# Patient Record
Sex: MALE | Race: WHITE | NOT HISPANIC OR LATINO | Employment: OTHER
[De-identification: names, ages, dates, MRNs, and addresses within clinical notes are randomized per-mention and may not be internally consistent; named-entity substitution may affect disease eponyms.]

---

## 2018-07-20 ENCOUNTER — HOSPITAL ENCOUNTER (OUTPATIENT)
Dept: HOME HEALTH SERVICES | Facility: HOME HEALTHCARE | Age: 68
Setting detail: SPECIMEN
Discharge: HOME | End: 2018-07-20
Attending: INTERNAL MEDICINE | Admitting: INTERNAL MEDICINE

## 2018-07-20 LAB — VANCOMYCIN TROUGH SERPL-MCNC: 18 UG/ML (ref 10–20)

## 2018-07-24 ENCOUNTER — HOSPITAL ENCOUNTER (OUTPATIENT)
Dept: HOME HEALTH SERVICES | Facility: HOME HEALTHCARE | Age: 68
Setting detail: SPECIMEN
Discharge: HOME | End: 2018-07-24
Attending: INTERNAL MEDICINE | Admitting: INTERNAL MEDICINE

## 2018-07-24 LAB
ANION GAP SERPL CALC-SCNC: 11.2 MMOL/L (ref 10–20)
BASOPHILS # BLD AUTO: 0.1 10*3/UL (ref 0–0.2)
BASOPHILS NFR BLD AUTO: 1 % (ref 0–2)
BUN SERPL-MCNC: 31 MG/DL (ref 8–20)
BUN/CREAT SERPL: 23.8 (ref 6.2–20.3)
CALCIUM SERPL-MCNC: 8.8 MG/DL (ref 8.9–10.3)
CHLORIDE SERPL-SCNC: 104 MMOL/L (ref 101–111)
CONV CO2: 26 MMOL/L (ref 22–32)
CREAT UR-MCNC: 1.3 MG/DL (ref 0.7–1.2)
DIFFERENTIAL METHOD BLD: (no result)
EOSINOPHIL # BLD AUTO: 0.9 10*3/UL (ref 0–0.3)
EOSINOPHIL # BLD AUTO: 11 % (ref 0–3)
ERYTHROCYTE [DISTWIDTH] IN BLOOD BY AUTOMATED COUNT: 15.2 % (ref 11.5–14.5)
ERYTHROCYTE [SEDIMENTATION RATE] IN BLOOD BY WESTERGREN METHOD: 110 MM/HR (ref 0–20)
GLUCOSE SERPL-MCNC: 307 MG/DL (ref 65–99)
HCT VFR BLD AUTO: 29.5 % (ref 40–54)
HGB BLD-MCNC: 9.8 G/DL (ref 14–18)
LYMPHOCYTES # BLD AUTO: 1.8 10*3/UL (ref 0.8–4.8)
LYMPHOCYTES NFR BLD AUTO: 21 % (ref 18–42)
MCH RBC QN AUTO: 29.5 PG (ref 26–32)
MCHC RBC AUTO-ENTMCNC: 33.1 G/DL (ref 32–36)
MCV RBC AUTO: 88.8 FL (ref 80–94)
MONOCYTES # BLD AUTO: 0.7 10*3/UL (ref 0.1–1.3)
MONOCYTES NFR BLD AUTO: 8 % (ref 2–11)
NEUTROPHILS # BLD AUTO: 5 10*3/UL (ref 2.3–8.6)
NEUTROPHILS NFR BLD AUTO: 59 % (ref 50–75)
NRBC BLD AUTO-RTO: 0 /100{WBCS}
NRBC/RBC NFR BLD MANUAL: 0 10*3/UL
PLATELET # BLD AUTO: 385 10*3/UL (ref 150–450)
PMV BLD AUTO: 8.3 FL (ref 7.4–10.4)
POTASSIUM SERPL-SCNC: 4.2 MMOL/L (ref 3.6–5.1)
RBC # BLD AUTO: 3.32 10*6/UL (ref 4.6–6)
SODIUM SERPL-SCNC: 137 MMOL/L (ref 136–144)
VANCOMYCIN TROUGH SERPL-MCNC: 21 UG/ML (ref 10–20)
WBC # BLD AUTO: 8.5 10*3/UL (ref 4.5–11.5)

## 2018-07-31 ENCOUNTER — HOSPITAL ENCOUNTER (OUTPATIENT)
Dept: HOME HEALTH SERVICES | Facility: HOME HEALTHCARE | Age: 68
Setting detail: SPECIMEN
Discharge: HOME | End: 2018-07-31
Attending: INTERNAL MEDICINE | Admitting: INTERNAL MEDICINE

## 2018-07-31 LAB
ANION GAP SERPL CALC-SCNC: 13.2 MMOL/L (ref 10–20)
BASOPHILS # BLD AUTO: 0.1 10*3/UL (ref 0–0.2)
BASOPHILS NFR BLD AUTO: 1 % (ref 0–2)
BUN SERPL-MCNC: 30 MG/DL (ref 8–20)
BUN/CREAT SERPL: 23.1 (ref 6.2–20.3)
CALCIUM SERPL-MCNC: 8.8 MG/DL (ref 8.9–10.3)
CHLORIDE SERPL-SCNC: 98 MMOL/L (ref 101–111)
CONV CO2: 32 MMOL/L (ref 22–32)
CREAT UR-MCNC: 1.3 MG/DL (ref 0.7–1.2)
CRP SERPL-MCNC: 0.96 MG/DL (ref 0–0.7)
DIFFERENTIAL METHOD BLD: (no result)
EOSINOPHIL # BLD AUTO: 1.1 10*3/UL (ref 0–0.3)
EOSINOPHIL # BLD AUTO: 12 % (ref 0–3)
ERYTHROCYTE [DISTWIDTH] IN BLOOD BY AUTOMATED COUNT: 15.3 % (ref 11.5–14.5)
ERYTHROCYTE [SEDIMENTATION RATE] IN BLOOD BY WESTERGREN METHOD: 113 MM/HR (ref 0–20)
GLUCOSE SERPL-MCNC: 108 MG/DL (ref 65–99)
HCT VFR BLD AUTO: 29.4 % (ref 40–54)
HGB BLD-MCNC: 9.9 G/DL (ref 14–18)
LYMPHOCYTES # BLD AUTO: 2.7 10*3/UL (ref 0.8–4.8)
LYMPHOCYTES NFR BLD AUTO: 28 % (ref 18–42)
MCH RBC QN AUTO: 29.7 PG (ref 26–32)
MCHC RBC AUTO-ENTMCNC: 33.6 G/DL (ref 32–36)
MCV RBC AUTO: 88.6 FL (ref 80–94)
MONOCYTES # BLD AUTO: 0.8 10*3/UL (ref 0.1–1.3)
MONOCYTES NFR BLD AUTO: 8 % (ref 2–11)
NEUTROPHILS # BLD AUTO: 4.9 10*3/UL (ref 2.3–8.6)
NEUTROPHILS NFR BLD AUTO: 51 % (ref 50–75)
NRBC BLD AUTO-RTO: 0 /100{WBCS}
NRBC/RBC NFR BLD MANUAL: 0 10*3/UL
PLATELET # BLD AUTO: 353 10*3/UL (ref 150–450)
PMV BLD AUTO: 7.9 FL (ref 7.4–10.4)
POTASSIUM SERPL-SCNC: 4.2 MMOL/L (ref 3.6–5.1)
RBC # BLD AUTO: 3.32 10*6/UL (ref 4.6–6)
SODIUM SERPL-SCNC: 139 MMOL/L (ref 136–144)
VANCOMYCIN TROUGH SERPL-MCNC: 19 UG/ML (ref 10–20)
WBC # BLD AUTO: 9.5 10*3/UL (ref 4.5–11.5)

## 2018-08-07 ENCOUNTER — HOSPITAL ENCOUNTER (OUTPATIENT)
Dept: HOME HEALTH SERVICES | Facility: HOME HEALTHCARE | Age: 68
Setting detail: SPECIMEN
Discharge: HOME | End: 2018-08-07
Attending: INTERNAL MEDICINE | Admitting: INTERNAL MEDICINE

## 2018-08-07 LAB
ANION GAP SERPL CALC-SCNC: 15 MMOL/L (ref 10–20)
BASOPHILS # BLD AUTO: 0.1 10*3/UL (ref 0–0.2)
BASOPHILS NFR BLD AUTO: 1 % (ref 0–2)
BUN SERPL-MCNC: 32 MG/DL (ref 8–20)
BUN/CREAT SERPL: 24.6 (ref 6.2–20.3)
CALCIUM SERPL-MCNC: 9.1 MG/DL (ref 8.9–10.3)
CHLORIDE SERPL-SCNC: 101 MMOL/L (ref 101–111)
CONV CO2: 28 MMOL/L (ref 22–32)
CREAT UR-MCNC: 1.3 MG/DL (ref 0.7–1.2)
CRP SERPL-MCNC: 0.88 MG/DL (ref 0–0.7)
DIFFERENTIAL METHOD BLD: (no result)
EOSINOPHIL # BLD AUTO: 1.3 10*3/UL (ref 0–0.3)
EOSINOPHIL # BLD AUTO: 14 % (ref 0–3)
ERYTHROCYTE [DISTWIDTH] IN BLOOD BY AUTOMATED COUNT: 15.4 % (ref 11.5–14.5)
ERYTHROCYTE [SEDIMENTATION RATE] IN BLOOD BY WESTERGREN METHOD: 93 MM/HR (ref 0–20)
GLUCOSE SERPL-MCNC: 208 MG/DL (ref 65–99)
HCT VFR BLD AUTO: 31 % (ref 40–54)
HGB BLD-MCNC: 10.4 G/DL (ref 14–18)
LYMPHOCYTES # BLD AUTO: 2 10*3/UL (ref 0.8–4.8)
LYMPHOCYTES NFR BLD AUTO: 22 % (ref 18–42)
MCH RBC QN AUTO: 29.8 PG (ref 26–32)
MCHC RBC AUTO-ENTMCNC: 33.5 G/DL (ref 32–36)
MCV RBC AUTO: 89.1 FL (ref 80–94)
MONOCYTES # BLD AUTO: 0.8 10*3/UL (ref 0.1–1.3)
MONOCYTES NFR BLD AUTO: 9 % (ref 2–11)
NEUTROPHILS # BLD AUTO: 4.8 10*3/UL (ref 2.3–8.6)
NEUTROPHILS NFR BLD AUTO: 54 % (ref 50–75)
NRBC BLD AUTO-RTO: 0 /100{WBCS}
NRBC/RBC NFR BLD MANUAL: 0 10*3/UL
PLATELET # BLD AUTO: 270 10*3/UL (ref 150–450)
PMV BLD AUTO: 8.3 FL (ref 7.4–10.4)
POTASSIUM SERPL-SCNC: 4 MMOL/L (ref 3.6–5.1)
RBC # BLD AUTO: 3.48 10*6/UL (ref 4.6–6)
SODIUM SERPL-SCNC: 140 MMOL/L (ref 136–144)
VANCOMYCIN TROUGH SERPL-MCNC: 18 UG/ML (ref 10–20)
WBC # BLD AUTO: 9.1 10*3/UL (ref 4.5–11.5)

## 2018-10-22 ENCOUNTER — HOSPITAL ENCOUNTER (OUTPATIENT)
Dept: ORTHOPEDIC SURGERY | Facility: CLINIC | Age: 68
Discharge: HOME | End: 2018-10-22
Attending: PODIATRIST | Admitting: PODIATRIST

## 2018-11-08 ENCOUNTER — HOSPITAL ENCOUNTER (OUTPATIENT)
Dept: ORTHOPEDIC SURGERY | Facility: CLINIC | Age: 68
Discharge: HOME | End: 2018-11-08
Attending: PODIATRIST | Admitting: PODIATRIST

## 2018-11-12 ENCOUNTER — HOSPITAL ENCOUNTER (OUTPATIENT)
Dept: PREADMISSION TESTING | Facility: HOSPITAL | Age: 68
Discharge: HOME | End: 2018-11-12
Attending: PODIATRIST | Admitting: PODIATRIST

## 2018-11-12 LAB
ANION GAP SERPL CALC-SCNC: 15.3 MMOL/L (ref 10–20)
BACTERIA SPEC AEROBE CULT: NORMAL
BASOPHILS # BLD AUTO: 0.1 10*3/UL (ref 0–0.2)
BASOPHILS NFR BLD AUTO: 1 % (ref 0–2)
BUN SERPL-MCNC: 56 MG/DL (ref 8–20)
BUN/CREAT SERPL: 26.7 (ref 6.2–20.3)
CALCIUM SERPL-MCNC: 8.6 MG/DL (ref 8.9–10.3)
CHLORIDE SERPL-SCNC: 101 MMOL/L (ref 101–111)
CONV CO2: 29 MMOL/L (ref 22–32)
CREAT UR-MCNC: 2.1 MG/DL (ref 0.7–1.2)
DIFFERENTIAL METHOD BLD: (no result)
EOSINOPHIL # BLD AUTO: 0.3 10*3/UL (ref 0–0.3)
EOSINOPHIL # BLD AUTO: 4 % (ref 0–3)
ERYTHROCYTE [DISTWIDTH] IN BLOOD BY AUTOMATED COUNT: 15.2 % (ref 11.5–14.5)
GLUCOSE SERPL-MCNC: 278 MG/DL (ref 65–99)
HCT VFR BLD AUTO: 31.9 % (ref 40–54)
HGB BLD-MCNC: 11.1 G/DL (ref 14–18)
LYMPHOCYTES # BLD AUTO: 2.7 10*3/UL (ref 0.8–4.8)
LYMPHOCYTES NFR BLD AUTO: 28 % (ref 18–42)
Lab: NORMAL
MCH RBC QN AUTO: 31.6 PG (ref 26–32)
MCHC RBC AUTO-ENTMCNC: 34.6 G/DL (ref 32–36)
MCV RBC AUTO: 91.1 FL (ref 80–94)
MICRO REPORT STATUS: NORMAL
MONOCYTES # BLD AUTO: 1 10*3/UL (ref 0.1–1.3)
MONOCYTES NFR BLD AUTO: 10 % (ref 2–11)
NEUTROPHILS # BLD AUTO: 5.7 10*3/UL (ref 2.3–8.6)
NEUTROPHILS NFR BLD AUTO: 57 % (ref 50–75)
NRBC BLD AUTO-RTO: 0 /100{WBCS}
NRBC/RBC NFR BLD MANUAL: 0 10*3/UL
PLATELET # BLD AUTO: 280 10*3/UL (ref 150–450)
PMV BLD AUTO: 8.3 FL (ref 7.4–10.4)
POTASSIUM SERPL-SCNC: 4.3 MMOL/L (ref 3.6–5.1)
RBC # BLD AUTO: 3.5 10*6/UL (ref 4.6–6)
SODIUM SERPL-SCNC: 141 MMOL/L (ref 136–144)
SPECIMEN SOURCE: NORMAL
WBC # BLD AUTO: 9.7 10*3/UL (ref 4.5–11.5)

## 2018-11-16 ENCOUNTER — HOSPITAL ENCOUNTER (OUTPATIENT)
Dept: PREOP | Facility: HOSPITAL | Age: 68
Setting detail: HOSPITAL OUTPATIENT SURGERY
Discharge: HOME | End: 2018-11-16
Attending: PODIATRIST | Admitting: PODIATRIST

## 2018-11-16 LAB
ANION GAP SERPL CALC-SCNC: 16.2 MMOL/L (ref 10–20)
BACTERIA SPEC AEROBE CULT: NORMAL
BUN SERPL-MCNC: 32 MG/DL (ref 8–20)
BUN/CREAT SERPL: 22.9 (ref 6.2–20.3)
CALCIUM SERPL-MCNC: 9.2 MG/DL (ref 8.9–10.3)
CHLORIDE SERPL-SCNC: 101 MMOL/L (ref 101–111)
CONV CO2: 24 MMOL/L (ref 22–32)
CREAT UR-MCNC: 1.4 MG/DL (ref 0.7–1.2)
GLUCOSE BLD-MCNC: 171 MG/DL (ref 70–105)
GLUCOSE BLD-MCNC: 224 MG/DL (ref 70–105)
GLUCOSE BLD-MCNC: 243 MG/DL (ref 70–105)
GLUCOSE BLD-MCNC: 259 MG/DL (ref 70–105)
GLUCOSE SERPL-MCNC: 275 MG/DL (ref 65–99)
GRAM STN SPEC: NORMAL
Lab: NORMAL
MICRO REPORT STATUS: NORMAL
POTASSIUM SERPL-SCNC: 4.2 MMOL/L (ref 3.6–5.1)
SODIUM SERPL-SCNC: 137 MMOL/L (ref 136–144)
SPECIMEN SOURCE: NORMAL

## 2019-01-29 ENCOUNTER — HOSPITAL ENCOUNTER (OUTPATIENT)
Dept: OTHER | Facility: HOSPITAL | Age: 69
Discharge: HOME | End: 2019-01-29
Attending: PODIATRIST | Admitting: PODIATRIST

## 2019-01-29 LAB
ANION GAP SERPL CALC-SCNC: 15.4 MMOL/L (ref 10–20)
BASOPHILS # BLD AUTO: 0.1 10*3/UL (ref 0–0.2)
BASOPHILS NFR BLD AUTO: 1 % (ref 0–2)
BUN SERPL-MCNC: 27 MG/DL (ref 8–20)
BUN/CREAT SERPL: 19.3 (ref 6.2–20.3)
CALCIUM SERPL-MCNC: 9.1 MG/DL (ref 8.9–10.3)
CHLORIDE SERPL-SCNC: 103 MMOL/L (ref 101–111)
CONV CO2: 24 MMOL/L (ref 22–32)
CREAT UR-MCNC: 1.4 MG/DL (ref 0.7–1.2)
DIFFERENTIAL METHOD BLD: (no result)
EOSINOPHIL # BLD AUTO: 0.4 10*3/UL (ref 0–0.3)
EOSINOPHIL # BLD AUTO: 4 % (ref 0–3)
ERYTHROCYTE [DISTWIDTH] IN BLOOD BY AUTOMATED COUNT: 15.5 % (ref 11.5–14.5)
GLUCOSE SERPL-MCNC: 227 MG/DL (ref 65–99)
HCT VFR BLD AUTO: 34 % (ref 40–54)
HGB BLD-MCNC: 11 G/DL (ref 14–18)
LYMPHOCYTES # BLD AUTO: 2.2 10*3/UL (ref 0.8–4.8)
LYMPHOCYTES NFR BLD AUTO: 21 % (ref 18–42)
MCH RBC QN AUTO: 30.2 PG (ref 26–32)
MCHC RBC AUTO-ENTMCNC: 32.2 G/DL (ref 32–36)
MCV RBC AUTO: 93.6 FL (ref 80–94)
MONOCYTES # BLD AUTO: 0.8 10*3/UL (ref 0.1–1.3)
MONOCYTES NFR BLD AUTO: 8 % (ref 2–11)
NEUTROPHILS # BLD AUTO: 7 10*3/UL (ref 2.3–8.6)
NEUTROPHILS NFR BLD AUTO: 66 % (ref 50–75)
NRBC BLD AUTO-RTO: 0 /100{WBCS}
NRBC/RBC NFR BLD MANUAL: 0 10*3/UL
PLATELET # BLD AUTO: 319 10*3/UL (ref 150–450)
PMV BLD AUTO: 8.2 FL (ref 7.4–10.4)
POTASSIUM SERPL-SCNC: 4.4 MMOL/L (ref 3.6–5.1)
RBC # BLD AUTO: 3.63 10*6/UL (ref 4.6–6)
SODIUM SERPL-SCNC: 138 MMOL/L (ref 136–144)
WBC # BLD AUTO: 10.6 10*3/UL (ref 4.5–11.5)

## 2019-02-04 ENCOUNTER — HOSPITAL ENCOUNTER (OUTPATIENT)
Dept: PREOP | Facility: HOSPITAL | Age: 69
Setting detail: HOSPITAL OUTPATIENT SURGERY
Discharge: HOME | End: 2019-02-04
Attending: PODIATRIST | Admitting: PODIATRIST

## 2019-02-04 LAB
GLUCOSE BLD-MCNC: 150 MG/DL (ref 70–105)
GLUCOSE BLD-MCNC: 179 MG/DL (ref 70–105)
GLUCOSE BLD-MCNC: 187 MG/DL (ref 70–105)

## 2019-05-22 ENCOUNTER — HOSPITAL ENCOUNTER (OUTPATIENT)
Dept: RESPIRATORY THERAPY | Facility: HOSPITAL | Age: 69
Discharge: HOME | End: 2019-05-22
Attending: INTERNAL MEDICINE | Admitting: INTERNAL MEDICINE

## 2019-06-03 ENCOUNTER — TRANSCRIBE ORDERS (OUTPATIENT)
Dept: SLEEP MEDICINE | Facility: HOSPITAL | Age: 69
End: 2019-06-03

## 2019-06-03 DIAGNOSIS — G47.33 OBSTRUCTIVE SLEEP APNEA (ADULT) (PEDIATRIC): Primary | ICD-10-CM

## 2019-06-05 ENCOUNTER — CONVERSION ENCOUNTER (OUTPATIENT)
Dept: ORTHOPEDIC SURGERY | Facility: CLINIC | Age: 69
End: 2019-06-05

## 2019-06-05 VITALS
BODY MASS INDEX: 40.8 KG/M2 | DIASTOLIC BLOOD PRESSURE: 63 MMHG | SYSTOLIC BLOOD PRESSURE: 114 MMHG | WEIGHT: 285 LBS | HEART RATE: 69 BPM | HEIGHT: 70 IN

## 2019-06-06 NOTE — PROGRESS NOTES
Visit Type:  Acute Visit  Primary Provider:  Delmar    CC:  Left foot ulcer F/U.    History of Present Illness:  Patient returns for follow-up on left foot ulceration.  He states that he is doing well.  He has been doing dressings as directed.  Denies any other issues.      Past Medical History:     Reviewed history from 11/05/2015 and no changes required:        Diabetes Mellitus        Hyperlipidemia        Obesity        Hypogonadism male        Angina        Arthritis        Unsteady gait        Diabetic Neuropathy        Loss of appetite        Sleep Apnea        Carpal Tunnel on left hand    Past Surgical History:     Reviewed history from 02/20/2019 and no changes required:        CABG 2005 @ [facility]        Angioplasty x2        PTCA        Colon Resection 2005        Appendectomy        Cholecystectomy        Bilateral Cataract Removal 2002- by Dr. Lux Acosta        Right toe removed D/T infected cut that went to the bone.        Heart Catherization (11/13/2015)        P T C A: (11/13/2015) stent to proximal in stent and mid to distal lad         cyst removed from scrotum         P T C A: (09/16/2016)stent to mid lad and stent to vein graft to the marginal         Carpal Tunnel - Lt hand // other hand surgeries (4/29/2018)        Right foot surgery 7/17/18        Left foot 2/4/19    Active Medications (reviewed today):  ATORVASTATIN CALCIUM 40 MG ORAL TABLET (ATORVASTATIN CALCIUM) Take 1 tablet by mouth daily  ASPIRIN 325 MG ORAL TABLET (ASPIRIN) Take 1 tablet by mouth daily  DIGITEK 250 MCG ORAL TABLET (DIGOXIN) Take 1 tablet by mouth daily  PLAVIX 75 MG ORAL TABLET (CLOPIDOGREL BISULFATE) Take 1 tablet by mouth daily  BUMETANIDE 2 MG ORAL TABLET (BUMETANIDE) Take 1 tablet by mouth daily  GABAPENTIN 300 MG ORAL CAPSULE (GABAPENTIN) Take two (1) tablet by mouth three times a day  ENALAPRIL MALEATE 2.5 MG ORAL TABLET (ENALAPRIL MALEATE) Take one (1) tablet by mouth twice a day  ATENOLOL 100 MG ORAL TABLET  (ATENOLOL) Take 1 tablet by mouth daily  ISOSORBIDE MONONITRATE ER 60 MG ORAL TABLET EXTENDED RELEASE 24 HOUR (ISOSORBIDE MONONITRATE) Take one (1) tablet by mouth twice a day  METFORMIN HCL  MG ORAL TABLET EXTENDED RELEASE 24 HOUR (METFORMIN HCL) Take two (2) tablets by mouth twice a day  BENADRYL 25 MG ORAL TABLET (DIPHENHYDRAMINE HCL) PRN  BASE A POLYETHYLENE GLYCOL POWDER (POLYETHYLENE GLYCOL 1450)   NORCO 5-325 MG ORAL TABLET (HYDROCODONE-ACETAMINOPHEN) PRN for pain  NITROSTAT 0.4 MG SUBLINGUAL TABLET SUBLINGUAL (NITROGLYCERIN) as needed  NOVOLIN 70/30 (70-30) 100 UNIT/ML SUBCUTANEOUS SUSPENSION (INSULIN NPH ISOPHANE & REGULAR) 45 Units twice daily    Current Allergies (reviewed today):  CODEINE SULFATE (Critical)    Current Medications (including medications started today):   ALBUTEROL SULFATE  (90 BASE) MCG/ACT INHALATION AEROSOL SOLUTION (ALBUTEROL SULFATE) as directed  ATORVASTATIN CALCIUM 40 MG ORAL TABLET (ATORVASTATIN CALCIUM) Take 1 tablet by mouth daily  ASPIRIN 325 MG ORAL TABLET (ASPIRIN) Take 1 tablet by mouth daily  DIGITEK 250 MCG ORAL TABLET (DIGOXIN) Take 1 tablet by mouth daily  PLAVIX 75 MG ORAL TABLET (CLOPIDOGREL BISULFATE) Take 1 tablet by mouth daily  BUMETANIDE 2 MG ORAL TABLET (BUMETANIDE) Take 1 tablet by mouth daily  GABAPENTIN 300 MG ORAL CAPSULE (GABAPENTIN) Take two (1) tablet by mouth three times a day  ENALAPRIL MALEATE 2.5 MG ORAL TABLET (ENALAPRIL MALEATE) Take one (1) tablet by mouth twice a day  ATENOLOL 100 MG ORAL TABLET (ATENOLOL) Take 1 tablet by mouth daily  ISOSORBIDE MONONITRATE ER 60 MG ORAL TABLET EXTENDED RELEASE 24 HOUR (ISOSORBIDE MONONITRATE) Take one (1) tablet by mouth twice a day  METFORMIN HCL  MG ORAL TABLET EXTENDED RELEASE 24 HOUR (METFORMIN HCL) Take two (2) tablets by mouth twice a day  BENADRYL 25 MG ORAL TABLET (DIPHENHYDRAMINE HCL) PRN  BASE A POLYETHYLENE GLYCOL POWDER (POLYETHYLENE GLYCOL 1450)   NORCO 5-325 MG ORAL TABLET  (HYDROCODONE-ACETAMINOPHEN) PRN for pain  NITROSTAT 0.4 MG SUBLINGUAL TABLET SUBLINGUAL (NITROGLYCERIN) as needed  NOVOLIN 70/30 (70-30) 100 UNIT/ML SUBCUTANEOUS SUSPENSION (INSULIN NPH ISOPHANE & REGULAR) 45 Units twice daily          Vital Signs:    Patient Profile:    68 Years Old Male  Height:     70 inches  Weight:     285 pounds  BMI:        40.89     Pulse rate: 69 / minute  BP Sittin / 63  (left arm)    Cuff size:  large      Problems: Active problems were reviewed with the patient during this visit.  Medications: Medications were reviewed with the patient during this visit.  Allergies: Allergies were reviewed with the patient during this visit.        Vitals Entered By: Grace Gonzales CMA (2019 10:12 AM)    Family History Summary:      Reviewed history Last on 05/15/2019 and no changes required:2019  Mother - Has Family History of Hypertension - Entered On: 2015  Sister - Has Family History of Hypertension - Entered On: 2015  Brother - Has Family History of Heart Disease - Entered On: 2015  Sister - Has Family History of Heart Disease - Entered On: 2015  Mother - Has Family History of Heart Disease - Entered On: 2015  Father - Has Family History of Heart Disease - Entered On: 2015  Sister - Has Family History of Diabetes - Entered On: 2015  Brother - Has Family History of Diabetes - Entered On: 2015  Mother - Has Family History of Diabetes - Entered On: 2015      Social History:     Reviewed history from 2018 and no changes required:        Patient currently smokes every day.        Patient has been counseled to quit.        Alcohol Use: N        Drug Use: Y        HIV/High Risk: N        Regular Exercise: N                Risk Factors:     Smoked Tobacco Use:  Current every day smoker     Cigarettes:  Yes -- 1ppd pack(s) per day,      Year started:    Smokeless Tobacco Use:  Never     Counseled to quit/cut down:  yes  Drug use:   yes     Substance:  marijuana  HIV high-risk behavior:  no  Caffeine use:  2 drinks per day  Alcohol use:  no  Exercise:  no  Seatbelt use:  100 %  Sun Exposure:  occasionally    Family History Risk Factors:     Family History of MI in females < 65 years old:  yes     Family History of MI in males < 55 years old:  yes      Podiatry Exam       General Appearance:  well nourished; well hydrated; no acute distress  Mental Status Exam        Judgement and Insight:  Intact       Orientation:  Oriented to time, place, and person  Cardiovascular (Left)       Dorsalis Pedis Pulse (Lt):   2/4       Posterior Tibialis Pulse (Lt):   2/4       Capillary Filling Time (Lt):  1-3 Seconds       Edema (Lt):    Moderate +pitting edema  Dermatological Exam       Temperature:  warm to warm       Skin Elasticity:  decreased elasticity  Skin:   Ulceration appears to be healed at this time  With immature  epithelialization.  No signs of infection, drainage, or maceration.  Neurological Exam (Left)       Paresthesia (Lt):  negative       Patella DTRs (Lt):  symmetric       Achilles DTRs (Lt):  symmetric  Monofilament Test (Lt):   not intact       Diabetic Risk (Lt):  Loss of protective sensation with no weakness deformity callus or pre-ulcer      MusculoSkeletal Exam (Left)       Gait and Stance (Lt):   boot assisted       ROM (Lt):  no crepitus or pain       Muscle Strength (Lt):  symmetrical 5/5       Subluxed Digits (Lt):  no subluxation or laxity of joints       Dislocated Joints (Lt):  no dislocation of joints       Gastroc soleus equinus (Lt):  Inability to dorsiflex past neutral position both straight legged and bent knee       Post tibial tendon (Lt):  no soreness noted       Peroneal Tendon (Lt):  no soreness noted        Impression & Recommendations:    Problem # 1:  Chronic ulcer of left foot, fat layer exposed (ICD-707.15) (KNU30-E30.522)    Patient returns for evaluation of the left foot ulceration.  The wound appears to be  healed at this time.  I explained that the soft tissue is immature and needs to be protected.  I have asked that he continue the same off weight-bearing protocol.  He is   to remain in the postop shoe.  He may continue padding the area but does not need a formal dressing.  He is to call with any additional changes.  I will see him in 3 weeks for re-evaluation.    Medications Added to Medication List This Visit:  1)  Albuterol Sulfate Hfa 108 (90 Base) Mcg/act Inhalation Aerosol Solution (Albuterol sulfate) .... As directed                  Medication Administration    Orders Added:  1)  39768-Lag Vst-Est Level III [CPT-45359]    ]      Electronically signed by Maximino Chung on 06/05/2019 at 10:29 AM  ________________________________________________________________________       Disclaimer: Converted Note message may not contain all data elements that existed in the legacy source system. Please see [employer] for the original note details.

## 2019-06-21 ENCOUNTER — TRANSCRIBE ORDERS (OUTPATIENT)
Dept: SLEEP MEDICINE | Facility: HOSPITAL | Age: 69
End: 2019-06-21

## 2019-06-22 ENCOUNTER — HOSPITAL ENCOUNTER (OUTPATIENT)
Dept: SLEEP MEDICINE | Facility: HOSPITAL | Age: 69
Discharge: HOME | End: 2019-06-22
Admitting: INTERNAL MEDICINE

## 2019-06-22 VITALS — HEIGHT: 70 IN | BODY MASS INDEX: 41.09 KG/M2 | WEIGHT: 287 LBS

## 2019-06-22 DIAGNOSIS — G47.33 OBSTRUCTIVE SLEEP APNEA (ADULT) (PEDIATRIC): ICD-10-CM

## 2019-06-22 PROCEDURE — 95811 POLYSOM 6/>YRS CPAP 4/> PARM: CPT

## 2019-06-22 RX ORDER — ASPIRIN 81 MG/1
81 TABLET, CHEWABLE ORAL DAILY
Status: ON HOLD | COMMUNITY
End: 2022-01-01

## 2019-06-22 RX ORDER — DOXYCYCLINE HYCLATE 100 MG/1
100 CAPSULE ORAL 2 TIMES DAILY
COMMUNITY
End: 2020-08-23

## 2019-06-22 RX ORDER — ATENOLOL 100 MG/1
100 TABLET ORAL DAILY
COMMUNITY
End: 2021-08-02 | Stop reason: HOSPADM

## 2019-06-22 RX ORDER — GABAPENTIN 600 MG/1
600 TABLET ORAL 3 TIMES DAILY
COMMUNITY
End: 2020-12-01 | Stop reason: HOSPADM

## 2019-06-22 RX ORDER — BUMETANIDE 1 MG/1
1 TABLET ORAL EVERY MORNING
COMMUNITY
End: 2021-06-03 | Stop reason: HOSPADM

## 2019-06-22 RX ORDER — ATORVASTATIN CALCIUM 40 MG/1
40 TABLET, FILM COATED ORAL NIGHTLY
COMMUNITY

## 2019-06-22 RX ORDER — ENALAPRIL MALEATE 5 MG/1
5 TABLET ORAL 2 TIMES DAILY
COMMUNITY
End: 2019-06-26

## 2019-06-22 RX ORDER — HYDROCODONE BITARTRATE AND ACETAMINOPHEN 5; 325 MG/1; MG/1
1 TABLET ORAL EVERY 12 HOURS PRN
COMMUNITY
End: 2020-11-16

## 2019-06-22 RX ORDER — CLOPIDOGREL BISULFATE 75 MG/1
75 TABLET ORAL DAILY
COMMUNITY
End: 2021-05-06

## 2019-06-22 RX ORDER — ISOSORBIDE MONONITRATE 60 MG/1
60 TABLET, EXTENDED RELEASE ORAL DAILY
Status: ON HOLD | COMMUNITY
End: 2022-01-01

## 2019-06-22 RX ORDER — DIGOXIN 125 MCG
125 TABLET ORAL
COMMUNITY
End: 2021-08-02 | Stop reason: HOSPADM

## 2019-06-26 ENCOUNTER — OFFICE VISIT (OUTPATIENT)
Dept: PODIATRY | Facility: CLINIC | Age: 69
End: 2019-06-26

## 2019-06-26 VITALS
BODY MASS INDEX: 40.89 KG/M2 | DIASTOLIC BLOOD PRESSURE: 74 MMHG | HEIGHT: 70 IN | WEIGHT: 285.6 LBS | HEART RATE: 80 BPM | SYSTOLIC BLOOD PRESSURE: 160 MMHG

## 2019-06-26 DIAGNOSIS — L97.522 CHRONIC ULCER OF LEFT FOOT WITH FAT LAYER EXPOSED (HCC): Primary | ICD-10-CM

## 2019-06-26 DIAGNOSIS — E11.42 DM TYPE 2 WITH DIABETIC PERIPHERAL NEUROPATHY (HCC): ICD-10-CM

## 2019-06-26 PROCEDURE — 99213 OFFICE O/P EST LOW 20 MIN: CPT | Performed by: PODIATRIST

## 2019-06-26 RX ORDER — ENALAPRIL MALEATE 5 MG/1
5 TABLET ORAL DAILY
COMMUNITY
Start: 2019-06-04 | End: 2021-05-06

## 2019-06-26 NOTE — PROGRESS NOTES
06/26/2019  Foot and Ankle Surgery - Established Patient/Follow-up  Provider: Dr. Maximino Chung DPM  Location: [facility]    Subjective:  Benson Mclean is a 68 y.o. male.     Chief Complaint   Patient presents with   • Left Foot - Wound Check, Follow-up       HPI: Patient returns for follow-up on his left foot wound.  He states that he is doing quite well.  The wound is well-healed at this time.  He has transition to a regular shoe.  He states that he is awaiting fitting for his diabetic shoes and inserts.  No other issues today.    Allergies   Allergen Reactions   • Codeine Itching       Current Outpatient Medications on File Prior to Visit   Medication Sig Dispense Refill   • aspirin 325 MG tablet Take 325 mg by mouth Daily.     • atenolol (TENORMIN) 100 MG tablet Take 100 mg by mouth Daily.     • atorvastatin (LIPITOR) 40 MG tablet Take 40 mg by mouth Daily.     • bumetanide (BUMEX) 2 MG tablet Take 2 mg by mouth 2 (Two) Times a Day.     • clopidogrel (PLAVIX) 75 MG tablet Take 75 mg by mouth Daily.     • digoxin (LANOXIN) 125 MCG tablet Take 125 mcg by mouth Daily.     • enalapril (VASOTEC) 5 MG tablet TAKE 1 TABLET TWICE DAILY     • gabapentin (NEURONTIN) 600 MG tablet Take 600 mg by mouth 2 (Two) Times a Day.     • HYDROcodone-acetaminophen (NORCO) 5-325 MG per tablet Take 1 tablet by mouth Every 12 (Twelve) Hours As Needed.     • insulin NPH-insulin regular (humuLIN 70/30,novoLIN 70/30) (70-30) 100 UNIT/ML injection Inject 40 Units under the skin into the appropriate area as directed 2 (Two) Times a Day With Meals.     • isosorbide mononitrate (IMDUR) 60 MG 24 hr tablet Take 60 mg by mouth 2 (Two) Times a Day.     • metFORMIN (GLUCOPHAGE) 500 MG tablet Take 500 mg by mouth 2 (Two) Times a Day With Meals.     • doxycycline (VIBRAMYCIN) 100 MG capsule Take 100 mg by mouth 2 (Two) Times a Day.     • [DISCONTINUED] enalapril (VASOTEC) 5 MG tablet Take 5 mg by mouth 2 (Two) Times a Day.       No  "current facility-administered medications on file prior to visit.        Objective   /74 (BP Location: Left arm, Patient Position: Sitting, Cuff Size: Large Adult)   Pulse 80   Ht 177.8 cm (70\")   Wt 130 kg (285 lb 9.6 oz)   BMI 40.98 kg/m²     Podiatry Exam       General Appearance:  well nourished; well hydrated; no acute distress  Mental Status Exam        Judgement and Insight:  Intact       Orientation:  Oriented to time, place, and person  Cardiovascular (Left)       Dorsalis Pedis Pulse (Lt):   2/4       Posterior Tibialis Pulse (Lt):   2/4       Capillary Filling Time (Lt):  1-3 Seconds       Edema (Lt):    Moderate +pitting edema  Dermatological Exam       Temperature:  warm to warm       Skin Elasticity:  decreased elasticity  Skin: Wound is well-healed at this time.  Stable overlying hyperkeratotic tissue.  Neurological Exam (Left)       Paresthesia (Lt):  negative       Patella DTRs (Lt):  symmetric       Achilles DTRs (Lt):  symmetric  Monofilament Test (Lt):   not intact       Diabetic Risk (Lt):  Loss of protective sensation with no weakness deformity callus or pre-ulcer        MusculoSkeletal Exam (Left)       Gait and Stance (Lt):   boot assisted       ROM (Lt):  no crepitus or pain       Muscle Strength (Lt):  symmetrical 5/5       Subluxed Digits (Lt):  no subluxation or laxity of joints       Dislocated Joints (Lt):  no dislocation of joints       Gastroc soleus equinus (Lt):  Inability to dorsiflex past neutral position both straight legged and bent knee       Post tibial tendon (Lt):  no soreness noted       Peroneal Tendon (Lt):  no soreness noted       Assessment/Plan     Benson was seen today for wound check and follow-up.    Diagnoses and all orders for this visit:    Chronic ulcer of left foot with fat layer exposed (CMS/HCC)    DM type 2 with diabetic peripheral neuropathy (CMS/HCC)    Wound is now healed.  I have asked that he gradually progress to baseline activity.  He is to " follow-up about his diabetic shoes and inserts.Explained importance of diabetic foot care, daily foot checks, and glycemic control. Patient should check both feet on a daily basis, monitor and control blood sugars, make sure that both feet and in between toes are towel dried after baths or showers. Avoid barefoot walking at all times. Check shoes before putting them on.   Patient was given information on proper foot care. Call the office at the first signs of a wound or with signs of infection.  I would like to see him in 3 months for routine diabetic foot check    No orders of the defined types were placed in this encounter.           Note is dictated utilizing voice recognition software. Unfortunately this leads to occasional typographical errors. I apologize in advance if the situation occurs. If questions occur please do not hesitate to call our office.

## 2019-07-07 DIAGNOSIS — G47.33 OSA (OBSTRUCTIVE SLEEP APNEA): Primary | ICD-10-CM

## 2019-07-26 ENCOUNTER — APPOINTMENT (OUTPATIENT)
Dept: GENERAL RADIOLOGY | Facility: HOSPITAL | Age: 69
End: 2019-07-26

## 2019-07-26 ENCOUNTER — HOSPITAL ENCOUNTER (EMERGENCY)
Facility: HOSPITAL | Age: 69
Discharge: HOME | End: 2019-07-26
Admitting: EMERGENCY MEDICINE

## 2019-07-26 VITALS
TEMPERATURE: 98.7 F | HEIGHT: 70 IN | BODY MASS INDEX: 39.89 KG/M2 | RESPIRATION RATE: 18 BRPM | DIASTOLIC BLOOD PRESSURE: 69 MMHG | WEIGHT: 278.66 LBS | SYSTOLIC BLOOD PRESSURE: 153 MMHG | HEART RATE: 67 BPM | OXYGEN SATURATION: 95 %

## 2019-07-26 DIAGNOSIS — S80.02XA CONTUSION OF LEFT KNEE, INITIAL ENCOUNTER: ICD-10-CM
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
## 2019-07-26 DIAGNOSIS — S83.91XA SPRAIN OF RIGHT KNEE, UNSPECIFIED LIGAMENT, INITIAL ENCOUNTER: ICD-10-CM

## 2019-07-26 DIAGNOSIS — S40.011A CONTUSION OF MULTIPLE SITES OF RIGHT SHOULDER, INITIAL ENCOUNTER: ICD-10-CM

## 2019-07-26 PROCEDURE — 73562 X-RAY EXAM OF KNEE 3: CPT

## 2019-07-26 PROCEDURE — 73030 X-RAY EXAM OF SHOULDER: CPT

## 2019-07-26 PROCEDURE — 73590 X-RAY EXAM OF LOWER LEG: CPT

## 2019-07-26 PROCEDURE — 99283 EMERGENCY DEPT VISIT LOW MDM: CPT

## 2019-07-26 RX ORDER — MULTIPLE VITAMINS W/ MINERALS TAB 9MG-400MCG
1 TAB ORAL DAILY
COMMUNITY
End: 2020-11-16

## 2019-08-07 ENCOUNTER — OFFICE VISIT (OUTPATIENT)
Dept: ORTHOPEDIC SURGERY | Facility: CLINIC | Age: 69
End: 2019-08-07

## 2019-08-07 VITALS
HEIGHT: 70 IN | BODY MASS INDEX: 40.66 KG/M2 | DIASTOLIC BLOOD PRESSURE: 66 MMHG | HEART RATE: 72 BPM | WEIGHT: 284 LBS | SYSTOLIC BLOOD PRESSURE: 125 MMHG

## 2019-08-07 DIAGNOSIS — M25.561 ACUTE PAIN OF RIGHT KNEE: Primary | ICD-10-CM

## 2019-08-07 PROCEDURE — 99203 OFFICE O/P NEW LOW 30 MIN: CPT | Performed by: ORTHOPAEDIC SURGERY

## 2020-01-23 ENCOUNTER — OFFICE VISIT (OUTPATIENT)
Dept: CARDIOLOGY | Facility: CLINIC | Age: 70
End: 2020-01-23

## 2020-01-23 VITALS
OXYGEN SATURATION: 96 % | WEIGHT: 284.5 LBS | HEART RATE: 65 BPM | SYSTOLIC BLOOD PRESSURE: 160 MMHG | BODY MASS INDEX: 40.73 KG/M2 | HEIGHT: 70 IN | DIASTOLIC BLOOD PRESSURE: 80 MMHG

## 2020-01-23 DIAGNOSIS — Z95.1 HX OF CABG: Primary | ICD-10-CM

## 2020-01-23 DIAGNOSIS — E78.5 DYSLIPIDEMIA: ICD-10-CM

## 2020-01-23 DIAGNOSIS — E08.9 DIABETES MELLITUS DUE TO UNDERLYING CONDITION WITHOUT COMPLICATION, WITHOUT LONG-TERM CURRENT USE OF INSULIN (HCC): ICD-10-CM

## 2020-01-23 DIAGNOSIS — I10 ESSENTIAL HYPERTENSION: ICD-10-CM

## 2020-01-23 PROCEDURE — 99213 OFFICE O/P EST LOW 20 MIN: CPT | Performed by: INTERNAL MEDICINE

## 2020-01-23 PROCEDURE — 93000 ELECTROCARDIOGRAM COMPLETE: CPT | Performed by: INTERNAL MEDICINE

## 2020-01-23 RX ORDER — DULOXETINE 40 MG/1
40 CAPSULE, DELAYED RELEASE ORAL DAILY
COMMUNITY
Start: 2019-11-26 | End: 2020-11-16

## 2020-01-23 RX ORDER — TIOTROPIUM BROMIDE 18 UG/1
CAPSULE ORAL; RESPIRATORY (INHALATION)
COMMUNITY
Start: 2019-12-02 | End: 2020-06-22 | Stop reason: SDUPTHER

## 2020-01-23 RX ORDER — SULFAMETHOXAZOLE AND TRIMETHOPRIM 800; 160 MG/1; MG/1
TABLET ORAL
COMMUNITY
Start: 2020-01-15 | End: 2020-08-23

## 2020-01-23 NOTE — PROGRESS NOTES
Encounter Date:01/23/2020  Last seen November 2018      Patient ID: Benson Mclean is a 69 y.o. male.    Chief Complaint:  Status post CABG  Hypertension  Diabetes  Renal dysfunction  History of intermittent atrial fibrillation      History of Present Illness  Patient has occasional sharp left-sided discomfort nonexertional nonradiating.  No other associated aggravating or alleviating factors.    Since I have last seen, the patient has been without any unusual shortness of breath, palpitations, dizziness or syncope.  Denies having any headache ,abdominal pain ,nausea, vomiting , diarrhea constipation, loss of weight or loss of appetite.  Denies having any excessive bruising ,hematuria or blood in the stool.    Review of all systems negative except as indicated    Assessment and Plan       [[[[[[[[[[[[[[[[[[[[[[[[[    Impression  ==============      -status post CABG 2005. status post stent to LAD 2009    Status post stent to LAD 11/13/2015  Status post stent to mid LAD and SVG to marginal branch 09/16/2016.     Cardiac catheterization 09/16/2016 revealed  Left ventricular inferior wall hypokinesis with ejection fraction of 40%.  Left main coronary artery is normal  Proximal LAD stent is patent.  95-99% mid LAD InStent restenoses  RCA and circumflex coronary arteries are chronically occluded.  Sherman is not a graft.  Lima is normal  Left subclavian artery is normal  SVG to marginal branch has proximal 90% disease.  It is a jump graft to 1st and 2nd marginal branches.  First marginal branch has been chronically occluded.  SVG to RCA is patent       -status post myocardial infarction 2000 and 2002 .     -compensated congestive heart failure     -history of intermittent atrial fibrillation-maintaining sinus rhythm      -diabetes hypertension and sleep apnea in history of renal dysfunction .  Recent BUN 32 creatinine 1.8     -status post colon surgery (partial colectomy) appendectomy cholecystectomy right 4th toe  removal and carpal tunnel surgery      -continued smoking 1 pack per day -abstinence from smoking      -allergy to codeine.  ============   Plan  ================  EKG showed sinus rhythm first-degree AV block nonspecific ST-T changes  Patient is not having any angina pectoris or congestive heart failure.  Patient has occasional sharp chest pains  Patient had last stent placement in September 2016.  Medications were reviewed and updated.  Followup in the office in  6 months   [[[[[[[[[[[[[[[[[[[[[[[[[[[[[            Diagnosis Plan   1. Hx of CABG     2. Essential hypertension     3. Dyslipidemia     4. Diabetes mellitus due to underlying condition without complication, without long-term current use of insulin (CMS/[facility])     LAB RESULTS (LAST 7 DAYS)    CBC        BMP        CMP         BNP        TROPONIN        CoAg        Creatinine Clearance  Estimated Creatinine Clearance: 58.8 mL/min (A) (by C-G formula based on SCr of 1.6 mg/dL (H)).    ABG        Radiology  No radiology results for the last day                The following portions of the patient's history were reviewed and updated as appropriate: allergies, current medications, past family history, past medical history, past social history, past surgical history and problem list.    Review of Systems   Constitution: Negative for malaise/fatigue.   Cardiovascular: Positive for chest pain and leg swelling (feet, especially in left). Negative for palpitations and syncope.   Respiratory: Positive for shortness of breath (always).    Skin: Negative for rash.   Gastrointestinal: Negative for nausea and vomiting.   Neurological: Positive for light-headedness (spells). Negative for dizziness and numbness.         Current Outpatient Medications:   •  aspirin 325 MG tablet, Take 325 mg by mouth Daily., Disp: , Rfl:   •  atenolol (TENORMIN) 100 MG tablet, Take 100 mg by mouth Daily., Disp: , Rfl:   •  atorvastatin (LIPITOR) 40 MG tablet, Take 40 mg by mouth Daily.,  Disp: , Rfl:   •  bumetanide (BUMEX) 2 MG tablet, Take 2 mg by mouth 2 (Two) Times a Day., Disp: , Rfl:   •  clopidogrel (PLAVIX) 75 MG tablet, Take 75 mg by mouth Daily., Disp: , Rfl:   •  digoxin (LANOXIN) 125 MCG tablet, Take 125 mcg by mouth Daily., Disp: , Rfl:   •  doxycycline (VIBRAMYCIN) 100 MG capsule, Take 100 mg by mouth 2 (Two) Times a Day., Disp: , Rfl:   •  DULoxetine HCl 40 MG capsule delayed-release particles, , Disp: , Rfl:   •  enalapril (VASOTEC) 5 MG tablet, TAKE 1 TABLET TWICE DAILY, Disp: , Rfl:   •  gabapentin (NEURONTIN) 600 MG tablet, Take 600 mg by mouth 2 (Two) Times a Day., Disp: , Rfl:   •  HYDROcodone-acetaminophen (NORCO) 5-325 MG per tablet, Take 1 tablet by mouth Every 12 (Twelve) Hours As Needed., Disp: , Rfl:   •  insulin NPH-insulin regular (humuLIN 70/30,novoLIN 70/30) (70-30) 100 UNIT/ML injection, Inject 40 Units under the skin into the appropriate area as directed 2 (Two) Times a Day With Meals., Disp: , Rfl:   •  isosorbide mononitrate (IMDUR) 60 MG 24 hr tablet, Take 60 mg by mouth 2 (Two) Times a Day., Disp: , Rfl:   •  metFORMIN (GLUCOPHAGE) 500 MG tablet, Take 500 mg by mouth 2 (Two) Times a Day With Meals., Disp: , Rfl:   •  Multiple Vitamins-Minerals (MULTIVITAMIN WITH MINERALS) tablet tablet, Take 1 tablet by mouth Daily., Disp: , Rfl:   •  SPIRIVA HANDIHALER 18 MCG per inhalation capsule, INL CONTENTS OF 1 C ONCE DAILY USING HANDIHALER, Disp: , Rfl:   •  sulfamethoxazole-trimethoprim (BACTRIM DS,SEPTRA DS) 800-160 MG per tablet, TK 1 T PO BID. FINISH ALL OF THIS MEDICATION, Disp: , Rfl:     Allergies   Allergen Reactions   • Codeine Itching       History reviewed. No pertinent family history.    Past Surgical History:   Procedure Laterality Date   • ANGIOPLASTY      X2   • APPENDECTOMY     • CARDIAC CATHETERIZATION  11/13/2015   • CARPAL TUNNEL RELEASE Left 04/29/2018    carpal tunnel- lt hand// other hand surgeries    • CATARACT EXTRACTION, BILATERAL  2002    Dr. Wasserman  [place]   • CHOLECYSTECTOMY     • COLON RESECTION  2005   • CORONARY ANGIOPLASTY     • CORONARY ANGIOPLASTY WITH STENT PLACEMENT  11/13/2015    PTCA stent to proximal in stent and mid to distal lad   • CORONARY ANGIOPLASTY WITH STENT PLACEMENT  09/16/2016    PTCA stent to mid lad and stent to vein graft to marginal   • CORONARY ARTERY BYPASS GRAFT  2005    @ [facility]   • CYST REMOVAL      cyst removed from scrotum   • FOOT SURGERY Right 07/17/2018   • FOOT SURGERY Left 02/04/2019   • TOE AMPUTATION Right     right toe removed D/T infected cut that went to the bone       Past Medical History:   Diagnosis Date   • Appetite loss    • Carpal tunnel syndrome on left     carpal tunnel on left hand   • Diabetic neuropathy (CMS/HCC)    • DM2 (diabetes mellitus, type 2) (CMS/HCC)    • History of angina    • Hyperlipidemia    • Hypogonadism in male    • Obesity    • Sleep apnea    • Unsteady gait        History reviewed. No pertinent family history.    Social History     Socioeconomic History   • Marital status:      Spouse name: Not on file   • Number of children: Not on file   • Years of education: Not on file   • Highest education level: Not on file   Tobacco Use   • Smoking status: Current Every Day Smoker     Packs/day: 1.00     Types: Cigarettes   • Smokeless tobacco: Never Used   Substance and Sexual Activity   • Alcohol use: No     Frequency: Never   • Drug use: Yes     Frequency: 1.0 times per week     Types: Marijuana     Comment: socially           ECG 12 Lead  Date/Time: 1/23/2020 11:16 AM  Performed by: Jose G Rm MD  Authorized by: Jose G Rm MD   Comparison: compared with previous ECG   Similar to previous ECG  Comments: Sinus bradycardia with sinus arrhythmia.  Baseline artifact 57/min nonspecific ST-T wave changes no change from 1/29/2019              Objective:       Physical Exam    /80 (BP Location: Left arm, Patient Position: Sitting, Cuff Size: Large Adult)   Pulse 65   Ht 177.8 cm  "(70\")   Wt 129 kg (284 lb 8 oz)   SpO2 96%   BMI 40.82 kg/m²   The patient is alert, oriented and in no distress.    Vital signs as noted above.    Head and neck revealed no carotid bruits or jugular venous distension.  No thyromegaly or lymphadenopathy is present.    Lungs clear.  No wheezing.  Breath sounds are normal bilaterally.    Heart normal first and second heart sounds.  No murmur..  No pericardial rub is present.  No gallop is present.    Abdomen soft and nontender.  No organomegaly is present.    Extremities revealed good peripheral pulses without any pedal edema.    Skin warm and dry.    Musculoskeletal system is grossly normal.    CNS grossly normal.        "

## 2020-06-22 NOTE — TELEPHONE ENCOUNTER
Per Dr.Khan calderon to fill Spiriva for 2 months and he is to f/u in 4-6 weeks. Vlad will call to schedule.

## 2020-08-23 ENCOUNTER — APPOINTMENT (OUTPATIENT)
Dept: CT IMAGING | Facility: HOSPITAL | Age: 70
End: 2020-08-23

## 2020-08-23 ENCOUNTER — HOSPITAL ENCOUNTER (OUTPATIENT)
Facility: HOSPITAL | Age: 70
Setting detail: OBSERVATION
Discharge: HOME | End: 2020-08-24
Attending: INTERNAL MEDICINE | Admitting: INTERNAL MEDICINE

## 2020-08-23 ENCOUNTER — APPOINTMENT (OUTPATIENT)
Dept: GENERAL RADIOLOGY | Facility: HOSPITAL | Age: 70
End: 2020-08-23
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
## 2020-08-23 DIAGNOSIS — S22.069A CLOSED FRACTURE OF SEVENTH THORACIC VERTEBRA, UNSPECIFIED FRACTURE MORPHOLOGY, INITIAL ENCOUNTER (HCC): ICD-10-CM

## 2020-08-23 DIAGNOSIS — R77.8 ELEVATED TROPONIN: ICD-10-CM

## 2020-08-23 PROBLEM — R79.89 ELEVATED TROPONIN: Status: ACTIVE | Noted: 2020-08-23

## 2020-08-23 LAB
ANION GAP SERPL CALCULATED.3IONS-SCNC: 10 MMOL/L (ref 5–15)
APTT PPP: 26.4 SECONDS (ref 24–31)
BASOPHILS # BLD AUTO: 0.1 10*3/MM3 (ref 0–0.2)
BASOPHILS NFR BLD AUTO: 0.9 % (ref 0–1.5)
BUN SERPL-MCNC: 34 MG/DL (ref 8–23)
BUN SERPL-MCNC: ABNORMAL MG/DL
BUN/CREAT SERPL: ABNORMAL
CALCIUM SPEC-SCNC: 9.4 MG/DL (ref 8.6–10.5)
CHLORIDE SERPL-SCNC: 103 MMOL/L (ref 98–107)
CO2 SERPL-SCNC: 28 MMOL/L (ref 22–29)
CREAT SERPL-MCNC: 1.5 MG/DL (ref 0.76–1.27)
DEPRECATED RDW RBC AUTO: 48.1 FL (ref 37–54)
EOSINOPHIL # BLD AUTO: 0.4 10*3/MM3 (ref 0–0.4)
EOSINOPHIL NFR BLD AUTO: 3.9 % (ref 0.3–6.2)
ERYTHROCYTE [DISTWIDTH] IN BLOOD BY AUTOMATED COUNT: 14.8 % (ref 12.3–15.4)
GFR SERPL CREATININE-BSD FRML MDRD: 46 ML/MIN/1.73
GLUCOSE BLDC GLUCOMTR-MCNC: 229 MG/DL (ref 70–105)
GLUCOSE BLDC GLUCOMTR-MCNC: 255 MG/DL (ref 70–105)
GLUCOSE BLDC GLUCOMTR-MCNC: 259 MG/DL (ref 70–105)
GLUCOSE SERPL-MCNC: 257 MG/DL (ref 65–99)
HCT VFR BLD AUTO: 38.1 % (ref 37.5–51)
HGB BLD-MCNC: 12.5 G/DL (ref 13–17.7)
HOLD SPECIMEN: NORMAL
INR PPP: 0.95 (ref 0.93–1.1)
LYMPHOCYTES # BLD AUTO: 1.5 10*3/MM3 (ref 0.7–3.1)
LYMPHOCYTES NFR BLD AUTO: 15.9 % (ref 19.6–45.3)
MCH RBC QN AUTO: 30.5 PG (ref 26.6–33)
MCHC RBC AUTO-ENTMCNC: 32.9 G/DL (ref 31.5–35.7)
MCV RBC AUTO: 92.6 FL (ref 79–97)
MONOCYTES # BLD AUTO: 0.8 10*3/MM3 (ref 0.1–0.9)
MONOCYTES NFR BLD AUTO: 8.3 % (ref 5–12)
NEUTROPHILS NFR BLD AUTO: 6.8 10*3/MM3 (ref 1.7–7)
NEUTROPHILS NFR BLD AUTO: 71 % (ref 42.7–76)
NRBC BLD AUTO-RTO: 0 /100 WBC (ref 0–0.2)
PLATELET # BLD AUTO: 265 10*3/MM3 (ref 140–450)
PMV BLD AUTO: 8.3 FL (ref 6–12)
POTASSIUM SERPL-SCNC: 4.2 MMOL/L (ref 3.5–5.2)
PROTHROMBIN TIME: 10.4 SECONDS (ref 9.6–11.7)
RBC # BLD AUTO: 4.11 10*6/MM3 (ref 4.14–5.8)
SODIUM SERPL-SCNC: 141 MMOL/L (ref 136–145)
TROPONIN T SERPL-MCNC: 0.03 NG/ML (ref 0–0.03)
WBC # BLD AUTO: 9.6 10*3/MM3 (ref 3.4–10.8)

## 2020-08-23 PROCEDURE — 96375 TX/PRO/DX INJ NEW DRUG ADDON: CPT

## 2020-08-23 PROCEDURE — 85730 THROMBOPLASTIN TIME PARTIAL: CPT | Performed by: PHYSICIAN ASSISTANT

## 2020-08-23 PROCEDURE — 94799 UNLISTED PULMONARY SVC/PX: CPT

## 2020-08-23 PROCEDURE — 25010000002 ONDANSETRON PER 1 MG: Performed by: EMERGENCY MEDICINE

## 2020-08-23 PROCEDURE — 80048 BASIC METABOLIC PNL TOTAL CA: CPT | Performed by: PHYSICIAN ASSISTANT

## 2020-08-23 PROCEDURE — 84484 ASSAY OF TROPONIN QUANT: CPT | Performed by: INTERNAL MEDICINE

## 2020-08-23 PROCEDURE — 63710000001 INSULIN ISOPHANE & REGULAR PER 5 UNITS: Performed by: INTERNAL MEDICINE

## 2020-08-23 PROCEDURE — 25010000002 HYDROMORPHONE PER 4 MG: Performed by: INTERNAL MEDICINE

## 2020-08-23 PROCEDURE — 72128 CT CHEST SPINE W/O DYE: CPT

## 2020-08-23 PROCEDURE — 96376 TX/PRO/DX INJ SAME DRUG ADON: CPT

## 2020-08-23 PROCEDURE — 25010000002 HYDROMORPHONE PER 4 MG: Performed by: EMERGENCY MEDICINE

## 2020-08-23 PROCEDURE — 71045 X-RAY EXAM CHEST 1 VIEW: CPT

## 2020-08-23 PROCEDURE — 99219 PR INITIAL OBSERVATION CARE/DAY 50 MINUTES: CPT | Performed by: INTERNAL MEDICINE

## 2020-08-23 PROCEDURE — 94640 AIRWAY INHALATION TREATMENT: CPT

## 2020-08-23 PROCEDURE — G0378 HOSPITAL OBSERVATION PER HR: HCPCS

## 2020-08-23 PROCEDURE — 96374 THER/PROPH/DIAG INJ IV PUSH: CPT

## 2020-08-23 PROCEDURE — 63710000001 INSULIN LISPRO (HUMAN) PER 5 UNITS: Performed by: INTERNAL MEDICINE

## 2020-08-23 PROCEDURE — 99284 EMERGENCY DEPT VISIT MOD MDM: CPT

## 2020-08-23 PROCEDURE — 93005 ELECTROCARDIOGRAM TRACING: CPT | Performed by: PHYSICIAN ASSISTANT

## 2020-08-23 PROCEDURE — 70450 CT HEAD/BRAIN W/O DYE: CPT

## 2020-08-23 PROCEDURE — 85610 PROTHROMBIN TIME: CPT | Performed by: PHYSICIAN ASSISTANT

## 2020-08-23 PROCEDURE — 85025 COMPLETE CBC W/AUTO DIFF WBC: CPT | Performed by: PHYSICIAN ASSISTANT

## 2020-08-23 PROCEDURE — 82962 GLUCOSE BLOOD TEST: CPT

## 2020-08-23 PROCEDURE — 84484 ASSAY OF TROPONIN QUANT: CPT | Performed by: PHYSICIAN ASSISTANT

## 2020-08-23 RX ORDER — DEXTROSE MONOHYDRATE 25 G/50ML
25 INJECTION, SOLUTION INTRAVENOUS
Status: DISCONTINUED | OUTPATIENT
Start: 2020-08-23 | End: 2020-08-24 | Stop reason: HOSPADM

## 2020-08-23 RX ORDER — ASPIRIN 81 MG/1
324 TABLET, CHEWABLE ORAL ONCE
Status: COMPLETED | OUTPATIENT
Start: 2020-08-23 | End: 2020-08-23

## 2020-08-23 RX ORDER — ENALAPRIL MALEATE 5 MG/1
5 TABLET ORAL 2 TIMES DAILY
Status: DISCONTINUED | OUTPATIENT
Start: 2020-08-23 | End: 2020-08-24 | Stop reason: HOSPADM

## 2020-08-23 RX ORDER — ATORVASTATIN CALCIUM 40 MG/1
40 TABLET, FILM COATED ORAL DAILY
Status: DISCONTINUED | OUTPATIENT
Start: 2020-08-23 | End: 2020-08-24 | Stop reason: HOSPADM

## 2020-08-23 RX ORDER — HYDROCODONE BITARTRATE AND ACETAMINOPHEN 5; 325 MG/1; MG/1
1 TABLET ORAL EVERY 4 HOURS PRN
Status: DISCONTINUED | OUTPATIENT
Start: 2020-08-23 | End: 2020-08-24

## 2020-08-23 RX ORDER — MULTIVITAMIN,THERAPEUTIC
1 TABLET ORAL DAILY
Status: DISCONTINUED | OUTPATIENT
Start: 2020-08-23 | End: 2020-08-24 | Stop reason: HOSPADM

## 2020-08-23 RX ORDER — SODIUM CHLORIDE 0.9 % (FLUSH) 0.9 %
10 SYRINGE (ML) INJECTION AS NEEDED
Status: DISCONTINUED | OUTPATIENT
Start: 2020-08-23 | End: 2020-08-24 | Stop reason: HOSPADM

## 2020-08-23 RX ORDER — SODIUM CHLORIDE 0.9 % (FLUSH) 0.9 %
10 SYRINGE (ML) INJECTION EVERY 12 HOURS SCHEDULED
Status: DISCONTINUED | OUTPATIENT
Start: 2020-08-23 | End: 2020-08-24 | Stop reason: HOSPADM

## 2020-08-23 RX ORDER — POTASSIUM CHLORIDE 7.45 MG/ML
10 INJECTION INTRAVENOUS
Status: DISCONTINUED | OUTPATIENT
Start: 2020-08-23 | End: 2020-08-24 | Stop reason: HOSPADM

## 2020-08-23 RX ORDER — DIGOXIN 125 MCG
125 TABLET ORAL
Status: DISCONTINUED | OUTPATIENT
Start: 2020-08-23 | End: 2020-08-24 | Stop reason: HOSPADM

## 2020-08-23 RX ORDER — ATENOLOL 50 MG/1
100 TABLET ORAL DAILY
Status: DISCONTINUED | OUTPATIENT
Start: 2020-08-23 | End: 2020-08-24 | Stop reason: HOSPADM

## 2020-08-23 RX ORDER — ISOSORBIDE MONONITRATE 60 MG/1
60 TABLET, EXTENDED RELEASE ORAL
Status: DISCONTINUED | OUTPATIENT
Start: 2020-08-23 | End: 2020-08-24 | Stop reason: HOSPADM

## 2020-08-23 RX ORDER — POTASSIUM CHLORIDE 20 MEQ/1
40 TABLET, EXTENDED RELEASE ORAL AS NEEDED
Status: DISCONTINUED | OUTPATIENT
Start: 2020-08-23 | End: 2020-08-24 | Stop reason: HOSPADM

## 2020-08-23 RX ORDER — CALCIUM GLUCONATE 20 MG/ML
2 INJECTION, SOLUTION INTRAVENOUS AS NEEDED
Status: DISCONTINUED | OUTPATIENT
Start: 2020-08-23 | End: 2020-08-24 | Stop reason: HOSPADM

## 2020-08-23 RX ORDER — ACETAMINOPHEN 325 MG/1
325 TABLET ORAL EVERY 4 HOURS PRN
Status: DISCONTINUED | OUTPATIENT
Start: 2020-08-23 | End: 2020-08-24 | Stop reason: HOSPADM

## 2020-08-23 RX ORDER — NICOTINE 21 MG/24HR
1 PATCH, TRANSDERMAL 24 HOURS TRANSDERMAL EVERY 24 HOURS
Status: DISCONTINUED | OUTPATIENT
Start: 2020-08-23 | End: 2020-08-24 | Stop reason: HOSPADM

## 2020-08-23 RX ORDER — SODIUM CHLORIDE 9 MG/ML
100 INJECTION, SOLUTION INTRAVENOUS CONTINUOUS
Status: DISCONTINUED | OUTPATIENT
Start: 2020-08-23 | End: 2020-08-24 | Stop reason: HOSPADM

## 2020-08-23 RX ORDER — DULOXETIN HYDROCHLORIDE 20 MG/1
40 CAPSULE, DELAYED RELEASE ORAL DAILY
Status: DISCONTINUED | OUTPATIENT
Start: 2020-08-23 | End: 2020-08-24 | Stop reason: HOSPADM

## 2020-08-23 RX ORDER — NITROGLYCERIN 0.4 MG/1
0.4 TABLET SUBLINGUAL
Status: DISCONTINUED | OUTPATIENT
Start: 2020-08-23 | End: 2020-08-24 | Stop reason: HOSPADM

## 2020-08-23 RX ORDER — ACETAMINOPHEN 650 MG/1
650 SUPPOSITORY RECTAL EVERY 4 HOURS PRN
Status: DISCONTINUED | OUTPATIENT
Start: 2020-08-23 | End: 2020-08-24 | Stop reason: HOSPADM

## 2020-08-23 RX ORDER — MAGNESIUM SULFATE HEPTAHYDRATE 40 MG/ML
2 INJECTION, SOLUTION INTRAVENOUS AS NEEDED
Status: DISCONTINUED | OUTPATIENT
Start: 2020-08-23 | End: 2020-08-24 | Stop reason: HOSPADM

## 2020-08-23 RX ORDER — HYDRALAZINE HYDROCHLORIDE 20 MG/ML
10 INJECTION INTRAMUSCULAR; INTRAVENOUS EVERY 6 HOURS PRN
Status: DISCONTINUED | OUTPATIENT
Start: 2020-08-23 | End: 2020-08-24 | Stop reason: HOSPADM

## 2020-08-23 RX ORDER — HYDROMORPHONE HCL 110MG/55ML
0.5 PATIENT CONTROLLED ANALGESIA SYRINGE INTRAVENOUS ONCE
Status: COMPLETED | OUTPATIENT
Start: 2020-08-23 | End: 2020-08-23

## 2020-08-23 RX ORDER — CHOLECALCIFEROL (VITAMIN D3) 125 MCG
5 CAPSULE ORAL NIGHTLY PRN
Status: DISCONTINUED | OUTPATIENT
Start: 2020-08-23 | End: 2020-08-24 | Stop reason: HOSPADM

## 2020-08-23 RX ORDER — DOCUSATE SODIUM 100 MG/1
100 CAPSULE, LIQUID FILLED ORAL 2 TIMES DAILY PRN
Status: DISCONTINUED | OUTPATIENT
Start: 2020-08-23 | End: 2020-08-24 | Stop reason: HOSPADM

## 2020-08-23 RX ORDER — BUMETANIDE 1 MG/1
2 TABLET ORAL
Status: DISCONTINUED | OUTPATIENT
Start: 2020-08-23 | End: 2020-08-24 | Stop reason: HOSPADM

## 2020-08-23 RX ORDER — ONDANSETRON 2 MG/ML
4 INJECTION INTRAMUSCULAR; INTRAVENOUS EVERY 6 HOURS PRN
Status: DISCONTINUED | OUTPATIENT
Start: 2020-08-23 | End: 2020-08-24 | Stop reason: HOSPADM

## 2020-08-23 RX ORDER — GABAPENTIN 300 MG/1
600 CAPSULE ORAL EVERY 8 HOURS SCHEDULED
Status: DISCONTINUED | OUTPATIENT
Start: 2020-08-23 | End: 2020-08-24 | Stop reason: HOSPADM

## 2020-08-23 RX ORDER — ONDANSETRON 2 MG/ML
4 INJECTION INTRAMUSCULAR; INTRAVENOUS ONCE
Status: COMPLETED | OUTPATIENT
Start: 2020-08-23 | End: 2020-08-23

## 2020-08-23 RX ORDER — POTASSIUM CHLORIDE 1.5 G/1.77G
40 POWDER, FOR SOLUTION ORAL AS NEEDED
Status: DISCONTINUED | OUTPATIENT
Start: 2020-08-23 | End: 2020-08-24 | Stop reason: HOSPADM

## 2020-08-23 RX ORDER — HYDROCODONE BITARTRATE AND ACETAMINOPHEN 5; 325 MG/1; MG/1
1 TABLET ORAL EVERY 12 HOURS PRN
Status: DISCONTINUED | OUTPATIENT
Start: 2020-08-23 | End: 2020-08-23

## 2020-08-23 RX ORDER — NICOTINE POLACRILEX 4 MG
15 LOZENGE BUCCAL
Status: DISCONTINUED | OUTPATIENT
Start: 2020-08-23 | End: 2020-08-24 | Stop reason: HOSPADM

## 2020-08-23 RX ORDER — MAGNESIUM SULFATE HEPTAHYDRATE 40 MG/ML
4 INJECTION, SOLUTION INTRAVENOUS AS NEEDED
Status: DISCONTINUED | OUTPATIENT
Start: 2020-08-23 | End: 2020-08-24 | Stop reason: HOSPADM

## 2020-08-23 RX ORDER — CALCIUM GLUCONATE 20 MG/ML
1 INJECTION, SOLUTION INTRAVENOUS AS NEEDED
Status: DISCONTINUED | OUTPATIENT
Start: 2020-08-23 | End: 2020-08-24 | Stop reason: HOSPADM

## 2020-08-23 RX ORDER — ACETAMINOPHEN 160 MG/5ML
325 SOLUTION ORAL EVERY 4 HOURS PRN
Status: DISCONTINUED | OUTPATIENT
Start: 2020-08-23 | End: 2020-08-24 | Stop reason: HOSPADM

## 2020-08-23 RX ORDER — BISACODYL 10 MG
10 SUPPOSITORY, RECTAL RECTAL DAILY PRN
Status: DISCONTINUED | OUTPATIENT
Start: 2020-08-23 | End: 2020-08-24 | Stop reason: HOSPADM

## 2020-08-23 RX ADMIN — BUMETANIDE 2 MG: 1 TABLET ORAL at 13:43

## 2020-08-23 RX ADMIN — Medication 10 ML: at 13:58

## 2020-08-23 RX ADMIN — ATENOLOL 100 MG: 50 TABLET ORAL at 13:44

## 2020-08-23 RX ADMIN — BUMETANIDE 2 MG: 1 TABLET ORAL at 18:10

## 2020-08-23 RX ADMIN — SODIUM CHLORIDE 100 ML/HR: 900 INJECTION, SOLUTION INTRAVENOUS at 14:48

## 2020-08-23 RX ADMIN — INSULIN HUMAN 50 UNITS: 100 INJECTION, SUSPENSION SUBCUTANEOUS at 18:11

## 2020-08-23 RX ADMIN — ONDANSETRON 4 MG: 2 INJECTION, SOLUTION INTRAMUSCULAR; INTRAVENOUS at 10:46

## 2020-08-23 RX ADMIN — IPRATROPIUM BROMIDE 0.5 MG: 0.5 SOLUTION RESPIRATORY (INHALATION) at 15:31

## 2020-08-23 RX ADMIN — METFORMIN HYDROCHLORIDE 500 MG: 500 TABLET ORAL at 18:12

## 2020-08-23 RX ADMIN — HYDROCODONE BITARTRATE AND ACETAMINOPHEN 1 TABLET: 5; 325 TABLET ORAL at 21:32

## 2020-08-23 RX ADMIN — ATORVASTATIN CALCIUM 40 MG: 40 TABLET, FILM COATED ORAL at 13:43

## 2020-08-23 RX ADMIN — DIGOXIN 125 MCG: 0.12 TABLET ORAL at 13:44

## 2020-08-23 RX ADMIN — HYDROMORPHONE HYDROCHLORIDE 0.5 MG: 2 INJECTION, SOLUTION INTRAMUSCULAR; INTRAVENOUS; SUBCUTANEOUS at 10:46

## 2020-08-23 RX ADMIN — GABAPENTIN 600 MG: 300 CAPSULE ORAL at 13:44

## 2020-08-23 RX ADMIN — Medication 10 ML: at 21:33

## 2020-08-23 RX ADMIN — ASPIRIN 81 MG 324 MG: 81 TABLET ORAL at 10:46

## 2020-08-23 RX ADMIN — GABAPENTIN 600 MG: 300 CAPSULE ORAL at 21:32

## 2020-08-23 RX ADMIN — ENALAPRIL MALEATE 5 MG: 5 TABLET ORAL at 13:44

## 2020-08-23 RX ADMIN — HYDROMORPHONE HYDROCHLORIDE 0.5 MG: 2 INJECTION, SOLUTION INTRAMUSCULAR; INTRAVENOUS; SUBCUTANEOUS at 14:47

## 2020-08-23 RX ADMIN — ISOSORBIDE MONONITRATE 60 MG: 60 TABLET, EXTENDED RELEASE ORAL at 18:11

## 2020-08-23 RX ADMIN — HYDROCODONE BITARTRATE AND ACETAMINOPHEN 1 TABLET: 5; 325 TABLET ORAL at 13:43

## 2020-08-23 RX ADMIN — IPRATROPIUM BROMIDE 0.5 MG: 0.5 SOLUTION RESPIRATORY (INHALATION) at 19:56

## 2020-08-23 RX ADMIN — THERA TABS 1 TABLET: TAB at 13:44

## 2020-08-23 RX ADMIN — INSULIN LISPRO 4 UNITS: 100 INJECTION, SOLUTION INTRAVENOUS; SUBCUTANEOUS at 18:09

## 2020-08-23 RX ADMIN — NICOTINE 1 PATCH: 21 PATCH TRANSDERMAL at 13:44

## 2020-08-23 RX ADMIN — ENALAPRIL MALEATE 5 MG: 5 TABLET ORAL at 22:33

## 2020-08-23 NOTE — ED PROVIDER NOTES
Subjective   Patient is a 69-year-old male who presents via EMS after a fall just prior to arrival to the ED.  Patient states that he was turning around to open the door in the kitchen for his dogs when he lost his balance and fell.  Patient states that he fell backwards and did hit his head on the floor however he denies LOC.  Patient is complaining of some mid back pain and a light headache he denies gradual onset of his headache denies thunderclap or worst negative his life.  Patient states his back pain is worse with certain movements but currently rates his pain a 0/10 when he is not ambulatory but an 8 out of 10 with movement.  Patient denies any one-sided numbness weakness slurred speech or facial droop.  He also denies any weakness of his upper extremities saddle anesthesia, bladder bowel incontinence.  Patient is currently on Plavix.  Patient denies any nausea vomiting, sensitivity to light or loud noise, chest pain or shortness of breath, recent fever, diarrhea or constipation, urinary symptoms include dysuria hematuria.  Patient reports he does have lower extremity edema which is chronic for him.  Per patient's wife he has had some increased memory loss over the last several months is fallen more often over the last few months as well.  Patient's wife states she has been talking to his primary care in regards to his memory.  Patient denies any dizziness or lightheadedness when he fell today.  Patient also denies any recent travel or known sick contacts.      History provided by:  Patient, EMS personnel and spouse      Review of Systems   Constitutional: Negative.    Eyes: Negative for photophobia and visual disturbance.   Respiratory: Negative.    Cardiovascular: Negative.    Gastrointestinal: Negative for abdominal distention, abdominal pain, constipation, diarrhea, nausea and vomiting.   Genitourinary: Negative.    Musculoskeletal: Positive for back pain. Negative for arthralgias, gait problem, joint  swelling, myalgias, neck pain and neck stiffness.   Skin: Positive for wound.   Neurological: Positive for headaches. Negative for dizziness, tremors, seizures, syncope, facial asymmetry, speech difficulty, weakness, light-headedness and numbness.       Past Medical History:   Diagnosis Date   • Appetite loss    • Carpal tunnel syndrome on left     carpal tunnel on left hand   • Diabetic neuropathy (CMS/HCC)    • DM2 (diabetes mellitus, type 2) (CMS/HCC)    • History of angina    • Hyperlipidemia    • Hypogonadism in male    • Obesity    • Sleep apnea    • Unsteady gait        Allergies   Allergen Reactions   • Codeine Itching       Past Surgical History:   Procedure Laterality Date   • ANGIOPLASTY      X2   • APPENDECTOMY     • CARDIAC CATHETERIZATION  11/13/2015   • CARPAL TUNNEL RELEASE Left 04/29/2018    carpal tunnel- lt hand// other hand surgeries    • CATARACT EXTRACTION, BILATERAL  2002    Dr. uLx Acosta   • CHOLECYSTECTOMY     • COLON RESECTION  2005   • CORONARY ANGIOPLASTY     • CORONARY ANGIOPLASTY WITH STENT PLACEMENT  11/13/2015    PTCA stent to proximal in stent and mid to distal lad   • CORONARY ANGIOPLASTY WITH STENT PLACEMENT  09/16/2016    PTCA stent to mid lad and stent to vein graft to marginal   • CORONARY ARTERY BYPASS GRAFT  2005    @ [facility]   • CYST REMOVAL      cyst removed from scrotum   • FOOT SURGERY Right 07/17/2018   • FOOT SURGERY Left 02/04/2019   • TOE AMPUTATION Right     right toe removed D/T infected cut that went to the bone       No family history on file.    Social History     Socioeconomic History   • Marital status:      Spouse name: Not on file   • Number of children: Not on file   • Years of education: Not on file   • Highest education level: Not on file   Tobacco Use   • Smoking status: Current Every Day Smoker     Packs/day: 1.00     Types: Cigarettes   • Smokeless tobacco: Never Used   Substance and Sexual Activity   • Alcohol use: No     Frequency: Never   • Drug  use: Yes     Frequency: 1.0 times per week     Types: Marijuana     Comment: socially           Objective   Physical Exam   Constitutional: He is oriented to person, place, and time. He appears well-developed and well-nourished. No distress.   HENT:   Head: Normocephalic. Head is without raccoon's eyes and without Sylvester's sign.       Mouth/Throat: Oropharynx is clear and moist.   Eyes: Pupils are equal, round, and reactive to light. EOM are normal. No scleral icterus.   Cardiovascular: Normal rate, regular rhythm, normal heart sounds and intact distal pulses. Exam reveals no gallop and no friction rub.   No murmur heard.  3+ pitting edema noted bilateral lower extremities no overlying erythema or warmth   Pulmonary/Chest: Effort normal and breath sounds normal. No stridor. No respiratory distress. He has no wheezes. He has no rales. He exhibits no tenderness.   Abdominal: Soft. Bowel sounds are normal. He exhibits no distension, no fluid wave, no ascites and no mass. There is no tenderness. There is no rigidity, no rebound, no guarding and no CVA tenderness. No hernia.   Musculoskeletal:   Back:  Cervical, thoracic, lumbar spine or midline with no midline tenderness or step-offs,.  Spinal musculature soft, nontender, no palpable mass spasm, no overlying erythema, no ecchymosis. Range of motion is present. distal muscle strength is 5/5.     Moving all extremities freely without any joint pain or tenderness.  Peripheral pulses are intact compartments are soft     Neurological: He is alert and oriented to person, place, and time. No cranial nerve deficit or sensory deficit. GCS eye subscore is 4. GCS verbal subscore is 5. GCS motor subscore is 6.   Normal and equal sensation strength throughout moving all extremities freely.   Skin: Skin is warm. Capillary refill takes less than 2 seconds. No rash noted. He is not diaphoretic. No erythema. No pallor.   Skin tear noted along the dorsal aspect of the left elbow with no  "active bleeding or drainage no surrounding edema or erythema.  No tenderness noted to palpation.   Psychiatric: He has a normal mood and affect. His behavior is normal.   Nursing note and vitals reviewed.      Procedures           ED Course    /50 (Patient Position: Standing)   Pulse 62   Temp 98.4 °F (36.9 °C)   Resp 22   Ht 177.8 cm (70\")   Wt 120 kg (265 lb)   SpO2 97%   BMI 38.02 kg/m²   Medications   sodium chloride 0.9 % flush 10 mL (has no administration in time range)   HYDROmorphone (DILAUDID) injection 0.5 mg (0.5 mg Intravenous Given 8/23/20 1046)   ondansetron (ZOFRAN) injection 4 mg (4 mg Intravenous Given 8/23/20 1046)   aspirin chewable tablet 324 mg (324 mg Oral Given 8/23/20 1046)     Labs Reviewed   BASIC METABOLIC PANEL - Abnormal; Notable for the following components:       Result Value    Glucose 257 (*)     Creatinine 1.50 (*)     eGFR Non  Amer 46 (*)     All other components within normal limits    Narrative:     GFR Normal >60  Chronic Kidney Disease <60  Kidney Failure <15     CBC WITH AUTO DIFFERENTIAL - Abnormal; Notable for the following components:    RBC 4.11 (*)     Hemoglobin 12.5 (*)     Lymphocyte % 15.9 (*)     All other components within normal limits   TROPONIN (IN-HOUSE) - Abnormal; Notable for the following components:    Troponin T 0.033 (*)     All other components within normal limits    Narrative:     Troponin T Reference Range:  <= 0.03 ng/mL-   Negative for AMI  >0.03 ng/mL-     Abnormal for myocardial necrosis.  Clinicians would have to utilize clinical acumen, EKG, Troponin and serial changes to determine if it is an Acute Myocardial Infarction or myocardial injury due to an underlying chronic condition.       Results may be falsely decreased if patient taking Biotin.     BUN - Abnormal; Notable for the following components:    BUN 34 (*)     All other components within normal limits   APTT - Normal   PROTIME-INR - Normal   URINALYSIS W/ MICROSCOPIC " IF INDICATED (NO CULTURE)   CBC AND DIFFERENTIAL    Narrative:     The following orders were created for panel order CBC & Differential.  Procedure                               Abnormality         Status                     ---------                               -----------         ------                     CBC Auto Differential[[id number]]        Abnormal            Final result                 Please view results for these tests on the individual orders.   EXTRA TUBES    Narrative:     The following orders were created for panel order Extra Tubes.  Procedure                               Abnormality         Status                     ---------                               -----------         ------                     Gold Top - SST[[id number]]                                   Final result                 Please view results for these tests on the individual orders.   GOLD TOP - SST     Ct Head Without Contrast    Result Date: 8/23/2020   1. Left frontal scalp laceration near the vertex. No acute intracranial abnormality. 2. 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 Mild to moderate amount of chronic small vessel ischemic disease.  Electronically Signed By-Leonard Wilcox DO. On:8/23/2020 10:49 AM This report was finalized on [id number] by  Leonard Wilcox DO..    Ct Thoracic Spine Without Contrast    Result Date: 8/23/2020  Subtle horizontal fracture line through the superior portion of the T7 vertebral body and in disruption of ossification of the T6-7 disc space level. This is superimposed on extensive bone demineralization and evidence of ankylosing spondylitis. No other acute bony abnormality is demonstrated. No central canal stenosis or neural foraminal narrowing or paraspinal fluid collection. MRI may be helpful to evaluate for any other subtle fractures in this particular patient.  Electronically Signed By-Leonard Wilcox DO. On:8/23/2020 11:03 AM This report was finalized on  [id number] by  Leonard Wilcox DO..                                           MDM  Number of Diagnoses or Management Options  Closed fracture of seventh thoracic vertebra, unspecified fracture morphology, initial encounter (CMS/[facility]):   Elevated troponin:   Fall, initial encounter:   Diagnosis management comments: Chart Review:  Comorbidity: Diabetes, diabetic neuropathy, hyperlipidemia, hypogonadism, sleep apnea, obesity  Differentials: Fracture disc herniation contusion CVA electrolyte abnormality ACS     ;this list is not all inclusive and does not constitute the entirety of considered causes  ECG: Interpreted by myself and Dr. Amador shows sinus rhythm rate 65 prolonged AR interval nonspecific T wave abnormalities unchanged from previous EKG was reviewed from 1/29/2019  Labs: Troponin elevated at 0.033 no previous to review.  BMP shows glucose 257 BUN 34 creatinine 1.5 sodium 141 potassium 4.2.  CBC shows WC 9.6 hemoglobin 12.5 hematocrit 38.1 platelets 265.  Pro time INR PTT within normal limits as above.  Imaging: Was interpreted by physician and reviewed by myself:  Ct Head Without Contrast  Result Date: 8/23/2020   1. Left frontal scalp laceration near the vertex. No acute intracranial abnormality. 2. 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 Mild to moderate amount of chronic small vessel ischemic disease.  Electronically Signed By-Leonard Wilcox DO. On:8/23/2020 10:49 AM This report was finalized on [id number] by  Leonard Wilcox DO..    Ct Thoracic Spine Without Contrast  Result Date: 8/23/2020  Subtle horizontal fracture line through the superior portion of the T7 vertebral body and in disruption of ossification of the T6-7 disc space level. This is superimposed on extensive bone demineralization and evidence of ankylosing spondylitis. No other acute bony abnormality is demonstrated. No central canal stenosis or neural foraminal narrowing or paraspinal fluid collection. MRI  may be helpful to evaluate for any other subtle fractures in this particular patient.  Electronically Signed By-Leonard Wilcox DO. On:8/23/2020 11:03 AM This report was finalized on [id number] by  Leonard Wilcox DO..      Disposition/Treatment:  Appropriate PPE was worn during exam and throughout all encounters with the patient.  While in the ED IV was placed and labs were obtained patient was afebrile and appeared nontoxic orthostatics were negative.  Patient was given Dilaudid and Zofran for his back pain.  Upon reassessment patient is resting quietly CT of head showed no intracranial abnormality as above remarks on a scalp laceration however on physical exam there is no laceration noted there is a small amount of ecchymosis in the region noted on the CT skin is intact.  Patient shows no acute cranial focal neural deficits on exam.  CT of thoracic spine does show a subtle horizontal fracture line through the superior portion of T7 vertebral body described above.  Lab results were significant for an elevated troponin of 0.033 patient continues to deny any chest pain EKG is unchanged from previous EKG that was reviewed on 1/29/2019 as above.  CBC was fairly unremarkable as above.  Urinalysis pending upon admission.  Metabolic panel significant for an elevated glucose of 257 BUN 34 creatinine 1.5 old charts were reviewed patient's kidney function does seem chronic 11 months ago creatinine is 1.7 BUN was 54.  Results and findings were discussed with the patient and family via telephone who voiced understanding admission for  T7 vertebral body fracture and cardiac work-up with elevated troponin.  Patient main stable throughout ED stay.  Spoke to Dr. Morris with hospitalist group agreed for admission.  Also spoke to Dr. Haddad on-call for neurosurgery states he will consult and during his admission.       Amount and/or Complexity of Data Reviewed  Clinical lab tests: reviewed  Tests in the radiology section of CPT®:  reviewed  Tests in the medicine section of CPT®: reviewed        Final diagnoses:   Fall, initial encounter   Elevated troponin   Closed fracture of seventh thoracic vertebra, unspecified fracture morphology, initial encounter (CMS/[facility])            Alejandro Gutierrez PA  08/23/20 1136

## 2020-08-23 NOTE — PLAN OF CARE
Problem: Patient Care Overview  Goal: Plan of Care Review  Outcome: Ongoing (interventions implemented as appropriate)  Flowsheets (Taken 8/23/2020 4574)  Progress: improving  Plan of Care Reviewed With: patient  Outcome Summary: Pt is resting in bed with minimal complaints, will continue to monitor

## 2020-08-23 NOTE — H&P
[facility] Medicine Services      Patient Name: Benson Mclean  : 1950  MRN: [id number]  Primary Care Physician: Samantha Zabala APRN  Date of admission: 2020    Patient Care Team:  Samantha Zabala APRN as PCP - General  Samantha Zabala APRN as PCP - Family Medicine  Jose G Rm MD as Consulting Physician (Cardiology)          Subjective   History Present Illness     Chief Complaint:   Chief Complaint   Patient presents with   • Fall     Elements of old note copy to maintain comprehension of patient details.  The note has otherwise been modified to reflect the patient's most recent condition.      Mr. Mclean is a 69 y.o. who presents to [facility] complaining of pain after a fall just prior to arrival to the ED.  Patient states that he was turning around to open the door in the kitchen for his dogs when he lost his balance and fell.  Patient states that he fell backwards and did hit his head on the floor however he denies LOC.  Patient is complaining of some mid back pain and a light headache he denies gradual onset of his headache denies thunderclap or worst negative his life.  Patient states his back pain is worse with certain movements but currently rates his pain a 0/10 when he is not ambulatory but an 8 out of 10 with movement.  Patient denies any one-sided numbness weakness slurred speech or facial droop.  He also denies any weakness of his upper extremities saddle anesthesia, bladder bowel incontinence.  Patient is currently on Plavix.  Patient denies any nausea vomiting, sensitivity to light or loud noise, chest pain or shortness of breath, recent fever, diarrhea or constipation, urinary symptoms include dysuria hematuria.  Patient reports he does have lower extremity edema which is chronic for him.  Per patient's wife he has had some increased memory loss over the last several months is fallen more often over the last few months as well.  Patient's  wife states she has been talking to his primary care in regards to his memory.  Patient denies any dizziness or lightheadedness when he fell today.  Patient also denies any recent travel or known sick contacts.        Review of Systems   Constitutional: Negative.    Eyes: Negative for photophobia and visual disturbance.   Respiratory: Negative.    Cardiovascular: Negative.    Gastrointestinal: Negative for abdominal distention, abdominal pain, constipation, diarrhea, nausea and vomiting.   Genitourinary: Negative.    Musculoskeletal: Positive for back pain. Negative for arthralgias, gait problem, joint swelling, myalgias, neck pain and neck stiffness.   Skin: Positive for wound.   Neurological: Positive for headaches. Negative for dizziness, tremors, seizures, syncope, facial asymmetry, speech difficulty, weakness, light-headedness and numbness.      ROS        Personal History     Past Medical History:   Past Medical History:   Diagnosis Date   • Appetite loss    • Carpal tunnel syndrome on left     carpal tunnel on left hand   • Diabetic neuropathy (CMS/HCC)    • DM2 (diabetes mellitus, type 2) (CMS/HCC)    • History of angina    • Hyperlipidemia    • Hypogonadism in male    • Obesity    • Sleep apnea    • Unsteady gait        Surgical History:      Past Surgical History:   Procedure Laterality Date   • ANGIOPLASTY      X2   • APPENDECTOMY     • CARDIAC CATHETERIZATION  11/13/2015   • CARPAL TUNNEL RELEASE Left 04/29/2018    carpal tunnel- lt hand// other hand surgeries    • CATARACT EXTRACTION, BILATERAL  2002    Dr. Lux Acosta   • CHOLECYSTECTOMY     • COLON RESECTION  2005   • CORONARY ANGIOPLASTY     • CORONARY ANGIOPLASTY WITH STENT PLACEMENT  11/13/2015    PTCA stent to proximal in stent and mid to distal lad   • CORONARY ANGIOPLASTY WITH STENT PLACEMENT  09/16/2016    PTCA stent to mid lad and stent to vein graft to marginal   • CORONARY ARTERY BYPASS GRAFT  2005    @ [facility]   • CYST REMOVAL      cyst removed  from scrotum   • FOOT SURGERY Right 07/17/2018   • FOOT SURGERY Left 02/04/2019   • TOE AMPUTATION Right     right toe removed D/T infected cut that went to the bone           Family History: family history is not on file. Otherwise pertinent FHx was reviewed and unremarkable.     Social History:  reports that he has been smoking cigarettes. He has been smoking about 1.00 pack per day. He has never used smokeless tobacco. He reports that he has current or past drug history. Drug: Marijuana. Frequency: 1.00 time per week. He reports that he does not drink alcohol.      Medications:  Prior to Admission medications    Medication Sig Start Date End Date Taking? Authorizing Provider   aspirin 325 MG tablet Take 325 mg by mouth Daily.   Yes Timo Austin MD   atenolol (TENORMIN) 100 MG tablet Take 100 mg by mouth Daily.   Yes Timo Austin MD   atorvastatin (LIPITOR) 40 MG tablet Take 40 mg by mouth Daily.   Yes Timo Austin MD   bumetanide (BUMEX) 2 MG tablet Take 2 mg by mouth 2 (Two) Times a Day.   Yes Timo Austin MD   clopidogrel (PLAVIX) 75 MG tablet Take 75 mg by mouth Daily.   Yes Timo Austin MD   digoxin (LANOXIN) 125 MCG tablet Take 125 mcg by mouth Daily.   Yes Timo Austin MD   DULoxetine HCl 40 MG capsule delayed-release particles Take 40 mg by mouth Daily. 11/26/19  Yes Timo Austin MD   enalapril (VASOTEC) 5 MG tablet Take 5 mg by mouth 2 (Two) Times a Day. 6/4/19  Yes Timo Austin MD   gabapentin (NEURONTIN) 600 MG tablet Take 600 mg by mouth 3 (Three) Times a Day.   Yes Timo Austin MD   HYDROcodone-acetaminophen (NORCO) 5-325 MG per tablet Take 1 tablet by mouth Every 12 (Twelve) Hours As Needed.   Yes Timo Austin MD   insulin NPH-insulin regular (humuLIN 70/30,novoLIN 70/30) (70-30) 100 UNIT/ML injection Inject 50 Units under the skin into the appropriate area as directed 2 (Two) Times a Day With Meals.   Yes  ProviderTimo MD   isosorbide mononitrate (IMDUR) 60 MG 24 hr tablet Take 60 mg by mouth 2 (Two) Times a Day.   Yes Timo Austin MD   metFORMIN (GLUCOPHAGE) 500 MG tablet Take 500 mg by mouth 2 (Two) Times a Day With Meals.   Yes Timo Austin MD   Multiple Vitamins-Minerals (MULTIVITAMIN WITH MINERALS) tablet tablet Take 1 tablet by mouth Daily.   Yes Timo Austin MD   tiotropium (Spiriva HandiHaler) 18 MCG per inhalation capsule Place 1 capsule into inhaler and inhale Daily. 6/22/20  Yes Grayson Verduzco MD   doxycycline (VIBRAMYCIN) 100 MG capsule Take 100 mg by mouth 2 (Two) Times a Day.  8/23/20  Timo Austin MD   sulfamethoxazole-trimethoprim (BACTRIM DS,SEPTRA DS) 800-160 MG per tablet TK 1 T PO BID. FINISH ALL OF THIS MEDICATION 1/15/20 8/23/20  Timo Austin MD       Allergies:    Allergies   Allergen Reactions   • Codeine Itching       Objective   Objective     Vital Signs  Temp:  [98.3 °F (36.8 °C)-98.4 °F (36.9 °C)] 98.3 °F (36.8 °C)  Heart Rate:  [58-70] 70  Resp:  [15-22] 15  BP: (160-198)/(50-63) 160/59  SpO2:  [97 %] 97 %  on   ;   Device (Oxygen Therapy): room air  Body mass index is 39.73 kg/m².    Constitutional: He is oriented to person, place, and time. He appears well-developed and well-nourished. No distress.   HENT:   Head: Normocephalic. Head is without raccoon's eyes and without Sylvester's sign.       Mouth/Throat: Oropharynx is clear and moist.   Eyes: Pupils are equal, round, and reactive to light. EOM are normal. No scleral icterus.   Cardiovascular: Normal rate, regular rhythm, normal heart sounds and intact distal pulses. Exam reveals no gallop and no friction rub.   No murmur heard.  3+ pitting edema noted bilateral lower extremities no overlying erythema or warmth   Pulmonary/Chest: Effort normal and breath sounds normal. No stridor. No respiratory distress. He has no wheezes. He has no rales. He exhibits no tenderness.    Abdominal: Soft. Bowel sounds are normal. He exhibits no distension, no fluid wave, no ascites and no mass. There is no tenderness. There is no rigidity, no rebound, no guarding and no CVA tenderness. No hernia.   Musculoskeletal:   Back:  Cervical, thoracic, lumbar spine or midline with no midline tenderness or step-offs,.  Spinal musculature soft, nontender, no palpable mass spasm, no overlying erythema, no ecchymosis. Range of motion is present. distal muscle strength is 5/5.     Moving all extremities freely without any joint pain or tenderness.  Peripheral pulses are intact compartments are soft     Neurological: He is alert and oriented to person, place, and time. No cranial nerve deficit or sensory deficit. GCS eye subscore is 4. GCS verbal subscore is 5. GCS motor subscore is 6.   Normal and equal sensation strength throughout moving all extremities freely.   Skin: Skin is warm. Capillary refill takes less than 2 seconds. No rash noted. He is not diaphoretic. No erythema. No pallor.   Skin tear noted along the dorsal aspect of the left elbow with no active bleeding or drainage no surrounding edema or erythema.  No tenderness noted to palpation.   Psychiatric: He has a normal mood and affect. His behavior is normal.   Nursing note and vitals reviewed.      Physical Exam    Results Review:  I have personally reviewed most recent cardiac tracings, lab results and radiology images and interpretations and agree with findings    Results from last 7 days   Lab Units 08/23/20  0926   WBC 10*3/mm3 9.60   HEMOGLOBIN g/dL 12.5*   HEMATOCRIT % 38.1   PLATELETS 10*3/mm3 265   INR  0.95     Results from last 7 days   Lab Units 08/23/20  0926   SODIUM mmol/L 141   POTASSIUM mmol/L 4.2   CHLORIDE mmol/L 103   CO2 mmol/L 28.0   BUN  34*   CREATININE mg/dL 1.50*   GLUCOSE mg/dL 257*   CALCIUM mg/dL 9.4   TROPONIN T ng/mL 0.033*     Estimated Creatinine Clearance: 61.9 mL/min (A) (by C-G formula based on SCr of 1.5 mg/dL  (H)).  Brief Urine Lab Results     None          Microbiology Results (last 10 days)     ** No results found for the last 240 hours. **          ECG/EMG Results (most recent)     Procedure Component Value Units Date/Time    ECG 12 Lead [[id number]] Collected:  08/23/20 0930     Updated:  08/23/20 0933    Narrative:       HEART RATE= 65  bpm  RR Interval= 920  ms  MD Interval= 276  ms  P Horizontal Axis= -80  deg  P Front Axis= 15  deg  QRSD Interval= 111  ms  QT Interval= 434  ms  QRS Axis= 60  deg  T Wave Axis= 231  deg  - ABNORMAL ECG -  Sinus rhythm  Prolonged MD interval  When compared with ECG of 29-Jan-2019 9:48:10,  Significant change in rhythm  New conduction abnormality  Electronically Signed By:   Date and Time of Study: 2020-08-23 09:30:54                    Ct Head Without Contrast    Result Date: 8/23/2020   1. Left frontal scalp laceration near the vertex. No acute intracranial abnormality. 2. 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 Mild to moderate amount of chronic small vessel ischemic disease.  Electronically Signed By-Leonard Wilcox DO. On:8/23/2020 10:49 AM This report was finalized on [id number] by  Leonard Wilcox DO..    Ct Thoracic Spine Without Contrast    Result Date: 8/23/2020  Subtle horizontal fracture line through the superior portion of the T7 vertebral body and in disruption of ossification of the T6-7 disc space level. This is superimposed on extensive bone demineralization and evidence of ankylosing spondylitis. No other acute bony abnormality is demonstrated. No central canal stenosis or neural foraminal narrowing or paraspinal fluid collection. MRI may be helpful to evaluate for any other subtle fractures in this particular patient.  Electronically Signed By-Leonard Wilcox DO. On:8/23/2020 11:03 AM This report was finalized on [id number] by  Leonard Wilcox DO..    Xr Chest 1 View    Result Date: 8/23/2020  Stable cardiomegaly with evidence of  previous CABG. The overall appearance of the chest suggests interstitial edema. No conventional radiographic signs of acute chest trauma.  Electronically Signed By-Leonard Wilcox DO. On:8/23/2020 12:29 PM This report was finalized on [id number] by  Leonard Wilcox DO..        Estimated Creatinine Clearance: 61.9 mL/min (A) (by C-G formula based on SCr of 1.5 mg/dL (H)).    Assessment/Plan   Assessment/Plan       Active Hospital Problems    Diagnosis  POA   • Fall [W19.XXXA]  Yes   • Elevated troponin [R79.89]  Unknown   • Closed fracture of seventh thoracic vertebra (CMS/HCC) [S22.069A]  Unknown   • Diabetes mellitus due to underlying condition without complication, without long-term current use of insulin (CMS/HCC) [E08.9]  Yes   • Essential hypertension [I10]  Yes   • Dyslipidemia [E78.5]  Yes      Resolved Hospital Problems   No resolved problems to display.     #Fall  Fall precautions  PT/OT evaluation if okay with neurosurgery   Pain meds as needed  Wound care for elbow and scalp abrasion    #Thoracic spine fracture  NS evaluation  Pain control as needed  PT/OT if okay with NS      CT thoracic spine    Subtle horizontal fracture line through the superior portion of the T7  vertebral body and in disruption of ossification of the T6-7 disc space  level. This is superimposed on extensive bone demineralization and  evidence of ankylosing spondylitis. No other acute bony abnormality is  demonstrated. No central canal stenosis or neural foraminal narrowing or  paraspinal fluid collection. MRI may be helpful to evaluate for any  other subtle fractures in this particular patient.        #Elevated troponin-most likely 2/2 renal insufficiency  Patient chest pain-free  Telemetry  Trend troponins  Cardiac evaluation if troponins remain elevated    #CKD stage III  IV fluids  Avoid nephrotoxic agents  Telemetry  Renal eval if no improvement with above    #T2DM  Resume home diabetic medicines  Hold nephrotoxic agents  IV  fluids  Insulin coverage as needed  Consistent carbohydrate diet  Hypoglycemia protocol    #Hypertension-essential  Resume home hypertensive medicines  Telemetry  Blood pressure meds PRN    VTE Prophylaxis -   Mechanical Order History:      Ordered        08/23/20 1222  Place Sequential Compression Device  Once         08/23/20 1222  Maintain Sequential Compression Device  Continuous         08/23/20 1222  Place Venous Foot Pump  Once         08/23/20 1222  Maintain Venous Foot Pump  Once                 Pharmalogical Order History:     None          CODE STATUS:    Code Status and Medical Interventions:   Ordered at: 08/23/20 1222     Level Of Support Discussed With:    Patient     Code Status:    CPR     Medical Interventions (Level of Support Prior to Arrest):    Full       This patient has been examined wearing appropriate Personal Protective Equipment and discussed with hospital infection control department. 08/23/20      I discussed the patient's findings and my recommendations with patient.        Electronically signed by Laurie Morris MD, 08/23/20, 1:52 PM.  [facility] Hospitalist Team

## 2020-08-24 ENCOUNTER — APPOINTMENT (OUTPATIENT)
Dept: GENERAL RADIOLOGY | Facility: HOSPITAL | Age: 70
End: 2020-08-24

## 2020-08-24 VITALS
DIASTOLIC BLOOD PRESSURE: 69 MMHG | HEIGHT: 70 IN | HEART RATE: 54 BPM | TEMPERATURE: 97.5 F | OXYGEN SATURATION: 97 % | WEIGHT: 282.19 LBS | RESPIRATION RATE: 18 BRPM | SYSTOLIC BLOOD PRESSURE: 159 MMHG | BODY MASS INDEX: 40.4 KG/M2

## 2020-08-24 PROBLEM — R77.8 ELEVATED TROPONIN: Status: RESOLVED | Noted: 2020-08-23 | Resolved: 2020-08-24

## 2020-08-24 PROBLEM — R79.89 ELEVATED TROPONIN: Status: RESOLVED | Noted: 2020-08-23 | Resolved: 2020-08-24

## 2020-08-24 LAB
GLUCOSE BLDC GLUCOMTR-MCNC: 111 MG/DL (ref 70–105)
GLUCOSE BLDC GLUCOMTR-MCNC: 129 MG/DL (ref 70–105)
GLUCOSE BLDC GLUCOMTR-MCNC: 199 MG/DL (ref 70–105)
TROPONIN T SERPL-MCNC: 0.03 NG/ML (ref 0–0.03)
TROPONIN T SERPL-MCNC: 0.04 NG/ML (ref 0–0.03)

## 2020-08-24 PROCEDURE — 94799 UNLISTED PULMONARY SVC/PX: CPT

## 2020-08-24 PROCEDURE — 99217 PR OBSERVATION CARE DISCHARGE MANAGEMENT: CPT | Performed by: INTERNAL MEDICINE

## 2020-08-24 PROCEDURE — 96361 HYDRATE IV INFUSION ADD-ON: CPT

## 2020-08-24 PROCEDURE — 63710000001 INSULIN ISOPHANE & REGULAR PER 5 UNITS: Performed by: INTERNAL MEDICINE

## 2020-08-24 PROCEDURE — 82962 GLUCOSE BLOOD TEST: CPT

## 2020-08-24 PROCEDURE — G0378 HOSPITAL OBSERVATION PER HR: HCPCS

## 2020-08-24 PROCEDURE — 72070 X-RAY EXAM THORAC SPINE 2VWS: CPT

## 2020-08-24 PROCEDURE — 84484 ASSAY OF TROPONIN QUANT: CPT | Performed by: INTERNAL MEDICINE

## 2020-08-24 RX ORDER — OXYCODONE HYDROCHLORIDE AND ACETAMINOPHEN 5; 325 MG/1; MG/1
1 TABLET ORAL EVERY 6 HOURS PRN
Qty: 8 TABLET | Refills: 0 | Status: SHIPPED | OUTPATIENT
Start: 2020-08-24 | End: 2020-11-16

## 2020-08-24 RX ORDER — HYDROCODONE BITARTRATE AND ACETAMINOPHEN 10; 325 MG/1; MG/1
1 TABLET ORAL EVERY 6 HOURS PRN
Status: DISCONTINUED | OUTPATIENT
Start: 2020-08-24 | End: 2020-08-24 | Stop reason: HOSPADM

## 2020-08-24 RX ORDER — METHOCARBAMOL 750 MG/1
750 TABLET, FILM COATED ORAL EVERY 8 HOURS PRN
Status: DISCONTINUED | OUTPATIENT
Start: 2020-08-24 | End: 2020-08-24 | Stop reason: HOSPADM

## 2020-08-24 RX ORDER — METHOCARBAMOL 750 MG/1
750 TABLET, FILM COATED ORAL EVERY 8 HOURS PRN
Qty: 12 TABLET | Refills: 0 | Status: SHIPPED | OUTPATIENT
Start: 2020-08-24 | End: 2020-11-16

## 2020-08-24 RX ADMIN — DULOXETINE HYDROCHLORIDE 40 MG: 20 CAPSULE, DELAYED RELEASE ORAL at 08:44

## 2020-08-24 RX ADMIN — INSULIN HUMAN 50 UNITS: 100 INJECTION, SUSPENSION SUBCUTANEOUS at 08:43

## 2020-08-24 RX ADMIN — GABAPENTIN 600 MG: 300 CAPSULE ORAL at 13:33

## 2020-08-24 RX ADMIN — BUMETANIDE 2 MG: 1 TABLET ORAL at 08:44

## 2020-08-24 RX ADMIN — GABAPENTIN 600 MG: 300 CAPSULE ORAL at 04:42

## 2020-08-24 RX ADMIN — IPRATROPIUM BROMIDE 0.5 MG: 0.5 SOLUTION RESPIRATORY (INHALATION) at 15:41

## 2020-08-24 RX ADMIN — HYDROCODONE BITARTRATE AND ACETAMINOPHEN 1 TABLET: 5; 325 TABLET ORAL at 08:43

## 2020-08-24 RX ADMIN — HYDROCODONE BITARTRATE AND ACETAMINOPHEN 1 TABLET: 5; 325 TABLET ORAL at 04:43

## 2020-08-24 RX ADMIN — DIGOXIN 125 MCG: 0.12 TABLET ORAL at 13:33

## 2020-08-24 RX ADMIN — SODIUM CHLORIDE 100 ML/HR: 900 INJECTION, SOLUTION INTRAVENOUS at 06:44

## 2020-08-24 RX ADMIN — THERA TABS 1 TABLET: TAB at 08:44

## 2020-08-24 RX ADMIN — ATENOLOL 100 MG: 50 TABLET ORAL at 08:44

## 2020-08-24 RX ADMIN — NICOTINE 1 PATCH: 21 PATCH TRANSDERMAL at 13:34

## 2020-08-24 RX ADMIN — METFORMIN HYDROCHLORIDE 500 MG: 500 TABLET ORAL at 08:44

## 2020-08-24 RX ADMIN — ENALAPRIL MALEATE 5 MG: 5 TABLET ORAL at 08:43

## 2020-08-24 RX ADMIN — ISOSORBIDE MONONITRATE 60 MG: 60 TABLET, EXTENDED RELEASE ORAL at 08:44

## 2020-08-24 RX ADMIN — ATORVASTATIN CALCIUM 40 MG: 40 TABLET, FILM COATED ORAL at 08:44

## 2020-08-24 RX ADMIN — IPRATROPIUM BROMIDE 0.5 MG: 0.5 SOLUTION RESPIRATORY (INHALATION) at 11:42

## 2020-08-24 RX ADMIN — IPRATROPIUM BROMIDE 0.5 MG: 0.5 SOLUTION RESPIRATORY (INHALATION) at 08:02

## 2020-08-24 RX ADMIN — Medication 10 ML: at 08:47

## 2020-08-24 NOTE — PROGRESS NOTES
Continued Stay Note   [facility]     Patient Name: Benson Mclean  MRN: [id number]  Today's Date: 8/24/2020    Admit Date: 8/23/2020    Discharge Plan     Row Name 08/24/20 4839       Plan    Plan  tlso brace from [facility]. PT and ot are pending. Wife states she feels he is not safe to be home alone and needs rehab    Row Name 08/24/20 4440       Plan    Plan  TLSO brace from [facility] per MD order. Needs case managed.     Plan Comments  Call to patients room and he states to call spouse. call x2 to spouse with no answer.           Expected Discharge Date and Time     Expected Discharge Date Expected Discharge Time    Aug 25, 2020             Karol Hays RN

## 2020-08-24 NOTE — NURSING NOTE
Pt seen today for left elbow skin tear.     Pt has small partial thickness skin tear to left elbow. Measuring approx 1x1x0.1cm wound bed is pink/moist/clean. Scant serosang drainage noted. Some periwound ecchymosis/brusing noted.     Silicone foam dressing darted and placed over area. Recommend change q 3 days.

## 2020-08-24 NOTE — PROGRESS NOTES
Continued Stay Note  DOREEN Amin     Patient Name: Benson Mclean  MRN: [id number]  Today's Date: 8/24/2020    Admit Date: 8/23/2020    Discharge Plan     Row Name 08/24/20 1337       Plan    Plan  TLSO brace from [facility] per MD order. Needs case managed.     Plan Comments  Call to patients room and he states to call spouse. call x2 to spouse with no answer.           Expected Discharge Date and Time     Expected Discharge Date Expected Discharge Time    Aug 25, 2020             Karol Hays RN

## 2020-08-24 NOTE — DISCHARGE PLACEMENT REQUEST
"Benson Tobin (69 y.o. Male)     Date of Birth Social Security Number Address Home Phone MRN    1950  [address] [address] [phone] [id number]    Christianity Marital Status          None        Admission Date Admission Type Admitting Provider Attending Provider Department, Room/Bed    8/23/20 Emergency Jose Guadalupe Corea, Jose Guadalupe Salcedo DO [facility] SURGICAL INPATIENT, 4122/1    Discharge Date Discharge Disposition Discharge Destination                       Attending Provider:  Jose Guadalupe Corea DO    Allergies:  Codeine    Isolation:  None   Infection:  None   Code Status:  CPR    Ht:  177.8 cm (70\")   Wt:  128 kg (282 lb 3 oz)    Admission Cmt:  None   Principal Problem:  None                Active Insurance as of 8/23/2020     Primary Coverage     Payor Plan Insurance Group Employer/Plan Group    HUMANA MEDICARE REPLACEMENT HU[employer]NA MEDICARE REPLACEMENT [id number]     Payor Plan Address Payor Plan Phone Number Payor Plan Fax Number Effective Dates    [address] [address] [phone]  1/1/2018 - None Entered    [facility] [id number]       Subscriber Name Subscriber Birth Date Member ID       BENSON TOBIN 1950 [id number]                 Emergency Contacts      (Rel.) Home Phone Work Phone Mobile Phone    Melanie Tobin (Spouse) -- -- [phone]              "

## 2020-08-24 NOTE — PLAN OF CARE
Ambulate with supervision, assist x1 to get oob or out of chair. Home cpap during night. Norco prn pain. Uses call light appropriately. Will continue to monitor.

## 2020-08-24 NOTE — CONSULTS
Referring Provider:  AMANDA Clements  Reason for Consultation: T6-7 fracture    Patient Care Team:  Samantha Zabala APRN as PCP - General  Samantha Zabala APRN as PCP - Family Medicine  Jose G Rm MD as Consulting Physician (Cardiology)    Chief complaint back pain    Subjective .     History of present illness: Mr. Mclean is a 69-year-old gentleman who was attempting to feed his dogs yesterday when he tripped and fell.  He started experiencing mid scapular pain at that time.  He states the pain was quite intense.  The pain is in the midline and radiates to the left side of the rib cage.  It does hurt to breathe deep.  Any rolling or trying to sit intensifies the pain to approximately 8 out of 10.  He states when he is laying completely still he really has no pain.  He is obese and has a past medical history significant COPD and he still a current smoker of a pack and a half a day.  He has known diabetes but does not know what his last hemoglobin A1c is.  A CT scan of the thoracic spine was performed demonstrating a transverse fracture at its.  Portion 7 to the inferior portion T6 endplate which does not tend to extend into the posterior elements.  He denies any new weakness, urinary urgency or incontinence or radicular pain.    Review of Systems  Pertinent items are noted in HPI    History  Past Medical History:   Diagnosis Date   • Appetite loss    • Carpal tunnel syndrome on left     carpal tunnel on left hand   • Diabetic neuropathy (CMS/HCC)    • DM2 (diabetes mellitus, type 2) (CMS/HCC)    • History of angina    • Hyperlipidemia    • Hypogonadism in male    • Obesity    • Sleep apnea    • Unsteady gait    ,   Past Surgical History:   Procedure Laterality Date   • ANGIOPLASTY      X2   • APPENDECTOMY     • CARDIAC CATHETERIZATION  11/13/2015   • CARPAL TUNNEL RELEASE Left 04/29/2018    carpal tunnel- lt hand// other hand surgeries    • CATARACT EXTRACTION, BILATERAL  2002    Dr. Lux Acosta   •  CHOLECYSTECTOMY     • COLON RESECTION  2005   • CORONARY ANGIOPLASTY     • CORONARY ANGIOPLASTY WITH STENT PLACEMENT  11/13/2015    PTCA stent to proximal in stent and mid to distal lad   • CORONARY ANGIOPLASTY WITH STENT PLACEMENT  09/16/2016    PTCA stent to mid lad and stent to vein graft to marginal   • CORONARY ARTERY BYPASS GRAFT  2005    @ [facility]   • CYST REMOVAL      cyst removed from scrotum   • FOOT SURGERY Right 07/17/2018   • FOOT SURGERY Left 02/04/2019   • TOE AMPUTATION Right     right toe removed D/T infected cut that went to the bone   , No family history on file.,   Social History     Tobacco Use   • Smoking status: Current Every Day Smoker     Packs/day: 1.00     Types: Cigarettes   • Smokeless tobacco: Never Used   Substance Use Topics   • Alcohol use: No     Frequency: Never   • Drug use: Yes     Frequency: 1.0 times per week     Types: Marijuana     Comment: socially   ,   Medications Prior to Admission   Medication Sig Dispense Refill Last Dose   • aspirin 325 MG tablet Take 325 mg by mouth Daily.   8/22/2020 at Unknown time   • atenolol (TENORMIN) 100 MG tablet Take 100 mg by mouth Daily.   8/22/2020 at Unknown time   • atorvastatin (LIPITOR) 40 MG tablet Take 40 mg by mouth Daily.   8/22/2020 at Unknown time   • bumetanide (BUMEX) 2 MG tablet Take 2 mg by mouth 2 (Two) Times a Day.   8/22/2020 at Unknown time   • clopidogrel (PLAVIX) 75 MG tablet Take 75 mg by mouth Daily.   8/22/2020 at Unknown time   • digoxin (LANOXIN) 125 MCG tablet Take 125 mcg by mouth Daily.   8/22/2020 at Unknown time   • DULoxetine HCl 40 MG capsule delayed-release particles Take 40 mg by mouth Daily.   8/22/2020 at Unknown time   • enalapril (VASOTEC) 5 MG tablet Take 5 mg by mouth 2 (Two) Times a Day.   8/22/2020 at Unknown time   • gabapentin (NEURONTIN) 600 MG tablet Take 600 mg by mouth 3 (Three) Times a Day.   8/22/2020 at Unknown time   • HYDROcodone-acetaminophen (NORCO) 5-325 MG per tablet Take 1 tablet by  mouth Every 12 (Twelve) Hours As Needed.   Taking   • insulin NPH-insulin regular (humuLIN 70/30,novoLIN 70/30) (70-30) 100 UNIT/ML injection Inject 50 Units under the skin into the appropriate area as directed 2 (Two) Times a Day With Meals.   8/22/2020 at Unknown time   • isosorbide mononitrate (IMDUR) 60 MG 24 hr tablet Take 60 mg by mouth 2 (Two) Times a Day.   8/22/2020 at Unknown time   • metFORMIN (GLUCOPHAGE) 500 MG tablet Take 500 mg by mouth 2 (Two) Times a Day With Meals.   8/22/2020 at Unknown time   • Multiple Vitamins-Minerals (MULTIVITAMIN WITH MINERALS) tablet tablet Take 1 tablet by mouth Daily.   8/22/2020 at Unknown time   • tiotropium (Spiriva HandiHaler) 18 MCG per inhalation capsule Place 1 capsule into inhaler and inhale Daily. 30 capsule 1 8/22/2020 at Unknown time   , Scheduled Meds:    atenolol 100 mg Oral Daily   atorvastatin 40 mg Oral Daily   bumetanide 2 mg Oral BID   digoxin 125 mcg Oral Daily   DULoxetine 40 mg Oral Daily   enalapril 5 mg Oral BID   gabapentin 600 mg Oral Q8H   insulin lispro 0-7 Units Subcutaneous TID AC   insulin NPH-insulin regular 50 Units Subcutaneous BID With Meals   ipratropium 0.5 mg Nebulization 4x Daily - RT   isosorbide mononitrate 60 mg Oral BID - Nitrates   metFORMIN 500 mg Oral BID With Meals   nicotine 1 patch Transdermal Q24H   sodium chloride 10 mL Intravenous Q12H   Thera 1 tablet Oral Daily   , Continuous Infusions:    sodium chloride 100 mL/hr Last Rate: 100 mL/hr (08/24/20 0644)   , PRN Meds:  •  acetaminophen **OR** acetaminophen **OR** acetaminophen  •  bisacodyl  •  Calcium Gluconate-NaCl **AND** calcium gluconate **AND** Calcium, Ionized  •  dextrose  •  dextrose  •  docusate sodium  •  glucagon (human recombinant)  •  hydrALAZINE  •  HYDROcodone-acetaminophen  •  insulin lispro **AND** insulin lispro  •  magnesium sulfate **OR** magnesium sulfate **OR** magnesium sulfate  •  melatonin  •  nitroglycerin  •  ondansetron  •  potassium & sodium  phosphates **OR** potassium & sodium phosphates  •  potassium chloride **OR** potassium chloride **OR** potassium chloride  •  [COMPLETED] Insert peripheral IV **AND** sodium chloride  •  sodium chloride and Allergies:  Codeine    Objective     Vital Signs   Temp:  [97 °F (36.1 °C)-98.6 °F (37 °C)] 97.6 °F (36.4 °C)  Heart Rate:  [52-75] 58  Resp:  [11-22] 16  BP: (135-198)/(50-87) 149/75    Physical Exam:  Obese, no apparent distress   alert and oriented by 3  Speech is intact and coherent articulate with good content and production  Cranial nerves III through XII are grossly intact with pupils symmetric and reactive and no gaze paresis or nystagmus  Sensation is intact to soft touch and pinprick  in both upper and lower extremities  Motor strength is 5/5 in both upper and lower extremities with no focal motor deficits  Reflexes are absent in both upper and lower extremities with no upper motor neuron signs  No dysmetria or dysdiadochokinesia  Extremities normal and symmetric with 2+ distal pulses and no evidence of DVT  Tenderness to palpation on the axial thoracic spine approximately T6-7 level with tenderness to palpation along the posterior rib cage  No palpable deformity    Results Review:   I reviewed the patient's new clinical results.    DATE OF EXAM:  8/23/2020 10:19 AM     PROCEDURE:  CT THORACIC SPINE WO CONTRAST-      INDICATIONS:   T/L-spine trauma, minor-mod, low back pain     COMPARISON:   No Comparisons Available     TECHNIQUE:  Routine transaxial slices were obtained through the thoracic spine  without the administration of intravenous contrast. Reconstructed  coronal and sagittal images were also obtained. Automated exposure  control and iterative construction methods were used.     FINDINGS:  There is bone demineralization. There are flowing enthesophytes  throughout the thoracic spine with relative preservation of the disc  spaces, compatible with ankylosing spondylitis. There is  subtle  disruption T6-7 disc space in the ossification with a subtle fracture  line through the superior portion of the T7 vertebral body. No posterior  buckling. Best seen on the sagittal reconstructions. No paraspinal mass  or hemorrhage is identified. The vertebral body heights are maintained  alignment is preserved. Posterior elements appear intact. No suspicious  focal osseous lesions. No focal disc protrusions or extrusions. No  central canal stenosis or definitive neural foraminal narrowing is  demonstrated.     IMPRESSION:  Subtle horizontal fracture line through the superior portion of the T7  vertebral body and in disruption of ossification of the T6-7 disc space  level. This is superimposed on extensive bone demineralization and  evidence of ankylosing spondylitis. No other acute bony abnormality is  demonstrated. No central canal stenosis or neural foraminal narrowing or  paraspinal fluid collection. MRI may be helpful to evaluate for any  other subtle fractures in this particular patient.     Electronically Signed By-Leonard Wilcox DO. On:8/23/2020 11:03 AM  This report was finalized on [id number] by  Leonard Wilcox DO..  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  He does appear to have a fracture through the endplate T7 and T6 but I do not see where extends to the posterior elements.  There is no listhesis or evidence of instability.  He has no focal deficit.  At this time with his other medical comorbidities I feel he would be most likely a better candidate for bracing trial.  Today we will plan to place him in a TLSO brace and obtain standing films.  If his pain is not adequate control we can at that time did discuss  supplementing this with cement augmentation at this level to help eventually semi-stabilize it where bracing can be more effective.  At this time I do not feel that instrumented arthrodesis is warranted.  I have contacted the bracing rep who should be here later today.    I discussed the patients findings and my recommendations with patient    Rahul Haddad MD  08/24/20  08:17

## 2020-08-24 NOTE — DISCHARGE SUMMARY
"  Date of Admission: 8/23/2020    Date of Discharge:  8/24/2020    Length of stay:  LOS: 0 days     Presenting Problem:   Elevated troponin [R79.89]  Fall, initial encounter [W19.XXXA]  Closed fracture of seventh thoracic vertebra, unspecified fracture morphology, initial encounter (CMS/[facility]) [S22.069A]      Principal and Active Diagnosis During Hospital Stay:     Active Hospital Problems    Diagnosis  POA   • Fall [W19.XXXA]  Yes   • Closed fracture of seventh thoracic vertebra (CMS/[facility]) [S22.069A]  Yes   • Diabetes mellitus due to underlying condition without complication, without long-term current use of insulin (CMS/[facility]) [E08.9]  Yes   • Essential hypertension [I10]  Yes   • Dyslipidemia [E78.5]  Yes      Resolved Hospital Problems    Diagnosis Date Resolved POA   • Elevated troponin [R79.89] 08/24/2020 Yes     #Fall  Fall precautions  PT/OT evaluation if okay with neurosurgery   Pain meds as needed     #Thoracic spine fracture  NS evaluated and felt  Not need for surgery  Plain film 8/24/20 standing after brace placement showed :\"The nondisplaced fracture involving the anterior T6-7 osteophyte andthe superior T7 endplate, seen on yesterday's CT examination, is notvisible on the current plain film exam\"  -NSGY cleared for d/c as patient was ambulating without issue in the TLSO brace     CT thoracic spine    Subtle horizontal fracture line through the superior portion of the T7  vertebral body and in disruption of ossification of the T6-7 disc space  level. This is superimposed on extensive bone demineralization and  evidence of ankylosing spondylitis. No other acute bony abnormality is  demonstrated. No central canal stenosis or neural foraminal narrowing or  paraspinal fluid collection. MRI may be helpful to evaluate for any  other subtle fractures in this particular patient.           #Elevated troponin-most likely 2/2 renal insufficiency  Patient chest pain-free  Troponin downtrended      #CKD stage III  Cr at " baseline at d/c     #T2DM  Resume home diabetic medicines     #Hypertension-essential  Resume home hypertensive medicines  Telemetry  Blood pressure meds PRN      Hospital Course  Patient is a 69 y.o. male presented with follow-up at 1 suspicious of a T7 and T6 fracture.  He was evaluated by neurosurgery who felt there was no need for surgery.  He was placed in a TSL O brace and per the nurse was ambulating around the unit without issue and without pain.  He will be sent home on pain control.  Neurosurgery cleared for discharge.  The patient would have refused rehab if it was offered per him however he was ambulating per the nurse well in the TLSO brace. Felt stable for d/c.        Procedures Performed:none as noted aboe         Consults:   Consults     Date and Time Order Name Status Description    8/23/2020 1129 Neurosurgery (on-call MD unless specified) Completed     8/23/2020 1113 Hospitalist (on-call MD unless specified) Completed           Pertinent Test Results:     Lab Results (last 72 hours)     Procedure Component Value Units Date/Time    POC Glucose Once [[id number]]  (Abnormal) Collected:  08/24/20 1138    Specimen:  Blood Updated:  08/24/20 1152     Glucose 129 mg/dL      Comment: Serial Number: [id number]:  [id number]       POC Glucose Once [[id number]]  (Abnormal) Collected:  08/24/20 0757    Specimen:  Blood Updated:  08/24/20 0800     Glucose 111 mg/dL      Comment: Serial Number: [id number]:  [id number]       Troponin [[id number]]  (Abnormal) Collected:  08/24/20 0336    Specimen:  Blood Updated:  08/24/20 0435     Troponin T 0.033 ng/mL     Narrative:       Troponin T Reference Range:  <= 0.03 ng/mL-   Negative for AMI  >0.03 ng/mL-     Abnormal for myocardial necrosis.  Clinicians would have to utilize clinical acumen, EKG, Troponin and serial changes to determine if it is an Acute Myocardial Infarction or myocardial injury due to an underlying chronic condition.       Results may be  falsely decreased if patient taking Biotin.      Troponin [[id number]]  (Abnormal) Collected:  08/24/20 0106    Specimen:  Blood Updated:  08/24/20 0204     Troponin T 0.042 ng/mL     Narrative:       Troponin T Reference Range:  <= 0.03 ng/mL-   Negative for AMI  >0.03 ng/mL-     Abnormal for myocardial necrosis.  Clinicians would have to utilize clinical acumen, EKG, Troponin and serial changes to determine if it is an Acute Myocardial Infarction or myocardial injury due to an underlying chronic condition.       Results may be falsely decreased if patient taking Biotin.      Troponin [[id number]]  (Normal) Collected:  08/23/20 2125    Specimen:  Blood Updated:  08/23/20 2156     Troponin T 0.026 ng/mL     Narrative:       Troponin T Reference Range:  <= 0.03 ng/mL-   Negative for AMI  >0.03 ng/mL-     Abnormal for myocardial necrosis.  Clinicians would have to utilize clinical acumen, EKG, Troponin and serial changes to determine if it is an Acute Myocardial Infarction or myocardial injury due to an underlying chronic condition.       Results may be falsely decreased if patient taking Biotin.      POC Glucose Once [[id number]]  (Abnormal) Collected:  08/23/20 2143    Specimen:  Blood Updated:  08/23/20 2145     Glucose 229 mg/dL      Comment: Serial Number: [id number]:  [id number]       Troponin [[id number]]  (Normal) Collected:  08/23/20 1548    Specimen:  Blood Updated:  08/23/20 1637     Troponin T 0.030 ng/mL     Narrative:       Troponin T Reference Range:  <= 0.03 ng/mL-   Negative for AMI  >0.03 ng/mL-     Abnormal for myocardial necrosis.  Clinicians would have to utilize clinical acumen, EKG, Troponin and serial changes to determine if it is an Acute Myocardial Infarction or myocardial injury due to an underlying chronic condition.       Results may be falsely decreased if patient taking Biotin.      POC Glucose Once [[id number]]  (Abnormal) Collected:  08/23/20 1613    Specimen:  Blood Updated:   08/23/20 1615     Glucose 259 mg/dL      Comment: Serial Number: [id number]:  [id number]       Troponin [[id number]]  (Abnormal) Collected:  08/23/20 1354    Specimen:  Blood Updated:  08/23/20 1440     Troponin T 0.031 ng/mL     Narrative:       Troponin T Reference Range:  <= 0.03 ng/mL-   Negative for AMI  >0.03 ng/mL-     Abnormal for myocardial necrosis.  Clinicians would have to utilize clinical acumen, EKG, Troponin and serial changes to determine if it is an Acute Myocardial Infarction or myocardial injury due to an underlying chronic condition.       Results may be falsely decreased if patient taking Biotin.      POC Glucose Once [[id number]]  (Abnormal) Collected:  08/23/20 1342    Specimen:  Blood Updated:  08/23/20 1343     Glucose 255 mg/dL      Comment: Serial Number: [id number]:  [id number]       Extra Tubes [[id number]] Collected:  08/23/20 0926    Specimen:  Blood, Venous Line Updated:  08/23/20 1031    Narrative:       The following orders were created for panel order Extra Tubes.  Procedure                               Abnormality         Status                     ---------                               -----------         ------                     Gold Top - SST[[id number]]                                   Final result                 Please view results for these tests on the individual orders.    Gold Top - SST [[id number]] Collected:  08/23/20 0926    Specimen:  Blood Updated:  08/23/20 1031     Extra Tube Hold for add-ons.     Comment: Auto resulted.       BUN [[id number]]  (Abnormal) Collected:  08/23/20 0926    Specimen:  Blood Updated:  08/23/20 1012     BUN 34 mg/dL     Troponin [[id number]]  (Abnormal) Collected:  08/23/20 0926    Specimen:  Blood Updated:  08/23/20 1011     Troponin T 0.033 ng/mL     Narrative:       Troponin T Reference Range:  <= 0.03 ng/mL-   Negative for AMI  >0.03 ng/mL-     Abnormal for myocardial necrosis.  Clinicians would have to utilize clinical  acumen, EKG, Troponin and serial changes to determine if it is an Acute Myocardial Infarction or myocardial injury due to an underlying chronic condition.       Results may be falsely decreased if patient taking Biotin.      Basic Metabolic Panel [[id number]]  (Abnormal) Collected:  08/23/20 0926    Specimen:  Blood Updated:  08/23/20 1004     Glucose 257 mg/dL      BUN --     Comment: Testing performed by alternate method        Creatinine 1.50 mg/dL      Sodium 141 mmol/L      Potassium 4.2 mmol/L      Chloride 103 mmol/L      CO2 28.0 mmol/L      Calcium 9.4 mg/dL      eGFR Non African Amer 46 mL/min/1.73      BUN/Creatinine Ratio --     Comment: Testing not performed        Anion Gap 10.0 mmol/L     Narrative:       GFR Normal >60  Chronic Kidney Disease <60  Kidney Failure <15      aPTT [[id number]]  (Normal) Collected:  08/23/20 0926    Specimen:  Blood Updated:  08/23/20 0955     PTT 26.4 seconds     Protime-INR [[id number]]  (Normal) Collected:  08/23/20 0926    Specimen:  Blood Updated:  08/23/20 0955     Protime 10.4 Seconds      INR 0.95    CBC & Differential [[id number]] Collected:  08/23/20 0926    Specimen:  Blood Updated:  08/23/20 0935    Narrative:       The following orders were created for panel order CBC & Differential.  Procedure                               Abnormality         Status                     ---------                               -----------         ------                     CBC Auto Differential[[id number]]        Abnormal            Final result                 Please view results for these tests on the individual orders.    CBC Auto Differential [[id number]]  (Abnormal) Collected:  08/23/20 0926    Specimen:  Blood Updated:  08/23/20 0935     WBC 9.60 10*3/mm3      RBC 4.11 10*6/mm3      Hemoglobin 12.5 g/dL      Hematocrit 38.1 %      MCV 92.6 fL      MCH 30.5 pg      MCHC 32.9 g/dL      RDW 14.8 %      RDW-SD 48.1 fl      MPV 8.3 fL      Platelets 265 10*3/mm3      Neutrophil %  71.0 %      Lymphocyte % 15.9 %      Monocyte % 8.3 %      Eosinophil % 3.9 %      Basophil % 0.9 %      Neutrophils, Absolute 6.80 10*3/mm3      Lymphocytes, Absolute 1.50 10*3/mm3      Monocytes, Absolute 0.80 10*3/mm3      Eosinophils, Absolute 0.40 10*3/mm3      Basophils, Absolute 0.10 10*3/mm3      nRBC 0.0 /100 WBC                Microbiology Results (last 10 days)     ** No results found for the last 240 hours. **                 Imaging Results (All)     Procedure Component Value Units Date/Time    XR Spine Thoracic 2 View [[id number]] Collected:  08/24/20 1456     Updated:  08/24/20 1501    Narrative:       DATE OF EXAM:  8/24/2020 2:51 PM     PROCEDURE:  XR SPINE THORACIC 2 VW-     INDICATIONS:  Fell a few days ago, now with upper to mid back pain. Patient is in a  brace.     COMPARISON:  Thoracic spine radiographs 08/23/2020.     TECHNIQUE:   Two radiologic views of the thoracic spine were obtained.     FINDINGS:  In confluent anterior osteophytes are present within the thoracic spine  consistent with the appearance of ankylosing spondylitis. The  nondisplaced fracture involving the upper T7 anterior endplate, with  fracture line extension into anterior T6-7 osteophyte formation, is not  visualized on this plain film study and is seen to better advantage on  yesterday's CT. The thoracic vertebral bodies maintain normal alignment.  Imaged paraspinal soft tissues are notable for recent sternotomy and  CABG.        Impression:          1. The nondisplaced fracture involving the anterior T6-7 osteophyte and  the superior T7 endplate, seen on yesterday's CT examination, is not  visible on the current plain film exam.  2. Features of ankylosing spondylitis.  3. Satisfactory thoracic spinal alignment.     Electronically Signed By-Dr. Demetria Jolley MD On:8/24/2020 2:59 PM  This report was finalized on [id number] by Dr. Demetria Jolley MD.    XR Chest 1 View [[id number]] Collected:  08/23/20 1227     Updated:   08/23/20 1232    Narrative:       XR CHEST 1 VW-     Date of Exam: 8/23/2020 11:20 AM     Indication: fall; W19.XXXA-Unspecified fall, initial encounter;  R79.89-Other specified abnormal findings of blood chemistry;  S22.069A-Unspecified fracture of T7-t8 vertebra, initial encounter for  closed fracture  69-year-old, current smoker, previous CABG, back pain  after fall today     Comparison: 01/24/2019, and 07/17/2018 chest radiograph     Technique: 1 view(s) of the chest were obtained.     FINDINGS: There is stable mild cardiomegaly with evidence of  previous CABG and senescent change in the thoracic aorta.  Pulmonary  vascularity is mildly prominent and there is diffuse interstitial  thickening. No focal airspace opacities. No pneumothorax or definite  effusions. Trachea is midline. Visualized bony structures are intact.       Impression:       Stable cardiomegaly with evidence of previous CABG. The overall  appearance of the chest suggests interstitial edema. No conventional  radiographic signs of acute chest trauma.     Electronically Signed By-Leonard Wilcox DO. On:8/23/2020 12:29 PM  This report was finalized on [id number] by  Leonard Wilcox DO..    CT Thoracic Spine Without Contrast [[id number]] Collected:  08/23/20 1056     Updated:  08/23/20 1105    Narrative:          DATE OF EXAM:  8/23/2020 10:19 AM     PROCEDURE:  CT THORACIC SPINE WO CONTRAST-      INDICATIONS:   T/L-spine trauma, minor-mod, low back pain     COMPARISON:   No Comparisons Available     TECHNIQUE:  Routine transaxial slices were obtained through the thoracic spine  without the administration of intravenous contrast. Reconstructed  coronal and sagittal images were also obtained. Automated exposure  control and iterative construction methods were used.     FINDINGS:  There is bone demineralization. There are flowing enthesophytes  throughout the thoracic spine with relative preservation of the disc  spaces, compatible with ankylosing  spondylitis. There is subtle  disruption T6-7 disc space in the ossification with a subtle fracture  line through the superior portion of the T7 vertebral body. No posterior  buckling. Best seen on the sagittal reconstructions. No paraspinal mass  or hemorrhage is identified. The vertebral body heights are maintained  alignment is preserved. Posterior elements appear intact. No suspicious  focal osseous lesions. No focal disc protrusions or extrusions. No  central canal stenosis or definitive neural foraminal narrowing is  demonstrated.       Impression:       Subtle horizontal fracture line through the superior portion of the T7  vertebral body and in disruption of ossification of the T6-7 disc space  level. This is superimposed on extensive bone demineralization and  evidence of ankylosing spondylitis. No other acute bony abnormality is  demonstrated. No central canal stenosis or neural foraminal narrowing or  paraspinal fluid collection. MRI may be helpful to evaluate for any  other subtle fractures in this particular patient.     Electronically Signed By-Leonard Wilcox DO. On:8/23/2020 11:03 AM  This report was finalized on [id number] by  Leonard Wilcox DO..    CT Head Without Contrast [[id number]] Collected:  08/23/20 1045     Updated:  08/23/20 1103    Narrative:          DATE OF EXAM:  8/23/2020 10:19 AM     PROCEDURE:   CT HEAD WO CONTRAST-     INDICATIONS:   Headache, post trauma     COMPARISON:  69-year-old male, fell and hit back of head today     TECHNIQUE:   Routine transaxial cuts were obtained through the head without the  administration of contrast. Automated exposure control and iterative  reconstruction methods were used. Exam was repeated since the patient  had difficulty holding still.     FINDINGS:   laceration is present in the left frontal region near the vertex.  No scalp hematoma or radiopaque foreign body is demonstrated. There is  age-related volume loss and moderate amount of  low-density in the  subcortical and periventricular white matter likely chronic small vessel  ischemia. This is very nonspecific. There is a focal area of  encephalomalacia in the posterior left parietal lobe from previous  infarct.     There is no CT evidence of acute hemorrhage, infarct, mass, mass effect,  or midline shift. No hydrocephalus. No extra-axial fluid collections.  The globes and orbital contents are normal and symmetric. The mastoid  air cells and visualized paranasal sinuses are clear. No skull fractures  or calvarial lesions.                Impression:          1. Left frontal scalp laceration near the vertex. No acute intracranial  abnormality.  2. 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 Mild  to moderate amount of chronic small vessel ischemic disease.     Electronically Signed By-Leonard Wilcox DO. On:8/23/2020 10:49 AM  This report was finalized on [id number] by  Leonard Wilcox DO..            Condition on Discharge:  stable    Vital Signs  Temp:  [97.5 °F (36.4 °C)-98.6 °F (37 °C)] 97.5 °F (36.4 °C)  Heart Rate:  [52-58] 54  Resp:  [12-20] 18  BP: (135-159)/(67-75) 159/69    Physical Exam:  Physical Exam   Constitutional: He is oriented to person, place, and time. No distress.   Cardiovascular: Normal rate and regular rhythm.   Pulmonary/Chest: Effort normal.   Abdominal: Soft. He exhibits no distension.   Musculoskeletal:   ttp lower back    Neurological: He is alert and oriented to person, place, and time.       Discharge Disposition  Home or Self Care    Discharge Medications     Discharge Medications      New Medications      Instructions Start Date   methocarbamol 750 MG tablet  Commonly known as:  ROBAXIN   750 mg, Oral, Every 8 Hours PRN      oxyCODONE-acetaminophen 5-325 MG per tablet  Commonly known as:  Percocet   1 tablet, Oral, Every 6 Hours PRN         Continue These Medications      Instructions Start Date   aspirin 325 MG tablet   325 mg, Oral,  Daily      atenolol 100 MG tablet  Commonly known as:  TENORMIN   100 mg, Oral, Daily      atorvastatin 40 MG tablet  Commonly known as:  LIPITOR   40 mg, Oral, Daily      bumetanide 2 MG tablet  Commonly known as:  BUMEX   2 mg, Oral, 2 Times Daily      clopidogrel 75 MG tablet  Commonly known as:  PLAVIX   75 mg, Oral, Daily      digoxin 125 MCG tablet  Commonly known as:  LANOXIN   125 mcg, Oral, Daily Digoxin      DULoxetine HCl 40 MG capsule delayed-release particles   40 mg, Oral, Daily      enalapril 5 MG tablet  Commonly known as:  VASOTEC   5 mg, Oral, 2 Times Daily      gabapentin 600 MG tablet  Commonly known as:  NEURONTIN   600 mg, Oral, 3 Times Daily      HYDROcodone-acetaminophen 5-325 MG per tablet  Commonly known as:  NORCO   1 tablet, Oral, Every 12 Hours PRN      insulin NPH-insulin regular (70-30) 100 UNIT/ML injection  Commonly known as:  humuLIN 70/30,novoLIN 70/30   50 Units, Subcutaneous, 2 Times Daily With Meals      isosorbide mononitrate 60 MG 24 hr tablet  Commonly known as:  IMDUR   60 mg, Oral, 2 Times Daily      metFORMIN 500 MG tablet  Commonly known as:  GLUCOPHAGE   500 mg, Oral, 2 Times Daily With Meals      multivitamin with minerals tablet tablet   1 tablet, Oral, Daily      tiotropium 18 MCG per inhalation capsule  Commonly known as:  Spiriva HandiHaler   1 capsule, Inhalation, Daily - RT             Discharge Diet:   Diet Instructions     Diet: Cardiac, Consistent Carbohydrate      Discharge Diet:   Cardiac  Consistent Carbohydrate             Activity at Discharge:   Activity Instructions     Gradually Increase Activity Until at Pre-Hospitalization Level            Follow-up Appointments  No future appointments.  Additional Instructions for the Follow-ups that You Need to Schedule     Discharge Follow-up with Specified Provider: one week with primary   As directed      To:  one week with primary               Test Results Pending at Discharge       Risk for Readmission (LACE)  Score: 2 (8/24/2020  6:00 AM)      This patient has been examined wearing appropriate Personal Protective Equipment . 08/24/20        Jose Guadalupe FRANCO [place], DO  08/24/20  16:22

## 2020-08-24 NOTE — PROGRESS NOTES
Continued Stay Note   [place]     Patient Name: Benson Mclean  MRN: [id number]  Today's Date: 8/24/2020    Admit Date: 8/23/2020    Discharge Plan     Row Name 08/24/20 1602       Plan    Plan Comments  Spoke to bedside nurse who states patient has brace and he is walking up and down the halls. Spoke to patient who states he will not go to rehab. Callplaced to inform spouse with no answer x2.     Row Name 08/24/20 8481       Plan    Plan  tlso brace from [place]. PT and ot are pending. Wife states she feels he is not safe to be home alone and needs rehab    Row Name 08/24/20 4287       Plan    Plan  TLSO brace from [facility] per MD order. Needs case managed.     Plan Comments  Call to patients room and he states to call spouse. call x2 to spouse with no answer.        Expected Discharge Date and Time     Expected Discharge Date Expected Discharge Time    Aug 24, 2020             Karol Hays RN

## 2020-08-25 NOTE — PROGRESS NOTES
Case Management Discharge Note      Final Note: home                   Final Discharge Disposition Code: 01 - home or self-care

## 2020-09-23 ENCOUNTER — OFFICE VISIT (OUTPATIENT)
Dept: NEUROSURGERY | Facility: CLINIC | Age: 70
End: 2020-09-23

## 2020-09-23 ENCOUNTER — HOSPITAL ENCOUNTER (OUTPATIENT)
Dept: GENERAL RADIOLOGY | Facility: HOSPITAL | Age: 70
Discharge: HOME | End: 2020-09-23
Admitting: NEUROLOGICAL SURGERY

## 2020-09-23 VITALS
BODY MASS INDEX: 40.37 KG/M2 | RESPIRATION RATE: 18 BRPM | HEIGHT: 70 IN | WEIGHT: 282 LBS | SYSTOLIC BLOOD PRESSURE: 169 MMHG | HEART RATE: 66 BPM | DIASTOLIC BLOOD PRESSURE: 64 MMHG

## 2020-09-23 DIAGNOSIS — S22.069A CLOSED FRACTURE OF SEVENTH THORACIC VERTEBRA, UNSPECIFIED FRACTURE MORPHOLOGY, INITIAL ENCOUNTER (HCC): Primary | ICD-10-CM

## 2020-09-23 DIAGNOSIS — S22.069A CLOSED FRACTURE OF SEVENTH THORACIC VERTEBRA, UNSPECIFIED FRACTURE MORPHOLOGY, INITIAL ENCOUNTER (HCC): ICD-10-CM

## 2020-09-23 PROCEDURE — 72070 X-RAY EXAM THORAC SPINE 2VWS: CPT

## 2020-09-23 PROCEDURE — 99213 OFFICE O/P EST LOW 20 MIN: CPT | Performed by: NEUROLOGICAL SURGERY

## 2020-09-23 RX ORDER — CHLORAL HYDRATE 500 MG
1000 CAPSULE ORAL DAILY
COMMUNITY
End: 2021-08-02 | Stop reason: HOSPADM

## 2020-09-23 NOTE — PROGRESS NOTES
"Subjective   Benson ARCELIA Mclean is a 69 y.o. male.     Chief Complaint   Patient presents with   • Follow-up     f/u from hospital      Visit Vitals  /64 (BP Location: Right arm, Patient Position: Sitting, Cuff Size: Large Adult)   Pulse 66   Resp 18   Ht 177.8 cm (70\")   Wt 128 kg (282 lb)   BMI 40.46 kg/m²       History of Present Illness: Mr Mclean is here today for follow-up.  He states he has not been wearing his brace.  He has a known T6 fracture after a fall.  He states the pain in the backs much better but he still feels intercostal pain when he coughs or sneezes.  He advised no other symptom negative for that type of pain.  He did not obtain standing x-rays today.  He has known history of advanced COPD and he still smokes.  He is a poorly controlled diabetic.    The following portions of the patient's history were reviewed and updated as appropriate: allergies, current medications, past family history, past medical history, past social history, past surgical history and problem list.    Review of Systems         Past Surgical History:   Procedure Laterality Date   • ANGIOPLASTY      X2   • APPENDECTOMY     • CARDIAC CATHETERIZATION  11/13/2015   • CARPAL TUNNEL RELEASE Left 04/29/2018    carpal tunnel- lt hand// other hand surgeries    • CATARACT EXTRACTION, BILATERAL  2002    Dr. Lux Acosta   • CHOLECYSTECTOMY     • COLON RESECTION  2005   • CORONARY ANGIOPLASTY     • CORONARY ANGIOPLASTY WITH STENT PLACEMENT  11/13/2015    PTCA stent to proximal in stent and mid to distal lad   • CORONARY ANGIOPLASTY WITH STENT PLACEMENT  09/16/2016    PTCA stent to mid lad and stent to vein graft to marginal   • CORONARY ARTERY BYPASS GRAFT  2005    @ [facility]   • CYST REMOVAL      cyst removed from scrotum   • FOOT SURGERY Right 07/17/2018   • FOOT SURGERY Left 02/04/2019   • TOE AMPUTATION Right     right toe removed D/T infected cut that went to the bone       Past Medical History:   Diagnosis Date   • Appetite loss  "   • Carpal tunnel syndrome on left     carpal tunnel on left hand   • Diabetic neuropathy (CMS/HCC)    • DM2 (diabetes mellitus, type 2) (CMS/HCC)    • History of angina    • Hyperlipidemia    • Hypogonadism in male    • Obesity    • Sleep apnea    • Unsteady gait      Social History     Socioeconomic History   • Marital status:      Spouse name: Not on file   • Number of children: Not on file   • Years of education: Not on file   • Highest education level: Not on file   Tobacco Use   • Smoking status: Current Every Day Smoker     Packs/day: 1.00     Types: Cigarettes   • Smokeless tobacco: Never Used   Substance and Sexual Activity   • Alcohol use: No     Frequency: Never   • Drug use: Yes     Frequency: 1.0 times per week     Types: Marijuana     Comment: socially   • Sexual activity: Defer      Family History   Problem Relation Age of Onset   • Heart disease Mother    • Heart disease Father    • Diabetes Sister    • Heart disease Sister    • Diabetes Brother    • Mental illness Brother           Objective   Physical Exam  Neurologic Exam  Ortho Exam      Alert and oriented by 3  Appears physiologically much older than his chronologic age with some labored breathing  Mild tender to palpation along the thoracic spine at the T6 region  Poor effort but no focal asymmetry of motor exam  Shuffling slow gait and using a four-point walker to ambulate      Assessment and Plan: At this time I told Mr. Mclean he is a very poor candidate for any surgical intervention.  He is not wearing his brace because he finds it irritating.  I have advised him he should be wearing his brace.  We will send him over for x-rays today.  We will refer him to pain management to see if they can assist with intercostal blocks but I cannot rule out that he does not have a rib fracture which is aggravating him as well.      Problems Addressed this Visit     None

## 2020-11-16 ENCOUNTER — OFFICE VISIT (OUTPATIENT)
Dept: PODIATRY | Facility: CLINIC | Age: 70
End: 2020-11-16

## 2020-11-16 VITALS
DIASTOLIC BLOOD PRESSURE: 66 MMHG | HEIGHT: 70 IN | BODY MASS INDEX: 37.94 KG/M2 | SYSTOLIC BLOOD PRESSURE: 165 MMHG | HEART RATE: 89 BPM | WEIGHT: 265 LBS

## 2020-11-16 DIAGNOSIS — E11.42 DM TYPE 2 WITH DIABETIC PERIPHERAL NEUROPATHY (HCC): Primary | ICD-10-CM

## 2020-11-16 DIAGNOSIS — B35.1 ONYCHOMYCOSIS: ICD-10-CM

## 2020-11-16 DIAGNOSIS — I87.303 CHRONIC VENOUS HYPERTENSION WITHOUT COMPLICATIONS, BILATERAL: ICD-10-CM

## 2020-11-16 PROCEDURE — 11721 DEBRIDE NAIL 6 OR MORE: CPT | Performed by: PODIATRIST

## 2020-11-16 PROCEDURE — 99213 OFFICE O/P EST LOW 20 MIN: CPT | Performed by: PODIATRIST

## 2020-11-16 RX ORDER — HYDROCODONE BITARTRATE AND ACETAMINOPHEN 10; 325 MG/1; MG/1
1 TABLET ORAL 2 TIMES DAILY PRN
Status: ON HOLD | COMMUNITY
Start: 2020-09-28 | End: 2020-12-01 | Stop reason: SDUPTHER

## 2020-11-17 NOTE — PROGRESS NOTES
11/16/2020  Foot and Ankle Surgery - Established Patient/Follow-up  Provider: Dr. Maximino Chung DPM  Location: [facility]    Subjective:  Benson Mclean is a 70 y.o. male.     Chief Complaint   Patient presents with   • Left Foot - Follow-up   • Right Foot - Follow-up       HPI: Patient returns for routine diabetic foot check.  He states that he is doing relatively well and has not noticed any open wounds or infections to his feet.  He states that his blood sugars have remained elevated but he does not recall his most recent A1c.  He has been ambulating with cane assistance but denies any falls or other issues.  Overall, he feels that his feet are doing relatively well.    Allergies   Allergen Reactions   • Codeine Itching       Current Outpatient Medications on File Prior to Visit   Medication Sig Dispense Refill   • DULoxetine (CYMBALTA) 60 MG capsule TK 1 C PO D     • HYDROcodone-acetaminophen (NORCO)  MG per tablet      • aspirin 325 MG tablet Take 325 mg by mouth Daily.     • atenolol (TENORMIN) 100 MG tablet Take 100 mg by mouth Daily.     • atorvastatin (LIPITOR) 40 MG tablet Take 40 mg by mouth Daily.     • bumetanide (BUMEX) 2 MG tablet Take 2 mg by mouth 2 (Two) Times a Day.     • clopidogrel (PLAVIX) 75 MG tablet Take 75 mg by mouth Daily.     • digoxin (LANOXIN) 125 MCG tablet Take 125 mcg by mouth Daily.     • enalapril (VASOTEC) 5 MG tablet Take 5 mg by mouth 2 (Two) Times a Day.     • gabapentin (NEURONTIN) 600 MG tablet Take 600 mg by mouth 3 (Three) Times a Day.     • insulin NPH-insulin regular (humuLIN 70/30,novoLIN 70/30) (70-30) 100 UNIT/ML injection Inject 50 Units under the skin into the appropriate area as directed 2 (Two) Times a Day With Meals.     • isosorbide mononitrate (IMDUR) 60 MG 24 hr tablet Take 60 mg by mouth 2 (Two) Times a Day.     • metFORMIN (GLUCOPHAGE) 500 MG tablet Take 500 mg by mouth 2 (Two) Times a Day With Meals.     • Omega-3 Fatty Acids (fish  "oil) 1000 MG capsule capsule Take  by mouth Daily With Breakfast.     • tiotropium (Spiriva HandiHaler) 18 MCG per inhalation capsule Place 1 capsule into inhaler and inhale Daily. 30 capsule 1     No current facility-administered medications on file prior to visit.        Objective   /66   Pulse 89   Ht 177.8 cm (70\")   Wt 120 kg (265 lb)   BMI 38.02 kg/m²     Foot/Ankle Exam:       General:   Appearance: appears stated age and healthy and obesity    Orientation: AAOx3    Affect: appropriate    Assistance: cane      VASCULAR      Right Foot Vascularity   Dorsalis pedis:  2+  Posterior tibial:  2+  Skin Temperature: warm    Edema Grading:  Pitting and 3+  CFT:  < 3 seconds  Pedal Hair Growth:  Absent     Left Foot Vascularity   Dorsalis pedis:  2+  Posterior tibial:  2+  Skin Temperature: warm    Edema Grading:  3+ and pitting  CFT:  < 3 seconds  Pedal Hair Growth:  Absent      NEUROLOGIC     Right Foot Neurologic   Light touch sensation:  Diminished  Hot/Cold sensation: diminished    Protective Sensation using Rosine-Avtar Monofilament:  Diminished  Achilles reflex:  1+     Left Foot Neurologic   Light touch sensation:  Diminished  Hot/cold sensation: diminished    Protective Sensation using Rosine-Avtar Monofilament:  Diminished  Achilles reflex:  1+     MUSCULOSKELETAL      Right Foot Musculoskeletal    Amputation   Right toes amputated: Yes    Toes amputated: fourth toe and fifth toe  Ecchymosis:  None  Tenderness: none    Hammertoe:  Third toe and second toe     Left Foot Musculoskeletal    Amputation   Left toes amputated: Yes    Toes amputated: fifth toe  Ecchymosis:  None  Tenderness: none    Hammertoe:  Second toe, third toe and fourth toe     MUSCLE STRENGTH     Right Foot Muscle Strength   Normal strength    Foot dorsiflexion:  5  Foot plantar flexion:  5  Foot inversion:  5  Foot eversion:  5     Left Foot Muscle Strength   Normal strength    Foot dorsiflexion:  5  Foot plantar flexion: "  5  Foot inversion:  5  Foot eversion:  5     DERMATOLOGIC     Right Foot Dermatologic   Skin: skin intact    Nails: onychomycosis, abnormally thick and dystrophic nails       Left Foot Dermatologic   Skin: skin intact    Nails: onychomycosis, abnormally thick and dystrophic nails        Right Foot Additional Comments Stable amputations to both feet.  No progressive deformity or instability.  No pain with palpation      Assessment/Plan   Diagnoses and all orders for this visit:    1. DM type 2 with diabetic peripheral neuropathy (CMS/HCC) (Primary)    2. Onychomycosis    3. Chronic venous hypertension without complications, bilateral      Patient returns for routine diabetic foot check.  He states that he has been doing well since last exam.  He has not had any open wounds or infections to his feet.  He is asking that his nails are debrided today as he is unable to do them.  Nails were trimmed.  Patient understands that he remains at high risk of pedal complications.  I did review the importance of continued daily foot checks and glycemic control.  I have also asked that he wear compression garments on a daily basis given that he does have fairly significant pitting edema.  There are no signs of open wounds or inflammation today.  Patient is to call with any issues or concerns.  I will see him in 4 months for reevaluation.    Nail debridement: Both feet x7    Nails were debrided with a nail nipper without complication.  No anesthesia was required.  Indications for procedure were thickened, dystrophic, and fungal appearing nails which are difficult to trim.  Proper self-care and technique was discussed with patient.  Patient was stable after procedure.      No orders of the defined types were placed in this encounter.         Note is dictated utilizing voice recognition software. Unfortunately this leads to occasional typographical errors. I apologize in advance if the situation occurs. If questions occur please do  not hesitate to call our office.

## 2020-11-27 ENCOUNTER — APPOINTMENT (OUTPATIENT)
Dept: MRI IMAGING | Facility: HOSPITAL | Age: 70
End: 2020-11-27

## 2020-11-27 ENCOUNTER — APPOINTMENT (OUTPATIENT)
Dept: CT IMAGING | Facility: HOSPITAL | Age: 70
End: 2020-11-27

## 2020-11-27 ENCOUNTER — APPOINTMENT (OUTPATIENT)
Dept: CARDIOLOGY | Facility: HOSPITAL | Age: 70
End: 2020-11-27

## 2020-11-27 ENCOUNTER — APPOINTMENT (OUTPATIENT)
Dept: GENERAL RADIOLOGY | Facility: HOSPITAL | Age: 70
End: 2020-11-27

## 2020-11-27 ENCOUNTER — HOSPITAL ENCOUNTER (INPATIENT)
Facility: HOSPITAL | Age: 70
LOS: 3 days | Discharge: NURSING, REHAB OR LONG-TERM CARE | End: 2020-12-01
Attending: EMERGENCY MEDICINE | Admitting: HOSPITALIST

## 2020-11-27 DIAGNOSIS — I63.9 CEREBROVASCULAR ACCIDENT (CVA), UNSPECIFIED MECHANISM (HCC): Primary | ICD-10-CM

## 2020-11-27 DIAGNOSIS — R51.9 NONINTRACTABLE HEADACHE, UNSPECIFIED CHRONICITY PATTERN, UNSPECIFIED HEADACHE TYPE: ICD-10-CM

## 2020-11-27 DIAGNOSIS — G44.89 OTHER HEADACHE SYNDROME: ICD-10-CM

## 2020-11-27 DIAGNOSIS — S22.069A CLOSED FRACTURE OF SEVENTH THORACIC VERTEBRA, UNSPECIFIED FRACTURE MORPHOLOGY, INITIAL ENCOUNTER (HCC): ICD-10-CM

## 2020-11-27 PROBLEM — R26.9 NEUROLOGIC GAIT DYSFUNCTION: Status: ACTIVE | Noted: 2020-11-27

## 2020-11-27 PROBLEM — R29.898 RIGHT HAND WEAKNESS: Status: ACTIVE | Noted: 2020-11-27

## 2020-11-27 LAB
ABO GROUP BLD: NORMAL
ALBUMIN SERPL-MCNC: 3.6 G/DL (ref 3.5–5.2)
ALBUMIN/GLOB SERPL: 1.2 G/DL
ALP SERPL-CCNC: 57 U/L (ref 39–117)
ALT SERPL W P-5'-P-CCNC: 12 U/L (ref 1–41)
ANION GAP SERPL CALCULATED.3IONS-SCNC: 12 MMOL/L (ref 5–15)
APTT PPP: 24.2 SECONDS (ref 24–31)
AST SERPL-CCNC: 11 U/L (ref 1–40)
BASOPHILS # BLD AUTO: 0.1 10*3/MM3 (ref 0–0.2)
BASOPHILS NFR BLD AUTO: 1 % (ref 0–1.5)
BILIRUB SERPL-MCNC: 0.6 MG/DL (ref 0–1.2)
BLD GP AB SCN SERPL QL: NEGATIVE
BUN SERPL-MCNC: 38 MG/DL (ref 8–23)
BUN/CREAT SERPL: 23.3 (ref 7–25)
CALCIUM SPEC-SCNC: 8.9 MG/DL (ref 8.6–10.5)
CHLORIDE SERPL-SCNC: 103 MMOL/L (ref 98–107)
CHOLEST SERPL-MCNC: 165 MG/DL (ref 0–200)
CO2 SERPL-SCNC: 23 MMOL/L (ref 22–29)
CREAT SERPL-MCNC: 1.63 MG/DL (ref 0.76–1.27)
DEPRECATED RDW RBC AUTO: 47.3 FL (ref 37–54)
DIGOXIN SERPL-MCNC: 0.7 NG/ML (ref 0.6–1.2)
EOSINOPHIL # BLD AUTO: 0.3 10*3/MM3 (ref 0–0.4)
EOSINOPHIL NFR BLD AUTO: 3.9 % (ref 0.3–6.2)
ERYTHROCYTE [DISTWIDTH] IN BLOOD BY AUTOMATED COUNT: 14.5 % (ref 12.3–15.4)
FOLATE SERPL-MCNC: 8.94 NG/ML (ref 4.78–24.2)
GFR SERPL CREATININE-BSD FRML MDRD: 42 ML/MIN/1.73
GLOBULIN UR ELPH-MCNC: 3.1 GM/DL
GLUCOSE BLDC GLUCOMTR-MCNC: 161 MG/DL (ref 70–105)
GLUCOSE BLDC GLUCOMTR-MCNC: 199 MG/DL (ref 70–105)
GLUCOSE BLDC GLUCOMTR-MCNC: 221 MG/DL (ref 70–105)
GLUCOSE BLDC GLUCOMTR-MCNC: 221 MG/DL (ref 70–105)
GLUCOSE BLDC GLUCOMTR-MCNC: 241 MG/DL (ref 70–105)
GLUCOSE SERPL-MCNC: 212 MG/DL (ref 65–99)
HCT VFR BLD AUTO: 39.5 % (ref 37.5–51)
HDLC SERPL-MCNC: 42 MG/DL (ref 40–60)
HGB BLD-MCNC: 12.9 G/DL (ref 13–17.7)
HOLD SPECIMEN: NORMAL
INR PPP: 0.97 (ref 0.93–1.1)
LDLC SERPL CALC-MCNC: 89 MG/DL (ref 0–100)
LDLC/HDLC SERPL: 1.99 {RATIO}
LYMPHOCYTES # BLD AUTO: 2.1 10*3/MM3 (ref 0.7–3.1)
LYMPHOCYTES NFR BLD AUTO: 26.8 % (ref 19.6–45.3)
MCH RBC QN AUTO: 30.1 PG (ref 26.6–33)
MCHC RBC AUTO-ENTMCNC: 32.5 G/DL (ref 31.5–35.7)
MCV RBC AUTO: 92.5 FL (ref 79–97)
MONOCYTES # BLD AUTO: 0.8 10*3/MM3 (ref 0.1–0.9)
MONOCYTES NFR BLD AUTO: 9.7 % (ref 5–12)
NEUTROPHILS NFR BLD AUTO: 4.5 10*3/MM3 (ref 1.7–7)
NEUTROPHILS NFR BLD AUTO: 58.6 % (ref 42.7–76)
NRBC BLD AUTO-RTO: 0.1 /100 WBC (ref 0–0.2)
PLATELET # BLD AUTO: 250 10*3/MM3 (ref 140–450)
PMV BLD AUTO: 7.9 FL (ref 6–12)
POTASSIUM SERPL-SCNC: 4.5 MMOL/L (ref 3.5–5.2)
PROT SERPL-MCNC: 6.7 G/DL (ref 6–8.5)
PROTHROMBIN TIME: 10.7 SECONDS (ref 9.6–11.7)
QT INTERVAL: 423 MS
RBC # BLD AUTO: 4.27 10*6/MM3 (ref 4.14–5.8)
RH BLD: POSITIVE
SARS-COV-2 RNA PNL SPEC NAA+PROBE: NOT DETECTED
SODIUM SERPL-SCNC: 138 MMOL/L (ref 136–145)
T&S EXPIRATION DATE: NORMAL
TRIGL SERPL-MCNC: 198 MG/DL (ref 0–150)
TROPONIN T SERPL-MCNC: 0.04 NG/ML (ref 0–0.03)
VLDLC SERPL-MCNC: 34 MG/DL (ref 5–40)
WBC # BLD AUTO: 7.8 10*3/MM3 (ref 3.4–10.8)
WHOLE BLOOD HOLD SPECIMEN: NORMAL
WHOLE BLOOD HOLD SPECIMEN: NORMAL

## 2020-11-27 PROCEDURE — 93306 TTE W/DOPPLER COMPLETE: CPT

## 2020-11-27 PROCEDURE — 80053 COMPREHEN METABOLIC PANEL: CPT | Performed by: EMERGENCY MEDICINE

## 2020-11-27 PROCEDURE — 71045 X-RAY EXAM CHEST 1 VIEW: CPT

## 2020-11-27 PROCEDURE — 82962 GLUCOSE BLOOD TEST: CPT

## 2020-11-27 PROCEDURE — 25010000002 PROCHLORPERAZINE 10 MG/2ML SOLUTION: Performed by: EMERGENCY MEDICINE

## 2020-11-27 PROCEDURE — 0 IOPAMIDOL PER 1 ML: Performed by: EMERGENCY MEDICINE

## 2020-11-27 PROCEDURE — 99222 1ST HOSP IP/OBS MODERATE 55: CPT | Performed by: PSYCHIATRY & NEUROLOGY

## 2020-11-27 PROCEDURE — 85025 COMPLETE CBC W/AUTO DIFF WBC: CPT | Performed by: EMERGENCY MEDICINE

## 2020-11-27 PROCEDURE — G0378 HOSPITAL OBSERVATION PER HR: HCPCS

## 2020-11-27 PROCEDURE — 86901 BLOOD TYPING SEROLOGIC RH(D): CPT | Performed by: EMERGENCY MEDICINE

## 2020-11-27 PROCEDURE — 86900 BLOOD TYPING SEROLOGIC ABO: CPT

## 2020-11-27 PROCEDURE — 80162 ASSAY OF DIGOXIN TOTAL: CPT | Performed by: HOSPITALIST

## 2020-11-27 PROCEDURE — 25010000002 DIPHENHYDRAMINE PER 50 MG: Performed by: EMERGENCY MEDICINE

## 2020-11-27 PROCEDURE — 99220 PR INITIAL OBSERVATION CARE/DAY 70 MINUTES: CPT | Performed by: HOSPITALIST

## 2020-11-27 PROCEDURE — 87635 SARS-COV-2 COVID-19 AMP PRB: CPT | Performed by: EMERGENCY MEDICINE

## 2020-11-27 PROCEDURE — 63710000001 INSULIN ISOPHANE & REGULAR PER 5 UNITS: Performed by: HOSPITALIST

## 2020-11-27 PROCEDURE — 93306 TTE W/DOPPLER COMPLETE: CPT | Performed by: INTERNAL MEDICINE

## 2020-11-27 PROCEDURE — 70450 CT HEAD/BRAIN W/O DYE: CPT

## 2020-11-27 PROCEDURE — 85610 PROTHROMBIN TIME: CPT | Performed by: EMERGENCY MEDICINE

## 2020-11-27 PROCEDURE — 84484 ASSAY OF TROPONIN QUANT: CPT | Performed by: EMERGENCY MEDICINE

## 2020-11-27 PROCEDURE — 86901 BLOOD TYPING SEROLOGIC RH(D): CPT

## 2020-11-27 PROCEDURE — 86850 RBC ANTIBODY SCREEN: CPT | Performed by: EMERGENCY MEDICINE

## 2020-11-27 PROCEDURE — 70498 CT ANGIOGRAPHY NECK: CPT

## 2020-11-27 PROCEDURE — 70496 CT ANGIOGRAPHY HEAD: CPT

## 2020-11-27 PROCEDURE — 82746 ASSAY OF FOLIC ACID SERUM: CPT | Performed by: PSYCHIATRY & NEUROLOGY

## 2020-11-27 PROCEDURE — 85730 THROMBOPLASTIN TIME PARTIAL: CPT | Performed by: EMERGENCY MEDICINE

## 2020-11-27 PROCEDURE — 86900 BLOOD TYPING SEROLOGIC ABO: CPT | Performed by: EMERGENCY MEDICINE

## 2020-11-27 PROCEDURE — 70551 MRI BRAIN STEM W/O DYE: CPT

## 2020-11-27 PROCEDURE — 93005 ELECTROCARDIOGRAM TRACING: CPT | Performed by: EMERGENCY MEDICINE

## 2020-11-27 PROCEDURE — 63710000001 INSULIN LISPRO (HUMAN) PER 5 UNITS: Performed by: HOSPITALIST

## 2020-11-27 PROCEDURE — 99285 EMERGENCY DEPT VISIT HI MDM: CPT

## 2020-11-27 PROCEDURE — 80061 LIPID PANEL: CPT | Performed by: PSYCHIATRY & NEUROLOGY

## 2020-11-27 RX ORDER — ASPIRIN 325 MG
325 TABLET ORAL DAILY
Status: DISCONTINUED | OUTPATIENT
Start: 2020-11-28 | End: 2020-12-01 | Stop reason: HOSPADM

## 2020-11-27 RX ORDER — ACETAMINOPHEN 650 MG/1
650 SUPPOSITORY RECTAL EVERY 4 HOURS PRN
Status: DISCONTINUED | OUTPATIENT
Start: 2020-11-27 | End: 2020-12-01 | Stop reason: HOSPADM

## 2020-11-27 RX ORDER — INSULIN LISPRO 100 [IU]/ML
0-7 INJECTION, SOLUTION INTRAVENOUS; SUBCUTANEOUS AS NEEDED
Status: DISCONTINUED | OUTPATIENT
Start: 2020-11-27 | End: 2020-12-01 | Stop reason: DRUGHIGH

## 2020-11-27 RX ORDER — ACETAMINOPHEN 500 MG
1000 TABLET ORAL ONCE
Status: COMPLETED | OUTPATIENT
Start: 2020-11-27 | End: 2020-11-27

## 2020-11-27 RX ORDER — BUTALBITAL, ACETAMINOPHEN AND CAFFEINE 50; 325; 40 MG/1; MG/1; MG/1
1 TABLET ORAL EVERY 4 HOURS PRN
Status: DISCONTINUED | OUTPATIENT
Start: 2020-11-27 | End: 2020-12-01 | Stop reason: HOSPADM

## 2020-11-27 RX ORDER — INSULIN LISPRO 100 [IU]/ML
0-7 INJECTION, SOLUTION INTRAVENOUS; SUBCUTANEOUS
Status: DISCONTINUED | OUTPATIENT
Start: 2020-11-27 | End: 2020-12-01 | Stop reason: DRUGHIGH

## 2020-11-27 RX ORDER — ATENOLOL 50 MG/1
50 TABLET ORAL ONCE
Status: COMPLETED | OUTPATIENT
Start: 2020-11-27 | End: 2020-11-27

## 2020-11-27 RX ORDER — CLOPIDOGREL BISULFATE 75 MG/1
75 TABLET ORAL DAILY
Status: DISCONTINUED | OUTPATIENT
Start: 2020-11-27 | End: 2020-12-01 | Stop reason: HOSPADM

## 2020-11-27 RX ORDER — HYDROCODONE BITARTRATE AND ACETAMINOPHEN 10; 325 MG/1; MG/1
1 TABLET ORAL EVERY 8 HOURS PRN
Status: DISCONTINUED | OUTPATIENT
Start: 2020-11-27 | End: 2020-12-01 | Stop reason: HOSPADM

## 2020-11-27 RX ORDER — PROMETHAZINE HYDROCHLORIDE 12.5 MG/1
12.5 SUPPOSITORY RECTAL EVERY 6 HOURS PRN
Status: DISCONTINUED | OUTPATIENT
Start: 2020-11-27 | End: 2020-12-01 | Stop reason: HOSPADM

## 2020-11-27 RX ORDER — ATENOLOL 50 MG/1
50 TABLET ORAL
Status: DISCONTINUED | OUTPATIENT
Start: 2020-11-28 | End: 2020-11-27

## 2020-11-27 RX ORDER — DIPHENHYDRAMINE HYDROCHLORIDE 50 MG/ML
25 INJECTION INTRAMUSCULAR; INTRAVENOUS ONCE
Status: COMPLETED | OUTPATIENT
Start: 2020-11-27 | End: 2020-11-27

## 2020-11-27 RX ORDER — GABAPENTIN 300 MG/1
600 CAPSULE ORAL EVERY 8 HOURS SCHEDULED
Status: DISCONTINUED | OUTPATIENT
Start: 2020-11-27 | End: 2020-12-01 | Stop reason: HOSPADM

## 2020-11-27 RX ORDER — ALUMINA, MAGNESIA, AND SIMETHICONE 2400; 2400; 240 MG/30ML; MG/30ML; MG/30ML
15 SUSPENSION ORAL EVERY 6 HOURS PRN
Status: DISCONTINUED | OUTPATIENT
Start: 2020-11-27 | End: 2020-12-01 | Stop reason: HOSPADM

## 2020-11-27 RX ORDER — DEXTROSE MONOHYDRATE 25 G/50ML
25 INJECTION, SOLUTION INTRAVENOUS
Status: DISCONTINUED | OUTPATIENT
Start: 2020-11-27 | End: 2020-12-01 | Stop reason: HOSPADM

## 2020-11-27 RX ORDER — DIGOXIN 125 MCG
125 TABLET ORAL
Status: DISCONTINUED | OUTPATIENT
Start: 2020-11-27 | End: 2020-12-01 | Stop reason: HOSPADM

## 2020-11-27 RX ORDER — ATORVASTATIN CALCIUM 40 MG/1
40 TABLET, FILM COATED ORAL DAILY
Status: DISCONTINUED | OUTPATIENT
Start: 2020-11-27 | End: 2020-12-01 | Stop reason: HOSPADM

## 2020-11-27 RX ORDER — ACETAMINOPHEN 325 MG/1
650 TABLET ORAL EVERY 4 HOURS PRN
Status: DISCONTINUED | OUTPATIENT
Start: 2020-11-27 | End: 2020-12-01 | Stop reason: HOSPADM

## 2020-11-27 RX ORDER — ACETAMINOPHEN 160 MG/5ML
650 SOLUTION ORAL EVERY 4 HOURS PRN
Status: DISCONTINUED | OUTPATIENT
Start: 2020-11-27 | End: 2020-12-01 | Stop reason: HOSPADM

## 2020-11-27 RX ORDER — SODIUM CHLORIDE 0.9 % (FLUSH) 0.9 %
10 SYRINGE (ML) INJECTION AS NEEDED
Status: DISCONTINUED | OUTPATIENT
Start: 2020-11-27 | End: 2020-12-01 | Stop reason: HOSPADM

## 2020-11-27 RX ORDER — SODIUM CHLORIDE 0.9 % (FLUSH) 0.9 %
10 SYRINGE (ML) INJECTION EVERY 12 HOURS SCHEDULED
Status: DISCONTINUED | OUTPATIENT
Start: 2020-11-27 | End: 2020-12-01 | Stop reason: HOSPADM

## 2020-11-27 RX ORDER — PROMETHAZINE HYDROCHLORIDE 12.5 MG/1
12.5 TABLET ORAL EVERY 6 HOURS PRN
Status: DISCONTINUED | OUTPATIENT
Start: 2020-11-27 | End: 2020-12-01 | Stop reason: HOSPADM

## 2020-11-27 RX ORDER — NICOTINE POLACRILEX 4 MG
15 LOZENGE BUCCAL
Status: DISCONTINUED | OUTPATIENT
Start: 2020-11-27 | End: 2020-12-01 | Stop reason: HOSPADM

## 2020-11-27 RX ORDER — PROCHLORPERAZINE EDISYLATE 5 MG/ML
10 INJECTION INTRAMUSCULAR; INTRAVENOUS ONCE
Status: COMPLETED | OUTPATIENT
Start: 2020-11-27 | End: 2020-11-27

## 2020-11-27 RX ORDER — DULOXETIN HYDROCHLORIDE 30 MG/1
60 CAPSULE, DELAYED RELEASE ORAL DAILY
Status: DISCONTINUED | OUTPATIENT
Start: 2020-11-27 | End: 2020-12-01 | Stop reason: HOSPADM

## 2020-11-27 RX ADMIN — INSULIN LISPRO 3 UNITS: 100 INJECTION, SOLUTION INTRAVENOUS; SUBCUTANEOUS at 13:17

## 2020-11-27 RX ADMIN — ATENOLOL 50 MG: 50 TABLET ORAL at 22:32

## 2020-11-27 RX ADMIN — GABAPENTIN 600 MG: 300 CAPSULE ORAL at 21:00

## 2020-11-27 RX ADMIN — Medication 10 ML: at 20:52

## 2020-11-27 RX ADMIN — ACETAMINOPHEN 1000 MG: 500 TABLET, FILM COATED ORAL at 08:47

## 2020-11-27 RX ADMIN — DULOXETINE HYDROCHLORIDE 60 MG: 30 CAPSULE, DELAYED RELEASE ORAL at 13:12

## 2020-11-27 RX ADMIN — DIGOXIN 125 MCG: 125 TABLET ORAL at 13:12

## 2020-11-27 RX ADMIN — HYDROCODONE BITARTRATE AND ACETAMINOPHEN 1 TABLET: 10; 325 TABLET ORAL at 13:11

## 2020-11-27 RX ADMIN — PROCHLORPERAZINE EDISYLATE 10 MG: 5 INJECTION INTRAMUSCULAR; INTRAVENOUS at 10:15

## 2020-11-27 RX ADMIN — DIPHENHYDRAMINE HYDROCHLORIDE 25 MG: 50 INJECTION, SOLUTION INTRAMUSCULAR; INTRAVENOUS at 10:17

## 2020-11-27 RX ADMIN — INSULIN HUMAN 20 UNITS: 100 INJECTION, SUSPENSION SUBCUTANEOUS at 13:12

## 2020-11-27 RX ADMIN — Medication 10 ML: at 13:17

## 2020-11-27 RX ADMIN — IOPAMIDOL 100 ML: 755 INJECTION, SOLUTION INTRAVENOUS at 08:29

## 2020-11-27 RX ADMIN — INSULIN LISPRO 3 UNITS: 100 INJECTION, SOLUTION INTRAVENOUS; SUBCUTANEOUS at 16:58

## 2020-11-27 RX ADMIN — GABAPENTIN 600 MG: 300 CAPSULE ORAL at 13:12

## 2020-11-27 RX ADMIN — CLOPIDOGREL BISULFATE 75 MG: 75 TABLET ORAL at 13:12

## 2020-11-27 RX ADMIN — BUTALBITAL, ACETAMINOPHEN AND CAFFEINE 1 TABLET: 50; 325; 40 TABLET ORAL at 18:37

## 2020-11-27 RX ADMIN — ATORVASTATIN CALCIUM 40 MG: 40 TABLET, FILM COATED ORAL at 13:12

## 2020-11-28 LAB
ALBUMIN SERPL-MCNC: 3.8 G/DL (ref 3.5–5.2)
ALBUMIN/GLOB SERPL: 1.4 G/DL
ALP SERPL-CCNC: 55 U/L (ref 39–117)
ALT SERPL W P-5'-P-CCNC: 9 U/L (ref 1–41)
ANION GAP SERPL CALCULATED.3IONS-SCNC: 9 MMOL/L (ref 5–15)
AST SERPL-CCNC: 10 U/L (ref 1–40)
BH CV ECHO MEAS - ACS: 2.1 CM
BH CV ECHO MEAS - AO MAX PG (FULL): 0.33 MMHG
BH CV ECHO MEAS - AO MAX PG: 3.9 MMHG
BH CV ECHO MEAS - AO MEAN PG (FULL): 0.69 MMHG
BH CV ECHO MEAS - AO MEAN PG: 2.6 MMHG
BH CV ECHO MEAS - AO ROOT AREA (BSA CORRECTED): 1.5
BH CV ECHO MEAS - AO ROOT AREA: 9.5 CM^2
BH CV ECHO MEAS - AO ROOT DIAM: 3.5 CM
BH CV ECHO MEAS - AO V2 MAX: 98.6 CM/SEC
BH CV ECHO MEAS - AO V2 MEAN: 78.6 CM/SEC
BH CV ECHO MEAS - AO V2 VTI: 18.9 CM
BH CV ECHO MEAS - AORTIC HR: 79.6 BPM
BH CV ECHO MEAS - AORTIC R-R: 0.75 SEC
BH CV ECHO MEAS - ASC AORTA: 3.5 CM
BH CV ECHO MEAS - AVA(I,A): 3.9 CM^2
BH CV ECHO MEAS - AVA(I,D): 3.9 CM^2
BH CV ECHO MEAS - AVA(V,A): 3.1 CM^2
BH CV ECHO MEAS - AVA(V,D): 3.1 CM^2
BH CV ECHO MEAS - BSA(HAYCOCK): 2.5 M^2
BH CV ECHO MEAS - BSA: 2.3 M^2
BH CV ECHO MEAS - BZI_BMI: 40.3 KILOGRAMS/M^2
BH CV ECHO MEAS - BZI_METRIC_HEIGHT: 172.7 CM
BH CV ECHO MEAS - BZI_METRIC_WEIGHT: 120.2 KG
BH CV ECHO MEAS - CI(AO): 6.2 L/MIN/M^2
BH CV ECHO MEAS - CI(LVOT): 2.5 L/MIN/M^2
BH CV ECHO MEAS - CO(AO): 14.3 L/MIN
BH CV ECHO MEAS - CO(LVOT): 5.8 L/MIN
BH CV ECHO MEAS - EDV(CUBED): 168.3 ML
BH CV ECHO MEAS - EDV(MOD-SP2): 118.1 ML
BH CV ECHO MEAS - EDV(MOD-SP4): 83.1 ML
BH CV ECHO MEAS - EDV(TEICH): 148.7 ML
BH CV ECHO MEAS - EF(CUBED): 38.4 %
BH CV ECHO MEAS - EF(MOD-BP): 44 %
BH CV ECHO MEAS - EF(MOD-SP2): 36.9 %
BH CV ECHO MEAS - EF(MOD-SP4): 44.6 %
BH CV ECHO MEAS - EF(TEICH): 31.2 %
BH CV ECHO MEAS - ESV(CUBED): 103.7 ML
BH CV ECHO MEAS - ESV(MOD-SP2): 74.6 ML
BH CV ECHO MEAS - ESV(MOD-SP4): 46.1 ML
BH CV ECHO MEAS - ESV(TEICH): 102.3 ML
BH CV ECHO MEAS - FS: 14.9 %
BH CV ECHO MEAS - IVS/LVPW: 1.2
BH CV ECHO MEAS - IVSD: 1.5 CM
BH CV ECHO MEAS - LA DIMENSION(2D): 4.9 CM
BH CV ECHO MEAS - LV DIASTOLIC VOL/BSA (35-75): 36.1 ML/M^2
BH CV ECHO MEAS - LV MASS(C)D: 335.4 GRAMS
BH CV ECHO MEAS - LV MASS(C)DI: 145.6 GRAMS/M^2
BH CV ECHO MEAS - LV MAX PG: 3.6 MMHG
BH CV ECHO MEAS - LV MEAN PG: 1.9 MMHG
BH CV ECHO MEAS - LV SYSTOLIC VOL/BSA (12-30): 20 ML/M^2
BH CV ECHO MEAS - LV V1 MAX: 94.3 CM/SEC
BH CV ECHO MEAS - LV V1 MEAN: 64.5 CM/SEC
BH CV ECHO MEAS - LV V1 VTI: 22.6 CM
BH CV ECHO MEAS - LVIDD: 5.5 CM
BH CV ECHO MEAS - LVIDS: 4.7 CM
BH CV ECHO MEAS - LVOT AREA: 3.2 CM^2
BH CV ECHO MEAS - LVOT DIAM: 2 CM
BH CV ECHO MEAS - LVPWD: 1.2 CM
BH CV ECHO MEAS - MR MAX PG: 39.2 MMHG
BH CV ECHO MEAS - MR MAX VEL: 313.1 CM/SEC
BH CV ECHO MEAS - MV MAX PG: 8.4 MMHG
BH CV ECHO MEAS - MV MEAN PG: 2.5 MMHG
BH CV ECHO MEAS - MV V2 MAX: 144.9 CM/SEC
BH CV ECHO MEAS - MV V2 MEAN: 70.8 CM/SEC
BH CV ECHO MEAS - MV V2 VTI: 33.4 CM
BH CV ECHO MEAS - MVA(VTI): 2.2 CM^2
BH CV ECHO MEAS - PA ACC TIME: 0.11 SEC
BH CV ECHO MEAS - PA MAX PG (FULL): 1.3 MMHG
BH CV ECHO MEAS - PA MAX PG: 2.7 MMHG
BH CV ECHO MEAS - PA MEAN PG (FULL): 0.9 MMHG
BH CV ECHO MEAS - PA MEAN PG: 1.8 MMHG
BH CV ECHO MEAS - PA PR(ACCEL): 29.3 MMHG
BH CV ECHO MEAS - PA V2 MAX: 82.8 CM/SEC
BH CV ECHO MEAS - PA V2 MEAN: 66.3 CM/SEC
BH CV ECHO MEAS - PA V2 VTI: 22.4 CM
BH CV ECHO MEAS - PI END-D VEL: 118.6 CM/SEC
BH CV ECHO MEAS - PI MAX PG: 10 MMHG
BH CV ECHO MEAS - PI MAX VEL: 158.1 CM/SEC
BH CV ECHO MEAS - PVA(I,A): 2.7 CM^2
BH CV ECHO MEAS - PVA(I,D): 2.7 CM^2
BH CV ECHO MEAS - PVA(V,A): 3.5 CM^2
BH CV ECHO MEAS - PVA(V,D): 3.5 CM^2
BH CV ECHO MEAS - QP/QS: 0.85
BH CV ECHO MEAS - RAP SYSTOLE: 3 MMHG
BH CV ECHO MEAS - RV MAX PG: 1.4 MMHG
BH CV ECHO MEAS - RV MEAN PG: 0.95 MMHG
BH CV ECHO MEAS - RV V1 MAX: 60 CM/SEC
BH CV ECHO MEAS - RV V1 MEAN: 47.5 CM/SEC
BH CV ECHO MEAS - RV V1 VTI: 12.8 CM
BH CV ECHO MEAS - RVDD: 3.7 CM
BH CV ECHO MEAS - RVOT AREA: 4.8 CM^2
BH CV ECHO MEAS - RVOT DIAM: 2.5 CM
BH CV ECHO MEAS - RVSP: 33.4 MMHG
BH CV ECHO MEAS - SI(AO): 77.8 ML/M^2
BH CV ECHO MEAS - SI(CUBED): 28 ML/M^2
BH CV ECHO MEAS - SI(LVOT): 31.6 ML/M^2
BH CV ECHO MEAS - SI(MOD-SP2): 18.9 ML/M^2
BH CV ECHO MEAS - SI(MOD-SP4): 16.1 ML/M^2
BH CV ECHO MEAS - SI(TEICH): 20.2 ML/M^2
BH CV ECHO MEAS - SV(AO): 179.1 ML
BH CV ECHO MEAS - SV(CUBED): 64.6 ML
BH CV ECHO MEAS - SV(LVOT): 72.8 ML
BH CV ECHO MEAS - SV(MOD-SP2): 43.5 ML
BH CV ECHO MEAS - SV(MOD-SP4): 37.1 ML
BH CV ECHO MEAS - SV(RVOT): 61.7 ML
BH CV ECHO MEAS - SV(TEICH): 46.4 ML
BH CV ECHO MEAS - TR MAX VEL: 274.5 CM/SEC
BILIRUB SERPL-MCNC: 1 MG/DL (ref 0–1.2)
BUN SERPL-MCNC: 29 MG/DL (ref 8–23)
BUN/CREAT SERPL: 20 (ref 7–25)
CALCIUM SPEC-SCNC: 8.8 MG/DL (ref 8.6–10.5)
CHLORIDE SERPL-SCNC: 105 MMOL/L (ref 98–107)
CO2 SERPL-SCNC: 25 MMOL/L (ref 22–29)
CREAT SERPL-MCNC: 1.45 MG/DL (ref 0.76–1.27)
DEPRECATED RDW RBC AUTO: 46.4 FL (ref 37–54)
ERYTHROCYTE [DISTWIDTH] IN BLOOD BY AUTOMATED COUNT: 14.4 % (ref 12.3–15.4)
ERYTHROCYTE [SEDIMENTATION RATE] IN BLOOD: 4 MM/HR (ref 0–20)
GFR SERPL CREATININE-BSD FRML MDRD: 48 ML/MIN/1.73
GLOBULIN UR ELPH-MCNC: 2.7 GM/DL
GLUCOSE BLDC GLUCOMTR-MCNC: 150 MG/DL (ref 70–105)
GLUCOSE BLDC GLUCOMTR-MCNC: 161 MG/DL (ref 70–105)
GLUCOSE BLDC GLUCOMTR-MCNC: 220 MG/DL (ref 70–105)
GLUCOSE BLDC GLUCOMTR-MCNC: 247 MG/DL (ref 70–105)
GLUCOSE SERPL-MCNC: 185 MG/DL (ref 65–99)
HBA1C MFR BLD: 8.6 % (ref 3.5–5.6)
HCT VFR BLD AUTO: 38 % (ref 37.5–51)
HGB BLD-MCNC: 12.5 G/DL (ref 13–17.7)
LV EF 2D ECHO EST: 40 %
MAGNESIUM SERPL-MCNC: 2 MG/DL (ref 1.6–2.4)
MCH RBC QN AUTO: 30.1 PG (ref 26.6–33)
MCHC RBC AUTO-ENTMCNC: 32.9 G/DL (ref 31.5–35.7)
MCV RBC AUTO: 91.7 FL (ref 79–97)
PHOSPHATE SERPL-MCNC: 3.2 MG/DL (ref 2.5–4.5)
PLATELET # BLD AUTO: 260 10*3/MM3 (ref 140–450)
PMV BLD AUTO: 8.4 FL (ref 6–12)
POTASSIUM SERPL-SCNC: 4.3 MMOL/L (ref 3.5–5.2)
PROT SERPL-MCNC: 6.5 G/DL (ref 6–8.5)
RBC # BLD AUTO: 4.14 10*6/MM3 (ref 4.14–5.8)
SODIUM SERPL-SCNC: 139 MMOL/L (ref 136–145)
T4 FREE SERPL-MCNC: 0.81 NG/DL (ref 0.93–1.7)
TSH SERPL DL<=0.05 MIU/L-ACNC: 1.51 UIU/ML (ref 0.27–4.2)
VIT B12 BLD-MCNC: 453 PG/ML (ref 211–946)
WBC # BLD AUTO: 8.7 10*3/MM3 (ref 3.4–10.8)

## 2020-11-28 PROCEDURE — 83735 ASSAY OF MAGNESIUM: CPT | Performed by: HOSPITALIST

## 2020-11-28 PROCEDURE — 80053 COMPREHEN METABOLIC PANEL: CPT | Performed by: HOSPITALIST

## 2020-11-28 PROCEDURE — 82962 GLUCOSE BLOOD TEST: CPT

## 2020-11-28 PROCEDURE — 85027 COMPLETE CBC AUTOMATED: CPT | Performed by: HOSPITALIST

## 2020-11-28 PROCEDURE — 99222 1ST HOSP IP/OBS MODERATE 55: CPT | Performed by: INTERNAL MEDICINE

## 2020-11-28 PROCEDURE — 97162 PT EVAL MOD COMPLEX 30 MIN: CPT

## 2020-11-28 PROCEDURE — 84439 ASSAY OF FREE THYROXINE: CPT | Performed by: HOSPITALIST

## 2020-11-28 PROCEDURE — 63710000001 PROMETHAZINE PER 12.5 MG: Performed by: HOSPITALIST

## 2020-11-28 PROCEDURE — 85651 RBC SED RATE NONAUTOMATED: CPT | Performed by: HOSPITALIST

## 2020-11-28 PROCEDURE — 97166 OT EVAL MOD COMPLEX 45 MIN: CPT

## 2020-11-28 PROCEDURE — 84100 ASSAY OF PHOSPHORUS: CPT | Performed by: HOSPITALIST

## 2020-11-28 PROCEDURE — 97112 NEUROMUSCULAR REEDUCATION: CPT

## 2020-11-28 PROCEDURE — 82607 VITAMIN B-12: CPT | Performed by: HOSPITALIST

## 2020-11-28 PROCEDURE — 99232 SBSQ HOSP IP/OBS MODERATE 35: CPT | Performed by: HOSPITALIST

## 2020-11-28 PROCEDURE — 63710000001 INSULIN LISPRO (HUMAN) PER 5 UNITS: Performed by: HOSPITALIST

## 2020-11-28 PROCEDURE — 63710000001 INSULIN ISOPHANE & REGULAR PER 5 UNITS: Performed by: HOSPITALIST

## 2020-11-28 PROCEDURE — 83036 HEMOGLOBIN GLYCOSYLATED A1C: CPT | Performed by: HOSPITALIST

## 2020-11-28 PROCEDURE — 84443 ASSAY THYROID STIM HORMONE: CPT | Performed by: HOSPITALIST

## 2020-11-28 PROCEDURE — 97530 THERAPEUTIC ACTIVITIES: CPT

## 2020-11-28 RX ADMIN — Medication 10 ML: at 20:02

## 2020-11-28 RX ADMIN — INSULIN LISPRO 2 UNITS: 100 INJECTION, SOLUTION INTRAVENOUS; SUBCUTANEOUS at 17:08

## 2020-11-28 RX ADMIN — ATORVASTATIN CALCIUM 40 MG: 40 TABLET, FILM COATED ORAL at 10:19

## 2020-11-28 RX ADMIN — GABAPENTIN 600 MG: 300 CAPSULE ORAL at 05:00

## 2020-11-28 RX ADMIN — DIGOXIN 125 MCG: 125 TABLET ORAL at 12:44

## 2020-11-28 RX ADMIN — BUTALBITAL, ACETAMINOPHEN AND CAFFEINE 1 TABLET: 50; 325; 40 TABLET ORAL at 17:09

## 2020-11-28 RX ADMIN — Medication 10 ML: at 10:20

## 2020-11-28 RX ADMIN — GABAPENTIN 600 MG: 300 CAPSULE ORAL at 14:09

## 2020-11-28 RX ADMIN — INSULIN LISPRO 3 UNITS: 100 INJECTION, SOLUTION INTRAVENOUS; SUBCUTANEOUS at 10:21

## 2020-11-28 RX ADMIN — ASPIRIN 325 MG ORAL TABLET 325 MG: 325 PILL ORAL at 10:19

## 2020-11-28 RX ADMIN — CLOPIDOGREL BISULFATE 75 MG: 75 TABLET ORAL at 10:19

## 2020-11-28 RX ADMIN — INSULIN HUMAN 20 UNITS: 100 INJECTION, SUSPENSION SUBCUTANEOUS at 10:20

## 2020-11-28 RX ADMIN — DULOXETINE HYDROCHLORIDE 60 MG: 30 CAPSULE, DELAYED RELEASE ORAL at 10:20

## 2020-11-28 RX ADMIN — INSULIN LISPRO 3 UNITS: 100 INJECTION, SOLUTION INTRAVENOUS; SUBCUTANEOUS at 12:44

## 2020-11-28 RX ADMIN — ACETAMINOPHEN 650 MG: 325 TABLET, FILM COATED ORAL at 10:19

## 2020-11-28 RX ADMIN — GABAPENTIN 600 MG: 300 CAPSULE ORAL at 22:09

## 2020-11-28 RX ADMIN — PROMETHAZINE HYDROCHLORIDE 12.5 MG: 12.5 TABLET ORAL at 17:09

## 2020-11-29 ENCOUNTER — APPOINTMENT (OUTPATIENT)
Dept: CT IMAGING | Facility: HOSPITAL | Age: 70
End: 2020-11-29

## 2020-11-29 LAB
ARTERIAL PATENCY WRIST A: POSITIVE
ATMOSPHERIC PRESS: ABNORMAL MM[HG]
BASE EXCESS BLDA CALC-SCNC: 0.8 MMOL/L (ref 0–3)
BDY SITE: ABNORMAL
CO2 BLDA-SCNC: 27.3 MMOL/L (ref 22–29)
GLUCOSE BLDC GLUCOMTR-MCNC: 157 MG/DL (ref 70–105)
GLUCOSE BLDC GLUCOMTR-MCNC: 166 MG/DL (ref 70–105)
GLUCOSE BLDC GLUCOMTR-MCNC: 200 MG/DL (ref 70–105)
GLUCOSE BLDC GLUCOMTR-MCNC: 244 MG/DL (ref 70–105)
HCO3 BLDA-SCNC: 26 MMOL/L (ref 21–28)
HEMODILUTION: NO
INHALED O2 CONCENTRATION: 21 %
MODALITY: ABNORMAL
PCO2 BLDA: 43.1 MM HG (ref 35–48)
PH BLDA: 7.39 PH UNITS (ref 7.35–7.45)
PO2 BLDA: 62.3 MM HG (ref 83–108)
SAO2 % BLDCOA: 91.1 % (ref 94–98)

## 2020-11-29 PROCEDURE — 63710000001 INSULIN ISOPHANE & REGULAR PER 5 UNITS: Performed by: HOSPITALIST

## 2020-11-29 PROCEDURE — 99233 SBSQ HOSP IP/OBS HIGH 50: CPT | Performed by: INTERNAL MEDICINE

## 2020-11-29 PROCEDURE — 63710000001 PROMETHAZINE PER 12.5 MG: Performed by: HOSPITALIST

## 2020-11-29 PROCEDURE — 99232 SBSQ HOSP IP/OBS MODERATE 35: CPT | Performed by: HOSPITALIST

## 2020-11-29 PROCEDURE — 63710000001 INSULIN LISPRO (HUMAN) PER 5 UNITS: Performed by: HOSPITALIST

## 2020-11-29 PROCEDURE — 82803 BLOOD GASES ANY COMBINATION: CPT

## 2020-11-29 PROCEDURE — 82962 GLUCOSE BLOOD TEST: CPT

## 2020-11-29 PROCEDURE — 93005 ELECTROCARDIOGRAM TRACING: CPT | Performed by: NURSE PRACTITIONER

## 2020-11-29 PROCEDURE — 93010 ELECTROCARDIOGRAM REPORT: CPT | Performed by: INTERNAL MEDICINE

## 2020-11-29 PROCEDURE — 36600 WITHDRAWAL OF ARTERIAL BLOOD: CPT

## 2020-11-29 PROCEDURE — 70450 CT HEAD/BRAIN W/O DYE: CPT

## 2020-11-29 RX ORDER — ATENOLOL 50 MG/1
100 TABLET ORAL DAILY
Status: DISCONTINUED | OUTPATIENT
Start: 2020-11-29 | End: 2020-12-01 | Stop reason: HOSPADM

## 2020-11-29 RX ORDER — ISOSORBIDE MONONITRATE 60 MG/1
60 TABLET, EXTENDED RELEASE ORAL 2 TIMES DAILY
Status: DISCONTINUED | OUTPATIENT
Start: 2020-11-29 | End: 2020-12-01 | Stop reason: HOSPADM

## 2020-11-29 RX ORDER — ENALAPRIL MALEATE 5 MG/1
5 TABLET ORAL DAILY
Status: DISCONTINUED | OUTPATIENT
Start: 2020-11-29 | End: 2020-12-01 | Stop reason: HOSPADM

## 2020-11-29 RX ADMIN — ATENOLOL 100 MG: 50 TABLET ORAL at 08:40

## 2020-11-29 RX ADMIN — BUTALBITAL, ACETAMINOPHEN AND CAFFEINE 1 TABLET: 50; 325; 40 TABLET ORAL at 12:04

## 2020-11-29 RX ADMIN — PROMETHAZINE HYDROCHLORIDE 12.5 MG: 12.5 TABLET ORAL at 12:04

## 2020-11-29 RX ADMIN — INSULIN LISPRO 3 UNITS: 100 INJECTION, SOLUTION INTRAVENOUS; SUBCUTANEOUS at 18:07

## 2020-11-29 RX ADMIN — ISOSORBIDE MONONITRATE 60 MG: 60 TABLET, EXTENDED RELEASE ORAL at 20:51

## 2020-11-29 RX ADMIN — CLOPIDOGREL BISULFATE 75 MG: 75 TABLET ORAL at 08:40

## 2020-11-29 RX ADMIN — DULOXETINE HYDROCHLORIDE 60 MG: 30 CAPSULE, DELAYED RELEASE ORAL at 08:40

## 2020-11-29 RX ADMIN — Medication 10 ML: at 20:51

## 2020-11-29 RX ADMIN — GABAPENTIN 600 MG: 300 CAPSULE ORAL at 06:38

## 2020-11-29 RX ADMIN — ATORVASTATIN CALCIUM 40 MG: 40 TABLET, FILM COATED ORAL at 08:41

## 2020-11-29 RX ADMIN — Medication 10 ML: at 08:43

## 2020-11-29 RX ADMIN — ISOSORBIDE MONONITRATE 60 MG: 60 TABLET, EXTENDED RELEASE ORAL at 08:41

## 2020-11-29 RX ADMIN — ENALAPRIL MALEATE 5 MG: 5 TABLET ORAL at 08:41

## 2020-11-29 RX ADMIN — GABAPENTIN 600 MG: 300 CAPSULE ORAL at 15:30

## 2020-11-29 RX ADMIN — DIGOXIN 125 MCG: 125 TABLET ORAL at 11:39

## 2020-11-29 RX ADMIN — INSULIN LISPRO 3 UNITS: 100 INJECTION, SOLUTION INTRAVENOUS; SUBCUTANEOUS at 08:41

## 2020-11-29 RX ADMIN — INSULIN HUMAN 20 UNITS: 100 INJECTION, SUSPENSION SUBCUTANEOUS at 08:42

## 2020-11-29 RX ADMIN — INSULIN LISPRO 3 UNITS: 100 INJECTION, SOLUTION INTRAVENOUS; SUBCUTANEOUS at 11:39

## 2020-11-29 RX ADMIN — GABAPENTIN 600 MG: 300 CAPSULE ORAL at 21:00

## 2020-11-29 RX ADMIN — ASPIRIN 325 MG ORAL TABLET 325 MG: 325 PILL ORAL at 08:40

## 2020-11-30 ENCOUNTER — ANESTHESIA EVENT (OUTPATIENT)
Dept: CARDIOLOGY | Facility: HOSPITAL | Age: 70
End: 2020-11-30

## 2020-11-30 ENCOUNTER — APPOINTMENT (OUTPATIENT)
Dept: CARDIOLOGY | Facility: HOSPITAL | Age: 70
End: 2020-11-30

## 2020-11-30 ENCOUNTER — ANESTHESIA (OUTPATIENT)
Dept: CARDIOLOGY | Facility: HOSPITAL | Age: 70
End: 2020-11-30

## 2020-11-30 LAB
ANION GAP SERPL CALCULATED.3IONS-SCNC: 11 MMOL/L (ref 5–15)
BASOPHILS # BLD AUTO: 0.1 10*3/MM3 (ref 0–0.2)
BASOPHILS NFR BLD AUTO: 1 % (ref 0–1.5)
BUN SERPL-MCNC: 38 MG/DL (ref 8–23)
BUN/CREAT SERPL: 27.9 (ref 7–25)
CALCIUM SPEC-SCNC: 8.2 MG/DL (ref 8.6–10.5)
CHLORIDE SERPL-SCNC: 103 MMOL/L (ref 98–107)
CO2 SERPL-SCNC: 24 MMOL/L (ref 22–29)
CREAT SERPL-MCNC: 1.36 MG/DL (ref 0.76–1.27)
DEPRECATED RDW RBC AUTO: 45.5 FL (ref 37–54)
EOSINOPHIL # BLD AUTO: 0.4 10*3/MM3 (ref 0–0.4)
EOSINOPHIL NFR BLD AUTO: 5.3 % (ref 0.3–6.2)
ERYTHROCYTE [DISTWIDTH] IN BLOOD BY AUTOMATED COUNT: 14.3 % (ref 12.3–15.4)
GFR SERPL CREATININE-BSD FRML MDRD: 52 ML/MIN/1.73
GLUCOSE BLDC GLUCOMTR-MCNC: 149 MG/DL (ref 70–105)
GLUCOSE BLDC GLUCOMTR-MCNC: 167 MG/DL (ref 70–105)
GLUCOSE BLDC GLUCOMTR-MCNC: 221 MG/DL (ref 70–105)
GLUCOSE BLDC GLUCOMTR-MCNC: 270 MG/DL (ref 70–105)
GLUCOSE SERPL-MCNC: 144 MG/DL (ref 65–99)
HCT VFR BLD AUTO: 32.9 % (ref 37.5–51)
HGB BLD-MCNC: 10.9 G/DL (ref 13–17.7)
LYMPHOCYTES # BLD AUTO: 2.3 10*3/MM3 (ref 0.7–3.1)
LYMPHOCYTES NFR BLD AUTO: 32.2 % (ref 19.6–45.3)
MAGNESIUM SERPL-MCNC: 2.2 MG/DL (ref 1.6–2.4)
MCH RBC QN AUTO: 30.3 PG (ref 26.6–33)
MCHC RBC AUTO-ENTMCNC: 33.1 G/DL (ref 31.5–35.7)
MCV RBC AUTO: 91.7 FL (ref 79–97)
MONOCYTES # BLD AUTO: 1 10*3/MM3 (ref 0.1–0.9)
MONOCYTES NFR BLD AUTO: 14.7 % (ref 5–12)
NEUTROPHILS NFR BLD AUTO: 3.3 10*3/MM3 (ref 1.7–7)
NEUTROPHILS NFR BLD AUTO: 46.8 % (ref 42.7–76)
NRBC BLD AUTO-RTO: 0 /100 WBC (ref 0–0.2)
PLATELET # BLD AUTO: 220 10*3/MM3 (ref 140–450)
PMV BLD AUTO: 8 FL (ref 6–12)
POTASSIUM SERPL-SCNC: 4.2 MMOL/L (ref 3.5–5.2)
QT INTERVAL: 535 MS
RBC # BLD AUTO: 3.58 10*6/MM3 (ref 4.14–5.8)
SODIUM SERPL-SCNC: 138 MMOL/L (ref 136–145)
WBC # BLD AUTO: 7.1 10*3/MM3 (ref 3.4–10.8)

## 2020-11-30 PROCEDURE — 93312 ECHO TRANSESOPHAGEAL: CPT | Performed by: INTERNAL MEDICINE

## 2020-11-30 PROCEDURE — 99232 SBSQ HOSP IP/OBS MODERATE 35: CPT | Performed by: INTERNAL MEDICINE

## 2020-11-30 PROCEDURE — 82962 GLUCOSE BLOOD TEST: CPT

## 2020-11-30 PROCEDURE — 93320 DOPPLER ECHO COMPLETE: CPT

## 2020-11-30 PROCEDURE — 93325 DOPPLER ECHO COLOR FLOW MAPG: CPT | Performed by: INTERNAL MEDICINE

## 2020-11-30 PROCEDURE — 83735 ASSAY OF MAGNESIUM: CPT | Performed by: HOSPITALIST

## 2020-11-30 PROCEDURE — 63710000001 INSULIN LISPRO (HUMAN) PER 5 UNITS: Performed by: HOSPITALIST

## 2020-11-30 PROCEDURE — 99233 SBSQ HOSP IP/OBS HIGH 50: CPT | Performed by: INTERNAL MEDICINE

## 2020-11-30 PROCEDURE — 93325 DOPPLER ECHO COLOR FLOW MAPG: CPT

## 2020-11-30 PROCEDURE — 97530 THERAPEUTIC ACTIVITIES: CPT

## 2020-11-30 PROCEDURE — 97116 GAIT TRAINING THERAPY: CPT

## 2020-11-30 PROCEDURE — 93320 DOPPLER ECHO COMPLETE: CPT | Performed by: INTERNAL MEDICINE

## 2020-11-30 PROCEDURE — 80048 BASIC METABOLIC PNL TOTAL CA: CPT | Performed by: HOSPITALIST

## 2020-11-30 PROCEDURE — 25010000002 PROPOFOL 10 MG/ML EMULSION: Performed by: ANESTHESIOLOGY

## 2020-11-30 PROCEDURE — 93312 ECHO TRANSESOPHAGEAL: CPT

## 2020-11-30 PROCEDURE — 85025 COMPLETE CBC W/AUTO DIFF WBC: CPT | Performed by: HOSPITALIST

## 2020-11-30 RX ORDER — SODIUM CHLORIDE 9 MG/ML
20 INJECTION, SOLUTION INTRAVENOUS CONTINUOUS
Status: DISCONTINUED | OUTPATIENT
Start: 2020-11-30 | End: 2020-12-01 | Stop reason: HOSPADM

## 2020-11-30 RX ORDER — PROPOFOL 10 MG/ML
VIAL (ML) INTRAVENOUS AS NEEDED
Status: DISCONTINUED | OUTPATIENT
Start: 2020-11-30 | End: 2020-11-30 | Stop reason: SURG

## 2020-11-30 RX ADMIN — GABAPENTIN 600 MG: 300 CAPSULE ORAL at 15:00

## 2020-11-30 RX ADMIN — DIGOXIN 125 MCG: 125 TABLET ORAL at 12:41

## 2020-11-30 RX ADMIN — GABAPENTIN 600 MG: 300 CAPSULE ORAL at 21:35

## 2020-11-30 RX ADMIN — ISOSORBIDE MONONITRATE 60 MG: 60 TABLET, EXTENDED RELEASE ORAL at 12:41

## 2020-11-30 RX ADMIN — Medication 10 ML: at 21:35

## 2020-11-30 RX ADMIN — INSULIN LISPRO 2 UNITS: 100 INJECTION, SOLUTION INTRAVENOUS; SUBCUTANEOUS at 12:53

## 2020-11-30 RX ADMIN — DULOXETINE HYDROCHLORIDE 60 MG: 30 CAPSULE, DELAYED RELEASE ORAL at 12:41

## 2020-11-30 RX ADMIN — Medication 10 ML: at 08:10

## 2020-11-30 RX ADMIN — ISOSORBIDE MONONITRATE 60 MG: 60 TABLET, EXTENDED RELEASE ORAL at 21:35

## 2020-11-30 RX ADMIN — ASPIRIN 325 MG ORAL TABLET 325 MG: 325 PILL ORAL at 12:40

## 2020-11-30 RX ADMIN — ATENOLOL 100 MG: 50 TABLET ORAL at 12:41

## 2020-11-30 RX ADMIN — PROPOFOL 150 MG: 10 INJECTION, EMULSION INTRAVENOUS at 09:22

## 2020-11-30 RX ADMIN — INSULIN LISPRO 3 UNITS: 100 INJECTION, SOLUTION INTRAVENOUS; SUBCUTANEOUS at 18:01

## 2020-11-30 NOTE — ANESTHESIA POSTPROCEDURE EVALUATION
Patient: Benson Mclean    Procedure Summary     Date: 11/30/20 Room / Location: [facility]    Anesthesia Start: 0920 Anesthesia Stop: 0934    Procedure: ADULT TRANSESOPHAGEAL ECHO (MARSHA) W/ CONT IF NECESSARY PER PROTOCOL Diagnosis: (Cardiac Source of Emboli)    Scheduled Providers: Neymar Brooks MD Provider: Neymar Brooks MD    Anesthesia Type: MAC ASA Status: 4          Anesthesia Type: MAC    Vitals  Vitals Value Taken Time   /55 11/30/20 0931   Temp     Pulse 63 11/30/20 0931   Resp 12 11/30/20 0931   SpO2 100 % 11/30/20 0931           Post Anesthesia Care and Evaluation    Patient location during evaluation: PACU  Patient participation: complete - patient participated  Level of consciousness: awake  Pain scale: See nurse's notes for pain score.  Pain management: adequate  Airway patency: patent  Anesthetic complications: No anesthetic complications  PONV Status: none  Cardiovascular status: acceptable  Respiratory status: acceptable  Hydration status: acceptable    Comments: Patient seen and examined postoperatively; vital signs stable; SpO2 greater than or equal to 90%; cardiopulmonary status stable; nausea/vomiting adequately controlled; pain adequately controlled; no apparent anesthesia complications; patient discharged from anesthesia care when discharge criteria were met

## 2020-11-30 NOTE — ANESTHESIA PREPROCEDURE EVALUATION
Anesthesia Evaluation     Patient summary reviewed and Nursing notes reviewed   NPO Solid Status: > 8 hours  NPO Liquid Status: > 8 hours           Airway   Mallampati: II  TM distance: >3 FB  Neck ROM: full  No difficulty expected  Dental - normal exam     Pulmonary - normal exam    breath sounds clear to auscultation  (+) sleep apnea on CPAP,   Cardiovascular - normal exam  Exercise tolerance: unable to assess    ECG reviewed  Rhythm: regular  Rate: normal    (+) hypertension, CABG, hyperlipidemia,       Neuro/Psych  (+) CVA, headaches, numbness,     GI/Hepatic/Renal/Endo    (+)   diabetes mellitus,     Musculoskeletal (-) negative ROS    Abdominal  - normal exam   Substance History - negative use     OB/GYN negative ob/gyn ROS         Other - negative ROS                       Anesthesia Plan    ASA 4     MAC     intravenous induction     Anesthetic plan, all risks, benefits, and alternatives have been provided, discussed and informed consent has been obtained with: patient.

## 2020-12-01 VITALS
WEIGHT: 276.46 LBS | HEART RATE: 52 BPM | BODY MASS INDEX: 41.9 KG/M2 | DIASTOLIC BLOOD PRESSURE: 68 MMHG | OXYGEN SATURATION: 97 % | RESPIRATION RATE: 16 BRPM | TEMPERATURE: 97.8 F | HEIGHT: 68 IN | SYSTOLIC BLOOD PRESSURE: 152 MMHG

## 2020-12-01 LAB
GLUCOSE BLDC GLUCOMTR-MCNC: 191 MG/DL (ref 70–105)
GLUCOSE BLDC GLUCOMTR-MCNC: 216 MG/DL (ref 70–105)
GLUCOSE BLDC GLUCOMTR-MCNC: 269 MG/DL (ref 70–105)

## 2020-12-01 PROCEDURE — 63710000001 INSULIN LISPRO (HUMAN) PER 5 UNITS: Performed by: INTERNAL MEDICINE

## 2020-12-01 PROCEDURE — 82962 GLUCOSE BLOOD TEST: CPT

## 2020-12-01 PROCEDURE — 97112 NEUROMUSCULAR REEDUCATION: CPT

## 2020-12-01 PROCEDURE — 63710000001 INSULIN LISPRO (HUMAN) PER 5 UNITS: Performed by: HOSPITALIST

## 2020-12-01 PROCEDURE — 97530 THERAPEUTIC ACTIVITIES: CPT

## 2020-12-01 PROCEDURE — 99232 SBSQ HOSP IP/OBS MODERATE 35: CPT | Performed by: INTERNAL MEDICINE

## 2020-12-01 PROCEDURE — 63710000001 INSULIN ISOPHANE & REGULAR PER 5 UNITS: Performed by: HOSPITALIST

## 2020-12-01 PROCEDURE — 99239 HOSP IP/OBS DSCHRG MGMT >30: CPT | Performed by: INTERNAL MEDICINE

## 2020-12-01 PROCEDURE — 63710000001 PROMETHAZINE PER 12.5 MG: Performed by: HOSPITALIST

## 2020-12-01 RX ORDER — INSULIN LISPRO 100 [IU]/ML
0-9 INJECTION, SOLUTION INTRAVENOUS; SUBCUTANEOUS
Status: DISCONTINUED | OUTPATIENT
Start: 2020-12-01 | End: 2020-12-01 | Stop reason: HOSPADM

## 2020-12-01 RX ORDER — BUTALBITAL, ACETAMINOPHEN AND CAFFEINE 50; 325; 40 MG/1; MG/1; MG/1
1 TABLET ORAL EVERY 4 HOURS PRN
Qty: 10 TABLET | Refills: 0 | Status: SHIPPED | OUTPATIENT
Start: 2020-12-01 | End: 2021-05-06

## 2020-12-01 RX ORDER — INSULIN LISPRO 100 [IU]/ML
0-9 INJECTION, SOLUTION INTRAVENOUS; SUBCUTANEOUS AS NEEDED
Status: DISCONTINUED | OUTPATIENT
Start: 2020-12-01 | End: 2020-12-01 | Stop reason: HOSPADM

## 2020-12-01 RX ORDER — HYDROCODONE BITARTRATE AND ACETAMINOPHEN 10; 325 MG/1; MG/1
1 TABLET ORAL 2 TIMES DAILY PRN
Qty: 10 TABLET | Refills: 0 | Status: SHIPPED | OUTPATIENT
Start: 2020-12-01 | End: 2021-05-06

## 2020-12-01 RX ADMIN — INSULIN LISPRO 2 UNITS: 100 INJECTION, SOLUTION INTRAVENOUS; SUBCUTANEOUS at 18:13

## 2020-12-01 RX ADMIN — ATENOLOL 100 MG: 50 TABLET ORAL at 09:00

## 2020-12-01 RX ADMIN — DULOXETINE HYDROCHLORIDE 60 MG: 30 CAPSULE, DELAYED RELEASE ORAL at 09:01

## 2020-12-01 RX ADMIN — ENALAPRIL MALEATE 5 MG: 5 TABLET ORAL at 09:01

## 2020-12-01 RX ADMIN — CLOPIDOGREL BISULFATE 75 MG: 75 TABLET ORAL at 09:01

## 2020-12-01 RX ADMIN — Medication 10 ML: at 09:01

## 2020-12-01 RX ADMIN — ISOSORBIDE MONONITRATE 60 MG: 60 TABLET, EXTENDED RELEASE ORAL at 09:01

## 2020-12-01 RX ADMIN — INSULIN LISPRO 3 UNITS: 100 INJECTION, SOLUTION INTRAVENOUS; SUBCUTANEOUS at 09:01

## 2020-12-01 RX ADMIN — BUTALBITAL, ACETAMINOPHEN AND CAFFEINE 1 TABLET: 50; 325; 40 TABLET ORAL at 09:22

## 2020-12-01 RX ADMIN — GABAPENTIN 600 MG: 300 CAPSULE ORAL at 09:05

## 2020-12-01 RX ADMIN — PROMETHAZINE HYDROCHLORIDE 12.5 MG: 12.5 TABLET ORAL at 14:45

## 2020-12-01 RX ADMIN — ATORVASTATIN CALCIUM 40 MG: 40 TABLET, FILM COATED ORAL at 09:01

## 2020-12-01 RX ADMIN — ASPIRIN 325 MG ORAL TABLET 325 MG: 325 PILL ORAL at 09:01

## 2020-12-01 RX ADMIN — GABAPENTIN 600 MG: 300 CAPSULE ORAL at 13:17

## 2020-12-01 RX ADMIN — BUTALBITAL, ACETAMINOPHEN AND CAFFEINE 1 TABLET: 50; 325; 40 TABLET ORAL at 14:45

## 2020-12-01 RX ADMIN — INSULIN HUMAN 20 UNITS: 100 INJECTION, SUSPENSION SUBCUTANEOUS at 09:00

## 2020-12-01 RX ADMIN — INSULIN LISPRO 6 UNITS: 100 INJECTION, SOLUTION INTRAVENOUS; SUBCUTANEOUS at 13:15

## 2020-12-01 RX ADMIN — PROMETHAZINE HYDROCHLORIDE 12.5 MG: 12.5 TABLET ORAL at 09:21

## 2020-12-01 NOTE — NURSING NOTE
NP Asa Logan made aware of safe report filed on patient. Patient was being transferred from the chair to the bed by two nursing assistants, when he became weak in the legs. The NA's safely helped the patient sit in the floor until more help cold be gotten to transfer him. Patient was not injured and was safely transferred back to bed with the help of extra nurses. Bed alarm om. Fall safety precautions in place.

## 2020-12-01 NOTE — PROGRESS NOTES
Continued Stay Note   Brennan     Patient Name: Benson Mclean  MRN: [id number]  Today's Date: 12/1/2020    Admit Date: 11/27/2020    Discharge Plan     Row Name 12/01/20 1523       Plan    Plan  DC Plan: [facility] at d/c. Pre-cert approved 12/1. PASRR is approved.    Plan Comments  SW called pt's wife to provide update that pt would likely transfer to [facility] this evening. Wife is agreeable.     Phone communication only - no physical contact with patient or family.    Danielle Perry [facility], LSW    Office: [phone]  Cell: [phone]  Fax: [phone]  E-mail: [email or web address]

## 2020-12-01 NOTE — PLAN OF CARE
Problem: Adult Inpatient Plan of Care  Goal: Plan of Care Review  12/1/2020 1723 by Azucena Rivera, JANET  Outcome: Ongoing, Progressing   Goal Outcome Evaluation:  Plan of Care Reviewed With: patient  Progress: improving  Outcome Summary: pt participated fairly well with PT tx session.  pt slowly gaining strength to RLE.  pt was able to start taking steps with walker with knee only buckling once.  decreased sensation to rue and rle.  Pt has good rehab potential.  Recommend IP rehab at d/c.  PPE used:  mask, safety glasses and gloves

## 2020-12-01 NOTE — PROGRESS NOTES
Referring Provider: Mari Bernal MD    Reason for follow-up:  Status post CABG  Recent stroke  Hypertension  Diabetes  Possible MARSHA.  Rule out cardioembolic source.     Patient Care Team:  Samantha Zabala APRN as PCP - General  Samantha Zabala APRN as PCP - Family Medicine  Jose G Rm MD as Consulting Physician (Cardiology)    Subjective .  Feeling okay.     ROS    Since I have last seen, the patient has been without any chest discomfort ,shortness of breath, palpitations, dizziness or syncope.  Denies having any headache ,abdominal pain ,nausea, vomiting , diarrhea constipation, loss of weight or loss of appetite.  Denies having any excessive bruising ,hematuria or blood in the stool.    Review of all systems negative except as indicated    History  Past Medical History:   Diagnosis Date   • Appetite loss    • Carpal tunnel syndrome on left     carpal tunnel on left hand   • Diabetic neuropathy (CMS/HCC)    • DM2 (diabetes mellitus, type 2) (CMS/HCC)    • History of angina    • Hyperlipidemia    • Hypogonadism in male    • Obesity    • Sleep apnea    • Unsteady gait        Past Surgical History:   Procedure Laterality Date   • ANGIOPLASTY      X2   • APPENDECTOMY     • CARDIAC CATHETERIZATION  11/13/2015   • CARPAL TUNNEL RELEASE Left 04/29/2018    carpal tunnel- lt hand// other hand surgeries    • CATARACT EXTRACTION, BILATERAL  2002    Dr. Lux Acosta   • CHOLECYSTECTOMY     • COLON RESECTION  2005   • CORONARY ANGIOPLASTY     • CORONARY ANGIOPLASTY WITH STENT PLACEMENT  11/13/2015    PTCA stent to proximal in stent and mid to distal lad   • CORONARY ANGIOPLASTY WITH STENT PLACEMENT  09/16/2016    PTCA stent to mid lad and stent to vein graft to marginal   • CORONARY ARTERY BYPASS GRAFT  2005    @ [facility]   • CYST REMOVAL      cyst removed from scrotum   • FOOT SURGERY Right 07/17/2018   • FOOT SURGERY Left 02/04/2019   • TOE AMPUTATION Right     right toe removed D/T infected cut that went to the bone        Family History   Problem Relation Age of Onset   • Heart disease Mother    • Heart disease Father    • Diabetes Sister    • Heart disease Sister    • Diabetes Brother    • Mental illness Brother        Social History     Tobacco Use   • Smoking status: Current Every Day Smoker     Packs/day: 1.00     Types: Cigarettes   • Smokeless tobacco: Never Used   Substance Use Topics   • Alcohol use: No     Frequency: Never   • Drug use: Yes     Frequency: 1.0 times per week     Types: Marijuana     Comment: socially        Medications Prior to Admission   Medication Sig Dispense Refill Last Dose   • aspirin 325 MG tablet Take 325 mg by mouth Daily.   11/27/2020 at Unknown time   • atenolol (TENORMIN) 100 MG tablet Take 100 mg by mouth Daily.   11/26/2020 at Unknown time   • atorvastatin (LIPITOR) 40 MG tablet Take 40 mg by mouth Daily.   11/26/2020 at Unknown time   • bumetanide (BUMEX) 2 MG tablet Take 2 mg by mouth 2 (Two) Times a Day.   11/26/2020 at Unknown time   • clopidogrel (PLAVIX) 75 MG tablet Take 75 mg by mouth Daily.   11/26/2020 at Unknown time   • digoxin (LANOXIN) 125 MCG tablet Take 125 mcg by mouth Daily.   11/26/2020 at Unknown time   • DULoxetine (CYMBALTA) 60 MG capsule Take 60 mg by mouth Daily.   11/26/2020 at Unknown time   • enalapril (VASOTEC) 5 MG tablet Take 5 mg by mouth Daily.   11/26/2020 at Unknown time   • insulin NPH-insulin regular (humuLIN 70/30,novoLIN 70/30) (70-30) 100 UNIT/ML injection Inject 50 Units under the skin into the appropriate area as directed Daily.   Patient Taking Differently at Unknown time   • isosorbide mononitrate (IMDUR) 60 MG 24 hr tablet Take 60 mg by mouth 2 (Two) Times a Day.   11/26/2020 at Unknown time   • metFORMIN (GLUCOPHAGE) 500 MG tablet Take 500 mg by mouth 2 (Two) Times a Day With Meals.   11/26/2020 at Unknown time   • Omega-3 Fatty Acids (fish oil) 1000 MG capsule capsule Take 1,000 mg by mouth Daily.   11/26/2020 at Unknown time   • tiotropium  "(Spiriva HandiHaler) 18 MCG per inhalation capsule Place 1 capsule into inhaler and inhale Daily. 30 capsule 1 11/26/2020 at Unknown time   • gabapentin (NEURONTIN) 600 MG tablet Take 600 mg by mouth 3 (Three) Times a Day.   Unknown at Unknown time   • HYDROcodone-acetaminophen (NORCO)  MG per tablet Take 1 tablet by mouth 2 (Two) Times a Day As Needed for Moderate Pain .   Unknown at Unknown time       Allergies  Codeine    Scheduled Meds:aspirin, 325 mg, Oral, Daily  atenolol, 100 mg, Oral, Daily  atorvastatin, 40 mg, Oral, Daily  clopidogrel, 75 mg, Oral, Daily  digoxin, 125 mcg, Oral, Daily  DULoxetine, 60 mg, Oral, Daily  enalapril, 5 mg, Oral, Daily  gabapentin, 600 mg, Oral, Q8H  insulin lispro, 0-7 Units, Subcutaneous, TID AC  insulin NPH-insulin regular, 20 Units, Subcutaneous, Daily  isosorbide mononitrate, 60 mg, Oral, BID  sodium chloride, 10 mL, Intravenous, Q12H      Continuous Infusions:sodium chloride, 20 mL/hr      PRN Meds:.•  acetaminophen **OR** acetaminophen **OR** acetaminophen  •  aluminum-magnesium hydroxide-simethicone  •  butalbital-acetaminophen-caffeine  •  dextrose  •  dextrose  •  glucagon (human recombinant)  •  HYDROcodone-acetaminophen  •  insulin lispro **AND** insulin lispro  •  promethazine **OR** promethazine  •  sodium chloride  •  sodium chloride    Objective     VITAL SIGNS  Vitals:    11/30/20 1816 11/30/20 2229 12/01/20 0239 12/01/20 0620   BP: 177/68 176/67 168/66 156/71   BP Location: Right arm Right arm Right arm Right arm   Patient Position: Lying Lying Lying Lying   Pulse: 65 65 70 60   Resp: 14 14 15 15   Temp: 98.7 °F (37.1 °C) 99.2 °F (37.3 °C) 97.8 °F (36.6 °C) 98.3 °F (36.8 °C)   TempSrc: Oral Oral Oral Oral   SpO2: 95% 94% 97%    Weight:       Height:           Flowsheet Rows      First Filed Value   Admission Height  172.7 cm (68\") Documented at 11/27/2020 0801   Admission Weight  120 kg (265 lb) Documented at 11/27/2020 0801            Intake/Output " Summary (Last 24 hours) at 12/1/2020 0646  Last data filed at 12/1/2020 0620  Gross per 24 hour   Intake 720 ml   Output 1575 ml   Net -855 ml        TELEMETRY: Sinus rhythm premature ventricular contractions    Physical Exam:  The patient is alert, oriented and in no distress.  Vital signs as noted above.  Head and neck revealed no carotid bruits or jugular venous distention.  No thyromegaly or lymphadenopathy is present  Lungs clear.  No wheezing.  Breath sounds are normal bilaterally.  Heart normal first and second heart sounds.  No murmur. No precordial rub is present.  No gallop is present.  Abdomen soft and nontender.  No organomegaly is present.  Extremities with good peripheral pulses without any pedal edema.  Skin warm and dry.  Musculoskeletal system is grossly normal  CNS grossly normal except for mild right-sided weakness      Results Review:   I reviewed the patient's new clinical results.  Lab Results (last 24 hours)     Procedure Component Value Units Date/Time    POC Glucose Once [[id number]]  (Abnormal) Collected: 11/30/20 1931    Specimen: Blood Updated: 11/30/20 1932     Glucose 270 mg/dL      Comment: Serial Number: [id number]:  [id number]       POC Glucose Once [[id number]]  (Abnormal) Collected: 11/30/20 1611    Specimen: Blood Updated: 11/30/20 1653     Glucose 221 mg/dL      Comment: Serial Number: [id number]:  [id number]       POC Glucose Once [[id number]]  (Abnormal) Collected: 11/30/20 1116    Specimen: Blood Updated: 11/30/20 1132     Glucose 167 mg/dL      Comment: Serial Number: [id number]:  [id number]       POC Glucose Once [[id number]]  (Abnormal) Collected: 11/30/20 0741    Specimen: Blood Updated: 11/30/20 0742     Glucose 149 mg/dL      Comment: Serial Number: [id number]:  [id number]             Imaging Results (Last 24 Hours)     ** No results found for the last 24 hours. **      LAB RESULTS (LAST 7 DAYS)    CBC  Results from last 7 days   Lab Units  11/30/20 0413 11/28/20 0330 11/27/20  0810   WBC 10*3/mm3 7.10 8.70 7.80   RBC 10*6/mm3 3.58* 4.14 4.27   HEMOGLOBIN g/dL 10.9* 12.5* 12.9*   HEMATOCRIT % 32.9* 38.0 39.5   MCV fL 91.7 91.7 92.5   PLATELETS 10*3/mm3 220 260 250       BMP  Results from last 7 days   Lab Units 11/30/20 0413 11/28/20 0330 11/27/20  0809   SODIUM mmol/L 138 139 138   POTASSIUM mmol/L 4.2 4.3 4.5   CHLORIDE mmol/L 103 105 103   CO2 mmol/L 24.0 25.0 23.0   BUN mg/dL 38* 29* 38*   CREATININE mg/dL 1.36* 1.45* 1.63*   GLUCOSE mg/dL 144* 185* 212*   MAGNESIUM mg/dL 2.2 2.0  --    PHOSPHORUS mg/dL  --  3.2  --        CMP   Results from last 7 days   Lab Units 11/30/20 0413 11/28/20 0330 11/27/20  0809   SODIUM mmol/L 138 139 138   POTASSIUM mmol/L 4.2 4.3 4.5   CHLORIDE mmol/L 103 105 103   CO2 mmol/L 24.0 25.0 23.0   BUN mg/dL 38* 29* 38*   CREATININE mg/dL 1.36* 1.45* 1.63*   GLUCOSE mg/dL 144* 185* 212*   ALBUMIN g/dL  --  3.80 3.60   BILIRUBIN mg/dL  --  1.0 0.6   ALK PHOS U/L  --  55 57   AST (SGOT) U/L  --  10 11   ALT (SGPT) U/L  --  9 12         BNP        TROPONIN  Results from last 7 days   Lab Units 11/27/20  0809   TROPONIN T ng/mL 0.035*       CoAg  Results from last 7 days   Lab Units 11/27/20  0809   INR  0.97   APTT seconds 24.2       Creatinine Clearance  Estimated Creatinine Clearance: 65.1 mL/min (A) (by C-G formula based on SCr of 1.36 mg/dL (H)).    ABG  Results from last 7 days   Lab Units 11/29/20  1554   PH, ARTERIAL pH units 7.389   PCO2, ARTERIAL mm Hg 43.1   PO2 ART mm Hg 62.3*   O2 SATURATION ART % 91.1*   BASE EXCESS ART mmol/L 0.8       Radiology  Ct Head Without Contrast    Result Date: 11/29/2020  1. Evolution of the left PCA territory infarct involving the medial left occipital lobe. No evidence of hemorrhagic transformation. 2. Focal hypodensity within the left thalamus likely representing a lacunar infarct.  Electronically Signed By-Anselmo Vaz MD On:11/29/2020 5:24 PM This report was finalized on  [id number] by  Anselmo Vaz MD.              EKG                I personally viewed and interpreted the patient's EKG/Telemetry data: Sinus rhythm nonspecific ST-T wave changes    ECHOCARDIOGRAM:    Results for orders placed during the hospital encounter of 11/27/20   Adult Transesophageal Echo (MARSHA) W/ Cont if Necessary Per Protocol (Cardiology Department)    Narrative Date of study  11/30/2020     Procedure performed  Transesophageal echocardiogram.  Doppler color pulsed wave and continuous   wave Doppler study     Indications  Recent stroke.  Assess for cardioembolic episodes.     Anesthesia  Provided by anesthesiologist with intravenous diprivan.     Procedure  Patient was brought to outpatient cardiovascular area in a fasting state   and patient was given intravenous Diprivan by anesthesiologist.  MARSHA probe   was passed without difficulty.     Patient tolerated the procedure well.  No complications were noted.     Results  Technically satisfactory study.  Mitral valve is thickened with adequate opening motion.  Mild mitral   regurgitation is present.  Tricuspid valve is normal.  Aortic valve is tricuspid with adequate opening motion.  Minimal aortic   regurgitation is present.  Biatrial enlargement is present.  Left ventricle is enlarged with diffuse hypocontractility with ejection   fraction of 35 to 40%.  Smoking effect is present in the left atrium and left ventricle.  Left atrial appendage is free of clots except for presence of smoking   effect.  No pericardial effusion or intracardiac thrombus is seen.  Atrial septum is intact.  No PFO is present.  Aorta is normal.     Impression  Biatrial enlargement.  Smoking effect in the left atrium and left ventricle and left atrial   appendage.  Mild mitral and aortic regurgitation.  Left ventricle enlargement with diffuse hypocontractility with ejection   fraction of 35 to 40%.  ]]]]]]]]]]]]]]]]             STRESS MYOVIEW:    Cardiolite (Tc-99m Sestamibi)  stress test    CARDIAC CATHETERIZATION:            OTHER:         Assessment/Plan     Principal Problem:    Cerebrovascular accident (CVA) (CMS/[facility])  Active Problems:    Diabetes mellitus due to underlying condition without complication, without long-term current use of insulin (CMS/[facility])    Hx of CABG    Essential hypertension    Dyslipidemia    Closed fracture of seventh thoracic vertebra (CMS/[facility])    Other headache syndrome    Right hand weakness    Neurologic gait dysfunction        [[[[[[[[[[[[[[[[[[[[[[[[[    Impression  ==============  -Right-sided weakness with left MCA territory stroke.      -status post CABG 2005. status post stent to LAD 2009    Status post stent to LAD 11/13/2015  Status post stent to mid LAD and SVG to marginal branch 09/16/2016.     Cardiac catheterization 09/16/2016 revealed  Left ventricular inferior wall hypokinesis with ejection fraction of 40%.  Left main coronary artery is normal  Proximal LAD stent is patent.  95-99% mid LAD InStent restenoses  RCA and circumflex coronary arteries are chronically occluded.  Sherman is not a graft.  Lima is normal  Left subclavian artery is normal  SVG to marginal branch has proximal 90% disease.  It is a jump graft to 1st and 2nd marginal branches.  First marginal branch has been chronically occluded.  SVG to RCA is patent       -status post myocardial infarction 2000 and 2002 .     -compensated congestive heart failure     -history of intermittent atrial fibrillation-maintaining sinus rhythm    Transesophageal echocardiogram 11/30/2020.  Biatrial enlargement.  Smoking effect in the left atrium and left ventricle and left atrial appendage.  Mild mitral and aortic regurgitation.  Left ventricle enlargement with diffuse hypocontractility with ejection fraction of 35 to 40%.    Echocardiogram 11/27/2020 revealed left atrial enlargement left ventricle dysfunction with ejection fraction of 40%.  Negative bubble study.      -diabetes hypertension and  sleep apnea in history of renal dysfunction .  Recent BUN/creatinine 38/1.36    -status post colon surgery (partial colectomy) appendectomy cholecystectomy right 4th toe removal and carpal tunnel surgery      -continued smoking 1 pack per day -abstinence from smoking      -allergy to codeine.  ============   Plan  ================  Right-sided weakness with left MCA territory stroke.  Residual deficit involving the right side with weakness ataxia with visual changes.    Renal dysfunction  BUN/creatinine 38/1.36  EKG showed sinus rhythm first-degree AV block nonspecific ST-T changes.  Echocardiogram showed left atrial enlargement left ventricle dysfunction with ejection fraction of 40%.    Status post CABG  Patient is not having any angina pectoris or congestive heart failure.  Patient had last stent placement in September 2016.    Transesophageal echocardiogram 11/30/2020 revealed  Biatrial enlargement.  Smoking effect in the left atrium and left ventricle and left atrial appendage.  Mild mitral and aortic regurgitation.  Left ventricle enlargement with diffuse hypocontractility with ejection fraction of 35 to 40%.    Anticoagulation  Patient would benefit from anticoagulation with Coumadin or Eliquis etc. in addition to Plavix.  Timing of starting medications will depend on neurological ideations.    Medications were reviewed and updated.    Have discussed with attending nurse for coordination of care.    Further plan will depend on patient's progress.      Jose G Rm MD  12/01/20  06:46 EST

## 2020-12-01 NOTE — CONSULTS
Nutrition Services    Patient Name:  Benson Mclean  YOB: 1950  MRN: [id number]  Admit Date:  11/27/2020 12/1/20: RD provided A1c education to patient via mail per patient request. Information included Myplate diagram, Carb counting examples and starchy vs non starchy vegetables examples. Along with 5 easy steps to managing blood sugar.    Electronically signed by:  Ruma Camacho RD  12/01/20 15:18 EST

## 2020-12-01 NOTE — PROGRESS NOTES
Continued Stay Note   [place]     Patient Name: Benson Mclean  MRN: [id number]  Today's Date: 12/1/2020    Admit Date: 11/27/2020    Discharge Plan     Row Name 12/01/20 1309       Plan    Plan  DC Plan: [facility] at d/c. Pre-cert approved 12/1. PASRR is approved.    Plan Comments  Per text from [facility], pt is approved to go to [facility]. SW notified RN, MD, and CM via secure chat. SW completed PASRR and it is approved.        Phone communication only - no physical contact with patient or family.    Danielle Perry, [facility], [facility]    Office: [phone]  Cell: [phone]  Fax: [phone]  E-mail: [email or web address]

## 2020-12-01 NOTE — THERAPY TREATMENT NOTE
Patient Name: Benson Mclean  : 1950    MRN: [id number]                              Today's Date: 2020       Admit Date: 2020    Visit Dx:     ICD-10-CM ICD-9-CM   1. Cerebrovascular accident (CVA), unspecified mechanism (CMS/HCC)  I63.9 434.91   2. Nonintractable headache, unspecified chronicity pattern, unspecified headache type  R51.9 784.0   3. Closed fracture of seventh thoracic vertebra, unspecified fracture morphology, initial encounter (CMS/HCC)  S22.069A 805.2   4. Other headache syndrome  G44.89 339.89     Patient Active Problem List   Diagnosis   • Diabetes mellitus due to underlying condition without complication, without long-term current use of insulin (CMS/[facility])   • Hx of CABG   • Essential hypertension   • Dyslipidemia   • Fall   • Closed fracture of seventh thoracic vertebra (CMS/[facility])   • Cerebrovascular accident (CVA) (CMS/[facility])   • Other headache syndrome   • Right hand weakness   • Neurologic gait dysfunction     Past Medical History:   Diagnosis Date   • Appetite loss    • Carpal tunnel syndrome on left     carpal tunnel on left hand   • Diabetic neuropathy (CMS/[facility])    • DM2 (diabetes mellitus, type 2) (CMS/HCC)    • History of angina    • Hyperlipidemia    • Hypogonadism in male    • Obesity    • Sleep apnea    • Unsteady gait      Past Surgical History:   Procedure Laterality Date   • ANGIOPLASTY      X2   • APPENDECTOMY     • CARDIAC CATHETERIZATION  2015   • CARPAL TUNNEL RELEASE Left 2018    carpal tunnel- lt hand// other hand surgeries    • CATARACT EXTRACTION, BILATERAL      Dr. Lux Acosta   • CHOLECYSTECTOMY     • COLON RESECTION     • CORONARY ANGIOPLASTY     • CORONARY ANGIOPLASTY WITH STENT PLACEMENT  2015    PTCA stent to proximal in stent and mid to distal lad   • CORONARY ANGIOPLASTY WITH STENT PLACEMENT  2016    PTCA stent to mid lad and stent to vein graft to marginal   • CORONARY ARTERY BYPASS GRAFT      @ [facility]   • CYST  REMOVAL      cyst removed from scrotum   • FOOT SURGERY Right 07/17/2018   • FOOT SURGERY Left 02/04/2019   • TOE AMPUTATION Right     right toe removed D/T infected cut that went to the bone     General Information     Row Name 12/01/20 1700          Physical Therapy Time and Intention    Document Type  therapy note (daily note)  -SC     Mode of Treatment  physical therapy;individual therapy  -SC     Row Name 12/01/20 1700          General Information    Existing Precautions/Restrictions  fall  -SC     Row Name 12/01/20 1700          Cognition    Orientation Status (Cognition)  oriented x 3  -SC     Row Name 12/01/20 1700          Safety Issues, Functional Mobility    Safety Issues Affecting Function (Mobility)  insight into deficits/self-awareness;awareness of need for assistance  -SC     Impairments Affecting Function (Mobility)  endurance/activity tolerance;strength;shortness of breath;postural/trunk control  -SC     Cognitive Impairments, Mobility Safety/Performance  attention;insight into deficits/self-awareness;judgment;problem-solving/reasoning  -SC       User Key  (r) = Recorded By, (t) = Taken By, (c) = Cosigned By    Initials Name Provider Type    SC Azucena Rivera PTA Physical Therapy Assistant        Mobility     Row Name 12/01/20 1713          Bed Mobility    Bed Mobility  sit-supine  -SC     Supine-Sit [place] (Bed Mobility)  minimum assist (75% patient effort);1 person assist  -SC     Sit-Supine [place] (Bed Mobility)  not tested  -SC     Assistive Device (Bed Mobility)  bed rails;head of bed elevated;draw sheet  -SC     Row Name 12/01/20 1713          Transfers    Comment (Transfers)  sit to stand from eob.  pt needs time to work on posture and balance.  eob to bs comode with mod x's 2.  sit to stand from bs come to bs chair.  -SC     Row Name 12/01/20 1713          Bed-Chair Transfer    Bed-Chair [place] (Transfers)  moderate assist (50% patient effort);2 person assist  -SC      Assistive Device (Bed-Chair Transfers)  walker, front-wheeled  -SC     Row Name 12/01/20 1713          Sit-Stand Transfer    Sit-Stand [place] (Transfers)  moderate assist (50% patient effort);2 person assist  -SC     Assistive Device (Sit-Stand Transfers)  walker, front-wheeled  -SC     Row Name 12/01/20 1713          Gait/Stairs (Locomotion)    [place] Level (Gait)  minimum assist (75% patient effort);2 person assist  -SC     Assistive Device (Gait)  walker, front-wheeled  -SC     Distance in Feet (Gait)  4 steps to bs comode and the 4 steps to bs chair.  -SC     Deviations/Abnormal Patterns (Gait)  stride length decreased;gait speed decreased  -SC     Right Sided Gait Deviations  knee buckling, right side;foot drop/toe drag poor foot clearance during step to gait pattern,  but right knee buckled only once  -SC     Comment (Gait/Stairs)  pt needed assist to right hand to stay on roll walker  -SC       User Key  (r) = Recorded By, (t) = Taken By, (c) = Cosigned By    Initials Name Provider Type    Azucena Phoenix, JANET Physical Therapy Assistant        Obj/Interventions     Row Name 12/01/20 1717          Balance    Balance Assessment  sitting static balance;sitting dynamic balance;standing static balance;standing dynamic balance  -SC     Static Sitting Balance  WFL;sitting, edge of bed  -SC     Dynamic Sitting Balance  mild impairment;sitting, edge of bed  -SC     Static Standing Balance  moderate impairment;supported with roll walker  -SC     Dynamic Standing Balance  moderate impairment;supported with roll walker  -SC       User Key  (r) = Recorded By, (t) = Taken By, (c) = Cosigned By    Initials Name Provider Type    Azucena Phoenix PTA Physical Therapy Assistant        Goals/Plan    No documentation.       Clinical Impression     Row Name 12/01/20 1718          Pain    Additional Documentation  Pain Scale: FACES Pre/Post-Treatment (Group)  -SC     Row Name 12/01/20 0024          Pain  Scale: Numbers Pre/Post-Treatment    Pain Intervention(s)  Rest;Repositioned;Elevated  -[facility] Name 12/01/20 1718          Pain Scale: FACES Pre/Post-Treatment    Pain: FACES Scale, Pretreatment  0-->no hurt  -SC     Posttreatment Pain Rating  4-->hurts little more  -SC     Pain Location - Side  Right  -SC     Pain Location  shoulder  -[facility] Name 12/01/20 1718          Therapy Assessment/Plan (PT)    Rehab Potential (PT)  good, to achieve stated therapy goals  -SC     Criteria for Skilled Interventions Met (PT)  skilled treatment is necessary  -[facility] Name 12/01/20 1718          Vital Signs    O2 Delivery Pre Treatment  supplemental O2  -SC     O2 Delivery Intra Treatment  supplemental O2  -SC     O2 Delivery Post Treatment  supplemental O2  -[facility] Name 12/01/20 1718          Positioning and Restraints    Pre-Treatment Position  in bed  -SC     Post Treatment Position  chair  -SC     In Chair  notified nsg;reclined;call light within reach;encouraged to call for assist;exit alarm on  -SC       User Key  (r) = Recorded By, (t) = Taken By, (c) = Cosigned By    Initials Name Provider Type    Azucena Phoenix PTA Physical Therapy Assistant        Outcome Measures     [facility] Name 12/01/20 1719          Modified [place] Scale    Modified [place] Scale  4 - Moderately severe disability.  Unable to walk without assistance, and unable to attend to own bodily needs without assistance.  -[facility] Name 12/01/20 1719          Functional Assessment    Outcome Measure Options  Modified [place]  -SC       User Key  (r) = Recorded By, (t) = Taken By, (c) = Cosigned By    Initials Name Provider Type    Azucena Phoenix PTA Physical Therapy Assistant        Physical Therapy Education                 Title: PT OT SLP Therapies (In Progress)     Topic: Physical Therapy (Done)     Point: Mobility training (Done)     Learning Progress Summary           Patient Acceptance, E, VU,NR by SC at 12/1/2020 0857     Acceptance, E,D, NR,DU by  at 11/30/2020 1348    Acceptance, E, NR by  at 11/30/2020 0835    Acceptance, E, VU by AB at 11/29/2020 1632    Acceptance, E, NR by  at 11/28/2020 1011                   Point: Home exercise program (Done)     Learning Progress Summary           Patient Acceptance, E,D, NR,DU by  at 11/30/2020 1348                   Point: Body mechanics (Done)     Learning Progress Summary           Patient Acceptance, E, VU,NR by SC at 12/1/2020 1720    Acceptance, E,D, NR,DU by  at 11/30/2020 1348                   Point: Precautions (Done)     Learning Progress Summary           Patient Acceptance, E, VU,NR by SC at 12/1/2020 1720    Acceptance, E,D, NR,DU by  at 11/30/2020 1348    Acceptance, E, NR by  at 11/30/2020 0835    Acceptance, E, NR by  at 11/28/2020 1011                               User Key     Initials Effective Dates Name Provider Type Discipline     03/01/19 -  Maranda Stephen, PT Physical Therapist PT    SC 03/01/19 -  Azucena Rivera PTA Physical Therapy Assistant PT     03/01/19 -  Carolin Dodd, RN Registered Nurse Nurse     01/07/20 -  Bhakti Louise, DIPIKA Physical Therapist PT    AB 10/14/20 -  Sahra Carpenter, RN Registered Nurse Nurse              PT Recommendation and Plan     Plan of Care Reviewed With: patient  Progress: improving  Outcome Summary: pt participated fairly well with PT tx session.  pt slowly gaining strength to RLE.  pt was able to start taking steps with walker with knee only buckling once.  decreased sensation to rue and rle.  Pt has good rehab potential.  Recommend IP rehab at d/c.  PPE used:  mask, safety glasses and gloves     Time Calculation:   PT Charges     Row Name 12/01/20 1723             Time Calculation    Start Time  1100  -SC      Stop Time  1139  -SC      Time Calculation (min)  39 min  -SC      PT Received On  12/01/20  -SC      PT - Next Appointment  12/02/20  -SC         Time Calculation- PT    Total Timed Code  Minutes- PT  39 minute(s)  -SC         Timed Charges    46650 -  PT Neuromuscular Reeducation Minutes  14  -SC      33827 - PT Therapeutic Activity Minutes  25  -SC        User Key  (r) = Recorded By, (t) = Taken By, (c) = Cosigned By    Initials Name Provider Type    Azucena Phoenix PTA Physical Therapy Assistant        Therapy Charges for Today     Code Description Service Date Service Provider Modifiers Qty    02571897327 HC PT NEUROMUSC RE EDUCATION EA 15 MIN 12/1/2020 Azucena Rivera, JANET GP 1    97519090039 HC PT THERAPEUTIC ACT EA 15 MIN 12/1/2020 Azucena Rivera PTA GP 2          PT G-Codes  Outcome Measure Options: Modified [place]  Modified [place] Scale: 4 - Moderately severe disability.  Unable to walk without assistance, and unable to attend to own bodily needs without assistance.    Azucena Rivera PTA  12/1/2020

## 2020-12-01 NOTE — PLAN OF CARE
Goal Outcome Evaluation:  Plan of Care Reviewed With: patient  Progress: improving   Patient to discharge to [facility] and rehab with EMS staff to transport. Discharge information/education sent to facility (report to Nando RN) and wife Melanie notified.

## 2020-12-01 NOTE — PLAN OF CARE
Problem: Adult Inpatient Plan of Care  Goal: Plan of Care Review  Outcome: Ongoing, Progressing  Goal: Patient-Specific Goal (Individualized)  Outcome: Ongoing, Progressing  Goal: Absence of Hospital-Acquired Illness or Injury  Outcome: Ongoing, Progressing  Intervention: Identify and Manage Fall Risk  Recent Flowsheet Documentation  Taken 12/1/2020 0400 by Hilda Stauffer RN  Safety Promotion/Fall Prevention:   safety round/check completed   nonskid shoes/slippers when out of bed   fall prevention program maintained   clutter free environment maintained   assistive device/personal items within reach   activity supervised  Taken 12/1/2020 0048 by Hilda Stauffer RN  Safety Promotion/Fall Prevention:   safety round/check completed   room organization consistent   nonskid shoes/slippers when out of bed   lighting adjusted   fall prevention program maintained   elopement precautions   clutter free environment maintained  Taken 11/30/2020 2030 by Hilda Stauffer RN  Safety Promotion/Fall Prevention:   safety round/check completed   room organization consistent   nonskid shoes/slippers when out of bed   gait belt   clutter free environment maintained   assistive device/personal items within reach   activity supervised   fall prevention program maintained   lighting adjusted  Intervention: Prevent Skin Injury  Recent Flowsheet Documentation  Taken 12/1/2020 0400 by Hilda Stauffer RN  Body Position:   lower extremity elevated, left   lower extremity elevated, right  Taken 12/1/2020 0048 by Hilda Stauffer RN  Body Position:   lower extremity elevated, left   lower extremity elevated, right  Taken 11/30/2020 2030 by Hilda Stauffer RN  Body Position:   upper extremity elevated, left   upper extremity elevated, right  Intervention: Prevent and Manage VTE (venous thromboembolism) Risk  Recent Flowsheet Documentation  Taken 12/1/2020 0400 by Hilda Stauffer RN  VTE Prevention/Management:   bilateral   sequential compression devices on  Intervention:  Prevent Infection  Recent Flowsheet Documentation  Taken 12/1/2020 0400 by Hilda Stauffer RN  Infection Prevention:   visitors restricted/screened   single patient room provided   rest/sleep promoted   personal protective equipment utilized   hand hygiene promoted  Taken 12/1/2020 0048 by Hilda Stauffer RN  Infection Prevention:   visitors restricted/screened   single patient room provided   rest/sleep promoted   personal protective equipment utilized   hand hygiene promoted  Taken 11/30/2020 2030 by Hilda Stauffer RN  Infection Prevention:   visitors restricted/screened   single patient room provided   rest/sleep promoted   personal protective equipment utilized   hand hygiene promoted  Goal: Optimal Comfort and Wellbeing  Outcome: Ongoing, Progressing  Intervention: Provide Person-Centered Care  Recent Flowsheet Documentation  Taken 11/30/2020 2030 by Hilda Stauffer RN  Trust Relationship/Rapport:   care explained   questions answered   questions encouraged  Goal: Readiness for Transition of Care  Outcome: Ongoing, Progressing     Problem: Adjustment to Illness (Stroke, Ischemic/Transient Ischemic Attack)  Goal: Optimal Coping  Outcome: Ongoing, Progressing  Intervention: Support Patient/Family Psychosocial Response to Stroke  Recent Flowsheet Documentation  Taken 11/30/2020 2030 by Hilda Stauffer RN  Supportive Measures:   self-care encouraged   active listening utilized   verbalization of feelings encouraged     Problem: Bowel Elimination Impaired (Stroke, Ischemic/Transient Ischemic Attack)  Goal: Effective Bowel Elimination  Outcome: Ongoing, Progressing     Problem: Cerebral Tissue Perfusion Risk (Stroke, Ischemic/Transient Ischemic Attack)  Goal: Optimal Cerebral Tissue Perfusion  Outcome: Ongoing, Progressing     Problem: Communication Impairment (Stroke, Ischemic/Transient Ischemic Attack)  Goal: Improved Communication Skills  Outcome: Ongoing, Progressing  Intervention: Optimize Cognitive and Communication  Skills  Recent Flowsheet Documentation  Taken 11/30/2020 2030 by Hilda Stauffer RN  Communication Enhancement Strategies:   repetition utilized   call light answered in person   family involved in communication plan     Problem: Eating/Swallowing Impairment (Stroke, Ischemic/Transient Ischemic Attack)  Goal: Oral Intake without Aspiration  Outcome: Ongoing, Progressing     Problem: Functional Ability Impaired (Stroke, Ischemic/Transient Ischemic Attack)  Goal: Optimal Functional Ability  Outcome: Ongoing, Progressing  Intervention: Optimize Functional Ability  Recent Flowsheet Documentation  Taken 12/1/2020 0048 by Hilda Stauffer RN  Activity Management:   bedrest   activity adjusted per tolerance     Problem: Hemodynamic Instability (Stroke, Ischemic/Transient Ischemic Attack)  Goal: Vital Signs Remain in Desired Range  Outcome: Ongoing, Progressing  Intervention: Optimize Blood Flow  Recent Flowsheet Documentation  Taken 11/30/2020 2030 by Hilda Stauffer RN  Fluid/Electrolyte Management: fluids provided     Problem: Pain (Stroke, Ischemic/Transient Ischemic Attack)  Goal: Acceptable Pain Control  Outcome: Ongoing, Progressing     Problem: Sensorimotor Impairment (Stroke, Ischemic/Transient Ischemic Attack)  Goal: Improved Sensorimotor Function  Outcome: Ongoing, Progressing  Intervention: Optimize Sensory and Perceptual Abilities  Recent Flowsheet Documentation  Taken 12/1/2020 0400 by Hilda Stauffer RN  Pressure Reduction Techniques:   other (see comments)   frequent weight shift encouraged   pressure points protected   rest period provided between sit times  Pressure Reduction Devices: pressure-redistributing mattress utilized  Taken 12/1/2020 0048 by Hilda Stauffer RN  Pressure Reduction Techniques:   weight shift assistance provided   frequent weight shift encouraged   heels elevated off bed   rest period provided between sit times  Pressure Reduction Devices: pressure-redistributing mattress utilized  Taken 11/30/2020 2030  by Eh, Hilda, RN  Pressure Reduction Techniques:   weight shift assistance provided   rest period provided between sit times   frequent weight shift encouraged  Pressure Reduction Devices: pressure-redistributing mattress utilized     Problem: Urinary Elimination Impaired (Stroke, Ischemic/Transient Ischemic Attack)  Goal: Effective Urinary Elimination  Outcome: Ongoing, Progressing     Problem: Fall Injury Risk  Goal: Absence of Fall and Fall-Related Injury  Outcome: Ongoing, Progressing  Intervention: Identify and Manage Contributors to Fall Injury Risk  Recent Flowsheet Documentation  Taken 11/30/2020 2030 by Hilda Stauffer RN  Medication Review/Management: medications reviewed  Intervention: Promote Injury-Free Environment  Recent Flowsheet Documentation  Taken 12/1/2020 0400 by Hilda Stauffer RN  Safety Promotion/Fall Prevention:   safety round/check completed   nonskid shoes/slippers when out of bed   fall prevention program maintained   clutter free environment maintained   assistive device/personal items within reach   activity supervised  Taken 12/1/2020 0048 by Hilda Stauffer RN  Safety Promotion/Fall Prevention:   safety round/check completed   room organization consistent   nonskid shoes/slippers when out of bed   lighting adjusted   fall prevention program maintained   elopement precautions   clutter free environment maintained  Taken 11/30/2020 2030 by Hilda Stauffer RN  Safety Promotion/Fall Prevention:   safety round/check completed   room organization consistent   nonskid shoes/slippers when out of bed   gait belt   clutter free environment maintained   assistive device/personal items within reach   activity supervised   fall prevention program maintained   lighting adjusted   Goal Outcome Evaluation:  Plan of Care Reviewed With: patient  Progress: no change

## 2020-12-01 NOTE — DISCHARGE SUMMARY
[facility] Medicine Services  DISCHARGE SUMMARY        Prepared For PCP:  Samantha Zabala APRN    Patient Name: Benson Mclean  : 1950  MRN: [id number]      Date of Admission:   2020    Date of Discharge:  2020    Length of stay:  LOS: 3 days     Hospital Course     Presenting Problem:   Nonintractable headache, unspecified chronicity pattern, unspecified headache type [R51.9]  Cerebrovascular accident (CVA), unspecified mechanism (CMS/HCC) [I63.9]  Cerebrovascular accident (CVA), unspecified mechanism (CMS/HCC) [I63.9]      Active Hospital Problems    Diagnosis  POA   • **Cerebrovascular accident (CVA) (CMS/HCC) [I63.9]  Yes   • Other headache syndrome [G44.89]  Yes   • Right hand weakness [R29.898]  Yes   • Neurologic gait dysfunction [R26.9]  Yes   • Closed fracture of seventh thoracic vertebra (CMS/HCC) [S22.069A]  Yes   • Hx of CABG [Z95.1]  Not Applicable   • Essential hypertension [I10]  Yes   • Dyslipidemia [E78.5]  Yes   • Diabetes mellitus due to underlying condition without complication, without long-term current use of insulin (CMS/HCC) [E08.9]  Yes      Resolved Hospital Problems   No resolved problems to display.           Hospital Course:  Benson Mclean is a 70 y.o. male with history of memory deficit, chronic gait dysfunction with walker use, chronic back pain,   hypertension, hyperlipidemia, IDDM with peripheral neuropathy, CKD stage III and ANNETTE on CPAP.     Apparently the patient wokehis wife up in the morning of 20 complaining of inability to get out of bed.  She described him as being confused and unable to follow commands.  She helped him get out of bed and she noticed that he had slurred speech and right hand weakness.  The patient was unable to clean himself in the bathroom and unable to stand and walk therefore his wife helped him walk out of the bathroom.  The patient also complained of dry mouth.  The patient went to sleep around 12:30  AM and has been complaining of headache for several days.  The patient has been sleeping more than usual and having intermittent right upper extremity tremors for several months and his PCP had recently increased his antidepressive medication.     In the ED, the patient has undergone CT of head without contrast, CTA of head and neck.  COVID-19 was ruled out in the ED.     The patient was recently hospitalized between 8/23/20 and 8/24/20 after a fall.  He had sustained nondisplaced fracture involving anterior T6-T7 and was evaluated by neurosurgery during that hospitalization and was prescribed back brace.    The patient's acute and chronic medical problems were managed as outlined below:    Acute CVA  Right-sided UE weakness and dysarthria  -s/p CT head and CTA head/neck  -MRI brain--> left PCA distribution infarct involving left occipital lobe and dorsal aspect of the left thalamus.  Remote lacunar infarction of right cerebellum.  -Neurology following  -s/p TTE 11/27/20-->LVEF 40%. NO PFO     -Consulted cardiology for work-up of cardioembolic stroke.  Keep n.p.o. Sunday midnight for cardiology, Dr. Sujey ryan 11/30/2020     Frontal headache  -Fioricet and Phenergan ordered  -Repeat CT of the head ordered 11/29/20  -ABG ordered  --will ask Neurology to re-evaluate      Intermittent atrial fibrillation  -Sinus rhythm with first-degree AV block  -Atenolol, digoxin  -We will start anticoagulation when okay with neurology     Chronic back pain  -- recent nondisplaced fracture involving anterior T6-T7  -Pain mostly controlled  -- continue gabapentin     Memory loss  -Chronic     CAD   -- stents (2009/2015) s/p CABG (2005):  -on aspirin, plavix, atenolol  -Follows with Dr. Rm     IDDM with peripheral neuropathy  -On Neurontin at home  -Continue NPH and ISS     Essential hypertension  -Atenolol, Imdur, enalapril  -- Monitor BP and vitals     Hyperlipidemia  -Continue atorvastatin     ANNETTE  -Compliant with  CPAP     Depression  -- continue duloxetine      COPD  -Continue bronchodilators  -Active smoker    The patient is discharged in good condition to [facility] with instructions for follow-up appointments as listed below.      Day of Discharge     HPI: Patient states no new complaints at this time.      Vital Signs:   Temp:  [97.8 °F (36.6 °C)-99.2 °F (37.3 °C)] 97.8 °F (36.6 °C)  Heart Rate:  [52-70] 52  Resp:  [14-16] 16  BP: (133-177)/(52-71) 152/68     Physical Exam:  General: well-developed and well-nourished, obese, NAD  HEENT: NC/AT, EOMI, PERRLA  Heart: RRR. No murmur   Chest: CTAB, no w/r/r, normal respiratory effort  Abdominal: Soft. NT/ND. Bowel sounds present  Musculoskeletal: RUE/RLE decreased ROM.  No edema. No calf tenderness.  Neurological: AAOx3, right-sided residual weakness.  Dysarthria.  Skin: Skin is warm and dry. No rash  Psychiatric: Normal mood and affect.    Pertinent  and/or Most Recent Results     Results from last 7 days   Lab Units 11/30/20  0413 11/28/20  0330 11/27/20  0810 11/27/20  0809   WBC 10*3/mm3 7.10 8.70 7.80  --    HEMOGLOBIN g/dL 10.9* 12.5* 12.9*  --    HEMATOCRIT % 32.9* 38.0 39.5  --    PLATELETS 10*3/mm3 220 260 250  --    SODIUM mmol/L 138 139  --  138   POTASSIUM mmol/L 4.2 4.3  --  4.5   CHLORIDE mmol/L 103 105  --  103   CO2 mmol/L 24.0 25.0  --  23.0   BUN mg/dL 38* 29*  --  38*   CREATININE mg/dL 1.36* 1.45*  --  1.63*   GLUCOSE mg/dL 144* 185*  --  212*   CALCIUM mg/dL 8.2* 8.8  --  8.9     Results from last 7 days   Lab Units 11/28/20  0330 11/27/20  0809   BILIRUBIN mg/dL 1.0 0.6   ALK PHOS U/L 55 57   ALT (SGPT) U/L 9 12   AST (SGOT) U/L 10 11   PROTIME Seconds  --  10.7   INR   --  0.97   APTT seconds  --  24.2     Results from last 7 days   Lab Units 11/27/20  1443   CHOLESTEROL mg/dL 165   TRIGLYCERIDES mg/dL 198*   HDL CHOL mg/dL 42     Results from last 7 days   Lab Units 11/28/20  0330 11/27/20  0809   TSH uIU/mL 1.510  --    HEMOGLOBIN  A1C % 8.6*  --    TROPONIN T ng/mL  --  0.035*       Brief Urine Lab Results     None          Microbiology Results Abnormal     Procedure Component Value - Date/Time    COVID-19, [facility]BBOTT IN-HOUSE,NP Swab (NO TRANSPORT MEDIA) 2 HR TAT - Swab, Nasopharynx [[id number]]  (Normal) Collected: 11/27/20 0950    Lab Status: Final result Specimen: Swab from Nasopharynx Updated: 11/27/20 1011     COVID19 Not Detected    Narrative:      Fact sheet for providers: https://www.fda.gov/media/659936/download     Fact sheet for patients: https://www.fda.gov/media/153235/download          Ct Angiogram Head    Result Date: 11/27/2020  Impression: 1.No evidence for arterial occlusion throughout the head or neck. There is no evidence for arterial dissection. 2.Moderate atherosclerosis is noted throughout the common carotid arteries, carotid bulbs, and proximal internal carotid arteries bilaterally. 3.Evidence for 70% stenosis at the origin of the left ICA. 4.Evidence for 45% stenosis at the origin of the right ICA. 5.Evidence for 30% stenosis at the proximal right common carotid artery just distal to the origin. 6.Evidence of pulmonary nodules within the upper lobes. These findings are incompletely evaluated. Recommend correlation with follow-up nonemergent CT of the chest for better characterization.  Electronically Signed By-Yony Thomason MD On:11/27/2020 10:00 AM This report was finalized on [id number] by  Yony Thomason MD.    Ct Head Without Contrast    Result Date: 11/29/2020  Impression: 1. Evolution of the left PCA territory infarct involving the medial left occipital lobe. No evidence of hemorrhagic transformation. 2. Focal hypodensity within the left thalamus likely representing a lacunar infarct.  Electronically Signed By-Anselmo Vaz MD On:11/29/2020 5:24 PM This report was finalized on [id number] by  Anselmo Vaz MD.    Ct Angiogram Neck    Result Date: 11/27/2020  Impression: 1.No evidence for arterial  occlusion throughout the head or neck. There is no evidence for arterial dissection. 2.Moderate atherosclerosis is noted throughout the common carotid arteries, carotid bulbs, and proximal internal carotid arteries bilaterally. 3.Evidence for 70% stenosis at the origin of the left ICA. 4.Evidence for 45% stenosis at the origin of the right ICA. 5.Evidence for 30% stenosis at the proximal right common carotid artery just distal to the origin. 6.Evidence of pulmonary nodules within the upper lobes. These findings are incompletely evaluated. Recommend correlation with follow-up nonemergent CT of the chest for better characterization.  Electronically Signed By-Yony Thomason MD On:11/27/2020 10:00 AM This report was finalized on [id number] by  Yony Thomason MD.    Mri Brain Without Contrast    Result Date: 11/27/2020  Impression: 1. Acute left PCA distribution infarct involving the left occipital lobe and dorsal aspect of the left thalamus. 2. Encephalomalacia involving the left parietal-occipital region related to remote infarct. 3. Small area of remote lacunar infarction involving the right cerebellum. 4. Generalized cerebral atrophy.  I called the findings to the patient's nurse in the [facility], Zahra, at 8:50 PM on 11/27/2020  Electronically Signed By-Dony Tinajero MD On:11/27/2020 8:59 PM This report was finalized on [id number] by  Dony Tinajero MD.    Xr Chest 1 View    Result Date: 11/27/2020  Impression: Limited study demonstrating hazy groundglass opacities and interstitial thickening in the lung bases, which could represent chronic lung disease, mild pulmonary vascular congestion/pulmonary edema, or atypical pneumonia.  Electronically Signed By-Florencio Melgar MD On:11/27/2020 9:18 AM This report was finalized on [id number] by  Florencio Melgar MD.    Ct Head Without Contrast Stroke Protocol    Result Date: 11/27/2020  Impression: IMPRESSION :  1. No acute intracranial hemorrhage or mass/mass effect. 2. Stable  left parieto-occipital and right cerebellar encephalomalacia from old infarcts. 3. Patchy periventricular and subcortical white matter low-attenuation, statistically representing age-indeterminate small vessel ischemic changes.  Electronically Signed By-Florencio Melgar MD On:11/27/2020 8:15 AM This report was finalized on [id number] by  Florencio Melgar MD.      Results for orders placed during the hospital encounter of 11/27/20   Adult Transesophageal Echo (MARSHA) W/ Cont if Necessary Per Protocol (Cardiology Department)    Narrative Date of study  11/30/2020     Procedure performed  Transesophageal echocardiogram.  Doppler color pulsed wave and continuous   wave Doppler study     Indications  Recent stroke.  Assess for cardioembolic episodes.     Anesthesia  Provided by anesthesiologist with intravenous diprivan.     Procedure  Patient was brought to outpatient cardiovascular area in a fasting state   and patient was given intravenous Diprivan by anesthesiologist.  MARSHA probe   was passed without difficulty.     Patient tolerated the procedure well.  No complications were noted.     Results  Technically satisfactory study.  Mitral valve is thickened with adequate opening motion.  Mild mitral   regurgitation is present.  Tricuspid valve is normal.  Aortic valve is tricuspid with adequate opening motion.  Minimal aortic   regurgitation is present.  Biatrial enlargement is present.  Left ventricle is enlarged with diffuse hypocontractility with ejection   fraction of 35 to 40%.  Smoking effect is present in the left atrium and left ventricle.  Left atrial appendage is free of clots except for presence of smoking   effect.  No pericardial effusion or intracardiac thrombus is seen.  Atrial septum is intact.  No PFO is present.  Aorta is normal.     Impression  Biatrial enlargement.  Smoking effect in the left atrium and left ventricle and left atrial   appendage.  Mild mitral and aortic regurgitation.  Left ventricle  enlargement with diffuse hypocontractility with ejection   fraction of 35 to 40%.  ]]]]]]]]]]]]]]]]         Test Results Pending at Discharge        Procedures Performed      Consults:   Consults     Date and Time Order Name Status Description    11/27/2020 1636 Inpatient Cardiology Consult      11/27/2020 1026 Hospitalist (on-call MD unless specified) Completed     11/27/2020 0800 Inpatient Neurology Consult Stroke Completed     11/27/2020 0800 Inpatient Neurology Consult Stroke Completed             Discharge Details        Discharge Medications      New Medications      Instructions Start Date   butalbital-acetaminophen-caffeine -40 MG per tablet  Commonly known as: FIORICET, ESGIC   1 tablet, Oral, Every 4 Hours PRN         Continue These Medications      Instructions Start Date   aspirin 325 MG tablet   325 mg, Oral, Daily      atenolol 100 MG tablet  Commonly known as: TENORMIN   100 mg, Oral, Daily      atorvastatin 40 MG tablet  Commonly known as: LIPITOR   40 mg, Oral, Daily      bumetanide 2 MG tablet  Commonly known as: BUMEX   2 mg, Oral, 2 Times Daily      clopidogrel 75 MG tablet  Commonly known as: PLAVIX   75 mg, Oral, Daily      digoxin 125 MCG tablet  Commonly known as: LANOXIN   125 mcg, Oral, Daily Digoxin      DULoxetine 60 MG capsule  Commonly known as: CYMBALTA   60 mg, Oral, Daily      enalapril 5 MG tablet  Commonly known as: VASOTEC   5 mg, Oral, Daily      fish oil 1000 MG capsule capsule   1,000 mg, Oral, Daily      HYDROcodone-acetaminophen  MG per tablet  Commonly known as: NORCO   1 tablet, Oral, 2 Times Daily PRN      insulin NPH-insulin regular (70-30) 100 UNIT/ML injection  Commonly known as: humuLIN 70/30,novoLIN 70/30   50 Units, Subcutaneous, Daily      isosorbide mononitrate 60 MG 24 hr tablet  Commonly known as: IMDUR   60 mg, Oral, 2 Times Daily      metFORMIN 500 MG tablet  Commonly known as: GLUCOPHAGE   500 mg, Oral, 2 Times Daily With Meals      tiotropium 18  MCG per inhalation capsule  Commonly known as: Spiriva HandiHaler   1 capsule, Inhalation, Daily - RT         Stop These Medications    gabapentin 600 MG tablet  Commonly known as: NEURONTIN            Allergies   Allergen Reactions   • Codeine Itching       Discharge Disposition:  Rehab Facility or Unit (DC - External)    Diet:  Hospital:  Diet Order   Procedures   • Diet Cardiac, Diabetic/Consistent Carbs; Healthy Heart; Diabetic - Consistent Carb       Discharge Activity:   Activity Instructions     Activity as Tolerated            CODE STATUS:    Code Status and Medical Interventions:   Ordered at: 11/27/20 1217     Code Status:    CPR     Medical Interventions (Level of Support Prior to Arrest):    Full       Follow-up Appointments  Future Appointments   Date Time Provider Department Center   3/15/2021 10:45 AM HERI Chung DPM [facility]       Additional Instructions for the Follow-ups that You Need to Schedule     Call MD With Problems / Concerns   As directed      Instructions: Call [phone] or email [email or web address] for problems or concerns.    Order Comments: Instructions: Call [phone] or email [email or web address] for problems or concerns.          Discharge Follow-up with PCP   As directed       Currently Documented PCP:    Samantha Zabala APRN    PCP Phone Number:    [phone]     Follow Up Details: 1 week         Discharge Follow-up with Specialty: Neurologist of patient or PCP choice; 1 Month   As directed      Specialty: Neurologist of patient or PCP choice    Follow Up: 1 Month         Discharge Follow-up with Specified Provider: Cardiology - Dr. Ring; 3 Weeks   As directed      To: Cardiology - Dr. Ring    Follow Up: 3 Weeks    Follow Up Details: to start anticoagulation with warfarin or eliquis per Cardiologist         Discharge Follow-up with Specified Provider: nirali ring; 2 Weeks   As directed      To: nirali ring    Follow Up: 2 Weeks                Condition on Discharge:      Stable      This patient has been examined wearing appropriate Personal Protective Equipment. 12/01/20      Electronically signed by Mari Bernal MD, 12/01/20, 5:22 PM EST.      Time: I spent  45  minutes on this discharge activity which included face-to-face encounter with the patient/reviewing the data in the system/coordination of the care with the nursing staff as well as consultants/documentation/entering orders.

## 2020-12-02 NOTE — PROGRESS NOTES
Case Management Discharge Note      Final Note: [facility]         Selected Continued Care - Discharged on 12/1/2020 Admission date: 11/27/2020 - Discharge disposition: Rehab Facility or Unit (DC - External)                 Final Discharge Disposition Code: 03 - skilled nursing facility (SNF)

## 2020-12-23 ENCOUNTER — LAB REQUISITION (OUTPATIENT)
Dept: LAB | Facility: HOSPITAL | Age: 70
End: 2020-12-23

## 2020-12-23 DIAGNOSIS — Z00.00 ROUTINE GENERAL MEDICAL EXAMINATION AT A HEALTH CARE FACILITY: ICD-10-CM

## 2020-12-23 LAB
ANION GAP SERPL CALCULATED.3IONS-SCNC: 16.5 MMOL/L (ref 5–15)
BASOPHILS # BLD AUTO: 0.1 10*3/MM3 (ref 0–0.2)
BASOPHILS NFR BLD AUTO: 1 % (ref 0–1.5)
BUN SERPL-MCNC: 72 MG/DL (ref 8–23)
BUN/CREAT SERPL: 22.5 (ref 7–25)
CALCIUM SPEC-SCNC: 8.6 MG/DL (ref 8.6–10.5)
CHLORIDE SERPL-SCNC: 99 MMOL/L (ref 98–107)
CO2 SERPL-SCNC: 24.5 MMOL/L (ref 22–29)
CREAT SERPL-MCNC: 3.2 MG/DL (ref 0.76–1.27)
DEPRECATED RDW RBC AUTO: 39.6 FL (ref 37–54)
EOSINOPHIL # BLD AUTO: 0.44 10*3/MM3 (ref 0–0.4)
EOSINOPHIL NFR BLD AUTO: 4.5 % (ref 0.3–6.2)
ERYTHROCYTE [DISTWIDTH] IN BLOOD BY AUTOMATED COUNT: 12.5 % (ref 12.3–15.4)
GFR SERPL CREATININE-BSD FRML MDRD: 19 ML/MIN/1.73
GLUCOSE SERPL-MCNC: 142 MG/DL (ref 65–99)
HCT VFR BLD AUTO: 39.1 % (ref 37.5–51)
HGB BLD-MCNC: 13.1 G/DL (ref 13–17.7)
IMM GRANULOCYTES # BLD AUTO: 0.05 10*3/MM3 (ref 0–0.05)
IMM GRANULOCYTES NFR BLD AUTO: 0.5 % (ref 0–0.5)
LYMPHOCYTES # BLD AUTO: 2.46 10*3/MM3 (ref 0.7–3.1)
LYMPHOCYTES NFR BLD AUTO: 25 % (ref 19.6–45.3)
MCH RBC QN AUTO: 29.4 PG (ref 26.6–33)
MCHC RBC AUTO-ENTMCNC: 33.5 G/DL (ref 31.5–35.7)
MCV RBC AUTO: 87.9 FL (ref 79–97)
MONOCYTES # BLD AUTO: 0.95 10*3/MM3 (ref 0.1–0.9)
MONOCYTES NFR BLD AUTO: 9.7 % (ref 5–12)
NEUTROPHILS NFR BLD AUTO: 5.83 10*3/MM3 (ref 1.7–7)
NEUTROPHILS NFR BLD AUTO: 59.3 % (ref 42.7–76)
NRBC BLD AUTO-RTO: 0 /100 WBC (ref 0–0.2)
PLATELET # BLD AUTO: 291 10*3/MM3 (ref 140–450)
PMV BLD AUTO: 11 FL (ref 6–12)
POTASSIUM SERPL-SCNC: 4.2 MMOL/L (ref 3.5–5.2)
RBC # BLD AUTO: 4.45 10*6/MM3 (ref 4.14–5.8)
SODIUM SERPL-SCNC: 140 MMOL/L (ref 136–145)
WBC # BLD AUTO: 9.83 10*3/MM3 (ref 3.4–10.8)

## 2020-12-23 PROCEDURE — 80048 BASIC METABOLIC PNL TOTAL CA: CPT

## 2020-12-23 PROCEDURE — 85025 COMPLETE CBC W/AUTO DIFF WBC: CPT

## 2021-01-01 ENCOUNTER — OFFICE VISIT (OUTPATIENT)
Dept: CARDIOLOGY | Facility: CLINIC | Age: 71
End: 2021-01-01

## 2021-01-01 ENCOUNTER — HOSPITAL ENCOUNTER (OUTPATIENT)
Dept: CARDIOLOGY | Facility: HOSPITAL | Age: 71
Discharge: HOME | End: 2021-12-28

## 2021-01-01 ENCOUNTER — TRANSCRIBE ORDERS (OUTPATIENT)
Dept: ADMINISTRATIVE | Facility: HOSPITAL | Age: 71
End: 2021-01-01

## 2021-01-01 ENCOUNTER — APPOINTMENT (OUTPATIENT)
Dept: VASCULAR SURGERY | Facility: HOSPITAL | Age: 71
End: 2021-01-01

## 2021-01-01 ENCOUNTER — APPOINTMENT (OUTPATIENT)
Dept: CARDIOLOGY | Facility: HOSPITAL | Age: 71
End: 2021-01-01

## 2021-01-01 VITALS
DIASTOLIC BLOOD PRESSURE: 78 MMHG | WEIGHT: 234 LBS | BODY MASS INDEX: 33.5 KG/M2 | HEART RATE: 99 BPM | OXYGEN SATURATION: 89 % | SYSTOLIC BLOOD PRESSURE: 128 MMHG | HEIGHT: 70 IN

## 2021-01-01 DIAGNOSIS — E08.9 DIABETES MELLITUS DUE TO UNDERLYING CONDITION WITHOUT COMPLICATION, WITHOUT LONG-TERM CURRENT USE OF INSULIN (HCC): ICD-10-CM

## 2021-01-01 DIAGNOSIS — R00.0 TACHYCARDIA: ICD-10-CM

## 2021-01-01 DIAGNOSIS — M79.604 RIGHT LEG PAIN: Primary | ICD-10-CM

## 2021-01-01 DIAGNOSIS — N18.6 END STAGE RENAL DISEASE (HCC): Primary | ICD-10-CM

## 2021-01-01 DIAGNOSIS — N18.6 END STAGE RENAL DISEASE (HCC): ICD-10-CM

## 2021-01-01 DIAGNOSIS — E78.5 DYSLIPIDEMIA: ICD-10-CM

## 2021-01-01 DIAGNOSIS — I10 ESSENTIAL HYPERTENSION: ICD-10-CM

## 2021-01-01 DIAGNOSIS — Z95.1 HX OF CABG: Primary | ICD-10-CM

## 2021-01-01 LAB
BH CV VAS MEAS BASILIC ANTECUBITAL FOSSA LEFT: 0.2 CM
BH CV VAS MEAS BASILIC ANTECUBITAL FOSSA RIGHT: 0.35 CM
BH CV VAS MEAS BASILIC FOREARM LEFT - DIST: 0.15 CM
BH CV VAS MEAS BASILIC FOREARM LEFT - MID: 0.14 CM
BH CV VAS MEAS BASILIC FOREARM LEFT - PROX: 0.14 CM
BH CV VAS MEAS BASILIC FOREARM RIGHT - DIST: 0.19 CM
BH CV VAS MEAS BASILIC FOREARM RIGHT - MID: 0.13 CM
BH CV VAS MEAS BASILIC FOREARM RIGHT - PROX: 0.34 CM
BH CV VAS MEAS BASILIC UPPER ARM LEFT - DIST: 0.44 CM
BH CV VAS MEAS BASILIC UPPER ARM LEFT - MID: 0.45 CM
BH CV VAS MEAS BASILIC UPPER ARM RIGHT - DIST: 0.52 CM
BH CV VAS MEAS BASILIC UPPER ARM RIGHT - MID: 0.94 CM
BH CV VAS MEAS CEPHALIC ANTECUBITAL FOSSA LEFT: 0.47 CM
BH CV VAS MEAS CEPHALIC ANTECUBITAL FOSSA RIGHT: 0.37 CM
BH CV VAS MEAS CEPHALIC FOREARM LEFT - DIST: 0.18 CM
BH CV VAS MEAS CEPHALIC FOREARM LEFT - MID: 0.29 CM
BH CV VAS MEAS CEPHALIC FOREARM LEFT - PROX: 0.35 CM
BH CV VAS MEAS CEPHALIC FOREARM RIGHT - DIST: 0.25 CM
BH CV VAS MEAS CEPHALIC FOREARM RIGHT - MID: 0.32 CM
BH CV VAS MEAS CEPHALIC FOREARM RIGHT - PROX: 0.34 CM
BH CV VAS MEAS CEPHALIC UPPER ARM LEFT - DIST: 0.3 CM
BH CV VAS MEAS CEPHALIC UPPER ARM LEFT - MID: 0.33 CM
BH CV VAS MEAS CEPHALIC UPPER ARM LEFT - PROX: 0.28 CM
BH CV VAS MEAS CEPHALIC UPPER ARM RIGHT - DIST: 0.36 CM
BH CV VAS MEAS CEPHALIC UPPER ARM RIGHT - MID: 0.27 CM
BH CV VAS MEAS CEPHALIC UPPER ARM RIGHT - PROX: 0.36 CM
BH CV VAS MEAS RADIAL UPPER ARM LEFT - DIST: 0.32 CM
BH CV VAS MEAS RADIAL UPPER ARM LEFT - MID: 0.35 CM
BH CV VAS MEAS RADIAL UPPER ARM LEFT - PROX: 0.36 CM
BH CV VAS MEAS RADIAL UPPER ARM RIGHT - DIST: 0.32 CM
BH CV VAS MEAS RADIAL UPPER ARM RIGHT - MID: 0.37 CM
BH CV VAS MEAS RADIAL UPPER ARM RIGHT - PROX: 0.39 CM
UPPER ARTERIAL LEFT ARM BRACHIAL LENGTH: 0.54 CM
UPPER ARTERIAL RIGHT ARM BRACHIAL LENGTH: 0.6 CM

## 2021-01-01 PROCEDURE — G0463 HOSPITAL OUTPT CLINIC VISIT: HCPCS

## 2021-01-01 PROCEDURE — 99214 OFFICE O/P EST MOD 30 MIN: CPT | Performed by: INTERNAL MEDICINE

## 2021-01-01 PROCEDURE — 93000 ELECTROCARDIOGRAM COMPLETE: CPT | Performed by: INTERNAL MEDICINE

## 2021-01-01 PROCEDURE — 93986 DUP-SCAN HEMO COMPL UNI STD: CPT

## 2021-01-01 RX ORDER — GABAPENTIN 300 MG/1
100 CAPSULE ORAL 2 TIMES DAILY
Status: ON HOLD | COMMUNITY
End: 2022-01-01 | Stop reason: DRUGHIGH

## 2021-01-01 RX ORDER — HYDROCODONE BITARTRATE AND ACETAMINOPHEN 5; 325 MG/1; MG/1
1 TABLET ORAL EVERY 6 HOURS PRN
COMMUNITY
End: 2022-01-01 | Stop reason: HOSPADM

## 2021-01-01 RX ORDER — DILTIAZEM HYDROCHLORIDE 240 MG/1
240 CAPSULE, COATED, EXTENDED RELEASE ORAL DAILY
Status: ON HOLD | COMMUNITY
End: 2022-01-01

## 2021-03-16 ENCOUNTER — OFFICE VISIT (OUTPATIENT)
Dept: PODIATRY | Facility: CLINIC | Age: 71
End: 2021-03-16

## 2021-03-16 VITALS
BODY MASS INDEX: 41.83 KG/M2 | HEIGHT: 68 IN | DIASTOLIC BLOOD PRESSURE: 71 MMHG | SYSTOLIC BLOOD PRESSURE: 139 MMHG | WEIGHT: 276 LBS | HEART RATE: 64 BPM

## 2021-03-16 DIAGNOSIS — B35.1 ONYCHOMYCOSIS: ICD-10-CM

## 2021-03-16 DIAGNOSIS — I87.303 CHRONIC VENOUS HYPERTENSION WITHOUT COMPLICATIONS, BILATERAL: ICD-10-CM

## 2021-03-16 DIAGNOSIS — E11.42 DM TYPE 2 WITH DIABETIC PERIPHERAL NEUROPATHY (HCC): Primary | ICD-10-CM

## 2021-03-16 PROCEDURE — 11721 DEBRIDE NAIL 6 OR MORE: CPT | Performed by: PODIATRIST

## 2021-03-16 PROCEDURE — 99213 OFFICE O/P EST LOW 20 MIN: CPT | Performed by: PODIATRIST

## 2021-03-16 NOTE — PROGRESS NOTES
03/16/2021  Foot and Ankle Surgery - Established Patient/Follow-up  Provider: Dr. Maximino Chung DPM  Location: [facility]    Subjective:  Benson Mclean is a 70 y.o. male.     Chief Complaint   Patient presents with   • Annual Exam     DM check       HPI: Patient presents with his wife for routine diabetic foot check.  He states that he has had substantial medical issues since last exam.  He did suffered 2 strokes since that time.  He is currently at a rehab facility and continues to have difficulty walking.  He has improved and is able to stand.  He has not had any progressive issues involving his feet and has lost a substantial amount of weight.  He states that he continues to recover.    Allergies   Allergen Reactions   • Codeine Itching       Current Outpatient Medications on File Prior to Visit   Medication Sig Dispense Refill   • aspirin 325 MG tablet Take 325 mg by mouth Daily.     • atenolol (TENORMIN) 100 MG tablet Take 100 mg by mouth Daily.     • atorvastatin (LIPITOR) 40 MG tablet Take 40 mg by mouth Daily.     • bumetanide (BUMEX) 2 MG tablet Take 2 mg by mouth 2 (Two) Times a Day.     • butalbital-acetaminophen-caffeine (FIORICET, ESGIC) -40 MG per tablet Take 1 tablet by mouth Every 4 (Four) Hours As Needed for Headache. 10 tablet 0   • clopidogrel (PLAVIX) 75 MG tablet Take 75 mg by mouth Daily.     • digoxin (LANOXIN) 125 MCG tablet Take 125 mcg by mouth Daily.     • DULoxetine (CYMBALTA) 60 MG capsule Take 60 mg by mouth Daily.     • enalapril (VASOTEC) 5 MG tablet Take 5 mg by mouth Daily.     • HYDROcodone-acetaminophen (NORCO)  MG per tablet Take 1 tablet by mouth 2 (Two) Times a Day As Needed for Moderate Pain . 10 tablet 0   • insulin NPH-insulin regular (humuLIN 70/30,novoLIN 70/30) (70-30) 100 UNIT/ML injection Inject 50 Units under the skin into the appropriate area as directed Daily.     • isosorbide mononitrate (IMDUR) 60 MG 24 hr tablet Take 60 mg by mouth 2  "(Two) Times a Day.     • metFORMIN (GLUCOPHAGE) 500 MG tablet Take 500 mg by mouth 2 (Two) Times a Day With Meals.     • Omega-3 Fatty Acids (fish oil) 1000 MG capsule capsule Take 1,000 mg by mouth Daily.     • tiotropium (Spiriva HandiHaler) 18 MCG per inhalation capsule Place 1 capsule into inhaler and inhale Daily. 30 capsule 1     No current facility-administered medications on file prior to visit.       Objective   /71   Pulse 64   Ht 172.7 cm (68\")   Wt 125 kg (276 lb)   BMI 41.97 kg/m²         General:   Appearance: appears stated age and healthy and obesity    Orientation: AAOx3    Affect: appropriate    Assistance: cane       VASCULAR       Right Foot Vascularity   Dorsalis pedis:  2+  Posterior tibial:  2+  Skin Temperature: warm    Edema Grading:  Pitting and 3+  CFT:  < 3 seconds  Pedal Hair Growth:  Absent      Left Foot Vascularity   Dorsalis pedis:  2+  Posterior tibial:  2+  Skin Temperature: warm    Edema Grading:  3+ and pitting  CFT:  < 3 seconds  Pedal Hair Growth:  Absent      NEUROLOGIC      Right Foot Neurologic   Light touch sensation:  Diminished  Hot/Cold sensation: diminished    Protective Sensation using Redgranite-Avtar Monofilament:  Diminished  Achilles reflex:  1+      Left Foot Neurologic   Light touch sensation:  Diminished  Hot/cold sensation: diminished    Protective Sensation using Redgranite-Avtar Monofilament:  Diminished  Achilles reflex:  1+      MUSCULOSKELETAL       Right Foot Musculoskeletal    Amputation   Right toes amputated: Yes    Toes amputated: fourth toe and fifth toe  Ecchymosis:  None  Tenderness: none    Hammertoe:  Third toe and second toe      Left Foot Musculoskeletal    Amputation   Left toes amputated: Yes    Toes amputated: fifth toe  Ecchymosis:  None  Tenderness: none    Hammertoe:  Second toe, third toe and fourth toe      MUSCLE STRENGTH      Right Foot Muscle Strength   Normal strength    Foot dorsiflexion:  5  Foot plantar flexion:  " 5  Foot inversion:  5  Foot eversion:  5      Left Foot Muscle Strength   Normal strength    Foot dorsiflexion:  5  Foot plantar flexion:  5  Foot inversion:  5  Foot eversion:  5      DERMATOLOGIC      Right Foot Dermatologic   Skin: skin intact    Nails: onychomycosis, abnormally thick and dystrophic nails        Left Foot Dermatologic   Skin: skin intact    Nails: onychomycosis, abnormally thick and dystrophic nails        Right Foot Additional Comments Stable amputations to both feet.  No progressive deformity or instability.  No pain with palpation    Assessment/Plan   Diagnoses and all orders for this visit:    1. DM type 2 with diabetic peripheral neuropathy (CMS/HCC) (Primary)    2. Chronic venous hypertension without complications, bilateral    3. Onychomycosis      Patient does not have any overt issues involving his feet but has had substantial medical issues since last exam.  He does appear to be recovering slowly and remains optimistic.  On exam, he has no open wounds or preulcerative regions.  His nails were debrided without issue.  I have recommended that the facility continue to monitor the feet on a daily basis and call with any issues.  I do recommend that he returns to his diabetic shoes and inserts when he returns home.  He may proceed with physical therapy and other treatments as required.  I would like to see him in 6 months for routine foot check.    Nail debridement: Both feet x7    Nails were debrided with a nail nipper without complication.  No anesthesia was required.  Indications for procedure were thickened, dystrophic, and fungal appearing nails which are difficult to trim.  Proper self-care and technique was discussed with patient.  Patient was stable after procedure.      No orders of the defined types were placed in this encounter.         Note is dictated utilizing voice recognition software. Unfortunately this leads to occasional typographical errors. I apologize in advance if the  situation occurs. If questions occur please do not hesitate to call our office.

## 2021-03-24 ENCOUNTER — TELEPHONE (OUTPATIENT)
Dept: PODIATRY | Facility: CLINIC | Age: 71
End: 2021-03-24

## 2021-03-24 NOTE — TELEPHONE ENCOUNTER
Caller: [facility]     Relationship: [facility]    Best call back number: [phone]     What form or medical record are you requesting: OFFICE NOTE FORM 02/16/21 VISIT     Who is requesting this form or medical record from you: [facility]    Additional notes: NURSE AT [facility] IS REQUESTING OFFICE NOTE FROM LAST VISIT.   PLEASE FAX -[phone]- ATTNClaire GARCIA

## 2021-05-06 ENCOUNTER — HOSPITAL ENCOUNTER (INPATIENT)
Facility: HOSPITAL | Age: 71
LOS: 2 days | Discharge: HOME HEALTH | End: 2021-05-08
Attending: EMERGENCY MEDICINE | Admitting: INTERNAL MEDICINE

## 2021-05-06 ENCOUNTER — TELEPHONE (OUTPATIENT)
Dept: CARDIOLOGY | Facility: CLINIC | Age: 71
End: 2021-05-06

## 2021-05-06 ENCOUNTER — APPOINTMENT (OUTPATIENT)
Dept: GENERAL RADIOLOGY | Facility: HOSPITAL | Age: 71
End: 2021-05-06

## 2021-05-06 DIAGNOSIS — J44.9 CHRONIC OBSTRUCTIVE PULMONARY DISEASE, UNSPECIFIED COPD TYPE (HCC): Primary | ICD-10-CM

## 2021-05-06 DIAGNOSIS — I48.91 ATRIAL FIBRILLATION WITH RAPID VENTRICULAR RESPONSE (HCC): ICD-10-CM

## 2021-05-06 DIAGNOSIS — D64.9 ANEMIA, UNSPECIFIED TYPE: ICD-10-CM

## 2021-05-06 PROBLEM — Z86.73 HISTORY OF CEREBROVASCULAR ACCIDENT: Status: ACTIVE | Noted: 2020-12-02

## 2021-05-06 PROBLEM — E55.9 VITAMIN D DEFICIENCY: Status: ACTIVE | Noted: 2018-04-23

## 2021-05-06 PROBLEM — G47.33 OSA TREATED WITH BIPAP: Status: ACTIVE | Noted: 2017-06-19

## 2021-05-06 PROBLEM — U07.1 COVID-19: Status: ACTIVE | Noted: 2021-02-19

## 2021-05-06 PROBLEM — F32.A DEPRESSIVE DISORDER: Status: ACTIVE | Noted: 2020-12-02

## 2021-05-06 PROBLEM — Z89.429 HISTORY OF AMPUTATION OF LESSER TOE (HCC): Status: ACTIVE | Noted: 2017-09-27

## 2021-05-06 PROBLEM — E78.5 HYPERLIPIDEMIA: Status: ACTIVE | Noted: 2021-05-06

## 2021-05-06 PROBLEM — M54.50 CHRONIC LEFT-SIDED LOW BACK PAIN WITHOUT SCIATICA: Status: ACTIVE | Noted: 2017-12-27

## 2021-05-06 PROBLEM — F03.91 UNSPECIFIED DEMENTIA WITH BEHAVIORAL DISTURBANCE: Status: ACTIVE | Noted: 2020-12-01

## 2021-05-06 PROBLEM — I48.0 PAROXYSMAL ATRIAL FIBRILLATION WITH RAPID VENTRICULAR RESPONSE (HCC): Status: ACTIVE | Noted: 2020-12-01

## 2021-05-06 PROBLEM — L97.929 CHRONIC VENOUS HYPERTENSION (IDIOPATHIC) WITH ULCER AND INFLAMMATION OF BILATERAL LOWER EXTREMITY (HCC): Status: ACTIVE | Noted: 2019-03-20

## 2021-05-06 PROBLEM — Z72.0 TOBACCO USER: Status: ACTIVE | Noted: 2021-02-18

## 2021-05-06 PROBLEM — F33.0 MAJOR DEPRESSIVE DISORDER, RECURRENT, MILD (HCC): Status: ACTIVE | Noted: 2020-12-01

## 2021-05-06 PROBLEM — G81.91 RIGHT HEMIPLEGIA (HCC): Status: ACTIVE | Noted: 2020-12-02

## 2021-05-06 PROBLEM — L97.509 CHRONIC FOOT ULCER: Status: ACTIVE | Noted: 2018-10-22

## 2021-05-06 PROBLEM — I25.10 CORONARY ARTERY DISEASE: Status: ACTIVE | Noted: 2018-07-03

## 2021-05-06 PROBLEM — N28.9 ACUTE RENAL INSUFFICIENCY: Status: ACTIVE | Noted: 2020-12-24

## 2021-05-06 PROBLEM — L97.919 CHRONIC VENOUS HYPERTENSION (IDIOPATHIC) WITH ULCER AND INFLAMMATION OF BILATERAL LOWER EXTREMITY (HCC): Status: ACTIVE | Noted: 2019-03-20

## 2021-05-06 PROBLEM — G89.29 CHRONIC LEFT-SIDED LOW BACK PAIN WITHOUT SCIATICA: Status: ACTIVE | Noted: 2017-12-27

## 2021-05-06 PROBLEM — E66.9 OBESITY: Status: ACTIVE | Noted: 2021-05-06

## 2021-05-06 PROBLEM — G81.91 HEMIPLEGIA, UNSPECIFIED AFFECTING RIGHT DOMINANT SIDE (HCC): Status: ACTIVE | Noted: 2020-12-01

## 2021-05-06 PROBLEM — M62.3 IMMOBILITY SYNDROME: Status: ACTIVE | Noted: 2020-12-01

## 2021-05-06 PROBLEM — I87.333 CHRONIC VENOUS HYPERTENSION (IDIOPATHIC) WITH ULCER AND INFLAMMATION OF BILATERAL LOWER EXTREMITY (HCC): Status: ACTIVE | Noted: 2019-03-20

## 2021-05-06 PROBLEM — I50.9 CHF (CONGESTIVE HEART FAILURE) (HCC): Status: ACTIVE | Noted: 2017-06-19

## 2021-05-06 PROBLEM — K59.00 CONSTIPATION: Status: ACTIVE | Noted: 2021-03-17

## 2021-05-06 PROBLEM — R59.0 LYMPHADENOPATHY, MEDIASTINAL: Status: ACTIVE | Noted: 2019-03-14

## 2021-05-06 PROBLEM — R41.841 COGNITIVE COMMUNICATION DEFICIT: Status: ACTIVE | Noted: 2020-12-01

## 2021-05-06 LAB
ALBUMIN SERPL-MCNC: 3.5 G/DL (ref 3.5–5.2)
ALBUMIN/GLOB SERPL: 1.1 G/DL
ALP SERPL-CCNC: 48 U/L (ref 39–117)
ALT SERPL W P-5'-P-CCNC: 12 U/L (ref 1–41)
ANION GAP SERPL CALCULATED.3IONS-SCNC: 16 MMOL/L (ref 5–15)
APTT PPP: 23.9 SECONDS (ref 24–31)
AST SERPL-CCNC: 12 U/L (ref 1–40)
BASOPHILS # BLD AUTO: 0.1 10*3/MM3 (ref 0–0.2)
BASOPHILS NFR BLD AUTO: 1.5 % (ref 0–1.5)
BILIRUB SERPL-MCNC: 0.6 MG/DL (ref 0–1.2)
BUN SERPL-MCNC: 45 MG/DL (ref 8–23)
BUN/CREAT SERPL: 25.1 (ref 7–25)
CALCIUM SPEC-SCNC: 9.1 MG/DL (ref 8.6–10.5)
CHLORIDE SERPL-SCNC: 102 MMOL/L (ref 98–107)
CO2 SERPL-SCNC: 24 MMOL/L (ref 22–29)
CREAT SERPL-MCNC: 1.79 MG/DL (ref 0.76–1.27)
DEPRECATED RDW RBC AUTO: 46.4 FL (ref 37–54)
DIGOXIN SERPL-MCNC: <0.3 NG/ML (ref 0.6–1.2)
EOSINOPHIL # BLD AUTO: 0.6 10*3/MM3 (ref 0–0.4)
EOSINOPHIL NFR BLD AUTO: 7.4 % (ref 0.3–6.2)
ERYTHROCYTE [DISTWIDTH] IN BLOOD BY AUTOMATED COUNT: 14.3 % (ref 12.3–15.4)
GFR SERPL CREATININE-BSD FRML MDRD: 38 ML/MIN/1.73
GLOBULIN UR ELPH-MCNC: 3.1 GM/DL
GLUCOSE SERPL-MCNC: 178 MG/DL (ref 65–99)
HCT VFR BLD AUTO: 25.8 % (ref 37.5–51)
HGB BLD-MCNC: 8.5 G/DL (ref 13–17.7)
INR PPP: 0.99 (ref 0.93–1.1)
LYMPHOCYTES # BLD AUTO: 1.9 10*3/MM3 (ref 0.7–3.1)
LYMPHOCYTES NFR BLD AUTO: 22.7 % (ref 19.6–45.3)
MAGNESIUM SERPL-MCNC: 1.8 MG/DL (ref 1.6–2.4)
MCH RBC QN AUTO: 30.7 PG (ref 26.6–33)
MCHC RBC AUTO-ENTMCNC: 32.9 G/DL (ref 31.5–35.7)
MCV RBC AUTO: 93.3 FL (ref 79–97)
MONOCYTES # BLD AUTO: 0.9 10*3/MM3 (ref 0.1–0.9)
MONOCYTES NFR BLD AUTO: 10.5 % (ref 5–12)
NEUTROPHILS NFR BLD AUTO: 4.8 10*3/MM3 (ref 1.7–7)
NEUTROPHILS NFR BLD AUTO: 57.9 % (ref 42.7–76)
NRBC BLD AUTO-RTO: 0.1 /100 WBC (ref 0–0.2)
NT-PROBNP SERPL-MCNC: 5625 PG/ML (ref 0–900)
PLATELET # BLD AUTO: 286 10*3/MM3 (ref 140–450)
PMV BLD AUTO: 7.9 FL (ref 6–12)
POTASSIUM SERPL-SCNC: 4.4 MMOL/L (ref 3.5–5.2)
PROT SERPL-MCNC: 6.6 G/DL (ref 6–8.5)
PROTHROMBIN TIME: 10.9 SECONDS (ref 9.6–11.7)
RBC # BLD AUTO: 2.76 10*6/MM3 (ref 4.14–5.8)
SODIUM SERPL-SCNC: 142 MMOL/L (ref 136–145)
TROPONIN T SERPL-MCNC: 0.04 NG/ML (ref 0–0.03)
TSH SERPL DL<=0.05 MIU/L-ACNC: 3.19 UIU/ML (ref 0.27–4.2)
WBC # BLD AUTO: 8.3 10*3/MM3 (ref 3.4–10.8)

## 2021-05-06 PROCEDURE — 83036 HEMOGLOBIN GLYCOSYLATED A1C: CPT | Performed by: NURSE PRACTITIONER

## 2021-05-06 PROCEDURE — 93005 ELECTROCARDIOGRAM TRACING: CPT | Performed by: EMERGENCY MEDICINE

## 2021-05-06 PROCEDURE — 63710000001 INSULIN ISOPHANE & REGULAR PER 5 UNITS: Performed by: NURSE PRACTITIONER

## 2021-05-06 PROCEDURE — 84443 ASSAY THYROID STIM HORMONE: CPT | Performed by: EMERGENCY MEDICINE

## 2021-05-06 PROCEDURE — 82746 ASSAY OF FOLIC ACID SERUM: CPT | Performed by: INTERNAL MEDICINE

## 2021-05-06 PROCEDURE — 83880 ASSAY OF NATRIURETIC PEPTIDE: CPT | Performed by: EMERGENCY MEDICINE

## 2021-05-06 PROCEDURE — 83735 ASSAY OF MAGNESIUM: CPT | Performed by: EMERGENCY MEDICINE

## 2021-05-06 PROCEDURE — 99285 EMERGENCY DEPT VISIT HI MDM: CPT

## 2021-05-06 PROCEDURE — 85025 COMPLETE CBC W/AUTO DIFF WBC: CPT | Performed by: EMERGENCY MEDICINE

## 2021-05-06 PROCEDURE — 85730 THROMBOPLASTIN TIME PARTIAL: CPT | Performed by: EMERGENCY MEDICINE

## 2021-05-06 PROCEDURE — 84484 ASSAY OF TROPONIN QUANT: CPT | Performed by: EMERGENCY MEDICINE

## 2021-05-06 PROCEDURE — 82607 VITAMIN B-12: CPT | Performed by: INTERNAL MEDICINE

## 2021-05-06 PROCEDURE — 99222 1ST HOSP IP/OBS MODERATE 55: CPT | Performed by: NURSE PRACTITIONER

## 2021-05-06 PROCEDURE — 93005 ELECTROCARDIOGRAM TRACING: CPT | Performed by: NURSE PRACTITIONER

## 2021-05-06 PROCEDURE — 25010000002 ENOXAPARIN PER 10 MG: Performed by: NURSE PRACTITIONER

## 2021-05-06 PROCEDURE — 25010000002 DIGOXIN PER 500 MCG: Performed by: EMERGENCY MEDICINE

## 2021-05-06 PROCEDURE — 80162 ASSAY OF DIGOXIN TOTAL: CPT | Performed by: EMERGENCY MEDICINE

## 2021-05-06 PROCEDURE — 99284 EMERGENCY DEPT VISIT MOD MDM: CPT

## 2021-05-06 PROCEDURE — 71045 X-RAY EXAM CHEST 1 VIEW: CPT

## 2021-05-06 PROCEDURE — 85610 PROTHROMBIN TIME: CPT | Performed by: EMERGENCY MEDICINE

## 2021-05-06 PROCEDURE — 80053 COMPREHEN METABOLIC PANEL: CPT | Performed by: EMERGENCY MEDICINE

## 2021-05-06 RX ORDER — LANOLIN ALCOHOL/MO/W.PET/CERES
1000 CREAM (GRAM) TOPICAL DAILY
Status: DISCONTINUED | OUTPATIENT
Start: 2021-05-07 | End: 2021-05-08 | Stop reason: HOSPADM

## 2021-05-06 RX ORDER — SODIUM CHLORIDE 0.9 % (FLUSH) 0.9 %
10 SYRINGE (ML) INJECTION EVERY 12 HOURS SCHEDULED
Status: DISCONTINUED | OUTPATIENT
Start: 2021-05-06 | End: 2021-05-08 | Stop reason: HOSPADM

## 2021-05-06 RX ORDER — ASPIRIN 81 MG/1
81 TABLET, CHEWABLE ORAL DAILY
Status: DISCONTINUED | OUTPATIENT
Start: 2021-05-07 | End: 2021-05-08 | Stop reason: HOSPADM

## 2021-05-06 RX ORDER — ACETAMINOPHEN 325 MG/1
650 TABLET ORAL EVERY 4 HOURS PRN
Status: DISCONTINUED | OUTPATIENT
Start: 2021-05-06 | End: 2021-05-08 | Stop reason: HOSPADM

## 2021-05-06 RX ORDER — WATER / MINERAL OIL / WHITE PETROLATUM 16 OZ
1 CREAM TOPICAL 2 TIMES DAILY PRN
COMMUNITY
End: 2021-06-03 | Stop reason: HOSPADM

## 2021-05-06 RX ORDER — ACETAMINOPHEN 160 MG/5ML
650 SOLUTION ORAL EVERY 4 HOURS PRN
Status: DISCONTINUED | OUTPATIENT
Start: 2021-05-06 | End: 2021-05-08 | Stop reason: HOSPADM

## 2021-05-06 RX ORDER — POTASSIUM CHLORIDE 20 MEQ/1
40 TABLET, EXTENDED RELEASE ORAL AS NEEDED
Status: DISCONTINUED | OUTPATIENT
Start: 2021-05-06 | End: 2021-05-08 | Stop reason: HOSPADM

## 2021-05-06 RX ORDER — SODIUM CHLORIDE 0.9 % (FLUSH) 0.9 %
10 SYRINGE (ML) INJECTION AS NEEDED
Status: DISCONTINUED | OUTPATIENT
Start: 2021-05-06 | End: 2021-05-06

## 2021-05-06 RX ORDER — ISOSORBIDE MONONITRATE 60 MG/1
60 TABLET, EXTENDED RELEASE ORAL 2 TIMES DAILY
Status: DISCONTINUED | OUTPATIENT
Start: 2021-05-06 | End: 2021-05-08 | Stop reason: HOSPADM

## 2021-05-06 RX ORDER — DIGOXIN 125 MCG
125 TABLET ORAL
Status: DISCONTINUED | OUTPATIENT
Start: 2021-05-07 | End: 2021-05-08 | Stop reason: HOSPADM

## 2021-05-06 RX ORDER — SODIUM CHLORIDE 0.9 % (FLUSH) 0.9 %
10 SYRINGE (ML) INJECTION AS NEEDED
Status: DISCONTINUED | OUTPATIENT
Start: 2021-05-06 | End: 2021-05-08 | Stop reason: HOSPADM

## 2021-05-06 RX ORDER — DIGOXIN 0.25 MG/ML
250 INJECTION INTRAMUSCULAR; INTRAVENOUS ONCE
Status: COMPLETED | OUTPATIENT
Start: 2021-05-06 | End: 2021-05-06

## 2021-05-06 RX ORDER — DONEPEZIL HYDROCHLORIDE 5 MG/1
10 TABLET, FILM COATED ORAL NIGHTLY
Status: DISCONTINUED | OUTPATIENT
Start: 2021-05-06 | End: 2021-05-08 | Stop reason: HOSPADM

## 2021-05-06 RX ORDER — INSULIN LISPRO 100 [IU]/ML
0-9 INJECTION, SOLUTION INTRAVENOUS; SUBCUTANEOUS AS NEEDED
Status: DISCONTINUED | OUTPATIENT
Start: 2021-05-06 | End: 2021-05-08 | Stop reason: HOSPADM

## 2021-05-06 RX ORDER — METFORMIN HYDROCHLORIDE 500 MG/1
500 TABLET, EXTENDED RELEASE ORAL 2 TIMES DAILY
COMMUNITY
End: 2021-07-02 | Stop reason: HOSPADM

## 2021-05-06 RX ORDER — NITROGLYCERIN 0.4 MG/1
0.4 TABLET SUBLINGUAL
Status: DISCONTINUED | OUTPATIENT
Start: 2021-05-06 | End: 2021-05-08 | Stop reason: HOSPADM

## 2021-05-06 RX ORDER — GABAPENTIN 100 MG/1
200 CAPSULE ORAL 2 TIMES DAILY
Status: ON HOLD | COMMUNITY
End: 2021-07-02 | Stop reason: SDUPTHER

## 2021-05-06 RX ORDER — HYDRALAZINE HYDROCHLORIDE 50 MG/1
50 TABLET, FILM COATED ORAL 2 TIMES DAILY
COMMUNITY
End: 2021-08-25 | Stop reason: HOSPADM

## 2021-05-06 RX ORDER — IBUPROFEN 600 MG/1
1 TABLET ORAL ONCE AS NEEDED
COMMUNITY
End: 2021-08-02 | Stop reason: HOSPADM

## 2021-05-06 RX ORDER — ACETAMINOPHEN 650 MG/1
650 SUPPOSITORY RECTAL EVERY 4 HOURS PRN
Status: DISCONTINUED | OUTPATIENT
Start: 2021-05-06 | End: 2021-05-08 | Stop reason: HOSPADM

## 2021-05-06 RX ORDER — ONDANSETRON 4 MG/1
4 TABLET, FILM COATED ORAL EVERY 6 HOURS PRN
Status: DISCONTINUED | OUTPATIENT
Start: 2021-05-06 | End: 2021-05-08 | Stop reason: HOSPADM

## 2021-05-06 RX ORDER — HYDROCODONE BITARTRATE AND ACETAMINOPHEN 7.5; 325 MG/1; MG/1
1 TABLET ORAL EVERY 12 HOURS PRN
Status: ON HOLD | COMMUNITY
End: 2021-05-24 | Stop reason: SDUPTHER

## 2021-05-06 RX ORDER — NICOTINE POLACRILEX 4 MG
15 LOZENGE BUCCAL
Status: DISCONTINUED | OUTPATIENT
Start: 2021-05-06 | End: 2021-05-08 | Stop reason: HOSPADM

## 2021-05-06 RX ORDER — DONEPEZIL HYDROCHLORIDE 10 MG/1
10 TABLET, FILM COATED ORAL NIGHTLY
COMMUNITY

## 2021-05-06 RX ORDER — HYDRALAZINE HYDROCHLORIDE 25 MG/1
50 TABLET, FILM COATED ORAL EVERY 12 HOURS SCHEDULED
Status: DISCONTINUED | OUTPATIENT
Start: 2021-05-06 | End: 2021-05-07

## 2021-05-06 RX ORDER — DULOXETIN HYDROCHLORIDE 30 MG/1
60 CAPSULE, DELAYED RELEASE ORAL DAILY
Status: DISCONTINUED | OUTPATIENT
Start: 2021-05-07 | End: 2021-05-08 | Stop reason: HOSPADM

## 2021-05-06 RX ORDER — HYDROCODONE BITARTRATE AND ACETAMINOPHEN 7.5; 325 MG/1; MG/1
1 TABLET ORAL EVERY 12 HOURS PRN
Status: DISCONTINUED | OUTPATIENT
Start: 2021-05-06 | End: 2021-05-08 | Stop reason: HOSPADM

## 2021-05-06 RX ORDER — MAGNESIUM SULFATE HEPTAHYDRATE 40 MG/ML
2 INJECTION, SOLUTION INTRAVENOUS AS NEEDED
Status: DISCONTINUED | OUTPATIENT
Start: 2021-05-06 | End: 2021-05-08 | Stop reason: HOSPADM

## 2021-05-06 RX ORDER — ATORVASTATIN CALCIUM 40 MG/1
40 TABLET, FILM COATED ORAL NIGHTLY
Status: DISCONTINUED | OUTPATIENT
Start: 2021-05-06 | End: 2021-05-08 | Stop reason: HOSPADM

## 2021-05-06 RX ORDER — MAGNESIUM SULFATE 1 G/100ML
1 INJECTION INTRAVENOUS AS NEEDED
Status: DISCONTINUED | OUTPATIENT
Start: 2021-05-06 | End: 2021-05-08 | Stop reason: HOSPADM

## 2021-05-06 RX ORDER — INSULIN LISPRO 100 [IU]/ML
0-9 INJECTION, SOLUTION INTRAVENOUS; SUBCUTANEOUS
Status: DISCONTINUED | OUTPATIENT
Start: 2021-05-07 | End: 2021-05-08 | Stop reason: HOSPADM

## 2021-05-06 RX ORDER — CHOLECALCIFEROL (VITAMIN D3) 125 MCG
5 CAPSULE ORAL NIGHTLY PRN
Status: DISCONTINUED | OUTPATIENT
Start: 2021-05-06 | End: 2021-05-08 | Stop reason: HOSPADM

## 2021-05-06 RX ORDER — ONDANSETRON 2 MG/ML
4 INJECTION INTRAMUSCULAR; INTRAVENOUS EVERY 6 HOURS PRN
Status: DISCONTINUED | OUTPATIENT
Start: 2021-05-06 | End: 2021-05-08 | Stop reason: HOSPADM

## 2021-05-06 RX ORDER — ATENOLOL 50 MG/1
100 TABLET ORAL DAILY
Status: DISCONTINUED | OUTPATIENT
Start: 2021-05-07 | End: 2021-05-08 | Stop reason: HOSPADM

## 2021-05-06 RX ORDER — DEXTROSE MONOHYDRATE 25 G/50ML
25 INJECTION, SOLUTION INTRAVENOUS
Status: DISCONTINUED | OUTPATIENT
Start: 2021-05-06 | End: 2021-05-08 | Stop reason: HOSPADM

## 2021-05-06 RX ORDER — ALUMINA, MAGNESIA, AND SIMETHICONE 2400; 2400; 240 MG/30ML; MG/30ML; MG/30ML
15 SUSPENSION ORAL EVERY 6 HOURS PRN
Status: DISCONTINUED | OUTPATIENT
Start: 2021-05-06 | End: 2021-05-08 | Stop reason: HOSPADM

## 2021-05-06 RX ORDER — LANOLIN ALCOHOL/MO/W.PET/CERES
1000 CREAM (GRAM) TOPICAL DAILY
COMMUNITY

## 2021-05-06 RX ADMIN — INSULIN HUMAN 30 UNITS: 100 INJECTION, SUSPENSION SUBCUTANEOUS at 21:34

## 2021-05-06 RX ADMIN — SODIUM CHLORIDE 15 MG/HR: 900 INJECTION, SOLUTION INTRAVENOUS at 23:22

## 2021-05-06 RX ADMIN — TICAGRELOR 90 MG: 90 TABLET ORAL at 21:35

## 2021-05-06 RX ADMIN — ISOSORBIDE MONONITRATE 60 MG: 60 TABLET, EXTENDED RELEASE ORAL at 21:41

## 2021-05-06 RX ADMIN — Medication 10 ML: at 23:22

## 2021-05-06 RX ADMIN — HYDRALAZINE HYDROCHLORIDE 50 MG: 25 TABLET, FILM COATED ORAL at 23:22

## 2021-05-06 RX ADMIN — DIGOXIN 250 MCG: 250 INJECTION, SOLUTION INTRAMUSCULAR; INTRAVENOUS; PARENTERAL at 20:39

## 2021-05-06 RX ADMIN — SODIUM CHLORIDE 5 MG/HR: 900 INJECTION, SOLUTION INTRAVENOUS at 18:15

## 2021-05-06 RX ADMIN — DONEPEZIL HYDROCHLORIDE 10 MG: 5 TABLET, FILM COATED ORAL at 21:35

## 2021-05-06 RX ADMIN — ATORVASTATIN CALCIUM 40 MG: 40 TABLET, FILM COATED ORAL at 21:35

## 2021-05-06 RX ADMIN — ENOXAPARIN SODIUM 120 MG: 120 INJECTION SUBCUTANEOUS at 21:34

## 2021-05-06 RX ADMIN — ISOSORBIDE MONONITRATE 60 MG: 60 TABLET, EXTENDED RELEASE ORAL at 21:35

## 2021-05-06 NOTE — TELEPHONE ENCOUNTER
Patient's wife called, patient at PCP office and has elevated HR at 120, PCP wants patient seen today. Advised we have no apointments today and he needs to go to ER for evaluation.

## 2021-05-07 ENCOUNTER — APPOINTMENT (OUTPATIENT)
Dept: CARDIOLOGY | Facility: HOSPITAL | Age: 71
End: 2021-05-07

## 2021-05-07 LAB
ANION GAP SERPL CALCULATED.3IONS-SCNC: 14 MMOL/L (ref 5–15)
BASOPHILS # BLD AUTO: 0.1 10*3/MM3 (ref 0–0.2)
BASOPHILS NFR BLD AUTO: 1.2 % (ref 0–1.5)
BH CV ECHO MEAS - ACS: 1.9 CM
BH CV ECHO MEAS - AO MAX PG (FULL): -2.1 MMHG
BH CV ECHO MEAS - AO MAX PG: 3.2 MMHG
BH CV ECHO MEAS - AO MEAN PG (FULL): -0.67 MMHG
BH CV ECHO MEAS - AO MEAN PG: 1.5 MMHG
BH CV ECHO MEAS - AO ROOT AREA (BSA CORRECTED): 1.5
BH CV ECHO MEAS - AO ROOT AREA: 10.3 CM^2
BH CV ECHO MEAS - AO ROOT DIAM: 3.6 CM
BH CV ECHO MEAS - AO V2 MAX: 88.9 CM/SEC
BH CV ECHO MEAS - AO V2 MEAN: 57.4 CM/SEC
BH CV ECHO MEAS - AO V2 VTI: 16.8 CM
BH CV ECHO MEAS - AORTIC HR: 66.2 BPM
BH CV ECHO MEAS - AORTIC R-R: 0.91 SEC
BH CV ECHO MEAS - ASC AORTA: 3.4 CM
BH CV ECHO MEAS - AVA(I,A): 3.4 CM^2
BH CV ECHO MEAS - AVA(I,D): 3.4 CM^2
BH CV ECHO MEAS - AVA(V,A): 3.6 CM^2
BH CV ECHO MEAS - AVA(V,D): 3.6 CM^2
BH CV ECHO MEAS - BSA(HAYCOCK): 2.5 M^2
BH CV ECHO MEAS - BSA: 2.3 M^2
BH CV ECHO MEAS - BZI_BMI: 37.4 KILOGRAMS/M^2
BH CV ECHO MEAS - BZI_METRIC_HEIGHT: 177.8 CM
BH CV ECHO MEAS - BZI_METRIC_WEIGHT: 118.4 KG
BH CV ECHO MEAS - CI(AO): 4.9 L/MIN/M^2
BH CV ECHO MEAS - CI(LVOT): 1.6 L/MIN/M^2
BH CV ECHO MEAS - CO(AO): 11.4 L/MIN
BH CV ECHO MEAS - CO(LVOT): 3.8 L/MIN
BH CV ECHO MEAS - CONTRAST EF 4CH: 44 CM2
BH CV ECHO MEAS - EDV(CUBED): 224.9 ML
BH CV ECHO MEAS - EDV(MOD-SP4): 128.4 ML
BH CV ECHO MEAS - EDV(TEICH): 185.6 ML
BH CV ECHO MEAS - EF(CUBED): 27.3 %
BH CV ECHO MEAS - EF(MOD-SP4): 43.8 %
BH CV ECHO MEAS - EF(TEICH): 21.6 %
BH CV ECHO MEAS - ESV(CUBED): 163.5 ML
BH CV ECHO MEAS - ESV(MOD-SP4): 72.2 ML
BH CV ECHO MEAS - ESV(TEICH): 145.4 ML
BH CV ECHO MEAS - FS: 10.1 %
BH CV ECHO MEAS - IVS/LVPW: 1.2
BH CV ECHO MEAS - IVSD: 1.3 CM
BH CV ECHO MEAS - LA DIMENSION(2D): 4.1 CM
BH CV ECHO MEAS - LV DIASTOLIC VOL/BSA (35-75): 54.9 ML/M^2
BH CV ECHO MEAS - LV MASS(C)D: 307 GRAMS
BH CV ECHO MEAS - LV MASS(C)DI: 131.3 GRAMS/M^2
BH CV ECHO MEAS - LV MAX PG: 5.2 MMHG
BH CV ECHO MEAS - LV MEAN PG: 2.2 MMHG
BH CV ECHO MEAS - LV SYSTOLIC VOL/BSA (12-30): 30.9 ML/M^2
BH CV ECHO MEAS - LV V1 MAX: 114.4 CM/SEC
BH CV ECHO MEAS - LV V1 MEAN: 63.5 CM/SEC
BH CV ECHO MEAS - LV V1 VTI: 20.3 CM
BH CV ECHO MEAS - LVIDD: 6.1 CM
BH CV ECHO MEAS - LVIDS: 5.5 CM
BH CV ECHO MEAS - LVOT AREA: 2.8 CM^2
BH CV ECHO MEAS - LVOT DIAM: 1.9 CM
BH CV ECHO MEAS - LVPWD: 1.1 CM
BH CV ECHO MEAS - MV MAX PG: 9 MMHG
BH CV ECHO MEAS - MV MEAN PG: 3.1 MMHG
BH CV ECHO MEAS - MV V2 MAX: 150.2 CM/SEC
BH CV ECHO MEAS - MV V2 MEAN: 79.6 CM/SEC
BH CV ECHO MEAS - MV V2 VTI: 33.6 CM
BH CV ECHO MEAS - MVA(VTI): 1.7 CM^2
BH CV ECHO MEAS - PA ACC TIME: 0.09 SEC
BH CV ECHO MEAS - PA MAX PG (FULL): 0.83 MMHG
BH CV ECHO MEAS - PA MAX PG: 5.5 MMHG
BH CV ECHO MEAS - PA MEAN PG (FULL): 0.48 MMHG
BH CV ECHO MEAS - PA MEAN PG: 2.8 MMHG
BH CV ECHO MEAS - PA PR(ACCEL): 39.6 MMHG
BH CV ECHO MEAS - PA V2 MAX: 117 CM/SEC
BH CV ECHO MEAS - PA V2 MEAN: 77 CM/SEC
BH CV ECHO MEAS - PA V2 VTI: 24.9 CM
BH CV ECHO MEAS - PULM DIAS VEL: 71.5 CM/SEC
BH CV ECHO MEAS - PULM S/D: 0.31
BH CV ECHO MEAS - PULM SYS VEL: 22.3 CM/SEC
BH CV ECHO MEAS - PVA(I,A): 4.2 CM^2
BH CV ECHO MEAS - PVA(I,D): 4.2 CM^2
BH CV ECHO MEAS - PVA(V,A): 3.8 CM^2
BH CV ECHO MEAS - PVA(V,D): 3.8 CM^2
BH CV ECHO MEAS - QP/QS: 1.8
BH CV ECHO MEAS - RAP SYSTOLE: 3 MMHG
BH CV ECHO MEAS - RV MAX PG: 4.6 MMHG
BH CV ECHO MEAS - RV MEAN PG: 2.3 MMHG
BH CV ECHO MEAS - RV V1 MAX: 107.8 CM/SEC
BH CV ECHO MEAS - RV V1 MEAN: 71.2 CM/SEC
BH CV ECHO MEAS - RV V1 VTI: 25.5 CM
BH CV ECHO MEAS - RVDD: 2.4 CM
BH CV ECHO MEAS - RVOT AREA: 4.1 CM^2
BH CV ECHO MEAS - RVOT DIAM: 2.3 CM
BH CV ECHO MEAS - RVSP: 13.5 MMHG
BH CV ECHO MEAS - SI(AO): 73.9 ML/M^2
BH CV ECHO MEAS - SI(CUBED): 26.3 ML/M^2
BH CV ECHO MEAS - SI(LVOT): 24.4 ML/M^2
BH CV ECHO MEAS - SI(MOD-SP4): 24 ML/M^2
BH CV ECHO MEAS - SI(TEICH): 17.2 ML/M^2
BH CV ECHO MEAS - SV(AO): 172.7 ML
BH CV ECHO MEAS - SV(CUBED): 61.4 ML
BH CV ECHO MEAS - SV(LVOT): 57 ML
BH CV ECHO MEAS - SV(MOD-SP4): 56.2 ML
BH CV ECHO MEAS - SV(RVOT): 104.4 ML
BH CV ECHO MEAS - SV(TEICH): 40.2 ML
BH CV ECHO MEAS - TR MAX VEL: 162.2 CM/SEC
BUN SERPL-MCNC: 46 MG/DL (ref 8–23)
BUN/CREAT SERPL: 26.7 (ref 7–25)
CALCIUM SPEC-SCNC: 8.7 MG/DL (ref 8.6–10.5)
CHLORIDE SERPL-SCNC: 105 MMOL/L (ref 98–107)
CO2 SERPL-SCNC: 22 MMOL/L (ref 22–29)
CREAT SERPL-MCNC: 1.72 MG/DL (ref 0.76–1.27)
DEPRECATED RDW RBC AUTO: 46.8 FL (ref 37–54)
EOSINOPHIL # BLD AUTO: 0.5 10*3/MM3 (ref 0–0.4)
EOSINOPHIL NFR BLD AUTO: 7.2 % (ref 0.3–6.2)
ERYTHROCYTE [DISTWIDTH] IN BLOOD BY AUTOMATED COUNT: 14.4 % (ref 12.3–15.4)
FERRITIN SERPL-MCNC: 162.4 NG/ML (ref 30–400)
FOLATE SERPL-MCNC: 8.16 NG/ML (ref 4.78–24.2)
GFR SERPL CREATININE-BSD FRML MDRD: 40 ML/MIN/1.73
GLUCOSE BLDC GLUCOMTR-MCNC: 112 MG/DL (ref 70–105)
GLUCOSE BLDC GLUCOMTR-MCNC: 130 MG/DL (ref 70–105)
GLUCOSE BLDC GLUCOMTR-MCNC: 190 MG/DL (ref 70–105)
GLUCOSE SERPL-MCNC: 104 MG/DL (ref 65–99)
HAPTOGLOB SERPL-MCNC: 316 MG/DL (ref 30–200)
HBA1C MFR BLD: 7.5 % (ref 3.5–5.6)
HCT VFR BLD AUTO: 23.2 % (ref 37.5–51)
HGB BLD-MCNC: 7.7 G/DL (ref 13–17.7)
IRON 24H UR-MRATE: 30 MCG/DL (ref 59–158)
IRON 24H UR-MRATE: 30 MCG/DL (ref 59–158)
IRON SATN MFR SERPL: 13 % (ref 20–50)
LDH SERPL-CCNC: 169 U/L (ref 135–225)
LYMPHOCYTES # BLD AUTO: 1.6 10*3/MM3 (ref 0.7–3.1)
LYMPHOCYTES NFR BLD AUTO: 23.9 % (ref 19.6–45.3)
MAGNESIUM SERPL-MCNC: 1.8 MG/DL (ref 1.6–2.4)
MCH RBC QN AUTO: 30.4 PG (ref 26.6–33)
MCHC RBC AUTO-ENTMCNC: 33.3 G/DL (ref 31.5–35.7)
MCV RBC AUTO: 91.5 FL (ref 79–97)
MONOCYTES # BLD AUTO: 0.9 10*3/MM3 (ref 0.1–0.9)
MONOCYTES NFR BLD AUTO: 13.1 % (ref 5–12)
NEUTROPHILS NFR BLD AUTO: 3.7 10*3/MM3 (ref 1.7–7)
NEUTROPHILS NFR BLD AUTO: 54.6 % (ref 42.7–76)
NRBC BLD AUTO-RTO: 0 /100 WBC (ref 0–0.2)
NT-PROBNP SERPL-MCNC: 6011 PG/ML (ref 0–900)
PLATELET # BLD AUTO: 270 10*3/MM3 (ref 140–450)
PMV BLD AUTO: 7.9 FL (ref 6–12)
POTASSIUM SERPL-SCNC: 3.9 MMOL/L (ref 3.5–5.2)
QT INTERVAL: 390 MS
RBC # BLD AUTO: 2.53 10*6/MM3 (ref 4.14–5.8)
SODIUM SERPL-SCNC: 141 MMOL/L (ref 136–145)
TIBC SERPL-MCNC: 235 MCG/DL (ref 298–536)
TRANSFERRIN SERPL-MCNC: 158 MG/DL (ref 200–360)
TROPONIN T SERPL-MCNC: 0.03 NG/ML (ref 0–0.03)
TROPONIN T SERPL-MCNC: 0.04 NG/ML (ref 0–0.03)
VIT B12 BLD-MCNC: 1016 PG/ML (ref 211–946)
WBC # BLD AUTO: 6.8 10*3/MM3 (ref 3.4–10.8)

## 2021-05-07 PROCEDURE — 83735 ASSAY OF MAGNESIUM: CPT | Performed by: NURSE PRACTITIONER

## 2021-05-07 PROCEDURE — 97162 PT EVAL MOD COMPLEX 30 MIN: CPT

## 2021-05-07 PROCEDURE — 83880 ASSAY OF NATRIURETIC PEPTIDE: CPT | Performed by: NURSE PRACTITIONER

## 2021-05-07 PROCEDURE — 83540 ASSAY OF IRON: CPT | Performed by: INTERNAL MEDICINE

## 2021-05-07 PROCEDURE — 99222 1ST HOSP IP/OBS MODERATE 55: CPT | Performed by: INTERNAL MEDICINE

## 2021-05-07 PROCEDURE — 80048 BASIC METABOLIC PNL TOTAL CA: CPT | Performed by: NURSE PRACTITIONER

## 2021-05-07 PROCEDURE — 84466 ASSAY OF TRANSFERRIN: CPT | Performed by: INTERNAL MEDICINE

## 2021-05-07 PROCEDURE — 97116 GAIT TRAINING THERAPY: CPT

## 2021-05-07 PROCEDURE — 83615 LACTATE (LD) (LDH) ENZYME: CPT | Performed by: INTERNAL MEDICINE

## 2021-05-07 PROCEDURE — 25010000002 ENOXAPARIN PER 10 MG: Performed by: NURSE PRACTITIONER

## 2021-05-07 PROCEDURE — 25010000002 SULFUR HEXAFLUORIDE MICROSPH 60.7-25 MG RECONSTITUTED SUSPENSION: Performed by: HOSPITALIST

## 2021-05-07 PROCEDURE — 82728 ASSAY OF FERRITIN: CPT | Performed by: INTERNAL MEDICINE

## 2021-05-07 PROCEDURE — 84484 ASSAY OF TROPONIN QUANT: CPT | Performed by: NURSE PRACTITIONER

## 2021-05-07 PROCEDURE — 83010 ASSAY OF HAPTOGLOBIN QUANT: CPT | Performed by: INTERNAL MEDICINE

## 2021-05-07 PROCEDURE — 85025 COMPLETE CBC W/AUTO DIFF WBC: CPT | Performed by: NURSE PRACTITIONER

## 2021-05-07 PROCEDURE — 63710000001 INSULIN ISOPHANE & REGULAR PER 5 UNITS: Performed by: NURSE PRACTITIONER

## 2021-05-07 PROCEDURE — 63710000001 INSULIN LISPRO (HUMAN) PER 5 UNITS: Performed by: NURSE PRACTITIONER

## 2021-05-07 PROCEDURE — 97166 OT EVAL MOD COMPLEX 45 MIN: CPT

## 2021-05-07 PROCEDURE — 99232 SBSQ HOSP IP/OBS MODERATE 35: CPT | Performed by: INTERNAL MEDICINE

## 2021-05-07 PROCEDURE — 25010000002 FUROSEMIDE PER 20 MG: Performed by: NURSE PRACTITIONER

## 2021-05-07 PROCEDURE — 82962 GLUCOSE BLOOD TEST: CPT

## 2021-05-07 PROCEDURE — 93010 ELECTROCARDIOGRAM REPORT: CPT | Performed by: INTERNAL MEDICINE

## 2021-05-07 PROCEDURE — 93306 TTE W/DOPPLER COMPLETE: CPT

## 2021-05-07 PROCEDURE — 93306 TTE W/DOPPLER COMPLETE: CPT | Performed by: INTERNAL MEDICINE

## 2021-05-07 RX ORDER — BUMETANIDE 0.25 MG/ML
1 INJECTION INTRAMUSCULAR; INTRAVENOUS EVERY 12 HOURS
Status: DISCONTINUED | OUTPATIENT
Start: 2021-05-07 | End: 2021-05-07

## 2021-05-07 RX ORDER — HYDRALAZINE HYDROCHLORIDE 10 MG/1
10 TABLET, FILM COATED ORAL EVERY 12 HOURS SCHEDULED
Status: DISCONTINUED | OUTPATIENT
Start: 2021-05-07 | End: 2021-05-08 | Stop reason: HOSPADM

## 2021-05-07 RX ORDER — BUMETANIDE 1 MG/1
1 TABLET ORAL 2 TIMES DAILY
Status: DISCONTINUED | OUTPATIENT
Start: 2021-05-07 | End: 2021-05-07

## 2021-05-07 RX ORDER — BUMETANIDE 1 MG/1
1 TABLET ORAL 2 TIMES DAILY
Status: DISCONTINUED | OUTPATIENT
Start: 2021-05-07 | End: 2021-05-08

## 2021-05-07 RX ORDER — ALBUTEROL SULFATE 2.5 MG/3ML
2.5 SOLUTION RESPIRATORY (INHALATION) EVERY 6 HOURS PRN
Status: DISCONTINUED | OUTPATIENT
Start: 2021-05-07 | End: 2021-05-08 | Stop reason: HOSPADM

## 2021-05-07 RX ORDER — FUROSEMIDE 10 MG/ML
80 INJECTION INTRAMUSCULAR; INTRAVENOUS EVERY 8 HOURS
Status: DISCONTINUED | OUTPATIENT
Start: 2021-05-07 | End: 2021-05-07

## 2021-05-07 RX ORDER — FUROSEMIDE 10 MG/ML
40 INJECTION INTRAMUSCULAR; INTRAVENOUS EVERY 8 HOURS
Status: DISCONTINUED | OUTPATIENT
Start: 2021-05-07 | End: 2021-05-07

## 2021-05-07 RX ADMIN — Medication 10 ML: at 20:50

## 2021-05-07 RX ADMIN — ISOSORBIDE MONONITRATE 60 MG: 60 TABLET, EXTENDED RELEASE ORAL at 20:51

## 2021-05-07 RX ADMIN — ENOXAPARIN SODIUM 120 MG: 120 INJECTION SUBCUTANEOUS at 09:18

## 2021-05-07 RX ADMIN — DULOXETINE HYDROCHLORIDE 60 MG: 30 CAPSULE, DELAYED RELEASE ORAL at 09:18

## 2021-05-07 RX ADMIN — FUROSEMIDE 80 MG: 10 INJECTION, SOLUTION INTRAMUSCULAR; INTRAVENOUS at 05:25

## 2021-05-07 RX ADMIN — BUMETANIDE 1 MG: 0.25 INJECTION INTRAMUSCULAR; INTRAVENOUS at 12:58

## 2021-05-07 RX ADMIN — ATENOLOL 100 MG: 50 TABLET ORAL at 09:18

## 2021-05-07 RX ADMIN — CYANOCOBALAMIN TAB 1000 MCG 1000 MCG: 1000 TAB at 09:18

## 2021-05-07 RX ADMIN — ASPIRIN 81 MG: 81 TABLET, CHEWABLE ORAL at 09:19

## 2021-05-07 RX ADMIN — DONEPEZIL HYDROCHLORIDE 10 MG: 5 TABLET, FILM COATED ORAL at 20:51

## 2021-05-07 RX ADMIN — HYDROCODONE BITARTRATE AND ACETAMINOPHEN 1 TABLET: 7.5; 325 TABLET ORAL at 21:16

## 2021-05-07 RX ADMIN — INSULIN HUMAN 30 UNITS: 100 INJECTION, SUSPENSION SUBCUTANEOUS at 18:27

## 2021-05-07 RX ADMIN — TICAGRELOR 90 MG: 90 TABLET ORAL at 09:18

## 2021-05-07 RX ADMIN — BUMETANIDE 1 MG: 1 TABLET ORAL at 20:50

## 2021-05-07 RX ADMIN — ENOXAPARIN SODIUM 120 MG: 120 INJECTION SUBCUTANEOUS at 20:51

## 2021-05-07 RX ADMIN — INSULIN HUMAN 30 UNITS: 100 INJECTION, SUSPENSION SUBCUTANEOUS at 09:19

## 2021-05-07 RX ADMIN — Medication 10 ML: at 09:18

## 2021-05-07 RX ADMIN — ATORVASTATIN CALCIUM 40 MG: 40 TABLET, FILM COATED ORAL at 20:50

## 2021-05-07 RX ADMIN — ISOSORBIDE MONONITRATE 60 MG: 60 TABLET, EXTENDED RELEASE ORAL at 09:18

## 2021-05-07 RX ADMIN — DIGOXIN 125 MCG: 125 TABLET ORAL at 12:58

## 2021-05-07 RX ADMIN — SODIUM CHLORIDE 5 MG/HR: 900 INJECTION, SOLUTION INTRAVENOUS at 05:25

## 2021-05-07 RX ADMIN — SULFUR HEXAFLUORIDE 3 ML: KIT at 06:34

## 2021-05-07 RX ADMIN — INSULIN LISPRO 2 UNITS: 100 INJECTION, SOLUTION INTRAVENOUS; SUBCUTANEOUS at 12:59

## 2021-05-08 ENCOUNTER — APPOINTMENT (OUTPATIENT)
Dept: CT IMAGING | Facility: HOSPITAL | Age: 71
End: 2021-05-08

## 2021-05-08 VITALS
OXYGEN SATURATION: 98 % | WEIGHT: 256.84 LBS | TEMPERATURE: 97.9 F | RESPIRATION RATE: 16 BRPM | BODY MASS INDEX: 36.77 KG/M2 | HEIGHT: 70 IN | SYSTOLIC BLOOD PRESSURE: 141 MMHG | DIASTOLIC BLOOD PRESSURE: 63 MMHG | HEART RATE: 64 BPM

## 2021-05-08 LAB
ANION GAP SERPL CALCULATED.3IONS-SCNC: 13 MMOL/L (ref 5–15)
BUN SERPL-MCNC: 48 MG/DL (ref 8–23)
BUN/CREAT SERPL: 23.8 (ref 7–25)
CALCIUM SPEC-SCNC: 8.6 MG/DL (ref 8.6–10.5)
CHLORIDE SERPL-SCNC: 105 MMOL/L (ref 98–107)
CO2 SERPL-SCNC: 23 MMOL/L (ref 22–29)
CREAT SERPL-MCNC: 2.02 MG/DL (ref 0.76–1.27)
GFR SERPL CREATININE-BSD FRML MDRD: 33 ML/MIN/1.73
GLUCOSE BLDC GLUCOMTR-MCNC: 111 MG/DL (ref 70–105)
GLUCOSE BLDC GLUCOMTR-MCNC: 120 MG/DL (ref 70–105)
GLUCOSE BLDC GLUCOMTR-MCNC: 148 MG/DL (ref 70–105)
GLUCOSE BLDC GLUCOMTR-MCNC: 34 MG/DL (ref 70–105)
GLUCOSE BLDC GLUCOMTR-MCNC: 80 MG/DL (ref 70–105)
GLUCOSE SERPL-MCNC: 98 MG/DL (ref 65–99)
HCT VFR BLD AUTO: 27.9 % (ref 37.5–51)
HGB BLD-MCNC: 9.3 G/DL (ref 13–17.7)
MAGNESIUM SERPL-MCNC: 1.9 MG/DL (ref 1.6–2.4)
POTASSIUM SERPL-SCNC: 4.4 MMOL/L (ref 3.5–5.2)
SODIUM SERPL-SCNC: 141 MMOL/L (ref 136–145)

## 2021-05-08 PROCEDURE — 85018 HEMOGLOBIN: CPT | Performed by: INTERNAL MEDICINE

## 2021-05-08 PROCEDURE — 25010000002 ENOXAPARIN PER 10 MG: Performed by: NURSE PRACTITIONER

## 2021-05-08 PROCEDURE — 85014 HEMATOCRIT: CPT | Performed by: INTERNAL MEDICINE

## 2021-05-08 PROCEDURE — 63710000001 INSULIN ISOPHANE & REGULAR PER 5 UNITS: Performed by: NURSE PRACTITIONER

## 2021-05-08 PROCEDURE — 99222 1ST HOSP IP/OBS MODERATE 55: CPT | Performed by: PSYCHIATRY & NEUROLOGY

## 2021-05-08 PROCEDURE — 70450 CT HEAD/BRAIN W/O DYE: CPT

## 2021-05-08 PROCEDURE — 80048 BASIC METABOLIC PNL TOTAL CA: CPT | Performed by: INTERNAL MEDICINE

## 2021-05-08 PROCEDURE — 83735 ASSAY OF MAGNESIUM: CPT | Performed by: NURSE PRACTITIONER

## 2021-05-08 PROCEDURE — 99221 1ST HOSP IP/OBS SF/LOW 40: CPT | Performed by: INTERNAL MEDICINE

## 2021-05-08 PROCEDURE — 99239 HOSP IP/OBS DSCHRG MGMT >30: CPT | Performed by: INTERNAL MEDICINE

## 2021-05-08 PROCEDURE — 82962 GLUCOSE BLOOD TEST: CPT

## 2021-05-08 RX ORDER — BUMETANIDE 1 MG/1
1 TABLET ORAL DAILY
Status: DISCONTINUED | OUTPATIENT
Start: 2021-05-09 | End: 2021-05-08 | Stop reason: HOSPADM

## 2021-05-08 RX ADMIN — ENOXAPARIN SODIUM 120 MG: 120 INJECTION SUBCUTANEOUS at 08:43

## 2021-05-08 RX ADMIN — HYDRALAZINE HYDROCHLORIDE 10 MG: 10 TABLET, FILM COATED ORAL at 08:45

## 2021-05-08 RX ADMIN — CYANOCOBALAMIN TAB 1000 MCG 1000 MCG: 1000 TAB at 08:45

## 2021-05-08 RX ADMIN — DIGOXIN 125 MCG: 125 TABLET ORAL at 13:46

## 2021-05-08 RX ADMIN — BUMETANIDE 1 MG: 1 TABLET ORAL at 08:44

## 2021-05-08 RX ADMIN — TICAGRELOR 90 MG: 90 TABLET ORAL at 08:45

## 2021-05-08 RX ADMIN — Medication 10 ML: at 08:44

## 2021-05-08 RX ADMIN — ASPIRIN 81 MG: 81 TABLET, CHEWABLE ORAL at 08:44

## 2021-05-08 RX ADMIN — ATENOLOL 100 MG: 50 TABLET ORAL at 08:44

## 2021-05-08 RX ADMIN — DEXTROSE MONOHYDRATE 25 G: 25 INJECTION, SOLUTION INTRAVENOUS at 02:15

## 2021-05-08 RX ADMIN — INSULIN HUMAN 30 UNITS: 100 INJECTION, SUSPENSION SUBCUTANEOUS at 08:44

## 2021-05-08 RX ADMIN — DULOXETINE HYDROCHLORIDE 60 MG: 30 CAPSULE, DELAYED RELEASE ORAL at 08:45

## 2021-05-08 RX ADMIN — ISOSORBIDE MONONITRATE 60 MG: 60 TABLET, EXTENDED RELEASE ORAL at 08:45

## 2021-05-08 NOTE — DISCHARGE SUMMARY
Date of Admission: 5/6/2021    Date of Discharge:  5/8/2021    Length of stay:  LOS: 2 days     Presenting Problem:   Atrial fibrillation with rapid ventricular response (CMS/HCC) [I48.91]  Anemia, unspecified type [D64.9]      Active Diagnosis During Hospital Stay/Discharge Diagnoses/Course by Diagnoses:     A. fib RVR, paroxysmal A. fib  -rate controlled   -echo EF 35 % but similar to previous  -Continue digoxin and atenolol home medication  -troponin minimally elevated likely demand ischemia and CKD related no chest pain  -placed patient on Eliquis to prevent further stroke as below based on cardiology recs   -Cardiology followed     Acute on chronic HFrEF due to A. Fib/hypertension  -diuresed  -resume home bumex   -Monitor volume status     CKD stage IIIb  -Cr ranged from 1.7-3.0 the past few months  -Cr 2.0 at d/c     HTN, CAD remote CABG  -Continue aspirin, Imdur  -stent was over 1 yr ago per cardiology thus they d/c the Brilinta     Type II DM/neuropathy  -Home Lantus 30 units, SSI  -Continue Cymbalta  -Monitor and adjust as needed     Chronic stroke w/right hemiaplasia  -On aspirin and changed to Eliquis as above  -CT head 5/8/21: showed no bleeding and no acute process, thus Neurology cleared for AC use      Dementia, depression  -Continue Aricept, Cymbalta     ANNETTE  -can use home unit if able to bring in     Anemia likely AOCD and CKD  -hgb stable at d/c 9.3  -recommend repeat CBC in one week after d/c  -Fe panel showed mild Fe deficency    Active Hospital Problems    Diagnosis  POA   • **Paroxysmal atrial fibrillation with rapid ventricular response (CMS/HCC) [I48.0]  Yes   • Obesity [E66.9]  Yes   • Right hemiplegia (CMS/HCC) [G81.91]  Yes   • History of cerebrovascular accident [Z86.73]  Not Applicable   • Depressive disorder [F32.9]  Yes   • Immobility syndrome [M62.3]  Yes   • Hemiplegia, unspecified affecting right dominant side (CMS/HCC) [G81.91]  Yes   • Unspecified dementia with behavioral  disturbance (CMS/[facility]) [F03.91]  Yes   • Chronic obstructive pulmonary disease, unspecified (CMS/[facility]) [J44.9]  Yes   • Essential hypertension [I10]  Yes   • Dyslipidemia [E78.5]  Yes   • Hx of CABG [Z95.1]  Not Applicable   • Diabetes mellitus due to underlying condition without complication, without long-term current use of insulin (CMS/[facility]) [E08.9]  Yes   • Coronary artery disease [I25.10]  Yes   • ANNETTE treated with BiPAP [G47.33]  Yes   • Acute congestive heart failure (CMS/[facility]) [I50.9]  Yes   • Diabetic neuropathy (CMS/[facility]) [E11.40]  Yes      Resolved Hospital Problems   No resolved problems to display.         Hospital Course  Patient is a 70 y.o. male presented with weighted heart rate had A. fib RVR but was placed on Cardizem Cardizem drip and heart rate was able to be controlled on his home medication regimen after drip was stopped.  He was followed by cardiology and neurology while here. Rate was controlled, he was cleared for AC. Worked with PT. He was back to his baseline and felt stable to d/c with close outpt follow up.       Procedures Performed:none as noted above         Consults:   Consults     Date and Time Order Name Status Description    5/7/2021  7:48 PM Inpatient Neurology Consult Other (see comments) Completed     5/6/2021  9:12 PM Inpatient Cardiology Consult Completed     5/6/2021  8:23 PM Hospitalist (on-call MD unless specified) Completed           Pertinent Test Results:     Results from last 7 days   Lab Units 05/07/21  0558 05/06/21  1811   WBC 10*3/mm3 6.80 8.30   HEMOGLOBIN g/dL 7.7* 8.5*   HEMATOCRIT % 23.2* 25.8*   PLATELETS 10*3/mm3 270 286     Results from last 7 days   Lab Units 05/08/21  0258 05/07/21  0558 05/06/21  1811   SODIUM mmol/L 141 141 142   POTASSIUM mmol/L 4.4 3.9 4.4   CHLORIDE mmol/L 105 105 102   CO2 mmol/L 23.0 22.0 24.0   BUN mg/dL 48* 46* 45*   CREATININE mg/dL 2.02* 1.72* 1.79*   CALCIUM mg/dL 8.6 8.7 9.1   BILIRUBIN mg/dL  --   --  0.6   ALK PHOS U/L  --   --   48   ALT (SGPT) U/L  --   --  12   AST (SGOT) U/L  --   --  12   GLUCOSE mg/dL 98 104* 178*       Microbiology Results (last 10 days)     ** No results found for the last 240 hours. **          Results for orders placed during the hospital encounter of 05/06/21    Adult Transthoracic Echo Complete w/ Color, Spectral and Contrast if necessary per protocol    Interpretation Summary  Technically difficult study due to poor acoustic windows, not all left ventricular walls are well visualized therefore Lumason contrast was given to assess LV function..  LVE with basal inferior wall hypokinesis and septal dyskinesis, estimated LV ejection fraction of 35%  Normal RV size  Biatrial enlargement seen.  Aortic valve, mitral valve, tricuspid valve appears structurally normal, no significant regurgitation seen.  No pericardial effusion seen.  Proximal aorta appears normal in size.      Imaging Results (All)     Procedure Component Value Units Date/Time    CT Head Without Contrast [[id number]] Collected: 05/08/21 1307     Updated: 05/08/21 1316    Narrative:      CT HEAD WO CONTRAST-     Date of Exam: 5/8/2021 12:50 PM     Indication: Evaluate for intracranial hemorrhage prior to starting  anticoagulation, history of stroke; J44.9-Chronic obstructive pulmonary  disease, unspecified; I48.91-Unspecified atrial fibrillation;  D64.9-Anemia, unspecified.       Comparison: CT head 11/29/2020, MRI brain 11/27/2020.     Technique:  Without contrast, contiguous axial CT images of the head  were obtained from skull base to vertex.   Automated exposure control  and iterative reconstruction methods were used.     FINDINGS:  No acute intracranial hemorrhage is identified. There has been expected  evolution from previously seen infarcts in the left occipital lobe and  left thalamus with increased hypodensity. Changes of old infarct in the  left parieto-occipital region and right cerebellar hemisphere appear  stable. No new loss of gray-white  matter differentiation is identified.  There is mild global volume loss with mild probable chronic small vessel  ischemic change. Atelectatic calcifications are seen in vessels the  skull base. No fractures are seen. Included paranasal sinuses and  mastoid air cells are clear.       Impression:      1.No acute intracranial abnormality/hemorrhage.  2.Remote infarcts, as described above.  3.Mild global volume loss and mild probable chronic small vessel  ischemic change.     Electronically Signed By-Samantha Navarro MD On:5/8/2021 1:14 PM  This report was finalized on [id number] by  Samantha Navarro MD.    XR Chest 1 View [[id number]] Collected: 05/06/21 1851     Updated: 05/06/21 1853    Narrative:      Examination: XR CHEST 1 VW-     Date of Exam: 5/6/2021 6:20 PM     Indication: soa.     Comparison: 11/27/2020     Technique: 1 view of the chest      Findings:  The heart is enlarged with changes of midline sternotomy. The pulmonary  vascular markings are within normal limits. The lungs are clear. The  osseous structures are normal for age.       Impression:      No active disease. Stable chest     Electronically Signed By-Davis Dee MD On:5/6/2021 6:51 PM  This report was finalized on [id number] by  Davis Dee MD.            Condition on Discharge:  Chronically ill but stable     Vital Signs  Temp:  [96 °F (35.6 °C)-98.3 °F (36.8 °C)] 97.5 °F (36.4 °C)  Heart Rate:  [61-87] 87  Resp:  [18-20] 18  BP: (102-138)/(41-58) 120/56    Physical Exam:  Physical Exam  Cardiovascular:      Rate and Rhythm: Normal rate.   Pulmonary:      Effort: No respiratory distress.   Abdominal:      General: There is no distension.      Palpations: Abdomen is soft.   Skin:     General: Skin is warm.   Neurological:      Mental Status: He is alert.      Comments: Mild right sided weakness from previous stroke   Psychiatric:         Behavior: Behavior normal.         Discharge Disposition  Home-Health Care c    Discharge  Medications     Discharge Medications      New Medications      Instructions Start Date   apixaban 5 MG tablet tablet  Commonly known as: ELIQUIS   5 mg, Oral, Every 12 Hours Scheduled         Continue These Medications      Instructions Start Date   aspirin 81 MG chewable tablet   81 mg, Oral, Daily      atenolol 100 MG tablet  Commonly known as: TENORMIN   100 mg, Oral, Daily      atorvastatin 40 MG tablet  Commonly known as: LIPITOR   40 mg, Oral, Nightly      bumetanide 1 MG tablet  Commonly known as: BUMEX   1 mg, Oral, Every Morning      digoxin 125 MCG tablet  Commonly known as: LANOXIN   125 mcg, Oral, Daily Digoxin, In the afternoon      donepezil 10 MG tablet  Commonly known as: ARICEPT   10 mg, Oral, Nightly      DULoxetine 60 MG capsule  Commonly known as: CYMBALTA   60 mg, Oral, Daily      eucerin cream   1 application, Topical, 2 Times Daily PRN, To upper & lower extremities       fish oil 1000 MG capsule capsule   1,000 mg, Oral, Daily      gabapentin 100 MG capsule  Commonly known as: NEURONTIN   200 mg, Oral, 2 times daily      Glucagon Emergency 1 MG kit   1 mg, Injection, Once As Needed      hydrALAZINE 50 MG tablet  Commonly known as: APRESOLINE   50 mg, Oral, 2 times daily, Hold for SBP <110       HYDROcodone-acetaminophen 7.5-325 MG per tablet  Commonly known as: NORCO   1 tablet, Oral, Every 12 Hours PRN      insulin NPH-insulin regular (70-30) 100 UNIT/ML injection  Commonly known as: humuLIN 70/30,novoLIN 70/30   30 Units, Subcutaneous, 2 Times Daily With Meals      isosorbide mononitrate 60 MG 24 hr tablet  Commonly known as: IMDUR   60 mg, Oral, 2 Times Daily      metFORMIN  MG 24 hr tablet  Commonly known as: GLUCOPHAGE-XR   500 mg, Oral, 2 times daily      tiotropium 18 MCG per inhalation capsule  Commonly known as: Spiriva HandiHaler   1 capsule, Inhalation, Daily - RT      vitamin B-12 1000 MCG tablet  Commonly known as: CYANOCOBALAMIN   1,000 mcg, Oral, Daily         Stop These  Medications    ticagrelor 90 MG tablet tablet  Commonly known as: BRILINTA            Discharge Diet:   Diet Instructions     Diet: Consistent Carbohydrate, Renal      Discharge Diet:  Consistent Carbohydrate  Renal             Activity at Discharge:   Activity Instructions     Gradually Increase Activity Until at Pre-Hospitalization Level            Follow-up Appointments  Future Appointments   Date Time Provider Department Center   5/27/2021 11:40 AM Jose G Rm MD [facility]   9/16/2021  1:00 PM HERI Chung DPM [facility]     Additional Instructions for the Follow-ups that You Need to Schedule     Ambulatory Referral to Home Health   As directed      [facility] resumption of care    Order Comments: [facility] resumption of care     Face to Face Visit Date: 5/7/2021    Follow-up provider for Plan of Care?: I treated the patient in an acute care facility and will not continue treatment after discharge.    Follow-up provider: KJ MAS [[id number]]    Reason/Clinical Findings: resumption of care    Describe mobility limitations that make leaving home difficult: resumption of care    Nursing/Therapeutic Services Requested: Other    Frequency: 1 Week 1         Discharge Follow-up with PCP   As directed       Currently Documented PCP:    Kj Mas APRN    PCP Phone Number:    [phone]     Follow Up Details: one week         Discharge Follow-up with Specified Provider: cardiology; 2 Weeks   As directed      To: cardiology    Follow Up: 2 Weeks               Test Results Pending at Discharge       Risk for Readmission (LACE) Score: 12 (5/8/2021  6:01 AM)      This patient has been examined wearing appropriate Personal Protective Equipment . 05/08/21      Time: Discharge 40 min with face-to-face history exam, writing of prescriptions, and documenting discharge data including care coordination with the nursing staff.      Electronically signed by Jose Guadalupe Corea DO, 05/08/21, 13:33  EDT.

## 2021-05-08 NOTE — CONSULTS
Primary Care Provider: Samantha Zabala APRN     Consult requested by: Richar Akhtar MD    Reason for Consultation: Neurological evaluation for starting anticoagulation.     Benson Mclean is a 70 y.o. male *    History taken from: patient chart    Chief complaint: Evaluation for start of anticoagulation.         SUBJECTIVE:    History of present illness:   The patient is a 70 year old gentleman with multiple medical problems including CAD, s/p  CABG, MI, stent placement in 2009, 2015, intermittent atrial fibrillation, HTN, HLD, DM ANNETTE who had stroke in late November 2020. It involved the left PCA distribution, Left Thalamus and occipital region.  The patient was not a candidate for tPA.  He had recovered well with some residual right sided hemiparesis.   He is being admitted for atrial fibrillation and shortness of air.  At present he seems to be at his baseline.   He denies double vision, speech and swallowing problems and bowel and bladder problems.     Review of Systems   Constitutional: malaise,  No fever.   HENT: Negative.    Eyes: Negative.    Respiratory: SOA  Cardiovascular: chest pain,  Leg  Swelling,orthpnea.    Gastrointestinal: Negative.    Genitourinary: Negative.    Musculoskeletal: low back pain  Skin: Negative.    Neurological: stroke.    Hematological: Negative.    Psychiatric/Behavioral: memory problems.        PATIENT HISTORY:  Past Medical History:   Diagnosis Date   • Appetite loss    • Brain bleed (CMS/HCC)    • Carpal tunnel syndrome on left     carpal tunnel on left hand   • Diabetic neuropathy (CMS/HCC)    • DM2 (diabetes mellitus, type 2) (CMS/HCC)    • History of angina    • Hyperlipidemia    • Hypogonadism in male    • Obesity    • Sleep apnea    • Stroke (CMS/HCC)    • Unsteady gait    ,   Past Surgical History:   Procedure Laterality Date   • ANGIOPLASTY      X2   • APPENDECTOMY     • CARDIAC CATHETERIZATION  11/13/2015   • CARPAL TUNNEL RELEASE Left 04/29/2018    carpal  tunnel- lt hand// other hand surgeries    • CATARACT EXTRACTION, BILATERAL  2002    Dr. Lux Acosta   • CHOLECYSTECTOMY     • COLON RESECTION  2005   • CORONARY ANGIOPLASTY     • CORONARY ANGIOPLASTY WITH STENT PLACEMENT  11/13/2015    PTCA stent to proximal in stent and mid to distal lad   • CORONARY ANGIOPLASTY WITH STENT PLACEMENT  09/16/2016    PTCA stent to mid lad and stent to vein graft to marginal   • CORONARY ARTERY BYPASS GRAFT  2005    @ [facility]   • CYST REMOVAL      cyst removed from scrotum   • FOOT SURGERY Right 07/17/2018   • FOOT SURGERY Left 02/04/2019   • TOE AMPUTATION Right     right toe removed D/T infected cut that went to the bone   ,   Family History   Problem Relation Age of Onset   • Heart disease Mother    • Heart disease Father    • Diabetes Sister    • Heart disease Sister    • Diabetes Brother    • Mental illness Brother    ,   Social History     Tobacco Use   • Smoking status: Former Smoker     Packs/day: 1.00     Types: Cigarettes   • Smokeless tobacco: Never Used   Vaping Use   • Vaping Use: Never used   Substance Use Topics   • Alcohol use: No   • Drug use: Yes     Frequency: 1.0 times per week     Types: Marijuana     Comment: socially   ,   Medications Prior to Admission   Medication Sig Dispense Refill Last Dose   • aspirin 81 MG chewable tablet Chew 81 mg Daily.   5/6/2021 at Unknown time   • atenolol (TENORMIN) 100 MG tablet Take 100 mg by mouth Daily.      • atorvastatin (LIPITOR) 40 MG tablet Take 40 mg by mouth Every Night.   5/5/2021 at Unknown time   • bumetanide (BUMEX) 1 MG tablet Take 1 mg by mouth Every Morning.   5/6/2021 at Unknown time   • digoxin (LANOXIN) 125 MCG tablet Take 125 mcg by mouth Daily. In the afternoon   5/5/2021 at Unknown time   • donepezil (ARICEPT) 10 MG tablet Take 10 mg by mouth Every Night.   5/5/2021 at Unknown time   • DULoxetine (CYMBALTA) 60 MG capsule Take 60 mg by mouth Daily.   5/6/2021 at Unknown time   • gabapentin (NEURONTIN) 100 MG capsule  Take 200 mg by mouth 2 (two) times a day.   5/6/2021 at Unknown time   • Glucagon, rDNA, (Glucagon Emergency) 1 MG kit Inject 1 mg as directed 1 (One) Time As Needed (hypoglycemia).      • hydrALAZINE (APRESOLINE) 50 MG tablet Take 50 mg by mouth 2 (two) times a day. Hold for SBP <110   5/6/2021 at Unknown time   • HYDROcodone-acetaminophen (NORCO) 7.5-325 MG per tablet Take 1 tablet by mouth Every 12 (Twelve) Hours As Needed for Mild Pain  or Moderate Pain .      • insulin NPH-insulin regular (humuLIN 70/30,novoLIN 70/30) (70-30) 100 UNIT/ML injection Inject 30 Units under the skin into the appropriate area as directed 2 (Two) Times a Day With Meals.   5/6/2021 at Unknown time   • isosorbide mononitrate (IMDUR) 60 MG 24 hr tablet Take 60 mg by mouth 2 (Two) Times a Day.   5/6/2021 at Unknown time   • metFORMIN ER (GLUCOPHAGE-XR) 500 MG 24 hr tablet Take 500 mg by mouth 2 (two) times a day.   5/6/2021 at Unknown time   • Omega-3 Fatty Acids (fish oil) 1000 MG capsule capsule Take 1,000 mg by mouth Daily.   5/6/2021 at Unknown time   • Skin Protectants, Misc. (eucerin) cream Apply 1 application topically to the appropriate area as directed 2 (Two) Times a Day As Needed for Dry Skin. To upper & lower extremities      • ticagrelor (BRILINTA) 90 MG tablet tablet Take 90 mg by mouth 2 (Two) Times a Day.   5/6/2021 at Unknown time   • tiotropium (Spiriva HandiHaler) 18 MCG per inhalation capsule Place 1 capsule into inhaler and inhale Daily. 30 capsule 1 5/6/2021 at Unknown time   • vitamin B-12 (CYANOCOBALAMIN) 1000 MCG tablet Take 1,000 mcg by mouth Daily.   5/6/2021 at Unknown time   , Scheduled Meds:  aspirin, 81 mg, Oral, Daily  atenolol, 100 mg, Oral, Daily  atorvastatin, 40 mg, Oral, Nightly  bumetanide, 1 mg, Oral, BID  digoxin, 125 mcg, Oral, Daily  donepezil, 10 mg, Oral, Nightly  DULoxetine, 60 mg, Oral, Daily  enoxaparin, 1 mg/kg, Subcutaneous, BID  hydrALAZINE, 10 mg, Oral, Q12H  insulin lispro, 0-9 Units,  Subcutaneous, TID AC  insulin NPH-insulin regular, 30 Units, Subcutaneous, BID With Meals  isosorbide mononitrate, 60 mg, Oral, BID  sodium chloride, 10 mL, Intravenous, Q12H  vitamin B-12, 1,000 mcg, Oral, Daily    , Continuous Infusions:   , PRN Meds:  •  acetaminophen **OR** acetaminophen **OR** acetaminophen  •  albuterol  •  aluminum-magnesium hydroxide-simethicone  •  dextrose  •  dextrose  •  glucagon (human recombinant)  •  HYDROcodone-acetaminophen  •  insulin lispro **AND** insulin lispro  •  magnesium sulfate **OR** magnesium sulfate in D5W 1g/100mL (PREMIX)  •  melatonin  •  nitroglycerin  •  ondansetron **OR** ondansetron  •  potassium chloride  •  [COMPLETED] Insert peripheral IV **AND** sodium chloride, Allergies:  Codeine    ________________________________________________________        OBJECTIVE:  Upon today's exam, The patient is obese gentleman lying in bed in no apparent distress.   Head  NC, AT,   Neck  Supple, no adenopathy, Lungs increased efffort,  Decreased air movement.   CV  S1-S2 present no murmur.  Abdomen soft, non tender.  Ext  Edema++, no cyanosis.          Neurologic Exam :    The patient is awake, alert, oriented x 3,  Speech is fluent with good comprehension, follow commands. CN VFFC, EOMI, no facial droop. Tongue midline.  Motor right upper and lower  Extremities 4+/5,  Left upper and lower extremities  5/5. Sensory light touch intact. Reflexes absent, plantar mute, Cerebellum finger to nose intact. Gait is deferred secondary to patient's request.   ________________________________________________________   RESULTS REVIEW:    VITAL SIGNS:   Temp:  [96 °F (35.6 °C)-98.3 °F (36.8 °C)] 97.1 °F (36.2 °C)  Heart Rate:  [] 65  Resp:  [18-20] 18  BP: ()/(40-58) 138/58     LABS:  WBC   Date Value Ref Range Status   05/07/2021 6.80 3.40 - 10.80 10*3/mm3 Final     RBC   Date Value Ref Range Status   05/07/2021 2.53 (L) 4.14 - 5.80 10*6/mm3 Final     Hemoglobin   Date Value Ref  Range Status   05/07/2021 7.7 (L) 13.0 - 17.7 g/dL Final     Hematocrit   Date Value Ref Range Status   05/07/2021 23.2 (L) 37.5 - 51.0 % Final     MCV   Date Value Ref Range Status   05/07/2021 91.5 79.0 - 97.0 fL Final     MCH   Date Value Ref Range Status   05/07/2021 30.4 26.6 - 33.0 pg Final     MCHC   Date Value Ref Range Status   05/07/2021 33.3 31.5 - 35.7 g/dL Final     RDW   Date Value Ref Range Status   05/07/2021 14.4 12.3 - 15.4 % Final     RDW-SD   Date Value Ref Range Status   05/07/2021 46.8 37.0 - 54.0 fl Final     MPV   Date Value Ref Range Status   05/07/2021 7.9 6.0 - 12.0 fL Final     Platelets   Date Value Ref Range Status   05/07/2021 270 140 - 450 10*3/mm3 Final     Neutrophil %   Date Value Ref Range Status   05/07/2021 54.6 42.7 - 76.0 % Final     Lymphocyte %   Date Value Ref Range Status   05/07/2021 23.9 19.6 - 45.3 % Final     Monocyte %   Date Value Ref Range Status   05/07/2021 13.1 (H) 5.0 - 12.0 % Final     Eosinophil %   Date Value Ref Range Status   05/07/2021 7.2 (H) 0.3 - 6.2 % Final     Basophil %   Date Value Ref Range Status   05/07/2021 1.2 0.0 - 1.5 % Final     Neutrophils, Absolute   Date Value Ref Range Status   05/07/2021 3.70 1.70 - 7.00 10*3/mm3 Final     Lymphocytes, Absolute   Date Value Ref Range Status   05/07/2021 1.60 0.70 - 3.10 10*3/mm3 Final     Monocytes, Absolute   Date Value Ref Range Status   05/07/2021 0.90 0.10 - 0.90 10*3/mm3 Final     Eosinophils, Absolute   Date Value Ref Range Status   05/07/2021 0.50 (H) 0.00 - 0.40 10*3/mm3 Final     Basophils, Absolute   Date Value Ref Range Status   05/07/2021 0.10 0.00 - 0.20 10*3/mm3 Final     nRBC   Date Value Ref Range Status   05/07/2021 0.0 0.0 - 0.2 /100 WBC Final     Glucose   Date Value Ref Range Status   05/07/2021 104 (H) 65 - 99 mg/dL Final     BUN   Date Value Ref Range Status   05/07/2021 46 (H) 8 - 23 mg/dL Final     Creatinine   Date Value Ref Range Status   05/07/2021 1.72 (H) 0.76 - 1.27 mg/dL  Final     Sodium   Date Value Ref Range Status   05/07/2021 141 136 - 145 mmol/L Final     Potassium   Date Value Ref Range Status   05/07/2021 3.9 3.5 - 5.2 mmol/L Final     Chloride   Date Value Ref Range Status   05/07/2021 105 98 - 107 mmol/L Final     CO2   Date Value Ref Range Status   05/07/2021 22.0 22.0 - 29.0 mmol/L Final     Calcium   Date Value Ref Range Status   05/07/2021 8.7 8.6 - 10.5 mg/dL Final     Total Protein   Date Value Ref Range Status   05/06/2021 6.6 6.0 - 8.5 g/dL Final     Albumin   Date Value Ref Range Status   05/06/2021 3.50 3.50 - 5.20 g/dL Final     ALT (SGPT)   Date Value Ref Range Status   05/06/2021 12 1 - 41 U/L Final     AST (SGOT)   Date Value Ref Range Status   05/06/2021 12 1 - 40 U/L Final     Alkaline Phosphatase   Date Value Ref Range Status   05/06/2021 48 39 - 117 U/L Final     Total Bilirubin   Date Value Ref Range Status   05/06/2021 0.6 0.0 - 1.2 mg/dL Final     eGFR Non  Amer   Date Value Ref Range Status   05/07/2021 40 (L) >60 mL/min/1.73 Final     BUN/Creatinine Ratio   Date Value Ref Range Status   05/07/2021 26.7 (H) 7.0 - 25.0 Final     Anion Gap   Date Value Ref Range Status   05/07/2021 14.0 5.0 - 15.0 mmol/L Final       Lab Results   Component Value Date    TSH 3.190 05/06/2021    LDL 89 11/27/2020    HGBA1C 7.5 (H) 05/06/2021    [id number] 1,016 (H) 05/06/2021         IMAGING STUDIES:  XR Chest 1 View    Result Date: 5/6/2021  No active disease. Stable chest  Electronically Signed By-Davis Dee MD On:5/6/2021 6:51 PM This report was finalized on [id number] by  Davis Dee MD.      I reviewed the patient's new clinical results.      ________________________________________________________     PROBLEM LIST:    Paroxysmal atrial fibrillation with rapid ventricular response (CMS/HCC)    Diabetes mellitus due to underlying condition without complication, without long-term current use of insulin (CMS/HCC)    Hx of CABG    Essential hypertension     Dyslipidemia    ANNETTE treated with BiPAP    Right hemiplegia (CMS/HCC)    Immobility syndrome    History of cerebrovascular accident    Hemiplegia, unspecified affecting right dominant side (CMS/HCC)    Diabetic neuropathy (CMS/HCC)    Depressive disorder    Unspecified dementia with behavioral disturbance (CMS/HCC)    Coronary artery disease    Obesity    Chronic obstructive pulmonary disease, unspecified (CMS/HCC)    Acute congestive heart failure (CMS/HCC)          Assessment/Plan   ASSESSMENT/PLAN:  The patient is a  70 year old gentleman with multiple medical problems including atrial fibrillation, DM, HLD,  HTN, s/p ischemic stroke in Nov 2020  Involving the left PCA distribution who will benefit from use of oral anti  Coagulant.  Neuro status is stable.      Rec:  Will obtain a  CT Scan of Head without contrast, if it does not  Showed any hemorrhages then anti coagulants can be started.     Afib  Plan as  Per  Cardiology.  HLD on Lipitor,  HTN on anti hypertensive meds, CKD,  DM, depression as per primary care team.  Will follow as  Needed.       Modification of stroke risk factors:   - Blood pressure should be less than 130/80 outpatient, HbA1c less than 6.5, LDL less than 70; b12>500 and smoking cessation if applicable. We would be grateful if the primary team / primary care physician would keep a close watch on the above targets.  - Stroke education  - Follow up with neurologist of choice      I discussed the patient's findings and my recommendations with patient and primary care team    Zayra Moran MD  05/08/21  09:08 EDT

## 2021-05-08 NOTE — CONSULTS
Cardiology Consult Note    Patient Identification:  Name: Benson Mclean  Age: 70 y.o.  Sex: male  :  1950  MRN: [id number]             Requesting Physician :  Hospitalist Dr. Corea    Reason for Consultation / Chief Complaint : patient co-management  ? New onset afib, history of CVA history of CAD/CABG     History of Present Illness:      This is a 70 year old male with past medical history of     - CAD/CABG , MI  and   status post stent to LAD     Status post stent to LAD 2015  Status post stent to mid LAD and SVG to marginal branch 2016.   -history of intermittent atrial fibrillation  - Left MCA territory stroke with persistent right sided weakness and loss of balance, memory issues   - s/p MARSHA that showed biatrial enlargement, smoking in left atrium and left ventricle and LO, LVE with EF 35-40%  - Hypertension, hyperlipidemia   - Diabetes   - ANNETTE  - renal dysfunction previous creatine 3.-- down to 1.7 today   -status post colon surgery (partial colectomy) appendectomy cholecystectomy right 4th toe removal and carpal tunnel surgery  -allergy to codeine  - history of tobacco use       Who presented to the ED after primary care found patient to have irregular heart rate and rhythm; they called to try to get in to see primary cardiologist DR. Rm however since he is on Vacation, advised the ED.  In the ED patient was found to have afib RVR and was started on Cardizem drip and loaded with IV digoxin as well.  Labs in the ED showed troponin 0.035, pro bnp 5625, , BUN 45, cr 1.79 A1C 7.5 hgb 8.5.  Patient reports intermittent chest pain that comes and goes within 20 secs or less.  He does report worsening lower extremity edema, dyspnea on any type of exertion (just getting out of bed), orthopnea.  On examination cardizem drip has been discontinued rate remains 90-100s   Will change to IV bumex- monitor renal function and electrolytes     Chart reviewed:  Cardiac  "catheterization 09/16/2016 revealed  Left ventricular inferior wall hypokinesis with ejection fraction of 40%.  Left main coronary artery is normal  Proximal LAD stent is patent.  95-99% mid LAD InStent restenoses  RCA and circumflex coronary arteries are chronically occluded.  Sherman is not a graft.  Lima is normal  Left subclavian artery is normal  SVG to marginal branch has proximal 90% disease.  It is a jump graft to 1st and 2nd marginal branches.  First marginal branch has been chronically occluded.  SVG to RCA is patent        Transesophageal echocardiogram 11/30/2020.  Biatrial enlargement.  Smoking effect in the left atrium and left ventricle and left atrial appendage.  Mild mitral and aortic regurgitation.  Left ventricle enlargement with diffuse hypocontractility with ejection fraction of 35 to 40%.       Further assessment and plan per Dr. Akhtar   Patient reported \"brain bleed\" but I do not see that documented.  Patient was not started on anticoagulation in Nov after CVA.  Currently on Aspirin and brilinta.           Cardiology attending note :    Patient seen and examined.  Chart reviewed.  Discussed with my nurse practitioner.  Reviewed her documentation.  This is 70-year-old with previous history of CAD, CABG, PCI, recurrent stroke with history of hemorrhagic conversion in the past.  Severe LV dysfunction was in primary care office was found to have irregular heart rhythm therefore sent here.  Patient is in A. fib.      Assessment:      Paroxysmal A. fib with rapid ventricular response  History of recurrent CVA with right hemiplegia, memory issues, balance issue  CAD history of CABG 2005 and PCI to LAD 2009, 2015, NIELS to LAD and SVG to OM 2016  History of MI in 2000 and 2002  Acute on chronic  HFrEF due to ischemic cardiomyopathy LVEF of 35 to 40% by MARSHA 11/30/2020, EF of 40% by echo 11/27/2020  Hypertension,   hyperlipidemia,   diabetes,   ANNETTE, COPD  CKD  S/P partial colectomy, appendectomy, " cholecystectomy, right fourth toe removal, carpal tunnel  Allergies/intolerance to codeine  Former smoker  Family history of heart disease in father mother and sister      Recommendations / Plan:        Counseled on smoking cessation  Telemetry  Rate control patient was given IV digoxin and IV Cardizem drip was started.  We will continue rate control  Troponin was mildly elevated probably due to demand ischemia.  Will monitor trend.  Echo to assess LV function.  Monitor blood pressure and treat accordingly.  Continue statins for dyslipidemia.  Will defer treatment of diabetes COPD to primary team.  Diuresis as tolerated IV Bumex  Monitor renal function and urine output and electrolytes  Patient's last drug-eluting stent was over a year ago will discontinue Brilinta.  Continue baby aspirin and Eliquis as tolerated to prevent thromboembolic from A. fib and recurrent CVA  Patient says he had brain bleed in the past.  Will consult neurology to clear patient for Eliquis.  My review of records MRI from 11/27/2020 revealed  1. Acute left PCA distribution infarct involving the left occipital lobe  and dorsal aspect of the left thalamus.  2. Encephalomalacia involving the left parietal-occipital region related  to remote infarct.  3. Small area of remote lacunar infarction involving the right  cerebellum.  4. Generalized cerebral atrophy.    Patient is scheduled for CT head per neurology recommendation  If there is no intracranial pathology okay to start anticoagulation therapy if cleared by neurology  Risk benefits and alternatives were long-term oral anticoagulation reviewed and discussed with patient  Further recommendations based on patient work-up         Diagnosis Plan   1. Chronic obstructive pulmonary disease, unspecified COPD type (CMS/HCC)  Ambulatory Referral to Home Health   2. Atrial fibrillation with rapid ventricular response (CMS/HCC)     3. Anemia, unspecified type                Past Medical History:  Past  Medical History:   Diagnosis Date   • Appetite loss    • Brain bleed (CMS/HCC)    • Carpal tunnel syndrome on left     carpal tunnel on left hand   • Diabetic neuropathy (CMS/HCC)    • DM2 (diabetes mellitus, type 2) (CMS/HCC)    • History of angina    • Hyperlipidemia    • Hypogonadism in male    • Obesity    • Sleep apnea    • Stroke (CMS/HCC)    • Unsteady gait      Past Surgical History:  Past Surgical History:   Procedure Laterality Date   • ANGIOPLASTY      X2   • APPENDECTOMY     • CARDIAC CATHETERIZATION  11/13/2015   • CARPAL TUNNEL RELEASE Left 04/29/2018    carpal tunnel- lt hand// other hand surgeries    • CATARACT EXTRACTION, BILATERAL  2002    Dr. Lux Acosta   • CHOLECYSTECTOMY     • COLON RESECTION  2005   • CORONARY ANGIOPLASTY     • CORONARY ANGIOPLASTY WITH STENT PLACEMENT  11/13/2015    PTCA stent to proximal in stent and mid to distal lad   • CORONARY ANGIOPLASTY WITH STENT PLACEMENT  09/16/2016    PTCA stent to mid lad and stent to vein graft to marginal   • CORONARY ARTERY BYPASS GRAFT  2005    @ [facility]   • CYST REMOVAL      cyst removed from scrotum   • FOOT SURGERY Right 07/17/2018   • FOOT SURGERY Left 02/04/2019   • TOE AMPUTATION Right     right toe removed D/T infected cut that went to the bone      Allergies:  Allergies   Allergen Reactions   • Codeine Itching     Home Meds:  Medications Prior to Admission   Medication Sig Dispense Refill Last Dose   • aspirin 81 MG chewable tablet Chew 81 mg Daily.   5/6/2021 at Unknown time   • atenolol (TENORMIN) 100 MG tablet Take 100 mg by mouth Daily.      • atorvastatin (LIPITOR) 40 MG tablet Take 40 mg by mouth Every Night.   5/5/2021 at Unknown time   • bumetanide (BUMEX) 1 MG tablet Take 1 mg by mouth Every Morning.   5/6/2021 at Unknown time   • digoxin (LANOXIN) 125 MCG tablet Take 125 mcg by mouth Daily. In the afternoon   5/5/2021 at Unknown time   • donepezil (ARICEPT) 10 MG tablet Take 10 mg by mouth Every Night.   5/5/2021 at Unknown time    • DULoxetine (CYMBALTA) 60 MG capsule Take 60 mg by mouth Daily.   5/6/2021 at Unknown time   • gabapentin (NEURONTIN) 100 MG capsule Take 200 mg by mouth 2 (two) times a day.   5/6/2021 at Unknown time   • Glucagon, rDNA, (Glucagon Emergency) 1 MG kit Inject 1 mg as directed 1 (One) Time As Needed (hypoglycemia).      • hydrALAZINE (APRESOLINE) 50 MG tablet Take 50 mg by mouth 2 (two) times a day. Hold for SBP <110   5/6/2021 at Unknown time   • HYDROcodone-acetaminophen (NORCO) 7.5-325 MG per tablet Take 1 tablet by mouth Every 12 (Twelve) Hours As Needed for Mild Pain  or Moderate Pain .      • insulin NPH-insulin regular (humuLIN 70/30,novoLIN 70/30) (70-30) 100 UNIT/ML injection Inject 30 Units under the skin into the appropriate area as directed 2 (Two) Times a Day With Meals.   5/6/2021 at Unknown time   • isosorbide mononitrate (IMDUR) 60 MG 24 hr tablet Take 60 mg by mouth 2 (Two) Times a Day.   5/6/2021 at Unknown time   • metFORMIN ER (GLUCOPHAGE-XR) 500 MG 24 hr tablet Take 500 mg by mouth 2 (two) times a day.   5/6/2021 at Unknown time   • Omega-3 Fatty Acids (fish oil) 1000 MG capsule capsule Take 1,000 mg by mouth Daily.   5/6/2021 at Unknown time   • Skin Protectants, Misc. (eucerin) cream Apply 1 application topically to the appropriate area as directed 2 (Two) Times a Day As Needed for Dry Skin. To upper & lower extremities      • ticagrelor (BRILINTA) 90 MG tablet tablet Take 90 mg by mouth 2 (Two) Times a Day.   5/6/2021 at Unknown time   • tiotropium (Spiriva HandiHaler) 18 MCG per inhalation capsule Place 1 capsule into inhaler and inhale Daily. 30 capsule 1 5/6/2021 at Unknown time   • vitamin B-12 (CYANOCOBALAMIN) 1000 MCG tablet Take 1,000 mcg by mouth Daily.   5/6/2021 at Unknown time     Current Meds:     Current Facility-Administered Medications:   •  acetaminophen (TYLENOL) tablet 650 mg, 650 mg, Oral, Q4H PRN **OR** acetaminophen (TYLENOL) 160 MG/5ML solution 650 mg, 650 mg, Oral, Q4H  PRN **OR** acetaminophen (TYLENOL) suppository 650 mg, 650 mg, Rectal, Q4H PRN, Rayne Bah APRN  •  albuterol (PROVENTIL) nebulizer solution 0.083% 2.5 mg/3mL, 2.5 mg, Nebulization, Q6H PRN, Rayne Bah, FABIOLA  •  aluminum-magnesium hydroxide-simethicone (MAALOX MAX) 400-400-40 MG/5ML suspension 15 mL, 15 mL, Oral, Q6H PRN, Rayne Bah, APRN  •  aspirin chewable tablet 81 mg, 81 mg, Oral, Daily, Rayne Bah APRN, 81 mg at 05/08/21 0844  •  atenolol (TENORMIN) tablet 100 mg, 100 mg, Oral, Daily, Rayne Bah APRN, 100 mg at 05/08/21 0844  •  atorvastatin (LIPITOR) tablet 40 mg, 40 mg, Oral, Nightly, Rayne Bah APRN, 40 mg at 05/07/21 2050  •  [START ON 5/9/2021] bumetanide (BUMEX) tablet 1 mg, 1 mg, Oral, Daily, Jose Guadalupe Corea, DO  •  dextrose (D50W) 25 g/ 50mL Intravenous Solution 25 g, 25 g, Intravenous, Q15 Min PRN, Rayne Bah APRN, 25 g at 05/08/21 0215  •  dextrose (GLUTOSE) oral gel 15 g, 15 g, Oral, Q15 Min PRN, Rayne Bah APRN  •  digoxin (LANOXIN) tablet 125 mcg, 125 mcg, Oral, Daily, Rayne Bah APRN, 125 mcg at 05/07/21 1258  •  donepezil (ARICEPT) tablet 10 mg, 10 mg, Oral, Nightly, Rayne Bah APRN, 10 mg at 05/07/21 2051  •  DULoxetine (CYMBALTA) DR capsule 60 mg, 60 mg, Oral, Daily, Rayne Bah, APRN, 60 mg at 05/08/21 0845  •  glucagon (human recombinant) (GLUCAGEN DIAGNOSTIC) injection 1 mg, 1 mg, Subcutaneous, PRN, Rayne Bah APRN  •  hydrALAZINE (APRESOLINE) tablet 10 mg, 10 mg, Oral, Q12H, Jose Guadalupe Corea, DO, 10 mg at 05/08/21 0845  •  HYDROcodone-acetaminophen (NORCO) 7.5-325 MG per tablet 1 tablet, 1 tablet, Oral, Q12H PRN, Rayne Bah APRN, 1 tablet at 05/07/21 2116  •  insulin lispro (ADMELOG) injection 0-9 Units, 0-9 Units, Subcutaneous, TID AC, 2 Units at 05/07/21 1259 **AND** insulin lispro (ADMELOG) injection 0-9 Units, 0-9 Units, Subcutaneous, PRN, Rayne Bah APRN  •  insulin NPH-insulin regular (humuLIN 70/30,novoLIN 70/30) injection  30 Units, 30 Units, Subcutaneous, BID With Meals, Rayne Bah APRN, 30 Units at 05/08/21 0844  •  isosorbide mononitrate (IMDUR) 24 hr tablet 60 mg, 60 mg, Oral, BID, Rayne Bah APRN, 60 mg at 05/08/21 0845  •  Magnesium Sulfate 2 gram infusion - Mg less than or equal to 1.5 mg/dL, 2 g, Intravenous, PRN **OR** Magnesium Sulfate 1 gram infusion - Mg 1.6-1.9 mg/dL, 1 g, Intravenous, PRN, Rayne Bah, FABIOLA  •  melatonin tablet 5 mg, 5 mg, Oral, Nightly PRN, Rayne Bah APRN  •  nitroglycerin (NITROSTAT) SL tablet 0.4 mg, 0.4 mg, Sublingual, Q5 Min PRN, Rayne Bah APRN  •  ondansetron (ZOFRAN) tablet 4 mg, 4 mg, Oral, Q6H PRN **OR** ondansetron (ZOFRAN) injection 4 mg, 4 mg, Intravenous, Q6H PRN, Rayne Bah APRLIVIA  •  potassium chloride (K-DUR,KLOR-CON) CR tablet 40 mEq, 40 mEq, Oral, PRN, Rayne Bah APRN  •  [COMPLETED] Insert peripheral IV, , , Once **AND** sodium chloride 0.9 % flush 10 mL, 10 mL, Intravenous, PRN, Jacob Amador MD  •  sodium chloride 0.9 % flush 10 mL, 10 mL, Intravenous, Q12H, Rayne Bah APRN, 10 mL at 05/08/21 0844  •  vitamin B-12 (CYANOCOBALAMIN) tablet 1,000 mcg, 1,000 mcg, Oral, Daily, Rayne Bah APRN, 1,000 mcg at 05/08/21 0845  Social History:   Social History     Tobacco Use   • Smoking status: Former Smoker     Packs/day: 1.00     Types: Cigarettes   • Smokeless tobacco: Never Used   Substance Use Topics   • Alcohol use: No      Family History:  Family History   Problem Relation Age of Onset   • Heart disease Mother    • Heart disease Father    • Diabetes Sister    • Heart disease Sister    • Diabetes Brother    • Mental illness Brother         Review of Systems : Review of Systems   Constitutional: Positive for malaise/fatigue. Negative for decreased appetite, diaphoresis and fever.   Cardiovascular: Positive for chest pain, dyspnea on exertion, leg swelling and orthopnea. Negative for irregular heartbeat, palpitations and syncope.  "  Respiratory: Positive for shortness of breath. Negative for cough.    Musculoskeletal: Positive for back pain.   Gastrointestinal: Negative for abdominal pain, melena, nausea and vomiting.   Neurological: Positive for focal weakness, loss of balance and weakness. Negative for dizziness.        CVA in Nov with left side weakness and mobility/balance issues    Psychiatric/Behavioral: Positive for memory loss.   All other systems reviewed and are negative.         Constitutional:  Temp:  [96 °F (35.6 °C)-98.3 °F (36.8 °C)] 97.5 °F (36.4 °C)  Heart Rate:  [61-87] 87  Resp:  [18-20] 18  BP: ()/(41-58) 120/56    Physical Exam   /56   Pulse 87   Temp 97.5 °F (36.4 °C) (Oral)   Resp 18   Ht 177.8 cm (70\")   Wt 116 kg (256 lb 13.4 oz)   SpO2 91%   BMI 36.85 kg/m²   Vitals and nursing note reviewed.   Constitutional:       Appearance: Not in distress. Frail.   Neck:      Vascular: No JVR. JVD normal.   Pulmonary:      Effort: Increased respiratory effort.      Breath sounds: Decreased air movement present. Decreased breath sounds present. No wheezing. No rhonchi. No rales.   Chest:      Chest wall: Not tender to palpatation.   Cardiovascular:      PMI at left midclavicular line. Normal rate. Irregularly irregular rhythm. Normal S1. Normal S2.      Murmurs: There is no murmur.      No gallop. No click. No rub.   Pulses:     Intact distal pulses.   Edema:     Peripheral edema present.     Thigh: bilateral trace edema of the thigh.     Pretibial: bilateral 1+ edema of the pretibial area.     Ankle: bilateral 2+ edema of the ankle.     Feet: bilateral 2+ edema of the feet.  Abdominal:      General: Bowel sounds are normal.      Palpations: Abdomen is soft.      Tenderness: There is no abdominal tenderness.   Musculoskeletal:         General: No tenderness. Skin:     General: Skin is warm and dry.   Neurological:      General: No focal deficit present.      Mental Status: Alert and oriented to person, place " and time.           Cardiographics  ECG: EKG tracing was  personally reviewed by me  ECG 12 Lead   Final Result   HEART RATE= 78  bpm   RR Interval= 766  ms   SC Interval=   ms   P Horizontal Axis=   deg   P Front Axis=   deg   QRSD Interval= 106  ms   QT Interval= 390  ms   QRS Axis= 55  deg   T Wave Axis= 194  deg   - ABNORMAL ECG -   Atrial fibrillation   Ventricular premature complex   Repol abnrm suggests ischemia, diffuse leads   When compared with ECG of 06-May-2021 20:11:31,   Significant rate decrease   Electronically Signed By: Estefany Hyde ([facility]) 07-May-2021 16:47:49   Date and Time of Study: 2021-05-07 04:48:57      ECG 12 Lead   Preliminary Result   HEART RATE= 134  bpm   RR Interval= 448  ms   SC Interval= 91  ms   P Horizontal Axis= 160  deg   P Front Axis= 257  deg   QRSD Interval= 97  ms   QT Interval= 305  ms   QRS Axis= 63  deg   T Wave Axis= 225  deg   - ABNORMAL ECG -   Sinus or ectopic atrial tachycardia   Atrial premature complex   Repol abnrm suggests ischemia, diffuse leads   When compared with ECG of 06-May-2021 17:49:11,   No significant change   Electronically Signed By:    Date and Time of Study: 2021-05-06 20:11:31      ECG 12 Lead   Preliminary Result   HEART RATE= 134  bpm   RR Interval= 448  ms   SC Interval= 126  ms   P Horizontal Axis=   deg   P Front Axis= 79  deg   QRSD Interval= 103  ms   QT Interval= 302  ms   QRS Axis= 62  deg   T Wave Axis= 223  deg   - ABNORMAL ECG -   Sinus tachycardia   Repol abnrm suggests ischemia, diffuse leads   Electronically Signed By:    Date and Time of Study: 2021-05-06 17:49:11      ECG 12 Lead    (Results Pending)       Telemetry: afib rate 90-100s    Echocardiogram:   Results for orders placed during the hospital encounter of 05/06/21    Adult Transthoracic Echo Complete w/ Color, Spectral and Contrast if necessary per protocol    Interpretation Summary  Technically difficult study due to poor acoustic windows, not all left ventricular  walls are well visualized therefore Lumason contrast was given to assess LV function..  LVE with basal inferior wall hypokinesis and septal dyskinesis, estimated LV ejection fraction of 35%  Normal RV size  Biatrial enlargement seen.  Aortic valve, mitral valve, tricuspid valve appears structurally normal, no significant regurgitation seen.  No pericardial effusion seen.  Proximal aorta appears normal in size.      Imaging  Chest X-ray:   Imaging Results (Last 24 Hours)     ** No results found for the last 24 hours. **          Lab Review: I have reviewed the labs  Results from last 7 days   Lab Units 05/07/21  0558 05/07/21  0025 05/06/21  1811   TROPONIN T ng/mL 0.044* 0.032* 0.035*     Results from last 7 days   Lab Units 05/08/21  0258   MAGNESIUM mg/dL 1.9     Results from last 7 days   Lab Units 05/08/21  0258   SODIUM mmol/L 141   POTASSIUM mmol/L 4.4   BUN mg/dL 48*   CREATININE mg/dL 2.02*   CALCIUM mg/dL 8.6     @[id number]@  Results from last 7 days   Lab Units 05/07/21  0558 05/06/21  1811   WBC 10*3/mm3 6.80 8.30   HEMOGLOBIN g/dL 7.7* 8.5*   HEMATOCRIT % 23.2* 25.8*   PLATELETS 10*3/mm3 270 286     Results from last 7 days   Lab Units 05/06/21  1811   INR  0.99   APTT seconds 23.9*             Estefany Hyde MD  5/8/2021, 12:30 EDT      EMR Dragon/Transcription:   Dictated utilizing Dragon dictation

## 2021-05-08 NOTE — PLAN OF CARE
Problem: Adult Inpatient Plan of Care  Goal: Plan of Care Review  Outcome: Ongoing, Progressing  Flowsheets (Taken 5/8/2021 1025)  Plan of Care Reviewed With: patient  Outcome Summary: Pt on room air. CT head ordered for today. Plan to restart eliquis at discharge. No distress noted. Will continue to monitor.   Goal Outcome Evaluation:  Plan of Care Reviewed With: patient     Outcome Summary: Pt on room air. CT head ordered for today. Plan to restart eliquis at discharge. No distress noted. Will continue to monitor.

## 2021-05-08 NOTE — PLAN OF CARE
Goal Outcome Evaluation:    Pt remains off cardizem gtt and in SR and sinus suzanne when sleeping. Neurology consulted today per cardiology to okay blood thinner for d/c. Pt resting comfortably. Will continue to monitor.

## 2021-05-08 NOTE — PLAN OF CARE
Goal Outcome Evaluation:  Plan of Care Reviewed With: patient     Outcome Summary: Pt on room air. CT head ordered for today. Plan to restart eliquis at discharge. No distress noted. Will continue to monitor.

## 2021-05-09 ENCOUNTER — READMISSION MANAGEMENT (OUTPATIENT)
Dept: CALL CENTER | Facility: HOSPITAL | Age: 71
End: 2021-05-09

## 2021-05-09 LAB
QT INTERVAL: 302 MS
QT INTERVAL: 305 MS

## 2021-05-09 NOTE — OUTREACH NOTE
Prep Survey      Responses   Denominational facility patient discharged from?  [facility]   Is LACE score < 7 ?  No   Emergency Room discharge w/ pulse ox?  No   Eligibility  Readm Mgmt   Discharge diagnosis  A fib with RVR,  Acute on chronic HFrEF due to A. Fib/hypertension   Does the patient have one of the following disease processes/diagnoses(primary or secondary)?  CHF   Does the patient have Home health ordered?  Yes   What is the Home health agency?   [facility]   Is there a DME ordered?  No   Prep survey completed?  Yes          Umm Driver RN

## 2021-05-10 NOTE — CASE MANAGEMENT/SOCIAL WORK
Case Management Discharge Note      Final Note: [facility]         Selected Continued Care - Discharged on 5/8/2021 Admission date: 5/6/2021 - [place] [facility] [place] Disposition Code: 06 - home with home health care

## 2021-05-10 NOTE — PAYOR COMM NOTE
"Clinicals for inpatient admission  RE: ref #  [id number]    UTILIZATION REVIEW  RETURN CONTACT:  BRENDA ESCOBEDO RN [facility]  PH: [phone]  FAX: [phone]  [facility]  NPI# [id number]  TAX ID # [id number]  =============================      Inpatient order 5/6  ER admit with new onset A.Fib w/RVR  Hx: CABG  V/s: Heart rate 130s on admit - now in the 90s, oxygen at 4L   Meds: oral Bumex BID, IV Lasix TID, IV Cardizem drip  Labs: elevated troponin levels 0.032 > 0.044,  BNP 5,625 > 6,011, Creatinine 1.79 > 1.72  -----  [place]:  Atrial Fibrillation (M-505)  Admission is indicated for 1 or more of the following  Myocardial ischemia that persists despite observation care   Heart failure (eg, pulmonary edema with dyspnea, Tachypnea, or Hypoxemia) that persists despite observation care  -----  Inpatient criteria met due to BNP & troponin trending up and patient requiring oral Bumex BID and IV lasix TID to manage CHF                 Benson Mclean (70 y.o. Male)     Date of Birth Social Security Number Address Home Phone N    1950  [address] [place] [address] [phone] [id number]    [employer] Marital Status          Episcopal        Admission Date Admission Type Admitting Provider Attending Provider Department, Room/Bed    5/6/21 Emergency Jose Guadalupe Croea DO  [facility], 2112/1    Discharge Date Discharge Disposition Discharge Destination        5/8/2021 Home-Health Care Svc              Attending Provider: (none)   Allergies: Codeine    Isolation: None   Infection: None   Code Status: Prior    Ht: 177.8 cm (70\")   Wt: 116 kg (256 lb 13.4 oz)    Admission Cmt: None   Principal Problem: Paroxysmal atrial fibrillation with rapid ventricular response (CMS/HCC) [I48.0]                 Active Insurance as of 5/6/2021     Primary Coverage     Payor Plan Insurance Group Employer/Plan Group    HUMANA MEDICARE REPLACEMENT HUMANA MEDICARE REPLACEMENT [id number]     Payor " Plan Address Payor Plan Phone Number Payor Plan Fax Number Effective Dates    [address] [address] [phone]  2018 - None Entered    formerly [facility] [id number]       Subscriber Name Subscriber Birth Date Member ID       BENSON MCLEAN 1950 [id number]                 Emergency Contacts      (Rel.) Home Phone Work Phone Mobile Phone    SHAUNA MCLEAN (Spouse) [phone] -- [phone]               History & Physical      Rayne Bah APRN at 21     Attestation signed by Jose Guadalupe Corea DO at 21 1458    For this patient encounter I reviewed the mid-level provider documentation, medical decision making, and treatment plan, and I agree.                          [facility] Medicine Services      Patient Name: Benson Mclean  : 1950  MRN: [id number]  Primary Care Physician: Samantha Zabala APRN  Date of admission: 2021    Patient Care Team:  Samantha Zabala APRN as PCP - General  Samantha Zabala APRN as PCP - Family Medicine  Jose G Rm MD as Consulting Physician (Cardiology)          Subjective   History Present Illness     Chief Complaint:   Chief Complaint   Patient presents with   • Shortness of Breath     Chief complaint: Dyspnea      History of present illness:      Mr. Mclean is a 70 y.o. male W/PMH of A. fib, HTN, HLD, CAD, DM, COPD, CHF, CABG, CVA, right hemiplegia, immobility syndrome, neuropathy, depressive disorder, dementia, constipation who presents to [facility] ED due to dyspnea.  Patient has pronounced cognitive deficit, residual from CVA in 2020, and is a poor historian.  ED physician reports patient was recently discharged from rehabilitation facility where he recovered from his CVA.  Home health nurse noted elevated heart rate during visit today and patient was brought to [facility] ED.      In the ED vitals 98.6 temp, , RR 16, /82, O2 sat 97% RA.  Labs notable for troponin 0.035,  , glucose 178, , K4.4, BUN 45, creatinine 1.79, ALT 12, AST 12, total bili 0.6, INR 0.99, WBC 8.3, Hgb 8.5, platelets 286, neutrophils 57.9.  Digoxin level less than 0.30.  Chest x-ray shows the heart is enlarged with changes of midline sternotomy, pulmonary vascular markings are within normal limits, lungs are clear, no active disease stable chest.  Patient treated with 250 mcg IV digoxin, 10 mg IV push Cardizem, Cardizem drip in the ED.          History of Present Illness    Review of Systems   Unable to perform ROS: dementia           Personal History     Past Medical History:   Past Medical History:   Diagnosis Date   • Appetite loss    • Brain bleed (CMS/HCC)    • Carpal tunnel syndrome on left     carpal tunnel on left hand   • Diabetic neuropathy (CMS/HCC)    • DM2 (diabetes mellitus, type 2) (CMS/HCC)    • History of angina    • Hyperlipidemia    • Hypogonadism in male    • Obesity    • Sleep apnea    • Stroke (CMS/HCC)    • Unsteady gait        Surgical History:      Past Surgical History:   Procedure Laterality Date   • ANGIOPLASTY      X2   • APPENDECTOMY     • CARDIAC CATHETERIZATION  11/13/2015   • CARPAL TUNNEL RELEASE Left 04/29/2018    carpal tunnel- lt hand// other hand surgeries    • CATARACT EXTRACTION, BILATERAL  2002    Dr. Lux Acosta   • CHOLECYSTECTOMY     • COLON RESECTION  2005   • CORONARY ANGIOPLASTY     • CORONARY ANGIOPLASTY WITH STENT PLACEMENT  11/13/2015    PTCA stent to proximal in stent and mid to distal lad   • CORONARY ANGIOPLASTY WITH STENT PLACEMENT  09/16/2016    PTCA stent to mid lad and stent to vein graft to marginal   • CORONARY ARTERY BYPASS GRAFT  2005    @ [facility]   • CYST REMOVAL      cyst removed from scrotum   • FOOT SURGERY Right 07/17/2018   • FOOT SURGERY Left 02/04/2019   • TOE AMPUTATION Right     right toe removed D/T infected cut that went to the bone           Family History: family history includes Diabetes in his brother and sister; Heart disease in  his father, mother, and sister; Mental illness in his brother. Otherwise pertinent FHx was reviewed and unremarkable.     Social History:  reports that he has quit smoking. His smoking use included cigarettes. He smoked 1.00 pack per day. He has never used smokeless tobacco. He reports current drug use. Frequency: 1.00 time per week. Drug: Marijuana. He reports that he does not drink alcohol.      Medications:  Prior to Admission medications    Medication Sig Start Date End Date Taking? Authorizing Provider   aspirin 81 MG chewable tablet Chew 81 mg Daily.   Yes Timo Austin MD   atenolol (TENORMIN) 100 MG tablet Take 100 mg by mouth Daily.   Yes Timo Austin MD   atorvastatin (LIPITOR) 40 MG tablet Take 40 mg by mouth Every Night.   Yes Timo Austin MD   bumetanide (BUMEX) 1 MG tablet Take 1 mg by mouth Every Morning.   Yes Timo Austin MD   digoxin (LANOXIN) 125 MCG tablet Take 125 mcg by mouth Daily. In the afternoon   Yes Timo Austin MD   donepezil (ARICEPT) 10 MG tablet Take 10 mg by mouth Every Night.   Yes Timo Austin MD   DULoxetine (CYMBALTA) 60 MG capsule Take 60 mg by mouth Daily. 9/28/20  Yes Timo Austin MD   gabapentin (NEURONTIN) 100 MG capsule Take 200 mg by mouth 2 (two) times a day.   Yes Timo Austin MD   Glucagon rDNA, (Glucagon Emergency) 1 MG kit Inject 1 mg as directed 1 (One) Time As Needed (hypoglycemia).   Yes Timo Austin MD   hydrALAZINE (APRESOLINE) 50 MG tablet Take 50 mg by mouth 2 (two) times a day. Hold for SBP <110   Yes Timo Austin MD   HYDROcodone-acetaminophen (NORCO) 7.5-325 MG per tablet Take 1 tablet by mouth Every 12 (Twelve) Hours As Needed for Mild Pain  or Moderate Pain .   Yes Timo Austin MD   insulin NPH-insulin regular (humuLIN 70/30,novoLIN 70/30) (70-30) 100 UNIT/ML injection Inject 30 Units under the skin into the appropriate area as directed 2 (Two) Times a Day  With Meals.   Yes Timo Austin MD   isosorbide mononitrate (IMDUR) 60 MG 24 hr tablet Take 60 mg by mouth 2 (Two) Times a Day.   Yes Timo Austin MD   metFORMIN ER (GLUCOPHAGE-XR) 500 MG 24 hr tablet Take 500 mg by mouth 2 (two) times a day.   Yes Timo Austin MD   Omega-3 Fatty Acids (fish oil) 1000 MG capsule capsule Take 1,000 mg by mouth Daily.   Yes Timo Austin MD   Skin Protectants, Misc. (eucerin) cream Apply 1 application topically to the appropriate area as directed 2 (Two) Times a Day As Needed for Dry Skin. To upper & lower extremities   Yes Timo Austin MD   ticagrelor (BRILINTA) 90 MG tablet tablet Take 90 mg by mouth 2 (Two) Times a Day.   Yes Timo Austin MD   tiotropium (Spiriva HandiHaler) 18 MCG per inhalation capsule Place 1 capsule into inhaler and inhale Daily. 6/22/20  Yes Grayson Verduzco MD   vitamin B-12 (CYANOCOBALAMIN) 1000 MCG tablet Take 1,000 mcg by mouth Daily.   Yes Timo Austin MD   butalbital-acetaminophen-caffeine (FIORICET, ESGIC) -40 MG per tablet Take 1 tablet by mouth Every 4 (Four) Hours As Needed for Headache. 12/1/20 5/6/21  Mari Bernal MD   clopidogrel (PLAVIX) 75 MG tablet Take 75 mg by mouth Daily.  5/6/21  Timo Austin MD   enalapril (VASOTEC) 5 MG tablet Take 5 mg by mouth Daily. 6/4/19 5/6/21  Timo Austin MD   HYDROcodone-acetaminophen (NORCO)  MG per tablet Take 1 tablet by mouth 2 (Two) Times a Day As Needed for Moderate Pain . 12/1/20 5/6/21  Mari Bernal MD   metFORMIN (GLUCOPHAGE) 500 MG tablet Take 500 mg by mouth 2 (Two) Times a Day With Meals.  5/6/21  Timo Austin MD       Allergies:    Allergies   Allergen Reactions   • Codeine Itching       Objective   Objective     Vital Signs  Temp:  [97.8 °F (36.6 °C)-98.6 °F (37 °C)] 97.8 °F (36.6 °C)  Heart Rate:  [] 86  Resp:  [16-18] 16  BP: (107-155)/(45-82) 107/45  SpO2:  [93 %-99 %] 97 %   on  Flow (L/min):  [4] 4;   Device (Oxygen Therapy): nasal cannula  Body mass index is 37.45 kg/m².    Physical Exam  Vitals and nursing note reviewed.   Constitutional:       Appearance: He is obese.   HENT:      Head: Normocephalic and atraumatic.      Right Ear: External ear normal.      Left Ear: External ear normal.      Nose: Nose normal.      Mouth/Throat:      Mouth: Mucous membranes are moist.      Pharynx: Oropharynx is clear.   Eyes:      General: No scleral icterus.  Cardiovascular:      Rate and Rhythm: Tachycardia present. Rhythm irregular.      Pulses: Normal pulses.      Heart sounds: Normal heart sounds.   Pulmonary:      Breath sounds: Examination of the right-upper field reveals rales. Examination of the right-middle field reveals wheezing. Examination of the right-lower field reveals rales. Examination of the left-lower field reveals rales. Wheezing and rales present.   Abdominal:      General: Bowel sounds are normal.      Palpations: Abdomen is soft.   Musculoskeletal:      Cervical back: Normal range of motion.      Right lower le+ Edema present.      Left lower le+ Edema present.   Skin:     General: Skin is warm and dry.   Neurological:      Mental Status: Mental status is at baseline. He is disoriented.   Psychiatric:         Attention and Perception: He is inattentive.         Speech: Speech is delayed.         Behavior: Behavior is slowed. Behavior is cooperative.         Cognition and Memory: Cognition is impaired. Memory is impaired.           Results Review:  I have personally reviewed most recent cardiac tracings and radiographic results and agree with findings.        Results from last 7 days   Lab Units 21  1811   WBC 10*3/mm3 8.30   HEMOGLOBIN g/dL 8.5*   HEMATOCRIT % 25.8*   PLATELETS 10*3/mm3 286   INR  0.99     Results from last 7 days   Lab Units 21  0025 21  1811   SODIUM mmol/L  --  142   POTASSIUM mmol/L  --  4.4   CHLORIDE mmol/L  --  102   CO2  mmol/L  --  24.0   BUN mg/dL  --  45*   CREATININE mg/dL  --  1.79*   GLUCOSE mg/dL  --  178*   CALCIUM mg/dL  --  9.1   ALT (SGPT) U/L  --  12   AST (SGOT) U/L  --  12   TROPONIN T ng/mL 0.032* 0.035*   PROBNP pg/mL 6,011.0* 5,625.0*     Estimated Creatinine Clearance: 49.4 mL/min (A) (by C-G formula based on SCr of 1.79 mg/dL (H)).  Brief Urine Lab Results     None          Microbiology Results (last 10 days)     ** No results found for the last 240 hours. **          ECG/EMG Results (most recent)     Procedure Component Value Units Date/Time    ECG 12 Lead [[id number]] Collected: 05/06/21 1749     Updated: 05/06/21 1750     QT Interval 302 ms     Narrative:      HEART RATE= 134  bpm  RR Interval= 448  ms  PA Interval= 126  ms  P Horizontal Axis=   deg  P Front Axis= 79  deg  QRSD Interval= 103  ms  QT Interval= 302  ms  QRS Axis= 62  deg  T Wave Axis= 223  deg  - ABNORMAL ECG -  Sinus tachycardia  Repol abnrm suggests ischemia, diffuse leads  Electronically Signed By:   Date and Time of Study: 2021-05-06 17:49:11    ECG 12 Lead [[id number]] Collected: 05/06/21 2011     Updated: 05/06/21 2014     QT Interval 305 ms     Narrative:      HEART RATE= 134  bpm  RR Interval= 448  ms  PA Interval= 91  ms  P Horizontal Axis= 160  deg  P Front Axis= 257  deg  QRSD Interval= 97  ms  QT Interval= 305  ms  QRS Axis= 63  deg  T Wave Axis= 225  deg  - ABNORMAL ECG -  Sinus or ectopic atrial tachycardia  Atrial premature complex  Repol abnrm suggests ischemia, diffuse leads  When compared with ECG of 06-May-2021 17:49:11,  No significant change  Electronically Signed By:   Date and Time of Study: 2021-05-06 20:11:31    ECG 12 Lead [[id number]] Collected: 05/07/21 0448     Updated: 05/07/21 0450     QT Interval 390 ms     Narrative:      HEART RATE= 78  bpm  RR Interval= 766  ms  PA Interval=   ms  P Horizontal Axis=   deg  P Front Axis=   deg  QRSD Interval= 106  ms  QT Interval= 390  ms  QRS Axis= 55  deg  T Wave Axis= 194  deg  -  ABNORMAL ECG -  Atrial fibrillation  Ventricular premature complex  Repol abnrm suggests ischemia, diffuse leads  Electronically Signed By:   Date and Time of Study: 2021-05-07 04:48:57                  Results for orders placed during the hospital encounter of 11/27/20    Adult Transesophageal Echo (MARSHA) W/ Cont if Necessary Per Protocol (Cardiology Department)    Interpretation Summary  Date of study  11/30/2020    Procedure performed  Transesophageal echocardiogram.  Doppler color pulsed wave and continuous wave Doppler study    Indications  Recent stroke.  Assess for cardioembolic episodes.    Anesthesia  Provided by anesthesiologist with intravenous diprivan.    Procedure  Patient was brought to outpatient cardiovascular area in a fasting state and patient was given intravenous Diprivan by anesthesiologist.  MARSHA probe was passed without difficulty.    Patient tolerated the procedure well.  No complications were noted.    Results  Technically satisfactory study.  Mitral valve is thickened with adequate opening motion.  Mild mitral regurgitation is present.  Tricuspid valve is normal.  Aortic valve is tricuspid with adequate opening motion.  Minimal aortic regurgitation is present.  Biatrial enlargement is present.  Left ventricle is enlarged with diffuse hypocontractility with ejection fraction of 35 to 40%.  Smoking effect is present in the left atrium and left ventricle.  Left atrial appendage is free of clots except for presence of smoking effect.  No pericardial effusion or intracardiac thrombus is seen.  Atrial septum is intact.  No PFO is present.  Aorta is normal.    Impression  Biatrial enlargement.  Smoking effect in the left atrium and left ventricle and left atrial appendage.  Mild mitral and aortic regurgitation.  Left ventricle enlargement with diffuse hypocontractility with ejection fraction of 35 to 40%.  ]]]]]]]]]]]]]]]]      XR Chest 1 View    Result Date: 5/6/2021  No active disease. Stable  chest  Electronically Signed By-Davis Dee MD On:5/6/2021 6:51 PM This report was finalized on [id number] by  Davis Dee MD.        Estimated Creatinine Clearance: 49.4 mL/min (A) (by C-G formula based on SCr of 1.79 mg/dL (H)).    Assessment/Plan   Assessment/Plan       Active Hospital Problems    Diagnosis  POA   • **Paroxysmal atrial fibrillation with rapid ventricular response (CMS/HCC) [I48.0]  Yes     Priority: High   • Acute congestive heart failure (CMS/HCC) [I50.9]  Yes     Priority: High   • Chronic obstructive pulmonary disease, unspecified (CMS/HCC) [J44.9]  Yes     Priority: Medium   • Essential hypertension [I10]  Yes     Priority: Medium   • Dyslipidemia [E78.5]  Yes     Priority: Medium   • Diabetes mellitus due to underlying condition without complication, without long-term current use of insulin (CMS/HCC) [E08.9]  Yes     Priority: Medium   • Coronary artery disease [I25.10]  Yes     Priority: Medium   • Obesity [E66.9]  Yes     Priority: Low   • Right hemiplegia (CMS/HCC) [G81.91]  Yes     Priority: Low   • History of cerebrovascular accident [Z86.73]  Not Applicable     Priority: Low   • Depressive disorder [F32.9]  Yes     Priority: Low   • Immobility syndrome [M62.3]  Yes     Priority: Low   • Hemiplegia, unspecified affecting right dominant side (CMS/HCC) [G81.91]  Yes     Priority: Low   • Unspecified dementia with behavioral disturbance (CMS/HCC) [F03.91]  Yes     Priority: Low   • Hx of CABG [Z95.1]  Not Applicable     Priority: Low   • ANNETTE treated with BiPAP [G47.33]  Yes     Priority: Low   • Diabetic neuropathy (CMS/HCC) [E11.40]  Yes     Priority: Low      Resolved Hospital Problems   No resolved problems to display.     Paroxysmal atrial fibrillation with rapid ventricular response:  -In the ED given 250 mcg of IV digoxin, 10 mg IV Cardizem and Cardizem drip started  -Continue Cardizem drip, titrate to maintain heart rate less than 120  -Initial troponin 0.035, trend troponin  levels every 6 hours x2  -Consult cardiology  -Continue home medication digoxin 125 mcg p.o. daily  -Continuous cardiac monitoring  -Trend EKG every 6 hours x2  -Echocardiogram ordered to evaluate LV function    Diabetes mellitus:  -Sliding scale insulin  -Continue home Lantus 30 units subcu twice daily  -Consistent carbohydrate diet  -Before meals and at bedtime Accu-Cheks  -Hypoglycemia protocol ordered  -Hold oral medications due to possible procedures    Essential hypertension:  -Continue home medication hydralazine 50 mg p.o. twice daily  -Tinea home medication atenolol 100 mg p.o. daily    Dyslipidemia:  -Continue home medication atorvastatin 40 mg p.o. nightly    Obesity:  -encourage dietary modifications    Coronary artery disease/history of stent/history of CABG  -Continue ASA 81 mg p.o. daily  -Continue Brilinta 90 mg p.o. twice daily  -Continue home medication isosorbide 60 mg p.o. twice daily    COPD:  -O2 as needed to keep sats greater than 90%  -Albuterol nebulized treatment every 6 hours as needed for shortness of air    Acute congestive heart failure:  -Secondary to hypertensive heart disease  -Echocardiogram on 11/30/2020 shows biatrial enlargement. Smoking effect in the left atrium and left ventricle and left atrial appendage. Mild mitral and aortic regurgitation. Left ventricle enlargement with diffuse hypocontractility with ejection fraction of 35 to 40%.  -Echocardiogram ordered to evaluate LV function, previous echocardiogram showed compensated congestive heart failure  -40 mg IV Lasix every 8 hours ordered    ANNETTE with BiPAP:  -O2 as needed to keep sats greater than 90%  -May use home CPAP nightly as needed    Immobility syndrome:  -Continue home medication hydrocodone-acetaminophen 7.5-325 mg p.o. twice daily as needed for pain  -Inspect verified    History of CVA/right hemiaplasia:  -CVA in November 2020    Diabetic neuropathy:  -Continue home medication Cymbalta 60 mg p.o.  daily    Depression:  -Continue home medication Cymbalta 60 mg p.o. daily    Unspecified dementia:  -Continue home medication Aricept 10 mg p.o. nightly                      VTE Prophylaxis -   Mechanical Order History:     None      Pharmalogical Order History:     None          CODE STATUS:    Code Status and Medical Interventions:   Ordered at: 05/06/21 2111     Level Of Support Discussed With:    Patient     Code Status:    CPR     Medical Interventions (Level of Support Prior to Arrest):    Full       This patient has been interviewed wearing appropriate personal protective equipment and findings discussed with the ED provider.        I discussed the patient's findings and my recommendations with the patient.        Signature: Electronically signed by FABIOLA Townsend, 05/07/21, 4:55 AM EDT.[facility] Hospitalist Team          Electronically signed by Jose Guadalupe Corea DO at 05/07/21 1458          Emergency Department Notes      Jazmyne Lanza at 05/06/21 1731        EKG requested        Jazmyne Lanza  05/06/21 1731      Electronically signed by Jazmyne Lanza at 05/06/21 1731     Elaine Sloan RN at 05/06/21 1740        Per spouse and pt reports pt seen by  physician today and with vitals check HR noted to be tachy and in Afib-spouse reports this is new. Pt c/o increased SOA, denies any chest pain or n/v   pt released from rehab last week d/t prior stroke back in November-has rt side deficit    Electronically signed by Elaine Sloan RN at 05/06/21 1742     Jacob Amador MD at 05/06/21 1741          Subjective   History of Present Illness  Rapid heart rate  70-year-old male says by home health today had rapid heart rate and then by primary care was found to have new onset atrial fibrillation with rapid ventricular response.  Patient denies palpitations or chest pain wife states she believes he has had some increased work of breathing over the last several days.  He reports no  syncope or near syncope.  Wife states review of systems is limited due to patient's difficulty with memory and thoughts since his stroke in November.  He reportedly was discharged from a long rehab stay last week.  Review of Systems   Unable to perform ROS: Mental status change   Constitutional: Negative for fever.   Cardiovascular: Negative for chest pain.   Gastrointestinal: Negative for abdominal pain.       Past Medical History:   Diagnosis Date   • Appetite loss    • Brain bleed (CMS/[facility])    • Carpal tunnel syndrome on left     carpal tunnel on left hand   • Diabetic neuropathy (CMS/HCC)    • DM2 (diabetes mellitus, type 2) (CMS/HCC)    • History of angina    • Hyperlipidemia    • Hypogonadism in male    • Obesity    • Sleep apnea    • Stroke (CMS/HCC)    • Unsteady gait        Allergies   Allergen Reactions   • Codeine Itching       Past Surgical History:   Procedure Laterality Date   • ANGIOPLASTY      X2   • APPENDECTOMY     • CARDIAC CATHETERIZATION  11/13/2015   • CARPAL TUNNEL RELEASE Left 04/29/2018    carpal tunnel- lt hand// other hand surgeries    • CATARACT EXTRACTION, BILATERAL  2002    Dr. Lux Acosta   • CHOLECYSTECTOMY     • COLON RESECTION  2005   • CORONARY ANGIOPLASTY     • CORONARY ANGIOPLASTY WITH STENT PLACEMENT  11/13/2015    PTCA stent to proximal in stent and mid to distal lad   • CORONARY ANGIOPLASTY WITH STENT PLACEMENT  09/16/2016    PTCA stent to mid lad and stent to vein graft to marginal   • CORONARY ARTERY BYPASS GRAFT  2005    @ [facility]   • CYST REMOVAL      cyst removed from scrotum   • FOOT SURGERY Right 07/17/2018   • FOOT SURGERY Left 02/04/2019   • TOE AMPUTATION Right     right toe removed D/T infected cut that went to the bone       Family History   Problem Relation Age of Onset   • Heart disease Mother    • Heart disease Father    • Diabetes Sister    • Heart disease Sister    • Diabetes Brother    • Mental illness Brother        Social History     Socioeconomic History   •  Marital status:      Spouse name: Not on file   • Number of children: Not on file   • Years of education: Not on file   • Highest education level: Not on file   Tobacco Use   • Smoking status: Former Smoker     Packs/day: 1.00     Types: Cigarettes   • Smokeless tobacco: Never Used   Vaping Use   • Vaping Use: Never used   Substance and Sexual Activity   • Alcohol use: No   • Drug use: Yes     Frequency: 1.0 times per week     Types: Marijuana     Comment: socially   • Sexual activity: Defer       Prior to Admission medications    Medication Sig Start Date End Date Taking? Authorizing Provider   aspirin 325 MG tablet Take 325 mg by mouth Daily.    Timo Austin MD   atenolol (TENORMIN) 100 MG tablet Take 100 mg by mouth Daily.    Timo Austin MD   atorvastatin (LIPITOR) 40 MG tablet Take 40 mg by mouth Daily.    Timo Austin MD   bumetanide (BUMEX) 2 MG tablet Take 2 mg by mouth 2 (Two) Times a Day.    Timo Austin MD   butalbital-acetaminophen-caffeine (FIORICET, ESGIC) -40 MG per tablet Take 1 tablet by mouth Every 4 (Four) Hours As Needed for Headache. 12/1/20   Mari Bernal MD   clopidogrel (PLAVIX) 75 MG tablet Take 75 mg by mouth Daily.    Timo Austin MD   digoxin (LANOXIN) 125 MCG tablet Take 125 mcg by mouth Daily.    Timo Austin MD   DULoxetine (CYMBALTA) 60 MG capsule Take 60 mg by mouth Daily. 9/28/20   Timo Austin MD   enalapril (VASOTEC) 5 MG tablet Take 5 mg by mouth Daily. 6/4/19   Timo Austin MD   HYDROcodone-acetaminophen (NORCO)  MG per tablet Take 1 tablet by mouth 2 (Two) Times a Day As Needed for Moderate Pain . 12/1/20   Mari Bernal MD   insulin NPH-insulin regular (humuLIN 70/30,novoLIN 70/30) (70-30) 100 UNIT/ML injection Inject 50 Units under the skin into the appropriate area as directed Daily.    Timo Austin MD   isosorbide mononitrate (IMDUR) 60 MG 24 hr tablet Take 60 mg  "by mouth 2 (Two) Times a Day.    Timo Austin MD   metFORMIN (GLUCOPHAGE) 500 MG tablet Take 500 mg by mouth 2 (Two) Times a Day With Meals.    Timo Austin MD   Omega-3 Fatty Acids (fish oil) 1000 MG capsule capsule Take 1,000 mg by mouth Daily.    Timo Austin MD   tiotropium (Spiriva HandiHaler) 18 MCG per inhalation capsule Place 1 capsule into inhaler and inhale Daily. 6/22/20   Grayson Verduzco MD     /71 (BP Location: Right arm, Patient Position: Sitting)   Pulse (!) 135   Temp 98.6 °F (37 °C) (Oral)   Resp 18   Ht 177.8 cm (70\")   Wt 122 kg (270 lb)   SpO2 94%   BMI 38.74 kg/m²   I examined the patient using the appropriate personal protective equipment.        Objective   Physical Exam  General: Obese male, chronically ill-appearing, awake and alert, no acute distress  Eyes: Pupils round and equal, sclera nonicteric  HEENT: Mucous membranes moist, no mucosal swelling  Neck: Supple, no nuchal rigidity, no lymphadenopathy  Respirations: Respirations nonlabored, equal breath sounds bilaterally, clear lungs  Heart increased rate, irregular rhythm, no murmurs rubs or gallops,   Abdomen soft nontender nondistended, no hepatosplenomegaly,   Extremities no clubbing cyanosis or edema, calves are symmetric and nontender  Neuro cranial nerves grossly intact, some chronic right-sided arm and leg weakness  Psych oriented to person and place, pleasant affect  Skin no rash, brisk cap refill  Procedures          ED Course      Results for orders placed or performed during the hospital encounter of 05/06/21   Comprehensive Metabolic Panel    Specimen: Blood   Result Value Ref Range    Glucose 178 (H) 65 - 99 mg/dL    BUN 45 (H) 8 - 23 mg/dL    Creatinine 1.79 (H) 0.76 - 1.27 mg/dL    Sodium 142 136 - 145 mmol/L    Potassium 4.4 3.5 - 5.2 mmol/L    Chloride 102 98 - 107 mmol/L    CO2 24.0 22.0 - 29.0 mmol/L    Calcium 9.1 8.6 - 10.5 mg/dL    Total Protein 6.6 6.0 - 8.5 g/dL    " Albumin 3.50 3.50 - 5.20 g/dL    ALT (SGPT) 12 1 - 41 U/L    AST (SGOT) 12 1 - 40 U/L    Alkaline Phosphatase 48 39 - 117 U/L    Total Bilirubin 0.6 0.0 - 1.2 mg/dL    eGFR Non African Amer 38 (L) >60 mL/min/1.73    Globulin 3.1 gm/dL    A/G Ratio 1.1 g/dL    BUN/Creatinine Ratio 25.1 (H) 7.0 - 25.0    Anion Gap 16.0 (H) 5.0 - 15.0 mmol/L   Protime-INR    Specimen: Blood   Result Value Ref Range    Protime 10.9 9.6 - 11.7 Seconds    INR 0.99 0.93 - 1.10   aPTT    Specimen: Blood   Result Value Ref Range    PTT 23.9 (L) 24.0 - 31.0 seconds   BNP    Specimen: Blood   Result Value Ref Range    proBNP 5,625.0 (H) 0.0 - 900.0 pg/mL   Troponin    Specimen: Blood   Result Value Ref Range    Troponin T 0.035 (C) 0.000 - 0.030 ng/mL   Magnesium    Specimen: Blood   Result Value Ref Range    Magnesium 1.8 1.6 - 2.4 mg/dL   TSH    Specimen: Blood   Result Value Ref Range    TSH 3.190 0.270 - 4.200 uIU/mL   Digoxin Level    Specimen: Blood   Result Value Ref Range    Digoxin <0.30 (L) 0.60 - 1.20 ng/mL   CBC Auto Differential    Specimen: Blood   Result Value Ref Range    WBC 8.30 3.40 - 10.80 10*3/mm3    RBC 2.76 (L) 4.14 - 5.80 10*6/mm3    Hemoglobin 8.5 (L) 13.0 - 17.7 g/dL    Hematocrit 25.8 (L) 37.5 - 51.0 %    MCV 93.3 79.0 - 97.0 fL    MCH 30.7 26.6 - 33.0 pg    MCHC 32.9 31.5 - 35.7 g/dL    RDW 14.3 12.3 - 15.4 %    RDW-SD 46.4 37.0 - 54.0 fl    MPV 7.9 6.0 - 12.0 fL    Platelets 286 140 - 450 10*3/mm3    Neutrophil % 57.9 42.7 - 76.0 %    Lymphocyte % 22.7 19.6 - 45.3 %    Monocyte % 10.5 5.0 - 12.0 %    Eosinophil % 7.4 (H) 0.3 - 6.2 %    Basophil % 1.5 0.0 - 1.5 %    Neutrophils, Absolute 4.80 1.70 - 7.00 10*3/mm3    Lymphocytes, Absolute 1.90 0.70 - 3.10 10*3/mm3    Monocytes, Absolute 0.90 0.10 - 0.90 10*3/mm3    Eosinophils, Absolute 0.60 (H) 0.00 - 0.40 10*3/mm3    Basophils, Absolute 0.10 0.00 - 0.20 10*3/mm3    nRBC 0.1 0.0 - 0.2 /100 WBC   ECG 12 Lead   Result Value Ref Range    QT Interval 302 ms   ECG 12  Lead   Result Value Ref Range    QT Interval 305 ms     XR Chest 1 View    Result Date: 5/6/2021  No active disease. Stable chest  Electronically Signed By-Davis Dee MD On:5/6/2021 6:51 PM This report was finalized on [id number] by  Davis Dee MD.    My EKG interpretation atrial fibrillation    Ventricular sponsor rate of 134                                     MDM  Patient presents with rapid heart rate.  This was discovered on physical exam by his caregiver he is essentially asymptomatic he is denies shortness of breath or chest pain or palpitations.  He has benign abdominal examination.  He is afebrile.  He does have anemia which has worsened from previous but he denies any bloody stools or active bleeding.  He was started on Cardizem bolus and drip titrated to effect with marginal improvement in his heart rate.  States he supposed to be on digoxin but his digoxin level is subtherapeutic he was ordered IV digoxin 0.25.  Troponin was borderline elevated, BNP elevated no congestive failure on the chest x-ray.  Patient is on Plavix.  Blood pressure was stable he was voicing understanding to the findings he is admitted for further evaluation.  Final diagnoses:   Atrial fibrillation with rapid ventricular response (CMS/HCC)   Anemia, unspecified type       ED Disposition  ED Disposition     ED Disposition Condition Comment    Decision to Admit  Level of Care: Progressive Care [20]   Diagnosis: Atrial fibrillation with rapid ventricular response (CMS/HCC) [683399]   Admitting Physician: ULISSES TALAVERA [[id number]]   Certification: I Certify That Inpatient Hospital Services Are Medically Necessary For Greater Than 2 Midnights            No follow-up provider specified.       Medication List      ASK your doctor about these medications    HYDROcodone-acetaminophen 7.5-325 MG per tablet  Commonly known as: NORCO  Ask about: Which instructions should I use?     metFORMIN  MG 24 hr tablet  Commonly known  as: GLUCOPHAGE-XR  Ask about: Which instructions should I use?             Jacob Amador MD  05/06/21 2122       Jacob Amador MD  05/06/21 2124      Electronically signed by Jacob Amador MD at 05/06/21 2124          Physician Progress Notes (last 72 hours) (Notes from 05/07/21 0639 through 05/10/21 0639)      Jose Guadalupe Corea DO at 05/07/21 1654                [facility] Medicine Services Daily Progress Note      Hospitalist Team  LOS 1 days      Patient Care Team:  Samantha Zabala APRN as PCP - General  Samantha Zabala APRN as PCP - Family Medicine  Jose G Rm MD as Consulting Physician (Cardiology)    Patient Location: 2112/1      Subjective   Subjective     Chief Complaint / Subjective  Chief Complaint   Patient presents with   • Shortness of Breath         Brief Synopsis of Hospital Course/HPI  Mr. Mclean is a 70 y.o. male W/PMH of A. fib, HTN, HLD, CAD, DM, COPD, CHF, CABG, CVA, right hemiplegia, immobility syndrome, neuropathy, depressive disorder, dementia, constipation who presents to [facility] ED due to dyspnea.  Patient has pronounced cognitive deficit, residual from CVA in November 2020, and is a poor historian.  ED physician reports patient was recently discharged from rehabilitation facility where he recovered from his CVA.  Home health nurse noted elevated heart rate during visit today and patient was brought to [facility] ED      Date::    5/7/2021: Reports heart rate more controlled today no palpitations.  Is having some lower extremity edema and some orthopnea      Review of Systems   Cardiovascular: Positive for orthopnea. Negative for chest pain and palpitations.         Objective   Objective      Vital Signs  Temp:  [97.8 °F (36.6 °C)-98.6 °F (37 °C)] 98.2 °F (36.8 °C)  Heart Rate:  [] 63  Resp:  [16-18] 18  BP: ()/(39-82) 119/53  Oxygen Therapy  SpO2: 99 %  Pulse Oximetry Type: Continuous  Device (Oxygen Therapy): nasal  "cannula  Flow (L/min): 4  Flowsheet Rows      First Filed Value   Admission Height  177.8 cm (70\") Documented at 05/06/2021 1720   Admission Weight  122 kg (270 lb) Documented at 05/06/2021 1720        Intake & Output (last 3 days)       05/04 0701 - 05/05 0700 05/05 0701 - 05/06 0700 05/06 0701 - 05/07 0700 05/07 0701 - 05/08 0700    P.O.    120    Total Intake(mL/kg)    120 (1)    Urine (mL/kg/hr)    350 (0.3)    Total Output    350    Net    -230                Lines, Drains & Airways    Active LDAs     Name:   Placement date:   Placement time:   Site:   Days:    Peripheral IV 05/06/21 1811 Left;Lower Forearm   05/06/21 1811    Forearm   less than 1                  Physical Exam:    Physical Exam  Eyes:      Pupils: Pupils are equal, round, and reactive to light.   Cardiovascular:      Rate and Rhythm: Normal rate. Rhythm irregular.   Pulmonary:      Effort: Pulmonary effort is normal. No respiratory distress.   Abdominal:      General: There is no distension.   Musculoskeletal:      Right lower leg: Edema present.      Left lower leg: Edema present.   Neurological:      Mental Status: He is alert and oriented to person, place, and time.   Psychiatric:         Behavior: Behavior normal.               Procedures:              Results Review:     I reviewed the patient's new clinical results.      Lab Results (last 24 hours)     Procedure Component Value Units Date/Time    Haptoglobin [[id number]]  (Abnormal) Collected: 05/07/21 0558    Specimen: Blood Updated: 05/07/21 1643     Haptoglobin 316 mg/dL     POC Glucose Once [[id number]]  (Abnormal) Collected: 05/07/21 1100    Specimen: Blood Updated: 05/07/21 1102     Glucose 190 mg/dL      Comment: Serial Number: [id number]:  [id number]       Folate [[id number]]  (Normal) Collected: 05/06/21 1811    Specimen: Blood Updated: 05/07/21 1100     Folate 8.16 ng/mL     Narrative:      Results may be falsely increased if patient taking Biotin.      Vitamin B12 " [[id number]]  (Abnormal) Collected: 05/06/21 1811    Specimen: Blood Updated: 05/07/21 1100     Vitamin B-12 1,016 pg/mL     Narrative:      Results may be falsely increased if patient taking Biotin.      Hemoglobin A1c [[id number]]  (Abnormal) Collected: 05/06/21 1811    Specimen: Blood Updated: 05/07/21 1016     Hemoglobin A1C 7.5 %     Narrative:      Hemoglobin A1C Reference Range:    <5.7 %        Normal  5.7-6.4 %     Increased risk for diabetes  > 6.4 %        Diabetes       These guidelines have been recommended by the American Diabetic Association for Hgb A1c.      The following 2010 guidelines have been recommended by the American Diabetes Association for Hemoglobin A1c.    HBA1c 5.7-6.4% Increased risk for future diabetes (pre-diabetes)  HBA1c     >6.4% Diabetes      Lactate Dehydrogenase [[id number]]  (Normal) Collected: 05/07/21 0558    Specimen: Blood Updated: 05/07/21 1003      U/L     Ferritin [[id number]]  (Normal) Collected: 05/07/21 0558    Specimen: Blood Updated: 05/07/21 0839     Ferritin 162.40 ng/mL     Narrative:      Results may be falsely decreased if patient taking Biotin.      Iron [[id number]]  (Abnormal) Collected: 05/07/21 0558    Specimen: Blood Updated: 05/07/21 0833     Iron 30 mcg/dL     Iron Profile [[id number]]  (Abnormal) Collected: 05/07/21 0558    Specimen: Blood Updated: 05/07/21 0833     Iron 30 mcg/dL      Iron Saturation 13 %      Transferrin 158 mg/dL      TIBC 235 mcg/dL     POC Glucose Once [[id number]]  (Abnormal) Collected: 05/07/21 0721    Specimen: Blood Updated: 05/07/21 0721     Glucose 112 mg/dL      Comment: Serial Number: [id number]:  [id number]       Troponin [[id number]]  (Abnormal) Collected: 05/07/21 0558    Specimen: Blood Updated: 05/07/21 0713     Troponin T 0.044 ng/mL     Narrative:      Troponin T Reference Range:  <= 0.03 ng/mL-   Negative for AMI  >0.03 ng/mL-     Abnormal for myocardial necrosis.  Clinicians would have to utilize clinical  acumen, EKG, Troponin and serial changes to determine if it is an Acute Myocardial Infarction or myocardial injury due to an underlying chronic condition.       Results may be falsely decreased if patient taking Biotin.      Basic Metabolic Panel [[id number]]  (Abnormal) Collected: 05/07/21 0558    Specimen: Blood Updated: 05/07/21 0711     Glucose 104 mg/dL      BUN 46 mg/dL      Creatinine 1.72 mg/dL      Sodium 141 mmol/L      Potassium 3.9 mmol/L      Chloride 105 mmol/L      CO2 22.0 mmol/L      Calcium 8.7 mg/dL      eGFR Non African Amer 40 mL/min/1.73      BUN/Creatinine Ratio 26.7     Anion Gap 14.0 mmol/L     Narrative:      GFR Normal >60  Chronic Kidney Disease <60  Kidney Failure <15      Magnesium [[id number]]  (Normal) Collected: 05/07/21 0558    Specimen: Blood Updated: 05/07/21 0711     Magnesium 1.8 mg/dL     CBC Auto Differential [[id number]]  (Abnormal) Collected: 05/07/21 0558    Specimen: Blood Updated: 05/07/21 0645     WBC 6.80 10*3/mm3      RBC 2.53 10*6/mm3      Hemoglobin 7.7 g/dL      Hematocrit 23.2 %      MCV 91.5 fL      MCH 30.4 pg      MCHC 33.3 g/dL      RDW 14.4 %      RDW-SD 46.8 fl      MPV 7.9 fL      Platelets 270 10*3/mm3      Neutrophil % 54.6 %      Lymphocyte % 23.9 %      Monocyte % 13.1 %      Eosinophil % 7.2 %      Basophil % 1.2 %      Neutrophils, Absolute 3.70 10*3/mm3      Lymphocytes, Absolute 1.60 10*3/mm3      Monocytes, Absolute 0.90 10*3/mm3      Eosinophils, Absolute 0.50 10*3/mm3      Basophils, Absolute 0.10 10*3/mm3      nRBC 0.0 /100 WBC     Troponin [[id number]]  (Abnormal) Collected: 05/07/21 0025    Specimen: Blood Updated: 05/07/21 0107     Troponin T 0.032 ng/mL     Narrative:      Troponin T Reference Range:  <= 0.03 ng/mL-   Negative for AMI  >0.03 ng/mL-     Abnormal for myocardial necrosis.  Clinicians would have to utilize clinical acumen, EKG, Troponin and serial changes to determine if it is an Acute Myocardial Infarction or myocardial injury due  to an underlying chronic condition.       Results may be falsely decreased if patient taking Biotin.      BNP [[id number]]  (Abnormal) Collected: 05/07/21 0025    Specimen: Blood Updated: 05/07/21 0105     proBNP 6,011.0 pg/mL     Narrative:      Among patients with dyspnea, NT-proBNP is highly sensitive for the detection of acute congestive heart failure. In addition NT-proBNP of <300 pg/ml effectively rules out acute congestive heart failure with 99% negative predictive value.    Results may be falsely decreased if patient taking Biotin.          Hemoglobin A1C   Date Value Ref Range Status   05/06/2021 7.5 (H) 3.5 - 5.6 % Final     Results from last 7 days   Lab Units 05/06/21  1811   INR  0.99           No results found for: LIPASE  Lab Results   Component Value Date    CHOL 165 11/27/2020    CHLPL 116 09/28/2020    TRIG 198 (H) 11/27/2020    HDL 42 11/27/2020    LDL 89 11/27/2020       No results found for: INTRAOP, PREDX, FINALDX, COMDX    Microbiology Results (last 10 days)     ** No results found for the last 240 hours. **          ECG/EMG Results (most recent)     Procedure Component Value Units Date/Time    ECG 12 Lead [[id number]] Collected: 05/06/21 1749     Updated: 05/06/21 1750     QT Interval 302 ms     Narrative:      HEART RATE= 134  bpm  RR Interval= 448  ms  MS Interval= 126  ms  P Horizontal Axis=   deg  P Front Axis= 79  deg  QRSD Interval= 103  ms  QT Interval= 302  ms  QRS Axis= 62  deg  T Wave Axis= 223  deg  - ABNORMAL ECG -  Sinus tachycardia  Repol abnrm suggests ischemia, diffuse leads  Electronically Signed By:   Date and Time of Study: 2021-05-06 17:49:11    ECG 12 Lead [[id number]] Collected: 05/06/21 2011     Updated: 05/06/21 2014     QT Interval 305 ms     Narrative:      HEART RATE= 134  bpm  RR Interval= 448  ms  MS Interval= 91  ms  P Horizontal Axis= 160  deg  P Front Axis= 257  deg  QRSD Interval= 97  ms  QT Interval= 305  ms  QRS Axis= 63  deg  T Wave Axis= 225  deg  - ABNORMAL  ECG -  Sinus or ectopic atrial tachycardia  Atrial premature complex  Repol abnrm suggests ischemia, diffuse leads  When compared with ECG of 06-May-2021 17:49:11,  No significant change  Electronically Signed By:   Date and Time of Study: 2021-05-06 20:11:31    Adult Transthoracic Echo Complete w/ Color, Spectral and Contrast if necessary per protocol [[id number]] Collected: 05/07/21 0609     Updated: 05/07/21 0748     BSA 2.3 m^2      RVIDd 2.4 cm      IVSd 1.3 cm      LVIDd 6.1 cm      LVIDs 5.5 cm      LVPWd 1.1 cm      IVS/LVPW 1.2     FS 10.1 %      EDV(Teich) 185.6 ml      ESV(Teich) 145.4 ml      EF(Teich) 21.6 %      EDV(cubed) 224.9 ml      ESV(cubed) 163.5 ml      EF(cubed) 27.3 %      LV mass(C)d 307.0 grams      LV mass(C)dI 131.3 grams/m^2      SV(Teich) 40.2 ml      SI(Teich) 17.2 ml/m^2      SV(cubed) 61.4 ml      SI(cubed) 26.3 ml/m^2      Ao root diam 3.6 cm      Ao root area 10.3 cm^2      ACS 1.9 cm      asc Aorta Diam 3.4 cm      LVOT diam 1.9 cm      LVOT area 2.8 cm^2      RVOT diam 2.3 cm      RVOT area 4.1 cm^2      EDV(MOD-sp4) 128.4 ml      ESV(MOD-sp4) 72.2 ml      EF(MOD-sp4) 43.8 %      SV(MOD-sp4) 56.2 ml      SI(MOD-sp4) 24.0 ml/m^2      Ao root area (BSA corrected) 1.5     LV Michelle Vol (BSA corrected) 54.9 ml/m^2      LV Sys Vol (BSA corrected) 30.9 ml/m^2      Aortic R-R 0.91 sec      Aortic HR 66.2 BPM      MV V2 max 150.2 cm/sec      MV max PG 9.0 mmHg      MV V2 mean 79.6 cm/sec      MV mean PG 3.1 mmHg      MV V2 VTI 33.6 cm      MVA(VTI) 1.7 cm^2      Ao pk jalen 88.9 cm/sec      Ao max PG 3.2 mmHg      Ao max PG (full) -2.1 mmHg      Ao V2 mean 57.4 cm/sec      Ao mean PG 1.5 mmHg      Ao mean PG (full) -0.67 mmHg      Ao V2 VTI 16.8 cm      ISACC(I,A) 3.4 cm^2      ISACC(I,D) 3.4 cm^2      ISACC(V,A) 3.6 cm^2      ISACC(V,D) 3.6 cm^2      LV V1 max PG 5.2 mmHg      LV V1 mean PG 2.2 mmHg      LV V1 max 114.4 cm/sec      LV V1 mean 63.5 cm/sec      LV V1 VTI 20.3 cm      CO(Ao) 11.4 l/min       CI(Ao) 4.9 l/min/m^2      SV(Ao) 172.7 ml      SI(Ao) 73.9 ml/m^2      CO(LVOT) 3.8 l/min      CI(LVOT) 1.6 l/min/m^2      SV(LVOT) 57.0 ml      SV(RVOT) 104.4 ml      SI(LVOT) 24.4 ml/m^2      PA V2 max 117.0 cm/sec      PA max PG 5.5 mmHg      PA max PG (full) 0.83 mmHg      PA V2 mean 77.0 cm/sec      PA mean PG 2.8 mmHg      PA mean PG (full) 0.48 mmHg      PA V2 VTI 24.9 cm      PVA(I,A) 4.2 cm^2       CV ECHO GEORGETTE - PVA(I,D) 4.2 cm^2       CV ECHO GEORGETTE - PVA(V,A) 3.8 cm^2       CV ECHO GEORGETTE - PVA(V,D) 3.8 cm^2      PA acc time 0.09 sec      RV V1 max PG 4.6 mmHg      RV V1 mean PG 2.3 mmHg      RV V1 max 107.8 cm/sec      RV V1 mean 71.2 cm/sec      RV V1 VTI 25.5 cm      TR max jarvis 162.2 cm/sec      RVSP(TR) 13.5 mmHg      RAP systole 3.0 mmHg      PA pr(Accel) 39.6 mmHg      Pulm Sys Jarvis 22.3 cm/sec      Pulm Michelle Jarvis 71.5 cm/sec      Pulm S/D 0.31     Qp/Qs 1.8      CV ECHO GEORGETTE - BZI_BMI 37.4 kilograms/m^2       CV ECHO GEORGETTE - BSA(HAYCOCK) 2.5 m^2       CV ECHO GEORGETTE - BZI_METRIC_WEIGHT 118.4 kg       CV ECHO GEORGETTE - BZI_METRIC_HEIGHT 177.8 cm      LA dimension(2D) 4.1 cm      EF - Contrast (4Ch) 44.0 cm2     ECG 12 Lead [[id number]] Collected: 05/07/21 0448     Updated: 05/07/21 1651     QT Interval 390 ms     Narrative:      HEART RATE= 78  bpm  RR Interval= 766  ms  MT Interval=   ms  P Horizontal Axis=   deg  P Front Axis=   deg  QRSD Interval= 106  ms  QT Interval= 390  ms  QRS Axis= 55  deg  T Wave Axis= 194  deg  - ABNORMAL ECG -  Atrial fibrillation  Ventricular premature complex  Repol abnrm suggests ischemia, diffuse leads  When compared with ECG of 06-May-2021 20:11:31,  Significant rate decrease  Electronically Signed By: Estefany Hyde ([facility]) 07-May-2021 16:47:49  Date and Time of Study: 2021-05-07 04:48:57              Results for orders placed during the hospital encounter of 11/27/20    Adult Transesophageal Echo (MARSHA) W/ Cont if Necessary Per Protocol (Cardiology  Department)    Interpretation Summary  Date of study  11/30/2020    Procedure performed  Transesophageal echocardiogram.  Doppler color pulsed wave and continuous wave Doppler study    Indications  Recent stroke.  Assess for cardioembolic episodes.    Anesthesia  Provided by anesthesiologist with intravenous diprivan.    Procedure  Patient was brought to outpatient cardiovascular area in a fasting state and patient was given intravenous Diprivan by anesthesiologist.  MARSHA probe was passed without difficulty.    Patient tolerated the procedure well.  No complications were noted.    Results  Technically satisfactory study.  Mitral valve is thickened with adequate opening motion.  Mild mitral regurgitation is present.  Tricuspid valve is normal.  Aortic valve is tricuspid with adequate opening motion.  Minimal aortic regurgitation is present.  Biatrial enlargement is present.  Left ventricle is enlarged with diffuse hypocontractility with ejection fraction of 35 to 40%.  Smoking effect is present in the left atrium and left ventricle.  Left atrial appendage is free of clots except for presence of smoking effect.  No pericardial effusion or intracardiac thrombus is seen.  Atrial septum is intact.  No PFO is present.  Aorta is normal.    Impression  Biatrial enlargement.  Smoking effect in the left atrium and left ventricle and left atrial appendage.  Mild mitral and aortic regurgitation.  Left ventricle enlargement with diffuse hypocontractility with ejection fraction of 35 to 40%.  ]]]]]]]]]]]]]]]]      XR Chest 1 View    Result Date: 5/6/2021  No active disease. Stable chest  Electronically Signed By-Davis Dee MD On:5/6/2021 6:51 PM This report was finalized on [id number] by  Davis Dee MD.          Xrays, labs reviewed personally by physician.    Medication Review:   I have reviewed the patient's current medication list      Scheduled Meds  aspirin, 81 mg, Oral, Daily  atenolol, 100 mg, Oral,  Daily  atorvastatin, 40 mg, Oral, Nightly  bumetanide, 1 mg, Intravenous, Q12H  digoxin, 125 mcg, Oral, Daily  donepezil, 10 mg, Oral, Nightly  DULoxetine, 60 mg, Oral, Daily  enoxaparin, 1 mg/kg, Subcutaneous, BID  hydrALAZINE, 50 mg, Oral, Q12H  insulin lispro, 0-9 Units, Subcutaneous, TID AC  insulin NPH-insulin regular, 30 Units, Subcutaneous, BID With Meals  isosorbide mononitrate, 60 mg, Oral, BID  sodium chloride, 10 mL, Intravenous, Q12H  ticagrelor, 90 mg, Oral, BID  vitamin B-12, 1,000 mcg, Oral, Daily        Meds Infusions  dilTIAZem, 5-15 mg/hr, Last Rate: Stopped (05/07/21 0909)        Meds PRN  •  acetaminophen **OR** acetaminophen **OR** acetaminophen  •  albuterol  •  aluminum-magnesium hydroxide-simethicone  •  dextrose  •  dextrose  •  glucagon (human recombinant)  •  HYDROcodone-acetaminophen  •  insulin lispro **AND** insulin lispro  •  magnesium sulfate **OR** magnesium sulfate in D5W 1g/100mL (PREMIX)  •  melatonin  •  nitroglycerin  •  ondansetron **OR** ondansetron  •  potassium chloride  •  [COMPLETED] Insert peripheral IV **AND** sodium chloride        Assessment/Plan   Assessment/Plan     Active Hospital Problems    Diagnosis  POA   • **Paroxysmal atrial fibrillation with rapid ventricular response (CMS/HCC) [I48.0]  Yes   • Obesity [E66.9]  Yes   • Right hemiplegia (CMS/HCC) [G81.91]  Yes   • History of cerebrovascular accident [Z86.73]  Not Applicable   • Depressive disorder [F32.9]  Yes   • Immobility syndrome [M62.3]  Yes   • Hemiplegia, unspecified affecting right dominant side (CMS/HCC) [G81.91]  Yes   • Unspecified dementia with behavioral disturbance (CMS/HCC) [F03.91]  Yes   • Chronic obstructive pulmonary disease, unspecified (CMS/HCC) [J44.9]  Yes   • Essential hypertension [I10]  Yes   • Dyslipidemia [E78.5]  Yes   • Hx of CABG [Z95.1]  Not Applicable   • Diabetes mellitus due to underlying condition without complication, without long-term current use of insulin (CMS/HCC)  [E08.9]  Yes   • Coronary artery disease [I25.10]  Yes   • ANNETTE treated with BiPAP [G47.33]  Yes   • Acute congestive heart failure (CMS/HCC) [I50.9]  Yes   • Diabetic neuropathy (CMS/HCC) [E11.40]  Yes      Resolved Hospital Problems   No resolved problems to display.       MEDICAL DECISION MAKING COMPLEXITY BY PROBLEM:     A. fib RVR  -Rate improving today  -Wean Cardizem drip  -Review echo  -Continue digoxin home medication  -Cardiology following    Acute on chronic HFrEF due to A. Fib/hypertension  -Diurese IV Bumex  -Monitor volume status    HTN, CAD remote CABG  -Continue Brilinta, aspirin, Imdur    Type II DM/neuropathy  -Home Lantus 30 units, SSI  -Continue Cymbalta  -Monitor and adjust as needed    Chronic stroke w/right hemiaplasia  -On aspirin and Brilinta    Dementia, depression  -Continue Aricept, Cymbalta    ANNETTE  -can use home unit if able to bring in     VTE Prophylaxis -   Mechanical Order History:     None      Pharmalogical Order History:      Ordered     Dose Route Frequency Stop    21  enoxaparin (LOVENOX) syringe 120 mg      1 mg/kg SC 2 Times Daily --                  Code Status -   Code Status and Medical Interventions:   Ordered at: 21     Level Of Support Discussed With:    Patient     Code Status:    CPR     Medical Interventions (Level of Support Prior to Arrest):    Full       This patient has been examined wearing appropriate Personal Protective Equipment . 21        Discharge Planning  Hopefully soon if heart rate control        Electronically signed by Jose Guadalupe Corea DO, 21, 16:54 EDT.  [facility] Hospitalist Team          Electronically signed by Jose Guadalupe Coera DO at 21 1706          Consult Notes (last 72 hours) (Notes from 21 0639 through 05/10/21 0639)      Estefany Hyde MD at 21 1230              Cardiology Consult Note    Patient Identification:  Name: Benson Mclean  Age: 70 y.o.  Sex: male  :  1950  MRN:  [id number]             Requesting Physician :  Hospitalist Dr. Corea    Reason for Consultation / Chief Complaint : patient co-management  ? New onset afib, history of CVA history of CAD/CABG     History of Present Illness:      This is a 70 year old male with past medical history of     - CAD/CABG 2005, MI 2000 and 2002  status post stent to LAD 2009    Status post stent to LAD 11/13/2015  Status post stent to mid LAD and SVG to marginal branch 09/16/2016.   -history of intermittent atrial fibrillation  - Left MCA territory stroke with persistent right sided weakness and loss of balance, memory issues   - s/p MARSHA that showed biatrial enlargement, smoking in left atrium and left ventricle and LO, LVE with EF 35-40%  - Hypertension, hyperlipidemia   - Diabetes   - ANNETTE  - renal dysfunction previous creatine 3.-- down to 1.7 today   -status post colon surgery (partial colectomy) appendectomy cholecystectomy right 4th toe removal and carpal tunnel surgery  -allergy to codeine  - history of tobacco use       Who presented to the ED after primary care found patient to have irregular heart rate and rhythm; they called to try to get in to see primary cardiologist DR. Rm however since he is on Vacation, advised the ED.  In the ED patient was found to have afib RVR and was started on Cardizem drip and loaded with IV digoxin as well.  Labs in the ED showed troponin 0.035, pro bnp 5625, , BUN 45, cr 1.79 A1C 7.5 hgb 8.5.  Patient reports intermittent chest pain that comes and goes within 20 secs or less.  He does report worsening lower extremity edema, dyspnea on any type of exertion (just getting out of bed), orthopnea.  On examination cardizem drip has been discontinued rate remains 90-100s   Will change to IV bumex- monitor renal function and electrolytes     Chart reviewed:  Cardiac catheterization 09/16/2016 revealed  Left ventricular inferior wall hypokinesis with ejection fraction of 40%.  Left main coronary  "artery is normal  Proximal LAD stent is patent.  95-99% mid LAD InStent restenoses  RCA and circumflex coronary arteries are chronically occluded.  Sherman is not a graft.  Lima is normal  Left subclavian artery is normal  SVG to marginal branch has proximal 90% disease.  It is a jump graft to 1st and 2nd marginal branches.  First marginal branch has been chronically occluded.  SVG to RCA is patent        Transesophageal echocardiogram 11/30/2020.  Biatrial enlargement.  Smoking effect in the left atrium and left ventricle and left atrial appendage.  Mild mitral and aortic regurgitation.  Left ventricle enlargement with diffuse hypocontractility with ejection fraction of 35 to 40%.       Further assessment and plan per Dr. Akhtar   Patient reported \"brain bleed\" but I do not see that documented.  Patient was not started on anticoagulation in Nov after CVA.  Currently on Aspirin and brilinta.           Cardiology attending note :    Patient seen and examined.  Chart reviewed.  Discussed with my nurse practitioner.  Reviewed her documentation.  This is 70-year-old with previous history of CAD, CABG, PCI, recurrent stroke with history of hemorrhagic conversion in the past.  Severe LV dysfunction was in primary care office was found to have irregular heart rhythm therefore sent here.  Patient is in A. fib.      Assessment:      Paroxysmal A. fib with rapid ventricular response  History of recurrent CVA with right hemiplegia, memory issues, balance issue  CAD history of CABG 2005 and PCI to LAD 2009, 2015, NIELS to LAD and SVG to OM 2016  History of MI in 2000 and 2002  Acute on chronic  HFrEF due to ischemic cardiomyopathy LVEF of 35 to 40% by MARSHA 11/30/2020, EF of 40% by echo 11/27/2020  Hypertension,   hyperlipidemia,   diabetes,   ANNETTE, COPD  CKD  S/P partial colectomy, appendectomy, cholecystectomy, right fourth toe removal, carpal tunnel  Allergies/intolerance to codeine  Former smoker  Family history of heart disease " in father mother and sister      Recommendations / Plan:        Counseled on smoking cessation  Telemetry  Rate control patient was given IV digoxin and IV Cardizem drip was started.  We will continue rate control  Troponin was mildly elevated probably due to demand ischemia.  Will monitor trend.  Echo to assess LV function.  Monitor blood pressure and treat accordingly.  Continue statins for dyslipidemia.  Will defer treatment of diabetes COPD to primary team.  Diuresis as tolerated IV Bumex  Monitor renal function and urine output and electrolytes  Patient's last drug-eluting stent was over a year ago will discontinue Brilinta.  Continue baby aspirin and Eliquis as tolerated to prevent thromboembolic from A. fib and recurrent CVA  Patient says he had brain bleed in the past.  Will consult neurology to clear patient for Eliquis.  My review of records MRI from 11/27/2020 revealed  1. Acute left PCA distribution infarct involving the left occipital lobe  and dorsal aspect of the left thalamus.  2. Encephalomalacia involving the left parietal-occipital region related  to remote infarct.  3. Small area of remote lacunar infarction involving the right  cerebellum.  4. Generalized cerebral atrophy.    Patient is scheduled for CT head per neurology recommendation  If there is no intracranial pathology okay to start anticoagulation therapy if cleared by neurology  Risk benefits and alternatives were long-term oral anticoagulation reviewed and discussed with patient  Further recommendations based on patient work-up  Assessment       Diagnosis Plan   1. Chronic obstructive pulmonary disease, unspecified COPD type (CMS/HCC)  Ambulatory Referral to Home Health   2. Atrial fibrillation with rapid ventricular response (CMS/HCC)     3. Anemia, unspecified type               Past Medical History:  Past Medical History:   Diagnosis Date   • Appetite loss    • Brain bleed (CMS/HCC)    • Carpal tunnel syndrome on left     carpal  tunnel on left hand   • Diabetic neuropathy (CMS/HCC)    • DM2 (diabetes mellitus, type 2) (CMS/HCC)    • History of angina    • Hyperlipidemia    • Hypogonadism in male    • Obesity    • Sleep apnea    • Stroke (CMS/HCC)    • Unsteady gait      Past Surgical History:  Past Surgical History:   Procedure Laterality Date   • ANGIOPLASTY      X2   • APPENDECTOMY     • CARDIAC CATHETERIZATION  11/13/2015   • CARPAL TUNNEL RELEASE Left 04/29/2018    carpal tunnel- lt hand// other hand surgeries    • CATARACT EXTRACTION, BILATERAL  2002    Dr. Lux Acosta   • CHOLECYSTECTOMY     • COLON RESECTION  2005   • CORONARY ANGIOPLASTY     • CORONARY ANGIOPLASTY WITH STENT PLACEMENT  11/13/2015    PTCA stent to proximal in stent and mid to distal lad   • CORONARY ANGIOPLASTY WITH STENT PLACEMENT  09/16/2016    PTCA stent to mid lad and stent to vein graft to marginal   • CORONARY ARTERY BYPASS GRAFT  2005    @ [facility]   • CYST REMOVAL      cyst removed from scrotum   • FOOT SURGERY Right 07/17/2018   • FOOT SURGERY Left 02/04/2019   • TOE AMPUTATION Right     right toe removed D/T infected cut that went to the bone      Allergies:  Allergies   Allergen Reactions   • Codeine Itching     Home Meds:  Medications Prior to Admission   Medication Sig Dispense Refill Last Dose   • aspirin 81 MG chewable tablet Chew 81 mg Daily.   5/6/2021 at Unknown time   • atenolol (TENORMIN) 100 MG tablet Take 100 mg by mouth Daily.      • atorvastatin (LIPITOR) 40 MG tablet Take 40 mg by mouth Every Night.   5/5/2021 at Unknown time   • bumetanide (BUMEX) 1 MG tablet Take 1 mg by mouth Every Morning.   5/6/2021 at Unknown time   • digoxin (LANOXIN) 125 MCG tablet Take 125 mcg by mouth Daily. In the afternoon   5/5/2021 at Unknown time   • donepezil (ARICEPT) 10 MG tablet Take 10 mg by mouth Every Night.   5/5/2021 at Unknown time   • DULoxetine (CYMBALTA) 60 MG capsule Take 60 mg by mouth Daily.   5/6/2021 at Unknown time   • gabapentin (NEURONTIN) 100 MG  capsule Take 200 mg by mouth 2 (two) times a day.   5/6/2021 at Unknown time   • Glucagon, rDNA, (Glucagon Emergency) 1 MG kit Inject 1 mg as directed 1 (One) Time As Needed (hypoglycemia).      • hydrALAZINE (APRESOLINE) 50 MG tablet Take 50 mg by mouth 2 (two) times a day. Hold for SBP <110   5/6/2021 at Unknown time   • HYDROcodone-acetaminophen (NORCO) 7.5-325 MG per tablet Take 1 tablet by mouth Every 12 (Twelve) Hours As Needed for Mild Pain  or Moderate Pain .      • insulin NPH-insulin regular (humuLIN 70/30,novoLIN 70/30) (70-30) 100 UNIT/ML injection Inject 30 Units under the skin into the appropriate area as directed 2 (Two) Times a Day With Meals.   5/6/2021 at Unknown time   • isosorbide mononitrate (IMDUR) 60 MG 24 hr tablet Take 60 mg by mouth 2 (Two) Times a Day.   5/6/2021 at Unknown time   • metFORMIN ER (GLUCOPHAGE-XR) 500 MG 24 hr tablet Take 500 mg by mouth 2 (two) times a day.   5/6/2021 at Unknown time   • Omega-3 Fatty Acids (fish oil) 1000 MG capsule capsule Take 1,000 mg by mouth Daily.   5/6/2021 at Unknown time   • Skin Protectants, Misc. (eucerin) cream Apply 1 application topically to the appropriate area as directed 2 (Two) Times a Day As Needed for Dry Skin. To upper & lower extremities      • ticagrelor (BRILINTA) 90 MG tablet tablet Take 90 mg by mouth 2 (Two) Times a Day.   5/6/2021 at Unknown time   • tiotropium (Spiriva HandiHaler) 18 MCG per inhalation capsule Place 1 capsule into inhaler and inhale Daily. 30 capsule 1 5/6/2021 at Unknown time   • vitamin B-12 (CYANOCOBALAMIN) 1000 MCG tablet Take 1,000 mcg by mouth Daily.   5/6/2021 at Unknown time     Current Meds:     Current Facility-Administered Medications:   •  acetaminophen (TYLENOL) tablet 650 mg, 650 mg, Oral, Q4H PRN **OR** acetaminophen (TYLENOL) 160 MG/5ML solution 650 mg, 650 mg, Oral, Q4H PRN **OR** acetaminophen (TYLENOL) suppository 650 mg, 650 mg, Rectal, Q4H PRN, Rayne Bah APRN  •  albuterol (PROVENTIL)  nebulizer solution 0.083% 2.5 mg/3mL, 2.5 mg, Nebulization, Q6H PRN, Rayne Bah APRN  •  aluminum-magnesium hydroxide-simethicone (MAALOX MAX) 400-400-40 MG/5ML suspension 15 mL, 15 mL, Oral, Q6H PRN, Rayne Bah, APRN  •  aspirin chewable tablet 81 mg, 81 mg, Oral, Daily, Rayne Bah APRN, 81 mg at 05/08/21 0844  •  atenolol (TENORMIN) tablet 100 mg, 100 mg, Oral, Daily, Rayne Bah, APRN, 100 mg at 05/08/21 0844  •  atorvastatin (LIPITOR) tablet 40 mg, 40 mg, Oral, Nightly, Rayne Bah, APRN, 40 mg at 05/07/21 2050  •  [START ON 5/9/2021] bumetanide (BUMEX) tablet 1 mg, 1 mg, Oral, Daily, Jose Guadalupe Corea,   •  dextrose (D50W) 25 g/ 50mL Intravenous Solution 25 g, 25 g, Intravenous, Q15 Min PRN, Rayne Bah APRN, 25 g at 05/08/21 0215  •  dextrose (GLUTOSE) oral gel 15 g, 15 g, Oral, Q15 Min PRN, Rayne Bah, APRN  •  digoxin (LANOXIN) tablet 125 mcg, 125 mcg, Oral, Daily, Rayne Bah APRN, 125 mcg at 05/07/21 1258  •  donepezil (ARICEPT) tablet 10 mg, 10 mg, Oral, Nightly, Rayne Bah, APRN, 10 mg at 05/07/21 2051  •  DULoxetine (CYMBALTA) DR capsule 60 mg, 60 mg, Oral, Daily, Rayne Bah APRN, 60 mg at 05/08/21 0845  •  glucagon (human recombinant) (GLUCAGEN DIAGNOSTIC) injection 1 mg, 1 mg, Subcutaneous, PRN, Rayne Bah, APRN  •  hydrALAZINE (APRESOLINE) tablet 10 mg, 10 mg, Oral, Q12H, Jos eGuadalupe Corea DO, 10 mg at 05/08/21 0845  •  HYDROcodone-acetaminophen (NORCO) 7.5-325 MG per tablet 1 tablet, 1 tablet, Oral, Q12H PRN, Rayne Bah APRN, 1 tablet at 05/07/21 2116  •  insulin lispro (ADMELOG) injection 0-9 Units, 0-9 Units, Subcutaneous, TID AC, 2 Units at 05/07/21 1259 **AND** insulin lispro (ADMELOG) injection 0-9 Units, 0-9 Units, Subcutaneous, PRN, Rayne Bah, APRN  •  insulin NPH-insulin regular (humuLIN 70/30,novoLIN 70/30) injection 30 Units, 30 Units, Subcutaneous, BID With Meals, Rayne Bah APRN, 30 Units at 05/08/21 0844  •  isosorbide mononitrate  (IMDUR) 24 hr tablet 60 mg, 60 mg, Oral, BID, Rayne Bah, APRN, 60 mg at 05/08/21 0845  •  Magnesium Sulfate 2 gram infusion - Mg less than or equal to 1.5 mg/dL, 2 g, Intravenous, PRN **OR** Magnesium Sulfate 1 gram infusion - Mg 1.6-1.9 mg/dL, 1 g, Intravenous, PRN, Rayne Bah, APRN  •  melatonin tablet 5 mg, 5 mg, Oral, Nightly PRN, Rayne Bah, APRN  •  nitroglycerin (NITROSTAT) SL tablet 0.4 mg, 0.4 mg, Sublingual, Q5 Min PRN, Rayne Bah APRN  •  ondansetron (ZOFRAN) tablet 4 mg, 4 mg, Oral, Q6H PRN **OR** ondansetron (ZOFRAN) injection 4 mg, 4 mg, Intravenous, Q6H PRN, Rayne Bah APRLIVIA  •  potassium chloride (K-DUR,KLOR-CON) CR tablet 40 mEq, 40 mEq, Oral, PRN, Rayne Bah, APRN  •  [COMPLETED] Insert peripheral IV, , , Once **AND** sodium chloride 0.9 % flush 10 mL, 10 mL, Intravenous, PRN, Jacob Amador MD  •  sodium chloride 0.9 % flush 10 mL, 10 mL, Intravenous, Q12H, Rayne Bah APRN, 10 mL at 05/08/21 0844  •  vitamin B-12 (CYANOCOBALAMIN) tablet 1,000 mcg, 1,000 mcg, Oral, Daily, Rayne Bah APRN, 1,000 mcg at 05/08/21 0845  Social History:   Social History     Tobacco Use   • Smoking status: Former Smoker     Packs/day: 1.00     Types: Cigarettes   • Smokeless tobacco: Never Used   Substance Use Topics   • Alcohol use: No      Family History:  Family History   Problem Relation Age of Onset   • Heart disease Mother    • Heart disease Father    • Diabetes Sister    • Heart disease Sister    • Diabetes Brother    • Mental illness Brother         Review of Systems : Review of Systems   Constitutional: Positive for malaise/fatigue. Negative for decreased appetite, diaphoresis and fever.   Cardiovascular: Positive for chest pain, dyspnea on exertion, leg swelling and orthopnea. Negative for irregular heartbeat, palpitations and syncope.   Respiratory: Positive for shortness of breath. Negative for cough.    Musculoskeletal: Positive for back pain.   Gastrointestinal:  "Negative for abdominal pain, melena, nausea and vomiting.   Neurological: Positive for focal weakness, loss of balance and weakness. Negative for dizziness.        CVA in Nov with left side weakness and mobility/balance issues    Psychiatric/Behavioral: Positive for memory loss.   All other systems reviewed and are negative.         Constitutional:  Temp:  [96 °F (35.6 °C)-98.3 °F (36.8 °C)] 97.5 °F (36.4 °C)  Heart Rate:  [61-87] 87  Resp:  [18-20] 18  BP: ()/(41-58) 120/56    Physical Exam   /56   Pulse 87   Temp 97.5 °F (36.4 °C) (Oral)   Resp 18   Ht 177.8 cm (70\")   Wt 116 kg (256 lb 13.4 oz)   SpO2 91%   BMI 36.85 kg/m²   Vitals and nursing note reviewed.   Constitutional:       Appearance: Not in distress. Frail.   Neck:      Vascular: No JVR. JVD normal.   Pulmonary:      Effort: Increased respiratory effort.      Breath sounds: Decreased air movement present. Decreased breath sounds present. No wheezing. No rhonchi. No rales.   Chest:      Chest wall: Not tender to palpatation.   Cardiovascular:      PMI at left midclavicular line. Normal rate. Irregularly irregular rhythm. Normal S1. Normal S2.      Murmurs: There is no murmur.      No gallop. No click. No rub.   Pulses:     Intact distal pulses.   Edema:     Peripheral edema present.     Thigh: bilateral trace edema of the thigh.     Pretibial: bilateral 1+ edema of the pretibial area.     Ankle: bilateral 2+ edema of the ankle.     Feet: bilateral 2+ edema of the feet.  Abdominal:      General: Bowel sounds are normal.      Palpations: Abdomen is soft.      Tenderness: There is no abdominal tenderness.   Musculoskeletal:         General: No tenderness. Skin:     General: Skin is warm and dry.   Neurological:      General: No focal deficit present.      Mental Status: Alert and oriented to person, place and time.           Cardiographics  ECG: EKG tracing was  personally reviewed by me  ECG 12 Lead   Final Result   HEART RATE= 78  bpm "   RR Interval= 766  ms   WV Interval=   ms   P Horizontal Axis=   deg   P Front Axis=   deg   QRSD Interval= 106  ms   QT Interval= 390  ms   QRS Axis= 55  deg   T Wave Axis= 194  deg   - ABNORMAL ECG -   Atrial fibrillation   Ventricular premature complex   Repol abnrm suggests ischemia, diffuse leads   When compared with ECG of 06-May-2021 20:11:31,   Significant rate decrease   Electronically Signed By: Estefany Hyde ([facility]) 07-May-2021 16:47:49   Date and Time of Study: 2021-05-07 04:48:57      ECG 12 Lead   Preliminary Result   HEART RATE= 134  bpm   RR Interval= 448  ms   WV Interval= 91  ms   P Horizontal Axis= 160  deg   P Front Axis= 257  deg   QRSD Interval= 97  ms   QT Interval= 305  ms   QRS Axis= 63  deg   T Wave Axis= 225  deg   - ABNORMAL ECG -   Sinus or ectopic atrial tachycardia   Atrial premature complex   Repol abnrm suggests ischemia, diffuse leads   When compared with ECG of 06-May-2021 17:49:11,   No significant change   Electronically Signed By:    Date and Time of Study: 2021-05-06 20:11:31      ECG 12 Lead   Preliminary Result   HEART RATE= 134  bpm   RR Interval= 448  ms   WV Interval= 126  ms   P Horizontal Axis=   deg   P Front Axis= 79  deg   QRSD Interval= 103  ms   QT Interval= 302  ms   QRS Axis= 62  deg   T Wave Axis= 223  deg   - ABNORMAL ECG -   Sinus tachycardia   Repol abnrm suggests ischemia, diffuse leads   Electronically Signed By:    Date and Time of Study: 2021-05-06 17:49:11      ECG 12 Lead    (Results Pending)       Telemetry: afib rate 90-100s    Echocardiogram:   Results for orders placed during the hospital encounter of 05/06/21    Adult Transthoracic Echo Complete w/ Color, Spectral and Contrast if necessary per protocol    Interpretation Summary  Technically difficult study due to poor acoustic windows, not all left ventricular walls are well visualized therefore Lumason contrast was given to assess LV function..  LVE with basal inferior wall hypokinesis and  septal dyskinesis, estimated LV ejection fraction of 35%  Normal RV size  Biatrial enlargement seen.  Aortic valve, mitral valve, tricuspid valve appears structurally normal, no significant regurgitation seen.  No pericardial effusion seen.  Proximal aorta appears normal in size.      Imaging  Chest X-ray:   Imaging Results (Last 24 Hours)     ** No results found for the last 24 hours. **          Lab Review: I have reviewed the labs  Results from last 7 days   Lab Units 05/07/21  0558 05/07/21  0025 05/06/21  1811   TROPONIN T ng/mL 0.044* 0.032* 0.035*     Results from last 7 days   Lab Units 05/08/21  0258   MAGNESIUM mg/dL 1.9     Results from last 7 days   Lab Units 05/08/21  0258   SODIUM mmol/L 141   POTASSIUM mmol/L 4.4   BUN mg/dL 48*   CREATININE mg/dL 2.02*   CALCIUM mg/dL 8.6     @[id number]@  Results from last 7 days   Lab Units 05/07/21  0558 05/06/21  1811   WBC 10*3/mm3 6.80 8.30   HEMOGLOBIN g/dL 7.7* 8.5*   HEMATOCRIT % 23.2* 25.8*   PLATELETS 10*3/mm3 270 286     Results from last 7 days   Lab Units 05/06/21  1811   INR  0.99   APTT seconds 23.9*             Estefany Hyde MD  5/8/2021, 12:30 EDT      EMR Dragon/Transcription:   Dictated utilizing Dragon dictation    Electronically signed by Estefany Hyde MD at 05/08/21 1238     Zayra Moran MD at 05/08/21 0908      Consult Orders    1. Inpatient Neurology Consult Other (see comments) [[id number]] ordered by Richar Akhtar MD at 05/07/21 1948                   Primary Care Provider: Samantha Zabala APRN     Consult requested by: Richar Akhtar MD    Reason for Consultation: Neurological evaluation for starting anticoagulation.     Benson Mclean is a 70 y.o. male *    History taken from: patient chart    Chief complaint: Evaluation for start of anticoagulation.      Subjective   SUBJECTIVE:    History of present illness:   The patient is a 70 year old gentleman with multiple medical problems including CAD,  s/p  CABG, MI, stent placement in 2009, 2015, intermittent atrial fibrillation, HTN, HLD, DM ANNETTE who had stroke in late November 2020. It involved the left PCA distribution, Left Thalamus and occipital region.  The patient was not a candidate for tPA.  He had recovered well with some residual right sided hemiparesis.   He is being admitted for atrial fibrillation and shortness of air.  At present he seems to be at his baseline.   He denies double vision, speech and swallowing problems and bowel and bladder problems.     Review of Systems   Constitutional: malaise,  No fever.   HENT: Negative.    Eyes: Negative.    Respiratory: SOA  Cardiovascular: chest pain,  Leg  Swelling,orthpnea.    Gastrointestinal: Negative.    Genitourinary: Negative.    Musculoskeletal: low back pain  Skin: Negative.    Neurological: stroke.    Hematological: Negative.    Psychiatric/Behavioral: memory problems.        PATIENT HISTORY:  Past Medical History:   Diagnosis Date   • Appetite loss    • Brain bleed (CMS/HCC)    • Carpal tunnel syndrome on left     carpal tunnel on left hand   • Diabetic neuropathy (CMS/HCC)    • DM2 (diabetes mellitus, type 2) (CMS/HCC)    • History of angina    • Hyperlipidemia    • Hypogonadism in male    • Obesity    • Sleep apnea    • Stroke (CMS/HCC)    • Unsteady gait    ,   Past Surgical History:   Procedure Laterality Date   • ANGIOPLASTY      X2   • APPENDECTOMY     • CARDIAC CATHETERIZATION  11/13/2015   • CARPAL TUNNEL RELEASE Left 04/29/2018    carpal tunnel- lt hand// other hand surgeries    • CATARACT EXTRACTION, BILATERAL  2002    Dr. Lux Acosta   • CHOLECYSTECTOMY     • COLON RESECTION  2005   • CORONARY ANGIOPLASTY     • CORONARY ANGIOPLASTY WITH STENT PLACEMENT  11/13/2015    PTCA stent to proximal in stent and mid to distal lad   • CORONARY ANGIOPLASTY WITH STENT PLACEMENT  09/16/2016    PTCA stent to mid lad and stent to vein graft to marginal   • CORONARY ARTERY BYPASS GRAFT  2005    @ [facility]   •  CYST REMOVAL      cyst removed from scrotum   • FOOT SURGERY Right 07/17/2018   • FOOT SURGERY Left 02/04/2019   • TOE AMPUTATION Right     right toe removed D/T infected cut that went to the bone   ,   Family History   Problem Relation Age of Onset   • Heart disease Mother    • Heart disease Father    • Diabetes Sister    • Heart disease Sister    • Diabetes Brother    • Mental illness Brother    ,   Social History     Tobacco Use   • Smoking status: Former Smoker     Packs/day: 1.00     Types: Cigarettes   • Smokeless tobacco: Never Used   Vaping Use   • Vaping Use: Never used   Substance Use Topics   • Alcohol use: No   • Drug use: Yes     Frequency: 1.0 times per week     Types: Marijuana     Comment: socially   ,   Medications Prior to Admission   Medication Sig Dispense Refill Last Dose   • aspirin 81 MG chewable tablet Chew 81 mg Daily.   5/6/2021 at Unknown time   • atenolol (TENORMIN) 100 MG tablet Take 100 mg by mouth Daily.      • atorvastatin (LIPITOR) 40 MG tablet Take 40 mg by mouth Every Night.   5/5/2021 at Unknown time   • bumetanide (BUMEX) 1 MG tablet Take 1 mg by mouth Every Morning.   5/6/2021 at Unknown time   • digoxin (LANOXIN) 125 MCG tablet Take 125 mcg by mouth Daily. In the afternoon   5/5/2021 at Unknown time   • donepezil (ARICEPT) 10 MG tablet Take 10 mg by mouth Every Night.   5/5/2021 at Unknown time   • DULoxetine (CYMBALTA) 60 MG capsule Take 60 mg by mouth Daily.   5/6/2021 at Unknown time   • gabapentin (NEURONTIN) 100 MG capsule Take 200 mg by mouth 2 (two) times a day.   5/6/2021 at Unknown time   • Glucagon, rDNA, (Glucagon Emergency) 1 MG kit Inject 1 mg as directed 1 (One) Time As Needed (hypoglycemia).      • hydrALAZINE (APRESOLINE) 50 MG tablet Take 50 mg by mouth 2 (two) times a day. Hold for SBP <110   5/6/2021 at Unknown time   • HYDROcodone-acetaminophen (NORCO) 7.5-325 MG per tablet Take 1 tablet by mouth Every 12 (Twelve) Hours As Needed for Mild Pain  or Moderate  Pain .      • insulin NPH-insulin regular (humuLIN 70/30,novoLIN 70/30) (70-30) 100 UNIT/ML injection Inject 30 Units under the skin into the appropriate area as directed 2 (Two) Times a Day With Meals.   5/6/2021 at Unknown time   • isosorbide mononitrate (IMDUR) 60 MG 24 hr tablet Take 60 mg by mouth 2 (Two) Times a Day.   5/6/2021 at Unknown time   • metFORMIN ER (GLUCOPHAGE-XR) 500 MG 24 hr tablet Take 500 mg by mouth 2 (two) times a day.   5/6/2021 at Unknown time   • Omega-3 Fatty Acids (fish oil) 1000 MG capsule capsule Take 1,000 mg by mouth Daily.   5/6/2021 at Unknown time   • Skin Protectants, Misc. (eucerin) cream Apply 1 application topically to the appropriate area as directed 2 (Two) Times a Day As Needed for Dry Skin. To upper & lower extremities      • ticagrelor (BRILINTA) 90 MG tablet tablet Take 90 mg by mouth 2 (Two) Times a Day.   5/6/2021 at Unknown time   • tiotropium (Spiriva HandiHaler) 18 MCG per inhalation capsule Place 1 capsule into inhaler and inhale Daily. 30 capsule 1 5/6/2021 at Unknown time   • vitamin B-12 (CYANOCOBALAMIN) 1000 MCG tablet Take 1,000 mcg by mouth Daily.   5/6/2021 at Unknown time   , Scheduled Meds:  aspirin, 81 mg, Oral, Daily  atenolol, 100 mg, Oral, Daily  atorvastatin, 40 mg, Oral, Nightly  bumetanide, 1 mg, Oral, BID  digoxin, 125 mcg, Oral, Daily  donepezil, 10 mg, Oral, Nightly  DULoxetine, 60 mg, Oral, Daily  enoxaparin, 1 mg/kg, Subcutaneous, BID  hydrALAZINE, 10 mg, Oral, Q12H  insulin lispro, 0-9 Units, Subcutaneous, TID AC  insulin NPH-insulin regular, 30 Units, Subcutaneous, BID With Meals  isosorbide mononitrate, 60 mg, Oral, BID  sodium chloride, 10 mL, Intravenous, Q12H  vitamin B-12, 1,000 mcg, Oral, Daily    , Continuous Infusions:   , PRN Meds:  •  acetaminophen **OR** acetaminophen **OR** acetaminophen  •  albuterol  •  aluminum-magnesium hydroxide-simethicone  •  dextrose  •  dextrose  •  glucagon (human recombinant)  •   HYDROcodone-acetaminophen  •  insulin lispro **AND** insulin lispro  •  magnesium sulfate **OR** magnesium sulfate in D5W 1g/100mL (PREMIX)  •  melatonin  •  nitroglycerin  •  ondansetron **OR** ondansetron  •  potassium chloride  •  [COMPLETED] Insert peripheral IV **AND** sodium chloride, Allergies:  Codeine    ________________________________________________________     Objective   OBJECTIVE:  Upon today's exam, The patient is obese gentleman lying in bed in no apparent distress.   Head  NC, AT,   Neck  Supple, no adenopathy, Lungs increased efffort,  Decreased air movement.   CV  S1-S2 present no murmur.  Abdomen soft, non tender.  Ext  Edema++, no cyanosis.          Neurologic Exam :    The patient is awake, alert, oriented x 3,  Speech is fluent with good comprehension, follow commands. CN VFFC, EOMI, no facial droop. Tongue midline.  Motor right upper and lower  Extremities 4+/5,  Left upper and lower extremities  5/5. Sensory light touch intact. Reflexes absent, plantar mute, Cerebellum finger to nose intact. Gait is deferred secondary to patient's request.   ________________________________________________________   RESULTS REVIEW:    VITAL SIGNS:   Temp:  [96 °F (35.6 °C)-98.3 °F (36.8 °C)] 97.1 °F (36.2 °C)  Heart Rate:  [] 65  Resp:  [18-20] 18  BP: ()/(40-58) 138/58     LABS:  WBC   Date Value Ref Range Status   05/07/2021 6.80 3.40 - 10.80 10*3/mm3 Final     RBC   Date Value Ref Range Status   05/07/2021 2.53 (L) 4.14 - 5.80 10*6/mm3 Final     Hemoglobin   Date Value Ref Range Status   05/07/2021 7.7 (L) 13.0 - 17.7 g/dL Final     Hematocrit   Date Value Ref Range Status   05/07/2021 23.2 (L) 37.5 - 51.0 % Final     MCV   Date Value Ref Range Status   05/07/2021 91.5 79.0 - 97.0 fL Final     MCH   Date Value Ref Range Status   05/07/2021 30.4 26.6 - 33.0 pg Final     MCHC   Date Value Ref Range Status   05/07/2021 33.3 31.5 - 35.7 g/dL Final     RDW   Date Value Ref Range Status    05/07/2021 14.4 12.3 - 15.4 % Final     RDW-SD   Date Value Ref Range Status   05/07/2021 46.8 37.0 - 54.0 fl Final     MPV   Date Value Ref Range Status   05/07/2021 7.9 6.0 - 12.0 fL Final     Platelets   Date Value Ref Range Status   05/07/2021 270 140 - 450 10*3/mm3 Final     Neutrophil %   Date Value Ref Range Status   05/07/2021 54.6 42.7 - 76.0 % Final     Lymphocyte %   Date Value Ref Range Status   05/07/2021 23.9 19.6 - 45.3 % Final     Monocyte %   Date Value Ref Range Status   05/07/2021 13.1 (H) 5.0 - 12.0 % Final     Eosinophil %   Date Value Ref Range Status   05/07/2021 7.2 (H) 0.3 - 6.2 % Final     Basophil %   Date Value Ref Range Status   05/07/2021 1.2 0.0 - 1.5 % Final     Neutrophils, Absolute   Date Value Ref Range Status   05/07/2021 3.70 1.70 - 7.00 10*3/mm3 Final     Lymphocytes, Absolute   Date Value Ref Range Status   05/07/2021 1.60 0.70 - 3.10 10*3/mm3 Final     Monocytes, Absolute   Date Value Ref Range Status   05/07/2021 0.90 0.10 - 0.90 10*3/mm3 Final     Eosinophils, Absolute   Date Value Ref Range Status   05/07/2021 0.50 (H) 0.00 - 0.40 10*3/mm3 Final     Basophils, Absolute   Date Value Ref Range Status   05/07/2021 0.10 0.00 - 0.20 10*3/mm3 Final     nRBC   Date Value Ref Range Status   05/07/2021 0.0 0.0 - 0.2 /100 WBC Final     Glucose   Date Value Ref Range Status   05/07/2021 104 (H) 65 - 99 mg/dL Final     BUN   Date Value Ref Range Status   05/07/2021 46 (H) 8 - 23 mg/dL Final     Creatinine   Date Value Ref Range Status   05/07/2021 1.72 (H) 0.76 - 1.27 mg/dL Final     Sodium   Date Value Ref Range Status   05/07/2021 141 136 - 145 mmol/L Final     Potassium   Date Value Ref Range Status   05/07/2021 3.9 3.5 - 5.2 mmol/L Final     Chloride   Date Value Ref Range Status   05/07/2021 105 98 - 107 mmol/L Final     CO2   Date Value Ref Range Status   05/07/2021 22.0 22.0 - 29.0 mmol/L Final     Calcium   Date Value Ref Range Status   05/07/2021 8.7 8.6 - 10.5 mg/dL Final      Total Protein   Date Value Ref Range Status   05/06/2021 6.6 6.0 - 8.5 g/dL Final     Albumin   Date Value Ref Range Status   05/06/2021 3.50 3.50 - 5.20 g/dL Final     ALT (SGPT)   Date Value Ref Range Status   05/06/2021 12 1 - 41 U/L Final     AST (SGOT)   Date Value Ref Range Status   05/06/2021 12 1 - 40 U/L Final     Alkaline Phosphatase   Date Value Ref Range Status   05/06/2021 48 39 - 117 U/L Final     Total Bilirubin   Date Value Ref Range Status   05/06/2021 0.6 0.0 - 1.2 mg/dL Final     eGFR Non  Amer   Date Value Ref Range Status   05/07/2021 40 (L) >60 mL/min/1.73 Final     BUN/Creatinine Ratio   Date Value Ref Range Status   05/07/2021 26.7 (H) 7.0 - 25.0 Final     Anion Gap   Date Value Ref Range Status   05/07/2021 14.0 5.0 - 15.0 mmol/L Final       Lab Results   Component Value Date    TSH 3.190 05/06/2021    LDL 89 11/27/2020    HGBA1C 7.5 (H) 05/06/2021    [id number] 1,016 (H) 05/06/2021         IMAGING STUDIES:  XR Chest 1 View    Result Date: 5/6/2021  No active disease. Stable chest  Electronically Signed By-Davis Dee MD On:5/6/2021 6:51 PM This report was finalized on [id number] by  Davis Dee MD.      I reviewed the patient's new clinical results.      ________________________________________________________     PROBLEM LIST:    Paroxysmal atrial fibrillation with rapid ventricular response (CMS/HCC)    Diabetes mellitus due to underlying condition without complication, without long-term current use of insulin (CMS/HCC)    Hx of CABG    Essential hypertension    Dyslipidemia    ANNETTE treated with BiPAP    Right hemiplegia (CMS/HCC)    Immobility syndrome    History of cerebrovascular accident    Hemiplegia, unspecified affecting right dominant side (CMS/HCC)    Diabetic neuropathy (CMS/HCC)    Depressive disorder    Unspecified dementia with behavioral disturbance (CMS/[facility])    Coronary artery disease    Obesity    Chronic obstructive pulmonary disease, unspecified  (CMS/[facility])    Acute congestive heart failure (CMS/[facility])          Assessment/Plan   ASSESSMENT/PLAN:  The patient is a  70 year old gentleman with multiple medical problems including atrial fibrillation, DM, HLD,  HTN, s/p ischemic stroke in 2020  Involving the left PCA distribution who will benefit from use of oral anti  Coagulant.  Neuro status is stable.      Rec:  Will obtain a  CT Scan of Head without contrast, if it does not  Showed any hemorrhages then anti coagulants can be started.     Afib  Plan as  Per  Cardiology.  HLD on Lipitor,  HTN on anti hypertensive meds, CKD,  DM, depression as per primary care team.  Will follow as  Needed.       Modification of stroke risk factors:   - Blood pressure should be less than 130/80 outpatient, HbA1c less than 6.5, LDL less than 70; b12>500 and smoking cessation if applicable. We would be grateful if the primary team / primary care physician would keep a close watch on the above targets.  - Stroke education  - Follow up with neurologist of choice      I discussed the patient's findings and my recommendations with patient and primary care team    Zayra Moran MD  21  09:08 EDT          Electronically signed by Zayra Moran MD at 21 0934     Richar Akhtar MD at 21 0855      Consult Orders    1. Inpatient Cardiology Consult [[id number]] ordered by Rayne Bah APRN at 21 2112                   Cardiology Consult Note    Patient Identification:  Name: Benson Mclean  Age: 70 y.o.  Sex: male  :  1950  MRN: [id number]             Requesting Physician :  Hospitalist Dr. Corea    Reason for Consultation / Chief Complaint : patient co-management  ? New onset afib, history of CVA history of CAD/CABG     History of Present Illness:      This is a 70 year old male with past medical history of     - CAD/CABG 2005, MI  and   status post stent to LAD     Status post stent to LAD 2015  Status post stent to mid  LAD and SVG to marginal branch 09/16/2016.   -history of intermittent atrial fibrillation  - Left MCA territory stroke with persistent right sided weakness and loss of balance, memory issues   - s/p MARSHA that showed biatrial enlargement, smoking in left atrium and left ventricle and LO, LVE with EF 35-40%  - Hypertension, hyperlipidemia   - Diabetes   - ANNETTE  - renal dysfunction previous creatine 3.-- down to 1.7 today   -status post colon surgery (partial colectomy) appendectomy cholecystectomy right 4th toe removal and carpal tunnel surgery  -allergy to codeine  - history of tobacco use       Who presented to the ED after primary care found patient to have irregular heart rate and rhythm; they called to try to get in to see primary cardiologist DR. Rm however since he is on Vacation, advised the ED.  In the ED patient was found to have afib RVR and was started on Cardizem drip and loaded with IV digoxin as well.  Labs in the ED showed troponin 0.035, pro bnp 5625, , BUN 45, cr 1.79 A1C 7.5 hgb 8.5.  Patient reports intermittent chest pain that comes and goes within 20 secs or less.  He does report worsening lower extremity edema, dyspnea on any type of exertion (just getting out of bed), orthopnea.  On examination cardizem drip has been discontinued rate remains 90-100s   Will change to IV bumex- monitor renal function and electrolytes     Chart reviewed:  Cardiac catheterization 09/16/2016 revealed  Left ventricular inferior wall hypokinesis with ejection fraction of 40%.  Left main coronary artery is normal  Proximal LAD stent is patent.  95-99% mid LAD InStent restenoses  RCA and circumflex coronary arteries are chronically occluded.  Sherman is not a graft.  Lima is normal  Left subclavian artery is normal  SVG to marginal branch has proximal 90% disease.  It is a jump graft to 1st and 2nd marginal branches.  First marginal branch has been chronically occluded.  SVG to RCA is patent        Transesophageal  "echocardiogram 11/30/2020.  Biatrial enlargement.  Smoking effect in the left atrium and left ventricle and left atrial appendage.  Mild mitral and aortic regurgitation.  Left ventricle enlargement with diffuse hypocontractility with ejection fraction of 35 to 40%.       Further assessment and plan per Dr. Akhtar   Patient reported \"brain bleed\" but I do not see that documented.  Patient was not started on anticoagulation in Nov after CVA.  Currently on Aspirin and brilinta.      Electronically signed by FABIOLA Jeffrey, 05/07/21, 11:26 AM EDT.     Cardiology attending note :    Patient seen and examined.  Chart reviewed.  Discussed with my nurse practitioner.  Reviewed her documentation.  This is 70-year-old with previous history of CAD, CABG, PCI, recurrent stroke with history of hemorrhagic conversion in the past.  Severe LV dysfunction was in primary care office was found to have irregular heart rhythm therefore sent here.  Patient is in A. fib.      Assessment:      Paroxysmal A. fib with rapid ventricular response  History of recurrent CVA with right hemiplegia, memory issues, balance issue  CAD history of CABG 2005 and PCI to LAD 2009, 2015, NIELS to LAD and SVG to OM 2016  History of MI in 2000 and 2002  Acute on chronic  HFrEF due to ischemic cardiomyopathy LVEF of 35 to 40% by MARSHA 11/30/2020, EF of 40% by echo 11/27/2020  Hypertension,   hyperlipidemia,   diabetes,   ANNTETE, COPD  CKD  S/P partial colectomy, appendectomy, cholecystectomy, right fourth toe removal, carpal tunnel  Allergies/intolerance to codeine  Former smoker  Family history of heart disease in father mother and sister      Recommendations / Plan:        Counseled on smoking cessation  Telemetry  Rate control patient was given IV digoxin and IV Cardizem drip was started.  We will continue rate control  Troponin was mildly elevated probably due to demand ischemia.  Will monitor trend.  Echo to assess LV function.  Monitor blood pressure and " treat accordingly.  Continue statins for dyslipidemia.  Will defer treatment of diabetes COPD to primary team.  Diuresis as tolerated IV Bumex  Monitor renal function and urine output and electrolytes  Patient's last drug-eluting stent was over a year ago will discontinue Brilinta.  Continue baby aspirin and Eliquis as tolerated to prevent thromboembolic from A. fib and recurrent CVA  Patient says he had brain bleed in the past.  Will consult neurology to clear patient for Eliquis.  My review of records MRI from 11/27/2020 revealed  1. Acute left PCA distribution infarct involving the left occipital lobe  and dorsal aspect of the left thalamus.  2. Encephalomalacia involving the left parietal-occipital region related  to remote infarct.  3. Small area of remote lacunar infarction involving the right  cerebellum.  4. Generalized cerebral atrophy.    But no hemorrhage.  Thank you very much for letting me participate in the care of this gentleman  We will follow    Assessment       Diagnosis Plan   1. Chronic obstructive pulmonary disease, unspecified COPD type (CMS/HCC)  Ambulatory Referral to Home Health   2. Atrial fibrillation with rapid ventricular response (CMS/HCC)     3. Anemia, unspecified type               Past Medical History:  Past Medical History:   Diagnosis Date   • Appetite loss    • Brain bleed (CMS/HCC)    • Carpal tunnel syndrome on left     carpal tunnel on left hand   • Diabetic neuropathy (CMS/HCC)    • DM2 (diabetes mellitus, type 2) (CMS/HCC)    • History of angina    • Hyperlipidemia    • Hypogonadism in male    • Obesity    • Sleep apnea    • Stroke (CMS/HCC)    • Unsteady gait      Past Surgical History:  Past Surgical History:   Procedure Laterality Date   • ANGIOPLASTY      X2   • APPENDECTOMY     • CARDIAC CATHETERIZATION  11/13/2015   • CARPAL TUNNEL RELEASE Left 04/29/2018    carpal tunnel- lt hand// other hand surgeries    • CATARACT EXTRACTION, BILATERAL  2002    Dr. Lux Acosta   •  CHOLECYSTECTOMY     • COLON RESECTION  2005   • CORONARY ANGIOPLASTY     • CORONARY ANGIOPLASTY WITH STENT PLACEMENT  11/13/2015    PTCA stent to proximal in stent and mid to distal lad   • CORONARY ANGIOPLASTY WITH STENT PLACEMENT  09/16/2016    PTCA stent to mid lad and stent to vein graft to marginal   • CORONARY ARTERY BYPASS GRAFT  2005    @ [facility]   • CYST REMOVAL      cyst removed from scrotum   • FOOT SURGERY Right 07/17/2018   • FOOT SURGERY Left 02/04/2019   • TOE AMPUTATION Right     right toe removed D/T infected cut that went to the bone      Allergies:  Allergies   Allergen Reactions   • Codeine Itching     Home Meds:  Medications Prior to Admission   Medication Sig Dispense Refill Last Dose   • aspirin 81 MG chewable tablet Chew 81 mg Daily.   5/6/2021 at Unknown time   • atenolol (TENORMIN) 100 MG tablet Take 100 mg by mouth Daily.      • atorvastatin (LIPITOR) 40 MG tablet Take 40 mg by mouth Every Night.   5/5/2021 at Unknown time   • bumetanide (BUMEX) 1 MG tablet Take 1 mg by mouth Every Morning.   5/6/2021 at Unknown time   • digoxin (LANOXIN) 125 MCG tablet Take 125 mcg by mouth Daily. In the afternoon   5/5/2021 at Unknown time   • donepezil (ARICEPT) 10 MG tablet Take 10 mg by mouth Every Night.   5/5/2021 at Unknown time   • DULoxetine (CYMBALTA) 60 MG capsule Take 60 mg by mouth Daily.   5/6/2021 at Unknown time   • gabapentin (NEURONTIN) 100 MG capsule Take 200 mg by mouth 2 (two) times a day.   5/6/2021 at Unknown time   • Glucagon, rDNA, (Glucagon Emergency) 1 MG kit Inject 1 mg as directed 1 (One) Time As Needed (hypoglycemia).      • hydrALAZINE (APRESOLINE) 50 MG tablet Take 50 mg by mouth 2 (two) times a day. Hold for SBP <110   5/6/2021 at Unknown time   • HYDROcodone-acetaminophen (NORCO) 7.5-325 MG per tablet Take 1 tablet by mouth Every 12 (Twelve) Hours As Needed for Mild Pain  or Moderate Pain .      • insulin NPH-insulin regular (humuLIN 70/30,novoLIN 70/30) (70-30) 100 UNIT/ML  injection Inject 30 Units under the skin into the appropriate area as directed 2 (Two) Times a Day With Meals.   5/6/2021 at Unknown time   • isosorbide mononitrate (IMDUR) 60 MG 24 hr tablet Take 60 mg by mouth 2 (Two) Times a Day.   5/6/2021 at Unknown time   • metFORMIN ER (GLUCOPHAGE-XR) 500 MG 24 hr tablet Take 500 mg by mouth 2 (two) times a day.   5/6/2021 at Unknown time   • Omega-3 Fatty Acids (fish oil) 1000 MG capsule capsule Take 1,000 mg by mouth Daily.   5/6/2021 at Unknown time   • Skin Protectants, Misc. (eucerin) cream Apply 1 application topically to the appropriate area as directed 2 (Two) Times a Day As Needed for Dry Skin. To upper & lower extremities      • ticagrelor (BRILINTA) 90 MG tablet tablet Take 90 mg by mouth 2 (Two) Times a Day.   5/6/2021 at Unknown time   • tiotropium (Spiriva HandiHaler) 18 MCG per inhalation capsule Place 1 capsule into inhaler and inhale Daily. 30 capsule 1 5/6/2021 at Unknown time   • vitamin B-12 (CYANOCOBALAMIN) 1000 MCG tablet Take 1,000 mcg by mouth Daily.   5/6/2021 at Unknown time     Current Meds:     Current Facility-Administered Medications:   •  acetaminophen (TYLENOL) tablet 650 mg, 650 mg, Oral, Q4H PRN **OR** acetaminophen (TYLENOL) 160 MG/5ML solution 650 mg, 650 mg, Oral, Q4H PRN **OR** acetaminophen (TYLENOL) suppository 650 mg, 650 mg, Rectal, Q4H PRN, Rayne Bah APRN  •  albuterol (PROVENTIL) nebulizer solution 0.083% 2.5 mg/3mL, 2.5 mg, Nebulization, Q6H PRN, Rayne Bah APRN  •  aluminum-magnesium hydroxide-simethicone (MAALOX MAX) 400-400-40 MG/5ML suspension 15 mL, 15 mL, Oral, Q6H PRN, Rayne Bah APRN  •  aspirin chewable tablet 81 mg, 81 mg, Oral, Daily, Rayne Bah APRN, 81 mg at 05/07/21 0919  •  atenolol (TENORMIN) tablet 100 mg, 100 mg, Oral, Daily, Rayne Bah APRN, 100 mg at 05/07/21 0918  •  atorvastatin (LIPITOR) tablet 40 mg, 40 mg, Oral, Nightly, Rayne Bah APRN, 40 mg at 05/06/21 2135  •  bumetanide  (BUMEX) tablet 1 mg, 1 mg, Oral, BID, Jose Guadalupe Corea, DO  •  dextrose (D50W) 25 g/ 50mL Intravenous Solution 25 g, 25 g, Intravenous, Q15 Min PRN, Rayne Bah APRN  •  dextrose (GLUTOSE) oral gel 15 g, 15 g, Oral, Q15 Min PRN, Rayne Bah, APRN  •  digoxin (LANOXIN) tablet 125 mcg, 125 mcg, Oral, Daily, Rayne Bah APRN, 125 mcg at 05/07/21 1258  •  dilTIAZem (CARDIZEM) 100 mg in 100 mL NS infusion (ADV), 5-15 mg/hr, Intravenous, Continuous, Jacob Amador MD, Stopped at 05/07/21 0909  •  donepezil (ARICEPT) tablet 10 mg, 10 mg, Oral, Nightly, Rayne Bah APRN, 10 mg at 05/06/21 2135  •  DULoxetine (CYMBALTA) DR capsule 60 mg, 60 mg, Oral, Daily, Rayne Bah, APRN, 60 mg at 05/07/21 0918  •  enoxaparin (LOVENOX) syringe 120 mg, 1 mg/kg, Subcutaneous, BID, Rayne Bah APRN, 120 mg at 05/07/21 0918  •  glucagon (human recombinant) (GLUCAGEN DIAGNOSTIC) injection 1 mg, 1 mg, Subcutaneous, PRN, Rayne Bah, APRN  •  hydrALAZINE (APRESOLINE) tablet 10 mg, 10 mg, Oral, Q12H, Jose Guadalupe Corea, DO  •  HYDROcodone-acetaminophen (NORCO) 7.5-325 MG per tablet 1 tablet, 1 tablet, Oral, Q12H PRN, Rayne Bah, APRN  •  insulin lispro (ADMELOG) injection 0-9 Units, 0-9 Units, Subcutaneous, TID AC, 2 Units at 05/07/21 1259 **AND** insulin lispro (ADMELOG) injection 0-9 Units, 0-9 Units, Subcutaneous, PRN, Rayne Bah, APRN  •  insulin NPH-insulin regular (humuLIN 70/30,novoLIN 70/30) injection 30 Units, 30 Units, Subcutaneous, BID With Meals, Rayne Bah APRN, 30 Units at 05/07/21 1827  •  isosorbide mononitrate (IMDUR) 24 hr tablet 60 mg, 60 mg, Oral, BID, Rayne Bah APRN, 60 mg at 05/07/21 0918  •  Magnesium Sulfate 2 gram infusion - Mg less than or equal to 1.5 mg/dL, 2 g, Intravenous, PRN **OR** Magnesium Sulfate 1 gram infusion - Mg 1.6-1.9 mg/dL, 1 g, Intravenous, PRN, Rayne Bah APRN  •  melatonin tablet 5 mg, 5 mg, Oral, Nightly PRN, Rayne Bah APRN  •  nitroglycerin  (NITROSTAT) SL tablet 0.4 mg, 0.4 mg, Sublingual, Q5 Min PRN, Rayne Bah APRN  •  ondansetron (ZOFRAN) tablet 4 mg, 4 mg, Oral, Q6H PRN **OR** ondansetron (ZOFRAN) injection 4 mg, 4 mg, Intravenous, Q6H PRN, Rayne Bah APRN  •  potassium chloride (K-DUR,KLOR-CON) CR tablet 40 mEq, 40 mEq, Oral, PRN, Rayne Bah APRN  •  [COMPLETED] Insert peripheral IV, , , Once **AND** sodium chloride 0.9 % flush 10 mL, 10 mL, Intravenous, PRN, Jacob Amador MD  •  sodium chloride 0.9 % flush 10 mL, 10 mL, Intravenous, Q12H, Rayne Bah APRN, 10 mL at 05/07/21 0918  •  ticagrelor (BRILINTA) tablet 90 mg, 90 mg, Oral, BID, Rayne Bah APRN, 90 mg at 05/07/21 0918  •  vitamin B-12 (CYANOCOBALAMIN) tablet 1,000 mcg, 1,000 mcg, Oral, Daily, Rayne Bah APRN, 1,000 mcg at 05/07/21 0918  Social History:   Social History     Tobacco Use   • Smoking status: Former Smoker     Packs/day: 1.00     Types: Cigarettes   • Smokeless tobacco: Never Used   Substance Use Topics   • Alcohol use: No      Family History:  Family History   Problem Relation Age of Onset   • Heart disease Mother    • Heart disease Father    • Diabetes Sister    • Heart disease Sister    • Diabetes Brother    • Mental illness Brother         Review of Systems : Review of Systems   Constitutional: Positive for malaise/fatigue. Negative for decreased appetite, diaphoresis and fever.   Cardiovascular: Positive for chest pain, dyspnea on exertion, leg swelling and orthopnea. Negative for irregular heartbeat, palpitations and syncope.   Respiratory: Positive for shortness of breath. Negative for cough.    Musculoskeletal: Positive for back pain.   Gastrointestinal: Negative for abdominal pain, melena, nausea and vomiting.   Neurological: Positive for focal weakness, loss of balance and weakness. Negative for dizziness.        CVA in Nov with left side weakness and mobility/balance issues    Psychiatric/Behavioral: Positive for memory loss.   All  "other systems reviewed and are negative.         Constitutional:  Temp:  [97.8 °F (36.6 °C)-98.4 °F (36.9 °C)] 98.2 °F (36.8 °C)  Heart Rate:  [] 63  Resp:  [16-18] 18  BP: ()/(39-77) 119/53    Physical Exam   /53   Pulse 63   Temp 98.2 °F (36.8 °C) (Oral)   Resp 18   Ht 177.8 cm (70\")   Wt 118 kg (261 lb)   SpO2 99%   BMI 37.45 kg/m²   Vitals and nursing note reviewed.   Constitutional:       Appearance: Not in distress. Frail.   Neck:      Vascular: No JVR. JVD normal.   Pulmonary:      Effort: Increased respiratory effort.      Breath sounds: Decreased air movement present. Decreased breath sounds present. No wheezing. No rhonchi. No rales.   Chest:      Chest wall: Not tender to palpatation.   Cardiovascular:      PMI at left midclavicular line. Normal rate. Irregularly irregular rhythm. Normal S1. Normal S2.      Murmurs: There is no murmur.      No gallop. No click. No rub.   Pulses:     Intact distal pulses.   Edema:     Peripheral edema present.     Thigh: bilateral trace edema of the thigh.     Pretibial: bilateral 1+ edema of the pretibial area.     Ankle: bilateral 2+ edema of the ankle.     Feet: bilateral 2+ edema of the feet.  Abdominal:      General: Bowel sounds are normal.      Palpations: Abdomen is soft.      Tenderness: There is no abdominal tenderness.   Musculoskeletal:         General: No tenderness. Skin:     General: Skin is warm and dry.   Neurological:      General: No focal deficit present.      Mental Status: Alert and oriented to person, place and time.           Cardiographics  ECG: EKG tracing was  personally reviewed by me  ECG 12 Lead   Final Result   HEART RATE= 78  bpm   RR Interval= 766  ms   SD Interval=   ms   P Horizontal Axis=   deg   P Front Axis=   deg   QRSD Interval= 106  ms   QT Interval= 390  ms   QRS Axis= 55  deg   T Wave Axis= 194  deg   - ABNORMAL ECG -   Atrial fibrillation   Ventricular premature complex   Repol abnrm suggests ischemia, " diffuse leads   When compared with ECG of 06-May-2021 20:11:31,   Significant rate decrease   Electronically Signed By: Estefany Hyde ([facility]) 07-May-2021 16:47:49   Date and Time of Study: 2021-05-07 04:48:57      ECG 12 Lead   Preliminary Result   HEART RATE= 134  bpm   RR Interval= 448  ms   AR Interval= 91  ms   P Horizontal Axis= 160  deg   P Front Axis= 257  deg   QRSD Interval= 97  ms   QT Interval= 305  ms   QRS Axis= 63  deg   T Wave Axis= 225  deg   - ABNORMAL ECG -   Sinus or ectopic atrial tachycardia   Atrial premature complex   Repol abnrm suggests ischemia, diffuse leads   When compared with ECG of 06-May-2021 17:49:11,   No significant change   Electronically Signed By:    Date and Time of Study: 2021-05-06 20:11:31      ECG 12 Lead   Preliminary Result   HEART RATE= 134  bpm   RR Interval= 448  ms   AR Interval= 126  ms   P Horizontal Axis=   deg   P Front Axis= 79  deg   QRSD Interval= 103  ms   QT Interval= 302  ms   QRS Axis= 62  deg   T Wave Axis= 223  deg   - ABNORMAL ECG -   Sinus tachycardia   Repol abnrm suggests ischemia, diffuse leads   Electronically Signed By:    Date and Time of Study: 2021-05-06 17:49:11      ECG 12 Lead    (Results Pending)       Telemetry: afib rate 90-100s    Echocardiogram:   Results for orders placed during the hospital encounter of 11/27/20    Adult Transesophageal Echo (MARSHA) W/ Cont if Necessary Per Protocol (Cardiology Department)    Interpretation Summary  Date of study  11/30/2020    Procedure performed  Transesophageal echocardiogram.  Doppler color pulsed wave and continuous wave Doppler study    Indications  Recent stroke.  Assess for cardioembolic episodes.    Anesthesia  Provided by anesthesiologist with intravenous diprivan.    Procedure  Patient was brought to outpatient cardiovascular area in a fasting state and patient was given intravenous Diprivan by anesthesiologist.  MARSHA probe was passed without difficulty.    Patient tolerated the procedure  well.  No complications were noted.    Results  Technically satisfactory study.  Mitral valve is thickened with adequate opening motion.  Mild mitral regurgitation is present.  Tricuspid valve is normal.  Aortic valve is tricuspid with adequate opening motion.  Minimal aortic regurgitation is present.  Biatrial enlargement is present.  Left ventricle is enlarged with diffuse hypocontractility with ejection fraction of 35 to 40%.  Smoking effect is present in the left atrium and left ventricle.  Left atrial appendage is free of clots except for presence of smoking effect.  No pericardial effusion or intracardiac thrombus is seen.  Atrial septum is intact.  No PFO is present.  Aorta is normal.    Impression  Biatrial enlargement.  Smoking effect in the left atrium and left ventricle and left atrial appendage.  Mild mitral and aortic regurgitation.  Left ventricle enlargement with diffuse hypocontractility with ejection fraction of 35 to 40%.  ]]]]]]]]]]]]]]]]      Imaging  Chest X-ray:   Imaging Results (Last 24 Hours)     ** No results found for the last 24 hours. **          Lab Review: I have reviewed the labs  Results from last 7 days   Lab Units 05/07/21  0558 05/07/21  0025 05/06/21  1811   TROPONIN T ng/mL 0.044* 0.032* 0.035*     Results from last 7 days   Lab Units 05/07/21  0558   MAGNESIUM mg/dL 1.8     Results from last 7 days   Lab Units 05/07/21  0558   SODIUM mmol/L 141   POTASSIUM mmol/L 3.9   BUN mg/dL 46*   CREATININE mg/dL 1.72*   CALCIUM mg/dL 8.7     @LABRCNTIPbnp@  Results from last 7 days   Lab Units 05/07/21  0558 05/06/21  1811   WBC 10*3/mm3 6.80 8.30   HEMOGLOBIN g/dL 7.7* 8.5*   HEMATOCRIT % 23.2* 25.8*   PLATELETS 10*3/mm3 270 286     Results from last 7 days   Lab Units 05/06/21  1811   INR  0.99   APTT seconds 23.9*             Richar Akhtar MD  5/7/2021, 19:18 EDT      EMR Dragon/Transcription:   Dictated utilizing Dragon dictation    Electronically signed by Hermilo  Richar Jeffries MD at 05/07/21 1947          Discharge Summary      Jose Guadalupe Corea DO at 05/08/21 1333            Date of Admission: 5/6/2021    Date of Discharge:  5/8/2021    Length of stay:  LOS: 2 days     Presenting Problem:   Atrial fibrillation with rapid ventricular response (CMS/HCC) [I48.91]  Anemia, unspecified type [D64.9]      Active Diagnosis During Hospital Stay/Discharge Diagnoses/Course by Diagnoses:     A. fib RVR, paroxysmal A. fib  -rate controlled   -echo EF 35 % but similar to previous  -Continue digoxin and atenolol home medication  -troponin minimally elevated likely demand ischemia and CKD related no chest pain  -placed patient on Eliquis to prevent further stroke as below based on cardiology recs   -Cardiology followed     Acute on chronic HFrEF due to A. Fib/hypertension  -diuresed  -resume home bumex   -Monitor volume status     CKD stage IIIb  -Cr ranged from 1.7-3.0 the past few months  -Cr 2.0 at d/c     HTN, CAD remote CABG  -Continue aspirin, Imdur  -stent was over 1 yr ago per cardiology thus they d/c the Brilinta     Type II DM/neuropathy  -Home Lantus 30 units, SSI  -Continue Cymbalta  -Monitor and adjust as needed     Chronic stroke w/right hemiaplasia  -On aspirin and changed to Eliquis as above  -CT head 5/8/21: showed no bleeding and no acute process, thus Neurology cleared for AC use      Dementia, depression  -Continue Aricept, Cymbalta     ANNETTE  -can use home unit if able to bring in     Anemia likely AOCD and CKD  -hgb stable at d/c 9.3  -recommend repeat CBC in one week after d/c  -Fe panel showed mild Fe deficency    Active Hospital Problems    Diagnosis  POA   • **Paroxysmal atrial fibrillation with rapid ventricular response (CMS/HCC) [I48.0]  Yes   • Obesity [E66.9]  Yes   • Right hemiplegia (CMS/HCC) [G81.91]  Yes   • History of cerebrovascular accident [Z86.73]  Not Applicable   • Depressive disorder [F32.9]  Yes   • Immobility syndrome [M62.3]  Yes   • Hemiplegia,  unspecified affecting right dominant side (CMS/[facility]) [G81.91]  Yes   • Unspecified dementia with behavioral disturbance (CMS/[facility]) [F03.91]  Yes   • Chronic obstructive pulmonary disease, unspecified (CMS/[facility]) [J44.9]  Yes   • Essential hypertension [I10]  Yes   • Dyslipidemia [E78.5]  Yes   • Hx of CABG [Z95.1]  Not Applicable   • Diabetes mellitus due to underlying condition without complication, without long-term current use of insulin (CMS/[facility]) [E08.9]  Yes   • Coronary artery disease [I25.10]  Yes   • ANNETTE treated with BiPAP [G47.33]  Yes   • Acute congestive heart failure (CMS/[facility]) [I50.9]  Yes   • Diabetic neuropathy (CMS/[facility]) [E11.40]  Yes      Resolved Hospital Problems   No resolved problems to display.         Hospital Course  Patient is a 70 y.o. male presented with weighted heart rate had A. fib RVR but was placed on Cardizem Cardizem drip and heart rate was able to be controlled on his home medication regimen after drip was stopped.  He was followed by cardiology and neurology while here. Rate was controlled, he was cleared for AC. Worked with PT. He was back to his baseline and felt stable to d/c with close outpt follow up.       Procedures Performed:none as noted above         Consults:   Consults     Date and Time Order Name Status Description    5/7/2021  7:48 PM Inpatient Neurology Consult Other (see comments) Completed     5/6/2021  9:12 PM Inpatient Cardiology Consult Completed     5/6/2021  8:23 PM Hospitalist (on-call MD unless specified) Completed           Pertinent Test Results:     Results from last 7 days   Lab Units 05/07/21  0558 05/06/21  1811   WBC 10*3/mm3 6.80 8.30   HEMOGLOBIN g/dL 7.7* 8.5*   HEMATOCRIT % 23.2* 25.8*   PLATELETS 10*3/mm3 270 286     Results from last 7 days   Lab Units 05/08/21  0258 05/07/21  0558 05/06/21  1811   SODIUM mmol/L 141 141 142   POTASSIUM mmol/L 4.4 3.9 4.4   CHLORIDE mmol/L 105 105 102   CO2 mmol/L 23.0 22.0 24.0   BUN mg/dL 48* 46* 45*   CREATININE  mg/dL 2.02* 1.72* 1.79*   CALCIUM mg/dL 8.6 8.7 9.1   BILIRUBIN mg/dL  --   --  0.6   ALK PHOS U/L  --   --  48   ALT (SGPT) U/L  --   --  12   AST (SGOT) U/L  --   --  12   GLUCOSE mg/dL 98 104* 178*       Microbiology Results (last 10 days)     ** No results found for the last 240 hours. **          Results for orders placed during the hospital encounter of 05/06/21    Adult Transthoracic Echo Complete w/ Color, Spectral and Contrast if necessary per protocol    Interpretation Summary  Technically difficult study due to poor acoustic windows, not all left ventricular walls are well visualized therefore Lumason contrast was given to assess LV function..  LVE with basal inferior wall hypokinesis and septal dyskinesis, estimated LV ejection fraction of 35%  Normal RV size  Biatrial enlargement seen.  Aortic valve, mitral valve, tricuspid valve appears structurally normal, no significant regurgitation seen.  No pericardial effusion seen.  Proximal aorta appears normal in size.      Imaging Results (All)     Procedure Component Value Units Date/Time    CT Head Without Contrast [[id number]] Collected: 05/08/21 1307     Updated: 05/08/21 1316    Narrative:      CT HEAD WO CONTRAST-     Date of Exam: 5/8/2021 12:50 PM     Indication: Evaluate for intracranial hemorrhage prior to starting  anticoagulation, history of stroke; J44.9-Chronic obstructive pulmonary  disease, unspecified; I48.91-Unspecified atrial fibrillation;  D64.9-Anemia, unspecified.       Comparison: CT head 11/29/2020, MRI brain 11/27/2020.     Technique:  Without contrast, contiguous axial CT images of the head  were obtained from skull base to vertex.   Automated exposure control  and iterative reconstruction methods were used.     FINDINGS:  No acute intracranial hemorrhage is identified. There has been expected  evolution from previously seen infarcts in the left occipital lobe and  left thalamus with increased hypodensity. Changes of old infarct in  the  left parieto-occipital region and right cerebellar hemisphere appear  stable. No new loss of gray-white matter differentiation is identified.  There is mild global volume loss with mild probable chronic small vessel  ischemic change. Atelectatic calcifications are seen in vessels the  skull base. No fractures are seen. Included paranasal sinuses and  mastoid air cells are clear.       Impression:      1.No acute intracranial abnormality/hemorrhage.  2.Remote infarcts, as described above.  3.Mild global volume loss and mild probable chronic small vessel  ischemic change.     Electronically Signed By-Samantha Navarro MD On:5/8/2021 1:14 PM  This report was finalized on [id number] by  Samantha Navarro MD.    XR Chest 1 View [[id number]] Collected: 05/06/21 1851     Updated: 05/06/21 1853    Narrative:      Examination: XR CHEST 1 VW-     Date of Exam: 5/6/2021 6:20 PM     Indication: soa.     Comparison: 11/27/2020     Technique: 1 view of the chest      Findings:  The heart is enlarged with changes of midline sternotomy. The pulmonary  vascular markings are within normal limits. The lungs are clear. The  osseous structures are normal for age.       Impression:      No active disease. Stable chest     Electronically Signed By-Davis Dee MD On:5/6/2021 6:51 PM  This report was finalized on [id number] by  Davis Dee MD.            Condition on Discharge:  Chronically ill but stable     Vital Signs  Temp:  [96 °F (35.6 °C)-98.3 °F (36.8 °C)] 97.5 °F (36.4 °C)  Heart Rate:  [61-87] 87  Resp:  [18-20] 18  BP: (102-138)/(41-58) 120/56    Physical Exam:  Physical Exam  Cardiovascular:      Rate and Rhythm: Normal rate.   Pulmonary:      Effort: No respiratory distress.   Abdominal:      General: There is no distension.      Palpations: Abdomen is soft.   Skin:     General: Skin is warm.   Neurological:      Mental Status: He is alert.      Comments: Mild right sided weakness from previous stroke   Psychiatric:          Behavior: Behavior normal.         Discharge Disposition  Home-Health Care [facility] [place]    Discharge Medications     Discharge Medications      New Medications      Instructions Start Date   apixaban 5 MG tablet tablet  Commonly known as: ELIQUIS   5 mg, Oral, Every 12 Hours Scheduled         Continue These Medications      Instructions Start Date   aspirin 81 MG chewable tablet   81 mg, Oral, Daily      atenolol 100 MG tablet  Commonly known as: TENORMIN   100 mg, Oral, Daily      atorvastatin 40 MG tablet  Commonly known as: LIPITOR   40 mg, Oral, Nightly      bumetanide 1 MG tablet  Commonly known as: BUMEX   1 mg, Oral, Every Morning      digoxin 125 MCG tablet  Commonly known as: LANOXIN   125 mcg, Oral, Daily Digoxin, In the afternoon      donepezil 10 MG tablet  Commonly known as: ARICEPT   10 mg, Oral, Nightly      DULoxetine 60 MG capsule  Commonly known as: CYMBALTA   60 mg, Oral, Daily      eucerin cream   1 application, Topical, 2 Times Daily PRN, To upper & lower extremities       fish oil 1000 MG capsule capsule   1,000 mg, Oral, Daily      gabapentin 100 MG capsule  Commonly known as: NEURONTIN   200 mg, Oral, 2 times daily      Glucagon Emergency 1 MG kit   1 mg, Injection, Once As Needed      hydrALAZINE 50 MG tablet  Commonly known as: APRESOLINE   50 mg, Oral, 2 times daily, Hold for SBP <110       HYDROcodone-acetaminophen 7.5-325 MG per tablet  Commonly known as: NORCO   1 tablet, Oral, Every 12 Hours PRN      insulin NPH-insulin regular (70-30) 100 UNIT/ML injection  Commonly known as: humuLIN 70/30,novoLIN 70/30   30 Units, Subcutaneous, 2 Times Daily With Meals      isosorbide mononitrate 60 MG 24 hr tablet  Commonly known as: IMDUR   60 mg, Oral, 2 Times Daily      metFORMIN  MG 24 hr tablet  Commonly known as: GLUCOPHAGE-XR   500 mg, Oral, 2 times daily      tiotropium 18 MCG per inhalation capsule  Commonly known as: Spiriva HandiHaler   1 capsule, Inhalation, Daily - RT      vitamin  B-12 1000 MCG tablet  Commonly known as: CYANOCOBALAMIN   1,000 mcg, Oral, Daily         Stop These Medications    ticagrelor 90 MG tablet tablet  Commonly known as: BRILINTA            Discharge Diet:   Diet Instructions     Diet: Consistent Carbohydrate, Renal      Discharge Diet:  Consistent Carbohydrate  Renal             Activity at Discharge:   Activity Instructions     Gradually Increase Activity Until at Pre-Hospitalization Level            Follow-up Appointments  Future Appointments   Date Time Provider Department Center   5/27/2021 11:40 AM Jose G Rm MD [facility]   9/16/2021  1:00 PM HERI Chung DPM [facility]     Additional Instructions for the Follow-ups that You Need to Schedule     Ambulatory Referral to Home Health   As directed      [facility] resumption of care    Order Comments: [facility] of care     Face to Face Visit Date: 5/7/2021    Follow-up provider for Plan of Care?: I treated the patient in an acute care facility and will not continue treatment after discharge.    Follow-up provider: KJ MAS [[id number]]    Reason/Clinical Findings: resumption of care    Describe mobility limitations that make leaving home difficult: resumption of care    Nursing/Therapeutic Services Requested: Other    Frequency: 1 Week 1         Discharge Follow-up with PCP   As directed       Currently Documented PCP:    Kj Mas APRN    PCP Phone Number:    [phone]     Follow Up Details: one week         Discharge Follow-up with Specified Provider: cardiology; 2 Weeks   As directed      To: cardiology    Follow Up: 2 Weeks               Test Results Pending at Discharge       Risk for Readmission (LACE) Score: 12 (5/8/2021  6:01 AM)      This patient has been examined wearing appropriate Personal Protective Equipment . 05/08/21      Time: Discharge 40 min with face-to-face history exam, writing of prescriptions, and documenting discharge data including care  coordination with the nursing staff.      Electronically signed by Jose Guadalupe Corea DO, 05/08/21, 13:33 EDT.                Electronically signed by Jose Guadalupe Corea DO at 05/08/21 2816

## 2021-05-12 ENCOUNTER — READMISSION MANAGEMENT (OUTPATIENT)
Dept: CALL CENTER | Facility: HOSPITAL | Age: 71
End: 2021-05-12

## 2021-05-12 NOTE — OUTREACH NOTE
CHF Week 1 Survey      Responses   [facility] patient discharged from?  [facility]   Does the patient have one of the following disease processes/diagnoses(primary or secondary)?  CHF   CHF Week 1 attempt successful?  Yes   Call start time  1059   Call end time  1107   Discharge diagnosis  A fib with RVR,  Acute on chronic HFrEF due to A. Fib/hypertension   Is patient permission given to speak with other caregiver?  No   Meds reviewed with patient/caregiver?  Yes   Is the patient having any side effects they believe may be caused by any medication additions or changes?  No   Does the patient have all medications ordered at discharge?  Yes   Is the patient taking all medications as directed (includes completed medication regime)?  Yes   Does the patient have a primary care provider?   Yes   Does the patient have an appointment with their PCP within 7 days of discharge?  Yes   Comments regarding PCP  PCP Samantha HICKS, patient states that he is seeing PCP today    Has the patient kept scheduled appointments due by today?  Yes   What is the Home health agency?   [facility]    Has home health visited the patient within 72 hours of discharge?  Yes   DME comments  Patient states that he has a walker and a wheelchair.    Pulse Ox monitoring  None [Patient states that  nurse will be bring back telemedicine home monitoring system. ]   Psychosocial issues?  No   Did the patient receive a copy of their discharge instructions?  Yes   Nursing interventions  Reviewed instructions with patient   What is the patient's perception of their health status since discharge?  Improving   Nursing interventions  Nurse provided patient education   Is the patient weighing daily?  No   Does the patient have scales?  No [ to bring home monitoring system]   Daily weight interventions  Education provided on importance of daily weight   Is the patient able to teach back Heart Failure diet management?  Yes   Is the patient able to teach back  signs and symptoms of worsening condition? (i.e. weight gain, shortness of air, etc.)  Yes   If the patient is a current smoker, are they able to teach back resources for cessation?  Not a smoker   Is the patient/caregiver able to teach back the hierarchy of who to call/visit for symptoms/problems? PCP, Specialist, Home health nurse, Urgent Care, ED, 911  Yes    CHF Week 1 call completed?  Yes          Barb Willoughby RN

## 2021-05-18 ENCOUNTER — APPOINTMENT (OUTPATIENT)
Dept: GENERAL RADIOLOGY | Facility: HOSPITAL | Age: 71
End: 2021-05-18

## 2021-05-18 ENCOUNTER — HOSPITAL ENCOUNTER (INPATIENT)
Facility: HOSPITAL | Age: 71
LOS: 16 days | Discharge: NURSING, REHAB OR LONG-TERM CARE | End: 2021-06-03
Attending: EMERGENCY MEDICINE | Admitting: INTERNAL MEDICINE

## 2021-05-18 DIAGNOSIS — R09.02 HYPOXIA: ICD-10-CM

## 2021-05-18 DIAGNOSIS — I48.91 ATRIAL FIBRILLATION, UNSPECIFIED TYPE (HCC): ICD-10-CM

## 2021-05-18 DIAGNOSIS — M19.90 ARTHRITIS: ICD-10-CM

## 2021-05-18 DIAGNOSIS — R94.39 ABNORMAL NUCLEAR STRESS TEST: Primary | ICD-10-CM

## 2021-05-18 DIAGNOSIS — I20.0 UNSTABLE ANGINA (HCC): ICD-10-CM

## 2021-05-18 DIAGNOSIS — N18.9 CHRONIC RENAL FAILURE, UNSPECIFIED CKD STAGE: ICD-10-CM

## 2021-05-18 DIAGNOSIS — D64.9 CHRONIC ANEMIA: ICD-10-CM

## 2021-05-18 DIAGNOSIS — I50.9 ACUTE ON CHRONIC CONGESTIVE HEART FAILURE, UNSPECIFIED HEART FAILURE TYPE (HCC): ICD-10-CM

## 2021-05-18 LAB
ALBUMIN SERPL-MCNC: 3.7 G/DL (ref 3.5–5.2)
ALBUMIN/GLOB SERPL: 1.1 G/DL
ALP SERPL-CCNC: 56 U/L (ref 39–117)
ALT SERPL W P-5'-P-CCNC: 11 U/L (ref 1–41)
ANION GAP SERPL CALCULATED.3IONS-SCNC: 15 MMOL/L (ref 5–15)
APTT PPP: 24 SECONDS (ref 24–31)
AST SERPL-CCNC: 12 U/L (ref 1–40)
BASOPHILS # BLD AUTO: 0.2 10*3/MM3 (ref 0–0.2)
BASOPHILS NFR BLD AUTO: 2.7 % (ref 0–1.5)
BILIRUB SERPL-MCNC: 0.5 MG/DL (ref 0–1.2)
BUN SERPL-MCNC: 50 MG/DL (ref 8–23)
BUN/CREAT SERPL: 24.4 (ref 7–25)
CALCIUM SPEC-SCNC: 9.2 MG/DL (ref 8.6–10.5)
CHLORIDE SERPL-SCNC: 103 MMOL/L (ref 98–107)
CO2 SERPL-SCNC: 24 MMOL/L (ref 22–29)
CREAT SERPL-MCNC: 2.05 MG/DL (ref 0.76–1.27)
DEPRECATED RDW RBC AUTO: 46.8 FL (ref 37–54)
DIGOXIN SERPL-MCNC: 0.8 NG/ML (ref 0.6–1.2)
EOSINOPHIL # BLD AUTO: 0.5 10*3/MM3 (ref 0–0.4)
EOSINOPHIL NFR BLD AUTO: 6 % (ref 0.3–6.2)
ERYTHROCYTE [DISTWIDTH] IN BLOOD BY AUTOMATED COUNT: 14.4 % (ref 12.3–15.4)
GFR SERPL CREATININE-BSD FRML MDRD: 32 ML/MIN/1.73
GLOBULIN UR ELPH-MCNC: 3.4 GM/DL
GLUCOSE SERPL-MCNC: 181 MG/DL (ref 65–99)
HCT VFR BLD AUTO: 26.9 % (ref 37.5–51)
HGB BLD-MCNC: 8.8 G/DL (ref 13–17.7)
INR PPP: 1.07 (ref 0.93–1.1)
LYMPHOCYTES # BLD AUTO: 2 10*3/MM3 (ref 0.7–3.1)
LYMPHOCYTES NFR BLD AUTO: 24.7 % (ref 19.6–45.3)
MAGNESIUM SERPL-MCNC: 2 MG/DL (ref 1.6–2.4)
MCH RBC QN AUTO: 29.8 PG (ref 26.6–33)
MCHC RBC AUTO-ENTMCNC: 32.7 G/DL (ref 31.5–35.7)
MCV RBC AUTO: 91.2 FL (ref 79–97)
MONOCYTES # BLD AUTO: 0.8 10*3/MM3 (ref 0.1–0.9)
MONOCYTES NFR BLD AUTO: 9.8 % (ref 5–12)
NEUTROPHILS NFR BLD AUTO: 4.6 10*3/MM3 (ref 1.7–7)
NEUTROPHILS NFR BLD AUTO: 56.8 % (ref 42.7–76)
NRBC BLD AUTO-RTO: 0.1 /100 WBC (ref 0–0.2)
NT-PROBNP SERPL-MCNC: ABNORMAL PG/ML (ref 0–900)
PLATELET # BLD AUTO: 358 10*3/MM3 (ref 140–450)
PMV BLD AUTO: 8.3 FL (ref 6–12)
POTASSIUM SERPL-SCNC: 4.7 MMOL/L (ref 3.5–5.2)
PROT SERPL-MCNC: 7.1 G/DL (ref 6–8.5)
PROTHROMBIN TIME: 11.7 SECONDS (ref 9.6–11.7)
RBC # BLD AUTO: 2.95 10*6/MM3 (ref 4.14–5.8)
SARS-COV-2 RNA PNL SPEC NAA+PROBE: NORMAL
SODIUM SERPL-SCNC: 142 MMOL/L (ref 136–145)
TROPONIN T SERPL-MCNC: 0.04 NG/ML (ref 0–0.03)
WBC # BLD AUTO: 8.1 10*3/MM3 (ref 3.4–10.8)

## 2021-05-18 PROCEDURE — 25010000002 FUROSEMIDE PER 20 MG: Performed by: EMERGENCY MEDICINE

## 2021-05-18 PROCEDURE — 93005 ELECTROCARDIOGRAM TRACING: CPT | Performed by: EMERGENCY MEDICINE

## 2021-05-18 PROCEDURE — 85610 PROTHROMBIN TIME: CPT | Performed by: EMERGENCY MEDICINE

## 2021-05-18 PROCEDURE — 99285 EMERGENCY DEPT VISIT HI MDM: CPT

## 2021-05-18 PROCEDURE — 87635 SARS-COV-2 COVID-19 AMP PRB: CPT | Performed by: EMERGENCY MEDICINE

## 2021-05-18 PROCEDURE — 84484 ASSAY OF TROPONIN QUANT: CPT | Performed by: EMERGENCY MEDICINE

## 2021-05-18 PROCEDURE — 80053 COMPREHEN METABOLIC PANEL: CPT | Performed by: EMERGENCY MEDICINE

## 2021-05-18 PROCEDURE — 71045 X-RAY EXAM CHEST 1 VIEW: CPT

## 2021-05-18 PROCEDURE — 85730 THROMBOPLASTIN TIME PARTIAL: CPT | Performed by: EMERGENCY MEDICINE

## 2021-05-18 PROCEDURE — 80162 ASSAY OF DIGOXIN TOTAL: CPT | Performed by: EMERGENCY MEDICINE

## 2021-05-18 PROCEDURE — 83880 ASSAY OF NATRIURETIC PEPTIDE: CPT | Performed by: EMERGENCY MEDICINE

## 2021-05-18 PROCEDURE — 85025 COMPLETE CBC W/AUTO DIFF WBC: CPT | Performed by: EMERGENCY MEDICINE

## 2021-05-18 PROCEDURE — 83735 ASSAY OF MAGNESIUM: CPT | Performed by: EMERGENCY MEDICINE

## 2021-05-18 RX ORDER — FUROSEMIDE 10 MG/ML
40 INJECTION INTRAMUSCULAR; INTRAVENOUS ONCE
Status: COMPLETED | OUTPATIENT
Start: 2021-05-18 | End: 2021-05-18

## 2021-05-18 RX ORDER — SODIUM CHLORIDE 0.9 % (FLUSH) 0.9 %
10 SYRINGE (ML) INJECTION AS NEEDED
Status: DISCONTINUED | OUTPATIENT
Start: 2021-05-18 | End: 2021-06-03 | Stop reason: HOSPADM

## 2021-05-18 RX ADMIN — FUROSEMIDE 40 MG: 10 INJECTION, SOLUTION INTRAMUSCULAR; INTRAVENOUS at 23:20

## 2021-05-19 ENCOUNTER — READMISSION MANAGEMENT (OUTPATIENT)
Dept: CALL CENTER | Facility: HOSPITAL | Age: 71
End: 2021-05-19

## 2021-05-19 PROBLEM — I50.9 ACUTE ON CHRONIC CONGESTIVE HEART FAILURE (HCC): Status: ACTIVE | Noted: 2021-05-19

## 2021-05-19 PROBLEM — E11.65 TYPE 2 DIABETES MELLITUS WITH HYPERGLYCEMIA (HCC): Status: ACTIVE | Noted: 2021-05-19

## 2021-05-19 LAB
ANION GAP SERPL CALCULATED.3IONS-SCNC: 14 MMOL/L (ref 5–15)
BUN SERPL-MCNC: 51 MG/DL (ref 8–23)
BUN/CREAT SERPL: 25.5 (ref 7–25)
CALCIUM SPEC-SCNC: 8.9 MG/DL (ref 8.6–10.5)
CHLORIDE SERPL-SCNC: 104 MMOL/L (ref 98–107)
CO2 SERPL-SCNC: 25 MMOL/L (ref 22–29)
CREAT SERPL-MCNC: 2 MG/DL (ref 0.76–1.27)
GFR SERPL CREATININE-BSD FRML MDRD: 33 ML/MIN/1.73
GLUCOSE BLDC GLUCOMTR-MCNC: 140 MG/DL (ref 70–105)
GLUCOSE BLDC GLUCOMTR-MCNC: 151 MG/DL (ref 70–105)
GLUCOSE BLDC GLUCOMTR-MCNC: 162 MG/DL (ref 70–105)
GLUCOSE BLDC GLUCOMTR-MCNC: 165 MG/DL (ref 70–105)
GLUCOSE SERPL-MCNC: 169 MG/DL (ref 65–99)
HBA1C MFR BLD: 7.2 % (ref 3.5–5.6)
HOLD SPECIMEN: NORMAL
MAGNESIUM SERPL-MCNC: 1.9 MG/DL (ref 1.6–2.4)
NT-PROBNP SERPL-MCNC: ABNORMAL PG/ML (ref 0–900)
POTASSIUM SERPL-SCNC: 4.6 MMOL/L (ref 3.5–5.2)
SODIUM SERPL-SCNC: 143 MMOL/L (ref 136–145)
TROPONIN T SERPL-MCNC: 0.04 NG/ML (ref 0–0.03)
TROPONIN T SERPL-MCNC: 0.04 NG/ML (ref 0–0.03)
TROPONIN T SERPL-MCNC: 0.05 NG/ML (ref 0–0.03)

## 2021-05-19 PROCEDURE — 63710000001 INSULIN LISPRO (HUMAN) PER 5 UNITS: Performed by: HOSPITALIST

## 2021-05-19 PROCEDURE — 94799 UNLISTED PULMONARY SVC/PX: CPT

## 2021-05-19 PROCEDURE — 99222 1ST HOSP IP/OBS MODERATE 55: CPT | Performed by: INTERNAL MEDICINE

## 2021-05-19 PROCEDURE — 83036 HEMOGLOBIN GLYCOSYLATED A1C: CPT | Performed by: INTERNAL MEDICINE

## 2021-05-19 PROCEDURE — 82962 GLUCOSE BLOOD TEST: CPT

## 2021-05-19 PROCEDURE — 63710000001 INSULIN LISPRO (HUMAN) PER 5 UNITS: Performed by: INTERNAL MEDICINE

## 2021-05-19 PROCEDURE — 99223 1ST HOSP IP/OBS HIGH 75: CPT | Performed by: HOSPITALIST

## 2021-05-19 PROCEDURE — 83880 ASSAY OF NATRIURETIC PEPTIDE: CPT | Performed by: INTERNAL MEDICINE

## 2021-05-19 PROCEDURE — G0378 HOSPITAL OBSERVATION PER HR: HCPCS

## 2021-05-19 PROCEDURE — 80048 BASIC METABOLIC PNL TOTAL CA: CPT | Performed by: INTERNAL MEDICINE

## 2021-05-19 PROCEDURE — 63710000001 INSULIN ISOPHANE & REGULAR PER 5 UNITS: Performed by: HOSPITALIST

## 2021-05-19 PROCEDURE — 94640 AIRWAY INHALATION TREATMENT: CPT

## 2021-05-19 PROCEDURE — 83735 ASSAY OF MAGNESIUM: CPT | Performed by: INTERNAL MEDICINE

## 2021-05-19 PROCEDURE — 25010000002 FUROSEMIDE PER 20 MG: Performed by: HOSPITALIST

## 2021-05-19 PROCEDURE — 84484 ASSAY OF TROPONIN QUANT: CPT | Performed by: INTERNAL MEDICINE

## 2021-05-19 PROCEDURE — 63710000001 INSULIN ISOPHANE & REGULAR PER 5 UNITS: Performed by: NURSE PRACTITIONER

## 2021-05-19 RX ORDER — DULOXETIN HYDROCHLORIDE 30 MG/1
60 CAPSULE, DELAYED RELEASE ORAL DAILY
Status: DISCONTINUED | OUTPATIENT
Start: 2021-05-19 | End: 2021-06-03 | Stop reason: HOSPADM

## 2021-05-19 RX ORDER — ONDANSETRON 2 MG/ML
4 INJECTION INTRAMUSCULAR; INTRAVENOUS EVERY 6 HOURS PRN
Status: DISCONTINUED | OUTPATIENT
Start: 2021-05-19 | End: 2021-06-03 | Stop reason: HOSPADM

## 2021-05-19 RX ORDER — FUROSEMIDE 10 MG/ML
40 INJECTION INTRAMUSCULAR; INTRAVENOUS EVERY 12 HOURS
Status: DISCONTINUED | OUTPATIENT
Start: 2021-05-19 | End: 2021-05-23

## 2021-05-19 RX ORDER — ATENOLOL 50 MG/1
100 TABLET ORAL DAILY
Status: DISCONTINUED | OUTPATIENT
Start: 2021-05-19 | End: 2021-06-03 | Stop reason: HOSPADM

## 2021-05-19 RX ORDER — BUMETANIDE 1 MG/1
1 TABLET ORAL EVERY MORNING
Status: DISCONTINUED | OUTPATIENT
Start: 2021-05-19 | End: 2021-05-19

## 2021-05-19 RX ORDER — MELATONIN
1000 DAILY
Status: DISCONTINUED | OUTPATIENT
Start: 2021-05-19 | End: 2021-06-03 | Stop reason: HOSPADM

## 2021-05-19 RX ORDER — ASPIRIN 81 MG/1
81 TABLET, CHEWABLE ORAL DAILY
Status: DISCONTINUED | OUTPATIENT
Start: 2021-05-19 | End: 2021-06-03 | Stop reason: HOSPADM

## 2021-05-19 RX ORDER — NITROGLYCERIN 0.4 MG/1
0.4 TABLET SUBLINGUAL
Status: DISCONTINUED | OUTPATIENT
Start: 2021-05-19 | End: 2021-06-03 | Stop reason: HOSPADM

## 2021-05-19 RX ORDER — MELATONIN
1000 DAILY
COMMUNITY

## 2021-05-19 RX ORDER — HYDRALAZINE HYDROCHLORIDE 25 MG/1
50 TABLET, FILM COATED ORAL 2 TIMES DAILY PRN
Status: DISCONTINUED | OUTPATIENT
Start: 2021-05-19 | End: 2021-06-03 | Stop reason: HOSPADM

## 2021-05-19 RX ORDER — ATORVASTATIN CALCIUM 40 MG/1
40 TABLET, FILM COATED ORAL NIGHTLY
Status: DISCONTINUED | OUTPATIENT
Start: 2021-05-19 | End: 2021-06-03 | Stop reason: HOSPADM

## 2021-05-19 RX ORDER — DONEPEZIL HYDROCHLORIDE 5 MG/1
10 TABLET, FILM COATED ORAL NIGHTLY
Status: DISCONTINUED | OUTPATIENT
Start: 2021-05-19 | End: 2021-06-03 | Stop reason: HOSPADM

## 2021-05-19 RX ORDER — LANOLIN ALCOHOL/MO/W.PET/CERES
1000 CREAM (GRAM) TOPICAL DAILY
Status: DISCONTINUED | OUTPATIENT
Start: 2021-05-19 | End: 2021-06-03 | Stop reason: HOSPADM

## 2021-05-19 RX ORDER — SODIUM CHLORIDE 0.9 % (FLUSH) 0.9 %
3 SYRINGE (ML) INJECTION EVERY 12 HOURS SCHEDULED
Status: DISCONTINUED | OUTPATIENT
Start: 2021-05-19 | End: 2021-06-03 | Stop reason: HOSPADM

## 2021-05-19 RX ORDER — HYDROCODONE BITARTRATE AND ACETAMINOPHEN 7.5; 325 MG/1; MG/1
1 TABLET ORAL EVERY 12 HOURS PRN
Status: DISCONTINUED | OUTPATIENT
Start: 2021-05-19 | End: 2021-06-03 | Stop reason: HOSPADM

## 2021-05-19 RX ORDER — NICOTINE POLACRILEX 4 MG
15 LOZENGE BUCCAL
Status: DISCONTINUED | OUTPATIENT
Start: 2021-05-19 | End: 2021-06-03 | Stop reason: HOSPADM

## 2021-05-19 RX ORDER — DEXTROSE MONOHYDRATE 25 G/50ML
25 INJECTION, SOLUTION INTRAVENOUS
Status: DISCONTINUED | OUTPATIENT
Start: 2021-05-19 | End: 2021-06-03 | Stop reason: HOSPADM

## 2021-05-19 RX ORDER — INSULIN LISPRO 100 [IU]/ML
0-14 INJECTION, SOLUTION INTRAVENOUS; SUBCUTANEOUS AS NEEDED
Status: DISCONTINUED | OUTPATIENT
Start: 2021-05-19 | End: 2021-06-03 | Stop reason: HOSPADM

## 2021-05-19 RX ORDER — ISOSORBIDE MONONITRATE 60 MG/1
60 TABLET, EXTENDED RELEASE ORAL 2 TIMES DAILY
Status: DISCONTINUED | OUTPATIENT
Start: 2021-05-19 | End: 2021-06-03 | Stop reason: HOSPADM

## 2021-05-19 RX ORDER — INSULIN LISPRO 100 [IU]/ML
0-14 INJECTION, SOLUTION INTRAVENOUS; SUBCUTANEOUS
Status: DISCONTINUED | OUTPATIENT
Start: 2021-05-19 | End: 2021-06-03 | Stop reason: HOSPADM

## 2021-05-19 RX ORDER — ONDANSETRON 4 MG/1
4 TABLET, FILM COATED ORAL EVERY 6 HOURS PRN
Status: DISCONTINUED | OUTPATIENT
Start: 2021-05-19 | End: 2021-06-03 | Stop reason: HOSPADM

## 2021-05-19 RX ORDER — GABAPENTIN 100 MG/1
200 CAPSULE ORAL EVERY 12 HOURS SCHEDULED
Status: DISCONTINUED | OUTPATIENT
Start: 2021-05-19 | End: 2021-06-03 | Stop reason: HOSPADM

## 2021-05-19 RX ORDER — DIGOXIN 125 MCG
125 TABLET ORAL
Status: DISCONTINUED | OUTPATIENT
Start: 2021-05-19 | End: 2021-06-03 | Stop reason: HOSPADM

## 2021-05-19 RX ORDER — SODIUM CHLORIDE 0.9 % (FLUSH) 0.9 %
3-10 SYRINGE (ML) INJECTION AS NEEDED
Status: DISCONTINUED | OUTPATIENT
Start: 2021-05-19 | End: 2021-06-03 | Stop reason: HOSPADM

## 2021-05-19 RX ADMIN — GABAPENTIN 200 MG: 100 CAPSULE ORAL at 09:13

## 2021-05-19 RX ADMIN — ASPIRIN 81 MG CHEWABLE TABLET 81 MG: 81 TABLET CHEWABLE at 09:13

## 2021-05-19 RX ADMIN — IPRATROPIUM BROMIDE 0.5 MG: 0.5 SOLUTION RESPIRATORY (INHALATION) at 15:35

## 2021-05-19 RX ADMIN — INSULIN LISPRO 3 UNITS: 100 INJECTION, SOLUTION INTRAVENOUS; SUBCUTANEOUS at 08:30

## 2021-05-19 RX ADMIN — IPRATROPIUM BROMIDE 0.5 MG: 0.5 SOLUTION RESPIRATORY (INHALATION) at 07:01

## 2021-05-19 RX ADMIN — Medication 3 ML: at 21:58

## 2021-05-19 RX ADMIN — Medication 1000 UNITS: at 09:13

## 2021-05-19 RX ADMIN — INSULIN LISPRO 3 UNITS: 100 INJECTION, SOLUTION INTRAVENOUS; SUBCUTANEOUS at 17:22

## 2021-05-19 RX ADMIN — Medication 3 ML: at 09:14

## 2021-05-19 RX ADMIN — ATORVASTATIN CALCIUM 40 MG: 40 TABLET, FILM COATED ORAL at 21:57

## 2021-05-19 RX ADMIN — IPRATROPIUM BROMIDE 0.5 MG: 0.5 SOLUTION RESPIRATORY (INHALATION) at 11:05

## 2021-05-19 RX ADMIN — INSULIN HUMAN 30 UNITS: 100 INJECTION, SUSPENSION SUBCUTANEOUS at 17:23

## 2021-05-19 RX ADMIN — ISOSORBIDE MONONITRATE 60 MG: 60 TABLET, EXTENDED RELEASE ORAL at 21:57

## 2021-05-19 RX ADMIN — APIXABAN 5 MG: 5 TABLET, FILM COATED ORAL at 09:13

## 2021-05-19 RX ADMIN — BUMETANIDE 1 MG: 1 TABLET ORAL at 06:00

## 2021-05-19 RX ADMIN — INSULIN HUMAN 30 UNITS: 100 INJECTION, SUSPENSION SUBCUTANEOUS at 09:00

## 2021-05-19 RX ADMIN — ISOSORBIDE MONONITRATE 60 MG: 60 TABLET, EXTENDED RELEASE ORAL at 09:13

## 2021-05-19 RX ADMIN — DONEPEZIL HYDROCHLORIDE 10 MG: 5 TABLET, FILM COATED ORAL at 21:57

## 2021-05-19 RX ADMIN — HYDROCODONE BITARTRATE AND ACETAMINOPHEN 1 TABLET: 7.5; 325 TABLET ORAL at 17:22

## 2021-05-19 RX ADMIN — Medication 3 ML: at 03:28

## 2021-05-19 RX ADMIN — DIGOXIN 125 MCG: 0.12 TABLET ORAL at 11:56

## 2021-05-19 RX ADMIN — GABAPENTIN 200 MG: 100 CAPSULE ORAL at 21:58

## 2021-05-19 RX ADMIN — ATENOLOL 100 MG: 50 TABLET ORAL at 09:13

## 2021-05-19 RX ADMIN — FUROSEMIDE 40 MG: 10 INJECTION, SOLUTION INTRAMUSCULAR; INTRAVENOUS at 15:16

## 2021-05-19 RX ADMIN — IPRATROPIUM BROMIDE 0.5 MG: 0.5 SOLUTION RESPIRATORY (INHALATION) at 18:46

## 2021-05-19 RX ADMIN — CYANOCOBALAMIN TAB 1000 MCG 1000 MCG: 1000 TAB at 09:13

## 2021-05-19 RX ADMIN — APIXABAN 5 MG: 5 TABLET, FILM COATED ORAL at 21:57

## 2021-05-19 RX ADMIN — DULOXETINE 60 MG: 30 CAPSULE, DELAYED RELEASE ORAL at 09:13

## 2021-05-19 NOTE — OUTREACH NOTE
CHF Week 2 Survey      Responses   [facility] patient discharged from?  [facility]   Does the patient have one of the following disease processes/diagnoses(primary or secondary)?  CHF   Week 2 attempt successful?  No   Unsuccessful attempts  Attempt 1   Revoke  Readmitted          Annie Aaron RN

## 2021-05-20 LAB
ANION GAP SERPL CALCULATED.3IONS-SCNC: 11 MMOL/L (ref 5–15)
BUN SERPL-MCNC: 54 MG/DL (ref 8–23)
BUN/CREAT SERPL: 25.2 (ref 7–25)
CALCIUM SPEC-SCNC: 8.7 MG/DL (ref 8.6–10.5)
CHLORIDE SERPL-SCNC: 105 MMOL/L (ref 98–107)
CO2 SERPL-SCNC: 26 MMOL/L (ref 22–29)
CREAT SERPL-MCNC: 2.14 MG/DL (ref 0.76–1.27)
GFR SERPL CREATININE-BSD FRML MDRD: 31 ML/MIN/1.73
GLUCOSE BLDC GLUCOMTR-MCNC: 140 MG/DL (ref 70–105)
GLUCOSE BLDC GLUCOMTR-MCNC: 160 MG/DL (ref 70–105)
GLUCOSE BLDC GLUCOMTR-MCNC: 172 MG/DL (ref 70–105)
GLUCOSE BLDC GLUCOMTR-MCNC: 61 MG/DL (ref 70–105)
GLUCOSE BLDC GLUCOMTR-MCNC: 65 MG/DL (ref 70–105)
GLUCOSE BLDC GLUCOMTR-MCNC: 71 MG/DL (ref 70–105)
GLUCOSE SERPL-MCNC: 55 MG/DL (ref 65–99)
MAGNESIUM SERPL-MCNC: 2 MG/DL (ref 1.6–2.4)
POTASSIUM SERPL-SCNC: 4.2 MMOL/L (ref 3.5–5.2)
QT INTERVAL: 413 MS
SODIUM SERPL-SCNC: 142 MMOL/L (ref 136–145)
WHOLE BLOOD HOLD SPECIMEN: NORMAL

## 2021-05-20 PROCEDURE — 94799 UNLISTED PULMONARY SVC/PX: CPT

## 2021-05-20 PROCEDURE — G0378 HOSPITAL OBSERVATION PER HR: HCPCS

## 2021-05-20 PROCEDURE — 82962 GLUCOSE BLOOD TEST: CPT

## 2021-05-20 PROCEDURE — 63710000001 INSULIN LISPRO (HUMAN) PER 5 UNITS: Performed by: HOSPITALIST

## 2021-05-20 PROCEDURE — 83735 ASSAY OF MAGNESIUM: CPT | Performed by: INTERNAL MEDICINE

## 2021-05-20 PROCEDURE — 97162 PT EVAL MOD COMPLEX 30 MIN: CPT

## 2021-05-20 PROCEDURE — 25010000002 FUROSEMIDE PER 20 MG: Performed by: HOSPITALIST

## 2021-05-20 PROCEDURE — 63710000001 INSULIN LISPRO (HUMAN) PER 5 UNITS: Performed by: INTERNAL MEDICINE

## 2021-05-20 PROCEDURE — 99232 SBSQ HOSP IP/OBS MODERATE 35: CPT | Performed by: HOSPITALIST

## 2021-05-20 PROCEDURE — 97535 SELF CARE MNGMENT TRAINING: CPT

## 2021-05-20 PROCEDURE — 63710000001 INSULIN ISOPHANE & REGULAR PER 5 UNITS: Performed by: HOSPITALIST

## 2021-05-20 PROCEDURE — 99233 SBSQ HOSP IP/OBS HIGH 50: CPT | Performed by: INTERNAL MEDICINE

## 2021-05-20 PROCEDURE — 80048 BASIC METABOLIC PNL TOTAL CA: CPT | Performed by: INTERNAL MEDICINE

## 2021-05-20 PROCEDURE — 63710000001 INSULIN ISOPHANE & REGULAR PER 5 UNITS: Performed by: NURSE PRACTITIONER

## 2021-05-20 PROCEDURE — 97165 OT EVAL LOW COMPLEX 30 MIN: CPT

## 2021-05-20 RX ADMIN — Medication 3 ML: at 09:49

## 2021-05-20 RX ADMIN — ASPIRIN 81 MG CHEWABLE TABLET 81 MG: 81 TABLET CHEWABLE at 09:48

## 2021-05-20 RX ADMIN — ATORVASTATIN CALCIUM 40 MG: 40 TABLET, FILM COATED ORAL at 20:33

## 2021-05-20 RX ADMIN — CYANOCOBALAMIN TAB 1000 MCG 1000 MCG: 1000 TAB at 09:49

## 2021-05-20 RX ADMIN — FUROSEMIDE 40 MG: 10 INJECTION, SOLUTION INTRAMUSCULAR; INTRAVENOUS at 02:08

## 2021-05-20 RX ADMIN — GABAPENTIN 200 MG: 100 CAPSULE ORAL at 09:48

## 2021-05-20 RX ADMIN — ATENOLOL 100 MG: 50 TABLET ORAL at 09:48

## 2021-05-20 RX ADMIN — INSULIN HUMAN 30 UNITS: 100 INJECTION, SUSPENSION SUBCUTANEOUS at 18:02

## 2021-05-20 RX ADMIN — DEXTROSE 15 G: 15 GEL ORAL at 06:56

## 2021-05-20 RX ADMIN — DIGOXIN 125 MCG: 0.12 TABLET ORAL at 14:51

## 2021-05-20 RX ADMIN — APIXABAN 5 MG: 5 TABLET, FILM COATED ORAL at 09:49

## 2021-05-20 RX ADMIN — APIXABAN 5 MG: 5 TABLET, FILM COATED ORAL at 20:33

## 2021-05-20 RX ADMIN — IPRATROPIUM BROMIDE 0.5 MG: 0.5 SOLUTION RESPIRATORY (INHALATION) at 18:31

## 2021-05-20 RX ADMIN — GABAPENTIN 200 MG: 100 CAPSULE ORAL at 20:33

## 2021-05-20 RX ADMIN — FUROSEMIDE 40 MG: 10 INJECTION, SOLUTION INTRAMUSCULAR; INTRAVENOUS at 15:01

## 2021-05-20 RX ADMIN — ISOSORBIDE MONONITRATE 60 MG: 60 TABLET, EXTENDED RELEASE ORAL at 09:48

## 2021-05-20 RX ADMIN — DULOXETINE 60 MG: 30 CAPSULE, DELAYED RELEASE ORAL at 09:48

## 2021-05-20 RX ADMIN — Medication 3 ML: at 20:37

## 2021-05-20 RX ADMIN — DONEPEZIL HYDROCHLORIDE 10 MG: 5 TABLET, FILM COATED ORAL at 20:33

## 2021-05-20 RX ADMIN — INSULIN LISPRO 3 UNITS: 100 INJECTION, SOLUTION INTRAVENOUS; SUBCUTANEOUS at 18:01

## 2021-05-20 RX ADMIN — IPRATROPIUM BROMIDE 0.5 MG: 0.5 SOLUTION RESPIRATORY (INHALATION) at 15:20

## 2021-05-20 RX ADMIN — ISOSORBIDE MONONITRATE 60 MG: 60 TABLET, EXTENDED RELEASE ORAL at 20:33

## 2021-05-20 RX ADMIN — IPRATROPIUM BROMIDE 0.5 MG: 0.5 SOLUTION RESPIRATORY (INHALATION) at 11:47

## 2021-05-20 RX ADMIN — Medication 1000 UNITS: at 09:48

## 2021-05-21 ENCOUNTER — APPOINTMENT (OUTPATIENT)
Dept: NUCLEAR MEDICINE | Facility: HOSPITAL | Age: 71
End: 2021-05-21

## 2021-05-21 LAB
ANION GAP SERPL CALCULATED.3IONS-SCNC: 11 MMOL/L (ref 5–15)
BH CV REST NUCLEAR ISOTOPE DOSE: 7.9 MCI
BH CV STRESS BP STAGE 1: NORMAL
BH CV STRESS BP STAGE 2: NORMAL
BH CV STRESS COMMENTS STAGE 1: NORMAL
BH CV STRESS COMMENTS STAGE 2: NORMAL
BH CV STRESS DOSE REGADENOSON STAGE 1: 0.4
BH CV STRESS DURATION MIN STAGE 1: 0
BH CV STRESS DURATION MIN STAGE 2: 4
BH CV STRESS DURATION SEC STAGE 1: 10
BH CV STRESS DURATION SEC STAGE 2: 0
BH CV STRESS HR STAGE 1: 62
BH CV STRESS HR STAGE 2: 96
BH CV STRESS NUCLEAR ISOTOPE DOSE: 21.9 MCI
BH CV STRESS PROTOCOL 1: NORMAL
BH CV STRESS RECOVERY BP: NORMAL MMHG
BH CV STRESS RECOVERY HR: 87 BPM
BH CV STRESS STAGE 1: 1
BH CV STRESS STAGE 2: 2
BUN SERPL-MCNC: 51 MG/DL (ref 8–23)
BUN/CREAT SERPL: 25.4 (ref 7–25)
CALCIUM SPEC-SCNC: 8.8 MG/DL (ref 8.6–10.5)
CHLORIDE SERPL-SCNC: 102 MMOL/L (ref 98–107)
CO2 SERPL-SCNC: 26 MMOL/L (ref 22–29)
CREAT SERPL-MCNC: 2.01 MG/DL (ref 0.76–1.27)
GFR SERPL CREATININE-BSD FRML MDRD: 33 ML/MIN/1.73
GLUCOSE BLDC GLUCOMTR-MCNC: 145 MG/DL (ref 70–105)
GLUCOSE BLDC GLUCOMTR-MCNC: 164 MG/DL (ref 70–105)
GLUCOSE BLDC GLUCOMTR-MCNC: 187 MG/DL (ref 70–105)
GLUCOSE BLDC GLUCOMTR-MCNC: 208 MG/DL (ref 70–105)
GLUCOSE SERPL-MCNC: 154 MG/DL (ref 65–99)
MAGNESIUM SERPL-MCNC: 1.9 MG/DL (ref 1.6–2.4)
MAXIMAL PREDICTED HEART RATE: 150 BPM
PERCENT MAX PREDICTED HR: 64 %
POTASSIUM SERPL-SCNC: 4.2 MMOL/L (ref 3.5–5.2)
SODIUM SERPL-SCNC: 139 MMOL/L (ref 136–145)
STRESS BASELINE BP: NORMAL MMHG
STRESS BASELINE HR: 61 BPM
STRESS PERCENT HR: 75 %
STRESS POST PEAK BP: NORMAL MMHG
STRESS POST PEAK HR: 96 BPM
STRESS TARGET HR: 128 BPM

## 2021-05-21 PROCEDURE — 93018 CV STRESS TEST I&R ONLY: CPT | Performed by: INTERNAL MEDICINE

## 2021-05-21 PROCEDURE — 63710000001 INSULIN ISOPHANE & REGULAR PER 5 UNITS: Performed by: NURSE PRACTITIONER

## 2021-05-21 PROCEDURE — 25010000002 REGADENOSON 0.4 MG/5ML SOLUTION: Performed by: HOSPITALIST

## 2021-05-21 PROCEDURE — 25010000002 FUROSEMIDE PER 20 MG: Performed by: INTERNAL MEDICINE

## 2021-05-21 PROCEDURE — 97116 GAIT TRAINING THERAPY: CPT

## 2021-05-21 PROCEDURE — 93010 ELECTROCARDIOGRAM REPORT: CPT | Performed by: INTERNAL MEDICINE

## 2021-05-21 PROCEDURE — 94799 UNLISTED PULMONARY SVC/PX: CPT

## 2021-05-21 PROCEDURE — 97530 THERAPEUTIC ACTIVITIES: CPT

## 2021-05-21 PROCEDURE — 82962 GLUCOSE BLOOD TEST: CPT

## 2021-05-21 PROCEDURE — 83735 ASSAY OF MAGNESIUM: CPT | Performed by: INTERNAL MEDICINE

## 2021-05-21 PROCEDURE — G0378 HOSPITAL OBSERVATION PER HR: HCPCS

## 2021-05-21 PROCEDURE — 63710000001 INSULIN LISPRO (HUMAN) PER 5 UNITS: Performed by: HOSPITALIST

## 2021-05-21 PROCEDURE — 25010000002 FUROSEMIDE PER 20 MG: Performed by: HOSPITALIST

## 2021-05-21 PROCEDURE — 78452 HT MUSCLE IMAGE SPECT MULT: CPT

## 2021-05-21 PROCEDURE — 63710000001 INSULIN ISOPHANE & REGULAR PER 5 UNITS: Performed by: HOSPITALIST

## 2021-05-21 PROCEDURE — 97535 SELF CARE MNGMENT TRAINING: CPT

## 2021-05-21 PROCEDURE — 99232 SBSQ HOSP IP/OBS MODERATE 35: CPT | Performed by: INTERNAL MEDICINE

## 2021-05-21 PROCEDURE — 99232 SBSQ HOSP IP/OBS MODERATE 35: CPT | Performed by: HOSPITALIST

## 2021-05-21 PROCEDURE — 93005 ELECTROCARDIOGRAM TRACING: CPT | Performed by: HOSPITALIST

## 2021-05-21 PROCEDURE — 80048 BASIC METABOLIC PNL TOTAL CA: CPT | Performed by: INTERNAL MEDICINE

## 2021-05-21 PROCEDURE — 0 TECHNETIUM SESTAMIBI: Performed by: HOSPITALIST

## 2021-05-21 PROCEDURE — 63710000001 INSULIN LISPRO (HUMAN) PER 5 UNITS: Performed by: INTERNAL MEDICINE

## 2021-05-21 PROCEDURE — A9500 TC99M SESTAMIBI: HCPCS | Performed by: HOSPITALIST

## 2021-05-21 PROCEDURE — 93017 CV STRESS TEST TRACING ONLY: CPT

## 2021-05-21 PROCEDURE — 78452 HT MUSCLE IMAGE SPECT MULT: CPT | Performed by: INTERNAL MEDICINE

## 2021-05-21 RX ORDER — FUROSEMIDE 10 MG/ML
40 INJECTION INTRAMUSCULAR; INTRAVENOUS ONCE
Status: COMPLETED | OUTPATIENT
Start: 2021-05-21 | End: 2021-05-21

## 2021-05-21 RX ORDER — IPRATROPIUM BROMIDE AND ALBUTEROL SULFATE 2.5; .5 MG/3ML; MG/3ML
3 SOLUTION RESPIRATORY (INHALATION) EVERY 4 HOURS PRN
Status: DISCONTINUED | OUTPATIENT
Start: 2021-05-21 | End: 2021-06-03 | Stop reason: HOSPADM

## 2021-05-21 RX ADMIN — IPRATROPIUM BROMIDE 0.5 MG: 0.5 SOLUTION RESPIRATORY (INHALATION) at 10:57

## 2021-05-21 RX ADMIN — REGADENOSON 0.4 MG: 0.08 INJECTION, SOLUTION INTRAVENOUS at 12:15

## 2021-05-21 RX ADMIN — DULOXETINE 60 MG: 30 CAPSULE, DELAYED RELEASE ORAL at 08:33

## 2021-05-21 RX ADMIN — ATENOLOL 100 MG: 50 TABLET ORAL at 13:14

## 2021-05-21 RX ADMIN — CYANOCOBALAMIN TAB 1000 MCG 1000 MCG: 1000 TAB at 08:34

## 2021-05-21 RX ADMIN — TECHNETIUM TC 99M SESTAMIBI 1 DOSE: 1 INJECTION INTRAVENOUS at 09:20

## 2021-05-21 RX ADMIN — Medication 3 ML: at 20:00

## 2021-05-21 RX ADMIN — GABAPENTIN 200 MG: 100 CAPSULE ORAL at 08:33

## 2021-05-21 RX ADMIN — ISOSORBIDE MONONITRATE 60 MG: 60 TABLET, EXTENDED RELEASE ORAL at 08:34

## 2021-05-21 RX ADMIN — ASPIRIN 81 MG CHEWABLE TABLET 81 MG: 81 TABLET CHEWABLE at 08:34

## 2021-05-21 RX ADMIN — GABAPENTIN 200 MG: 100 CAPSULE ORAL at 20:00

## 2021-05-21 RX ADMIN — INSULIN LISPRO 3 UNITS: 100 INJECTION, SOLUTION INTRAVENOUS; SUBCUTANEOUS at 17:50

## 2021-05-21 RX ADMIN — ISOSORBIDE MONONITRATE 60 MG: 60 TABLET, EXTENDED RELEASE ORAL at 20:00

## 2021-05-21 RX ADMIN — FUROSEMIDE 40 MG: 10 INJECTION, SOLUTION INTRAMUSCULAR; INTRAVENOUS at 21:25

## 2021-05-21 RX ADMIN — ATORVASTATIN CALCIUM 40 MG: 40 TABLET, FILM COATED ORAL at 20:00

## 2021-05-21 RX ADMIN — Medication 3 ML: at 08:34

## 2021-05-21 RX ADMIN — IPRATROPIUM BROMIDE 0.5 MG: 0.5 SOLUTION RESPIRATORY (INHALATION) at 07:16

## 2021-05-21 RX ADMIN — IPRATROPIUM BROMIDE 0.5 MG: 0.5 SOLUTION RESPIRATORY (INHALATION) at 18:29

## 2021-05-21 RX ADMIN — APIXABAN 5 MG: 5 TABLET, FILM COATED ORAL at 08:33

## 2021-05-21 RX ADMIN — DONEPEZIL HYDROCHLORIDE 10 MG: 5 TABLET, FILM COATED ORAL at 20:00

## 2021-05-21 RX ADMIN — INSULIN LISPRO 3 UNITS: 100 INJECTION, SOLUTION INTRAVENOUS; SUBCUTANEOUS at 11:21

## 2021-05-21 RX ADMIN — IPRATROPIUM BROMIDE AND ALBUTEROL SULFATE 3 ML: 2.5; .5 SOLUTION RESPIRATORY (INHALATION) at 20:21

## 2021-05-21 RX ADMIN — TECHNETIUM TC 99M SESTAMIBI 1 DOSE: 1 INJECTION INTRAVENOUS at 12:15

## 2021-05-21 RX ADMIN — FUROSEMIDE 40 MG: 10 INJECTION, SOLUTION INTRAMUSCULAR; INTRAVENOUS at 02:15

## 2021-05-21 RX ADMIN — FUROSEMIDE 40 MG: 10 INJECTION, SOLUTION INTRAMUSCULAR; INTRAVENOUS at 14:24

## 2021-05-21 RX ADMIN — IPRATROPIUM BROMIDE 0.5 MG: 0.5 SOLUTION RESPIRATORY (INHALATION) at 14:56

## 2021-05-21 RX ADMIN — INSULIN HUMAN 30 UNITS: 100 INJECTION, SUSPENSION SUBCUTANEOUS at 17:50

## 2021-05-21 RX ADMIN — Medication 1000 UNITS: at 08:33

## 2021-05-22 LAB
ANION GAP SERPL CALCULATED.3IONS-SCNC: 12 MMOL/L (ref 5–15)
BUN SERPL-MCNC: 52 MG/DL (ref 8–23)
BUN/CREAT SERPL: 29.1 (ref 7–25)
CALCIUM SPEC-SCNC: 8.6 MG/DL (ref 8.6–10.5)
CHLORIDE SERPL-SCNC: 102 MMOL/L (ref 98–107)
CO2 SERPL-SCNC: 26 MMOL/L (ref 22–29)
CREAT SERPL-MCNC: 1.79 MG/DL (ref 0.76–1.27)
DEPRECATED RDW RBC AUTO: 45.9 FL (ref 37–54)
ERYTHROCYTE [DISTWIDTH] IN BLOOD BY AUTOMATED COUNT: 14.3 % (ref 12.3–15.4)
GFR SERPL CREATININE-BSD FRML MDRD: 38 ML/MIN/1.73
GLUCOSE BLDC GLUCOMTR-MCNC: 108 MG/DL (ref 70–105)
GLUCOSE BLDC GLUCOMTR-MCNC: 145 MG/DL (ref 70–105)
GLUCOSE BLDC GLUCOMTR-MCNC: 153 MG/DL (ref 70–105)
GLUCOSE BLDC GLUCOMTR-MCNC: 230 MG/DL (ref 70–105)
GLUCOSE SERPL-MCNC: 97 MG/DL (ref 65–99)
HCT VFR BLD AUTO: 27 % (ref 37.5–51)
HGB BLD-MCNC: 8.9 G/DL (ref 13–17.7)
MAGNESIUM SERPL-MCNC: 2 MG/DL (ref 1.6–2.4)
MAGNESIUM SERPL-MCNC: 2 MG/DL (ref 1.6–2.4)
MCH RBC QN AUTO: 29.8 PG (ref 26.6–33)
MCHC RBC AUTO-ENTMCNC: 33.1 G/DL (ref 31.5–35.7)
MCV RBC AUTO: 90 FL (ref 79–97)
PLATELET # BLD AUTO: 292 10*3/MM3 (ref 140–450)
PMV BLD AUTO: 7.9 FL (ref 6–12)
POTASSIUM SERPL-SCNC: 3.9 MMOL/L (ref 3.5–5.2)
RBC # BLD AUTO: 3 10*6/MM3 (ref 4.14–5.8)
SODIUM SERPL-SCNC: 140 MMOL/L (ref 136–145)
TSH SERPL DL<=0.05 MIU/L-ACNC: 2.93 UIU/ML (ref 0.27–4.2)
WBC # BLD AUTO: 6.6 10*3/MM3 (ref 3.4–10.8)

## 2021-05-22 PROCEDURE — 63710000001 INSULIN LISPRO (HUMAN) PER 5 UNITS: Performed by: HOSPITALIST

## 2021-05-22 PROCEDURE — 83735 ASSAY OF MAGNESIUM: CPT | Performed by: INTERNAL MEDICINE

## 2021-05-22 PROCEDURE — 94799 UNLISTED PULMONARY SVC/PX: CPT

## 2021-05-22 PROCEDURE — 99232 SBSQ HOSP IP/OBS MODERATE 35: CPT | Performed by: HOSPITALIST

## 2021-05-22 PROCEDURE — 63710000001 INSULIN ISOPHANE & REGULAR PER 5 UNITS: Performed by: HOSPITALIST

## 2021-05-22 PROCEDURE — 93010 ELECTROCARDIOGRAM REPORT: CPT | Performed by: INTERNAL MEDICINE

## 2021-05-22 PROCEDURE — 82962 GLUCOSE BLOOD TEST: CPT

## 2021-05-22 PROCEDURE — 25010000002 FUROSEMIDE PER 20 MG: Performed by: HOSPITALIST

## 2021-05-22 PROCEDURE — G0378 HOSPITAL OBSERVATION PER HR: HCPCS

## 2021-05-22 PROCEDURE — 93005 ELECTROCARDIOGRAM TRACING: CPT | Performed by: INTERNAL MEDICINE

## 2021-05-22 PROCEDURE — 80048 BASIC METABOLIC PNL TOTAL CA: CPT | Performed by: INTERNAL MEDICINE

## 2021-05-22 PROCEDURE — 85027 COMPLETE CBC AUTOMATED: CPT | Performed by: INTERNAL MEDICINE

## 2021-05-22 PROCEDURE — 63710000001 INSULIN LISPRO (HUMAN) PER 5 UNITS: Performed by: INTERNAL MEDICINE

## 2021-05-22 PROCEDURE — 84443 ASSAY THYROID STIM HORMONE: CPT | Performed by: INTERNAL MEDICINE

## 2021-05-22 PROCEDURE — 63710000001 INSULIN ISOPHANE & REGULAR PER 5 UNITS: Performed by: NURSE PRACTITIONER

## 2021-05-22 PROCEDURE — 99233 SBSQ HOSP IP/OBS HIGH 50: CPT | Performed by: INTERNAL MEDICINE

## 2021-05-22 RX ORDER — SODIUM CHLORIDE 0.9 % (FLUSH) 0.9 %
3 SYRINGE (ML) INJECTION EVERY 12 HOURS SCHEDULED
Status: CANCELLED | OUTPATIENT
Start: 2021-05-22

## 2021-05-22 RX ORDER — SODIUM CHLORIDE 0.9 % (FLUSH) 0.9 %
3-10 SYRINGE (ML) INJECTION AS NEEDED
Status: CANCELLED | OUTPATIENT
Start: 2021-05-22

## 2021-05-22 RX ADMIN — IPRATROPIUM BROMIDE 0.5 MG: 0.5 SOLUTION RESPIRATORY (INHALATION) at 06:41

## 2021-05-22 RX ADMIN — Medication 3 ML: at 21:38

## 2021-05-22 RX ADMIN — ASPIRIN 81 MG CHEWABLE TABLET 81 MG: 81 TABLET CHEWABLE at 08:56

## 2021-05-22 RX ADMIN — DIGOXIN 125 MCG: 0.12 TABLET ORAL at 12:20

## 2021-05-22 RX ADMIN — Medication 3 ML: at 08:56

## 2021-05-22 RX ADMIN — ATENOLOL 100 MG: 50 TABLET ORAL at 08:56

## 2021-05-22 RX ADMIN — ISOSORBIDE MONONITRATE 60 MG: 60 TABLET, EXTENDED RELEASE ORAL at 08:56

## 2021-05-22 RX ADMIN — FUROSEMIDE 40 MG: 10 INJECTION, SOLUTION INTRAMUSCULAR; INTRAVENOUS at 05:28

## 2021-05-22 RX ADMIN — IPRATROPIUM BROMIDE 0.5 MG: 0.5 SOLUTION RESPIRATORY (INHALATION) at 11:31

## 2021-05-22 RX ADMIN — INSULIN LISPRO 5 UNITS: 100 INJECTION, SOLUTION INTRAVENOUS; SUBCUTANEOUS at 17:51

## 2021-05-22 RX ADMIN — GABAPENTIN 200 MG: 100 CAPSULE ORAL at 21:38

## 2021-05-22 RX ADMIN — INSULIN HUMAN 30 UNITS: 100 INJECTION, SUSPENSION SUBCUTANEOUS at 18:03

## 2021-05-22 RX ADMIN — ATORVASTATIN CALCIUM 40 MG: 40 TABLET, FILM COATED ORAL at 21:38

## 2021-05-22 RX ADMIN — ISOSORBIDE MONONITRATE 60 MG: 60 TABLET, EXTENDED RELEASE ORAL at 21:38

## 2021-05-22 RX ADMIN — CYANOCOBALAMIN TAB 1000 MCG 1000 MCG: 1000 TAB at 08:56

## 2021-05-22 RX ADMIN — INSULIN HUMAN 30 UNITS: 100 INJECTION, SUSPENSION SUBCUTANEOUS at 08:56

## 2021-05-22 RX ADMIN — GABAPENTIN 200 MG: 100 CAPSULE ORAL at 08:56

## 2021-05-22 RX ADMIN — FUROSEMIDE 40 MG: 10 INJECTION, SOLUTION INTRAMUSCULAR; INTRAVENOUS at 17:51

## 2021-05-22 RX ADMIN — DULOXETINE 60 MG: 30 CAPSULE, DELAYED RELEASE ORAL at 08:55

## 2021-05-22 RX ADMIN — Medication 1000 UNITS: at 08:56

## 2021-05-22 RX ADMIN — DONEPEZIL HYDROCHLORIDE 10 MG: 5 TABLET, FILM COATED ORAL at 21:38

## 2021-05-22 RX ADMIN — IPRATROPIUM BROMIDE 0.5 MG: 0.5 SOLUTION RESPIRATORY (INHALATION) at 19:42

## 2021-05-22 RX ADMIN — IPRATROPIUM BROMIDE 0.5 MG: 0.5 SOLUTION RESPIRATORY (INHALATION) at 14:39

## 2021-05-23 LAB
ANION GAP SERPL CALCULATED.3IONS-SCNC: 12 MMOL/L (ref 5–15)
BASOPHILS # BLD AUTO: 0 10*3/MM3 (ref 0–0.2)
BASOPHILS NFR BLD AUTO: 0.3 % (ref 0–1.5)
BUN SERPL-MCNC: 48 MG/DL (ref 8–23)
BUN/CREAT SERPL: 28.2 (ref 7–25)
CALCIUM SPEC-SCNC: 8.7 MG/DL (ref 8.6–10.5)
CHLORIDE SERPL-SCNC: 102 MMOL/L (ref 98–107)
CO2 SERPL-SCNC: 27 MMOL/L (ref 22–29)
CREAT SERPL-MCNC: 1.7 MG/DL (ref 0.76–1.27)
DEPRECATED RDW RBC AUTO: 45.5 FL (ref 37–54)
EOSINOPHIL # BLD AUTO: 0.5 10*3/MM3 (ref 0–0.4)
EOSINOPHIL NFR BLD AUTO: 5.8 % (ref 0.3–6.2)
ERYTHROCYTE [DISTWIDTH] IN BLOOD BY AUTOMATED COUNT: 14.4 % (ref 12.3–15.4)
GFR SERPL CREATININE-BSD FRML MDRD: 40 ML/MIN/1.73
GLUCOSE BLDC GLUCOMTR-MCNC: 135 MG/DL (ref 70–105)
GLUCOSE BLDC GLUCOMTR-MCNC: 137 MG/DL (ref 70–105)
GLUCOSE BLDC GLUCOMTR-MCNC: 141 MG/DL (ref 70–105)
GLUCOSE BLDC GLUCOMTR-MCNC: 184 MG/DL (ref 70–105)
GLUCOSE BLDC GLUCOMTR-MCNC: 197 MG/DL (ref 70–105)
GLUCOSE SERPL-MCNC: 217 MG/DL (ref 65–99)
HCT VFR BLD AUTO: 26.6 % (ref 37.5–51)
HGB BLD-MCNC: 8.8 G/DL (ref 13–17.7)
INR PPP: 1.08 (ref 0.93–1.1)
LYMPHOCYTES # BLD AUTO: 2.2 10*3/MM3 (ref 0.7–3.1)
LYMPHOCYTES NFR BLD AUTO: 27.9 % (ref 19.6–45.3)
MAGNESIUM SERPL-MCNC: 2 MG/DL (ref 1.6–2.4)
MCH RBC QN AUTO: 29.8 PG (ref 26.6–33)
MCHC RBC AUTO-ENTMCNC: 32.9 G/DL (ref 31.5–35.7)
MCV RBC AUTO: 90.6 FL (ref 79–97)
MONOCYTES # BLD AUTO: 0.8 10*3/MM3 (ref 0.1–0.9)
MONOCYTES NFR BLD AUTO: 10 % (ref 5–12)
NEUTROPHILS NFR BLD AUTO: 4.4 10*3/MM3 (ref 1.7–7)
NEUTROPHILS NFR BLD AUTO: 56 % (ref 42.7–76)
NRBC BLD AUTO-RTO: 0 /100 WBC (ref 0–0.2)
PLATELET # BLD AUTO: 289 10*3/MM3 (ref 140–450)
PMV BLD AUTO: 8.4 FL (ref 6–12)
POTASSIUM SERPL-SCNC: 3.6 MMOL/L (ref 3.5–5.2)
PROTHROMBIN TIME: 11.8 SECONDS (ref 9.6–11.7)
RBC # BLD AUTO: 2.94 10*6/MM3 (ref 4.14–5.8)
SODIUM SERPL-SCNC: 141 MMOL/L (ref 136–145)
TSH SERPL DL<=0.05 MIU/L-ACNC: 3.38 UIU/ML (ref 0.27–4.2)
WBC # BLD AUTO: 7.8 10*3/MM3 (ref 3.4–10.8)

## 2021-05-23 PROCEDURE — 63710000001 INSULIN LISPRO (HUMAN) PER 5 UNITS: Performed by: INTERNAL MEDICINE

## 2021-05-23 PROCEDURE — 84443 ASSAY THYROID STIM HORMONE: CPT | Performed by: INTERNAL MEDICINE

## 2021-05-23 PROCEDURE — 63710000001 INSULIN ISOPHANE & REGULAR PER 5 UNITS: Performed by: NURSE PRACTITIONER

## 2021-05-23 PROCEDURE — 85610 PROTHROMBIN TIME: CPT | Performed by: INTERNAL MEDICINE

## 2021-05-23 PROCEDURE — 99233 SBSQ HOSP IP/OBS HIGH 50: CPT | Performed by: INTERNAL MEDICINE

## 2021-05-23 PROCEDURE — 85025 COMPLETE CBC W/AUTO DIFF WBC: CPT | Performed by: INTERNAL MEDICINE

## 2021-05-23 PROCEDURE — 25010000002 FUROSEMIDE PER 20 MG: Performed by: HOSPITALIST

## 2021-05-23 PROCEDURE — 63710000001 INSULIN ISOPHANE & REGULAR PER 5 UNITS: Performed by: HOSPITALIST

## 2021-05-23 PROCEDURE — 99232 SBSQ HOSP IP/OBS MODERATE 35: CPT | Performed by: HOSPITALIST

## 2021-05-23 PROCEDURE — 80048 BASIC METABOLIC PNL TOTAL CA: CPT | Performed by: INTERNAL MEDICINE

## 2021-05-23 PROCEDURE — 94799 UNLISTED PULMONARY SVC/PX: CPT

## 2021-05-23 PROCEDURE — G0378 HOSPITAL OBSERVATION PER HR: HCPCS

## 2021-05-23 PROCEDURE — 63710000001 INSULIN LISPRO (HUMAN) PER 5 UNITS: Performed by: HOSPITALIST

## 2021-05-23 PROCEDURE — 83735 ASSAY OF MAGNESIUM: CPT | Performed by: INTERNAL MEDICINE

## 2021-05-23 PROCEDURE — 82962 GLUCOSE BLOOD TEST: CPT

## 2021-05-23 RX ORDER — FUROSEMIDE 40 MG/1
40 TABLET ORAL
Status: DISCONTINUED | OUTPATIENT
Start: 2021-05-23 | End: 2021-05-23

## 2021-05-23 RX ORDER — POTASSIUM CHLORIDE 20 MEQ/1
20 TABLET, EXTENDED RELEASE ORAL ONCE
Status: COMPLETED | OUTPATIENT
Start: 2021-05-23 | End: 2021-05-23

## 2021-05-23 RX ORDER — FUROSEMIDE 40 MG/1
40 TABLET ORAL ONCE
Status: COMPLETED | OUTPATIENT
Start: 2021-05-23 | End: 2021-05-23

## 2021-05-23 RX ORDER — SODIUM CHLORIDE 9 MG/ML
50 INJECTION, SOLUTION INTRAVENOUS CONTINUOUS
Status: DISCONTINUED | OUTPATIENT
Start: 2021-05-24 | End: 2021-05-25

## 2021-05-23 RX ORDER — FUROSEMIDE 40 MG/1
40 TABLET ORAL
Status: DISCONTINUED | OUTPATIENT
Start: 2021-05-24 | End: 2021-05-26

## 2021-05-23 RX ADMIN — DONEPEZIL HYDROCHLORIDE 10 MG: 5 TABLET, FILM COATED ORAL at 20:10

## 2021-05-23 RX ADMIN — SODIUM CHLORIDE 50 ML/HR: 9 INJECTION, SOLUTION INTRAVENOUS at 23:57

## 2021-05-23 RX ADMIN — IPRATROPIUM BROMIDE 0.5 MG: 0.5 SOLUTION RESPIRATORY (INHALATION) at 11:28

## 2021-05-23 RX ADMIN — POTASSIUM CHLORIDE 20 MEQ: 1500 TABLET, EXTENDED RELEASE ORAL at 08:07

## 2021-05-23 RX ADMIN — GABAPENTIN 200 MG: 100 CAPSULE ORAL at 20:10

## 2021-05-23 RX ADMIN — INSULIN HUMAN 30 UNITS: 100 INJECTION, SUSPENSION SUBCUTANEOUS at 19:08

## 2021-05-23 RX ADMIN — ATORVASTATIN CALCIUM 40 MG: 40 TABLET, FILM COATED ORAL at 20:10

## 2021-05-23 RX ADMIN — DIGOXIN 125 MCG: 0.12 TABLET ORAL at 12:00

## 2021-05-23 RX ADMIN — GABAPENTIN 200 MG: 100 CAPSULE ORAL at 11:03

## 2021-05-23 RX ADMIN — IPRATROPIUM BROMIDE 0.5 MG: 0.5 SOLUTION RESPIRATORY (INHALATION) at 06:30

## 2021-05-23 RX ADMIN — FUROSEMIDE 40 MG: 40 TABLET ORAL at 19:09

## 2021-05-23 RX ADMIN — Medication 3 ML: at 08:15

## 2021-05-23 RX ADMIN — Medication 600 MG: at 20:10

## 2021-05-23 RX ADMIN — ISOSORBIDE MONONITRATE 60 MG: 60 TABLET, EXTENDED RELEASE ORAL at 20:10

## 2021-05-23 RX ADMIN — IPRATROPIUM BROMIDE 0.5 MG: 0.5 SOLUTION RESPIRATORY (INHALATION) at 15:07

## 2021-05-23 RX ADMIN — FUROSEMIDE 40 MG: 10 INJECTION, SOLUTION INTRAMUSCULAR; INTRAVENOUS at 05:26

## 2021-05-23 RX ADMIN — Medication 3 ML: at 20:11

## 2021-05-23 RX ADMIN — Medication 1000 UNITS: at 11:03

## 2021-05-23 RX ADMIN — HYDRALAZINE HYDROCHLORIDE 50 MG: 25 TABLET, FILM COATED ORAL at 23:57

## 2021-05-23 RX ADMIN — INSULIN HUMAN 30 UNITS: 100 INJECTION, SUSPENSION SUBCUTANEOUS at 08:13

## 2021-05-23 RX ADMIN — ATENOLOL 100 MG: 50 TABLET ORAL at 08:07

## 2021-05-23 RX ADMIN — Medication 600 MG: at 08:07

## 2021-05-23 RX ADMIN — ISOSORBIDE MONONITRATE 60 MG: 60 TABLET, EXTENDED RELEASE ORAL at 08:07

## 2021-05-23 RX ADMIN — IPRATROPIUM BROMIDE 0.5 MG: 0.5 SOLUTION RESPIRATORY (INHALATION) at 20:00

## 2021-05-23 RX ADMIN — CYANOCOBALAMIN TAB 1000 MCG 1000 MCG: 1000 TAB at 11:03

## 2021-05-23 RX ADMIN — ASPIRIN 81 MG CHEWABLE TABLET 81 MG: 81 TABLET CHEWABLE at 11:03

## 2021-05-23 RX ADMIN — DULOXETINE 60 MG: 30 CAPSULE, DELAYED RELEASE ORAL at 11:03

## 2021-05-23 RX ADMIN — INSULIN LISPRO 3 UNITS: 100 INJECTION, SOLUTION INTRAVENOUS; SUBCUTANEOUS at 08:13

## 2021-05-24 PROBLEM — R94.39 ABNORMAL NUCLEAR STRESS TEST: Status: ACTIVE | Noted: 2021-05-18

## 2021-05-24 PROBLEM — D89.831 CYTOKINE RELEASE SYNDROME, GRADE 1: Status: ACTIVE | Noted: 2021-05-24

## 2021-05-24 LAB
ACT BLD: 169 SECONDS (ref 89–137)
ACT BLD: 175 SECONDS (ref 89–137)
ANION GAP SERPL CALCULATED.3IONS-SCNC: 10 MMOL/L (ref 5–15)
BASOPHILS # BLD AUTO: 0.1 10*3/MM3 (ref 0–0.2)
BASOPHILS NFR BLD AUTO: 1.3 % (ref 0–1.5)
BUN SERPL-MCNC: 44 MG/DL (ref 8–23)
BUN/CREAT SERPL: 23.5 (ref 7–25)
CALCIUM SPEC-SCNC: 8.8 MG/DL (ref 8.6–10.5)
CHLORIDE SERPL-SCNC: 102 MMOL/L (ref 98–107)
CO2 SERPL-SCNC: 29 MMOL/L (ref 22–29)
CREAT SERPL-MCNC: 1.87 MG/DL (ref 0.76–1.27)
DEPRECATED RDW RBC AUTO: 47.7 FL (ref 37–54)
EOSINOPHIL # BLD AUTO: 0.5 10*3/MM3 (ref 0–0.4)
EOSINOPHIL NFR BLD AUTO: 7.2 % (ref 0.3–6.2)
ERYTHROCYTE [DISTWIDTH] IN BLOOD BY AUTOMATED COUNT: 14.9 % (ref 12.3–15.4)
GFR SERPL CREATININE-BSD FRML MDRD: 36 ML/MIN/1.73
GLUCOSE BLDC GLUCOMTR-MCNC: 160 MG/DL (ref 70–105)
GLUCOSE BLDC GLUCOMTR-MCNC: 213 MG/DL (ref 70–105)
GLUCOSE BLDC GLUCOMTR-MCNC: 50 MG/DL (ref 70–105)
GLUCOSE BLDC GLUCOMTR-MCNC: 68 MG/DL (ref 70–105)
GLUCOSE BLDC GLUCOMTR-MCNC: 77 MG/DL (ref 70–105)
GLUCOSE SERPL-MCNC: 150 MG/DL (ref 65–99)
HCT VFR BLD AUTO: 28.2 % (ref 37.5–51)
HGB BLD-MCNC: 9.1 G/DL (ref 13–17.7)
LYMPHOCYTES # BLD AUTO: 1.8 10*3/MM3 (ref 0.7–3.1)
LYMPHOCYTES NFR BLD AUTO: 26.7 % (ref 19.6–45.3)
MCH RBC QN AUTO: 29.6 PG (ref 26.6–33)
MCHC RBC AUTO-ENTMCNC: 32.4 G/DL (ref 31.5–35.7)
MCV RBC AUTO: 91.6 FL (ref 79–97)
MONOCYTES # BLD AUTO: 0.9 10*3/MM3 (ref 0.1–0.9)
MONOCYTES NFR BLD AUTO: 13.3 % (ref 5–12)
NEUTROPHILS NFR BLD AUTO: 3.5 10*3/MM3 (ref 1.7–7)
NEUTROPHILS NFR BLD AUTO: 51.5 % (ref 42.7–76)
NRBC BLD AUTO-RTO: 0.1 /100 WBC (ref 0–0.2)
PLATELET # BLD AUTO: 270 10*3/MM3 (ref 140–450)
PMV BLD AUTO: 7.9 FL (ref 6–12)
POTASSIUM SERPL-SCNC: 3.9 MMOL/L (ref 3.5–5.2)
RBC # BLD AUTO: 3.07 10*6/MM3 (ref 4.14–5.8)
SODIUM SERPL-SCNC: 141 MMOL/L (ref 136–145)
WBC # BLD AUTO: 6.9 10*3/MM3 (ref 3.4–10.8)

## 2021-05-24 PROCEDURE — 63710000001 INSULIN ISOPHANE & REGULAR PER 5 UNITS: Performed by: NURSE PRACTITIONER

## 2021-05-24 PROCEDURE — C1769 GUIDE WIRE: HCPCS | Performed by: INTERNAL MEDICINE

## 2021-05-24 PROCEDURE — 92928 PRQ TCAT PLMT NTRAC ST 1 LES: CPT | Performed by: INTERNAL MEDICINE

## 2021-05-24 PROCEDURE — 63710000001 INSULIN LISPRO (HUMAN) PER 5 UNITS: Performed by: HOSPITALIST

## 2021-05-24 PROCEDURE — 85025 COMPLETE CBC W/AUTO DIFF WBC: CPT | Performed by: INTERNAL MEDICINE

## 2021-05-24 PROCEDURE — 25010000002 MIDAZOLAM PER 1 MG: Performed by: INTERNAL MEDICINE

## 2021-05-24 PROCEDURE — 82962 GLUCOSE BLOOD TEST: CPT

## 2021-05-24 PROCEDURE — C1887 CATHETER, GUIDING: HCPCS | Performed by: INTERNAL MEDICINE

## 2021-05-24 PROCEDURE — 027034Z DILATION OF CORONARY ARTERY, ONE ARTERY WITH DRUG-ELUTING INTRALUMINAL DEVICE, PERCUTANEOUS APPROACH: ICD-10-PCS | Performed by: INTERNAL MEDICINE

## 2021-05-24 PROCEDURE — 99153 MOD SED SAME PHYS/QHP EA: CPT | Performed by: INTERNAL MEDICINE

## 2021-05-24 PROCEDURE — 25010000002 HEPARIN (PORCINE) PER 1000 UNITS: Performed by: INTERNAL MEDICINE

## 2021-05-24 PROCEDURE — 97530 THERAPEUTIC ACTIVITIES: CPT

## 2021-05-24 PROCEDURE — C9600 PERC DRUG-EL COR STENT SING: HCPCS | Performed by: INTERNAL MEDICINE

## 2021-05-24 PROCEDURE — B2131ZZ FLUOROSCOPY OF MULTIPLE CORONARY ARTERY BYPASS GRAFTS USING LOW OSMOLAR CONTRAST: ICD-10-PCS | Performed by: INTERNAL MEDICINE

## 2021-05-24 PROCEDURE — 99152 MOD SED SAME PHYS/QHP 5/>YRS: CPT | Performed by: INTERNAL MEDICINE

## 2021-05-24 PROCEDURE — 63710000001 INSULIN ISOPHANE & REGULAR PER 5 UNITS: Performed by: HOSPITALIST

## 2021-05-24 PROCEDURE — 5A1223Z PERFORMANCE OF CARDIAC PACING, CONTINUOUS: ICD-10-PCS | Performed by: INTERNAL MEDICINE

## 2021-05-24 PROCEDURE — 94799 UNLISTED PULMONARY SVC/PX: CPT

## 2021-05-24 PROCEDURE — 0 IOPAMIDOL PER 1 ML: Performed by: INTERNAL MEDICINE

## 2021-05-24 PROCEDURE — 93455 CORONARY ART/GRFT ANGIO S&I: CPT | Performed by: INTERNAL MEDICINE

## 2021-05-24 PROCEDURE — 80048 BASIC METABOLIC PNL TOTAL CA: CPT | Performed by: INTERNAL MEDICINE

## 2021-05-24 PROCEDURE — 99232 SBSQ HOSP IP/OBS MODERATE 35: CPT | Performed by: HOSPITALIST

## 2021-05-24 PROCEDURE — 99233 SBSQ HOSP IP/OBS HIGH 50: CPT | Performed by: INTERNAL MEDICINE

## 2021-05-24 PROCEDURE — C1874 STENT, COATED/COV W/DEL SYS: HCPCS | Performed by: INTERNAL MEDICINE

## 2021-05-24 PROCEDURE — 25010000002 FENTANYL CITRATE (PF) 100 MCG/2ML SOLUTION: Performed by: INTERNAL MEDICINE

## 2021-05-24 PROCEDURE — 4A023N7 MEASUREMENT OF CARDIAC SAMPLING AND PRESSURE, LEFT HEART, PERCUTANEOUS APPROACH: ICD-10-PCS | Performed by: INTERNAL MEDICINE

## 2021-05-24 PROCEDURE — C1894 INTRO/SHEATH, NON-LASER: HCPCS | Performed by: INTERNAL MEDICINE

## 2021-05-24 PROCEDURE — 63710000001 INSULIN LISPRO (HUMAN) PER 5 UNITS: Performed by: INTERNAL MEDICINE

## 2021-05-24 PROCEDURE — C1725 CATH, TRANSLUMIN NON-LASER: HCPCS | Performed by: INTERNAL MEDICINE

## 2021-05-24 PROCEDURE — B2151ZZ FLUOROSCOPY OF LEFT HEART USING LOW OSMOLAR CONTRAST: ICD-10-PCS | Performed by: INTERNAL MEDICINE

## 2021-05-24 PROCEDURE — 85347 COAGULATION TIME ACTIVATED: CPT

## 2021-05-24 DEVICE — XIENCE SIERRA™ EVEROLIMUS ELUTING CORONARY STENT SYSTEM 2.50 MM X 38 MM / RAPID-EXCHANGE
Type: IMPLANTABLE DEVICE | Status: FUNCTIONAL
Brand: XIENCE SIERRA™

## 2021-05-24 RX ORDER — LIDOCAINE HYDROCHLORIDE 20 MG/ML
INJECTION, SOLUTION INFILTRATION; PERINEURAL AS NEEDED
Status: DISCONTINUED | OUTPATIENT
Start: 2021-05-24 | End: 2021-05-24 | Stop reason: HOSPADM

## 2021-05-24 RX ORDER — SODIUM CHLORIDE 9 MG/ML
75 INJECTION, SOLUTION INTRAVENOUS CONTINUOUS
Status: DISCONTINUED | OUTPATIENT
Start: 2021-05-24 | End: 2021-05-25

## 2021-05-24 RX ORDER — HEPARIN SODIUM 1000 [USP'U]/ML
INJECTION, SOLUTION INTRAVENOUS; SUBCUTANEOUS AS NEEDED
Status: DISCONTINUED | OUTPATIENT
Start: 2021-05-24 | End: 2021-05-24 | Stop reason: HOSPADM

## 2021-05-24 RX ORDER — HYDROCODONE BITARTRATE AND ACETAMINOPHEN 7.5; 325 MG/1; MG/1
1 TABLET ORAL EVERY 12 HOURS PRN
Qty: 10 TABLET | Refills: 0 | Status: ON HOLD | OUTPATIENT
Start: 2021-05-24 | End: 2021-07-02 | Stop reason: SDUPTHER

## 2021-05-24 RX ORDER — FENTANYL CITRATE 50 UG/ML
INJECTION, SOLUTION INTRAMUSCULAR; INTRAVENOUS AS NEEDED
Status: DISCONTINUED | OUTPATIENT
Start: 2021-05-24 | End: 2021-05-24 | Stop reason: HOSPADM

## 2021-05-24 RX ORDER — ACETAMINOPHEN 325 MG/1
650 TABLET ORAL EVERY 4 HOURS PRN
Status: DISCONTINUED | OUTPATIENT
Start: 2021-05-24 | End: 2021-06-03 | Stop reason: HOSPADM

## 2021-05-24 RX ORDER — CLOPIDOGREL BISULFATE 75 MG/1
TABLET ORAL AS NEEDED
Status: DISCONTINUED | OUTPATIENT
Start: 2021-05-24 | End: 2021-05-24 | Stop reason: HOSPADM

## 2021-05-24 RX ORDER — SODIUM CHLORIDE 9 MG/ML
250 INJECTION, SOLUTION INTRAVENOUS ONCE AS NEEDED
Status: DISCONTINUED | OUTPATIENT
Start: 2021-05-24 | End: 2021-06-03 | Stop reason: HOSPADM

## 2021-05-24 RX ORDER — SODIUM CHLORIDE 9 MG/ML
100 INJECTION, SOLUTION INTRAVENOUS CONTINUOUS
Status: DISCONTINUED | OUTPATIENT
Start: 2021-05-24 | End: 2021-05-25

## 2021-05-24 RX ORDER — MIDAZOLAM HYDROCHLORIDE 1 MG/ML
INJECTION INTRAMUSCULAR; INTRAVENOUS AS NEEDED
Status: DISCONTINUED | OUTPATIENT
Start: 2021-05-24 | End: 2021-05-24 | Stop reason: HOSPADM

## 2021-05-24 RX ORDER — CLOPIDOGREL BISULFATE 75 MG/1
75 TABLET ORAL DAILY
Status: DISCONTINUED | OUTPATIENT
Start: 2021-05-25 | End: 2021-06-03 | Stop reason: HOSPADM

## 2021-05-24 RX ADMIN — ASPIRIN 81 MG CHEWABLE TABLET 81 MG: 81 TABLET CHEWABLE at 08:46

## 2021-05-24 RX ADMIN — ATORVASTATIN CALCIUM 40 MG: 40 TABLET, FILM COATED ORAL at 23:12

## 2021-05-24 RX ADMIN — SODIUM CHLORIDE 100 ML/HR: 9 INJECTION, SOLUTION INTRAVENOUS at 18:11

## 2021-05-24 RX ADMIN — GABAPENTIN 200 MG: 100 CAPSULE ORAL at 23:12

## 2021-05-24 RX ADMIN — IPRATROPIUM BROMIDE 0.5 MG: 0.5 SOLUTION RESPIRATORY (INHALATION) at 06:52

## 2021-05-24 RX ADMIN — INSULIN LISPRO 5 UNITS: 100 INJECTION, SOLUTION INTRAVENOUS; SUBCUTANEOUS at 13:05

## 2021-05-24 RX ADMIN — HYDROCODONE BITARTRATE AND ACETAMINOPHEN 1 TABLET: 7.5; 325 TABLET ORAL at 09:21

## 2021-05-24 RX ADMIN — CYANOCOBALAMIN TAB 1000 MCG 1000 MCG: 1000 TAB at 08:46

## 2021-05-24 RX ADMIN — Medication 3 ML: at 08:47

## 2021-05-24 RX ADMIN — Medication 600 MG: at 08:46

## 2021-05-24 RX ADMIN — INSULIN LISPRO 3 UNITS: 100 INJECTION, SOLUTION INTRAVENOUS; SUBCUTANEOUS at 08:47

## 2021-05-24 RX ADMIN — Medication 600 MG: at 23:12

## 2021-05-24 RX ADMIN — IPRATROPIUM BROMIDE 0.5 MG: 0.5 SOLUTION RESPIRATORY (INHALATION) at 11:41

## 2021-05-24 RX ADMIN — DONEPEZIL HYDROCHLORIDE 10 MG: 5 TABLET, FILM COATED ORAL at 23:12

## 2021-05-24 RX ADMIN — DIGOXIN 125 MCG: 0.12 TABLET ORAL at 13:12

## 2021-05-24 RX ADMIN — ATENOLOL 100 MG: 50 TABLET ORAL at 08:46

## 2021-05-24 RX ADMIN — DULOXETINE 60 MG: 30 CAPSULE, DELAYED RELEASE ORAL at 08:46

## 2021-05-24 RX ADMIN — IPRATROPIUM BROMIDE 0.5 MG: 0.5 SOLUTION RESPIRATORY (INHALATION) at 15:21

## 2021-05-24 RX ADMIN — DIGOXIN 125 MCG: 0.12 TABLET ORAL at 08:47

## 2021-05-24 RX ADMIN — INSULIN HUMAN 30 UNITS: 100 INJECTION, SUSPENSION SUBCUTANEOUS at 08:48

## 2021-05-24 RX ADMIN — GABAPENTIN 200 MG: 100 CAPSULE ORAL at 08:46

## 2021-05-24 RX ADMIN — Medication 1000 UNITS: at 08:46

## 2021-05-24 RX ADMIN — ISOSORBIDE MONONITRATE 60 MG: 60 TABLET, EXTENDED RELEASE ORAL at 08:46

## 2021-05-24 RX ADMIN — ISOSORBIDE MONONITRATE 60 MG: 60 TABLET, EXTENDED RELEASE ORAL at 23:12

## 2021-05-25 LAB
ANION GAP SERPL CALCULATED.3IONS-SCNC: 12 MMOL/L (ref 5–15)
ANION GAP SERPL CALCULATED.3IONS-SCNC: 9 MMOL/L (ref 5–15)
BASOPHILS # BLD AUTO: 0.1 10*3/MM3 (ref 0–0.2)
BASOPHILS NFR BLD AUTO: 1 % (ref 0–1.5)
BUN SERPL-MCNC: 46 MG/DL (ref 8–23)
BUN SERPL-MCNC: 48 MG/DL (ref 8–23)
BUN/CREAT SERPL: 24.7 (ref 7–25)
BUN/CREAT SERPL: 26.8 (ref 7–25)
CALCIUM SPEC-SCNC: 8.4 MG/DL (ref 8.6–10.5)
CALCIUM SPEC-SCNC: 8.4 MG/DL (ref 8.6–10.5)
CHLORIDE SERPL-SCNC: 103 MMOL/L (ref 98–107)
CHLORIDE SERPL-SCNC: 103 MMOL/L (ref 98–107)
CO2 SERPL-SCNC: 27 MMOL/L (ref 22–29)
CO2 SERPL-SCNC: 29 MMOL/L (ref 22–29)
CREAT SERPL-MCNC: 1.79 MG/DL (ref 0.76–1.27)
CREAT SERPL-MCNC: 1.86 MG/DL (ref 0.76–1.27)
DEPRECATED RDW RBC AUTO: 46.4 FL (ref 37–54)
EOSINOPHIL # BLD AUTO: 0.5 10*3/MM3 (ref 0–0.4)
EOSINOPHIL NFR BLD AUTO: 6.4 % (ref 0.3–6.2)
ERYTHROCYTE [DISTWIDTH] IN BLOOD BY AUTOMATED COUNT: 14.6 % (ref 12.3–15.4)
GFR SERPL CREATININE-BSD FRML MDRD: 36 ML/MIN/1.73
GFR SERPL CREATININE-BSD FRML MDRD: 38 ML/MIN/1.73
GLUCOSE BLDC GLUCOMTR-MCNC: 190 MG/DL (ref 70–105)
GLUCOSE BLDC GLUCOMTR-MCNC: 198 MG/DL (ref 70–105)
GLUCOSE BLDC GLUCOMTR-MCNC: 93 MG/DL (ref 70–105)
GLUCOSE BLDC GLUCOMTR-MCNC: 99 MG/DL (ref 70–105)
GLUCOSE SERPL-MCNC: 139 MG/DL (ref 65–99)
GLUCOSE SERPL-MCNC: 95 MG/DL (ref 65–99)
HCT VFR BLD AUTO: 26.3 % (ref 37.5–51)
HGB BLD-MCNC: 8.8 G/DL (ref 13–17.7)
LYMPHOCYTES # BLD AUTO: 1.7 10*3/MM3 (ref 0.7–3.1)
LYMPHOCYTES NFR BLD AUTO: 22.1 % (ref 19.6–45.3)
MCH RBC QN AUTO: 30.5 PG (ref 26.6–33)
MCHC RBC AUTO-ENTMCNC: 33.6 G/DL (ref 31.5–35.7)
MCV RBC AUTO: 91 FL (ref 79–97)
MONOCYTES # BLD AUTO: 0.9 10*3/MM3 (ref 0.1–0.9)
MONOCYTES NFR BLD AUTO: 11.6 % (ref 5–12)
NEUTROPHILS NFR BLD AUTO: 4.5 10*3/MM3 (ref 1.7–7)
NEUTROPHILS NFR BLD AUTO: 58.9 % (ref 42.7–76)
NRBC BLD AUTO-RTO: 0 /100 WBC (ref 0–0.2)
PLATELET # BLD AUTO: 258 10*3/MM3 (ref 140–450)
PMV BLD AUTO: 7.9 FL (ref 6–12)
POTASSIUM SERPL-SCNC: 4.1 MMOL/L (ref 3.5–5.2)
POTASSIUM SERPL-SCNC: 4.1 MMOL/L (ref 3.5–5.2)
RBC # BLD AUTO: 2.89 10*6/MM3 (ref 4.14–5.8)
SODIUM SERPL-SCNC: 141 MMOL/L (ref 136–145)
SODIUM SERPL-SCNC: 142 MMOL/L (ref 136–145)
WBC # BLD AUTO: 7.6 10*3/MM3 (ref 3.4–10.8)

## 2021-05-25 PROCEDURE — 94799 UNLISTED PULMONARY SVC/PX: CPT

## 2021-05-25 PROCEDURE — 63710000001 INSULIN ISOPHANE & REGULAR PER 5 UNITS: Performed by: INTERNAL MEDICINE

## 2021-05-25 PROCEDURE — 82962 GLUCOSE BLOOD TEST: CPT

## 2021-05-25 PROCEDURE — 63710000001 INSULIN LISPRO (HUMAN) PER 5 UNITS: Performed by: INTERNAL MEDICINE

## 2021-05-25 PROCEDURE — 99233 SBSQ HOSP IP/OBS HIGH 50: CPT | Performed by: INTERNAL MEDICINE

## 2021-05-25 PROCEDURE — 94760 N-INVAS EAR/PLS OXIMETRY 1: CPT

## 2021-05-25 PROCEDURE — 99232 SBSQ HOSP IP/OBS MODERATE 35: CPT | Performed by: HOSPITALIST

## 2021-05-25 PROCEDURE — 85025 COMPLETE CBC W/AUTO DIFF WBC: CPT | Performed by: INTERNAL MEDICINE

## 2021-05-25 PROCEDURE — 80048 BASIC METABOLIC PNL TOTAL CA: CPT | Performed by: INTERNAL MEDICINE

## 2021-05-25 RX ORDER — MIDAZOLAM HYDROCHLORIDE 1 MG/ML
1 INJECTION INTRAMUSCULAR; INTRAVENOUS ONCE
Status: CANCELLED | OUTPATIENT
Start: 2021-05-25 | End: 2021-05-25

## 2021-05-25 RX ORDER — SODIUM CHLORIDE 9 MG/ML
1-3 INJECTION, SOLUTION INTRAVENOUS CONTINUOUS
Status: CANCELLED | OUTPATIENT
Start: 2021-05-25

## 2021-05-25 RX ORDER — FENTANYL CITRATE 50 UG/ML
25 INJECTION, SOLUTION INTRAMUSCULAR; INTRAVENOUS ONCE
Status: CANCELLED | OUTPATIENT
Start: 2021-05-25 | End: 2021-05-25

## 2021-05-25 RX ADMIN — INSULIN HUMAN 30 UNITS: 100 INJECTION, SUSPENSION SUBCUTANEOUS at 17:33

## 2021-05-25 RX ADMIN — Medication 600 MG: at 20:14

## 2021-05-25 RX ADMIN — DONEPEZIL HYDROCHLORIDE 10 MG: 5 TABLET, FILM COATED ORAL at 20:14

## 2021-05-25 RX ADMIN — DULOXETINE 60 MG: 30 CAPSULE, DELAYED RELEASE ORAL at 09:05

## 2021-05-25 RX ADMIN — Medication 600 MG: at 12:15

## 2021-05-25 RX ADMIN — ATENOLOL 100 MG: 50 TABLET ORAL at 09:05

## 2021-05-25 RX ADMIN — Medication 1000 UNITS: at 09:05

## 2021-05-25 RX ADMIN — CLOPIDOGREL BISULFATE 75 MG: 75 TABLET ORAL at 09:04

## 2021-05-25 RX ADMIN — ASPIRIN 81 MG CHEWABLE TABLET 81 MG: 81 TABLET CHEWABLE at 09:05

## 2021-05-25 RX ADMIN — INSULIN LISPRO 3 UNITS: 100 INJECTION, SOLUTION INTRAVENOUS; SUBCUTANEOUS at 12:14

## 2021-05-25 RX ADMIN — ISOSORBIDE MONONITRATE 60 MG: 60 TABLET, EXTENDED RELEASE ORAL at 20:14

## 2021-05-25 RX ADMIN — ATORVASTATIN CALCIUM 40 MG: 40 TABLET, FILM COATED ORAL at 20:13

## 2021-05-25 RX ADMIN — ISOSORBIDE MONONITRATE 60 MG: 60 TABLET, EXTENDED RELEASE ORAL at 09:05

## 2021-05-25 RX ADMIN — Medication 3 ML: at 09:28

## 2021-05-25 RX ADMIN — FUROSEMIDE 40 MG: 40 TABLET ORAL at 17:33

## 2021-05-25 RX ADMIN — GABAPENTIN 200 MG: 100 CAPSULE ORAL at 09:05

## 2021-05-25 RX ADMIN — Medication 3 ML: at 20:14

## 2021-05-25 RX ADMIN — GABAPENTIN 200 MG: 100 CAPSULE ORAL at 20:14

## 2021-05-25 RX ADMIN — CYANOCOBALAMIN TAB 1000 MCG 1000 MCG: 1000 TAB at 09:04

## 2021-05-25 RX ADMIN — INSULIN LISPRO 3 UNITS: 100 INJECTION, SOLUTION INTRAVENOUS; SUBCUTANEOUS at 17:33

## 2021-05-25 RX ADMIN — FUROSEMIDE 40 MG: 40 TABLET ORAL at 09:04

## 2021-05-25 RX ADMIN — IPRATROPIUM BROMIDE 0.5 MG: 0.5 SOLUTION RESPIRATORY (INHALATION) at 19:04

## 2021-05-25 RX ADMIN — INSULIN HUMAN 30 UNITS: 100 INJECTION, SUSPENSION SUBCUTANEOUS at 09:31

## 2021-05-25 RX ADMIN — IPRATROPIUM BROMIDE 0.5 MG: 0.5 SOLUTION RESPIRATORY (INHALATION) at 15:00

## 2021-05-25 RX ADMIN — IPRATROPIUM BROMIDE 0.5 MG: 0.5 SOLUTION RESPIRATORY (INHALATION) at 10:53

## 2021-05-26 PROBLEM — I20.0 UNSTABLE ANGINA: Status: ACTIVE | Noted: 2021-05-26

## 2021-05-26 LAB
ACT BLD: 142 SECONDS (ref 89–137)
ANION GAP SERPL CALCULATED.3IONS-SCNC: 11 MMOL/L (ref 5–15)
BASOPHILS # BLD AUTO: 0.1 10*3/MM3 (ref 0–0.2)
BASOPHILS NFR BLD AUTO: 1.3 % (ref 0–1.5)
BUN SERPL-MCNC: 48 MG/DL (ref 8–23)
BUN/CREAT SERPL: 26.1 (ref 7–25)
CALCIUM SPEC-SCNC: 8.2 MG/DL (ref 8.6–10.5)
CHLORIDE SERPL-SCNC: 104 MMOL/L (ref 98–107)
CO2 SERPL-SCNC: 27 MMOL/L (ref 22–29)
CREAT SERPL-MCNC: 1.84 MG/DL (ref 0.76–1.27)
DEPRECATED RDW RBC AUTO: 47.3 FL (ref 37–54)
EOSINOPHIL # BLD AUTO: 0.5 10*3/MM3 (ref 0–0.4)
EOSINOPHIL NFR BLD AUTO: 8.8 % (ref 0.3–6.2)
ERYTHROCYTE [DISTWIDTH] IN BLOOD BY AUTOMATED COUNT: 14.7 % (ref 12.3–15.4)
GFR SERPL CREATININE-BSD FRML MDRD: 37 ML/MIN/1.73
GLUCOSE BLDC GLUCOMTR-MCNC: 103 MG/DL (ref 70–105)
GLUCOSE BLDC GLUCOMTR-MCNC: 106 MG/DL (ref 70–105)
GLUCOSE BLDC GLUCOMTR-MCNC: 143 MG/DL (ref 70–105)
GLUCOSE BLDC GLUCOMTR-MCNC: 206 MG/DL (ref 70–105)
GLUCOSE BLDC GLUCOMTR-MCNC: 79 MG/DL (ref 70–105)
GLUCOSE SERPL-MCNC: 78 MG/DL (ref 65–99)
HCT VFR BLD AUTO: 25.8 % (ref 37.5–51)
HGB BLD-MCNC: 8.5 G/DL (ref 13–17.7)
LYMPHOCYTES # BLD AUTO: 2.1 10*3/MM3 (ref 0.7–3.1)
LYMPHOCYTES NFR BLD AUTO: 34.4 % (ref 19.6–45.3)
MAGNESIUM SERPL-MCNC: 2.2 MG/DL (ref 1.6–2.4)
MCH RBC QN AUTO: 30 PG (ref 26.6–33)
MCHC RBC AUTO-ENTMCNC: 32.9 G/DL (ref 31.5–35.7)
MCV RBC AUTO: 91.1 FL (ref 79–97)
MONOCYTES # BLD AUTO: 0.9 10*3/MM3 (ref 0.1–0.9)
MONOCYTES NFR BLD AUTO: 14.1 % (ref 5–12)
NEUTROPHILS NFR BLD AUTO: 2.5 10*3/MM3 (ref 1.7–7)
NEUTROPHILS NFR BLD AUTO: 41.4 % (ref 42.7–76)
NRBC BLD AUTO-RTO: 0.2 /100 WBC (ref 0–0.2)
PLATELET # BLD AUTO: 258 10*3/MM3 (ref 140–450)
PMV BLD AUTO: 7.7 FL (ref 6–12)
POTASSIUM SERPL-SCNC: 4 MMOL/L (ref 3.5–5.2)
RBC # BLD AUTO: 2.83 10*6/MM3 (ref 4.14–5.8)
SODIUM SERPL-SCNC: 142 MMOL/L (ref 136–145)
TROPONIN T SERPL-MCNC: 0.19 NG/ML (ref 0–0.03)
WBC # BLD AUTO: 6.1 10*3/MM3 (ref 3.4–10.8)

## 2021-05-26 PROCEDURE — 94799 UNLISTED PULMONARY SVC/PX: CPT

## 2021-05-26 PROCEDURE — 80048 BASIC METABOLIC PNL TOTAL CA: CPT | Performed by: INTERNAL MEDICINE

## 2021-05-26 PROCEDURE — 0 IOPAMIDOL PER 1 ML: Performed by: INTERNAL MEDICINE

## 2021-05-26 PROCEDURE — C1894 INTRO/SHEATH, NON-LASER: HCPCS | Performed by: INTERNAL MEDICINE

## 2021-05-26 PROCEDURE — 97530 THERAPEUTIC ACTIVITIES: CPT

## 2021-05-26 PROCEDURE — 99152 MOD SED SAME PHYS/QHP 5/>YRS: CPT | Performed by: INTERNAL MEDICINE

## 2021-05-26 PROCEDURE — 85347 COAGULATION TIME ACTIVATED: CPT

## 2021-05-26 PROCEDURE — 97110 THERAPEUTIC EXERCISES: CPT

## 2021-05-26 PROCEDURE — 63710000001 INSULIN ISOPHANE & REGULAR PER 5 UNITS: Performed by: INTERNAL MEDICINE

## 2021-05-26 PROCEDURE — 25010000002 MIDAZOLAM PER 1 MG: Performed by: INTERNAL MEDICINE

## 2021-05-26 PROCEDURE — 82962 GLUCOSE BLOOD TEST: CPT

## 2021-05-26 PROCEDURE — 5A1223Z PERFORMANCE OF CARDIAC PACING, CONTINUOUS: ICD-10-PCS | Performed by: INTERNAL MEDICINE

## 2021-05-26 PROCEDURE — 97116 GAIT TRAINING THERAPY: CPT

## 2021-05-26 PROCEDURE — 93010 ELECTROCARDIOGRAM REPORT: CPT | Performed by: INTERNAL MEDICINE

## 2021-05-26 PROCEDURE — 93005 ELECTROCARDIOGRAM TRACING: CPT | Performed by: INTERNAL MEDICINE

## 2021-05-26 PROCEDURE — 25010000002 HEPARIN (PORCINE) PER 1000 UNITS: Performed by: INTERNAL MEDICINE

## 2021-05-26 PROCEDURE — C1769 GUIDE WIRE: HCPCS | Performed by: INTERNAL MEDICINE

## 2021-05-26 PROCEDURE — 25010000002 FENTANYL CITRATE (PF) 50 MCG/ML SOLUTION: Performed by: INTERNAL MEDICINE

## 2021-05-26 PROCEDURE — C9604 PERC D-E COR REVASC T CABG S: HCPCS | Performed by: INTERNAL MEDICINE

## 2021-05-26 PROCEDURE — 99232 SBSQ HOSP IP/OBS MODERATE 35: CPT | Performed by: HOSPITALIST

## 2021-05-26 PROCEDURE — C1874 STENT, COATED/COV W/DEL SYS: HCPCS | Performed by: INTERNAL MEDICINE

## 2021-05-26 PROCEDURE — 85025 COMPLETE CBC W/AUTO DIFF WBC: CPT | Performed by: INTERNAL MEDICINE

## 2021-05-26 PROCEDURE — 92937 PRQ TRLUML REVSC CAB GRF 1: CPT | Performed by: INTERNAL MEDICINE

## 2021-05-26 PROCEDURE — C1887 CATHETER, GUIDING: HCPCS | Performed by: INTERNAL MEDICINE

## 2021-05-26 PROCEDURE — 027034Z DILATION OF CORONARY ARTERY, ONE ARTERY WITH DRUG-ELUTING INTRALUMINAL DEVICE, PERCUTANEOUS APPROACH: ICD-10-PCS | Performed by: INTERNAL MEDICINE

## 2021-05-26 PROCEDURE — 84484 ASSAY OF TROPONIN QUANT: CPT | Performed by: INTERNAL MEDICINE

## 2021-05-26 PROCEDURE — 63710000001 INSULIN LISPRO (HUMAN) PER 5 UNITS: Performed by: INTERNAL MEDICINE

## 2021-05-26 PROCEDURE — 83735 ASSAY OF MAGNESIUM: CPT | Performed by: INTERNAL MEDICINE

## 2021-05-26 PROCEDURE — 99233 SBSQ HOSP IP/OBS HIGH 50: CPT | Performed by: INTERNAL MEDICINE

## 2021-05-26 DEVICE — XIENCE SIERRA™ EVEROLIMUS ELUTING CORONARY STENT SYSTEM 4.00 MM X 18 MM / RAPID-EXCHANGE
Type: IMPLANTABLE DEVICE | Status: FUNCTIONAL
Brand: XIENCE SIERRA™

## 2021-05-26 RX ORDER — MIDAZOLAM HYDROCHLORIDE 1 MG/ML
INJECTION INTRAMUSCULAR; INTRAVENOUS AS NEEDED
Status: DISCONTINUED | OUTPATIENT
Start: 2021-05-26 | End: 2021-05-26 | Stop reason: HOSPADM

## 2021-05-26 RX ORDER — SODIUM CHLORIDE 9 MG/ML
100 INJECTION, SOLUTION INTRAVENOUS CONTINUOUS
Status: DISCONTINUED | OUTPATIENT
Start: 2021-05-26 | End: 2021-05-26

## 2021-05-26 RX ORDER — HEPARIN SODIUM 1000 [USP'U]/ML
INJECTION, SOLUTION INTRAVENOUS; SUBCUTANEOUS AS NEEDED
Status: DISCONTINUED | OUTPATIENT
Start: 2021-05-26 | End: 2021-05-26 | Stop reason: HOSPADM

## 2021-05-26 RX ORDER — SODIUM CHLORIDE 9 MG/ML
250 INJECTION, SOLUTION INTRAVENOUS ONCE AS NEEDED
Status: DISCONTINUED | OUTPATIENT
Start: 2021-05-26 | End: 2021-06-03 | Stop reason: HOSPADM

## 2021-05-26 RX ORDER — SODIUM CHLORIDE 9 MG/ML
75 INJECTION, SOLUTION INTRAVENOUS CONTINUOUS
Status: DISCONTINUED | OUTPATIENT
Start: 2021-05-26 | End: 2021-05-27

## 2021-05-26 RX ORDER — LIDOCAINE HYDROCHLORIDE 20 MG/ML
INJECTION, SOLUTION INFILTRATION; PERINEURAL AS NEEDED
Status: DISCONTINUED | OUTPATIENT
Start: 2021-05-26 | End: 2021-05-26 | Stop reason: HOSPADM

## 2021-05-26 RX ORDER — FENTANYL CITRATE 50 UG/ML
INJECTION, SOLUTION INTRAMUSCULAR; INTRAVENOUS AS NEEDED
Status: DISCONTINUED | OUTPATIENT
Start: 2021-05-26 | End: 2021-05-26 | Stop reason: HOSPADM

## 2021-05-26 RX ORDER — SODIUM CHLORIDE 9 MG/ML
100 INJECTION, SOLUTION INTRAVENOUS CONTINUOUS
Status: DISPENSED | OUTPATIENT
Start: 2021-05-26 | End: 2021-05-26

## 2021-05-26 RX ADMIN — CYANOCOBALAMIN TAB 1000 MCG 1000 MCG: 1000 TAB at 08:22

## 2021-05-26 RX ADMIN — ASPIRIN 81 MG CHEWABLE TABLET 81 MG: 81 TABLET CHEWABLE at 08:22

## 2021-05-26 RX ADMIN — IPRATROPIUM BROMIDE 0.5 MG: 0.5 SOLUTION RESPIRATORY (INHALATION) at 18:47

## 2021-05-26 RX ADMIN — Medication 3 ML: at 08:24

## 2021-05-26 RX ADMIN — DULOXETINE 60 MG: 30 CAPSULE, DELAYED RELEASE ORAL at 08:22

## 2021-05-26 RX ADMIN — IPRATROPIUM BROMIDE 0.5 MG: 0.5 SOLUTION RESPIRATORY (INHALATION) at 08:35

## 2021-05-26 RX ADMIN — Medication 1000 UNITS: at 08:22

## 2021-05-26 RX ADMIN — CLOPIDOGREL BISULFATE 75 MG: 75 TABLET ORAL at 08:22

## 2021-05-26 RX ADMIN — ATENOLOL 100 MG: 50 TABLET ORAL at 08:22

## 2021-05-26 RX ADMIN — INSULIN LISPRO 5 UNITS: 100 INJECTION, SOLUTION INTRAVENOUS; SUBCUTANEOUS at 12:28

## 2021-05-26 RX ADMIN — Medication 600 MG: at 08:22

## 2021-05-26 RX ADMIN — INSULIN HUMAN 30 UNITS: 100 INJECTION, SUSPENSION SUBCUTANEOUS at 18:25

## 2021-05-26 RX ADMIN — SODIUM CHLORIDE 100 ML/HR: 9 INJECTION, SOLUTION INTRAVENOUS at 10:14

## 2021-05-26 RX ADMIN — IPRATROPIUM BROMIDE 0.5 MG: 0.5 SOLUTION RESPIRATORY (INHALATION) at 11:04

## 2021-05-26 RX ADMIN — GABAPENTIN 200 MG: 100 CAPSULE ORAL at 08:22

## 2021-05-26 RX ADMIN — ISOSORBIDE MONONITRATE 60 MG: 60 TABLET, EXTENDED RELEASE ORAL at 08:22

## 2021-05-26 RX ADMIN — SODIUM CHLORIDE 75 ML/HR: 9 INJECTION, SOLUTION INTRAVENOUS at 18:09

## 2021-05-26 RX ADMIN — FUROSEMIDE 40 MG: 40 TABLET ORAL at 08:22

## 2021-05-27 LAB
ANION GAP SERPL CALCULATED.3IONS-SCNC: 11 MMOL/L (ref 5–15)
BUN SERPL-MCNC: 45 MG/DL (ref 8–23)
BUN/CREAT SERPL: 26.8 (ref 7–25)
CALCIUM SPEC-SCNC: 8.4 MG/DL (ref 8.6–10.5)
CHLORIDE SERPL-SCNC: 104 MMOL/L (ref 98–107)
CO2 SERPL-SCNC: 26 MMOL/L (ref 22–29)
CREAT SERPL-MCNC: 1.68 MG/DL (ref 0.76–1.27)
GFR SERPL CREATININE-BSD FRML MDRD: 41 ML/MIN/1.73
GLUCOSE BLDC GLUCOMTR-MCNC: 115 MG/DL (ref 70–105)
GLUCOSE BLDC GLUCOMTR-MCNC: 190 MG/DL (ref 70–105)
GLUCOSE BLDC GLUCOMTR-MCNC: 78 MG/DL (ref 70–105)
GLUCOSE BLDC GLUCOMTR-MCNC: 93 MG/DL (ref 70–105)
GLUCOSE SERPL-MCNC: 177 MG/DL (ref 65–99)
POTASSIUM SERPL-SCNC: 4.3 MMOL/L (ref 3.5–5.2)
SODIUM SERPL-SCNC: 141 MMOL/L (ref 136–145)

## 2021-05-27 PROCEDURE — 80048 BASIC METABOLIC PNL TOTAL CA: CPT | Performed by: INTERNAL MEDICINE

## 2021-05-27 PROCEDURE — 82962 GLUCOSE BLOOD TEST: CPT

## 2021-05-27 PROCEDURE — 99232 SBSQ HOSP IP/OBS MODERATE 35: CPT | Performed by: HOSPITALIST

## 2021-05-27 PROCEDURE — 94799 UNLISTED PULMONARY SVC/PX: CPT

## 2021-05-27 PROCEDURE — 99233 SBSQ HOSP IP/OBS HIGH 50: CPT | Performed by: INTERNAL MEDICINE

## 2021-05-27 PROCEDURE — 63710000001 INSULIN LISPRO (HUMAN) PER 5 UNITS: Performed by: INTERNAL MEDICINE

## 2021-05-27 PROCEDURE — 63710000001 INSULIN ISOPHANE & REGULAR PER 5 UNITS: Performed by: INTERNAL MEDICINE

## 2021-05-27 PROCEDURE — 63710000001 INSULIN ISOPHANE & REGULAR PER 5 UNITS: Performed by: HOSPITALIST

## 2021-05-27 RX ORDER — ACETAMINOPHEN 325 MG/1
650 TABLET ORAL EVERY 4 HOURS PRN
Status: DISCONTINUED | OUTPATIENT
Start: 2021-05-27 | End: 2021-05-27 | Stop reason: SDUPTHER

## 2021-05-27 RX ADMIN — DIGOXIN 125 MCG: 0.12 TABLET ORAL at 11:36

## 2021-05-27 RX ADMIN — Medication 1000 UNITS: at 08:40

## 2021-05-27 RX ADMIN — CYANOCOBALAMIN TAB 1000 MCG 1000 MCG: 1000 TAB at 08:40

## 2021-05-27 RX ADMIN — CLOPIDOGREL BISULFATE 75 MG: 75 TABLET ORAL at 08:40

## 2021-05-27 RX ADMIN — DULOXETINE 60 MG: 30 CAPSULE, DELAYED RELEASE ORAL at 08:40

## 2021-05-27 RX ADMIN — Medication 3 ML: at 21:11

## 2021-05-27 RX ADMIN — ISOSORBIDE MONONITRATE 60 MG: 60 TABLET, EXTENDED RELEASE ORAL at 21:11

## 2021-05-27 RX ADMIN — ASPIRIN 81 MG CHEWABLE TABLET 81 MG: 81 TABLET CHEWABLE at 08:40

## 2021-05-27 RX ADMIN — IPRATROPIUM BROMIDE 0.5 MG: 0.5 SOLUTION RESPIRATORY (INHALATION) at 11:02

## 2021-05-27 RX ADMIN — ISOSORBIDE MONONITRATE 60 MG: 60 TABLET, EXTENDED RELEASE ORAL at 08:40

## 2021-05-27 RX ADMIN — APIXABAN 5 MG: 5 TABLET, FILM COATED ORAL at 09:15

## 2021-05-27 RX ADMIN — INSULIN HUMAN 30 UNITS: 100 INJECTION, SUSPENSION SUBCUTANEOUS at 17:52

## 2021-05-27 RX ADMIN — IPRATROPIUM BROMIDE 0.5 MG: 0.5 SOLUTION RESPIRATORY (INHALATION) at 18:32

## 2021-05-27 RX ADMIN — IPRATROPIUM BROMIDE 0.5 MG: 0.5 SOLUTION RESPIRATORY (INHALATION) at 07:03

## 2021-05-27 RX ADMIN — DONEPEZIL HYDROCHLORIDE 10 MG: 5 TABLET, FILM COATED ORAL at 21:11

## 2021-05-27 RX ADMIN — Medication 3 ML: at 08:40

## 2021-05-27 RX ADMIN — APIXABAN 5 MG: 5 TABLET, FILM COATED ORAL at 21:11

## 2021-05-27 RX ADMIN — IPRATROPIUM BROMIDE 0.5 MG: 0.5 SOLUTION RESPIRATORY (INHALATION) at 14:40

## 2021-05-27 RX ADMIN — ATORVASTATIN CALCIUM 40 MG: 40 TABLET, FILM COATED ORAL at 21:11

## 2021-05-27 RX ADMIN — INSULIN HUMAN 30 UNITS: 100 INJECTION, SUSPENSION SUBCUTANEOUS at 08:40

## 2021-05-27 RX ADMIN — GABAPENTIN 200 MG: 100 CAPSULE ORAL at 21:11

## 2021-05-27 RX ADMIN — INSULIN LISPRO 3 UNITS: 100 INJECTION, SOLUTION INTRAVENOUS; SUBCUTANEOUS at 11:36

## 2021-05-27 RX ADMIN — ATENOLOL 100 MG: 50 TABLET ORAL at 08:40

## 2021-05-27 RX ADMIN — GABAPENTIN 200 MG: 100 CAPSULE ORAL at 08:44

## 2021-05-28 LAB
ANION GAP SERPL CALCULATED.3IONS-SCNC: 12 MMOL/L (ref 5–15)
BASOPHILS # BLD AUTO: 0 10*3/MM3 (ref 0–0.2)
BASOPHILS NFR BLD AUTO: 0.2 % (ref 0–1.5)
BUN SERPL-MCNC: 46 MG/DL (ref 8–23)
BUN/CREAT SERPL: 29.5 (ref 7–25)
CALCIUM SPEC-SCNC: 8.3 MG/DL (ref 8.6–10.5)
CHLORIDE SERPL-SCNC: 105 MMOL/L (ref 98–107)
CO2 SERPL-SCNC: 26 MMOL/L (ref 22–29)
CREAT SERPL-MCNC: 1.56 MG/DL (ref 0.76–1.27)
DEPRECATED RDW RBC AUTO: 46.8 FL (ref 37–54)
EOSINOPHIL # BLD AUTO: 0.3 10*3/MM3 (ref 0–0.4)
EOSINOPHIL NFR BLD AUTO: 4.8 % (ref 0.3–6.2)
ERYTHROCYTE [DISTWIDTH] IN BLOOD BY AUTOMATED COUNT: 14.6 % (ref 12.3–15.4)
GFR SERPL CREATININE-BSD FRML MDRD: 44 ML/MIN/1.73
GLUCOSE BLDC GLUCOMTR-MCNC: 131 MG/DL (ref 70–105)
GLUCOSE BLDC GLUCOMTR-MCNC: 135 MG/DL (ref 70–105)
GLUCOSE BLDC GLUCOMTR-MCNC: 140 MG/DL (ref 70–105)
GLUCOSE BLDC GLUCOMTR-MCNC: 179 MG/DL (ref 70–105)
GLUCOSE BLDC GLUCOMTR-MCNC: 51 MG/DL (ref 70–105)
GLUCOSE BLDC GLUCOMTR-MCNC: 65 MG/DL (ref 70–105)
GLUCOSE BLDC GLUCOMTR-MCNC: 90 MG/DL (ref 70–105)
GLUCOSE SERPL-MCNC: 52 MG/DL (ref 65–99)
HCT VFR BLD AUTO: 25.4 % (ref 37.5–51)
HGB BLD-MCNC: 8.4 G/DL (ref 13–17.7)
LYMPHOCYTES # BLD AUTO: 2.3 10*3/MM3 (ref 0.7–3.1)
LYMPHOCYTES NFR BLD AUTO: 33.8 % (ref 19.6–45.3)
MCH RBC QN AUTO: 30.1 PG (ref 26.6–33)
MCHC RBC AUTO-ENTMCNC: 33.3 G/DL (ref 31.5–35.7)
MCV RBC AUTO: 90.6 FL (ref 79–97)
MONOCYTES # BLD AUTO: 1 10*3/MM3 (ref 0.1–0.9)
MONOCYTES NFR BLD AUTO: 15.1 % (ref 5–12)
NEUTROPHILS NFR BLD AUTO: 3.1 10*3/MM3 (ref 1.7–7)
NEUTROPHILS NFR BLD AUTO: 46.1 % (ref 42.7–76)
NRBC BLD AUTO-RTO: 0.2 /100 WBC (ref 0–0.2)
PLATELET # BLD AUTO: 241 10*3/MM3 (ref 140–450)
PMV BLD AUTO: 7.7 FL (ref 6–12)
POTASSIUM SERPL-SCNC: 4 MMOL/L (ref 3.5–5.2)
RBC # BLD AUTO: 2.8 10*6/MM3 (ref 4.14–5.8)
SODIUM SERPL-SCNC: 143 MMOL/L (ref 136–145)
WBC # BLD AUTO: 6.7 10*3/MM3 (ref 3.4–10.8)

## 2021-05-28 PROCEDURE — 97530 THERAPEUTIC ACTIVITIES: CPT

## 2021-05-28 PROCEDURE — 97535 SELF CARE MNGMENT TRAINING: CPT

## 2021-05-28 PROCEDURE — 97112 NEUROMUSCULAR REEDUCATION: CPT

## 2021-05-28 PROCEDURE — 94799 UNLISTED PULMONARY SVC/PX: CPT

## 2021-05-28 PROCEDURE — 82962 GLUCOSE BLOOD TEST: CPT

## 2021-05-28 PROCEDURE — 85025 COMPLETE CBC W/AUTO DIFF WBC: CPT | Performed by: HOSPITALIST

## 2021-05-28 PROCEDURE — 99232 SBSQ HOSP IP/OBS MODERATE 35: CPT | Performed by: INTERNAL MEDICINE

## 2021-05-28 PROCEDURE — 63710000001 INSULIN LISPRO (HUMAN) PER 5 UNITS: Performed by: HOSPITALIST

## 2021-05-28 PROCEDURE — 80048 BASIC METABOLIC PNL TOTAL CA: CPT | Performed by: HOSPITALIST

## 2021-05-28 PROCEDURE — 99231 SBSQ HOSP IP/OBS SF/LOW 25: CPT | Performed by: HOSPITALIST

## 2021-05-28 PROCEDURE — 63710000001 INSULIN ISOPHANE & REGULAR PER 5 UNITS: Performed by: HOSPITALIST

## 2021-05-28 RX ORDER — FUROSEMIDE 40 MG/1
40 TABLET ORAL
Status: DISCONTINUED | OUTPATIENT
Start: 2021-05-28 | End: 2021-05-31

## 2021-05-28 RX ADMIN — CLOPIDOGREL BISULFATE 75 MG: 75 TABLET ORAL at 09:23

## 2021-05-28 RX ADMIN — DONEPEZIL HYDROCHLORIDE 10 MG: 5 TABLET, FILM COATED ORAL at 20:36

## 2021-05-28 RX ADMIN — INSULIN LISPRO 3 UNITS: 100 INJECTION, SOLUTION INTRAVENOUS; SUBCUTANEOUS at 12:05

## 2021-05-28 RX ADMIN — ASPIRIN 81 MG CHEWABLE TABLET 81 MG: 81 TABLET CHEWABLE at 09:23

## 2021-05-28 RX ADMIN — Medication 3 ML: at 20:37

## 2021-05-28 RX ADMIN — IPRATROPIUM BROMIDE 0.5 MG: 0.5 SOLUTION RESPIRATORY (INHALATION) at 18:57

## 2021-05-28 RX ADMIN — INSULIN HUMAN 30 UNITS: 100 INJECTION, SUSPENSION SUBCUTANEOUS at 17:19

## 2021-05-28 RX ADMIN — IPRATROPIUM BROMIDE 0.5 MG: 0.5 SOLUTION RESPIRATORY (INHALATION) at 15:13

## 2021-05-28 RX ADMIN — APIXABAN 5 MG: 5 TABLET, FILM COATED ORAL at 09:23

## 2021-05-28 RX ADMIN — DEXTROSE MONOHYDRATE 25 G: 500 INJECTION PARENTERAL at 04:36

## 2021-05-28 RX ADMIN — FUROSEMIDE 40 MG: 40 TABLET ORAL at 17:19

## 2021-05-28 RX ADMIN — IPRATROPIUM BROMIDE 0.5 MG: 0.5 SOLUTION RESPIRATORY (INHALATION) at 06:50

## 2021-05-28 RX ADMIN — CYANOCOBALAMIN TAB 1000 MCG 1000 MCG: 1000 TAB at 09:23

## 2021-05-28 RX ADMIN — ATORVASTATIN CALCIUM 40 MG: 40 TABLET, FILM COATED ORAL at 20:36

## 2021-05-28 RX ADMIN — ATENOLOL 100 MG: 50 TABLET ORAL at 09:23

## 2021-05-28 RX ADMIN — GABAPENTIN 200 MG: 100 CAPSULE ORAL at 09:23

## 2021-05-28 RX ADMIN — INSULIN HUMAN 30 UNITS: 100 INJECTION, SUSPENSION SUBCUTANEOUS at 09:23

## 2021-05-28 RX ADMIN — GABAPENTIN 200 MG: 100 CAPSULE ORAL at 20:37

## 2021-05-28 RX ADMIN — ISOSORBIDE MONONITRATE 60 MG: 60 TABLET, EXTENDED RELEASE ORAL at 20:36

## 2021-05-28 RX ADMIN — Medication 1000 UNITS: at 09:23

## 2021-05-28 RX ADMIN — IPRATROPIUM BROMIDE 0.5 MG: 0.5 SOLUTION RESPIRATORY (INHALATION) at 11:35

## 2021-05-28 RX ADMIN — ISOSORBIDE MONONITRATE 60 MG: 60 TABLET, EXTENDED RELEASE ORAL at 09:23

## 2021-05-28 RX ADMIN — Medication 3 ML: at 09:21

## 2021-05-28 RX ADMIN — DULOXETINE 60 MG: 30 CAPSULE, DELAYED RELEASE ORAL at 09:23

## 2021-05-28 RX ADMIN — APIXABAN 5 MG: 5 TABLET, FILM COATED ORAL at 20:36

## 2021-05-28 RX ADMIN — FUROSEMIDE 40 MG: 40 TABLET ORAL at 12:05

## 2021-05-29 LAB
ANION GAP SERPL CALCULATED.3IONS-SCNC: 10 MMOL/L (ref 5–15)
BUN SERPL-MCNC: 44 MG/DL (ref 8–23)
BUN/CREAT SERPL: 28.2 (ref 7–25)
CALCIUM SPEC-SCNC: 8.1 MG/DL (ref 8.6–10.5)
CHLORIDE SERPL-SCNC: 106 MMOL/L (ref 98–107)
CO2 SERPL-SCNC: 26 MMOL/L (ref 22–29)
CREAT SERPL-MCNC: 1.56 MG/DL (ref 0.76–1.27)
GFR SERPL CREATININE-BSD FRML MDRD: 44 ML/MIN/1.73
GLUCOSE BLDC GLUCOMTR-MCNC: 101 MG/DL (ref 70–105)
GLUCOSE BLDC GLUCOMTR-MCNC: 102 MG/DL (ref 70–105)
GLUCOSE BLDC GLUCOMTR-MCNC: 197 MG/DL (ref 70–105)
GLUCOSE BLDC GLUCOMTR-MCNC: 244 MG/DL (ref 70–105)
GLUCOSE BLDC GLUCOMTR-MCNC: 88 MG/DL (ref 70–105)
GLUCOSE SERPL-MCNC: 55 MG/DL (ref 65–99)
POTASSIUM SERPL-SCNC: 4 MMOL/L (ref 3.5–5.2)
SODIUM SERPL-SCNC: 142 MMOL/L (ref 136–145)

## 2021-05-29 PROCEDURE — 97110 THERAPEUTIC EXERCISES: CPT

## 2021-05-29 PROCEDURE — 80048 BASIC METABOLIC PNL TOTAL CA: CPT | Performed by: INTERNAL MEDICINE

## 2021-05-29 PROCEDURE — 97530 THERAPEUTIC ACTIVITIES: CPT

## 2021-05-29 PROCEDURE — 82962 GLUCOSE BLOOD TEST: CPT

## 2021-05-29 PROCEDURE — 94799 UNLISTED PULMONARY SVC/PX: CPT

## 2021-05-29 PROCEDURE — 93010 ELECTROCARDIOGRAM REPORT: CPT | Performed by: INTERNAL MEDICINE

## 2021-05-29 PROCEDURE — 63710000001 INSULIN LISPRO (HUMAN) PER 5 UNITS: Performed by: HOSPITALIST

## 2021-05-29 PROCEDURE — 97112 NEUROMUSCULAR REEDUCATION: CPT

## 2021-05-29 PROCEDURE — 99231 SBSQ HOSP IP/OBS SF/LOW 25: CPT | Performed by: HOSPITALIST

## 2021-05-29 PROCEDURE — 97116 GAIT TRAINING THERAPY: CPT

## 2021-05-29 PROCEDURE — 93005 ELECTROCARDIOGRAM TRACING: CPT | Performed by: HOSPITALIST

## 2021-05-29 PROCEDURE — 99232 SBSQ HOSP IP/OBS MODERATE 35: CPT | Performed by: INTERNAL MEDICINE

## 2021-05-29 PROCEDURE — 63710000001 INSULIN ISOPHANE & REGULAR PER 5 UNITS: Performed by: HOSPITALIST

## 2021-05-29 RX ORDER — GUAIFENESIN/DEXTROMETHORPHAN 100-10MG/5
5 SYRUP ORAL EVERY 4 HOURS PRN
Status: DISCONTINUED | OUTPATIENT
Start: 2021-05-29 | End: 2021-06-03 | Stop reason: HOSPADM

## 2021-05-29 RX ADMIN — ATORVASTATIN CALCIUM 40 MG: 40 TABLET, FILM COATED ORAL at 20:28

## 2021-05-29 RX ADMIN — GABAPENTIN 200 MG: 100 CAPSULE ORAL at 20:28

## 2021-05-29 RX ADMIN — APIXABAN 5 MG: 5 TABLET, FILM COATED ORAL at 10:07

## 2021-05-29 RX ADMIN — Medication 3 ML: at 10:08

## 2021-05-29 RX ADMIN — CYANOCOBALAMIN TAB 1000 MCG 1000 MCG: 1000 TAB at 10:07

## 2021-05-29 RX ADMIN — ISOSORBIDE MONONITRATE 60 MG: 60 TABLET, EXTENDED RELEASE ORAL at 20:27

## 2021-05-29 RX ADMIN — GUAIFENESIN AND DEXTROMETHORPHAN 5 ML: 100; 10 SYRUP ORAL at 17:18

## 2021-05-29 RX ADMIN — INSULIN LISPRO 5 UNITS: 100 INJECTION, SOLUTION INTRAVENOUS; SUBCUTANEOUS at 18:16

## 2021-05-29 RX ADMIN — GABAPENTIN 200 MG: 100 CAPSULE ORAL at 10:07

## 2021-05-29 RX ADMIN — FUROSEMIDE 40 MG: 40 TABLET ORAL at 17:18

## 2021-05-29 RX ADMIN — Medication 1000 UNITS: at 10:07

## 2021-05-29 RX ADMIN — ASPIRIN 81 MG CHEWABLE TABLET 81 MG: 81 TABLET CHEWABLE at 10:07

## 2021-05-29 RX ADMIN — DEXTROSE MONOHYDRATE 25 G: 500 INJECTION PARENTERAL at 02:54

## 2021-05-29 RX ADMIN — ATENOLOL 100 MG: 50 TABLET ORAL at 10:07

## 2021-05-29 RX ADMIN — FUROSEMIDE 40 MG: 40 TABLET ORAL at 10:07

## 2021-05-29 RX ADMIN — APIXABAN 5 MG: 5 TABLET, FILM COATED ORAL at 20:28

## 2021-05-29 RX ADMIN — DONEPEZIL HYDROCHLORIDE 10 MG: 5 TABLET, FILM COATED ORAL at 20:28

## 2021-05-29 RX ADMIN — Medication 3 ML: at 20:28

## 2021-05-29 RX ADMIN — IPRATROPIUM BROMIDE 0.5 MG: 0.5 SOLUTION RESPIRATORY (INHALATION) at 11:15

## 2021-05-29 RX ADMIN — DULOXETINE 60 MG: 30 CAPSULE, DELAYED RELEASE ORAL at 10:07

## 2021-05-29 RX ADMIN — IPRATROPIUM BROMIDE 0.5 MG: 0.5 SOLUTION RESPIRATORY (INHALATION) at 15:20

## 2021-05-29 RX ADMIN — CLOPIDOGREL BISULFATE 75 MG: 75 TABLET ORAL at 10:07

## 2021-05-29 RX ADMIN — INSULIN LISPRO 3 UNITS: 100 INJECTION, SOLUTION INTRAVENOUS; SUBCUTANEOUS at 13:30

## 2021-05-29 RX ADMIN — ISOSORBIDE MONONITRATE 60 MG: 60 TABLET, EXTENDED RELEASE ORAL at 10:07

## 2021-05-29 RX ADMIN — IPRATROPIUM BROMIDE 0.5 MG: 0.5 SOLUTION RESPIRATORY (INHALATION) at 19:36

## 2021-05-29 RX ADMIN — INSULIN HUMAN 15 UNITS: 100 INJECTION, SUSPENSION SUBCUTANEOUS at 18:16

## 2021-05-30 LAB
GLUCOSE BLDC GLUCOMTR-MCNC: 141 MG/DL (ref 70–105)
GLUCOSE BLDC GLUCOMTR-MCNC: 180 MG/DL (ref 70–105)
GLUCOSE BLDC GLUCOMTR-MCNC: 184 MG/DL (ref 70–105)
GLUCOSE BLDC GLUCOMTR-MCNC: 184 MG/DL (ref 70–105)
QT INTERVAL: 384 MS
QT INTERVAL: 427 MS
QT INTERVAL: 428 MS
QT INTERVAL: 466 MS

## 2021-05-30 PROCEDURE — 63710000001 INSULIN LISPRO (HUMAN) PER 5 UNITS: Performed by: HOSPITALIST

## 2021-05-30 PROCEDURE — 82962 GLUCOSE BLOOD TEST: CPT

## 2021-05-30 PROCEDURE — 94799 UNLISTED PULMONARY SVC/PX: CPT

## 2021-05-30 PROCEDURE — 63710000001 INSULIN ISOPHANE & REGULAR PER 5 UNITS: Performed by: HOSPITALIST

## 2021-05-30 PROCEDURE — 99231 SBSQ HOSP IP/OBS SF/LOW 25: CPT | Performed by: HOSPITALIST

## 2021-05-30 PROCEDURE — 99232 SBSQ HOSP IP/OBS MODERATE 35: CPT | Performed by: INTERNAL MEDICINE

## 2021-05-30 RX ADMIN — APIXABAN 5 MG: 5 TABLET, FILM COATED ORAL at 20:08

## 2021-05-30 RX ADMIN — DONEPEZIL HYDROCHLORIDE 10 MG: 5 TABLET, FILM COATED ORAL at 20:09

## 2021-05-30 RX ADMIN — ASPIRIN 81 MG CHEWABLE TABLET 81 MG: 81 TABLET CHEWABLE at 09:31

## 2021-05-30 RX ADMIN — ISOSORBIDE MONONITRATE 60 MG: 60 TABLET, EXTENDED RELEASE ORAL at 09:31

## 2021-05-30 RX ADMIN — Medication 1000 UNITS: at 09:31

## 2021-05-30 RX ADMIN — CLOPIDOGREL BISULFATE 75 MG: 75 TABLET ORAL at 09:31

## 2021-05-30 RX ADMIN — ATENOLOL 100 MG: 50 TABLET ORAL at 09:31

## 2021-05-30 RX ADMIN — IPRATROPIUM BROMIDE 0.5 MG: 0.5 SOLUTION RESPIRATORY (INHALATION) at 10:59

## 2021-05-30 RX ADMIN — Medication 3 ML: at 20:08

## 2021-05-30 RX ADMIN — FUROSEMIDE 40 MG: 40 TABLET ORAL at 09:31

## 2021-05-30 RX ADMIN — Medication 3 ML: at 12:24

## 2021-05-30 RX ADMIN — INSULIN LISPRO 3 UNITS: 100 INJECTION, SOLUTION INTRAVENOUS; SUBCUTANEOUS at 12:20

## 2021-05-30 RX ADMIN — INSULIN HUMAN 15 UNITS: 100 INJECTION, SUSPENSION SUBCUTANEOUS at 09:30

## 2021-05-30 RX ADMIN — CYANOCOBALAMIN TAB 1000 MCG 1000 MCG: 1000 TAB at 09:31

## 2021-05-30 RX ADMIN — GABAPENTIN 200 MG: 100 CAPSULE ORAL at 09:31

## 2021-05-30 RX ADMIN — INSULIN LISPRO 3 UNITS: 100 INJECTION, SOLUTION INTRAVENOUS; SUBCUTANEOUS at 17:41

## 2021-05-30 RX ADMIN — IPRATROPIUM BROMIDE 0.5 MG: 0.5 SOLUTION RESPIRATORY (INHALATION) at 07:04

## 2021-05-30 RX ADMIN — FUROSEMIDE 40 MG: 40 TABLET ORAL at 17:41

## 2021-05-30 RX ADMIN — APIXABAN 5 MG: 5 TABLET, FILM COATED ORAL at 09:31

## 2021-05-30 RX ADMIN — IPRATROPIUM BROMIDE 0.5 MG: 0.5 SOLUTION RESPIRATORY (INHALATION) at 19:12

## 2021-05-30 RX ADMIN — DIGOXIN 125 MCG: 0.12 TABLET ORAL at 12:20

## 2021-05-30 RX ADMIN — ISOSORBIDE MONONITRATE 60 MG: 60 TABLET, EXTENDED RELEASE ORAL at 20:09

## 2021-05-30 RX ADMIN — ATORVASTATIN CALCIUM 40 MG: 40 TABLET, FILM COATED ORAL at 20:09

## 2021-05-30 RX ADMIN — GABAPENTIN 200 MG: 100 CAPSULE ORAL at 20:08

## 2021-05-30 RX ADMIN — IPRATROPIUM BROMIDE 0.5 MG: 0.5 SOLUTION RESPIRATORY (INHALATION) at 14:54

## 2021-05-30 RX ADMIN — DULOXETINE 60 MG: 30 CAPSULE, DELAYED RELEASE ORAL at 09:31

## 2021-05-31 LAB
ANION GAP SERPL CALCULATED.3IONS-SCNC: 13 MMOL/L (ref 5–15)
BUN SERPL-MCNC: 44 MG/DL (ref 8–23)
BUN/CREAT SERPL: 26 (ref 7–25)
CALCIUM SPEC-SCNC: 8.8 MG/DL (ref 8.6–10.5)
CHLORIDE SERPL-SCNC: 101 MMOL/L (ref 98–107)
CO2 SERPL-SCNC: 27 MMOL/L (ref 22–29)
CREAT SERPL-MCNC: 1.69 MG/DL (ref 0.76–1.27)
GFR SERPL CREATININE-BSD FRML MDRD: 40 ML/MIN/1.73
GLUCOSE BLDC GLUCOMTR-MCNC: 145 MG/DL (ref 70–105)
GLUCOSE BLDC GLUCOMTR-MCNC: 169 MG/DL (ref 70–105)
GLUCOSE BLDC GLUCOMTR-MCNC: 185 MG/DL (ref 70–105)
GLUCOSE BLDC GLUCOMTR-MCNC: 199 MG/DL (ref 70–105)
GLUCOSE SERPL-MCNC: 165 MG/DL (ref 65–99)
MAGNESIUM SERPL-MCNC: 2.1 MG/DL (ref 1.6–2.4)
POTASSIUM SERPL-SCNC: 4.2 MMOL/L (ref 3.5–5.2)
SODIUM SERPL-SCNC: 141 MMOL/L (ref 136–145)

## 2021-05-31 PROCEDURE — 83735 ASSAY OF MAGNESIUM: CPT | Performed by: INTERNAL MEDICINE

## 2021-05-31 PROCEDURE — 82962 GLUCOSE BLOOD TEST: CPT

## 2021-05-31 PROCEDURE — 99232 SBSQ HOSP IP/OBS MODERATE 35: CPT | Performed by: INTERNAL MEDICINE

## 2021-05-31 PROCEDURE — 63710000001 INSULIN LISPRO (HUMAN) PER 5 UNITS: Performed by: HOSPITALIST

## 2021-05-31 PROCEDURE — 94799 UNLISTED PULMONARY SVC/PX: CPT

## 2021-05-31 PROCEDURE — 80048 BASIC METABOLIC PNL TOTAL CA: CPT | Performed by: INTERNAL MEDICINE

## 2021-05-31 PROCEDURE — 63710000001 INSULIN ISOPHANE & REGULAR PER 5 UNITS: Performed by: HOSPITALIST

## 2021-05-31 RX ORDER — FUROSEMIDE 40 MG/1
40 TABLET ORAL DAILY
Status: DISCONTINUED | OUTPATIENT
Start: 2021-06-01 | End: 2021-06-03 | Stop reason: HOSPADM

## 2021-05-31 RX ADMIN — FUROSEMIDE 40 MG: 40 TABLET ORAL at 07:45

## 2021-05-31 RX ADMIN — Medication 1000 UNITS: at 07:45

## 2021-05-31 RX ADMIN — ATENOLOL 100 MG: 50 TABLET ORAL at 07:45

## 2021-05-31 RX ADMIN — INSULIN HUMAN 15 UNITS: 100 INJECTION, SUSPENSION SUBCUTANEOUS at 07:44

## 2021-05-31 RX ADMIN — CYANOCOBALAMIN TAB 1000 MCG 1000 MCG: 1000 TAB at 07:46

## 2021-05-31 RX ADMIN — IPRATROPIUM BROMIDE 0.5 MG: 0.5 SOLUTION RESPIRATORY (INHALATION) at 19:31

## 2021-05-31 RX ADMIN — GABAPENTIN 200 MG: 100 CAPSULE ORAL at 20:23

## 2021-05-31 RX ADMIN — ISOSORBIDE MONONITRATE 60 MG: 60 TABLET, EXTENDED RELEASE ORAL at 20:23

## 2021-05-31 RX ADMIN — IPRATROPIUM BROMIDE 0.5 MG: 0.5 SOLUTION RESPIRATORY (INHALATION) at 10:52

## 2021-05-31 RX ADMIN — DIGOXIN 125 MCG: 0.12 TABLET ORAL at 12:36

## 2021-05-31 RX ADMIN — APIXABAN 5 MG: 5 TABLET, FILM COATED ORAL at 07:46

## 2021-05-31 RX ADMIN — INSULIN LISPRO 3 UNITS: 100 INJECTION, SOLUTION INTRAVENOUS; SUBCUTANEOUS at 07:44

## 2021-05-31 RX ADMIN — Medication 3 ML: at 20:23

## 2021-05-31 RX ADMIN — DULOXETINE 60 MG: 30 CAPSULE, DELAYED RELEASE ORAL at 07:46

## 2021-05-31 RX ADMIN — INSULIN HUMAN 15 UNITS: 100 INJECTION, SUSPENSION SUBCUTANEOUS at 17:42

## 2021-05-31 RX ADMIN — ASPIRIN 81 MG CHEWABLE TABLET 81 MG: 81 TABLET CHEWABLE at 07:46

## 2021-05-31 RX ADMIN — ATORVASTATIN CALCIUM 40 MG: 40 TABLET, FILM COATED ORAL at 20:23

## 2021-05-31 RX ADMIN — GABAPENTIN 200 MG: 100 CAPSULE ORAL at 07:45

## 2021-05-31 RX ADMIN — INSULIN LISPRO 3 UNITS: 100 INJECTION, SOLUTION INTRAVENOUS; SUBCUTANEOUS at 17:41

## 2021-05-31 RX ADMIN — CLOPIDOGREL BISULFATE 75 MG: 75 TABLET ORAL at 07:46

## 2021-05-31 RX ADMIN — INSULIN LISPRO 3 UNITS: 100 INJECTION, SOLUTION INTRAVENOUS; SUBCUTANEOUS at 12:36

## 2021-05-31 RX ADMIN — APIXABAN 5 MG: 5 TABLET, FILM COATED ORAL at 20:23

## 2021-05-31 RX ADMIN — DONEPEZIL HYDROCHLORIDE 10 MG: 5 TABLET, FILM COATED ORAL at 20:23

## 2021-05-31 RX ADMIN — ISOSORBIDE MONONITRATE 60 MG: 60 TABLET, EXTENDED RELEASE ORAL at 07:46

## 2021-05-31 RX ADMIN — Medication 3 ML: at 08:26

## 2021-05-31 RX ADMIN — IPRATROPIUM BROMIDE 0.5 MG: 0.5 SOLUTION RESPIRATORY (INHALATION) at 15:15

## 2021-06-01 LAB
ANION GAP SERPL CALCULATED.3IONS-SCNC: 10 MMOL/L (ref 5–15)
BUN SERPL-MCNC: 43 MG/DL (ref 8–23)
BUN/CREAT SERPL: 27.7 (ref 7–25)
CALCIUM SPEC-SCNC: 8.4 MG/DL (ref 8.6–10.5)
CHLORIDE SERPL-SCNC: 104 MMOL/L (ref 98–107)
CO2 SERPL-SCNC: 28 MMOL/L (ref 22–29)
CREAT SERPL-MCNC: 1.55 MG/DL (ref 0.76–1.27)
GFR SERPL CREATININE-BSD FRML MDRD: 45 ML/MIN/1.73
GLUCOSE BLDC GLUCOMTR-MCNC: 116 MG/DL (ref 70–105)
GLUCOSE BLDC GLUCOMTR-MCNC: 135 MG/DL (ref 70–105)
GLUCOSE BLDC GLUCOMTR-MCNC: 183 MG/DL (ref 70–105)
GLUCOSE BLDC GLUCOMTR-MCNC: 196 MG/DL (ref 70–105)
GLUCOSE SERPL-MCNC: 92 MG/DL (ref 65–99)
MAGNESIUM SERPL-MCNC: 2 MG/DL (ref 1.6–2.4)
POTASSIUM SERPL-SCNC: 4 MMOL/L (ref 3.5–5.2)
SODIUM SERPL-SCNC: 142 MMOL/L (ref 136–145)

## 2021-06-01 PROCEDURE — 80048 BASIC METABOLIC PNL TOTAL CA: CPT | Performed by: INTERNAL MEDICINE

## 2021-06-01 PROCEDURE — 94799 UNLISTED PULMONARY SVC/PX: CPT

## 2021-06-01 PROCEDURE — 63710000001 INSULIN LISPRO (HUMAN) PER 5 UNITS: Performed by: HOSPITALIST

## 2021-06-01 PROCEDURE — 99232 SBSQ HOSP IP/OBS MODERATE 35: CPT | Performed by: INTERNAL MEDICINE

## 2021-06-01 PROCEDURE — 63710000001 INSULIN ISOPHANE & REGULAR PER 5 UNITS: Performed by: HOSPITALIST

## 2021-06-01 PROCEDURE — 97530 THERAPEUTIC ACTIVITIES: CPT

## 2021-06-01 PROCEDURE — 97535 SELF CARE MNGMENT TRAINING: CPT

## 2021-06-01 PROCEDURE — 82962 GLUCOSE BLOOD TEST: CPT

## 2021-06-01 PROCEDURE — 83735 ASSAY OF MAGNESIUM: CPT | Performed by: INTERNAL MEDICINE

## 2021-06-01 RX ADMIN — GABAPENTIN 200 MG: 100 CAPSULE ORAL at 21:01

## 2021-06-01 RX ADMIN — INSULIN LISPRO 3 UNITS: 100 INJECTION, SOLUTION INTRAVENOUS; SUBCUTANEOUS at 14:56

## 2021-06-01 RX ADMIN — ISOSORBIDE MONONITRATE 60 MG: 60 TABLET, EXTENDED RELEASE ORAL at 21:01

## 2021-06-01 RX ADMIN — Medication 3 ML: at 09:33

## 2021-06-01 RX ADMIN — APIXABAN 5 MG: 5 TABLET, FILM COATED ORAL at 21:01

## 2021-06-01 RX ADMIN — GABAPENTIN 200 MG: 100 CAPSULE ORAL at 09:32

## 2021-06-01 RX ADMIN — ATENOLOL 100 MG: 50 TABLET ORAL at 09:33

## 2021-06-01 RX ADMIN — ISOSORBIDE MONONITRATE 60 MG: 60 TABLET, EXTENDED RELEASE ORAL at 09:32

## 2021-06-01 RX ADMIN — Medication 3 ML: at 21:01

## 2021-06-01 RX ADMIN — DIGOXIN 125 MCG: 0.12 TABLET ORAL at 14:56

## 2021-06-01 RX ADMIN — DULOXETINE 60 MG: 30 CAPSULE, DELAYED RELEASE ORAL at 09:32

## 2021-06-01 RX ADMIN — FUROSEMIDE 40 MG: 40 TABLET ORAL at 09:33

## 2021-06-01 RX ADMIN — ATORVASTATIN CALCIUM 40 MG: 40 TABLET, FILM COATED ORAL at 21:01

## 2021-06-01 RX ADMIN — APIXABAN 5 MG: 5 TABLET, FILM COATED ORAL at 09:33

## 2021-06-01 RX ADMIN — CLOPIDOGREL BISULFATE 75 MG: 75 TABLET ORAL at 09:32

## 2021-06-01 RX ADMIN — DONEPEZIL HYDROCHLORIDE 10 MG: 5 TABLET, FILM COATED ORAL at 21:01

## 2021-06-01 RX ADMIN — CYANOCOBALAMIN TAB 1000 MCG 1000 MCG: 1000 TAB at 09:36

## 2021-06-01 RX ADMIN — IPRATROPIUM BROMIDE 0.5 MG: 0.5 SOLUTION RESPIRATORY (INHALATION) at 11:30

## 2021-06-01 RX ADMIN — INSULIN HUMAN 15 UNITS: 100 INJECTION, SUSPENSION SUBCUTANEOUS at 09:33

## 2021-06-01 RX ADMIN — Medication 1000 UNITS: at 09:32

## 2021-06-01 RX ADMIN — ASPIRIN 81 MG CHEWABLE TABLET 81 MG: 81 TABLET CHEWABLE at 09:33

## 2021-06-01 RX ADMIN — IPRATROPIUM BROMIDE 0.5 MG: 0.5 SOLUTION RESPIRATORY (INHALATION) at 20:07

## 2021-06-01 RX ADMIN — INSULIN HUMAN 15 UNITS: 100 INJECTION, SUSPENSION SUBCUTANEOUS at 17:51

## 2021-06-01 RX ADMIN — INSULIN LISPRO 3 UNITS: 100 INJECTION, SOLUTION INTRAVENOUS; SUBCUTANEOUS at 17:51

## 2021-06-01 NOTE — THERAPY TREATMENT NOTE
Subjective: Pt agreeable to therapeutic plan of care.  Cognition: oriented to Person, Place, Time and Situation    Objective:     Bed Mobility: Mod-A  Functional Transfers: Mod-A and Assist x 2  Functional Ambulation: steps towards BSR with min-mod A x2 for safety     Grooming: Min-A  ADL Position: supported sitting  ADL Comments: Pt min A for brushing hair secondary to fatigue, pt min A for oral hygiene sitting in BSR     Feeding: set up  ADL Position: supported sitting  ADL Comments: Pt set up for eating sitting in BSR    Pain: 4 VAS Chronic LBP  Education: Provided education on importance of mobility and skilled verbal / tactile cueing throughout intervention.     Assessment: Benson Mclean presents with ADL impairments below baseline abilities which indicate the need for continued skilled intervention while inpatient. Tolerating ADL session today without incident. Pt continues to require 2 people for transfers and functional mobility secondary decreased endurance. Will continue to follow and progress as tolerated.     Plan/Recommendations:   Pt would benefit from Inpatient Rehabilitation placement at discharge from facility.   Pt desires Inpatient Rehabilitation placement at discharge. Pt cooperative; agreeable to therapeutic recommendations and plan of care.     Post-Tx Position: Up in Chair, Alarms activated and Call light and personal items within reach  PPE: gloves, surgical mask, eyewear protection

## 2021-06-01 NOTE — THERAPY TREATMENT NOTE
Subjective: Pt agreeable to therapeutic plan of care.    Objective:     Bed mobility - Mod-A supine to sit to eob.  Pt had difficulty sitting at eob initially.  Leaning to the right  Transfers - Mod-A, Assist x 2 and with rolling walker  Ambulation - 5 feet Mod-A, Assist x 2 and with rolling walker    Pain: 4 VAS  Generalized pain   Education: Provided education on importance of mobility and skilled verbal / tactile cueing throughout intervention.     Assessment: Benson Mclean presents with functional mobility impairments which indicate the need for skilled intervention. Tolerating session today without incident. pt making good effort today with physical therapy.  Pt still with significant weakness and is far from his PLOF. Pt does have good potential to recover his mobility with cont physical therapy services. Will continue to follow and progress as tolerated.     Plan/Recommendations:   Pt would benefit from Inpatient Rehabilitation placement at discharge from facility   Pt desires Inpatient Rehabilitation placement at discharge. Pt cooperative; agreeable to therapeutic recommendations and plan of care.     Basic Mobility 6-click:  Rollin = Total, A lot = 2, A little = 3; 4 = None  Supine>Sit:   1 = Total, A lot = 2, A little = 3; 4 = None   Sit>Stand with arms:  1 = Total, A lot = 2, A little = 3; 4 = None  Bed>Chair:   1 = Total, A lot = 2, A little = 3; 4 = None  Ambulate in room:  1 = Total, A lot = 2, A little = 3; 4 = None  3-5 Steps with railin = Total, A lot = 2, A little = 3; 4 = None  Score: 11    Modified Fredy: 4 = Moderately severe disability (Unable to attend to own bodily needs without assistance, and unable to walk unassisted)     Post-Tx Position: Up in Chair, Alarms activated and Call light and personal items within reach

## 2021-06-01 NOTE — PLAN OF CARE
Goal Outcome Evaluation:        Outcome Summary: patient  aler t and oriented, vtial signs stable , patient on 2 liters o2.  awaiting precert for [place].

## 2021-06-01 NOTE — CASE MANAGEMENT/SOCIAL WORK
Continued Stay Note  DOREEN Amin     Patient Name: Benson Mclean  MRN: [id number]  Today's Date: 6/1/2021    Admit Date: 5/18/2021    Discharge Plan     Row Name 06/01/21 1317       Plan    Plan  D/C Plan : [facility] accepted pending precert . [facility] approved .    Plan Comments  Barrier to D/C : Awaiting precert which was started on 5/27/21        Discharge Codes    No documentation.       Expected Discharge Date and Time     Expected Discharge Date Expected Discharge Time    May 24, 2021             Daphne Cervantes RN

## 2021-06-01 NOTE — PROGRESS NOTES
Referring Provider: Kasandra Ocampo MD    Reason for follow-up:  Shortness of breath  Status post CABG  Status post stent       Patient Care Team:  Samantha Zabala APRN as PCP - General  Samantha Zabala APRN as PCP - Family Medicine  Jose G Rm MD as Consulting Physician (Cardiology)    Subjective .  Feeling better     ROS    Since I have last seen, the patient has been without any chest discomfort ,shortness of breath, palpitations, dizziness or syncope.  Denies having any headache ,abdominal pain ,nausea, vomiting , diarrhea constipation, loss of weight or loss of appetite.  Denies having any excessive bruising ,hematuria or blood in the stool.    Review of all systems negative except as indicated    History  Past Medical History:   Diagnosis Date   • Appetite loss    • Brain bleed (CMS/HCC)    • Carpal tunnel syndrome on left     carpal tunnel on left hand   • Diabetic neuropathy (CMS/HCC)    • DM2 (diabetes mellitus, type 2) (CMS/HCC)    • History of angina    • Hyperlipidemia    • Hypogonadism in male    • Obesity    • Sleep apnea    • Stroke (CMS/HCC)    • Unsteady gait        Past Surgical History:   Procedure Laterality Date   • ANGIOPLASTY      X2   • APPENDECTOMY     • CARDIAC CATHETERIZATION  11/13/2015   • CARDIAC CATHETERIZATION N/A 5/24/2021    Procedure: Left Heart Cath and coronary angiogram;  Surgeon: Jose G Rm MD;  Location: [facility] CATH INVASIVE LOCATION;  Service: Cardiovascular;  Laterality: N/A;   • CARDIAC CATHETERIZATION  5/24/2021    Procedure: Saphenous Vein Graft;  Surgeon: Jose G Rm MD;  Location: [facility] CATH INVASIVE LOCATION;  Service: Cardiovascular;;   • CARDIAC CATHETERIZATION N/A 5/24/2021    Procedure: Percutaneous Coronary Intervention;  Surgeon: Bernabe Zambrano MD;  Location: [facility] CATH INVASIVE LOCATION;  Service: Cardiology;  Laterality: N/A;   • CARDIAC CATHETERIZATION N/A 5/24/2021    Procedure: Stent NIELS coronary;  Surgeon: Bernabe Zambrano MD;  Location:   [facility] CATH INVASIVE LOCATION;  Service: Cardiology;  Laterality: N/A;   • CARDIAC CATHETERIZATION N/A 5/26/2021    Procedure: Percutaneous Coronary Intervention;  Surgeon: Bernabe Zambrano MD;  Location: [facility] CATH INVASIVE LOCATION;  Service: Cardiovascular;  Laterality: N/A;   • CARDIAC CATHETERIZATION N/A 5/26/2021    Procedure: Stent NIELS bypass graft;  Surgeon: Bernabe Zambrano MD;  Location: [facility] CATH INVASIVE LOCATION;  Service: Cardiovascular;  Laterality: N/A;   • CARDIAC ELECTROPHYSIOLOGY PROCEDURE N/A 5/24/2021    Procedure: Temporary Pacemaker;  Surgeon: Bernabe Zambrano MD;  Location:  [facility] CATH INVASIVE LOCATION;  Service: Cardiology;  Laterality: N/A;   • CARDIAC ELECTROPHYSIOLOGY PROCEDURE N/A 5/26/2021    Procedure: Temporary Pacemaker;  Surgeon: Bernabe Zambrano MD;  Location: [facility] CATH INVASIVE LOCATION;  Service: Cardiovascular;  Laterality: N/A;   • CARPAL TUNNEL RELEASE Left 04/29/2018    carpal tunnel- lt hand// other hand surgeries    • CATARACT EXTRACTION, BILATERAL  2002    Dr. Lux Acosta   • CHOLECYSTECTOMY     • COLON RESECTION  2005   • CORONARY ANGIOPLASTY     • CORONARY ANGIOPLASTY WITH STENT PLACEMENT  11/13/2015    PTCA stent to proximal in stent and mid to distal lad   • CORONARY ANGIOPLASTY WITH STENT PLACEMENT  09/16/2016    PTCA stent to mid lad and stent to vein graft to marginal   • CORONARY ARTERY BYPASS GRAFT  2005    @ [facility]   • CYST REMOVAL      cyst removed from scrotum   • FOOT SURGERY Right 07/17/2018   • FOOT SURGERY Left 02/04/2019   • TOE AMPUTATION Right     right toe removed D/T infected cut that went to the bone       Family History   Problem Relation Age of Onset   • Heart disease Mother    • Heart disease Father    • Diabetes Sister    • Heart disease Sister    • Diabetes Brother    • Mental illness Brother        Social History     Tobacco Use   • Smoking status: Former Smoker     Packs/day: 1.00     Types: Cigarettes   • Smokeless tobacco: Never Used   Vaping Use   •  Vaping Use: Never used   Substance Use Topics   • Alcohol use: No   • Drug use: Yes     Frequency: 1.0 times per week     Types: Marijuana     Comment: socially        Medications Prior to Admission   Medication Sig Dispense Refill Last Dose   • apixaban (ELIQUIS) 5 MG tablet tablet Take 1 tablet by mouth Every 12 (Twelve) Hours. Indications: Atrial Fibrillation 60 tablet 0    • aspirin 81 MG chewable tablet Chew 81 mg Daily.      • atenolol (TENORMIN) 100 MG tablet Take 100 mg by mouth Daily.      • atorvastatin (LIPITOR) 40 MG tablet Take 40 mg by mouth Every Night.      • bumetanide (BUMEX) 1 MG tablet Take 1 mg by mouth Every Morning.      • cholecalciferol (VITAMIN D3) 25 MCG (1000 UT) tablet Take 1,000 Units by mouth Daily.      • digoxin (LANOXIN) 125 MCG tablet Take 125 mcg by mouth Daily. In the afternoon      • donepezil (ARICEPT) 10 MG tablet Take 10 mg by mouth Every Night.      • DULoxetine (CYMBALTA) 60 MG capsule Take 60 mg by mouth Daily.      • gabapentin (NEURONTIN) 100 MG capsule Take 200 mg by mouth 2 (two) times a day.      • Glucagon, rDNA, (Glucagon Emergency) 1 MG kit Inject 1 mg as directed 1 (One) Time As Needed (hypoglycemia).      • hydrALAZINE (APRESOLINE) 50 MG tablet Take 50 mg by mouth 2 (two) times a day. Hold for SBP <110      • insulin NPH-insulin regular (humuLIN 70/30,novoLIN 70/30) (70-30) 100 UNIT/ML injection Inject 30 Units under the skin into the appropriate area as directed 2 (Two) Times a Day With Meals.      • isosorbide mononitrate (IMDUR) 60 MG 24 hr tablet Take 60 mg by mouth 2 (Two) Times a Day.      • metFORMIN ER (GLUCOPHAGE-XR) 500 MG 24 hr tablet Take 500 mg by mouth 2 (two) times a day.      • Omega-3 Fatty Acids (fish oil) 1000 MG capsule capsule Take 1,000 mg by mouth Daily.      • Skin Protectants, Misc. (eucerin) cream Apply 1 application topically to the appropriate area as directed 2 (Two) Times a Day As Needed for Dry Skin. To upper & lower extremities       • tiotropium (Spiriva HandiHaler) 18 MCG per inhalation capsule Place 1 capsule into inhaler and inhale Daily. 30 capsule 1    • vitamin B-12 (CYANOCOBALAMIN) 1000 MCG tablet Take 1,000 mcg by mouth Daily.      • [DISCONTINUED] HYDROcodone-acetaminophen (NORCO) 7.5-325 MG per tablet Take 1 tablet by mouth Every 12 (Twelve) Hours As Needed for Mild Pain  or Moderate Pain .          Allergies  Codeine    Scheduled Meds:apixaban, 5 mg, Oral, Q12H  aspirin, 81 mg, Oral, Daily  atenolol, 100 mg, Oral, Daily  atorvastatin, 40 mg, Oral, Nightly  cholecalciferol, 1,000 Units, Oral, Daily  clopidogrel, 75 mg, Oral, Daily  digoxin, 125 mcg, Oral, Daily  donepezil, 10 mg, Oral, Nightly  DULoxetine, 60 mg, Oral, Daily  furosemide, 40 mg, Oral, Daily  gabapentin, 200 mg, Oral, Q12H  insulin lispro, 0-14 Units, Subcutaneous, TID AC  insulin NPH-insulin regular, 15 Units, Subcutaneous, BID With Meals  ipratropium, 0.5 mg, Nebulization, 4x Daily - RT  isosorbide mononitrate, 60 mg, Oral, BID  sodium chloride, 3 mL, Intravenous, Q12H  vitamin B-12, 1,000 mcg, Oral, Daily      Continuous Infusions:   PRN Meds:.•  acetaminophen  •  atropine  •  dextrose  •  dextrose  •  glucagon (human recombinant)  •  guaiFENesin-dextromethorphan  •  hydrALAZINE  •  HYDROcodone-acetaminophen  •  insulin lispro **AND** insulin lispro  •  ipratropium-albuterol  •  nitroglycerin  •  ondansetron **OR** ondansetron  •  [COMPLETED] Insert peripheral IV **AND** sodium chloride  •  sodium chloride  •  sodium chloride  •  sodium chloride    Objective     VITAL SIGNS  Vitals:    05/31/21 1936 05/31/21 2204 06/01/21 0301 06/01/21 0528   BP:  150/90 132/54 129/55   BP Location:  Left arm Left arm Left arm   Patient Position:  Lying Lying Lying   Pulse: 75 87 75 74   Resp: 20 21 23 22   Temp:  98.4 °F (36.9 °C) 97.8 °F (36.6 °C) 98.7 °F (37.1 °C)   TempSrc:  Oral Oral Oral   SpO2: 100% 95% 100% 97%   Weight:    121 kg (267 lb 13.7 oz)   Height:      "      Flowsheet Rows      First Filed Value   Admission Height  175.3 cm (69\") Documented at 05/18/2021 2219   Admission Weight  122 kg (270 lb) Documented at 05/18/2021 2219            Intake/Output Summary (Last 24 hours) at 6/1/2021 0636  Last data filed at 6/1/2021 0031  Gross per 24 hour   Intake 960 ml   Output 2050 ml   Net -1090 ml        TELEMETRY: Sinus rhythm    Physical Exam:  The patient is alert, oriented and in no distress.  Vital signs as noted above.  Exogenous obesity.  Head and neck revealed no carotid bruits or jugular venous distention.  No thyromegaly or lymphadenopathy is present  Lungs clear.  No wheezing.  Breath sounds are normal bilaterally.  Heart normal first and second heart sounds.  No murmur. No precordial rub is present.  No gallop is present.  Abdomen soft and nontender.  No organomegaly is present.  Extremities with good peripheral pulses.  2+ edema.  Cardiac cath site looks normal.  Skin warm and dry.  Musculoskeletal system is grossly normal  CNS grossly normal      Results Review:   I reviewed the patient's new clinical results.  Lab Results (last 24 hours)     Procedure Component Value Units Date/Time    Basic Metabolic Panel [[id number]]  (Abnormal) Collected: 06/01/21 0237    Specimen: Blood Updated: 06/01/21 0411     Glucose 92 mg/dL      BUN 43 mg/dL      Creatinine 1.55 mg/dL      Sodium 142 mmol/L      Potassium 4.0 mmol/L      Chloride 104 mmol/L      CO2 28.0 mmol/L      Calcium 8.4 mg/dL      eGFR Non African Amer 45 mL/min/1.73      BUN/Creatinine Ratio 27.7     Anion Gap 10.0 mmol/L     Narrative:      GFR Normal >60  Chronic Kidney Disease <60  Kidney Failure <15      Magnesium [[id number]]  (Normal) Collected: 06/01/21 0237    Specimen: Blood Updated: 06/01/21 0411     Magnesium 2.0 mg/dL     POC Glucose Once [[id number]]  (Abnormal) Collected: 05/31/21 1945    Specimen: Blood Updated: 05/31/21 1946     Glucose 145 mg/dL      Comment: Serial Number: " [id number]:  [id number]       POC Glucose Once [[id number]]  (Abnormal) Collected: 05/31/21 1602    Specimen: Blood Updated: 05/31/21 1603     Glucose 185 mg/dL      Comment: Serial Number: [id number]:  [id number]       POC Glucose Once [[id number]]  (Abnormal) Collected: 05/31/21 1157    Specimen: Blood Updated: 05/31/21 1158     Glucose 199 mg/dL      Comment: Serial Number: [id number]:  [id number]             Imaging Results (Last 24 Hours)     ** No results found for the last 24 hours. **      LAB RESULTS (LAST 7 DAYS)    CBC  Results from last 7 days   Lab Units 05/28/21 0328 05/26/21  0503 05/25/21  2049   WBC 10*3/mm3 6.70 6.10 7.60   RBC 10*6/mm3 2.80* 2.83* 2.89*   HEMOGLOBIN g/dL 8.4* 8.5* 8.8*   HEMATOCRIT % 25.4* 25.8* 26.3*   MCV fL 90.6 91.1 91.0   PLATELETS 10*3/mm3 241 258 258       BMP  Results from last 7 days   Lab Units 06/01/21 0237 05/31/21 0205 05/29/21 0147 05/28/21 0328 05/27/21  1139 05/26/21  0503 05/25/21  2049   SODIUM mmol/L 142 141 142 143 141 142 142   POTASSIUM mmol/L 4.0 4.2 4.0 4.0 4.3 4.0 4.1   CHLORIDE mmol/L 104 101 106 105 104 104 103   CO2 mmol/L 28.0 27.0 26.0 26.0 26.0 27.0 27.0   BUN mg/dL 43* 44* 44* 46* 45* 48* 46*   CREATININE mg/dL 1.55* 1.69* 1.56* 1.56* 1.68* 1.84* 1.86*   GLUCOSE mg/dL 92 165* 55* 52* 177* 78 139*   MAGNESIUM mg/dL 2.0 2.1  --   --   --  2.2  --        CMP   Results from last 7 days   Lab Units 06/01/21 0237 05/31/21 0205 05/29/21 0147 05/28/21 0328 05/27/21  1139 05/26/21  0503 05/25/21  2049   SODIUM mmol/L 142 141 142 143 141 142 142   POTASSIUM mmol/L 4.0 4.2 4.0 4.0 4.3 4.0 4.1   CHLORIDE mmol/L 104 101 106 105 104 104 103   CO2 mmol/L 28.0 27.0 26.0 26.0 26.0 27.0 27.0   BUN mg/dL 43* 44* 44* 46* 45* 48* 46*   CREATININE mg/dL 1.55* 1.69* 1.56* 1.56* 1.68* 1.84* 1.86*   GLUCOSE mg/dL 92 165* 55* 52* 177* 78 139*         BNP        TROPONIN  Results from last 7 days   Lab Units 05/26/21  0503   TROPONIN T ng/mL 0.190*        CoAg        Creatinine Clearance  Estimated Creatinine Clearance: 57.2 mL/min (A) (by C-G formula based on SCr of 1.55 mg/dL (H)).    ABG        Radiology  No radiology results for the last day            EKG          I personally viewed and interpreted the patient's EKG/Telemetry data: Sinus rhythm nonspecific ST-T wave changes    ECHOCARDIOGRAM:    Results for orders placed during the hospital encounter of 05/06/21    Adult Transthoracic Echo Complete w/ Color, Spectral and Contrast if necessary per protocol    Interpretation Summary  Technically difficult study due to poor acoustic windows, not all left ventricular walls are well visualized therefore Lumason contrast was given to assess LV function..  LVE with basal inferior wall hypokinesis and septal dyskinesis, estimated LV ejection fraction of 35%  Normal RV size  Biatrial enlargement seen.  Aortic valve, mitral valve, tricuspid valve appears structurally normal, no significant regurgitation seen.  No pericardial effusion seen.  Proximal aorta appears normal in size.          STRESS MYOVIEW:    Cardiolite (Tc-99m Sestamibi) stress test    CARDIAC CATHETERIZATION:            OTHER:         Assessment/Plan     Principal Problem:    Abnormal nuclear stress test  Active Problems:    Elevated troponin    Acute on chronic congestive heart failure (CMS/HCC)    Type 2 diabetes mellitus with hyperglycemia (CMS/HCC)    Cytokine release syndrome, grade 1    Unstable angina (CMS/HCC)      [[[[[[[[[[[[[[[[[[[[[[[[[    Impression  ==============  -Acute on chronic congestive heart failure.  Recent echocardiogram showed left ventricle dysfunction with ejection fraction of 35%.     -History of right-sided weakness with left MCA territory stroke.-Improved      -status post CABG 2005. status post stent to LAD 2009    Status post stent to LAD 11/13/2015  Status post stent to mid LAD and SVG to marginal branch 09/16/2016.  Status post stent to mid LAD 5/24/2021  Status  post stent to SVG to RCA 5/26/2021    Cardiac catheterization 5/24/2021 revealed  Left ventricle angiogram was not performed due to pre-existing renal dysfunction.  Left main coronary artery normal.  Left anterior descending artery has mid segment lengthy 90% disease within the previously placed stent.  Distal left into descending artery has diffuse disease.  Circumflex coronary artery is totally occluded.  (Chronic)  Right coronary artery is chronically occluded.  SVG to marginal branch (jump graft to OM1 and OM 2) is totally occluded (new finding compared to 2015.  SVG to PDA has distal radiolucency.  However no definite obstructive disease is present.         -status post myocardial infarction 2000 and 2002 .      -history of intermittent atrial fibrillation-maintaining sinus rhythm     Transesophageal echocardiogram 11/30/2020.  Biatrial enlargement.  Smoking effect in the left atrium and left ventricle and left atrial appendage.  Mild mitral and aortic regurgitation.  Left ventricle enlargement with diffuse hypocontractility with ejection fraction of 35 to 40%.     Echocardiogram 11/27/2020 revealed left atrial enlargement left ventricle dysfunction with ejection fraction of 40%.  Negative bubble study.      -diabetes hypertension and sleep apnea in history of renal dysfunction .  Recent BUN/creatinine 38/1.36     -status post colon surgery (partial colectomy) appendectomy cholecystectomy right 4th toe removal and carpal tunnel surgery      -continued smoking 1 pack per day -abstinence from smoking      -allergy to codeine.  ============   Plan  ================  Status post CABG  Abnormal stress Cardiolite test.    Cardiac catheterization 5/24/2021 revealed  Patient had intervention to left anterior descending artery.  5/24/2021  Spontaneous prolonged episode of asystole is of concern.  Patient had recovered without any intervention or CPR.  We will closely observe for any bradycardia arrhythmias and patient  may need pacemaker/ICD implantation.    Status post stent to LAD 5/24/2021.  Status post stent to SVG to RCA 5/26/2021    Left ventricle dysfunction with ejection fraction of 35%.  Patient has acute on chronic congestive heart failure. Compensated at this time.     Paroxysmal atrial fibrillation.  Patient is mostly in sinus rhythm.    Renal dysfunction stable  BUN 51 creatinine 2.0.  54/2.14  51/2.01  52/1.79  48/1.7-5/23/2021  48/1.79-5/25/2021  48/1.84-5/26/2021  46/1.56-5/28/2021  44/1.56-5/29/2021  44/1.69-5/31/2021  43/1.55-6/1/2021     Anemia  Hemoglobin 8.8  8.5  8.4-5/28/2021     Anticoagulation  Eliquis restarted.    Medications were reviewed and updated.    Waiting for transfer to rehab.    Follow-up in the office in 2 weeks.    Current medications include Eliquis aspirin atenolol atorvastatin Plavix Lanoxin isosorbide.    Follow-up labs ordered.    Further plan will depend on patient's progress.   ]]]]]]]]]]]]]]]]]]]]]]              Jose G Rm MD  06/01/21  06:36 EDT

## 2021-06-01 NOTE — PLAN OF CARE
Problem: Adult Inpatient Plan of Care  Goal: Plan of Care Review  6/1/2021 0501 by Ruddy Gutierrez RN  Outcome: Ongoing, Progressing  Flowsheets (Taken 6/1/2021 0501)  Progress: no change  Plan of Care Reviewed With: patient  Outcome Summary: Patient has experienced no acute events during shift. Patient has rested well. Will continue to monitor.   Goal Outcome Evaluation:

## 2021-06-01 NOTE — PROGRESS NOTES
[facility] Medicine Services Daily Progress Note      Hospitalist Team  LOS 9 days      Patient Care Team:  Samantha Zabala APRN as PCP - General  Samantha Zabala APRN as PCP - Family Medicine  Jose G Rm MD as Consulting Physician (Cardiology)    Patient Location: 2109/1      Subjective   Subjective     Chief Complaint / Subjective  Chief Complaint   Patient presents with   • Shortness of Breath         Brief Synopsis of Hospital Course/HPI    The patient is a 70 years old male with history of CAD s/p CABG, paroxysmal atrial fibrillation, HFrEF 35%, hypertension, hyperlipidemia, IDDM, and CKD stage IIIb.    The patient presented to the emergency room on 5/18/2021 for evaluation of worsening dyspnea on arrest in association with productive clear cough.  The patient was recently hospitalized between 5/6/2021 and 5/8/2021 for acute on chronic heart failure.  Nephrology was consulted before the patient went for LHC.       Hospital course:    The patient was admitted to the medical floor.  He was evaluated by cardiologist.  He underwent stress test on 5/20/2021 which was abnormal with inferior and posterior lateral ischemia.  The patient underwent LHC on 5/24/2021 with stent to LAD complicated with sinus pause which resolved.  The patient had repeat LHC on 5/26/2021 with stent to SVG to RCA.  The patient was evaluated by physical therapy and recommended inpatient rehab.    Date::    5/24/21: OOB to chair.  Denies home oxygen use.  S/p LHC --> NIELS to LAD.  Episodes of sinus pause.    5/25/21: OOB to chair.  No complaints.  Tolerating diet.  5/26/21: No complaints. S/p LHC with stent to SVG to RCA  5/27/21: Denies shortness of air.  Uses CPAP at night.  Awaiting [facility] placement.  5/28/21: Waiting for rehab.  5/29/21: Morning BS low.  Held NPH.  Reduced dose to 20 units twice daily.  Awaiting placement.  5/30/21: No complaints.  Awaiting bed.  Monitor for hypoglycemia.  5/31/2021  The patient  "remains in a holding pattern awaiting his pre-CERT for inpatient rehab.  He offers no new complaints today.  He seems in a fairly  mood.    6/1/2021  Patient offers no new complaints.  He has not had any episodes of bradycardia nor has he had any episodes of chest pain.    Review of Systems   Constitutional: Negative.   HENT: Negative.    Eyes: Negative.    Cardiovascular: Negative.    Respiratory: Negative.    Endocrine: Negative.    Hematologic/Lymphatic: Negative.    Skin: Negative.    Musculoskeletal: Negative.         Diffuse weakness throughout unchanged.   Gastrointestinal: Negative.    Genitourinary: Negative.    Neurological: Negative.    Psychiatric/Behavioral: Negative.    Allergic/Immunologic: Negative.    All other systems reviewed and are negative.    Objective   Objective      Vital Signs  Temp:  [97.8 °F (36.6 °C)-98.7 °F (37.1 °C)] 97.8 °F (36.6 °C)  Heart Rate:  [] 59  Resp:  [18-23] 20  BP: (120-150)/(47-90) 121/47  Oxygen Therapy  SpO2: 93 %  Pulse Oximetry Type: Continuous  Device (Oxygen Therapy): nasal cannula  Flow (L/min): 2  Oxygen Concentration (%): 28  Flowsheet Rows      First Filed Value   Admission Height  175.3 cm (69\") Documented at 05/18/2021 2219   Admission Weight  122 kg (270 lb) Documented at 05/18/2021 2219        Intake & Output (last 3 days)       05/29 0701 - 05/30 0700 05/30 0701 - 05/31 0700 05/31 0701 - 06/01 0700 06/01 0701 - 06/02 0700    P.O. 786 720 960 720    IV Piggyback        Total Intake(mL/kg) 786 (6.5) 720 (5.9) 960 (7.9) 720 (6)    Urine (mL/kg/hr) 1825 (0.6) 3225 (1.1) 1550 (0.5)     Stool   0     Total Output 1825 3225 1550     Net -1039 -2505 -590 +720            Urine Unmeasured Occurrence   1 x     Stool Unmeasured Occurrence   1 x         Lines, Drains & Airways    Active LDAs     Name:   Placement date:   Placement time:   Site:   Days:    Peripheral IV 05/18/21 2246 Right Antecubital   05/18/21 2246    Antecubital   5          "   Physical Exam:  Physical Exam  HENT:      Head: Normocephalic.      Nose: Nose normal.   Eyes:      Pupils: Pupils are equal, round, and reactive to light.   Pulmonary:      Breath sounds: Normal breath sounds.   Abdominal:      General: Bowel sounds are normal.      Tenderness: There is no abdominal tenderness.      Comments: Obese abdomen   Musculoskeletal:      Comments: Moving all extremities equally   Lymphadenopathy:      Cervical: No cervical adenopathy.   Skin:     General: Skin is warm.      Findings: No rash.   Neurological:      Mental Status: He is alert and oriented to person, place, and time.   Psychiatric:         Mood and Affect: Mood normal.         Behavior: Behavior is cooperative.       Procedures:  Procedure(s):  Left Heart Cath and coronary angiogram    Results Review:     I reviewed the patient's new clinical results.      Lab Results (last 24 hours)     Procedure Component Value Units Date/Time    POC Glucose Once [[id number]]  (Abnormal) Collected: 06/01/21 1659    Specimen: Blood Updated: 06/01/21 1700     Glucose 196 mg/dL      Comment: Serial Number: [id number]:  [id number]       POC Glucose Once [[id number]]  (Abnormal) Collected: 06/01/21 1215    Specimen: Blood Updated: 06/01/21 1217     Glucose 183 mg/dL      Comment: Serial Number: [id number]:  [id number]       POC Glucose Once [[id number]]  (Abnormal) Collected: 06/01/21 0713    Specimen: Blood Updated: 06/01/21 0714     Glucose 116 mg/dL      Comment: Serial Number: [id number]:  [id number]       Basic Metabolic Panel [[id number]]  (Abnormal) Collected: 06/01/21 0237    Specimen: Blood Updated: 06/01/21 0411     Glucose 92 mg/dL      BUN 43 mg/dL      Creatinine 1.55 mg/dL      Sodium 142 mmol/L      Potassium 4.0 mmol/L      Chloride 104 mmol/L      CO2 28.0 mmol/L      Calcium 8.4 mg/dL      eGFR Non African Amer 45 mL/min/1.73      BUN/Creatinine Ratio 27.7     Anion Gap 10.0 mmol/L     Narrative:      GFR Normal  >60  Chronic Kidney Disease <60  Kidney Failure <15      Magnesium [[id number]]  (Normal) Collected: 06/01/21 0237    Specimen: Blood Updated: 06/01/21 0411     Magnesium 2.0 mg/dL         No results found for: HGBA1C            No results found for: LIPASE  Lab Results   Component Value Date    CHOL 165 11/27/2020    CHLPL 116 09/28/2020    TRIG 198 (H) 11/27/2020    HDL 42 11/27/2020    LDL 89 11/27/2020       No results found for: INTRAOP, PREDX, FINALDX, COMDX    Microbiology Results (last 10 days)     ** No results found for the last 240 hours. **          ECG/EMG Results (most recent)     Procedure Component Value Units Date/Time    ECG 12 Lead [[id number]] Collected: 05/18/21 2235     Updated: 05/20/21 0840     QT Interval 413 ms     Narrative:      HEART RATE= 62  bpm  RR Interval= 972  ms  ND Interval= 113  ms  P Horizontal Axis= -87  deg  P Front Axis= 0  deg  QRSD Interval= 104  ms  QT Interval= 413  ms  QRS Axis= 58  deg  T Wave Axis= 251  deg  - ABNORMAL ECG -  Atrial fibrillation  When compared with ECG of 07-May-2021 4:48:57,  Electronically Signed By: Jacob Amador (Twan) 20-May-2021 08:39:56  Date and Time of Study: 2021-05-18 22:35:54    EP/CRM Study [[id number]] Resulted: 05/24/21 2217     Updated: 05/26/21 0832    Narrative:      Procedures:  #1 stent placement to the LAD  2. Temporary pacemaker placed    Indication:  Unstable angina    Comments:  After the cardiac catheterization is complete patient was given aspirin   Plavix and heparin per weightbase protocol. A 6 [place] sheath was placed   in the right femoral artery. Then a temporary pacemaker was placed in the   right ventricle and set at a rate of 50 bpm and MA of 10. A 6 [place] XB   guide was used and left coronary artery was engaged. Multiple views were   taken. A 190 cm whisper wire was used in place the distal portion of the   LAD. Then a 2.5/20 mm balloon was used and inflated at 10 dionna each time   for about 10 seconds x 2 times.  Thereafter a 2.5/38 mm Xience stent was   used and placed across the lesion and inflated at 16 dionna for 15 seconds.   Thereafter the balloon was taken. Review angiogram showed no dissection or   perforation. Patient tolerated the procedure very well. No complication   noted.  The temporary pacemaker was then removed without any complications.    Impression: Successful stent implantation of a drug-eluting stent in the   proximal to mid LAD without any complications.    There is 80 to 90% disease in the proximal to mid LAD with DENIZ-3 flow   before PCI and 0% with DENIZ-3 flow after PCI    EP/CRM Study [[id number]] Resulted: 05/26/21 1948     Updated: 05/27/21 0722    Narrative:      Procedure: #1 stent placement to the SVG to the RCA  2.  Temporary pacemaker placement    Indication; unstable angina    Comments: Informed consent was obtained to the patient after explained   risks and benefits.  Patient was brought to cardiac catheter laboratory   and placed in the table usual fashion.  Right groin was shaved and prepped   in sterile fashion.  2% lidocaine was used to administer local esthesia to   right groin.  A total of 20 cc was used.  A 6 [place] sheath was placed in   the right femoral vein.  Then a temporary pacemaker was placed in the   right ventricle and placed at a rate of 50 bpm and MAR 5.  A 6 [place]   sheath was placed in the right femoral artery.  Thereafter 6 [place]   multipurpose guide was used and the graft to the right coronary artery was   engaged.  Aspirin Plavix and heparin per weightbase protocol were given.    A 190 cm whisper wire was gently the distal portion of the RCA.  Then a   4.0/15 mm Xience stent was used and placed across the lesion and inflated   at 12 to 14 dionna for 10 seconds.  Thereafter the balloon was taken out.    Reviewing angiogram showed no dissection or perforation.  Patient   tolerated the procedure very well but he was paced for a few seconds and   hence he was observed for few  minutes and then the temporary pacemaker was   taken out.  No complication noted.    Impression: Successful stent implantation of a drug-eluting stent in the   distal portion of the SVG to the RCA and using a temporary pacemaker.    There is haziness with about 70 to 80% disease in the distal portion of   the RCA with DENIZ-3 flow before PCI and 0% with DENIZ-3 flow after PCI    ECG 12 Lead [[id number]] Collected: 05/29/21 1149     Updated: 05/29/21 1151     QT Interval 433 ms     Narrative:      HEART RATE= 62  bpm  RR Interval= 988  ms  NH Interval= 178  ms  P Horizontal Axis=   deg  P Front Axis= 0  deg  QRSD Interval= 106  ms  QT Interval= 433  ms  QRS Axis= 43  deg  T Wave Axis= 208  deg  - ABNORMAL ECG -  Sinus rhythm  Multiform ventricular premature complexes  When compared with ECG of 26-May-2021 0:02:43,  Significant axis, voltage or hypertrophy change  Electronically Signed By:   Date and Time of Study: 2021-05-29 11:49:58    ECG 12 Lead [[id number]] Collected: 05/26/21 0002     Updated: 05/30/21 1818     QT Interval 466 ms     Narrative:      HEART RATE= 60  bpm  RR Interval= 1008  ms  NH Interval=   ms  P Horizontal Axis=   deg  P Front Axis=   deg  QRSD Interval= 104  ms  QT Interval= 466  ms  QRS Axis= 57  deg  T Wave Axis= 229  deg  - ABNORMAL ECG -  A FIB  Low voltage, extremity leads  When compared with ECG of 22-May-2021 15:58:40,  NO SIGNIFICANT CHANGE FROM PREVIOUS ECG  Electronically Signed By: Jose G Rm (ZEYNEP) 30-May-2021 18:17:42  Date and Time of Study: 2021-05-26 00:02:43    ECG 12 Lead [[id number]] Collected: 05/22/21 1558     Updated: 05/30/21 1819     QT Interval 427 ms     Narrative:      HEART RATE= 65  bpm  RR Interval= 1012  ms  NH Interval= 238  ms  P Horizontal Axis= 252  deg  P Front Axis= 0  deg  QRSD Interval= 107  ms  QT Interval= 427  ms  QRS Axis= 39  deg  T Wave Axis= 240  deg  - ABNORMAL ECG -  A FIB  LVH with secondary repolarization abnormality  When compared with ECG of  22-May-2021 0:09:41,  NO SIGNIFICANT CHANGE FROM PREVIOUS ECG  Electronically Signed By: Jose G Rm (ZEYNEP) 30-May-2021 18:18:32  Date and Time of Study: 2021-05-22 15:58:40    ECG 12 Lead [[id number]] Collected: 05/22/21 0009     Updated: 05/30/21 1819     QT Interval 428 ms     Narrative:      HEART RATE= 68  bpm  RR Interval= 876  ms  NM Interval=   ms  P Horizontal Axis=   deg  P Front Axis=   deg  QRSD Interval= 112  ms  QT Interval= 428  ms  QRS Axis= 51  deg  T Wave Axis= 226  deg  - ABNORMAL ECG -  Atrial flutter/fibrillation  Low voltage, extremity leads  When compared with ECG of 21-May-2021 18:33:28,  Significant rate decrease  Significant repolarization change  Significant axis, voltage or hypertrophy change  Electronically Signed By: Jose G Rm (ZEYNEP) 30-May-2021 18:18:42  Date and Time of Study: 2021-05-22 00:09:41    ECG 12 Lead [[id number]] Collected: 05/21/21 1833     Updated: 05/30/21 1819     QT Interval 384 ms     Narrative:      HEART RATE= 92  bpm  RR Interval= 653  ms  NM Interval=   ms  P Horizontal Axis=   deg  P Front Axis=   deg  QRSD Interval= 102  ms  QT Interval= 384  ms  QRS Axis= 43  deg  T Wave Axis= 253  deg  - ABNORMAL ECG -  Atrial fibrillation  Ventricular premature complex  Nonspecific repol abnormality, diffuse leads  When compared with ECG of 18-May-2021 22:35:54,  Significant rate increase  Electronically Signed By: Jose G Rm (ZEYNEP) 30-May-2021 18:18:51  Date and Time of Study: 2021-05-21 18:33:28              Results for orders placed during the hospital encounter of 05/06/21    Adult Transthoracic Echo Complete w/ Color, Spectral and Contrast if necessary per protocol    Interpretation Summary  Technically difficult study due to poor acoustic windows, not all left ventricular walls are well visualized therefore Lumason contrast was given to assess LV function..  LVE with basal inferior wall hypokinesis and septal dyskinesis, estimated LV ejection fraction of  35%  Normal RV size  Biatrial enlargement seen.  Aortic valve, mitral valve, tricuspid valve appears structurally normal, no significant regurgitation seen.  No pericardial effusion seen.  Proximal aorta appears normal in size.      No radiology results for the last 7 days        Xrays, labs reviewed personally by physician.    Medication Review:   I have reviewed the patient's current medication list      Scheduled Meds  apixaban, 5 mg, Oral, Q12H  aspirin, 81 mg, Oral, Daily  atenolol, 100 mg, Oral, Daily  atorvastatin, 40 mg, Oral, Nightly  cholecalciferol, 1,000 Units, Oral, Daily  clopidogrel, 75 mg, Oral, Daily  digoxin, 125 mcg, Oral, Daily  donepezil, 10 mg, Oral, Nightly  DULoxetine, 60 mg, Oral, Daily  furosemide, 40 mg, Oral, Daily  gabapentin, 200 mg, Oral, Q12H  insulin lispro, 0-14 Units, Subcutaneous, TID AC  insulin NPH-insulin regular, 15 Units, Subcutaneous, BID With Meals  ipratropium, 0.5 mg, Nebulization, 4x Daily - RT  isosorbide mononitrate, 60 mg, Oral, BID  sodium chloride, 3 mL, Intravenous, Q12H  vitamin B-12, 1,000 mcg, Oral, Daily        Meds Infusions       Meds PRN  •  acetaminophen  •  atropine  •  dextrose  •  dextrose  •  glucagon (human recombinant)  •  guaiFENesin-dextromethorphan  •  hydrALAZINE  •  HYDROcodone-acetaminophen  •  insulin lispro **AND** insulin lispro  •  ipratropium-albuterol  •  nitroglycerin  •  ondansetron **OR** ondansetron  •  [COMPLETED] Insert peripheral IV **AND** sodium chloride  •  sodium chloride  •  sodium chloride  •  sodium chloride        Assessment/Plan   Assessment/Plan     Active Hospital Problems    Diagnosis  POA   • **Abnormal nuclear stress test [R94.39]  Unknown   • Unstable angina (CMS/HCC) [I20.0]  Unknown   • Cytokine release syndrome, grade 1 [D89.831]  Unknown   • Acute on chronic congestive heart failure (CMS/HCC) [I50.9]  Yes   • Type 2 diabetes mellitus with hyperglycemia (CMS/HCC) [E11.65]  Yes   • Elevated troponin [R77.8]  Yes       Resolved Hospital Problems   No resolved problems to display.       MEDICAL DECISION MAKING COMPLEXITY BY PROBLEM:       Acute on chronic systolic heart failure:  -TTE 5/7/2021 --> LVEF 35%.  Biatrial enlargement  -Continue Lasix    Abnormal stress test:    -s/p [facility] 5/24/2021--> NIELS to LAD.  Episodes of sinus pause.    -s/p [facility] 5/26/2021--> stent to SVG to RCA       Elevated troponin (POA)  -Cardiology consulted    Acute kidney injury on chronic kidney disease stage III:  -Nephrology consulted    CAD s/p CABG and multiple PCI:  -Continue aspirin, atenolol, and statin    Intermittent atrial fibrillation:   -rate controlled on home atenolol and digoxin (level therapeutic)  -AC with Eliquis on discharge     COPD exacerbation:  -Continue bronchodilators    Diabetes mellitus complicated with hyperglycemia and diabetic neuropathy:  -Hold Metformin during hospitalization  -Pain controlled with gabapentin  -Continue ISS and home NPH    Dementia  -on home Aricept     Depression:  - on home Cymbalta     Essential hypertension:  -moderately controlled on home atenolol, Bumex, Imdur     Chronic pain with opiate dependence:  -On Butler at home    CVA with right-sided residual deficit:  -On aspirin and statin at home    Obstructive sleep apnea:  -May use home BiPAP    Anemia of chronic disease:  -Due to CKD    Deconditioning  -Awaiting pre-CERT for rehab.  -Continue current care plan until pre-CERT is approved.    VTE Prophylaxis -   Mechanical Order History:     None      Pharmalogical Order History:      Ordered     Dose Route Frequency Stop    05/19/21 0430  apixaban (ELIQUIS) tablet 5 mg  Status:  Discontinued      5 mg PO Every 12 Hours Scheduled 05/21/21 1551                  Code Status -   Code Status and Medical Interventions:   Ordered at: 05/19/21 0103     Code Status:    CPR     Medical Interventions (Level of Support Prior to Arrest):    Full       This patient has been examined wearing appropriate Personal  Protective Equipment and discussed with nursing. 06/01/21        Discharge Planning  Awaiting rehab bed.  Monitoring for hypoglycemia.  Walks with a walker at home and uses a wheelchair.        Electronically signed by Kasnadra Ocampo MD, 06/01/21, 17:43 EDT.  [facility] Hospitalist Team

## 2021-06-01 NOTE — PLAN OF CARE
Bed Mobility: Mod-A  Functional Transfers: Mod-A and Assist x 2  Functional Ambulation: steps towards BSR with min-mod A x2 for safety     Grooming: Min-A  ADL Position: supported sitting  ADL Comments: Pt min A for brushing hair secondary to fatigue, pt min A for oral hygiene sitting in BSR     Feeding: set up  ADL Position: supported sitting  ADL Comments: Pt set up for eating sitting in BSR    Assessment: Benson Mclean presents with ADL impairments below baseline abilities which indicate the need for continued skilled intervention while inpatient. Tolerating ADL session today without incident. Pt continues to require 2 people for transfers and functional mobility secondary decreased endurance. Will continue to follow and progress as tolerated.     Plan/Recommendations:   Pt would benefit from Inpatient Rehabilitation placement at discharge from facility.   Pt desires Inpatient Rehabilitation placement at discharge. Pt cooperative; agreeable to therapeutic recommendations and plan of care.

## 2021-06-01 NOTE — PLAN OF CARE
Goal Outcome Evaluation:     Bed mobility - Mod-A supine to sit to eob.  Pt had difficulty sitting at eob initially.  Leaning to the right  Transfers - Mod-A, Assist x 2 and with rolling walker  Ambulation - 5 feet Mod-A, Assist x 2 and with rolling walker      Pt would benefit from Inpatient Rehabilitation placement at discharge from facility   Pt desires Inpatient Rehabilitation placement at discharge. Pt cooperative; agreeable to therapeutic recommendations and plan of care.

## 2021-06-01 NOTE — CASE MANAGEMENT/SOCIAL WORK
Continued Stay Note   Brennan     Patient Name: Benson Mclean  MRN: [id number]  Today's Date: 6/1/2021    Admit Date: 5/18/2021    Discharge Plan     Row Name 06/01/21 1841       Plan    Plan  D/C Plan : [facility] accepted pending precert . PASRR approved .    Plan Comments  Barrier to D/C: Awaiting precert which was reported to have been started on 5/27/21. Disagreement between [facility] and [employer] regarding status of pre-cert. [facility] is reviewing to determine actual status of pre-cert.        Phone communication or documentation only - no physical contact with patient or family.    Danielle Perry, [facility], LSW    Office: [phone]  Cell: [phone]  Fax: [phone]  E-mail: [email or web address]

## 2021-06-01 NOTE — PROGRESS NOTES
: The patient feels better denied any complaints adequate urine output    Vital Signs  Vitals:    06/01/21 0528   BP: 129/55   Pulse: 74   Resp: 22   Temp: 98.7 °F (37.1 °C)   SpO2: 97%     No intake/output data recorded.  I/O last 3 completed shifts:  In: 1200 [P.O.:1200]  Out: 2625 [Urine:2625]      Physical Exam:    General: No acute distress  HEENT:  Normocephalic, Atraumatic, EOMI, PERRLA, NO icterus,  Neck:  Supple, No JVD, No Carotid artery bruit, No lymphadenopathy  Chest: Clear to auscultation bilaterally,   Cardiovascular: Regular rate and rhythm. Positive S1 & S2, No rub, murmur or gallop.  Abdominal: Soft, nontender, no palpable organomegaly, no abdominal bruit, positive bowel sounds  Musculoskeletal: No joint tenderness or swelling, good range of motion, Adequate muscle strength on both sides, no cyanosis, clubbing or edema on lower extremities.  Neuro: Alert oriented x3, CN1 - 12 intact, No focal sensory or motor deficit.      Review of Systems:   CNS: Denied headaches, blurred vision, tingling or numbness in any part of body. No weakness or any impaired speech.  CVS: No chest pain or shortness of breath, No orthopnea or PND or exertional dyspnea,  Pulmonary: Denies shortness of breath, coughs, hematemesis, night sweats or weight loss.  GI: Denies diarrhea, nausea, vomiting. Denies weight loss, hematemesis, melena.  : Denies dysuria, frequency or hesitancy of urination  Vascular: Denies claudication, resting pain, tingling numbness or weakness in any part of the body.  Musculoskeletal: Denies Joint tenderness or pain, denies stiffness in joints, denies fever or weight loss, or skin rashes.    Current Labs  [unfilled]  Lab Results (last 24 hours)     Procedure Component Value Units Date/Time    POC Glucose Once [[id number]]  (Abnormal) Collected: 06/01/21 0713    Specimen: Blood Updated: 06/01/21 0714     Glucose 116 mg/dL      Comment: Serial Number: [id number]:  [id number]       Basic  Metabolic Panel [[id number]]  (Abnormal) Collected: 06/01/21 0237    Specimen: Blood Updated: 06/01/21 0411     Glucose 92 mg/dL      BUN 43 mg/dL      Creatinine 1.55 mg/dL      Sodium 142 mmol/L      Potassium 4.0 mmol/L      Chloride 104 mmol/L      CO2 28.0 mmol/L      Calcium 8.4 mg/dL      eGFR Non African Amer 45 mL/min/1.73      BUN/Creatinine Ratio 27.7     Anion Gap 10.0 mmol/L     Narrative:      GFR Normal >60  Chronic Kidney Disease <60  Kidney Failure <15      Magnesium [[id number]]  (Normal) Collected: 06/01/21 0237    Specimen: Blood Updated: 06/01/21 0411     Magnesium 2.0 mg/dL     POC Glucose Once [[id number]]  (Abnormal) Collected: 05/31/21 1945    Specimen: Blood Updated: 05/31/21 1946     Glucose 145 mg/dL      Comment: Serial Number: [id number]:  [id number]       POC Glucose Once [[id number]]  (Abnormal) Collected: 05/31/21 1602    Specimen: Blood Updated: 05/31/21 1603     Glucose 185 mg/dL      Comment: Serial Number: [id number]:  [id number]           Current Radiology  Imaging Results (Last 24 Hours)     ** No results found for the last 24 hours. **        Current Meds    Current Facility-Administered Medications:   •  acetaminophen (TYLENOL) tablet 650 mg, 650 mg, Oral, Q4H PRN, Yani-Egziabher, Maurilio I, DO  •  apixaban (ELIQUIS) tablet 5 mg, 5 mg, Oral, Q12H, Yani-Egziabher, Maurilio I, DO, 5 mg at 06/01/21 0933  •  aspirin chewable tablet 81 mg, 81 mg, Oral, Daily, Yani-Egziabher, Maurilio I, DO, 81 mg at 06/01/21 0933  •  atenolol (TENORMIN) tablet 100 mg, 100 mg, Oral, Daily, Yani-Egziabher, Maurilio I, DO, 100 mg at 06/01/21 0933  •  atorvastatin (LIPITOR) tablet 40 mg, 40 mg, Oral, Nightly, Yani-Egziabher, Maurilio I, DO, 40 mg at 05/31/21 2023  •  atropine sulfate injection 0.5 mg, 0.5 mg, Intravenous, Q5 Min PRN, Maurilio Boucher DO  •  cholecalciferol (VITAMIN D3) tablet 1,000 Units, 1,000 Units, Oral, Daily, Maurilio Boucher DO, 1,000 Units at 06/01/21 0960  •   clopidogrel (PLAVIX) tablet 75 mg, 75 mg, Oral, Daily, Yani-Egziabher, Maurilio I, DO, 75 mg at 06/01/21 0932  •  dextrose (D50W) 25 g/ 50mL Intravenous Solution 25 g, 25 g, Intravenous, Q15 Min PRN, Yani-Egziabher, Maurilio I, DO, 25 g at 05/29/21 0254  •  dextrose (GLUTOSE) oral gel 15 g, 15 g, Oral, Q15 Min PRN, Yani-Egziabher, Maurilio I, DO, 15 g at 05/20/21 0656  •  digoxin (LANOXIN) tablet 125 mcg, 125 mcg, Oral, Daily, Yani-Egziabher, Maurilio I, DO, 125 mcg at 05/31/21 1236  •  donepezil (ARICEPT) tablet 10 mg, 10 mg, Oral, Nightly, Yani-Egziabher, Maurilio I, DO, 10 mg at 05/31/21 2023  •  DULoxetine (CYMBALTA) DR capsule 60 mg, 60 mg, Oral, Daily, Yani-Egziabher, Maurilio I, DO, 60 mg at 06/01/21 0932  •  furosemide (LASIX) tablet 40 mg, 40 mg, Oral, Daily, Katerina Beyer MD, 40 mg at 06/01/21 0933  •  gabapentin (NEURONTIN) capsule 200 mg, 200 mg, Oral, Q12H, Yani-Egziabher, Maurilio I, DO, 200 mg at 06/01/21 0932  •  glucagon (human recombinant) (GLUCAGEN DIAGNOSTIC) injection 1 mg, 1 mg, Subcutaneous, PRN, Yani-Egziabher, Maurilio I, DO  •  guaiFENesin-dextromethorphan (ROBITUSSIN DM) 100-10 MG/5ML syrup 5 mL, 5 mL, Oral, Q4H PRN, Yani-Egziabher, Maurilio I, DO, 5 mL at 05/29/21 1718  •  hydrALAZINE (APRESOLINE) tablet 50 mg, 50 mg, Oral, BID PRN, Yani-Egziabher, Maurilio I, DO, 50 mg at 05/23/21 6639  •  HYDROcodone-acetaminophen (NORCO) 7.5-325 MG per tablet 1 tablet, 1 tablet, Oral, Q12H PRN, Maurilio Boucher, , 1 tablet at 05/24/21 0921  •  insulin lispro (ADMELOG) injection 0-14 Units, 0-14 Units, Subcutaneous, TID AC, 3 Units at 05/31/21 1741 **AND** insulin lispro (ADMELOG) injection 0-14 Units, 0-14 Units, Subcutaneous, PRN, Maurilio Boucher, DO  •  insulin NPH-insulin regular (humuLIN 70/30,novoLIN 70/30) injection 15 Units, 15 Units, Subcutaneous, BID With Meals, Maurilio Boucher, , 15 Units at 06/01/21 0933  •  ipratropium (ATROVENT) nebulizer solution 0.5 mg, 0.5 mg, Nebulization, 4x Daily - RT,  Yani-Egziabher, Maurilio I, DO, 0.5 mg at 05/31/21 1931  •  ipratropium-albuterol (DUO-NEB) nebulizer solution 3 mL, 3 mL, Nebulization, Q4H PRN, Yani-Egziabher, Maurilio I, DO, 3 mL at 05/21/21 2021  •  isosorbide mononitrate (IMDUR) 24 hr tablet 60 mg, 60 mg, Oral, BID, Yani-Egziabher, Maurilio I, DO, 60 mg at 06/01/21 0932  •  nitroglycerin (NITROSTAT) SL tablet 0.4 mg, 0.4 mg, Sublingual, Q5 Min PRN, Yani-Egziabher, Maurilio I, DO  •  ondansetron (ZOFRAN) tablet 4 mg, 4 mg, Oral, Q6H PRN **OR** ondansetron (ZOFRAN) injection 4 mg, 4 mg, Intravenous, Q6H PRN, Yani-Egziabher, Maurilio I, DO  •  [COMPLETED] Insert peripheral IV, , , Once **AND** sodium chloride 0.9 % flush 10 mL, 10 mL, Intravenous, PRN, Yani-Egziabher, Maurilio I, DO  •  sodium chloride 0.9 % flush 3 mL, 3 mL, Intravenous, Q12H, Yani-Egziabher, Maurilio I, DO, 3 mL at 06/01/21 0933  •  sodium chloride 0.9 % flush 3-10 mL, 3-10 mL, Intravenous, PRN, Yani-Egziabher, Maurilio I, DO  •  sodium chloride 0.9 % infusion 250 mL, 250 mL, Intravenous, Once PRN, Yani-Egziabher, Maurilio I, DO  •  sodium chloride 0.9 % infusion 250 mL, 250 mL, Intravenous, Once PRN, Yani-Egziabher, Maurilio I, DO  •  vitamin B-12 (CYANOCOBALAMIN) tablet 1,000 mcg, 1,000 mcg, Oral, Daily, Yani-Egziabher, Maurilio I, DO, 1,000 mcg at 06/01/21 0936              Patient Active Problem List   Diagnosis   • Diabetes mellitus due to underlying condition without complication, without long-term current use of insulin (CMS/HCC)   • S/P CABG (coronary artery bypass graft)   • Essential hypertension   • Dyslipidemia   • Fall   • Elevated troponin   • Closed fracture of seventh thoracic vertebra (CMS/HCC)   • Cerebrovascular accident (CVA) (CMS/HCC)   • Other headache syndrome   • Right hand weakness   • Neurologic gait dysfunction   • Tobacco user   • Status post coronary artery stent placement   • ANNETTE treated with BiPAP   • Right hemiplegia (CMS/HCC)   • Major depressive disorder, recurrent, mild (CMS/HCC)   •  Immobility syndrome   • Lymphadenopathy, mediastinal   • Hyperlipidemia   • History of cerebrovascular accident   • History of amputation of lesser toe (CMS/HCC)   • Hemiplegia, unspecified affecting right dominant side (CMS/HCC)   • Diabetic neuropathy (CMS/HCC)   • Depressive disorder   • Unspecified dementia with behavioral disturbance (CMS/HCC)   • COVID-19   • Coronary artery disease   • Constipation   • Obesity   • Cognitive communication deficit   • Vitamin D deficiency   • Chronic venous hypertension (idiopathic) with ulcer and inflammation of bilateral lower extremity (CMS/HCC)   • Chronic obstructive pulmonary disease, unspecified (CMS/HCC)   • Chronic foot ulcer (CMS/HCC)   • Chronic left-sided low back pain without sciatica   • Acute congestive heart failure (CMS/HCC)   • Paroxysmal atrial fibrillation with rapid ventricular response (CMS/HCC)   • Arthritis   • Acute renal insufficiency   • Atrial fibrillation with rapid ventricular response (CMS/HCC)   • Acute on chronic congestive heart failure (CMS/HCC)   • Type 2 diabetes mellitus with hyperglycemia (CMS/HCC)   • Abnormal nuclear stress test   • Cytokine release syndrome, grade 1   • Unstable angina (CMS/HCC)   Acute on chronic kidney disease with CKD stage IIIb stable renal function continue diuretic at current dose        Katerina Beyer MD FACP, FASN.  06/01/21  09:45 EDT       Statement Selected

## 2021-06-02 LAB
ANION GAP SERPL CALCULATED.3IONS-SCNC: 12 MMOL/L (ref 5–15)
BUN SERPL-MCNC: 43 MG/DL (ref 8–23)
BUN/CREAT SERPL: 26.9 (ref 7–25)
CALCIUM SPEC-SCNC: 8.3 MG/DL (ref 8.6–10.5)
CHLORIDE SERPL-SCNC: 103 MMOL/L (ref 98–107)
CO2 SERPL-SCNC: 27 MMOL/L (ref 22–29)
CREAT SERPL-MCNC: 1.6 MG/DL (ref 0.76–1.27)
GFR SERPL CREATININE-BSD FRML MDRD: 43 ML/MIN/1.73
GLUCOSE BLDC GLUCOMTR-MCNC: 147 MG/DL (ref 70–105)
GLUCOSE BLDC GLUCOMTR-MCNC: 162 MG/DL (ref 70–105)
GLUCOSE BLDC GLUCOMTR-MCNC: 163 MG/DL (ref 70–105)
GLUCOSE BLDC GLUCOMTR-MCNC: 226 MG/DL (ref 70–105)
GLUCOSE SERPL-MCNC: 135 MG/DL (ref 65–99)
POTASSIUM SERPL-SCNC: 4 MMOL/L (ref 3.5–5.2)
SODIUM SERPL-SCNC: 142 MMOL/L (ref 136–145)

## 2021-06-02 PROCEDURE — 99232 SBSQ HOSP IP/OBS MODERATE 35: CPT | Performed by: INTERNAL MEDICINE

## 2021-06-02 PROCEDURE — 97530 THERAPEUTIC ACTIVITIES: CPT

## 2021-06-02 PROCEDURE — 63710000001 INSULIN ISOPHANE & REGULAR PER 5 UNITS: Performed by: HOSPITALIST

## 2021-06-02 PROCEDURE — 94799 UNLISTED PULMONARY SVC/PX: CPT

## 2021-06-02 PROCEDURE — 80048 BASIC METABOLIC PNL TOTAL CA: CPT | Performed by: INTERNAL MEDICINE

## 2021-06-02 PROCEDURE — 63710000001 INSULIN LISPRO (HUMAN) PER 5 UNITS: Performed by: HOSPITALIST

## 2021-06-02 PROCEDURE — 82962 GLUCOSE BLOOD TEST: CPT

## 2021-06-02 RX ADMIN — HYDROCODONE BITARTRATE AND ACETAMINOPHEN 1 TABLET: 7.5; 325 TABLET ORAL at 20:32

## 2021-06-02 RX ADMIN — IPRATROPIUM BROMIDE 0.5 MG: 0.5 SOLUTION RESPIRATORY (INHALATION) at 06:58

## 2021-06-02 RX ADMIN — ATENOLOL 100 MG: 50 TABLET ORAL at 06:00

## 2021-06-02 RX ADMIN — DONEPEZIL HYDROCHLORIDE 10 MG: 5 TABLET, FILM COATED ORAL at 20:32

## 2021-06-02 RX ADMIN — ISOSORBIDE MONONITRATE 60 MG: 60 TABLET, EXTENDED RELEASE ORAL at 20:33

## 2021-06-02 RX ADMIN — ATORVASTATIN CALCIUM 40 MG: 40 TABLET, FILM COATED ORAL at 20:32

## 2021-06-02 RX ADMIN — INSULIN HUMAN 15 UNITS: 100 INJECTION, SUSPENSION SUBCUTANEOUS at 18:00

## 2021-06-02 RX ADMIN — DIGOXIN 125 MCG: 0.12 TABLET ORAL at 13:36

## 2021-06-02 RX ADMIN — APIXABAN 5 MG: 5 TABLET, FILM COATED ORAL at 20:33

## 2021-06-02 RX ADMIN — INSULIN HUMAN 15 UNITS: 100 INJECTION, SUSPENSION SUBCUTANEOUS at 09:26

## 2021-06-02 RX ADMIN — HYDRALAZINE HYDROCHLORIDE 50 MG: 25 TABLET, FILM COATED ORAL at 18:05

## 2021-06-02 RX ADMIN — FUROSEMIDE 40 MG: 40 TABLET ORAL at 09:25

## 2021-06-02 RX ADMIN — IPRATROPIUM BROMIDE 0.5 MG: 0.5 SOLUTION RESPIRATORY (INHALATION) at 11:10

## 2021-06-02 RX ADMIN — ASPIRIN 81 MG CHEWABLE TABLET 81 MG: 81 TABLET CHEWABLE at 09:25

## 2021-06-02 RX ADMIN — CYANOCOBALAMIN TAB 1000 MCG 1000 MCG: 1000 TAB at 09:25

## 2021-06-02 RX ADMIN — Medication 1000 UNITS: at 09:25

## 2021-06-02 RX ADMIN — Medication 3 ML: at 20:33

## 2021-06-02 RX ADMIN — ISOSORBIDE MONONITRATE 60 MG: 60 TABLET, EXTENDED RELEASE ORAL at 09:25

## 2021-06-02 RX ADMIN — APIXABAN 5 MG: 5 TABLET, FILM COATED ORAL at 09:25

## 2021-06-02 RX ADMIN — CLOPIDOGREL BISULFATE 75 MG: 75 TABLET ORAL at 09:25

## 2021-06-02 RX ADMIN — Medication 3 ML: at 09:26

## 2021-06-02 RX ADMIN — IPRATROPIUM BROMIDE 0.5 MG: 0.5 SOLUTION RESPIRATORY (INHALATION) at 18:29

## 2021-06-02 RX ADMIN — INSULIN LISPRO 5 UNITS: 100 INJECTION, SOLUTION INTRAVENOUS; SUBCUTANEOUS at 13:36

## 2021-06-02 RX ADMIN — DULOXETINE 60 MG: 30 CAPSULE, DELAYED RELEASE ORAL at 09:25

## 2021-06-02 RX ADMIN — INSULIN LISPRO 3 UNITS: 100 INJECTION, SOLUTION INTRAVENOUS; SUBCUTANEOUS at 18:05

## 2021-06-02 RX ADMIN — GABAPENTIN 200 MG: 100 CAPSULE ORAL at 20:32

## 2021-06-02 RX ADMIN — GABAPENTIN 200 MG: 100 CAPSULE ORAL at 09:25

## 2021-06-02 NOTE — THERAPY TREATMENT NOTE
Subjective: Pt agreeable to therapeutic plan of care. States he wants to get stronger and get out of bed but feels weak.     Objective:     Bed mobility - Max-A   Able to achieve sitting at edge of bed with max A of 1 however with immediate L sided and posterior lean. Max A for sitting balance to achieve midline/ Able to assist pt to reach for rail at foot of bed to assist with achieving midline but not able to maintain.   Pt fatigues quickly in sitting and has loss of balance that he is unable to correct without mod-max A.   Pt had unplanned return to supine due to fatigue and required significant adjustment in bed to achieve appropriate positioning after this. Required trendelenberg position and max A but pt was able to assist with scooting superiorly in bed with bed rails. Left pt in chair position since he was unable to transfer to chair.   Transfers - Max-A   Attempted STS transfer x 2 with max A but unable to achieve due to poor postural stability and decreased strength.   Ambulation - 0 feet N/A or Not attempted.    Pain: 0 VAS  Education: Provided education on importance of mobility and skilled verbal / tactile cueing throughout intervention.     Assessment: Benson Mclean presented with worsening dyspnea s/p cardiac stent placement 5/24 and 5/26. PMH CVA with resultant R sided weakness. On admission, and up to last week pt able to ambulatr with RW up to 50' although he reports that he uses w/c for mobility at home. This date he required max A for sitting balance and not able to achieve transfer with max A. Pt has experienced a decline in functional mobility over this prolonged hospitalization. Will continue to attempt to treat 5 days a week to address this while precert is awaited. Will continue to follow and progress as tolerated.     Plan/Recommendations:   Pt would benefit from Inpatient Rehabilitation placement at discharge from facility and requires no DME at discharge.   Pt desires Inpatient  Rehabilitation placement at discharge. Pt cooperative; agreeable to therapeutic recommendations and plan of care.     Basic Mobility 6-click:  Rollin = Total, A lot = 2, A little = 3; 4 = None  Supine>Sit:   1 = Total, A lot = 2, A little = 3; 4 = None   Sit>Stand with arms:  1 = Total, A lot = 2, A little = 3; 4 = None  Bed>Chair:   1 = Total, A lot = 2, A little = 3; 4 = None  Ambulate in room:  1 = Total, A lot = 2, A little = 3; 4 = None  3-5 Steps with railin = Total, A lot = 2, A little = 3; 4 = None  Score: 8      Post-Tx Position: Supine with HOB Elevated, Alarms activated and Call light and personal items within reach in chair position  PPE: gloves, surgical mask, eyewear protection

## 2021-06-02 NOTE — PLAN OF CARE
Assessment: Benson Mclean presented with worsening dyspnea s/p cardiac stent placement 5/24 and 5/26. PMH CVA with resultant R sided weakness. On admission, and up to last week pt able to ambulatr with RW up to 50' although he reports that he uses w/c for mobility at home. This date he required max A for sitting balance and not able to achieve transfer with max A. Pt has experienced a decline in functional mobility over this prolonged hospitalization. Will continue to attempt to treat 5 days a week to address this while precert is awaited. Will continue to follow and progress as tolerated.

## 2021-06-02 NOTE — PROGRESS NOTES
Referring Provider: Kasandra Ocampo MD    Reason for follow-up:  Shortness of breath  Status post CABG  Status post stent       Patient Care Team:  Samantha Zabala APRN as PCP - General  Samantha Zabala APRN as PCP - Family Medicine  Jose G Rm MD as Consulting Physician (Cardiology)    Subjective .  Feeling better     ROS    Since I have last seen, the patient has been without any chest discomfort ,shortness of breath, palpitations, dizziness or syncope.  Denies having any headache ,abdominal pain ,nausea, vomiting , diarrhea constipation, loss of weight or loss of appetite.  Denies having any excessive bruising ,hematuria or blood in the stool.    Review of all systems negative except as indicated    History  Past Medical History:   Diagnosis Date   • Appetite loss    • Brain bleed (CMS/HCC)    • Carpal tunnel syndrome on left     carpal tunnel on left hand   • Diabetic neuropathy (CMS/HCC)    • DM2 (diabetes mellitus, type 2) (CMS/HCC)    • History of angina    • Hyperlipidemia    • Hypogonadism in male    • Obesity    • Sleep apnea    • Stroke (CMS/HCC)    • Unsteady gait        Past Surgical History:   Procedure Laterality Date   • ANGIOPLASTY      X2   • APPENDECTOMY     • CARDIAC CATHETERIZATION  11/13/2015   • CARDIAC CATHETERIZATION N/A 5/24/2021    Procedure: Left Heart Cath and coronary angiogram;  Surgeon: Jose G Rm MD;  Location: [facility] CATH INVASIVE LOCATION;  Service: Cardiovascular;  Laterality: N/A;   • CARDIAC CATHETERIZATION  5/24/2021    Procedure: Saphenous Vein Graft;  Surgeon: Jos eG Rm MD;  Location: [facility] CATH INVASIVE LOCATION;  Service: Cardiovascular;;   • CARDIAC CATHETERIZATION N/A 5/24/2021    Procedure: Percutaneous Coronary Intervention;  Surgeon: Bernabe Zambrano MD;  Location: [facility] CATH INVASIVE LOCATION;  Service: Cardiology;  Laterality: N/A;   • CARDIAC CATHETERIZATION N/A 5/24/2021    Procedure: Stent NIELS coronary;  Surgeon: Bernabe Zambrano MD;  Location:   [facility] CATH INVASIVE LOCATION;  Service: Cardiology;  Laterality: N/A;   • CARDIAC CATHETERIZATION N/A 5/26/2021    Procedure: Percutaneous Coronary Intervention;  Surgeon: Bernabe Zambrano MD;  Location: [facility] CATH INVASIVE LOCATION;  Service: Cardiovascular;  Laterality: N/A;   • CARDIAC CATHETERIZATION N/A 5/26/2021    Procedure: Stent NIELS bypass graft;  Surgeon: Bernabe Zambrano MD;  Location: [facility] CATH INVASIVE LOCATION;  Service: Cardiovascular;  Laterality: N/A;   • CARDIAC ELECTROPHYSIOLOGY PROCEDURE N/A 5/24/2021    Procedure: Temporary Pacemaker;  Surgeon: Bernabe Zambrano MD;  Location:  [facility] CATH INVASIVE LOCATION;  Service: Cardiology;  Laterality: N/A;   • CARDIAC ELECTROPHYSIOLOGY PROCEDURE N/A 5/26/2021    Procedure: Temporary Pacemaker;  Surgeon: Bernabe Zambrano MD;  Location: [facility] CATH INVASIVE LOCATION;  Service: Cardiovascular;  Laterality: N/A;   • CARPAL TUNNEL RELEASE Left 04/29/2018    carpal tunnel- lt hand// other hand surgeries    • CATARACT EXTRACTION, BILATERAL  2002    Dr. Lux Acosta   • CHOLECYSTECTOMY     • COLON RESECTION  2005   • CORONARY ANGIOPLASTY     • CORONARY ANGIOPLASTY WITH STENT PLACEMENT  11/13/2015    PTCA stent to proximal in stent and mid to distal lad   • CORONARY ANGIOPLASTY WITH STENT PLACEMENT  09/16/2016    PTCA stent to mid lad and stent to vein graft to marginal   • CORONARY ARTERY BYPASS GRAFT  2005    @ [facility]   • CYST REMOVAL      cyst removed from scrotum   • FOOT SURGERY Right 07/17/2018   • FOOT SURGERY Left 02/04/2019   • TOE AMPUTATION Right     right toe removed D/T infected cut that went to the bone       Family History   Problem Relation Age of Onset   • Heart disease Mother    • Heart disease Father    • Diabetes Sister    • Heart disease Sister    • Diabetes Brother    • Mental illness Brother        Social History     Tobacco Use   • Smoking status: Former Smoker     Packs/day: 1.00     Types: Cigarettes   • Smokeless tobacco: Never Used   Vaping Use   •  Vaping Use: Never used   Substance Use Topics   • Alcohol use: No   • Drug use: Yes     Frequency: 1.0 times per week     Types: Marijuana     Comment: socially        Medications Prior to Admission   Medication Sig Dispense Refill Last Dose   • apixaban (ELIQUIS) 5 MG tablet tablet Take 1 tablet by mouth Every 12 (Twelve) Hours. Indications: Atrial Fibrillation 60 tablet 0    • aspirin 81 MG chewable tablet Chew 81 mg Daily.      • atenolol (TENORMIN) 100 MG tablet Take 100 mg by mouth Daily.      • atorvastatin (LIPITOR) 40 MG tablet Take 40 mg by mouth Every Night.      • bumetanide (BUMEX) 1 MG tablet Take 1 mg by mouth Every Morning.      • cholecalciferol (VITAMIN D3) 25 MCG (1000 UT) tablet Take 1,000 Units by mouth Daily.      • digoxin (LANOXIN) 125 MCG tablet Take 125 mcg by mouth Daily. In the afternoon      • donepezil (ARICEPT) 10 MG tablet Take 10 mg by mouth Every Night.      • DULoxetine (CYMBALTA) 60 MG capsule Take 60 mg by mouth Daily.      • gabapentin (NEURONTIN) 100 MG capsule Take 200 mg by mouth 2 (two) times a day.      • Glucagon, rDNA, (Glucagon Emergency) 1 MG kit Inject 1 mg as directed 1 (One) Time As Needed (hypoglycemia).      • hydrALAZINE (APRESOLINE) 50 MG tablet Take 50 mg by mouth 2 (two) times a day. Hold for SBP <110      • insulin NPH-insulin regular (humuLIN 70/30,novoLIN 70/30) (70-30) 100 UNIT/ML injection Inject 30 Units under the skin into the appropriate area as directed 2 (Two) Times a Day With Meals.      • isosorbide mononitrate (IMDUR) 60 MG 24 hr tablet Take 60 mg by mouth 2 (Two) Times a Day.      • metFORMIN ER (GLUCOPHAGE-XR) 500 MG 24 hr tablet Take 500 mg by mouth 2 (two) times a day.      • Omega-3 Fatty Acids (fish oil) 1000 MG capsule capsule Take 1,000 mg by mouth Daily.      • Skin Protectants, Misc. (eucerin) cream Apply 1 application topically to the appropriate area as directed 2 (Two) Times a Day As Needed for Dry Skin. To upper & lower extremities       • tiotropium (Spiriva HandiHaler) 18 MCG per inhalation capsule Place 1 capsule into inhaler and inhale Daily. 30 capsule 1    • vitamin B-12 (CYANOCOBALAMIN) 1000 MCG tablet Take 1,000 mcg by mouth Daily.      • [DISCONTINUED] HYDROcodone-acetaminophen (NORCO) 7.5-325 MG per tablet Take 1 tablet by mouth Every 12 (Twelve) Hours As Needed for Mild Pain  or Moderate Pain .          Allergies  Codeine    Scheduled Meds:apixaban, 5 mg, Oral, Q12H  aspirin, 81 mg, Oral, Daily  atenolol, 100 mg, Oral, Daily  atorvastatin, 40 mg, Oral, Nightly  cholecalciferol, 1,000 Units, Oral, Daily  clopidogrel, 75 mg, Oral, Daily  digoxin, 125 mcg, Oral, Daily  donepezil, 10 mg, Oral, Nightly  DULoxetine, 60 mg, Oral, Daily  furosemide, 40 mg, Oral, Daily  gabapentin, 200 mg, Oral, Q12H  insulin lispro, 0-14 Units, Subcutaneous, TID AC  insulin NPH-insulin regular, 15 Units, Subcutaneous, BID With Meals  ipratropium, 0.5 mg, Nebulization, 4x Daily - RT  isosorbide mononitrate, 60 mg, Oral, BID  sodium chloride, 3 mL, Intravenous, Q12H  vitamin B-12, 1,000 mcg, Oral, Daily      Continuous Infusions:   PRN Meds:.•  acetaminophen  •  atropine  •  dextrose  •  dextrose  •  glucagon (human recombinant)  •  guaiFENesin-dextromethorphan  •  hydrALAZINE  •  HYDROcodone-acetaminophen  •  insulin lispro **AND** insulin lispro  •  ipratropium-albuterol  •  nitroglycerin  •  ondansetron **OR** ondansetron  •  [COMPLETED] Insert peripheral IV **AND** sodium chloride  •  sodium chloride  •  sodium chloride  •  sodium chloride    Objective     VITAL SIGNS  Vitals:    06/01/21 2103 06/01/21 2104 06/02/21 0201 06/02/21 0506   BP: 154/66  134/56 160/63   BP Location: Left arm  Left arm Left arm   Patient Position: Lying  Lying Lying   Pulse: 82   57   Resp: 20 18 16 18   Temp: 98.1 °F (36.7 °C)  97.6 °F (36.4 °C) 98 °F (36.7 °C)   TempSrc: Oral  Oral Oral   SpO2: 95% 96% 100% 96%   Weight:    118 kg (260 lb 2.3 oz)   Height:           Flowsheet Rows  "     First Filed Value   Admission Height  175.3 cm (69\") Documented at 05/18/2021 2219   Admission Weight  122 kg (270 lb) Documented at 05/18/2021 2219            Intake/Output Summary (Last 24 hours) at 6/2/2021 0644  Last data filed at 6/1/2021 2300  Gross per 24 hour   Intake 960 ml   Output 400 ml   Net 560 ml        TELEMETRY: Sinus rhythm    Physical Exam:  The patient is alert, oriented and in no distress.  Vital signs as noted above.  Exogenous obesity.  Head and neck revealed no carotid bruits or jugular venous distention.  No thyromegaly or lymphadenopathy is present  Lungs clear.  No wheezing.  Breath sounds are normal bilaterally.  Heart normal first and second heart sounds.  No murmur. No precordial rub is present.  No gallop is present.  Abdomen soft and nontender.  No organomegaly is present.  Extremities with good peripheral pulses.  2+ edema.  Cardiac cath site looks normal.  Skin warm and dry.  Musculoskeletal system is grossly normal  CNS grossly normal      Results Review:   I reviewed the patient's new clinical results.  Lab Results (last 24 hours)     Procedure Component Value Units Date/Time    Basic Metabolic Panel [[id number]]  (Abnormal) Collected: 06/02/21 0226    Specimen: Blood Updated: 06/02/21 0407     Glucose 135 mg/dL      BUN 43 mg/dL      Creatinine 1.60 mg/dL      Sodium 142 mmol/L      Potassium 4.0 mmol/L      Chloride 103 mmol/L      CO2 27.0 mmol/L      Calcium 8.3 mg/dL      eGFR Non African Amer 43 mL/min/1.73      BUN/Creatinine Ratio 26.9     Anion Gap 12.0 mmol/L     Narrative:      GFR Normal >60  Chronic Kidney Disease <60  Kidney Failure <15      POC Glucose Once [[id number]]  (Abnormal) Collected: 06/01/21 2041    Specimen: Blood Updated: 06/01/21 2042     Glucose 135 mg/dL      Comment: Serial Number: [id number]:  [id number]       POC Glucose Once [[id number]]  (Abnormal) Collected: 06/01/21 1659    Specimen: Blood Updated: 06/01/21 1700     Glucose 196 mg/dL  "     Comment: Serial Number: [id number]:  [id number]       POC Glucose Once [[id number]]  (Abnormal) Collected: 06/01/21 1215    Specimen: Blood Updated: 06/01/21 1217     Glucose 183 mg/dL      Comment: Serial Number: [id number]:  [id number]             Imaging Results (Last 24 Hours)     ** No results found for the last 24 hours. **      LAB RESULTS (LAST 7 DAYS)    CBC  Results from last 7 days   Lab Units 05/28/21  0328   WBC 10*3/mm3 6.70   RBC 10*6/mm3 2.80*   HEMOGLOBIN g/dL 8.4*   HEMATOCRIT % 25.4*   MCV fL 90.6   PLATELETS 10*3/mm3 241       BMP  Results from last 7 days   Lab Units 06/02/21 0226 06/01/21 0237 05/31/21 0205 05/29/21 0147 05/28/21 0328 05/27/21  1139   SODIUM mmol/L 142 142 141 142 143 141   POTASSIUM mmol/L 4.0 4.0 4.2 4.0 4.0 4.3   CHLORIDE mmol/L 103 104 101 106 105 104   CO2 mmol/L 27.0 28.0 27.0 26.0 26.0 26.0   BUN mg/dL 43* 43* 44* 44* 46* 45*   CREATININE mg/dL 1.60* 1.55* 1.69* 1.56* 1.56* 1.68*   GLUCOSE mg/dL 135* 92 165* 55* 52* 177*   MAGNESIUM mg/dL  --  2.0 2.1  --   --   --        CMP   Results from last 7 days   Lab Units 06/02/21 0226 06/01/21 0237 05/31/21 0205 05/29/21 0147 05/28/21  0328 05/27/21  1139   SODIUM mmol/L 142 142 141 142 143 141   POTASSIUM mmol/L 4.0 4.0 4.2 4.0 4.0 4.3   CHLORIDE mmol/L 103 104 101 106 105 104   CO2 mmol/L 27.0 28.0 27.0 26.0 26.0 26.0   BUN mg/dL 43* 43* 44* 44* 46* 45*   CREATININE mg/dL 1.60* 1.55* 1.69* 1.56* 1.56* 1.68*   GLUCOSE mg/dL 135* 92 165* 55* 52* 177*         BNP        TROPONIN        CoAg        Creatinine Clearance  Estimated Creatinine Clearance: 54.4 mL/min (A) (by C-G formula based on SCr of 1.6 mg/dL (H)).    ABG        Radiology  No radiology results for the last day            EKG          I personally viewed and interpreted the patient's EKG/Telemetry data: Sinus rhythm nonspecific ST-T wave changes    ECHOCARDIOGRAM:    Results for orders placed during the hospital encounter of  05/06/21    Adult Transthoracic Echo Complete w/ Color, Spectral and Contrast if necessary per protocol    Interpretation Summary  Technically difficult study due to poor acoustic windows, not all left ventricular walls are well visualized therefore Lumason contrast was given to assess LV function..  LVE with basal inferior wall hypokinesis and septal dyskinesis, estimated LV ejection fraction of 35%  Normal RV size  Biatrial enlargement seen.  Aortic valve, mitral valve, tricuspid valve appears structurally normal, no significant regurgitation seen.  No pericardial effusion seen.  Proximal aorta appears normal in size.          STRESS MYOVIEW:    Cardiolite (Tc-99m Sestamibi) stress test    CARDIAC CATHETERIZATION:            OTHER:         Assessment/Plan     Principal Problem:    Abnormal nuclear stress test  Active Problems:    Elevated troponin    Acute on chronic congestive heart failure (CMS/HCC)    Type 2 diabetes mellitus with hyperglycemia (CMS/HCC)    Cytokine release syndrome, grade 1    Unstable angina (CMS/HCC)      [[[[[[[[[[[[[[[[[[[[[[[[[    Impression  ==============  -Acute on chronic congestive heart failure.  Recent echocardiogram showed left ventricle dysfunction with ejection fraction of 35%.     -History of right-sided weakness with left MCA territory stroke.-Improved      -status post CABG 2005. status post stent to LAD 2009    Status post stent to LAD 11/13/2015  Status post stent to mid LAD and SVG to marginal branch 09/16/2016.  Status post stent to mid LAD 5/24/2021  Status post stent to SVG to RCA 5/26/2021    Cardiac catheterization 5/24/2021 revealed  Left ventricle angiogram was not performed due to pre-existing renal dysfunction.  Left main coronary artery normal.  Left anterior descending artery has mid segment lengthy 90% disease within the previously placed stent.  Distal left into descending artery has diffuse disease.  Circumflex coronary artery is totally occluded.   (Chronic)  Right coronary artery is chronically occluded.  SVG to marginal branch (jump graft to OM1 and OM 2) is totally occluded (new finding compared to 2015.  SVG to PDA has distal radiolucency.  However no definite obstructive disease is present.         -status post myocardial infarction 2000 and 2002 .      -history of intermittent atrial fibrillation-maintaining sinus rhythm     Transesophageal echocardiogram 11/30/2020.  Biatrial enlargement.  Smoking effect in the left atrium and left ventricle and left atrial appendage.  Mild mitral and aortic regurgitation.  Left ventricle enlargement with diffuse hypocontractility with ejection fraction of 35 to 40%.     Echocardiogram 11/27/2020 revealed left atrial enlargement left ventricle dysfunction with ejection fraction of 40%.  Negative bubble study.      -diabetes hypertension and sleep apnea in history of renal dysfunction .  Recent BUN/creatinine 38/1.36     -status post colon surgery (partial colectomy) appendectomy cholecystectomy right 4th toe removal and carpal tunnel surgery      -continued smoking 1 pack per day -abstinence from smoking      -allergy to codeine.  ============   Plan  ================  Status post CABG  Abnormal stress Cardiolite test.    Cardiac catheterization 5/24/2021 revealed  Patient had intervention to left anterior descending artery.  5/24/2021  Spontaneous prolonged episode of asystole is of concern.  Patient had recovered without any intervention or CPR.  We will closely observe for any bradycardia arrhythmias and patient may need pacemaker/ICD implantation.    Status post stent to LAD 5/24/2021.  Status post stent to SVG to RCA 5/26/2021    Left ventricle dysfunction with ejection fraction of 35%.  Patient has acute on chronic congestive heart failure. Compensated at this time.     Paroxysmal atrial fibrillation.  Patient is mostly in sinus rhythm.    Renal dysfunction stable  BUN 51 creatinine  2.0.  54/2.14  51/2.01  52/1.79  48/1.7-5/23/2021  48/1.79-5/25/2021  48/1.84-5/26/2021  46/1.56-5/28/2021  44/1.56-5/29/2021  44/1.69-5/31/2021  43/1.55-6/1/2021  43/1.6-6/2/2021     Anemia  Hemoglobin 8.8  8.5  8.4-5/28/2021     Anticoagulation  Patient is on Eliquis.    Medications were reviewed and updated.    Waiting for transfer to rehab.    Follow-up in the office in 2 weeks.    Current medications include Eliquis aspirin atenolol atorvastatin Plavix Lanoxin isosorbide.    Follow-up labs ordered.    Further plan will depend on patient's progress.   ]]]]]]]]]]]]]]]]]]]]]]              Jose G Rm MD  06/02/21  06:44 EDT

## 2021-06-02 NOTE — DISCHARGE PLACEMENT REQUEST
"**Please see updated physician notes, consult notes, and therapy notes. Please confirm receipt.    Danielle Perry, ABELINOW, LSW    Office: [phone]  Cell: [phone]  Fax: [phone]  E-mail: [email or web address]**    Benson Tobin (70 y.o. Male)     Date of Birth Social Security Number Address Home Phone MRN    1950  [address] [place] [address] [place] [address] [phone] [id number]    [facility]          [facility]        Admission Date Admission Type Admitting Provider Attending Provider Department, Room/Bed    5/18/21 Emergency Kasandra Ocampo MD Lackey, Diana, MD [facility], 2109/1    Discharge Date Discharge Disposition Discharge Destination                       Attending Provider: Kasandra Ocampo MD    Allergies: Codeine    Isolation: None   Infection: None   Code Status: CPR    Ht: 175.3 cm (69.02\")   Wt: 118 kg (260 lb 2.3 oz)    Admission Cmt: None   Principal Problem: Abnormal nuclear stress test [R94.39] More...                 Active Insurance as of 5/18/2021     Primary Coverage     Payor Plan Insurance Group Employer/Plan Group    HUMANA MEDICARE REPLACEMENT HUMANA MEDICARE REPLACEMENT [id number]     Payor Plan Address Payor Plan Phone Number Payor Plan Fax Number Effective Dates    [address] [address] [phone]  1/1/2018 - None Entered    [facility] [id number]       Subscriber Name Subscriber Birth Date Member ID       BENSON TOBIN 1950 [id number]           Secondary Coverage     Payor Plan Insurance Group Employer/Plan Group    [place]      Payor Plan Address Payor Plan Phone Number Payor Plan Fax Number Effective Dates    [address]   5/1/2021 - None Entered    [place] [address]       Subscriber Name Subscriber Birth Date Member ID       BENSON TOBIN 1950 [id number]                 Emergency Contacts      (Rel.) Home Phone Work Phone Mobile Phone    " SHAUNA TOBIN (Spouse) [phone] -- [phone]               Physician Progress Notes (most recent note)      Jose G Rm MD at 06/02/21 0663          Referring Provider: Kasandra Ocampo MD    Reason for follow-up:  Shortness of breath  Status post CABG  Status post stent       Patient Care Team:  Samantha Zabala APRN as PCP - General  Samantha Zabala APRN as PCP - Family Medicine  Jose G Rm MD as Consulting Physician (Cardiology)    Subjective .  Feeling better     ROS    Since I have last seen, the patient has been without any chest discomfort ,shortness of breath, palpitations, dizziness or syncope.  Denies having any headache ,abdominal pain ,nausea, vomiting , diarrhea constipation, loss of weight or loss of appetite.  Denies having any excessive bruising ,hematuria or blood in the stool.    Review of all systems negative except as indicated    History  Past Medical History:   Diagnosis Date   • Appetite loss    • Brain bleed (CMS/HCC)    • Carpal tunnel syndrome on left     carpal tunnel on left hand   • Diabetic neuropathy (CMS/HCC)    • DM2 (diabetes mellitus, type 2) (CMS/HCC)    • History of angina    • Hyperlipidemia    • Hypogonadism in male    • Obesity    • Sleep apnea    • Stroke (CMS/HCC)    • Unsteady gait        Past Surgical History:   Procedure Laterality Date   • ANGIOPLASTY      X2   • APPENDECTOMY     • CARDIAC CATHETERIZATION  11/13/2015   • CARDIAC CATHETERIZATION N/A 5/24/2021    Procedure: Left Heart Cath and coronary angiogram;  Surgeon: Jose G Rm MD;  Location: [facility] CATH INVASIVE LOCATION;  Service: Cardiovascular;  Laterality: N/A;   • CARDIAC CATHETERIZATION  5/24/2021    Procedure: Saphenous Vein Graft;  Surgeon: Jose G Rm MD;  Location: [facility] CATH INVASIVE LOCATION;  Service: Cardiovascular;;   • CARDIAC CATHETERIZATION N/A 5/24/2021    Procedure: Percutaneous Coronary Intervention;  Surgeon: Bernabe Zambrano MD;  Location: [facility] CATH INVASIVE  LOCATION;  Service: Cardiology;  Laterality: N/A;   • CARDIAC CATHETERIZATION N/A 5/24/2021    Procedure: Stent NIELS coronary;  Surgeon: Bernabe Zambrano MD;  Location:  [facility] CATH INVASIVE LOCATION;  Service: Cardiology;  Laterality: N/A;   • CARDIAC CATHETERIZATION N/A 5/26/2021    Procedure: Percutaneous Coronary Intervention;  Surgeon: Bernabe Zambrano MD;  Location:  [facility] CATH INVASIVE LOCATION;  Service: Cardiovascular;  Laterality: N/A;   • CARDIAC CATHETERIZATION N/A 5/26/2021    Procedure: Stent NIELS bypass graft;  Surgeon: Bernabe Zambrano MD;  Location:  [facility] CATH INVASIVE LOCATION;  Service: Cardiovascular;  Laterality: N/A;   • CARDIAC ELECTROPHYSIOLOGY PROCEDURE N/A 5/24/2021    Procedure: Temporary Pacemaker;  Surgeon: Bernabe Zambrano MD;  Location:  [facility] CATH INVASIVE LOCATION;  Service: Cardiology;  Laterality: N/A;   • CARDIAC ELECTROPHYSIOLOGY PROCEDURE N/A 5/26/2021    Procedure: Temporary Pacemaker;  Surgeon: Bernabe Zambrano MD;  Location: [facility] CATH INVASIVE LOCATION;  Service: Cardiovascular;  Laterality: N/A;   • CARPAL TUNNEL RELEASE Left 04/29/2018    carpal tunnel- lt hand// other hand surgeries    • CATARACT EXTRACTION, BILATERAL  2002    Dr. Lux Acosta   • CHOLECYSTECTOMY     • COLON RESECTION  2005   • CORONARY ANGIOPLASTY     • CORONARY ANGIOPLASTY WITH STENT PLACEMENT  11/13/2015    PTCA stent to proximal in stent and mid to distal lad   • CORONARY ANGIOPLASTY WITH STENT PLACEMENT  09/16/2016    PTCA stent to mid lad and stent to vein graft to marginal   • CORONARY ARTERY BYPASS GRAFT  2005    @ [facility]   • CYST REMOVAL      cyst removed from scrotum   • FOOT SURGERY Right 07/17/2018   • FOOT SURGERY Left 02/04/2019   • TOE AMPUTATION Right     right toe removed D/T infected cut that went to the bone       Family History   Problem Relation Age of Onset   • Heart disease Mother    • Heart disease Father    • Diabetes Sister    • Heart disease Sister    • Diabetes Brother    • Mental illness  Brother        Social History     Tobacco Use   • Smoking status: Former Smoker     Packs/day: 1.00     Types: Cigarettes   • Smokeless tobacco: Never Used   Vaping Use   • Vaping Use: Never used   Substance Use Topics   • Alcohol use: No   • Drug use: Yes     Frequency: 1.0 times per week     Types: Marijuana     Comment: socially        Medications Prior to Admission   Medication Sig Dispense Refill Last Dose   • apixaban (ELIQUIS) 5 MG tablet tablet Take 1 tablet by mouth Every 12 (Twelve) Hours. Indications: Atrial Fibrillation 60 tablet 0    • aspirin 81 MG chewable tablet Chew 81 mg Daily.      • atenolol (TENORMIN) 100 MG tablet Take 100 mg by mouth Daily.      • atorvastatin (LIPITOR) 40 MG tablet Take 40 mg by mouth Every Night.      • bumetanide (BUMEX) 1 MG tablet Take 1 mg by mouth Every Morning.      • cholecalciferol (VITAMIN D3) 25 MCG (1000 UT) tablet Take 1,000 Units by mouth Daily.      • digoxin (LANOXIN) 125 MCG tablet Take 125 mcg by mouth Daily. In the afternoon      • donepezil (ARICEPT) 10 MG tablet Take 10 mg by mouth Every Night.      • DULoxetine (CYMBALTA) 60 MG capsule Take 60 mg by mouth Daily.      • gabapentin (NEURONTIN) 100 MG capsule Take 200 mg by mouth 2 (two) times a day.      • Glucagon, rDNA, (Glucagon Emergency) 1 MG kit Inject 1 mg as directed 1 (One) Time As Needed (hypoglycemia).      • hydrALAZINE (APRESOLINE) 50 MG tablet Take 50 mg by mouth 2 (two) times a day. Hold for SBP <110      • insulin NPH-insulin regular (humuLIN 70/30,novoLIN 70/30) (70-30) 100 UNIT/ML injection Inject 30 Units under the skin into the appropriate area as directed 2 (Two) Times a Day With Meals.      • isosorbide mononitrate (IMDUR) 60 MG 24 hr tablet Take 60 mg by mouth 2 (Two) Times a Day.      • metFORMIN ER (GLUCOPHAGE-XR) 500 MG 24 hr tablet Take 500 mg by mouth 2 (two) times a day.      • Omega-3 Fatty Acids (fish oil) 1000 MG capsule capsule Take 1,000 mg by mouth Daily.      • Skin  Protectants, Misc. (eucerin) cream Apply 1 application topically to the appropriate area as directed 2 (Two) Times a Day As Needed for Dry Skin. To upper & lower extremities      • tiotropium (Spiriva HandiHaler) 18 MCG per inhalation capsule Place 1 capsule into inhaler and inhale Daily. 30 capsule 1    • vitamin B-12 (CYANOCOBALAMIN) 1000 MCG tablet Take 1,000 mcg by mouth Daily.      • [DISCONTINUED] HYDROcodone-acetaminophen (NORCO) 7.5-325 MG per tablet Take 1 tablet by mouth Every 12 (Twelve) Hours As Needed for Mild Pain  or Moderate Pain .          Allergies  Codeine    Scheduled Meds:apixaban, 5 mg, Oral, Q12H  aspirin, 81 mg, Oral, Daily  atenolol, 100 mg, Oral, Daily  atorvastatin, 40 mg, Oral, Nightly  cholecalciferol, 1,000 Units, Oral, Daily  clopidogrel, 75 mg, Oral, Daily  digoxin, 125 mcg, Oral, Daily  donepezil, 10 mg, Oral, Nightly  DULoxetine, 60 mg, Oral, Daily  furosemide, 40 mg, Oral, Daily  gabapentin, 200 mg, Oral, Q12H  insulin lispro, 0-14 Units, Subcutaneous, TID AC  insulin NPH-insulin regular, 15 Units, Subcutaneous, BID With Meals  ipratropium, 0.5 mg, Nebulization, 4x Daily - RT  isosorbide mononitrate, 60 mg, Oral, BID  sodium chloride, 3 mL, Intravenous, Q12H  vitamin B-12, 1,000 mcg, Oral, Daily      Continuous Infusions:   PRN Meds:.•  acetaminophen  •  atropine  •  dextrose  •  dextrose  •  glucagon (human recombinant)  •  guaiFENesin-dextromethorphan  •  hydrALAZINE  •  HYDROcodone-acetaminophen  •  insulin lispro **AND** insulin lispro  •  ipratropium-albuterol  •  nitroglycerin  •  ondansetron **OR** ondansetron  •  [COMPLETED] Insert peripheral IV **AND** sodium chloride  •  sodium chloride  •  sodium chloride  •  sodium chloride    Objective     VITAL SIGNS  Vitals:    06/01/21 2103 06/01/21 2104 06/02/21 0201 06/02/21 0506   BP: 154/66  134/56 160/63   BP Location: Left arm  Left arm Left arm   Patient Position: Lying  Lying Lying   Pulse: 82   57   Resp: 20 18 16 18  "  Temp: 98.1 °F (36.7 °C)  97.6 °F (36.4 °C) 98 °F (36.7 °C)   TempSrc: Oral  Oral Oral   SpO2: 95% 96% 100% 96%   Weight:    118 kg (260 lb 2.3 oz)   Height:           Flowsheet Rows      First Filed Value   Admission Height  175.3 cm (69\") Documented at 05/18/2021 2219   Admission Weight  122 kg (270 lb) Documented at 05/18/2021 2219            Intake/Output Summary (Last 24 hours) at 6/2/2021 0644  Last data filed at 6/1/2021 2300  Gross per 24 hour   Intake 960 ml   Output 400 ml   Net 560 ml        TELEMETRY: Sinus rhythm    Physical Exam:  The patient is alert, oriented and in no distress.  Vital signs as noted above.  Exogenous obesity.  Head and neck revealed no carotid bruits or jugular venous distention.  No thyromegaly or lymphadenopathy is present  Lungs clear.  No wheezing.  Breath sounds are normal bilaterally.  Heart normal first and second heart sounds.  No murmur. No precordial rub is present.  No gallop is present.  Abdomen soft and nontender.  No organomegaly is present.  Extremities with good peripheral pulses.  2+ edema.  Cardiac cath site looks normal.  Skin warm and dry.  Musculoskeletal system is grossly normal  CNS grossly normal      Results Review:   I reviewed the patient's new clinical results.  Lab Results (last 24 hours)     Procedure Component Value Units Date/Time    Basic Metabolic Panel [[id number]]  (Abnormal) Collected: 06/02/21 0226    Specimen: Blood Updated: 06/02/21 0407     Glucose 135 mg/dL      BUN 43 mg/dL      Creatinine 1.60 mg/dL      Sodium 142 mmol/L      Potassium 4.0 mmol/L      Chloride 103 mmol/L      CO2 27.0 mmol/L      Calcium 8.3 mg/dL      eGFR Non African Amer 43 mL/min/1.73      BUN/Creatinine Ratio 26.9     Anion Gap 12.0 mmol/L     Narrative:      GFR Normal >60  Chronic Kidney Disease <60  Kidney Failure <15      POC Glucose Once [[id number]]  (Abnormal) Collected: 06/01/21 2041    Specimen: Blood Updated: 06/01/21 2042     Glucose 135 mg/dL      Comment: " Serial Number: [id number]:  [id number]       POC Glucose Once [[id number]]  (Abnormal) Collected: 06/01/21 1659    Specimen: Blood Updated: 06/01/21 1700     Glucose 196 mg/dL      Comment: Serial Number: [id number]:  [id number]       POC Glucose Once [[id number]]  (Abnormal) Collected: 06/01/21 1215    Specimen: Blood Updated: 06/01/21 1217     Glucose 183 mg/dL      Comment: Serial Number: [id number]:  [id number]             Imaging Results (Last 24 Hours)     ** No results found for the last 24 hours. **      LAB RESULTS (LAST 7 DAYS)    CBC  Results from last 7 days   Lab Units 05/28/21 0328   WBC 10*3/mm3 6.70   RBC 10*6/mm3 2.80*   HEMOGLOBIN g/dL 8.4*   HEMATOCRIT % 25.4*   MCV fL 90.6   PLATELETS 10*3/mm3 241       BMP  Results from last 7 days   Lab Units 06/02/21 0226 06/01/21 0237 05/31/21 0205 05/29/21 0147 05/28/21 0328 05/27/21  1139   SODIUM mmol/L 142 142 141 142 143 141   POTASSIUM mmol/L 4.0 4.0 4.2 4.0 4.0 4.3   CHLORIDE mmol/L 103 104 101 106 105 104   CO2 mmol/L 27.0 28.0 27.0 26.0 26.0 26.0   BUN mg/dL 43* 43* 44* 44* 46* 45*   CREATININE mg/dL 1.60* 1.55* 1.69* 1.56* 1.56* 1.68*   GLUCOSE mg/dL 135* 92 165* 55* 52* 177*   MAGNESIUM mg/dL  --  2.0 2.1  --   --   --        CMP   Results from last 7 days   Lab Units 06/02/21 0226 06/01/21 0237 05/31/21 0205 05/29/21 0147 05/28/21 0328 05/27/21  1139   SODIUM mmol/L 142 142 141 142 143 141   POTASSIUM mmol/L 4.0 4.0 4.2 4.0 4.0 4.3   CHLORIDE mmol/L 103 104 101 106 105 104   CO2 mmol/L 27.0 28.0 27.0 26.0 26.0 26.0   BUN mg/dL 43* 43* 44* 44* 46* 45*   CREATININE mg/dL 1.60* 1.55* 1.69* 1.56* 1.56* 1.68*   GLUCOSE mg/dL 135* 92 165* 55* 52* 177*         BNP        TROPONIN        CoAg        Creatinine Clearance  Estimated Creatinine Clearance: 54.4 mL/min (A) (by C-G formula based on SCr of 1.6 mg/dL (H)).    ABG        Radiology  No radiology results for the last day            EKG          I personally viewed and  interpreted the patient's EKG/Telemetry data: Sinus rhythm nonspecific ST-T wave changes    ECHOCARDIOGRAM:    Results for orders placed during the hospital encounter of 05/06/21    Adult Transthoracic Echo Complete w/ Color, Spectral and Contrast if necessary per protocol    Interpretation Summary  Technically difficult study due to poor acoustic windows, not all left ventricular walls are well visualized therefore Lumason contrast was given to assess LV function..  LVE with basal inferior wall hypokinesis and septal dyskinesis, estimated LV ejection fraction of 35%  Normal RV size  Biatrial enlargement seen.  Aortic valve, mitral valve, tricuspid valve appears structurally normal, no significant regurgitation seen.  No pericardial effusion seen.  Proximal aorta appears normal in size.          STRESS MYOVIEW:    Cardiolite (Tc-99m Sestamibi) stress test    CARDIAC CATHETERIZATION:            OTHER:         Assessment/Plan     Principal Problem:    Abnormal nuclear stress test  Active Problems:    Elevated troponin    Acute on chronic congestive heart failure (CMS/HCC)    Type 2 diabetes mellitus with hyperglycemia (CMS/[facility])    Cytokine release syndrome, grade 1    Unstable angina (CMS/[facility], located within [facility])      [[[[[[[[[[[[[[[[[[[[[[[[[    Impression  ==============  -Acute on chronic congestive heart failure.  Recent echocardiogram showed left ventricle dysfunction with ejection fraction of 35%.     -History of right-sided weakness with left MCA territory stroke.-Improved      -status post CABG 2005. status post stent to LAD 2009    Status post stent to LAD 11/13/2015  Status post stent to mid LAD and SVG to marginal branch 09/16/2016.  Status post stent to mid LAD 5/24/2021  Status post stent to SVG to RCA 5/26/2021    Cardiac catheterization 5/24/2021 revealed  Left ventricle angiogram was not performed due to pre-existing renal dysfunction.  Left main coronary artery normal.  Left anterior descending artery has mid segment  lengthy 90% disease within the previously placed stent.  Distal left into descending artery has diffuse disease.  Circumflex coronary artery is totally occluded.  (Chronic)  Right coronary artery is chronically occluded.  SVG to marginal branch (jump graft to OM1 and OM 2) is totally occluded (new finding compared to 2015.  SVG to PDA has distal radiolucency.  However no definite obstructive disease is present.         -status post myocardial infarction 2000 and 2002 .      -history of intermittent atrial fibrillation-maintaining sinus rhythm     Transesophageal echocardiogram 11/30/2020.  Biatrial enlargement.  Smoking effect in the left atrium and left ventricle and left atrial appendage.  Mild mitral and aortic regurgitation.  Left ventricle enlargement with diffuse hypocontractility with ejection fraction of 35 to 40%.     Echocardiogram 11/27/2020 revealed left atrial enlargement left ventricle dysfunction with ejection fraction of 40%.  Negative bubble study.      -diabetes hypertension and sleep apnea in history of renal dysfunction .  Recent BUN/creatinine 38/1.36     -status post colon surgery (partial colectomy) appendectomy cholecystectomy right 4th toe removal and carpal tunnel surgery      -continued smoking 1 pack per day -abstinence from smoking      -allergy to codeine.  ============   Plan  ================  Status post CABG  Abnormal stress Cardiolite test.    Cardiac catheterization 5/24/2021 revealed  Patient had intervention to left anterior descending artery.  5/24/2021  Spontaneous prolonged episode of asystole is of concern.  Patient had recovered without any intervention or CPR.  We will closely observe for any bradycardia arrhythmias and patient may need pacemaker/ICD implantation.    Status post stent to LAD 5/24/2021.  Status post stent to SVG to RCA 5/26/2021    Left ventricle dysfunction with ejection fraction of 35%.  Patient has acute on chronic congestive heart failure. Compensated  at this time.     Paroxysmal atrial fibrillation.  Patient is mostly in sinus rhythm.    Renal dysfunction stable  BUN 51 creatinine 2.0.  54/2.14  51/2.01  52/1.79  48/1.7-5/23/2021  48/1.79-5/25/2021  48/1.84-5/26/2021  46/1.56-5/28/2021  44/1.56-5/29/2021  44/1.69-5/31/2021  43/1.55-6/1/2021  43/1.6-6/2/2021     Anemia  Hemoglobin 8.8  8.5  8.4-5/28/2021     Anticoagulation  Patient is on Eliquis.    Medications were reviewed and updated.    Waiting for transfer to rehab.    Follow-up in the office in 2 weeks.    Current medications include Eliquis aspirin atenolol atorvastatin Plavix Lanoxin isosorbide.    Follow-up labs ordered.    Further plan will depend on patient's progress.   ]]]]]]]]]]]]]]]]]]]]]]              Jose G Rm MD  06/02/21  06:44 EDT                Electronically signed by Jose G Rm MD at 06/02/21 0838          Consult Notes (most recent note)      Yony Bhat MD at 05/22/21 1419      Consult Orders    1. Inpatient Nephrology Consult [[id number]] ordered by Elzbieta Vásquez MD at 05/21/21 1602               RENAL/KCC:    Referring Provider: Dr. ORONA  Reason for Consultation: DILEEP/CKD    Subjective     Chief complaint: SOA    History of present illness:  Patient is a 69 yo WM with h/o CKD3 who states he does not see a kidney doctor.  His baseline Cr looks to run in the low-mid 1's.  He was admitted with acute CHF.  Cr was 2 on admission.  He has been started on IV Lasix and Cr is improved to 1.79 today.  He has had a stress test which was abnormal.  Cardiology likely to proceed with cardiac cath.  No other complaints today.    History  Past Medical History:   Diagnosis Date   • Appetite loss    • Brain bleed (CMS/HCC)    • Carpal tunnel syndrome on left     carpal tunnel on left hand   • Diabetic neuropathy (CMS/HCC)    • DM2 (diabetes mellitus, type 2) (CMS/[facility])    • History of angina    • Hyperlipidemia    • Hypogonadism in male    • Obesity    • Sleep apnea    •  Stroke (CMS/HCC)    • Unsteady gait    ,   Past Surgical History:   Procedure Laterality Date   • ANGIOPLASTY      X2   • APPENDECTOMY     • CARDIAC CATHETERIZATION  11/13/2015   • CARPAL TUNNEL RELEASE Left 04/29/2018    carpal tunnel- lt hand// other hand surgeries    • CATARACT EXTRACTION, BILATERAL  2002    Dr. Lux Acosta   • CHOLECYSTECTOMY     • COLON RESECTION  2005   • CORONARY ANGIOPLASTY     • CORONARY ANGIOPLASTY WITH STENT PLACEMENT  11/13/2015    PTCA stent to proximal in stent and mid to distal lad   • CORONARY ANGIOPLASTY WITH STENT PLACEMENT  09/16/2016    PTCA stent to mid lad and stent to vein graft to marginal   • CORONARY ARTERY BYPASS GRAFT  2005    @ [facility]   • CYST REMOVAL      cyst removed from scrotum   • FOOT SURGERY Right 07/17/2018   • FOOT SURGERY Left 02/04/2019   • TOE AMPUTATION Right     right toe removed D/T infected cut that went to the bone   ,   Family History   Problem Relation Age of Onset   • Heart disease Mother    • Heart disease Father    • Diabetes Sister    • Heart disease Sister    • Diabetes Brother    • Mental illness Brother    ,   Social History     Socioeconomic History   • Marital status:      Spouse name: Not on file   • Number of children: Not on file   • Years of education: Not on file   • Highest education level: Not on file   Tobacco Use   • Smoking status: Former Smoker     Packs/day: 1.00     Types: Cigarettes   • Smokeless tobacco: Never Used   Vaping Use   • Vaping Use: Never used   Substance and Sexual Activity   • Alcohol use: No   • Drug use: Yes     Frequency: 1.0 times per week     Types: Marijuana     Comment: socially   • Sexual activity: Defer     E-cigarette/Vaping   • E-cigarette/Vaping Use Never User      E-cigarette/Vaping Substances     E-cigarette/Vaping Devices       ,   Medications Prior to Admission   Medication Sig Dispense Refill Last Dose   • apixaban (ELIQUIS) 5 MG tablet tablet Take 1 tablet by mouth Every 12 (Twelve) Hours.  Indications: Atrial Fibrillation 60 tablet 0    • aspirin 81 MG chewable tablet Chew 81 mg Daily.      • atenolol (TENORMIN) 100 MG tablet Take 100 mg by mouth Daily.      • atorvastatin (LIPITOR) 40 MG tablet Take 40 mg by mouth Every Night.      • bumetanide (BUMEX) 1 MG tablet Take 1 mg by mouth Every Morning.      • cholecalciferol (VITAMIN D3) 25 MCG (1000 UT) tablet Take 1,000 Units by mouth Daily.      • digoxin (LANOXIN) 125 MCG tablet Take 125 mcg by mouth Daily. In the afternoon      • donepezil (ARICEPT) 10 MG tablet Take 10 mg by mouth Every Night.      • DULoxetine (CYMBALTA) 60 MG capsule Take 60 mg by mouth Daily.      • gabapentin (NEURONTIN) 100 MG capsule Take 200 mg by mouth 2 (two) times a day.      • Glucagon, rDNA, (Glucagon Emergency) 1 MG kit Inject 1 mg as directed 1 (One) Time As Needed (hypoglycemia).      • hydrALAZINE (APRESOLINE) 50 MG tablet Take 50 mg by mouth 2 (two) times a day. Hold for SBP <110      • HYDROcodone-acetaminophen (NORCO) 7.5-325 MG per tablet Take 1 tablet by mouth Every 12 (Twelve) Hours As Needed for Mild Pain  or Moderate Pain .      • insulin NPH-insulin regular (humuLIN 70/30,novoLIN 70/30) (70-30) 100 UNIT/ML injection Inject 30 Units under the skin into the appropriate area as directed 2 (Two) Times a Day With Meals.      • isosorbide mononitrate (IMDUR) 60 MG 24 hr tablet Take 60 mg by mouth 2 (Two) Times a Day.      • metFORMIN ER (GLUCOPHAGE-XR) 500 MG 24 hr tablet Take 500 mg by mouth 2 (two) times a day.      • Omega-3 Fatty Acids (fish oil) 1000 MG capsule capsule Take 1,000 mg by mouth Daily.      • Skin Protectants, Misc. (eucerin) cream Apply 1 application topically to the appropriate area as directed 2 (Two) Times a Day As Needed for Dry Skin. To upper & lower extremities      • tiotropium (Spiriva HandiHaler) 18 MCG per inhalation capsule Place 1 capsule into inhaler and inhale Daily. 30 capsule 1    • vitamin B-12 (CYANOCOBALAMIN) 1000 MCG tablet  Take 1,000 mcg by mouth Daily.      , Scheduled Meds:  [START ON 5/23/2021] Acetylcysteine, 600 mg, Oral, BID  aspirin, 81 mg, Oral, Daily  atenolol, 100 mg, Oral, Daily  atorvastatin, 40 mg, Oral, Nightly  cholecalciferol, 1,000 Units, Oral, Daily  digoxin, 125 mcg, Oral, Daily  donepezil, 10 mg, Oral, Nightly  DULoxetine, 60 mg, Oral, Daily  furosemide, 40 mg, Intravenous, Q12H  gabapentin, 200 mg, Oral, Q12H  insulin lispro, 0-14 Units, Subcutaneous, TID AC  insulin NPH-insulin regular, 30 Units, Subcutaneous, BID With Meals  ipratropium, 0.5 mg, Nebulization, 4x Daily - RT  isosorbide mononitrate, 60 mg, Oral, BID  sodium chloride, 3 mL, Intravenous, Q12H  vitamin B-12, 1,000 mcg, Oral, Daily    , Continuous Infusions:   , PRN Meds:  dextrose  •  dextrose  •  glucagon (human recombinant)  •  hydrALAZINE  •  HYDROcodone-acetaminophen  •  insulin lispro **AND** insulin lispro  •  ipratropium-albuterol  •  nitroglycerin  •  ondansetron **OR** ondansetron  •  [COMPLETED] Insert peripheral IV **AND** sodium chloride  •  sodium chloride and Allergies:  Codeine    Review of Systems  Pertinent items are noted in HPI    Objective     Vital Signs  Temp:  [97.4 °F (36.3 °C)-98.3 °F (36.8 °C)] 98.1 °F (36.7 °C)  Heart Rate:  [57-65] 62  Resp:  [18-22] 20  BP: (149-165)/(76-81) 154/76    I/O this shift:  In: -   Out: 980 [Urine:980]  I/O last 3 completed shifts:  In: 720 [P.O.:720]  Out: 4225 [Urine:4225]    Physical Exam:  GEN: Awake, NAD  ENT: PERRL, EOMI, MMM  NECK: Supple, no JVD  CHEST: CTAB, no W/R/C  CV: RRR, no M/G/R  ABD: Soft, NT, +BS  SKIN: Warm and Dry  NEURO: CN's intact      Results Review:   I reviewed the patient's new clinical results.    WBC WBC   Date Value Ref Range Status   05/22/2021 6.60 3.40 - 10.80 10*3/mm3 Final      HGB Hemoglobin   Date Value Ref Range Status   05/22/2021 8.9 (L) 13.0 - 17.7 g/dL Final      HCT Hematocrit   Date Value Ref Range Status   05/22/2021 27.0 (L) 37.5 - 51.0 % Final       Platlets No results found for: LABPLAT   MCV MCV   Date Value Ref Range Status   05/22/2021 90.0 79.0 - 97.0 fL Final          Sodium Sodium   Date Value Ref Range Status   05/22/2021 140 136 - 145 mmol/L Final   05/21/2021 139 136 - 145 mmol/L Final   05/20/2021 142 136 - 145 mmol/L Final      Potassium Potassium   Date Value Ref Range Status   05/22/2021 3.9 3.5 - 5.2 mmol/L Final     Comment:     Slight hemolysis detected by analyzer. Results may be affected.   05/21/2021 4.2 3.5 - 5.2 mmol/L Final   05/20/2021 4.2 3.5 - 5.2 mmol/L Final      Chloride Chloride   Date Value Ref Range Status   05/22/2021 102 98 - 107 mmol/L Final   05/21/2021 102 98 - 107 mmol/L Final   05/20/2021 105 98 - 107 mmol/L Final      CO2 CO2   Date Value Ref Range Status   05/22/2021 26.0 22.0 - 29.0 mmol/L Final   05/21/2021 26.0 22.0 - 29.0 mmol/L Final   05/20/2021 26.0 22.0 - 29.0 mmol/L Final      BUN BUN   Date Value Ref Range Status   05/22/2021 52 (H) 8 - 23 mg/dL Final   05/21/2021 51 (H) 8 - 23 mg/dL Final   05/20/2021 54 (H) 8 - 23 mg/dL Final      Creatinine Creatinine   Date Value Ref Range Status   05/22/2021 1.79 (H) 0.76 - 1.27 mg/dL Final   05/21/2021 2.01 (H) 0.76 - 1.27 mg/dL Final   05/20/2021 2.14 (H) 0.76 - 1.27 mg/dL Final      Calcium Calcium   Date Value Ref Range Status   05/22/2021 8.6 8.6 - 10.5 mg/dL Final   05/21/2021 8.8 8.6 - 10.5 mg/dL Final   05/20/2021 8.7 8.6 - 10.5 mg/dL Final      PO4 No results found for: CAPO4   Albumin No results found for: ALBUMIN   Magnesium Magnesium   Date Value Ref Range Status   05/22/2021 2.0 1.6 - 2.4 mg/dL Final   05/21/2021 1.9 1.6 - 2.4 mg/dL Final   05/20/2021 2.0 1.6 - 2.4 mg/dL Final      Uric Acid No results found for: URICACID         [START ON 5/23/2021] Acetylcysteine, 600 mg, Oral, BID  aspirin, 81 mg, Oral, Daily  atenolol, 100 mg, Oral, Daily  atorvastatin, 40 mg, Oral, Nightly  cholecalciferol, 1,000 Units, Oral, Daily  digoxin, 125 mcg, Oral,  Daily  donepezil, 10 mg, Oral, Nightly  DULoxetine, 60 mg, Oral, Daily  furosemide, 40 mg, Intravenous, Q12H  gabapentin, 200 mg, Oral, Q12H  insulin lispro, 0-14 Units, Subcutaneous, TID AC  insulin NPH-insulin regular, 30 Units, Subcutaneous, BID With Meals  ipratropium, 0.5 mg, Nebulization, 4x Daily - RT  isosorbide mononitrate, 60 mg, Oral, BID  sodium chloride, 3 mL, Intravenous, Q12H  vitamin B-12, 1,000 mcg, Oral, Daily           Assessment/Plan       DILEEP    CKD3    Elevated troponin    Acute on chronic congestive heart failure (CMS/HCC)    Type 2 diabetes mellitus with hyperglycemia (CMS/[facility])    PLAN: Continue IV Lasix.  Cr improved.  Add Mucomyst tomorrow in anticipation of cardiac cath.  Will follow.    Yony Bhat MD   [facility]  05/22/21  @NOW          Electronically signed by Yony Bhat MD at 05/22/21 1422          Physical Therapy Notes (most recent note)      Azucena Rivera PTA at 06/01/21 1343  Version 1 of 1       Goal Outcome Evaluation:     Bed mobility - Mod-A supine to sit to eob.  Pt had difficulty sitting at eob initially.  Leaning to the right  Transfers - Mod-A, Assist x 2 and with rolling walker  Ambulation - 5 feet Mod-A, Assist x 2 and with rolling walker      Pt would benefit from Inpatient Rehabilitation placement at discharge from facility   Pt desires Inpatient Rehabilitation placement at discharge. Pt cooperative; agreeable to therapeutic recommendations and plan of care.      Electronically signed by Azucena Rivera PTA at 06/01/21 1650          Occupational Therapy Notes (most recent note)      Loli Constantino OT at 06/01/21 1622        Bed Mobility: Mod-A  Functional Transfers: Mod-A and Assist x 2  Functional Ambulation: steps towards BSR with min-mod A x2 for safety     Grooming: Min-A  ADL Position: supported sitting  ADL Comments: Pt min A for brushing hair secondary to fatigue, pt min A for oral hygiene sitting in BSR      Feeding: set up  ADL Position: supported sitting  ADL Comments: Pt set up for eating sitting in BSR    Assessment: Benson Mclean presents with ADL impairments below baseline abilities which indicate the need for continued skilled intervention while inpatient. Tolerating ADL session today without incident. Pt continues to require 2 people for transfers and functional mobility secondary decreased endurance. Will continue to follow and progress as tolerated.     Plan/Recommendations:   Pt would benefit from Inpatient Rehabilitation placement at discharge from facility.   Pt desires Inpatient Rehabilitation placement at discharge. Pt cooperative; agreeable to therapeutic recommendations and plan of care.     Electronically signed by Loli Constantino OT at 06/01/21 8776

## 2021-06-02 NOTE — CASE MANAGEMENT/SOCIAL WORK
Continued Stay Note   Brennan     Patient Name: Benson Mclean  MRN: [id number]  Today's Date: 2021    Admit Date: 2021    Discharge Plan     Row Name 21 1639       Plan    Plan  D/C Plan : [facility] accepted pending precert. Pre-cert restarted again on , per Edi estrada. PAS[facility] approved .    Plan Comments  TANA called Edi liayuri, who stated [employer] is requesting updated notes. TANA faxed notes x3 to Edi estrada ([employer]). TANA attempted to call Radha with [employer] to verify, no answer and TANA left a voicemail requesting a call back. TANA received return voicemail from D[employer] with [employer], stating it looks like previous pre-cert (which had already ) was deleted. TANA called Edi estrada ([employer]) and reported again that [employer] still does not acknowledge pending pre-cert and requested new pre-cert to start on . Per Edi estrada, new pre-cert is being started.        Phone communication or documentation only - no physical contact with patient or family.    Danielle Perry, [facility] [place]    Office: [phone]  Cell: [phone]  Fax: [phone]  E-mail: [email or web address]

## 2021-06-02 NOTE — PROGRESS NOTES
[facility] Medicine Services Daily Progress Note      Hospitalist Team  LOS 10 days      Patient Care Team:  Samantha Zabala APRN as PCP - General  Samantha Zabala APRN as PCP - Family Medicine  Jose G Rm MD as Consulting Physician (Cardiology)    Patient Location: 2109/1      Subjective   Subjective     Chief Complaint / Subjective  Chief Complaint   Patient presents with   • Shortness of Breath         Brief Synopsis of Hospital Course/HPI    The patient is a 70 years old male with history of CAD s/p CABG, paroxysmal atrial fibrillation, HFrEF 35%, hypertension, hyperlipidemia, IDDM, and CKD stage IIIb.    The patient presented to the emergency room on 5/18/2021 for evaluation of worsening dyspnea on arrest in association with productive clear cough.  The patient was recently hospitalized between 5/6/2021 and 5/8/2021 for acute on chronic heart failure.  Nephrology was consulted before the patient went for LHC.       Hospital course:    The patient was admitted to the medical floor.  He was evaluated by cardiologist.  He underwent stress test on 5/20/2021 which was abnormal with inferior and posterior lateral ischemia.  The patient underwent LHC on 5/24/2021 with stent to LAD complicated with sinus pause which resolved.  The patient had repeat LHC on 5/26/2021 with stent to SVG to RCA.  The patient was evaluated by physical therapy and recommended inpatient rehab.    Date::    5/24/21: OOB to chair.  Denies home oxygen use.  S/p LHC --> NIELS to LAD.  Episodes of sinus pause.    5/25/21: OOB to chair.  No complaints.  Tolerating diet.  5/26/21: No complaints. S/p LHC with stent to SVG to RCA  5/27/21: Denies shortness of air.  Uses CPAP at night.  Awaiting [facility] placement.  5/28/21: Waiting for rehab.  5/29/21: Morning BS low.  Held NPH.  Reduced dose to 20 units twice daily.  Awaiting placement.  5/30/21: No complaints.  Awaiting bed.  Monitor for hypoglycemia.  5/31/2021  The patient  "remains in a holding pattern awaiting his pre-CERT for inpatient rehab.  He offers no new complaints today.  He seems in a fairly  mood.    6/1/2021  Patient offers no new complaints.  He has not had any episodes of bradycardia nor has he had any episodes of chest pain.    6/2/2021  The patient continues to feel better but declined in terms of his ability to care for himself.  He is still awaiting precertification for rehab stay.    Review of Systems   Constitutional: Negative.   HENT: Negative.    Eyes: Negative.    Cardiovascular: Negative.    Respiratory: Negative.    Endocrine: Negative.    Hematologic/Lymphatic: Negative.    Skin: Negative.    Musculoskeletal: Negative.         Diffuse weakness throughout unchanged.   Gastrointestinal: Negative.    Genitourinary: Negative.    Neurological: Negative.    Psychiatric/Behavioral: Negative.    Allergic/Immunologic: Negative.    All other systems reviewed and are negative.    Objective   Objective      Vital Signs  Temp:  [97.6 °F (36.4 °C)-99 °F (37.2 °C)] 99 °F (37.2 °C)  Heart Rate:  [57-91] 88  Resp:  [16-23] 20  BP: (134-181)/(56-99) 181/99  Oxygen Therapy  SpO2: 96 %  Pulse Oximetry Type: Continuous  Device (Oxygen Therapy): nasal cannula  Flow (L/min): 2  Oxygen Concentration (%): 28  Flowsheet Rows      First Filed Value   Admission Height  175.3 cm (69\") Documented at 05/18/2021 2219   Admission Weight  122 kg (270 lb) Documented at 05/18/2021 2219        Intake & Output (last 3 days)       05/31 0701 - 06/01 0700 06/01 0701 - 06/02 0700 06/02 0701 - 06/03 0700    P.O. 960 960 840    Total Intake(mL/kg) 960 (7.9) 960 (8.1) 840 (7.1)    Urine (mL/kg/hr) 1550 (0.5) 400 (0.1) 600 (0.4)    Stool 0      Total Output 1550 400 600    Net -590 +560 +240           Urine Unmeasured Occurrence 1 x      Stool Unmeasured Occurrence 1 x 1 x         Lines, Drains & Airways    Active LDAs     Name:   Placement date:   Placement time:   Site:   Days:    Peripheral IV " 05/18/21 2246 Right Antecubital   05/18/21    2246    Antecubital   5            Physical Exam:  Physical Exam  HENT:      Head: Normocephalic.      Nose: Nose normal.   Eyes:      Pupils: Pupils are equal, round, and reactive to light.   Pulmonary:      Breath sounds: Normal breath sounds.   Abdominal:      General: Bowel sounds are normal.      Tenderness: There is no abdominal tenderness.      Comments: Obese abdomen   Musculoskeletal:      Comments: Moving all extremities equally   Lymphadenopathy:      Cervical: No cervical adenopathy.   Skin:     General: Skin is warm.      Findings: No rash.   Neurological:      Mental Status: He is alert and oriented to person, place, and time.   Psychiatric:         Mood and Affect: Mood normal.         Behavior: Behavior is cooperative.       Procedures:  Procedure(s):  Left Heart Cath and coronary angiogram    Results Review:     I reviewed the patient's new clinical results.      Lab Results (last 24 hours)     Procedure Component Value Units Date/Time    POC Glucose Once [[id number]]  (Abnormal) Collected: 06/02/21 1640    Specimen: Blood Updated: 06/02/21 1641     Glucose 163 mg/dL      Comment: Serial Number: [id number]:  [id number]       POC Glucose Once [[id number]]  (Abnormal) Collected: 06/02/21 1127    Specimen: Blood Updated: 06/02/21 1128     Glucose 226 mg/dL      Comment: Serial Number: [id number]:  [id number]       POC Glucose Once [[id number]]  (Abnormal) Collected: 06/02/21 0715    Specimen: Blood Updated: 06/02/21 0716     Glucose 147 mg/dL      Comment: Serial Number: [id number]:  [id number]       Basic Metabolic Panel [[id number]]  (Abnormal) Collected: 06/02/21 0226    Specimen: Blood Updated: 06/02/21 0407     Glucose 135 mg/dL      BUN 43 mg/dL      Creatinine 1.60 mg/dL      Sodium 142 mmol/L      Potassium 4.0 mmol/L      Chloride 103 mmol/L      CO2 27.0 mmol/L      Calcium 8.3 mg/dL      eGFR Non African Amer 43 mL/min/1.73       BUN/Creatinine Ratio 26.9     Anion Gap 12.0 mmol/L     Narrative:      GFR Normal >60  Chronic Kidney Disease <60  Kidney Failure <15          No results found for: HGBA1C            No results found for: LIPASE  Lab Results   Component Value Date    CHOL 165 11/27/2020    CHLPL 116 09/28/2020    TRIG 198 (H) 11/27/2020    HDL 42 11/27/2020    LDL 89 11/27/2020       No results found for: INTRAOP, PREDX, FINALDX, COMDX    Microbiology Results (last 10 days)     ** No results found for the last 240 hours. **          ECG/EMG Results (most recent)     Procedure Component Value Units Date/Time    ECG 12 Lead [[id number]] Collected: 05/18/21 2235     Updated: 05/20/21 0840     QT Interval 413 ms     Narrative:      HEART RATE= 62  bpm  RR Interval= 972  ms  [place] Interval= 113  ms  P Horizontal Axis= -87  deg  P Front Axis= 0  deg  QRSD Interval= 104  ms  QT Interval= 413  ms  QRS Axis= 58  deg  T Wave Axis= 251  deg  - ABNORMAL ECG -  Atrial fibrillation  When compared with ECG of 07-May-2021 4:48:57,  Electronically Signed By: Jacob Amador ([facility]) 20-May-2021 08:39:56  Date and Time of Study: 2021-05-18 22:35:54    EP/CRM Study [[id number]] Resulted: 05/24/21 2217     Updated: 05/26/21 0832    Narrative:      Procedures:  #1 stent placement to the LAD  2. Temporary pacemaker placed    Indication:  Unstable angina    Comments:  After the cardiac catheterization is complete patient was given aspirin   Plavix and heparin per weightbase protocol. A 6 Russian sheath was placed   in the right femoral artery. Then a temporary pacemaker was placed in the   right ventricle and set at a rate of 50 bpm and MA of 10. A 6 Russian XB   guide was used and left coronary artery was engaged. Multiple views were   taken. A 190 cm whisper wire was used in place the distal portion of the   LAD. Then a 2.5/20 mm balloon was used and inflated at 10 dionna each time   for about 10 seconds x 2 times. Thereafter a 2.5/38 mm Xience stent was   used and  placed across the lesion and inflated at 16 dionna for 15 seconds.   Thereafter the balloon was taken. Review angiogram showed no dissection or   perforation. Patient tolerated the procedure very well. No complication   noted.  The temporary pacemaker was then removed without any complications.    Impression: Successful stent implantation of a drug-eluting stent in the   proximal to mid LAD without any complications.    There is 80 to 90% disease in the proximal to mid LAD with DENIZ-3 flow   before PCI and 0% with DENIZ-3 flow after PCI    EP/CRM Study [[id number]] Resulted: 05/26/21 1948     Updated: 05/27/21 0722    Narrative:      Procedure: #1 stent placement to the SVG to the RCA  2.  Temporary pacemaker placement    Indication; unstable angina    Comments: Informed consent was obtained to the patient after explained   risks and benefits.  Patient was brought to cardiac catheter laboratory   and placed in the table usual fashion.  Right groin was shaved and prepped   in sterile fashion.  2% lidocaine was used to administer local esthesia to   right groin.  A total of 20 cc was used.  A 6 Macedonian sheath was placed in   the right femoral vein.  Then a temporary pacemaker was placed in the   right ventricle and placed at a rate of 50 bpm and MAR 5.  A 6 Macedonian   sheath was placed in the right femoral artery.  Thereafter 6 Macedonian   multipurpose guide was used and the graft to the right coronary artery was   engaged.  Aspirin Plavix and heparin per weightbase protocol were given.    A 190 cm whisper wire was gently the distal portion of the RCA.  Then a   4.0/15 mm Xience stent was used and placed across the lesion and inflated   at 12 to 14 dionna for 10 seconds.  Thereafter the balloon was taken out.    Reviewing angiogram showed no dissection or perforation.  Patient   tolerated the procedure very well but he was paced for a few seconds and   hence he was observed for few minutes and then the temporary pacemaker was    taken out.  No complication noted.    Impression: Successful stent implantation of a drug-eluting stent in the   distal portion of the SVG to the RCA and using a temporary pacemaker.    There is haziness with about 70 to 80% disease in the distal portion of   the RCA with DENIZ-3 flow before PCI and 0% with DENIZ-3 flow after PCI    ECG 12 Lead [[id number]] Collected: 05/29/21 1149     Updated: 05/29/21 1151     QT Interval 433 ms     Narrative:      HEART RATE= 62  bpm  RR Interval= 988  ms  CO Interval= 178  ms  P Horizontal Axis=   deg  P Front Axis= 0  deg  QRSD Interval= 106  ms  QT Interval= 433  ms  QRS Axis= 43  deg  T Wave Axis= 208  deg  - ABNORMAL ECG -  Sinus rhythm  Multiform ventricular premature complexes  When compared with ECG of 26-May-2021 0:02:43,  Significant axis, voltage or hypertrophy change  Electronically Signed By:   Date and Time of Study: 2021-05-29 11:49:58    ECG 12 Lead [[id number]] Collected: 05/26/21 0002     Updated: 05/30/21 1818     QT Interval 466 ms     Narrative:      HEART RATE= 60  bpm  RR Interval= 1008  ms  CO Interval=   ms  P Horizontal Axis=   deg  P Front Axis=   deg  QRSD Interval= 104  ms  QT Interval= 466  ms  QRS Axis= 57  deg  T Wave Axis= 229  deg  - ABNORMAL ECG -  A FIB  Low voltage, extremity leads  When compared with ECG of 22-May-2021 15:58:40,  NO SIGNIFICANT CHANGE FROM PREVIOUS ECG  Electronically Signed By: Jose G Rm ([facility]) 30-May-2021 18:17:42  Date and Time of Study: 2021-05-26 00:02:43    ECG 12 Lead [[id number]] Collected: 05/22/21 1558     Updated: 05/30/21 1819     QT Interval 427 ms     Narrative:      HEART RATE= 65  bpm  RR Interval= 1012  ms  CO Interval= 238  ms  P Horizontal Axis= 252  deg  P Front Axis= 0  deg  QRSD Interval= 107  ms  QT Interval= 427  ms  QRS Axis= 39  deg  T Wave Axis= 240  deg  - ABNORMAL ECG -  A FIB  LVH with secondary repolarization abnormality  When compared with ECG of 22-May-2021 0:09:41,  NO SIGNIFICANT CHANGE  FROM PREVIOUS ECG  Electronically Signed By: Jose G Rm ([facility]) 30-May-2021 18:18:32  Date and Time of Study: 2021-05-22 15:58:40    ECG 12 Lead [[id number]] Collected: 05/22/21 0009     Updated: 05/30/21 1819     QT Interval 428 ms     Narrative:      HEART RATE= 68  bpm  RR Interval= 876  ms  MA Interval=   ms  P Horizontal Axis=   deg  P Front Axis=   deg  QRSD Interval= 112  ms  QT Interval= 428  ms  QRS Axis= 51  deg  T Wave Axis= 226  deg  - ABNORMAL ECG -  Atrial flutter/fibrillation  Low voltage, extremity leads  When compared with ECG of 21-May-2021 18:33:28,  Significant rate decrease  Significant repolarization change  Significant axis, voltage or hypertrophy change  Electronically Signed By: Jose G Rm ([facility]) 30-May-2021 18:18:42  Date and Time of Study: 2021-05-22 00:09:41    ECG 12 Lead [[id number]] Collected: 05/21/21 1833     Updated: 05/30/21 1819     QT Interval 384 ms     Narrative:      HEART RATE= 92  bpm  RR Interval= 653  ms  MA Interval=   ms  P Horizontal Axis=   deg  P Front Axis=   deg  QRSD Interval= 102  ms  QT Interval= 384  ms  QRS Axis= 43  deg  T Wave Axis= 253  deg  - ABNORMAL ECG -  Atrial fibrillation  Ventricular premature complex  Nonspecific repol abnormality, diffuse leads  When compared with ECG of 18-May-2021 22:35:54,  Significant rate increase  Electronically Signed By: Jose G Rm ([facility]) 30-May-2021 18:18:51  Date and Time of Study: 2021-05-21 18:33:28              Results for orders placed during the hospital encounter of 05/06/21    Adult Transthoracic Echo Complete w/ Color, Spectral and Contrast if necessary per protocol    Interpretation Summary  Technically difficult study due to poor acoustic windows, not all left ventricular walls are well visualized therefore Lumason contrast was given to assess LV function..  LVE with basal inferior wall hypokinesis and septal dyskinesis, estimated LV ejection fraction of 35%  Normal RV size  Biatrial enlargement  seen.  Aortic valve, mitral valve, tricuspid valve appears structurally normal, no significant regurgitation seen.  No pericardial effusion seen.  Proximal aorta appears normal in size.      No radiology results for the last 7 days        Xrays, labs reviewed personally by physician.    Medication Review:   I have reviewed the patient's current medication list      Scheduled Meds  apixaban, 5 mg, Oral, Q12H  aspirin, 81 mg, Oral, Daily  atenolol, 100 mg, Oral, Daily  atorvastatin, 40 mg, Oral, Nightly  cholecalciferol, 1,000 Units, Oral, Daily  clopidogrel, 75 mg, Oral, Daily  digoxin, 125 mcg, Oral, Daily  donepezil, 10 mg, Oral, Nightly  DULoxetine, 60 mg, Oral, Daily  furosemide, 40 mg, Oral, Daily  gabapentin, 200 mg, Oral, Q12H  insulin lispro, 0-14 Units, Subcutaneous, TID AC  insulin NPH-insulin regular, 15 Units, Subcutaneous, BID With Meals  ipratropium, 0.5 mg, Nebulization, 4x Daily - RT  isosorbide mononitrate, 60 mg, Oral, BID  sodium chloride, 3 mL, Intravenous, Q12H  vitamin B-12, 1,000 mcg, Oral, Daily        Meds Infusions       Meds PRN  •  acetaminophen  •  atropine  •  dextrose  •  dextrose  •  glucagon (human recombinant)  •  guaiFENesin-dextromethorphan  •  hydrALAZINE  •  HYDROcodone-acetaminophen  •  insulin lispro **AND** insulin lispro  •  ipratropium-albuterol  •  nitroglycerin  •  ondansetron **OR** ondansetron  •  [COMPLETED] Insert peripheral IV **AND** sodium chloride  •  sodium chloride  •  sodium chloride  •  sodium chloride        Assessment/Plan   Assessment/Plan     Active Hospital Problems    Diagnosis  POA   • **Abnormal nuclear stress test [R94.39]  Unknown   • Unstable angina (CMS/HCC) [I20.0]  Unknown   • Cytokine release syndrome, grade 1 [D89.831]  Unknown   • Acute on chronic congestive heart failure (CMS/HCC) [I50.9]  Yes   • Type 2 diabetes mellitus with hyperglycemia (CMS/HCC) [E11.65]  Yes   • Elevated troponin [R77.8]  Yes      Resolved Hospital Problems   No resolved  problems to display.       MEDICAL DECISION MAKING COMPLEXITY BY PROBLEM:       Acute on chronic systolic heart failure:  -TTE 5/7/2021 --> LVEF 35%.  Biatrial enlargement  -Continue Lasix    Abnormal stress test:    -s/p [facility] 5/24/2021--> NIELS to LAD.  Episodes of sinus pause.    -s/p [facility] 5/26/2021--> stent to SVG to RCA       Elevated troponin (POA)  -Cardiology consulted    Acute kidney injury on chronic kidney disease stage III:  -Nephrology consulted    CAD s/p CABG and multiple PCI:  -Continue aspirin, atenolol, and statin    Intermittent atrial fibrillation:   -rate controlled on home atenolol and digoxin (level therapeutic)  -AC with Eliquis on discharge     COPD exacerbation:  -Continue bronchodilators    Diabetes mellitus complicated with hyperglycemia and diabetic neuropathy:  -Hold Metformin during hospitalization  -Pain controlled with gabapentin  -Continue ISS and home NPH    Dementia  -on home Aricept     Depression:  - on home Cymbalta     Essential hypertension:  -moderately controlled on home atenolol, Bumex, Imdur     Chronic pain with opiate dependence:  -On [place] at home    CVA with right-sided residual deficit:  -On aspirin and statin at home  -Inpatient rehab to improve his ability to function back to his previous baseline    Obstructive sleep apnea:  -May use home BiPAP    Anemia of chronic disease:  -Due to CKD    Deconditioning  -Awaiting pre-CERT for rehab.  -Continue current care plan until pre-CERT is approved.    VTE Prophylaxis -   Mechanical Order History:     None      Pharmalogical Order History:      Ordered     Dose Route Frequency Stop    05/19/21 0430  apixaban (ELIQUIS) tablet 5 mg  Status:  Discontinued      5 mg PO Every 12 Hours Scheduled 05/21/21 1180                  Code Status -   Code Status and Medical Interventions:   Ordered at: 05/19/21 0103     Code Status:    CPR     Medical Interventions (Level of Support Prior to Arrest):    Full       This patient has been  examined wearing appropriate Personal Protective Equipment and discussed with nursing. 06/02/21        Discharge Planning  Awaiting rehab bed.  Monitoring for hypoglycemia.  Walks with a walker at home and uses a wheelchair.        Electronically signed by Kasandra Ocampo MD, 06/02/21, 19:03 EDT.  [facility] Hospitalist Team

## 2021-06-02 NOTE — PLAN OF CARE
Goal Outcome Evaluation:  Plan of Care Reviewed With: patient  Progress: no change  Outcome Summary: Patient has experienced no acute events. Patient awaiting precert for placement in rehab. Will continue to monitor.

## 2021-06-02 NOTE — PROGRESS NOTES
"RENAL/KCC:     LOS: 10 days    Patient Care Team:  Samantha Zabala APRN as PCP - General  Samantha Zabala APRN as PCP - Family Medicine  Jose G Rm MD as Consulting Physician (Cardiology)    Chief Complaint:  DILEEP/CKD    Subjective     Interval History:   Chart reviewed.  Denies any CP.  No worsening SOA.  Non-oliguric.    Objective     Vital Sign Min/Max for last 24 hours  Temp  Min: 97.6 °F (36.4 °C)  Max: 98.7 °F (37.1 °C)   BP  Min: 120/68  Max: 160/63   Pulse  Min: 57  Max: 109   Resp  Min: 16  Max: 22   SpO2  Min: 93 %  Max: 100 %   Flow (L/min)  Min: 2  Max: 4   Weight  Min: 118 kg (260 lb 2.3 oz)  Max: 118 kg (260 lb 2.3 oz)     Flowsheet Rows      First Filed Value   Admission Height  175.3 cm (69\") Documented at 05/18/2021 2219   Admission Weight  122 kg (270 lb) Documented at 05/18/2021 2219          No intake/output data recorded.  I/O last 3 completed shifts:  In: 960 [P.O.:960]  Out: 700 [Urine:700]    Physical Exam:  GEN: Awake, NAD  ENT: PERRL, EOMI, MMM  NECK: Supple, no JVD  CHEST: CTAB, no W/R/C  CV: RRR, no M/G/R  ABD: Soft, NT, +BS  SKIN: Warm and Dry  NEURO: CN's intact      WBC No results found for: WBC   HGB No results found for: HGB   HCT No results found for: HCT   Platlets No results found for: LABPLAT   MCV No results found for: MCV       Sodium Sodium   Date Value Ref Range Status   06/02/2021 142 136 - 145 mmol/L Final   06/01/2021 142 136 - 145 mmol/L Final   05/31/2021 141 136 - 145 mmol/L Final      Potassium Potassium   Date Value Ref Range Status   06/02/2021 4.0 3.5 - 5.2 mmol/L Final   06/01/2021 4.0 3.5 - 5.2 mmol/L Final   05/31/2021 4.2 3.5 - 5.2 mmol/L Final      Chloride Chloride   Date Value Ref Range Status   06/02/2021 103 98 - 107 mmol/L Final   06/01/2021 104 98 - 107 mmol/L Final   05/31/2021 101 98 - 107 mmol/L Final      CO2 CO2   Date Value Ref Range Status   06/02/2021 27.0 22.0 - 29.0 mmol/L Final   06/01/2021 28.0 22.0 - 29.0 mmol/L Final   05/31/2021 27.0 " 22.0 - 29.0 mmol/L Final      BUN BUN   Date Value Ref Range Status   06/02/2021 43 (H) 8 - 23 mg/dL Final   06/01/2021 43 (H) 8 - 23 mg/dL Final   05/31/2021 44 (H) 8 - 23 mg/dL Final      Creatinine Creatinine   Date Value Ref Range Status   06/02/2021 1.60 (H) 0.76 - 1.27 mg/dL Final   06/01/2021 1.55 (H) 0.76 - 1.27 mg/dL Final   05/31/2021 1.69 (H) 0.76 - 1.27 mg/dL Final      Calcium Calcium   Date Value Ref Range Status   06/02/2021 8.3 (L) 8.6 - 10.5 mg/dL Final   06/01/2021 8.4 (L) 8.6 - 10.5 mg/dL Final   05/31/2021 8.8 8.6 - 10.5 mg/dL Final      PO4 No results found for: CAPO4   Albumin No results found for: ALBUMIN   Magnesium Magnesium   Date Value Ref Range Status   06/01/2021 2.0 1.6 - 2.4 mg/dL Final   05/31/2021 2.1 1.6 - 2.4 mg/dL Final      Uric Acid No results found for: URICACID        Results Review:     I reviewed the patient's new clinical results.    apixaban, 5 mg, Oral, Q12H  aspirin, 81 mg, Oral, Daily  atenolol, 100 mg, Oral, Daily  atorvastatin, 40 mg, Oral, Nightly  cholecalciferol, 1,000 Units, Oral, Daily  clopidogrel, 75 mg, Oral, Daily  digoxin, 125 mcg, Oral, Daily  donepezil, 10 mg, Oral, Nightly  DULoxetine, 60 mg, Oral, Daily  furosemide, 40 mg, Oral, Daily  gabapentin, 200 mg, Oral, Q12H  insulin lispro, 0-14 Units, Subcutaneous, TID AC  insulin NPH-insulin regular, 15 Units, Subcutaneous, BID With Meals  ipratropium, 0.5 mg, Nebulization, 4x Daily - RT  isosorbide mononitrate, 60 mg, Oral, BID  sodium chloride, 3 mL, Intravenous, Q12H  vitamin B-12, 1,000 mcg, Oral, Daily           Medication Review: Reviewed      Assessment/Plan       DILEEP    CKD3    Hypokalemia    CAD    Abnormal nuclear stress test    Elevated troponin    Acute on chronic congestive heart failure (CMS/HCC)    Type 2 diabetes mellitus with hyperglycemia (CMS/HCC)    Cytokine release syndrome, grade 1    Unstable angina (CMS/[facility])      Plan:  Cr stable at 1.6 = at baseline.  S/P repeat cardiac cath/PCI.   Volume stable on Lasix.  OK for D/C once rehab bed available. Has been scheduled for outpatient follow-up.      Yony Bhat MD   [facility]  06/02/21  11:10 EDT

## 2021-06-03 VITALS
HEART RATE: 59 BPM | WEIGHT: 262.79 LBS | TEMPERATURE: 97.8 F | RESPIRATION RATE: 18 BRPM | BODY MASS INDEX: 38.92 KG/M2 | SYSTOLIC BLOOD PRESSURE: 139 MMHG | HEIGHT: 69 IN | OXYGEN SATURATION: 97 % | DIASTOLIC BLOOD PRESSURE: 58 MMHG

## 2021-06-03 LAB
GLUCOSE BLDC GLUCOMTR-MCNC: 127 MG/DL (ref 70–105)
GLUCOSE BLDC GLUCOMTR-MCNC: 159 MG/DL (ref 70–105)
GLUCOSE BLDC GLUCOMTR-MCNC: 197 MG/DL (ref 70–105)
GLUCOSE BLDC GLUCOMTR-MCNC: 204 MG/DL (ref 70–105)

## 2021-06-03 PROCEDURE — 99232 SBSQ HOSP IP/OBS MODERATE 35: CPT | Performed by: INTERNAL MEDICINE

## 2021-06-03 PROCEDURE — 63710000001 INSULIN LISPRO (HUMAN) PER 5 UNITS: Performed by: HOSPITALIST

## 2021-06-03 PROCEDURE — 94799 UNLISTED PULMONARY SVC/PX: CPT

## 2021-06-03 PROCEDURE — 63710000001 INSULIN ISOPHANE & REGULAR PER 5 UNITS: Performed by: HOSPITALIST

## 2021-06-03 PROCEDURE — 82962 GLUCOSE BLOOD TEST: CPT

## 2021-06-03 PROCEDURE — 99239 HOSP IP/OBS DSCHRG MGMT >30: CPT | Performed by: INTERNAL MEDICINE

## 2021-06-03 RX ORDER — FUROSEMIDE 40 MG/1
40 TABLET ORAL DAILY
Qty: 30 TABLET | Refills: 0 | Status: ON HOLD | OUTPATIENT
Start: 2021-06-04 | End: 2021-07-02 | Stop reason: SDUPTHER

## 2021-06-03 RX ORDER — CLOPIDOGREL BISULFATE 75 MG/1
75 TABLET ORAL DAILY
Qty: 30 TABLET | Refills: 0 | Status: SHIPPED | OUTPATIENT
Start: 2021-06-04 | End: 2021-08-02 | Stop reason: HOSPADM

## 2021-06-03 RX ORDER — GUAIFENESIN/DEXTROMETHORPHAN 100-10MG/5
5 SYRUP ORAL EVERY 4 HOURS PRN
Qty: 30 EACH | Refills: 0 | Status: SHIPPED | OUTPATIENT
Start: 2021-06-03 | End: 2021-06-17

## 2021-06-03 RX ADMIN — IPRATROPIUM BROMIDE 0.5 MG: 0.5 SOLUTION RESPIRATORY (INHALATION) at 06:41

## 2021-06-03 RX ADMIN — ATENOLOL 100 MG: 50 TABLET ORAL at 08:47

## 2021-06-03 RX ADMIN — INSULIN HUMAN 15 UNITS: 100 INJECTION, SUSPENSION SUBCUTANEOUS at 17:59

## 2021-06-03 RX ADMIN — Medication 3 ML: at 08:47

## 2021-06-03 RX ADMIN — Medication 1000 UNITS: at 08:47

## 2021-06-03 RX ADMIN — CLOPIDOGREL BISULFATE 75 MG: 75 TABLET ORAL at 08:47

## 2021-06-03 RX ADMIN — CYANOCOBALAMIN TAB 1000 MCG 1000 MCG: 1000 TAB at 08:48

## 2021-06-03 RX ADMIN — FUROSEMIDE 40 MG: 40 TABLET ORAL at 08:47

## 2021-06-03 RX ADMIN — DULOXETINE 60 MG: 30 CAPSULE, DELAYED RELEASE ORAL at 08:47

## 2021-06-03 RX ADMIN — IPRATROPIUM BROMIDE 0.5 MG: 0.5 SOLUTION RESPIRATORY (INHALATION) at 10:35

## 2021-06-03 RX ADMIN — INSULIN LISPRO 3 UNITS: 100 INJECTION, SOLUTION INTRAVENOUS; SUBCUTANEOUS at 17:59

## 2021-06-03 RX ADMIN — APIXABAN 5 MG: 5 TABLET, FILM COATED ORAL at 08:46

## 2021-06-03 RX ADMIN — ISOSORBIDE MONONITRATE 60 MG: 60 TABLET, EXTENDED RELEASE ORAL at 08:47

## 2021-06-03 RX ADMIN — INSULIN HUMAN 15 UNITS: 100 INJECTION, SUSPENSION SUBCUTANEOUS at 08:46

## 2021-06-03 RX ADMIN — ASPIRIN 81 MG CHEWABLE TABLET 81 MG: 81 TABLET CHEWABLE at 08:47

## 2021-06-03 RX ADMIN — IPRATROPIUM BROMIDE 0.5 MG: 0.5 SOLUTION RESPIRATORY (INHALATION) at 15:30

## 2021-06-03 RX ADMIN — IPRATROPIUM BROMIDE 0.5 MG: 0.5 SOLUTION RESPIRATORY (INHALATION) at 18:39

## 2021-06-03 RX ADMIN — GABAPENTIN 200 MG: 100 CAPSULE ORAL at 08:47

## 2021-06-03 RX ADMIN — INSULIN LISPRO 5 UNITS: 100 INJECTION, SOLUTION INTRAVENOUS; SUBCUTANEOUS at 12:55

## 2021-06-03 NOTE — PROGRESS NOTES
"RENAL/KCC:     LOS: 11 days    Patient Care Team:  Samantha Zabala APRN as PCP - General  Samantha Zabala APRN as PCP - Family Medicine  Jose G Rm MD as Consulting Physician (Cardiology)    Chief Complaint:  DILEEP/CKD    Subjective     Interval History:   Chart reviewed.  Denies any CP.  No worsening SOA.  Non-oliguric.    Objective     Vital Sign Min/Max for last 24 hours  Temp  Min: 97.7 °F (36.5 °C)  Max: 99.2 °F (37.3 °C)   BP  Min: 135/56  Max: 181/99   Pulse  Min: 56  Max: 94   Resp  Min: 14  Max: 23   SpO2  Min: 93 %  Max: 98 %   Flow (L/min)  Min: 2  Max: 3   Weight  Min: 119 kg (262 lb 12.6 oz)  Max: 119 kg (262 lb 12.6 oz)     Flowsheet Rows      First Filed Value   Admission Height  175.3 cm (69\") Documented at 05/18/2021 2219   Admission Weight  122 kg (270 lb) Documented at 05/18/2021 2219          I/O this shift:  In: 480 [P.O.:480]  Out: -   I/O last 3 completed shifts:  In: 1440 [P.O.:1440]  Out: 1950 [Urine:1950]    Physical Exam:  GEN: Awake, NAD  ENT: PERRL, EOMI, MMM  NECK: Supple, no JVD  CHEST: CTAB, no W/R/C  CV: RRR, no M/G/R  ABD: Soft, NT, +BS  SKIN: Warm and Dry  NEURO: CN's intact      WBC No results found for: WBC   HGB No results found for: HGB   HCT No results found for: HCT   Platlets No results found for: LABPLAT   MCV No results found for: MCV       Sodium Sodium   Date Value Ref Range Status   06/02/2021 142 136 - 145 mmol/L Final   06/01/2021 142 136 - 145 mmol/L Final      Potassium Potassium   Date Value Ref Range Status   06/02/2021 4.0 3.5 - 5.2 mmol/L Final   06/01/2021 4.0 3.5 - 5.2 mmol/L Final      Chloride Chloride   Date Value Ref Range Status   06/02/2021 103 98 - 107 mmol/L Final   06/01/2021 104 98 - 107 mmol/L Final      CO2 CO2   Date Value Ref Range Status   06/02/2021 27.0 22.0 - 29.0 mmol/L Final   06/01/2021 28.0 22.0 - 29.0 mmol/L Final      BUN BUN   Date Value Ref Range Status   06/02/2021 43 (H) 8 - 23 mg/dL Final   06/01/2021 43 (H) 8 - 23 mg/dL Final "      Creatinine Creatinine   Date Value Ref Range Status   06/02/2021 1.60 (H) 0.76 - 1.27 mg/dL Final   06/01/2021 1.55 (H) 0.76 - 1.27 mg/dL Final      Calcium Calcium   Date Value Ref Range Status   06/02/2021 8.3 (L) 8.6 - 10.5 mg/dL Final   06/01/2021 8.4 (L) 8.6 - 10.5 mg/dL Final      PO4 No results found for: CAPO4   Albumin No results found for: ALBUMIN   Magnesium Magnesium   Date Value Ref Range Status   06/01/2021 2.0 1.6 - 2.4 mg/dL Final      Uric Acid No results found for: URICACID        Results Review:     I reviewed the patient's new clinical results.    apixaban, 5 mg, Oral, Q12H  aspirin, 81 mg, Oral, Daily  atenolol, 100 mg, Oral, Daily  atorvastatin, 40 mg, Oral, Nightly  cholecalciferol, 1,000 Units, Oral, Daily  clopidogrel, 75 mg, Oral, Daily  digoxin, 125 mcg, Oral, Daily  donepezil, 10 mg, Oral, Nightly  DULoxetine, 60 mg, Oral, Daily  furosemide, 40 mg, Oral, Daily  gabapentin, 200 mg, Oral, Q12H  insulin lispro, 0-14 Units, Subcutaneous, TID AC  insulin NPH-insulin regular, 15 Units, Subcutaneous, BID With Meals  ipratropium, 0.5 mg, Nebulization, 4x Daily - RT  isosorbide mononitrate, 60 mg, Oral, BID  sodium chloride, 3 mL, Intravenous, Q12H  vitamin B-12, 1,000 mcg, Oral, Daily           Medication Review: Reviewed      Assessment/Plan       DILEEP    CKD3    Hypokalemia    CAD    Abnormal nuclear stress test    Elevated troponin    Acute on chronic congestive heart failure (CMS/HCC)    Type 2 diabetes mellitus with hyperglycemia (CMS/[facility])    Cytokine release syndrome, grade 1    Unstable angina (CMS/[facility])      Plan:  Cr has been stable at 1.6 = at baseline.  S/P repeat cardiac cath/PCI.  Volume stable on Lasix.  OK for D/C once rehab bed available. Has been scheduled for outpatient follow-up.  Check BMP in AM if still inpatient.      Yony Bhat MD   [facility]  06/03/21  12:32 EDT

## 2021-06-03 NOTE — PROGRESS NOTES
Referring Provider: Kasandra Ocampo MD    Reason for follow-up:  Shortness of breath  Status post CABG  Status post stent       Patient Care Team:  Samantha Zabala APRN as PCP - General  Samantha Zabala APRN as PCP - Family Medicine  Jose G Rm MD as Consulting Physician (Cardiology)    Subjective .  Feeling better     ROS    Since I have last seen, the patient has been without any chest discomfort ,shortness of breath, palpitations, dizziness or syncope.  Denies having any headache ,abdominal pain ,nausea, vomiting , diarrhea constipation, loss of weight or loss of appetite.  Denies having any excessive bruising ,hematuria or blood in the stool.    Review of all systems negative except as indicated    History  Past Medical History:   Diagnosis Date   • Appetite loss    • Brain bleed (CMS/HCC)    • Carpal tunnel syndrome on left     carpal tunnel on left hand   • Diabetic neuropathy (CMS/HCC)    • DM2 (diabetes mellitus, type 2) (CMS/HCC)    • History of angina    • Hyperlipidemia    • Hypogonadism in male    • Obesity    • Sleep apnea    • Stroke (CMS/HCC)    • Unsteady gait        Past Surgical History:   Procedure Laterality Date   • ANGIOPLASTY      X2   • APPENDECTOMY     • CARDIAC CATHETERIZATION  11/13/2015   • CARDIAC CATHETERIZATION N/A 5/24/2021    Procedure: Left Heart Cath and coronary angiogram;  Surgeon: Jose G Rm MD;  Location: [facility] CATH INVASIVE LOCATION;  Service: Cardiovascular;  Laterality: N/A;   • CARDIAC CATHETERIZATION  5/24/2021    Procedure: Saphenous Vein Graft;  Surgeon: Jose G Rm MD;  Location: [facility] CATH INVASIVE LOCATION;  Service: Cardiovascular;;   • CARDIAC CATHETERIZATION N/A 5/24/2021    Procedure: Percutaneous Coronary Intervention;  Surgeon: Bernabe Zambrano MD;  Location: [facility] CATH INVASIVE LOCATION;  Service: Cardiology;  Laterality: N/A;   • CARDIAC CATHETERIZATION N/A 5/24/2021    Procedure: Stent NIELS coronary;  Surgeon: Bernabe Zambrano MD;  Location:   [facility] CATH INVASIVE LOCATION;  Service: Cardiology;  Laterality: N/A;   • CARDIAC CATHETERIZATION N/A 5/26/2021    Procedure: Percutaneous Coronary Intervention;  Surgeon: Bernabe Zambrano MD;  Location: [facility] CATH INVASIVE LOCATION;  Service: Cardiovascular;  Laterality: N/A;   • CARDIAC CATHETERIZATION N/A 5/26/2021    Procedure: Stent NIELS bypass graft;  Surgeon: Bernabe Zambrano MD;  Location: [facility] CATH INVASIVE LOCATION;  Service: Cardiovascular;  Laterality: N/A;   • CARDIAC ELECTROPHYSIOLOGY PROCEDURE N/A 5/24/2021    Procedure: Temporary Pacemaker;  Surgeon: Bernabe Zambrano MD;  Location:  [facility] CATH INVASIVE LOCATION;  Service: Cardiology;  Laterality: N/A;   • CARDIAC ELECTROPHYSIOLOGY PROCEDURE N/A 5/26/2021    Procedure: Temporary Pacemaker;  Surgeon: Bernabe Zambrano MD;  Location: [facility] CATH INVASIVE LOCATION;  Service: Cardiovascular;  Laterality: N/A;   • CARPAL TUNNEL RELEASE Left 04/29/2018    carpal tunnel- lt hand// other hand surgeries    • CATARACT EXTRACTION, BILATERAL  2002    Dr. Lux Acosta   • CHOLECYSTECTOMY     • COLON RESECTION  2005   • CORONARY ANGIOPLASTY     • CORONARY ANGIOPLASTY WITH STENT PLACEMENT  11/13/2015    PTCA stent to proximal in stent and mid to distal lad   • CORONARY ANGIOPLASTY WITH STENT PLACEMENT  09/16/2016    PTCA stent to mid lad and stent to vein graft to marginal   • CORONARY ARTERY BYPASS GRAFT  2005    @ [facility]   • CYST REMOVAL      cyst removed from scrotum   • FOOT SURGERY Right 07/17/2018   • FOOT SURGERY Left 02/04/2019   • TOE AMPUTATION Right     right toe removed D/T infected cut that went to the bone       Family History   Problem Relation Age of Onset   • Heart disease Mother    • Heart disease Father    • Diabetes Sister    • Heart disease Sister    • Diabetes Brother    • Mental illness Brother        Social History     Tobacco Use   • Smoking status: Former Smoker     Packs/day: 1.00     Types: Cigarettes   • Smokeless tobacco: Never Used   Vaping Use   •  Vaping Use: Never used   Substance Use Topics   • Alcohol use: No   • Drug use: Yes     Frequency: 1.0 times per week     Types: Marijuana     Comment: socially        Medications Prior to Admission   Medication Sig Dispense Refill Last Dose   • apixaban (ELIQUIS) 5 MG tablet tablet Take 1 tablet by mouth Every 12 (Twelve) Hours. Indications: Atrial Fibrillation 60 tablet 0    • aspirin 81 MG chewable tablet Chew 81 mg Daily.      • atenolol (TENORMIN) 100 MG tablet Take 100 mg by mouth Daily.      • atorvastatin (LIPITOR) 40 MG tablet Take 40 mg by mouth Every Night.      • bumetanide (BUMEX) 1 MG tablet Take 1 mg by mouth Every Morning.      • cholecalciferol (VITAMIN D3) 25 MCG (1000 UT) tablet Take 1,000 Units by mouth Daily.      • digoxin (LANOXIN) 125 MCG tablet Take 125 mcg by mouth Daily. In the afternoon      • donepezil (ARICEPT) 10 MG tablet Take 10 mg by mouth Every Night.      • DULoxetine (CYMBALTA) 60 MG capsule Take 60 mg by mouth Daily.      • gabapentin (NEURONTIN) 100 MG capsule Take 200 mg by mouth 2 (two) times a day.      • Glucagon, rDNA, (Glucagon Emergency) 1 MG kit Inject 1 mg as directed 1 (One) Time As Needed (hypoglycemia).      • hydrALAZINE (APRESOLINE) 50 MG tablet Take 50 mg by mouth 2 (two) times a day. Hold for SBP <110      • insulin NPH-insulin regular (humuLIN 70/30,novoLIN 70/30) (70-30) 100 UNIT/ML injection Inject 30 Units under the skin into the appropriate area as directed 2 (Two) Times a Day With Meals.      • isosorbide mononitrate (IMDUR) 60 MG 24 hr tablet Take 60 mg by mouth 2 (Two) Times a Day.      • metFORMIN ER (GLUCOPHAGE-XR) 500 MG 24 hr tablet Take 500 mg by mouth 2 (two) times a day.      • Omega-3 Fatty Acids (fish oil) 1000 MG capsule capsule Take 1,000 mg by mouth Daily.      • Skin Protectants, Misc. (eucerin) cream Apply 1 application topically to the appropriate area as directed 2 (Two) Times a Day As Needed for Dry Skin. To upper & lower extremities     "  • tiotropium (Spiriva HandiHaler) 18 MCG per inhalation capsule Place 1 capsule into inhaler and inhale Daily. 30 capsule 1    • vitamin B-12 (CYANOCOBALAMIN) 1000 MCG tablet Take 1,000 mcg by mouth Daily.      • [DISCONTINUED] HYDROcodone-acetaminophen (NORCO) 7.5-325 MG per tablet Take 1 tablet by mouth Every 12 (Twelve) Hours As Needed for Mild Pain  or Moderate Pain .          Allergies  Codeine    Scheduled Meds:apixaban, 5 mg, Oral, Q12H  aspirin, 81 mg, Oral, Daily  atenolol, 100 mg, Oral, Daily  atorvastatin, 40 mg, Oral, Nightly  cholecalciferol, 1,000 Units, Oral, Daily  clopidogrel, 75 mg, Oral, Daily  digoxin, 125 mcg, Oral, Daily  donepezil, 10 mg, Oral, Nightly  DULoxetine, 60 mg, Oral, Daily  furosemide, 40 mg, Oral, Daily  gabapentin, 200 mg, Oral, Q12H  insulin lispro, 0-14 Units, Subcutaneous, TID AC  insulin NPH-insulin regular, 15 Units, Subcutaneous, BID With Meals  ipratropium, 0.5 mg, Nebulization, 4x Daily - RT  isosorbide mononitrate, 60 mg, Oral, BID  sodium chloride, 3 mL, Intravenous, Q12H  vitamin B-12, 1,000 mcg, Oral, Daily      Continuous Infusions:   PRN Meds:.•  acetaminophen  •  atropine  •  dextrose  •  dextrose  •  glucagon (human recombinant)  •  guaiFENesin-dextromethorphan  •  hydrALAZINE  •  HYDROcodone-acetaminophen  •  insulin lispro **AND** insulin lispro  •  ipratropium-albuterol  •  nitroglycerin  •  ondansetron **OR** ondansetron  •  [COMPLETED] Insert peripheral IV **AND** sodium chloride  •  sodium chloride  •  sodium chloride  •  sodium chloride    Objective     VITAL SIGNS  Vitals:    06/03/21 0500 06/03/21 0552 06/03/21 0641 06/03/21 0646   BP:  141/55     BP Location:  Left arm     Patient Position:  Lying     Pulse:   58 58   Resp:  14 16 16   Temp:  97.7 °F (36.5 °C)     TempSrc:  Oral     SpO2:  94% 98%    Weight: 119 kg (262 lb 12.6 oz)      Height:           Flowsheet Rows      First Filed Value   Admission Height  175.3 cm (69\") Documented at 05/18/2021 " 2219   Admission Weight  122 kg (270 lb) Documented at 05/18/2021 2219            Intake/Output Summary (Last 24 hours) at 6/3/2021 0651  Last data filed at 6/3/2021 0552  Gross per 24 hour   Intake 1200 ml   Output 1550 ml   Net -350 ml        TELEMETRY: Sinus rhythm    Physical Exam:  The patient is alert, oriented and in no distress.  Vital signs as noted above.  Exogenous obesity.  Head and neck revealed no carotid bruits or jugular venous distention.  No thyromegaly or lymphadenopathy is present  Lungs clear.  No wheezing.  Breath sounds are normal bilaterally.  Heart normal first and second heart sounds.  No murmur. No precordial rub is present.  No gallop is present.  Abdomen soft and nontender.  No organomegaly is present.  Extremities with good peripheral pulses.  2+ edema.  Cardiac cath site looks normal.  Skin warm and dry.  Musculoskeletal system is grossly normal  CNS grossly normal      Results Review:   I reviewed the patient's new clinical results.  Lab Results (last 24 hours)     Procedure Component Value Units Date/Time    POC Glucose Once [[id number]]  (Abnormal) Collected: 06/02/21 2002    Specimen: Blood Updated: 06/02/21 2003     Glucose 162 mg/dL      Comment: Serial Number: [id number]:  [id number]       POC Glucose Once [[id number]]  (Abnormal) Collected: 06/02/21 1640    Specimen: Blood Updated: 06/02/21 1641     Glucose 163 mg/dL      Comment: Serial Number: [id number]:  [id number]       POC Glucose Once [[id number]]  (Abnormal) Collected: 06/02/21 1127    Specimen: Blood Updated: 06/02/21 1128     Glucose 226 mg/dL      Comment: Serial Number: [id number]:  [id number]             Imaging Results (Last 24 Hours)     ** No results found for the last 24 hours. **      LAB RESULTS (LAST 7 DAYS)    CBC  Results from last 7 days   Lab Units 05/28/21  0328   WBC 10*3/mm3 6.70   RBC 10*6/mm3 2.80*   HEMOGLOBIN g/dL 8.4*   HEMATOCRIT % 25.4*   MCV fL 90.6   PLATELETS 10*3/mm3 241        BMP  Results from last 7 days   Lab Units 06/02/21 0226 06/01/21 0237 05/31/21 0205 05/29/21 0147 05/28/21 0328 05/27/21  1139   SODIUM mmol/L 142 142 141 142 143 141   POTASSIUM mmol/L 4.0 4.0 4.2 4.0 4.0 4.3   CHLORIDE mmol/L 103 104 101 106 105 104   CO2 mmol/L 27.0 28.0 27.0 26.0 26.0 26.0   BUN mg/dL 43* 43* 44* 44* 46* 45*   CREATININE mg/dL 1.60* 1.55* 1.69* 1.56* 1.56* 1.68*   GLUCOSE mg/dL 135* 92 165* 55* 52* 177*   MAGNESIUM mg/dL  --  2.0 2.1  --   --   --        CMP   Results from last 7 days   Lab Units 06/02/21 0226 06/01/21 0237 05/31/21 0205 05/29/21 0147 05/28/21 0328 05/27/21  1139   SODIUM mmol/L 142 142 141 142 143 141   POTASSIUM mmol/L 4.0 4.0 4.2 4.0 4.0 4.3   CHLORIDE mmol/L 103 104 101 106 105 104   CO2 mmol/L 27.0 28.0 27.0 26.0 26.0 26.0   BUN mg/dL 43* 43* 44* 44* 46* 45*   CREATININE mg/dL 1.60* 1.55* 1.69* 1.56* 1.56* 1.68*   GLUCOSE mg/dL 135* 92 165* 55* 52* 177*         BNP        TROPONIN        CoAg        Creatinine Clearance  Estimated Creatinine Clearance: 54.7 mL/min (A) (by C-G formula based on SCr of 1.6 mg/dL (H)).    ABG        Radiology  No radiology results for the last day            EKG          I personally viewed and interpreted the patient's EKG/Telemetry data: Sinus rhythm nonspecific ST-T wave changes    ECHOCARDIOGRAM:    Results for orders placed during the hospital encounter of 05/06/21    Adult Transthoracic Echo Complete w/ Color, Spectral and Contrast if necessary per protocol    Interpretation Summary  Technically difficult study due to poor acoustic windows, not all left ventricular walls are well visualized therefore Lumason contrast was given to assess LV function..  LVE with basal inferior wall hypokinesis and septal dyskinesis, estimated LV ejection fraction of 35%  Normal RV size  Biatrial enlargement seen.  Aortic valve, mitral valve, tricuspid valve appears structurally normal, no significant regurgitation seen.  No pericardial  effusion seen.  Proximal aorta appears normal in size.          STRESS MYOVIEW:    Cardiolite (Tc-99m Sestamibi) stress test    CARDIAC CATHETERIZATION:            OTHER:         Assessment/Plan     Principal Problem:    Abnormal nuclear stress test  Active Problems:    Elevated troponin    Acute on chronic congestive heart failure (CMS/HCC)    Type 2 diabetes mellitus with hyperglycemia (CMS/HCC)    Cytokine release syndrome, grade 1    Unstable angina (CMS/HCC)      [[[[[[[[[[[[[[[[[[[[[[[[[    Impression  ==============  -Acute on chronic congestive heart failure.  Recent echocardiogram showed left ventricle dysfunction with ejection fraction of 35%.     -History of right-sided weakness with left MCA territory stroke.-Improved      -status post CABG 2005. status post stent to LAD 2009    Status post stent to LAD 11/13/2015  Status post stent to mid LAD and SVG to marginal branch 09/16/2016.  Status post stent to mid LAD 5/24/2021  Status post stent to SVG to RCA 5/26/2021    Cardiac catheterization 5/24/2021 revealed  Left ventricle angiogram was not performed due to pre-existing renal dysfunction.  Left main coronary artery normal.  Left anterior descending artery has mid segment lengthy 90% disease within the previously placed stent.  Distal left into descending artery has diffuse disease.  Circumflex coronary artery is totally occluded.  (Chronic)  Right coronary artery is chronically occluded.  SVG to marginal branch (jump graft to OM1 and OM 2) is totally occluded (new finding compared to 2015.  SVG to PDA has distal radiolucency.  However no definite obstructive disease is present.         -status post myocardial infarction 2000 and 2002 .      -history of intermittent atrial fibrillation-maintaining sinus rhythm     Transesophageal echocardiogram 11/30/2020.  Biatrial enlargement.  Smoking effect in the left atrium and left ventricle and left atrial appendage.  Mild mitral and aortic regurgitation.  Left  ventricle enlargement with diffuse hypocontractility with ejection fraction of 35 to 40%.     Echocardiogram 11/27/2020 revealed left atrial enlargement left ventricle dysfunction with ejection fraction of 40%.  Negative bubble study.      -diabetes hypertension and sleep apnea in history of renal dysfunction .  Recent BUN/creatinine 38/1.36     -status post colon surgery (partial colectomy) appendectomy cholecystectomy right 4th toe removal and carpal tunnel surgery      -continued smoking 1 pack per day -abstinence from smoking      -allergy to codeine.  ============   Plan  ================  Status post CABG  Abnormal stress Cardiolite test.    Cardiac catheterization 5/24/2021 revealed  Patient had intervention to left anterior descending artery.  5/24/2021  Spontaneous prolonged episode of asystole is of concern.  Patient had recovered without any intervention or CPR.  We will closely observe for any bradycardia arrhythmias and patient may need pacemaker/ICD implantation.    Status post stent to LAD 5/24/2021.  Status post stent to SVG to RCA 5/26/2021    Left ventricle dysfunction with ejection fraction of 35%.  Patient has acute on chronic congestive heart failure. Compensated at this time.     Paroxysmal atrial fibrillation.  Patient is mostly in sinus rhythm.    Renal dysfunction stable  BUN 51 creatinine 2.0.  54/2.14  51/2.01  52/1.79  48/1.7-5/23/2021  48/1.79-5/25/2021  48/1.84-5/26/2021  46/1.56-5/28/2021  44/1.56-5/29/2021  44/1.69-5/31/2021  43/1.55-6/1/2021  43/1.6-6/2/2021     Anemia  Hemoglobin 8.8  8.5  8.4-5/28/2021     Anticoagulation  Patient is on Eliquis.    Medications were reviewed and updated.    Waiting for transfer to rehab.    Follow-up in the office in 2 weeks.    Current medications include Eliquis aspirin atenolol atorvastatin Plavix Lanoxin isosorbide.    Follow-up labs ordered.    Further plan will depend on patient's progress.   ]]]]]]]]]]]]]]]]]]]]]]              Jose G  MD Sujey  06/03/21  06:51 EDT

## 2021-06-03 NOTE — PLAN OF CARE
Goal Outcome Evaluation:  Plan of Care Reviewed With: patient  Progress: improving  Outcome Summary: Pt waiting for precert for rehab.  no s/s of distress.  VSS.  cont to monitor.

## 2021-06-03 NOTE — CASE MANAGEMENT/SOCIAL WORK
Continued Stay Note   Brennan     Patient Name: Benson Mclean  MRN: [id number]  Today's Date: 6/3/2021    Admit Date: 5/18/2021    Discharge Plan     Row Name 06/03/21 1421       Plan    Plan  D/C Plan : [facility] accepts. Pre-cert restarted again on 6/2, per [facility] liaison. PASRR approved. Pharmacy is updated to [facility] for [facility].    Plan Comments  SW requested update from [facility] liaison regarding pre-cert, which had been requested to restart on 6/2, and also inquired if the pt could potentialy admit under his Medicaid while pre-cert is in process. Per later update from [facility] liaison, pt is able to admit to [facility] today. TANA updated MD, RN, and CM via secure chat.        Phone communication or documentation only - no physical contact with patient or family.    Danielle Perry [facility], [facility]    Office: [phone]  Cell: [phone]  Fax: [phone]  E-mail: [email or web address]

## 2021-06-03 NOTE — DISCHARGE SUMMARY
[facility] Medicine Services  DISCHARGE SUMMARY        Prepared For PCP:  Samantha Zabala APRN    Patient Name: Benson Mclean  : 1950  MRN: [id number]      Date of Admission:   2021    Date of Discharge:  6/3/2021    Length of stay:  LOS: 11 days     Hospital Course     Presenting Problem:   Hypoxia [R09.02]  Chronic anemia [D64.9]  Atrial fibrillation, unspecified type (CMS/HCC) [I48.91]  Chronic renal failure, unspecified CKD stage [N18.9]  Acute on chronic congestive heart failure, unspecified heart failure type (CMS/HCC) [I50.9]      Active Hospital Problems    Diagnosis  POA   • **Abnormal nuclear stress test [R94.39]  Unknown   • Acute on chronic congestive heart failure (CMS/HCC) [I50.9]  Yes     Priority: High   • Type 2 diabetes mellitus with hyperglycemia (CMS/HCC) [E11.65]  Yes     Priority: High   • Elevated troponin [R77.8]  Yes     Priority: High   • Unstable angina (CMS/[facility]) [I20.0]  Unknown   • Cytokine release syndrome, 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     Hospital Course:  Benson Mclean is a 70 y.o. male who presented to the emergency room with worsening shortness of breath.  The patient had recently been hospitalized from 2021 to 2021 for acute on chronic heart failure.  The patient was admitted and seen by cardiology.  The patient had a stress test on 2021 that was abnormal showing inferior and posterior lateral ischemia.  He then underwent a heart cath on 2021 with stent to the LAD complicated with sinus pause which resolved.  The patient then had a second heart cath on 2021 with a stent to the SVG to the RCA.  Patient did well with these procedures but had had a decline in terms of his ability to do his activities of daily living.  The patient was then seen by physical and Occupational Therapy and case management started working with the patient to find an acceptable  inpatient rehab facility.  Today patient was accepted by [facility] and has been cleared by his consultants to continue down his road to recovery.    The patient is a full code at the time of discharge.  The patient's medications are as listed in that section of this report.  The patient is on a cardiac consistent carbohydrate diet with glucose monitoring before every meal and at bedtime.  The patient has no pending studies at discharge.  The patient should follow-up with nephrology and cardiology as per their instructions and with his primary care provider in 3 to 4 days.  The patient is discharged in satisfactory condition.    Recommendation for Outpatient Providers:     Reasons For Change In Medications and Indications for New Medications:    Day of Discharge     HPI:   The patient is feeling better today.  He was discouraged earlier because he has yet to get discharged into a rehab facility so that he can move forward with his life.  Fortunately that has remedied itself and he is ready for discharge.    Vital Signs:   Temp:  [97.7 °F (36.5 °C)-99.2 °F (37.3 °C)] 97.7 °F (36.5 °C)  Heart Rate:  [53-94] 58  Resp:  [14-23] 20  BP: (135-181)/(55-99) 137/62     Physical Exam:  Physical Exam  Vitals and nursing note reviewed.   Constitutional:       General: He is not in acute distress.     Appearance: Normal appearance. He is well-developed. He is not ill-appearing, toxic-appearing or diaphoretic.   HENT:      Head: Normocephalic and atraumatic.      Right Ear: Ear canal and external ear normal.      Left Ear: Ear canal and external ear normal.      Nose: Nose normal. No congestion or rhinorrhea.      Mouth/Throat:      Mouth: Mucous membranes are moist.      Pharynx: No oropharyngeal exudate.   Eyes:      General: No scleral icterus.        Right eye: No discharge.         Left eye: No discharge.      Extraocular Movements: Extraocular movements intact.      Conjunctiva/sclera: Conjunctivae normal.       Pupils: Pupils are equal, round, and reactive to light.   Neck:      Thyroid: No thyromegaly.      Vascular: No carotid bruit or JVD.      Trachea: No tracheal deviation.   Cardiovascular:      Rate and Rhythm: Normal rate and regular rhythm.      Pulses: Normal pulses.      Heart sounds: Normal heart sounds. No murmur heard.   No friction rub. No gallop.    Pulmonary:      Effort: Pulmonary effort is normal. No respiratory distress.      Breath sounds: Normal breath sounds. No stridor. No wheezing, rhonchi or rales.   Chest:      Chest wall: No tenderness.   Abdominal:      General: Bowel sounds are normal. There is no distension.      Palpations: Abdomen is soft. There is no mass.      Tenderness: There is no abdominal tenderness. There is no guarding or rebound.      Hernia: No hernia is present.   Musculoskeletal:         General: No swelling, tenderness, deformity or signs of injury. Normal range of motion.      Cervical back: Normal range of motion and neck supple. No rigidity. No muscular tenderness.      Right lower leg: No edema.      Left lower leg: No edema.   Lymphadenopathy:      Cervical: No cervical adenopathy.   Skin:     General: Skin is warm and dry.      Coloration: Skin is not jaundiced or pale.      Findings: No bruising, erythema or rash.   Neurological:      General: No focal deficit present.      Mental Status: He is alert and oriented to person, place, and time. Mental status is at baseline.      Cranial Nerves: No cranial nerve deficit.      Sensory: No sensory deficit.      Motor: No weakness or abnormal muscle tone.      Coordination: Coordination normal.   Psychiatric:         Mood and Affect: Mood normal.         Behavior: Behavior normal.         Thought Content: Thought content normal.         Judgment: Judgment normal.       Pertinent  and/or Most Recent Results     Results from last 7 days   Lab Units 06/02/21  0226 06/01/21  0237 05/31/21  0205 05/29/21  0147 05/28/21  0328    WBC 10*3/mm3  --   --   --   --  6.70   HEMOGLOBIN g/dL  --   --   --   --  8.4*   HEMATOCRIT %  --   --   --   --  25.4*   PLATELETS 10*3/mm3  --   --   --   --  241   SODIUM mmol/L 142 142 141 142 143   POTASSIUM mmol/L 4.0 4.0 4.2 4.0 4.0   CHLORIDE mmol/L 103 104 101 106 105   CO2 mmol/L 27.0 28.0 27.0 26.0 26.0   BUN mg/dL 43* 43* 44* 44* 46*   CREATININE mg/dL 1.60* 1.55* 1.69* 1.56* 1.56*   GLUCOSE mg/dL 135* 92 165* 55* 52*   CALCIUM mg/dL 8.3* 8.4* 8.8 8.1* 8.3*           Invalid input(s): PROT, LABALBU        Invalid input(s): TG, LDLCALC, LDLREALC        Brief Urine Lab Results     None        Microbiology Results Abnormal     Procedure Component Value - Date/Time    COVID-19, ABBOTT IN-HOUSE,NASAL Swab (NO TRANSPORT MEDIA) 2 HR TAT - Swab, Nasopharynx [[id number]]  (Normal) Collected: 05/18/21 2332    Lab Status: Final result Specimen: Swab from Nasopharynx Updated: 05/18/21 2358     COVID19 Presumptive Negative    Narrative:      Fact sheet for providers: https://www.fda.gov/media/398258/download     Fact sheet for patients: https://www.fda.gov/media/074071/download    Test performed by PCR.  If inconsistent with clinical signs and symptoms patient should be tested with different authorized molecular test.        CT Head Without Contrast    Result Date: 5/8/2021  Impression: 1.No acute intracranial abnormality/hemorrhage. 2.Remote infarcts, as described above. 3.Mild global volume loss and mild probable chronic small vessel ischemic change.  Electronically Signed By-Samantha Navarro MD On:5/8/2021 1:14 PM This report was finalized on [id number] by  Samantha Navarro MD.    XR Chest 1 View    Result Date: 5/19/2021  Impression:  1. Cardiomegaly. 2. Bilateral mixed interstitial/airspace disease which can be seen with pulmonary edema or multifocal pneumonia. 3. New trace bilateral basilar pleural effusions.  Electronically Signed By-Salinas Chinchilla MD On:5/19/2021 7:31 AM This report was finalized on  [id number] by  Salinas Chinchilla MD.    XR Chest 1 View    Result Date: 5/6/2021  Impression: No active disease. Stable chest  Electronically Signed By-Davis Dee MD On:5/6/2021 6:51 PM This report was finalized on [id number] by  Davis Dee MD.        Results for orders placed during the hospital encounter of 05/06/21    Adult Transthoracic Echo Complete w/ Color, Spectral and Contrast if necessary per protocol    Interpretation Summary  Technically difficult study due to poor acoustic windows, not all left ventricular walls are well visualized therefore Lumason contrast was given to assess LV function..  LVE with basal inferior wall hypokinesis and septal dyskinesis, estimated LV ejection fraction of 35%  Normal RV size  Biatrial enlargement seen.  Aortic valve, mitral valve, tricuspid valve appears structurally normal, no significant regurgitation seen.  No pericardial effusion seen.  Proximal aorta appears normal in size.    Test Results Pending at Discharge    Procedures Performed  Procedure(s):  Percutaneous Coronary Intervention  Stent NIELS bypass graft  Temporary Pacemaker    Consults:   Consults     Date and Time Order Name Status Description    5/21/2021  4:05 PM Inpatient Nephrology Consult Completed     5/19/2021  1:03 AM Inpatient Cardiology Consult Completed     5/18/2021 11:48 PM Hospitalist (on-call MD unless specified) Completed     5/7/2021  7:48 PM Inpatient Neurology Consult Other (see comments) Completed     5/6/2021  9:12 PM Inpatient Cardiology Consult Completed     5/6/2021  8:23 PM Hospitalist (on-call MD unless specified) Completed         Discharge Details        Discharge Medications      New Medications      Instructions Start Date   clopidogrel 75 MG tablet  Commonly known as: PLAVIX   75 mg, Oral, Daily   Start Date: June 4, 2021     furosemide 40 MG tablet  Commonly known as: LASIX   40 mg, Oral, Daily   Start Date: June 4, 2021     guaiFENesin-dextromethorphan 100-10 MG/5ML  syrup  Commonly known as: ROBITUSSIN DM   5 mL, Oral, Every 4 Hours PRN         Changes to Medications      Instructions Start Date   HYDROcodone-acetaminophen 7.5-325 MG per tablet  Commonly known as: NORCO  What changed: reasons to take this   1 tablet, Oral, Every 12 Hours PRN         Continue These Medications      Instructions Start Date   apixaban 5 MG tablet tablet  Commonly known as: ELIQUIS   5 mg, Oral, Every 12 Hours Scheduled      aspirin 81 MG chewable tablet   81 mg, Oral, Daily      atenolol 100 MG tablet  Commonly known as: TENORMIN   100 mg, Oral, Daily      atorvastatin 40 MG tablet  Commonly known as: LIPITOR   40 mg, Oral, Nightly      cholecalciferol 25 MCG (1000 UT) tablet  Commonly known as: VITAMIN D3   1,000 Units, Oral, Daily      digoxin 125 MCG tablet  Commonly known as: LANOXIN   125 mcg, Oral, Daily Digoxin, In the afternoon      donepezil 10 MG tablet  Commonly known as: ARICEPT   10 mg, Oral, Nightly      DULoxetine 60 MG capsule  Commonly known as: CYMBALTA   60 mg, Oral, Daily      fish oil 1000 MG capsule capsule   1,000 mg, Oral, Daily      gabapentin 100 MG capsule  Commonly known as: NEURONTIN   200 mg, Oral, 2 times daily      Glucagon Emergency 1 MG kit   1 mg, Injection, Once As Needed      hydrALAZINE 50 MG tablet  Commonly known as: APRESOLINE   50 mg, Oral, 2 times daily, Hold for SBP <110       insulin NPH-insulin regular (70-30) 100 UNIT/ML injection  Commonly known as: humuLIN 70/30,novoLIN 70/30   30 Units, Subcutaneous, 2 Times Daily With Meals      isosorbide mononitrate 60 MG 24 hr tablet  Commonly known as: IMDUR   60 mg, Oral, 2 Times Daily      metFORMIN  MG 24 hr tablet  Commonly known as: GLUCOPHAGE-XR   500 mg, Oral, 2 times daily      tiotropium 18 MCG per inhalation capsule  Commonly known as: Spiriva HandiHaler   1 capsule, Inhalation, Daily - RT      vitamin B-12 1000 MCG tablet  Commonly known as: CYANOCOBALAMIN   1,000 mcg, Oral, Daily         Stop  These Medications    bumetanide 1 MG tablet  Commonly known as: BUMEX     eucerin cream        ASK your doctor about these medications      Instructions Start Date   Acetylcysteine capsule capsule  Ask about: Should I take this medication?   600 mg, Oral, 2 Times Daily             Allergies   Allergen Reactions   • Codeine Itching         Discharge Disposition:  Skilled Nursing Facility (DC - External)    Diet:  Hospital:  Diet Order   Procedures   • Diet Diabetic/Consistent Carbs, Cardiac; Healthy Heart; Diabetic - Consistent Carb         Discharge Activity:   Activity Instructions     As tolerated              CODE STATUS:    Code Status and Medical Interventions:   Ordered at: 05/19/21 0103     Code Status:    CPR     Medical Interventions (Level of Support Prior to Arrest):    Full         Follow-up Appointments  Future Appointments   Date Time Provider Department Center   9/16/2021  1:00 PM HERI Chung [facility]       Additional Instructions for the Follow-ups that You Need to Schedule     Ambulatory Referral to Home Health   As directed      Face to Face Visit Date: 5/19/2021    Follow-up provider for Plan of Care?: I treated the patient in an acute care facility and will not continue treatment after discharge.    Follow-up provider: KJ ZABALA [[id number]]    Reason/Clinical Findings: post hospital evaluation    Describe mobility limitations that make leaving home difficult: weakness    Nursing/Therapeutic Services Requested: Other (NEETU order)    Frequency: 1 Week 1         Discharge Follow-up with PCP   As directed       Currently Documented PCP:    Kj Zabala APRN    PCP Phone Number:    [phone]     Follow Up Details: 2 weeks                 Condition on Discharge:      Stable      This patient has been examined wearing appropriate Personal Protective Equipment and discussed with hospital infection control department. 06/03/21      Electronically signed by Kasandra Ocampo MD,  06/03/21, 4:39 PM EDT.      Time: I spent  40 minutes on this discharge activity which included face-to-face encounter with the patient/reviewing the data in the system/coordination of the care with the nursing staff as well as consultants/documentation/entering orders.

## 2021-06-03 NOTE — CONSULTS
Nutrition Services    Patient Name: Benson Mclean  YOB: 1950  MRN: [id number]  Admission date: 5/18/2021    Will continue diet with Boost Glucose Control as ordered. No additional nutrition interventions indicated presently.     PPE Documentation        PPE Worn By Provider Mask, protective eyewear   PPE Worn By Patient  None     CLINICAL NUTRITION ASSESSMENT       Reason for Assessment 6/3: Follow up protocol   5/27: LOS x 9 days     H&P:      Past Medical History:   Diagnosis Date   • Appetite loss    • Brain bleed (CMS/[facility])    • Carpal tunnel syndrome on left     carpal tunnel on left hand   • Diabetic neuropathy (CMS/[facility])    • DM2 (diabetes mellitus, type 2) (CMS/[facility])    • History of angina    • Hyperlipidemia    • Hypogonadism in male    • Obesity    • Sleep apnea    • Stroke (CMS/[facility])    • Unsteady gait        Past Surgical History:   Procedure Laterality Date   • ANGIOPLASTY      X2   • APPENDECTOMY     • CARDIAC CATHETERIZATION  11/13/2015   • CARDIAC CATHETERIZATION N/A 5/24/2021    Procedure: Left Heart Cath and coronary angiogram;  Surgeon: Jose G Rm MD;  Location: [facility] CATH INVASIVE LOCATION;  Service: Cardiovascular;  Laterality: N/A;   • CARDIAC CATHETERIZATION  5/24/2021    Procedure: Saphenous Vein Graft;  Surgeon: Jose G Rm MD;  Location: [facility] CATH INVASIVE LOCATION;  Service: Cardiovascular;;   • CARDIAC CATHETERIZATION N/A 5/24/2021    Procedure: Percutaneous Coronary Intervention;  Surgeon: Bernabe Zambrano MD;  Location: [facility] CATH INVASIVE LOCATION;  Service: Cardiology;  Laterality: N/A;   • CARDIAC CATHETERIZATION N/A 5/24/2021    Procedure: Stent NIELS coronary;  Surgeon: Bernabe Zambrano MD;  Location: [facility] CATH INVASIVE LOCATION;  Service: Cardiology;  Laterality: N/A;   • CARDIAC CATHETERIZATION N/A 5/26/2021    Procedure: Percutaneous Coronary Intervention;  Surgeon: Bernabe Zambrano MD;  Location: [facility] CATH INVASIVE LOCATION;  Service: Cardiovascular;   Laterality: N/A;   • CARDIAC CATHETERIZATION N/A 5/26/2021    Procedure: Stent NIELS bypass graft;  Surgeon: Bernabe Zambrano MD;  Location: [facility] CATH INVASIVE LOCATION;  Service: Cardiovascular;  Laterality: N/A;   • CARDIAC ELECTROPHYSIOLOGY PROCEDURE N/A 5/24/2021    Procedure: Temporary Pacemaker;  Surgeon: Bernabe Zambrano MD;  Location: [facility] CATH INVASIVE LOCATION;  Service: Cardiology;  Laterality: N/A;   • CARDIAC ELECTROPHYSIOLOGY PROCEDURE N/A 5/26/2021    Procedure: Temporary Pacemaker;  Surgeon: Bernabe Zambrano MD;  Location: [facility] CATH INVASIVE LOCATION;  Service: Cardiovascular;  Laterality: N/A;   • CARPAL TUNNEL RELEASE Left 04/29/2018    carpal tunnel- lt hand// other hand surgeries    • CATARACT EXTRACTION, BILATERAL  2002    Dr. Lux Acosta   • CHOLECYSTECTOMY     • COLON RESECTION  2005   • CORONARY ANGIOPLASTY     • CORONARY ANGIOPLASTY WITH STENT PLACEMENT  11/13/2015    PTCA stent to proximal in stent and mid to distal lad   • CORONARY ANGIOPLASTY WITH STENT PLACEMENT  09/16/2016    PTCA stent to mid lad and stent to vein graft to marginal   • CORONARY ARTERY BYPASS GRAFT  2005    @ [facility]   • CYST REMOVAL      cyst removed from scrotum   • FOOT SURGERY Right 07/17/2018   • FOOT SURGERY Left 02/04/2019   • TOE AMPUTATION Right     right toe removed D/T infected cut that went to the bone        Current Problems:   Acute on chronic systolic heart failure; abnormal stress test; elevated troponin  - cardiology following     DILEEP on CKD 3   - nephrology following   - chronic anemia associated with this    CAD SP CABG and multiple PCI; intermittent A Fib  - continue son aspirin, atenolol, and statin   - eliquis at discharge     Dementia, depression, HTN, chronic pain   -continues home meds     Hx CVA with R sided deficit  - on aspirin, statin    OA   -uses BiPap at night      Nutrition/Diet History         Narrative     6/3: Pt continues on PO diet and is tolerating well. Observed very good intake from  "tray in room; documented intakes also confirm intake generally 75% or better at all recent meals. Weight stable. Will continue diet and oral nutrition supplements as tolerated.     5/27: RD visited patient due to LOS x 9 days. Patient was drowsy and could not answer questions, did not conduct full interview at this time. Patient has dementia noted in chart. Pt is aware that he has diabetes and states he uses insulin at home. Pt states hes never had diabetes education in past, though it is documented in [facility] x 3 since 2018. Patient not appropriate for any nutritional education. RD asked if wife is able to visit to speak with her about his nutritional needs, and he states wife uses a walker and can not come out to visit. Patient states wife does the cooking and a typical meals consists of meat, mashed potatoes and sometimes green beans.  No further information obtain. NFPE reveals no signs of malnutrition. Patient eating well and having BM. Patient is to be d/c to rehab, recommend continue consistent carbohydrate diet.     Functional Status Assistance needed for ADL's - self feed     Food Allergies NKFA     Factors Affecting   Nutritional Intake Dementia     Anthropometrics        Current Height, Weight Height: 175.3 cm (69.02\")  Weight: 119 kg (262 lb 12.6 oz) (06/03/21 0500)        Admit Height, Weight -    Flowsheet Rows      First Filed Value   Admission Height  175.3 cm (69\") Documented at 05/18/2021 2219   Admission Weight  122 kg (270 lb) Documented at 05/18/2021 2219             Ideal Body Weight (IBW) 151lbs (68kg)   % Ideal Body Weight 174%       Usual Body Weight UTD   % Usual Body Weight UTD   Wt Change Observation 6/3: 0.8% weight gain since prior assessment   5/27: Weight consistent with weight 2 yrs ago. 4% weight loss this admission. Possibly due to over consumption of high kcal food at home, and hypermetabolic state of COPD exacerbation   Weight Hx    Wt Readings from Last 30 Encounters:   06/03/21 " 0500 119 kg (262 lb 12.6 oz)   06/02/21 0506 118 kg (260 lb 2.3 oz)   06/01/21 0528 121 kg (267 lb 13.7 oz)   05/31/21 0500 122 kg (269 lb 6.4 oz)   05/30/21 0500 121 kg (266 lb 8.6 oz)   05/29/21 0500 122 kg (268 lb 15.4 oz)   05/29/21 0305 122 kg (268 lb 15.4 oz)   05/28/21 0504 122 kg (268 lb 8.3 oz)   05/26/21 0449 118 kg (260 lb 12.9 oz)   05/24/21 0323 119 kg (262 lb 9.1 oz)   05/22/21 2100 125 kg (276 lb 3.2 oz)   05/21/21 0349 125 kg (276 lb 7.3 oz)   05/20/21 0439 127 kg (280 lb 6.8 oz)   05/19/21 0202 127 kg (281 lb 1.4 oz)   05/18/21 2219 122 kg (270 lb)   05/08/21 0511 116 kg (256 lb 13.4 oz)   05/07/21 0606 118 kg (261 lb)   05/07/21 0456 118 kg (261 lb 0.4 oz)   05/06/21 2304 118 kg (261 lb 0.4 oz)   05/06/21 1720 122 kg (270 lb)   03/16/21 1141 125 kg (276 lb)   11/30/20 0557 125 kg (276 lb 7.3 oz)   11/29/20 0615 126 kg (278 lb 7.1 oz)   11/28/20 0635 124 kg (273 lb 5.9 oz)   11/27/20 1611 120 kg (265 lb)   11/27/20 0801 120 kg (265 lb)   11/16/20 0918 120 kg (265 lb)   09/23/20 1047 128 kg (282 lb)   08/24/20 0617 128 kg (282 lb 3 oz)   08/23/20 1212 126 kg (276 lb 14.4 oz)   08/23/20 0932 120 kg (265 lb)   01/23/20 1100 129 kg (284 lb 8 oz)   08/07/19 1413 129 kg (284 lb)   07/26/19 1957 126 kg (278 lb 10.6 oz)   06/26/19 0832 130 kg (285 lb 9.6 oz)   06/22/19 2034 130 kg (287 lb)   06/05/19 1011 129 kg (285 lb)   05/15/19 0850 129 kg (285 lb)   04/24/19 0837 128 kg (283 lb)   04/03/19 0824 127 kg (280 lb)   03/20/19 1106 127 kg (279 lb)   03/06/19 1100 126 kg (278 lb)   02/20/19 0959 126 kg (278 lb)   01/28/19 0820 126 kg (278 lb)   01/15/19 0839 126 kg (278 lb)   12/26/18 1241 126 kg (278 lb)   12/11/18 0935 126 kg (278 lb)   11/29/18 1020 125 kg (275 lb)   11/13/18 1037 125 kg (275 lb)   11/08/18 1436 118 kg (260 lb)   10/22/18 0928 118 kg (260 lb)   10/09/18 0903 118 kg (260 lb)   09/20/18 0802 118 kg (260 lb)   08/30/18 1238 118 kg (260 lb)        BMI kg/m2 Body mass index is 38.79 kg/m².    "    Labs/Medications         Pertinent Labs -   Results from last 7 days   Lab Units 06/02/21  0226 06/01/21  0237 05/31/21  0205   SODIUM mmol/L 142 142 141   POTASSIUM mmol/L 4.0 4.0 4.2   CHLORIDE mmol/L 103 104 101   CO2 mmol/L 27.0 28.0 27.0   BUN mg/dL 43* 43* 44*   CREATININE mg/dL 1.60* 1.55* 1.69*   CALCIUM mg/dL 8.3* 8.4* 8.8   GLUCOSE mg/dL 135* 92 165*     Results from last 7 days   Lab Units 06/01/21  0237 05/31/21  0205 05/28/21  0328   MAGNESIUM mg/dL 2.0 2.1  --    HEMOGLOBIN g/dL  --   --  8.4*   HEMATOCRIT %  --   --  25.4*     COVID19   Date Value Ref Range Status   05/18/2021 Presumptive Negative Presumptive Negative - Ref. Range Final     Lab Results   Component Value Date    HGBA1C 7.2 (H) 05/19/2021         Pertinent Medications Eliquis, aspirin, tenormin, lipitor, vitamin D, plavix, lanoxin, aricept, cymbalta, lasix, neurontin, admelog, insulin, imdur, B-12     Physical Findings        Overall Physical   Appearance, MSA 6/3: Continues adequately nourished   5/27: NFPE revealed no signs of malnutrition at this time.   -  Edema  +1 knees, ankles, feet      Gastrointestinal BM 6/2     Tubes No feeding tubes     Oral/Mouth Cavity WNL     Skin Intact       Estimated/Assessed Needs       Energy Requirements    Height for Calculation  Height: 175.3 cm (69.02\")   Weight for Calculation -   Method for Estimation  -   EST Needs (kcal/day) -       Protein Requirements    Weight for Calculation -   EST Protein Needs (g/kg) -   EST Daily Needs (g/day) -       Fluid Requirements     Estimated Needs (mL/day) -       Fluid Deficit    Current Na Level (mEq/L) -   Desired Na Level (mEq/L) -   Estimated Fluid Deficit (L)  -     Current Nutrition Orders & Evaluation of Received Nutrient/Fluid Intake       Oral Nutrition     Current PO Diet Diet Diabetic/Consistent Carbs, Cardiac; Healthy Heart; Diabetic - Consistent Carb   Supplement   Boost Glucose Control once daily    PO Evaluation     % PO Intake 6/3: Pt " continues to eat 100% at almost all meals; tolerating well   % full meals   -  Enteral Nutrition    Enteral Route -   TF Modular -   TF Delivery Method -   Current Ordered TF  -   Current Ordered H2O flush  -    TF Observation  -   EN Evaluation     TF Changes -    TF Residual -    TF Tolerance -    Average EN Delivered -       Parenteral Nutrition     TPN Route -   Current Ordered TPN VOL  -   Dextrose (g/kcals)  -   Amino Acid (g/kcals) -   Lipids (mL/Concentration/FREQ)  -   MVI Frequency  -   Trace Element Frequency  -   TPN Observation  -    TPN Evaluation    Total # Days on TPN -   -  Clinical Course    Nutrition Course Details 5/18-current (6/3): CC, HH with NPO at times for procedures     Nutritional Risk Screening        NRS-2002 Score   -       Nutrition Diagnosis         Nutrition Dx Problem 1 Altered nutrition-related laboratory values related to endocrine disorder; as evidenced by hyperglycemia with Hx DM.       Nutrition Dx Problem 2 -       Intervention Goal         Intervention Goal(s) Intake continues 75% or better at meals   Patient continues to accept ONS     Nutrition Intervention        RD/Tech Action Boost Glucose Control once daily  Continue current consistent carbohydrate diet       Nutrition Prescription          Diet Prescription Consistent carbohydrate, healthy heart diet    Supplement Prescription Boost Glucose Control once daily   -  Enteral Prescription -       TPN Prescription -     Monitor/Evaluation        Monitor Weight, BS, BM labs, intakes     Electronically signed by:  Noy Albert RD  06/03/21 13:30 EDT

## 2021-06-03 NOTE — PLAN OF CARE
Goal Outcome Evaluation:   [facility] accepted & pt has bed available at [facility] this date per CM note. Will FU tomorrow for 2-week OT re-evaluation if pt has not discharged. RN anticipates discharge.

## 2021-06-07 NOTE — CASE MANAGEMENT/SOCIAL WORK
Case Management Discharge Note      Final Note: [facility]    Provided Post Acute Provider List?: Yes  Post Acute Provider List: Inpatient Rehab  N/A Provider List Comment: current with [facility]  Delivered To: Patient  Method of Delivery: In person    Selected Continued Care - Discharged on 6/3/2021 Admission date: 5/18/2021 - Discharge disposition: Skilled Nursing Facility (DC - External)        Home Medical Care Coordination complete    Service Provider Selected Services Address Phone Fax Patient Preferred    [facility] [address] [place], [place] [address] [phone] --                       Final Discharge Disposition Code: 03 - skilled nursing facility (SNF)

## 2021-06-10 LAB — QT INTERVAL: 433 MS

## 2021-06-16 ENCOUNTER — TELEPHONE (OUTPATIENT)
Dept: CARDIOLOGY | Facility: CLINIC | Age: 71
End: 2021-06-16

## 2021-06-16 NOTE — TELEPHONE ENCOUNTER
RHIANNON [phone] EXT [phone]    PATIENT WAS ON NEBULIZER AT HOSPITAL. WAS SENT TO FACILITY WITH OUT ANYTHING. ASKING IF DR. VENCES CAN TAKE CARE OF THAT? I TOLD HER I DID NOT KNOW IF DR. VENCES WAS THE ONE MANAGING THE NEBULIZER BUT WOULD CHECK.

## 2021-06-17 ENCOUNTER — TELEPHONE (OUTPATIENT)
Dept: CARDIAC REHAB | Facility: HOSPITAL | Age: 71
End: 2021-06-17

## 2021-06-17 NOTE — TELEPHONE ENCOUNTER
Called pt to follow up with. Says he is still in Rehab facility and doing ok. Pt not a candidate for Cardiac Rehab. Social call only.

## 2021-06-24 ENCOUNTER — OFFICE VISIT (OUTPATIENT)
Dept: CARDIOLOGY | Facility: CLINIC | Age: 71
End: 2021-06-24

## 2021-06-24 VITALS
BODY MASS INDEX: 39.96 KG/M2 | DIASTOLIC BLOOD PRESSURE: 75 MMHG | HEIGHT: 68 IN | OXYGEN SATURATION: 97 % | SYSTOLIC BLOOD PRESSURE: 147 MMHG | HEART RATE: 69 BPM

## 2021-06-24 DIAGNOSIS — E78.5 DYSLIPIDEMIA: ICD-10-CM

## 2021-06-24 DIAGNOSIS — Z95.1 HX OF CABG: Primary | ICD-10-CM

## 2021-06-24 DIAGNOSIS — E08.9 DIABETES MELLITUS DUE TO UNDERLYING CONDITION WITHOUT COMPLICATION, WITHOUT LONG-TERM CURRENT USE OF INSULIN (HCC): ICD-10-CM

## 2021-06-24 DIAGNOSIS — I10 ESSENTIAL HYPERTENSION: ICD-10-CM

## 2021-06-24 PROCEDURE — 99214 OFFICE O/P EST MOD 30 MIN: CPT | Performed by: INTERNAL MEDICINE

## 2021-06-24 RX ORDER — ALBUTEROL SULFATE 90 UG/1
2 AEROSOL, METERED RESPIRATORY (INHALATION) EVERY 4 HOURS PRN
Status: ON HOLD | COMMUNITY
Start: 2021-05-01 | End: 2022-01-01

## 2021-06-24 RX ORDER — ONDANSETRON 4 MG/1
4 TABLET, FILM COATED ORAL EVERY 6 HOURS PRN
COMMUNITY
End: 2021-07-21

## 2021-06-24 NOTE — PROGRESS NOTES
Encounter Date:06/24/2021  Last seen 6/3/2021      Patient ID: Benson Mclean is a 70 y.o. male.    Chief Complaint:  Status post CABG  Status post stent  Intermittent atrial fibrillation  Anticoagulation management  Hypertension  Diabetes  Renal dysfunction      History of Present Illness  Patient recently was admitted with shortness of breath and acute on chronic congestive heart failure.  Patient had a diuresis and subsequently was started on anticoagulation and was discharged home.  Patient is now at a nursing home facility.    Since I have last seen, the patient has been without any chest discomfort , unusual shortness of breath, palpitations, dizziness or syncope.  Denies having any headache ,abdominal pain ,nausea, vomiting , diarrhea constipation, loss of weight or loss of appetite.  Denies having any excessive bruising ,hematuria or blood in the stool.    Review of all systems negative except as indicated.    Reviewed ROS.    Assessment and Plan     [[[[[[[[[[[[[[[[[[[[[[[[[    Impression  ==============  -Acute on chronic congestive heart failure.-Compensated  Recent echocardiogram showed left ventricle dysfunction with ejection fraction of 35%.     -History of right-sided weakness with left MCA territory stroke.-Improved      -status post CABG 2005. status post stent to LAD 2009    Status post stent to LAD 11/13/2015  Status post stent to mid LAD and SVG to marginal branch 09/16/2016.  Status post stent to mid LAD 5/24/2021  Status post stent to SVG to RCA 5/26/2021     Cardiac catheterization 5/24/2021 revealed  Left ventricle angiogram was not performed due to pre-existing renal dysfunction.  Left main coronary artery normal.  Left anterior descending artery has mid segment lengthy 90% disease within the previously placed stent.  Distal left into descending artery has diffuse disease.  Circumflex coronary artery is totally occluded.  (Chronic)  Right coronary artery is chronically occluded.  SVG to  marginal branch (jump graft to OM1 and OM 2) is totally occluded (new finding compared to 2015.  SVG to PDA has distal radiolucency.  However no definite obstructive disease is present.       -status post myocardial infarction 2000 and 2002 .      -history of intermittent atrial fibrillation-maintaining sinus rhythm     Transesophageal echocardiogram 11/30/2020.  Biatrial enlargement.  Smoking effect in the left atrium and left ventricle and left atrial appendage.  Mild mitral and aortic regurgitation.  Left ventricle enlargement with diffuse hypocontractility with ejection fraction of 35 to 40%.     Echocardiogram 11/27/2020 revealed left atrial enlargement left ventricle dysfunction with ejection fraction of 40%.  Negative bubble study.      -diabetes hypertension and sleep apnea in history of renal dysfunction .  Recent BUN/creatinine 38/1.36     -status post colon surgery (partial colectomy) appendectomy cholecystectomy right 4th toe removal and carpal tunnel surgery      -continued smoking 1 pack per day -abstinence from smoking      -allergy to codeine.  ============   Plan  ================  Status post CABG  Patient is not having any angina pectoris or congestive heart failure    Spontaneous prolonged episode of asystole is of concern.  Patient had recovered without any intervention or CPR.  We will closely observe for any bradycardia arrhythmias and patient may need pacemaker/ICD implantation.  No further episodes.     Status post stent to LAD 5/24/2021.  Status post stent to SVG to RCA 5/26/2021.     Paroxysmal atrial fibrillation.  Patient is mostly in sinus rhythm.     Renal dysfunction stable  Latest BUN/creatinine 43/1.6-6/2/2021     Anemia  Latest hemoglobin  8.4-5/28/2021     Anticoagulation  Patient is on Eliquis.     Medications were reviewed and updated.     Medications from nursing home were reviewed  They include albuterol inhaler Lasix 40 mg a day and atenolol 100 mg a day Plavix 75 mg a day  aspirin 81 mg a day digoxin 0.125 mg a day vitamin B12 Metformin gabapentin Imdur 60 mg a day hydralazine 50 mg twice a day Humulin insulin atorvastatin 40 mg a day Eliquis 5 mg twice a day    Follow-up in the office in 6 months.    Further plan will depend on patient's progress.   ]]]]]]]]]]]]]]]]]]]]]]              Diagnosis Plan   1. Hx of CABG     2. Dyslipidemia     3. Essential hypertension     4. Diabetes mellitus due to underlying condition without complication, without long-term current use of insulin (CMS/[facility])     LAB RESULTS (LAST 7 DAYS)    CBC        BMP        CMP         BNP        TROPONIN        CoAg        Creatinine Clearance  Estimated Creatinine Clearance: 53.8 mL/min (A) (by C-G formula based on SCr of 1.6 mg/dL (H)).    ABG        Radiology  No radiology results for the last day                The following portions of the patient's history were reviewed and updated as appropriate: allergies, current medications, past family history, past medical history, past social history, past surgical history and problem list.    Review of Systems   Constitutional: Negative for malaise/fatigue.   Cardiovascular: Positive for leg swelling. Negative for chest pain, palpitations and syncope.   Respiratory: Positive for shortness of breath.    Skin: Negative for rash.   Gastrointestinal: Negative for nausea and vomiting.   Neurological: Negative for dizziness, light-headedness and numbness.         Current Outpatient Medications:   •  albuterol sulfate  (90 Base) MCG/ACT inhaler, INHALE 2 PUFFS BY MOUTH EVERY 6 HOURS AS NEEDED FOR WHEEZING OR SHORTNESS OF BREATH, Disp: , Rfl:   •  apixaban (ELIQUIS) 5 MG tablet tablet, Take 1 tablet by mouth Every 12 (Twelve) Hours. Indications: Atrial Fibrillation, Disp: 60 tablet, Rfl: 0  •  aspirin 81 MG chewable tablet, Chew 81 mg Daily., Disp: , Rfl:   •  atenolol (TENORMIN) 100 MG tablet, Take 100 mg by mouth Daily., Disp: , Rfl:   •  atorvastatin (LIPITOR) 40 MG  tablet, Take 40 mg by mouth Every Night., Disp: , Rfl:   •  cholecalciferol (VITAMIN D3) 25 MCG (1000 UT) tablet, Take 1,000 Units by mouth Daily., Disp: , Rfl:   •  clopidogrel (PLAVIX) 75 MG tablet, Take 1 tablet by mouth Daily., Disp: 30 tablet, Rfl: 0  •  digoxin (LANOXIN) 125 MCG tablet, Take 125 mcg by mouth Daily. In the afternoon, Disp: , Rfl:   •  donepezil (ARICEPT) 10 MG tablet, Take 10 mg by mouth Every Night., Disp: , Rfl:   •  DULoxetine (CYMBALTA) 60 MG capsule, Take 60 mg by mouth Daily., Disp: , Rfl:   •  furosemide (LASIX) 40 MG tablet, Take 1 tablet by mouth Daily for 30 days., Disp: 30 tablet, Rfl: 0  •  gabapentin (NEURONTIN) 100 MG capsule, Take 200 mg by mouth 2 (two) times a day., Disp: , Rfl:   •  Glucagon, rDNA, (Glucagon Emergency) 1 MG kit, Inject 1 mg as directed 1 (One) Time As Needed (hypoglycemia)., Disp: , Rfl:   •  hydrALAZINE (APRESOLINE) 50 MG tablet, Take 50 mg by mouth 2 (two) times a day. Hold for SBP <110, Disp: , Rfl:   •  HYDROcodone-acetaminophen (NORCO) 7.5-325 MG per tablet, Take 1 tablet by mouth Every 12 (Twelve) Hours As Needed for Severe Pain ., Disp: 10 tablet, Rfl: 0  •  insulin NPH-insulin regular (humuLIN 70/30,novoLIN 70/30) (70-30) 100 UNIT/ML injection, Inject 30 Units under the skin into the appropriate area as directed 2 (Two) Times a Day With Meals., Disp: , Rfl:   •  isosorbide mononitrate (IMDUR) 60 MG 24 hr tablet, Take 60 mg by mouth 2 (Two) Times a Day., Disp: , Rfl:   •  metFORMIN ER (GLUCOPHAGE-XR) 500 MG 24 hr tablet, Take 500 mg by mouth 2 (two) times a day., Disp: , Rfl:   •  Omega-3 Fatty Acids (fish oil) 1000 MG capsule capsule, Take 1,000 mg by mouth Daily., Disp: , Rfl:   •  ondansetron (ZOFRAN) 4 MG tablet, Take 4 mg by mouth Every 8 (Eight) Hours As Needed for Nausea or Vomiting., Disp: , Rfl:   •  tiotropium (Spiriva HandiHaler) 18 MCG per inhalation capsule, Place 1 capsule into inhaler and inhale Daily., Disp: 30 capsule, Rfl: 1  •   vitamin B-12 (CYANOCOBALAMIN) 1000 MCG tablet, Take 1,000 mcg by mouth Daily., Disp: , Rfl:     Allergies   Allergen Reactions   • Codeine Itching       Family History   Problem Relation Age of Onset   • Heart disease Mother    • Heart disease Father    • Diabetes Sister    • Heart disease Sister    • Diabetes Brother    • Mental illness Brother        Past Surgical History:   Procedure Laterality Date   • ANGIOPLASTY      X2   • APPENDECTOMY     • CARDIAC CATHETERIZATION  11/13/2015   • CARDIAC CATHETERIZATION N/A 5/24/2021    Procedure: Left Heart Cath and coronary angiogram;  Surgeon: Jose G Rm MD;  Location:  [facility];  Service: Cardiovascular;  Laterality: N/A;   • CARDIAC CATHETERIZATION  5/24/2021    Procedure: Saphenous Vein Graft;  Surgeon: Jose G Rm MD;  Location:  [facility];  Service: Cardiovascular;;   • CARDIAC CATHETERIZATION N/A 5/24/2021    Procedure: Percutaneous Coronary Intervention;  Surgeon: Bernabe Zambrano MD;  Location:  [facility];  Service: Cardiology;  Laterality: N/A;   • CARDIAC CATHETERIZATION N/A 5/24/2021    Procedure: Stent NIELS coronary;  Surgeon: Bernabe Zambrano MD;  Location:  [facility];  Service: Cardiology;  Laterality: N/A;   • CARDIAC CATHETERIZATION N/A 5/26/2021    Procedure: Percutaneous Coronary Intervention;  Surgeon: Bernabe Zambrano MD;  Location:  [facility];  Service: Cardiovascular;  Laterality: N/A;   • CARDIAC CATHETERIZATION N/A 5/26/2021    Procedure: Stent NIELS bypass graft;  Surgeon: Bernabe Zambrano MD;  Location:  [facility] IN[facility];  Service: Cardiovascular;  Laterality: N/A;   • CARDIAC ELECTROPHYSIOLOGY PROCEDURE N/A 5/24/2021    Procedure: Temporary Pacemaker;  Surgeon: Bernabe Zambrano MD;  Location:  [facility] IN[facility];  Service: Cardiology;  Laterality: N/A;   • CARDIAC ELECTROPHYSIOLOGY PROCEDURE N/A 5/26/2021    Procedure: Temporary  Pacemaker;  Surgeon: Bernabe Zambrano MD;  Location: [facility] INVAS[facility];  Service: Cardiovascular;  Laterality: N/A;   • CARPAL TUNNEL RELEASE Left 04/29/2018    carpal tunnel- lt hand// other hand surgeries    • CATARACT EXTRACTION, BILATERAL  2002    Dr. Lux Acosta   • CHOLECYSTECTOMY     • COLON RESECTION  2005   • CORONARY ANGIOPLASTY     • CORONARY ANGIOPLASTY WITH STENT PLACEMENT  11/13/2015    PTCA stent to proximal in stent and mid to distal lad   • CORONARY ANGIOPLASTY WITH STENT PLACEMENT  09/16/2016    PTCA stent to mid lad and stent to vein graft to marginal   • CORONARY ARTERY BYPASS GRAFT  2005    @ [facility]   • CYST REMOVAL      cyst removed from scrotum   • FOOT SURGERY Right 07/17/2018   • FOOT SURGERY Left 02/04/2019   • TOE AMPUTATION Right     right toe removed D/T infected cut that went to the bone       Past Medical History:   Diagnosis Date   • Appetite loss    • Brain bleed (CMS/HCC)    • Carpal tunnel syndrome on left     carpal tunnel on left hand   • Diabetic neuropathy (CMS/HCC)    • DM2 (diabetes mellitus, type 2) (CMS/HCC)    • History of angina    • Hyperlipidemia    • Hypogonadism in male    • Obesity    • Sleep apnea    • Stroke (CMS/HCC)    • Unsteady gait        Family History   Problem Relation Age of Onset   • Heart disease Mother    • Heart disease Father    • Diabetes Sister    • Heart disease Sister    • Diabetes Brother    • Mental illness Brother        Social History     Socioeconomic History   • Marital status:      Spouse name: Not on file   • Number of children: Not on file   • Years of education: Not on file   • Highest education level: Not on file   Tobacco Use   • Smoking status: Former Smoker     Packs/day: 1.00     Types: Cigarettes   • Smokeless tobacco: Never Used   Vaping Use   • Vaping Use: Never used   Substance and Sexual Activity   • Alcohol use: No   • Drug use: Yes     Frequency: 1.0 times per week     Types: Marijuana     Comment: socially   •  "Sexual activity: Defer         Procedures      Objective:       Physical Exam    /75   Pulse 69   Ht 172.7 cm (68\")   SpO2 97%   BMI 39.96 kg/m²   The patient is alert, oriented and in no distress.    Vital signs as noted above.  Exogenous obesity.    Head and neck revealed no carotid bruits or jugular venous distension.  No thyromegaly or lymphadenopathy is present.    Lungs clear.  No wheezing.  Breath sounds are normal bilaterally.    Heart normal first and second heart sounds.  No murmur..  No pericardial rub is present.  No gallop is present.    Abdomen soft and nontender.  No organomegaly is present.    Extremities revealed good peripheral pulses without any pedal edema.    Skin warm and dry.    Musculoskeletal system is grossly normal.    CNS grossly normal.    Reviewed and unchanged from last visit.        "

## 2021-06-25 ENCOUNTER — HOSPITAL ENCOUNTER (INPATIENT)
Facility: HOSPITAL | Age: 71
LOS: 7 days | Discharge: NURSING, REHAB OR LONG-TERM CARE | End: 2021-07-02
Attending: EMERGENCY MEDICINE | Admitting: INTERNAL MEDICINE

## 2021-06-25 ENCOUNTER — APPOINTMENT (OUTPATIENT)
Dept: GENERAL RADIOLOGY | Facility: HOSPITAL | Age: 71
End: 2021-06-25

## 2021-06-25 DIAGNOSIS — I50.9 ACUTE ON CHRONIC CONGESTIVE HEART FAILURE, UNSPECIFIED HEART FAILURE TYPE (HCC): ICD-10-CM

## 2021-06-25 DIAGNOSIS — R06.03 ACUTE RESPIRATORY DISTRESS: Primary | ICD-10-CM

## 2021-06-25 DIAGNOSIS — M19.90 ARTHRITIS: ICD-10-CM

## 2021-06-25 LAB
ALBUMIN SERPL-MCNC: 3.5 G/DL (ref 3.5–5.2)
ALBUMIN/GLOB SERPL: 1 G/DL
ALP SERPL-CCNC: 53 U/L (ref 39–117)
ALT SERPL W P-5'-P-CCNC: 5 U/L (ref 1–41)
ANION GAP SERPL CALCULATED.3IONS-SCNC: 11 MMOL/L (ref 5–15)
AST SERPL-CCNC: 8 U/L (ref 1–40)
BASOPHILS # BLD AUTO: 0.1 10*3/MM3 (ref 0–0.2)
BASOPHILS NFR BLD AUTO: 1.5 % (ref 0–1.5)
BILIRUB SERPL-MCNC: 0.5 MG/DL (ref 0–1.2)
BUN SERPL-MCNC: 38 MG/DL (ref 8–23)
BUN/CREAT SERPL: 20.5 (ref 7–25)
CALCIUM SPEC-SCNC: 8.9 MG/DL (ref 8.6–10.5)
CHLORIDE SERPL-SCNC: 100 MMOL/L (ref 98–107)
CO2 SERPL-SCNC: 30 MMOL/L (ref 22–29)
CREAT SERPL-MCNC: 1.85 MG/DL (ref 0.76–1.27)
DEPRECATED RDW RBC AUTO: 45.9 FL (ref 37–54)
DIGOXIN SERPL-MCNC: 0.7 NG/ML (ref 0.6–1.2)
EOSINOPHIL # BLD AUTO: 0.5 10*3/MM3 (ref 0–0.4)
EOSINOPHIL NFR BLD AUTO: 7.3 % (ref 0.3–6.2)
ERYTHROCYTE [DISTWIDTH] IN BLOOD BY AUTOMATED COUNT: 14.7 % (ref 12.3–15.4)
GFR SERPL CREATININE-BSD FRML MDRD: 36 ML/MIN/1.73
GLOBULIN UR ELPH-MCNC: 3.4 GM/DL
GLUCOSE SERPL-MCNC: 138 MG/DL (ref 65–99)
HCT VFR BLD AUTO: 26.6 % (ref 37.5–51)
HGB BLD-MCNC: 8.8 G/DL (ref 13–17.7)
LYMPHOCYTES # BLD AUTO: 1.7 10*3/MM3 (ref 0.7–3.1)
LYMPHOCYTES NFR BLD AUTO: 25.4 % (ref 19.6–45.3)
MAGNESIUM SERPL-MCNC: 1.9 MG/DL (ref 1.6–2.4)
MCH RBC QN AUTO: 29 PG (ref 26.6–33)
MCHC RBC AUTO-ENTMCNC: 33 G/DL (ref 31.5–35.7)
MCV RBC AUTO: 87.9 FL (ref 79–97)
MONOCYTES # BLD AUTO: 0.7 10*3/MM3 (ref 0.1–0.9)
MONOCYTES NFR BLD AUTO: 10.8 % (ref 5–12)
NEUTROPHILS NFR BLD AUTO: 3.7 10*3/MM3 (ref 1.7–7)
NEUTROPHILS NFR BLD AUTO: 55 % (ref 42.7–76)
NRBC BLD AUTO-RTO: 0 /100 WBC (ref 0–0.2)
NT-PROBNP SERPL-MCNC: ABNORMAL PG/ML (ref 0–900)
PHOSPHATE SERPL-MCNC: 4.2 MG/DL (ref 2.5–4.5)
PLATELET # BLD AUTO: 311 10*3/MM3 (ref 140–450)
PMV BLD AUTO: 7.6 FL (ref 6–12)
POTASSIUM SERPL-SCNC: 4.6 MMOL/L (ref 3.5–5.2)
PROT SERPL-MCNC: 6.9 G/DL (ref 6–8.5)
RBC # BLD AUTO: 3.02 10*6/MM3 (ref 4.14–5.8)
SODIUM SERPL-SCNC: 141 MMOL/L (ref 136–145)
TROPONIN T SERPL-MCNC: 0.05 NG/ML (ref 0–0.03)
WBC # BLD AUTO: 6.7 10*3/MM3 (ref 3.4–10.8)

## 2021-06-25 PROCEDURE — 93005 ELECTROCARDIOGRAM TRACING: CPT | Performed by: EMERGENCY MEDICINE

## 2021-06-25 PROCEDURE — 71045 X-RAY EXAM CHEST 1 VIEW: CPT

## 2021-06-25 PROCEDURE — 99284 EMERGENCY DEPT VISIT MOD MDM: CPT

## 2021-06-25 PROCEDURE — 93005 ELECTROCARDIOGRAM TRACING: CPT

## 2021-06-25 PROCEDURE — 99222 1ST HOSP IP/OBS MODERATE 55: CPT | Performed by: NURSE PRACTITIONER

## 2021-06-25 PROCEDURE — 80162 ASSAY OF DIGOXIN TOTAL: CPT | Performed by: EMERGENCY MEDICINE

## 2021-06-25 PROCEDURE — 83735 ASSAY OF MAGNESIUM: CPT | Performed by: INTERNAL MEDICINE

## 2021-06-25 PROCEDURE — 84100 ASSAY OF PHOSPHORUS: CPT | Performed by: INTERNAL MEDICINE

## 2021-06-25 PROCEDURE — 25010000002 FUROSEMIDE PER 20 MG: Performed by: EMERGENCY MEDICINE

## 2021-06-25 PROCEDURE — 84484 ASSAY OF TROPONIN QUANT: CPT | Performed by: EMERGENCY MEDICINE

## 2021-06-25 PROCEDURE — 80053 COMPREHEN METABOLIC PANEL: CPT | Performed by: EMERGENCY MEDICINE

## 2021-06-25 PROCEDURE — G0378 HOSPITAL OBSERVATION PER HR: HCPCS

## 2021-06-25 PROCEDURE — 85025 COMPLETE CBC W/AUTO DIFF WBC: CPT | Performed by: EMERGENCY MEDICINE

## 2021-06-25 PROCEDURE — 83880 ASSAY OF NATRIURETIC PEPTIDE: CPT | Performed by: EMERGENCY MEDICINE

## 2021-06-25 RX ORDER — ACETAMINOPHEN 650 MG/1
650 SUPPOSITORY RECTAL EVERY 4 HOURS PRN
Status: DISCONTINUED | OUTPATIENT
Start: 2021-06-25 | End: 2021-07-02 | Stop reason: HOSPADM

## 2021-06-25 RX ORDER — NITROGLYCERIN 0.4 MG/1
0.4 TABLET SUBLINGUAL
Status: DISCONTINUED | OUTPATIENT
Start: 2021-06-25 | End: 2021-07-02 | Stop reason: HOSPADM

## 2021-06-25 RX ORDER — INSULIN LISPRO 100 [IU]/ML
0-14 INJECTION, SOLUTION INTRAVENOUS; SUBCUTANEOUS AS NEEDED
Status: DISCONTINUED | OUTPATIENT
Start: 2021-06-25 | End: 2021-07-02 | Stop reason: HOSPADM

## 2021-06-25 RX ORDER — ONDANSETRON 2 MG/ML
4 INJECTION INTRAMUSCULAR; INTRAVENOUS EVERY 6 HOURS PRN
Status: DISCONTINUED | OUTPATIENT
Start: 2021-06-25 | End: 2021-07-02 | Stop reason: HOSPADM

## 2021-06-25 RX ORDER — ONDANSETRON 4 MG/1
4 TABLET, FILM COATED ORAL EVERY 6 HOURS PRN
Status: DISCONTINUED | OUTPATIENT
Start: 2021-06-25 | End: 2021-07-02 | Stop reason: HOSPADM

## 2021-06-25 RX ORDER — CHOLECALCIFEROL (VITAMIN D3) 125 MCG
5 CAPSULE ORAL NIGHTLY PRN
Status: DISCONTINUED | OUTPATIENT
Start: 2021-06-25 | End: 2021-07-02 | Stop reason: HOSPADM

## 2021-06-25 RX ORDER — IPRATROPIUM BROMIDE AND ALBUTEROL SULFATE 2.5; .5 MG/3ML; MG/3ML
3 SOLUTION RESPIRATORY (INHALATION) EVERY 4 HOURS PRN
COMMUNITY
End: 2021-08-02 | Stop reason: HOSPADM

## 2021-06-25 RX ORDER — SODIUM CHLORIDE 0.9 % (FLUSH) 0.9 %
10 SYRINGE (ML) INJECTION AS NEEDED
Status: DISCONTINUED | OUTPATIENT
Start: 2021-06-25 | End: 2021-07-02 | Stop reason: HOSPADM

## 2021-06-25 RX ORDER — DEXTROSE MONOHYDRATE 25 G/50ML
25 INJECTION, SOLUTION INTRAVENOUS
Status: DISCONTINUED | OUTPATIENT
Start: 2021-06-25 | End: 2021-07-02 | Stop reason: HOSPADM

## 2021-06-25 RX ORDER — ACETAMINOPHEN 325 MG/1
650 TABLET ORAL EVERY 4 HOURS PRN
Status: DISCONTINUED | OUTPATIENT
Start: 2021-06-25 | End: 2021-07-02 | Stop reason: HOSPADM

## 2021-06-25 RX ORDER — NICOTINE POLACRILEX 4 MG
LOZENGE BUCCAL
COMMUNITY
End: 2021-08-30

## 2021-06-25 RX ORDER — SODIUM CHLORIDE 0.9 % (FLUSH) 0.9 %
10 SYRINGE (ML) INJECTION EVERY 12 HOURS SCHEDULED
Status: DISCONTINUED | OUTPATIENT
Start: 2021-06-25 | End: 2021-07-02 | Stop reason: HOSPADM

## 2021-06-25 RX ORDER — ACETAMINOPHEN 160 MG/5ML
650 SOLUTION ORAL EVERY 4 HOURS PRN
Status: DISCONTINUED | OUTPATIENT
Start: 2021-06-25 | End: 2021-07-02 | Stop reason: HOSPADM

## 2021-06-25 RX ORDER — ALUMINA, MAGNESIA, AND SIMETHICONE 2400; 2400; 240 MG/30ML; MG/30ML; MG/30ML
15 SUSPENSION ORAL EVERY 6 HOURS PRN
Status: DISCONTINUED | OUTPATIENT
Start: 2021-06-25 | End: 2021-07-02 | Stop reason: HOSPADM

## 2021-06-25 RX ORDER — NICOTINE POLACRILEX 4 MG
15 LOZENGE BUCCAL
Status: DISCONTINUED | OUTPATIENT
Start: 2021-06-25 | End: 2021-07-02 | Stop reason: HOSPADM

## 2021-06-25 RX ORDER — INSULIN LISPRO 100 [IU]/ML
0-14 INJECTION, SOLUTION INTRAVENOUS; SUBCUTANEOUS
Status: DISCONTINUED | OUTPATIENT
Start: 2021-06-26 | End: 2021-07-02 | Stop reason: HOSPADM

## 2021-06-25 RX ORDER — FUROSEMIDE 10 MG/ML
40 INJECTION INTRAMUSCULAR; INTRAVENOUS ONCE
Status: COMPLETED | OUTPATIENT
Start: 2021-06-25 | End: 2021-06-25

## 2021-06-25 RX ORDER — FUROSEMIDE 10 MG/ML
40 INJECTION INTRAMUSCULAR; INTRAVENOUS EVERY 8 HOURS
Status: DISCONTINUED | OUTPATIENT
Start: 2021-06-25 | End: 2021-07-02 | Stop reason: HOSPADM

## 2021-06-25 RX ADMIN — FUROSEMIDE 40 MG: 10 INJECTION, SOLUTION INTRAMUSCULAR; INTRAVENOUS at 21:55

## 2021-06-26 PROBLEM — N18.30 CKD (CHRONIC KIDNEY DISEASE) STAGE 3, GFR 30-59 ML/MIN (HCC): Chronic | Status: ACTIVE | Noted: 2021-06-26

## 2021-06-26 LAB
CA-I SERPL ISE-MCNC: 1.24 MMOL/L (ref 1.2–1.3)
GLUCOSE BLDC GLUCOMTR-MCNC: 109 MG/DL (ref 70–105)
GLUCOSE BLDC GLUCOMTR-MCNC: 184 MG/DL (ref 70–105)
GLUCOSE BLDC GLUCOMTR-MCNC: 189 MG/DL (ref 70–105)
GLUCOSE BLDC GLUCOMTR-MCNC: 219 MG/DL (ref 70–105)
GLUCOSE BLDC GLUCOMTR-MCNC: 232 MG/DL (ref 70–105)
QT INTERVAL: 356 MS
QT INTERVAL: 401 MS
TROPONIN T SERPL-MCNC: 0.06 NG/ML (ref 0–0.03)

## 2021-06-26 PROCEDURE — 93010 ELECTROCARDIOGRAM REPORT: CPT | Performed by: INTERNAL MEDICINE

## 2021-06-26 PROCEDURE — 63710000001 INSULIN ISOPHANE & REGULAR PER 5 UNITS: Performed by: NURSE PRACTITIONER

## 2021-06-26 PROCEDURE — 25010000002 FUROSEMIDE PER 20 MG: Performed by: INTERNAL MEDICINE

## 2021-06-26 PROCEDURE — 99222 1ST HOSP IP/OBS MODERATE 55: CPT | Performed by: INTERNAL MEDICINE

## 2021-06-26 PROCEDURE — 99225 PR SBSQ OBSERVATION CARE/DAY 25 MINUTES: CPT | Performed by: INTERNAL MEDICINE

## 2021-06-26 PROCEDURE — 63710000001 INSULIN LISPRO (HUMAN) PER 5 UNITS: Performed by: INTERNAL MEDICINE

## 2021-06-26 PROCEDURE — 82330 ASSAY OF CALCIUM: CPT | Performed by: INTERNAL MEDICINE

## 2021-06-26 PROCEDURE — 36415 COLL VENOUS BLD VENIPUNCTURE: CPT | Performed by: INTERNAL MEDICINE

## 2021-06-26 PROCEDURE — 84484 ASSAY OF TROPONIN QUANT: CPT | Performed by: INTERNAL MEDICINE

## 2021-06-26 PROCEDURE — 82962 GLUCOSE BLOOD TEST: CPT

## 2021-06-26 PROCEDURE — 93005 ELECTROCARDIOGRAM TRACING: CPT | Performed by: INTERNAL MEDICINE

## 2021-06-26 PROCEDURE — G0378 HOSPITAL OBSERVATION PER HR: HCPCS

## 2021-06-26 RX ORDER — CLOPIDOGREL BISULFATE 75 MG/1
75 TABLET ORAL DAILY
Status: DISCONTINUED | OUTPATIENT
Start: 2021-06-26 | End: 2021-07-02 | Stop reason: HOSPADM

## 2021-06-26 RX ORDER — DONEPEZIL HYDROCHLORIDE 5 MG/1
10 TABLET, FILM COATED ORAL NIGHTLY
Status: DISCONTINUED | OUTPATIENT
Start: 2021-06-26 | End: 2021-07-02 | Stop reason: HOSPADM

## 2021-06-26 RX ORDER — ISOSORBIDE MONONITRATE 60 MG/1
60 TABLET, EXTENDED RELEASE ORAL DAILY
Status: DISCONTINUED | OUTPATIENT
Start: 2021-06-26 | End: 2021-07-02 | Stop reason: HOSPADM

## 2021-06-26 RX ORDER — IPRATROPIUM BROMIDE AND ALBUTEROL SULFATE 2.5; .5 MG/3ML; MG/3ML
3 SOLUTION RESPIRATORY (INHALATION) EVERY 4 HOURS PRN
Status: DISCONTINUED | OUTPATIENT
Start: 2021-06-26 | End: 2021-07-02 | Stop reason: HOSPADM

## 2021-06-26 RX ORDER — DULOXETIN HYDROCHLORIDE 30 MG/1
60 CAPSULE, DELAYED RELEASE ORAL DAILY
Status: DISCONTINUED | OUTPATIENT
Start: 2021-06-26 | End: 2021-07-02 | Stop reason: HOSPADM

## 2021-06-26 RX ORDER — ATENOLOL 50 MG/1
100 TABLET ORAL DAILY
Status: DISCONTINUED | OUTPATIENT
Start: 2021-06-26 | End: 2021-07-02 | Stop reason: HOSPADM

## 2021-06-26 RX ORDER — DIGOXIN 125 MCG
125 TABLET ORAL
Status: DISCONTINUED | OUTPATIENT
Start: 2021-06-26 | End: 2021-07-02 | Stop reason: HOSPADM

## 2021-06-26 RX ORDER — HYDRALAZINE HYDROCHLORIDE 25 MG/1
50 TABLET, FILM COATED ORAL EVERY 12 HOURS SCHEDULED
Status: DISCONTINUED | OUTPATIENT
Start: 2021-06-26 | End: 2021-07-02 | Stop reason: HOSPADM

## 2021-06-26 RX ORDER — ALBUTEROL SULFATE 2.5 MG/3ML
2.5 SOLUTION RESPIRATORY (INHALATION)
Status: DISCONTINUED | OUTPATIENT
Start: 2021-06-26 | End: 2021-07-02 | Stop reason: HOSPADM

## 2021-06-26 RX ORDER — HYDROCODONE BITARTRATE AND ACETAMINOPHEN 7.5; 325 MG/1; MG/1
1 TABLET ORAL EVERY 12 HOURS PRN
Status: DISCONTINUED | OUTPATIENT
Start: 2021-06-26 | End: 2021-07-02 | Stop reason: HOSPADM

## 2021-06-26 RX ORDER — ATORVASTATIN CALCIUM 40 MG/1
40 TABLET, FILM COATED ORAL NIGHTLY
Status: DISCONTINUED | OUTPATIENT
Start: 2021-06-26 | End: 2021-07-02 | Stop reason: HOSPADM

## 2021-06-26 RX ORDER — GABAPENTIN 100 MG/1
200 CAPSULE ORAL EVERY 12 HOURS SCHEDULED
Status: DISCONTINUED | OUTPATIENT
Start: 2021-06-26 | End: 2021-07-02 | Stop reason: HOSPADM

## 2021-06-26 RX ORDER — MELATONIN
1000 DAILY
Status: DISCONTINUED | OUTPATIENT
Start: 2021-06-26 | End: 2021-07-02 | Stop reason: HOSPADM

## 2021-06-26 RX ORDER — ASPIRIN 81 MG/1
81 TABLET, CHEWABLE ORAL DAILY
Status: DISCONTINUED | OUTPATIENT
Start: 2021-06-26 | End: 2021-07-02 | Stop reason: HOSPADM

## 2021-06-26 RX ADMIN — APIXABAN 5 MG: 5 TABLET, FILM COATED ORAL at 12:04

## 2021-06-26 RX ADMIN — INSULIN LISPRO 3 UNITS: 100 INJECTION, SOLUTION INTRAVENOUS; SUBCUTANEOUS at 09:25

## 2021-06-26 RX ADMIN — Medication 10 ML: at 20:42

## 2021-06-26 RX ADMIN — ATENOLOL 100 MG: 50 TABLET ORAL at 12:04

## 2021-06-26 RX ADMIN — APIXABAN 5 MG: 5 TABLET, FILM COATED ORAL at 20:42

## 2021-06-26 RX ADMIN — DULOXETINE 60 MG: 30 CAPSULE, DELAYED RELEASE ORAL at 12:03

## 2021-06-26 RX ADMIN — DONEPEZIL HYDROCHLORIDE 10 MG: 5 TABLET, FILM COATED ORAL at 20:42

## 2021-06-26 RX ADMIN — ASPIRIN 81 MG CHEWABLE TABLET 81 MG: 81 TABLET CHEWABLE at 12:03

## 2021-06-26 RX ADMIN — Medication 10 ML: at 09:26

## 2021-06-26 RX ADMIN — INSULIN LISPRO 8 UNITS: 100 INJECTION, SOLUTION INTRAVENOUS; SUBCUTANEOUS at 12:03

## 2021-06-26 RX ADMIN — ISOSORBIDE MONONITRATE 60 MG: 60 TABLET, EXTENDED RELEASE ORAL at 12:04

## 2021-06-26 RX ADMIN — GABAPENTIN 200 MG: 100 CAPSULE ORAL at 12:04

## 2021-06-26 RX ADMIN — Medication 1000 UNITS: at 12:04

## 2021-06-26 RX ADMIN — FUROSEMIDE 40 MG: 10 INJECTION, SOLUTION INTRAMUSCULAR; INTRAVENOUS at 20:42

## 2021-06-26 RX ADMIN — HYDRALAZINE HYDROCHLORIDE 50 MG: 25 TABLET, FILM COATED ORAL at 20:42

## 2021-06-26 RX ADMIN — DIGOXIN 125 MCG: 125 TABLET ORAL at 12:03

## 2021-06-26 RX ADMIN — FUROSEMIDE 40 MG: 10 INJECTION, SOLUTION INTRAMUSCULAR; INTRAVENOUS at 14:17

## 2021-06-26 RX ADMIN — CLOPIDOGREL BISULFATE 75 MG: 75 TABLET ORAL at 12:04

## 2021-06-26 RX ADMIN — INSULIN LISPRO 5 UNITS: 100 INJECTION, SOLUTION INTRAVENOUS; SUBCUTANEOUS at 17:50

## 2021-06-26 RX ADMIN — FUROSEMIDE 40 MG: 10 INJECTION, SOLUTION INTRAMUSCULAR; INTRAVENOUS at 05:59

## 2021-06-26 RX ADMIN — ATORVASTATIN CALCIUM 40 MG: 40 TABLET, FILM COATED ORAL at 20:42

## 2021-06-26 RX ADMIN — HYDRALAZINE HYDROCHLORIDE 50 MG: 25 TABLET, FILM COATED ORAL at 12:04

## 2021-06-26 RX ADMIN — GABAPENTIN 200 MG: 100 CAPSULE ORAL at 20:42

## 2021-06-26 RX ADMIN — INSULIN HUMAN 30 UNITS: 100 INJECTION, SUSPENSION SUBCUTANEOUS at 17:51

## 2021-06-27 LAB
ANION GAP SERPL CALCULATED.3IONS-SCNC: 9 MMOL/L (ref 5–15)
BASOPHILS # BLD AUTO: 0.1 10*3/MM3 (ref 0–0.2)
BASOPHILS NFR BLD AUTO: 1 % (ref 0–1.5)
BUN SERPL-MCNC: 39 MG/DL (ref 8–23)
BUN/CREAT SERPL: 20.4 (ref 7–25)
CALCIUM SPEC-SCNC: 8.8 MG/DL (ref 8.6–10.5)
CHLORIDE SERPL-SCNC: 99 MMOL/L (ref 98–107)
CO2 SERPL-SCNC: 34 MMOL/L (ref 22–29)
CREAT SERPL-MCNC: 1.91 MG/DL (ref 0.76–1.27)
DEPRECATED RDW RBC AUTO: 45.1 FL (ref 37–54)
EOSINOPHIL # BLD AUTO: 0.5 10*3/MM3 (ref 0–0.4)
EOSINOPHIL NFR BLD AUTO: 6.2 % (ref 0.3–6.2)
ERYTHROCYTE [DISTWIDTH] IN BLOOD BY AUTOMATED COUNT: 14.4 % (ref 12.3–15.4)
GFR SERPL CREATININE-BSD FRML MDRD: 35 ML/MIN/1.73
GLUCOSE BLDC GLUCOMTR-MCNC: 175 MG/DL (ref 70–105)
GLUCOSE BLDC GLUCOMTR-MCNC: 202 MG/DL (ref 70–105)
GLUCOSE BLDC GLUCOMTR-MCNC: 87 MG/DL (ref 70–105)
GLUCOSE BLDC GLUCOMTR-MCNC: 96 MG/DL (ref 70–105)
GLUCOSE SERPL-MCNC: 181 MG/DL (ref 65–99)
HCT VFR BLD AUTO: 27.3 % (ref 37.5–51)
HGB BLD-MCNC: 8.9 G/DL (ref 13–17.7)
LYMPHOCYTES # BLD AUTO: 1.8 10*3/MM3 (ref 0.7–3.1)
LYMPHOCYTES NFR BLD AUTO: 23.3 % (ref 19.6–45.3)
MCH RBC QN AUTO: 28.6 PG (ref 26.6–33)
MCHC RBC AUTO-ENTMCNC: 32.5 G/DL (ref 31.5–35.7)
MCV RBC AUTO: 87.9 FL (ref 79–97)
MONOCYTES # BLD AUTO: 0.8 10*3/MM3 (ref 0.1–0.9)
MONOCYTES NFR BLD AUTO: 10.4 % (ref 5–12)
NEUTROPHILS NFR BLD AUTO: 4.6 10*3/MM3 (ref 1.7–7)
NEUTROPHILS NFR BLD AUTO: 59.1 % (ref 42.7–76)
NRBC BLD AUTO-RTO: 0.1 /100 WBC (ref 0–0.2)
PLATELET # BLD AUTO: 313 10*3/MM3 (ref 140–450)
PMV BLD AUTO: 7.6 FL (ref 6–12)
POTASSIUM SERPL-SCNC: 3.9 MMOL/L (ref 3.5–5.2)
RBC # BLD AUTO: 3.1 10*6/MM3 (ref 4.14–5.8)
SODIUM SERPL-SCNC: 142 MMOL/L (ref 136–145)
TROPONIN T SERPL-MCNC: 0.05 NG/ML (ref 0–0.03)
WBC # BLD AUTO: 7.7 10*3/MM3 (ref 3.4–10.8)

## 2021-06-27 PROCEDURE — 84484 ASSAY OF TROPONIN QUANT: CPT | Performed by: INTERNAL MEDICINE

## 2021-06-27 PROCEDURE — 99233 SBSQ HOSP IP/OBS HIGH 50: CPT | Performed by: INTERNAL MEDICINE

## 2021-06-27 PROCEDURE — 80048 BASIC METABOLIC PNL TOTAL CA: CPT | Performed by: INTERNAL MEDICINE

## 2021-06-27 PROCEDURE — 94799 UNLISTED PULMONARY SVC/PX: CPT

## 2021-06-27 PROCEDURE — 99232 SBSQ HOSP IP/OBS MODERATE 35: CPT | Performed by: INTERNAL MEDICINE

## 2021-06-27 PROCEDURE — 82962 GLUCOSE BLOOD TEST: CPT

## 2021-06-27 PROCEDURE — 25010000002 FUROSEMIDE PER 20 MG: Performed by: INTERNAL MEDICINE

## 2021-06-27 PROCEDURE — G0378 HOSPITAL OBSERVATION PER HR: HCPCS

## 2021-06-27 PROCEDURE — 85025 COMPLETE CBC W/AUTO DIFF WBC: CPT | Performed by: INTERNAL MEDICINE

## 2021-06-27 PROCEDURE — 63710000001 INSULIN ISOPHANE & REGULAR PER 5 UNITS: Performed by: NURSE PRACTITIONER

## 2021-06-27 PROCEDURE — 94640 AIRWAY INHALATION TREATMENT: CPT

## 2021-06-27 PROCEDURE — 63710000001 INSULIN LISPRO (HUMAN) PER 5 UNITS: Performed by: INTERNAL MEDICINE

## 2021-06-27 RX ADMIN — CLOPIDOGREL BISULFATE 75 MG: 75 TABLET ORAL at 09:43

## 2021-06-27 RX ADMIN — Medication 10 ML: at 21:58

## 2021-06-27 RX ADMIN — DULOXETINE 60 MG: 30 CAPSULE, DELAYED RELEASE ORAL at 09:43

## 2021-06-27 RX ADMIN — ATORVASTATIN CALCIUM 40 MG: 40 TABLET, FILM COATED ORAL at 21:57

## 2021-06-27 RX ADMIN — APIXABAN 5 MG: 5 TABLET, FILM COATED ORAL at 09:43

## 2021-06-27 RX ADMIN — ALBUTEROL SULFATE 2.5 MG: 2.5 SOLUTION RESPIRATORY (INHALATION) at 19:00

## 2021-06-27 RX ADMIN — Medication 10 ML: at 12:06

## 2021-06-27 RX ADMIN — HYDRALAZINE HYDROCHLORIDE 50 MG: 25 TABLET, FILM COATED ORAL at 21:57

## 2021-06-27 RX ADMIN — INSULIN LISPRO 3 UNITS: 100 INJECTION, SOLUTION INTRAVENOUS; SUBCUTANEOUS at 17:59

## 2021-06-27 RX ADMIN — DONEPEZIL HYDROCHLORIDE 10 MG: 5 TABLET, FILM COATED ORAL at 21:57

## 2021-06-27 RX ADMIN — ISOSORBIDE MONONITRATE 60 MG: 60 TABLET, EXTENDED RELEASE ORAL at 09:43

## 2021-06-27 RX ADMIN — GABAPENTIN 200 MG: 100 CAPSULE ORAL at 21:58

## 2021-06-27 RX ADMIN — INSULIN HUMAN 30 UNITS: 100 INJECTION, SUSPENSION SUBCUTANEOUS at 09:42

## 2021-06-27 RX ADMIN — ASPIRIN 81 MG CHEWABLE TABLET 81 MG: 81 TABLET CHEWABLE at 09:42

## 2021-06-27 RX ADMIN — DIGOXIN 125 MCG: 125 TABLET ORAL at 12:06

## 2021-06-27 RX ADMIN — Medication 1000 UNITS: at 09:43

## 2021-06-27 RX ADMIN — FUROSEMIDE 40 MG: 10 INJECTION, SOLUTION INTRAMUSCULAR; INTRAVENOUS at 05:28

## 2021-06-27 RX ADMIN — ALBUTEROL SULFATE 2.5 MG: 2.5 SOLUTION RESPIRATORY (INHALATION) at 07:10

## 2021-06-27 RX ADMIN — APIXABAN 5 MG: 5 TABLET, FILM COATED ORAL at 21:57

## 2021-06-27 RX ADMIN — INSULIN HUMAN 30 UNITS: 100 INJECTION, SUSPENSION SUBCUTANEOUS at 17:59

## 2021-06-27 RX ADMIN — FUROSEMIDE 40 MG: 10 INJECTION, SOLUTION INTRAMUSCULAR; INTRAVENOUS at 15:01

## 2021-06-27 RX ADMIN — GABAPENTIN 200 MG: 100 CAPSULE ORAL at 09:42

## 2021-06-27 RX ADMIN — HYDRALAZINE HYDROCHLORIDE 50 MG: 25 TABLET, FILM COATED ORAL at 09:43

## 2021-06-27 RX ADMIN — ALBUTEROL SULFATE 2.5 MG: 2.5 SOLUTION RESPIRATORY (INHALATION) at 11:03

## 2021-06-27 RX ADMIN — ATENOLOL 100 MG: 50 TABLET ORAL at 09:43

## 2021-06-27 RX ADMIN — FUROSEMIDE 40 MG: 10 INJECTION, SOLUTION INTRAMUSCULAR; INTRAVENOUS at 21:57

## 2021-06-27 RX ADMIN — INSULIN LISPRO 5 UNITS: 100 INJECTION, SOLUTION INTRAVENOUS; SUBCUTANEOUS at 12:05

## 2021-06-28 ENCOUNTER — APPOINTMENT (OUTPATIENT)
Dept: GENERAL RADIOLOGY | Facility: HOSPITAL | Age: 71
End: 2021-06-28

## 2021-06-28 LAB
GLUCOSE BLDC GLUCOMTR-MCNC: 148 MG/DL (ref 70–105)
GLUCOSE BLDC GLUCOMTR-MCNC: 157 MG/DL (ref 70–105)
GLUCOSE BLDC GLUCOMTR-MCNC: 172 MG/DL (ref 70–105)
GLUCOSE BLDC GLUCOMTR-MCNC: 68 MG/DL (ref 70–105)
GLUCOSE BLDC GLUCOMTR-MCNC: 94 MG/DL (ref 70–105)
GLUCOSE BLDC GLUCOMTR-MCNC: 99 MG/DL (ref 70–105)

## 2021-06-28 PROCEDURE — 63710000001 INSULIN LISPRO (HUMAN) PER 5 UNITS: Performed by: INTERNAL MEDICINE

## 2021-06-28 PROCEDURE — 25010000002 FUROSEMIDE PER 20 MG: Performed by: INTERNAL MEDICINE

## 2021-06-28 PROCEDURE — 94799 UNLISTED PULMONARY SVC/PX: CPT

## 2021-06-28 PROCEDURE — 63710000001 INSULIN ISOPHANE & REGULAR PER 5 UNITS: Performed by: NURSE PRACTITIONER

## 2021-06-28 PROCEDURE — 99232 SBSQ HOSP IP/OBS MODERATE 35: CPT | Performed by: INTERNAL MEDICINE

## 2021-06-28 PROCEDURE — 82962 GLUCOSE BLOOD TEST: CPT

## 2021-06-28 PROCEDURE — 73564 X-RAY EXAM KNEE 4 OR MORE: CPT

## 2021-06-28 RX ADMIN — Medication 1000 UNITS: at 09:04

## 2021-06-28 RX ADMIN — ISOSORBIDE MONONITRATE 60 MG: 60 TABLET, EXTENDED RELEASE ORAL at 09:04

## 2021-06-28 RX ADMIN — Medication 10 ML: at 09:03

## 2021-06-28 RX ADMIN — ATORVASTATIN CALCIUM 40 MG: 40 TABLET, FILM COATED ORAL at 20:27

## 2021-06-28 RX ADMIN — ALBUTEROL SULFATE 2.5 MG: 2.5 SOLUTION RESPIRATORY (INHALATION) at 10:45

## 2021-06-28 RX ADMIN — ATENOLOL 100 MG: 50 TABLET ORAL at 09:04

## 2021-06-28 RX ADMIN — DONEPEZIL HYDROCHLORIDE 10 MG: 5 TABLET, FILM COATED ORAL at 20:27

## 2021-06-28 RX ADMIN — DIGOXIN 125 MCG: 125 TABLET ORAL at 11:42

## 2021-06-28 RX ADMIN — Medication 10 ML: at 20:27

## 2021-06-28 RX ADMIN — INSULIN HUMAN 30 UNITS: 100 INJECTION, SUSPENSION SUBCUTANEOUS at 17:05

## 2021-06-28 RX ADMIN — APIXABAN 5 MG: 5 TABLET, FILM COATED ORAL at 09:04

## 2021-06-28 RX ADMIN — INSULIN HUMAN 30 UNITS: 100 INJECTION, SUSPENSION SUBCUTANEOUS at 09:05

## 2021-06-28 RX ADMIN — CLOPIDOGREL BISULFATE 75 MG: 75 TABLET ORAL at 09:04

## 2021-06-28 RX ADMIN — APIXABAN 5 MG: 5 TABLET, FILM COATED ORAL at 20:27

## 2021-06-28 RX ADMIN — FUROSEMIDE 40 MG: 10 INJECTION, SOLUTION INTRAMUSCULAR; INTRAVENOUS at 21:04

## 2021-06-28 RX ADMIN — ALBUTEROL SULFATE 2.5 MG: 2.5 SOLUTION RESPIRATORY (INHALATION) at 07:43

## 2021-06-28 RX ADMIN — ALBUTEROL SULFATE 2.5 MG: 2.5 SOLUTION RESPIRATORY (INHALATION) at 20:06

## 2021-06-28 RX ADMIN — GABAPENTIN 200 MG: 100 CAPSULE ORAL at 20:27

## 2021-06-28 RX ADMIN — FUROSEMIDE 40 MG: 10 INJECTION, SOLUTION INTRAMUSCULAR; INTRAVENOUS at 13:03

## 2021-06-28 RX ADMIN — HYDRALAZINE HYDROCHLORIDE 50 MG: 25 TABLET, FILM COATED ORAL at 20:27

## 2021-06-28 RX ADMIN — ASPIRIN 81 MG CHEWABLE TABLET 81 MG: 81 TABLET CHEWABLE at 09:03

## 2021-06-28 RX ADMIN — GABAPENTIN 200 MG: 100 CAPSULE ORAL at 09:04

## 2021-06-28 RX ADMIN — FUROSEMIDE 40 MG: 10 INJECTION, SOLUTION INTRAMUSCULAR; INTRAVENOUS at 05:44

## 2021-06-28 RX ADMIN — DULOXETINE 60 MG: 30 CAPSULE, DELAYED RELEASE ORAL at 09:04

## 2021-06-28 RX ADMIN — INSULIN LISPRO 3 UNITS: 100 INJECTION, SOLUTION INTRAVENOUS; SUBCUTANEOUS at 09:04

## 2021-06-28 RX ADMIN — INSULIN LISPRO 3 UNITS: 100 INJECTION, SOLUTION INTRAVENOUS; SUBCUTANEOUS at 11:53

## 2021-06-28 RX ADMIN — HYDRALAZINE HYDROCHLORIDE 50 MG: 25 TABLET, FILM COATED ORAL at 09:04

## 2021-06-28 RX ADMIN — DEXTROSE 15 G: 15 GEL ORAL at 06:12

## 2021-06-29 LAB
ANION GAP SERPL CALCULATED.3IONS-SCNC: 8 MMOL/L (ref 5–15)
BUN SERPL-MCNC: 42 MG/DL (ref 8–23)
BUN/CREAT SERPL: 23.1 (ref 7–25)
CALCIUM SPEC-SCNC: 8.6 MG/DL (ref 8.6–10.5)
CHLORIDE SERPL-SCNC: 97 MMOL/L (ref 98–107)
CO2 SERPL-SCNC: 35 MMOL/L (ref 22–29)
CREAT SERPL-MCNC: 1.82 MG/DL (ref 0.76–1.27)
DEPRECATED RDW RBC AUTO: 44.2 FL (ref 37–54)
ERYTHROCYTE [DISTWIDTH] IN BLOOD BY AUTOMATED COUNT: 14.5 % (ref 12.3–15.4)
GFR SERPL CREATININE-BSD FRML MDRD: 37 ML/MIN/1.73
GLUCOSE BLDC GLUCOMTR-MCNC: 122 MG/DL (ref 70–105)
GLUCOSE BLDC GLUCOMTR-MCNC: 127 MG/DL (ref 70–105)
GLUCOSE BLDC GLUCOMTR-MCNC: 177 MG/DL (ref 70–105)
GLUCOSE BLDC GLUCOMTR-MCNC: 192 MG/DL (ref 70–105)
GLUCOSE SERPL-MCNC: 109 MG/DL (ref 65–99)
HCT VFR BLD AUTO: 27 % (ref 37.5–51)
HGB BLD-MCNC: 8.9 G/DL (ref 13–17.7)
MAGNESIUM SERPL-MCNC: 1.8 MG/DL (ref 1.6–2.4)
MCH RBC QN AUTO: 28.9 PG (ref 26.6–33)
MCHC RBC AUTO-ENTMCNC: 33.1 G/DL (ref 31.5–35.7)
MCV RBC AUTO: 87.5 FL (ref 79–97)
NT-PROBNP SERPL-MCNC: 8456 PG/ML (ref 0–900)
PLATELET # BLD AUTO: 314 10*3/MM3 (ref 140–450)
PMV BLD AUTO: 7.8 FL (ref 6–12)
POTASSIUM SERPL-SCNC: 3.6 MMOL/L (ref 3.5–5.2)
RBC # BLD AUTO: 3.09 10*6/MM3 (ref 4.14–5.8)
SODIUM SERPL-SCNC: 140 MMOL/L (ref 136–145)
WBC # BLD AUTO: 6.8 10*3/MM3 (ref 3.4–10.8)

## 2021-06-29 PROCEDURE — 99232 SBSQ HOSP IP/OBS MODERATE 35: CPT | Performed by: INTERNAL MEDICINE

## 2021-06-29 PROCEDURE — 99222 1ST HOSP IP/OBS MODERATE 55: CPT | Performed by: NURSE PRACTITIONER

## 2021-06-29 PROCEDURE — 25010000002 FUROSEMIDE PER 20 MG: Performed by: INTERNAL MEDICINE

## 2021-06-29 PROCEDURE — 63710000001 INSULIN LISPRO (HUMAN) PER 5 UNITS: Performed by: INTERNAL MEDICINE

## 2021-06-29 PROCEDURE — 94799 UNLISTED PULMONARY SVC/PX: CPT

## 2021-06-29 PROCEDURE — 85027 COMPLETE CBC AUTOMATED: CPT | Performed by: INTERNAL MEDICINE

## 2021-06-29 PROCEDURE — 83880 ASSAY OF NATRIURETIC PEPTIDE: CPT | Performed by: INTERNAL MEDICINE

## 2021-06-29 PROCEDURE — 63710000001 INSULIN ISOPHANE & REGULAR PER 5 UNITS: Performed by: NURSE PRACTITIONER

## 2021-06-29 PROCEDURE — 82962 GLUCOSE BLOOD TEST: CPT

## 2021-06-29 PROCEDURE — 80048 BASIC METABOLIC PNL TOTAL CA: CPT | Performed by: INTERNAL MEDICINE

## 2021-06-29 PROCEDURE — 83735 ASSAY OF MAGNESIUM: CPT | Performed by: INTERNAL MEDICINE

## 2021-06-29 RX ADMIN — DONEPEZIL HYDROCHLORIDE 10 MG: 5 TABLET, FILM COATED ORAL at 20:30

## 2021-06-29 RX ADMIN — ALBUTEROL SULFATE 2.5 MG: 2.5 SOLUTION RESPIRATORY (INHALATION) at 19:52

## 2021-06-29 RX ADMIN — INSULIN HUMAN 30 UNITS: 100 INJECTION, SUSPENSION SUBCUTANEOUS at 07:49

## 2021-06-29 RX ADMIN — GABAPENTIN 200 MG: 100 CAPSULE ORAL at 08:00

## 2021-06-29 RX ADMIN — INSULIN LISPRO 3 UNITS: 100 INJECTION, SOLUTION INTRAVENOUS; SUBCUTANEOUS at 17:11

## 2021-06-29 RX ADMIN — FUROSEMIDE 40 MG: 10 INJECTION, SOLUTION INTRAMUSCULAR; INTRAVENOUS at 13:01

## 2021-06-29 RX ADMIN — IPRATROPIUM BROMIDE AND ALBUTEROL SULFATE 3 ML: 2.5; .5 SOLUTION RESPIRATORY (INHALATION) at 11:12

## 2021-06-29 RX ADMIN — HYDRALAZINE HYDROCHLORIDE 50 MG: 25 TABLET, FILM COATED ORAL at 08:00

## 2021-06-29 RX ADMIN — ISOSORBIDE MONONITRATE 60 MG: 60 TABLET, EXTENDED RELEASE ORAL at 08:00

## 2021-06-29 RX ADMIN — APIXABAN 5 MG: 5 TABLET, FILM COATED ORAL at 20:30

## 2021-06-29 RX ADMIN — HYDRALAZINE HYDROCHLORIDE 50 MG: 25 TABLET, FILM COATED ORAL at 20:30

## 2021-06-29 RX ADMIN — DIGOXIN 125 MCG: 125 TABLET ORAL at 11:36

## 2021-06-29 RX ADMIN — INSULIN HUMAN 30 UNITS: 100 INJECTION, SUSPENSION SUBCUTANEOUS at 17:12

## 2021-06-29 RX ADMIN — DULOXETINE 60 MG: 30 CAPSULE, DELAYED RELEASE ORAL at 08:00

## 2021-06-29 RX ADMIN — ATORVASTATIN CALCIUM 40 MG: 40 TABLET, FILM COATED ORAL at 20:30

## 2021-06-29 RX ADMIN — FUROSEMIDE 40 MG: 10 INJECTION, SOLUTION INTRAMUSCULAR; INTRAVENOUS at 05:56

## 2021-06-29 RX ADMIN — ATENOLOL 100 MG: 50 TABLET ORAL at 08:00

## 2021-06-29 RX ADMIN — GABAPENTIN 200 MG: 100 CAPSULE ORAL at 20:30

## 2021-06-29 RX ADMIN — Medication 1000 UNITS: at 08:00

## 2021-06-29 RX ADMIN — FUROSEMIDE 40 MG: 10 INJECTION, SOLUTION INTRAMUSCULAR; INTRAVENOUS at 21:09

## 2021-06-29 RX ADMIN — INSULIN LISPRO 3 UNITS: 100 INJECTION, SOLUTION INTRAVENOUS; SUBCUTANEOUS at 11:36

## 2021-06-29 RX ADMIN — HYDROCODONE BITARTRATE AND ACETAMINOPHEN 1 TABLET: 7.5; 325 TABLET ORAL at 20:30

## 2021-06-29 RX ADMIN — ALBUTEROL SULFATE 2.5 MG: 2.5 SOLUTION RESPIRATORY (INHALATION) at 14:58

## 2021-06-29 RX ADMIN — CLOPIDOGREL BISULFATE 75 MG: 75 TABLET ORAL at 08:00

## 2021-06-29 RX ADMIN — Medication 10 ML: at 08:00

## 2021-06-29 RX ADMIN — ASPIRIN 81 MG CHEWABLE TABLET 81 MG: 81 TABLET CHEWABLE at 08:00

## 2021-06-29 RX ADMIN — Medication 10 ML: at 20:30

## 2021-06-29 RX ADMIN — APIXABAN 5 MG: 5 TABLET, FILM COATED ORAL at 08:00

## 2021-06-30 LAB
GLUCOSE BLDC GLUCOMTR-MCNC: 101 MG/DL (ref 70–105)
GLUCOSE BLDC GLUCOMTR-MCNC: 138 MG/DL (ref 70–105)
GLUCOSE BLDC GLUCOMTR-MCNC: 164 MG/DL (ref 70–105)
GLUCOSE BLDC GLUCOMTR-MCNC: 197 MG/DL (ref 70–105)

## 2021-06-30 PROCEDURE — 99232 SBSQ HOSP IP/OBS MODERATE 35: CPT | Performed by: INTERNAL MEDICINE

## 2021-06-30 PROCEDURE — 82962 GLUCOSE BLOOD TEST: CPT

## 2021-06-30 PROCEDURE — 63710000001 INSULIN LISPRO (HUMAN) PER 5 UNITS: Performed by: INTERNAL MEDICINE

## 2021-06-30 PROCEDURE — 97530 THERAPEUTIC ACTIVITIES: CPT

## 2021-06-30 PROCEDURE — 97162 PT EVAL MOD COMPLEX 30 MIN: CPT

## 2021-06-30 PROCEDURE — 97165 OT EVAL LOW COMPLEX 30 MIN: CPT

## 2021-06-30 PROCEDURE — 63710000001 INSULIN ISOPHANE & REGULAR PER 5 UNITS: Performed by: NURSE PRACTITIONER

## 2021-06-30 PROCEDURE — 94799 UNLISTED PULMONARY SVC/PX: CPT

## 2021-06-30 PROCEDURE — 25010000002 FUROSEMIDE PER 20 MG: Performed by: INTERNAL MEDICINE

## 2021-06-30 PROCEDURE — U0003 INFECTIOUS AGENT DETECTION BY NUCLEIC ACID (DNA OR RNA); SEVERE ACUTE RESPIRATORY SYNDROME CORONAVIRUS 2 (SARS-COV-2) (CORONAVIRUS DISEASE [COVID-19]), AMPLIFIED PROBE TECHNIQUE, MAKING USE OF HIGH THROUGHPUT TECHNOLOGIES AS DESCRIBED BY CMS-2020-01-R: HCPCS | Performed by: INTERNAL MEDICINE

## 2021-06-30 RX ADMIN — HYDRALAZINE HYDROCHLORIDE 50 MG: 25 TABLET, FILM COATED ORAL at 21:27

## 2021-06-30 RX ADMIN — INSULIN HUMAN 30 UNITS: 100 INJECTION, SUSPENSION SUBCUTANEOUS at 09:26

## 2021-06-30 RX ADMIN — FUROSEMIDE 40 MG: 10 INJECTION, SOLUTION INTRAMUSCULAR; INTRAVENOUS at 05:30

## 2021-06-30 RX ADMIN — ISOSORBIDE MONONITRATE 60 MG: 60 TABLET, EXTENDED RELEASE ORAL at 09:20

## 2021-06-30 RX ADMIN — Medication 10 ML: at 09:20

## 2021-06-30 RX ADMIN — DIGOXIN 125 MCG: 125 TABLET ORAL at 12:00

## 2021-06-30 RX ADMIN — INSULIN LISPRO 3 UNITS: 100 INJECTION, SOLUTION INTRAVENOUS; SUBCUTANEOUS at 16:44

## 2021-06-30 RX ADMIN — HYDRALAZINE HYDROCHLORIDE 50 MG: 25 TABLET, FILM COATED ORAL at 09:21

## 2021-06-30 RX ADMIN — APIXABAN 5 MG: 5 TABLET, FILM COATED ORAL at 09:20

## 2021-06-30 RX ADMIN — CLOPIDOGREL BISULFATE 75 MG: 75 TABLET ORAL at 09:20

## 2021-06-30 RX ADMIN — Medication 10 ML: at 21:27

## 2021-06-30 RX ADMIN — ATENOLOL 100 MG: 50 TABLET ORAL at 09:21

## 2021-06-30 RX ADMIN — INSULIN LISPRO 3 UNITS: 100 INJECTION, SOLUTION INTRAVENOUS; SUBCUTANEOUS at 11:59

## 2021-06-30 RX ADMIN — INSULIN HUMAN 30 UNITS: 100 INJECTION, SUSPENSION SUBCUTANEOUS at 17:00

## 2021-06-30 RX ADMIN — GABAPENTIN 200 MG: 100 CAPSULE ORAL at 21:27

## 2021-06-30 RX ADMIN — Medication 1000 UNITS: at 09:20

## 2021-06-30 RX ADMIN — ALBUTEROL SULFATE 2.5 MG: 2.5 SOLUTION RESPIRATORY (INHALATION) at 07:49

## 2021-06-30 RX ADMIN — ATORVASTATIN CALCIUM 40 MG: 40 TABLET, FILM COATED ORAL at 21:27

## 2021-06-30 RX ADMIN — DULOXETINE 60 MG: 30 CAPSULE, DELAYED RELEASE ORAL at 09:20

## 2021-06-30 RX ADMIN — FUROSEMIDE 40 MG: 10 INJECTION, SOLUTION INTRAMUSCULAR; INTRAVENOUS at 21:27

## 2021-06-30 RX ADMIN — DONEPEZIL HYDROCHLORIDE 10 MG: 5 TABLET, FILM COATED ORAL at 21:27

## 2021-06-30 RX ADMIN — APIXABAN 5 MG: 5 TABLET, FILM COATED ORAL at 21:27

## 2021-06-30 RX ADMIN — FUROSEMIDE 40 MG: 10 INJECTION, SOLUTION INTRAMUSCULAR; INTRAVENOUS at 13:02

## 2021-06-30 RX ADMIN — GABAPENTIN 200 MG: 100 CAPSULE ORAL at 09:20

## 2021-06-30 RX ADMIN — ASPIRIN 81 MG CHEWABLE TABLET 81 MG: 81 TABLET CHEWABLE at 09:20

## 2021-07-01 LAB
GLUCOSE BLDC GLUCOMTR-MCNC: 110 MG/DL (ref 70–105)
GLUCOSE BLDC GLUCOMTR-MCNC: 128 MG/DL (ref 70–105)
GLUCOSE BLDC GLUCOMTR-MCNC: 172 MG/DL (ref 70–105)
GLUCOSE BLDC GLUCOMTR-MCNC: 184 MG/DL (ref 70–105)
SARS-COV-2 RNA PNL SPEC NAA+PROBE: NOT DETECTED

## 2021-07-01 PROCEDURE — 63710000001 INSULIN ISOPHANE & REGULAR PER 5 UNITS: Performed by: NURSE PRACTITIONER

## 2021-07-01 PROCEDURE — 97530 THERAPEUTIC ACTIVITIES: CPT

## 2021-07-01 PROCEDURE — 82962 GLUCOSE BLOOD TEST: CPT

## 2021-07-01 PROCEDURE — 25010000002 FUROSEMIDE PER 20 MG: Performed by: INTERNAL MEDICINE

## 2021-07-01 PROCEDURE — 94799 UNLISTED PULMONARY SVC/PX: CPT

## 2021-07-01 PROCEDURE — 63710000001 INSULIN LISPRO (HUMAN) PER 5 UNITS: Performed by: INTERNAL MEDICINE

## 2021-07-01 PROCEDURE — 99232 SBSQ HOSP IP/OBS MODERATE 35: CPT | Performed by: INTERNAL MEDICINE

## 2021-07-01 PROCEDURE — 97110 THERAPEUTIC EXERCISES: CPT

## 2021-07-01 RX ADMIN — INSULIN LISPRO 3 UNITS: 100 INJECTION, SOLUTION INTRAVENOUS; SUBCUTANEOUS at 17:03

## 2021-07-01 RX ADMIN — CLOPIDOGREL BISULFATE 75 MG: 75 TABLET ORAL at 09:29

## 2021-07-01 RX ADMIN — FUROSEMIDE 40 MG: 10 INJECTION, SOLUTION INTRAMUSCULAR; INTRAVENOUS at 22:03

## 2021-07-01 RX ADMIN — APIXABAN 5 MG: 5 TABLET, FILM COATED ORAL at 20:14

## 2021-07-01 RX ADMIN — ALBUTEROL SULFATE 2.5 MG: 2.5 SOLUTION RESPIRATORY (INHALATION) at 15:30

## 2021-07-01 RX ADMIN — APIXABAN 5 MG: 5 TABLET, FILM COATED ORAL at 09:29

## 2021-07-01 RX ADMIN — GABAPENTIN 200 MG: 100 CAPSULE ORAL at 20:14

## 2021-07-01 RX ADMIN — HYDRALAZINE HYDROCHLORIDE 50 MG: 25 TABLET, FILM COATED ORAL at 09:28

## 2021-07-01 RX ADMIN — FUROSEMIDE 40 MG: 10 INJECTION, SOLUTION INTRAMUSCULAR; INTRAVENOUS at 14:27

## 2021-07-01 RX ADMIN — ISOSORBIDE MONONITRATE 60 MG: 60 TABLET, EXTENDED RELEASE ORAL at 09:29

## 2021-07-01 RX ADMIN — DULOXETINE 60 MG: 30 CAPSULE, DELAYED RELEASE ORAL at 09:29

## 2021-07-01 RX ADMIN — HYDRALAZINE HYDROCHLORIDE 50 MG: 25 TABLET, FILM COATED ORAL at 20:14

## 2021-07-01 RX ADMIN — ASPIRIN 81 MG CHEWABLE TABLET 81 MG: 81 TABLET CHEWABLE at 09:28

## 2021-07-01 RX ADMIN — FUROSEMIDE 40 MG: 10 INJECTION, SOLUTION INTRAMUSCULAR; INTRAVENOUS at 06:00

## 2021-07-01 RX ADMIN — ALBUTEROL SULFATE 2.5 MG: 2.5 SOLUTION RESPIRATORY (INHALATION) at 11:24

## 2021-07-01 RX ADMIN — INSULIN HUMAN 30 UNITS: 100 INJECTION, SUSPENSION SUBCUTANEOUS at 09:23

## 2021-07-01 RX ADMIN — ATENOLOL 100 MG: 50 TABLET ORAL at 09:28

## 2021-07-01 RX ADMIN — INSULIN LISPRO 3 UNITS: 100 INJECTION, SOLUTION INTRAVENOUS; SUBCUTANEOUS at 11:42

## 2021-07-01 RX ADMIN — Medication 1000 UNITS: at 09:30

## 2021-07-01 RX ADMIN — GABAPENTIN 200 MG: 100 CAPSULE ORAL at 09:28

## 2021-07-01 RX ADMIN — Medication 10 ML: at 09:28

## 2021-07-01 RX ADMIN — Medication 10 ML: at 20:18

## 2021-07-01 RX ADMIN — INSULIN HUMAN 30 UNITS: 100 INJECTION, SUSPENSION SUBCUTANEOUS at 17:06

## 2021-07-01 RX ADMIN — DIGOXIN 125 MCG: 125 TABLET ORAL at 11:42

## 2021-07-01 RX ADMIN — ATORVASTATIN CALCIUM 40 MG: 40 TABLET, FILM COATED ORAL at 20:14

## 2021-07-01 RX ADMIN — DONEPEZIL HYDROCHLORIDE 10 MG: 5 TABLET, FILM COATED ORAL at 20:14

## 2021-07-01 NOTE — PROGRESS NOTES
[facility] Medicine Services Daily Progress Note      Hospitalist Team  LOS 3 days      Patient Care Team:  Samantha Zabala APRN as PCP - General  Samantha Zabala APRN as PCP - Family Medicine  Jose G Rm MD as Consulting Physician (Cardiology)    Patient Location: 382/1      Subjective   Subjective     Chief Complaint / Subjective  Chief Complaint   Patient presents with   • Shortness of Breath     from [facility] [facility].  SOA since yesterday.         Brief Synopsis of Hospital Course/HPI  Mr. Mclean is a 70 y.o. male w/PMH of CHF, CKD, DM, HTN, HLD, CAD, COPD, A. fib, DM, ANNETTE, depression, immobility syndrome, CVA, neuropathy, dementia who presents to [facility] ED due to dyspnea.  Patient has dementia and is a poor historian, unable to complete review of systems at this time.        Upon arrival to the ED vitals temp 98.2, HR 62, RR 18, /77, O2 sats 97% on 3 L nasal cannula.  Labs notable for troponin 0.053, proBNP 10,623, glucose 138, , K4.6, CO2 30, BUN 38, creatinine 1.5, ALT 5, AST 8, mag 1.9, WBC 6.7, Hgb 8.8, platelets 311, neutrophils 55.  Chest x-ray shows hazy bilateral airspace opacities which could reflect pulmonary edema or pneumonia, small bilateral pleural effusions.  EKG showed sinus rhythm rate 72, atrial premature complexes, prolonged LA interval nonspecific repolarization abnormality in diffuse leads.  Patient treated with 40 mg IV Lasix in the ED.    Date:  6/26/21: Patient seen and examined in bed no acute distress, complaining of dyspnea.  Discussed with RN.  6/27/21 patient seen and examined in bed no acute distress, no new complaints, says feels better.  Vital signs stable.  Discussed with RN.  6/28/21: Patient seen and examined in bed no acute distress, no new complaints, discussed with RN.  Dyspnea presently on 4 L of oxygen.  6/29/21 patient seen and examined in bed no acute distress.  He wants to have regular diet.  Discussed with RN.   "cardiology wants to observe till tomorrow.  6/30/21 seen and examined in bed no acute distress, discussed with RN.  Awaiting pre-CERT for placement.  7/1/2021, patient seen and examined in bed no acute distress, denies any chest pain or cough.  Awaiting placement, pre-CERT pending.  Discussed with RN.    Review of Systems   Constitutional: Positive for malaise/fatigue.   HENT: Negative.    Eyes: Negative.    Cardiovascular: Negative.    Respiratory: Negative.    Endocrine: Negative.    Hematologic/Lymphatic: Negative.    Skin: Negative.    Musculoskeletal: Positive for muscle weakness.   Gastrointestinal: Negative.    Genitourinary: Negative.    Neurological: Positive for weakness.   Psychiatric/Behavioral: Negative.    Allergic/Immunologic: Negative.    All other systems reviewed and are negative.      Objective   Objective      Vital Signs  Temp:  [97.7 °F (36.5 °C)-98.1 °F (36.7 °C)] 97.7 °F (36.5 °C)  Heart Rate:  [59-62] 60  Resp:  [16-18] 18  BP: (118-138)/(52-72) 118/60  Oxygen Therapy  SpO2: 99 %  Pulse Oximetry Type: Intermittent  Device (Oxygen Therapy): nasal cannula  Flow (L/min): 3  Oxygen Concentration (%): 36  Flowsheet Rows      First Filed Value   Admission Height  177.8 cm (70\") Documented at 06/25/2021 1926   Admission Weight  113 kg (250 lb) Documented at 06/25/2021 1926        Intake & Output (last 3 days)       06/28 0701 - 06/29 0700 06/29 0701 - 06/30 0700 06/30 0701 - 07/01 0700 07/01 0701 - 07/02 0700    P.O. 360 1320 400     Total Intake(mL/kg) 360 (3.2) 1320 (11.7) 400 (3.5)     Urine (mL/kg/hr) 1025 (0.4) 800 (0.3) 1000 (0.4)     Stool  0      Total Output 0341 822 0354     Net -665 +520 -600             Urine Unmeasured Occurrence 1 x 2 x 1 x     Stool Unmeasured Occurrence 1 x 1 x          Lines, Drains & Airways    Active LDAs     Name:   Placement date:   Placement time:   Site:   Days:    Peripheral IV 06/25/21 2009 Left Forearm   06/25/21 2009    Forearm   less than 1          "     Physical Exam  Vitals and nursing note reviewed.   Constitutional:       General: He is not in acute distress.     Appearance: He is obese. He is not ill-appearing, toxic-appearing or diaphoretic.   HENT:      Head: Normocephalic and atraumatic.      Nose: Nose normal. No congestion or rhinorrhea.      Mouth/Throat:      Mouth: Mucous membranes are moist.      Pharynx: No oropharyngeal exudate.   Eyes:      General: No scleral icterus.        Right eye: No discharge.         Left eye: No discharge.      Extraocular Movements: Extraocular movements intact.      Conjunctiva/sclera: Conjunctivae normal.      Pupils: Pupils are equal, round, and reactive to light.   Neck:      Thyroid: No thyromegaly.      Vascular: No carotid bruit.   Cardiovascular:      Rate and Rhythm: Normal rate and regular rhythm.      Pulses: Normal pulses.      Heart sounds: Normal heart sounds. No murmur heard.   No friction rub. No gallop.    Pulmonary:      Effort: Pulmonary effort is normal. No respiratory distress.      Breath sounds: No stridor. No rales.      Comments: Diminished breath sounds bilaterally  Chest:      Chest wall: No tenderness.   Abdominal:      General: Bowel sounds are normal. There is no distension.      Palpations: Abdomen is soft. There is no mass.      Tenderness: There is no abdominal tenderness. There is no guarding or rebound.      Hernia: No hernia is present.   Musculoskeletal:         General: Swelling present. No tenderness, deformity or signs of injury. Normal range of motion.      Cervical back: Normal range of motion and neck supple. No rigidity. No muscular tenderness.      Right lower leg: No edema.      Left lower leg: No edema.   Skin:     General: Skin is warm and dry.      Coloration: Skin is not jaundiced or pale.      Findings: No bruising.   Neurological:      General: No focal deficit present.      Mental Status: He is alert. Mental status is at baseline.      Cranial Nerves: No cranial  nerve deficit.      Sensory: No sensory deficit.      Motor: No weakness or abnormal muscle tone.      Coordination: Coordination normal.               Procedures:    Results Review:     I reviewed the patient's new clinical results.      Lab Results (last 24 hours)     Procedure Component Value Units Date/Time    POC Glucose Once [[id number]]  (Abnormal) Collected: 07/01/21 1556    Specimen: Blood Updated: 07/01/21 1558     Glucose 184 mg/dL      Comment: Serial Number: [id number]:  [id number]       POC Glucose Once [[id number]]  (Abnormal) Collected: 07/01/21 1057    Specimen: Blood Updated: 07/01/21 1058     Glucose 172 mg/dL      Comment: Serial Number: [id number]:  [id number]       POC Glucose Once [[id number]]  (Abnormal) Collected: 07/01/21 0709    Specimen: Blood Updated: 07/01/21 0711     Glucose 110 mg/dL      Comment: Serial Number: [id number]:  [id number]       COVID-19,CEPHEID,COR/ZEYNEP/PAD/BEATRICE IN-HOUSE(OR EMERGENT/ADD-ON),NP SWAB IN TRANSPORT MEDIA 3-4 HR TAT, RT-PCR - Swab, Nasopharynx [[id number]]  (Normal) Collected: 06/30/21 2356    Specimen: Swab from Nasopharynx Updated: 07/01/21 0055     COVID19 Not Detected    Narrative:      Fact sheet for providers: https://www.fda.gov/media/143257/download     Fact sheet for patients: https://www.fda.gov/media/463374/download  Fact sheet for providers: https://www.fda.gov/media/631798/download     Fact sheet for patients: https://www.fda.gov/media/164637/download        No results found for: HGBA1C            No results found for: LIPASE  Lab Results   Component Value Date    CHOL 165 11/27/2020    CHLPL 116 09/28/2020    TRIG 198 (H) 11/27/2020    HDL 42 11/27/2020    LDL 89 11/27/2020       No results found for: INTRAOP, PREDX, FINALDX, COMDX    Microbiology Results (last 10 days)     Procedure Component Value - Date/Time    COVID-19,CEPHEID,COR/ZEYNEP/PAD/BEATRICE IN-HOUSE(OR EMERGENT/ADD-ON),NP SWAB IN TRANSPORT MEDIA 3-4 HR TAT, RT-PCR - Swab,  Nasopharynx [[id number]]  (Normal) Collected: 06/30/21 2356    Lab Status: Final result Specimen: Swab from Nasopharynx Updated: 07/01/21 0055     COVID19 Not Detected    Narrative:      Fact sheet for providers: https://www.fda.gov/media/578287/download     Fact sheet for patients: https://www.fda.gov/media/429696/download  Fact sheet for providers: https://www.fda.gov/media/247890/download     Fact sheet for patients: https://www.fda.gov/media/218529/download          ECG/EMG Results (most recent)     Procedure Component Value Units Date/Time    ECG 12 Lead [[id number]] Collected: 06/25/21 1934     Updated: 06/26/21 0755     QT Interval 401 ms     Narrative:      HEART RATE= 72  bpm  RR Interval= 952  ms  IA Interval= 262  ms  P Horizontal Axis= -8  deg  P Front Axis= 0  deg  QRSD Interval= 102  ms  QT Interval= 401  ms  QRS Axis= 33  deg  T Wave Axis= 228  deg  - ABNORMAL ECG -  Sinus rhythm  Atrial premature complexes  Prolonged IA interval  Nonspecific repol abnormality, diffuse leads  Electronically Signed By: Oscar Dang (ZEYNEP) 26-Jun-2021 07:54:46  Date and Time of Study: 2021-06-25 19:34:39    ECG 12 Lead [[id number]] Collected: 06/26/21 0659     Updated: 06/26/21 1108     QT Interval 356 ms     Narrative:      HEART RATE= 73  bpm  RR Interval= 824  ms  IA Interval=   ms  P Horizontal Axis=   deg  P Front Axis=   deg  QRSD Interval= 104  ms  QT Interval= 356  ms  QRS Axis= 26  deg  T Wave Axis= 210  deg  - ABNORMAL ECG -  Atrial fibrillation  Ventricular premature complex  Nonspecific repol abnormality, diffuse leads  When compared with ECG of 25-Jun-2021 19:34:39,  Significant change in rhythm: previously sinus  Electronically Signed By: Jacques Nichole ([facility]) 26-Jun-2021 11:07:37  Date and Time of Study: 2021-06-26 06:59:22              Results for orders placed during the hospital encounter of 05/06/21    Adult Transthoracic Echo Complete w/ Color, Spectral and Contrast if necessary per  protocol    Interpretation Summary  Technically difficult study due to poor acoustic windows, not all left ventricular walls are well visualized therefore Lumason contrast was given to assess LV function..  LVE with basal inferior wall hypokinesis and septal dyskinesis, estimated LV ejection fraction of 35%  Normal RV size  Biatrial enlargement seen.  Aortic valve, mitral valve, tricuspid valve appears structurally normal, no significant regurgitation seen.  No pericardial effusion seen.  Proximal aorta appears normal in size.      XR Knee 4+ View Right    Result Date: 6/28/2021  1.No evidence for displaced fracture or dislocation. 2.Tricompartmental arthropathy suggesting osteoarthritis. 3.Evidence for multiple osteochondromas associated with the proximal right tibia. Similar findings are seen on the left on the prior x-rays. The findings suggest multiple hereditary exostoses.  Electronically Signed By-Yony Thomason MD On:6/28/2021 3:05 PM This report was finalized on [id number] by  Yony Thomason MD.    XR Chest 1 View    Result Date: 6/25/2021  1.Hazy bilateral airspace opacities, which could reflect pulmonary edema or pneumonia. 2.Small bilateral pleural effusions.  Electronically Signed By-Yony Flores MD On:6/25/2021 8:24 PM This report was finalized on [id number] by  Yony Flores MD.          Xrays, labs reviewed personally by physician.    Medication Review:   I have reviewed the patient's current medication list      Scheduled Meds  albuterol, 2.5 mg, Nebulization, 4x Daily - RT  apixaban, 5 mg, Oral, BID  aspirin, 81 mg, Oral, Daily  atenolol, 100 mg, Oral, Daily  atorvastatin, 40 mg, Oral, Nightly  cholecalciferol, 1,000 Units, Oral, Daily  clopidogrel, 75 mg, Oral, Daily  digoxin, 125 mcg, Oral, Daily  donepezil, 10 mg, Oral, Nightly  DULoxetine, 60 mg, Oral, Daily  furosemide, 40 mg, Intravenous, Q8H  gabapentin, 200 mg, Oral, Q12H  hydrALAZINE, 50 mg, Oral, Q12H  insulin lispro, 0-14  Units, Subcutaneous, TID AC  insulin NPH-insulin regular, 30 Units, Subcutaneous, BID With Meals  isosorbide mononitrate, 60 mg, Oral, Daily  sodium chloride, 10 mL, Intravenous, Q12H        Meds Infusions       Meds PRN  •  acetaminophen **OR** acetaminophen **OR** acetaminophen  •  aluminum-magnesium hydroxide-simethicone  •  dextrose  •  dextrose  •  glucagon (human recombinant)  •  HYDROcodone-acetaminophen  •  insulin lispro **AND** insulin lispro  •  ipratropium-albuterol  •  melatonin  •  nitroglycerin  •  ondansetron **OR** ondansetron  •  [COMPLETED] Insert peripheral IV **AND** sodium chloride  •  sodium chloride        Assessment/Plan   Assessment/Plan     Active Hospital Problems    Diagnosis  POA   • **Acute on chronic congestive heart failure (CMS/HCC) [I50.9]  Yes   • CKD (chronic kidney disease) stage 3, GFR 30-59 ml/min (CMS/[facility]) [N18.30]  Yes   • Acute respiratory distress [R06.03]  Yes   • Depressive disorder [F32.9]  Yes   • History of cerebrovascular accident [Z86.73]  Not Applicable   • Chronic obstructive pulmonary disease, unspecified (CMS/[facility]) [J44.9]  Yes   • Unspecified dementia with behavioral disturbance (CMS/[facility]) [F03.91]  Yes   • Paroxysmal atrial fibrillation (CMS/[facility]) [I48.0]  Yes   • Immobility syndrome [M62.3]  Yes   • Major depressive disorder, recurrent, mild (CMS/[facility]) [F33.0]  Yes   • Elevated troponin [R77.8]  Yes   • S/P CABG (coronary artery bypass graft) [Z95.1]  Not Applicable   • Essential hypertension [I10]  Yes   • Dyslipidemia [E78.5]  Yes   • Diabetes mellitus due to underlying condition without complication, without long-term current use of insulin (CMS/[facility]) [E08.9]  Yes   • Coronary artery disease [I25.10]  Yes   • ANNETTE treated with BiPAP [G47.33]  Yes   • Diabetic neuropathy (CMS/[facility]) [E11.40]  Yes      Resolved Hospital Problems   No resolved problems to display.       MEDICAL DECISION MAKING COMPLEXITY BY PROBLEM:   Acute congestive heart failure:  Improving  -Secondary to hypertensive heart disease  -Upon arrival to the ED proBNP 10,623  -40 mg IV Lasix given in the ED, continue every 8 hours  -Cardiogram on 05/07/2021 shows EF of 35%  -Continuous cardiac monitoring  -EKG now and every 6 hours x2  -Troponin level now and every 6 hours x 2  -O2 to keep sats greater than 90%     Elevated troponin:  -Upon arrival to the ED troponin 0.053  -Consulted cardiology   -EKG now and every 6 hours x 2  -Troponin level 0.05-0.06-0.04     CKD stage III:  -IV Lasix 40 mg every 8 hours  -Monitor BMP  -Monitor intake and output  -Avoid nephrotoxic medication     Paroxysmal atrial fibrillation:  -Continue home medication Eliquis 5 mg p.o. daily  -Continue home medication digoxin 125 mcg p.o. daily  -Continuous cardiac monitoring     Diabetes mellitus:  -Sliding scale insulin  -Continue home Lantus 30 units subcu twice daily  -Consistent carbohydrate diet  -Before meals and at bedtime Accu-Cheks  -Hypoglycemia protocol ordered  -Hold oral medications due to possible procedures     Essential hypertension:  -Continue home medication hydralazine 50 mg p.o. twice daily  -Continue home medication atenolol 100 mg p.o. daily     Dyslipidemia:  -Continue home medication atorvastatin 40 mg p.o. nightly     Obesity:  -encourage dietary modifications     Coronary artery disease/history of stent/history of CABG  -Continue ASA 81 mg p.o. daily  -Continue home medication Plavix 75 mg p.o. daily  -Continue home medication isosorbide 60 mg p.o. twice daily     COPD:  -O2 as needed to keep sats greater than 90%  -Albuterol nebulized treatment every 6 hours as needed for shortness of air        ANNETTE with BiPAP:  -O2 as needed to keep sats greater than 90%  -May use home CPAP nightly as needed     Immobility syndrome:  -Continue home medication hydrocodone-acetaminophen 7.5-325 mg p.o. twice daily as needed for pain  -Inspect verified     History of CVA/right hemiaplasia:  -CVA in November  2020     Diabetic neuropathy:  -Continue home medication Cymbalta 60 mg p.o. daily     Depression:  -Continue home medication Cymbalta 60 mg p.o. daily     Unspecified dementia:  -Continue home medication Aricept 10 mg p.o. nightly            VTE Prophylaxis -   Mechanical Order History:      Ordered        06/25/21 2146  Place Sequential Compression Device  Once         06/25/21 2146  Maintain Sequential Compression Device  Continuous                 Pharmalogical Order History:      Ordered     Dose Route Frequency Stop    06/26/21 0800  apixaban (ELIQUIS) tablet 5 mg      5 mg PO 2 Times Daily --                  Code Status -   Code Status and Medical Interventions:   Ordered at: 06/25/21 2146     Level Of Support Discussed With:    Patient     Code Status:    CPR     Medical Interventions (Level of Support Prior to Arrest):    Full       This patient has been examined wearing appropriate Personal Protective Equipment and discussed with rn. 07/01/21        Discharge Planning  nh        Electronically signed by Marquez Mcfarlane MD, 07/01/21, 16:12 EDT.  [facility] Hospitalist Team

## 2021-07-01 NOTE — CASE MANAGEMENT/SOCIAL WORK
Social Work Assessment  [facility]     Patient Name: Benson Mclean  MRN: [id number]  Today's Date: 7/1/2021    Admit Date: 6/25/2021     Discharge Plan     Row Name 07/01/21 1052       Plan    Plan  DC Plan: Return to [place] pending precert. Precert started 7/1, PENDING. No PASRR needed for return.    Patient/Family in Agreement with Plan  yes    Plan Comments  Per liaison (Maricruz) precert started 7/1, pending. Pharmacy updated for [facility] for [place].     Phone communication or documentation only - no physical contact with patient or family.  FRANCIA Villa    Phone: [phone]  Cell: [phone]  Fax: [phone]  [email or web address]

## 2021-07-01 NOTE — CASE MANAGEMENT/SOCIAL WORK
Case Management Readmission Assessment Note       Case Management Readmission Assessment (all recorded)      Readmission Interview     Row Name 07/01/21 1113             Readmission Indications    Is this hospitalization related to the prior hospital diagnosis?  Yes      What was the reason you were admitted?  On 5/18-6/3 patient was admitted with hypoxia, anemia, afib and acute on chronic chf. He dc to [facility] for rehab. On 6/25-6/28 patient was readmitted under OBS and changed toinpatient on 6/28. He presented to ER from [facility] with incresed SOA and chest tightness. pBNP 61273 and troponin 0.053/ CXR with pulmonary edema vs pna and small biateral effusion      Row Name 07/01/21 1113             Recommendation for rehospitalization    Who recommended you return to the hospital?  Other (comment) facility      Row Name 07/01/21 1113             Follow up appointment    Do you have a PCP?  Yes      Did you have an appointment with PCP/specialist after hospitalization within 7 days?  -- seen by facility provider      [facility] Name 07/01/21 1113             Medications    Did you have newly prescribed medications at discharge?  Yes      Did you understand the reasons for your medications at discharge and how to take them?  -- meds given at facility      Row Name 07/01/21 1113             Discharge Instructions    Did you understand your discharge instructions?  -- avs sent to facility      Did your family/caregiver hear your instructions?  No      Were you told to eat a special diet?  No      Were you given a number of someone to call if you had questions or concerns?  Yes on avs      [facility] Name 07/01/21 1113             Index discharge location/services    Where did you go upon discharge?  Skilled Nursing Facility [place]      Row Name 07/01/21 1113             Discharge Readiness    On a scale of 1-5 (5 being well prepared), how ready were you for discharge  3      Recommendation based on interview  Education on  diagnosis/self management;Additional caregiver support;Goals of care discussion/advanced care planning         Phone communication or documentation only - no physical contact with patient or family.  Tiffanie Hays RN  Complex Case Manager  [facility] Care Coordination  [phone]-cell  [phone]-office  [phone]-fax  [email or web address]

## 2021-07-01 NOTE — THERAPY TREATMENT NOTE
Subjective: Pt agreeable to therapeutic plan of care.    Objective:     Bed mobility - Mod-A, Struggling c side lying to sit.  Transfers - Mod-A and with rolling walker, low bed.  Ambulation - 3 feet Mod-A and with rolling walker, SPS to chair. Delayed, slow to advance, vcs to stay on task, weakness/instablity present. High falls risk, tends to lean laterally right and posterior requiring Mod A to maintain cog over [place].  Seated arom exercises using 4Es in available planes.  Pain: 8 VAS R knee  Education: Provided education on importance of mobility and skilled verbal / tactile cueing throughout intervention.     Assessment: Benson Mclean presents with functional mobility impairments which indicate the need for skilled intervention. Tolerating session today without incident. Will continue to follow and progress as tolerated.     Plan/Recommendations:   Pt would benefit from Inpatient Rehabilitation placement at discharge from facility and requires no DME at discharge.   Pt desires Inpatient Rehabilitation placement at discharge. Pt cooperative; agreeable to therapeutic recommendations and plan of care.     Basic Mobility 6-click:  Rollin = Total, A lot = 2, A little = 3; 4 = None  Supine>Sit:   1 = Total, A lot = 2, A little = 3; 4 = None   Sit>Stand with arms:  1 = Total, A lot = 2, A little = 3; 4 = None  Bed>Chair:   1 = Total, A lot = 2, A little = 3; 4 = None  Ambulate in room:  1 = Total, A lot = 2, A little = 3; 4 = None  3-5 Steps with railin = Total, A lot = 2, A little = 3; 4 = None  Score: 13    Modified Fredy: 4 = Moderately severe disability (Unable to attend to own bodily needs without assistance, and unable to walk unassisted)     Post-Tx Position: Up in Chair, Alarms activated and Call light and personal items within reach  PPE: gloves, surgical mask, eyewear protection

## 2021-07-02 VITALS
BODY MASS INDEX: 35.89 KG/M2 | OXYGEN SATURATION: 100 % | SYSTOLIC BLOOD PRESSURE: 171 MMHG | WEIGHT: 250.66 LBS | DIASTOLIC BLOOD PRESSURE: 74 MMHG | TEMPERATURE: 98.2 F | HEART RATE: 60 BPM | RESPIRATION RATE: 18 BRPM | HEIGHT: 70 IN

## 2021-07-02 LAB
ANION GAP SERPL CALCULATED.3IONS-SCNC: 10 MMOL/L (ref 5–15)
BUN SERPL-MCNC: 52 MG/DL (ref 8–23)
BUN/CREAT SERPL: 25.7 (ref 7–25)
CALCIUM SPEC-SCNC: 9.1 MG/DL (ref 8.6–10.5)
CHLORIDE SERPL-SCNC: 98 MMOL/L (ref 98–107)
CO2 SERPL-SCNC: 34 MMOL/L (ref 22–29)
CREAT SERPL-MCNC: 2.02 MG/DL (ref 0.76–1.27)
DEPRECATED RDW RBC AUTO: 45.1 FL (ref 37–54)
ERYTHROCYTE [DISTWIDTH] IN BLOOD BY AUTOMATED COUNT: 14.6 % (ref 12.3–15.4)
GFR SERPL CREATININE-BSD FRML MDRD: 33 ML/MIN/1.73
GLUCOSE BLDC GLUCOMTR-MCNC: 106 MG/DL (ref 70–105)
GLUCOSE BLDC GLUCOMTR-MCNC: 162 MG/DL (ref 70–105)
GLUCOSE BLDC GLUCOMTR-MCNC: 166 MG/DL (ref 70–105)
GLUCOSE BLDC GLUCOMTR-MCNC: 90 MG/DL (ref 70–105)
GLUCOSE SERPL-MCNC: 94 MG/DL (ref 65–99)
HCT VFR BLD AUTO: 30.5 % (ref 37.5–51)
HGB BLD-MCNC: 10.3 G/DL (ref 13–17.7)
MCH RBC QN AUTO: 29.6 PG (ref 26.6–33)
MCHC RBC AUTO-ENTMCNC: 33.6 G/DL (ref 31.5–35.7)
MCV RBC AUTO: 88 FL (ref 79–97)
PLATELET # BLD AUTO: 319 10*3/MM3 (ref 140–450)
PMV BLD AUTO: 7.6 FL (ref 6–12)
POTASSIUM SERPL-SCNC: 4 MMOL/L (ref 3.5–5.2)
RBC # BLD AUTO: 3.47 10*6/MM3 (ref 4.14–5.8)
SODIUM SERPL-SCNC: 142 MMOL/L (ref 136–145)
WBC # BLD AUTO: 6.9 10*3/MM3 (ref 3.4–10.8)

## 2021-07-02 PROCEDURE — 97530 THERAPEUTIC ACTIVITIES: CPT

## 2021-07-02 PROCEDURE — 97110 THERAPEUTIC EXERCISES: CPT

## 2021-07-02 PROCEDURE — 85027 COMPLETE CBC AUTOMATED: CPT | Performed by: INTERNAL MEDICINE

## 2021-07-02 PROCEDURE — 97535 SELF CARE MNGMENT TRAINING: CPT

## 2021-07-02 PROCEDURE — 63710000001 INSULIN ISOPHANE & REGULAR PER 5 UNITS: Performed by: NURSE PRACTITIONER

## 2021-07-02 PROCEDURE — 63710000001 INSULIN LISPRO (HUMAN) PER 5 UNITS: Performed by: INTERNAL MEDICINE

## 2021-07-02 PROCEDURE — 94799 UNLISTED PULMONARY SVC/PX: CPT

## 2021-07-02 PROCEDURE — 80048 BASIC METABOLIC PNL TOTAL CA: CPT | Performed by: INTERNAL MEDICINE

## 2021-07-02 PROCEDURE — 25010000002 FUROSEMIDE PER 20 MG: Performed by: INTERNAL MEDICINE

## 2021-07-02 PROCEDURE — 82962 GLUCOSE BLOOD TEST: CPT

## 2021-07-02 PROCEDURE — 97116 GAIT TRAINING THERAPY: CPT

## 2021-07-02 PROCEDURE — 99239 HOSP IP/OBS DSCHRG MGMT >30: CPT | Performed by: INTERNAL MEDICINE

## 2021-07-02 RX ORDER — HYDROCODONE BITARTRATE AND ACETAMINOPHEN 7.5; 325 MG/1; MG/1
1 TABLET ORAL EVERY 12 HOURS PRN
Qty: 10 TABLET | Refills: 0 | Status: SHIPPED | OUTPATIENT
Start: 2021-07-02 | End: 2021-08-02 | Stop reason: HOSPADM

## 2021-07-02 RX ORDER — GABAPENTIN 100 MG/1
200 CAPSULE ORAL 2 TIMES DAILY
Qty: 6 CAPSULE | Refills: 0 | Status: SHIPPED | OUTPATIENT
Start: 2021-07-02 | End: 2021-08-30

## 2021-07-02 RX ORDER — NITROGLYCERIN 0.4 MG/1
0.4 TABLET SUBLINGUAL
Qty: 30 TABLET | Refills: 12 | Status: SHIPPED | OUTPATIENT
Start: 2021-07-02 | End: 2021-08-25 | Stop reason: HOSPADM

## 2021-07-02 RX ORDER — ONDANSETRON 4 MG/1
4 TABLET, FILM COATED ORAL EVERY 6 HOURS PRN
Qty: 30 TABLET | Refills: 0 | Status: SHIPPED | OUTPATIENT
Start: 2021-07-02 | End: 2021-08-02 | Stop reason: HOSPADM

## 2021-07-02 RX ORDER — FUROSEMIDE 40 MG/1
40 TABLET ORAL 2 TIMES DAILY
Qty: 60 TABLET | Refills: 0 | Status: ON HOLD | OUTPATIENT
Start: 2021-07-02 | End: 2021-08-06

## 2021-07-02 RX ADMIN — FUROSEMIDE 40 MG: 10 INJECTION, SOLUTION INTRAMUSCULAR; INTRAVENOUS at 17:36

## 2021-07-02 RX ADMIN — ATENOLOL 100 MG: 50 TABLET ORAL at 08:21

## 2021-07-02 RX ADMIN — FUROSEMIDE 40 MG: 10 INJECTION, SOLUTION INTRAMUSCULAR; INTRAVENOUS at 05:26

## 2021-07-02 RX ADMIN — ALBUTEROL SULFATE 2.5 MG: 2.5 SOLUTION RESPIRATORY (INHALATION) at 07:20

## 2021-07-02 RX ADMIN — GABAPENTIN 200 MG: 100 CAPSULE ORAL at 08:21

## 2021-07-02 RX ADMIN — INSULIN HUMAN 30 UNITS: 100 INJECTION, SUSPENSION SUBCUTANEOUS at 17:36

## 2021-07-02 RX ADMIN — HYDRALAZINE HYDROCHLORIDE 50 MG: 25 TABLET, FILM COATED ORAL at 08:22

## 2021-07-02 RX ADMIN — Medication 10 ML: at 08:22

## 2021-07-02 RX ADMIN — Medication 1000 UNITS: at 08:21

## 2021-07-02 RX ADMIN — INSULIN LISPRO 3 UNITS: 100 INJECTION, SOLUTION INTRAVENOUS; SUBCUTANEOUS at 12:00

## 2021-07-02 RX ADMIN — INSULIN HUMAN 30 UNITS: 100 INJECTION, SUSPENSION SUBCUTANEOUS at 08:22

## 2021-07-02 RX ADMIN — ASPIRIN 81 MG CHEWABLE TABLET 81 MG: 81 TABLET CHEWABLE at 08:21

## 2021-07-02 RX ADMIN — DULOXETINE 60 MG: 30 CAPSULE, DELAYED RELEASE ORAL at 08:21

## 2021-07-02 RX ADMIN — APIXABAN 5 MG: 5 TABLET, FILM COATED ORAL at 08:21

## 2021-07-02 RX ADMIN — DIGOXIN 125 MCG: 125 TABLET ORAL at 11:58

## 2021-07-02 RX ADMIN — ALBUTEROL SULFATE 2.5 MG: 2.5 SOLUTION RESPIRATORY (INHALATION) at 15:05

## 2021-07-02 RX ADMIN — INSULIN LISPRO 3 UNITS: 100 INJECTION, SOLUTION INTRAVENOUS; SUBCUTANEOUS at 17:36

## 2021-07-02 RX ADMIN — CLOPIDOGREL BISULFATE 75 MG: 75 TABLET ORAL at 08:22

## 2021-07-02 RX ADMIN — ISOSORBIDE MONONITRATE 60 MG: 60 TABLET, EXTENDED RELEASE ORAL at 08:21

## 2021-07-02 NOTE — PROGRESS NOTES
[facility] Medicine Services Daily Progress Note      Hospitalist Team  LOS 4 days      Patient Care Team:  Samantha Zabala APRN as PCP - General  Samantha Zabala APRN as PCP - Family Medicine  Jose G Rm MD as Consulting Physician (Cardiology)    Patient Location: 382/1      Subjective   Subjective     Chief Complaint / Subjective  Chief Complaint   Patient presents with   • Shortness of Breath     from [place] [facility].  SOA since yesterday.         Brief Synopsis of Hospital Course/HPI  Mr. Mclean is a 70 y.o. male w/PMH of CHF, CKD, DM, HTN, HLD, CAD, COPD, A. fib, DM, ANNETTE, depression, immobility syndrome, CVA, neuropathy, dementia who presents to [facility] ED due to dyspnea.  Patient has dementia and is a poor historian, unable to complete review of systems at this time.        Upon arrival to the ED vitals temp 98.2, HR 62, RR 18, /77, O2 sats 97% on 3 L nasal cannula.  Labs notable for troponin 0.053, proBNP 10,623, glucose 138, , K4.6, CO2 30, BUN 38, creatinine 1.5, ALT 5, AST 8, mag 1.9, WBC 6.7, Hgb 8.8, platelets 311, neutrophils 55.  Chest x-ray shows hazy bilateral airspace opacities which could reflect pulmonary edema or pneumonia, small bilateral pleural effusions.  EKG showed sinus rhythm rate 72, atrial premature complexes, prolonged [place] interval nonspecific repolarization abnormality in diffuse leads.  Patient treated with 40 mg IV Lasix in the ED.    Date:  6/26/21: Patient seen and examined in bed no acute distress, complaining of dyspnea.  Discussed with RN.  6/27/21 patient seen and examined in bed no acute distress, no new complaints, says feels better.  Vital signs stable.  Discussed with RN.  6/28/21: Patient seen and examined in bed no acute distress, no new complaints, discussed with RN.  Dyspnea presently on 4 L of oxygen.  6/29/21 patient seen and examined in bed no acute distress.  He wants to have regular diet.  Discussed with RN.   "cardiology wants to observe till tomorrow.  6/30/21 seen and examined in bed no acute distress, discussed with RN.  Awaiting pre-CERT for placement.  7/1/2021, patient seen and examined in bed no acute distress, denies any chest pain or cough.  Awaiting placement, pre-CERT pending.  Discussed with RN.  7/2/21 patient seen and examined in bed no acute distress, awaiting placement.  Discussed with RN.  Pre-CERT pending    Review of Systems   Constitutional: Positive for malaise/fatigue.   HENT: Negative.    Eyes: Negative.    Cardiovascular: Negative.    Respiratory: Negative.    Endocrine: Negative.    Hematologic/Lymphatic: Negative.    Skin: Negative.    Musculoskeletal: Positive for muscle weakness.   Gastrointestinal: Negative.    Genitourinary: Negative.    Neurological: Positive for weakness.   Psychiatric/Behavioral: Negative.    Allergic/Immunologic: Negative.    All other systems reviewed and are negative.      Objective   Objective      Vital Signs  Temp:  [97.4 °F (36.3 °C)-97.9 °F (36.6 °C)] 97.4 °F (36.3 °C)  Heart Rate:  [59-78] 59  Resp:  [18-20] 19  BP: (101-153)/(52-72) 101/52  Oxygen Therapy  SpO2: 100 %  Pulse Oximetry Type: Intermittent  Device (Oxygen Therapy): nasal cannula  Flow (L/min): 3  Oxygen Concentration (%): 36  Flowsheet Rows      First Filed Value   Admission Height  177.8 cm (70\") Documented at 06/25/2021 1926   Admission Weight  113 kg (250 lb) Documented at 06/25/2021 1926        Intake & Output (last 3 days)       06/29 0701 - 06/30 0700 06/30 0701 - 07/01 0700 07/01 0701 - 07/02 0700 07/02 0701 - 07/03 0700    P.O. 1320 400  240    Total Intake(mL/kg) 1320 (11.7) 400 (3.5)  240 (2.1)    Urine (mL/kg/hr) 800 (0.3) 1000 (0.4) 1500 (0.5) 100 (0.1)    Stool 0       Total Output 800 1000 1500 100    Net +520 -600 -1500 +140            Urine Unmeasured Occurrence 2 x 1 x      Stool Unmeasured Occurrence 1 x           Lines, Drains & Airways    Active LDAs     Name:   Placement date:   " Placement time:   Site:   Days:    Peripheral IV 06/25/21 2009 Left Forearm   06/25/21 2009    Forearm   less than 1              Physical Exam  Vitals and nursing note reviewed.   Constitutional:       General: He is not in acute distress.     Appearance: He is obese. He is not ill-appearing, toxic-appearing or diaphoretic.   HENT:      Head: Normocephalic and atraumatic.      Nose: Nose normal. No congestion or rhinorrhea.      Mouth/Throat:      Mouth: Mucous membranes are moist.      Pharynx: No oropharyngeal exudate.   Eyes:      General: No scleral icterus.        Right eye: No discharge.         Left eye: No discharge.      Extraocular Movements: Extraocular movements intact.      Conjunctiva/sclera: Conjunctivae normal.      Pupils: Pupils are equal, round, and reactive to light.   Neck:      Thyroid: No thyromegaly.      Vascular: No carotid bruit.   Cardiovascular:      Rate and Rhythm: Normal rate and regular rhythm.      Pulses: Normal pulses.      Heart sounds: Normal heart sounds. No murmur heard.   No friction rub. No gallop.    Pulmonary:      Effort: Pulmonary effort is normal. No respiratory distress.      Breath sounds: No stridor. No rales.      Comments: Diminished breath sounds bilaterally  Chest:      Chest wall: No tenderness.   Abdominal:      General: Bowel sounds are normal. There is no distension.      Palpations: Abdomen is soft. There is no mass.      Tenderness: There is no abdominal tenderness. There is no guarding or rebound.      Hernia: No hernia is present.   Musculoskeletal:         General: Swelling present. No tenderness, deformity or signs of injury. Normal range of motion.      Cervical back: Normal range of motion and neck supple. No rigidity. No muscular tenderness.      Right lower leg: No edema.      Left lower leg: No edema.   Skin:     General: Skin is warm and dry.      Coloration: Skin is not jaundiced or pale.      Findings: No bruising.   Neurological:       General: No focal deficit present.      Mental Status: He is alert. Mental status is at baseline.      Cranial Nerves: No cranial nerve deficit.      Sensory: No sensory deficit.      Motor: No weakness or abnormal muscle tone.      Coordination: Coordination normal.               Procedures:    Results Review:     I reviewed the patient's new clinical results.      Lab Results (last 24 hours)     Procedure Component Value Units Date/Time    POC Glucose Once [[id number]]  (Abnormal) Collected: 07/02/21 1117    Specimen: Blood Updated: 07/02/21 1118     Glucose 166 mg/dL      Comment: Serial Number: [id number]:  [id number]       Basic Metabolic Panel [[id number]]  (Abnormal) Collected: 07/02/21 0756    Specimen: Blood Updated: 07/02/21 0840     Glucose 94 mg/dL      BUN 52 mg/dL      Creatinine 2.02 mg/dL      Sodium 142 mmol/L      Potassium 4.0 mmol/L      Chloride 98 mmol/L      CO2 34.0 mmol/L      Calcium 9.1 mg/dL      eGFR Non African Amer 33 mL/min/1.73      BUN/Creatinine Ratio 25.7     Anion Gap 10.0 mmol/L     Narrative:      GFR Normal >60  Chronic Kidney Disease <60  Kidney Failure <15      CBC (No Diff) [[id number]]  (Abnormal) Collected: 07/02/21 0756    Specimen: Blood Updated: 07/02/21 0806     WBC 6.90 10*3/mm3      RBC 3.47 10*6/mm3      Hemoglobin 10.3 g/dL      Hematocrit 30.5 %      MCV 88.0 fL      MCH 29.6 pg      MCHC 33.6 g/dL      RDW 14.6 %      RDW-SD 45.1 fl      MPV 7.6 fL      Platelets 319 10*3/mm3     POC Glucose Once [[id number]]  (Normal) Collected: 07/02/21 0710    Specimen: Blood Updated: 07/02/21 0711     Glucose 90 mg/dL      Comment: Serial Number: [id number]:  [id number]       POC Glucose Once [[id number]]  (Abnormal) Collected: 07/01/21 2018    Specimen: Blood Updated: 07/01/21 2019     Glucose 128 mg/dL      Comment: Serial Number: [id number]:  [id number]           No results found for: HGBA1C            No results found for: LIPASE  Lab Results   Component  Value Date    CHOL 165 11/27/2020    CHLPL 116 09/28/2020    TRIG 198 (H) 11/27/2020    HDL 42 11/27/2020    LDL 89 11/27/2020       No results found for: INTRAOP, PREDX, FINALDX, COMDX    Microbiology Results (last 10 days)     Procedure Component Value - Date/Time    COVID-19,CEPHEID,COR/ZEYNEP/PAD/BEATRICE IN-HOUSE(OR EMERGENT/ADD-ON),NP SWAB IN TRANSPORT MEDIA 3-4 HR TAT, RT-PCR - Swab, Nasopharynx [[id number]]  (Normal) Collected: 06/30/21 2356    Lab Status: Final result Specimen: Swab from Nasopharynx Updated: 07/01/21 0055     COVID19 Not Detected    Narrative:      Fact sheet for providers: https://www.fda.gov/media/788158/download     Fact sheet for patients: https://www.fda.gov/media/973760/download  Fact sheet for providers: https://www.fda.gov/media/895227/download     Fact sheet for patients: https://www.fda.gov/media/854515/download          ECG/EMG Results (most recent)     Procedure Component Value Units Date/Time    ECG 12 Lead [[id number]] Collected: 06/25/21 1934     Updated: 06/26/21 0755     QT Interval 401 ms     Narrative:      HEART RATE= 72  bpm  RR Interval= 952  ms  MN Interval= 262  ms  P Horizontal Axis= -8  deg  P Front Axis= 0  deg  QRSD Interval= 102  ms  QT Interval= 401  ms  QRS Axis= 33  deg  T Wave Axis= 228  deg  - ABNORMAL ECG -  Sinus rhythm  Atrial premature complexes  Prolonged MN interval  Nonspecific repol abnormality, diffuse leads  Electronically Signed By: Oscar Dang (ZEYNEP) 26-Jun-2021 07:54:46  Date and Time of Study: 2021-06-25 19:34:39    ECG 12 Lead [[id number]] Collected: 06/26/21 0659     Updated: 06/26/21 1108     QT Interval 356 ms     Narrative:      HEART RATE= 73  bpm  RR Interval= 824  ms  MN Interval=   ms  P Horizontal Axis=   deg  P Front Axis=   deg  QRSD Interval= 104  ms  QT Interval= 356  ms  QRS Axis= 26  deg  T Wave Axis= 210  deg  - ABNORMAL ECG -  Atrial fibrillation  Ventricular premature complex  Nonspecific repol abnormality, diffuse leads  When compared  with ECG of 25-Jun-2021 19:34:39,  Significant change in rhythm: previously sinus  Electronically Signed By: Jacques Nichole ([facility]) 26-Jun-2021 11:07:37  Date and Time of Study: 2021-06-26 06:59:22              Results for orders placed during the hospital encounter of 05/06/21    Adult Transthoracic Echo Complete w/ Color, Spectral and Contrast if necessary per protocol    Interpretation Summary  Technically difficult study due to poor acoustic windows, not all left ventricular walls are well visualized therefore Lumason contrast was given to assess LV function..  LVE with basal inferior wall hypokinesis and septal dyskinesis, estimated LV ejection fraction of 35%  Normal RV size  Biatrial enlargement seen.  Aortic valve, mitral valve, tricuspid valve appears structurally normal, no significant regurgitation seen.  No pericardial effusion seen.  Proximal aorta appears normal in size.      XR Knee 4+ View Right    Result Date: 6/28/2021  1.No evidence for displaced fracture or dislocation. 2.Tricompartmental arthropathy suggesting osteoarthritis. 3.Evidence for multiple osteochondromas associated with the proximal right tibia. Similar findings are seen on the left on the prior x-rays. The findings suggest multiple hereditary exostoses.  Electronically Signed By-Yony Thomason MD On:6/28/2021 3:05 PM This report was finalized on [id number] by  Yony Thomason MD.    XR Chest 1 View    Result Date: 6/25/2021  1.Hazy bilateral airspace opacities, which could reflect pulmonary edema or pneumonia. 2.Small bilateral pleural effusions.  Electronically Signed By-Yony Flores MD On:6/25/2021 8:24 PM This report was finalized on [id number] by  Yony Flores MD.          Xrays, labs reviewed personally by physician.    Medication Review:   I have reviewed the patient's current medication list      Scheduled Meds  albuterol, 2.5 mg, Nebulization, 4x Daily - RT  apixaban, 5 mg, Oral, BID  aspirin, 81 mg, Oral,  Daily  atenolol, 100 mg, Oral, Daily  atorvastatin, 40 mg, Oral, Nightly  cholecalciferol, 1,000 Units, Oral, Daily  clopidogrel, 75 mg, Oral, Daily  digoxin, 125 mcg, Oral, Daily  donepezil, 10 mg, Oral, Nightly  DULoxetine, 60 mg, Oral, Daily  furosemide, 40 mg, Intravenous, Q8H  gabapentin, 200 mg, Oral, Q12H  hydrALAZINE, 50 mg, Oral, Q12H  insulin lispro, 0-14 Units, Subcutaneous, TID AC  insulin NPH-insulin regular, 30 Units, Subcutaneous, BID With Meals  isosorbide mononitrate, 60 mg, Oral, Daily  sodium chloride, 10 mL, Intravenous, Q12H        Meds Infusions       Meds PRN  •  acetaminophen **OR** acetaminophen **OR** acetaminophen  •  aluminum-magnesium hydroxide-simethicone  •  dextrose  •  dextrose  •  glucagon (human recombinant)  •  HYDROcodone-acetaminophen  •  insulin lispro **AND** insulin lispro  •  ipratropium-albuterol  •  melatonin  •  nitroglycerin  •  ondansetron **OR** ondansetron  •  [COMPLETED] Insert peripheral IV **AND** sodium chloride  •  sodium chloride        Assessment/Plan   Assessment/Plan     Active Hospital Problems    Diagnosis  POA   • **Acute on chronic congestive heart failure (CMS/HCC) [I50.9]  Yes   • CKD (chronic kidney disease) stage 3, GFR 30-59 ml/min (CMS/[facility]) [N18.30]  Yes   • Acute respiratory distress [R06.03]  Yes   • Depressive disorder [F32.9]  Yes   • History of cerebrovascular accident [Z86.73]  Not Applicable   • Chronic obstructive pulmonary disease, unspecified (CMS/[facility]) [J44.9]  Yes   • Unspecified dementia with behavioral disturbance (CMS/[facility]) [F03.91]  Yes   • Paroxysmal atrial fibrillation (CMS/[facility]) [I48.0]  Yes   • Immobility syndrome [M62.3]  Yes   • Major depressive disorder, recurrent, mild (CMS/HCC) [F33.0]  Yes   • Elevated troponin [R77.8]  Yes   • S/P CABG (coronary artery bypass graft) [Z95.1]  Not Applicable   • Essential hypertension [I10]  Yes   • Dyslipidemia [E78.5]  Yes   • Diabetes mellitus due to underlying condition without  complication, without long-term current use of insulin (CMS/[facility]) [E08.9]  Yes   • Coronary artery disease [I25.10]  Yes   • ANNETTE treated with BiPAP [G47.33]  Yes   • Diabetic neuropathy (CMS/[facility]) [E11.40]  Yes      Resolved Hospital Problems   No resolved problems to display.       MEDICAL DECISION MAKING COMPLEXITY BY PROBLEM:   Acute congestive heart failure: Improving  -Secondary to hypertensive heart disease  -Upon arrival to the ED proBNP 10,623  -40 mg IV Lasix given in the ED, continue every 8 hours  -Cardiogram on 05/07/2021 shows EF of 35%  -Continuous cardiac monitoring  -Troponin level-0.06-0.047   -O2 to keep sats greater than 90%     Elevated troponin:  -Upon arrival to the ED troponin 0.053  -Consulted cardiology   -EKG now and every 6 hours x 2  -Troponin level 0.05-0.06-0.04     CKD stage III:  -IV Lasix 40 mg every 8 hours  -Monitor BMP  -Monitor intake and output  -Avoid nephrotoxic medication     Paroxysmal atrial fibrillation:  -Continue home medication Eliquis 5 mg p.o. daily  -Continue home medication digoxin 125 mcg p.o. daily  -Continuous cardiac monitoring     Diabetes mellitus:  -Sliding scale insulin  -Continue home Lantus 30 units subcu twice daily  -Consistent carbohydrate diet  -Before meals and at bedtime Accu-Cheks  -Hypoglycemia protocol ordered  -Hold oral medications due to possible procedures     Essential hypertension:  -Continue home medication hydralazine 50 mg p.o. twice daily  -Continue home medication atenolol 100 mg p.o. daily     Dyslipidemia:  -Continue home medication atorvastatin 40 mg p.o. nightly     Obesity:  -encourage dietary modifications     Coronary artery disease/history of stent/history of CABG  -Continue ASA 81 mg p.o. daily  -Continue home medication Plavix 75 mg p.o. daily  -Continue home medication isosorbide 60 mg p.o. twice daily     COPD:  -O2 as needed to keep sats greater than 90%  -Albuterol nebulized treatment every 6 hours as needed for shortness of  air        ANNETTE with BiPAP:  -O2 as needed to keep sats greater than 90%  -May use home CPAP nightly as needed     Immobility syndrome:  -Continue home medication hydrocodone-acetaminophen 7.5-325 mg p.o. twice daily as needed for pain  -Inspect verified     History of CVA/right hemiaplasia:  -CVA in November 2020     Diabetic neuropathy:  -Continue home medication Cymbalta 60 mg p.o. daily     Depression:  -Continue home medication Cymbalta 60 mg p.o. daily     Unspecified dementia:  -Continue home medication Aricept 10 mg p.o. nightly            VTE Prophylaxis -   Mechanical Order History:      Ordered        06/25/21 2146  Place Sequential Compression Device  Once         06/25/21 2146  Maintain Sequential Compression Device  Continuous                 Pharmalogical Order History:      Ordered     Dose Route Frequency Stop    06/26/21 0800  apixaban (ELIQUIS) tablet 5 mg      5 mg PO 2 Times Daily --                  Code Status -   Code Status and Medical Interventions:   Ordered at: 06/25/21 2146     Level Of Support Discussed With:    Patient     Code Status:    CPR     Medical Interventions (Level of Support Prior to Arrest):    Full       This patient has been examined wearing appropriate Personal Protective Equipment and discussed with rn. 07/02/21        Discharge Planning  nh        Electronically signed by Marquez Mcfarlane MD, 07/02/21, 14:25 EDT.  [facility] Hospitalist Team

## 2021-07-02 NOTE — CASE MANAGEMENT/SOCIAL WORK
Discharge Planning Assessment   [place]     Patient Name: Benson Mclean  MRN: [id number]  Today's Date: 7/2/2021    Admit Date: 6/25/2021          Plan    Plan Comments  barrier to discharge is pending precert approval       Carol naegele rn  Case management  Office number [phone]  Cell phone [phone]

## 2021-07-02 NOTE — DISCHARGE SUMMARY
[facility] Medicine Services  DISCHARGE SUMMARY        Prepared For PCP:  Samantha Zabala APRN    Patient Name: Benson Mclean  : 1950  MRN: [id number]      Date of Admission:   2021    Date of Discharge:  2021    Length of stay:  LOS: 4 days     Hospital Course     Presenting Problem:   Acute respiratory distress [R06.03]  Acute on chronic congestive heart failure, unspecified heart failure type (CMS/HCC) [I50.9]      Active Hospital Problems    Diagnosis  POA   • **Acute on chronic congestive heart failure (CMS/HCC) [I50.9]  Yes     Priority: High   • CKD (chronic kidney disease) stage 3, GFR 30-59 ml/min (CMS/[facility]) [N18.30]  Yes   • Acute respiratory distress [R06.03]  Yes   • Depressive disorder [F32.9]  Yes   • History of cerebrovascular accident [Z86.73]  Not Applicable   • Chronic obstructive pulmonary disease, unspecified (CMS/[facility]) [J44.9]  Yes   • Unspecified dementia with behavioral disturbance (CMS/[facility]) [F03.91]  Yes   • Paroxysmal atrial fibrillation (CMS/[facility]) [I48.0]  Yes   • Immobility syndrome [M62.3]  Yes   • Major depressive disorder, recurrent, mild (CMS/[facility]) [F33.0]  Yes   • Elevated troponin [R77.8]  Yes   • S/P CABG (coronary artery bypass graft) [Z95.1]  Not Applicable   • Essential hypertension [I10]  Yes   • Dyslipidemia [E78.5]  Yes   • Diabetes mellitus due to underlying condition without complication, without long-term current use of insulin (CMS/[facility]) [E08.9]  Yes   • Coronary artery disease [I25.10]  Yes   • ANNETTE treated with BiPAP [G47.33]  Yes   • Diabetic neuropathy (CMS/[facility]) [E11.40]  Yes      Resolved Hospital Problems   No resolved problems to display.       Acute congestive heart failure: Secondary to hypertensive heart disease  -Upon arrival to the ED proBNP 10,623  -40 mg IV Lasix given in the ED, continue every 8 hours  -Cardiogram on 2021 shows EF of 35%  -Troponin level-0.06-0.047   -O2 to keep sats greater than 90%     Elevated  troponin: Upon arrival to the ED troponin 0.053  -Consulted cardiology   -EKG now and every 6 hours x 2  -Troponin level 0.05-0.06-0.04     CKD stage III:  -IV Lasix 40 mg every 12hours  -Monitor BMP  -Monitor intake and output  -Avoid nephrotoxic medication     Paroxysmal atrial fibrillation:  -Continue home medication Eliquis 5 mg p.o. daily  -Continue home medication digoxin 125 mcg p.o. daily     Diabetes mellitus:  -Sliding scale insulin  -Continue home Lantus 30 units subcu twice daily  -Consistent carbohydrate diet  -Before meals and at bedtime Accu-Cheks     Essential hypertension:  -Continue home medication hydralazine 50 mg p.o. twice daily  -Continue home medication atenolol 100 mg p.o. daily     Dyslipidemia:  -Continue home medication atorvastatin 40 mg p.o. nightly     Obesity:  -encourage dietary modifications     Coronary artery disease/history of stent/history of CABG-  -Continue ASA 81 mg p.o. daily  -Continue home medication Plavix 75 mg p.o. daily  -Continue home medication isosorbide 60 mg p.o. twice daily     COPD:  -O2 as needed to keep sats greater than 90%  -Albuterol nebulized treatment every 6 hours as needed for shortness of air        ANNETTE with BiPAP:  -O2 as needed to keep sats greater than 90%  -May use home CPAP nightly as needed     Immobility syndrome:  -Continue home medication hydrocodone-acetaminophen 7.5-325 mg p.o. twice daily as needed for pain  -Inspect verified     History of CVA/right hemiaplasia:  -CVA in November 2020     Diabetic neuropathy:  -Continue home medication Cymbalta 60 mg p.o. daily     Depression:  -Continue home medication Cymbalta 60 mg p.o. daily     Unspecified dementia:  -Continue home medication Aricept 10 mg p.o. nightly      Brief Synopsis of Hospital Course/HPI  Mr. Mclean is a 70 y.o. male w/PMH of CHF, CKD, DM, HTN, HLD, CAD, COPD, A. fib, DM, ANNETTE, depression, immobility syndrome, CVA, neuropathy, dementia who presents to [facility] ED  due to dyspnea.  Patient has dementia and is a poor historian, unable to complete review of systems at this time.        Upon arrival to the ED vitals temp 98.2, HR 62, RR 18, /77, O2 sats 97% on 3 L nasal cannula.  Labs notable for troponin 0.053, proBNP 10,623, glucose 138, , K4.6, CO2 30, BUN 38, creatinine 1.5, ALT 5, AST 8, mag 1.9, WBC 6.7, Hgb 8.8, platelets 311, neutrophils 55.  Chest x-ray shows hazy bilateral airspace opacities which could reflect pulmonary edema or pneumonia, small bilateral pleural effusions.  EKG showed sinus rhythm rate 72, atrial premature complexes, prolonged OK interval nonspecific repolarization abnormality in diffuse leads.  Patient treated with 40 mg IV Lasix in the ED.     Date:  6/26/21: Patient seen and examined in bed no acute distress, complaining of dyspnea.  Discussed with RN.  6/27/21 patient seen and examined in bed no acute distress, no new complaints, says feels better.  Vital signs stable.  Discussed with RN.  6/28/21: Patient seen and examined in bed no acute distress, no new complaints, discussed with RN.  Dyspnea presently on 4 L of oxygen.  6/29/21 patient seen and examined in bed no acute distress.  He wants to have regular diet.  Discussed with RN.  cardiology wants to observe till tomorrow.  6/30/21 seen and examined in bed no acute distress, discussed with RN.  Awaiting pre-CERT for placement.  7/1/2021, patient seen and examined in bed no acute distress, denies any chest pain or cough.  Awaiting placement, pre-CERT pending.  Discussed with RN.  7/2/21 patient seen and examined in bed no acute distress, awaiting placement.  Discussed with RN.  Pre-CERT pending         Recommendation for Outpatient Providers:   Monitor  bnp/bmp    Reasons For Change In Medications and Indications for New Medications:  Lasix increased   Day of Discharge     Vital Signs:   Temp:  [97.4 °F (36.3 °C)-97.9 °F (36.6 °C)] 97.4 °F (36.3 °C)  Heart Rate:  [51-78] 56  Resp:   [18-20] 20  BP: (101-153)/(52-72) 101/52     Physical Exam  Vitals and nursing note reviewed.   Constitutional:       General: He is not in acute distress.     Appearance: He is well-developed. He is obese. He is not ill-appearing, toxic-appearing or diaphoretic.   HENT:      Head: Normocephalic and atraumatic.      Right Ear: Ear canal normal.      Nose: Nose normal. No congestion or rhinorrhea.      Mouth/Throat:      Mouth: Mucous membranes are moist.      Pharynx: No oropharyngeal exudate.   Eyes:      General:         Right eye: No discharge.         Left eye: No discharge.      Extraocular Movements: Extraocular movements intact.      Conjunctiva/sclera: Conjunctivae normal.      Pupils: Pupils are equal, round, and reactive to light.   Neck:      Thyroid: No thyromegaly.      Vascular: No carotid bruit or JVD.      Trachea: No tracheal deviation.   Cardiovascular:      Rate and Rhythm: Normal rate and regular rhythm.      Pulses: Normal pulses.      Heart sounds: Normal heart sounds. No murmur heard.   No friction rub. No gallop.    Pulmonary:      Effort: Pulmonary effort is normal. No respiratory distress.      Breath sounds: Normal breath sounds. No wheezing or rales.   Abdominal:      General: Bowel sounds are normal. There is no distension.      Palpations: Abdomen is soft.      Tenderness: There is no abdominal tenderness.   Musculoskeletal:         General: No swelling or tenderness. Normal range of motion.      Cervical back: Normal range of motion and neck supple. No rigidity. No muscular tenderness.   Lymphadenopathy:      Cervical: No cervical adenopathy.   Skin:     General: Skin is warm and dry.      Coloration: Skin is not jaundiced.   Neurological:      General: No focal deficit present.      Mental Status: He is alert. Mental status is at baseline.      Cranial Nerves: No cranial nerve deficit.      Motor: No abnormal muscle tone.   Psychiatric:         Mood and Affect: Mood normal.          Thought Content: Thought content normal.         Pertinent  and/or Most Recent Results     Results from last 7 days   Lab Units 07/02/21  0756 06/29/21  0320 06/27/21  1418 06/25/21 2010   WBC 10*3/mm3 6.90 6.80 7.70 6.70   HEMOGLOBIN g/dL 10.3* 8.9* 8.9* 8.8*   HEMATOCRIT % 30.5* 27.0* 27.3* 26.6*   PLATELETS 10*3/mm3 319 314 313 311   SODIUM mmol/L 142 140 142 141   POTASSIUM mmol/L 4.0 3.6 3.9 4.6   CHLORIDE mmol/L 98 97* 99 100   CO2 mmol/L 34.0* 35.0* 34.0* 30.0*   BUN mg/dL 52* 42* 39* 38*   CREATININE mg/dL 2.02* 1.82* 1.91* 1.85*   GLUCOSE mg/dL 94 109* 181* 138*   CALCIUM mg/dL 9.1 8.6 8.8 8.9     Results from last 7 days   Lab Units 06/25/21 2010   BILIRUBIN mg/dL 0.5   ALK PHOS U/L 53   ALT (SGPT) U/L 5   AST (SGOT) U/L 8           Invalid input(s): TG, LDLCALC, LDLREALC  Results from last 7 days   Lab Units 06/29/21  0320 06/27/21  1418 06/26/21  1612 06/26/21  0958 06/26/21  0129 06/25/21 2010   PROBNP pg/mL 8,456.0*  --   --   --   --  10,623.0*   TROPONIN T ng/mL  --  0.047* 0.060* 0.056* 0.061* 0.053*       Brief Urine Lab Results     None          Microbiology Results Abnormal     Procedure Component Value - Date/Time    COVID-19,CEPHEID,COR/ZEYNEP/PAD/BEATRICE IN-HOUSE(OR EMERGENT/ADD-ON),NP SWAB IN TRANSPORT MEDIA 3-4 HR TAT, RT-PCR - Swab, Nasopharynx [[id number]]  (Normal) Collected: 06/30/21 1350    Lab Status: Final result Specimen: Swab from Nasopharynx Updated: 07/01/21 0055     COVID19 Not Detected    Narrative:      Fact sheet for providers: https://www.fda.gov/media/910125/download     Fact sheet for patients: https://www.fda.gov/media/933272/download  Fact sheet for providers: https://www.fda.gov/media/656566/download     Fact sheet for patients: https://www.fda.gov/media/041775/download          XR Knee 4+ View Right    Result Date: 6/28/2021  Impression: 1.No evidence for displaced fracture or dislocation. 2.Tricompartmental arthropathy suggesting osteoarthritis. 3.Evidence for multiple  osteochondromas associated with the proximal right tibia. Similar findings are seen on the left on the prior x-rays. The findings suggest multiple hereditary exostoses.  Electronically Signed By-Yony Thomason MD On:6/28/2021 3:05 PM This report was finalized on [id number] by  Yony Thomason MD.    XR Chest 1 View    Result Date: 6/25/2021  Impression: 1.Hazy bilateral airspace opacities, which could reflect pulmonary edema or pneumonia. 2.Small bilateral pleural effusions.  Electronically Signed By-Yony Flores MD On:6/25/2021 8:24 PM This report was finalized on [id number] by  Yony Flores MD.              Results for orders placed during the hospital encounter of 05/06/21    Adult Transthoracic Echo Complete w/ Color, Spectral and Contrast if necessary per protocol    Interpretation Summary  Technically difficult study due to poor acoustic windows, not all left ventricular walls are well visualized therefore Lumason contrast was given to assess LV function..  LVE with basal inferior wall hypokinesis and septal dyskinesis, estimated LV ejection fraction of 35%  Normal RV size  Biatrial enlargement seen.  Aortic valve, mitral valve, tricuspid valve appears structurally normal, no significant regurgitation seen.  No pericardial effusion seen.  Proximal aorta appears normal in size.              Test Results Pending at Discharge        Procedures Performed           Consults:   Consults     Date and Time Order Name Status Description    6/26/2021  7:49 AM Inpatient Cardiology Consult      6/25/2021  9:24 PM Hospitalist (on-call MD unless specified) Completed     5/21/2021  4:05 PM Inpatient Nephrology Consult Completed     5/19/2021  1:03 AM Inpatient Cardiology Consult Completed     5/18/2021 11:48 PM Hospitalist (on-call MD unless specified) Completed         Assessment/Plan         Principal Problem:    Acute on chronic congestive heart failure (CMS/HCC)  Active Problems:    Diabetes mellitus due  to underlying condition without complication, without long-term current use of insulin (CMS/[facility])    S/P CABG (coronary artery bypass graft)    Essential hypertension    Dyslipidemia    Elevated troponin    ANNETTE treated with BiPAP    Major depressive disorder, recurrent, mild (CMS/[facility])    Immobility syndrome    History of cerebrovascular accident    Diabetic neuropathy (CMS/[facility])    Depressive disorder    Unspecified dementia with behavioral disturbance (CMS/[facility])    Coronary artery disease    Chronic obstructive pulmonary disease, unspecified (CMS/[facility])    Paroxysmal atrial fibrillation (CMS/[facility])    Acute respiratory distress    CKD (chronic kidney disease) stage 3, GFR 30-59 ml/min (CMS/[facility])  70-year-old male patient of Dr. Rm who was recently discharged 6/24/2021 for admission for congestive heart failure. He is a resident of [facility] who has a past history of atrial fibrillation paroxysmal, stroke ([facility]), and congestive heart failure with severe LV systolic dysfunction (most recent ejection fraction 35%). He also has known CAD status post coronary artery bypass grafting in 2005. His most recent cardiac catheterization from 5/24/2021, the images which I personally reviewed, showed severe lesion in native LAD which underwent stent placement and also the distal SVG to RCA which underwent PCI and stenting. I also reviewed his most recent echocardiogram from 5/27/2021 which showed persistent severe LV systolic dysfunction with biatrial enlargement left greater than right and no significant valvular heart disease.     He presents once again with shortness of breath increasing lower extremity edema with New York Heart Association class III-IV symptoms with shortness of breath at rest. He has no associated chest pain no other symptoms from a cardiovascular standpoint.     His laboratory data I reviewed, which showed an elevated troponin that peaked at 0.061 but is trending down today at 0.056. The patient has no  chest pain. He has on revascularized chronic total occlusion of circumflex artery with diagonal and OM on revascularized receiving collateralization from left coronary artery. His BNP was greater than 10,000, indicating most likely demand related ischemic event due to acute decompensated congestive heart failure. This in the setting of CKD class IV with his current creatinine 1.85 g/dL increased from 1.60 mg/dL.     [[[[[[[[[[[[[[[[[[[[[[[[[    Impression  ==============  -Acute on chronic congestive heart failure.  Recent echocardiogram showed left ventricle dysfunction with ejection fraction of 35%.     -History of right-sided weakness with left MCA territory stroke.-Improved      -status post CABG 2005. status post stent to LAD 2009    Status post stent to LAD 11/13/2015  Status post stent to mid LAD and SVG to marginal branch 09/16/2016.  Status post stent to mid LAD 5/24/2021  Status post stent to SVG to RCA 5/26/2021     Cardiac catheterization 5/24/2021 revealed  Left ventricle angiogram was not performed due to pre-existing renal dysfunction.  Left main coronary artery normal.  Left anterior descending artery has mid segment lengthy 90% disease within the previously placed stent.  Distal left into descending artery has diffuse disease.  Circumflex coronary artery is totally occluded.  (Chronic)  Right coronary artery is chronically occluded.  SVG to marginal branch (jump graft to OM1 and OM 2) is totally occluded (new finding compared to 2015.  SVG to PDA has distal radiolucency.  However no definite obstructive disease is present.       -status post myocardial infarction 2000 and 2002 .      -history of intermittent atrial fibrillation-maintaining sinus rhythm     Transesophageal echocardiogram 11/30/2020.  Biatrial enlargement.  Smoking effect in the left atrium and left ventricle and left atrial appendage.  Mild mitral and aortic regurgitation.  Left ventricle enlargement with diffuse hypocontractility  with ejection fraction of 35 to 40%.     Echocardiogram 11/27/2020 revealed left atrial enlargement left ventricle dysfunction with ejection fraction of 40%.  Negative bubble study.      -diabetes hypertension and sleep apnea in history of renal dysfunction .  Recent BUN/creatinine 38/1.36     -status post colon surgery (partial colectomy) appendectomy cholecystectomy right 4th toe removal and carpal tunnel surgery      -continued smoking 1 pack per day -abstinence from smoking      -allergy to codeine.  ============   Plan  ================  Status post CABG  Patient is not having any angina pectoris     Acute on chronic systolic congestive heart failure.  Intravenous diuresis  Close observation of renal function.     History ofSpontaneous prolonged episode of asystole is of concern.  Patient had recovered without any intervention or CPR.  We will closely observe for any bradycardia arrhythmias and patient may need pacemaker/ICD implantation.  No further episodes.     Status post stent to LAD 5/24/2021.  Status post stent to SVG to RCA 5/26/2021.     Paroxysmal atrial fibrillation.  Patient is mostly in sinus rhythm.     Renal dysfunction stable  Latest BUN/creatinine 43/1.6-6/2/2021  39/1.9-6/28/2021  42/1,82-6/29/2021     Anemia  Latest hemoglobin  8.4-5/28/2021  Hemoglobin 8.9-6/28/2021     Anticoagulation  Patient is on Eliquis.     Medications were reviewed and updated.  Current medications include Eliquis aspirin atenolol atorvastatin Plavix Lanoxin Lasix 40 mg IV every 8 hours hydralazine 50 mg p.o. every 12 hours Imdur 60 mg a day insulin.     Okay with discharge plans.  Follow-up in the office at her regularly scheduled appointment.     Further plan will depend on patient's progress.   ]]]]]]]]]]]]]]]]]]]]]]              Jose G Rm MD  06/30/21  06:25 EDT             Discharge Details        Discharge Medications      New Medications      Instructions Start Date   nitroglycerin 0.4 MG SL  tablet  Commonly known as: NITROSTAT   0.4 mg, Sublingual, Every 5 Minutes PRN, Take no more than 3 doses in 15 minutes.         Changes to Medications      Instructions Start Date   furosemide 40 MG tablet  Commonly known as: LASIX  What changed: when to take this   40 mg, Oral, 2 Times Daily      ondansetron 4 MG tablet  Commonly known as: ZOFRAN  What changed: Another medication with the same name was added. Make sure you understand how and when to take each.   4 mg, Oral, Every 6 Hours PRN      ondansetron 4 MG tablet  Commonly known as: ZOFRAN  What changed: You were already taking a medication with the same name, and this prescription was added. Make sure you understand how and when to take each.   4 mg, Oral, Every 6 Hours PRN         Continue These Medications      Instructions Start Date   albuterol (5 MG/ML) 0.5% nebulizer solution  Commonly known as: PROVENTIL   2.5 mg, Nebulization, 4 Times Daily - RT      albuterol sulfate  (90 Base) MCG/ACT inhaler  Commonly known as: PROVENTIL HFA;VENTOLIN HFA;PROAIR HFA   1 puff, Inhalation, Every 6 Hours PRN      apixaban 5 MG tablet tablet  Commonly known as: ELIQUIS   5 mg, Oral, Daily      aspirin 81 MG chewable tablet   81 mg, Oral, Daily      atenolol 100 MG tablet  Commonly known as: TENORMIN   100 mg, Oral, Daily, Hold SBP < 100 or HR < 60      atorvastatin 40 MG tablet  Commonly known as: LIPITOR   40 mg, Oral, Nightly      cholecalciferol 25 MCG (1000 UT) tablet  Commonly known as: VITAMIN D3   1,000 Units, Oral, Daily      clopidogrel 75 MG tablet  Commonly known as: PLAVIX   75 mg, Oral, Daily      dextrose 40 % gel  Commonly known as: GLUTOSE   Oral, Every 1 Hour PRN      digoxin 125 MCG tablet  Commonly known as: LANOXIN   125 mcg, Oral, Daily Digoxin, In the afternoon      donepezil 10 MG tablet  Commonly known as: ARICEPT   10 mg, Oral, Nightly      DULoxetine 60 MG capsule  Commonly known as: CYMBALTA   60 mg, Oral, Daily      fish oil 1000 MG  capsule capsule   1,000 mg, Oral, Daily      gabapentin 100 MG capsule  Commonly known as: NEURONTIN   200 mg, Oral, 2 times daily      Glucagon Emergency 1 MG kit   1 mg, Injection, Once As Needed      hydrALAZINE 50 MG tablet  Commonly known as: APRESOLINE   50 mg, Oral, 2 times daily, Hold for SBP <110       HYDROcodone-acetaminophen 7.5-325 MG per tablet  Commonly known as: NORCO   1 tablet, Oral, Every 12 Hours PRN      insulin NPH-insulin regular (70-30) 100 UNIT/ML injection  Commonly known as: humuLIN 70/30,novoLIN 70/30   30 Units, Subcutaneous, 2 Times Daily With Meals      ipratropium-albuterol 0.5-2.5 mg/3 ml nebulizer  Commonly known as: DUO-NEB   3 mL, Nebulization, Every 4 Hours PRN      isosorbide mononitrate 60 MG 24 hr tablet  Commonly known as: IMDUR   60 mg, Oral, 2 Times Daily      tiotropium 18 MCG per inhalation capsule  Commonly known as: Spiriva HandiHaler   1 capsule, Inhalation, Daily - RT      vitamin B-12 1000 MCG tablet  Commonly known as: CYANOCOBALAMIN   1,000 mcg, Oral, Daily         Stop These Medications    metFORMIN  MG 24 hr tablet  Commonly known as: GLUCOPHAGE-XR            Allergies   Allergen Reactions   • Codeine Itching         Discharge Disposition:  Skilled Nursing Facility (DC - External)    Diet:  Hospital:  Diet Order   Procedures   • Diet Diabetic/Consistent Carbs; Diabetic - Consistent Carb         Discharge Activity:   Activity Instructions     Gradually Increase Activity Until at Pre-Hospitalization Level              CODE STATUS:    Code Status and Medical Interventions:   Ordered at: 06/25/21 1133     Level Of Support Discussed With:    Patient     Code Status:    CPR     Medical Interventions (Level of Support Prior to Arrest):    Full         Follow-up Appointments  Future Appointments   Date Time Provider Department Center   9/16/2021  1:00 PM HERI Chung DPM [facility]   1/3/2022  2:50 PM Jose G Rm MD [facility]              Condition on Discharge:      Stable      This patient has been examined wearing appropriate Personal Protective Equipment and discussed with rn. 07/02/21      Electronically signed by Marquez Mcfarlane MD, 07/02/21, 4:59 PM EDT.      Time: I spent  45  minutes on this discharge activity which included face-to-face encounter with the patient/reviewing the data in the system/coordination of the care with the nursing staff as well as consultants/documentation/entering orders.

## 2021-07-02 NOTE — THERAPY TREATMENT NOTE
Subjective: Pt agreeable to therapeutic plan of care.    Objective:     Bed mobility - Mod-A  Supine to sit   Transfers - Mod-A and with rolling walker  Ambulation - 23 feet Mod-A and with rolling walker   Therapeutic exercises - ap's, heel slides, hip abduction, quad sets, glute sets, laq's /10 reps    Pain: 5 VAS right knee  Education: Provided education on importance of mobility and skilled verbal / tactile cueing throughout intervention.     Assessment: Benson Mclean presents with functional mobility impairments which indicate the need for skilled intervention. Tolerating session today without incident. Good participation today.  Pt was able to progress to gait training.  Will continue to follow and progress as tolerated.     Plan/Recommendations:   Pt would benefit from Inpatient Rehabilitation placement at discharge from facility   Pt desires Inpatient Rehabilitation placement at discharge. Pt cooperative; agreeable to therapeutic recommendations and plan of care.     Basic Mobility 6-click:  Rollin = Total, A lot = 2, A little = 3; 4 = None  Supine>Sit:   1 = Total, A lot = 2, A little = 3; 4 = None   Sit>Stand with arms:  1 = Total, A lot = 2, A little = 3; 4 = None  Bed>Chair:   1 = Total, A lot = 2, A little = 3; 4 = None  Ambulate in room:  1 = Total, A lot = 2, A little = 3; 4 = None  3-5 Steps with railin = Total, A lot = 2, A little = 3; 4 = None  Score: 12    Modified [place]: 4 = Moderately severe disability (Unable to attend to own bodily needs without assistance, and unable to walk unassisted)     Post-Tx Position: Up in Chair, Alarms activated and Call light and personal items within reach   PPE: gloves, surgical mask, eyewear protection

## 2021-07-02 NOTE — PLAN OF CARE
Assessment: Besnon Mclean presents with ADL impairments below baseline abilities which indicate the need for continued skilled intervention while inpatient. Demos significant LE weakness and requires mod/max assist for transfers. Tolerating session today without incident. Will continue to follow and progress as tolerated.     Plan/Recommendations:   Pt would benefit from Inpatient Rehabilitation placement at discharge from facility.   Pt desires Inpatient Rehabilitation placement at discharge. Pt cooperative; agreeable to therapeutic recommendations and plan of care.

## 2021-07-02 NOTE — NURSING NOTE
Placed a call to patient's wife, Melanie to inform her that patient is being D/C back to facility  1818-Called wife to let her know that the ambulance will be here around 9:30p

## 2021-07-02 NOTE — NURSING NOTE
Called report to Terry; couldn't reach nurse. Will attempt again.  1811-called again, spoke with Tiffanie

## 2021-07-02 NOTE — PLAN OF CARE
Goal Outcome Evaluation:        Bed mobility - Mod-A  Supine to sit   Transfers - Mod-A and with rolling walker  Ambulation - 23 feet Mod-A and with rolling walker   Therapeutic exercises - ap's, heel slides, hip abduction, quad sets, glute sets, laq's 1/10 reps    Pt would benefit from Inpatient Rehabilitation placement at discharge from facility   Pt desires Inpatient Rehabilitation placement at discharge. Pt cooperative; agreeable to therapeutic recommendations and plan of care.

## 2021-07-02 NOTE — THERAPY TREATMENT NOTE
Subjective: Pt agreeable to therapeutic plan of care.    Objective:     Bed Mobility: N/A or Not attempted.  Functional Transfers: Supervision, Mod-A, Max-A and Assist x 2  Functional Ambulation: CGA    Toileting: CGA and with adaptive equipment  ADL Position: supported sitting  ADL Comments: Using urinal       Pain: 0 VAS  Education: Provided education on importance of mobility and skilled verbal / tactile cueing throughout intervention.     Assessment: Benson Mclean presents with ADL impairments below baseline abilities which indicate the need for continued skilled intervention while inpatient. Tolerating session today without incident. Will continue to follow and progress as tolerated.     Plan/Recommendations:   Pt would benefit from Inpatient Rehabilitation placement at discharge from facility.   Pt desires Inpatient Rehabilitation placement at discharge. Pt cooperative; agreeable to therapeutic recommendations and plan of care.     Modified [place]: 4 = Moderately severe disability (Unable to attend to own bodily needs without assistance, and unable to walk unassisted)     Post-Tx Position: Up in Chair, Alarms activated and Call light and personal items within reach  PPE: gloves, surgical mask, eyewear protection

## 2021-07-02 NOTE — CASE MANAGEMENT/SOCIAL WORK
Discharge Planning Assessment   [place]     Patient Name: Benson Mclean  MRN: [id number]  Today's Date: 7/2/2021    Admit Date: 6/25/2021          Plan    Plan Comments  per liason with jose [facility]b is obtained and patient is discharging to rehab       Carol naegele rn  Case management  Office number [phone]  Cell phone [phone]

## 2021-07-03 NOTE — NURSING NOTE
Pt discharged with EMS to [facility].  IV removed.  Personal items packed and sent wiht pt including cpap machine,phone and , bag that was in closet that stated send back to facility with patient.

## 2021-07-04 NOTE — CASE MANAGEMENT/SOCIAL WORK
Case Management Discharge Note      Final Note: [place]         Selected Continued Care - Discharged on 7/2/2021 Admission date: 6/25/2021 - Discharge disposition: Skilled Nursing Facility (DC - External)    Destination Coordination complete    Service Provider Selected Services Address Phone Fax Patient Preferred    [facility] [address] [facility] [place] [address] [phone] --         Final Discharge Disposition Code: 03 - skilled nursing facility (SNF)

## 2021-07-20 ENCOUNTER — APPOINTMENT (OUTPATIENT)
Dept: GENERAL RADIOLOGY | Facility: HOSPITAL | Age: 71
End: 2021-07-20

## 2021-07-20 ENCOUNTER — HOSPITAL ENCOUNTER (INPATIENT)
Facility: HOSPITAL | Age: 71
LOS: 10 days | Discharge: NURSING, REHAB OR LONG-TERM CARE | End: 2021-08-02
Attending: INTERNAL MEDICINE | Admitting: INTERNAL MEDICINE

## 2021-07-20 DIAGNOSIS — R07.9 CHEST PAIN, UNSPECIFIED TYPE: ICD-10-CM

## 2021-07-20 DIAGNOSIS — R77.8 ELEVATED TROPONIN: ICD-10-CM

## 2021-07-20 DIAGNOSIS — I50.9 ACUTE ON CHRONIC CONGESTIVE HEART FAILURE, UNSPECIFIED HEART FAILURE TYPE (HCC): Primary | ICD-10-CM

## 2021-07-20 LAB
ALBUMIN SERPL-MCNC: 3.5 G/DL (ref 3.5–5.2)
ALBUMIN/GLOB SERPL: 0.9 G/DL
ALP SERPL-CCNC: 48 U/L (ref 39–117)
ALT SERPL W P-5'-P-CCNC: <5 U/L (ref 1–41)
ANION GAP SERPL CALCULATED.3IONS-SCNC: 13 MMOL/L (ref 5–15)
APTT PPP: 20.5 SECONDS (ref 24–31)
AST SERPL-CCNC: 7 U/L (ref 1–40)
BILIRUB SERPL-MCNC: 0.6 MG/DL (ref 0–1.2)
BUN SERPL-MCNC: 31 MG/DL (ref 8–23)
BUN/CREAT SERPL: 15.8 (ref 7–25)
CALCIUM SPEC-SCNC: 8.8 MG/DL (ref 8.6–10.5)
CHLORIDE SERPL-SCNC: 100 MMOL/L (ref 98–107)
CO2 SERPL-SCNC: 28 MMOL/L (ref 22–29)
CREAT SERPL-MCNC: 1.96 MG/DL (ref 0.76–1.27)
DEPRECATED RDW RBC AUTO: 47.3 FL (ref 37–54)
DIGOXIN SERPL-MCNC: 1 NG/ML (ref 0.6–1.2)
EOSINOPHIL # BLD MANUAL: 0.38 10*3/MM3 (ref 0–0.4)
EOSINOPHIL NFR BLD MANUAL: 5 % (ref 0.3–6.2)
ERYTHROCYTE [DISTWIDTH] IN BLOOD BY AUTOMATED COUNT: 15.3 % (ref 12.3–15.4)
GFR SERPL CREATININE-BSD FRML MDRD: 34 ML/MIN/1.73
GLOBULIN UR ELPH-MCNC: 3.7 GM/DL
GLUCOSE SERPL-MCNC: 95 MG/DL (ref 65–99)
HCT VFR BLD AUTO: 26.2 % (ref 37.5–51)
HGB BLD-MCNC: 8.7 G/DL (ref 13–17.7)
HOLD SPECIMEN: NORMAL
HOLD SPECIMEN: NORMAL
INR PPP: 1.03 (ref 0.93–1.1)
LYMPHOCYTES # BLD MANUAL: 2.05 10*3/MM3 (ref 0.7–3.1)
LYMPHOCYTES NFR BLD MANUAL: 27 % (ref 19.6–45.3)
LYMPHOCYTES NFR BLD MANUAL: 7 % (ref 5–12)
MAGNESIUM SERPL-MCNC: 2.1 MG/DL (ref 1.6–2.4)
MCH RBC QN AUTO: 28.9 PG (ref 26.6–33)
MCHC RBC AUTO-ENTMCNC: 33.1 G/DL (ref 31.5–35.7)
MCV RBC AUTO: 87.3 FL (ref 79–97)
MONOCYTES # BLD AUTO: 0.53 10*3/MM3 (ref 0.1–0.9)
NEUTROPHILS # BLD AUTO: 4.64 10*3/MM3 (ref 1.7–7)
NEUTROPHILS NFR BLD MANUAL: 59 % (ref 42.7–76)
NEUTS BAND NFR BLD MANUAL: 2 % (ref 0–5)
NT-PROBNP SERPL-MCNC: ABNORMAL PG/ML (ref 0–900)
PLAT MORPH BLD: NORMAL
PLATELET # BLD AUTO: 347 10*3/MM3 (ref 140–450)
PMV BLD AUTO: 8.3 FL (ref 6–12)
POTASSIUM SERPL-SCNC: 4 MMOL/L (ref 3.5–5.2)
PROT SERPL-MCNC: 7.2 G/DL (ref 6–8.5)
PROTHROMBIN TIME: 11.4 SECONDS (ref 9.6–11.7)
RBC # BLD AUTO: 3.01 10*6/MM3 (ref 4.14–5.8)
RBC MORPH BLD: NORMAL
SCAN SLIDE: NORMAL
SODIUM SERPL-SCNC: 141 MMOL/L (ref 136–145)
TROPONIN T SERPL-MCNC: 0.14 NG/ML (ref 0–0.03)
TSH SERPL DL<=0.05 MIU/L-ACNC: 3.3 UIU/ML (ref 0.27–4.2)
WBC # BLD AUTO: 7.6 10*3/MM3 (ref 3.4–10.8)
WBC MORPH BLD: NORMAL
WHOLE BLOOD HOLD SPECIMEN: NORMAL

## 2021-07-20 PROCEDURE — 93005 ELECTROCARDIOGRAM TRACING: CPT

## 2021-07-20 PROCEDURE — 85730 THROMBOPLASTIN TIME PARTIAL: CPT | Performed by: PHYSICIAN ASSISTANT

## 2021-07-20 PROCEDURE — 84484 ASSAY OF TROPONIN QUANT: CPT | Performed by: PHYSICIAN ASSISTANT

## 2021-07-20 PROCEDURE — 85610 PROTHROMBIN TIME: CPT | Performed by: PHYSICIAN ASSISTANT

## 2021-07-20 PROCEDURE — 80053 COMPREHEN METABOLIC PANEL: CPT | Performed by: PHYSICIAN ASSISTANT

## 2021-07-20 PROCEDURE — 84443 ASSAY THYROID STIM HORMONE: CPT | Performed by: PHYSICIAN ASSISTANT

## 2021-07-20 PROCEDURE — 71045 X-RAY EXAM CHEST 1 VIEW: CPT

## 2021-07-20 PROCEDURE — 93005 ELECTROCARDIOGRAM TRACING: CPT | Performed by: INTERNAL MEDICINE

## 2021-07-20 PROCEDURE — 83880 ASSAY OF NATRIURETIC PEPTIDE: CPT | Performed by: PHYSICIAN ASSISTANT

## 2021-07-20 PROCEDURE — 85007 BL SMEAR W/DIFF WBC COUNT: CPT | Performed by: PHYSICIAN ASSISTANT

## 2021-07-20 PROCEDURE — 99285 EMERGENCY DEPT VISIT HI MDM: CPT

## 2021-07-20 PROCEDURE — 99220 PR INITIAL OBSERVATION CARE/DAY 70 MINUTES: CPT | Performed by: INTERNAL MEDICINE

## 2021-07-20 PROCEDURE — 83735 ASSAY OF MAGNESIUM: CPT | Performed by: PHYSICIAN ASSISTANT

## 2021-07-20 PROCEDURE — 80162 ASSAY OF DIGOXIN TOTAL: CPT | Performed by: PHYSICIAN ASSISTANT

## 2021-07-20 PROCEDURE — 85025 COMPLETE CBC W/AUTO DIFF WBC: CPT | Performed by: PHYSICIAN ASSISTANT

## 2021-07-20 RX ORDER — ASPIRIN 81 MG/1
324 TABLET, CHEWABLE ORAL ONCE
Status: COMPLETED | OUTPATIENT
Start: 2021-07-20 | End: 2021-07-20

## 2021-07-20 RX ORDER — FUROSEMIDE 10 MG/ML
40 INJECTION INTRAMUSCULAR; INTRAVENOUS ONCE
Status: COMPLETED | OUTPATIENT
Start: 2021-07-20 | End: 2021-07-21

## 2021-07-20 RX ORDER — DILTIAZEM HYDROCHLORIDE 5 MG/ML
10 INJECTION INTRAVENOUS ONCE
Status: COMPLETED | OUTPATIENT
Start: 2021-07-20 | End: 2021-07-20

## 2021-07-20 RX ORDER — SODIUM CHLORIDE 0.9 % (FLUSH) 0.9 %
10 SYRINGE (ML) INJECTION AS NEEDED
Status: DISCONTINUED | OUTPATIENT
Start: 2021-07-20 | End: 2021-08-03 | Stop reason: HOSPADM

## 2021-07-20 RX ADMIN — DILTIAZEM HYDROCHLORIDE 10 MG: 5 INJECTION INTRAVENOUS at 22:10

## 2021-07-20 RX ADMIN — ASPIRIN 324 MG: 81 TABLET, CHEWABLE ORAL at 22:07

## 2021-07-21 ENCOUNTER — APPOINTMENT (OUTPATIENT)
Dept: CARDIOLOGY | Facility: HOSPITAL | Age: 71
End: 2021-07-21

## 2021-07-21 PROBLEM — F17.200 TOBACCO USE DISORDER: Status: ACTIVE | Noted: 2021-07-21

## 2021-07-21 LAB
ANION GAP SERPL CALCULATED.3IONS-SCNC: 11 MMOL/L (ref 5–15)
BASOPHILS # BLD AUTO: 0.1 10*3/MM3 (ref 0–0.2)
BASOPHILS NFR BLD AUTO: 2 % (ref 0–1.5)
BH CV ECHO MEAS - % IVS THICK: 12.9 %
BH CV ECHO MEAS - % LVPW THICK: 0.17 %
BH CV ECHO MEAS - ACS: 1.8 CM
BH CV ECHO MEAS - AO MAX PG (FULL): -0.81 MMHG
BH CV ECHO MEAS - AO MAX PG: 1.9 MMHG
BH CV ECHO MEAS - AO MEAN PG (FULL): -0.17 MMHG
BH CV ECHO MEAS - AO MEAN PG: 1 MMHG
BH CV ECHO MEAS - AO ROOT AREA (BSA CORRECTED): 1.3
BH CV ECHO MEAS - AO ROOT AREA: 6.5 CM^2
BH CV ECHO MEAS - AO ROOT DIAM: 2.9 CM
BH CV ECHO MEAS - AO V2 MAX: 69.7 CM/SEC
BH CV ECHO MEAS - AO V2 MEAN: 48.5 CM/SEC
BH CV ECHO MEAS - AO V2 VTI: 14.6 CM
BH CV ECHO MEAS - AVA(I,A): 3 CM^2
BH CV ECHO MEAS - AVA(I,D): 3 CM^2
BH CV ECHO MEAS - AVA(V,A): 3.2 CM^2
BH CV ECHO MEAS - AVA(V,D): 3.2 CM^2
BH CV ECHO MEAS - BSA(HAYCOCK): 2.4 M^2
BH CV ECHO MEAS - BSA: 2.3 M^2
BH CV ECHO MEAS - BZI_BMI: 36.2 KILOGRAMS/M^2
BH CV ECHO MEAS - BZI_METRIC_HEIGHT: 177.8 CM
BH CV ECHO MEAS - BZI_METRIC_WEIGHT: 114.3 KG
BH CV ECHO MEAS - EDV(CUBED): 249.4 ML
BH CV ECHO MEAS - EDV(MOD-SP4): 115.7 ML
BH CV ECHO MEAS - EDV(TEICH): 200.8 ML
BH CV ECHO MEAS - EF(CUBED): 31.6 %
BH CV ECHO MEAS - EF(MOD-BP): 31 %
BH CV ECHO MEAS - EF(MOD-SP4): 30.7 %
BH CV ECHO MEAS - EF(TEICH): 25.1 %
BH CV ECHO MEAS - ESV(CUBED): 170.7 ML
BH CV ECHO MEAS - ESV(MOD-SP4): 80.2 ML
BH CV ECHO MEAS - ESV(TEICH): 150.4 ML
BH CV ECHO MEAS - FS: 11.9 %
BH CV ECHO MEAS - IVS/LVPW: 1
BH CV ECHO MEAS - IVSD: 1.2 CM
BH CV ECHO MEAS - IVSS: 1.4 CM
BH CV ECHO MEAS - LA DIMENSION(2D): 4.9 CM
BH CV ECHO MEAS - LV DIASTOLIC VOL/BSA (35-75): 50.2 ML/M^2
BH CV ECHO MEAS - LV MASS(C)D: 329.4 GRAMS
BH CV ECHO MEAS - LV MASS(C)DI: 143.1 GRAMS/M^2
BH CV ECHO MEAS - LV MASS(C)S: 292.1 GRAMS
BH CV ECHO MEAS - LV MASS(C)SI: 126.9 GRAMS/M^2
BH CV ECHO MEAS - LV MAX PG: 2.8 MMHG
BH CV ECHO MEAS - LV MEAN PG: 1.2 MMHG
BH CV ECHO MEAS - LV SYSTOLIC VOL/BSA (12-30): 34.8 ML/M^2
BH CV ECHO MEAS - LV V1 MAX: 83 CM/SEC
BH CV ECHO MEAS - LV V1 MEAN: 50.4 CM/SEC
BH CV ECHO MEAS - LV V1 VTI: 16.4 CM
BH CV ECHO MEAS - LVIDD: 6.3 CM
BH CV ECHO MEAS - LVIDS: 5.5 CM
BH CV ECHO MEAS - LVOT AREA: 2.7 CM^2
BH CV ECHO MEAS - LVOT DIAM: 1.9 CM
BH CV ECHO MEAS - LVPWD: 1.1 CM
BH CV ECHO MEAS - LVPWS: 1.1 CM
BH CV ECHO MEAS - MV A MAX VEL: 38.5 CM/SEC
BH CV ECHO MEAS - MV DEC SLOPE: 648.7 CM/SEC^2
BH CV ECHO MEAS - MV DEC TIME: 0.16 SEC
BH CV ECHO MEAS - MV E MAX VEL: 100.6 CM/SEC
BH CV ECHO MEAS - MV E/A: 2.6
BH CV ECHO MEAS - MV MAX PG: 6.8 MMHG
BH CV ECHO MEAS - MV MEAN PG: 1.8 MMHG
BH CV ECHO MEAS - MV V2 MAX: 130.4 CM/SEC
BH CV ECHO MEAS - MV V2 MEAN: 60.7 CM/SEC
BH CV ECHO MEAS - MV V2 VTI: 28.4 CM
BH CV ECHO MEAS - MVA(VTI): 1.6 CM^2
BH CV ECHO MEAS - PA ACC TIME: 0.08 SEC
BH CV ECHO MEAS - PA MAX PG (FULL): 1.3 MMHG
BH CV ECHO MEAS - PA MAX PG: 3.3 MMHG
BH CV ECHO MEAS - PA MEAN PG (FULL): 0.71 MMHG
BH CV ECHO MEAS - PA MEAN PG: 1.7 MMHG
BH CV ECHO MEAS - PA PR(ACCEL): 44.7 MMHG
BH CV ECHO MEAS - PA V2 MAX: 91.3 CM/SEC
BH CV ECHO MEAS - PA V2 MEAN: 62 CM/SEC
BH CV ECHO MEAS - PA V2 VTI: 15 CM
BH CV ECHO MEAS - RAP SYSTOLE: 15 MMHG
BH CV ECHO MEAS - RV MAX PG: 2.1 MMHG
BH CV ECHO MEAS - RV MEAN PG: 1 MMHG
BH CV ECHO MEAS - RV V1 MAX: 72.1 CM/SEC
BH CV ECHO MEAS - RV V1 MEAN: 46.9 CM/SEC
BH CV ECHO MEAS - RV V1 VTI: 14.3 CM
BH CV ECHO MEAS - RVDD: 2.2 CM
BH CV ECHO MEAS - RVSP: 31.8 MMHG
BH CV ECHO MEAS - SI(AO): 41.4 ML/M^2
BH CV ECHO MEAS - SI(CUBED): 34.2 ML/M^2
BH CV ECHO MEAS - SI(LVOT): 19.2 ML/M^2
BH CV ECHO MEAS - SI(MOD-SP4): 15.4 ML/M^2
BH CV ECHO MEAS - SI(TEICH): 21.9 ML/M^2
BH CV ECHO MEAS - SV(AO): 95.4 ML
BH CV ECHO MEAS - SV(CUBED): 78.7 ML
BH CV ECHO MEAS - SV(LVOT): 44.3 ML
BH CV ECHO MEAS - SV(MOD-SP4): 35.5 ML
BH CV ECHO MEAS - SV(TEICH): 50.5 ML
BH CV ECHO MEAS - TR MAX VEL: 205.2 CM/SEC
BUN SERPL-MCNC: 32 MG/DL (ref 8–23)
BUN/CREAT SERPL: 17 (ref 7–25)
CALCIUM SPEC-SCNC: 8.7 MG/DL (ref 8.6–10.5)
CHLORIDE SERPL-SCNC: 101 MMOL/L (ref 98–107)
CO2 SERPL-SCNC: 31 MMOL/L (ref 22–29)
CREAT SERPL-MCNC: 1.88 MG/DL (ref 0.76–1.27)
DEPRECATED RDW RBC AUTO: 47.7 FL (ref 37–54)
EOSINOPHIL # BLD AUTO: 0.8 10*3/MM3 (ref 0–0.4)
EOSINOPHIL NFR BLD AUTO: 11.6 % (ref 0.3–6.2)
ERYTHROCYTE [DISTWIDTH] IN BLOOD BY AUTOMATED COUNT: 15.4 % (ref 12.3–15.4)
GFR SERPL CREATININE-BSD FRML MDRD: 36 ML/MIN/1.73
GLUCOSE BLDC GLUCOMTR-MCNC: 115 MG/DL (ref 70–105)
GLUCOSE BLDC GLUCOMTR-MCNC: 115 MG/DL (ref 70–105)
GLUCOSE BLDC GLUCOMTR-MCNC: 118 MG/DL (ref 70–105)
GLUCOSE BLDC GLUCOMTR-MCNC: 155 MG/DL (ref 70–105)
GLUCOSE BLDC GLUCOMTR-MCNC: 63 MG/DL (ref 70–105)
GLUCOSE BLDC GLUCOMTR-MCNC: 72 MG/DL (ref 70–105)
GLUCOSE BLDC GLUCOMTR-MCNC: 74 MG/DL (ref 70–105)
GLUCOSE SERPL-MCNC: 133 MG/DL (ref 65–99)
HBA1C MFR BLD: 7.4 % (ref 3.5–5.6)
HCT VFR BLD AUTO: 27 % (ref 37.5–51)
HGB BLD-MCNC: 8.7 G/DL (ref 13–17.7)
LV EF 2D ECHO EST: 30 %
LYMPHOCYTES # BLD AUTO: 2.1 10*3/MM3 (ref 0.7–3.1)
LYMPHOCYTES NFR BLD AUTO: 31.3 % (ref 19.6–45.3)
MAXIMAL PREDICTED HEART RATE: 150 BPM
MCH RBC QN AUTO: 28.2 PG (ref 26.6–33)
MCHC RBC AUTO-ENTMCNC: 32.1 G/DL (ref 31.5–35.7)
MCV RBC AUTO: 88 FL (ref 79–97)
MONOCYTES # BLD AUTO: 0.8 10*3/MM3 (ref 0.1–0.9)
MONOCYTES NFR BLD AUTO: 11.4 % (ref 5–12)
NEUTROPHILS NFR BLD AUTO: 3 10*3/MM3 (ref 1.7–7)
NEUTROPHILS NFR BLD AUTO: 43.7 % (ref 42.7–76)
NRBC BLD AUTO-RTO: 0 /100 WBC (ref 0–0.2)
PLATELET # BLD AUTO: 314 10*3/MM3 (ref 140–450)
PMV BLD AUTO: 8 FL (ref 6–12)
POTASSIUM SERPL-SCNC: 3.9 MMOL/L (ref 3.5–5.2)
RBC # BLD AUTO: 3.07 10*6/MM3 (ref 4.14–5.8)
SARS-COV-2 RNA PNL SPEC NAA+PROBE: NOT DETECTED
SODIUM SERPL-SCNC: 143 MMOL/L (ref 136–145)
STRESS TARGET HR: 128 BPM
TROPONIN T SERPL-MCNC: 0.15 NG/ML (ref 0–0.03)
TROPONIN T SERPL-MCNC: 0.17 NG/ML (ref 0–0.03)
WBC # BLD AUTO: 6.9 10*3/MM3 (ref 3.4–10.8)

## 2021-07-21 PROCEDURE — 25010000002 FUROSEMIDE PER 20 MG: Performed by: INTERNAL MEDICINE

## 2021-07-21 PROCEDURE — 84484 ASSAY OF TROPONIN QUANT: CPT | Performed by: INTERNAL MEDICINE

## 2021-07-21 PROCEDURE — 93306 TTE W/DOPPLER COMPLETE: CPT

## 2021-07-21 PROCEDURE — G0378 HOSPITAL OBSERVATION PER HR: HCPCS

## 2021-07-21 PROCEDURE — 87635 SARS-COV-2 COVID-19 AMP PRB: CPT | Performed by: INTERNAL MEDICINE

## 2021-07-21 PROCEDURE — 63710000001 INSULIN LISPRO (HUMAN) PER 5 UNITS: Performed by: INTERNAL MEDICINE

## 2021-07-21 PROCEDURE — 25010000002 FUROSEMIDE PER 20 MG: Performed by: PHYSICIAN ASSISTANT

## 2021-07-21 PROCEDURE — 80048 BASIC METABOLIC PNL TOTAL CA: CPT | Performed by: INTERNAL MEDICINE

## 2021-07-21 PROCEDURE — 99226 PR SBSQ OBSERVATION CARE/DAY 35 MINUTES: CPT | Performed by: INTERNAL MEDICINE

## 2021-07-21 PROCEDURE — 63710000001 INSULIN ISOPHANE & REGULAR PER 5 UNITS: Performed by: INTERNAL MEDICINE

## 2021-07-21 PROCEDURE — 93306 TTE W/DOPPLER COMPLETE: CPT | Performed by: INTERNAL MEDICINE

## 2021-07-21 PROCEDURE — 94799 UNLISTED PULMONARY SVC/PX: CPT

## 2021-07-21 PROCEDURE — 82962 GLUCOSE BLOOD TEST: CPT

## 2021-07-21 PROCEDURE — 85027 COMPLETE CBC AUTOMATED: CPT | Performed by: INTERNAL MEDICINE

## 2021-07-21 PROCEDURE — 94640 AIRWAY INHALATION TREATMENT: CPT

## 2021-07-21 PROCEDURE — 84484 ASSAY OF TROPONIN QUANT: CPT | Performed by: PHYSICIAN ASSISTANT

## 2021-07-21 PROCEDURE — 83036 HEMOGLOBIN GLYCOSYLATED A1C: CPT | Performed by: INTERNAL MEDICINE

## 2021-07-21 RX ORDER — NITROGLYCERIN 0.4 MG/1
0.4 TABLET SUBLINGUAL
Status: DISCONTINUED | OUTPATIENT
Start: 2021-07-21 | End: 2021-08-03 | Stop reason: HOSPADM

## 2021-07-21 RX ORDER — INSULIN LISPRO 100 [IU]/ML
0-7 INJECTION, SOLUTION INTRAVENOUS; SUBCUTANEOUS AS NEEDED
Status: DISCONTINUED | OUTPATIENT
Start: 2021-07-21 | End: 2021-08-03 | Stop reason: HOSPADM

## 2021-07-21 RX ORDER — FUROSEMIDE 40 MG/1
40 TABLET ORAL 2 TIMES DAILY
Status: DISCONTINUED | OUTPATIENT
Start: 2021-07-21 | End: 2021-07-21

## 2021-07-21 RX ORDER — HYDRALAZINE HYDROCHLORIDE 25 MG/1
50 TABLET, FILM COATED ORAL EVERY 12 HOURS SCHEDULED
Status: DISCONTINUED | OUTPATIENT
Start: 2021-07-21 | End: 2021-08-03 | Stop reason: HOSPADM

## 2021-07-21 RX ORDER — MELATONIN
1000 DAILY
Status: DISCONTINUED | OUTPATIENT
Start: 2021-07-21 | End: 2021-08-03 | Stop reason: HOSPADM

## 2021-07-21 RX ORDER — GABAPENTIN 100 MG/1
200 CAPSULE ORAL EVERY 12 HOURS SCHEDULED
Status: DISCONTINUED | OUTPATIENT
Start: 2021-07-21 | End: 2021-08-03 | Stop reason: HOSPADM

## 2021-07-21 RX ORDER — ASPIRIN 81 MG/1
81 TABLET, CHEWABLE ORAL DAILY
Status: DISCONTINUED | OUTPATIENT
Start: 2021-07-21 | End: 2021-08-03 | Stop reason: HOSPADM

## 2021-07-21 RX ORDER — ISOSORBIDE MONONITRATE 60 MG/1
60 TABLET, EXTENDED RELEASE ORAL
Status: DISCONTINUED | OUTPATIENT
Start: 2021-07-21 | End: 2021-08-03 | Stop reason: HOSPADM

## 2021-07-21 RX ORDER — FUROSEMIDE 10 MG/ML
40 INJECTION INTRAMUSCULAR; INTRAVENOUS EVERY 12 HOURS
Status: DISCONTINUED | OUTPATIENT
Start: 2021-07-21 | End: 2021-07-21

## 2021-07-21 RX ORDER — LANOLIN ALCOHOL/MO/W.PET/CERES
1000 CREAM (GRAM) TOPICAL DAILY
Status: DISCONTINUED | OUTPATIENT
Start: 2021-07-21 | End: 2021-08-03 | Stop reason: HOSPADM

## 2021-07-21 RX ORDER — HYDROCODONE BITARTRATE AND ACETAMINOPHEN 7.5; 325 MG/1; MG/1
1 TABLET ORAL EVERY 12 HOURS PRN
Status: DISCONTINUED | OUTPATIENT
Start: 2021-07-21 | End: 2021-08-03 | Stop reason: HOSPADM

## 2021-07-21 RX ORDER — NICOTINE POLACRILEX 4 MG
15 LOZENGE BUCCAL
Status: DISCONTINUED | OUTPATIENT
Start: 2021-07-21 | End: 2021-08-03 | Stop reason: HOSPADM

## 2021-07-21 RX ORDER — CLOPIDOGREL BISULFATE 75 MG/1
75 TABLET ORAL DAILY
Status: DISCONTINUED | OUTPATIENT
Start: 2021-07-21 | End: 2021-08-03 | Stop reason: HOSPADM

## 2021-07-21 RX ORDER — SODIUM CHLORIDE 0.9 % (FLUSH) 0.9 %
10 SYRINGE (ML) INJECTION AS NEEDED
Status: DISCONTINUED | OUTPATIENT
Start: 2021-07-21 | End: 2021-08-03 | Stop reason: HOSPADM

## 2021-07-21 RX ORDER — ALBUTEROL SULFATE 2.5 MG/3ML
2.5 SOLUTION RESPIRATORY (INHALATION) EVERY 4 HOURS PRN
Status: DISCONTINUED | OUTPATIENT
Start: 2021-07-21 | End: 2021-08-03 | Stop reason: HOSPADM

## 2021-07-21 RX ORDER — ATENOLOL 50 MG/1
100 TABLET ORAL DAILY
Status: DISCONTINUED | OUTPATIENT
Start: 2021-07-21 | End: 2021-07-24

## 2021-07-21 RX ORDER — SODIUM CHLORIDE 0.9 % (FLUSH) 0.9 %
10 SYRINGE (ML) INJECTION EVERY 12 HOURS SCHEDULED
Status: DISCONTINUED | OUTPATIENT
Start: 2021-07-21 | End: 2021-08-03 | Stop reason: HOSPADM

## 2021-07-21 RX ORDER — ONDANSETRON 4 MG/1
4 TABLET, FILM COATED ORAL EVERY 6 HOURS PRN
Status: DISCONTINUED | OUTPATIENT
Start: 2021-07-21 | End: 2021-08-03 | Stop reason: HOSPADM

## 2021-07-21 RX ORDER — DONEPEZIL HYDROCHLORIDE 5 MG/1
10 TABLET, FILM COATED ORAL NIGHTLY
Status: DISCONTINUED | OUTPATIENT
Start: 2021-07-21 | End: 2021-08-03 | Stop reason: HOSPADM

## 2021-07-21 RX ORDER — DULOXETIN HYDROCHLORIDE 30 MG/1
60 CAPSULE, DELAYED RELEASE ORAL DAILY
Status: DISCONTINUED | OUTPATIENT
Start: 2021-07-21 | End: 2021-08-03 | Stop reason: HOSPADM

## 2021-07-21 RX ORDER — INSULIN LISPRO 100 [IU]/ML
0-7 INJECTION, SOLUTION INTRAVENOUS; SUBCUTANEOUS
Status: DISCONTINUED | OUTPATIENT
Start: 2021-07-21 | End: 2021-08-03 | Stop reason: HOSPADM

## 2021-07-21 RX ORDER — DEXTROSE MONOHYDRATE 25 G/50ML
25 INJECTION, SOLUTION INTRAVENOUS
Status: DISCONTINUED | OUTPATIENT
Start: 2021-07-21 | End: 2021-08-03 | Stop reason: HOSPADM

## 2021-07-21 RX ORDER — DIGOXIN 125 MCG
125 TABLET ORAL
Status: DISCONTINUED | OUTPATIENT
Start: 2021-07-21 | End: 2021-07-24

## 2021-07-21 RX ORDER — IPRATROPIUM BROMIDE AND ALBUTEROL SULFATE 2.5; .5 MG/3ML; MG/3ML
3 SOLUTION RESPIRATORY (INHALATION) EVERY 4 HOURS PRN
Status: DISCONTINUED | OUTPATIENT
Start: 2021-07-21 | End: 2021-08-03 | Stop reason: HOSPADM

## 2021-07-21 RX ORDER — ATORVASTATIN CALCIUM 40 MG/1
40 TABLET, FILM COATED ORAL NIGHTLY
Status: DISCONTINUED | OUTPATIENT
Start: 2021-07-21 | End: 2021-08-03 | Stop reason: HOSPADM

## 2021-07-21 RX ORDER — FUROSEMIDE 10 MG/ML
80 INJECTION INTRAMUSCULAR; INTRAVENOUS EVERY 12 HOURS
Status: DISCONTINUED | OUTPATIENT
Start: 2021-07-21 | End: 2021-07-25

## 2021-07-21 RX ADMIN — IPRATROPIUM BROMIDE AND ALBUTEROL SULFATE 3 ML: 2.5; .5 SOLUTION RESPIRATORY (INHALATION) at 14:48

## 2021-07-21 RX ADMIN — ISOSORBIDE MONONITRATE 60 MG: 60 TABLET, EXTENDED RELEASE ORAL at 08:06

## 2021-07-21 RX ADMIN — GABAPENTIN 200 MG: 100 CAPSULE ORAL at 21:18

## 2021-07-21 RX ADMIN — DULOXETINE 60 MG: 30 CAPSULE, DELAYED RELEASE ORAL at 08:07

## 2021-07-21 RX ADMIN — FUROSEMIDE 80 MG: 10 INJECTION, SOLUTION INTRAMUSCULAR; INTRAVENOUS at 05:42

## 2021-07-21 RX ADMIN — FUROSEMIDE 80 MG: 10 INJECTION, SOLUTION INTRAMUSCULAR; INTRAVENOUS at 17:01

## 2021-07-21 RX ADMIN — FUROSEMIDE 40 MG: 10 INJECTION, SOLUTION INTRAMUSCULAR; INTRAVENOUS at 00:20

## 2021-07-21 RX ADMIN — Medication 10 ML: at 21:18

## 2021-07-21 RX ADMIN — APIXABAN 5 MG: 5 TABLET, FILM COATED ORAL at 08:06

## 2021-07-21 RX ADMIN — Medication 10 ML: at 03:20

## 2021-07-21 RX ADMIN — ATORVASTATIN CALCIUM 40 MG: 40 TABLET, FILM COATED ORAL at 21:18

## 2021-07-21 RX ADMIN — GABAPENTIN 200 MG: 100 CAPSULE ORAL at 08:06

## 2021-07-21 RX ADMIN — Medication 1000 UNITS: at 08:07

## 2021-07-21 RX ADMIN — INSULIN LISPRO 2 UNITS: 100 INJECTION, SOLUTION INTRAVENOUS; SUBCUTANEOUS at 12:51

## 2021-07-21 RX ADMIN — CLOPIDOGREL BISULFATE 75 MG: 75 TABLET ORAL at 08:07

## 2021-07-21 RX ADMIN — INSULIN HUMAN 30 UNITS: 100 INJECTION, SUSPENSION SUBCUTANEOUS at 08:07

## 2021-07-21 RX ADMIN — ATENOLOL 100 MG: 50 TABLET ORAL at 08:07

## 2021-07-21 RX ADMIN — Medication 10 ML: at 08:08

## 2021-07-21 RX ADMIN — ISOSORBIDE MONONITRATE 60 MG: 60 TABLET, EXTENDED RELEASE ORAL at 17:01

## 2021-07-21 RX ADMIN — CYANOCOBALAMIN TAB 1000 MCG 1000 MCG: 1000 TAB at 08:06

## 2021-07-21 RX ADMIN — APIXABAN 5 MG: 5 TABLET, FILM COATED ORAL at 21:18

## 2021-07-21 RX ADMIN — DIGOXIN 125 MCG: 125 TABLET ORAL at 12:51

## 2021-07-21 RX ADMIN — ASPIRIN 81 MG CHEWABLE TABLET 81 MG: 81 TABLET CHEWABLE at 08:06

## 2021-07-21 RX ADMIN — DONEPEZIL HYDROCHLORIDE 10 MG: 5 TABLET, FILM COATED ORAL at 21:18

## 2021-07-21 RX ADMIN — HYDRALAZINE HYDROCHLORIDE 50 MG: 25 TABLET, FILM COATED ORAL at 08:06

## 2021-07-21 RX ADMIN — HYDRALAZINE HYDROCHLORIDE 50 MG: 25 TABLET, FILM COATED ORAL at 21:18

## 2021-07-22 LAB
ANION GAP SERPL CALCULATED.3IONS-SCNC: 10 MMOL/L (ref 5–15)
BASOPHILS # BLD AUTO: 0.1 10*3/MM3 (ref 0–0.2)
BASOPHILS NFR BLD AUTO: 2.1 % (ref 0–1.5)
BUN SERPL-MCNC: 33 MG/DL (ref 8–23)
BUN/CREAT SERPL: 16.7 (ref 7–25)
CALCIUM SPEC-SCNC: 8.8 MG/DL (ref 8.6–10.5)
CHLORIDE SERPL-SCNC: 101 MMOL/L (ref 98–107)
CO2 SERPL-SCNC: 32 MMOL/L (ref 22–29)
CREAT SERPL-MCNC: 1.98 MG/DL (ref 0.76–1.27)
DEPRECATED RDW RBC AUTO: 47.3 FL (ref 37–54)
EOSINOPHIL # BLD AUTO: 0.6 10*3/MM3 (ref 0–0.4)
EOSINOPHIL NFR BLD AUTO: 9.5 % (ref 0.3–6.2)
ERYTHROCYTE [DISTWIDTH] IN BLOOD BY AUTOMATED COUNT: 15.2 % (ref 12.3–15.4)
GFR SERPL CREATININE-BSD FRML MDRD: 34 ML/MIN/1.73
GLUCOSE BLDC GLUCOMTR-MCNC: 130 MG/DL (ref 70–105)
GLUCOSE BLDC GLUCOMTR-MCNC: 159 MG/DL (ref 70–105)
GLUCOSE BLDC GLUCOMTR-MCNC: 184 MG/DL (ref 70–105)
GLUCOSE BLDC GLUCOMTR-MCNC: 87 MG/DL (ref 70–105)
GLUCOSE SERPL-MCNC: 160 MG/DL (ref 65–99)
HCT VFR BLD AUTO: 27.6 % (ref 37.5–51)
HGB BLD-MCNC: 8.9 G/DL (ref 13–17.7)
HOLD SPECIMEN: NORMAL
LYMPHOCYTES # BLD AUTO: 2 10*3/MM3 (ref 0.7–3.1)
LYMPHOCYTES NFR BLD AUTO: 30.7 % (ref 19.6–45.3)
MAGNESIUM SERPL-MCNC: 2.2 MG/DL (ref 1.6–2.4)
MCH RBC QN AUTO: 28.2 PG (ref 26.6–33)
MCHC RBC AUTO-ENTMCNC: 32.3 G/DL (ref 31.5–35.7)
MCV RBC AUTO: 87.3 FL (ref 79–97)
MONOCYTES # BLD AUTO: 0.7 10*3/MM3 (ref 0.1–0.9)
MONOCYTES NFR BLD AUTO: 10.2 % (ref 5–12)
NEUTROPHILS NFR BLD AUTO: 3.1 10*3/MM3 (ref 1.7–7)
NEUTROPHILS NFR BLD AUTO: 47.5 % (ref 42.7–76)
NRBC BLD AUTO-RTO: 0.1 /100 WBC (ref 0–0.2)
PLATELET # BLD AUTO: 331 10*3/MM3 (ref 140–450)
PMV BLD AUTO: 7.9 FL (ref 6–12)
POTASSIUM SERPL-SCNC: 4.5 MMOL/L (ref 3.5–5.2)
QT INTERVAL: 340 MS
RBC # BLD AUTO: 3.17 10*6/MM3 (ref 4.14–5.8)
SODIUM SERPL-SCNC: 143 MMOL/L (ref 136–145)
TROPONIN T SERPL-MCNC: 0.2 NG/ML (ref 0–0.03)
WBC # BLD AUTO: 6.5 10*3/MM3 (ref 3.4–10.8)

## 2021-07-22 PROCEDURE — 94799 UNLISTED PULMONARY SVC/PX: CPT

## 2021-07-22 PROCEDURE — G0378 HOSPITAL OBSERVATION PER HR: HCPCS

## 2021-07-22 PROCEDURE — 93010 ELECTROCARDIOGRAM REPORT: CPT | Performed by: INTERNAL MEDICINE

## 2021-07-22 PROCEDURE — 63710000001 INSULIN LISPRO (HUMAN) PER 5 UNITS: Performed by: INTERNAL MEDICINE

## 2021-07-22 PROCEDURE — 99222 1ST HOSP IP/OBS MODERATE 55: CPT | Performed by: INTERNAL MEDICINE

## 2021-07-22 PROCEDURE — 25010000002 FUROSEMIDE PER 20 MG: Performed by: INTERNAL MEDICINE

## 2021-07-22 PROCEDURE — 93005 ELECTROCARDIOGRAM TRACING: CPT | Performed by: INTERNAL MEDICINE

## 2021-07-22 PROCEDURE — 99226 PR SBSQ OBSERVATION CARE/DAY 35 MINUTES: CPT | Performed by: INTERNAL MEDICINE

## 2021-07-22 PROCEDURE — 63710000001 INSULIN ISOPHANE & REGULAR PER 5 UNITS: Performed by: INTERNAL MEDICINE

## 2021-07-22 PROCEDURE — 94660 CPAP INITIATION&MGMT: CPT

## 2021-07-22 PROCEDURE — 85025 COMPLETE CBC W/AUTO DIFF WBC: CPT | Performed by: INTERNAL MEDICINE

## 2021-07-22 PROCEDURE — 83735 ASSAY OF MAGNESIUM: CPT | Performed by: INTERNAL MEDICINE

## 2021-07-22 PROCEDURE — 82962 GLUCOSE BLOOD TEST: CPT

## 2021-07-22 PROCEDURE — 80048 BASIC METABOLIC PNL TOTAL CA: CPT | Performed by: INTERNAL MEDICINE

## 2021-07-22 PROCEDURE — 84484 ASSAY OF TROPONIN QUANT: CPT | Performed by: INTERNAL MEDICINE

## 2021-07-22 RX ADMIN — ISOSORBIDE MONONITRATE 60 MG: 60 TABLET, EXTENDED RELEASE ORAL at 17:08

## 2021-07-22 RX ADMIN — GABAPENTIN 200 MG: 100 CAPSULE ORAL at 09:20

## 2021-07-22 RX ADMIN — HYDRALAZINE HYDROCHLORIDE 50 MG: 25 TABLET, FILM COATED ORAL at 21:10

## 2021-07-22 RX ADMIN — INSULIN HUMAN 30 UNITS: 100 INJECTION, SUSPENSION SUBCUTANEOUS at 09:20

## 2021-07-22 RX ADMIN — IPRATROPIUM BROMIDE AND ALBUTEROL SULFATE 3 ML: 2.5; .5 SOLUTION RESPIRATORY (INHALATION) at 11:26

## 2021-07-22 RX ADMIN — ATENOLOL 100 MG: 50 TABLET ORAL at 09:21

## 2021-07-22 RX ADMIN — INSULIN LISPRO 2 UNITS: 100 INJECTION, SOLUTION INTRAVENOUS; SUBCUTANEOUS at 12:51

## 2021-07-22 RX ADMIN — Medication 10 ML: at 09:21

## 2021-07-22 RX ADMIN — FUROSEMIDE 80 MG: 10 INJECTION, SOLUTION INTRAMUSCULAR; INTRAVENOUS at 17:08

## 2021-07-22 RX ADMIN — ATORVASTATIN CALCIUM 40 MG: 40 TABLET, FILM COATED ORAL at 21:09

## 2021-07-22 RX ADMIN — ASPIRIN 81 MG CHEWABLE TABLET 81 MG: 81 TABLET CHEWABLE at 09:20

## 2021-07-22 RX ADMIN — GABAPENTIN 200 MG: 100 CAPSULE ORAL at 21:10

## 2021-07-22 RX ADMIN — Medication 1000 UNITS: at 09:20

## 2021-07-22 RX ADMIN — HYDRALAZINE HYDROCHLORIDE 50 MG: 25 TABLET, FILM COATED ORAL at 09:20

## 2021-07-22 RX ADMIN — CLOPIDOGREL BISULFATE 75 MG: 75 TABLET ORAL at 09:20

## 2021-07-22 RX ADMIN — Medication 10 ML: at 21:09

## 2021-07-22 RX ADMIN — INSULIN HUMAN 30 UNITS: 100 INJECTION, SUSPENSION SUBCUTANEOUS at 17:08

## 2021-07-22 RX ADMIN — FUROSEMIDE 80 MG: 10 INJECTION, SOLUTION INTRAMUSCULAR; INTRAVENOUS at 06:02

## 2021-07-22 RX ADMIN — APIXABAN 5 MG: 5 TABLET, FILM COATED ORAL at 21:09

## 2021-07-22 RX ADMIN — DIGOXIN 125 MCG: 125 TABLET ORAL at 12:51

## 2021-07-22 RX ADMIN — INSULIN LISPRO 2 UNITS: 100 INJECTION, SOLUTION INTRAVENOUS; SUBCUTANEOUS at 17:08

## 2021-07-22 RX ADMIN — DULOXETINE 60 MG: 30 CAPSULE, DELAYED RELEASE ORAL at 09:20

## 2021-07-22 RX ADMIN — DONEPEZIL HYDROCHLORIDE 10 MG: 5 TABLET, FILM COATED ORAL at 21:09

## 2021-07-22 RX ADMIN — CYANOCOBALAMIN TAB 1000 MCG 1000 MCG: 1000 TAB at 09:21

## 2021-07-22 RX ADMIN — APIXABAN 5 MG: 5 TABLET, FILM COATED ORAL at 09:20

## 2021-07-22 RX ADMIN — ISOSORBIDE MONONITRATE 60 MG: 60 TABLET, EXTENDED RELEASE ORAL at 09:20

## 2021-07-23 PROBLEM — I50.23 ACUTE ON CHRONIC SYSTOLIC HEART FAILURE (HCC): Status: ACTIVE | Noted: 2021-07-23

## 2021-07-23 PROBLEM — I47.29 NONSUSTAINED VENTRICULAR TACHYCARDIA (HCC): Status: ACTIVE | Noted: 2021-07-23

## 2021-07-23 LAB
GLUCOSE BLDC GLUCOMTR-MCNC: 126 MG/DL (ref 70–105)
GLUCOSE BLDC GLUCOMTR-MCNC: 155 MG/DL (ref 70–105)
GLUCOSE BLDC GLUCOMTR-MCNC: 67 MG/DL (ref 70–105)
GLUCOSE BLDC GLUCOMTR-MCNC: 82 MG/DL (ref 70–105)
GLUCOSE BLDC GLUCOMTR-MCNC: 95 MG/DL (ref 70–105)

## 2021-07-23 PROCEDURE — 25010000002 FUROSEMIDE PER 20 MG: Performed by: INTERNAL MEDICINE

## 2021-07-23 PROCEDURE — 97530 THERAPEUTIC ACTIVITIES: CPT

## 2021-07-23 PROCEDURE — 99233 SBSQ HOSP IP/OBS HIGH 50: CPT | Performed by: INTERNAL MEDICINE

## 2021-07-23 PROCEDURE — 97165 OT EVAL LOW COMPLEX 30 MIN: CPT

## 2021-07-23 PROCEDURE — 82962 GLUCOSE BLOOD TEST: CPT

## 2021-07-23 PROCEDURE — 97162 PT EVAL MOD COMPLEX 30 MIN: CPT

## 2021-07-23 PROCEDURE — 63710000001 INSULIN LISPRO (HUMAN) PER 5 UNITS: Performed by: INTERNAL MEDICINE

## 2021-07-23 PROCEDURE — 63710000001 INSULIN ISOPHANE & REGULAR PER 5 UNITS: Performed by: INTERNAL MEDICINE

## 2021-07-23 RX ADMIN — FUROSEMIDE 80 MG: 10 INJECTION, SOLUTION INTRAMUSCULAR; INTRAVENOUS at 17:18

## 2021-07-23 RX ADMIN — ATORVASTATIN CALCIUM 40 MG: 40 TABLET, FILM COATED ORAL at 20:28

## 2021-07-23 RX ADMIN — Medication 10 ML: at 20:28

## 2021-07-23 RX ADMIN — Medication 1000 UNITS: at 08:44

## 2021-07-23 RX ADMIN — ATENOLOL 100 MG: 50 TABLET ORAL at 08:44

## 2021-07-23 RX ADMIN — GABAPENTIN 200 MG: 100 CAPSULE ORAL at 08:45

## 2021-07-23 RX ADMIN — Medication 10 ML: at 08:45

## 2021-07-23 RX ADMIN — ISOSORBIDE MONONITRATE 60 MG: 60 TABLET, EXTENDED RELEASE ORAL at 17:18

## 2021-07-23 RX ADMIN — HYDRALAZINE HYDROCHLORIDE 50 MG: 25 TABLET, FILM COATED ORAL at 20:29

## 2021-07-23 RX ADMIN — ISOSORBIDE MONONITRATE 60 MG: 60 TABLET, EXTENDED RELEASE ORAL at 08:44

## 2021-07-23 RX ADMIN — APIXABAN 5 MG: 5 TABLET, FILM COATED ORAL at 20:28

## 2021-07-23 RX ADMIN — GABAPENTIN 200 MG: 100 CAPSULE ORAL at 20:28

## 2021-07-23 RX ADMIN — INSULIN HUMAN 30 UNITS: 100 INJECTION, SUSPENSION SUBCUTANEOUS at 08:44

## 2021-07-23 RX ADMIN — HYDRALAZINE HYDROCHLORIDE 50 MG: 25 TABLET, FILM COATED ORAL at 08:44

## 2021-07-23 RX ADMIN — INSULIN LISPRO 2 UNITS: 100 INJECTION, SOLUTION INTRAVENOUS; SUBCUTANEOUS at 17:18

## 2021-07-23 RX ADMIN — FUROSEMIDE 80 MG: 10 INJECTION, SOLUTION INTRAMUSCULAR; INTRAVENOUS at 05:42

## 2021-07-23 RX ADMIN — INSULIN HUMAN 30 UNITS: 100 INJECTION, SUSPENSION SUBCUTANEOUS at 17:19

## 2021-07-23 RX ADMIN — DIGOXIN 125 MCG: 125 TABLET ORAL at 13:30

## 2021-07-23 RX ADMIN — DULOXETINE 60 MG: 30 CAPSULE, DELAYED RELEASE ORAL at 08:44

## 2021-07-23 RX ADMIN — CYANOCOBALAMIN TAB 1000 MCG 1000 MCG: 1000 TAB at 08:45

## 2021-07-23 RX ADMIN — APIXABAN 5 MG: 5 TABLET, FILM COATED ORAL at 08:45

## 2021-07-23 RX ADMIN — DONEPEZIL HYDROCHLORIDE 10 MG: 5 TABLET, FILM COATED ORAL at 20:28

## 2021-07-23 RX ADMIN — ASPIRIN 81 MG CHEWABLE TABLET 81 MG: 81 TABLET CHEWABLE at 08:44

## 2021-07-23 RX ADMIN — CLOPIDOGREL BISULFATE 75 MG: 75 TABLET ORAL at 08:45

## 2021-07-24 LAB
ANION GAP SERPL CALCULATED.3IONS-SCNC: 7 MMOL/L (ref 5–15)
BUN SERPL-MCNC: 38 MG/DL (ref 8–23)
BUN/CREAT SERPL: 17.7 (ref 7–25)
CALCIUM SPEC-SCNC: 8.5 MG/DL (ref 8.6–10.5)
CHLORIDE SERPL-SCNC: 97 MMOL/L (ref 98–107)
CO2 SERPL-SCNC: 35 MMOL/L (ref 22–29)
CREAT SERPL-MCNC: 2.15 MG/DL (ref 0.76–1.27)
GFR SERPL CREATININE-BSD FRML MDRD: 31 ML/MIN/1.73
GLUCOSE BLDC GLUCOMTR-MCNC: 142 MG/DL (ref 70–105)
GLUCOSE BLDC GLUCOMTR-MCNC: 199 MG/DL (ref 70–105)
GLUCOSE BLDC GLUCOMTR-MCNC: 212 MG/DL (ref 70–105)
GLUCOSE BLDC GLUCOMTR-MCNC: 64 MG/DL (ref 70–105)
GLUCOSE BLDC GLUCOMTR-MCNC: 73 MG/DL (ref 70–105)
GLUCOSE SERPL-MCNC: 185 MG/DL (ref 65–99)
POTASSIUM SERPL-SCNC: 4.2 MMOL/L (ref 3.5–5.2)
SODIUM SERPL-SCNC: 139 MMOL/L (ref 136–145)

## 2021-07-24 PROCEDURE — 99232 SBSQ HOSP IP/OBS MODERATE 35: CPT | Performed by: INTERNAL MEDICINE

## 2021-07-24 PROCEDURE — 63710000001 INSULIN ISOPHANE & REGULAR PER 5 UNITS: Performed by: INTERNAL MEDICINE

## 2021-07-24 PROCEDURE — 82962 GLUCOSE BLOOD TEST: CPT

## 2021-07-24 PROCEDURE — 25010000002 FUROSEMIDE PER 20 MG: Performed by: INTERNAL MEDICINE

## 2021-07-24 PROCEDURE — 93010 ELECTROCARDIOGRAM REPORT: CPT | Performed by: INTERNAL MEDICINE

## 2021-07-24 PROCEDURE — 63710000001 INSULIN LISPRO (HUMAN) PER 5 UNITS: Performed by: INTERNAL MEDICINE

## 2021-07-24 PROCEDURE — 93005 ELECTROCARDIOGRAM TRACING: CPT | Performed by: INTERNAL MEDICINE

## 2021-07-24 PROCEDURE — 80048 BASIC METABOLIC PNL TOTAL CA: CPT | Performed by: INTERNAL MEDICINE

## 2021-07-24 RX ADMIN — ASPIRIN 81 MG CHEWABLE TABLET 81 MG: 81 TABLET CHEWABLE at 09:14

## 2021-07-24 RX ADMIN — FUROSEMIDE 80 MG: 10 INJECTION, SOLUTION INTRAMUSCULAR; INTRAVENOUS at 18:07

## 2021-07-24 RX ADMIN — INSULIN LISPRO 2 UNITS: 100 INJECTION, SOLUTION INTRAVENOUS; SUBCUTANEOUS at 12:22

## 2021-07-24 RX ADMIN — DULOXETINE 60 MG: 30 CAPSULE, DELAYED RELEASE ORAL at 09:14

## 2021-07-24 RX ADMIN — ISOSORBIDE MONONITRATE 60 MG: 60 TABLET, EXTENDED RELEASE ORAL at 18:07

## 2021-07-24 RX ADMIN — ATENOLOL 100 MG: 50 TABLET ORAL at 09:14

## 2021-07-24 RX ADMIN — APIXABAN 5 MG: 5 TABLET, FILM COATED ORAL at 09:14

## 2021-07-24 RX ADMIN — CLOPIDOGREL BISULFATE 75 MG: 75 TABLET ORAL at 09:14

## 2021-07-24 RX ADMIN — Medication 10 ML: at 09:14

## 2021-07-24 RX ADMIN — GABAPENTIN 200 MG: 100 CAPSULE ORAL at 20:46

## 2021-07-24 RX ADMIN — APIXABAN 5 MG: 5 TABLET, FILM COATED ORAL at 20:46

## 2021-07-24 RX ADMIN — ATORVASTATIN CALCIUM 40 MG: 40 TABLET, FILM COATED ORAL at 20:46

## 2021-07-24 RX ADMIN — HYDRALAZINE HYDROCHLORIDE 50 MG: 25 TABLET, FILM COATED ORAL at 09:14

## 2021-07-24 RX ADMIN — Medication 1000 UNITS: at 09:14

## 2021-07-24 RX ADMIN — CYANOCOBALAMIN TAB 1000 MCG 1000 MCG: 1000 TAB at 09:14

## 2021-07-24 RX ADMIN — Medication 10 ML: at 20:47

## 2021-07-24 RX ADMIN — HYDRALAZINE HYDROCHLORIDE 50 MG: 25 TABLET, FILM COATED ORAL at 20:46

## 2021-07-24 RX ADMIN — INSULIN HUMAN 30 UNITS: 100 INJECTION, SUSPENSION SUBCUTANEOUS at 18:07

## 2021-07-24 RX ADMIN — ISOSORBIDE MONONITRATE 60 MG: 60 TABLET, EXTENDED RELEASE ORAL at 09:14

## 2021-07-24 RX ADMIN — INSULIN LISPRO 3 UNITS: 100 INJECTION, SOLUTION INTRAVENOUS; SUBCUTANEOUS at 18:07

## 2021-07-24 RX ADMIN — GABAPENTIN 200 MG: 100 CAPSULE ORAL at 09:14

## 2021-07-24 RX ADMIN — DONEPEZIL HYDROCHLORIDE 10 MG: 5 TABLET, FILM COATED ORAL at 20:46

## 2021-07-24 RX ADMIN — FUROSEMIDE 80 MG: 10 INJECTION, SOLUTION INTRAMUSCULAR; INTRAVENOUS at 06:10

## 2021-07-24 RX ADMIN — DIGOXIN 125 MCG: 125 TABLET ORAL at 12:22

## 2021-07-25 LAB
ANION GAP SERPL CALCULATED.3IONS-SCNC: 9 MMOL/L (ref 5–15)
BUN SERPL-MCNC: 44 MG/DL (ref 8–23)
BUN/CREAT SERPL: 19.8 (ref 7–25)
CALCIUM SPEC-SCNC: 8.5 MG/DL (ref 8.6–10.5)
CHLORIDE SERPL-SCNC: 99 MMOL/L (ref 98–107)
CO2 SERPL-SCNC: 34 MMOL/L (ref 22–29)
CREAT SERPL-MCNC: 2.22 MG/DL (ref 0.76–1.27)
GFR SERPL CREATININE-BSD FRML MDRD: 29 ML/MIN/1.73
GLUCOSE BLDC GLUCOMTR-MCNC: 137 MG/DL (ref 70–105)
GLUCOSE BLDC GLUCOMTR-MCNC: 138 MG/DL (ref 70–105)
GLUCOSE BLDC GLUCOMTR-MCNC: 152 MG/DL (ref 70–105)
GLUCOSE BLDC GLUCOMTR-MCNC: 195 MG/DL (ref 70–105)
GLUCOSE BLDC GLUCOMTR-MCNC: 78 MG/DL (ref 70–105)
GLUCOSE SERPL-MCNC: 52 MG/DL (ref 65–99)
POTASSIUM SERPL-SCNC: 4.6 MMOL/L (ref 3.5–5.2)
SODIUM SERPL-SCNC: 142 MMOL/L (ref 136–145)

## 2021-07-25 PROCEDURE — 97116 GAIT TRAINING THERAPY: CPT

## 2021-07-25 PROCEDURE — 82962 GLUCOSE BLOOD TEST: CPT

## 2021-07-25 PROCEDURE — 63710000001 INSULIN LISPRO (HUMAN) PER 5 UNITS: Performed by: INTERNAL MEDICINE

## 2021-07-25 PROCEDURE — 97112 NEUROMUSCULAR REEDUCATION: CPT

## 2021-07-25 PROCEDURE — 80048 BASIC METABOLIC PNL TOTAL CA: CPT | Performed by: INTERNAL MEDICINE

## 2021-07-25 PROCEDURE — 63710000001 INSULIN ISOPHANE & REGULAR PER 5 UNITS: Performed by: INTERNAL MEDICINE

## 2021-07-25 PROCEDURE — 99233 SBSQ HOSP IP/OBS HIGH 50: CPT | Performed by: INTERNAL MEDICINE

## 2021-07-25 PROCEDURE — 25010000002 FUROSEMIDE PER 20 MG: Performed by: INTERNAL MEDICINE

## 2021-07-25 PROCEDURE — 97110 THERAPEUTIC EXERCISES: CPT

## 2021-07-25 PROCEDURE — 99232 SBSQ HOSP IP/OBS MODERATE 35: CPT | Performed by: INTERNAL MEDICINE

## 2021-07-25 RX ORDER — FUROSEMIDE 40 MG/1
80 TABLET ORAL
Status: DISCONTINUED | OUTPATIENT
Start: 2021-07-25 | End: 2021-07-29

## 2021-07-25 RX ADMIN — ATORVASTATIN CALCIUM 40 MG: 40 TABLET, FILM COATED ORAL at 21:59

## 2021-07-25 RX ADMIN — CYANOCOBALAMIN TAB 1000 MCG 1000 MCG: 1000 TAB at 09:04

## 2021-07-25 RX ADMIN — FUROSEMIDE 80 MG: 10 INJECTION, SOLUTION INTRAMUSCULAR; INTRAVENOUS at 06:24

## 2021-07-25 RX ADMIN — APIXABAN 5 MG: 5 TABLET, FILM COATED ORAL at 21:59

## 2021-07-25 RX ADMIN — DONEPEZIL HYDROCHLORIDE 10 MG: 5 TABLET, FILM COATED ORAL at 21:59

## 2021-07-25 RX ADMIN — HYDRALAZINE HYDROCHLORIDE 50 MG: 25 TABLET, FILM COATED ORAL at 09:04

## 2021-07-25 RX ADMIN — Medication 1000 UNITS: at 09:04

## 2021-07-25 RX ADMIN — ISOSORBIDE MONONITRATE 60 MG: 60 TABLET, EXTENDED RELEASE ORAL at 09:04

## 2021-07-25 RX ADMIN — APIXABAN 5 MG: 5 TABLET, FILM COATED ORAL at 09:04

## 2021-07-25 RX ADMIN — Medication 10 ML: at 09:04

## 2021-07-25 RX ADMIN — GABAPENTIN 200 MG: 100 CAPSULE ORAL at 21:59

## 2021-07-25 RX ADMIN — FUROSEMIDE 80 MG: 40 TABLET ORAL at 17:17

## 2021-07-25 RX ADMIN — CLOPIDOGREL BISULFATE 75 MG: 75 TABLET ORAL at 09:04

## 2021-07-25 RX ADMIN — ASPIRIN 81 MG CHEWABLE TABLET 81 MG: 81 TABLET CHEWABLE at 09:04

## 2021-07-25 RX ADMIN — Medication 10 ML: at 21:59

## 2021-07-25 RX ADMIN — INSULIN LISPRO 2 UNITS: 100 INJECTION, SOLUTION INTRAVENOUS; SUBCUTANEOUS at 17:17

## 2021-07-25 RX ADMIN — GABAPENTIN 200 MG: 100 CAPSULE ORAL at 09:04

## 2021-07-25 RX ADMIN — DULOXETINE 60 MG: 30 CAPSULE, DELAYED RELEASE ORAL at 09:04

## 2021-07-25 RX ADMIN — INSULIN LISPRO 2 UNITS: 100 INJECTION, SOLUTION INTRAVENOUS; SUBCUTANEOUS at 12:20

## 2021-07-25 RX ADMIN — INSULIN HUMAN 30 UNITS: 100 INJECTION, SUSPENSION SUBCUTANEOUS at 09:05

## 2021-07-25 RX ADMIN — HYDRALAZINE HYDROCHLORIDE 50 MG: 25 TABLET, FILM COATED ORAL at 21:59

## 2021-07-25 RX ADMIN — INSULIN HUMAN 30 UNITS: 100 INJECTION, SUSPENSION SUBCUTANEOUS at 17:17

## 2021-07-26 LAB
ANION GAP SERPL CALCULATED.3IONS-SCNC: 9 MMOL/L (ref 5–15)
BUN SERPL-MCNC: 47 MG/DL (ref 8–23)
BUN/CREAT SERPL: 21.1 (ref 7–25)
CALCIUM SPEC-SCNC: 8.5 MG/DL (ref 8.6–10.5)
CHLORIDE SERPL-SCNC: 98 MMOL/L (ref 98–107)
CO2 SERPL-SCNC: 34 MMOL/L (ref 22–29)
CREAT SERPL-MCNC: 2.23 MG/DL (ref 0.76–1.27)
GFR SERPL CREATININE-BSD FRML MDRD: 29 ML/MIN/1.73
GLUCOSE BLDC GLUCOMTR-MCNC: 123 MG/DL (ref 70–105)
GLUCOSE BLDC GLUCOMTR-MCNC: 142 MG/DL (ref 70–105)
GLUCOSE BLDC GLUCOMTR-MCNC: 155 MG/DL (ref 70–105)
GLUCOSE BLDC GLUCOMTR-MCNC: 168 MG/DL (ref 70–105)
GLUCOSE BLDC GLUCOMTR-MCNC: 56 MG/DL (ref 70–105)
GLUCOSE BLDC GLUCOMTR-MCNC: 71 MG/DL (ref 70–105)
GLUCOSE SERPL-MCNC: 66 MG/DL (ref 65–99)
POTASSIUM SERPL-SCNC: 4.1 MMOL/L (ref 3.5–5.2)
QT INTERVAL: 374 MS
SODIUM SERPL-SCNC: 141 MMOL/L (ref 136–145)

## 2021-07-26 PROCEDURE — 80048 BASIC METABOLIC PNL TOTAL CA: CPT | Performed by: INTERNAL MEDICINE

## 2021-07-26 PROCEDURE — 94799 UNLISTED PULMONARY SVC/PX: CPT

## 2021-07-26 PROCEDURE — 25010000002 DOBUTAMINE PER 250 MG: Performed by: INTERNAL MEDICINE

## 2021-07-26 PROCEDURE — 63710000001 INSULIN ISOPHANE & REGULAR PER 5 UNITS: Performed by: INTERNAL MEDICINE

## 2021-07-26 PROCEDURE — 99233 SBSQ HOSP IP/OBS HIGH 50: CPT | Performed by: INTERNAL MEDICINE

## 2021-07-26 PROCEDURE — 94660 CPAP INITIATION&MGMT: CPT

## 2021-07-26 PROCEDURE — 97535 SELF CARE MNGMENT TRAINING: CPT

## 2021-07-26 PROCEDURE — 63710000001 INSULIN LISPRO (HUMAN) PER 5 UNITS: Performed by: INTERNAL MEDICINE

## 2021-07-26 PROCEDURE — 82962 GLUCOSE BLOOD TEST: CPT

## 2021-07-26 RX ORDER — DOBUTAMINE HYDROCHLORIDE 100 MG/100ML
5 INJECTION INTRAVENOUS
Status: DISCONTINUED | OUTPATIENT
Start: 2021-07-26 | End: 2021-08-03 | Stop reason: HOSPADM

## 2021-07-26 RX ADMIN — FUROSEMIDE 80 MG: 40 TABLET ORAL at 17:44

## 2021-07-26 RX ADMIN — HYDRALAZINE HYDROCHLORIDE 50 MG: 25 TABLET, FILM COATED ORAL at 08:21

## 2021-07-26 RX ADMIN — DOBUTAMINE IN DEXTROSE 5 MCG/KG/MIN: 100 INJECTION, SOLUTION INTRAVENOUS at 16:37

## 2021-07-26 RX ADMIN — CLOPIDOGREL BISULFATE 75 MG: 75 TABLET ORAL at 08:21

## 2021-07-26 RX ADMIN — ISOSORBIDE MONONITRATE 60 MG: 60 TABLET, EXTENDED RELEASE ORAL at 08:21

## 2021-07-26 RX ADMIN — ATORVASTATIN CALCIUM 40 MG: 40 TABLET, FILM COATED ORAL at 20:25

## 2021-07-26 RX ADMIN — INSULIN HUMAN 30 UNITS: 100 INJECTION, SUSPENSION SUBCUTANEOUS at 17:44

## 2021-07-26 RX ADMIN — Medication 10 ML: at 08:21

## 2021-07-26 RX ADMIN — FUROSEMIDE 80 MG: 40 TABLET ORAL at 08:21

## 2021-07-26 RX ADMIN — GABAPENTIN 200 MG: 100 CAPSULE ORAL at 08:21

## 2021-07-26 RX ADMIN — Medication 1000 UNITS: at 08:26

## 2021-07-26 RX ADMIN — INSULIN HUMAN 30 UNITS: 100 INJECTION, SUSPENSION SUBCUTANEOUS at 08:21

## 2021-07-26 RX ADMIN — ASPIRIN 81 MG CHEWABLE TABLET 81 MG: 81 TABLET CHEWABLE at 08:21

## 2021-07-26 RX ADMIN — DULOXETINE 60 MG: 30 CAPSULE, DELAYED RELEASE ORAL at 08:21

## 2021-07-26 RX ADMIN — ISOSORBIDE MONONITRATE 60 MG: 60 TABLET, EXTENDED RELEASE ORAL at 17:44

## 2021-07-26 RX ADMIN — APIXABAN 5 MG: 5 TABLET, FILM COATED ORAL at 20:25

## 2021-07-26 RX ADMIN — DONEPEZIL HYDROCHLORIDE 10 MG: 5 TABLET, FILM COATED ORAL at 20:25

## 2021-07-26 RX ADMIN — DOBUTAMINE IN DEXTROSE 5 MCG/KG/MIN: 100 INJECTION, SOLUTION INTRAVENOUS at 23:30

## 2021-07-26 RX ADMIN — APIXABAN 5 MG: 5 TABLET, FILM COATED ORAL at 08:21

## 2021-07-26 RX ADMIN — GABAPENTIN 200 MG: 100 CAPSULE ORAL at 20:25

## 2021-07-26 RX ADMIN — CYANOCOBALAMIN TAB 1000 MCG 1000 MCG: 1000 TAB at 08:21

## 2021-07-26 RX ADMIN — Medication 10 ML: at 20:44

## 2021-07-26 RX ADMIN — INSULIN LISPRO 2 UNITS: 100 INJECTION, SOLUTION INTRAVENOUS; SUBCUTANEOUS at 12:34

## 2021-07-27 LAB
ANION GAP SERPL CALCULATED.3IONS-SCNC: 10 MMOL/L (ref 5–15)
BUN SERPL-MCNC: 49 MG/DL (ref 8–23)
BUN/CREAT SERPL: 21.6 (ref 7–25)
CALCIUM SPEC-SCNC: 8.2 MG/DL (ref 8.6–10.5)
CHLORIDE SERPL-SCNC: 96 MMOL/L (ref 98–107)
CO2 SERPL-SCNC: 31 MMOL/L (ref 22–29)
CREAT SERPL-MCNC: 2.27 MG/DL (ref 0.76–1.27)
DEPRECATED RDW RBC AUTO: 47.3 FL (ref 37–54)
ERYTHROCYTE [DISTWIDTH] IN BLOOD BY AUTOMATED COUNT: 15.3 % (ref 12.3–15.4)
GFR SERPL CREATININE-BSD FRML MDRD: 29 ML/MIN/1.73
GLUCOSE BLDC GLUCOMTR-MCNC: 126 MG/DL (ref 70–105)
GLUCOSE BLDC GLUCOMTR-MCNC: 127 MG/DL (ref 70–105)
GLUCOSE BLDC GLUCOMTR-MCNC: 194 MG/DL (ref 70–105)
GLUCOSE BLDC GLUCOMTR-MCNC: 217 MG/DL (ref 70–105)
GLUCOSE SERPL-MCNC: 126 MG/DL (ref 65–99)
HCT VFR BLD AUTO: 23.9 % (ref 37.5–51)
HGB BLD-MCNC: 7.7 G/DL (ref 13–17.7)
MAGNESIUM SERPL-MCNC: 2.4 MG/DL (ref 1.6–2.4)
MCH RBC QN AUTO: 28.2 PG (ref 26.6–33)
MCHC RBC AUTO-ENTMCNC: 32.3 G/DL (ref 31.5–35.7)
MCV RBC AUTO: 87.3 FL (ref 79–97)
NT-PROBNP SERPL-MCNC: 7712 PG/ML (ref 0–900)
PLATELET # BLD AUTO: 269 10*3/MM3 (ref 140–450)
PMV BLD AUTO: 7.7 FL (ref 6–12)
POTASSIUM SERPL-SCNC: 4 MMOL/L (ref 3.5–5.2)
RBC # BLD AUTO: 2.74 10*6/MM3 (ref 4.14–5.8)
SODIUM SERPL-SCNC: 137 MMOL/L (ref 136–145)
WBC # BLD AUTO: 6.9 10*3/MM3 (ref 3.4–10.8)

## 2021-07-27 PROCEDURE — 99233 SBSQ HOSP IP/OBS HIGH 50: CPT | Performed by: INTERNAL MEDICINE

## 2021-07-27 PROCEDURE — 63710000001 INSULIN ISOPHANE & REGULAR PER 5 UNITS: Performed by: INTERNAL MEDICINE

## 2021-07-27 PROCEDURE — 97530 THERAPEUTIC ACTIVITIES: CPT

## 2021-07-27 PROCEDURE — 25010000002 DOBUTAMINE PER 250 MG: Performed by: INTERNAL MEDICINE

## 2021-07-27 PROCEDURE — 83735 ASSAY OF MAGNESIUM: CPT | Performed by: INTERNAL MEDICINE

## 2021-07-27 PROCEDURE — 97116 GAIT TRAINING THERAPY: CPT

## 2021-07-27 PROCEDURE — 83880 ASSAY OF NATRIURETIC PEPTIDE: CPT | Performed by: INTERNAL MEDICINE

## 2021-07-27 PROCEDURE — 85027 COMPLETE CBC AUTOMATED: CPT | Performed by: INTERNAL MEDICINE

## 2021-07-27 PROCEDURE — 80048 BASIC METABOLIC PNL TOTAL CA: CPT | Performed by: INTERNAL MEDICINE

## 2021-07-27 PROCEDURE — 63710000001 INSULIN LISPRO (HUMAN) PER 5 UNITS: Performed by: INTERNAL MEDICINE

## 2021-07-27 PROCEDURE — 82962 GLUCOSE BLOOD TEST: CPT

## 2021-07-27 RX ADMIN — ATORVASTATIN CALCIUM 40 MG: 40 TABLET, FILM COATED ORAL at 20:55

## 2021-07-27 RX ADMIN — ISOSORBIDE MONONITRATE 60 MG: 60 TABLET, EXTENDED RELEASE ORAL at 17:23

## 2021-07-27 RX ADMIN — ASPIRIN 81 MG CHEWABLE TABLET 81 MG: 81 TABLET CHEWABLE at 08:24

## 2021-07-27 RX ADMIN — DOBUTAMINE IN DEXTROSE 5 MCG/KG/MIN: 100 INJECTION, SOLUTION INTRAVENOUS at 12:21

## 2021-07-27 RX ADMIN — GABAPENTIN 200 MG: 100 CAPSULE ORAL at 08:25

## 2021-07-27 RX ADMIN — Medication 10 ML: at 08:25

## 2021-07-27 RX ADMIN — FUROSEMIDE 80 MG: 40 TABLET ORAL at 17:23

## 2021-07-27 RX ADMIN — APIXABAN 5 MG: 5 TABLET, FILM COATED ORAL at 08:24

## 2021-07-27 RX ADMIN — Medication 1000 UNITS: at 08:25

## 2021-07-27 RX ADMIN — DONEPEZIL HYDROCHLORIDE 10 MG: 5 TABLET, FILM COATED ORAL at 20:55

## 2021-07-27 RX ADMIN — APIXABAN 5 MG: 5 TABLET, FILM COATED ORAL at 20:55

## 2021-07-27 RX ADMIN — DULOXETINE 60 MG: 30 CAPSULE, DELAYED RELEASE ORAL at 08:25

## 2021-07-27 RX ADMIN — FUROSEMIDE 80 MG: 40 TABLET ORAL at 08:24

## 2021-07-27 RX ADMIN — INSULIN LISPRO 2 UNITS: 100 INJECTION, SOLUTION INTRAVENOUS; SUBCUTANEOUS at 12:21

## 2021-07-27 RX ADMIN — ISOSORBIDE MONONITRATE 60 MG: 60 TABLET, EXTENDED RELEASE ORAL at 08:25

## 2021-07-27 RX ADMIN — INSULIN HUMAN 30 UNITS: 100 INJECTION, SUSPENSION SUBCUTANEOUS at 17:23

## 2021-07-27 RX ADMIN — Medication 10 ML: at 20:55

## 2021-07-27 RX ADMIN — CYANOCOBALAMIN TAB 1000 MCG 1000 MCG: 1000 TAB at 08:24

## 2021-07-27 RX ADMIN — DOBUTAMINE IN DEXTROSE 5 MCG/KG/MIN: 100 INJECTION, SOLUTION INTRAVENOUS at 05:29

## 2021-07-27 RX ADMIN — INSULIN LISPRO 3 UNITS: 100 INJECTION, SOLUTION INTRAVENOUS; SUBCUTANEOUS at 17:23

## 2021-07-27 RX ADMIN — CLOPIDOGREL BISULFATE 75 MG: 75 TABLET ORAL at 08:25

## 2021-07-27 RX ADMIN — GABAPENTIN 200 MG: 100 CAPSULE ORAL at 20:55

## 2021-07-27 RX ADMIN — DOBUTAMINE IN DEXTROSE 5 MCG/KG/MIN: 100 INJECTION, SOLUTION INTRAVENOUS at 21:04

## 2021-07-27 RX ADMIN — INSULIN HUMAN 30 UNITS: 100 INJECTION, SUSPENSION SUBCUTANEOUS at 08:25

## 2021-07-27 RX ADMIN — HYDRALAZINE HYDROCHLORIDE 50 MG: 25 TABLET, FILM COATED ORAL at 20:55

## 2021-07-28 LAB
ANION GAP SERPL CALCULATED.3IONS-SCNC: 9 MMOL/L (ref 5–15)
BACTERIA UR QL AUTO: ABNORMAL /HPF
BILIRUB UR QL STRIP: NEGATIVE
BUN SERPL-MCNC: 49 MG/DL (ref 8–23)
BUN/CREAT SERPL: 20.9 (ref 7–25)
CALCIUM SPEC-SCNC: 8.2 MG/DL (ref 8.6–10.5)
CHLORIDE SERPL-SCNC: 96 MMOL/L (ref 98–107)
CLARITY UR: ABNORMAL
CO2 SERPL-SCNC: 32 MMOL/L (ref 22–29)
COLOR UR: YELLOW
CREAT SERPL-MCNC: 2.34 MG/DL (ref 0.76–1.27)
CREAT UR-MCNC: 44.3 MG/DL
EOSINOPHIL SPEC QL MICRO: 0 % EOS/100 CELLS (ref 0–0)
GFR SERPL CREATININE-BSD FRML MDRD: 28 ML/MIN/1.73
GLUCOSE BLDC GLUCOMTR-MCNC: 124 MG/DL (ref 70–105)
GLUCOSE BLDC GLUCOMTR-MCNC: 147 MG/DL (ref 70–105)
GLUCOSE BLDC GLUCOMTR-MCNC: 180 MG/DL (ref 70–105)
GLUCOSE BLDC GLUCOMTR-MCNC: 189 MG/DL (ref 70–105)
GLUCOSE SERPL-MCNC: 76 MG/DL (ref 65–99)
GLUCOSE UR STRIP-MCNC: NEGATIVE MG/DL
HGB UR QL STRIP.AUTO: NEGATIVE
HYALINE CASTS UR QL AUTO: ABNORMAL /LPF
KETONES UR QL STRIP: NEGATIVE
LEUKOCYTE ESTERASE UR QL STRIP.AUTO: ABNORMAL
NITRITE UR QL STRIP: NEGATIVE
PH UR STRIP.AUTO: 5.5 [PH] (ref 5–8)
POTASSIUM SERPL-SCNC: 3.9 MMOL/L (ref 3.5–5.2)
PROT UR QL STRIP: ABNORMAL
PROT UR-MCNC: 38 MG/DL
PROT/CREAT UR: 857.8 MG/G CREA (ref 0–200)
RBC # UR: ABNORMAL /HPF
REF LAB TEST METHOD: ABNORMAL
SODIUM SERPL-SCNC: 137 MMOL/L (ref 136–145)
SODIUM UR-SCNC: 22 MMOL/L
SP GR UR STRIP: 1.01 (ref 1–1.03)
SQUAMOUS #/AREA URNS HPF: ABNORMAL /HPF
UROBILINOGEN UR QL STRIP: ABNORMAL
WBC UR QL AUTO: ABNORMAL /HPF

## 2021-07-28 PROCEDURE — 84300 ASSAY OF URINE SODIUM: CPT | Performed by: INTERNAL MEDICINE

## 2021-07-28 PROCEDURE — 63710000001 INSULIN LISPRO (HUMAN) PER 5 UNITS: Performed by: INTERNAL MEDICINE

## 2021-07-28 PROCEDURE — 82570 ASSAY OF URINE CREATININE: CPT | Performed by: INTERNAL MEDICINE

## 2021-07-28 PROCEDURE — 82962 GLUCOSE BLOOD TEST: CPT

## 2021-07-28 PROCEDURE — 99233 SBSQ HOSP IP/OBS HIGH 50: CPT | Performed by: INTERNAL MEDICINE

## 2021-07-28 PROCEDURE — 97535 SELF CARE MNGMENT TRAINING: CPT

## 2021-07-28 PROCEDURE — 63710000001 INSULIN ISOPHANE & REGULAR PER 5 UNITS: Performed by: INTERNAL MEDICINE

## 2021-07-28 PROCEDURE — 81001 URINALYSIS AUTO W/SCOPE: CPT | Performed by: INTERNAL MEDICINE

## 2021-07-28 PROCEDURE — 87205 SMEAR GRAM STAIN: CPT | Performed by: INTERNAL MEDICINE

## 2021-07-28 PROCEDURE — 97530 THERAPEUTIC ACTIVITIES: CPT

## 2021-07-28 PROCEDURE — 25010000002 DOBUTAMINE PER 250 MG: Performed by: INTERNAL MEDICINE

## 2021-07-28 PROCEDURE — 84156 ASSAY OF PROTEIN URINE: CPT | Performed by: INTERNAL MEDICINE

## 2021-07-28 PROCEDURE — 80048 BASIC METABOLIC PNL TOTAL CA: CPT | Performed by: INTERNAL MEDICINE

## 2021-07-28 RX ADMIN — Medication 10 ML: at 20:26

## 2021-07-28 RX ADMIN — GABAPENTIN 200 MG: 100 CAPSULE ORAL at 20:26

## 2021-07-28 RX ADMIN — APIXABAN 5 MG: 5 TABLET, FILM COATED ORAL at 20:26

## 2021-07-28 RX ADMIN — INSULIN HUMAN 30 UNITS: 100 INJECTION, SUSPENSION SUBCUTANEOUS at 08:30

## 2021-07-28 RX ADMIN — ISOSORBIDE MONONITRATE 60 MG: 60 TABLET, EXTENDED RELEASE ORAL at 17:03

## 2021-07-28 RX ADMIN — DONEPEZIL HYDROCHLORIDE 10 MG: 5 TABLET, FILM COATED ORAL at 20:26

## 2021-07-28 RX ADMIN — Medication 1000 UNITS: at 08:38

## 2021-07-28 RX ADMIN — INSULIN LISPRO 2 UNITS: 100 INJECTION, SOLUTION INTRAVENOUS; SUBCUTANEOUS at 17:04

## 2021-07-28 RX ADMIN — DOBUTAMINE IN DEXTROSE 5 MCG/KG/MIN: 100 INJECTION, SOLUTION INTRAVENOUS at 04:21

## 2021-07-28 RX ADMIN — DOBUTAMINE IN DEXTROSE 5 MCG/KG/MIN: 100 INJECTION, SOLUTION INTRAVENOUS at 12:42

## 2021-07-28 RX ADMIN — INSULIN LISPRO 2 UNITS: 100 INJECTION, SOLUTION INTRAVENOUS; SUBCUTANEOUS at 11:53

## 2021-07-28 RX ADMIN — APIXABAN 5 MG: 5 TABLET, FILM COATED ORAL at 08:38

## 2021-07-28 RX ADMIN — HYDRALAZINE HYDROCHLORIDE 50 MG: 25 TABLET, FILM COATED ORAL at 20:26

## 2021-07-28 RX ADMIN — ASPIRIN 81 MG CHEWABLE TABLET 81 MG: 81 TABLET CHEWABLE at 08:38

## 2021-07-28 RX ADMIN — FUROSEMIDE 80 MG: 40 TABLET ORAL at 17:03

## 2021-07-28 RX ADMIN — DOBUTAMINE IN DEXTROSE 5 MCG/KG/MIN: 100 INJECTION, SOLUTION INTRAVENOUS at 21:09

## 2021-07-28 RX ADMIN — CLOPIDOGREL BISULFATE 75 MG: 75 TABLET ORAL at 08:38

## 2021-07-28 RX ADMIN — DULOXETINE 60 MG: 30 CAPSULE, DELAYED RELEASE ORAL at 08:38

## 2021-07-28 RX ADMIN — FUROSEMIDE 80 MG: 40 TABLET ORAL at 08:38

## 2021-07-28 RX ADMIN — ISOSORBIDE MONONITRATE 60 MG: 60 TABLET, EXTENDED RELEASE ORAL at 08:38

## 2021-07-28 RX ADMIN — GABAPENTIN 200 MG: 100 CAPSULE ORAL at 08:38

## 2021-07-28 RX ADMIN — INSULIN HUMAN 30 UNITS: 100 INJECTION, SUSPENSION SUBCUTANEOUS at 17:03

## 2021-07-28 RX ADMIN — CYANOCOBALAMIN TAB 1000 MCG 1000 MCG: 1000 TAB at 08:38

## 2021-07-28 RX ADMIN — Medication 10 ML: at 08:38

## 2021-07-28 RX ADMIN — ATORVASTATIN CALCIUM 40 MG: 40 TABLET, FILM COATED ORAL at 20:26

## 2021-07-29 LAB
ANION GAP SERPL CALCULATED.3IONS-SCNC: 8 MMOL/L (ref 5–15)
BUN SERPL-MCNC: 50 MG/DL (ref 8–23)
BUN/CREAT SERPL: 24.2 (ref 7–25)
CALCIUM SPEC-SCNC: 8.5 MG/DL (ref 8.6–10.5)
CHLORIDE SERPL-SCNC: 98 MMOL/L (ref 98–107)
CO2 SERPL-SCNC: 32 MMOL/L (ref 22–29)
CREAT SERPL-MCNC: 2.07 MG/DL (ref 0.76–1.27)
GFR SERPL CREATININE-BSD FRML MDRD: 32 ML/MIN/1.73
GLUCOSE BLDC GLUCOMTR-MCNC: 115 MG/DL (ref 70–105)
GLUCOSE BLDC GLUCOMTR-MCNC: 131 MG/DL (ref 70–105)
GLUCOSE BLDC GLUCOMTR-MCNC: 177 MG/DL (ref 70–105)
GLUCOSE BLDC GLUCOMTR-MCNC: 62 MG/DL (ref 70–105)
GLUCOSE BLDC GLUCOMTR-MCNC: 62 MG/DL (ref 70–105)
GLUCOSE BLDC GLUCOMTR-MCNC: 83 MG/DL (ref 70–105)
GLUCOSE SERPL-MCNC: 89 MG/DL (ref 65–99)
POTASSIUM SERPL-SCNC: 4.1 MMOL/L (ref 3.5–5.2)
SODIUM SERPL-SCNC: 138 MMOL/L (ref 136–145)

## 2021-07-29 PROCEDURE — 25010000002 DOBUTAMINE PER 250 MG: Performed by: INTERNAL MEDICINE

## 2021-07-29 PROCEDURE — 99233 SBSQ HOSP IP/OBS HIGH 50: CPT | Performed by: INTERNAL MEDICINE

## 2021-07-29 PROCEDURE — 82962 GLUCOSE BLOOD TEST: CPT

## 2021-07-29 PROCEDURE — 63710000001 INSULIN ISOPHANE & REGULAR PER 5 UNITS: Performed by: INTERNAL MEDICINE

## 2021-07-29 PROCEDURE — 97116 GAIT TRAINING THERAPY: CPT

## 2021-07-29 PROCEDURE — 25010000002 CEFTRIAXONE PER 250 MG: Performed by: INTERNAL MEDICINE

## 2021-07-29 PROCEDURE — 80048 BASIC METABOLIC PNL TOTAL CA: CPT | Performed by: INTERNAL MEDICINE

## 2021-07-29 PROCEDURE — 63710000001 INSULIN LISPRO (HUMAN) PER 5 UNITS: Performed by: INTERNAL MEDICINE

## 2021-07-29 RX ORDER — FUROSEMIDE 40 MG/1
40 TABLET ORAL
Status: DISCONTINUED | OUTPATIENT
Start: 2021-07-29 | End: 2021-08-03 | Stop reason: HOSPADM

## 2021-07-29 RX ORDER — SPIRONOLACTONE 25 MG/1
25 TABLET ORAL DAILY
Status: DISCONTINUED | OUTPATIENT
Start: 2021-07-29 | End: 2021-08-03 | Stop reason: HOSPADM

## 2021-07-29 RX ADMIN — Medication 1000 UNITS: at 08:36

## 2021-07-29 RX ADMIN — DONEPEZIL HYDROCHLORIDE 10 MG: 5 TABLET, FILM COATED ORAL at 20:50

## 2021-07-29 RX ADMIN — Medication 10 ML: at 20:51

## 2021-07-29 RX ADMIN — CYANOCOBALAMIN TAB 1000 MCG 1000 MCG: 1000 TAB at 08:36

## 2021-07-29 RX ADMIN — APIXABAN 5 MG: 5 TABLET, FILM COATED ORAL at 20:51

## 2021-07-29 RX ADMIN — INSULIN HUMAN 30 UNITS: 100 INJECTION, SUSPENSION SUBCUTANEOUS at 08:57

## 2021-07-29 RX ADMIN — SPIRONOLACTONE 25 MG: 25 TABLET ORAL at 08:36

## 2021-07-29 RX ADMIN — INSULIN HUMAN 30 UNITS: 100 INJECTION, SUSPENSION SUBCUTANEOUS at 18:13

## 2021-07-29 RX ADMIN — FUROSEMIDE 80 MG: 40 TABLET ORAL at 08:39

## 2021-07-29 RX ADMIN — GABAPENTIN 200 MG: 100 CAPSULE ORAL at 08:36

## 2021-07-29 RX ADMIN — GABAPENTIN 200 MG: 100 CAPSULE ORAL at 20:51

## 2021-07-29 RX ADMIN — ISOSORBIDE MONONITRATE 60 MG: 60 TABLET, EXTENDED RELEASE ORAL at 18:14

## 2021-07-29 RX ADMIN — ATORVASTATIN CALCIUM 40 MG: 40 TABLET, FILM COATED ORAL at 20:50

## 2021-07-29 RX ADMIN — ASPIRIN 81 MG CHEWABLE TABLET 81 MG: 81 TABLET CHEWABLE at 08:36

## 2021-07-29 RX ADMIN — Medication 10 ML: at 08:37

## 2021-07-29 RX ADMIN — DOBUTAMINE IN DEXTROSE 5 MCG/KG/MIN: 100 INJECTION, SOLUTION INTRAVENOUS at 05:44

## 2021-07-29 RX ADMIN — HYDRALAZINE HYDROCHLORIDE 50 MG: 25 TABLET, FILM COATED ORAL at 20:51

## 2021-07-29 RX ADMIN — INSULIN LISPRO 2 UNITS: 100 INJECTION, SOLUTION INTRAVENOUS; SUBCUTANEOUS at 13:35

## 2021-07-29 RX ADMIN — FUROSEMIDE 40 MG: 40 TABLET ORAL at 18:13

## 2021-07-29 RX ADMIN — DULOXETINE 60 MG: 30 CAPSULE, DELAYED RELEASE ORAL at 08:37

## 2021-07-29 RX ADMIN — CLOPIDOGREL BISULFATE 75 MG: 75 TABLET ORAL at 08:36

## 2021-07-29 RX ADMIN — APIXABAN 5 MG: 5 TABLET, FILM COATED ORAL at 08:36

## 2021-07-29 RX ADMIN — HYDROCODONE BITARTRATE AND ACETAMINOPHEN 1 TABLET: 7.5; 325 TABLET ORAL at 12:13

## 2021-07-29 RX ADMIN — HYDRALAZINE HYDROCHLORIDE 50 MG: 25 TABLET, FILM COATED ORAL at 08:37

## 2021-07-29 RX ADMIN — CEFTRIAXONE SODIUM 1 G: 1 INJECTION, POWDER, FOR SOLUTION INTRAMUSCULAR; INTRAVENOUS at 16:15

## 2021-07-29 RX ADMIN — ISOSORBIDE MONONITRATE 60 MG: 60 TABLET, EXTENDED RELEASE ORAL at 08:36

## 2021-07-30 LAB
ANION GAP SERPL CALCULATED.3IONS-SCNC: 10 MMOL/L (ref 5–15)
BUN SERPL-MCNC: 52 MG/DL (ref 8–23)
BUN/CREAT SERPL: 26.3 (ref 7–25)
CALCIUM SPEC-SCNC: 8.5 MG/DL (ref 8.6–10.5)
CHLORIDE SERPL-SCNC: 97 MMOL/L (ref 98–107)
CO2 SERPL-SCNC: 31 MMOL/L (ref 22–29)
CREAT SERPL-MCNC: 1.98 MG/DL (ref 0.76–1.27)
GFR SERPL CREATININE-BSD FRML MDRD: 34 ML/MIN/1.73
GLUCOSE BLDC GLUCOMTR-MCNC: 145 MG/DL (ref 70–105)
GLUCOSE BLDC GLUCOMTR-MCNC: 148 MG/DL (ref 70–105)
GLUCOSE BLDC GLUCOMTR-MCNC: 83 MG/DL (ref 70–105)
GLUCOSE BLDC GLUCOMTR-MCNC: 90 MG/DL (ref 70–105)
GLUCOSE SERPL-MCNC: 93 MG/DL (ref 65–99)
MAGNESIUM SERPL-MCNC: 2.2 MG/DL (ref 1.6–2.4)
POTASSIUM SERPL-SCNC: 4.4 MMOL/L (ref 3.5–5.2)
SODIUM SERPL-SCNC: 138 MMOL/L (ref 136–145)

## 2021-07-30 PROCEDURE — 80048 BASIC METABOLIC PNL TOTAL CA: CPT | Performed by: INTERNAL MEDICINE

## 2021-07-30 PROCEDURE — 99233 SBSQ HOSP IP/OBS HIGH 50: CPT | Performed by: INTERNAL MEDICINE

## 2021-07-30 PROCEDURE — 63710000001 INSULIN ISOPHANE & REGULAR PER 5 UNITS: Performed by: INTERNAL MEDICINE

## 2021-07-30 PROCEDURE — 82962 GLUCOSE BLOOD TEST: CPT

## 2021-07-30 PROCEDURE — 83735 ASSAY OF MAGNESIUM: CPT | Performed by: INTERNAL MEDICINE

## 2021-07-30 RX ADMIN — ISOSORBIDE MONONITRATE 60 MG: 60 TABLET, EXTENDED RELEASE ORAL at 08:00

## 2021-07-30 RX ADMIN — GABAPENTIN 200 MG: 100 CAPSULE ORAL at 08:00

## 2021-07-30 RX ADMIN — ASPIRIN 81 MG CHEWABLE TABLET 81 MG: 81 TABLET CHEWABLE at 08:00

## 2021-07-30 RX ADMIN — DONEPEZIL HYDROCHLORIDE 10 MG: 5 TABLET, FILM COATED ORAL at 20:52

## 2021-07-30 RX ADMIN — INSULIN HUMAN 30 UNITS: 100 INJECTION, SUSPENSION SUBCUTANEOUS at 07:59

## 2021-07-30 RX ADMIN — DULOXETINE 60 MG: 30 CAPSULE, DELAYED RELEASE ORAL at 08:00

## 2021-07-30 RX ADMIN — INSULIN HUMAN 30 UNITS: 100 INJECTION, SUSPENSION SUBCUTANEOUS at 18:05

## 2021-07-30 RX ADMIN — HYDRALAZINE HYDROCHLORIDE 50 MG: 25 TABLET, FILM COATED ORAL at 08:00

## 2021-07-30 RX ADMIN — ISOSORBIDE MONONITRATE 60 MG: 60 TABLET, EXTENDED RELEASE ORAL at 18:06

## 2021-07-30 RX ADMIN — FUROSEMIDE 40 MG: 40 TABLET ORAL at 08:00

## 2021-07-30 RX ADMIN — SPIRONOLACTONE 25 MG: 25 TABLET ORAL at 08:00

## 2021-07-30 RX ADMIN — CYANOCOBALAMIN TAB 1000 MCG 1000 MCG: 1000 TAB at 08:00

## 2021-07-30 RX ADMIN — Medication 10 ML: at 08:01

## 2021-07-30 RX ADMIN — APIXABAN 5 MG: 5 TABLET, FILM COATED ORAL at 08:00

## 2021-07-30 RX ADMIN — APIXABAN 5 MG: 5 TABLET, FILM COATED ORAL at 20:52

## 2021-07-30 RX ADMIN — CLOPIDOGREL BISULFATE 75 MG: 75 TABLET ORAL at 08:00

## 2021-07-30 RX ADMIN — HYDRALAZINE HYDROCHLORIDE 50 MG: 25 TABLET, FILM COATED ORAL at 20:52

## 2021-07-30 RX ADMIN — ATORVASTATIN CALCIUM 40 MG: 40 TABLET, FILM COATED ORAL at 20:52

## 2021-07-30 RX ADMIN — FUROSEMIDE 40 MG: 40 TABLET ORAL at 18:07

## 2021-07-30 RX ADMIN — GABAPENTIN 200 MG: 100 CAPSULE ORAL at 20:52

## 2021-07-30 RX ADMIN — Medication 10 ML: at 20:52

## 2021-07-30 RX ADMIN — Medication 1000 UNITS: at 08:00

## 2021-07-31 LAB
ANION GAP SERPL CALCULATED.3IONS-SCNC: 8 MMOL/L (ref 5–15)
BASOPHILS # BLD AUTO: 0 10*3/MM3 (ref 0–0.2)
BASOPHILS NFR BLD AUTO: 0.3 % (ref 0–1.5)
BUN SERPL-MCNC: 51 MG/DL (ref 8–23)
BUN/CREAT SERPL: 27.4 (ref 7–25)
CALCIUM SPEC-SCNC: 9 MG/DL (ref 8.6–10.5)
CHLORIDE SERPL-SCNC: 100 MMOL/L (ref 98–107)
CO2 SERPL-SCNC: 31 MMOL/L (ref 22–29)
CREAT SERPL-MCNC: 1.86 MG/DL (ref 0.76–1.27)
DEPRECATED RDW RBC AUTO: 46.8 FL (ref 37–54)
EOSINOPHIL # BLD AUTO: 0.6 10*3/MM3 (ref 0–0.4)
EOSINOPHIL NFR BLD AUTO: 9.4 % (ref 0.3–6.2)
ERYTHROCYTE [DISTWIDTH] IN BLOOD BY AUTOMATED COUNT: 15.4 % (ref 12.3–15.4)
GFR SERPL CREATININE-BSD FRML MDRD: 36 ML/MIN/1.73
GLUCOSE BLDC GLUCOMTR-MCNC: 114 MG/DL (ref 70–105)
GLUCOSE BLDC GLUCOMTR-MCNC: 168 MG/DL (ref 70–105)
GLUCOSE BLDC GLUCOMTR-MCNC: 171 MG/DL (ref 70–105)
GLUCOSE BLDC GLUCOMTR-MCNC: 93 MG/DL (ref 70–105)
GLUCOSE SERPL-MCNC: 99 MG/DL (ref 65–99)
HCT VFR BLD AUTO: 23.8 % (ref 37.5–51)
HGB BLD-MCNC: 7.7 G/DL (ref 13–17.7)
LYMPHOCYTES # BLD AUTO: 1.4 10*3/MM3 (ref 0.7–3.1)
LYMPHOCYTES NFR BLD AUTO: 23.4 % (ref 19.6–45.3)
MCH RBC QN AUTO: 27.9 PG (ref 26.6–33)
MCHC RBC AUTO-ENTMCNC: 32.3 G/DL (ref 31.5–35.7)
MCV RBC AUTO: 86.3 FL (ref 79–97)
MONOCYTES # BLD AUTO: 0.7 10*3/MM3 (ref 0.1–0.9)
MONOCYTES NFR BLD AUTO: 12.2 % (ref 5–12)
NEUTROPHILS NFR BLD AUTO: 3.3 10*3/MM3 (ref 1.7–7)
NEUTROPHILS NFR BLD AUTO: 54.7 % (ref 42.7–76)
NRBC BLD AUTO-RTO: 0.1 /100 WBC (ref 0–0.2)
PLATELET # BLD AUTO: 247 10*3/MM3 (ref 140–450)
PMV BLD AUTO: 8.1 FL (ref 6–12)
POTASSIUM SERPL-SCNC: 4.6 MMOL/L (ref 3.5–5.2)
RBC # BLD AUTO: 2.76 10*6/MM3 (ref 4.14–5.8)
SODIUM SERPL-SCNC: 139 MMOL/L (ref 136–145)
WBC # BLD AUTO: 6 10*3/MM3 (ref 3.4–10.8)

## 2021-07-31 PROCEDURE — 63710000001 INSULIN ISOPHANE & REGULAR PER 5 UNITS: Performed by: INTERNAL MEDICINE

## 2021-07-31 PROCEDURE — 82962 GLUCOSE BLOOD TEST: CPT

## 2021-07-31 PROCEDURE — 85025 COMPLETE CBC W/AUTO DIFF WBC: CPT | Performed by: INTERNAL MEDICINE

## 2021-07-31 PROCEDURE — 80048 BASIC METABOLIC PNL TOTAL CA: CPT | Performed by: INTERNAL MEDICINE

## 2021-07-31 PROCEDURE — 63710000001 INSULIN LISPRO (HUMAN) PER 5 UNITS: Performed by: INTERNAL MEDICINE

## 2021-07-31 PROCEDURE — 99233 SBSQ HOSP IP/OBS HIGH 50: CPT | Performed by: INTERNAL MEDICINE

## 2021-07-31 RX ORDER — METOPROLOL SUCCINATE 50 MG/1
100 TABLET, EXTENDED RELEASE ORAL
Status: DISCONTINUED | OUTPATIENT
Start: 2021-07-31 | End: 2021-08-03 | Stop reason: HOSPADM

## 2021-07-31 RX ORDER — DILTIAZEM HYDROCHLORIDE 5 MG/ML
10 INJECTION INTRAVENOUS ONCE
Status: COMPLETED | OUTPATIENT
Start: 2021-07-31 | End: 2021-07-31

## 2021-07-31 RX ADMIN — CLOPIDOGREL BISULFATE 75 MG: 75 TABLET ORAL at 09:41

## 2021-07-31 RX ADMIN — SODIUM CHLORIDE 5 MG/HR: 900 INJECTION, SOLUTION INTRAVENOUS at 23:49

## 2021-07-31 RX ADMIN — GABAPENTIN 200 MG: 100 CAPSULE ORAL at 21:25

## 2021-07-31 RX ADMIN — INSULIN HUMAN 30 UNITS: 100 INJECTION, SUSPENSION SUBCUTANEOUS at 17:31

## 2021-07-31 RX ADMIN — CYANOCOBALAMIN TAB 1000 MCG 1000 MCG: 1000 TAB at 09:40

## 2021-07-31 RX ADMIN — Medication 10 ML: at 10:27

## 2021-07-31 RX ADMIN — FUROSEMIDE 40 MG: 40 TABLET ORAL at 17:30

## 2021-07-31 RX ADMIN — ASPIRIN 81 MG CHEWABLE TABLET 81 MG: 81 TABLET CHEWABLE at 09:40

## 2021-07-31 RX ADMIN — INSULIN HUMAN 30 UNITS: 100 INJECTION, SUSPENSION SUBCUTANEOUS at 08:00

## 2021-07-31 RX ADMIN — HYDRALAZINE HYDROCHLORIDE 50 MG: 25 TABLET, FILM COATED ORAL at 09:40

## 2021-07-31 RX ADMIN — METOPROLOL SUCCINATE 100 MG: 50 TABLET, EXTENDED RELEASE ORAL at 21:25

## 2021-07-31 RX ADMIN — DULOXETINE 60 MG: 30 CAPSULE, DELAYED RELEASE ORAL at 09:40

## 2021-07-31 RX ADMIN — INSULIN LISPRO 2 UNITS: 100 INJECTION, SOLUTION INTRAVENOUS; SUBCUTANEOUS at 17:30

## 2021-07-31 RX ADMIN — DILTIAZEM HYDROCHLORIDE 10 MG: 5 INJECTION INTRAVENOUS at 07:22

## 2021-07-31 RX ADMIN — FUROSEMIDE 40 MG: 40 TABLET ORAL at 09:40

## 2021-07-31 RX ADMIN — ISOSORBIDE MONONITRATE 60 MG: 60 TABLET, EXTENDED RELEASE ORAL at 17:30

## 2021-07-31 RX ADMIN — INSULIN LISPRO 2 UNITS: 100 INJECTION, SOLUTION INTRAVENOUS; SUBCUTANEOUS at 13:16

## 2021-07-31 RX ADMIN — ISOSORBIDE MONONITRATE 60 MG: 60 TABLET, EXTENDED RELEASE ORAL at 09:40

## 2021-07-31 RX ADMIN — SODIUM CHLORIDE 5 MG/HR: 900 INJECTION, SOLUTION INTRAVENOUS at 07:26

## 2021-07-31 RX ADMIN — APIXABAN 5 MG: 5 TABLET, FILM COATED ORAL at 21:24

## 2021-07-31 RX ADMIN — HYDRALAZINE HYDROCHLORIDE 50 MG: 25 TABLET, FILM COATED ORAL at 21:25

## 2021-07-31 RX ADMIN — GABAPENTIN 200 MG: 100 CAPSULE ORAL at 09:40

## 2021-07-31 RX ADMIN — DONEPEZIL HYDROCHLORIDE 10 MG: 5 TABLET, FILM COATED ORAL at 21:25

## 2021-07-31 RX ADMIN — Medication 1000 UNITS: at 09:40

## 2021-07-31 RX ADMIN — ATORVASTATIN CALCIUM 40 MG: 40 TABLET, FILM COATED ORAL at 21:25

## 2021-07-31 RX ADMIN — Medication 10 ML: at 21:26

## 2021-07-31 RX ADMIN — SPIRONOLACTONE 25 MG: 25 TABLET ORAL at 09:41

## 2021-07-31 RX ADMIN — APIXABAN 5 MG: 5 TABLET, FILM COATED ORAL at 09:41

## 2021-08-01 PROBLEM — I48.91 ATRIAL FIBRILLATION WITH RVR (HCC): Status: ACTIVE | Noted: 2021-08-01

## 2021-08-01 LAB
ANION GAP SERPL CALCULATED.3IONS-SCNC: 8 MMOL/L (ref 5–15)
BUN SERPL-MCNC: 55 MG/DL (ref 8–23)
BUN/CREAT SERPL: 27.6 (ref 7–25)
CALCIUM SPEC-SCNC: 9.2 MG/DL (ref 8.6–10.5)
CHLORIDE SERPL-SCNC: 100 MMOL/L (ref 98–107)
CO2 SERPL-SCNC: 33 MMOL/L (ref 22–29)
CREAT SERPL-MCNC: 1.99 MG/DL (ref 0.76–1.27)
GFR SERPL CREATININE-BSD FRML MDRD: 33 ML/MIN/1.73
GLUCOSE BLDC GLUCOMTR-MCNC: 111 MG/DL (ref 70–105)
GLUCOSE BLDC GLUCOMTR-MCNC: 130 MG/DL (ref 70–105)
GLUCOSE BLDC GLUCOMTR-MCNC: 133 MG/DL (ref 70–105)
GLUCOSE BLDC GLUCOMTR-MCNC: 162 MG/DL (ref 70–105)
GLUCOSE BLDC GLUCOMTR-MCNC: 73 MG/DL (ref 70–105)
GLUCOSE SERPL-MCNC: 64 MG/DL (ref 65–99)
POTASSIUM SERPL-SCNC: 4.7 MMOL/L (ref 3.5–5.2)
SODIUM SERPL-SCNC: 141 MMOL/L (ref 136–145)

## 2021-08-01 PROCEDURE — 94799 UNLISTED PULMONARY SVC/PX: CPT

## 2021-08-01 PROCEDURE — 97116 GAIT TRAINING THERAPY: CPT

## 2021-08-01 PROCEDURE — 99232 SBSQ HOSP IP/OBS MODERATE 35: CPT | Performed by: INTERNAL MEDICINE

## 2021-08-01 PROCEDURE — 99233 SBSQ HOSP IP/OBS HIGH 50: CPT | Performed by: INTERNAL MEDICINE

## 2021-08-01 PROCEDURE — 63710000001 INSULIN ISOPHANE & REGULAR PER 5 UNITS: Performed by: INTERNAL MEDICINE

## 2021-08-01 PROCEDURE — 82962 GLUCOSE BLOOD TEST: CPT

## 2021-08-01 PROCEDURE — 80048 BASIC METABOLIC PNL TOTAL CA: CPT | Performed by: INTERNAL MEDICINE

## 2021-08-01 PROCEDURE — 63710000001 INSULIN LISPRO (HUMAN) PER 5 UNITS: Performed by: INTERNAL MEDICINE

## 2021-08-01 RX ADMIN — INSULIN LISPRO 2 UNITS: 100 INJECTION, SOLUTION INTRAVENOUS; SUBCUTANEOUS at 12:12

## 2021-08-01 RX ADMIN — DULOXETINE 60 MG: 30 CAPSULE, DELAYED RELEASE ORAL at 09:03

## 2021-08-01 RX ADMIN — ATORVASTATIN CALCIUM 40 MG: 40 TABLET, FILM COATED ORAL at 22:41

## 2021-08-01 RX ADMIN — APIXABAN 5 MG: 5 TABLET, FILM COATED ORAL at 09:02

## 2021-08-01 RX ADMIN — GABAPENTIN 200 MG: 100 CAPSULE ORAL at 22:37

## 2021-08-01 RX ADMIN — Medication 10 ML: at 22:41

## 2021-08-01 RX ADMIN — ASPIRIN 81 MG CHEWABLE TABLET 81 MG: 81 TABLET CHEWABLE at 09:08

## 2021-08-01 RX ADMIN — APIXABAN 5 MG: 5 TABLET, FILM COATED ORAL at 22:41

## 2021-08-01 RX ADMIN — INSULIN HUMAN 30 UNITS: 100 INJECTION, SUSPENSION SUBCUTANEOUS at 09:04

## 2021-08-01 RX ADMIN — HYDRALAZINE HYDROCHLORIDE 50 MG: 25 TABLET, FILM COATED ORAL at 09:03

## 2021-08-01 RX ADMIN — METOPROLOL SUCCINATE 100 MG: 50 TABLET, EXTENDED RELEASE ORAL at 09:02

## 2021-08-01 RX ADMIN — HYDRALAZINE HYDROCHLORIDE 50 MG: 25 TABLET, FILM COATED ORAL at 22:38

## 2021-08-01 RX ADMIN — SPIRONOLACTONE 25 MG: 25 TABLET ORAL at 09:03

## 2021-08-01 RX ADMIN — GABAPENTIN 200 MG: 100 CAPSULE ORAL at 09:03

## 2021-08-01 RX ADMIN — CLOPIDOGREL BISULFATE 75 MG: 75 TABLET ORAL at 09:03

## 2021-08-01 RX ADMIN — CYANOCOBALAMIN TAB 1000 MCG 1000 MCG: 1000 TAB at 09:02

## 2021-08-01 RX ADMIN — ISOSORBIDE MONONITRATE 60 MG: 60 TABLET, EXTENDED RELEASE ORAL at 17:40

## 2021-08-01 RX ADMIN — ISOSORBIDE MONONITRATE 60 MG: 60 TABLET, EXTENDED RELEASE ORAL at 09:03

## 2021-08-01 RX ADMIN — Medication 10 ML: at 09:04

## 2021-08-01 RX ADMIN — DONEPEZIL HYDROCHLORIDE 10 MG: 5 TABLET, FILM COATED ORAL at 22:38

## 2021-08-01 RX ADMIN — FUROSEMIDE 40 MG: 40 TABLET ORAL at 09:03

## 2021-08-01 RX ADMIN — IPRATROPIUM BROMIDE AND ALBUTEROL SULFATE 3 ML: 2.5; .5 SOLUTION RESPIRATORY (INHALATION) at 08:21

## 2021-08-01 RX ADMIN — IPRATROPIUM BROMIDE AND ALBUTEROL SULFATE 3 ML: 2.5; .5 SOLUTION RESPIRATORY (INHALATION) at 20:48

## 2021-08-01 RX ADMIN — FUROSEMIDE 40 MG: 40 TABLET ORAL at 17:40

## 2021-08-01 RX ADMIN — Medication 1000 UNITS: at 09:03

## 2021-08-01 RX ADMIN — INSULIN HUMAN 30 UNITS: 100 INJECTION, SUSPENSION SUBCUTANEOUS at 17:40

## 2021-08-01 NOTE — PLAN OF CARE
Goal Outcome Evaluation:  Plan of Care Reviewed With: patient           Outcome Summary: pt voiced no concerns this shift; remains on O2 at 2L per n/c; worked with therapy; sat up in chair; continue to monitor

## 2021-08-01 NOTE — PROGRESS NOTES
[facility]     Progress Note    Patient Name: Benson Mclean  : 1950  MRN: [id number]  Primary Care Physician:  Samantha Zabala APRN  Date of admission: 2021    Subjective   Subjective     Chief Complaint: porsha on ckd III    History of Present Illness    Patient with acute on ckd III baseline cr 1.3.  Admitted with shortness of breath and chf    Review of Systems      Objective   Objective     Vitals:   Temp:  [97.9 °F (36.6 °C)-98.2 °F (36.8 °C)] 98.2 °F (36.8 °C)  Heart Rate:  [65-67] 67  Resp:  [16-18] 18  BP: (119-145)/(48-58) 130/50  Flow (L/min):  [2] 2    Physical Exam     General Appearance:  In no acute distress.    HEENT: Normal HEENT exam.     Extremities: .  There is no deformity, effusion or dependent edema.    Neurological: Patient is alert        Result Review    Result Review:  I have personally reviewed the results from the time of this admission to 2021 08:13 EDT and agree with these findings:  []  Laboratory  []  Microbiology  []  Radiology  []  EKG/Telemetry   []  Cardiology/Vascular   []  Pathology  []  Old records  []  Other:      Assessment/Plan   Assessment / Plan     Brief Patient Summary:  Benson Mclean is a 70 y.o. male who has history of ckd III baseline cr 1.3 admitted with chf and porsha    Active Hospital Problems:  Active Hospital Problems    Diagnosis    • **Acute on chronic systolic heart failure (CMS/HCC)    • Nonsustained ventricular tachycardia (CMS/HCC)    • Tobacco use disorder    • CKD (chronic kidney disease) stage 3, GFR 30-59 ml/min (CMS/HCC)    • Type 2 diabetes mellitus with hyperglycemia (CMS/HCC)    • Hyperlipidemia    • History of cerebrovascular accident    • Hemiplegia, unspecified affecting right dominant side (CMS/HCC)    • Essential hypertension    • Coronary artery disease    • Chronic left-sided low back pain without sciatica      Last Assessment & Plan:   Formatting of this note might be different from the original.  Stable on prn  hydrocodone       Plan:   1. porsha  2. ckd III  3. chf  4. T2DM  5. hypertension    Patient seen and examined. Without complaints. Labs reviewed. Cr stable at likely baseline. Will continue current and follow    Electronically signed by Mary Harden MD, 07/30/21, 3:25 PM EDT.

## 2021-08-01 NOTE — PLAN OF CARE
Assessment: Benson Mclean presents with functional mobility impairments which indicate the need for skilled intervention. Tolerating session today without incident.Patient had 1 sl LOB when he was walking, talking and trying to look out window. Presents with WBOS, lat sway, LLE ER and easily challenged.Supposed to return to LTC at [facility] at [place] Will continue to follow and progress as tolerated.

## 2021-08-01 NOTE — PROGRESS NOTES
Cardiology Progress Note      Admiting Physician:  Jeff Black MD   LOS: 9 days       Reason For Followup:  Atrial fibrillation with rapid ventricular response  Cardiomyopathy  Congestive heart failure      Subjective:    Interval History:  Seen and examined.  Chart labs reviewed.  Patient tolerating metoprolol succinate well.  Heart rate is well controlled today.    Review of Systems:  A complete review of systems was undertaken with the pertinent cardiovascular findings listed in history of present illness and all other systems being negative.     Assessment & Plan    Impressions:  Atrial fibrillation with rapid ventricular response  Cardiomyopathy with most recent ejection fraction 30% this admission  Congestive heart failure acute on chronic systolic  Hyperlipidemia  Coronary artery disease  Chronic kidney disease  Cardiogenic shock with utilization of dobutamine this admission-discontinued    Recommendations:  Rate control with negative chronotropic agents     continue with Toprol-XL  Continue with Eliquis  Guideline directed medical therapy for heart failure     Institution of ACE/ARB/Entresto difficult given renal insufficiency  Monitor rate and rhythm  Monitor renal function and electrolytes  Further recommendations as patient's course progresses.    Objective:    Medication Review:   Scheduled Meds:apixaban, 5 mg, Oral, Q12H  aspirin, 81 mg, Oral, Daily  atorvastatin, 40 mg, Oral, Nightly  cholecalciferol, 1,000 Units, Oral, Daily  clopidogrel, 75 mg, Oral, Daily  donepezil, 10 mg, Oral, Nightly  DULoxetine, 60 mg, Oral, Daily  furosemide, 40 mg, Oral, BID  gabapentin, 200 mg, Oral, Q12H  hydrALAZINE, 50 mg, Oral, Q12H  insulin lispro, 0-7 Units, Subcutaneous, TID AC  insulin NPH-insulin regular, 30 Units, Subcutaneous, BID With Meals  isosorbide mononitrate, 60 mg, Oral, BID - Nitrates  metoprolol succinate XL, 100 mg, Oral, Q24H  sodium chloride, 10 mL, Intravenous, Q12H  spironolactone, 25 mg,  Oral, Daily  vitamin B-12, 1,000 mcg, Oral, Daily      Continuous Infusions:dilTIAZem, 5-15 mg/hr, Last Rate: Stopped (08/01/21 0130)  DOBUTamine, 5 mcg/kg/min, Last Rate: Stopped (07/30/21 0949)      PRN Meds:.•  albuterol  •  dextrose  •  dextrose  •  glucagon (human recombinant)  •  HYDROcodone-acetaminophen  •  insulin lispro **AND** insulin lispro  •  ipratropium-albuterol  •  nitroglycerin  •  ondansetron  •  sodium chloride  •  sodium chloride    Patient Active Problem List   Diagnosis   • Diabetes mellitus due to underlying condition without complication, without long-term current use of insulin (CMS/[facility])   • S/P CABG (coronary artery bypass graft)   • Essential hypertension   • Dyslipidemia   • Fall   • Elevated troponin   • Closed fracture of seventh thoracic vertebra (CMS/[facility])   • Cerebrovascular accident (CVA) (CMS/[facility])   • Other headache syndrome   • Right hand weakness   • Neurologic gait dysfunction   • Tobacco user   • Status post coronary artery stent placement   • ANNETTE treated with BiPAP   • Right hemiplegia (CMS/[facility])   • Major depressive disorder, recurrent, mild (CMS/[facility])   • Immobility syndrome   • Lymphadenopathy, mediastinal   • Hyperlipidemia   • History of cerebrovascular accident   • History of amputation of lesser toe (CMS/[facility])   • Hemiplegia, unspecified affecting right dominant side (CMS/[facility])   • Diabetic neuropathy (CMS/[facility])   • Depressive disorder   • Unspecified dementia with behavioral disturbance (CMS/[facility])   • COVID-19   • Coronary artery disease   • Constipation   • Obesity   • Cognitive communication deficit   • Vitamin D deficiency   • Chronic venous hypertension (idiopathic) with ulcer and inflammation of bilateral lower extremity (CMS/[facility])   • Chronic obstructive pulmonary disease, unspecified (CMS/[facility])   • Chronic foot ulcer (CMS/[facility])   • Chronic left-sided low back pain without sciatica   • Acute congestive heart failure (CMS/[facility])   • Paroxysmal atrial fibrillation (CMS/[facility])    • Arthritis   • Acute renal insufficiency   • Atrial fibrillation with rapid ventricular response (CMS/HCC)   • Type 2 diabetes mellitus with hyperglycemia (CMS/HCC)   • Abnormal nuclear stress test   • Cytokine release syndrome, grade 1   • Unstable angina (CMS/HCC)   • Acute respiratory distress   • CKD (chronic kidney disease) stage 3, GFR 30-59 ml/min (CMS/HCC)   • Tobacco use disorder   • Acute on chronic systolic heart failure (CMS/HCC)   • Nonsustained ventricular tachycardia (CMS/HCC)         Physical Exam:    General: Alert, cooperative, no distress, appears stated age.  Chronically ill-appearing  Head:  Normocephalic, atraumatic, mucous membranes moist  Eyes:  Conjunctiva/corneas clear, EOM's intact     Neck:  Supple,  no bruit  Lungs: Coarse and diminished bilaterally  Chest wall: No tenderness  Heart::  Regular rate and rhythm, S1 and S2 normal, 1/6 holosystolic murmur.  No rub or gallop  Abdomen: Soft, non-tender, nondistended bowel sounds active  Extremities: No cyanosis, clubbing, or edema  Pulses: 2+ and symmetric all extremities  Skin:  No rashes or lesions  Neuro/psych: A&O x3. CN II through XII are grossly intact with appropriate affect    Vital Signs:  Vitals:    08/01/21 0146 08/01/21 0546 08/01/21 0821 08/01/21 0825   BP: 145/51 130/50     BP Location: Left arm Left arm     Patient Position: Lying Lying     Pulse:  67 69 64   Resp: 18 18 17 17   Temp: 98 °F (36.7 °C) 98.2 °F (36.8 °C)     TempSrc: Oral Oral     SpO2:  96% 95%    Weight:  125 kg (276 lb 0.3 oz)     Height:         Wt Readings from Last 1 Encounters:   08/01/21 125 kg (276 lb 0.3 oz)       Intake/Output Summary (Last 24 hours) at 8/1/2021 0937  Last data filed at 8/1/2021 0300  Gross per 24 hour   Intake 887.83 ml   Output 1550 ml   Net -662.17 ml         Results Review:     CBC    Results from last 7 days   Lab Units 07/31/21  0238 07/27/21  0252   WBC 10*3/mm3 6.00 6.90   HEMOGLOBIN g/dL 7.7* 7.7*   PLATELETS 10*3/mm3 247 269      Cr Clearance Estimated Creatinine Clearance: 45.8 mL/min (A) (by C-G formula based on SCr of 1.99 mg/dL (H)).  Coag     HbA1C   Lab Results   Component Value Date    HGBA1C 7.4 (H) 07/21/2021    HGBA1C 7.2 (H) 05/19/2021    HGBA1C 7.5 (H) 05/06/2021     Blood Glucose   Glucose   Date/Time Value Ref Range Status   08/01/2021 0721 111 (H) 70 - 105 mg/dL Final     Comment:     Serial Number: [id number]:  [id number]   08/01/2021 0411 73 70 - 105 mg/dL Final     Comment:     Serial Number: [id number]:  [id number]   07/31/2021 2040 114 (H) 70 - 105 mg/dL Final     Comment:     Serial Number: [id number]:  [id number]   07/31/2021 1623 168 (H) 70 - 105 mg/dL Final     Comment:     Serial Number: [id number]:  [id number]   07/31/2021 1202 171 (H) 70 - 105 mg/dL Final     Comment:     Serial Number: [id number]:  [id number]   07/31/2021 0730 93 70 - 105 mg/dL Final     Comment:     Serial Number: [id number]:  [id number]   07/30/2021 2042 145 (H) 70 - 105 mg/dL Final     Comment:     Serial Number: [id number]:  [id number]   07/30/2021 1518 148 (H) 70 - 105 mg/dL Final     Comment:     Serial Number: [id number]:  [id number]     Infection     CMP   Results from last 7 days   Lab Units 08/01/21  0305 07/31/21  0238 07/30/21  0225 07/29/21  0322 07/28/21  0232 07/27/21  0252 07/26/21  0342   SODIUM mmol/L 141 139 138 138 137 137 141   POTASSIUM mmol/L 4.7 4.6 4.4 4.1 3.9 4.0 4.1   CHLORIDE mmol/L 100 100 97* 98 96* 96* 98   CO2 mmol/L 33.0* 31.0* 31.0* 32.0* 32.0* 31.0* 34.0*   BUN mg/dL 55* 51* 52* 50* 49* 49* 47*   CREATININE mg/dL 1.99* 1.86* 1.98* 2.07* 2.34* 2.27* 2.23*   GLUCOSE mg/dL 64* 99 93 89 76 126* 66     ABG      UA    Results from last 7 days   Lab Units 07/28/21  1703   NITRITE UA  Negative   WBC UA /HPF Too Numerous to Count*   BACTERIA UA /HPF Trace*   SQUAM EPITHEL UA /HPF 0-2     SAVANA  No results found for: POCMETH, POCAMPHET, POCBARBITUR, POCBENZO, POCCOCAINE,  POCOPIATES, POCOXYCODO, POCPHENCYC, POCPROPOXY, POCTHC, POCTRICYC  Lysis Labs   Results from last 7 days   Lab Units 08/01/21  0305 07/31/21  0238 07/30/21  0225 07/29/21  0322 07/28/21  0232 07/27/21  0252 07/26/21  0342   HEMOGLOBIN g/dL  --  7.7*  --   --   --  7.7*  --    PLATELETS 10*3/mm3  --  247  --   --   --  269  --    CREATININE mg/dL 1.99* 1.86* 1.98* 2.07* 2.34* 2.27* 2.23*     Radiology(recent) No radiology results for the last day            Imaging Results (Last 24 Hours)     ** No results found for the last 24 hours. **          Cardiac Studies:  Echo- Results for orders placed during the hospital encounter of 07/20/21    Adult Transthoracic Echo Complete With Contrast if Necessary Per Protocol    Interpretation Summary  · Estimated left ventricular EF = 30% Left ventricular systolic function is moderately decreased.    Occasions  Shortness of breath.    Technically satisfactory study.  Mitral valve is structurally normal.  Mild mitral regurgitation.  Tricuspid valve is structurally normal.  Aortic valve is structurally normal.  Pulmonic valve could not be well visualized.  No evidence for tricuspid or aortic regurgitation is seen by Doppler study.  Biatrial and biventricular enlargement is present.  Diffuse left ventricular hypocontractility with ejection fraction of 30%.  Atrial septum is intact.  Aorta is normal.  No pericardial effusion or intracardiac thrombus is seen.    Impression  Mild mitral regurgitation.  Significant biatrial and biventricular enlargement.  Diffuse left ventricular hypocontractility with ejection fraction of 30%.  No pericardial effusion or intracardiac thrombus is seen.    Stress Myoview-  Cath-        Zaheer Mccauley DO  08/01/21  09:37 EDT

## 2021-08-01 NOTE — THERAPY TREATMENT NOTE
Subjective: Pt agreeable to therapeutic plan of care.    Objective:     Bed mobility - Min-A  Transfers - Min-A and with rolling walker  Ambulation - 40 feet Min-A and with rolling walker    Pain: c/o L knee hurting like a boil every since stroke  Education: Provided education on importance of mobility and skilled verbal / tactile cueing throughout intervention.     Assessment: Benson Mclean presents with functional mobility impairments which indicate the need for skilled intervention. Tolerating session today without incident.Patient had 1 sl LOB when he was walking, talking and trying to look out window. Presents with WBOS, lat sway, LLE ER and easily challenged.Supposed to return to LTC at [facility] at [place] Will continue to follow and progress as tolerated.     Plan/Recommendations:   Pt would benefit from Skilled Rehab placement at discharge from facility and requires no DME at discharge.   Pt desires Skilled Rehab placement at discharge. Pt cooperative; agreeable to therapeutic recommendations and plan of care.     Basic Mobility 6-click:  Rollin = Total, A lot = 2, A little = 3; 4 = None  Supine>Sit:   1 = Total, A lot = 2, A little = 3; 4 = None   Sit>Stand with arms:  1 = Total, A lot = 2, A little = 3; 4 = None  Bed>Chair:   1 = Total, A lot = 2, A little = 3; 4 = None  Ambulate in room:  1 = Total, A lot = 2, A little = 3; 4 = None  3-5 Steps with railin = Total, A lot = 2, A little = 3; 4 = None  Score: 17    Modified [place]: 4 = Moderately severe disability (Unable to attend to own bodily needs without assistance, and unable to walk unassisted)     Post-Tx Position: Up in Chair, Alarms activated and Call light and personal items within reach  PPE: gloves, surgical mask, eyewear protection

## 2021-08-01 NOTE — PROGRESS NOTES
[facility] Medicine Services Daily Progress Note    Patient Name: Benson Mclean  : 1950  MRN: [id number]  Primary Care Physician:  Samantha Zabala APRN  Date of admission: 2021      Subjective      Chief Complaint: Shortness of breath.      Patient Reports no overnight events.      Review of Systems   Constitutional: Negative.   HENT: Negative.    Eyes: Negative.    Cardiovascular: Negative.    Respiratory: Negative.    Endocrine: Negative.    Hematologic/Lymphatic: Negative.    Skin: Negative.    Musculoskeletal: Negative.    Gastrointestinal: Negative.    Genitourinary: Negative.    Neurological: Negative.    Psychiatric/Behavioral: Negative.    Allergic/Immunologic: Negative.             Objective      Vitals:   Temp:  [97.7 °F (36.5 °C)-98.2 °F (36.8 °C)] 97.7 °F (36.5 °C)  Heart Rate:  [62-69] 62  Resp:  [17-18] 18  BP: (102-145)/(50-63) 121/52  Flow (L/min):  [2] 2    Physical Exam  Vitals and nursing note reviewed.   Constitutional:       General: He is not in acute distress.     Appearance: Normal appearance. He is obese.      Comments: Patient appears chronically ill.   HENT:      Head: Normocephalic.      Nose: Nose normal.      Mouth/Throat:      Mouth: Mucous membranes are dry.      Pharynx: Oropharynx is clear.   Eyes:      Extraocular Movements: Extraocular movements intact.      Conjunctiva/sclera: Conjunctivae normal.      Pupils: Pupils are equal, round, and reactive to light.   Cardiovascular:      Rate and Rhythm: Normal rate and regular rhythm.      Pulses: Normal pulses.      Heart sounds: No murmur heard.   No friction rub. No gallop.       Comments: S1 and S2 present.  No tachycardia.  Pulmonary:      Effort: Pulmonary effort is normal.      Breath sounds: No stridor. No wheezing or rales.      Comments: Good air entry bilaterally.  Chest:      Chest wall: No tenderness.   Abdominal:      General: Bowel sounds are normal. There is no distension.       Palpations: Abdomen is soft.      Tenderness: There is no abdominal tenderness. There is no right CVA tenderness or guarding.   Musculoskeletal:         General: No swelling, tenderness, deformity or signs of injury.      Cervical back: Normal range of motion. No rigidity.      Right lower leg: No edema.      Left lower leg: No edema.   Skin:     General: Skin is warm and dry.      Capillary Refill: Capillary refill takes less than 2 seconds.      Coloration: Skin is not jaundiced.      Findings: No bruising, erythema, lesion or rash.   Neurological:      General: No focal deficit present.      Comments: No facial asymmetry noted.  Gait and station not tested.               Result Review    Result Review:  I have personally reviewed the results from the time of this admission to 8/1/2021 18:17 EDT and agree with these findings:  [x]  Laboratory  [x]  Microbiology  [x]  Radiology  []  EKG/Telemetry   []  Cardiology/Vascular   []  Pathology  []  Old records  []  Other:  Most notable findings include: Hemoglobin 7.7.  Hematocrit 23.8.  Platelets 247K.       Wounds (last 24 hours)      LDA Wound     Row Name 08/01/21 0855 08/01/21 0400 08/01/21 0000       Wound 07/22/21 1900 Right posterior hand    Wound - Properties Group Placement Date: 07/22/21  -BD Placement Time: 1900  -BD Side: Right  -BD Orientation: posterior  -BD Location: hand  -BD Stage, Pressure Injury : other (see comments)  -BD, skin tear     Dressing Appearance  --  dry;intact  -MS  dry;intact  -MS    Closure  Open to air  -ER  OZIEL  -MS  OZIEL  -MS    Base  --  -ER  dressing in place, unable to visualize  -MS  dressing in place, unable to visualize  -MS    Drainage Amount  none  -ER  none  -MS  none  -MS    Retired Wound - Properties Group Date first assessed: 07/22/21  -BD Time first assessed: 1900  -BD Side: Right  -BD Location: hand  -BD    Row Name 07/31/21 2000             Wound 07/22/21 1900 Right posterior hand    Wound - Properties Group Placement  Date: 07/22/21  -BD Placement Time: 1900  -BD Side: Right  -BD Orientation: posterior  -BD Location: hand  -BD Stage, Pressure Injury : other (see comments)  -BD, skin tear     Dressing Appearance  dry;intact  -MS      Closure  OZIEL  -MS      Base  dressing in place, unable to visualize  -MS      Drainage Amount  none  -MS      Retired Wound - Properties Group Date first assessed: 07/22/21  -BD Time first assessed: 1900  -BD Side: Right  -BD Location: hand  -BD      User Key  (r) = Recorded By, (t) = Taken By, (c) = Cosigned By    Initials Name Provider Type    MS AcostaRadha, RN Registered Nurse    Liss Christianson RN Registered Nurse    Ebony Trammell RN Registered Nurse            Assessment/Plan      Brief Patient Summary:  Patient is a 70-year-old male with past medical history of tobacco use disorder, 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  Patient was seen in the emergency room and  diagnosed with acute congestive heart failure.  Cardiology consult was completed.  Patient was a transferred from [facility] to PCU for dobutamine drip.  Patient went into A. fib with RVR last night and was started on diltiazem gtt.        apixaban, 5 mg, Oral, Q12H  aspirin, 81 mg, Oral, Daily  atorvastatin, 40 mg, Oral, Nightly  cholecalciferol, 1,000 Units, Oral, Daily  clopidogrel, 75 mg, Oral, Daily  donepezil, 10 mg, Oral, Nightly  DULoxetine, 60 mg, Oral, Daily  furosemide, 40 mg, Oral, BID  gabapentin, 200 mg, Oral, Q12H  hydrALAZINE, 50 mg, Oral, Q12H  insulin lispro, 0-7 Units, Subcutaneous, TID AC  insulin NPH-insulin regular, 30 Units, Subcutaneous, BID With Meals  isosorbide mononitrate, 60 mg, Oral, BID -  Nitrates  metoprolol succinate XL, 100 mg, Oral, Q24H  sodium chloride, 10 mL, Intravenous, Q12H  spironolactone, 25 mg, Oral, Daily  vitamin B-12, 1,000 mcg, Oral, Daily       dilTIAZem, 5-15 mg/hr, Last Rate: Stopped (08/01/21 0130)  DOBUTamine, 5 mcg/kg/min, Last Rate: Stopped (07/30/21 0949)         Active Hospital Problems:  Active Hospital Problems    Diagnosis    • Acute on chronic congestive heart failure (CMS/[facility])   Ejection fraction is 35%.  This is acute on chronic systolic heart failure.  Follow cardiology recommendations.    Nonsustained ventricular tachycardia.  Follow cardiology recommendations.    Atrial fibrillation with rapid ventricular response  Follow cardiology recommendations.  Treat with diltiazem gtt.      Hypertension  .  Treat with hydralazine.    Dementia.  Treat with Aricept.    Hyperlipidemia  Treat with Lipitor.    Atrial fibrillation.  Treat with Toprol XL.    Coronary artery disease.  Treat with aspirin and Plavix.    Diabetes mellitus type 2  Treat with insulin therapy.    Diabetic neuropathy.  Treat with gabapentin.            Continue appropriate patient's home medications for other chronic medical conditions.  Continue the present level of care.  Patient and family agreed with the plan of care.      DVT prophylaxis:  Medical DVT prophylaxis orders are present.    CODE STATUS:    Level Of Support Discussed With: Patient  Code Status: CPR  Medical Interventions (Level of Support Prior to Arrest): Full      Disposition: Pending patient's clinical improvement.    This patient has been examined wearing appropriate Personal Protective Equipment and discussed with hospital infection control department, [facility], infectious disease specialist and pulmonologist. 08/01/21      Electronically signed by Jeff Black MD, 08/01/21, 18:17 EDT.      [facility] Hospitalist Team

## 2021-08-02 VITALS
HEIGHT: 70 IN | SYSTOLIC BLOOD PRESSURE: 136 MMHG | WEIGHT: 276.02 LBS | TEMPERATURE: 97.6 F | OXYGEN SATURATION: 95 % | BODY MASS INDEX: 39.52 KG/M2 | HEART RATE: 63 BPM | RESPIRATION RATE: 18 BRPM | DIASTOLIC BLOOD PRESSURE: 56 MMHG

## 2021-08-02 PROBLEM — I69.351 FLACCID HEMIPLEGIA OF RIGHT DOMINANT SIDE AS LATE EFFECT OF CEREBRAL INFARCTION (HCC): Chronic | Status: ACTIVE | Noted: 2020-12-01

## 2021-08-02 PROBLEM — E78.2 MIXED HYPERLIPIDEMIA: Chronic | Status: ACTIVE | Noted: 2021-05-06

## 2021-08-02 LAB
ANION GAP SERPL CALCULATED.3IONS-SCNC: 6 MMOL/L (ref 5–15)
BUN SERPL-MCNC: 64 MG/DL (ref 8–23)
BUN/CREAT SERPL: 32.8 (ref 7–25)
CALCIUM SPEC-SCNC: 9.1 MG/DL (ref 8.6–10.5)
CHLORIDE SERPL-SCNC: 97 MMOL/L (ref 98–107)
CO2 SERPL-SCNC: 34 MMOL/L (ref 22–29)
CREAT SERPL-MCNC: 1.95 MG/DL (ref 0.76–1.27)
GFR SERPL CREATININE-BSD FRML MDRD: 34 ML/MIN/1.73
GLUCOSE BLDC GLUCOMTR-MCNC: 177 MG/DL (ref 70–105)
GLUCOSE BLDC GLUCOMTR-MCNC: 183 MG/DL (ref 70–105)
GLUCOSE BLDC GLUCOMTR-MCNC: 198 MG/DL (ref 70–105)
GLUCOSE BLDC GLUCOMTR-MCNC: 77 MG/DL (ref 70–105)
GLUCOSE SERPL-MCNC: 92 MG/DL (ref 65–99)
POTASSIUM SERPL-SCNC: 4.6 MMOL/L (ref 3.5–5.2)
SODIUM SERPL-SCNC: 137 MMOL/L (ref 136–145)

## 2021-08-02 PROCEDURE — 99232 SBSQ HOSP IP/OBS MODERATE 35: CPT | Performed by: INTERNAL MEDICINE

## 2021-08-02 PROCEDURE — 80048 BASIC METABOLIC PNL TOTAL CA: CPT | Performed by: INTERNAL MEDICINE

## 2021-08-02 PROCEDURE — 63710000001 INSULIN ISOPHANE & REGULAR PER 5 UNITS: Performed by: INTERNAL MEDICINE

## 2021-08-02 PROCEDURE — 97535 SELF CARE MNGMENT TRAINING: CPT

## 2021-08-02 PROCEDURE — 94799 UNLISTED PULMONARY SVC/PX: CPT

## 2021-08-02 PROCEDURE — 99239 HOSP IP/OBS DSCHRG MGMT >30: CPT | Performed by: INTERNAL MEDICINE

## 2021-08-02 PROCEDURE — 63710000001 INSULIN LISPRO (HUMAN) PER 5 UNITS: Performed by: INTERNAL MEDICINE

## 2021-08-02 PROCEDURE — 82962 GLUCOSE BLOOD TEST: CPT

## 2021-08-02 RX ORDER — METOPROLOL SUCCINATE 100 MG/1
100 TABLET, EXTENDED RELEASE ORAL
Qty: 30 TABLET | Refills: 0 | Status: SHIPPED | OUTPATIENT
Start: 2021-08-03 | End: 2021-08-25 | Stop reason: HOSPADM

## 2021-08-02 RX ORDER — SPIRONOLACTONE 25 MG/1
25 TABLET ORAL DAILY
Qty: 30 TABLET | Refills: 0 | Status: SHIPPED | OUTPATIENT
Start: 2021-08-03 | End: 2021-08-25 | Stop reason: HOSPADM

## 2021-08-02 RX ADMIN — APIXABAN 5 MG: 5 TABLET, FILM COATED ORAL at 21:12

## 2021-08-02 RX ADMIN — ISOSORBIDE MONONITRATE 60 MG: 60 TABLET, EXTENDED RELEASE ORAL at 18:05

## 2021-08-02 RX ADMIN — FUROSEMIDE 40 MG: 40 TABLET ORAL at 09:56

## 2021-08-02 RX ADMIN — Medication 1000 UNITS: at 09:56

## 2021-08-02 RX ADMIN — GABAPENTIN 200 MG: 100 CAPSULE ORAL at 09:56

## 2021-08-02 RX ADMIN — ISOSORBIDE MONONITRATE 60 MG: 60 TABLET, EXTENDED RELEASE ORAL at 09:56

## 2021-08-02 RX ADMIN — ATORVASTATIN CALCIUM 40 MG: 40 TABLET, FILM COATED ORAL at 21:12

## 2021-08-02 RX ADMIN — FUROSEMIDE 40 MG: 40 TABLET ORAL at 18:05

## 2021-08-02 RX ADMIN — GABAPENTIN 200 MG: 100 CAPSULE ORAL at 21:12

## 2021-08-02 RX ADMIN — CYANOCOBALAMIN TAB 1000 MCG 1000 MCG: 1000 TAB at 09:56

## 2021-08-02 RX ADMIN — IPRATROPIUM BROMIDE AND ALBUTEROL SULFATE 3 ML: 2.5; .5 SOLUTION RESPIRATORY (INHALATION) at 20:35

## 2021-08-02 RX ADMIN — SPIRONOLACTONE 25 MG: 25 TABLET ORAL at 09:56

## 2021-08-02 RX ADMIN — INSULIN HUMAN 30 UNITS: 100 INJECTION, SUSPENSION SUBCUTANEOUS at 18:05

## 2021-08-02 RX ADMIN — METOPROLOL SUCCINATE 100 MG: 50 TABLET, EXTENDED RELEASE ORAL at 09:56

## 2021-08-02 RX ADMIN — INSULIN LISPRO 2 UNITS: 100 INJECTION, SOLUTION INTRAVENOUS; SUBCUTANEOUS at 18:05

## 2021-08-02 RX ADMIN — INSULIN LISPRO 2 UNITS: 100 INJECTION, SOLUTION INTRAVENOUS; SUBCUTANEOUS at 12:07

## 2021-08-02 RX ADMIN — DULOXETINE 60 MG: 30 CAPSULE, DELAYED RELEASE ORAL at 09:56

## 2021-08-02 RX ADMIN — CLOPIDOGREL BISULFATE 75 MG: 75 TABLET ORAL at 09:56

## 2021-08-02 RX ADMIN — HYDRALAZINE HYDROCHLORIDE 50 MG: 25 TABLET, FILM COATED ORAL at 09:56

## 2021-08-02 RX ADMIN — ASPIRIN 81 MG CHEWABLE TABLET 81 MG: 81 TABLET CHEWABLE at 09:56

## 2021-08-02 RX ADMIN — HYDRALAZINE HYDROCHLORIDE 50 MG: 25 TABLET, FILM COATED ORAL at 21:12

## 2021-08-02 RX ADMIN — DONEPEZIL HYDROCHLORIDE 10 MG: 5 TABLET, FILM COATED ORAL at 21:11

## 2021-08-02 RX ADMIN — APIXABAN 5 MG: 5 TABLET, FILM COATED ORAL at 09:56

## 2021-08-02 RX ADMIN — Medication 10 ML: at 21:12

## 2021-08-02 RX ADMIN — Medication 10 ML: at 11:01

## 2021-08-02 NOTE — DISCHARGE SUMMARY
[facility] Medicine Services  DISCHARGE SUMMARY    Patient Name: Benson Mclean  : 1950  MRN: [id number]    Date of Admission: 2021  Date of Discharge:   21, 6:40 PM EDT    Primary Care Physician: Samantha Zabala APRN      Presenting Problem:   Elevated troponin [R77.8]  Chest pain, unspecified type [R07.9]  Acute on chronic congestive heart failure, unspecified heart failure type (CMS/HCC) [I50.9]  Acute on chronic systolic heart failure (CMS/HCC) [I50.23]    Active and Resolved Hospital Problems:  Active Hospital Problems    Diagnosis POA   • **Acute on chronic systolic heart failure (CMS/HCC) [I50.23] Yes     Priority: High   • Atrial fibrillation with RVR (CMS/[facility]) [I48.91] Yes   • Nonsustained ventricular tachycardia (CMS/[facility]) [I47.2] Yes   • Tobacco use disorder [F17.200] Yes   • CKD (chronic kidney disease) stage 3, GFR 30-59 ml/min (CMS/[facility]) [N18.30] Yes   • Type 2 diabetes mellitus with hyperglycemia (CMS/[facility]) [E11.65] Yes   • Mixed hyperlipidemia [E78.2] Yes   • Flaccid hemiplegia of right dominant side as late effect of cerebral infarction (CMS/[facility]) [I69.351] Not Applicable   • Essential hypertension [I10] Yes   • Coronary artery disease [I25.10] Yes      Resolved Hospital Problems   No resolved problems to display.         Hospital Course     Hospital Course:  Benson Mclean is a 70 y.o. male with past medical history of AClaire gudino on Eliquis DM hypertension dyslipidemia who presented to [facility] on 2021 complaining of of shortness of breath for the last 2 days, at rest, worse with minimal activity, no associated cough fever or chills no associated chest pain or tightness, associated with weight gain, patient think he might gain 12 pounds over the last couple of weeks.  ER course: Patient was tachycardic 120s on arrival, iregular heart rate, given Cardizem bolus with heart rate now in 80s 90s, labs show troponin of 0.139, BNP 11.7k,  chest x-ray show bilateral basilar opacities significant for pulmonary edema, also improved from the previous x-ray from a month ago.  Patient started on IV Lasix, will be admitted for further diuresis and cardiology evaluation.    During the hospital stay the patient was seen by nephrology and cardiology.  He was on IV dobutamine and IV Cardizem to control heart rate.  He is in sinus rhythm now.  Symptoms are much better.  He is not on home oxygen.  He is saturating close to 100% now on 2 to 3 L documented.  He quit smoking in fall 2020.  His renal function has seen a steady decline.  His anemia appears to be that of chronic renal disease.    Review of Systems   Constitutional: Negative for decreased appetite, diaphoresis and fever.   HENT: Negative for congestion.    Eyes: Negative for blurred vision.   Cardiovascular: Positive for dyspnea on exertion. Negative for chest pain and irregular heartbeat.   Respiratory: Positive for shortness of breath. Negative for cough.    Skin: Negative for color change and itching.   Gastrointestinal: Negative for abdominal pain, constipation and diarrhea.   Genitourinary: Negative for dysuria.   Neurological: Negative for dizziness, focal weakness and numbness.   Psychiatric/Behavioral: Negative for depression.   Noted      DISCHARGE Follow Up Recommendations for labs and diagnostics: Nephrology and cardiology.      Reasons For Change In Medications and Indications for New Medications:      Day of Discharge     Vital Signs:  Temp:  [96.3 °F (35.7 °C)-97.8 °F (36.6 °C)] 97.5 °F (36.4 °C)  Heart Rate:  [] 62  Resp:  [17-20] 20  BP: (117-134)/(50-58) 126/50  Flow (L/min):  [3] 3    Physical Exam:  Physical Exam  Vitals and nursing note reviewed.   Constitutional:       General: He is not in acute distress.     Appearance: He is well-developed. He is obese. He is not ill-appearing, toxic-appearing or diaphoretic.   HENT:      Head: Normocephalic and atraumatic.      Right Ear:  Ear canal and external ear normal.      Left Ear: Ear canal and external ear normal.      Nose: Nose normal. No congestion or rhinorrhea.      Mouth/Throat:      Mouth: Mucous membranes are moist.      Pharynx: No oropharyngeal exudate.   Eyes:      General: No scleral icterus.        Right eye: No discharge.         Left eye: No discharge.      Extraocular Movements: Extraocular movements intact.      Conjunctiva/sclera: Conjunctivae normal.      Pupils: Pupils are equal, round, and reactive to light.   Neck:      Thyroid: No thyromegaly.      Vascular: No carotid bruit or JVD.      Trachea: No tracheal deviation.   Cardiovascular:      Rate and Rhythm: Normal rate and regular rhythm.      Heart sounds: Normal heart sounds. No murmur heard.   No friction rub. No gallop.       Comments: Pedal pulses absent  Pulmonary:      Effort: Pulmonary effort is normal. No respiratory distress.      Breath sounds: Normal breath sounds. No stridor. No wheezing, rhonchi or rales.   Chest:      Chest wall: No tenderness.   Abdominal:      General: Bowel sounds are normal. There is no distension.      Palpations: Abdomen is soft. There is no mass.      Tenderness: There is no abdominal tenderness. There is no guarding or rebound.      Hernia: No hernia is present.   Musculoskeletal:         General: No swelling, tenderness, deformity or signs of injury. Normal range of motion.      Cervical back: Normal range of motion and neck supple. No rigidity. No muscular tenderness.      Right lower leg: Edema present.      Left lower leg: Edema present.   Lymphadenopathy:      Cervical: No cervical adenopathy.   Skin:     General: Skin is warm and dry.      Coloration: Skin is not jaundiced or pale.      Findings: No bruising, erythema or rash.   Neurological:      General: No focal deficit present.      Mental Status: He is alert and oriented to person, place, and time. Mental status is at baseline.      Cranial Nerves: No cranial nerve  deficit.      Sensory: No sensory deficit.      Motor: No weakness or abnormal muscle tone.      Coordination: Coordination normal.   Psychiatric:         Mood and Affect: Mood normal.         Behavior: Behavior normal.         Thought Content: Thought content normal.         Judgment: Judgment normal.            Pertinent  and/or Most Recent Results     LAB RESULTS:      Lab 07/31/21  0238 07/27/21 0252   WBC 6.00 6.90   HEMOGLOBIN 7.7* 7.7*   HEMATOCRIT 23.8* 23.9*   PLATELETS 247 269   NEUTROS ABS 3.30  --    LYMPHS ABS 1.40  --    MONOS ABS 0.70  --    EOS ABS 0.60*  --    MCV 86.3 87.3         Lab 08/02/21  0244 08/01/21  0305 07/31/21  0238 07/30/21  0225 07/29/21  0322 07/28/21 0232 07/27/21 0252   SODIUM 137 141 139 138 138   < > 137   POTASSIUM 4.6 4.7 4.6 4.4 4.1   < > 4.0   CHLORIDE 97* 100 100 97* 98   < > 96*   CO2 34.0* 33.0* 31.0* 31.0* 32.0*   < > 31.0*   ANION GAP 6.0 8.0 8.0 10.0 8.0   < > 10.0   BUN 64* 55* 51* 52* 50*   < > 49*   CREATININE 1.95* 1.99* 1.86* 1.98* 2.07*   < > 2.27*   GLUCOSE 92 64* 99 93 89   < > 126*   CALCIUM 9.1 9.2 9.0 8.5* 8.5*   < > 8.2*   MAGNESIUM  --   --   --  2.2  --   --  2.4    < > = values in this interval not displayed.             Lab 07/27/21 0252   PROBNP 7,712.0*                 Brief Urine Lab Results  (Last result in the past 365 days)      Color   Clarity   Blood   Leuk Est   Nitrite   Protein   CREAT   Urine HCG        07/28/21 1703             44.3       07/28/21 1703 Yellow Hazy  Comment:  Result checked  Negative Large (3+) Negative 30 mg/dL (1+)             Microbiology Results (last 10 days)     Procedure Component Value - Date/Time    Eosinophil Smear - Urine, Urine, Clean Catch [[id number]]  (Normal) Collected: 07/28/21 1703    Lab Status: Final result Specimen: Urine, Clean Catch Updated: 07/28/21 1850     Eosinophil Smear 0 % EOS/100 Cells           XR Chest 1 View    Result Date: 7/20/2021  Impression: 1. There are mild bibasilar airspace  opacities and possible small pleural effusions. These could be due to edema or pneumonia. There has been slight improvement from 06/25/2021. 2. Cardiomegaly.  Electronically Signed By-Brittney Ureña MD On:7/20/2021 10:33 PM This report was finalized on [id number] by  Brittney Ureña MD.              Results for orders placed during the hospital encounter of 07/20/21    Adult Transthoracic Echo Complete With Contrast if Necessary Per Protocol    Interpretation Summary  · Estimated left ventricular EF = 30% Left ventricular systolic function is moderately decreased.    Occasions  Shortness of breath.    Technically satisfactory study.  Mitral valve is structurally normal.  Mild mitral regurgitation.  Tricuspid valve is structurally normal.  Aortic valve is structurally normal.  Pulmonic valve could not be well visualized.  No evidence for tricuspid or aortic regurgitation is seen by Doppler study.  Biatrial and biventricular enlargement is present.  Diffuse left ventricular hypocontractility with ejection fraction of 30%.  Atrial septum is intact.  Aorta is normal.  No pericardial effusion or intracardiac thrombus is seen.    Impression  Mild mitral regurgitation.  Significant biatrial and biventricular enlargement.  Diffuse left ventricular hypocontractility with ejection fraction of 30%.  No pericardial effusion or intracardiac thrombus is seen.      Labs Pending at Discharge:      Procedures Performed           Consults:   Consults     Date and Time Order Name Status Description    7/28/2021 12:31 PM Inpatient Nephrology Consult      7/27/2021  8:38 AM Inpatient Nephrology Consult      7/21/2021  7:30 PM Inpatient Cardiology Consult Completed     6/25/2021  9:24 PM Hospitalist (on-call MD unless specified) Completed             Discharge Details        Discharge Medications      New Medications      Instructions Start Date   metoprolol succinate  MG 24 hr tablet  Commonly known as: TOPROL-XL   100 mg, Oral,  Every 24 Hours Scheduled   Start Date: August 3, 2021     spironolactone 25 MG tablet  Commonly known as: ALDACTONE   25 mg, Oral, Daily   Start Date: August 3, 2021        Changes to Medications      Instructions Start Date   albuterol sulfate  (90 Base) MCG/ACT inhaler  Commonly known as: PROVENTIL HFA;VENTOLIN HFA;PROAIR HFA  What changed: Another medication with the same name was removed. Continue taking this medication, and follow the directions you see here.   2 puffs, Inhalation, Every 4 Hours PRN         Continue These Medications      Instructions Start Date   apixaban 5 MG tablet tablet  Commonly known as: ELIQUIS   5 mg, Oral, Daily      aspirin 81 MG chewable tablet   81 mg, Oral, Daily      atorvastatin 40 MG tablet  Commonly known as: LIPITOR   40 mg, Oral, Nightly      cholecalciferol 25 MCG (1000 UT) tablet  Commonly known as: VITAMIN D3   1,000 Units, Oral, Daily      dextrose 40 % gel  Commonly known as: GLUTOSE   Oral, Every 1 Hour PRN      donepezil 10 MG tablet  Commonly known as: ARICEPT   10 mg, Oral, Nightly      DULoxetine HCl 60 MG capsule  Commonly known as: DRIZALMA   60 mg, Oral, Daily      furosemide 40 MG tablet  Commonly known as: LASIX   40 mg, Oral, 2 Times Daily      gabapentin 100 MG capsule  Commonly known as: NEURONTIN   200 mg, Oral, 2 times daily      hydrALAZINE 50 MG tablet  Commonly known as: APRESOLINE   50 mg, Oral, 2 times daily, Hold for SBP <110      isosorbide mononitrate 60 MG 24 hr tablet  Commonly known as: IMDUR   60 mg, Oral, 2 Times Daily      nitroglycerin 0.4 MG SL tablet  Commonly known as: NITROSTAT   0.4 mg, Sublingual, Every 5 Minutes PRN, Take no more than 3 doses in 15 minutes.      tiotropium 18 MCG per inhalation capsule  Commonly known as: Spiriva HandiHaler   1 capsule, Inhalation, Daily - RT      vitamin B-12 1000 MCG tablet  Commonly known as: CYANOCOBALAMIN   1,000 mcg, Oral, Daily         Stop These Medications    atenolol 100 MG  tablet  Commonly known as: TENORMIN     clopidogrel 75 MG tablet  Commonly known as: PLAVIX     digoxin 125 MCG tablet  Commonly known as: LANOXIN     fish oil 1000 MG capsule capsule     Glucagon Emergency 1 MG kit     HYDROcodone-acetaminophen 7.5-325 MG per tablet  Commonly known as: NORCO     insulin NPH-insulin regular (70-30) 100 UNIT/ML injection  Commonly known as: humuLIN 70/30,novoLIN 70/30     ipratropium-albuterol 0.5-2.5 mg/3 ml nebulizer  Commonly known as: DUO-NEB     ondansetron 4 MG tablet  Commonly known as: ZOFRAN            Allergies   Allergen Reactions   • Codeine Itching         Discharge Disposition:  Skilled Nursing Facility (DC - External)    Diet:  Hospital:  Diet Order   Procedures   • Diet Cardiac, Diabetic/Consistent Carbs; Healthy Heart; Diabetic - Consistent Carb         Discharge Activity:   Activity Instructions     Activity as Tolerated              CODE STATUS:  Code Status and Medical Interventions:   Ordered at: 07/20/21 4709     Level Of Support Discussed With:    Patient     Code Status:    CPR     Medical Interventions (Level of Support Prior to Arrest):    Full         Future Appointments   Date Time Provider Department Center   9/16/2021  1:00 PM HERI Chung DPM [facility]   1/3/2022  2:50 PM Jose G Rm MD [facility]       Additional Instructions for the Follow-ups that You Need to Schedule     Discharge Follow-up with PCP   As directed       Currently Documented PCP:    Samantha Zabala APRN    PCP Phone Number:    [phone]     Follow Up Details: If no PCP, call MD finder at [phone]               Time spent on Discharge including face to face service: 35 minutes.  Care coordination with nursing for current care and discharge planning    This patient has been examined wearing appropriate Personal Protective Equipment . 08/02/21      Signature: Stanton Junior MD, FACP

## 2021-08-02 NOTE — PROGRESS NOTES
Referring Provider: Jeff Black MD    Reason for follow-up:  Shortness of breath  Cardiomyopathy  Status post stent  Congestive heart failure  Anticoagulation management  Atrial fibrillation with RVR     Patient Care Team:  Samantha Zabala APRN as PCP - General  Samantha Zabala APRN as PCP - Family Medicine  Jose G Rm MD as Consulting Physician (Cardiology)    Subjective .  Feeling okay     ROS    The patient has been without any chest discomfort ,shortness of breath, palpitations, dizziness or syncope.  Denies having any headache ,abdominal pain ,nausea, vomiting , diarrhea constipation, loss of weight or loss of appetite.  Denies having any excessive bruising ,hematuria or blood in the stool.    Review of all systems negative except as indicated    History  Past Medical History:   Diagnosis Date   • Appetite loss    • Brain bleed (CMS/HCC)    • Carpal tunnel syndrome on left     carpal tunnel on left hand   • Diabetic neuropathy (CMS/HCC)    • DM2 (diabetes mellitus, type 2) (CMS/HCC)    • History of angina    • Hyperlipidemia    • Hypogonadism in male    • Obesity    • Sleep apnea    • Stroke (CMS/HCC)    • Unsteady gait        Past Surgical History:   Procedure Laterality Date   • ANGIOPLASTY      X2   • APPENDECTOMY     • CARDIAC CATHETERIZATION  11/13/2015   • CARDIAC CATHETERIZATION N/A 5/24/2021    Procedure: Left Heart Cath and coronary angiogram;  Surgeon: Jose G Rm MD;  Location: [facility] CATH INVASIVE LOCATION;  Service: Cardiovascular;  Laterality: N/A;   • CARDIAC CATHETERIZATION  5/24/2021    Procedure: Saphenous Vein Graft;  Surgeon: Jose G Rm MD;  Location: [facility] CATH INVASIVE LOCATION;  Service: Cardiovascular;;   • CARDIAC CATHETERIZATION N/A 5/24/2021    Procedure: Percutaneous Coronary Intervention;  Surgeon: Bernabe Zambrano MD;  Location: [facility] CATH INVASIVE LOCATION;  Service: Cardiology;  Laterality: N/A;   • CARDIAC CATHETERIZATION N/A 5/24/2021    Procedure: Stent  NIELS coronary;  Surgeon: Bernabe Zambrano MD;  Location:  [facility] CATH INVASIVE LOCATION;  Service: Cardiology;  Laterality: N/A;   • CARDIAC CATHETERIZATION N/A 5/26/2021    Procedure: Percutaneous Coronary Intervention;  Surgeon: Bernabe Zambrano MD;  Location:  [facility] CATH INVASIVE LOCATION;  Service: Cardiovascular;  Laterality: N/A;   • CARDIAC CATHETERIZATION N/A 5/26/2021    Procedure: Stent NIELS bypass graft;  Surgeon: Bernabe Zambrano MD;  Location: [facility] CATH INVASIVE LOCATION;  Service: Cardiovascular;  Laterality: N/A;   • CARDIAC ELECTROPHYSIOLOGY PROCEDURE N/A 5/24/2021    Procedure: Temporary Pacemaker;  Surgeon: Bernabe Zambrano MD;  Location:  [facility] CATH INVASIVE LOCATION;  Service: Cardiology;  Laterality: N/A;   • CARDIAC ELECTROPHYSIOLOGY PROCEDURE N/A 5/26/2021    Procedure: Temporary Pacemaker;  Surgeon: Bernabe Zambrano MD;  Location: [facility] CATH INVASIVE LOCATION;  Service: Cardiovascular;  Laterality: N/A;   • CARPAL TUNNEL RELEASE Left 04/29/2018    carpal tunnel- lt hand// other hand surgeries    • CATARACT EXTRACTION, BILATERAL  2002    Dr. Lux Acosta   • CHOLECYSTECTOMY     • COLON RESECTION  2005   • CORONARY ANGIOPLASTY     • CORONARY ANGIOPLASTY WITH STENT PLACEMENT  11/13/2015    PTCA stent to proximal in stent and mid to distal lad   • CORONARY ANGIOPLASTY WITH STENT PLACEMENT  09/16/2016    PTCA stent to mid lad and stent to vein graft to marginal   • CORONARY ARTERY BYPASS GRAFT  2005    @ Health system   • CYST REMOVAL      cyst removed from scrotum   • FOOT SURGERY Right 07/17/2018   • FOOT SURGERY Left 02/04/2019   • TOE AMPUTATION Right     right toe removed D/T infected cut that went to the bone       Family History   Problem Relation Age of Onset   • Heart disease Mother    • Heart disease Father    • Diabetes Sister    • Heart disease Sister    • Diabetes Brother    • Mental illness Brother        Social History     Tobacco Use   • Smoking status: Former Smoker     Packs/day: 1.00     Years: 60.00      Pack years: 60.00     Types: Cigarettes   • Smokeless tobacco: Never Used   • Tobacco comment: Patient quit smoking 11/2020   Vaping Use   • Vaping Use: Never used   Substance Use Topics   • Alcohol use: No   • Drug use: Yes     Frequency: 1.0 times per week     Types: Marijuana     Comment: socially        Medications Prior to Admission   Medication Sig Dispense Refill Last Dose   • albuterol (PROVENTIL) (5 MG/ML) 0.5% nebulizer solution Take 2.5 mg by nebulization Every 4 (Four) Hours As Needed.   7/20/2021 at Unknown time   • albuterol sulfate  (90 Base) MCG/ACT inhaler Inhale 2 puffs Every 4 (Four) Hours As Needed.   7/20/2021 at Unknown time   • apixaban (ELIQUIS) 5 MG tablet tablet Take 5 mg by mouth Daily.   7/20/2021 at Unknown time   • aspirin 81 MG chewable tablet Chew 81 mg Daily.   7/20/2021 at Unknown time   • atenolol (TENORMIN) 100 MG tablet Take 100 mg by mouth Daily. Hold SBP < 100 or HR < 60   7/20/2021 at Unknown time   • atorvastatin (LIPITOR) 40 MG tablet Take 40 mg by mouth Every Night.   Past Week at Unknown time   • cholecalciferol (VITAMIN D3) 25 MCG (1000 UT) tablet Take 1,000 Units by mouth Daily.   7/20/2021 at Unknown time   • clopidogrel (PLAVIX) 75 MG tablet Take 1 tablet by mouth Daily. 30 tablet 0 7/20/2021 at Unknown time   • digoxin (LANOXIN) 125 MCG tablet Take 125 mcg by mouth Daily. In the afternoon   7/20/2021 at Unknown time   • donepezil (ARICEPT) 10 MG tablet Take 10 mg by mouth Every Night.   7/20/2021 at Unknown time   • DULoxetine HCl (DRIZALMA) 60 MG capsule Take 60 mg by mouth Daily.   7/20/2021 at Unknown time   • furosemide (LASIX) 40 MG tablet Take 1 tablet by mouth 2 (Two) Times a Day for 30 days. 60 tablet 0 7/20/2021 at Unknown time   • gabapentin (NEURONTIN) 100 MG capsule Take 2 capsules by mouth 2 (two) times a day. 6 capsule 0 7/20/2021 at Unknown time   • hydrALAZINE (APRESOLINE) 50 MG tablet Take 50 mg by mouth 2 (two) times a day. Hold for SBP  <110   7/20/2021 at Unknown time   • HYDROcodone-acetaminophen (NORCO) 7.5-325 MG per tablet Take 1 tablet by mouth Every 12 (Twelve) Hours As Needed for Severe Pain . 10 tablet 0 7/20/2021 at Unknown time   • insulin NPH-insulin regular (humuLIN 70/30,novoLIN 70/30) (70-30) 100 UNIT/ML injection Inject 30 Units under the skin into the appropriate area as directed 2 (Two) Times a Day With Meals.   7/20/2021 at Unknown time   • ipratropium-albuterol (DUO-NEB) 0.5-2.5 mg/3 ml nebulizer Take 3 mL by nebulization Every 4 (Four) Hours As Needed for Wheezing.   Past Week at Unknown time   • isosorbide mononitrate (IMDUR) 60 MG 24 hr tablet Take 60 mg by mouth 2 (Two) Times a Day.   7/20/2021 at Unknown time   • Omega-3 Fatty Acids (fish oil) 1000 MG capsule capsule Take 1,000 mg by mouth Daily.   7/20/2021 at Unknown time   • tiotropium (Spiriva HandiHaler) 18 MCG per inhalation capsule Place 1 capsule into inhaler and inhale Daily. 30 capsule 1 7/20/2021 at Unknown time   • vitamin B-12 (CYANOCOBALAMIN) 1000 MCG tablet Take 1,000 mcg by mouth Daily.   7/20/2021 at Unknown time   • dextrose (GLUTOSE) 40 % gel Take  by mouth Every 1 (One) Hour As Needed for Low Blood Sugar.   Unknown at Unknown time   • Glucagon, rDNA, (Glucagon Emergency) 1 MG kit Inject 1 mg as directed 1 (One) Time As Needed (hypoglycemia).   Unknown at Unknown time   • nitroglycerin (NITROSTAT) 0.4 MG SL tablet Place 1 tablet under the tongue Every 5 (Five) Minutes As Needed for Chest Pain (Only if SBP Greater Than 100). Take no more than 3 doses in 15 minutes. 30 tablet 12 Unknown at Unknown time   • ondansetron (ZOFRAN) 4 MG tablet Take 1 tablet by mouth Every 6 (Six) Hours As Needed for Nausea or Vomiting. 30 tablet 0 Unknown at Unknown time       Allergies  Codeine    Scheduled Meds:apixaban, 5 mg, Oral, Q12H  aspirin, 81 mg, Oral, Daily  atorvastatin, 40 mg, Oral, Nightly  cholecalciferol, 1,000 Units, Oral, Daily  clopidogrel, 75 mg, Oral,  "Daily  donepezil, 10 mg, Oral, Nightly  DULoxetine, 60 mg, Oral, Daily  furosemide, 40 mg, Oral, BID  gabapentin, 200 mg, Oral, Q12H  hydrALAZINE, 50 mg, Oral, Q12H  insulin lispro, 0-7 Units, Subcutaneous, TID AC  insulin NPH-insulin regular, 30 Units, Subcutaneous, BID With Meals  isosorbide mononitrate, 60 mg, Oral, BID - Nitrates  metoprolol succinate XL, 100 mg, Oral, Q24H  sodium chloride, 10 mL, Intravenous, Q12H  spironolactone, 25 mg, Oral, Daily  vitamin B-12, 1,000 mcg, Oral, Daily      Continuous Infusions:dilTIAZem, 5-15 mg/hr, Last Rate: Stopped (08/01/21 0130)  DOBUTamine, 5 mcg/kg/min, Last Rate: Stopped (07/30/21 0949)      PRN Meds:.•  albuterol  •  dextrose  •  dextrose  •  glucagon (human recombinant)  •  HYDROcodone-acetaminophen  •  insulin lispro **AND** insulin lispro  •  ipratropium-albuterol  •  nitroglycerin  •  ondansetron  •  sodium chloride  •  sodium chloride    Objective     VITAL SIGNS  Vitals:    08/01/21 2054 08/01/21 2238 08/01/21 2242 08/02/21 0212   BP:  126/58  117/58   BP Location:    Left arm   Patient Position:    Lying   Pulse: 63 64     Resp: 18  17 17   Temp:    96.3 °F (35.7 °C)   TempSrc:    Axillary   SpO2: 100%   100%   Weight:       Height:           Flowsheet Rows      First Filed Value   Admission Height  177.8 cm (70\") Documented at 07/20/2021 2059   Admission Weight  114 kg (251 lb) Documented at 07/20/2021 2059            Intake/Output Summary (Last 24 hours) at 8/2/2021 0659  Last data filed at 8/2/2021 0200  Gross per 24 hour   Intake 1600 ml   Output 1400 ml   Net 200 ml        TELEMETRY: Atrial fibrillation    Physical Exam:  The patient is alert, oriented and in no distress.  Vital signs as noted above.  Exogenous obesity.  Head and neck revealed no carotid bruits or jugular venous distention.  No thyromegaly or lymphadenopathy is present  Lungs clear.  No wheezing.  Breath sounds are normal bilaterally.  Heart normal first and second heart sounds.  No " murmur. No precordial rub is present.  No gallop is present.  Abdomen soft and nontender.  No organomegaly is present.  Extremities with good peripheral pulses without any pedal edema.  Skin warm and dry.  Musculoskeletal system is grossly normal  CNS grossly normal      Results Review:   I reviewed the patient's new clinical results.  Lab Results (last 24 hours)     Procedure Component Value Units Date/Time    Basic Metabolic Panel [[id number]]  (Abnormal) Collected: 08/02/21 0244    Specimen: Blood Updated: 08/02/21 0354     Glucose 92 mg/dL      BUN 64 mg/dL      Creatinine 1.95 mg/dL      Sodium 137 mmol/L      Potassium 4.6 mmol/L      Chloride 97 mmol/L      CO2 34.0 mmol/L      Calcium 9.1 mg/dL      eGFR Non African Amer 34 mL/min/1.73      BUN/Creatinine Ratio 32.8     Anion Gap 6.0 mmol/L     Narrative:      GFR Normal >60  Chronic Kidney Disease <60  Kidney Failure <15      POC Glucose Once [[id number]]  (Abnormal) Collected: 08/01/21 2030    Specimen: Blood Updated: 08/01/21 2032     Glucose 133 mg/dL      Comment: Serial Number: [id number]:  [id number]       POC Glucose Once [[id number]]  (Abnormal) Collected: 08/01/21 1609    Specimen: Blood Updated: 08/01/21 1635     Glucose 130 mg/dL      Comment: Serial Number: [id number]:  [id number]       POC Glucose Once [[id number]]  (Abnormal) Collected: 08/01/21 1154    Specimen: Blood Updated: 08/01/21 1157     Glucose 162 mg/dL      Comment: Serial Number: [id number]:  [id number]             Imaging Results (Last 24 Hours)     ** No results found for the last 24 hours. **      LAB RESULTS (LAST 7 DAYS)    CBC  Results from last 7 days   Lab Units 07/31/21  0238 07/27/21  0252   WBC 10*3/mm3 6.00 6.90   RBC 10*6/mm3 2.76* 2.74*   HEMOGLOBIN g/dL 7.7* 7.7*   HEMATOCRIT % 23.8* 23.9*   MCV fL 86.3 87.3   PLATELETS 10*3/mm3 247 269       BMP  Results from last 7 days   Lab Units 08/02/21  0244 08/01/21  0305 07/31/21  0238 07/30/21  0225  07/29/21  0322 07/28/21 0232 07/27/21  0252   SODIUM mmol/L 137 141 139 138 138 137 137   POTASSIUM mmol/L 4.6 4.7 4.6 4.4 4.1 3.9 4.0   CHLORIDE mmol/L 97* 100 100 97* 98 96* 96*   CO2 mmol/L 34.0* 33.0* 31.0* 31.0* 32.0* 32.0* 31.0*   BUN mg/dL 64* 55* 51* 52* 50* 49* 49*   CREATININE mg/dL 1.95* 1.99* 1.86* 1.98* 2.07* 2.34* 2.27*   GLUCOSE mg/dL 92 64* 99 93 89 76 126*   MAGNESIUM mg/dL  --   --   --  2.2  --   --  2.4       CMP   Results from last 7 days   Lab Units 08/02/21  0244 08/01/21  0305 07/31/21 0238 07/30/21 0225 07/29/21 0322 07/28/21 0232 07/27/21  0252   SODIUM mmol/L 137 141 139 138 138 137 137   POTASSIUM mmol/L 4.6 4.7 4.6 4.4 4.1 3.9 4.0   CHLORIDE mmol/L 97* 100 100 97* 98 96* 96*   CO2 mmol/L 34.0* 33.0* 31.0* 31.0* 32.0* 32.0* 31.0*   BUN mg/dL 64* 55* 51* 52* 50* 49* 49*   CREATININE mg/dL 1.95* 1.99* 1.86* 1.98* 2.07* 2.34* 2.27*   GLUCOSE mg/dL 92 64* 99 93 89 76 126*         BNP        TROPONIN        CoAg        Creatinine Clearance  Estimated Creatinine Clearance: 46.8 mL/min (A) (by C-G formula based on SCr of 1.95 mg/dL (H)).    ABG        Radiology  No radiology results for the last day            EKG                I personally viewed and interpreted the patient's EKG/Telemetry data: Atrial fibrillation  EKG showed possible sinus rhythm with first-degree AV block.    ECHOCARDIOGRAM:    Results for orders placed during the hospital encounter of 07/20/21    Adult Transthoracic Echo Complete With Contrast if Necessary Per Protocol    Interpretation Summary  · Estimated left ventricular EF = 30% Left ventricular systolic function is moderately decreased.    Occasions  Shortness of breath.    Technically satisfactory study.  Mitral valve is structurally normal.  Mild mitral regurgitation.  Tricuspid valve is structurally normal.  Aortic valve is structurally normal.  Pulmonic valve could not be well visualized.  No evidence for tricuspid or aortic regurgitation is seen by Doppler  study.  Biatrial and biventricular enlargement is present.  Diffuse left ventricular hypocontractility with ejection fraction of 30%.  Atrial septum is intact.  Aorta is normal.  No pericardial effusion or intracardiac thrombus is seen.    Impression  Mild mitral regurgitation.  Significant biatrial and biventricular enlargement.  Diffuse left ventricular hypocontractility with ejection fraction of 30%.  No pericardial effusion or intracardiac thrombus is seen.          STRESS MYOVIEW:    Cardiolite (Tc-99m Sestamibi) stress test    CARDIAC CATHETERIZATION:            OTHER:         Assessment/Plan     Principal Problem:    Acute on chronic systolic heart failure (CMS/HCC)  Active Problems:    Essential hypertension    Hyperlipidemia    History of cerebrovascular accident    Hemiplegia, unspecified affecting right dominant side (CMS/HCC)    Coronary artery disease    Chronic left-sided low back pain without sciatica    Type 2 diabetes mellitus with hyperglycemia (CMS/[facility])    CKD (chronic kidney disease) stage 3, GFR 30-59 ml/min (CMS/[facility])    Tobacco use disorder    Nonsustained ventricular tachycardia (CMS/[facility])    Atrial fibrillation with RVR (CMS/[facility])    [[[[[[[[[[[[[[[[[[[[[[[[[    Impression  ==============  -Increased shortness of breath     -Acute on chronic congestive heart failure.  Recent echocardiogram showed left ventricle dysfunction with ejection fraction of 35%.     -History of right-sided weakness with left MCA territory stroke.-Improved      -status post CABG 2005. status post stent to LAD 2009    Status post stent to LAD 11/13/2015  Status post stent to mid LAD and SVG to marginal branch 09/16/2016.  Status post stent to mid LAD 5/24/2021  Status post stent to SVG to RCA 5/26/2021     Cardiac catheterization 5/24/2021 revealed  Left ventricle angiogram was not performed due to pre-existing renal dysfunction.  Left main coronary artery normal.  Left anterior descending artery has mid segment lengthy  90% disease within the previously placed stent.  Distal left into descending artery has diffuse disease.  Circumflex coronary artery is totally occluded.  (Chronic)  Right coronary artery is chronically occluded.  SVG to marginal branch (jump graft to OM1 and OM 2) is totally occluded (new finding compared to 2015.  SVG to PDA has distal radiolucency.  However no definite obstructive disease is present.       -status post myocardial infarction 2000 and 2002 .      -history of intermittent atrial fibrillation-maintaining sinus rhythm     Transesophageal echocardiogram 11/30/2020.  Biatrial enlargement.  Smoking effect in the left atrium and left ventricle and left atrial appendage.  Mild mitral and aortic regurgitation.  Left ventricle enlargement with diffuse hypocontractility with ejection fraction of 35 to 40%.     Echocardiogram 11/27/2020 revealed left atrial enlargement left ventricle dysfunction with ejection fraction of 40%.  Negative bubble study.      -diabetes hypertension and sleep apnea in history of renal dysfunction .  Recent BUN/creatinine 38/1.36     -status post colon surgery (partial colectomy) appendectomy cholecystectomy right 4th toe removal and carpal tunnel surgery      -continued smoking 1 pack per day -abstinence from smoking      -allergy to codeine.  ============   Plan  ================  Status post CABG  Patient is not having any angina pectoris     Acute on chronic systolic congestive heart failure.  Improved  I patient is off intravenous dobutamine.    History of Spontaneous prolonged episode of asystole is of concern.  Patient had recovered without any intervention or CPR.  We will closely observe for any bradycardia arrhythmias and patient may need pacemaker/ICD implantation.  No further episodes.     Status post stent to LAD 5/24/2021.  Status post stent to SVG to RCA 5/26/2021.     Paroxysmal atrial fibrillation.  Patient is mostly in sinus rhythm.       Renal dysfunction  stable  Latest BUN/creatinine 43/1.6-6/2/2021  39/1.9-6/28/2021  42/1,82-6/29/2021  32/1.88-7/22/2021  33/1.98-7/23/2021  47/2.23-7/26/2021  49/2.27-7/27/2021  49/2.34-7/28/2021  50/2.07-7/29/2021  52/1.98-7/30/2021  64/1.95-8/2/2021  Renal consultation and follow-up appreciated  Patient with CKD secondary to underlying diabetic nephropathy with proteinuria acute kidney injury multifactorial patient does have urinary tract infection will start on IV Rocephin manage electrolyte renal function and acid-base .    Anemia  Latest hemoglobin  8.4-5/28/2021  Hemoglobin 8.9-6/28/2021  Hemoglobin 8.7-7/22/2021  8.9-7/26/2021  7.7-7/27/2021     Anticoagulation  Patient is on Eliquis.    Medications were reviewed and updated.  Current medications include Eliquis aspirin atorvastatin Plavix Aricept Lasix 40 mg twice a day hydralazine 50 mg twice a day insulin metoprolol  mg once a day Imdur 60 mg twice a day Spironolactone 25 mg a day.     Further plan will depend on patient's progress.   ]]]]]]]]]]]]]]]]]]]]]]            Jose G Rm MD  08/02/21  06:59 EDT

## 2021-08-02 NOTE — THERAPY TREATMENT NOTE
Subjective: Pt agreeable to therapeutic plan of care.  Cognition: oriented to Person, Place, Time and Situation    Objective:     Bed Mobility: Min-A and Mod-A  Functional Transfers: Min-A, Mod-A and with rolling walker  Functional Ambulation: CGA, Min-A and with rolling walker    Upper Body Dressing and Lower Body Dressing: Min-A and Max-A  ADL Position: long sitting  ADL Comments: Dressing completed in prep for getting OOB.     Grooming and Feeding: Min-A and setup/Mod I  ADL Position: supported sitting  ADL Comments: Pt requires min A for hair grooming due to prolonged positioning of BUEs. Pt requires setup for feeding. Pt assisted with ordering his tray 2/2 difficulty seeing his menu. RN notified.    Vitals: Pt's  HF 02 was connected to standard O2 extension. His 02 sats were 87%. OT replaced HF NC back to tree and he was able to rebound back to 100% on 4L HF. BP was 122/58 (73) while at chair level.      Pain: 4 VAS  Education: Provided education on importance of mobility and skilled verbal / tactile cueing throughout intervention.     Assessment: Benson Mclean presents with ADL impairments below baseline abilities which indicate the need for continued skilled intervention while inpatient. Tolerating session today without incident. Will continue to follow and progress as tolerated.     Plan/Recommendations:   Pt would benefit from Skilled Rehab placement at discharge from facility.   Pt desires Skilled Rehab placement at discharge. Pt cooperative; agreeable to therapeutic recommendations and plan of care.     Modified [place]: 4 = Moderately severe disability (Unable to attend to own bodily needs without assistance, and unable to walk unassisted)     Post-Tx Position: Up in Chair, Alarms activated and Call light and personal items within reach  PPE: gloves, surgical mask, eyewear protection

## 2021-08-02 NOTE — PLAN OF CARE
Goal Outcome Evaluation:     Pt continues to demonstrate good progress towards all ADL goals, but remains limited by impaired endurance, impaired balance, generalized weakness and acuity of illness. OT recommends continued treatment at 3x/week and d/c back to SNF for skilled therapy services.

## 2021-08-02 NOTE — CASE MANAGEMENT/SOCIAL WORK
Continued Stay Note  DOREEN Amin     Patient Name: Benson Mclean  MRN: [id number]  Today's Date: 8/2/2021    Admit Date: 7/20/2021    Discharge Plan     Row Name 08/02/21 1646       Plan    Plan  Anticipate return to [facility] (was LTC) Precert approved 8/2. No PASRR needed. Pharm updated to reflect [place] ([facility])    Patient/Family in Agreement with Plan  yes    Plan Comments  CM received notification that patient precert has been approved. DC barriers: cardizem gtt, dobutamine gtt, on 2L nc, cards following        Expected Discharge Date and Time     Expected Discharge Date Expected Discharge Time    Aug 3, 2021         Phone communication or documentation only - no physical contact with patient or family.      Charlene Garcia RN

## 2021-08-02 NOTE — PROGRESS NOTES
[facility]     RENAL/[facility] Progress Note    Patient Name: Benson Mclean  : 1950  MRN: [id number]  Primary Care Physician:  Samantha Zabala APRN  Date of admission: 2021    Subjective   Subjective     Chief Complaint: PORSHA on CKD III    History of Present Illness  Patient with acute on ckd III baseline cr 1.3-1.6.  Admitted with shortness of breath and CHF    Review of Systems   Cardiovascular: Positive for chest pain.       Objective   Objective     Vitals:   Temp:  [96.3 °F (35.7 °C)-97.8 °F (36.6 °C)] 97.8 °F (36.6 °C)  Heart Rate:  [] 100  Resp:  [17-20] 20  BP: (117-134)/(52-58) 134/53  Flow (L/min):  [2-3] 3    Physical Exam   General Appearance:  In no acute distress.    HEENT: Normal HEENT exam.     Extremities:  There is no deformity, effusion or dependent edema.    Neurological: Patient is alert        Result Review    Result Review:  I have personally reviewed the results from the time of this admission to 2021 14:52 EDT and agree with these findings:  [x]  Laboratory  []  Microbiology  []  Radiology  []  EKG/Telemetry   []  Cardiology/Vascular   []  Pathology  []  Old records  []  Other:      Assessment/Plan   Assessment / Plan     Brief Patient Summary:  Benson Mclean is a 70 y.o. male who has history of ckd III baseline cr 1.3-1.6 admitted with chf and porsha    Active Hospital Problems:  Active Hospital Problems    Diagnosis    • **Acute on chronic systolic heart failure (CMS/HCC)    • Atrial fibrillation with RVR (CMS/HCC)    • Nonsustained ventricular tachycardia (CMS/HCC)    • Tobacco use disorder    • CKD (chronic kidney disease) stage 3, GFR 30-59 ml/min (CMS/HCC)    • Type 2 diabetes mellitus with hyperglycemia (CMS/HCC)    • Hyperlipidemia    • History of cerebrovascular accident    • Hemiplegia, unspecified affecting right dominant side (CMS/HCC)    • Essential hypertension    • Coronary artery disease    • Chronic left-sided low back pain without sciatica      Last  Assessment & Plan:   Formatting of this note might be different from the original.  Stable on prn hydrocodone       Plan:   1. DILEEP  2. CKD III  3. CHF  4. T2DM  5. Hypertension    Cr stable at 1.9 = possibly new baseline.  Lytes OK and volume stable.  Continue BID Lasix + Aldactone.  To rehab eventually.    Yony Bhat M.D.  [facility]

## 2021-08-02 NOTE — PLAN OF CARE
Problem: Fall Injury Risk  Goal: Absence of Fall and Fall-Related Injury  Outcome: Ongoing, Progressing     Problem: Adult Inpatient Plan of Care  Goal: Plan of Care Review  Outcome: Ongoing, Progressing  Flowsheets  Taken 8/2/2021 1429 by Brittney Aj RN  Progress: no change  Outcome Summary: Pt complains of no pain at this time.  Pt currently sitting in chair  On 3L nasal cannula.  VSS  Will continue to monitor  Taken 8/1/2021 1742 by Sturgeon, Valerie, LPN  Plan of Care Reviewed With: patient  Goal: Patient-Specific Goal (Individualized)  Outcome: Ongoing, Progressing  Goal: Absence of Hospital-Acquired Illness or Injury  Outcome: Ongoing, Progressing  Goal: Optimal Comfort and Wellbeing  Outcome: Ongoing, Progressing  Goal: Readiness for Transition of Care  Outcome: Ongoing, Progressing     Problem: Skin Injury Risk Increased  Goal: Skin Health and Integrity  Outcome: Ongoing, Progressing     Problem: Adjustment to Illness (Heart Failure)  Goal: Optimal Coping  Outcome: Ongoing, Progressing     Problem: Arrhythmia/Dysrhythmia (Heart Failure)  Goal: Stable Heart Rate and Rhythm  Outcome: Ongoing, Progressing     Problem: Cardiac Output Decreased (Heart Failure)  Goal: Optimal Cardiac Output  Outcome: Ongoing, Progressing     Problem: Fluid Imbalance (Heart Failure)  Goal: Fluid Balance  Outcome: Ongoing, Progressing     Problem: Functional Ability Impaired (Heart Failure)  Goal: Optimal Functional Ability  Outcome: Ongoing, Progressing     Problem: Oral Intake Inadequate (Heart Failure)  Goal: Optimal Nutrition Intake  Outcome: Ongoing, Progressing     Problem: Respiratory Compromise (Heart Failure)  Goal: Effective Oxygenation and Ventilation  Outcome: Ongoing, Progressing     Problem: Sleep Disordered Breathing (Heart Failure)  Goal: Effective Breathing Pattern During Sleep  Outcome: Ongoing, Progressing   Goal Outcome Evaluation:           Progress: no change  Outcome Summary: Pt complains of no pain  at this time.  Pt currently sitting in chair  On 3L nasal cannula.  VSS  Will continue to monitor

## 2021-08-03 ENCOUNTER — APPOINTMENT (OUTPATIENT)
Dept: GENERAL RADIOLOGY | Facility: HOSPITAL | Age: 71
End: 2021-08-03

## 2021-08-03 ENCOUNTER — HOSPITAL ENCOUNTER (INPATIENT)
Facility: HOSPITAL | Age: 71
LOS: 22 days | Discharge: NURSING, REHAB OR LONG-TERM CARE | End: 2021-08-25
Attending: EMERGENCY MEDICINE | Admitting: INTERNAL MEDICINE

## 2021-08-03 DIAGNOSIS — R06.00 DYSPNEA, UNSPECIFIED TYPE: ICD-10-CM

## 2021-08-03 DIAGNOSIS — R00.0 TACHYCARDIA: Primary | ICD-10-CM

## 2021-08-03 PROBLEM — I48.91 A-FIB (HCC): Status: ACTIVE | Noted: 2021-08-03

## 2021-08-03 LAB
ANION GAP SERPL CALCULATED.3IONS-SCNC: 12 MMOL/L (ref 5–15)
BASOPHILS # BLD AUTO: 0.1 10*3/MM3 (ref 0–0.2)
BASOPHILS NFR BLD AUTO: 1.5 % (ref 0–1.5)
BUN SERPL-MCNC: 68 MG/DL (ref 8–23)
BUN/CREAT SERPL: 35.6 (ref 7–25)
CALCIUM SPEC-SCNC: 9.4 MG/DL (ref 8.6–10.5)
CHLORIDE SERPL-SCNC: 95 MMOL/L (ref 98–107)
CO2 SERPL-SCNC: 32 MMOL/L (ref 22–29)
CREAT SERPL-MCNC: 1.91 MG/DL (ref 0.76–1.27)
DEPRECATED RDW RBC AUTO: 47.7 FL (ref 37–54)
EOSINOPHIL # BLD AUTO: 0.4 10*3/MM3 (ref 0–0.4)
EOSINOPHIL NFR BLD AUTO: 5.1 % (ref 0.3–6.2)
ERYTHROCYTE [DISTWIDTH] IN BLOOD BY AUTOMATED COUNT: 15.5 % (ref 12.3–15.4)
GFR SERPL CREATININE-BSD FRML MDRD: 35 ML/MIN/1.73
GLUCOSE SERPL-MCNC: 198 MG/DL (ref 65–99)
HCT VFR BLD AUTO: 24.5 % (ref 37.5–51)
HGB BLD-MCNC: 8 G/DL (ref 13–17.7)
LYMPHOCYTES # BLD AUTO: 1.3 10*3/MM3 (ref 0.7–3.1)
LYMPHOCYTES NFR BLD AUTO: 17.3 % (ref 19.6–45.3)
MCH RBC QN AUTO: 28.5 PG (ref 26.6–33)
MCHC RBC AUTO-ENTMCNC: 32.8 G/DL (ref 31.5–35.7)
MCV RBC AUTO: 86.6 FL (ref 79–97)
MONOCYTES # BLD AUTO: 0.7 10*3/MM3 (ref 0.1–0.9)
MONOCYTES NFR BLD AUTO: 10.2 % (ref 5–12)
NEUTROPHILS NFR BLD AUTO: 4.9 10*3/MM3 (ref 1.7–7)
NEUTROPHILS NFR BLD AUTO: 65.9 % (ref 42.7–76)
NRBC BLD AUTO-RTO: 0.1 /100 WBC (ref 0–0.2)
NT-PROBNP SERPL-MCNC: 7648 PG/ML (ref 0–900)
PLATELET # BLD AUTO: 334 10*3/MM3 (ref 140–450)
PMV BLD AUTO: 8.2 FL (ref 6–12)
POTASSIUM SERPL-SCNC: 4.5 MMOL/L (ref 3.5–5.2)
RBC # BLD AUTO: 2.82 10*6/MM3 (ref 4.14–5.8)
SARS-COV-2 RNA PNL SPEC NAA+PROBE: NOT DETECTED
SODIUM SERPL-SCNC: 139 MMOL/L (ref 136–145)
TROPONIN T SERPL-MCNC: 0.12 NG/ML (ref 0–0.03)
TROPONIN T SERPL-MCNC: 0.41 NG/ML (ref 0–0.03)
WBC # BLD AUTO: 7.4 10*3/MM3 (ref 3.4–10.8)

## 2021-08-03 PROCEDURE — 25010000002 FUROSEMIDE PER 20 MG: Performed by: NURSE PRACTITIONER

## 2021-08-03 PROCEDURE — 36415 COLL VENOUS BLD VENIPUNCTURE: CPT | Performed by: NURSE PRACTITIONER

## 2021-08-03 PROCEDURE — 84484 ASSAY OF TROPONIN QUANT: CPT | Performed by: EMERGENCY MEDICINE

## 2021-08-03 PROCEDURE — 71045 X-RAY EXAM CHEST 1 VIEW: CPT

## 2021-08-03 PROCEDURE — 93005 ELECTROCARDIOGRAM TRACING: CPT

## 2021-08-03 PROCEDURE — 85025 COMPLETE CBC W/AUTO DIFF WBC: CPT | Performed by: EMERGENCY MEDICINE

## 2021-08-03 PROCEDURE — G0378 HOSPITAL OBSERVATION PER HR: HCPCS

## 2021-08-03 PROCEDURE — 83880 ASSAY OF NATRIURETIC PEPTIDE: CPT | Performed by: EMERGENCY MEDICINE

## 2021-08-03 PROCEDURE — 93005 ELECTROCARDIOGRAM TRACING: CPT | Performed by: NURSE PRACTITIONER

## 2021-08-03 PROCEDURE — 99222 1ST HOSP IP/OBS MODERATE 55: CPT | Performed by: NURSE PRACTITIONER

## 2021-08-03 PROCEDURE — 93005 ELECTROCARDIOGRAM TRACING: CPT | Performed by: EMERGENCY MEDICINE

## 2021-08-03 PROCEDURE — 99285 EMERGENCY DEPT VISIT HI MDM: CPT

## 2021-08-03 PROCEDURE — 80048 BASIC METABOLIC PNL TOTAL CA: CPT | Performed by: EMERGENCY MEDICINE

## 2021-08-03 PROCEDURE — 87635 SARS-COV-2 COVID-19 AMP PRB: CPT | Performed by: EMERGENCY MEDICINE

## 2021-08-03 PROCEDURE — 84484 ASSAY OF TROPONIN QUANT: CPT | Performed by: NURSE PRACTITIONER

## 2021-08-03 RX ORDER — ACETAMINOPHEN 650 MG/1
650 SUPPOSITORY RECTAL EVERY 4 HOURS PRN
Status: DISCONTINUED | OUTPATIENT
Start: 2021-08-03 | End: 2021-08-25 | Stop reason: HOSPADM

## 2021-08-03 RX ORDER — SODIUM CHLORIDE 0.9 % (FLUSH) 0.9 %
10 SYRINGE (ML) INJECTION AS NEEDED
Status: DISCONTINUED | OUTPATIENT
Start: 2021-08-03 | End: 2021-08-25 | Stop reason: HOSPADM

## 2021-08-03 RX ORDER — ACETAMINOPHEN 325 MG/1
650 TABLET ORAL EVERY 4 HOURS PRN
Status: DISCONTINUED | OUTPATIENT
Start: 2021-08-03 | End: 2021-08-25 | Stop reason: HOSPADM

## 2021-08-03 RX ORDER — ONDANSETRON 4 MG/1
4 TABLET, FILM COATED ORAL EVERY 6 HOURS PRN
Status: DISCONTINUED | OUTPATIENT
Start: 2021-08-03 | End: 2021-08-25 | Stop reason: HOSPADM

## 2021-08-03 RX ORDER — ONDANSETRON 2 MG/ML
4 INJECTION INTRAMUSCULAR; INTRAVENOUS EVERY 6 HOURS PRN
Status: DISCONTINUED | OUTPATIENT
Start: 2021-08-03 | End: 2021-08-25 | Stop reason: HOSPADM

## 2021-08-03 RX ORDER — CHOLECALCIFEROL (VITAMIN D3) 125 MCG
5 CAPSULE ORAL NIGHTLY PRN
Status: DISCONTINUED | OUTPATIENT
Start: 2021-08-03 | End: 2021-08-25 | Stop reason: HOSPADM

## 2021-08-03 RX ORDER — SODIUM CHLORIDE 0.9 % (FLUSH) 0.9 %
10 SYRINGE (ML) INJECTION EVERY 12 HOURS SCHEDULED
Status: DISCONTINUED | OUTPATIENT
Start: 2021-08-03 | End: 2021-08-25 | Stop reason: HOSPADM

## 2021-08-03 RX ORDER — FUROSEMIDE 10 MG/ML
40 INJECTION INTRAMUSCULAR; INTRAVENOUS EVERY 8 HOURS
Status: DISCONTINUED | OUTPATIENT
Start: 2021-08-03 | End: 2021-08-04

## 2021-08-03 RX ORDER — ALUMINA, MAGNESIA, AND SIMETHICONE 2400; 2400; 240 MG/30ML; MG/30ML; MG/30ML
15 SUSPENSION ORAL EVERY 6 HOURS PRN
Status: DISCONTINUED | OUTPATIENT
Start: 2021-08-03 | End: 2021-08-25 | Stop reason: HOSPADM

## 2021-08-03 RX ORDER — NITROGLYCERIN 0.4 MG/1
0.4 TABLET SUBLINGUAL
Status: DISCONTINUED | OUTPATIENT
Start: 2021-08-03 | End: 2021-08-25 | Stop reason: HOSPADM

## 2021-08-03 RX ORDER — ACETAMINOPHEN 160 MG/5ML
650 SOLUTION ORAL EVERY 4 HOURS PRN
Status: DISCONTINUED | OUTPATIENT
Start: 2021-08-03 | End: 2021-08-25 | Stop reason: HOSPADM

## 2021-08-03 RX ADMIN — FUROSEMIDE 40 MG: 10 INJECTION, SOLUTION INTRAMUSCULAR; INTRAVENOUS at 23:12

## 2021-08-03 RX ADMIN — SODIUM CHLORIDE 5 MG/HR: 900 INJECTION, SOLUTION INTRAVENOUS at 19:58

## 2021-08-03 RX ADMIN — Medication 10 ML: at 23:12

## 2021-08-03 NOTE — CASE MANAGEMENT/SOCIAL WORK
Case Management Discharge Note      Final Note: [place]    Provided Post Acute Provider List?: N/A    Selected Continued Care - Discharged on 8/2/2021 Admission date: 7/20/2021 - Discharge disposition: Skilled Nursing Facility (DC - External)    Destination Coordination complete.    Service Provider Selected Services Address Phone Fax Patient Preferred    [facility]  Skilled Nursing [address] [facility] [place] [address] [phone] --                Selected Continued Care - Prior Encounters Includes selections from prior encounters from 4/21/2021 to 8/2/2021    Discharged on 7/2/2021 Admission date: 6/25/2021 - Discharge disposition: Skilled Nursing Facility (DC - External)    Destination     Service Provider Selected Services Address Phone Fax Patient Preferred    [facility]  Skilled Nursing [address] [facility] [place] [address] [phone] --                Discharged on 6/3/2021 Admission date: 5/18/2021 - Discharge disposition: Skilled Nursing Facility (DC - External)    Destination     Service Provider Selected Services Address Phone Fax Patient Preferred    [facility]  Skilled Nursing [address] [facility] [place] [address] [phone] --          Home Medical Care     Service Provider Selected Services Address Phone Fax Patient Preferred    [facility]  Home Health Services [address] [place] [address] [phone] --                         Final Discharge Disposition Code: 03 - skilled nursing facility (SNF)

## 2021-08-04 PROBLEM — D63.1 ANEMIA OF RENAL DISEASE: Chronic | Status: ACTIVE | Noted: 2021-08-04

## 2021-08-04 PROBLEM — N18.9 ANEMIA OF RENAL DISEASE: Chronic | Status: ACTIVE | Noted: 2021-08-04

## 2021-08-04 LAB
ANION GAP SERPL CALCULATED.3IONS-SCNC: 10 MMOL/L (ref 5–15)
BASOPHILS # BLD MANUAL: 0.07 10*3/MM3 (ref 0–0.2)
BASOPHILS NFR BLD AUTO: 1 % (ref 0–1.5)
BUN SERPL-MCNC: 65 MG/DL (ref 8–23)
BUN/CREAT SERPL: 33.3 (ref 7–25)
CALCIUM SPEC-SCNC: 9 MG/DL (ref 8.6–10.5)
CHLORIDE SERPL-SCNC: 97 MMOL/L (ref 98–107)
CO2 SERPL-SCNC: 33 MMOL/L (ref 22–29)
CREAT SERPL-MCNC: 1.95 MG/DL (ref 0.76–1.27)
DEPRECATED RDW RBC AUTO: 47.7 FL (ref 37–54)
ELLIPTOCYTES BLD QL SMEAR: NORMAL
EOSINOPHIL # BLD MANUAL: 0.3 10*3/MM3 (ref 0–0.4)
EOSINOPHIL NFR BLD MANUAL: 4 % (ref 0.3–6.2)
ERYTHROCYTE [DISTWIDTH] IN BLOOD BY AUTOMATED COUNT: 15.5 % (ref 12.3–15.4)
FERRITIN SERPL-MCNC: 168.2 NG/ML (ref 30–400)
GFR SERPL CREATININE-BSD FRML MDRD: 34 ML/MIN/1.73
GLUCOSE BLDC GLUCOMTR-MCNC: 164 MG/DL (ref 70–105)
GLUCOSE BLDC GLUCOMTR-MCNC: 168 MG/DL (ref 70–105)
GLUCOSE BLDC GLUCOMTR-MCNC: 190 MG/DL (ref 70–105)
GLUCOSE SERPL-MCNC: 171 MG/DL (ref 65–99)
HCT VFR BLD AUTO: 21.4 % (ref 37.5–51)
HGB BLD-MCNC: 7.1 G/DL (ref 13–17.7)
IRON 24H UR-MRATE: 36 MCG/DL (ref 59–158)
IRON SATN MFR SERPL: 12 % (ref 20–50)
LYMPHOCYTES # BLD MANUAL: 1.78 10*3/MM3 (ref 0.7–3.1)
LYMPHOCYTES NFR BLD MANUAL: 11 % (ref 5–12)
LYMPHOCYTES NFR BLD MANUAL: 24 % (ref 19.6–45.3)
MCH RBC QN AUTO: 28.4 PG (ref 26.6–33)
MCHC RBC AUTO-ENTMCNC: 33 G/DL (ref 31.5–35.7)
MCV RBC AUTO: 86.2 FL (ref 79–97)
MONOCYTES # BLD AUTO: 0.81 10*3/MM3 (ref 0.1–0.9)
NEUTROPHILS # BLD AUTO: 4.44 10*3/MM3 (ref 1.7–7)
NEUTROPHILS NFR BLD MANUAL: 56 % (ref 42.7–76)
NEUTS BAND NFR BLD MANUAL: 4 % (ref 0–5)
PLAT MORPH BLD: NORMAL
PLATELET # BLD AUTO: 312 10*3/MM3 (ref 140–450)
PMV BLD AUTO: 8.1 FL (ref 6–12)
POTASSIUM SERPL-SCNC: 4.6 MMOL/L (ref 3.5–5.2)
QT INTERVAL: 358 MS
QT INTERVAL: 385 MS
RBC # BLD AUTO: 2.48 10*6/MM3 (ref 4.14–5.8)
SCAN SLIDE: NORMAL
SODIUM SERPL-SCNC: 140 MMOL/L (ref 136–145)
TIBC SERPL-MCNC: 299 MCG/DL (ref 298–536)
TRANSFERRIN SERPL-MCNC: 201 MG/DL (ref 200–360)
TROPONIN T SERPL-MCNC: 0.79 NG/ML (ref 0–0.03)
WBC # BLD AUTO: 7.4 10*3/MM3 (ref 3.4–10.8)
WBC MORPH BLD: NORMAL

## 2021-08-04 PROCEDURE — 94799 UNLISTED PULMONARY SVC/PX: CPT

## 2021-08-04 PROCEDURE — 85025 COMPLETE CBC W/AUTO DIFF WBC: CPT | Performed by: NURSE PRACTITIONER

## 2021-08-04 PROCEDURE — 63710000001 INSULIN ISOPHANE & REGULAR PER 5 UNITS: Performed by: INTERNAL MEDICINE

## 2021-08-04 PROCEDURE — 93005 ELECTROCARDIOGRAM TRACING: CPT | Performed by: NURSE PRACTITIONER

## 2021-08-04 PROCEDURE — 25010000002 FUROSEMIDE PER 20 MG: Performed by: INTERNAL MEDICINE

## 2021-08-04 PROCEDURE — 99214 OFFICE O/P EST MOD 30 MIN: CPT | Performed by: INTERNAL MEDICINE

## 2021-08-04 PROCEDURE — 94640 AIRWAY INHALATION TREATMENT: CPT

## 2021-08-04 PROCEDURE — 82962 GLUCOSE BLOOD TEST: CPT

## 2021-08-04 PROCEDURE — 25010000002 EPOETIN ALFA-EPBX 10000 UNIT/ML SOLUTION: Performed by: INTERNAL MEDICINE

## 2021-08-04 PROCEDURE — 80048 BASIC METABOLIC PNL TOTAL CA: CPT | Performed by: NURSE PRACTITIONER

## 2021-08-04 PROCEDURE — 85007 BL SMEAR W/DIFF WBC COUNT: CPT | Performed by: NURSE PRACTITIONER

## 2021-08-04 PROCEDURE — 25010000002 FUROSEMIDE PER 20 MG: Performed by: NURSE PRACTITIONER

## 2021-08-04 PROCEDURE — 83540 ASSAY OF IRON: CPT | Performed by: INTERNAL MEDICINE

## 2021-08-04 PROCEDURE — 99233 SBSQ HOSP IP/OBS HIGH 50: CPT | Performed by: INTERNAL MEDICINE

## 2021-08-04 PROCEDURE — 84466 ASSAY OF TRANSFERRIN: CPT | Performed by: INTERNAL MEDICINE

## 2021-08-04 PROCEDURE — 63710000001 INSULIN LISPRO (HUMAN) PER 5 UNITS: Performed by: INTERNAL MEDICINE

## 2021-08-04 PROCEDURE — 84484 ASSAY OF TROPONIN QUANT: CPT | Performed by: NURSE PRACTITIONER

## 2021-08-04 PROCEDURE — 93010 ELECTROCARDIOGRAM REPORT: CPT | Performed by: INTERNAL MEDICINE

## 2021-08-04 PROCEDURE — 82728 ASSAY OF FERRITIN: CPT | Performed by: INTERNAL MEDICINE

## 2021-08-04 RX ORDER — DILTIAZEM HYDROCHLORIDE 240 MG/1
240 CAPSULE, COATED, EXTENDED RELEASE ORAL
Status: DISCONTINUED | OUTPATIENT
Start: 2021-08-04 | End: 2021-08-25 | Stop reason: HOSPADM

## 2021-08-04 RX ORDER — INSULIN LISPRO 100 [IU]/ML
0-9 INJECTION, SOLUTION INTRAVENOUS; SUBCUTANEOUS
Status: DISCONTINUED | OUTPATIENT
Start: 2021-08-04 | End: 2021-08-14 | Stop reason: DRUGHIGH

## 2021-08-04 RX ORDER — INSULIN LISPRO 100 [IU]/ML
0-9 INJECTION, SOLUTION INTRAVENOUS; SUBCUTANEOUS AS NEEDED
Status: DISCONTINUED | OUTPATIENT
Start: 2021-08-04 | End: 2021-08-14 | Stop reason: DRUGHIGH

## 2021-08-04 RX ORDER — AMIODARONE HYDROCHLORIDE 200 MG/1
400 TABLET ORAL EVERY 12 HOURS SCHEDULED
Status: DISCONTINUED | OUTPATIENT
Start: 2021-08-04 | End: 2021-08-06

## 2021-08-04 RX ORDER — MELATONIN
1000 DAILY
Status: DISCONTINUED | OUTPATIENT
Start: 2021-08-04 | End: 2021-08-25 | Stop reason: HOSPADM

## 2021-08-04 RX ORDER — SPIRONOLACTONE 25 MG/1
25 TABLET ORAL DAILY
Status: DISCONTINUED | OUTPATIENT
Start: 2021-08-04 | End: 2021-08-10

## 2021-08-04 RX ORDER — DULOXETIN HYDROCHLORIDE 30 MG/1
60 CAPSULE, DELAYED RELEASE ORAL DAILY
Status: DISCONTINUED | OUTPATIENT
Start: 2021-08-04 | End: 2021-08-25 | Stop reason: HOSPADM

## 2021-08-04 RX ORDER — HYDRALAZINE HYDROCHLORIDE 25 MG/1
50 TABLET, FILM COATED ORAL EVERY 12 HOURS SCHEDULED
Status: DISCONTINUED | OUTPATIENT
Start: 2021-08-04 | End: 2021-08-18

## 2021-08-04 RX ORDER — ECHINACEA PURPUREA EXTRACT 125 MG
2 TABLET ORAL
Status: DISCONTINUED | OUTPATIENT
Start: 2021-08-04 | End: 2021-08-04

## 2021-08-04 RX ORDER — ISOSORBIDE MONONITRATE 60 MG/1
60 TABLET, EXTENDED RELEASE ORAL
Status: DISCONTINUED | OUTPATIENT
Start: 2021-08-04 | End: 2021-08-25 | Stop reason: HOSPADM

## 2021-08-04 RX ORDER — IPRATROPIUM BROMIDE AND ALBUTEROL SULFATE 2.5; .5 MG/3ML; MG/3ML
3 SOLUTION RESPIRATORY (INHALATION)
Status: DISCONTINUED | OUTPATIENT
Start: 2021-08-04 | End: 2021-08-15 | Stop reason: ALTCHOICE

## 2021-08-04 RX ORDER — DEXTROSE MONOHYDRATE 25 G/50ML
25 INJECTION, SOLUTION INTRAVENOUS
Status: DISCONTINUED | OUTPATIENT
Start: 2021-08-04 | End: 2021-08-25 | Stop reason: HOSPADM

## 2021-08-04 RX ORDER — GABAPENTIN 100 MG/1
200 CAPSULE ORAL EVERY 12 HOURS SCHEDULED
Status: DISCONTINUED | OUTPATIENT
Start: 2021-08-04 | End: 2021-08-11

## 2021-08-04 RX ORDER — CARVEDILOL 6.25 MG/1
6.25 TABLET ORAL 2 TIMES DAILY WITH MEALS
Status: DISCONTINUED | OUTPATIENT
Start: 2021-08-04 | End: 2021-08-18

## 2021-08-04 RX ORDER — ASPIRIN 81 MG/1
81 TABLET, CHEWABLE ORAL DAILY
Status: DISCONTINUED | OUTPATIENT
Start: 2021-08-04 | End: 2021-08-25 | Stop reason: HOSPADM

## 2021-08-04 RX ORDER — LANOLIN ALCOHOL/MO/W.PET/CERES
1000 CREAM (GRAM) TOPICAL DAILY
Status: DISCONTINUED | OUTPATIENT
Start: 2021-08-04 | End: 2021-08-25 | Stop reason: HOSPADM

## 2021-08-04 RX ORDER — DONEPEZIL HYDROCHLORIDE 5 MG/1
10 TABLET, FILM COATED ORAL NIGHTLY
Status: DISCONTINUED | OUTPATIENT
Start: 2021-08-04 | End: 2021-08-25 | Stop reason: HOSPADM

## 2021-08-04 RX ORDER — NICOTINE POLACRILEX 4 MG
15 LOZENGE BUCCAL
Status: DISCONTINUED | OUTPATIENT
Start: 2021-08-04 | End: 2021-08-25 | Stop reason: HOSPADM

## 2021-08-04 RX ORDER — FUROSEMIDE 10 MG/ML
80 INJECTION INTRAMUSCULAR; INTRAVENOUS EVERY 8 HOURS
Status: DISCONTINUED | OUTPATIENT
Start: 2021-08-04 | End: 2021-08-05

## 2021-08-04 RX ORDER — ATORVASTATIN CALCIUM 40 MG/1
40 TABLET, FILM COATED ORAL NIGHTLY
Status: DISCONTINUED | OUTPATIENT
Start: 2021-08-04 | End: 2021-08-25 | Stop reason: HOSPADM

## 2021-08-04 RX ADMIN — GABAPENTIN 200 MG: 100 CAPSULE ORAL at 20:52

## 2021-08-04 RX ADMIN — ISOSORBIDE MONONITRATE 60 MG: 60 TABLET, EXTENDED RELEASE ORAL at 17:10

## 2021-08-04 RX ADMIN — INSULIN LISPRO 2 UNITS: 100 INJECTION, SOLUTION INTRAVENOUS; SUBCUTANEOUS at 12:25

## 2021-08-04 RX ADMIN — INSULIN HUMAN 30 UNITS: 100 INJECTION, SUSPENSION SUBCUTANEOUS at 18:06

## 2021-08-04 RX ADMIN — FUROSEMIDE 80 MG: 10 INJECTION, SOLUTION INTRAMUSCULAR; INTRAVENOUS at 20:52

## 2021-08-04 RX ADMIN — CARVEDILOL 6.25 MG: 6.25 TABLET, FILM COATED ORAL at 17:10

## 2021-08-04 RX ADMIN — IPRATROPIUM BROMIDE AND ALBUTEROL SULFATE 3 ML: 2.5; .5 SOLUTION RESPIRATORY (INHALATION) at 12:22

## 2021-08-04 RX ADMIN — AMIODARONE HYDROCHLORIDE 400 MG: 200 TABLET ORAL at 20:52

## 2021-08-04 RX ADMIN — IPRATROPIUM BROMIDE AND ALBUTEROL SULFATE 3 ML: 2.5; .5 SOLUTION RESPIRATORY (INHALATION) at 08:36

## 2021-08-04 RX ADMIN — SPIRONOLACTONE 25 MG: 25 TABLET ORAL at 09:09

## 2021-08-04 RX ADMIN — Medication 10 ML: at 20:53

## 2021-08-04 RX ADMIN — Medication 1000 UNITS: at 09:09

## 2021-08-04 RX ADMIN — IPRATROPIUM BROMIDE AND ALBUTEROL SULFATE 3 ML: 2.5; .5 SOLUTION RESPIRATORY (INHALATION) at 15:33

## 2021-08-04 RX ADMIN — IPRATROPIUM BROMIDE AND ALBUTEROL SULFATE 3 ML: 2.5; .5 SOLUTION RESPIRATORY (INHALATION) at 19:44

## 2021-08-04 RX ADMIN — Medication 10 ML: at 09:10

## 2021-08-04 RX ADMIN — DULOXETINE 60 MG: 30 CAPSULE, DELAYED RELEASE ORAL at 09:09

## 2021-08-04 RX ADMIN — EPOETIN ALFA-EPBX 20000 UNITS: 10000 INJECTION, SOLUTION INTRAVENOUS; SUBCUTANEOUS at 09:16

## 2021-08-04 RX ADMIN — FUROSEMIDE 80 MG: 10 INJECTION, SOLUTION INTRAMUSCULAR; INTRAVENOUS at 14:24

## 2021-08-04 RX ADMIN — CYANOCOBALAMIN TAB 1000 MCG 1000 MCG: 1000 TAB at 09:09

## 2021-08-04 RX ADMIN — INSULIN LISPRO 2 UNITS: 100 INJECTION, SOLUTION INTRAVENOUS; SUBCUTANEOUS at 17:10

## 2021-08-04 RX ADMIN — ISOSORBIDE MONONITRATE 60 MG: 60 TABLET, EXTENDED RELEASE ORAL at 09:10

## 2021-08-04 RX ADMIN — DILTIAZEM HYDROCHLORIDE 240 MG: 240 CAPSULE, COATED, EXTENDED RELEASE ORAL at 12:26

## 2021-08-04 RX ADMIN — ASPIRIN 81 MG: 81 TABLET, CHEWABLE ORAL at 09:09

## 2021-08-04 RX ADMIN — ATORVASTATIN CALCIUM 40 MG: 40 TABLET, FILM COATED ORAL at 20:52

## 2021-08-04 RX ADMIN — GABAPENTIN 200 MG: 100 CAPSULE ORAL at 09:09

## 2021-08-04 RX ADMIN — HYDRALAZINE HYDROCHLORIDE 50 MG: 25 TABLET, FILM COATED ORAL at 09:09

## 2021-08-04 RX ADMIN — FUROSEMIDE 40 MG: 10 INJECTION, SOLUTION INTRAMUSCULAR; INTRAVENOUS at 05:19

## 2021-08-04 RX ADMIN — HYDRALAZINE HYDROCHLORIDE 50 MG: 25 TABLET, FILM COATED ORAL at 20:52

## 2021-08-04 RX ADMIN — DONEPEZIL HYDROCHLORIDE 10 MG: 5 TABLET, FILM COATED ORAL at 20:52

## 2021-08-04 NOTE — CASE MANAGEMENT/SOCIAL WORK
Discharge Planning Assessment  [facility]     Patient Name: Benson Mclean  MRN: [id number]  Today's Date: 8/4/2021    Admit Date: 8/3/2021    Discharge Needs Assessment     Row Name 08/04/21 1433       Living Environment    Lives With  facility resident [facility]    Current Living Arrangements  extended care facility    Primary Care Provided by  other (see comments) [facility]    Provides Primary Care For  no one, unable/limited ability to care for self    Family Caregiver if Needed  spouse    Able to Return to Prior Arrangements  yes       Resource/Environmental Concerns    Resource/Environmental Concerns  none    Transportation Concerns  car, none       Transition Planning    Patient/Family Anticipates Transition to  inpatient rehabilitation facility    Patient/Family Anticipated Services at Transition      Transportation Anticipated  health plan transportation       Discharge Needs Assessment    Readmission Within the Last 30 Days  previous discharge plan unsuccessful    Current Outpatient/Agency/Support Group  skilled nursing facility    Equipment Currently Used at Home  none    Concerns to be Addressed  discharge planning    Anticipated Changes Related to Illness  none    Equipment Needed After Discharge  none    Discharge Facility/Level of Care Needs  nursing facility, skilled    Provided Post Acute Provider List?  N/A    Provided Post Acute Provider Quality & Resource List?  N/A        Discharge Plan     Row Name 08/04/21 1439       Plan    Plan  anticipate return to [facility]. May need Precert. No PASRR needed. Pharm updated to reflect [facility] ([facility])    Row Name 08/04/21 1434       Plan    Plan  anticipate return to [facility].    Patient/Family in Agreement with Plan  yes    Plan Comments  CM called patients spouse. patient current at [facility] in rehab. patient has been there several months. patient does not drive. patient spouse can provide  transportation if needed. patient will most likely need EMS transport back to [facility]. patient pcp and pharm confirmed. denies issues affording food or meds. patient not independent with ADLs and wants to return to [facility]. Patients wife stated that [facility] did not have cardizem for patient and that is why patient is readmit. CM reached out to liaison to confirm that their pharm is stocked prior to patient returning. Awaiting reponse. DC barriers: elevated troponin, cardizem gtt, iv lasix, cr 1.95, renal and cards consult        Continued Care and Services - Admitted Since 8/3/2021     Destination     Service Provider Request Status Selected Services Address Phone Fax Patient Preferred    [facility]  Pending - Request Sent N/A [address] [facility] [place] [address] [phone] --            Selected Continued Care - Prior Encounters Includes selections from prior encounters from 5/5/2021 to 8/4/2021    Discharged on 8/2/2021 Admission date: 7/20/2021 - Discharge disposition: Skilled Nursing Facility (DC - External)    Destination     Service Provider Selected Services Address Phone Fax Patient Preferred    [facility]  Skilled Nursing [address] [place] [address] [phone] --                Discharged on 7/2/2021 Admission date: 6/25/2021 - Discharge disposition: Skilled Nursing Facility (DC - External)    Destination     Service Provider Selected Services Address Phone Fax Patient Preferred    [facility]  Skilled Nursing [address] [facility] [place] [address] [phone] --                Discharged on 6/3/2021 Admission date: 5/18/2021 - Discharge disposition: Skilled Nursing Facility (DC - External)    Destination     Service Provider Selected Services Address Phone Fax Patient Preferred    [facility]  Skilled Nursing [address] [place] [address] [phone]  [phone] --          Home Medical Care     Service Provider Selected Services Address Phone Fax Patient Preferred    [facility] [address] [place] [address] [phone] --                    Expected Discharge Date and Time     Expected Discharge Date Expected Discharge Time    Aug 5, 2021         Demographic Summary     Row Name 08/04/21 1432       General Information    Admission Type  observation    Arrived From  emergency department    Required Notices Provided  Observation Status Notice    Referral Source  admission list    Reason for Consult  discharge planning    Preferred Language  English     Used During This Interaction  no        Functional Status     Row Name 08/04/21 1433       Functional Status    Usual Activity Tolerance  fair    Current Activity Tolerance  fair       Functional Status, IADL    Medications  assistive person    Meal Preparation  assistive person    Housekeeping  assistive person    Laundry  assistive person    Shopping  assistive person        Patient Forms     Row Name 08/04/21 1438       Patient Forms    Important Message from Medicare (IMM)  -- OLIVERA 8/3    Patient Observation Letter  Delivered    Delivered to  Patient        Phone communication or documentation only - no physical contact with patient or family.      Charlene Garcia RN

## 2021-08-04 NOTE — CONSULTS
"Diabetes Education  Assessment/Teaching    Patient Name:  Benson Mclean  YOB: 1950  MRN: [id number]  Admit Date:  8/3/2021      Assessment Date:  8/4/2021    Most Recent Value   General Information    Referral From:  A1c [On 7/21/2021 A1c was 7.4%.]   Height  170.2 cm (67\")   Height Method  Stated   Weight  116 kg (256 lb 2.8 oz)   Weight Method  Bed scale   Pregnancy Assessment   Diabetes History   What type of diabetes do you have?  Type 2   Length of Diabetes Diagnosis  10 + years   Current DM knowledge  fair   Have you had diabetes education/teaching in the past?  yes   When and where was your diabetes education?  [facility]   Do you test your blood sugar at home?  yes   Frequency of checks  3X a day   Meter type  unsure of name but new   Who performs the test?  patient   Typical readings  cannot remember   Have you had high blood sugar? (>140mg/dl)  yes   How often do you have high blood sugar?  unknown   When was your last high blood sugar?  Admission blood sugar 198   Education Preferences   What areas of diabetes would you like to learn about?  avoiding high blood sugar, diabetes complications   Nutrition Information   Assessment Topics   Problem Solving - Assessment  Needs education   Reducing Risk - Assessment  Needs education   DM Goals   Problem Solving - Goal  Today   Reducing Risk - Goal  Today            Most Recent Value   DM Education Needs   Meter  Has own   Frequency of Testing  3 times a day   Medication  Insulin, Oral [Patient stated that he takes Novolin 70/30 30 units BID and Metformin  mg BID at home.]   Problem Solving  Hyperglycemia, Signs, Symptoms, Treatment   Reducing Risks  A1C testing [On 7/21/2021 A1c was 7.4%.]   Discharge Plan  Home   Motivation  Moderate   Teaching Method  Discussion, Handouts   Patient Response  Verbalized understanding            Other Comments:  A1c info sheet given with discussion on " A1c target and healthy blood sugar range. Patient stated he now has insulin pens at home and likes them better than the vials and syringes. Patient stated he mainly drinks diet sodas and only occasionally drinks water. Patient stated he has some trouble with his memory since having 2 strokes and a heart attack but has his wife at home to help him. Patient has no further questions or concerns related to diabetes at this time.        Electronically signed by:  Barb Callahan RN  08/04/21 15:26 EDT

## 2021-08-04 NOTE — NURSING NOTE
Telephone report given to LOKESH Sweeney on PCU; patient transferred to 2117 with O2,IV cardizem drip and with ER staff member

## 2021-08-04 NOTE — PLAN OF CARE
Goal Outcome Evaluation:  Plan of Care Reviewed With: patient        Progress: no change  Outcome Summary: controlled Afib on cardizem gtt, cardiology and nephrology to eval today

## 2021-08-04 NOTE — DISCHARGE PLACEMENT REQUEST
"Benson Tobin (70 y.o. Male)     Date of Birth Social Security Number Address Home Phone MRN    1950  [address] [facility] [place] [address] [phone] [id number]    Buddhist Marital Status          Faith        Admission Date Admission Type Admitting Provider Attending Provider Department, Room/Bed    8/3/21 Emergency Coleman Ortega MD Gill, Ravi, MD [facility], 2117/1    Discharge Date Discharge Disposition Discharge Destination                       Attending Provider: Stanton Junior MD    Allergies: Codeine    Isolation: None   Infection: None   Code Status: CPR    Ht: 170.2 cm (67\")   Wt: 116 kg (256 lb 2.8 oz)    Admission Cmt: None   Principal Problem: None                Active Insurance as of 8/3/2021     Primary Coverage     Payor Plan Insurance Group Employer/Plan Group    HUMANA MEDICARE REPLACEMENT HUMANA MEDICARE REPLACEMENT [id number]     Payor Plan Address Payor Plan Phone Number Payor Plan Fax Number Effective Dates    [address] [address] [phone]  1/1/2018 - None Entered    [facility] [place] [address]       Subscriber Name Subscriber Birth Date Member ID       BENSON TOBIN 1950 [id number]           Secondary Coverage     Payor Plan Insurance Group Employer/Plan Group    [place]      Payor Plan Address Payor Plan Phone Number Payor Plan Fax Number Effective Dates    [address]   5/1/2021 - None Entered    [place] [address]       Subscriber Name Subscriber Birth Date Member ID       BENSON TOBIN 1950 [id number]                 Emergency Contacts      (Rel.) Home Phone Work Phone Mobile Phone    SHAUNA TOBIN (Spouse) [phone] -- [phone]              "

## 2021-08-04 NOTE — CONSULTS
Referring Provider: Ivan Tapia DO  Reason for Consultation:  Shortness of breath  Palpitations  Atrial fibrillation with increased ventricular response    Patient Care Team:  Samantha Zabala APRN as PCP - General  Samantha Zabala APRN as PCP - Family Medicine  Jose G Rm MD as Consulting Physician (Cardiology)    Chief complaint  Shortness of breath and palpitations    Subjective .     History of present illness:  Benson Mclean is a 70 y.o. male who presents with multiple cardiac and noncardiac problems as outlined in the assessment was admitted after recent discharge from the hospital with history of shortness of breath and palpitations.  Patient apparently did not receive his medications appropriately at [place] and started having palpitations and shortness of breath.  No other associated aggravating or elevating factors.  Patient did not have any chest discomfort heaviness or tightness in the chest.  Denies having any fever cough chills nausea vomiting abdominal pain.  Cardiology consultation was requested.  Patient was found to have atrial fibrillation with moderate ventricular response and was started on intravenous Cardizem..     ROS      Patient is not having any chest discomfort dizziness or syncope.  Denies having any headache ,abdominal pain ,nausea, vomiting , diarrhea constipation, loss of weight or loss of appetite.  Denies having any excessive bruising ,hematuria or blood in the stool.    Review of all systems negative except as indicated      History  Past Medical History:   Diagnosis Date   • Appetite loss    • Brain bleed (CMS/HCC)    • Carpal tunnel syndrome on left     carpal tunnel on left hand   • CHF (congestive heart failure) (CMS/HCC)    • Diabetic neuropathy (CMS/HCC)    • DM2 (diabetes mellitus, type 2) (CMS/HCC)    • History of angina    • Hyperlipidemia    • Hypogonadism in male    • Obesity    • Sleep apnea    • Stroke (CMS/HCC)    • Unsteady gait        Past Surgical  History:   Procedure Laterality Date   • ANGIOPLASTY      X2   • APPENDECTOMY     • CARDIAC CATHETERIZATION  11/13/2015   • CARDIAC CATHETERIZATION N/A 5/24/2021    Procedure: Left Heart Cath and coronary angiogram;  Surgeon: Jose G Rm MD;  Location:  [facility];  Service: Cardiovascular;  Laterality: N/A;   • CARDIAC CATHETERIZATION  5/24/2021    Procedure: Saphenous Vein Graft;  Surgeon: Jose G Rm MD;  Location:  [facility];  Service: Cardiovascular;;   • CARDIAC CATHETERIZATION N/A 5/24/2021    Procedure: Percutaneous Coronary Intervention;  Surgeon: Bernabe Zambrano MD;  Location:  [facility];  Service: Cardiology;  Laterality: N/A;   • CARDIAC CATHETERIZATION N/A 5/24/2021    Procedure: Stent NIELS coronary;  Surgeon: Bernabe Zambrano MD;  Location:  [facility];  Service: Cardiology;  Laterality: N/A;   • CARDIAC CATHETERIZATION N/A 5/26/2021    Procedure: Percutaneous Coronary Intervention;  Surgeon: Bernabe Zambrano MD;  Location:  [facility];  Service: Cardiovascular;  Laterality: N/A;   • CARDIAC CATHETERIZATION N/A 5/26/2021    Procedure: Stent NIELS bypass graft;  Surgeon: Bernabe Zambrano MD;  Location:  [facility];  Service: Cardiovascular;  Laterality: N/A;   • CARDIAC ELECTROPHYSIOLOGY PROCEDURE N/A 5/24/2021    Procedure: Temporary Pacemaker;  Surgeon: Bernabe Zambrano MD;  Location:  [facility];  Service: Cardiology;  Laterality: N/A;   • CARDIAC ELECTROPHYSIOLOGY PROCEDURE N/A 5/26/2021    Procedure: Temporary Pacemaker;  Surgeon: Bernabe Zambrano MD;  Location:  [facility];  Service: Cardiovascular;  Laterality: N/A;   • CARPAL TUNNEL RELEASE Left 04/29/2018    carpal tunnel- lt hand// other hand surgeries    • CATARACT EXTRACTION, BILATERAL  2002    Dr. Lux Acosta   • CHOLECYSTECTOMY     • COLON RESECTION  2005   • CORONARY ANGIOPLASTY     • CORONARY  ANGIOPLASTY WITH STENT PLACEMENT  11/13/2015    PTCA stent to proximal in stent and mid to distal lad   • CORONARY ANGIOPLASTY WITH STENT PLACEMENT  09/16/2016    PTCA stent to mid lad and stent to vein graft to marginal   • CORONARY ARTERY BYPASS GRAFT  2005    @ [facility]   • CYST REMOVAL      cyst removed from scrotum   • FOOT SURGERY Right 07/17/2018   • FOOT SURGERY Left 02/04/2019   • TOE AMPUTATION Right     right toe removed D/T infected cut that went to the bone       Family History   Problem Relation Age of Onset   • Heart disease Mother    • Heart disease Father    • Diabetes Sister    • Heart disease Sister    • Diabetes Brother    • Mental illness Brother        Social History     Tobacco Use   • Smoking status: Former Smoker     Packs/day: 1.00     Years: 60.00     Pack years: 60.00     Types: Cigarettes   • Smokeless tobacco: Never Used   • Tobacco comment: Patient quit smoking 11/2020   Vaping Use   • Vaping Use: Never used   Substance Use Topics   • Alcohol use: No   • Drug use: Yes     Frequency: 1.0 times per week     Types: Marijuana     Comment: socially        Medications Prior to Admission   Medication Sig Dispense Refill Last Dose   • albuterol sulfate  (90 Base) MCG/ACT inhaler Inhale 2 puffs Every 4 (Four) Hours As Needed.      • apixaban (ELIQUIS) 5 MG tablet tablet Take 5 mg by mouth Daily.      • aspirin 81 MG chewable tablet Chew 81 mg Daily.      • atorvastatin (LIPITOR) 40 MG tablet Take 40 mg by mouth Every Night.      • cholecalciferol (VITAMIN D3) 25 MCG (1000 UT) tablet Take 1,000 Units by mouth Daily.      • dextrose (GLUTOSE) 40 % gel Take  by mouth Every 1 (One) Hour As Needed for Low Blood Sugar.      • donepezil (ARICEPT) 10 MG tablet Take 10 mg by mouth Every Night.      • DULoxetine HCl (DRIZALMA) 60 MG capsule Take 60 mg by mouth Daily.      • furosemide (LASIX) 40 MG tablet Take 1 tablet by mouth 2 (Two) Times a Day for 30 days. 60 tablet 0    • gabapentin (NEURONTIN)  "100 MG capsule Take 2 capsules by mouth 2 (two) times a day. 6 capsule 0    • hydrALAZINE (APRESOLINE) 50 MG tablet Take 50 mg by mouth 2 (two) times a day. Hold for SBP <110      • isosorbide mononitrate (IMDUR) 60 MG 24 hr tablet Take 60 mg by mouth 2 (Two) Times a Day.      • metoprolol succinate XL (TOPROL-XL) 100 MG 24 hr tablet Take 1 tablet by mouth Daily for 30 days. 30 tablet 0    • nitroglycerin (NITROSTAT) 0.4 MG SL tablet Place 1 tablet under the tongue Every 5 (Five) Minutes As Needed for Chest Pain (Only if SBP Greater Than 100). Take no more than 3 doses in 15 minutes. 30 tablet 12    • spironolactone (ALDACTONE) 25 MG tablet Take 1 tablet by mouth Daily for 30 days. 30 tablet 0    • tiotropium (Spiriva HandiHaler) 18 MCG per inhalation capsule Place 1 capsule into inhaler and inhale Daily. 30 capsule 1    • vitamin B-12 (CYANOCOBALAMIN) 1000 MCG tablet Take 1,000 mcg by mouth Daily.            Codeine    Scheduled Meds:dilTIAZem, 10 mg, Intravenous, Once  furosemide, 40 mg, Intravenous, Q8H  sodium chloride, 10 mL, Intravenous, Q12H      Continuous Infusions:dilTIAZem, 5-15 mg/hr, Last Rate: 5 mg/hr (08/04/21 0519)      PRN Meds:.•  acetaminophen **OR** acetaminophen **OR** acetaminophen  •  aluminum-magnesium hydroxide-simethicone  •  melatonin  •  nitroglycerin  •  ondansetron **OR** ondansetron  •  [COMPLETED] Insert peripheral IV **AND** sodium chloride  •  sodium chloride    Objective     VITAL SIGNS  Vitals:    08/04/21 0115 08/04/21 0227 08/04/21 0255 08/04/21 0355   BP: 142/64 127/58 112/62 113/58   BP Location: Left arm Left arm     Patient Position: Lying Lying     Pulse: 102 89 88 87   Resp: 18 20     Temp: 98.3 °F (36.8 °C) 97.6 °F (36.4 °C)     TempSrc: Oral Oral     SpO2: 92% 92%     Weight: 116 kg (256 lb 2.8 oz)      Height: 170.2 cm (67\")          Flowsheet Rows      First Filed Value   Admission Height  180.3 cm (71\") Documented at 08/03/2021 1733   Admission Weight  113 kg (250 lb) " Documented at 08/03/2021 1733            Intake/Output Summary (Last 24 hours) at 8/4/2021 0617  Last data filed at 8/4/2021 0132  Gross per 24 hour   Intake --   Output 900 ml   Net -900 ml        TELEMETRY: Atrial fibrillation    Physical Exam:  The patient is alert, oriented and in no distress.  Vital signs as noted above.  Head and neck revealed no carotid bruits or jugular venous distention.  No thyromegaly or lymph adenopathy is present  Lungs clear.  No wheezing.  Breath sounds are normal bilaterally.  Heart normal first and second heart sounds.No murmur.  No precordial rub is present.  No gallop is present.  Abdomen soft and nontender.  No organomegaly is present.  Extremities with good peripheral pulses without any pedal edema.  Skin warm and dry.  Musculoskeletal system is grossly normal  CNS grossly normal      Results Review:   I reviewed the patient's new clinical results.  Lab Results (last 24 hours)     Procedure Component Value Units Date/Time    Scan Slide [[id number]] Collected: 08/04/21 0519    Specimen: Blood Updated: 08/04/21 0617     Scan Slide --     Comment: See Manual Differential Results       Manual Differential [[id number]] Collected: 08/04/21 0519    Specimen: Blood Updated: 08/04/21 0617     Neutrophil % 56.0 %      Lymphocyte % 24.0 %      Monocyte % 11.0 %      Eosinophil % 4.0 %      Basophil % 1.0 %      Bands %  4.0 %      Neutrophils Absolute 4.44 10*3/mm3      Lymphocytes Absolute 1.78 10*3/mm3      Monocytes Absolute 0.81 10*3/mm3      Eosinophils Absolute 0.30 10*3/mm3      Basophils Absolute 0.07 10*3/mm3      Elliptocytes Slight/1+     WBC Morphology Normal     Platelet Morphology Normal    CBC Auto Differential [[id number]]  (Abnormal) Collected: 08/04/21 0519    Specimen: Blood Updated: 08/04/21 0617     WBC 7.40 10*3/mm3      RBC 2.48 10*6/mm3      Hemoglobin 7.1 g/dL      Hematocrit 21.4 %      MCV 86.2 fL      MCH 28.4 pg      MCHC 33.0 g/dL      RDW 15.5 %      RDW-SD 47.7  fl      MPV 8.1 fL      Platelets 312 10*3/mm3     Narrative:      The previously reported component NRBC is no longer being reported. Previous result was 0.1 /100 WBC (Reference Range: 0.0-0.2 /100 WBC) on 8/4/2021 at 0545 EDT.    Troponin [[id number]] Collected: 08/04/21 0519    Specimen: Blood Updated: 08/04/21 0537    Basic Metabolic Panel [[id number]] Collected: 08/04/21 0519    Specimen: Blood Updated: 08/04/21 0537    Troponin [[id number]]  (Abnormal) Collected: 08/03/21 2308    Specimen: Blood Updated: 08/03/21 2343     Troponin T 0.411 ng/mL     Narrative:      Troponin T Reference Range:  <= 0.03 ng/mL-   Negative for AMI  >0.03 ng/mL-     Abnormal for myocardial necrosis.  Clinicians would have to utilize clinical acumen, EKG, Troponin and serial changes to determine if it is an Acute Myocardial Infarction or myocardial injury due to an underlying chronic condition.       Results may be falsely decreased if patient taking Biotin.      COVID PRE-OP / PRE-PROCEDURE SCREENING ORDER (NO ISOLATION) - Swab, Nasopharynx [[id number]]  (Normal) Collected: 08/03/21 2135    Specimen: Swab from Nasopharynx Updated: 08/03/21 2158    Narrative:      The following orders were created for panel order COVID PRE-OP / PRE-PROCEDURE SCREENING ORDER (NO ISOLATION) - Swab, Nasopharynx.  Procedure                               Abnormality         Status                     ---------                               -----------         ------                     COVID-19,CEPHEID,COR/[facility]...[[id number]]  Normal              Final result                 Please view results for these tests on the individual orders.    COVID-19,CEPHEID,COR/[facility]/PAD/BEATRICE IN-HOUSE(OR EMERGENT/ADD-ON),NP SWAB IN TRANSPORT MEDIA 3-4 HR TAT, RT-PCR - Swab, Nasopharynx [[id number]]  (Normal) Collected: 08/03/21 2135    Specimen: Swab from Nasopharynx Updated: 08/03/21 2158     COVID19 Not Detected    Narrative:      Fact sheet for providers:  https://www.fda.gov/media/657364/download     Fact sheet for patients: https://www.fda.gov/media/572744/download  Fact sheet for providers: https://www.fda.gov/media/532334/download    Fact sheet for patients: https://www.fda.gov/media/527452/download    Test performed by PCR.    Troponin [[id number]]  (Abnormal) Collected: 08/03/21 1837    Specimen: Blood Updated: 08/03/21 1914     Troponin T 0.117 ng/mL     Narrative:      Troponin T Reference Range:  <= 0.03 ng/mL-   Negative for AMI  >0.03 ng/mL-     Abnormal for myocardial necrosis.  Clinicians would have to utilize clinical acumen, EKG, Troponin and serial changes to determine if it is an Acute Myocardial Infarction or myocardial injury due to an underlying chronic condition.       Results may be falsely decreased if patient taking Biotin.      Basic Metabolic Panel [[id number]]  (Abnormal) Collected: 08/03/21 1837    Specimen: Blood Updated: 08/03/21 1913     Glucose 198 mg/dL      BUN 68 mg/dL      Creatinine 1.91 mg/dL      Sodium 139 mmol/L      Potassium 4.5 mmol/L      Chloride 95 mmol/L      CO2 32.0 mmol/L      Calcium 9.4 mg/dL      eGFR Non African Amer 35 mL/min/1.73      BUN/Creatinine Ratio 35.6     Anion Gap 12.0 mmol/L     Narrative:      GFR Normal >60  Chronic Kidney Disease <60  Kidney Failure <15      BNP [[id number]]  (Abnormal) Collected: 08/03/21 1837    Specimen: Blood Updated: 08/03/21 1912     proBNP 7,648.0 pg/mL     Narrative:      Among patients with dyspnea, NT-proBNP is highly sensitive for the detection of acute congestive heart failure. In addition NT-proBNP of <300 pg/ml effectively rules out acute congestive heart failure with 99% negative predictive value.    Results may be falsely decreased if patient taking Biotin.      CBC & Differential [[id number]]  (Abnormal) Collected: 08/03/21 1837    Specimen: Blood Updated: 08/03/21 1853    Narrative:      The following orders were created for panel order CBC & Differential.  Procedure                                Abnormality         Status                     ---------                               -----------         ------                     CBC Auto Differential[[id number]]        Abnormal            Final result                 Please view results for these tests on the individual orders.    CBC Auto Differential [[id number]]  (Abnormal) Collected: 08/03/21 1837    Specimen: Blood Updated: 08/03/21 1853     WBC 7.40 10*3/mm3      RBC 2.82 10*6/mm3      Hemoglobin 8.0 g/dL      Hematocrit 24.5 %      MCV 86.6 fL      MCH 28.5 pg      MCHC 32.8 g/dL      RDW 15.5 %      RDW-SD 47.7 fl      MPV 8.2 fL      Platelets 334 10*3/mm3      Neutrophil % 65.9 %      Lymphocyte % 17.3 %      Monocyte % 10.2 %      Eosinophil % 5.1 %      Basophil % 1.5 %      Neutrophils, Absolute 4.90 10*3/mm3      Lymphocytes, Absolute 1.30 10*3/mm3      Monocytes, Absolute 0.70 10*3/mm3      Eosinophils, Absolute 0.40 10*3/mm3      Basophils, Absolute 0.10 10*3/mm3      nRBC 0.1 /100 WBC           Imaging Results (Last 24 Hours)     Procedure Component Value Units Date/Time    XR Chest 1 View [[id number]] Collected: 08/03/21 1859     Updated: 08/03/21 1902    Narrative:      EXAMINATION: XR CHEST 1 VW-     DATE OF EXAM: 8/3/2021 6:52 PM     INDICATION: Shortness of air, apnea, tachycardia     COMPARISON: Chest radiograph dated 07/20/2021     TECHNIQUE: Portable AP view of the chest was obtained.     FINDINGS:  There is stable cardiomegaly. Median sternotomy wires are present and  intact. There is a pulmonary interstitial edema pattern with small  bilateral pleural effusions. There are multilevel degenerative changes  of the thoracic spine.       Impression:      1. Cardiomegaly with pulmonary interstitial edema pattern and trace  bilateral pleural effusions.     Electronically Signed By-Anselmo Vaz MD On:8/3/2021 7:00 PM  This report was finalized on [id number] by  Anselmo Vaz MD.      LAB RESULTS (LAST 7  DAYS)    CBC  Results from last 7 days   Lab Units 08/04/21  0519 08/03/21 1837 07/31/21  0238   WBC 10*3/mm3 7.40 7.40 6.00   RBC 10*6/mm3 2.48* 2.82* 2.76*   HEMOGLOBIN g/dL 7.1* 8.0* 7.7*   HEMATOCRIT % 21.4* 24.5* 23.8*   MCV fL 86.2 86.6 86.3   PLATELETS 10*3/mm3 312 334 247       BMP  Results from last 7 days   Lab Units 08/03/21 1837 08/02/21 0244 08/01/21 0305 07/31/21 0238 07/30/21 0225 07/29/21  0322   SODIUM mmol/L 139 137 141 139 138 138   POTASSIUM mmol/L 4.5 4.6 4.7 4.6 4.4 4.1   CHLORIDE mmol/L 95* 97* 100 100 97* 98   CO2 mmol/L 32.0* 34.0* 33.0* 31.0* 31.0* 32.0*   BUN mg/dL 68* 64* 55* 51* 52* 50*   CREATININE mg/dL 1.91* 1.95* 1.99* 1.86* 1.98* 2.07*   GLUCOSE mg/dL 198* 92 64* 99 93 89   MAGNESIUM mg/dL  --   --   --   --  2.2  --        CMP   Results from last 7 days   Lab Units 08/03/21 1837 08/02/21 0244 08/01/21 0305 07/31/21 0238 07/30/21 0225 07/29/21  0322   SODIUM mmol/L 139 137 141 139 138 138   POTASSIUM mmol/L 4.5 4.6 4.7 4.6 4.4 4.1   CHLORIDE mmol/L 95* 97* 100 100 97* 98   CO2 mmol/L 32.0* 34.0* 33.0* 31.0* 31.0* 32.0*   BUN mg/dL 68* 64* 55* 51* 52* 50*   CREATININE mg/dL 1.91* 1.95* 1.99* 1.86* 1.98* 2.07*   GLUCOSE mg/dL 198* 92 64* 99 93 89         BNP        TROPONIN  Results from last 7 days   Lab Units 08/03/21  2308   TROPONIN T ng/mL 0.411*       CoAg        Creatinine Clearance  Estimated Creatinine Clearance: 43.8 mL/min (A) (by C-G formula based on SCr of 1.91 mg/dL (H)).    ABG        Radiology  XR Chest 1 View    Result Date: 8/3/2021  1. Cardiomegaly with pulmonary interstitial edema pattern and trace bilateral pleural effusions.  Electronically Signed By-Anselmo Vaz MD On:8/3/2021 7:00 PM This report was finalized on [id number] by  Anselmo Vaz MD.              EKG              I personally viewed and interpreted the patient's EKG/Telemetry data:    ECHOCARDIOGRAM:    Results for orders placed during the hospital encounter of 07/20/21    Adult  Transthoracic Echo Complete With Contrast if Necessary Per Protocol    Interpretation Summary  · Estimated left ventricular EF = 30% Left ventricular systolic function is moderately decreased.    Occasions  Shortness of breath.    Technically satisfactory study.  Mitral valve is structurally normal.  Mild mitral regurgitation.  Tricuspid valve is structurally normal.  Aortic valve is structurally normal.  Pulmonic valve could not be well visualized.  No evidence for tricuspid or aortic regurgitation is seen by Doppler study.  Biatrial and biventricular enlargement is present.  Diffuse left ventricular hypocontractility with ejection fraction of 30%.  Atrial septum is intact.  Aorta is normal.  No pericardial effusion or intracardiac thrombus is seen.    Impression  Mild mitral regurgitation.  Significant biatrial and biventricular enlargement.  Diffuse left ventricular hypocontractility with ejection fraction of 30%.  No pericardial effusion or intracardiac thrombus is seen.              Cardiolite (Tc-99m Sestamibi) stress test      OTHER:     Assessment/Plan     Principal Problem:    Atrial fibrillation with RVR (CMS/HCC)  Active Problems:    S/P CABG (coronary artery bypass graft)    Essential hypertension    Elevated troponin    ANNETTE treated with BiPAP    Major depressive disorder, recurrent, mild (CMS/HCC)    Immobility syndrome    Mixed hyperlipidemia    History of amputation of lesser toe (CMS/HCC)    Flaccid hemiplegia of right dominant side as late effect of cerebral infarction (CMS/HCC)    Diabetic neuropathy (CMS/HCC)    Unspecified dementia with behavioral disturbance (CMS/HCC)    Coronary artery disease    Chronic venous hypertension (idiopathic) with ulcer and inflammation of bilateral lower extremity (CMS/HCC)    Chronic obstructive pulmonary disease, unspecified (CMS/HCC)    Chronic foot ulcer (CMS/HCC)    Paroxysmal atrial fibrillation (CMS/HCC)    CKD (chronic kidney disease) stage 3, GFR 30-59  ml/min (CMS/[facility])    Tobacco use disorder    Acute on chronic systolic heart failure (CMS/[facility])    A-fib (CMS/[facility])    [[[[[[[[[[[[[[[[[[[[[[[[[    Impression  ==============  -Increased shortness of breath and palpitations     -Recent acute on chronic congestive heart failure.  Compensated at this time  Recent echocardiogram showed left ventricle dysfunction with ejection fraction of 35%.     -History of right-sided weakness with left MCA territory stroke.-Improved      -status post CABG 2005. status post stent to LAD 2009    Status post stent to LAD 11/13/2015  Status post stent to mid LAD and SVG to marginal branch 09/16/2016.  Status post stent to mid LAD 5/24/2021  Status post stent to SVG to RCA 5/26/2021     Cardiac catheterization 5/24/2021 revealed  Left ventricle angiogram was not performed due to pre-existing renal dysfunction.  Left main coronary artery normal.  Left anterior descending artery has mid segment lengthy 90% disease within the previously placed stent.  Distal left into descending artery has diffuse disease.  Circumflex coronary artery is totally occluded.  (Chronic)  Right coronary artery is chronically occluded.  SVG to marginal branch (jump graft to OM1 and OM 2) is totally occluded (new finding compared to 2015.  SVG to PDA has distal radiolucency.  However no definite obstructive disease is present.       -status post myocardial infarction 2000 and 2002 .      -history of intermittent atrial fibrillation-maintaining sinus rhythm     Transesophageal echocardiogram 11/30/2020.  Biatrial enlargement.  Smoking effect in the left atrium and left ventricle and left atrial appendage.  Mild mitral and aortic regurgitation.  Left ventricle enlargement with diffuse hypocontractility with ejection fraction of 35 to 40%.     Echocardiogram 11/27/2020 revealed left atrial enlargement left ventricle dysfunction with ejection fraction of 40%.  Negative bubble study.      -diabetes hypertension and sleep  apnea in history of renal dysfunction .  Recent BUN/creatinine 38/1.36     -status post colon surgery (partial colectomy) appendectomy cholecystectomy right 4th toe removal and carpal tunnel surgery      -continued smoking 1 pack per day -abstinence from smoking      -allergy to codeine.  ============   Plan  ================  Patient recently was discharged from the hospital.  Patient apparently was not receiving his medications appropriately.  Patient has atrial fibrillation with rapid ventricle response.  Patient is on intravenous Cardizem with better rate control.  Adjust rate controlling medications and balance with blood pressure status.    Troponin level is slightly elevated  0.11  0.41    Significant renal dysfunction-chronic  BUN/68/1.91-8/4/2021.  BUN 64 creatinine 1.95-8/2/2021    Anemia  hgb 7.1    Status post CABG  Patient is not having any angina pectoris  Status post stent to LAD 5/24/2021.  Status post stent to SVG to RCA 5/26/2021.    History of Spontaneous prolonged episode of asystole is of concern.  Patient had recovered without any intervention or CPR.  We will closely observe for any bradycardia arrhythmias and patient may need pacemaker/ICD implantation.  No further episodes.     Paroxysmal atrial fibrillation.  Patient is mostly in sinus rhythm.      Anticoagulation  Patient is on Eliquis.     Medications were reviewed and updated.  Patient is on intravenous Lasix and spironolactone p.o.  We will start Coreg.  Eliquis can be restarted.  Cardizem CD     Further plan will depend on patient's progress.   ]]]]]]]]]]]]]]]]]]]]]]      Jose G Rm MD  08/04/21  06:17 EDT

## 2021-08-04 NOTE — PROGRESS NOTES
[facility] Medicine Services Daily Progress Note    Patient Name: Benson Mclean  : 1950  MRN: [id number]  Primary Care Physician:  Samantha Zabala APRN  Date of admission: 8/3/2021      Subjective      Chief Complaint: Palpitations      Patient Reports Benson Mclean is a 70 y.o. male w/PMH of CHF, CKD, DM, HTN, HLD, CAD, COPD, A. fib, CABG, ANNETTE, CVA, neuropathy, obesity, depression, COVID-19 who presents to [facility] ED with palpitations, patient was discharged from this facility yesterday after a 12-day admission.  Patient states tachycardia progressively worsened after discharge from this facility yesterday, no aggravating or alleviating factors noted.  Patient admits to chest pain, nausea, weakness, palpitations, edema, dyspnea.  Patient denies fever, chills, nausea, vomiting.  As tachycardia did not improve he decided to go to [facility] ED.        Upon arrival to the ED vitals temp 97, HR 93, RR 22, /75, O2 sat 97% on 4 L nasal cannula.  Labs notable for troponin 0.117, proBNP 7648, glucose 198, , K4.5, CO2 32, creatinine 1.91, BUN 68, GFR 35, WBC 7.4, Hgb 8.0, platelets 334, neutrophils 65.9.  EKG shows tachycardia rate 126 repolarization abnormality suggest ischemia diffuse leads.  Chest x-ray shows stable cardiomegaly with pulmonary interstitial edema pattern trace bilateral pleural effusions.  Patient treated in the ED with 10 mg IV Cardizem, IV Cardizem drip initiated.    Readmitted from [facility] after having been discharged 24 hours ago. 21, 3:42 PM EDT        ROS Constitutional: Negative. Negative for chills and fever.   HENT: Negative.    Eyes: Negative.    Cardiovascular: Positive for chest pain, dyspnea on exertion, irregular heartbeat, leg swelling and palpitations.   Respiratory: Positive for shortness of breath. Negative for cough.    Endocrine: Negative.    Hematologic/Lymphatic: Negative.    Skin:  Negative.    Musculoskeletal: Positive for arthritis.   Gastrointestinal: Negative.  Negative for nausea and vomiting.   Genitourinary: Negative.    Neurological: Negative.    Psychiatric/Behavioral: Negative.    Allergic/Immunologic: Negative.    All other systems reviewed and are negative.   Noted        Objective      Vitals:   Temp:  [97 °F (36.1 °C)-98.3 °F (36.8 °C)] 98 °F (36.7 °C)  Heart Rate:  [] 82  Resp:  [18-22] 18  BP: (110-148)/(45-89) 128/45  Flow (L/min):  [2-4] 2    Physical Exam  Vitals and nursing note reviewed.   Constitutional:       General: He is not in acute distress.     Appearance: Normal appearance. He is well-developed. He is not ill-appearing, toxic-appearing or diaphoretic.   HENT:      Head: Normocephalic and atraumatic.      Right Ear: Ear canal and external ear normal.      Left Ear: Ear canal and external ear normal.      Nose: Nose normal. No congestion or rhinorrhea.      Mouth/Throat:      Mouth: Mucous membranes are moist.      Pharynx: No oropharyngeal exudate.   Eyes:      General: No scleral icterus.        Right eye: No discharge.         Left eye: No discharge.      Extraocular Movements: Extraocular movements intact.      Conjunctiva/sclera: Conjunctivae normal.      Pupils: Pupils are equal, round, and reactive to light.   Neck:      Thyroid: No thyromegaly.      Vascular: No carotid bruit or JVD.      Trachea: No tracheal deviation.   Cardiovascular:      Rate and Rhythm: Normal rate. Rhythm irregular.      Heart sounds: Normal heart sounds. No murmur heard.   No friction rub. No gallop.       Comments: Absent pedal pulses  Pulmonary:      Effort: Pulmonary effort is normal. No respiratory distress.      Breath sounds: Normal breath sounds. No stridor. No wheezing, rhonchi or rales.   Chest:      Chest wall: No tenderness.   Abdominal:      General: Bowel sounds are normal. There is no distension.      Palpations: Abdomen is soft. There is no mass.      Tenderness:  There is no abdominal tenderness. There is no guarding or rebound.      Hernia: No hernia is present.   Musculoskeletal:         General: No swelling, tenderness, deformity or signs of injury. Normal range of motion.      Cervical back: Normal range of motion and neck supple. No rigidity. No muscular tenderness.      Right lower leg: No edema.      Left lower leg: No edema.   Lymphadenopathy:      Cervical: No cervical adenopathy.   Skin:     General: Skin is warm and dry.      Coloration: Skin is not jaundiced or pale.      Findings: No bruising, erythema or rash.   Neurological:      General: No focal deficit present.      Mental Status: He is alert and oriented to person, place, and time. Mental status is at baseline.      Cranial Nerves: No cranial nerve deficit.      Sensory: No sensory deficit.      Motor: No weakness or abnormal muscle tone.      Coordination: Coordination normal.   Psychiatric:         Mood and Affect: Mood normal.         Behavior: Behavior normal.         Thought Content: Thought content normal.         Judgment: Judgment normal.             Result Review    Result Review:  I have personally reviewed the results from the time of this admission to 8/4/2021 15:47 EDT and agree with these findings:  [x]  Laboratory  []  Microbiology  [x]  Radiology  []  EKG/Telemetry   []  Cardiology/Vascular   []  Pathology  [x]  Old records  []  Other:   Most notable findings include: Reviewed patient's discharge summary from the last visit 24 hours ago.  That time he was here for the dobutamine drip and systolic CHF.  Labs revealed elevated creatinine of 1.9 normocytic anemia with hemoglobin of 7.1.  Elevated troponin of 0.4.  Decreased iron levels.  BNP of more than 7000          Assessment/Plan      Brief Patient Summary:  Benson Mclean is a 70 y.o. male who       aspirin, 81 mg, Oral, Daily  atorvastatin, 40 mg, Oral, Nightly  carvedilol, 6.25 mg, Oral, BID With Meals  cholecalciferol, 1,000 Units,  Oral, Daily  dilTIAZem, 10 mg, Intravenous, Once  dilTIAZem CD, 240 mg, Oral, Q24H  donepezil, 10 mg, Oral, Nightly  DULoxetine, 60 mg, Oral, Daily  furosemide, 80 mg, Intravenous, Q8H  gabapentin, 200 mg, Oral, Q12H  hydrALAZINE, 50 mg, Oral, Q12H  insulin lispro, 0-9 Units, Subcutaneous, TID AC  ipratropium-albuterol, 3 mL, Nebulization, 4x Daily - RT  isosorbide mononitrate, 60 mg, Oral, BID - Nitrates  sodium chloride, 10 mL, Intravenous, Q12H  spironolactone, 25 mg, Oral, Daily  vitamin B-12, 1,000 mcg, Oral, Daily             Active Hospital Problems:  Active Hospital Problems    Diagnosis    • **Atrial fibrillation with RVR (CMS/[facility])    • Acute on chronic systolic heart failure (CMS/[facility])    • Anemia of renal disease    • Tobacco use disorder    • CKD (chronic kidney disease) stage 3, GFR 30-59 ml/min (CMS/[facility])    • Mixed hyperlipidemia    • Chronic obstructive pulmonary disease, unspecified (CMS/[facility])    • Flaccid hemiplegia of right dominant side as late effect of cerebral infarction (CMS/[facility])    • Unspecified dementia with behavioral disturbance (CMS/[facility])    • Elevated troponin    • Essential hypertension    • S/P CABG (coronary artery bypass graft)    • Chronic venous hypertension (idiopathic) with ulcer and inflammation of bilateral lower extremity (CMS/[facility])    • Chronic foot ulcer (CMS/[facility])    • Coronary artery disease    • ANNETTE treated with BiPAP    • Diabetic neuropathy (CMS/[facility])      Plan:     Atrial fibrillation:   Rate control  Beta-blockers  Calcium channel blockers  Digoxin  +/-Amiodarone  Anticoagulation per CHADS VASC2 SCORE  Close monitoring      Smoking:  Counseled to quit smoking  Long-term compliance is suspect  Nicotine replacement while in the hospital      Renal failure/insufficiency/injury:  Hydration  Supportive treatment  Cut down or hold ACE inhibitors  Avoid nephrotoxic medications   Address diuretics        Hyperlipidemia:  Check lipid panel  Statins if indicated  Add Ezetimibe if  appropriate  Only Icosapent Ethyl (omega-3) demonstrated efficacy in Hypertriglyceridemia. (STRENGTH, REDUCE IT trials)    His prognosis remains poor due to above-mentioned comorbidities.  And he remains at high risk for readmission whenever he is discharged.  I would suggest torsemide on discharge instead of furosemide due to poor bioavailability of the latter.      DVT prophylaxis:  No DVT prophylaxis order currently exists.    CODE STATUS:    Code Status: CPR  Medical Interventions (Level of Support Prior to Arrest): Full      Disposition:  I expect patient to be discharged after stabilization of his CHF and heart rate.    This patient has been examined wearing appropriate Personal Protective [id number]/04/21      Electronically signed by Stanton Junior MD, 08/04/21, 15:47 EDT.  [facility] Hospitalist Team

## 2021-08-04 NOTE — PROGRESS NOTES
"Heart Failure Program  Nurse Navigator  Discharge Planning    Patient Name:Benson Mclean  :1950  Cardiologist:Sujey  Current Admission Date: 8/3/2021   Previous Admission: 21  Admission frequency: 5 admissions in 6 months    Heart Failure history per record:    Symptoms on admission:c/o palpitations, pt from rehab. Last admission 21. Pt advises he was only at rehab less than a day, \"they did not have my right medication so my heart started beating faster again.\" Unable to verify information with report and RN.        Admission Weight:  Flowsheet Rows      First Filed Value   Admission Height  180.3 cm (71\") Documented at 2021 1733   Admission Weight  113 kg (250 lb) Documented at 2021 1733            Current Home Medications:  Prior to Admission medications    Medication Sig Start Date End Date Taking? Authorizing Provider   albuterol sulfate  (90 Base) MCG/ACT inhaler Inhale 2 puffs Every 4 (Four) Hours As Needed. 21   Timo Austin MD   apixaban (ELIQUIS) 5 MG tablet tablet Take 5 mg by mouth Daily.    Timo Austin MD   aspirin 81 MG chewable tablet Chew 81 mg Daily.    Timo Austin MD   atorvastatin (LIPITOR) 40 MG tablet Take 40 mg by mouth Every Night.    Timo Austin MD   cholecalciferol (VITAMIN D3) 25 MCG (1000 UT) tablet Take 1,000 Units by mouth Daily.    Timo Austin MD   dextrose (GLUTOSE) 40 % gel Take  by mouth Every 1 (One) Hour As Needed for Low Blood Sugar.    Timo Austin MD   donepezil (ARICEPT) 10 MG tablet Take 10 mg by mouth Every Night.    Timo Austin MD   DULoxetine HCl (DRIZALMA) 60 MG capsule Take 60 mg by mouth Daily. 20   Timo Austin MD   furosemide (LASIX) 40 MG tablet Take 1 tablet by mouth 2 (Two) Times a Day for 30 days. 21  Marquez Mcfarlane MD   gabapentin (NEURONTIN) 100 MG capsule Take 2 capsules by mouth 2 (two) times a day. 21   Denys, " Marquez BHAKTA MD   hydrALAZINE (APRESOLINE) 50 MG tablet Take 50 mg by mouth 2 (two) times a day. Hold for SBP <110    Timo Austin MD   isosorbide mononitrate (IMDUR) 60 MG 24 hr tablet Take 60 mg by mouth 2 (Two) Times a Day.    Timo Austin MD   metoprolol succinate XL (TOPROL-XL) 100 MG 24 hr tablet Take 1 tablet by mouth Daily for 30 days. 8/3/21 9/2/21  Stanton Junior MD   nitroglycerin (NITROSTAT) 0.4 MG SL tablet Place 1 tablet under the tongue Every 5 (Five) Minutes As Needed for Chest Pain (Only if SBP Greater Than 100). Take no more than 3 doses in 15 minutes. 7/2/21   Marquez Mcfarlane MD   spironolactone (ALDACTONE) 25 MG tablet Take 1 tablet by mouth Daily for 30 days. 8/3/21 9/2/21  Stanton Junior MD   tiotropium (Spiriva HandiHaler) 18 MCG per inhalation capsule Place 1 capsule into inhaler and inhale Daily. 6/22/20   Grayson Verduzco MD   vitamin B-12 (CYANOCOBALAMIN) 1000 MCG tablet Take 1,000 mcg by mouth Daily.    ProviderTimo MD       Social history:   Pt dc to rehab facility on 8/2/21. On dc pt was on metopropolol XL.     Smoking status:     Diagnostics Testing:  proBNP level: 7648    Echocardiogram:Results for orders placed during the hospital encounter of 07/20/21    Adult Transthoracic Echo Complete With Contrast if Necessary Per Protocol    Interpretation Summary  · Estimated left ventricular EF = 30% Left ventricular systolic function is moderately decreased.    Occasions  Shortness of breath.    Technically satisfactory study.  Mitral valve is structurally normal.  Mild mitral regurgitation.  Tricuspid valve is structurally normal.  Aortic valve is structurally normal.  Pulmonic valve could not be well visualized.  No evidence for tricuspid or aortic regurgitation is seen by Doppler study.  Biatrial and biventricular enlargement is present.  Diffuse left ventricular hypocontractility with ejection fraction of 30%.  Atrial septum is intact.  Aorta is normal.  No  pericardial effusion or intracardiac thrombus is seen.    Impression  Mild mitral regurgitation.  Significant biatrial and biventricular enlargement.  Diffuse left ventricular hypocontractility with ejection fraction of 30%.  No pericardial effusion or intracardiac thrombus is seen.        Patient Assessment:   Pt lying in bed, resp even and unlabored, nos SOA with conversation, no edema.     Current O2: 2L NC  Home O2: 2L NC    Education provided to patient:  yes- Heart Failure disease education  yes -Symptom identification/management  pending -Daily Weights  pending- Diet education  na- Fluid restriction (if ordered)  yes- Activity education  yes- Medication education  na- Smoking cessation  pending- Follow-up Appointments  pending-Provided information on how to access [facility] My HF Guide/Heart Failure Interactive workbook    Acceptance of learning: acceptance    Heart Failure education interactive teaching session time: 30 minutes    Identified needs/barriers:   Medication clarification needed - discuss with RN, I&O, daily weights    Intervention:   RN

## 2021-08-04 NOTE — CASE MANAGEMENT/SOCIAL WORK
Case Management Readmission Assessment Note       Case Management Readmission Assessment (all recorded)      Readmission Interview     Row Name 08/04/21 1442             Readmission Indications    Is this hospitalization related to the prior hospital diagnosis?  Yes      What was the reason you were admitted?  patient DC from [facility] monday evening to [facility] for Inpt rehab. Patient wife states that [facility] did not have patients cardizem in stock and patient did not receive cardizem. patients heart rate became elevated and then lead to radiating chest pain. MD at [facility] then decided to send patient back to [facility].      Row Name 08/04/21 1442             Recommendation for rehospitalization    Did you speak with your physician prior to coming to the hospital  Yes      Who recommended you return to the hospital?  Other (comment) MD at [facility]      Did you seek care elsewhere prior to coming to the hospital?  Yes was at [facility] inpt rehab      Row Name 08/04/21 1442             Follow up appointment    Do you have a PCP?  Yes      Did you have an appointment with PCP/specialist after hospitalization within 7 days?  No      Row Name 08/04/21 1442             Medications    Did you have newly prescribed medications at discharge?  Yes      Did you understand the reasons for your medications at discharge and how to take them?  Yes      Did you understand the side effects of your medications?  Yes      Are you taking all of you prescribed medications?  No      If not, why?  Drug unavailable      Row Name 08/04/21 1442             Discharge Instructions    Did you understand your discharge instructions?  Yes      Did your family/caregiver hear your instructions?  Yes      Were you told to eat a special diet?  Yes      Did you adhere to the diet?  Yes      Were you given a number of someone to call if you had questions or concerns?  Yes      Row Name 08/04/21 1442             Index discharge location/services     Where did you go upon discharge?  Skilled Nursing Facility      Do you have supportive family or friends in the home?  Yes      What services were arranged at discharge?  Transportation      Row Name 08/04/21 1837             Discharge Readiness    On a scale of 1-5 (5 being well prepared), how ready were you for discharge  3      Recommendation based on interview  Education on diagnosis/self management         Phone communication or documentation only - no physical contact with patient or family.

## 2021-08-04 NOTE — PROGRESS NOTES
"RENAL/[facility]:     LOS: 0 days    Patient Care Team:  Samantha Zabala APRN as PCP - General  Samantha Zabala APRN as PCP - Family Medicine  Jose G Rm MD as Consulting Physician (Cardiology)    Chief Complaint:  Palpitations    Subjective     Interval History:   Patient D/C'd and readmitted within 24 hours.  Presented to ER with palpitations and tachycardia.  Cr stable at 1.9 from D/C.  On IV Lasix.  CXR with pulm edema.    Objective     Vital Sign Min/Max for last 24 hours  Temp  Min: 97 °F (36.1 °C)  Max: 98.3 °F (36.8 °C)   BP  Min: 112/62  Max: 148/81   Pulse  Min: 81  Max: 123   Resp  Min: 18  Max: 22   SpO2  Min: 92 %  Max: 100 %   Flow (L/min)  Min: 2  Max: 4   Weight  Min: 113 kg (250 lb)  Max: 116 kg (256 lb 2.8 oz)     Flowsheet Rows      First Filed Value   Admission Height  180.3 cm (71\") Documented at 08/03/2021 1733   Admission Weight  113 kg (250 lb) Documented at 08/03/2021 1733          I/O this shift:  In: -   Out: 300 [Urine:300]  I/O last 3 completed shifts:  In: -   Out: 900 [Urine:900]    Physical Exam:  GEN: Awake, NAD  ENT: PERRL, EOMI, MMM  NECK: Supple, no JVD  CHEST: CTAB, no W/R/C  CV: RRR, no M/G/R  ABD: Soft, NT, +BS  SKIN: Warm and Dry  NEURO: CN's intact      WBC WBC   Date Value Ref Range Status   08/04/2021 7.40 3.40 - 10.80 10*3/mm3 Final   08/03/2021 7.40 3.40 - 10.80 10*3/mm3 Final      HGB Hemoglobin   Date Value Ref Range Status   08/04/2021 7.1 (L) 13.0 - 17.7 g/dL Final   08/03/2021 8.0 (L) 13.0 - 17.7 g/dL Final      HCT Hematocrit   Date Value Ref Range Status   08/04/2021 21.4 (L) 37.5 - 51.0 % Final   08/03/2021 24.5 (L) 37.5 - 51.0 % Final      Platlets No results found for: LABPLAT   MCV MCV   Date Value Ref Range Status   08/04/2021 86.2 79.0 - 97.0 fL Final   08/03/2021 86.6 79.0 - 97.0 fL Final          Sodium Sodium   Date Value Ref Range Status   08/04/2021 140 136 - 145 mmol/L Final   08/03/2021 139 136 - 145 mmol/L Final   08/02/2021 137 136 - 145 mmol/L Final "      Potassium Potassium   Date Value Ref Range Status   08/04/2021 4.6 3.5 - 5.2 mmol/L Final   08/03/2021 4.5 3.5 - 5.2 mmol/L Final   08/02/2021 4.6 3.5 - 5.2 mmol/L Final      Chloride Chloride   Date Value Ref Range Status   08/04/2021 97 (L) 98 - 107 mmol/L Final   08/03/2021 95 (L) 98 - 107 mmol/L Final   08/02/2021 97 (L) 98 - 107 mmol/L Final      CO2 CO2   Date Value Ref Range Status   08/04/2021 33.0 (H) 22.0 - 29.0 mmol/L Final   08/03/2021 32.0 (H) 22.0 - 29.0 mmol/L Final   08/02/2021 34.0 (H) 22.0 - 29.0 mmol/L Final      BUN BUN   Date Value Ref Range Status   08/04/2021 65 (H) 8 - 23 mg/dL Final   08/03/2021 68 (H) 8 - 23 mg/dL Final   08/02/2021 64 (H) 8 - 23 mg/dL Final      Creatinine Creatinine   Date Value Ref Range Status   08/04/2021 1.95 (H) 0.76 - 1.27 mg/dL Final   08/03/2021 1.91 (H) 0.76 - 1.27 mg/dL Final   08/02/2021 1.95 (H) 0.76 - 1.27 mg/dL Final      Calcium Calcium   Date Value Ref Range Status   08/04/2021 9.0 8.6 - 10.5 mg/dL Final   08/03/2021 9.4 8.6 - 10.5 mg/dL Final   08/02/2021 9.1 8.6 - 10.5 mg/dL Final      PO4 No results found for: CAPO4   Albumin No results found for: ALBUMIN   Magnesium No results found for: MG   Uric Acid No results found for: URICACID        Results Review:     I reviewed the patient's new clinical results.    aspirin, 81 mg, Oral, Daily  atorvastatin, 40 mg, Oral, Nightly  cholecalciferol, 1,000 Units, Oral, Daily  dilTIAZem, 10 mg, Intravenous, Once  donepezil, 10 mg, Oral, Nightly  DULoxetine, 60 mg, Oral, Daily  furosemide, 80 mg, Intravenous, Q8H  gabapentin, 200 mg, Oral, Q12H  hydrALAZINE, 50 mg, Oral, Q12H  insulin lispro, 0-9 Units, Subcutaneous, TID AC  ipratropium-albuterol, 3 mL, Nebulization, 4x Daily - RT  isosorbide mononitrate, 60 mg, Oral, BID - Nitrates  sodium chloride, 10 mL, Intravenous, Q12H  spironolactone, 25 mg, Oral, Daily  vitamin B-12, 1,000 mcg, Oral, Daily      dilTIAZem, 5-15 mg/hr, Last Rate: 5 mg/hr (08/04/21  0519)        Medication Review: Reviewed    Assessment/Plan       S/P CABG (coronary artery bypass graft)    Essential hypertension    Elevated troponin    ANNETTE treated with BiPAP    Major depressive disorder, recurrent, mild (CMS/HCC)    Immobility syndrome    Mixed hyperlipidemia    History of amputation of lesser toe (CMS/[facility])    Flaccid hemiplegia of right dominant side as late effect of cerebral infarction (CMS/[facility])    Diabetic neuropathy (CMS/[facility])    Unspecified dementia with behavioral disturbance (CMS/[facility])    Coronary artery disease    Chronic venous hypertension (idiopathic) with ulcer and inflammation of bilateral lower extremity (CMS/[facility])    Chronic obstructive pulmonary disease, unspecified (CMS/[facility])    Chronic foot ulcer (CMS/[facility])    Paroxysmal atrial fibrillation (CMS/[facility])    CKD (chronic kidney disease) stage 3, GFR 30-59 ml/min (CMS/[facility])    Tobacco use disorder    Acute on chronic systolic heart failure (CMS/[facility])      1. DILEEP  2. CKD III  3. Pulmonary edema  4. Tachycardia  5. CHF  6. T2DM  7. Hypertension     PLAN:  Cr stable at 1.9 = likely new baseline.  Lytes OK.  Titrate IV Lasix.  Continue Aldactone.  Rate control per Cardiology.  Will follow.       Yony Bhat M.D.  [facility]

## 2021-08-04 NOTE — H&P
[facility] Medicine Services      Patient Name: Benson Mclean  : 1950  MRN: [id number]  Primary Care Physician:  Samantha Zabala APRN  Date of admission: 8/3/2021      Subjective      Chief Complaint: Tachycardia    History of Present Illness:   Benson Mclean is a 70 y.o. male w/PMH of CHF, CKD, DM, HTN, HLD, CAD, COPD, A. fib, CABG, ANNETTE, CVA, neuropathy, obesity, depression, COVID-19 who presents to [facility] ED with palpitations, patient was discharged from this facility yesterday after a 12-day admission.  Patient states tachycardia progressively worsened after discharge from this facility yesterday, no aggravating or alleviating factors noted.  Patient admits to chest pain, nausea, weakness, palpitations, edema, dyspnea.  Patient denies fever, chills, nausea, vomiting.  As tachycardia did not improve he decided to go to [facility] ED.      Upon arrival to the ED vitals temp 97, HR 93, RR 22, /75, O2 sat 97% on 4 L nasal cannula.  Labs notable for troponin 0.117, proBNP 7648, glucose 198, , K4.5, CO2 32, creatinine 1.91, BUN 68, GFR 35, WBC 7.4, Hgb 8.0, platelets 334, neutrophils 65.9.  EKG shows tachycardia rate 126 repolarization abnormality suggest ischemia diffuse leads.  Chest x-ray shows stable cardiomegaly with pulmonary interstitial edema pattern trace bilateral pleural effusions.  Patient treated in the ED with 10 mg IV Cardizem, IV Cardizem drip initiated.      Review of Systems   Constitutional: Negative. Negative for chills and fever.   HENT: Negative.    Eyes: Negative.    Cardiovascular: Positive for chest pain, dyspnea on exertion, irregular heartbeat, leg swelling and palpitations.   Respiratory: Positive for shortness of breath. Negative for cough.    Endocrine: Negative.    Hematologic/Lymphatic: Negative.    Skin: Negative.    Musculoskeletal: Positive for arthritis.   Gastrointestinal: Negative.  Negative for nausea and  vomiting.   Genitourinary: Negative.    Neurological: Negative.    Psychiatric/Behavioral: Negative.    Allergic/Immunologic: Negative.    All other systems reviewed and are negative.       Personal History     Past Medical History:   Diagnosis Date   • Appetite loss    • Brain bleed (CMS/HCC)    • Carpal tunnel syndrome on left     carpal tunnel on left hand   • CHF (congestive heart failure) (CMS/HCC)    • Diabetic neuropathy (CMS/HCC)    • DM2 (diabetes mellitus, type 2) (CMS/[facility])    • History of angina    • Hyperlipidemia    • Hypogonadism in male    • Obesity    • Sleep apnea    • Stroke (CMS/[facility])    • Unsteady gait        Past Surgical History:   Procedure Laterality Date   • ANGIOPLASTY      X2   • APPENDECTOMY     • CARDIAC CATHETERIZATION  11/13/2015   • CARDIAC CATHETERIZATION N/A 5/24/2021    Procedure: Left Heart Cath and coronary angiogram;  Surgeon: Jose G Rm MD;  Location: [facility] CATH INVASIVE LOCATION;  Service: Cardiovascular;  Laterality: N/A;   • CARDIAC CATHETERIZATION  5/24/2021    Procedure: Saphenous Vein Graft;  Surgeon: Jose G Rm MD;  Location: [facility] CATH INVASIVE LOCATION;  Service: Cardiovascular;;   • CARDIAC CATHETERIZATION N/A 5/24/2021    Procedure: Percutaneous Coronary Intervention;  Surgeon: Bernabe Zambrano MD;  Location:  [facility] CATH INVASIVE LOCATION;  Service: Cardiology;  Laterality: N/A;   • CARDIAC CATHETERIZATION N/A 5/24/2021    Procedure: Stent NIELS coronary;  Surgeon: Bernabe Zambrano MD;  Location:  [facility] CATH INVASIVE LOCATION;  Service: Cardiology;  Laterality: N/A;   • CARDIAC CATHETERIZATION N/A 5/26/2021    Procedure: Percutaneous Coronary Intervention;  Surgeon: Bernabe Zambrano MD;  Location:  [facility] CATH INVASIVE LOCATION;  Service: Cardiovascular;  Laterality: N/A;   • CARDIAC CATHETERIZATION N/A 5/26/2021    Procedure: Stent NIELS bypass graft;  Surgeon: Bernabe Zambrano MD;  Location: [facility] CATH INVASIVE LOCATION;  Service: Cardiovascular;  Laterality:  N/A;   • CARDIAC ELECTROPHYSIOLOGY PROCEDURE N/A 5/24/2021    Procedure: Temporary Pacemaker;  Surgeon: Bernaeb Zambrano MD;  Location:  [facility] CATH INVASIVE LOCATION;  Service: Cardiology;  Laterality: N/A;   • CARDIAC ELECTROPHYSIOLOGY PROCEDURE N/A 5/26/2021    Procedure: Temporary Pacemaker;  Surgeon: Bernabe Zambrano MD;  Location:  [facility] CATH INVASIVE LOCATION;  Service: Cardiovascular;  Laterality: N/A;   • CARPAL TUNNEL RELEASE Left 04/29/2018    carpal tunnel- lt hand// other hand surgeries    • CATARACT EXTRACTION, BILATERAL  2002    Dr. Lux Acosta   • CHOLECYSTECTOMY     • COLON RESECTION  2005   • CORONARY ANGIOPLASTY     • CORONARY ANGIOPLASTY WITH STENT PLACEMENT  11/13/2015    PTCA stent to proximal in stent and mid to distal lad   • CORONARY ANGIOPLASTY WITH STENT PLACEMENT  09/16/2016    PTCA stent to mid lad and stent to vein graft to marginal   • CORONARY ARTERY BYPASS GRAFT  2005    @ [facility]   • CYST REMOVAL      cyst removed from scrotum   • FOOT SURGERY Right 07/17/2018   • FOOT SURGERY Left 02/04/2019   • TOE AMPUTATION Right     right toe removed D/T infected cut that went to the bone       Family History: family history includes Diabetes in his brother and sister; Heart disease in his father, mother, and sister; Mental illness in his brother. Otherwise pertinent FHx was reviewed and not pertinent to current issue.    Social History:  reports that he has quit smoking. His smoking use included cigarettes. He has a 60.00 pack-year smoking history. He has never used smokeless tobacco. He reports current drug use. Frequency: 1.00 time per week. Drug: Marijuana. He reports that he does not drink alcohol.    Home Medications:  Prior to Admission Medications     Prescriptions Last Dose Informant Patient Reported? Taking?    albuterol sulfate  (90 Base) MCG/ACT inhaler  Nursing Home Yes No    Inhale 2 puffs Every 4 (Four) Hours As Needed.    apixaban (ELIQUIS) 5 MG tablet tablet  Nursing Home Yes No     Take 5 mg by mouth Daily.    aspirin 81 MG chewable tablet  Nursing Home Yes No    Chew 81 mg Daily.    atorvastatin (LIPITOR) 40 MG tablet  Nursing Home Yes No    Take 40 mg by mouth Every Night.    cholecalciferol (VITAMIN D3) 25 MCG (1000 UT) tablet  Nursing Home Yes No    Take 1,000 Units by mouth Daily.    dextrose (GLUTOSE) 40 % gel  Nursing Home Yes No    Take  by mouth Every 1 (One) Hour As Needed for Low Blood Sugar.    donepezil (ARICEPT) 10 MG tablet  Nursing Home Yes No    Take 10 mg by mouth Every Night.    DULoxetine HCl (DRIZALMA) 60 MG capsule  Nursing Home Yes No    Take 60 mg by mouth Daily.    furosemide (LASIX) 40 MG tablet  Nursing Home No No    Take 1 tablet by mouth 2 (Two) Times a Day for 30 days.    gabapentin (NEURONTIN) 100 MG capsule  Pharmacy No No    Take 2 capsules by mouth 2 (two) times a day.    hydrALAZINE (APRESOLINE) 50 MG tablet  Nursing Home Yes No    Take 50 mg by mouth 2 (two) times a day. Hold for SBP <110    isosorbide mononitrate (IMDUR) 60 MG 24 hr tablet  Nursing Home Yes No    Take 60 mg by mouth 2 (Two) Times a Day.    metoprolol succinate XL (TOPROL-XL) 100 MG 24 hr tablet   No No    Take 1 tablet by mouth Daily for 30 days.    nitroglycerin (NITROSTAT) 0.4 MG SL tablet  Nursing Home No No    Place 1 tablet under the tongue Every 5 (Five) Minutes As Needed for Chest Pain (Only if SBP Greater Than 100). Take no more than 3 doses in 15 minutes.    spironolactone (ALDACTONE) 25 MG tablet   No No    Take 1 tablet by mouth Daily for 30 days.    tiotropium (Spiriva HandiHaler) 18 MCG per inhalation capsule  Nursing Home No No    Place 1 capsule into inhaler and inhale Daily.    vitamin B-12 (CYANOCOBALAMIN) 1000 MCG tablet  Nursing Home Yes No    Take 1,000 mcg by mouth Daily.            Allergies:  Allergies   Allergen Reactions   • Codeine Itching       Objective      Vitals:   Temp:  [97 °F (36.1 °C)-98.3 °F (36.8 °C)] 97.6 °F (36.4 °C)  Heart Rate:  [] 81  Resp:   [18-22] 20  BP: (112-148)/(52-89) 132/52  Flow (L/min):  [2-4] 2    Physical Exam  Constitutional:       Appearance: He is ill-appearing.   HENT:      Head: Normocephalic and atraumatic.      Nose: Nose normal.      Mouth/Throat:      Mouth: Mucous membranes are dry.   Eyes:      General: Visual field deficit present. No scleral icterus.  Cardiovascular:      Rate and Rhythm: Normal rate. Rhythm irregular.      Heart sounds: Murmur heard.   Systolic murmur is present with a grade of 2/6.     Pulmonary:      Effort: Pulmonary effort is normal.      Breath sounds: Normal air entry. Examination of the right-middle field reveals decreased breath sounds. Examination of the left-middle field reveals decreased breath sounds. Examination of the right-lower field reveals decreased breath sounds. Examination of the left-lower field reveals decreased breath sounds. Decreased breath sounds present.   Abdominal:      General: Bowel sounds are normal.      Palpations: Abdomen is soft.      Tenderness: There is no abdominal tenderness. There is no guarding.   Musculoskeletal:      Cervical back: Normal range of motion.      Right lower leg: Edema present.      Left lower leg: Edema present.   Skin:     General: Skin is warm and dry.      Coloration: Skin is sallow.      Findings: Ecchymosis present.   Neurological:      Mental Status: He is lethargic.      Motor: Weakness present.   Psychiatric:         Attention and Perception: He is inattentive.         Mood and Affect: Affect is flat.         Speech: Speech is delayed.         Behavior: Behavior is slowed.          Result Review    Result Review:  I have personally reviewed the results from the time of this admission to 8/4/2021 06:30 EDT and agree with these findings:  [x]  Laboratory  [x]  Microbiology  [x]  Radiology  [x]  EKG/Telemetry   [x]  Cardiology/Vascular   []  Pathology  []  Old records  []  Other:        Assessment/Plan        Active Hospital Problems:  Active  Hospital Problems    Diagnosis    • Acute on chronic systolic heart failure (CMS/HCC)    • Elevated troponin    • CKD (chronic kidney disease) stage 3, GFR 30-59 ml/min (CMS/HCC)    • Mixed hyperlipidemia    • Chronic obstructive pulmonary disease, unspecified (CMS/HCC)    • Paroxysmal atrial fibrillation (CMS/HCC)    • Essential hypertension    • Chronic foot ulcer (CMS/HCC)    • Coronary artery disease    • Diabetic neuropathy (CMS/HCC)    • Tobacco use disorder    • Flaccid hemiplegia of right dominant side as late effect of cerebral infarction (CMS/[facility])    • Major depressive disorder, recurrent, mild (CMS/[facility])    • Unspecified dementia with behavioral disturbance (CMS/[facility])    • Immobility syndrome    • S/P CABG (coronary artery bypass graft)    • Chronic venous hypertension (idiopathic) with ulcer and inflammation of bilateral lower extremity (CMS/HCC)    • History of amputation of lesser toe (CMS/[facility])    • ANNETTE treated with BiPAP      Paroxysmal atrial fibrillation:  -Patient treated in the ED with 10 mg IV Cardizem, IV Cardizem drip initiated  -Cardiology consulted by the ED provider, patient under the care of Dr. Rm  -Continue Cardizem drip titrate to keep heart rate less than 120  -Continuous cardiac monitoring  -Hold home Eliquis, metoprolol for possible cardiac procedures in the a.m.  -N.p.o. at midnight for possible cardiac procedures in the a.m.    Acute on chronic systolic heart failure:  -Secondary to hypertensive heart disease  -Upon arrival to the ED proBNP 7648  -40 mg IV Lasix every 8 hours  -N.p.o. at midnight for possible cardiac procedures in the a.m.  -Echocardiogram 07/21/2021 shows EF 30%  -Continuous cardiac monitoring  -EKG now and every 6 hours x2  -Troponin level now and every 6 hours x 2  -O2 as needed to keep sats greater than 90%  -Continue home medication Aldactone 25 mg p.o. daily    Elevated troponin:  -Upon arrival to the ED troponin 0.117  -Cardiology consulted by the ED  provider, patient under care of Dr. Rm  -EKG now and every 6 hours x 2  -Troponin level now and every 6 hours x 2  -Continuous cardiac monitoring     CKD stage III:  -IV Lasix 40 mg every 8 hours  -Consult nephrology, patient under the care of Dr. Bhat  -Monitor BMP during admission  -Monitor intake and output  -Avoid nephrotoxic medication      Diabetes mellitus:  -Sliding scale insulin  -Consistent carbohydrate diet  -Before meals and at bedtime Accu-Cheks  -Hypoglycemia protocol ordered     Essential hypertension:  -Continue home medication hydralazine 50 mg p.o. twice daily  -Continue home medication isosorbide 60 mg p.o. daily     Dyslipidemia:  -Continue home medication atorvastatin 40 mg p.o. nightly     Obesity:  -encourage dietary modifications     Coronary artery disease/history of stent/history of CABG  -Continue ASA 81 mg p.o. daily  -Continue home medication Plavix 75 mg p.o. daily  -Continue home medication isosorbide 60 mg p.o. twice daily     COPD:  -O2 as needed to keep sats greater than 90%  -DuoNeb nebulizer treatment 4 times daily  -Home medications Spiriva nonformulary     ANNETTE with BiPAP:  -O2 as needed to keep sats greater than 90%  -May use home CPAP nightly as needed     Immobility syndrome:  -Acetaminophen 650 mg every 4 as needed for pain    History of CVA/right hemiaplasia:  -CVA in November 2020     Diabetic neuropathy:  -Continue home medication Cymbalta 60 mg p.o. daily     Depression:  -Continue home medication Cymbalta 60 mg p.o. daily     Unspecified dementia:  -Continue home medication Aricept 10 mg p.o. nightly          DVT prophylaxis:  No DVT prophylaxis order currently exists.    CODE STATUS:    Code Status: CPR  Medical Interventions (Level of Support Prior to Arrest): Full    Admission Status:  I believe this patient meets observation status.    I discussed the patient's findings and my recommendations with the patient.     This patient has been interviewed wearing  appropriate personal protective equipment and findings discussed with the ED provider.        Signature: Electronically signed by FABIOLA Townsend, 08/04/21, 6:42 AM EDT.

## 2021-08-05 LAB
ABO GROUP BLD: NORMAL
ANION GAP SERPL CALCULATED.3IONS-SCNC: 12 MMOL/L (ref 5–15)
BASOPHILS # BLD AUTO: 0.1 10*3/MM3 (ref 0–0.2)
BASOPHILS NFR BLD AUTO: 1.9 % (ref 0–1.5)
BLD GP AB SCN SERPL QL: NEGATIVE
BUN SERPL-MCNC: 68 MG/DL (ref 8–23)
BUN/CREAT SERPL: 33.3 (ref 7–25)
CALCIUM SPEC-SCNC: 9 MG/DL (ref 8.6–10.5)
CHLORIDE SERPL-SCNC: 98 MMOL/L (ref 98–107)
CO2 SERPL-SCNC: 31 MMOL/L (ref 22–29)
CREAT SERPL-MCNC: 2.04 MG/DL (ref 0.76–1.27)
DEPRECATED RDW RBC AUTO: 46.8 FL (ref 37–54)
EOSINOPHIL # BLD AUTO: 0.6 10*3/MM3 (ref 0–0.4)
EOSINOPHIL NFR BLD AUTO: 8.6 % (ref 0.3–6.2)
ERYTHROCYTE [DISTWIDTH] IN BLOOD BY AUTOMATED COUNT: 15.5 % (ref 12.3–15.4)
GFR SERPL CREATININE-BSD FRML MDRD: 32 ML/MIN/1.73
GLUCOSE BLDC GLUCOMTR-MCNC: 145 MG/DL (ref 70–105)
GLUCOSE BLDC GLUCOMTR-MCNC: 150 MG/DL (ref 70–105)
GLUCOSE BLDC GLUCOMTR-MCNC: 153 MG/DL (ref 70–105)
GLUCOSE BLDC GLUCOMTR-MCNC: 91 MG/DL (ref 70–105)
GLUCOSE SERPL-MCNC: 71 MG/DL (ref 65–99)
HCT VFR BLD AUTO: 21 % (ref 37.5–51)
HGB BLD-MCNC: 7 G/DL (ref 13–17.7)
LYMPHOCYTES # BLD AUTO: 1.7 10*3/MM3 (ref 0.7–3.1)
LYMPHOCYTES NFR BLD AUTO: 26.9 % (ref 19.6–45.3)
MAGNESIUM SERPL-MCNC: 2.1 MG/DL (ref 1.6–2.4)
MCH RBC QN AUTO: 28.4 PG (ref 26.6–33)
MCHC RBC AUTO-ENTMCNC: 33.1 G/DL (ref 31.5–35.7)
MCV RBC AUTO: 86 FL (ref 79–97)
MONOCYTES # BLD AUTO: 1 10*3/MM3 (ref 0.1–0.9)
MONOCYTES NFR BLD AUTO: 15.7 % (ref 5–12)
NEUTROPHILS NFR BLD AUTO: 3 10*3/MM3 (ref 1.7–7)
NEUTROPHILS NFR BLD AUTO: 46.9 % (ref 42.7–76)
NRBC BLD AUTO-RTO: 0.1 /100 WBC (ref 0–0.2)
PHOSPHATE SERPL-MCNC: 5.1 MG/DL (ref 2.5–4.5)
PLATELET # BLD AUTO: 310 10*3/MM3 (ref 140–450)
PMV BLD AUTO: 8 FL (ref 6–12)
POTASSIUM SERPL-SCNC: 4.6 MMOL/L (ref 3.5–5.2)
QT INTERVAL: 319 MS
RBC # BLD AUTO: 2.45 10*6/MM3 (ref 4.14–5.8)
RH BLD: POSITIVE
SODIUM SERPL-SCNC: 141 MMOL/L (ref 136–145)
T&S EXPIRATION DATE: NORMAL
WBC # BLD AUTO: 6.4 10*3/MM3 (ref 3.4–10.8)

## 2021-08-05 PROCEDURE — 99233 SBSQ HOSP IP/OBS HIGH 50: CPT | Performed by: INTERNAL MEDICINE

## 2021-08-05 PROCEDURE — 86900 BLOOD TYPING SEROLOGIC ABO: CPT | Performed by: INTERNAL MEDICINE

## 2021-08-05 PROCEDURE — 83735 ASSAY OF MAGNESIUM: CPT | Performed by: INTERNAL MEDICINE

## 2021-08-05 PROCEDURE — 94799 UNLISTED PULMONARY SVC/PX: CPT

## 2021-08-05 PROCEDURE — 25010000002 CHLOROTHIAZIDE PER 500 MG: Performed by: INTERNAL MEDICINE

## 2021-08-05 PROCEDURE — 86901 BLOOD TYPING SEROLOGIC RH(D): CPT | Performed by: INTERNAL MEDICINE

## 2021-08-05 PROCEDURE — 86850 RBC ANTIBODY SCREEN: CPT | Performed by: INTERNAL MEDICINE

## 2021-08-05 PROCEDURE — 85025 COMPLETE CBC W/AUTO DIFF WBC: CPT | Performed by: INTERNAL MEDICINE

## 2021-08-05 PROCEDURE — 63710000001 INSULIN LISPRO (HUMAN) PER 5 UNITS: Performed by: INTERNAL MEDICINE

## 2021-08-05 PROCEDURE — 25010000002 FUROSEMIDE PER 20 MG: Performed by: INTERNAL MEDICINE

## 2021-08-05 PROCEDURE — 84100 ASSAY OF PHOSPHORUS: CPT | Performed by: INTERNAL MEDICINE

## 2021-08-05 PROCEDURE — 82962 GLUCOSE BLOOD TEST: CPT

## 2021-08-05 PROCEDURE — 86923 COMPATIBILITY TEST ELECTRIC: CPT

## 2021-08-05 PROCEDURE — 63710000001 INSULIN ISOPHANE & REGULAR PER 5 UNITS: Performed by: INTERNAL MEDICINE

## 2021-08-05 PROCEDURE — 25010000002 NA FERRIC GLUC CPLX PER 12.5 MG: Performed by: INTERNAL MEDICINE

## 2021-08-05 PROCEDURE — 80048 BASIC METABOLIC PNL TOTAL CA: CPT | Performed by: INTERNAL MEDICINE

## 2021-08-05 RX ORDER — FUROSEMIDE 40 MG/1
80 TABLET ORAL
Status: DISCONTINUED | OUTPATIENT
Start: 2021-08-05 | End: 2021-08-10

## 2021-08-05 RX ADMIN — GABAPENTIN 200 MG: 100 CAPSULE ORAL at 22:22

## 2021-08-05 RX ADMIN — Medication 10 ML: at 22:23

## 2021-08-05 RX ADMIN — IPRATROPIUM BROMIDE AND ALBUTEROL SULFATE 3 ML: 2.5; .5 SOLUTION RESPIRATORY (INHALATION) at 10:55

## 2021-08-05 RX ADMIN — INSULIN HUMAN 30 UNITS: 100 INJECTION, SUSPENSION SUBCUTANEOUS at 09:16

## 2021-08-05 RX ADMIN — IPRATROPIUM BROMIDE AND ALBUTEROL SULFATE 3 ML: 2.5; .5 SOLUTION RESPIRATORY (INHALATION) at 20:44

## 2021-08-05 RX ADMIN — FUROSEMIDE 80 MG: 10 INJECTION, SOLUTION INTRAMUSCULAR; INTRAVENOUS at 06:18

## 2021-08-05 RX ADMIN — INSULIN LISPRO 2 UNITS: 100 INJECTION, SOLUTION INTRAVENOUS; SUBCUTANEOUS at 12:34

## 2021-08-05 RX ADMIN — Medication 1000 UNITS: at 09:15

## 2021-08-05 RX ADMIN — ASPIRIN 81 MG: 81 TABLET, CHEWABLE ORAL at 09:15

## 2021-08-05 RX ADMIN — FUROSEMIDE 80 MG: 40 TABLET ORAL at 17:42

## 2021-08-05 RX ADMIN — CHLOROTHIAZIDE SODIUM 500 MG: 500 INJECTION, POWDER, LYOPHILIZED, FOR SOLUTION INTRAVENOUS at 22:24

## 2021-08-05 RX ADMIN — HYDRALAZINE HYDROCHLORIDE 50 MG: 25 TABLET, FILM COATED ORAL at 22:22

## 2021-08-05 RX ADMIN — HYDRALAZINE HYDROCHLORIDE 50 MG: 25 TABLET, FILM COATED ORAL at 09:15

## 2021-08-05 RX ADMIN — GABAPENTIN 200 MG: 100 CAPSULE ORAL at 09:15

## 2021-08-05 RX ADMIN — DONEPEZIL HYDROCHLORIDE 10 MG: 5 TABLET, FILM COATED ORAL at 22:22

## 2021-08-05 RX ADMIN — CARVEDILOL 6.25 MG: 6.25 TABLET, FILM COATED ORAL at 09:15

## 2021-08-05 RX ADMIN — CARVEDILOL 6.25 MG: 6.25 TABLET, FILM COATED ORAL at 17:42

## 2021-08-05 RX ADMIN — DULOXETINE 60 MG: 30 CAPSULE, DELAYED RELEASE ORAL at 09:15

## 2021-08-05 RX ADMIN — SODIUM CHLORIDE 250 MG: 9 INJECTION, SOLUTION INTRAVENOUS at 17:42

## 2021-08-05 RX ADMIN — IPRATROPIUM BROMIDE AND ALBUTEROL SULFATE 3 ML: 2.5; .5 SOLUTION RESPIRATORY (INHALATION) at 08:09

## 2021-08-05 RX ADMIN — AMIODARONE HYDROCHLORIDE 400 MG: 200 TABLET ORAL at 09:15

## 2021-08-05 RX ADMIN — SPIRONOLACTONE 25 MG: 25 TABLET ORAL at 09:15

## 2021-08-05 RX ADMIN — AMIODARONE HYDROCHLORIDE 400 MG: 200 TABLET ORAL at 22:22

## 2021-08-05 RX ADMIN — ISOSORBIDE MONONITRATE 60 MG: 60 TABLET, EXTENDED RELEASE ORAL at 09:15

## 2021-08-05 RX ADMIN — INSULIN HUMAN 30 UNITS: 100 INJECTION, SUSPENSION SUBCUTANEOUS at 17:42

## 2021-08-05 RX ADMIN — ATORVASTATIN CALCIUM 40 MG: 40 TABLET, FILM COATED ORAL at 22:22

## 2021-08-05 RX ADMIN — CYANOCOBALAMIN TAB 1000 MCG 1000 MCG: 1000 TAB at 09:15

## 2021-08-05 RX ADMIN — ISOSORBIDE MONONITRATE 60 MG: 60 TABLET, EXTENDED RELEASE ORAL at 17:42

## 2021-08-05 RX ADMIN — Medication 10 ML: at 09:15

## 2021-08-05 NOTE — PROGRESS NOTES
Referring Provider: Stanton Junior MD    Reason for follow-up:  Atrial fibrillation  Cardiomyopathy  Shortness of breath     Patient Care Team:  Samantha Zabala APRN as PCP - General  Samantha Zabala APRN as PCP - Family Medicine  Jose G Rm MD as Consulting Physician (Cardiology)    Subjective .  Feeling better     ROS    Since I have last seen him yesterday, the patient has been without any chest discomfort ,shortness of breath, palpitations, dizziness or syncope.  Denies having any headache ,abdominal pain ,nausea, vomiting , diarrhea constipation, loss of weight or loss of appetite.  Denies having any excessive bruising ,hematuria or blood in the stool.    Review of all systems negative except as indicated    History  Past Medical History:   Diagnosis Date   • Appetite loss    • Brain bleed (CMS/HCC)    • Carpal tunnel syndrome on left     carpal tunnel on left hand   • CHF (congestive heart failure) (CMS/HCC)    • Diabetic neuropathy (CMS/HCC)    • DM2 (diabetes mellitus, type 2) (CMS/HCC)    • History of angina    • Hyperlipidemia    • Hypogonadism in male    • Obesity    • Sleep apnea    • Stroke (CMS/HCC)    • Unsteady gait        Past Surgical History:   Procedure Laterality Date   • ANGIOPLASTY      X2   • APPENDECTOMY     • CARDIAC CATHETERIZATION  11/13/2015   • CARDIAC CATHETERIZATION N/A 5/24/2021    Procedure: Left Heart Cath and coronary angiogram;  Surgeon: Jose G Rm MD;  Location: [facility] CATH INVASIVE LOCATION;  Service: Cardiovascular;  Laterality: N/A;   • CARDIAC CATHETERIZATION  5/24/2021    Procedure: Saphenous Vein Graft;  Surgeon: Jose G Rm MD;  Location: [facility] CATH INVASIVE LOCATION;  Service: Cardiovascular;;   • CARDIAC CATHETERIZATION N/A 5/24/2021    Procedure: Percutaneous Coronary Intervention;  Surgeon: Bernabe Zambrano MD;  Location: [facility] CATH INVASIVE LOCATION;  Service: Cardiology;  Laterality: N/A;   • CARDIAC CATHETERIZATION N/A 5/24/2021    Procedure: Stent  NIELS coronary;  Surgeon: Bernabe Zambrano MD;  Location:  [facility] CATH INVASIVE LOCATION;  Service: Cardiology;  Laterality: N/A;   • CARDIAC CATHETERIZATION N/A 5/26/2021    Procedure: Percutaneous Coronary Intervention;  Surgeon: Bernabe Zambrano MD;  Location:  [facility] CATH INVASIVE LOCATION;  Service: Cardiovascular;  Laterality: N/A;   • CARDIAC CATHETERIZATION N/A 5/26/2021    Procedure: Stent NIELS bypass graft;  Surgeon: Bernabe Zambrano MD;  Location: [facility] CATH INVASIVE LOCATION;  Service: Cardiovascular;  Laterality: N/A;   • CARDIAC ELECTROPHYSIOLOGY PROCEDURE N/A 5/24/2021    Procedure: Temporary Pacemaker;  Surgeon: Bernabe Zambrano MD;  Location:  [facility] CATH INVASIVE LOCATION;  Service: Cardiology;  Laterality: N/A;   • CARDIAC ELECTROPHYSIOLOGY PROCEDURE N/A 5/26/2021    Procedure: Temporary Pacemaker;  Surgeon: Bernabe Zambrano MD;  Location: [facility] CATH INVASIVE LOCATION;  Service: Cardiovascular;  Laterality: N/A;   • CARPAL TUNNEL RELEASE Left 04/29/2018    carpal tunnel- lt hand// other hand surgeries    • CATARACT EXTRACTION, BILATERAL  2002    Dr. Lux Acosta   • CHOLECYSTECTOMY     • COLON RESECTION  2005   • CORONARY ANGIOPLASTY     • CORONARY ANGIOPLASTY WITH STENT PLACEMENT  11/13/2015    PTCA stent to proximal in stent and mid to distal lad   • CORONARY ANGIOPLASTY WITH STENT PLACEMENT  09/16/2016    PTCA stent to mid lad and stent to vein graft to marginal   • CORONARY ARTERY BYPASS GRAFT  2005    @ [facility]   • CYST REMOVAL      cyst removed from scrotum   • FOOT SURGERY Right 07/17/2018   • FOOT SURGERY Left 02/04/2019   • TOE AMPUTATION Right     right toe removed D/T infected cut that went to the bone       Family History   Problem Relation Age of Onset   • Heart disease Mother    • Heart disease Father    • Diabetes Sister    • Heart disease Sister    • Diabetes Brother    • Mental illness Brother        Social History     Tobacco Use   • Smoking status: Former Smoker     Packs/day: 1.00     Years: 60.00      Pack years: 60.00     Types: Cigarettes   • Smokeless tobacco: Never Used   • Tobacco comment: Patient quit smoking 11/2020   Vaping Use   • Vaping Use: Never used   Substance Use Topics   • Alcohol use: No   • Drug use: Yes     Frequency: 1.0 times per week     Types: Marijuana     Comment: socially        Medications Prior to Admission   Medication Sig Dispense Refill Last Dose   • albuterol sulfate  (90 Base) MCG/ACT inhaler Inhale 2 puffs Every 4 (Four) Hours As Needed.      • apixaban (ELIQUIS) 5 MG tablet tablet Take 5 mg by mouth Daily.      • aspirin 81 MG chewable tablet Chew 81 mg Daily.      • atorvastatin (LIPITOR) 40 MG tablet Take 40 mg by mouth Every Night.      • cholecalciferol (VITAMIN D3) 25 MCG (1000 UT) tablet Take 1,000 Units by mouth Daily.      • dextrose (GLUTOSE) 40 % gel Take  by mouth Every 1 (One) Hour As Needed for Low Blood Sugar.      • donepezil (ARICEPT) 10 MG tablet Take 10 mg by mouth Every Night.      • DULoxetine HCl (DRIZALMA) 60 MG capsule Take 60 mg by mouth Daily.      • furosemide (LASIX) 40 MG tablet Take 1 tablet by mouth 2 (Two) Times a Day for 30 days. 60 tablet 0    • gabapentin (NEURONTIN) 100 MG capsule Take 2 capsules by mouth 2 (two) times a day. 6 capsule 0    • hydrALAZINE (APRESOLINE) 50 MG tablet Take 50 mg by mouth 2 (two) times a day. Hold for SBP <110      • isosorbide mononitrate (IMDUR) 60 MG 24 hr tablet Take 60 mg by mouth 2 (Two) Times a Day.      • metoprolol succinate XL (TOPROL-XL) 100 MG 24 hr tablet Take 1 tablet by mouth Daily for 30 days. 30 tablet 0    • nitroglycerin (NITROSTAT) 0.4 MG SL tablet Place 1 tablet under the tongue Every 5 (Five) Minutes As Needed for Chest Pain (Only if SBP Greater Than 100). Take no more than 3 doses in 15 minutes. 30 tablet 12    • spironolactone (ALDACTONE) 25 MG tablet Take 1 tablet by mouth Daily for 30 days. 30 tablet 0    • tiotropium (Spiriva HandiHaler) 18 MCG per inhalation capsule Place 1  "capsule into inhaler and inhale Daily. 30 capsule 1    • vitamin B-12 (CYANOCOBALAMIN) 1000 MCG tablet Take 1,000 mcg by mouth Daily.          Allergies  Codeine    Scheduled Meds:amiodarone, 400 mg, Oral, Q12H  aspirin, 81 mg, Oral, Daily  atorvastatin, 40 mg, Oral, Nightly  carvedilol, 6.25 mg, Oral, BID With Meals  cholecalciferol, 1,000 Units, Oral, Daily  dilTIAZem, 10 mg, Intravenous, Once  dilTIAZem CD, 240 mg, Oral, Q24H  donepezil, 10 mg, Oral, Nightly  DULoxetine, 60 mg, Oral, Daily  furosemide, 80 mg, Intravenous, Q8H  gabapentin, 200 mg, Oral, Q12H  hydrALAZINE, 50 mg, Oral, Q12H  insulin lispro, 0-9 Units, Subcutaneous, TID AC  insulin NPH-insulin regular, 30 Units, Subcutaneous, BID With Meals  ipratropium-albuterol, 3 mL, Nebulization, 4x Daily - RT  isosorbide mononitrate, 60 mg, Oral, BID - Nitrates  sodium chloride, 10 mL, Intravenous, Q12H  spironolactone, 25 mg, Oral, Daily  vitamin B-12, 1,000 mcg, Oral, Daily      Continuous Infusions:   PRN Meds:.•  acetaminophen **OR** acetaminophen **OR** acetaminophen  •  aluminum-magnesium hydroxide-simethicone  •  dextrose  •  dextrose  •  glucagon (human recombinant)  •  insulin lispro **AND** insulin lispro  •  melatonin  •  nitroglycerin  •  ondansetron **OR** ondansetron  •  [COMPLETED] Insert peripheral IV **AND** sodium chloride  •  sodium chloride    Objective     VITAL SIGNS  Vitals:    08/04/21 1948 08/04/21 2306 08/05/21 0301 08/05/21 0551   BP:  110/52 137/49 145/58   BP Location:       Patient Position:       Pulse: 76 64 64 76   Resp: 20 18 20 18   Temp:  98.6 °F (37 °C) 98.4 °F (36.9 °C) 97.8 °F (36.6 °C)   TempSrc:  Oral Oral Oral   SpO2:  99% 97% 100%   Weight:    115 kg (253 lb 4.9 oz)   Height:           Flowsheet Rows      First Filed Value   Admission Height  180.3 cm (71\") Documented at 08/03/2021 1733   Admission Weight  113 kg (250 lb) Documented at 08/03/2021 1733            Intake/Output Summary (Last 24 hours) at 8/5/2021 " 0620  Last data filed at 8/5/2021 0301  Gross per 24 hour   Intake --   Output 1175 ml   Net -1175 ml        TELEMETRY: Atrial fibrillation with controlled ventricular response.    Physical Exam:  The patient is alert, oriented and in no distress.  Vital signs as noted above.  Exogenous obesity.  Head and neck revealed no carotid bruits or jugular venous distention.  No thyromegaly or lymphadenopathy is present  Lungs clear.  No wheezing.  Breath sounds are normal bilaterally.  Heart normal first and second heart sounds.  No murmur. No precordial rub is present.  No gallop is present.  Abdomen soft and nontender.  No organomegaly is present.  Extremities with good peripheral pulses without any pedal edema.  Skin warm and dry.  Musculoskeletal system is grossly normal  CNS grossly normal      Results Review:   I reviewed the patient's new clinical results.  Lab Results (last 24 hours)     Procedure Component Value Units Date/Time    Basic Metabolic Panel [[id number]]  (Abnormal) Collected: 08/05/21 0300    Specimen: Blood Updated: 08/05/21 0400     Glucose 71 mg/dL      BUN 68 mg/dL      Creatinine 2.04 mg/dL      Sodium 141 mmol/L      Potassium 4.6 mmol/L      Chloride 98 mmol/L      CO2 31.0 mmol/L      Calcium 9.0 mg/dL      eGFR Non African Amer 32 mL/min/1.73      BUN/Creatinine Ratio 33.3     Anion Gap 12.0 mmol/L     Narrative:      GFR Normal >60  Chronic Kidney Disease <60  Kidney Failure <15      Phosphorus [[id number]]  (Abnormal) Collected: 08/05/21 0300    Specimen: Blood Updated: 08/05/21 0400     Phosphorus 5.1 mg/dL     Magnesium [[id number]]  (Normal) Collected: 08/05/21 0300    Specimen: Blood Updated: 08/05/21 0400     Magnesium 2.1 mg/dL     CBC & Differential [[id number]]  (Abnormal) Collected: 08/05/21 0300    Specimen: Blood Updated: 08/05/21 0327    Narrative:      The following orders were created for panel order CBC & Differential.  Procedure                               Abnormality          Status                     ---------                               -----------         ------                     CBC Auto Differential[[id number]]        Abnormal            Final result                 Please view results for these tests on the individual orders.    CBC Auto Differential [[id number]]  (Abnormal) Collected: 08/05/21 0300    Specimen: Blood Updated: 08/05/21 0327     WBC 6.40 10*3/mm3      RBC 2.45 10*6/mm3      Hemoglobin 7.0 g/dL      Hematocrit 21.0 %      MCV 86.0 fL      MCH 28.4 pg      MCHC 33.1 g/dL      RDW 15.5 %      RDW-SD 46.8 fl      MPV 8.0 fL      Platelets 310 10*3/mm3      Neutrophil % 46.9 %      Lymphocyte % 26.9 %      Monocyte % 15.7 %      Eosinophil % 8.6 %      Basophil % 1.9 %      Neutrophils, Absolute 3.00 10*3/mm3      Lymphocytes, Absolute 1.70 10*3/mm3      Monocytes, Absolute 1.00 10*3/mm3      Eosinophils, Absolute 0.60 10*3/mm3      Basophils, Absolute 0.10 10*3/mm3      nRBC 0.1 /100 WBC     POC Glucose Once [[id number]]  (Abnormal) Collected: 08/04/21 2100    Specimen: Blood Updated: 08/04/21 2101     Glucose 164 mg/dL      Comment: Serial Number: [id number]:  [id number]       POC Glucose Once [[id number]]  (Abnormal) Collected: 08/04/21 1633    Specimen: Blood Updated: 08/04/21 1634     Glucose 190 mg/dL      Comment: Serial Number: [id number]:  [id number]       POC Glucose Once [[id number]]  (Abnormal) Collected: 08/04/21 1109    Specimen: Blood Updated: 08/04/21 1110     Glucose 168 mg/dL      Comment: Serial Number: [id number]:  [id number]             Imaging Results (Last 24 Hours)     ** No results found for the last 24 hours. **      LAB RESULTS (LAST 7 DAYS)    CBC  Results from last 7 days   Lab Units 08/05/21  0300 08/04/21  0519 08/03/21  1837 07/31/21  0238   WBC 10*3/mm3 6.40 7.40 7.40 6.00   RBC 10*6/mm3 2.45* 2.48* 2.82* 2.76*   HEMOGLOBIN g/dL 7.0* 7.1* 8.0* 7.7*   HEMATOCRIT % 21.0* 21.4* 24.5* 23.8*   MCV fL 86.0 86.2 86.6 86.3    PLATELETS 10*3/mm3 310 312 334 297       BMP  Results from last 7 days   Lab Units 08/05/21  0300 08/04/21  0519 08/03/21  1837 08/02/21  0244 08/01/21 0305 07/31/21  0238 07/30/21  0225   SODIUM mmol/L 141 140 139 137 141 139 138   POTASSIUM mmol/L 4.6 4.6 4.5 4.6 4.7 4.6 4.4   CHLORIDE mmol/L 98 97* 95* 97* 100 100 97*   CO2 mmol/L 31.0* 33.0* 32.0* 34.0* 33.0* 31.0* 31.0*   BUN mg/dL 68* 65* 68* 64* 55* 51* 52*   CREATININE mg/dL 2.04* 1.95* 1.91* 1.95* 1.99* 1.86* 1.98*   GLUCOSE mg/dL 71 171* 198* 92 64* 99 93   MAGNESIUM mg/dL 2.1  --   --   --   --   --  2.2   PHOSPHORUS mg/dL 5.1*  --   --   --   --   --   --        CMP   Results from last 7 days   Lab Units 08/05/21  0300 08/04/21  0519 08/03/21  1837 08/02/21  0244 08/01/21 0305 07/31/21  0238 07/30/21  0225   SODIUM mmol/L 141 140 139 137 141 139 138   POTASSIUM mmol/L 4.6 4.6 4.5 4.6 4.7 4.6 4.4   CHLORIDE mmol/L 98 97* 95* 97* 100 100 97*   CO2 mmol/L 31.0* 33.0* 32.0* 34.0* 33.0* 31.0* 31.0*   BUN mg/dL 68* 65* 68* 64* 55* 51* 52*   CREATININE mg/dL 2.04* 1.95* 1.91* 1.95* 1.99* 1.86* 1.98*   GLUCOSE mg/dL 71 171* 198* 92 64* 99 93         BNP        TROPONIN  Results from last 7 days   Lab Units 08/04/21 0519   TROPONIN T ng/mL 0.792*       CoAg        Creatinine Clearance  Estimated Creatinine Clearance: 40.8 mL/min (A) (by C-G formula based on SCr of 2.04 mg/dL (H)).    ABG        Radiology  XR Chest 1 View    Result Date: 8/3/2021  1. Cardiomegaly with pulmonary interstitial edema pattern and trace bilateral pleural effusions.  Electronically Signed By-Anselmo Vaz MD On:8/3/2021 7:00 PM This report was finalized on [id number] by  Anselmo Vaz MD.              EKG              I personally viewed and interpreted the patient's EKG/Telemetry data:    ECHOCARDIOGRAM:    Results for orders placed during the hospital encounter of 07/20/21    Adult Transthoracic Echo Complete With Contrast if Necessary Per Protocol    Interpretation  Summary  · Estimated left ventricular EF = 30% Left ventricular systolic function is moderately decreased.    Occasions  Shortness of breath.    Technically satisfactory study.  Mitral valve is structurally normal.  Mild mitral regurgitation.  Tricuspid valve is structurally normal.  Aortic valve is structurally normal.  Pulmonic valve could not be well visualized.  No evidence for tricuspid or aortic regurgitation is seen by Doppler study.  Biatrial and biventricular enlargement is present.  Diffuse left ventricular hypocontractility with ejection fraction of 30%.  Atrial septum is intact.  Aorta is normal.  No pericardial effusion or intracardiac thrombus is seen.    Impression  Mild mitral regurgitation.  Significant biatrial and biventricular enlargement.  Diffuse left ventricular hypocontractility with ejection fraction of 30%.  No pericardial effusion or intracardiac thrombus is seen.          STRESS MYOVIEW:    Cardiolite (Tc-99m Sestamibi) stress test    CARDIAC CATHETERIZATION:            OTHER:         Assessment/Plan     Principal Problem:    Atrial fibrillation with RVR (CMS/HCC)  Active Problems:    S/P CABG (coronary artery bypass graft)    Essential hypertension    Elevated troponin    ANNETTE treated with BiPAP    Mixed hyperlipidemia    Flaccid hemiplegia of right dominant side as late effect of cerebral infarction (CMS/HCC)    Diabetic neuropathy (CMS/HCC)    Unspecified dementia with behavioral disturbance (CMS/HCC)    Coronary artery disease    Chronic venous hypertension (idiopathic) with ulcer and inflammation of bilateral lower extremity (CMS/HCC)    Chronic obstructive pulmonary disease, unspecified (CMS/HCC)    Chronic foot ulcer (CMS/HCC)    CKD (chronic kidney disease) stage 3, GFR 30-59 ml/min (CMS/HCC)    Tobacco use disorder    Acute on chronic systolic heart failure (CMS/HCC)    Anemia of renal disease      [[[[[[[[[[[[[[[[[[[[[[[[[    Impression  ==============  -Increased shortness of  breath and palpitations     -Recent acute on chronic congestive heart failure.  Compensated at this time  Recent echocardiogram showed left ventricle dysfunction with ejection fraction of 35%.     -History of right-sided weakness with left MCA territory stroke.-Improved      -status post CABG 2005. status post stent to LAD 2009    Status post stent to LAD 11/13/2015  Status post stent to mid LAD and SVG to marginal branch 09/16/2016.  Status post stent to mid LAD 5/24/2021  Status post stent to SVG to RCA 5/26/2021     Cardiac catheterization 5/24/2021 revealed  Left ventricle angiogram was not performed due to pre-existing renal dysfunction.  Left main coronary artery normal.  Left anterior descending artery has mid segment lengthy 90% disease within the previously placed stent.  Distal left into descending artery has diffuse disease.  Circumflex coronary artery is totally occluded.  (Chronic)  Right coronary artery is chronically occluded.  SVG to marginal branch (jump graft to OM1 and OM 2) is totally occluded (new finding compared to 2015.  SVG to PDA has distal radiolucency.  However no definite obstructive disease is present.       -status post myocardial infarction 2000 and 2002 .      -history of intermittent atrial fibrillation-maintaining sinus rhythm     Transesophageal echocardiogram 11/30/2020.  Biatrial enlargement.  Smoking effect in the left atrium and left ventricle and left atrial appendage.  Mild mitral and aortic regurgitation.  Left ventricle enlargement with diffuse hypocontractility with ejection fraction of 35 to 40%.     Echocardiogram 11/27/2020 revealed left atrial enlargement left ventricle dysfunction with ejection fraction of 40%.  Negative bubble study.      -diabetes hypertension and sleep apnea in history of renal dysfunction .  Recent BUN/creatinine 38/1.36     -status post colon surgery (partial colectomy) appendectomy cholecystectomy right 4th toe removal and carpal tunnel  surgery      -continued smoking 1 pack per day -abstinence from smoking      -allergy to codeine.  ============   Plan  ================  Patient recently was discharged from the hospital.  Patient apparently was not receiving his medications appropriately.  Patient has atrial fibrillation with rapid ventricle response.  Patient is off intravenous Cardizem  Continue Coreg and p.o. Cardizem  Patient was started on amiodarone for rate control and hopefully rhythm control with conversion to sinus rhythm  Reduce amiodarone dose to maintenance dose of 200 mg a day after p.o. loading dose is completed.     Troponin level is slightly elevated  0.11  0.41  Trend troponin level     Significant renal dysfunction-chronic  BUN/68/1.91-8/4/2021.  BUN 64 creatinine 1.95-8/2/2021  68/2.04-8/5/2021     Anemia  hgb 7.1  7-8/5/2021     Status post CABG  Patient is not having any angina pectoris  Status post stent to LAD 5/24/2021.  Status post stent to SVG to RCA 5/26/2021.     History of Spontaneous prolonged episode of asystole is of concern.  Patient had recovered without any intervention or CPR.  We will closely observe for any bradycardia arrhythmias and patient may need pacemaker/ICD implantation.  No further episodes.     Paroxysmal atrial fibrillation.  Patient is mostly in sinus rhythm.      Anticoagulation  Patient is on Eliquis.     Medications were reviewed and updated.  Patient is on intravenous Lasix and spironolactone p.o.  Continue Coreg current Cardizem CD and amiodarone    Have discussed with attending nurse for coordination of care.    Possible transfer to rehab tomorrow depending on patient's progress.     Further plan will depend on patient's progress.   ]]]]]]]]]]]]]]]]]]]]]]           Jose G Rm MD  08/05/21  06:20 EDT

## 2021-08-05 NOTE — PLAN OF CARE
Problem: Adult Inpatient Plan of Care  Goal: Plan of Care Review  Outcome: Ongoing, Progressing  Flowsheets (Taken 8/5/2021 8797)  Plan of Care Reviewed With: patient   Goal Outcome Evaluation:  Plan of Care Reviewed With: patient         Pt A&O, plan for d/c back to [facility] at time of discharge. Pt currently on 2L NC. No c/o voiced at this time. Will cont to monitor.

## 2021-08-05 NOTE — CASE MANAGEMENT/SOCIAL WORK
Continued Stay Note   [place]     Patient Name: Benson Mclean  MRN: [id number]  Today's Date: 8/5/2021    Admit Date: 8/3/2021    Discharge Plan     Row Name 08/05/21 1550       Plan    Plan Comments  CM received notification that Edi will have patients meds when he comes back to facility. DC barriers: iv lasix, hgb 7.0, holding eliquis, renal and cards following        Expected Discharge Date and Time     Expected Discharge Date Expected Discharge Time    Aug 7, 2021         Phone communication or documentation only - no physical contact with patient or family.      Charlene Garcia RN

## 2021-08-05 NOTE — PLAN OF CARE
Pt rested throughout the night with no distress; will continue to monitor    Problem: Arrhythmia/Dysrhythmia  Goal: Normalized Cardiac Rhythm  Outcome: Ongoing, Progressing     Problem: Fall Injury Risk  Goal: Absence of Fall and Fall-Related Injury  Outcome: Ongoing, Progressing  Intervention: Identify and Manage Contributors to Fall Injury Risk  Recent Flowsheet Documentation  Taken 8/4/2021 2000 by Christine Jimenes RN  Medication Review/Management: medications reviewed  Intervention: Promote Injury-Free Environment  Recent Flowsheet Documentation  Taken 8/5/2021 0400 by Christine Jimenes RN  Safety Promotion/Fall Prevention:   activity supervised   assistive device/personal items within reach   clutter free environment maintained   safety round/check completed  Taken 8/5/2021 0200 by Christine Jimenes RN  Safety Promotion/Fall Prevention:   activity supervised   assistive device/personal items within reach   clutter free environment maintained   safety round/check completed  Taken 8/5/2021 0000 by Christine Jimenes RN  Safety Promotion/Fall Prevention:   activity supervised   assistive device/personal items within reach   clutter free environment maintained   safety round/check completed  Taken 8/4/2021 2200 by Christine Jimenes RN  Safety Promotion/Fall Prevention:   activity supervised   assistive device/personal items within reach   clutter free environment maintained   safety round/check completed  Taken 8/4/2021 2000 by Christine Jimenes RN  Safety Promotion/Fall Prevention:   assistive device/personal items within reach   activity supervised   clutter free environment maintained   safety round/check completed     Problem: Adult Inpatient Plan of Care  Goal: Plan of Care Review  Outcome: Ongoing, Progressing  Goal: Patient-Specific Goal (Individualized)  Outcome: Ongoing, Progressing  Goal: Absence of Hospital-Acquired Illness or Injury  Outcome: Ongoing, Progressing  Intervention: Identify and Manage Fall Risk  Recent Flowsheet  Documentation  Taken 8/5/2021 0400 by Christine Jimenes RN  Safety Promotion/Fall Prevention:   activity supervised   assistive device/personal items within reach   clutter free environment maintained   safety round/check completed  Taken 8/5/2021 0200 by Christine Jimenes RN  Safety Promotion/Fall Prevention:   activity supervised   assistive device/personal items within reach   clutter free environment maintained   safety round/check completed  Taken 8/5/2021 0000 by Christine Jimenes RN  Safety Promotion/Fall Prevention:   activity supervised   assistive device/personal items within reach   clutter free environment maintained   safety round/check completed  Taken 8/4/2021 2200 by Christine Jimenes RN  Safety Promotion/Fall Prevention:   activity supervised   assistive device/personal items within reach   clutter free environment maintained   safety round/check completed  Taken 8/4/2021 2000 by Christine Jimenes RN  Safety Promotion/Fall Prevention:   assistive device/personal items within reach   activity supervised   clutter free environment maintained   safety round/check completed  Goal: Optimal Comfort and Wellbeing  Outcome: Ongoing, Progressing  Intervention: Provide Person-Centered Care  Recent Flowsheet Documentation  Taken 8/4/2021 2000 by Christine Jimenes RN  Trust Relationship/Rapport:   care explained   choices provided   emotional support provided  Goal: Readiness for Transition of Care  Outcome: Ongoing, Progressing     Problem: Skin Injury Risk Increased  Goal: Skin Health and Integrity  Outcome: Ongoing, Progressing   Goal Outcome Evaluation:

## 2021-08-05 NOTE — PROGRESS NOTES
"RENAL/KCC:     LOS: 1 day    Patient Care Team:  Samantha Zabala APRN as PCP - General  Samantha Zabala APRN as PCP - Family Medicine  Jose G Rm MD as Consulting Physician (Cardiology)    Chief Complaint:  Palpitations    Subjective     Interval History:   Feels well.  On 2L NC.  Weight down 1 kg.  HR stable.    Objective     Vital Sign Min/Max for last 24 hours  Temp  Min: 97.4 °F (36.3 °C)  Max: 98.6 °F (37 °C)   BP  Min: 107/54  Max: 154/57   Pulse  Min: 63  Max: 93   Resp  Min: 17  Max: 20   SpO2  Min: 92 %  Max: 100 %   Flow (L/min)  Min: 2  Max: 7   Weight  Min: 115 kg (253 lb 4.9 oz)  Max: 115 kg (253 lb 4.9 oz)     Flowsheet Rows      First Filed Value   Admission Height  180.3 cm (71\") Documented at 08/03/2021 1733   Admission Weight  113 kg (250 lb) Documented at 08/03/2021 1733          I/O this shift:  In: 300 [P.O.:300]  Out: 625 [Urine:625]  I/O last 3 completed shifts:  In: -   Out: 2075 [Urine:2075]    Physical Exam:  GEN: Awake, NAD  ENT: PERRL, EOMI, MMM  NECK: Supple, no JVD  CHEST: CTAB, no W/R/C  CV: RRR, no M/G/R  ABD: Soft, NT, +BS  SKIN: Warm and Dry  NEURO: CN's intact      WBC WBC   Date Value Ref Range Status   08/05/2021 6.40 3.40 - 10.80 10*3/mm3 Final   08/04/2021 7.40 3.40 - 10.80 10*3/mm3 Final   08/03/2021 7.40 3.40 - 10.80 10*3/mm3 Final      HGB Hemoglobin   Date Value Ref Range Status   08/05/2021 7.0 (L) 13.0 - 17.7 g/dL Final   08/04/2021 7.1 (L) 13.0 - 17.7 g/dL Final   08/03/2021 8.0 (L) 13.0 - 17.7 g/dL Final      HCT Hematocrit   Date Value Ref Range Status   08/05/2021 21.0 (L) 37.5 - 51.0 % Final   08/04/2021 21.4 (L) 37.5 - 51.0 % Final   08/03/2021 24.5 (L) 37.5 - 51.0 % Final      Platlets No results found for: LABPLAT   MCV MCV   Date Value Ref Range Status   08/05/2021 86.0 79.0 - 97.0 fL Final   08/04/2021 86.2 79.0 - 97.0 fL Final   08/03/2021 86.6 79.0 - 97.0 fL Final          Sodium Sodium   Date Value Ref Range Status   08/05/2021 141 136 - 145 mmol/L Final "   08/04/2021 140 136 - 145 mmol/L Final   08/03/2021 139 136 - 145 mmol/L Final      Potassium Potassium   Date Value Ref Range Status   08/05/2021 4.6 3.5 - 5.2 mmol/L Final   08/04/2021 4.6 3.5 - 5.2 mmol/L Final   08/03/2021 4.5 3.5 - 5.2 mmol/L Final      Chloride Chloride   Date Value Ref Range Status   08/05/2021 98 98 - 107 mmol/L Final   08/04/2021 97 (L) 98 - 107 mmol/L Final   08/03/2021 95 (L) 98 - 107 mmol/L Final      CO2 CO2   Date Value Ref Range Status   08/05/2021 31.0 (H) 22.0 - 29.0 mmol/L Final   08/04/2021 33.0 (H) 22.0 - 29.0 mmol/L Final   08/03/2021 32.0 (H) 22.0 - 29.0 mmol/L Final      BUN BUN   Date Value Ref Range Status   08/05/2021 68 (H) 8 - 23 mg/dL Final   08/04/2021 65 (H) 8 - 23 mg/dL Final   08/03/2021 68 (H) 8 - 23 mg/dL Final      Creatinine Creatinine   Date Value Ref Range Status   08/05/2021 2.04 (H) 0.76 - 1.27 mg/dL Final   08/04/2021 1.95 (H) 0.76 - 1.27 mg/dL Final   08/03/2021 1.91 (H) 0.76 - 1.27 mg/dL Final      Calcium Calcium   Date Value Ref Range Status   08/05/2021 9.0 8.6 - 10.5 mg/dL Final   08/04/2021 9.0 8.6 - 10.5 mg/dL Final   08/03/2021 9.4 8.6 - 10.5 mg/dL Final      PO4 No results found for: CAPO4   Albumin No results found for: ALBUMIN   Magnesium Magnesium   Date Value Ref Range Status   08/05/2021 2.1 1.6 - 2.4 mg/dL Final      Uric Acid No results found for: URICACID        Results Review:     I reviewed the patient's new clinical results.    amiodarone, 400 mg, Oral, Q12H  aspirin, 81 mg, Oral, Daily  atorvastatin, 40 mg, Oral, Nightly  carvedilol, 6.25 mg, Oral, BID With Meals  cholecalciferol, 1,000 Units, Oral, Daily  dilTIAZem, 10 mg, Intravenous, Once  dilTIAZem CD, 240 mg, Oral, Q24H  donepezil, 10 mg, Oral, Nightly  DULoxetine, 60 mg, Oral, Daily  ferric gluconate, 250 mg, Intravenous, Daily  furosemide, 80 mg, Intravenous, Q8H  gabapentin, 200 mg, Oral, Q12H  hydrALAZINE, 50 mg, Oral, Q12H  insulin lispro, 0-9 Units, Subcutaneous, TID  AC  insulin NPH-insulin regular, 30 Units, Subcutaneous, BID With Meals  ipratropium-albuterol, 3 mL, Nebulization, 4x Daily - RT  isosorbide mononitrate, 60 mg, Oral, BID - Nitrates  sodium chloride, 10 mL, Intravenous, Q12H  spironolactone, 25 mg, Oral, Daily  vitamin B-12, 1,000 mcg, Oral, Daily           Medication Review: Reviewed    Assessment/Plan       Atrial fibrillation with RVR (CMS/[facility])    S/P CABG (coronary artery bypass graft)    Essential hypertension    Elevated troponin    ANNETTE treated with BiPAP    Mixed hyperlipidemia    Flaccid hemiplegia of right dominant side as late effect of cerebral infarction (CMS/[facility])    Diabetic neuropathy (CMS/[facility])    Unspecified dementia with behavioral disturbance (CMS/[facility])    Coronary artery disease    Chronic venous hypertension (idiopathic) with ulcer and inflammation of bilateral lower extremity (CMS/[facility])    Chronic obstructive pulmonary disease, unspecified (CMS/[facility])    Chronic foot ulcer (CMS/[facility])    CKD (chronic kidney disease) stage 3, GFR 30-59 ml/min (CMS/[facility])    Tobacco use disorder    Acute on chronic systolic heart failure (CMS/[facility])    Anemia of renal disease      1. DILEEP  2. CKD III  3. Pulmonary edema  4. Tachycardia  5. CHF  6. T2DM  7. Hypertension     PLAN:  Cr around 2 = likely new baseline.  Lytes OK.  Change to PO Lasix.  Continue Aldactone.  Rate control per Cardiology.  Will follow.       Yony Bhat M.D.  [facility]

## 2021-08-06 LAB
ANION GAP SERPL CALCULATED.3IONS-SCNC: 9 MMOL/L (ref 5–15)
BASOPHILS # BLD AUTO: 0.1 10*3/MM3 (ref 0–0.2)
BASOPHILS NFR BLD AUTO: 1.3 % (ref 0–1.5)
BUN SERPL-MCNC: 66 MG/DL (ref 8–23)
BUN/CREAT SERPL: 30.1 (ref 7–25)
CALCIUM SPEC-SCNC: 8.9 MG/DL (ref 8.6–10.5)
CHLORIDE SERPL-SCNC: 96 MMOL/L (ref 98–107)
CO2 SERPL-SCNC: 34 MMOL/L (ref 22–29)
CREAT SERPL-MCNC: 2.19 MG/DL (ref 0.76–1.27)
DEPRECATED RDW RBC AUTO: 47.3 FL (ref 37–54)
EOSINOPHIL # BLD AUTO: 0.5 10*3/MM3 (ref 0–0.4)
EOSINOPHIL NFR BLD AUTO: 9 % (ref 0.3–6.2)
ERYTHROCYTE [DISTWIDTH] IN BLOOD BY AUTOMATED COUNT: 15.5 % (ref 12.3–15.4)
GFR SERPL CREATININE-BSD FRML MDRD: 30 ML/MIN/1.73
GLUCOSE BLDC GLUCOMTR-MCNC: 133 MG/DL (ref 70–105)
GLUCOSE BLDC GLUCOMTR-MCNC: 148 MG/DL (ref 70–105)
GLUCOSE BLDC GLUCOMTR-MCNC: 154 MG/DL (ref 70–105)
GLUCOSE BLDC GLUCOMTR-MCNC: 163 MG/DL (ref 70–105)
GLUCOSE BLDC GLUCOMTR-MCNC: 73 MG/DL (ref 70–105)
GLUCOSE SERPL-MCNC: 66 MG/DL (ref 65–99)
HCT VFR BLD AUTO: 24 % (ref 37.5–51)
HGB BLD-MCNC: 8.1 G/DL (ref 13–17.7)
LYMPHOCYTES # BLD AUTO: 1.5 10*3/MM3 (ref 0.7–3.1)
LYMPHOCYTES NFR BLD AUTO: 26 % (ref 19.6–45.3)
MCH RBC QN AUTO: 29.6 PG (ref 26.6–33)
MCHC RBC AUTO-ENTMCNC: 33.9 G/DL (ref 31.5–35.7)
MCV RBC AUTO: 87.4 FL (ref 79–97)
MONOCYTES # BLD AUTO: 0.9 10*3/MM3 (ref 0.1–0.9)
MONOCYTES NFR BLD AUTO: 16 % (ref 5–12)
NEUTROPHILS NFR BLD AUTO: 2.7 10*3/MM3 (ref 1.7–7)
NEUTROPHILS NFR BLD AUTO: 47.7 % (ref 42.7–76)
NRBC BLD AUTO-RTO: 0.1 /100 WBC (ref 0–0.2)
PLATELET # BLD AUTO: 307 10*3/MM3 (ref 140–450)
PMV BLD AUTO: 8.1 FL (ref 6–12)
POTASSIUM SERPL-SCNC: 4.4 MMOL/L (ref 3.5–5.2)
RBC # BLD AUTO: 2.74 10*6/MM3 (ref 4.14–5.8)
SODIUM SERPL-SCNC: 139 MMOL/L (ref 136–145)
WBC # BLD AUTO: 5.7 10*3/MM3 (ref 3.4–10.8)
WHOLE BLOOD HOLD SPECIMEN: NORMAL

## 2021-08-06 PROCEDURE — 94799 UNLISTED PULMONARY SVC/PX: CPT

## 2021-08-06 PROCEDURE — 80048 BASIC METABOLIC PNL TOTAL CA: CPT | Performed by: INTERNAL MEDICINE

## 2021-08-06 PROCEDURE — 99233 SBSQ HOSP IP/OBS HIGH 50: CPT | Performed by: INTERNAL MEDICINE

## 2021-08-06 PROCEDURE — 25010000002 NA FERRIC GLUC CPLX PER 12.5 MG: Performed by: INTERNAL MEDICINE

## 2021-08-06 PROCEDURE — P9016 RBC LEUKOCYTES REDUCED: HCPCS

## 2021-08-06 PROCEDURE — 85025 COMPLETE CBC W/AUTO DIFF WBC: CPT | Performed by: INTERNAL MEDICINE

## 2021-08-06 PROCEDURE — 86900 BLOOD TYPING SEROLOGIC ABO: CPT

## 2021-08-06 PROCEDURE — 63710000001 INSULIN ISOPHANE & REGULAR PER 5 UNITS: Performed by: INTERNAL MEDICINE

## 2021-08-06 PROCEDURE — 82962 GLUCOSE BLOOD TEST: CPT

## 2021-08-06 PROCEDURE — 36430 TRANSFUSION BLD/BLD COMPNT: CPT

## 2021-08-06 RX ORDER — CARVEDILOL 6.25 MG/1
6.25 TABLET ORAL 2 TIMES DAILY WITH MEALS
Qty: 60 TABLET | Refills: 0 | Status: SHIPPED | OUTPATIENT
Start: 2021-08-06 | End: 2021-08-25 | Stop reason: HOSPADM

## 2021-08-06 RX ORDER — AMIODARONE HYDROCHLORIDE 200 MG/1
200 TABLET ORAL EVERY 12 HOURS SCHEDULED
Status: DISCONTINUED | OUTPATIENT
Start: 2021-08-06 | End: 2021-08-24

## 2021-08-06 RX ORDER — AMIODARONE HYDROCHLORIDE 200 MG/1
200 TABLET ORAL EVERY 12 HOURS SCHEDULED
Qty: 60 TABLET | Refills: 0 | Status: SHIPPED | OUTPATIENT
Start: 2021-08-06 | End: 2021-08-25 | Stop reason: HOSPADM

## 2021-08-06 RX ORDER — FUROSEMIDE 80 MG
80 TABLET ORAL 2 TIMES DAILY
Qty: 60 TABLET | Refills: 0 | Status: SHIPPED | OUTPATIENT
Start: 2021-08-06 | End: 2021-08-25 | Stop reason: HOSPADM

## 2021-08-06 RX ORDER — DILTIAZEM HYDROCHLORIDE 240 MG/1
240 CAPSULE, COATED, EXTENDED RELEASE ORAL
Qty: 30 CAPSULE | Refills: 0 | Status: ON HOLD | OUTPATIENT
Start: 2021-08-07 | End: 2022-01-01

## 2021-08-06 RX ADMIN — ASPIRIN 81 MG: 81 TABLET, CHEWABLE ORAL at 08:48

## 2021-08-06 RX ADMIN — CARVEDILOL 6.25 MG: 6.25 TABLET, FILM COATED ORAL at 18:20

## 2021-08-06 RX ADMIN — CYANOCOBALAMIN TAB 1000 MCG 1000 MCG: 1000 TAB at 08:49

## 2021-08-06 RX ADMIN — GABAPENTIN 200 MG: 100 CAPSULE ORAL at 08:49

## 2021-08-06 RX ADMIN — ISOSORBIDE MONONITRATE 60 MG: 60 TABLET, EXTENDED RELEASE ORAL at 18:20

## 2021-08-06 RX ADMIN — IPRATROPIUM BROMIDE AND ALBUTEROL SULFATE 3 ML: 2.5; .5 SOLUTION RESPIRATORY (INHALATION) at 07:41

## 2021-08-06 RX ADMIN — ISOSORBIDE MONONITRATE 60 MG: 60 TABLET, EXTENDED RELEASE ORAL at 08:49

## 2021-08-06 RX ADMIN — HYDRALAZINE HYDROCHLORIDE 50 MG: 25 TABLET, FILM COATED ORAL at 21:25

## 2021-08-06 RX ADMIN — FUROSEMIDE 80 MG: 40 TABLET ORAL at 18:20

## 2021-08-06 RX ADMIN — DILTIAZEM HYDROCHLORIDE 240 MG: 240 CAPSULE, COATED, EXTENDED RELEASE ORAL at 12:28

## 2021-08-06 RX ADMIN — SODIUM CHLORIDE 250 MG: 9 INJECTION, SOLUTION INTRAVENOUS at 08:53

## 2021-08-06 RX ADMIN — INSULIN HUMAN 30 UNITS: 100 INJECTION, SUSPENSION SUBCUTANEOUS at 18:20

## 2021-08-06 RX ADMIN — Medication 1000 UNITS: at 08:48

## 2021-08-06 RX ADMIN — ATORVASTATIN CALCIUM 40 MG: 40 TABLET, FILM COATED ORAL at 21:25

## 2021-08-06 RX ADMIN — GABAPENTIN 200 MG: 100 CAPSULE ORAL at 21:24

## 2021-08-06 RX ADMIN — APIXABAN 5 MG: 5 TABLET, FILM COATED ORAL at 21:25

## 2021-08-06 RX ADMIN — IPRATROPIUM BROMIDE AND ALBUTEROL SULFATE 3 ML: 2.5; .5 SOLUTION RESPIRATORY (INHALATION) at 21:32

## 2021-08-06 RX ADMIN — INSULIN HUMAN 30 UNITS: 100 INJECTION, SUSPENSION SUBCUTANEOUS at 08:47

## 2021-08-06 RX ADMIN — DULOXETINE 60 MG: 30 CAPSULE, DELAYED RELEASE ORAL at 08:49

## 2021-08-06 RX ADMIN — Medication 10 ML: at 21:25

## 2021-08-06 RX ADMIN — SPIRONOLACTONE 25 MG: 25 TABLET ORAL at 08:49

## 2021-08-06 RX ADMIN — FUROSEMIDE 80 MG: 40 TABLET ORAL at 08:48

## 2021-08-06 RX ADMIN — AMIODARONE HYDROCHLORIDE 200 MG: 200 TABLET ORAL at 21:25

## 2021-08-06 RX ADMIN — Medication 10 ML: at 08:49

## 2021-08-06 RX ADMIN — HYDRALAZINE HYDROCHLORIDE 50 MG: 25 TABLET, FILM COATED ORAL at 08:49

## 2021-08-06 RX ADMIN — APIXABAN 5 MG: 5 TABLET, FILM COATED ORAL at 12:28

## 2021-08-06 RX ADMIN — DONEPEZIL HYDROCHLORIDE 10 MG: 5 TABLET, FILM COATED ORAL at 21:24

## 2021-08-06 RX ADMIN — AMIODARONE HYDROCHLORIDE 400 MG: 200 TABLET ORAL at 08:49

## 2021-08-06 RX ADMIN — CARVEDILOL 6.25 MG: 6.25 TABLET, FILM COATED ORAL at 08:48

## 2021-08-06 RX ADMIN — IPRATROPIUM BROMIDE AND ALBUTEROL SULFATE 3 ML: 2.5; .5 SOLUTION RESPIRATORY (INHALATION) at 15:06

## 2021-08-06 NOTE — PROGRESS NOTES
Referring Provider: Stanton Junior MD    Reason for follow-up:  Atrial fibrillation  Cardiomyopathy  Shortness of breath     Patient Care Team:  Samantha Zabala APRN as PCP - General  Samantha Zabala APRN as PCP - Family Medicine  Jose G Rm MD as Consulting Physician (Cardiology)    Subjective .  Feeling better     ROS    Since I have last seen him yesterday, the patient has been without any chest discomfort ,shortness of breath, palpitations, dizziness or syncope.  Denies having any headache ,abdominal pain ,nausea, vomiting , diarrhea constipation, loss of weight or loss of appetite.  Denies having any excessive bruising ,hematuria or blood in the stool.    Review of all systems negative except as indicated    History  Past Medical History:   Diagnosis Date   • Appetite loss    • Brain bleed (CMS/HCC)    • Carpal tunnel syndrome on left     carpal tunnel on left hand   • CHF (congestive heart failure) (CMS/HCC)    • Diabetic neuropathy (CMS/HCC)    • DM2 (diabetes mellitus, type 2) (CMS/HCC)    • History of angina    • Hyperlipidemia    • Hypogonadism in male    • Obesity    • Sleep apnea    • Stroke (CMS/HCC)    • Unsteady gait        Past Surgical History:   Procedure Laterality Date   • ANGIOPLASTY      X2   • APPENDECTOMY     • CARDIAC CATHETERIZATION  11/13/2015   • CARDIAC CATHETERIZATION N/A 5/24/2021    Procedure: Left Heart Cath and coronary angiogram;  Surgeon: Jose G Rm MD;  Location: [facility] CATH INVASIVE LOCATION;  Service: Cardiovascular;  Laterality: N/A;   • CARDIAC CATHETERIZATION  5/24/2021    Procedure: Saphenous Vein Graft;  Surgeon: Jose G Rm MD;  Location: [facility] CATH INVASIVE LOCATION;  Service: Cardiovascular;;   • CARDIAC CATHETERIZATION N/A 5/24/2021    Procedure: Percutaneous Coronary Intervention;  Surgeon: Bernabe Zambrano MD;  Location: [facility] CATH INVASIVE LOCATION;  Service: Cardiology;  Laterality: N/A;   • CARDIAC CATHETERIZATION N/A 5/24/2021    Procedure: Stent  NIELS coronary;  Surgeon: Bernabe Zambrano MD;  Location:  [facility] CATH INVASIVE LOCATION;  Service: Cardiology;  Laterality: N/A;   • CARDIAC CATHETERIZATION N/A 5/26/2021    Procedure: Percutaneous Coronary Intervention;  Surgeon: Bernabe Zambrano MD;  Location:  [facility] CATH INVASIVE LOCATION;  Service: Cardiovascular;  Laterality: N/A;   • CARDIAC CATHETERIZATION N/A 5/26/2021    Procedure: Stent NIELS bypass graft;  Surgeon: Bernabe Zambrano MD;  Location: [facility] CATH INVASIVE LOCATION;  Service: Cardiovascular;  Laterality: N/A;   • CARDIAC ELECTROPHYSIOLOGY PROCEDURE N/A 5/24/2021    Procedure: Temporary Pacemaker;  Surgeon: Bernabe Zambrano MD;  Location:  [facility] CATH INVASIVE LOCATION;  Service: Cardiology;  Laterality: N/A;   • CARDIAC ELECTROPHYSIOLOGY PROCEDURE N/A 5/26/2021    Procedure: Temporary Pacemaker;  Surgeon: Bernabe Zambrano MD;  Location: [facility] CATH INVASIVE LOCATION;  Service: Cardiovascular;  Laterality: N/A;   • CARPAL TUNNEL RELEASE Left 04/29/2018    carpal tunnel- lt hand// other hand surgeries    • CATARACT EXTRACTION, BILATERAL  2002    Dr. Lux Acosta   • CHOLECYSTECTOMY     • COLON RESECTION  2005   • CORONARY ANGIOPLASTY     • CORONARY ANGIOPLASTY WITH STENT PLACEMENT  11/13/2015    PTCA stent to proximal in stent and mid to distal lad   • CORONARY ANGIOPLASTY WITH STENT PLACEMENT  09/16/2016    PTCA stent to mid lad and stent to vein graft to marginal   • CORONARY ARTERY BYPASS GRAFT  2005    @ [facility]   • CYST REMOVAL      cyst removed from scrotum   • FOOT SURGERY Right 07/17/2018   • FOOT SURGERY Left 02/04/2019   • TOE AMPUTATION Right     right toe removed D/T infected cut that went to the bone       Family History   Problem Relation Age of Onset   • Heart disease Mother    • Heart disease Father    • Diabetes Sister    • Heart disease Sister    • Diabetes Brother    • Mental illness Brother        Social History     Tobacco Use   • Smoking status: Former Smoker     Packs/day: 1.00     Years: 60.00      Pack years: 60.00     Types: Cigarettes   • Smokeless tobacco: Never Used   • Tobacco comment: Patient quit smoking 11/2020   Vaping Use   • Vaping Use: Never used   Substance Use Topics   • Alcohol use: No   • Drug use: Yes     Frequency: 1.0 times per week     Types: Marijuana     Comment: socially        Medications Prior to Admission   Medication Sig Dispense Refill Last Dose   • albuterol sulfate  (90 Base) MCG/ACT inhaler Inhale 2 puffs Every 4 (Four) Hours As Needed.      • apixaban (ELIQUIS) 5 MG tablet tablet Take 5 mg by mouth Daily.      • aspirin 81 MG chewable tablet Chew 81 mg Daily.      • atorvastatin (LIPITOR) 40 MG tablet Take 40 mg by mouth Every Night.      • cholecalciferol (VITAMIN D3) 25 MCG (1000 UT) tablet Take 1,000 Units by mouth Daily.      • dextrose (GLUTOSE) 40 % gel Take  by mouth Every 1 (One) Hour As Needed for Low Blood Sugar.      • donepezil (ARICEPT) 10 MG tablet Take 10 mg by mouth Every Night.      • DULoxetine HCl (DRIZALMA) 60 MG capsule Take 60 mg by mouth Daily.      • furosemide (LASIX) 40 MG tablet Take 1 tablet by mouth 2 (Two) Times a Day for 30 days. 60 tablet 0    • gabapentin (NEURONTIN) 100 MG capsule Take 2 capsules by mouth 2 (two) times a day. 6 capsule 0    • hydrALAZINE (APRESOLINE) 50 MG tablet Take 50 mg by mouth 2 (two) times a day. Hold for SBP <110      • isosorbide mononitrate (IMDUR) 60 MG 24 hr tablet Take 60 mg by mouth 2 (Two) Times a Day.      • metoprolol succinate XL (TOPROL-XL) 100 MG 24 hr tablet Take 1 tablet by mouth Daily for 30 days. 30 tablet 0    • nitroglycerin (NITROSTAT) 0.4 MG SL tablet Place 1 tablet under the tongue Every 5 (Five) Minutes As Needed for Chest Pain (Only if SBP Greater Than 100). Take no more than 3 doses in 15 minutes. 30 tablet 12    • spironolactone (ALDACTONE) 25 MG tablet Take 1 tablet by mouth Daily for 30 days. 30 tablet 0    • tiotropium (Spiriva HandiHaler) 18 MCG per inhalation capsule Place 1  "capsule into inhaler and inhale Daily. 30 capsule 1    • vitamin B-12 (CYANOCOBALAMIN) 1000 MCG tablet Take 1,000 mcg by mouth Daily.          Allergies  Codeine    Scheduled Meds:amiodarone, 400 mg, Oral, Q12H  aspirin, 81 mg, Oral, Daily  atorvastatin, 40 mg, Oral, Nightly  carvedilol, 6.25 mg, Oral, BID With Meals  cholecalciferol, 1,000 Units, Oral, Daily  dilTIAZem, 10 mg, Intravenous, Once  dilTIAZem CD, 240 mg, Oral, Q24H  donepezil, 10 mg, Oral, Nightly  DULoxetine, 60 mg, Oral, Daily  ferric gluconate, 250 mg, Intravenous, Daily  furosemide, 80 mg, Oral, BID  gabapentin, 200 mg, Oral, Q12H  hydrALAZINE, 50 mg, Oral, Q12H  insulin lispro, 0-9 Units, Subcutaneous, TID AC  insulin NPH-insulin regular, 30 Units, Subcutaneous, BID With Meals  ipratropium-albuterol, 3 mL, Nebulization, 4x Daily - RT  isosorbide mononitrate, 60 mg, Oral, BID - Nitrates  sodium chloride, 10 mL, Intravenous, Q12H  spironolactone, 25 mg, Oral, Daily  vitamin B-12, 1,000 mcg, Oral, Daily      Continuous Infusions:   PRN Meds:.•  acetaminophen **OR** acetaminophen **OR** acetaminophen  •  aluminum-magnesium hydroxide-simethicone  •  dextrose  •  dextrose  •  glucagon (human recombinant)  •  insulin lispro **AND** insulin lispro  •  melatonin  •  nitroglycerin  •  ondansetron **OR** ondansetron  •  [COMPLETED] Insert peripheral IV **AND** sodium chloride  •  sodium chloride    Objective     VITAL SIGNS  Vitals:    08/05/21 2230 08/06/21 0233 08/06/21 0315 08/06/21 0555   BP: 116/59 110/48 122/57 136/65   Pulse: 65 65 68 81   Resp: 20 16 18 20   Temp: 97.9 °F (36.6 °C) 97.8 °F (36.6 °C) 98 °F (36.7 °C) 97.8 °F (36.6 °C)   TempSrc: Oral Oral     SpO2: 99% 99% 100% 100%   Weight:       Height:           Flowsheet Rows      First Filed Value   Admission Height  180.3 cm (71\") Documented at 08/03/2021 1733   Admission Weight  113 kg (250 lb) Documented at 08/03/2021 1733            Intake/Output Summary (Last 24 hours) at 8/6/2021 0649  Last " data filed at 8/6/2021 0633  Gross per 24 hour   Intake 950 ml   Output 2600 ml   Net -1650 ml        TELEMETRY: Atrial fibrillation with controlled ventricular response.    Physical Exam:  The patient is alert, oriented and in no distress.  Vital signs as noted above.  Exogenous obesity.  Head and neck revealed no carotid bruits or jugular venous distention.  No thyromegaly or lymphadenopathy is present  Lungs clear.  No wheezing.  Breath sounds are normal bilaterally.  Heart normal first and second heart sounds.  No murmur. No precordial rub is present.  No gallop is present.  Abdomen soft and nontender.  No organomegaly is present.  Extremities with good peripheral pulses without any pedal edema.  Skin warm and dry.  Musculoskeletal system is grossly normal  CNS grossly normal      Results Review:   I reviewed the patient's new clinical results.  Lab Results (last 24 hours)     Procedure Component Value Units Date/Time    Extra Tubes [[id number]] Collected: 08/06/21 0625    Specimen: Blood, Venous Line Updated: 08/06/21 0631    Narrative:      The following orders were created for panel order Extra Tubes.  Procedure                               Abnormality         Status                     ---------                               -----------         ------                     Lavender Top[[id number]]                                     In process                   Please view results for these tests on the individual orders.    Lavender Top [[id number]] Collected: 08/06/21 0625    Specimen: Blood Updated: 08/06/21 0631    Basic Metabolic Panel [[id number]] Collected: 08/06/21 0625    Specimen: Blood Updated: 08/06/21 0629    POC Glucose Once [[id number]]  (Abnormal) Collected: 08/05/21 2033    Specimen: Blood Updated: 08/05/21 2034     Glucose 145 mg/dL      Comment: Serial Number: [id number]:  [id number]       POC Glucose Once [[id number]]  (Abnormal) Collected: 08/05/21 1643    Specimen: Blood Updated:  08/05/21 1644     Glucose 153 mg/dL      Comment: Serial Number: [id number]:  [id number]       POC Glucose Once [[id number]]  (Abnormal) Collected: 08/05/21 1148    Specimen: Blood Updated: 08/05/21 1150     Glucose 150 mg/dL      Comment: Serial Number: [id number]:  [id number]             Imaging Results (Last 24 Hours)     ** No results found for the last 24 hours. **      LAB RESULTS (LAST 7 DAYS)    CBC  Results from last 7 days   Lab Units 08/05/21 0300 08/04/21 0519 08/03/21 1837 07/31/21  0238   WBC 10*3/mm3 6.40 7.40 7.40 6.00   RBC 10*6/mm3 2.45* 2.48* 2.82* 2.76*   HEMOGLOBIN g/dL 7.0* 7.1* 8.0* 7.7*   HEMATOCRIT % 21.0* 21.4* 24.5* 23.8*   MCV fL 86.0 86.2 86.6 86.3   PLATELETS 10*3/mm3 310 312 334 247       BMP  Results from last 7 days   Lab Units 08/05/21 0300 08/04/21 0519 08/03/21 1837 08/02/21 0244 08/01/21 0305 07/31/21  0238   SODIUM mmol/L 141 140 139 137 141 139   POTASSIUM mmol/L 4.6 4.6 4.5 4.6 4.7 4.6   CHLORIDE mmol/L 98 97* 95* 97* 100 100   CO2 mmol/L 31.0* 33.0* 32.0* 34.0* 33.0* 31.0*   BUN mg/dL 68* 65* 68* 64* 55* 51*   CREATININE mg/dL 2.04* 1.95* 1.91* 1.95* 1.99* 1.86*   GLUCOSE mg/dL 71 171* 198* 92 64* 99   MAGNESIUM mg/dL 2.1  --   --   --   --   --    PHOSPHORUS mg/dL 5.1*  --   --   --   --   --        CMP   Results from last 7 days   Lab Units 08/05/21 0300 08/04/21 0519 08/03/21 1837 08/02/21 0244 08/01/21 0305 07/31/21  0238   SODIUM mmol/L 141 140 139 137 141 139   POTASSIUM mmol/L 4.6 4.6 4.5 4.6 4.7 4.6   CHLORIDE mmol/L 98 97* 95* 97* 100 100   CO2 mmol/L 31.0* 33.0* 32.0* 34.0* 33.0* 31.0*   BUN mg/dL 68* 65* 68* 64* 55* 51*   CREATININE mg/dL 2.04* 1.95* 1.91* 1.95* 1.99* 1.86*   GLUCOSE mg/dL 71 171* 198* 92 64* 99         BNP        TROPONIN  Results from last 7 days   Lab Units 08/04/21  0519   TROPONIN T ng/mL 0.792*       CoAg        Creatinine Clearance  Estimated Creatinine Clearance: 40.8 mL/min (A) (by C-G formula based on SCr of 2.04  mg/dL (H)).    ABG        Radiology  No radiology results for the last day            EKG              I personally viewed and interpreted the patient's EKG/Telemetry data:    ECHOCARDIOGRAM:    Results for orders placed during the hospital encounter of 07/20/21    Adult Transthoracic Echo Complete With Contrast if Necessary Per Protocol    Interpretation Summary  · Estimated left ventricular EF = 30% Left ventricular systolic function is moderately decreased.    Occasions  Shortness of breath.    Technically satisfactory study.  Mitral valve is structurally normal.  Mild mitral regurgitation.  Tricuspid valve is structurally normal.  Aortic valve is structurally normal.  Pulmonic valve could not be well visualized.  No evidence for tricuspid or aortic regurgitation is seen by Doppler study.  Biatrial and biventricular enlargement is present.  Diffuse left ventricular hypocontractility with ejection fraction of 30%.  Atrial septum is intact.  Aorta is normal.  No pericardial effusion or intracardiac thrombus is seen.    Impression  Mild mitral regurgitation.  Significant biatrial and biventricular enlargement.  Diffuse left ventricular hypocontractility with ejection fraction of 30%.  No pericardial effusion or intracardiac thrombus is seen.          STRESS MYOVIEW:    Cardiolite (Tc-99m Sestamibi) stress test    CARDIAC CATHETERIZATION:            OTHER:         Assessment/Plan     Principal Problem:    Atrial fibrillation with RVR (CMS/HCC)  Active Problems:    S/P CABG (coronary artery bypass graft)    Essential hypertension    Elevated troponin    ANNETTE treated with BiPAP    Mixed hyperlipidemia    Flaccid hemiplegia of right dominant side as late effect of cerebral infarction (CMS/HCC)    Diabetic neuropathy (CMS/HCC)    Unspecified dementia with behavioral disturbance (CMS/HCC)    Coronary artery disease    Chronic venous hypertension (idiopathic) with ulcer and inflammation of bilateral lower extremity  (CMS/[facility])    Chronic obstructive pulmonary disease, unspecified (CMS/[facility])    Chronic foot ulcer (CMS/[facility])    CKD (chronic kidney disease) stage 3, GFR 30-59 ml/min (CMS/[facility])    Tobacco use disorder    Acute on chronic systolic heart failure (CMS/[facility])    Anemia of renal disease      [[[[[[[[[[[[[[[[[[[[[[[[[    Impression  ==============  -Increased shortness of breath and palpitations     -Recent acute on chronic congestive heart failure.  Compensated at this time  Recent echocardiogram showed left ventricle dysfunction with ejection fraction of 35%.     -History of right-sided weakness with left MCA territory stroke.-Improved      -status post CABG 2005. status post stent to LAD 2009    Status post stent to LAD 11/13/2015  Status post stent to mid LAD and SVG to marginal branch 09/16/2016.  Status post stent to mid LAD 5/24/2021  Status post stent to SVG to RCA 5/26/2021     Cardiac catheterization 5/24/2021 revealed  Left ventricle angiogram was not performed due to pre-existing renal dysfunction.  Left main coronary artery normal.  Left anterior descending artery has mid segment lengthy 90% disease within the previously placed stent.  Distal left into descending artery has diffuse disease.  Circumflex coronary artery is totally occluded.  (Chronic)  Right coronary artery is chronically occluded.  SVG to marginal branch (jump graft to OM1 and OM 2) is totally occluded (new finding compared to 2015.  SVG to PDA has distal radiolucency.  However no definite obstructive disease is present.       -status post myocardial infarction 2000 and 2002 .      -history of intermittent atrial fibrillation-maintaining sinus rhythm     Transesophageal echocardiogram 11/30/2020.  Biatrial enlargement.  Smoking effect in the left atrium and left ventricle and left atrial appendage.  Mild mitral and aortic regurgitation.  Left ventricle enlargement with diffuse hypocontractility with ejection fraction of 35 to  40%.     Echocardiogram 11/27/2020 revealed left atrial enlargement left ventricle dysfunction with ejection fraction of 40%.  Negative bubble study.      -diabetes hypertension and sleep apnea in history of renal dysfunction .  Recent BUN/creatinine 38/1.36     -status post colon surgery (partial colectomy) appendectomy cholecystectomy right 4th toe removal and carpal tunnel surgery      -continued smoking 1 pack per day -abstinence from smoking      -allergy to codeine.  ============   Plan  ================  Patient recently was discharged from the hospital.  Patient apparently was not receiving his medications appropriately.  Patient has atrial fibrillation with rapid ventricle response.  Patient is off intravenous Cardizem  Continue Coreg and p.o. Cardizem  Reduce amiodarone to 200 mg once a day.  Continue Coreg    Troponin level is slightly elevated  0.11  0.41  Trend troponin level     Significant renal dysfunction-chronic  BUN/68/1.91-8/4/2021.  BUN 64 creatinine 1.95-8/2/2021  68/2.04-8/5/2021     Anemia  hgb 7.1  7-8/5/2021  7 PRBC  Receiving iron transfusion.     Status post CABG  Patient is not having any angina pectoris  Status post stent to LAD 5/24/2021.  Status post stent to SVG to RCA 5/26/2021.     History of Spontaneous prolonged episode of asystole is of concern.  Patient had recovered without any intervention or CPR.  We will closely observe for any bradycardia arrhythmias and patient may need pacemaker/ICD implantation.  No further episodes.     Paroxysmal atrial fibrillation.  Patient is mostly in sinus rhythm.      Anticoagulation  Patient was on Eliquis.  Patient can go back on Eliquis from cardiac standpoint.  We will let primary care decide     Medications were reviewed and updated.  Current medications include aspirin amiodarone atorvastatin Coreg Cardizem CD Lasix 80 mg twice a day p.o. gabapentin hydralazine spironolactone 25 mg a day.    Okay with transfer plans to rehab from cardiac  standpoint.    Further plan will depend on patient's progress.   ]]]]]]]]]]]]]]]]]]]]]]           Jose G Rm MD  08/06/21  06:49 EDT

## 2021-08-06 NOTE — PROGRESS NOTES
"RENAL/KCC:     LOS: 2 days    Patient Care Team:  Samantha Zabala APRN as PCP - General  Samantha Zabala APRN as PCP - Family Medicine  Jose G Rm MD as Consulting Physician (Cardiology)    Chief Complaint:  Palpitations    Subjective     Interval History:   Feels well.  On 2L NC.  HR stable.  Good UOP.    Objective     Vital Sign Min/Max for last 24 hours  Temp  Min: 97.3 °F (36.3 °C)  Max: 98 °F (36.7 °C)   BP  Min: 107/54  Max: 136/65   Pulse  Min: 60  Max: 83   Resp  Min: 16  Max: 20   SpO2  Min: 96 %  Max: 100 %   Flow (L/min)  Min: 2  Max: 10   No data recorded     Flowsheet Rows      First Filed Value   Admission Height  180.3 cm (71\") Documented at 08/03/2021 1733   Admission Weight  113 kg (250 lb) Documented at 08/03/2021 1733          I/O this shift:  In: 240 [P.O.:240]  Out: 250 [Urine:250]  I/O last 3 completed shifts:  In: 950 [P.O.:600; Blood:350]  Out: 3075 [Urine:3075]    Physical Exam:  GEN: Awake, NAD  ENT: PERRL, EOMI, MMM  NECK: Supple, no JVD  CHEST: CTAB, no W/R/C  CV: RRR, no M/G/R  ABD: Soft, NT, +BS  SKIN: Warm and Dry  NEURO: CN's intact      WBC WBC   Date Value Ref Range Status   08/06/2021 5.70 3.40 - 10.80 10*3/mm3 Final   08/05/2021 6.40 3.40 - 10.80 10*3/mm3 Final   08/04/2021 7.40 3.40 - 10.80 10*3/mm3 Final   08/03/2021 7.40 3.40 - 10.80 10*3/mm3 Final      HGB Hemoglobin   Date Value Ref Range Status   08/06/2021 8.1 (L) 13.0 - 17.7 g/dL Final   08/05/2021 7.0 (L) 13.0 - 17.7 g/dL Final   08/04/2021 7.1 (L) 13.0 - 17.7 g/dL Final   08/03/2021 8.0 (L) 13.0 - 17.7 g/dL Final      HCT Hematocrit   Date Value Ref Range Status   08/06/2021 24.0 (L) 37.5 - 51.0 % Final   08/05/2021 21.0 (L) 37.5 - 51.0 % Final   08/04/2021 21.4 (L) 37.5 - 51.0 % Final   08/03/2021 24.5 (L) 37.5 - 51.0 % Final      Platlets No results found for: LABPLAT   MCV MCV   Date Value Ref Range Status   08/06/2021 87.4 79.0 - 97.0 fL Final   08/05/2021 86.0 79.0 - 97.0 fL Final   08/04/2021 86.2 79.0 - 97.0 " fL Final   08/03/2021 86.6 79.0 - 97.0 fL Final          Sodium Sodium   Date Value Ref Range Status   08/06/2021 139 136 - 145 mmol/L Final   08/05/2021 141 136 - 145 mmol/L Final   08/04/2021 140 136 - 145 mmol/L Final   08/03/2021 139 136 - 145 mmol/L Final      Potassium Potassium   Date Value Ref Range Status   08/06/2021 4.4 3.5 - 5.2 mmol/L Final   08/05/2021 4.6 3.5 - 5.2 mmol/L Final   08/04/2021 4.6 3.5 - 5.2 mmol/L Final   08/03/2021 4.5 3.5 - 5.2 mmol/L Final      Chloride Chloride   Date Value Ref Range Status   08/06/2021 96 (L) 98 - 107 mmol/L Final   08/05/2021 98 98 - 107 mmol/L Final   08/04/2021 97 (L) 98 - 107 mmol/L Final   08/03/2021 95 (L) 98 - 107 mmol/L Final      CO2 CO2   Date Value Ref Range Status   08/06/2021 34.0 (H) 22.0 - 29.0 mmol/L Final   08/05/2021 31.0 (H) 22.0 - 29.0 mmol/L Final   08/04/2021 33.0 (H) 22.0 - 29.0 mmol/L Final   08/03/2021 32.0 (H) 22.0 - 29.0 mmol/L Final      BUN BUN   Date Value Ref Range Status   08/06/2021 66 (H) 8 - 23 mg/dL Final   08/05/2021 68 (H) 8 - 23 mg/dL Final   08/04/2021 65 (H) 8 - 23 mg/dL Final   08/03/2021 68 (H) 8 - 23 mg/dL Final      Creatinine Creatinine   Date Value Ref Range Status   08/06/2021 2.19 (H) 0.76 - 1.27 mg/dL Final   08/05/2021 2.04 (H) 0.76 - 1.27 mg/dL Final   08/04/2021 1.95 (H) 0.76 - 1.27 mg/dL Final   08/03/2021 1.91 (H) 0.76 - 1.27 mg/dL Final      Calcium Calcium   Date Value Ref Range Status   08/06/2021 8.9 8.6 - 10.5 mg/dL Final   08/05/2021 9.0 8.6 - 10.5 mg/dL Final   08/04/2021 9.0 8.6 - 10.5 mg/dL Final   08/03/2021 9.4 8.6 - 10.5 mg/dL Final      PO4 No results found for: CAPO4   Albumin No results found for: ALBUMIN   Magnesium Magnesium   Date Value Ref Range Status   08/05/2021 2.1 1.6 - 2.4 mg/dL Final      Uric Acid No results found for: URICACID        Results Review:     I reviewed the patient's new clinical results.    amiodarone, 200 mg, Oral, Q12H  apixaban, 5 mg, Oral, Q12H  aspirin, 81 mg, Oral,  Daily  atorvastatin, 40 mg, Oral, Nightly  carvedilol, 6.25 mg, Oral, BID With Meals  cholecalciferol, 1,000 Units, Oral, Daily  dilTIAZem, 10 mg, Intravenous, Once  dilTIAZem CD, 240 mg, Oral, Q24H  donepezil, 10 mg, Oral, Nightly  DULoxetine, 60 mg, Oral, Daily  ferric gluconate, 250 mg, Intravenous, Daily  furosemide, 80 mg, Oral, BID  gabapentin, 200 mg, Oral, Q12H  hydrALAZINE, 50 mg, Oral, Q12H  insulin lispro, 0-9 Units, Subcutaneous, TID AC  insulin NPH-insulin regular, 30 Units, Subcutaneous, BID With Meals  ipratropium-albuterol, 3 mL, Nebulization, 4x Daily - RT  isosorbide mononitrate, 60 mg, Oral, BID - Nitrates  sodium chloride, 10 mL, Intravenous, Q12H  spironolactone, 25 mg, Oral, Daily  vitamin B-12, 1,000 mcg, Oral, Daily           Medication Review: Reviewed    Assessment/Plan       Atrial fibrillation with RVR (CMS/[facility])    S/P CABG (coronary artery bypass graft)    Essential hypertension    Elevated troponin    ANNETTE treated with BiPAP    Mixed hyperlipidemia    Flaccid hemiplegia of right dominant side as late effect of cerebral infarction (CMS/[facility])    Diabetic neuropathy (CMS/[facility])    Unspecified dementia with behavioral disturbance (CMS/[facility])    Coronary artery disease    Chronic venous hypertension (idiopathic) with ulcer and inflammation of bilateral lower extremity (CMS/HCC)    Chronic obstructive pulmonary disease, unspecified (CMS/HCC)    Chronic foot ulcer (CMS/HCC)    CKD (chronic kidney disease) stage 3, GFR 30-59 ml/min (CMS/[facility])    Tobacco use disorder    Acute on chronic systolic heart failure (CMS/HCC)    Anemia of renal disease      1. DILEEP  2. CKD III  3. Pulmonary edema  4. Tachycardia  5. CHF  6. T2DM  7. Hypertension     PLAN:  Cr around 2 = likely new baseline when diuresed.  Lytes OK.  Continue PO Lasix.  Continue Aldactone.  Rate control per Cardiology.  OK to rehab anytime from a renal standpoint with follow-up.  Will follow.       Yony Bhat M.D.  [facility]

## 2021-08-06 NOTE — PLAN OF CARE
Problem: Adult Inpatient Plan of Care  Goal: Plan of Care Review  Outcome: Ongoing, Progressing  Flowsheets (Taken 8/6/2021 1434)  Plan of Care Reviewed With: patient   Goal Outcome Evaluation:  Plan of Care Reviewed With: patient            Pt A&O. Continues on 2LNC. No c/o voiced at this time. Will cont to monitor.

## 2021-08-06 NOTE — PLAN OF CARE
Pt rested throughout the night with no distress and is receiving a unit of blood; will continue to monitor    Problem: Arrhythmia/Dysrhythmia  Goal: Normalized Cardiac Rhythm  Outcome: Ongoing, Progressing     Problem: Fall Injury Risk  Goal: Absence of Fall and Fall-Related Injury  Outcome: Ongoing, Progressing  Intervention: Identify and Manage Contributors to Fall Injury Risk  Recent Flowsheet Documentation  Taken 8/5/2021 2000 by Christine Jimenes RN  Medication Review/Management: medications reviewed  Intervention: Promote Injury-Free Environment  Recent Flowsheet Documentation  Taken 8/6/2021 0400 by Christine Jimenes RN  Safety Promotion/Fall Prevention:   activity supervised   assistive device/personal items within reach   clutter free environment maintained  Taken 8/6/2021 0200 by Christine Jimenes RN  Safety Promotion/Fall Prevention:   activity supervised   assistive device/personal items within reach   clutter free environment maintained   safety round/check completed  Taken 8/6/2021 0000 by Christine Jimenes RN  Safety Promotion/Fall Prevention:   activity supervised   assistive device/personal items within reach   clutter free environment maintained   safety round/check completed  Taken 8/5/2021 2200 by Christine Jimenes RN  Safety Promotion/Fall Prevention:   activity supervised   assistive device/personal items within reach   clutter free environment maintained   safety round/check completed  Taken 8/5/2021 2000 by Christine Jimenes RN  Safety Promotion/Fall Prevention:   assistive device/personal items within reach   activity supervised   clutter free environment maintained   safety round/check completed     Problem: Adult Inpatient Plan of Care  Goal: Plan of Care Review  Outcome: Ongoing, Progressing  Goal: Patient-Specific Goal (Individualized)  Outcome: Ongoing, Progressing  Goal: Absence of Hospital-Acquired Illness or Injury  Outcome: Ongoing, Progressing  Intervention: Identify and Manage Fall Risk  Recent Flowsheet  Documentation  Taken 8/6/2021 0400 by Christine Jimenes RN  Safety Promotion/Fall Prevention:   activity supervised   assistive device/personal items within reach   clutter free environment maintained  Taken 8/6/2021 0200 by Christine Jimenes RN  Safety Promotion/Fall Prevention:   activity supervised   assistive device/personal items within reach   clutter free environment maintained   safety round/check completed  Taken 8/6/2021 0000 by Christine Jimenes RN  Safety Promotion/Fall Prevention:   activity supervised   assistive device/personal items within reach   clutter free environment maintained   safety round/check completed  Taken 8/5/2021 2200 by Christine Jimenes RN  Safety Promotion/Fall Prevention:   activity supervised   assistive device/personal items within reach   clutter free environment maintained   safety round/check completed  Taken 8/5/2021 2000 by Christine Jimenes RN  Safety Promotion/Fall Prevention:   assistive device/personal items within reach   activity supervised   clutter free environment maintained   safety round/check completed  Goal: Optimal Comfort and Wellbeing  Outcome: Ongoing, Progressing  Intervention: Provide Person-Centered Care  Recent Flowsheet Documentation  Taken 8/5/2021 2000 by Christine Jimenes RN  Trust Relationship/Rapport:   care explained   choices provided   emotional support provided  Goal: Readiness for Transition of Care  Outcome: Ongoing, Progressing     Problem: Skin Injury Risk Increased  Goal: Skin Health and Integrity  Outcome: Ongoing, Progressing   Goal Outcome Evaluation:

## 2021-08-06 NOTE — CASE MANAGEMENT/SOCIAL WORK
Continued Stay Note  DOREEN Amin     Patient Name: Benson Mclean  MRN: [id number]  Today's Date: 8/6/2021    Admit Date: 8/3/2021    Discharge Plan     Row Name 08/06/21 1437       Plan    Plan  anticipate return to [facility]. Will need precert. No PASRR needed. Pharm updated to reflect [facility] ([facility])    Plan Comments  DC barriers: monitoring Hgb, PRBC, renal following        Expected Discharge Date and Time     Expected Discharge Date Expected Discharge Time    Aug 8, 2021         Phone communication or documentation only - no physical contact with patient or family.      Charlene Garcia RN

## 2021-08-06 NOTE — PROGRESS NOTES
[facility] Daily Progress Note    Patient Name: Benson Mclean  : 1950  MRN: [id number]  Primary Care Physician:  Samantha Zabala APRN  Date of admission: 8/3/2021      Subjective      Chief Complaint: Palpitations      Patient Reports Benson Mclean is a 70 y.o. male w/PMH of CHF, CKD, DM, HTN, HLD, CAD, COPD, A. fib, CABG, ANNETTE, CVA, neuropathy, obesity, depression, COVID-19 who presents to [facility] ED with palpitations, patient was discharged from this facility yesterday after a 12-day admission.  Patient states tachycardia progressively worsened after discharge from this facility yesterday, no aggravating or alleviating factors noted.  Patient admits to chest pain, nausea, weakness, palpitations, edema, dyspnea.  Patient denies fever, chills, nausea, vomiting.  As tachycardia did not improve he decided to go to [facility] ED.        Upon arrival to the ED vitals temp 97, HR 93, RR 22, /75, O2 sat 97% on 4 L nasal cannula.  Labs notable for troponin 0.117, proBNP 7648, glucose 198, , K4.5, CO2 32, creatinine 1.91, BUN 68, GFR 35, WBC 7.4, Hgb 8.0, platelets 334, neutrophils 65.9.  EKG shows tachycardia rate 126 repolarization abnormality suggest ischemia diffuse leads.  Chest x-ray shows stable cardiomegaly with pulmonary interstitial edema pattern trace bilateral pleural effusions.  Patient treated in the ED with 10 mg IV Cardizem, IV Cardizem drip initiated.    Readmitted from [facility] after having been discharged 24 hours ago. 21, 3:42 PM EDT     Patient much improved.  Heart rate controlled.  No significant complaints 21, 8:38 PM EDT        ROS Constitutional: Negative. Negative for chills and fever.   HENT: Negative.    Eyes: Negative.    Cardiovascular: Resolved chest pain, dyspnea on exertion, irregular heartbeat, leg swelling and palpitations.   Respiratory: Resolved shortness of breath.  Negative for cough.    Endocrine: Negative.    Hematologic/Lymphatic: Negative.    Skin: Negative.    Musculoskeletal: Positive for arthritis.   Gastrointestinal: Negative.  Negative for nausea and vomiting.   Genitourinary: Negative.    Neurological: Negative.    Psychiatric/Behavioral: Negative.    Allergic/Immunologic: Negative.    All other systems reviewed and are negative.          Objective      Vitals:   Temp:  [97.3 °F (36.3 °C)-98.6 °F (37 °C)] 97.6 °F (36.4 °C)  Heart Rate:  [60-93] 60  Resp:  [17-20] 20  BP: (107-145)/(49-70) 122/53  Flow (L/min):  [2-10] 10    Physical Exam  Vitals and nursing note reviewed.   Constitutional:       General: He is not in acute distress.     Appearance: Normal appearance. He is well-developed. He is not ill-appearing, toxic-appearing or diaphoretic.   HENT:      Head: Normocephalic and atraumatic.      Right Ear: Ear canal and external ear normal.      Left Ear: Ear canal and external ear normal.      Nose: Nose normal. No congestion or rhinorrhea.      Mouth/Throat:      Mouth: Mucous membranes are moist.      Pharynx: No oropharyngeal exudate.   Eyes:      General: No scleral icterus.        Right eye: No discharge.         Left eye: No discharge.      Extraocular Movements: Extraocular movements intact.      Conjunctiva/sclera: Conjunctivae normal.      Pupils: Pupils are equal, round, and reactive to light.   Neck:      Thyroid: No thyromegaly.      Vascular: No carotid bruit or JVD.      Trachea: No tracheal deviation.   Cardiovascular:      Rate and Rhythm: Normal rate. Rhythm irregular.      Heart sounds: Normal heart sounds. No murmur heard.   No friction rub. No gallop.       Comments: Absent pedal pulses  Pulmonary:      Effort: Pulmonary effort is normal. No respiratory distress.      Breath sounds: Normal breath sounds. No stridor. No wheezing, rhonchi or rales.   Chest:      Chest wall: No tenderness.   Abdominal:      General: Bowel sounds are normal. There  is no distension.      Palpations: Abdomen is soft. There is no mass.      Tenderness: There is no abdominal tenderness. There is no guarding or rebound.      Hernia: No hernia is present.   Musculoskeletal:         General: No swelling, tenderness, deformity or signs of injury. Normal range of motion.      Cervical back: Normal range of motion and neck supple. No rigidity. No muscular tenderness.      Right lower leg: No edema.      Left lower leg: No edema.   Lymphadenopathy:      Cervical: No cervical adenopathy.   Skin:     General: Skin is warm and dry.      Coloration: Skin is not jaundiced or pale.      Findings: No bruising, erythema or rash.   Neurological:      General: No focal deficit present.      Mental Status: He is alert and oriented to person, place, and time. Mental status is at baseline.      Cranial Nerves: No cranial nerve deficit.      Sensory: No sensory deficit.      Motor: No weakness or abnormal muscle tone.      Coordination: Coordination normal.   Psychiatric:         Mood and Affect: Mood normal.         Behavior: Behavior normal.         Thought Content: Thought content normal.         Judgment: Judgment normal.         Noted    Result Review    Result Review:  I have personally reviewed the results from the time of this admission to 8/5/2021 20:37 EDT and agree with these findings:  [x]  Laboratory  []  Microbiology  [x]  Radiology  []  EKG/Telemetry   []  Cardiology/Vascular   []  Pathology  [x]  Old records  []  Other:   Most notable findings include: Reviewed patient's discharge summary from the last visit 24 hours ago.  That time he was here for the dobutamine drip and systolic CHF.  Labs revealed elevated creatinine of 1.9 normocytic anemia with hemoglobin of 7.1.  Elevated troponin of 0.4.  Decreased iron levels.  BNP of more than 7000    Renal function maintained.  Hemoglobin 7. 08/05/21, 8:40 PM EDT              Assessment/Plan      Brief Patient Summary:  Benson Mclean is  a 70 y.o. male who       amiodarone, 400 mg, Oral, Q12H  aspirin, 81 mg, Oral, Daily  atorvastatin, 40 mg, Oral, Nightly  carvedilol, 6.25 mg, Oral, BID With Meals  cholecalciferol, 1,000 Units, Oral, Daily  dilTIAZem, 10 mg, Intravenous, Once  dilTIAZem CD, 240 mg, Oral, Q24H  donepezil, 10 mg, Oral, Nightly  DULoxetine, 60 mg, Oral, Daily  ferric gluconate, 250 mg, Intravenous, Daily  furosemide, 80 mg, Oral, BID  gabapentin, 200 mg, Oral, Q12H  hydrALAZINE, 50 mg, Oral, Q12H  insulin lispro, 0-9 Units, Subcutaneous, TID AC  insulin NPH-insulin regular, 30 Units, Subcutaneous, BID With Meals  ipratropium-albuterol, 3 mL, Nebulization, 4x Daily - RT  isosorbide mononitrate, 60 mg, Oral, BID - Nitrates  sodium chloride, 10 mL, Intravenous, Q12H  spironolactone, 25 mg, Oral, Daily  vitamin B-12, 1,000 mcg, Oral, Daily             Active Hospital Problems:  Active Hospital Problems    Diagnosis    • **Atrial fibrillation with RVR (CMS/[facility])    • Acute on chronic systolic heart failure (CMS/[facility])    • Anemia of renal disease    • Tobacco use disorder    • CKD (chronic kidney disease) stage 3, GFR 30-59 ml/min (CMS/[facility])    • Mixed hyperlipidemia    • Chronic obstructive pulmonary disease, unspecified (CMS/[facility])    • Flaccid hemiplegia of right dominant side as late effect of cerebral infarction (CMS/[facility])    • Unspecified dementia with behavioral disturbance (CMS/[facility])    • Elevated troponin    • Essential hypertension    • S/P CABG (coronary artery bypass graft)    • Chronic venous hypertension (idiopathic) with ulcer and inflammation of bilateral lower extremity (CMS/HCC)    • Chronic foot ulcer (CMS/[facility])    • Coronary artery disease    • ANNETTE treated with BiPAP    • Diabetic neuropathy (CMS/[facility])      Plan:     Atrial fibrillation:   Rate control  Beta-blockers  Calcium channel blockers  Digoxin  +/-Amiodarone  Anticoagulation per [facility] VASC2 SCORE  Close monitoring      Smoking:  Counseled to quit smoking  Long-term  compliance is suspect  Nicotine replacement while in the hospital      Renal failure/insufficiency/injury:  Hydration  Supportive treatment  Cut down or hold ACE inhibitors  Avoid nephrotoxic medications   Address diuretics        Hyperlipidemia:  Check lipid panel  Statins if indicated  Add Ezetimibe if appropriate  Only Icosapent Ethyl (omega-3) demonstrated efficacy in Hypertriglyceridemia. (STRENGTH, REDUCE IT trials)    His prognosis remains poor due to above-mentioned comorbidities.  And he remains at high risk for readmission whenever he is discharged.  I would suggest torsemide on discharge instead of furosemide due to poor bioavailability of the latter.    Anemia-transfuse  Diuril dose after transfusion    Heart rate controlled.  Shall review with nephrology regarding discharge plan    DVT prophylaxis:  No DVT prophylaxis order currently exists.    CODE STATUS:    Code Status: CPR  Medical Interventions (Level of Support Prior to Arrest): Full      Disposition:  I expect patient to be discharged after stabilization of his CHF and heart rate.    This patient has been examined wearing appropriate Personal Protective Bthdexqvh89/05/21      Electronically signed by Stanton Junior MD, 08/05/21, 20:37 EDT.  [facility] Hospitalist Team

## 2021-08-06 NOTE — DISCHARGE SUMMARY
[facility] Medicine Services  DISCHARGE SUMMARY    Patient Name: Benson Mclean  : 1950  MRN: [id number]    Date of Admission: 8/3/2021  Date of Discharge:   21, 4:42 PM EDT    Primary Care Physician: Samantha Zabala APRN      Presenting Problem:   A-fib (CMS/[facility]) [I48.91]  Tachycardia [R00.0]  Dyspnea, unspecified type [R06.00]    Active and Resolved Hospital Problems:  Active Hospital Problems    Diagnosis POA   • **Atrial fibrillation with RVR (CMS/[facility]) [I48.91] Yes     Priority: High   • Acute on chronic systolic heart failure (CMS/[facility]) [I50.23] Yes     Priority: Medium   • Anemia of renal disease [N18.9, D63.1] Yes   • Tobacco use disorder [F17.200] Yes   • CKD (chronic kidney disease) stage 3, GFR 30-59 ml/min (CMS/[facility]) [N18.30] Yes   • Mixed hyperlipidemia [E78.2] Yes   • Chronic obstructive pulmonary disease, unspecified (CMS/[facility]) [J44.9] Yes   • Flaccid hemiplegia of right dominant side as late effect of cerebral infarction (CMS/[facility]) [I69.351] Not Applicable   • Unspecified dementia with behavioral disturbance (CMS/[facility]) [F03.91] Yes   • Elevated troponin [R77.8] Yes   • Essential hypertension [I10] Yes   • S/P CABG (coronary artery bypass graft) [Z95.1] Not Applicable   • Chronic venous hypertension (idiopathic) with ulcer and inflammation of bilateral lower extremity (CMS/[facility]) [I87.333, L97.919, L97.929] Yes   • Chronic foot ulcer (CMS/HCC) [L97.509] Yes   • Coronary artery disease [I25.10] Yes   • ANNETTE treated with BiPAP [G47.33] Yes   • Diabetic neuropathy (CMS/HCC) [E11.40] Yes      Resolved Hospital Problems   No resolved problems to display.         Hospital Course     Hospital Course:  Benson Mclean is a 70 y.o. male Benson Mclean is a 70 y.o. male w/PMH of CHF, CKD, DM, HTN, HLD, CAD, COPD, A. fib, CABG, ANNETTE, CVA, neuropathy, obesity, depression, COVID-19 who presents to [facility] ED with palpitations, patient was discharged from this  facility yesterday after a 12-day admission.  Patient states tachycardia progressively worsened after discharge from this facility yesterday, no aggravating or alleviating factors noted.  Patient admits to chest pain, nausea, weakness, palpitations, edema, dyspnea.  Patient denies fever, chills, nausea, vomiting.  As tachycardia did not improve he decided to go to [facility] ED.        Upon arrival to the ED vitals temp 97, HR 93, RR 22, /75, O2 sat 97% on 4 L nasal cannula.  Labs notable for troponin 0.117, proBNP 7648, glucose 198, , K4.5, CO2 32, creatinine 1.91, BUN 68, GFR 35, WBC 7.4, Hgb 8.0, platelets 334, neutrophils 65.9.  EKG shows tachycardia rate 126 repolarization abnormality suggest ischemia diffuse leads.  Chest x-ray shows stable cardiomegaly with pulmonary interstitial edema pattern trace bilateral pleural effusions.  Patient treated in the ED with 10 mg IV Cardizem, IV Cardizem drip initiated.    Patient states he did not get his Cardizem at the nursing home as they were out of it and the he was admitted with A. terese RVR for that reason and CHF.  Medications were adjusted again and in fact he is on sinus rhythm now at the time of discharge.  He was seen in consult by cardiology and nephrology.  Condition on discharge improved    ROS:  Constitutional: Negative. Negative for chills and fever.   HENT: Negative.    Eyes: Negative.    Cardiovascular: Positive for chest pain, dyspnea on exertion, irregular heartbeat, leg swelling and palpitations.   Respiratory: Positive for shortness of breath. Negative for cough.    Endocrine: Negative.    Hematologic/Lymphatic: Negative.    Skin: Negative.    Musculoskeletal: Positive for arthritis.   Gastrointestinal: Negative.  Negative for nausea and vomiting.   Genitourinary: Negative.    Neurological: Negative.    Psychiatric/Behavioral: Negative.    Allergic/Immunologic: Negative.    All other systems reviewed and are negative.    Noted      DISCHARGE Follow Up Recommendations for labs and diagnostics: Nephrology cardiology      Reasons For Change In Medications and Indications for New Medications:  A. fib RVR, cardiomyopathy, HFrEF    Day of Discharge     Vital Signs:  Temp:  [97.5 °F (36.4 °C)-98 °F (36.7 °C)] 97.5 °F (36.4 °C)  Heart Rate:  [60-83] 77  Resp:  [16-20] 18  BP: (110-136)/(48-65) 124/55  Flow (L/min):  [2] 2    Physical Exam:  Physical Exam Vitals and nursing note reviewed.   Constitutional:       General: He is not in acute distress.     Appearance: Normal appearance. He is well-developed. He is not ill-appearing, toxic-appearing or diaphoretic.   HENT:      Head: Normocephalic and atraumatic.      Right Ear: Ear canal and external ear normal.      Left Ear: Ear canal and external ear normal.      Nose: Nose normal. No congestion or rhinorrhea.      Mouth/Throat:      Mouth: Mucous membranes are moist.      Pharynx: No oropharyngeal exudate.   Eyes:      General: No scleral icterus.        Right eye: No discharge.         Left eye: No discharge.      Extraocular Movements: Extraocular movements intact.      Conjunctiva/sclera: Conjunctivae normal.      Pupils: Pupils are equal, round, and reactive to light.   Neck:      Thyroid: No thyromegaly.      Vascular: No carotid bruit or JVD.      Trachea: No tracheal deviation.   Cardiovascular:      Rate and Rhythm: Normal rate. Rhythm irregular.      Heart sounds: Normal heart sounds. No murmur heard.   No friction rub. No gallop.       Comments: Absent pedal pulses  Pulmonary:      Effort: Pulmonary effort is normal. No respiratory distress.      Breath sounds: Normal breath sounds. No stridor. No wheezing, rhonchi or rales.   Chest:      Chest wall: No tenderness.   Abdominal:      General: Bowel sounds are normal. There is no distension.      Palpations: Abdomen is soft. There is no mass.      Tenderness: There is no abdominal tenderness. There is no guarding or rebound.       Hernia: No hernia is present.   Musculoskeletal:         General: No swelling, tenderness, deformity or signs of injury. Normal range of motion.      Cervical back: Normal range of motion and neck supple. No rigidity. No muscular tenderness.      Right lower leg: No edema.      Left lower leg: No edema.   Lymphadenopathy:      Cervical: No cervical adenopathy.   Skin:     General: Skin is warm and dry.      Coloration: Skin is not jaundiced or pale.      Findings: No bruising, erythema or rash.   Neurological:      General: No focal deficit present.      Mental Status: He is alert and oriented to person, place, and time. Mental status is at baseline.      Cranial Nerves: No cranial nerve deficit.      Sensory: No sensory deficit.      Motor: No weakness or abnormal muscle tone.      Coordination: Coordination normal.   Psychiatric:         Mood and Affect: Mood normal.         Behavior: Behavior normal.         Thought Content: Thought content normal.         Judgment: Judgment normal.    Reviewed, no change in above data from the prior day.        Pertinent  and/or Most Recent Results     LAB RESULTS:      Lab 08/06/21 0625 08/05/21 0300 08/04/21 0519 08/03/21 1837 07/31/21  0238   WBC 5.70 6.40 7.40 7.40 6.00   HEMOGLOBIN 8.1* 7.0* 7.1* 8.0* 7.7*   HEMATOCRIT 24.0* 21.0* 21.4* 24.5* 23.8*   PLATELETS 307 310 312 334 247   NEUTROS ABS 2.70 3.00 4.44 4.90 3.30   LYMPHS ABS 1.50 1.70  --  1.30 1.40   MONOS ABS 0.90 1.00*  --  0.70 0.70   EOS ABS 0.50* 0.60* 0.30 0.40 0.60*   MCV 87.4 86.0 86.2 86.6 86.3         Lab 08/06/21  0625 08/05/21 0300 08/04/21 0519 08/03/21 1837 08/02/21  0244   SODIUM 139 141 140 139 137   POTASSIUM 4.4 4.6 4.6 4.5 4.6   CHLORIDE 96* 98 97* 95* 97*   CO2 34.0* 31.0* 33.0* 32.0* 34.0*   ANION GAP 9.0 12.0 10.0 12.0 6.0   BUN 66* 68* 65* 68* 64*   CREATININE 2.19* 2.04* 1.95* 1.91* 1.95*   GLUCOSE 66 71 171* 198* 92   CALCIUM 8.9 9.0 9.0 9.4 9.1   MAGNESIUM  --  2.1  --   --   --     PHOSPHORUS  --  5.1*  --   --   --              Lab 08/04/21 0519 08/03/21  2308 08/03/21  1837   PROBNP  --   --  7,648.0*   TROPONIN T 0.792* 0.411* 0.117*             Lab 08/05/21 2130 08/04/21 0519   IRON  --  36*   IRON SATURATION  --  12*   TIBC  --  299   TRANSFERRIN  --  201   FERRITIN  --  168.20   ABO TYPING A  --    RH TYPING Positive  --    ANTIBODY SCREEN Negative  --          Brief Urine Lab Results  (Last result in the past 365 days)      Color   Clarity   Blood   Leuk Est   Nitrite   Protein   CREAT   Urine HCG        07/28/21 1703             44.3       07/28/21 1703 Yellow Hazy  Comment:  Result checked  Negative Large (3+) Negative 30 mg/dL (1+)             Microbiology Results (last 10 days)     Procedure Component Value - Date/Time    COVID PRE-OP / PRE-PROCEDURE SCREENING ORDER (NO ISOLATION) - Swab, Nasopharynx [[id number]]  (Normal) Collected: 08/03/21 2135    Lab Status: Final result Specimen: Swab from Nasopharynx Updated: 08/03/21 2158    Narrative:      The following orders were created for panel order COVID PRE-OP / PRE-PROCEDURE SCREENING ORDER (NO ISOLATION) - Swab, Nasopharynx.  Procedure                               Abnormality         Status                     ---------                               -----------         ------                     COVID-19,CEPHEID,COR/ZEYNEP...[[id number]]  Normal              Final result                 Please view results for these tests on the individual orders.    COVID-19,CEPHEID,COR/ZEYNEP/PAD/BEATRICE IN-HOUSE(OR EMERGENT/ADD-ON),NP SWAB IN TRANSPORT MEDIA 3-4 HR TAT, RT-PCR - Swab, Nasopharynx [[id number]]  (Normal) Collected: 08/03/21 2135    Lab Status: Final result Specimen: Swab from Nasopharynx Updated: 08/03/21 2158     COVID19 Not Detected    Narrative:      Fact sheet for providers: https://www.fda.gov/media/481581/download     Fact sheet for patients: https://www.fda.gov/media/800952/download  Fact sheet for providers:  https://www.fda.gov/media/867533/download    Fact sheet for patients: https://www.fda.gov/media/973847/download    Test performed by PCR.    Eosinophil Smear - Urine, Urine, Clean Catch [[id number]]  (Normal) Collected: 07/28/21 1703    Lab Status: Final result Specimen: Urine, Clean Catch Updated: 07/28/21 1850     Eosinophil Smear 0 % EOS/100 Cells           XR Chest 1 View    Result Date: 8/3/2021  Impression: 1. Cardiomegaly with pulmonary interstitial edema pattern and trace bilateral pleural effusions.  Electronically Signed By-Anselmo Vaz MD On:8/3/2021 7:00 PM This report was finalized on [id number] by  Anselmo Vaz MD.    XR Chest 1 View    Result Date: 7/20/2021  Impression: 1. There are mild bibasilar airspace opacities and possible small pleural effusions. These could be due to edema or pneumonia. There has been slight improvement from 06/25/2021. 2. Cardiomegaly.  Electronically Signed By-Brittney Ureña MD On:7/20/2021 10:33 PM This report was finalized on [id number] by  Brittney Ureña MD.              Results for orders placed during the hospital encounter of 07/20/21    Adult Transthoracic Echo Complete With Contrast if Necessary Per Protocol    Interpretation Summary  · Estimated left ventricular EF = 30% Left ventricular systolic function is moderately decreased.    Occasions  Shortness of breath.    Technically satisfactory study.  Mitral valve is structurally normal.  Mild mitral regurgitation.  Tricuspid valve is structurally normal.  Aortic valve is structurally normal.  Pulmonic valve could not be well visualized.  No evidence for tricuspid or aortic regurgitation is seen by Doppler study.  Biatrial and biventricular enlargement is present.  Diffuse left ventricular hypocontractility with ejection fraction of 30%.  Atrial septum is intact.  Aorta is normal.  No pericardial effusion or intracardiac thrombus is seen.    Impression  Mild mitral regurgitation.  Significant biatrial  and biventricular enlargement.  Diffuse left ventricular hypocontractility with ejection fraction of 30%.  No pericardial effusion or intracardiac thrombus is seen.      Labs Pending at Discharge:      Procedures Performed           Consults:   Consults     Date and Time Order Name Status Description    8/3/2021  9:34 PM Inpatient Nephrology Consult      8/3/2021  9:34 PM Inpatient Cardiology Consult Completed     7/21/2021  7:30 PM Inpatient Cardiology Consult Completed             Discharge Details        Discharge Medications      New Medications      Instructions Start Date   amiodarone 200 MG tablet  Commonly known as: PACERONE   200 mg, Oral, Every 12 Hours Scheduled      carvedilol 6.25 MG tablet  Commonly known as: COREG   6.25 mg, Oral, 2 Times Daily With Meals      dilTIAZem  MG 24 hr capsule  Commonly known as: CARDIZEM CD   240 mg, Oral, Every 24 Hours Scheduled   Start Date: August 7, 2021        Changes to Medications      Instructions Start Date   furosemide 80 MG tablet  Commonly known as: LASIX  What changed:   medication strength  how much to take   80 mg, Oral, 2 Times Daily         Continue These Medications      Instructions Start Date   albuterol sulfate  (90 Base) MCG/ACT inhaler  Commonly known as: PROVENTIL HFA;VENTOLIN HFA;PROAIR HFA   2 puffs, Inhalation, Every 4 Hours PRN      apixaban 5 MG tablet tablet  Commonly known as: ELIQUIS   5 mg, Oral, Daily      aspirin 81 MG chewable tablet   81 mg, Oral, Daily      atorvastatin 40 MG tablet  Commonly known as: LIPITOR   40 mg, Oral, Nightly      cholecalciferol 25 MCG (1000 UT) tablet  Commonly known as: VITAMIN D3   1,000 Units, Oral, Daily      dextrose 40 % gel  Commonly known as: GLUTOSE   Oral, Every 1 Hour PRN      donepezil 10 MG tablet  Commonly known as: ARICEPT   10 mg, Oral, Nightly      DULoxetine HCl 60 MG capsule  Commonly known as: DRIZALMA   60 mg, Oral, Daily      gabapentin 100 MG capsule  Commonly known as:  NEURONTIN   200 mg, Oral, 2 times daily      hydrALAZINE 50 MG tablet  Commonly known as: APRESOLINE   50 mg, Oral, 2 times daily, Hold for SBP <110      isosorbide mononitrate 60 MG 24 hr tablet  Commonly known as: IMDUR   60 mg, Oral, 2 Times Daily      nitroglycerin 0.4 MG SL tablet  Commonly known as: NITROSTAT   0.4 mg, Sublingual, Every 5 Minutes PRN, Take no more than 3 doses in 15 minutes.      spironolactone 25 MG tablet  Commonly known as: ALDACTONE   25 mg, Oral, Daily      tiotropium 18 MCG per inhalation capsule  Commonly known as: Spiriva HandiHaler   1 capsule, Inhalation, Daily - RT      vitamin B-12 1000 MCG tablet  Commonly known as: CYANOCOBALAMIN   1,000 mcg, Oral, Daily         Stop These Medications    metoprolol succinate  MG 24 hr tablet  Commonly known as: TOPROL-XL            Allergies   Allergen Reactions   • Codeine Itching         Discharge Disposition:  Skilled Nursing Facility (DC - External)    Diet:  Hospital:  Diet Order   Procedures   • Diet Cardiac, Diabetic/Consistent Carbs; Healthy Heart; Diabetic - Consistent Carb         Discharge Activity:   Activity Instructions     Activity as Tolerated              CODE STATUS:  Code Status and Medical Interventions:   Ordered at: 08/03/21 2134     Code Status:    CPR     Medical Interventions (Level of Support Prior to Arrest):    Full         Future Appointments   Date Time Provider Department Center   9/16/2021  1:00 PM HERI Chung DPM [facility]   1/3/2022  2:50 PM Jose G Rm MD [facility]       Additional Instructions for the Follow-ups that You Need to Schedule     Discharge Follow-up with PCP   As directed       Currently Documented PCP:    Samantha Zabala APRN    PCP Phone Number:    [phone]     Follow Up Details: If no PCP, call MD finder at [phone]               Time spent on Discharge including face to face service: 35 minutes care coordination with nursing for current care and with  nephrology for current care and discharge planning and diuretics management.  This patient has been examined wearing appropriate Personal Protective Equipment . 08/06/21      Signature: Stanton Junior MD, FACP

## 2021-08-07 LAB
ANION GAP SERPL CALCULATED.3IONS-SCNC: 10 MMOL/L (ref 5–15)
BH BB BLOOD EXPIRATION DATE: NORMAL
BH BB BLOOD TYPE BARCODE: 6200
BH BB DISPENSE STATUS: NORMAL
BH BB PRODUCT CODE: NORMAL
BH BB UNIT NUMBER: NORMAL
BUN SERPL-MCNC: 67 MG/DL (ref 8–23)
BUN/CREAT SERPL: 28.5 (ref 7–25)
CALCIUM SPEC-SCNC: 8.8 MG/DL (ref 8.6–10.5)
CHLORIDE SERPL-SCNC: 95 MMOL/L (ref 98–107)
CO2 SERPL-SCNC: 33 MMOL/L (ref 22–29)
CREAT SERPL-MCNC: 2.35 MG/DL (ref 0.76–1.27)
CROSSMATCH INTERPRETATION: NORMAL
GFR SERPL CREATININE-BSD FRML MDRD: 28 ML/MIN/1.73
GLUCOSE BLDC GLUCOMTR-MCNC: 107 MG/DL (ref 70–105)
GLUCOSE BLDC GLUCOMTR-MCNC: 111 MG/DL (ref 70–105)
GLUCOSE BLDC GLUCOMTR-MCNC: 138 MG/DL (ref 70–105)
GLUCOSE BLDC GLUCOMTR-MCNC: 212 MG/DL (ref 70–105)
GLUCOSE SERPL-MCNC: 139 MG/DL (ref 65–99)
POTASSIUM SERPL-SCNC: 4.6 MMOL/L (ref 3.5–5.2)
SODIUM SERPL-SCNC: 138 MMOL/L (ref 136–145)
UNIT  ABO: NORMAL
UNIT  RH: NORMAL

## 2021-08-07 PROCEDURE — 94799 UNLISTED PULMONARY SVC/PX: CPT

## 2021-08-07 PROCEDURE — 63710000001 INSULIN LISPRO (HUMAN) PER 5 UNITS: Performed by: INTERNAL MEDICINE

## 2021-08-07 PROCEDURE — 99232 SBSQ HOSP IP/OBS MODERATE 35: CPT | Performed by: INTERNAL MEDICINE

## 2021-08-07 PROCEDURE — 80048 BASIC METABOLIC PNL TOTAL CA: CPT | Performed by: INTERNAL MEDICINE

## 2021-08-07 PROCEDURE — 82962 GLUCOSE BLOOD TEST: CPT

## 2021-08-07 PROCEDURE — 99233 SBSQ HOSP IP/OBS HIGH 50: CPT | Performed by: INTERNAL MEDICINE

## 2021-08-07 PROCEDURE — 63710000001 INSULIN ISOPHANE & REGULAR PER 5 UNITS: Performed by: INTERNAL MEDICINE

## 2021-08-07 RX ADMIN — DONEPEZIL HYDROCHLORIDE 10 MG: 5 TABLET, FILM COATED ORAL at 21:09

## 2021-08-07 RX ADMIN — INSULIN HUMAN 30 UNITS: 100 INJECTION, SUSPENSION SUBCUTANEOUS at 17:16

## 2021-08-07 RX ADMIN — ISOSORBIDE MONONITRATE 60 MG: 60 TABLET, EXTENDED RELEASE ORAL at 17:13

## 2021-08-07 RX ADMIN — SPIRONOLACTONE 25 MG: 25 TABLET ORAL at 08:45

## 2021-08-07 RX ADMIN — HYDRALAZINE HYDROCHLORIDE 50 MG: 25 TABLET, FILM COATED ORAL at 21:10

## 2021-08-07 RX ADMIN — INSULIN HUMAN 30 UNITS: 100 INJECTION, SUSPENSION SUBCUTANEOUS at 08:44

## 2021-08-07 RX ADMIN — FUROSEMIDE 80 MG: 40 TABLET ORAL at 08:45

## 2021-08-07 RX ADMIN — CYANOCOBALAMIN TAB 1000 MCG 1000 MCG: 1000 TAB at 08:45

## 2021-08-07 RX ADMIN — APIXABAN 5 MG: 5 TABLET, FILM COATED ORAL at 21:09

## 2021-08-07 RX ADMIN — Medication 10 ML: at 21:09

## 2021-08-07 RX ADMIN — FUROSEMIDE 80 MG: 40 TABLET ORAL at 17:13

## 2021-08-07 RX ADMIN — ATORVASTATIN CALCIUM 40 MG: 40 TABLET, FILM COATED ORAL at 21:09

## 2021-08-07 RX ADMIN — GABAPENTIN 200 MG: 100 CAPSULE ORAL at 21:07

## 2021-08-07 RX ADMIN — APIXABAN 5 MG: 5 TABLET, FILM COATED ORAL at 08:45

## 2021-08-07 RX ADMIN — DILTIAZEM HYDROCHLORIDE 240 MG: 240 CAPSULE, COATED, EXTENDED RELEASE ORAL at 08:55

## 2021-08-07 RX ADMIN — DULOXETINE 60 MG: 30 CAPSULE, DELAYED RELEASE ORAL at 08:45

## 2021-08-07 RX ADMIN — IPRATROPIUM BROMIDE AND ALBUTEROL SULFATE 3 ML: 2.5; .5 SOLUTION RESPIRATORY (INHALATION) at 15:37

## 2021-08-07 RX ADMIN — INSULIN LISPRO 4 UNITS: 100 INJECTION, SOLUTION INTRAVENOUS; SUBCUTANEOUS at 12:41

## 2021-08-07 RX ADMIN — ACETAMINOPHEN 650 MG: 325 TABLET ORAL at 21:07

## 2021-08-07 RX ADMIN — IPRATROPIUM BROMIDE AND ALBUTEROL SULFATE 3 ML: 2.5; .5 SOLUTION RESPIRATORY (INHALATION) at 20:41

## 2021-08-07 RX ADMIN — Medication 1000 UNITS: at 08:45

## 2021-08-07 RX ADMIN — GABAPENTIN 200 MG: 100 CAPSULE ORAL at 08:45

## 2021-08-07 RX ADMIN — ASPIRIN 81 MG: 81 TABLET, CHEWABLE ORAL at 08:45

## 2021-08-07 RX ADMIN — ISOSORBIDE MONONITRATE 60 MG: 60 TABLET, EXTENDED RELEASE ORAL at 08:55

## 2021-08-07 RX ADMIN — IPRATROPIUM BROMIDE AND ALBUTEROL SULFATE 3 ML: 2.5; .5 SOLUTION RESPIRATORY (INHALATION) at 10:43

## 2021-08-07 RX ADMIN — Medication 10 ML: at 08:44

## 2021-08-07 NOTE — PROGRESS NOTES
Referring Provider: Stanton Junior MD    Reason for follow-up:  Atrial fibrillation  Cardiomyopathy  Shortness of breath     Patient Care Team:  Samantha Zabala APRN as PCP - General  Samantha Zabala APRN as PCP - Family Medicine  Jose G Rm MD as Consulting Physician (Cardiology)    Subjective .  Feeling better, Denies chest pain or dyspnea     ROS    Review of Symptoms:  Constitutional: Patient afebrile no chills or unexpected weight changes  Respiratory: No cough, no wheezing or dyspnea  Cardiovascular: Today the patient complains of no chest pain, palpitations, dyspnea, orthopnea and no edema  Gastrointestinal: No nausea, vomiting, constipation or diarrhea.  No melena or dark stools    All other systems reviewed and are negative          History  Past Medical History:   Diagnosis Date   • Appetite loss    • Brain bleed (CMS/HCC)    • Carpal tunnel syndrome on left     carpal tunnel on left hand   • CHF (congestive heart failure) (CMS/HCC)    • Diabetic neuropathy (CMS/HCC)    • DM2 (diabetes mellitus, type 2) (CMS/HCC)    • History of angina    • Hyperlipidemia    • Hypogonadism in male    • Obesity    • Sleep apnea    • Stroke (CMS/HCC)    • Unsteady gait        Past Surgical History:   Procedure Laterality Date   • ANGIOPLASTY      X2   • APPENDECTOMY     • CARDIAC CATHETERIZATION  11/13/2015   • CARDIAC CATHETERIZATION N/A 5/24/2021    Procedure: Left Heart Cath and coronary angiogram;  Surgeon: Jose G Rm MD;  Location: [facility] CATH INVASIVE LOCATION;  Service: Cardiovascular;  Laterality: N/A;   • CARDIAC CATHETERIZATION  5/24/2021    Procedure: Saphenous Vein Graft;  Surgeon: Jose G Rm MD;  Location: [facility] CATH INVASIVE LOCATION;  Service: Cardiovascular;;   • CARDIAC CATHETERIZATION N/A 5/24/2021    Procedure: Percutaneous Coronary Intervention;  Surgeon: Bernabe Zambrano MD;  Location: [facility] CATH INVASIVE LOCATION;  Service: Cardiology;  Laterality: N/A;   • CARDIAC CATHETERIZATION N/A  5/24/2021    Procedure: Stent NIELS coronary;  Surgeon: Bernabe Zambrano MD;  Location:  [facility] CATH INVASIVE LOCATION;  Service: Cardiology;  Laterality: N/A;   • CARDIAC CATHETERIZATION N/A 5/26/2021    Procedure: Percutaneous Coronary Intervention;  Surgeon: Bernabe Zambrano MD;  Location:  [facility] CATH INVASIVE LOCATION;  Service: Cardiovascular;  Laterality: N/A;   • CARDIAC CATHETERIZATION N/A 5/26/2021    Procedure: Stent NIELS bypass graft;  Surgeon: Bernabe Zambrano MD;  Location:  [facility] CATH INVASIVE LOCATION;  Service: Cardiovascular;  Laterality: N/A;   • CARDIAC ELECTROPHYSIOLOGY PROCEDURE N/A 5/24/2021    Procedure: Temporary Pacemaker;  Surgeon: Bernabe Zambrano MD;  Location:  [facility] CATH INVASIVE LOCATION;  Service: Cardiology;  Laterality: N/A;   • CARDIAC ELECTROPHYSIOLOGY PROCEDURE N/A 5/26/2021    Procedure: Temporary Pacemaker;  Surgeon: Bernabe Zambrano MD;  Location: [facility] C[facility] INVASIVE LOC[facility];  Service: Cardiovascular;  Laterality: N/A;   • CARPAL TUNNEL RELEASE Left 04/29/2018    carpal tunnel- lt hand// other hand surgeries    • CATARACT EXTRACTION, BILATERAL  2002    Dr. Lux Acosta   • CHOLECYSTECTOMY     • COLON RESECTION  2005   • CORONARY ANGIOPLASTY     • CORONARY ANGIOPLASTY WITH STENT PLACEMENT  11/13/2015    PTCA stent to proximal in stent and mid to distal lad   • CORONARY ANGIOPLASTY WITH STENT PLACEMENT  09/16/2016    PTCA stent to mid lad and stent to vein graft to marginal   • CORONARY ARTERY BYPASS GRAFT  2005    @ [facility]   • CYST REMOVAL      cyst removed from scrotum   • FOOT SURGERY Right 07/17/2018   • FOOT SURGERY Left 02/04/2019   • TOE AMPUTATION Right     right toe removed D/T infected cut that went to the bone       Family History   Problem Relation Age of Onset   • Heart disease Mother    • Heart disease Father    • Diabetes Sister    • Heart disease Sister    • Diabetes Brother    • Mental illness Brother        Social History     Tobacco Use   • Smoking status: Former Smoker      Packs/day: 1.00     Years: 60.00     Pack years: 60.00     Types: Cigarettes   • Smokeless tobacco: Never Used   • Tobacco comment: Patient quit smoking 11/2020   Vaping Use   • Vaping Use: Never used   Substance Use Topics   • Alcohol use: No   • Drug use: Yes     Frequency: 1.0 times per week     Types: Marijuana     Comment: socially        Medications Prior to Admission   Medication Sig Dispense Refill Last Dose   • albuterol sulfate  (90 Base) MCG/ACT inhaler Inhale 2 puffs Every 4 (Four) Hours As Needed.      • apixaban (ELIQUIS) 5 MG tablet tablet Take 5 mg by mouth Daily.      • aspirin 81 MG chewable tablet Chew 81 mg Daily.      • atorvastatin (LIPITOR) 40 MG tablet Take 40 mg by mouth Every Night.      • cholecalciferol (VITAMIN D3) 25 MCG (1000 UT) tablet Take 1,000 Units by mouth Daily.      • dextrose (GLUTOSE) 40 % gel Take  by mouth Every 1 (One) Hour As Needed for Low Blood Sugar.      • donepezil (ARICEPT) 10 MG tablet Take 10 mg by mouth Every Night.      • DULoxetine HCl (DRIZALMA) 60 MG capsule Take 60 mg by mouth Daily.      • gabapentin (NEURONTIN) 100 MG capsule Take 2 capsules by mouth 2 (two) times a day. 6 capsule 0    • hydrALAZINE (APRESOLINE) 50 MG tablet Take 50 mg by mouth 2 (two) times a day. Hold for SBP <110      • isosorbide mononitrate (IMDUR) 60 MG 24 hr tablet Take 60 mg by mouth 2 (Two) Times a Day.      • metoprolol succinate XL (TOPROL-XL) 100 MG 24 hr tablet Take 1 tablet by mouth Daily for 30 days. 30 tablet 0    • nitroglycerin (NITROSTAT) 0.4 MG SL tablet Place 1 tablet under the tongue Every 5 (Five) Minutes As Needed for Chest Pain (Only if SBP Greater Than 100). Take no more than 3 doses in 15 minutes. 30 tablet 12    • spironolactone (ALDACTONE) 25 MG tablet Take 1 tablet by mouth Daily for 30 days. 30 tablet 0    • tiotropium (Spiriva HandiHaler) 18 MCG per inhalation capsule Place 1 capsule into inhaler and inhale Daily. 30 capsule 1    • vitamin B-12  "(CYANOCOBALAMIN) 1000 MCG tablet Take 1,000 mcg by mouth Daily.      • [DISCONTINUED] furosemide (LASIX) 40 MG tablet Take 1 tablet by mouth 2 (Two) Times a Day for 30 days. 60 tablet 0        Allergies  Codeine    Scheduled Meds:amiodarone, 200 mg, Oral, Q12H  apixaban, 5 mg, Oral, Q12H  aspirin, 81 mg, Oral, Daily  atorvastatin, 40 mg, Oral, Nightly  carvedilol, 6.25 mg, Oral, BID With Meals  cholecalciferol, 1,000 Units, Oral, Daily  dilTIAZem, 10 mg, Intravenous, Once  dilTIAZem CD, 240 mg, Oral, Q24H  donepezil, 10 mg, Oral, Nightly  DULoxetine, 60 mg, Oral, Daily  ferric gluconate, 250 mg, Intravenous, Daily  furosemide, 80 mg, Oral, BID  gabapentin, 200 mg, Oral, Q12H  hydrALAZINE, 50 mg, Oral, Q12H  insulin lispro, 0-9 Units, Subcutaneous, TID AC  insulin NPH-insulin regular, 30 Units, Subcutaneous, BID With Meals  ipratropium-albuterol, 3 mL, Nebulization, 4x Daily - RT  isosorbide mononitrate, 60 mg, Oral, BID - Nitrates  sodium chloride, 10 mL, Intravenous, Q12H  spironolactone, 25 mg, Oral, Daily  vitamin B-12, 1,000 mcg, Oral, Daily      Continuous Infusions:   PRN Meds:.•  acetaminophen **OR** acetaminophen **OR** acetaminophen  •  aluminum-magnesium hydroxide-simethicone  •  dextrose  •  dextrose  •  glucagon (human recombinant)  •  insulin lispro **AND** insulin lispro  •  melatonin  •  nitroglycerin  •  ondansetron **OR** ondansetron  •  [COMPLETED] Insert peripheral IV **AND** sodium chloride  •  sodium chloride    Objective     VITAL SIGNS  Vitals:    08/06/21 1820 08/06/21 2300 08/07/21 0211 08/07/21 0612   BP: 133/61 110/45 124/50 132/60   BP Location:  Right arm Right arm Left arm   Patient Position:  Lying Lying Lying   Pulse: 62 57 58    Resp:  16 14 13   Temp:  97.8 °F (36.6 °C) 98.2 °F (36.8 °C) 98.3 °F (36.8 °C)   TempSrc:  Oral Oral Oral   SpO2:  90% 93%    Weight:    116 kg (256 lb 9.9 oz)   Height:           Flowsheet Rows      First Filed Value   Admission Height  180.3 cm (71\") " Documented at 08/03/2021 1733   Admission Weight  113 kg (250 lb) Documented at 08/03/2021 1733            Intake/Output Summary (Last 24 hours) at 8/7/2021 0835  Last data filed at 8/7/2021 0812  Gross per 24 hour   Intake 924 ml   Output 1750 ml   Net -826 ml        TELEMETRY: Atrial fibrillation with controlled ventricular response.  Physical exam  Constitutional: well-nourished, and appears stated age in no acute distress  PERRL: Conjunctiva clear, no pallor, anicteric  HENMT: normocephalic, normal dentition, no cyanosis or pallor  Neck:no bruits, or thrills and bilateral normal carotid upstroke. Normal jugular venous pressure  Cardiovascular: No parasternal heaves an non-displaced focal PMI. Normal rate and rhythm: no rub, gallop, murmur or click and normal S1 and S2; no lower or upper extremity edema.   Lungs: unlabored, no wheezing with no rales or rhonchi on auscultation.  Extremities: Warm, no clubbing, cyanosis. Full and equal peripheral pulses in extremities with no bruits appreciated.   Abdomen: soft, non-tender, non-distended  Musculoskeletal: no joint tenderness or swelling and no erythema  Skin: Warm and dry, non-erythematous   Neuro:alert and normal affect. Oriented to time, place and person.             Results Review:   I reviewed the patient's new clinical results.  Lab Results (last 24 hours)     Procedure Component Value Units Date/Time    POC Glucose Once [[id number]]  (Abnormal) Collected: 08/07/21 0714    Specimen: Blood Updated: 08/07/21 0719     Glucose 111 mg/dL      Comment: Serial Number: [id number]:  [id number]       Basic Metabolic Panel [[id number]]  (Abnormal) Collected: 08/07/21 0314    Specimen: Blood Updated: 08/07/21 0448     Glucose 139 mg/dL      BUN 67 mg/dL      Creatinine 2.35 mg/dL      Sodium 138 mmol/L      Potassium 4.6 mmol/L      Chloride 95 mmol/L      CO2 33.0 mmol/L      Calcium 8.8 mg/dL      eGFR Non African Amer 28 mL/min/1.73      BUN/Creatinine Ratio 28.5      Anion Gap 10.0 mmol/L     Narrative:      GFR Normal >60  Chronic Kidney Disease <60  Kidney Failure <15      POC Glucose Once [[id number]]  (Abnormal) Collected: 08/06/21 2027    Specimen: Blood Updated: 08/06/21 2029     Glucose 163 mg/dL      Comment: Serial Number: [id number]:  [id number]       POC Glucose Once [[id number]]  (Abnormal) Collected: 08/06/21 1705    Specimen: Blood Updated: 08/06/21 1707     Glucose 148 mg/dL      Comment: Serial Number: [id number]:  [id number]             Imaging Results (Last 24 Hours)     ** No results found for the last 24 hours. **      LAB RESULTS (LAST 7 DAYS)    CBC  Results from last 7 days   Lab Units 08/06/21  0625 08/05/21 0300 08/04/21  0519 08/03/21 1837   WBC 10*3/mm3 5.70 6.40 7.40 7.40   RBC 10*6/mm3 2.74* 2.45* 2.48* 2.82*   HEMOGLOBIN g/dL 8.1* 7.0* 7.1* 8.0*   HEMATOCRIT % 24.0* 21.0* 21.4* 24.5*   MCV fL 87.4 86.0 86.2 86.6   PLATELETS 10*3/mm3 307 310 312 334       BMP  Results from last 7 days   Lab Units 08/07/21  0314 08/06/21  0625 08/05/21  0300 08/04/21  0519 08/03/21  1837 08/02/21  0244 08/01/21  0305   SODIUM mmol/L 138 139 141 140 139 137 141   POTASSIUM mmol/L 4.6 4.4 4.6 4.6 4.5 4.6 4.7   CHLORIDE mmol/L 95* 96* 98 97* 95* 97* 100   CO2 mmol/L 33.0* 34.0* 31.0* 33.0* 32.0* 34.0* 33.0*   BUN mg/dL 67* 66* 68* 65* 68* 64* 55*   CREATININE mg/dL 2.35* 2.19* 2.04* 1.95* 1.91* 1.95* 1.99*   GLUCOSE mg/dL 139* 66 71 171* 198* 92 64*   MAGNESIUM mg/dL  --   --  2.1  --   --   --   --    PHOSPHORUS mg/dL  --   --  5.1*  --   --   --   --        CMP   Results from last 7 days   Lab Units 08/07/21  0314 08/06/21  0625 08/05/21  0300 08/04/21  0519 08/03/21  1837 08/02/21  0244 08/01/21  0305   SODIUM mmol/L 138 139 141 140 139 137 141   POTASSIUM mmol/L 4.6 4.4 4.6 4.6 4.5 4.6 4.7   CHLORIDE mmol/L 95* 96* 98 97* 95* 97* 100   CO2 mmol/L 33.0* 34.0* 31.0* 33.0* 32.0* 34.0* 33.0*   BUN mg/dL 67* 66* 68* 65* 68* 64* 55*   CREATININE mg/dL 2.35*  2.19* 2.04* 1.95* 1.91* 1.95* 1.99*   GLUCOSE mg/dL 139* 66 71 171* 198* 92 64*         BNP        TROPONIN  Results from last 7 days   Lab Units 08/04/21  0519   TROPONIN T ng/mL 0.792*       CoAg        Creatinine Clearance  Estimated Creatinine Clearance: 35.6 mL/min (A) (by C-G formula based on SCr of 2.35 mg/dL (H)).    ABG        Radiology  No radiology results for the last day            EKG              I personally viewed and interpreted the patient's EKG/Telemetry data:    ECHOCARDIOGRAM:    Results for orders placed during the hospital encounter of 07/20/21    Adult Transthoracic Echo Complete With Contrast if Necessary Per Protocol    Interpretation Summary  · Estimated left ventricular EF = 30% Left ventricular systolic function is moderately decreased.    Occasions  Shortness of breath.    Technically satisfactory study.  Mitral valve is structurally normal.  Mild mitral regurgitation.  Tricuspid valve is structurally normal.  Aortic valve is structurally normal.  Pulmonic valve could not be well visualized.  No evidence for tricuspid or aortic regurgitation is seen by Doppler study.  Biatrial and biventricular enlargement is present.  Diffuse left ventricular hypocontractility with ejection fraction of 30%.  Atrial septum is intact.  Aorta is normal.  No pericardial effusion or intracardiac thrombus is seen.    Impression  Mild mitral regurgitation.  Significant biatrial and biventricular enlargement.  Diffuse left ventricular hypocontractility with ejection fraction of 30%.  No pericardial effusion or intracardiac thrombus is seen.          STRESS MYOVIEW:    Cardiolite (Tc-99m Sestamibi) stress test    CARDIAC CATHETERIZATION:    Cardiac catheterization 5/24/2021 revealed  Left ventricle angiogram was not performed due to pre-existing renal dysfunction.  Left main coronary artery normal.  Left anterior descending artery has mid segment lengthy 90% disease within the previously placed stent.  Distal  left into descending artery has diffuse disease.  Circumflex coronary artery is totally occluded.  (Chronic)  Right coronary artery is chronically occluded.  SVG to marginal branch (jump graft to OM1 and OM 2) is totally occluded (new finding compared to 2015.  SVG to PDA has distal radiolucency.  However no definite obstructive disease is present.             OTHER:         Assessment/Plan     Principal Problem:    Atrial fibrillation with RVR (CMS/HCC)  Active Problems:    S/P CABG (coronary artery bypass graft)    Essential hypertension    Elevated troponin    ANNETTE treated with BiPAP    Mixed hyperlipidemia    Flaccid hemiplegia of right dominant side as late effect of cerebral infarction (CMS/HCC)    Diabetic neuropathy (CMS/HCC)    Unspecified dementia with behavioral disturbance (CMS/HCC)    Coronary artery disease    Chronic venous hypertension (idiopathic) with ulcer and inflammation of bilateral lower extremity (CMS/HCC)    Chronic obstructive pulmonary disease, unspecified (CMS/HCC)    Chronic foot ulcer (CMS/HCC)    CKD (chronic kidney disease) stage 3, GFR 30-59 ml/min (CMS/HCC)    Tobacco use disorder    Acute on chronic systolic heart failure (CMS/HCC)    Anemia of renal disease    Assessment:    Atrial Fibrillation RVR  GARSIA  HFrEF  Ischemic Cardiomyopathy EF 35%  CAD/Remote CABG:  Post CABG PCI  DM  HTN  ANNETTE  CKD  Anemia:    Plan:  Continue current Rx and supportive care.   Cr 2.35 (2.19) 1.9 on admission  Hgb 8.1 yesterday  Ventricular rates stable, Remains on Coreg, Amio, Cardizem  HR in 70's, no suzanne our significant pauses noted  OK for transfer to rehab from cards standpoint.  Awaiting precert   Anticoagulated with Apixaban  OP f/u recommended with Dr. mR in 2 weeks    Greater than 35 minutes total of face-to-face/floor  time was spent with the patient and family and nursing coordinating plan and patient management.  Of which counseling of patient with regard specifically to  her clinical  improvement comprised 50% of this total time.

## 2021-08-07 NOTE — PLAN OF CARE
Problem: Arrhythmia/Dysrhythmia  Goal: Normalized Cardiac Rhythm  Outcome: Ongoing, Progressing     Problem: Fall Injury Risk  Goal: Absence of Fall and Fall-Related Injury  Outcome: Ongoing, Progressing  Intervention: Identify and Manage Contributors to Fall Injury Risk  Recent Flowsheet Documentation  Taken 8/6/2021 2151 by Jhon Figueroa RN  Medication Review/Management: medications reviewed  Intervention: Promote Injury-Free Environment  Recent Flowsheet Documentation  Taken 8/6/2021 2151 by Jhon Figueroa RN  Safety Promotion/Fall Prevention:   activity supervised   assistive device/personal items within reach   clutter free environment maintained   fall prevention program maintained   room organization consistent   safety round/check completed     Problem: Adult Inpatient Plan of Care  Goal: Plan of Care Review  Outcome: Ongoing, Progressing  Goal: Patient-Specific Goal (Individualized)  Outcome: Ongoing, Progressing  Goal: Absence of Hospital-Acquired Illness or Injury  Outcome: Ongoing, Progressing  Intervention: Identify and Manage Fall Risk  Recent Flowsheet Documentation  Taken 8/6/2021 2151 by Jhon Figueroa RN  Safety Promotion/Fall Prevention:   activity supervised   assistive device/personal items within reach   clutter free environment maintained   fall prevention program maintained   room organization consistent   safety round/check completed  Intervention: Prevent Skin Injury  Recent Flowsheet Documentation  Taken 8/6/2021 2151 by Jhon Figueroa RN  Skin Protection:   adhesive use limited   skin-to-device areas padded   skin-to-skin areas padded  Goal: Optimal Comfort and Wellbeing  Outcome: Ongoing, Progressing  Intervention: Provide Person-Centered Care  Recent Flowsheet Documentation  Taken 8/6/2021 2151 by Jhon Figueroa RN  Trust Relationship/Rapport: care explained  Goal: Readiness for Transition of Care  Outcome: Ongoing, Progressing     Problem: Skin Injury Risk Increased  Goal: Skin  Health and Integrity  Outcome: Ongoing, Progressing  Intervention: Optimize Skin Protection  Recent Flowsheet Documentation  Taken 8/6/2021 2151 by Jhon Figueroa RN  Pressure Reduction Techniques: frequent weight shift encouraged  Pressure Reduction Devices:   specialty bed utilized   pressure-redistributing mattress utilized  Skin Protection:   adhesive use limited   skin-to-device areas padded   skin-to-skin areas padded   Goal Outcome Evaluation:

## 2021-08-07 NOTE — PLAN OF CARE
Goal Outcome Evaluation:   Patient remains a falls risk and is being turned Q2. Vital signs are stable and patient has no complaints of pain. He wears CPAP at night and 2L NC when not sleeping.       Problem: Arrhythmia/Dysrhythmia  Goal: Normalized Cardiac Rhythm  Outcome: Ongoing, Progressing     Problem: Fall Injury Risk  Goal: Absence of Fall and Fall-Related Injury  Outcome: Ongoing, Progressing  Intervention: Identify and Manage Contributors to Fall Injury Risk  Recent Flowsheet Documentation  Taken 8/7/2021 0400 by Sindhu Rey RN  Medication Review/Management: medications reviewed  Taken 8/7/2021 0200 by Sindhu Rey RN  Medication Review/Management: medications reviewed  Taken 8/7/2021 0000 by Sindhu Rey RN  Medication Review/Management: medications reviewed  Taken 8/6/2021 2258 by Sindhu Rey RN  Medication Review/Management: medications reviewed  Intervention: Promote Injury-Free Environment  Recent Flowsheet Documentation  Taken 8/7/2021 0400 by Sindhu Rey RN  Safety Promotion/Fall Prevention:   safety round/check completed   activity supervised   assistive device/personal items within reach   clutter free environment maintained   fall prevention program maintained   room organization consistent  Taken 8/7/2021 0200 by Sindhu Rey RN  Safety Promotion/Fall Prevention:   safety round/check completed   activity supervised   assistive device/personal items within reach   clutter free environment maintained   fall prevention program maintained   room organization consistent   nonskid shoes/slippers when out of bed  Taken 8/7/2021 0000 by Sindhu Rey RN  Safety Promotion/Fall Prevention:   safety round/check completed   activity supervised   assistive device/personal items within reach   clutter free environment maintained   fall prevention program maintained   nonskid shoes/slippers when out of bed   room organization consistent  Taken 8/6/2021 2258 by Krissy  LOKESH Manley  Safety Promotion/Fall Prevention:   safety round/check completed   activity supervised   assistive device/personal items within reach   clutter free environment maintained   fall prevention program maintained   room organization consistent   nonskid shoes/slippers when out of bed     Problem: Adult Inpatient Plan of Care  Goal: Plan of Care Review  Outcome: Ongoing, Progressing  Goal: Patient-Specific Goal (Individualized)  Outcome: Ongoing, Progressing  Goal: Absence of Hospital-Acquired Illness or Injury  Outcome: Ongoing, Progressing  Intervention: Identify and Manage Fall Risk  Recent Flowsheet Documentation  Taken 8/7/2021 0400 by Sindhu Rey RN  Safety Promotion/Fall Prevention:   safety round/check completed   activity supervised   assistive device/personal items within reach   clutter free environment maintained   fall prevention program maintained   room organization consistent  Taken 8/7/2021 0200 by Sindhu Rey RN  Safety Promotion/Fall Prevention:   safety round/check completed   activity supervised   assistive device/personal items within reach   clutter free environment maintained   fall prevention program maintained   room organization consistent   nonskid shoes/slippers when out of bed  Taken 8/7/2021 0000 by Sindhu Rey RN  Safety Promotion/Fall Prevention:   safety round/check completed   activity supervised   assistive device/personal items within reach   clutter free environment maintained   fall prevention program maintained   nonskid shoes/slippers when out of bed   room organization consistent  Taken 8/6/2021 2258 by Sindhu Rey RN  Safety Promotion/Fall Prevention:   safety round/check completed   activity supervised   assistive device/personal items within reach   clutter free environment maintained   fall prevention program maintained   room organization consistent   nonskid shoes/slippers when out of bed  Intervention: Prevent Skin Injury  Recent  Flowsheet Documentation  Taken 8/7/2021 0400 by Sindhu Rey RN  Skin Protection: adhesive use limited  Taken 8/6/2021 2258 by Sindhu Rey RN  Skin Protection: adhesive use limited  Goal: Optimal Comfort and Wellbeing  Outcome: Ongoing, Progressing  Intervention: Provide Person-Centered Care  Recent Flowsheet Documentation  Taken 8/7/2021 0400 by Sindhu Rey RN  Trust Relationship/Rapport: care explained  Taken 8/7/2021 0000 by Sindhu Rey RN  Trust Relationship/Rapport: care explained  Taken 8/6/2021 2258 by Sindhu Rey RN  Trust Relationship/Rapport: care explained  Goal: Readiness for Transition of Care  Outcome: Ongoing, Progressing     Problem: Skin Injury Risk Increased  Goal: Skin Health and Integrity  Outcome: Ongoing, Progressing  Intervention: Optimize Skin Protection  Recent Flowsheet Documentation  Taken 8/7/2021 0400 by Sindhu Rey RN  Pressure Reduction Techniques: frequent weight shift encouraged  Pressure Reduction Devices: pressure-redistributing mattress utilized  Skin Protection: adhesive use limited  Taken 8/6/2021 2258 by Sindhu Rey RN  Pressure Reduction Techniques: frequent weight shift encouraged  Pressure Reduction Devices: pressure-redistributing mattress utilized  Skin Protection: adhesive use limited

## 2021-08-07 NOTE — PROGRESS NOTES
[facility] Medicine Services Daily Progress Note    Patient Name: Benson Mclean  : 1950  MRN: [id number]  Primary Care Physician:  Samantha Zabala APRN  Date of admission: 8/3/2021      Subjective      Chief Complaint: Palpitations      Patient Reports Benson Mclean is a 70 y.o. male w/PMH of CHF, CKD, DM, HTN, HLD, CAD, COPD, A. fib, CABG, ANNETTE, CVA, neuropathy, obesity, depression, COVID-19 who presents to [facility] ED with palpitations, patient was discharged from this facility yesterday after a 12-day admission.  Patient states tachycardia progressively worsened after discharge from this facility yesterday, no aggravating or alleviating factors noted.  Patient admits to chest pain, nausea, weakness, palpitations, edema, dyspnea.  Patient denies fever, chills, nausea, vomiting.  As tachycardia did not improve he decided to go to [facility] ED.        Upon arrival to the ED vitals temp 97, HR 93, RR 22, /75, O2 sat 97% on 4 L nasal cannula.  Labs notable for troponin 0.117, proBNP 7648, glucose 198, , K4.5, CO2 32, creatinine 1.91, BUN 68, GFR 35, WBC 7.4, Hgb 8.0, platelets 334, neutrophils 65.9.  EKG shows tachycardia rate 126 repolarization abnormality suggest ischemia diffuse leads.  Chest x-ray shows stable cardiomegaly with pulmonary interstitial edema pattern trace bilateral pleural effusions.  Patient treated in the ED with 10 mg IV Cardizem, IV Cardizem drip initiated.    Readmitted from [facility] after having been discharged 24 hours ago. 21, 3:42 PM EDT     Patient much improved.  Heart rate controlled.  No significant complaints 21, 8:38 PM EDT    After I made the discharge yesterday I was told patient needs a pre-CERT.  Hence discharge canceled 21, 5:06 PM EDT          ROS Constitutional: Negative. Negative for chills and fever.   HENT: Negative.    Eyes: Negative.    Cardiovascular: Resolved chest  pain, dyspnea on exertion, irregular heartbeat, leg swelling and palpitations.   Respiratory: Resolved shortness of breath. Negative for cough.    Endocrine: Negative.    Hematologic/Lymphatic: Negative.    Skin: Negative.    Musculoskeletal: Positive for arthritis.   Gastrointestinal: Negative.  Negative for nausea and vomiting.   Genitourinary: Negative.    Neurological: Negative.    Psychiatric/Behavioral: Negative.    Allergic/Immunologic: Negative.    All other systems reviewed and are negative.  Noted        Objective      Vitals:   Temp:  [97.8 °F (36.6 °C)-98.3 °F (36.8 °C)] 97.9 °F (36.6 °C)  Heart Rate:  [57-70] 58  Resp:  [12-18] 18  BP: (110-133)/(45-61) 126/49  Flow (L/min):  [3-4] 3    Physical Exam  Vitals and nursing note reviewed.   Constitutional:       General: He is not in acute distress.     Appearance: Normal appearance. He is well-developed. He is not ill-appearing, toxic-appearing or diaphoretic.   HENT:      Head: Normocephalic and atraumatic.      Right Ear: Ear canal and external ear normal.      Left Ear: Ear canal and external ear normal.      Nose: Nose normal. No congestion or rhinorrhea.      Mouth/Throat:      Mouth: Mucous membranes are moist.      Pharynx: No oropharyngeal exudate.   Eyes:      General: No scleral icterus.        Right eye: No discharge.         Left eye: No discharge.      Extraocular Movements: Extraocular movements intact.      Conjunctiva/sclera: Conjunctivae normal.      Pupils: Pupils are equal, round, and reactive to light.   Neck:      Thyroid: No thyromegaly.      Vascular: No carotid bruit or JVD.      Trachea: No tracheal deviation.   Cardiovascular:      Rate and Rhythm: Normal rate. Rhythm irregular.      Heart sounds: Normal heart sounds. No murmur heard.   No friction rub. No gallop.       Comments: Absent pedal pulses  Pulmonary:      Effort: Pulmonary effort is normal. No respiratory distress.      Breath sounds: Normal breath sounds. No stridor.  No wheezing, rhonchi or rales.   Chest:      Chest wall: No tenderness.   Abdominal:      General: Bowel sounds are normal. There is no distension.      Palpations: Abdomen is soft. There is no mass.      Tenderness: There is no abdominal tenderness. There is no guarding or rebound.      Hernia: No hernia is present.   Musculoskeletal:         General: No swelling, tenderness, deformity or signs of injury. Normal range of motion.      Cervical back: Normal range of motion and neck supple. No rigidity. No muscular tenderness.      Right lower leg: No edema.      Left lower leg: No edema.   Lymphadenopathy:      Cervical: No cervical adenopathy.   Skin:     General: Skin is warm and dry.      Coloration: Skin is not jaundiced or pale.      Findings: No bruising, erythema or rash.   Neurological:      General: No focal deficit present.      Mental Status: He is alert and oriented to person, place, and time. Mental status is at baseline.      Cranial Nerves: No cranial nerve deficit.      Sensory: No sensory deficit.      Motor: No weakness or abnormal muscle tone.      Coordination: Coordination normal.   Psychiatric:         Mood and Affect: Mood normal.         Behavior: Behavior normal.         Thought Content: Thought content normal.         Judgment: Judgment normal.        Reviewed, no change in above data from the prior day.      Result Review    Result Review:  I have personally reviewed the results from the time of this admission to 8/7/2021 17:06 EDT and agree with these findings:  [x]  Laboratory  []  Microbiology  [x]  Radiology  []  EKG/Telemetry   []  Cardiology/Vascular   []  Pathology  [x]  Old records  []  Other:   Most notable findings include: Reviewed patient's discharge summary from the last visit 24 hours ago.  That time he was here for the dobutamine drip and systolic CHF.  Labs revealed elevated creatinine of 1.9 normocytic anemia with hemoglobin of 7.1.  Elevated troponin of 0.4.  Decreased  iron levels.  BNP of more than 7000    Renal function maintained.  Hemoglobin 7. 08/05/21, 8:40 PM EDT    Creatinine 2.35 nephrology on the case.          Assessment/Plan      Brief Patient Summary:  Benson Mclean is a 70 y.o. male who       amiodarone, 200 mg, Oral, Q12H  apixaban, 5 mg, Oral, Q12H  aspirin, 81 mg, Oral, Daily  atorvastatin, 40 mg, Oral, Nightly  carvedilol, 6.25 mg, Oral, BID With Meals  cholecalciferol, 1,000 Units, Oral, Daily  dilTIAZem, 10 mg, Intravenous, Once  dilTIAZem CD, 240 mg, Oral, Q24H  donepezil, 10 mg, Oral, Nightly  DULoxetine, 60 mg, Oral, Daily  ferric gluconate, 250 mg, Intravenous, Daily  furosemide, 80 mg, Oral, BID  gabapentin, 200 mg, Oral, Q12H  hydrALAZINE, 50 mg, Oral, Q12H  insulin lispro, 0-9 Units, Subcutaneous, TID AC  insulin NPH-insulin regular, 30 Units, Subcutaneous, BID With Meals  ipratropium-albuterol, 3 mL, Nebulization, 4x Daily - RT  isosorbide mononitrate, 60 mg, Oral, BID - Nitrates  sodium chloride, 10 mL, Intravenous, Q12H  spironolactone, 25 mg, Oral, Daily  vitamin B-12, 1,000 mcg, Oral, Daily             Active Hospital Problems:  Active Hospital Problems    Diagnosis    • **Atrial fibrillation with RVR (CMS/[facility])    • Acute on chronic systolic heart failure (CMS/[facility])    • Anemia of renal disease    • Tobacco use disorder    • CKD (chronic kidney disease) stage 3, GFR 30-59 ml/min (CMS/[facility])    • Mixed hyperlipidemia    • Chronic obstructive pulmonary disease, unspecified (CMS/[facility])    • Flaccid hemiplegia of right dominant side as late effect of cerebral infarction (CMS/[facility])    • Unspecified dementia with behavioral disturbance (CMS/[facility])    • Elevated troponin    • Essential hypertension    • S/P CABG (coronary artery bypass graft)    • Chronic venous hypertension (idiopathic) with ulcer and inflammation of bilateral lower extremity (CMS/HCC)    • Chronic foot ulcer (CMS/[facility])    • Coronary artery disease    • ANNETTE treated with BiPAP    • Diabetic  neuropathy (CMS/HCC)      Plan:     Atrial fibrillation:   Rate control  Beta-blockers  Calcium channel blockers  Digoxin  +/-Amiodarone  Anticoagulation per CHADS VASC2 SCORE  Close monitoring      Smoking:  Counseled to quit smoking  Long-term compliance is suspect  Nicotine replacement while in the hospital      Renal failure/insufficiency/injury:  Hydration  Supportive treatment  Cut down or hold ACE inhibitors  Avoid nephrotoxic medications   Address diuretics        Hyperlipidemia:  Check lipid panel  Statins if indicated  Add Ezetimibe if appropriate  Only Icosapent Ethyl (omega-3) demonstrated efficacy in Hypertriglyceridemia. (STRENGTH, REDUCE IT trials)    His prognosis remains poor due to above-mentioned comorbidities.  And he remains at high risk for readmission whenever he is discharged.  I would suggest torsemide on discharge instead of furosemide due to poor bioavailability of the latter.    Anemia-transfuse  Diuril dose after transfusion    Heart rate controlled.  Shall review with nephrology regarding discharge plan    Continue current.  Await placement    DVT prophylaxis:  Medical DVT prophylaxis orders are present.    CODE STATUS:    Code Status: CPR  Medical Interventions (Level of Support Prior to Arrest): Full      Disposition:  I expect patient to be discharged after stabilization of his CHF and heart rate.    This patient has been examined wearing appropriate Personal Protective [id number]/21      Electronically signed by Stanton Junior MD, 08/07/21, 17:06 EDT.  [facility] Hospitalist Team

## 2021-08-07 NOTE — PLAN OF CARE
Goal Outcome Evaluation:  Pt A&Ox3, v/s stable, call light in reach, RN will cont to monitor.

## 2021-08-08 LAB
GLUCOSE BLDC GLUCOMTR-MCNC: 126 MG/DL (ref 70–105)
GLUCOSE BLDC GLUCOMTR-MCNC: 137 MG/DL (ref 70–105)
GLUCOSE BLDC GLUCOMTR-MCNC: 190 MG/DL (ref 70–105)
GLUCOSE BLDC GLUCOMTR-MCNC: 92 MG/DL (ref 70–105)

## 2021-08-08 PROCEDURE — 94760 N-INVAS EAR/PLS OXIMETRY 1: CPT

## 2021-08-08 PROCEDURE — 99232 SBSQ HOSP IP/OBS MODERATE 35: CPT | Performed by: INTERNAL MEDICINE

## 2021-08-08 PROCEDURE — 94799 UNLISTED PULMONARY SVC/PX: CPT

## 2021-08-08 PROCEDURE — 82962 GLUCOSE BLOOD TEST: CPT

## 2021-08-08 PROCEDURE — 63710000001 INSULIN LISPRO (HUMAN) PER 5 UNITS: Performed by: INTERNAL MEDICINE

## 2021-08-08 PROCEDURE — 63710000001 INSULIN ISOPHANE & REGULAR PER 5 UNITS: Performed by: INTERNAL MEDICINE

## 2021-08-08 PROCEDURE — 99233 SBSQ HOSP IP/OBS HIGH 50: CPT | Performed by: INTERNAL MEDICINE

## 2021-08-08 RX ORDER — TRAMADOL HYDROCHLORIDE 50 MG/1
50 TABLET ORAL EVERY 6 HOURS PRN
Status: DISCONTINUED | OUTPATIENT
Start: 2021-08-08 | End: 2021-08-10

## 2021-08-08 RX ADMIN — APIXABAN 5 MG: 5 TABLET, FILM COATED ORAL at 21:20

## 2021-08-08 RX ADMIN — ASPIRIN 81 MG: 81 TABLET, CHEWABLE ORAL at 08:31

## 2021-08-08 RX ADMIN — TRAMADOL HYDROCHLORIDE 50 MG: 50 TABLET, FILM COATED ORAL at 15:12

## 2021-08-08 RX ADMIN — ATORVASTATIN CALCIUM 40 MG: 40 TABLET, FILM COATED ORAL at 21:20

## 2021-08-08 RX ADMIN — CYANOCOBALAMIN TAB 1000 MCG 1000 MCG: 1000 TAB at 08:31

## 2021-08-08 RX ADMIN — IPRATROPIUM BROMIDE AND ALBUTEROL SULFATE 3 ML: 2.5; .5 SOLUTION RESPIRATORY (INHALATION) at 19:50

## 2021-08-08 RX ADMIN — DULOXETINE 60 MG: 30 CAPSULE, DELAYED RELEASE ORAL at 08:31

## 2021-08-08 RX ADMIN — GABAPENTIN 200 MG: 100 CAPSULE ORAL at 21:20

## 2021-08-08 RX ADMIN — SPIRONOLACTONE 25 MG: 25 TABLET ORAL at 08:31

## 2021-08-08 RX ADMIN — INSULIN HUMAN 30 UNITS: 100 INJECTION, SUSPENSION SUBCUTANEOUS at 08:30

## 2021-08-08 RX ADMIN — Medication 10 ML: at 08:30

## 2021-08-08 RX ADMIN — DONEPEZIL HYDROCHLORIDE 10 MG: 5 TABLET, FILM COATED ORAL at 21:20

## 2021-08-08 RX ADMIN — AMIODARONE HYDROCHLORIDE 200 MG: 200 TABLET ORAL at 10:42

## 2021-08-08 RX ADMIN — Medication 1000 UNITS: at 08:30

## 2021-08-08 RX ADMIN — Medication 10 ML: at 21:20

## 2021-08-08 RX ADMIN — INSULIN LISPRO 2 UNITS: 100 INJECTION, SOLUTION INTRAVENOUS; SUBCUTANEOUS at 12:15

## 2021-08-08 RX ADMIN — TRAMADOL HYDROCHLORIDE 50 MG: 50 TABLET, FILM COATED ORAL at 21:20

## 2021-08-08 RX ADMIN — IPRATROPIUM BROMIDE AND ALBUTEROL SULFATE 3 ML: 2.5; .5 SOLUTION RESPIRATORY (INHALATION) at 07:47

## 2021-08-08 RX ADMIN — INSULIN HUMAN 30 UNITS: 100 INJECTION, SUSPENSION SUBCUTANEOUS at 17:44

## 2021-08-08 RX ADMIN — APIXABAN 5 MG: 5 TABLET, FILM COATED ORAL at 08:31

## 2021-08-08 RX ADMIN — ACETAMINOPHEN 650 MG: 325 TABLET ORAL at 08:33

## 2021-08-08 RX ADMIN — GABAPENTIN 200 MG: 100 CAPSULE ORAL at 08:31

## 2021-08-08 RX ADMIN — IPRATROPIUM BROMIDE AND ALBUTEROL SULFATE 3 ML: 2.5; .5 SOLUTION RESPIRATORY (INHALATION) at 15:25

## 2021-08-08 RX ADMIN — DILTIAZEM HYDROCHLORIDE 240 MG: 240 CAPSULE, COATED, EXTENDED RELEASE ORAL at 08:33

## 2021-08-08 RX ADMIN — FUROSEMIDE 80 MG: 40 TABLET ORAL at 17:44

## 2021-08-08 RX ADMIN — FUROSEMIDE 80 MG: 40 TABLET ORAL at 08:31

## 2021-08-08 NOTE — PROGRESS NOTES
[facility] Medicine Services Daily Progress Note    Patient Name: Benson Mclean  : 1950  MRN: [id number]  Primary Care Physician:  Samantha Zabala APRN  Date of admission: 8/3/2021      Subjective      Chief Complaint: Palpitations      Patient Reports Benson Mclean is a 70 y.o. male w/PMH of CHF, CKD, DM, HTN, HLD, CAD, COPD, A. fib, CABG, ANNETTE, CVA, neuropathy, obesity, depression, COVID-19 who presents to [facility] ED with palpitations, patient was discharged from this facility yesterday after a 12-day admission.  Patient states tachycardia progressively worsened after discharge from this facility yesterday, no aggravating or alleviating factors noted.  Patient admits to chest pain, nausea, weakness, palpitations, edema, dyspnea.  Patient denies fever, chills, nausea, vomiting.  As tachycardia did not improve he decided to go to [facility] ED.        Upon arrival to the ED vitals temp 97, HR 93, RR 22, /75, O2 sat 97% on 4 L nasal cannula.  Labs notable for troponin 0.117, proBNP 7648, glucose 198, , K4.5, CO2 32, creatinine 1.91, BUN 68, GFR 35, WBC 7.4, Hgb 8.0, platelets 334, neutrophils 65.9.  EKG shows tachycardia rate 126 repolarization abnormality suggest ischemia diffuse leads.  Chest x-ray shows stable cardiomegaly with pulmonary interstitial edema pattern trace bilateral pleural effusions.  Patient treated in the ED with 10 mg IV Cardizem, IV Cardizem drip initiated.    Readmitted from [facility] after having been discharged 24 hours ago. 21, 3:42 PM EDT     Patient much improved.  Heart rate controlled.  No significant complaints 21, 8:38 PM EDT    After I made the discharge yesterday I was told patient needs a pre-CERT.  Hence discharge canceled 21, 5:06 PM EDT    No new complaints. 21, 2:39 PM EDT        ROS Constitutional: Negative. Negative for chills and fever.   HENT: Negative.    Eyes:  Negative.    Cardiovascular: Resolved chest pain, dyspnea on exertion, irregular heartbeat, leg swelling and palpitations.   Respiratory: Resolved shortness of breath. Negative for cough.    Endocrine: Negative.    Hematologic/Lymphatic: Negative.    Skin: Negative.    Musculoskeletal: Positive for arthritis.   Gastrointestinal: Negative.  Negative for nausea and vomiting.   Genitourinary: Negative.    Neurological: Negative.    Psychiatric/Behavioral: Negative.    Allergic/Immunologic: Negative.    All other systems reviewed and are negative.  Reviewed, no change in above data from the prior day.        Objective      Vitals:   Temp:  [97.6 °F (36.4 °C)-98.5 °F (36.9 °C)] 97.9 °F (36.6 °C)  Heart Rate:  [57-88] 58  Resp:  [14-18] 14  BP: ()/(48-58) 116/53  Flow (L/min):  [2-6] 2    Physical Exam  Vitals and nursing note reviewed.   Constitutional:       General: He is not in acute distress.     Appearance: Normal appearance. He is well-developed. He is not ill-appearing, toxic-appearing or diaphoretic.   HENT:      Head: Normocephalic and atraumatic.      Right Ear: Ear canal and external ear normal.      Left Ear: Ear canal and external ear normal.      Nose: Nose normal. No congestion or rhinorrhea.      Mouth/Throat:      Mouth: Mucous membranes are moist.      Pharynx: No oropharyngeal exudate.   Eyes:      General: No scleral icterus.        Right eye: No discharge.         Left eye: No discharge.      Extraocular Movements: Extraocular movements intact.      Conjunctiva/sclera: Conjunctivae normal.      Pupils: Pupils are equal, round, and reactive to light.   Neck:      Thyroid: No thyromegaly.      Vascular: No carotid bruit or JVD.      Trachea: No tracheal deviation.   Cardiovascular:      Rate and Rhythm: Normal rate. Rhythm irregular.      Heart sounds: Normal heart sounds. No murmur heard.   No friction rub. No gallop.       Comments: Absent pedal pulses  Pulmonary:      Effort: Pulmonary effort  is normal. No respiratory distress.      Breath sounds: Normal breath sounds. No stridor. No wheezing, rhonchi or rales.   Chest:      Chest wall: No tenderness.   Abdominal:      General: Bowel sounds are normal. There is no distension.      Palpations: Abdomen is soft. There is no mass.      Tenderness: There is no abdominal tenderness. There is no guarding or rebound.      Hernia: No hernia is present.   Musculoskeletal:         General: No swelling, tenderness, deformity or signs of injury. Normal range of motion.      Cervical back: Normal range of motion and neck supple. No rigidity. No muscular tenderness.      Right lower leg: No edema.      Left lower leg: No edema.   Lymphadenopathy:      Cervical: No cervical adenopathy.   Skin:     General: Skin is warm and dry.      Coloration: Skin is not jaundiced or pale.      Findings: No bruising, erythema or rash.   Neurological:      General: No focal deficit present.      Mental Status: He is alert and oriented to person, place, and time. Mental status is at baseline.      Cranial Nerves: No cranial nerve deficit.      Sensory: No sensory deficit.      Motor: No weakness or abnormal muscle tone.      Coordination: Coordination normal.   Psychiatric:         Mood and Affect: Mood normal.         Behavior: Behavior normal.         Thought Content: Thought content normal.         Judgment: Judgment normal.        Noted      Result Review    Result Review:  I have personally reviewed the results from the time of this admission to 8/8/2021 14:38 EDT and agree with these findings:  [x]  Laboratory  []  Microbiology  [x]  Radiology  []  EKG/Telemetry   []  Cardiology/Vascular   []  Pathology  [x]  Old records  []  Other:   Most notable findings include: Reviewed patient's discharge summary from the last visit 24 hours ago.  That time he was here for the dobutamine drip and systolic CHF.  Labs revealed elevated creatinine of 1.9 normocytic anemia with hemoglobin of 7.1.   Elevated troponin of 0.4.  Decreased iron levels.  BNP of more than 7000    Renal function maintained.  Hemoglobin 7. 08/05/21, 8:40 PM EDT    Creatinine 2.35 nephrology on the case.          Assessment/Plan      Brief Patient Summary:  Benson Mclean is a 70 y.o. male who       amiodarone, 200 mg, Oral, Q12H  apixaban, 5 mg, Oral, Q12H  aspirin, 81 mg, Oral, Daily  atorvastatin, 40 mg, Oral, Nightly  carvedilol, 6.25 mg, Oral, BID With Meals  cholecalciferol, 1,000 Units, Oral, Daily  dilTIAZem, 10 mg, Intravenous, Once  dilTIAZem CD, 240 mg, Oral, Q24H  donepezil, 10 mg, Oral, Nightly  DULoxetine, 60 mg, Oral, Daily  furosemide, 80 mg, Oral, BID  gabapentin, 200 mg, Oral, Q12H  hydrALAZINE, 50 mg, Oral, Q12H  insulin lispro, 0-9 Units, Subcutaneous, TID AC  insulin NPH-insulin regular, 30 Units, Subcutaneous, BID With Meals  ipratropium-albuterol, 3 mL, Nebulization, 4x Daily - RT  isosorbide mononitrate, 60 mg, Oral, BID - Nitrates  sodium chloride, 10 mL, Intravenous, Q12H  spironolactone, 25 mg, Oral, Daily  vitamin B-12, 1,000 mcg, Oral, Daily             Active Hospital Problems:  Active Hospital Problems    Diagnosis    • **Atrial fibrillation with RVR (CMS/[facility])    • Acute on chronic systolic heart failure (CMS/[facility])    • Anemia of renal disease    • Tobacco use disorder    • CKD (chronic kidney disease) stage 3, GFR 30-59 ml/min (CMS/[facility])    • Mixed hyperlipidemia    • Chronic obstructive pulmonary disease, unspecified (CMS/[facility])    • Flaccid hemiplegia of right dominant side as late effect of cerebral infarction (CMS/[facility])    • Unspecified dementia with behavioral disturbance (CMS/[facility])    • Elevated troponin    • Essential hypertension    • S/P CABG (coronary artery bypass graft)    • Chronic venous hypertension (idiopathic) with ulcer and inflammation of bilateral lower extremity (CMS/HCC)    • Chronic foot ulcer (CMS/[facility])    • Coronary artery disease    • ANNETTE treated with BiPAP    • Diabetic neuropathy  (CMS/[facility])      Plan:     Atrial fibrillation:   Rate control  Beta-blockers  Calcium channel blockers  Digoxin  +/-Amiodarone  Anticoagulation per CHADS VASC2 SCORE  Close monitoring      Smoking:  Counseled to quit smoking  Long-term compliance is suspect  Nicotine replacement while in the hospital      Renal failure/insufficiency/injury:  Hydration  Supportive treatment  Cut down or hold ACE inhibitors  Avoid nephrotoxic medications   Address diuretics        Hyperlipidemia:  Check lipid panel  Statins if indicated  Add Ezetimibe if appropriate  Only Icosapent Ethyl (omega-3) demonstrated efficacy in Hypertriglyceridemia. (STRENGTH, REDUCE IT trials)    His prognosis remains poor due to above-mentioned comorbidities.  And he remains at high risk for readmission whenever he is discharged.  I would suggest torsemide on discharge instead of furosemide due to poor bioavailability of the latter.    Anemia-transfuse  Diuril dose after transfusion    Heart rate controlled.  Shall review with nephrology regarding discharge plan    Continue current.  Await placement    Add tramadol for pain. Otherwise waiting on placement    DVT prophylaxis:  Medical DVT prophylaxis orders are present.    CODE STATUS:    Code Status: CPR  Medical Interventions (Level of Support Prior to Arrest): Full      Disposition:  I expect patient to be discharged after stabilization of his CHF and heart rate.    This patient has been examined wearing appropriate Personal Protective [id number]/21      Electronically signed by Stanton Junior MD, 08/08/21, 14:38 EDT.  [facility], operated by [facility] Hospitalist Team

## 2021-08-08 NOTE — PROGRESS NOTES
Referring Provider: Stanton Junior MD    Reason for follow-up:  Atrial fibrillation  Cardiomyopathy  Shortness of breath     Patient Care Team:  Samantha Zabala APRN as PCP - General  Samantha Zabala APRN as PCP - Family Medicine  Jose G Rm MD as Consulting Physician (Cardiology)    Subjective .  Feeling better, Denies chest pain or dyspnea  C/o back pain     ROS    Review of Symptoms:  Constitutional: Patient afebrile no chills or unexpected weight changes  Respiratory: No cough, no wheezing or dyspnea  Cardiovascular: Today the patient complains of no chest pain, palpitations, dyspnea, orthopnea and no edema  Gastrointestinal: No nausea, vomiting, constipation or diarrhea.  No melena or dark stools    All other systems reviewed and are negative      History  Past Medical History:   Diagnosis Date   • Appetite loss    • Brain bleed (CMS/HCC)    • Carpal tunnel syndrome on left     carpal tunnel on left hand   • CHF (congestive heart failure) (CMS/HCC)    • Diabetic neuropathy (CMS/HCC)    • DM2 (diabetes mellitus, type 2) (CMS/HCC)    • History of angina    • Hyperlipidemia    • Hypogonadism in male    • Obesity    • Sleep apnea    • Stroke (CMS/HCC)    • Unsteady gait        Past Surgical History:   Procedure Laterality Date   • ANGIOPLASTY      X2   • APPENDECTOMY     • CARDIAC CATHETERIZATION  11/13/2015   • CARDIAC CATHETERIZATION N/A 5/24/2021    Procedure: Left Heart Cath and coronary angiogram;  Surgeon: Jose G Rm MD;  Location: [facility] CATH INVASIVE LOCATION;  Service: Cardiovascular;  Laterality: N/A;   • CARDIAC CATHETERIZATION  5/24/2021    Procedure: Saphenous Vein Graft;  Surgeon: Jose G Rm MD;  Location: [facility] CATH INVASIVE LOCATION;  Service: Cardiovascular;;   • CARDIAC CATHETERIZATION N/A 5/24/2021    Procedure: Percutaneous Coronary Intervention;  Surgeon: Bernabe Zambrano MD;  Location: [facility] CATH INVASIVE LOCATION;  Service: Cardiology;  Laterality: N/A;   • CARDIAC  CATHETERIZATION N/A 5/24/2021    Procedure: Stent NIELS coronary;  Surgeon: Bernabe Zambrano MD;  Location:  [facility] CATH INVASIVE LOCATION;  Service: Cardiology;  Laterality: N/A;   • CARDIAC CATHETERIZATION N/A 5/26/2021    Procedure: Percutaneous Coronary Intervention;  Surgeon: Bernabe Zambrano MD;  Location:  [facility] CATH INVASIVE LOCATION;  Service: Cardiovascular;  Laterality: N/A;   • CARDIAC CATHETERIZATION N/A 5/26/2021    Procedure: Stent NIELS bypass graft;  Surgeon: Bernabe Zambrano MD;  Location:  [facility] CATH INVASIVE LOCATION;  Service: Cardiovascular;  Laterality: N/A;   • CARDIAC ELECTROPHYSIOLOGY PROCEDURE N/A 5/24/2021    Procedure: Temporary Pacemaker;  Surgeon: Bernabe Zambrano MD;  Location:  [facility] CATH INVASIVE LOCATION;  Service: Cardiology;  Laterality: N/A;   • CARDIAC ELECTROPHYSIOLOGY PROCEDURE N/A 5/26/2021    Procedure: Temporary Pacemaker;  Surgeon: Bernabe Zambrano MD;  Location: [facility] CATH INVASIVE LOCATION;  Service: Cardiovascular;  Laterality: N/A;   • CARPAL TUNNEL RELEASE Left 04/29/2018    carpal tunnel- lt hand// other hand surgeries    • CATARACT EXTRACTION, BILATERAL  2002    Dr. Lux Acosta   • CHOLECYSTECTOMY     • COLON RESECTION  2005   • CORONARY ANGIOPLASTY     • CORONARY ANGIOPLASTY WITH STENT PLACEMENT  11/13/2015    PTCA stent to proximal in stent and mid to distal lad   • CORONARY ANGIOPLASTY WITH STENT PLACEMENT  09/16/2016    PTCA stent to mid lad and stent to vein graft to marginal   • CORONARY ARTERY BYPASS GRAFT  2005    @ [facility] [place]   • CYST REMOVAL      cyst removed from scrotum   • FOOT SURGERY Right 07/17/2018   • FOOT SURGERY Left 02/04/2019   • TOE AMPUTATION Right     right toe removed D/T infected cut that went to the bone       Family History   Problem Relation Age of Onset   • Heart disease Mother    • Heart disease Father    • Diabetes Sister    • Heart disease Sister    • Diabetes Brother    • Mental illness Brother        Social History     Tobacco Use   • Smoking status:  Former Smoker     Packs/day: 1.00     Years: 60.00     Pack years: 60.00     Types: Cigarettes   • Smokeless tobacco: Never Used   • Tobacco comment: Patient quit smoking 11/2020   Vaping Use   • Vaping Use: Never used   Substance Use Topics   • Alcohol use: No   • Drug use: Yes     Frequency: 1.0 times per week     Types: Marijuana     Comment: socially        Medications Prior to Admission   Medication Sig Dispense Refill Last Dose   • albuterol sulfate  (90 Base) MCG/ACT inhaler Inhale 2 puffs Every 4 (Four) Hours As Needed.      • apixaban (ELIQUIS) 5 MG tablet tablet Take 5 mg by mouth Daily.      • aspirin 81 MG chewable tablet Chew 81 mg Daily.      • atorvastatin (LIPITOR) 40 MG tablet Take 40 mg by mouth Every Night.      • cholecalciferol (VITAMIN D3) 25 MCG (1000 UT) tablet Take 1,000 Units by mouth Daily.      • dextrose (GLUTOSE) 40 % gel Take  by mouth Every 1 (One) Hour As Needed for Low Blood Sugar.      • donepezil (ARICEPT) 10 MG tablet Take 10 mg by mouth Every Night.      • DULoxetine HCl (DRIZALMA) 60 MG capsule Take 60 mg by mouth Daily.      • gabapentin (NEURONTIN) 100 MG capsule Take 2 capsules by mouth 2 (two) times a day. 6 capsule 0    • hydrALAZINE (APRESOLINE) 50 MG tablet Take 50 mg by mouth 2 (two) times a day. Hold for SBP <110      • isosorbide mononitrate (IMDUR) 60 MG 24 hr tablet Take 60 mg by mouth 2 (Two) Times a Day.      • metoprolol succinate XL (TOPROL-XL) 100 MG 24 hr tablet Take 1 tablet by mouth Daily for 30 days. 30 tablet 0    • nitroglycerin (NITROSTAT) 0.4 MG SL tablet Place 1 tablet under the tongue Every 5 (Five) Minutes As Needed for Chest Pain (Only if SBP Greater Than 100). Take no more than 3 doses in 15 minutes. 30 tablet 12    • spironolactone (ALDACTONE) 25 MG tablet Take 1 tablet by mouth Daily for 30 days. 30 tablet 0    • tiotropium (Spiriva HandiHaler) 18 MCG per inhalation capsule Place 1 capsule into inhaler and inhale Daily. 30 capsule 1    •  "vitamin B-12 (CYANOCOBALAMIN) 1000 MCG tablet Take 1,000 mcg by mouth Daily.      • [DISCONTINUED] furosemide (LASIX) 40 MG tablet Take 1 tablet by mouth 2 (Two) Times a Day for 30 days. 60 tablet 0        Allergies  Codeine    Scheduled Meds:amiodarone, 200 mg, Oral, Q12H  apixaban, 5 mg, Oral, Q12H  aspirin, 81 mg, Oral, Daily  atorvastatin, 40 mg, Oral, Nightly  carvedilol, 6.25 mg, Oral, BID With Meals  cholecalciferol, 1,000 Units, Oral, Daily  dilTIAZem, 10 mg, Intravenous, Once  dilTIAZem CD, 240 mg, Oral, Q24H  donepezil, 10 mg, Oral, Nightly  DULoxetine, 60 mg, Oral, Daily  ferric gluconate, 250 mg, Intravenous, Daily  furosemide, 80 mg, Oral, BID  gabapentin, 200 mg, Oral, Q12H  hydrALAZINE, 50 mg, Oral, Q12H  insulin lispro, 0-9 Units, Subcutaneous, TID AC  insulin NPH-insulin regular, 30 Units, Subcutaneous, BID With Meals  ipratropium-albuterol, 3 mL, Nebulization, 4x Daily - RT  isosorbide mononitrate, 60 mg, Oral, BID - Nitrates  sodium chloride, 10 mL, Intravenous, Q12H  spironolactone, 25 mg, Oral, Daily  vitamin B-12, 1,000 mcg, Oral, Daily      Continuous Infusions:   PRN Meds:.•  acetaminophen **OR** acetaminophen **OR** acetaminophen  •  aluminum-magnesium hydroxide-simethicone  •  dextrose  •  dextrose  •  glucagon (human recombinant)  •  insulin lispro **AND** insulin lispro  •  melatonin  •  nitroglycerin  •  ondansetron **OR** ondansetron  •  [COMPLETED] Insert peripheral IV **AND** sodium chloride  •  sodium chloride    Objective     VITAL SIGNS  Vitals:    08/08/21 0154 08/08/21 0626 08/08/21 0747 08/08/21 0753   BP: 110/48 94/58     BP Location: Left arm Left arm     Patient Position: Lying Lying     Pulse: 62 68 81 88   Resp: 17 17 18 18   Temp: 97.9 °F (36.6 °C) 97.6 °F (36.4 °C)     TempSrc: Oral Oral     SpO2: 90% 97% 99% 100%   Weight:  115 kg (253 lb 1.4 oz)     Height:           Flowsheet Rows      First Filed Value   Admission Height  180.3 cm (71\") Documented at 08/03/2021 1733 "   Admission Weight  113 kg (250 lb) Documented at 08/03/2021 1733            Intake/Output Summary (Last 24 hours) at 8/8/2021 0810  Last data filed at 8/8/2021 0721  Gross per 24 hour   Intake 1332 ml   Output 1300 ml   Net 32 ml        TELEMETRY: Atrial fibrillation with controlled ventricular response.    Physical exam  Constitutional: well-nourished, and appears stated age in no acute distress  PERRL: Conjunctiva clear, no pallor, anicteric  HENMT: normocephalic, normal dentition, no cyanosis or pallor  Neck:no bruits, or thrills and bilateral normal carotid upstroke. Normal jugular venous pressure  Cardiovascular: No parasternal heaves an non-displaced focal PMI. Normal rate and rhythm: no rub, gallop, murmur or click and normal S1 and S2; no lower or upper extremity edema.   Lungs: unlabored, no wheezing with no rales or rhonchi on auscultation.  Extremities: Warm, no clubbing, cyanosis. Full and equal peripheral pulses in extremities with no bruits appreciated.   Abdomen: soft, non-tender, non-distended  Musculoskeletal: no joint tenderness or swelling and no erythema  Skin: Warm and dry, non-erythematous   Neuro:alert and normal affect. Oriented to time, place and person.       Results Review:   I reviewed the patient's new clinical results.  Lab Results (last 24 hours)     Procedure Component Value Units Date/Time    POC Glucose Once [[id number]]  (Normal) Collected: 08/08/21 0719    Specimen: Blood Updated: 08/08/21 0722     Glucose 92 mg/dL      Comment: Serial Number: [id number]:  [id number]       POC Glucose Once [[id number]]  (Abnormal) Collected: 08/07/21 2053    Specimen: Blood Updated: 08/07/21 2054     Glucose 138 mg/dL      Comment: Serial Number: [id number]:  [id number]       POC Glucose Once [[id number]]  (Abnormal) Collected: 08/07/21 1607    Specimen: Blood Updated: 08/07/21 1608     Glucose 107 mg/dL      Comment: Serial Number: [id number]:  [id number]             Imaging  Results (Last 24 Hours)     ** No results found for the last 24 hours. **      LAB RESULTS (LAST 7 DAYS)    CBC  Results from last 7 days   Lab Units 08/06/21 0625 08/05/21 0300 08/04/21 0519 08/03/21 1837   WBC 10*3/mm3 5.70 6.40 7.40 7.40   RBC 10*6/mm3 2.74* 2.45* 2.48* 2.82*   HEMOGLOBIN g/dL 8.1* 7.0* 7.1* 8.0*   HEMATOCRIT % 24.0* 21.0* 21.4* 24.5*   MCV fL 87.4 86.0 86.2 86.6   PLATELETS 10*3/mm3 307 310 312 334       BMP  Results from last 7 days   Lab Units 08/07/21 0314 08/06/21 0625 08/05/21 0300 08/04/21 0519 08/03/21 1837 08/02/21  0244   SODIUM mmol/L 138 139 141 140 139 137   POTASSIUM mmol/L 4.6 4.4 4.6 4.6 4.5 4.6   CHLORIDE mmol/L 95* 96* 98 97* 95* 97*   CO2 mmol/L 33.0* 34.0* 31.0* 33.0* 32.0* 34.0*   BUN mg/dL 67* 66* 68* 65* 68* 64*   CREATININE mg/dL 2.35* 2.19* 2.04* 1.95* 1.91* 1.95*   GLUCOSE mg/dL 139* 66 71 171* 198* 92   MAGNESIUM mg/dL  --   --  2.1  --   --   --    PHOSPHORUS mg/dL  --   --  5.1*  --   --   --        CMP   Results from last 7 days   Lab Units 08/07/21 0314 08/06/21 0625 08/05/21 0300 08/04/21 0519 08/03/21 1837 08/02/21  0244   SODIUM mmol/L 138 139 141 140 139 137   POTASSIUM mmol/L 4.6 4.4 4.6 4.6 4.5 4.6   CHLORIDE mmol/L 95* 96* 98 97* 95* 97*   CO2 mmol/L 33.0* 34.0* 31.0* 33.0* 32.0* 34.0*   BUN mg/dL 67* 66* 68* 65* 68* 64*   CREATININE mg/dL 2.35* 2.19* 2.04* 1.95* 1.91* 1.95*   GLUCOSE mg/dL 139* 66 71 171* 198* 92         BNP        TROPONIN  Results from last 7 days   Lab Units 08/04/21  0519   TROPONIN T ng/mL 0.792*       CoAg        Creatinine Clearance  Estimated Creatinine Clearance: 35.5 mL/min (A) (by C-G formula based on SCr of 2.35 mg/dL (H)).    ABG        Radiology  No radiology results for the last day            EKG              I personally viewed and interpreted the patient's EKG/Telemetry data:    ECHOCARDIOGRAM:    Results for orders placed during the hospital encounter of 07/20/21    Adult Transthoracic Echo Complete With  Contrast if Necessary Per Protocol    Interpretation Summary  · Estimated left ventricular EF = 30% Left ventricular systolic function is moderately decreased.    Occasions  Shortness of breath.    Technically satisfactory study.  Mitral valve is structurally normal.  Mild mitral regurgitation.  Tricuspid valve is structurally normal.  Aortic valve is structurally normal.  Pulmonic valve could not be well visualized.  No evidence for tricuspid or aortic regurgitation is seen by Doppler study.  Biatrial and biventricular enlargement is present.  Diffuse left ventricular hypocontractility with ejection fraction of 30%.  Atrial septum is intact.  Aorta is normal.  No pericardial effusion or intracardiac thrombus is seen.    Impression  Mild mitral regurgitation.  Significant biatrial and biventricular enlargement.  Diffuse left ventricular hypocontractility with ejection fraction of 30%.  No pericardial effusion or intracardiac thrombus is seen.          STRESS MYOVIEW:    Cardiolite (Tc-99m Sestamibi) stress test    CARDIAC CATHETERIZATION:    Cardiac catheterization 5/24/2021 revealed  Left ventricle angiogram was not performed due to pre-existing renal dysfunction.  Left main coronary artery normal.  Left anterior descending artery has mid segment lengthy 90% disease within the previously placed stent.  Distal left into descending artery has diffuse disease.  Circumflex coronary artery is totally occluded.  (Chronic)  Right coronary artery is chronically occluded.  SVG to marginal branch (jump graft to OM1 and OM 2) is totally occluded (new finding compared to 2015.  SVG to PDA has distal radiolucency.  However no definite obstructive disease is present.             OTHER:         Assessment/Plan     Principal Problem:    Atrial fibrillation with RVR (CMS/HCC)  Active Problems:    S/P CABG (coronary artery bypass graft)    Essential hypertension    Elevated troponin    ANNETTE treated with BiPAP    Mixed  hyperlipidemia    Flaccid hemiplegia of right dominant side as late effect of cerebral infarction (CMS/HCC)    Diabetic neuropathy (CMS/HCC)    Unspecified dementia with behavioral disturbance (CMS/HCC)    Coronary artery disease    Chronic venous hypertension (idiopathic) with ulcer and inflammation of bilateral lower extremity (CMS/HCC)    Chronic obstructive pulmonary disease, unspecified (CMS/HCC)    Chronic foot ulcer (CMS/HCC)    CKD (chronic kidney disease) stage 3, GFR 30-59 ml/min (CMS/HCC)    Tobacco use disorder    Acute on chronic systolic heart failure (CMS/HCC)    Anemia of renal disease    Assessment:    Atrial Fibrillation RVR: currently controlled rate  GARSIA  HFrEF  Ischemic Cardiomyopathy EF 35%  CAD/Remote CABG:  Post CABG PCI  DM  HTN  ANNETTE  CKD  Anemia:    Plan:  No new labs today  Continue current Rx and supportive care.   Cr 2.35 yesterday (2.19) 1.9 on admission  Hgb 8.1 Friday  Ventricular rates stable, Remains on Coreg, Amio, Cardizem  HR in 90's, no suzanne our significant pauses noted  OK for transfer to rehab from cards standpoint.  Awaiting precert   Anticoagulated with Apixaban  OP f/u recommended with Dr. Rm in 2 weeks    Greater than 35 minutes total of face-to-face/floor  time was spent with the patient and family and nursing coordinating plan and patient management.  Of which counseling of patient with regard specifically to active cardiac issues with need for rehab comprised 50% of this total time.

## 2021-08-08 NOTE — PLAN OF CARE
Patient A/Ox4, wearing home cpap overnight, complaint of pain treated with PRN med.  VS stable.      Goal Outcome Evaluation:  Plan of Care Reviewed With: patient  Progress: no change

## 2021-08-08 NOTE — PLAN OF CARE
Goal Outcome Evaluation:  Pt A&Ox3, v/s stable, call light in reach, RN will cont to monitor.

## 2021-08-09 LAB
GLUCOSE BLDC GLUCOMTR-MCNC: 119 MG/DL (ref 70–105)
GLUCOSE BLDC GLUCOMTR-MCNC: 137 MG/DL (ref 70–105)
GLUCOSE BLDC GLUCOMTR-MCNC: 164 MG/DL (ref 70–105)
GLUCOSE BLDC GLUCOMTR-MCNC: 52 MG/DL (ref 70–105)
GLUCOSE BLDC GLUCOMTR-MCNC: 59 MG/DL (ref 70–105)
GLUCOSE BLDC GLUCOMTR-MCNC: 75 MG/DL (ref 70–105)
GLUCOSE BLDC GLUCOMTR-MCNC: 84 MG/DL (ref 70–105)
GLUCOSE BLDC GLUCOMTR-MCNC: 94 MG/DL (ref 70–105)

## 2021-08-09 PROCEDURE — 63710000001 INSULIN LISPRO (HUMAN) PER 5 UNITS: Performed by: INTERNAL MEDICINE

## 2021-08-09 PROCEDURE — 94799 UNLISTED PULMONARY SVC/PX: CPT

## 2021-08-09 PROCEDURE — 93005 ELECTROCARDIOGRAM TRACING: CPT | Performed by: INTERNAL MEDICINE

## 2021-08-09 PROCEDURE — 97116 GAIT TRAINING THERAPY: CPT

## 2021-08-09 PROCEDURE — 99232 SBSQ HOSP IP/OBS MODERATE 35: CPT | Performed by: INTERNAL MEDICINE

## 2021-08-09 PROCEDURE — 97162 PT EVAL MOD COMPLEX 30 MIN: CPT

## 2021-08-09 PROCEDURE — 82962 GLUCOSE BLOOD TEST: CPT

## 2021-08-09 PROCEDURE — 63710000001 INSULIN ISOPHANE & REGULAR PER 5 UNITS: Performed by: INTERNAL MEDICINE

## 2021-08-09 PROCEDURE — 93010 ELECTROCARDIOGRAM REPORT: CPT | Performed by: INTERNAL MEDICINE

## 2021-08-09 RX ADMIN — ISOSORBIDE MONONITRATE 60 MG: 60 TABLET, EXTENDED RELEASE ORAL at 09:31

## 2021-08-09 RX ADMIN — APIXABAN 5 MG: 5 TABLET, FILM COATED ORAL at 21:11

## 2021-08-09 RX ADMIN — IPRATROPIUM BROMIDE AND ALBUTEROL SULFATE 3 ML: 2.5; .5 SOLUTION RESPIRATORY (INHALATION) at 11:16

## 2021-08-09 RX ADMIN — GABAPENTIN 200 MG: 100 CAPSULE ORAL at 21:11

## 2021-08-09 RX ADMIN — Medication 10 ML: at 09:31

## 2021-08-09 RX ADMIN — CARVEDILOL 6.25 MG: 6.25 TABLET, FILM COATED ORAL at 09:29

## 2021-08-09 RX ADMIN — INSULIN LISPRO 2 UNITS: 100 INJECTION, SOLUTION INTRAVENOUS; SUBCUTANEOUS at 12:47

## 2021-08-09 RX ADMIN — CYANOCOBALAMIN TAB 1000 MCG 1000 MCG: 1000 TAB at 09:27

## 2021-08-09 RX ADMIN — TRAMADOL HYDROCHLORIDE 50 MG: 50 TABLET, FILM COATED ORAL at 09:41

## 2021-08-09 RX ADMIN — GABAPENTIN 200 MG: 100 CAPSULE ORAL at 09:27

## 2021-08-09 RX ADMIN — DILTIAZEM HYDROCHLORIDE 240 MG: 240 CAPSULE, COATED, EXTENDED RELEASE ORAL at 09:28

## 2021-08-09 RX ADMIN — AMIODARONE HYDROCHLORIDE 200 MG: 200 TABLET ORAL at 09:29

## 2021-08-09 RX ADMIN — Medication 10 ML: at 21:11

## 2021-08-09 RX ADMIN — IPRATROPIUM BROMIDE AND ALBUTEROL SULFATE 3 ML: 2.5; .5 SOLUTION RESPIRATORY (INHALATION) at 18:30

## 2021-08-09 RX ADMIN — TRAMADOL HYDROCHLORIDE 50 MG: 50 TABLET, FILM COATED ORAL at 21:11

## 2021-08-09 RX ADMIN — ATORVASTATIN CALCIUM 40 MG: 40 TABLET, FILM COATED ORAL at 21:11

## 2021-08-09 RX ADMIN — ACETAMINOPHEN 650 MG: 325 TABLET ORAL at 17:13

## 2021-08-09 RX ADMIN — Medication 1000 UNITS: at 09:28

## 2021-08-09 RX ADMIN — APIXABAN 5 MG: 5 TABLET, FILM COATED ORAL at 09:29

## 2021-08-09 RX ADMIN — SPIRONOLACTONE 25 MG: 25 TABLET ORAL at 09:29

## 2021-08-09 RX ADMIN — HYDRALAZINE HYDROCHLORIDE 50 MG: 25 TABLET, FILM COATED ORAL at 09:28

## 2021-08-09 RX ADMIN — INSULIN HUMAN 30 UNITS: 100 INJECTION, SUSPENSION SUBCUTANEOUS at 09:29

## 2021-08-09 RX ADMIN — FUROSEMIDE 80 MG: 40 TABLET ORAL at 09:28

## 2021-08-09 RX ADMIN — DULOXETINE 60 MG: 30 CAPSULE, DELAYED RELEASE ORAL at 09:28

## 2021-08-09 RX ADMIN — DONEPEZIL HYDROCHLORIDE 10 MG: 5 TABLET, FILM COATED ORAL at 21:11

## 2021-08-09 RX ADMIN — ASPIRIN 81 MG: 81 TABLET, CHEWABLE ORAL at 09:27

## 2021-08-09 RX ADMIN — IPRATROPIUM BROMIDE AND ALBUTEROL SULFATE 3 ML: 2.5; .5 SOLUTION RESPIRATORY (INHALATION) at 14:52

## 2021-08-09 NOTE — PLAN OF CARE
Goal Outcome Evaluation:  Plan of Care Reviewed With: patient           Outcome Summary: Pt is a 70 year old man who was admitted from [facility] secondary to palpitations and tachycardia.  Pt was d/c from [facility] on 8/7 after a 12 day stay.  Pt sates he was at [facility] to get stonger and goal is eventually to return home with wife.  Pt required min/mod A with bed mobility, mod A with transfers and ambulated 25' with RW with cga/min A.  Recommend inpt rehab

## 2021-08-09 NOTE — THERAPY EVALUATION
Patient Name: Benson Mclean  : 1950    MRN: [id number]                              Today's Date: 2021       Admit Date: 8/3/2021    Visit Dx:     ICD-10-CM ICD-9-CM   1. Tachycardia  R00.0 785.0   2. Dyspnea, unspecified type  R06.00 786.09     Patient Active Problem List   Diagnosis   • Diabetes mellitus due to underlying condition without complication, without long-term current use of insulin (CMS/[facility])   • S/P CABG (coronary artery bypass graft)   • Essential hypertension   • Dyslipidemia   • Fall   • Elevated troponin   • Closed fracture of seventh thoracic vertebra (CMS/[facility])   • Cerebrovascular accident (CVA) (CMS/[facility])   • Other headache syndrome   • Right hand weakness   • Neurologic gait dysfunction   • Tobacco user   • Status post coronary artery stent placement   • ANNETTE treated with BiPAP   • Right hemiplegia (CMS/[facility])   • Major depressive disorder, recurrent, mild (CMS/[facility])   • Immobility syndrome   • Lymphadenopathy, mediastinal   • Mixed hyperlipidemia   • History of cerebrovascular accident   • History of amputation of lesser toe (CMS/[facility])   • Flaccid hemiplegia of right dominant side as late effect of cerebral infarction (CMS/[facility])   • Diabetic neuropathy (CMS/[facility])   • Depressive disorder   • Unspecified dementia with behavioral disturbance (CMS/[facility])   • COVID-19   • Coronary artery disease   • Constipation   • Obesity   • Cognitive communication deficit   • Vitamin D deficiency   • Chronic venous hypertension (idiopathic) with ulcer and inflammation of bilateral lower extremity (CMS/[facility])   • Chronic obstructive pulmonary disease, unspecified (CMS/[facility])   • Chronic foot ulcer (CMS/[facility])   • Chronic left-sided low back pain without sciatica   • Acute congestive heart failure (CMS/[facility])   • Paroxysmal atrial fibrillation (CMS/[facility])   • Arthritis   • Acute renal insufficiency   • Atrial fibrillation with rapid ventricular response (CMS/[facility])   • Type 2 diabetes mellitus with hyperglycemia  (CMS/HCC)   • Abnormal nuclear stress test   • Cytokine release syndrome, grade 1   • Unstable angina (CMS/HCC)   • Acute respiratory distress   • CKD (chronic kidney disease) stage 3, GFR 30-59 ml/min (CMS/HCC)   • Tobacco use disorder   • Acute on chronic systolic heart failure (CMS/HCC)   • Nonsustained ventricular tachycardia (CMS/HCC)   • Atrial fibrillation with RVR (CMS/HCC)   • Tachycardia   • A-fib (CMS/[facility])   • Anemia of renal disease     Past Medical History:   Diagnosis Date   • Appetite loss    • Brain bleed (CMS/HCC)    • Carpal tunnel syndrome on left     carpal tunnel on left hand   • CHF (congestive heart failure) (CMS/HCC)    • Diabetic neuropathy (CMS/[facility])    • DM2 (diabetes mellitus, type 2) (CMS/[facility])    • History of angina    • Hyperlipidemia    • Hypogonadism in male    • Obesity    • Sleep apnea    • Stroke (CMS/[facility])    • Unsteady gait      Past Surgical History:   Procedure Laterality Date   • ANGIOPLASTY      X2   • APPENDECTOMY     • CARDIAC CATHETERIZATION  11/13/2015   • CARDIAC CATHETERIZATION N/A 5/24/2021    Procedure: Left Heart Cath and coronary angiogram;  Surgeon: Jose G Rm MD;  Location: [facility] CATH INVASIVE LOCATION;  Service: Cardiovascular;  Laterality: N/A;   • CARDIAC CATHETERIZATION  5/24/2021    Procedure: Saphenous Vein Graft;  Surgeon: Jose G Rm MD;  Location: [facility] CATH INVASIVE LOCATION;  Service: Cardiovascular;;   • CARDIAC CATHETERIZATION N/A 5/24/2021    Procedure: Percutaneous Coronary Intervention;  Surgeon: Bernabe Zambrano MD;  Location: [facility] CATH INVASIVE LOCATION;  Service: Cardiology;  Laterality: N/A;   • CARDIAC CATHETERIZATION N/A 5/24/2021    Procedure: Stent NIELS coronary;  Surgeon: Bernabe Zambrano MD;  Location:  [facility] CATH INVASIVE LOCATION;  Service: Cardiology;  Laterality: N/A;   • CARDIAC CATHETERIZATION N/A 5/26/2021    Procedure: Percutaneous Coronary Intervention;  Surgeon: Bernabe Zambrano MD;  Location: [facility] CATH INVASIVE  LOCATION;  Service: Cardiovascular;  Laterality: N/A;   • CARDIAC CATHETERIZATION N/A 5/26/2021    Procedure: Stent NIELS bypass graft;  Surgeon: Bernabe Zambrano MD;  Location: [facility] CATH INVASIVE LOCATION;  Service: Cardiovascular;  Laterality: N/A;   • CARDIAC ELECTROPHYSIOLOGY PROCEDURE N/A 5/24/2021    Procedure: Temporary Pacemaker;  Surgeon: Bernabe Zambrano MD;  Location: [facility] CATH INVASIVE LOCATION;  Service: Cardiology;  Laterality: N/A;   • CARDIAC ELECTROPHYSIOLOGY PROCEDURE N/A 5/26/2021    Procedure: Temporary Pacemaker;  Surgeon: Bernabe Zambrano MD;  Location: [facility] CATH INVASIVE LOCATION;  Service: Cardiovascular;  Laterality: N/A;   • CARPAL TUNNEL RELEASE Left 04/29/2018    carpal tunnel- lt hand// other hand surgeries    • CATARACT EXTRACTION, BILATERAL  2002    Dr. Lux Acosta   • CHOLECYSTECTOMY     • COLON RESECTION  2005   • CORONARY ANGIOPLASTY     • CORONARY ANGIOPLASTY WITH STENT PLACEMENT  11/13/2015    PTCA stent to proximal in stent and mid to distal lad   • CORONARY ANGIOPLASTY WITH STENT PLACEMENT  09/16/2016    PTCA stent to mid lad and stent to vein graft to marginal   • CORONARY ARTERY BYPASS GRAFT  2005    @ [facility]   • CYST REMOVAL      cyst removed from scrotum   • FOOT SURGERY Right 07/17/2018   • FOOT SURGERY Left 02/04/2019   • TOE AMPUTATION Right     right toe removed D/T infected cut that went to the bone     General Information     Row Name 08/09/21 1450          Physical Therapy Time and Intention    Document Type  evaluation  -AM     Row Name 08/09/21 1450          General Information    Patient Profile Reviewed  yes  -AM     Prior Level of Function  independent:;gait;transfer;bed mobility;using stairs ambulates with RW  -AM     Existing Precautions/Restrictions  fall  -AM     Row Name 08/09/21 1450          Living Environment    Lives With  other (see comments) Lives with spouse.  Has been at [place] rehab with goal to return home once stronger  -AM     Row Name 08/09/21 1458           Home Main Entrance    Number of Stairs, Main Entrance  three  -AM     Stair Railings, Main Entrance  railings safe and in good condition  -AM     Row Name 08/09/21 1450          Stairs Within Home, Primary    Number of Stairs, Within Home, Primary  none  -AM     Row Name 08/09/21 1450          Cognition    Orientation Status (Cognition)  oriented x 3  -AM     Row Name 08/09/21 1450          Safety Issues, Functional Mobility    Impairments Affecting Function (Mobility)  endurance/activity tolerance;pain;strength  -AM     Comment, Safety Issues/Impairments (Mobility)  gait belt utilized  -AM       User Key  (r) = Recorded By, (t) = Taken By, (c) = Cosigned By    Initials Name Provider Type    AM Leonard Meng PT Physical Therapist        Mobility     Row Name 08/09/21 1451          Bed Mobility    Bed Mobility  bed mobility (all) activities  -AM     All Activities, Louisa (Bed Mobility)  verbal cues;minimum assist (75% patient effort);moderate assist (50% patient effort)  -AM     Assistive Device (Bed Mobility)  bed rails;head of bed elevated  -AM     Row Name 08/09/21 1451          Sit-Stand Transfer    Sit-Stand Louisa (Transfers)  set up;verbal cues;moderate assist (50% patient effort);1 person assist  -AM     Assistive Device (Sit-Stand Transfers)  walker, front-wheeled  -AM     Row Name 08/09/21 1451          Gait/Stairs (Locomotion)    Louisa Level (Gait)  set up;verbal cues;minimum assist (75% patient effort);1 person assist  -AM     Assistive Device (Gait)  walker, front-wheeled  -AM     Distance in Feet (Gait)  25'  -AM     Deviations/Abnormal Patterns (Gait)  base of support, wide;adolfo decreased;festinating/shuffling BLE ER while ambulating  -AM       User Key  (r) = Recorded By, (t) = Taken By, (c) = Cosigned By    Initials Name Provider Type    AM Leonard Meng PT Physical Therapist        Obj/Interventions     Row Name 08/09/21 1452          Range of Motion Comprehensive     General Range of Motion  no range of motion deficits identified  -AM     Row Name 08/09/21 1452          Strength Comprehensive (MMT)    Comment, General Manual Muscle Testing (MMT) Assessment  RLE hip 4-/5  knee/ankle 4/5.  LLE 4/5 throughout  -AM     Row Name 08/09/21 1452          Balance    Balance Assessment  sitting static balance;sitting dynamic balance;standing static balance;standing dynamic balance  -AM     Static Sitting Balance  WFL  -AM     Dynamic Sitting Balance  WFL  -AM     Static Standing Balance  WFL  -AM     Dynamic Standing Balance  mild impairment  -AM       User Key  (r) = Recorded By, (t) = Taken By, (c) = Cosigned By    Initials Name Provider Type    AM Leonard Meng, PT Physical Therapist        Goals/Plan     Row Name 08/09/21 1457          Bed Mobility Goal 1 (PT)    Activity/Assistive Device (Bed Mobility Goal 1, PT)  bed mobility activities, all  -AM     [place] Level/Cues Needed (Bed Mobility Goal 1, PT)  modified independence;1 person to manage equipment  -AM     Time Frame (Bed Mobility Goal 1, PT)  long term goal (LTG)  -AM     Row Name 08/09/21 1457          Transfer Goal 1 (PT)    Activity/Assistive Device (Transfer Goal 1, PT)  transfers, all;walker, rolling  -AM     [place] Level/Cues Needed (Transfer Goal 1, PT)  modified independence;1 person to manage equipment  -AM     Time Frame (Transfer Goal 1, PT)  long term goal (LTG)  -AM     [facility] Name 08/09/21 1457          Gait Training Goal 1 (PT)    Activity/Assistive Device (Gait Training Goal 1, PT)  gait (walking locomotion);walker, rolling  -AM     [place] Level (Gait Training Goal 1, PT)  modified independence  -AM     Distance (Gait Training Goal 1, PT)  100'  -AM     Time Frame (Gait Training Goal 1, PT)  long term goal (LTG)  -AM     Row Name 08/09/21 1457          Stairs Goal 1 (PT)    Activity/Assistive Device (Stairs Goal 1, PT)  stairs, all skills  -AM     [place] Level/Cues Needed (Stairs Goal 1,  PT)  supervision required  -AM     Number of Stairs (Stairs Goal 1, PT)  3  -AM     Time Frame (Stairs Goal 1, PT)  long term goal (LTG)  -AM       User Key  (r) = Recorded By, (t) = Taken By, (c) = Cosigned By    Initials Name Provider Type    AM Leonard Meng, PT Physical Therapist        Clinical Impression     Row Name 08/09/21 1455          Pain    Additional Documentation  Pain Scale: Numbers Pre/Post-Treatment (Group)  -AM     Row Name 08/09/21 1458          Pain Scale: Numbers Pre/Post-Treatment    Pretreatment Pain Rating  6/10  -AM     Posttreatment Pain Rating  6/10  -AM     Pain Location - Orientation  lower  -AM     Pain Location  back  -AM     Pain Intervention(s)  Emotional support  -AM     Row Name 08/09/21 145          Plan of Care Review    Plan of Care Reviewed With  patient  -AM     Outcome Summary  Pt is a 70 year old man who [facility]pitations and tachycardia.  Pt was d/c from [facility] on 8/7 after a 12 day stay.  Pt sates [facility] and goal is eventually to return home with wife.  Pt required min/mod A with bed mobility, mod A with transfers and ambulated 25' with RW with cga/min A.  Recommend inpt rehab  -AM     Row Name 08/09/21 1458          Therapy Assessment/Plan (PT)    Patient/Family Therapy Goals Statement (PT)  To go back to [place] for rehab  -AM     Rehab Potential (PT)  good, to achieve stated therapy goals  -AM     Criteria for Skilled Interventions Met (PT)  yes  -AM     Predicted Duration of Therapy Intervention (PT)  until d/c  -AM     Row Name 08/09/21 1453          Vital Signs    Pre Systolic BP Rehab  111  -AM     Pre Treatment Diastolic BP  57  -AM     O2 Delivery Pre Treatment  supplemental O2 2L  -AM     Intra SpO2 (%)  93  -AM     O2 Delivery Intra Treatment  supplemental O2 2L  -AM     Post SpO2 (%)  92  -AM     O2 Delivery Post Treatment  supplemental O2 2L  -AM     Row Name 08/09/21  1453          Positioning and Restraints    Pre-Treatment Position  sitting in chair/recliner  -AM     Post Treatment Position  chair  -AM     In Chair  sitting;call light within reach;encouraged to call for assist;exit alarm on  -AM       User Key  (r) = Recorded By, (t) = Taken By, (c) = Cosigned By    Initials Name Provider Type    AM Leonard Meng, PT Physical Therapist        Outcome Measures     Row Name 08/09/21 1457          How much help from another person do you currently need...    Turning from your back to your side while in flat bed without using bedrails?  3  -AM     Moving from lying on back to sitting on the side of a flat bed without bedrails?  2  -AM     Moving to and from a bed to a chair (including a wheelchair)?  3  -AM     Standing up from a chair using your arms (e.g., wheelchair, bedside chair)?  2  -AM     Climbing 3-5 steps with a railing?  1  -AM     To walk in hospital room?  3  -AM     AM-PAC 6 Clicks Score (PT)  14  -AM     Row Name 08/09/21 1457          Functional Assessment    Outcome Measure Options  AM-PAC 6 Clicks Basic Mobility (PT)  -AM       User Key  (r) = Recorded By, (t) = Taken By, (c) = Cosigned By    Initials Name Provider Type    AM Leonard Meng, PT Physical Therapist                       Physical Therapy Education                 Title: PT OT SLP Therapies (In Progress)     Topic: Physical Therapy (In Progress)     Point: Mobility training (Done)     Learning Progress Summary           Patient Acceptance, E,TB, VU by AM at 8/9/2021 1458                   Point: Home exercise program (Not Started)     Learner Progress:  Not documented in this visit.          Point: Body mechanics (Not Started)     Learner Progress:  Not documented in this visit.          Point: Precautions (Not Started)     Learner Progress:  Not documented in this visit.                      User Key     Initials Effective Dates Name Provider Type Discipline    AM 05/10/21 -  Leonard Meng, PT  Physical Therapist PT              PT Recommendation and Plan     Plan of Care Reviewed With: patient  Outcome Summary: Pt is a 70 year old man who [facility] and tachycardia.  Pt was d/c from [facility] on 8/7 after a 12 day stay.  Pt sates he was at [facility] to get stonger and goal is eventually to return home with wife.  Pt required min/mod A with bed mobility, mod A with transfers and ambulated 25' with RW with cga/min A.  Recommend in rehab     Time Calculation:   PT Charges     Row Name 08/09/21 1458             Time Calculation    Start Time  1427  -AM      Stop Time  1451  -AM      Time Calculation (min)  24 min  -AM      PT Received On  08/09/21  -AM      PT - Next Appointment  08/10/21  -AM      PT Goal Re-Cert Due Date  08/23/21  -AM         Time Calculation- PT    Total Timed Code Minutes- PT  14 minute(s)  -AM        User Key  (r) = Recorded By, (t) = Taken By, (c) = Cosigned By    Initials Name Provider Type    AM Leonard Meng, PT Physical Therapist        Therapy Charges for Today     Code Description Service Date Service Provider Modifiers Qty    28265454079 HC PT EVAL MOD COMPLEXITY 2 8/9/2021 Leonard Meng, PT GP 1    29655912941 HC GAIT TRAINING EA 15 MIN 8/9/2021 Leonard Meng, PT GP 1          PT G-Codes  Outcome Measure Options: AM-PAC 6 Clicks Basic Mobility (PT)  AM-PAC 6 Clicks Score (PT): 14    Leonard Meng PT  8/9/2021

## 2021-08-09 NOTE — PROGRESS NOTES
Referring Provider: Stanton Junior MD    Reason for follow-up:  Atrial fibrillation  Cardiomyopathy  Shortness of breath     Patient Care Team:  Samantha Zabala APRN as PCP - General  Samantha Zabala APRN as PCP - Family Medicine  Jose G Rm MD as Consulting Physician (Cardiology)    Subjective .  Feeling better     ROS    Since I have last seen him, the patient has been without any chest discomfort ,shortness of breath, palpitations, dizziness or syncope.  Denies having any headache ,abdominal pain ,nausea, vomiting , diarrhea constipation, loss of weight or loss of appetite.  Denies having any excessive bruising ,hematuria or blood in the stool.    Review of all systems negative except as indicated    History  Past Medical History:   Diagnosis Date   • Appetite loss    • Brain bleed (CMS/[facility])    • Carpal tunnel syndrome on left     carpal tunnel on left hand   • CHF (congestive heart failure) (CMS/HCC)    • Diabetic neuropathy (CMS/HCC)    • DM2 (diabetes mellitus, type 2) (CMS/[facility])    • History of angina    • Hyperlipidemia    • Hypogonadism in male    • Obesity    • Sleep apnea    • Stroke (CMS/[facility])    • Unsteady gait        Past Surgical History:   Procedure Laterality Date   • ANGIOPLASTY      X2   • APPENDECTOMY     • CARDIAC CATHETERIZATION  11/13/2015   • CARDIAC CATHETERIZATION N/A 5/24/2021    Procedure: Left Heart Cath and coronary angiogram;  Surgeon: Jose G Rm MD;  Location: [facility] CATH INVASIVE LOCATION;  Service: Cardiovascular;  Laterality: N/A;   • CARDIAC CATHETERIZATION  5/24/2021    Procedure: Saphenous Vein Graft;  Surgeon: Jose G Rm MD;  Location: [facility] CATH INVASIVE LOCATION;  Service: Cardiovascular;;   • CARDIAC CATHETERIZATION N/A 5/24/2021    Procedure: Percutaneous Coronary Intervention;  Surgeon: Bernabe Zambrano MD;  Location: [facility] CATH INVASIVE LOCATION;  Service: Cardiology;  Laterality: N/A;   • CARDIAC CATHETERIZATION N/A 5/24/2021    Procedure: Stent NIELS  coronary;  Surgeon: Bernabe Zambrano MD;  Location:  [facility] CATH INVASIVE LOCATION;  Service: Cardiology;  Laterality: N/A;   • CARDIAC CATHETERIZATION N/A 5/26/2021    Procedure: Percutaneous Coronary Intervention;  Surgeon: Bernabe Zambrano MD;  Location:  [facility] CATH INVASIVE LOCATION;  Service: Cardiovascular;  Laterality: N/A;   • CARDIAC CATHETERIZATION N/A 5/26/2021    Procedure: Stent NIELS bypass graft;  Surgeon: Bernabe Zambrano MD;  Location: [facility] CATH INVASIVE LOCATION;  Service: Cardiovascular;  Laterality: N/A;   • CARDIAC ELECTROPHYSIOLOGY PROCEDURE N/A 5/24/2021    Procedure: Temporary Pacemaker;  Surgeon: Bernabe Zambrano MD;  Location:  [facility] CATH INVASIVE LOCATION;  Service: Cardiology;  Laterality: N/A;   • CARDIAC ELECTROPHYSIOLOGY PROCEDURE N/A 5/26/2021    Procedure: Temporary Pacemaker;  Surgeon: Bernabe Zambrano MD;  Location: [facility] CATH INVASIVE LOCATION;  Service: Cardiovascular;  Laterality: N/A;   • CARPAL TUNNEL RELEASE Left 04/29/2018    carpal tunnel- lt hand// other hand surgeries    • CATARACT EXTRACTION, BILATERAL  2002    Dr. Lux cAosta   • CHOLECYSTECTOMY     • COLON RESECTION  2005   • CORONARY ANGIOPLASTY     • CORONARY ANGIOPLASTY WITH STENT PLACEMENT  11/13/2015    PTCA stent to proximal in stent and mid to distal lad   • CORONARY ANGIOPLASTY WITH STENT PLACEMENT  09/16/2016    PTCA stent to mid lad and stent to vein graft to marginal   • CORONARY ARTERY BYPASS GRAFT  2005    @ [facility]   • CYST REMOVAL      cyst removed from scrotum   • FOOT SURGERY Right 07/17/2018   • FOOT SURGERY Left 02/04/2019   • TOE AMPUTATION Right     right toe removed D/T infected cut that went to the bone       Family History   Problem Relation Age of Onset   • Heart disease Mother    • Heart disease Father    • Diabetes Sister    • Heart disease Sister    • Diabetes Brother    • Mental illness Brother        Social History     Tobacco Use   • Smoking status: Former Smoker     Packs/day: 1.00     Years: 60.00      Pack years: 60.00     Types: Cigarettes   • Smokeless tobacco: Never Used   • Tobacco comment: Patient quit smoking 11/2020   Vaping Use   • Vaping Use: Never used   Substance Use Topics   • Alcohol use: No   • Drug use: Yes     Frequency: 1.0 times per week     Types: Marijuana     Comment: socially        Medications Prior to Admission   Medication Sig Dispense Refill Last Dose   • albuterol sulfate  (90 Base) MCG/ACT inhaler Inhale 2 puffs Every 4 (Four) Hours As Needed.      • apixaban (ELIQUIS) 5 MG tablet tablet Take 5 mg by mouth Daily.      • aspirin 81 MG chewable tablet Chew 81 mg Daily.      • atorvastatin (LIPITOR) 40 MG tablet Take 40 mg by mouth Every Night.      • cholecalciferol (VITAMIN D3) 25 MCG (1000 UT) tablet Take 1,000 Units by mouth Daily.      • dextrose (GLUTOSE) 40 % gel Take  by mouth Every 1 (One) Hour As Needed for Low Blood Sugar.      • donepezil (ARICEPT) 10 MG tablet Take 10 mg by mouth Every Night.      • DULoxetine HCl (DRIZALMA) 60 MG capsule Take 60 mg by mouth Daily.      • gabapentin (NEURONTIN) 100 MG capsule Take 2 capsules by mouth 2 (two) times a day. 6 capsule 0    • hydrALAZINE (APRESOLINE) 50 MG tablet Take 50 mg by mouth 2 (two) times a day. Hold for SBP <110      • isosorbide mononitrate (IMDUR) 60 MG 24 hr tablet Take 60 mg by mouth 2 (Two) Times a Day.      • metoprolol succinate XL (TOPROL-XL) 100 MG 24 hr tablet Take 1 tablet by mouth Daily for 30 days. 30 tablet 0    • nitroglycerin (NITROSTAT) 0.4 MG SL tablet Place 1 tablet under the tongue Every 5 (Five) Minutes As Needed for Chest Pain (Only if SBP Greater Than 100). Take no more than 3 doses in 15 minutes. 30 tablet 12    • spironolactone (ALDACTONE) 25 MG tablet Take 1 tablet by mouth Daily for 30 days. 30 tablet 0    • tiotropium (Spiriva HandiHaler) 18 MCG per inhalation capsule Place 1 capsule into inhaler and inhale Daily. 30 capsule 1    • vitamin B-12 (CYANOCOBALAMIN) 1000 MCG tablet Take 1,000  "mcg by mouth Daily.      • [DISCONTINUED] furosemide (LASIX) 40 MG tablet Take 1 tablet by mouth 2 (Two) Times a Day for 30 days. 60 tablet 0        Allergies  Codeine    Scheduled Meds:amiodarone, 200 mg, Oral, Q12H  apixaban, 5 mg, Oral, Q12H  aspirin, 81 mg, Oral, Daily  atorvastatin, 40 mg, Oral, Nightly  carvedilol, 6.25 mg, Oral, BID With Meals  cholecalciferol, 1,000 Units, Oral, Daily  dilTIAZem, 10 mg, Intravenous, Once  dilTIAZem CD, 240 mg, Oral, Q24H  donepezil, 10 mg, Oral, Nightly  DULoxetine, 60 mg, Oral, Daily  furosemide, 80 mg, Oral, BID  gabapentin, 200 mg, Oral, Q12H  hydrALAZINE, 50 mg, Oral, Q12H  insulin lispro, 0-9 Units, Subcutaneous, TID AC  insulin NPH-insulin regular, 30 Units, Subcutaneous, BID With Meals  ipratropium-albuterol, 3 mL, Nebulization, 4x Daily - RT  isosorbide mononitrate, 60 mg, Oral, BID - Nitrates  sodium chloride, 10 mL, Intravenous, Q12H  spironolactone, 25 mg, Oral, Daily  vitamin B-12, 1,000 mcg, Oral, Daily      Continuous Infusions:   PRN Meds:.•  acetaminophen **OR** acetaminophen **OR** acetaminophen  •  aluminum-magnesium hydroxide-simethicone  •  dextrose  •  dextrose  •  glucagon (human recombinant)  •  insulin lispro **AND** insulin lispro  •  melatonin  •  nitroglycerin  •  ondansetron **OR** ondansetron  •  [COMPLETED] Insert peripheral IV **AND** sodium chloride  •  sodium chloride  •  traMADol    Objective     VITAL SIGNS  Vitals:    08/08/21 1954 08/08/21 2241 08/09/21 0241 08/09/21 0609   BP:  115/52 122/54 132/58   BP Location:  Left arm Left arm Left arm   Patient Position:  Lying Lying Lying   Pulse: 55 56 58 61   Resp:  10 14 18   Temp:  98.3 °F (36.8 °C) 98.2 °F (36.8 °C) 98.3 °F (36.8 °C)   TempSrc:  Oral Oral Oral   SpO2: 100% 97% 97% (!) 87%   Weight:    113 kg (249 lb 1.9 oz)   Height:           Flowsheet Rows      First Filed Value   Admission Height  180.3 cm (71\") Documented at 08/03/2021 1733   Admission Weight  113 kg (250 lb) Documented " at 08/03/2021 1733            Intake/Output Summary (Last 24 hours) at 8/9/2021 0628  Last data filed at 8/9/2021 0609  Gross per 24 hour   Intake 592 ml   Output 850 ml   Net -258 ml        TELEMETRY: Atrial fibrillation with controlled ventricular response.    Physical Exam:  The patient is alert, oriented and in no distress.  Vital signs as noted above.  Exogenous obesity.  Head and neck revealed no carotid bruits or jugular venous distention.  No thyromegaly or lymphadenopathy is present  Lungs clear.  No wheezing.  Breath sounds are normal bilaterally.  Heart normal first and second heart sounds.  No murmur. No precordial rub is present.  No gallop is present.  Abdomen soft and nontender.  No organomegaly is present.  Extremities with good peripheral pulses without any pedal edema.  Skin warm and dry.  Musculoskeletal system is grossly normal  CNS grossly normal      Results Review:   I reviewed the patient's new clinical results.  Lab Results (last 24 hours)     Procedure Component Value Units Date/Time    POC Glucose Once [[id number]]  (Abnormal) Collected: 08/08/21 2115    Specimen: Blood Updated: 08/08/21 2116     Glucose 137 mg/dL      Comment: Serial Number: [id number]:  [id number]       POC Glucose Once [[id number]]  (Abnormal) Collected: 08/08/21 1608    Specimen: Blood Updated: 08/08/21 1611     Glucose 126 mg/dL      Comment: Serial Number: [id number]:  [id number]       POC Glucose Once [[id number]]  (Abnormal) Collected: 08/08/21 1111    Specimen: Blood Updated: 08/08/21 1113     Glucose 190 mg/dL      Comment: Serial Number: [id number]:  [id number]             Imaging Results (Last 24 Hours)     ** No results found for the last 24 hours. **      LAB RESULTS (LAST 7 DAYS)    CBC  Results from last 7 days   Lab Units 08/06/21  0625 08/05/21  0300 08/04/21  0519 08/03/21  1837   WBC 10*3/mm3 5.70 6.40 7.40 7.40   RBC 10*6/mm3 2.74* 2.45* 2.48* 2.82*   HEMOGLOBIN g/dL 8.1* 7.0* 7.1* 8.0*    HEMATOCRIT % 24.0* 21.0* 21.4* 24.5*   MCV fL 87.4 86.0 86.2 86.6   PLATELETS 10*3/mm3 307 310 312 334       BMP  Results from last 7 days   Lab Units 08/07/21 0314 08/06/21 0625 08/05/21 0300 08/04/21 0519 08/03/21  1837   SODIUM mmol/L 138 139 141 140 139   POTASSIUM mmol/L 4.6 4.4 4.6 4.6 4.5   CHLORIDE mmol/L 95* 96* 98 97* 95*   CO2 mmol/L 33.0* 34.0* 31.0* 33.0* 32.0*   BUN mg/dL 67* 66* 68* 65* 68*   CREATININE mg/dL 2.35* 2.19* 2.04* 1.95* 1.91*   GLUCOSE mg/dL 139* 66 71 171* 198*   MAGNESIUM mg/dL  --   --  2.1  --   --    PHOSPHORUS mg/dL  --   --  5.1*  --   --        CMP   Results from last 7 days   Lab Units 08/07/21 0314 08/06/21 0625 08/05/21 0300 08/04/21 0519 08/03/21  1837   SODIUM mmol/L 138 139 141 140 139   POTASSIUM mmol/L 4.6 4.4 4.6 4.6 4.5   CHLORIDE mmol/L 95* 96* 98 97* 95*   CO2 mmol/L 33.0* 34.0* 31.0* 33.0* 32.0*   BUN mg/dL 67* 66* 68* 65* 68*   CREATININE mg/dL 2.35* 2.19* 2.04* 1.95* 1.91*   GLUCOSE mg/dL 139* 66 71 171* 198*         BNP        TROPONIN  Results from last 7 days   Lab Units 08/04/21 0519   TROPONIN T ng/mL 0.792*       CoAg        Creatinine Clearance  Estimated Creatinine Clearance: 35.1 mL/min (A) (by C-G formula based on SCr of 2.35 mg/dL (H)).    ABG        Radiology  No radiology results for the last day            EKG              I personally viewed and interpreted the patient's EKG/Telemetry data:    ECHOCARDIOGRAM:    Results for orders placed during the hospital encounter of 07/20/21    Adult Transthoracic Echo Complete With Contrast if Necessary Per Protocol    Interpretation Summary  · Estimated left ventricular EF = 30% Left ventricular systolic function is moderately decreased.    Occasions  Shortness of breath.    Technically satisfactory study.  Mitral valve is structurally normal.  Mild mitral regurgitation.  Tricuspid valve is structurally normal.  Aortic valve is structurally normal.  Pulmonic valve could not be well visualized.  No  evidence for tricuspid or aortic regurgitation is seen by Doppler study.  Biatrial and biventricular enlargement is present.  Diffuse left ventricular hypocontractility with ejection fraction of 30%.  Atrial septum is intact.  Aorta is normal.  No pericardial effusion or intracardiac thrombus is seen.    Impression  Mild mitral regurgitation.  Significant biatrial and biventricular enlargement.  Diffuse left ventricular hypocontractility with ejection fraction of 30%.  No pericardial effusion or intracardiac thrombus is seen.          STRESS MYOVIEW:    Cardiolite (Tc-99m Sestamibi) stress test    CARDIAC CATHETERIZATION:            OTHER:         Assessment/Plan     Principal Problem:    Atrial fibrillation with RVR (CMS/HCC)  Active Problems:    S/P CABG (coronary artery bypass graft)    Essential hypertension    Elevated troponin    ANNETTE treated with BiPAP    Mixed hyperlipidemia    Flaccid hemiplegia of right dominant side as late effect of cerebral infarction (CMS/HCC)    Diabetic neuropathy (CMS/HCC)    Unspecified dementia with behavioral disturbance (CMS/HCC)    Coronary artery disease    Chronic venous hypertension (idiopathic) with ulcer and inflammation of bilateral lower extremity (CMS/HCC)    Chronic obstructive pulmonary disease, unspecified (CMS/HCC)    Chronic foot ulcer (CMS/HCC)    CKD (chronic kidney disease) stage 3, GFR 30-59 ml/min (CMS/HCC)    Tobacco use disorder    Acute on chronic systolic heart failure (CMS/HCC)    Anemia of renal disease      [[[[[[[[[[[[[[[[[[[[[[[[[    Impression  ==============  -Increased shortness of breath and palpitations     -Recent acute on chronic congestive heart failure.  Compensated at this time  Recent echocardiogram showed left ventricle dysfunction with ejection fraction of 35%.     -History of right-sided weakness with left MCA territory stroke.-Improved      -status post CABG 2005. status post stent to LAD 2009    Status post stent to LAD  11/13/2015  Status post stent to mid LAD and SVG to marginal branch 09/16/2016.  Status post stent to mid LAD 5/24/2021  Status post stent to SVG to RCA 5/26/2021     Cardiac catheterization 5/24/2021 revealed  Left ventricle angiogram was not performed due to pre-existing renal dysfunction.  Left main coronary artery normal.  Left anterior descending artery has mid segment lengthy 90% disease within the previously placed stent.  Distal left into descending artery has diffuse disease.  Circumflex coronary artery is totally occluded.  (Chronic)  Right coronary artery is chronically occluded.  SVG to marginal branch (jump graft to OM1 and OM 2) is totally occluded (new finding compared to 2015.  SVG to PDA has distal radiolucency.  However no definite obstructive disease is present.       -status post myocardial infarction 2000 and 2002 .      -history of intermittent atrial fibrillation-maintaining sinus rhythm     Transesophageal echocardiogram 11/30/2020.  Biatrial enlargement.  Smoking effect in the left atrium and left ventricle and left atrial appendage.  Mild mitral and aortic regurgitation.  Left ventricle enlargement with diffuse hypocontractility with ejection fraction of 35 to 40%.     Echocardiogram 11/27/2020 revealed left atrial enlargement left ventricle dysfunction with ejection fraction of 40%.  Negative bubble study.      -diabetes hypertension and sleep apnea in history of renal dysfunction .  Recent BUN/creatinine 38/1.36     -status post colon surgery (partial colectomy) appendectomy cholecystectomy right 4th toe removal and carpal tunnel surgery      -continued smoking 1 pack per day -abstinence from smoking      -allergy to codeine.  ============   Plan  ================  Atrial fibrillation-rate is better controlled.  Patient is off intravenous Cardizem  Continue Coreg and p.o. Cardizem and amiodarone 200 mg once a day    Troponin level is slightly elevated  0.11  0.41  No bump up in  troponin.    Significant renal dysfunction-chronic  BUN/68/1.91-8/4/2021.  BUN 64 creatinine 1.95-8/2/2021  68/2.04-8/5/2021  67/2.35-8/9/2021    Anemia  hgb 7.1  7-8/5/2021  7 PRBC  Receiving iron transfusion.  8.1-8/9/2021     Status post CABG  Patient is not having any angina pectoris  Status post stent to LAD 5/24/2021.  Status post stent to SVG to RCA 5/26/2021.       Anticoagulation  Patient is on Eliquis.    Medications were reviewed and updated.  Current medications include amiodarone 200 mg a day Eliquis aspirin atorvastatin Coreg Cardizem CD Lasix gabapentin hydralazine Imdur spironolactone.    Okay with transfer plans to rehab from cardiac standpoint.    Further plan will depend on patient's progress.   ]]]]]]]]]]]]]]]]]]]]]]           Jose G Rm MD  08/09/21  06:28 EDT

## 2021-08-09 NOTE — CASE MANAGEMENT/SOCIAL WORK
Continued Stay Note  DOREEN Amin     Patient Name: Benson Mclean  MRN: [id number]  Today's Date: 8/9/2021    Admit Date: 8/3/2021    Discharge Plan     Row Name 08/09/21 1407       Plan    Plan  DC Plan: Return to [facility], requires precert, no PASRR required. Pharm update in, Requires PT/OT notes to start precert.      Chart review only.       Expected Discharge Date and Time     Expected Discharge Date Expected Discharge Time    Aug 6, 2021             Luis Daniel Wolff RN

## 2021-08-09 NOTE — PROGRESS NOTES
[facility] Medicine Services Daily Progress Note    Patient Name: Benson Mclean  : 1950  MRN: [id number]  Primary Care Physician:  Samantha Zabala APRN  Date of admission: 8/3/2021      Subjective      Chief Complaint: Palpitations      Patient Reports Benson Mclean is a 70 y.o. male w/PMH of CHF, CKD, DM, HTN, HLD, CAD, COPD, A. fib, CABG, ANNETTE, CVA, neuropathy, obesity, depression, COVID-19 who presents to [facility] ED with palpitations, patient was discharged from this facility yesterday after a 12-day admission.  Patient states tachycardia progressively worsened after discharge from this facility yesterday, no aggravating or alleviating factors noted.  Patient admits to chest pain, nausea, weakness, palpitations, edema, dyspnea.  Patient denies fever, chills, nausea, vomiting.  As tachycardia did not improve he decided to go to [facility] ED.        Upon arrival to the ED vitals temp 97, HR 93, RR 22, /75, O2 sat 97% on 4 L nasal cannula.  Labs notable for troponin 0.117, proBNP 7648, glucose 198, , K4.5, CO2 32, creatinine 1.91, BUN 68, GFR 35, WBC 7.4, Hgb 8.0, platelets 334, neutrophils 65.9.  EKG shows tachycardia rate 126 repolarization abnormality suggest ischemia diffuse leads.  Chest x-ray shows stable cardiomegaly with pulmonary interstitial edema pattern trace bilateral pleural effusions.  Patient treated in the ED with 10 mg IV Cardizem, IV Cardizem drip initiated.    Readmitted from [facility] after having been discharged 24 hours ago. 21, 3:42 PM EDT     Patient much improved.  Heart rate controlled.  No significant complaints 21, 8:38 PM EDT    After I made the discharge yesterday I was told patient needs a pre-CERT.  Hence discharge canceled 21, 5:06 PM EDT    No new complaints. 21, 2:39 PM EDT    21:HR has been stable and he has no new complaints. No chest pain.     Review of Systems    Cardiovascular: Negative for chest pain and palpitations.            Objective      Vitals:   Temp:  [97.6 °F (36.4 °C)-98.3 °F (36.8 °C)] 97.8 °F (36.6 °C)  Heart Rate:  [44-80] 52  Resp:  [10-18] 18  BP: (105-132)/(44-58) 109/47  Flow (L/min):  [2-3] 2    Physical Exam  Vitals reviewed.   HENT:      Head: Normocephalic.   Eyes:      Pupils: Pupils are equal, round, and reactive to light.   Cardiovascular:      Rate and Rhythm: Normal rate.   Pulmonary:      Effort: No respiratory distress.   Abdominal:      General: There is no distension.      Palpations: Abdomen is soft.   Musculoskeletal:      Right lower leg: Edema present.      Left lower leg: Edema present.   Neurological:      Mental Status: He is alert. Mental status is at baseline.   Psychiatric:         Mood and Affect: Mood normal.        Noted      Result Review    Result Review:  I have personally reviewed the results from the time of this admission to 8/9/2021 16:26 EDT and agree with these findings:  [x]  Laboratory  []  Microbiology  [x]  Radiology  []  EKG/Telemetry   []  Cardiology/Vascular   []  Pathology  [x]  Old records  []  Other:             Assessment/Plan      Brief Patient Summary:  Benson Mclean is a 70 y.o. male who       amiodarone, 200 mg, Oral, Q12H  apixaban, 5 mg, Oral, Q12H  aspirin, 81 mg, Oral, Daily  atorvastatin, 40 mg, Oral, Nightly  carvedilol, 6.25 mg, Oral, BID With Meals  cholecalciferol, 1,000 Units, Oral, Daily  dilTIAZem, 10 mg, Intravenous, Once  dilTIAZem CD, 240 mg, Oral, Q24H  donepezil, 10 mg, Oral, Nightly  DULoxetine, 60 mg, Oral, Daily  furosemide, 80 mg, Oral, BID  gabapentin, 200 mg, Oral, Q12H  hydrALAZINE, 50 mg, Oral, Q12H  insulin lispro, 0-9 Units, Subcutaneous, TID AC  insulin NPH-insulin regular, 30 Units, Subcutaneous, BID With Meals  ipratropium-albuterol, 3 mL, Nebulization, 4x Daily - RT  isosorbide mononitrate, 60 mg, Oral, BID - Nitrates  sodium chloride, 10 mL, Intravenous,  Q12H  spironolactone, 25 mg, Oral, Daily  vitamin B-12, 1,000 mcg, Oral, Daily             Active Hospital Problems:  Active Hospital Problems    Diagnosis    • **Atrial fibrillation with RVR (CMS/[facility])    • Anemia of renal disease    • Acute on chronic systolic heart failure (CMS/[facility])    • Tobacco use disorder    • CKD (chronic kidney disease) stage 3, GFR 30-59 ml/min (CMS/[facility])    • Mixed hyperlipidemia    • Chronic obstructive pulmonary disease, unspecified (CMS/[facility])    • Flaccid hemiplegia of right dominant side as late effect of cerebral infarction (CMS/[facility])    • Unspecified dementia with behavioral disturbance (CMS/[facility])    • Elevated troponin    • Essential hypertension    • S/P CABG (coronary artery bypass graft)    • Chronic venous hypertension (idiopathic) with ulcer and inflammation of bilateral lower extremity (CMS/[facility])    • Chronic foot ulcer (CMS/[facility])    • Coronary artery disease    • ANNETTE treated with BiPAP    • Diabetic neuropathy (CMS/[facility])      Plan:     Atrial fibrillation:   -on cardizem, amiodarone and Eliquis  -cardiology following    CKD IV  -follow BMP  -on lasix 80mg BID    Chronic HFrEF  -compensated at this time  -diuretics as noted above  -cannot use ACE/ARB d/t Cr above    Essential hypertension:  -Continue home medication hydralazine and imdur     Dyslipidemia:  -Continue home medication atorvastatin 40 mg p.o. nightly     Obesity:  -encourage dietary modifications     Coronary artery disease/history of stent/history of CABG  -Continue ASA  And imdur     COPD:  -no exacerbation      ANNETTE with BiPAP:  -O2 as needed to keep sats greater than 90%  -May use home CPAP nightly as needed        History of CVA/right hemiaplasia:  -CVA in November 2020     Diabetic neuropathy:  Depression:   Unspecified dementia:  -chronic conditions continue home meds as indicated     DVT prophylaxis:  Medical DVT prophylaxis orders are present.    CODE STATUS:    Code Status: CPR  Medical Interventions (Level of  Support Prior to Arrest): Full      Disposition:  I expect patient to be discharged on precert is obtained for rehab     This patient has been examined wearing appropriate Personal Protective Hulyuxhro21/09/21      Electronically signed by Jose Guadalupe Corea DO, 08/09/21, 16:26 EDT.  [facility] Hospitalist Team

## 2021-08-09 NOTE — PLAN OF CARE
Problem: Arrhythmia/Dysrhythmia  Goal: Normalized Cardiac Rhythm  Outcome: Ongoing, Progressing     Problem: Fall Injury Risk  Goal: Absence of Fall and Fall-Related Injury  Outcome: Ongoing, Progressing  Intervention: Identify and Manage Contributors to Fall Injury Risk  Recent Flowsheet Documentation  Taken 8/8/2021 2000 by Guy Castellanos, RN  Medication Review/Management: medications reviewed  Intervention: Promote Injury-Free Environment  Recent Flowsheet Documentation  Taken 8/9/2021 0200 by Guy Castellanos, RN  Safety Promotion/Fall Prevention:   safety round/check completed   room organization consistent   nonskid shoes/slippers when out of bed   lighting adjusted   fall prevention program maintained   clutter free environment maintained   assistive device/personal items within reach  Taken 8/9/2021 0000 by Guy Castellanos, RN  Safety Promotion/Fall Prevention:   safety round/check completed   room organization consistent   nonskid shoes/slippers when out of bed   lighting adjusted   fall prevention program maintained   clutter free environment maintained   assistive device/personal items within reach  Taken 8/8/2021 2200 by Guy Castellanos, RN  Safety Promotion/Fall Prevention:   safety round/check completed   room organization consistent   nonskid shoes/slippers when out of bed   lighting adjusted   fall prevention program maintained   clutter free environment maintained   assistive device/personal items within reach  Taken 8/8/2021 2000 by Guy Castellanos, RN  Safety Promotion/Fall Prevention:   safety round/check completed   room organization consistent   nonskid shoes/slippers when out of bed   lighting adjusted   fall prevention program maintained   clutter free environment maintained   assistive device/personal items within reach     Problem: Adult Inpatient Plan of Care  Goal: Plan of Care Review  Outcome: Ongoing, Progressing  Flowsheets (Taken 8/9/2021 0307)  Progress: improving  Plan  of Care Reviewed With: patient  Goal: Patient-Specific Goal (Individualized)  Outcome: Ongoing, Progressing  Goal: Absence of Hospital-Acquired Illness or Injury  Outcome: Ongoing, Progressing  Intervention: Identify and Manage Fall Risk  Recent Flowsheet Documentation  Taken 8/9/2021 0200 by Guy Castellanos RN  Safety Promotion/Fall Prevention:   safety round/check completed   room organization consistent   nonskid shoes/slippers when out of bed   lighting adjusted   fall prevention program maintained   clutter free environment maintained   assistive device/personal items within reach  Taken 8/9/2021 0000 by Guy Castellanos RN  Safety Promotion/Fall Prevention:   safety round/check completed   room organization consistent   nonskid shoes/slippers when out of bed   lighting adjusted   fall prevention program maintained   clutter free environment maintained   assistive device/personal items within reach  Taken 8/8/2021 2200 by Guy Castellanos RN  Safety Promotion/Fall Prevention:   safety round/check completed   room organization consistent   nonskid shoes/slippers when out of bed   lighting adjusted   fall prevention program maintained   clutter free environment maintained   assistive device/personal items within reach  Taken 8/8/2021 2000 by Guy Castellanos RN  Safety Promotion/Fall Prevention:   safety round/check completed   room organization consistent   nonskid shoes/slippers when out of bed   lighting adjusted   fall prevention program maintained   clutter free environment maintained   assistive device/personal items within reach  Intervention: Prevent Skin Injury  Recent Flowsheet Documentation  Taken 8/9/2021 0000 by Guy Castellanos, RN  Skin Protection:   adhesive use limited   incontinence pads utilized  Taken 8/8/2021 2000 by Guy Castellanos RN  Skin Protection:   adhesive use limited   incontinence pads utilized   tubing/devices free from skin contact  Intervention: Prevent  Infection  Recent Flowsheet Documentation  Taken 8/9/2021 0200 by Guy Castellanos RN  Infection Prevention:   personal protective equipment utilized   visitors restricted/screened  Taken 8/9/2021 0000 by Guy Castellanos RN  Infection Prevention:   personal protective equipment utilized   visitors restricted/screened  Taken 8/8/2021 2200 by Guy Castellanos RN  Infection Prevention:   personal protective equipment utilized   visitors restricted/screened  Taken 8/8/2021 2000 by Guy Castellanos RN  Infection Prevention:   personal protective equipment utilized   visitors restricted/screened  Goal: Optimal Comfort and Wellbeing  Outcome: Ongoing, Progressing  Intervention: Provide Person-Centered Care  Recent Flowsheet Documentation  Taken 8/9/2021 0000 by Guy Castellanos RN  Trust Relationship/Rapport:   care explained   choices provided   thoughts/feelings acknowledged  Taken 8/8/2021 2000 by Guy Castellanos RN  Trust Relationship/Rapport: care explained  Goal: Readiness for Transition of Care  Outcome: Ongoing, Progressing     Problem: Skin Injury Risk Increased  Goal: Skin Health and Integrity  Outcome: Ongoing, Progressing  Intervention: Optimize Skin Protection  Recent Flowsheet Documentation  Taken 8/9/2021 0000 by Guy Castellanos RN  Pressure Reduction Techniques: frequent weight shift encouraged  Pressure Reduction Devices: pressure-redistributing mattress utilized  Skin Protection:   adhesive use limited   incontinence pads utilized  Taken 8/8/2021 2000 by Guy Castellanos RN  Pressure Reduction Techniques: frequent weight shift encouraged  Pressure Reduction Devices: pressure-redistributing mattress utilized  Skin Protection:   adhesive use limited   incontinence pads utilized   tubing/devices free from skin contact   Goal Outcome Evaluation:  Plan of Care Reviewed With: patient        Progress: improving

## 2021-08-09 NOTE — PLAN OF CARE
Goal Outcome Evaluation:      Patient demonstrating controlled a-fib, awaiting placement at [place], questions and concerns answered through education, sitting comfortably in chair on 2 liters nasal cannula, will continue to monitor

## 2021-08-09 NOTE — PROGRESS NOTES
"RENAL/KCC:     LOS: 5 days    Patient Care Team:  Samantha Zabala APRN as PCP - General  Samantha Zabala APRN as PCP - Family Medicine  Jose G Rm MD as Consulting Physician (Cardiology)    Chief Complaint:  Palpitations    Subjective     Interval History:   Feels well.  O2 sats mid 90s, off supplemental oxygen HR stable.  Good UOP.  States he feels terrible, when asked to elaborate he stated he was dizzy today.  Blood pressures been low normal, heart rate 50s  Edema improved  Good appetite with no nausea or vomiting      Objective     Vital Sign Min/Max for last 24 hours  Temp  Min: 97.6 °F (36.4 °C)  Max: 98.3 °F (36.8 °C)   BP  Min: 105/44  Max: 132/58   Pulse  Min: 44  Max: 80   Resp  Min: 10  Max: 18   SpO2  Min: 87 %  Max: 100 %   Flow (L/min)  Min: 2  Max: 3   Weight  Min: 113 kg (249 lb 1.9 oz)  Max: 113 kg (249 lb 1.9 oz)     Flowsheet Rows      First Filed Value   Admission Height  180.3 cm (71\") Documented at 08/03/2021 1733   Admission Weight  113 kg (250 lb) Documented at 08/03/2021 1733          I/O this shift:  In: 400 [P.O.:400]  Out: -   I/O last 3 completed shifts:  In: 592 [P.O.:592]  Out: 1150 [Urine:1150]    Physical Exam:  GEN: Awake, NAD  ENT: PERRL, EOMI, MMM  NECK: Supple, no JVD  CHEST: CTAB, no W/R/C  CV: RRR, no M/G/R  ABD: Soft, NT, +BS  SKIN: Warm and Dry  NEURO: CN's intact  Trace lower extremity edema    WBC No results found for: WBC   HGB No results found for: HGB   HCT No results found for: HCT   Platlets No results found for: LABPLAT   MCV No results found for: MCV     Previous labs reviewed    Sodium Sodium   Date Value Ref Range Status   08/07/2021 138 136 - 145 mmol/L Final      Potassium Potassium   Date Value Ref Range Status   08/07/2021 4.6 3.5 - 5.2 mmol/L Final      Chloride Chloride   Date Value Ref Range Status   08/07/2021 95 (L) 98 - 107 mmol/L Final      CO2 CO2   Date Value Ref Range Status   08/07/2021 33.0 (H) 22.0 - 29.0 mmol/L Final      BUN BUN   Date Value " Ref Range Status   08/07/2021 67 (H) 8 - 23 mg/dL Final      Creatinine Creatinine   Date Value Ref Range Status   08/07/2021 2.35 (H) 0.76 - 1.27 mg/dL Final      Calcium Calcium   Date Value Ref Range Status   08/07/2021 8.8 8.6 - 10.5 mg/dL Final      PO4 No results found for: CAPO4   Albumin No results found for: ALBUMIN   Magnesium No results found for: MG   Uric Acid No results found for: URICACID        Results Review:     I reviewed the patient's new clinical results.    amiodarone, 200 mg, Oral, Q12H  apixaban, 5 mg, Oral, Q12H  aspirin, 81 mg, Oral, Daily  atorvastatin, 40 mg, Oral, Nightly  carvedilol, 6.25 mg, Oral, BID With Meals  cholecalciferol, 1,000 Units, Oral, Daily  dilTIAZem, 10 mg, Intravenous, Once  dilTIAZem CD, 240 mg, Oral, Q24H  donepezil, 10 mg, Oral, Nightly  DULoxetine, 60 mg, Oral, Daily  furosemide, 80 mg, Oral, BID  gabapentin, 200 mg, Oral, Q12H  hydrALAZINE, 50 mg, Oral, Q12H  insulin lispro, 0-9 Units, Subcutaneous, TID AC  insulin NPH-insulin regular, 30 Units, Subcutaneous, BID With Meals  ipratropium-albuterol, 3 mL, Nebulization, 4x Daily - RT  isosorbide mononitrate, 60 mg, Oral, BID - Nitrates  sodium chloride, 10 mL, Intravenous, Q12H  spironolactone, 25 mg, Oral, Daily  vitamin B-12, 1,000 mcg, Oral, Daily           Medication Review: Reviewed    Assessment/Plan       Atrial fibrillation with RVR (CMS/[facility])    S/P CABG (coronary artery bypass graft)    Essential hypertension    Elevated troponin    ANNETTE treated with BiPAP    Mixed hyperlipidemia    Flaccid hemiplegia of right dominant side as late effect of cerebral infarction (CMS/[facility])    Diabetic neuropathy (CMS/[facility])    Unspecified dementia with behavioral disturbance (CMS/[facility])    Coronary artery disease    Chronic venous hypertension (idiopathic) with ulcer and inflammation of bilateral lower extremity (CMS/[facility])    Chronic obstructive pulmonary disease, unspecified (CMS/[facility])    Chronic foot ulcer (CMS/[facility])    CKD (chronic  kidney disease) stage 3, GFR 30-59 ml/min (CMS/[facility])    Tobacco use disorder    Acute on chronic systolic heart failure (CMS/[facility])    Anemia of renal disease      1. DILEEP versus progression of CKD, no recent labs  2. CKD IV, may be at new baseline after diuresis  3. Pulmonary edema, O2 sats improved  4. Tachycardia, improved  5. CHF  6. T2DM, on insulin with CKD  7. Hypertension, good control       PLAN:  Renal function has been fairly stable but no recent labs  Recheck chemistries in the morning while awaiting KATIE bed  Continue current oral diuretics: Lasix and Aldactone  Rate control per cardiology  Follow-up a.m. labs, discharge planning           Crow Vences M.D.  [facility]  [phone]

## 2021-08-10 LAB
ANION GAP SERPL CALCULATED.3IONS-SCNC: 10 MMOL/L (ref 5–15)
ARTERIAL PATENCY WRIST A: POSITIVE
ARTERIAL PATENCY WRIST A: POSITIVE
ATMOSPHERIC PRESS: ABNORMAL MM[HG]
ATMOSPHERIC PRESS: ABNORMAL MM[HG]
BASE EXCESS BLDA CALC-SCNC: 8 MMOL/L (ref 0–3)
BASE EXCESS BLDA CALC-SCNC: 8.8 MMOL/L (ref 0–3)
BDY SITE: ABNORMAL
BDY SITE: ABNORMAL
BUN SERPL-MCNC: 71 MG/DL (ref 8–23)
BUN/CREAT SERPL: 26.6 (ref 7–25)
CALCIUM SPEC-SCNC: 8.9 MG/DL (ref 8.6–10.5)
CHLORIDE SERPL-SCNC: 89 MMOL/L (ref 98–107)
CO2 BLDA-SCNC: 37.8 MMOL/L (ref 22–29)
CO2 BLDA-SCNC: 38.3 MMOL/L (ref 22–29)
CO2 SERPL-SCNC: 33 MMOL/L (ref 22–29)
CREAT SERPL-MCNC: 2.67 MG/DL (ref 0.76–1.27)
GFR SERPL CREATININE-BSD FRML MDRD: 24 ML/MIN/1.73
GLUCOSE BLDC GLUCOMTR-MCNC: 113 MG/DL (ref 70–105)
GLUCOSE BLDC GLUCOMTR-MCNC: 117 MG/DL (ref 70–105)
GLUCOSE BLDC GLUCOMTR-MCNC: 126 MG/DL (ref 70–105)
GLUCOSE BLDC GLUCOMTR-MCNC: 223 MG/DL (ref 70–105)
GLUCOSE SERPL-MCNC: 101 MG/DL (ref 65–99)
HCO3 BLDA-SCNC: 35.8 MMOL/L (ref 21–28)
HCO3 BLDA-SCNC: 36 MMOL/L (ref 21–28)
HEMODILUTION: NO
HEMODILUTION: NO
INHALED O2 CONCENTRATION: 70 %
INHALED O2 CONCENTRATION: 70 %
MODALITY: ABNORMAL
MODALITY: ABNORMAL
PCO2 BLDA: 66.1 MM HG (ref 35–48)
PCO2 BLDA: 74.1 MM HG (ref 35–48)
PEEP RESPIRATORY: 10 CM[H2O]
PEEP RESPIRATORY: 6 CM[H2O]
PH BLDA: 7.29 PH UNITS (ref 7.35–7.45)
PH BLDA: 7.34 PH UNITS (ref 7.35–7.45)
PO2 BLDA: 108.5 MM HG (ref 83–108)
PO2 BLDA: 93.3 MM HG (ref 83–108)
POTASSIUM SERPL-SCNC: 5.3 MMOL/L (ref 3.5–5.2)
POTASSIUM SERPL-SCNC: 5.5 MMOL/L (ref 3.5–5.2)
POTASSIUM SERPL-SCNC: 5.6 MMOL/L (ref 3.5–5.2)
POTASSIUM SERPL-SCNC: 5.7 MMOL/L (ref 3.5–5.2)
POTASSIUM SERPL-SCNC: 5.9 MMOL/L (ref 3.5–5.2)
PSV: 6 CMH2O
QT INTERVAL: 500 MS
RESPIRATORY RATE: 18
SAO2 % BLDCOA: 95.8 % (ref 94–98)
SAO2 % BLDCOA: 97.6 % (ref 94–98)
SODIUM SERPL-SCNC: 132 MMOL/L (ref 136–145)
VENTILATOR MODE: ABNORMAL

## 2021-08-10 PROCEDURE — 94799 UNLISTED PULMONARY SVC/PX: CPT

## 2021-08-10 PROCEDURE — 99233 SBSQ HOSP IP/OBS HIGH 50: CPT | Performed by: INTERNAL MEDICINE

## 2021-08-10 PROCEDURE — 82962 GLUCOSE BLOOD TEST: CPT

## 2021-08-10 PROCEDURE — 80048 BASIC METABOLIC PNL TOTAL CA: CPT | Performed by: INTERNAL MEDICINE

## 2021-08-10 PROCEDURE — 82803 BLOOD GASES ANY COMBINATION: CPT

## 2021-08-10 PROCEDURE — 36600 WITHDRAWAL OF ARTERIAL BLOOD: CPT

## 2021-08-10 PROCEDURE — 99232 SBSQ HOSP IP/OBS MODERATE 35: CPT | Performed by: INTERNAL MEDICINE

## 2021-08-10 PROCEDURE — 63710000001 INSULIN LISPRO (HUMAN) PER 5 UNITS: Performed by: INTERNAL MEDICINE

## 2021-08-10 PROCEDURE — 63710000001 INSULIN REGULAR HUMAN PER 5 UNITS: Performed by: INTERNAL MEDICINE

## 2021-08-10 PROCEDURE — 84132 ASSAY OF SERUM POTASSIUM: CPT | Performed by: INTERNAL MEDICINE

## 2021-08-10 PROCEDURE — 63710000001 INSULIN ISOPHANE & REGULAR PER 5 UNITS: Performed by: INTERNAL MEDICINE

## 2021-08-10 PROCEDURE — 97530 THERAPEUTIC ACTIVITIES: CPT

## 2021-08-10 PROCEDURE — 94660 CPAP INITIATION&MGMT: CPT

## 2021-08-10 PROCEDURE — 97166 OT EVAL MOD COMPLEX 45 MIN: CPT

## 2021-08-10 PROCEDURE — 25010000002 CALCIUM GLUCONATE-NACL 1-0.675 GM/50ML-% SOLUTION: Performed by: INTERNAL MEDICINE

## 2021-08-10 RX ORDER — CALCIUM GLUCONATE 20 MG/ML
1 INJECTION, SOLUTION INTRAVENOUS ONCE
Status: COMPLETED | OUTPATIENT
Start: 2021-08-10 | End: 2021-08-10

## 2021-08-10 RX ORDER — FUROSEMIDE 40 MG/1
40 TABLET ORAL
Status: DISCONTINUED | OUTPATIENT
Start: 2021-08-10 | End: 2021-08-10

## 2021-08-10 RX ORDER — FUROSEMIDE 40 MG/1
40 TABLET ORAL
Status: DISCONTINUED | OUTPATIENT
Start: 2021-08-11 | End: 2021-08-11

## 2021-08-10 RX ORDER — SODIUM POLYSTYRENE SULFONATE 15 G/60ML
15 SUSPENSION ORAL; RECTAL ONCE
Status: COMPLETED | OUTPATIENT
Start: 2021-08-10 | End: 2021-08-10

## 2021-08-10 RX ORDER — DEXTROSE MONOHYDRATE 25 G/50ML
50 INJECTION, SOLUTION INTRAVENOUS ONCE
Status: COMPLETED | OUTPATIENT
Start: 2021-08-10 | End: 2021-08-10

## 2021-08-10 RX ORDER — HYDROCODONE BITARTRATE AND ACETAMINOPHEN 7.5; 325 MG/1; MG/1
1 TABLET ORAL EVERY 6 HOURS PRN
Status: DISCONTINUED | OUTPATIENT
Start: 2021-08-10 | End: 2021-08-11

## 2021-08-10 RX ADMIN — TRAMADOL HYDROCHLORIDE 50 MG: 50 TABLET, FILM COATED ORAL at 14:45

## 2021-08-10 RX ADMIN — ASPIRIN 81 MG: 81 TABLET, CHEWABLE ORAL at 09:39

## 2021-08-10 RX ADMIN — AMIODARONE HYDROCHLORIDE 200 MG: 200 TABLET ORAL at 20:28

## 2021-08-10 RX ADMIN — CARVEDILOL 6.25 MG: 6.25 TABLET, FILM COATED ORAL at 09:38

## 2021-08-10 RX ADMIN — Medication 10 ML: at 09:39

## 2021-08-10 RX ADMIN — INSULIN LISPRO 2 UNITS: 100 INJECTION, SOLUTION INTRAVENOUS; SUBCUTANEOUS at 12:17

## 2021-08-10 RX ADMIN — IPRATROPIUM BROMIDE AND ALBUTEROL SULFATE 3 ML: 2.5; .5 SOLUTION RESPIRATORY (INHALATION) at 11:36

## 2021-08-10 RX ADMIN — DILTIAZEM HYDROCHLORIDE 240 MG: 240 CAPSULE, COATED, EXTENDED RELEASE ORAL at 09:38

## 2021-08-10 RX ADMIN — ISOSORBIDE MONONITRATE 60 MG: 60 TABLET, EXTENDED RELEASE ORAL at 16:52

## 2021-08-10 RX ADMIN — Medication 1000 UNITS: at 09:38

## 2021-08-10 RX ADMIN — DULOXETINE 60 MG: 30 CAPSULE, DELAYED RELEASE ORAL at 09:38

## 2021-08-10 RX ADMIN — GABAPENTIN 200 MG: 100 CAPSULE ORAL at 20:28

## 2021-08-10 RX ADMIN — CYANOCOBALAMIN TAB 1000 MCG 1000 MCG: 1000 TAB at 09:38

## 2021-08-10 RX ADMIN — HYDRALAZINE HYDROCHLORIDE 50 MG: 25 TABLET, FILM COATED ORAL at 20:28

## 2021-08-10 RX ADMIN — SODIUM CHLORIDE 500 ML: 0.9 INJECTION, SOLUTION INTRAVENOUS at 10:22

## 2021-08-10 RX ADMIN — AMIODARONE HYDROCHLORIDE 200 MG: 200 TABLET ORAL at 12:17

## 2021-08-10 RX ADMIN — IPRATROPIUM BROMIDE AND ALBUTEROL SULFATE 3 ML: 2.5; .5 SOLUTION RESPIRATORY (INHALATION) at 18:23

## 2021-08-10 RX ADMIN — APIXABAN 5 MG: 5 TABLET, FILM COATED ORAL at 09:38

## 2021-08-10 RX ADMIN — DONEPEZIL HYDROCHLORIDE 10 MG: 5 TABLET, FILM COATED ORAL at 20:28

## 2021-08-10 RX ADMIN — GABAPENTIN 200 MG: 100 CAPSULE ORAL at 09:38

## 2021-08-10 RX ADMIN — CALCIUM GLUCONATE 1 G: 20 INJECTION, SOLUTION INTRAVENOUS at 09:39

## 2021-08-10 RX ADMIN — CARVEDILOL 6.25 MG: 6.25 TABLET, FILM COATED ORAL at 16:52

## 2021-08-10 RX ADMIN — SODIUM POLYSTYRENE SULFONATE 15 G: 15 SUSPENSION ORAL; RECTAL at 09:40

## 2021-08-10 RX ADMIN — ISOSORBIDE MONONITRATE 60 MG: 60 TABLET, EXTENDED RELEASE ORAL at 09:38

## 2021-08-10 RX ADMIN — APIXABAN 5 MG: 5 TABLET, FILM COATED ORAL at 20:28

## 2021-08-10 RX ADMIN — DEXTROSE MONOHYDRATE 50 ML: 25 INJECTION, SOLUTION INTRAVENOUS at 09:39

## 2021-08-10 RX ADMIN — IPRATROPIUM BROMIDE AND ALBUTEROL SULFATE 3 ML: 2.5; .5 SOLUTION RESPIRATORY (INHALATION) at 08:09

## 2021-08-10 RX ADMIN — Medication 10 ML: at 20:28

## 2021-08-10 RX ADMIN — SODIUM BICARBONATE 50 MEQ: 84 INJECTION, SOLUTION INTRAVENOUS at 09:40

## 2021-08-10 RX ADMIN — INSULIN HUMAN 10 UNITS: 100 INJECTION, SOLUTION PARENTERAL at 09:40

## 2021-08-10 RX ADMIN — IPRATROPIUM BROMIDE AND ALBUTEROL SULFATE 3 ML: 2.5; .5 SOLUTION RESPIRATORY (INHALATION) at 16:19

## 2021-08-10 RX ADMIN — HYDROCODONE BITARTRATE AND ACETAMINOPHEN 1 TABLET: 7.5; 325 TABLET ORAL at 16:50

## 2021-08-10 RX ADMIN — ATORVASTATIN CALCIUM 40 MG: 40 TABLET, FILM COATED ORAL at 20:28

## 2021-08-10 RX ADMIN — INSULIN HUMAN 30 UNITS: 100 INJECTION, SUSPENSION SUBCUTANEOUS at 09:39

## 2021-08-10 NOTE — PLAN OF CARE
Problem: Arrhythmia/Dysrhythmia  Goal: Normalized Cardiac Rhythm  Outcome: Ongoing, Progressing     Problem: Fall Injury Risk  Goal: Absence of Fall and Fall-Related Injury  Outcome: Ongoing, Progressing  Intervention: Identify and Manage Contributors to Fall Injury Risk  Recent Flowsheet Documentation  Taken 8/10/2021 1400 by Sahra Carpenter RN  Medication Review/Management: medications reviewed  Taken 8/10/2021 1200 by Sahra Carpenter RN  Medication Review/Management: medications reviewed  Taken 8/10/2021 1000 by Sahra Carpenter RN  Medication Review/Management: medications reviewed  Taken 8/10/2021 0800 by Sahra Carpenter RN  Medication Review/Management: medications reviewed  Intervention: Promote Injury-Free Environment  Recent Flowsheet Documentation  Taken 8/10/2021 1400 by Sahra Carpenter RN  Safety Promotion/Fall Prevention:   safety round/check completed   fall prevention program maintained  Taken 8/10/2021 1200 by Sahra Carpenter RN  Safety Promotion/Fall Prevention:   safety round/check completed   fall prevention program maintained  Taken 8/10/2021 1000 by Sahra Carpenter RN  Safety Promotion/Fall Prevention:   safety round/check completed   fall prevention program maintained  Taken 8/10/2021 0800 by Sahra Carpenter RN  Safety Promotion/Fall Prevention:   safety round/check completed   fall prevention program maintained     Problem: Adult Inpatient Plan of Care  Goal: Plan of Care Review  Outcome: Ongoing, Progressing  Goal: Patient-Specific Goal (Individualized)  Outcome: Ongoing, Progressing  Goal: Absence of Hospital-Acquired Illness or Injury  Outcome: Ongoing, Progressing  Intervention: Identify and Manage Fall Risk  Recent Flowsheet Documentation  Taken 8/10/2021 1400 by Sahra Carpenter RN  Safety Promotion/Fall Prevention:   safety round/check completed   fall prevention program maintained  Taken 8/10/2021 1200 by Sahra Carpenter  LOKESH Alarcon  Safety Promotion/Fall Prevention:   safety round/check completed   fall prevention program maintained  Taken 8/10/2021 1000 by Sahra Carpenter RN  Safety Promotion/Fall Prevention:   safety round/check completed   fall prevention program maintained  Taken 8/10/2021 0800 by Sahra Carpenter RN  Safety Promotion/Fall Prevention:   safety round/check completed   fall prevention program maintained  Intervention: Prevent Skin Injury  Recent Flowsheet Documentation  Taken 8/10/2021 1600 by Sahra Carpenter RN  Skin Protection: incontinence pads utilized  Taken 8/10/2021 1200 by Sahra Carpenter RN  Skin Protection: incontinence pads utilized  Taken 8/10/2021 0800 by Sahra Carpenter RN  Skin Protection: incontinence pads utilized  Goal: Optimal Comfort and Wellbeing  Outcome: Ongoing, Progressing  Intervention: Provide Person-Centered Care  Recent Flowsheet Documentation  Taken 8/10/2021 1200 by Sahra Carpenter RN  Trust Relationship/Rapport:   care explained   emotional support provided   choices provided   empathic listening provided  Taken 8/10/2021 0800 by Sahra Carpenter RN  Trust Relationship/Rapport:   care explained   choices provided   emotional support provided  Goal: Readiness for Transition of Care  Outcome: Ongoing, Progressing     Problem: Skin Injury Risk Increased  Goal: Skin Health and Integrity  Outcome: Ongoing, Progressing  Intervention: Optimize Skin Protection  Recent Flowsheet Documentation  Taken 8/10/2021 1600 by Sahra Carpenter RN  Pressure Reduction Techniques: weight shift assistance provided  Pressure Reduction Devices: pressure-redistributing mattress utilized  Skin Protection: incontinence pads utilized  Taken 8/10/2021 1200 by Sahra Carpenter RN  Pressure Reduction Techniques: frequent weight shift encouraged  Skin Protection: incontinence pads utilized  Taken 8/10/2021 0800 by Sahra Carpenter RN  Pressure Reduction  Techniques: frequent weight shift encouraged  Skin Protection: incontinence pads utilized   Goal Outcome Evaluation:

## 2021-08-10 NOTE — CASE MANAGEMENT/SOCIAL WORK
Continued Stay Note  DOREEN Amin     Patient Name: Benson Mclean  MRN: [id number]  Today's Date: 8/10/2021    Admit Date: 8/3/2021    Discharge Plan     Row Name 08/10/21 1544       Plan    Plan  DC Plan: Return to [facility] approved 8/10, good for 48 hrs, no PASRR required. Pharm update in.        Chart review only.       Expected Discharge Date and Time     Expected Discharge Date Expected Discharge Time    Aug 6, 2021             Luis Daniel Wolff RN

## 2021-08-10 NOTE — PROGRESS NOTES
[facility] Daily Progress Note    Patient Name: Benson Mclean  : 1950  MRN: [id number]  Primary Care Physician:  Samantha Zabala APRN  Date of admission: 8/3/2021      Subjective      Chief Complaint: Palpitations      Patient Reports Benson Mclean is a 70 y.o. male w/PMH of CHF, CKD, DM, HTN, HLD, CAD, COPD, A. fib, CABG, ANNETTE, CVA, neuropathy, obesity, depression, COVID-19 who presents to [facility] ED with palpitations, patient was discharged from this facility yesterday after a 12-day admission.  Patient states tachycardia progressively worsened after discharge from this facility yesterday, no aggravating or alleviating factors noted.  Patient admits to chest pain, nausea, weakness, palpitations, edema, dyspnea.  Patient denies fever, chills, nausea, vomiting.  As tachycardia did not improve he decided to go to [facility] ED.        Upon arrival to the ED vitals temp 97, HR 93, RR 22, /75, O2 sat 97% on 4 L nasal cannula.  Labs notable for troponin 0.117, proBNP 7648, glucose 198, , K4.5, CO2 32, creatinine 1.91, BUN 68, GFR 35, WBC 7.4, Hgb 8.0, platelets 334, neutrophils 65.9.  EKG shows tachycardia rate 126 repolarization abnormality suggest ischemia diffuse leads.  Chest x-ray shows stable cardiomegaly with pulmonary interstitial edema pattern trace bilateral pleural effusions.  Patient treated in the ED with 10 mg IV Cardizem, IV Cardizem drip initiated.    Readmitted from [facility] after having been discharged 24 hours ago. 21, 3:42 PM EDT     Patient much improved.  Heart rate controlled.  No significant complaints 21, 8:38 PM EDT    After I made the discharge yesterday I was told patient needs a pre-CERT.  Hence discharge canceled 21, 5:06 PM EDT    No new complaints. 21, 2:39 PM EDT    21:HR has been stable and he has no new complaints. No chest pain.     8/10/21:feeling  stable this am however nurse reports episode of bradycardia last night where patient felt dizzy. No chest pain.     Review of Systems   Cardiovascular: Negative for chest pain and palpitations.            Objective      Vitals:   Temp:  [97.3 °F (36.3 °C)-99 °F (37.2 °C)] 98.2 °F (36.8 °C)  Heart Rate:  [] 106  Resp:  [16-20] 18  BP: (109-161)/(44-66) 118/63  Flow (L/min):  [2-4] 3    Physical Exam  Vitals reviewed.   HENT:      Head: Normocephalic.   Eyes:      Pupils: Pupils are equal, round, and reactive to light.   Cardiovascular:      Rate and Rhythm: Rhythm irregular.   Pulmonary:      Effort: No respiratory distress.   Abdominal:      General: There is no distension.      Palpations: Abdomen is soft.   Musculoskeletal:      Right lower leg: Edema present.      Left lower leg: Edema present.   Neurological:      Mental Status: He is alert. Mental status is at baseline.   Psychiatric:         Mood and Affect: Mood normal.        Noted      Result Review    Result Review:  I have personally reviewed the results from the time of this admission to 8/10/2021 15:04 EDT and agree with these findings:  [x]  Laboratory  []  Microbiology  [x]  Radiology  []  EKG/Telemetry   []  Cardiology/Vascular   []  Pathology  [x]  Old records  []  Other:             Assessment/Plan      Brief Patient Summary:  Benson Mclean is a 70 y.o. male who       amiodarone, 200 mg, Oral, Q12H  apixaban, 5 mg, Oral, Q12H  aspirin, 81 mg, Oral, Daily  atorvastatin, 40 mg, Oral, Nightly  carvedilol, 6.25 mg, Oral, BID With Meals  cholecalciferol, 1,000 Units, Oral, Daily  dilTIAZem, 10 mg, Intravenous, Once  dilTIAZem CD, 240 mg, Oral, Q24H  donepezil, 10 mg, Oral, Nightly  DULoxetine, 60 mg, Oral, Daily  [START ON 8/11/2021] furosemide, 40 mg, Oral, BID  gabapentin, 200 mg, Oral, Q12H  hydrALAZINE, 50 mg, Oral, Q12H  insulin lispro, 0-9 Units, Subcutaneous, TID AC  insulin NPH-insulin regular, 30 Units, Subcutaneous, BID With  Meals  ipratropium-albuterol, 3 mL, Nebulization, 4x Daily - RT  isosorbide mononitrate, 60 mg, Oral, BID - Nitrates  sodium chloride, 10 mL, Intravenous, Q12H  vitamin B-12, 1,000 mcg, Oral, Daily             Active Hospital Problems:  Active Hospital Problems    Diagnosis    • **Atrial fibrillation with RVR (CMS/[facility])    • Anemia of renal disease    • Acute on chronic systolic heart failure (CMS/[facility])    • Tobacco use disorder    • CKD (chronic kidney disease) stage 3, GFR 30-59 ml/min (CMS/[facility])    • Mixed hyperlipidemia    • Chronic obstructive pulmonary disease, unspecified (CMS/[facility])    • Flaccid hemiplegia of right dominant side as late effect of cerebral infarction (CMS/[facility])    • Unspecified dementia with behavioral disturbance (CMS/[facility])    • Elevated troponin    • Essential hypertension    • S/P CABG (coronary artery bypass graft)    • Chronic venous hypertension (idiopathic) with ulcer and inflammation of bilateral lower extremity (CMS/[facility])    • Chronic foot ulcer (CMS/[facility])    • Coronary artery disease    • ANNETTE treated with BiPAP    • Diabetic neuropathy (CMS/[facility])      Plan:     Atrial fibrillation:   -on cardizem, amiodarone and Eliquis  -bradycardia noted last night, cardiology to continue current meds   -cardiology following    DILEEP on CKD IV  -Cr higher today 2.67  -hold diuresis today  -nephrology to restart 40mg lasix tomorrow  -BMP in am    Hyperkalemia  -K 5.7  -treated per protocol   -aldactone stopped and may need to d/c of K remains high normal     Chronic HFrEF  -compensated at this time  -diuretics as noted above  -cannot use ACE/ARB d/t Cr above    Essential hypertension:  -Continue home medication hydralazine and imdur     Dyslipidemia:  -Continue home medication atorvastatin 40 mg p.o. nightly     Obesity:  -encourage dietary modifications     Coronary artery disease/history of stent/history of CABG  -Continue ASA  And imdur     COPD:  -no exacerbation      ANNETTE with BiPAP:  -O2 as needed to keep  sats greater than 90%  -May use home CPAP nightly as needed      History of CVA/right hemiaplasia:  -CVA in November 2020     Diabetic neuropathy:  Depression:  Unspecified dementia:  -chronic conditions continue home meds as indicated     DVT prophylaxis:  Medical DVT prophylaxis orders are present.    CODE STATUS:    Code Status: CPR  Medical Interventions (Level of Support Prior to Arrest): Full      Disposition:  I expect patient to be discharged once Cr and electrolytes stabilize and precert obtained.     This patient has been examined wearing appropriate Personal Protective [id number]/[id number]/21      Electronically signed by Jose Guadalupe Corea DO, 08/10/21, 15:04 EDT.  [facility] Hospitalist Team

## 2021-08-10 NOTE — THERAPY TREATMENT NOTE
Subjective: Pt agreeable to therapeutic plan of care.    Objective:     Bed mobility - Mod-A  Transfers - Min-A, Assist x 2 and with rolling walker  Ambulation - N/A or Not attempted. due to low back pain, limitation of lines and significant balance deficits.     Pain: 8 VAS - per pt has been medicated.     Education: Provided education on importance of mobility and skilled verbal / tactile cueing throughout intervention.     Assessment: Benson Mclean presents with functional mobility impairments which indicate the need for skilled intervention. Tolerating session today without incident. Demonstrates significant posterior lean on standing, requiring min-mod A at gait belt + VC's to correct, unable to maintain without assistance. Session limited to sit<>stand x 2 at EOB and lateral ambulation toward HOB as pt is experiencing high levels of back pain and O2 line limits. At end of session, when pt returns to supine, pt SpO2 noted to be in mid to low 80's while on 3L, agreeable to place CPAP for some time. O2 improves to 93%.  Will continue to follow and progress as tolerated.     Plan/Recommendations:   Pt would benefit from Inpatient Rehabilitation placement at discharge from facility and requires no DME at discharge.   Pt desires Inpatient Rehabilitation placement at discharge. Pt cooperative; agreeable to therapeutic recommendations and plan of care.     Basic Mobility 6-click:  Rollin = Total, A lot = 2, A little = 3; 4 = None  Supine>Sit:   1 = Total, A lot = 2, A little = 3; 4 = None   Sit>Stand with arms:  1 = Total, A lot = 2, A little = 3; 4 = None  Bed>Chair:   1 = Total, A lot = 2, A little = 3; 4 = None  Ambulate in room:  1 = Total, A lot = 2, A little = 3; 4 = None  3-5 Steps with railin = Total, A lot = 2, A little = 3; 4 = None  Score: 12    Modified Fredy: 4 = Moderately severe disability (Unable to attend to own bodily needs without assistance, and unable to walk unassisted)      Post-Tx Position: Supine with HOB Elevated, Alarms activated and Call light and personal items within reach     PPE: gloves, surgical mask, eyewear protection

## 2021-08-10 NOTE — PROGRESS NOTES
Referring Provider: Jose Guadalupe Corea DO    Reason for follow-up:  Atrial fibrillation  Cardiomyopathy  Shortness of breath  Bradycardia-new problem     Patient Care Team:  Samantha Zabala APRN as PCP - General  Samantha Zabala APRN as PCP - Family Medicine  Jose G Rm MD as Consulting Physician (Cardiology)    Subjective .  Feeling better     ROS  Patient had bradycardia yesterday but doing better today.    Since I have last seen him, the patient has been without any chest discomfort ,shortness of breath, palpitations, dizziness or syncope.  Denies having any headache ,abdominal pain ,nausea, vomiting , diarrhea constipation, loss of weight or loss of appetite.  Denies having any excessive bruising ,hematuria or blood in the stool.    Review of all systems negative except as indicated    History  Past Medical History:   Diagnosis Date   • Appetite loss    • Brain bleed (CMS/HCC)    • Carpal tunnel syndrome on left     carpal tunnel on left hand   • CHF (congestive heart failure) (CMS/HCC)    • Diabetic neuropathy (CMS/HCC)    • DM2 (diabetes mellitus, type 2) (CMS/HCC)    • History of angina    • Hyperlipidemia    • Hypogonadism in male    • Obesity    • Sleep apnea    • Stroke (CMS/HCC)    • Unsteady gait        Past Surgical History:   Procedure Laterality Date   • ANGIOPLASTY      X2   • APPENDECTOMY     • CARDIAC CATHETERIZATION  11/13/2015   • CARDIAC CATHETERIZATION N/A 5/24/2021    Procedure: Left Heart Cath and coronary angiogram;  Surgeon: Jose G Rm MD;  Location: [facility] CATH INVASIVE LOCATION;  Service: Cardiovascular;  Laterality: N/A;   • CARDIAC CATHETERIZATION  5/24/2021    Procedure: Saphenous Vein Graft;  Surgeon: Jose G Rm MD;  Location: [facility] CATH INVASIVE LOCATION;  Service: Cardiovascular;;   • CARDIAC CATHETERIZATION N/A 5/24/2021    Procedure: Percutaneous Coronary Intervention;  Surgeon: Bernabe Zambrano MD;  Location: [facility] CATH INVASIVE LOCATION;  Service: Cardiology;   Laterality: N/A;   • CARDIAC CATHETERIZATION N/A 5/24/2021    Procedure: Stent NIELS coronary;  Surgeon: Bernabe Zambrano MD;  Location:  [facility] CATH INVASIVE LOCATION;  Service: Cardiology;  Laterality: N/A;   • CARDIAC CATHETERIZATION N/A 5/26/2021    Procedure: Percutaneous Coronary Intervention;  Surgeon: Bernabe Zambrano MD;  Location:  [facility] CATH INVASIVE LOCATION;  Service: Cardiovascular;  Laterality: N/A;   • CARDIAC CATHETERIZATION N/A 5/26/2021    Procedure: Stent NIELS bypass graft;  Surgeon: Bernabe Zambrano MD;  Location:  [facility] CATH INVASIVE LOCATION;  Service: Cardiovascular;  Laterality: N/A;   • CARDIAC ELECTROPHYSIOLOGY PROCEDURE N/A 5/24/2021    Procedure: Temporary Pacemaker;  Surgeon: Bernabe Zambrano MD;  Location:  [facility] CATH INVASIVE LOCATION;  Service: Cardiology;  Laterality: N/A;   • CARDIAC ELECTROPHYSIOLOGY PROCEDURE N/A 5/26/2021    Procedure: Temporary Pacemaker;  Surgeon: Bernabe Zambrano MD;  Location: [facility] CATH INVASIVE LOCATION;  Service: Cardiovascular;  Laterality: N/A;   • CARPAL TUNNEL RELEASE Left 04/29/2018    carpal tunnel- lt hand// other hand surgeries    • CATARACT EXTRACTION, BILATERAL  2002    Dr. Lux Acosta   • CHOLECYSTECTOMY     • COLON RESECTION  2005   • CORONARY ANGIOPLASTY     • CORONARY ANGIOPLASTY WITH STENT PLACEMENT  11/13/2015    PTCA stent to proximal in stent and mid to distal lad   • CORONARY ANGIOPLASTY WITH STENT PLACEMENT  09/16/2016    PTCA stent to mid lad and stent to vein graft to marginal   • CORONARY ARTERY BYPASS GRAFT  2005    @ [facility]   • CYST REMOVAL      cyst removed from scrotum   • FOOT SURGERY Right 07/17/2018   • FOOT SURGERY Left 02/04/2019   • TOE AMPUTATION Right     right toe removed D/T infected cut that went to the bone       Family History   Problem Relation Age of Onset   • Heart disease Mother    • Heart disease Father    • Diabetes Sister    • Heart disease Sister    • Diabetes Brother    • Mental illness Brother        Social History      Tobacco Use   • Smoking status: Former Smoker     Packs/day: 1.00     Years: 60.00     Pack years: 60.00     Types: Cigarettes   • Smokeless tobacco: Never Used   • Tobacco comment: Patient quit smoking 11/2020   Vaping Use   • Vaping Use: Never used   Substance Use Topics   • Alcohol use: No   • Drug use: Yes     Frequency: 1.0 times per week     Types: Marijuana     Comment: socially        Medications Prior to Admission   Medication Sig Dispense Refill Last Dose   • albuterol sulfate  (90 Base) MCG/ACT inhaler Inhale 2 puffs Every 4 (Four) Hours As Needed.      • apixaban (ELIQUIS) 5 MG tablet tablet Take 5 mg by mouth Daily.      • aspirin 81 MG chewable tablet Chew 81 mg Daily.      • atorvastatin (LIPITOR) 40 MG tablet Take 40 mg by mouth Every Night.      • cholecalciferol (VITAMIN D3) 25 MCG (1000 UT) tablet Take 1,000 Units by mouth Daily.      • dextrose (GLUTOSE) 40 % gel Take  by mouth Every 1 (One) Hour As Needed for Low Blood Sugar.      • donepezil (ARICEPT) 10 MG tablet Take 10 mg by mouth Every Night.      • DULoxetine HCl (DRIZALMA) 60 MG capsule Take 60 mg by mouth Daily.      • gabapentin (NEURONTIN) 100 MG capsule Take 2 capsules by mouth 2 (two) times a day. 6 capsule 0    • hydrALAZINE (APRESOLINE) 50 MG tablet Take 50 mg by mouth 2 (two) times a day. Hold for SBP <110      • isosorbide mononitrate (IMDUR) 60 MG 24 hr tablet Take 60 mg by mouth 2 (Two) Times a Day.      • metoprolol succinate XL (TOPROL-XL) 100 MG 24 hr tablet Take 1 tablet by mouth Daily for 30 days. 30 tablet 0    • nitroglycerin (NITROSTAT) 0.4 MG SL tablet Place 1 tablet under the tongue Every 5 (Five) Minutes As Needed for Chest Pain (Only if SBP Greater Than 100). Take no more than 3 doses in 15 minutes. 30 tablet 12    • spironolactone (ALDACTONE) 25 MG tablet Take 1 tablet by mouth Daily for 30 days. 30 tablet 0    • tiotropium (Spiriva HandiHaler) 18 MCG per inhalation capsule Place 1 capsule into inhaler  and inhale Daily. 30 capsule 1    • vitamin B-12 (CYANOCOBALAMIN) 1000 MCG tablet Take 1,000 mcg by mouth Daily.      • [DISCONTINUED] furosemide (LASIX) 40 MG tablet Take 1 tablet by mouth 2 (Two) Times a Day for 30 days. 60 tablet 0        Allergies  Codeine    Scheduled Meds:amiodarone, 200 mg, Oral, Q12H  apixaban, 5 mg, Oral, Q12H  aspirin, 81 mg, Oral, Daily  atorvastatin, 40 mg, Oral, Nightly  carvedilol, 6.25 mg, Oral, BID With Meals  cholecalciferol, 1,000 Units, Oral, Daily  dilTIAZem, 10 mg, Intravenous, Once  dilTIAZem CD, 240 mg, Oral, Q24H  donepezil, 10 mg, Oral, Nightly  DULoxetine, 60 mg, Oral, Daily  furosemide, 80 mg, Oral, BID  gabapentin, 200 mg, Oral, Q12H  hydrALAZINE, 50 mg, Oral, Q12H  insulin lispro, 0-9 Units, Subcutaneous, TID AC  insulin NPH-insulin regular, 30 Units, Subcutaneous, BID With Meals  ipratropium-albuterol, 3 mL, Nebulization, 4x Daily - RT  isosorbide mononitrate, 60 mg, Oral, BID - Nitrates  sodium chloride, 10 mL, Intravenous, Q12H  spironolactone, 25 mg, Oral, Daily  vitamin B-12, 1,000 mcg, Oral, Daily      Continuous Infusions:   PRN Meds:.•  acetaminophen **OR** acetaminophen **OR** acetaminophen  •  aluminum-magnesium hydroxide-simethicone  •  dextrose  •  dextrose  •  glucagon (human recombinant)  •  insulin lispro **AND** insulin lispro  •  melatonin  •  nitroglycerin  •  ondansetron **OR** ondansetron  •  [COMPLETED] Insert peripheral IV **AND** sodium chloride  •  sodium chloride  •  traMADol    Objective     VITAL SIGNS  Vitals:    08/09/21 1834 08/09/21 2217 08/10/21 0208 08/10/21 0618   BP:  124/59 123/54 161/66   BP Location:  Left arm Left arm Left arm   Patient Position:  Lying Lying Lying   Pulse: 51 51 72 100   Resp: 18 18 20 20   Temp:  97.3 °F (36.3 °C) 97.6 °F (36.4 °C) 98.5 °F (36.9 °C)   TempSrc:  Oral Oral Oral   SpO2: 97% 98% 96% 100%   Weight:    115 kg (253 lb 15.5 oz)   Height:           Flowsheet Rows      First Filed Value   Admission Height   "180.3 cm (71\") Documented at 08/03/2021 1733   Admission Weight  113 kg (250 lb) Documented at 08/03/2021 1733            Intake/Output Summary (Last 24 hours) at 8/10/2021 0641  Last data filed at 8/9/2021 1759  Gross per 24 hour   Intake 400 ml   Output 300 ml   Net 100 ml        TELEMETRY: Atrial fibrillation with controlled ventricular response.    Physical Exam:  The patient is alert, oriented and in no distress.  Vital signs as noted above.  Exogenous obesity.  Head and neck revealed no carotid bruits or jugular venous distention.  No thyromegaly or lymphadenopathy is present  Lungs clear.  No wheezing.  Breath sounds are normal bilaterally.  Heart normal first and second heart sounds.  No murmur. No precordial rub is present.  No gallop is present.  Abdomen soft and nontender.  No organomegaly is present.  Extremities with good peripheral pulses without any pedal edema.  Skin warm and dry.  Musculoskeletal system is grossly normal  CNS grossly normal      Results Review:   I reviewed the patient's new clinical results.  Lab Results (last 24 hours)     Procedure Component Value Units Date/Time    Basic Metabolic Panel [[id number]]  (Abnormal) Collected: 08/10/21 0221    Specimen: Blood Updated: 08/10/21 0320     Glucose 101 mg/dL      BUN 71 mg/dL      Creatinine 2.67 mg/dL      Sodium 132 mmol/L      Potassium 5.7 mmol/L      Chloride 89 mmol/L      CO2 33.0 mmol/L      Calcium 8.9 mg/dL      eGFR Non African Amer 24 mL/min/1.73      BUN/Creatinine Ratio 26.6     Anion Gap 10.0 mmol/L     Narrative:      GFR Normal >60  Chronic Kidney Disease <60  Kidney Failure <15      POC Glucose Once [[id number]]  (Normal) Collected: 08/09/21 2103    Specimen: Blood Updated: 08/09/21 2104     Glucose 84 mg/dL      Comment: Serial Number: [id number]:  [id number]       POC Glucose Once [[id number]]  (Abnormal) Collected: 08/09/21 2023    Specimen: Blood Updated: 08/09/21 2024     Glucose 59 mg/dL      Comment: Serial " Number: [id number]:  [id number]       POC Glucose Once [[id number]]  (Abnormal) Collected: 08/09/21 2022    Specimen: Blood Updated: 08/09/21 2023     Glucose 52 mg/dL      Comment: Serial Number: [id number]:  [id number]       POC Glucose Once [[id number]]  (Abnormal) Collected: 08/09/21 1705    Specimen: Blood Updated: 08/09/21 1707     Glucose 119 mg/dL      Comment: Serial Number: [id number]:  [id number]       POC Glucose Once [[id number]]  (Abnormal) Collected: 08/09/21 1532    Specimen: Blood Updated: 08/09/21 1534     Glucose 137 mg/dL      Comment: Serial Number: [id number]:  [id number]       POC Glucose Once [[id number]]  (Abnormal) Collected: 08/09/21 1119    Specimen: Blood Updated: 08/09/21 1121     Glucose 164 mg/dL      Comment: Serial Number: [id number]:  [id number]             Imaging Results (Last 24 Hours)     ** No results found for the last 24 hours. **      LAB RESULTS (LAST 7 DAYS)    CBC  Results from last 7 days   Lab Units 08/06/21 0625 08/05/21 0300 08/04/21 0519 08/03/21  1837   WBC 10*3/mm3 5.70 6.40 7.40 7.40   RBC 10*6/mm3 2.74* 2.45* 2.48* 2.82*   HEMOGLOBIN g/dL 8.1* 7.0* 7.1* 8.0*   HEMATOCRIT % 24.0* 21.0* 21.4* 24.5*   MCV fL 87.4 86.0 86.2 86.6   PLATELETS 10*3/mm3 307 310 312 334       BMP  Results from last 7 days   Lab Units 08/10/21  0221 08/07/21  0314 08/06/21 0625 08/05/21  0300 08/04/21 0519 08/03/21  1837   SODIUM mmol/L 132* 138 139 141 140 139   POTASSIUM mmol/L 5.7* 4.6 4.4 4.6 4.6 4.5   CHLORIDE mmol/L 89* 95* 96* 98 97* 95*   CO2 mmol/L 33.0* 33.0* 34.0* 31.0* 33.0* 32.0*   BUN mg/dL 71* 67* 66* 68* 65* 68*   CREATININE mg/dL 2.67* 2.35* 2.19* 2.04* 1.95* 1.91*   GLUCOSE mg/dL 101* 139* 66 71 171* 198*   MAGNESIUM mg/dL  --   --   --  2.1  --   --    PHOSPHORUS mg/dL  --   --   --  5.1*  --   --        CMP   Results from last 7 days   Lab Units 08/10/21  0221 08/07/21  0314 08/06/21  0625 08/05/21  0300 08/04/21  0519 08/03/21  1839    SODIUM mmol/L 132* 138 139 141 140 139   POTASSIUM mmol/L 5.7* 4.6 4.4 4.6 4.6 4.5   CHLORIDE mmol/L 89* 95* 96* 98 97* 95*   CO2 mmol/L 33.0* 33.0* 34.0* 31.0* 33.0* 32.0*   BUN mg/dL 71* 67* 66* 68* 65* 68*   CREATININE mg/dL 2.67* 2.35* 2.19* 2.04* 1.95* 1.91*   GLUCOSE mg/dL 101* 139* 66 71 171* 198*         BNP        TROPONIN  Results from last 7 days   Lab Units 08/04/21  0519   TROPONIN T ng/mL 0.792*       CoAg        Creatinine Clearance  Estimated Creatinine Clearance: 31.2 mL/min (A) (by C-G formula based on SCr of 2.67 mg/dL (H)).    ABG        Radiology  No radiology results for the last day            EKG              I personally viewed and interpreted the patient's EKG/Telemetry data:    ECHOCARDIOGRAM:    Results for orders placed during the hospital encounter of 07/20/21    Adult Transthoracic Echo Complete With Contrast if Necessary Per Protocol    Interpretation Summary  · Estimated left ventricular EF = 30% Left ventricular systolic function is moderately decreased.    Occasions  Shortness of breath.    Technically satisfactory study.  Mitral valve is structurally normal.  Mild mitral regurgitation.  Tricuspid valve is structurally normal.  Aortic valve is structurally normal.  Pulmonic valve could not be well visualized.  No evidence for tricuspid or aortic regurgitation is seen by Doppler study.  Biatrial and biventricular enlargement is present.  Diffuse left ventricular hypocontractility with ejection fraction of 30%.  Atrial septum is intact.  Aorta is normal.  No pericardial effusion or intracardiac thrombus is seen.    Impression  Mild mitral regurgitation.  Significant biatrial and biventricular enlargement.  Diffuse left ventricular hypocontractility with ejection fraction of 30%.  No pericardial effusion or intracardiac thrombus is seen.          STRESS MYOVIEW:    Cardiolite (Tc-99m Sestamibi) stress test    CARDIAC CATHETERIZATION:            OTHER:         Assessment/Plan      Principal Problem:    Atrial fibrillation with RVR (CMS/HCC)  Active Problems:    S/P CABG (coronary artery bypass graft)    Essential hypertension    Elevated troponin    ANNETTE treated with BiPAP    Mixed hyperlipidemia    Flaccid hemiplegia of right dominant side as late effect of cerebral infarction (CMS/HCC)    Diabetic neuropathy (CMS/HCC)    Unspecified dementia with behavioral disturbance (CMS/HCC)    Coronary artery disease    Chronic venous hypertension (idiopathic) with ulcer and inflammation of bilateral lower extremity (CMS/HCC)    Chronic obstructive pulmonary disease, unspecified (CMS/HCC)    Chronic foot ulcer (CMS/HCC)    CKD (chronic kidney disease) stage 3, GFR 30-59 ml/min (CMS/[facility])    Tobacco use disorder    Acute on chronic systolic heart failure (CMS/[facility])    Anemia of renal disease      [[[[[[[[[[[[[[[[[[[[[[[[[    Impression  ==============  -Increased shortness of breath and palpitations     -Recent acute on chronic congestive heart failure.  Compensated at this time  Recent echocardiogram showed left ventricle dysfunction with ejection fraction of 35%.     -History of right-sided weakness with left MCA territory stroke.-Improved      -status post CABG 2005. status post stent to LAD 2009    Status post stent to LAD 11/13/2015  Status post stent to mid LAD and SVG to marginal branch 09/16/2016.  Status post stent to mid LAD 5/24/2021  Status post stent to SVG to RCA 5/26/2021     Cardiac catheterization 5/24/2021 revealed  Left ventricle angiogram was not performed due to pre-existing renal dysfunction.  Left main coronary artery normal.  Left anterior descending artery has mid segment lengthy 90% disease within the previously placed stent.  Distal left into descending artery has diffuse disease.  Circumflex coronary artery is totally occluded.  (Chronic)  Right coronary artery is chronically occluded.  SVG to marginal branch (jump graft to OM1 and OM 2) is totally occluded (new finding  compared to 2015.  SVG to PDA has distal radiolucency.  However no definite obstructive disease is present.       -status post myocardial infarction 2000 and 2002 .      -history of intermittent atrial fibrillation-maintaining sinus rhythm     Transesophageal echocardiogram 11/30/2020.  Biatrial enlargement.  Smoking effect in the left atrium and left ventricle and left atrial appendage.  Mild mitral and aortic regurgitation.  Left ventricle enlargement with diffuse hypocontractility with ejection fraction of 35 to 40%.     Echocardiogram 11/27/2020 revealed left atrial enlargement left ventricle dysfunction with ejection fraction of 40%.  Negative bubble study.      -diabetes hypertension and sleep apnea in history of renal dysfunction .  Recent BUN/creatinine 38/1.36     -status post colon surgery (partial colectomy) appendectomy cholecystectomy right 4th toe removal and carpal tunnel surgery      -continued smoking 1 pack per day -abstinence from smoking      -allergy to codeine.  ============   Plan  ================  Atrial fibrillation-chronic  Patient is off intravenous Cardizem.  Patient had bradycardia yesterday-improved  Continue Coreg and p.o. Cardizem and amiodarone 200 mg once a day.  Observe for any further episodes of bradycardia.    Troponin level is slightly elevated  0.11  0.41  No bump up in troponin.    Significant renal dysfunction-chronic  BUN/68/1.91-8/4/2021.  BUN 64 creatinine 1.95-8/2/2021  68/2.04-8/5/2021  67/2.35-8/9/2021  71/2.67-8/10/2021    Hyperkalemia  k 5.7  Patient is on spironolactone.  May have to hold spironolactone if trend for hyperkalemia is present.  Renal follow-up in progress.    Anemia  hgb 7.1  7-8/5/2021  7 PRBC  Receiving iron transfusion.  8.1-8/9/2021     Status post CABG  Patient is not having any angina pectoris  Status post stent to LAD 5/24/2021.  Status post stent to SVG to RCA 5/26/2021.       Anticoagulation  Patient is on Eliquis.    Medications were  reviewed and updated.  Current medications include amiodarone 200 mg a day Eliquis aspirin atorvastatin Coreg Cardizem CD Lasix gabapentin hydralazine Imdur spironolactone.    Further plan will depend on patient's progress.   ]]]]]]]]]]]]]]]]]]]]]]           Jose G Rm MD  08/10/21  06:41 EDT

## 2021-08-10 NOTE — PLAN OF CARE
Demonstrates significant posterior lean on standing, requiring min-mod A at gait belt + VC's to correct, unable to maintain without assistance. Session limited to sit<>stand x 2 at EOB and lateral ambulation toward HOB as pt is experiencing high levels of back pain and O2 line limits. At end of session, when pt returns to supine, pt SpO2 noted to be in mid to low 80's while on 3L, agreeable to place CPAP for some time. O2 improves to 93%.  Will continue to follow and progress as tolerated.

## 2021-08-10 NOTE — PLAN OF CARE
Goal Outcome Evaluation:  Plan of Care Reviewed With: patient           Outcome Summary: Pt is a 71 y/o male presenting to [facility] form [facility] secondary to tachycardia. Pt recent d/c from [facility] on 8/7. Pt required mod A for supine to sit, CGA-min A for sitting balance, max Ax2 for transfer to chair secondary to R lean and poor motor planning. Pt on 3 L NC stating 82-88% sitting at EOB. Pt titrated to 5L and stating 92% for transfer. OT recommending pt return to rehab. OT will follow at [facility].

## 2021-08-10 NOTE — PLAN OF CARE
Problem: Arrhythmia/Dysrhythmia  Goal: Normalized Cardiac Rhythm  Outcome: Ongoing, Progressing     Problem: Fall Injury Risk  Goal: Absence of Fall and Fall-Related Injury  Outcome: Ongoing, Progressing  Intervention: Identify and Manage Contributors to Fall Injury Risk  Recent Flowsheet Documentation  Taken 8/9/2021 2200 by Guy Castellanos RN  Medication Review/Management: medications reviewed  Taken 8/9/2021 2000 by Guy Castellanos RN  Medication Review/Management: medications reviewed  Intervention: Promote Injury-Free Environment  Recent Flowsheet Documentation  Taken 8/10/2021 0400 by Guy Castellanos, RN  Safety Promotion/Fall Prevention:   safety round/check completed   room organization consistent   nonskid shoes/slippers when out of bed   lighting adjusted   fall prevention program maintained   clutter free environment maintained   assistive device/personal items within reach  Taken 8/10/2021 0200 by Guy Castellanos, RN  Safety Promotion/Fall Prevention:   safety round/check completed   room organization consistent   nonskid shoes/slippers when out of bed   lighting adjusted   fall prevention program maintained   clutter free environment maintained   assistive device/personal items within reach  Taken 8/10/2021 0000 by Guy Castellanos, RN  Safety Promotion/Fall Prevention:   safety round/check completed   room organization consistent   nonskid shoes/slippers when out of bed   lighting adjusted   fall prevention program maintained   clutter free environment maintained   assistive device/personal items within reach  Taken 8/9/2021 2200 by Guy Castellanos, RN  Safety Promotion/Fall Prevention:   safety round/check completed   room organization consistent   nonskid shoes/slippers when out of bed   lighting adjusted   fall prevention program maintained   clutter free environment maintained   assistive device/personal items within reach  Taken 8/9/2021 2000 by Guy Castellanos, RN  Safety  Promotion/Fall Prevention:   safety round/check completed   room organization consistent   nonskid shoes/slippers when out of bed   lighting adjusted   fall prevention program maintained   clutter free environment maintained   assistive device/personal items within reach     Problem: Adult Inpatient Plan of Care  Goal: Plan of Care Review  Outcome: Ongoing, Progressing  Flowsheets (Taken 8/10/2021 0417)  Progress: improving  Plan of Care Reviewed With: patient  Goal: Patient-Specific Goal (Individualized)  Outcome: Ongoing, Progressing  Goal: Absence of Hospital-Acquired Illness or Injury  Outcome: Ongoing, Progressing  Intervention: Identify and Manage Fall Risk  Recent Flowsheet Documentation  Taken 8/10/2021 0400 by Guy Castellanos, RN  Safety Promotion/Fall Prevention:   safety round/check completed   room organization consistent   nonskid shoes/slippers when out of bed   lighting adjusted   fall prevention program maintained   clutter free environment maintained   assistive device/personal items within reach  Taken 8/10/2021 0200 by Guy Castellanos, RN  Safety Promotion/Fall Prevention:   safety round/check completed   room organization consistent   nonskid shoes/slippers when out of bed   lighting adjusted   fall prevention program maintained   clutter free environment maintained   assistive device/personal items within reach  Taken 8/10/2021 0000 by Guy Castellanos, RN  Safety Promotion/Fall Prevention:   safety round/check completed   room organization consistent   nonskid shoes/slippers when out of bed   lighting adjusted   fall prevention program maintained   clutter free environment maintained   assistive device/personal items within reach  Taken 8/9/2021 2200 by Guy Castellanos, RN  Safety Promotion/Fall Prevention:   safety round/check completed   room organization consistent   nonskid shoes/slippers when out of bed   lighting adjusted   fall prevention program maintained   clutter free  environment maintained   assistive device/personal items within reach  Taken 8/9/2021 2000 by Guy Castellanos RN  Safety Promotion/Fall Prevention:   safety round/check completed   room organization consistent   nonskid shoes/slippers when out of bed   lighting adjusted   fall prevention program maintained   clutter free environment maintained   assistive device/personal items within reach  Intervention: Prevent Skin Injury  Recent Flowsheet Documentation  Taken 8/9/2021 2000 by Guy Castellanos RN  Skin Protection:   adhesive use limited   incontinence pads utilized  Intervention: Prevent Infection  Recent Flowsheet Documentation  Taken 8/10/2021 0400 by Guy Castellanos RN  Infection Prevention:   personal protective equipment utilized   visitors restricted/screened  Taken 8/10/2021 0200 by Guy Castellanos RN  Infection Prevention:   personal protective equipment utilized   visitors restricted/screened  Taken 8/10/2021 0000 by Guy Castellanos RN  Infection Prevention:   personal protective equipment utilized   visitors restricted/screened  Taken 8/9/2021 2200 by Guy Castellanos RN  Infection Prevention:   personal protective equipment utilized   visitors restricted/screened  Taken 8/9/2021 2000 by Guy Castellanos RN  Infection Prevention:   personal protective equipment utilized   visitors restricted/screened  Goal: Optimal Comfort and Wellbeing  Outcome: Ongoing, Progressing  Intervention: Provide Person-Centered Care  Recent Flowsheet Documentation  Taken 8/10/2021 0400 by Guy Castellanos RN  Trust Relationship/Rapport: care explained  Taken 8/10/2021 0000 by Guy Castellanos RN  Trust Relationship/Rapport: care explained  Taken 8/9/2021 2000 by Guy Castellanos RN  Trust Relationship/Rapport:   care explained   choices provided   thoughts/feelings acknowledged  Goal: Readiness for Transition of Care  Outcome: Ongoing, Progressing     Problem: Skin Injury Risk Increased  Goal: Skin  Health and Integrity  Outcome: Ongoing, Progressing  Intervention: Optimize Skin Protection  Recent Flowsheet Documentation  Taken 8/9/2021 2000 by Guy Castellanos, RN  Pressure Reduction Techniques: frequent weight shift encouraged  Pressure Reduction Devices: pressure-redistributing mattress utilized  Skin Protection:   adhesive use limited   incontinence pads utilized   Goal Outcome Evaluation:  Plan of Care Reviewed With: patient        Progress: improving

## 2021-08-10 NOTE — PROGRESS NOTES
"RENAL/KCC:     LOS: 6 days    Patient Care Team:  Samantha Zabala APRN as PCP - General  Samantha Zabala APRN as PCP - Family Medicine  Jose G Rm MD as Consulting Physician (Cardiology)    Chief Complaint:  Palpitations    Subjective     Interval History:   Patient is doing little better today, not as dizzy  Good appetite with no nausea or vomiting  No shortness of breath chest pain or edema  Voiding well with no dysuria  Denies new complaints          Objective     Vital Sign Min/Max for last 24 hours  Temp  Min: 97.3 °F (36.3 °C)  Max: 99 °F (37.2 °C)   BP  Min: 105/44  Max: 161/66   Pulse  Min: 44  Max: 107   Resp  Min: 11  Max: 20   SpO2  Min: 90 %  Max: 100 %   Flow (L/min)  Min: 2  Max: 4   Weight  Min: 115 kg (253 lb 15.5 oz)  Max: 115 kg (253 lb 15.5 oz)     Flowsheet Rows      First Filed Value   Admission Height  180.3 cm (71\") Documented at 08/03/2021 1733   Admission Weight  113 kg (250 lb) Documented at 08/03/2021 1733          I/O this shift:  In: 100 [P.O.:100]  Out: -   I/O last 3 completed shifts:  In: 400 [P.O.:400]  Out: 450 [Urine:450]    Physical Exam:  GEN: Awake, NAD  ENT: PERRL, EOMI, MMM  NECK: Supple, no JVD  CHEST: CTAB, no W/R/C  CV: RRR, no M/G/R  ABD: Soft, NT, +BS  SKIN: Warm and Dry  NEURO: CN's intact  Trace lower extremity edema    WBC No results found for: WBC   HGB No results found for: HGB   HCT No results found for: HCT   Platlets No results found for: LABPLAT   MCV No results found for: MCV     Previous labs reviewed    Sodium Sodium   Date Value Ref Range Status   08/10/2021 132 (L) 136 - 145 mmol/L Final      Potassium Potassium   Date Value Ref Range Status   08/10/2021 5.7 (H) 3.5 - 5.2 mmol/L Final      Chloride Chloride   Date Value Ref Range Status   08/10/2021 89 (L) 98 - 107 mmol/L Final      CO2 CO2   Date Value Ref Range Status   08/10/2021 33.0 (H) 22.0 - 29.0 mmol/L Final      BUN BUN   Date Value Ref Range Status   08/10/2021 71 (H) 8 - 23 mg/dL Final    "   Creatinine Creatinine   Date Value Ref Range Status   08/10/2021 2.67 (H) 0.76 - 1.27 mg/dL Final      Calcium Calcium   Date Value Ref Range Status   08/10/2021 8.9 8.6 - 10.5 mg/dL Final      PO4 No results found for: CAPO4   Albumin No results found for: ALBUMIN   Magnesium No results found for: MG   Uric Acid No results found for: URICACID        Results Review:     I reviewed the patient's new clinical results.   Old chart reviewed showing CKD stage IV, previous baseline creatinine around 1.6  Chest x-ray personally reviewed showing pulmonary edema pattern, cardiomegaly consistent with CHF      Basic Metabolic Panel    Sodium Sodium   Date Value Ref Range Status   08/10/2021 132 (L) 136 - 145 mmol/L Final      Potassium Potassium   Date Value Ref Range Status   08/10/2021 5.7 (H) 3.5 - 5.2 mmol/L Final      Chloride Chloride   Date Value Ref Range Status   08/10/2021 89 (L) 98 - 107 mmol/L Final      Bicarbonate No results found for: PLASMABICARB   BUN BUN   Date Value Ref Range Status   08/10/2021 71 (H) 8 - 23 mg/dL Final      Creatinine Creatinine   Date Value Ref Range Status   08/10/2021 2.67 (H) 0.76 - 1.27 mg/dL Final      Calcium Calcium   Date Value Ref Range Status   08/10/2021 8.9 8.6 - 10.5 mg/dL Final      Glucose      No components found for: GLUCOSE.*         amiodarone, 200 mg, Oral, Q12H  apixaban, 5 mg, Oral, Q12H  aspirin, 81 mg, Oral, Daily  atorvastatin, 40 mg, Oral, Nightly  carvedilol, 6.25 mg, Oral, BID With Meals  cholecalciferol, 1,000 Units, Oral, Daily  dilTIAZem, 10 mg, Intravenous, Once  dilTIAZem CD, 240 mg, Oral, Q24H  donepezil, 10 mg, Oral, Nightly  DULoxetine, 60 mg, Oral, Daily  furosemide, 40 mg, Oral, BID  gabapentin, 200 mg, Oral, Q12H  hydrALAZINE, 50 mg, Oral, Q12H  insulin lispro, 0-9 Units, Subcutaneous, TID AC  insulin NPH-insulin regular, 30 Units, Subcutaneous, BID With Meals  ipratropium-albuterol, 3 mL, Nebulization, 4x Daily - RT  isosorbide mononitrate, 60 mg,  Oral, BID - Nitrates  sodium chloride, 500 mL, Intravenous, Once  sodium chloride, 10 mL, Intravenous, Q12H  vitamin B-12, 1,000 mcg, Oral, Daily           Medication Review: Reviewed    Assessment/Plan       Atrial fibrillation with RVR (CMS/[facility])    S/P CABG (coronary artery bypass graft)    Essential hypertension    Elevated troponin    ANNETTE treated with BiPAP    Mixed hyperlipidemia    Flaccid hemiplegia of right dominant side as late effect of cerebral infarction (CMS/[facility])    Diabetic neuropathy (CMS/HCC)    Unspecified dementia with behavioral disturbance (CMS/HCC)    Coronary artery disease    Chronic venous hypertension (idiopathic) with ulcer and inflammation of bilateral lower extremity (CMS/[facility])    Chronic obstructive pulmonary disease, unspecified (CMS/[facility])    Chronic foot ulcer (CMS/[facility])    CKD (chronic kidney disease) stage 3, GFR 30-59 ml/min (CMS/HCC)    Tobacco use disorder    Acute on chronic systolic heart failure (CMS/[facility])    Anemia of renal disease      1. DILEEP, creatinine worse again today, up from 2.0 -> 2.6 today, may be a bit over diuresed  2. CKD IV, estimated new baseline creatinine 2.0 after diuresis  3. Pulmonary edema, O2 sats improved, still on 4 L O2  4. Tachycardia, improved  5. CHF  6. T2DM, on insulin with CKD  7. Hypertension, good control  8.  New, hyperkalemia       PLAN:  Renal function worse today and K 5.7  Hold diuretics today and decrease Lasix to 40 mg twice a day starting tomorrow a.m., give 500 cc saline bolus today  Will treat potassium medically with Kayexalate, D50/insulin, calcium, bicarb  Recheck potassium level in 4 hours  Hold Aldactone due to hyperkalemia  Rate control per cardiology  Repeat labs in a.m.  Recommend holding discharge to [facility] until renal function and labs stabilize           Crow Vences M.D.  [facility]  [phone]

## 2021-08-10 NOTE — NURSING NOTE
Patient's blood sugar 52, RN not notified, patient given apple juice once result was seen.  Recheck sugar was 84.

## 2021-08-10 NOTE — THERAPY EVALUATION
Patient Name: Benson Mclean  : 1950    MRN: [id number]                              Today's Date: 8/10/2021       Admit Date: 8/3/2021    Visit Dx:     ICD-10-CM ICD-9-CM   1. Tachycardia  R00.0 785.0   2. Dyspnea, unspecified type  R06.00 786.09     Patient Active Problem List   Diagnosis   • Diabetes mellitus due to underlying condition without complication, without long-term current use of insulin (CMS/[facility])   • S/P CABG (coronary artery bypass graft)   • Essential hypertension   • Dyslipidemia   • Fall   • Elevated troponin   • Closed fracture of seventh thoracic vertebra (CMS/[facility])   • Cerebrovascular accident (CVA) (CMS/[facility])   • Other headache syndrome   • Right hand weakness   • Neurologic gait dysfunction   • Tobacco user   • Status post coronary artery stent placement   • ANNETTE treated with BiPAP   • Right hemiplegia (CMS/[facility])   • Major depressive disorder, recurrent, mild (CMS/[facility])   • Immobility syndrome   • Lymphadenopathy, mediastinal   • Mixed hyperlipidemia   • History of cerebrovascular accident   • History of amputation of lesser toe (CMS/[facility])   • Flaccid hemiplegia of right dominant side as late effect of cerebral infarction (CMS/[facility])   • Diabetic neuropathy (CMS/[facility])   • Depressive disorder   • Unspecified dementia with behavioral disturbance (CMS/[facility])   • COVID-19   • Coronary artery disease   • Constipation   • Obesity   • Cognitive communication deficit   • Vitamin D deficiency   • Chronic venous hypertension (idiopathic) with ulcer and inflammation of bilateral lower extremity (CMS/[facility])   • Chronic obstructive pulmonary disease, unspecified (CMS/[facility])   • Chronic foot ulcer (CMS/[facility])   • Chronic left-sided low back pain without sciatica   • Acute congestive heart failure (CMS/[facility])   • Paroxysmal atrial fibrillation (CMS/[facility])   • Arthritis   • Acute renal insufficiency   • Atrial fibrillation with rapid ventricular response (CMS/[facility])   • Type 2 diabetes mellitus with hyperglycemia  (CMS/[facility])   • Abnormal nuclear stress test   • Cytokine release syndrome, grade 1   • Unstable angina (CMS/[facility])   • Acute respiratory distress   • CKD (chronic kidney disease) stage 3, GFR 30-59 ml/min (CMS/HCC)   • Tobacco use disorder   • Acute on chronic systolic heart failure (CMS/HCC)   • Nonsustained ventricular tachycardia (CMS/HCC)   • Atrial fibrillation with RVR (CMS/HCC)   • Tachycardia   • A-fib (CMS/[facility])   • Anemia of renal disease     Past Medical History:   Diagnosis Date   • Appetite loss    • Brain bleed (CMS/HCC)    • Carpal tunnel syndrome on left     carpal tunnel on left hand   • CHF (congestive heart failure) (CMS/HCC)    • Diabetic neuropathy (CMS/[facility])    • DM2 (diabetes mellitus, type 2) (CMS/[facility])    • History of angina    • Hyperlipidemia    • Hypogonadism in male    • Obesity    • Sleep apnea    • Stroke (CMS/[facility])    • Unsteady gait      Past Surgical History:   Procedure Laterality Date   • ANGIOPLASTY      X2   • APPENDECTOMY     • CARDIAC CATHETERIZATION  11/13/2015   • CARDIAC CATHETERIZATION N/A 5/24/2021    Procedure: Left Heart Cath and coronary angiogram;  Surgeon: Jose G Rm MD;  Location: [facility] CATH INVASIVE LOCATION;  Service: Cardiovascular;  Laterality: N/A;   • CARDIAC CATHETERIZATION  5/24/2021    Procedure: Saphenous Vein Graft;  Surgeon: Jose G Rm MD;  Location: [facility] CATH INVASIVE LOCATION;  Service: Cardiovascular;;   • CARDIAC CATHETERIZATION N/A 5/24/2021    Procedure: Percutaneous Coronary Intervention;  Surgeon: Bernabe Zambrano MD;  Location: [facility] CATH INVASIVE LOCATION;  Service: Cardiology;  Laterality: N/A;   • CARDIAC CATHETERIZATION N/A 5/24/2021    Procedure: Stent NIELS coronary;  Surgeon: Bernabe Zambrano MD;  Location:  [facility] CATH INVASIVE LOCATION;  Service: Cardiology;  Laterality: N/A;   • CARDIAC CATHETERIZATION N/A 5/26/2021    Procedure: Percutaneous Coronary Intervention;  Surgeon: Bernabe Zambrano MD;  Location: [facility] CATH INVASIVE  LOCATION;  Service: Cardiovascular;  Laterality: N/A;   • CARDIAC CATHETERIZATION N/A 5/26/2021    Procedure: Stent NIELS bypass graft;  Surgeon: Bernabe Zambrano MD;  Location: [facility] CATH INVASIVE LOCATION;  Service: Cardiovascular;  Laterality: N/A;   • CARDIAC ELECTROPHYSIOLOGY PROCEDURE N/A 5/24/2021    Procedure: Temporary Pacemaker;  Surgeon: Bernabe Zambrano MD;  Location: [facility] CATH INVASIVE LOCATION;  Service: Cardiology;  Laterality: N/A;   • CARDIAC ELECTROPHYSIOLOGY PROCEDURE N/A 5/26/2021    Procedure: Temporary Pacemaker;  Surgeon: Bernabe Zambrano MD;  Location: [facility] CATH INVASIVE LOCATION;  Service: Cardiovascular;  Laterality: N/A;   • CARPAL TUNNEL RELEASE Left 04/29/2018    carpal tunnel- lt hand// other hand surgeries    • CATARACT EXTRACTION, BILATERAL  2002    Dr. Lux Acosta   • CHOLECYSTECTOMY     • COLON RESECTION  2005   • CORONARY ANGIOPLASTY     • CORONARY ANGIOPLASTY WITH STENT PLACEMENT  11/13/2015    PTCA stent to proximal in stent and mid to distal lad   • CORONARY ANGIOPLASTY WITH STENT PLACEMENT  09/16/2016    PTCA stent to mid lad and stent to vein graft to marginal   • CORONARY ARTERY BYPASS GRAFT  2005    @ [facility]   • CYST REMOVAL      cyst removed from scrotum   • FOOT SURGERY Right 07/17/2018   • FOOT SURGERY Left 02/04/2019   • TOE AMPUTATION Right     right toe removed D/T infected cut that went to the bone     General Information     Row Name 08/10/21 1109          OT Time and Intention    Document Type  evaluation  -BL     Mode of Treatment  individual therapy;occupational therapy  -BL     Row Name 08/10/21 1107          General Information    Patient Profile Reviewed  yes  -BL     Prior Level of Function  independent:;ADL's;all household mobility  -BL     Existing Precautions/Restrictions  fall  -BL     Row Name 08/10/21 1102          Living Environment    Lives With  -- Lives with spouse. Has been at [facility] with goal to return home once stronger  -BL     Row Name  08/10/21 1109          Cognition    Orientation Status (Cognition)  oriented x 3  -     Row Name 08/10/21 1109          Safety Issues, Functional Mobility    Impairments Affecting Function (Mobility)  endurance/activity tolerance;pain;strength  -     Comment, Safety Issues/Impairments (Mobility)  gait belt and non-slip socks utilized  -       User Key  (r) = Recorded By, (t) = Taken By, (c) = Cosigned By    Initials Name Provider Type     Loli Constantino OT Occupational Therapist          Mobility/ADL's     Row Name 08/10/21 1110          Bed Mobility    Bed Mobility  supine-sit  -     Supine-Sit [place] (Bed Mobility)  moderate assist (50% patient effort)  -     Assistive Device (Bed Mobility)  bed rails;head of bed elevated  -     Row Name 08/10/21 1110          Transfers    Sit-Stand [place] (Transfers)  2 person assist;maximum assist (25% patient effort)  -     Row Name 08/10/21 1110          Sit-Stand Transfer    Assistive Device (Sit-Stand Transfers)  walker, front-wheeled  -     Row Name 08/10/21 1110          Activities of Daily Living    BADL Assessment/Intervention  lower body dressing  -[facility] Name 08/10/21 1110          Lower Body Dressing Assessment/Training    [place] Level (Lower Body Dressing)  don;dependent (less than 25% patient effort)  -     Position (Lower Body Dressing)  edge of bed sitting  -       User Key  (r) = Recorded By, (t) = Taken By, (c) = Cosigned By    Initials Name Provider Type     Loli Constantino OT Occupational Therapist        Obj/Interventions     Row Name 08/10/21 1111          Sensory Assessment (Somatosensory)    Sensory Assessment (Somatosensory)  sensation intact  -[facility] Name 08/10/21 1111          Vision Assessment/Intervention    Visual Impairment/Limitations  corrective lenses for distance  -     Row Name 08/10/21 1111          Range of Motion Comprehensive    General Range of Motion  bilateral upper extremity ROM  WFL  -BL     Row Name 08/10/21 1111          Strength Comprehensive (MMT)    Comment, General Manual Muscle Testing (MMT) Assessment  BUE grossly 4/5  -BL     Row Name 08/10/21 1111          Balance    Balance Assessment  sitting static balance;sitting dynamic balance;standing static balance;standing dynamic balance  -BL     Static Sitting Balance  mild impairment  -BL     Dynamic Sitting Balance  mild impairment  -BL     Static Standing Balance  moderate impairment  -BL     Dynamic Standing Balance  moderate impairment  -BL       User Key  (r) = Recorded By, (t) = Taken By, (c) = Cosigned By    Initials Name Provider Type     Loli Constantino, OT Occupational Therapist        Goals/Plan     Row Name 08/10/21 1117          Transfer Goal 1 (OT)    Activity/Assistive Device (Transfer Goal 1, OT)  sit-to-stand/stand-to-sit;toilet  -BL     [place] Level/Cues Needed (Transfer Goal 1, OT)  minimum assist (75% or more patient effort)  -BL     Time Frame (Transfer Goal 1, OT)  long term goal (LTG);2 weeks  -     Row Name 08/10/21 1117          Dressing Goal 1 (OT)    Activity/Device (Dressing Goal 1, OT)  dressing skills, all  -BL     [place]/Cues Needed (Dressing Goal 1, OT)  contact guard assist  -BL     Time Frame (Dressing Goal 1, OT)  long term goal (LTG);2 weeks  -     Row Name 08/10/21 1117          Grooming Goal 1 (OT)    Activity/Device (Grooming Goal 1, OT)  grooming skills, all  -BL     [place] (Grooming Goal 1, OT)  contact guard assist  -BL     Time Frame (Grooming Goal 1, OT)  long term goal (LTG);2 weeks  -     Row Name 08/10/21 1117          Therapy Assessment/Plan (OT)    Planned Therapy Interventions (OT)  activity tolerance training;BADL retraining;cognitive/visual perception retraining;functional balance retraining;IADL retraining;neuromuscular control/coordination retraining;occupation/activity based interventions;passive ROM/stretching;patient/caregiver  education/training;ROM/therapeutic exercise;strengthening exercise;transfer/mobility retraining  -       User Key  (r) = Recorded By, (t) = Taken By, (c) = Cosigned By    Initials Name Provider Type     Loli Constantino, JAREK Occupational Therapist        Clinical Impression     Row Name 08/10/21 1112          Pain Assessment    Additional Documentation  Pain Scale: Numbers Pre/Post-Treatment (Group)  -[facility] Name 08/10/21 1112          Pain Scale: Numbers Pre/Post-Treatment    Pretreatment Pain Rating  3/10  -BL     Posttreatment Pain Rating  3/10  -BL     Pain Location - Orientation  lower  -BL     Pain Location  back  -BL     Pain Intervention(s)  Repositioned  -     Row Name 08/10/21 1112          Plan of Care Review    Plan of Care Reviewed With  patient  -     Outcome Summary  Pt is a 71 y/o male presenting to [facility] form [facility] secondary to tachycardia. Pt recent d/c from [facility] on 8/7. Pt required mod A for supine to sit, CGA-min A for sitting balance, max Ax2 for transfer to chair secondary to R lean and poor motor planning. Pt on 3 L NC stating 82-88% sitting at EOB. Pt titrated to 5L and stating 92% for transfer. OT recommending pt return to rehab. OT will follow at [facility].  -     Row Name 08/10/21 1112          Therapy Assessment/Plan (OT)    Therapy Frequency (OT)  5 times/wk  -     Row Name 08/10/21 1112          Therapy Plan Review/Discharge Plan (OT)    Anticipated Discharge Disposition (OT)  inpatient rehabilitation facility  -     Row Name 08/10/21 1112          Vital Signs    Pre SpO2 (%)  88  -BL     O2 Delivery Pre Treatment  supplemental O2  -BL     Intra SpO2 (%)  92  -BL     O2 Delivery Intra Treatment  supplemental O2  -BL     Post SpO2 (%)  91  -BL     O2 Delivery Post Treatment  supplemental O2  -BL     Pre Patient Position  Supine  -BL     Intra Patient Position  Sitting  -BL     Post Patient Position  Sitting  -BL     Row Name 08/10/21 1112          Positioning and  Restraints    Pre-Treatment Position  in bed  -BL     Post Treatment Position  chair  -BL     In Chair  notified nsg;sitting;call light within reach;encouraged to call for assist;exit alarm on  -BL       User Key  (r) = Recorded By, (t) = Taken By, (c) = Cosigned By    Initials Name Provider Type     Loli Constantino OT Occupational Therapist        Outcome Measures    No documentation.         Occupational Therapy Education                 Title: PT OT SLP Therapies (In Progress)     Topic: Occupational Therapy (In Progress)     Point: ADL training (Done)     Description:   Instruct learner(s) on proper safety adaptation and remediation techniques during self care or transfers.   Instruct in proper use of assistive devices.              Learning Progress Summary           Patient Acceptance, E,TB, VU by  at 8/10/2021 1119                   Point: Home exercise program (Not Started)     Description:   Instruct learner(s) on appropriate technique for monitoring, assisting and/or progressing therapeutic exercises/activities.              Learner Progress:  Not documented in this visit.          Point: Precautions (Not Started)     Description:   Instruct learner(s) on prescribed precautions during self-care and functional transfers.              Learner Progress:  Not documented in this visit.          Point: Body mechanics (Not Started)     Description:   Instruct learner(s) on proper positioning and spine alignment during self-care, functional mobility activities and/or exercises.              Learner Progress:  Not documented in this visit.                      User Key     Initials Effective Dates Name Provider Type Discipline     04/13/21 -  Loli Constantino OT Occupational Therapist OT              OT Recommendation and Plan  Planned Therapy Interventions (OT): activity tolerance training, BADL retraining, cognitive/visual perception retraining, functional balance retraining, IADL retraining,  neuromuscular control/coordination retraining, occupation/activity based interventions, passive ROM/stretching, patient/caregiver education/training, ROM/therapeutic exercise, strengthening exercise, transfer/mobility retraining  Therapy Frequency (OT): 5 times/wk  Plan of Care Review  Plan of Care Reviewed With: patient  Outcome Summary: Pt is a 71 y/o male presenting to [facility] secondary to tachycardia. Pt recent d/c from [facility] on 8/7. Pt required mod A for supine to sit, CGA-min A for sitting balance, max Ax2 for transfer to chair secondary to R lean and poor motor planning. Pt on 3 L NC stating 82-88% sitting at EOB. Pt titrated to 5L and stating 92% for transfer. OT recommending pt return to rehab. OT will follow at [facility].     Time Calculation:   Time Calculation- OT     Row Name 08/10/21 1120             Time Calculation- OT    OT Start Time  0845  -BL      OT Stop Time  0912  -      OT Time Calculation (min)  27 min  -      OT Received On  08/10/21  -      OT - Next Appointment  08/11/21  -      OT Goal Re-Cert Due Date  08/24/21  -        User Key  (r) = Recorded By, (t) = Taken By, (c) = Cosigned By    Initials Name Provider Type    BL Nico Montgomery OT Occupational Therapist        Therapy Charges for Today     Code Description Service Date Service Provider Modifiers Qty    [id number]  OT EVAL MOD COMPLEXITY 4 8/10/2021 Nico Montgomery OT GO 1               NICO MONTGOMERY OT  8/10/2021

## 2021-08-11 ENCOUNTER — APPOINTMENT (OUTPATIENT)
Dept: GENERAL RADIOLOGY | Facility: HOSPITAL | Age: 71
End: 2021-08-11

## 2021-08-11 LAB
ALBUMIN SERPL-MCNC: 3.7 G/DL (ref 3.5–5.2)
ANION GAP SERPL CALCULATED.3IONS-SCNC: 10 MMOL/L (ref 5–15)
ARTERIAL PATENCY WRIST A: POSITIVE
ATMOSPHERIC PRESS: ABNORMAL MM[HG]
BASE EXCESS BLDA CALC-SCNC: 5.4 MMOL/L (ref 0–3)
BDY SITE: ABNORMAL
BUN SERPL-MCNC: 72 MG/DL (ref 8–23)
BUN/CREAT SERPL: 25.6 (ref 7–25)
CALCIUM SPEC-SCNC: 8.9 MG/DL (ref 8.6–10.5)
CHLORIDE SERPL-SCNC: 92 MMOL/L (ref 98–107)
CO2 BLDA-SCNC: 33.2 MMOL/L (ref 22–29)
CO2 SERPL-SCNC: 34 MMOL/L (ref 22–29)
CREAT SERPL-MCNC: 2.81 MG/DL (ref 0.76–1.27)
GFR SERPL CREATININE-BSD FRML MDRD: 22 ML/MIN/1.73
GLUCOSE BLDC GLUCOMTR-MCNC: 170 MG/DL (ref 70–105)
GLUCOSE BLDC GLUCOMTR-MCNC: 178 MG/DL (ref 70–105)
GLUCOSE BLDC GLUCOMTR-MCNC: 193 MG/DL (ref 70–105)
GLUCOSE BLDC GLUCOMTR-MCNC: 218 MG/DL (ref 70–105)
GLUCOSE SERPL-MCNC: 183 MG/DL (ref 65–99)
HCO3 BLDA-SCNC: 31.5 MMOL/L (ref 21–28)
HEMODILUTION: NO
INHALED O2 CONCENTRATION: <21 %
MODALITY: ABNORMAL
PCO2 BLDA: 55.7 MM HG (ref 35–48)
PH BLDA: 7.36 PH UNITS (ref 7.35–7.45)
PHOSPHATE SERPL-MCNC: 5.7 MG/DL (ref 2.5–4.5)
PO2 BLDA: 42.8 MM HG (ref 83–108)
POTASSIUM SERPL-SCNC: 4.8 MMOL/L (ref 3.5–5.2)
POTASSIUM SERPL-SCNC: 5.1 MMOL/L (ref 3.5–5.2)
POTASSIUM SERPL-SCNC: 5.4 MMOL/L (ref 3.5–5.2)
POTASSIUM SERPL-SCNC: 5.4 MMOL/L (ref 3.5–5.2)
SAO2 % BLDCOA: 75.2 % (ref 94–98)
SODIUM SERPL-SCNC: 136 MMOL/L (ref 136–145)

## 2021-08-11 PROCEDURE — 94799 UNLISTED PULMONARY SVC/PX: CPT

## 2021-08-11 PROCEDURE — 63710000001 INSULIN LISPRO (HUMAN) PER 5 UNITS: Performed by: INTERNAL MEDICINE

## 2021-08-11 PROCEDURE — 99232 SBSQ HOSP IP/OBS MODERATE 35: CPT | Performed by: INTERNAL MEDICINE

## 2021-08-11 PROCEDURE — 25010000002 CALCIUM GLUCONATE-NACL 1-0.675 GM/50ML-% SOLUTION: Performed by: INTERNAL MEDICINE

## 2021-08-11 PROCEDURE — 94660 CPAP INITIATION&MGMT: CPT

## 2021-08-11 PROCEDURE — 25010000002 FUROSEMIDE PER 20 MG: Performed by: INTERNAL MEDICINE

## 2021-08-11 PROCEDURE — 84132 ASSAY OF SERUM POTASSIUM: CPT | Performed by: INTERNAL MEDICINE

## 2021-08-11 PROCEDURE — 36600 WITHDRAWAL OF ARTERIAL BLOOD: CPT

## 2021-08-11 PROCEDURE — 97530 THERAPEUTIC ACTIVITIES: CPT

## 2021-08-11 PROCEDURE — 97535 SELF CARE MNGMENT TRAINING: CPT

## 2021-08-11 PROCEDURE — 80069 RENAL FUNCTION PANEL: CPT | Performed by: INTERNAL MEDICINE

## 2021-08-11 PROCEDURE — 63710000001 INSULIN REGULAR HUMAN PER 5 UNITS: Performed by: INTERNAL MEDICINE

## 2021-08-11 PROCEDURE — 82803 BLOOD GASES ANY COMBINATION: CPT

## 2021-08-11 PROCEDURE — 82962 GLUCOSE BLOOD TEST: CPT

## 2021-08-11 PROCEDURE — 71045 X-RAY EXAM CHEST 1 VIEW: CPT

## 2021-08-11 RX ORDER — FUROSEMIDE 10 MG/ML
80 INJECTION INTRAMUSCULAR; INTRAVENOUS EVERY 12 HOURS
Status: DISCONTINUED | OUTPATIENT
Start: 2021-08-11 | End: 2021-08-11

## 2021-08-11 RX ORDER — CALCIUM GLUCONATE 20 MG/ML
1 INJECTION, SOLUTION INTRAVENOUS ONCE
Status: COMPLETED | OUTPATIENT
Start: 2021-08-11 | End: 2021-08-11

## 2021-08-11 RX ORDER — DEXTROSE MONOHYDRATE 25 G/50ML
50 INJECTION, SOLUTION INTRAVENOUS ONCE
Status: COMPLETED | OUTPATIENT
Start: 2021-08-11 | End: 2021-08-11

## 2021-08-11 RX ORDER — FUROSEMIDE 10 MG/ML
80 INJECTION INTRAMUSCULAR; INTRAVENOUS 2 TIMES DAILY
Status: DISCONTINUED | OUTPATIENT
Start: 2021-08-11 | End: 2021-08-14

## 2021-08-11 RX ORDER — SODIUM POLYSTYRENE SULFONATE 15 G/60ML
15 SUSPENSION ORAL; RECTAL ONCE
Status: COMPLETED | OUTPATIENT
Start: 2021-08-11 | End: 2021-08-11

## 2021-08-11 RX ADMIN — IPRATROPIUM BROMIDE AND ALBUTEROL SULFATE 3 ML: 2.5; .5 SOLUTION RESPIRATORY (INHALATION) at 15:12

## 2021-08-11 RX ADMIN — INSULIN LISPRO 2 UNITS: 100 INJECTION, SOLUTION INTRAVENOUS; SUBCUTANEOUS at 09:51

## 2021-08-11 RX ADMIN — FUROSEMIDE 80 MG: 10 INJECTION, SOLUTION INTRAMUSCULAR; INTRAVENOUS at 20:24

## 2021-08-11 RX ADMIN — CARVEDILOL 6.25 MG: 6.25 TABLET, FILM COATED ORAL at 09:50

## 2021-08-11 RX ADMIN — DEXTROSE MONOHYDRATE 50 ML: 25 INJECTION, SOLUTION INTRAVENOUS at 02:33

## 2021-08-11 RX ADMIN — ATORVASTATIN CALCIUM 40 MG: 40 TABLET, FILM COATED ORAL at 20:23

## 2021-08-11 RX ADMIN — INSULIN LISPRO 2 UNITS: 100 INJECTION, SOLUTION INTRAVENOUS; SUBCUTANEOUS at 20:24

## 2021-08-11 RX ADMIN — DONEPEZIL HYDROCHLORIDE 10 MG: 5 TABLET, FILM COATED ORAL at 20:23

## 2021-08-11 RX ADMIN — AMIODARONE HYDROCHLORIDE 200 MG: 200 TABLET ORAL at 09:49

## 2021-08-11 RX ADMIN — CARVEDILOL 6.25 MG: 6.25 TABLET, FILM COATED ORAL at 17:56

## 2021-08-11 RX ADMIN — CALCIUM GLUCONATE 1 G: 20 INJECTION, SOLUTION INTRAVENOUS at 02:46

## 2021-08-11 RX ADMIN — ISOSORBIDE MONONITRATE 60 MG: 60 TABLET, EXTENDED RELEASE ORAL at 17:56

## 2021-08-11 RX ADMIN — GABAPENTIN 200 MG: 100 CAPSULE ORAL at 09:50

## 2021-08-11 RX ADMIN — DULOXETINE 60 MG: 30 CAPSULE, DELAYED RELEASE ORAL at 09:51

## 2021-08-11 RX ADMIN — Medication 10 ML: at 09:51

## 2021-08-11 RX ADMIN — APIXABAN 5 MG: 5 TABLET, FILM COATED ORAL at 20:24

## 2021-08-11 RX ADMIN — APIXABAN 5 MG: 5 TABLET, FILM COATED ORAL at 09:49

## 2021-08-11 RX ADMIN — Medication 1000 UNITS: at 09:50

## 2021-08-11 RX ADMIN — ISOSORBIDE MONONITRATE 60 MG: 60 TABLET, EXTENDED RELEASE ORAL at 09:51

## 2021-08-11 RX ADMIN — INSULIN LISPRO 2 UNITS: 100 INJECTION, SOLUTION INTRAVENOUS; SUBCUTANEOUS at 17:56

## 2021-08-11 RX ADMIN — SODIUM POLYSTYRENE SULFONATE 15 G: 15 SUSPENSION ORAL; RECTAL at 02:30

## 2021-08-11 RX ADMIN — Medication 10 ML: at 20:23

## 2021-08-11 RX ADMIN — DILTIAZEM HYDROCHLORIDE 240 MG: 240 CAPSULE, COATED, EXTENDED RELEASE ORAL at 09:50

## 2021-08-11 RX ADMIN — AMIODARONE HYDROCHLORIDE 200 MG: 200 TABLET ORAL at 20:24

## 2021-08-11 RX ADMIN — CYANOCOBALAMIN TAB 1000 MCG 1000 MCG: 1000 TAB at 09:51

## 2021-08-11 RX ADMIN — IPRATROPIUM BROMIDE AND ALBUTEROL SULFATE 3 ML: 2.5; .5 SOLUTION RESPIRATORY (INHALATION) at 11:02

## 2021-08-11 RX ADMIN — ASPIRIN 81 MG: 81 TABLET, CHEWABLE ORAL at 09:50

## 2021-08-11 RX ADMIN — INSULIN HUMAN 10 UNITS: 100 INJECTION, SOLUTION PARENTERAL at 02:38

## 2021-08-11 RX ADMIN — FUROSEMIDE 80 MG: 10 INJECTION, SOLUTION INTRAMUSCULAR; INTRAVENOUS at 09:51

## 2021-08-11 RX ADMIN — SODIUM BICARBONATE 50 MEQ: 84 INJECTION, SOLUTION INTRAVENOUS at 02:34

## 2021-08-11 RX ADMIN — IPRATROPIUM BROMIDE AND ALBUTEROL SULFATE 3 ML: 2.5; .5 SOLUTION RESPIRATORY (INHALATION) at 06:46

## 2021-08-11 RX ADMIN — INSULIN LISPRO 4 UNITS: 100 INJECTION, SOLUTION INTRAVENOUS; SUBCUTANEOUS at 12:05

## 2021-08-11 NOTE — THERAPY TREATMENT NOTE
Subjective: Pt agreeable to therapeutic plan of care.  Cognition: arousal/alertness: Attentive and Listless, safety/judgement: poor and awareness of deficits: fair awareness of safety precautions and poor awareness of defits    Objective:     Bed Mobility: Mod-A sup>sit  Functional Transfers: Mod-A, with rolling walker and utilizing compensatory strategy. Unable to sense or correct posterior lean.  Functional Ambulation: N/A or Not attempted.    Lower Body Dressing: Dependent  ADL Position: edge of bed sitting  ADL Comments: unable to reach feet    Grooming: Min-A, Mod-A and Max-A  ADL Position: edge of bed sitting and supported sitting  ADL Comments: [place] hair w/ SBA & setup plus cue after transfer. Max (A) to wash hair EOB, min (A) to complete oral care EOB, & Mod (A) to wash face EOB.    Pain: 3 VAS chronic back pain  Education: Provided education on importance of mobility and skilled verbal / tactile cueing throughout intervention.     Assessment: Benson Mclean presents with ADL impairments below baseline abilities which indicate the need for continued skilled intervention while inpatient. Tolerating session today without incident. Will continue to follow and progress as tolerated. Pt has been in functional decline for the past 8 months at least. Review of pt chart indicates no functional gain documented in > 8 mos. Has been in rehab/ECF. Expect pt to return to his ECF at d/c. Pt needs mod (A) & RW to transfer & is not able to walk. Functionally max (A) for ADL but able to do a little more if encouraged and assisted with skilled scaffolding to the tasks.      Plan/Recommendations:   Pt would benefit from LTC placement at discharge from facility.   Pt desires LTC placement at discharge. Pt cooperative; agreeable to therapeutic recommendations and plan of care.     Modified Elliott: 5 = Severe disability (Requires constant nursing care and attention, bedridden, incontinent)    Post-Tx Position: Up in Chair,  Alarms activated and Call light and personal items within reach  PPE: gloves, surgical mask, eyewear protection

## 2021-08-11 NOTE — NURSING NOTE
MARSHALL Lieberman called and instructed this RN to change pt to cpap with a mask, due to pt's mouth breathing.  RT notified.

## 2021-08-11 NOTE — CONSULTS
Group: [facility]         CONSULT NOTE    Patient Identification:  Benson Mclean  70 y.o.  male  1950  [id number]            Requesting physician: Attending physician    Reason for Consultation: Dyspnea        History of Present Illness:    70 y.o. male w/PMH of CHF, CKD, DM, HTN, HLD, CAD, COPD, A. fib, CABG, ANNETTE, CVA, neuropathy, obesity, depression, COVID-19 who presents to [facility] ED on 8/3/21 with palpitations, patient was discharged from this facility yesterday after a 12-day admission.  Patient states tachycardia progressively worsened after discharge from this facility yesterday, no aggravating or alleviating factors noted.  Patient admits to chest pain, nausea, weakness, palpitations, edema, dyspnea.  Patient denies fever, chills, nausea, vomiting.  As tachycardia did not improve he decided to go to [facility] ED.  Pulmonology was consulted due to patient requiring more oxygen    Assessment:    Dyspnea    Pulmonary edema with bilateral pleural effusions and increased vascular markings  ABG 8/10/2021, 7.3 4/66/108/35 on BiPAP with high flow oxygen  Acute hypercapnia most likely medication induced  Acute kidney injury with hyperkalemia    Anemia  Features of obstructive sleep apnea  A. Fib  Congestive heart failure  ECHO 7/21/2021 7/21 EF 30 % RVSP 31      Recommendations:    Patient will benefit from noninvasive ventilation due to underlying obstructive sleep apnea and pulmonary edema  Hypercapnia most likely medication induced avoid any sedatives or narcotics  Diuresis currently on Lasix 40 mg twice daily          Review of Sytems:  Review of Systems   Respiratory: Positive for cough and shortness of breath.    Cardiovascular: Positive for palpitations and leg swelling.       Past Medical History:  Past Medical History:   Diagnosis Date   • Appetite loss    • Brain bleed (CMS/HCC)    • Carpal tunnel syndrome on left     carpal tunnel on left hand   • CHF  (congestive heart failure) (CMS/[facility])    • Diabetic neuropathy (CMS/[facility])    • DM2 (diabetes mellitus, type 2) (CMS/[facility])    • History of angina    • Hyperlipidemia    • Hypogonadism in male    • Obesity    • Sleep apnea    • Stroke (CMS/[facility])    • Unsteady gait        Past Surgical History:  Past Surgical History:   Procedure Laterality Date   • ANGIOPLASTY      X2   • APPENDECTOMY     • CARDIAC CATHETERIZATION  11/13/2015   • CARDIAC CATHETERIZATION N/A 5/24/2021    Procedure: Left Heart Cath and coronary angiogram;  Surgeon: Jose G Rm MD;  Location:  [facility];  Service: Cardiovascular;  Laterality: N/A;   • CARDIAC CATHETERIZATION  5/24/2021    Procedure: Saphenous Vein Graft;  Surgeon: Jose G Rm MD;  Location:  [facility];  Service: Cardiovascular;;   • CARDIAC CATHETERIZATION N/A 5/24/2021    Procedure: Percutaneous Coronary Intervention;  Surgeon: Bernabe Zambrano MD;  Location:  [facility];  Service: Cardiology;  Laterality: N/A;   • CARDIAC CATHETERIZATION N/A 5/24/2021    Procedure: Stent NIELS coronary;  Surgeon: Bernabe Zambrano MD;  Location:  [facility];  Service: Cardiology;  Laterality: N/A;   • CARDIAC CATHETERIZATION N/A 5/26/2021    Procedure: Percutaneous Coronary Intervention;  Surgeon: Bernabe Zambrano MD;  Location:  [facility];  Service: Cardiovascular;  Laterality: N/A;   • CARDIAC CATHETERIZATION N/A 5/26/2021    Procedure: Stent NIELS bypass graft;  Surgeon: Bernabe Zambrano MD;  Location:  [facility];  Service: Cardiovascular;  Laterality: N/A;   • CARDIAC ELECTROPHYSIOLOGY PROCEDURE N/A 5/24/2021    Procedure: Temporary Pacemaker;  Surgeon: Bernabe Zambrano MD;  Location:  [facility];  Service: Cardiology;  Laterality: N/A;   • CARDIAC ELECTROPHYSIOLOGY PROCEDURE N/A 5/26/2021    Procedure: Temporary Pacemaker;  Surgeon: Bernabe Zambrano MD;  Location:  [facility]  INV[facility]IVE [facility];  Service: Cardiovascular;  Laterality: N/A;   • CARPAL TUNNEL RELEASE Left 04/29/2018    carpal tunnel- lt hand// other hand surgeries    • CATARACT EXTRACTION, BILATERAL  2002    Dr. Lux Acosta   • CHOLECYSTECTOMY     • COLON RESECTION  2005   • CORONARY ANGIOPLASTY     • CORONARY ANGIOPLASTY WITH STENT PLACEMENT  11/13/2015    PTCA stent to proximal in stent and mid to distal lad   • CORONARY ANGIOPLASTY WITH STENT PLACEMENT  09/16/2016    PTCA stent to mid lad and stent to vein graft to marginal   • CORONARY ARTERY BYPASS GRAFT  2005    @ [facility]   • CYST REMOVAL      cyst removed from scrotum   • FOOT SURGERY Right 07/17/2018   • FOOT SURGERY Left 02/04/2019   • TOE AMPUTATION Right     right toe removed D/T infected cut that went to the bone        Home Meds:  Medications Prior to Admission   Medication Sig Dispense Refill Last Dose   • albuterol sulfate  (90 Base) MCG/ACT inhaler Inhale 2 puffs Every 4 (Four) Hours As Needed.      • apixaban (ELIQUIS) 5 MG tablet tablet Take 5 mg by mouth Daily.      • aspirin 81 MG chewable tablet Chew 81 mg Daily.      • atorvastatin (LIPITOR) 40 MG tablet Take 40 mg by mouth Every Night.      • cholecalciferol (VITAMIN D3) 25 MCG (1000 UT) tablet Take 1,000 Units by mouth Daily.      • dextrose (GLUTOSE) 40 % gel Take  by mouth Every 1 (One) Hour As Needed for Low Blood Sugar.      • donepezil (ARICEPT) 10 MG tablet Take 10 mg by mouth Every Night.      • DULoxetine HCl (DRIZALMA) 60 MG capsule Take 60 mg by mouth Daily.      • gabapentin (NEURONTIN) 100 MG capsule Take 2 capsules by mouth 2 (two) times a day. 6 capsule 0    • hydrALAZINE (APRESOLINE) 50 MG tablet Take 50 mg by mouth 2 (two) times a day. Hold for SBP <110      • isosorbide mononitrate (IMDUR) 60 MG 24 hr tablet Take 60 mg by mouth 2 (Two) Times a Day.      • metoprolol succinate XL (TOPROL-XL) 100 MG 24 hr tablet Take 1 tablet by mouth Daily for 30 days. 30 tablet 0    • nitroglycerin  "(NITROSTAT) 0.4 MG SL tablet Place 1 tablet under the tongue Every 5 (Five) Minutes As Needed for Chest Pain (Only if SBP Greater Than 100). Take no more than 3 doses in 15 minutes. 30 tablet 12    • spironolactone (ALDACTONE) 25 MG tablet Take 1 tablet by mouth Daily for 30 days. 30 tablet 0    • tiotropium (Spiriva HandiHaler) 18 MCG per inhalation capsule Place 1 capsule into inhaler and inhale Daily. 30 capsule 1    • vitamin B-12 (CYANOCOBALAMIN) 1000 MCG tablet Take 1,000 mcg by mouth Daily.      • [DISCONTINUED] furosemide (LASIX) 40 MG tablet Take 1 tablet by mouth 2 (Two) Times a Day for 30 days. 60 tablet 0        Allergies:  Allergies   Allergen Reactions   • Codeine Itching       Social History:   Social History     Socioeconomic History   • Marital status:      Spouse name: Not on file   • Number of children: Not on file   • Years of education: Not on file   • Highest education level: Not on file   Tobacco Use   • Smoking status: Former Smoker     Packs/day: 1.00     Years: 60.00     Pack years: 60.00     Types: Cigarettes   • Smokeless tobacco: Never Used   • Tobacco comment: Patient quit smoking 11/2020   Vaping Use   • Vaping Use: Never used   Substance and Sexual Activity   • Alcohol use: No   • Drug use: Yes     Frequency: 1.0 times per week     Types: Marijuana     Comment: socially   • Sexual activity: Defer       Family History:  Family History   Problem Relation Age of Onset   • Heart disease Mother    • Heart disease Father    • Diabetes Sister    • Heart disease Sister    • Diabetes Brother    • Mental illness Brother        Physical Exam:  /56 (BP Location: Left arm, Patient Position: Lying)   Pulse 104   Temp 98.7 °F (37.1 °C) (Axillary)   Resp 14   Ht 170.2 cm (67\")   Wt 115 kg (253 lb 15.5 oz)   SpO2 97%   BMI 39.78 kg/m²  Body mass index is 39.78 kg/m². 97% 115 kg (253 lb 15.5 oz)  Physical Exam  Cardiovascular:      Heart sounds: Murmur heard.     Pulmonary:      " Breath sounds: Rhonchi and rales present.   Musculoskeletal:      Right lower leg: Edema present.      Left lower leg: Edema present.         LABS:  Lab Results   Component Value Date    CALCIUM 8.9 08/10/2021    PHOS 5.1 (H) 08/05/2021     Results from last 7 days   Lab Units 08/10/21  2304 08/10/21  1921 08/10/21  1614 08/10/21  1249 08/10/21  0221 08/07/21  0314 08/07/21  0314 08/06/21  0625 08/06/21  0625 08/05/21  0300 08/05/21  0300 08/04/21  0519 08/04/21 0519   MAGNESIUM mg/dL  --   --   --   --   --   --   --   --   --   --  2.1  --   --    SODIUM mmol/L  --   --   --   --  132*  --  138  --  139   < > 141   < > 140   POTASSIUM mmol/L 5.9* 5.5* 5.3*   < > 5.7*   < > 4.6   < > 4.4   < > 4.6   < > 4.6   CHLORIDE mmol/L  --   --   --   --  89*  --  95*  --  96*   < > 98   < > 97*   CO2 mmol/L  --   --   --   --  33.0*  --  33.0*  --  34.0*   < > 31.0*   < > 33.0*   BUN mg/dL  --   --   --   --  71*  --  67*  --  66*   < > 68*   < > 65*   CREATININE mg/dL  --   --   --   --  2.67*  --  2.35*  --  2.19*   < > 2.04*   < > 1.95*   GLUCOSE mg/dL  --   --   --   --  101*   < > 139*   < > 66   < > 71   < > 171*   CALCIUM mg/dL  --   --   --   --  8.9  --  8.8  --  8.9   < > 9.0   < > 9.0   WBC 10*3/mm3  --   --   --   --   --   --   --   --  5.70  --  6.40  --  7.40   HEMOGLOBIN g/dL  --   --   --   --   --   --   --   --  8.1*  --  7.0*  --  7.1*   PLATELETS 10*3/mm3  --   --   --   --   --   --   --   --  307  --  310  --  312    < > = values in this interval not displayed.     Lab Results   Component Value Date    TROPONINT 0.792 (C) 08/04/2021     Results from last 7 days   Lab Units 08/04/21  0519   TROPONIN T ng/mL 0.792*             Results from last 7 days   Lab Units 08/10/21  2240 08/10/21  2122   PH, ARTERIAL pH units 7.342* 7.295*   PCO2, ARTERIAL mm Hg 66.1* 74.1*   PO2 ART mm Hg 108.5* 93.3   O2 SATURATION ART % 97.6 95.8   MODALITY  Adult Vent CPAP                 Lab Results   Component Value Date     TSH 3.300 07/20/2021     Estimated Creatinine Clearance: 31.2 mL/min (A) (by C-G formula based on SCr of 2.67 mg/dL (H)).         Imaging:  Imaging Results (Last 24 Hours)     ** No results found for the last 24 hours. **            Current Meds:   SCHEDULE  amiodarone, 200 mg, Oral, Q12H  apixaban, 5 mg, Oral, Q12H  aspirin, 81 mg, Oral, Daily  atorvastatin, 40 mg, Oral, Nightly  carvedilol, 6.25 mg, Oral, BID With Meals  cholecalciferol, 1,000 Units, Oral, Daily  dilTIAZem CD, 240 mg, Oral, Q24H  donepezil, 10 mg, Oral, Nightly  DULoxetine, 60 mg, Oral, Daily  [START ON 8/11/2021] furosemide, 40 mg, Oral, BID  gabapentin, 200 mg, Oral, Q12H  hydrALAZINE, 50 mg, Oral, Q12H  insulin lispro, 0-9 Units, Subcutaneous, TID AC  ipratropium-albuterol, 3 mL, Nebulization, 4x Daily - RT  isosorbide mononitrate, 60 mg, Oral, BID - Nitrates  sodium chloride, 10 mL, Intravenous, Q12H  vitamin B-12, 1,000 mcg, Oral, Daily      Infusions     PRNs  •  acetaminophen **OR** acetaminophen **OR** acetaminophen  •  aluminum-magnesium hydroxide-simethicone  •  dextrose  •  dextrose  •  glucagon (human recombinant)  •  HYDROcodone-acetaminophen  •  insulin lispro **AND** insulin lispro  •  melatonin  •  nitroglycerin  •  ondansetron **OR** ondansetron  •  [COMPLETED] Insert peripheral IV **AND** sodium chloride  •  sodium chloride        Angel Prajapati MD  8/10/2021  23:45 EDT      Much of this encounter note is an electronic transcription/translation of spoken language to printed text using Dragon Software.

## 2021-08-11 NOTE — CASE MANAGEMENT/SOCIAL WORK
Continued Stay Note  DOREEN Amin     Patient Name: Benson Mclean  MRN: [id number]  Today's Date: 8/11/2021    Admit Date: 8/3/2021    Discharge Plan     Row Name 08/11/21 1253       Plan    Plan  D/C Plan: Return to [facility]. Precert approved 8/10 (expires in 48 hours). No PASRR required for return.    Plan Comments  Barrier to D/C: 5L HF O2, monitoring renal function and heart rate.          Expected Discharge Date and Time     Expected Discharge Date Expected Discharge Time    Aug 12, 2021             Christine Dickerson

## 2021-08-11 NOTE — PROGRESS NOTES
[facility] Medicine Services Daily Progress Note    Patient Name: Benson Mclean  : 1950  MRN: [id number]  Primary Care Physician:  Samantha Zabala APRN  Date of admission: 8/3/2021      Subjective      Chief Complaint: Palpitations      Patient Reports Benson Mclean is a 70 y.o. male w/PMH of CHF, CKD, DM, HTN, HLD, CAD, COPD, A. fib, CABG, ANNETTE, CVA, neuropathy, obesity, depression, COVID-19 who presents to [facility] ED with palpitations, patient was discharged from this facility yesterday after a 12-day admission.  Patient states tachycardia progressively worsened after discharge from this facility yesterday, no aggravating or alleviating factors noted.  Patient admits to chest pain, nausea, weakness, palpitations, edema, dyspnea.  Patient denies fever, chills, nausea, vomiting.  As tachycardia did not improve he decided to go to [facility] ED.        Upon arrival to the ED vitals temp 97, HR 93, RR 22, /75, O2 sat 97% on 4 L nasal cannula.  Labs notable for troponin 0.117, proBNP 7648, glucose 198, , K4.5, CO2 32, creatinine 1.91, BUN 68, GFR 35, WBC 7.4, Hgb 8.0, platelets 334, neutrophils 65.9.  EKG shows tachycardia rate 126 repolarization abnormality suggest ischemia diffuse leads.  Chest x-ray shows stable cardiomegaly with pulmonary interstitial edema pattern trace bilateral pleural effusions.  Patient treated in the ED with 10 mg IV Cardizem, IV Cardizem drip initiated.    Readmitted from [facility] after having been discharged 24 hours ago. 21, 3:42 PM EDT     Patient much improved.  Heart rate controlled.  No significant complaints 21, 8:38 PM EDT    After I made the discharge yesterday I was told patient needs a pre-CERT.  Hence discharge canceled 21, 5:06 PM EDT    No new complaints. 21, 2:39 PM EDT    21:HR has been stable and he has no new complaints. No chest pain.     8/10/21:feeling  stable this am however nurse reports episode of bradycardia last night where patient felt dizzy. No chest pain.     8/11/2021: Apparently last night patient had some worsening shortness of breath oxygen had to be turned up.  Had to wear the CPAP.  Reports breathing is stable today    Review of Systems   Cardiovascular: Negative for chest pain and palpitations.            Objective      Vitals:   Temp:  [98.5 °F (36.9 °C)-98.7 °F (37.1 °C)] 98.5 °F (36.9 °C)  Heart Rate:  [] 99  Resp:  [10-20] 17  BP: (104-127)/(56-71) 119/64  Flow (L/min):  [3-15] 5    Physical Exam  Vitals reviewed.   HENT:      Head: Normocephalic.   Eyes:      Pupils: Pupils are equal, round, and reactive to light.   Cardiovascular:      Rate and Rhythm: Rhythm irregular.   Pulmonary:      Effort: No respiratory distress.   Abdominal:      General: There is no distension.      Palpations: Abdomen is soft.   Musculoskeletal:      Right lower leg: Edema present.      Left lower leg: Edema present.   Neurological:      Mental Status: He is alert. Mental status is at baseline.   Psychiatric:         Mood and Affect: Mood normal.        Noted      Result Review    Result Review:  I have personally reviewed the results from the time of this admission to 8/11/2021 14:12 EDT and agree with these findings:  [x]  Laboratory  []  Microbiology  [x]  Radiology  []  EKG/Telemetry   []  Cardiology/Vascular   []  Pathology  [x]  Old records  []  Other:             Assessment/Plan      Brief Patient Summary:  Benson Mclean is a 70 y.o. male who       amiodarone, 200 mg, Oral, Q12H  apixaban, 5 mg, Oral, Q12H  aspirin, 81 mg, Oral, Daily  atorvastatin, 40 mg, Oral, Nightly  carvedilol, 6.25 mg, Oral, BID With Meals  cholecalciferol, 1,000 Units, Oral, Daily  dilTIAZem CD, 240 mg, Oral, Q24H  donepezil, 10 mg, Oral, Nightly  DULoxetine, 60 mg, Oral, Daily  furosemide, 80 mg, Intravenous, BID  gabapentin, 200 mg, Oral, Q12H  hydrALAZINE, 50 mg, Oral,  Q12H  insulin lispro, 0-9 Units, Subcutaneous, TID AC  ipratropium-albuterol, 3 mL, Nebulization, 4x Daily - RT  isosorbide mononitrate, 60 mg, Oral, BID - Nitrates  sodium chloride, 10 mL, Intravenous, Q12H  vitamin B-12, 1,000 mcg, Oral, Daily             Active Hospital Problems:  Active Hospital Problems    Diagnosis    • **Atrial fibrillation with RVR (CMS/[facility])    • Anemia of renal disease    • Acute on chronic systolic heart failure (CMS/[facility])    • Tobacco use disorder    • CKD (chronic kidney disease) stage 3, GFR 30-59 ml/min (CMS/[facility])    • Mixed hyperlipidemia    • Chronic obstructive pulmonary disease, unspecified (CMS/[facility])    • Flaccid hemiplegia of right dominant side as late effect of cerebral infarction (CMS/[facility])    • Unspecified dementia with behavioral disturbance (CMS/[facility])    • Elevated troponin    • Essential hypertension    • S/P CABG (coronary artery bypass graft)    • Chronic venous hypertension (idiopathic) with ulcer and inflammation of bilateral lower extremity (CMS/[facility])    • Chronic foot ulcer (CMS/[facility])    • Coronary artery disease    • ANNETTE treated with BiPAP    • Diabetic neuropathy (CMS/[facility])      Plan:     Atrial fibrillation:   -on cardizem, amiodarone and Eliquis  -bradycardia noted last night, cardiology to continue current meds   -cardiology following    DILEEP on CKD IV  -Cr higher again today 2.81  -hold diuresis today  -nephrology following and managing diuresis  -BMP in am    Acute on chronic hypercapnic/hypoxic respiratory failure  ANNETTE with BiPAP:  -now using BIPAP hs and prn while here  -pulmonary following    Hyperkalemia  -K was improving at 5.4 this a.m.  -treated per protocol   -aldactone stopped and may need to d/c of K remains high normal     Chronic HFrEF  -compensated at this time  -diuretics as noted above  -cannot use ACE/ARB d/t Cr above    Essential hypertension:  -Continue home medication hydralazine and imdur     Dyslipidemia:  -Continue home medication atorvastatin 40  mg p.o. nightly     Obesity:  -encourage dietary modifications     Coronary artery disease/history of stent/history of CABG  -Continue ASA  And imdur     COPD:  -no exacerbation        History of CVA/right hemiaplasia:  -CVA in November 2020     Diabetic neuropathy:  Depression:  Unspecified dementia:  -chronic conditions continue home meds as indicated     DVT prophylaxis:  Medical DVT prophylaxis orders are present.    CODE STATUS:    Code Status: CPR  Medical Interventions (Level of Support Prior to Arrest): Full      Disposition:  To rehab when cleared by specialists    This patient has been examined wearing appropriate Personal Protective [id number]      Electronically signed by Jose Guadalupe Corea DO, 08/11/21, 14:12 EDT.  [facility] Hospitalist Team

## 2021-08-11 NOTE — PROGRESS NOTES
Daily Progress Note        Atrial fibrillation with RVR (CMS/[facility])    S/P CABG (coronary artery bypass graft)    Essential hypertension    Elevated troponin    ANNETTE treated with BiPAP    Mixed hyperlipidemia    Flaccid hemiplegia of right dominant side as late effect of cerebral infarction (CMS/[facility])    Diabetic neuropathy (CMS/[facility])    Unspecified dementia with behavioral disturbance (CMS/[facility])    Coronary artery disease    Chronic venous hypertension (idiopathic) with ulcer and inflammation of bilateral lower extremity (CMS/[facility])    Chronic obstructive pulmonary disease, unspecified (CMS/[facility])    Chronic foot ulcer (CMS/[facility])    CKD (chronic kidney disease) stage 3, GFR 30-59 ml/min (CMS/[facility])    Tobacco use disorder    Acute on chronic systolic heart failure (CMS/[facility])    Anemia of renal disease    Assessment:    Dyspnea    Pulmonary edema with bilateral pleural effusions and increased vascular markings  ABG 8/10/2021, 7.3 4/66/108/35 on BiPAP with high flow oxygen  Acute hypercapnia most likely medication induced  Acute kidney injury with hyperkalemia    Anemia  Features of obstructive sleep apnea  A. Fib  Congestive heart failure    ECHO 7/21/2021 7/21 EF 30 % RVSP 31      Recommendations:    Patient will benefit from noninvasive ventilation due to underlying obstructive sleep apnea and pulmonary edema    Avoid any sedatives or narcotics    Diuresis currently on Lasix 40 mg twice daily  Anticoagulation-Eliquis  Bronchodilator   Glycemic control               LOS: 7 days     Subjective         Objective     Vital signs for last 24 hours:  Vitals:    08/11/21 0334 08/11/21 0559 08/11/21 0650 08/11/21 0735   BP:  112/69     BP Location:  Left arm     Patient Position:  Lying     Pulse: 79 85     Resp: 18 10     Temp:  98.7 °F (37.1 °C)     TempSrc:  Axillary     SpO2: 99% 99% 93% 90%   Weight:  115 kg (253 lb 8.5 oz)     Height:           Intake/Output last 3 shifts:  I/O last 3 completed shifts:  In: 400 [P.O.:400]  Out:  300 [Urine:300]  Intake/Output this shift:  No intake/output data recorded.      Radiology  Imaging Results (Last 24 Hours)     Procedure Component Value Units Date/Time    XR Chest 1 View [[id number]] Resulted: 08/11/21 0807     Updated: 08/11/21 0807          Labs:  Results from last 7 days   Lab Units 08/06/21  0625   WBC 10*3/mm3 5.70   HEMOGLOBIN g/dL 8.1*   HEMATOCRIT % 24.0*   PLATELETS 10*3/mm3 307     Results from last 7 days   Lab Units 08/10/21  2304 08/10/21  1249 08/10/21  0221   SODIUM mmol/L  --   --  132*   POTASSIUM mmol/L 5.9*   < > 5.7*   CHLORIDE mmol/L  --   --  89*   CO2 mmol/L  --   --  33.0*   BUN mg/dL  --   --  71*   CREATININE mg/dL  --   --  2.67*   CALCIUM mg/dL  --   --  8.9   GLUCOSE mg/dL  --   --  101*    < > = values in this interval not displayed.     Results from last 7 days   Lab Units 08/10/21  2240   PH, ARTERIAL pH units 7.342*   PO2 ART mm Hg 108.5*   PCO2, ARTERIAL mm Hg 66.1*   HCO3 ART mmol/L 35.8*                 Results from last 7 days   Lab Units 08/05/21  0300   MAGNESIUM mg/dL 2.1                   Meds:   SCHEDULE  amiodarone, 200 mg, Oral, Q12H  apixaban, 5 mg, Oral, Q12H  aspirin, 81 mg, Oral, Daily  atorvastatin, 40 mg, Oral, Nightly  carvedilol, 6.25 mg, Oral, BID With Meals  cholecalciferol, 1,000 Units, Oral, Daily  dilTIAZem CD, 240 mg, Oral, Q24H  donepezil, 10 mg, Oral, Nightly  DULoxetine, 60 mg, Oral, Daily  furosemide, 40 mg, Oral, BID  gabapentin, 200 mg, Oral, Q12H  hydrALAZINE, 50 mg, Oral, Q12H  insulin lispro, 0-9 Units, Subcutaneous, TID AC  ipratropium-albuterol, 3 mL, Nebulization, 4x Daily - RT  isosorbide mononitrate, 60 mg, Oral, BID - Nitrates  sodium chloride, 10 mL, Intravenous, Q12H  vitamin B-12, 1,000 mcg, Oral, Daily      Infusions     PRNs  •  acetaminophen **OR** acetaminophen **OR** acetaminophen  •  aluminum-magnesium hydroxide-simethicone  •  dextrose  •  dextrose  •  glucagon (human recombinant)  •  HYDROcodone-acetaminophen  •   insulin lispro **AND** insulin lispro  •  melatonin  •  nitroglycerin  •  ondansetron **OR** ondansetron  •  [COMPLETED] Insert peripheral IV **AND** sodium chloride  •  sodium chloride    Physical Exam:  Physical Exam  Vitals reviewed.   Pulmonary:      Breath sounds: Decreased breath sounds and rhonchi present.   Musculoskeletal:      Right lower leg: Edema present.      Left lower leg: Edema present.   Neurological:      Mental Status: He is alert and oriented to person, place, and time.         ROS  Review of Systems   Constitutional: Positive for activity change.   Respiratory: Positive for shortness of breath.    Cardiovascular: Positive for leg swelling.   Neurological: Positive for weakness.       I have reviewed the patients new clinical results.    Electronically signed by FABIOLA Bui

## 2021-08-11 NOTE — NURSING NOTE
MARSHALL Grover contacted about pt's CPAP settings.  NP updated with current ABG results, she stated to get a repeat in an hour.

## 2021-08-11 NOTE — PLAN OF CARE
Problem: Arrhythmia/Dysrhythmia  Goal: Normalized Cardiac Rhythm  Outcome: Ongoing, Progressing     Problem: Fall Injury Risk  Goal: Absence of Fall and Fall-Related Injury  Outcome: Ongoing, Progressing  Intervention: Identify and Manage Contributors to Fall Injury Risk  Recent Flowsheet Documentation  Taken 8/11/2021 1400 by Sahra Carpenter RN  Medication Review/Management: medications reviewed  Taken 8/11/2021 1200 by Sahra Carpenter RN  Medication Review/Management: medications reviewed  Taken 8/11/2021 1000 by Sahra Carpenter RN  Medication Review/Management: medications reviewed  Taken 8/11/2021 0800 by Sahra Carpenter RN  Medication Review/Management: medications reviewed  Intervention: Promote Injury-Free Environment  Recent Flowsheet Documentation  Taken 8/11/2021 1400 by Sahra Carpenter RN  Safety Promotion/Fall Prevention:   safety round/check completed   fall prevention program maintained  Taken 8/11/2021 1200 by Sahra Carpenter RN  Safety Promotion/Fall Prevention:   safety round/check completed   fall prevention program maintained  Taken 8/11/2021 1000 by Sahra Carpenter RN  Safety Promotion/Fall Prevention:   safety round/check completed   fall prevention program maintained  Taken 8/11/2021 0800 by Sahra Carpenter RN  Safety Promotion/Fall Prevention:   safety round/check completed   fall prevention program maintained     Problem: Adult Inpatient Plan of Care  Goal: Plan of Care Review  Outcome: Ongoing, Progressing  Goal: Patient-Specific Goal (Individualized)  Outcome: Ongoing, Progressing  Goal: Absence of Hospital-Acquired Illness or Injury  Outcome: Ongoing, Progressing  Intervention: Identify and Manage Fall Risk  Recent Flowsheet Documentation  Taken 8/11/2021 1400 by Sahra Carpenter RN  Safety Promotion/Fall Prevention:   safety round/check completed   fall prevention program maintained  Taken 8/11/2021 1200 by Sahra Carpenter  LOKESH Alarcon  Safety Promotion/Fall Prevention:   safety round/check completed   fall prevention program maintained  Taken 8/11/2021 1000 by Sahra Carpenter RN  Safety Promotion/Fall Prevention:   safety round/check completed   fall prevention program maintained  Taken 8/11/2021 0800 by Sahra Carpenter RN  Safety Promotion/Fall Prevention:   safety round/check completed   fall prevention program maintained  Intervention: Prevent Skin Injury  Recent Flowsheet Documentation  Taken 8/11/2021 0800 by Sahra Carpenter RN  Skin Protection: incontinence pads utilized  Goal: Optimal Comfort and Wellbeing  Outcome: Ongoing, Progressing  Intervention: Provide Person-Centered Care  Recent Flowsheet Documentation  Taken 8/11/2021 1200 by Sahra Carpenter RN  Trust Relationship/Rapport:   care explained   choices provided   emotional support provided  Taken 8/11/2021 0800 by Sahra Carpenter RN  Trust Relationship/Rapport:   care explained   choices provided   emotional support provided   empathic listening provided  Goal: Readiness for Transition of Care  Outcome: Ongoing, Progressing     Problem: Skin Injury Risk Increased  Goal: Skin Health and Integrity  Outcome: Ongoing, Progressing  Intervention: Optimize Skin Protection  Recent Flowsheet Documentation  Taken 8/11/2021 0800 by Sahra Carpenter RN  Pressure Reduction Techniques: frequent weight shift encouraged  Pressure Reduction Devices: positioning supports utilized  Skin Protection: incontinence pads utilized   Goal Outcome Evaluation:

## 2021-08-11 NOTE — PLAN OF CARE
Goal Outcome Evaluation:      eBnson Mclean presents with ADL impairments below baseline abilities which indicate the need for continued skilled intervention while inpatient. Tolerating session today without incident. Will continue to follow and progress as tolerated. Pt has been in functional decline for the past 8 months at least. Has been in rehab. Expect pt to return to his rehab at d/c. Pt needs mod (A) & RW to transfer & is not able to walk. Functionally max (A) for ADL but able to do a little more if encouraged and assisted with skilled scaffolding to the tasks.

## 2021-08-11 NOTE — PROGRESS NOTES
Referring Provider: Jose Guadalupe Corea DO    Reason for follow-up:  Atrial fibrillation  Cardiomyopathy  Shortness of breath  Bradycardia    Patient Care Team:  Samantha Zabala APRN as PCP - General  Samantha Zabala APRN as PCP - Family Medicine  Jose G Rm MD as Consulting Physician (Cardiology)    Subjective .  Feeling better     ROS  Patient had problems with hypoxia.    Since I have last seen him, the patient has been without any chest discomfort ,shortness of breath, palpitations, dizziness or syncope.  Denies having any headache ,abdominal pain ,nausea, vomiting , diarrhea constipation, loss of weight or loss of appetite.  Denies having any excessive bruising ,hematuria or blood in the stool.    Review of all systems negative except as indicated    History  Past Medical History:   Diagnosis Date   • Appetite loss    • Brain bleed (CMS/HCC)    • Carpal tunnel syndrome on left     carpal tunnel on left hand   • CHF (congestive heart failure) (CMS/HCC)    • Diabetic neuropathy (CMS/HCC)    • DM2 (diabetes mellitus, type 2) (CMS/HCC)    • History of angina    • Hyperlipidemia    • Hypogonadism in male    • Obesity    • Sleep apnea    • Stroke (CMS/HCC)    • Unsteady gait        Past Surgical History:   Procedure Laterality Date   • ANGIOPLASTY      X2   • APPENDECTOMY     • CARDIAC CATHETERIZATION  11/13/2015   • CARDIAC CATHETERIZATION N/A 5/24/2021    Procedure: Left Heart Cath and coronary angiogram;  Surgeon: Jose G Rm MD;  Location: [facility] CATH INVASIVE LOCATION;  Service: Cardiovascular;  Laterality: N/A;   • CARDIAC CATHETERIZATION  5/24/2021    Procedure: Saphenous Vein Graft;  Surgeon: Jose G Rm MD;  Location: [facility] CATH INVASIVE LOCATION;  Service: Cardiovascular;;   • CARDIAC CATHETERIZATION N/A 5/24/2021    Procedure: Percutaneous Coronary Intervention;  Surgeon: Bernabe Zambrano MD;  Location: [facility] CATH INVASIVE LOCATION;  Service: Cardiology;  Laterality: N/A;   • CARDIAC  CATHETERIZATION N/A 5/24/2021    Procedure: Stent NIELS coronary;  Surgeon: Bernabe Zambrano MD;  Location:  [facility] CATH INVASIVE LOCATION;  Service: Cardiology;  Laterality: N/A;   • CARDIAC CATHETERIZATION N/A 5/26/2021    Procedure: Percutaneous Coronary Intervention;  Surgeon: Bernabe Zambrano MD;  Location:  [facility] CATH INVASIVE LOCATION;  Service: Cardiovascular;  Laterality: N/A;   • CARDIAC CATHETERIZATION N/A 5/26/2021    Procedure: Stent NIELS bypass graft;  Surgeon: Bernabe Zambrano MD;  Location:  [facility] CATH INVASIVE LOCATION;  Service: Cardiovascular;  Laterality: N/A;   • CARDIAC ELECTROPHYSIOLOGY PROCEDURE N/A 5/24/2021    Procedure: Temporary Pacemaker;  Surgeon: Bernabe Zambrano MD;  Location:  [facility] CATH INVASIVE LOCATION;  Service: Cardiology;  Laterality: N/A;   • CARDIAC ELECTROPHYSIOLOGY PROCEDURE N/A 5/26/2021    Procedure: Temporary Pacemaker;  Surgeon: Bernabe Zambrano MD;  Location: [facility] CATH INVASIVE LOCATION;  Service: Cardiovascular;  Laterality: N/A;   • CARPAL TUNNEL RELEASE Left 04/29/2018    carpal tunnel- lt hand// other hand surgeries    • CATARACT EXTRACTION, BILATERAL  2002    Dr. Lux Acosta   • CHOLECYSTECTOMY     • COLON RESECTION  2005   • CORONARY ANGIOPLASTY     • CORONARY ANGIOPLASTY WITH STENT PLACEMENT  11/13/2015    PTCA stent to proximal in stent and mid to distal lad   • CORONARY ANGIOPLASTY WITH STENT PLACEMENT  09/16/2016    PTCA stent to mid lad and stent to vein graft to marginal   • CORONARY ARTERY BYPASS GRAFT  2005    @ [facility]   • CYST REMOVAL      cyst removed from scrotum   • FOOT SURGERY Right 07/17/2018   • FOOT SURGERY Left 02/04/2019   • TOE AMPUTATION Right     right toe removed D/T infected cut that went to the bone       Family History   Problem Relation Age of Onset   • Heart disease Mother    • Heart disease Father    • Diabetes Sister    • Heart disease Sister    • Diabetes Brother    • Mental illness Brother        Social History     Tobacco Use   • Smoking status:  Former Smoker     Packs/day: 1.00     Years: 60.00     Pack years: 60.00     Types: Cigarettes   • Smokeless tobacco: Never Used   • Tobacco comment: Patient quit smoking 11/2020   Vaping Use   • Vaping Use: Never used   Substance Use Topics   • Alcohol use: No   • Drug use: Yes     Frequency: 1.0 times per week     Types: Marijuana     Comment: socially        Medications Prior to Admission   Medication Sig Dispense Refill Last Dose   • albuterol sulfate  (90 Base) MCG/ACT inhaler Inhale 2 puffs Every 4 (Four) Hours As Needed.      • apixaban (ELIQUIS) 5 MG tablet tablet Take 5 mg by mouth Daily.      • aspirin 81 MG chewable tablet Chew 81 mg Daily.      • atorvastatin (LIPITOR) 40 MG tablet Take 40 mg by mouth Every Night.      • cholecalciferol (VITAMIN D3) 25 MCG (1000 UT) tablet Take 1,000 Units by mouth Daily.      • dextrose (GLUTOSE) 40 % gel Take  by mouth Every 1 (One) Hour As Needed for Low Blood Sugar.      • donepezil (ARICEPT) 10 MG tablet Take 10 mg by mouth Every Night.      • DULoxetine HCl (DRIZALMA) 60 MG capsule Take 60 mg by mouth Daily.      • gabapentin (NEURONTIN) 100 MG capsule Take 2 capsules by mouth 2 (two) times a day. 6 capsule 0    • hydrALAZINE (APRESOLINE) 50 MG tablet Take 50 mg by mouth 2 (two) times a day. Hold for SBP <110      • isosorbide mononitrate (IMDUR) 60 MG 24 hr tablet Take 60 mg by mouth 2 (Two) Times a Day.      • metoprolol succinate XL (TOPROL-XL) 100 MG 24 hr tablet Take 1 tablet by mouth Daily for 30 days. 30 tablet 0    • nitroglycerin (NITROSTAT) 0.4 MG SL tablet Place 1 tablet under the tongue Every 5 (Five) Minutes As Needed for Chest Pain (Only if SBP Greater Than 100). Take no more than 3 doses in 15 minutes. 30 tablet 12    • spironolactone (ALDACTONE) 25 MG tablet Take 1 tablet by mouth Daily for 30 days. 30 tablet 0    • tiotropium (Spiriva HandiHaler) 18 MCG per inhalation capsule Place 1 capsule into inhaler and inhale Daily. 30 capsule 1    •  "vitamin B-12 (CYANOCOBALAMIN) 1000 MCG tablet Take 1,000 mcg by mouth Daily.      • [DISCONTINUED] furosemide (LASIX) 40 MG tablet Take 1 tablet by mouth 2 (Two) Times a Day for 30 days. 60 tablet 0        Allergies  Codeine    Scheduled Meds:amiodarone, 200 mg, Oral, Q12H  apixaban, 5 mg, Oral, Q12H  aspirin, 81 mg, Oral, Daily  atorvastatin, 40 mg, Oral, Nightly  carvedilol, 6.25 mg, Oral, BID With Meals  cholecalciferol, 1,000 Units, Oral, Daily  dilTIAZem CD, 240 mg, Oral, Q24H  donepezil, 10 mg, Oral, Nightly  DULoxetine, 60 mg, Oral, Daily  furosemide, 40 mg, Oral, BID  gabapentin, 200 mg, Oral, Q12H  hydrALAZINE, 50 mg, Oral, Q12H  insulin lispro, 0-9 Units, Subcutaneous, TID AC  ipratropium-albuterol, 3 mL, Nebulization, 4x Daily - RT  isosorbide mononitrate, 60 mg, Oral, BID - Nitrates  sodium chloride, 10 mL, Intravenous, Q12H  vitamin B-12, 1,000 mcg, Oral, Daily      Continuous Infusions:   PRN Meds:.•  acetaminophen **OR** acetaminophen **OR** acetaminophen  •  aluminum-magnesium hydroxide-simethicone  •  dextrose  •  dextrose  •  glucagon (human recombinant)  •  HYDROcodone-acetaminophen  •  insulin lispro **AND** insulin lispro  •  melatonin  •  nitroglycerin  •  ondansetron **OR** ondansetron  •  [COMPLETED] Insert peripheral IV **AND** sodium chloride  •  sodium chloride    Objective     VITAL SIGNS  Vitals:    08/10/21 2353 08/11/21 0215 08/11/21 0334 08/11/21 0559   BP:  104/61  112/69   BP Location:  Left arm  Left arm   Patient Position:  Lying  Lying   Pulse: 103 92 79 85   Resp: 18 12 18 10   Temp:  98.5 °F (36.9 °C)  98.7 °F (37.1 °C)   TempSrc:  Oral  Axillary   SpO2: 98% 99% 99% 99%   Weight:    115 kg (253 lb 8.5 oz)   Height:           Flowsheet Rows      First Filed Value   Admission Height  180.3 cm (71\") Documented at 08/03/2021 1733   Admission Weight  113 kg (250 lb) Documented at 08/03/2021 1733            Intake/Output Summary (Last 24 hours) at 8/11/2021 0638  Last data filed at " 8/10/2021 1956  Gross per 24 hour   Intake 400 ml   Output 300 ml   Net 100 ml        TELEMETRY: Atrial fibrillation with controlled ventricular response.    Physical Exam:  The patient is alert, oriented and in no distress.  Vital signs as noted above.  Exogenous obesity.  Head and neck revealed no carotid bruits or jugular venous distention.  No thyromegaly or lymphadenopathy is present  Lungs clear.  No wheezing.  Breath sounds are normal bilaterally.  Heart normal first and second heart sounds.  No murmur. No precordial rub is present.  No gallop is present.  Abdomen soft and nontender.  No organomegaly is present.  Extremities with good peripheral pulses without any pedal edema.  Skin warm and dry.  Musculoskeletal system is grossly normal  CNS grossly normal      Results Review:   I reviewed the patient's new clinical results.  Lab Results (last 24 hours)     Procedure Component Value Units Date/Time    Potassium [[id number]]  (Abnormal) Collected: 08/10/21 2304    Specimen: Blood Updated: 08/10/21 2330     Potassium 5.9 mmol/L     Blood Gas, Arterial - [[id number]]  (Abnormal) Collected: 08/10/21 2240    Specimen: Arterial Blood Updated: 08/10/21 2243     Site Left Radial     Fitz's Test Positive     pH, Arterial 7.342 pH units      pCO2, Arterial 66.1 mm Hg      pO2, Arterial 108.5 mm Hg      HCO3, Arterial 35.8 mmol/L      Base Excess, Arterial 8.8 mmol/L      Comment: Serial Number: [id number]:  [id number]        O2 Saturation, Arterial 97.6 %      CO2 Content 37.8 mmol/L      Barometric Pressure for Blood Gas --     Comment: N/A        Modality Adult Vent     FIO2 70 %      Ventilator Mode ;NIV     PEEP 6     PSV 6 cmH2O      Hemodilution No     Respiratory Rate 18    Blood Gas, Arterial - [[id number]]  (Abnormal) Collected: 08/10/21 2122    Specimen: Arterial Blood Updated: 08/10/21 2142     Site Left Radial     Fitz's Test Positive     pH, Arterial 7.295 pH units      pCO2, Arterial 74.1 mm Hg      pO2,  Arterial 93.3 mm Hg      HCO3, Arterial 36.0 mmol/L      Base Excess, Arterial 8.0 mmol/L      Comment: Serial Number: [id number]:  [id number]        O2 Saturation, Arterial 95.8 %      CO2 Content 38.3 mmol/L      Barometric Pressure for Blood Gas --     Comment: N/A        Modality CPAP     FIO2 70 %      PEEP 10     Hemodilution No    Potassium [[id number]]  (Abnormal) Collected: 08/10/21 1921    Specimen: Blood Updated: 08/10/21 2014     Potassium 5.5 mmol/L     POC Glucose Once [[id number]]  (Abnormal) Collected: 08/10/21 1908    Specimen: Blood Updated: 08/10/21 1910     Glucose 113 mg/dL      Comment: Serial Number: [id number]:  [id number]       Potassium [[id number]]  (Abnormal) Collected: 08/10/21 1614    Specimen: Blood Updated: 08/10/21 1654     Potassium 5.3 mmol/L     POC Glucose Once [[id number]]  (Abnormal) Collected: 08/10/21 1630    Specimen: Blood Updated: 08/10/21 1632     Glucose 117 mg/dL      Comment: Serial Number: [id number]:  [id number]       Potassium [[id number]]  (Abnormal) Collected: 08/10/21 1249    Specimen: Blood Updated: 08/10/21 1352     Potassium 5.6 mmol/L     POC Glucose Once [[id number]]  (Abnormal) Collected: 08/10/21 1100    Specimen: Blood Updated: 08/10/21 1101     Glucose 223 mg/dL      Comment: Serial Number: [id number]:  [id number]             Imaging Results (Last 24 Hours)     ** No results found for the last 24 hours. **      LAB RESULTS (LAST 7 DAYS)    CBC  Results from last 7 days   Lab Units 08/06/21  0625 08/05/21  0300   WBC 10*3/mm3 5.70 6.40   RBC 10*6/mm3 2.74* 2.45*   HEMOGLOBIN g/dL 8.1* 7.0*   HEMATOCRIT % 24.0* 21.0*   MCV fL 87.4 86.0   PLATELETS 10*3/mm3 307 310       BMP  Results from last 7 days   Lab Units 08/10/21  2304 08/10/21  1921 08/10/21  1614 08/10/21  1249 08/10/21  0221 08/07/21  0314 08/06/21  0625 08/05/21  0300 08/05/21  0300   SODIUM mmol/L  --   --   --   --  132* 138 139  --  141   POTASSIUM mmol/L 5.9* 5.5* 5.3* 5.6* 5.7*  4.6 4.4   < > 4.6   CHLORIDE mmol/L  --   --   --   --  89* 95* 96*  --  98   CO2 mmol/L  --   --   --   --  33.0* 33.0* 34.0*  --  31.0*   BUN mg/dL  --   --   --   --  71* 67* 66*  --  68*   CREATININE mg/dL  --   --   --   --  2.67* 2.35* 2.19*  --  2.04*   GLUCOSE mg/dL  --   --   --   --  101* 139* 66  --  71   MAGNESIUM mg/dL  --   --   --   --   --   --   --   --  2.1   PHOSPHORUS mg/dL  --   --   --   --   --   --   --   --  5.1*    < > = values in this interval not displayed.       CMP   Results from last 7 days   Lab Units 08/10/21  2304 08/10/21  1921 08/10/21  1614 08/10/21  1249 08/10/21  0221 08/07/21  0314 08/06/21  0625 08/05/21  0300 08/05/21  0300   SODIUM mmol/L  --   --   --   --  132* 138 139  --  141   POTASSIUM mmol/L 5.9* 5.5* 5.3* 5.6* 5.7* 4.6 4.4   < > 4.6   CHLORIDE mmol/L  --   --   --   --  89* 95* 96*  --  98   CO2 mmol/L  --   --   --   --  33.0* 33.0* 34.0*  --  31.0*   BUN mg/dL  --   --   --   --  71* 67* 66*  --  68*   CREATININE mg/dL  --   --   --   --  2.67* 2.35* 2.19*  --  2.04*   GLUCOSE mg/dL  --   --   --   --  101* 139* 66  --  71    < > = values in this interval not displayed.         BNP        TROPONIN        CoAg        Creatinine Clearance  Estimated Creatinine Clearance: 31.2 mL/min (A) (by C-G formula based on SCr of 2.67 mg/dL (H)).    ABG  Results from last 7 days   Lab Units 08/10/21  2240 08/10/21  2122   PH, ARTERIAL pH units 7.342* 7.295*   PCO2, ARTERIAL mm Hg 66.1* 74.1*   PO2 ART mm Hg 108.5* 93.3   O2 SATURATION ART % 97.6 95.8   BASE EXCESS ART mmol/L 8.8* 8.0*       Radiology  No radiology results for the last day            EKG              I personally viewed and interpreted the patient's EKG/Telemetry data: Atrial fibrillation  ECHOCARDIOGRAM:    Results for orders placed during the hospital encounter of 07/20/21    Adult Transthoracic Echo Complete With Contrast if Necessary Per Protocol    Interpretation Summary  · Estimated left ventricular EF =  30% Left ventricular systolic function is moderately decreased.    Occasions  Shortness of breath.    Technically satisfactory study.  Mitral valve is structurally normal.  Mild mitral regurgitation.  Tricuspid valve is structurally normal.  Aortic valve is structurally normal.  Pulmonic valve could not be well visualized.  No evidence for tricuspid or aortic regurgitation is seen by Doppler study.  Biatrial and biventricular enlargement is present.  Diffuse left ventricular hypocontractility with ejection fraction of 30%.  Atrial septum is intact.  Aorta is normal.  No pericardial effusion or intracardiac thrombus is seen.    Impression  Mild mitral regurgitation.  Significant biatrial and biventricular enlargement.  Diffuse left ventricular hypocontractility with ejection fraction of 30%.  No pericardial effusion or intracardiac thrombus is seen.          STRESS MYOVIEW:    Cardiolite (Tc-99m Sestamibi) stress test    CARDIAC CATHETERIZATION:            OTHER:         Assessment/Plan     Principal Problem:    Atrial fibrillation with RVR (CMS/HCC)  Active Problems:    S/P CABG (coronary artery bypass graft)    Essential hypertension    Elevated troponin    ANNETTE treated with BiPAP    Mixed hyperlipidemia    Flaccid hemiplegia of right dominant side as late effect of cerebral infarction (CMS/HCC)    Diabetic neuropathy (CMS/HCC)    Unspecified dementia with behavioral disturbance (CMS/HCC)    Coronary artery disease    Chronic venous hypertension (idiopathic) with ulcer and inflammation of bilateral lower extremity (CMS/HCC)    Chronic obstructive pulmonary disease, unspecified (CMS/HCC)    Chronic foot ulcer (CMS/HCC)    CKD (chronic kidney disease) stage 3, GFR 30-59 ml/min (CMS/HCC)    Tobacco use disorder    Acute on chronic systolic heart failure (CMS/HCC)    Anemia of renal disease      [[[[[[[[[[[[[[[[[[[[[[[[[    Impression  ==============  -Increased shortness of breath and palpitations     -Recent acute on  chronic congestive heart failure.  Compensated at this time  Recent echocardiogram showed left ventricle dysfunction with ejection fraction of 35%.     -History of right-sided weakness with left MCA territory stroke.-Improved      -status post CABG 2005. status post stent to LAD 2009    Status post stent to LAD 11/13/2015  Status post stent to mid LAD and SVG to marginal branch 09/16/2016.  Status post stent to mid LAD 5/24/2021  Status post stent to SVG to RCA 5/26/2021     Cardiac catheterization 5/24/2021 revealed  Left ventricle angiogram was not performed due to pre-existing renal dysfunction.  Left main coronary artery normal.  Left anterior descending artery has mid segment lengthy 90% disease within the previously placed stent.  Distal left into descending artery has diffuse disease.  Circumflex coronary artery is totally occluded.  (Chronic)  Right coronary artery is chronically occluded.  SVG to marginal branch (jump graft to OM1 and OM 2) is totally occluded (new finding compared to 2015.  SVG to PDA has distal radiolucency.  However no definite obstructive disease is present.       -status post myocardial infarction 2000 and 2002 .      -history of intermittent atrial fibrillation-maintaining sinus rhythm     Transesophageal echocardiogram 11/30/2020.  Biatrial enlargement.  Smoking effect in the left atrium and left ventricle and left atrial appendage.  Mild mitral and aortic regurgitation.  Left ventricle enlargement with diffuse hypocontractility with ejection fraction of 35 to 40%.     Echocardiogram 11/27/2020 revealed left atrial enlargement left ventricle dysfunction with ejection fraction of 40%.  Negative bubble study.      -diabetes hypertension and sleep apnea in history of renal dysfunction .  Recent BUN/creatinine 38/1.36     -status post colon surgery (partial colectomy) appendectomy cholecystectomy right 4th toe removal and carpal tunnel surgery      -continued smoking 1 pack per day  -abstinence from smoking      -allergy to codeine.  ============   Plan  ================  Atrial fibrillation-chronic  Patient is off intravenous Cardizem.  Precordial has improved.  Continue Coreg and p.o. Cardizem and amiodarone 200 mg once a day.  Observe for any further episodes of bradycardia.    Troponin level is slightly elevated  0.11  0.41  No bump up in troponin.    Significant renal dysfunction-chronic  BUN/68/1.91-8/4/2021.  BUN 64 creatinine 1.95-8/2/2021  68/2.04-8/5/2021  67/2.35-8/9/2021  71/2.67-8/10/2021    Hyperkalemia  k 5.7  Patient is off spironolactone.  Patient is not on any potassium supplements.  Renal follow-up in progress.    Anemia  hgb 7.1  7-8/5/2021  7 PRBC  Receiving iron transfusion.  8.1-8/9/2021     Status post CABG  Patient is not having any angina pectoris  Status post stent to LAD 5/24/2021.  Status post stent to SVG to RCA 5/26/2021.       Anticoagulation  Patient is on Eliquis.    Medications were reviewed and updated.  Current medications include amiodarone 200 mg a day Eliquis aspirin atorvastatin Coreg Cardizem CD Lasix gabapentin hydralazine Imdur.    Further plan will depend on patient's progress.   ]]]]]]]]]]]]]]]]]]]]]]           Jose G Rm MD  08/11/21  06:38 EDT

## 2021-08-11 NOTE — PLAN OF CARE
He has profoundly limited tolerance to activity, further worsened by pt poor resp status. Following removal of CPAP and placement of NC, pt mobilizes to EOB. He demonstrates desaturation from 98% SpO2 to 85% with activity, despite supplemental O2 and appears cyanotic with difficulty maintaining eyes open > than a few seconds. Response is delayed. Pt is immediately returned to supine, CPAP replaced and pt positioned in bed for comfort. SpO2 improves, as well as pts pallor, though he remains lethargic. RN notified. Will continue to follow and progress as tolerated.

## 2021-08-11 NOTE — PROGRESS NOTES
"RENAL/KCC:     LOS: 7 days    Patient Care Team:  Samantha Zabala APRN as PCP - General  Samantha Zabala APRN as PCP - Family Medicine  Jose G Rm MD as Consulting Physician (Cardiology)    Chief Complaint:  Palpitations    Subjective     Interval History:   SOA    Objective     Vital Sign Min/Max for last 24 hours  Temp  Min: 98.2 °F (36.8 °C)  Max: 98.7 °F (37.1 °C)   BP  Min: 104/61  Max: 127/67   Pulse  Min: 63  Max: 110   Resp  Min: 10  Max: 20   SpO2  Min: 65 %  Max: 99 %   Flow (L/min)  Min: 3  Max: 15   Weight  Min: 115 kg (253 lb 8.5 oz)  Max: 115 kg (253 lb 8.5 oz)     Flowsheet Rows      First Filed Value   Admission Height  180.3 cm (71\") Documented at 08/03/2021 1733   Admission Weight  113 kg (250 lb) Documented at 08/03/2021 1733          I/O this shift:  In: 680 [P.O.:680]  Out: 400 [Urine:400]  I/O last 3 completed shifts:  In: 400 [P.O.:400]  Out: 300 [Urine:300]    Physical Exam:  GEN: Awake, NAD  ENT: PERRL, EOMI, MMM  NECK: Supple, no JVD  CHEST: CTAB, no W/R/C  CV: RRR, no M/G/R  ABD: Soft, NT, +BS  SKIN: Warm and Dry  NEURO: CN's intact      WBC No results found for: WBC   HGB No results found for: HGB   HCT No results found for: HCT   Platlets No results found for: LABPLAT   MCV No results found for: MCV       Sodium Sodium   Date Value Ref Range Status   08/11/2021 136 136 - 145 mmol/L Final   08/10/2021 132 (L) 136 - 145 mmol/L Final      Potassium Potassium   Date Value Ref Range Status   08/11/2021 4.8 3.5 - 5.2 mmol/L Final   08/11/2021 5.4 (H) 3.5 - 5.2 mmol/L Final     Comment:     Slight hemolysis detected by analyzer. Results may be affected.   08/11/2021 5.4 (H) 3.5 - 5.2 mmol/L Final     Comment:     Slight hemolysis detected by analyzer. Results may be affected.   08/10/2021 5.9 (H) 3.5 - 5.2 mmol/L Final   08/10/2021 5.5 (H) 3.5 - 5.2 mmol/L Final   08/10/2021 5.3 (H) 3.5 - 5.2 mmol/L Final   08/10/2021 5.6 (H) 3.5 - 5.2 mmol/L Final   08/10/2021 5.7 (H) 3.5 - 5.2 mmol/L " Final      Chloride Chloride   Date Value Ref Range Status   08/11/2021 92 (L) 98 - 107 mmol/L Final   08/10/2021 89 (L) 98 - 107 mmol/L Final      CO2 CO2   Date Value Ref Range Status   08/11/2021 34.0 (H) 22.0 - 29.0 mmol/L Final   08/10/2021 33.0 (H) 22.0 - 29.0 mmol/L Final      BUN BUN   Date Value Ref Range Status   08/11/2021 72 (H) 8 - 23 mg/dL Final   08/10/2021 71 (H) 8 - 23 mg/dL Final      Creatinine Creatinine   Date Value Ref Range Status   08/11/2021 2.81 (H) 0.76 - 1.27 mg/dL Final   08/10/2021 2.67 (H) 0.76 - 1.27 mg/dL Final      Calcium Calcium   Date Value Ref Range Status   08/11/2021 8.9 8.6 - 10.5 mg/dL Final   08/10/2021 8.9 8.6 - 10.5 mg/dL Final      PO4 No results found for: CAPO4   Albumin Albumin   Date Value Ref Range Status   08/11/2021 3.70 3.50 - 5.20 g/dL Final      Magnesium No results found for: MG   Uric Acid No results found for: URICACID        Results Review:     I reviewed the patient's new clinical results.    amiodarone, 200 mg, Oral, Q12H  apixaban, 5 mg, Oral, Q12H  aspirin, 81 mg, Oral, Daily  atorvastatin, 40 mg, Oral, Nightly  carvedilol, 6.25 mg, Oral, BID With Meals  cholecalciferol, 1,000 Units, Oral, Daily  dilTIAZem CD, 240 mg, Oral, Q24H  donepezil, 10 mg, Oral, Nightly  DULoxetine, 60 mg, Oral, Daily  furosemide, 80 mg, Intravenous, BID  gabapentin, 200 mg, Oral, Q12H  hydrALAZINE, 50 mg, Oral, Q12H  insulin lispro, 0-9 Units, Subcutaneous, TID AC  ipratropium-albuterol, 3 mL, Nebulization, 4x Daily - RT  isosorbide mononitrate, 60 mg, Oral, BID - Nitrates  sodium chloride, 10 mL, Intravenous, Q12H  vitamin B-12, 1,000 mcg, Oral, Daily           Medication Review: Reviewed    Assessment/Plan       Atrial fibrillation with RVR (CMS/[facility])    S/P CABG (coronary artery bypass graft)    Essential hypertension    Elevated troponin    ANNETTE treated with BiPAP    Mixed hyperlipidemia    Flaccid hemiplegia of right dominant side as late effect of cerebral infarction  (CMS/[facility])    Diabetic neuropathy (CMS/[facility])    Unspecified dementia with behavioral disturbance (CMS/[facility])    Coronary artery disease    Chronic venous hypertension (idiopathic) with ulcer and inflammation of bilateral lower extremity (CMS/[facility])    Chronic obstructive pulmonary disease, unspecified (CMS/[facility])    Chronic foot ulcer (CMS/[facility])    CKD (chronic kidney disease) stage 3, GFR 30-59 ml/min (CMS/[facility])    Tobacco use disorder    Acute on chronic systolic heart failure (CMS/[facility])    Anemia of renal disease      1. DILEEP  2. CKD III  3. Hyperkalemia  4. Pulmonary edema  5. Tachycardia  6. CHF  7. T2DM  8. Hypertension     PLAN:  Cr up to 2.8.  Lytes OK (K improving).  CXR with worsening edema.  Titrate IV Lasix.  Reassess in AM.       Yony hBat M.D.  [facility]

## 2021-08-11 NOTE — THERAPY TREATMENT NOTE
Subjective: Pt agreeable to therapeutic plan of care. RN agreeable to tx as well.     Objective:     Bed mobility - Min-A, Mod-A and Assist x 2  Transfers - N/A or Not attempted.  Ambulation -  N/A or Not attempted.    Pain: Does not report pain, chronic LBP.     VITALS: HR 55 -70 bom throughout session. SpO2 98% on hospital CPAP, doffed CPAP and donned 5L via HF NC - pt maintains SpO2 90 at rest, but desaturates to 85% with transfer, unable to improve thus, returned to supine and CPAP re-placed at end of session with improvement to 90% before writer leaves room.    Education: Provided education on importance of mobility and skilled verbal / tactile cueing throughout intervention.     Assessment: Benson Mclean presents with functional mobility impairments which indicate the need for skilled intervention. Tolerating session today without incident. He has profoundly limited tolerance to activity, further worsened by pt poor resp status. Following removal of CPAP and placement of NC, pt mobilizes to EOB. He demonstrates desaturation from 98% SpO2 to 85% with activity, despite supplemental O2 and appears cyanotic with difficulty maintaining eyes open > than a few seconds. Response is delayed. Pt is immediately returned to supine, CPAP replaced and pt positioned in bed for comfort. SpO2 improves, as well as pts pallor, though he remains lethargic. RN notified. Will continue to follow and progress as tolerated.     Plan/Recommendations:   Pt would benefit from Inpatient Rehabilitation placement at discharge from facility and requires no DME at discharge.   Pt desires Inpatient Rehabilitation placement at discharge. Pt cooperative; agreeable to therapeutic recommendations and plan of care.     Basic Mobility 6-click:  Rollin = Total, A lot = 2, A little = 3; 4 = None  Supine>Sit:   1 = Total, A lot = 2, A little = 3; 4 = None   Sit>Stand with arms:  1 = Total, A lot = 2, A little = 3; 4 = None  Bed>Chair:    1 = Total, A lot = 2, A little = 3; 4 = None  Ambulate in room:  1 = Total, A lot = 2, A little = 3; 4 = None  3-5 Steps with railin = Total, A lot = 2, A little = 3; 4 = None  Score: 10    Modified [place]: 5 = Severe disability (Requires constant nursing care and attention, bedridden, incontinent)    Post-Tx Position: Supine with HOB Elevated, Alarms activated and Call light and personal items within reach     PPE: gloves, surgical mask, eyewear protection

## 2021-08-11 NOTE — NURSING NOTE
PT dropped sats into the 60's, and team RM put pt on a nonrebreather for 2 mins.  PT currently on high flow NC at 10L.  NP Candy Beaulieu contacted and this RN requested a pulmonary consult, as pt is scheduled to be dc'd tomorrow at this time.  NP put in pulmonary consult, and instructed this RN to put pt on precision flow and get ABG if pt became hypoxic again. RT notified at this time.

## 2021-08-11 NOTE — PLAN OF CARE
Goal Outcome Evaluation:  Plan of Care Reviewed With: patient    Progress: declining  Outcome Summary: Pt's respiratory status declined at the beginning of the shift. Slept with hospital bipap on and seemed to rest well--vital signs stable. Placed call to dr daily regarding elevated potassium level--orders received and recheck in AM.

## 2021-08-12 LAB
ALBUMIN SERPL-MCNC: 3.6 G/DL (ref 3.5–5.2)
ANION GAP SERPL CALCULATED.3IONS-SCNC: 10 MMOL/L (ref 5–15)
BUN SERPL-MCNC: 77 MG/DL (ref 8–23)
BUN/CREAT SERPL: 26.3 (ref 7–25)
CALCIUM SPEC-SCNC: 8.4 MG/DL (ref 8.6–10.5)
CHLORIDE SERPL-SCNC: 89 MMOL/L (ref 98–107)
CO2 SERPL-SCNC: 33 MMOL/L (ref 22–29)
CREAT SERPL-MCNC: 2.93 MG/DL (ref 0.76–1.27)
GFR SERPL CREATININE-BSD FRML MDRD: 21 ML/MIN/1.73
GLUCOSE BLDC GLUCOMTR-MCNC: 179 MG/DL (ref 70–105)
GLUCOSE BLDC GLUCOMTR-MCNC: 223 MG/DL (ref 70–105)
GLUCOSE BLDC GLUCOMTR-MCNC: 225 MG/DL (ref 70–105)
GLUCOSE BLDC GLUCOMTR-MCNC: 230 MG/DL (ref 70–105)
GLUCOSE SERPL-MCNC: 161 MG/DL (ref 65–99)
PHOSPHATE SERPL-MCNC: 5.3 MG/DL (ref 2.5–4.5)
POTASSIUM SERPL-SCNC: 4.6 MMOL/L (ref 3.5–5.2)
POTASSIUM SERPL-SCNC: 4.7 MMOL/L (ref 3.5–5.2)
POTASSIUM SERPL-SCNC: 4.7 MMOL/L (ref 3.5–5.2)
SODIUM SERPL-SCNC: 132 MMOL/L (ref 136–145)

## 2021-08-12 PROCEDURE — 25010000002 FUROSEMIDE PER 20 MG: Performed by: INTERNAL MEDICINE

## 2021-08-12 PROCEDURE — 97110 THERAPEUTIC EXERCISES: CPT

## 2021-08-12 PROCEDURE — 94799 UNLISTED PULMONARY SVC/PX: CPT

## 2021-08-12 PROCEDURE — 84132 ASSAY OF SERUM POTASSIUM: CPT | Performed by: INTERNAL MEDICINE

## 2021-08-12 PROCEDURE — 94660 CPAP INITIATION&MGMT: CPT

## 2021-08-12 PROCEDURE — 99232 SBSQ HOSP IP/OBS MODERATE 35: CPT | Performed by: INTERNAL MEDICINE

## 2021-08-12 PROCEDURE — 63710000001 INSULIN LISPRO (HUMAN) PER 5 UNITS: Performed by: INTERNAL MEDICINE

## 2021-08-12 PROCEDURE — 82962 GLUCOSE BLOOD TEST: CPT

## 2021-08-12 PROCEDURE — 80069 RENAL FUNCTION PANEL: CPT | Performed by: INTERNAL MEDICINE

## 2021-08-12 RX ORDER — METOLAZONE 5 MG/1
5 TABLET ORAL DAILY
Status: DISCONTINUED | OUTPATIENT
Start: 2021-08-12 | End: 2021-08-21

## 2021-08-12 RX ADMIN — IPRATROPIUM BROMIDE AND ALBUTEROL SULFATE 3 ML: 2.5; .5 SOLUTION RESPIRATORY (INHALATION) at 12:20

## 2021-08-12 RX ADMIN — IPRATROPIUM BROMIDE AND ALBUTEROL SULFATE 3 ML: 2.5; .5 SOLUTION RESPIRATORY (INHALATION) at 16:03

## 2021-08-12 RX ADMIN — DONEPEZIL HYDROCHLORIDE 10 MG: 5 TABLET, FILM COATED ORAL at 20:59

## 2021-08-12 RX ADMIN — APIXABAN 5 MG: 5 TABLET, FILM COATED ORAL at 09:42

## 2021-08-12 RX ADMIN — FUROSEMIDE 80 MG: 10 INJECTION, SOLUTION INTRAMUSCULAR; INTRAVENOUS at 09:44

## 2021-08-12 RX ADMIN — CYANOCOBALAMIN TAB 1000 MCG 1000 MCG: 1000 TAB at 09:42

## 2021-08-12 RX ADMIN — Medication 10 ML: at 20:59

## 2021-08-12 RX ADMIN — IPRATROPIUM BROMIDE AND ALBUTEROL SULFATE 3 ML: 2.5; .5 SOLUTION RESPIRATORY (INHALATION) at 18:59

## 2021-08-12 RX ADMIN — Medication 1000 UNITS: at 09:42

## 2021-08-12 RX ADMIN — AMIODARONE HYDROCHLORIDE 200 MG: 200 TABLET ORAL at 09:42

## 2021-08-12 RX ADMIN — ATORVASTATIN CALCIUM 40 MG: 40 TABLET, FILM COATED ORAL at 20:59

## 2021-08-12 RX ADMIN — INSULIN LISPRO 4 UNITS: 100 INJECTION, SOLUTION INTRAVENOUS; SUBCUTANEOUS at 12:48

## 2021-08-12 RX ADMIN — METOLAZONE 5 MG: 5 TABLET ORAL at 17:18

## 2021-08-12 RX ADMIN — INSULIN LISPRO 4 UNITS: 100 INJECTION, SOLUTION INTRAVENOUS; SUBCUTANEOUS at 17:16

## 2021-08-12 RX ADMIN — IPRATROPIUM BROMIDE AND ALBUTEROL SULFATE 3 ML: 2.5; .5 SOLUTION RESPIRATORY (INHALATION) at 08:42

## 2021-08-12 RX ADMIN — FUROSEMIDE 80 MG: 10 INJECTION, SOLUTION INTRAMUSCULAR; INTRAVENOUS at 20:58

## 2021-08-12 RX ADMIN — APIXABAN 5 MG: 5 TABLET, FILM COATED ORAL at 20:59

## 2021-08-12 RX ADMIN — ISOSORBIDE MONONITRATE 60 MG: 60 TABLET, EXTENDED RELEASE ORAL at 09:42

## 2021-08-12 RX ADMIN — ASPIRIN 81 MG: 81 TABLET, CHEWABLE ORAL at 09:42

## 2021-08-12 RX ADMIN — DULOXETINE 60 MG: 30 CAPSULE, DELAYED RELEASE ORAL at 09:42

## 2021-08-12 RX ADMIN — Medication 10 ML: at 09:44

## 2021-08-12 RX ADMIN — AMIODARONE HYDROCHLORIDE 200 MG: 200 TABLET ORAL at 20:59

## 2021-08-12 RX ADMIN — DILTIAZEM HYDROCHLORIDE 240 MG: 240 CAPSULE, COATED, EXTENDED RELEASE ORAL at 09:42

## 2021-08-12 RX ADMIN — CARVEDILOL 6.25 MG: 6.25 TABLET, FILM COATED ORAL at 09:42

## 2021-08-12 NOTE — THERAPY TREATMENT NOTE
Subjective: Pt agreeable to therapeutic plan of care.    Objective:     Bed mobility - Mod-A and Assist x 2 with sit>supine + positioning in bed.    Transfers - CGA, Min-A, Assist x 2 and with rolling walker - pt with posterior lean, requires mod amount of pull on gait belt to aide with maintaining anterior weight shift over COG. Otherwise is not aware of this deficit and cannot correct on his own.   Ambulation - N/A or Not attempted. non-ambulatory at baseline.     Pain:  Does not report, moderate grimacing noted during mobility.   VITALS: Pt hr in 60-70's throughout. Sats 93% on 6L vial High-flow at rest. Desaturates to low-mid 80's with mobility, recovers to 88-90 with time and rest, but unable to come any higher. Placed on CPAP at end of session with quick recovery to 92% before leaving room.     Education: Provided education on importance of mobility and skilled verbal / tactile cueing throughout intervention.     Assessment: Benson Mclean presents with functional mobility impairments which indicate the need for skilled intervention. Tolerating session today without incident. Continues to be limited with functional mobility due to poor endurance and poor respiratory status. Requiring CG-Min with transfers, but once standing is requiring Mod A to maintain weight shift anteriorly over COG. Will continue to follow and progress as tolerated.     Plan/Recommendations:   Pt would benefit from Inpatient Rehabilitation placement at discharge from facility and requires no DME at discharge.   Pt desires Inpatient Rehabilitation placement at discharge. Pt cooperative; agreeable to therapeutic recommendations and plan of care.     Basic Mobility 6-click:  Rollin = Total, A lot = 2, A little = 3; 4 = None  Supine>Sit:   1 = Total, A lot = 2, A little = 3; 4 = None   Sit>Stand with arms:  1 = Total, A lot = 2, A little = 3; 4 = None  Bed>Chair:   1 = Total, A lot = 2, A little = 3; 4 = None  Ambulate in room:   1 = Total, A lot = 2, A little = 3; 4 = None  3-5 Steps with railin = Total, A lot = 2, A little = 3; 4 = None  Score: 11    Modified [place]: 4 = Moderately severe disability (Unable to attend to own bodily needs without assistance, and unable to walk unassisted)     Post-Tx Position: Up in Chair, Alarms activated and Call light and personal items within reach     PPE: gloves, surgical mask, eyewear protection

## 2021-08-12 NOTE — PLAN OF CARE
Continues to be limited with functional mobility due to poor endurance and poor respiratory status (see note for specifics). Requiring CG-Min with transfers, but once standing is requiring Mod A to maintain weight shift anteriorly over COG. Will continue to follow and progress as tolerated.

## 2021-08-12 NOTE — PROGRESS NOTES
Referring Provider: Jose Guadalupe Corea DO    Reason for follow-up:  Atrial fibrillation  Cardiomyopathy  Shortness of breath  Bradycardia    Patient Care Team:  Samantha Zabala APRN as PCP - General  Samantha Zabala APRN as PCP - Family Medicine  Jose G Rm MD as Consulting Physician (Cardiology)    Subjective .  Feeling better     ROS  Patient had problems with hypoxia.    Since I have last seen him, the patient has been without any chest discomfort ,shortness of breath, palpitations, dizziness or syncope.  Denies having any headache ,abdominal pain ,nausea, vomiting , diarrhea constipation, loss of weight or loss of appetite.  Denies having any excessive bruising ,hematuria or blood in the stool.    Review of all systems negative except as indicated    History  Past Medical History:   Diagnosis Date   • Appetite loss    • Brain bleed (CMS/HCC)    • Carpal tunnel syndrome on left     carpal tunnel on left hand   • CHF (congestive heart failure) (CMS/HCC)    • Diabetic neuropathy (CMS/HCC)    • DM2 (diabetes mellitus, type 2) (CMS/HCC)    • History of angina    • Hyperlipidemia    • Hypogonadism in male    • Obesity    • Sleep apnea    • Stroke (CMS/HCC)    • Unsteady gait        Past Surgical History:   Procedure Laterality Date   • ANGIOPLASTY      X2   • APPENDECTOMY     • CARDIAC CATHETERIZATION  11/13/2015   • CARDIAC CATHETERIZATION N/A 5/24/2021    Procedure: Left Heart Cath and coronary angiogram;  Surgeon: Jose G Rm MD;  Location: [facility] CATH INVASIVE LOCATION;  Service: Cardiovascular;  Laterality: N/A;   • CARDIAC CATHETERIZATION  5/24/2021    Procedure: Saphenous Vein Graft;  Surgeon: Jose G Rm MD;  Location: [facility] CATH INVASIVE LOCATION;  Service: Cardiovascular;;   • CARDIAC CATHETERIZATION N/A 5/24/2021    Procedure: Percutaneous Coronary Intervention;  Surgeon: Bernabe Zambrano MD;  Location: [facility] CATH INVASIVE LOCATION;  Service: Cardiology;  Laterality: N/A;   • CARDIAC  CATHETERIZATION N/A 5/24/2021    Procedure: Stent NIELS coronary;  Surgeon: Bernabe Zambrano MD;  Location:  [facility] CATH INVASIVE LOCATION;  Service: Cardiology;  Laterality: N/A;   • CARDIAC CATHETERIZATION N/A 5/26/2021    Procedure: Percutaneous Coronary Intervention;  Surgeon: Bernabe Zambrano MD;  Location:  [facility] CATH INVASIVE LOCATION;  Service: Cardiovascular;  Laterality: N/A;   • CARDIAC CATHETERIZATION N/A 5/26/2021    Procedure: Stent NIELS bypass graft;  Surgeon: Bernabe Zambrano MD;  Location:  [facility] CATH INVASIVE LOCATION;  Service: Cardiovascular;  Laterality: N/A;   • CARDIAC ELECTROPHYSIOLOGY PROCEDURE N/A 5/24/2021    Procedure: Temporary Pacemaker;  Surgeon: Bernabe Zambrano MD;  Location:  [facility] CATH INVASIVE LOCATION;  Service: Cardiology;  Laterality: N/A;   • CARDIAC ELECTROPHYSIOLOGY PROCEDURE N/A 5/26/2021    Procedure: Temporary Pacemaker;  Surgeon: Bernabe Zambrano MD;  Location: [facility] CATH INVASIVE LOCATION;  Service: Cardiovascular;  Laterality: N/A;   • CARPAL TUNNEL RELEASE Left 04/29/2018    carpal tunnel- lt hand// other hand surgeries    • CATARACT EXTRACTION, BILATERAL  2002    Dr. Lux Acosta   • CHOLECYSTECTOMY     • COLON RESECTION  2005   • CORONARY ANGIOPLASTY     • CORONARY ANGIOPLASTY WITH STENT PLACEMENT  11/13/2015    PTCA stent to proximal in stent and mid to distal lad   • CORONARY ANGIOPLASTY WITH STENT PLACEMENT  09/16/2016    PTCA stent to mid lad and stent to vein graft to marginal   • CORONARY ARTERY BYPASS GRAFT  2005    @ Health system   • CYST REMOVAL      cyst removed from scrotum   • FOOT SURGERY Right 07/17/2018   • FOOT SURGERY Left 02/04/2019   • TOE AMPUTATION Right     right toe removed D/T infected cut that went to the bone       Family History   Problem Relation Age of Onset   • Heart disease Mother    • Heart disease Father    • Diabetes Sister    • Heart disease Sister    • Diabetes Brother    • Mental illness Brother        Social History     Tobacco Use   • Smoking status:  Former Smoker     Packs/day: 1.00     Years: 60.00     Pack years: 60.00     Types: Cigarettes   • Smokeless tobacco: Never Used   • Tobacco comment: Patient quit smoking 11/2020   Vaping Use   • Vaping Use: Never used   Substance Use Topics   • Alcohol use: No   • Drug use: Yes     Frequency: 1.0 times per week     Types: Marijuana     Comment: socially        Medications Prior to Admission   Medication Sig Dispense Refill Last Dose   • albuterol sulfate  (90 Base) MCG/ACT inhaler Inhale 2 puffs Every 4 (Four) Hours As Needed.      • apixaban (ELIQUIS) 5 MG tablet tablet Take 5 mg by mouth Daily.      • aspirin 81 MG chewable tablet Chew 81 mg Daily.      • atorvastatin (LIPITOR) 40 MG tablet Take 40 mg by mouth Every Night.      • cholecalciferol (VITAMIN D3) 25 MCG (1000 UT) tablet Take 1,000 Units by mouth Daily.      • dextrose (GLUTOSE) 40 % gel Take  by mouth Every 1 (One) Hour As Needed for Low Blood Sugar.      • donepezil (ARICEPT) 10 MG tablet Take 10 mg by mouth Every Night.      • DULoxetine HCl (DRIZALMA) 60 MG capsule Take 60 mg by mouth Daily.      • gabapentin (NEURONTIN) 100 MG capsule Take 2 capsules by mouth 2 (two) times a day. 6 capsule 0    • hydrALAZINE (APRESOLINE) 50 MG tablet Take 50 mg by mouth 2 (two) times a day. Hold for SBP <110      • isosorbide mononitrate (IMDUR) 60 MG 24 hr tablet Take 60 mg by mouth 2 (Two) Times a Day.      • metoprolol succinate XL (TOPROL-XL) 100 MG 24 hr tablet Take 1 tablet by mouth Daily for 30 days. 30 tablet 0    • nitroglycerin (NITROSTAT) 0.4 MG SL tablet Place 1 tablet under the tongue Every 5 (Five) Minutes As Needed for Chest Pain (Only if SBP Greater Than 100). Take no more than 3 doses in 15 minutes. 30 tablet 12    • spironolactone (ALDACTONE) 25 MG tablet Take 1 tablet by mouth Daily for 30 days. 30 tablet 0    • tiotropium (Spiriva HandiHaler) 18 MCG per inhalation capsule Place 1 capsule into inhaler and inhale Daily. 30 capsule 1    •  "vitamin B-12 (CYANOCOBALAMIN) 1000 MCG tablet Take 1,000 mcg by mouth Daily.      • [DISCONTINUED] furosemide (LASIX) 40 MG tablet Take 1 tablet by mouth 2 (Two) Times a Day for 30 days. 60 tablet 0        Allergies  Codeine    Scheduled Meds:amiodarone, 200 mg, Oral, Q12H  apixaban, 5 mg, Oral, Q12H  aspirin, 81 mg, Oral, Daily  atorvastatin, 40 mg, Oral, Nightly  carvedilol, 6.25 mg, Oral, BID With Meals  cholecalciferol, 1,000 Units, Oral, Daily  dilTIAZem CD, 240 mg, Oral, Q24H  donepezil, 10 mg, Oral, Nightly  DULoxetine, 60 mg, Oral, Daily  furosemide, 80 mg, Intravenous, BID  hydrALAZINE, 50 mg, Oral, Q12H  insulin lispro, 0-9 Units, Subcutaneous, TID AC  ipratropium-albuterol, 3 mL, Nebulization, 4x Daily - RT  isosorbide mononitrate, 60 mg, Oral, BID - Nitrates  sodium chloride, 10 mL, Intravenous, Q12H  vitamin B-12, 1,000 mcg, Oral, Daily      Continuous Infusions:   PRN Meds:.•  acetaminophen **OR** acetaminophen **OR** acetaminophen  •  aluminum-magnesium hydroxide-simethicone  •  dextrose  •  dextrose  •  glucagon (human recombinant)  •  insulin lispro **AND** insulin lispro  •  melatonin  •  nitroglycerin  •  ondansetron **OR** ondansetron  •  [COMPLETED] Insert peripheral IV **AND** sodium chloride  •  sodium chloride    Objective     VITAL SIGNS  Vitals:    08/11/21 1737 08/11/21 2211 08/12/21 0159 08/12/21 0555   BP: 126/60 122/62 107/65 116/68   BP Location: Left arm Left arm Left arm Left arm   Patient Position: Lying Lying Lying Lying   Pulse: 74 81 96 93   Resp: 12 20 20 20   Temp: 98.2 °F (36.8 °C) 99 °F (37.2 °C) 97.8 °F (36.6 °C) 97.6 °F (36.4 °C)   TempSrc: Axillary Oral Axillary Oral   SpO2: 98% 90% 95% 96%   Weight:    115 kg (254 lb 10.1 oz)   Height:           Flowsheet Rows      First Filed Value   Admission Height  180.3 cm (71\") Documented at 08/03/2021 1733   Admission Weight  113 kg (250 lb) Documented at 08/03/2021 1733            Intake/Output Summary (Last 24 hours) at 8/12/2021 " 0622  Last data filed at 8/11/2021 2100  Gross per 24 hour   Intake 920 ml   Output 600 ml   Net 320 ml        TELEMETRY: Atrial fibrillation with controlled ventricular response.    Physical Exam:  The patient is alert, oriented and in no distress.  Patient is on BiPAP  Vital signs as noted above.  Exogenous obesity.  Head and neck revealed no carotid bruits or jugular venous distention.  No thyromegaly or lymphadenopathy is present  Lungs clear.  No wheezing.  Breath sounds are normal bilaterally.  Heart normal first and second heart sounds.  No murmur. No precordial rub is present.  No gallop is present.  Abdomen soft and nontender.  No organomegaly is present.  Extremities with good peripheral pulses without any pedal edema.  Skin warm and dry.  Musculoskeletal system is grossly normal  CNS grossly normal      Results Review:   I reviewed the patient's new clinical results.  Lab Results (last 24 hours)     Procedure Component Value Units Date/Time    Potassium [[id number]]  (Normal) Collected: 08/12/21 0432    Specimen: Blood Updated: 08/12/21 0504     Potassium 4.7 mmol/L     Renal Function Panel [[id number]]  (Abnormal) Collected: 08/12/21 0432    Specimen: Blood Updated: 08/12/21 0504     Glucose 161 mg/dL      BUN 77 mg/dL      Creatinine 2.93 mg/dL      Sodium 132 mmol/L      Potassium 4.7 mmol/L      Chloride 89 mmol/L      CO2 33.0 mmol/L      Calcium 8.4 mg/dL      Albumin 3.60 g/dL      Phosphorus 5.3 mg/dL      Anion Gap 10.0 mmol/L      BUN/Creatinine Ratio 26.3     eGFR Non African Amer 21 mL/min/1.73     Narrative:      GFR Normal >60  Chronic Kidney Disease <60  Kidney Failure <15      Potassium [[id number]]  (Normal) Collected: 08/12/21 0048    Specimen: Blood Updated: 08/12/21 0112     Potassium 4.6 mmol/L     Potassium [[id number]]  (Normal) Collected: 08/11/21 2044    Specimen: Blood Updated: 08/11/21 2145     Potassium 5.1 mmol/L     POC Glucose Once [[id number]]  (Abnormal) Collected: 08/11/21  1917    Specimen: Blood Updated: 08/11/21 1918     Glucose 178 mg/dL      Comment: Serial Number: [id number]:  [id number]       POC Glucose Once [[id number]]  (Abnormal) Collected: 08/11/21 1621    Specimen: Blood Updated: 08/11/21 1623     Glucose 193 mg/dL      Comment: Serial Number: [id number]:  [id number]       Potassium [[id number]]  (Normal) Collected: 08/11/21 1253    Specimen: Blood Updated: 08/11/21 1338     Potassium 4.8 mmol/L     POC Glucose Once [[id number]]  (Abnormal) Collected: 08/11/21 1110    Specimen: Blood Updated: 08/11/21 1111     Glucose 218 mg/dL      Comment: Serial Number: [id number]:  [id number]             Imaging Results (Last 24 Hours)     ** No results found for the last 24 hours. **      LAB RESULTS (LAST 7 DAYS)    CBC  Results from last 7 days   Lab Units 08/06/21  0625   WBC 10*3/mm3 5.70   RBC 10*6/mm3 2.74*   HEMOGLOBIN g/dL 8.1*   HEMATOCRIT % 24.0*   MCV fL 87.4   PLATELETS 10*3/mm3 307       BMP  Results from last 7 days   Lab Units 08/12/21  0432 08/12/21  0048 08/11/21  2044 08/11/21  1253 08/11/21  0745 08/10/21  2304 08/10/21  1921 08/10/21  1249 08/10/21  0221 08/07/21  0314 08/07/21  0314 08/06/21  0625 08/06/21  0625   SODIUM mmol/L 132*  --   --   --  136  --   --   --  132*  --  138  --  139   POTASSIUM mmol/L 4.7  4.7 4.6 5.1 4.8 5.4*  5.4* 5.9* 5.5*   < > 5.7*   < > 4.6   < > 4.4   CHLORIDE mmol/L 89*  --   --   --  92*  --   --   --  89*  --  95*  --  96*   CO2 mmol/L 33.0*  --   --   --  34.0*  --   --   --  33.0*  --  33.0*  --  34.0*   BUN mg/dL 77*  --   --   --  72*  --   --   --  71*  --  67*  --  66*   CREATININE mg/dL 2.93*  --   --   --  2.81*  --   --   --  2.67*  --  2.35*  --  2.19*   GLUCOSE mg/dL 161*  --   --   --  183*  --   --   --  101*  --  139*  --  66   PHOSPHORUS mg/dL 5.3*  --   --   --  5.7*  --   --   --   --   --   --   --   --     < > = values in this interval not displayed.       CMP   Results from last 7 days   Lab  Units 08/12/21  0432 08/12/21  0048 08/11/21  2044 08/11/21  1253 08/11/21  0745 08/10/21  2304 08/10/21  1921 08/10/21  1249 08/10/21  0221 08/07/21  0314 08/07/21  0314 08/06/21  0625 08/06/21  0625   SODIUM mmol/L 132*  --   --   --  136  --   --   --  132*  --  138  --  139   POTASSIUM mmol/L 4.7  4.7 4.6 5.1 4.8 5.4*  5.4* 5.9* 5.5*   < > 5.7*   < > 4.6   < > 4.4   CHLORIDE mmol/L 89*  --   --   --  92*  --   --   --  89*  --  95*  --  96*   CO2 mmol/L 33.0*  --   --   --  34.0*  --   --   --  33.0*  --  33.0*  --  34.0*   BUN mg/dL 77*  --   --   --  72*  --   --   --  71*  --  67*  --  66*   CREATININE mg/dL 2.93*  --   --   --  2.81*  --   --   --  2.67*  --  2.35*  --  2.19*   GLUCOSE mg/dL 161*  --   --   --  183*  --   --   --  101*  --  139*  --  66   ALBUMIN g/dL 3.60  --   --   --  3.70  --   --   --   --   --   --   --   --     < > = values in this interval not displayed.         BNP        TROPONIN        CoAg        Creatinine Clearance  Estimated Creatinine Clearance: 28.6 mL/min (A) (by C-G formula based on SCr of 2.93 mg/dL (H)).    ABG  Results from last 7 days   Lab Units 08/11/21  0833 08/10/21  2240 08/10/21  2122   PH, ARTERIAL pH units 7.361 7.342* 7.295*   PCO2, ARTERIAL mm Hg 55.7* 66.1* 74.1*   PO2 ART mm Hg 42.8* 108.5* 93.3   O2 SATURATION ART % 75.2* 97.6 95.8   BASE EXCESS ART mmol/L 5.4* 8.8* 8.0*       Radiology  XR Chest 1 View    Result Date: 8/11/2021   1. Increasing interstitial and alveolar disease in both lungs compared to 8/3/2021 may represent worsening pulmonary edema. 2. Small bilateral pleural effusions, left greater than right, are thought to be similar to the prior exam. 3. Stable cardiomegaly.  Electronically Signed By-Demetria Jolley MD On:8/11/2021 8:29 AM This report was finalized on [id number] by  Demetria Jolley MD.              EKG              I personally viewed and interpreted the patient's EKG/Telemetry data: Atrial  fibrillation  ECHOCARDIOGRAM:    Results for orders placed during the hospital encounter of 07/20/21    Adult Transthoracic Echo Complete With Contrast if Necessary Per Protocol    Interpretation Summary  · Estimated left ventricular EF = 30% Left ventricular systolic function is moderately decreased.    Occasions  Shortness of breath.    Technically satisfactory study.  Mitral valve is structurally normal.  Mild mitral regurgitation.  Tricuspid valve is structurally normal.  Aortic valve is structurally normal.  Pulmonic valve could not be well visualized.  No evidence for tricuspid or aortic regurgitation is seen by Doppler study.  Biatrial and biventricular enlargement is present.  Diffuse left ventricular hypocontractility with ejection fraction of 30%.  Atrial septum is intact.  Aorta is normal.  No pericardial effusion or intracardiac thrombus is seen.    Impression  Mild mitral regurgitation.  Significant biatrial and biventricular enlargement.  Diffuse left ventricular hypocontractility with ejection fraction of 30%.  No pericardial effusion or intracardiac thrombus is seen.          STRESS MYOVIEW:    Cardiolite (Tc-99m Sestamibi) stress test    CARDIAC CATHETERIZATION:            OTHER:         Assessment/Plan     Principal Problem:    Atrial fibrillation with RVR (CMS/HCC)  Active Problems:    S/P CABG (coronary artery bypass graft)    Essential hypertension    Elevated troponin    ANNETTE treated with BiPAP    Mixed hyperlipidemia    Flaccid hemiplegia of right dominant side as late effect of cerebral infarction (CMS/HCC)    Diabetic neuropathy (CMS/HCC)    Unspecified dementia with behavioral disturbance (CMS/HCC)    Coronary artery disease    Chronic venous hypertension (idiopathic) with ulcer and inflammation of bilateral lower extremity (CMS/HCC)    Chronic obstructive pulmonary disease, unspecified (CMS/HCC)    Chronic foot ulcer (CMS/HCC)    CKD (chronic kidney disease) stage 3, GFR 30-59 ml/min  (CMS/[facility])    Tobacco use disorder    Acute on chronic systolic heart failure (CMS/[facility])    Anemia of renal disease      [[[[[[[[[[[[[[[[[[[[[[[[[    Impression  ==============  -Increased shortness of breath and palpitations     -Recent acute on chronic congestive heart failure.  Compensated at this time  Recent echocardiogram showed left ventricle dysfunction with ejection fraction of 35%.     -History of right-sided weakness with left MCA territory stroke.-Improved      -status post CABG 2005. status post stent to LAD 2009    Status post stent to LAD 11/13/2015  Status post stent to mid LAD and SVG to marginal branch 09/16/2016.  Status post stent to mid LAD 5/24/2021  Status post stent to SVG to RCA 5/26/2021     Cardiac catheterization 5/24/2021 revealed  Left ventricle angiogram was not performed due to pre-existing renal dysfunction.  Left main coronary artery normal.  Left anterior descending artery has mid segment lengthy 90% disease within the previously placed stent.  Distal left into descending artery has diffuse disease.  Circumflex coronary artery is totally occluded.  (Chronic)  Right coronary artery is chronically occluded.  SVG to marginal branch (jump graft to OM1 and OM 2) is totally occluded (new finding compared to 2015.  SVG to PDA has distal radiolucency.  However no definite obstructive disease is present.       -status post myocardial infarction 2000 and 2002 .      -history of intermittent atrial fibrillation-maintaining sinus rhythm     Transesophageal echocardiogram 11/30/2020.  Biatrial enlargement.  Smoking effect in the left atrium and left ventricle and left atrial appendage.  Mild mitral and aortic regurgitation.  Left ventricle enlargement with diffuse hypocontractility with ejection fraction of 35 to 40%.     Echocardiogram 11/27/2020 revealed left atrial enlargement left ventricle dysfunction with ejection fraction of 40%.  Negative bubble study.      -diabetes hypertension and  sleep apnea in history of renal dysfunction .  Recent BUN/creatinine 38/1.36     -status post colon surgery (partial colectomy) appendectomy cholecystectomy right 4th toe removal and carpal tunnel surgery      -continued smoking 1 pack per day -abstinence from smoking      -allergy to codeine.  ============   Plan  ================  Atrial fibrillation-chronic  Patient is off intravenous Cardizem.  Precordial has improved.  Continue Coreg and p.o. Cardizem and amiodarone 200 mg once a day.  Observe for any further episodes of bradycardia.    Troponin level is slightly elevated  0.11  0.41  No bump up in troponin.    Significant renal dysfunction-chronic  BUN/68/1.91-8/4/2021.  BUN 64 creatinine 1.95-8/2/2021  68/2.04-8/5/2021  67/2.35-8/9/2021  71/2.67-8/10/2021  77/2.93-8/12/2021    Hyperkalemia  k 5.7  Patient is off spironolactone.  Patient is not on any potassium supplements.  Potassium is better at 4.7-8/12/2021  Renal follow-up in progress.    Anemia  hgb 7.1  7-8/5/2021  7 PRBC  Receiving iron transfusion.  8.1-8/9/2021     Status post CABG  Patient is not having any angina pectoris  Status post stent to LAD 5/24/2021.  Status post stent to SVG to RCA 5/26/2021.       Anticoagulation  Patient is on Eliquis.    Medications were reviewed and updated.  Current medications include amiodarone 200 mg a day Eliquis aspirin atorvastatin Coreg Cardizem CD Lasix gabapentin hydralazine Imdur.    Further plan will depend on patient's progress.   ]]]]]]]]]]]]]]]]]]]]]]           Jose G Rm MD  08/12/21  06:22 EDT

## 2021-08-12 NOTE — PLAN OF CARE
Goal Outcome Evaluation:   Patient has required BiPAP most of the shift. VS stable at this time. No c/o pain or discomfort. Will continue to monitor.   Problem: Arrhythmia/Dysrhythmia  Goal: Normalized Cardiac Rhythm  Outcome: Ongoing, Progressing  Intervention: Monitor and Manage Cardiac Rhythm Effects  Recent Flowsheet Documentation  Taken 8/11/2021 2005 by Fatoumata Bangura RN  VTE Prevention/Management: (eliquis) other (see comments)     Problem: Fall Injury Risk  Goal: Absence of Fall and Fall-Related Injury  Outcome: Ongoing, Progressing  Intervention: Identify and Manage Contributors to Fall Injury Risk  Recent Flowsheet Documentation  Taken 8/12/2021 0155 by Fatoumata Bangura RN  Medication Review/Management: medications reviewed  Taken 8/12/2021 0005 by Fatoumata Bangura RN  Medication Review/Management: medications reviewed  Taken 8/11/2021 2200 by Fatoumata Bangura RN  Medication Review/Management: medications reviewed  Taken 8/11/2021 2005 by Fatoumata Bangura RN  Medication Review/Management: medications reviewed  Intervention: Promote Injury-Free Environment  Recent Flowsheet Documentation  Taken 8/12/2021 0155 by Fatoumata Bangura RN  Safety Promotion/Fall Prevention:   safety round/check completed   room organization consistent   fall prevention program maintained   assistive device/personal items within reach   clutter free environment maintained   activity supervised  Taken 8/12/2021 0005 by Fatoumata Bangura RN  Safety Promotion/Fall Prevention:   safety round/check completed   room organization consistent   fall prevention program maintained   clutter free environment maintained   assistive device/personal items within reach  Taken 8/11/2021 2200 by Fatoumata Bangura RN  Safety Promotion/Fall Prevention:   safety round/check completed   room organization consistent   fall prevention program maintained   clutter free environment maintained   assistive device/personal items within reach   activity supervised  Taken 8/11/2021 2005  by Willem, Fatoumata, RN  Safety Promotion/Fall Prevention:   activity supervised   assistive device/personal items within reach   clutter free environment maintained   fall prevention program maintained   safety round/check completed   room organization consistent     Problem: Adult Inpatient Plan of Care  Goal: Plan of Care Review  Outcome: Ongoing, Progressing  Goal: Patient-Specific Goal (Individualized)  Outcome: Ongoing, Progressing  Goal: Absence of Hospital-Acquired Illness or Injury  Outcome: Ongoing, Progressing  Intervention: Identify and Manage Fall Risk  Recent Flowsheet Documentation  Taken 8/12/2021 0155 by Fatoumata Bangura RN  Safety Promotion/Fall Prevention:   safety round/check completed   room organization consistent   fall prevention program maintained   assistive device/personal items within reach   clutter free environment maintained   activity supervised  Taken 8/12/2021 0005 by Fatoumata Bangura RN  Safety Promotion/Fall Prevention:   safety round/check completed   room organization consistent   fall prevention program maintained   clutter free environment maintained   assistive device/personal items within reach  Taken 8/11/2021 2200 by Fatoumata Bangura RN  Safety Promotion/Fall Prevention:   safety round/check completed   room organization consistent   fall prevention program maintained   clutter free environment maintained   assistive device/personal items within reach   activity supervised  Taken 8/11/2021 2005 by Fatoumata Bangura RN  Safety Promotion/Fall Prevention:   activity supervised   assistive device/personal items within reach   clutter free environment maintained   fall prevention program maintained   safety round/check completed   room organization consistent  Intervention: Prevent Skin Injury  Recent Flowsheet Documentation  Taken 8/12/2021 0005 by Fatoumata Bangura RN  Skin Protection:   adhesive use limited   incontinence pads utilized  Taken 8/11/2021 2005 by Fatoumata Bangura RN  Body Position:  supine  Skin Protection:   adhesive use limited   incontinence pads utilized  Intervention: Prevent and Manage VTE (venous thromboembolism) Risk  Recent Flowsheet Documentation  Taken 8/11/2021 2005 by Fatoumata Bangura RN  VTE Prevention/Management: (eliquis) other (see comments)  Intervention: Prevent Infection  Recent Flowsheet Documentation  Taken 8/12/2021 0155 by Fatoumata Bangura RN  Infection Prevention:   hand hygiene promoted   personal protective equipment utilized   rest/sleep promoted  Taken 8/12/2021 0005 by Fatoumata Bangura RN  Infection Prevention:   hand hygiene promoted   personal protective equipment utilized   rest/sleep promoted  Taken 8/11/2021 2200 by Fatoumata Bangura RN  Infection Prevention:   hand hygiene promoted   personal protective equipment utilized   rest/sleep promoted  Taken 8/11/2021 2005 by Fatoumata Bangura RN  Infection Prevention:   rest/sleep promoted   personal protective equipment utilized   hand hygiene promoted  Goal: Optimal Comfort and Wellbeing  Outcome: Ongoing, Progressing  Intervention: Provide Person-Centered Care  Recent Flowsheet Documentation  Taken 8/12/2021 0005 by Fatoumata Bangura RN  Trust Relationship/Rapport: care explained  Taken 8/11/2021 2005 by Fatoumata Bangura RN  Trust Relationship/Rapport:   care explained   questions answered  Goal: Readiness for Transition of Care  Outcome: Ongoing, Progressing     Problem: Skin Injury Risk Increased  Goal: Skin Health and Integrity  Outcome: Ongoing, Progressing  Intervention: Optimize Skin Protection  Recent Flowsheet Documentation  Taken 8/12/2021 0005 by Fatoumata Bangura RN  Pressure Reduction Techniques:   frequent weight shift encouraged   weight shift assistance provided  Pressure Reduction Devices: pressure-redistributing mattress utilized  Skin Protection:   adhesive use limited   incontinence pads utilized  Taken 8/11/2021 2005 by Fatoumata Bangura RN  Pressure Reduction Techniques:   frequent weight shift encouraged   weight shift  assistance provided  Head of Bed (HOB): [facility] elevated  Pressure Reduction Devices: pressure-redistributing mattress utilized  Skin Protection:   adhesive use limited   incontinence pads utilized

## 2021-08-12 NOTE — PLAN OF CARE
Problem: Arrhythmia/Dysrhythmia  Goal: Normalized Cardiac Rhythm  Outcome: Ongoing, Progressing     Problem: Fall Injury Risk  Goal: Absence of Fall and Fall-Related Injury  Outcome: Ongoing, Progressing  Intervention: Identify and Manage Contributors to Fall Injury Risk  Recent Flowsheet Documentation  Taken 8/12/2021 1602 by Sahra Carpenter RN  Medication Review/Management: medications reviewed  Taken 8/12/2021 1400 by Sahra Carpenter RN  Medication Review/Management: medications reviewed  Taken 8/12/2021 1310 by Sahra Carpenter RN  Medication Review/Management: medications reviewed  Taken 8/12/2021 1200 by Sahra Carpenter RN  Medication Review/Management: medications reviewed  Taken 8/12/2021 1000 by Sahra Carpenter RN  Medication Review/Management: medications reviewed  Taken 8/12/2021 0948 by Sahra Carpenter RN  Medication Review/Management: medications reviewed  Taken 8/12/2021 0800 by Sahra Carpenter RN  Medication Review/Management: medications reviewed  Intervention: Promote Injury-Free Environment  Recent Flowsheet Documentation  Taken 8/12/2021 1602 by Sahra Carpenter RN  Safety Promotion/Fall Prevention:   safety round/check completed   fall prevention program maintained  Taken 8/12/2021 1400 by aShra Carpenter RN  Safety Promotion/Fall Prevention:   safety round/check completed   fall prevention program maintained  Taken 8/12/2021 1310 by Sahra Carpenter, RN  Safety Promotion/Fall Prevention:   safety round/check completed   fall prevention program maintained  Taken 8/12/2021 1200 by Sahra Carpenter RN  Safety Promotion/Fall Prevention:   safety round/check completed   fall prevention program maintained  Taken 8/12/2021 1000 by Sahra Caprenter, RN  Safety Promotion/Fall Prevention:   safety round/check completed   fall prevention program maintained  Taken 8/12/2021 0948 by Sahra Carpenter, RN  Safety Promotion/Fall  Prevention:   safety round/check completed   fall prevention program maintained  Taken 8/12/2021 0800 by Sahra Carpenter RN  Safety Promotion/Fall Prevention:   safety round/check completed   fall prevention program maintained     Problem: Adult Inpatient Plan of Care  Goal: Plan of Care Review  Outcome: Ongoing, Progressing  Goal: Patient-Specific Goal (Individualized)  Outcome: Ongoing, Progressing  Goal: Absence of Hospital-Acquired Illness or Injury  Outcome: Ongoing, Progressing  Intervention: Identify and Manage Fall Risk  Recent Flowsheet Documentation  Taken 8/12/2021 1602 by Sahra Carpenter RN  Safety Promotion/Fall Prevention:   safety round/check completed   fall prevention program maintained  Taken 8/12/2021 1400 by Sahra Carpenter RN  Safety Promotion/Fall Prevention:   safety round/check completed   fall prevention program maintained  Taken 8/12/2021 1310 by Sahra Carpenter RN  Safety Promotion/Fall Prevention:   safety round/check completed   fall prevention program maintained  Taken 8/12/2021 1200 by Sahra Carpenter RN  Safety Promotion/Fall Prevention:   safety round/check completed   fall prevention program maintained  Taken 8/12/2021 1000 by Sahra Carepnter RN  Safety Promotion/Fall Prevention:   safety round/check completed   fall prevention program maintained  Taken 8/12/2021 0948 by Sahra Carpenter RN  Safety Promotion/Fall Prevention:   safety round/check completed   fall prevention program maintained  Taken 8/12/2021 0800 by Sahra Carpenter RN  Safety Promotion/Fall Prevention:   safety round/check completed   fall prevention program maintained  Intervention: Prevent Skin Injury  Recent Flowsheet Documentation  Taken 8/12/2021 1602 by Sahra Carpenter RN  Skin Protection: incontinence pads utilized  Taken 8/12/2021 0948 by Sahra Carpenter RN  Skin Protection: incontinence pads utilized  Goal: Optimal Comfort and  Wellbeing  Outcome: Ongoing, Progressing  Intervention: Provide Person-Centered Care  Recent Flowsheet Documentation  Taken 8/12/2021 1200 by Sahra Carpenter RN  Trust Relationship/Rapport:   care explained   choices provided   emotional support provided  Taken 8/12/2021 0948 by Sahra Carpenter RN  Trust Relationship/Rapport:   choices provided   care explained   emotional support provided   empathic listening provided  Goal: Readiness for Transition of Care  Outcome: Ongoing, Progressing     Problem: Skin Injury Risk Increased  Goal: Skin Health and Integrity  Outcome: Ongoing, Progressing  Intervention: Optimize Skin Protection  Recent Flowsheet Documentation  Taken 8/12/2021 1602 by Sahra Carpenter RN  Pressure Reduction Techniques: frequent weight shift encouraged  Skin Protection: incontinence pads utilized  Taken 8/12/2021 0948 by Sahra Carpenter RN  Pressure Reduction Techniques: weight shift assistance provided  Pressure Reduction Devices: pressure-redistributing mattress utilized  Skin Protection: incontinence pads utilized   Goal Outcome Evaluation:

## 2021-08-12 NOTE — PROGRESS NOTES
: The chart reviewed.  Patient is still have mild degree of dyspnea    Vital Signs  Vitals:    08/12/21 1224   BP:    Pulse: 79   Resp: 19   Temp:    SpO2: 100%     I/O this shift:  In: 222 [P.O.:222]  Out: 100 [Urine:100]  I/O last 3 completed shifts:  In: 1220 [P.O.:1220]  Out: 600 [Urine:600]      Physical Exam:    General: In no acute distress  HEENT:  Normocephalic, Atraumatic, EOMI, PERRLA, NO icterus,  Neck:  Supple, No JVD, No Carotid artery bruit, No lymphadenopathy  Chest:  crackles at bases and decreased breath sound  Cardiovascular: Regular rate and rhythm. Positive S1 & S2, No rub, murmur or gallop.  Abdominal: Soft, nontender, no palpable organomegaly, no abdominal bruit, positive bowel sounds  Musculoskeletal: No joint tenderness or swelling, good range of motion, Adequate muscle strength on both sides, no cyanosis, clubbing or edema on lower extremities.  Neuro: Alert oriented x3, CN1 - 12 intact, No focal sensory or motor deficit.      Review of Systems:   CNS: Denied headaches, blurred vision, tingling or numbness in any part of body. No weakness or any impaired speech.  CVS: No chest pain or shortness of breath, No orthopnea or PND or exertional dyspnea,  Pulmonary: Denies shortness of breath, coughs, hematemesis, night sweats or weight loss.  GI: Denies diarrhea, nausea, vomiting. Denies weight loss, hematemesis, melena.  : Denies dysuria, frequency or hesitancy of urination  Vascular: Denies claudication, resting pain, tingling numbness or weakness in any part of the body.  Musculoskeletal: Denies Joint tenderness or pain, denies stiffness in joints, denies fever or weight loss, or skin rashes.    Current Labs  [unfilled]  Lab Results (last 24 hours)     Procedure Component Value Units Date/Time    POC Glucose Once [[id number]]  (Abnormal) Collected: 08/12/21 1107    Specimen: Blood Updated: 08/12/21 1108     Glucose 230 mg/dL      Comment: Serial Number: [id number]:  [id number]        POC Glucose Once [[id number]]  (Abnormal) Collected: 08/12/21 0745    Specimen: Blood Updated: 08/12/21 0746     Glucose 179 mg/dL      Comment: Serial Number: [id number]:  [id number]       Potassium [[id number]]  (Normal) Collected: 08/12/21 0432    Specimen: Blood Updated: 08/12/21 0504     Potassium 4.7 mmol/L     Renal Function Panel [[id number]]  (Abnormal) Collected: 08/12/21 0432    Specimen: Blood Updated: 08/12/21 0504     Glucose 161 mg/dL      BUN 77 mg/dL      Creatinine 2.93 mg/dL      Sodium 132 mmol/L      Potassium 4.7 mmol/L      Chloride 89 mmol/L      CO2 33.0 mmol/L      Calcium 8.4 mg/dL      Albumin 3.60 g/dL      Phosphorus 5.3 mg/dL      Anion Gap 10.0 mmol/L      BUN/Creatinine Ratio 26.3     eGFR Non African Amer 21 mL/min/1.73     Narrative:      GFR Normal >60  Chronic Kidney Disease <60  Kidney Failure <15      Potassium [[id number]]  (Normal) Collected: 08/12/21 0048    Specimen: Blood Updated: 08/12/21 0112     Potassium 4.6 mmol/L     Potassium [[id number]]  (Normal) Collected: 08/11/21 2044    Specimen: Blood Updated: 08/11/21 2145     Potassium 5.1 mmol/L     POC Glucose Once [[id number]]  (Abnormal) Collected: 08/11/21 1917    Specimen: Blood Updated: 08/11/21 1918     Glucose 178 mg/dL      Comment: Serial Number: [id number]:  [id number]           Current Radiology  Imaging Results (Last 24 Hours)     ** No results found for the last 24 hours. **        Current Meds    Current Facility-Administered Medications:   •  acetaminophen (TYLENOL) tablet 650 mg, 650 mg, Oral, Q4H PRN, 650 mg at 08/09/21 1713 **OR** acetaminophen (TYLENOL) 160 MG/5ML solution 650 mg, 650 mg, Oral, Q4H PRN **OR** acetaminophen (TYLENOL) suppository 650 mg, 650 mg, Rectal, Q4H PRN, Rayne Bah, FABIOLA  •  aluminum-magnesium hydroxide-simethicone (MAALOX MAX) 400-400-40 MG/5ML suspension 15 mL, 15 mL, Oral, Q6H PRN, Rayne Bah APRN  •  amiodarone (PACERONE) tablet 200 mg, 200 mg, Oral, Q12H,  Jose G Rm MD, 200 mg at 08/12/21 0942  •  apixaban (ELIQUIS) tablet 5 mg, 5 mg, Oral, Q12H, Jose G Rm MD, 5 mg at 08/12/21 0942  •  aspirin chewable tablet 81 mg, 81 mg, Oral, Daily, Rayne Bah APRN, 81 mg at 08/12/21 0942  •  atorvastatin (LIPITOR) tablet 40 mg, 40 mg, Oral, Nightly, Rayne Bah APRN, 40 mg at 08/11/21 2023  •  carvedilol (COREG) tablet 6.25 mg, 6.25 mg, Oral, BID With Meals, Jose G Rm MD, 6.25 mg at 08/12/21 0942  •  cholecalciferol (VITAMIN D3) tablet 1,000 Units, 1,000 Units, Oral, Daily, Rayne Bah APRN, 1,000 Units at 08/12/21 0942  •  dextrose (D50W) 25 g/ 50mL Intravenous Solution 25 g, 25 g, Intravenous, Q15 Min PRN, Stanton Junior MD  •  dextrose (GLUTOSE) oral gel 15 g, 15 g, Oral, Q15 Min PRN, Stanton Junior MD  •  dilTIAZem CD (CARDIZEM CD) 24 hr capsule 240 mg, 240 mg, Oral, Q24H, Jose G Rm MD, 240 mg at 08/12/21 0942  •  donepezil (ARICEPT) tablet 10 mg, 10 mg, Oral, Nightly, Rayne Bah APRN, 10 mg at 08/11/21 2023  •  DULoxetine (CYMBALTA) DR capsule 60 mg, 60 mg, Oral, Daily, Rayne Bah APRN, 60 mg at 08/12/21 0942  •  furosemide (LASIX) injection 80 mg, 80 mg, Intravenous, BID, Yony Bhat MD, 80 mg at 08/12/21 0944  •  glucagon (human recombinant) (GLUCAGEN DIAGNOSTIC) injection 1 mg, 1 mg, Subcutaneous, Q15 Min PRN, Stanton uJnior MD  •  hydrALAZINE (APRESOLINE) tablet 50 mg, 50 mg, Oral, Q12H, Rayne Bah APRN, 50 mg at 08/10/21 2028  •  insulin lispro (ADMELOG) injection 0-9 Units, 0-9 Units, Subcutaneous, TID AC, 2 Units at 08/11/21 1756 **AND** insulin lispro (ADMELOG) injection 0-9 Units, 0-9 Units, Subcutaneous, PRN, Stanton Junior MD, 2 Units at 08/11/21 2024  •  ipratropium-albuterol (DUO-NEB) nebulizer solution 3 mL, 3 mL, Nebulization, 4x Daily - RT, Rayne Bah APRN, 3 mL at 08/12/21 1220  •  isosorbide mononitrate (IMDUR) 24 hr tablet 60 mg, 60 mg, Oral, BID - Nitrates, Rayne Bah APRN, 60 mg at  08/12/21 0942  •  melatonin tablet 5 mg, 5 mg, Oral, Nightly PRN, Rayne Bah APRN  •  nitroglycerin (NITROSTAT) SL tablet 0.4 mg, 0.4 mg, Sublingual, Q5 Min PRN, Rayne Bah APRN  •  ondansetron (ZOFRAN) tablet 4 mg, 4 mg, Oral, Q6H PRN **OR** ondansetron (ZOFRAN) injection 4 mg, 4 mg, Intravenous, Q6H PRN, Rayne Bah APRN  •  [COMPLETED] Insert peripheral IV, , , Once **AND** sodium chloride 0.9 % flush 10 mL, 10 mL, Intravenous, PRN, Davsi Zavala MD  •  sodium chloride 0.9 % flush 10 mL, 10 mL, Intravenous, Q12H, Rayne Bah APRN, 10 mL at 08/12/21 0944  •  sodium chloride 0.9 % flush 10 mL, 10 mL, Intravenous, PRN, Rayne Bah APRN  •  vitamin B-12 (CYANOCOBALAMIN) tablet 1,000 mcg, 1,000 mcg, Oral, Daily, Rayne Bah APRN, 1,000 mcg at 08/12/21 0942              Patient Active Problem List   Diagnosis   • Diabetes mellitus due to underlying condition without complication, without long-term current use of insulin (CMS/[facility])   • S/P CABG (coronary artery bypass graft)   • Essential hypertension   • Dyslipidemia   • Fall   • Elevated troponin   • Closed fracture of seventh thoracic vertebra (CMS/[facility])   • Cerebrovascular accident (CVA) (CMS/[facility])   • Other headache syndrome   • Right hand weakness   • Neurologic gait dysfunction   • Tobacco user   • Status post coronary artery stent placement   • ANNETTE treated with BiPAP   • Right hemiplegia (CMS/[facility])   • Major depressive disorder, recurrent, mild (CMS/[facility])   • Immobility syndrome   • Lymphadenopathy, mediastinal   • Mixed hyperlipidemia   • History of cerebrovascular accident   • History of amputation of lesser toe (CMS/[facility])   • Flaccid hemiplegia of right dominant side as late effect of cerebral infarction (CMS/[facility])   • Diabetic neuropathy (CMS/[facility])   • Depressive disorder   • Unspecified dementia with behavioral disturbance (CMS/[facility])   • COVID-19   • Coronary artery disease   • Constipation   • Obesity   • Cognitive communication deficit    • Vitamin D deficiency   • Chronic venous hypertension (idiopathic) with ulcer and inflammation of bilateral lower extremity (CMS/HCC)   • Chronic obstructive pulmonary disease, unspecified (CMS/HCC)   • Chronic foot ulcer (CMS/HCC)   • Chronic left-sided low back pain without sciatica   • Acute congestive heart failure (CMS/HCC)   • Paroxysmal atrial fibrillation (CMS/HCC)   • Arthritis   • Acute renal insufficiency   • Atrial fibrillation with rapid ventricular response (CMS/HCC)   • Type 2 diabetes mellitus with hyperglycemia (CMS/HCC)   • Abnormal nuclear stress test   • Cytokine release syndrome, grade 1   • Unstable angina (CMS/HCC)   • Acute respiratory distress   • CKD (chronic kidney disease) stage 3, GFR 30-59 ml/min (CMS/HCC)   • Tobacco use disorder   • Acute on chronic systolic heart failure (CMS/HCC)   • Nonsustained ventricular tachycardia (CMS/HCC)   • Atrial fibrillation with RVR (CMS/HCC)   • Tachycardia   • A-fib (CMS/HCC)   • Anemia of renal disease   Patient with acute on chronic kidney injury with decompensated heart failure continue diuretics optimize LV function avoid all nephrotoxic agents monitor renal function and electrolytes        Katerina Beyer MD FACP, FASN.  08/12/21  12:45 EDT

## 2021-08-12 NOTE — PROGRESS NOTES
[facility] Medicine Services Daily Progress Note    Patient Name: Benson Mclean  : 1950  MRN: [id number]  Primary Care Physician:  Samantha Zabala APRN  Date of admission: 8/3/2021      Subjective      Chief Complaint: Palpitations      Patient Reports Benson Mclean is a 70 y.o. male w/PMH of CHF, CKD, DM, HTN, HLD, CAD, COPD, A. fib, CABG, ANNETTE, CVA, neuropathy, obesity, depression, COVID-19 who presents to [facility] ED with palpitations, patient was discharged from this facility yesterday after a 12-day admission.  Patient states tachycardia progressively worsened after discharge from this facility yesterday, no aggravating or alleviating factors noted.  Patient admits to chest pain, nausea, weakness, palpitations, edema, dyspnea.  Patient denies fever, chills, nausea, vomiting.  As tachycardia did not improve he decided to go to [facility] ED.        Upon arrival to the ED vitals temp 97, HR 93, RR 22, /75, O2 sat 97% on 4 L nasal cannula.  Labs notable for troponin 0.117, proBNP 7648, glucose 198, , K4.5, CO2 32, creatinine 1.91, BUN 68, GFR 35, WBC 7.4, Hgb 8.0, platelets 334, neutrophils 65.9.  EKG shows tachycardia rate 126 repolarization abnormality suggest ischemia diffuse leads.  Chest x-ray shows stable cardiomegaly with pulmonary interstitial edema pattern trace bilateral pleural effusions.  Patient treated in the ED with 10 mg IV Cardizem, IV Cardizem drip initiated.    Readmitted from [facility] after having been discharged 24 hours ago. 21, 3:42 PM EDT     Patient much improved.  Heart rate controlled.  No significant complaints 21, 8:38 PM EDT    After I made the discharge yesterday I was told patient needs a pre-CERT.  Hence discharge canceled 21, 5:06 PM EDT    No new complaints. 21, 2:39 PM EDT    21:HR has been stable and he has no new complaints. No chest pain.     8/10/21:feeling  stable this am however nurse reports episode of bradycardia last night where patient felt dizzy. No chest pain.     8/11/2021: Apparently last night patient had some worsening shortness of breath oxygen had to be turned up.  Had to wear the CPAP.  Reports breathing is stable today    8/12/2021: Oxygen requirement still on 8 L today and having some slight shortness of breath.  No chest pain.    Review of Systems   Cardiovascular: Negative for chest pain and palpitations.            Objective      Vitals:   Temp:  [97.6 °F (36.4 °C)-99 °F (37.2 °C)] 97.6 °F (36.4 °C)  Heart Rate:  [] 79  Resp:  [12-22] 19  BP: (107-126)/(60-68) 119/65  Flow (L/min):  [8-10] 8    Physical Exam  Vitals reviewed.   HENT:      Head: Normocephalic.   Eyes:      Pupils: Pupils are equal, round, and reactive to light.   Cardiovascular:      Rate and Rhythm: Rhythm irregular.   Pulmonary:      Comments: Diminished at the bases  Abdominal:      General: There is no distension.      Palpations: Abdomen is soft.   Musculoskeletal:      Right lower leg: Edema present.      Left lower leg: Edema present.   Neurological:      Mental Status: He is alert. Mental status is at baseline.   Psychiatric:         Mood and Affect: Mood normal.        Noted      Result Review    Result Review:  I have personally reviewed the results from the time of this admission to 8/12/2021 13:52 EDT and agree with these findings:  [x]  Laboratory  []  Microbiology  [x]  Radiology  []  EKG/Telemetry   []  Cardiology/Vascular   []  Pathology  [x]  Old records  []  Other:             Assessment/Plan      Brief Patient Summary:  Benson Mclean is a 70 y.o. male who       amiodarone, 200 mg, Oral, Q12H  apixaban, 5 mg, Oral, Q12H  aspirin, 81 mg, Oral, Daily  atorvastatin, 40 mg, Oral, Nightly  carvedilol, 6.25 mg, Oral, BID With Meals  cholecalciferol, 1,000 Units, Oral, Daily  dilTIAZem CD, 240 mg, Oral, Q24H  donepezil, 10 mg, Oral, Nightly  DULoxetine, 60 mg,  Oral, Daily  furosemide, 80 mg, Intravenous, BID  hydrALAZINE, 50 mg, Oral, Q12H  insulin lispro, 0-9 Units, Subcutaneous, TID AC  ipratropium-albuterol, 3 mL, Nebulization, 4x Daily - RT  isosorbide mononitrate, 60 mg, Oral, BID - Nitrates  metOLazone, 5 mg, Oral, Daily  sodium chloride, 10 mL, Intravenous, Q12H  vitamin B-12, 1,000 mcg, Oral, Daily             Active Hospital Problems:  Active Hospital Problems    Diagnosis    • **Atrial fibrillation with RVR (CMS/[facility])    • Anemia of renal disease    • Acute on chronic systolic heart failure (CMS/[facility])    • Tobacco use disorder    • CKD (chronic kidney disease) stage 3, GFR 30-59 ml/min (CMS/[facility])    • Mixed hyperlipidemia    • Chronic obstructive pulmonary disease, unspecified (CMS/[facility])    • Flaccid hemiplegia of right dominant side as late effect of cerebral infarction (CMS/[facility])    • Unspecified dementia with behavioral disturbance (CMS/[facility])    • Elevated troponin    • Essential hypertension    • S/P CABG (coronary artery bypass graft)    • Chronic venous hypertension (idiopathic) with ulcer and inflammation of bilateral lower extremity (CMS/[facility])    • Chronic foot ulcer (CMS/[facility])    • Coronary artery disease    • ANNETTE treated with BiPAP    • Diabetic neuropathy (CMS/[facility])      Plan:     Atrial fibrillation:   -on cardizem, amiodarone and Eliquis  -Heart rate seems stable now  -cardiology following    DILEEP on CKD IV  -Cr rising again 2.9  -nephrology following and managing diuresis  -Currently on IV Lasix 80mg twice daily  -BMP in am    Acute on chronic hypercapnic/hypoxic respiratory failure  ANNETTE with BiPAP:  -now using BIPAP hs and prn while here  -On 8 L  -Hopefully will improve with diuresis  -pulmonary following    Hyperkalemia  -Improved and K is now 4.7  -treated per protocol   -aldactone stopped and may need to d/c of K remains high normal     Acute on chronic HFrEF  -compensated at this time  -Diuresis as noted above  -Metolazone added by nephrology  -cannot  use ACE/ARB d/t Cr above    Essential hypertension:  -Continue home medication hydralazine and imdur     Dyslipidemia:  -Continue home medication atorvastatin 40 mg p.o. nightly     Obesity:  -encourage dietary modifications     Coronary artery disease/history of stent/history of CABG  -Continue ASA  And imdur     COPD:  -no exacerbation        History of CVA/right hemiaplasia:  -CVA in November 2020     Diabetic neuropathy:  Depression:  Unspecified dementia:  -chronic conditions  -Stopped gabapentin as it was causing some upper extremity tremor with worsening renal failure    DVT prophylaxis:  Medical DVT prophylaxis orders are present.    CODE STATUS:    Code Status: CPR  Medical Interventions (Level of Support Prior to Arrest): Full      Disposition:  To rehab when cleared by specialists    This patient has been examined wearing appropriate Personal Protective [id number]      Electronically signed by Jose Guadalupe Corea DO, 08/12/21, 13:52 EDT.  [facility] Hospitalist Team

## 2021-08-12 NOTE — PROGRESS NOTES
Daily Progress Note        Atrial fibrillation with RVR (CMS/[facility])    S/P CABG (coronary artery bypass graft)    Essential hypertension    Elevated troponin    ANNETTE treated with BiPAP    Mixed hyperlipidemia    Flaccid hemiplegia of right dominant side as late effect of cerebral infarction (CMS/[facility])    Diabetic neuropathy (CMS/[facility])    Unspecified dementia with behavioral disturbance (CMS/[facility])    Coronary artery disease    Chronic venous hypertension (idiopathic) with ulcer and inflammation of bilateral lower extremity (CMS/[facility])    Chronic obstructive pulmonary disease, unspecified (CMS/[facility])    Chronic foot ulcer (CMS/[facility])    CKD (chronic kidney disease) stage 3, GFR 30-59 ml/min (CMS/[facility])    Tobacco use disorder    Acute on chronic systolic heart failure (CMS/[facility])    Anemia of renal disease    Assessment:    Dyspnea    Pulmonary edema with bilateral pleural effusions and increased vascular markings  ABG 8/10/2021, 7.3 4/66/108/35 on BiPAP with high flow oxygen  Acute hypercapnia most likely medication induced  Acute kidney injury with hyperkalemia    Anemia  Features of obstructive sleep apnea  A. Fib  Congestive heart failure    ECHO 7/21/2021 7/21 EF 30 % RVSP 31      Recommendations:    Patient will benefit from noninvasive ventilation due to underlying obstructive sleep apnea and pulmonary edema    Avoid any sedatives or narcotics    Diuresis Lasix 80 mg twice daily  -per nephrology  Anticoagulation-Eliquis  Bronchodilator   Glycemic control               LOS: 8 days     Subjective         Objective     Vital signs for last 24 hours:  Vitals:    08/11/21 1737 08/11/21 2211 08/12/21 0159 08/12/21 0555   BP: 126/60 122/62 107/65 116/68   BP Location: Left arm Left arm Left arm Left arm   Patient Position: Lying Lying Lying Lying   Pulse: 74 81 96 93   Resp: 12 20 20 20   Temp: 98.2 °F (36.8 °C) 99 °F (37.2 °C) 97.8 °F (36.6 °C) 97.6 °F (36.4 °C)   TempSrc: Axillary Oral Axillary Oral   SpO2: 98% 90% 95% 96%    Weight:    115 kg (254 lb 10.1 oz)   Height:           Intake/Output last 3 shifts:  I/O last 3 completed shifts:  In: 1220 [P.O.:1220]  Out: 600 [Urine:600]  Intake/Output this shift:  No intake/output data recorded.      Radiology  Imaging Results (Last 24 Hours)     ** No results found for the last 24 hours. **          Labs:  Results from last 7 days   Lab Units 08/06/21  0625   WBC 10*3/mm3 5.70   HEMOGLOBIN g/dL 8.1*   HEMATOCRIT % 24.0*   PLATELETS 10*3/mm3 307     Results from last 7 days   Lab Units 08/12/21  0432   SODIUM mmol/L 132*   POTASSIUM mmol/L 4.7  4.7   CHLORIDE mmol/L 89*   CO2 mmol/L 33.0*   BUN mg/dL 77*   CREATININE mg/dL 2.93*   CALCIUM mg/dL 8.4*   GLUCOSE mg/dL 161*     Results from last 7 days   Lab Units 08/11/21  0833   PH, ARTERIAL pH units 7.361   PO2 ART mm Hg 42.8*   PCO2, ARTERIAL mm Hg 55.7*   HCO3 ART mmol/L 31.5*     Results from last 7 days   Lab Units 08/12/21  0432 08/11/21  0745   ALBUMIN g/dL 3.60 3.70                               Meds:   SCHEDULE  amiodarone, 200 mg, Oral, Q12H  apixaban, 5 mg, Oral, Q12H  aspirin, 81 mg, Oral, Daily  atorvastatin, 40 mg, Oral, Nightly  carvedilol, 6.25 mg, Oral, BID With Meals  cholecalciferol, 1,000 Units, Oral, Daily  dilTIAZem CD, 240 mg, Oral, Q24H  donepezil, 10 mg, Oral, Nightly  DULoxetine, 60 mg, Oral, Daily  furosemide, 80 mg, Intravenous, BID  hydrALAZINE, 50 mg, Oral, Q12H  insulin lispro, 0-9 Units, Subcutaneous, TID AC  ipratropium-albuterol, 3 mL, Nebulization, 4x Daily - RT  isosorbide mononitrate, 60 mg, Oral, BID - Nitrates  sodium chloride, 10 mL, Intravenous, Q12H  vitamin B-12, 1,000 mcg, Oral, Daily      Infusions     PRNs  •  acetaminophen **OR** acetaminophen **OR** acetaminophen  •  aluminum-magnesium hydroxide-simethicone  •  dextrose  •  dextrose  •  glucagon (human recombinant)  •  insulin lispro **AND** insulin lispro  •  melatonin  •  nitroglycerin  •  ondansetron **OR** ondansetron  •  [COMPLETED] Insert  peripheral IV **AND** sodium chloride  •  sodium chloride    Physical Exam:  Physical Exam  Vitals reviewed.   Pulmonary:      Breath sounds: Decreased breath sounds and rhonchi present.   Musculoskeletal:      Right lower leg: Edema present.      Left lower leg: Edema present.   Neurological:      Mental Status: He is alert and oriented to person, place, and time.         ROS  Review of Systems   Constitutional: Positive for activity change.   Respiratory: Positive for shortness of breath.    Cardiovascular: Positive for leg swelling.   Neurological: Positive for weakness.       I have reviewed the patients new clinical results.    Electronically signed by FABIOLA Bui

## 2021-08-13 LAB
ALBUMIN SERPL-MCNC: 3.6 G/DL (ref 3.5–5.2)
ANION GAP SERPL CALCULATED.3IONS-SCNC: 11 MMOL/L (ref 5–15)
BUN SERPL-MCNC: 82 MG/DL (ref 8–23)
BUN/CREAT SERPL: 27.8 (ref 7–25)
CALCIUM SPEC-SCNC: 8.3 MG/DL (ref 8.6–10.5)
CHLORIDE SERPL-SCNC: 89 MMOL/L (ref 98–107)
CO2 SERPL-SCNC: 33 MMOL/L (ref 22–29)
CREAT SERPL-MCNC: 2.95 MG/DL (ref 0.76–1.27)
DEPRECATED RDW RBC AUTO: 49.9 FL (ref 37–54)
ERYTHROCYTE [DISTWIDTH] IN BLOOD BY AUTOMATED COUNT: 16.5 % (ref 12.3–15.4)
GFR SERPL CREATININE-BSD FRML MDRD: 21 ML/MIN/1.73
GLUCOSE BLDC GLUCOMTR-MCNC: 180 MG/DL (ref 70–105)
GLUCOSE BLDC GLUCOMTR-MCNC: 212 MG/DL (ref 70–105)
GLUCOSE BLDC GLUCOMTR-MCNC: 226 MG/DL (ref 70–105)
GLUCOSE BLDC GLUCOMTR-MCNC: 243 MG/DL (ref 70–105)
GLUCOSE SERPL-MCNC: 162 MG/DL (ref 65–99)
HCT VFR BLD AUTO: 22.1 % (ref 37.5–51)
HGB BLD-MCNC: 7.5 G/DL (ref 13–17.7)
MAGNESIUM SERPL-MCNC: 2.2 MG/DL (ref 1.6–2.4)
MCH RBC QN AUTO: 29.6 PG (ref 26.6–33)
MCHC RBC AUTO-ENTMCNC: 34 G/DL (ref 31.5–35.7)
MCV RBC AUTO: 87.1 FL (ref 79–97)
PHOSPHATE SERPL-MCNC: 5.3 MG/DL (ref 2.5–4.5)
PLATELET # BLD AUTO: 279 10*3/MM3 (ref 140–450)
PMV BLD AUTO: 8.5 FL (ref 6–12)
POTASSIUM SERPL-SCNC: 4.3 MMOL/L (ref 3.5–5.2)
POTASSIUM SERPL-SCNC: 4.4 MMOL/L (ref 3.5–5.2)
POTASSIUM SERPL-SCNC: 4.4 MMOL/L (ref 3.5–5.2)
RBC # BLD AUTO: 2.54 10*6/MM3 (ref 4.14–5.8)
SODIUM SERPL-SCNC: 133 MMOL/L (ref 136–145)
WBC # BLD AUTO: 7.7 10*3/MM3 (ref 3.4–10.8)

## 2021-08-13 PROCEDURE — 94660 CPAP INITIATION&MGMT: CPT

## 2021-08-13 PROCEDURE — 94760 N-INVAS EAR/PLS OXIMETRY 1: CPT

## 2021-08-13 PROCEDURE — 97530 THERAPEUTIC ACTIVITIES: CPT

## 2021-08-13 PROCEDURE — 25010000002 FUROSEMIDE PER 20 MG: Performed by: INTERNAL MEDICINE

## 2021-08-13 PROCEDURE — 84132 ASSAY OF SERUM POTASSIUM: CPT | Performed by: INTERNAL MEDICINE

## 2021-08-13 PROCEDURE — 94799 UNLISTED PULMONARY SVC/PX: CPT

## 2021-08-13 PROCEDURE — 99232 SBSQ HOSP IP/OBS MODERATE 35: CPT | Performed by: INTERNAL MEDICINE

## 2021-08-13 PROCEDURE — 80069 RENAL FUNCTION PANEL: CPT | Performed by: INTERNAL MEDICINE

## 2021-08-13 PROCEDURE — 63710000001 INSULIN LISPRO (HUMAN) PER 5 UNITS: Performed by: INTERNAL MEDICINE

## 2021-08-13 PROCEDURE — 25010000002 EPOETIN ALFA-EPBX 10000 UNIT/ML SOLUTION: Performed by: INTERNAL MEDICINE

## 2021-08-13 PROCEDURE — 82962 GLUCOSE BLOOD TEST: CPT

## 2021-08-13 PROCEDURE — 85027 COMPLETE CBC AUTOMATED: CPT | Performed by: INTERNAL MEDICINE

## 2021-08-13 PROCEDURE — 83735 ASSAY OF MAGNESIUM: CPT | Performed by: INTERNAL MEDICINE

## 2021-08-13 RX ADMIN — INSULIN LISPRO 4 UNITS: 100 INJECTION, SOLUTION INTRAVENOUS; SUBCUTANEOUS at 18:19

## 2021-08-13 RX ADMIN — FUROSEMIDE 80 MG: 10 INJECTION, SOLUTION INTRAMUSCULAR; INTRAVENOUS at 10:26

## 2021-08-13 RX ADMIN — Medication 10 ML: at 10:24

## 2021-08-13 RX ADMIN — INSULIN LISPRO 4 UNITS: 100 INJECTION, SOLUTION INTRAVENOUS; SUBCUTANEOUS at 13:02

## 2021-08-13 RX ADMIN — FUROSEMIDE 80 MG: 10 INJECTION, SOLUTION INTRAMUSCULAR; INTRAVENOUS at 20:17

## 2021-08-13 RX ADMIN — METOLAZONE 5 MG: 5 TABLET ORAL at 10:25

## 2021-08-13 RX ADMIN — IPRATROPIUM BROMIDE AND ALBUTEROL SULFATE 3 ML: 2.5; .5 SOLUTION RESPIRATORY (INHALATION) at 06:38

## 2021-08-13 RX ADMIN — APIXABAN 5 MG: 5 TABLET, FILM COATED ORAL at 20:18

## 2021-08-13 RX ADMIN — IPRATROPIUM BROMIDE AND ALBUTEROL SULFATE 3 ML: 2.5; .5 SOLUTION RESPIRATORY (INHALATION) at 18:39

## 2021-08-13 RX ADMIN — AMIODARONE HYDROCHLORIDE 200 MG: 200 TABLET ORAL at 20:17

## 2021-08-13 RX ADMIN — CYANOCOBALAMIN TAB 1000 MCG 1000 MCG: 1000 TAB at 10:26

## 2021-08-13 RX ADMIN — IPRATROPIUM BROMIDE AND ALBUTEROL SULFATE 3 ML: 2.5; .5 SOLUTION RESPIRATORY (INHALATION) at 14:53

## 2021-08-13 RX ADMIN — Medication 1000 UNITS: at 10:25

## 2021-08-13 RX ADMIN — AMIODARONE HYDROCHLORIDE 200 MG: 200 TABLET ORAL at 10:26

## 2021-08-13 RX ADMIN — IPRATROPIUM BROMIDE AND ALBUTEROL SULFATE 3 ML: 2.5; .5 SOLUTION RESPIRATORY (INHALATION) at 11:07

## 2021-08-13 RX ADMIN — APIXABAN 5 MG: 5 TABLET, FILM COATED ORAL at 10:25

## 2021-08-13 RX ADMIN — HYDRALAZINE HYDROCHLORIDE 50 MG: 25 TABLET, FILM COATED ORAL at 20:21

## 2021-08-13 RX ADMIN — CARVEDILOL 6.25 MG: 6.25 TABLET, FILM COATED ORAL at 06:03

## 2021-08-13 RX ADMIN — ATORVASTATIN CALCIUM 40 MG: 40 TABLET, FILM COATED ORAL at 20:18

## 2021-08-13 RX ADMIN — DONEPEZIL HYDROCHLORIDE 10 MG: 5 TABLET, FILM COATED ORAL at 20:18

## 2021-08-13 RX ADMIN — ASPIRIN 81 MG: 81 TABLET, CHEWABLE ORAL at 10:25

## 2021-08-13 RX ADMIN — DILTIAZEM HYDROCHLORIDE 240 MG: 240 CAPSULE, COATED, EXTENDED RELEASE ORAL at 10:26

## 2021-08-13 RX ADMIN — EPOETIN ALFA-EPBX 20000 UNITS: 10000 INJECTION, SOLUTION INTRAVENOUS; SUBCUTANEOUS at 21:31

## 2021-08-13 RX ADMIN — Medication 10 ML: at 20:16

## 2021-08-13 RX ADMIN — DULOXETINE 60 MG: 30 CAPSULE, DELAYED RELEASE ORAL at 10:26

## 2021-08-13 NOTE — PROGRESS NOTES
"PULMONARY CRITICAL CARE Progress  NOTE      PATIENT IDENTIFICATION:  Name: Benson Mclean  MRN: [id number]  :  1950     Age: 70 y.o.  Sex: male    DATE OF Note:  2021   Referring Physician: Jose Guadalupe Corea DO                  Subjective:   Feeling better, no new issue, no SOB no chest pain, no nausea or vomiting, no change in bowel habit, no dysuria,  no new  skin rash or itching.      Objective:  tMax 24 hrs: Temp (24hrs), Av.2 °F (36.8 °C), Min:97.6 °F (36.4 °C), Max:99 °F (37.2 °C)      Vitals Ranges:   Temp:  [97.6 °F (36.4 °C)-99 °F (37.2 °C)] 98.7 °F (37.1 °C)  Heart Rate:  [] 70  Resp:  [11-22] 14  BP: (101-141)/(44-75) 117/58    Intake and Output Last 3 Shifts:   I/O last 3 completed shifts:  In: 684 [P.O.:684]  Out: 1475 [Urine:1475]    Exam:  /58 (BP Location: Right arm, Patient Position: Lying)   Pulse 70   Temp 98.7 °F (37.1 °C) (Axillary)   Resp 14   Ht 170.2 cm (67\")   Wt 115 kg (254 lb 10.1 oz)   SpO2 97%   BMI 39.88 kg/m²     General Appearance:     HEENT:  Normocephalic, without obvious abnormality, Conjunctiva/corneas clear,.  Normal external ear canals, Nares normal, no drainage     Neck:  Supple, symmetrical, trachea midline. No JVD.  Lungs /Chest wall:   Bilateral basal rhonchi, respirations unlabored symmetrical wall movement.     Heart:  Regular rate and rhythm, systolic murmur PMI left sternal border  Abdomen: Soft, non-tender, no masses, no organomegaly.    Extremities: Trace edema no clubbing or Cyanosis        Medications:    Current Facility-Administered Medications:   •  acetaminophen (TYLENOL) tablet 650 mg, 650 mg, Oral, Q4H PRN, 650 mg at 21 1713 **OR** acetaminophen (TYLENOL) 160 MG/5ML solution 650 mg, 650 mg, Oral, Q4H PRN **OR** acetaminophen (TYLENOL) suppository 650 mg, 650 mg, Rectal, Q4H PRN, Rayne Bah, APRN  •  aluminum-magnesium hydroxide-simethicone (MAALOX MAX) 400-400-40 MG/5ML suspension 15 mL, 15 mL, Oral, Q6H PRN, " Rayne Bah APRN  •  amiodarone (PACERONE) tablet 200 mg, 200 mg, Oral, Q12H, Jose G Rm MD, 200 mg at 08/13/21 1026  •  apixaban (ELIQUIS) tablet 5 mg, 5 mg, Oral, Q12H, Jose G Rm MD, 5 mg at 08/13/21 1025  •  aspirin chewable tablet 81 mg, 81 mg, Oral, Daily, Rayne Bah APRN, 81 mg at 08/13/21 1025  •  atorvastatin (LIPITOR) tablet 40 mg, 40 mg, Oral, Nightly, Rayne Bah APRN, 40 mg at 08/12/21 2059  •  carvedilol (COREG) tablet 6.25 mg, 6.25 mg, Oral, BID With Meals, Jose G Rm MD, 6.25 mg at 08/13/21 0603  •  cholecalciferol (VITAMIN D3) tablet 1,000 Units, 1,000 Units, Oral, Daily, Rayne Bah APRN, 1,000 Units at 08/13/21 1025  •  dextrose (D50W) 25 g/ 50mL Intravenous Solution 25 g, 25 g, Intravenous, Q15 Min PRN, Stanton Junior MD  •  dextrose (GLUTOSE) oral gel 15 g, 15 g, Oral, Q15 Min PRN, Stanton Junior MD  •  dilTIAZem CD (CARDIZEM CD) 24 hr capsule 240 mg, 240 mg, Oral, Q24H, Jose G Rm MD, 240 mg at 08/13/21 1026  •  donepezil (ARICEPT) tablet 10 mg, 10 mg, Oral, Nightly, Rayne Bah APRN, 10 mg at 08/12/21 2059  •  DULoxetine (CYMBALTA) DR capsule 60 mg, 60 mg, Oral, Daily, Rayne Bah APRN, 60 mg at 08/13/21 1026  •  epoetin izabella-epbx (RETACRIT) injection 20,000 Units, 20,000 Units, Subcutaneous, Weekly, Yony Bhat MD  •  furosemide (LASIX) injection 80 mg, 80 mg, Intravenous, BID, Yony Bhat MD, 80 mg at 08/13/21 1026  •  glucagon (human recombinant) (GLUCAGEN DIAGNOSTIC) injection 1 mg, 1 mg, Subcutaneous, Q15 Min PRN, Stanton Junior MD  •  hydrALAZINE (APRESOLINE) tablet 50 mg, 50 mg, Oral, Q12H, Rayne Bah, APRN, 50 mg at 08/10/21 2028  •  insulin lispro (ADMELOG) injection 0-9 Units, 0-9 Units, Subcutaneous, TID AC, 4 Units at 08/13/21 1302 **AND** insulin lispro (ADMELOG) injection 0-9 Units, 0-9 Units, Subcutaneous, PRN, Stanton Junior MD, 2 Units at 08/11/21 2024  •  ipratropium-albuterol (DUO-NEB) nebulizer  solution 3 mL, 3 mL, Nebulization, 4x Daily - RT, Rayne Bah APRN, 3 mL at 08/13/21 1107  •  isosorbide mononitrate (IMDUR) 24 hr tablet 60 mg, 60 mg, Oral, BID - Nitrates, Rayne Bah APRN, 60 mg at 08/12/21 0942  •  melatonin tablet 5 mg, 5 mg, Oral, Nightly PRN, Rayne Bah APRN  •  metOLazone (ZAROXOLYN) tablet 5 mg, 5 mg, Oral, Daily, Katerina Beyer MD, 5 mg at 08/13/21 1025  •  nitroglycerin (NITROSTAT) SL tablet 0.4 mg, 0.4 mg, Sublingual, Q5 Min PRN, Rayne Bah APRN  •  ondansetron (ZOFRAN) tablet 4 mg, 4 mg, Oral, Q6H PRN **OR** ondansetron (ZOFRAN) injection 4 mg, 4 mg, Intravenous, Q6H PRN, Rayne Bah APRN  •  [COMPLETED] Insert peripheral IV, , , Once **AND** sodium chloride 0.9 % flush 10 mL, 10 mL, Intravenous, PRN, Davis Zavala MD  •  sodium chloride 0.9 % flush 10 mL, 10 mL, Intravenous, Q12H, Rayne Bah APRN, 10 mL at 08/13/21 1024  •  sodium chloride 0.9 % flush 10 mL, 10 mL, Intravenous, PRN, Rayne Bah APRN  •  vitamin B-12 (CYANOCOBALAMIN) tablet 1,000 mcg, 1,000 mcg, Oral, Daily, Rayne Bah APRN, 1,000 mcg at 08/13/21 1026    Data Review:  All labs (24hrs):   Recent Results (from the past 24 hour(s))   POC Glucose Once    Collection Time: 08/12/21  4:06 PM    Specimen: Blood   Result Value Ref Range    Glucose 225 (H) 70 - 105 mg/dL   POC Glucose Once    Collection Time: 08/12/21  8:40 PM    Specimen: Blood   Result Value Ref Range    Glucose 223 (H) 70 - 105 mg/dL   Potassium    Collection Time: 08/13/21  3:21 AM    Specimen: Blood   Result Value Ref Range    Potassium 4.4 3.5 - 5.2 mmol/L   Renal Function Panel    Collection Time: 08/13/21  3:21 AM    Specimen: Blood   Result Value Ref Range    Glucose 162 (H) 65 - 99 mg/dL    BUN 82 (H) 8 - 23 mg/dL    Creatinine 2.95 (H) 0.76 - 1.27 mg/dL    Sodium 133 (L) 136 - 145 mmol/L    Potassium 4.4 3.5 - 5.2 mmol/L    Chloride 89 (L) 98 - 107 mmol/L    CO2 33.0 (H) 22.0 - 29.0 mmol/L    Calcium 8.3 (L) 8.6 -  10.5 mg/dL    Albumin 3.60 3.50 - 5.20 g/dL    Phosphorus 5.3 (H) 2.5 - 4.5 mg/dL    Anion Gap 11.0 5.0 - 15.0 mmol/L    BUN/Creatinine Ratio 27.8 (H) 7.0 - 25.0    eGFR Non African Amer 21 (L) >60 mL/min/1.73   Magnesium    Collection Time: 08/13/21  3:21 AM    Specimen: Blood   Result Value Ref Range    Magnesium 2.2 1.6 - 2.4 mg/dL   CBC (No Diff)    Collection Time: 08/13/21  3:21 AM    Specimen: Blood   Result Value Ref Range    WBC 7.70 3.40 - 10.80 10*3/mm3    RBC 2.54 (L) 4.14 - 5.80 10*6/mm3    Hemoglobin 7.5 (L) 13.0 - 17.7 g/dL    Hematocrit 22.1 (L) 37.5 - 51.0 %    MCV 87.1 79.0 - 97.0 fL    MCH 29.6 26.6 - 33.0 pg    MCHC 34.0 31.5 - 35.7 g/dL    RDW 16.5 (H) 12.3 - 15.4 %    RDW-SD 49.9 37.0 - 54.0 fl    MPV 8.5 6.0 - 12.0 fL    Platelets 279 140 - 450 10*3/mm3   Potassium    Collection Time: 08/13/21  7:34 AM    Specimen: Blood   Result Value Ref Range    Potassium 4.3 3.5 - 5.2 mmol/L   POC Glucose Once    Collection Time: 08/13/21  7:52 AM    Specimen: Blood   Result Value Ref Range    Glucose 180 (H) 70 - 105 mg/dL   POC Glucose Once    Collection Time: 08/13/21 11:44 AM    Specimen: Blood   Result Value Ref Range    Glucose 226 (H) 70 - 105 mg/dL        Imaging:  XR Chest 1 View  Narrative: DATE OF EXAM:  8/11/2021 7:59 AM     PROCEDURE:  XR CHEST 1 VW-     INDICATIONS:  dyspnea; R00.0-Tachycardia, unspecified; R06.00-Dyspnea, unspecified       COMPARISON:  AP portable chest radiograph 8/3/2021.     TECHNIQUE:   Single radiographic view of the chest was obtained.     FINDINGS:  Small layering bilateral pleural effusions are present, left greater  than right, not thought to be significantly changed. Diffuse  interstitial opacities are seen throughout both lungs with more  confluent alveolar disease in the bases. The interstitial and alveolar  disease has increased compared to 8/3/2021. There is stable cardiac  enlargement with CABG. No visible pneumothorax.     Impression:    1. Increasing  interstitial and alveolar disease in both lungs compared  to 8/3/2021 may represent worsening pulmonary edema.  2. Small bilateral pleural effusions, left greater than right, are  thought to be similar to the prior exam.  3. Stable cardiomegaly.     Electronically Signed By-Demetria Jolley MD On:8/11/2021 8:29 AM  This report was finalized on [id number] by  Demetria Jolley MD.       ASSESSMENT:  Pneumonia  Bilateral pleural effusion  Atrial fibrillation with RVR (CMS/HCC)    S/P CABG (coronary artery bypass graft)    Essential hypertension    Elevated troponin    ANNETTE treated with BiPAP    Mixed hyperlipidemia    Flaccid hemiplegia of right dominant side as late effect of cerebral infarction (CMS/HCC)    Diabetic neuropathy (CMS/HCC)    Unspecified dementia with behavioral disturbance (CMS/HCC)    Coronary artery disease    Chronic venous hypertension (idiopathic) with ulcer and inflammation of bilateral lower extremity (CMS/HCC)    Chronic obstructive pulmonary disease, unspecified (CMS/HCC)    Chronic foot ulcer (CMS/HCC)    CKD (chronic kidney disease) stage 3, GFR 30-59 ml/min (CMS/HCC)    Tobacco use disorder    Acute on chronic systolic heart failure (CMS/HCC)    Anemia of renal disease     PLAN:  O2 support  Continue anticoagulation for now  Diuretics  Bronchodilator  Inhaled corticosteroids  Electrolytes/ glycemic control  DVT and GI prophylaxis.    Total Critical care time in direct medical management (   ) minutes  Marcelo Álvarez MD. D, ABSM.     8/13/2021  13:25 EDT

## 2021-08-13 NOTE — THERAPY TREATMENT NOTE
Subjective: Pt agreeable to therapeutic plan of care.  Cognition: oriented to Person, Place, Time and Situation    Objective:     Bed Mobility: Min-A and Assist x 2  Functional Transfers: Min-A and Assist x 2  Functional Ambulation: N/A or Not attempted.    Lower Body Dressing: Max-A  ADL Position: supported sitting      Pain: 0 VAS  Education: Provided education on importance of mobility and skilled verbal / tactile cueing throughout intervention.     Assessment: Benson Mclean presents with ADL impairments below baseline abilities which indicate the need for continued skilled intervention while inpatient. Tolerating session today without incident. Will continue to follow and progress as tolerated.     Plan/Recommendations:   Pt would benefit from Inpatient Rehabilitation placement at discharge from facility.   Pt desires Inpatient Rehabilitation placement at discharge. Pt cooperative; agreeable to therapeutic recommendations and plan of care.     Modified [place]: N/A = No pre-op stroke/TIA    Post-Tx Position: Up in Chair  PPE: gloves, surgical mask, eyewear protection

## 2021-08-13 NOTE — PROGRESS NOTES
[facility] Services Daily Progress Note    Patient Name: Benson Mclean  : 1950  MRN: [id number]  Primary Care Physician:  Samantha Zabala APRN  Date of admission: 8/3/2021      Subjective      Chief Complaint: Palpitations      Patient Reports Benson Mclean is a 70 y.o. male w/PMH of CHF, CKD, DM, HTN, HLD, CAD, COPD, A. fib, CABG, ANNETTE, CVA, neuropathy, obesity, depression, COVID-19 who presents to [facility] ED with palpitations, patient was discharged from this facility yesterday after a 12-day admission.  Patient states tachycardia progressively worsened after discharge from this facility yesterday, no aggravating or alleviating factors noted.  Patient admits to chest pain, nausea, weakness, palpitations, edema, dyspnea.  Patient denies fever, chills, nausea, vomiting.  As tachycardia did not improve he decided to go to [facility] ED.        Upon arrival to the ED vitals temp 97, HR 93, RR 22, /75, O2 sat 97% on 4 L nasal cannula.  Labs notable for troponin 0.117, proBNP 7648, glucose 198, , K4.5, CO2 32, creatinine 1.91, BUN 68, GFR 35, WBC 7.4, Hgb 8.0, platelets 334, neutrophils 65.9.  EKG shows tachycardia rate 126 repolarization abnormality suggest ischemia diffuse leads.  Chest x-ray shows stable cardiomegaly with pulmonary interstitial edema pattern trace bilateral pleural effusions.  Patient treated in the ED with 10 mg IV Cardizem, IV Cardizem drip initiated.    Readmitted from [facility] after having been discharged 24 hours ago. 21, 3:42 PM EDT     Patient much improved.  Heart rate controlled.  No significant complaints 21, 8:38 PM EDT    After I made the discharge yesterday I was told patient needs a pre-CERT.  Hence discharge canceled 21, 5:06 PM EDT    No new complaints. 21, 2:39 PM EDT    21:HR has been stable and he has no new complaints. No chest pain.     8/10/21:feeling  stable this am however nurse reports episode of bradycardia last night where patient felt dizzy. No chest pain.     8/11/2021: Apparently last night patient had some worsening shortness of breath oxygen had to be turned up.  Had to wear the CPAP.  Reports breathing is stable today    8/12/2021: Oxygen requirement still on 8 L today and having some slight shortness of breath.  No chest pain.    8/13/21: Per nurse has been on BiPAP most of the day yesterday and through the night.  O2 requirements remain slightly high 8 L when off BiPAP    Review of Systems   Cardiovascular: Negative for chest pain and palpitations.            Objective      Vitals:   Temp:  [97.6 °F (36.4 °C)-99 °F (37.2 °C)] 98.7 °F (37.1 °C)  Heart Rate:  [] 70  Resp:  [11-22] 14  BP: (104-141)/(45-75) 117/58    Physical Exam  Vitals reviewed.   HENT:      Head: Normocephalic.   Eyes:      Pupils: Pupils are equal, round, and reactive to light.   Cardiovascular:      Rate and Rhythm: Rhythm irregular.   Pulmonary:      Comments: On BiPAP and diminished at the bases, mild respiratory distress  Abdominal:      General: There is no distension.      Palpations: Abdomen is soft.   Musculoskeletal:      Right lower leg: Edema present.      Left lower leg: Edema present.   Neurological:      Mental Status: He is alert. Mental status is at baseline.   Psychiatric:         Mood and Affect: Mood normal.        Noted      Result Review    Result Review:  I have personally reviewed the results from the time of this admission to 8/13/2021 13:54 EDT and agree with these findings:  [x]  Laboratory  []  Microbiology  [x]  Radiology  []  EKG/Telemetry   []  Cardiology/Vascular   []  Pathology  [x]  Old records  []  Other:             Assessment/Plan      Brief Patient Summary:  Benson Mclean is a 70 y.o. male who       amiodarone, 200 mg, Oral, Q12H  apixaban, 5 mg, Oral, Q12H  aspirin, 81 mg, Oral, Daily  atorvastatin, 40 mg, Oral, Nightly  carvedilol,  6.25 mg, Oral, BID With Meals  cholecalciferol, 1,000 Units, Oral, Daily  dilTIAZem CD, 240 mg, Oral, Q24H  donepezil, 10 mg, Oral, Nightly  DULoxetine, 60 mg, Oral, Daily  epoetin izabella-epbx, 20,000 Units, Subcutaneous, Weekly  furosemide, 80 mg, Intravenous, BID  hydrALAZINE, 50 mg, Oral, Q12H  insulin lispro, 0-9 Units, Subcutaneous, TID AC  ipratropium-albuterol, 3 mL, Nebulization, 4x Daily - RT  isosorbide mononitrate, 60 mg, Oral, BID - Nitrates  metOLazone, 5 mg, Oral, Daily  sodium chloride, 10 mL, Intravenous, Q12H  vitamin B-12, 1,000 mcg, Oral, Daily             Active Hospital Problems:  Active Hospital Problems    Diagnosis    • **Atrial fibrillation with RVR (CMS/[facility])    • Anemia of renal disease    • Acute on chronic systolic heart failure (CMS/[facility])    • Tobacco use disorder    • CKD (chronic kidney disease) stage 3, GFR 30-59 ml/min (CMS/[facility])    • Mixed hyperlipidemia    • Chronic obstructive pulmonary disease, unspecified (CMS/[facility])    • Flaccid hemiplegia of right dominant side as late effect of cerebral infarction (CMS/[facility])    • Unspecified dementia with behavioral disturbance (CMS/[facility])    • Elevated troponin    • Essential hypertension    • S/P CABG (coronary artery bypass graft)    • Chronic venous hypertension (idiopathic) with ulcer and inflammation of bilateral lower extremity (CMS/[facility])    • Chronic foot ulcer (CMS/[facility])    • Coronary artery disease    • ANNETTE treated with BiPAP    • Diabetic neuropathy (CMS/[facility])      Plan:     Acute on chronic hypercapnic/hypoxic respiratory failure  ANNETTE with BiPAP:  -Has been on more continuous BiPAP and at bedtime to attempt to wean  -Hopefully will improve with diuresis  -pulmonary following    Atrial fibrillation:   -on cardizem, amiodarone and Eliquis  -Heart rate seems stable now  -cardiology following    DILEEP on CKD IV  -Cr still high at 2.9  -nephrology following and managing diuresis  -Currently on IV Lasix 80mg twice daily and metolazone  -BMP in  am    Hyperkalemia  -Improved and K stable  -treated per protocol   -aldactone stopped and may need to d/c of K remains high normal     Acute on chronic HFrEF due to A. fib  -Decompensated as noted above  -Diuresis as noted above  -cannot use ACE/ARB d/t Cr above    Essential hypertension:  -Continue home medication hydralazine and imdur     Dyslipidemia:  -Continue home medication atorvastatin 40 mg p.o. nightly     Obesity:  -encourage dietary modifications     Coronary artery disease/history of stent/history of CABG  -Continue ASA  And imdur     COPD:  -no exacerbation        History of CVA/right hemiaplasia:  -CVA in November 2020     Diabetic neuropathy:  Depression:  Unspecified dementia:  -chronic conditions  -Stopped gabapentin as it was causing some upper extremity tremor with worsening renal failure    DVT prophylaxis:  Medical DVT prophylaxis orders are present.    CODE STATUS:    Code Status: CPR  Medical Interventions (Level of Support Prior to Arrest): Full      Disposition: To rehab if accepted and when cleared by specialist    This patient has been examined wearing appropriate Personal Protective Wbjndrnen03/13/21      Electronically signed by Jose Guadalupe Corea DO, 08/13/21, 13:54 EDT.  [facility] Hospitalist Team

## 2021-08-13 NOTE — PROGRESS NOTES
Referring Provider: Jose Guadalupe Corea DO    Reason for follow-up:  Atrial fibrillation  Cardiomyopathy  Shortness of breath  Bradycardia    Patient Care Team:  Samantha Zabala APRN as PCP - General  Samantha Zabala APRN as PCP - Family Medicine  Jose G Rm MD as Consulting Physician (Cardiology)    Subjective .  Feeling better     ROS  Patient had problems with hypoxia.    Since I have last seen him, the patient has been without any chest discomfort ,shortness of breath, palpitations, dizziness or syncope.  Denies having any headache ,abdominal pain ,nausea, vomiting , diarrhea constipation, loss of weight or loss of appetite.  Denies having any excessive bruising ,hematuria or blood in the stool.    Review of all systems negative except as indicated    History  Past Medical History:   Diagnosis Date   • Appetite loss    • Brain bleed (CMS/HCC)    • Carpal tunnel syndrome on left     carpal tunnel on left hand   • CHF (congestive heart failure) (CMS/HCC)    • Diabetic neuropathy (CMS/HCC)    • DM2 (diabetes mellitus, type 2) (CMS/HCC)    • History of angina    • Hyperlipidemia    • Hypogonadism in male    • Obesity    • Sleep apnea    • Stroke (CMS/HCC)    • Unsteady gait        Past Surgical History:   Procedure Laterality Date   • ANGIOPLASTY      X2   • APPENDECTOMY     • CARDIAC CATHETERIZATION  11/13/2015   • CARDIAC CATHETERIZATION N/A 5/24/2021    Procedure: Left Heart Cath and coronary angiogram;  Surgeon: Jose G Rm MD;  Location: [facility] CATH INVASIVE LOCATION;  Service: Cardiovascular;  Laterality: N/A;   • CARDIAC CATHETERIZATION  5/24/2021    Procedure: Saphenous Vein Graft;  Surgeon: Jose G Rm MD;  Location: [facility] CATH INVASIVE LOCATION;  Service: Cardiovascular;;   • CARDIAC CATHETERIZATION N/A 5/24/2021    Procedure: Percutaneous Coronary Intervention;  Surgeon: Bernabe Zambrano MD;  Location: [facility] CATH INVASIVE LOCATION;  Service: Cardiology;  Laterality: N/A;   • CARDIAC  CATHETERIZATION N/A 5/24/2021    Procedure: Stent NIELS coronary;  Surgeon: Bernabe Zambrano MD;  Location:  [facility] CATH INVASIVE LOCATION;  Service: Cardiology;  Laterality: N/A;   • CARDIAC CATHETERIZATION N/A 5/26/2021    Procedure: Percutaneous Coronary Intervention;  Surgeon: Bernabe Zambrano MD;  Location:  [facility] CATH INVASIVE LOCATION;  Service: Cardiovascular;  Laterality: N/A;   • CARDIAC CATHETERIZATION N/A 5/26/2021    Procedure: Stent NIELS bypass graft;  Surgeon: Bernabe Zambrano MD;  Location:  [facility] CATH INVASIVE LOCATION;  Service: Cardiovascular;  Laterality: N/A;   • CARDIAC ELECTROPHYSIOLOGY PROCEDURE N/A 5/24/2021    Procedure: Temporary Pacemaker;  Surgeon: Bernabe Zambrano MD;  Location:  [facility] CATH INVASIVE LOCATION;  Service: Cardiology;  Laterality: N/A;   • CARDIAC ELECTROPHYSIOLOGY PROCEDURE N/A 5/26/2021    Procedure: Temporary Pacemaker;  Surgeon: Bernabe Zambrano MD;  Location: [facility] CATH INVASIVE LOCATION;  Service: Cardiovascular;  Laterality: N/A;   • CARPAL TUNNEL RELEASE Left 04/29/2018    carpal tunnel- lt hand// other hand surgeries    • CATARACT EXTRACTION, BILATERAL  2002    Dr. Lux Acosta   • CHOLECYSTECTOMY     • COLON RESECTION  2005   • CORONARY ANGIOPLASTY     • CORONARY ANGIOPLASTY WITH STENT PLACEMENT  11/13/2015    PTCA stent to proximal in stent and mid to distal lad   • CORONARY ANGIOPLASTY WITH STENT PLACEMENT  09/16/2016    PTCA stent to mid lad and stent to vein graft to marginal   • CORONARY ARTERY BYPASS GRAFT  2005    @ [facility]   • CYST REMOVAL      cyst removed from scrotum   • FOOT SURGERY Right 07/17/2018   • FOOT SURGERY Left 02/04/2019   • TOE AMPUTATION Right     right toe removed D/T infected cut that went to the bone       Family History   Problem Relation Age of Onset   • Heart disease Mother    • Heart disease Father    • Diabetes Sister    • Heart disease Sister    • Diabetes Brother    • Mental illness Brother        Social History     Tobacco Use   • Smoking status:  Former Smoker     Packs/day: 1.00     Years: 60.00     Pack years: 60.00     Types: Cigarettes   • Smokeless tobacco: Never Used   • Tobacco comment: Patient quit smoking 11/2020   Vaping Use   • Vaping Use: Never used   Substance Use Topics   • Alcohol use: No   • Drug use: Yes     Frequency: 1.0 times per week     Types: Marijuana     Comment: socially        Medications Prior to Admission   Medication Sig Dispense Refill Last Dose   • albuterol sulfate  (90 Base) MCG/ACT inhaler Inhale 2 puffs Every 4 (Four) Hours As Needed.      • apixaban (ELIQUIS) 5 MG tablet tablet Take 5 mg by mouth Daily.      • aspirin 81 MG chewable tablet Chew 81 mg Daily.      • atorvastatin (LIPITOR) 40 MG tablet Take 40 mg by mouth Every Night.      • cholecalciferol (VITAMIN D3) 25 MCG (1000 UT) tablet Take 1,000 Units by mouth Daily.      • dextrose (GLUTOSE) 40 % gel Take  by mouth Every 1 (One) Hour As Needed for Low Blood Sugar.      • donepezil (ARICEPT) 10 MG tablet Take 10 mg by mouth Every Night.      • DULoxetine HCl (DRIZALMA) 60 MG capsule Take 60 mg by mouth Daily.      • gabapentin (NEURONTIN) 100 MG capsule Take 2 capsules by mouth 2 (two) times a day. 6 capsule 0    • hydrALAZINE (APRESOLINE) 50 MG tablet Take 50 mg by mouth 2 (two) times a day. Hold for SBP <110      • isosorbide mononitrate (IMDUR) 60 MG 24 hr tablet Take 60 mg by mouth 2 (Two) Times a Day.      • metoprolol succinate XL (TOPROL-XL) 100 MG 24 hr tablet Take 1 tablet by mouth Daily for 30 days. 30 tablet 0    • nitroglycerin (NITROSTAT) 0.4 MG SL tablet Place 1 tablet under the tongue Every 5 (Five) Minutes As Needed for Chest Pain (Only if SBP Greater Than 100). Take no more than 3 doses in 15 minutes. 30 tablet 12    • spironolactone (ALDACTONE) 25 MG tablet Take 1 tablet by mouth Daily for 30 days. 30 tablet 0    • tiotropium (Spiriva HandiHaler) 18 MCG per inhalation capsule Place 1 capsule into inhaler and inhale Daily. 30 capsule 1    •  "vitamin B-12 (CYANOCOBALAMIN) 1000 MCG tablet Take 1,000 mcg by mouth Daily.      • [DISCONTINUED] furosemide (LASIX) 40 MG tablet Take 1 tablet by mouth 2 (Two) Times a Day for 30 days. 60 tablet 0        Allergies  Codeine    Scheduled Meds:amiodarone, 200 mg, Oral, Q12H  apixaban, 5 mg, Oral, Q12H  aspirin, 81 mg, Oral, Daily  atorvastatin, 40 mg, Oral, Nightly  carvedilol, 6.25 mg, Oral, BID With Meals  cholecalciferol, 1,000 Units, Oral, Daily  dilTIAZem CD, 240 mg, Oral, Q24H  donepezil, 10 mg, Oral, Nightly  DULoxetine, 60 mg, Oral, Daily  furosemide, 80 mg, Intravenous, BID  hydrALAZINE, 50 mg, Oral, Q12H  insulin lispro, 0-9 Units, Subcutaneous, TID AC  ipratropium-albuterol, 3 mL, Nebulization, 4x Daily - RT  isosorbide mononitrate, 60 mg, Oral, BID - Nitrates  metOLazone, 5 mg, Oral, Daily  sodium chloride, 10 mL, Intravenous, Q12H  vitamin B-12, 1,000 mcg, Oral, Daily      Continuous Infusions:   PRN Meds:.•  acetaminophen **OR** acetaminophen **OR** acetaminophen  •  aluminum-magnesium hydroxide-simethicone  •  dextrose  •  dextrose  •  glucagon (human recombinant)  •  insulin lispro **AND** insulin lispro  •  melatonin  •  nitroglycerin  •  ondansetron **OR** ondansetron  •  [COMPLETED] Insert peripheral IV **AND** sodium chloride  •  sodium chloride    Objective     VITAL SIGNS  Vitals:    08/13/21 0230 08/13/21 0617 08/13/21 0635 08/13/21 0640   BP: 123/75 141/74     BP Location: Left arm Left arm     Patient Position: Lying Lying     Pulse: 102 118  (!) 17   Resp: 18 18 22 19   Temp: 99 °F (37.2 °C) 98.7 °F (37.1 °C)     TempSrc: Axillary Axillary     SpO2: 97% 95% 92%    Weight:       Height:           Flowsheet Rows      First Filed Value   Admission Height  180.3 cm (71\") Documented at 08/03/2021 1733   Admission Weight  113 kg (250 lb) Documented at 08/03/2021 1733            Intake/Output Summary (Last 24 hours) at 8/13/2021 0647  Last data filed at 8/13/2021 0300  Gross per 24 hour   Intake " 444 ml   Output 1275 ml   Net -831 ml        TELEMETRY: Atrial fibrillation with controlled ventricular response.    Physical Exam:  The patient is alert, oriented and in no distress.  Patient is on BiPAP  Vital signs as noted above.  Exogenous obesity.  Head and neck revealed no carotid bruits or jugular venous distention.  No thyromegaly or lymphadenopathy is present  Lungs clear.  No wheezing.  Breath sounds are normal bilaterally.  Heart normal first and second heart sounds.  No murmur. No precordial rub is present.  No gallop is present.  Abdomen soft and nontender.  No organomegaly is present.  Extremities with good peripheral pulses without any pedal edema.  Skin warm and dry.  Musculoskeletal system is grossly normal  CNS grossly normal      Results Review:   I reviewed the patient's new clinical results.  Lab Results (last 24 hours)     Procedure Component Value Units Date/Time    Potassium [[id number]]  (Normal) Collected: 08/13/21 0321    Specimen: Blood Updated: 08/13/21 0410     Potassium 4.4 mmol/L     Renal Function Panel [[id number]]  (Abnormal) Collected: 08/13/21 0321    Specimen: Blood Updated: 08/13/21 0410     Glucose 162 mg/dL      BUN 82 mg/dL      Creatinine 2.95 mg/dL      Sodium 133 mmol/L      Potassium 4.4 mmol/L      Chloride 89 mmol/L      CO2 33.0 mmol/L      Calcium 8.3 mg/dL      Albumin 3.60 g/dL      Phosphorus 5.3 mg/dL      Anion Gap 11.0 mmol/L      BUN/Creatinine Ratio 27.8     eGFR Non African Amer 21 mL/min/1.73     Narrative:      GFR Normal >60  Chronic Kidney Disease <60  Kidney Failure <15      Magnesium [[id number]]  (Normal) Collected: 08/13/21 0321    Specimen: Blood Updated: 08/13/21 0410     Magnesium 2.2 mg/dL     CBC (No Diff) [[id number]]  (Abnormal) Collected: 08/13/21 0321    Specimen: Blood Updated: 08/13/21 0342     WBC 7.70 10*3/mm3      RBC 2.54 10*6/mm3      Hemoglobin 7.5 g/dL      Hematocrit 22.1 %      MCV 87.1 fL      MCH 29.6 pg      MCHC 34.0 g/dL       RDW 16.5 %      RDW-SD 49.9 fl      MPV 8.5 fL      Platelets 279 10*3/mm3     POC Glucose Once [[id number]]  (Abnormal) Collected: 08/12/21 2040    Specimen: Blood Updated: 08/12/21 2041     Glucose 223 mg/dL      Comment: Serial Number: [id number]:  [id number]       POC Glucose Once [[id number]]  (Abnormal) Collected: 08/12/21 1606    Specimen: Blood Updated: 08/12/21 1607     Glucose 225 mg/dL      Comment: Serial Number: [id number]:  [id number]       POC Glucose Once [[id number]]  (Abnormal) Collected: 08/12/21 1107    Specimen: Blood Updated: 08/12/21 1108     Glucose 230 mg/dL      Comment: Serial Number: [id number]:  [id number]             Imaging Results (Last 24 Hours)     ** No results found for the last 24 hours. **      LAB RESULTS (LAST 7 DAYS)    CBC  Results from last 7 days   Lab Units 08/13/21  0321   WBC 10*3/mm3 7.70   RBC 10*6/mm3 2.54*   HEMOGLOBIN g/dL 7.5*   HEMATOCRIT % 22.1*   MCV fL 87.1   PLATELETS 10*3/mm3 279       BMP  Results from last 7 days   Lab Units 08/13/21  0321 08/12/21  0432 08/12/21  0048 08/11/21  2044 08/11/21  1253 08/11/21  0745 08/10/21  2304 08/10/21  1249 08/10/21  0221 08/07/21  0314 08/07/21  0314   SODIUM mmol/L 133* 132*  --   --   --  136  --   --  132*  --  138   POTASSIUM mmol/L 4.4  4.4 4.7  4.7 4.6 5.1 4.8 5.4*  5.4* 5.9*   < > 5.7*   < > 4.6   CHLORIDE mmol/L 89* 89*  --   --   --  92*  --   --  89*  --  95*   CO2 mmol/L 33.0* 33.0*  --   --   --  34.0*  --   --  33.0*  --  33.0*   BUN mg/dL 82* 77*  --   --   --  72*  --   --  71*  --  67*   CREATININE mg/dL 2.95* 2.93*  --   --   --  2.81*  --   --  2.67*  --  2.35*   GLUCOSE mg/dL 162* 161*  --   --   --  183*  --   --  101*  --  139*   MAGNESIUM mg/dL 2.2  --   --   --   --   --   --   --   --   --   --    PHOSPHORUS mg/dL 5.3* 5.3*  --   --   --  5.7*  --   --   --   --   --     < > = values in this interval not displayed.       CMP   Results from last 7 days   Lab Units 08/13/21  7622  08/12/21  0432 08/12/21  0048 08/11/21  2044 08/11/21  1253 08/11/21  0745 08/10/21  2304 08/10/21  1249 08/10/21  0221 08/07/21  0314 08/07/21  0314   SODIUM mmol/L 133* 132*  --   --   --  136  --   --  132*  --  138   POTASSIUM mmol/L 4.4  4.4 4.7  4.7 4.6 5.1 4.8 5.4*  5.4* 5.9*   < > 5.7*   < > 4.6   CHLORIDE mmol/L 89* 89*  --   --   --  92*  --   --  89*  --  95*   CO2 mmol/L 33.0* 33.0*  --   --   --  34.0*  --   --  33.0*  --  33.0*   BUN mg/dL 82* 77*  --   --   --  72*  --   --  71*  --  67*   CREATININE mg/dL 2.95* 2.93*  --   --   --  2.81*  --   --  2.67*  --  2.35*   GLUCOSE mg/dL 162* 161*  --   --   --  183*  --   --  101*  --  139*   ALBUMIN g/dL 3.60 3.60  --   --   --  3.70  --   --   --   --   --     < > = values in this interval not displayed.         BNP        TROPONIN        CoAg        Creatinine Clearance  Estimated Creatinine Clearance: 28.4 mL/min (A) (by C-G formula based on SCr of 2.95 mg/dL (H)).    ABG  Results from last 7 days   Lab Units 08/11/21  0833 08/10/21  2240 08/10/21  2122   PH, ARTERIAL pH units 7.361 7.342* 7.295*   PCO2, ARTERIAL mm Hg 55.7* 66.1* 74.1*   PO2 ART mm Hg 42.8* 108.5* 93.3   O2 SATURATION ART % 75.2* 97.6 95.8   BASE EXCESS ART mmol/L 5.4* 8.8* 8.0*       Radiology  XR Chest 1 View    Result Date: 8/11/2021   1. Increasing interstitial and alveolar disease in both lungs compared to 8/3/2021 may represent worsening pulmonary edema. 2. Small bilateral pleural effusions, left greater than right, are thought to be similar to the prior exam. 3. Stable cardiomegaly.  Electronically Signed By-Demetria Jolley MD On:8/11/2021 8:29 AM This report was finalized on [id number] by  Demetria Jolley MD.              EKG              I personally viewed and interpreted the patient's EKG/Telemetry data: Atrial fibrillation  ECHOCARDIOGRAM:    Results for orders placed during the hospital encounter of 07/20/21    Adult Transthoracic Echo Complete With Contrast if  Necessary Per Protocol    Interpretation Summary  · Estimated left ventricular EF = 30% Left ventricular systolic function is moderately decreased.    Occasions  Shortness of breath.    Technically satisfactory study.  Mitral valve is structurally normal.  Mild mitral regurgitation.  Tricuspid valve is structurally normal.  Aortic valve is structurally normal.  Pulmonic valve could not be well visualized.  No evidence for tricuspid or aortic regurgitation is seen by Doppler study.  Biatrial and biventricular enlargement is present.  Diffuse left ventricular hypocontractility with ejection fraction of 30%.  Atrial septum is intact.  Aorta is normal.  No pericardial effusion or intracardiac thrombus is seen.    Impression  Mild mitral regurgitation.  Significant biatrial and biventricular enlargement.  Diffuse left ventricular hypocontractility with ejection fraction of 30%.  No pericardial effusion or intracardiac thrombus is seen.          STRESS MYOVIEW:    Cardiolite (Tc-99m Sestamibi) stress test    CARDIAC CATHETERIZATION:            OTHER:         Assessment/Plan     Principal Problem:    Atrial fibrillation with RVR (CMS/HCC)  Active Problems:    S/P CABG (coronary artery bypass graft)    Essential hypertension    Elevated troponin    ANNETTE treated with BiPAP    Mixed hyperlipidemia    Flaccid hemiplegia of right dominant side as late effect of cerebral infarction (CMS/HCC)    Diabetic neuropathy (CMS/HCC)    Unspecified dementia with behavioral disturbance (CMS/HCC)    Coronary artery disease    Chronic venous hypertension (idiopathic) with ulcer and inflammation of bilateral lower extremity (CMS/HCC)    Chronic obstructive pulmonary disease, unspecified (CMS/HCC)    Chronic foot ulcer (CMS/HCC)    CKD (chronic kidney disease) stage 3, GFR 30-59 ml/min (CMS/HCC)    Tobacco use disorder    Acute on chronic systolic heart failure (CMS/HCC)    Anemia of renal  disease      [[[[[[[[[[[[[[[[[[[[[[[[[    Impression  ==============  -Increased shortness of breath and palpitations     -Recent acute on chronic congestive heart failure.  Compensated at this time  Recent echocardiogram showed left ventricle dysfunction with ejection fraction of 35%.     -History of right-sided weakness with left MCA territory stroke.-Improved      -status post CABG 2005. status post stent to LAD 2009    Status post stent to LAD 11/13/2015  Status post stent to mid LAD and SVG to marginal branch 09/16/2016.  Status post stent to mid LAD 5/24/2021  Status post stent to SVG to RCA 5/26/2021     Cardiac catheterization 5/24/2021 revealed  Left ventricle angiogram was not performed due to pre-existing renal dysfunction.  Left main coronary artery normal.  Left anterior descending artery has mid segment lengthy 90% disease within the previously placed stent.  Distal left into descending artery has diffuse disease.  Circumflex coronary artery is totally occluded.  (Chronic)  Right coronary artery is chronically occluded.  SVG to marginal branch (jump graft to OM1 and OM 2) is totally occluded (new finding compared to 2015.  SVG to PDA has distal radiolucency.  However no definite obstructive disease is present.       -status post myocardial infarction 2000 and 2002 .      -history of intermittent atrial fibrillation-maintaining sinus rhythm     Transesophageal echocardiogram 11/30/2020.  Biatrial enlargement.  Smoking effect in the left atrium and left ventricle and left atrial appendage.  Mild mitral and aortic regurgitation.  Left ventricle enlargement with diffuse hypocontractility with ejection fraction of 35 to 40%.     Echocardiogram 11/27/2020 revealed left atrial enlargement left ventricle dysfunction with ejection fraction of 40%.  Negative bubble study.      -diabetes hypertension and sleep apnea in history of renal dysfunction .  Recent BUN/creatinine 38/1.36     -status post colon surgery  (partial colectomy) appendectomy cholecystectomy right 4th toe removal and carpal tunnel surgery      -continued smoking 1 pack per day -abstinence from smoking      -allergy to codeine.  ============   Plan  ================  Atrial fibrillation-chronic  Patient is off intravenous Cardizem.  Precordial has improved.  Continue Coreg and p.o. Cardizem and amiodarone 200 mg once a day.  Observe for any further episodes of bradycardia.  However last night patient's heart rate was faster and required Coreg doses given earlier this morning.    Troponin level is slightly elevated  0.11  0.41  No bump up in troponin.    Significant renal dysfunction-chronic  BUN/68/1.91-8/4/2021.  BUN 64 creatinine 1.95-8/2/2021  68/2.04-8/5/2021  67/2.35-8/9/2021  71/2.67-8/10/2021  77/2.93-8/12/2021  82/2.95-8/13/2021    Hyperkalemia  k 5.7  Patient is off spironolactone.  Patient is not on any potassium supplements.  Potassium is better at 4.7-8/12/2021  4.4-8/13/2021  Renal follow-up in progress.    Anemia  hgb 7.1  7-8/5/2021  7 PRBC  Receiving iron transfusion.  8.1-8/9/2021     Status post CABG  Patient is not having any angina pectoris  Status post stent to LAD 5/24/2021.  Status post stent to SVG to RCA 5/26/2021.       Anticoagulation  Patient is on Eliquis.    Medications were reviewed and updated.  Current medications include amiodarone 200 mg a day Eliquis aspirin atorvastatin Coreg Cardizem CD Lasix gabapentin hydralazine Imdur.    Patient's problems appear to be more pulmonary than cardiac at this time.    Further plan will depend on patient's progress.   ]]]]]]]]]]]]]]]]]]]]]]           Jose G Rm MD  08/13/21  06:47 EDT

## 2021-08-13 NOTE — PLAN OF CARE
Pt still requiring oxygen. No complaints throughout the night. Fall prevention maintained. Will continue to monitor.  Problem: Arrhythmia/Dysrhythmia  Goal: Normalized Cardiac Rhythm  Outcome: Ongoing, Progressing     Problem: Fall Injury Risk  Goal: Absence of Fall and Fall-Related Injury  Outcome: Ongoing, Progressing  Intervention: Identify and Manage Contributors to Fall Injury Risk  Recent Flowsheet Documentation  Taken 8/12/2021 2000 by Devin Del Real RN  Medication Review/Management: medications reviewed  Intervention: Promote Injury-Free Environment  Recent Flowsheet Documentation  Taken 8/13/2021 0202 by Devin Del Real RN  Safety Promotion/Fall Prevention:   assistive device/personal items within reach   clutter free environment maintained   fall prevention program maintained   gait belt   lighting adjusted   nonskid shoes/slippers when out of bed   room organization consistent   safety round/check completed  Taken 8/13/2021 0000 by Devin Del Real RN  Safety Promotion/Fall Prevention:   assistive device/personal items within reach   clutter free environment maintained   fall prevention program maintained   gait belt   lighting adjusted   nonskid shoes/slippers when out of bed   room organization consistent   safety round/check completed  Taken 8/12/2021 2200 by Devin Del Real RN  Safety Promotion/Fall Prevention:   assistive device/personal items within reach   clutter free environment maintained   fall prevention program maintained   gait belt   lighting adjusted   nonskid shoes/slippers when out of bed   room organization consistent   safety round/check completed  Taken 8/12/2021 2000 by Devin Del Real RN  Safety Promotion/Fall Prevention:   assistive device/personal items within reach   clutter free environment maintained   fall prevention program maintained   lighting adjusted   gait belt   nonskid shoes/slippers when out of bed   safety round/check completed   room organization consistent      Problem: Adult Inpatient Plan of Care  Goal: Plan of Care Review  Outcome: Ongoing, Progressing  Flowsheets (Taken 8/13/2021 0256)  Plan of Care Reviewed With: patient  Goal: Patient-Specific Goal (Individualized)  Outcome: Ongoing, Progressing  Goal: Absence of Hospital-Acquired Illness or Injury  Outcome: Ongoing, Progressing  Intervention: Identify and Manage Fall Risk  Recent Flowsheet Documentation  Taken 8/13/2021 0202 by Devin Del Real RN  Safety Promotion/Fall Prevention:   assistive device/personal items within reach   clutter free environment maintained   fall prevention program maintained   gait belt   lighting adjusted   nonskid shoes/slippers when out of bed   room organization consistent   safety round/check completed  Taken 8/13/2021 0000 by Devin Del Real RN  Safety Promotion/Fall Prevention:   assistive device/personal items within reach   clutter free environment maintained   fall prevention program maintained   gait belt   lighting adjusted   nonskid shoes/slippers when out of bed   room organization consistent   safety round/check completed  Taken 8/12/2021 2200 by Devin Del Real RN  Safety Promotion/Fall Prevention:   assistive device/personal items within reach   clutter free environment maintained   fall prevention program maintained   gait belt   lighting adjusted   nonskid shoes/slippers when out of bed   room organization consistent   safety round/check completed  Taken 8/12/2021 2000 by Devin Del Real RN  Safety Promotion/Fall Prevention:   assistive device/personal items within reach   clutter free environment maintained   fall prevention program maintained   lighting adjusted   gait belt   nonskid shoes/slippers when out of bed   safety round/check completed   room organization consistent  Intervention: Prevent Skin Injury  Recent Flowsheet Documentation  Taken 8/13/2021 0000 by Devin Del Real RN  Skin Protection:   tubing/devices free from skin contact   incontinence pads  utilized   adhesive use limited  Taken 8/12/2021 2000 by Devin Del Real RN  Skin Protection:   tubing/devices free from skin contact   incontinence pads utilized   adhesive use limited  Intervention: Prevent Infection  Recent Flowsheet Documentation  Taken 8/13/2021 0202 by Devin Del Real RN  Infection Prevention:   single patient room provided   rest/sleep promoted   personal protective equipment utilized   hand hygiene promoted  Taken 8/13/2021 0000 by Devin Del Real RN  Infection Prevention:   single patient room provided   rest/sleep promoted   personal protective equipment utilized   hand hygiene promoted  Taken 8/12/2021 2200 by Devin Del Real RN  Infection Prevention:   single patient room provided   rest/sleep promoted   personal protective equipment utilized   hand hygiene promoted  Taken 8/12/2021 2000 by Devin Del Real RN  Infection Prevention:   single patient room provided   hand hygiene promoted   personal protective equipment utilized  Goal: Optimal Comfort and Wellbeing  Outcome: Ongoing, Progressing  Intervention: Provide Person-Centered Care  Recent Flowsheet Documentation  Taken 8/12/2021 2000 by Devin Del Real RN  Trust Relationship/Rapport:   care explained   questions answered  Goal: Readiness for Transition of Care  Outcome: Ongoing, Progressing     Problem: Skin Injury Risk Increased  Goal: Skin Health and Integrity  Outcome: Ongoing, Progressing  Intervention: Optimize Skin Protection  Recent Flowsheet Documentation  Taken 8/13/2021 0000 by Devin Del Real RN  Pressure Reduction Techniques: frequent weight shift encouraged  Pressure Reduction Devices: pressure-redistributing mattress utilized  Skin Protection:   tubing/devices free from skin contact   incontinence pads utilized   adhesive use limited  Taken 8/12/2021 2000 by Devin Del Real RN  Pressure Reduction Techniques: frequent weight shift encouraged  Pressure Reduction Devices: pressure-redistributing mattress  utilized  Skin Protection:   tubing/devices free from skin contact   incontinence pads utilized   adhesive use limited   Goal Outcome Evaluation:  Plan of Care Reviewed With: patient

## 2021-08-13 NOTE — NURSING NOTE
Pt's HR has been in the 110s-120s for about 30 minutes. Spoke to Dr. Hackett about HR and he instructed to give 0800 coreg now.

## 2021-08-13 NOTE — PROGRESS NOTES
"RENAL/[facility]:     LOS: 9 days    Patient Care Team:  Samantha Zabala APRN as PCP - General  Samantha Zabala APRN as PCP - Family Medicine  Jose G Rm MD as Consulting Physician (Cardiology)    Chief Complaint:  Palpitations    Subjective     Interval History:   SOA still present, edema improved    Objective     Vital Sign Min/Max for last 24 hours  Temp  Min: 97.6 °F (36.4 °C)  Max: 99 °F (37.2 °C)   BP  Min: 104/50  Max: 141/74   Pulse  Min: 17  Max: 118   Resp  Min: 11  Max: 22   SpO2  Min: 91 %  Max: 99 %   Flow (L/min)  Min: 10  Max: 10   No data recorded     Flowsheet Rows      First Filed Value   Admission Height  180.3 cm (71\") Documented at 08/03/2021 1733   Admission Weight  113 kg (250 lb) Documented at 08/03/2021 1733          I/O this shift:  In: 222 [P.O.:222]  Out: -   I/O last 3 completed shifts:  In: 684 [P.O.:684]  Out: 1475 [Urine:1475]    Physical Exam:  GEN: Awake, NAD  ENT: PERRL, EOMI, MMM  NECK: Supple, no JVD  CHEST: CTAB, no W/R/C  CV: RRR, no M/G/R  ABD: Soft, NT, +BS  SKIN: Warm and Dry  NEURO: CN's intact      WBC WBC   Date Value Ref Range Status   08/13/2021 7.70 3.40 - 10.80 10*3/mm3 Final      HGB Hemoglobin   Date Value Ref Range Status   08/13/2021 7.5 (L) 13.0 - 17.7 g/dL Final      HCT Hematocrit   Date Value Ref Range Status   08/13/2021 22.1 (L) 37.5 - 51.0 % Final      Platlets No results found for: LABPLAT   MCV MCV   Date Value Ref Range Status   08/13/2021 87.1 79.0 - 97.0 fL Final          Sodium Sodium   Date Value Ref Range Status   08/13/2021 133 (L) 136 - 145 mmol/L Final   08/12/2021 132 (L) 136 - 145 mmol/L Final   08/11/2021 136 136 - 145 mmol/L Final      Potassium Potassium   Date Value Ref Range Status   08/13/2021 4.3 3.5 - 5.2 mmol/L Final   08/13/2021 4.4 3.5 - 5.2 mmol/L Final   08/13/2021 4.4 3.5 - 5.2 mmol/L Final   08/12/2021 4.7 3.5 - 5.2 mmol/L Final   08/12/2021 4.7 3.5 - 5.2 mmol/L Final   08/12/2021 4.6 3.5 - 5.2 mmol/L Final   08/11/2021 5.1 3.5 - " 5.2 mmol/L Final   08/11/2021 4.8 3.5 - 5.2 mmol/L Final   08/11/2021 5.4 (H) 3.5 - 5.2 mmol/L Final     Comment:     Slight hemolysis detected by analyzer. Results may be affected.   08/11/2021 5.4 (H) 3.5 - 5.2 mmol/L Final     Comment:     Slight hemolysis detected by analyzer. Results may be affected.   08/10/2021 5.9 (H) 3.5 - 5.2 mmol/L Final   08/10/2021 5.5 (H) 3.5 - 5.2 mmol/L Final   08/10/2021 5.3 (H) 3.5 - 5.2 mmol/L Final      Chloride Chloride   Date Value Ref Range Status   08/13/2021 89 (L) 98 - 107 mmol/L Final   08/12/2021 89 (L) 98 - 107 mmol/L Final   08/11/2021 92 (L) 98 - 107 mmol/L Final      CO2 CO2   Date Value Ref Range Status   08/13/2021 33.0 (H) 22.0 - 29.0 mmol/L Final   08/12/2021 33.0 (H) 22.0 - 29.0 mmol/L Final   08/11/2021 34.0 (H) 22.0 - 29.0 mmol/L Final      BUN BUN   Date Value Ref Range Status   08/13/2021 82 (H) 8 - 23 mg/dL Final   08/12/2021 77 (H) 8 - 23 mg/dL Final   08/11/2021 72 (H) 8 - 23 mg/dL Final      Creatinine Creatinine   Date Value Ref Range Status   08/13/2021 2.95 (H) 0.76 - 1.27 mg/dL Final   08/12/2021 2.93 (H) 0.76 - 1.27 mg/dL Final   08/11/2021 2.81 (H) 0.76 - 1.27 mg/dL Final      Calcium Calcium   Date Value Ref Range Status   08/13/2021 8.3 (L) 8.6 - 10.5 mg/dL Final   08/12/2021 8.4 (L) 8.6 - 10.5 mg/dL Final   08/11/2021 8.9 8.6 - 10.5 mg/dL Final      PO4 No results found for: CAPO4   Albumin Albumin   Date Value Ref Range Status   08/13/2021 3.60 3.50 - 5.20 g/dL Final   08/12/2021 3.60 3.50 - 5.20 g/dL Final   08/11/2021 3.70 3.50 - 5.20 g/dL Final      Magnesium Magnesium   Date Value Ref Range Status   08/13/2021 2.2 1.6 - 2.4 mg/dL Final      Uric Acid No results found for: URICACID        Results Review:     I reviewed the patient's new clinical results.    amiodarone, 200 mg, Oral, Q12H  apixaban, 5 mg, Oral, Q12H  aspirin, 81 mg, Oral, Daily  atorvastatin, 40 mg, Oral, Nightly  carvedilol, 6.25 mg, Oral, BID With Meals  cholecalciferol,  1,000 Units, Oral, Daily  dilTIAZem CD, 240 mg, Oral, Q24H  donepezil, 10 mg, Oral, Nightly  DULoxetine, 60 mg, Oral, Daily  epoetin izabella-epbx, 20,000 Units, Subcutaneous, Weekly  furosemide, 80 mg, Intravenous, BID  hydrALAZINE, 50 mg, Oral, Q12H  insulin lispro, 0-9 Units, Subcutaneous, TID AC  ipratropium-albuterol, 3 mL, Nebulization, 4x Daily - RT  isosorbide mononitrate, 60 mg, Oral, BID - Nitrates  metOLazone, 5 mg, Oral, Daily  sodium chloride, 10 mL, Intravenous, Q12H  vitamin B-12, 1,000 mcg, Oral, Daily           Medication Review: Reviewed    Assessment/Plan       Atrial fibrillation with RVR (CMS/[facility])    S/P CABG (coronary artery bypass graft)    Essential hypertension    Elevated troponin    ANNETTE treated with BiPAP    Mixed hyperlipidemia    Flaccid hemiplegia of right dominant side as late effect of cerebral infarction (CMS/[facility])    Diabetic neuropathy (CMS/[facility])    Unspecified dementia with behavioral disturbance (CMS/[facility])    Coronary artery disease    Chronic venous hypertension (idiopathic) with ulcer and inflammation of bilateral lower extremity (CMS/[facility])    Chronic obstructive pulmonary disease, unspecified (CMS/[facility])    Chronic foot ulcer (CMS/[facility])    CKD (chronic kidney disease) stage 3, GFR 30-59 ml/min (CMS/[facility])    Tobacco use disorder    Acute on chronic systolic heart failure (CMS/[facility])    Anemia of renal disease      1. DILEEP  2. CKD III  3. Hyperkalemia  4. Pulmonary edema  5. Tachycardia  6. CHF  7. T2DM  8. Hypertension  9. Anemia     PLAN:  Cr now 2.9.  Continue IV Lasix and PO Metolazone.  Likely switch to PO Lasix in next day or so.  Also discussed possibility of acute HD if renal function and volume status worsens.  EPO today.  Will follow.  Reassess in AM.       Ynoy Bhat M.D.  [facility]

## 2021-08-13 NOTE — PROGRESS NOTES
Nutrition Services    Patient Name: Benson Mclean  YOB: 1950  MRN: [id number]  Admission date: 8/3/2021    PPE Documentation        PPE Worn By Provider mask, gloves and eye protection   PPE Worn By Patient  none     NUTRITION SCREENING      Encounter Information: 8/13/21: Visited patient in room for LOS. Patient eating well per I/O, pt states he is getting tired of the same meal options. Discussed other items patient could order as Ala cart[employer]. Patient's wife on speaker phone and able to speak with her as well. Reports prior po intake is variable. Sometimes doesn't eat well at the nursing home- has been offered supplements there. Will order Boost GC 1x daily at this time due to fluid restriction. Weight history discussed with wife, states patient has a history of fluid retention and weight loss in past was due to fluid loss.    NFPE completed. Some muscle loss is noted to BLE, though patient is mostly in his W/C.       PO Diet: Diet Cardiac, Diabetic/Consistent Carbs; Healthy Heart; Diabetic - Consistent Carb; Fluid Restriction; 1500cc/day   PO Supplements: -    PO Intake:  -75% x6 meals       Labs (reviewed below): -Low Na, elev BUN/Creat, Ca low, Phos elev        GI Function:  -BM 8/10 (pt had call light on and stated he needed to have a BM)       Skin: -no breakdown       Weight Hx Review: -Weight is down 8% in 5 months, wife states this is fluid related       Nutrition Intervention: Continue CC/HH diet  Add Boost GC 1x daily  Follow for full assessment     Results from last 7 days   Lab Units 08/13/21  0734 08/13/21  0321 08/12/21  0432 08/11/21  1253 08/11/21  0745   SODIUM mmol/L  --  133* 132*  --  136   POTASSIUM mmol/L 4.3 4.4  4.4 4.7  4.7   < > 5.4*  5.4*   CHLORIDE mmol/L  --  89* 89*  --  92*   CO2 mmol/L  --  33.0* 33.0*  --  34.0*   BUN mg/dL  --  82* 77*  --  72*   CREATININE mg/dL  --  2.95* 2.93*  --  2.81*   CALCIUM mg/dL  --  8.3* 8.4*  --  8.9   GLUCOSE mg/dL  --  162* 161*   --  183*    < > = values in this interval not displayed.     Results from last 7 days   Lab Units 08/13/21  0321   MAGNESIUM mg/dL 2.2   PHOSPHORUS mg/dL 5.3*   HEMOGLOBIN g/dL 7.5*   HEMATOCRIT % 22.1*     COVID19   Date Value Ref Range Status   08/03/2021 Not Detected Not Detected - Ref. Range Final     Lab Results   Component Value Date    HGBA1C 7.4 (H) 07/21/2021       RD to follow up per protocol.    Electronically signed by:  Tonia Wheeler RD  08/13/21 16:21 EDT

## 2021-08-13 NOTE — PLAN OF CARE
Goal Outcome Evaluation:      Pt. Demonstrates improved functional mobility this date w/ min A x 2 SPS transfer bed to armchair, progress limited secondary to impacts to activity tolerance and ADL participation. Recommend IP rehab at d/c.

## 2021-08-14 ENCOUNTER — APPOINTMENT (OUTPATIENT)
Dept: GENERAL RADIOLOGY | Facility: HOSPITAL | Age: 71
End: 2021-08-14

## 2021-08-14 LAB
ALBUMIN SERPL-MCNC: 3.8 G/DL (ref 3.5–5.2)
ANION GAP SERPL CALCULATED.3IONS-SCNC: 13 MMOL/L (ref 5–15)
APTT PPP: 33.4 SECONDS (ref 24–31)
ARTERIAL PATENCY WRIST A: POSITIVE
ATMOSPHERIC PRESS: ABNORMAL MM[HG]
BASE EXCESS BLDA CALC-SCNC: 5.5 MMOL/L (ref 0–3)
BASE EXCESS BLDA CALC-SCNC: 7.2 MMOL/L (ref 0–3)
BASE EXCESS BLDA CALC-SCNC: 7.6 MMOL/L (ref 0–3)
BASE EXCESS BLDA CALC-SCNC: 9 MMOL/L (ref 0–3)
BASOPHILS # BLD AUTO: 0.1 10*3/MM3 (ref 0–0.2)
BASOPHILS NFR BLD AUTO: 0.7 % (ref 0–1.5)
BDY SITE: ABNORMAL
BUN SERPL-MCNC: 87 MG/DL (ref 8–23)
BUN/CREAT SERPL: 31.9 (ref 7–25)
CA-I BLDA-SCNC: 0.93 MMOL/L (ref 1.15–1.33)
CA-I BLDA-SCNC: 1 MMOL/L (ref 1.15–1.33)
CALCIUM SPEC-SCNC: 9 MG/DL (ref 8.6–10.5)
CHLORIDE SERPL-SCNC: 88 MMOL/L (ref 98–107)
CO2 BLDA-SCNC: 35.6 MMOL/L (ref 22–29)
CO2 BLDA-SCNC: 36.7 MMOL/L (ref 22–29)
CO2 BLDA-SCNC: 37.4 MMOL/L (ref 22–29)
CO2 BLDA-SCNC: 38.4 MMOL/L (ref 22–29)
CO2 SERPL-SCNC: 33 MMOL/L (ref 22–29)
CREAT SERPL-MCNC: 2.73 MG/DL (ref 0.76–1.27)
D-LACTATE SERPL-SCNC: 1.5 MMOL/L (ref 0.5–2)
D-LACTATE SERPL-SCNC: 1.5 MMOL/L (ref 0.5–2)
DEPRECATED RDW RBC AUTO: 49.9 FL (ref 37–54)
EOSINOPHIL # BLD AUTO: 0.2 10*3/MM3 (ref 0–0.4)
EOSINOPHIL NFR BLD AUTO: 2.3 % (ref 0.3–6.2)
ERYTHROCYTE [DISTWIDTH] IN BLOOD BY AUTOMATED COUNT: 16.3 % (ref 12.3–15.4)
GFR SERPL CREATININE-BSD FRML MDRD: 23 ML/MIN/1.73
GLUCOSE BLDC GLUCOMTR-MCNC: 164 MG/DL (ref 70–105)
GLUCOSE BLDC GLUCOMTR-MCNC: 192 MG/DL (ref 70–105)
GLUCOSE BLDC GLUCOMTR-MCNC: 196 MG/DL (ref 70–105)
GLUCOSE BLDC GLUCOMTR-MCNC: 206 MG/DL (ref 70–105)
GLUCOSE BLDC GLUCOMTR-MCNC: 245 MG/DL (ref 74–100)
GLUCOSE BLDC GLUCOMTR-MCNC: 245 MG/DL (ref 74–100)
GLUCOSE BLDC GLUCOMTR-MCNC: 263 MG/DL (ref 74–100)
GLUCOSE BLDC GLUCOMTR-MCNC: 263 MG/DL (ref 74–100)
GLUCOSE SERPL-MCNC: 182 MG/DL (ref 65–99)
HBV SURFACE AG SERPL QL IA: NORMAL
HCO3 BLDA-SCNC: 34.1 MMOL/L (ref 21–28)
HCO3 BLDA-SCNC: 34.3 MMOL/L (ref 21–28)
HCO3 BLDA-SCNC: 35.2 MMOL/L (ref 21–28)
HCO3 BLDA-SCNC: 36 MMOL/L (ref 21–28)
HCT VFR BLD AUTO: 24.3 % (ref 37.5–51)
HCT VFR BLDA CALC: 25 % (ref 38–51)
HCT VFR BLDA CALC: 31 % (ref 38–51)
HEMODILUTION: NO
HGB BLD-MCNC: 8.1 G/DL (ref 13–17.7)
HGB BLDA-MCNC: 10.7 G/DL (ref 12–17)
HGB BLDA-MCNC: 8.3 G/DL (ref 12–17)
INHALED O2 CONCENTRATION: 100 %
INHALED O2 CONCENTRATION: 100 %
INHALED O2 CONCENTRATION: 70 %
INHALED O2 CONCENTRATION: 85 %
INR PPP: 1.17 (ref 0.93–1.1)
LYMPHOCYTES # BLD AUTO: 2 10*3/MM3 (ref 0.7–3.1)
LYMPHOCYTES NFR BLD AUTO: 18.9 % (ref 19.6–45.3)
MAGNESIUM SERPL-MCNC: 2.2 MG/DL (ref 1.6–2.4)
MCH RBC QN AUTO: 28.9 PG (ref 26.6–33)
MCHC RBC AUTO-ENTMCNC: 33.2 G/DL (ref 31.5–35.7)
MCV RBC AUTO: 86.9 FL (ref 79–97)
MODALITY: ABNORMAL
MONOCYTES # BLD AUTO: 1.3 10*3/MM3 (ref 0.1–0.9)
MONOCYTES NFR BLD AUTO: 12.9 % (ref 5–12)
NEUTROPHILS NFR BLD AUTO: 6.7 10*3/MM3 (ref 1.7–7)
NEUTROPHILS NFR BLD AUTO: 65.2 % (ref 42.7–76)
NRBC BLD AUTO-RTO: 0 /100 WBC (ref 0–0.2)
PCO2 BLDA: 49.7 MM HG (ref 35–48)
PCO2 BLDA: 72.5 MM HG (ref 35–48)
PCO2 BLDA: 75.5 MM HG (ref 35–48)
PCO2 BLDA: 78.3 MM HG (ref 35–48)
PEEP RESPIRATORY: 9 CM[H2O]
PEEP RESPIRATORY: 9 CM[H2O]
PH BLDA: 7.27 PH UNITS (ref 7.35–7.45)
PH BLDA: 7.27 PH UNITS (ref 7.35–7.45)
PH BLDA: 7.29 PH UNITS (ref 7.35–7.45)
PH BLDA: 7.44 PH UNITS (ref 7.35–7.45)
PHOSPHATE SERPL-MCNC: 5.4 MG/DL (ref 2.5–4.5)
PLATELET # BLD AUTO: 309 10*3/MM3 (ref 140–450)
PMV BLD AUTO: 7.7 FL (ref 6–12)
PO2 BLDA: 173.2 MM HG (ref 83–108)
PO2 BLDA: 193.8 MM HG (ref 83–108)
PO2 BLDA: 50.4 MM HG (ref 83–108)
PO2 BLDA: 97.8 MM HG (ref 83–108)
POTASSIUM BLDA-SCNC: 3.9 MMOL/L (ref 3.5–4.5)
POTASSIUM BLDA-SCNC: 4.2 MMOL/L (ref 3.5–4.5)
POTASSIUM SERPL-SCNC: 4.3 MMOL/L (ref 3.5–5.2)
POTASSIUM SERPL-SCNC: 4.5 MMOL/L (ref 3.5–5.2)
PROTHROMBIN TIME: 12.8 SECONDS (ref 9.6–11.7)
RBC # BLD AUTO: 2.79 10*6/MM3 (ref 4.14–5.8)
RESPIRATORY RATE: 20
RESPIRATORY RATE: 20
SAO2 % BLDCOA: 77.7 % (ref 94–98)
SAO2 % BLDCOA: 96 % (ref 94–98)
SAO2 % BLDCOA: 99.5 % (ref 94–98)
SAO2 % BLDCOA: 99.6 % (ref 94–98)
SODIUM BLD-SCNC: 131 MMOL/L (ref 138–146)
SODIUM BLD-SCNC: 131 MMOL/L (ref 138–146)
SODIUM SERPL-SCNC: 134 MMOL/L (ref 136–145)
VENTILATOR MODE: ABNORMAL
VENTILATOR MODE: ABNORMAL
VT ON VENT VENT: 500 ML
VT ON VENT VENT: 500 ML
WBC # BLD AUTO: 10.3 10*3/MM3 (ref 3.4–10.8)

## 2021-08-14 PROCEDURE — 84132 ASSAY OF SERUM POTASSIUM: CPT | Performed by: INTERNAL MEDICINE

## 2021-08-14 PROCEDURE — 25010000002 CALCIUM GLUCONATE 2-0.675 GM/100ML-% SOLUTION: Performed by: INTERNAL MEDICINE

## 2021-08-14 PROCEDURE — 82962 GLUCOSE BLOOD TEST: CPT

## 2021-08-14 PROCEDURE — 80069 RENAL FUNCTION PANEL: CPT | Performed by: INTERNAL MEDICINE

## 2021-08-14 PROCEDURE — 82330 ASSAY OF CALCIUM: CPT

## 2021-08-14 PROCEDURE — 25010000002 MANNITOL PER 50 ML: Performed by: INTERNAL MEDICINE

## 2021-08-14 PROCEDURE — 82803 BLOOD GASES ANY COMBINATION: CPT

## 2021-08-14 PROCEDURE — 87340 HEPATITIS B SURFACE AG IA: CPT | Performed by: INTERNAL MEDICINE

## 2021-08-14 PROCEDURE — 63710000001 INSULIN LISPRO (HUMAN) PER 5 UNITS: Performed by: INTERNAL MEDICINE

## 2021-08-14 PROCEDURE — 36600 WITHDRAWAL OF ARTERIAL BLOOD: CPT

## 2021-08-14 PROCEDURE — 71045 X-RAY EXAM CHEST 1 VIEW: CPT

## 2021-08-14 PROCEDURE — 85730 THROMBOPLASTIN TIME PARTIAL: CPT | Performed by: NURSE PRACTITIONER

## 2021-08-14 PROCEDURE — 83605 ASSAY OF LACTIC ACID: CPT

## 2021-08-14 PROCEDURE — 02HV33Z INSERTION OF INFUSION DEVICE INTO SUPERIOR VENA CAVA, PERCUTANEOUS APPROACH: ICD-10-PCS | Performed by: INTERNAL MEDICINE

## 2021-08-14 PROCEDURE — 25010000002 FUROSEMIDE PER 20 MG: Performed by: NURSE PRACTITIONER

## 2021-08-14 PROCEDURE — 94799 UNLISTED PULMONARY SVC/PX: CPT

## 2021-08-14 PROCEDURE — 85610 PROTHROMBIN TIME: CPT | Performed by: NURSE PRACTITIONER

## 2021-08-14 PROCEDURE — 85018 HEMOGLOBIN: CPT

## 2021-08-14 PROCEDURE — 94660 CPAP INITIATION&MGMT: CPT

## 2021-08-14 PROCEDURE — 80051 ELECTROLYTE PANEL: CPT

## 2021-08-14 PROCEDURE — 85025 COMPLETE CBC W/AUTO DIFF WBC: CPT | Performed by: INTERNAL MEDICINE

## 2021-08-14 PROCEDURE — 83735 ASSAY OF MAGNESIUM: CPT | Performed by: INTERNAL MEDICINE

## 2021-08-14 RX ORDER — ALBUMIN (HUMAN) 12.5 G/50ML
12.5 SOLUTION INTRAVENOUS AS NEEDED
Status: ACTIVE | OUTPATIENT
Start: 2021-08-14 | End: 2021-08-15

## 2021-08-14 RX ORDER — CALCIUM GLUCONATE 20 MG/ML
2 INJECTION, SOLUTION INTRAVENOUS ONCE
Status: COMPLETED | OUTPATIENT
Start: 2021-08-14 | End: 2021-08-14

## 2021-08-14 RX ORDER — INSULIN LISPRO 100 [IU]/ML
0-14 INJECTION, SOLUTION INTRAVENOUS; SUBCUTANEOUS AS NEEDED
Status: DISCONTINUED | OUTPATIENT
Start: 2021-08-14 | End: 2021-08-19

## 2021-08-14 RX ORDER — HEPARIN SODIUM 1000 [USP'U]/ML
4000 INJECTION, SOLUTION INTRAVENOUS; SUBCUTANEOUS AS NEEDED
Status: DISCONTINUED | OUTPATIENT
Start: 2021-08-14 | End: 2021-08-25 | Stop reason: HOSPADM

## 2021-08-14 RX ORDER — MANNITOL 250 MG/ML
25 INJECTION, SOLUTION INTRAVENOUS AS NEEDED
Status: COMPLETED | OUTPATIENT
Start: 2021-08-14 | End: 2021-08-14

## 2021-08-14 RX ORDER — BUMETANIDE 0.25 MG/ML
1 INJECTION, SOLUTION INTRAMUSCULAR; INTRAVENOUS CONTINUOUS
Status: DISCONTINUED | OUTPATIENT
Start: 2021-08-14 | End: 2021-08-16

## 2021-08-14 RX ORDER — METOPROLOL TARTRATE 5 MG/5ML
5 INJECTION INTRAVENOUS ONCE
Status: COMPLETED | OUTPATIENT
Start: 2021-08-14 | End: 2021-08-14

## 2021-08-14 RX ORDER — INSULIN LISPRO 100 [IU]/ML
0-14 INJECTION, SOLUTION INTRAVENOUS; SUBCUTANEOUS
Status: DISCONTINUED | OUTPATIENT
Start: 2021-08-14 | End: 2021-08-19

## 2021-08-14 RX ORDER — FUROSEMIDE 10 MG/ML
40 INJECTION INTRAMUSCULAR; INTRAVENOUS ONCE
Status: COMPLETED | OUTPATIENT
Start: 2021-08-14 | End: 2021-08-14

## 2021-08-14 RX ORDER — METOPROLOL TARTRATE 5 MG/5ML
INJECTION INTRAVENOUS
Status: COMPLETED
Start: 2021-08-14 | End: 2021-08-14

## 2021-08-14 RX ADMIN — HYDRALAZINE HYDROCHLORIDE 50 MG: 25 TABLET, FILM COATED ORAL at 10:16

## 2021-08-14 RX ADMIN — DONEPEZIL HYDROCHLORIDE 10 MG: 5 TABLET, FILM COATED ORAL at 22:00

## 2021-08-14 RX ADMIN — BUMETANIDE 1 MG/HR: 0.25 INJECTION INTRAMUSCULAR; INTRAVENOUS at 12:05

## 2021-08-14 RX ADMIN — Medication 10 ML: at 22:00

## 2021-08-14 RX ADMIN — DILTIAZEM HYDROCHLORIDE 240 MG: 240 CAPSULE, COATED, EXTENDED RELEASE ORAL at 10:15

## 2021-08-14 RX ADMIN — METOROPROLOL TARTRATE 5 MG: 5 INJECTION, SOLUTION INTRAVENOUS at 04:54

## 2021-08-14 RX ADMIN — MANNITOL 25 G: 12.5 INJECTION, SOLUTION INTRAVENOUS at 22:45

## 2021-08-14 RX ADMIN — METOPROLOL TARTRATE 5 MG: 5 INJECTION INTRAVENOUS at 04:54

## 2021-08-14 RX ADMIN — IPRATROPIUM BROMIDE AND ALBUTEROL SULFATE 3 ML: 2.5; .5 SOLUTION RESPIRATORY (INHALATION) at 19:48

## 2021-08-14 RX ADMIN — METOLAZONE 5 MG: 5 TABLET ORAL at 10:15

## 2021-08-14 RX ADMIN — CYANOCOBALAMIN TAB 1000 MCG 1000 MCG: 1000 TAB at 10:17

## 2021-08-14 RX ADMIN — FUROSEMIDE 40 MG: 10 INJECTION, SOLUTION INTRAMUSCULAR; INTRAVENOUS at 05:26

## 2021-08-14 RX ADMIN — CALCIUM GLUCONATE 2 G: 20 INJECTION, SOLUTION INTRAVENOUS at 10:34

## 2021-08-14 RX ADMIN — AMIODARONE HYDROCHLORIDE 200 MG: 200 TABLET ORAL at 10:17

## 2021-08-14 RX ADMIN — AMIODARONE HYDROCHLORIDE 200 MG: 200 TABLET ORAL at 22:00

## 2021-08-14 RX ADMIN — MANNITOL 25 G: 12.5 INJECTION, SOLUTION INTRAVENOUS at 23:12

## 2021-08-14 RX ADMIN — MANNITOL 25 G: 12.5 INJECTION, SOLUTION INTRAVENOUS at 21:53

## 2021-08-14 RX ADMIN — Medication 1000 UNITS: at 10:16

## 2021-08-14 RX ADMIN — CARVEDILOL 6.25 MG: 6.25 TABLET, FILM COATED ORAL at 10:16

## 2021-08-14 RX ADMIN — INSULIN LISPRO 3 UNITS: 100 INJECTION, SOLUTION INTRAVENOUS; SUBCUTANEOUS at 17:40

## 2021-08-14 RX ADMIN — ATORVASTATIN CALCIUM 40 MG: 40 TABLET, FILM COATED ORAL at 22:00

## 2021-08-14 RX ADMIN — DULOXETINE 60 MG: 30 CAPSULE, DELAYED RELEASE ORAL at 10:16

## 2021-08-14 RX ADMIN — IPRATROPIUM BROMIDE AND ALBUTEROL SULFATE 3 ML: 2.5; .5 SOLUTION RESPIRATORY (INHALATION) at 15:28

## 2021-08-14 RX ADMIN — MANNITOL 12.5 G: 12.5 INJECTION, SOLUTION INTRAVENOUS at 20:20

## 2021-08-14 RX ADMIN — Medication 10 ML: at 10:17

## 2021-08-14 RX ADMIN — IPRATROPIUM BROMIDE AND ALBUTEROL SULFATE 3 ML: 2.5; .5 SOLUTION RESPIRATORY (INHALATION) at 11:28

## 2021-08-14 RX ADMIN — IPRATROPIUM BROMIDE AND ALBUTEROL SULFATE 3 ML: 2.5; .5 SOLUTION RESPIRATORY (INHALATION) at 06:36

## 2021-08-14 RX ADMIN — APIXABAN 5 MG: 5 TABLET, FILM COATED ORAL at 22:00

## 2021-08-14 NOTE — PLAN OF CARE
Pt was having a hard time breathing, O2 was declining as well. Pt seemed to be abdominally breathing and sweating. Rapid response was called around 0415. Wife was notified of condition via telephone. Fall prevention maintained. Encouraged to self turn. Will continue to monitor.  Problem: Arrhythmia/Dysrhythmia  Goal: Normalized Cardiac Rhythm  Outcome: Ongoing, Progressing     Problem: Fall Injury Risk  Goal: Absence of Fall and Fall-Related Injury  Outcome: Ongoing, Progressing  Intervention: Identify and Manage Contributors to Fall Injury Risk  Recent Flowsheet Documentation  Taken 8/13/2021 2000 by Devin Del Real RN  Medication Review/Management: medications reviewed  Intervention: Promote Injury-Free Environment  Recent Flowsheet Documentation  Taken 8/14/2021 0400 by Devin Del Real RN  Safety Promotion/Fall Prevention:   assistive device/personal items within reach   clutter free environment maintained   fall prevention program maintained   gait belt   lighting adjusted   nonskid shoes/slippers when out of bed   safety round/check completed   room organization consistent  Taken 8/14/2021 0200 by Devin Del Real RN  Safety Promotion/Fall Prevention:   assistive device/personal items within reach   clutter free environment maintained   fall prevention program maintained   gait belt   lighting adjusted   nonskid shoes/slippers when out of bed   room organization consistent   safety round/check completed  Taken 8/14/2021 0003 by Devin Del Real RN  Safety Promotion/Fall Prevention:   assistive device/personal items within reach   clutter free environment maintained   fall prevention program maintained   gait belt   lighting adjusted   nonskid shoes/slippers when out of bed   room organization consistent   safety round/check completed  Taken 8/13/2021 2200 by Devin Del Real RN  Safety Promotion/Fall Prevention:   assistive device/personal items within reach   clutter free environment maintained   fall  prevention program maintained   gait belt   lighting adjusted   nonskid shoes/slippers when out of bed   room organization consistent   safety round/check completed  Taken 8/13/2021 2000 by Devin Del Real RN  Safety Promotion/Fall Prevention:   assistive device/personal items within reach   clutter free environment maintained   fall prevention program maintained   gait belt   lighting adjusted   nonskid shoes/slippers when out of bed   room organization consistent   safety round/check completed     Problem: Adult Inpatient Plan of Care  Goal: Plan of Care Review  Outcome: Ongoing, Progressing  Flowsheets (Taken 8/14/2021 0454)  Plan of Care Reviewed With: patient  Goal: Patient-Specific Goal (Individualized)  Outcome: Ongoing, Progressing  Goal: Absence of Hospital-Acquired Illness or Injury  Outcome: Ongoing, Progressing  Intervention: Identify and Manage Fall Risk  Recent Flowsheet Documentation  Taken 8/14/2021 0400 by Devin Del Real RN  Safety Promotion/Fall Prevention:   assistive device/personal items within reach   clutter free environment maintained   fall prevention program maintained   gait belt   lighting adjusted   nonskid shoes/slippers when out of bed   safety round/check completed   room organization consistent  Taken 8/14/2021 0200 by Devin Del Real RN  Safety Promotion/Fall Prevention:   assistive device/personal items within reach   clutter free environment maintained   fall prevention program maintained   gait belt   lighting adjusted   nonskid shoes/slippers when out of bed   room organization consistent   safety round/check completed  Taken 8/14/2021 0003 by Devin Del Real RN  Safety Promotion/Fall Prevention:   assistive device/personal items within reach   clutter free environment maintained   fall prevention program maintained   gait belt   lighting adjusted   nonskid shoes/slippers when out of bed   room organization consistent   safety round/check completed  Taken 8/13/2021 2200 by  Del Real, Kurstyn, RN  Safety Promotion/Fall Prevention:   assistive device/personal items within reach   clutter free environment maintained   fall prevention program maintained   gait belt   lighting adjusted   nonskid shoes/slippers when out of bed   room organization consistent   safety round/check completed  Taken 8/13/2021 2000 by Devin Del Real RN  Safety Promotion/Fall Prevention:   assistive device/personal items within reach   clutter free environment maintained   fall prevention program maintained   gait belt   lighting adjusted   nonskid shoes/slippers when out of bed   room organization consistent   safety round/check completed  Intervention: Prevent Skin Injury  Recent Flowsheet Documentation  Taken 8/13/2021 2000 by Devin Del Real RN  Skin Protection:   tubing/devices free from skin contact   incontinence pads utilized   adhesive use limited  Intervention: Prevent Infection  Recent Flowsheet Documentation  Taken 8/14/2021 0400 by Devin Del Real RN  Infection Prevention:   single patient room provided   rest/sleep promoted   personal protective equipment utilized   hand hygiene promoted  Taken 8/14/2021 0200 by Devin Del Real RN  Infection Prevention:   single patient room provided   rest/sleep promoted   personal protective equipment utilized   hand hygiene promoted  Taken 8/14/2021 0003 by Devin Del Real RN  Infection Prevention:   single patient room provided   rest/sleep promoted   personal protective equipment utilized   hand hygiene promoted  Taken 8/13/2021 2200 by Devin Del Real RN  Infection Prevention:   single patient room provided   rest/sleep promoted   personal protective equipment utilized   hand hygiene promoted  Taken 8/13/2021 2000 by Devin Del Real RN  Infection Prevention:   single patient room provided   personal protective equipment utilized   hand hygiene promoted  Goal: Optimal Comfort and Wellbeing  Outcome: Ongoing, Progressing  Intervention: Provide  Person-Centered Care  Recent Flowsheet Documentation  Taken 8/13/2021 2000 by Devin Del Real RN  Trust Relationship/Rapport:   care explained   questions answered  Goal: Readiness for Transition of Care  Outcome: Ongoing, Progressing     Problem: Skin Injury Risk Increased  Goal: Skin Health and Integrity  Outcome: Ongoing, Progressing  Intervention: Optimize Skin Protection  Recent Flowsheet Documentation  Taken 8/14/2021 0003 by Devin Del Real RN  Pressure Reduction Techniques:   frequent weight shift encouraged   weight shift assistance provided  Taken 8/13/2021 2000 by Devin Del Real RN  Pressure Reduction Techniques: frequent weight shift encouraged  Pressure Reduction Devices: pressure-redistributing mattress utilized  Skin Protection:   tubing/devices free from skin contact   incontinence pads utilized   adhesive use limited   Goal Outcome Evaluation:  Plan of Care Reviewed With: patient

## 2021-08-14 NOTE — SIGNIFICANT NOTE
Patient having acute resp distress earlier rapid response called initially felt he required intubation but now stable on AVAPS.

## 2021-08-14 NOTE — NURSING NOTE
Rapid response called due to pt having a hard time breathing, being diaphoretic, abdominally breathing, and O2 sats in the 80s. See all orders and MAR. Now on AVAPS. Will having some trouble breathing, but O2 has increased to high 90s. Will continue to monitor.

## 2021-08-14 NOTE — SIGNIFICANT NOTE
Responded to rapid response with ICU charge nurse.  Rapid response was called for acute respiratory distress, patient on BiPAP.  Primary RN stated that they had called hospitalist NP for respiratory distress, with no new orders.  Rapid response was called.  Patient was diaphoretic with increased work of breathing.  ABG was ordered with increased CO2 and acidosis.  Patient placed on AVAPS will obtain repeat ABG in 1 hour.  Stat CXR also ordered and patient given additional dose of IV Lasix for pulmonary edema.  IV Lopressor given for increased heart rate.  Patient not requiring intubation at this time.    Electronically signed by FABIOLA Fisher, 08/14/21, 6:49 AM EDT.

## 2021-08-14 NOTE — PROCEDURES
Central Venous dialysis catheter Placement  Date: 8/14/2021  Time: 1700    Indication: Temporary hemodialysis    Attending: Dr. Álvarez  A time-out was completed verifying correct patient, procedure, site, positioning. The patient was placed in a dependent position appropriate for central line placement based on the vein to be cannulated. The patient’s right IJ vein was identified via ultrasound and the right lateral neck was prepped and draped in sterile fashion. 1% Lidocaine was used to anesthetize the surrounding skin area and subcutaneous tissue.  An 18-gauge needle was then introduced to the vein under ultrasound guidance at which time dark red, nonpulsatile blood was visualized.  A guidewire was then passed smoothly through the needle and the needle removed.  The vein skin and subcutaneous tissue and dilated with use of the supplied dilator.  A large bore dialysis catheter was introduced into the right IJ vein using the Seldinger technique. The catheter was threaded smoothly over the guide wire and then the wire was removed and visualized to be intact.  Immediate return of dark red, nonpulsatile blood was obtained from each port which were then flushed with 10 mL of sterile saline. The catheter was then sutured in place to the skin and a sterile dressing applied.     Estimated Blood Loss: 3 mL  The patient tolerated the procedure well and there were no complications.     Chest x-ray pending to verify placement and assess for pneumothorax

## 2021-08-14 NOTE — SIGNIFICANT NOTE
"Repeat ABG obtained on AVAPS, with little change.  Primary RN reports the patient is still diaphoretic and although states that he feels better, seems to still be using accessory muscles for breathing.  Patient heart rate has improved with IV Lopressor.  Will transfer patient to ICU for closer monitoring.  Patient may require Shiley placement for CRRT to pull off excess fluids, per nephrology note.    Primary RN states that she spoke with patient's wife and explained to her what was going on including rapid response and possible need for intubation.  States that wife said that they want to do \"everything\".    Electronically signed by FABIOLA Fisher, 08/14/21, 6:51 AM EDT.    "

## 2021-08-14 NOTE — PLAN OF CARE
Goal Outcome Evaluation:              Outcome Summary: Patient was a fastcall, transfered to ICU for increased O2 demands. He on AVAPS. Shiley placed for HD today. Patient has been confused. VSS Will continue to monitor.

## 2021-08-14 NOTE — CODE DOCUMENTATION
FABIOLA Chopra arrival with this nurse.  Primary RN stated that patients work of breathing is worse.  Patient on ipap at 100% and a non-rebreather underneath.  Accessory muscle in use.

## 2021-08-14 NOTE — PROGRESS NOTES
"PULMONARY CRITICAL CARE Progress  NOTE      PATIENT IDENTIFICATION:  Name: Benson Mclean  MRN: [id number]  :  1950     Age: 70 y.o.  Sex: male    DATE OF Note:  2021   Referring Physician: Jose Guadalupe Corea DO                  Subjective:   -Events noted, patient moved to ICU overnight for increased O2 needs  -Appears volume overloaded, aggressive diuresis with Bumex drip per nephrology  -Currently on continuous AVAPS  -Patient reports being tired but hungry  -No new skin issues      Objective:  tMax 24 hrs: Temp (24hrs), Av.7 °F (36.5 °C), Min:96.5 °F (35.8 °C), Max:98.4 °F (36.9 °C)      Vitals Ranges:   Temp:  [96.5 °F (35.8 °C)-98.4 °F (36.9 °C)] 96.5 °F (35.8 °C)  Heart Rate:  [] 93  Resp:  [11-24] 11  BP: (104-162)/(47-83) 132/65    Intake and Output Last 3 Shifts:   I/O last 3 completed shifts:  In: 222 [P.O.:222]  Out: 1375 [Urine:1375]    Exam:  /65 (BP Location: Left arm, Patient Position: Lying)   Pulse 93   Temp 96.5 °F (35.8 °C) (Axillary)   Resp 11   Ht 170.2 cm (67.01\")   Wt 113 kg (248 lb 3.8 oz)   SpO2 100%   BMI 38.87 kg/m²     General Appearance:     HEENT:  Normocephalic, without obvious abnormality, Conjunctiva/corneas clear.  Normal external ear canals, Nares normal, no drainage     Neck:  Supple, symmetrical, trachea midline. No JVD.  Lungs /Chest wall: Scattered bilateral rhonchi, no wheezing, bibasilar crackles, respirations unlabored symmetrical wall movement.     Heart: Irregularly irregular, atrial fibrillation, +DEON, no rub or gallop  Abdomen: Soft, non-tender, active bowel sounds  Extremities: Trace BLE edema, no clubbing or cyanosis        Medications:    Current Facility-Administered Medications:   •  acetaminophen (TYLENOL) tablet 650 mg, 650 mg, Oral, Q4H PRN, 650 mg at 21 1713 **OR** acetaminophen (TYLENOL) 160 MG/5ML solution 650 mg, 650 mg, Oral, Q4H PRN **OR** acetaminophen (TYLENOL) suppository 650 mg, 650 mg, Rectal, Q4H PRN, " Rayne Bah APRN  •  aluminum-magnesium hydroxide-simethicone (MAALOX MAX) 400-400-40 MG/5ML suspension 15 mL, 15 mL, Oral, Q6H PRN, Rayne Bah APRN  •  amiodarone (PACERONE) tablet 200 mg, 200 mg, Oral, Q12H, Jose G Rm MD, 200 mg at 08/13/21 2017  •  apixaban (ELIQUIS) tablet 5 mg, 5 mg, Oral, Q12H, Jose G Rm MD, 5 mg at 08/13/21 2018  •  aspirin chewable tablet 81 mg, 81 mg, Oral, Daily, Rayne Bah APRN, 81 mg at 08/13/21 1025  •  atorvastatin (LIPITOR) tablet 40 mg, 40 mg, Oral, Nightly, Rayne Bah APRN, 40 mg at 08/13/21 2018  •  carvedilol (COREG) tablet 6.25 mg, 6.25 mg, Oral, BID With Meals, Jose G Rm MD, 6.25 mg at 08/13/21 0603  •  cholecalciferol (VITAMIN D3) tablet 1,000 Units, 1,000 Units, Oral, Daily, Rayne Bah APRN, 1,000 Units at 08/13/21 1025  •  dextrose (D50W) 25 g/ 50mL Intravenous Solution 25 g, 25 g, Intravenous, Q15 Min PRN, Stanton Junior MD  •  dextrose (GLUTOSE) oral gel 15 g, 15 g, Oral, Q15 Min PRN, Stanton Junior MD  •  dilTIAZem CD (CARDIZEM CD) 24 hr capsule 240 mg, 240 mg, Oral, Q24H, Jose G Rm MD, 240 mg at 08/13/21 1026  •  donepezil (ARICEPT) tablet 10 mg, 10 mg, Oral, Nightly, Rayne Bah APRN, 10 mg at 08/13/21 2018  •  DULoxetine (CYMBALTA) DR capsule 60 mg, 60 mg, Oral, Daily, Rayne Bah APRN, 60 mg at 08/13/21 1026  •  epoetin izabella-epbx (RETACRIT) injection 20,000 Units, 20,000 Units, Subcutaneous, Weekly, Yony Bhat MD, 20,000 Units at 08/13/21 2131  •  furosemide (LASIX) injection 80 mg, 80 mg, Intravenous, BID, Yony Bhat MD, 80 mg at 08/13/21 2017  •  glucagon (human recombinant) (GLUCAGEN DIAGNOSTIC) injection 1 mg, 1 mg, Subcutaneous, Q15 Min PRN, Stanton Junior MD  •  hydrALAZINE (APRESOLINE) tablet 50 mg, 50 mg, Oral, Q12H, Rayne Bah, APRN, 50 mg at 08/13/21 2021  •  insulin lispro (ADMELOG) injection 0-14 Units, 0-14 Units, Subcutaneous, TID AC **AND** insulin lispro (ADMELOG)  injection 0-14 Units, 0-14 Units, Subcutaneous, PRN, Marcelo Álvarez MD  •  ipratropium-albuterol (DUO-NEB) nebulizer solution 3 mL, 3 mL, Nebulization, 4x Daily - RT, Rayne Bah APRN, 3 mL at 08/14/21 0636  •  isosorbide mononitrate (IMDUR) 24 hr tablet 60 mg, 60 mg, Oral, BID - Nitrates, Rayne Bah APRN, 60 mg at 08/12/21 0942  •  melatonin tablet 5 mg, 5 mg, Oral, Nightly PRN, Rayne Bah APRN  •  metOLazone (ZAROXOLYN) tablet 5 mg, 5 mg, Oral, Daily, Katerina Beyer MD, 5 mg at 08/13/21 1025  •  nitroglycerin (NITROSTAT) SL tablet 0.4 mg, 0.4 mg, Sublingual, Q5 Min PRN, Rayne Bah APRN  •  ondansetron (ZOFRAN) tablet 4 mg, 4 mg, Oral, Q6H PRN **OR** ondansetron (ZOFRAN) injection 4 mg, 4 mg, Intravenous, Q6H PRN, Rayne Bah APRN  •  [COMPLETED] Insert peripheral IV, , , Once **AND** sodium chloride 0.9 % flush 10 mL, 10 mL, Intravenous, PRN, Davis Zavala MD  •  sodium chloride 0.9 % flush 10 mL, 10 mL, Intravenous, Q12H, Rayne Bah APRN, 10 mL at 08/13/21 2016  •  sodium chloride 0.9 % flush 10 mL, 10 mL, Intravenous, PRN, Rayne Bah APRN  •  vitamin B-12 (CYANOCOBALAMIN) tablet 1,000 mcg, 1,000 mcg, Oral, Daily, Rayne Bah APRN, 1,000 mcg at 08/13/21 1026    Data Review:  All labs (24hrs):   Recent Results (from the past 24 hour(s))   POC Glucose Once    Collection Time: 08/13/21 11:44 AM    Specimen: Blood   Result Value Ref Range    Glucose 226 (H) 70 - 105 mg/dL   POC Glucose Once    Collection Time: 08/13/21  5:04 PM    Specimen: Blood   Result Value Ref Range    Glucose 243 (H) 70 - 105 mg/dL   POC Glucose Once    Collection Time: 08/13/21  7:50 PM    Specimen: Blood   Result Value Ref Range    Glucose 212 (H) 70 - 105 mg/dL   Renal Function Panel    Collection Time: 08/14/21  4:12 AM    Specimen: Blood   Result Value Ref Range    Glucose 182 (H) 65 - 99 mg/dL    BUN 87 (H) 8 - 23 mg/dL    Creatinine 2.73 (H) 0.76 - 1.27 mg/dL    Sodium 134 (L) 136 - 145 mmol/L     Potassium 4.5 3.5 - 5.2 mmol/L    Chloride 88 (L) 98 - 107 mmol/L    CO2 33.0 (H) 22.0 - 29.0 mmol/L    Calcium 9.0 8.6 - 10.5 mg/dL    Albumin 3.80 3.50 - 5.20 g/dL    Phosphorus 5.4 (H) 2.5 - 4.5 mg/dL    Anion Gap 13.0 5.0 - 15.0 mmol/L    BUN/Creatinine Ratio 31.9 (H) 7.0 - 25.0    eGFR Non African Amer 23 (L) >60 mL/min/1.73   Magnesium    Collection Time: 08/14/21  4:12 AM    Specimen: Blood   Result Value Ref Range    Magnesium 2.2 1.6 - 2.4 mg/dL   CBC Auto Differential    Collection Time: 08/14/21  4:12 AM    Specimen: Blood   Result Value Ref Range    WBC 10.30 3.40 - 10.80 10*3/mm3    RBC 2.79 (L) 4.14 - 5.80 10*6/mm3    Hemoglobin 8.1 (L) 13.0 - 17.7 g/dL    Hematocrit 24.3 (L) 37.5 - 51.0 %    MCV 86.9 79.0 - 97.0 fL    MCH 28.9 26.6 - 33.0 pg    MCHC 33.2 31.5 - 35.7 g/dL    RDW 16.3 (H) 12.3 - 15.4 %    RDW-SD 49.9 37.0 - 54.0 fl    MPV 7.7 6.0 - 12.0 fL    Platelets 309 140 - 450 10*3/mm3    Neutrophil % 65.2 42.7 - 76.0 %    Lymphocyte % 18.9 (L) 19.6 - 45.3 %    Monocyte % 12.9 (H) 5.0 - 12.0 %    Eosinophil % 2.3 0.3 - 6.2 %    Basophil % 0.7 0.0 - 1.5 %    Neutrophils, Absolute 6.70 1.70 - 7.00 10*3/mm3    Lymphocytes, Absolute 2.00 0.70 - 3.10 10*3/mm3    Monocytes, Absolute 1.30 (H) 0.10 - 0.90 10*3/mm3    Eosinophils, Absolute 0.20 0.00 - 0.40 10*3/mm3    Basophils, Absolute 0.10 0.00 - 0.20 10*3/mm3    nRBC 0.0 0.0 - 0.2 /100 WBC   Blood Gas, Arterial -    Collection Time: 08/14/21  4:32 AM    Specimen: Arterial Blood   Result Value Ref Range    Site Right Brachial     Fitz's Test Positive     pH, Arterial 7.270 (L) 7.350 - 7.450 pH units    pCO2, Arterial 78.3 (C) 35.0 - 48.0 mm Hg    pO2, Arterial 50.4 (L) 83.0 - 108.0 mm Hg    HCO3, Arterial 36.0 (H) 21.0 - 28.0 mmol/L    Base Excess, Arterial 7.6 (H) 0.0 - 3.0 mmol/L    O2 Saturation, Arterial 77.7 (L) 94.0 - 98.0 %    CO2 Content 38.4 (H) 22 - 29 mmol/L    Barometric Pressure for Blood Gas      Modality BiPap     FIO2 100 %     Hemodilution No    POCT Electrolytes +HGB +HCT    Collection Time: 08/14/21  4:32 AM    Specimen: Blood   Result Value Ref Range    Sodium 131 (L) 138 - 146 mmol/L    POC Potassium 4.2 3.5 - 4.5 mmol/L    Ionized Calcium 0.93 (L) 1.15 - 1.33 mmol/L    Glucose 245 (H) 74 - 100 mg/dL    Hematocrit 25 (L) 38 - 51 %    Hemoglobin 8.3 (L) 12.0 - 17.0 g/dL   POC Lactate    Collection Time: 08/14/21  4:32 AM    Specimen: Blood   Result Value Ref Range    Lactate 1.5 0.5 - 2.0 mmol/L   POC Glucose Once    Collection Time: 08/14/21  4:32 AM    Specimen: Blood   Result Value Ref Range    Glucose 245 (H) 74 - 100 mg/dL   Blood Gas, Arterial -    Collection Time: 08/14/21  4:40 AM    Specimen: Arterial Blood   Result Value Ref Range    Site Left Radial     Fitz's Test Positive     pH, Arterial 7.266 (L) 7.350 - 7.450 pH units    pCO2, Arterial 75.5 (C) 35.0 - 48.0 mm Hg    pO2, Arterial 97.8 83.0 - 108.0 mm Hg    HCO3, Arterial 34.3 (H) 21.0 - 28.0 mmol/L    Base Excess, Arterial 5.5 (H) 0.0 - 3.0 mmol/L    O2 Saturation, Arterial 96.0 94.0 - 98.0 %    CO2 Content 36.7 (H) 22 - 29 mmol/L    Barometric Pressure for Blood Gas      Modality BiPap     FIO2 100 %    Ventilator Mode ;NIV     Hemodilution No    POCT Electrolytes +HGB +HCT    Collection Time: 08/14/21  4:40 AM    Specimen: Blood   Result Value Ref Range    Sodium 131 (L) 138 - 146 mmol/L    POC Potassium 3.9 3.5 - 4.5 mmol/L    Ionized Calcium 1.00 (L) 1.15 - 1.33 mmol/L    Glucose 263 (H) 74 - 100 mg/dL    Hematocrit 31 (L) 38 - 51 %    Hemoglobin 10.7 (L) 12.0 - 17.0 g/dL   POC Lactate    Collection Time: 08/14/21  4:40 AM    Specimen: Blood   Result Value Ref Range    Lactate 1.5 0.5 - 2.0 mmol/L   POC Glucose Once    Collection Time: 08/14/21  4:40 AM    Specimen: Blood   Result Value Ref Range    Glucose 263 (H) 74 - 100 mg/dL   Protime-INR    Collection Time: 08/14/21  4:41 AM    Specimen: Blood   Result Value Ref Range    Protime 12.8 (H) 9.6 - 11.7 Seconds     INR 1.17 (H) 0.93 - 1.10   aPTT    Collection Time: 08/14/21  4:41 AM    Specimen: Blood   Result Value Ref Range    PTT 33.4 (H) 24.0 - 31.0 seconds   POC Glucose Once    Collection Time: 08/14/21  7:26 AM    Specimen: Blood   Result Value Ref Range    Glucose 206 (H) 70 - 105 mg/dL   Potassium    Collection Time: 08/14/21  8:00 AM    Specimen: Blood   Result Value Ref Range    Potassium 4.3 3.5 - 5.2 mmol/L        Imaging:  XR Chest 1 View  Narrative: Chest one view.    DATE: 8/14/2021.    COMPARISON: 8/11/2021.    CLINICAL HISTORY: Hypoxia.    FINDINGS:    There is worsening interstitial and airspace opacity seen throughout the lungs bilaterally which is likely related to worsening pulmonary edema.    There is likely a small left and small-to-moderate right pleural effusion.    The cardiac silhouette is enlarged.    There are midline sternotomy wires.  Impression: Worsening pulmonary edema and bilateral effusions.    Electronically signed by:  Sal Nieto D.O.    8/14/2021 3:10 AM       ASSESSMENT:  Pneumonia  Bilateral pleural effusion  Atrial fibrillation with RVR (CMS/HCC)    S/P CABG (coronary artery bypass graft)    Essential hypertension    Elevated troponin    ANNETTE treated with BiPAP    Mixed hyperlipidemia    Flaccid hemiplegia of right dominant side as late effect of cerebral infarction (CMS/HCC)    Diabetic neuropathy (CMS/HCC)    Unspecified dementia with behavioral disturbance (CMS/HCC)    Coronary artery disease    Chronic venous hypertension (idiopathic) with ulcer and inflammation of bilateral lower extremity (CMS/HCC)    Chronic obstructive pulmonary disease, unspecified (CMS/HCC)    Chronic foot ulcer (CMS/HCC)    CKD (chronic kidney disease) stage 3, GFR 30-59 ml/min (CMS/HCC)    Tobacco use disorder    Acute on chronic systolic heart failure (CMS/HCC)    Anemia of renal disease     PLAN:  O2 support titrated to maintain a saturation of 88 to 91%  Currently on continuous AVAPS  Continue  anticoagulation for now  Aggressive diuresis per nephrology  Bronchodilator  Inhaled corticosteroids  Electrolytes/ glycemic control  DVT and GI prophylaxis.       8/14/2021  09:38 EDT     I personally have examined  and interviewed the patient. I have reviewed the history, data, problems, assessment and plan with our NP.  Critical care time in direct medical management ( 32  ) minutes  Electronically signed by Marcelo Álvarez MD, D,ABS, 08/15/21, 12:10 AM EDT.

## 2021-08-14 NOTE — PROGRESS NOTES
"RENAL/KCC:     LOS: 10 days    Patient Care Team:  Samantha Zabala APRN as PCP - General  Samantha Zabala APRN as PCP - Family Medicine  Jose G Rm MD as Consulting Physician (Cardiology)    Chief Complaint:  Palpitations    Subjective     Interval History:   Patient was seen and examined in the ICU, transferred to critical care due to worsening hypoxemia worsening oxygenation  Currently on BiPAP, high flow O2  Bumex drip was started this morning but no significant urine output since starting this medication  Patient agrees to hemodialysis to help with fluid removal      Objective     Vital Sign Min/Max for last 24 hours  Temp  Min: 96.5 °F (35.8 °C)  Max: 98.4 °F (36.9 °C)   BP  Min: 104/47  Max: 162/62   Pulse  Min: 76  Max: 115   Resp  Min: 11  Max: 24   SpO2  Min: 93 %  Max: 100 %   Flow (L/min)  Min: 11  Max: 35   Weight  Min: 113 kg (248 lb 3.8 oz)  Max: 113 kg (248 lb 3.8 oz)     Flowsheet Rows      First Filed Value   Admission Height  180.3 cm (71\") Documented at 08/03/2021 1733   Admission Weight  113 kg (250 lb) Documented at 08/03/2021 1733          I/O this shift:  In: -   Out: 400 [Urine:400]  I/O last 3 completed shifts:  In: 222 [P.O.:222]  Out: 1375 [Urine:1375]    Physical Exam:  GEN: Awake, NAD, on BiPAP, ill-appearing  ENT: PERRL, EOMI, MMM  NECK: Supple, no JVD  CHEST: CTAB, no W/R/C  CV: RRR, no M/G/R  ABD: Soft, NT, +BS  SKIN: Warm and Dry, 1-2+ edema  NEURO: CN's intact      WBC WBC   Date Value Ref Range Status   08/14/2021 10.30 3.40 - 10.80 10*3/mm3 Final   08/13/2021 7.70 3.40 - 10.80 10*3/mm3 Final      HGB Hemoglobin   Date Value Ref Range Status   08/14/2021 10.7 (L) 12.0 - 17.0 g/dL Final   08/14/2021 8.3 (L) 12.0 - 17.0 g/dL Final   08/14/2021 8.1 (L) 13.0 - 17.7 g/dL Final   08/13/2021 7.5 (L) 13.0 - 17.7 g/dL Final      HCT Hematocrit   Date Value Ref Range Status   08/14/2021 31 (L) 38 - 51 % Final   08/14/2021 25 (L) 38 - 51 % Final   08/14/2021 24.3 (L) 37.5 - 51.0 % Final "   08/13/2021 22.1 (L) 37.5 - 51.0 % Final      Platlets No results found for: LABPLAT   MCV MCV   Date Value Ref Range Status   08/14/2021 86.9 79.0 - 97.0 fL Final   08/13/2021 87.1 79.0 - 97.0 fL Final          Sodium Sodium   Date Value Ref Range Status   08/14/2021 134 (L) 136 - 145 mmol/L Final   08/13/2021 133 (L) 136 - 145 mmol/L Final   08/12/2021 132 (L) 136 - 145 mmol/L Final      Potassium Potassium   Date Value Ref Range Status   08/14/2021 4.3 3.5 - 5.2 mmol/L Final   08/14/2021 4.5 3.5 - 5.2 mmol/L Final   08/13/2021 4.3 3.5 - 5.2 mmol/L Final   08/13/2021 4.4 3.5 - 5.2 mmol/L Final   08/13/2021 4.4 3.5 - 5.2 mmol/L Final   08/12/2021 4.7 3.5 - 5.2 mmol/L Final   08/12/2021 4.7 3.5 - 5.2 mmol/L Final   08/12/2021 4.6 3.5 - 5.2 mmol/L Final   08/11/2021 5.1 3.5 - 5.2 mmol/L Final      Chloride Chloride   Date Value Ref Range Status   08/14/2021 88 (L) 98 - 107 mmol/L Final   08/13/2021 89 (L) 98 - 107 mmol/L Final   08/12/2021 89 (L) 98 - 107 mmol/L Final      CO2 CO2   Date Value Ref Range Status   08/14/2021 33.0 (H) 22.0 - 29.0 mmol/L Final   08/13/2021 33.0 (H) 22.0 - 29.0 mmol/L Final   08/12/2021 33.0 (H) 22.0 - 29.0 mmol/L Final      BUN BUN   Date Value Ref Range Status   08/14/2021 87 (H) 8 - 23 mg/dL Final   08/13/2021 82 (H) 8 - 23 mg/dL Final   08/12/2021 77 (H) 8 - 23 mg/dL Final      Creatinine Creatinine   Date Value Ref Range Status   08/14/2021 2.73 (H) 0.76 - 1.27 mg/dL Final   08/13/2021 2.95 (H) 0.76 - 1.27 mg/dL Final   08/12/2021 2.93 (H) 0.76 - 1.27 mg/dL Final      Calcium Calcium   Date Value Ref Range Status   08/14/2021 9.0 8.6 - 10.5 mg/dL Final   08/13/2021 8.3 (L) 8.6 - 10.5 mg/dL Final   08/12/2021 8.4 (L) 8.6 - 10.5 mg/dL Final      PO4 No results found for: CAPO4   Albumin Albumin   Date Value Ref Range Status   08/14/2021 3.80 3.50 - 5.20 g/dL Final   08/13/2021 3.60 3.50 - 5.20 g/dL Final   08/12/2021 3.60 3.50 - 5.20 g/dL Final      Magnesium Magnesium   Date Value  Ref Range Status   08/14/2021 2.2 1.6 - 2.4 mg/dL Final   08/13/2021 2.2 1.6 - 2.4 mg/dL Final      Uric Acid No results found for: URICACID        Results Review:     I reviewed the patient's new clinical results.    amiodarone, 200 mg, Oral, Q12H  apixaban, 5 mg, Oral, Q12H  aspirin, 81 mg, Oral, Daily  atorvastatin, 40 mg, Oral, Nightly  carvedilol, 6.25 mg, Oral, BID With Meals  cholecalciferol, 1,000 Units, Oral, Daily  dilTIAZem CD, 240 mg, Oral, Q24H  donepezil, 10 mg, Oral, Nightly  DULoxetine, 60 mg, Oral, Daily  epoetin izabella-epbx, 20,000 Units, Subcutaneous, Weekly  hydrALAZINE, 50 mg, Oral, Q12H  insulin lispro, 0-14 Units, Subcutaneous, TID AC  ipratropium-albuterol, 3 mL, Nebulization, 4x Daily - RT  isosorbide mononitrate, 60 mg, Oral, BID - Nitrates  metOLazone, 5 mg, Oral, Daily  sodium chloride, 10 mL, Intravenous, Q12H  vitamin B-12, 1,000 mcg, Oral, Daily      bumetanide, 1 mg/hr, Last Rate: 1 mg/hr (08/14/21 1205)        Medication Review: Reviewed    Assessment/Plan       Atrial fibrillation with RVR (CMS/[facility])    S/P CABG (coronary artery bypass graft)    Essential hypertension    Elevated troponin    ANNETTE treated with BiPAP    Mixed hyperlipidemia    Flaccid hemiplegia of right dominant side as late effect of cerebral infarction (CMS/[facility])    Diabetic neuropathy (CMS/[facility])    Unspecified dementia with behavioral disturbance (CMS/[facility])    Coronary artery disease    Chronic venous hypertension (idiopathic) with ulcer and inflammation of bilateral lower extremity (CMS/[facility])    Chronic obstructive pulmonary disease, unspecified (CMS/[facility])    Chronic foot ulcer (CMS/[facility])    CKD (chronic kidney disease) stage 3, GFR 30-59 ml/min (CMS/[facility])    Tobacco use disorder    Acute on chronic systolic heart failure (CMS/[facility])    Anemia of renal disease      1. DILEEP, worsening, oliguric  2. CKD III  3. Hyperkalemia  4. Pulmonary edema  5. Tachycardia  6. CHF  7. T2DM  8. Hypertension  9. Anemia  10.  Worsening hypoxemic  respiratory failure on BiPAP     PLAN:  Minimal urine output so far today despite IV Bumex drip and oral metolazone  Remains fluid overloaded with worsening renal failure in need of hemodialysis  Will place Shiley catheter at bedside  Standard HD later today with UF as tolerated  Albumin and mannitol with HD to assist with fluid removal  If he does not tolerate standard HD well we will transition to continuous therapy        Crow Vences M.D.  [facility]

## 2021-08-14 NOTE — CODE DOCUMENTATION
FABIOLA Chopra, ok to remain in room.  Repeat ABG in 1 hour, orders placed.  Spoke with patient about the next steps.  It was explained that if the AVAPs does not work we will have to intubated.  Patient stated he didn't care.

## 2021-08-15 LAB
ALBUMIN SERPL-MCNC: 3.5 G/DL (ref 3.5–5.2)
ANION GAP SERPL CALCULATED.3IONS-SCNC: 10 MMOL/L (ref 5–15)
BUN SERPL-MCNC: 45 MG/DL (ref 8–23)
BUN/CREAT SERPL: 26.5 (ref 7–25)
CALCIUM SPEC-SCNC: 8.4 MG/DL (ref 8.6–10.5)
CHLORIDE SERPL-SCNC: 91 MMOL/L (ref 98–107)
CO2 SERPL-SCNC: 32 MMOL/L (ref 22–29)
CREAT SERPL-MCNC: 1.7 MG/DL (ref 0.76–1.27)
GFR SERPL CREATININE-BSD FRML MDRD: 40 ML/MIN/1.73
GLUCOSE BLDC GLUCOMTR-MCNC: 167 MG/DL (ref 70–105)
GLUCOSE BLDC GLUCOMTR-MCNC: 193 MG/DL (ref 70–105)
GLUCOSE BLDC GLUCOMTR-MCNC: 243 MG/DL (ref 70–105)
GLUCOSE BLDC GLUCOMTR-MCNC: 252 MG/DL (ref 70–105)
GLUCOSE SERPL-MCNC: 149 MG/DL (ref 65–99)
PHOSPHATE SERPL-MCNC: 2.7 MG/DL (ref 2.5–4.5)
POTASSIUM SERPL-SCNC: 3.4 MMOL/L (ref 3.5–5.2)
SODIUM SERPL-SCNC: 133 MMOL/L (ref 136–145)

## 2021-08-15 PROCEDURE — 25010000002 ONDANSETRON PER 1 MG: Performed by: NURSE PRACTITIONER

## 2021-08-15 PROCEDURE — 94799 UNLISTED PULMONARY SVC/PX: CPT

## 2021-08-15 PROCEDURE — 80069 RENAL FUNCTION PANEL: CPT | Performed by: INTERNAL MEDICINE

## 2021-08-15 PROCEDURE — 63710000001 INSULIN LISPRO (HUMAN) PER 5 UNITS: Performed by: INTERNAL MEDICINE

## 2021-08-15 PROCEDURE — 94660 CPAP INITIATION&MGMT: CPT

## 2021-08-15 PROCEDURE — 82962 GLUCOSE BLOOD TEST: CPT

## 2021-08-15 PROCEDURE — 25010000002 HEPARIN (PORCINE) PER 1000 UNITS: Performed by: INTERNAL MEDICINE

## 2021-08-15 PROCEDURE — 5A1D70Z PERFORMANCE OF URINARY FILTRATION, INTERMITTENT, LESS THAN 6 HOURS PER DAY: ICD-10-PCS | Performed by: INTERNAL MEDICINE

## 2021-08-15 RX ORDER — BUDESONIDE 0.5 MG/2ML
0.5 INHALANT ORAL
Status: DISCONTINUED | OUTPATIENT
Start: 2021-08-15 | End: 2021-08-25 | Stop reason: HOSPADM

## 2021-08-15 RX ORDER — IPRATROPIUM BROMIDE AND ALBUTEROL SULFATE 2.5; .5 MG/3ML; MG/3ML
3 SOLUTION RESPIRATORY (INHALATION)
Status: DISCONTINUED | OUTPATIENT
Start: 2021-08-15 | End: 2021-08-25 | Stop reason: HOSPADM

## 2021-08-15 RX ADMIN — IPRATROPIUM BROMIDE AND ALBUTEROL SULFATE 3 ML: 2.5; .5 SOLUTION RESPIRATORY (INHALATION) at 08:50

## 2021-08-15 RX ADMIN — ASPIRIN 81 MG: 81 TABLET, CHEWABLE ORAL at 08:49

## 2021-08-15 RX ADMIN — Medication 10 ML: at 20:59

## 2021-08-15 RX ADMIN — Medication 1000 UNITS: at 08:49

## 2021-08-15 RX ADMIN — Medication 10 ML: at 08:50

## 2021-08-15 RX ADMIN — HEPARIN SODIUM 4000 UNITS: 1000 INJECTION INTRAVENOUS; SUBCUTANEOUS at 00:30

## 2021-08-15 RX ADMIN — DILTIAZEM HYDROCHLORIDE 240 MG: 240 CAPSULE, COATED, EXTENDED RELEASE ORAL at 08:49

## 2021-08-15 RX ADMIN — IPRATROPIUM BROMIDE AND ALBUTEROL SULFATE 3 ML: 2.5; .5 SOLUTION RESPIRATORY (INHALATION) at 12:10

## 2021-08-15 RX ADMIN — APIXABAN 5 MG: 5 TABLET, FILM COATED ORAL at 20:59

## 2021-08-15 RX ADMIN — IPRATROPIUM BROMIDE AND ALBUTEROL SULFATE 3 ML: 2.5; .5 SOLUTION RESPIRATORY (INHALATION) at 23:31

## 2021-08-15 RX ADMIN — CYANOCOBALAMIN TAB 1000 MCG 1000 MCG: 1000 TAB at 08:49

## 2021-08-15 RX ADMIN — ISOSORBIDE MONONITRATE 60 MG: 60 TABLET, EXTENDED RELEASE ORAL at 18:26

## 2021-08-15 RX ADMIN — ONDANSETRON 4 MG: 2 INJECTION INTRAMUSCULAR; INTRAVENOUS at 05:10

## 2021-08-15 RX ADMIN — BUDESONIDE 0.5 MG: 0.5 SUSPENSION RESPIRATORY (INHALATION) at 20:03

## 2021-08-15 RX ADMIN — ISOSORBIDE MONONITRATE 60 MG: 60 TABLET, EXTENDED RELEASE ORAL at 08:48

## 2021-08-15 RX ADMIN — DONEPEZIL HYDROCHLORIDE 10 MG: 5 TABLET, FILM COATED ORAL at 20:59

## 2021-08-15 RX ADMIN — IPRATROPIUM BROMIDE AND ALBUTEROL SULFATE 3 ML: 2.5; .5 SOLUTION RESPIRATORY (INHALATION) at 19:59

## 2021-08-15 RX ADMIN — METOLAZONE 5 MG: 5 TABLET ORAL at 08:48

## 2021-08-15 RX ADMIN — AMIODARONE HYDROCHLORIDE 200 MG: 200 TABLET ORAL at 20:59

## 2021-08-15 RX ADMIN — DULOXETINE 60 MG: 30 CAPSULE, DELAYED RELEASE ORAL at 08:48

## 2021-08-15 RX ADMIN — ATORVASTATIN CALCIUM 40 MG: 40 TABLET, FILM COATED ORAL at 20:59

## 2021-08-15 RX ADMIN — INSULIN LISPRO 3 UNITS: 100 INJECTION, SOLUTION INTRAVENOUS; SUBCUTANEOUS at 07:59

## 2021-08-15 RX ADMIN — IPRATROPIUM BROMIDE AND ALBUTEROL SULFATE 3 ML: 2.5; .5 SOLUTION RESPIRATORY (INHALATION) at 14:50

## 2021-08-15 RX ADMIN — AMIODARONE HYDROCHLORIDE 200 MG: 200 TABLET ORAL at 08:48

## 2021-08-15 RX ADMIN — HYDRALAZINE HYDROCHLORIDE 50 MG: 25 TABLET, FILM COATED ORAL at 08:48

## 2021-08-15 RX ADMIN — CARVEDILOL 6.25 MG: 6.25 TABLET, FILM COATED ORAL at 08:48

## 2021-08-15 RX ADMIN — CARVEDILOL 6.25 MG: 6.25 TABLET, FILM COATED ORAL at 18:26

## 2021-08-15 RX ADMIN — APIXABAN 5 MG: 5 TABLET, FILM COATED ORAL at 08:51

## 2021-08-15 RX ADMIN — INSULIN LISPRO 3 UNITS: 100 INJECTION, SOLUTION INTRAVENOUS; SUBCUTANEOUS at 18:26

## 2021-08-15 NOTE — PROGRESS NOTES
"RENAL/KCC:     LOS: 11 days    Patient Care Team:  Samantha Zabala APRN as PCP - General  Samantha Zabala APRN as PCP - Family Medicine  Jose G Rm MD as Consulting Physician (Cardiology)    Chief Complaint:  Palpitations    Subjective     Interval History:   Patient was seen and examined in the ICU, transferred to critical care due to worsening hypoxemia on 8/13  Off BiPAP, remains on high flow O2  Tolerated initial dialysis well yesterday  Feels a little better this morning but still short of breath      Objective     Vital Sign Min/Max for last 24 hours  Temp  Min: 96.2 °F (35.7 °C)  Max: 97.9 °F (36.6 °C)   BP  Min: 80/49  Max: 149/83   Pulse  Min: 67  Max: 114   Resp  Min: 18  Max: 26   SpO2  Min: 0 %  Max: 100 %   Flow (L/min)  Min: 35  Max: 40   No data recorded     Flowsheet Rows      First Filed Value   Admission Height  180.3 cm (71\") Documented at 08/03/2021 1733   Admission Weight  113 kg (250 lb) Documented at 08/03/2021 1733          I/O this shift:  In: 240 [P.O.:240]  Out: -   I/O last 3 completed shifts:  In: 610 [P.O.:480; I.V.:98; IV Piggyback:32]  Out: 2475 [Urine:1375; Other:1100]    Physical Exam:  GEN: Awake, NAD, on BiPAP, ill-appearing  ENT: PERRL, EOMI, MMM  NECK: Supple, no JVD  CHEST: CTAB, no W/R/C  CV: RRR, no M/G/R  ABD: Soft, NT, +BS  SKIN: Warm and Dry, 1-2+ edema  NEURO: CN's intact      WBC WBC   Date Value Ref Range Status   08/14/2021 10.30 3.40 - 10.80 10*3/mm3 Final   08/13/2021 7.70 3.40 - 10.80 10*3/mm3 Final      HGB Hemoglobin   Date Value Ref Range Status   08/14/2021 10.7 (L) 12.0 - 17.0 g/dL Final   08/14/2021 8.3 (L) 12.0 - 17.0 g/dL Final   08/14/2021 8.1 (L) 13.0 - 17.7 g/dL Final   08/13/2021 7.5 (L) 13.0 - 17.7 g/dL Final      HCT Hematocrit   Date Value Ref Range Status   08/14/2021 31 (L) 38 - 51 % Final   08/14/2021 25 (L) 38 - 51 % Final   08/14/2021 24.3 (L) 37.5 - 51.0 % Final   08/13/2021 22.1 (L) 37.5 - 51.0 % Final      Platlets No results found for: " LABPLAT   MCV MCV   Date Value Ref Range Status   08/14/2021 86.9 79.0 - 97.0 fL Final   08/13/2021 87.1 79.0 - 97.0 fL Final          Sodium Sodium   Date Value Ref Range Status   08/15/2021 133 (L) 136 - 145 mmol/L Final   08/14/2021 134 (L) 136 - 145 mmol/L Final   08/13/2021 133 (L) 136 - 145 mmol/L Final      Potassium Potassium   Date Value Ref Range Status   08/15/2021 3.4 (L) 3.5 - 5.2 mmol/L Final   08/14/2021 4.3 3.5 - 5.2 mmol/L Final   08/14/2021 4.5 3.5 - 5.2 mmol/L Final   08/13/2021 4.3 3.5 - 5.2 mmol/L Final   08/13/2021 4.4 3.5 - 5.2 mmol/L Final   08/13/2021 4.4 3.5 - 5.2 mmol/L Final      Chloride Chloride   Date Value Ref Range Status   08/15/2021 91 (L) 98 - 107 mmol/L Final   08/14/2021 88 (L) 98 - 107 mmol/L Final   08/13/2021 89 (L) 98 - 107 mmol/L Final      CO2 CO2   Date Value Ref Range Status   08/15/2021 32.0 (H) 22.0 - 29.0 mmol/L Final   08/14/2021 33.0 (H) 22.0 - 29.0 mmol/L Final   08/13/2021 33.0 (H) 22.0 - 29.0 mmol/L Final      BUN BUN   Date Value Ref Range Status   08/15/2021 45 (H) 8 - 23 mg/dL Final   08/14/2021 87 (H) 8 - 23 mg/dL Final   08/13/2021 82 (H) 8 - 23 mg/dL Final      Creatinine Creatinine   Date Value Ref Range Status   08/15/2021 1.70 (H) 0.76 - 1.27 mg/dL Final   08/14/2021 2.73 (H) 0.76 - 1.27 mg/dL Final   08/13/2021 2.95 (H) 0.76 - 1.27 mg/dL Final      Calcium Calcium   Date Value Ref Range Status   08/15/2021 8.4 (L) 8.6 - 10.5 mg/dL Final   08/14/2021 9.0 8.6 - 10.5 mg/dL Final   08/13/2021 8.3 (L) 8.6 - 10.5 mg/dL Final      PO4 No results found for: CAPO4   Albumin Albumin   Date Value Ref Range Status   08/15/2021 3.50 3.50 - 5.20 g/dL Final   08/14/2021 3.80 3.50 - 5.20 g/dL Final   08/13/2021 3.60 3.50 - 5.20 g/dL Final      Magnesium Magnesium   Date Value Ref Range Status   08/14/2021 2.2 1.6 - 2.4 mg/dL Final   08/13/2021 2.2 1.6 - 2.4 mg/dL Final      Uric Acid No results found for: URICACID        Results Review:     I reviewed the patient's new  clinical results.    amiodarone, 200 mg, Oral, Q12H  apixaban, 5 mg, Oral, Q12H  aspirin, 81 mg, Oral, Daily  atorvastatin, 40 mg, Oral, Nightly  budesonide, 0.5 mg, Nebulization, BID - RT  carvedilol, 6.25 mg, Oral, BID With Meals  cholecalciferol, 1,000 Units, Oral, Daily  dilTIAZem CD, 240 mg, Oral, Q24H  donepezil, 10 mg, Oral, Nightly  DULoxetine, 60 mg, Oral, Daily  epoetin izabella-epbx, 20,000 Units, Subcutaneous, Weekly  hydrALAZINE, 50 mg, Oral, Q12H  insulin lispro, 0-14 Units, Subcutaneous, TID AC  ipratropium-albuterol, 3 mL, Nebulization, Q4H - RT  isosorbide mononitrate, 60 mg, Oral, BID - Nitrates  metOLazone, 5 mg, Oral, Daily  sodium chloride, 10 mL, Intravenous, Q12H  vitamin B-12, 1,000 mcg, Oral, Daily      bumetanide, 1 mg/hr, Last Rate: Stopped (08/14/21 2015)        Medication Review: Reviewed    Assessment/Plan       Atrial fibrillation with RVR (CMS/[facility])    S/P CABG (coronary artery bypass graft)    Essential hypertension    Elevated troponin    ANNETTE treated with BiPAP    Mixed hyperlipidemia    Flaccid hemiplegia of right dominant side as late effect of cerebral infarction (CMS/[facility])    Diabetic neuropathy (CMS/[facility])    Unspecified dementia with behavioral disturbance (CMS/[facility])    Coronary artery disease    Chronic venous hypertension (idiopathic) with ulcer and inflammation of bilateral lower extremity (CMS/[facility])    Chronic obstructive pulmonary disease, unspecified (CMS/[facility])    Chronic foot ulcer (CMS/[facility])    CKD (chronic kidney disease) stage 3, GFR 30-59 ml/min (CMS/[facility])    Tobacco use disorder    Acute on chronic systolic heart failure (CMS/[facility])    Anemia of renal disease      1. DILEEP, worsening, oliguric, HD initiated  2. CKD III  3. Hyperkalemia, improved post HD  4. Pulmonary edema  5. Tachycardia  6. CHF  7. T2DM  8. Hypertension  9. Anemia  10.  hypoxemic respiratory failure on BiPAP/high flow O2       PLAN:  Tolerated the initial dialysis well yesterday with good improvement in  electrolytes/volume  We will plan additional dialysis tomorrow, UF as tolerated  Continue diuretics  After tomorrow, additional HD as needed.  Shiley in place  Albumin and mannitol with HD to assist with fluid removal  Continue current BP regimen      Crow Vences M.D.  [facility]  [phone]

## 2021-08-15 NOTE — PROGRESS NOTES
"PULMONARY CRITICAL CARE Progress  NOTE      PATIENT IDENTIFICATION:  Name: Benson Mclean  MRN: [id number]  :  1950     Age: 70 y.o.  Sex: male    DATE OF Note:  8/15/2021   Referring Physician: Angel Prajapati MD                  Subjective:   Transferred to ICU  for worsening O2 needs  HD yest 1.1L removed  Diuresis per nephrology  -35L/100% alternating with AVAPs  -keep in ICU for now due to high O2 needs  -No new skin issues      Objective:  tMax 24 hrs: Temp (24hrs), Av.9 °F (36.1 °C), Min:96.2 °F (35.7 °C), Max:97.5 °F (36.4 °C)      Vitals Ranges:   Temp:  [96.2 °F (35.7 °C)-97.5 °F (36.4 °C)] 96.5 °F (35.8 °C)  Heart Rate:  [] 91  Resp:  [18-26] 18  BP: ()/(48-83) 113/70    Intake and Output Last 3 Shifts:   I/O last 3 completed shifts:  In: 610 [P.O.:480; I.V.:98; IV Piggyback:32]  Out: 2475 [Urine:1375; Other:1100]    Exam:  /70   Pulse 91   Temp 96.5 °F (35.8 °C) (Axillary)   Resp 18   Ht 170.2 cm (67.01\")   Wt 113 kg (248 lb 3.8 oz)   SpO2 98%   BMI 38.87 kg/m²     General Appearance:     HEENT:  Normocephalic, without obvious abnormality, Conjunctiva/corneas clear.  Normal external ear canals, Nares normal, no drainage     Neck:  Supple, symmetrical, trachea midline. No JVD.  Lungs /Chest wall: Scattered bilateral rhonchi, no wheezing, bibasilar crackles, respirations unlabored symmetrical wall movement.     Heart: Irregularly irregular, atrial fibrillation, +DEON, no rub or gallop  Abdomen: Soft, non-tender, active bowel sounds  Extremities: Trace BLE edema, no clubbing or cyanosis        Medications:    Current Facility-Administered Medications:   •  acetaminophen (TYLENOL) tablet 650 mg, 650 mg, Oral, Q4H PRN, 650 mg at 21 1713 **OR** acetaminophen (TYLENOL) 160 MG/5ML solution 650 mg, 650 mg, Oral, Q4H PRN **OR** acetaminophen (TYLENOL) suppository 650 mg, 650 mg, Rectal, Q4H PRN, Rayne Bah, APRN  •  albumin human 25 % IV SOLN 12.5 g, 12.5 g, " Intravenous, PRN, Crow Vences MD  •  aluminum-magnesium hydroxide-simethicone (MAALOX MAX) 400-400-40 MG/5ML suspension 15 mL, 15 mL, Oral, Q6H PRN, Rayne Bah APRN  •  amiodarone (PACERONE) tablet 200 mg, 200 mg, Oral, Q12H, Jose G Rm MD, 200 mg at 08/15/21 0848  •  apixaban (ELIQUIS) tablet 5 mg, 5 mg, Oral, Q12H, Jose G Rm MD, 5 mg at 08/15/21 0851  •  aspirin chewable tablet 81 mg, 81 mg, Oral, Daily, Rayne Bah APRN, 81 mg at 08/15/21 0849  •  atorvastatin (LIPITOR) tablet 40 mg, 40 mg, Oral, Nightly, Rayne Bah APRN, 40 mg at 08/14/21 2200  •  bumetanide (BUMEX) 0.25 MG/ML infusion, 1 mg/hr, Intravenous, Continuous, Crow Vences MD, Stopped at 08/14/21 2015  •  carvedilol (COREG) tablet 6.25 mg, 6.25 mg, Oral, BID With Meals, Jose G Rm MD, 6.25 mg at 08/15/21 0848  •  cholecalciferol (VITAMIN D3) tablet 1,000 Units, 1,000 Units, Oral, Daily, Rayne Bah APRN, 1,000 Units at 08/15/21 0849  •  dextrose (D50W) 25 g/ 50mL Intravenous Solution 25 g, 25 g, Intravenous, Q15 Min PRN, Stanton Junior MD  •  dextrose (GLUTOSE) oral gel 15 g, 15 g, Oral, Q15 Min PRN, Stanton Junior MD  •  dilTIAZem CD (CARDIZEM CD) 24 hr capsule 240 mg, 240 mg, Oral, Q24H, Jose G Rm MD, 240 mg at 08/15/21 0849  •  donepezil (ARICEPT) tablet 10 mg, 10 mg, Oral, Nightly, Rayne Bah APRN, 10 mg at 08/14/21 2200  •  DULoxetine (CYMBALTA) DR capsule 60 mg, 60 mg, Oral, Daily, Rayne Bah, FABIOLA, 60 mg at 08/15/21 0848  •  epoetin izabella-epbx (RETACRIT) injection 20,000 Units, 20,000 Units, Subcutaneous, Weekly, Yony Bhat MD, 20,000 Units at 08/13/21 2131  •  glucagon (human recombinant) (GLUCAGEN DIAGNOSTIC) injection 1 mg, 1 mg, Subcutaneous, Q15 Min PRN, Stanton Junior MD  •  heparin (porcine) injection 4,000 Units, 4,000 Units, Intracatheter, PRN, Crow Vences MD, 4,000 Units at 08/15/21 0030  •  hydrALAZINE (APRESOLINE) tablet 50 mg, 50 mg, Oral,  Q12H, Rayne Bah APRN, 50 mg at 08/15/21 0848  •  insulin lispro (ADMELOG) injection 0-14 Units, 0-14 Units, Subcutaneous, TID AC, 3 Units at 08/15/21 0759 **AND** insulin lispro (ADMELOG) injection 0-14 Units, 0-14 Units, Subcutaneous, PRN, Marcelo Álvarez MD  •  ipratropium-albuterol (DUO-NEB) nebulizer solution 3 mL, 3 mL, Nebulization, 4x Daily - RT, Rayne Bah APRN, 3 mL at 08/15/21 0850  •  isosorbide mononitrate (IMDUR) 24 hr tablet 60 mg, 60 mg, Oral, BID - Nitrates, Rayne Bah APRN, 60 mg at 08/15/21 0848  •  melatonin tablet 5 mg, 5 mg, Oral, Nightly PRN, Rayne Bah APRN  •  metOLazone (ZAROXOLYN) tablet 5 mg, 5 mg, Oral, Daily, Katerina Beyer MD, 5 mg at 08/15/21 0848  •  nitroglycerin (NITROSTAT) SL tablet 0.4 mg, 0.4 mg, Sublingual, Q5 Min PRN, Rayne Bah APRN  •  ondansetron (ZOFRAN) tablet 4 mg, 4 mg, Oral, Q6H PRN **OR** ondansetron (ZOFRAN) injection 4 mg, 4 mg, Intravenous, Q6H PRN, Rayne Bah APRN, 4 mg at 08/15/21 0510  •  [COMPLETED] Insert peripheral IV, , , Once **AND** sodium chloride 0.9 % flush 10 mL, 10 mL, Intravenous, PRN, Davis Zavala MD  •  sodium chloride 0.9 % flush 10 mL, 10 mL, Intravenous, Q12H, Rayne Bah APRN, 10 mL at 08/15/21 0850  •  sodium chloride 0.9 % flush 10 mL, 10 mL, Intravenous, PRN, Rayne Bah APRN  •  vitamin B-12 (CYANOCOBALAMIN) tablet 1,000 mcg, 1,000 mcg, Oral, Daily, Rayne Bah, FABIOLA, 1,000 mcg at 08/15/21 0849    Data Review:  All labs (24hrs):   Recent Results (from the past 24 hour(s))   POC Glucose Once    Collection Time: 08/14/21 11:24 AM    Specimen: Blood   Result Value Ref Range    Glucose 196 (H) 70 - 105 mg/dL   Blood Gas, Arterial -    Collection Time: 08/14/21  4:41 PM    Specimen: Arterial Blood   Result Value Ref Range    Site Right Radial     Fitz's Test Positive     pH, Arterial 7.444 7.350 - 7.450 pH units    pCO2, Arterial 49.7 (H) 35.0 - 48.0 mm Hg    pO2, Arterial 173.2 (H) 83.0 - 108.0 mm Hg     HCO3, Arterial 34.1 (H) 21.0 - 28.0 mmol/L    Base Excess, Arterial 9.0 (H) 0.0 - 3.0 mmol/L    O2 Saturation, Arterial 99.6 (H) 94.0 - 98.0 %    CO2 Content 35.6 (H) 22 - 29 mmol/L    Barometric Pressure for Blood Gas      Modality BiPap     FIO2 70 %    Set Tidal Volume 500     PEEP 9     Hemodilution No     Respiratory Rate 20    POC Glucose Once    Collection Time: 08/14/21  5:19 PM    Specimen: Blood   Result Value Ref Range    Glucose 192 (H) 70 - 105 mg/dL   Hepatitis B Surface Antigen    Collection Time: 08/14/21  8:35 PM    Specimen: Blood   Result Value Ref Range    Hepatitis B Surface Ag Non-Reactive Non-Reactive   POC Glucose Once    Collection Time: 08/14/21 10:41 PM    Specimen: Blood   Result Value Ref Range    Glucose 164 (H) 70 - 105 mg/dL   Renal Function Panel    Collection Time: 08/15/21  4:47 AM    Specimen: Blood   Result Value Ref Range    Glucose 149 (H) 65 - 99 mg/dL    BUN 45 (H) 8 - 23 mg/dL    Creatinine 1.70 (H) 0.76 - 1.27 mg/dL    Sodium 133 (L) 136 - 145 mmol/L    Potassium 3.4 (L) 3.5 - 5.2 mmol/L    Chloride 91 (L) 98 - 107 mmol/L    CO2 32.0 (H) 22.0 - 29.0 mmol/L    Calcium 8.4 (L) 8.6 - 10.5 mg/dL    Albumin 3.50 3.50 - 5.20 g/dL    Phosphorus 2.7 2.5 - 4.5 mg/dL    Anion Gap 10.0 5.0 - 15.0 mmol/L    BUN/Creatinine Ratio 26.5 (H) 7.0 - 25.0    eGFR Non African Amer 40 (L) >60 mL/min/1.73   POC Glucose Once    Collection Time: 08/15/21  7:51 AM    Specimen: Blood   Result Value Ref Range    Glucose 167 (H) 70 - 105 mg/dL        Imaging:  XR Chest 1 View  Narrative: Examination: XR CHEST 1 VW-     Date of Exam: 8/14/2021 5:08 PM     Indication: Central line placement; R00.0-Tachycardia, unspecified;  R06.00-Dyspnea, unspecified.     Comparison: Radiograph 08/14/2021, 1121     Technique: 1 view of the chest      Findings:  There is a right internal jugular CVC catheter with the tip in the  proximal SVC. No evidence of pneumothorax. Patchy airspace disease is  seen within the  lung bases bilaterally appears to have improved as  compared to the previous study. Small bilateral pleural effusions are  present which also improved. The heart size is enlarged. Pulmonary  vasculature appears indistinct. Median sternotomy wires are present. No  acute osseous abnormality identified.     Impression: Right internal jugular CVC catheter with the tip in the proximal SVC. No  pneumothorax. Bibasilar airspace disease is present with small bilateral  pleural effusions which appears to have improved as compared to the  previous study.     Electronically Signed By-Angelica Neves MD On:8/14/2021 5:25 PM  This report was finalized on [id number] by  Angelica Neves MD.  XR Chest 1 View  Narrative: Chest one view.    DATE: 8/14/2021.    COMPARISON: 8/11/2021.    CLINICAL HISTORY: Hypoxia.    FINDINGS:    There is worsening interstitial and airspace opacity seen throughout the lungs bilaterally which is likely related to worsening pulmonary edema.    There is likely a small left and small-to-moderate right pleural effusion.    The cardiac silhouette is enlarged.    There are midline sternotomy wires.  Impression: Worsening pulmonary edema and bilateral effusions.    Electronically signed by:  Sal Nieto D.O.    8/14/2021 3:10 AM       ASSESSMENT:  Pneumonia  Bilateral pleural effusion  Atrial fibrillation with RVR (CMS/[facility])    S/P CABG (coronary artery bypass graft)    Essential hypertension    Elevated troponin    ANNETTE treated with BiPAP    Mixed hyperlipidemia    Flaccid hemiplegia of right dominant side as late effect of cerebral infarction (CMS/[facility])    Diabetic neuropathy (CMS/[facility])    Unspecified dementia with behavioral disturbance (CMS/[facility]CC)    Coronary artery disease    Chronic venous hypertension (idiopathic) with ulcer and inflammation of bilateral lower extremity (CMS/[facility])    Chronic obstructive pulmonary disease, unspecified (CMS/[facility]CC)    Chronic foot ulcer (CMS/[facility]CC)    CKD (chronic kidney disease)  stage 3, GFR 30-59 ml/min (CMS/[facility])    Tobacco use disorder    Acute on chronic systolic heart failure (CMS/[facility])    Anemia of renal disease     PLAN:  O2 support titrated to maintain a saturation of 88 to 91%  Currently alternating precision flow with AVAPS as tolerated  Continue anticoagulation for now  Nephrology following closely, initial dialysis yesterday with plan for dialysis again tomorrow  Volume management per nephrology  Bronchodilator  Inhaled corticosteroids  Electrolytes/ glycemic control  DVT and GI prophylaxis.  Nutrition: P.o. diet and supplementation with boost.  Small amounts of intake as tolerated by saturations/O2 needs     8/15/2021  10:29 EDT     I personally have examined  and interviewed the patient. I have reviewed the history, data, problems, assessment and plan with our NP.  Critical care time in direct medical management ( 33  ) minutes  Electronically signed by Marcelo Álvarez MD, D,ABS, 08/15/21, 9:37 PM EDT.

## 2021-08-15 NOTE — PLAN OF CARE
Goal Outcome Evaluation:         BP low during HD at the start of this shift. Improved and stable later. Patient alternating between AVAPs on bipap and precision flow.

## 2021-08-16 ENCOUNTER — APPOINTMENT (OUTPATIENT)
Dept: GENERAL RADIOLOGY | Facility: HOSPITAL | Age: 71
End: 2021-08-16

## 2021-08-16 PROBLEM — E55.9 VITAMIN D DEFICIENCY: Chronic | Status: ACTIVE | Noted: 2018-04-23

## 2021-08-16 PROBLEM — J96.01 ACUTE RESPIRATORY FAILURE WITH HYPOXIA AND HYPERCAPNIA (HCC): Status: ACTIVE | Noted: 2021-08-04

## 2021-08-16 PROBLEM — L97.509 CHRONIC FOOT ULCER (HCC): Chronic | Status: ACTIVE | Noted: 2018-10-22

## 2021-08-16 PROBLEM — E66.9 OBESITY: Chronic | Status: ACTIVE | Noted: 2021-05-06

## 2021-08-16 PROBLEM — J96.01 ACUTE RESPIRATORY FAILURE WITH HYPOXIA AND HYPERCAPNIA (HCC): Status: ACTIVE | Noted: 2021-08-10

## 2021-08-16 PROBLEM — L97.919 CHRONIC VENOUS HYPERTENSION (IDIOPATHIC) WITH ULCER AND INFLAMMATION OF BILATERAL LOWER EXTREMITY (HCC): Chronic | Status: ACTIVE | Noted: 2019-03-20

## 2021-08-16 PROBLEM — G89.29 CHRONIC BACK PAIN: Status: ACTIVE | Noted: 2020-12-02

## 2021-08-16 PROBLEM — E11.65 TYPE 2 DIABETES MELLITUS WITH HYPERGLYCEMIA (HCC): Chronic | Status: ACTIVE | Noted: 2021-05-19

## 2021-08-16 PROBLEM — IMO0001 SEVERE ACUTE RESPIRATORY SYNDROME CORONAVIRUS 2 (SARS-COV-2) VACCINATION NOT INDICATED: Status: ACTIVE | Noted: 2021-02-20

## 2021-08-16 PROBLEM — I48.0 PAROXYSMAL ATRIAL FIBRILLATION (HCC): Chronic | Status: ACTIVE | Noted: 2020-12-01

## 2021-08-16 PROBLEM — I87.333 CHRONIC VENOUS HYPERTENSION (IDIOPATHIC) WITH ULCER AND INFLAMMATION OF BILATERAL LOWER EXTREMITY (HCC): Chronic | Status: ACTIVE | Noted: 2019-03-20

## 2021-08-16 PROBLEM — L97.929 CHRONIC VENOUS HYPERTENSION (IDIOPATHIC) WITH ULCER AND INFLAMMATION OF BILATERAL LOWER EXTREMITY (HCC): Chronic | Status: ACTIVE | Noted: 2019-03-20

## 2021-08-16 PROBLEM — M54.9 CHRONIC BACK PAIN: Status: ACTIVE | Noted: 2020-12-02

## 2021-08-16 PROBLEM — D89.831 CYTOKINE RELEASE SYNDROME, GRADE 1: Status: RESOLVED | Noted: 2021-05-24 | Resolved: 2021-08-16

## 2021-08-16 PROBLEM — N17.9 ACUTE KIDNEY INJURY (HCC): Status: ACTIVE | Noted: 2021-08-03

## 2021-08-16 PROBLEM — G89.29 CHRONIC BACK PAIN: Chronic | Status: ACTIVE | Noted: 2020-12-02

## 2021-08-16 PROBLEM — J44.9 CHRONIC OBSTRUCTIVE PULMONARY DISEASE, UNSPECIFIED: Chronic | Status: ACTIVE | Noted: 2020-12-01

## 2021-08-16 PROBLEM — U07.1 COVID-19: Status: RESOLVED | Noted: 2021-02-19 | Resolved: 2021-08-16

## 2021-08-16 PROBLEM — F17.200 TOBACCO USE DISORDER: Chronic | Status: ACTIVE | Noted: 2021-07-21

## 2021-08-16 PROBLEM — I47.29 NONSUSTAINED VENTRICULAR TACHYCARDIA (HCC): Status: RESOLVED | Noted: 2021-07-23 | Resolved: 2021-08-16

## 2021-08-16 PROBLEM — Z95.1 S/P CABG (CORONARY ARTERY BYPASS GRAFT): Chronic | Status: ACTIVE | Noted: 2020-01-23

## 2021-08-16 PROBLEM — J96.02 ACUTE RESPIRATORY FAILURE WITH HYPOXIA AND HYPERCAPNIA (HCC): Status: ACTIVE | Noted: 2021-08-10

## 2021-08-16 PROBLEM — G89.29 CHRONIC LEFT-SIDED LOW BACK PAIN WITHOUT SCIATICA: Chronic | Status: ACTIVE | Noted: 2017-12-27

## 2021-08-16 PROBLEM — I20.0 UNSTABLE ANGINA (HCC): Status: RESOLVED | Noted: 2021-05-26 | Resolved: 2021-08-16

## 2021-08-16 PROBLEM — I10 ESSENTIAL HYPERTENSION: Chronic | Status: ACTIVE | Noted: 2020-01-23

## 2021-08-16 PROBLEM — E08.9 DIABETES MELLITUS DUE TO UNDERLYING CONDITION WITHOUT COMPLICATION, WITHOUT LONG-TERM CURRENT USE OF INSULIN (HCC): Chronic | Status: ACTIVE | Noted: 2020-01-23

## 2021-08-16 PROBLEM — I48.91 ATRIAL FIBRILLATION WITH RVR (HCC): Status: RESOLVED | Noted: 2021-08-01 | Resolved: 2021-08-16

## 2021-08-16 PROBLEM — I48.91 A-FIB (HCC): Chronic | Status: ACTIVE | Noted: 2021-08-03

## 2021-08-16 PROBLEM — J96.02 ACUTE RESPIRATORY FAILURE WITH HYPOXIA AND HYPERCAPNIA (HCC): Status: ACTIVE | Noted: 2021-08-04

## 2021-08-16 PROBLEM — F33.0 MAJOR DEPRESSIVE DISORDER, RECURRENT, MILD (HCC): Chronic | Status: ACTIVE | Noted: 2020-12-01

## 2021-08-16 PROBLEM — G47.33 OSA TREATED WITH BIPAP: Chronic | Status: ACTIVE | Noted: 2017-06-19

## 2021-08-16 PROBLEM — M54.9 CHRONIC BACK PAIN: Chronic | Status: ACTIVE | Noted: 2020-12-02

## 2021-08-16 PROBLEM — R06.03 ACUTE RESPIRATORY DISTRESS: Status: RESOLVED | Noted: 2021-06-25 | Resolved: 2021-08-16

## 2021-08-16 PROBLEM — F03.91 UNSPECIFIED DEMENTIA WITH BEHAVIORAL DISTURBANCE: Chronic | Status: ACTIVE | Noted: 2020-12-01

## 2021-08-16 PROBLEM — I25.10 CORONARY ARTERY DISEASE: Chronic | Status: ACTIVE | Noted: 2018-07-03

## 2021-08-16 PROBLEM — M54.50 CHRONIC LEFT-SIDED LOW BACK PAIN WITHOUT SCIATICA: Chronic | Status: ACTIVE | Noted: 2017-12-27

## 2021-08-16 LAB
ABO GROUP BLD: NORMAL
ALBUMIN SERPL-MCNC: 3.2 G/DL (ref 3.5–5.2)
ANION GAP SERPL CALCULATED.3IONS-SCNC: 11 MMOL/L (ref 5–15)
BASOPHILS # BLD AUTO: 0 10*3/MM3 (ref 0–0.2)
BASOPHILS # BLD AUTO: 0.1 10*3/MM3 (ref 0–0.2)
BASOPHILS NFR BLD AUTO: 0.2 % (ref 0–1.5)
BASOPHILS NFR BLD AUTO: 1.2 % (ref 0–1.5)
BLD GP AB SCN SERPL QL: NEGATIVE
BUN SERPL-MCNC: 57 MG/DL (ref 8–23)
BUN/CREAT SERPL: 23.3 (ref 7–25)
CALCIUM SPEC-SCNC: 8.4 MG/DL (ref 8.6–10.5)
CHLORIDE SERPL-SCNC: 93 MMOL/L (ref 98–107)
CO2 SERPL-SCNC: 32 MMOL/L (ref 22–29)
CREAT SERPL-MCNC: 2.45 MG/DL (ref 0.76–1.27)
DEPRECATED RDW RBC AUTO: 49 FL (ref 37–54)
DEPRECATED RDW RBC AUTO: 49.9 FL (ref 37–54)
EOSINOPHIL # BLD AUTO: 0.3 10*3/MM3 (ref 0–0.4)
EOSINOPHIL # BLD AUTO: 0.5 10*3/MM3 (ref 0–0.4)
EOSINOPHIL NFR BLD AUTO: 4.3 % (ref 0.3–6.2)
EOSINOPHIL NFR BLD AUTO: 6.5 % (ref 0.3–6.2)
ERYTHROCYTE [DISTWIDTH] IN BLOOD BY AUTOMATED COUNT: 16.2 % (ref 12.3–15.4)
ERYTHROCYTE [DISTWIDTH] IN BLOOD BY AUTOMATED COUNT: 16.3 % (ref 12.3–15.4)
GFR SERPL CREATININE-BSD FRML MDRD: 26 ML/MIN/1.73
GLUCOSE BLDC GLUCOMTR-MCNC: 158 MG/DL (ref 70–105)
GLUCOSE BLDC GLUCOMTR-MCNC: 161 MG/DL (ref 70–105)
GLUCOSE BLDC GLUCOMTR-MCNC: 219 MG/DL (ref 70–105)
GLUCOSE BLDC GLUCOMTR-MCNC: 220 MG/DL (ref 70–105)
GLUCOSE SERPL-MCNC: 167 MG/DL (ref 65–99)
HCT VFR BLD AUTO: 19.5 % (ref 37.5–51)
HCT VFR BLD AUTO: 22.9 % (ref 37.5–51)
HCT VFR BLD AUTO: 22.9 % (ref 37.5–51)
HGB BLD-MCNC: 6.6 G/DL (ref 13–17.7)
HGB BLD-MCNC: 7.6 G/DL (ref 13–17.7)
HGB BLD-MCNC: 7.6 G/DL (ref 13–17.7)
LYMPHOCYTES # BLD AUTO: 1.3 10*3/MM3 (ref 0.7–3.1)
LYMPHOCYTES # BLD AUTO: 1.8 10*3/MM3 (ref 0.7–3.1)
LYMPHOCYTES NFR BLD AUTO: 18 % (ref 19.6–45.3)
LYMPHOCYTES NFR BLD AUTO: 25.5 % (ref 19.6–45.3)
MCH RBC QN AUTO: 28.6 PG (ref 26.6–33)
MCH RBC QN AUTO: 29.2 PG (ref 26.6–33)
MCHC RBC AUTO-ENTMCNC: 33.2 G/DL (ref 31.5–35.7)
MCHC RBC AUTO-ENTMCNC: 33.8 G/DL (ref 31.5–35.7)
MCV RBC AUTO: 86.2 FL (ref 79–97)
MCV RBC AUTO: 86.4 FL (ref 79–97)
MONOCYTES # BLD AUTO: 0.8 10*3/MM3 (ref 0.1–0.9)
MONOCYTES # BLD AUTO: 0.8 10*3/MM3 (ref 0.1–0.9)
MONOCYTES NFR BLD AUTO: 10.4 % (ref 5–12)
MONOCYTES NFR BLD AUTO: 11.9 % (ref 5–12)
NEUTROPHILS NFR BLD AUTO: 3.9 10*3/MM3 (ref 1.7–7)
NEUTROPHILS NFR BLD AUTO: 4.8 10*3/MM3 (ref 1.7–7)
NEUTROPHILS NFR BLD AUTO: 55.9 % (ref 42.7–76)
NEUTROPHILS NFR BLD AUTO: 66.1 % (ref 42.7–76)
NRBC BLD AUTO-RTO: 0.1 /100 WBC (ref 0–0.2)
NRBC BLD AUTO-RTO: 0.1 /100 WBC (ref 0–0.2)
PHOSPHATE SERPL-MCNC: 3.5 MG/DL (ref 2.5–4.5)
PLATELET # BLD AUTO: 301 10*3/MM3 (ref 140–450)
PLATELET # BLD AUTO: 305 10*3/MM3 (ref 140–450)
PMV BLD AUTO: 7.9 FL (ref 6–12)
PMV BLD AUTO: 8.1 FL (ref 6–12)
POTASSIUM SERPL-SCNC: 3.5 MMOL/L (ref 3.5–5.2)
RBC # BLD AUTO: 2.26 10*6/MM3 (ref 4.14–5.8)
RBC # BLD AUTO: 2.66 10*6/MM3 (ref 4.14–5.8)
RH BLD: POSITIVE
SODIUM SERPL-SCNC: 136 MMOL/L (ref 136–145)
T&S EXPIRATION DATE: NORMAL
WBC # BLD AUTO: 7.1 10*3/MM3 (ref 3.4–10.8)
WBC # BLD AUTO: 7.2 10*3/MM3 (ref 3.4–10.8)

## 2021-08-16 PROCEDURE — 25010000002 HEPARIN (PORCINE) PER 1000 UNITS: Performed by: INTERNAL MEDICINE

## 2021-08-16 PROCEDURE — 94799 UNLISTED PULMONARY SVC/PX: CPT

## 2021-08-16 PROCEDURE — 86923 COMPATIBILITY TEST ELECTRIC: CPT

## 2021-08-16 PROCEDURE — 94660 CPAP INITIATION&MGMT: CPT

## 2021-08-16 PROCEDURE — 80069 RENAL FUNCTION PANEL: CPT | Performed by: INTERNAL MEDICINE

## 2021-08-16 PROCEDURE — 82962 GLUCOSE BLOOD TEST: CPT

## 2021-08-16 PROCEDURE — 71045 X-RAY EXAM CHEST 1 VIEW: CPT

## 2021-08-16 PROCEDURE — 97530 THERAPEUTIC ACTIVITIES: CPT

## 2021-08-16 PROCEDURE — 86901 BLOOD TYPING SEROLOGIC RH(D): CPT | Performed by: NURSE PRACTITIONER

## 2021-08-16 PROCEDURE — 63710000001 INSULIN LISPRO (HUMAN) PER 5 UNITS: Performed by: INTERNAL MEDICINE

## 2021-08-16 PROCEDURE — 99232 SBSQ HOSP IP/OBS MODERATE 35: CPT | Performed by: NURSE PRACTITIONER

## 2021-08-16 PROCEDURE — 99233 SBSQ HOSP IP/OBS HIGH 50: CPT | Performed by: INTERNAL MEDICINE

## 2021-08-16 PROCEDURE — 5A1D70Z PERFORMANCE OF URINARY FILTRATION, INTERMITTENT, LESS THAN 6 HOURS PER DAY: ICD-10-PCS | Performed by: INTERNAL MEDICINE

## 2021-08-16 PROCEDURE — 86850 RBC ANTIBODY SCREEN: CPT | Performed by: NURSE PRACTITIONER

## 2021-08-16 PROCEDURE — 86900 BLOOD TYPING SEROLOGIC ABO: CPT

## 2021-08-16 PROCEDURE — 86900 BLOOD TYPING SEROLOGIC ABO: CPT | Performed by: NURSE PRACTITIONER

## 2021-08-16 PROCEDURE — P9016 RBC LEUKOCYTES REDUCED: HCPCS

## 2021-08-16 PROCEDURE — 85025 COMPLETE CBC W/AUTO DIFF WBC: CPT | Performed by: INTERNAL MEDICINE

## 2021-08-16 RX ORDER — ALBUMIN (HUMAN) 12.5 G/50ML
12.5 SOLUTION INTRAVENOUS AS NEEDED
Status: CANCELLED | OUTPATIENT
Start: 2021-08-16 | End: 2021-08-17

## 2021-08-16 RX ORDER — BUMETANIDE 0.25 MG/ML
2 INJECTION INTRAMUSCULAR; INTRAVENOUS EVERY 12 HOURS
Status: DISCONTINUED | OUTPATIENT
Start: 2021-08-16 | End: 2021-08-21

## 2021-08-16 RX ORDER — HEPARIN SODIUM 1000 [USP'U]/ML
2200 INJECTION, SOLUTION INTRAVENOUS; SUBCUTANEOUS AS NEEDED
Status: CANCELLED | OUTPATIENT
Start: 2021-08-16

## 2021-08-16 RX ADMIN — ISOSORBIDE MONONITRATE 60 MG: 60 TABLET, EXTENDED RELEASE ORAL at 17:55

## 2021-08-16 RX ADMIN — CYANOCOBALAMIN TAB 1000 MCG 1000 MCG: 1000 TAB at 08:39

## 2021-08-16 RX ADMIN — INSULIN LISPRO 3 UNITS: 100 INJECTION, SOLUTION INTRAVENOUS; SUBCUTANEOUS at 17:55

## 2021-08-16 RX ADMIN — APIXABAN 5 MG: 5 TABLET, FILM COATED ORAL at 08:39

## 2021-08-16 RX ADMIN — INSULIN LISPRO 3 UNITS: 100 INJECTION, SOLUTION INTRAVENOUS; SUBCUTANEOUS at 08:39

## 2021-08-16 RX ADMIN — Medication 10 ML: at 08:39

## 2021-08-16 RX ADMIN — CARVEDILOL 6.25 MG: 6.25 TABLET, FILM COATED ORAL at 17:55

## 2021-08-16 RX ADMIN — IPRATROPIUM BROMIDE AND ALBUTEROL SULFATE 3 ML: 2.5; .5 SOLUTION RESPIRATORY (INHALATION) at 15:40

## 2021-08-16 RX ADMIN — AMIODARONE HYDROCHLORIDE 200 MG: 200 TABLET ORAL at 08:39

## 2021-08-16 RX ADMIN — ISOSORBIDE MONONITRATE 60 MG: 60 TABLET, EXTENDED RELEASE ORAL at 08:39

## 2021-08-16 RX ADMIN — DILTIAZEM HYDROCHLORIDE 240 MG: 240 CAPSULE, COATED, EXTENDED RELEASE ORAL at 08:39

## 2021-08-16 RX ADMIN — HYDRALAZINE HYDROCHLORIDE 50 MG: 25 TABLET, FILM COATED ORAL at 08:39

## 2021-08-16 RX ADMIN — IPRATROPIUM BROMIDE AND ALBUTEROL SULFATE 3 ML: 2.5; .5 SOLUTION RESPIRATORY (INHALATION) at 07:12

## 2021-08-16 RX ADMIN — BUDESONIDE 0.5 MG: 0.5 SUSPENSION RESPIRATORY (INHALATION) at 07:12

## 2021-08-16 RX ADMIN — IPRATROPIUM BROMIDE AND ALBUTEROL SULFATE 3 ML: 2.5; .5 SOLUTION RESPIRATORY (INHALATION) at 19:16

## 2021-08-16 RX ADMIN — HEPARIN SODIUM 2200 UNITS: 1000 INJECTION INTRAVENOUS; SUBCUTANEOUS at 23:49

## 2021-08-16 RX ADMIN — DULOXETINE 60 MG: 30 CAPSULE, DELAYED RELEASE ORAL at 08:39

## 2021-08-16 RX ADMIN — IPRATROPIUM BROMIDE AND ALBUTEROL SULFATE 3 ML: 2.5; .5 SOLUTION RESPIRATORY (INHALATION) at 23:10

## 2021-08-16 RX ADMIN — Medication 1000 UNITS: at 08:39

## 2021-08-16 RX ADMIN — INSULIN LISPRO 5 UNITS: 100 INJECTION, SOLUTION INTRAVENOUS; SUBCUTANEOUS at 12:52

## 2021-08-16 RX ADMIN — ASPIRIN 81 MG: 81 TABLET, CHEWABLE ORAL at 08:39

## 2021-08-16 RX ADMIN — BUMETANIDE 2 MG: 0.25 INJECTION INTRAMUSCULAR; INTRAVENOUS at 12:51

## 2021-08-16 RX ADMIN — METOLAZONE 5 MG: 5 TABLET ORAL at 08:39

## 2021-08-16 RX ADMIN — IPRATROPIUM BROMIDE AND ALBUTEROL SULFATE 3 ML: 2.5; .5 SOLUTION RESPIRATORY (INHALATION) at 11:20

## 2021-08-16 RX ADMIN — IPRATROPIUM BROMIDE AND ALBUTEROL SULFATE 3 ML: 2.5; .5 SOLUTION RESPIRATORY (INHALATION) at 02:57

## 2021-08-16 RX ADMIN — BUDESONIDE 0.5 MG: 0.5 SUSPENSION RESPIRATORY (INHALATION) at 19:20

## 2021-08-16 RX ADMIN — CARVEDILOL 6.25 MG: 6.25 TABLET, FILM COATED ORAL at 08:39

## 2021-08-16 NOTE — PROGRESS NOTES
"PULMONARY CRITICAL CARE Progress  NOTE      PATIENT IDENTIFICATION:  Name: Benson Mclean  MRN: [id number]  :  1950     Age: 70 y.o.  Sex: male    DATE OF Note:  2021   Referring Physician: Angel Prajapati MD                  Subjective:   -HD planned for today  -Hemoglobin down, transfusing 1 u PRBC  -Received Eliquis  -25L/80% alternating with AVAPS  -O2 needs improving, okay for transfer out of the ICU  -No new skin issues      Objective:  tMax 24 hrs: Temp (24hrs), Av °F (36.7 °C), Min:97.5 °F (36.4 °C), Max:98.4 °F (36.9 °C)      Vitals Ranges:   Temp:  [97.5 °F (36.4 °C)-98.4 °F (36.9 °C)] 97.5 °F (36.4 °C)  Heart Rate:  [56-81] 71  Resp:  [18-23] 20  BP: ()/(35-65) 125/65    Intake and Output Last 3 Shifts:   I/O last 3 completed shifts:  In: 1090 [P.O.:960; I.V.:98; IV Piggyback:32]  Out: 1750 [Urine:650; Other:1100]    Exam:  /65 (BP Location: Left arm, Patient Position: Lying)   Pulse 71   Temp 97.5 °F (36.4 °C) (Axillary)   Resp 20   Ht 170.2 cm (67.01\")   Wt 108 kg (238 lb 12.1 oz)   SpO2 100%   BMI 37.39 kg/m²     General Appearance:     HEENT:  Normocephalic, without obvious abnormality, Conjunctiva/corneas clear.  Normal external ear canals, Nares normal, no drainage     Neck:  Supple, symmetrical, trachea midline. No JVD.  Lungs /Chest wall: Scattered bilateral rhonchi, no wheezing, bibasilar crackles, respirations unlabored symmetrical wall movement.     Heart: Irregularly irregular, atrial fibrillation, +DEON, no rub or gallop  Abdomen: Soft, non-tender, active bowel sounds  Extremities: Trace BLE edema, no clubbing or cyanosis        Medications:    Current Facility-Administered Medications:     acetaminophen (TYLENOL) tablet 650 mg, 650 mg, Oral, Q4H PRN, 650 mg at 21 1713 **OR** acetaminophen (TYLENOL) 160 MG/5ML solution 650 mg, 650 mg, Oral, Q4H PRN **OR** acetaminophen (TYLENOL) suppository 650 mg, 650 mg, Rectal, Q4H PRN, Rayne Bah, APRN    " aluminum-magnesium hydroxide-simethicone (MAALOX MAX) 400-400-40 MG/5ML suspension 15 mL, 15 mL, Oral, Q6H PRN, Rayne Bah APRN    amiodarone (PACERONE) tablet 200 mg, 200 mg, Oral, Q12H, Jose G Rm MD, 200 mg at 08/16/21 0839    apixaban (ELIQUIS) tablet 5 mg, 5 mg, Oral, Q12H, Jose G Rm MD, 5 mg at 08/16/21 0839    aspirin chewable tablet 81 mg, 81 mg, Oral, Daily, Rayne Bah APRN, 81 mg at 08/16/21 0839    atorvastatin (LIPITOR) tablet 40 mg, 40 mg, Oral, Nightly, Rayne aBh APRN, 40 mg at 08/15/21 2059    budesonide (PULMICORT) nebulizer solution 0.5 mg, 0.5 mg, Nebulization, BID - RT, Marcelo Álvarez MD, 0.5 mg at 08/16/21 0712    bumetanide (BUMEX) 0.25 MG/ML infusion, 1 mg/hr, Intravenous, Continuous, Crow Vences MD, Stopped at 08/14/21 2015    carvedilol (COREG) tablet 6.25 mg, 6.25 mg, Oral, BID With Meals, Jose G Rm MD, 6.25 mg at 08/16/21 0839    cholecalciferol (VITAMIN D3) tablet 1,000 Units, 1,000 Units, Oral, Daily, Rayne Bah APRN, 1,000 Units at 08/16/21 0839    dextrose (D50W) 25 g/ 50mL Intravenous Solution 25 g, 25 g, Intravenous, Q15 Min PRN, Stanton Junior MD    dextrose (GLUTOSE) oral gel 15 g, 15 g, Oral, Q15 Min PRN, Stanton Junior MD    dilTIAZem CD (CARDIZEM CD) 24 hr capsule 240 mg, 240 mg, Oral, Q24H, Jose G Rm MD, 240 mg at 08/16/21 0839    donepezil (ARICEPT) tablet 10 mg, 10 mg, Oral, Nightly, Rayne Bah APRN, 10 mg at 08/15/21 2059    DULoxetine (CYMBALTA) DR capsule 60 mg, 60 mg, Oral, Daily, Rayne Bah APRN, 60 mg at 08/16/21 0839    epoetin izabella-epbx (RETACRIT) injection 20,000 Units, 20,000 Units, Subcutaneous, Weekly, Yony Bhat MD, 20,000 Units at 08/13/21 2131    glucagon (human recombinant) (GLUCAGEN DIAGNOSTIC) injection 1 mg, 1 mg, Subcutaneous, Q15 Min PRN, Stanton Junior MD    heparin (porcine) injection 4,000 Units, 4,000 Units, Intracatheter, PRN, Crow Vences MD, 4,000 Units at  08/15/21 0030    hydrALAZINE (APRESOLINE) tablet 50 mg, 50 mg, Oral, Q12H, Rayne Bah APRN, 50 mg at 08/16/21 0839    insulin lispro (ADMELOG) injection 0-14 Units, 0-14 Units, Subcutaneous, TID AC, 3 Units at 08/16/21 0839 **AND** insulin lispro (ADMELOG) injection 0-14 Units, 0-14 Units, Subcutaneous, PRN, Marcelo Álvarez MD    ipratropium-albuterol (DUO-NEB) nebulizer solution 3 mL, 3 mL, Nebulization, Q4H - RT, Marcelo Álvarez MD, 3 mL at 08/16/21 0712    isosorbide mononitrate (IMDUR) 24 hr tablet 60 mg, 60 mg, Oral, BID - Nitrates, Rayne Bah APRN, 60 mg at 08/16/21 0839    melatonin tablet 5 mg, 5 mg, Oral, Nightly PRN, Rayne Bah APRN    metOLazone (ZAROXOLYN) tablet 5 mg, 5 mg, Oral, Daily, Katerina Beyer MD, 5 mg at 08/16/21 0839    nitroglycerin (NITROSTAT) SL tablet 0.4 mg, 0.4 mg, Sublingual, Q5 Min PRN, Rayne Bah APRN    ondansetron (ZOFRAN) tablet 4 mg, 4 mg, Oral, Q6H PRN **OR** ondansetron (ZOFRAN) injection 4 mg, 4 mg, Intravenous, Q6H PRN, Rayne Bah APRN, 4 mg at 08/15/21 0510    [COMPLETED] Insert peripheral IV, , , Once **AND** sodium chloride 0.9 % flush 10 mL, 10 mL, Intravenous, PRN, Davis Zavala MD    sodium chloride 0.9 % flush 10 mL, 10 mL, Intravenous, Q12H, Rayne Bah APRN, 10 mL at 08/16/21 0839    sodium chloride 0.9 % flush 10 mL, 10 mL, Intravenous, PRN, Niurka, Rayne, APRN    vitamin B-12 (CYANOCOBALAMIN) tablet 1,000 mcg, 1,000 mcg, Oral, Daily, Rayne Bah APRN, 1,000 mcg at 08/16/21 0839    Data Review:  All labs (24hrs):   Recent Results (from the past 24 hour(s))   POC Glucose Once    Collection Time: 08/15/21 12:08 PM    Specimen: Blood   Result Value Ref Range    Glucose 252 (H) 70 - 105 mg/dL   POC Glucose Once    Collection Time: 08/15/21  5:20 PM    Specimen: Blood   Result Value Ref Range    Glucose 193 (H) 70 - 105 mg/dL   Renal Function Panel    Collection Time: 08/16/21  3:08 AM    Specimen: Blood   Result Value Ref Range     Glucose 167 (H) 65 - 99 mg/dL    BUN 57 (H) 8 - 23 mg/dL    Creatinine 2.45 (H) 0.76 - 1.27 mg/dL    Sodium 136 136 - 145 mmol/L    Potassium 3.5 3.5 - 5.2 mmol/L    Chloride 93 (L) 98 - 107 mmol/L    CO2 32.0 (H) 22.0 - 29.0 mmol/L    Calcium 8.4 (L) 8.6 - 10.5 mg/dL    Albumin 3.20 (L) 3.50 - 5.20 g/dL    Phosphorus 3.5 2.5 - 4.5 mg/dL    Anion Gap 11.0 5.0 - 15.0 mmol/L    BUN/Creatinine Ratio 23.3 7.0 - 25.0    eGFR Non African Amer 26 (L) >60 mL/min/1.73   CBC Auto Differential    Collection Time: 08/16/21  4:36 AM    Specimen: Blood   Result Value Ref Range    WBC 7.10 3.40 - 10.80 10*3/mm3    RBC 2.26 (L) 4.14 - 5.80 10*6/mm3    Hemoglobin 6.6 (C) 13.0 - 17.7 g/dL    Hematocrit 19.5 (C) 37.5 - 51.0 %    MCV 86.4 79.0 - 97.0 fL    MCH 29.2 26.6 - 33.0 pg    MCHC 33.8 31.5 - 35.7 g/dL    RDW 16.2 (H) 12.3 - 15.4 %    RDW-SD 49.0 37.0 - 54.0 fl    MPV 7.9 6.0 - 12.0 fL    Platelets 301 140 - 450 10*3/mm3    Neutrophil % 55.9 42.7 - 76.0 %    Lymphocyte % 25.5 19.6 - 45.3 %    Monocyte % 11.9 5.0 - 12.0 %    Eosinophil % 6.5 (H) 0.3 - 6.2 %    Basophil % 0.2 0.0 - 1.5 %    Neutrophils, Absolute 3.90 1.70 - 7.00 10*3/mm3    Lymphocytes, Absolute 1.80 0.70 - 3.10 10*3/mm3    Monocytes, Absolute 0.80 0.10 - 0.90 10*3/mm3    Eosinophils, Absolute 0.50 (H) 0.00 - 0.40 10*3/mm3    Basophils, Absolute 0.00 0.00 - 0.20 10*3/mm3    nRBC 0.1 0.0 - 0.2 /100 WBC   Type & Screen    Collection Time: 08/16/21  5:59 AM    Specimen: Blood   Result Value Ref Range    ABO Type A     RH type Positive     Antibody Screen Negative     T&S Expiration Date 8/19/2021 11:59:59 PM    Prepare RBC, 1 Units    Collection Time: 08/16/21  7:20 AM   Result Value Ref Range    Product Code S8801L31     Unit Number [id number]     UNIT  ABO A     UNIT  RH POS     Crossmatch Interpretation Compatible     Dispense Status IS     Blood Expiration Date 874808205430     Blood Type Barcode 6200    POC Glucose Once    Collection Time: 08/16/21  8:22 AM     Specimen: Blood   Result Value Ref Range    Glucose 161 (H) 70 - 105 mg/dL        Imaging:  XR Chest 1 View  Narrative: EXAMINATION: XR CHEST 1 VW-     DATE OF EXAM: 8/16/2021 4:55 AM     INDICATION: Shortness of breath; R00.0-Tachycardia, unspecified;  R06.00-Dyspnea, unspecified.     COMPARISON: Chest radiograph dated 8/14/2021     TECHNIQUE: Portable AP view of the chest was obtained.     FINDINGS:  The right IJ central line tip terminates at the mid SVC. The cardiac  silhouette is enlarged. There is aortic arch atherosclerotic  ossification. Pulmonary vascularity appears indistinct likely related to  central vascular congestion. There is hazy bilateral airspace opacity  and likely layering bilateral pleural effusions, similar to the prior  examination. There are multilevel degenerative changes of the thoracic  spine. Median sternotomy wires are present and intact.     Impression: 1. No significant change in the bibasilar airspace opacities and  layering bilateral pleural effusions.     Electronically Signed By-Anselmo Vaz MD On:8/16/2021 8:17 AM  This report was finalized on [id number] by  Anselmo Vaz MD.       ASSESSMENT:  Pneumonia  Bilateral pleural effusion  Atrial fibrillation with RVR (CMS/HCC)    S/P CABG (coronary artery bypass graft)    Essential hypertension    Elevated troponin    ANNETTE treated with BiPAP    Mixed hyperlipidemia    Flaccid hemiplegia of right dominant side as late effect of cerebral infarction (CMS/HCC)    Diabetic neuropathy (CMS/HCC)    Unspecified dementia with behavioral disturbance (CMS/HCC)    Coronary artery disease    Chronic venous hypertension (idiopathic) with ulcer and inflammation of bilateral lower extremity (CMS/HCC)    Chronic obstructive pulmonary disease, unspecified (CMS/HCC)    Chronic foot ulcer (CMS/HCC)    CKD (chronic kidney disease) stage 3, GFR 30-59 ml/min (CMS/HCC)    Tobacco use disorder    Acute on chronic systolic heart failure (CMS/HCC)     Anemia of renal disease     PLAN:  O2 support titrated to maintain a saturation of 88 to 91%  Currently alternating precision flow with AVAPS as tolerated  Continue anticoagulation for now  Nephrology following closely, plan for HD today  Volume management per nephrology  Bronchodilator  Inhaled corticosteroids  Electrolytes/ glycemic control  DVT and GI prophylaxis.  Nutrition: P.o. diet and supplementation with boost.  Small amounts of intake as tolerated by saturations/O2 needs     8/16/2021  09:56 EDT     I personally have examined  and interviewed the patient. I have reviewed the history, data, problems, assessment and plan with our NP.  Critical care time in direct medical management (   ) minutes  Electronically signed by Marcelo Álvarez MD, D,ABS, 08/16/21, 9:32 PM EDT.

## 2021-08-16 NOTE — PLAN OF CARE
Goal Outcome Evaluation:      Patient transferred from ICU to [facility] room 262. Vitals stable, will continue to monitor.       Problem: Arrhythmia/Dysrhythmia  Goal: Normalized Cardiac Rhythm  8/16/2021 1547 by Charlene Foster RN  Outcome: Ongoing, Progressing  8/16/2021 1544 by Charlene Foster RN  Outcome: Ongoing, Progressing     Problem: Fall Injury Risk  Goal: Absence of Fall and Fall-Related Injury  8/16/2021 1547 by Charlene Foster RN  Outcome: Ongoing, Progressing  8/16/2021 1544 by Charlene Foster RN  Outcome: Ongoing, Progressing  Intervention: Identify and Manage Contributors to Fall Injury Risk  Recent Flowsheet Documentation  Taken 8/16/2021 1530 by Charlene Foster RN  Medication Review/Management:   medications reviewed   high-risk medications identified  Self-Care Promotion: independence encouraged  Intervention: Promote Injury-Free Environment  Recent Flowsheet Documentation  Taken 8/16/2021 1530 by Charlene Foster RN  Safety Promotion/Fall Prevention:   clutter free environment maintained   assistive device/personal items within reach   activity supervised   fall prevention program maintained   lighting adjusted   nonskid shoes/slippers when out of bed   room organization consistent   safety round/check completed     Problem: Adult Inpatient Plan of Care  Goal: Plan of Care Review  8/16/2021 1547 by Charlene Foster RN  Outcome: Ongoing, Progressing  Flowsheets (Taken 8/14/2021 0454 by Devin Del Real, RN)  Plan of Care Reviewed With: patient  8/16/2021 1544 by Charlene Foster RN  Outcome: Ongoing, Progressing  Flowsheets (Taken 8/14/2021 0454 by Devin Del Real, RN)  Plan of Care Reviewed With: patient  Goal: Patient-Specific Goal (Individualized)  8/16/2021 1547 by Charlene Foster RN  Outcome: Ongoing, Progressing  8/16/2021 1544 by Charlene Foster RN  Outcome: Ongoing, Progressing  Goal: Absence of Hospital-Acquired Illness or Injury  8/16/2021 1547 by Charlene Foster RN  Outcome: Ongoing,  Progressing  8/16/2021 1544 by Charlene Foster RN  Outcome: Ongoing, Progressing  Intervention: Identify and Manage Fall Risk  Recent Flowsheet Documentation  Taken 8/16/2021 1530 by Charlene Foster RN  Safety Promotion/Fall Prevention:   clutter free environment maintained   assistive device/personal items within reach   activity supervised   fall prevention program maintained   lighting adjusted   nonskid shoes/slippers when out of bed   room organization consistent   safety round/check completed  Intervention: Prevent Skin Injury  Recent Flowsheet Documentation  Taken 8/16/2021 1530 by Charlene Foster RN  Body Position:   turned   supine  Skin Protection:   adhesive use limited   incontinence pads utilized   transparent dressing maintained   tubing/devices free from skin contact  Intervention: Prevent Infection  Recent Flowsheet Documentation  Taken 8/16/2021 1530 by Charlene Foster RN  Infection Prevention:   hand hygiene promoted   personal protective equipment utilized  Goal: Optimal Comfort and Wellbeing  8/16/2021 1547 by Charlene Foster RN  Outcome: Ongoing, Progressing  8/16/2021 1544 by Charlene Foster RN  Outcome: Ongoing, Progressing  Intervention: Provide Person-Centered Care  Recent Flowsheet Documentation  Taken 8/16/2021 1530 by Charlene Foster RN  Trust Relationship/Rapport:   care explained   choices provided   emotional support provided   questions answered   questions encouraged   thoughts/feelings acknowledged  Goal: Readiness for Transition of Care  8/16/2021 1547 by Charlene Foster RN  Outcome: Ongoing, Progressing  8/16/2021 1544 by Charlene Foster RN  Outcome: Ongoing, Progressing     Problem: Skin Injury Risk Increased  Goal: Skin Health and Integrity  8/16/2021 1547 by Charlene Foster RN  Outcome: Ongoing, Progressing  8/16/2021 1544 by Charlene Foster RN  Outcome: Ongoing, Progressing  Intervention: Optimize Skin Protection  Recent Flowsheet Documentation  Taken 8/16/2021 1530  by O'Madera, Charlene, RN  Pressure Reduction Techniques:   frequent weight shift encouraged   weight shift assistance provided  Head of Bed (HOB): HOB elevated  Pressure Reduction Devices:   positioning supports utilized   pressure-redistributing mattress utilized  Skin Protection:   adhesive use limited   incontinence pads utilized   transparent dressing maintained   tubing/devices free from skin contact     Problem: Breathing Pattern Ineffective  Goal: Effective Breathing Pattern  8/16/2021 1547 by Charlene Foster RN  Outcome: Ongoing, Progressing  8/16/2021 1544 by Charlene Foster RN  Outcome: Ongoing, Progressing  Intervention: Promote Improved Breathing Pattern  Recent Flowsheet Documentation  Taken 8/16/2021 1530 by Charlene Foster RN  Supportive Measures:   active listening utilized   self-care encouraged  Head of Bed (HOB): HOB elevated     Problem: Breathing Pattern Ineffective  Goal: Effective Breathing Pattern  8/16/2021 1547 by Charlene Foster RN  Outcome: Ongoing, Progressing  8/16/2021 1544 by Charlene Foster RN  Outcome: Ongoing, Progressing  Intervention: Promote Improved Breathing Pattern  Recent Flowsheet Documentation  Taken 8/16/2021 1530 by Charlene Foster RN  Supportive Measures:   active listening utilized   self-care encouraged  Head of Bed (HOB): HOB elevated     Problem: Noninvasive Ventilation Acute  Goal: Effective Unassisted Ventilation and Oxygenation  8/16/2021 1547 by Charlene Foster RN  Outcome: Ongoing, Progressing  8/16/2021 1544 by Charlene Foster RN  Outcome: Ongoing, Progressing     Problem: Fluid Volume Excess  Goal: Fluid Balance  8/16/2021 1547 by Charlene Foster RN  Outcome: Ongoing, Progressing  8/16/2021 1544 by Charlene Foster RN  Outcome: Ongoing, Progressing  Intervention: Monitor and Manage Hypervolemia  Recent Flowsheet Documentation  Taken 8/16/2021 1530 by Charlene Foster RN  Skin Protection:   adhesive use limited   incontinence pads utilized   transparent  dressing maintained   tubing/devices free from skin contact

## 2021-08-16 NOTE — PLAN OF CARE
Problem: Adult Inpatient Plan of Care  Goal: Plan of Care Review  Flowsheets  Taken 8/16/2021 1438 by Allie Tamayo, PT  Outcome Summary: Pt demonstrated improved activity tolerance/mobility and able to transfer to recliner this date requiring min A for bed mobility and min A for stand pivot sit transfer from EOB to recliner. Pt on 25 LPM, 80% precision flow with Sp02 >90% throughout session, however, with SOA during mobility. Session limited by arrival of pt's lunch, but pt agreeable to participate and transfer to recliner. Continue seeing 5x/week at [facility] for progression of mobility and recommending IP rehab at d/c. PPE: gloves, mask, safety glasses  Plan of Care Reviewed With: patient

## 2021-08-16 NOTE — THERAPY TREATMENT NOTE
Subjective: Pt agreeable to therapeutic plan of care. Pt's lunch arrives at start of session; pt agreeable to transfer to recliner to sit up and eat lunch.    Objective:     Bed mobility - Min-A (supine to sit transfer)  Transfers - Min-A and with rolling walker (stand pivot sit transfer)  Ambulation - N/A or Not attempted.    Pain: 3/10 VAS chronic LBP   Education: Provided education on importance of mobility and skilled verbal / tactile cueing throughout intervention.     Assessment: Pt demonstrated improved activity tolerance/mobility and able to transfer to recliner this date requiring min A for bed mobility and min A for stand pivot sit transfer from EOB to recliner. Pt on 25 LPM, 80% precision flow with Sp02 >90% throughout session, however, with SOA during mobility. Session limited by arrival of pt's lunch, but pt agreeable to participate and transfer to recliner. Continue seeing 5x/week at [facility] for progression of mobility and recommending IP rehab at d/c. PPE: gloves, mask, safety glasses    Plan/Recommendations:   Pt would benefit from Inpatient Rehabilitation placement at discharge from facility and requires no DME at discharge.   Pt desires Inpatient Rehabilitation placement at discharge. Pt cooperative; agreeable to therapeutic recommendations and plan of care.     Basic Mobility 6-click:  Rollin = Total, A lot = 2, A little = 3; 4 = None  Supine>Sit:   1 = Total, A lot = 2, A little = 3; 4 = None   Sit>Stand with arms:  1 = Total, A lot = 2, A little = 3; 4 = None  Bed>Chair:   1 = Total, A lot = 2, A little = 3; 4 = None  Ambulate in room:  1 = Total, A lot = 2, A little = 3; 4 = None  3-5 Steps with railin = Total, A lot = 2, A little = 3; 4 = None  Score: 15    [facility]: 5 = Severe disability (Requires constant nursing care and attention, bedridden, incontinent)    Post-Tx Position: Up in Chair, Alarms activated and Call light and personal items within reach  PPE: gloves,  surgical mask, eyewear protection

## 2021-08-16 NOTE — PROGRESS NOTES
Referring Provider: Angel Prajapati MD    Reason for follow-up:  Atrial fibrillation  Cardiomyopathy  Shortness of breath  Bradycardia  Respiratory insufficiency-patient was transferred to ICU for BiPAP.    Patient Care Team:  Samantha Zabala APRN as PCP - General  Samantha Zabala APRN as PCP - Family Medicine  Jose G Rm MD as Consulting Physician (Cardiology)    Subjective .    ROS  Unable to.  Patient is on BiPAP.  Patient is somnolent.    History  Past Medical History:   Diagnosis Date   • Appetite loss    • Brain bleed (CMS/HCC)    • Carpal tunnel syndrome on left     carpal tunnel on left hand   • CHF (congestive heart failure) (CMS/HCC)    • Diabetic neuropathy (CMS/HCC)    • DM2 (diabetes mellitus, type 2) (CMS/HCC)    • History of angina    • Hyperlipidemia    • Hypogonadism in male    • Obesity    • Sleep apnea    • Stroke (CMS/HCC)    • Unsteady gait        Past Surgical History:   Procedure Laterality Date   • ANGIOPLASTY      X2   • APPENDECTOMY     • CARDIAC CATHETERIZATION  11/13/2015   • CARDIAC CATHETERIZATION N/A 5/24/2021    Procedure: Left Heart Cath and coronary angiogram;  Surgeon: Jose G Rm MD;  Location: [facility] CATH INVASIVE LOCATION;  Service: Cardiovascular;  Laterality: N/A;   • CARDIAC CATHETERIZATION  5/24/2021    Procedure: Saphenous Vein Graft;  Surgeon: Jose G Rm MD;  Location: [facility] CATH INVASIVE LOCATION;  Service: Cardiovascular;;   • CARDIAC CATHETERIZATION N/A 5/24/2021    Procedure: Percutaneous Coronary Intervention;  Surgeon: Bernabe Zambrano MD;  Location: [facility] CATH INVASIVE LOCATION;  Service: Cardiology;  Laterality: N/A;   • CARDIAC CATHETERIZATION N/A 5/24/2021    Procedure: Stent NIELS coronary;  Surgeon: Bernabe Zambrano MD;  Location:  [facility] CATH INVASIVE LOCATION;  Service: Cardiology;  Laterality: N/A;   • CARDIAC CATHETERIZATION N/A 5/26/2021    Procedure: Percutaneous Coronary Intervention;  Surgeon: Bernabe Zambrano MD;  Location: [facility] CATH INVASIVE  LOCATION;  Service: Cardiovascular;  Laterality: N/A;   • CARDIAC CATHETERIZATION N/A 5/26/2021    Procedure: Stent NIELS bypass graft;  Surgeon: Bernabe Zambrano MD;  Location: [facility] CATH INVASIVE LOCATION;  Service: Cardiovascular;  Laterality: N/A;   • CARDIAC ELECTROPHYSIOLOGY PROCEDURE N/A 5/24/2021    Procedure: Temporary Pacemaker;  Surgeon: Bernabe Zambrano MD;  Location: [facility] CATH INVASIVE LOCATION;  Service: Cardiology;  Laterality: N/A;   • CARDIAC ELECTROPHYSIOLOGY PROCEDURE N/A 5/26/2021    Procedure: Temporary Pacemaker;  Surgeon: Bernabe Zambrano MD;  Location: [facility] CATH INVASIVE LOCATION;  Service: Cardiovascular;  Laterality: N/A;   • CARPAL TUNNEL RELEASE Left 04/29/2018    carpal tunnel- lt hand// other hand surgeries    • CATARACT EXTRACTION, BILATERAL  2002    Dr. Lux Acosta   • CHOLECYSTECTOMY     • COLON RESECTION  2005   • CORONARY ANGIOPLASTY     • CORONARY ANGIOPLASTY WITH STENT PLACEMENT  11/13/2015    PTCA stent to proximal in stent and mid to distal lad   • CORONARY ANGIOPLASTY WITH STENT PLACEMENT  09/16/2016    PTCA stent to mid lad and stent to vein graft to marginal   • CORONARY ARTERY BYPASS GRAFT  2005    @ [facility]   • CYST REMOVAL      cyst removed from scrotum   • FOOT SURGERY Right 07/17/2018   • FOOT SURGERY Left 02/04/2019   • TOE AMPUTATION Right     right toe removed D/T infected cut that went to the bone       Family History   Problem Relation Age of Onset   • Heart disease Mother    • Heart disease Father    • Diabetes Sister    • Heart disease Sister    • Diabetes Brother    • Mental illness Brother        Social History     Tobacco Use   • Smoking status: Former Smoker     Packs/day: 1.00     Years: 60.00     Pack years: 60.00     Types: Cigarettes   • Smokeless tobacco: Never Used   • Tobacco comment: Patient quit smoking 11/2020   Vaping Use   • Vaping Use: Never used   Substance Use Topics   • Alcohol use: No   • Drug use: Yes     Frequency: 1.0 times per week     Types:  Marijuana     Comment: socially        Medications Prior to Admission   Medication Sig Dispense Refill Last Dose   • albuterol sulfate  (90 Base) MCG/ACT inhaler Inhale 2 puffs Every 4 (Four) Hours As Needed.      • apixaban (ELIQUIS) 5 MG tablet tablet Take 5 mg by mouth Daily.      • aspirin 81 MG chewable tablet Chew 81 mg Daily.      • atorvastatin (LIPITOR) 40 MG tablet Take 40 mg by mouth Every Night.      • cholecalciferol (VITAMIN D3) 25 MCG (1000 UT) tablet Take 1,000 Units by mouth Daily.      • dextrose (GLUTOSE) 40 % gel Take  by mouth Every 1 (One) Hour As Needed for Low Blood Sugar.      • donepezil (ARICEPT) 10 MG tablet Take 10 mg by mouth Every Night.      • DULoxetine HCl (DRIZALMA) 60 MG capsule Take 60 mg by mouth Daily.      • gabapentin (NEURONTIN) 100 MG capsule Take 2 capsules by mouth 2 (two) times a day. 6 capsule 0    • hydrALAZINE (APRESOLINE) 50 MG tablet Take 50 mg by mouth 2 (two) times a day. Hold for SBP <110      • isosorbide mononitrate (IMDUR) 60 MG 24 hr tablet Take 60 mg by mouth 2 (Two) Times a Day.      • metoprolol succinate XL (TOPROL-XL) 100 MG 24 hr tablet Take 1 tablet by mouth Daily for 30 days. 30 tablet 0    • nitroglycerin (NITROSTAT) 0.4 MG SL tablet Place 1 tablet under the tongue Every 5 (Five) Minutes As Needed for Chest Pain (Only if SBP Greater Than 100). Take no more than 3 doses in 15 minutes. 30 tablet 12    • spironolactone (ALDACTONE) 25 MG tablet Take 1 tablet by mouth Daily for 30 days. 30 tablet 0    • tiotropium (Spiriva HandiHaler) 18 MCG per inhalation capsule Place 1 capsule into inhaler and inhale Daily. 30 capsule 1    • vitamin B-12 (CYANOCOBALAMIN) 1000 MCG tablet Take 1,000 mcg by mouth Daily.      • [DISCONTINUED] furosemide (LASIX) 40 MG tablet Take 1 tablet by mouth 2 (Two) Times a Day for 30 days. 60 tablet 0        Allergies  Codeine    Scheduled Meds:amiodarone, 200 mg, Oral, Q12H  apixaban, 5 mg, Oral, Q12H  aspirin, 81 mg, Oral,  "Daily  atorvastatin, 40 mg, Oral, Nightly  budesonide, 0.5 mg, Nebulization, BID - RT  carvedilol, 6.25 mg, Oral, BID With Meals  cholecalciferol, 1,000 Units, Oral, Daily  dilTIAZem CD, 240 mg, Oral, Q24H  donepezil, 10 mg, Oral, Nightly  DULoxetine, 60 mg, Oral, Daily  epoetin izabella-epbx, 20,000 Units, Subcutaneous, Weekly  hydrALAZINE, 50 mg, Oral, Q12H  insulin lispro, 0-14 Units, Subcutaneous, TID AC  ipratropium-albuterol, 3 mL, Nebulization, Q4H - RT  isosorbide mononitrate, 60 mg, Oral, BID - Nitrates  metOLazone, 5 mg, Oral, Daily  sodium chloride, 10 mL, Intravenous, Q12H  vitamin B-12, 1,000 mcg, Oral, Daily      Continuous Infusions:bumetanide, 1 mg/hr, Last Rate: Stopped (08/14/21 2015)      PRN Meds:.•  acetaminophen **OR** acetaminophen **OR** acetaminophen  •  aluminum-magnesium hydroxide-simethicone  •  dextrose  •  dextrose  •  glucagon (human recombinant)  •  heparin (porcine)  •  insulin lispro **AND** insulin lispro  •  melatonin  •  nitroglycerin  •  ondansetron **OR** ondansetron  •  [COMPLETED] Insert peripheral IV **AND** sodium chloride  •  sodium chloride    Objective     VITAL SIGNS  Vitals:    08/16/21 0331 08/16/21 0400 08/16/21 0401 08/16/21 0441   BP: 100/41  117/60    Pulse:  76 67    Resp:       Temp:  97.9 °F (36.6 °C)     TempSrc:  Axillary     SpO2:  100% 100%    Weight:    108 kg (238 lb 12.1 oz)   Height:           Flowsheet Rows      First Filed Value   Admission Height  180.3 cm (71\") Documented at 08/03/2021 1733   Admission Weight  113 kg (250 lb) Documented at 08/03/2021 1733            Intake/Output Summary (Last 24 hours) at 8/16/2021 0645  Last data filed at 8/16/2021 0441  Gross per 24 hour   Intake 480 ml   Output 525 ml   Net -45 ml        TELEMETRY: Atrial fibrillation with controlled ventricular response.    Physical Exam:  The patient is somnolent.  Patient is on BiPAP  Vital signs as noted above.  Exogenous obesity.  Head and neck revealed no carotid bruits or " jugular venous distention.  No thyromegaly or lymphadenopathy is present  Lungs clear.  No wheezing.  Breath sounds are normal bilaterally.  Heart normal first and second heart sounds.  No murmur. No precordial rub is present.  No gallop is present.  Abdomen soft and nontender.  No organomegaly is present.  Extremities with good peripheral pulses without any pedal edema.  Skin warm and dry.  Musculoskeletal system is grossly normal  CNS-somnolent.    Results Review:   I reviewed the patient's new clinical results.  Lab Results (last 24 hours)     Procedure Component Value Units Date/Time    CBC & Differential [[id number]]  (Abnormal) Collected: 08/16/21 0436    Specimen: Blood Updated: 08/16/21 0501    Narrative:      The following orders were created for panel order CBC & Differential.  Procedure                               Abnormality         Status                     ---------                               -----------         ------                     CBC Auto Differential[[id number]]        Abnormal            Final result                 Please view results for these tests on the individual orders.    CBC Auto Differential [[id number]]  (Abnormal) Collected: 08/16/21 0436    Specimen: Blood Updated: 08/16/21 0501     WBC 7.10 10*3/mm3      RBC 2.26 10*6/mm3      Hemoglobin 6.6 g/dL      Hematocrit 19.5 %      MCV 86.4 fL      MCH 29.2 pg      MCHC 33.8 g/dL      RDW 16.2 %      RDW-SD 49.0 fl      MPV 7.9 fL      Platelets 301 10*3/mm3      Neutrophil % 55.9 %      Lymphocyte % 25.5 %      Monocyte % 11.9 %      Eosinophil % 6.5 %      Basophil % 0.2 %      Neutrophils, Absolute 3.90 10*3/mm3      Lymphocytes, Absolute 1.80 10*3/mm3      Monocytes, Absolute 0.80 10*3/mm3      Eosinophils, Absolute 0.50 10*3/mm3      Basophils, Absolute 0.00 10*3/mm3      nRBC 0.1 /100 WBC     Renal Function Panel [[id number]]  (Abnormal) Collected: 08/16/21 0308    Specimen: Blood Updated: 08/16/21 0423     Glucose 167 mg/dL       BUN 57 mg/dL      Creatinine 2.45 mg/dL      Sodium 136 mmol/L      Potassium 3.5 mmol/L      Chloride 93 mmol/L      CO2 32.0 mmol/L      Calcium 8.4 mg/dL      Albumin 3.20 g/dL      Phosphorus 3.5 mg/dL      Anion Gap 11.0 mmol/L      BUN/Creatinine Ratio 23.3     eGFR Non African Amer 26 mL/min/1.73     Narrative:      GFR Normal >60  Chronic Kidney Disease <60  Kidney Failure <15      POC Glucose Once [[id number]]  (Abnormal) Collected: 08/15/21 1720    Specimen: Blood Updated: 08/15/21 1722     Glucose 193 mg/dL      Comment: Serial Number: [id number]:  [id number]       POC Glucose Once [[id number]]  (Abnormal) Collected: 08/14/21 0422    Specimen: Blood Updated: 08/15/21 1607     Glucose 243 mg/dL      Comment: Serial Number: [id number]:  [id number]       POC Glucose Once [[id number]]  (Abnormal) Collected: 08/15/21 1208    Specimen: Blood Updated: 08/15/21 1210     Glucose 252 mg/dL      Comment: Serial Number: [id number]:  [id number]             Imaging Results (Last 24 Hours)     Procedure Component Value Units Date/Time    XR Chest 1 View [[id number]] Resulted: 08/16/21 0552     Updated: 08/16/21 0553      LAB RESULTS (LAST 7 DAYS)    CBC  Results from last 7 days   Lab Units 08/16/21  0436 08/14/21  0440 08/14/21  0432 08/14/21  0412 08/13/21  0321   WBC 10*3/mm3 7.10  --   --  10.30 7.70   RBC 10*6/mm3 2.26*  --   --  2.79* 2.54*   HEMOGLOBIN g/dL 6.6*  --   --  8.1* 7.5*   HEMOGLOBIN, POC g/dL  --  10.7* 8.3*  --   --    HEMATOCRIT % 19.5*  --   --  24.3* 22.1*   HEMATOCRIT POC %  --  31* 25*  --   --    MCV fL 86.4  --   --  86.9 87.1   PLATELETS 10*3/mm3 301  --   --  309 279       BMP  Results from last 7 days   Lab Units 08/16/21  0308 08/15/21  0447 08/14/21  0800 08/14/21  0412 08/13/21  0734 08/13/21  0321 08/12/21  0432 08/11/21  1253 08/11/21  0745 08/10/21  1249 08/10/21  0221   SODIUM mmol/L 136 133*  --  134*  --  133* 132*  --  136  --  132*   POTASSIUM mmol/L 3.5 3.4*  4.3 4.5 4.3 4.4  4.4 4.7  4.7   < > 5.4*  5.4*   < > 5.7*   CHLORIDE mmol/L 93* 91*  --  88*  --  89* 89*  --  92*  --  89*   CO2 mmol/L 32.0* 32.0*  --  33.0*  --  33.0* 33.0*  --  34.0*  --  33.0*   BUN mg/dL 57* 45*  --  87*  --  82* 77*  --  72*  --  71*   CREATININE mg/dL 2.45* 1.70*  --  2.73*  --  2.95* 2.93*  --  2.81*  --  2.67*   GLUCOSE mg/dL 167* 149*  --  182*  --  162* 161*  --  183*  --  101*   MAGNESIUM mg/dL  --   --   --  2.2  --  2.2  --   --   --   --   --    PHOSPHORUS mg/dL 3.5 2.7  --  5.4*  --  5.3* 5.3*  --  5.7*  --   --     < > = values in this interval not displayed.       CMP   Results from last 7 days   Lab Units 08/16/21  0308 08/15/21  0447 08/14/21  0800 08/14/21  0412 08/13/21  0734 08/13/21  0321 08/12/21  0432 08/11/21  1253 08/11/21  0745 08/10/21  1249 08/10/21  0221   SODIUM mmol/L 136 133*  --  134*  --  133* 132*  --  136  --  132*   POTASSIUM mmol/L 3.5 3.4* 4.3 4.5 4.3 4.4  4.4 4.7  4.7   < > 5.4*  5.4*   < > 5.7*   CHLORIDE mmol/L 93* 91*  --  88*  --  89* 89*  --  92*  --  89*   CO2 mmol/L 32.0* 32.0*  --  33.0*  --  33.0* 33.0*  --  34.0*  --  33.0*   BUN mg/dL 57* 45*  --  87*  --  82* 77*  --  72*  --  71*   CREATININE mg/dL 2.45* 1.70*  --  2.73*  --  2.95* 2.93*  --  2.81*  --  2.67*   GLUCOSE mg/dL 167* 149*  --  182*  --  162* 161*  --  183*  --  101*   ALBUMIN g/dL 3.20* 3.50  --  3.80  --  3.60 3.60  --  3.70  --   --     < > = values in this interval not displayed.         BNP        TROPONIN        CoAg  Results from last 7 days   Lab Units 08/14/21  0441   INR  1.17*   APTT seconds 33.4*       Creatinine Clearance  Estimated Creatinine Clearance: 32.9 mL/min (A) (by C-G formula based on SCr of 2.45 mg/dL (H)).    ABG  Results from last 7 days   Lab Units 08/14/21  1641 08/14/21  0626 08/14/21  0440 08/14/21  0432 08/11/21  0833 08/10/21  2240 08/10/21  2122   PH, ARTERIAL pH units 7.444 7.294* 7.266* 7.270* 7.361 7.342* 7.295*   PCO2, ARTERIAL mm Hg  49.7* 72.5* 75.5* 78.3* 55.7* 66.1* 74.1*   PO2 ART mm Hg 173.2* 193.8* 97.8 50.4* 42.8* 108.5* 93.3   O2 SATURATION ART % 99.6* 99.5* 96.0 77.7* 75.2* 97.6 95.8   BASE EXCESS ART mmol/L 9.0* 7.2* 5.5* 7.6* 5.4* 8.8* 8.0*       Radiology  XR Chest 1 View    Result Date: 8/14/2021  Right internal jugular CVC catheter with the tip in the proximal SVC. No pneumothorax. Bibasilar airspace disease is present with small bilateral pleural effusions which appears to have improved as compared to the previous study.  Electronically Signed By-Angelica Neves MD On:8/14/2021 5:25 PM This report was finalized on [id number] by  Angelica Neves MD.              EKG              I personally viewed and interpreted the patient's EKG/Telemetry data: Atrial fibrillation  ECHOCARDIOGRAM:    Results for orders placed during the hospital encounter of 07/20/21    Adult Transthoracic Echo Complete With Contrast if Necessary Per Protocol    Interpretation Summary  · Estimated left ventricular EF = 30% Left ventricular systolic function is moderately decreased.    Occasions  Shortness of breath.    Technically satisfactory study.  Mitral valve is structurally normal.  Mild mitral regurgitation.  Tricuspid valve is structurally normal.  Aortic valve is structurally normal.  Pulmonic valve could not be well visualized.  No evidence for tricuspid or aortic regurgitation is seen by Doppler study.  Biatrial and biventricular enlargement is present.  Diffuse left ventricular hypocontractility with ejection fraction of 30%.  Atrial septum is intact.  Aorta is normal.  No pericardial effusion or intracardiac thrombus is seen.    Impression  Mild mitral regurgitation.  Significant biatrial and biventricular enlargement.  Diffuse left ventricular hypocontractility with ejection fraction of 30%.  No pericardial effusion or intracardiac thrombus is seen.          STRESS MYOVIEW:    Cardiolite (Tc-99m Sestamibi) stress test    CARDIAC  CATHETERIZATION:            OTHER:         Assessment/Plan     Principal Problem:    Atrial fibrillation with RVR (CMS/HCC)  Active Problems:    S/P CABG (coronary artery bypass graft)    Essential hypertension    Elevated troponin    ANNETTE treated with BiPAP    Mixed hyperlipidemia    Flaccid hemiplegia of right dominant side as late effect of cerebral infarction (CMS/HCC)    Diabetic neuropathy (CMS/HCC)    Unspecified dementia with behavioral disturbance (CMS/HCC)    Coronary artery disease    Chronic venous hypertension (idiopathic) with ulcer and inflammation of bilateral lower extremity (CMS/HCC)    Chronic obstructive pulmonary disease, unspecified (CMS/HCC)    Chronic foot ulcer (CMS/HCC)    CKD (chronic kidney disease) stage 3, GFR 30-59 ml/min (CMS/[facility])    Tobacco use disorder    Acute on chronic systolic heart failure (CMS/[facility])    Anemia of renal disease      [[[[[[[[[[[[[[[[[[[[[[[[[    Impression  ==============  -Increased shortness of breath and palpitations     -Recent acute on chronic congestive heart failure.  Compensated at this time  Recent echocardiogram showed left ventricle dysfunction with ejection fraction of 35%.     -History of right-sided weakness with left MCA territory stroke.-Improved      -status post CABG 2005. status post stent to LAD 2009    Status post stent to LAD 11/13/2015  Status post stent to mid LAD and SVG to marginal branch 09/16/2016.  Status post stent to mid LAD 5/24/2021  Status post stent to SVG to RCA 5/26/2021     Cardiac catheterization 5/24/2021 revealed  Left ventricle angiogram was not performed due to pre-existing renal dysfunction.  Left main coronary artery normal.  Left anterior descending artery has mid segment lengthy 90% disease within the previously placed stent.  Distal left into descending artery has diffuse disease.  Circumflex coronary artery is totally occluded.  (Chronic)  Right coronary artery is chronically occluded.  SVG to marginal branch (jump  graft to OM1 and OM 2) is totally occluded (new finding compared to 2015.  SVG to PDA has distal radiolucency.  However no definite obstructive disease is present.       -status post myocardial infarction 2000 and 2002 .      -history of intermittent atrial fibrillation-maintaining sinus rhythm     Transesophageal echocardiogram 11/30/2020.  Biatrial enlargement.  Smoking effect in the left atrium and left ventricle and left atrial appendage.  Mild mitral and aortic regurgitation.  Left ventricle enlargement with diffuse hypocontractility with ejection fraction of 35 to 40%.     Echocardiogram 11/27/2020 revealed left atrial enlargement left ventricle dysfunction with ejection fraction of 40%.  Negative bubble study.      -diabetes hypertension and sleep apnea in history of renal dysfunction .  Recent BUN/creatinine 38/1.36     -status post colon surgery (partial colectomy) appendectomy cholecystectomy right 4th toe removal and carpal tunnel surgery      -continued smoking 1 pack per day -abstinence from smoking      -allergy to codeine.  ============   Plan  ================  Atrial fibrillation-chronic  Rate is controlled.    Respiratory insufficiency.  Patient was transferred to intensive care unit.  Patient is on BiPAP.    Significant renal dysfunction-chronic  Patient is on dialysis now.    Anemia  Hemoglobin 6.9.  Patient is receiving PRBC.     Status post CABG  Patient is not having any angina pectoris  Status post stent to LAD 5/24/2021.  Status post stent to SVG to RCA 5/26/2021.       Anticoagulation  Patient was on Eliquis.    Have discussed with attending nurse for coordination of care.    Further plan will depend on patient's progress.   ]]]]]]]]]]]]]]]]]]]]]]           Jose G Rm MD  08/16/21  06:45 EDT

## 2021-08-16 NOTE — PROGRESS NOTES
"RENAL/KCC:     LOS: 12 days    Patient Care Team:  Samantha Zabala APRN as PCP - General  Samantha Zabala APRN as PCP - Family Medicine  Jose G Rm MD as Consulting Physician (Cardiology)    Chief Complaint:  Palpitations    Subjective     Interval History:   Remains in the ICU, on 80% FiO2    Objective     Vital Sign Min/Max for last 24 hours  Temp  Min: 97.3 °F (36.3 °C)  Max: 98.4 °F (36.9 °C)   BP  Min: 83/35  Max: 125/65   Pulse  Min: 56  Max: 81   Resp  Min: 18  Max: 23   SpO2  Min: 89 %  Max: 100 %   Flow (L/min)  Min: 25  Max: 40   Weight  Min: 108 kg (238 lb 12.1 oz)  Max: 108 kg (238 lb 12.1 oz)     Flowsheet Rows      First Filed Value   Admission Height  180.3 cm (71\") Documented at 08/03/2021 1733   Admission Weight  113 kg (250 lb) Documented at 08/03/2021 1733          I/O this shift:  In: 350 [Blood:350]  Out: 300 [Urine:300]  I/O last 3 completed shifts:  In: 1090 [P.O.:960; I.V.:98; IV Piggyback:32]  Out: 1750 [Urine:650; Other:1100]    Physical Exam:  GEN: Awake, NAD  ENT: PERRL, EOMI, MMM  NECK: Supple, no JVD  CHEST: CTAB, no W/R/C  CV: RRR, no M/G/R  ABD: Soft, NT, +BS  SKIN: Warm and Dry  NEURO: CN's intact      WBC WBC   Date Value Ref Range Status   08/16/2021 7.20 3.40 - 10.80 10*3/mm3 Final   08/16/2021 7.10 3.40 - 10.80 10*3/mm3 Final   08/14/2021 10.30 3.40 - 10.80 10*3/mm3 Final      HGB Hemoglobin   Date Value Ref Range Status   08/16/2021 7.6 (L) 13.0 - 17.7 g/dL Final   08/16/2021 7.6 (L) 13.0 - 17.7 g/dL Final   08/16/2021 6.6 (C) 13.0 - 17.7 g/dL Final   08/14/2021 10.7 (L) 12.0 - 17.0 g/dL Final   08/14/2021 8.3 (L) 12.0 - 17.0 g/dL Final   08/14/2021 8.1 (L) 13.0 - 17.7 g/dL Final      HCT Hematocrit   Date Value Ref Range Status   08/16/2021 22.9 (L) 37.5 - 51.0 % Final   08/16/2021 22.9 (L) 37.5 - 51.0 % Final   08/16/2021 19.5 (C) 37.5 - 51.0 % Final   08/14/2021 31 (L) 38 - 51 % Final   08/14/2021 25 (L) 38 - 51 % Final   08/14/2021 24.3 (L) 37.5 - 51.0 % Final    "   Platlets No results found for: LABPLAT   MCV MCV   Date Value Ref Range Status   08/16/2021 86.2 79.0 - 97.0 fL Final   08/16/2021 86.4 79.0 - 97.0 fL Final   08/14/2021 86.9 79.0 - 97.0 fL Final          Sodium Sodium   Date Value Ref Range Status   08/16/2021 136 136 - 145 mmol/L Final   08/15/2021 133 (L) 136 - 145 mmol/L Final   08/14/2021 134 (L) 136 - 145 mmol/L Final      Potassium Potassium   Date Value Ref Range Status   08/16/2021 3.5 3.5 - 5.2 mmol/L Final   08/15/2021 3.4 (L) 3.5 - 5.2 mmol/L Final   08/14/2021 4.3 3.5 - 5.2 mmol/L Final   08/14/2021 4.5 3.5 - 5.2 mmol/L Final      Chloride Chloride   Date Value Ref Range Status   08/16/2021 93 (L) 98 - 107 mmol/L Final   08/15/2021 91 (L) 98 - 107 mmol/L Final   08/14/2021 88 (L) 98 - 107 mmol/L Final      CO2 CO2   Date Value Ref Range Status   08/16/2021 32.0 (H) 22.0 - 29.0 mmol/L Final   08/15/2021 32.0 (H) 22.0 - 29.0 mmol/L Final   08/14/2021 33.0 (H) 22.0 - 29.0 mmol/L Final      BUN BUN   Date Value Ref Range Status   08/16/2021 57 (H) 8 - 23 mg/dL Final   08/15/2021 45 (H) 8 - 23 mg/dL Final   08/14/2021 87 (H) 8 - 23 mg/dL Final      Creatinine Creatinine   Date Value Ref Range Status   08/16/2021 2.45 (H) 0.76 - 1.27 mg/dL Final   08/15/2021 1.70 (H) 0.76 - 1.27 mg/dL Final   08/14/2021 2.73 (H) 0.76 - 1.27 mg/dL Final      Calcium Calcium   Date Value Ref Range Status   08/16/2021 8.4 (L) 8.6 - 10.5 mg/dL Final   08/15/2021 8.4 (L) 8.6 - 10.5 mg/dL Final   08/14/2021 9.0 8.6 - 10.5 mg/dL Final      PO4 No results found for: CAPO4   Albumin Albumin   Date Value Ref Range Status   08/16/2021 3.20 (L) 3.50 - 5.20 g/dL Final   08/15/2021 3.50 3.50 - 5.20 g/dL Final   08/14/2021 3.80 3.50 - 5.20 g/dL Final      Magnesium Magnesium   Date Value Ref Range Status   08/14/2021 2.2 1.6 - 2.4 mg/dL Final      Uric Acid No results found for: URICACID        Results Review:     I reviewed the patient's new clinical results.    amiodarone, 200 mg, Oral,  Q12H  [START ON 8/17/2021] apixaban, 5 mg, Oral, Q12H  aspirin, 81 mg, Oral, Daily  atorvastatin, 40 mg, Oral, Nightly  budesonide, 0.5 mg, Nebulization, BID - RT  bumetanide, 2 mg, Intravenous, Q12H  carvedilol, 6.25 mg, Oral, BID With Meals  cholecalciferol, 1,000 Units, Oral, Daily  dilTIAZem CD, 240 mg, Oral, Q24H  donepezil, 10 mg, Oral, Nightly  DULoxetine, 60 mg, Oral, Daily  epoetin izabella-epbx, 20,000 Units, Subcutaneous, Weekly  hydrALAZINE, 50 mg, Oral, Q12H  insulin lispro, 0-14 Units, Subcutaneous, TID AC  ipratropium-albuterol, 3 mL, Nebulization, Q4H - RT  isosorbide mononitrate, 60 mg, Oral, BID - Nitrates  metOLazone, 5 mg, Oral, Daily  sodium chloride, 10 mL, Intravenous, Q12H  vitamin B-12, 1,000 mcg, Oral, Daily           Medication Review: Reviewed    Assessment/Plan       Acute on chronic systolic heart failure (CMS/HCC)    S/P CABG (coronary artery bypass graft)    Essential hypertension    Elevated troponin    ANNETTE treated with BiPAP    Major depressive disorder, recurrent, mild (CMS/[facility])    Mixed hyperlipidemia    History of cerebrovascular accident    Flaccid hemiplegia of right dominant side as late effect of cerebral infarction (CMS/[facility])    Diabetic neuropathy (CMS/[facility])    Unspecified dementia with behavioral disturbance (CMS/[facility])    Coronary artery disease    Obesity    Vitamin D deficiency    Chronic venous hypertension (idiopathic) with ulcer and inflammation of bilateral lower extremity (CMS/[facility])    Chronic obstructive pulmonary disease, unspecified (CMS/[facility])    Chronic foot ulcer (CMS/[facility])    Paroxysmal atrial fibrillation (CMS/[facility])    Acute kidney injury (CMS/[facility])    Type 2 diabetes mellitus with hyperglycemia (CMS/[facility])    CKD (chronic kidney disease) stage 3, GFR 30-59 ml/min (CMS/[facility])    Anemia of renal disease    Acute respiratory failure with hypoxia and hypercapnia (CMS/[facility])      1. DILEEP  2. CKD III  3. Hyperkalemia  4. Pulmonary edema  5. Tachycardia  6. CHF  7. T2DM  8.  Hypertension  9. Anemia     PLAN:  Acute HD initiated over the weekend.  Plan HD with UF again today.  Will eventually need permcath if HD looks to be needed longterm.  Will reassess in AM.         Yony Bhat M.D.  [facility]

## 2021-08-16 NOTE — CASE MANAGEMENT/SOCIAL WORK
Continued Stay Note  DOREEN Amin     Patient Name: Benson Mclean  MRN: [id number]  Today's Date: 2021    Admit Date: 8/3/2021    Discharge Plan     Row Name 21 0927       Plan    Plan  D/C Plan : Return to [place] , [facility]  No PASRR required .    Plan Comments  Pt wa a fast call and is on conts AVAPS , a Bumex gtt and a shiley has been placed for dialysis .        Discharge Codes    No documentation.       Expected Discharge Date and Time     Expected Discharge Date Expected Discharge Time    Aug 12, 2021             Daphne Cervantes RN

## 2021-08-16 NOTE — PLAN OF CARE
Goal Outcome Evaluation:               Patient remains a falls risk. Patient encouraged to turn Q2. PT consulted for reply maneuver. Vitals stable, will continue to monitor.       Problem: Arrhythmia/Dysrhythmia  Goal: Normalized Cardiac Rhythm  Outcome: Ongoing, Progressing     Problem: Fall Injury Risk  Goal: Absence of Fall and Fall-Related Injury  Outcome: Ongoing, Progressing  Intervention: Identify and Manage Contributors to Fall Injury Risk  Recent Flowsheet Documentation  Taken 8/16/2021 1530 by Charlene Foster RN  Medication Review/Management:   medications reviewed   high-risk medications identified  Self-Care Promotion: independence encouraged  Intervention: Promote Injury-Free Environment  Recent Flowsheet Documentation  Taken 8/16/2021 1530 by Charlene Foster, RN  Safety Promotion/Fall Prevention:   clutter free environment maintained   assistive device/personal items within reach   activity supervised   fall prevention program maintained   lighting adjusted   nonskid shoes/slippers when out of bed   room organization consistent   safety round/check completed     Problem: Adult Inpatient Plan of Care  Goal: Plan of Care Review  Outcome: Ongoing, Progressing  Flowsheets (Taken 8/14/2021 0454 by Devin Del Real RN)  Plan of Care Reviewed With: patient  Goal: Patient-Specific Goal (Individualized)  Outcome: Ongoing, Progressing  Goal: Absence of Hospital-Acquired Illness or Injury  Outcome: Ongoing, Progressing  Intervention: Identify and Manage Fall Risk  Recent Flowsheet Documentation  Taken 8/16/2021 1530 by Charlene Foster RN  Safety Promotion/Fall Prevention:   clutter free environment maintained   assistive device/personal items within reach   activity supervised   fall prevention program maintained   lighting adjusted   nonskid shoes/slippers when out of bed   room organization consistent   safety round/check completed  Intervention: Prevent Skin Injury  Recent Flowsheet Documentation  Taken  8/16/2021 1530 by Charlene Foster RN  Body Position:   turned   supine  Skin Protection:   adhesive use limited   incontinence pads utilized   transparent dressing maintained   tubing/devices free from skin contact  Intervention: Prevent Infection  Recent Flowsheet Documentation  Taken 8/16/2021 1530 by Charlene Foster RN  Infection Prevention:   hand hygiene promoted   personal protective equipment utilized  Goal: Optimal Comfort and Wellbeing  Outcome: Ongoing, Progressing  Intervention: Provide Person-Centered Care  Recent Flowsheet Documentation  Taken 8/16/2021 1530 by Charlene Foster RN  Trust Relationship/Rapport:   care explained   choices provided   emotional support provided   questions answered   questions encouraged   thoughts/feelings acknowledged  Goal: Readiness for Transition of Care  Outcome: Ongoing, Progressing     Problem: Skin Injury Risk Increased  Goal: Skin Health and Integrity  Outcome: Ongoing, Progressing  Intervention: Optimize Skin Protection  Recent Flowsheet Documentation  Taken 8/16/2021 1530 by Charlene Foster RN  Pressure Reduction Techniques:   frequent weight shift encouraged   weight shift assistance provided  Head of Bed (HOB): HOB elevated  Pressure Reduction Devices:   positioning supports utilized   pressure-redistributing mattress utilized  Skin Protection:   adhesive use limited   incontinence pads utilized   transparent dressing maintained   tubing/devices free from skin contact     Problem: Breathing Pattern Ineffective  Goal: Effective Breathing Pattern  Outcome: Ongoing, Progressing  Intervention: Promote Improved Breathing Pattern  Recent Flowsheet Documentation  Taken 8/16/2021 1530 by Charlene Foster RN  Supportive Measures:   active listening utilized   self-care encouraged  Head of Bed (HOB): HOB elevated     Problem: Noninvasive Ventilation Acute  Goal: Effective Unassisted Ventilation and Oxygenation  Outcome: Ongoing, Progressing     Problem: Fluid Volume  Excess  Goal: Fluid Balance  Outcome: Ongoing, Progressing  Intervention: Monitor and Manage Hypervolemia  Recent Flowsheet Documentation  Taken 8/16/2021 1530 by Charlene Foster, RN  Skin Protection:   adhesive use limited   incontinence pads utilized   transparent dressing maintained   tubing/devices free from skin contact

## 2021-08-16 NOTE — CONSULTS
Nutrition Services    Patient Name: Benson Mclean  YOB: 1950  MRN: [id number]  Admission date: 8/3/2021    Comments:     Average PO intakes of 67%. Continue Boost Glucose Control qday. Approve up to 75 g of CHO per meal.    PPE Documentation        PPE Worn By Provider Surgical mask, eye protection   PPE Worn By Patient  None     CLINICAL NUTRITION ASSESSMENT      Reason for Assessment 8/16/21: full assessment s/p LOS short note   H&P:      Past Medical History:   Diagnosis Date   • Appetite loss    • Brain bleed (CMS/[facility])    • Carpal tunnel syndrome on left     carpal tunnel on left hand   • CHF (congestive heart failure) (CMS/[facility])    • Diabetic neuropathy (CMS/[facility])    • DM2 (diabetes mellitus, type 2) (CMS/[facility])    • History of angina    • Hyperlipidemia    • Hypogonadism in male    • Obesity    • Sleep apnea    • Stroke (CMS/[facility])    • Unsteady gait        Past Surgical History:   Procedure Laterality Date   • ANGIOPLASTY      X2   • APPENDECTOMY     • CARDIAC CATHETERIZATION  11/13/2015   • CARDIAC CATHETERIZATION N/A 5/24/2021    Procedure: Left Heart Cath and coronary angiogram;  Surgeon: Jose G Rm MD;  Location: [facility] CATH INVASIVE LOCATION;  Service: Cardiovascular;  Laterality: N/A;   • CARDIAC CATHETERIZATION  5/24/2021    Procedure: Saphenous Vein Graft;  Surgeon: Jose G Rm MD;  Location: [facility] CATH INVASIVE LOCATION;  Service: Cardiovascular;;   • CARDIAC CATHETERIZATION N/A 5/24/2021    Procedure: Percutaneous Coronary Intervention;  Surgeon: Bernabe Zambrano MD;  Location:  [facility] CATH INVASIVE LOCATION;  Service: Cardiology;  Laterality: N/A;   • CARDIAC CATHETERIZATION N/A 5/24/2021    Procedure: Stent NIELS coronary;  Surgeon: Bernabe Zambrano MD;  Location:  [facility] CATH INVASIVE LOCATION;  Service: Cardiology;  Laterality: N/A;   • CARDIAC CATHETERIZATION N/A 5/26/2021    Procedure: Percutaneous Coronary Intervention;  Surgeon: Bernabe Zambrano MD;  Location:  [facility] CATH  INVASIVE LOCATION;  Service: Cardiovascular;  Laterality: N/A;   • CARDIAC CATHETERIZATION N/A 5/26/2021    Procedure: Stent NIELS bypass graft;  Surgeon: Bernabe Zambrano MD;  Location:  [facility] CATH INVASIVE LOCATION;  Service: Cardiovascular;  Laterality: N/A;   • CARDIAC ELECTROPHYSIOLOGY PROCEDURE N/A 5/24/2021    Procedure: Temporary Pacemaker;  Surgeon: Bernabe Zambrano MD;  Location:  [facility] CATH INVASIVE LOCATION;  Service: Cardiology;  Laterality: N/A;   • CARDIAC ELECTROPHYSIOLOGY PROCEDURE N/A 5/26/2021    Procedure: Temporary Pacemaker;  Surgeon: Bernabe Zambrano MD;  Location: [facility] CATH INVASIVE LOCATION;  Service: Cardiovascular;  Laterality: N/A;   • CARPAL TUNNEL RELEASE Left 04/29/2018    carpal tunnel- lt hand// other hand surgeries    • CATARACT EXTRACTION, BILATERAL  2002    Dr. Lux Acosta   • CHOLECYSTECTOMY     • COLON RESECTION  2005   • CORONARY ANGIOPLASTY     • CORONARY ANGIOPLASTY WITH STENT PLACEMENT  11/13/2015    PTCA stent to proximal in stent and mid to distal lad   • CORONARY ANGIOPLASTY WITH STENT PLACEMENT  09/16/2016    PTCA stent to mid lad and stent to vein graft to marginal   • CORONARY ARTERY BYPASS GRAFT  2005    @ [facility]   • CYST REMOVAL      cyst removed from scrotum   • FOOT SURGERY Right 07/17/2018   • FOOT SURGERY Left 02/04/2019   • TOE AMPUTATION Right     right toe removed D/T infected cut that went to the bone        Current Problems:     Acute on chronic systolic heart failure   -dialysis/diuresis per nephrology  -cardiology following     Acute respiratory failure with hypoxia and hypercapnia   -secondary to fluid overload  -AVAPS PRN  -pulmonary following     Acute kidney injury, CKD (chronic kidney disease) stage 3, GFR 30-59 ml/min   -nephrology following  -Shiley inserted and hemodialysis initiated on 08/14/21  -HD planned for today (08/16/21)     Elevated troponin  -likely demand ischemia  -cardiology following     Paroxysmal atrial fibrillation  -rate  "controlled  -continue amiodarone, diltiazem CD, and Eliquis     Coronary artery disease, S/P CABG / Essential hypertension, chronic / Mixed hyperlipidemia     ANNETTE treated with BiPAP     Major depressive disorder, recurrent, mild  -on Cymbalta      History of cerebrovascular accident  Flaccid hemiplegia of right dominant side as late effect of cerebral infarction   -fall precautions     Type 2 diabetes mellitus with hyperglycemia complicated with Diabetic neuropathy   -A1c 7.4% on 07/21/21     Unspecified dementia with behavioral disturbance  -on donepezil      Obesity  -BMI 37.39     Vitamin D deficiency  -continue cholecalciferol      Chronic venous hypertension (idiopathic) with ulcer and inflammation of bilateral lower extremity, Chronic foot ulcer     Chronic obstructive pulmonary disease, unspecified, former smoker  -stable without exacerbation     Anemia of renal disease     Nutrition/Diet History         Narrative     8/16: Full assessment completed this date as short note only able to be completed at initial encounter. Pt reports he is sometimes hungry after meals - offered option of additional carbohydrate choice at each meal (75 g per meal), pt accepting of this. Pt reports he ate all of breakfast this AM, had not had ONS today. Encouraged to drink as snack. Denies N/V, chew/swallow difficulty.    Noted per chart review, RD visited on 8/13 for LOS, including this narrative:\" Visited patient in room for LOS. Patient eating well per I/O, pt states he is getting tired of the same meal options. Discussed other items patient could order as Ala carte. Patient's wife on speaker phone and able to speak with her as well. Reports prior po intake is variable. Sometimes doesn't eat well at the nursing home- has been offered supplements there. Will order Boost GC 1x daily at this time due to fluid restriction. Weight history discussed with wife, states patient has a history of fluid retention and weight loss in past " "was due to fluid loss.   Functional Status \"generalized weakness\"     Food Allergies NKFA     Anthropometrics        Current Height, Weight Height: 170.2 cm (67.01\")  Weight: 108 kg (238 lb 12.1 oz) (08/16/21 0441)        Admit Height, Weight     Flowsheet Rows      First Filed Value   Admission Height  180.3 cm (71\") Documented at 08/03/2021 1733   Admission Weight  113 kg (250 lb) Documented at 08/03/2021 1733             Ideal Body Weight (IBW) 148 lb   % Ideal Body Weight 161%       Usual Body Weight UTD   % Usual Body Weight UTD   Wt Change Observation Current Admission:  8/16: CBW down from admission but appears c/w fluid loss, negative 6 Liters from admit    PTA:  8/16: 8% decrease in 5  Months, lots of fluctuation in wt hx, RD previously s/w wife who reports this is fluid related   Weight Hx    Wt Readings from Last 30 Encounters:   08/16/21 0441 108 kg (238 lb 12.1 oz)   08/14/21 0820 113 kg (248 lb 3.8 oz)   08/12/21 0555 115 kg (254 lb 10.1 oz)   08/11/21 0559 115 kg (253 lb 8.5 oz)   08/10/21 0618 115 kg (253 lb 15.5 oz)   08/09/21 0609 113 kg (249 lb 1.9 oz)   08/08/21 0626 115 kg (253 lb 1.4 oz)   08/07/21 0612 116 kg (256 lb 9.9 oz)   08/05/21 0551 115 kg (253 lb 4.9 oz)   08/04/21 0115 116 kg (256 lb 2.8 oz)   08/03/21 1733 113 kg (250 lb)   08/01/21 0546 125 kg (276 lb 0.3 oz)   07/31/21 0519 124 kg (274 lb 7.6 oz)   07/29/21 0500 118 kg (261 lb 3.9 oz)   07/28/21 0514 117 kg (258 lb 6.1 oz)   07/27/21 0533 117 kg (257 lb 0.9 oz)   07/26/21 1620 115 kg (253 lb 8.5 oz)   07/24/21 0422 115 kg (252 lb 8 oz)   07/23/21 0342 116 kg (254 lb 13.6 oz)   07/22/21 0328 116 kg (256 lb 9.9 oz)   07/21/21 1405 114 kg (252 lb)   07/21/21 0145 114 kg (252 lb 3.3 oz)   07/20/21 2059 114 kg (251 lb)   07/02/21 0300 114 kg (250 lb 10.6 oz)   07/01/21 0344 113 kg (249 lb 9 oz)   06/30/21 0351 113 kg (248 lb 14.4 oz)   06/29/21 0600 113 kg (248 lb 10.9 oz)   06/28/21 0340 114 kg (251 lb 5.2 oz)   06/27/21 0438 111 kg " (245 lb 2.4 oz)   06/26/21 0442 116 kg (256 lb 9.9 oz)   06/26/21 0050 116 kg (255 lb 11.7 oz)   06/25/21 1926 113 kg (250 lb)   06/03/21 0500 119 kg (262 lb 12.6 oz)   06/02/21 0506 118 kg (260 lb 2.3 oz)   06/01/21 0528 121 kg (267 lb 13.7 oz)   05/31/21 0500 122 kg (269 lb 6.4 oz)   05/30/21 0500 121 kg (266 lb 8.6 oz)   05/29/21 0500 122 kg (268 lb 15.4 oz)   05/29/21 0305 122 kg (268 lb 15.4 oz)   05/28/21 0504 122 kg (268 lb 8.3 oz)   05/26/21 0449 118 kg (260 lb 12.9 oz)   05/24/21 0323 119 kg (262 lb 9.1 oz)   05/22/21 2100 125 kg (276 lb 3.2 oz)   05/21/21 0349 125 kg (276 lb 7.3 oz)   05/20/21 0439 127 kg (280 lb 6.8 oz)   05/19/21 0202 127 kg (281 lb 1.4 oz)   05/18/21 2219 122 kg (270 lb)   05/08/21 0511 116 kg (256 lb 13.4 oz)   05/07/21 0606 118 kg (261 lb)   05/07/21 0456 118 kg (261 lb 0.4 oz)   05/06/21 2304 118 kg (261 lb 0.4 oz)   05/06/21 1720 122 kg (270 lb)   03/16/21 1141 125 kg (276 lb)   11/30/20 0557 125 kg (276 lb 7.3 oz)   11/29/20 0615 126 kg (278 lb 7.1 oz)   11/28/20 0635 124 kg (273 lb 5.9 oz)   11/27/20 1611 120 kg (265 lb)   11/27/20 0801 120 kg (265 lb)   11/16/20 0918 120 kg (265 lb)   09/23/20 1047 128 kg (282 lb)   08/24/20 0617 128 kg (282 lb 3 oz)   08/23/20 1212 126 kg (276 lb 14.4 oz)   08/23/20 0932 120 kg (265 lb)   01/23/20 1100 129 kg (284 lb 8 oz)   08/07/19 1413 129 kg (284 lb)   07/26/19 1957 126 kg (278 lb 10.6 oz)   06/26/19 0832 130 kg (285 lb 9.6 oz)   06/22/19 2034 130 kg (287 lb)   06/05/19 1011 129 kg (285 lb)   05/15/19 0850 129 kg (285 lb)   04/24/19 0837 128 kg (283 lb)   04/03/19 0824 127 kg (280 lb)   03/20/19 1106 127 kg (279 lb)   03/06/19 1100 126 kg (278 lb)   02/20/19 0959 126 kg (278 lb)   01/28/19 0820 126 kg (278 lb)   01/15/19 0839 126 kg (278 lb)   12/26/18 1241 126 kg (278 lb)   12/11/18 0935 126 kg (278 lb)   11/29/18 1020 125 kg (275 lb)   11/13/18 1037 125 kg (275 lb)   11/08/18 1436 118 kg (260 lb)   10/22/18 0928 118 kg (260 lb)        BMI  kg/m2 Body mass index is 37.39 kg/m².       Labs/Medications         Pertinent Labs    Results from last 7 days   Lab Units 08/16/21  0308 08/15/21  0447 08/14/21  0800 08/14/21  0412 08/14/21 0412   SODIUM mmol/L 136 133*  --   --  134*   POTASSIUM mmol/L 3.5 3.4* 4.3   < > 4.5   CHLORIDE mmol/L 93* 91*  --   --  88*   CO2 mmol/L 32.0* 32.0*  --   --  33.0*   BUN mg/dL 57* 45*  --   --  87*   CREATININE mg/dL 2.45* 1.70*  --   --  2.73*   CALCIUM mg/dL 8.4* 8.4*  --   --  9.0   GLUCOSE mg/dL 167* 149*  --   --  182*    < > = values in this interval not displayed.     Results from last 7 days   Lab Units 08/16/21  0436 08/16/21 0308 08/14/21  0432 08/14/21 0412 08/14/21 0412 08/13/21  0321 08/13/21 0321   MAGNESIUM mg/dL  --   --   --   --  2.2  --  2.2   PHOSPHORUS mg/dL  --  3.5   < >  --  5.4*   < > 5.3*   HEMOGLOBIN g/dL 6.6*  --   --    < > 8.1*   < > 7.5*   HEMOGLOBIN, POC   --   --    < >  --   --   --   --    HEMATOCRIT % 19.5*  --   --    < > 24.3*   < > 22.1*   HEMATOCRIT POC   --   --    < >  --   --   --   --     < > = values in this interval not displayed.     COVID19   Date Value Ref Range Status   08/03/2021 Not Detected Not Detected - Ref. Range Final     Lab Results   Component Value Date    HGBA1C 7.4 (H) 07/21/2021         Pertinent Medications ASA, lipitor, bumex, vitamin D3, aricept, cymbalta, retacrit, admelog, zaroxolyn, vitamin B-12     Physical Findings        Overall Physical   Appearance, MSA 8/16: visually appears c/w last encounter    8/13: NFPE completed. Some muscle loss is noted to BLE, though patient is mostly in his W/C.   --  Edema  1+ generalized edema  2+ BL ankles     Gastrointestinal BM documented on 8/15     Tubes N/A     Oral/Mouth Cavity Teeth missing     Skin No current wounds documented     --  Current Nutrition Orders & Evaluation of Intake       Oral Nutrition     Current PO Diet Diet Cardiac, Diabetic/Consistent Carbs; Healthy Heart; Diabetic - Consistent Carb;  Fluid Restriction; 1500cc/day   Supplement Boost Glucose Control qday with breakfast   PO Evaluation     % PO Intake 8/16: Average meal intakes of 67% x26 meals documented this admit   --  Clinical Course    Nutrition Course Details PO Diet:  [facility]4 [facility]  [facility] to present (8/16) [facility] 1500 mL fluird restriction       Nutritional Risk Screening        NRS-2002 Score   -       Nutrition Diagnosis         Nutrition Dx Problem 1 Inadequate oral intake r/t decreased ability to consume sufficient energy AEB average meal intakes of 67%       Nutrition Dx Problem 2 -       Intervention Goal         Intervention Goal(s) PO intakes increase >75% of meals    Accepting of ONS     Nutrition Intervention        RD/Tech Action Continue Boost Glucose Control qday    Approve up to 75 g of CHO per meal     Nutrition Prescription          Diet Prescription [facility] [place],    Supplement Prescription Boost Glucose control qAM   --  Monitor/Evaluation        Monitor PO intakes, BMs, N/V, abd pain/distention, labs (electrolytes, BUN/Crt, glucose), wt for changes, skin integrity, at next encounter.       Electronically signed by:  Phoebe Kauffman RD  08/16/21 09:25 EDT

## 2021-08-16 NOTE — PROGRESS NOTES
[facility] Medicine Services Daily Progress Note    Patient Name: Benson Mclean  : 1950  MRN: [id number]  Primary Care Physician:  Samantha Zabala APRN  Date of admission: 8/3/2021      Subjective      Chief Complaint:  Shortness of breath      Patient reports shortness of breath with exertion. He is also complaining about his diet being the same foods for all three meals. Dietary restrictions reinforced. Patient verbalizes understanding.    Review of Systems   Respiratory: Positive for shortness of breath.    All other systems reviewed and are negative.         Objective      Vitals:   Temp:  [97.3 °F (36.3 °C)-98.4 °F (36.9 °C)] 97.3 °F (36.3 °C)  Heart Rate:  [56-81] 58  Resp:  [18-23] 18  BP: ()/(35-65) 101/46  Flow (L/min):  [25-40] 25    Physical Exam  Vitals reviewed.   Constitutional:       Appearance: He is obese.   HENT:      Head: Normocephalic.   Eyes:      Extraocular Movements: Extraocular movements intact.      Conjunctiva/sclera: Conjunctivae normal.      Pupils: Pupils are equal, round, and reactive to light.   Cardiovascular:      Rate and Rhythm: Bradycardia present. Rhythm irregularly irregular.      Pulses:           Dorsalis pedis pulses are 2+ on the right side and 2+ on the left side.        Posterior tibial pulses are 2+ on the right side and 2+ on the left side.   Pulmonary:      Effort: Pulmonary effort is normal.      Breath sounds: Examination of the right-lower field reveals rales. Examination of the left-lower field reveals rales. Rales present.      Comments: O2 per Precision Flow @ 25 L / 80%   Abdominal:      General: Bowel sounds are normal. There is no distension.      Palpations: Abdomen is soft.      Tenderness: There is no abdominal tenderness.   Musculoskeletal:      Cervical back: Normal range of motion.      Right lower le+ Edema present.      Left lower le+ Edema present.   Skin:     General: Skin is warm and dry.       Capillary Refill: Capillary refill takes 2 to 3 seconds.   Neurological:      Mental Status: He is alert and oriented to person, place, and time.             Result Review    Result Review:  I have personally reviewed the results from the time of this admission to 8/16/2021 11:42 EDT and agree with these findings:  [x]  Laboratory  [x]  Microbiology  [x]  Radiology  [x]  EKG/Telemetry   []  Cardiology/Vascular   []  Pathology  [x]  Old records  []  Other:    Most notable findings include:     08/16/21:  hgb 6.6, K 3.5, Cr 2.45, glucose 167        Assessment/Plan      Brief Patient Summary:  Benson Mclean is a 70 y.o. male who presented to [facility] ED on 08/03/2021 complaining of palpitations and chest pain. Workup in the ER revealed atrial fibrillation with rapid ventricular response. The patient was given Cardizem bolus and started on Cardizem drip. The patient was admitted to the Hospitalist team for further evaluation and treatment.     08/04/21:  Cardiology consulted.  Remains on Cardizem drip.  Nephrology consulted for acute kidney injury.  Diabetes educator consulted for A1c >7.0%.    08/05/21:  Heart rate controlled, off Cardizem drip.      08/06/21:  Awaiting precert for rehab.      08/07/21:  Awaiting precert for rehab.      08/08/21:  Awaiting precert for rehab.      08/09/21:  Awaiting precert for rehab.      08/10/21:  Renal function worsened and patient now with hyperkalemia.  Pulmonary consulted for acute respiratory failure secondary to pulmonary edema with bilateral pleural effusions.  Patient placed on noninvasive ventilation.    08/11/21:  Remains on BiPAP.      08/12/21:  O2 @ 8 L and patient c/o shortness of breath. Placed back on BiPAP.    08/13/21:  Remains on BiPAP.      08/14/21:  Patient moved to ICU overnight for worsening hypoxia; on AVAPS.  Shiley inserted and Hemodialysis initiated.      08/15/21:  Alternating Precision Flow 35 L / 100% and AVAPS.      08/16/21:  Patient  downgraded to PCU and Hospitalist team re-consulted for medical management.  The patient is complaining of some shortness of breath. He is currently on PF 25 L / 80%.      amiodarone, 200 mg, Oral, Q12H  [START ON 8/17/2021] apixaban, 5 mg, Oral, Q12H  aspirin, 81 mg, Oral, Daily  atorvastatin, 40 mg, Oral, Nightly  budesonide, 0.5 mg, Nebulization, BID - RT  bumetanide, 2 mg, Intravenous, Q12H  carvedilol, 6.25 mg, Oral, BID With Meals  cholecalciferol, 1,000 Units, Oral, Daily  dilTIAZem CD, 240 mg, Oral, Q24H  donepezil, 10 mg, Oral, Nightly  DULoxetine, 60 mg, Oral, Daily  epoetin izabella-epbx, 20,000 Units, Subcutaneous, Weekly  hydrALAZINE, 50 mg, Oral, Q12H  insulin lispro, 0-14 Units, Subcutaneous, TID AC  ipratropium-albuterol, 3 mL, Nebulization, Q4H - RT  isosorbide mononitrate, 60 mg, Oral, BID - Nitrates  metOLazone, 5 mg, Oral, Daily  sodium chloride, 10 mL, Intravenous, Q12H  vitamin B-12, 1,000 mcg, Oral, Daily             Active Hospital Problems:  Active Hospital Problems    Diagnosis    • **Acute on chronic systolic heart failure (CMS/HCC)    • Acute respiratory failure with hypoxia and hypercapnia (CMS/[facility])    • Anemia of renal disease    • Acute kidney injury (CMS/[facility])    • CKD (chronic kidney disease) stage 3, GFR 30-59 ml/min (CMS/[facility])    • Type 2 diabetes mellitus with hyperglycemia (CMS/HCC)    • Mixed hyperlipidemia    • Obesity    • History of cerebrovascular accident    • Chronic obstructive pulmonary disease, unspecified (CMS/HCC)    • Flaccid hemiplegia of right dominant side as late effect of cerebral infarction (CMS/HCC)    • Unspecified dementia with behavioral disturbance (CMS/[facility])    • Major depressive disorder, recurrent, mild (CMS/HCC)    • Paroxysmal atrial fibrillation (CMS/HCC)    • Elevated troponin    • Essential hypertension    • S/P CABG (coronary artery bypass graft)    • Chronic venous hypertension (idiopathic) with ulcer and inflammation of bilateral lower extremity  (CMS/HCC)    • Chronic foot ulcer (CMS/HCC)    • Coronary artery disease    • Vitamin D deficiency    • ANNETTE treated with BiPAP    • Diabetic neuropathy (CMS/HCC)      Plan:     Acute on chronic systolic heart failure   -dialysis/diuresis per nephrology  -cardiology following  -monitor I&Os and daily weight  -on diuretics, BB, CCB, and Imdur   -ACE-I/ARB contraindicated d/t DILEEP    Acute respiratory failure with hypoxia and hypercapnia   -secondary to fluid overload  -currently on PF 25 L / 80%  -titrate O2 to keep sat >90%  -AVAPS PRN  -pulmonary following    Acute kidney injury, CKD (chronic kidney disease) stage 3, GFR 30-59 ml/min   -nephrology following  -Shiley inserted and hemodialysis initiated on 08/14/21  -HD planned for today (08/16/21)  -monitor BMP    Elevated troponin  -likely demand ischemia  -cardiology following    Paroxysmal atrial fibrillation  -rate controlled  -continue amiodarone, diltiazem CD, and Eliquis    Coronary artery disease, S/P CABG / Essential hypertension, chronic / Mixed hyperlipidemia  -continue aspirin, Eliquis, statin, Bumex, carvedilol, diltiazem CD, hydralazine, Imdur, and metolazone  -monitor BP    ANNETTE treated with BiPAP  -currently using hospital BiPAP    Major depressive disorder, recurrent, mild  -continue Cymbalta     History of cerebrovascular accident  Flaccid hemiplegia of right dominant side as late effect of cerebral infarction   -fall precautions  -continue aspirin, Elqius, and statin     Type 2 diabetes mellitus with hyperglycemia complicated with Diabetic neuropathy   -A1c 7.4% on 07/21/21  -accu checks AC&HS with SSI    Unspecified dementia with behavioral disturbance  -continue donepezil     Obesity  -BMI 37.39   -encourage lifestyle modifications     Vitamin D deficiency  -continue cholecalciferol     Chronic venous hypertension (idiopathic) with ulcer and inflammation of bilateral lower extremity, Chronic foot ulcer    Chronic obstructive pulmonary disease,  unspecified, former smoker  -stable without exacerbation  -continue bronchodilators     Anemia of renal disease  -acute on chronic  -on weekly Retacrit   -hgb 6.6 today  -monitor H&H          DVT prophylaxis:  Medical DVT prophylaxis orders are present.    CODE STATUS:    Code Status: CPR  Medical Interventions (Level of Support Prior to Arrest): Full      Disposition:  Defer, pending fluid removal, possible outpatient hemodialysis    This patient has been examined wearing appropriate Personal Protective Equipment. 08/16/21      Electronically signed by FABIOLA Pizarro, 08/16/21, 11:42 EDT.  [facility] Hospitalist Team

## 2021-08-17 ENCOUNTER — APPOINTMENT (OUTPATIENT)
Dept: GENERAL RADIOLOGY | Facility: HOSPITAL | Age: 71
End: 2021-08-17

## 2021-08-17 LAB
ALBUMIN SERPL-MCNC: 3.1 G/DL (ref 3.5–5.2)
ANION GAP SERPL CALCULATED.3IONS-SCNC: 9 MMOL/L (ref 5–15)
BASOPHILS # BLD AUTO: 0 10*3/MM3 (ref 0–0.2)
BASOPHILS NFR BLD AUTO: 0.6 % (ref 0–1.5)
BH BB BLOOD EXPIRATION DATE: NORMAL
BH BB BLOOD TYPE BARCODE: 6200
BH BB DISPENSE STATUS: NORMAL
BH BB PRODUCT CODE: NORMAL
BH BB UNIT NUMBER: NORMAL
BUN SERPL-MCNC: 35 MG/DL (ref 8–23)
BUN/CREAT SERPL: 19.8 (ref 7–25)
CALCIUM SPEC-SCNC: 7.4 MG/DL (ref 8.6–10.5)
CHLORIDE SERPL-SCNC: 97 MMOL/L (ref 98–107)
CO2 SERPL-SCNC: 28 MMOL/L (ref 22–29)
CREAT SERPL-MCNC: 1.77 MG/DL (ref 0.76–1.27)
CROSSMATCH INTERPRETATION: NORMAL
DEPRECATED RDW RBC AUTO: 49.9 FL (ref 37–54)
EOSINOPHIL # BLD AUTO: 0.5 10*3/MM3 (ref 0–0.4)
EOSINOPHIL NFR BLD AUTO: 7.1 % (ref 0.3–6.2)
ERYTHROCYTE [DISTWIDTH] IN BLOOD BY AUTOMATED COUNT: 16.3 % (ref 12.3–15.4)
GFR SERPL CREATININE-BSD FRML MDRD: 38 ML/MIN/1.73
GLUCOSE BLDC GLUCOMTR-MCNC: 175 MG/DL (ref 70–105)
GLUCOSE BLDC GLUCOMTR-MCNC: 210 MG/DL (ref 70–105)
GLUCOSE BLDC GLUCOMTR-MCNC: 269 MG/DL (ref 70–105)
GLUCOSE BLDC GLUCOMTR-MCNC: 300 MG/DL (ref 70–105)
GLUCOSE SERPL-MCNC: 156 MG/DL (ref 65–99)
HCT VFR BLD AUTO: 23.6 % (ref 37.5–51)
HGB BLD-MCNC: 7.7 G/DL (ref 13–17.7)
LYMPHOCYTES # BLD AUTO: 1.7 10*3/MM3 (ref 0.7–3.1)
LYMPHOCYTES NFR BLD AUTO: 21.8 % (ref 19.6–45.3)
MCH RBC QN AUTO: 28.1 PG (ref 26.6–33)
MCHC RBC AUTO-ENTMCNC: 32.5 G/DL (ref 31.5–35.7)
MCV RBC AUTO: 86.3 FL (ref 79–97)
MONOCYTES # BLD AUTO: 0.8 10*3/MM3 (ref 0.1–0.9)
MONOCYTES NFR BLD AUTO: 10.7 % (ref 5–12)
NEUTROPHILS NFR BLD AUTO: 4.6 10*3/MM3 (ref 1.7–7)
NEUTROPHILS NFR BLD AUTO: 59.8 % (ref 42.7–76)
NRBC BLD AUTO-RTO: 0 /100 WBC (ref 0–0.2)
PHOSPHATE SERPL-MCNC: 2 MG/DL (ref 2.5–4.5)
PLATELET # BLD AUTO: 314 10*3/MM3 (ref 140–450)
PMV BLD AUTO: 7.2 FL (ref 6–12)
POTASSIUM SERPL-SCNC: 3.2 MMOL/L (ref 3.5–5.2)
RBC # BLD AUTO: 2.73 10*6/MM3 (ref 4.14–5.8)
SODIUM SERPL-SCNC: 134 MMOL/L (ref 136–145)
UNIT  ABO: NORMAL
UNIT  RH: NORMAL
WBC # BLD AUTO: 7.6 10*3/MM3 (ref 3.4–10.8)

## 2021-08-17 PROCEDURE — 63710000001 INSULIN LISPRO (HUMAN) PER 5 UNITS: Performed by: INTERNAL MEDICINE

## 2021-08-17 PROCEDURE — 94799 UNLISTED PULMONARY SVC/PX: CPT

## 2021-08-17 PROCEDURE — 82962 GLUCOSE BLOOD TEST: CPT

## 2021-08-17 PROCEDURE — 71045 X-RAY EXAM CHEST 1 VIEW: CPT

## 2021-08-17 PROCEDURE — 80069 RENAL FUNCTION PANEL: CPT | Performed by: INTERNAL MEDICINE

## 2021-08-17 PROCEDURE — 94660 CPAP INITIATION&MGMT: CPT

## 2021-08-17 PROCEDURE — 97535 SELF CARE MNGMENT TRAINING: CPT

## 2021-08-17 PROCEDURE — 97530 THERAPEUTIC ACTIVITIES: CPT

## 2021-08-17 PROCEDURE — 85025 COMPLETE CBC W/AUTO DIFF WBC: CPT | Performed by: INTERNAL MEDICINE

## 2021-08-17 PROCEDURE — 99232 SBSQ HOSP IP/OBS MODERATE 35: CPT | Performed by: HOSPITALIST

## 2021-08-17 PROCEDURE — 99232 SBSQ HOSP IP/OBS MODERATE 35: CPT | Performed by: INTERNAL MEDICINE

## 2021-08-17 RX ADMIN — METOLAZONE 5 MG: 5 TABLET ORAL at 09:23

## 2021-08-17 RX ADMIN — Medication 1000 UNITS: at 08:02

## 2021-08-17 RX ADMIN — APIXABAN 5 MG: 5 TABLET, FILM COATED ORAL at 08:02

## 2021-08-17 RX ADMIN — INSULIN LISPRO 2 UNITS: 100 INJECTION, SOLUTION INTRAVENOUS; SUBCUTANEOUS at 08:16

## 2021-08-17 RX ADMIN — DONEPEZIL HYDROCHLORIDE 10 MG: 5 TABLET, FILM COATED ORAL at 21:15

## 2021-08-17 RX ADMIN — ASPIRIN 81 MG: 81 TABLET, CHEWABLE ORAL at 08:02

## 2021-08-17 RX ADMIN — BUMETANIDE 2 MG: 0.25 INJECTION INTRAMUSCULAR; INTRAVENOUS at 01:23

## 2021-08-17 RX ADMIN — ATORVASTATIN CALCIUM 40 MG: 40 TABLET, FILM COATED ORAL at 01:23

## 2021-08-17 RX ADMIN — Medication 10 ML: at 01:24

## 2021-08-17 RX ADMIN — BUDESONIDE 0.5 MG: 0.5 SUSPENSION RESPIRATORY (INHALATION) at 07:14

## 2021-08-17 RX ADMIN — CARVEDILOL 6.25 MG: 6.25 TABLET, FILM COATED ORAL at 17:15

## 2021-08-17 RX ADMIN — Medication 10 ML: at 09:54

## 2021-08-17 RX ADMIN — IPRATROPIUM BROMIDE AND ALBUTEROL SULFATE 3 ML: 2.5; .5 SOLUTION RESPIRATORY (INHALATION) at 11:24

## 2021-08-17 RX ADMIN — INSULIN LISPRO 10 UNITS: 100 INJECTION, SOLUTION INTRAVENOUS; SUBCUTANEOUS at 12:29

## 2021-08-17 RX ADMIN — APIXABAN 5 MG: 5 TABLET, FILM COATED ORAL at 21:15

## 2021-08-17 RX ADMIN — DULOXETINE 60 MG: 30 CAPSULE, DELAYED RELEASE ORAL at 08:02

## 2021-08-17 RX ADMIN — IPRATROPIUM BROMIDE AND ALBUTEROL SULFATE 3 ML: 2.5; .5 SOLUTION RESPIRATORY (INHALATION) at 15:26

## 2021-08-17 RX ADMIN — IPRATROPIUM BROMIDE AND ALBUTEROL SULFATE 3 ML: 2.5; .5 SOLUTION RESPIRATORY (INHALATION) at 03:10

## 2021-08-17 RX ADMIN — INSULIN LISPRO 5 UNITS: 100 INJECTION, SOLUTION INTRAVENOUS; SUBCUTANEOUS at 17:15

## 2021-08-17 RX ADMIN — ATORVASTATIN CALCIUM 40 MG: 40 TABLET, FILM COATED ORAL at 21:15

## 2021-08-17 RX ADMIN — AMIODARONE HYDROCHLORIDE 200 MG: 200 TABLET ORAL at 08:02

## 2021-08-17 RX ADMIN — DONEPEZIL HYDROCHLORIDE 10 MG: 5 TABLET, FILM COATED ORAL at 01:23

## 2021-08-17 RX ADMIN — CYANOCOBALAMIN TAB 1000 MCG 1000 MCG: 1000 TAB at 08:02

## 2021-08-17 RX ADMIN — Medication 10 ML: at 21:15

## 2021-08-17 RX ADMIN — IPRATROPIUM BROMIDE AND ALBUTEROL SULFATE 3 ML: 2.5; .5 SOLUTION RESPIRATORY (INHALATION) at 07:14

## 2021-08-17 RX ADMIN — ISOSORBIDE MONONITRATE 60 MG: 60 TABLET, EXTENDED RELEASE ORAL at 09:22

## 2021-08-17 RX ADMIN — POTASSIUM PHOSPHATE, MONOBASIC AND POTASSIUM PHOSPHATE, DIBASIC 40 MMOL: 224; 236 INJECTION, SOLUTION, CONCENTRATE INTRAVENOUS at 17:15

## 2021-08-17 RX ADMIN — AMIODARONE HYDROCHLORIDE 200 MG: 200 TABLET ORAL at 01:23

## 2021-08-17 RX ADMIN — BUMETANIDE 2 MG: 0.25 INJECTION INTRAMUSCULAR; INTRAVENOUS at 12:29

## 2021-08-17 RX ADMIN — CARVEDILOL 6.25 MG: 6.25 TABLET, FILM COATED ORAL at 09:23

## 2021-08-17 RX ADMIN — BUDESONIDE 0.5 MG: 0.5 SUSPENSION RESPIRATORY (INHALATION) at 19:43

## 2021-08-17 RX ADMIN — IPRATROPIUM BROMIDE AND ALBUTEROL SULFATE 3 ML: 2.5; .5 SOLUTION RESPIRATORY (INHALATION) at 23:06

## 2021-08-17 RX ADMIN — AMIODARONE HYDROCHLORIDE 200 MG: 200 TABLET ORAL at 21:15

## 2021-08-17 RX ADMIN — IPRATROPIUM BROMIDE AND ALBUTEROL SULFATE 3 ML: 2.5; .5 SOLUTION RESPIRATORY (INHALATION) at 19:43

## 2021-08-17 NOTE — PLAN OF CARE
Goal Outcome Evaluation:  Plan of Care Reviewed With: patient        Progress: no change     Patient continues on precision flow oxygen and is hypotensive at times, continues to be in afib, no pain noted, states SOB is improving, will continue to monitor.

## 2021-08-17 NOTE — PROGRESS NOTES
[facility] Medicine Services Daily Progress Note    Patient Name: Benson Mclean  : 1950  MRN: [id number]  Primary Care Physician:  Samantha Zabala APRN  Date of admission: 8/3/2021      Subjective      Chief Complaint: Shortness of breath.      Patient Reports     21: s/p HD yesterday.  Feels better.  On 65% FiO2.  Informed the patient that he is getting decreased insulin dose to minimize hypoglycemia.    Review of Systems   All other systems reviewed and are negative.         Objective      Vitals:   Temp:  [97.3 °F (36.3 °C)-97.8 °F (36.6 °C)] 97.5 °F (36.4 °C)  Heart Rate:  [] 93  Resp:  [12-22] 17  BP: ()/(45-59) 106/55  Flow (L/min):  [25-30] 25    Physical Exam  Constitutional:       General: He is not in acute distress.  HENT:      Head: Normocephalic and atraumatic.      Nose: Nose normal.   Eyes:      General: No scleral icterus.     Extraocular Movements: Extraocular movements intact.      Pupils: Pupils are equal, round, and reactive to light.   Cardiovascular:      Rate and Rhythm: Normal rate and regular rhythm.   Pulmonary:      Effort: Pulmonary effort is normal.      Breath sounds: Examination of the right-lower field reveals rales. Rales present.   Abdominal:      General: Bowel sounds are normal.      Comments: Obese abdomen.   Musculoskeletal:      Cervical back: Normal range of motion.      Comments: Decreased range of motion hips   Lymphadenopathy:      Cervical: No cervical adenopathy.   Skin:     General: Skin is warm.      Findings: No rash.   Neurological:      Mental Status: He is alert.      Cranial Nerves: Cranial nerves are intact.   Psychiatric:         Mood and Affect: Mood normal.           Result Review    Result Review:  I have personally reviewed the results from the time of this admission to 2021 10:30 EDT and agree with these findings:  [x]  Laboratory  []  Microbiology  []  Radiology  []  EKG/Telemetry   [x]   Cardiology/Vascular   []  Pathology  [x]  Old records  []  Other:            Assessment/Plan      Brief Patient Summary:    70 years old male admitted for A. fib RVR and eventual transfer to ICU for worsening hypoxemia and eventual initiation on hemodialysis for worsening DILEEP.       amiodarone, 200 mg, Oral, Q12H  apixaban, 5 mg, Oral, Q12H  aspirin, 81 mg, Oral, Daily  atorvastatin, 40 mg, Oral, Nightly  budesonide, 0.5 mg, Nebulization, BID - RT  bumetanide, 2 mg, Intravenous, Q12H  carvedilol, 6.25 mg, Oral, BID With Meals  cholecalciferol, 1,000 Units, Oral, Daily  dilTIAZem CD, 240 mg, Oral, Q24H  donepezil, 10 mg, Oral, Nightly  DULoxetine, 60 mg, Oral, Daily  epoetin izabella-epbx, 20,000 Units, Subcutaneous, Weekly  hydrALAZINE, 50 mg, Oral, Q12H  insulin lispro, 0-14 Units, Subcutaneous, TID AC  ipratropium-albuterol, 3 mL, Nebulization, Q4H - RT  isosorbide mononitrate, 60 mg, Oral, BID - Nitrates  metOLazone, 5 mg, Oral, Daily  sodium chloride, 10 mL, Intravenous, Q12H  vitamin B-12, 1,000 mcg, Oral, Daily             Active Hospital Problems:  Active Hospital Problems    Diagnosis    • **Acute on chronic systolic heart failure (CMS/HCC)    • Essential hypertension    • S/P CABG (coronary artery bypass graft)    • Acute respiratory failure with hypoxia and hypercapnia (CMS/[facility])    • Anemia of renal disease    • Acute kidney injury (CMS/[facility])    • CKD (chronic kidney disease) stage 3, GFR 30-59 ml/min (CMS/[facility])    • Type 2 diabetes mellitus with hyperglycemia (CMS/[facility])    • Mixed hyperlipidemia    • Obesity    • History of cerebrovascular accident    • Chronic obstructive pulmonary disease, unspecified (CMS/HCC)    • Flaccid hemiplegia of right dominant side as late effect of cerebral infarction (CMS/[facility])    • Unspecified dementia with behavioral disturbance (CMS/[facility])    • Major depressive disorder, recurrent, mild (CMS/HCC)    • Paroxysmal atrial fibrillation (CMS/[facility])    • Elevated troponin    • Chronic  venous hypertension (idiopathic) with ulcer and inflammation of bilateral lower extremity (CMS/HCC)    • Chronic foot ulcer (CMS/HCC)    • Coronary artery disease    • Vitamin D deficiency    • ANNETTE treated with BiPAP    • Diabetic neuropathy (CMS/HCC)           Acute on chronic systolic heart failure   -Improved with hemodialysis and diuretics  -on diuretics, BB, CCB, and Imdur   -ACE-I/ARB contraindicated d/t DILEEP     Acute respiratory failure with hypoxia and hypercapnia: Improving  -secondary to fluid overload and reason for transfer to ICU on 8/14/2020  -Continue precision flow  -titrate O2 to keep sat >90%  -AVAPS PRN  -pulmonary following     Acute kidney injury, CKD (chronic kidney disease) stage 3, (POA)  -nephrology following  -Right IJ Shiley inserted and hemodialysis initiated on 08/14/21  -s/p HD 8/16        Elevated troponin:  -Denies chest pain  -likely demand ischemia  -cardiology following     Paroxysmal atrial fibrillation with RVR (POA)  -rate controlled  -continue amiodarone, diltiazem CD, and Eliquis  -Cardiology following     Coronary artery disease, S/P CABG / Essential hypertension, chronic / Mixed hyperlipidemia  -continue aspirin, Eliquis, statin, Bumex, carvedilol, diltiazem CD, hydralazine, Imdur, and metolazone  -monitor BP     ANNETTE:  -Claims to be compliant with CPAP at home  -currently using hospital BiPAP     Major depressive disorder:  -continue Cymbalta      History of cerebrovascular accident  -Flaccid hemiplegia of right dominant side as late effect of cerebral infarction   -fall precautions  -continue aspirin, Elqius, and statin      Type 2 diabetes mellitus with hyperglycemia complicated with Diabetic neuropathy   -A1c 7.4% on 07/21/21  -Continue ISS     dementia with behavioral disturbance  -continue donepezil      Obesity  -BMI 37.39   -encourage lifestyle modifications      Vitamin D deficiency  -continue cholecalciferol      Chronic obstructive pulmonary disease, unspecified,  former smoker  -stable without exacerbation  -continue bronchodilators      Anemia of chronic disease   -on weekly Retacrit   -Monitor H&H    DVT prophylaxis:  Medical DVT prophylaxis orders are present.    CODE STATUS:    Code Status: CPR  Medical Interventions (Level of Support Prior to Arrest): Full      Disposition:  I expect patient to be discharged rehab.    This patient has been examined wearing appropriate Personal Protective Equipment and discussed with nursing. 08/17/21      Electronically signed by Maurilio Boucher DO, 08/17/21, 10:30 EDT.  [facility] Hospitalist Team

## 2021-08-17 NOTE — PLAN OF CARE
Goal Outcome Evaluation:         Benson Mclean presents with ADL impairments below baseline abilities which indicate the need for continued skilled intervention while inpatient. Tolerating session today without incident. Tolerates sitting up and standing with more endurance & able to participate better in EOB ADl tasks & no c/o SOA though on increased O2 on Precision flow now. 25LPM, 60% flow. Will continue to follow and progress as tolerated. Will require extensive rehab at d/c. Pt always eager for therapy.

## 2021-08-17 NOTE — PLAN OF CARE
Patient had dialysis at the beginning of shift.  They removed 4 L, patient tolerated well.  No complaints per patient.  He is at high risk for skin injury and remains on the turn and reposition team.  He is a high falls, bed alarm set.    Problem: Arrhythmia/Dysrhythmia  Goal: Normalized Cardiac Rhythm  Intervention: Monitor and Manage Cardiac Rhythm Effects  Recent Flowsheet Documentation  Taken 8/16/2021 2000 by Meme Chavez RN  VTE Prevention/Management: patient refused intervention     Problem: Fall Injury Risk  Goal: Absence of Fall and Fall-Related Injury  Intervention: Promote Injury-Free Environment  Recent Flowsheet Documentation  Taken 8/17/2021 0400 by Meme Chavez RN  Safety Promotion/Fall Prevention:   safety round/check completed   room organization consistent   nonskid shoes/slippers when out of bed   activity supervised   assistive device/personal items within reach   clutter free environment maintained   elopement precautions   fall prevention program maintained  Taken 8/17/2021 0000 by Meme Chavez RN  Safety Promotion/Fall Prevention:   safety round/check completed   room organization consistent   nonskid shoes/slippers when out of bed   activity supervised   assistive device/personal items within reach   clutter free environment maintained   elopement precautions   fall prevention program maintained  Taken 8/16/2021 2000 by Meme Chavez RN  Safety Promotion/Fall Prevention:   safety round/check completed   room organization consistent   nonskid shoes/slippers when out of bed   activity supervised   assistive device/personal items within reach   clutter free environment maintained   elopement precautions   fall prevention program maintained     Problem: Adult Inpatient Plan of Care  Goal: Absence of Hospital-Acquired Illness or Injury  Intervention: Identify and Manage Fall Risk  Recent Flowsheet Documentation  Taken 8/17/2021 0400 by Meme Chavez RN  Safety Promotion/Fall  Prevention:   safety round/check completed   room organization consistent   nonskid shoes/slippers when out of bed   activity supervised   assistive device/personal items within reach   clutter free environment maintained   elopement precautions   fall prevention program maintained  Taken 8/17/2021 0000 by Meme Chavez RN  Safety Promotion/Fall Prevention:   safety round/check completed   room organization consistent   nonskid shoes/slippers when out of bed   activity supervised   assistive device/personal items within reach   clutter free environment maintained   elopement precautions   fall prevention program maintained  Taken 8/16/2021 2000 by Meme Chavez RN  Safety Promotion/Fall Prevention:   safety round/check completed   room organization consistent   nonskid shoes/slippers when out of bed   activity supervised   assistive device/personal items within reach   clutter free environment maintained   elopement precautions   fall prevention program maintained  Intervention: Prevent Skin Injury  Recent Flowsheet Documentation  Taken 8/17/2021 0400 by Meme Chavez RN  Skin Protection:   adhesive use limited   incontinence pads utilized  Taken 8/17/2021 0000 by Meme Chavez RN  Skin Protection:   adhesive use limited   incontinence pads utilized  Taken 8/16/2021 2000 by Meme Chavez RN  Skin Protection:   adhesive use limited   incontinence pads utilized  Intervention: Prevent and Manage VTE (venous thromboembolism) Risk  Recent Flowsheet Documentation  Taken 8/16/2021 2000 by Meme Chavez RN  VTE Prevention/Management: patient refused intervention  Goal: Optimal Comfort and Wellbeing  Intervention: Provide Person-Centered Care  Recent Flowsheet Documentation  Taken 8/17/2021 0400 by Meme Chavez RN  Trust Relationship/Rapport:   care explained   questions answered   questions encouraged   thoughts/feelings acknowledged  Taken 8/17/2021 0000 by Meme Chavez RN  Trust  Relationship/Rapport:   care explained   questions answered   questions encouraged   thoughts/feelings acknowledged  Taken 8/16/2021 2000 by Meme Chavez RN  Trust Relationship/Rapport:   care explained   questions answered   questions encouraged   thoughts/feelings acknowledged     Problem: Skin Injury Risk Increased  Goal: Skin Health and Integrity  Intervention: Optimize Skin Protection  Recent Flowsheet Documentation  Taken 8/17/2021 0400 by Meme Chavez RN  Pressure Reduction Techniques:   frequent weight shift encouraged   weight shift assistance provided  Head of Bed (HOB): HOB elevated  Pressure Reduction Devices: pressure-redistributing mattress utilized  Skin Protection:   adhesive use limited   incontinence pads utilized  Taken 8/17/2021 0000 by Meme Chavez RN  Pressure Reduction Techniques:   frequent weight shift encouraged   weight shift assistance provided  Head of Bed (HOB): HOB elevated  Pressure Reduction Devices: pressure-redistributing mattress utilized  Skin Protection:   adhesive use limited   incontinence pads utilized  Taken 8/16/2021 2000 by Meme Chavez RN  Pressure Reduction Techniques:   frequent weight shift encouraged   weight shift assistance provided  Head of Bed (HOB): HOB elevated  Pressure Reduction Devices: pressure-redistributing mattress utilized  Skin Protection:   adhesive use limited   incontinence pads utilized     Problem: Breathing Pattern Ineffective  Goal: Effective Breathing Pattern  Intervention: Promote Improved Breathing Pattern  Recent Flowsheet Documentation  Taken 8/17/2021 0400 by Meme Chavez RN  Head of Bed (HOB): HOB elevated  Taken 8/17/2021 0000 by Meme Chavez RN  Supportive Measures: active listening utilized  Head of Bed (HOB): HOB elevated  Taken 8/16/2021 2000 by Meme Chavez RN  Supportive Measures: active listening utilized  Head of Bed (HOB): HOB elevated     Problem: Fluid Volume Excess  Goal: Fluid  Balance  Intervention: Monitor and Manage Hypervolemia  Recent Flowsheet Documentation  Taken 8/17/2021 0400 by Meme Chavez, RN  Skin Protection:   adhesive use limited   incontinence pads utilized  Taken 8/17/2021 0000 by Meme Chavez RN  Skin Protection:   adhesive use limited   incontinence pads utilized  Taken 8/16/2021 2000 by Meme Chavez, RN  Skin Protection:   adhesive use limited   incontinence pads utilized   Goal Outcome Evaluation:

## 2021-08-17 NOTE — CASE MANAGEMENT/SOCIAL WORK
Continued Stay Note  DOREEN Trentyd     Patient Name: Benson Mclean  MRN: [id number]  Today's Date: 8/17/2021    Admit Date: 8/3/2021    Discharge Plan     Row Name 08/17/21 1153       Plan    Plan  D/C Plan: Return to [facility]. Will require precert. No PASRR required. Watch for new HD needs at d/c.    Plan Comments  Barrier to D/C: 25L HF O2, HD yesterday-monitoring labs to determine long-term dialysis needs, IV bumex.          Expected Discharge Date and Time     Expected Discharge Date Expected Discharge Time    Aug 20, 2021             Christine Dickerson

## 2021-08-17 NOTE — PROGRESS NOTES
Referring Provider: Maurilio Boucher,*    Reason for follow-up:  Atrial fibrillation  Cardiomyopathy  Shortness of breath  Bradycardia  Respiratory insufficiency-patient was transferred to ICU for BiPAP.    Patient Care Team:  Samantha Zabala APRN as PCP - General  Samantha Zablaa APRN as PCP - Family Medicine  Jose G Rm MD as Consulting Physician (Cardiology)    Subjective .  Feeling much better after dialysis was started.    ROS  Since I have last seen, the patient has been without any chest discomfort ,shortness of breath, palpitations, dizziness or syncope.  Denies having any headache ,abdominal pain ,nausea, vomiting , diarrhea constipation, loss of weight or loss of appetite.  Denies having any excessive bruising ,hematuria or blood in the stool.    Review of all systems negative except as indicated.    Reviewed ROS.  History  Past Medical History:   Diagnosis Date   • A-fib (CMS/[facility]) 8/3/2021   • Appetite loss    • Arthritis    • Brain bleed (CMS/[facility])    • Carpal tunnel syndrome on left    • CHF (congestive heart failure) (CMS/[facility])    • Chronic left-sided low back pain without sciatica 12/27/2017   • CKD (chronic kidney disease) stage 3, GFR 30-59 ml/min (CMS/[facility])    • Closed fracture of seventh thoracic vertebra (CMS/[facility]) 8/23/2020   • Cognitive communication deficit 12/1/2020   • COVID-19 2/19/2021   • Cytokine release syndrome, grade 1 5/24/2021   • Depression    • Diabetic neuropathy (CMS/[facility])    • DM2 (diabetes mellitus, type 2) (CMS/[facility])    • Hyperlipidemia    • Hypogonadism in male    • Nonsustained ventricular tachycardia (CMS/[facility]) 7/23/2021   • Obesity    • Paroxysmal atrial fibrillation (CMS/[facility]) 12/1/2020   • Sleep apnea    • Stroke (CMS/[facility])    • Unsteady gait        Past Surgical History:   Procedure Laterality Date   • ANGIOPLASTY      X2   • APPENDECTOMY     • CARDIAC CATHETERIZATION  11/13/2015   • CARDIAC CATHETERIZATION N/A 5/24/2021    Procedure: Left Heart Cath and coronary  angiogram;  Surgeon: Jose G Rm MD;  Location:  [facility];  Service: Cardiovascular;  Laterality: N/A;   • CARDIAC CATHETERIZATION  5/24/2021    Procedure: Saphenous Vein Graft;  Surgeon: Jose G Rm MD;  Location:  [facility];  Service: Cardiovascular;;   • CARDIAC CATHETERIZATION N/A 5/24/2021    Procedure: Percutaneous Coronary Intervention;  Surgeon: Bernabe Zambrano MD;  Location:  [facility];  Service: Cardiology;  Laterality: N/A;   • CARDIAC CATHETERIZATION N/A 5/24/2021    Procedure: Stent NIELS coronary;  Surgeon: Bernabe Zambrano MD;  Location:  [facility];  Service: Cardiology;  Laterality: N/A;   • CARDIAC CATHETERIZATION N/A 5/26/2021    Procedure: Percutaneous Coronary Intervention;  Surgeon: Bernabe Zambrano MD;  Location:  [facility];  Service: Cardiovascular;  Laterality: N/A;   • CARDIAC CATHETERIZATION N/A 5/26/2021    Procedure: Stent NIELS bypass graft;  Surgeon: Bernabe Zambrano MD;  Location:  [facility];  Service: Cardiovascular;  Laterality: N/A;   • CARDIAC ELECTROPHYSIOLOGY PROCEDURE N/A 5/24/2021    Procedure: Temporary Pacemaker;  Surgeon: Bernabe Zambrano MD;  Location:  [facility];  Service: Cardiology;  Laterality: N/A;   • CARDIAC ELECTROPHYSIOLOGY PROCEDURE N/A 5/26/2021    Procedure: Temporary Pacemaker;  Surgeon: Bernabe Zambrano MD;  Location:  [facility];  Service: Cardiovascular;  Laterality: N/A;   • CARPAL TUNNEL RELEASE Left 04/29/2018    carpal tunnel- lt hand// other hand surgeries    • CATARACT EXTRACTION, BILATERAL  2002    Dr. Lux Acosta   • CHOLECYSTECTOMY     • COLON RESECTION  2005   • CORONARY ANGIOPLASTY     • CORONARY ANGIOPLASTY WITH STENT PLACEMENT  11/13/2015    PTCA stent to proximal in stent and mid to distal lad   • CORONARY ANGIOPLASTY WITH STENT PLACEMENT  09/16/2016    PTCA stent to mid lad and stent to vein graft  to marginal   • CORONARY ARTERY BYPASS GRAFT  2005    @ [facility]   • CYST REMOVAL      cyst removed from scrotum   • FOOT SURGERY Right 07/17/2018   • FOOT SURGERY Left 02/04/2019   • TOE AMPUTATION Right     right toe removed D/T infected cut that went to the bone       Family History   Problem Relation Age of Onset   • Heart disease Mother    • Heart disease Father    • Diabetes Sister    • Heart disease Sister    • Diabetes Brother    • Mental illness Brother        Social History     Tobacco Use   • Smoking status: Former Smoker     Packs/day: 1.00     Years: 60.00     Pack years: 60.00     Types: Cigarettes   • Smokeless tobacco: Never Used   • Tobacco comment: Patient quit smoking 11/2020   Vaping Use   • Vaping Use: Never used   Substance Use Topics   • Alcohol use: No   • Drug use: Yes     Frequency: 1.0 times per week     Types: Marijuana     Comment: socially        Medications Prior to Admission   Medication Sig Dispense Refill Last Dose   • albuterol sulfate  (90 Base) MCG/ACT inhaler Inhale 2 puffs Every 4 (Four) Hours As Needed.      • apixaban (ELIQUIS) 5 MG tablet tablet Take 5 mg by mouth Daily.      • aspirin 81 MG chewable tablet Chew 81 mg Daily.      • atorvastatin (LIPITOR) 40 MG tablet Take 40 mg by mouth Every Night.      • cholecalciferol (VITAMIN D3) 25 MCG (1000 UT) tablet Take 1,000 Units by mouth Daily.      • dextrose (GLUTOSE) 40 % gel Take  by mouth Every 1 (One) Hour As Needed for Low Blood Sugar.      • donepezil (ARICEPT) 10 MG tablet Take 10 mg by mouth Every Night.      • DULoxetine HCl (DRIZALMA) 60 MG capsule Take 60 mg by mouth Daily.      • gabapentin (NEURONTIN) 100 MG capsule Take 2 capsules by mouth 2 (two) times a day. 6 capsule 0    • hydrALAZINE (APRESOLINE) 50 MG tablet Take 50 mg by mouth 2 (two) times a day. Hold for SBP <110      • isosorbide mononitrate (IMDUR) 60 MG 24 hr tablet Take 60 mg by mouth 2 (Two) Times a Day.      • metoprolol succinate XL (TOPROL-XL)  100 MG 24 hr tablet Take 1 tablet by mouth Daily for 30 days. 30 tablet 0    • nitroglycerin (NITROSTAT) 0.4 MG SL tablet Place 1 tablet under the tongue Every 5 (Five) Minutes As Needed for Chest Pain (Only if SBP Greater Than 100). Take no more than 3 doses in 15 minutes. 30 tablet 12    • spironolactone (ALDACTONE) 25 MG tablet Take 1 tablet by mouth Daily for 30 days. 30 tablet 0    • tiotropium (Spiriva HandiHaler) 18 MCG per inhalation capsule Place 1 capsule into inhaler and inhale Daily. 30 capsule 1    • vitamin B-12 (CYANOCOBALAMIN) 1000 MCG tablet Take 1,000 mcg by mouth Daily.      • [DISCONTINUED] furosemide (LASIX) 40 MG tablet Take 1 tablet by mouth 2 (Two) Times a Day for 30 days. 60 tablet 0        Allergies  Codeine    Scheduled Meds:amiodarone, 200 mg, Oral, Q12H  apixaban, 5 mg, Oral, Q12H  aspirin, 81 mg, Oral, Daily  atorvastatin, 40 mg, Oral, Nightly  budesonide, 0.5 mg, Nebulization, BID - RT  bumetanide, 2 mg, Intravenous, Q12H  carvedilol, 6.25 mg, Oral, BID With Meals  cholecalciferol, 1,000 Units, Oral, Daily  dilTIAZem CD, 240 mg, Oral, Q24H  donepezil, 10 mg, Oral, Nightly  DULoxetine, 60 mg, Oral, Daily  epoetin izabella-epbx, 20,000 Units, Subcutaneous, Weekly  hydrALAZINE, 50 mg, Oral, Q12H  insulin lispro, 0-14 Units, Subcutaneous, TID AC  ipratropium-albuterol, 3 mL, Nebulization, Q4H - RT  isosorbide mononitrate, 60 mg, Oral, BID - Nitrates  metOLazone, 5 mg, Oral, Daily  sodium chloride, 10 mL, Intravenous, Q12H  vitamin B-12, 1,000 mcg, Oral, Daily      Continuous Infusions:   PRN Meds:.•  acetaminophen **OR** acetaminophen **OR** acetaminophen  •  aluminum-magnesium hydroxide-simethicone  •  dextrose  •  dextrose  •  glucagon (human recombinant)  •  heparin (porcine)  •  insulin lispro **AND** insulin lispro  •  melatonin  •  nitroglycerin  •  ondansetron **OR** ondansetron  •  [COMPLETED] Insert peripheral IV **AND** sodium chloride  •  sodium chloride    Objective     VITAL  "SIGNS  Vitals:    08/17/21 0310 08/17/21 0314 08/17/21 0500 08/17/21 0632   BP:   137/59 (!) 83/51   BP Location:    Right arm   Patient Position:    Lying   Pulse: 89  91 105   Resp: 18 18  18   Temp:    97.8 °F (36.6 °C)   TempSrc:    Axillary   SpO2: 93%  94% 90%   Weight:       Height:           Flowsheet Rows      First Filed Value   Admission Height  180.3 cm (71\") Documented at 08/03/2021 1733   Admission Weight  113 kg (250 lb) Documented at 08/03/2021 1733            Intake/Output Summary (Last 24 hours) at 8/17/2021 0648  Last data filed at 8/16/2021 2350  Gross per 24 hour   Intake 470 ml   Output 4600 ml   Net -4130 ml        TELEMETRY: Atrial fibrillation with controlled ventricular response.    Physical Exam:  The patient is awake and alert and not in distress.  Vital signs as noted above.  Exogenous obesity.  Head and neck revealed no carotid bruits or jugular venous distention.  No thyromegaly or lymphadenopathy is present  Lungs clear.  No wheezing.  Breath sounds are normal bilaterally.  Heart normal first and second heart sounds.  No murmur. No precordial rub is present.  No gallop is present.  Abdomen soft and nontender.  No organomegaly is present.  Extremities with good peripheral pulses without any pedal edema.  Skin warm and dry.  Musculoskeletal system is grossly normal  CNS-grossly normal.  Results Review:   I reviewed the patient's new clinical results.  Lab Results (last 24 hours)     Procedure Component Value Units Date/Time    Renal Function Panel [[id number]]  (Abnormal) Collected: 08/17/21 0538    Specimen: Blood Updated: 08/17/21 0623     Glucose 156 mg/dL      BUN 35 mg/dL      Creatinine 1.77 mg/dL      Sodium 134 mmol/L      Potassium 3.2 mmol/L      Chloride 97 mmol/L      CO2 28.0 mmol/L      Calcium 7.4 mg/dL      Albumin 3.10 g/dL      Phosphorus 2.0 mg/dL      Anion Gap 9.0 mmol/L      BUN/Creatinine Ratio 19.8     eGFR Non African Amer 38 mL/min/1.73     Narrative:      GFR " Normal >60  Chronic Kidney Disease <60  Kidney Failure <15      CBC & Differential [[id number]]  (Abnormal) Collected: 08/17/21 0538    Specimen: Blood Updated: 08/17/21 0548    Narrative:      The following orders were created for panel order CBC & Differential.  Procedure                               Abnormality         Status                     ---------                               -----------         ------                     CBC Auto Differential[[id number]]        Abnormal            Final result                 Please view results for these tests on the individual orders.    CBC Auto Differential [[id number]]  (Abnormal) Collected: 08/17/21 0538    Specimen: Blood Updated: 08/17/21 0548     WBC 7.60 10*3/mm3      RBC 2.73 10*6/mm3      Hemoglobin 7.7 g/dL      Hematocrit 23.6 %      MCV 86.3 fL      MCH 28.1 pg      MCHC 32.5 g/dL      RDW 16.3 %      RDW-SD 49.9 fl      MPV 7.2 fL      Platelets 314 10*3/mm3      Neutrophil % 59.8 %      Lymphocyte % 21.8 %      Monocyte % 10.7 %      Eosinophil % 7.1 %      Basophil % 0.6 %      Neutrophils, Absolute 4.60 10*3/mm3      Lymphocytes, Absolute 1.70 10*3/mm3      Monocytes, Absolute 0.80 10*3/mm3      Eosinophils, Absolute 0.50 10*3/mm3      Basophils, Absolute 0.00 10*3/mm3      nRBC 0.0 /100 WBC     POC Glucose Once [[id number]]  (Abnormal) Collected: 08/16/21 2037    Specimen: Blood Updated: 08/16/21 2038     Glucose 219 mg/dL      Comment: Serial Number: [id number]:  [id number]       POC Glucose Once [[id number]]  (Abnormal) Collected: 08/16/21 1624    Specimen: Blood Updated: 08/16/21 1625     Glucose 158 mg/dL      Comment: Serial Number: [id number]:  [id number]       POC Glucose Once [[id number]]  (Abnormal) Collected: 08/16/21 1203    Specimen: Blood Updated: 08/16/21 1510     Glucose 220 mg/dL      Comment: Serial Number: [id number]:  [id number]       CBC & Differential [[id number]]  (Abnormal) Collected: 08/16/21 1120    Specimen:  Blood Updated: 08/16/21 1323    Narrative:      The following orders were created for panel order CBC & Differential.  Procedure                               Abnormality         Status                     ---------                               -----------         ------                     CBC Auto Differential[[id number]]        Abnormal            Final result                 Please view results for these tests on the individual orders.    CBC Auto Differential [[id number]]  (Abnormal) Collected: 08/16/21 1120    Specimen: Blood Updated: 08/16/21 1323     WBC 7.20 10*3/mm3      RBC 2.66 10*6/mm3      Hemoglobin 7.6 g/dL      Hematocrit 22.9 %      MCV 86.2 fL      MCH 28.6 pg      MCHC 33.2 g/dL      RDW 16.3 %      RDW-SD 49.9 fl      MPV 8.1 fL      Platelets 305 10*3/mm3      Neutrophil % 66.1 %      Lymphocyte % 18.0 %      Monocyte % 10.4 %      Eosinophil % 4.3 %      Basophil % 1.2 %      Neutrophils, Absolute 4.80 10*3/mm3      Lymphocytes, Absolute 1.30 10*3/mm3      Monocytes, Absolute 0.80 10*3/mm3      Eosinophils, Absolute 0.30 10*3/mm3      Basophils, Absolute 0.10 10*3/mm3      nRBC 0.1 /100 WBC     Hemoglobin & Hematocrit, Blood [[id number]]  (Abnormal) Collected: 08/16/21 1120    Specimen: Blood Updated: 08/16/21 1133     Hemoglobin 7.6 g/dL      Hematocrit 22.9 %           Imaging Results (Last 24 Hours)     Procedure Component Value Units Date/Time    XR Chest 1 View [[id number]] Resulted: 08/17/21 0533     Updated: 08/17/21 0534      LAB RESULTS (LAST 7 DAYS)    CBC  Results from last 7 days   Lab Units 08/17/21  0538 08/16/21  1120 08/16/21  0436 08/14/21  0440 08/14/21  0432 08/14/21  0412 08/13/21  0321   WBC 10*3/mm3 7.60 7.20 7.10  --   --  10.30 7.70   RBC 10*6/mm3 2.73* 2.66* 2.26*  --   --  2.79* 2.54*   HEMOGLOBIN g/dL 7.7* 7.6*  7.6* 6.6*  --   --  8.1* 7.5*   HEMOGLOBIN, POC g/dL  --   --   --  10.7* 8.3*  --   --    HEMATOCRIT % 23.6* 22.9*  22.9* 19.5*  --   --  24.3* 22.1*    HEMATOCRIT POC %  --   --   --  31* 25*  --   --    MCV fL 86.3 86.2 86.4  --   --  86.9 87.1   PLATELETS 10*3/mm3 314 305 301  --   --  309 279       BMP  Results from last 7 days   Lab Units 08/17/21  0538 08/16/21  0308 08/15/21  0447 08/14/21  0800 08/14/21  0412 08/13/21  0734 08/13/21  0321 08/12/21  0432 08/12/21  0432 08/11/21  1253 08/11/21  0745   SODIUM mmol/L 134* 136 133*  --  134*  --  133*  --  132*  --  136   POTASSIUM mmol/L 3.2* 3.5 3.4* 4.3 4.5 4.3 4.4  4.4   < > 4.7  4.7   < > 5.4*  5.4*   CHLORIDE mmol/L 97* 93* 91*  --  88*  --  89*  --  89*  --  92*   CO2 mmol/L 28.0 32.0* 32.0*  --  33.0*  --  33.0*  --  33.0*  --  34.0*   BUN mg/dL 35* 57* 45*  --  87*  --  82*  --  77*  --  72*   CREATININE mg/dL 1.77* 2.45* 1.70*  --  2.73*  --  2.95*  --  2.93*  --  2.81*   GLUCOSE mg/dL 156* 167* 149*  --  182*  --  162*  --  161*  --  183*   MAGNESIUM mg/dL  --   --   --   --  2.2  --  2.2  --   --   --   --    PHOSPHORUS mg/dL 2.0* 3.5 2.7  --  5.4*  --  5.3*  --  5.3*  --  5.7*    < > = values in this interval not displayed.       CMP   Results from last 7 days   Lab Units 08/17/21  0538 08/16/21  0308 08/15/21  0447 08/14/21  0800 08/14/21  0412 08/13/21  0734 08/13/21 0321 08/12/21 0432 08/12/21  0432 08/11/21  1253 08/11/21  0745   SODIUM mmol/L 134* 136 133*  --  134*  --  133*  --  132*  --  136   POTASSIUM mmol/L 3.2* 3.5 3.4* 4.3 4.5 4.3 4.4  4.4   < > 4.7  4.7   < > 5.4*  5.4*   CHLORIDE mmol/L 97* 93* 91*  --  88*  --  89*  --  89*  --  92*   CO2 mmol/L 28.0 32.0* 32.0*  --  33.0*  --  33.0*  --  33.0*  --  34.0*   BUN mg/dL 35* 57* 45*  --  87*  --  82*  --  77*  --  72*   CREATININE mg/dL 1.77* 2.45* 1.70*  --  2.73*  --  2.95*  --  2.93*  --  2.81*   GLUCOSE mg/dL 156* 167* 149*  --  182*  --  162*  --  161*  --  183*   ALBUMIN g/dL 3.10* 3.20* 3.50  --  3.80  --  3.60  --  3.60  --  3.70    < > = values in this interval not displayed.         BNP        TROPONIN         CoAg  Results from last 7 days   Lab Units 08/14/21  0441   INR  1.17*   APTT seconds 33.4*       Creatinine Clearance  Estimated Creatinine Clearance: 45.5 mL/min (A) (by C-G formula based on SCr of 1.77 mg/dL (H)).    ABG  Results from last 7 days   Lab Units 08/14/21  1641 08/14/21  0626 08/14/21  0440 08/14/21  0432 08/11/21  0833 08/10/21  2240 08/10/21  2122   PH, ARTERIAL pH units 7.444 7.294* 7.266* 7.270* 7.361 7.342* 7.295*   PCO2, ARTERIAL mm Hg 49.7* 72.5* 75.5* 78.3* 55.7* 66.1* 74.1*   PO2 ART mm Hg 173.2* 193.8* 97.8 50.4* 42.8* 108.5* 93.3   O2 SATURATION ART % 99.6* 99.5* 96.0 77.7* 75.2* 97.6 95.8   BASE EXCESS ART mmol/L 9.0* 7.2* 5.5* 7.6* 5.4* 8.8* 8.0*       Radiology  XR Chest 1 View    Result Date: 8/16/2021  1. No significant change in the bibasilar airspace opacities and layering bilateral pleural effusions.  Electronically Signed By-Anselmo Vaz MD On:8/16/2021 8:17 AM This report was finalized on [id number] by  Anselmo Vaz MD.              EKG              I personally viewed and interpreted the patient's EKG/Telemetry data: Atrial fibrillation  ECHOCARDIOGRAM:    Results for orders placed during the hospital encounter of 07/20/21    Adult Transthoracic Echo Complete With Contrast if Necessary Per Protocol    Interpretation Summary  · Estimated left ventricular EF = 30% Left ventricular systolic function is moderately decreased.    Occasions  Shortness of breath.    Technically satisfactory study.  Mitral valve is structurally normal.  Mild mitral regurgitation.  Tricuspid valve is structurally normal.  Aortic valve is structurally normal.  Pulmonic valve could not be well visualized.  No evidence for tricuspid or aortic regurgitation is seen by Doppler study.  Biatrial and biventricular enlargement is present.  Diffuse left ventricular hypocontractility with ejection fraction of 30%.  Atrial septum is intact.  Aorta is normal.  No pericardial effusion or intracardiac  thrombus is seen.    Impression  Mild mitral regurgitation.  Significant biatrial and biventricular enlargement.  Diffuse left ventricular hypocontractility with ejection fraction of 30%.  No pericardial effusion or intracardiac thrombus is seen.          STRESS MYOVIEW:    Cardiolite (Tc-99m Sestamibi) stress test    CARDIAC CATHETERIZATION:            OTHER:         Assessment/Plan     Principal Problem:    Acute on chronic systolic heart failure (CMS/HCC)  Active Problems:    S/P CABG (coronary artery bypass graft)    Essential hypertension    Elevated troponin    ANNETTE treated with BiPAP    Major depressive disorder, recurrent, mild (CMS/HCC)    Mixed hyperlipidemia    History of cerebrovascular accident    Flaccid hemiplegia of right dominant side as late effect of cerebral infarction (CMS/HCC)    Diabetic neuropathy (CMS/HCC)    Unspecified dementia with behavioral disturbance (CMS/HCC)    Coronary artery disease    Obesity    Vitamin D deficiency    Chronic venous hypertension (idiopathic) with ulcer and inflammation of bilateral lower extremity (CMS/HCC)    Chronic obstructive pulmonary disease, unspecified (CMS/HCC)    Chronic foot ulcer (CMS/HCC)    Paroxysmal atrial fibrillation (CMS/HCC)    Acute kidney injury (CMS/HCC)    Type 2 diabetes mellitus with hyperglycemia (CMS/HCC)    CKD (chronic kidney disease) stage 3, GFR 30-59 ml/min (CMS/HCC)    Anemia of renal disease    Acute respiratory failure with hypoxia and hypercapnia (CMS/HCC)      [[[[[[[[[[[[[[[[[[[[[[[[[    Impression  ==============  -Increased shortness of breath and palpitations     -Recent acute on chronic congestive heart failure.  Compensated at this time  Recent echocardiogram showed left ventricle dysfunction with ejection fraction of 35%.     -History of right-sided weakness with left MCA territory stroke.-Improved      -status post CABG 2005. status post stent to LAD 2009    Status post stent to LAD 11/13/2015  Status post stent to  mid LAD and SVG to marginal branch 09/16/2016.  Status post stent to mid LAD 5/24/2021  Status post stent to SVG to RCA 5/26/2021     Cardiac catheterization 5/24/2021 revealed  Left ventricle angiogram was not performed due to pre-existing renal dysfunction.  Left main coronary artery normal.  Left anterior descending artery has mid segment lengthy 90% disease within the previously placed stent.  Distal left into descending artery has diffuse disease.  Circumflex coronary artery is totally occluded.  (Chronic)  Right coronary artery is chronically occluded.  SVG to marginal branch (jump graft to OM1 and OM 2) is totally occluded (new finding compared to 2015.  SVG to PDA has distal radiolucency.  However no definite obstructive disease is present.       -status post myocardial infarction 2000 and 2002 .      -history of intermittent atrial fibrillation-maintaining sinus rhythm     Transesophageal echocardiogram 11/30/2020.  Biatrial enlargement.  Smoking effect in the left atrium and left ventricle and left atrial appendage.  Mild mitral and aortic regurgitation.  Left ventricle enlargement with diffuse hypocontractility with ejection fraction of 35 to 40%.     Echocardiogram 11/27/2020 revealed left atrial enlargement left ventricle dysfunction with ejection fraction of 40%.  Negative bubble study.      -diabetes hypertension and sleep apnea in history of renal dysfunction .  Recent BUN/creatinine 38/1.36     -status post colon surgery (partial colectomy) appendectomy cholecystectomy right 4th toe removal and carpal tunnel surgery      -continued smoking 1 pack per day -abstinence from smoking      -allergy to codeine.  ============   Plan  ================  Atrial fibrillation-chronic  Rate is controlled.    Respiratory insufficiency.-Improved    Significant renal dysfunction-chronic  Patient is on dialysis now.  Likely long-term  Patient is feeling much better after dialysis was started.    Anemia  Hemoglobin  6.9.  Hemoglobin 7.7-8/17/2021    Status post CABG  Patient is not having any angina pectoris    Status post stent to LAD 5/24/2021.  Status post stent to SVG to RCA 5/26/2021.       Anticoagulation  Patient is on Eliquis.    Further plan will depend on patient's progress.   ]]]]]]]]]]]]]]]]]]]]]]           Jose G Rm MD  08/17/21  06:48 EDT

## 2021-08-17 NOTE — THERAPY TREATMENT NOTE
Subjective: Pt agreeable to therapeutic plan of care.  Cognition: arousal/alertness: Alert, Attentive and Inquisitive, safety/judgement: fair and awareness of deficits: good awareness of safety precautions and fair awareness of deficits    Objective:     Bed Mobility: Assist x 2 max (A) sup<>sit, dependent X2 to scoot though assists pushing through legs.  Functional Transfers: Mod-A and Assist x 2 to come to stand. Able for < 15 seconds to achieve upright posture requiring only CGA X2.    Grooming: Mod-A  ADL Position: edge of bed sitting  ADL Comments: washed face, washed hair, brushed hair & beard.    Pain: 3 VAS leg pain, stiffness  Education: Provided education on importance of mobility and skilled verbal / tactile cueing throughout intervention.     Assessment: Benson Mclean presents with ADL impairments below baseline abilities which indicate the need for continued skilled intervention while inpatient. Tolerating session today without incident. Tolerates sitting up and standing with more endurance & able to participate better in EOB ADl tasks & no c/o SOA though on increased O2 on Precision flow now. 25LPM, 60% flow. Will continue to follow and progress as tolerated. Will require extensive rehab at d/c. Pt always eager for therapy.    Plan/Recommendations:   Pt would benefit from Skilled Rehab placement at discharge from facility.   Pt desires Skilled Rehab placement at discharge. Pt cooperative; agreeable to therapeutic recommendations and plan of care.     Modified [place]: 4 = Moderately severe disability (Unable to attend to own bodily needs without assistance, and unable to walk unassisted)     Post-Tx Position: Supine with HOB Elevated, Alarms activated and Call light and personal items within reach  PPE: gloves, surgical mask, eyewear protection

## 2021-08-17 NOTE — PROGRESS NOTES
"PULMONARY CRITICAL CARE Progress  NOTE      PATIENT IDENTIFICATION:  Name: Benson Mclean  MRN: [id number]  :  1950     Age: 70 y.o.  Sex: male    DATE OF Note:  2021   Referring Physician: Angel Prajapati MD                  Subjective:   Feeling a little better, still on -25L/80% alternating with AVAPS  No chest or abd pain, no bowel or bladder issues   Still in A-fib       Objective:  tMax 24 hrs: Temp (24hrs), Av.6 °F (36.4 °C), Min:97.3 °F (36.3 °C), Max:98.4 °F (36.9 °C)      Vitals Ranges:   Temp:  [97.3 °F (36.3 °C)-98.4 °F (36.9 °C)] 98.4 °F (36.9 °C)  Heart Rate:  [] 96  Resp:  [12-22] 16  BP: ()/(45-61) 87/51    Intake and Output Last 3 Shifts:   I/O last 3 completed shifts:  In: 590 [P.O.:240; Blood:350]  Out: 4775 [Urine:775; Other:4000]    Exam:  BP (!) 87/51   Pulse 96   Temp 98.4 °F (36.9 °C) (Oral)   Resp 16   Ht 170.2 cm (67.01\")   Wt 108 kg (238 lb 12.1 oz)   SpO2 98%   BMI 37.39 kg/m²     General Appearance:  Alert   HEENT:  Normocephalic, without obvious abnormality, Conjunctiva/corneas clear.  Normal external ear canals, Nares normal, no drainage     Neck:  Supple, symmetrical, trachea midline. No JVD.  Lungs /Chest wall: Scattered bilateral rhonchi, no wheezing, bibasilar crackles, respirations unlabored symmetrical wall movement.     Heart: Irregularly irregular, atrial fibrillation, +DEON, no rub or gallop  Abdomen: Soft, non-tender, active bowel sounds  Extremities: Trace BLE edema, no clubbing or cyanosis        Medications:    Current Facility-Administered Medications:   •  acetaminophen (TYLENOL) tablet 650 mg, 650 mg, Oral, Q4H PRN, 650 mg at 21 1713 **OR** acetaminophen (TYLENOL) 160 MG/5ML solution 650 mg, 650 mg, Oral, Q4H PRN **OR** acetaminophen (TYLENOL) suppository 650 mg, 650 mg, Rectal, Q4H PRN, Rayne Bah, FABIOLA  •  aluminum-magnesium hydroxide-simethicone (MAALOX MAX) 400-400-40 MG/5ML suspension 15 mL, 15 mL, Oral, Q6H PRN, " Rayne Bah APRN  •  amiodarone (PACERONE) tablet 200 mg, 200 mg, Oral, Q12H, Jose G Rm MD, 200 mg at 08/17/21 0802  •  apixaban (ELIQUIS) tablet 5 mg, 5 mg, Oral, Q12H, Marcelo Álvarez MD, 5 mg at 08/17/21 0802  •  aspirin chewable tablet 81 mg, 81 mg, Oral, Daily, Rayne Bah APRN, 81 mg at 08/17/21 0802  •  atorvastatin (LIPITOR) tablet 40 mg, 40 mg, Oral, Nightly, Rayne Bah APRN, 40 mg at 08/17/21 0123  •  budesonide (PULMICORT) nebulizer solution 0.5 mg, 0.5 mg, Nebulization, BID - RT, Marcelo Álvarez MD, 0.5 mg at 08/17/21 0714  •  bumetanide (BUMEX) injection 2 mg, 2 mg, Intravenous, Q12H, Yony Bhat MD, 2 mg at 08/17/21 1229  •  carvedilol (COREG) tablet 6.25 mg, 6.25 mg, Oral, BID With Meals, Jose G Rm MD, 6.25 mg at 08/17/21 0923  •  cholecalciferol (VITAMIN D3) tablet 1,000 Units, 1,000 Units, Oral, Daily, Rayne Bah APRN, 1,000 Units at 08/17/21 0802  •  dextrose (D50W) 25 g/ 50mL Intravenous Solution 25 g, 25 g, Intravenous, Q15 Min PRN, Stanton Junior MD  •  dextrose (GLUTOSE) oral gel 15 g, 15 g, Oral, Q15 Min PRN, Stanton Junior MD  •  dilTIAZem CD (CARDIZEM CD) 24 hr capsule 240 mg, 240 mg, Oral, Q24H, Jose G Rm MD, 240 mg at 08/16/21 0839  •  donepezil (ARICEPT) tablet 10 mg, 10 mg, Oral, Nightly, Rayne Bah APRN, 10 mg at 08/17/21 0123  •  DULoxetine (CYMBALTA) DR capsule 60 mg, 60 mg, Oral, Daily, Rayne Bah APRN, 60 mg at 08/17/21 0802  •  epoetin izabella-epbx (RETACRIT) injection 20,000 Units, 20,000 Units, Subcutaneous, Weekly, Yony Bhat MD, 20,000 Units at 08/13/21 2131  •  glucagon (human recombinant) (GLUCAGEN DIAGNOSTIC) injection 1 mg, 1 mg, Subcutaneous, Q15 Min PRN, Stanton Junior MD  •  heparin (porcine) injection 4,000 Units, 4,000 Units, Intracatheter, PRN, Crow Vences MD, 2,200 Units at 08/16/21 8259  •  hydrALAZINE (APRESOLINE) tablet 50 mg, 50 mg, Oral, Q12H, Rayne Bah APRN, 50 mg at 08/16/21  0839  •  insulin lispro (ADMELOG) injection 0-14 Units, 0-14 Units, Subcutaneous, TID AC, 10 Units at 08/17/21 1229 **AND** insulin lispro (ADMELOG) injection 0-14 Units, 0-14 Units, Subcutaneous, PRN, Marcelo Álvarez MD  •  ipratropium-albuterol (DUO-NEB) nebulizer solution 3 mL, 3 mL, Nebulization, Q4H - RT, Marcelo Álvarez MD, 3 mL at 08/17/21 1526  •  isosorbide mononitrate (IMDUR) 24 hr tablet 60 mg, 60 mg, Oral, BID - Nitrates, Rayne Bah APRN, 60 mg at 08/17/21 0922  •  melatonin tablet 5 mg, 5 mg, Oral, Nightly PRN, Rayne Bah APRN  •  metOLazone (ZAROXOLYN) tablet 5 mg, 5 mg, Oral, Daily, Katerina Beyer MD, 5 mg at 08/17/21 0923  •  nitroglycerin (NITROSTAT) SL tablet 0.4 mg, 0.4 mg, Sublingual, Q5 Min PRN, Rayne Bah APRN  •  ondansetron (ZOFRAN) tablet 4 mg, 4 mg, Oral, Q6H PRN **OR** ondansetron (ZOFRAN) injection 4 mg, 4 mg, Intravenous, Q6H PRN, Rayne Bah APRN, 4 mg at 08/15/21 0510  •  potassium phosphate 40 mmol in sodium chloride 0.9 % 250 mL infusion, 40 mmol, Intravenous, Once, Yony Bhat MD  •  [COMPLETED] Insert peripheral IV, , , Once **AND** sodium chloride 0.9 % flush 10 mL, 10 mL, Intravenous, PRN, Davis Zavala MD  •  sodium chloride 0.9 % flush 10 mL, 10 mL, Intravenous, Q12H, Rayne Bah APRN, 10 mL at 08/17/21 0954  •  sodium chloride 0.9 % flush 10 mL, 10 mL, Intravenous, PRN, Rayne Bah APRN  •  vitamin B-12 (CYANOCOBALAMIN) tablet 1,000 mcg, 1,000 mcg, Oral, Daily, Rayne Bah, FAIBOLA, 1,000 mcg at 08/17/21 0802    Data Review:  All labs (24hrs):   Recent Results (from the past 24 hour(s))   POC Glucose Once    Collection Time: 08/16/21  4:24 PM    Specimen: Blood   Result Value Ref Range    Glucose 158 (H) 70 - 105 mg/dL   POC Glucose Once    Collection Time: 08/16/21  8:37 PM    Specimen: Blood   Result Value Ref Range    Glucose 219 (H) 70 - 105 mg/dL   Prepare RBC, 1 Units    Collection Time: 08/17/21  2:00 AM   Result Value Ref Range     Product Code M3784I73     Unit Number [id number]     UNIT  ABO A     UNIT  RH POS     Crossmatch Interpretation Compatible     Dispense Status PT     Blood Expiration Date [id number]     Blood Type Barcode 6200    Renal Function Panel    Collection Time: 08/17/21  5:38 AM    Specimen: Blood   Result Value Ref Range    Glucose 156 (H) 65 - 99 mg/dL    BUN 35 (H) 8 - 23 mg/dL    Creatinine 1.77 (H) 0.76 - 1.27 mg/dL    Sodium 134 (L) 136 - 145 mmol/L    Potassium 3.2 (L) 3.5 - 5.2 mmol/L    Chloride 97 (L) 98 - 107 mmol/L    CO2 28.0 22.0 - 29.0 mmol/L    Calcium 7.4 (L) 8.6 - 10.5 mg/dL    Albumin 3.10 (L) 3.50 - 5.20 g/dL    Phosphorus 2.0 (L) 2.5 - 4.5 mg/dL    Anion Gap 9.0 5.0 - 15.0 mmol/L    BUN/Creatinine Ratio 19.8 7.0 - 25.0    eGFR Non African Amer 38 (L) >60 mL/min/1.73   CBC Auto Differential    Collection Time: 08/17/21  5:38 AM    Specimen: Blood   Result Value Ref Range    WBC 7.60 3.40 - 10.80 10*3/mm3    RBC 2.73 (L) 4.14 - 5.80 10*6/mm3    Hemoglobin 7.7 (L) 13.0 - 17.7 g/dL    Hematocrit 23.6 (L) 37.5 - 51.0 %    MCV 86.3 79.0 - 97.0 fL    MCH 28.1 26.6 - 33.0 pg    MCHC 32.5 31.5 - 35.7 g/dL    RDW 16.3 (H) 12.3 - 15.4 %    RDW-SD 49.9 37.0 - 54.0 fl    MPV 7.2 6.0 - 12.0 fL    Platelets 314 140 - 450 10*3/mm3    Neutrophil % 59.8 42.7 - 76.0 %    Lymphocyte % 21.8 19.6 - 45.3 %    Monocyte % 10.7 5.0 - 12.0 %    Eosinophil % 7.1 (H) 0.3 - 6.2 %    Basophil % 0.6 0.0 - 1.5 %    Neutrophils, Absolute 4.60 1.70 - 7.00 10*3/mm3    Lymphocytes, Absolute 1.70 0.70 - 3.10 10*3/mm3    Monocytes, Absolute 0.80 0.10 - 0.90 10*3/mm3    Eosinophils, Absolute 0.50 (H) 0.00 - 0.40 10*3/mm3    Basophils, Absolute 0.00 0.00 - 0.20 10*3/mm3    nRBC 0.0 0.0 - 0.2 /100 WBC   POC Glucose Once    Collection Time: 08/17/21  7:29 AM    Specimen: Blood   Result Value Ref Range    Glucose 175 (H) 70 - 105 mg/dL   POC Glucose Once    Collection Time: 08/17/21 11:06 AM    Specimen: Blood   Result Value Ref Range     Glucose 300 (H) 70 - 105 mg/dL        Imaging:  XR Chest 1 View  Narrative: DATE OF EXAM:  8/17/2021 4:50 AM     PROCEDURE:  XR CHEST 1 VW-     INDICATIONS:  Shortness of breath; R00.0-Tachycardia, unspecified; R06.00-Dyspnea,  unspecified     COMPARISON:  08/16/2021     TECHNIQUE:   Single radiographic AP view of the chest was obtained.     FINDINGS:  Cardiac size remains normal. There are postoperative changes of prior  CABG. Right IJ line terminates in the SVC. There is continued pulmonary  vascular congestion and evidence of interstitial edema. There are  bilateral dependent pleural effusions with airspace disease in both lung  bases.      Impression:    1. Continued findings of congestive heart failure and interstitial  pulmonary edema.  2. Bilateral dependent pleural effusions. Bibasilar airspace disease.     Electronically Signed By-Oscar Casper MD On:8/17/2021 7:24 AM  This report was finalized on [id number] by  Oscar Casper MD.       ASSESSMENT:  Pneumonia  COPD  Bilateral pleural effusion  Atrial fibrillation with RVR   Acute on chronic systolic heart failure  CAD S/P CABG   HTN  Elevated troponin  ANNETTE uses BiPAP  Hyperlipidemia  Flaccid hemiplegia of right dominant side as late effect of cerebral infarction   DM II with neuropathy   Dementia with behavioral disturbance  Chronic venous hypertension (idiopathic) with ulcer/inflammation of BLE  Chronic foot ulcer   CKD   Tobacco use disorder   Anemia      PLAN:  Encourage OOB daily   O2 support titrated to maintain a saturation of 88 to 91%  Currently alternating precision flow with AVAPS as tolerated  Continue anticoagulation for now  Nephrology following closely, plan for HD today  Volume management per nephrology  Bronchodilator  Inhaled corticosteroids  Electrolytes/ glycemic control  DVT and GI prophylaxis.  Nutrition: P.o. diet and supplementation with boost.  Small amounts of intake as tolerated by saturations/O2 needs    Discussed with Dr Álvarez      Arianna Gamez, APRN    8/17/2021  15:42 EDT         I personally have examined  and interviewed the patient. I have reviewed the history, data, problems, assessment and plan with our NP.  Critical care time in direct medical management (   ) minutes  Electronically signed by Marcelo Álvarez MD D,[facility], 08/17/21, 9:39 PM EDT.

## 2021-08-17 NOTE — PLAN OF CARE
Pt politely refused this date, stating he had just finished with OT. PT to follow up tomorrow if available.  Isac Shi PT, DPT

## 2021-08-17 NOTE — PROGRESS NOTES
:the pt. feeling better report he can breathe better now he had dialysis yesterday with uFR of 4 L    Vital Signs  Vitals:    08/17/21 1230   BP: 94/55   Pulse: 66   Resp:    Temp:    SpO2: 95%     I/O this shift:  In: 140 [P.O.:140]  Out: 300 [Urine:300]  I/O last 3 completed shifts:  In: 590 [P.O.:240; Blood:350]  Out: 4775 [Urine:775; Other:4000]      Physical Exam:    General: In no acute distress  HEENT:  Normocephalic, Atraumatic, EOMI, PERRLA, NO icterus,  Neck:  Supple, No JVD, No Carotid artery bruit, No lymphadenopathy  Chest: Clear to auscultation bilaterally  Cardiovascular: Regular rate and rhythm. Positive S1 & S2, No rub, murmur or gallop.  Abdominal: Soft, nontender, no palpable organomegaly, no abdominal bruit, positive bowel sounds  Musculoskeletal: No joint tenderness or swelling, good range of motion, Adequate muscle strength on both sides, no cyanosis, clubbing or edema on lower extremities.  Neuro: Alert oriented x3, CN1 - 12 intact, No focal sensory or motor deficit.      Review of Systems:   CNS: Denied headaches, blurred vision, tingling or numbness in any part of body. No weakness or any impaired speech.  CVS: No chest pain or shortness of breath, No orthopnea or PND or exertional dyspnea,  Pulmonary: Denies shortness of breath, coughs, hematemesis, night sweats or weight loss.  GI: Denies diarrhea, nausea, vomiting. Denies weight loss, hematemesis, melena.  : Denies dysuria, frequency or hesitancy of urination  Vascular: Denies claudication, resting pain, tingling numbness or weakness in any part of the body.  Musculoskeletal: Denies Joint tenderness or pain, denies stiffness in joints, denies fever or weight loss, or skin rashes.    Current Labs  [unfilled]  Lab Results (last 24 hours)     Procedure Component Value Units Date/Time    POC Glucose Once [[id number]]  (Abnormal) Collected: 08/17/21 1106    Specimen: Blood Updated: 08/17/21 1108     Glucose 300 mg/dL      Comment: Serial  Number: [id number]:  [id number]       POC Glucose Once [[id number]]  (Abnormal) Collected: 08/17/21 0729    Specimen: Blood Updated: 08/17/21 0729     Glucose 175 mg/dL      Comment: Serial Number: [id number]:  [id number]       Renal Function Panel [[id number]]  (Abnormal) Collected: 08/17/21 0538    Specimen: Blood Updated: 08/17/21 0623     Glucose 156 mg/dL      BUN 35 mg/dL      Creatinine 1.77 mg/dL      Sodium 134 mmol/L      Potassium 3.2 mmol/L      Chloride 97 mmol/L      CO2 28.0 mmol/L      Calcium 7.4 mg/dL      Albumin 3.10 g/dL      Phosphorus 2.0 mg/dL      Anion Gap 9.0 mmol/L      BUN/Creatinine Ratio 19.8     eGFR Non African Amer 38 mL/min/1.73     Narrative:      GFR Normal >60  Chronic Kidney Disease <60  Kidney Failure <15      CBC & Differential [[id number]]  (Abnormal) Collected: 08/17/21 0538    Specimen: Blood Updated: 08/17/21 0548    Narrative:      The following orders were created for panel order CBC & Differential.  Procedure                               Abnormality         Status                     ---------                               -----------         ------                     CBC Auto Differential[[id number]]        Abnormal            Final result                 Please view results for these tests on the individual orders.    CBC Auto Differential [[id number]]  (Abnormal) Collected: 08/17/21 0538    Specimen: Blood Updated: 08/17/21 0548     WBC 7.60 10*3/mm3      RBC 2.73 10*6/mm3      Hemoglobin 7.7 g/dL      Hematocrit 23.6 %      MCV 86.3 fL      MCH 28.1 pg      MCHC 32.5 g/dL      RDW 16.3 %      RDW-SD 49.9 fl      MPV 7.2 fL      Platelets 314 10*3/mm3      Neutrophil % 59.8 %      Lymphocyte % 21.8 %      Monocyte % 10.7 %      Eosinophil % 7.1 %      Basophil % 0.6 %      Neutrophils, Absolute 4.60 10*3/mm3      Lymphocytes, Absolute 1.70 10*3/mm3      Monocytes, Absolute 0.80 10*3/mm3      Eosinophils, Absolute 0.50 10*3/mm3      Basophils, Absolute 0.00  10*3/mm3      nRBC 0.0 /100 WBC     POC Glucose Once [[id number]]  (Abnormal) Collected: 08/16/21 2037    Specimen: Blood Updated: 08/16/21 2038     Glucose 219 mg/dL      Comment: Serial Number: [id number]:  [id number]           Current Radiology  Imaging Results (Last 24 Hours)     Procedure Component Value Units Date/Time    XR Chest 1 View [[id number]] Collected: 08/17/21 0724     Updated: 08/17/21 0727    Narrative:      DATE OF EXAM:  8/17/2021 4:50 AM     PROCEDURE:  XR CHEST 1 VW-     INDICATIONS:  Shortness of breath; R00.0-Tachycardia, unspecified; R06.00-Dyspnea,  unspecified     COMPARISON:  08/16/2021     TECHNIQUE:   Single radiographic AP view of the chest was obtained.     FINDINGS:  Cardiac size remains normal. There are postoperative changes of prior  CABG. Right IJ line terminates in the SVC. There is continued pulmonary  vascular congestion and evidence of interstitial edema. There are  bilateral dependent pleural effusions with airspace disease in both lung  bases.        Impression:         1. Continued findings of congestive heart failure and interstitial  pulmonary edema.  2. Bilateral dependent pleural effusions. Bibasilar airspace disease.     Electronically Signed By-Oscar Casper MD On:8/17/2021 7:24 AM  This report was finalized on [id number] by  Oscar Casper MD.        Current Meds    Current Facility-Administered Medications:   •  acetaminophen (TYLENOL) tablet 650 mg, 650 mg, Oral, Q4H PRN, 650 mg at 08/09/21 1713 **OR** acetaminophen (TYLENOL) 160 MG/5ML solution 650 mg, 650 mg, Oral, Q4H PRN **OR** acetaminophen (TYLENOL) suppository 650 mg, 650 mg, Rectal, Q4H PRN, Rayne Bah, APRLIVIA  •  aluminum-magnesium hydroxide-simethicone (MAALOX MAX) 400-400-40 MG/5ML suspension 15 mL, 15 mL, Oral, Q6H PRN, Rayne Bah, APRN  •  amiodarone (PACERONE) tablet 200 mg, 200 mg, Oral, Q12H, Jose G Rm MD, 200 mg at 08/17/21 0802  •  apixaban (ELIQUIS) tablet 5 mg, 5 mg, Oral,  Q12H, Marcelo Álvarez MD, 5 mg at 08/17/21 0802  •  aspirin chewable tablet 81 mg, 81 mg, Oral, Daily, Rayne Bah APRN, 81 mg at 08/17/21 0802  •  atorvastatin (LIPITOR) tablet 40 mg, 40 mg, Oral, Nightly, Rayne Bah APRN, 40 mg at 08/17/21 0123  •  budesonide (PULMICORT) nebulizer solution 0.5 mg, 0.5 mg, Nebulization, BID - RT, Marcelo Álvarez MD, 0.5 mg at 08/17/21 0714  •  bumetanide (BUMEX) injection 2 mg, 2 mg, Intravenous, Q12H, Yony Bhat MD, 2 mg at 08/17/21 1229  •  carvedilol (COREG) tablet 6.25 mg, 6.25 mg, Oral, BID With Meals, Jose G Rm MD, 6.25 mg at 08/17/21 0923  •  cholecalciferol (VITAMIN D3) tablet 1,000 Units, 1,000 Units, Oral, Daily, Rayne Bah APRN, 1,000 Units at 08/17/21 0802  •  dextrose (D50W) 25 g/ 50mL Intravenous Solution 25 g, 25 g, Intravenous, Q15 Min PRN, Stanton Junior MD  •  dextrose (GLUTOSE) oral gel 15 g, 15 g, Oral, Q15 Min PRN, Stanton Junior MD  •  dilTIAZem CD (CARDIZEM CD) 24 hr capsule 240 mg, 240 mg, Oral, Q24H, Jose G Rm MD, 240 mg at 08/16/21 0839  •  donepezil (ARICEPT) tablet 10 mg, 10 mg, Oral, Nightly, Rayne Bah APRN, 10 mg at 08/17/21 0123  •  DULoxetine (CYMBALTA) DR capsule 60 mg, 60 mg, Oral, Daily, Rayne Bah APRN, 60 mg at 08/17/21 0802  •  epoetin izabella-epbx (RETACRIT) injection 20,000 Units, 20,000 Units, Subcutaneous, Weekly, Yony Bhat MD, 20,000 Units at 08/13/21 2131  •  glucagon (human recombinant) (GLUCAGEN DIAGNOSTIC) injection 1 mg, 1 mg, Subcutaneous, Q15 Min PRN, Stanton Junior MD  •  heparin (porcine) injection 4,000 Units, 4,000 Units, Intracatheter, PRN, Crow Vences MD, 2,200 Units at 08/16/21 2349  •  hydrALAZINE (APRESOLINE) tablet 50 mg, 50 mg, Oral, Q12H, Rayne Bah, APRN, 50 mg at 08/16/21 0839  •  insulin lispro (ADMELOG) injection 0-14 Units, 0-14 Units, Subcutaneous, TID AC, 10 Units at 08/17/21 1229 **AND** insulin lispro (ADMELOG) injection 0-14 Units, 0-14  Units, Subcutaneous, PRN, Marcelo Álvarez MD  •  ipratropium-albuterol (DUO-NEB) nebulizer solution 3 mL, 3 mL, Nebulization, Q4H - RT, Marcelo Álvarez MD, 3 mL at 08/17/21 1124  •  isosorbide mononitrate (IMDUR) 24 hr tablet 60 mg, 60 mg, Oral, BID - Nitrates, Rayne Bah APRN, 60 mg at 08/17/21 0922  •  melatonin tablet 5 mg, 5 mg, Oral, Nightly PRN, Rayne Bah APRN  •  metOLazone (ZAROXOLYN) tablet 5 mg, 5 mg, Oral, Daily, Katerina Beyer MD, 5 mg at 08/17/21 0923  •  nitroglycerin (NITROSTAT) SL tablet 0.4 mg, 0.4 mg, Sublingual, Q5 Min PRN, Rayne Bah APRN  •  ondansetron (ZOFRAN) tablet 4 mg, 4 mg, Oral, Q6H PRN **OR** ondansetron (ZOFRAN) injection 4 mg, 4 mg, Intravenous, Q6H PRN, Rayne Bah APRN, 4 mg at 08/15/21 0510  •  [COMPLETED] Insert peripheral IV, , , Once **AND** sodium chloride 0.9 % flush 10 mL, 10 mL, Intravenous, PRN, Davis Zavala MD  •  sodium chloride 0.9 % flush 10 mL, 10 mL, Intravenous, Q12H, Rayne Bah APRN, 10 mL at 08/17/21 0954  •  sodium chloride 0.9 % flush 10 mL, 10 mL, Intravenous, PRN, Rayne Bah APRLIVIA  •  vitamin B-12 (CYANOCOBALAMIN) tablet 1,000 mcg, 1,000 mcg, Oral, Daily, Rayne Bah APRN, 1,000 mcg at 08/17/21 0802              Patient Active Problem List   Diagnosis   • S/P CABG (coronary artery bypass graft)   • Essential hypertension   • Elevated troponin   • Closed fracture of seventh thoracic vertebra (CMS/HCC)   • Status post coronary artery stent placement   • ANNETTE treated with BiPAP   • Major depressive disorder, recurrent, mild (CMS/HCC)   • Lymphadenopathy, mediastinal   • Mixed hyperlipidemia   • History of cerebrovascular accident   • History of amputation of lesser toe (CMS/HCC)   • Flaccid hemiplegia of right dominant side as late effect of cerebral infarction (CMS/HCC)   • Diabetic neuropathy (CMS/HCC)   • Unspecified dementia with behavioral disturbance (CMS/HCC)   • Coronary artery disease   • Constipation   • Obesity   •  Vitamin D deficiency   • Chronic venous hypertension (idiopathic) with ulcer and inflammation of bilateral lower extremity (CMS/HCC)   • Chronic obstructive pulmonary disease, unspecified (CMS/HCC)   • Chronic foot ulcer (CMS/HCC)   • Chronic left-sided low back pain without sciatica   • Paroxysmal atrial fibrillation (CMS/HCC)   • Arthritis   • Acute kidney injury (CMS/HCC)   • Type 2 diabetes mellitus with hyperglycemia (CMS/HCC)   • Abnormal nuclear stress test   • CKD (chronic kidney disease) stage 3, GFR 30-59 ml/min (CMS/HCC)   • Acute on chronic systolic heart failure (CMS/HCC)   • Anemia of renal disease   • Acute respiratory failure with hypoxia and hypercapnia (CMS/HCC)   Acute on chronic renal failure with pulmonary edema initiated on dialysis improved oxygenation, will do another dialysis tomorrow, will watch for residual renal function to determine outpatient dialysis need and tunneled cath placement        Katerina Beyer MD FACP, FASN.  08/17/21  13:32 EDT

## 2021-08-18 LAB
ALBUMIN SERPL-MCNC: 3.4 G/DL (ref 3.5–5.2)
ANION GAP SERPL CALCULATED.3IONS-SCNC: 12 MMOL/L (ref 5–15)
BASOPHILS # BLD AUTO: 0 10*3/MM3 (ref 0–0.2)
BASOPHILS NFR BLD AUTO: 0.5 % (ref 0–1.5)
BUN SERPL-MCNC: 49 MG/DL (ref 8–23)
BUN/CREAT SERPL: 19.8 (ref 7–25)
CALCIUM SPEC-SCNC: 8.6 MG/DL (ref 8.6–10.5)
CHLORIDE SERPL-SCNC: 97 MMOL/L (ref 98–107)
CO2 SERPL-SCNC: 28 MMOL/L (ref 22–29)
CREAT SERPL-MCNC: 2.48 MG/DL (ref 0.76–1.27)
DEPRECATED RDW RBC AUTO: 49.4 FL (ref 37–54)
EOSINOPHIL # BLD AUTO: 0.8 10*3/MM3 (ref 0–0.4)
EOSINOPHIL NFR BLD AUTO: 7.5 % (ref 0.3–6.2)
ERYTHROCYTE [DISTWIDTH] IN BLOOD BY AUTOMATED COUNT: 16.2 % (ref 12.3–15.4)
GFR SERPL CREATININE-BSD FRML MDRD: 26 ML/MIN/1.73
GLUCOSE BLDC GLUCOMTR-MCNC: 182 MG/DL (ref 70–105)
GLUCOSE BLDC GLUCOMTR-MCNC: 198 MG/DL (ref 70–105)
GLUCOSE BLDC GLUCOMTR-MCNC: 245 MG/DL (ref 70–105)
GLUCOSE BLDC GLUCOMTR-MCNC: 263 MG/DL (ref 70–105)
GLUCOSE SERPL-MCNC: 149 MG/DL (ref 65–99)
HBV SURFACE AB SER RIA-ACNC: NORMAL
HCT VFR BLD AUTO: 26 % (ref 37.5–51)
HGB BLD-MCNC: 8.6 G/DL (ref 13–17.7)
LYMPHOCYTES # BLD AUTO: 2 10*3/MM3 (ref 0.7–3.1)
LYMPHOCYTES NFR BLD AUTO: 20 % (ref 19.6–45.3)
MAGNESIUM SERPL-MCNC: 1.9 MG/DL (ref 1.6–2.4)
MCH RBC QN AUTO: 28.5 PG (ref 26.6–33)
MCHC RBC AUTO-ENTMCNC: 33 G/DL (ref 31.5–35.7)
MCV RBC AUTO: 86.5 FL (ref 79–97)
MONOCYTES # BLD AUTO: 1 10*3/MM3 (ref 0.1–0.9)
MONOCYTES NFR BLD AUTO: 9.8 % (ref 5–12)
NEUTROPHILS NFR BLD AUTO: 6.2 10*3/MM3 (ref 1.7–7)
NEUTROPHILS NFR BLD AUTO: 62.2 % (ref 42.7–76)
NRBC BLD AUTO-RTO: 0.1 /100 WBC (ref 0–0.2)
PHOSPHATE SERPL-MCNC: 5.3 MG/DL (ref 2.5–4.5)
PLATELET # BLD AUTO: 383 10*3/MM3 (ref 140–450)
PMV BLD AUTO: 7.6 FL (ref 6–12)
POTASSIUM SERPL-SCNC: 4.3 MMOL/L (ref 3.5–5.2)
RBC # BLD AUTO: 3 10*6/MM3 (ref 4.14–5.8)
SODIUM SERPL-SCNC: 137 MMOL/L (ref 136–145)
WBC # BLD AUTO: 10 10*3/MM3 (ref 3.4–10.8)

## 2021-08-18 PROCEDURE — 99232 SBSQ HOSP IP/OBS MODERATE 35: CPT | Performed by: HOSPITALIST

## 2021-08-18 PROCEDURE — 94799 UNLISTED PULMONARY SVC/PX: CPT

## 2021-08-18 PROCEDURE — 99233 SBSQ HOSP IP/OBS HIGH 50: CPT | Performed by: INTERNAL MEDICINE

## 2021-08-18 PROCEDURE — 97116 GAIT TRAINING THERAPY: CPT

## 2021-08-18 PROCEDURE — 63710000001 INSULIN LISPRO (HUMAN) PER 5 UNITS: Performed by: INTERNAL MEDICINE

## 2021-08-18 PROCEDURE — 85025 COMPLETE CBC W/AUTO DIFF WBC: CPT | Performed by: INTERNAL MEDICINE

## 2021-08-18 PROCEDURE — 97530 THERAPEUTIC ACTIVITIES: CPT

## 2021-08-18 PROCEDURE — 86704 HEP B CORE ANTIBODY TOTAL: CPT | Performed by: INTERNAL MEDICINE

## 2021-08-18 PROCEDURE — 80069 RENAL FUNCTION PANEL: CPT | Performed by: INTERNAL MEDICINE

## 2021-08-18 PROCEDURE — 83735 ASSAY OF MAGNESIUM: CPT | Performed by: INTERNAL MEDICINE

## 2021-08-18 PROCEDURE — 82962 GLUCOSE BLOOD TEST: CPT

## 2021-08-18 PROCEDURE — 86706 HEP B SURFACE ANTIBODY: CPT | Performed by: INTERNAL MEDICINE

## 2021-08-18 PROCEDURE — 94660 CPAP INITIATION&MGMT: CPT

## 2021-08-18 RX ORDER — ALBUMIN (HUMAN) 12.5 G/50ML
12.5 SOLUTION INTRAVENOUS AS NEEDED
Status: DISPENSED | OUTPATIENT
Start: 2021-08-18 | End: 2021-08-19

## 2021-08-18 RX ORDER — CARVEDILOL 6.25 MG/1
12.5 TABLET ORAL EVERY 12 HOURS SCHEDULED
Status: DISCONTINUED | OUTPATIENT
Start: 2021-08-18 | End: 2021-08-25 | Stop reason: HOSPADM

## 2021-08-18 RX ADMIN — IPRATROPIUM BROMIDE AND ALBUTEROL SULFATE 3 ML: 2.5; .5 SOLUTION RESPIRATORY (INHALATION) at 22:33

## 2021-08-18 RX ADMIN — IPRATROPIUM BROMIDE AND ALBUTEROL SULFATE 3 ML: 2.5; .5 SOLUTION RESPIRATORY (INHALATION) at 12:00

## 2021-08-18 RX ADMIN — Medication 10 ML: at 22:59

## 2021-08-18 RX ADMIN — AMIODARONE HYDROCHLORIDE 200 MG: 200 TABLET ORAL at 08:50

## 2021-08-18 RX ADMIN — ASPIRIN 81 MG: 81 TABLET, CHEWABLE ORAL at 08:49

## 2021-08-18 RX ADMIN — Medication 1000 UNITS: at 08:49

## 2021-08-18 RX ADMIN — IPRATROPIUM BROMIDE AND ALBUTEROL SULFATE 3 ML: 2.5; .5 SOLUTION RESPIRATORY (INHALATION) at 02:57

## 2021-08-18 RX ADMIN — DONEPEZIL HYDROCHLORIDE 10 MG: 5 TABLET, FILM COATED ORAL at 23:03

## 2021-08-18 RX ADMIN — CYANOCOBALAMIN TAB 1000 MCG 1000 MCG: 1000 TAB at 08:50

## 2021-08-18 RX ADMIN — IPRATROPIUM BROMIDE AND ALBUTEROL SULFATE 3 ML: 2.5; .5 SOLUTION RESPIRATORY (INHALATION) at 08:15

## 2021-08-18 RX ADMIN — AMIODARONE HYDROCHLORIDE 200 MG: 200 TABLET ORAL at 23:03

## 2021-08-18 RX ADMIN — INSULIN LISPRO 8 UNITS: 100 INJECTION, SOLUTION INTRAVENOUS; SUBCUTANEOUS at 13:29

## 2021-08-18 RX ADMIN — ATORVASTATIN CALCIUM 40 MG: 40 TABLET, FILM COATED ORAL at 23:03

## 2021-08-18 RX ADMIN — DULOXETINE 60 MG: 30 CAPSULE, DELAYED RELEASE ORAL at 08:50

## 2021-08-18 RX ADMIN — DILTIAZEM HYDROCHLORIDE 240 MG: 240 CAPSULE, COATED, EXTENDED RELEASE ORAL at 11:06

## 2021-08-18 RX ADMIN — BUMETANIDE 2 MG: 0.25 INJECTION INTRAMUSCULAR; INTRAVENOUS at 00:52

## 2021-08-18 RX ADMIN — Medication 10 ML: at 11:33

## 2021-08-18 RX ADMIN — APIXABAN 5 MG: 5 TABLET, FILM COATED ORAL at 23:03

## 2021-08-18 RX ADMIN — IPRATROPIUM BROMIDE AND ALBUTEROL SULFATE 3 ML: 2.5; .5 SOLUTION RESPIRATORY (INHALATION) at 15:52

## 2021-08-18 RX ADMIN — IPRATROPIUM BROMIDE AND ALBUTEROL SULFATE 3 ML: 2.5; .5 SOLUTION RESPIRATORY (INHALATION) at 18:46

## 2021-08-18 RX ADMIN — INSULIN LISPRO 5 UNITS: 100 INJECTION, SOLUTION INTRAVENOUS; SUBCUTANEOUS at 18:17

## 2021-08-18 RX ADMIN — BUDESONIDE 0.5 MG: 0.5 SUSPENSION RESPIRATORY (INHALATION) at 18:47

## 2021-08-18 RX ADMIN — INSULIN LISPRO 3 UNITS: 100 INJECTION, SOLUTION INTRAVENOUS; SUBCUTANEOUS at 08:46

## 2021-08-18 RX ADMIN — APIXABAN 5 MG: 5 TABLET, FILM COATED ORAL at 08:50

## 2021-08-18 RX ADMIN — BUDESONIDE 0.5 MG: 0.5 SUSPENSION RESPIRATORY (INHALATION) at 08:15

## 2021-08-18 NOTE — PLAN OF CARE
Pt presents with functional mobility impairments which indicate the need for skilled intervention. Tolerating session today without incident. Pt on 10L hi-missael with 55%.  Pt required mod A for bed mobility, min/mod A x 2 for transfers and ambulated 5' x 4 with RW with min A.  O2 sats decreased to 85% once mobilizing to EOB but quickly rebounded with DB/PLB exercises.  O2 sats remained above 92% with ambulation.  Will continue to follow and progress as tolerated.

## 2021-08-18 NOTE — PROGRESS NOTES
Referring Provider: Maurilio Boucher,*    Reason for follow-up:  Atrial fibrillation  Cardiomyopathy  Shortness of breath  Bradycardia  Respiratory insufficiency-patient was transferred to ICU for BiPAP.    Patient Care Team:  Samantha Zabala APRN as PCP - General  Samantha Zabala APRN as PCP - Family Medicine  Jose G Rm MD as Consulting Physician (Cardiology)    Subjective .  Feeling much better after dialysis was started.    ROS  Since I have last seen, the patient has been without any chest discomfort ,shortness of breath, palpitations, dizziness or syncope.  Denies having any headache ,abdominal pain ,nausea, vomiting , diarrhea constipation, loss of weight or loss of appetite.  Denies having any excessive bruising ,hematuria or blood in the stool.    Review of all systems negative except as indicated.    Reviewed ROS.  History  Past Medical History:   Diagnosis Date   • A-fib (CMS/[facility]) 8/3/2021   • Appetite loss    • Arthritis    • Brain bleed (CMS/[facility])    • Carpal tunnel syndrome on left    • CHF (congestive heart failure) (CMS/[facility])    • Chronic left-sided low back pain without sciatica 12/27/2017   • CKD (chronic kidney disease) stage 3, GFR 30-59 ml/min (CMS/[facility])    • Closed fracture of seventh thoracic vertebra (CMS/[facility]) 8/23/2020   • Cognitive communication deficit 12/1/2020   • COVID-19 2/19/2021   • Cytokine release syndrome, grade 1 5/24/2021   • Depression    • Diabetic neuropathy (CMS/[facility])    • DM2 (diabetes mellitus, type 2) (CMS/[facility])    • Hyperlipidemia    • Hypogonadism in male    • Nonsustained ventricular tachycardia (CMS/[facility]) 7/23/2021   • Obesity    • Paroxysmal atrial fibrillation (CMS/[facility]) 12/1/2020   • Sleep apnea    • Stroke (CMS/[facility])    • Unsteady gait        Past Surgical History:   Procedure Laterality Date   • ANGIOPLASTY      X2   • APPENDECTOMY     • CARDIAC CATHETERIZATION  11/13/2015   • CARDIAC CATHETERIZATION N/A 5/24/2021    Procedure: Left Heart Cath and coronary  angiogram;  Surgeon: Jose G Rm MD;  Location:  [facility];  Service: Cardiovascular;  Laterality: N/A;   • CARDIAC CATHETERIZATION  5/24/2021    Procedure: Saphenous Vein Graft;  Surgeon: Jose G Rm MD;  Location:  [facility];  Service: Cardiovascular;;   • CARDIAC CATHETERIZATION N/A 5/24/2021    Procedure: Percutaneous Coronary Intervention;  Surgeon: Bernabe Zambrano MD;  Location:  [facility];  Service: Cardiology;  Laterality: N/A;   • CARDIAC CATHETERIZATION N/A 5/24/2021    Procedure: Stent NIELS coronary;  Surgeon: Bernabe Zambrano MD;  Location:  [facility];  Service: Cardiology;  Laterality: N/A;   • CARDIAC CATHETERIZATION N/A 5/26/2021    Procedure: Percutaneous Coronary Intervention;  Surgeon: Bernabe Zambrano MD;  Location:  [facility];  Service: Cardiovascular;  Laterality: N/A;   • CARDIAC CATHETERIZATION N/A 5/26/2021    Procedure: Stent NIELS bypass graft;  Surgeon: Bernabe Zambrano MD;  Location:  [facility];  Service: Cardiovascular;  Laterality: N/A;   • CARDIAC ELECTROPHYSIOLOGY PROCEDURE N/A 5/24/2021    Procedure: Temporary Pacemaker;  Surgeon: Bernabe Zambrano MD;  Location:  [facility];  Service: Cardiology;  Laterality: N/A;   • CARDIAC ELECTROPHYSIOLOGY PROCEDURE N/A 5/26/2021    Procedure: Temporary Pacemaker;  Surgeon: Bernabe Zambrano MD;  Location:  [facility];  Service: Cardiovascular;  Laterality: N/A;   • CARPAL TUNNEL RELEASE Left 04/29/2018    carpal tunnel- lt hand// other hand surgeries    • CATARACT EXTRACTION, BILATERAL  2002    Dr. Lux Acosta   • CHOLECYSTECTOMY     • COLON RESECTION  2005   • CORONARY ANGIOPLASTY     • CORONARY ANGIOPLASTY WITH STENT PLACEMENT  11/13/2015    PTCA stent to proximal in stent and mid to distal lad   • CORONARY ANGIOPLASTY WITH STENT PLACEMENT  09/16/2016    PTCA stent to mid lad and stent to vein graft  to marginal   • CORONARY ARTERY BYPASS GRAFT  2005    @ [facility]   • CYST REMOVAL      cyst removed from scrotum   • FOOT SURGERY Right 07/17/2018   • FOOT SURGERY Left 02/04/2019   • TOE AMPUTATION Right     right toe removed D/T infected cut that went to the bone       Family History   Problem Relation Age of Onset   • Heart disease Mother    • Heart disease Father    • Diabetes Sister    • Heart disease Sister    • Diabetes Brother    • Mental illness Brother        Social History     Tobacco Use   • Smoking status: Former Smoker     Packs/day: 1.00     Years: 60.00     Pack years: 60.00     Types: Cigarettes   • Smokeless tobacco: Never Used   • Tobacco comment: Patient quit smoking 11/2020   Vaping Use   • Vaping Use: Never used   Substance Use Topics   • Alcohol use: No   • Drug use: Yes     Frequency: 1.0 times per week     Types: Marijuana     Comment: socially        Medications Prior to Admission   Medication Sig Dispense Refill Last Dose   • albuterol sulfate  (90 Base) MCG/ACT inhaler Inhale 2 puffs Every 4 (Four) Hours As Needed.      • apixaban (ELIQUIS) 5 MG tablet tablet Take 5 mg by mouth Daily.      • aspirin 81 MG chewable tablet Chew 81 mg Daily.      • atorvastatin (LIPITOR) 40 MG tablet Take 40 mg by mouth Every Night.      • cholecalciferol (VITAMIN D3) 25 MCG (1000 UT) tablet Take 1,000 Units by mouth Daily.      • dextrose (GLUTOSE) 40 % gel Take  by mouth Every 1 (One) Hour As Needed for Low Blood Sugar.      • donepezil (ARICEPT) 10 MG tablet Take 10 mg by mouth Every Night.      • DULoxetine HCl (DRIZALMA) 60 MG capsule Take 60 mg by mouth Daily.      • gabapentin (NEURONTIN) 100 MG capsule Take 2 capsules by mouth 2 (two) times a day. 6 capsule 0    • hydrALAZINE (APRESOLINE) 50 MG tablet Take 50 mg by mouth 2 (two) times a day. Hold for SBP <110      • isosorbide mononitrate (IMDUR) 60 MG 24 hr tablet Take 60 mg by mouth 2 (Two) Times a Day.      • metoprolol succinate XL (TOPROL-XL)  100 MG 24 hr tablet Take 1 tablet by mouth Daily for 30 days. 30 tablet 0    • nitroglycerin (NITROSTAT) 0.4 MG SL tablet Place 1 tablet under the tongue Every 5 (Five) Minutes As Needed for Chest Pain (Only if SBP Greater Than 100). Take no more than 3 doses in 15 minutes. 30 tablet 12    • spironolactone (ALDACTONE) 25 MG tablet Take 1 tablet by mouth Daily for 30 days. 30 tablet 0    • tiotropium (Spiriva HandiHaler) 18 MCG per inhalation capsule Place 1 capsule into inhaler and inhale Daily. 30 capsule 1    • vitamin B-12 (CYANOCOBALAMIN) 1000 MCG tablet Take 1,000 mcg by mouth Daily.      • [DISCONTINUED] furosemide (LASIX) 40 MG tablet Take 1 tablet by mouth 2 (Two) Times a Day for 30 days. 60 tablet 0        Allergies  Codeine    Scheduled Meds:amiodarone, 200 mg, Oral, Q12H  apixaban, 5 mg, Oral, Q12H  aspirin, 81 mg, Oral, Daily  atorvastatin, 40 mg, Oral, Nightly  budesonide, 0.5 mg, Nebulization, BID - RT  bumetanide, 2 mg, Intravenous, Q12H  carvedilol, 6.25 mg, Oral, BID With Meals  cholecalciferol, 1,000 Units, Oral, Daily  dilTIAZem CD, 240 mg, Oral, Q24H  donepezil, 10 mg, Oral, Nightly  DULoxetine, 60 mg, Oral, Daily  epoetin izabella-epbx, 20,000 Units, Subcutaneous, Weekly  hydrALAZINE, 50 mg, Oral, Q12H  insulin lispro, 0-14 Units, Subcutaneous, TID AC  ipratropium-albuterol, 3 mL, Nebulization, Q4H - RT  isosorbide mononitrate, 60 mg, Oral, BID - Nitrates  metOLazone, 5 mg, Oral, Daily  sodium chloride, 10 mL, Intravenous, Q12H  vitamin B-12, 1,000 mcg, Oral, Daily      Continuous Infusions:   PRN Meds:.•  acetaminophen **OR** acetaminophen **OR** acetaminophen  •  aluminum-magnesium hydroxide-simethicone  •  dextrose  •  dextrose  •  glucagon (human recombinant)  •  heparin (porcine)  •  insulin lispro **AND** insulin lispro  •  melatonin  •  nitroglycerin  •  ondansetron **OR** ondansetron  •  [COMPLETED] Insert peripheral IV **AND** sodium chloride  •  sodium chloride    Objective     VITAL  "SIGNS  Vitals:    08/18/21 0258 08/18/21 0300 08/18/21 0500 08/18/21 0640   BP:  114/73 119/73 108/67   BP Location:    Left arm   Patient Position:    Lying   Pulse: 107 109 97 120   Resp: 22   15   Temp:    98.3 °F (36.8 °C)   TempSrc:    Oral   SpO2: 98% 97%  97%   Weight:    108 kg (238 lb 12.1 oz)   Height:           Flowsheet Rows      First Filed Value   Admission Height  180.3 cm (71\") Documented at 08/03/2021 1733   Admission Weight  113 kg (250 lb) Documented at 08/03/2021 1733            Intake/Output Summary (Last 24 hours) at 8/18/2021 0651  Last data filed at 8/18/2021 0640  Gross per 24 hour   Intake 440 ml   Output 800 ml   Net -360 ml        TELEMETRY: Atrial fibrillation with moderate ventricular response.    Physical Exam:  The patient is awake and alert and not in distress.  Vital signs as noted above.  Exogenous obesity.  Head and neck revealed no carotid bruits or jugular venous distention.  No thyromegaly or lymphadenopathy is present  Lungs clear.  No wheezing.  Breath sounds are normal bilaterally.  Heart normal first and second heart sounds.  No murmur. No precordial rub is present.  No gallop is present.  Abdomen soft and nontender.  No organomegaly is present.  Extremities with good peripheral pulses without any pedal edema.  Skin warm and dry.  Musculoskeletal system is grossly normal  CNS-grossly normal.  Results Review:   I reviewed the patient's new clinical results.  Lab Results (last 24 hours)     Procedure Component Value Units Date/Time    CBC & Differential [[id number]]  (Abnormal) Collected: 08/18/21 0607    Specimen: Blood Updated: 08/18/21 0642    Narrative:      The following orders were created for panel order CBC & Differential.  Procedure                               Abnormality         Status                     ---------                               -----------         ------                     CBC Auto Differential[[id number]]        Abnormal            Final result    "              Please view results for these tests on the individual orders.    CBC Auto Differential [[id number]]  (Abnormal) Collected: 08/18/21 0607    Specimen: Blood Updated: 08/18/21 0642     WBC 10.00 10*3/mm3      RBC 3.00 10*6/mm3      Hemoglobin 8.6 g/dL      Hematocrit 26.0 %      MCV 86.5 fL      MCH 28.5 pg      MCHC 33.0 g/dL      RDW 16.2 %      RDW-SD 49.4 fl      MPV 7.6 fL      Platelets 383 10*3/mm3      Neutrophil % 62.2 %      Lymphocyte % 20.0 %      Monocyte % 9.8 %      Eosinophil % 7.5 %      Basophil % 0.5 %      Neutrophils, Absolute 6.20 10*3/mm3      Lymphocytes, Absolute 2.00 10*3/mm3      Monocytes, Absolute 1.00 10*3/mm3      Eosinophils, Absolute 0.80 10*3/mm3      Basophils, Absolute 0.00 10*3/mm3      nRBC 0.1 /100 WBC     Renal Function Panel [[id number]] Collected: 08/18/21 0607    Specimen: Blood Updated: 08/18/21 0639    Magnesium [[id number]] Collected: 08/18/21 0607    Specimen: Blood Updated: 08/18/21 0639    POC Glucose Once [[id number]]  (Abnormal) Collected: 08/17/21 2041    Specimen: Blood Updated: 08/17/21 2043     Glucose 269 mg/dL      Comment: Serial Number: [id number]:  [id number]       POC Glucose Once [[id number]]  (Abnormal) Collected: 08/17/21 1606    Specimen: Blood Updated: 08/17/21 1607     Glucose 210 mg/dL      Comment: Serial Number: [id number]:  [id number]       POC Glucose Once [[id number]]  (Abnormal) Collected: 08/17/21 1106    Specimen: Blood Updated: 08/17/21 1108     Glucose 300 mg/dL      Comment: Serial Number: [id number]:  [id number]             Imaging Results (Last 24 Hours)     ** No results found for the last 24 hours. **      LAB RESULTS (LAST 7 DAYS)    CBC  Results from last 7 days   Lab Units 08/18/21  0607 08/17/21  0538 08/16/21  1120 08/16/21  0436 08/14/21  0440 08/14/21  0432 08/14/21  0412 08/13/21  0321 08/13/21  0321   WBC 10*3/mm3 10.00 7.60 7.20 7.10  --   --  10.30  --  7.70   RBC 10*6/mm3 3.00* 2.73* 2.66* 2.26*  --    --  2.79*  --  2.54*   HEMOGLOBIN g/dL 8.6* 7.7* 7.6*  7.6* 6.6*  --   --  8.1*   < > 7.5*   HEMOGLOBIN, POC g/dL  --   --   --   --  10.7* 8.3*  --   --   --    HEMATOCRIT % 26.0* 23.6* 22.9*  22.9* 19.5*  --   --  24.3*   < > 22.1*   HEMATOCRIT POC %  --   --   --   --  31* 25*  --   --   --    MCV fL 86.5 86.3 86.2 86.4  --   --  86.9  --  87.1   PLATELETS 10*3/mm3 383 314 305 301  --   --  309  --  279    < > = values in this interval not displayed.       BMP  Results from last 7 days   Lab Units 08/17/21  0538 08/16/21  0308 08/15/21  0447 08/14/21  0800 08/14/21  0412 08/13/21  0734 08/13/21  0321 08/12/21  0432 08/12/21  0432 08/11/21  1253 08/11/21  0745   SODIUM mmol/L 134* 136 133*  --  134*  --  133*  --  132*  --  136   POTASSIUM mmol/L 3.2* 3.5 3.4* 4.3 4.5 4.3 4.4  4.4   < > 4.7  4.7   < > 5.4*  5.4*   CHLORIDE mmol/L 97* 93* 91*  --  88*  --  89*  --  89*  --  92*   CO2 mmol/L 28.0 32.0* 32.0*  --  33.0*  --  33.0*  --  33.0*  --  34.0*   BUN mg/dL 35* 57* 45*  --  87*  --  82*  --  77*  --  72*   CREATININE mg/dL 1.77* 2.45* 1.70*  --  2.73*  --  2.95*  --  2.93*  --  2.81*   GLUCOSE mg/dL 156* 167* 149*  --  182*  --  162*  --  161*  --  183*   MAGNESIUM mg/dL  --   --   --   --  2.2  --  2.2  --   --   --   --    PHOSPHORUS mg/dL 2.0* 3.5 2.7  --  5.4*  --  5.3*  --  5.3*  --  5.7*    < > = values in this interval not displayed.       CMP   Results from last 7 days   Lab Units 08/17/21  0538 08/16/21  0308 08/15/21  0447 08/14/21  0800 08/14/21  0412 08/13/21  0734 08/13/21  0321 08/12/21  0432 08/12/21  0432 08/11/21  1253 08/11/21  0745   SODIUM mmol/L 134* 136 133*  --  134*  --  133*  --  132*  --  136   POTASSIUM mmol/L 3.2* 3.5 3.4* 4.3 4.5 4.3 4.4  4.4   < > 4.7  4.7   < > 5.4*  5.4*   CHLORIDE mmol/L 97* 93* 91*  --  88*  --  89*  --  89*  --  92*   CO2 mmol/L 28.0 32.0* 32.0*  --  33.0*  --  33.0*  --  33.0*  --  34.0*   BUN mg/dL 35* 57* 45*  --  87*  --  82*  --  77*  --  72*    CREATININE mg/dL 1.77* 2.45* 1.70*  --  2.73*  --  2.95*  --  2.93*  --  2.81*   GLUCOSE mg/dL 156* 167* 149*  --  182*  --  162*  --  161*  --  183*   ALBUMIN g/dL 3.10* 3.20* 3.50  --  3.80  --  3.60  --  3.60  --  3.70    < > = values in this interval not displayed.         BNP        TROPONIN        CoAg  Results from last 7 days   Lab Units 08/14/21  0441   INR  1.17*   APTT seconds 33.4*       Creatinine Clearance  Estimated Creatinine Clearance: 45.5 mL/min (A) (by C-G formula based on SCr of 1.77 mg/dL (H)).    ABG  Results from last 7 days   Lab Units 08/14/21  1641 08/14/21  0626 08/14/21  0440 08/14/21  0432 08/11/21  0833   PH, ARTERIAL pH units 7.444 7.294* 7.266* 7.270* 7.361   PCO2, ARTERIAL mm Hg 49.7* 72.5* 75.5* 78.3* 55.7*   PO2 ART mm Hg 173.2* 193.8* 97.8 50.4* 42.8*   O2 SATURATION ART % 99.6* 99.5* 96.0 77.7* 75.2*   BASE EXCESS ART mmol/L 9.0* 7.2* 5.5* 7.6* 5.4*       Radiology  XR Chest 1 View    Result Date: 8/17/2021   1. Continued findings of congestive heart failure and interstitial pulmonary edema. 2. Bilateral dependent pleural effusions. Bibasilar airspace disease.  Electronically Signed By-Oscar Casper MD On:8/17/2021 7:24 AM This report was finalized on [id number] by  Oscar Casper MD.              EKG              I personally viewed and interpreted the patient's EKG/Telemetry data: Atrial fibrillation  ECHOCARDIOGRAM:    Results for orders placed during the hospital encounter of 07/20/21    Adult Transthoracic Echo Complete With Contrast if Necessary Per Protocol    Interpretation Summary  · Estimated left ventricular EF = 30% Left ventricular systolic function is moderately decreased.    Occasions  Shortness of breath.    Technically satisfactory study.  Mitral valve is structurally normal.  Mild mitral regurgitation.  Tricuspid valve is structurally normal.  Aortic valve is structurally normal.  Pulmonic valve could not be well visualized.  No evidence for tricuspid or  aortic regurgitation is seen by Doppler study.  Biatrial and biventricular enlargement is present.  Diffuse left ventricular hypocontractility with ejection fraction of 30%.  Atrial septum is intact.  Aorta is normal.  No pericardial effusion or intracardiac thrombus is seen.    Impression  Mild mitral regurgitation.  Significant biatrial and biventricular enlargement.  Diffuse left ventricular hypocontractility with ejection fraction of 30%.  No pericardial effusion or intracardiac thrombus is seen.          STRESS MYOVIEW:    Cardiolite (Tc-99m Sestamibi) stress test    CARDIAC CATHETERIZATION:            OTHER:         Assessment/Plan     Principal Problem:    Acute on chronic systolic heart failure (CMS/HCC)  Active Problems:    S/P CABG (coronary artery bypass graft)    Essential hypertension    Elevated troponin    ANNETTE treated with BiPAP    Major depressive disorder, recurrent, mild (CMS/HCC)    Mixed hyperlipidemia    History of cerebrovascular accident    Flaccid hemiplegia of right dominant side as late effect of cerebral infarction (CMS/HCC)    Diabetic neuropathy (CMS/HCC)    Unspecified dementia with behavioral disturbance (CMS/HCC)    Coronary artery disease    Obesity    Vitamin D deficiency    Chronic venous hypertension (idiopathic) with ulcer and inflammation of bilateral lower extremity (CMS/HCC)    Chronic obstructive pulmonary disease, unspecified (CMS/HCC)    Chronic foot ulcer (CMS/HCC)    Paroxysmal atrial fibrillation (CMS/HCC)    Acute kidney injury (CMS/HCC)    Type 2 diabetes mellitus with hyperglycemia (CMS/HCC)    CKD (chronic kidney disease) stage 3, GFR 30-59 ml/min (CMS/HCC)    Anemia of renal disease    Acute respiratory failure with hypoxia and hypercapnia (CMS/HCC)      [[[[[[[[[[[[[[[[[[[[[[[[[    Impression  ==============  -Increased shortness of breath and palpitations     -Recent acute on chronic congestive heart failure.  Compensated at this time  Recent echocardiogram showed  left ventricle dysfunction with ejection fraction of 35%.     -History of right-sided weakness with left MCA territory stroke.-Improved      -status post CABG 2005. status post stent to LAD 2009    Status post stent to LAD 11/13/2015  Status post stent to mid LAD and SVG to marginal branch 09/16/2016.  Status post stent to mid LAD 5/24/2021  Status post stent to SVG to RCA 5/26/2021     Cardiac catheterization 5/24/2021 revealed  Left ventricle angiogram was not performed due to pre-existing renal dysfunction.  Left main coronary artery normal.  Left anterior descending artery has mid segment lengthy 90% disease within the previously placed stent.  Distal left into descending artery has diffuse disease.  Circumflex coronary artery is totally occluded.  (Chronic)  Right coronary artery is chronically occluded.  SVG to marginal branch (jump graft to OM1 and OM 2) is totally occluded (new finding compared to 2015.  SVG to PDA has distal radiolucency.  However no definite obstructive disease is present.       -status post myocardial infarction 2000 and 2002 .      -history of intermittent atrial fibrillation-maintaining sinus rhythm     Transesophageal echocardiogram 11/30/2020.  Biatrial enlargement.  Smoking effect in the left atrium and left ventricle and left atrial appendage.  Mild mitral and aortic regurgitation.  Left ventricle enlargement with diffuse hypocontractility with ejection fraction of 35 to 40%.     Echocardiogram 11/27/2020 revealed left atrial enlargement left ventricle dysfunction with ejection fraction of 40%.  Negative bubble study.      -diabetes hypertension and sleep apnea in history of renal dysfunction .  Recent BUN/creatinine 38/1.36     -status post colon surgery (partial colectomy) appendectomy cholecystectomy right 4th toe removal and carpal tunnel surgery      -continued smoking 1 pack per day -abstinence from smoking      -allergy to  codeine.  ============   Plan  ================  Atrial fibrillation-chronic  Rate is faster today.  Increase Coreg to 12.5 mg p.o. twice a day.  Continue amiodarone and Cardizem CD.  Observe patient's blood pressure.    Respiratory insufficiency.-Improved    Significant renal dysfunction-chronic  Patient is on dialysis now.  Likely long-term  Patient is feeling much better after dialysis was started.    Anemia  Hemoglobin 6.9.  Hemoglobin 7.7-8/17/2021  8.6-8/18/2021    Status post CABG  Patient is not having any angina pectoris    Status post stent to LAD 5/24/2021.  Status post stent to SVG to RCA 5/26/2021.       Anticoagulation  Patient is on Eliquis.  Observe for toxic effects.    Further plan will depend on patient's progress.   ]]]]]]]]]]]]]]]]]]]]]]           Jose G Rm MD  08/18/21  06:51 EDT

## 2021-08-18 NOTE — NURSING NOTE
[facility] dispatch notified that patient needs dialysis at bedside. Unable to come to acute room due to patient being on precision flow per primary nurse.

## 2021-08-18 NOTE — THERAPY TREATMENT NOTE
Subjective: Pt agreeable to therapeutic plan of care.    Objective:     Bed mobility - Mod-A  Transfers - Min-A, Mod-A and Assist x 2 RW  Ambulation - 5' x4 feet Min-A and with rolling walker  Limited by hi-missael O2 tubing    Pain: 0 VAS  Education: Provided education on importance of mobility and skilled verbal / tactile cueing throughout intervention.     Assessment: Pt presents with functional mobility impairments which indicate the need for skilled intervention. Tolerating session today without incident. Pt on 10L hi-missael with 55%.  Pt required mod A for bed mobility, min/mod A x 2 for transfers and ambulated 5' x 4 with RW with min A.  O2 sats decreased to 85% once mobilizing to EOB but quickly rebounded with DB/PLB exercises.  O2 sats remained above 92% with ambulation.  Will continue to follow and progress as tolerated.     Plan/Recommendations:   Pt would benefit from Inpatient Rehabilitation placement at discharge from facility and requires rolling walker at discharge.   Pt desires Inpatient Rehabilitation placement at discharge. Pt cooperative; agreeable to therapeutic recommendations and plan of care.     Basic Mobility 6-click:  Rollin = Total, A lot = 2, A little = 3; 4 = None  Supine>Sit:   1 = Total, A lot = 2, A little = 3; 4 = None   Sit>Stand with arms:  1 = Total, A lot = 2, A little = 3; 4 = None  Bed>Chair:   1 = Total, A lot = 2, A little = 3; 4 = None  Ambulate in room:  1 = Total, A lot = 2, A little = 3; 4 = None  3-5 Steps with railin = Total, A lot = 2, A little = 3; 4 = None  Score: 12        Post-Tx Position: Up in Chair, Alarms activated and Call light and personal items within reach  PPE: gloves, surgical mask, eyewear protection

## 2021-08-18 NOTE — PROGRESS NOTES
[facility] Medicine Services Daily Progress Note    Patient Name: Benson Mclean  : 1950  MRN: [id number]  Primary Care Physician:  Samantha Zabala APRN  Date of admission: 8/3/2021      Subjective      Chief Complaint: Shortness of breath.      Patient Reports     21: s/p HD yesterday.  Feels better.  On 65% FiO2.  Informed the patient that he is getting decreased insulin dose to minimize hypoglycemia.  21: Feels better.  Planned HD today.  On 50% FiO2.    Review of Systems   All other systems reviewed and are negative.         Objective      Vitals:   Temp:  [98.1 °F (36.7 °C)-98.7 °F (37.1 °C)] 98.7 °F (37.1 °C)  Heart Rate:  [] 119  Resp:  [10-22] 10  BP: ()/(46-73) 113/56  Flow (L/min):  [10-25] 10    Physical Exam  Constitutional:       General: He is not in acute distress.  HENT:      Head: Normocephalic and atraumatic.      Nose: Nose normal.   Eyes:      General: No scleral icterus.     Extraocular Movements: Extraocular movements intact.      Pupils: Pupils are equal, round, and reactive to light.   Cardiovascular:      Rate and Rhythm: Normal rate and regular rhythm.   Pulmonary:      Effort: Pulmonary effort is normal.      Breath sounds: Examination of the right-lower field reveals rales. Rales present.   Abdominal:      General: Bowel sounds are normal.      Comments: Obese abdomen.   Musculoskeletal:      Cervical back: Normal range of motion.      Comments: Decreased range of motion hips   Lymphadenopathy:      Cervical: No cervical adenopathy.   Skin:     General: Skin is warm.      Findings: No rash.   Neurological:      Mental Status: He is alert.      Cranial Nerves: Cranial nerves are intact.   Psychiatric:         Mood and Affect: Mood normal.           Result Review    Result Review:  I have personally reviewed the results from the time of this admission to 2021 14:14 EDT and agree with these findings:  [x]  Laboratory  []   Microbiology  []  Radiology  []  EKG/Telemetry   [x]  Cardiology/Vascular   []  Pathology  [x]  Old records  []  Other:            Assessment/Plan      Brief Patient Summary:    70 years old male admitted for A. fib RVR and eventual transfer to ICU for worsening hypoxemia and eventual initiation on hemodialysis for worsening DILEEP.       amiodarone, 200 mg, Oral, Q12H  apixaban, 5 mg, Oral, Q12H  aspirin, 81 mg, Oral, Daily  atorvastatin, 40 mg, Oral, Nightly  budesonide, 0.5 mg, Nebulization, BID - RT  bumetanide, 2 mg, Intravenous, Q12H  carvedilol, 12.5 mg, Oral, Q12H  cholecalciferol, 1,000 Units, Oral, Daily  dilTIAZem CD, 240 mg, Oral, Q24H  donepezil, 10 mg, Oral, Nightly  DULoxetine, 60 mg, Oral, Daily  epoetin izabella-epbx, 20,000 Units, Subcutaneous, Weekly  insulin lispro, 0-14 Units, Subcutaneous, TID AC  ipratropium-albuterol, 3 mL, Nebulization, Q4H - RT  isosorbide mononitrate, 60 mg, Oral, BID - Nitrates  metOLazone, 5 mg, Oral, Daily  sodium chloride, 10 mL, Intravenous, Q12H  vitamin B-12, 1,000 mcg, Oral, Daily             Active Hospital Problems:  Active Hospital Problems    Diagnosis    • **Acute on chronic systolic heart failure (CMS/HCC)    • Essential hypertension    • S/P CABG (coronary artery bypass graft)    • Acute respiratory failure with hypoxia and hypercapnia (CMS/[facility])    • Anemia of renal disease    • Acute kidney injury (CMS/[facility])    • CKD (chronic kidney disease) stage 3, GFR 30-59 ml/min (CMS/[facility])    • Type 2 diabetes mellitus with hyperglycemia (CMS/[facility])    • Mixed hyperlipidemia    • Obesity    • History of cerebrovascular accident    • Chronic obstructive pulmonary disease, unspecified (CMS/[facility])    • Flaccid hemiplegia of right dominant side as late effect of cerebral infarction (CMS/[facility])    • Unspecified dementia with behavioral disturbance (CMS/[facility])    • Major depressive disorder, recurrent, mild (CMS/[facility])    • Paroxysmal atrial fibrillation (CMS/[facility])    • Elevated troponin    •  Chronic venous hypertension (idiopathic) with ulcer and inflammation of bilateral lower extremity (CMS/HCC)    • Chronic foot ulcer (CMS/HCC)    • Coronary artery disease    • Vitamin D deficiency    • ANNETTE treated with BiPAP    • Diabetic neuropathy (CMS/HCC)           Acute on chronic systolic heart failure   -Improved with hemodialysis and diuretics  -on diuretics, BB, CCB, and Imdur   -ACE-I/ARB contraindicated d/t DILEEP     Acute respiratory failure with hypoxia and hypercapnia: Improving  -secondary to fluid overload and reason for transfer to ICU on 8/14/2020  -Continue precision flow  -titrate O2 to keep sat >90%  -AVAPS PRN  -pulmonary following     Acute kidney injury, CKD (chronic kidney disease) stage 3, (POA)  -nephrology following  -Right IJ Shiley inserted and hemodialysis initiated on 08/14/21  -s/p HD 8/16        Elevated troponin:  -Denies chest pain  -likely demand ischemia  -cardiology following     Paroxysmal atrial fibrillation with RVR (POA)  -rate controlled  -continue amiodarone, diltiazem CD, and Eliquis  -Cardiology following     Coronary artery disease, S/P CABG / Essential hypertension, chronic / Mixed hyperlipidemia  -continue aspirin, Eliquis, statin, Bumex, carvedilol, diltiazem CD, hydralazine, Imdur, and metolazone  -monitor BP     ANNETTE:  -Claims to be compliant with CPAP at home  -currently using hospital BiPAP     Major depressive disorder:  -continue Cymbalta      History of cerebrovascular accident  -Flaccid hemiplegia of right dominant side as late effect of cerebral infarction   -fall precautions  -continue aspirin, Elqius, and statin      Type 2 diabetes mellitus with hyperglycemia complicated with Diabetic neuropathy   -A1c 7.4% on 07/21/21  -Continue ISS     dementia with behavioral disturbance  -continue donepezil      Obesity  -BMI 37.39   -encourage lifestyle modifications      Vitamin D deficiency  -continue cholecalciferol      Chronic obstructive pulmonary disease,  unspecified, former smoker  -stable without exacerbation  -continue bronchodilators      Anemia of chronic disease   -on weekly Retacrit   -Monitor H&H    DVT prophylaxis:  Medical DVT prophylaxis orders are present.    CODE STATUS:    Code Status: CPR  Medical Interventions (Level of Support Prior to Arrest): Full      Disposition:  I expect patient to be discharged rehab.    This patient has been examined wearing appropriate Personal Protective Equipment and discussed with nursing. 08/18/21      Electronically signed by Maurilio Boucher DO, 08/18/21, 14:14 EDT.  [facility] Hospitalist Team

## 2021-08-18 NOTE — PROGRESS NOTES
"RENAL/KCC:     LOS: 14 days    Patient Care Team:  Samantha Zabala APRN as PCP - General  aSmantha Zabala APRN as PCP - Family Medicine  Jose G Rm MD as Consulting Physician (Cardiology)    Chief Complaint:  Palpitations    Subjective     Interval History:   Out of the ICU, for HD today.    Objective     Vital Sign Min/Max for last 24 hours  Temp  Min: 98.1 °F (36.7 °C)  Max: 98.5 °F (36.9 °C)   BP  Min: 87/51  Max: 129/71   Pulse  Min: 61  Max: 121   Resp  Min: 12  Max: 22   SpO2  Min: 84 %  Max: 100 %   Flow (L/min)  Min: 15  Max: 25   Weight  Min: 108 kg (238 lb 12.1 oz)  Max: 108 kg (238 lb 12.1 oz)     Flowsheet Rows      First Filed Value   Admission Height  180.3 cm (71\") Documented at 08/03/2021 1733   Admission Weight  113 kg (250 lb) Documented at 08/03/2021 1733          I/O this shift:  In: -   Out: 300 [Urine:300]  I/O last 3 completed shifts:  In: 560 [P.O.:560]  Out: 5100 [Urine:1100; Other:4000]    Physical Exam:  GEN: Awake, NAD  ENT: PERRL, EOMI, MMM  NECK: Supple, no JVD  CHEST: CTAB, no W/R/C  CV: RRR, no M/G/R  ABD: Soft, NT, +BS  SKIN: Warm and Dry  NEURO: CN's intact      WBC WBC   Date Value Ref Range Status   08/18/2021 10.00 3.40 - 10.80 10*3/mm3 Final   08/17/2021 7.60 3.40 - 10.80 10*3/mm3 Final   08/16/2021 7.20 3.40 - 10.80 10*3/mm3 Final   08/16/2021 7.10 3.40 - 10.80 10*3/mm3 Final      HGB Hemoglobin   Date Value Ref Range Status   08/18/2021 8.6 (L) 13.0 - 17.7 g/dL Final   08/17/2021 7.7 (L) 13.0 - 17.7 g/dL Final   08/16/2021 7.6 (L) 13.0 - 17.7 g/dL Final   08/16/2021 7.6 (L) 13.0 - 17.7 g/dL Final   08/16/2021 6.6 (C) 13.0 - 17.7 g/dL Final      HCT Hematocrit   Date Value Ref Range Status   08/18/2021 26.0 (L) 37.5 - 51.0 % Final   08/17/2021 23.6 (L) 37.5 - 51.0 % Final   08/16/2021 22.9 (L) 37.5 - 51.0 % Final   08/16/2021 22.9 (L) 37.5 - 51.0 % Final   08/16/2021 19.5 (C) 37.5 - 51.0 % Final      Platlets No results found for: LABPLAT   MCV MCV   Date Value Ref Range " Status   08/18/2021 86.5 79.0 - 97.0 fL Final   08/17/2021 86.3 79.0 - 97.0 fL Final   08/16/2021 86.2 79.0 - 97.0 fL Final   08/16/2021 86.4 79.0 - 97.0 fL Final          Sodium Sodium   Date Value Ref Range Status   08/18/2021 137 136 - 145 mmol/L Final   08/17/2021 134 (L) 136 - 145 mmol/L Final   08/16/2021 136 136 - 145 mmol/L Final      Potassium Potassium   Date Value Ref Range Status   08/18/2021 4.3 3.5 - 5.2 mmol/L Final     Comment:     Result checked    08/17/2021 3.2 (L) 3.5 - 5.2 mmol/L Final   08/16/2021 3.5 3.5 - 5.2 mmol/L Final      Chloride Chloride   Date Value Ref Range Status   08/18/2021 97 (L) 98 - 107 mmol/L Final   08/17/2021 97 (L) 98 - 107 mmol/L Final   08/16/2021 93 (L) 98 - 107 mmol/L Final      CO2 CO2   Date Value Ref Range Status   08/18/2021 28.0 22.0 - 29.0 mmol/L Final   08/17/2021 28.0 22.0 - 29.0 mmol/L Final   08/16/2021 32.0 (H) 22.0 - 29.0 mmol/L Final      BUN BUN   Date Value Ref Range Status   08/18/2021 49 (H) 8 - 23 mg/dL Final   08/17/2021 35 (H) 8 - 23 mg/dL Final   08/16/2021 57 (H) 8 - 23 mg/dL Final      Creatinine Creatinine   Date Value Ref Range Status   08/18/2021 2.48 (H) 0.76 - 1.27 mg/dL Final   08/17/2021 1.77 (H) 0.76 - 1.27 mg/dL Final   08/16/2021 2.45 (H) 0.76 - 1.27 mg/dL Final      Calcium Calcium   Date Value Ref Range Status   08/18/2021 8.6 8.6 - 10.5 mg/dL Final   08/17/2021 7.4 (L) 8.6 - 10.5 mg/dL Final   08/16/2021 8.4 (L) 8.6 - 10.5 mg/dL Final      PO4 No results found for: CAPO4   Albumin Albumin   Date Value Ref Range Status   08/18/2021 3.40 (L) 3.50 - 5.20 g/dL Final   08/17/2021 3.10 (L) 3.50 - 5.20 g/dL Final   08/16/2021 3.20 (L) 3.50 - 5.20 g/dL Final      Magnesium Magnesium   Date Value Ref Range Status   08/18/2021 1.9 1.6 - 2.4 mg/dL Final      Uric Acid No results found for: URICACID        Results Review:     I reviewed the patient's new clinical results.    amiodarone, 200 mg, Oral, Q12H  apixaban, 5 mg, Oral, Q12H  aspirin, 81  mg, Oral, Daily  atorvastatin, 40 mg, Oral, Nightly  budesonide, 0.5 mg, Nebulization, BID - RT  bumetanide, 2 mg, Intravenous, Q12H  carvedilol, 6.25 mg, Oral, BID With Meals  cholecalciferol, 1,000 Units, Oral, Daily  dilTIAZem CD, 240 mg, Oral, Q24H  donepezil, 10 mg, Oral, Nightly  DULoxetine, 60 mg, Oral, Daily  epoetin izabella-epbx, 20,000 Units, Subcutaneous, Weekly  insulin lispro, 0-14 Units, Subcutaneous, TID AC  ipratropium-albuterol, 3 mL, Nebulization, Q4H - RT  isosorbide mononitrate, 60 mg, Oral, BID - Nitrates  metOLazone, 5 mg, Oral, Daily  sodium chloride, 10 mL, Intravenous, Q12H  vitamin B-12, 1,000 mcg, Oral, Daily           Medication Review: Reviewed    Assessment/Plan       Acute on chronic systolic heart failure (CMS/HCC)    S/P CABG (coronary artery bypass graft)    Essential hypertension    Elevated troponin    ANNETTE treated with BiPAP    Major depressive disorder, recurrent, mild (CMS/[facility])    Mixed hyperlipidemia    History of cerebrovascular accident    Flaccid hemiplegia of right dominant side as late effect of cerebral infarction (CMS/[facility])    Diabetic neuropathy (CMS/[facility])    Unspecified dementia with behavioral disturbance (CMS/[facility])    Coronary artery disease    Obesity    Vitamin D deficiency    Chronic venous hypertension (idiopathic) with ulcer and inflammation of bilateral lower extremity (CMS/HCC)    Chronic obstructive pulmonary disease, unspecified (CMS/[facility])    Chronic foot ulcer (CMS/[facility])    Paroxysmal atrial fibrillation (CMS/[facility])    Acute kidney injury (CMS/[facility])    Type 2 diabetes mellitus with hyperglycemia (CMS/[facility])    CKD (chronic kidney disease) stage 3, GFR 30-59 ml/min (CMS/[facility])    Anemia of renal disease    Acute respiratory failure with hypoxia and hypercapnia (CMS/HCC)      1. DILEEP  2. CKD III  3. Hyperkalemia  4. Pulmonary edema  5. Tachycardia  6. CHF  7. T2DM  8. Hypertension  9. Anemia     PLAN:  HD again today.  Will plan permcath later this week.  Social work to look  for outpatient HD spot for acute renal failure.  Will reassess in AM.         Yony Bhat M.D.  [facility]

## 2021-08-18 NOTE — CASE MANAGEMENT/SOCIAL WORK
Continued Stay Note  [facility]     Patient Name: Benson Mclean  MRN: [id number]  Today's Date: 8/18/2021    Admit Date: 8/3/2021    Discharge Plan     Row Name 08/18/21 1219       Plan    Plan  D/C Plan: Return to [facility]. Will require precert. No PASRR required. [facility] OP HD referral started 8/18-pending chair time.    Plan Comments  CM notified by Dr Bhat via consult that patient needs OP HD set up at [facility]. If [facility] unable to accept, [facility] referral can be made. CM submitted referral via [facility] portal, awaiting chair time. CM sent message to [facility] liaison to confirm patient's COVID vaccine dates-awaiting reply. Secure chat to Dr Bhat requesting Hep B core antibody and surface antibody. Barrier to D/C: 20L HF O2, IV bumex, HD today.        Expected Discharge Date and Time     Expected Discharge Date Expected Discharge Time    Aug 23, 2021             Christine Dickerson

## 2021-08-18 NOTE — DISCHARGE PLACEMENT REQUEST
"Benson Mclean (70 y.o. Male)     Date of Birth Social Security Number Address Home Phone MRN    1950  [address] [place] [address] [phone] [id number]    Yazidi Marital Status          Advent        Admission Date Admission Type Admitting Provider Attending Provider Department, Room/Bed    8/3/21 Emergency Draw, MD Citlaly Ortiz Aman I, DO [facility] NEURO HEART, 262/1    Discharge Date Discharge Disposition Discharge Destination                       Attending Provider: Maurilio Boucher DO    Allergies: Codeine    Isolation: None   Infection: None   Code Status: CPR    Ht: 170.2 cm (67.01\")   Wt: 108 kg (238 lb 12.1 oz)    Admission Cmt: None   Principal Problem: Acute on chronic systolic heart failure (CMS/HCC) [I50.23]                 Active Insurance as of 8/3/2021     Primary Coverage     Payor Plan Insurance Group Employer/Plan Group    HUMANA MEDICARE REPLACEMENT HUMANA MEDICARE REPLACEMENT [id number]     Payor Plan Address Payor Plan Phone Number Payor Plan Fax Number Effective Dates    [address] [address] [phone]  1/1/2018 - None Entered    [facility] [address]       Subscriber Name Subscriber Birth Date Member ID       BENSON MCLEAN 1950 [id number]           Secondary Coverage     Payor Plan Insurance Group Employer/Plan Group    [place]      Payor Plan Address Payor Plan Phone Number Payor Plan Fax Number Effective Dates    [address]   5/1/2021 - None Entered    [place] [address]       Subscriber Name Subscriber Birth Date Member ID       BENSON MCLEAN 1950 [id number]                 Emergency Contacts      (Rel.) Home Phone Work Phone Mobile Phone    SHAUNA MCLEAN (Spouse) [phone] -- [phone]               History & Physical      Rayne Bah APRN at 08/03/21 2115     Attestation signed by Stanton Junior MD at 08/04/21 1539        Agree with above mentioned except " my evaluation, assessment, plan and treatment supercedes that of ARNP or PA.        Electronically signed by Stanton Junior MD, 21, 3:39 PM EDT.                        [facility] Medicine Services      Patient Name: Benson Mclean  : 1950  MRN: [id number]  Primary Care Physician:  Samantha Zabala APRN  Date of admission: 8/3/2021      Subjective      Chief Complaint: Tachycardia    History of Present Illness:   Benson Mclean is a 70 y.o. male w/PMH of CHF, CKD, DM, HTN, HLD, CAD, COPD, A. fib, CABG, ANNETTE, CVA, neuropathy, obesity, depression, COVID-19 who presents to [facility] ED with palpitations, patient was discharged from this facility yesterday after a 12-day admission.  Patient states tachycardia progressively worsened after discharge from this facility yesterday, no aggravating or alleviating factors noted.  Patient admits to chest pain, nausea, weakness, palpitations, edema, dyspnea.  Patient denies fever, chills, nausea, vomiting.  As tachycardia did not improve he decided to go to [facility] ED.      Upon arrival to the ED vitals temp 97, HR 93, RR 22, /75, O2 sat 97% on 4 L nasal cannula.  Labs notable for troponin 0.117, proBNP 7648, glucose 198, , K4.5, CO2 32, creatinine 1.91, BUN 68, GFR 35, WBC 7.4, Hgb 8.0, platelets 334, neutrophils 65.9.  EKG shows tachycardia rate 126 repolarization abnormality suggest ischemia diffuse leads.  Chest x-ray shows stable cardiomegaly with pulmonary interstitial edema pattern trace bilateral pleural effusions.  Patient treated in the ED with 10 mg IV Cardizem, IV Cardizem drip initiated.      Review of Systems   Constitutional: Negative. Negative for chills and fever.   HENT: Negative.    Eyes: Negative.    Cardiovascular: Positive for chest pain, dyspnea on exertion, irregular heartbeat, leg swelling and palpitations.   Respiratory: Positive for shortness of breath. Negative for cough.    Endocrine:  Negative.    Hematologic/Lymphatic: Negative.    Skin: Negative.    Musculoskeletal: Positive for arthritis.   Gastrointestinal: Negative.  Negative for nausea and vomiting.   Genitourinary: Negative.    Neurological: Negative.    Psychiatric/Behavioral: Negative.    Allergic/Immunologic: Negative.    All other systems reviewed and are negative.       Personal History     Past Medical History:   Diagnosis Date   • Appetite loss    • Brain bleed (CMS/[facility])    • Carpal tunnel syndrome on left     carpal tunnel on left hand   • CHF (congestive heart failure) (CMS/[facility])    • Diabetic neuropathy (CMS/[facility])    • DM2 (diabetes mellitus, type 2) (CMS/[facility])    • History of angina    • Hyperlipidemia    • Hypogonadism in male    • Obesity    • Sleep apnea    • Stroke (CMS/[facility])    • Unsteady gait        Past Surgical History:   Procedure Laterality Date   • ANGIOPLASTY      X2   • APPENDECTOMY     • CARDIAC CATHETERIZATION  11/13/2015   • CARDIAC CATHETERIZATION N/A 5/24/2021    Procedure: Left Heart Cath and coronary angiogram;  Surgeon: Jose G Rm MD;  Location: [facility] CATH INVASIVE LOCATION;  Service: Cardiovascular;  Laterality: N/A;   • CARDIAC CATHETERIZATION  5/24/2021    Procedure: Saphenous Vein Graft;  Surgeon: Jose G Rm MD;  Location: [facility] CATH INVASIVE LOCATION;  Service: Cardiovascular;;   • CARDIAC CATHETERIZATION N/A 5/24/2021    Procedure: Percutaneous Coronary Intervention;  Surgeon: Bernabe Zambrano MD;  Location: [facility] CATH INVASIVE LOCATION;  Service: Cardiology;  Laterality: N/A;   • CARDIAC CATHETERIZATION N/A 5/24/2021    Procedure: Stent NIELS coronary;  Surgeon: Bernabe Zambrano MD;  Location: [facility] CATH INVASIVE LOCATION;  Service: Cardiology;  Laterality: N/A;   • CARDIAC CATHETERIZATION N/A 5/26/2021    Procedure: Percutaneous Coronary Intervention;  Surgeon: Bernabe Zambrano MD;  Location: [facility] CATH INVASIVE LOCATION;  Service: Cardiovascular;  Laterality: N/A;   • CARDIAC CATHETERIZATION  N/A 5/26/2021    Procedure: Stent NIELS bypass graft;  Surgeon: Bernabe Zambrano MD;  Location: [facility] CATH INVASIVE LOCATION;  Service: Cardiovascular;  Laterality: N/A;   • CARDIAC ELECTROPHYSIOLOGY PROCEDURE N/A 5/24/2021    Procedure: Temporary Pacemaker;  Surgeon: Bernabe Zambrano MD;  Location: [facility] CATH INVASIVE LOCATION;  Service: Cardiology;  Laterality: N/A;   • CARDIAC ELECTROPHYSIOLOGY PROCEDURE N/A 5/26/2021    Procedure: Temporary Pacemaker;  Surgeon: Bernabe Zambrano MD;  Location: [facility] CATH INVASIVE LOCATION;  Service: Cardiovascular;  Laterality: N/A;   • CARPAL TUNNEL RELEASE Left 04/29/2018    carpal tunnel- lt hand// other hand surgeries    • CATARACT EXTRACTION, BILATERAL  2002    Dr. Lux Acosta   • CHOLECYSTECTOMY     • COLON RESECTION  2005   • CORONARY ANGIOPLASTY     • CORONARY ANGIOPLASTY WITH STENT PLACEMENT  11/13/2015    PTCA stent to proximal in stent and mid to distal lad   • CORONARY ANGIOPLASTY WITH STENT PLACEMENT  09/16/2016    PTCA stent to mid lad and stent to vein graft to marginal   • CORONARY ARTERY BYPASS GRAFT  2005    @ [facility]   • CYST REMOVAL      cyst removed from scrotum   • FOOT SURGERY Right 07/17/2018   • FOOT SURGERY Left 02/04/2019   • TOE AMPUTATION Right     right toe removed D/T infected cut that went to the bone       Family History: family history includes Diabetes in his brother and sister; Heart disease in his father, mother, and sister; Mental illness in his brother. Otherwise pertinent FHx was reviewed and not pertinent to current issue.    Social History:  reports that he has quit smoking. His smoking use included cigarettes. He has a 60.00 pack-year smoking history. He has never used smokeless tobacco. He reports current drug use. Frequency: 1.00 time per week. Drug: Marijuana. He reports that he does not drink alcohol.    Home Medications:  Prior to Admission Medications     Prescriptions Last Dose Informant Patient Reported? Taking?    albuterol sulfate   (90 Base) MCG/ACT inhaler  Nursing Home Yes No    Inhale 2 puffs Every 4 (Four) Hours As Needed.    apixaban (ELIQUIS) 5 MG tablet tablet  Nursing Home Yes No    Take 5 mg by mouth Daily.    aspirin 81 MG chewable tablet  Nursing Home Yes No    Chew 81 mg Daily.    atorvastatin (LIPITOR) 40 MG tablet  Nursing Home Yes No    Take 40 mg by mouth Every Night.    cholecalciferol (VITAMIN D3) 25 MCG (1000 UT) tablet  Nursing Home Yes No    Take 1,000 Units by mouth Daily.    dextrose (GLUTOSE) 40 % gel  Nursing Home Yes No    Take  by mouth Every 1 (One) Hour As Needed for Low Blood Sugar.    donepezil (ARICEPT) 10 MG tablet  Nursing Home Yes No    Take 10 mg by mouth Every Night.    DULoxetine HCl (DRIZALMA) 60 MG capsule  Nursing Home Yes No    Take 60 mg by mouth Daily.    furosemide (LASIX) 40 MG tablet  Nursing Home No No    Take 1 tablet by mouth 2 (Two) Times a Day for 30 days.    gabapentin (NEURONTIN) 100 MG capsule  Pharmacy No No    Take 2 capsules by mouth 2 (two) times a day.    hydrALAZINE (APRESOLINE) 50 MG tablet  Nursing Home Yes No    Take 50 mg by mouth 2 (two) times a day. Hold for SBP <110    isosorbide mononitrate (IMDUR) 60 MG 24 hr tablet  Nursing Home Yes No    Take 60 mg by mouth 2 (Two) Times a Day.    metoprolol succinate XL (TOPROL-XL) 100 MG 24 hr tablet   No No    Take 1 tablet by mouth Daily for 30 days.    nitroglycerin (NITROSTAT) 0.4 MG SL tablet  Nursing Home No No    Place 1 tablet under the tongue Every 5 (Five) Minutes As Needed for Chest Pain (Only if SBP Greater Than 100). Take no more than 3 doses in 15 minutes.    spironolactone (ALDACTONE) 25 MG tablet   No No    Take 1 tablet by mouth Daily for 30 days.    tiotropium (Spiriva HandiHaler) 18 MCG per inhalation capsule  Nursing Home No No    Place 1 capsule into inhaler and inhale Daily.    vitamin B-12 (CYANOCOBALAMIN) 1000 MCG tablet  Nursing Home Yes No    Take 1,000 mcg by mouth Daily.            Allergies:  Allergies    Allergen Reactions   • Codeine Itching       Objective      Vitals:   Temp:  [97 °F (36.1 °C)-98.3 °F (36.8 °C)] 97.6 °F (36.4 °C)  Heart Rate:  [] 81  Resp:  [18-22] 20  BP: (112-148)/(52-89) 132/52  Flow (L/min):  [2-4] 2    Physical Exam  Constitutional:       Appearance: He is ill-appearing.   HENT:      Head: Normocephalic and atraumatic.      Nose: Nose normal.      Mouth/Throat:      Mouth: Mucous membranes are dry.   Eyes:      General: Visual field deficit present. No scleral icterus.  Cardiovascular:      Rate and Rhythm: Normal rate. Rhythm irregular.      Heart sounds: Murmur heard.   Systolic murmur is present with a grade of 2/6.     Pulmonary:      Effort: Pulmonary effort is normal.      Breath sounds: Normal air entry. Examination of the right-middle field reveals decreased breath sounds. Examination of the left-middle field reveals decreased breath sounds. Examination of the right-lower field reveals decreased breath sounds. Examination of the left-lower field reveals decreased breath sounds. Decreased breath sounds present.   Abdominal:      General: Bowel sounds are normal.      Palpations: Abdomen is soft.      Tenderness: There is no abdominal tenderness. There is no guarding.   Musculoskeletal:      Cervical back: Normal range of motion.      Right lower leg: Edema present.      Left lower leg: Edema present.   Skin:     General: Skin is warm and dry.      Coloration: Skin is sallow.      Findings: Ecchymosis present.   Neurological:      Mental Status: He is lethargic.      Motor: Weakness present.   Psychiatric:         Attention and Perception: He is inattentive.         Mood and Affect: Affect is flat.         Speech: Speech is delayed.         Behavior: Behavior is slowed.          Result Review    Result Review:  I have personally reviewed the results from the time of this admission to 8/4/2021 06:30 EDT and agree with these findings:  [x]  Laboratory  [x]  Microbiology  [x]   Radiology  [x]  EKG/Telemetry   [x]  Cardiology/Vascular   []  Pathology  []  Old records  []  Other:        Assessment/Plan        Active Hospital Problems:  Active Hospital Problems    Diagnosis    • Acute on chronic systolic heart failure (CMS/HCC)    • Elevated troponin    • CKD (chronic kidney disease) stage 3, GFR 30-59 ml/min (CMS/HCC)    • Mixed hyperlipidemia    • Chronic obstructive pulmonary disease, unspecified (CMS/HCC)    • Paroxysmal atrial fibrillation (CMS/HCC)    • Essential hypertension    • Chronic foot ulcer (CMS/HCC)    • Coronary artery disease    • Diabetic neuropathy (CMS/HCC)    • Tobacco use disorder    • Flaccid hemiplegia of right dominant side as late effect of cerebral infarction (CMS/HCC)    • Major depressive disorder, recurrent, mild (CMS/HCC)    • Unspecified dementia with behavioral disturbance (CMS/HCC)    • Immobility syndrome    • S/P CABG (coronary artery bypass graft)    • Chronic venous hypertension (idiopathic) with ulcer and inflammation of bilateral lower extremity (CMS/HCC)    • History of amputation of lesser toe (CMS/HCC)    • ANNETTE treated with BiPAP      Paroxysmal atrial fibrillation:  -Patient treated in the ED with 10 mg IV Cardizem, IV Cardizem drip initiated  -Cardiology consulted by the ED provider, patient under the care of Dr. Rm  -Continue Cardizem drip titrate to keep heart rate less than 120  -Continuous cardiac monitoring  -Hold home Eliquis, metoprolol for possible cardiac procedures in the a.m.  -N.p.o. at midnight for possible cardiac procedures in the a.m.    Acute on chronic systolic heart failure:  -Secondary to hypertensive heart disease  -Upon arrival to the ED proBNP 7648  -40 mg IV Lasix every 8 hours  -N.p.o. at midnight for possible cardiac procedures in the a.m.  -Echocardiogram 07/21/2021 shows EF 30%  -Continuous cardiac monitoring  -EKG now and every 6 hours x2  -Troponin level now and every 6 hours x 2  -O2 as needed to keep sats greater  than 90%  -Continue home medication Aldactone 25 mg p.o. daily    Elevated troponin:  -Upon arrival to the ED troponin 0.117  -Cardiology consulted by the ED provider, patient under care of Dr. Rm  -EKG now and every 6 hours x 2  -Troponin level now and every 6 hours x 2  -Continuous cardiac monitoring     CKD stage III:  -IV Lasix 40 mg every 8 hours  -Consult nephrology, patient under the care of Dr. Bhat  -Monitor BMP during admission  -Monitor intake and output  -Avoid nephrotoxic medication      Diabetes mellitus:  -Sliding scale insulin  -Consistent carbohydrate diet  -Before meals and at bedtime Accu-Cheks  -Hypoglycemia protocol ordered     Essential hypertension:  -Continue home medication hydralazine 50 mg p.o. twice daily  -Continue home medication isosorbide 60 mg p.o. daily     Dyslipidemia:  -Continue home medication atorvastatin 40 mg p.o. nightly     Obesity:  -encourage dietary modifications     Coronary artery disease/history of stent/history of CABG  -Continue ASA 81 mg p.o. daily  -Continue home medication Plavix 75 mg p.o. daily  -Continue home medication isosorbide 60 mg p.o. twice daily     COPD:  -O2 as needed to keep sats greater than 90%  -DuoNeb nebulizer treatment 4 times daily  -Home medications Spiriva nonformulary     ANNETTE with BiPAP:  -O2 as needed to keep sats greater than 90%  -May use home CPAP nightly as needed     Immobility syndrome:  -Acetaminophen 650 mg every 4 as needed for pain    History of CVA/right hemiaplasia:  -CVA in November 2020     Diabetic neuropathy:  -Continue home medication Cymbalta 60 mg p.o. daily     Depression:  -Continue home medication Cymbalta 60 mg p.o. daily     Unspecified dementia:  -Continue home medication Aricept 10 mg p.o. nightly          DVT prophylaxis:  No DVT prophylaxis order currently exists.    CODE STATUS:    Code Status: CPR  Medical Interventions (Level of Support Prior to Arrest): Full    Admission Status:  I believe this  "patient meets observation status.    I discussed the patient's findings and my recommendations with the patient.     This patient has been interviewed wearing appropriate personal protective equipment and findings discussed with the ED provider.        Signature: Electronically signed by FABIOLA Townsend, 08/04/21, 6:42 AM EDT.      Electronically signed by Stanton Junior MD at 08/04/21 1539          Physician Progress Notes (last 24 hours) (Notes from 08/17/21 1151 through 08/18/21 1151)      Yony Bhat MD at 08/18/21 0949          RENAL/KCC:     LOS: 14 days    Patient Care Team:  Samantha Zabala APRN as PCP - General  Samantha Zabala APRN as PCP - Family Medicine  Jose G Rm MD as Consulting Physician (Cardiology)    Chief Complaint:  Palpitations    Subjective     Interval History:   Out of the ICU, for HD today.    Objective     Vital Sign Min/Max for last 24 hours  Temp  Min: 98.1 °F (36.7 °C)  Max: 98.5 °F (36.9 °C)   BP  Min: 87/51  Max: 129/71   Pulse  Min: 61  Max: 121   Resp  Min: 12  Max: 22   SpO2  Min: 84 %  Max: 100 %   Flow (L/min)  Min: 15  Max: 25   Weight  Min: 108 kg (238 lb 12.1 oz)  Max: 108 kg (238 lb 12.1 oz)     Flowsheet Rows      First Filed Value   Admission Height  180.3 cm (71\") Documented at 08/03/2021 1733   Admission Weight  113 kg (250 lb) Documented at 08/03/2021 1733          I/O this shift:  In: -   Out: 300 [Urine:300]  I/O last 3 completed shifts:  In: 560 [P.O.:560]  Out: 5100 [Urine:1100; Other:4000]    Physical Exam:  GEN: Awake, NAD  ENT: PERRL, EOMI, MMM  NECK: Supple, no JVD  CHEST: CTAB, no W/R/C  CV: RRR, no M/G/R  ABD: Soft, NT, +BS  SKIN: Warm and Dry  NEURO: CN's intact      WBC WBC   Date Value Ref Range Status   08/18/2021 10.00 3.40 - 10.80 10*3/mm3 Final   08/17/2021 7.60 3.40 - 10.80 10*3/mm3 Final   08/16/2021 7.20 3.40 - 10.80 10*3/mm3 Final   08/16/2021 7.10 3.40 - 10.80 10*3/mm3 Final      HGB Hemoglobin   Date Value Ref Range Status "   08/18/2021 8.6 (L) 13.0 - 17.7 g/dL Final   08/17/2021 7.7 (L) 13.0 - 17.7 g/dL Final   08/16/2021 7.6 (L) 13.0 - 17.7 g/dL Final   08/16/2021 7.6 (L) 13.0 - 17.7 g/dL Final   08/16/2021 6.6 (C) 13.0 - 17.7 g/dL Final      HCT Hematocrit   Date Value Ref Range Status   08/18/2021 26.0 (L) 37.5 - 51.0 % Final   08/17/2021 23.6 (L) 37.5 - 51.0 % Final   08/16/2021 22.9 (L) 37.5 - 51.0 % Final   08/16/2021 22.9 (L) 37.5 - 51.0 % Final   08/16/2021 19.5 (C) 37.5 - 51.0 % Final      Platlets No results found for: LABPLAT   MCV MCV   Date Value Ref Range Status   08/18/2021 86.5 79.0 - 97.0 fL Final   08/17/2021 86.3 79.0 - 97.0 fL Final   08/16/2021 86.2 79.0 - 97.0 fL Final   08/16/2021 86.4 79.0 - 97.0 fL Final          Sodium Sodium   Date Value Ref Range Status   08/18/2021 137 136 - 145 mmol/L Final   08/17/2021 134 (L) 136 - 145 mmol/L Final   08/16/2021 136 136 - 145 mmol/L Final      Potassium Potassium   Date Value Ref Range Status   08/18/2021 4.3 3.5 - 5.2 mmol/L Final     Comment:     Result checked    08/17/2021 3.2 (L) 3.5 - 5.2 mmol/L Final   08/16/2021 3.5 3.5 - 5.2 mmol/L Final      Chloride Chloride   Date Value Ref Range Status   08/18/2021 97 (L) 98 - 107 mmol/L Final   08/17/2021 97 (L) 98 - 107 mmol/L Final   08/16/2021 93 (L) 98 - 107 mmol/L Final      CO2 CO2   Date Value Ref Range Status   08/18/2021 28.0 22.0 - 29.0 mmol/L Final   08/17/2021 28.0 22.0 - 29.0 mmol/L Final   08/16/2021 32.0 (H) 22.0 - 29.0 mmol/L Final      BUN BUN   Date Value Ref Range Status   08/18/2021 49 (H) 8 - 23 mg/dL Final   08/17/2021 35 (H) 8 - 23 mg/dL Final   08/16/2021 57 (H) 8 - 23 mg/dL Final      Creatinine Creatinine   Date Value Ref Range Status   08/18/2021 2.48 (H) 0.76 - 1.27 mg/dL Final   08/17/2021 1.77 (H) 0.76 - 1.27 mg/dL Final   08/16/2021 2.45 (H) 0.76 - 1.27 mg/dL Final      Calcium Calcium   Date Value Ref Range Status   08/18/2021 8.6 8.6 - 10.5 mg/dL Final   08/17/2021 7.4 (L) 8.6 - 10.5 mg/dL  Final   08/16/2021 8.4 (L) 8.6 - 10.5 mg/dL Final      PO4 No results found for: CAPO4   Albumin Albumin   Date Value Ref Range Status   08/18/2021 3.40 (L) 3.50 - 5.20 g/dL Final   08/17/2021 3.10 (L) 3.50 - 5.20 g/dL Final   08/16/2021 3.20 (L) 3.50 - 5.20 g/dL Final      Magnesium Magnesium   Date Value Ref Range Status   08/18/2021 1.9 1.6 - 2.4 mg/dL Final      Uric Acid No results found for: URICACID        Results Review:     I reviewed the patient's new clinical results.    amiodarone, 200 mg, Oral, Q12H  apixaban, 5 mg, Oral, Q12H  aspirin, 81 mg, Oral, Daily  atorvastatin, 40 mg, Oral, Nightly  budesonide, 0.5 mg, Nebulization, BID - RT  bumetanide, 2 mg, Intravenous, Q12H  carvedilol, 6.25 mg, Oral, BID With Meals  cholecalciferol, 1,000 Units, Oral, Daily  dilTIAZem CD, 240 mg, Oral, Q24H  donepezil, 10 mg, Oral, Nightly  DULoxetine, 60 mg, Oral, Daily  epoetin izabella-epbx, 20,000 Units, Subcutaneous, Weekly  insulin lispro, 0-14 Units, Subcutaneous, TID AC  ipratropium-albuterol, 3 mL, Nebulization, Q4H - RT  isosorbide mononitrate, 60 mg, Oral, BID - Nitrates  metOLazone, 5 mg, Oral, Daily  sodium chloride, 10 mL, Intravenous, Q12H  vitamin B-12, 1,000 mcg, Oral, Daily           Medication Review: Reviewed    Assessment/Plan       Acute on chronic systolic heart failure (CMS/HCC)    S/P CABG (coronary artery bypass graft)    Essential hypertension    Elevated troponin    ANNETTE treated with BiPAP    Major depressive disorder, recurrent, mild (CMS/HCC)    Mixed hyperlipidemia    History of cerebrovascular accident    Flaccid hemiplegia of right dominant side as late effect of cerebral infarction (CMS/HCC)    Diabetic neuropathy (CMS/HCC)    Unspecified dementia with behavioral disturbance (CMS/HCC)    Coronary artery disease    Obesity    Vitamin D deficiency    Chronic venous hypertension (idiopathic) with ulcer and inflammation of bilateral lower extremity (CMS/HCC)    Chronic obstructive pulmonary  disease, unspecified (CMS/HCC)    Chronic foot ulcer (CMS/HCC)    Paroxysmal atrial fibrillation (CMS/HCC)    Acute kidney injury (CMS/HCC)    Type 2 diabetes mellitus with hyperglycemia (CMS/HCC)    CKD (chronic kidney disease) stage 3, GFR 30-59 ml/min (CMS/HCC)    Anemia of renal disease    Acute respiratory failure with hypoxia and hypercapnia (CMS/HCC)      1. DILEEP  2. CKD III  3. Hyperkalemia  4. Pulmonary edema  5. Tachycardia  6. CHF  7. T2DM  8. Hypertension  9. Anemia     PLAN:  HD again today.  Will plan permcath later this week.  Social work to look for outpatient HD spot for acute renal failure.  Will reassess in AM.         Yony Bhat M.D.  [facility]    Electronically signed by Yony Bhat MD at 08/18/21 0956     Jose G Rm MD at 08/18/21 0699          Referring Provider: Maurilio Boucher,*    Reason for follow-up:  Atrial fibrillation  Cardiomyopathy  Shortness of breath  Bradycardia  Respiratory insufficiency-patient was transferred to ICU for BiPAP.    Patient Care Team:  Samantha Zabala APRN as PCP - General  Samantha Zabala APRN as PCP - Family Medicine  Jose G Rm MD as Consulting Physician (Cardiology)    Subjective .  Feeling much better after dialysis was started.    ROS  Since I have last seen, the patient has been without any chest discomfort ,shortness of breath, palpitations, dizziness or syncope.  Denies having any headache ,abdominal pain ,nausea, vomiting , diarrhea constipation, loss of weight or loss of appetite.  Denies having any excessive bruising ,hematuria or blood in the stool.    Review of all systems negative except as indicated.    Reviewed ROS.  History  Past Medical History:   Diagnosis Date   • A-fib (CMS/HCC) 8/3/2021   • Appetite loss    • Arthritis    • Brain bleed (CMS/HCC)    • Carpal tunnel syndrome on left    • CHF (congestive heart failure) (CMS/[facility])    • Chronic left-sided low back pain without sciatica 12/27/2017    • CKD (chronic kidney disease) stage 3, GFR 30-59 ml/min (CMS/[facility])    • Closed fracture of seventh thoracic vertebra (CMS/[facility]) 8/23/2020   • Cognitive communication deficit 12/1/2020   • COVID-19 2/19/2021   • Cytokine release syndrome, grade 1 5/24/2021   • Depression    • Diabetic neuropathy ([facility] [facility]    • DM2 (diabetes mellitus, type 2) (CMS/[facility])    • Hyperlipidemia    • Hypogonadism in male    • Nonsustained ventricular tachycardia (CMS/[facility]) 7/23/2021   • Obesity    • Paroxysmal atrial fibrillation (CMS/[facility]) 12/1/2020   • Sleep apnea    • Stroke (CMS/[facility])    • Unsteady gait        Past Surgical History:   Procedure Laterality Date   • ANGIOPLASTY      X2   • APPENDECTOMY     • CARDIAC CATHETERIZATION  11/13/2015   • CARDIAC CATHETERIZATION N/A 5/24/2021    Procedure: Left Heart Cath and coronary angiogram;  Surgeon: Jose G Rm MD;  Location: [facility] CATH INVASIVE LOCATION;  Service: Cardiovascular;  Laterality: N/A;   • CARDIAC CATHETERIZATION  5/24/2021    Procedure: Saphenous Vein Graft;  Surgeon: Jose G Rm MD;  Location:  [facility] CATH INVASIVE LOCATION;  Service: Cardiovascular;;   • CARDIAC CATHETERIZATION N/A 5/24/2021    Procedure: Percutaneous Coronary Intervention;  Surgeon: Bernabe Zambrano MD;  Location:  [facility] CATH INVASIVE LOCATION;  Service: Cardiology;  Laterality: N/A;   • CARDIAC CATHETERIZATION N/A 5/24/2021    Procedure: Stent NIELS coronary;  Surgeon: Bernabe Zambrano MD;  Location:  [facility] CATH INVASIVE LOCATION;  Service: Cardiology;  Laterality: N/A;   • CARDIAC CATHETERIZATION N/A 5/26/2021    Procedure: Percutaneous Coronary Intervention;  Surgeon: Bernabe Zambrano MD;  Location:  [facility] CATH INVASIVE LOCATION;  Service: Cardiovascular;  Laterality: N/A;   • CARDIAC CATHETERIZATION N/A 5/26/2021    Procedure: Stent NIELS bypass graft;  Surgeon: Bernabe Zambrano MD;  Location:  [facility] CATH INVASIVE LOCATION;  Service: Cardiovascular;  Laterality: N/A;   • CARDIAC ELECTROPHYSIOLOGY PROCEDURE  N/A 5/24/2021    Procedure: Temporary Pacemaker;  Surgeon: Bernabe Zambrano MD;  Location: [facility] CATH INVASIVE LOCATION;  Service: Cardiology;  Laterality: N/A;   • CARDIAC ELECTROPHYSIOLOGY PROCEDURE N/A 5/26/2021    Procedure: Temporary Pacemaker;  Surgeon: Bernabe Zambrano MD;  Location: [facility] CATH INVASIVE LOCATION;  Service: Cardiovascular;  Laterality: N/A;   • CARPAL TUNNEL RELEASE Left 04/29/2018    carpal tunnel- lt hand// other hand surgeries    • CATARACT EXTRACTION, BILATERAL  2002    Dr. Lux Acosta   • CHOLECYSTECTOMY     • COLON RESECTION  2005   • CORONARY ANGIOPLASTY     • CORONARY ANGIOPLASTY WITH STENT PLACEMENT  11/13/2015    PTCA stent to proximal in stent and mid to distal lad   • CORONARY ANGIOPLASTY WITH STENT PLACEMENT  09/16/2016    PTCA stent to mid lad and stent to vein graft to marginal   • CORONARY ARTERY BYPASS GRAFT  2005    @ [facility]   • CYST REMOVAL      cyst removed from scrotum   • FOOT SURGERY Right 07/17/2018   • FOOT SURGERY Left 02/04/2019   • TOE AMPUTATION Right     right toe removed D/T infected cut that went to the bone       Family History   Problem Relation Age of Onset   • Heart disease Mother    • Heart disease Father    • Diabetes Sister    • Heart disease Sister    • Diabetes Brother    • Mental illness Brother        Social History     Tobacco Use   • Smoking status: Former Smoker     Packs/day: 1.00     Years: 60.00     Pack years: 60.00     Types: Cigarettes   • Smokeless tobacco: Never Used   • Tobacco comment: Patient quit smoking 11/2020   Vaping Use   • Vaping Use: Never used   Substance Use Topics   • Alcohol use: No   • Drug use: Yes     Frequency: 1.0 times per week     Types: Marijuana     Comment: socially        Medications Prior to Admission   Medication Sig Dispense Refill Last Dose   • albuterol sulfate  (90 Base) MCG/ACT inhaler Inhale 2 puffs Every 4 (Four) Hours As Needed.      • apixaban (ELIQUIS) 5 MG tablet tablet Take 5 mg by mouth Daily.      •  aspirin 81 MG chewable tablet Chew 81 mg Daily.      • atorvastatin (LIPITOR) 40 MG tablet Take 40 mg by mouth Every Night.      • cholecalciferol (VITAMIN D3) 25 MCG (1000 UT) tablet Take 1,000 Units by mouth Daily.      • dextrose (GLUTOSE) 40 % gel Take  by mouth Every 1 (One) Hour As Needed for Low Blood Sugar.      • donepezil (ARICEPT) 10 MG tablet Take 10 mg by mouth Every Night.      • DULoxetine HCl (DRIZALMA) 60 MG capsule Take 60 mg by mouth Daily.      • gabapentin (NEURONTIN) 100 MG capsule Take 2 capsules by mouth 2 (two) times a day. 6 capsule 0    • hydrALAZINE (APRESOLINE) 50 MG tablet Take 50 mg by mouth 2 (two) times a day. Hold for SBP <110      • isosorbide mononitrate (IMDUR) 60 MG 24 hr tablet Take 60 mg by mouth 2 (Two) Times a Day.      • metoprolol succinate XL (TOPROL-XL) 100 MG 24 hr tablet Take 1 tablet by mouth Daily for 30 days. 30 tablet 0    • nitroglycerin (NITROSTAT) 0.4 MG SL tablet Place 1 tablet under the tongue Every 5 (Five) Minutes As Needed for Chest Pain (Only if SBP Greater Than 100). Take no more than 3 doses in 15 minutes. 30 tablet 12    • spironolactone (ALDACTONE) 25 MG tablet Take 1 tablet by mouth Daily for 30 days. 30 tablet 0    • tiotropium (Spiriva HandiHaler) 18 MCG per inhalation capsule Place 1 capsule into inhaler and inhale Daily. 30 capsule 1    • vitamin B-12 (CYANOCOBALAMIN) 1000 MCG tablet Take 1,000 mcg by mouth Daily.      • [DISCONTINUED] furosemide (LASIX) 40 MG tablet Take 1 tablet by mouth 2 (Two) Times a Day for 30 days. 60 tablet 0        Allergies  Codeine    Scheduled Meds:amiodarone, 200 mg, Oral, Q12H  apixaban, 5 mg, Oral, Q12H  aspirin, 81 mg, Oral, Daily  atorvastatin, 40 mg, Oral, Nightly  budesonide, 0.5 mg, Nebulization, BID - RT  bumetanide, 2 mg, Intravenous, Q12H  carvedilol, 6.25 mg, Oral, BID With Meals  cholecalciferol, 1,000 Units, Oral, Daily  dilTIAZem CD, 240 mg, Oral, Q24H  donepezil, 10 mg, Oral, Nightly  DULoxetine, 60 mg,  "Oral, Daily  epoetin izabella-epbx, 20,000 Units, Subcutaneous, Weekly  hydrALAZINE, 50 mg, Oral, Q12H  insulin lispro, 0-14 Units, Subcutaneous, TID AC  ipratropium-albuterol, 3 mL, Nebulization, Q4H - RT  isosorbide mononitrate, 60 mg, Oral, BID - Nitrates  metOLazone, 5 mg, Oral, Daily  sodium chloride, 10 mL, Intravenous, Q12H  vitamin B-12, 1,000 mcg, Oral, Daily      Continuous Infusions:   PRN Meds:.•  acetaminophen **OR** acetaminophen **OR** acetaminophen  •  aluminum-magnesium hydroxide-simethicone  •  dextrose  •  dextrose  •  glucagon (human recombinant)  •  heparin (porcine)  •  insulin lispro **AND** insulin lispro  •  melatonin  •  nitroglycerin  •  ondansetron **OR** ondansetron  •  [COMPLETED] Insert peripheral IV **AND** sodium chloride  •  sodium chloride    Objective     VITAL SIGNS  Vitals:    08/18/21 0258 08/18/21 0300 08/18/21 0500 08/18/21 0640   BP:  114/73 119/73 108/67   BP Location:    Left arm   Patient Position:    Lying   Pulse: 107 109 97 120   Resp: 22   15   Temp:    98.3 °F (36.8 °C)   TempSrc:    Oral   SpO2: 98% 97%  97%   Weight:    108 kg (238 lb 12.1 oz)   Height:           Flowsheet Rows      First Filed Value   Admission Height  180.3 cm (71\") Documented at 08/03/2021 1733   Admission Weight  113 kg (250 lb) Documented at 08/03/2021 1733            Intake/Output Summary (Last 24 hours) at 8/18/2021 0651  Last data filed at 8/18/2021 0640  Gross per 24 hour   Intake 440 ml   Output 800 ml   Net -360 ml        TELEMETRY: Atrial fibrillation with moderate ventricular response.    Physical Exam:  The patient is awake and alert and not in distress.  Vital signs as noted above.  Exogenous obesity.  Head and neck revealed no carotid bruits or jugular venous distention.  No thyromegaly or lymphadenopathy is present  Lungs clear.  No wheezing.  Breath sounds are normal bilaterally.  Heart normal first and second heart sounds.  No murmur. No precordial rub is present.  No gallop is " present.  Abdomen soft and nontender.  No organomegaly is present.  Extremities with good peripheral pulses without any pedal edema.  Skin warm and dry.  Musculoskeletal system is grossly normal  CNS-grossly normal.  Results Review:   I reviewed the patient's new clinical results.  Lab Results (last 24 hours)     Procedure Component Value Units Date/Time    CBC & Differential [[id number]]  (Abnormal) Collected: 08/18/21 0607    Specimen: Blood Updated: 08/18/21 0642    Narrative:      The following orders were created for panel order CBC & Differential.  Procedure                               Abnormality         Status                     ---------                               -----------         ------                     CBC Auto Differential[[id number]]        Abnormal            Final result                 Please view results for these tests on the individual orders.    CBC Auto Differential [[id number]]  (Abnormal) Collected: 08/18/21 0607    Specimen: Blood Updated: 08/18/21 0642     WBC 10.00 10*3/mm3      RBC 3.00 10*6/mm3      Hemoglobin 8.6 g/dL      Hematocrit 26.0 %      MCV 86.5 fL      MCH 28.5 pg      MCHC 33.0 g/dL      RDW 16.2 %      RDW-SD 49.4 fl      MPV 7.6 fL      Platelets 383 10*3/mm3      Neutrophil % 62.2 %      Lymphocyte % 20.0 %      Monocyte % 9.8 %      Eosinophil % 7.5 %      Basophil % 0.5 %      Neutrophils, Absolute 6.20 10*3/mm3      Lymphocytes, Absolute 2.00 10*3/mm3      Monocytes, Absolute 1.00 10*3/mm3      Eosinophils, Absolute 0.80 10*3/mm3      Basophils, Absolute 0.00 10*3/mm3      nRBC 0.1 /100 WBC     Renal Function Panel [[id number]] Collected: 08/18/21 0607    Specimen: Blood Updated: 08/18/21 0639    Magnesium [[id number]] Collected: 08/18/21 0607    Specimen: Blood Updated: 08/18/21 0639    POC Glucose Once [[id number]]  (Abnormal) Collected: 08/17/21 2041    Specimen: Blood Updated: 08/17/21 2043     Glucose 269 mg/dL      Comment: Serial Number:  [id number]:  [id number]       POC Glucose Once [[id number]]  (Abnormal) Collected: 08/17/21 1606    Specimen: Blood Updated: 08/17/21 1607     Glucose 210 mg/dL      Comment: Serial Number: [id number]:  [id number]       POC Glucose Once [[id number]]  (Abnormal) Collected: 08/17/21 1106    Specimen: Blood Updated: 08/17/21 1108     Glucose 300 mg/dL      Comment: Serial Number: [id number]:  [id number]             Imaging Results (Last 24 Hours)     ** No results found for the last 24 hours. **      LAB RESULTS (LAST 7 DAYS)    CBC  Results from last 7 days   Lab Units 08/18/21  0607 08/17/21  0538 08/16/21  1120 08/16/21  0436 08/14/21  0440 08/14/21  0432 08/14/21  0412 08/13/21  0321 08/13/21  0321   WBC 10*3/mm3 10.00 7.60 7.20 7.10  --   --  10.30  --  7.70   RBC 10*6/mm3 3.00* 2.73* 2.66* 2.26*  --   --  2.79*  --  2.54*   HEMOGLOBIN g/dL 8.6* 7.7* 7.6*  7.6* 6.6*  --   --  8.1*   < > 7.5*   HEMOGLOBIN, POC g/dL  --   --   --   --  10.7* 8.3*  --   --   --    HEMATOCRIT % 26.0* 23.6* 22.9*  22.9* 19.5*  --   --  24.3*   < > 22.1*   HEMATOCRIT POC %  --   --   --   --  31* 25*  --   --   --    MCV fL 86.5 86.3 86.2 86.4  --   --  86.9  --  87.1   PLATELETS 10*3/mm3 383 314 305 301  --   --  309  --  279    < > = values in this interval not displayed.       BMP  Results from last 7 days   Lab Units 08/17/21  0538 08/16/21  0308 08/15/21  0447 08/14/21  0800 08/14/21  0412 08/13/21  0734 08/13/21  0321 08/12/21  0432 08/12/21  0432 08/11/21  1253 08/11/21  0745   SODIUM mmol/L 134* 136 133*  --  134*  --  133*  --  132*  --  136   POTASSIUM mmol/L 3.2* 3.5 3.4* 4.3 4.5 4.3 4.4  4.4   < > 4.7  4.7   < > 5.4*  5.4*   CHLORIDE mmol/L 97* 93* 91*  --  88*  --  89*  --  89*  --  92*   CO2 mmol/L 28.0 32.0* 32.0*  --  33.0*  --  33.0*  --  33.0*  --  34.0*   BUN mg/dL 35* 57* 45*  --  87*  --  82*  --  77*  --  72*   CREATININE mg/dL 1.77* 2.45* 1.70*  --  2.73*  --  2.95*  --  2.93*  --  2.81*    GLUCOSE mg/dL 156* 167* 149*  --  182*  --  162*  --  161*  --  183*   MAGNESIUM mg/dL  --   --   --   --  2.2  --  2.2  --   --   --   --    PHOSPHORUS mg/dL 2.0* 3.5 2.7  --  5.4*  --  5.3*  --  5.3*  --  5.7*    < > = values in this interval not displayed.       CMP   Results from last 7 days   Lab Units 08/17/21  0538 08/16/21  0308 08/15/21  0447 08/14/21  0800 08/14/21  0412 08/13/21  0734 08/13/21  0321 08/12/21  0432 08/12/21  0432 08/11/21  1253 08/11/21  0745   SODIUM mmol/L 134* 136 133*  --  134*  --  133*  --  132*  --  136   POTASSIUM mmol/L 3.2* 3.5 3.4* 4.3 4.5 4.3 4.4  4.4   < > 4.7  4.7   < > 5.4*  5.4*   CHLORIDE mmol/L 97* 93* 91*  --  88*  --  89*  --  89*  --  92*   CO2 mmol/L 28.0 32.0* 32.0*  --  33.0*  --  33.0*  --  33.0*  --  34.0*   BUN mg/dL 35* 57* 45*  --  87*  --  82*  --  77*  --  72*   CREATININE mg/dL 1.77* 2.45* 1.70*  --  2.73*  --  2.95*  --  2.93*  --  2.81*   GLUCOSE mg/dL 156* 167* 149*  --  182*  --  162*  --  161*  --  183*   ALBUMIN g/dL 3.10* 3.20* 3.50  --  3.80  --  3.60  --  3.60  --  3.70    < > = values in this interval not displayed.         BNP        TROPONIN        CoAg  Results from last 7 days   Lab Units 08/14/21 0441   INR  1.17*   APTT seconds 33.4*       Creatinine Clearance  Estimated Creatinine Clearance: 45.5 mL/min (A) (by C-G formula based on SCr of 1.77 mg/dL (H)).    ABG  Results from last 7 days   Lab Units 08/14/21  1641 08/14/21  0626 08/14/21  0440 08/14/21  0432 08/11/21  0833   PH, ARTERIAL pH units 7.444 7.294* 7.266* 7.270* 7.361   PCO2, ARTERIAL mm Hg 49.7* 72.5* 75.5* 78.3* 55.7*   PO2 ART mm Hg 173.2* 193.8* 97.8 50.4* 42.8*   O2 SATURATION ART % 99.6* 99.5* 96.0 77.7* 75.2*   BASE EXCESS ART mmol/L 9.0* 7.2* 5.5* 7.6* 5.4*       Radiology  XR Chest 1 View    Result Date: 8/17/2021   1. Continued findings of congestive heart failure and interstitial pulmonary edema. 2. Bilateral dependent pleural effusions. Bibasilar airspace  disease.  Electronically Signed By-Oscar Casper MD On:8/17/2021 7:24 AM This report was finalized on [id number] by  Oscar Casper MD.              EKG              I personally viewed and interpreted the patient's EKG/Telemetry data: Atrial fibrillation  ECHOCARDIOGRAM:    Results for orders placed during the hospital encounter of 07/20/21    Adult Transthoracic Echo Complete With Contrast if Necessary Per Protocol    Interpretation Summary  · Estimated left ventricular EF = 30% Left ventricular systolic function is moderately decreased.    Occasions  Shortness of breath.    Technically satisfactory study.  Mitral valve is structurally normal.  Mild mitral regurgitation.  Tricuspid valve is structurally normal.  Aortic valve is structurally normal.  Pulmonic valve could not be well visualized.  No evidence for tricuspid or aortic regurgitation is seen by Doppler study.  Biatrial and biventricular enlargement is present.  Diffuse left ventricular hypocontractility with ejection fraction of 30%.  Atrial septum is intact.  Aorta is normal.  No pericardial effusion or intracardiac thrombus is seen.    Impression  Mild mitral regurgitation.  Significant biatrial and biventricular enlargement.  Diffuse left ventricular hypocontractility with ejection fraction of 30%.  No pericardial effusion or intracardiac thrombus is seen.          STRESS MYOVIEW:    Cardiolite (Tc-99m Sestamibi) stress test    CARDIAC CATHETERIZATION:            OTHER:         Assessment/Plan     Principal Problem:    Acute on chronic systolic heart failure (CMS/HCC)  Active Problems:    S/P CABG (coronary artery bypass graft)    Essential hypertension    Elevated troponin    ANNETET treated with BiPAP    Major depressive disorder, recurrent, mild (CMS/HCC)    Mixed hyperlipidemia    History of cerebrovascular accident    Flaccid hemiplegia of right dominant side as late effect of cerebral infarction (CMS/HCC)    Diabetic neuropathy (CMS/HCC)     Unspecified dementia with behavioral disturbance (CMS/HCC)    Coronary artery disease    Obesity    Vitamin D deficiency    Chronic venous hypertension (idiopathic) with ulcer and inflammation of bilateral lower extremity (CMS/HCC)    Chronic obstructive pulmonary disease, unspecified (CMS/HCC)    Chronic foot ulcer (CMS/HCC)    Paroxysmal atrial fibrillation (CMS/HCC)    Acute kidney injury (CMS/HCC)    Type 2 diabetes mellitus with hyperglycemia (CMS/HCC)    CKD (chronic kidney disease) stage 3, GFR 30-59 ml/min (CMS/HCC)    Anemia of renal disease    Acute respiratory failure with hypoxia and hypercapnia (CMS/HCC)      [[[[[[[[[[[[[[[[[[[[[[[[[    Impression  ==============  -Increased shortness of breath and palpitations     -Recent acute on chronic congestive heart failure.  Compensated at this time  Recent echocardiogram showed left ventricle dysfunction with ejection fraction of 35%.     -History of right-sided weakness with left MCA territory stroke.-Improved      -status post CABG 2005. status post stent to LAD 2009    Status post stent to LAD 11/13/2015  Status post stent to mid LAD and SVG to marginal branch 09/16/2016.  Status post stent to mid LAD 5/24/2021  Status post stent to SVG to RCA 5/26/2021     Cardiac catheterization 5/24/2021 revealed  Left ventricle angiogram was not performed due to pre-existing renal dysfunction.  Left main coronary artery normal.  Left anterior descending artery has mid segment lengthy 90% disease within the previously placed stent.  Distal left into descending artery has diffuse disease.  Circumflex coronary artery is totally occluded.  (Chronic)  Right coronary artery is chronically occluded.  SVG to marginal branch (jump graft to OM1 and OM 2) is totally occluded (new finding compared to 2015.  SVG to PDA has distal radiolucency.  However no definite obstructive disease is present.       -status post myocardial infarction 2000 and 2002 .      -history of intermittent  atrial fibrillation-maintaining sinus rhythm     Transesophageal echocardiogram 2020.  Biatrial enlargement.  Smoking effect in the left atrium and left ventricle and left atrial appendage.  Mild mitral and aortic regurgitation.  Left ventricle enlargement with diffuse hypocontractility with ejection fraction of 35 to 40%.     Echocardiogram 2020 revealed left atrial enlargement left ventricle dysfunction with ejection fraction of 40%.  Negative bubble study.      -diabetes hypertension and sleep apnea in history of renal dysfunction .  Recent BUN/creatinine 38/1.36     -status post colon surgery (partial colectomy) appendectomy cholecystectomy right 4th toe removal and carpal tunnel surgery      -continued smoking 1 pack per day -abstinence from smoking      -allergy to codeine.  ============   Plan  ================  Atrial fibrillation-chronic  Rate is faster today.  Increase Coreg to 12.5 mg p.o. twice a day.  Continue amiodarone and Cardizem CD.  Observe patient's blood pressure.    Respiratory insufficiency.-Improved    Significant renal dysfunction-chronic  Patient is on dialysis now.  Likely long-term  Patient is feeling much better after dialysis was started.    Anemia  Hemoglobin 6.9.  Hemoglobin 7.7-2021  8.6-2021    Status post CABG  Patient is not having any angina pectoris    Status post stent to LAD 2021.  Status post stent to SVG to RCA 2021.       Anticoagulation  Patient is on Eliquis.  Observe for toxic effects.    Further plan will depend on patient's progress.   ]]]]]]]]]]]]]]]]]]]]]]           Jose G Rm MD  21  06:51 EDT                Electronically signed by Jose G Rm MD at 21 1018     Marcelo Álvarez MD at 21 1542          PULMONARY CRITICAL CARE Progress  NOTE      PATIENT IDENTIFICATION:  Name: Benson Mclean  MRN: [id number]  :  1950     Age: 70 y.o.  Sex: male    DATE OF Note:  2021   Referring Physician:  "Angel Prajapati MD                  Subjective:   Feeling a little better, still on -25L/80% alternating with AVAPS  No chest or abd pain, no bowel or bladder issues   Still in A-fib       Objective:  tMax 24 hrs: Temp (24hrs), Av.6 °F (36.4 °C), Min:97.3 °F (36.3 °C), Max:98.4 °F (36.9 °C)      Vitals Ranges:   Temp:  [97.3 °F (36.3 °C)-98.4 °F (36.9 °C)] 98.4 °F (36.9 °C)  Heart Rate:  [] 96  Resp:  [12-22] 16  BP: ()/(45-61) 87/51    Intake and Output Last 3 Shifts:   I/O last 3 completed shifts:  In: 590 [P.O.:240; Blood:350]  Out: 4775 [Urine:775; Other:4000]    Exam:  BP (!) 87/51   Pulse 96   Temp 98.4 °F (36.9 °C) (Oral)   Resp 16   Ht 170.2 cm (67.01\")   Wt 108 kg (238 lb 12.1 oz)   SpO2 98%   BMI 37.39 kg/m²     General Appearance:  Alert   HEENT:  Normocephalic, without obvious abnormality, Conjunctiva/corneas clear.  Normal external ear canals, Nares normal, no drainage     Neck:  Supple, symmetrical, trachea midline. No JVD.  Lungs /Chest wall: Scattered bilateral rhonchi, no wheezing, bibasilar crackles, respirations unlabored symmetrical wall movement.     Heart: Irregularly irregular, atrial fibrillation, +DEON, no rub or gallop  Abdomen: Soft, non-tender, active bowel sounds  Extremities: Trace BLE edema, no clubbing or cyanosis        Medications:    Current Facility-Administered Medications:   •  acetaminophen (TYLENOL) tablet 650 mg, 650 mg, Oral, Q4H PRN, 650 mg at 21 1713 **OR** acetaminophen (TYLENOL) 160 MG/5ML solution 650 mg, 650 mg, Oral, Q4H PRN **OR** acetaminophen (TYLENOL) suppository 650 mg, 650 mg, Rectal, Q4H PRN, Rayne Bah, FABIOLA  •  aluminum-magnesium hydroxide-simethicone (MAALOX MAX) 400-400-40 MG/5ML suspension 15 mL, 15 mL, Oral, Q6H PRN, Rayne Bah APRN  •  amiodarone (PACERONE) tablet 200 mg, 200 mg, Oral, Q12H, Jose G Rm MD, 200 mg at 21 0802  •  apixaban (ELIQUIS) tablet 5 mg, 5 mg, Oral, Q12H, Marcelo Álvarez MD, 5 mg at " 08/17/21 0802  •  aspirin chewable tablet 81 mg, 81 mg, Oral, Daily, Rayne Bah APRN, 81 mg at 08/17/21 0802  •  atorvastatin (LIPITOR) tablet 40 mg, 40 mg, Oral, Nightly, Rayne Bah APRN, 40 mg at 08/17/21 0123  •  budesonide (PULMICORT) nebulizer solution 0.5 mg, 0.5 mg, Nebulization, BID - RT, Marcelo Álvarez MD, 0.5 mg at 08/17/21 0714  •  bumetanide (BUMEX) injection 2 mg, 2 mg, Intravenous, Q12H, Yony Bhat MD, 2 mg at 08/17/21 1229  •  carvedilol (COREG) tablet 6.25 mg, 6.25 mg, Oral, BID With Meals, Jose G Rm MD, 6.25 mg at 08/17/21 0923  •  cholecalciferol (VITAMIN D3) tablet 1,000 Units, 1,000 Units, Oral, Daily, Rayne Bah APRN, 1,000 Units at 08/17/21 0802  •  dextrose (D50W) 25 g/ 50mL Intravenous Solution 25 g, 25 g, Intravenous, Q15 Min PRN, Stanton Junior MD  •  dextrose (GLUTOSE) oral gel 15 g, 15 g, Oral, Q15 Min PRN, Stanton Junior MD  •  dilTIAZem CD (CARDIZEM CD) 24 hr capsule 240 mg, 240 mg, Oral, Q24H, Jose G Rm MD, 240 mg at 08/16/21 0839  •  donepezil (ARICEPT) tablet 10 mg, 10 mg, Oral, Nightly, Rayne Bah APRN, 10 mg at 08/17/21 0123  •  DULoxetine (CYMBALTA) DR capsule 60 mg, 60 mg, Oral, Daily, Rayne Bah APRN, 60 mg at 08/17/21 0802  •  epoetin izabella-epbx (RETACRIT) injection 20,000 Units, 20,000 Units, Subcutaneous, Weekly, Yony Bhat MD, 20,000 Units at 08/13/21 2131  •  glucagon (human recombinant) (GLUCAGEN DIAGNOSTIC) injection 1 mg, 1 mg, Subcutaneous, Q15 Min PRN, Stanton Junior MD  •  heparin (porcine) injection 4,000 Units, 4,000 Units, Intracatheter, PRN, Crow Vences MD, 2,200 Units at 08/16/21 2349  •  hydrALAZINE (APRESOLINE) tablet 50 mg, 50 mg, Oral, Q12H, Rayne Bah, APRN, 50 mg at 08/16/21 0839  •  insulin lispro (ADMELOG) injection 0-14 Units, 0-14 Units, Subcutaneous, TID AC, 10 Units at 08/17/21 1229 **AND** insulin lispro (ADMELOG) injection 0-14 Units, 0-14 Units, Subcutaneous, PRN, Henri,  MD Marcelo  •  ipratropium-albuterol (DUO-NEB) nebulizer solution 3 mL, 3 mL, Nebulization, Q4H - RT, Marcelo Álvarez MD, 3 mL at 08/17/21 1526  •  isosorbide mononitrate (IMDUR) 24 hr tablet 60 mg, 60 mg, Oral, BID - Nitrates, Rayne Bah APRN, 60 mg at 08/17/21 0922  •  melatonin tablet 5 mg, 5 mg, Oral, Nightly PRN, Rayne Bah APRN  •  metOLazone (ZAROXOLYN) tablet 5 mg, 5 mg, Oral, Daily, Katerina Beyer MD, 5 mg at 08/17/21 0923  •  nitroglycerin (NITROSTAT) SL tablet 0.4 mg, 0.4 mg, Sublingual, Q5 Min PRN, Rayne Bah APRN  •  ondansetron (ZOFRAN) tablet 4 mg, 4 mg, Oral, Q6H PRN **OR** ondansetron (ZOFRAN) injection 4 mg, 4 mg, Intravenous, Q6H PRN, Rayne Bah APRN, 4 mg at 08/15/21 0510  •  potassium phosphate 40 mmol in sodium chloride 0.9 % 250 mL infusion, 40 mmol, Intravenous, Once, Yony Bhat MD  •  [COMPLETED] Insert peripheral IV, , , Once **AND** sodium chloride 0.9 % flush 10 mL, 10 mL, Intravenous, PRN, Davis Zavala MD  •  sodium chloride 0.9 % flush 10 mL, 10 mL, Intravenous, Q12H, Rayne Bah APRN, 10 mL at 08/17/21 0954  •  sodium chloride 0.9 % flush 10 mL, 10 mL, Intravenous, PRN, Rayne Bah APRN  •  vitamin B-12 (CYANOCOBALAMIN) tablet 1,000 mcg, 1,000 mcg, Oral, Daily, Rayne Bah APRN, 1,000 mcg at 08/17/21 0802    Data Review:  All labs (24hrs):   Recent Results (from the past 24 hour(s))   POC Glucose Once    Collection Time: 08/16/21  4:24 PM    Specimen: Blood   Result Value Ref Range    Glucose 158 (H) 70 - 105 mg/dL   POC Glucose Once    Collection Time: 08/16/21  8:37 PM    Specimen: Blood   Result Value Ref Range    Glucose 219 (H) 70 - 105 mg/dL   Prepare RBC, 1 Units    Collection Time: 08/17/21  2:00 AM   Result Value Ref Range    Product Code A0406T93     Unit Number [id number]     UNIT  ABO A     UNIT  RH POS     Crossmatch Interpretation Compatible     Dispense Status PT     Blood Expiration Date [id number]     Blood Type  [facility] 6200    Renal Function Panel    Collection Time: 08/17/21  5:38 AM    Specimen: Blood   Result Value Ref Range    Glucose 156 (H) 65 - 99 mg/dL    BUN 35 (H) 8 - 23 mg/dL    Creatinine 1.77 (H) 0.76 - 1.27 mg/dL    Sodium 134 (L) 136 - 145 mmol/L    Potassium 3.2 (L) 3.5 - 5.2 mmol/L    Chloride 97 (L) 98 - 107 mmol/L    CO2 28.0 22.0 - 29.0 mmol/L    Calcium 7.4 (L) 8.6 - 10.5 mg/dL    Albumin 3.10 (L) 3.50 - 5.20 g/dL    Phosphorus 2.0 (L) 2.5 - 4.5 mg/dL    Anion Gap 9.0 5.0 - 15.0 mmol/L    BUN/Creatinine Ratio 19.8 7.0 - 25.0    eGFR Non African Amer 38 (L) >60 mL/min/1.73   CBC Auto Differential    Collection Time: 08/17/21  5:38 AM    Specimen: Blood   Result Value Ref Range    WBC 7.60 3.40 - 10.80 10*3/mm3    RBC 2.73 (L) 4.14 - 5.80 10*6/mm3    Hemoglobin 7.7 (L) 13.0 - 17.7 g/dL    Hematocrit 23.6 (L) 37.5 - 51.0 %    MCV 86.3 79.0 - 97.0 fL    MCH 28.1 26.6 - 33.0 pg    MCHC 32.5 31.5 - 35.7 g/dL    RDW 16.3 (H) 12.3 - 15.4 %    RDW-SD 49.9 37.0 - 54.0 fl    MPV 7.2 6.0 - 12.0 fL    Platelets 314 140 - 450 10*3/mm3    Neutrophil % 59.8 42.7 - 76.0 %    Lymphocyte % 21.8 19.6 - 45.3 %    Monocyte % 10.7 5.0 - 12.0 %    Eosinophil % 7.1 (H) 0.3 - 6.2 %    Basophil % 0.6 0.0 - 1.5 %    Neutrophils, Absolute 4.60 1.70 - 7.00 10*3/mm3    Lymphocytes, Absolute 1.70 0.70 - 3.10 10*3/mm3    Monocytes, Absolute 0.80 0.10 - 0.90 10*3/mm3    Eosinophils, Absolute 0.50 (H) 0.00 - 0.40 10*3/mm3    Basophils, Absolute 0.00 0.00 - 0.20 10*3/mm3    nRBC 0.0 0.0 - 0.2 /100 WBC   POC Glucose Once    Collection Time: 08/17/21  7:29 AM    Specimen: Blood   Result Value Ref Range    Glucose 175 (H) 70 - 105 mg/dL   POC Glucose Once    Collection Time: 08/17/21 11:06 AM    Specimen: Blood   Result Value Ref Range    Glucose 300 (H) 70 - 105 mg/dL        Imaging:  XR Chest 1 View  Narrative: DATE OF EXAM:  8/17/2021 4:50 AM     PROCEDURE:  XR CHEST 1 VW-     INDICATIONS:  Shortness of breath; R00.0-Tachycardia,  unspecified; R06.00-Dyspnea,  unspecified     COMPARISON:  08/16/2021     TECHNIQUE:   Single radiographic AP view of the chest was obtained.     FINDINGS:  Cardiac size remains normal. There are postoperative changes of prior  CABG. Right IJ line terminates in the SVC. There is continued pulmonary  vascular congestion and evidence of interstitial edema. There are  bilateral dependent pleural effusions with airspace disease in both lung  bases.      Impression:    1. Continued findings of congestive heart failure and interstitial  pulmonary edema.  2. Bilateral dependent pleural effusions. Bibasilar airspace disease.     Electronically Signed By-Oscar Casper MD On:8/17/2021 7:24 AM  This report was finalized on [id number] by  Oscar Casper MD.       ASSESSMENT:  Pneumonia  COPD  Bilateral pleural effusion  Atrial fibrillation with RVR   Acute on chronic systolic heart failure  CAD S/P CABG   HTN  Elevated troponin  ANNETTE uses BiPAP  Hyperlipidemia  Flaccid hemiplegia of right dominant side as late effect of cerebral infarction   DM II with neuropathy   Dementia with behavioral disturbance  Chronic venous hypertension (idiopathic) with ulcer/inflammation of BLE  Chronic foot ulcer   CKD   Tobacco use disorder   Anemia      PLAN:  Encourage OOB daily   O2 support titrated to maintain a saturation of 88 to 91%  Currently alternating precision flow with AVAPS as tolerated  Continue anticoagulation for now  Nephrology following closely, plan for HD today  Volume management per nephrology  Bronchodilator  Inhaled corticosteroids  Electrolytes/ glycemic control  DVT and GI prophylaxis.  Nutrition: P.o. diet and supplementation with boost.  Small amounts of intake as tolerated by saturations/O2 needs    Discussed with Dr Henri Gamez, APRN    8/17/2021  15:42 EDT         I personally have examined  and interviewed the patient. I have reviewed the history, data, problems, assessment and plan with our NP.  Critical  care time in direct medical management (   ) minutes  Electronically signed by Marcelo Álvarez MD, D,ABSM, 08/17/21, 9:39 PM EDT.         Electronically signed by Marcelo Álvarez MD at 08/17/21 3545     Katerina Beyer MD at 08/17/21 5138              Subjective  :the pt. feeling better report he can breathe better now he had dialysis yesterday with uFR of 4 L    Vital Signs  Vitals:    08/17/21 1230   BP: 94/55   Pulse: 66   Resp:    Temp:    SpO2: 95%     I/O this shift:  In: 140 [P.O.:140]  Out: 300 [Urine:300]  I/O last 3 completed shifts:  In: 590 [P.O.:240; Blood:350]  Out: 4775 [Urine:775; Other:4000]      Physical Exam:    General: In no acute distress  HEENT:  Normocephalic, Atraumatic, EOMI, PERRLA, NO icterus,  Neck:  Supple, No JVD, No Carotid artery bruit, No lymphadenopathy  Chest: Clear to auscultation bilaterally  Cardiovascular: Regular rate and rhythm. Positive S1 & S2, No rub, murmur or gallop.  Abdominal: Soft, nontender, no palpable organomegaly, no abdominal bruit, positive bowel sounds  Musculoskeletal: No joint tenderness or swelling, good range of motion, Adequate muscle strength on both sides, no cyanosis, clubbing or edema on lower extremities.  Neuro: Alert oriented x3, CN1 - 12 intact, No focal sensory or motor deficit.      Review of Systems:   CNS: Denied headaches, blurred vision, tingling or numbness in any part of body. No weakness or any impaired speech.  CVS: No chest pain or shortness of breath, No orthopnea or PND or exertional dyspnea,  Pulmonary: Denies shortness of breath, coughs, hematemesis, night sweats or weight loss.  GI: Denies diarrhea, nausea, vomiting. Denies weight loss, hematemesis, melena.  : Denies dysuria, frequency or hesitancy of urination  Vascular: Denies claudication, resting pain, tingling numbness or weakness in any part of the body.  Musculoskeletal: Denies Joint tenderness or pain, denies stiffness in joints, denies fever or weight loss, or skin  darren.    Current Labs  [unfilled]  Lab Results (last 24 hours)     Procedure Component Value Units Date/Time    POC Glucose Once [[id number]]  (Abnormal) Collected: 08/17/21 1106    Specimen: Blood Updated: 08/17/21 1108     Glucose 300 mg/dL      Comment: Serial Number: [id number]:  [id number]       POC Glucose Once [[id number]]  (Abnormal) Collected: 08/17/21 0729    Specimen: Blood Updated: 08/17/21 0729     Glucose 175 mg/dL      Comment: Serial Number: [id number]:  [id number]       Renal Function Panel [[id number]]  (Abnormal) Collected: 08/17/21 0538    Specimen: Blood Updated: 08/17/21 0623     Glucose 156 mg/dL      BUN 35 mg/dL      Creatinine 1.77 mg/dL      Sodium 134 mmol/L      Potassium 3.2 mmol/L      Chloride 97 mmol/L      CO2 28.0 mmol/L      Calcium 7.4 mg/dL      Albumin 3.10 g/dL      Phosphorus 2.0 mg/dL      Anion Gap 9.0 mmol/L      BUN/Creatinine Ratio 19.8     eGFR Non African Amer 38 mL/min/1.73     Narrative:      GFR Normal >60  Chronic Kidney Disease <60  Kidney Failure <15      CBC & Differential [[id number]]  (Abnormal) Collected: 08/17/21 0538    Specimen: Blood Updated: 08/17/21 0548    Narrative:      The following orders were created for panel order CBC & Differential.  Procedure                               Abnormality         Status                     ---------                               -----------         ------                     CBC Auto Differential[[id number]]        Abnormal            Final result                 Please view results for these tests on the individual orders.    CBC Auto Differential [[id number]]  (Abnormal) Collected: 08/17/21 0538    Specimen: Blood Updated: 08/17/21 0548     WBC 7.60 10*3/mm3      RBC 2.73 10*6/mm3      Hemoglobin 7.7 g/dL      Hematocrit 23.6 %      MCV 86.3 fL      MCH 28.1 pg      MCHC 32.5 g/dL      RDW 16.3 %      RDW-SD 49.9 fl      MPV 7.2 fL      Platelets 314 10*3/mm3      Neutrophil % 59.8 %      Lymphocyte % 21.8  %      Monocyte % 10.7 %      Eosinophil % 7.1 %      Basophil % 0.6 %      Neutrophils, Absolute 4.60 10*3/mm3      Lymphocytes, Absolute 1.70 10*3/mm3      Monocytes, Absolute 0.80 10*3/mm3      Eosinophils, Absolute 0.50 10*3/mm3      Basophils, Absolute 0.00 10*3/mm3      nRBC 0.0 /100 WBC     POC Glucose Once [[id number]]  (Abnormal) Collected: 08/16/21 2037    Specimen: Blood Updated: 08/16/21 2038     Glucose 219 mg/dL      Comment: Serial Number: [id number]:  [id number]           Current Radiology  Imaging Results (Last 24 Hours)     Procedure Component Value Units Date/Time    XR Chest 1 View [[id number]] Collected: 08/17/21 0724     Updated: 08/17/21 0727    Narrative:      DATE OF EXAM:  8/17/2021 4:50 AM     PROCEDURE:  XR CHEST 1 VW-     INDICATIONS:  Shortness of breath; R00.0-Tachycardia, unspecified; R06.00-Dyspnea,  unspecified     COMPARISON:  08/16/2021     TECHNIQUE:   Single radiographic AP view of the chest was obtained.     FINDINGS:  Cardiac size remains normal. There are postoperative changes of prior  CABG. Right IJ line terminates in the SVC. There is continued pulmonary  vascular congestion and evidence of interstitial edema. There are  bilateral dependent pleural effusions with airspace disease in both lung  bases.        Impression:         1. Continued findings of congestive heart failure and interstitial  pulmonary edema.  2. Bilateral dependent pleural effusions. Bibasilar airspace disease.     Electronically Signed By-Oscar Casper MD On:8/17/2021 7:24 AM  This report was finalized on [id number] by  Oscar Casper MD.        Current Meds    Current Facility-Administered Medications:   •  acetaminophen (TYLENOL) tablet 650 mg, 650 mg, Oral, Q4H PRN, 650 mg at 08/09/21 1713 **OR** acetaminophen (TYLENOL) 160 MG/5ML solution 650 mg, 650 mg, Oral, Q4H PRN **OR** acetaminophen (TYLENOL) suppository 650 mg, 650 mg, Rectal, Q4H PRN, Rayne Bah APRN  •  aluminum-magnesium  hydroxide-simethicone (MAALOX MAX) 400-400-40 MG/5ML suspension 15 mL, 15 mL, Oral, Q6H PRN, Rayne Bah APRN  •  amiodarone (PACERONE) tablet 200 mg, 200 mg, Oral, Q12H, Jose G Rm MD, 200 mg at 08/17/21 0802  •  apixaban (ELIQUIS) tablet 5 mg, 5 mg, Oral, Q12H, Marcelo Álvarez MD, 5 mg at 08/17/21 0802  •  aspirin chewable tablet 81 mg, 81 mg, Oral, Daily, Rayne Bah APRN, 81 mg at 08/17/21 0802  •  atorvastatin (LIPITOR) tablet 40 mg, 40 mg, Oral, Nightly, Rayne Bah APRN, 40 mg at 08/17/21 0123  •  budesonide (PULMICORT) nebulizer solution 0.5 mg, 0.5 mg, Nebulization, BID - RT, Marcelo Álvarez MD, 0.5 mg at 08/17/21 0714  •  bumetanide (BUMEX) injection 2 mg, 2 mg, Intravenous, Q12H, Yony Bhat MD, 2 mg at 08/17/21 1229  •  carvedilol (COREG) tablet 6.25 mg, 6.25 mg, Oral, BID With Meals, Jose G Rm MD, 6.25 mg at 08/17/21 0923  •  cholecalciferol (VITAMIN D3) tablet 1,000 Units, 1,000 Units, Oral, Daily, Rayne Bah APRN, 1,000 Units at 08/17/21 0802  •  dextrose (D50W) 25 g/ 50mL Intravenous Solution 25 g, 25 g, Intravenous, Q15 Min PRN, Stanton Junior MD  •  dextrose (GLUTOSE) oral gel 15 g, 15 g, Oral, Q15 Min PRN, Stanton Junior MD  •  dilTIAZem CD (CARDIZEM CD) 24 hr capsule 240 mg, 240 mg, Oral, Q24H, Jose G Rm MD, 240 mg at 08/16/21 0839  •  donepezil (ARICEPT) tablet 10 mg, 10 mg, Oral, Nightly, Rayne Bah, APRN, 10 mg at 08/17/21 0123  •  DULoxetine (CYMBALTA) DR capsule 60 mg, 60 mg, Oral, Daily, Rayne Bah, APRN, 60 mg at 08/17/21 0802  •  epoetin izabella-epbx (RETACRIT) injection 20,000 Units, 20,000 Units, Subcutaneous, Weekly, Yony Bhat MD, 20,000 Units at 08/13/21 2131  •  glucagon (human recombinant) (GLUCAGEN DIAGNOSTIC) injection 1 mg, 1 mg, Subcutaneous, Q15 Min PRN, Stanton Junior MD  •  heparin (porcine) injection 4,000 Units, 4,000 Units, Intracatheter, PRN, Crow Vences MD, 2,200 Units at 08/16/21 8243  •   hydrALAZINE (APRESOLINE) tablet 50 mg, 50 mg, Oral, Q12H, Rayne Bah APRN, 50 mg at 08/16/21 0839  •  insulin lispro (ADMELOG) injection 0-14 Units, 0-14 Units, Subcutaneous, TID AC, 10 Units at 08/17/21 1229 **AND** insulin lispro (ADMELOG) injection 0-14 Units, 0-14 Units, Subcutaneous, PRN, Marcelo Álvarez MD  •  ipratropium-albuterol (DUO-NEB) nebulizer solution 3 mL, 3 mL, Nebulization, Q4H - RT, Marcelo Álvarez MD, 3 mL at 08/17/21 1124  •  isosorbide mononitrate (IMDUR) 24 hr tablet 60 mg, 60 mg, Oral, BID - Nitrates, Rayne Bah APRN, 60 mg at 08/17/21 0922  •  melatonin tablet 5 mg, 5 mg, Oral, Nightly PRN, Rayne Bah APRN  •  metOLazone (ZAROXOLYN) tablet 5 mg, 5 mg, Oral, Daily, Katerina Beyer MD, 5 mg at 08/17/21 0923  •  nitroglycerin (NITROSTAT) SL tablet 0.4 mg, 0.4 mg, Sublingual, Q5 Min PRN, Rayne Bah APRN  •  ondansetron (ZOFRAN) tablet 4 mg, 4 mg, Oral, Q6H PRN **OR** ondansetron (ZOFRAN) injection 4 mg, 4 mg, Intravenous, Q6H PRN, Rayne Bah APRN, 4 mg at 08/15/21 0510  •  [COMPLETED] Insert peripheral IV, , , Once **AND** sodium chloride 0.9 % flush 10 mL, 10 mL, Intravenous, PRN, Davis Zavala MD  •  sodium chloride 0.9 % flush 10 mL, 10 mL, Intravenous, Q12H, Rayne Bah APRN, 10 mL at 08/17/21 0954  •  sodium chloride 0.9 % flush 10 mL, 10 mL, Intravenous, PRN, Rayne Bah APRN  •  vitamin B-12 (CYANOCOBALAMIN) tablet 1,000 mcg, 1,000 mcg, Oral, Daily, Rayne Bah APRN, 1,000 mcg at 08/17/21 0802          Assessment/Plan    Patient Active Problem List   Diagnosis   • S/P CABG (coronary artery bypass graft)   • Essential hypertension   • Elevated troponin   • Closed fracture of seventh thoracic vertebra (CMS/HCC)   • Status post coronary artery stent placement   • ANNETTE treated with BiPAP   • Major depressive disorder, recurrent, mild (CMS/HCC)   • Lymphadenopathy, mediastinal   • Mixed hyperlipidemia   • History of cerebrovascular accident   • History of  amputation of lesser toe (CMS/HCC)   • Flaccid hemiplegia of right dominant side as late effect of cerebral infarction (CMS/HCC)   • Diabetic neuropathy (CMS/HCC)   • Unspecified dementia with behavioral disturbance (CMS/HCC)   • Coronary artery disease   • Constipation   • Obesity   • Vitamin D deficiency   • Chronic venous hypertension (idiopathic) with ulcer and inflammation of bilateral lower extremity (CMS/HCC)   • Chronic obstructive pulmonary disease, unspecified (CMS/HCC)   • Chronic foot ulcer (CMS/HCC)   • Chronic left-sided low back pain without sciatica   • Paroxysmal atrial fibrillation (CMS/HCC)   • Arthritis   • Acute kidney injury (CMS/HCC)   • Type 2 diabetes mellitus with hyperglycemia (CMS/HCC)   • Abnormal nuclear stress test   • CKD (chronic kidney disease) stage 3, GFR 30-59 ml/min (CMS/HCC)   • Acute on chronic systolic heart failure (CMS/HCC)   • Anemia of renal disease   • Acute respiratory failure with hypoxia and hypercapnia (CMS/HCC)   Acute on chronic renal failure with pulmonary edema initiated on dialysis improved oxygenation, will do another dialysis tomorrow, will watch for residual renal function to determine outpatient dialysis need and tunneled cath placement        Katerina Beyer MD FACP, FASN.  08/17/21  13:32 EDT        Electronically signed by Katerina Beyer MD at 08/17/21 1338       PACS Images     Radiology Images   Study Result    Narrative & Impression   DATE OF EXAM:  8/17/2021 4:50 AM     PROCEDURE:  XR CHEST 1 VW-     INDICATIONS:  Shortness of breath; R00.0-Tachycardia, unspecified; R06.00-Dyspnea,  unspecified     COMPARISON:  08/16/2021     TECHNIQUE:   Single radiographic AP view of the chest was obtained.     FINDINGS:  Cardiac size remains normal. There are postoperative changes of prior  CABG. Right IJ line terminates in the SVC. There is continued pulmonary  vascular congestion and evidence of interstitial edema. There are  bilateral dependent pleural effusions  with airspace disease in both lung  bases.      IMPRESSION:     1. Continued findings of congestive heart failure and interstitial  pulmonary edema.  2. Bilateral dependent pleural effusions. Bibasilar airspace disease.     Electronically Signed By-Oscar Casper MD On:8/17/2021 7:24 AM  This report was finalized on [id number] by  Oscar Casper MD.     Hepatitis B Surface Antigen  Status:  Final result  Component   Ref Range & Units 08/14/21 2035   Hepatitis B Surface Ag   Non-Reactive Non-Reactive    Resulting Agency [facility]        Specimen Collected: 08/14/21 2035 Last Resulted: 08/14/21 2117

## 2021-08-18 NOTE — PLAN OF CARE
Goal Outcome Evaluation:  Plan of Care Reviewed With: patient        Progress: improving     Patient resting abed, awaiting hemodialysis at bedside, discharge pending kidney function and rehab placement, oxygen has been wean to 7 liters high flow at this time, will continue to monitor.

## 2021-08-18 NOTE — PLAN OF CARE
Problem: Fall Injury Risk  Goal: Absence of Fall and Fall-Related Injury  Intervention: Identify and Manage Contributors to Fall Injury Risk  Recent Flowsheet Documentation  Taken 8/17/2021 2000 by Meme Chavez RN  Medication Review/Management: medications reviewed  Intervention: Promote Injury-Free Environment  Recent Flowsheet Documentation  Taken 8/18/2021 0309 by Meme Chavez RN  Safety Promotion/Fall Prevention:   safety round/check completed   room organization consistent   nonskid shoes/slippers when out of bed   activity supervised   assistive device/personal items within reach   clutter free environment maintained   elopement precautions   fall prevention program maintained  Taken 8/18/2021 0000 by Meme Chavez RN  Safety Promotion/Fall Prevention:   safety round/check completed   room organization consistent   nonskid shoes/slippers when out of bed   activity supervised   assistive device/personal items within reach   clutter free environment maintained   elopement precautions   fall prevention program maintained  Taken 8/17/2021 2000 by Meme Chavez RN  Safety Promotion/Fall Prevention:   safety round/check completed   room organization consistent   nonskid shoes/slippers when out of bed   activity supervised   assistive device/personal items within reach   clutter free environment maintained   elopement precautions   fall prevention program maintained     Problem: Adult Inpatient Plan of Care  Goal: Absence of Hospital-Acquired Illness or Injury  Intervention: Identify and Manage Fall Risk  Recent Flowsheet Documentation  Taken 8/18/2021 0309 by Meme Chavez RN  Safety Promotion/Fall Prevention:   safety round/check completed   room organization consistent   nonskid shoes/slippers when out of bed   activity supervised   assistive device/personal items within reach   clutter free environment maintained   elopement precautions   fall prevention program maintained  Taken 8/18/2021  0000 by Meme Chavez RN  Safety Promotion/Fall Prevention:   safety round/check completed   room organization consistent   nonskid shoes/slippers when out of bed   activity supervised   assistive device/personal items within reach   clutter free environment maintained   elopement precautions   fall prevention program maintained  Taken 8/17/2021 2000 by Meme Chavez RN  Safety Promotion/Fall Prevention:   safety round/check completed   room organization consistent   nonskid shoes/slippers when out of bed   activity supervised   assistive device/personal items within reach   clutter free environment maintained   elopement precautions   fall prevention program maintained  Intervention: Prevent Skin Injury  Recent Flowsheet Documentation  Taken 8/18/2021 0309 by Meme Chavez RN  Skin Protection:   adhesive use limited   incontinence pads utilized  Taken 8/18/2021 0000 by Meme Chavez RN  Skin Protection:   adhesive use limited   incontinence pads utilized  Taken 8/17/2021 2000 by Meme Chavez RN  Skin Protection:   adhesive use limited   incontinence pads utilized  Goal: Optimal Comfort and Wellbeing  Intervention: Provide Person-Centered Care  Recent Flowsheet Documentation  Taken 8/18/2021 0309 by Meme Chavez RN  Trust Relationship/Rapport:   care explained   questions answered   questions encouraged   thoughts/feelings acknowledged  Taken 8/18/2021 0000 by Meme Chavez RN  Trust Relationship/Rapport:   care explained   questions answered   questions encouraged   thoughts/feelings acknowledged  Taken 8/17/2021 2000 by Meme Chavez RN  Trust Relationship/Rapport:   care explained   questions answered   questions encouraged   thoughts/feelings acknowledged     Problem: Skin Injury Risk Increased  Goal: Skin Health and Integrity  Intervention: Optimize Skin Protection  Recent Flowsheet Documentation  Taken 8/18/2021 0309 by Meme Chavez RN  Pressure Reduction Techniques:    frequent weight shift encouraged   weight shift assistance provided  Head of Bed (HOB): HOB elevated  Pressure Reduction Devices: pressure-redistributing mattress utilized  Skin Protection:   adhesive use limited   incontinence pads utilized  Taken 8/18/2021 0000 by Meme Chavez RN  Pressure Reduction Techniques:   frequent weight shift encouraged   weight shift assistance provided  Head of Bed (HOB): HOB elevated  Pressure Reduction Devices: pressure-redistributing mattress utilized  Skin Protection:   adhesive use limited   incontinence pads utilized  Taken 8/17/2021 2000 by Meme Chavez RN  Pressure Reduction Techniques:   frequent weight shift encouraged   weight shift assistance provided  Head of Bed (HOB): HOB elevated  Pressure Reduction Devices: pressure-redistributing mattress utilized  Skin Protection:   adhesive use limited   incontinence pads utilized     Problem: Breathing Pattern Ineffective  Goal: Effective Breathing Pattern  Intervention: Promote Improved Breathing Pattern  Recent Flowsheet Documentation  Taken 8/18/2021 0309 by Meme Chavez RN  Supportive Measures: active listening utilized  Head of Bed (HOB): HOB elevated  Taken 8/18/2021 0000 by Meme Chavze RN  Supportive Measures: active listening utilized  Head of Bed (HOB): HOB elevated  Taken 8/17/2021 2000 by Meme Chavez RN  Supportive Measures: active listening utilized  Head of Bed (HOB): HOB elevated     Problem: Fluid Volume Excess  Goal: Fluid Balance  Intervention: Monitor and Manage Hypervolemia  Recent Flowsheet Documentation  Taken 8/18/2021 0309 by Meme Chavez RN  Skin Protection:   adhesive use limited   incontinence pads utilized  Taken 8/18/2021 0000 by Meme Chavez RN  Skin Protection:   adhesive use limited   incontinence pads utilized  Taken 8/17/2021 2000 by Meme Chavez RN  Skin Protection:   adhesive use limited   incontinence pads utilized   Goal Outcome Evaluation:

## 2021-08-19 LAB
ALBUMIN SERPL-MCNC: 3.9 G/DL (ref 3.5–5.2)
ANION GAP SERPL CALCULATED.3IONS-SCNC: 9 MMOL/L (ref 5–15)
BASOPHILS # BLD AUTO: 0.1 10*3/MM3 (ref 0–0.2)
BASOPHILS NFR BLD AUTO: 1.2 % (ref 0–1.5)
BUN SERPL-MCNC: 23 MG/DL (ref 8–23)
BUN/CREAT SERPL: 15.3 (ref 7–25)
CALCIUM SPEC-SCNC: 8.6 MG/DL (ref 8.6–10.5)
CHLORIDE SERPL-SCNC: 94 MMOL/L (ref 98–107)
CO2 SERPL-SCNC: 31 MMOL/L (ref 22–29)
CREAT SERPL-MCNC: 1.5 MG/DL (ref 0.76–1.27)
DEPRECATED RDW RBC AUTO: 52.9 FL (ref 37–54)
EOSINOPHIL # BLD AUTO: 0.6 10*3/MM3 (ref 0–0.4)
EOSINOPHIL NFR BLD AUTO: 5.3 % (ref 0.3–6.2)
ERYTHROCYTE [DISTWIDTH] IN BLOOD BY AUTOMATED COUNT: 17.3 % (ref 12.3–15.4)
GFR SERPL CREATININE-BSD FRML MDRD: 46 ML/MIN/1.73
GLUCOSE BLDC GLUCOMTR-MCNC: 145 MG/DL (ref 70–105)
GLUCOSE BLDC GLUCOMTR-MCNC: 206 MG/DL (ref 70–105)
GLUCOSE BLDC GLUCOMTR-MCNC: 236 MG/DL (ref 70–105)
GLUCOSE BLDC GLUCOMTR-MCNC: 253 MG/DL (ref 70–105)
GLUCOSE SERPL-MCNC: 198 MG/DL (ref 65–99)
HBV CORE AB SERPL QL IA: NEGATIVE
HCT VFR BLD AUTO: 27.7 % (ref 37.5–51)
HGB BLD-MCNC: 9.4 G/DL (ref 13–17.7)
LYMPHOCYTES # BLD AUTO: 1.8 10*3/MM3 (ref 0.7–3.1)
LYMPHOCYTES NFR BLD AUTO: 17.2 % (ref 19.6–45.3)
MCH RBC QN AUTO: 29.9 PG (ref 26.6–33)
MCHC RBC AUTO-ENTMCNC: 34.1 G/DL (ref 31.5–35.7)
MCV RBC AUTO: 87.6 FL (ref 79–97)
MONOCYTES # BLD AUTO: 1.1 10*3/MM3 (ref 0.1–0.9)
MONOCYTES NFR BLD AUTO: 10.2 % (ref 5–12)
NEUTROPHILS NFR BLD AUTO: 66.1 % (ref 42.7–76)
NEUTROPHILS NFR BLD AUTO: 7.1 10*3/MM3 (ref 1.7–7)
NRBC BLD AUTO-RTO: 0.1 /100 WBC (ref 0–0.2)
PHOSPHATE SERPL-MCNC: 2.4 MG/DL (ref 2.5–4.5)
PLATELET # BLD AUTO: 429 10*3/MM3 (ref 140–450)
PMV BLD AUTO: 8.2 FL (ref 6–12)
POTASSIUM SERPL-SCNC: 3.7 MMOL/L (ref 3.5–5.2)
RBC # BLD AUTO: 3.16 10*6/MM3 (ref 4.14–5.8)
SODIUM SERPL-SCNC: 134 MMOL/L (ref 136–145)
WBC # BLD AUTO: 10.7 10*3/MM3 (ref 3.4–10.8)

## 2021-08-19 PROCEDURE — 63710000001 INSULIN LISPRO (HUMAN) PER 5 UNITS: Performed by: INTERNAL MEDICINE

## 2021-08-19 PROCEDURE — P9047 ALBUMIN (HUMAN), 25%, 50ML: HCPCS | Performed by: INTERNAL MEDICINE

## 2021-08-19 PROCEDURE — 25010000002 ALBUMIN HUMAN 25% PER 50 ML: Performed by: INTERNAL MEDICINE

## 2021-08-19 PROCEDURE — 5A1D70Z PERFORMANCE OF URINARY FILTRATION, INTERMITTENT, LESS THAN 6 HOURS PER DAY: ICD-10-PCS | Performed by: INTERNAL MEDICINE

## 2021-08-19 PROCEDURE — 80069 RENAL FUNCTION PANEL: CPT | Performed by: INTERNAL MEDICINE

## 2021-08-19 PROCEDURE — 99232 SBSQ HOSP IP/OBS MODERATE 35: CPT | Performed by: INTERNAL MEDICINE

## 2021-08-19 PROCEDURE — 94799 UNLISTED PULMONARY SVC/PX: CPT

## 2021-08-19 PROCEDURE — 99232 SBSQ HOSP IP/OBS MODERATE 35: CPT | Performed by: HOSPITALIST

## 2021-08-19 PROCEDURE — 85025 COMPLETE CBC W/AUTO DIFF WBC: CPT | Performed by: INTERNAL MEDICINE

## 2021-08-19 PROCEDURE — 94660 CPAP INITIATION&MGMT: CPT

## 2021-08-19 PROCEDURE — 97530 THERAPEUTIC ACTIVITIES: CPT

## 2021-08-19 PROCEDURE — 63710000001 INSULIN LISPRO (HUMAN) PER 5 UNITS: Performed by: HOSPITALIST

## 2021-08-19 PROCEDURE — 82962 GLUCOSE BLOOD TEST: CPT

## 2021-08-19 PROCEDURE — 25010000002 HEPARIN (PORCINE) PER 1000 UNITS: Performed by: INTERNAL MEDICINE

## 2021-08-19 PROCEDURE — 97116 GAIT TRAINING THERAPY: CPT

## 2021-08-19 RX ORDER — INSULIN LISPRO 100 [IU]/ML
0-24 INJECTION, SOLUTION INTRAVENOUS; SUBCUTANEOUS AS NEEDED
Status: DISCONTINUED | OUTPATIENT
Start: 2021-08-19 | End: 2021-08-25 | Stop reason: HOSPADM

## 2021-08-19 RX ORDER — INSULIN LISPRO 100 [IU]/ML
0-24 INJECTION, SOLUTION INTRAVENOUS; SUBCUTANEOUS
Status: DISCONTINUED | OUTPATIENT
Start: 2021-08-19 | End: 2021-08-25 | Stop reason: HOSPADM

## 2021-08-19 RX ADMIN — AMIODARONE HYDROCHLORIDE 200 MG: 200 TABLET ORAL at 09:17

## 2021-08-19 RX ADMIN — Medication 10 ML: at 20:12

## 2021-08-19 RX ADMIN — CYANOCOBALAMIN TAB 1000 MCG 1000 MCG: 1000 TAB at 09:18

## 2021-08-19 RX ADMIN — IPRATROPIUM BROMIDE AND ALBUTEROL SULFATE 3 ML: 2.5; .5 SOLUTION RESPIRATORY (INHALATION) at 10:22

## 2021-08-19 RX ADMIN — DILTIAZEM HYDROCHLORIDE 240 MG: 240 CAPSULE, COATED, EXTENDED RELEASE ORAL at 09:17

## 2021-08-19 RX ADMIN — HEPARIN SODIUM 4000 UNITS: 1000 INJECTION INTRAVENOUS; SUBCUTANEOUS at 05:00

## 2021-08-19 RX ADMIN — BUMETANIDE 2 MG: 0.25 INJECTION INTRAMUSCULAR; INTRAVENOUS at 22:25

## 2021-08-19 RX ADMIN — IPRATROPIUM BROMIDE AND ALBUTEROL SULFATE 3 ML: 2.5; .5 SOLUTION RESPIRATORY (INHALATION) at 07:09

## 2021-08-19 RX ADMIN — DULOXETINE 60 MG: 30 CAPSULE, DELAYED RELEASE ORAL at 09:17

## 2021-08-19 RX ADMIN — IPRATROPIUM BROMIDE AND ALBUTEROL SULFATE 3 ML: 2.5; .5 SOLUTION RESPIRATORY (INHALATION) at 18:40

## 2021-08-19 RX ADMIN — INSULIN LISPRO 5 UNITS: 100 INJECTION, SOLUTION INTRAVENOUS; SUBCUTANEOUS at 09:16

## 2021-08-19 RX ADMIN — BUDESONIDE 0.5 MG: 0.5 SUSPENSION RESPIRATORY (INHALATION) at 07:09

## 2021-08-19 RX ADMIN — IPRATROPIUM BROMIDE AND ALBUTEROL SULFATE 3 ML: 2.5; .5 SOLUTION RESPIRATORY (INHALATION) at 14:40

## 2021-08-19 RX ADMIN — METOLAZONE 5 MG: 5 TABLET ORAL at 09:17

## 2021-08-19 RX ADMIN — ATORVASTATIN CALCIUM 40 MG: 40 TABLET, FILM COATED ORAL at 20:14

## 2021-08-19 RX ADMIN — INSULIN LISPRO 8 UNITS: 100 INJECTION, SOLUTION INTRAVENOUS; SUBCUTANEOUS at 17:00

## 2021-08-19 RX ADMIN — APIXABAN 5 MG: 5 TABLET, FILM COATED ORAL at 20:14

## 2021-08-19 RX ADMIN — Medication 1000 UNITS: at 09:17

## 2021-08-19 RX ADMIN — ALBUMIN HUMAN 12.5 G: 0.25 SOLUTION INTRAVENOUS at 03:07

## 2021-08-19 RX ADMIN — INSULIN LISPRO 8 UNITS: 100 INJECTION, SOLUTION INTRAVENOUS; SUBCUTANEOUS at 12:53

## 2021-08-19 RX ADMIN — DONEPEZIL HYDROCHLORIDE 10 MG: 5 TABLET, FILM COATED ORAL at 20:14

## 2021-08-19 RX ADMIN — AMIODARONE HYDROCHLORIDE 200 MG: 200 TABLET ORAL at 20:14

## 2021-08-19 RX ADMIN — APIXABAN 5 MG: 5 TABLET, FILM COATED ORAL at 09:18

## 2021-08-19 RX ADMIN — BUDESONIDE 0.5 MG: 0.5 SUSPENSION RESPIRATORY (INHALATION) at 18:40

## 2021-08-19 RX ADMIN — Medication 10 ML: at 09:16

## 2021-08-19 RX ADMIN — ASPIRIN 81 MG: 81 TABLET, CHEWABLE ORAL at 09:18

## 2021-08-19 NOTE — PROGRESS NOTES
Nutrition Services    Patient Name: Benson Mclean  YOB: 1950  MRN: [id number]  Admission date: 8/3/2021    PPE Documentation        PPE Worn By Provider Did not enter room on this encounter   PPE Worn By Patient  -      PROGRESS NOTE      Encounter Information: Progress note to monitor PO intakes        PO Diet: Diet Regular  NPO Diet   PO Supplements: Boost glucose Control q daily    PO Intake:  PO intakes inconsistent, ranging from 25%-75%        Nutrition support orders: -    Nutrition support review: -       Labs (reviewed below): Reviewed-management per attending         GI Function:  + BM 8/16, if does not stool today        Nutrition Intervention: Increasing frequency of ONS to BID    Boost Glucose Control BID (Provides 380 kcals, 32 g protein if consumed)      Results from last 7 days   Lab Units 08/19/21  0613 08/18/21  0607 08/17/21  0538   SODIUM mmol/L 134* 137 134*   POTASSIUM mmol/L 3.7 4.3 3.2*   CHLORIDE mmol/L 94* 97* 97*   CO2 mmol/L 31.0* 28.0 28.0   BUN mg/dL 23 49* 35*   CREATININE mg/dL 1.50* 2.48* 1.77*   CALCIUM mg/dL 8.6 8.6 7.4*   GLUCOSE mg/dL 198* 149* 156*     Results from last 7 days   Lab Units 08/19/21  0613 08/18/21  0607 08/18/21  0607 08/14/21  0432 08/14/21  0412 08/13/21  0321 08/13/21  0321   MAGNESIUM mg/dL  --   --  1.9  --  2.2  --  2.2   PHOSPHORUS mg/dL 2.4*   < > 5.3*   < > 5.4*   < > 5.3*   HEMOGLOBIN g/dL 9.4*   < > 8.6*   < > 8.1*   < > 7.5*   HEMOGLOBIN, POC   --   --   --    < >  --   --   --    HEMATOCRIT % 27.7*   < > 26.0*   < > 24.3*   < > 22.1*   HEMATOCRIT POC   --   --   --    < >  --   --   --     < > = values in this interval not displayed.     COVID19   Date Value Ref Range Status   08/03/2021 Not Detected Not Detected - Ref. Range Final     Lab Results   Component Value Date    HGBA1C 7.4 (H) 07/21/2021       RD to follow up per protocol.    Electronically signed by:  Flores Au RD  08/19/21 09:52 EDT

## 2021-08-19 NOTE — PLAN OF CARE
Problem: Arrhythmia/Dysrhythmia  Goal: Normalized Cardiac Rhythm  Outcome: Ongoing, Progressing  Intervention: Monitor and Manage Cardiac Rhythm Effects  Recent Flowsheet Documentation  Taken 8/19/2021 1200 by Sahra Carpenter RN  VTE Prevention/Management: sequential compression devices off     Problem: Fall Injury Risk  Goal: Absence of Fall and Fall-Related Injury  Outcome: Ongoing, Progressing  Intervention: Identify and Manage Contributors to Fall Injury Risk  Recent Flowsheet Documentation  Taken 8/19/2021 1600 by Sahra Carpenter RN  Medication Review/Management: medications reviewed  Taken 8/19/2021 1400 by Sahra Carpenter RN  Medication Review/Management: medications reviewed  Taken 8/19/2021 1200 by Sahra Carpenter RN  Medication Review/Management: medications reviewed  Taken 8/19/2021 1000 by Sahra Carpenter RN  Medication Review/Management: medications reviewed  Taken 8/19/2021 0800 by Sahra Carpenter, RN  Medication Review/Management: medications reviewed  Intervention: Promote Injury-Free Environment  Recent Flowsheet Documentation  Taken 8/19/2021 1600 by Sahra Carpenter RN  Safety Promotion/Fall Prevention:   safety round/check completed   fall prevention program maintained  Taken 8/19/2021 1400 by Sahra Carpenter RN  Safety Promotion/Fall Prevention:   safety round/check completed   fall prevention program maintained  Taken 8/19/2021 1200 by Sahra Carpenter RN  Safety Promotion/Fall Prevention:   safety round/check completed   fall prevention program maintained  Taken 8/19/2021 1000 by Sahra Carpenter, RN  Safety Promotion/Fall Prevention:   safety round/check completed   room organization consistent   fall prevention program maintained  Taken 8/19/2021 0800 by Sahra Carpenter, RN  Safety Promotion/Fall Prevention:   safety round/check completed   fall prevention program maintained     Problem: Adult Inpatient Plan of  Care  Goal: Plan of Care Review  Outcome: Ongoing, Progressing  Goal: Patient-Specific Goal (Individualized)  Outcome: Ongoing, Progressing  Goal: Absence of Hospital-Acquired Illness or Injury  Outcome: Ongoing, Progressing  Intervention: Identify and Manage Fall Risk  Recent Flowsheet Documentation  Taken 8/19/2021 1600 by Sahra Carpenter RN  Safety Promotion/Fall Prevention:   safety round/check completed   fall prevention program maintained  Taken 8/19/2021 1400 by Sahra Carpenter RN  Safety Promotion/Fall Prevention:   safety round/check completed   fall prevention program maintained  Taken 8/19/2021 1200 by Sahra Carpenter RN  Safety Promotion/Fall Prevention:   safety round/check completed   fall prevention program maintained  Taken 8/19/2021 1000 by Sahra Carpenter RN  Safety Promotion/Fall Prevention:   safety round/check completed   room organization consistent   fall prevention program maintained  Taken 8/19/2021 0800 by Sahra Carpenter RN  Safety Promotion/Fall Prevention:   safety round/check completed   fall prevention program maintained  Intervention: Prevent and Manage VTE (venous thromboembolism) Risk  Recent Flowsheet Documentation  Taken 8/19/2021 1200 by Sahra Carpenter RN  VTE Prevention/Management: sequential compression devices off  Goal: Optimal Comfort and Wellbeing  Outcome: Ongoing, Progressing  Intervention: Provide Person-Centered Care  Recent Flowsheet Documentation  Taken 8/19/2021 0800 by Sahra Carpenter RN  Trust Relationship/Rapport:   care explained   choices provided   emotional support provided   empathic listening provided  Goal: Readiness for Transition of Care  Outcome: Ongoing, Progressing     Problem: Skin Injury Risk Increased  Goal: Skin Health and Integrity  Outcome: Ongoing, Progressing  Intervention: Optimize Skin Protection  Recent Flowsheet Documentation  Taken 8/19/2021 0800 by Sahra Carpenter RN  Pressure  Reduction Techniques:   frequent weight shift encouraged   weight shift assistance provided  Pressure Reduction Devices: positioning supports utilized     Problem: Breathing Pattern Ineffective  Goal: Effective Breathing Pattern  Outcome: Ongoing, Progressing     Problem: Noninvasive Ventilation Acute  Goal: Effective Unassisted Ventilation and Oxygenation  Outcome: Ongoing, Progressing     Problem: Fluid Volume Excess  Goal: Fluid Balance  Outcome: Ongoing, Progressing   Goal Outcome Evaluation:

## 2021-08-19 NOTE — PROGRESS NOTES
Referring Provider: Maurilio Boucher,*    Reason for follow-up:  Atrial fibrillation  Cardiomyopathy  Shortness of breath  Bradycardia  Respiratory insufficiency      Patient Care Team:  Samantha Zabala APRN as PCP - General  Samantha Zbaala APRN as PCP - Family Medicine  Jose G Rm MD as Consulting Physician (Cardiology)    Subjective .  Feeling much better after dialysis was started.    ROS  Since I have last seen, the patient has been without any chest discomfort ,shortness of breath, palpitations, dizziness or syncope.  Denies having any headache ,abdominal pain ,nausea, vomiting , diarrhea constipation, loss of weight or loss of appetite.  Denies having any excessive bruising ,hematuria or blood in the stool.    Review of all systems negative except as indicated.    Reviewed ROS.  History  Past Medical History:   Diagnosis Date   • A-fib (CMS/[facility]) 8/3/2021   • Appetite loss    • Arthritis    • Brain bleed (CMS/[facility])    • Carpal tunnel syndrome on left    • CHF (congestive heart failure) (CMS/[facility])    • Chronic left-sided low back pain without sciatica 12/27/2017   • CKD (chronic kidney disease) stage 3, GFR 30-59 ml/min (CMS/[facility])    • Closed fracture of seventh thoracic vertebra (CMS/[facility]) 8/23/2020   • Cognitive communication deficit 12/1/2020   • COVID-19 2/19/2021   • Cytokine release syndrome, grade 1 5/24/2021   • Depression    • Diabetic neuropathy (CMS/[facility])    • DM2 (diabetes mellitus, type 2) (CMS/[facility])    • Hyperlipidemia    • Hypogonadism in male    • Nonsustained ventricular tachycardia (CMS/[facility]) 7/23/2021   • Obesity    • Paroxysmal atrial fibrillation (CMS/[facility]) 12/1/2020   • Sleep apnea    • Stroke (CMS/[facility])    • Unsteady gait        Past Surgical History:   Procedure Laterality Date   • ANGIOPLASTY      X2   • APPENDECTOMY     • CARDIAC CATHETERIZATION  11/13/2015   • CARDIAC CATHETERIZATION N/A 5/24/2021    Procedure: Left Heart Cath and coronary angiogram;  Surgeon: Jose G Rm MD;   Location:  [facility]AS[facility];  Service: Cardiovascular;  Laterality: N/A;   • CARDIAC CATHETERIZATION  5/24/2021    Procedure: Saphenous Vein Graft;  Surgeon: Jose G Rm MD;  Location:  [facility];  Service: Cardiovascular;;   • CARDIAC CATHETERIZATION N/A 5/24/2021    Procedure: Percutaneous Coronary Intervention;  Surgeon: Bernabe Zambrano MD;  Location:  [facility];  Service: Cardiology;  Laterality: N/A;   • CARDIAC CATHETERIZATION N/A 5/24/2021    Procedure: Stent NIELS coronary;  Surgeon: Bernabe Zambrano MD;  Location:  [facility];  Service: Cardiology;  Laterality: N/A;   • CARDIAC CATHETERIZATION N/A 5/26/2021    Procedure: Percutaneous Coronary Intervention;  Surgeon: Bernabe Zambrano MD;  Location:  [facility];  Service: Cardiovascular;  Laterality: N/A;   • CARDIAC CATHETERIZATION N/A 5/26/2021    Procedure: Stent NIELS bypass graft;  Surgeon: Bernabe Zambrano MD;  Location: [facility] CATH INVASIVE LOCATION;  Service: Cardiovascular;  Laterality: N/A;   • CARDIAC ELECTROPHYSIOLOGY PROCEDURE N/A 5/24/2021    Procedure: Temporary Pacemaker;  Surgeon: Bernabe Zambrano MD;  Location: [facility] CATH INVASIVE LOCATION;  Service: Cardiology;  Laterality: N/A;   • CARDIAC ELECTROPHYSIOLOGY PROCEDURE N/A 5/26/2021    Procedure: Temporary Pacemaker;  Surgeon: Bernabe Zambrano MD;  Location: [facility] CATH INVASIVE LOCATION;  Service: Cardiovascular;  Laterality: N/A;   • CARPAL TUNNEL RELEASE Left 04/29/2018    carpal tunnel- lt hand// other hand surgeries    • CATARACT EXTRACTION, BILATERAL  2002    Dr. Lux Acosta   • CHOLECYSTECTOMY     • COLON RESECTION  2005   • CORONARY ANGIOPLASTY     • CORONARY ANGIOPLASTY WITH STENT PLACEMENT  11/13/2015    PTCA stent to proximal in stent and mid to distal lad   • CORONARY ANGIOPLASTY WITH STENT PLACEMENT  09/16/2016    PTCA stent to mid lad and stent to vein graft to marginal   • CORONARY ARTERY BYPASS GRAFT   2005    @ [facility]   • CYST REMOVAL      cyst removed from scrotum   • FOOT SURGERY Right 07/17/2018   • FOOT SURGERY Left 02/04/2019   • TOE AMPUTATION Right     right toe removed D/T infected cut that went to the bone       Family History   Problem Relation Age of Onset   • Heart disease Mother    • Heart disease Father    • Diabetes Sister    • Heart disease Sister    • Diabetes Brother    • Mental illness Brother        Social History     Tobacco Use   • Smoking status: Former Smoker     Packs/day: 1.00     Years: 60.00     Pack years: 60.00     Types: Cigarettes   • Smokeless tobacco: Never Used   • Tobacco comment: Patient quit smoking 11/2020   Vaping Use   • Vaping Use: Never used   Substance Use Topics   • Alcohol use: No   • Drug use: Yes     Frequency: 1.0 times per week     Types: Marijuana     Comment: socially        Medications Prior to Admission   Medication Sig Dispense Refill Last Dose   • albuterol sulfate  (90 Base) MCG/ACT inhaler Inhale 2 puffs Every 4 (Four) Hours As Needed.      • apixaban (ELIQUIS) 5 MG tablet tablet Take 5 mg by mouth Daily.      • aspirin 81 MG chewable tablet Chew 81 mg Daily.      • atorvastatin (LIPITOR) 40 MG tablet Take 40 mg by mouth Every Night.      • cholecalciferol (VITAMIN D3) 25 MCG (1000 UT) tablet Take 1,000 Units by mouth Daily.      • dextrose (GLUTOSE) 40 % gel Take  by mouth Every 1 (One) Hour As Needed for Low Blood Sugar.      • donepezil (ARICEPT) 10 MG tablet Take 10 mg by mouth Every Night.      • DULoxetine HCl (DRIZALMA) 60 MG capsule Take 60 mg by mouth Daily.      • gabapentin (NEURONTIN) 100 MG capsule Take 2 capsules by mouth 2 (two) times a day. 6 capsule 0    • hydrALAZINE (APRESOLINE) 50 MG tablet Take 50 mg by mouth 2 (two) times a day. Hold for SBP <110      • isosorbide mononitrate (IMDUR) 60 MG 24 hr tablet Take 60 mg by mouth 2 (Two) Times a Day.      • metoprolol succinate XL (TOPROL-XL) 100 MG 24 hr tablet Take 1 tablet by mouth  Daily for 30 days. 30 tablet 0    • nitroglycerin (NITROSTAT) 0.4 MG SL tablet Place 1 tablet under the tongue Every 5 (Five) Minutes As Needed for Chest Pain (Only if SBP Greater Than 100). Take no more than 3 doses in 15 minutes. 30 tablet 12    • spironolactone (ALDACTONE) 25 MG tablet Take 1 tablet by mouth Daily for 30 days. 30 tablet 0    • tiotropium (Spiriva HandiHaler) 18 MCG per inhalation capsule Place 1 capsule into inhaler and inhale Daily. 30 capsule 1    • vitamin B-12 (CYANOCOBALAMIN) 1000 MCG tablet Take 1,000 mcg by mouth Daily.      • [DISCONTINUED] furosemide (LASIX) 40 MG tablet Take 1 tablet by mouth 2 (Two) Times a Day for 30 days. 60 tablet 0        Allergies  Codeine    Scheduled Meds:amiodarone, 200 mg, Oral, Q12H  apixaban, 5 mg, Oral, Q12H  aspirin, 81 mg, Oral, Daily  atorvastatin, 40 mg, Oral, Nightly  budesonide, 0.5 mg, Nebulization, BID - RT  bumetanide, 2 mg, Intravenous, Q12H  carvedilol, 12.5 mg, Oral, Q12H  cholecalciferol, 1,000 Units, Oral, Daily  dilTIAZem CD, 240 mg, Oral, Q24H  donepezil, 10 mg, Oral, Nightly  DULoxetine, 60 mg, Oral, Daily  epoetin izabella-epbx, 20,000 Units, Subcutaneous, Weekly  insulin lispro, 0-14 Units, Subcutaneous, TID AC  ipratropium-albuterol, 3 mL, Nebulization, Q4H - RT  isosorbide mononitrate, 60 mg, Oral, BID - Nitrates  metOLazone, 5 mg, Oral, Daily  sodium chloride, 10 mL, Intravenous, Q12H  vitamin B-12, 1,000 mcg, Oral, Daily      Continuous Infusions:   PRN Meds:.•  acetaminophen **OR** acetaminophen **OR** acetaminophen  •  albumin human  •  aluminum-magnesium hydroxide-simethicone  •  dextrose  •  dextrose  •  glucagon (human recombinant)  •  heparin (porcine)  •  insulin lispro **AND** insulin lispro  •  melatonin  •  nitroglycerin  •  ondansetron **OR** ondansetron  •  [COMPLETED] Insert peripheral IV **AND** sodium chloride  •  sodium chloride    Objective     VITAL SIGNS  Vitals:    08/18/21 2257 08/19/21 0055 08/19/21 0501 08/19/21  "0600   BP: 105/55 115/55 (!) 121/9 103/56   BP Location:  Right arm Right arm Left arm   Patient Position:  Lying Lying Lying   Pulse: 93 90 70 101   Resp:  18 18 14   Temp:  97.9 °F (36.6 °C) 98 °F (36.7 °C) 98.2 °F (36.8 °C)   TempSrc:  Oral Oral Oral   SpO2: 97%   94%   Weight:       Height:           Flowsheet Rows      First Filed Value   Admission Height  180.3 cm (71\") Documented at 08/03/2021 1733   Admission Weight  113 kg (250 lb) Documented at 08/03/2021 1733            Intake/Output Summary (Last 24 hours) at 8/19/2021 0642  Last data filed at 8/19/2021 0501  Gross per 24 hour   Intake 840 ml   Output 3900 ml   Net -3060 ml        TELEMETRY: Atrial fibrillation with controlled ventricular response.    Physical Exam:  The patient is awake and alert and not in distress.  Vital signs as noted above.  Exogenous obesity.  Head and neck revealed no carotid bruits or jugular venous distention.  No thyromegaly or lymphadenopathy is present  Lungs clear.  No wheezing.  Breath sounds are normal bilaterally.  Heart normal first and second heart sounds.  No murmur. No precordial rub is present.  No gallop is present.  Abdomen soft and nontender.  No organomegaly is present.  Extremities with good peripheral pulses without any pedal edema.  Skin warm and dry.  Musculoskeletal system is grossly normal  CNS-grossly normal.  Results Review:   I reviewed the patient's new clinical results.  Lab Results (last 24 hours)     Procedure Component Value Units Date/Time    Hepatitis B Core Antibody, Total [[id number]] Collected: 08/18/21 1335    Specimen: Blood Updated: 08/19/21 0510     Hep B Core Total Ab Negative    Narrative:      Performed at:  [address]  [id number]  : Saul Irwin PhD, Phone:  [phone]    POC Glucose Once [[id number]]  (Abnormal) Collected: 08/18/21 2102    Specimen: Blood Updated: 08/18/21 2103     Glucose 198 mg/dL      Comment: Serial Number: " [id number]:  [id number]       Hepatitis B Surface Antibody [[id number]]  (Normal) Collected: 08/18/21 1335    Specimen: Blood Updated: 08/18/21 2045     Hep B S Ab Non-Reactive    Narrative:      Results may be falsely decreased if patient taking Biotin.      POC Glucose Once [[id number]]  (Abnormal) Collected: 08/18/21 1619    Specimen: Blood Updated: 08/18/21 1621     Glucose 245 mg/dL      Comment: Serial Number: [id number]:  [id number]       POC Glucose Once [[id number]]  (Abnormal) Collected: 08/18/21 1117    Specimen: Blood Updated: 08/18/21 1118     Glucose 263 mg/dL      Comment: Serial Number: [id number]:  [id number]             Imaging Results (Last 24 Hours)     ** No results found for the last 24 hours. **      LAB RESULTS (LAST 7 DAYS)    CBC  Results from last 7 days   Lab Units 08/18/21  0607 08/17/21  0538 08/16/21  1120 08/16/21  0436 08/14/21  0440 08/14/21  0432 08/14/21  0412 08/13/21  0321 08/13/21  0321   WBC 10*3/mm3 10.00 7.60 7.20 7.10  --   --  10.30  --  7.70   RBC 10*6/mm3 3.00* 2.73* 2.66* 2.26*  --   --  2.79*  --  2.54*   HEMOGLOBIN g/dL 8.6* 7.7* 7.6*  7.6* 6.6*  --   --  8.1*   < > 7.5*   HEMOGLOBIN, POC g/dL  --   --   --   --  10.7* 8.3*  --   --   --    HEMATOCRIT % 26.0* 23.6* 22.9*  22.9* 19.5*  --   --  24.3*   < > 22.1*   HEMATOCRIT POC %  --   --   --   --  31* 25*  --   --   --    MCV fL 86.5 86.3 86.2 86.4  --   --  86.9  --  87.1   PLATELETS 10*3/mm3 383 314 305 301  --   --  309  --  279    < > = values in this interval not displayed.       BMP  Results from last 7 days   Lab Units 08/18/21  0607 08/17/21  0538 08/16/21  0308 08/15/21  0447 08/14/21  0800 08/14/21  0412 08/13/21  0734 08/13/21  0321 08/13/21  0321   SODIUM mmol/L 137 134* 136 133*  --  134*  --   --  133*   POTASSIUM mmol/L 4.3 3.2* 3.5 3.4* 4.3 4.5 4.3   < > 4.4  4.4   CHLORIDE mmol/L 97* 97* 93* 91*  --  88*  --   --  89*   CO2 mmol/L 28.0 28.0 32.0* 32.0*  --  33.0*  --   --  33.0*    BUN mg/dL 49* 35* 57* 45*  --  87*  --   --  82*   CREATININE mg/dL 2.48* 1.77* 2.45* 1.70*  --  2.73*  --   --  2.95*   GLUCOSE mg/dL 149* 156* 167* 149*  --  182*  --   --  162*   MAGNESIUM mg/dL 1.9  --   --   --   --  2.2  --   --  2.2   PHOSPHORUS mg/dL 5.3* 2.0* 3.5 2.7  --  5.4*  --   --  5.3*    < > = values in this interval not displayed.       CMP   Results from last 7 days   Lab Units 08/18/21  0607 08/17/21  0538 08/16/21  0308 08/15/21  0447 08/14/21  0800 08/14/21  0412 08/13/21  0734 08/13/21  0321 08/13/21  0321   SODIUM mmol/L 137 134* 136 133*  --  134*  --   --  133*   POTASSIUM mmol/L 4.3 3.2* 3.5 3.4* 4.3 4.5 4.3   < > 4.4  4.4   CHLORIDE mmol/L 97* 97* 93* 91*  --  88*  --   --  89*   CO2 mmol/L 28.0 28.0 32.0* 32.0*  --  33.0*  --   --  33.0*   BUN mg/dL 49* 35* 57* 45*  --  87*  --   --  82*   CREATININE mg/dL 2.48* 1.77* 2.45* 1.70*  --  2.73*  --   --  2.95*   GLUCOSE mg/dL 149* 156* 167* 149*  --  182*  --   --  162*   ALBUMIN g/dL 3.40* 3.10* 3.20* 3.50  --  3.80  --   --  3.60    < > = values in this interval not displayed.         BNP        TROPONIN        CoAg  Results from last 7 days   Lab Units 08/14/21  0441   INR  1.17*   APTT seconds 33.4*       Creatinine Clearance  Estimated Creatinine Clearance: 32.5 mL/min (A) (by C-G formula based on SCr of 2.48 mg/dL (H)).    ABG  Results from last 7 days   Lab Units 08/14/21  1641 08/14/21  0626 08/14/21  0440 08/14/21  0432   PH, ARTERIAL pH units 7.444 7.294* 7.266* 7.270*   PCO2, ARTERIAL mm Hg 49.7* 72.5* 75.5* 78.3*   PO2 ART mm Hg 173.2* 193.8* 97.8 50.4*   O2 SATURATION ART % 99.6* 99.5* 96.0 77.7*   BASE EXCESS ART mmol/L 9.0* 7.2* 5.5* 7.6*       Radiology  No radiology results for the last day            EKG              I personally viewed and interpreted the patient's EKG/Telemetry data: Atrial fibrillation  ECHOCARDIOGRAM:    Results for orders placed during the hospital encounter of 07/20/21    Adult Transthoracic Echo  Complete With Contrast if Necessary Per Protocol    Interpretation Summary  · Estimated left ventricular EF = 30% Left ventricular systolic function is moderately decreased.    Occasions  Shortness of breath.    Technically satisfactory study.  Mitral valve is structurally normal.  Mild mitral regurgitation.  Tricuspid valve is structurally normal.  Aortic valve is structurally normal.  Pulmonic valve could not be well visualized.  No evidence for tricuspid or aortic regurgitation is seen by Doppler study.  Biatrial and biventricular enlargement is present.  Diffuse left ventricular hypocontractility with ejection fraction of 30%.  Atrial septum is intact.  Aorta is normal.  No pericardial effusion or intracardiac thrombus is seen.    Impression  Mild mitral regurgitation.  Significant biatrial and biventricular enlargement.  Diffuse left ventricular hypocontractility with ejection fraction of 30%.  No pericardial effusion or intracardiac thrombus is seen.          STRESS MYOVIEW:    Cardiolite (Tc-99m Sestamibi) stress test    CARDIAC CATHETERIZATION:            OTHER:         Assessment/Plan     Principal Problem:    Acute on chronic systolic heart failure (CMS/HCC)  Active Problems:    S/P CABG (coronary artery bypass graft)    Essential hypertension    Elevated troponin    ANNETTE treated with BiPAP    Major depressive disorder, recurrent, mild (CMS/HCC)    Mixed hyperlipidemia    History of cerebrovascular accident    Flaccid hemiplegia of right dominant side as late effect of cerebral infarction (CMS/HCC)    Diabetic neuropathy (CMS/HCC)    Unspecified dementia with behavioral disturbance (CMS/HCC)    Coronary artery disease    Obesity    Vitamin D deficiency    Chronic venous hypertension (idiopathic) with ulcer and inflammation of bilateral lower extremity (CMS/HCC)    Chronic obstructive pulmonary disease, unspecified (CMS/HCC)    Chronic foot ulcer (CMS/HCC)    Paroxysmal atrial fibrillation (CMS/HCC)    Acute  kidney injury (CMS/[facility])    Type 2 diabetes mellitus with hyperglycemia (CMS/[facility])    CKD (chronic kidney disease) stage 3, GFR 30-59 ml/min (CMS/[facility])    Anemia of renal disease    Acute respiratory failure with hypoxia and hypercapnia (CMS/[facility])      [[[[[[[[[[[[[[[[[[[[[[[[[    Impression  ==============  -Increased shortness of breath and palpitations     -Recent acute on chronic congestive heart failure.  Compensated at this time  Recent echocardiogram showed left ventricle dysfunction with ejection fraction of 35%.     -History of right-sided weakness with left MCA territory stroke.-Improved      -status post CABG 2005. status post stent to LAD 2009    Status post stent to LAD 11/13/2015  Status post stent to mid LAD and SVG to marginal branch 09/16/2016.  Status post stent to mid LAD 5/24/2021  Status post stent to SVG to RCA 5/26/2021     Cardiac catheterization 5/24/2021 revealed  Left ventricle angiogram was not performed due to pre-existing renal dysfunction.  Left main coronary artery normal.  Left anterior descending artery has mid segment lengthy 90% disease within the previously placed stent.  Distal left into descending artery has diffuse disease.  Circumflex coronary artery is totally occluded.  (Chronic)  Right coronary artery is chronically occluded.  SVG to marginal branch (jump graft to OM1 and OM 2) is totally occluded (new finding compared to 2015.  SVG to PDA has distal radiolucency.  However no definite obstructive disease is present.       -status post myocardial infarction 2000 and 2002 .      -history of intermittent atrial fibrillation-maintaining sinus rhythm     Transesophageal echocardiogram 11/30/2020.  Biatrial enlargement.  Smoking effect in the left atrium and left ventricle and left atrial appendage.  Mild mitral and aortic regurgitation.  Left ventricle enlargement with diffuse hypocontractility with ejection fraction of 35 to 40%.     Echocardiogram 11/27/2020 revealed left atrial  enlargement left ventricle dysfunction with ejection fraction of 40%.  Negative bubble study.      -diabetes hypertension and sleep apnea in history of renal dysfunction .  Recent BUN/creatinine 38/1.36     -status post colon surgery (partial colectomy) appendectomy cholecystectomy right 4th toe removal and carpal tunnel surgery      -continued smoking 1 pack per day -abstinence from smoking      -allergy to codeine.  ============   Plan  ================  Atrial fibrillation-chronic  Rate is better controlled  Tinea increased dose of Coreg at 12.5 mg p.o. twice a day.  Continue amiodarone and Cardizem CD.  Observe patient's blood pressure.    Respiratory insufficiency.-Improved    Significant renal dysfunction-chronic  Patient is on dialysis now.  Likely long-term  Patient is feeling much better after dialysis was started.    Anemia  Hemoglobin 6.9.  Hemoglobin 7.7-8/17/2021  8.6-8/18/2021    Status post CABG  Patient is not having any angina pectoris    Status post stent to LAD 5/24/2021.  Status post stent to SVG to RCA 5/26/2021.       Anticoagulation  Patient is on Eliquis.  Observe for toxic effects.    Further plan will depend on patient's progress.   ]]]]]]]]]]]]]]]]]]]]]]           Jose G Rm MD  08/19/21  06:42 EDT

## 2021-08-19 NOTE — PROGRESS NOTES
"PULMONARY CRITICAL CARE Progress  NOTE      PATIENT IDENTIFICATION:  Name: Benson Mclean  MRN: [id number]  :  1950     Age: 70 y.o.  Sex: male    DATE OF Note:  2021   Referring Physician: Angel Prajapati MD                  Subjective:   Less SOB on NC    No chest or abd pain, no bowel or bladder issues   Still in A-fib       Objective:  tMax 24 hrs: Temp (24hrs), Av.4 °F (36.9 °C), Min:98.1 °F (36.7 °C), Max:98.7 °F (37.1 °C)      Vitals Ranges:   Temp:  [98.1 °F (36.7 °C)-98.7 °F (37.1 °C)] 98.3 °F (36.8 °C)  Heart Rate:  [] 92  Resp:  [10-22] 16  BP: ()/(51-73) 113/51    Intake and Output Last 3 Shifts:   I/O last 3 completed shifts:  In: 680 [P.O.:680]  Out: 1400 [Urine:1400]    Exam:  /51   Pulse 92   Temp 98.3 °F (36.8 °C) (Oral)   Resp 16   Ht 170.2 cm (67.01\")   Wt 108 kg (238 lb 12.1 oz)   SpO2 98%   BMI 37.39 kg/m²     General Appearance:  Alert awake  HEENT:  Normocephalic, without obvious abnormality, Conjunctiva/corneas clear.  Normal external ear canals, Nares normal, no drainage     Neck:  Supple, symmetrical, trachea midline. No JVD.  Lungs /Chest wall: Scattered bilateral rhonchi, no wheezing, bibasilar crackles, respirations unlabored symmetrical wall movement.     Heart: Irregularly irregular, atrial fibrillation, +DEON, no rub or gallop  Abdomen: Soft, non-tender, active bowel sounds  Extremities: Trace BLE edema, no clubbing or cyanosis        Medications:    Current Facility-Administered Medications:   •  acetaminophen (TYLENOL) tablet 650 mg, 650 mg, Oral, Q4H PRN, 650 mg at 21 1713 **OR** acetaminophen (TYLENOL) 160 MG/5ML solution 650 mg, 650 mg, Oral, Q4H PRN **OR** acetaminophen (TYLENOL) suppository 650 mg, 650 mg, Rectal, Q4H PRN, Rayne Bah, FABIOLA  •  albumin human 25 % IV SOLN 12.5 g, 12.5 g, Intravenous, PRN, Katerina Beyer MD  •  aluminum-magnesium hydroxide-simethicone (MAALOX MAX) 400-400-40 MG/5ML suspension 15 mL, 15 mL, Oral, " Q6H PRN, Rayne Bah APRN  •  amiodarone (PACERONE) tablet 200 mg, 200 mg, Oral, Q12H, Jose G Rm MD, 200 mg at 08/18/21 0850  •  apixaban (ELIQUIS) tablet 5 mg, 5 mg, Oral, Q12H, Marcelo Álvarez MD, 5 mg at 08/18/21 0850  •  aspirin chewable tablet 81 mg, 81 mg, Oral, Daily, Rayne Bah APRN, 81 mg at 08/18/21 0849  •  atorvastatin (LIPITOR) tablet 40 mg, 40 mg, Oral, Nightly, Rayne Bah APRN, 40 mg at 08/17/21 2115  •  budesonide (PULMICORT) nebulizer solution 0.5 mg, 0.5 mg, Nebulization, BID - RT, Marcelo Álvarez MD, 0.5 mg at 08/18/21 1847  •  bumetanide (BUMEX) injection 2 mg, 2 mg, Intravenous, Q12H, Yony Bhat MD, 2 mg at 08/18/21 0052  •  carvedilol (COREG) tablet 12.5 mg, 12.5 mg, Oral, Q12H, Jose G Rm MD  •  cholecalciferol (VITAMIN D3) tablet 1,000 Units, 1,000 Units, Oral, Daily, Rayne Bah APRN, 1,000 Units at 08/18/21 0849  •  dextrose (D50W) 25 g/ 50mL Intravenous Solution 25 g, 25 g, Intravenous, Q15 Min PRN, Stanton Junior MD  •  dextrose (GLUTOSE) oral gel 15 g, 15 g, Oral, Q15 Min PRN, Stanton Junior MD  •  dilTIAZem CD (CARDIZEM CD) 24 hr capsule 240 mg, 240 mg, Oral, Q24H, Jose G Rm MD, 240 mg at 08/18/21 1106  •  donepezil (ARICEPT) tablet 10 mg, 10 mg, Oral, Nightly, Rayne Bah APRN, 10 mg at 08/17/21 2115  •  DULoxetine (CYMBALTA) DR capsule 60 mg, 60 mg, Oral, Daily, Rayne Bah, APRN, 60 mg at 08/18/21 0850  •  epoetin izabella-epbx (RETACRIT) injection 20,000 Units, 20,000 Units, Subcutaneous, Weekly, Yony Bhat MD, 20,000 Units at 08/13/21 2131  •  glucagon (human recombinant) (GLUCAGEN DIAGNOSTIC) injection 1 mg, 1 mg, Subcutaneous, Q15 Min PRN, Stanton Junior MD  •  heparin (porcine) injection 4,000 Units, 4,000 Units, Intracatheter, PRN, Crow Vences MD, 2,200 Units at 08/16/21 2349  •  insulin lispro (ADMELOG) injection 0-14 Units, 0-14 Units, Subcutaneous, TID AC, 5 Units at 08/18/21 1817 **AND** insulin  lispro (ADMELOG) injection 0-14 Units, 0-14 Units, Subcutaneous, PRN, Marcelo Álvarez MD  •  ipratropium-albuterol (DUO-NEB) nebulizer solution 3 mL, 3 mL, Nebulization, Q4H - RT, Marcelo Álvarez MD, 3 mL at 08/18/21 1846  •  isosorbide mononitrate (IMDUR) 24 hr tablet 60 mg, 60 mg, Oral, BID - Nitrates, Rayne Bah APRN, 60 mg at 08/17/21 0922  •  melatonin tablet 5 mg, 5 mg, Oral, Nightly PRN, Rayne Bah APRN  •  metOLazone (ZAROXOLYN) tablet 5 mg, 5 mg, Oral, Daily, Katerina Beyer MD, 5 mg at 08/17/21 0923  •  nitroglycerin (NITROSTAT) SL tablet 0.4 mg, 0.4 mg, Sublingual, Q5 Min PRN, Rayne Bah APRN  •  ondansetron (ZOFRAN) tablet 4 mg, 4 mg, Oral, Q6H PRN **OR** ondansetron (ZOFRAN) injection 4 mg, 4 mg, Intravenous, Q6H PRN, Rayne Bah APRN, 4 mg at 08/15/21 0510  •  [COMPLETED] Insert peripheral IV, , , Once **AND** sodium chloride 0.9 % flush 10 mL, 10 mL, Intravenous, PRN, Davis Zavala MD  •  sodium chloride 0.9 % flush 10 mL, 10 mL, Intravenous, Q12H, Rayne Bah APRN, 10 mL at 08/18/21 1133  •  sodium chloride 0.9 % flush 10 mL, 10 mL, Intravenous, PRN, Rayne Bah APRN  •  vitamin B-12 (CYANOCOBALAMIN) tablet 1,000 mcg, 1,000 mcg, Oral, Daily, Rayne Bah APRN, 1,000 mcg at 08/18/21 0850    Data Review:  All labs (24hrs):   Recent Results (from the past 24 hour(s))   Renal Function Panel    Collection Time: 08/18/21  6:07 AM    Specimen: Blood   Result Value Ref Range    Glucose 149 (H) 65 - 99 mg/dL    BUN 49 (H) 8 - 23 mg/dL    Creatinine 2.48 (H) 0.76 - 1.27 mg/dL    Sodium 137 136 - 145 mmol/L    Potassium 4.3 3.5 - 5.2 mmol/L    Chloride 97 (L) 98 - 107 mmol/L    CO2 28.0 22.0 - 29.0 mmol/L    Calcium 8.6 8.6 - 10.5 mg/dL    Albumin 3.40 (L) 3.50 - 5.20 g/dL    Phosphorus 5.3 (H) 2.5 - 4.5 mg/dL    Anion Gap 12.0 5.0 - 15.0 mmol/L    BUN/Creatinine Ratio 19.8 7.0 - 25.0    eGFR Non African Amer 26 (L) >60 mL/min/1.73   Magnesium    Collection Time: 08/18/21  6:07 AM     Specimen: Blood   Result Value Ref Range    Magnesium 1.9 1.6 - 2.4 mg/dL   CBC Auto Differential    Collection Time: 08/18/21  6:07 AM    Specimen: Blood   Result Value Ref Range    WBC 10.00 3.40 - 10.80 10*3/mm3    RBC 3.00 (L) 4.14 - 5.80 10*6/mm3    Hemoglobin 8.6 (L) 13.0 - 17.7 g/dL    Hematocrit 26.0 (L) 37.5 - 51.0 %    MCV 86.5 79.0 - 97.0 fL    MCH 28.5 26.6 - 33.0 pg    MCHC 33.0 31.5 - 35.7 g/dL    RDW 16.2 (H) 12.3 - 15.4 %    RDW-SD 49.4 37.0 - 54.0 fl    MPV 7.6 6.0 - 12.0 fL    Platelets 383 140 - 450 10*3/mm3    Neutrophil % 62.2 42.7 - 76.0 %    Lymphocyte % 20.0 19.6 - 45.3 %    Monocyte % 9.8 5.0 - 12.0 %    Eosinophil % 7.5 (H) 0.3 - 6.2 %    Basophil % 0.5 0.0 - 1.5 %    Neutrophils, Absolute 6.20 1.70 - 7.00 10*3/mm3    Lymphocytes, Absolute 2.00 0.70 - 3.10 10*3/mm3    Monocytes, Absolute 1.00 (H) 0.10 - 0.90 10*3/mm3    Eosinophils, Absolute 0.80 (H) 0.00 - 0.40 10*3/mm3    Basophils, Absolute 0.00 0.00 - 0.20 10*3/mm3    nRBC 0.1 0.0 - 0.2 /100 WBC   POC Glucose Once    Collection Time: 08/18/21  7:29 AM    Specimen: Blood   Result Value Ref Range    Glucose 182 (H) 70 - 105 mg/dL   POC Glucose Once    Collection Time: 08/18/21 11:17 AM    Specimen: Blood   Result Value Ref Range    Glucose 263 (H) 70 - 105 mg/dL   Hepatitis B Surface Antibody    Collection Time: 08/18/21  1:35 PM    Specimen: Blood   Result Value Ref Range    Hep B S Ab Non-Reactive Non-Reactive   POC Glucose Once    Collection Time: 08/18/21  4:19 PM    Specimen: Blood   Result Value Ref Range    Glucose 245 (H) 70 - 105 mg/dL   POC Glucose Once    Collection Time: 08/18/21  9:02 PM    Specimen: Blood   Result Value Ref Range    Glucose 198 (H) 70 - 105 mg/dL        Imaging:  XR Chest 1 View  Narrative: DATE OF EXAM:  8/17/2021 4:50 AM     PROCEDURE:  XR CHEST 1 VW-     INDICATIONS:  Shortness of breath; R00.0-Tachycardia, unspecified; R06.00-Dyspnea,  unspecified     COMPARISON:  08/16/2021     TECHNIQUE:   Single  radiographic AP view of the chest was obtained.     FINDINGS:  Cardiac size remains normal. There are postoperative changes of prior  CABG. Right IJ line terminates in the SVC. There is continued pulmonary  vascular congestion and evidence of interstitial edema. There are  bilateral dependent pleural effusions with airspace disease in both lung  bases.      Impression:    1. Continued findings of congestive heart failure and interstitial  pulmonary edema.  2. Bilateral dependent pleural effusions. Bibasilar airspace disease.     Electronically Signed By-Oscar Casper MD On:8/17/2021 7:24 AM  This report was finalized on [id number] by  Oscar Casper MD.       ASSESSMENT:  Pneumonia  COPD  Bilateral pleural effusion  Atrial fibrillation with RVR   Acute on chronic systolic heart failure  CAD S/P CABG   HTN  Elevated troponin  ANNETTE uses BiPAP  Hyperlipidemia  Flaccid hemiplegia of right dominant side as late effect of cerebral infarction   DM II with neuropathy   Dementia with behavioral disturbance  Chronic venous hypertension (idiopathic) with ulcer/inflammation of BLE  Chronic foot ulcer   CKD   Tobacco use disorder   Anemia      PLAN:  Encourage OOB daily   Keep sat > 88  Bronchodilator  Inhaled corticosteroids  Electrolytes/ glycemic control  DVT and GI prophylaxis.  Nutrition: P.o. diet and supplementation with boost.  Small amounts of intake as tolerated by saturations/O2 needs       Critical care time in direct medical management (   ) minutes  Electronically signed by Marcelo Álvarez MD, D,ABSM,  8/18/2021  21:24 EDT

## 2021-08-19 NOTE — PLAN OF CARE
Pt presented with no new acute issues, no s/s of acute respiratory distress, pt weaned from 7 liters high flow oxygen to 4 liters high flow oxygen at end of shift, pt tolerating well O2 sats 95 percent and higher.  Pt underwent dialysis this shift, 3 liters removed, pt administered albumin in dialysis related to low blood pressure, all evening blood pressure medications withheld due to anticipated dialysis.  Pt tolerating PO Amiodarone well this shift with no adverse side effects.  Will continue to monitor and observe.    Problem: Adult Inpatient Plan of Care  Goal: Plan of Care Review  Flowsheets (Taken 8/19/2021 0739)  Progress: improving  Plan of Care Reviewed With: patient     Problem: Fluid Volume Excess  Goal: Fluid Balance  Intervention: Monitor and Manage Hypervolemia  Recent Flowsheet Documentation  Taken 8/19/2021 0000 by Chandler Mckeon RN  Fluid/Electrolyte Management: fluids provided     Problem: Breathing Pattern Ineffective  Goal: Effective Breathing Pattern  Outcome: Ongoing, Progressing  Intervention: Promote Improved Breathing Pattern  Recent Flowsheet Documentation  Taken 8/19/2021 0000 by Chandler Mckeon RN  Supportive Measures: active listening utilized  Head of Bed (HOB):   HOB elevated   HOB at 30 degrees  Taken 8/18/2021 2000 by Chandler Mckeon RN  Supportive Measures: active listening utilized  Head of Bed (HOB):   HOB elevated   HOB at 30 degrees

## 2021-08-19 NOTE — PROGRESS NOTES
"RENAL/KCC:     LOS: 16 days    Patient Care Team:  Samantha Zabala APRN as PCP - General  Samantha Zabala APRN as PCP - Family Medicine  Jose G Rm MD as Consulting Physician (Cardiology)    Chief Complaint:  Palpitations    Subjective     Interval History:   S/P HD early this AM.    Objective     Vital Sign Min/Max for last 24 hours  Temp  Min: 97.9 °F (36.6 °C)  Max: 98.7 °F (37.1 °C)   BP  Min: 94/60  Max: 121/9   Pulse  Min: 70  Max: 122   Resp  Min: 10  Max: 22   SpO2  Min: 94 %  Max: 100 %   Flow (L/min)  Min: 4  Max: 20   No data recorded     Flowsheet Rows      First Filed Value   Admission Height  180.3 cm (71\") Documented at 08/03/2021 1733   Admission Weight  113 kg (250 lb) Documented at 08/03/2021 1733          No intake/output data recorded.  I/O last 3 completed shifts:  In: 840 [P.O.:240; I.V.:600]  Out: 4400 [Urine:1100; Other:3300]    Physical Exam:  GEN: Awake, NAD  ENT: PERRL, EOMI, MMM  NECK: Supple, no JVD  CHEST: CTAB, no W/R/C  CV: RRR, no M/G/R  ABD: Soft, NT, +BS  SKIN: Warm and Dry  NEURO: CN's intact      WBC WBC   Date Value Ref Range Status   08/19/2021 10.70 3.40 - 10.80 10*3/mm3 Final   08/18/2021 10.00 3.40 - 10.80 10*3/mm3 Final   08/17/2021 7.60 3.40 - 10.80 10*3/mm3 Final   08/16/2021 7.20 3.40 - 10.80 10*3/mm3 Final      HGB Hemoglobin   Date Value Ref Range Status   08/19/2021 9.4 (L) 13.0 - 17.7 g/dL Final   08/18/2021 8.6 (L) 13.0 - 17.7 g/dL Final   08/17/2021 7.7 (L) 13.0 - 17.7 g/dL Final   08/16/2021 7.6 (L) 13.0 - 17.7 g/dL Final   08/16/2021 7.6 (L) 13.0 - 17.7 g/dL Final      HCT Hematocrit   Date Value Ref Range Status   08/19/2021 27.7 (L) 37.5 - 51.0 % Final   08/18/2021 26.0 (L) 37.5 - 51.0 % Final   08/17/2021 23.6 (L) 37.5 - 51.0 % Final   08/16/2021 22.9 (L) 37.5 - 51.0 % Final   08/16/2021 22.9 (L) 37.5 - 51.0 % Final      Platlets No results found for: LABPLAT   MCV MCV   Date Value Ref Range Status   08/19/2021 87.6 79.0 - 97.0 fL Final   08/18/2021 86.5 " 79.0 - 97.0 fL Final   08/17/2021 86.3 79.0 - 97.0 fL Final   08/16/2021 86.2 79.0 - 97.0 fL Final          Sodium Sodium   Date Value Ref Range Status   08/19/2021 134 (L) 136 - 145 mmol/L Final   08/18/2021 137 136 - 145 mmol/L Final   08/17/2021 134 (L) 136 - 145 mmol/L Final      Potassium Potassium   Date Value Ref Range Status   08/19/2021 3.7 3.5 - 5.2 mmol/L Final   08/18/2021 4.3 3.5 - 5.2 mmol/L Final     Comment:     Result checked    08/17/2021 3.2 (L) 3.5 - 5.2 mmol/L Final      Chloride Chloride   Date Value Ref Range Status   08/19/2021 94 (L) 98 - 107 mmol/L Final   08/18/2021 97 (L) 98 - 107 mmol/L Final   08/17/2021 97 (L) 98 - 107 mmol/L Final      CO2 CO2   Date Value Ref Range Status   08/19/2021 31.0 (H) 22.0 - 29.0 mmol/L Final   08/18/2021 28.0 22.0 - 29.0 mmol/L Final   08/17/2021 28.0 22.0 - 29.0 mmol/L Final      BUN BUN   Date Value Ref Range Status   08/19/2021 23 8 - 23 mg/dL Final   08/18/2021 49 (H) 8 - 23 mg/dL Final   08/17/2021 35 (H) 8 - 23 mg/dL Final      Creatinine Creatinine   Date Value Ref Range Status   08/19/2021 1.50 (H) 0.76 - 1.27 mg/dL Final   08/18/2021 2.48 (H) 0.76 - 1.27 mg/dL Final   08/17/2021 1.77 (H) 0.76 - 1.27 mg/dL Final      Calcium Calcium   Date Value Ref Range Status   08/19/2021 8.6 8.6 - 10.5 mg/dL Final   08/18/2021 8.6 8.6 - 10.5 mg/dL Final   08/17/2021 7.4 (L) 8.6 - 10.5 mg/dL Final      PO4 No results found for: CAPO4   Albumin Albumin   Date Value Ref Range Status   08/19/2021 3.90 3.50 - 5.20 g/dL Final   08/18/2021 3.40 (L) 3.50 - 5.20 g/dL Final   08/17/2021 3.10 (L) 3.50 - 5.20 g/dL Final      Magnesium Magnesium   Date Value Ref Range Status   08/18/2021 1.9 1.6 - 2.4 mg/dL Final      Uric Acid No results found for: URICACID        Results Review:     I reviewed the patient's new clinical results.    amiodarone, 200 mg, Oral, Q12H  apixaban, 5 mg, Oral, Q12H  aspirin, 81 mg, Oral, Daily  atorvastatin, 40 mg, Oral, Nightly  budesonide, 0.5  mg, Nebulization, BID - RT  bumetanide, 2 mg, Intravenous, Q12H  carvedilol, 12.5 mg, Oral, Q12H  cholecalciferol, 1,000 Units, Oral, Daily  dilTIAZem CD, 240 mg, Oral, Q24H  donepezil, 10 mg, Oral, Nightly  DULoxetine, 60 mg, Oral, Daily  epoetin izabella-epbx, 20,000 Units, Subcutaneous, Weekly  insulin lispro, 0-14 Units, Subcutaneous, TID AC  ipratropium-albuterol, 3 mL, Nebulization, Q4H - RT  isosorbide mononitrate, 60 mg, Oral, BID - Nitrates  metOLazone, 5 mg, Oral, Daily  sodium chloride, 10 mL, Intravenous, Q12H  vitamin B-12, 1,000 mcg, Oral, Daily           Medication Review: Reviewed    Assessment/Plan       Acute on chronic systolic heart failure (CMS/HCC)    S/P CABG (coronary artery bypass graft)    Essential hypertension    Elevated troponin    ANNETTE treated with BiPAP    Major depressive disorder, recurrent, mild (CMS/[facility])    Mixed hyperlipidemia    History of cerebrovascular accident    Flaccid hemiplegia of right dominant side as late effect of cerebral infarction (CMS/HCC)    Diabetic neuropathy (CMS/[facility])    Unspecified dementia with behavioral disturbance (CMS/[facility])    Coronary artery disease    Obesity    Vitamin D deficiency    Chronic venous hypertension (idiopathic) with ulcer and inflammation of bilateral lower extremity (CMS/HCC)    Chronic obstructive pulmonary disease, unspecified (CMS/HCC)    Chronic foot ulcer (CMS/HCC)    Paroxysmal atrial fibrillation (CMS/HCC)    Acute kidney injury (CMS/[facility])    Type 2 diabetes mellitus with hyperglycemia (CMS/HCC)    CKD (chronic kidney disease) stage 3, GFR 30-59 ml/min (CMS/HCC)    Anemia of renal disease    Acute respiratory failure with hypoxia and hypercapnia (CMS/HCC)      1. DILEEP  2. CKD III  3. Hyperkalemia  4. Pulmonary edema  5. Tachycardia  6. CHF  7. T2DM  8. Hypertension  9. Anemia     PLAN:  HD MWF.  Will plan permcath tomorrow if Cr rising steadily.  Social work to look for outpatient HD spot for acute renal failure.  Will reassess in AM.          Yony Bhat M.D.  [facility]

## 2021-08-19 NOTE — PLAN OF CARE
Goal Outcome Evaluation:                Assessment: Benson Mclean presents with ADL impairments below baseline abilities which indicate the need for continued skilled intervention while inpatient. He  Demos general weakness in BLE and requires mod assist to stand from chair.  -130 bpm throughout therapy session.  Pt requested to return to chair due to mild dizziness.  O2 remained WFL throughout.  Tolerating session today without incident. Will continue to follow and progress as tolerated.     Plan/Recommendations:   Pt would benefit from Inpatient Rehabilitation placement at discharge from facility.   Pt desires Inpatient Rehabilitation placement at discharge. Pt cooperative; agreeable to therapeutic recommendations and plan of care. v

## 2021-08-19 NOTE — PLAN OF CARE
Pt  presents with functional mobility impairments which indicate the need for skilled intervention. Tolerating session today without incident. Pt on 4L hi-missael O2.  Pt required mod A for bed mobility, min/mod A x 2 for transfers, ambulated 10' with RW with cga/min A x 2 and completed sitting BLE strengthening exercises.  O2 sats decreased to 84% with transition to sitting EOB.  DB/PLB exercises slowly rebounded sats to 93%. Will continue to follow and progress as tolerated.

## 2021-08-19 NOTE — PROGRESS NOTES
"PULMONARY CRITICAL CARE Progress  NOTE      PATIENT IDENTIFICATION:  Name: Benson Mclean  MRN: [id number]  :  1950     Age: 70 y.o.  Sex: male    DATE OF Note:  2021   Referring Physician: Angel Prajapati MD                  Subjective:   Feeling a little better, less SOB on NC, No chest or abd pain, no bowel or bladder issues, still in A-fib, no complaints voiced per nursing       Objective:  tMax 24 hrs: Temp (24hrs), Av.3 °F (36.8 °C), Min:97.9 °F (36.6 °C), Max:99.1 °F (37.3 °C)      Vitals Ranges:   Temp:  [97.9 °F (36.6 °C)-99.1 °F (37.3 °C)] 98.4 °F (36.9 °C)  Heart Rate:  [] 86  Resp:  [13-20] 20  BP: ()/(9-62) 101/59    Intake and Output Last 3 Shifts:   I/O last 3 completed shifts:  In: 840 [P.O.:240; I.V.:600]  Out: 4400 [Urine:1100; Other:3300]    Exam:  /59 (BP Location: Left arm, Patient Position: Lying)   Pulse 86   Temp 98.4 °F (36.9 °C) (Oral)   Resp 20   Ht 170.2 cm (67.01\")   Wt 108 kg (238 lb 12.1 oz)   SpO2 97%   BMI 37.39 kg/m²     General Appearance:  Alert awake  HEENT:  Normocephalic, without obvious abnormality, Conjunctiva/corneas clear.  Normal external ear canals, Nares normal, no drainage     Neck:  Supple, symmetrical, trachea midline. No JVD.  Lungs /Chest wall: Scattered bilateral rhonchi, no wheezing, bibasilar crackles, respirations unlabored symmetrical wall movement.     Heart: Irregularly irregular, atrial fibrillation, +DEON, no rub or gallop  Abdomen: Soft, non-tender, active bowel sounds  Extremities: Trace BLE edema, no clubbing or cyanosis        Medications:    Current Facility-Administered Medications:   •  acetaminophen (TYLENOL) tablet 650 mg, 650 mg, Oral, Q4H PRN, 650 mg at 21 1713 **OR** acetaminophen (TYLENOL) 160 MG/5ML solution 650 mg, 650 mg, Oral, Q4H PRN **OR** acetaminophen (TYLENOL) suppository 650 mg, 650 mg, Rectal, Q4H PRN, Rayne Bah, APRN  •  albumin human 25 % IV SOLN 12.5 g, 12.5 g, Intravenous, PRN, " Katerina Beyer MD, 12.5 g at 08/19/21 0307  •  aluminum-magnesium hydroxide-simethicone (MAALOX MAX) 400-400-40 MG/5ML suspension 15 mL, 15 mL, Oral, Q6H PRN, Rayne Bah APRN  •  amiodarone (PACERONE) tablet 200 mg, 200 mg, Oral, Q12H, Jose G Rm MD, 200 mg at 08/19/21 0917  •  apixaban (ELIQUIS) tablet 5 mg, 5 mg, Oral, Q12H, Marcelo Álvarez MD, 5 mg at 08/19/21 0918  •  aspirin chewable tablet 81 mg, 81 mg, Oral, Daily, Rayne Bah APRN, 81 mg at 08/19/21 0918  •  atorvastatin (LIPITOR) tablet 40 mg, 40 mg, Oral, Nightly, Rayne Bah APRN, 40 mg at 08/18/21 2303  •  budesonide (PULMICORT) nebulizer solution 0.5 mg, 0.5 mg, Nebulization, BID - RT, Marcelo Álvarez MD, 0.5 mg at 08/19/21 0709  •  bumetanide (BUMEX) injection 2 mg, 2 mg, Intravenous, Q12H, Yony Bhat MD, 2 mg at 08/18/21 0052  •  carvedilol (COREG) tablet 12.5 mg, 12.5 mg, Oral, Q12H, Jose G Rm MD  •  cholecalciferol (VITAMIN D3) tablet 1,000 Units, 1,000 Units, Oral, Daily, Rayne Bah APRN, 1,000 Units at 08/19/21 0917  •  dextrose (D50W) 25 g/ 50mL Intravenous Solution 25 g, 25 g, Intravenous, Q15 Min PRN, Stanton Junior MD  •  dextrose (GLUTOSE) oral gel 15 g, 15 g, Oral, Q15 Min PRN, Stanton Junior MD  •  dilTIAZem CD (CARDIZEM CD) 24 hr capsule 240 mg, 240 mg, Oral, Q24H, Jose G Rm MD, 240 mg at 08/19/21 0917  •  donepezil (ARICEPT) tablet 10 mg, 10 mg, Oral, Nightly, Ranye Bah APRN, 10 mg at 08/18/21 2303  •  DULoxetine (CYMBALTA) DR capsule 60 mg, 60 mg, Oral, Daily, Rayne Bah APRN, 60 mg at 08/19/21 0917  •  epoetin izabella-epbx (RETACRIT) injection 20,000 Units, 20,000 Units, Subcutaneous, Weekly, Yony Bhat MD, 20,000 Units at 08/13/21 2131  •  glucagon (human recombinant) (GLUCAGEN DIAGNOSTIC) injection 1 mg, 1 mg, Subcutaneous, Q15 Min PRN, Stanton Junior MD  •  heparin (porcine) injection 4,000 Units, 4,000 Units, Intracatheter, PRN, Crow Vences MD, 4,000  Units at 08/19/21 0500  •  insulin lispro (ADMELOG) injection 0-24 Units, 0-24 Units, Subcutaneous, 4x Daily With Meals & Nightly **AND** insulin lispro (ADMELOG) injection 0-24 Units, 0-24 Units, Subcutaneous, PRN, Maurilio Boucher, DO  •  ipratropium-albuterol (DUO-NEB) nebulizer solution 3 mL, 3 mL, Nebulization, Q4H - RT, Marcelo Álvarez MD, 3 mL at 08/19/21 1440  •  isosorbide mononitrate (IMDUR) 24 hr tablet 60 mg, 60 mg, Oral, BID - Nitrates, Rayne Bah APRN, 60 mg at 08/17/21 0922  •  melatonin tablet 5 mg, 5 mg, Oral, Nightly PRN, Rayne Bah APRN  •  metOLazone (ZAROXOLYN) tablet 5 mg, 5 mg, Oral, Daily, Katerina Beyer MD, 5 mg at 08/19/21 0917  •  nitroglycerin (NITROSTAT) SL tablet 0.4 mg, 0.4 mg, Sublingual, Q5 Min PRN, Rayne Bah APRN  •  ondansetron (ZOFRAN) tablet 4 mg, 4 mg, Oral, Q6H PRN **OR** ondansetron (ZOFRAN) injection 4 mg, 4 mg, Intravenous, Q6H PRN, Rayne Bah APRN, 4 mg at 08/15/21 0510  •  [COMPLETED] Insert peripheral IV, , , Once **AND** sodium chloride 0.9 % flush 10 mL, 10 mL, Intravenous, PRN, Davis Zavala MD  •  sodium chloride 0.9 % flush 10 mL, 10 mL, Intravenous, Q12H, Rayne Bah APRN, 10 mL at 08/19/21 0916  •  sodium chloride 0.9 % flush 10 mL, 10 mL, Intravenous, PRN, Rayne Bah APRN  •  vitamin B-12 (CYANOCOBALAMIN) tablet 1,000 mcg, 1,000 mcg, Oral, Daily, Rayne Bah APRN, 1,000 mcg at 08/19/21 0918    Data Review:  All labs (24hrs):   Recent Results (from the past 24 hour(s))   POC Glucose Once    Collection Time: 08/18/21  4:19 PM    Specimen: Blood   Result Value Ref Range    Glucose 245 (H) 70 - 105 mg/dL   POC Glucose Once    Collection Time: 08/18/21  9:02 PM    Specimen: Blood   Result Value Ref Range    Glucose 198 (H) 70 - 105 mg/dL   Renal Function Panel    Collection Time: 08/19/21  6:13 AM    Specimen: Blood   Result Value Ref Range    Glucose 198 (H) 65 - 99 mg/dL    BUN 23 8 - 23 mg/dL    Creatinine 1.50 (H) 0.76 -  1.27 mg/dL    Sodium 134 (L) 136 - 145 mmol/L    Potassium 3.7 3.5 - 5.2 mmol/L    Chloride 94 (L) 98 - 107 mmol/L    CO2 31.0 (H) 22.0 - 29.0 mmol/L    Calcium 8.6 8.6 - 10.5 mg/dL    Albumin 3.90 3.50 - 5.20 g/dL    Phosphorus 2.4 (L) 2.5 - 4.5 mg/dL    Anion Gap 9.0 5.0 - 15.0 mmol/L    BUN/Creatinine Ratio 15.3 7.0 - 25.0    eGFR Non African Amer 46 (L) >60 mL/min/1.73   CBC Auto Differential    Collection Time: 08/19/21  6:13 AM    Specimen: Blood   Result Value Ref Range    WBC 10.70 3.40 - 10.80 10*3/mm3    RBC 3.16 (L) 4.14 - 5.80 10*6/mm3    Hemoglobin 9.4 (L) 13.0 - 17.7 g/dL    Hematocrit 27.7 (L) 37.5 - 51.0 %    MCV 87.6 79.0 - 97.0 fL    MCH 29.9 26.6 - 33.0 pg    MCHC 34.1 31.5 - 35.7 g/dL    RDW 17.3 (H) 12.3 - 15.4 %    RDW-SD 52.9 37.0 - 54.0 fl    MPV 8.2 6.0 - 12.0 fL    Platelets 429 140 - 450 10*3/mm3    Neutrophil % 66.1 42.7 - 76.0 %    Lymphocyte % 17.2 (L) 19.6 - 45.3 %    Monocyte % 10.2 5.0 - 12.0 %    Eosinophil % 5.3 0.3 - 6.2 %    Basophil % 1.2 0.0 - 1.5 %    Neutrophils, Absolute 7.10 (H) 1.70 - 7.00 10*3/mm3    Lymphocytes, Absolute 1.80 0.70 - 3.10 10*3/mm3    Monocytes, Absolute 1.10 (H) 0.10 - 0.90 10*3/mm3    Eosinophils, Absolute 0.60 (H) 0.00 - 0.40 10*3/mm3    Basophils, Absolute 0.10 0.00 - 0.20 10*3/mm3    nRBC 0.1 0.0 - 0.2 /100 WBC   POC Glucose Once    Collection Time: 08/19/21  7:25 AM    Specimen: Blood   Result Value Ref Range    Glucose 236 (H) 70 - 105 mg/dL   POC Glucose Once    Collection Time: 08/19/21 11:24 AM    Specimen: Blood   Result Value Ref Range    Glucose 253 (H) 70 - 105 mg/dL        Imaging:  XR Chest 1 View  Narrative: DATE OF EXAM:  8/17/2021 4:50 AM     PROCEDURE:  XR CHEST 1 VW-     INDICATIONS:  Shortness of breath; R00.0-Tachycardia, unspecified; R06.00-Dyspnea,  unspecified     COMPARISON:  08/16/2021     TECHNIQUE:   Single radiographic AP view of the chest was obtained.     FINDINGS:  Cardiac size remains normal. There are postoperative  changes of prior  CABG. Right IJ line terminates in the SVC. There is continued pulmonary  vascular congestion and evidence of interstitial edema. There are  bilateral dependent pleural effusions with airspace disease in both lung  bases.      Impression:    1. Continued findings of congestive heart failure and interstitial  pulmonary edema.  2. Bilateral dependent pleural effusions. Bibasilar airspace disease.     Electronically Signed By-Oscar Casper MD On:8/17/2021 7:24 AM  This report was finalized on [id number] by  Oscar Casper MD.       ASSESSMENT:  Pneumonia  COPD  Bilateral pleural effusion  Atrial fibrillation with RVR   Acute on chronic systolic heart failure  CAD S/P CABG   HTN  Elevated troponin  ANNETTE uses BiPAP  Hyperlipidemia  Flaccid hemiplegia of right dominant side as late effect of cerebral infarction   DM II with neuropathy   Dementia with behavioral disturbance  Chronic venous hypertension (idiopathic) with ulcer/inflammation of BLE  Chronic foot ulcer   CKD   Tobacco use disorder   Anemia      PLAN:  Encourage OOB daily   Keep sat > 88  Bronchodilator  Inhaled corticosteroids  Electrolytes/ glycemic control  DVT and GI prophylaxis.  Nutrition: P.o. diet and supplementation with boost.  Small amounts of intake as tolerated by saturations/O2 needs    Discussed with Dr Henri Gamez, FABIOLA   8/19/2021  16:08 EDT      I personally have examined  and interviewed the patient. I have reviewed the history, data, problems, assessment and plan with our NP.  Critical care time in direct medical management (   ) minutes  Electronically signed by Marcelo Álvarez MD, D,ABS, 08/19/21, 8:12 PM EDT.

## 2021-08-19 NOTE — THERAPY TREATMENT NOTE
Subjective: Pt agreeable to therapeutic plan of care.    Objective:     Bed mobility - mod A x 1  Transfers - Min-A, Mod-A and Assist x 2  Ambulation - 10' feet CGA, Min-A, Assist x 2 and with rolling walker   Sitting Exercises:  10 reps of ankle pumps, LAQs, hip flexion, hip abd/add, GS with rest breaks after each exercise    Pain: 3 VAS low back pain  Education: Provided education on importance of mobility and skilled verbal / tactile cueing throughout intervention.     Assessment: Pt  presents with functional mobility impairments which indicate the need for skilled intervention. Tolerating session today without incident. Pt on 4L hi-missael O2.  Pt required mod A for bed mobility, min/mod A x 2 for transfers, ambulated 10' with RW with cga/min A x 2 and completed sitting BLE strengthening exercises.  O2 sats decreased to 84% with transition to sitting EOB.  DB/PLB exercises slowly rebounded sats to 93%. Will continue to follow and progress as tolerated.     Plan/Recommendations:   Pt would benefit from Inpatient Rehabilitation placement at discharge from facility   Pt desires Inpatient Rehabilitation placement at discharge. Pt cooperative; agreeable to therapeutic recommendations and plan of care.     Basic Mobility 6-click:  Rollin = Total, A lot = 2, A little = 3; 4 = None  Supine>Sit:   1 = Total, A lot = 2, A little = 3; 4 = None   Sit>Stand with arms:  1 = Total, A lot = 2, A little = 3; 4 = None  Bed>Chair:   1 = Total, A lot = 2, A little = 3; 4 = None  Ambulate in room:  1 = Total, A lot = 2, A little = 3; 4 = None  3-5 Steps with railin = Total, A lot = 2, A little = 3; 4 = None  Score: 13        Post-Tx Position: Up in Chair, Alarms activated and Call light and personal items within reach  PPE: gloves, surgical mask, eyewear protection

## 2021-08-19 NOTE — THERAPY TREATMENT NOTE
Subjective: Pt agreeable to therapeutic plan of care.    Objective:     Bed Mobility: N/A or Not attempted.  Functional Transfers: Mod-A  Functional Ambulation: CGA    Grooming: Supervision  ADL Position: supported sitting  ADL Comments: Able to brush hair with setup.      Lower Body Dressing: Dependent  ADL Position: supported sitting  ADL Comments: Pt unable to reach his feet to don sock.  He reports he uses a sock aid at home.      Pain: 0 VAS  Education: Provided education on importance of mobility and skilled verbal / tactile cueing throughout intervention.     Assessment: Benson Mclean presents with ADL impairments below baseline abilities which indicate the need for continued skilled intervention while inpatient. He  Demos general weakness in BLE and requires mod assist to stand from chair.  -130 bpm throughout therapy session.  Pt requested to return to chair due to mild dizziness.  O2 remained WFL throughout.  Tolerating session today without incident. Will continue to follow and progress as tolerated.     Plan/Recommendations:   Pt would benefit from Inpatient Rehabilitation placement at discharge from facility.   Pt desires Inpatient Rehabilitation placement at discharge. Pt cooperative; agreeable to therapeutic recommendations and plan of care.     Modified Geary: 4 = Moderately severe disability (Unable to attend to own bodily needs without assistance, and unable to walk unassisted)     Post-Tx Position: Up in Chair, Alarms activated and Call light and personal items within reach  PPE: gloves, surgical mask, eyewear protection

## 2021-08-19 NOTE — NURSING NOTE
Pt had a 4hr HD tx with a net fluid removal of 2.7L d/t asymptomatic hypotension, unable to remove 3L as initially planned. Pt had no other complications associated with HD.

## 2021-08-20 LAB
ALBUMIN SERPL-MCNC: 3.7 G/DL (ref 3.5–5.2)
ANION GAP SERPL CALCULATED.3IONS-SCNC: 11 MMOL/L (ref 5–15)
BASOPHILS # BLD AUTO: 0.1 10*3/MM3 (ref 0–0.2)
BASOPHILS NFR BLD AUTO: 1.3 % (ref 0–1.5)
BUN SERPL-MCNC: 37 MG/DL (ref 8–23)
BUN/CREAT SERPL: 14.7 (ref 7–25)
CALCIUM SPEC-SCNC: 8.6 MG/DL (ref 8.6–10.5)
CHLORIDE SERPL-SCNC: 93 MMOL/L (ref 98–107)
CO2 SERPL-SCNC: 29 MMOL/L (ref 22–29)
CREAT SERPL-MCNC: 2.52 MG/DL (ref 0.76–1.27)
DEPRECATED RDW RBC AUTO: 51.2 FL (ref 37–54)
EOSINOPHIL # BLD AUTO: 0.5 10*3/MM3 (ref 0–0.4)
EOSINOPHIL NFR BLD AUTO: 5 % (ref 0.3–6.2)
ERYTHROCYTE [DISTWIDTH] IN BLOOD BY AUTOMATED COUNT: 16.6 % (ref 12.3–15.4)
GFR SERPL CREATININE-BSD FRML MDRD: 25 ML/MIN/1.73
GLUCOSE BLDC GLUCOMTR-MCNC: 159 MG/DL (ref 70–105)
GLUCOSE BLDC GLUCOMTR-MCNC: 203 MG/DL (ref 70–105)
GLUCOSE BLDC GLUCOMTR-MCNC: 258 MG/DL (ref 70–105)
GLUCOSE SERPL-MCNC: 181 MG/DL (ref 65–99)
HCT VFR BLD AUTO: 28 % (ref 37.5–51)
HGB BLD-MCNC: 9.2 G/DL (ref 13–17.7)
LYMPHOCYTES # BLD AUTO: 2.4 10*3/MM3 (ref 0.7–3.1)
LYMPHOCYTES NFR BLD AUTO: 22.1 % (ref 19.6–45.3)
MCH RBC QN AUTO: 28.5 PG (ref 26.6–33)
MCHC RBC AUTO-ENTMCNC: 33 G/DL (ref 31.5–35.7)
MCV RBC AUTO: 86.4 FL (ref 79–97)
MONOCYTES # BLD AUTO: 1.3 10*3/MM3 (ref 0.1–0.9)
MONOCYTES NFR BLD AUTO: 12.3 % (ref 5–12)
NEUTROPHILS NFR BLD AUTO: 59.3 % (ref 42.7–76)
NEUTROPHILS NFR BLD AUTO: 6.4 10*3/MM3 (ref 1.7–7)
NRBC BLD AUTO-RTO: 0 /100 WBC (ref 0–0.2)
PHOSPHATE SERPL-MCNC: 4 MG/DL (ref 2.5–4.5)
PLATELET # BLD AUTO: 455 10*3/MM3 (ref 140–450)
PMV BLD AUTO: 7.7 FL (ref 6–12)
POTASSIUM SERPL-SCNC: 4.3 MMOL/L (ref 3.5–5.2)
RBC # BLD AUTO: 3.24 10*6/MM3 (ref 4.14–5.8)
SODIUM SERPL-SCNC: 133 MMOL/L (ref 136–145)
WBC # BLD AUTO: 10.7 10*3/MM3 (ref 3.4–10.8)

## 2021-08-20 PROCEDURE — 80069 RENAL FUNCTION PANEL: CPT | Performed by: INTERNAL MEDICINE

## 2021-08-20 PROCEDURE — 5A1D70Z PERFORMANCE OF URINARY FILTRATION, INTERMITTENT, LESS THAN 6 HOURS PER DAY: ICD-10-PCS | Performed by: INTERNAL MEDICINE

## 2021-08-20 PROCEDURE — 99232 SBSQ HOSP IP/OBS MODERATE 35: CPT | Performed by: INTERNAL MEDICINE

## 2021-08-20 PROCEDURE — 94799 UNLISTED PULMONARY SVC/PX: CPT

## 2021-08-20 PROCEDURE — 94660 CPAP INITIATION&MGMT: CPT

## 2021-08-20 PROCEDURE — 85025 COMPLETE CBC W/AUTO DIFF WBC: CPT | Performed by: INTERNAL MEDICINE

## 2021-08-20 PROCEDURE — 99232 SBSQ HOSP IP/OBS MODERATE 35: CPT | Performed by: HOSPITALIST

## 2021-08-20 PROCEDURE — 97110 THERAPEUTIC EXERCISES: CPT

## 2021-08-20 PROCEDURE — 25010000002 EPOETIN ALFA-EPBX 10000 UNIT/ML SOLUTION: Performed by: INTERNAL MEDICINE

## 2021-08-20 PROCEDURE — 97530 THERAPEUTIC ACTIVITIES: CPT

## 2021-08-20 PROCEDURE — 82962 GLUCOSE BLOOD TEST: CPT

## 2021-08-20 PROCEDURE — 63710000001 INSULIN LISPRO (HUMAN) PER 5 UNITS: Performed by: HOSPITALIST

## 2021-08-20 PROCEDURE — 97116 GAIT TRAINING THERAPY: CPT

## 2021-08-20 RX ADMIN — DULOXETINE 60 MG: 30 CAPSULE, DELAYED RELEASE ORAL at 15:55

## 2021-08-20 RX ADMIN — INSULIN LISPRO 12 UNITS: 100 INJECTION, SOLUTION INTRAVENOUS; SUBCUTANEOUS at 21:39

## 2021-08-20 RX ADMIN — IPRATROPIUM BROMIDE AND ALBUTEROL SULFATE 3 ML: 2.5; .5 SOLUTION RESPIRATORY (INHALATION) at 16:13

## 2021-08-20 RX ADMIN — BUDESONIDE 0.5 MG: 0.5 SUSPENSION RESPIRATORY (INHALATION) at 07:25

## 2021-08-20 RX ADMIN — DONEPEZIL HYDROCHLORIDE 10 MG: 5 TABLET, FILM COATED ORAL at 21:39

## 2021-08-20 RX ADMIN — ASPIRIN 81 MG: 81 TABLET, CHEWABLE ORAL at 15:59

## 2021-08-20 RX ADMIN — BUMETANIDE 2 MG: 0.25 INJECTION INTRAMUSCULAR; INTRAVENOUS at 22:31

## 2021-08-20 RX ADMIN — BUMETANIDE 2 MG: 0.25 INJECTION INTRAMUSCULAR; INTRAVENOUS at 15:56

## 2021-08-20 RX ADMIN — ATORVASTATIN CALCIUM 40 MG: 40 TABLET, FILM COATED ORAL at 21:40

## 2021-08-20 RX ADMIN — Medication 10 ML: at 16:01

## 2021-08-20 RX ADMIN — ISOSORBIDE MONONITRATE 60 MG: 60 TABLET, EXTENDED RELEASE ORAL at 18:21

## 2021-08-20 RX ADMIN — Medication 1000 UNITS: at 15:56

## 2021-08-20 RX ADMIN — BUDESONIDE 0.5 MG: 0.5 SUSPENSION RESPIRATORY (INHALATION) at 20:23

## 2021-08-20 RX ADMIN — IPRATROPIUM BROMIDE AND ALBUTEROL SULFATE 3 ML: 2.5; .5 SOLUTION RESPIRATORY (INHALATION) at 20:15

## 2021-08-20 RX ADMIN — IPRATROPIUM BROMIDE AND ALBUTEROL SULFATE 3 ML: 2.5; .5 SOLUTION RESPIRATORY (INHALATION) at 07:21

## 2021-08-20 RX ADMIN — INSULIN LISPRO 8 UNITS: 100 INJECTION, SOLUTION INTRAVENOUS; SUBCUTANEOUS at 08:43

## 2021-08-20 RX ADMIN — EPOETIN ALFA-EPBX 20000 UNITS: 10000 INJECTION, SOLUTION INTRAVENOUS; SUBCUTANEOUS at 14:00

## 2021-08-20 RX ADMIN — METOLAZONE 5 MG: 5 TABLET ORAL at 15:56

## 2021-08-20 RX ADMIN — AMIODARONE HYDROCHLORIDE 200 MG: 200 TABLET ORAL at 21:40

## 2021-08-20 RX ADMIN — IPRATROPIUM BROMIDE AND ALBUTEROL SULFATE 3 ML: 2.5; .5 SOLUTION RESPIRATORY (INHALATION) at 00:36

## 2021-08-20 RX ADMIN — CYANOCOBALAMIN TAB 1000 MCG 1000 MCG: 1000 TAB at 15:56

## 2021-08-20 RX ADMIN — Medication 10 ML: at 21:47

## 2021-08-20 RX ADMIN — DILTIAZEM HYDROCHLORIDE 240 MG: 240 CAPSULE, COATED, EXTENDED RELEASE ORAL at 08:38

## 2021-08-20 RX ADMIN — INSULIN LISPRO 4 UNITS: 100 INJECTION, SOLUTION INTRAVENOUS; SUBCUTANEOUS at 18:21

## 2021-08-20 NOTE — THERAPY TREATMENT NOTE
Subjective: Pt agreeable to therapeutic plan of care.    Objective:     Bed mobility - Mod-A supine to sit to supine  Transfers - Min-A and with rolling walker  Sit to stand from eob, from bs chair 2 times.    Ambulation - 10 feet x's 2 and 5 feet Min-A and with rolling walker   Pt sat up in the chair for about one hour to eat his meal.  Returned to room to transfer pt back to bed.   Therapeutic exercises:   Heel slides, ap, hip abduction, quad sets, glute sets, laq's  1/10 reps actively    Pain: 0 VAS  Education: Provided education on importance of mobility and skilled verbal / tactile cueing throughout intervention.     Assessment: Benson Mclean presents with functional mobility impairments which indicate the need for skilled intervention. Tolerating session today without incident. Pt requiring less assist to transfer out of the bed.  Pt was able to jodi being up on his feet with walker for longer periods.  Pt is still far from his PLOF but has good potential to cont to improve his mobility.   Will continue to follow and progress as tolerated.     Plan/Recommendations:   Pt would benefit from Inpatient Rehabilitation placement at discharge from facility and requires no DME at discharge.   Pt desires Inpatient Rehabilitation placement at discharge. Pt cooperative; agreeable to therapeutic recommendations and plan of care.     Basic Mobility 6-click:  Rollin = Total, A lot = 2, A little = 3; 4 = None  Supine>Sit:   1 = Total, A lot = 2, A little = 3; 4 = None   Sit>Stand with arms:  1 = Total, A lot = 2, A little = 3; 4 = None  Bed>Chair:   1 = Total, A lot = 2, A little = 3; 4 = None  Ambulate in room:  1 = Total, A lot = 2, A little = 3; 4 = None  3-5 Steps with railin = Total, A lot = 2, A little = 3; 4 = None  Score: 14    Modified Fredy: 4 = Moderately severe disability (Unable to attend to own bodily needs without assistance, and unable to walk unassisted)     Post-Tx Position: Supine with  HOB Elevated, Alarms activated and Call light and personal items within reach  PPE: gloves, surgical mask, eyewear protection

## 2021-08-20 NOTE — PROGRESS NOTES
[facility] Medicine Services Daily Progress Note    Patient Name: Benson Mclean  : 1950  MRN: [id number]  Primary Care Physician:  Samantha Zabala APRN  Date of admission: 8/3/2021      Subjective      Chief Complaint: Shortness of breath.      Patient Reports     21: s/p HD yesterday.  Feels better.  On 65% FiO2.  Informed the patient that he is getting decreased insulin dose to minimize hypoglycemia.  21: Feels better.  Planned HD today.  On 50% FiO2.  21: Feels better.    Review of Systems   All other systems reviewed and are negative.         Objective      Vitals:   Temp:  [98.3 °F (36.8 °C)-98.4 °F (36.9 °C)] 98.4 °F (36.9 °C)  Heart Rate:  [] 92  Resp:  [16-20] 17  BP: ()/(50-59) 97/59  Flow (L/min):  [2.5-4] 2.5    Physical Exam  Constitutional:       General: He is not in acute distress.  HENT:      Head: Normocephalic and atraumatic.      Nose: Nose normal.   Eyes:      General: No scleral icterus.     Extraocular Movements: Extraocular movements intact.      Pupils: Pupils are equal, round, and reactive to light.   Cardiovascular:      Rate and Rhythm: Normal rate and regular rhythm.   Pulmonary:      Effort: Pulmonary effort is normal.      Breath sounds: Examination of the right-lower field reveals rales. Rales present.   Abdominal:      General: Bowel sounds are normal.      Comments: Obese abdomen.   Musculoskeletal:      Cervical back: Normal range of motion.      Comments: Decreased range of motion hips   Lymphadenopathy:      Cervical: No cervical adenopathy.   Skin:     General: Skin is warm.      Findings: No rash.   Neurological:      Mental Status: He is alert.      Cranial Nerves: Cranial nerves are intact.   Psychiatric:         Mood and Affect: Mood normal.           Result Review    Result Review:  I have personally reviewed the results from the time of this admission to 2021 13:13 EDT and agree with these findings:  [x]   Laboratory  []  Microbiology  []  Radiology  []  EKG/Telemetry   [x]  Cardiology/Vascular   []  Pathology  [x]  Old records  []  Other:            Assessment/Plan      Brief Patient Summary:    70 years old male admitted for A. fib RVR and eventual transfer to ICU for worsening hypoxemia and eventual initiation on hemodialysis for worsening DILEEP.       amiodarone, 200 mg, Oral, Q12H  apixaban, 5 mg, Oral, Q12H  aspirin, 81 mg, Oral, Daily  atorvastatin, 40 mg, Oral, Nightly  budesonide, 0.5 mg, Nebulization, BID - RT  bumetanide, 2 mg, Intravenous, Q12H  carvedilol, 12.5 mg, Oral, Q12H  cholecalciferol, 1,000 Units, Oral, Daily  dilTIAZem CD, 240 mg, Oral, Q24H  donepezil, 10 mg, Oral, Nightly  DULoxetine, 60 mg, Oral, Daily  epoetin izabella-epbx, 20,000 Units, Subcutaneous, Weekly  insulin lispro, 0-24 Units, Subcutaneous, 4x Daily With Meals & Nightly  ipratropium-albuterol, 3 mL, Nebulization, Q4H - RT  isosorbide mononitrate, 60 mg, Oral, BID - Nitrates  metOLazone, 5 mg, Oral, Daily  sodium chloride, 10 mL, Intravenous, Q12H  vitamin B-12, 1,000 mcg, Oral, Daily             Active Hospital Problems:  Active Hospital Problems    Diagnosis    • **Acute on chronic systolic heart failure (CMS/HCC)    • Essential hypertension    • S/P CABG (coronary artery bypass graft)    • Acute respiratory failure with hypoxia and hypercapnia (CMS/[facility])    • Anemia of renal disease    • Acute kidney injury (CMS/[facility])    • CKD (chronic kidney disease) stage 3, GFR 30-59 ml/min (CMS/HCC)    • Type 2 diabetes mellitus with hyperglycemia (CMS/HCC)    • Mixed hyperlipidemia    • Obesity    • History of cerebrovascular accident    • Chronic obstructive pulmonary disease, unspecified (CMS/HCC)    • Flaccid hemiplegia of right dominant side as late effect of cerebral infarction (CMS/HCC)    • Unspecified dementia with behavioral disturbance (CMS/HCC)    • Major depressive disorder, recurrent, mild (CMS/HCC)    • Paroxysmal atrial fibrillation  (CMS/HCC)    • Elevated troponin    • Chronic venous hypertension (idiopathic) with ulcer and inflammation of bilateral lower extremity (CMS/HCC)    • Chronic foot ulcer (CMS/HCC)    • Coronary artery disease    • Vitamin D deficiency    • ANNETTE treated with BiPAP    • Diabetic neuropathy (CMS/HCC)           Acute on chronic systolic heart failure   -Improved with hemodialysis and diuretics  -on diuretics, BB, CCB, and Imdur   -ACE-I/ARB contraindicated d/t DILEEP     Acute respiratory failure with hypoxia and hypercapnia: Improving  -secondary to fluid overload and reason for transfer to ICU on 8/14/2020  -Continue precision flow  -titrate O2 to keep sat >90%  -AVAPS PRN  -pulmonary following     Acute kidney injury, CKD (chronic kidney disease) stage 3, (POA)  -nephrology following  -Right IJ Shiley inserted and hemodialysis initiated on 08/14/21  -s/p HD 8/16        Elevated troponin:  -Denies chest pain  -likely demand ischemia  -cardiology following     Paroxysmal atrial fibrillation with RVR (POA)  -rate controlled  -continue amiodarone, diltiazem CD, and Eliquis  -Cardiology following     Coronary artery disease, S/P CABG / Essential hypertension, chronic / Mixed hyperlipidemia  -continue aspirin, Eliquis, statin, Bumex, carvedilol, diltiazem CD, hydralazine, Imdur, and metolazone  -monitor BP     ANNETTE:  -Claims to be compliant with CPAP at home  -currently using hospital BiPAP     Major depressive disorder:  -continue Cymbalta      History of cerebrovascular accident  -Flaccid hemiplegia of right dominant side as late effect of cerebral infarction   -fall precautions  -continue aspirin, Elqius, and statin      Type 2 diabetes mellitus with hyperglycemia complicated with Diabetic neuropathy   -A1c 7.4% on 07/21/21  -Continue ISS     dementia with behavioral disturbance  -continue donepezil      Obesity  -BMI 37.39   -encourage lifestyle modifications      Vitamin D deficiency  -continue cholecalciferol      Chronic  obstructive pulmonary disease, unspecified, former smoker  -stable without exacerbation  -continue bronchodilators      Anemia of chronic disease   -on weekly Retacrit   -Monitor H&H    DVT prophylaxis:  Medical DVT prophylaxis orders are present.    CODE STATUS:    Code Status: CPR  Medical Interventions (Level of Support Prior to Arrest): Full      Disposition:  I expect patient to be discharged rehab.    This patient has been examined wearing appropriate Personal Protective Equipment and discussed with nursing. 08/20/21      Electronically signed by Maurilio Boucher DO, 08/20/21, 13:13 EDT.  [facility] Hospitalist Team

## 2021-08-20 NOTE — PROGRESS NOTES
"RENAL/KCC:     LOS: 17 days    Patient Care Team:  Samantha Zabala APRN as PCP - General  Samantha Zabala APRN as PCP - Family Medicine  Jose G Rm MD as Consulting Physician (Cardiology)    Chief Complaint:  Palpitations    Subjective     Interval History:   Seen on HD this AM.  UOP diminished.    Objective     Vital Sign Min/Max for last 24 hours  Temp  Min: 98.3 °F (36.8 °C)  Max: 98.4 °F (36.9 °C)   BP  Min: 91/58  Max: 109/58   Pulse  Min: 86  Max: 120   Resp  Min: 16  Max: 20   SpO2  Min: 94 %  Max: 98 %   Flow (L/min)  Min: 2.5  Max: 4   Weight  Min: 106 kg (234 lb 2.1 oz)  Max: 106 kg (234 lb 2.1 oz)     Flowsheet Rows      First Filed Value   Admission Height  180.3 cm (71\") Documented at 08/03/2021 1733   Admission Weight  113 kg (250 lb) Documented at 08/03/2021 1733          No intake/output data recorded.  I/O last 3 completed shifts:  In: 1062 [P.O.:462; I.V.:600]  Out: 3400 [Urine:100; Other:3300]    Physical Exam:  GEN: Awake, NAD  ENT: PERRL, EOMI, MMM  NECK: Supple, no JVD  CHEST: CTAB, no W/R/C  CV: RRR, no M/G/R  ABD: Soft, NT, +BS  SKIN: Warm and Dry  NEURO: CN's intact      WBC WBC   Date Value Ref Range Status   08/20/2021 10.70 3.40 - 10.80 10*3/mm3 Final   08/19/2021 10.70 3.40 - 10.80 10*3/mm3 Final   08/18/2021 10.00 3.40 - 10.80 10*3/mm3 Final      HGB Hemoglobin   Date Value Ref Range Status   08/20/2021 9.2 (L) 13.0 - 17.7 g/dL Final   08/19/2021 9.4 (L) 13.0 - 17.7 g/dL Final   08/18/2021 8.6 (L) 13.0 - 17.7 g/dL Final      HCT Hematocrit   Date Value Ref Range Status   08/20/2021 28.0 (L) 37.5 - 51.0 % Final   08/19/2021 27.7 (L) 37.5 - 51.0 % Final   08/18/2021 26.0 (L) 37.5 - 51.0 % Final      Platlets No results found for: LABPLAT   MCV MCV   Date Value Ref Range Status   08/20/2021 86.4 79.0 - 97.0 fL Final   08/19/2021 87.6 79.0 - 97.0 fL Final   08/18/2021 86.5 79.0 - 97.0 fL Final          Sodium Sodium   Date Value Ref Range Status   08/20/2021 133 (L) 136 - 145 mmol/L " Final   08/19/2021 134 (L) 136 - 145 mmol/L Final   08/18/2021 137 136 - 145 mmol/L Final      Potassium Potassium   Date Value Ref Range Status   08/20/2021 4.3 3.5 - 5.2 mmol/L Final   08/19/2021 3.7 3.5 - 5.2 mmol/L Final   08/18/2021 4.3 3.5 - 5.2 mmol/L Final     Comment:     Result checked       Chloride Chloride   Date Value Ref Range Status   08/20/2021 93 (L) 98 - 107 mmol/L Final   08/19/2021 94 (L) 98 - 107 mmol/L Final   08/18/2021 97 (L) 98 - 107 mmol/L Final      CO2 CO2   Date Value Ref Range Status   08/20/2021 29.0 22.0 - 29.0 mmol/L Final   08/19/2021 31.0 (H) 22.0 - 29.0 mmol/L Final   08/18/2021 28.0 22.0 - 29.0 mmol/L Final      BUN BUN   Date Value Ref Range Status   08/20/2021 37 (H) 8 - 23 mg/dL Final   08/19/2021 23 8 - 23 mg/dL Final   08/18/2021 49 (H) 8 - 23 mg/dL Final      Creatinine Creatinine   Date Value Ref Range Status   08/20/2021 2.52 (H) 0.76 - 1.27 mg/dL Final   08/19/2021 1.50 (H) 0.76 - 1.27 mg/dL Final   08/18/2021 2.48 (H) 0.76 - 1.27 mg/dL Final      Calcium Calcium   Date Value Ref Range Status   08/20/2021 8.6 8.6 - 10.5 mg/dL Final   08/19/2021 8.6 8.6 - 10.5 mg/dL Final   08/18/2021 8.6 8.6 - 10.5 mg/dL Final      PO4 No results found for: CAPO4   Albumin Albumin   Date Value Ref Range Status   08/20/2021 3.70 3.50 - 5.20 g/dL Final   08/19/2021 3.90 3.50 - 5.20 g/dL Final   08/18/2021 3.40 (L) 3.50 - 5.20 g/dL Final      Magnesium Magnesium   Date Value Ref Range Status   08/18/2021 1.9 1.6 - 2.4 mg/dL Final      Uric Acid No results found for: URICACID        Results Review:     I reviewed the patient's new clinical results.    amiodarone, 200 mg, Oral, Q12H  apixaban, 5 mg, Oral, Q12H  aspirin, 81 mg, Oral, Daily  atorvastatin, 40 mg, Oral, Nightly  budesonide, 0.5 mg, Nebulization, BID - RT  bumetanide, 2 mg, Intravenous, Q12H  carvedilol, 12.5 mg, Oral, Q12H  cholecalciferol, 1,000 Units, Oral, Daily  dilTIAZem CD, 240 mg, Oral, Q24H  donepezil, 10 mg, Oral,  Nightly  DULoxetine, 60 mg, Oral, Daily  epoetin izabella-epbx, 20,000 Units, Subcutaneous, Weekly  insulin lispro, 0-24 Units, Subcutaneous, 4x Daily With Meals & Nightly  ipratropium-albuterol, 3 mL, Nebulization, Q4H - RT  isosorbide mononitrate, 60 mg, Oral, BID - Nitrates  metOLazone, 5 mg, Oral, Daily  sodium chloride, 10 mL, Intravenous, Q12H  vitamin B-12, 1,000 mcg, Oral, Daily           Medication Review: Reviewed    Assessment/Plan       Acute on chronic systolic heart failure (CMS/HCC)    S/P CABG (coronary artery bypass graft)    Essential hypertension    Elevated troponin    ANNETTE treated with BiPAP    Major depressive disorder, recurrent, mild (CMS/[facility])    Mixed hyperlipidemia    History of cerebrovascular accident    Flaccid hemiplegia of right dominant side as late effect of cerebral infarction (CMS/[facility])    Diabetic neuropathy (CMS/[facility])    Unspecified dementia with behavioral disturbance (CMS/[facility])    Coronary artery disease    Obesity    Vitamin D deficiency    Chronic venous hypertension (idiopathic) with ulcer and inflammation of bilateral lower extremity (CMS/[facility])    Chronic obstructive pulmonary disease, unspecified (CMS/[facility])    Chronic foot ulcer (CMS/[facility])    Paroxysmal atrial fibrillation (CMS/[facility])    Acute kidney injury (CMS/[facility])    Type 2 diabetes mellitus with hyperglycemia (CMS/[facility])    CKD (chronic kidney disease) stage 3, GFR 30-59 ml/min (CMS/[facility])    Anemia of renal disease    Acute respiratory failure with hypoxia and hypercapnia (CMS/[facility])      1. DILEEP  2. CKD III  3. Hyperkalemia  4. Pulmonary edema  5. Tachycardia  6. CHF  7. T2DM  8. Hypertension  9. Anemia     PLAN:  HD MWF.  Will plan permcath today.  Social work to look for outpatient HD spot for acute renal failure.  Check bladder scan to ensure no retention.  Will reassess in AM.         Yony Bhat M.D.  [facility]

## 2021-08-20 NOTE — PLAN OF CARE
Patient's vitals stable.  He is tolerating 3 L of 02 and maintaining sats.  He remains a high falls, bed alarm set.    Problem: Fall Injury Risk  Goal: Absence of Fall and Fall-Related Injury  Intervention: Identify and Manage Contributors to Fall Injury Risk  Recent Flowsheet Documentation  Taken 8/19/2021 2000 by Meme Chavez RN  Medication Review/Management: medications reviewed  Intervention: Promote Injury-Free Environment  Recent Flowsheet Documentation  Taken 8/20/2021 0307 by Meme Chavez RN  Safety Promotion/Fall Prevention:   safety round/check completed   room organization consistent   nonskid shoes/slippers when out of bed   activity supervised   assistive device/personal items within reach   clutter free environment maintained   elopement precautions   fall prevention program maintained  Taken 8/20/2021 0000 by Meme Chavez RN  Safety Promotion/Fall Prevention:   safety round/check completed   room organization consistent   nonskid shoes/slippers when out of bed   activity supervised   assistive device/personal items within reach   clutter free environment maintained   elopement precautions   fall prevention program maintained  Taken 8/19/2021 2000 by Meme Chavez RN  Safety Promotion/Fall Prevention:   nonskid shoes/slippers when out of bed   room organization consistent   safety round/check completed   activity supervised   clutter free environment maintained   assistive device/personal items within reach   elopement precautions   fall prevention program maintained     Problem: Adult Inpatient Plan of Care  Goal: Absence of Hospital-Acquired Illness or Injury  Intervention: Identify and Manage Fall Risk  Recent Flowsheet Documentation  Taken 8/20/2021 0307 by Meme Chavez RN  Safety Promotion/Fall Prevention:   safety round/check completed   room organization consistent   nonskid shoes/slippers when out of bed   activity supervised   assistive device/personal items within  reach   clutter free environment maintained   elopement precautions   fall prevention program maintained  Taken 8/20/2021 0000 by Meme Chavez RN  Safety Promotion/Fall Prevention:   safety round/check completed   room organization consistent   nonskid shoes/slippers when out of bed   activity supervised   assistive device/personal items within reach   clutter free environment maintained   elopement precautions   fall prevention program maintained  Taken 8/19/2021 2000 by Meme Chavez RN  Safety Promotion/Fall Prevention:   nonskid shoes/slippers when out of bed   room organization consistent   safety round/check completed   activity supervised   clutter free environment maintained   assistive device/personal items within reach   elopement precautions   fall prevention program maintained  Intervention: Prevent Skin Injury  Recent Flowsheet Documentation  Taken 8/20/2021 0307 by Meme Chavez RN  Skin Protection:   adhesive use limited   incontinence pads utilized  Taken 8/20/2021 0000 by Meme Chavez RN  Skin Protection:   adhesive use limited   incontinence pads utilized  Taken 8/19/2021 2000 by Meme Chavez RN  Skin Protection:   adhesive use limited   incontinence pads utilized  Goal: Optimal Comfort and Wellbeing  Intervention: Provide Person-Centered Care  Recent Flowsheet Documentation  Taken 8/20/2021 0307 by Meme Chavez RN  Trust Relationship/Rapport:   care explained   questions answered   questions encouraged   thoughts/feelings acknowledged  Taken 8/19/2021 2000 by Meme Chavez RN  Trust Relationship/Rapport:   care explained   questions answered   questions encouraged   thoughts/feelings acknowledged     Problem: Skin Injury Risk Increased  Goal: Skin Health and Integrity  Intervention: Optimize Skin Protection  Recent Flowsheet Documentation  Taken 8/20/2021 0307 by Meme Chavez RN  Pressure Reduction Techniques:   frequent weight shift encouraged   weight shift  assistance provided  Head of Bed (HOB): HOB elevated  Pressure Reduction Devices: pressure-redistributing mattress utilized  Skin Protection:   adhesive use limited   incontinence pads utilized  Taken 8/20/2021 0000 by Meme Chavez RN  Pressure Reduction Techniques:   frequent weight shift encouraged   weight shift assistance provided  Head of Bed (HOB): HOB elevated  Pressure Reduction Devices: pressure-redistributing mattress utilized  Skin Protection:   adhesive use limited   incontinence pads utilized  Taken 8/19/2021 2000 by Meme Chavez RN  Pressure Reduction Techniques:   frequent weight shift encouraged   weight shift assistance provided  Head of Bed (HOB): HOB elevated  Pressure Reduction Devices: pressure-redistributing mattress utilized  Skin Protection:   adhesive use limited   incontinence pads utilized     Problem: Breathing Pattern Ineffective  Goal: Effective Breathing Pattern  Intervention: Promote Improved Breathing Pattern  Recent Flowsheet Documentation  Taken 8/20/2021 0307 by Meme Chavez RN  Supportive Measures: active listening utilized  Head of Bed (HOB): HOB elevated  Taken 8/20/2021 0000 by Meme Chavez RN  Supportive Measures: active listening utilized  Head of Bed (HOB): HOB elevated  Taken 8/19/2021 2000 by Meme Chavez RN  Supportive Measures: active listening utilized  Head of Bed (HOB): HOB elevated     Problem: Fluid Volume Excess  Goal: Fluid Balance  Intervention: Monitor and Manage Hypervolemia  Recent Flowsheet Documentation  Taken 8/20/2021 0307 by Meme Chavez RN  Skin Protection:   adhesive use limited   incontinence pads utilized  Taken 8/20/2021 0000 by Meme Chavez RN  Skin Protection:   adhesive use limited   incontinence pads utilized  Taken 8/19/2021 2000 by Meme Chavez RN  Skin Protection:   adhesive use limited   incontinence pads utilized   Goal Outcome Evaluation:

## 2021-08-20 NOTE — PROGRESS NOTES
[facility] Daily Progress Note    Patient Name: Benson Mclean  : 1950  MRN: [id number]  Primary Care Physician:  Samantha Zabala APRN  Date of admission: 8/3/2021      Subjective      Chief Complaint: Shortness of breath.      Patient Reports     21: s/p HD yesterday.  Feels better.  On 65% FiO2.  Informed the patient that he is getting decreased insulin dose to minimize hypoglycemia.  21: Feels better.  Planned HD today.  On 50% FiO2.  21: Feels better.  21: OOB to chair.  No complaints.  Permacath to be placed today.  Case management working on arranging dialysis chair.    Review of Systems   All other systems reviewed and are negative.         Objective      Vitals:   Temp:  [98.3 °F (36.8 °C)-98.4 °F (36.9 °C)] 98.4 °F (36.9 °C)  Heart Rate:  [] 92  Resp:  [16-20] 17  BP: ()/(50-59) 97/59  Flow (L/min):  [2.5-4] 2.5    Physical Exam  Constitutional:       General: He is not in acute distress.  HENT:      Head: Normocephalic and atraumatic.      Nose: Nose normal.   Eyes:      General: No scleral icterus.     Extraocular Movements: Extraocular movements intact.      Pupils: Pupils are equal, round, and reactive to light.   Cardiovascular:      Rate and Rhythm: Normal rate and regular rhythm.   Pulmonary:      Effort: Pulmonary effort is normal.      Breath sounds: Examination of the right-lower field reveals decreased breath sounds. Examination of the left-lower field reveals decreased breath sounds. Decreased breath sounds present.   Abdominal:      General: Bowel sounds are normal.      Comments: Obese abdomen.   Musculoskeletal:      Cervical back: Normal range of motion.      Comments: Decreased range of motion hips   Lymphadenopathy:      Cervical: No cervical adenopathy.   Skin:     General: Skin is warm.      Findings: No rash.   Neurological:      Mental Status: He is alert.      Cranial Nerves: Cranial nerves are intact.    Psychiatric:         Mood and Affect: Mood normal.           Result Review    Result Review:  I have personally reviewed the results from the time of this admission to 8/20/2021 13:14 EDT and agree with these findings:  [x]  Laboratory  []  Microbiology  []  Radiology  []  EKG/Telemetry   [x]  Cardiology/Vascular   []  Pathology  [x]  Old records  []  Other:            Assessment/Plan      Brief Patient Summary:    70 years old male admitted for A. fib RVR and eventual transfer to ICU for worsening hypoxemia and eventual initiation on hemodialysis for worsening DILEEP.       amiodarone, 200 mg, Oral, Q12H  apixaban, 5 mg, Oral, Q12H  aspirin, 81 mg, Oral, Daily  atorvastatin, 40 mg, Oral, Nightly  budesonide, 0.5 mg, Nebulization, BID - RT  bumetanide, 2 mg, Intravenous, Q12H  carvedilol, 12.5 mg, Oral, Q12H  cholecalciferol, 1,000 Units, Oral, Daily  dilTIAZem CD, 240 mg, Oral, Q24H  donepezil, 10 mg, Oral, Nightly  DULoxetine, 60 mg, Oral, Daily  epoetin izabella-epbx, 20,000 Units, Subcutaneous, Weekly  insulin lispro, 0-24 Units, Subcutaneous, 4x Daily With Meals & Nightly  ipratropium-albuterol, 3 mL, Nebulization, Q4H - RT  isosorbide mononitrate, 60 mg, Oral, BID - Nitrates  metOLazone, 5 mg, Oral, Daily  sodium chloride, 10 mL, Intravenous, Q12H  vitamin B-12, 1,000 mcg, Oral, Daily             Active Hospital Problems:  Active Hospital Problems    Diagnosis    • **Acute on chronic systolic heart failure (CMS/HCC)    • Essential hypertension    • S/P CABG (coronary artery bypass graft)    • Acute respiratory failure with hypoxia and hypercapnia (CMS/[facility])    • Anemia of renal disease    • Acute kidney injury (CMS/[facility])    • CKD (chronic kidney disease) stage 3, GFR 30-59 ml/min (CMS/[facility])    • Type 2 diabetes mellitus with hyperglycemia (CMS/[facility])    • Mixed hyperlipidemia    • Obesity    • History of cerebrovascular accident    • Chronic obstructive pulmonary disease, unspecified (CMS/[facility])    • Flaccid hemiplegia of  right dominant side as late effect of cerebral infarction (CMS/HCC)    • Unspecified dementia with behavioral disturbance (CMS/HCC)    • Major depressive disorder, recurrent, mild (CMS/HCC)    • Paroxysmal atrial fibrillation (CMS/HCC)    • Elevated troponin    • Chronic venous hypertension (idiopathic) with ulcer and inflammation of bilateral lower extremity (CMS/HCC)    • Chronic foot ulcer (CMS/HCC)    • Coronary artery disease    • Vitamin D deficiency    • ANNETTE treated with BiPAP    • Diabetic neuropathy (CMS/HCC)           Acute on chronic systolic heart failure   -Improved with hemodialysis and diuretics  -on diuretics, BB, CCB, and Imdur   -ACE-I/ARB contraindicated d/t DILEEP     Acute respiratory failure with hypoxia and hypercapnia: Improving  -secondary to fluid overload and reason for transfer to ICU on 8/14/2020  -Continue precision flow  -titrate O2 to keep sat >90%  -AVAPS PRN  -pulmonary following     Acute kidney injury, CKD (chronic kidney disease) stage 3, (POA)  -nephrology following  -Right IJ Shiley inserted and hemodialysis initiated on 08/14/21  -Permacath 8/20/2021        Elevated troponin:  -Denies chest pain  -likely demand ischemia  -cardiology following     Paroxysmal atrial fibrillation with RVR (POA)  -rate controlled  -continue amiodarone, diltiazem CD, and Eliquis  -Cardiology following     Coronary artery disease, S/P CABG / Essential hypertension, chronic / Mixed hyperlipidemia  -continue aspirin, Eliquis, statin, Bumex, carvedilol, diltiazem CD, hydralazine, Imdur, and metolazone  -monitor BP     ANNETTE:  -Claims to be compliant with CPAP at home  -currently using hospital BiPAP     Major depressive disorder:  -continue Cymbalta      History of cerebrovascular accident  -Flaccid hemiplegia of right dominant side as late effect of cerebral infarction   -fall precautions  -continue aspirin, Elqius, and statin      Type 2 diabetes mellitus with hyperglycemia complicated with Diabetic  neuropathy   -A1c 7.4% on 07/21/21  -Continue ISS     dementia with behavioral disturbance  -continue donepezil      Obesity  -BMI 37.39   -encourage lifestyle modifications      Vitamin D deficiency  -continue cholecalciferol      Chronic obstructive pulmonary disease, unspecified, former smoker  -stable without exacerbation  -continue bronchodilators      Anemia of chronic disease   -on weekly Retacrit   -Monitor H&H    DVT prophylaxis:  Medical DVT prophylaxis orders are present.    CODE STATUS:    Code Status: CPR  Medical Interventions (Level of Support Prior to Arrest): Full      Disposition:  I expect patient to be discharged rehab.    This patient has been examined wearing appropriate Personal Protective Equipment and discussed with nursing. 08/20/21      Electronically signed by Maurilio Boucher DO, 08/20/21, 13:14 EDT.  [facility] Hospitalist Team

## 2021-08-20 NOTE — PLAN OF CARE
Problem: Arrhythmia/Dysrhythmia  Goal: Normalized Cardiac Rhythm  Outcome: Ongoing, Progressing     Problem: Fall Injury Risk  Goal: Absence of Fall and Fall-Related Injury  Outcome: Ongoing, Progressing  Intervention: Identify and Manage Contributors to Fall Injury Risk  Recent Flowsheet Documentation  Taken 8/20/2021 1645 by Umm Garcia RN  Medication Review/Management: medications reviewed  Self-Care Promotion: independence encouraged  Taken 8/20/2021 1535 by Umm Garcia RN  Self-Care Promotion: independence encouraged  Taken 8/20/2021 0815 by Umm Garcia RN  Medication Review/Management: medications reviewed  Self-Care Promotion: independence encouraged  Intervention: Promote Injury-Free Environment  Recent Flowsheet Documentation  Taken 8/20/2021 1800 by Umm Garcia RN  Safety Promotion/Fall Prevention: safety round/check completed  Taken 8/20/2021 1645 by Umm Garcia RN  Safety Promotion/Fall Prevention:   assistive device/personal items within reach   clutter free environment maintained   fall prevention program maintained   nonskid shoes/slippers when out of bed   room organization consistent   safety round/check completed  Taken 8/20/2021 1600 by Umm Garcia RN  Safety Promotion/Fall Prevention: safety round/check completed  Taken 8/20/2021 1535 by Umm Garcia RN  Safety Promotion/Fall Prevention:   assistive device/personal items within reach   clutter free environment maintained   fall prevention program maintained   nonskid shoes/slippers when out of bed   room organization consistent   safety round/check completed  Taken 8/20/2021 1400 by Umm Garcia RN  Safety Promotion/Fall Prevention: (At dialysis) patient off unit  Taken 8/20/2021 1200 by Umm Garcia RN  Safety Promotion/Fall Prevention: (At dialysis) patient off unit  Taken 8/20/2021 1000 by Umm Garcia RN  Safety Promotion/Fall Prevention: (At dialysis) patient off unit  Taken  8/20/2021 0815 by Umm Garcia RN  Safety Promotion/Fall Prevention:   assistive device/personal items within reach   clutter free environment maintained   fall prevention program maintained   nonskid shoes/slippers when out of bed   room organization consistent   safety round/check completed  Taken 8/20/2021 0800 by Umm Garcia RN  Safety Promotion/Fall Prevention: safety round/check completed     Problem: Adult Inpatient Plan of Care  Goal: Plan of Care Review  Outcome: Ongoing, Progressing  Goal: Patient-Specific Goal (Individualized)  Outcome: Ongoing, Progressing  Goal: Absence of Hospital-Acquired Illness or Injury  Outcome: Ongoing, Progressing  Intervention: Identify and Manage Fall Risk  Recent Flowsheet Documentation  Taken 8/20/2021 1800 by Umm Garcia RN  Safety Promotion/Fall Prevention: safety round/check completed  Taken 8/20/2021 1645 by Umm Garcia RN  Safety Promotion/Fall Prevention:   assistive device/personal items within reach   clutter free environment maintained   fall prevention program maintained   nonskid shoes/slippers when out of bed   room organization consistent   safety round/check completed  Taken 8/20/2021 1600 by Umm Garcia RN  Safety Promotion/Fall Prevention: safety round/check completed  Taken 8/20/2021 1535 by Umm Garcia RN  Safety Promotion/Fall Prevention:   assistive device/personal items within reach   clutter free environment maintained   fall prevention program maintained   nonskid shoes/slippers when out of bed   room organization consistent   safety round/check completed  Taken 8/20/2021 1400 by Umm Garcia RN  Safety Promotion/Fall Prevention: (At dialysis) patient off unit  Taken 8/20/2021 1200 by Umm Garcia RN  Safety Promotion/Fall Prevention: (At dialysis) patient off unit  Taken 8/20/2021 1000 by Umm Garcia RN  Safety Promotion/Fall Prevention: (At dialysis) patient off unit  Taken 8/20/2021 0815 by  Jose, Umm L, RN  Safety Promotion/Fall Prevention:   assistive device/personal items within reach   clutter free environment maintained   fall prevention program maintained   nonskid shoes/slippers when out of bed   room organization consistent   safety round/check completed  Taken 8/20/2021 0800 by Umm Garcia RN  Safety Promotion/Fall Prevention: safety round/check completed  Intervention: Prevent Skin Injury  Recent Flowsheet Documentation  Taken 8/20/2021 1645 by Umm Garcia RN  Body Position: position maintained  Skin Protection:   adhesive use limited   tubing/devices free from skin contact  Taken 8/20/2021 1535 by Umm Garcia RN  Body Position: position maintained  Skin Protection:   adhesive use limited   tubing/devices free from skin contact  Taken 8/20/2021 0815 by Umm Garcia RN  Body Position: position maintained  Skin Protection:   adhesive use limited   tubing/devices free from skin contact  Intervention: Prevent Infection  Recent Flowsheet Documentation  Taken 8/20/2021 1645 by Umm Garcia RN  Infection Prevention: hand hygiene promoted  Taken 8/20/2021 1535 by Umm Garcia RN  Infection Prevention: hand hygiene promoted  Taken 8/20/2021 0815 by Umm Garcia RN  Infection Prevention: hand hygiene promoted  Goal: Optimal Comfort and Wellbeing  Outcome: Ongoing, Progressing  Goal: Readiness for Transition of Care  Outcome: Ongoing, Progressing     Problem: Skin Injury Risk Increased  Goal: Skin Health and Integrity  Outcome: Ongoing, Progressing  Intervention: Optimize Skin Protection  Recent Flowsheet Documentation  Taken 8/20/2021 1645 by Umm Garcia RN  Pressure Reduction Techniques: frequent weight shift encouraged  Head of Bed (HOB): HOB at 30-45 degrees  Pressure Reduction Devices: pressure-redistributing mattress utilized  Skin Protection:   adhesive use limited   tubing/devices free from skin contact  Taken 8/20/2021 1535 by Jose  Umm ALEJANDRO RN  Pressure Reduction Techniques: frequent weight shift encouraged  Head of Bed (HOB): HOB elevated  Pressure Reduction Devices: pressure-redistributing mattress utilized  Skin Protection:   adhesive use limited   tubing/devices free from skin contact  Taken 8/20/2021 0815 by Umm Garcia RN  Pressure Reduction Techniques: frequent weight shift encouraged  Head of Bed (HOB): HOB at 30-45 degrees  Pressure Reduction Devices: pressure-redistributing mattress utilized  Skin Protection:   adhesive use limited   tubing/devices free from skin contact     Problem: Breathing Pattern Ineffective  Goal: Effective Breathing Pattern  Outcome: Ongoing, Progressing  Intervention: Promote Improved Breathing Pattern  Recent Flowsheet Documentation  Taken 8/20/2021 1645 by Umm Garcia RN  Head of Bed (HOB): HOB at 30-45 degrees  Taken 8/20/2021 1535 by Umm Garcia RN  Head of Bed (HOB): HOB elevated  Taken 8/20/2021 0815 by Umm Garcia RN  Head of Bed (HOB): HOB at 30-45 degrees     Problem: Noninvasive Ventilation Acute  Goal: Effective Unassisted Ventilation and Oxygenation  Outcome: Ongoing, Progressing     Problem: Fluid Volume Excess  Goal: Fluid Balance  Outcome: Ongoing, Progressing  Intervention: Monitor and Manage Hypervolemia  Recent Flowsheet Documentation  Taken 8/20/2021 1645 by Umm Garcia RN  Skin Protection:   adhesive use limited   tubing/devices free from skin contact  Taken 8/20/2021 1535 by Umm Garcia RN  Skin Protection:   adhesive use limited   tubing/devices free from skin contact  Taken 8/20/2021 0815 by Umm Garcia RN  Skin Protection:   adhesive use limited   tubing/devices free from skin contact   Goal Outcome Evaluation:      Patient had dialysis today. The patient did not have any other procedures or tests. The patient was going to have a permacath inserted in IR but has to be off of eliquis for three days. The patient is on 2L of oxygen and  did not have any complaints. Will continue to monitor.

## 2021-08-20 NOTE — PROGRESS NOTES
Referring Provider: Maurilio Boucher,*    Reason for follow-up:  Atrial fibrillation  Cardiomyopathy  Shortness of breath  Bradycardia  Respiratory insufficiency      Patient Care Team:  Samantha Zabala APRN as PCP - General  Samantha Zabala APRN as PCP - Family Medicine  Jose G Rm MD as Consulting Physician (Cardiology)    Subjective .  Feeling much better after dialysis was started.  Looking better.    ROS  Since I have last seen, the patient has been without any chest discomfort ,shortness of breath, palpitations, dizziness or syncope.  Denies having any headache ,abdominal pain ,nausea, vomiting , diarrhea constipation, loss of weight or loss of appetite.  Denies having any excessive bruising ,hematuria or blood in the stool.    Review of all systems negative except as indicated.    Reviewed ROS.  History  Past Medical History:   Diagnosis Date   • A-fib (CMS/[facility]) 8/3/2021   • Appetite loss    • Arthritis    • Brain bleed (CMS/[facility])    • Carpal tunnel syndrome on left    • CHF (congestive heart failure) (CMS/[facility])    • Chronic left-sided low back pain without sciatica 12/27/2017   • CKD (chronic kidney disease) stage 3, GFR 30-59 ml/min (CMS/[facility])    • Closed fracture of seventh thoracic vertebra (CMS/[facility]) 8/23/2020   • Cognitive communication deficit 12/1/2020   • COVID-19 2/19/2021   • Cytokine release syndrome, grade 1 5/24/2021   • Depression    • Diabetic neuropathy (CMS/[facility])    • DM2 (diabetes mellitus, type 2) (CMS/[facility])    • Hyperlipidemia    • Hypogonadism in male    • Nonsustained ventricular tachycardia (CMS/[facility]) 7/23/2021   • Obesity    • Paroxysmal atrial fibrillation (CMS/[facility]) 12/1/2020   • Sleep apnea    • Stroke (CMS/[facility])    • Unsteady gait        Past Surgical History:   Procedure Laterality Date   • ANGIOPLASTY      X2   • APPENDECTOMY     • CARDIAC CATHETERIZATION  11/13/2015   • CARDIAC CATHETERIZATION N/A 5/24/2021    Procedure: Left Heart Cath and coronary angiogram;  Surgeon:  Jose G Rm MD;  Location:  [facility];  Service: Cardiovascular;  Laterality: N/A;   • CARDIAC CATHETERIZATION  5/24/2021    Procedure: Saphenous Vein Graft;  Surgeon: Jose G Rm MD;  Location:  [facility];  Service: Cardiovascular;;   • CARDIAC CATHETERIZATION N/A 5/24/2021    Procedure: Percutaneous Coronary Intervention;  Surgeon: Bernabe Zambrano MD;  Location:  [facility];  Service: Cardiology;  Laterality: N/A;   • CARDIAC CATHETERIZATION N/A 5/24/2021    Procedure: Stent NIELS coronary;  Surgeon: Bernabe Zambrano MD;  Location:  [facility];  Service: Cardiology;  Laterality: N/A;   • CARDIAC CATHETERIZATION N/A 5/26/2021    Procedure: Percutaneous Coronary Intervention;  Surgeon: Bernabe Zambrano MD;  Location:  [facility];  Service: Cardiovascular;  Laterality: N/A;   • CARDIAC CATHETERIZATION N/A 5/26/2021    Procedure: Stent NIELS bypass graft;  Surgeon: Bernabe Zambrano MD;  Location:  [facility];  Service: Cardiovascular;  Laterality: N/A;   • CARDIAC ELECTROPHYSIOLOGY PROCEDURE N/A 5/24/2021    Procedure: Temporary Pacemaker;  Surgeon: Bernabe Zambrano MD;  Location:  [facility];  Service: Cardiology;  Laterality: N/A;   • CARDIAC ELECTROPHYSIOLOGY PROCEDURE N/A 5/26/2021    Procedure: Temporary Pacemaker;  Surgeon: Bernabe Zambrano MD;  Location: [facility] CATH INVASIVE LOCATION;  Service: Cardiovascular;  Laterality: N/A;   • CARPAL TUNNEL RELEASE Left 04/29/2018    carpal tunnel- lt hand// other hand surgeries    • CATARACT EXTRACTION, BILATERAL  2002    Dr. Lux Acosta   • CHOLECYSTECTOMY     • COLON RESECTION  2005   • CORONARY ANGIOPLASTY     • CORONARY ANGIOPLASTY WITH STENT PLACEMENT  11/13/2015    PTCA stent to proximal in stent and mid to distal lad   • CORONARY ANGIOPLASTY WITH STENT PLACEMENT  09/16/2016    PTCA stent to mid lad and stent to vein graft to marginal   •  CORONARY ARTERY BYPASS GRAFT  2005    @ [facility]   • CYST REMOVAL      cyst removed from scrotum   • FOOT SURGERY Right 07/17/2018   • FOOT SURGERY Left 02/04/2019   • TOE AMPUTATION Right     right toe removed D/T infected cut that went to the bone       Family History   Problem Relation Age of Onset   • Heart disease Mother    • Heart disease Father    • Diabetes Sister    • Heart disease Sister    • Diabetes Brother    • Mental illness Brother        Social History     Tobacco Use   • Smoking status: Former Smoker     Packs/day: 1.00     Years: 60.00     Pack years: 60.00     Types: Cigarettes   • Smokeless tobacco: Never Used   • Tobacco comment: Patient quit smoking 11/2020   Vaping Use   • Vaping Use: Never used   Substance Use Topics   • Alcohol use: No   • Drug use: Yes     Frequency: 1.0 times per week     Types: Marijuana     Comment: socially        Medications Prior to Admission   Medication Sig Dispense Refill Last Dose   • albuterol sulfate  (90 Base) MCG/ACT inhaler Inhale 2 puffs Every 4 (Four) Hours As Needed.      • apixaban (ELIQUIS) 5 MG tablet tablet Take 5 mg by mouth Daily.      • aspirin 81 MG chewable tablet Chew 81 mg Daily.      • atorvastatin (LIPITOR) 40 MG tablet Take 40 mg by mouth Every Night.      • cholecalciferol (VITAMIN D3) 25 MCG (1000 UT) tablet Take 1,000 Units by mouth Daily.      • dextrose (GLUTOSE) 40 % gel Take  by mouth Every 1 (One) Hour As Needed for Low Blood Sugar.      • donepezil (ARICEPT) 10 MG tablet Take 10 mg by mouth Every Night.      • DULoxetine HCl (DRIZALMA) 60 MG capsule Take 60 mg by mouth Daily.      • gabapentin (NEURONTIN) 100 MG capsule Take 2 capsules by mouth 2 (two) times a day. 6 capsule 0    • hydrALAZINE (APRESOLINE) 50 MG tablet Take 50 mg by mouth 2 (two) times a day. Hold for SBP <110      • isosorbide mononitrate (IMDUR) 60 MG 24 hr tablet Take 60 mg by mouth 2 (Two) Times a Day.      • metoprolol succinate XL (TOPROL-XL) 100 MG 24 hr  tablet Take 1 tablet by mouth Daily for 30 days. 30 tablet 0    • nitroglycerin (NITROSTAT) 0.4 MG SL tablet Place 1 tablet under the tongue Every 5 (Five) Minutes As Needed for Chest Pain (Only if SBP Greater Than 100). Take no more than 3 doses in 15 minutes. 30 tablet 12    • spironolactone (ALDACTONE) 25 MG tablet Take 1 tablet by mouth Daily for 30 days. 30 tablet 0    • tiotropium (Spiriva HandiHaler) 18 MCG per inhalation capsule Place 1 capsule into inhaler and inhale Daily. 30 capsule 1    • vitamin B-12 (CYANOCOBALAMIN) 1000 MCG tablet Take 1,000 mcg by mouth Daily.      • [DISCONTINUED] furosemide (LASIX) 40 MG tablet Take 1 tablet by mouth 2 (Two) Times a Day for 30 days. 60 tablet 0        Allergies  Codeine    Scheduled Meds:amiodarone, 200 mg, Oral, Q12H  apixaban, 5 mg, Oral, Q12H  aspirin, 81 mg, Oral, Daily  atorvastatin, 40 mg, Oral, Nightly  budesonide, 0.5 mg, Nebulization, BID - RT  bumetanide, 2 mg, Intravenous, Q12H  carvedilol, 12.5 mg, Oral, Q12H  cholecalciferol, 1,000 Units, Oral, Daily  dilTIAZem CD, 240 mg, Oral, Q24H  donepezil, 10 mg, Oral, Nightly  DULoxetine, 60 mg, Oral, Daily  epoetin izabella-epbx, 20,000 Units, Subcutaneous, Weekly  insulin lispro, 0-24 Units, Subcutaneous, 4x Daily With Meals & Nightly  ipratropium-albuterol, 3 mL, Nebulization, Q4H - RT  isosorbide mononitrate, 60 mg, Oral, BID - Nitrates  metOLazone, 5 mg, Oral, Daily  sodium chloride, 10 mL, Intravenous, Q12H  vitamin B-12, 1,000 mcg, Oral, Daily      Continuous Infusions:   PRN Meds:.•  acetaminophen **OR** acetaminophen **OR** acetaminophen  •  aluminum-magnesium hydroxide-simethicone  •  dextrose  •  dextrose  •  glucagon (human recombinant)  •  heparin (porcine)  •  insulin lispro **AND** insulin lispro  •  melatonin  •  nitroglycerin  •  ondansetron **OR** ondansetron  •  [COMPLETED] Insert peripheral IV **AND** sodium chloride  •  sodium chloride    Objective     VITAL SIGNS  Vitals:    08/20/21 0031  "08/20/21 0248 08/20/21 0300 08/20/21 0612   BP:  91/58  97/59   BP Location:  Left arm  Left arm   Patient Position:  Lying  Lying   Pulse: 108 108 100 106   Resp:  16  16   Temp:  98.4 °F (36.9 °C)     TempSrc:  Oral  Oral   SpO2: 96% 94% 95% 95%   Weight:    106 kg (234 lb 2.1 oz)   Height:           Flowsheet Rows      First Filed Value   Admission Height  180.3 cm (71\") Documented at 08/03/2021 1733   Admission Weight  113 kg (250 lb) Documented at 08/03/2021 1733            Intake/Output Summary (Last 24 hours) at 8/20/2021 0640  Last data filed at 8/19/2021 1629  Gross per 24 hour   Intake 462 ml   Output 100 ml   Net 362 ml        TELEMETRY: Atrial fibrillation with controlled ventricular response.    Physical Exam:  The patient is awake and alert and not in distress.  Vital signs as noted above.  Exogenous obesity.  Head and neck revealed no carotid bruits or jugular venous distention.  No thyromegaly or lymphadenopathy is present  Lungs clear.  No wheezing.  Breath sounds are normal bilaterally.  Heart normal first and second heart sounds.  No murmur. No precordial rub is present.  No gallop is present.  Abdomen soft and nontender.  No organomegaly is present.  Extremities with good peripheral pulses without any pedal edema.  Skin warm and dry.  Musculoskeletal system is grossly normal  CNS-grossly normal.  Results Review:   I reviewed the patient's new clinical results.  Lab Results (last 24 hours)     Procedure Component Value Units Date/Time    Renal Function Panel [[id number]]  (Abnormal) Collected: 08/20/21 0227    Specimen: Blood Updated: 08/20/21 0411     Glucose 181 mg/dL      BUN 37 mg/dL      Creatinine 2.52 mg/dL      Sodium 133 mmol/L      Potassium 4.3 mmol/L      Chloride 93 mmol/L      CO2 29.0 mmol/L      Calcium 8.6 mg/dL      Albumin 3.70 g/dL      Phosphorus 4.0 mg/dL      Anion Gap 11.0 mmol/L      BUN/Creatinine Ratio 14.7     eGFR Non African Amer 25 mL/min/1.73     Narrative:      GFR " Normal >60  Chronic Kidney Disease <60  Kidney Failure <15      CBC & Differential [[id number]]  (Abnormal) Collected: 08/20/21 0227    Specimen: Blood Updated: 08/20/21 0346    Narrative:      The following orders were created for panel order CBC & Differential.  Procedure                               Abnormality         Status                     ---------                               -----------         ------                     CBC Auto Differential[[id number]]        Abnormal            Final result                 Please view results for these tests on the individual orders.    CBC Auto Differential [[id number]]  (Abnormal) Collected: 08/20/21 0227    Specimen: Blood Updated: 08/20/21 0346     WBC 10.70 10*3/mm3      RBC 3.24 10*6/mm3      Hemoglobin 9.2 g/dL      Hematocrit 28.0 %      MCV 86.4 fL      MCH 28.5 pg      MCHC 33.0 g/dL      RDW 16.6 %      RDW-SD 51.2 fl      MPV 7.7 fL      Platelets 455 10*3/mm3      Neutrophil % 59.3 %      Lymphocyte % 22.1 %      Monocyte % 12.3 %      Eosinophil % 5.0 %      Basophil % 1.3 %      Neutrophils, Absolute 6.40 10*3/mm3      Lymphocytes, Absolute 2.40 10*3/mm3      Monocytes, Absolute 1.30 10*3/mm3      Eosinophils, Absolute 0.50 10*3/mm3      Basophils, Absolute 0.10 10*3/mm3      nRBC 0.0 /100 WBC     POC Glucose Once [[id number]]  (Abnormal) Collected: 08/19/21 1929    Specimen: Blood Updated: 08/19/21 1930     Glucose 145 mg/dL      Comment: Serial Number: [id number]:  [id number]       POC Glucose Once [[id number]]  (Abnormal) Collected: 08/19/21 1631    Specimen: Blood Updated: 08/19/21 1632     Glucose 206 mg/dL      Comment: Serial Number: [id number]:  [id number]       POC Glucose Once [[id number]]  (Abnormal) Collected: 08/19/21 1124    Specimen: Blood Updated: 08/19/21 1125     Glucose 253 mg/dL      Comment: Serial Number: [id number]:  [id number]             Imaging Results (Last 24 Hours)     ** No results found for the last 24 hours.  **      LAB RESULTS (LAST 7 DAYS)    CBC  Results from last 7 days   Lab Units 08/20/21 0227 08/19/21  0613 08/18/21  0607 08/17/21  0538 08/16/21  1120 08/16/21  0436 08/14/21  0440 08/14/21  0432 08/14/21  0412   WBC 10*3/mm3 10.70 10.70 10.00 7.60 7.20 7.10  --   --  10.30   RBC 10*6/mm3 3.24* 3.16* 3.00* 2.73* 2.66* 2.26*  --   --  2.79*   HEMOGLOBIN g/dL 9.2* 9.4* 8.6* 7.7* 7.6*  7.6* 6.6*  --   --  8.1*   HEMOGLOBIN, POC g/dL  --   --   --   --   --   --  10.7*   < >  --    HEMATOCRIT % 28.0* 27.7* 26.0* 23.6* 22.9*  22.9* 19.5*  --   --  24.3*   HEMATOCRIT POC %  --   --   --   --   --   --  31*   < >  --    MCV fL 86.4 87.6 86.5 86.3 86.2 86.4  --   --  86.9   PLATELETS 10*3/mm3 455* 429 383 314 305 301  --   --  309    < > = values in this interval not displayed.       BMP  Results from last 7 days   Lab Units 08/20/21 0227 08/19/21 0613 08/18/21  0607 08/17/21  0538 08/16/21  0308 08/15/21  0447 08/14/21  0800 08/14/21  0412 08/14/21  0412   SODIUM mmol/L 133* 134* 137 134* 136 133*  --   --  134*   POTASSIUM mmol/L 4.3 3.7 4.3 3.2* 3.5 3.4* 4.3   < > 4.5   CHLORIDE mmol/L 93* 94* 97* 97* 93* 91*  --   --  88*   CO2 mmol/L 29.0 31.0* 28.0 28.0 32.0* 32.0*  --   --  33.0*   BUN mg/dL 37* 23 49* 35* 57* 45*  --   --  87*   CREATININE mg/dL 2.52* 1.50* 2.48* 1.77* 2.45* 1.70*  --   --  2.73*   GLUCOSE mg/dL 181* 198* 149* 156* 167* 149*  --   --  182*   MAGNESIUM mg/dL  --   --  1.9  --   --   --   --   --  2.2   PHOSPHORUS mg/dL 4.0 2.4* 5.3* 2.0* 3.5 2.7  --   --  5.4*    < > = values in this interval not displayed.       CMP   Results from last 7 days   Lab Units 08/20/21  0227 08/19/21  0613 08/18/21  0607 08/17/21  0538 08/16/21  0308 08/15/21  0447 08/14/21  0800 08/14/21  0412 08/14/21  0412   SODIUM mmol/L 133* 134* 137 134* 136 133*  --   --  134*   POTASSIUM mmol/L 4.3 3.7 4.3 3.2* 3.5 3.4* 4.3   < > 4.5   CHLORIDE mmol/L 93* 94* 97* 97* 93* 91*  --   --  88*   CO2 mmol/L 29.0 31.0* 28.0 28.0  32.0* 32.0*  --   --  33.0*   BUN mg/dL 37* 23 49* 35* 57* 45*  --   --  87*   CREATININE mg/dL 2.52* 1.50* 2.48* 1.77* 2.45* 1.70*  --   --  2.73*   GLUCOSE mg/dL 181* 198* 149* 156* 167* 149*  --   --  182*   ALBUMIN g/dL 3.70 3.90 3.40* 3.10* 3.20* 3.50  --   --  3.80    < > = values in this interval not displayed.         BNP        TROPONIN        CoAg  Results from last 7 days   Lab Units 08/14/21  0441   INR  1.17*   APTT seconds 33.4*       Creatinine Clearance  Estimated Creatinine Clearance: 31.7 mL/min (A) (by C-G formula based on SCr of 2.52 mg/dL (H)).    ABG  Results from last 7 days   Lab Units 08/14/21  1641 08/14/21  0626 08/14/21  0440 08/14/21  0432   PH, ARTERIAL pH units 7.444 7.294* 7.266* 7.270*   PCO2, ARTERIAL mm Hg 49.7* 72.5* 75.5* 78.3*   PO2 ART mm Hg 173.2* 193.8* 97.8 50.4*   O2 SATURATION ART % 99.6* 99.5* 96.0 77.7*   BASE EXCESS ART mmol/L 9.0* 7.2* 5.5* 7.6*       Radiology  No radiology results for the last day            EKG              I personally viewed and interpreted the patient's EKG/Telemetry data: Atrial fibrillation  ECHOCARDIOGRAM:    Results for orders placed during the hospital encounter of 07/20/21    Adult Transthoracic Echo Complete With Contrast if Necessary Per Protocol    Interpretation Summary  · Estimated left ventricular EF = 30% Left ventricular systolic function is moderately decreased.    Occasions  Shortness of breath.    Technically satisfactory study.  Mitral valve is structurally normal.  Mild mitral regurgitation.  Tricuspid valve is structurally normal.  Aortic valve is structurally normal.  Pulmonic valve could not be well visualized.  No evidence for tricuspid or aortic regurgitation is seen by Doppler study.  Biatrial and biventricular enlargement is present.  Diffuse left ventricular hypocontractility with ejection fraction of 30%.  Atrial septum is intact.  Aorta is normal.  No pericardial effusion or intracardiac thrombus is  seen.    Impression  Mild mitral regurgitation.  Significant biatrial and biventricular enlargement.  Diffuse left ventricular hypocontractility with ejection fraction of 30%.  No pericardial effusion or intracardiac thrombus is seen.          STRESS MYOVIEW:    Cardiolite (Tc-99m Sestamibi) stress test    CARDIAC CATHETERIZATION:            OTHER:         Assessment/Plan     Principal Problem:    Acute on chronic systolic heart failure (CMS/HCC)  Active Problems:    S/P CABG (coronary artery bypass graft)    Essential hypertension    Elevated troponin    ANNETTE treated with BiPAP    Major depressive disorder, recurrent, mild (CMS/HCC)    Mixed hyperlipidemia    History of cerebrovascular accident    Flaccid hemiplegia of right dominant side as late effect of cerebral infarction (CMS/HCC)    Diabetic neuropathy (CMS/HCC)    Unspecified dementia with behavioral disturbance (CMS/HCC)    Coronary artery disease    Obesity    Vitamin D deficiency    Chronic venous hypertension (idiopathic) with ulcer and inflammation of bilateral lower extremity (CMS/HCC)    Chronic obstructive pulmonary disease, unspecified (CMS/HCC)    Chronic foot ulcer (CMS/HCC)    Paroxysmal atrial fibrillation (CMS/HCC)    Acute kidney injury (CMS/HCC)    Type 2 diabetes mellitus with hyperglycemia (CMS/HCC)    CKD (chronic kidney disease) stage 3, GFR 30-59 ml/min (CMS/HCC)    Anemia of renal disease    Acute respiratory failure with hypoxia and hypercapnia (CMS/HCC)      [[[[[[[[[[[[[[[[[[[[[[[[[    Impression  ==============  -Increased shortness of breath and palpitations     -Recent acute on chronic congestive heart failure.  Compensated at this time  Recent echocardiogram showed left ventricle dysfunction with ejection fraction of 35%.     -History of right-sided weakness with left MCA territory stroke.-Improved      -status post CABG 2005. status post stent to LAD 2009    Status post stent to LAD 11/13/2015  Status post stent to mid LAD and  SVG to marginal branch 09/16/2016.  Status post stent to mid LAD 5/24/2021  Status post stent to SVG to RCA 5/26/2021     Cardiac catheterization 5/24/2021 revealed  Left ventricle angiogram was not performed due to pre-existing renal dysfunction.  Left main coronary artery normal.  Left anterior descending artery has mid segment lengthy 90% disease within the previously placed stent.  Distal left into descending artery has diffuse disease.  Circumflex coronary artery is totally occluded.  (Chronic)  Right coronary artery is chronically occluded.  SVG to marginal branch (jump graft to OM1 and OM 2) is totally occluded (new finding compared to 2015.  SVG to PDA has distal radiolucency.  However no definite obstructive disease is present.       -status post myocardial infarction 2000 and 2002 .      -history of intermittent atrial fibrillation-maintaining sinus rhythm     Transesophageal echocardiogram 11/30/2020.  Biatrial enlargement.  Smoking effect in the left atrium and left ventricle and left atrial appendage.  Mild mitral and aortic regurgitation.  Left ventricle enlargement with diffuse hypocontractility with ejection fraction of 35 to 40%.     Echocardiogram 11/27/2020 revealed left atrial enlargement left ventricle dysfunction with ejection fraction of 40%.  Negative bubble study.      -diabetes hypertension and sleep apnea in history of renal dysfunction .  Recent BUN/creatinine 38/1.36     -status post colon surgery (partial colectomy) appendectomy cholecystectomy right 4th toe removal and carpal tunnel surgery      -continued smoking 1 pack per day -abstinence from smoking      -allergy to codeine.  ============   Plan  ================  Atrial fibrillation-chronic  Rate is better controlled  Tinea increased dose of Coreg at 12.5 mg p.o. twice a day.  Continue amiodarone and Cardizem CD.  Observe patient's blood pressure.    Respiratory insufficiency.-Improved    Significant renal  dysfunction-chronic  Patient is on dialysis now.  Likely long-term  Patient is feeling much better after dialysis was started.    Anemia  Hemoglobin 6.9.  Hemoglobin 7.7-8/17/2021  8.6-8/18/2021    Status post CABG  Patient is not having any angina pectoris    Status post stent to LAD 5/24/2021.  Status post stent to SVG to RCA 5/26/2021.       Anticoagulation  Patient is on Eliquis.  Hold Eliquis in anticipation of placing permacatheter for dialysis.  It is my understanding it scheduled for Monday.  Patient can receive Lovenox on Sunday.  Observe for toxic effects.    Have discussed with attending nurse for coordination of care.    Further plan will depend on patient's progress.   ]]]]]]]]]]]]]]]]]]]]]]           Jose G Rm MD  08/20/21  06:40 EDT

## 2021-08-20 NOTE — PLAN OF CARE
Goal Outcome Evaluation:        Bed mobility - Mod-A supine to sit to supine  Transfers - Min-A and with rolling walker  Sit to stand from eob, from bs chair 2 times.    Ambulation - 10 feet x's 2 and 5 feet Min-A and with rolling walker   Pt sat up in the chair for about one hour to eat his meal.  Returned to room to transfer pt back to bed.   Therapeutic exercises:   Heel slides, ap, hip abduction, quad sets, glute sets, laq's  1/10 reps actively    Pt would benefit from Inpatient Rehabilitation placement at discharge from facility and requires no DME at discharge.   Pt desires Inpatient Rehabilitation placement at discharge. Pt cooperative; agreeable to therapeutic recommendations and plan of care.

## 2021-08-20 NOTE — PROGRESS NOTES
Daily Progress Note        Acute on chronic systolic heart failure (CMS/HCC)    S/P CABG (coronary artery bypass graft)    Essential hypertension    Elevated troponin    ANNETTE treated with BiPAP    Major depressive disorder, recurrent, mild (CMS/[facility])    Mixed hyperlipidemia    History of cerebrovascular accident    Flaccid hemiplegia of right dominant side as late effect of cerebral infarction (CMS/[facility])    Diabetic neuropathy (CMS/[facility])    Unspecified dementia with behavioral disturbance (CMS/[facility])    Coronary artery disease    Obesity    Vitamin D deficiency    Chronic venous hypertension (idiopathic) with ulcer and inflammation of bilateral lower extremity (CMS/[facility])    Chronic obstructive pulmonary disease, unspecified (CMS/[facility])    Chronic foot ulcer (CMS/[facility])    Paroxysmal atrial fibrillation (CMS/[facility])    Acute kidney injury (CMS/[facility])    Type 2 diabetes mellitus with hyperglycemia (CMS/[facility])    CKD (chronic kidney disease) stage 3, GFR 30-59 ml/min (CMS/[facility])    Anemia of renal disease    Acute respiratory failure with hypoxia and hypercapnia (CMS/[facility])      Assessment    Pneumonia  COPD  Bilateral pleural effusion  Atrial fibrillation with RVR   Acute on chronic systolic heart failure  CAD S/P CABG   HTN  Elevated troponin  ANNETTE uses BiPAP  Hyperlipidemia  Flaccid hemiplegia of right dominant side as late effect of cerebral infarction   DM II with neuropathy   Dementia with behavioral disturbance  Chronic venous hypertension (idiopathic) with ulcer/inflammation of BLE  Chronic foot ulcer   CKD   Tobacco use disorder   Anemia      Plan    Encourage OOB daily   Keep sat > 88  Bronchodilator  Inhaled corticosteroids  Electrolytes/ glycemic control  DVT and GI prophylaxis.  Nutrition: P.o. diet and supplementation with boost.  Small amounts of intake as tolerated by saturations/O2 needs         LOS: 17 days     Subjective         Objective     Vital signs for last 24 hours:  Vitals:    08/20/21 0036 08/20/21 0039 08/20/21  0248 08/20/21 0300   BP:   91/58    BP Location:   Left arm    Patient Position:   Lying    Pulse: 106 108 108 100   Resp: 16  16    Temp:   98.4 °F (36.9 °C)    TempSrc:   Oral    SpO2: 94% 96% 94% 95%   Weight:       Height:           Intake/Output last 3 shifts:  I/O last 3 completed shifts:  In: 1302 [P.O.:702; I.V.:600]  Out: 4000 [Urine:700; Other:3300]  Intake/Output this shift:  No intake/output data recorded.      Radiology  Imaging Results (Last 24 Hours)     ** No results found for the last 24 hours. **          Labs:  Results from last 7 days   Lab Units 08/20/21  0227   WBC 10*3/mm3 10.70   HEMOGLOBIN g/dL 9.2*   HEMATOCRIT % 28.0*   PLATELETS 10*3/mm3 455*     Results from last 7 days   Lab Units 08/20/21  0227   SODIUM mmol/L 133*   POTASSIUM mmol/L 4.3   CHLORIDE mmol/L 93*   CO2 mmol/L 29.0   BUN mg/dL 37*   CREATININE mg/dL 2.52*   CALCIUM mg/dL 8.6   GLUCOSE mg/dL 181*     Results from last 7 days   Lab Units 08/14/21  1641   PH, ARTERIAL pH units 7.444   PO2 ART mm Hg 173.2*   PCO2, ARTERIAL mm Hg 49.7*   HCO3 ART mmol/L 34.1*     Results from last 7 days   Lab Units 08/20/21  0227 08/19/21  0613 08/18/21  0607   ALBUMIN g/dL 3.70 3.90 3.40*             Results from last 7 days   Lab Units 08/18/21  0607   MAGNESIUM mg/dL 1.9     Results from last 7 days   Lab Units 08/14/21  0441   INR  1.17*   APTT seconds 33.4*               Meds:   SCHEDULE  amiodarone, 200 mg, Oral, Q12H  apixaban, 5 mg, Oral, Q12H  aspirin, 81 mg, Oral, Daily  atorvastatin, 40 mg, Oral, Nightly  budesonide, 0.5 mg, Nebulization, BID - RT  bumetanide, 2 mg, Intravenous, Q12H  carvedilol, 12.5 mg, Oral, Q12H  cholecalciferol, 1,000 Units, Oral, Daily  dilTIAZem CD, 240 mg, Oral, Q24H  donepezil, 10 mg, Oral, Nightly  DULoxetine, 60 mg, Oral, Daily  epoetin izabella-epbx, 20,000 Units, Subcutaneous, Weekly  insulin lispro, 0-24 Units, Subcutaneous, 4x Daily With Meals & Nightly  ipratropium-albuterol, 3 mL, Nebulization, Q4H -  RT  isosorbide mononitrate, 60 mg, Oral, BID - Nitrates  metOLazone, 5 mg, Oral, Daily  sodium chloride, 10 mL, Intravenous, Q12H  vitamin B-12, 1,000 mcg, Oral, Daily      Infusions     PRNs  •  acetaminophen **OR** acetaminophen **OR** acetaminophen  •  aluminum-magnesium hydroxide-simethicone  •  dextrose  •  dextrose  •  glucagon (human recombinant)  •  heparin (porcine)  •  insulin lispro **AND** insulin lispro  •  melatonin  •  nitroglycerin  •  ondansetron **OR** ondansetron  •  [COMPLETED] Insert peripheral IV **AND** sodium chloride  •  sodium chloride    Physical Exam:  Physical Exam  Cardiovascular:      Heart sounds: Murmur heard.     Pulmonary:      Breath sounds: Rhonchi and rales present.         ROS  Review of Systems    I have reviewed the patient's new clinical results.    Electronically signed by FABIOLA Bui

## 2021-08-21 LAB
ALBUMIN SERPL-MCNC: 3.7 G/DL (ref 3.5–5.2)
ANION GAP SERPL CALCULATED.3IONS-SCNC: 10 MMOL/L (ref 5–15)
BASOPHILS # BLD AUTO: 0.1 10*3/MM3 (ref 0–0.2)
BASOPHILS NFR BLD AUTO: 1.5 % (ref 0–1.5)
BUN SERPL-MCNC: 29 MG/DL (ref 8–23)
BUN/CREAT SERPL: 11.3 (ref 7–25)
CALCIUM SPEC-SCNC: 8.6 MG/DL (ref 8.6–10.5)
CHLORIDE SERPL-SCNC: 94 MMOL/L (ref 98–107)
CO2 SERPL-SCNC: 29 MMOL/L (ref 22–29)
CREAT SERPL-MCNC: 2.57 MG/DL (ref 0.76–1.27)
DEPRECATED RDW RBC AUTO: 50.8 FL (ref 37–54)
EOSINOPHIL # BLD AUTO: 0.6 10*3/MM3 (ref 0–0.4)
EOSINOPHIL NFR BLD AUTO: 7 % (ref 0.3–6.2)
ERYTHROCYTE [DISTWIDTH] IN BLOOD BY AUTOMATED COUNT: 16.6 % (ref 12.3–15.4)
GFR SERPL CREATININE-BSD FRML MDRD: 25 ML/MIN/1.73
GLUCOSE BLDC GLUCOMTR-MCNC: 168 MG/DL (ref 70–105)
GLUCOSE BLDC GLUCOMTR-MCNC: 185 MG/DL (ref 70–105)
GLUCOSE BLDC GLUCOMTR-MCNC: 227 MG/DL (ref 70–105)
GLUCOSE BLDC GLUCOMTR-MCNC: 242 MG/DL (ref 70–105)
GLUCOSE SERPL-MCNC: 185 MG/DL (ref 65–99)
HCT VFR BLD AUTO: 27.3 % (ref 37.5–51)
HGB BLD-MCNC: 9 G/DL (ref 13–17.7)
LYMPHOCYTES # BLD AUTO: 1.8 10*3/MM3 (ref 0.7–3.1)
LYMPHOCYTES NFR BLD AUTO: 20.2 % (ref 19.6–45.3)
MCH RBC QN AUTO: 28.4 PG (ref 26.6–33)
MCHC RBC AUTO-ENTMCNC: 32.9 G/DL (ref 31.5–35.7)
MCV RBC AUTO: 86.4 FL (ref 79–97)
MONOCYTES # BLD AUTO: 1.3 10*3/MM3 (ref 0.1–0.9)
MONOCYTES NFR BLD AUTO: 14.1 % (ref 5–12)
NEUTROPHILS NFR BLD AUTO: 5.1 10*3/MM3 (ref 1.7–7)
NEUTROPHILS NFR BLD AUTO: 57.2 % (ref 42.7–76)
NRBC BLD AUTO-RTO: 0.1 /100 WBC (ref 0–0.2)
PHOSPHATE SERPL-MCNC: 4.1 MG/DL (ref 2.5–4.5)
PLATELET # BLD AUTO: 426 10*3/MM3 (ref 140–450)
PMV BLD AUTO: 7.5 FL (ref 6–12)
POTASSIUM SERPL-SCNC: 4.8 MMOL/L (ref 3.5–5.2)
RBC # BLD AUTO: 3.16 10*6/MM3 (ref 4.14–5.8)
SODIUM SERPL-SCNC: 133 MMOL/L (ref 136–145)
WBC # BLD AUTO: 9 10*3/MM3 (ref 3.4–10.8)

## 2021-08-21 PROCEDURE — 82962 GLUCOSE BLOOD TEST: CPT

## 2021-08-21 PROCEDURE — 85025 COMPLETE CBC W/AUTO DIFF WBC: CPT | Performed by: INTERNAL MEDICINE

## 2021-08-21 PROCEDURE — 99232 SBSQ HOSP IP/OBS MODERATE 35: CPT | Performed by: HOSPITALIST

## 2021-08-21 PROCEDURE — 94799 UNLISTED PULMONARY SVC/PX: CPT

## 2021-08-21 PROCEDURE — 94660 CPAP INITIATION&MGMT: CPT

## 2021-08-21 PROCEDURE — 63710000001 INSULIN LISPRO (HUMAN) PER 5 UNITS: Performed by: HOSPITALIST

## 2021-08-21 PROCEDURE — 80069 RENAL FUNCTION PANEL: CPT | Performed by: INTERNAL MEDICINE

## 2021-08-21 PROCEDURE — 94760 N-INVAS EAR/PLS OXIMETRY 1: CPT

## 2021-08-21 PROCEDURE — 97116 GAIT TRAINING THERAPY: CPT

## 2021-08-21 RX ORDER — POLYETHYLENE GLYCOL 3350 17 G/17G
17 POWDER, FOR SOLUTION ORAL DAILY
Status: DISCONTINUED | OUTPATIENT
Start: 2021-08-21 | End: 2021-08-25 | Stop reason: HOSPADM

## 2021-08-21 RX ORDER — AMOXICILLIN 250 MG
2 CAPSULE ORAL 2 TIMES DAILY
Status: DISCONTINUED | OUTPATIENT
Start: 2021-08-21 | End: 2021-08-25 | Stop reason: HOSPADM

## 2021-08-21 RX ADMIN — BUDESONIDE 0.5 MG: 0.5 SUSPENSION RESPIRATORY (INHALATION) at 20:06

## 2021-08-21 RX ADMIN — ISOSORBIDE MONONITRATE 60 MG: 60 TABLET, EXTENDED RELEASE ORAL at 08:50

## 2021-08-21 RX ADMIN — CARVEDILOL 12.5 MG: 6.25 TABLET, FILM COATED ORAL at 08:50

## 2021-08-21 RX ADMIN — AMIODARONE HYDROCHLORIDE 200 MG: 200 TABLET ORAL at 08:49

## 2021-08-21 RX ADMIN — CYANOCOBALAMIN TAB 1000 MCG 1000 MCG: 1000 TAB at 08:49

## 2021-08-21 RX ADMIN — Medication 10 ML: at 08:49

## 2021-08-21 RX ADMIN — INSULIN LISPRO 8 UNITS: 100 INJECTION, SOLUTION INTRAVENOUS; SUBCUTANEOUS at 18:09

## 2021-08-21 RX ADMIN — AMIODARONE HYDROCHLORIDE 200 MG: 200 TABLET ORAL at 22:12

## 2021-08-21 RX ADMIN — IPRATROPIUM BROMIDE AND ALBUTEROL SULFATE 3 ML: 2.5; .5 SOLUTION RESPIRATORY (INHALATION) at 00:57

## 2021-08-21 RX ADMIN — DILTIAZEM HYDROCHLORIDE 240 MG: 240 CAPSULE, COATED, EXTENDED RELEASE ORAL at 08:49

## 2021-08-21 RX ADMIN — IPRATROPIUM BROMIDE AND ALBUTEROL SULFATE 3 ML: 2.5; .5 SOLUTION RESPIRATORY (INHALATION) at 23:33

## 2021-08-21 RX ADMIN — POLYETHYLENE GLYCOL 3350 17 G: 17 POWDER, FOR SOLUTION ORAL at 13:45

## 2021-08-21 RX ADMIN — ISOSORBIDE MONONITRATE 60 MG: 60 TABLET, EXTENDED RELEASE ORAL at 18:12

## 2021-08-21 RX ADMIN — INSULIN LISPRO 4 UNITS: 100 INJECTION, SOLUTION INTRAVENOUS; SUBCUTANEOUS at 22:19

## 2021-08-21 RX ADMIN — Medication 10 ML: at 22:12

## 2021-08-21 RX ADMIN — METOLAZONE 5 MG: 5 TABLET ORAL at 08:50

## 2021-08-21 RX ADMIN — DULOXETINE 60 MG: 30 CAPSULE, DELAYED RELEASE ORAL at 08:50

## 2021-08-21 RX ADMIN — CARVEDILOL 12.5 MG: 6.25 TABLET, FILM COATED ORAL at 22:11

## 2021-08-21 RX ADMIN — Medication 5 MG: at 22:11

## 2021-08-21 RX ADMIN — IPRATROPIUM BROMIDE AND ALBUTEROL SULFATE 3 ML: 2.5; .5 SOLUTION RESPIRATORY (INHALATION) at 11:10

## 2021-08-21 RX ADMIN — DONEPEZIL HYDROCHLORIDE 10 MG: 5 TABLET, FILM COATED ORAL at 22:12

## 2021-08-21 RX ADMIN — DOCUSATE SODIUM 50 MG AND SENNOSIDES 8.6 MG 2 TABLET: 8.6; 5 TABLET, FILM COATED ORAL at 13:45

## 2021-08-21 RX ADMIN — ASPIRIN 81 MG: 81 TABLET, CHEWABLE ORAL at 08:50

## 2021-08-21 RX ADMIN — ATORVASTATIN CALCIUM 40 MG: 40 TABLET, FILM COATED ORAL at 22:12

## 2021-08-21 RX ADMIN — INSULIN LISPRO 4 UNITS: 100 INJECTION, SOLUTION INTRAVENOUS; SUBCUTANEOUS at 08:50

## 2021-08-21 RX ADMIN — DOCUSATE SODIUM 50 MG AND SENNOSIDES 8.6 MG 2 TABLET: 8.6; 5 TABLET, FILM COATED ORAL at 22:11

## 2021-08-21 RX ADMIN — INSULIN LISPRO 8 UNITS: 100 INJECTION, SOLUTION INTRAVENOUS; SUBCUTANEOUS at 13:45

## 2021-08-21 RX ADMIN — Medication 1000 UNITS: at 08:49

## 2021-08-21 RX ADMIN — IPRATROPIUM BROMIDE AND ALBUTEROL SULFATE 3 ML: 2.5; .5 SOLUTION RESPIRATORY (INHALATION) at 16:04

## 2021-08-21 RX ADMIN — IPRATROPIUM BROMIDE AND ALBUTEROL SULFATE 3 ML: 2.5; .5 SOLUTION RESPIRATORY (INHALATION) at 20:00

## 2021-08-21 NOTE — PROGRESS NOTES
"RENAL/KCC:     LOS: 18 days    Patient Care Team:  Samantha Zabala APRN as PCP - General  Samantha Zabala APRN as PCP - Family Medicine  Jose G Rm MD as Consulting Physician (Cardiology)    Chief Complaint:  Palpitations    Subjective     Interval History:   Had HD yesterday. permacath on Monday due to anticoagulation     Objective     Vital Sign Min/Max for last 24 hours  Temp  Min: 97 °F (36.1 °C)  Max: 100.1 °F (37.8 °C)   BP  Min: 88/47  Max: 112/62   Pulse  Min: 69  Max: 118   Resp  Min: 14  Max: 23   SpO2  Min: 91 %  Max: 100 %   Flow (L/min)  Min: 2  Max: 2.5   Weight  Min: 105 kg (231 lb 14.4 oz)  Max: 105 kg (231 lb 14.4 oz)     Flowsheet Rows      First Filed Value   Admission Height  180.3 cm (71\") Documented at 08/03/2021 1733   Admission Weight  113 kg (250 lb) Documented at 08/03/2021 1733          No intake/output data recorded.  I/O last 3 completed shifts:  In: 694 [P.O.:694]  Out: 1225 [Urine:225; Other:1000]    Physical Exam:  GEN: Awake, NAD  ENT: PERRL, EOMI, MMM  NECK: Supple, no JVD  CHEST: CTAB, no W/R/C  CV: RRR, no M/G/R  ABD: Soft, NT, +BS  SKIN: Warm and Dry  NEURO: CN's intact      WBC WBC   Date Value Ref Range Status   08/21/2021 9.00 3.40 - 10.80 10*3/mm3 Final   08/20/2021 10.70 3.40 - 10.80 10*3/mm3 Final   08/19/2021 10.70 3.40 - 10.80 10*3/mm3 Final      HGB Hemoglobin   Date Value Ref Range Status   08/21/2021 9.0 (L) 13.0 - 17.7 g/dL Final   08/20/2021 9.2 (L) 13.0 - 17.7 g/dL Final   08/19/2021 9.4 (L) 13.0 - 17.7 g/dL Final      HCT Hematocrit   Date Value Ref Range Status   08/21/2021 27.3 (L) 37.5 - 51.0 % Final   08/20/2021 28.0 (L) 37.5 - 51.0 % Final   08/19/2021 27.7 (L) 37.5 - 51.0 % Final      Platlets No results found for: LABPLAT   MCV MCV   Date Value Ref Range Status   08/21/2021 86.4 79.0 - 97.0 fL Final   08/20/2021 86.4 79.0 - 97.0 fL Final   08/19/2021 87.6 79.0 - 97.0 fL Final          Sodium Sodium   Date Value Ref Range Status   08/21/2021 133 (L) 136 " - 145 mmol/L Final   08/20/2021 133 (L) 136 - 145 mmol/L Final   08/19/2021 134 (L) 136 - 145 mmol/L Final      Potassium Potassium   Date Value Ref Range Status   08/21/2021 4.8 3.5 - 5.2 mmol/L Final   08/20/2021 4.3 3.5 - 5.2 mmol/L Final   08/19/2021 3.7 3.5 - 5.2 mmol/L Final      Chloride Chloride   Date Value Ref Range Status   08/21/2021 94 (L) 98 - 107 mmol/L Final   08/20/2021 93 (L) 98 - 107 mmol/L Final   08/19/2021 94 (L) 98 - 107 mmol/L Final      CO2 CO2   Date Value Ref Range Status   08/21/2021 29.0 22.0 - 29.0 mmol/L Final   08/20/2021 29.0 22.0 - 29.0 mmol/L Final   08/19/2021 31.0 (H) 22.0 - 29.0 mmol/L Final      BUN BUN   Date Value Ref Range Status   08/21/2021 29 (H) 8 - 23 mg/dL Final   08/20/2021 37 (H) 8 - 23 mg/dL Final   08/19/2021 23 8 - 23 mg/dL Final      Creatinine Creatinine   Date Value Ref Range Status   08/21/2021 2.57 (H) 0.76 - 1.27 mg/dL Final   08/20/2021 2.52 (H) 0.76 - 1.27 mg/dL Final   08/19/2021 1.50 (H) 0.76 - 1.27 mg/dL Final      Calcium Calcium   Date Value Ref Range Status   08/21/2021 8.6 8.6 - 10.5 mg/dL Final   08/20/2021 8.6 8.6 - 10.5 mg/dL Final   08/19/2021 8.6 8.6 - 10.5 mg/dL Final      PO4 No results found for: CAPO4   Albumin Albumin   Date Value Ref Range Status   08/21/2021 3.70 3.50 - 5.20 g/dL Final   08/20/2021 3.70 3.50 - 5.20 g/dL Final   08/19/2021 3.90 3.50 - 5.20 g/dL Final      Magnesium No results found for: MG   Uric Acid No results found for: URICACID        Results Review:     I reviewed the patient's new clinical results.    amiodarone, 200 mg, Oral, Q12H  aspirin, 81 mg, Oral, Daily  atorvastatin, 40 mg, Oral, Nightly  budesonide, 0.5 mg, Nebulization, BID - RT  bumetanide, 2 mg, Intravenous, Q12H  carvedilol, 12.5 mg, Oral, Q12H  cholecalciferol, 1,000 Units, Oral, Daily  dilTIAZem CD, 240 mg, Oral, Q24H  donepezil, 10 mg, Oral, Nightly  DULoxetine, 60 mg, Oral, Daily  epoetin izabella-epbx, 20,000 Units, Subcutaneous, Weekly  insulin  lispro, 0-24 Units, Subcutaneous, 4x Daily With Meals & Nightly  ipratropium-albuterol, 3 mL, Nebulization, Q4H - RT  isosorbide mononitrate, 60 mg, Oral, BID - Nitrates  metOLazone, 5 mg, Oral, Daily  sodium chloride, 10 mL, Intravenous, Q12H  vitamin B-12, 1,000 mcg, Oral, Daily      hold, 1 each        Medication Review: Reviewed    Assessment/Plan       Acute on chronic systolic heart failure (CMS/HCC)    S/P CABG (coronary artery bypass graft)    Essential hypertension    Elevated troponin    ANNETTE treated with BiPAP    Major depressive disorder, recurrent, mild (CMS/[facility])    Mixed hyperlipidemia    History of cerebrovascular accident    Flaccid hemiplegia of right dominant side as late effect of cerebral infarction (CMS/[facility])    Diabetic neuropathy (CMS/[facility])    Unspecified dementia with behavioral disturbance (CMS/[facility])    Coronary artery disease    Obesity    Vitamin D deficiency    Chronic venous hypertension (idiopathic) with ulcer and inflammation of bilateral lower extremity (CMS/[facility])    Chronic obstructive pulmonary disease, unspecified (CMS/[facility])    Chronic foot ulcer (CMS/[facility])    Paroxysmal atrial fibrillation (CMS/[facility])    Acute kidney injury (CMS/[facility])    Type 2 diabetes mellitus with hyperglycemia (CMS/[facility])    CKD (chronic kidney disease) stage 3, GFR 30-59 ml/min (CMS/[facility])    Anemia of renal disease    Acute respiratory failure with hypoxia and hypercapnia (CMS/[facility])      1. DILEEP  2. CKD III  3. Hyperkalemia  4. Pulmonary edema  5. Tachycardia  6. CHF  7. T2DM  8. Hypertension  9. Anemia     PLAN:  HD MWF.  Still oliguric. Cr the same today, poor clearance yesterday?. No need for HD today, will plan for Monday as per schedule   Permcath on Monday.  UF as tolerated   Will stop diuretics for now, restart once UOP picks up.   Will follow.     Nancy Whitley M.D.  [facility]

## 2021-08-21 NOTE — PROGRESS NOTES
Daily Progress Note        Acute on chronic systolic heart failure (CMS/[facility])    S/P CABG (coronary artery bypass graft)    Essential hypertension    Elevated troponin    ANNETTE treated with BiPAP    Major depressive disorder, recurrent, mild (CMS/[facility])    Mixed hyperlipidemia    History of cerebrovascular accident    Flaccid hemiplegia of right dominant side as late effect of cerebral infarction (CMS/[facility])    Diabetic neuropathy (CMS/[facility])    Unspecified dementia with behavioral disturbance (CMS/[facility])    Coronary artery disease    Obesity    Vitamin D deficiency    Chronic venous hypertension (idiopathic) with ulcer and inflammation of bilateral lower extremity (CMS/[facility])    Chronic obstructive pulmonary disease, unspecified (CMS/[facility])    Chronic foot ulcer (CMS/[facility])    Paroxysmal atrial fibrillation (CMS/[facility])    Acute kidney injury (CMS/[facility])    Type 2 diabetes mellitus with hyperglycemia (CMS/[facility])    CKD (chronic kidney disease) stage 3, GFR 30-59 ml/min (CMS/[facility])    Anemia of renal disease    Acute respiratory failure with hypoxia and hypercapnia (CMS/[facility])      Assessment    Pneumonia  COPD  Bilateral pleural effusion  ANNETTE uses BiPAP    Atrial fibrillation with RVR   Acute on chronic systolic heart failure  CAD S/P CABG   HTN  Elevated troponin  Hyperlipidemia  Flaccid hemiplegia of right dominant side as late effect of cerebral infarction   DM II with neuropathy   Dementia with behavioral disturbance  Chronic venous hypertension (idiopathic) with ulcer/inflammation of BLE  Chronic foot ulcer   CKD   Tobacco use disorder   Anemia      ECHO 7/21/21  EF 30%  -Cardiology following    Plan    Encourage OOB daily   Titrate oxygen to keep sat > 88%  Diuretic-Bumex 2mg BID per Nephrology  Bronchodilator/Inhaled corticosteroids  Glycemic control  DVT prophylaxis  -Lovenox on hold permanent HD cath placement Monday  Nutrition: P.o. diet and supplementation with boost.  Small amounts of intake as tolerated by saturations/O2  needs         LOS: 18 days     Subjective         Objective     Vital signs for last 24 hours:  Vitals:    08/21/21 0103 08/21/21 0200 08/21/21 0542 08/21/21 0600   BP:  99/50 100/58    BP Location:       Patient Position:       Pulse: 91 88 118    Resp: 22 21 14    Temp:  99.8 °F (37.7 °C) 99.3 °F (37.4 °C)    TempSrc:  Oral Oral    SpO2: 100% 97%     Weight:    105 kg (231 lb 14.4 oz)   Height:           Intake/Output last 3 shifts:  I/O last 3 completed shifts:  In: 916 [P.O.:916]  Out: 1200 [Urine:200; Other:1000]  Intake/Output this shift:  I/O this shift:  In: 240 [P.O.:240]  Out: 125 [Urine:125]      Radiology  Imaging Results (Last 24 Hours)     ** No results found for the last 24 hours. **          Labs:  Results from last 7 days   Lab Units 08/21/21  0613   WBC 10*3/mm3 9.00   HEMOGLOBIN g/dL 9.0*   HEMATOCRIT % 27.3*   PLATELETS 10*3/mm3 426     Results from last 7 days   Lab Units 08/20/21  0227   SODIUM mmol/L 133*   POTASSIUM mmol/L 4.3   CHLORIDE mmol/L 93*   CO2 mmol/L 29.0   BUN mg/dL 37*   CREATININE mg/dL 2.52*   CALCIUM mg/dL 8.6   GLUCOSE mg/dL 181*     Results from last 7 days   Lab Units 08/14/21  1641   PH, ARTERIAL pH units 7.444   PO2 ART mm Hg 173.2*   PCO2, ARTERIAL mm Hg 49.7*   HCO3 ART mmol/L 34.1*     Results from last 7 days   Lab Units 08/20/21  0227 08/19/21  0613 08/18/21  0607   ALBUMIN g/dL 3.70 3.90 3.40*             Results from last 7 days   Lab Units 08/18/21  0607   MAGNESIUM mg/dL 1.9                   Meds:   SCHEDULE  amiodarone, 200 mg, Oral, Q12H  aspirin, 81 mg, Oral, Daily  atorvastatin, 40 mg, Oral, Nightly  budesonide, 0.5 mg, Nebulization, BID - RT  bumetanide, 2 mg, Intravenous, Q12H  carvedilol, 12.5 mg, Oral, Q12H  cholecalciferol, 1,000 Units, Oral, Daily  dilTIAZem CD, 240 mg, Oral, Q24H  donepezil, 10 mg, Oral, Nightly  DULoxetine, 60 mg, Oral, Daily  epoetin izabella-epbx, 20,000 Units, Subcutaneous, Weekly  insulin lispro, 0-24 Units, Subcutaneous, 4x Daily  With Meals & Nightly  ipratropium-albuterol, 3 mL, Nebulization, Q4H - RT  isosorbide mononitrate, 60 mg, Oral, BID - Nitrates  metOLazone, 5 mg, Oral, Daily  sodium chloride, 10 mL, Intravenous, Q12H  vitamin B-12, 1,000 mcg, Oral, Daily      Infusions  hold, 1 each      PRNs  •  acetaminophen **OR** acetaminophen **OR** acetaminophen  •  aluminum-magnesium hydroxide-simethicone  •  dextrose  •  dextrose  •  glucagon (human recombinant)  •  heparin (porcine)  •  hold  •  insulin lispro **AND** insulin lispro  •  melatonin  •  nitroglycerin  •  ondansetron **OR** ondansetron  •  [COMPLETED] Insert peripheral IV **AND** sodium chloride  •  sodium chloride    Physical Exam:  Physical Exam  Cardiovascular:      Heart sounds: Murmur heard.     Pulmonary:      Breath sounds: Rhonchi and rales present.         ROS  Review of Systems    I have reviewed the patient's new clinical results.    Electronically signed by FABIOLA Bui

## 2021-08-21 NOTE — PLAN OF CARE
Assessment: Benson Mclean presents with functional mobility impairments which indicate the need for skilled intervention. Tolerating session today without incident.Patient on 2L O2, sats 93>98%. HR 73>77. Easily fatigued and challenged.  Will continue to follow and progress as tolerated. Will need rehab at [place]

## 2021-08-21 NOTE — PLAN OF CARE
Problem: Arrhythmia/Dysrhythmia  Goal: Normalized Cardiac Rhythm  Outcome: Ongoing, Progressing     Problem: Fall Injury Risk  Goal: Absence of Fall and Fall-Related Injury  Outcome: Ongoing, Progressing  Intervention: Identify and Manage Contributors to Fall Injury Risk  Recent Flowsheet Documentation  Taken 8/21/2021 1641 by Umm Garcia RN  Self-Care Promotion: independence encouraged  Taken 8/21/2021 1221 by Umm Garcia RN  Medication Review/Management: medications reviewed  Self-Care Promotion: independence encouraged  Taken 8/21/2021 0845 by Umm Garcia RN  Medication Review/Management: medications reviewed  Self-Care Promotion: independence encouraged  Intervention: Promote Injury-Free Environment  Recent Flowsheet Documentation  Taken 8/21/2021 1641 by Umm Garcia RN  Safety Promotion/Fall Prevention:   assistive device/personal items within reach   clutter free environment maintained   fall prevention program maintained   nonskid shoes/slippers when out of bed   room organization consistent   safety round/check completed  Taken 8/21/2021 1600 by Umm Garcia RN  Safety Promotion/Fall Prevention: safety round/check completed  Taken 8/21/2021 1400 by Umm Garcia RN  Safety Promotion/Fall Prevention: safety round/check completed  Taken 8/21/2021 1221 by Umm Garcia RN  Safety Promotion/Fall Prevention:   assistive device/personal items within reach   clutter free environment maintained   fall prevention program maintained   room organization consistent   safety round/check completed   nonskid shoes/slippers when out of bed  Taken 8/21/2021 1200 by Umm Garcia RN  Safety Promotion/Fall Prevention: safety round/check completed  Taken 8/21/2021 1000 by Umm Garcia RN  Safety Promotion/Fall Prevention: safety round/check completed  Taken 8/21/2021 0845 by Umm Garcia RN  Safety Promotion/Fall Prevention:   assistive device/personal items within  reach   clutter free environment maintained   fall prevention program maintained   nonskid shoes/slippers when out of bed   room organization consistent   safety round/check completed  Taken 8/21/2021 0800 by Umm Garcia RN  Safety Promotion/Fall Prevention: safety round/check completed     Problem: Adult Inpatient Plan of Care  Goal: Plan of Care Review  Outcome: Ongoing, Progressing  Goal: Patient-Specific Goal (Individualized)  Outcome: Ongoing, Progressing  Goal: Absence of Hospital-Acquired Illness or Injury  Outcome: Ongoing, Progressing  Intervention: Identify and Manage Fall Risk  Recent Flowsheet Documentation  Taken 8/21/2021 1641 by Umm Garcia RN  Safety Promotion/Fall Prevention:   assistive device/personal items within reach   clutter free environment maintained   fall prevention program maintained   nonskid shoes/slippers when out of bed   room organization consistent   safety round/check completed  Taken 8/21/2021 1600 by Umm Garcia RN  Safety Promotion/Fall Prevention: safety round/check completed  Taken 8/21/2021 1400 by Umm Garcia RN  Safety Promotion/Fall Prevention: safety round/check completed  Taken 8/21/2021 1221 by Umm Garcia RN  Safety Promotion/Fall Prevention:   assistive device/personal items within reach   clutter free environment maintained   fall prevention program maintained   room organization consistent   safety round/check completed   nonskid shoes/slippers when out of bed  Taken 8/21/2021 1200 by Umm Garcia RN  Safety Promotion/Fall Prevention: safety round/check completed  Taken 8/21/2021 1000 by Umm Garcia RN  Safety Promotion/Fall Prevention: safety round/check completed  Taken 8/21/2021 0845 by Umm Garcia RN  Safety Promotion/Fall Prevention:   assistive device/personal items within reach   clutter free environment maintained   fall prevention program maintained   nonskid shoes/slippers when out of bed   room  organization consistent   safety round/check completed  Taken 8/21/2021 0800 by Umm Garcia RN  Safety Promotion/Fall Prevention: safety round/check completed  Intervention: Prevent Skin Injury  Recent Flowsheet Documentation  Taken 8/21/2021 1641 by Umm Garcia RN  Body Position: position maintained  Skin Protection:   adhesive use limited   tubing/devices free from skin contact  Taken 8/21/2021 1221 by Umm Garcia RN  Body Position: position maintained  Skin Protection:   adhesive use limited   tubing/devices free from skin contact  Taken 8/21/2021 0845 by Umm Garcia RN  Body Position: position maintained  Skin Protection:   adhesive use limited   tubing/devices free from skin contact  Intervention: Prevent Infection  Recent Flowsheet Documentation  Taken 8/21/2021 1641 by Umm Garcia RN  Infection Prevention: hand hygiene promoted  Taken 8/21/2021 1221 by Umm Garcia RN  Infection Prevention: hand hygiene promoted  Taken 8/21/2021 0845 by Umm Garcia RN  Infection Prevention: hand hygiene promoted  Goal: Optimal Comfort and Wellbeing  Outcome: Ongoing, Progressing  Goal: Readiness for Transition of Care  Outcome: Ongoing, Progressing     Problem: Skin Injury Risk Increased  Goal: Skin Health and Integrity  Outcome: Ongoing, Progressing  Intervention: Optimize Skin Protection  Recent Flowsheet Documentation  Taken 8/21/2021 1641 by Umm Garcia RN  Pressure Reduction Techniques:   frequent weight shift encouraged   weight shift assistance provided  Head of Bed (HOB): HOB at 30-45 degrees  Pressure Reduction Devices: pressure-redistributing mattress utilized  Skin Protection:   adhesive use limited   tubing/devices free from skin contact  Taken 8/21/2021 1221 by Umm Garcia RN  Pressure Reduction Techniques:   frequent weight shift encouraged   weight shift assistance provided  Head of Bed (HOB): (Sitting in the chair) other (see comments)  Pressure  Reduction Devices: pressure-redistributing mattress utilized  Skin Protection:   adhesive use limited   tubing/devices free from skin contact  Taken 8/21/2021 0845 by Umm Garcia RN  Pressure Reduction Techniques:   frequent weight shift encouraged   weight shift assistance provided  Head of Bed (HOB): HOB at 30-45 degrees  Pressure Reduction Devices: pressure-redistributing mattress utilized  Skin Protection:   adhesive use limited   tubing/devices free from skin contact     Problem: Breathing Pattern Ineffective  Goal: Effective Breathing Pattern  Outcome: Ongoing, Progressing  Intervention: Promote Improved Breathing Pattern  Recent Flowsheet Documentation  Taken 8/21/2021 1641 by Umm Garcia RN  Head of Bed (HOB): HOB at 30-45 degrees  Taken 8/21/2021 1221 by Umm Garcia RN  Head of Bed (HOB): (Sitting in the chair) other (see comments)  Taken 8/21/2021 0845 by Umm Garcia RN  Head of Bed (HOB): HOB at 30-45 degrees     Problem: Noninvasive Ventilation Acute  Goal: Effective Unassisted Ventilation and Oxygenation  Outcome: Ongoing, Progressing     Problem: Fluid Volume Excess  Goal: Fluid Balance  Outcome: Ongoing, Progressing  Intervention: Monitor and Manage Hypervolemia  Recent Flowsheet Documentation  Taken 8/21/2021 1641 by Umm Garcia RN  Skin Protection:   adhesive use limited   tubing/devices free from skin contact  Taken 8/21/2021 1221 by Umm Garcia RN  Skin Protection:   adhesive use limited   tubing/devices free from skin contact  Taken 8/21/2021 0845 by Umm Garcia RN  Skin Protection:   adhesive use limited   tubing/devices free from skin contact   Goal Outcome Evaluation:         Patient did not have any new tests or procedures today. The patient is on 2L of oxygen. The patient complained of constipation and was given medication to help relieve this issue. See MAR. The patient did not have any complaints of pain. Will continue to monitor.

## 2021-08-21 NOTE — PROGRESS NOTES
[facility] Daily Progress Note    Patient Name: Benson Mclean  : 1950  MRN: [id number]  Primary Care Physician:  Samantha Zabala APRN  Date of admission: 8/3/2021      Subjective      Chief Complaint: Shortness of breath.      Patient Reports     21: s/p HD yesterday.  Feels better.  On 65% FiO2.  Informed the patient that he is getting decreased insulin dose to minimize hypoglycemia.  21: Feels better.  Planned HD today.  On 50% FiO2.  21: Feels better.  21: OOB to chair.  No complaints.  Permacath to be placed today.  Case management working on arranging dialysis chair.  21: Constipation.  Ordered stool softeners.  Permacath scheduled for 21.  On 2L now.  At baseline uses wheelchair/walker.  Encouraged OOB to chair    Review of Systems   All other systems reviewed and are negative.         Objective      Vitals:   Temp:  [97 °F (36.1 °C)-100.1 °F (37.8 °C)] 98.5 °F (36.9 °C)  Heart Rate:  [] 68  Resp:  [14-23] 16  BP: ()/(41-62) 102/48  Flow (L/min):  [2-2.5] 2    Physical Exam  Constitutional:       General: He is not in acute distress.  HENT:      Head: Normocephalic and atraumatic.      Nose: Nose normal.   Eyes:      General: No scleral icterus.     Extraocular Movements: Extraocular movements intact.      Pupils: Pupils are equal, round, and reactive to light.   Cardiovascular:      Rate and Rhythm: Normal rate and regular rhythm.   Pulmonary:      Effort: Pulmonary effort is normal.      Breath sounds: Examination of the right-lower field reveals decreased breath sounds. Examination of the left-lower field reveals decreased breath sounds. Decreased breath sounds present.   Abdominal:      General: Bowel sounds are normal.      Comments: Obese abdomen.   Musculoskeletal:      Cervical back: Normal range of motion.      Comments: Decreased range of motion hips   Lymphadenopathy:      Cervical: No cervical  adenopathy.   Skin:     General: Skin is warm.      Findings: No rash.   Neurological:      Mental Status: He is alert.      Cranial Nerves: Cranial nerves are intact.   Psychiatric:         Mood and Affect: Mood normal.           Result Review    Result Review:  I have personally reviewed the results from the time of this admission to 8/21/2021 12:09 EDT and agree with these findings:  [x]  Laboratory  []  Microbiology  []  Radiology  []  EKG/Telemetry   [x]  Cardiology/Vascular   []  Pathology  [x]  Old records  []  Other:            Assessment/Plan      Brief Patient Summary:    70 years old male initially admitted to [facility]  for A. fib RVR and fluid overload and eventually transfer to ICU for worsening hypoxemia and eventual initiation on hemodialysis for worsening DILEEP.       amiodarone, 200 mg, Oral, Q12H  aspirin, 81 mg, Oral, Daily  atorvastatin, 40 mg, Oral, Nightly  budesonide, 0.5 mg, Nebulization, BID - RT  carvedilol, 12.5 mg, Oral, Q12H  cholecalciferol, 1,000 Units, Oral, Daily  dilTIAZem CD, 240 mg, Oral, Q24H  donepezil, 10 mg, Oral, Nightly  DULoxetine, 60 mg, Oral, Daily  epoetin izabella-epbx, 20,000 Units, Subcutaneous, Weekly  insulin lispro, 0-24 Units, Subcutaneous, 4x Daily With Meals & Nightly  ipratropium-albuterol, 3 mL, Nebulization, Q4H - RT  isosorbide mononitrate, 60 mg, Oral, BID - Nitrates  polyethylene glycol, 17 g, Oral, Daily  senna-docusate sodium, 2 tablet, Oral, BID  sodium chloride, 10 mL, Intravenous, Q12H  vitamin B-12, 1,000 mcg, Oral, Daily       hold, 1 each         Active Hospital Problems:  Active Hospital Problems    Diagnosis    • **Acute on chronic systolic heart failure (CMS/HCC)    • Acute respiratory failure with hypoxia and hypercapnia (CMS/HCC)    • Acute kidney injury (CMS/HCC)    • Anemia of renal disease    • CKD (chronic kidney disease) stage 3, GFR 30-59 ml/min (CMS/HCC)    • Type 2 diabetes mellitus with hyperglycemia (CMS/HCC)    • Mixed hyperlipidemia    •  Obesity    • History of cerebrovascular accident    • Chronic obstructive pulmonary disease, unspecified (CMS/HCC)    • Flaccid hemiplegia of right dominant side as late effect of cerebral infarction (CMS/[facility])    • Unspecified dementia with behavioral disturbance (CMS/[facility])    • Major depressive disorder, recurrent, mild (CMS/HCC)    • Paroxysmal atrial fibrillation (CMS/HCC)    • Elevated troponin    • Essential hypertension    • S/P CABG (coronary artery bypass graft)    • Chronic venous hypertension (idiopathic) with ulcer and inflammation of bilateral lower extremity (CMS/[facility])    • Chronic foot ulcer (CMS/[facility])    • Coronary artery disease    • Vitamin D deficiency    • ANNETTE treated with BiPAP    • Diabetic neuropathy (CMS/[facility])           Acute on chronic systolic heart failure   -Improved with hemodialysis and diuretics  -on  Coreg, aspirin and Imdur   -ACE-I/ARB contraindicated d/t DILEEP     Acute respiratory failure with hypoxia and hypercapnia: Improving  -Not on home oxygen  -secondary to fluid overload and reason for transfer to ICU on 8/14/2020  -Initially on precision flow now on nasal cannula  -titrate O2 to keep sat >90%  -AVAPS PRN  -pulmonary following     Acute kidney injury, CKD (chronic kidney disease) stage III complicated with fluid overload (POA)  -nephrology following  -Right IJ Shiley inserted and hemodialysis initiated on 08/14/21  -Permacath 8/23/2021  -Social work working on dialysis chair        Elevated troponin:  -Denies chest pain  -likely demand ischemia  -cardiology following     Paroxysmal atrial fibrillation with RVR (POA)  -continue amiodarone, diltiazem CD, Coreg  -Eliquis held due to plans for permacath placement 8/23/2021  -Cardiology following     Coronary artery disease, S/P CABG / Essential hypertension, chronic / Mixed hyperlipidemia  -continue aspirin, Eliquis, statin, Bumex, carvedilol, diltiazem CD, hydralazine, Imdur, and metolazone  -monitor BP     ANNETTE:  -Claims to be  compliant with CPAP at home  -currently using hospital AVAP     Major depressive disorder:  -continue Cymbalta      History of cerebrovascular accident  -Flaccid hemiplegia of right dominant side as late effect of cerebral infarction   -At baseline walks with a walker wheelchair at home  -fall precautions  -continue aspirin, Elqius, and statin      Type 2 diabetes mellitus with hyperglycemia complicated with Diabetic neuropathy   -A1c 7.4% on 07/21/21  -Continue ISS     Mild dementia with behavioral disturbance  -continue donepezil      Obesity  -BMI 37.39   -encourage lifestyle modifications      Vitamin D deficiency  -continue cholecalciferol      Chronic obstructive pulmonary disease:  - former smoker  -stable without exacerbation  -continue bronchodilators      Anemia of chronic disease   -on weekly Retacrit   -Monitor H&H    DVT prophylaxis:  Medical DVT prophylaxis orders are present.    CODE STATUS:    Code Status: CPR  Medical Interventions (Level of Support Prior to Arrest): Full      Disposition:  I expect patient to be discharged rehab.    This patient has been examined wearing appropriate Personal Protective Equipment and discussed with nursing. 08/21/21      Electronically signed by Maurilio Boucher DO, 08/21/21, 12:09 EDT.  [facility]ist Team

## 2021-08-21 NOTE — PLAN OF CARE
Goal Outcome Evaluation:                   69 y/o M admitted for acute on chronic systolic HF. Full Code, Pt had R IJ placed for hemodialysis. Blood return noted, flushed, Labs drawn this AM. Lovenox has been paused-Pt to have procedure for permanent placement for dialysis Tx's. JAZZY FLORES- 8/15. D/C plan is back to [place] after stabilization. Pt is c/o long period between Bm's. Will f/u w/ day shift to see if provider will add stool softener to Tx plan. Will monitor Pt throughout remainder of the shift. Pt has remained stable on 2 L O2 this shift.        Problem: Arrhythmia/Dysrhythmia  Goal: Normalized Cardiac Rhythm  Outcome: Ongoing, Progressing  Intervention: Monitor and Manage Cardiac Rhythm Effects  Recent Flowsheet Documentation  Taken 8/20/2021 2000 by Rikki Christiansen RN  VTE Prevention/Management: (Lovenox stopped for upcoming procedure) other (see comments)     Problem: Fall Injury Risk  Goal: Absence of Fall and Fall-Related Injury  Outcome: Ongoing, Progressing  Intervention: Identify and Manage Contributors to Fall Injury Risk  Recent Flowsheet Documentation  Taken 8/21/2021 0420 by Rikki Christiansen RN  Medication Review/Management: medications reviewed  Taken 8/21/2021 0015 by Rikki Christiansen RN  Medication Review/Management: medications reviewed  Intervention: Promote Injury-Free Environment  Recent Flowsheet Documentation  Taken 8/21/2021 0544 by Rikki Christiansen RN  Safety Promotion/Fall Prevention:   safety round/check completed   room organization consistent   nonskid shoes/slippers when out of bed   fall prevention program maintained   clutter free environment maintained   assistive device/personal items within reach  Taken 8/21/2021 0420 by Rikki Christiansen RN  Safety Promotion/Fall Prevention:   safety round/check completed   room organization consistent   nonskid shoes/slippers when out of bed   lighting adjusted   fall prevention program maintained   clutter free environment  maintained   assistive device/personal items within reach   activity supervised  Taken 8/21/2021 0200 by Rikki Christiansen RN  Safety Promotion/Fall Prevention:   safety round/check completed   room organization consistent   nonskid shoes/slippers when out of bed   fall prevention program maintained   clutter free environment maintained   assistive device/personal items within reach  Taken 8/21/2021 0015 by Rikki Christiansen RN  Safety Promotion/Fall Prevention:   safety round/check completed   room organization consistent   nonskid shoes/slippers when out of bed   lighting adjusted   fall prevention program maintained   clutter free environment maintained   assistive device/personal items within reach   activity supervised     Problem: Adult Inpatient Plan of Care  Goal: Plan of Care Review  Outcome: Ongoing, Progressing  Goal: Patient-Specific Goal (Individualized)  Outcome: Ongoing, Progressing  Goal: Absence of Hospital-Acquired Illness or Injury  Outcome: Ongoing, Progressing  Intervention: Identify and Manage Fall Risk  Recent Flowsheet Documentation  Taken 8/21/2021 0544 by Rikki Christiansen RN  Safety Promotion/Fall Prevention:   safety round/check completed   room organization consistent   nonskid shoes/slippers when out of bed   fall prevention program maintained   clutter free environment maintained   assistive device/personal items within reach  Taken 8/21/2021 0420 by Rikki Christiansen RN  Safety Promotion/Fall Prevention:   safety round/check completed   room organization consistent   nonskid shoes/slippers when out of bed   lighting adjusted   fall prevention program maintained   clutter free environment maintained   assistive device/personal items within reach   activity supervised  Taken 8/21/2021 0200 by Rikki Christiansen RN  Safety Promotion/Fall Prevention:   safety round/check completed   room organization consistent   nonskid shoes/slippers when out of bed   fall prevention program maintained    clutter free environment maintained   assistive device/personal items within reach  Taken 8/21/2021 0015 by Rikki Christiansen RN  Safety Promotion/Fall Prevention:   safety round/check completed   room organization consistent   nonskid shoes/slippers when out of bed   lighting adjusted   fall prevention program maintained   clutter free environment maintained   assistive device/personal items within reach   activity supervised  Intervention: Prevent Skin Injury  Recent Flowsheet Documentation  Taken 8/21/2021 0420 by Rikki Christiansen RN  Skin Protection:   adhesive use limited   incontinence pads utilized   skin-to-device areas padded   tubing/devices free from skin contact  Taken 8/21/2021 0015 by Rikki Christiansen RN  Skin Protection:   adhesive use limited   incontinence pads utilized   skin-to-device areas padded   tubing/devices free from skin contact  Taken 8/20/2021 2000 by Rikki Christiansen RN  Skin Protection:   adhesive use limited   incontinence pads utilized   skin-to-device areas padded   tubing/devices free from skin contact  Intervention: Prevent and Manage VTE (venous thromboembolism) Risk  Recent Flowsheet Documentation  Taken 8/20/2021 2000 by Rikki Christiansen RN  VTE Prevention/Management: (Lovenox stopped for upcoming procedure) other (see comments)  Intervention: Prevent Infection  Recent Flowsheet Documentation  Taken 8/21/2021 0544 by Rikki Christiansen RN  Infection Prevention:   hand hygiene promoted   personal protective equipment utilized   rest/sleep promoted  Taken 8/21/2021 0420 by Rikki Christiansen RN  Infection Prevention:   hand hygiene promoted   personal protective equipment utilized   rest/sleep promoted  Taken 8/21/2021 0200 by Rikki Christiansen RN  Infection Prevention:   hand hygiene promoted   personal protective equipment utilized   rest/sleep promoted  Taken 8/21/2021 0015 by Rikki Christiansen RN  Infection Prevention:   hand hygiene promoted   personal protective equipment  utilized   rest/sleep promoted  Taken 8/20/2021 2200 by Rikki Christiansen RN  Infection Prevention:   hand hygiene promoted   personal protective equipment utilized   rest/sleep promoted  Taken 8/20/2021 2000 by Rikki Christiansen RN  Infection Prevention:   hand hygiene promoted   personal protective equipment utilized   rest/sleep promoted  Goal: Optimal Comfort and Wellbeing  Outcome: Ongoing, Progressing  Intervention: Provide Person-Centered Care  Recent Flowsheet Documentation  Taken 8/21/2021 0420 by Rikki Christiansen RN  Trust Relationship/Rapport:   reassurance provided   thoughts/feelings acknowledged  Taken 8/21/2021 0015 by Rikki Christiansen RN  Trust Relationship/Rapport:   thoughts/feelings acknowledged   reassurance provided  Taken 8/20/2021 2000 by Rikki Christiansen RN  Trust Relationship/Rapport:   care explained   choices provided   emotional support provided   empathic listening provided   questions answered   questions encouraged   reassurance provided   thoughts/feelings acknowledged  Goal: Readiness for Transition of Care  Outcome: Ongoing, Progressing     Problem: Skin Injury Risk Increased  Goal: Skin Health and Integrity  Outcome: Ongoing, Progressing  Intervention: Optimize Skin Protection  Recent Flowsheet Documentation  Taken 8/21/2021 0420 by Rikki Christiansen RN  Pressure Reduction Techniques:   frequent weight shift encouraged   positioned off wounds   pressure points protected   weight shift assistance provided  Pressure Reduction Devices:   positioning supports utilized   pressure-redistributing mattress utilized  Skin Protection:   adhesive use limited   incontinence pads utilized   skin-to-device areas padded   tubing/devices free from skin contact  Taken 8/21/2021 0015 by Rikki Christiansen RN  Pressure Reduction Techniques:   frequent weight shift encouraged   positioned off wounds   pressure points protected   weight shift assistance provided  Pressure Reduction Devices:    positioning supports utilized   pressure-redistributing mattress utilized  Skin Protection:   adhesive use limited   incontinence pads utilized   skin-to-device areas padded   tubing/devices free from skin contact  Taken 8/20/2021 2000 by Rikki Christiansen RN  Pressure Reduction Techniques:   frequent weight shift encouraged   positioned off wounds   pressure points protected  Pressure Reduction Devices:   positioning supports utilized   pressure-redistributing mattress utilized  Skin Protection:   adhesive use limited   incontinence pads utilized   skin-to-device areas padded   tubing/devices free from skin contact     Problem: Breathing Pattern Ineffective  Goal: Effective Breathing Pattern  Outcome: Ongoing, Progressing  Intervention: Promote Improved Breathing Pattern  Recent Flowsheet Documentation  Taken 8/21/2021 0420 by Rikki Christiansen RN  Airway/Ventilation Management: airway patency maintained  Taken 8/21/2021 0015 by Rikki Christiansen RN  Supportive Measures: active listening utilized  Taken 8/20/2021 2000 by Rikki Christiansen RN  Supportive Measures: active listening utilized  Airway/Ventilation Management: airway patency maintained     Problem: Noninvasive Ventilation Acute  Goal: Effective Unassisted Ventilation and Oxygenation  Outcome: Ongoing, Progressing  Intervention: Monitor and Manage Noninvasive Ventilation  Recent Flowsheet Documentation  Taken 8/21/2021 0420 by Rikki Christiansen RN  Airway/Ventilation Management: airway patency maintained  Taken 8/20/2021 2000 by Rikki Christiansen RN  Airway/Ventilation Management: airway patency maintained     Problem: Fluid Volume Excess  Goal: Fluid Balance  Outcome: Ongoing, Progressing  Intervention: Monitor and Manage Hypervolemia  Recent Flowsheet Documentation  Taken 8/21/2021 0420 by Rikki Christiansen RN  Fluid/Electrolyte Management: fluids provided  Skin Protection:   adhesive use limited   incontinence pads utilized   skin-to-device areas  padded   tubing/devices free from skin contact  Taken 8/21/2021 0015 by Rikki Christiansen RN  Skin Protection:   adhesive use limited   incontinence pads utilized   skin-to-device areas padded   tubing/devices free from skin contact  Taken 8/20/2021 2000 by Rikki Christiansen RN  Fluid/Electrolyte Management: fluids provided  Skin Protection:   adhesive use limited   incontinence pads utilized   skin-to-device areas padded   tubing/devices free from skin contact

## 2021-08-22 LAB
ALBUMIN SERPL-MCNC: 3.6 G/DL (ref 3.5–5.2)
ANION GAP SERPL CALCULATED.3IONS-SCNC: 12 MMOL/L (ref 5–15)
BASOPHILS # BLD AUTO: 0.1 10*3/MM3 (ref 0–0.2)
BASOPHILS NFR BLD AUTO: 0.9 % (ref 0–1.5)
BUN SERPL-MCNC: 46 MG/DL (ref 8–23)
BUN/CREAT SERPL: 13.6 (ref 7–25)
CALCIUM SPEC-SCNC: 8.5 MG/DL (ref 8.6–10.5)
CHLORIDE SERPL-SCNC: 93 MMOL/L (ref 98–107)
CO2 SERPL-SCNC: 27 MMOL/L (ref 22–29)
CREAT SERPL-MCNC: 3.37 MG/DL (ref 0.76–1.27)
DEPRECATED RDW RBC AUTO: 50.3 FL (ref 37–54)
EOSINOPHIL # BLD AUTO: 0.6 10*3/MM3 (ref 0–0.4)
EOSINOPHIL NFR BLD AUTO: 7 % (ref 0.3–6.2)
ERYTHROCYTE [DISTWIDTH] IN BLOOD BY AUTOMATED COUNT: 16.4 % (ref 12.3–15.4)
GFR SERPL CREATININE-BSD FRML MDRD: 18 ML/MIN/1.73
GLUCOSE BLDC GLUCOMTR-MCNC: 144 MG/DL (ref 70–105)
GLUCOSE BLDC GLUCOMTR-MCNC: 182 MG/DL (ref 70–105)
GLUCOSE BLDC GLUCOMTR-MCNC: 195 MG/DL (ref 70–105)
GLUCOSE BLDC GLUCOMTR-MCNC: 228 MG/DL (ref 70–105)
GLUCOSE SERPL-MCNC: 186 MG/DL (ref 65–99)
HCT VFR BLD AUTO: 26 % (ref 37.5–51)
HGB BLD-MCNC: 8.7 G/DL (ref 13–17.7)
LYMPHOCYTES # BLD AUTO: 1.9 10*3/MM3 (ref 0.7–3.1)
LYMPHOCYTES NFR BLD AUTO: 22.4 % (ref 19.6–45.3)
MCH RBC QN AUTO: 28.7 PG (ref 26.6–33)
MCHC RBC AUTO-ENTMCNC: 33.4 G/DL (ref 31.5–35.7)
MCV RBC AUTO: 86 FL (ref 79–97)
MONOCYTES # BLD AUTO: 1.2 10*3/MM3 (ref 0.1–0.9)
MONOCYTES NFR BLD AUTO: 14.5 % (ref 5–12)
NEUTROPHILS NFR BLD AUTO: 4.7 10*3/MM3 (ref 1.7–7)
NEUTROPHILS NFR BLD AUTO: 55.2 % (ref 42.7–76)
NRBC BLD AUTO-RTO: 0 /100 WBC (ref 0–0.2)
PHOSPHATE SERPL-MCNC: 6.2 MG/DL (ref 2.5–4.5)
PLATELET # BLD AUTO: 425 10*3/MM3 (ref 140–450)
PMV BLD AUTO: 7.4 FL (ref 6–12)
POTASSIUM SERPL-SCNC: 5 MMOL/L (ref 3.5–5.2)
RBC # BLD AUTO: 3.02 10*6/MM3 (ref 4.14–5.8)
SODIUM SERPL-SCNC: 132 MMOL/L (ref 136–145)
WBC # BLD AUTO: 8.4 10*3/MM3 (ref 3.4–10.8)

## 2021-08-22 PROCEDURE — 94799 UNLISTED PULMONARY SVC/PX: CPT

## 2021-08-22 PROCEDURE — 82962 GLUCOSE BLOOD TEST: CPT

## 2021-08-22 PROCEDURE — 99232 SBSQ HOSP IP/OBS MODERATE 35: CPT | Performed by: HOSPITALIST

## 2021-08-22 PROCEDURE — 85025 COMPLETE CBC W/AUTO DIFF WBC: CPT | Performed by: INTERNAL MEDICINE

## 2021-08-22 PROCEDURE — 80069 RENAL FUNCTION PANEL: CPT | Performed by: INTERNAL MEDICINE

## 2021-08-22 PROCEDURE — 63710000001 INSULIN LISPRO (HUMAN) PER 5 UNITS: Performed by: HOSPITALIST

## 2021-08-22 PROCEDURE — 94660 CPAP INITIATION&MGMT: CPT

## 2021-08-22 RX ADMIN — INSULIN LISPRO 8 UNITS: 100 INJECTION, SOLUTION INTRAVENOUS; SUBCUTANEOUS at 11:18

## 2021-08-22 RX ADMIN — CYANOCOBALAMIN TAB 1000 MCG 1000 MCG: 1000 TAB at 08:33

## 2021-08-22 RX ADMIN — Medication 1000 UNITS: at 08:32

## 2021-08-22 RX ADMIN — CARVEDILOL 12.5 MG: 6.25 TABLET, FILM COATED ORAL at 20:43

## 2021-08-22 RX ADMIN — ISOSORBIDE MONONITRATE 60 MG: 60 TABLET, EXTENDED RELEASE ORAL at 17:39

## 2021-08-22 RX ADMIN — DILTIAZEM HYDROCHLORIDE 240 MG: 240 CAPSULE, COATED, EXTENDED RELEASE ORAL at 08:34

## 2021-08-22 RX ADMIN — DONEPEZIL HYDROCHLORIDE 10 MG: 5 TABLET, FILM COATED ORAL at 20:43

## 2021-08-22 RX ADMIN — IPRATROPIUM BROMIDE AND ALBUTEROL SULFATE 3 ML: 2.5; .5 SOLUTION RESPIRATORY (INHALATION) at 19:14

## 2021-08-22 RX ADMIN — CARVEDILOL 12.5 MG: 6.25 TABLET, FILM COATED ORAL at 08:33

## 2021-08-22 RX ADMIN — IPRATROPIUM BROMIDE AND ALBUTEROL SULFATE 3 ML: 2.5; .5 SOLUTION RESPIRATORY (INHALATION) at 11:27

## 2021-08-22 RX ADMIN — DOCUSATE SODIUM 50 MG AND SENNOSIDES 8.6 MG 2 TABLET: 8.6; 5 TABLET, FILM COATED ORAL at 20:43

## 2021-08-22 RX ADMIN — ISOSORBIDE MONONITRATE 60 MG: 60 TABLET, EXTENDED RELEASE ORAL at 08:33

## 2021-08-22 RX ADMIN — DOCUSATE SODIUM 50 MG AND SENNOSIDES 8.6 MG 2 TABLET: 8.6; 5 TABLET, FILM COATED ORAL at 08:32

## 2021-08-22 RX ADMIN — POLYETHYLENE GLYCOL 3350 17 G: 17 POWDER, FOR SOLUTION ORAL at 08:34

## 2021-08-22 RX ADMIN — AMIODARONE HYDROCHLORIDE 200 MG: 200 TABLET ORAL at 08:33

## 2021-08-22 RX ADMIN — INSULIN LISPRO 4 UNITS: 100 INJECTION, SOLUTION INTRAVENOUS; SUBCUTANEOUS at 17:38

## 2021-08-22 RX ADMIN — DULOXETINE 60 MG: 30 CAPSULE, DELAYED RELEASE ORAL at 08:33

## 2021-08-22 RX ADMIN — INSULIN LISPRO 4 UNITS: 100 INJECTION, SOLUTION INTRAVENOUS; SUBCUTANEOUS at 08:31

## 2021-08-22 RX ADMIN — Medication 10 ML: at 08:33

## 2021-08-22 RX ADMIN — Medication 10 ML: at 20:42

## 2021-08-22 RX ADMIN — AMIODARONE HYDROCHLORIDE 200 MG: 200 TABLET ORAL at 20:43

## 2021-08-22 RX ADMIN — BUDESONIDE 0.5 MG: 0.5 SUSPENSION RESPIRATORY (INHALATION) at 19:20

## 2021-08-22 RX ADMIN — ASPIRIN 81 MG: 81 TABLET, CHEWABLE ORAL at 08:33

## 2021-08-22 RX ADMIN — ATORVASTATIN CALCIUM 40 MG: 40 TABLET, FILM COATED ORAL at 20:43

## 2021-08-22 NOTE — PLAN OF CARE
Goal Outcome Evaluation:  Plan of Care Reviewed With: patient    Plan:  Per Dr. Whitley's note, dialysis and perma-cath procedure planned for Monday.    Patient is on 2L of oxygen with saturation at 100%, will work on weaning the oxygen today.    Skin:  blanching redness to the coccyx, patient is on the turn team.    Fall risk:  yes, fall precautions in place, patient is a 2 person assist and uses a walker.      Problem: Arrhythmia/Dysrhythmia  Goal: Normalized Cardiac Rhythm  Outcome: Ongoing, Progressing  Intervention: Monitor and Manage Cardiac Rhythm Effects  Recent Flowsheet Documentation  Taken 8/22/2021 0900 by Brittney Franklin RN  Dysrhythmia Management: (medication) other (see comments)  VTE Prevention/Management: (eliquis stopped for procedure) other (see comments)     Problem: Fall Injury Risk  Goal: Absence of Fall and Fall-Related Injury  Outcome: Ongoing, Progressing  Intervention: Identify and Manage Contributors to Fall Injury Risk  Recent Flowsheet Documentation  Taken 8/22/2021 0900 by Brittney Franklin RN  Medication Review/Management: medications reviewed  Taken 8/22/2021 0800 by Brittney Franklin RN  Medication Review/Management: medications reviewed  Intervention: Promote Injury-Free Environment  Recent Flowsheet Documentation  Taken 8/22/2021 0900 by Brittney Franklin RN  Safety Promotion/Fall Prevention:   assistive device/personal items within reach   clutter free environment maintained   fall prevention program maintained   nonskid shoes/slippers when out of bed   safety round/check completed  Taken 8/22/2021 0800 by Brittney Franklin RN  Safety Promotion/Fall Prevention:   assistive device/personal items within reach   clutter free environment maintained   fall prevention program maintained   nonskid shoes/slippers when out of bed   safety round/check completed     Problem: Adult Inpatient Plan of Care  Goal: Plan of Care Review  Outcome: Ongoing, Progressing  Flowsheets (Taken 8/22/2021 0921)  Plan of  Care Reviewed With: patient  Goal: Patient-Specific Goal (Individualized)  Outcome: Ongoing, Progressing  Goal: Absence of Hospital-Acquired Illness or Injury  Outcome: Ongoing, Progressing  Intervention: Identify and Manage Fall Risk  Recent Flowsheet Documentation  Taken 8/22/2021 0900 by Brittney Franklin RN  Safety Promotion/Fall Prevention:   assistive device/personal items within reach   clutter free environment maintained   fall prevention program maintained   nonskid shoes/slippers when out of bed   safety round/check completed  Taken 8/22/2021 0800 by Brittney Franklin RN  Safety Promotion/Fall Prevention:   assistive device/personal items within reach   clutter free environment maintained   fall prevention program maintained   nonskid shoes/slippers when out of bed   safety round/check completed  Intervention: Prevent Skin Injury  Recent Flowsheet Documentation  Taken 8/22/2021 0900 by Brittney Franklin RN  Body Position: position changed independently  Intervention: Prevent and Manage VTE (venous thromboembolism) Risk  Recent Flowsheet Documentation  Taken 8/22/2021 0900 by Brittney Franklin RN  VTE Prevention/Management: (eliquis stopped for procedure) other (see comments)  Intervention: Prevent Infection  Recent Flowsheet Documentation  Taken 8/22/2021 0800 by Brittney Franklin RN  Infection Prevention: single patient room provided  Goal: Optimal Comfort and Wellbeing  Outcome: Ongoing, Progressing  Intervention: Provide Person-Centered Care  Recent Flowsheet Documentation  Taken 8/22/2021 0900 by Brittney Franklin RN  Trust Relationship/Rapport: care explained  Goal: Readiness for Transition of Care  Outcome: Ongoing, Progressing     Problem: Skin Injury Risk Increased  Goal: Skin Health and Integrity  Outcome: Ongoing, Progressing     Problem: Breathing Pattern Ineffective  Goal: Effective Breathing Pattern  Outcome: Ongoing, Progressing  Intervention: Promote Improved Breathing Pattern  Recent Flowsheet  Documentation  Taken 8/22/2021 0900 by Brittney Franklin RN  Supportive Measures: active listening utilized     Problem: Noninvasive Ventilation Acute  Goal: Effective Unassisted Ventilation and Oxygenation  Outcome: Ongoing, Progressing     Problem: Fluid Volume Excess  Goal: Fluid Balance  Outcome: Ongoing, Progressing     Problem: Fall Injury Risk  Goal: Absence of Fall and Fall-Related Injury  Outcome: Ongoing, Progressing  Intervention: Identify and Manage Contributors to Fall Injury Risk  Recent Flowsheet Documentation  Taken 8/22/2021 0900 by Brittney Franklin RN  Medication Review/Management: medications reviewed  Taken 8/22/2021 0800 by Brittney Franklin RN  Medication Review/Management: medications reviewed  Intervention: Promote Injury-Free Environment  Recent Flowsheet Documentation  Taken 8/22/2021 0900 by Brittney Franklin RN  Safety Promotion/Fall Prevention:   assistive device/personal items within reach   clutter free environment maintained   fall prevention program maintained   nonskid shoes/slippers when out of bed   safety round/check completed  Taken 8/22/2021 0800 by Brittney Franklin RN  Safety Promotion/Fall Prevention:   assistive device/personal items within reach   clutter free environment maintained   fall prevention program maintained   nonskid shoes/slippers when out of bed   safety round/check completed     Problem: Skin Injury Risk Increased  Goal: Skin Health and Integrity  Outcome: Ongoing, Progressing     Intervention: Promote Improved Breathing Pattern  Recent Flowsheet Documentation  Taken 8/22/2021 0900 by Brittney Franklin RN  Supportive Measures: active listening utilized

## 2021-08-22 NOTE — PROGRESS NOTES
"RENAL/KCC:     LOS: 19 days    Patient Care Team:  Samantha Zabala APRN as PCP - General  Samantha Zabala APRN as PCP - Family Medicine  Jose G Rm MD as Consulting Physician (Cardiology)    Chief Complaint:  Palpitations    Subjective     Interval History:   No acute events. Reports poor UOP  Denies sob or chest pain.     Objective     Vital Sign Min/Max for last 24 hours  Temp  Min: 97.6 °F (36.4 °C)  Max: 98.5 °F (36.9 °C)   BP  Min: 77/47  Max: 102/48   Pulse  Min: 55  Max: 77   Resp  Min: 15  Max: 19   SpO2  Min: 96 %  Max: 100 %   Flow (L/min)  Min: 1  Max: 2   Weight  Min: 104 kg (229 lb 2.3 oz)  Max: 104 kg (229 lb 2.3 oz)     Flowsheet Rows      First Filed Value   Admission Height  180.3 cm (71\") Documented at 08/03/2021 1733   Admission Weight  113 kg (250 lb) Documented at 08/03/2021 1733          No intake/output data recorded.  I/O last 3 completed shifts:  In: 240 [P.O.:240]  Out: 125 [Urine:125]    Physical Exam:  GEN: Awake, NAD  ENT: PERRL, EOMI, MMM  NECK: Supple, no JVD  CHEST: CTAB, no W/R/C  CV: RRR, no M/G/R  ABD: Soft, NT, +BS  SKIN: Warm and Dry  NEURO: CN's intact      WBC WBC   Date Value Ref Range Status   08/22/2021 8.40 3.40 - 10.80 10*3/mm3 Final   08/21/2021 9.00 3.40 - 10.80 10*3/mm3 Final   08/20/2021 10.70 3.40 - 10.80 10*3/mm3 Final      HGB Hemoglobin   Date Value Ref Range Status   08/22/2021 8.7 (L) 13.0 - 17.7 g/dL Final   08/21/2021 9.0 (L) 13.0 - 17.7 g/dL Final   08/20/2021 9.2 (L) 13.0 - 17.7 g/dL Final      HCT Hematocrit   Date Value Ref Range Status   08/22/2021 26.0 (L) 37.5 - 51.0 % Final   08/21/2021 27.3 (L) 37.5 - 51.0 % Final   08/20/2021 28.0 (L) 37.5 - 51.0 % Final      Platlets No results found for: LABPLAT   MCV MCV   Date Value Ref Range Status   08/22/2021 86.0 79.0 - 97.0 fL Final   08/21/2021 86.4 79.0 - 97.0 fL Final   08/20/2021 86.4 79.0 - 97.0 fL Final          Sodium Sodium   Date Value Ref Range Status   08/22/2021 132 (L) 136 - 145 mmol/L Final "   08/21/2021 133 (L) 136 - 145 mmol/L Final   08/20/2021 133 (L) 136 - 145 mmol/L Final      Potassium Potassium   Date Value Ref Range Status   08/22/2021 5.0 3.5 - 5.2 mmol/L Final   08/21/2021 4.8 3.5 - 5.2 mmol/L Final   08/20/2021 4.3 3.5 - 5.2 mmol/L Final      Chloride Chloride   Date Value Ref Range Status   08/22/2021 93 (L) 98 - 107 mmol/L Final   08/21/2021 94 (L) 98 - 107 mmol/L Final   08/20/2021 93 (L) 98 - 107 mmol/L Final      CO2 CO2   Date Value Ref Range Status   08/22/2021 27.0 22.0 - 29.0 mmol/L Final   08/21/2021 29.0 22.0 - 29.0 mmol/L Final   08/20/2021 29.0 22.0 - 29.0 mmol/L Final      BUN BUN   Date Value Ref Range Status   08/22/2021 46 (H) 8 - 23 mg/dL Final     Comment:     Result checked   08/21/2021 29 (H) 8 - 23 mg/dL Final   08/20/2021 37 (H) 8 - 23 mg/dL Final      Creatinine Creatinine   Date Value Ref Range Status   08/22/2021 3.37 (H) 0.76 - 1.27 mg/dL Final   08/21/2021 2.57 (H) 0.76 - 1.27 mg/dL Final   08/20/2021 2.52 (H) 0.76 - 1.27 mg/dL Final      Calcium Calcium   Date Value Ref Range Status   08/22/2021 8.5 (L) 8.6 - 10.5 mg/dL Final   08/21/2021 8.6 8.6 - 10.5 mg/dL Final   08/20/2021 8.6 8.6 - 10.5 mg/dL Final      PO4 No results found for: CAPO4   Albumin Albumin   Date Value Ref Range Status   08/22/2021 3.60 3.50 - 5.20 g/dL Final   08/21/2021 3.70 3.50 - 5.20 g/dL Final   08/20/2021 3.70 3.50 - 5.20 g/dL Final      Magnesium No results found for: MG   Uric Acid No results found for: URICACID        Results Review:     I reviewed the patient's new clinical results.    amiodarone, 200 mg, Oral, Q12H  aspirin, 81 mg, Oral, Daily  atorvastatin, 40 mg, Oral, Nightly  budesonide, 0.5 mg, Nebulization, BID - RT  carvedilol, 12.5 mg, Oral, Q12H  cholecalciferol, 1,000 Units, Oral, Daily  dilTIAZem CD, 240 mg, Oral, Q24H  donepezil, 10 mg, Oral, Nightly  DULoxetine, 60 mg, Oral, Daily  epoetin izabella-epbx, 20,000 Units, Subcutaneous, Weekly  insulin lispro, 0-24 Units,  Subcutaneous, 4x Daily With Meals & Nightly  ipratropium-albuterol, 3 mL, Nebulization, Q4H - RT  isosorbide mononitrate, 60 mg, Oral, BID - Nitrates  polyethylene glycol, 17 g, Oral, Daily  senna-docusate sodium, 2 tablet, Oral, BID  sodium chloride, 10 mL, Intravenous, Q12H  vitamin B-12, 1,000 mcg, Oral, Daily      hold, 1 each        Medication Review: Reviewed    Assessment/Plan       Acute on chronic systolic heart failure (CMS/HCC)    S/P CABG (coronary artery bypass graft)    Essential hypertension    Elevated troponin    ANNETTE treated with BiPAP    Major depressive disorder, recurrent, mild (CMS/HCC)    Mixed hyperlipidemia    History of cerebrovascular accident    Flaccid hemiplegia of right dominant side as late effect of cerebral infarction (CMS/[facility])    Diabetic neuropathy (CMS/[facility])    Unspecified dementia with behavioral disturbance (CMS/[facility])    Coronary artery disease    Obesity    Vitamin D deficiency    Chronic venous hypertension (idiopathic) with ulcer and inflammation of bilateral lower extremity (CMS/[facility])    Chronic obstructive pulmonary disease, unspecified (CMS/[facility])    Chronic foot ulcer (CMS/[facility])    Paroxysmal atrial fibrillation (CMS/[facility])    Acute kidney injury (CMS/[facility])    Type 2 diabetes mellitus with hyperglycemia (CMS/[facility])    CKD (chronic kidney disease) stage 3, GFR 30-59 ml/min (CMS/[facility])    Anemia of renal disease    Acute respiratory failure with hypoxia and hypercapnia (CMS/[facility])      1. DILEEP  2. CKD III  3. Hyperkalemia  4. Pulmonary edema  5. Tachycardia  6. CHF  7. T2DM  8. Hypertension  9. Anemia     PLAN:  HD MWF.  Still oliguric. No evidence of renal recovery so far.   Permcath tomorrow.   UF as tolerated   Will follow.     Nancy Whitley M.D.  [facility]

## 2021-08-22 NOTE — PROGRESS NOTES
Daily Progress Note        Acute on chronic systolic heart failure (CMS/Formerly [facility])    S/P CABG (coronary artery bypass graft)    Essential hypertension    Elevated troponin    ANNETTE treated with BiPAP    Major depressive disorder, recurrent, mild (CMS/[facility]ly [facility])    Mixed hyperlipidemia    History of cerebrovascular accident    Flaccid hemiplegia of right dominant side as late effect of cerebral infarction (CMS/Formerly [facility])    Diabetic neuropathy (CMS/[facility]ly [facility])    Unspecified dementia with behavioral disturbance (CMS/Formerly [facility])    Coronary artery disease    Obesity    Vitamin D deficiency    Chronic venous hypertension (idiopathic) with ulcer and inflammation of bilateral lower extremity (CMS/[facility]ly [facility])    Chronic obstructive pulmonary disease, unspecified (CMS/[facility]ly [facility])    Chronic foot ulcer (CMS/[facility]ly [facility])    Paroxysmal atrial fibrillation (CMS/[facility]ly [facility])    Acute kidney injury (CMS/[facility]ly [facility])    Type 2 diabetes mellitus with hyperglycemia (CMS/[facility]ly [facility])    CKD (chronic kidney disease) stage 3, GFR 30-59 ml/min (CMS/[facility]ly [facility])    Anemia of renal disease    Acute respiratory failure with hypoxia and hypercapnia (CMS/[facility])      Assessment    Pneumonia  COPD  Bilateral pleural effusion  ANNETTE uses BiPAP    Atrial fibrillation with RVR   Acute on chronic systolic heart failure  CAD S/P CABG   HTN  Elevated troponin  Hyperlipidemia  Flaccid hemiplegia of right dominant side as late effect of cerebral infarction   DM II with neuropathy   Dementia with behavioral disturbance  Chronic venous hypertension (idiopathic) with ulcer/inflammation of BLE  Chronic foot ulcer   CKD   Tobacco use disorder   Anemia      ECHO 7/21/21  EF 30%  -Cardiology following    Plan    Encourage OOB daily   Titrate oxygen to keep sat > 88%  Diuretic-Bumex 2mg BID per Nephrology  Bronchodilator/Inhaled corticosteroids  Glycemic control  DVT prophylaxis  -Lovenox on hold permanent HD cath placement Monday         LOS: 19 days     Subjective         Objective     Vital signs for last 24 hours:  Vitals:    08/21/21 2336  08/21/21 2338 08/22/21 0228 08/22/21 0633   BP:   91/43 101/44   BP Location:   Left arm Left arm   Patient Position:   Lying Lying   Pulse: 70  57 61   Resp:   16 15   Temp:   97.6 °F (36.4 °C) 97.8 °F (36.6 °C)   TempSrc:   Oral Oral   SpO2: 100%  100% 100%   Weight:  104 kg (229 lb 2.3 oz)     Height:           Intake/Output last 3 shifts:  I/O last 3 completed shifts:  In: 240 [P.O.:240]  Out: 125 [Urine:125]  Intake/Output this shift:  No intake/output data recorded.      Radiology  Imaging Results (Last 24 Hours)     ** No results found for the last 24 hours. **          Labs:  Results from last 7 days   Lab Units 08/22/21  0524   WBC 10*3/mm3 8.40   HEMOGLOBIN g/dL 8.7*   HEMATOCRIT % 26.0*   PLATELETS 10*3/mm3 425     Results from last 7 days   Lab Units 08/22/21  0524   SODIUM mmol/L 132*   POTASSIUM mmol/L 5.0   CHLORIDE mmol/L 93*   CO2 mmol/L 27.0   BUN mg/dL 46*   CREATININE mg/dL 3.37*   CALCIUM mg/dL 8.5*   GLUCOSE mg/dL 186*         Results from last 7 days   Lab Units 08/22/21  0524 08/21/21  0613 08/20/21  0227   ALBUMIN g/dL 3.60 3.70 3.70             Results from last 7 days   Lab Units 08/18/21  0607   MAGNESIUM mg/dL 1.9                   Meds:   SCHEDULE  amiodarone, 200 mg, Oral, Q12H  aspirin, 81 mg, Oral, Daily  atorvastatin, 40 mg, Oral, Nightly  budesonide, 0.5 mg, Nebulization, BID - RT  carvedilol, 12.5 mg, Oral, Q12H  cholecalciferol, 1,000 Units, Oral, Daily  dilTIAZem CD, 240 mg, Oral, Q24H  donepezil, 10 mg, Oral, Nightly  DULoxetine, 60 mg, Oral, Daily  epoetin izabella-epbx, 20,000 Units, Subcutaneous, Weekly  insulin lispro, 0-24 Units, Subcutaneous, 4x Daily With Meals & Nightly  ipratropium-albuterol, 3 mL, Nebulization, Q4H - RT  isosorbide mononitrate, 60 mg, Oral, BID - Nitrates  polyethylene glycol, 17 g, Oral, Daily  senna-docusate sodium, 2 tablet, Oral, BID  sodium chloride, 10 mL, Intravenous, Q12H  vitamin B-12, 1,000 mcg, Oral, Daily      Infusions  hold, 1  each      PRNs  •  acetaminophen **OR** acetaminophen **OR** acetaminophen  •  aluminum-magnesium hydroxide-simethicone  •  dextrose  •  dextrose  •  glucagon (human recombinant)  •  heparin (porcine)  •  hold  •  insulin lispro **AND** insulin lispro  •  melatonin  •  nitroglycerin  •  ondansetron **OR** ondansetron  •  [COMPLETED] Insert peripheral IV **AND** sodium chloride  •  sodium chloride    Physical Exam:  Physical Exam  Cardiovascular:      Heart sounds: Murmur heard.     Pulmonary:      Breath sounds: Rhonchi present.   Abdominal:      Palpations: Abdomen is soft.   Skin:     General: Skin is warm and dry.   Neurological:      Mental Status: He is alert.         ROS  Review of Systems   Respiratory: Positive for shortness of breath.    Neurological: Positive for weakness.       I have reviewed the patient's new clinical results.    Electronically signed by FABIOLA Bui

## 2021-08-22 NOTE — PROGRESS NOTES
[facility] Medicine Services Daily Progress Note    Patient Name: Benson Mclean  : 1950  MRN: [id number]  Primary Care Physician:  Samantha Zabala APRN  Date of admission: 8/3/2021      Subjective      Chief Complaint: Shortness of breath.      Patient Reports     21: s/p HD yesterday.  Feels better.  On 65% FiO2.  Informed the patient that he is getting decreased insulin dose to minimize hypoglycemia.  21: Feels better.  Planned HD today.  On 50% FiO2.  21: Feels better.  21: OOB to chair.  No complaints.  Permacath to be placed today.  Case management working on arranging dialysis chair.  21: Constipation.  Ordered stool softeners.  Permacath scheduled for 21.  On 2L now.  At baseline uses wheelchair/walker.  Encouraged OOB to chair.  21:  + Bowel movement. On 2L.  Feels better.  Not on home oxygen.  Planned permacath tomorrow.      Review of Systems   All other systems reviewed and are negative.         Objective      Vitals:   Temp:  [97.6 °F (36.4 °C)-98.1 °F (36.7 °C)] 98 °F (36.7 °C)  Heart Rate:  [55-70] 62  Resp:  [15-18] 16  BP: ()/(43-59) 109/59  Flow (L/min):  [1-2] 1    Physical Exam  Constitutional:       General: He is not in acute distress.  HENT:      Head: Normocephalic and atraumatic.      Nose: Nose normal.   Eyes:      General: No scleral icterus.     Extraocular Movements: Extraocular movements intact.      Pupils: Pupils are equal, round, and reactive to light.   Cardiovascular:      Rate and Rhythm: Normal rate and regular rhythm.   Pulmonary:      Effort: Pulmonary effort is normal.      Breath sounds: Examination of the right-lower field reveals decreased breath sounds. Examination of the left-lower field reveals decreased breath sounds. Decreased breath sounds present.   Abdominal:      General: Bowel sounds are normal.      Comments: Obese abdomen.   Musculoskeletal:      Cervical back: Normal range of  motion.      Comments: Decreased range of motion hips   Lymphadenopathy:      Cervical: No cervical adenopathy.   Skin:     General: Skin is warm.      Findings: No rash.   Neurological:      Mental Status: He is alert.      Cranial Nerves: Cranial nerves are intact.   Psychiatric:         Mood and Affect: Mood normal.           Result Review    Result Review:  I have personally reviewed the results from the time of this admission to 8/22/2021 14:22 EDT and agree with these findings:  [x]  Laboratory  []  Microbiology  []  Radiology  []  EKG/Telemetry   [x]  Cardiology/Vascular   []  Pathology  [x]  Old records  []  Other:            Assessment/Plan      Brief Patient Summary:    70 years old male initially admitted to [facility]  for A. fib RVR and fluid overload and eventually transfer to ICU for worsening hypoxemia and eventual initiation on hemodialysis for worsening DILEEP.       amiodarone, 200 mg, Oral, Q12H  aspirin, 81 mg, Oral, Daily  atorvastatin, 40 mg, Oral, Nightly  budesonide, 0.5 mg, Nebulization, BID - RT  carvedilol, 12.5 mg, Oral, Q12H  cholecalciferol, 1,000 Units, Oral, Daily  dilTIAZem CD, 240 mg, Oral, Q24H  donepezil, 10 mg, Oral, Nightly  DULoxetine, 60 mg, Oral, Daily  epoetin izabella-epbx, 20,000 Units, Subcutaneous, Weekly  insulin lispro, 0-24 Units, Subcutaneous, 4x Daily With Meals & Nightly  ipratropium-albuterol, 3 mL, Nebulization, Q4H - RT  isosorbide mononitrate, 60 mg, Oral, BID - Nitrates  polyethylene glycol, 17 g, Oral, Daily  senna-docusate sodium, 2 tablet, Oral, BID  sodium chloride, 10 mL, Intravenous, Q12H  vitamin B-12, 1,000 mcg, Oral, Daily       hold, 1 each         Active Hospital Problems:  Active Hospital Problems    Diagnosis    • **Acute on chronic systolic heart failure (CMS/HCC)    • Acute respiratory failure with hypoxia and hypercapnia (CMS/HCC)    • Acute kidney injury (CMS/HCC)    • Anemia of renal disease    • CKD (chronic kidney disease) stage 3, GFR 30-59 ml/min  (CMS/[facility])    • Type 2 diabetes mellitus with hyperglycemia (CMS/[facility])    • Mixed hyperlipidemia    • Obesity    • History of cerebrovascular accident    • Chronic obstructive pulmonary disease, unspecified (CMS/[facility])    • Flaccid hemiplegia of right dominant side as late effect of cerebral infarction (CMS/[facility])    • Unspecified dementia with behavioral disturbance (CMS/[facility])    • Major depressive disorder, recurrent, mild (CMS/[facility])    • Paroxysmal atrial fibrillation (CMS/[facility])    • Elevated troponin    • Essential hypertension    • S/P CABG (coronary artery bypass graft)    • Chronic venous hypertension (idiopathic) with ulcer and inflammation of bilateral lower extremity (CMS/[facility])    • Chronic foot ulcer (CMS/[facility])    • Coronary artery disease    • Vitamin D deficiency    • ANNETTE treated with BiPAP    • Diabetic neuropathy (CMS/[facility])           Acute on chronic systolic heart failure   -Improved with hemodialysis and diuretics  -on  Coreg, aspirin and Imdur   -ACE-I/ARB contraindicated d/t DILEEP     Acute respiratory failure with hypoxia and hypercapnia: Improving  -Not on home oxygen  -secondary to fluid overload and reason for transfer to ICU on 8/14/2020  -Initially on precision flow now on 2L nasal cannula  -titrate O2 to keep sat >90%  -AVAPS PRN  -pulmonary following     Acute kidney injury, CKD (chronic kidney disease) stage III complicated with fluid overload (POA)  -nephrology following  -Right IJ Shiley inserted and hemodialysis initiated on 08/14/21  -Permacath 8/23/2021 and needs outpatient dialysis arranged  -Social work working on dialysis chair        Elevated troponin:  -Denies chest pain  -likely demand ischemia  -cardiology following     Paroxysmal atrial fibrillation with RVR (POA)  -continue amiodarone, diltiazem CD, Coreg  -Eliquis held due to plans for permacath placement 8/23/2021  -Cardiology following     Coronary artery disease, S/P CABG / Essential hypertension, chronic / Mixed  hyperlipidemia  -continue aspirin, Eliquis, statin, Bumex, carvedilol, diltiazem CD, hydralazine, Imdur, and metolazone  -monitor BP     ANNETTE:  -Claims to be compliant with CPAP at home  -currently using hospital AVAP     Major depressive disorder:  -continue Cymbalta      History of cerebrovascular accident  -Flaccid hemiplegia of right dominant side as late effect of cerebral infarction   -At baseline walks with a walker wheelchair at home  -fall precautions  -continue aspirin, Elqius, and statin      Type 2 diabetes mellitus with hyperglycemia complicated with Diabetic neuropathy   -A1c 7.4% on 07/21/21  -Continue ISS     Mild dementia with behavioral disturbance  -continue donepezil      Obesity  -BMI 37.39   -encourage lifestyle modifications      Vitamin D deficiency  -continue cholecalciferol      Chronic obstructive pulmonary disease:  - former smoker  -stable without exacerbation  -continue bronchodilators      Anemia of chronic disease   -on weekly Retacrit   -Monitor H&H    DVT prophylaxis:  Medical DVT prophylaxis orders are present.    CODE STATUS:    Code Status: CPR  Medical Interventions (Level of Support Prior to Arrest): Full      Disposition:  I expect patient to be discharged rehab.    This patient has been examined wearing appropriate Personal Protective Equipment and discussed with nursing. 08/22/21      Electronically signed by Maurilio Boucher DO, 08/22/21, 14:22 EDT.  [facility] Hospitalist Team

## 2021-08-22 NOTE — PLAN OF CARE
Problem: Arrhythmia/Dysrhythmia  Goal: Normalized Cardiac Rhythm  Outcome: Ongoing, Progressing     Problem: Arrhythmia/Dysrhythmia  Goal: Normalized Cardiac Rhythm  Outcome: Ongoing, Progressing   Goal Outcome Evaluation:              Outcome Summary: Patient alert and oriented x 4, down to 2 liter nasal cannula and is tolerating medications.  Lungs sound clear at this time and he is currently without pain.  Awaiting placement of a permacath on Monday d/t eloquis having to be stopped 3 days prior to insertion.  Pt's VSS at this time.  Pt was dreading having soap suds enema, did not have to give d/t patient having a large loose bowel movement. Pt states he feels much better and even more so for not having to take an enema!

## 2021-08-23 ENCOUNTER — APPOINTMENT (OUTPATIENT)
Dept: INTERVENTIONAL RADIOLOGY/VASCULAR | Facility: HOSPITAL | Age: 71
End: 2021-08-23

## 2021-08-23 LAB
ALBUMIN SERPL-MCNC: 3.5 G/DL (ref 3.5–5.2)
ANION GAP SERPL CALCULATED.3IONS-SCNC: 12 MMOL/L (ref 5–15)
BASOPHILS # BLD AUTO: 0.1 10*3/MM3 (ref 0–0.2)
BASOPHILS NFR BLD AUTO: 1.3 % (ref 0–1.5)
BUN SERPL-MCNC: 59 MG/DL (ref 8–23)
BUN/CREAT SERPL: 14.2 (ref 7–25)
CALCIUM SPEC-SCNC: 8.7 MG/DL (ref 8.6–10.5)
CHLORIDE SERPL-SCNC: 92 MMOL/L (ref 98–107)
CO2 SERPL-SCNC: 26 MMOL/L (ref 22–29)
CREAT SERPL-MCNC: 4.15 MG/DL (ref 0.76–1.27)
DEPRECATED RDW RBC AUTO: 49.4 FL (ref 37–54)
EOSINOPHIL # BLD AUTO: 0.6 10*3/MM3 (ref 0–0.4)
EOSINOPHIL NFR BLD AUTO: 6.5 % (ref 0.3–6.2)
ERYTHROCYTE [DISTWIDTH] IN BLOOD BY AUTOMATED COUNT: 16.4 % (ref 12.3–15.4)
GFR SERPL CREATININE-BSD FRML MDRD: 14 ML/MIN/1.73
GFR SERPL CREATININE-BSD FRML MDRD: ABNORMAL ML/MIN/{1.73_M2}
GLUCOSE BLDC GLUCOMTR-MCNC: 178 MG/DL (ref 70–105)
GLUCOSE BLDC GLUCOMTR-MCNC: 210 MG/DL (ref 70–105)
GLUCOSE BLDC GLUCOMTR-MCNC: 213 MG/DL (ref 70–105)
GLUCOSE SERPL-MCNC: 166 MG/DL (ref 65–99)
HCT VFR BLD AUTO: 25.8 % (ref 37.5–51)
HGB BLD-MCNC: 8.5 G/DL (ref 13–17.7)
LYMPHOCYTES # BLD AUTO: 1.7 10*3/MM3 (ref 0.7–3.1)
LYMPHOCYTES NFR BLD AUTO: 18.6 % (ref 19.6–45.3)
MCH RBC QN AUTO: 28.2 PG (ref 26.6–33)
MCHC RBC AUTO-ENTMCNC: 32.9 G/DL (ref 31.5–35.7)
MCV RBC AUTO: 85.8 FL (ref 79–97)
MONOCYTES # BLD AUTO: 0.9 10*3/MM3 (ref 0.1–0.9)
MONOCYTES NFR BLD AUTO: 10.3 % (ref 5–12)
NEUTROPHILS NFR BLD AUTO: 5.7 10*3/MM3 (ref 1.7–7)
NEUTROPHILS NFR BLD AUTO: 63.3 % (ref 42.7–76)
NRBC BLD AUTO-RTO: 0.1 /100 WBC (ref 0–0.2)
PHOSPHATE SERPL-MCNC: 6.3 MG/DL (ref 2.5–4.5)
PLATELET # BLD AUTO: 446 10*3/MM3 (ref 140–450)
PMV BLD AUTO: 7.4 FL (ref 6–12)
POTASSIUM SERPL-SCNC: 5.3 MMOL/L (ref 3.5–5.2)
RBC # BLD AUTO: 3.01 10*6/MM3 (ref 4.14–5.8)
SODIUM SERPL-SCNC: 130 MMOL/L (ref 136–145)
WBC # BLD AUTO: 9 10*3/MM3 (ref 3.4–10.8)

## 2021-08-23 PROCEDURE — 0JH63XZ INSERTION OF TUNNELED VASCULAR ACCESS DEVICE INTO CHEST SUBCUTANEOUS TISSUE AND FASCIA, PERCUTANEOUS APPROACH: ICD-10-PCS | Performed by: INTERNAL MEDICINE

## 2021-08-23 PROCEDURE — 99232 SBSQ HOSP IP/OBS MODERATE 35: CPT | Performed by: INTERNAL MEDICINE

## 2021-08-23 PROCEDURE — 85025 COMPLETE CBC W/AUTO DIFF WBC: CPT | Performed by: INTERNAL MEDICINE

## 2021-08-23 PROCEDURE — C1750 CATH, HEMODIALYSIS,LONG-TERM: HCPCS

## 2021-08-23 PROCEDURE — 94799 UNLISTED PULMONARY SVC/PX: CPT

## 2021-08-23 PROCEDURE — 25010000002 MIDAZOLAM PER 1 MG: Performed by: RADIOLOGY

## 2021-08-23 PROCEDURE — 80069 RENAL FUNCTION PANEL: CPT | Performed by: INTERNAL MEDICINE

## 2021-08-23 PROCEDURE — 25010000002 ONDANSETRON PER 1 MG: Performed by: RADIOLOGY

## 2021-08-23 PROCEDURE — 25010000002 HEPARIN (PORCINE) PER 1000 UNITS: Performed by: INTERNAL MEDICINE

## 2021-08-23 PROCEDURE — 25010000002 FENTANYL CITRATE (PF) 50 MCG/ML SOLUTION: Performed by: RADIOLOGY

## 2021-08-23 PROCEDURE — 63710000001 INSULIN LISPRO (HUMAN) PER 5 UNITS: Performed by: HOSPITALIST

## 2021-08-23 PROCEDURE — 77001 FLUOROGUIDE FOR VEIN DEVICE: CPT

## 2021-08-23 PROCEDURE — 82962 GLUCOSE BLOOD TEST: CPT

## 2021-08-23 PROCEDURE — C1894 INTRO/SHEATH, NON-LASER: HCPCS

## 2021-08-23 PROCEDURE — 5A1D70Z PERFORMANCE OF URINARY FILTRATION, INTERMITTENT, LESS THAN 6 HOURS PER DAY: ICD-10-PCS | Performed by: INTERNAL MEDICINE

## 2021-08-23 PROCEDURE — 36558 INSERT TUNNELED CV CATH: CPT

## 2021-08-23 PROCEDURE — 76937 US GUIDE VASCULAR ACCESS: CPT

## 2021-08-23 PROCEDURE — 25010000002 HEPARIN (PORCINE) PER 1000 UNITS: Performed by: RADIOLOGY

## 2021-08-23 PROCEDURE — 05HM33Z INSERTION OF INFUSION DEVICE INTO RIGHT INTERNAL JUGULAR VEIN, PERCUTANEOUS APPROACH: ICD-10-PCS | Performed by: INTERNAL MEDICINE

## 2021-08-23 PROCEDURE — 99152 MOD SED SAME PHYS/QHP 5/>YRS: CPT

## 2021-08-23 RX ORDER — MIDAZOLAM HYDROCHLORIDE 1 MG/ML
INJECTION INTRAMUSCULAR; INTRAVENOUS
Status: COMPLETED | OUTPATIENT
Start: 2021-08-23 | End: 2021-08-23

## 2021-08-23 RX ORDER — HEPARIN SODIUM 1000 [USP'U]/ML
INJECTION, SOLUTION INTRAVENOUS; SUBCUTANEOUS
Status: COMPLETED | OUTPATIENT
Start: 2021-08-23 | End: 2021-08-23

## 2021-08-23 RX ORDER — FENTANYL CITRATE 50 UG/ML
INJECTION, SOLUTION INTRAMUSCULAR; INTRAVENOUS
Status: COMPLETED | OUTPATIENT
Start: 2021-08-23 | End: 2021-08-23

## 2021-08-23 RX ORDER — CALCIUM ACETATE 667 MG/1
667 CAPSULE ORAL
Status: DISCONTINUED | OUTPATIENT
Start: 2021-08-23 | End: 2021-08-25

## 2021-08-23 RX ORDER — ONDANSETRON 2 MG/ML
INJECTION INTRAMUSCULAR; INTRAVENOUS
Status: COMPLETED | OUTPATIENT
Start: 2021-08-23 | End: 2021-08-23

## 2021-08-23 RX ORDER — ALBUMIN (HUMAN) 12.5 G/50ML
25 SOLUTION INTRAVENOUS AS NEEDED
Status: ACTIVE | OUTPATIENT
Start: 2021-08-23 | End: 2021-08-23

## 2021-08-23 RX ORDER — LIDOCAINE HYDROCHLORIDE 10 MG/ML
INJECTION, SOLUTION EPIDURAL; INFILTRATION; INTRACAUDAL; PERINEURAL
Status: COMPLETED | OUTPATIENT
Start: 2021-08-23 | End: 2021-08-23

## 2021-08-23 RX ADMIN — INSULIN LISPRO 8 UNITS: 100 INJECTION, SOLUTION INTRAVENOUS; SUBCUTANEOUS at 19:02

## 2021-08-23 RX ADMIN — CALCIUM ACETATE 667 MG: 667 CAPSULE ORAL at 19:03

## 2021-08-23 RX ADMIN — AMIODARONE HYDROCHLORIDE 200 MG: 200 TABLET ORAL at 21:20

## 2021-08-23 RX ADMIN — HEPARIN SODIUM 4000 UNITS: 1000 INJECTION INTRAVENOUS; SUBCUTANEOUS at 12:24

## 2021-08-23 RX ADMIN — DONEPEZIL HYDROCHLORIDE 10 MG: 5 TABLET, FILM COATED ORAL at 21:20

## 2021-08-23 RX ADMIN — BUDESONIDE 0.5 MG: 0.5 SUSPENSION RESPIRATORY (INHALATION) at 06:59

## 2021-08-23 RX ADMIN — CYANOCOBALAMIN TAB 1000 MCG 1000 MCG: 1000 TAB at 15:49

## 2021-08-23 RX ADMIN — HEPARIN SODIUM 4100 UNITS: 1000 INJECTION, SOLUTION INTRAVENOUS; SUBCUTANEOUS at 14:59

## 2021-08-23 RX ADMIN — ONDANSETRON 4 MG: 2 INJECTION INTRAMUSCULAR; INTRAVENOUS at 14:32

## 2021-08-23 RX ADMIN — MIDAZOLAM 0.5 MG: 1 INJECTION INTRAMUSCULAR; INTRAVENOUS at 14:42

## 2021-08-23 RX ADMIN — DILTIAZEM HYDROCHLORIDE 240 MG: 240 CAPSULE, COATED, EXTENDED RELEASE ORAL at 15:49

## 2021-08-23 RX ADMIN — Medication 10 ML: at 21:24

## 2021-08-23 RX ADMIN — DULOXETINE 60 MG: 30 CAPSULE, DELAYED RELEASE ORAL at 15:49

## 2021-08-23 RX ADMIN — IPRATROPIUM BROMIDE AND ALBUTEROL SULFATE 3 ML: 2.5; .5 SOLUTION RESPIRATORY (INHALATION) at 22:31

## 2021-08-23 RX ADMIN — DOCUSATE SODIUM 50 MG AND SENNOSIDES 8.6 MG 2 TABLET: 8.6; 5 TABLET, FILM COATED ORAL at 21:20

## 2021-08-23 RX ADMIN — CEFAZOLIN SODIUM 2 G: 1 INJECTION, POWDER, FOR SOLUTION INTRAMUSCULAR; INTRAVENOUS at 14:15

## 2021-08-23 RX ADMIN — Medication 1000 UNITS: at 15:49

## 2021-08-23 RX ADMIN — IPRATROPIUM BROMIDE AND ALBUTEROL SULFATE 3 ML: 2.5; .5 SOLUTION RESPIRATORY (INHALATION) at 18:43

## 2021-08-23 RX ADMIN — IPRATROPIUM BROMIDE AND ALBUTEROL SULFATE 3 ML: 2.5; .5 SOLUTION RESPIRATORY (INHALATION) at 06:58

## 2021-08-23 RX ADMIN — Medication 10 ML: at 09:00

## 2021-08-23 RX ADMIN — FENTANYL CITRATE 50 MCG: 50 INJECTION, SOLUTION INTRAMUSCULAR; INTRAVENOUS at 14:41

## 2021-08-23 RX ADMIN — FENTANYL CITRATE 50 MCG: 50 INJECTION, SOLUTION INTRAMUSCULAR; INTRAVENOUS at 14:36

## 2021-08-23 RX ADMIN — BUDESONIDE 0.5 MG: 0.5 SUSPENSION RESPIRATORY (INHALATION) at 18:43

## 2021-08-23 RX ADMIN — MIDAZOLAM 0.5 MG: 1 INJECTION INTRAMUSCULAR; INTRAVENOUS at 14:36

## 2021-08-23 RX ADMIN — ASPIRIN 81 MG: 81 TABLET, CHEWABLE ORAL at 15:49

## 2021-08-23 RX ADMIN — POLYETHYLENE GLYCOL 3350 17 G: 17 POWDER, FOR SOLUTION ORAL at 15:49

## 2021-08-23 RX ADMIN — ISOSORBIDE MONONITRATE 60 MG: 60 TABLET, EXTENDED RELEASE ORAL at 19:03

## 2021-08-23 RX ADMIN — LIDOCAINE HYDROCHLORIDE 5 ML: 10 INJECTION, SOLUTION EPIDURAL; INFILTRATION; INTRACAUDAL; PERINEURAL at 14:42

## 2021-08-23 RX ADMIN — INSULIN LISPRO 8 UNITS: 100 INJECTION, SOLUTION INTRAVENOUS; SUBCUTANEOUS at 21:20

## 2021-08-23 RX ADMIN — ATORVASTATIN CALCIUM 40 MG: 40 TABLET, FILM COATED ORAL at 21:20

## 2021-08-23 NOTE — PLAN OF CARE
Problem: Arrhythmia/Dysrhythmia  Goal: Normalized Cardiac Rhythm  Outcome: Ongoing, Progressing     Problem: Fall Injury Risk  Goal: Absence of Fall and Fall-Related Injury  Outcome: Ongoing, Progressing  Intervention: Identify and Manage Contributors to Fall Injury Risk  Recent Flowsheet Documentation  Taken 8/22/2021 2000 by Guy Castellanos, RN  Medication Review/Management: medications reviewed  Intervention: Promote Injury-Free Environment  Recent Flowsheet Documentation  Taken 8/23/2021 0400 by Guy Castellanos, RN  Safety Promotion/Fall Prevention:   safety round/check completed   room organization consistent   nonskid shoes/slippers when out of bed   lighting adjusted   fall prevention program maintained   clutter free environment maintained   assistive device/personal items within reach  Taken 8/23/2021 0200 by Guy Castellanos, RN  Safety Promotion/Fall Prevention:   safety round/check completed   room organization consistent   nonskid shoes/slippers when out of bed   lighting adjusted   fall prevention program maintained   clutter free environment maintained   assistive device/personal items within reach  Taken 8/23/2021 0000 by Guy Castellanos, RN  Safety Promotion/Fall Prevention:   safety round/check completed   room organization consistent   nonskid shoes/slippers when out of bed   lighting adjusted   fall prevention program maintained   clutter free environment maintained   assistive device/personal items within reach  Taken 8/22/2021 2000 by Guy Castellanos, RN  Safety Promotion/Fall Prevention:   safety round/check completed   room organization consistent   nonskid shoes/slippers when out of bed   lighting adjusted   fall prevention program maintained   clutter free environment maintained   assistive device/personal items within reach     Problem: Adult Inpatient Plan of Care  Goal: Plan of Care Review  Outcome: Ongoing, Progressing  Flowsheets (Taken 8/23/2021 0443)  Progress:  improving  Plan of Care Reviewed With: patient  Goal: Patient-Specific Goal (Individualized)  Outcome: Ongoing, Progressing  Goal: Absence of Hospital-Acquired Illness or Injury  Outcome: Ongoing, Progressing  Intervention: Identify and Manage Fall Risk  Recent Flowsheet Documentation  Taken 8/23/2021 0400 by Guy Castellanos RN  Safety Promotion/Fall Prevention:   safety round/check completed   room organization consistent   nonskid shoes/slippers when out of bed   lighting adjusted   fall prevention program maintained   clutter free environment maintained   assistive device/personal items within reach  Taken 8/23/2021 0200 by Guy Castellanos RN  Safety Promotion/Fall Prevention:   safety round/check completed   room organization consistent   nonskid shoes/slippers when out of bed   lighting adjusted   fall prevention program maintained   clutter free environment maintained   assistive device/personal items within reach  Taken 8/23/2021 0000 by Guy Castellanos RN  Safety Promotion/Fall Prevention:   safety round/check completed   room organization consistent   nonskid shoes/slippers when out of bed   lighting adjusted   fall prevention program maintained   clutter free environment maintained   assistive device/personal items within reach  Taken 8/22/2021 2000 by Guy Castellanos RN  Safety Promotion/Fall Prevention:   safety round/check completed   room organization consistent   nonskid shoes/slippers when out of bed   lighting adjusted   fall prevention program maintained   clutter free environment maintained   assistive device/personal items within reach  Intervention: Prevent Skin Injury  Recent Flowsheet Documentation  Taken 8/23/2021 0400 by Guy Castellanos, RN  Skin Protection:   adhesive use limited   incontinence pads utilized  Taken 8/22/2021 2000 by Guy Castellanos RN  Skin Protection:   adhesive use limited   incontinence pads utilized  Intervention: Prevent Infection  Recent Flowsheet  Documentation  Taken 8/23/2021 0400 by Guy Castellanos RN  Infection Prevention:   personal protective equipment utilized   visitors restricted/screened  Taken 8/23/2021 0200 by Guy Castellanos RN  Infection Prevention:   personal protective equipment utilized   visitors restricted/screened  Taken 8/23/2021 0000 by Guy Castellanos RN  Infection Prevention:   personal protective equipment utilized   visitors restricted/screened  Taken 8/22/2021 2000 by Guy Castellanos RN  Infection Prevention:   personal protective equipment utilized   visitors restricted/screened  Goal: Optimal Comfort and Wellbeing  Outcome: Ongoing, Progressing  Intervention: Provide Person-Centered Care  Recent Flowsheet Documentation  Taken 8/23/2021 0400 by Guy Castellanos RN  Trust Relationship/Rapport: care explained  Taken 8/23/2021 0000 by Guy Castellanos RN  Trust Relationship/Rapport: care explained  Taken 8/22/2021 2000 by Guy Castellanos RN  Trust Relationship/Rapport:   care explained   choices provided   thoughts/feelings acknowledged  Goal: Readiness for Transition of Care  Outcome: Ongoing, Progressing     Problem: Skin Injury Risk Increased  Goal: Skin Health and Integrity  Outcome: Ongoing, Progressing  Intervention: Optimize Skin Protection  Recent Flowsheet Documentation  Taken 8/23/2021 0400 by Guy Castellanos RN  Pressure Reduction Techniques: weight shift assistance provided  Pressure Reduction Devices: pressure-redistributing mattress utilized  Skin Protection:   adhesive use limited   incontinence pads utilized  Taken 8/23/2021 0000 by Guy Castellanos RN  Pressure Reduction Techniques: weight shift assistance provided  Pressure Reduction Devices: pressure-redistributing mattress utilized  Taken 8/22/2021 2000 by Guy Castellanos RN  Pressure Reduction Techniques: weight shift assistance provided  Pressure Reduction Devices: pressure-redistributing mattress utilized  Skin Protection:   adhesive  use limited   incontinence pads utilized     Problem: Breathing Pattern Ineffective  Goal: Effective Breathing Pattern  Outcome: Ongoing, Progressing  Intervention: Promote Improved Breathing Pattern  Recent Flowsheet Documentation  Taken 8/23/2021 0000 by Guy Castellanos RN  Supportive Measures: active listening utilized     Problem: Noninvasive Ventilation Acute  Goal: Effective Unassisted Ventilation and Oxygenation  Outcome: Ongoing, Progressing     Problem: Fluid Volume Excess  Goal: Fluid Balance  Outcome: Ongoing, Progressing  Intervention: Monitor and Manage Hypervolemia  Recent Flowsheet Documentation  Taken 8/23/2021 0400 by Guy Castellanos, RN  Skin Protection:   adhesive use limited   incontinence pads utilized  Taken 8/22/2021 2000 by Guy Castellanos, RN  Skin Protection:   adhesive use limited   incontinence pads utilized   Goal Outcome Evaluation:  Plan of Care Reviewed With: patient        Progress: improving

## 2021-08-23 NOTE — PROGRESS NOTES
Referring Provider: Du Zamorano *    Reason for follow-up:  Atrial fibrillation  Cardiomyopathy  Shortness of breath  Bradycardia  Respiratory insufficiency  ESRD      Patient Care Team:  Samantha Zabala APRN as PCP - General  Samantha Zabala APRN as PCP - Family Medicine  Jose G Rm MD as Consulting Physician (Cardiology)    Subjective .  Feeling much better after dialysis was started.  Looking better.    ROS  Since I have last seen, the patient has been without any chest discomfort ,shortness of breath, palpitations, dizziness or syncope.  Denies having any headache ,abdominal pain ,nausea, vomiting , diarrhea constipation, loss of weight or loss of appetite.  Denies having any excessive bruising ,hematuria or blood in the stool.    Review of all systems negative except as indicated.    Reviewed ROS.  History  Past Medical History:   Diagnosis Date   • A-fib (CMS/[facility]) 8/3/2021   • Appetite loss    • Arthritis    • Brain bleed (CMS/[facility])    • Carpal tunnel syndrome on left    • CHF (congestive heart failure) (CMS/[facility])    • Chronic left-sided low back pain without sciatica 12/27/2017   • CKD (chronic kidney disease) stage 3, GFR 30-59 ml/min (CMS/[facility])    • Closed fracture of seventh thoracic vertebra (CMS/[facility]) 8/23/2020   • Cognitive communication deficit 12/1/2020   • COVID-19 2/19/2021   • Cytokine release syndrome, grade 1 5/24/2021   • Depression    • Diabetic neuropathy (CMS/[facility])    • DM2 (diabetes mellitus, type 2) (CMS/[facility])    • Hyperlipidemia    • Hypogonadism in male    • Nonsustained ventricular tachycardia (CMS/[facility]) 7/23/2021   • Obesity    • Paroxysmal atrial fibrillation (CMS/HCC) 12/1/2020   • Sleep apnea    • Stroke (CMS/[facility])    • Unsteady gait        Past Surgical History:   Procedure Laterality Date   • ANGIOPLASTY      X2   • APPENDECTOMY     • CARDIAC CATHETERIZATION  11/13/2015   • CARDIAC CATHETERIZATION N/A 5/24/2021    Procedure: Left Heart Cath and coronary angiogram;   Surgeon: Jose G Rm MD;  Location:  [facility] C[facility]VASIVE [facility];  Service: Cardiovascular;  Laterality: N/A;   • CARDIAC CATHETERIZATION  5/24/2021    Procedure: Saphenous Vein Graft;  Surgeon: Joes G Rm MD;  Location:  [facility];  Service: Cardiovascular;;   • CARDIAC CATHETERIZATION N/A 5/24/2021    Procedure: Percutaneous Coronary Intervention;  Surgeon: Bernabe Zambrano MD;  Location:  [facility];  Service: Cardiology;  Laterality: N/A;   • CARDIAC CATHETERIZATION N/A 5/24/2021    Procedure: Stent NIELS coronary;  Surgeon: Bernabe Zambrano MD;  Location:  [facility];  Service: Cardiology;  Laterality: N/A;   • CARDIAC CATHETERIZATION N/A 5/26/2021    Procedure: Percutaneous Coronary Intervention;  Surgeon: Bernabe Zambrano MD;  Location:  [facility];  Service: Cardiovascular;  Laterality: N/A;   • CARDIAC CATHETERIZATION N/A 5/26/2021    Procedure: Stent NIELS bypass graft;  Surgeon: Bernabe Zambrano MD;  Location: [facility] CATH INVASIVE LOCATION;  Service: Cardiovascular;  Laterality: N/A;   • CARDIAC ELECTROPHYSIOLOGY PROCEDURE N/A 5/24/2021    Procedure: Temporary Pacemaker;  Surgeon: Bernabe Zambrano MD;  Location:  [facility]VAS[facility];  Service: Cardiology;  Laterality: N/A;   • CARDIAC ELECTROPHYSIOLOGY PROCEDURE N/A 5/26/2021    Procedure: Temporary Pacemaker;  Surgeon: Bernabe Zambrano MD;  Location: [facility] CATH INVASIVE LOCATION;  Service: Cardiovascular;  Laterality: N/A;   • CARPAL TUNNEL RELEASE Left 04/29/2018    carpal tunnel- lt hand// other hand surgeries    • CATARACT EXTRACTION, BILATERAL  2002    Dr. Lux Acosta   • CHOLECYSTECTOMY     • COLON RESECTION  2005   • CORONARY ANGIOPLASTY     • CORONARY ANGIOPLASTY WITH STENT PLACEMENT  11/13/2015    PTCA stent to proximal in stent and mid to distal lad   • CORONARY ANGIOPLASTY WITH STENT PLACEMENT  09/16/2016    PTCA stent to mid lad and stent to vein graft to marginal    • CORONARY ARTERY BYPASS GRAFT  2005    @ [facility]   • CYST REMOVAL      cyst removed from scrotum   • FOOT SURGERY Right 07/17/2018   • FOOT SURGERY Left 02/04/2019   • TOE AMPUTATION Right     right toe removed D/T infected cut that went to the bone       Family History   Problem Relation Age of Onset   • Heart disease Mother    • Heart disease Father    • Diabetes Sister    • Heart disease Sister    • Diabetes Brother    • Mental illness Brother        Social History     Tobacco Use   • Smoking status: Former Smoker     Packs/day: 1.00     Years: 60.00     Pack years: 60.00     Types: Cigarettes   • Smokeless tobacco: Never Used   • Tobacco comment: Patient quit smoking 11/2020   Vaping Use   • Vaping Use: Never used   Substance Use Topics   • Alcohol use: No   • Drug use: Yes     Frequency: 1.0 times per week     Types: Marijuana     Comment: socially        Medications Prior to Admission   Medication Sig Dispense Refill Last Dose   • albuterol sulfate  (90 Base) MCG/ACT inhaler Inhale 2 puffs Every 4 (Four) Hours As Needed.      • apixaban (ELIQUIS) 5 MG tablet tablet Take 5 mg by mouth Daily.      • aspirin 81 MG chewable tablet Chew 81 mg Daily.      • atorvastatin (LIPITOR) 40 MG tablet Take 40 mg by mouth Every Night.      • cholecalciferol (VITAMIN D3) 25 MCG (1000 UT) tablet Take 1,000 Units by mouth Daily.      • dextrose (GLUTOSE) 40 % gel Take  by mouth Every 1 (One) Hour As Needed for Low Blood Sugar.      • donepezil (ARICEPT) 10 MG tablet Take 10 mg by mouth Every Night.      • DULoxetine HCl (DRIZALMA) 60 MG capsule Take 60 mg by mouth Daily.      • gabapentin (NEURONTIN) 100 MG capsule Take 2 capsules by mouth 2 (two) times a day. 6 capsule 0    • hydrALAZINE (APRESOLINE) 50 MG tablet Take 50 mg by mouth 2 (two) times a day. Hold for SBP <110      • isosorbide mononitrate (IMDUR) 60 MG 24 hr tablet Take 60 mg by mouth 2 (Two) Times a Day.      • metoprolol succinate XL (TOPROL-XL) 100 MG 24 hr  tablet Take 1 tablet by mouth Daily for 30 days. 30 tablet 0    • nitroglycerin (NITROSTAT) 0.4 MG SL tablet Place 1 tablet under the tongue Every 5 (Five) Minutes As Needed for Chest Pain (Only if SBP Greater Than 100). Take no more than 3 doses in 15 minutes. 30 tablet 12    • spironolactone (ALDACTONE) 25 MG tablet Take 1 tablet by mouth Daily for 30 days. 30 tablet 0    • tiotropium (Spiriva HandiHaler) 18 MCG per inhalation capsule Place 1 capsule into inhaler and inhale Daily. 30 capsule 1    • vitamin B-12 (CYANOCOBALAMIN) 1000 MCG tablet Take 1,000 mcg by mouth Daily.      • [DISCONTINUED] furosemide (LASIX) 40 MG tablet Take 1 tablet by mouth 2 (Two) Times a Day for 30 days. 60 tablet 0        Allergies  Codeine    Scheduled Meds:amiodarone, 200 mg, Oral, Q12H  aspirin, 81 mg, Oral, Daily  atorvastatin, 40 mg, Oral, Nightly  budesonide, 0.5 mg, Nebulization, BID - RT  carvedilol, 12.5 mg, Oral, Q12H  cholecalciferol, 1,000 Units, Oral, Daily  dilTIAZem CD, 240 mg, Oral, Q24H  donepezil, 10 mg, Oral, Nightly  DULoxetine, 60 mg, Oral, Daily  epoetin izabella-epbx, 20,000 Units, Subcutaneous, Weekly  insulin lispro, 0-24 Units, Subcutaneous, 4x Daily With Meals & Nightly  ipratropium-albuterol, 3 mL, Nebulization, Q4H - RT  isosorbide mononitrate, 60 mg, Oral, BID - Nitrates  polyethylene glycol, 17 g, Oral, Daily  senna-docusate sodium, 2 tablet, Oral, BID  sodium chloride, 10 mL, Intravenous, Q12H  vitamin B-12, 1,000 mcg, Oral, Daily      Continuous Infusions:hold, 1 each      PRN Meds:.•  acetaminophen **OR** acetaminophen **OR** acetaminophen  •  aluminum-magnesium hydroxide-simethicone  •  dextrose  •  dextrose  •  glucagon (human recombinant)  •  heparin (porcine)  •  hold  •  insulin lispro **AND** insulin lispro  •  melatonin  •  nitroglycerin  •  ondansetron **OR** ondansetron  •  [COMPLETED] Insert peripheral IV **AND** sodium chloride  •  sodium chloride    Objective     VITAL SIGNS  Vitals:     "08/22/21 1914 08/22/21 1927 08/22/21 2236 08/23/21 0224   BP:   121/53 110/46   BP Location:   Left arm Left arm   Patient Position:   Lying Lying   Pulse: 58 57 68 57   Resp: 16  18 13   Temp:   98.1 °F (36.7 °C) 97.9 °F (36.6 °C)   TempSrc:   Oral Oral   SpO2: 90% 99% 91% 95%   Weight:       Height:           Flowsheet Rows      First Filed Value   Admission Height  180.3 cm (71\") Documented at 08/03/2021 1733   Admission Weight  113 kg (250 lb) Documented at 08/03/2021 1733            Intake/Output Summary (Last 24 hours) at 8/23/2021 0641  Last data filed at 8/22/2021 1500  Gross per 24 hour   Intake 350 ml   Output 200 ml   Net 150 ml        TELEMETRY: Atrial fibrillation with controlled ventricular response.    Physical Exam:  The patient is awake and alert and not in distress.  Vital signs as noted above.  Exogenous obesity.  Head and neck revealed no carotid bruits or jugular venous distention.  No thyromegaly or lymphadenopathy is present  Lungs clear.  No wheezing.  Breath sounds are normal bilaterally.  Heart normal first and second heart sounds.  No murmur. No precordial rub is present.  No gallop is present.  Abdomen soft and nontender.  No organomegaly is present.  Extremities with good peripheral pulses without any pedal edema.  Skin warm and dry.  Musculoskeletal system is grossly normal  CNS-grossly normal.  Results Review:   I reviewed the patient's new clinical results.  Lab Results (last 24 hours)     Procedure Component Value Units Date/Time    Renal Function Panel [[id number]]  (Abnormal) Collected: 08/23/21 0544    Specimen: Blood Updated: 08/23/21 0638     Glucose 166 mg/dL      BUN 59 mg/dL      Creatinine 4.15 mg/dL      Sodium 130 mmol/L      Potassium 5.3 mmol/L      Chloride 92 mmol/L      CO2 26.0 mmol/L      Calcium 8.7 mg/dL      Albumin 3.50 g/dL      Phosphorus 6.3 mg/dL      Anion Gap 12.0 mmol/L      BUN/Creatinine Ratio 14.2     eGFR Non African Amer 14 mL/min/1.73      Comment: " <15 Indicative of kidney failure.        eGFR   Amer --     Comment: <15 Indicative of kidney failure.       Narrative:      GFR Normal >60  Chronic Kidney Disease <60  Kidney Failure <15      CBC & Differential [[id number]]  (Abnormal) Collected: 08/23/21 0544    Specimen: Blood Updated: 08/23/21 0612    Narrative:      The following orders were created for panel order CBC & Differential.  Procedure                               Abnormality         Status                     ---------                               -----------         ------                     CBC Auto Differential[[id number]]        Abnormal            Final result                 Please view results for these tests on the individual orders.    CBC Auto Differential [[id number]]  (Abnormal) Collected: 08/23/21 0544    Specimen: Blood Updated: 08/23/21 0612     WBC 9.00 10*3/mm3      RBC 3.01 10*6/mm3      Hemoglobin 8.5 g/dL      Hematocrit 25.8 %      MCV 85.8 fL      MCH 28.2 pg      MCHC 32.9 g/dL      RDW 16.4 %      RDW-SD 49.4 fl      MPV 7.4 fL      Platelets 446 10*3/mm3      Neutrophil % 63.3 %      Lymphocyte % 18.6 %      Monocyte % 10.3 %      Eosinophil % 6.5 %      Basophil % 1.3 %      Neutrophils, Absolute 5.70 10*3/mm3      Lymphocytes, Absolute 1.70 10*3/mm3      Monocytes, Absolute 0.90 10*3/mm3      Eosinophils, Absolute 0.60 10*3/mm3      Basophils, Absolute 0.10 10*3/mm3      nRBC 0.1 /100 WBC     POC Glucose Once [[id number]]  (Abnormal) Collected: 08/22/21 2041    Specimen: Blood Updated: 08/22/21 2042     Glucose 144 mg/dL      Comment: Serial Number: [id number]:  [id number]       POC Glucose Once [[id number]]  (Abnormal) Collected: 08/22/21 1729    Specimen: Blood Updated: 08/22/21 1730     Glucose 195 mg/dL      Comment: Serial Number: [id number]:  [id number]       POC Glucose Once [[id number]]  (Abnormal) Collected: 08/22/21 1111    Specimen: Blood Updated: 08/22/21 1112     Glucose 228 mg/dL      Comment:  Serial Number: [id number]:  [id number]             Imaging Results (Last 24 Hours)     ** No results found for the last 24 hours. **      LAB RESULTS (LAST 7 DAYS)    CBC  Results from last 7 days   Lab Units 08/23/21  0544 08/22/21  0524 08/21/21  0613 08/20/21 0227 08/19/21 0613 08/18/21  0607 08/17/21  0538   WBC 10*3/mm3 9.00 8.40 9.00 10.70 10.70 10.00 7.60   RBC 10*6/mm3 3.01* 3.02* 3.16* 3.24* 3.16* 3.00* 2.73*   HEMOGLOBIN g/dL 8.5* 8.7* 9.0* 9.2* 9.4* 8.6* 7.7*   HEMATOCRIT % 25.8* 26.0* 27.3* 28.0* 27.7* 26.0* 23.6*   MCV fL 85.8 86.0 86.4 86.4 87.6 86.5 86.3   PLATELETS 10*3/mm3 446 425 426 455* 429 383 314       BMP  Results from last 7 days   Lab Units 08/23/21  0544 08/22/21  0524 08/21/21 0613 08/20/21 0227 08/19/21 0613 08/18/21  0607 08/17/21  0538   SODIUM mmol/L 130* 132* 133* 133* 134* 137 134*   POTASSIUM mmol/L 5.3* 5.0 4.8 4.3 3.7 4.3 3.2*   CHLORIDE mmol/L 92* 93* 94* 93* 94* 97* 97*   CO2 mmol/L 26.0 27.0 29.0 29.0 31.0* 28.0 28.0   BUN mg/dL 59* 46* 29* 37* 23 49* 35*   CREATININE mg/dL 4.15* 3.37* 2.57* 2.52* 1.50* 2.48* 1.77*   GLUCOSE mg/dL 166* 186* 185* 181* 198* 149* 156*   MAGNESIUM mg/dL  --   --   --   --   --  1.9  --    PHOSPHORUS mg/dL 6.3* 6.2* 4.1 4.0 2.4* 5.3* 2.0*       CMP   Results from last 7 days   Lab Units 08/23/21  0544 08/22/21  0524 08/21/21  0613 08/20/21  0227 08/19/21  0613 08/18/21  0607 08/17/21  0538   SODIUM mmol/L 130* 132* 133* 133* 134* 137 134*   POTASSIUM mmol/L 5.3* 5.0 4.8 4.3 3.7 4.3 3.2*   CHLORIDE mmol/L 92* 93* 94* 93* 94* 97* 97*   CO2 mmol/L 26.0 27.0 29.0 29.0 31.0* 28.0 28.0   BUN mg/dL 59* 46* 29* 37* 23 49* 35*   CREATININE mg/dL 4.15* 3.37* 2.57* 2.52* 1.50* 2.48* 1.77*   GLUCOSE mg/dL 166* 186* 185* 181* 198* 149* 156*   ALBUMIN g/dL 3.50 3.60 3.70 3.70 3.90 3.40* 3.10*         BNP        TROPONIN        CoAg        Creatinine Clearance  Estimated Creatinine Clearance: 19 mL/min (A) (by C-G formula based on SCr of 4.15 mg/dL  (H)).    ABG        Radiology  No radiology results for the last day            EKG              I personally viewed and interpreted the patient's EKG/Telemetry data: Atrial fibrillation  ECHOCARDIOGRAM:    Results for orders placed during the hospital encounter of 07/20/21    Adult Transthoracic Echo Complete With Contrast if Necessary Per Protocol    Interpretation Summary  · Estimated left ventricular EF = 30% Left ventricular systolic function is moderately decreased.    Occasions  Shortness of breath.    Technically satisfactory study.  Mitral valve is structurally normal.  Mild mitral regurgitation.  Tricuspid valve is structurally normal.  Aortic valve is structurally normal.  Pulmonic valve could not be well visualized.  No evidence for tricuspid or aortic regurgitation is seen by Doppler study.  Biatrial and biventricular enlargement is present.  Diffuse left ventricular hypocontractility with ejection fraction of 30%.  Atrial septum is intact.  Aorta is normal.  No pericardial effusion or intracardiac thrombus is seen.    Impression  Mild mitral regurgitation.  Significant biatrial and biventricular enlargement.  Diffuse left ventricular hypocontractility with ejection fraction of 30%.  No pericardial effusion or intracardiac thrombus is seen.          STRESS MYOVIEW:    Cardiolite (Tc-99m Sestamibi) stress test    CARDIAC CATHETERIZATION:            OTHER:         Assessment/Plan     Principal Problem:    Acute on chronic systolic heart failure (CMS/HCC)  Active Problems:    S/P CABG (coronary artery bypass graft)    Essential hypertension    Elevated troponin    ANNETTE treated with BiPAP    Major depressive disorder, recurrent, mild (CMS/HCC)    Mixed hyperlipidemia    History of cerebrovascular accident    Flaccid hemiplegia of right dominant side as late effect of cerebral infarction (CMS/HCC)    Diabetic neuropathy (CMS/HCC)    Unspecified dementia with behavioral disturbance (CMS/HCC)    Coronary artery  disease    Obesity    Vitamin D deficiency    Chronic venous hypertension (idiopathic) with ulcer and inflammation of bilateral lower extremity (CMS/HCC)    Chronic obstructive pulmonary disease, unspecified (CMS/HCC)    Chronic foot ulcer (CMS/HCC)    Paroxysmal atrial fibrillation (CMS/HCC)    Acute kidney injury (CMS/HCC)    Type 2 diabetes mellitus with hyperglycemia (CMS/HCC)    CKD (chronic kidney disease) stage 3, GFR 30-59 ml/min (CMS/HCC)    Anemia of renal disease    Acute respiratory failure with hypoxia and hypercapnia (CMS/HCC)      [[[[[[[[[[[[[[[[[[[[[[[[[    Impression  ==============  -Increased shortness of breath and palpitations     -Recent acute on chronic congestive heart failure.  Compensated at this time  Recent echocardiogram showed left ventricle dysfunction with ejection fraction of 35%.     -History of right-sided weakness with left MCA territory stroke.-Improved      -status post CABG 2005. status post stent to LAD 2009    Status post stent to LAD 11/13/2015  Status post stent to mid LAD and SVG to marginal branch 09/16/2016.  Status post stent to mid LAD 5/24/2021  Status post stent to SVG to RCA 5/26/2021     Cardiac catheterization 5/24/2021 revealed  Left ventricle angiogram was not performed due to pre-existing renal dysfunction.  Left main coronary artery normal.  Left anterior descending artery has mid segment lengthy 90% disease within the previously placed stent.  Distal left into descending artery has diffuse disease.  Circumflex coronary artery is totally occluded.  (Chronic)  Right coronary artery is chronically occluded.  SVG to marginal branch (jump graft to OM1 and OM 2) is totally occluded (new finding compared to 2015.  SVG to PDA has distal radiolucency.  However no definite obstructive disease is present.       -status post myocardial infarction 2000 and 2002 .      -history of intermittent atrial fibrillation-maintaining sinus rhythm     Transesophageal echocardiogram  11/30/2020.  Biatrial enlargement.  Smoking effect in the left atrium and left ventricle and left atrial appendage.  Mild mitral and aortic regurgitation.  Left ventricle enlargement with diffuse hypocontractility with ejection fraction of 35 to 40%.     Echocardiogram 11/27/2020 revealed left atrial enlargement left ventricle dysfunction with ejection fraction of 40%.  Negative bubble study.      -diabetes hypertension and sleep apnea in history of renal dysfunction .  Recent BUN/creatinine 38/1.36     -status post colon surgery (partial colectomy) appendectomy cholecystectomy right 4th toe removal and carpal tunnel surgery      -continued smoking 1 pack per day -abstinence from smoking      -allergy to codeine.  ============   Plan  ================  Atrial fibrillation-chronic  Rate is better controlled  Patient is on Coreg at 12.5 mg p.o. twice a day.  Continue amiodarone and Cardizem CD.  Observe patient's blood pressure.    Respiratory insufficiency.-Improved    Significant renal dysfunction-chronic  Patient is on dialysis now.  Likely long-term  Patient is feeling much better after dialysis was started.  Patient to have permacath placement today.    Anemia  Hemoglobin 6.9.  Hemoglobin 7.7-8/17/2021  8.6-8/18/2021  8.5-8/23/2021    Status post CABG  Patient is not having any angina pectoris    Status post stent to LAD 5/24/2021.  Status post stent to SVG to RCA 5/26/2021.       Anticoagulation  Eliquis is on hold in anticipation of placing permacatheter for dialysis.  Eliquis needs to be restarted after completion of permacath placement when it is safe.    Further plan will depend on patient's progress.   ]]]]]]]]]]]]]]]]]]]]]]           Jose G Rm MD  08/23/21  06:41 EDT

## 2021-08-23 NOTE — NURSING NOTE
Pt full lift back to bed. HOB elevated 30 degree.  Pt taken back to IR holding to await transport.  Dressing dry and secure.

## 2021-08-23 NOTE — PROGRESS NOTES
Nutrition Services    Patient Name: Benson Mclean  YOB: 1950  MRN: [id number]  Admission date: 8/3/2021    Comments:     Continue Boost Glucose Control BID.    PPE Documentation        PPE Worn By Provider N/A, did enter pt room this date   PPE Worn By Patient  N/A     CLINICAL NUTRITION ASSESSMENT      Reason for Assessment 8/23/21: Follow up  8/16/21: full assessment s/p LOS short note   H&P:      Past Medical History:   Diagnosis Date   • A-fib (CMS/[facility]) 8/3/2021   • Appetite loss    • Arthritis    • Brain bleed (CMS/[facility])    • Carpal tunnel syndrome on left    • CHF (congestive heart failure) (CMS/[facility])    • Chronic left-sided low back pain without sciatica 12/27/2017   • CKD (chronic kidney disease) stage 3, GFR 30-59 ml/min (CMS/[facility])    • Closed fracture of seventh thoracic vertebra (CMS/[facility]) 8/23/2020   • Cognitive communication deficit 12/1/2020   • COVID-19 2/19/2021   • Cytokine release syndrome, grade 1 5/24/2021   • Depression    • Diabetic neuropathy (CMS/[facility])    • DM2 (diabetes mellitus, type 2) (CMS/[facility])    • Hyperlipidemia    • Hypogonadism in male    • Nonsustained ventricular tachycardia (CMS/[facility]) 7/23/2021   • Obesity    • Paroxysmal atrial fibrillation (CMS/[facility]) 12/1/2020   • Sleep apnea    • Stroke (CMS/[facility])    • Unsteady gait        Past Surgical History:   Procedure Laterality Date   • ANGIOPLASTY      X2   • APPENDECTOMY     • CARDIAC CATHETERIZATION  11/13/2015   • CARDIAC CATHETERIZATION N/A 5/24/2021    Procedure: Left Heart Cath and coronary angiogram;  Surgeon: Jose G Rm MD;  Location: [facility] CATH INVASIVE LOCATION;  Service: Cardiovascular;  Laterality: N/A;   • CARDIAC CATHETERIZATION  5/24/2021    Procedure: Saphenous Vein Graft;  Surgeon: Jose G Rm MD;  Location: [facility] CATH INVASIVE LOCATION;  Service: Cardiovascular;;   • CARDIAC CATHETERIZATION N/A 5/24/2021    Procedure: Percutaneous Coronary Intervention;  Surgeon: Bernabe Zambrano MD;  Location:   [facility] CATH INVASIVE LOCATION;  Service: Cardiology;  Laterality: N/A;   • CARDIAC CATHETERIZATION N/A 5/24/2021    Procedure: Stent NIELS coronary;  Surgeon: Bernabe Zambrano MD;  Location: [facility] CATH INVASIVE LOCATION;  Service: Cardiology;  Laterality: N/A;   • CARDIAC CATHETERIZATION N/A 5/26/2021    Procedure: Percutaneous Coronary Intervention;  Surgeon: Bernabe Zambrano MD;  Location: [facility] CATH INVASIVE LOCATION;  Service: Cardiovascular;  Laterality: N/A;   • CARDIAC CATHETERIZATION N/A 5/26/2021    Procedure: Stent NIELS bypass graft;  Surgeon: Bernabe Zambrano MD;  Location: [facility] CATH INVASIVE LOCATION;  Service: Cardiovascular;  Laterality: N/A;   • CARDIAC ELECTROPHYSIOLOGY PROCEDURE N/A 5/24/2021    Procedure: Temporary Pacemaker;  Surgeon: Bernabe Zambrano MD;  Location: [facility] CATH INVASIVE LOCATION;  Service: Cardiology;  Laterality: N/A;   • CARDIAC ELECTROPHYSIOLOGY PROCEDURE N/A 5/26/2021    Procedure: Temporary Pacemaker;  Surgeon: Bernabe Zambrano MD;  Location: [facility] CATH INVASIVE LOCATION;  Service: Cardiovascular;  Laterality: N/A;   • CARPAL TUNNEL RELEASE Left 04/29/2018    carpal tunnel- lt hand// other hand surgeries    • CATARACT EXTRACTION, BILATERAL  2002    Dr. Lux Acosta   • CHOLECYSTECTOMY     • COLON RESECTION  2005   • CORONARY ANGIOPLASTY     • CORONARY ANGIOPLASTY WITH STENT PLACEMENT  11/13/2015    PTCA stent to proximal in stent and mid to distal lad   • CORONARY ANGIOPLASTY WITH STENT PLACEMENT  09/16/2016    PTCA stent to mid lad and stent to vein graft to marginal   • CORONARY ARTERY BYPASS GRAFT  2005    @ [facility]   • CYST REMOVAL      cyst removed from scrotum   • FOOT SURGERY Right 07/17/2018   • FOOT SURGERY Left 02/04/2019   • TOE AMPUTATION Right     right toe removed D/T infected cut that went to the bone        Current Problems:     Acute on chronic systolic heart failure   -dialysis/diuresis per nephrology  -cardiology following     Acute respiratory failure with hypoxia and  "hypercapnia   -secondary to fluid overload  -AVAPS PRN  -pulmonary following     Acute kidney injury, CKD (chronic kidney disease) stage 3, GFR 30-59 ml/min   -nephrology following  -Shiley inserted and hemodialysis initiated on 08/14/21  -HD planned for today (08/16/21)     Elevated troponin  -likely demand ischemia  -cardiology following     Paroxysmal atrial fibrillation  -rate controlled  -continue amiodarone, diltiazem CD, and Eliquis     Coronary artery disease, S/P CABG / Essential hypertension, chronic / Mixed hyperlipidemia     ANNETTE treated with BiPAP     Major depressive disorder, recurrent, mild  -on Cymbalta      History of cerebrovascular accident  Flaccid hemiplegia of right dominant side as late effect of cerebral infarction   -fall precautions     Type 2 diabetes mellitus with hyperglycemia complicated with Diabetic neuropathy   -A1c 7.4% on 07/21/21     Unspecified dementia with behavioral disturbance  -on donepezil      Obesity  -BMI 37.39     Vitamin D deficiency  -continue cholecalciferol      Chronic venous hypertension (idiopathic) with ulcer and inflammation of bilateral lower extremity, Chronic foot ulcer     Chronic obstructive pulmonary disease, unspecified, former smoker  -stable without exacerbation     Anemia of renal disease     Nutrition/Diet History         Narrative     8/23: Patient out of room at time of visit, chart reviewed. In IR for dialysis access placement.    8/16: Full assessment completed this date as short note only able to be completed at initial encounter. Pt reports he is sometimes hungry after meals - offered option of additional carbohydrate choice at each meal (75 g per meal), pt accepting of this. Pt reports he ate all of breakfast this AM, had not had ONS today. Encouraged to drink as snack. Denies N/V, chew/swallow difficulty.    Noted per chart review, RD visited on 8/13 for LOS, including this narrative:\" Visited patient in room for LOS. Patient eating well per " "I/O, pt states he is getting tired of the same meal options. Discussed other items patient could order as Ala cart[employer]. Patient's wife on speaker phone and able to speak with her as well. Reports prior po intake is variable. Sometimes doesn't eat well at the nursing home- has been offered supplements there. Will order Boost GC 1x daily at this time due to fluid restriction. Weight history discussed with wife, states patient has a history of fluid retention and weight loss in past was due to fluid loss.   Functional Status \"generalized weakness\"     Food Allergies NKFA     Anthropometrics        Current Height, Weight Height: 170.2 cm (67.01\")  Weight: 98.2 kg (216 lb 7.9 oz) (08/23/21 0649)        Admit Height, Weight     Flowsheet Rows      First Filed Value   Admission Height  180.3 cm (71\") Documented at 08/03/2021 1733   Admission Weight  113 kg (250 lb) Documented at 08/03/2021 1733             Ideal Body Weight (IBW) 148 lb   % Ideal Body Weight 161%       Usual Body Weight UTD   % Usual Body Weight UTD   Wt Change Observation Current Admission:  8/16: CBW down from admission but appears c/w fluid loss, negative 6 Liters from admit  8/23: Weight down, though noted patient has started on dialysis, possibly fluid related  PTA:  8/16: 8% decrease in 5  Months, lots of fluctuation in wt hx, RD previously s/w wife who reports this is fluid related   Weight Hx    Wt Readings from Last 30 Encounters:   08/23/21 0649 98.2 kg (216 lb 7.9 oz)   08/21/21 2338 104 kg (229 lb 2.3 oz)   08/21/21 0600 105 kg (231 lb 14.4 oz)   08/20/21 0612 106 kg (234 lb 2.1 oz)   08/18/21 0640 108 kg (238 lb 12.1 oz)   08/16/21 0441 108 kg (238 lb 12.1 oz)   08/14/21 0820 113 kg (248 lb 3.8 oz)   08/12/21 0555 115 kg (254 lb 10.1 oz)   08/11/21 0559 115 kg (253 lb 8.5 oz)   08/10/21 0618 115 kg (253 lb 15.5 oz)   08/09/21 0609 113 kg (249 lb 1.9 oz)   08/08/21 0626 115 kg (253 lb 1.4 oz)   08/07/21 0612 116 kg (256 lb 9.9 oz)   08/05/21 0551 " 115 kg (253 lb 4.9 oz)   08/04/21 0115 116 kg (256 lb 2.8 oz)   08/03/21 1733 113 kg (250 lb)   08/01/21 0546 125 kg (276 lb 0.3 oz)   07/31/21 0519 124 kg (274 lb 7.6 oz)   07/29/21 0500 118 kg (261 lb 3.9 oz)   07/28/21 0514 117 kg (258 lb 6.1 oz)   07/27/21 0533 117 kg (257 lb 0.9 oz)   07/26/21 1620 115 kg (253 lb 8.5 oz)   07/24/21 0422 115 kg (252 lb 8 oz)   07/23/21 0342 116 kg (254 lb 13.6 oz)   07/22/21 0328 116 kg (256 lb 9.9 oz)   07/21/21 1405 114 kg (252 lb)   07/21/21 0145 114 kg (252 lb 3.3 oz)   07/20/21 2059 114 kg (251 lb)   07/02/21 0300 114 kg (250 lb 10.6 oz)   07/01/21 0344 113 kg (249 lb 9 oz)   06/30/21 0351 113 kg (248 lb 14.4 oz)   06/29/21 0600 113 kg (248 lb 10.9 oz)   06/28/21 0340 114 kg (251 lb 5.2 oz)   06/27/21 0438 111 kg (245 lb 2.4 oz)   06/26/21 0442 116 kg (256 lb 9.9 oz)   06/26/21 0050 116 kg (255 lb 11.7 oz)   06/25/21 1926 113 kg (250 lb)   06/03/21 0500 119 kg (262 lb 12.6 oz)   06/02/21 0506 118 kg (260 lb 2.3 oz)   06/01/21 0528 121 kg (267 lb 13.7 oz)   05/31/21 0500 122 kg (269 lb 6.4 oz)   05/30/21 0500 121 kg (266 lb 8.6 oz)   05/29/21 0500 122 kg (268 lb 15.4 oz)   05/29/21 0305 122 kg (268 lb 15.4 oz)   05/28/21 0504 122 kg (268 lb 8.3 oz)   05/26/21 0449 118 kg (260 lb 12.9 oz)   05/24/21 0323 119 kg (262 lb 9.1 oz)   05/22/21 2100 125 kg (276 lb 3.2 oz)   05/21/21 0349 125 kg (276 lb 7.3 oz)   05/20/21 0439 127 kg (280 lb 6.8 oz)   05/19/21 0202 127 kg (281 lb 1.4 oz)   05/18/21 2219 122 kg (270 lb)   05/08/21 0511 116 kg (256 lb 13.4 oz)   05/07/21 0606 118 kg (261 lb)   05/07/21 0456 118 kg (261 lb 0.4 oz)   05/06/21 2304 118 kg (261 lb 0.4 oz)   05/06/21 1720 122 kg (270 lb)   03/16/21 1141 125 kg (276 lb)   11/30/20 0557 125 kg (276 lb 7.3 oz)   11/29/20 0615 126 kg (278 lb 7.1 oz)   11/28/20 0635 124 kg (273 lb 5.9 oz)   11/27/20 1611 120 kg (265 lb)   11/27/20 0801 120 kg (265 lb)   11/16/20 0918 120 kg (265 lb)   09/23/20 1047 128 kg (282 lb)   08/24/20  0617 128 kg (282 lb 3 oz)   08/23/20 1212 126 kg (276 lb 14.4 oz)   08/23/20 0932 120 kg (265 lb)   01/23/20 1100 129 kg (284 lb 8 oz)   08/07/19 1413 129 kg (284 lb)   07/26/19 1957 126 kg (278 lb 10.6 oz)   06/26/19 0832 130 kg (285 lb 9.6 oz)   06/22/19 2034 130 kg (287 lb)   06/05/19 1011 129 kg (285 lb)   05/15/19 0850 129 kg (285 lb)   04/24/19 0837 128 kg (283 lb)   04/03/19 0824 127 kg (280 lb)   03/20/19 1106 127 kg (279 lb)   03/06/19 1100 126 kg (278 lb)   02/20/19 0959 126 kg (278 lb)   01/28/19 0820 126 kg (278 lb)   01/15/19 0839 126 kg (278 lb)   12/26/18 1241 126 kg (278 lb)   12/11/18 0935 126 kg (278 lb)   11/29/18 1020 125 kg (275 lb)   11/13/18 1037 125 kg (275 lb)   11/08/18 1436 118 kg (260 lb)   10/22/18 0928 118 kg (260 lb)        BMI kg/m2 Body mass index is 33.9 kg/m².       Labs/Medications         Pertinent Labs    Results from last 7 days   Lab Units 08/23/21  0544 08/22/21  0524 08/21/21  0613   SODIUM mmol/L 130* 132* 133*   POTASSIUM mmol/L 5.3* 5.0 4.8   CHLORIDE mmol/L 92* 93* 94*   CO2 mmol/L 26.0 27.0 29.0   BUN mg/dL 59* 46* 29*   CREATININE mg/dL 4.15* 3.37* 2.57*   CALCIUM mg/dL 8.7 8.5* 8.6   GLUCOSE mg/dL 166* 186* 185*     Results from last 7 days   Lab Units 08/23/21  0544 08/19/21  0613 08/18/21  0607   MAGNESIUM mg/dL  --   --  1.9   PHOSPHORUS mg/dL 6.3*   < > 5.3*   HEMOGLOBIN g/dL 8.5*   < > 8.6*   HEMATOCRIT % 25.8*   < > 26.0*    < > = values in this interval not displayed.     COVID19   Date Value Ref Range Status   08/03/2021 Not Detected Not Detected - Ref. Range Final     Lab Results   Component Value Date    HGBA1C 7.4 (H) 07/21/2021         Pertinent Medications ASA, lipitor, bumex, vitamin D3, aricept, cymbalta, retacrit, admelog, zaroxolyn, vitamin B-12     Physical Findings        Overall Physical   Appearance, [facility] 8/23: Unable to observe in person this date    8/16: visually appears c/w last encounter    8/13: NFPE completed. Some muscle loss is noted to  BLE, though patient is mostly in his W/C.   --  Edema  1+ generalized edema  2+ BL ankles     Gastrointestinal BM documented on 8/21     Tubes N/A     Oral/Mouth Cavity Teeth missing     Skin No current wounds documented     --  Current Nutrition Orders & Evaluation of Intake       Oral Nutrition     Current PO Diet Diet Regular   Supplement Boost Glucose Control BID   PO Evaluation     % PO Intake 8/16: Average meal intakes of 67% x26 meals documented this admit    8/23 Average 75% last 6 meals   --  Clinical Course    Nutrition Course Details PO Diet:  8/4 NPO  8/4-8/12 CCHO, HH  8[facility]13 [facility]  8/22 Regular       Nutritional Risk Screening        NRS-2002 Score   -       Nutrition Diagnosis         Nutrition Dx Problem 1 Inadequate oral intake r/t decreased ability to consume sufficient energy AEB average meal intakes of 67% per last assess with current 75% (though full meals sometimes not being selected or missed).       Nutrition Dx Problem 2 -       Intervention Goal         Intervention Goal(s) PO intakes increase >75% of meals    Accepting of ONS     Nutrition Intervention        RD/Tech Action Continue Boost Glucose Control qday       Nutrition Prescription          Diet Prescription Regluar   Supplement Prescription Boost Glucose control qAM   --  Monitor/Evaluation        Monitor PO intakes, BMs, N/V, abd pain/distention, labs (electrolytes, BUN/Crt, glucose), wt for changes, skin integrity, at next encounter.       Electronically signed by:  Tonia Wheeler RD  08/23/21 16:41 EDT

## 2021-08-23 NOTE — PROGRESS NOTES
Daily Progress Note        Acute on chronic systolic heart failure (CMS/[facility])    S/P CABG (coronary artery bypass graft)    Essential hypertension    Elevated troponin    ANNETTE treated with BiPAP    Major depressive disorder, recurrent, mild (CMS/[facility])    Mixed hyperlipidemia    History of cerebrovascular accident    Flaccid hemiplegia of right dominant side as late effect of cerebral infarction (CMS/[facility])    Diabetic neuropathy (CMS/[facility])    Unspecified dementia with behavioral disturbance (CMS/[facility])    Coronary artery disease    Obesity    Vitamin D deficiency    Chronic venous hypertension (idiopathic) with ulcer and inflammation of bilateral lower extremity (CMS/[facility])    Chronic obstructive pulmonary disease, unspecified (CMS/[facility])    Chronic foot ulcer (CMS/[facility])    Paroxysmal atrial fibrillation (CMS/[facility])    Acute kidney injury (CMS/[facility])    Type 2 diabetes mellitus with hyperglycemia (CMS/[facility])    CKD (chronic kidney disease) stage 3, GFR 30-59 ml/min (CMS/[facility])    Anemia of renal disease    Acute respiratory failure with hypoxia and hypercapnia (CMS/[facility])      Assessment    Pneumonia  COPD  Bilateral pleural effusion  ANNETTE uses BiPAP    Atrial fibrillation with RVR   Acute on chronic systolic heart failure  CAD S/P CABG   HTN  Elevated troponin  Hyperlipidemia  Flaccid hemiplegia of right dominant side as late effect of cerebral infarction   DM II with neuropathy   Dementia with behavioral disturbance  Chronic venous hypertension (idiopathic) with ulcer/inflammation of BLE  Chronic foot ulcer   CKD   Tobacco use disorder   Anemia      ECHO 7/21/21  EF 30%  -Cardiology following    Plan    Encourage OOB daily   Titrate oxygen to keep sat > 88%  Diuretic-Bumex 2mg BID per Nephrology  Bronchodilator/Inhaled corticosteroids  Glycemic control  DVT prophylaxis  -Lovenox on hold permanent HD cath placement Monday         LOS: 20 days     Subjective         Objective     Vital signs for last 24 hours:  Vitals:    08/23/21 0224  08/23/21 0649 08/23/21 0658 08/23/21 0702   BP: 110/46 118/52     BP Location: Left arm Left arm     Patient Position: Lying Lying     Pulse: 57 69 70 73   Resp: 13 13 16 16   Temp: 97.9 °F (36.6 °C) 97.7 °F (36.5 °C)     TempSrc: Oral Oral     SpO2: 95% 91% 95%    Weight:  98.2 kg (216 lb 7.9 oz)     Height:           Intake/Output last 3 shifts:  I/O last 3 completed shifts:  In: 350 [P.O.:350]  Out: 200 [Urine:200]  Intake/Output this shift:  No intake/output data recorded.      Radiology  Imaging Results (Last 24 Hours)     ** No results found for the last 24 hours. **          Labs:  Results from last 7 days   Lab Units 08/23/21  0544   WBC 10*3/mm3 9.00   HEMOGLOBIN g/dL 8.5*   HEMATOCRIT % 25.8*   PLATELETS 10*3/mm3 446     Results from last 7 days   Lab Units 08/23/21  0544   SODIUM mmol/L 130*   POTASSIUM mmol/L 5.3*   CHLORIDE mmol/L 92*   CO2 mmol/L 26.0   BUN mg/dL 59*   CREATININE mg/dL 4.15*   CALCIUM mg/dL 8.7   GLUCOSE mg/dL 166*         Results from last 7 days   Lab Units 08/23/21  0544 08/22/21  0524 08/21/21  0613   ALBUMIN g/dL 3.50 3.60 3.70             Results from last 7 days   Lab Units 08/18/21  0607   MAGNESIUM mg/dL 1.9                   Meds:   SCHEDULE  amiodarone, 200 mg, Oral, Q12H  aspirin, 81 mg, Oral, Daily  atorvastatin, 40 mg, Oral, Nightly  budesonide, 0.5 mg, Nebulization, BID - RT  carvedilol, 12.5 mg, Oral, Q12H  cholecalciferol, 1,000 Units, Oral, Daily  dilTIAZem CD, 240 mg, Oral, Q24H  donepezil, 10 mg, Oral, Nightly  DULoxetine, 60 mg, Oral, Daily  epoetin izabella-epbx, 20,000 Units, Subcutaneous, Weekly  insulin lispro, 0-24 Units, Subcutaneous, 4x Daily With Meals & Nightly  ipratropium-albuterol, 3 mL, Nebulization, Q4H - RT  isosorbide mononitrate, 60 mg, Oral, BID - Nitrates  polyethylene glycol, 17 g, Oral, Daily  senna-docusate sodium, 2 tablet, Oral, BID  sodium chloride, 10 mL, Intravenous, Q12H  vitamin B-12, 1,000 mcg, Oral, Daily      Infusions  hold, 1  each      PRNs  •  acetaminophen **OR** acetaminophen **OR** acetaminophen  •  aluminum-magnesium hydroxide-simethicone  •  dextrose  •  dextrose  •  glucagon (human recombinant)  •  heparin (porcine)  •  hold  •  insulin lispro **AND** insulin lispro  •  melatonin  •  nitroglycerin  •  ondansetron **OR** ondansetron  •  [COMPLETED] Insert peripheral IV **AND** sodium chloride  •  sodium chloride    Physical Exam:  Physical Exam  Cardiovascular:      Heart sounds: Murmur heard.     Pulmonary:      Breath sounds: Rhonchi present.   Abdominal:      Palpations: Abdomen is soft.   Skin:     General: Skin is warm and dry.   Neurological:      Mental Status: He is alert.         ROS  Review of Systems   Respiratory: Positive for shortness of breath.    Neurological: Positive for weakness.       I have reviewed the patient's new clinical results.    Electronically signed by FABIOLA Bui

## 2021-08-23 NOTE — PROGRESS NOTES
"RENAL/KCC:     LOS: 20 days    Patient Care Team:  Samantha Zabala APRN as PCP - General  Samantha Zabala APRN as PCP - Family Medicine  Jose G Rm MD as Consulting Physician (Cardiology)    Chief Complaint:  Palpitations    Subjective     Interval History:   Chart reviewed.  O2 requirement much improved.  For permcath and HD today.    Objective     Vital Sign Min/Max for last 24 hours  Temp  Min: 97.3 °F (36.3 °C)  Max: 98.1 °F (36.7 °C)   BP  Min: 100/40  Max: 121/53   Pulse  Min: 53  Max: 73   Resp  Min: 13  Max: 18   SpO2  Min: 90 %  Max: 100 %   Flow (L/min)  Min: 1  Max: 2   Weight  Min: 98.2 kg (216 lb 7.9 oz)  Max: 98.2 kg (216 lb 7.9 oz)     Flowsheet Rows      First Filed Value   Admission Height  180.3 cm (71\") Documented at 08/03/2021 1733   Admission Weight  113 kg (250 lb) Documented at 08/03/2021 1733          No intake/output data recorded.  I/O last 3 completed shifts:  In: 350 [P.O.:350]  Out: 200 [Urine:200]    Physical Exam:  GEN: Awake, NAD  ENT: PERRL, EOMI, MMM  NECK: Supple, no JVD  CHEST: CTAB, no W/R/C  CV: RRR, no M/G/R  ABD: Soft, NT, +BS  SKIN: Warm and Dry  NEURO: CN's intact      WBC WBC   Date Value Ref Range Status   08/23/2021 9.00 3.40 - 10.80 10*3/mm3 Final   08/22/2021 8.40 3.40 - 10.80 10*3/mm3 Final   08/21/2021 9.00 3.40 - 10.80 10*3/mm3 Final      HGB Hemoglobin   Date Value Ref Range Status   08/23/2021 8.5 (L) 13.0 - 17.7 g/dL Final   08/22/2021 8.7 (L) 13.0 - 17.7 g/dL Final   08/21/2021 9.0 (L) 13.0 - 17.7 g/dL Final      HCT Hematocrit   Date Value Ref Range Status   08/23/2021 25.8 (L) 37.5 - 51.0 % Final   08/22/2021 26.0 (L) 37.5 - 51.0 % Final   08/21/2021 27.3 (L) 37.5 - 51.0 % Final      Platlets No results found for: LABPLAT   MCV MCV   Date Value Ref Range Status   08/23/2021 85.8 79.0 - 97.0 fL Final   08/22/2021 86.0 79.0 - 97.0 fL Final   08/21/2021 86.4 79.0 - 97.0 fL Final          Sodium Sodium   Date Value Ref Range Status   08/23/2021 130 (L) 136 - " 145 mmol/L Final   08/22/2021 132 (L) 136 - 145 mmol/L Final   08/21/2021 133 (L) 136 - 145 mmol/L Final      Potassium Potassium   Date Value Ref Range Status   08/23/2021 5.3 (H) 3.5 - 5.2 mmol/L Final   08/22/2021 5.0 3.5 - 5.2 mmol/L Final   08/21/2021 4.8 3.5 - 5.2 mmol/L Final      Chloride Chloride   Date Value Ref Range Status   08/23/2021 92 (L) 98 - 107 mmol/L Final   08/22/2021 93 (L) 98 - 107 mmol/L Final   08/21/2021 94 (L) 98 - 107 mmol/L Final      CO2 CO2   Date Value Ref Range Status   08/23/2021 26.0 22.0 - 29.0 mmol/L Final   08/22/2021 27.0 22.0 - 29.0 mmol/L Final   08/21/2021 29.0 22.0 - 29.0 mmol/L Final      BUN BUN   Date Value Ref Range Status   08/23/2021 59 (H) 8 - 23 mg/dL Final   08/22/2021 46 (H) 8 - 23 mg/dL Final     Comment:     Result checked   08/21/2021 29 (H) 8 - 23 mg/dL Final      Creatinine Creatinine   Date Value Ref Range Status   08/23/2021 4.15 (H) 0.76 - 1.27 mg/dL Final   08/22/2021 3.37 (H) 0.76 - 1.27 mg/dL Final   08/21/2021 2.57 (H) 0.76 - 1.27 mg/dL Final      Calcium Calcium   Date Value Ref Range Status   08/23/2021 8.7 8.6 - 10.5 mg/dL Final   08/22/2021 8.5 (L) 8.6 - 10.5 mg/dL Final   08/21/2021 8.6 8.6 - 10.5 mg/dL Final      PO4 No results found for: CAPO4   Albumin Albumin   Date Value Ref Range Status   08/23/2021 3.50 3.50 - 5.20 g/dL Final   08/22/2021 3.60 3.50 - 5.20 g/dL Final   08/21/2021 3.70 3.50 - 5.20 g/dL Final      Magnesium No results found for: MG   Uric Acid No results found for: URICACID        Results Review:     I reviewed the patient's new clinical results.    amiodarone, 200 mg, Oral, Q12H  aspirin, 81 mg, Oral, Daily  atorvastatin, 40 mg, Oral, Nightly  budesonide, 0.5 mg, Nebulization, BID - RT  carvedilol, 12.5 mg, Oral, Q12H  cholecalciferol, 1,000 Units, Oral, Daily  dilTIAZem CD, 240 mg, Oral, Q24H  donepezil, 10 mg, Oral, Nightly  DULoxetine, 60 mg, Oral, Daily  epoetin izabella-epbx, 20,000 Units, Subcutaneous, Weekly  insulin  lispro, 0-24 Units, Subcutaneous, 4x Daily With Meals & Nightly  ipratropium-albuterol, 3 mL, Nebulization, Q4H - RT  isosorbide mononitrate, 60 mg, Oral, BID - Nitrates  polyethylene glycol, 17 g, Oral, Daily  senna-docusate sodium, 2 tablet, Oral, BID  sodium chloride, 10 mL, Intravenous, Q12H  vitamin B-12, 1,000 mcg, Oral, Daily      hold, 1 each        Medication Review: Reviewed    Assessment/Plan       Acute on chronic systolic heart failure (CMS/HCC)    S/P CABG (coronary artery bypass graft)    Essential hypertension    Elevated troponin    ANNETTE treated with BiPAP    Major depressive disorder, recurrent, mild (CMS/HCC)    Mixed hyperlipidemia    History of cerebrovascular accident    Flaccid hemiplegia of right dominant side as late effect of cerebral infarction (CMS/[facility])    Diabetic neuropathy (CMS/[facility])    Unspecified dementia with behavioral disturbance (CMS/[facility])    Coronary artery disease    Obesity    Vitamin D deficiency    Chronic venous hypertension (idiopathic) with ulcer and inflammation of bilateral lower extremity (CMS/[facility])    Chronic obstructive pulmonary disease, unspecified (CMS/[facility])    Chronic foot ulcer (CMS/HCC)    Paroxysmal atrial fibrillation (CMS/HCC)    Acute kidney injury (CMS/[facility])    Type 2 diabetes mellitus with hyperglycemia (CMS/[facility])    CKD (chronic kidney disease) stage 3, GFR 30-59 ml/min (CMS/HCC)    Anemia of renal disease    Acute respiratory failure with hypoxia and hypercapnia (CMS/HCC)      1. DILEEP  2. CKD III  3. Hyperkalemia  4. Pulmonary edema  5. Tachycardia  6. CHF  7. T2DM  8. Hypertension  9. Anemia     PLAN:  HD MWF.  Will plan permcath today.  Social work to look for outpatient HD spot for acute renal failure.       Yony Bhat M.D.  [facility]

## 2021-08-23 NOTE — SIGNIFICANT NOTE
08/23/21 1140   Rehab Time/Intention   Session Not Performed patient unavailable for treatment  (at dialysis then for permanent cath placement. OT will complete 2-week re-evaluation at next visit.)   Recommendation   OT - Next Appointment 08/24/21

## 2021-08-23 NOTE — PLAN OF CARE
Problem: Arrhythmia/Dysrhythmia  Goal: Normalized Cardiac Rhythm  Outcome: Ongoing, Progressing     Problem: Fall Injury Risk  Goal: Absence of Fall and Fall-Related Injury  Outcome: Ongoing, Progressing  Intervention: Identify and Manage Contributors to Fall Injury Risk  Recent Flowsheet Documentation  Taken 8/23/2021 1645 by Umm Garcia RN  Self-Care Promotion: independence encouraged  Taken 8/23/2021 1525 by Umm Garcia RN  Self-Care Promotion: independence encouraged  Taken 8/23/2021 0830 by Umm Garcia RN  Self-Care Promotion: independence encouraged  Intervention: Promote Injury-Free Environment  Recent Flowsheet Documentation  Taken 8/23/2021 1645 by Umm Garcia RN  Safety Promotion/Fall Prevention:   assistive device/personal items within reach   clutter free environment maintained   fall prevention program maintained   nonskid shoes/slippers when out of bed   room organization consistent   safety round/check completed  Taken 8/23/2021 1600 by Umm Garcia RN  Safety Promotion/Fall Prevention: safety round/check completed  Taken 8/23/2021 1525 by Umm Garcia RN  Safety Promotion/Fall Prevention: (Patient arrived back to the unit at this time)   assistive device/personal items within reach   clutter free environment maintained   fall prevention program maintained   room organization consistent   safety round/check completed   nonskid shoes/slippers when out of bed  Taken 8/23/2021 1400 by Umm Garcia RN  Safety Promotion/Fall Prevention: safety round/check completed  Taken 8/23/2021 1200 by Umm Garcia RN  Safety Promotion/Fall Prevention: patient off unit  Taken 8/23/2021 1000 by Umm Garcia RN  Safety Promotion/Fall Prevention: (At dialysis) patient off unit  Taken 8/23/2021 0925 by Umm Garcia RN  Safety Promotion/Fall Prevention: (Patient left for dialysis at this time) patient off unit  Taken 8/23/2021 0830 by Umm Garcia  RN  Safety Promotion/Fall Prevention:   assistive device/personal items within reach   clutter free environment maintained   fall prevention program maintained   nonskid shoes/slippers when out of bed   room organization consistent   safety round/check completed  Taken 8/23/2021 0800 by Umm Garcia RN  Safety Promotion/Fall Prevention: safety round/check completed     Problem: Adult Inpatient Plan of Care  Goal: Plan of Care Review  Outcome: Ongoing, Progressing  Goal: Patient-Specific Goal (Individualized)  Outcome: Ongoing, Progressing  Goal: Absence of Hospital-Acquired Illness or Injury  Outcome: Ongoing, Progressing  Intervention: Identify and Manage Fall Risk  Recent Flowsheet Documentation  Taken 8/23/2021 1645 by Umm Garcia RN  Safety Promotion/Fall Prevention:   assistive device/personal items within reach   clutter free environment maintained   fall prevention program maintained   nonskid shoes/slippers when out of bed   room organization consistent   safety round/check completed  Taken 8/23/2021 1600 by Umm Garcia RN  Safety Promotion/Fall Prevention: safety round/check completed  Taken 8/23/2021 1525 by Umm Garcia RN  Safety Promotion/Fall Prevention: (Patient arrived back to the unit at this time)   assistive device/personal items within reach   clutter free environment maintained   fall prevention program maintained   room organization consistent   safety round/check completed   nonskid shoes/slippers when out of bed  Taken 8/23/2021 1400 by Umm Garcia RN  Safety Promotion/Fall Prevention: safety round/check completed  Taken 8/23/2021 1200 by Umm Garcia RN  Safety Promotion/Fall Prevention: patient off unit  Taken 8/23/2021 1000 by Umm Garcia RN  Safety Promotion/Fall Prevention: (At dialysis) patient off unit  Taken 8/23/2021 0925 by Umm Garcia RN  Safety Promotion/Fall Prevention: (Patient left for dialysis at this time) patient off  unit  Taken 8/23/2021 0830 by Umm Garcia RN  Safety Promotion/Fall Prevention:   assistive device/personal items within reach   clutter free environment maintained   fall prevention program maintained   nonskid shoes/slippers when out of bed   room organization consistent   safety round/check completed  Taken 8/23/2021 0800 by Umm Garcia RN  Safety Promotion/Fall Prevention: safety round/check completed  Intervention: Prevent Skin Injury  Recent Flowsheet Documentation  Taken 8/23/2021 1645 by Umm Garcia RN  Body Position: position maintained  Skin Protection:   adhesive use limited   tubing/devices free from skin contact  Taken 8/23/2021 1525 by Umm Garcia RN  Body Position: position maintained  Skin Protection:   adhesive use limited   tubing/devices free from skin contact  Taken 8/23/2021 0830 by Umm Garcia RN  Body Position: position maintained  Skin Protection:   adhesive use limited   tubing/devices free from skin contact  Intervention: Prevent Infection  Recent Flowsheet Documentation  Taken 8/23/2021 1645 by Umm Garcia RN  Infection Prevention: hand hygiene promoted  Taken 8/23/2021 1525 by Umm Garcia RN  Infection Prevention: hand hygiene promoted  Taken 8/23/2021 0830 by Umm Garcia RN  Infection Prevention: hand hygiene promoted  Goal: Optimal Comfort and Wellbeing  Outcome: Ongoing, Progressing  Goal: Readiness for Transition of Care  Outcome: Ongoing, Progressing     Problem: Skin Injury Risk Increased  Goal: Skin Health and Integrity  Outcome: Ongoing, Progressing  Intervention: Optimize Skin Protection  Recent Flowsheet Documentation  Taken 8/23/2021 1645 by Umm Garcia RN  Pressure Reduction Techniques: frequent weight shift encouraged  Head of Bed (HOB): HOB at 30-45 degrees  Pressure Reduction Devices: pressure-redistributing mattress utilized  Skin Protection:   adhesive use limited   tubing/devices free from skin contact  Taken  8/23/2021 1525 by Umm Garcia RN  Pressure Reduction Techniques: frequent weight shift encouraged  Head of Bed (HOB): HOB at 30-45 degrees  Pressure Reduction Devices: pressure-redistributing mattress utilized  Skin Protection:   adhesive use limited   tubing/devices free from skin contact  Taken 8/23/2021 0830 by Umm Garcia RN  Pressure Reduction Techniques: frequent weight shift encouraged  Head of Bed (HOB): HOB at 20-30 degrees  Pressure Reduction Devices: pressure-redistributing mattress utilized  Skin Protection:   adhesive use limited   tubing/devices free from skin contact     Problem: Breathing Pattern Ineffective  Goal: Effective Breathing Pattern  Outcome: Ongoing, Progressing  Intervention: Promote Improved Breathing Pattern  Recent Flowsheet Documentation  Taken 8/23/2021 1645 by Umm Garcia RN  Head of Bed (HOB): HOB at 30-45 degrees  Taken 8/23/2021 1525 by Umm Garcia RN  Head of Bed (HOB): HOB at 30-45 degrees  Taken 8/23/2021 0830 by Umm Garcia RN  Head of Bed (HOB): HOB at 20-30 degrees     Problem: Noninvasive Ventilation Acute  Goal: Effective Unassisted Ventilation and Oxygenation  Outcome: Ongoing, Progressing     Problem: Fluid Volume Excess  Goal: Fluid Balance  Outcome: Ongoing, Progressing  Intervention: Monitor and Manage Hypervolemia  Recent Flowsheet Documentation  Taken 8/23/2021 1645 by Umm Garcia RN  Skin Protection:   adhesive use limited   tubing/devices free from skin contact  Taken 8/23/2021 1525 by Umm Garcia RN  Skin Protection:   adhesive use limited   tubing/devices free from skin contact  Taken 8/23/2021 0830 by Umm Garcia RN  Skin Protection:   adhesive use limited   tubing/devices free from skin contact   Goal Outcome Evaluation:      Patient had dialysis today and also went to interventional radiology to have a HD cath placed. The patient remains on 1-2L of oxygen and did not have any complaints. Will continue  to monitor.

## 2021-08-23 NOTE — CASE MANAGEMENT/SOCIAL WORK
Continued Stay Note  DOREEN Trentyd     Patient Name: Benson Mclean  MRN: [id number]  Today's Date: 8/23/2021    Admit Date: 8/3/2021    Discharge Plan     Row Name 08/23/21 1417       Plan    Plan  D/C Plan: Return to [facility]. Will require precert. No PASRR required. New [facility] OP HD referral started 8/18-pending chair time.    Plan Comments  8/23/21 permacath placement today. O2 1Lpnc. VM left for [facility] re: chair time.        Chart review only.    Expected Discharge Date and Time     Expected Discharge Date Expected Discharge Time    Aug 23, 2021             Luis Daniel Wolff RN

## 2021-08-23 NOTE — NURSING NOTE
Agnes applied dermabond to both sites.  RIJ:  Internal suture, dermabond, steristrip.  Permcath dressing:  Sutures, dermabond, bio disk and sorbaview.

## 2021-08-23 NOTE — PROGRESS NOTES
[facility] Medicine Services Daily Progress Note    Patient Name: Benson Mclean  : 1950  MRN: [id number]  Primary Care Physician:  Samantha Zabala APRN  Date of admission: 8/3/2021      Subjective      Chief Complaint: Shortness of breath.      Patient Reports     21: s/p HD yesterday.  Feels better.  On 65% FiO2.  Informed the patient that he is getting decreased insulin dose to minimize hypoglycemia.  21: Feels better.  Planned HD today.  On 50% FiO2.  21: Feels better.  21: OOB to chair.  No complaints.  Permacath to be placed today.  Case management working on arranging dialysis chair.  21: Constipation.  Ordered stool softeners.  Permacath scheduled for 21.  On 2L now.  At baseline uses wheelchair/walker.  Encouraged OOB to chair.  21:  + Bowel movement. On 2L.  Feels better.  Not on home oxygen.  Planned permacath tomorrow.       Patient had dialysis today  Had tunneled cath as well.  No new events or symptoms overnight      Review of Systems   All other systems reviewed and are negative.         Objective      Vitals:   Temp:  [97.3 °F (36.3 °C)-98.1 °F (36.7 °C)] 97.7 °F (36.5 °C)  Heart Rate:  [53-73] 66  Resp:  [13-18] 18  BP: (100-121)/(40-55) 105/55  Flow (L/min):  [1-1.5] 1    Physical Exam  Constitutional:       General: He is not in acute distress.  HENT:      Head: Normocephalic and atraumatic.      Nose: Nose normal.   Eyes:      General: No scleral icterus.     Extraocular Movements: Extraocular movements intact.      Pupils: Pupils are equal, round, and reactive to light.   Cardiovascular:      Rate and Rhythm: Normal rate and regular rhythm.   Pulmonary:      Effort: Pulmonary effort is normal.      Breath sounds: Examination of the right-lower field reveals decreased breath sounds. Examination of the left-lower field reveals decreased breath sounds. Decreased breath sounds present.   Abdominal:      General: Bowel  sounds are normal.      Comments: Obese abdomen.   Musculoskeletal:      Cervical back: Normal range of motion.      Comments: Decreased range of motion hips   Lymphadenopathy:      Cervical: No cervical adenopathy.   Skin:     General: Skin is warm.      Findings: No rash.   Neurological:      Mental Status: He is alert.      Cranial Nerves: Cranial nerves are intact.   Psychiatric:         Mood and Affect: Mood normal.           Result Review    Result Review:  I have personally reviewed the results from the time of this admission to 8/23/2021 12:10 EDT and agree with these findings:  [x]  Laboratory  [x]  Microbiology  [x]  Radiology  []  EKG/Telemetry   [x]  Cardiology/Vascular   []  Pathology  [x]  Old records  []  Other:            Assessment/Plan      Brief Patient Summary:    70 years old male initially admitted to [facility]  for A. fib RVR and fluid overload and eventually transfer to ICU for worsening hypoxemia and eventual initiation on hemodialysis for worsening DILEEP.       amiodarone, 200 mg, Oral, Q12H  aspirin, 81 mg, Oral, Daily  atorvastatin, 40 mg, Oral, Nightly  budesonide, 0.5 mg, Nebulization, BID - RT  calcium acetate, 667 mg, Oral, TID With Meals  carvedilol, 12.5 mg, Oral, Q12H  cholecalciferol, 1,000 Units, Oral, Daily  dilTIAZem CD, 240 mg, Oral, Q24H  donepezil, 10 mg, Oral, Nightly  DULoxetine, 60 mg, Oral, Daily  epoetin izabella-epbx, 20,000 Units, Subcutaneous, Weekly  insulin lispro, 0-24 Units, Subcutaneous, 4x Daily With Meals & Nightly  ipratropium-albuterol, 3 mL, Nebulization, Q4H - RT  isosorbide mononitrate, 60 mg, Oral, BID - Nitrates  polyethylene glycol, 17 g, Oral, Daily  senna-docusate sodium, 2 tablet, Oral, BID  sodium chloride, 10 mL, Intravenous, Q12H  vitamin B-12, 1,000 mcg, Oral, Daily       hold, 1 each         Active Hospital Problems:  Active Hospital Problems    Diagnosis    • **Acute on chronic systolic heart failure (CMS/HCC)    • Acute respiratory failure with hypoxia  and hypercapnia (CMS/[facility])    • Anemia of renal disease    • Acute kidney injury (CMS/[facility])    • CKD (chronic kidney disease) stage 3, GFR 30-59 ml/min (CMS/[facility])    • Type 2 diabetes mellitus with hyperglycemia (CMS/[facility])    • Mixed hyperlipidemia    • Obesity    • History of cerebrovascular accident    • Chronic obstructive pulmonary disease, unspecified (CMS/[facility])    • Flaccid hemiplegia of right dominant side as late effect of cerebral infarction (CMS/[facility])    • Unspecified dementia with behavioral disturbance (CMS/[facility])    • Major depressive disorder, recurrent, mild (CMS/[facility])    • Paroxysmal atrial fibrillation (CMS/[facility])    • Elevated troponin    • Essential hypertension    • S/P CABG (coronary artery bypass graft)    • Chronic venous hypertension (idiopathic) with ulcer and inflammation of bilateral lower extremity (CMS/[facility])    • Chronic foot ulcer (CMS/[facility])    • Coronary artery disease    • Vitamin D deficiency    • ANNETTE treated with BiPAP    • Diabetic neuropathy (CMS/[facility])           Acute on chronic systolic heart failure   -Improved with hemodialysis and diuretics  -on  Coreg, aspirin and Imdur   -ACE-I/ARB contraindicated d/t DILEEP     Acute respiratory failure with hypoxia and hypercapnia: Improving  -Not on home oxygen  -secondary to fluid overload and reason for transfer to ICU on 8/14/2020  -Initially on precision flow now on 2L nasal cannula  -titrate O2 to keep sat >90%  -AVAPS PRN  -pulmonary following     Acute kidney injury, CKD (chronic kidney disease) stage III complicated with fluid overload (POA)  -nephrology following  -Right IJ Shiley inserted and hemodialysis initiated on 08/14/21  -Permacath 8/23/2021 and needs outpatient dialysis arranged  -Social work working on dialysis chair        Elevated troponin:  -Denies chest pain  -likely demand ischemia  -cardiology following     Paroxysmal atrial fibrillation with RVR (POA)  -continue amiodarone, diltiazem CD, Coreg  -Eliquis held due to plans for  permacath placement 8/23/2021  -Cardiology following     Coronary artery disease, S/P CABG / Essential hypertension, chronic / Mixed hyperlipidemia  -continue aspirin, Eliquis, statin, Bumex, carvedilol, diltiazem CD, hydralazine, Imdur, and metolazone  -monitor BP     ANNETTE:  -Claims to be compliant with CPAP at home  -currently using hospital AVAP     Major depressive disorder:  -continue Cymbalta      History of cerebrovascular accident  -Flaccid hemiplegia of right dominant side as late effect of cerebral infarction   -At baseline walks with a walker wheelchair at home  -fall precautions  -continue aspirin, Elqius, and statin      Type 2 diabetes mellitus with hyperglycemia complicated with Diabetic neuropathy   -A1c 7.4% on 07/21/21  -Continue ISS     Mild dementia with behavioral disturbance  -continue donepezil      Obesity  -BMI 37.39   -encourage lifestyle modifications      Vitamin D deficiency  -continue cholecalciferol      Chronic obstructive pulmonary disease:  - former smoker  -stable without exacerbation  -continue bronchodilators      Anemia of chronic disease   -on weekly Retacrit   -Monitor H&H    DVT prophylaxis:  Medical DVT prophylaxis orders are present.    CODE STATUS:    Code Status: CPR  Medical Interventions (Level of Support Prior to Arrest): Full      Disposition:  I expect patient to be discharged rehab.    This patient has been examined wearing appropriate Personal Protective Equipment and discussed with nursing. 08/23/21      Electronically signed by Du Zamorano MD, 08/23/21, 12:10 EDT.  [facility] Hospitalist Team

## 2021-08-23 NOTE — SIGNIFICANT NOTE
08/23/21 1134   OTHER   Discipline physical therapy assistant   Rehab Time/Intention   Session Not Performed patient unavailable for treatment  (Pt out of room at HD. Will also need PT Re-eval on 8/24 due to LOS.)   Recommendation   PT - Next Appointment 08/24/21

## 2021-08-24 LAB
ALBUMIN SERPL-MCNC: 3.8 G/DL (ref 3.5–5.2)
ANION GAP SERPL CALCULATED.3IONS-SCNC: 13 MMOL/L (ref 5–15)
BASOPHILS # BLD AUTO: 0.1 10*3/MM3 (ref 0–0.2)
BASOPHILS NFR BLD AUTO: 1.2 % (ref 0–1.5)
BUN SERPL-MCNC: 39 MG/DL (ref 8–23)
BUN/CREAT SERPL: 12 (ref 7–25)
CALCIUM SPEC-SCNC: 8.8 MG/DL (ref 8.6–10.5)
CHLORIDE SERPL-SCNC: 92 MMOL/L (ref 98–107)
CO2 SERPL-SCNC: 26 MMOL/L (ref 22–29)
CREAT SERPL-MCNC: 3.25 MG/DL (ref 0.76–1.27)
DEPRECATED RDW RBC AUTO: 49.4 FL (ref 37–54)
EOSINOPHIL # BLD AUTO: 0.5 10*3/MM3 (ref 0–0.4)
EOSINOPHIL NFR BLD AUTO: 5.3 % (ref 0.3–6.2)
ERYTHROCYTE [DISTWIDTH] IN BLOOD BY AUTOMATED COUNT: 16.2 % (ref 12.3–15.4)
GFR SERPL CREATININE-BSD FRML MDRD: 19 ML/MIN/1.73
GLUCOSE BLDC GLUCOMTR-MCNC: 131 MG/DL (ref 70–105)
GLUCOSE BLDC GLUCOMTR-MCNC: 175 MG/DL (ref 70–105)
GLUCOSE BLDC GLUCOMTR-MCNC: 210 MG/DL (ref 70–105)
GLUCOSE BLDC GLUCOMTR-MCNC: 263 MG/DL (ref 70–105)
GLUCOSE SERPL-MCNC: 164 MG/DL (ref 65–99)
HCT VFR BLD AUTO: 29.6 % (ref 37.5–51)
HGB BLD-MCNC: 9.8 G/DL (ref 13–17.7)
LYMPHOCYTES # BLD AUTO: 1.2 10*3/MM3 (ref 0.7–3.1)
LYMPHOCYTES NFR BLD AUTO: 13.3 % (ref 19.6–45.3)
MCH RBC QN AUTO: 28.5 PG (ref 26.6–33)
MCHC RBC AUTO-ENTMCNC: 33.1 G/DL (ref 31.5–35.7)
MCV RBC AUTO: 86.2 FL (ref 79–97)
MONOCYTES # BLD AUTO: 1.1 10*3/MM3 (ref 0.1–0.9)
MONOCYTES NFR BLD AUTO: 12.4 % (ref 5–12)
NEUTROPHILS NFR BLD AUTO: 6.2 10*3/MM3 (ref 1.7–7)
NEUTROPHILS NFR BLD AUTO: 67.8 % (ref 42.7–76)
NRBC BLD AUTO-RTO: 0.1 /100 WBC (ref 0–0.2)
PHOSPHATE SERPL-MCNC: 6.3 MG/DL (ref 2.5–4.5)
PLATELET # BLD AUTO: 403 10*3/MM3 (ref 140–450)
PMV BLD AUTO: 7.5 FL (ref 6–12)
POTASSIUM SERPL-SCNC: 4.8 MMOL/L (ref 3.5–5.2)
RBC # BLD AUTO: 3.43 10*6/MM3 (ref 4.14–5.8)
SODIUM SERPL-SCNC: 131 MMOL/L (ref 136–145)
WBC # BLD AUTO: 9.2 10*3/MM3 (ref 3.4–10.8)

## 2021-08-24 PROCEDURE — 63710000001 INSULIN LISPRO (HUMAN) PER 5 UNITS: Performed by: HOSPITALIST

## 2021-08-24 PROCEDURE — 99232 SBSQ HOSP IP/OBS MODERATE 35: CPT | Performed by: INTERNAL MEDICINE

## 2021-08-24 PROCEDURE — 94799 UNLISTED PULMONARY SVC/PX: CPT

## 2021-08-24 PROCEDURE — 85025 COMPLETE CBC W/AUTO DIFF WBC: CPT | Performed by: INTERNAL MEDICINE

## 2021-08-24 PROCEDURE — 82962 GLUCOSE BLOOD TEST: CPT

## 2021-08-24 PROCEDURE — 97164 PT RE-EVAL EST PLAN CARE: CPT

## 2021-08-24 PROCEDURE — 97535 SELF CARE MNGMENT TRAINING: CPT

## 2021-08-24 PROCEDURE — 80069 RENAL FUNCTION PANEL: CPT | Performed by: INTERNAL MEDICINE

## 2021-08-24 PROCEDURE — 94660 CPAP INITIATION&MGMT: CPT

## 2021-08-24 PROCEDURE — 97168 OT RE-EVAL EST PLAN CARE: CPT

## 2021-08-24 RX ORDER — ALBUMIN (HUMAN) 12.5 G/50ML
25 SOLUTION INTRAVENOUS AS NEEDED
Status: CANCELLED | OUTPATIENT
Start: 2021-08-25 | End: 2021-08-25

## 2021-08-24 RX ORDER — AMIODARONE HYDROCHLORIDE 200 MG/1
200 TABLET ORAL
Status: DISCONTINUED | OUTPATIENT
Start: 2021-08-25 | End: 2021-08-25 | Stop reason: HOSPADM

## 2021-08-24 RX ADMIN — ASPIRIN 81 MG: 81 TABLET, CHEWABLE ORAL at 08:42

## 2021-08-24 RX ADMIN — BUDESONIDE 0.5 MG: 0.5 SUSPENSION RESPIRATORY (INHALATION) at 07:04

## 2021-08-24 RX ADMIN — INSULIN LISPRO 8 UNITS: 100 INJECTION, SOLUTION INTRAVENOUS; SUBCUTANEOUS at 18:10

## 2021-08-24 RX ADMIN — DONEPEZIL HYDROCHLORIDE 10 MG: 5 TABLET, FILM COATED ORAL at 20:47

## 2021-08-24 RX ADMIN — DULOXETINE 60 MG: 30 CAPSULE, DELAYED RELEASE ORAL at 08:42

## 2021-08-24 RX ADMIN — IPRATROPIUM BROMIDE AND ALBUTEROL SULFATE 3 ML: 2.5; .5 SOLUTION RESPIRATORY (INHALATION) at 07:04

## 2021-08-24 RX ADMIN — ATORVASTATIN CALCIUM 40 MG: 40 TABLET, FILM COATED ORAL at 20:47

## 2021-08-24 RX ADMIN — CALCIUM ACETATE 667 MG: 667 CAPSULE ORAL at 18:10

## 2021-08-24 RX ADMIN — IPRATROPIUM BROMIDE AND ALBUTEROL SULFATE 3 ML: 2.5; .5 SOLUTION RESPIRATORY (INHALATION) at 10:45

## 2021-08-24 RX ADMIN — CALCIUM ACETATE 667 MG: 667 CAPSULE ORAL at 13:30

## 2021-08-24 RX ADMIN — INSULIN LISPRO 12 UNITS: 100 INJECTION, SOLUTION INTRAVENOUS; SUBCUTANEOUS at 13:30

## 2021-08-24 RX ADMIN — APIXABAN 5 MG: 5 TABLET, FILM COATED ORAL at 20:47

## 2021-08-24 RX ADMIN — BUDESONIDE 0.5 MG: 0.5 SUSPENSION RESPIRATORY (INHALATION) at 19:19

## 2021-08-24 RX ADMIN — IPRATROPIUM BROMIDE AND ALBUTEROL SULFATE 3 ML: 2.5; .5 SOLUTION RESPIRATORY (INHALATION) at 19:19

## 2021-08-24 RX ADMIN — INSULIN LISPRO 4 UNITS: 100 INJECTION, SOLUTION INTRAVENOUS; SUBCUTANEOUS at 08:42

## 2021-08-24 RX ADMIN — CARVEDILOL 12.5 MG: 6.25 TABLET, FILM COATED ORAL at 20:47

## 2021-08-24 RX ADMIN — CALCIUM ACETATE 667 MG: 667 CAPSULE ORAL at 08:42

## 2021-08-24 RX ADMIN — Medication 10 ML: at 20:50

## 2021-08-24 RX ADMIN — CYANOCOBALAMIN TAB 1000 MCG 1000 MCG: 1000 TAB at 08:42

## 2021-08-24 RX ADMIN — Medication 1000 UNITS: at 08:42

## 2021-08-24 RX ADMIN — Medication 10 ML: at 08:44

## 2021-08-24 RX ADMIN — APIXABAN 5 MG: 5 TABLET, FILM COATED ORAL at 15:10

## 2021-08-24 NOTE — PROGRESS NOTES
Daily Progress Note        Acute on chronic systolic heart failure (CMS/Formerly [facility])    S/P CABG (coronary artery bypass graft)    Essential hypertension    Elevated troponin    ANNETTE treated with BiPAP    Major depressive disorder, recurrent, mild (CMS/[facility]ly [facility])    Mixed hyperlipidemia    History of cerebrovascular accident    Flaccid hemiplegia of right dominant side as late effect of cerebral infarction (CMS/[facility]ly [facility])    Diabetic neuropathy (CMS/[facility]ly [facility])    Unspecified dementia with behavioral disturbance (CMS/[facility]ly [facility])    Coronary artery disease    Obesity    Vitamin D deficiency    Chronic venous hypertension (idiopathic) with ulcer and inflammation of bilateral lower extremity (CMS/[facility])    Chronic obstructive pulmonary disease, unspecified (CMS/[facility]ly [facility])    Chronic foot ulcer (CMS/[facility]ly [facility])    Paroxysmal atrial fibrillation (CMS/[facility])    Acute kidney injury (CMS/[facility]ly [facility])    Type 2 diabetes mellitus with hyperglycemia (CMS/[facility])    CKD (chronic kidney disease) stage 3, GFR 30-59 ml/min (CMS/[facility]ly [facility])    Anemia of renal disease    Acute respiratory failure with hypoxia and hypercapnia (CMS/[facility])      Assessment    Pneumonia  COPD  Bilateral pleural effusion  ANNETTE uses BiPAP    Atrial fibrillation with RVR   Acute on chronic systolic heart failure  CAD S/P CABG   HTN  Elevated troponin  Hyperlipidemia  Flaccid hemiplegia of right dominant side as late effect of cerebral infarction   DM II with neuropathy   Dementia with behavioral disturbance  Chronic venous hypertension (idiopathic) with ulcer/inflammation of BLE  Chronic foot ulcer   CKD   Tobacco use disorder   Anemia      ECHO 7/21/21  EF 30%  -Cardiology following    Plan    Encourage OOB daily   Titrate oxygen to keep sat > 88%  Threes per nephrology  Bronchodilator/Inhaled corticosteroids  Glycemic control  DVT prophylaxis         LOS: 21 days     Subjective         Objective     Vital signs for last 24 hours:  Vitals:    08/23/21 2236 08/23/21 2253 08/24/21 0250 08/24/21 0641   BP:  90/45 106/47 96/51   BP  Location:  Left arm Left arm Left arm   Patient Position:  Lying Lying Lying   Pulse: 55 56 59 85   Resp: 20 20 14 12   Temp:  99.2 °F (37.3 °C) 99.8 °F (37.7 °C) 97.6 °F (36.4 °C)   TempSrc:  Oral Oral Oral   SpO2: 97% 100% 96% 100%   Weight:    98.6 kg (217 lb 6 oz)   Height:           Intake/Output last 3 shifts:  I/O last 3 completed shifts:  In: -   Out: 3030 [Urine:30; Other:3000]  Intake/Output this shift:  No intake/output data recorded.      Radiology  Imaging Results (Last 24 Hours)     Procedure Component Value Units Date/Time    IR Insert Tunneled CV Catheter Without Port 5 Plus [[id number]] Collected: 08/23/21 1527     Updated: 08/23/21 1530    Narrative:      DATE OF EXAM:  8/23/2021 2:04 PM     PROCEDURE:  IR INS TUNNELED CV CATHETER WO PORT 5 PLUS-     INDICATIONS:  Change Shiley to PermCath for ongoing HD; R00.0-Tachycardia,  unspecified; R06.00-Dyspnea, unspecified     COMPARISON:  No Comparisons Available     FLUOROSCOPIC TIME:  21 seconds     PHYSICIAN MONITORED CONSCIOUS SEDATION TIME:  12 minutes     TECHNIQUE:   After informed consent was obtained a timeout was performed. The  interventional radiology nurse monitored the patient at all times and  provided conscious sedation. The patient was placed supine on the  fluoroscopy table and the right neck and chest were prepped and draped  using maximal sterile barrier technique. The skin and subcutaneous  tissues were anesthetized with 1% lidocaine. A small skin incision was  made. Next, under direct ultrasound guidance access was obtained to the  right internal jugular vein with a micropuncture kit. Ultrasound  demonstrated patent right internal jugular vein A guidewire was  manipulated centrally under fluoroscopic guidance.     Next, an appropriate exit site was marked, anesthetized and a small skin  incision was made. The PermCath was then tunneled from the exit site to  the access site.     The access site was then serially dilated to accommodate  a peel-away  sheath. The catheter was then placed through the peel-away sheath and  the peel-away sheath and wire were removed. Fluoroscopy confirmed  catheter tip in satisfactory position. Both ports aspirated and flushed  normally and were locked with heparinized saline solution. The catheter  was secured to the skin with Prolene. The access site was closed with  Dermabond and Steri-Strips. The patient tolerated the procedure well  without immediate complication.     FINDINGS:  See above        Impression:      Successful placement of right IJ PermCath.     Electronically Signed By-Niels Brooks MD On:8/23/2021 3:28 PM  This report was finalized on [id number] by  Niels Brooks MD.          Labs:  Results from last 7 days   Lab Units 08/24/21  0435   WBC 10*3/mm3 9.20   HEMOGLOBIN g/dL 9.8*   HEMATOCRIT % 29.6*   PLATELETS 10*3/mm3 403     Results from last 7 days   Lab Units 08/24/21  0435   SODIUM mmol/L 131*   POTASSIUM mmol/L 4.8   CHLORIDE mmol/L 92*   CO2 mmol/L 26.0   BUN mg/dL 39*   CREATININE mg/dL 3.25*   CALCIUM mg/dL 8.8   GLUCOSE mg/dL 164*         Results from last 7 days   Lab Units 08/24/21  0435 08/23/21  0544 08/22/21  0524   ALBUMIN g/dL 3.80 3.50 3.60             Results from last 7 days   Lab Units 08/18/21  0607   MAGNESIUM mg/dL 1.9                   Meds:   SCHEDULE  amiodarone, 200 mg, Oral, Q12H  aspirin, 81 mg, Oral, Daily  atorvastatin, 40 mg, Oral, Nightly  budesonide, 0.5 mg, Nebulization, BID - RT  calcium acetate, 667 mg, Oral, TID With Meals  carvedilol, 12.5 mg, Oral, Q12H  cholecalciferol, 1,000 Units, Oral, Daily  dilTIAZem CD, 240 mg, Oral, Q24H  donepezil, 10 mg, Oral, Nightly  DULoxetine, 60 mg, Oral, Daily  epoetin izabella-epbx, 20,000 Units, Subcutaneous, Weekly  insulin lispro, 0-24 Units, Subcutaneous, 4x Daily With Meals & Nightly  ipratropium-albuterol, 3 mL, Nebulization, Q4H - RT  isosorbide mononitrate, 60 mg, Oral, BID - Nitrates  polyethylene glycol, 17 g, Oral,  Daily  senna-docusate sodium, 2 tablet, Oral, BID  sodium chloride, 10 mL, Intravenous, Q12H  vitamin B-12, 1,000 mcg, Oral, Daily      Infusions  hold, 1 each      PRNs  •  acetaminophen **OR** acetaminophen **OR** acetaminophen  •  aluminum-magnesium hydroxide-simethicone  •  dextrose  •  dextrose  •  glucagon (human recombinant)  •  heparin (porcine)  •  hold  •  insulin lispro **AND** insulin lispro  •  melatonin  •  nitroglycerin  •  ondansetron **OR** ondansetron  •  [COMPLETED] Insert peripheral IV **AND** sodium chloride  •  sodium chloride    Physical Exam:  Physical Exam  Cardiovascular:      Heart sounds: Murmur heard.     Pulmonary:      Breath sounds: Rhonchi present.   Abdominal:      Palpations: Abdomen is soft.   Skin:     General: Skin is warm and dry.   Neurological:      Mental Status: He is alert.         ROS  Review of Systems   Neurological: Positive for weakness.       I have reviewed the patient's new clinical results.    Electronically signed by FABIOLA Bui

## 2021-08-24 NOTE — PLAN OF CARE
Goal Outcome Evaluation:  Plan of Care Reviewed With: patient        Progress: improving  Outcome Summary: This is re eval due to length of stay. Pt initially admitted on8/3 due to palpitations/tachycardia.Patient now with ESRD on dialysis. Patient has made good progress since initial assessment however has not achieved goals set. Pt still requiring min A for supine to sit transfer. CGA to come to standing using rw from elevated bed. Min A needed to ambulate using rw; gait slow with several episodes of post loss of balance that pt is able to correct at times.Pt lives with spouse who is unable to physically assist pt therefore pt will need short IP rehab for strength, balance and mobility training.

## 2021-08-24 NOTE — CASE MANAGEMENT/SOCIAL WORK
Continued Stay Note  [facility]     Patient Name: Benson Mclean  MRN: [id number]  Today's Date: 8/24/2021    Admit Date: 8/3/2021    Discharge Plan     Row Name 08/24/21 1704       Plan    Plan  DC Plan: Okay to return to [facility] pending precert. Precert started 8/24. No PASRR needed. New OP HD arrangements confirmed at [facility] MWF 6:15am.    Plan Comments  CM contacted [facility] ([phone]). Confirmed OP HD at [facility] in [place] with confirmed schedule of MWF at 6:15am, tentative first treatment Friday, 8/27 with arrival request for 6am. CM updated [facility] liaison of HD schedule and requested for precert to be started.        Phone communication or documentation only - no physical contact with patient or family.    Kristina Gastelum RN     Office Phone: [phone]  Office Cell: [phone]

## 2021-08-24 NOTE — PROGRESS NOTES
Referring Provider: Du Zamorano *    Reason for follow-up:  Atrial fibrillation  Cardiomyopathy  Shortness of breath  Bradycardia  Respiratory insufficiency  ESRD      Patient Care Team:  Samantha Zabala APRN as PCP - General  Samantha Zabala APRN as PCP - Family Medicine  Jose G Rm MD as Consulting Physician (Cardiology)    Subjective .  Feeling much better after dialysis was started.  Looking better.    ROS  Since I have last seen, the patient has been without any chest discomfort ,shortness of breath, palpitations, dizziness or syncope.  Denies having any headache ,abdominal pain ,nausea, vomiting , diarrhea constipation, loss of weight or loss of appetite.  Denies having any excessive bruising ,hematuria or blood in the stool.    Review of all systems negative except as indicated.    Reviewed ROS.  History  Past Medical History:   Diagnosis Date   • A-fib (CMS/[facility]) 8/3/2021   • Appetite loss    • Arthritis    • Brain bleed (CMS/[facility])    • Carpal tunnel syndrome on left    • CHF (congestive heart failure) (CMS/[facility])    • Chronic left-sided low back pain without sciatica 12/27/2017   • CKD (chronic kidney disease) stage 3, GFR 30-59 ml/min (CMS/[facility])    • Closed fracture of seventh thoracic vertebra (CMS/[facility]) 8/23/2020   • Cognitive communication deficit 12/1/2020   • COVID-19 2/19/2021   • Cytokine release syndrome, grade 1 5/24/2021   • Depression    • Diabetic neuropathy (CMS/[facility])    • DM2 (diabetes mellitus, type 2) (CMS/[facility])    • Hyperlipidemia    • Hypogonadism in male    • Nonsustained ventricular tachycardia (CMS/[facility]) 7/23/2021   • Obesity    • Paroxysmal atrial fibrillation (CMS/[facility]) 12/1/2020   • Sleep apnea    • Stroke (CMS/[facility])    • Unsteady gait        Past Surgical History:   Procedure Laterality Date   • ANGIOPLASTY      X2   • APPENDECTOMY     • CARDIAC CATHETERIZATION  11/13/2015   • CARDIAC CATHETERIZATION N/A 5/24/2021    Procedure: Left Heart Cath and coronary angiogram;   Surgeon: Jose G Rm MD;  Location:  [facility] C[facility]VAS[facility];  Service: Cardiovascular;  Laterality: N/A;   • CARDIAC CATHETERIZATION  5/24/2021    Procedure: Saphenous Vein Graft;  Surgeon: Jose G Rm MD;  Location:  [facility];  Service: Cardiovascular;;   • CARDIAC CATHETERIZATION N/A 5/24/2021    Procedure: Percutaneous Coronary Intervention;  Surgeon: Bernabe Zambrano MD;  Location:  [facility]AS[facility];  Service: Cardiology;  Laterality: N/A;   • CARDIAC CATHETERIZATION N/A 5/24/2021    Procedure: Stent NIELS coronary;  Surgeon: Bernabe Zambrano MD;  Location:  [facility];  Service: Cardiology;  Laterality: N/A;   • CARDIAC CATHETERIZATION N/A 5/26/2021    Procedure: Percutaneous Coronary Intervention;  Surgeon: Bernabe Zambrano MD;  Location:  [facility];  Service: Cardiovascular;  Laterality: N/A;   • CARDIAC CATHETERIZATION N/A 5/26/2021    Procedure: Stent NIELS bypass graft;  Surgeon: Bernabe Zambrano MD;  Location: [facility] CATH INVASIVE LOCATION;  Service: Cardiovascular;  Laterality: N/A;   • CARDIAC ELECTROPHYSIOLOGY PROCEDURE N/A 5/24/2021    Procedure: Temporary Pacemaker;  Surgeon: Bernabe Zambrano MD;  Location:  [facility]ATH INVASIVE LOCATION;  Service: Cardiology;  Laterality: N/A;   • CARDIAC ELECTROPHYSIOLOGY PROCEDURE N/A 5/26/2021    Procedure: Temporary Pacemaker;  Surgeon: Bernabe Zambrano MD;  Location: [facility] CATH INVASIVE LOCATION;  Service: Cardiovascular;  Laterality: N/A;   • CARPAL TUNNEL RELEASE Left 04/29/2018    carpal tunnel- lt hand// other hand surgeries    • CATARACT EXTRACTION, BILATERAL  2002    Dr. Lux Acosta   • CHOLECYSTECTOMY     • COLON RESECTION  2005   • CORONARY ANGIOPLASTY     • CORONARY ANGIOPLASTY WITH STENT PLACEMENT  11/13/2015    PTCA stent to proximal in stent and mid to distal lad   • CORONARY ANGIOPLASTY WITH STENT PLACEMENT  09/16/2016    PTCA stent to mid lad and stent to vein graft to marginal    • CORONARY ARTERY BYPASS GRAFT  2005    @ [facility]   • CYST REMOVAL      cyst removed from scrotum   • FOOT SURGERY Right 07/17/2018   • FOOT SURGERY Left 02/04/2019   • TOE AMPUTATION Right     right toe removed D/T infected cut that went to the bone       Family History   Problem Relation Age of Onset   • Heart disease Mother    • Heart disease Father    • Diabetes Sister    • Heart disease Sister    • Diabetes Brother    • Mental illness Brother        Social History     Tobacco Use   • Smoking status: Former Smoker     Packs/day: 1.00     Years: 60.00     Pack years: 60.00     Types: Cigarettes   • Smokeless tobacco: Never Used   • Tobacco comment: Patient quit smoking 11/2020   Vaping Use   • Vaping Use: Never used   Substance Use Topics   • Alcohol use: No   • Drug use: Yes     Frequency: 1.0 times per week     Types: Marijuana     Comment: socially        Medications Prior to Admission   Medication Sig Dispense Refill Last Dose   • albuterol sulfate  (90 Base) MCG/ACT inhaler Inhale 2 puffs Every 4 (Four) Hours As Needed.      • apixaban (ELIQUIS) 5 MG tablet tablet Take 5 mg by mouth Daily.      • aspirin 81 MG chewable tablet Chew 81 mg Daily.      • atorvastatin (LIPITOR) 40 MG tablet Take 40 mg by mouth Every Night.      • cholecalciferol (VITAMIN D3) 25 MCG (1000 UT) tablet Take 1,000 Units by mouth Daily.      • dextrose (GLUTOSE) 40 % gel Take  by mouth Every 1 (One) Hour As Needed for Low Blood Sugar.      • donepezil (ARICEPT) 10 MG tablet Take 10 mg by mouth Every Night.      • DULoxetine HCl (DRIZALMA) 60 MG capsule Take 60 mg by mouth Daily.      • gabapentin (NEURONTIN) 100 MG capsule Take 2 capsules by mouth 2 (two) times a day. 6 capsule 0    • hydrALAZINE (APRESOLINE) 50 MG tablet Take 50 mg by mouth 2 (two) times a day. Hold for SBP <110      • isosorbide mononitrate (IMDUR) 60 MG 24 hr tablet Take 60 mg by mouth 2 (Two) Times a Day.      • metoprolol succinate XL (TOPROL-XL) 100 MG 24 hr  tablet Take 1 tablet by mouth Daily for 30 days. 30 tablet 0    • nitroglycerin (NITROSTAT) 0.4 MG SL tablet Place 1 tablet under the tongue Every 5 (Five) Minutes As Needed for Chest Pain (Only if SBP Greater Than 100). Take no more than 3 doses in 15 minutes. 30 tablet 12    • spironolactone (ALDACTONE) 25 MG tablet Take 1 tablet by mouth Daily for 30 days. 30 tablet 0    • tiotropium (Spiriva HandiHaler) 18 MCG per inhalation capsule Place 1 capsule into inhaler and inhale Daily. 30 capsule 1    • vitamin B-12 (CYANOCOBALAMIN) 1000 MCG tablet Take 1,000 mcg by mouth Daily.      • [DISCONTINUED] furosemide (LASIX) 40 MG tablet Take 1 tablet by mouth 2 (Two) Times a Day for 30 days. 60 tablet 0        Allergies  Codeine    Scheduled Meds:amiodarone, 200 mg, Oral, Q12H  aspirin, 81 mg, Oral, Daily  atorvastatin, 40 mg, Oral, Nightly  budesonide, 0.5 mg, Nebulization, BID - RT  calcium acetate, 667 mg, Oral, TID With Meals  carvedilol, 12.5 mg, Oral, Q12H  cholecalciferol, 1,000 Units, Oral, Daily  dilTIAZem CD, 240 mg, Oral, Q24H  donepezil, 10 mg, Oral, Nightly  DULoxetine, 60 mg, Oral, Daily  epoetin izabella-epbx, 20,000 Units, Subcutaneous, Weekly  insulin lispro, 0-24 Units, Subcutaneous, 4x Daily With Meals & Nightly  ipratropium-albuterol, 3 mL, Nebulization, Q4H - RT  isosorbide mononitrate, 60 mg, Oral, BID - Nitrates  polyethylene glycol, 17 g, Oral, Daily  senna-docusate sodium, 2 tablet, Oral, BID  sodium chloride, 10 mL, Intravenous, Q12H  vitamin B-12, 1,000 mcg, Oral, Daily      Continuous Infusions:hold, 1 each      PRN Meds:.•  acetaminophen **OR** acetaminophen **OR** acetaminophen  •  aluminum-magnesium hydroxide-simethicone  •  dextrose  •  dextrose  •  glucagon (human recombinant)  •  heparin (porcine)  •  hold  •  insulin lispro **AND** insulin lispro  •  melatonin  •  nitroglycerin  •  ondansetron **OR** ondansetron  •  [COMPLETED] Insert peripheral IV **AND** sodium chloride  •  sodium  "chloride    Objective     VITAL SIGNS  Vitals:    08/23/21 2231 08/23/21 2236 08/23/21 2253 08/24/21 0250   BP:   90/45 106/47   BP Location:   Left arm Left arm   Patient Position:   Lying Lying   Pulse: 55 55 56 59   Resp: 20 20 20 14   Temp:   99.2 °F (37.3 °C) 99.8 °F (37.7 °C)   TempSrc:   Oral Oral   SpO2: 95% 97% 100% 96%   Weight:       Height:           Flowsheet Rows      First Filed Value   Admission Height  180.3 cm (71\") Documented at 08/03/2021 1733   Admission Weight  113 kg (250 lb) Documented at 08/03/2021 1733            Intake/Output Summary (Last 24 hours) at 8/24/2021 0641  Last data filed at 8/24/2021 0200  Gross per 24 hour   Intake --   Output 3030 ml   Net -3030 ml        TELEMETRY: Atrial fibrillation with controlled ventricular response.    Physical Exam:  The patient is awake and alert and not in distress.  Vital signs as noted above.  Exogenous obesity.  Head and neck revealed no carotid bruits or jugular venous distention.  No thyromegaly or lymphadenopathy is present  Lungs clear.  No wheezing.  Breath sounds are normal bilaterally.  Heart normal first and second heart sounds.  No murmur. No precordial rub is present.  No gallop is present.  Abdomen soft and nontender.  No organomegaly is present.  Extremities with good peripheral pulses without any pedal edema.  Skin warm and dry. Permacath in place.  Musculoskeletal system is grossly normal  CNS-grossly normal.  Results Review:   I reviewed the patient's new clinical results.  Lab Results (last 24 hours)     Procedure Component Value Units Date/Time    Renal Function Panel [[id number]]  (Abnormal) Collected: 08/24/21 0435    Specimen: Blood Updated: 08/24/21 0556     Glucose 164 mg/dL      BUN 39 mg/dL      Creatinine 3.25 mg/dL      Sodium 131 mmol/L      Potassium 4.8 mmol/L      Chloride 92 mmol/L      CO2 26.0 mmol/L      Calcium 8.8 mg/dL      Albumin 3.80 g/dL      Phosphorus 6.3 mg/dL      Anion Gap 13.0 mmol/L      " BUN/Creatinine Ratio 12.0     eGFR Non African Amer 19 mL/min/1.73     Narrative:      GFR Normal >60  Chronic Kidney Disease <60  Kidney Failure <15      CBC & Differential [[id number]]  (Abnormal) Collected: 08/24/21 0435    Specimen: Blood Updated: 08/24/21 0524    Narrative:      The following orders were created for panel order CBC & Differential.  Procedure                               Abnormality         Status                     ---------                               -----------         ------                     CBC Auto Differential[[id number]]        Abnormal            Final result                 Please view results for these tests on the individual orders.    CBC Auto Differential [[id number]]  (Abnormal) Collected: 08/24/21 0435    Specimen: Blood Updated: 08/24/21 0524     WBC 9.20 10*3/mm3      RBC 3.43 10*6/mm3      Hemoglobin 9.8 g/dL      Hematocrit 29.6 %      MCV 86.2 fL      MCH 28.5 pg      MCHC 33.1 g/dL      RDW 16.2 %      RDW-SD 49.4 fl      MPV 7.5 fL      Platelets 403 10*3/mm3      Neutrophil % 67.8 %      Lymphocyte % 13.3 %      Monocyte % 12.4 %      Eosinophil % 5.3 %      Basophil % 1.2 %      Neutrophils, Absolute 6.20 10*3/mm3      Lymphocytes, Absolute 1.20 10*3/mm3      Monocytes, Absolute 1.10 10*3/mm3      Eosinophils, Absolute 0.50 10*3/mm3      Basophils, Absolute 0.10 10*3/mm3      nRBC 0.1 /100 WBC     POC Glucose Once [[id number]]  (Abnormal) Collected: 08/23/21 2048    Specimen: Blood Updated: 08/23/21 2049     Glucose 210 mg/dL      Comment: Serial Number: [id number]:  [id number]       POC Glucose Once [[id number]]  (Abnormal) Collected: 08/23/21 1631    Specimen: Blood Updated: 08/23/21 1633     Glucose 213 mg/dL      Comment: Serial Number: [id number]:  [id number]             Imaging Results (Last 24 Hours)     Procedure Component Value Units Date/Time    IR Insert Tunneled CV Catheter Without Port 5 Plus [[id number]] Collected: 08/23/21 1527     Updated:  08/23/21 1530    Narrative:      DATE OF EXAM:  8/23/2021 2:04 PM     PROCEDURE:  IR INS TUNNELED CV CATHETER WO PORT 5 PLUS-     INDICATIONS:  Change Shiley to PermCath for ongoing HD; R00.0-Tachycardia,  unspecified; R06.00-Dyspnea, unspecified     COMPARISON:  No Comparisons Available     FLUOROSCOPIC TIME:  21 seconds     PHYSICIAN MONITORED CONSCIOUS SEDATION TIME:  12 minutes     TECHNIQUE:   After informed consent was obtained a timeout was performed. The  interventional radiology nurse monitored the patient at all times and  provided conscious sedation. The patient was placed supine on the  fluoroscopy table and the right neck and chest were prepped and draped  using maximal sterile barrier technique. The skin and subcutaneous  tissues were anesthetized with 1% lidocaine. A small skin incision was  made. Next, under direct ultrasound guidance access was obtained to the  right internal jugular vein with a micropuncture kit. Ultrasound  demonstrated patent right internal jugular vein A guidewire was  manipulated centrally under fluoroscopic guidance.     Next, an appropriate exit site was marked, anesthetized and a small skin  incision was made. The PermCath was then tunneled from the exit site to  the access site.     The access site was then serially dilated to accommodate a peel-away  sheath. The catheter was then placed through the peel-away sheath and  the peel-away sheath and wire were removed. Fluoroscopy confirmed  catheter tip in satisfactory position. Both ports aspirated and flushed  normally and were locked with heparinized saline solution. The catheter  was secured to the skin with Prolene. The access site was closed with  Dermabond and Steri-Strips. The patient tolerated the procedure well  without immediate complication.     FINDINGS:  See above        Impression:      Successful placement of right IJ PermCath.     Electronically Signed By-Niels Brooks MD On:8/23/2021 3:28 PM  This report was  finalized on [id number] by  Niels Brooks MD.      LAB RESULTS (LAST 7 DAYS)    CBC  Results from last 7 days   Lab Units 08/24/21  0435 08/23/21  0544 08/22/21  0524 08/21/21  0613 08/20/21 0227 08/19/21 0613 08/18/21  0607   WBC 10*3/mm3 9.20 9.00 8.40 9.00 10.70 10.70 10.00   RBC 10*6/mm3 3.43* 3.01* 3.02* 3.16* 3.24* 3.16* 3.00*   HEMOGLOBIN g/dL 9.8* 8.5* 8.7* 9.0* 9.2* 9.4* 8.6*   HEMATOCRIT % 29.6* 25.8* 26.0* 27.3* 28.0* 27.7* 26.0*   MCV fL 86.2 85.8 86.0 86.4 86.4 87.6 86.5   PLATELETS 10*3/mm3 403 446 425 426 455* 429 383       BMP  Results from last 7 days   Lab Units 08/24/21  0435 08/23/21  0544 08/22/21  0524 08/21/21  0613 08/20/21 0227 08/19/21 0613 08/18/21  0607   SODIUM mmol/L 131* 130* 132* 133* 133* 134* 137   POTASSIUM mmol/L 4.8 5.3* 5.0 4.8 4.3 3.7 4.3   CHLORIDE mmol/L 92* 92* 93* 94* 93* 94* 97*   CO2 mmol/L 26.0 26.0 27.0 29.0 29.0 31.0* 28.0   BUN mg/dL 39* 59* 46* 29* 37* 23 49*   CREATININE mg/dL 3.25* 4.15* 3.37* 2.57* 2.52* 1.50* 2.48*   GLUCOSE mg/dL 164* 166* 186* 185* 181* 198* 149*   MAGNESIUM mg/dL  --   --   --   --   --   --  1.9   PHOSPHORUS mg/dL 6.3* 6.3* 6.2* 4.1 4.0 2.4* 5.3*       CMP   Results from last 7 days   Lab Units 08/24/21  0435 08/23/21  0544 08/22/21  0524 08/21/21  0613 08/20/21  0227 08/19/21  0613 08/18/21  0607   SODIUM mmol/L 131* 130* 132* 133* 133* 134* 137   POTASSIUM mmol/L 4.8 5.3* 5.0 4.8 4.3 3.7 4.3   CHLORIDE mmol/L 92* 92* 93* 94* 93* 94* 97*   CO2 mmol/L 26.0 26.0 27.0 29.0 29.0 31.0* 28.0   BUN mg/dL 39* 59* 46* 29* 37* 23 49*   CREATININE mg/dL 3.25* 4.15* 3.37* 2.57* 2.52* 1.50* 2.48*   GLUCOSE mg/dL 164* 166* 186* 185* 181* 198* 149*   ALBUMIN g/dL 3.80 3.50 3.60 3.70 3.70 3.90 3.40*         BNP        TROPONIN        CoAg        Creatinine Clearance  Estimated Creatinine Clearance: 23.6 mL/min (A) (by C-G formula based on SCr of 3.25 mg/dL (H)).    ABG        Radiology  IR Insert Tunneled CV Catheter Without Port 5 Plus    Result  Date: 8/23/2021  Successful placement of right IJ PermCath.  Electronically Signed By-Niels Brooks MD On:8/23/2021 3:28 PM This report was finalized on [id number] by  Niels Brooks MD.              EKG              I personally viewed and interpreted the patient's EKG/Telemetry data: Atrial fibrillation  ECHOCARDIOGRAM:    Results for orders placed during the hospital encounter of 07/20/21    Adult Transthoracic Echo Complete With Contrast if Necessary Per Protocol    Interpretation Summary  · Estimated left ventricular EF = 30% Left ventricular systolic function is moderately decreased.    Occasions  Shortness of breath.    Technically satisfactory study.  Mitral valve is structurally normal.  Mild mitral regurgitation.  Tricuspid valve is structurally normal.  Aortic valve is structurally normal.  Pulmonic valve could not be well visualized.  No evidence for tricuspid or aortic regurgitation is seen by Doppler study.  Biatrial and biventricular enlargement is present.  Diffuse left ventricular hypocontractility with ejection fraction of 30%.  Atrial septum is intact.  Aorta is normal.  No pericardial effusion or intracardiac thrombus is seen.    Impression  Mild mitral regurgitation.  Significant biatrial and biventricular enlargement.  Diffuse left ventricular hypocontractility with ejection fraction of 30%.  No pericardial effusion or intracardiac thrombus is seen.          STRESS MYOVIEW:    Cardiolite (Tc-99m Sestamibi) stress test    CARDIAC CATHETERIZATION:            OTHER:         Assessment/Plan     Principal Problem:    Acute on chronic systolic heart failure (CMS/HCC)  Active Problems:    S/P CABG (coronary artery bypass graft)    Essential hypertension    Elevated troponin    ANNETTE treated with BiPAP    Major depressive disorder, recurrent, mild (CMS/HCC)    Mixed hyperlipidemia    History of cerebrovascular accident    Flaccid hemiplegia of right dominant side as late effect of cerebral infarction  (CMS/HCC)    Diabetic neuropathy (CMS/HCC)    Unspecified dementia with behavioral disturbance (CMS/HCC)    Coronary artery disease    Obesity    Vitamin D deficiency    Chronic venous hypertension (idiopathic) with ulcer and inflammation of bilateral lower extremity (CMS/HCC)    Chronic obstructive pulmonary disease, unspecified (CMS/HCC)    Chronic foot ulcer (CMS/HCC)    Paroxysmal atrial fibrillation (CMS/HCC)    Acute kidney injury (CMS/HCC)    Type 2 diabetes mellitus with hyperglycemia (CMS/HCC)    CKD (chronic kidney disease) stage 3, GFR 30-59 ml/min (CMS/HCC)    Anemia of renal disease    Acute respiratory failure with hypoxia and hypercapnia (CMS/HCC)      [[[[[[[[[[[[[[[[[[[[[[[[[    Impression  ==============  -Increased shortness of breath and palpitations     -Recent acute on chronic congestive heart failure.  Compensated at this time  Recent echocardiogram showed left ventricle dysfunction with ejection fraction of 35%.     -History of right-sided weakness with left MCA territory stroke.-Improved      -status post CABG 2005. status post stent to LAD 2009    Status post stent to LAD 11/13/2015  Status post stent to mid LAD and SVG to marginal branch 09/16/2016.  Status post stent to mid LAD 5/24/2021  Status post stent to SVG to RCA 5/26/2021     Cardiac catheterization 5/24/2021 revealed  Left ventricle angiogram was not performed due to pre-existing renal dysfunction.  Left main coronary artery normal.  Left anterior descending artery has mid segment lengthy 90% disease within the previously placed stent.  Distal left into descending artery has diffuse disease.  Circumflex coronary artery is totally occluded.  (Chronic)  Right coronary artery is chronically occluded.  SVG to marginal branch (jump graft to OM1 and OM 2) is totally occluded (new finding compared to 2015.  SVG to PDA has distal radiolucency.  However no definite obstructive disease is present.       -status post myocardial infarction  2000 and 2002 .      -history of intermittent atrial fibrillation-maintaining sinus rhythm     Transesophageal echocardiogram 11/30/2020.  Biatrial enlargement.  Smoking effect in the left atrium and left ventricle and left atrial appendage.  Mild mitral and aortic regurgitation.  Left ventricle enlargement with diffuse hypocontractility with ejection fraction of 35 to 40%.     Echocardiogram 11/27/2020 revealed left atrial enlargement left ventricle dysfunction with ejection fraction of 40%.  Negative bubble study.      -diabetes hypertension and sleep apnea in history of renal dysfunction .  Recent BUN/creatinine 38/1.36     -status post colon surgery (partial colectomy) appendectomy cholecystectomy right 4th toe removal and carpal tunnel surgery      -continued smoking 1 pack per day -abstinence from smoking      -allergy to codeine.  ============   Plan  ================  Atrial fibrillation-chronic  Rate is better controlled  Patient is on Coreg at 12.5 mg p.o. twice a day amiodarone and Cardizem.  Decrease amiodarone to 200 mg once a day since severity is better controlled.    Respiratory insufficiency.-Improved    ESRD  Patient is feeling much better after dialysis was started.  Status post permacath placement 8/23/2021    Anemia  Hemoglobin 6.9.  Hemoglobin 7.7-8/17/2021  8.6-8/18/2021  8.5-8/23/2021  9.8-8/24/2021    Status post CABG  Patient is not having any angina pectoris    Status post stent to LAD 5/24/2021.  Status post stent to SVG to RCA 5/26/2021.       Anticoagulation  Eliquis is on hold.  Patient had permacath placement.    Further plan will depend on patient's progress.   ]]]]]]]]]]]]]]]]]]]]]]           Jose G Rm MD  08/24/21  06:41 EDT

## 2021-08-24 NOTE — THERAPY RE-EVALUATION
Patient Name: Benson Mclean  : 1950    MRN: [id number]                              Today's Date: 2021       Admit Date: 8/3/2021    Visit Dx:     ICD-10-CM ICD-9-CM   1. Tachycardia  R00.0 785.0   2. Dyspnea, unspecified type  R06.00 786.09     Patient Active Problem List   Diagnosis   • S/P CABG (coronary artery bypass graft)   • Essential hypertension   • Elevated troponin   • Closed fracture of seventh thoracic vertebra (CMS/[facility])   • Status post coronary artery stent placement   • ANNETTE treated with BiPAP   • Major depressive disorder, recurrent, mild (CMS/[facility])   • Lymphadenopathy, mediastinal   • Mixed hyperlipidemia   • History of cerebrovascular accident   • History of amputation of lesser toe (CMS/[facility])   • Flaccid hemiplegia of right dominant side as late effect of cerebral infarction (CMS/[facility])   • Diabetic neuropathy (CMS/[facility])   • Unspecified dementia with behavioral disturbance (CMS/[facility])   • Coronary artery disease   • Constipation   • Obesity   • Vitamin D deficiency   • Chronic venous hypertension (idiopathic) with ulcer and inflammation of bilateral lower extremity (CMS/[facility])   • Chronic obstructive pulmonary disease, unspecified (CMS/[facility])   • Chronic foot ulcer (CMS/[facility])   • Chronic left-sided low back pain without sciatica   • Paroxysmal atrial fibrillation (CMS/[facility])   • Arthritis   • Acute kidney injury (CMS/[facility])   • Type 2 diabetes mellitus with hyperglycemia (CMS/[facility])   • Abnormal nuclear stress test   • CKD (chronic kidney disease) stage 3, GFR 30-59 ml/min (CMS/[facility])   • Acute on chronic systolic heart failure (CMS/[facility])   • Anemia of renal disease   • Acute respiratory failure with hypoxia and hypercapnia (CMS/[facility])     Past Medical History:   Diagnosis Date   • A-fib (CMS/[facility]) 8/3/2021   • Appetite loss    • Arthritis    • Brain bleed (CMS/[facility])    • Carpal tunnel syndrome on left    • CHF (congestive heart failure) (CMS/[facility])    • Chronic left-sided low back pain without sciatica  12/27/2017   • CKD (chronic kidney disease) stage 3, GFR 30-59 ml/min (CMS/[facility])    • Closed fracture of seventh thoracic vertebra (CMS/[facility]) 8/23/2020   • Cognitive communication deficit 12/1/2020   • COVID-19 2/19/2021   • Cytokine release syndrome, grade 1 5/24/2021   • Depression    • Diabetic neuropathy (CMS/[facility])    • DM2 (diabetes mellitus, type 2) (CMS/[facility])    • Hyperlipidemia    • Hypogonadism in male    • Nonsustained ventricular tachycardia (CMS/[facility]) 7/23/2021   • Obesity    • Paroxysmal atrial fibrillation (CMS/[facility]) 12/1/2020   • Sleep apnea    • Stroke (CMS/[facility])    • Unsteady gait      Past Surgical History:   Procedure Laterality Date   • ANGIOPLASTY      X2   • APPENDECTOMY     • CARDIAC CATHETERIZATION  11/13/2015   • CARDIAC CATHETERIZATION N/A 5/24/2021    Procedure: Left Heart Cath and coronary angiogram;  Surgeon: Jose G Rm MD;  Location: [facility] CATH INVASIVE LOCATION;  Service: Cardiovascular;  Laterality: N/A;   • CARDIAC CATHETERIZATION  5/24/2021    Procedure: Saphenous Vein Graft;  Surgeon: Jose G Rm MD;  Location: [facility] CATH INVASIVE LOCATION;  Service: Cardiovascular;;   • CARDIAC CATHETERIZATION N/A 5/24/2021    Procedure: Percutaneous Coronary Intervention;  Surgeon: Bernabe Zambrano MD;  Location:  [facility] CATH INVASIVE LOCATION;  Service: Cardiology;  Laterality: N/A;   • CARDIAC CATHETERIZATION N/A 5/24/2021    Procedure: Stent NIELS coronary;  Surgeon: Bernabe Zambrano MD;  Location: [facility] CATH INVASIVE LOCATION;  Service: Cardiology;  Laterality: N/A;   • CARDIAC CATHETERIZATION N/A 5/26/2021    Procedure: Percutaneous Coronary Intervention;  Surgeon: Bernabe Zambrano MD;  Location:  [facility] CATH INVASIVE LOCATION;  Service: Cardiovascular;  Laterality: N/A;   • CARDIAC CATHETERIZATION N/A 5/26/2021    Procedure: Stent NIELS bypass graft;  Surgeon: Bernabe Zambrano MD;  Location:  [facility] CATH INVASIVE LOCATION;  Service: Cardiovascular;  Laterality: N/A;   • CARDIAC ELECTROPHYSIOLOGY  PROCEDURE N/A 5/24/2021    Procedure: Temporary Pacemaker;  Surgeon: Bernabe Zambrano MD;  Location:  [facility] CATH INVASIVE LOCATION;  Service: Cardiology;  Laterality: N/A;   • CARDIAC ELECTROPHYSIOLOGY PROCEDURE N/A 5/26/2021    Procedure: Temporary Pacemaker;  Surgeon: Bernabe Zambrano MD;  Location:  [facility] CATH INVASIVE LOCATION;  Service: Cardiovascular;  Laterality: N/A;   • CARPAL TUNNEL RELEASE Left 04/29/2018    carpal tunnel- lt hand// other hand surgeries    • CATARACT EXTRACTION, BILATERAL  2002    Dr. Lux Acosta   • CHOLECYSTECTOMY     • COLON RESECTION  2005   • CORONARY ANGIOPLASTY     • CORONARY ANGIOPLASTY WITH STENT PLACEMENT  11/13/2015    PTCA stent to proximal in stent and mid to distal lad   • CORONARY ANGIOPLASTY WITH STENT PLACEMENT  09/16/2016    PTCA stent to mid lad and stent to vein graft to marginal   • CORONARY ARTERY BYPASS GRAFT  2005    @ [facility]   • CYST REMOVAL      cyst removed from scrotum   • FOOT SURGERY Right 07/17/2018   • FOOT SURGERY Left 02/04/2019   • TOE AMPUTATION Right     right toe removed D/T infected cut that went to the bone     General Information     Row Name 08/24/21 1051          Physical Therapy Time and Intention    Document Type  re-evaluation  -CR     Row Name 08/24/21 1051          General Information    Barriers to Rehab  medically complex  -CR     Row Name 08/24/21 1051          Safety Issues, Functional Mobility    Impairments Affecting Function (Mobility)  balance;strength;endurance/activity tolerance;pain  -CR       User Key  (r) = Recorded By, (t) = Taken By, (c) = Cosigned By    Initials Name Provider Type    CR Reyes, Carmela, PT Physical Therapist        Mobility     Row Name 08/24/21 1051          Bed Mobility    Supine-Sit [place] (Bed Mobility)  minimum assist (75% patient effort)  -CR     Row Name 08/24/21 1051          Sit-Stand Transfer    Sit-Stand [place] (Transfers)  contact guard  -CR     Assistive Device (Sit-Stand Transfers)  walker,  front-wheeled  -CR     Row Name 08/24/21 1051          Gait/Stairs (Locomotion)    [place] Level (Gait)  minimum assist (75% patient effort)  -CR     Assistive Device (Gait)  walker, front-wheeled  -CR     Distance in Feet (Gait)  40  -CR     Deviations/Abnormal Patterns (Gait)  gait speed decreased;base of support, wide;festinating/shuffling BLE in ER , L side >R  -CR       User Key  (r) = Recorded By, (t) = Taken By, (c) = Cosigned By    Initials Name Provider Type    CR Reyes, Carmela, PT Physical Therapist        Obj/Interventions     Row Name 08/24/21 1052          Range of Motion Comprehensive    Comment, General Range of Motion  Min limit B hip flexors  -CR     Row Name 08/24/21 1052          Strength Comprehensive (MMT)    Comment, General Manual Muscle Testing (MMT) Assessment  BLE grossly 3+/5  -CR     Row Name 08/24/21 1052          Balance    Balance Assessment  sitting static balance;standing static balance;standing dynamic balance  -CR     Static Sitting Balance  sitting, edge of bed;WFL  -CR     Static Standing Balance  WFL;supported  -CR     Dynamic Standing Balance  moderate impairment;supported  -CR     Comment, Balance  rw for standing support  -CR     Row Name 08/24/21 1052          Sensory Assessment (Somatosensory)    Sensory Assessment (Somatosensory)  sensation intact  -CR       User Key  (r) = Recorded By, (t) = Taken By, (c) = Cosigned By    Initials Name Provider Type    CR Reyes, Carmela, PT Physical Therapist        Goals/Plan    No documentation.       Clinical Impression     Row Name 08/24/21 1053          Pain    Additional Documentation  Pain Scale: Numbers Pre/Post-Treatment (Group)  -CR     Row Name 08/24/21 1053          Pain Scale: Numbers Pre/Post-Treatment    Pretreatment Pain Rating  5/10  -CR     Posttreatment Pain Rating  5/10  -CR     Row Name 08/24/21 1053          Plan of Care Review    Plan of Care Reviewed With  patient  -CR     Progress  improving  -CR     Outcome  Summary  This is re eval due to length of stay. Pt initially admitted on8/3 due to palpitations/tachycardia.Patient now with ESRD on dialysis. Patient has made good progress since initial assessment however has not achieved goals set. Pt still requiring min A for supine to sit transfer. CGA to come to standing using rw from elevated bed. Min A needed to ambulate using rw; gait slow with several episodes of post loss of balance that pt is able to correct at times.Pt lives with spouse who is unable to physically assist pt therefore pt will need short IP rehab for strength, balance and mobility training.  -CR     Row Name 08/24/21 1053          Therapy Assessment/Plan (PT)    Patient/Family Therapy Goals Statement (PT)  patient would like to be able to return home after rehab  -CR     Rehab Potential (PT)  good, to achieve stated therapy goals  -CR     Criteria for Skilled Interventions Met (PT)  skilled treatment is necessary;yes  -CR     Row Name 08/24/21 1053          Vital Signs    Pretreatment Heart Rate (beats/min)  55  -CR     Intratreatment Heart Rate (beats/min)  70  -CR     Pre SpO2 (%)  97  -CR     O2 Delivery Pre Treatment  nasal cannula 1l/nc  -CR     Intra SpO2 (%)  97  -CR     O2 Delivery Intra Treatment  room air  -CR     Post SpO2 (%)  98  -CR     O2 Delivery Post Treatment  room air  -CR     Pre Patient Position  Supine  -CR     Intra Patient Position  Sitting  -CR     Post Patient Position  Sitting  -CR     Row Name 08/24/21 1053          Positioning and Restraints    Pre-Treatment Position  in bed  -CR     Post Treatment Position  chair  -CR     In Chair  notified nsg;sitting;call light within reach;exit alarm on;encouraged to call for assist  -CR       User Key  (r) = Recorded By, (t) = Taken By, (c) = Cosigned By    Initials Name Provider Type    CR Reyes, Carmela, PT Physical Therapist        Outcome Measures     Row Name 08/24/21 1100          How much help from another person do you currently  need...    Turning from your back to your side while in flat bed without using bedrails?  3  -CR     Moving from lying on back to sitting on the side of a flat bed without bedrails?  3  -CR     Moving to and from a bed to a chair (including a wheelchair)?  3  -CR     Standing up from a chair using your arms (e.g., wheelchair, bedside chair)?  3  -CR     Climbing 3-5 steps with a railing?  1  -CR     To walk in hospital room?  3  -CR     AM-PAC 6 Clicks Score (PT)  16  -CR       User Key  (r) = Recorded By, (t) = Taken By, (c) = Cosigned By    Initials Name Provider Type    CR Reyes, Carmela, PT Physical Therapist                       Physical Therapy Education                 Title: PT OT SLP Therapies (Done)     Topic: Physical Therapy (Done)     Point: Mobility training (Done)     Learning Progress Summary           Patient Acceptance, E,TB, VU by  at 8/21/2021 0423    Acceptance, E,TB, VU by AM at 8/9/2021 1458                   Point: Home exercise program (Done)     Learning Progress Summary           Patient Acceptance, E,TB, VU by  at 8/21/2021 0423                   Point: Body mechanics (Done)     Learning Progress Summary           Patient Acceptance, E,TB, VU by  at 8/21/2021 0423                   Point: Precautions (Done)     Learning Progress Summary           Patient Acceptance, E,TB, VU by  at 8/21/2021 0423                               User Key     Initials Effective Dates Name Provider Type Discipline     07/10/20 -  Rikki Christiansen, RN Registered Nurse Nurse     05/10/21 -  Leonard Meng, PT Physical Therapist PT              PT Recommendation and Plan     Plan of Care Reviewed With: patient  Progress: improving  Outcome Summary: This is re eval due to length of stay. Pt initially admitted on8/3 due to palpitations/tachycardia.Patient now with ESRD on dialysis. Patient has made good progress since initial assessment however has not achieved goals set. Pt still requiring min A for  supine to sit transfer. CGA to come to standing using rw from elevated bed. Min A needed to ambulate using rw; gait slow with several episodes of post loss of balance that pt is able to correct at times.Pt lives with spouse who is unable to physically assist pt therefore pt will need short IP rehab for strength, balance and mobility training.     Time Calculation:   PT Charges     Row Name 08/24/21 1101             Time Calculation    Start Time  1024  -CR      Stop Time  1039  -CR      Time Calculation (min)  15 min  -CR      PT Received On  08/24/21  -CR      PT - Next Appointment  08/25/21  -CR      PT Goal Re-Cert Due Date  09/07/21  -CR         Time Calculation- PT    Total Timed Code Minutes- PT  0 minute(s)  -CR        User Key  (r) = Recorded By, (t) = Taken By, (c) = Cosigned By    Initials Name Provider Type    CR Reyes, Carmela, PT Physical Therapist        Therapy Charges for Today     Code Description Service Date Service Provider Modifiers Qty    [id number] HC PT RE-EVAL ESTABLISHED PLAN 2 8/24/2021 Reyes, Carmela, PT GP 1          PT G-Codes  Outcome Measure Options: AM-PAC 6 Clicks Basic Mobility (PT)  AM-PAC 6 Clicks Score (PT): 16    Carmela Reyes, PT  8/24/2021

## 2021-08-24 NOTE — PROGRESS NOTES
[facility] Medicine Services Daily Progress Note    Patient Name: Benson Mclean  : 1950  MRN: [id number]  Primary Care Physician:  Samantha Zabala APRN  Date of admission: 8/3/2021      Subjective      Chief Complaint: Shortness of breath.      Patient Reports     21: s/p HD yesterday.  Feels better.  On 65% FiO2.  Informed the patient that he is getting decreased insulin dose to minimize hypoglycemia.  21: Feels better.  Planned HD today.  On 50% FiO2.  21: Feels better.  21: OOB to chair.  No complaints.  Permacath to be placed today.  Case management working on arranging dialysis chair.  21: Constipation.  Ordered stool softeners.  Permacath scheduled for 21.  On 2L now.  At baseline uses wheelchair/walker.  Encouraged OOB to chair.  21:  + Bowel movement. On 2L.  Feels better.  Not on home oxygen.  Planned permacath tomorrow.       Patient had dialysis today  Had tunneled cath as well.  No new events or symptoms overnight        o2 much netter  No new sx    Review of Systems   All other systems reviewed and are negative.         Objective      Vitals:   Temp:  [97.5 °F (36.4 °C)-99.8 °F (37.7 °C)] 98 °F (36.7 °C)  Heart Rate:  [] 69  Resp:  [8-21] 10  BP: ()/(41-82) 104/41  Flow (L/min):  [1-2] 1    Physical Exam  Constitutional:       General: He is not in acute distress.  HENT:      Head: Normocephalic and atraumatic.      Nose: Nose normal.   Eyes:      General: No scleral icterus.     Extraocular Movements: Extraocular movements intact.      Pupils: Pupils are equal, round, and reactive to light.   Cardiovascular:      Rate and Rhythm: Normal rate and regular rhythm.   Pulmonary:      Effort: Pulmonary effort is normal.      Breath sounds: Examination of the right-lower field reveals decreased breath sounds. Examination of the left-lower field reveals decreased breath sounds. Decreased breath sounds present.    Abdominal:      General: Bowel sounds are normal.      Comments: Obese abdomen.   Musculoskeletal:      Cervical back: Normal range of motion.      Comments: Decreased range of motion hips   Lymphadenopathy:      Cervical: No cervical adenopathy.   Skin:     General: Skin is warm.      Findings: No rash.   Neurological:      Mental Status: He is alert.      Cranial Nerves: Cranial nerves are intact.   Psychiatric:         Mood and Affect: Mood normal.           Result Review    Result Review:  I have personally reviewed the results from the time of this admission to 8/24/2021 11:46 EDT and agree with these findings:  [x]  Laboratory  [x]  Microbiology  [x]  Radiology  []  EKG/Telemetry   [x]  Cardiology/Vascular   []  Pathology  [x]  Old records  []  Other:            Assessment/Plan      Brief Patient Summary:    70 years old male initially admitted to [facility]  for A. fib RVR and fluid overload and eventually transfer to ICU for worsening hypoxemia and eventual initiation on hemodialysis for worsening DILEEP.       [START ON 8/25/2021] amiodarone, 200 mg, Oral, Q24H  aspirin, 81 mg, Oral, Daily  atorvastatin, 40 mg, Oral, Nightly  budesonide, 0.5 mg, Nebulization, BID - RT  calcium acetate, 667 mg, Oral, TID With Meals  carvedilol, 12.5 mg, Oral, Q12H  cholecalciferol, 1,000 Units, Oral, Daily  dilTIAZem CD, 240 mg, Oral, Q24H  donepezil, 10 mg, Oral, Nightly  DULoxetine, 60 mg, Oral, Daily  epoetin izabella-epbx, 20,000 Units, Subcutaneous, Weekly  insulin lispro, 0-24 Units, Subcutaneous, 4x Daily With Meals & Nightly  ipratropium-albuterol, 3 mL, Nebulization, Q4H - RT  isosorbide mononitrate, 60 mg, Oral, BID - Nitrates  polyethylene glycol, 17 g, Oral, Daily  senna-docusate sodium, 2 tablet, Oral, BID  sodium chloride, 10 mL, Intravenous, Q12H  vitamin B-12, 1,000 mcg, Oral, Daily       hold, 1 each         Active Hospital Problems:  Active Hospital Problems    Diagnosis    • **Acute on chronic systolic heart failure  (CMS/[facility])    • Acute respiratory failure with hypoxia and hypercapnia (CMS/[facility])    • Anemia of renal disease    • Acute kidney injury (CMS/[facility])    • CKD (chronic kidney disease) stage 3, GFR 30-59 ml/min (CMS/[facility])    • Type 2 diabetes mellitus with hyperglycemia (CMS/[facility])    • Mixed hyperlipidemia    • Obesity    • History of cerebrovascular accident    • Chronic obstructive pulmonary disease, unspecified (CMS/[facility])    • Flaccid hemiplegia of right dominant side as late effect of cerebral infarction (CMS/[facility])    • Unspecified dementia with behavioral disturbance (CMS/[facility])    • Major depressive disorder, recurrent, mild (CMS/[facility])    • Paroxysmal atrial fibrillation (CMS/[facility])    • Elevated troponin    • Essential hypertension    • S/P CABG (coronary artery bypass graft)    • Chronic venous hypertension (idiopathic) with ulcer and inflammation of bilateral lower extremity (CMS/[facility])    • Chronic foot ulcer (CMS/[facility])    • Coronary artery disease    • Vitamin D deficiency    • ANNETTE treated with BiPAP    • Diabetic neuropathy (CMS/[facility])           Acute on chronic systolic heart failure   -Improved with hemodialysis and diuretics  -on  Coreg, aspirin and Imdur   -ACE-I/ARB contraindicated d/t DILEEP     Acute respiratory failure with hypoxia and hypercapnia: Improving  -Not on home oxygen  -secondary to fluid overload and reason for transfer to ICU on 8/14/2020  -Initially on precision flow now on 2L nasal cannula  -titrate O2 to keep sat >90%  -AVAPS PRN  -pulmonary following     Acute kidney injury, CKD (chronic kidney disease) stage III complicated with fluid overload (POA)  -nephrology following  -Right IJ Shiley inserted and hemodialysis initiated on 08/14/21  -Permacath 8/23/2021 and needs outpatient dialysis arranged  -Social work working on dialysis chair        Elevated troponin:  -Denies chest pain  -likely demand ischemia  -cardiology following     Paroxysmal atrial fibrillation with RVR (POA)  -continue amiodarone,  diltiazem CD, Coreg  -Eliquis held due to plans for permacath placement 8/23/2021  -Cardiology following     Coronary artery disease, S/P CABG / Essential hypertension, chronic / Mixed hyperlipidemia  -continue aspirin, Eliquis, statin, Bumex, carvedilol, diltiazem CD, hydralazine, Imdur, and metolazone  -monitor BP     ANNETTE:  -Claims to be compliant with CPAP at home  -currently using hospital AVAP     Major depressive disorder:  -continue Cymbalta      History of cerebrovascular accident  -Flaccid hemiplegia of right dominant side as late effect of cerebral infarction   -At baseline walks with a walker wheelchair at home  -fall precautions  -continue aspirin, Elqius, and statin      Type 2 diabetes mellitus with hyperglycemia complicated with Diabetic neuropathy   -A1c 7.4% on 07/21/21  -Continue ISS     Mild dementia with behavioral disturbance  -continue donepezil      Obesity  -BMI 37.39   -encourage lifestyle modifications      Vitamin D deficiency  -continue cholecalciferol      Chronic obstructive pulmonary disease:  - former smoker  -stable without exacerbation  -continue bronchodilators      Anemia of chronic disease   -on weekly Retacrit   -Monitor H&H    Disposition  D/C Plan: Return to [place]. Will require precert. No PASRR required. New [facility] OP HD referral started 8/18-pending chair time.      DVT prophylaxis: Resume Eliquis if okay with interventional radiologist.      CODE STATUS:    Code Status: CPR  Medical Interventions (Level of Support Prior to Arrest): Full      Disposition:  I expect patient to be discharged rehab.    This patient has been examined wearing appropriate Personal Protective Equipment and discussed with nursing. 08/24/21      Electronically signed by Du Zamorano MD, 08/24/21, 11:46 EDT.  [facility] Hospitalist Team

## 2021-08-24 NOTE — PROGRESS NOTES
Patient has not been wearing hospital unit much at all. Spoke with patient regarding to why he has not said he does not want to continue to use hospital bipap but wants to use home unit. Patient oxygen requirements are less ; 1L NC to room air sometimes.

## 2021-08-24 NOTE — THERAPY RE-EVALUATION
Patient Name: Benson Mclean  : 1950    MRN: [id number]                              Today's Date: 2021       Admit Date: 8/3/2021    Visit Dx:     ICD-10-CM ICD-9-CM   1. Tachycardia  R00.0 785.0   2. Dyspnea, unspecified type  R06.00 786.09     Patient Active Problem List   Diagnosis   • S/P CABG (coronary artery bypass graft)   • Essential hypertension   • Elevated troponin   • Closed fracture of seventh thoracic vertebra (CMS/[facility])   • Status post coronary artery stent placement   • ANNETTE treated with BiPAP   • Major depressive disorder, recurrent, mild (CMS/[facility])   • Lymphadenopathy, mediastinal   • Mixed hyperlipidemia   • History of cerebrovascular accident   • History of amputation of lesser toe (CMS/[facility])   • Flaccid hemiplegia of right dominant side as late effect of cerebral infarction (CMS/[facility])   • Diabetic neuropathy (CMS/[facility])   • Unspecified dementia with behavioral disturbance (CMS/[facility])   • Coronary artery disease   • Constipation   • Obesity   • Vitamin D deficiency   • Chronic venous hypertension (idiopathic) with ulcer and inflammation of bilateral lower extremity (CMS/[facility])   • Chronic obstructive pulmonary disease, unspecified (CMS/[facility])   • Chronic foot ulcer (CMS/[facility])   • Chronic left-sided low back pain without sciatica   • Paroxysmal atrial fibrillation (CMS/[facility])   • Arthritis   • Acute kidney injury (CMS/[facility])   • Type 2 diabetes mellitus with hyperglycemia (CMS/[facility])   • Abnormal nuclear stress test   • CKD (chronic kidney disease) stage 3, GFR 30-59 ml/min (CMS/[facility])   • Acute on chronic systolic heart failure (CMS/[facility])   • Anemia of renal disease   • Acute respiratory failure with hypoxia and hypercapnia (CMS/[facility])     Past Medical History:   Diagnosis Date   • A-fib (CMS/[facility]) 8/3/2021   • Appetite loss    • Arthritis    • Brain bleed (CMS/[facility])    • Carpal tunnel syndrome on left    • CHF (congestive heart failure) (CMS/[facility])    • Chronic left-sided low back pain without sciatica  12/27/2017   • CKD (chronic kidney disease) stage 3, GFR 30-59 ml/min (CMS/[facility])    • Closed fracture of seventh thoracic vertebra (CMS/[facility]) 8/23/2020   • Cognitive communication deficit 12/1/2020   • COVID-19 2/19/2021   • Cytokine release syndrome, grade 1 5/24/2021   • Depression    • Diabetic neuropathy (CMS/[facility])    • DM2 (diabetes mellitus, type 2) (CMS/[facility])    • Hyperlipidemia    • Hypogonadism in male    • Nonsustained ventricular tachycardia (CMS/[facility]) 7/23/2021   • Obesity    • Paroxysmal atrial fibrillation (CMS/[facility]) 12/1/2020   • Sleep apnea    • Stroke (CMS/[facility])    • Unsteady gait      Past Surgical History:   Procedure Laterality Date   • ANGIOPLASTY      X2   • APPENDECTOMY     • CARDIAC CATHETERIZATION  11/13/2015   • CARDIAC CATHETERIZATION N/A 5/24/2021    Procedure: Left Heart Cath and coronary angiogram;  Surgeon: Jose G Rm MD;  Location: [facility] CATH INVASIVE LOCATION;  Service: Cardiovascular;  Laterality: N/A;   • CARDIAC CATHETERIZATION  5/24/2021    Procedure: Saphenous Vein Graft;  Surgeon: Jose G Rm MD;  Location: [facility] CATH INVASIVE LOCATION;  Service: Cardiovascular;;   • CARDIAC CATHETERIZATION N/A 5/24/2021    Procedure: Percutaneous Coronary Intervention;  Surgeon: Bernabe Zambrano MD;  Location:  [facility] CATH INVASIVE LOCATION;  Service: Cardiology;  Laterality: N/A;   • CARDIAC CATHETERIZATION N/A 5/24/2021    Procedure: Stent NIELS coronary;  Surgeon: Bernabe Zambrano MD;  Location: [facility] CATH INVASIVE LOCATION;  Service: Cardiology;  Laterality: N/A;   • CARDIAC CATHETERIZATION N/A 5/26/2021    Procedure: Percutaneous Coronary Intervention;  Surgeon: Bernabe Zambrano MD;  Location:  [facility] CATH INVASIVE LOCATION;  Service: Cardiovascular;  Laterality: N/A;   • CARDIAC CATHETERIZATION N/A 5/26/2021    Procedure: Stent NIELS bypass graft;  Surgeon: Bernabe Zambrano MD;  Location:  [facility] CATH INVASIVE LOCATION;  Service: Cardiovascular;  Laterality: N/A;   • CARDIAC ELECTROPHYSIOLOGY  PROCEDURE N/A 5/24/2021    Procedure: Temporary Pacemaker;  Surgeon: Bernabe Zambrano MD;  Location:  [facility] CATH INVASIVE LOCATION;  Service: Cardiology;  Laterality: N/A;   • CARDIAC ELECTROPHYSIOLOGY PROCEDURE N/A 5/26/2021    Procedure: Temporary Pacemaker;  Surgeon: Bernabe Zambrano MD;  Location:  [facility] CATH INVASIVE LOCATION;  Service: Cardiovascular;  Laterality: N/A;   • CARPAL TUNNEL RELEASE Left 04/29/2018    carpal tunnel- lt hand// other hand surgeries    • CATARACT EXTRACTION, BILATERAL  2002    Dr. Lux Acosta   • CHOLECYSTECTOMY     • COLON RESECTION  2005   • CORONARY ANGIOPLASTY     • CORONARY ANGIOPLASTY WITH STENT PLACEMENT  11/13/2015    PTCA stent to proximal in stent and mid to distal lad   • CORONARY ANGIOPLASTY WITH STENT PLACEMENT  09/16/2016    PTCA stent to mid lad and stent to vein graft to marginal   • CORONARY ARTERY BYPASS GRAFT  2005    @ [facility]   • CYST REMOVAL      cyst removed from scrotum   • FOOT SURGERY Right 07/17/2018   • FOOT SURGERY Left 02/04/2019   • TOE AMPUTATION Right     right toe removed D/T infected cut that went to the bone     General Information     Row Name 08/24/21 1325          OT Time and Intention    Document Type  re-evaluation  -NA     Mode of Treatment  occupational therapy  -NA     Row Name 08/24/21 1325          General Information    Patient Profile Reviewed  yes  -NA     Existing Precautions/Restrictions  fall  -NA     Barriers to Rehab  medically complex;previous functional deficit  -NA     Row Name 08/24/21 1325          Home Main Entrance    Number of Stairs, Main Entrance  three  -NA     Row Name 08/24/21 1325          Cognition    Orientation Status (Cognition)  oriented x 4  -NA     Row Name 08/24/21 1325          Safety Issues, Functional Mobility    Safety Issues Affecting Function (Mobility)  safety precaution awareness  -NA     Impairments Affecting Function (Mobility)  balance;strength;endurance/activity tolerance;pain;shortness of  breath;sensation/sensory awareness  -       User Key  (r) = Recorded By, (t) = Taken By, (c) = Cosigned By    Initials Name Provider Type    NA Sharon Villeda OT Occupational Therapist          Mobility/ADL's     Row Name 08/24/21 1326          Bed Mobility    Bed Mobility  supine-sit  -NA     All Activities, [place] (Bed Mobility)  supervision  -NA     Assistive Device (Bed Mobility)  bed rails  -     Row Name 08/24/21 1326          Transfers    Transfers  sit-stand transfer;bed-chair transfer;toilet transfer  -     Bed-Chair [place] (Transfers)  contact guard;minimum assist (75% patient effort)  -     Assistive Device (Bed-Chair Transfers)  walker, front-wheeled  -     Sit-Stand [place] (Transfers)  contact guard  -     [place] Level (Toilet Transfer)  minimum assist (75% patient effort)  -     Assistive Device (Toilet Transfer)  grab bars/safety frame;walker, rolling platform;commode  -     Row Name 08/24/21 1326          Sit-Stand Transfer    Assistive Device (Sit-Stand Transfers)  walker, front-wheeled  -     Row Name 08/24/21 1326          Toilet Transfer    Type (Toilet Transfer)  sit-stand;stand-sit  -     Row Name 08/24/21 [facility]          Functional Mobility    Functional Mobility- Ind. Level  contact guard assist;verbal cues required;nonverbal cues required (demo/gesture)  -     Functional Mobility- Device  rolling walker  -     Row Name 08/24/21 1326          Activities of Daily Living    BADL Assessment/Intervention  toileting;lower body dressing;upper body dressing;feeding  -     Row Name 08/24/21 132          Lower Body Dressing Assessment/Training    [place] Level (Lower Body Dressing)  don;socks;maximum assist (25% patient effort)  -     Row Name 08/24/21 1326          Toileting Assessment/Training    [place] Level (Toileting)  minimum assist (75% patient effort)  -     Row Name 08/24/21 [facility]          Upper Body Dressing  Assessment/Training    [place] Level (Upper Body Dressing)  set up  -NA     Row Name 08/24/21 1326          Self-Feeding Assessment/Training    [place] Level (Feeding)  set up  -       User Key  (r) = Recorded By, (t) = Taken By, (c) = Cosigned By    Initials Name Provider Type    Sharon Johnston OT Occupational Therapist        Obj/Interventions     Row Name 08/24/21 1327          Sensory Assessment (Somatosensory)    Sensory Assessment (Somatosensory)  sensation intact  -NA     Row Name 08/24/21 1327          Vision Assessment/Intervention    Visual Impairment/Limitations  corrective lenses full-time  -NA     Row Name 08/24/21 1327          Range of Motion Comprehensive    General Range of Motion  bilateral upper extremity ROM WFL  -NA     Row Name 08/24/21 1327          Strength Comprehensive (MMT)    General Manual Muscle Testing (MMT) Assessment  upper extremity strength deficits identified  -NA     Row Name 08/24/21 1327          Upper Extremity (Manual Muscle Testing)    Comment, MMT: Upper Extremity  4/5  -       User Key  (r) = Recorded By, (t) = Taken By, (c) = Cosigned By    Initials Name Provider Type    Sharon Johnston OT Occupational Therapist        Goals/Plan     Row Name 08/24/21 1328          Transfer Goal 1 (OT)    Activity/Assistive Device (Transfer Goal 1, OT)  toilet  -NA     [place] Level/Cues Needed (Transfer Goal 1, OT)  standby assist  -NA     Time Frame (Transfer Goal 1, OT)  2 weeks  -NA     Progress/Outcome (Transfer Goal 1, OT)  goal met;goal revised this date  -NA     Row Name 08/24/21 1328          Dressing Goal 1 (OT)    Activity/Device (Dressing Goal 1, OT)  lower body dressing  -NA     [place]/Cues Needed (Dressing Goal 1, OT)  contact guard assist  -NA     Time Frame (Dressing Goal 1, OT)  2 weeks  -NA     Progress/Outcome (Dressing Goal 1, OT)  goal partially met;goal revised this date  -NA     Row Name 08/24/21 1328          Toileting Goal 1  (OT)    Activity/Device (Toileting Goal 1, OT)  toileting skills, all  -NA     [place] Level/Cues Needed (Toileting Goal 1, OT)  standby assist  -NA     Time Frame (Toileting Goal 1, OT)  2 weeks  -NA     Progress/Outcome (Toileting Goal 1, OT)  goal revised this date  -NA     Row Name 08/24/21 1328          Grooming Goal 1 (OT)    Activity/Device (Grooming Goal 1, OT)  grooming skills, all  -NA     [place] (Grooming Goal 1, OT)  contact guard assist  -NA     Time Frame (Grooming Goal 1, OT)  long term goal (LTG);2 weeks  -NA     Progress/Outcome (Grooming Goal 1, OT)  goal met  -NA     Row Name 08/24/21 1328          Therapy Assessment/Plan (OT)    Planned Therapy Interventions (OT)  activity tolerance training;patient/caregiver education/training;BADL retraining;functional balance retraining;neuromuscular control/coordination retraining;ROM/therapeutic exercise;occupation/activity based interventions;strengthening exercise;transfer/mobility retraining  -NA       User Key  (r) = Recorded By, (t) = Taken By, (c) = Cosigned By    Initials Name Provider Type    NA Sharon Villeda OT Occupational Therapist        Clinical Impression     Row Name 08/24/21 1327          Pain Assessment    Additional Documentation  Pain Scale: Numbers Pre/Post-Treatment (Group)  -NA     [facility] Name 08/24/21 1327          Pain Scale: Numbers Pre/Post-Treatment    Pretreatment Pain Rating  5/10  -NA     Posttreatment Pain Rating  5/10  -NA     Pain Location - Orientation  generalized  -NA     Row Name 08/24/21 1326          Plan of Care Review    Plan of Care Reviewed With  patient  -NA     Progress  improving  -NA     Outcome Summary  OT re-eval completed this date 2/2 LOS and goals expiring. Pt is a 71 y/o M initially admitted for SOA, tachycardia, chest pain, weakness. 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 Prior to admission to the hospital, pt staying at SNF for stroke and difficulty walking. Pt now with dialysis  cath placement 8/23 with ESRD and on HD. Pt has made good progress toward OT goals and is now min a- CGA with RW for func mob, max A for LB dressing, min A for toileting, increased BUE MMT score to at least 4/5. OT POC updated. REC back to [place] rehab at [place] for short term rehab and then most likely senior living placement.  -NA     Row Name 08/24/21 1327          Therapy Assessment/Plan (OT)    Rehab Potential (OT)  good, to achieve stated therapy goals  -NA     Criteria for Skilled Therapeutic Interventions Met (OT)  yes;meets criteria;skilled treatment is necessary  -NA     Therapy Frequency (OT)  5 times/wk  -NA     Row Name 08/24/21 1327          Vital Signs    O2 Delivery Pre Treatment  nasal cannula  -NA     O2 Delivery Intra Treatment  room air  -NA     O2 Delivery Post Treatment  room air  -NA     Pre Patient Position  Supine  -NA     Intra Patient Position  Sitting  -NA     Post Patient Position  Sitting  -NA     Rest Breaks   1  -NA     Row Name 08/24/21 1327          Positioning and Restraints    Pre-Treatment Position  in bed  -NA     Post Treatment Position  chair  -NA     In Chair  notified nsg;reclined;call light within reach;encouraged to call for assist;exit alarm on  -NA       User Key  (r) = Recorded By, (t) = Taken By, (c) = Cosigned By    Initials Name Provider Type    NA Sharon Villeda OT Occupational Therapist        Outcome Measures     Row Name 08/24/21 1329          How much help from another is currently needed...    Putting on and taking off regular lower body clothing?  2  -NA     Bathing (including washing, rinsing, and drying)  3  -NA     Toileting (which includes using toilet bed pan or urinal)  3  -NA     Putting on and taking off regular upper body clothing  3  -NA     Taking care of personal grooming (such as brushing teeth)  4  -NA     Eating meals  4  -NA     AM-PAC 6 Clicks Score (OT)  19  -NA     Row Name 08/24/21 1100          How much help from another person do you currently need...     Turning from your back to your side while in flat bed without using bedrails?  3  -CR     Moving from lying on back to sitting on the side of a flat bed without bedrails?  3  -CR     Moving to and from a bed to a chair (including a wheelchair)?  3  -CR     Standing up from a chair using your arms (e.g., wheelchair, bedside chair)?  3  -CR     Climbing 3-5 steps with a railing?  1  -CR     To walk in hospital room?  3  -CR     AM-PAC 6 Clicks Score (PT)  16  -CR     Row Name 08/24/21 1329          Modified Fredy Scale    Pre-Stroke Modified Fredy Scale  6 - Unable to determine (UTD) from the medical record documentation  -NA     Modified Fredy Scale  4 - Moderately severe disability.  Unable to walk without assistance, and unable to attend to own bodily needs without assistance.  -NA     Row Name 08/24/21 1329          Functional Assessment    Outcome Measure Options  AM-PAC 6 Clicks Basic Mobility (PT);AM-PAC 6 Clicks Daily Activity (OT);Modified [place]  -NA       User Key  (r) = Recorded By, (t) = Taken By, (c) = Cosigned By    Initials Name Provider Type    Sharon Johnston, OT Occupational Therapist    CR Reyes, Carmela, PT Physical Therapist          Occupational Therapy Education                 Title: PT OT SLP Therapies (Done)     Topic: Occupational Therapy (Done)     Point: ADL training (Done)     Description:   Instruct learner(s) on proper safety adaptation and remediation techniques during self care or transfers.   Instruct in proper use of assistive devices.              Learning Progress Summary           Patient Acceptance, E,TB,D, VU,NR by NA at 8/24/2021 1333    Comment: transfer training, ADL training, using call light, OT POC, DC rec    Acceptance, E,TB, VU by MW at 8/21/2021 0423    Acceptance, E,TB, VU by BL at 8/10/2021 1119                   Point: Home exercise program (Done)     Description:   Instruct learner(s) on appropriate technique for monitoring, assisting and/or progressing  therapeutic exercises/activities.              Learning Progress Summary           Patient Acceptance, E,TB,D, VU,NR by NA at 8/24/2021 1333    Comment: transfer training, ADL training, using call light, OT POC, DC rec    Acceptance, E,TB, VU by  at 8/21/2021 0423                   Point: Precautions (Done)     Description:   Instruct learner(s) on prescribed precautions during self-care and functional transfers.              Learning Progress Summary           Patient Acceptance, E,TB,D, VU,NR by NA at 8/24/2021 1333    Comment: transfer training, ADL training, using call light, OT POC, DC rec    Acceptance, E,TB, VU by  at 8/21/2021 0423                   Point: Body mechanics (Done)     Description:   Instruct learner(s) on proper positioning and spine alignment during self-care, functional mobility activities and/or exercises.              Learning Progress Summary           Patient Acceptance, E,TB,D, VU,NR by NA at 8/24/2021 1333    Comment: transfer training, ADL training, using call light, OT POC, DC rec    Acceptance, E,TB, VU by  at 8/21/2021 0423    Acceptance, E,TB, VU by  at 8/13/2021 1703                               User Key     Initials Effective Dates Name Provider Type Discipline     09/30/20 -  Sharon Villeda OT Occupational Therapist OT     06/16/21 -  Yash Thapa OT Occupational Therapist OT     07/10/20 -  Rikki Christiansen, RN Registered Nurse Nurse     04/13/21 -  Loli Constantino OT Occupational Therapist OT              OT Recommendation and Plan  Planned Therapy Interventions (OT): activity tolerance training, patient/caregiver education/training, BADL retraining, functional balance retraining, neuromuscular control/coordination retraining, ROM/therapeutic exercise, occupation/activity based interventions, strengthening exercise, transfer/mobility retraining  Therapy Frequency (OT): 5 times/wk  Plan of Care Review  Plan of Care Reviewed With: patient  Progress:  improving  Outcome Summary: OT re-eval completed this date 2/2 LOS and goals expiring. Pt is a 71 y/o M initially admitted for SOA, tachycardia, chest pain, weakness. 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 Prior to admission to the hospital, pt staying at SNF for stroke and difficulty walking. Pt now with dialysis cath placement 8/23 with ESRD and on HD. Pt has made good progress toward OT goals and is now min a- CGA with RW for func mob, max A for LB dressing, min A for toileting, increased BUE MMT score to at least 4/5. OT POC updated. REC back to IP rehab at [place] for short term rehab and then most likely [facility] placement.     Time Calculation:   Time Calculation- OT     Row Name 08/24/21 1333             Time Calculation- OT    OT Start Time  1020  -NA      OT Stop Time  1040  -NA      OT Time Calculation (min)  20 min  -NA      Total Timed Code Minutes- OT  20 minute(s)  -NA      OT Received On  08/24/21  -NA      OT - Next Appointment  08/25/21  -NA      OT Goal Re-Cert Due Date  09/07/21  -NA        User Key  (r) = Recorded By, (t) = Taken By, (c) = Cosigned By    Initials Name Provider Type    NA Sharon Villeda OT Occupational Therapist        Therapy Charges for Today     Code Description Service Date Service Provider Modifiers Qty    49990119236 HC OT RE-EVAL 2 8/24/2021 Sharon Villeda OT GO 1    [id number] HC OT SELF CARE/MGMT/TRAIN EA 15 MIN 8/24/2021 Sharon Villeda OT GO 1               Sharon Villeda OT  8/24/2021

## 2021-08-24 NOTE — PLAN OF CARE
Goal Outcome Evaluation:  Plan of Care Reviewed With: patient        Progress: improving     Patient sitting up in chair, on room air with no c/o SOA or oxygen desaturation, chair time setup for outpatient dialysis with [facility] on mon, wed, fri at 0615, awaiting precert for rehab placement, will continue to monitor.

## 2021-08-24 NOTE — PLAN OF CARE
Goal Outcome Evaluation:    OT re-eval completed this date 2/2 LOS and goals expiring. Pt is a 71 y/o M initially admitted for SOA, tachycardia, chest pain, weakness. 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 Prior to admission to the hospital, pt staying at SNF for stroke and difficulty walking. Pt now with dialysis cath placement 8/23 with ESRD and on HD. Pt has made good progress toward OT goals and is now min a- CGA with RW for func mob, max A for LB dressing, min A for toileting, increased BUE MMT score to at least 4/5. OT POC updated. REC back to IP rehab at [place] for short term rehab and then most likely assisted placement.     Sharon Villeda, OTD, OTR

## 2021-08-25 VITALS
HEIGHT: 67 IN | HEART RATE: 64 BPM | RESPIRATION RATE: 15 BRPM | OXYGEN SATURATION: 93 % | WEIGHT: 227.07 LBS | SYSTOLIC BLOOD PRESSURE: 105 MMHG | BODY MASS INDEX: 35.64 KG/M2 | TEMPERATURE: 99.1 F | DIASTOLIC BLOOD PRESSURE: 53 MMHG

## 2021-08-25 PROBLEM — N17.9 ACUTE KIDNEY INJURY (HCC): Status: RESOLVED | Noted: 2021-08-03 | Resolved: 2021-08-25

## 2021-08-25 PROBLEM — J96.01 ACUTE RESPIRATORY FAILURE WITH HYPOXIA AND HYPERCAPNIA (HCC): Status: RESOLVED | Noted: 2021-08-10 | Resolved: 2021-08-25

## 2021-08-25 PROBLEM — J96.02 ACUTE RESPIRATORY FAILURE WITH HYPOXIA AND HYPERCAPNIA (HCC): Status: RESOLVED | Noted: 2021-08-10 | Resolved: 2021-08-25

## 2021-08-25 PROBLEM — I50.23 ACUTE ON CHRONIC SYSTOLIC HEART FAILURE (HCC): Status: RESOLVED | Noted: 2021-07-23 | Resolved: 2021-08-25

## 2021-08-25 LAB
ALBUMIN SERPL-MCNC: 3.7 G/DL (ref 3.5–5.2)
ANION GAP SERPL CALCULATED.3IONS-SCNC: 14 MMOL/L (ref 5–15)
BASOPHILS # BLD MANUAL: 0.08 10*3/MM3 (ref 0–0.2)
BASOPHILS NFR BLD AUTO: 1 % (ref 0–1.5)
BUN SERPL-MCNC: 55 MG/DL (ref 8–23)
BUN/CREAT SERPL: 13.9 (ref 7–25)
CALCIUM SPEC-SCNC: 9.2 MG/DL (ref 8.6–10.5)
CHLORIDE SERPL-SCNC: 91 MMOL/L (ref 98–107)
CO2 SERPL-SCNC: 26 MMOL/L (ref 22–29)
CREAT SERPL-MCNC: 3.97 MG/DL (ref 0.76–1.27)
DEPRECATED RDW RBC AUTO: 50.8 FL (ref 37–54)
EOSINOPHIL # BLD MANUAL: 0.15 10*3/MM3 (ref 0–0.4)
EOSINOPHIL NFR BLD MANUAL: 2 % (ref 0.3–6.2)
ERYTHROCYTE [DISTWIDTH] IN BLOOD BY AUTOMATED COUNT: 16.8 % (ref 12.3–15.4)
GFR SERPL CREATININE-BSD FRML MDRD: 15 ML/MIN/1.73
GLUCOSE BLDC GLUCOMTR-MCNC: 205 MG/DL (ref 70–105)
GLUCOSE BLDC GLUCOMTR-MCNC: 295 MG/DL (ref 70–105)
GLUCOSE SERPL-MCNC: 182 MG/DL (ref 65–99)
HCT VFR BLD AUTO: 28 % (ref 37.5–51)
HGB BLD-MCNC: 9.3 G/DL (ref 13–17.7)
LYMPHOCYTES # BLD MANUAL: 1.6 10*3/MM3 (ref 0.7–3.1)
LYMPHOCYTES NFR BLD MANUAL: 21 % (ref 19.6–45.3)
LYMPHOCYTES NFR BLD MANUAL: 6 % (ref 5–12)
MCH RBC QN AUTO: 28.8 PG (ref 26.6–33)
MCHC RBC AUTO-ENTMCNC: 33.2 G/DL (ref 31.5–35.7)
MCV RBC AUTO: 87 FL (ref 79–97)
MONOCYTES # BLD AUTO: 0.46 10*3/MM3 (ref 0.1–0.9)
NEUTROPHILS # BLD AUTO: 5.32 10*3/MM3 (ref 1.7–7)
NEUTROPHILS NFR BLD MANUAL: 69 % (ref 42.7–76)
NEUTS BAND NFR BLD MANUAL: 1 % (ref 0–5)
PHOSPHATE SERPL-MCNC: 8.2 MG/DL (ref 2.5–4.5)
PLAT MORPH BLD: NORMAL
PLATELET # BLD AUTO: 377 10*3/MM3 (ref 140–450)
PMV BLD AUTO: 7.7 FL (ref 6–12)
POTASSIUM SERPL-SCNC: 5 MMOL/L (ref 3.5–5.2)
RBC # BLD AUTO: 3.22 10*6/MM3 (ref 4.14–5.8)
RBC MORPH BLD: NORMAL
SARS-COV-2 RNA PNL SPEC NAA+PROBE: NOT DETECTED
SCAN SLIDE: NORMAL
SODIUM SERPL-SCNC: 131 MMOL/L (ref 136–145)
WBC # BLD AUTO: 7.6 10*3/MM3 (ref 3.4–10.8)
WBC MORPH BLD: NORMAL

## 2021-08-25 PROCEDURE — 97116 GAIT TRAINING THERAPY: CPT

## 2021-08-25 PROCEDURE — 87635 SARS-COV-2 COVID-19 AMP PRB: CPT | Performed by: INTERNAL MEDICINE

## 2021-08-25 PROCEDURE — 85025 COMPLETE CBC W/AUTO DIFF WBC: CPT | Performed by: INTERNAL MEDICINE

## 2021-08-25 PROCEDURE — 99232 SBSQ HOSP IP/OBS MODERATE 35: CPT | Performed by: INTERNAL MEDICINE

## 2021-08-25 PROCEDURE — 82962 GLUCOSE BLOOD TEST: CPT

## 2021-08-25 PROCEDURE — 97535 SELF CARE MNGMENT TRAINING: CPT

## 2021-08-25 PROCEDURE — 99239 HOSP IP/OBS DSCHRG MGMT >30: CPT | Performed by: INTERNAL MEDICINE

## 2021-08-25 PROCEDURE — 94799 UNLISTED PULMONARY SVC/PX: CPT

## 2021-08-25 PROCEDURE — 97530 THERAPEUTIC ACTIVITIES: CPT

## 2021-08-25 PROCEDURE — 25010000002 HEPARIN (PORCINE) PER 1000 UNITS: Performed by: INTERNAL MEDICINE

## 2021-08-25 PROCEDURE — 85007 BL SMEAR W/DIFF WBC COUNT: CPT | Performed by: INTERNAL MEDICINE

## 2021-08-25 PROCEDURE — 63710000001 INSULIN LISPRO (HUMAN) PER 5 UNITS: Performed by: HOSPITALIST

## 2021-08-25 PROCEDURE — 5A1D70Z PERFORMANCE OF URINARY FILTRATION, INTERMITTENT, LESS THAN 6 HOURS PER DAY: ICD-10-PCS | Performed by: INTERNAL MEDICINE

## 2021-08-25 PROCEDURE — 80069 RENAL FUNCTION PANEL: CPT | Performed by: INTERNAL MEDICINE

## 2021-08-25 RX ORDER — CALCIUM ACETATE 667 MG/1
1334 CAPSULE ORAL
Status: DISCONTINUED | OUTPATIENT
Start: 2021-08-25 | End: 2021-08-25 | Stop reason: HOSPADM

## 2021-08-25 RX ORDER — POLYETHYLENE GLYCOL 3350 17 G/17G
17 POWDER, FOR SOLUTION ORAL DAILY
Start: 2021-08-25 | End: 2021-08-30

## 2021-08-25 RX ORDER — CARVEDILOL 12.5 MG/1
12.5 TABLET ORAL EVERY 12 HOURS SCHEDULED
Start: 2021-08-25 | End: 2021-08-30

## 2021-08-25 RX ORDER — AMIODARONE HYDROCHLORIDE 200 MG/1
200 TABLET ORAL
Start: 2021-08-25 | End: 2021-08-30

## 2021-08-25 RX ADMIN — INSULIN LISPRO 12 UNITS: 100 INJECTION, SOLUTION INTRAVENOUS; SUBCUTANEOUS at 12:36

## 2021-08-25 RX ADMIN — CARVEDILOL 12.5 MG: 6.25 TABLET, FILM COATED ORAL at 10:01

## 2021-08-25 RX ADMIN — CALCIUM ACETATE 1334 MG: 667 CAPSULE ORAL at 12:35

## 2021-08-25 RX ADMIN — ASPIRIN 81 MG: 81 TABLET, CHEWABLE ORAL at 10:01

## 2021-08-25 RX ADMIN — HEPARIN SODIUM 200 UNITS: 1000 INJECTION INTRAVENOUS; SUBCUTANEOUS at 06:26

## 2021-08-25 RX ADMIN — ISOSORBIDE MONONITRATE 60 MG: 60 TABLET, EXTENDED RELEASE ORAL at 10:01

## 2021-08-25 RX ADMIN — APIXABAN 5 MG: 5 TABLET, FILM COATED ORAL at 10:01

## 2021-08-25 RX ADMIN — IPRATROPIUM BROMIDE AND ALBUTEROL SULFATE 3 ML: 2.5; .5 SOLUTION RESPIRATORY (INHALATION) at 11:38

## 2021-08-25 RX ADMIN — Medication 1000 UNITS: at 10:01

## 2021-08-25 RX ADMIN — DULOXETINE 60 MG: 30 CAPSULE, DELAYED RELEASE ORAL at 10:01

## 2021-08-25 RX ADMIN — DOCUSATE SODIUM 50 MG AND SENNOSIDES 8.6 MG 2 TABLET: 8.6; 5 TABLET, FILM COATED ORAL at 10:01

## 2021-08-25 RX ADMIN — Medication 10 ML: at 09:45

## 2021-08-25 RX ADMIN — AMIODARONE HYDROCHLORIDE 200 MG: 200 TABLET ORAL at 10:01

## 2021-08-25 RX ADMIN — POLYETHYLENE GLYCOL 3350 17 G: 17 POWDER, FOR SOLUTION ORAL at 10:01

## 2021-08-25 RX ADMIN — DILTIAZEM HYDROCHLORIDE 240 MG: 240 CAPSULE, COATED, EXTENDED RELEASE ORAL at 10:01

## 2021-08-25 RX ADMIN — ACETAMINOPHEN 650 MG: 325 TABLET ORAL at 08:20

## 2021-08-25 RX ADMIN — CYANOCOBALAMIN TAB 1000 MCG 1000 MCG: 1000 TAB at 10:01

## 2021-08-25 NOTE — PLAN OF CARE
Problem: Arrhythmia/Dysrhythmia  Goal: Normalized Cardiac Rhythm  Outcome: Ongoing, Progressing     Problem: Fall Injury Risk  Goal: Absence of Fall and Fall-Related Injury  Outcome: Ongoing, Progressing  Intervention: Identify and Manage Contributors to Fall Injury Risk  Recent Flowsheet Documentation  Taken 8/25/2021 1245 by Umm Garcia RN  Self-Care Promotion: independence encouraged  Taken 8/25/2021 0925 by Umm Garcia RN  Medication Review/Management: medications reviewed  Self-Care Promotion: independence encouraged  Intervention: Promote Injury-Free Environment  Recent Flowsheet Documentation  Taken 8/25/2021 1245 by Umm Garcia RN  Safety Promotion/Fall Prevention:   assistive device/personal items within reach   clutter free environment maintained   fall prevention program maintained   nonskid shoes/slippers when out of bed   room organization consistent   safety round/check completed  Taken 8/25/2021 1200 by Umm Garcia RN  Safety Promotion/Fall Prevention: safety round/check completed  Taken 8/25/2021 1000 by Umm Garcia RN  Safety Promotion/Fall Prevention: safety round/check completed  Taken 8/25/2021 0925 by Umm Garcia RN  Safety Promotion/Fall Prevention:   assistive device/personal items within reach   clutter free environment maintained   fall prevention program maintained   nonskid shoes/slippers when out of bed   room organization consistent   safety round/check completed  Taken 8/25/2021 0900 by Umm Garcia RN  Safety Promotion/Fall Prevention: (Patient is at dialysis) patient off unit  Taken 8/25/2021 0800 by Umm Garcia RN  Safety Promotion/Fall Prevention: (Patient is at dialysis) patient off unit  Taken 8/25/2021 0700 by Umm Garcia RN  Safety Promotion/Fall Prevention: (Patient is at dialysis ) patient off unit     Problem: Adult Inpatient Plan of Care  Goal: Plan of Care Review  Outcome: Ongoing, Progressing  Goal:  Patient-Specific Goal (Individualized)  Outcome: Ongoing, Progressing  Goal: Absence of Hospital-Acquired Illness or Injury  Outcome: Ongoing, Progressing  Intervention: Identify and Manage Fall Risk  Recent Flowsheet Documentation  Taken 8/25/2021 1245 by Umm Garcia RN  Safety Promotion/Fall Prevention:   assistive device/personal items within reach   clutter free environment maintained   fall prevention program maintained   nonskid shoes/slippers when out of bed   room organization consistent   safety round/check completed  Taken 8/25/2021 1200 by Umm Garcia RN  Safety Promotion/Fall Prevention: safety round/check completed  Taken 8/25/2021 1000 by Umm Garcia RN  Safety Promotion/Fall Prevention: safety round/check completed  Taken 8/25/2021 0925 by Umm Garcia RN  Safety Promotion/Fall Prevention:   assistive device/personal items within reach   clutter free environment maintained   fall prevention program maintained   nonskid shoes/slippers when out of bed   room organization consistent   safety round/check completed  Taken 8/25/2021 0900 by Umm Garcia RN  Safety Promotion/Fall Prevention: (Patient is at dialysis) patient off unit  Taken 8/25/2021 0800 by Umm Garcia RN  Safety Promotion/Fall Prevention: (Patient is at dialysis) patient off unit  Taken 8/25/2021 0700 by Umm Garcia RN  Safety Promotion/Fall Prevention: (Patient is at dialysis ) patient off unit  Intervention: Prevent Skin Injury  Recent Flowsheet Documentation  Taken 8/25/2021 1245 by Umm Garcia RN  Body Position: position maintained  Skin Protection:   adhesive use limited   tubing/devices free from skin contact  Taken 8/25/2021 0925 by Umm Garcia RN  Body Position: position maintained  Skin Protection:   adhesive use limited   tubing/devices free from skin contact  Intervention: Prevent Infection  Recent Flowsheet Documentation  Taken 8/25/2021 1245 by Umm Garcia  RN  Infection Prevention: hand hygiene promoted  Taken 8/25/2021 0925 by Umm Garcia RN  Infection Prevention: hand hygiene promoted  Goal: Optimal Comfort and Wellbeing  Outcome: Ongoing, Progressing  Goal: Readiness for Transition of Care  Outcome: Ongoing, Progressing     Problem: Skin Injury Risk Increased  Goal: Skin Health and Integrity  Outcome: Ongoing, Progressing  Intervention: Optimize Skin Protection  Recent Flowsheet Documentation  Taken 8/25/2021 1245 by Umm Garcia RN  Pressure Reduction Techniques: frequent weight shift encouraged  Head of Bed (HOB): (Patient is sitting in the chair) other (see comments)  Pressure Reduction Devices: pressure-redistributing mattress utilized  Skin Protection:   adhesive use limited   tubing/devices free from skin contact  Taken 8/25/2021 0925 by Umm Garcia RN  Pressure Reduction Techniques: frequent weight shift encouraged  Head of Bed (HOB): (Patient is sitting in the chair) other (see comments)  Pressure Reduction Devices: pressure-redistributing mattress utilized  Skin Protection:   adhesive use limited   tubing/devices free from skin contact     Problem: Breathing Pattern Ineffective  Goal: Effective Breathing Pattern  Outcome: Ongoing, Progressing  Intervention: Promote Improved Breathing Pattern  Recent Flowsheet Documentation  Taken 8/25/2021 1245 by Umm Garcia RN  Head of Bed (HOB): (Patient is sitting in the chair) other (see comments)  Taken 8/25/2021 0925 by Umm Garcia RN  Head of Bed ([facility]): (Patient is sitting in the chair) other (see comments)     Problem: Noninvasive Ventilation Acute  Goal: Effective Unassisted Ventilation and Oxygenation  Outcome: Ongoing, Progressing     Problem: Fluid Volume Excess  Goal: Fluid Balance  Outcome: Ongoing, Progressing  Intervention: Monitor and Manage Hypervolemia  Recent Flowsheet Documentation  Taken 8/25/2021 1245 by mUm Garcia RN  Skin Protection:   adhesive use  limited   tubing/devices free from skin contact  Taken 8/25/2021 0925 by Umm Garcia RN  Skin Protection:   adhesive use limited   tubing/devices free from skin contact   Goal Outcome Evaluation:      Patient did not have any new tests or procedures today. The patient is on room air. The patient did not have any complaints. The patient is being discharged to [place]. Will continue to monitor.

## 2021-08-25 NOTE — PLAN OF CARE
Assessment: Benson Mclean presents with ADL impairments below baseline abilities which indicate the need for continued skilled intervention while inpatient. Pt required min A for oral hygiene in standing with UE support secondary to decreased balance. Pt able to tolerate ~5 minutes of standing before requiring seated rest breaks. Pt required cues to use RUE for ADLs secondary to R sided weakness. Tolerating session today without incident. Will continue to follow and progress as tolerated.     Plan/Recommendations:   Pt would benefit from Inpatient Rehabilitation placement at discharge from facility.   Pt desires Inpatient Rehabilitation placement at discharge. Pt cooperative; agreeable to therapeutic recommendations and plan of care.

## 2021-08-25 NOTE — THERAPY TREATMENT NOTE
Subjective: Pt agreeable to therapeutic plan of care.  Cognition: oriented to Person, Place, Time and Situation and awareness of deficits: fair awareness of deficits    Objective:     Bed Mobility: N/A or Not attempted.  Functional Transfers: Min-A and with rolling walker sit to stand 3x  Functional Ambulation: N/A or Not attempted.    Grooming: CGA and Min-A  ADL Position: supported standing  ADL Comments: Required UE support for oral hygiene     Upper Body Dressing: Min-A  ADL Position: supported sitting  ADL Comments: UBD    Pain: 0 VAS  Education: Provided education on importance of mobility and skilled verbal / tactile cueing throughout intervention.     Assessment: Benson Mclean presents with ADL impairments below baseline abilities which indicate the need for continued skilled intervention while inpatient. Pt required min A for oral hygiene in standing with UE support secondary to decreased balance. Pt able to tolerate ~5 minutes of standing before requiring seated rest breaks. Pt required cues to use RUE for ADLs secondary to R sided weakness. Tolerating session today without incident. Will continue to follow and progress as tolerated.     Plan/Recommendations:   Pt would benefit from Inpatient Rehabilitation placement at discharge from facility.   Pt desires Inpatient Rehabilitation placement at discharge. Pt cooperative; agreeable to therapeutic recommendations and plan of care.     Modified [place]: 4 = Moderately severe disability (Unable to attend to own bodily needs without assistance, and unable to walk unassisted)     Post-Tx Position: Up in Chair, Alarms activated and Call light and personal items within reach  PPE: gloves, surgical mask, eyewear protection

## 2021-08-25 NOTE — DISCHARGE SUMMARY
[facility] Medicine Services  DISCHARGE SUMMARY    Patient Name: Benson Mclean  : 1950  MRN: [id number]    Date of Admission: 8/3/2021  Date of Discharge: 21  Condition on discharge stable  Primary Care Physician: Samantha Zabala APRN      Presenting Problem:   A-fib (CMS/[facility]) [I48.91]  Tachycardia [R00.0]  Dyspnea, unspecified type [R06.00]  Acute respiratory failure (CMS/[facility]) [J96.00]    Active and Resolved Hospital Problems:  Active Hospital Problems    Diagnosis POA   • Anemia of renal disease [N18.9, D63.1] Yes   • CKD (chronic kidney disease) stage 3, GFR 30-59 ml/min (CMS/[facility]) [N18.30] Yes   • Type 2 diabetes mellitus with hyperglycemia (CMS/[facility]) [E11.65] Yes   • Mixed hyperlipidemia [E78.2] Yes   • Obesity [E66.9] Yes   • History of cerebrovascular accident [Z86.73] Not Applicable   • Chronic obstructive pulmonary disease, unspecified (CMS/[facility]) [J44.9] Yes   • Flaccid hemiplegia of right dominant side as late effect of cerebral infarction (CMS/[facility]) [I69.351] Not Applicable   • Unspecified dementia with behavioral disturbance (CMS/[facility]) [F03.91] Yes   • Major depressive disorder, recurrent, mild (CMS/[facility]) [F33.0] Yes   • Paroxysmal atrial fibrillation (CMS/[facility]) [I48.0] Yes   • Elevated troponin [R77.8] Yes   • Essential hypertension [I10] Yes   • S/P CABG (coronary artery bypass graft) [Z95.1] Not Applicable   • Chronic venous hypertension (idiopathic) with ulcer and inflammation of bilateral lower extremity (CMS/[facility]) [I87.333, L97.919, L97.929] Yes   • Chronic foot ulcer (CMS/[facility]) [L97.509] Yes   • Coronary artery disease [I25.10] Yes   • Vitamin D deficiency [E55.9] Yes   • ANNETTE treated with BiPAP [G47.33] Yes   • Diabetic neuropathy (CMS/[facility]) [E11.40] Yes      Resolved Hospital Problems    Diagnosis POA   • **Acute on chronic systolic heart failure (CMS/HCC) [I50.23] Yes   • Acute respiratory failure with hypoxia and hypercapnia (CMS/HCC) [J96.01, J96.02] No    • Acute kidney injury (CMS/[facility]) [N17.9] Yes   • Atrial fibrillation with RVR (CMS/[facility]) [I48.91] Yes      Acute on chronic systolic heart failure   -Improved with hemodialysis and diuretics  -on  Coreg, aspirin and Imdur   -ACE-I/ARB contraindicated d/t DILEEP     Acute respiratory failure with hypoxia and hypercapnia: Improving  -Not on home oxygen  -secondary to fluid overload and reason for transfer to ICU on 8/14/2020  -Initially on precision flow now on 2L nasal cannula  -titrate O2 to keep sat >90%  -AVAPS PRN  -pulmonary following     Acute kidney injury, CKD (chronic kidney disease) stage III complicated with fluid overload (POA)  -nephrology following  -Right IJ Shiley inserted and hemodialysis initiated on 08/14/21  -Permacath 8/23/2021 and needs outpatient dialysis arranged  -Social work working on dialysis chair        Elevated troponin:  -Denies chest pain  -likely demand ischemia  -cardiology following     Paroxysmal atrial fibrillation with RVR (POA)  -continue amiodarone, diltiazem CD, Coreg  -Restarted Eliquis after approval by interventional radiology/post permacath placement 8/23/2021  -Cardiology following     Coronary artery disease, S/P CABG / Essential hypertension, chronic / Mixed hyperlipidemia  -On aspirin and Eliquis and Coreg  -monitor BP     ANNETTE:  -Claims to be compliant with CPAP at home  -currently using hospital AVAP     Major depressive disorder:  -continue Cymbalta      History of cerebrovascular accident  -Flaccid hemiplegia of right dominant side as late effect of cerebral infarction   -At baseline walks with a walker wheelchair at home  -fall precautions  -continue aspirin, Elqius, and statin      Type 2 diabetes mellitus with hyperglycemia complicated with Diabetic neuropathy   -A1c 7.4% on 07/21/21  -Continue ISS     Mild dementia with behavioral disturbance  -continue donepezil      Obesity  -BMI 37.39   -encourage lifestyle modifications      Vitamin D deficiency  -continue  cholecalciferol      Chronic obstructive pulmonary disease:  - former smoker  -stable without exacerbation  -continue bronchodilators      Anemia of chronic disease   -on weekly Retacrit   -Monitor H&H     Disposition  D/C Plan: Return to [place]. Will require precert. No PASRR required. [facility] OP HD referral started 8/18-pending chair time.           Hospital Course     Hospital Course:  Benson Mclean is a 70 y.o. male 0    History of Present Illness:   Benson Mclean is a 70 y.o. male w/PMH of CHF, CKD, DM, HTN, HLD, CAD, COPD, A. fib, CABG, ANNETTE, CVA, neuropathy, obesity, depression, COVID-19 who presents to [facility] ED with palpitations, patient was discharged from this facility yesterday after a 12-day admission.  Patient states tachycardia progressively worsened after discharge from this facility yesterday, no aggravating or alleviating factors noted.  Patient admits to chest pain, nausea, weakness, palpitations, edema, dyspnea.  Patient denies fever, chills, nausea, vomiting.  As tachycardia did not improve he decided to go to [facility] ED.        Upon arrival to the ED vitals temp 97, HR 93, RR 22, /75, O2 sat 97% on 4 L nasal cannula.  Labs notable for troponin 0.117, proBNP 7648, glucose 198, , K4.5, CO2 32, creatinine 1.91, BUN 68, GFR 35, WBC 7.4, Hgb 8.0, platelets 334, neutrophils 65.9.  EKG shows tachycardia rate 126 repolarization abnormality suggest ischemia diffuse leads.  Chest x-ray shows stable cardiomegaly with pulmonary interstitial edema pattern trace bilateral pleural effusions.  Patient treated in the ED with 10 mg IV Cardizem, IV Cardizem drip initiated.     8/17/21: s/p HD yesterday.  Feels better.  On 65% FiO2.  Informed the patient that he is getting decreased insulin dose to minimize hypoglycemia.  8/18/21: Feels better.  Planned HD today.  On 50% FiO2.  8/19/21: Feels better.  8/20/21: OOB to chair.  No complaints.  Permacath to be  placed today.  Case management working on arranging dialysis chair.  8/21/21: Constipation.  Ordered stool softeners.  Permacath scheduled for Monday 8/23/21.  On 2L now.  At baseline uses wheelchair/walker.  Encouraged OOB to chair.  8/22/21:  + Bowel movement. On 2L.  Feels better.  Not on home oxygen.  Planned permacath tomorrow.     8/23  Patient had dialysis today  Had tunneled cath as well.  No new events or symptoms overnight        8/24  o2 much netter  No new sx        8/25  Attempted hemodialysis today without ultrafiltration secondary to low blood pressure being at his dry weight    DISCHARGE Follow Up Recommendations for labs and diagnostics:   Follow-up with cardiology nephrology a in 2 to 4 weeks  Follow-up with PCP 1 week        Reasons For Change In Medications and Indications for New Medications:      Day of Discharge     Vital Signs:  Temp:  [97.6 °F (36.4 °C)-98.7 °F (37.1 °C)] 98.3 °F (36.8 °C)  Heart Rate:  [] 78  Resp:  [10-18] 16  BP: ()/(41-54) 105/43  Flow (L/min):  [1-2] 1    Physical Exam:  Physical Exam *  Refer to progress note      Pertinent  and/or Most Recent Results     LAB RESULTS:      Lab 08/25/21  0344 08/24/21  0435 08/23/21  0544 08/22/21  0524 08/21/21  0613 08/20/21  0227 08/20/21  0227   WBC 7.60 9.20 9.00 8.40 9.00   < > 10.70   HEMOGLOBIN 9.3* 9.8* 8.5* 8.7* 9.0*   < > 9.2*   HEMATOCRIT 28.0* 29.6* 25.8* 26.0* 27.3*   < > 28.0*   PLATELETS 377 403 446 425 426   < > 455*   NEUTROS ABS 5.32 6.20 5.70 4.70 5.10   < > 6.40   LYMPHS ABS  --  1.20 1.70 1.90 1.80  --  2.40   MONOS ABS  --  1.10* 0.90 1.20* 1.30*  --  1.30*   EOS ABS 0.15 0.50* 0.60* 0.60* 0.60*   < > 0.50*   MCV 87.0 86.2 85.8 86.0 86.4   < > 86.4    < > = values in this interval not displayed.         Lab 08/25/21  0344 08/24/21  0435 08/23/21  0544 08/22/21  0524 08/21/21  0613   SODIUM 131* 131* 130* 132* 133*   POTASSIUM 5.0 4.8 5.3* 5.0 4.8   CHLORIDE 91* 92* 92* 93* 94*   CO2 26.0 26.0 26.0 27.0  29.0   ANION GAP 14.0 13.0 12.0 12.0 10.0   BUN 55* 39* 59* 46* 29*   CREATININE 3.97* 3.25* 4.15* 3.37* 2.57*   GLUCOSE 182* 164* 166* 186* 185*   CALCIUM 9.2 8.8 8.7 8.5* 8.6   PHOSPHORUS 8.2* 6.3* 6.3* 6.2* 4.1         Lab 08/25/21  0344 08/24/21  0435 08/23/21  0544 08/22/21  0524 08/21/21  0613   ALBUMIN 3.70 3.80 3.50 3.60 3.70                     Brief Urine Lab Results  (Last result in the past 365 days)      Color   Clarity   Blood   Leuk Est   Nitrite   Protein   CREAT   Urine HCG        07/28/21 1703             44.3       07/28/21 1703 Yellow Hazy  Comment:  Result checked  Negative Large (3+) Negative 30 mg/dL (1+)             Microbiology Results (last 10 days)     ** No results found for the last 240 hours. **          XR Chest 1 View    Result Date: 8/17/2021  Impression:  1. Continued findings of congestive heart failure and interstitial pulmonary edema. 2. Bilateral dependent pleural effusions. Bibasilar airspace disease.  Electronically Signed By-Oscar Casper MD On:8/17/2021 7:24 AM This report was finalized on [id number] by  Oscar Casper MD.    XR Chest 1 View    Result Date: 8/16/2021  Impression: 1. No significant change in the bibasilar airspace opacities and layering bilateral pleural effusions.  Electronically Signed By-Anselmo Vaz MD On:8/16/2021 8:17 AM This report was finalized on [id number] by  Anselmo Vaz MD.    XR Chest 1 View    Result Date: 8/14/2021  Impression: Right internal jugular CVC catheter with the tip in the proximal SVC. No pneumothorax. Bibasilar airspace disease is present with small bilateral pleural effusions which appears to have improved as compared to the previous study.  Electronically Signed By-Angelica Neves MD On:8/14/2021 5:25 PM This report was finalized on [id number] by  Angelica Neves MD.    XR Chest 1 View    Result Date: 8/14/2021  Impression: Worsening pulmonary edema and bilateral effusions. Electronically signed by:  Sal Nieto D.O.   8/14/2021 3:10 AM    XR Chest 1 View    Result Date: 8/11/2021  Impression:  1. Increasing interstitial and alveolar disease in both lungs compared to 8/3/2021 may represent worsening pulmonary edema. 2. Small bilateral pleural effusions, left greater than right, are thought to be similar to the prior exam. 3. Stable cardiomegaly.  Electronically Signed By-Demetria Jolley MD On:8/11/2021 8:29 AM This report was finalized on [id number] by  Demetria Jolley MD.    XR Chest 1 View    Result Date: 8/3/2021  Impression: 1. Cardiomegaly with pulmonary interstitial edema pattern and trace bilateral pleural effusions.  Electronically Signed By-Anselmo Vaz MD On:8/3/2021 7:00 PM This report was finalized on [id number] by  Anselmo Vaz MD.    IR Insert Tunneled CV Catheter Without Port 5 Plus    Result Date: 8/23/2021  Impression: Successful placement of right IJ PermCath.  Electronically Signed By-Niels Brooks MD On:8/23/2021 3:28 PM This report was finalized on [id number] by  Niels Brooks MD.              Results for orders placed during the hospital encounter of 07/20/21    Adult Transthoracic Echo Complete With Contrast if Necessary Per Protocol    Interpretation Summary  · Estimated left ventricular EF = 30% Left ventricular systolic function is moderately decreased.    Occasions  Shortness of breath.    Technically satisfactory study.  Mitral valve is structurally normal.  Mild mitral regurgitation.  Tricuspid valve is structurally normal.  Aortic valve is structurally normal.  Pulmonic valve could not be well visualized.  No evidence for tricuspid or aortic regurgitation is seen by Doppler study.  Biatrial and biventricular enlargement is present.  Diffuse left ventricular hypocontractility with ejection fraction of 30%.  Atrial septum is intact.  Aorta is normal.  No pericardial effusion or intracardiac thrombus is seen.    Impression  Mild mitral regurgitation.  Significant biatrial and  biventricular enlargement.  Diffuse left ventricular hypocontractility with ejection fraction of 30%.  No pericardial effusion or intracardiac thrombus is seen.      Labs Pending at Discharge:      Procedures Performed           Consults:   Consults     Date and Time Order Name Status Description    8/16/2021 10:01 AM Inpatient Hospitalist Consult      8/14/2021  4:35 AM Inpatient Intensivist Consult      8/10/2021  9:56 PM Inpatient Pulmonology Consult Completed     8/3/2021  9:34 PM Inpatient Nephrology Consult      8/3/2021  9:34 PM Inpatient Cardiology Consult Completed     7/21/2021  7:30 PM Inpatient Cardiology Consult Completed             Discharge Details        Discharge Medications      New Medications      Instructions Start Date   amiodarone 200 MG tablet  Commonly known as: PACERONE   200 mg, Oral, Every 24 Hours Scheduled      carvedilol 12.5 MG tablet  Commonly known as: COREG   12.5 mg, Oral, Every 12 Hours Scheduled      dilTIAZem  MG 24 hr capsule  Commonly known as: CARDIZEM CD   240 mg, Oral, Every 24 Hours Scheduled      polyethylene glycol 17 g packet  Commonly known as: MIRALAX   17 g, Oral, Daily         Changes to Medications      Instructions Start Date   apixaban 5 MG tablet tablet  Commonly known as: ELIQUIS  What changed: when to take this   5 mg, Oral, Every 12 Hours Scheduled         Continue These Medications      Instructions Start Date   albuterol sulfate  (90 Base) MCG/ACT inhaler  Commonly known as: PROVENTIL HFA;VENTOLIN HFA;PROAIR HFA   2 puffs, Inhalation, Every 4 Hours PRN      aspirin 81 MG chewable tablet   81 mg, Oral, Daily      atorvastatin 40 MG tablet  Commonly known as: LIPITOR   40 mg, Oral, Nightly      cholecalciferol 25 MCG (1000 UT) tablet  Commonly known as: VITAMIN D3   1,000 Units, Oral, Daily      dextrose 40 % gel  Commonly known as: GLUTOSE   Oral, Every 1 Hour PRN      donepezil 10 MG tablet  Commonly known as: ARICEPT   10 mg, Oral, Nightly       DULoxetine HCl 60 MG capsule  Commonly known as: DRIZALMA   60 mg, Oral, Daily      gabapentin 100 MG capsule  Commonly known as: NEURONTIN   200 mg, Oral, 2 times daily      isosorbide mononitrate 60 MG 24 hr tablet  Commonly known as: IMDUR   60 mg, Oral, 2 Times Daily      tiotropium 18 MCG per inhalation capsule  Commonly known as: Spiriva HandiHaler   1 capsule, Inhalation, Daily - RT      vitamin B-12 1000 MCG tablet  Commonly known as: CYANOCOBALAMIN   1,000 mcg, Oral, Daily         Stop These Medications    furosemide 40 MG tablet  Commonly known as: LASIX     hydrALAZINE 50 MG tablet  Commonly known as: APRESOLINE     metoprolol succinate  MG 24 hr tablet  Commonly known as: TOPROL-XL     nitroglycerin 0.4 MG SL tablet  Commonly known as: NITROSTAT     spironolactone 25 MG tablet  Commonly known as: ALDACTONE            Allergies   Allergen Reactions   • Codeine Itching         Discharge Disposition: [facility] (ID - External)    Diet:  Hospital:  Diet Order   Procedures   • Diet Regular, Renal; Low Phosphorus, 2gm K+; Fluid Restriction; 1200cc/day         Discharge Activity:   Activity Instructions     Activity as Tolerated              CODE STATUS:  Code Status and Medical Interventions:   Ordered at: 08/03/21 2134     Code Status:    CPR     Medical Interventions (Level of Support Prior to Arrest):    Full         Future Appointments   Date Time Provider Department Center   9/16/2021  1:00 PM HERI Chung DPM [facility]   1/3/2022  2:50 PM Jose G Rm MD [facility]       Additional Instructions for the Follow-ups that You Need to Schedule     Discharge Follow-up with PCP   As directed       Currently Documented PCP:    Samantha Zabala APRN    PCP Phone Number:    [phone]     Follow Up Details: If no PCP, call MD finder at [phone]               Time spent on Discharge including face to face service: 40 minutes    This patient has  been examined wearing appropriate Personal Protective Equipment and discussed with hospital infection control department. 08/25/21      Signature: Electronically signed by Du Zamorano MD, 08/25/21, 1:19 PM EDT.  [facility] Hospitalist Team

## 2021-08-25 NOTE — PLAN OF CARE
Goal Outcome Evaluation:  Plan of Care Reviewed With: patient   Progress: no change  Outcome Summary: Patient's BP continues to run on the low side. Went to dialysis this morning. Remains on 1L O2

## 2021-08-25 NOTE — PROGRESS NOTES
"RENAL/KCC:     LOS: 22 days    Patient Care Team:  Samantha Zabala APRN as PCP - General  Samantha Zabala APRN as PCP - Family Medicine  Jose G Rm MD as Consulting Physician (Cardiology)    Chief Complaint:  Palpitations    Subjective     Interval History:   Chart reviewed.  Seen on HD.    Objective     Vital Sign Min/Max for last 24 hours  Temp  Min: 97.6 °F (36.4 °C)  Max: 98.7 °F (37.1 °C)   BP  Min: 84/42  Max: 116/54   Pulse  Min: 62  Max: 104   Resp  Min: 10  Max: 18   SpO2  Min: 92 %  Max: 99 %   Flow (L/min)  Min: 1  Max: 2   Weight  Min: 103 kg (227 lb 1.2 oz)  Max: 103 kg (227 lb 1.2 oz)     Flowsheet Rows      First Filed Value   Admission Height  180.3 cm (71\") Documented at 08/03/2021 1733   Admission Weight  113 kg (250 lb) Documented at 08/03/2021 1733          No intake/output data recorded.  I/O last 3 completed shifts:  In: 300 [P.O.:300]  Out: 130 [Urine:130]    Physical Exam:  GEN: Awake, NAD  ENT: PERRL, EOMI, MMM  NECK: Supple, no JVD  CHEST: CTAB, no W/R/C  CV: RRR, no M/G/R  ABD: Soft, NT, +BS  SKIN: Warm and Dry  NEURO: CN's intact      WBC WBC   Date Value Ref Range Status   08/25/2021 7.60 3.40 - 10.80 10*3/mm3 Final   08/24/2021 9.20 3.40 - 10.80 10*3/mm3 Final   08/23/2021 9.00 3.40 - 10.80 10*3/mm3 Final      HGB Hemoglobin   Date Value Ref Range Status   08/25/2021 9.3 (L) 13.0 - 17.7 g/dL Final   08/24/2021 9.8 (L) 13.0 - 17.7 g/dL Final   08/23/2021 8.5 (L) 13.0 - 17.7 g/dL Final      HCT Hematocrit   Date Value Ref Range Status   08/25/2021 28.0 (L) 37.5 - 51.0 % Final   08/24/2021 29.6 (L) 37.5 - 51.0 % Final   08/23/2021 25.8 (L) 37.5 - 51.0 % Final      Platlets No results found for: LABPLAT   MCV MCV   Date Value Ref Range Status   08/25/2021 87.0 79.0 - 97.0 fL Final   08/24/2021 86.2 79.0 - 97.0 fL Final   08/23/2021 85.8 79.0 - 97.0 fL Final          Sodium Sodium   Date Value Ref Range Status   08/25/2021 131 (L) 136 - 145 mmol/L Final   08/24/2021 131 (L) 136 - 145 " mmol/L Final   08/23/2021 130 (L) 136 - 145 mmol/L Final      Potassium Potassium   Date Value Ref Range Status   08/25/2021 5.0 3.5 - 5.2 mmol/L Final   08/24/2021 4.8 3.5 - 5.2 mmol/L Final   08/23/2021 5.3 (H) 3.5 - 5.2 mmol/L Final      Chloride Chloride   Date Value Ref Range Status   08/25/2021 91 (L) 98 - 107 mmol/L Final   08/24/2021 92 (L) 98 - 107 mmol/L Final   08/23/2021 92 (L) 98 - 107 mmol/L Final      CO2 CO2   Date Value Ref Range Status   08/25/2021 26.0 22.0 - 29.0 mmol/L Final   08/24/2021 26.0 22.0 - 29.0 mmol/L Final   08/23/2021 26.0 22.0 - 29.0 mmol/L Final      BUN BUN   Date Value Ref Range Status   08/25/2021 55 (H) 8 - 23 mg/dL Final   08/24/2021 39 (H) 8 - 23 mg/dL Final   08/23/2021 59 (H) 8 - 23 mg/dL Final      Creatinine Creatinine   Date Value Ref Range Status   08/25/2021 3.97 (H) 0.76 - 1.27 mg/dL Final   08/24/2021 3.25 (H) 0.76 - 1.27 mg/dL Final   08/23/2021 4.15 (H) 0.76 - 1.27 mg/dL Final      Calcium Calcium   Date Value Ref Range Status   08/25/2021 9.2 8.6 - 10.5 mg/dL Final   08/24/2021 8.8 8.6 - 10.5 mg/dL Final   08/23/2021 8.7 8.6 - 10.5 mg/dL Final      PO4 No results found for: CAPO4   Albumin Albumin   Date Value Ref Range Status   08/25/2021 3.70 3.50 - 5.20 g/dL Final   08/24/2021 3.80 3.50 - 5.20 g/dL Final   08/23/2021 3.50 3.50 - 5.20 g/dL Final      Magnesium No results found for: MG   Uric Acid No results found for: URICACID        Results Review:     I reviewed the patient's new clinical results.    amiodarone, 200 mg, Oral, Q24H  apixaban, 5 mg, Oral, Q12H  aspirin, 81 mg, Oral, Daily  atorvastatin, 40 mg, Oral, Nightly  budesonide, 0.5 mg, Nebulization, BID - RT  calcium acetate, 1,334 mg, Oral, TID With Meals  carvedilol, 12.5 mg, Oral, Q12H  cholecalciferol, 1,000 Units, Oral, Daily  dilTIAZem CD, 240 mg, Oral, Q24H  donepezil, 10 mg, Oral, Nightly  DULoxetine, 60 mg, Oral, Daily  epoetin izabella-epbx, 20,000 Units, Subcutaneous, Weekly  insulin lispro, 0-24  Units, Subcutaneous, 4x Daily With Meals & Nightly  ipratropium-albuterol, 3 mL, Nebulization, Q4H - RT  isosorbide mononitrate, 60 mg, Oral, BID - Nitrates  polyethylene glycol, 17 g, Oral, Daily  senna-docusate sodium, 2 tablet, Oral, BID  sodium chloride, 10 mL, Intravenous, Q12H  vitamin B-12, 1,000 mcg, Oral, Daily           Medication Review: Reviewed    Assessment/Plan       Acute on chronic systolic heart failure (CMS/HCC)    S/P CABG (coronary artery bypass graft)    Essential hypertension    Elevated troponin    ANNETTE treated with BiPAP    Major depressive disorder, recurrent, mild (CMS/[facility])    Mixed hyperlipidemia    History of cerebrovascular accident    Flaccid hemiplegia of right dominant side as late effect of cerebral infarction (CMS/[facility])    Diabetic neuropathy (CMS/[facility])    Unspecified dementia with behavioral disturbance (CMS/[facility])    Coronary artery disease    Obesity    Vitamin D deficiency    Chronic venous hypertension (idiopathic) with ulcer and inflammation of bilateral lower extremity (CMS/[facility])    Chronic obstructive pulmonary disease, unspecified (CMS/[facility])    Chronic foot ulcer (CMS/[facility])    Paroxysmal atrial fibrillation (CMS/[facility])    Acute kidney injury (CMS/[facility])    Type 2 diabetes mellitus with hyperglycemia (CMS/[facility])    CKD (chronic kidney disease) stage 3, GFR 30-59 ml/min (CMS/[facility])    Anemia of renal disease    Acute respiratory failure with hypoxia and hypercapnia (CMS/[facility])      1. DILEEP  2. CKD III  3. Hyperkalemia  4. Pulmonary edema  5. Tachycardia  6. CHF  7. T2DM  8. Hypertension  9. Anemia     PLAN:  HD MWF.  BP running low so no UF today.  Patient appears to be at dry weight.  Remains oliguric.  May use Midodrine if BP doesn't improve after dialysis.  Has outpatient HD spot.  To rehab at D/C.       Yony Bhat M.D.  [facility]

## 2021-08-25 NOTE — PLAN OF CARE
Goal Outcome Evaluation:  Plan of Care Reviewed With: patient     Assessment: Benson Mclean presents with functional mobility impairments which indicate the need for skilled intervention. Tolerating session today without incident. Will continue to follow and progress as tolerated. Making good progress, however still needing several stops during gait with episodes of posterior loss of balance at times due to gen weakness. Tolerated this session 40 ft x 2 with min/CGA using rw. IP rehab remains reasonable and necessary to inc BLE mm strength, balance and promote return to PLOF.

## 2021-08-25 NOTE — PROGRESS NOTES
Daily Progress Note        Acute on chronic systolic heart failure (CMS/[facility])    S/P CABG (coronary artery bypass graft)    Essential hypertension    Elevated troponin    ANNETTE treated with BiPAP    Major depressive disorder, recurrent, mild (CMS/[facility])    Mixed hyperlipidemia    History of cerebrovascular accident    Flaccid hemiplegia of right dominant side as late effect of cerebral infarction (CMS/[facility])    Diabetic neuropathy (CMS/[facility])    Unspecified dementia with behavioral disturbance (CMS/[facility])    Coronary artery disease    Obesity    Vitamin D deficiency    Chronic venous hypertension (idiopathic) with ulcer and inflammation of bilateral lower extremity (CMS/[facility])    Chronic obstructive pulmonary disease, unspecified (CMS/[facility])    Chronic foot ulcer (CMS/[facility])    Paroxysmal atrial fibrillation (CMS/[facility])    Acute kidney injury (CMS/[facility])    Type 2 diabetes mellitus with hyperglycemia (CMS/[facility])    CKD (chronic kidney disease) stage 3, GFR 30-59 ml/min (CMS/[facility])    Anemia of renal disease    Acute respiratory failure with hypoxia and hypercapnia (CMS/[facility])      Assessment    Pneumonia  COPD  Bilateral pleural effusion  ANNETTE uses BiPAP    Atrial fibrillation with RVR   Acute on chronic systolic heart failure  CAD S/P CABG   HTN  Elevated troponin  Hyperlipidemia  Flaccid hemiplegia of right dominant side as late effect of cerebral infarction   DM II with neuropathy   Dementia with behavioral disturbance  Chronic venous hypertension (idiopathic) with ulcer/inflammation of BLE  Chronic foot ulcer   CKD   Tobacco use disorder   Anemia      ECHO 7/21/21  EF 30%  -Cardiology following    Plan    Encourage OOB daily     Titrate oxygen to keep sat > 88%  -May use home BiPAP    Diuresis per nephrology  Bronchodilator/Inhaled corticosteroids  Glycemic control  DVT prophylaxis         LOS: 22 days     Subjective         Objective     Vital signs for last 24 hours:  Vitals:    08/25/21 0224 08/25/21 0255 08/25/21 0532 08/25/21 0602    BP: (!) 84/42 91/51 94/53 105/43   BP Location: Left arm Left arm Left arm Left arm   Patient Position: Lying Lying Lying Lying   Pulse: 81 90 78 78   Resp: 16 12 10 16   Temp: 97.7 °F (36.5 °C)  98.4 °F (36.9 °C) 98.3 °F (36.8 °C)   TempSrc: Oral  Oral Oral   SpO2: 99% 92% 99%    Weight:   103 kg (227 lb 1.2 oz)    Height:           Intake/Output last 3 shifts:  I/O last 3 completed shifts:  In: -   Out: 3030 [Urine:30; Other:3000]  Intake/Output this shift:  I/O this shift:  In: 300 [P.O.:300]  Out: 100 [Urine:100]      Radiology  Imaging Results (Last 24 Hours)     ** No results found for the last 24 hours. **          Labs:  Results from last 7 days   Lab Units 08/25/21  0344   WBC 10*3/mm3 7.60   HEMOGLOBIN g/dL 9.3*   HEMATOCRIT % 28.0*   PLATELETS 10*3/mm3 377     Results from last 7 days   Lab Units 08/25/21  0344   SODIUM mmol/L 131*   POTASSIUM mmol/L 5.0   CHLORIDE mmol/L 91*   CO2 mmol/L 26.0   BUN mg/dL 55*   CREATININE mg/dL 3.97*   CALCIUM mg/dL 9.2   GLUCOSE mg/dL 182*         Results from last 7 days   Lab Units 08/25/21  0344 08/24/21  0435 08/23/21  0544   ALBUMIN g/dL 3.70 3.80 3.50                               Meds:   SCHEDULE  amiodarone, 200 mg, Oral, Q24H  apixaban, 5 mg, Oral, Q12H  aspirin, 81 mg, Oral, Daily  atorvastatin, 40 mg, Oral, Nightly  budesonide, 0.5 mg, Nebulization, BID - RT  calcium acetate, 667 mg, Oral, TID With Meals  carvedilol, 12.5 mg, Oral, Q12H  cholecalciferol, 1,000 Units, Oral, Daily  dilTIAZem CD, 240 mg, Oral, Q24H  donepezil, 10 mg, Oral, Nightly  DULoxetine, 60 mg, Oral, Daily  epoetin izabella-epbx, 20,000 Units, Subcutaneous, Weekly  insulin lispro, 0-24 Units, Subcutaneous, 4x Daily With Meals & Nightly  ipratropium-albuterol, 3 mL, Nebulization, Q4H - RT  isosorbide mononitrate, 60 mg, Oral, BID - Nitrates  polyethylene glycol, 17 g, Oral, Daily  senna-docusate sodium, 2 tablet, Oral, BID  sodium chloride, 10 mL, Intravenous, Q12H  vitamin B-12, 1,000 mcg,  Oral, Daily      Infusions     PRNs  •  acetaminophen **OR** acetaminophen **OR** acetaminophen  •  aluminum-magnesium hydroxide-simethicone  •  dextrose  •  dextrose  •  glucagon (human recombinant)  •  heparin (porcine)  •  insulin lispro **AND** insulin lispro  •  melatonin  •  nitroglycerin  •  ondansetron **OR** ondansetron  •  [COMPLETED] Insert peripheral IV **AND** sodium chloride  •  sodium chloride    Physical Exam:  Physical Exam  Cardiovascular:      Heart sounds: Murmur heard.     Abdominal:      Palpations: Abdomen is soft.   Skin:     General: Skin is warm and dry.   Neurological:      Mental Status: He is alert.         ROS  Review of Systems   Neurological: Positive for weakness.       I have reviewed the patient's new clinical results.    Electronically signed by FABIOLA Bui

## 2021-08-25 NOTE — PROGRESS NOTES
[facility] Medicine Services Daily Progress Note    Patient Name: Benson Mclean  : 1950  MRN: [id number]  Primary Care Physician:  Samantha Zabala APRN  Date of admission: 8/3/2021      Subjective      Chief Complaint: Shortness of breath.      Patient Reports     21: s/p HD yesterday.  Feels better.  On 65% FiO2.  Informed the patient that he is getting decreased insulin dose to minimize hypoglycemia.  21: Feels better.  Planned HD today.  On 50% FiO2.  21: Feels better.  21: OOB to chair.  No complaints.  Permacath to be placed today.  Case management working on arranging dialysis chair.  21: Constipation.  Ordered stool softeners.  Permacath scheduled for 21.  On 2L now.  At baseline uses wheelchair/walker.  Encouraged OOB to chair.  21:  + Bowel movement. On 2L.  Feels better.  Not on home oxygen.  Planned permacath tomorrow.       Patient had dialysis today  Had tunneled cath as well.  No new events or symptoms overnight        o2 much netter  No new sx        Hemodialysis today without ultrafiltration secondary to low blood pressure being at his dry weight    Review of Systems   All other systems reviewed and are negative.         Objective      Vitals:   Temp:  [97.6 °F (36.4 °C)-98.7 °F (37.1 °C)] 98.3 °F (36.8 °C)  Heart Rate:  [] 78  Resp:  [10-18] 16  BP: ()/(41-54) 105/43  Flow (L/min):  [1-2] 1    Physical Exam  Constitutional:       General: He is not in acute distress.  HENT:      Head: Normocephalic and atraumatic.      Nose: Nose normal.   Eyes:      General: No scleral icterus.     Extraocular Movements: Extraocular movements intact.      Pupils: Pupils are equal, round, and reactive to light.   Cardiovascular:      Rate and Rhythm: Normal rate and regular rhythm.   Pulmonary:      Effort: Pulmonary effort is normal.      Breath sounds: Examination of the right-lower field reveals decreased breath  sounds. Examination of the left-lower field reveals decreased breath sounds. Decreased breath sounds present.   Abdominal:      General: Bowel sounds are normal.      Comments: Obese abdomen.   Musculoskeletal:      Cervical back: Normal range of motion.      Comments: Decreased range of motion hips   Lymphadenopathy:      Cervical: No cervical adenopathy.   Skin:     General: Skin is warm.      Findings: No rash.   Neurological:      Mental Status: He is alert.      Cranial Nerves: Cranial nerves are intact.   Psychiatric:         Mood and Affect: Mood normal.           Result Review    Result Review:  I have personally reviewed the results from the time of this admission to 8/25/2021 09:04 EDT and agree with these findings:  [x]  Laboratory  [x]  Microbiology  [x]  Radiology  []  EKG/Telemetry   [x]  Cardiology/Vascular   []  Pathology  [x]  Old records  []  Other:            Assessment/Plan      Brief Patient Summary:    70 years old male initially admitted to [facility]  for A. fib RVR and fluid overload and eventually transfer to ICU for worsening hypoxemia and eventual initiation on hemodialysis for worsening DILEEP.       amiodarone, 200 mg, Oral, Q24H  apixaban, 5 mg, Oral, Q12H  aspirin, 81 mg, Oral, Daily  atorvastatin, 40 mg, Oral, Nightly  budesonide, 0.5 mg, Nebulization, BID - RT  calcium acetate, 1,334 mg, Oral, TID With Meals  carvedilol, 12.5 mg, Oral, Q12H  cholecalciferol, 1,000 Units, Oral, Daily  dilTIAZem CD, 240 mg, Oral, Q24H  donepezil, 10 mg, Oral, Nightly  DULoxetine, 60 mg, Oral, Daily  epoetin izabella-epbx, 20,000 Units, Subcutaneous, Weekly  insulin lispro, 0-24 Units, Subcutaneous, 4x Daily With Meals & Nightly  ipratropium-albuterol, 3 mL, Nebulization, Q4H - RT  isosorbide mononitrate, 60 mg, Oral, BID - Nitrates  polyethylene glycol, 17 g, Oral, Daily  senna-docusate sodium, 2 tablet, Oral, BID  sodium chloride, 10 mL, Intravenous, Q12H  vitamin B-12, 1,000 mcg, Oral, Daily             Active  Hospital Problems:  Active Hospital Problems    Diagnosis    • **Acute on chronic systolic heart failure (CMS/[facility])    • Acute respiratory failure with hypoxia and hypercapnia (CMS/[facility])    • Anemia of renal disease    • Acute kidney injury (CMS/[facility])    • CKD (chronic kidney disease) stage 3, GFR 30-59 ml/min (CMS/[facility])    • Type 2 diabetes mellitus with hyperglycemia (CMS/[facility])    • Mixed hyperlipidemia    • Obesity    • History of cerebrovascular accident    • Chronic obstructive pulmonary disease, unspecified (CMS/[facility])    • Flaccid hemiplegia of right dominant side as late effect of cerebral infarction (CMS/[facility])    • Unspecified dementia with behavioral disturbance (CMS/[facility])    • Major depressive disorder, recurrent, mild (CMS/[facility])    • Paroxysmal atrial fibrillation (CMS/[facility])    • Elevated troponin    • Essential hypertension    • S/P CABG (coronary artery bypass graft)    • Chronic venous hypertension (idiopathic) with ulcer and inflammation of bilateral lower extremity (CMS/[facility])    • Chronic foot ulcer (CMS/[facility])    • Coronary artery disease    • Vitamin D deficiency    • ANNETTE treated with BiPAP    • Diabetic neuropathy (CMS/[facility])           Acute on chronic systolic heart failure   -Improved with hemodialysis and diuretics  -on  Coreg, aspirin and Imdur   -ACE-I/ARB contraindicated d/t DILEEP     Acute respiratory failure with hypoxia and hypercapnia: Improving  -Not on home oxygen  -secondary to fluid overload and reason for transfer to ICU on 8/14/2020  -Initially on precision flow now on 2L nasal cannula  -titrate O2 to keep sat >90%  -AVAPS PRN  -pulmonary following     Acute kidney injury, CKD (chronic kidney disease) stage III complicated with fluid overload (POA)  -nephrology following  -Right IJ Shiley inserted and hemodialysis initiated on 08/14/21  -Permacath 8/23/2021 and needs outpatient dialysis arranged  -Social work working on dialysis chair        Elevated troponin:  -Denies chest pain  -likely demand  ischemia  -cardiology following     Paroxysmal atrial fibrillation with RVR (POA)  -continue amiodarone, diltiazem CD, Coreg  -Eliquis held due to plans for permacath placement 8/23/2021  -Cardiology following     Coronary artery disease, S/P CABG / Essential hypertension, chronic / Mixed hyperlipidemia  -continue aspirin, Eliquis, statin, Bumex, carvedilol, diltiazem CD, hydralazine, Imdur, and metolazone  -monitor BP     ANNETTE:  -Claims to be compliant with CPAP at home  -currently using hospital AVAP     Major depressive disorder:  -continue Cymbalta      History of cerebrovascular accident  -Flaccid hemiplegia of right dominant side as late effect of cerebral infarction   -At baseline walks with a walker wheelchair at home  -fall precautions  -continue aspirin, Elqius, and statin      Type 2 diabetes mellitus with hyperglycemia complicated with Diabetic neuropathy   -A1c 7.4% on 07/21/21  -Continue ISS     Mild dementia with behavioral disturbance  -continue donepezil      Obesity  -BMI 37.39   -encourage lifestyle modifications      Vitamin D deficiency  -continue cholecalciferol      Chronic obstructive pulmonary disease:  - former smoker  -stable without exacerbation  -continue bronchodilators      Anemia of chronic disease   -on weekly Retacrit   -Monitor H&H    Disposition  D/C Plan: Return to [place]. Will require precert. No PASRR required. [facility] OP HD referral started 8/18-pending chair time.      DVT prophylaxis: Resume Eliquis if okay with interventional radiologist.      CODE STATUS:    Code Status: CPR  Medical Interventions (Level of Support Prior to Arrest): Full      Disposition:  I expect patient to be discharged rehab.    This patient has been examined wearing appropriate Personal Protective Equipment and discussed with nursing. 08/25/21      Electronically signed by Du Zamorano MD, 08/25/21, 09:04 EDT.  [facility] Hospitalist Team

## 2021-08-25 NOTE — THERAPY TREATMENT NOTE
Subjective: Pt agreeable to therapeutic plan of care.    Objective:     Bed mobility - Min-A  Transfers - Min-A with rw  Ambulation - 40  Feet x 2 CGA, Min-A and with rolling walker    Pain: 0 VAS  Education: Provided education on importance of mobility and skilled verbal / tactile cueing throughout intervention.     Assessment: Benson Mclean presents with functional mobility impairments which indicate the need for skilled intervention. Tolerating session today without incident. Will continue to follow and progress as tolerated. Making good progress, however still needing several stops during gait with episodes of posterior loss of balance at times due to gen weakness. Tolerated this session 40 ft x 2 with min/CGA using rw. IP rehab remains reasonable and necessary to inc BLE mm strength, balance and promote return to PLOF.     Plan/Recommendations:   Pt would benefit from Inpatient Rehabilitation placement at discharge from facility and requires no DME at discharge.   Pt desires Inpatient Rehabilitation placement at discharge. Pt cooperative; agreeable to therapeutic recommendations and plan of care.     Basic Mobility 6-click:  Rollin = Total, A lot = 2, A little = 3; 4 = None  Supine>Sit:   1 = Total, A lot = 2, A little = 3; 4 = None   Sit>Stand with arms:  1 = Total, A lot = 2, A little = 3; 4 = None  Bed>Chair:   1 = Total, A lot = 2, A little = 3; 4 = None  Ambulate in room:  1 = Total, A lot = 2, A little = 3; 4 = None  3-5 Steps with railin = Total, A lot = 2, A little = 3; 4 = None  Score: 16    Post-Tx Position: Up in Chair and Call light and personal items within reach  PPE: gloves, surgical mask, eyewear protection

## 2021-08-25 NOTE — CASE MANAGEMENT/SOCIAL WORK
Continued Stay Note  [facility]     Patient Name: Benson Mclean  MRN: [id number]  Today's Date: 8/25/2021    Admit Date: 8/3/2021    Discharge Plan     Row Name 08/25/21 1259       Plan    Plan  DC Plan: Okay to return to [place], precert approved 8/25, no PASRR needed. New OP HD confirmed at [facility] MWF 6:15am, first treatment on 8/27.    Plan Comments  DC orders in at this time. YOHANNES contacted [place] liaison this morning to request status of precert. She requested to have COVID test. YOHANNES sent secure chat to MD and RN to request order for COVID test. YOHANNES spoke with Marlena from [facility] admissions that patient scheduled to dc today and first OP HD treatment scheduled for Friday, 8/27. Received notification from Azucena with [employer] and [place] liaison that precept has been approved. Updated MD and RN. Anticipate dc to [place] today after negative COVID test.        Expected Discharge Date and Time     Expected Discharge Date Expected Discharge Time    Aug 25, 2021         Phone communication or documentation only - no physical contact with patient or family.    Kristina Gastelum RN     Office Phone: [phone]  Office Cell: [phone]

## 2021-08-25 NOTE — PROGRESS NOTES
Referring Provider: Du Zamorano *    Reason for follow-up:  Atrial fibrillation  Cardiomyopathy  Shortness of breath  Bradycardia  Respiratory insufficiency  ESRD      Patient Care Team:  Samantha Zabala APRN as PCP - General  Samantha Zabala APRN as PCP - Family Medicine  Jose G Rm MD as Consulting Physician (Cardiology)    Subjective .  Feeling much better after dialysis was started.  Looking better.    ROS  Since I have last seen, the patient has been without any chest discomfort ,shortness of breath, palpitations, dizziness or syncope.  Denies having any headache ,abdominal pain ,nausea, vomiting , diarrhea constipation, loss of weight or loss of appetite.  Denies having any excessive bruising ,hematuria or blood in the stool.    Review of all systems negative except as indicated.    Reviewed ROS.  History  Past Medical History:   Diagnosis Date   • A-fib (CMS/[facility]) 8/3/2021   • Appetite loss    • Arthritis    • Brain bleed (CMS/[facility])    • Carpal tunnel syndrome on left    • CHF (congestive heart failure) (CMS/[facility])    • Chronic left-sided low back pain without sciatica 12/27/2017   • CKD (chronic kidney disease) stage 3, GFR 30-59 ml/min (CMS/[facility])    • Closed fracture of seventh thoracic vertebra (CMS/[facility]) 8/23/2020   • Cognitive communication deficit 12/1/2020   • COVID-19 2/19/2021   • Cytokine release syndrome, grade 1 5/24/2021   • Depression    • Diabetic neuropathy (CMS/[facility])    • DM2 (diabetes mellitus, type 2) (CMS/[facility])    • Hyperlipidemia    • Hypogonadism in male    • Nonsustained ventricular tachycardia (CMS/[facility]) 7/23/2021   • Obesity    • Paroxysmal atrial fibrillation (CMS/[facility]) 12/1/2020   • Sleep apnea    • Stroke (CMS/[facility])    • Unsteady gait        Past Surgical History:   Procedure Laterality Date   • ANGIOPLASTY      X2   • APPENDECTOMY     • CARDIAC CATHETERIZATION  11/13/2015   • CARDIAC CATHETERIZATION N/A 5/24/2021    Procedure: Left Heart Cath and coronary angiogram;   Surgeon: Jose G Rm MD;  Location:  [facility]VAS[facility];  Service: Cardiovascular;  Laterality: N/A;   • CARDIAC CATHETERIZATION  5/24/2021    Procedure: Saphenous Vein Graft;  Surgeon: Jose G Rm MD;  Location:  [facility];  Service: Cardiovascular;;   • CARDIAC CATHETERIZATION N/A 5/24/2021    Procedure: Percutaneous Coronary Intervention;  Surgeon: Bernabe Zambrano MD;  Location:  [facility];  Service: Cardiology;  Laterality: N/A;   • CARDIAC CATHETERIZATION N/A 5/24/2021    Procedure: Stent NIELS coronary;  Surgeon: Bernabe Zambrano MD;  Location:  [facility];  Service: Cardiology;  Laterality: N/A;   • CARDIAC CATHETERIZATION N/A 5/26/2021    Procedure: Percutaneous Coronary Intervention;  Surgeon: Bernabe Zambrano MD;  Location:  [facility];  Service: Cardiovascular;  Laterality: N/A;   • CARDIAC CATHETERIZATION N/A 5/26/2021    Procedure: Stent NIELS bypass graft;  Surgeon: Bernabe Zambrano MD;  Location: [facility] CATH INVASIVE LOCATION;  Service: Cardiovascular;  Laterality: N/A;   • CARDIAC ELECTROPHYSIOLOGY PROCEDURE N/A 5/24/2021    Procedure: Temporary Pacemaker;  Surgeon: Bernabe Zambrano MD;  Location:  [facility];  Service: Cardiology;  Laterality: N/A;   • CARDIAC ELECTROPHYSIOLOGY PROCEDURE N/A 5/26/2021    Procedure: Temporary Pacemaker;  Surgeon: Bernabe Zambrano MD;  Location: [facility] CATH INVASIVE LOCATION;  Service: Cardiovascular;  Laterality: N/A;   • CARPAL TUNNEL RELEASE Left 04/29/2018    carpal tunnel- lt hand// other hand surgeries    • CATARACT EXTRACTION, BILATERAL  2002    Dr. Lux Acosta   • CHOLECYSTECTOMY     • COLON RESECTION  2005   • CORONARY ANGIOPLASTY     • CORONARY ANGIOPLASTY WITH STENT PLACEMENT  11/13/2015    PTCA stent to proximal in stent and mid to distal lad   • CORONARY ANGIOPLASTY WITH STENT PLACEMENT  09/16/2016    PTCA stent to mid lad and stent to vein graft to marginal    • CORONARY ARTERY BYPASS GRAFT  2005    @ [facility]   • CYST REMOVAL      cyst removed from scrotum   • FOOT SURGERY Right 07/17/2018   • FOOT SURGERY Left 02/04/2019   • TOE AMPUTATION Right     right toe removed D/T infected cut that went to the bone       Family History   Problem Relation Age of Onset   • Heart disease Mother    • Heart disease Father    • Diabetes Sister    • Heart disease Sister    • Diabetes Brother    • Mental illness Brother        Social History     Tobacco Use   • Smoking status: Former Smoker     Packs/day: 1.00     Years: 60.00     Pack years: 60.00     Types: Cigarettes   • Smokeless tobacco: Never Used   • Tobacco comment: Patient quit smoking 11/2020   Vaping Use   • Vaping Use: Never used   Substance Use Topics   • Alcohol use: No   • Drug use: Yes     Frequency: 1.0 times per week     Types: Marijuana     Comment: socially        Medications Prior to Admission   Medication Sig Dispense Refill Last Dose   • albuterol sulfate  (90 Base) MCG/ACT inhaler Inhale 2 puffs Every 4 (Four) Hours As Needed.      • apixaban (ELIQUIS) 5 MG tablet tablet Take 5 mg by mouth Daily.      • aspirin 81 MG chewable tablet Chew 81 mg Daily.      • atorvastatin (LIPITOR) 40 MG tablet Take 40 mg by mouth Every Night.      • cholecalciferol (VITAMIN D3) 25 MCG (1000 UT) tablet Take 1,000 Units by mouth Daily.      • dextrose (GLUTOSE) 40 % gel Take  by mouth Every 1 (One) Hour As Needed for Low Blood Sugar.      • donepezil (ARICEPT) 10 MG tablet Take 10 mg by mouth Every Night.      • DULoxetine HCl (DRIZALMA) 60 MG capsule Take 60 mg by mouth Daily.      • gabapentin (NEURONTIN) 100 MG capsule Take 2 capsules by mouth 2 (two) times a day. 6 capsule 0    • hydrALAZINE (APRESOLINE) 50 MG tablet Take 50 mg by mouth 2 (two) times a day. Hold for SBP <110      • isosorbide mononitrate (IMDUR) 60 MG 24 hr tablet Take 60 mg by mouth 2 (Two) Times a Day.      • metoprolol succinate XL (TOPROL-XL) 100 MG 24 hr  tablet Take 1 tablet by mouth Daily for 30 days. 30 tablet 0    • nitroglycerin (NITROSTAT) 0.4 MG SL tablet Place 1 tablet under the tongue Every 5 (Five) Minutes As Needed for Chest Pain (Only if SBP Greater Than 100). Take no more than 3 doses in 15 minutes. 30 tablet 12    • spironolactone (ALDACTONE) 25 MG tablet Take 1 tablet by mouth Daily for 30 days. 30 tablet 0    • tiotropium (Spiriva HandiHaler) 18 MCG per inhalation capsule Place 1 capsule into inhaler and inhale Daily. 30 capsule 1    • vitamin B-12 (CYANOCOBALAMIN) 1000 MCG tablet Take 1,000 mcg by mouth Daily.      • [DISCONTINUED] furosemide (LASIX) 40 MG tablet Take 1 tablet by mouth 2 (Two) Times a Day for 30 days. 60 tablet 0        Allergies  Codeine    Scheduled Meds:amiodarone, 200 mg, Oral, Q24H  apixaban, 5 mg, Oral, Q12H  aspirin, 81 mg, Oral, Daily  atorvastatin, 40 mg, Oral, Nightly  budesonide, 0.5 mg, Nebulization, BID - RT  calcium acetate, 667 mg, Oral, TID With Meals  carvedilol, 12.5 mg, Oral, Q12H  cholecalciferol, 1,000 Units, Oral, Daily  dilTIAZem CD, 240 mg, Oral, Q24H  donepezil, 10 mg, Oral, Nightly  DULoxetine, 60 mg, Oral, Daily  epoetin izabella-epbx, 20,000 Units, Subcutaneous, Weekly  insulin lispro, 0-24 Units, Subcutaneous, 4x Daily With Meals & Nightly  ipratropium-albuterol, 3 mL, Nebulization, Q4H - RT  isosorbide mononitrate, 60 mg, Oral, BID - Nitrates  polyethylene glycol, 17 g, Oral, Daily  senna-docusate sodium, 2 tablet, Oral, BID  sodium chloride, 10 mL, Intravenous, Q12H  vitamin B-12, 1,000 mcg, Oral, Daily      Continuous Infusions:   PRN Meds:.•  acetaminophen **OR** acetaminophen **OR** acetaminophen  •  aluminum-magnesium hydroxide-simethicone  •  dextrose  •  dextrose  •  glucagon (human recombinant)  •  heparin (porcine)  •  insulin lispro **AND** insulin lispro  •  melatonin  •  nitroglycerin  •  ondansetron **OR** ondansetron  •  [COMPLETED] Insert peripheral IV **AND** sodium chloride  •  sodium  "chloride    Objective     VITAL SIGNS  Vitals:    08/25/21 0224 08/25/21 0255 08/25/21 0532 08/25/21 0602   BP: (!) 84/42 91/51 94/53 105/43   BP Location: Left arm Left arm Left arm Left arm   Patient Position: Lying Lying Lying Lying   Pulse: 81 90 78 78   Resp: 16 12 10 16   Temp: 97.7 °F (36.5 °C)  98.4 °F (36.9 °C) 98.3 °F (36.8 °C)   TempSrc: Oral  Oral Oral   SpO2: 99% 92% 99%    Weight:   103 kg (227 lb 1.2 oz)    Height:           Flowsheet Rows      First Filed Value   Admission Height  180.3 cm (71\") Documented at 08/03/2021 1733   Admission Weight  113 kg (250 lb) Documented at 08/03/2021 1733            Intake/Output Summary (Last 24 hours) at 8/25/2021 0632  Last data filed at 8/24/2021 2000  Gross per 24 hour   Intake 300 ml   Output 100 ml   Net 200 ml        TELEMETRY: Atrial fibrillation with controlled ventricular response.    Physical Exam:  The patient is awake and alert and not in distress.  Vital signs as noted above.  Exogenous obesity.  Head and neck revealed no carotid bruits or jugular venous distention.  No thyromegaly or lymphadenopathy is present  Lungs clear.  No wheezing.  Breath sounds are normal bilaterally.  Heart normal first and second heart sounds.  No murmur. No precordial rub is present.  No gallop is present.  Abdomen soft and nontender.  No organomegaly is present.  Extremities with good peripheral pulses without any pedal edema.  Skin warm and dry. Permacath in place.  Musculoskeletal system is grossly normal  CNS-grossly normal.  Results Review:   I reviewed the patient's new clinical results.  Lab Results (last 24 hours)     Procedure Component Value Units Date/Time    Manual Differential [[id number]]  (Normal) Collected: 08/25/21 0344    Specimen: Blood Updated: 08/25/21 0624     Neutrophil % 69.0 %      Lymphocyte % 21.0 %      Monocyte % 6.0 %      Eosinophil % 2.0 %      Basophil % 1.0 %      Bands %  1.0 %      Neutrophils Absolute 5.32 10*3/mm3      Lymphocytes " Absolute 1.60 10*3/mm3      Monocytes Absolute 0.46 10*3/mm3      Eosinophils Absolute 0.15 10*3/mm3      Basophils Absolute 0.08 10*3/mm3      RBC Morphology Normal     WBC Morphology Normal     Platelet Morphology Normal    CBC & Differential [[id number]]  (Abnormal) Collected: 08/25/21 0344    Specimen: Blood Updated: 08/25/21 0624    Narrative:      The following orders were created for panel order CBC & Differential.  Procedure                               Abnormality         Status                     ---------                               -----------         ------                     CBC Auto Differential[[id number]]        Abnormal            Final result               Scan Slide[[id number]]                                       Final result                 Please view results for these tests on the individual orders.    Scan Slide [[id number]] Collected: 08/25/21 0344    Specimen: Blood Updated: 08/25/21 0624     Scan Slide --     Comment: See Manual Differential Results       CBC Auto Differential [[id number]]  (Abnormal) Collected: 08/25/21 0344    Specimen: Blood Updated: 08/25/21 0624     WBC 7.60 10*3/mm3      RBC 3.22 10*6/mm3      Hemoglobin 9.3 g/dL      Hematocrit 28.0 %      MCV 87.0 fL      MCH 28.8 pg      MCHC 33.2 g/dL      RDW 16.8 %      RDW-SD 50.8 fl      MPV 7.7 fL      Platelets 377 10*3/mm3     Narrative:      The previously reported component NRBC is no longer being reported. Previous result was 0.0 /100 WBC (Reference Range: 0.0-0.2 /100 WBC) on 8/25/2021 at 0556 EDT.    Renal Function Panel [[id number]]  (Abnormal) Collected: 08/25/21 0344    Specimen: Blood Updated: 08/25/21 0528     Glucose 182 mg/dL      BUN 55 mg/dL      Creatinine 3.97 mg/dL      Sodium 131 mmol/L      Potassium 5.0 mmol/L      Chloride 91 mmol/L      CO2 26.0 mmol/L      Calcium 9.2 mg/dL      Albumin 3.70 g/dL      Phosphorus 8.2 mg/dL      Anion Gap 14.0 mmol/L      BUN/Creatinine Ratio 13.9     eGFR Non   Amer 15 mL/min/1.73     Narrative:      GFR Normal >60  Chronic Kidney Disease <60  Kidney Failure <15      POC Glucose Once [[id number]]  (Abnormal) Collected: 08/24/21 2014    Specimen: Blood Updated: 08/24/21 2015     Glucose 131 mg/dL      Comment: Serial Number: [id number]:  [id number]       POC Glucose Once [[id number]]  (Abnormal) Collected: 08/24/21 1630    Specimen: Blood Updated: 08/24/21 1631     Glucose 210 mg/dL      Comment: Serial Number: [id number]:  [id number]       POC Glucose Once [[id number]]  (Abnormal) Collected: 08/24/21 1157    Specimen: Blood Updated: 08/24/21 1159     Glucose 263 mg/dL      Comment: Serial Number: [id number]:  [id number]             Imaging Results (Last 24 Hours)     ** No results found for the last 24 hours. **      LAB RESULTS (LAST 7 DAYS)    CBC  Results from last 7 days   Lab Units 08/25/21  0344 08/24/21  0435 08/23/21  0544 08/22/21  0524 08/21/21  0613 08/20/21 0227 08/19/21  0613   WBC 10*3/mm3 7.60 9.20 9.00 8.40 9.00 10.70 10.70   RBC 10*6/mm3 3.22* 3.43* 3.01* 3.02* 3.16* 3.24* 3.16*   HEMOGLOBIN g/dL 9.3* 9.8* 8.5* 8.7* 9.0* 9.2* 9.4*   HEMATOCRIT % 28.0* 29.6* 25.8* 26.0* 27.3* 28.0* 27.7*   MCV fL 87.0 86.2 85.8 86.0 86.4 86.4 87.6   PLATELETS 10*3/mm3 377 403 446 425 426 455* 429       BMP  Results from last 7 days   Lab Units 08/25/21  0344 08/24/21  0435 08/23/21  0544 08/22/21  0524 08/21/21  0613 08/20/21  0227 08/19/21  0613   SODIUM mmol/L 131* 131* 130* 132* 133* 133* 134*   POTASSIUM mmol/L 5.0 4.8 5.3* 5.0 4.8 4.3 3.7   CHLORIDE mmol/L 91* 92* 92* 93* 94* 93* 94*   CO2 mmol/L 26.0 26.0 26.0 27.0 29.0 29.0 31.0*   BUN mg/dL 55* 39* 59* 46* 29* 37* 23   CREATININE mg/dL 3.97* 3.25* 4.15* 3.37* 2.57* 2.52* 1.50*   GLUCOSE mg/dL 182* 164* 166* 186* 185* 181* 198*   PHOSPHORUS mg/dL 8.2* 6.3* 6.3* 6.2* 4.1 4.0 2.4*       CMP   Results from last 7 days   Lab Units 08/25/21  0344 08/24/21  0435 08/23/21  0544 08/22/21  0524 08/21/21  0613  08/20/21  0227 08/19/21  0613   SODIUM mmol/L 131* 131* 130* 132* 133* 133* 134*   POTASSIUM mmol/L 5.0 4.8 5.3* 5.0 4.8 4.3 3.7   CHLORIDE mmol/L 91* 92* 92* 93* 94* 93* 94*   CO2 mmol/L 26.0 26.0 26.0 27.0 29.0 29.0 31.0*   BUN mg/dL 55* 39* 59* 46* 29* 37* 23   CREATININE mg/dL 3.97* 3.25* 4.15* 3.37* 2.57* 2.52* 1.50*   GLUCOSE mg/dL 182* 164* 166* 186* 185* 181* 198*   ALBUMIN g/dL 3.70 3.80 3.50 3.60 3.70 3.70 3.90         BNP        TROPONIN        CoAg        Creatinine Clearance  Estimated Creatinine Clearance: 19.8 mL/min (A) (by C-G formula based on SCr of 3.97 mg/dL (H)).    ABG        Radiology  IR Insert Tunneled CV Catheter Without Port 5 Plus    Result Date: 8/23/2021  Successful placement of right IJ PermCath.  Electronically Signed By-Niels Brooks MD On:8/23/2021 3:28 PM This report was finalized on [id number] by  Niels Brooks MD.              EKG              I personally viewed and interpreted the patient's EKG/Telemetry data: Atrial fibrillation  ECHOCARDIOGRAM:    Results for orders placed during the hospital encounter of 07/20/21    Adult Transthoracic Echo Complete With Contrast if Necessary Per Protocol    Interpretation Summary  · Estimated left ventricular EF = 30% Left ventricular systolic function is moderately decreased.    Occasions  Shortness of breath.    Technically satisfactory study.  Mitral valve is structurally normal.  Mild mitral regurgitation.  Tricuspid valve is structurally normal.  Aortic valve is structurally normal.  Pulmonic valve could not be well visualized.  No evidence for tricuspid or aortic regurgitation is seen by Doppler study.  Biatrial and biventricular enlargement is present.  Diffuse left ventricular hypocontractility with ejection fraction of 30%.  Atrial septum is intact.  Aorta is normal.  No pericardial effusion or intracardiac thrombus is seen.    Impression  Mild mitral regurgitation.  Significant biatrial and biventricular enlargement.  Diffuse  left ventricular hypocontractility with ejection fraction of 30%.  No pericardial effusion or intracardiac thrombus is seen.          STRESS MYOVIEW:    Cardiolite (Tc-99m Sestamibi) stress test    CARDIAC CATHETERIZATION:            OTHER:         Assessment/Plan     Principal Problem:    Acute on chronic systolic heart failure (CMS/HCC)  Active Problems:    S/P CABG (coronary artery bypass graft)    Essential hypertension    Elevated troponin    ANNETTE treated with BiPAP    Major depressive disorder, recurrent, mild (CMS/HCC)    Mixed hyperlipidemia    History of cerebrovascular accident    Flaccid hemiplegia of right dominant side as late effect of cerebral infarction (CMS/HCC)    Diabetic neuropathy (CMS/HCC)    Unspecified dementia with behavioral disturbance (CMS/HCC)    Coronary artery disease    Obesity    Vitamin D deficiency    Chronic venous hypertension (idiopathic) with ulcer and inflammation of bilateral lower extremity (CMS/HCC)    Chronic obstructive pulmonary disease, unspecified (CMS/HCC)    Chronic foot ulcer (CMS/HCC)    Paroxysmal atrial fibrillation (CMS/HCC)    Acute kidney injury (CMS/HCC)    Type 2 diabetes mellitus with hyperglycemia (CMS/HCC)    CKD (chronic kidney disease) stage 3, GFR 30-59 ml/min (CMS/HCC)    Anemia of renal disease    Acute respiratory failure with hypoxia and hypercapnia (CMS/HCC)      [[[[[[[[[[[[[[[[[[[[[[[[[    Impression  ==============  -Increased shortness of breath and palpitations     -Recent acute on chronic congestive heart failure.  Compensated at this time  Recent echocardiogram showed left ventricle dysfunction with ejection fraction of 35%.     -History of right-sided weakness with left MCA territory stroke.-Improved      -status post CABG 2005. status post stent to LAD 2009    Status post stent to LAD 11/13/2015  Status post stent to mid LAD and SVG to marginal branch 09/16/2016.  Status post stent to mid LAD 5/24/2021  Status post stent to SVG to RCA  5/26/2021     Cardiac catheterization 5/24/2021 revealed  Left ventricle angiogram was not performed due to pre-existing renal dysfunction.  Left main coronary artery normal.  Left anterior descending artery has mid segment lengthy 90% disease within the previously placed stent.  Distal left into descending artery has diffuse disease.  Circumflex coronary artery is totally occluded.  (Chronic)  Right coronary artery is chronically occluded.  SVG to marginal branch (jump graft to OM1 and OM 2) is totally occluded (new finding compared to 2015.  SVG to PDA has distal radiolucency.  However no definite obstructive disease is present.       -status post myocardial infarction 2000 and 2002 .      -history of intermittent atrial fibrillation-maintaining sinus rhythm     Transesophageal echocardiogram 11/30/2020.  Biatrial enlargement.  Smoking effect in the left atrium and left ventricle and left atrial appendage.  Mild mitral and aortic regurgitation.  Left ventricle enlargement with diffuse hypocontractility with ejection fraction of 35 to 40%.     Echocardiogram 11/27/2020 revealed left atrial enlargement left ventricle dysfunction with ejection fraction of 40%.  Negative bubble study.      -diabetes hypertension and sleep apnea in history of renal dysfunction .  Recent BUN/creatinine 38/1.36     -status post colon surgery (partial colectomy) appendectomy cholecystectomy right 4th toe removal and carpal tunnel surgery      -continued smoking 1 pack per day -abstinence from smoking      -allergy to codeine.  ============   Plan  ================  Atrial fibrillation-chronic  Rate is better controlled  Patient is on Coreg at 12.5 mg p.o. twice a day amiodarone and Cardizem.  Decrease amiodarone to 200 mg once a day since severity is better controlled.    Respiratory insufficiency.-Improved    ESRD  Patient is feeling much better after dialysis was started.  Status post permacath placement 8/23/2021    Anemia  Hemoglobin  6.9.  Hemoglobin 7.7-8/17/2021  8.6-8/18/2021  8.5-8/23/2021  9.8-8/24/2021  9.3-8/26/2021    Status post CABG  Patient is not having any angina pectoris    Status post stent to LAD 5/24/2021.  Status post stent to SVG to RCA 5/26/2021.       Anticoagulation  Eliquis was restarted.    Further plan will depend on patient's progress.   ]]]]]]]]]]]]]]]]]]]]]]           Jose G Rm MD  08/25/21  06:32 EDT

## 2021-08-26 NOTE — CASE MANAGEMENT/SOCIAL WORK
Case Management Discharge Note      Final Note: [place]    Provided Post Acute Provider List?: N/A  Provided Post Acute Provider Quality & Resource List?: N/A    Selected Continued Care - Discharged on 8/25/2021 Admission date: 8/3/2021 - Discharge disposition: Skilled Nursing Facility (DC - External)    Destination Coordination complete.    Service Provider Selected Services Address Phone Fax Patient Preferred    [facility] [address] [facility] [place] [address] [phone] --              Final Discharge Disposition Code: 03 - skilled nursing facility (SNF)

## 2021-08-27 NOTE — PLAN OF CARE
Continue to monitor and assess.    Problem: Fall Injury Risk  Goal: Absence of Fall and Fall-Related Injury  Outcome: Ongoing, Progressing     Problem: Adult Inpatient Plan of Care  Goal: Plan of Care Review  Outcome: Ongoing, Progressing  Flowsheets  Taken 6/30/2021 1618 by Isac Shi PT  Plan of Care Reviewed With: patient  Outcome Summary: Pt is a 71 YO M admitted from rehab with CHF exacerbation. Pt from home with spouse originally but has been at rehab for approx last month. Pt reports he has been able to ambulate with RWx recently at rehab. This date pt requires 2 person assit for mobility with RWx, with difficulty maintaining COG over COLEMAN. Pt with 3L O2 on during entire evaluation. Pt will require return to IP rehab following d/c.  Taken 6/30/2021 1558 by Yash Thapa OT  Progress: no change  Goal: Patient-Specific Goal (Individualized)  Outcome: Ongoing, Progressing  Goal: Absence of Hospital-Acquired Illness or Injury  Outcome: Ongoing, Progressing  Intervention: Identify and Manage Fall Risk  Flowsheets (Taken 7/1/2021 0554 by Mary Berger, PCT)  Safety Promotion/Fall Prevention:   safety round/check completed   room organization consistent   nonskid shoes/slippers when out of bed   lighting adjusted   fall prevention program maintained   clutter free environment maintained   assistive device/personal items within reach  Intervention: Prevent Skin Injury  Flowsheets  Taken 7/1/2021 0554 by Mary Berger, PCT  Body Position:   turned   side-lying, left  Taken 6/30/2021 2100 by Donte Chin LPN  Skin Protection:   incontinence pads utilized   adhesive use limited  Intervention: Prevent and Manage VTE (venous thromboembolism) Risk  Flowsheets (Taken 7/1/2021 0716)  VTE Prevention/Management: other (see comments)  Intervention: Prevent Infection  Flowsheets (Taken 7/1/2021 0554 by Mary Berger, PCT)  Infection Prevention:   visitors restricted/screened   single patient room provided   rest/sleep  promoted   personal protective equipment utilized   hand hygiene promoted  Goal: Optimal Comfort and Wellbeing  Outcome: Ongoing, Progressing  Intervention: Provide Person-Centered Care  Flowsheets (Taken 6/30/2021 2100 by Donte Chin LPN)  Trust Relationship/Rapport:   care explained   choices provided   emotional support provided   empathic listening provided   questions answered   questions encouraged   reassurance provided   thoughts/feelings acknowledged  Goal: Readiness for Transition of Care  Outcome: Ongoing, Progressing  Intervention: Mutually Develop Transition Plan  Flowsheets  Taken 6/28/2021 1608 by Naegele, Carol, RN  Equipment Currently Used at Home:   oxygen   hospital bed  Concerns to be Addressed: discharge planning  Patient/Family Anticipates Transition to: long-term care facility  Taken 6/26/2021 0057 by Ashkan De La Cruz RN  Transportation Anticipated: health plan transportation  Patient/Family Anticipated Services at Transition:      Problem: Skin Injury Risk Increased  Goal: Skin Health and Integrity  Outcome: Ongoing, Progressing  Intervention: Optimize Skin Protection  Flowsheets  Taken 7/1/2021 0554 by Mary Berger PCT  Head of Bed (HOB): HOB elevated  Taken 6/30/2021 2100 by Donte Chin LPN  Pressure Reduction Techniques:   frequent weight shift encouraged   weight shift assistance provided  Pressure Reduction Devices:   pressure-redistributing mattress utilized   positioning supports utilized  Skin Protection:   incontinence pads utilized   adhesive use limited  Intervention: Promote and Optimize Oral Intake  Flowsheets (Taken 7/1/2021 0716)  Oral Nutrition Promotion: calorie-dense foods provided     Problem: COPD Comorbidity  Goal: Maintenance of COPD Symptom Control  Outcome: Ongoing, Progressing  Intervention: Maintain COPD-Symptom Control  Flowsheets (Taken 7/1/2021 0500 by Huong Escalona, RN)  Medication Review/Management: medications reviewed      Problem: Diabetes Comorbidity  Goal: Blood Glucose Level Within Desired Range  Outcome: Ongoing, Progressing  Intervention: Maintain Glycemic Control  Flowsheets (Taken 6/30/2021 2100 by Donte Chin LPN)  Glycemic Management: blood glucose monitoring     Problem: Obstructive Sleep Apnea Risk or Actual (Comorbidity Management)  Goal: Unobstructed Breathing During Sleep  Outcome: Ongoing, Progressing     Problem: Pain Acute  Goal: Optimal Pain Control  Outcome: Ongoing, Progressing  Intervention: Develop Pain Management Plan  Flowsheets (Taken 6/29/2021 2030 by Noreen Boland LPN)  Pain Management Interventions: see MAR  Intervention: Prevent or Manage Pain  Flowsheets  Taken 6/30/2021 2100 by Donte Chin LPN  Sleep/Rest Enhancement: regular sleep/rest pattern promoted  Taken 6/30/2021 0715 by Zainab Schulz RN  Sensory Stimulation Regulation: television on  Intervention: Optimize Psychosocial Wellbeing  Flowsheets (Taken 6/30/2021 2100 by Donte Chin LPN)  Supportive Measures:   active listening utilized   self-care encouraged   verbalization of feelings encouraged  Diversional Activities: television     Problem: Breathing Pattern Ineffective  Goal: Effective Breathing Pattern  Outcome: Ongoing, Progressing  Intervention: Promote Improved Breathing Pattern  Flowsheets  Taken 7/1/2021 0554 by Mary Berger PCT  Head of Bed (HOB): HOB elevated  Taken 6/30/2021 2100 by Donte Chin LPN  Supportive Measures:   active listening utilized   self-care encouraged   verbalization of feelings encouraged  Taken 6/29/2021 1901 by Noreen Boland LPN  Airway/Ventilation Management:   airway patency maintained   calming measures promoted  Breathing Techniques/Airway Clearance: deep/controlled cough encouraged   Goal Outcome Evaluation:                  Other Records (please specify)

## 2021-08-30 ENCOUNTER — HOSPITAL ENCOUNTER (OUTPATIENT)
Facility: HOSPITAL | Age: 71
Setting detail: OBSERVATION
Discharge: NURSING, REHAB OR LONG-TERM CARE | End: 2021-08-31
Attending: EMERGENCY MEDICINE | Admitting: EMERGENCY MEDICINE

## 2021-08-30 DIAGNOSIS — E87.5 HYPERKALEMIA: ICD-10-CM

## 2021-08-30 DIAGNOSIS — N18.9 CHRONIC RENAL FAILURE, UNSPECIFIED CKD STAGE: Primary | ICD-10-CM

## 2021-08-30 LAB
ANION GAP SERPL CALCULATED.3IONS-SCNC: 13 MMOL/L (ref 5–15)
BUN SERPL-MCNC: 66 MG/DL (ref 8–23)
BUN/CREAT SERPL: 13.6 (ref 7–25)
CALCIUM SPEC-SCNC: 9 MG/DL (ref 8.6–10.5)
CHLORIDE SERPL-SCNC: 93 MMOL/L (ref 98–107)
CO2 SERPL-SCNC: 24 MMOL/L (ref 22–29)
CREAT SERPL-MCNC: 4.84 MG/DL (ref 0.76–1.27)
GFR SERPL CREATININE-BSD FRML MDRD: 12 ML/MIN/1.73
GFR SERPL CREATININE-BSD FRML MDRD: ABNORMAL ML/MIN/{1.73_M2}
GLUCOSE BLDC GLUCOMTR-MCNC: 161 MG/DL (ref 70–105)
GLUCOSE SERPL-MCNC: 175 MG/DL (ref 65–99)
POTASSIUM SERPL-SCNC: 6.5 MMOL/L (ref 3.5–5.2)
SODIUM SERPL-SCNC: 130 MMOL/L (ref 136–145)

## 2021-08-30 PROCEDURE — 25010000002 HEPARIN (PORCINE) PER 1000 UNITS: Performed by: INTERNAL MEDICINE

## 2021-08-30 PROCEDURE — 80048 BASIC METABOLIC PNL TOTAL CA: CPT | Performed by: EMERGENCY MEDICINE

## 2021-08-30 PROCEDURE — G0378 HOSPITAL OBSERVATION PER HR: HCPCS

## 2021-08-30 PROCEDURE — 94640 AIRWAY INHALATION TREATMENT: CPT

## 2021-08-30 PROCEDURE — 82962 GLUCOSE BLOOD TEST: CPT

## 2021-08-30 PROCEDURE — P9047 ALBUMIN (HUMAN), 25%, 50ML: HCPCS | Performed by: INTERNAL MEDICINE

## 2021-08-30 PROCEDURE — 94799 UNLISTED PULMONARY SVC/PX: CPT

## 2021-08-30 PROCEDURE — 99283 EMERGENCY DEPT VISIT LOW MDM: CPT

## 2021-08-30 PROCEDURE — G0257 UNSCHED DIALYSIS ESRD PT HOS: HCPCS

## 2021-08-30 PROCEDURE — 25010000002 ALBUMIN HUMAN 25% PER 50 ML: Performed by: INTERNAL MEDICINE

## 2021-08-30 RX ORDER — CARVEDILOL 6.25 MG/1
6.25 TABLET ORAL 2 TIMES DAILY WITH MEALS
Status: ON HOLD | COMMUNITY
End: 2022-01-01

## 2021-08-30 RX ORDER — MELATONIN
1000 DAILY
Status: DISCONTINUED | OUTPATIENT
Start: 2021-08-30 | End: 2021-08-31 | Stop reason: HOSPADM

## 2021-08-30 RX ORDER — ONDANSETRON 4 MG/1
4 TABLET, FILM COATED ORAL EVERY 6 HOURS PRN
Status: DISCONTINUED | OUTPATIENT
Start: 2021-08-30 | End: 2021-08-31 | Stop reason: HOSPADM

## 2021-08-30 RX ORDER — CARVEDILOL 6.25 MG/1
6.25 TABLET ORAL 2 TIMES DAILY WITH MEALS
Status: DISCONTINUED | OUTPATIENT
Start: 2021-08-30 | End: 2021-08-31 | Stop reason: HOSPADM

## 2021-08-30 RX ORDER — ACETAMINOPHEN 650 MG/1
650 SUPPOSITORY RECTAL EVERY 4 HOURS PRN
Status: DISCONTINUED | OUTPATIENT
Start: 2021-08-30 | End: 2021-08-31 | Stop reason: HOSPADM

## 2021-08-30 RX ORDER — GABAPENTIN 100 MG/1
200 CAPSULE ORAL EVERY 12 HOURS SCHEDULED
Status: DISCONTINUED | OUTPATIENT
Start: 2021-08-30 | End: 2021-08-31 | Stop reason: HOSPADM

## 2021-08-30 RX ORDER — SPIRONOLACTONE 25 MG/1
25 TABLET ORAL DAILY
Status: DISCONTINUED | OUTPATIENT
Start: 2021-08-30 | End: 2021-08-31 | Stop reason: HOSPADM

## 2021-08-30 RX ORDER — ALBUMIN (HUMAN) 12.5 G/50ML
25 SOLUTION INTRAVENOUS AS NEEDED
Status: ACTIVE | OUTPATIENT
Start: 2021-08-30 | End: 2021-08-30

## 2021-08-30 RX ORDER — HEPARIN SODIUM 1000 [USP'U]/ML
5000 INJECTION, SOLUTION INTRAVENOUS; SUBCUTANEOUS AS NEEDED
Status: DISCONTINUED | OUTPATIENT
Start: 2021-08-30 | End: 2021-08-31 | Stop reason: HOSPADM

## 2021-08-30 RX ORDER — ACETAMINOPHEN 325 MG/1
650 TABLET ORAL EVERY 4 HOURS PRN
Status: DISCONTINUED | OUTPATIENT
Start: 2021-08-30 | End: 2021-08-31 | Stop reason: HOSPADM

## 2021-08-30 RX ORDER — SODIUM CHLORIDE 0.9 % (FLUSH) 0.9 %
10 SYRINGE (ML) INJECTION EVERY 12 HOURS SCHEDULED
Status: DISCONTINUED | OUTPATIENT
Start: 2021-08-30 | End: 2021-08-31 | Stop reason: HOSPADM

## 2021-08-30 RX ORDER — ACETAMINOPHEN 160 MG/5ML
650 SOLUTION ORAL EVERY 4 HOURS PRN
Status: DISCONTINUED | OUTPATIENT
Start: 2021-08-30 | End: 2021-08-31 | Stop reason: HOSPADM

## 2021-08-30 RX ORDER — ONDANSETRON 2 MG/ML
4 INJECTION INTRAMUSCULAR; INTRAVENOUS EVERY 6 HOURS PRN
Status: DISCONTINUED | OUTPATIENT
Start: 2021-08-30 | End: 2021-08-31 | Stop reason: HOSPADM

## 2021-08-30 RX ORDER — HYDRALAZINE HYDROCHLORIDE 50 MG/1
50 TABLET, FILM COATED ORAL 2 TIMES DAILY
Status: ON HOLD | COMMUNITY
End: 2022-01-01

## 2021-08-30 RX ORDER — ALBUMIN (HUMAN) 12.5 G/50ML
25 SOLUTION INTRAVENOUS AS NEEDED
Status: DISCONTINUED | OUTPATIENT
Start: 2021-08-30 | End: 2021-08-31 | Stop reason: HOSPADM

## 2021-08-30 RX ORDER — GABAPENTIN 100 MG/1
200 CAPSULE ORAL 2 TIMES DAILY
COMMUNITY
End: 2021-01-01 | Stop reason: DRUGHIGH

## 2021-08-30 RX ORDER — FUROSEMIDE 40 MG/1
80 TABLET ORAL
Status: DISCONTINUED | OUTPATIENT
Start: 2021-08-30 | End: 2021-08-31 | Stop reason: HOSPADM

## 2021-08-30 RX ORDER — ALBUMIN (HUMAN) 12.5 G/50ML
12.5 SOLUTION INTRAVENOUS AS NEEDED
Status: DISCONTINUED | OUTPATIENT
Start: 2021-08-30 | End: 2021-08-31 | Stop reason: HOSPADM

## 2021-08-30 RX ORDER — DONEPEZIL HYDROCHLORIDE 5 MG/1
10 TABLET, FILM COATED ORAL NIGHTLY
Status: DISCONTINUED | OUTPATIENT
Start: 2021-08-30 | End: 2021-08-31 | Stop reason: HOSPADM

## 2021-08-30 RX ORDER — HYDRALAZINE HYDROCHLORIDE 25 MG/1
50 TABLET, FILM COATED ORAL EVERY 12 HOURS SCHEDULED
Status: DISCONTINUED | OUTPATIENT
Start: 2021-08-30 | End: 2021-08-31 | Stop reason: HOSPADM

## 2021-08-30 RX ORDER — AMIODARONE HYDROCHLORIDE 200 MG/1
200 TABLET ORAL DAILY
Status: ON HOLD | COMMUNITY
End: 2022-01-01

## 2021-08-30 RX ORDER — DILTIAZEM HYDROCHLORIDE 240 MG/1
240 CAPSULE, COATED, EXTENDED RELEASE ORAL
Status: DISCONTINUED | OUTPATIENT
Start: 2021-08-30 | End: 2021-08-31 | Stop reason: HOSPADM

## 2021-08-30 RX ORDER — NITROGLYCERIN 0.4 MG/1
0.4 TABLET SUBLINGUAL
Status: ON HOLD | COMMUNITY
End: 2022-01-01

## 2021-08-30 RX ORDER — ISOSORBIDE MONONITRATE 60 MG/1
60 TABLET, EXTENDED RELEASE ORAL 2 TIMES DAILY
Status: DISCONTINUED | OUTPATIENT
Start: 2021-08-30 | End: 2021-08-31 | Stop reason: HOSPADM

## 2021-08-30 RX ORDER — ALBUTEROL SULFATE 2.5 MG/3ML
2.5 SOLUTION RESPIRATORY (INHALATION) EVERY 6 HOURS PRN
Status: DISCONTINUED | OUTPATIENT
Start: 2021-08-30 | End: 2021-08-31 | Stop reason: HOSPADM

## 2021-08-30 RX ORDER — ASPIRIN 81 MG/1
81 TABLET, CHEWABLE ORAL DAILY
Status: DISCONTINUED | OUTPATIENT
Start: 2021-08-30 | End: 2021-08-31 | Stop reason: HOSPADM

## 2021-08-30 RX ORDER — ATORVASTATIN CALCIUM 40 MG/1
40 TABLET, FILM COATED ORAL NIGHTLY
Status: DISCONTINUED | OUTPATIENT
Start: 2021-08-30 | End: 2021-08-31 | Stop reason: HOSPADM

## 2021-08-30 RX ORDER — SODIUM CHLORIDE 0.9 % (FLUSH) 0.9 %
10 SYRINGE (ML) INJECTION AS NEEDED
Status: DISCONTINUED | OUTPATIENT
Start: 2021-08-30 | End: 2021-08-31 | Stop reason: HOSPADM

## 2021-08-30 RX ORDER — FUROSEMIDE 80 MG
80 TABLET ORAL 2 TIMES DAILY
Status: ON HOLD | COMMUNITY
End: 2022-01-01

## 2021-08-30 RX ORDER — SPIRONOLACTONE 25 MG/1
25 TABLET ORAL DAILY
Status: ON HOLD | COMMUNITY
End: 2022-01-01

## 2021-08-30 RX ORDER — AMIODARONE HYDROCHLORIDE 200 MG/1
200 TABLET ORAL 2 TIMES DAILY
Status: DISCONTINUED | OUTPATIENT
Start: 2021-08-30 | End: 2021-08-31 | Stop reason: HOSPADM

## 2021-08-30 RX ADMIN — IPRATROPIUM BROMIDE 0.5 MG: 0.5 SOLUTION RESPIRATORY (INHALATION) at 18:19

## 2021-08-30 RX ADMIN — ALBUMIN HUMAN 12.5 G: 0.25 SOLUTION INTRAVENOUS at 20:48

## 2021-08-30 RX ADMIN — ISOSORBIDE MONONITRATE 60 MG: 60 TABLET, EXTENDED RELEASE ORAL at 18:00

## 2021-08-30 RX ADMIN — ASPIRIN 81 MG: 81 TABLET, CHEWABLE ORAL at 18:00

## 2021-08-30 RX ADMIN — SPIRONOLACTONE 25 MG: 25 TABLET ORAL at 18:00

## 2021-08-30 RX ADMIN — Medication 1000 UNITS: at 18:00

## 2021-08-30 RX ADMIN — FUROSEMIDE 80 MG: 40 TABLET ORAL at 18:00

## 2021-08-30 RX ADMIN — HEPARIN SODIUM 4100 UNITS: 1000 INJECTION INTRAVENOUS; SUBCUTANEOUS at 23:39

## 2021-08-30 RX ADMIN — DILTIAZEM HYDROCHLORIDE 240 MG: 240 CAPSULE, COATED, EXTENDED RELEASE ORAL at 18:00

## 2021-08-30 RX ADMIN — CARVEDILOL 6.25 MG: 6.25 TABLET, FILM COATED ORAL at 18:00

## 2021-08-31 VITALS
SYSTOLIC BLOOD PRESSURE: 104 MMHG | BODY MASS INDEX: 33.58 KG/M2 | WEIGHT: 234.57 LBS | TEMPERATURE: 98 F | HEART RATE: 56 BPM | HEIGHT: 70 IN | RESPIRATION RATE: 18 BRPM | OXYGEN SATURATION: 98 % | DIASTOLIC BLOOD PRESSURE: 56 MMHG

## 2021-08-31 PROBLEM — E87.5 HYPERKALEMIA: Status: RESOLVED | Noted: 2021-08-30 | Resolved: 2021-08-31

## 2021-08-31 LAB — GLUCOSE BLDC GLUCOMTR-MCNC: 161 MG/DL (ref 70–105)

## 2021-08-31 PROCEDURE — 94799 UNLISTED PULMONARY SVC/PX: CPT

## 2021-08-31 PROCEDURE — G0378 HOSPITAL OBSERVATION PER HR: HCPCS

## 2021-08-31 PROCEDURE — 82962 GLUCOSE BLOOD TEST: CPT

## 2021-08-31 RX ADMIN — IPRATROPIUM BROMIDE 0.5 MG: 0.5 SOLUTION RESPIRATORY (INHALATION) at 07:18

## 2021-08-31 RX ADMIN — HYDRALAZINE HYDROCHLORIDE 50 MG: 25 TABLET, FILM COATED ORAL at 08:21

## 2021-08-31 RX ADMIN — GABAPENTIN 200 MG: 100 CAPSULE ORAL at 08:22

## 2021-08-31 RX ADMIN — ASPIRIN 81 MG: 81 TABLET, CHEWABLE ORAL at 08:21

## 2021-08-31 RX ADMIN — Medication 10 ML: at 00:11

## 2021-08-31 RX ADMIN — Medication 1000 UNITS: at 08:20

## 2021-08-31 RX ADMIN — AMIODARONE HYDROCHLORIDE 200 MG: 200 TABLET ORAL at 00:10

## 2021-08-31 RX ADMIN — APIXABAN 5 MG: 5 TABLET, FILM COATED ORAL at 00:10

## 2021-08-31 RX ADMIN — GABAPENTIN 200 MG: 100 CAPSULE ORAL at 00:10

## 2021-08-31 RX ADMIN — ATORVASTATIN CALCIUM 40 MG: 40 TABLET, FILM COATED ORAL at 00:10

## 2021-08-31 RX ADMIN — FUROSEMIDE 80 MG: 40 TABLET ORAL at 08:22

## 2021-08-31 RX ADMIN — ISOSORBIDE MONONITRATE 60 MG: 60 TABLET, EXTENDED RELEASE ORAL at 08:20

## 2021-08-31 RX ADMIN — AMIODARONE HYDROCHLORIDE 200 MG: 200 TABLET ORAL at 08:21

## 2021-08-31 RX ADMIN — CARVEDILOL 6.25 MG: 6.25 TABLET, FILM COATED ORAL at 08:22

## 2021-08-31 RX ADMIN — SPIRONOLACTONE 25 MG: 25 TABLET ORAL at 08:22

## 2021-08-31 RX ADMIN — APIXABAN 5 MG: 5 TABLET, FILM COATED ORAL at 08:22

## 2021-08-31 RX ADMIN — DONEPEZIL HYDROCHLORIDE 10 MG: 5 TABLET, FILM COATED ORAL at 00:09

## 2021-09-23 NOTE — PROGRESS NOTES
Encounter Date:09/23/2021  Last seen 8/25/2021      Patient ID: Benson Mclean is a 70 y.o. male.    Chief Complaint:  Status post CABG  ESRD  Anticoagulation management  Persistent atrial fibrillation  Hypertension    History of Present Illness    Patient recently was admitted to [facility] and was found to have acute on chronic congestive heart failure and started on dialysis.  Since I have last seen, the patient has been without any chest discomfort ,shortness of breath, palpitations, dizziness or syncope.  Denies having any headache ,abdominal pain ,nausea, vomiting , diarrhea constipation, loss of weight or loss of appetite.  Denies having any excessive bruising ,hematuria or blood in the stool.    Review of all systems negative except as indicated.    Reviewed ROS.    Assessment and Plan       [[[[[[[[[[[[[[[[[[[[[[[[[    Impression  ==============   -status post CABG 2005. status post stent to LAD 2009    Status post stent to LAD 11/13/2015  Status post stent to mid LAD and SVG to marginal branch 09/16/2016.  Status post stent to mid LAD 5/24/2021  Status post stent to SVG to RCA 5/26/2021     Cardiac catheterization 5/24/2021 revealed  Left ventricle angiogram was not performed due to pre-existing renal dysfunction.  Left main coronary artery normal.  Left anterior descending artery has mid segment lengthy 90% disease within the previously placed stent.  Distal left into descending artery has diffuse disease.  Circumflex coronary artery is totally occluded.  (Chronic)  Right coronary artery is chronically occluded.  SVG to marginal branch (jump graft to OM1 and OM 2) is totally occluded (new finding compared to 2015.  SVG to PDA has distal radiolucency.  However no definite obstructive disease is present.       -status post myocardial infarction 2000 and 2002 .      -history of intermittent but mostly persistent atrial fibrillation     -Acute on chronic congestive heart failure.  Compensated  at this time.    Recent echocardiogram showed left ventricle dysfunction with ejection fraction of 35%.     -History of right-sided weakness with left MCA territory stroke.-Improved    Transesophageal echocardiogram 11/30/2020.  Biatrial enlargement.  Smoking effect in the left atrium and left ventricle and left atrial appendage.  Mild mitral and aortic regurgitation.  Left ventricle enlargement with diffuse hypocontractility with ejection fraction of 35 to 40%.     Echocardiogram 11/27/2020 revealed left atrial enlargement left ventricle dysfunction with ejection fraction of 40%.  Negative bubble study.      -diabetes hypertension and sleep apnea.  ESRD.     -status post colon surgery (partial colectomy) appendectomy cholecystectomy right 4th toe removal and carpal tunnel surgery      -continued smoking 1 pack per day -abstinence from smoking      -allergy to codeine.  ============   Plan  ================  Status post CABG  Patient is not having any angina pectoris or congestive heart failure    Atrial fibrillation-chronic  Rate is better controlled  Patient is on Coreg at 12.5 mg p.o. twice a day amiodarone and Cardizem.  Continue amiodarone to 200 mg once a day.    Respiratory insufficiency.-Improved     ESRD  Patient is feeling much better after dialysis was started.  Status post permacath placement 8/23/2021      Status post stent to LAD 5/24/2021.  Status post stent to SVG to RCA 5/26/2021.       Anticoagulation  Patient is on Eliquis 5 mg twice a day.  Observe for toxic effects.    Further plan will depend on patient's progress.   ]]]]]]]]]]]]]]]]]]]]]]               Diagnosis Plan   1. Hx of CABG  ECG 12 Lead   2. Dyslipidemia  ECG 12 Lead   3. Diabetes mellitus due to underlying condition without complication, without long-term current use of insulin (HCC)  ECG 12 Lead   4. Tachycardia  ECG 12 Lead   5. Essential hypertension  ECG 12 Lead   LAB RESULTS (LAST 7 DAYS)    CBC        BMP        CMP          BNP        TROPONIN        CoAg        Creatinine Clearance  Estimated Creatinine Clearance: 17.3 mL/min (A) (by C-G formula based on SCr of 4.84 mg/dL (H)).    ABG        Radiology  No radiology results for the last day                The following portions of the patient's history were reviewed and updated as appropriate: allergies, current medications, past family history, past medical history, past social history, past surgical history and problem list.    Review of Systems   Constitutional: Negative for fever and malaise/fatigue.   Cardiovascular: Negative for chest pain, dyspnea on exertion and palpitations.   Respiratory: Negative for cough and shortness of breath.    Skin: Negative for rash.   Gastrointestinal: Negative for abdominal pain, nausea and vomiting.   Neurological: Negative for focal weakness and headaches.   All other systems reviewed and are negative.        Current Outpatient Medications:   •  albuterol sulfate  (90 Base) MCG/ACT inhaler, Inhale 2 puffs Every 4 (Four) Hours As Needed., Disp: , Rfl:   •  amiodarone (PACERONE) 200 MG tablet, Take 200 mg by mouth 2 (Two) Times a Day., Disp: , Rfl:   •  apixaban (ELIQUIS) 5 MG tablet tablet, Take 1 tablet by mouth Every 12 (Twelve) Hours., Disp: 60 tablet, Rfl:   •  aspirin 81 MG chewable tablet, Chew 81 mg Daily., Disp: , Rfl:   •  atorvastatin (LIPITOR) 40 MG tablet, Take 40 mg by mouth Every Night., Disp: , Rfl:   •  carvedilol (COREG) 6.25 MG tablet, Take 6.25 mg by mouth 2 (Two) Times a Day With Meals., Disp: , Rfl:   •  cholecalciferol (VITAMIN D3) 25 MCG (1000 UT) tablet, Take 1,000 Units by mouth Daily., Disp: , Rfl:   •  dilTIAZem CD (CARDIZEM CD) 240 MG 24 hr capsule, Take 240 mg by mouth Daily., Disp: , Rfl:   •  donepezil (ARICEPT) 10 MG tablet, Take 10 mg by mouth Every Night., Disp: , Rfl:   •  furosemide (LASIX) 80 MG tablet, Take 80 mg by mouth 2 (Two) Times a Day., Disp: , Rfl:   •  gabapentin (NEURONTIN) 300 MG  capsule, Take 300 mg by mouth 2 (Two) Times a Day., Disp: , Rfl:   •  hydrALAZINE (APRESOLINE) 50 MG tablet, Take 50 mg by mouth 2 (two) times a day., Disp: , Rfl:   •  HYDROcodone-acetaminophen (NORCO) 5-325 MG per tablet, Take 1 tablet by mouth Every 6 (Six) Hours As Needed., Disp: , Rfl:   •  insulin NPH-insulin regular (humuLIN 70/30,novoLIN 70/30) (70-30) 100 UNIT/ML injection, Inject  under the skin into the appropriate area as directed 2 (Two) Times a Day With Meals., Disp: , Rfl:   •  isosorbide mononitrate (IMDUR) 60 MG 24 hr tablet, Take 60 mg by mouth 2 (Two) Times a Day., Disp: , Rfl:   •  nitroglycerin (NITROSTAT) 0.4 MG SL tablet, Place 0.4 mg under the tongue Every 5 (Five) Minutes As Needed for Chest Pain. Take no more than 3 doses in 15 minutes., Disp: , Rfl:   •  sertraline (ZOLOFT) 50 MG tablet, Take 50 mg by mouth Daily., Disp: , Rfl:   •  spironolactone (ALDACTONE) 25 MG tablet, Take 25 mg by mouth Daily., Disp: , Rfl:   •  tiotropium (Spiriva HandiHaler) 18 MCG per inhalation capsule, Place 1 capsule into inhaler and inhale Daily., Disp: 30 capsule, Rfl: 1  •  vitamin B-12 (CYANOCOBALAMIN) 1000 MCG tablet, Take 1,000 mcg by mouth Daily., Disp: , Rfl:   •  dilTIAZem CD (CARDIZEM CD) 240 MG 24 hr capsule, Take 1 capsule by mouth Daily for 30 days., Disp: 30 capsule, Rfl: 0    Allergies   Allergen Reactions   • Codeine Itching       Family History   Problem Relation Age of Onset   • Heart disease Mother    • Heart disease Father    • Diabetes Sister    • Heart disease Sister    • Diabetes Brother    • Mental illness Brother        Past Surgical History:   Procedure Laterality Date   • ANGIOPLASTY      X2   • APPENDECTOMY     • CARDIAC CATHETERIZATION  11/13/2015   • CARDIAC CATHETERIZATION N/A 5/24/2021    Procedure: Left Heart Cath and coronary angiogram;  Surgeon: Jose G Rm MD;  Location: [facility] CATH INVASIVE LOCATION;  Service: Cardiovascular;  Laterality: N/A;   • CARDIAC  CATHETERIZATION  5/24/2021    Procedure: Saphenous Vein Graft;  Surgeon: Jose G Rm MD;  Location:  [facility] CATH INVASIVE LOCATION;  Service: Cardiovascular;;   • CARDIAC CATHETERIZATION N/A 5/24/2021    Procedure: Percutaneous Coronary Intervention;  Surgeon: Bernabe Zambrano MD;  Location:  [facility] CATH INVASIVE LOCATION;  Service: Cardiology;  Laterality: N/A;   • CARDIAC CATHETERIZATION N/A 5/24/2021    Procedure: Stent NIELS coronary;  Surgeon: Bernabe Zambrano MD;  Location:  [facility] CATH INVASIVE LOCATION;  Service: Cardiology;  Laterality: N/A;   • CARDIAC CATHETERIZATION N/A 5/26/2021    Procedure: Percutaneous Coronary Intervention;  Surgeon: Bernabe Zambrano MD;  Location:  [facility] CATH INVASIVE LOCATION;  Service: Cardiovascular;  Laterality: N/A;   • CARDIAC CATHETERIZATION N/A 5/26/2021    Procedure: Stent NIELS bypass graft;  Surgeon: Bernabe Zambrano MD;  Location:  [facility] CATH INVASIVE LOCATION;  Service: Cardiovascular;  Laterality: N/A;   • CARDIAC ELECTROPHYSIOLOGY PROCEDURE N/A 5/24/2021    Procedure: Temporary Pacemaker;  Surgeon: Bernabe Zambrano MD;  Location:  [facility] CATH INVASIVE LOCATION;  Service: Cardiology;  Laterality: N/A;   • CARDIAC ELECTROPHYSIOLOGY PROCEDURE N/A 5/26/2021    Procedure: Temporary Pacemaker;  Surgeon: Bernabe Zambrano MD;  Location: [facility] CATH INVASIVE LOC[facility];  Service: Cardiovascular;  Laterality: N/A;   • CARPAL TUNNEL RELEASE Left 04/29/2018    carpal tunnel- lt hand// other hand surgeries    • CATARACT EXTRACTION, BILATERAL  2002    Dr. Lux Acosta   • CHOLECYSTECTOMY     • COLON RESECTION  2005   • CORONARY ANGIOPLASTY     • CORONARY ANGIOPLASTY WITH STENT PLACEMENT  11/13/2015    PTCA stent to proximal in stent and mid to distal lad   • CORONARY ANGIOPLASTY WITH STENT PLACEMENT  09/16/2016    PTCA stent to mid lad and stent to vein graft to marginal   • CORONARY ARTERY BYPASS GRAFT  2005    @ Health system   • CYST REMOVAL      cyst removed from scrotum   • FOOT SURGERY Right 07/17/2018    • FOOT SURGERY Left 02/04/2019   • PORTACATH PLACEMENT     • TOE AMPUTATION Right     right toe removed D/T infected cut that went to the bone       Past Medical History:   Diagnosis Date   • A-fib (CMS/HCC) 8/3/2021   • Appetite loss    • Arthritis    • Brain bleed (CMS/HCC)    • Carpal tunnel syndrome on left    • CHF (congestive heart failure) (CMS/HCC)    • Chronic left-sided low back pain without sciatica 12/27/2017   • CKD (chronic kidney disease) stage 3, GFR 30-59 ml/min (CMS/HCC)    • Closed fracture of seventh thoracic vertebra (CMS/HCC) 8/23/2020   • Cognitive communication deficit 12/1/2020   • COVID-19 2/19/2021   • Cytokine release syndrome, grade 1 5/24/2021   • Depression    • Diabetic neuropathy (CMS/HCC)    • DM2 (diabetes mellitus, type 2) (CMS/HCC)    • Hyperlipidemia    • Hypogonadism in male    • Nonsustained ventricular tachycardia (CMS/HCC) 7/23/2021   • Obesity    • Paroxysmal atrial fibrillation (CMS/HCC) 12/1/2020   • Sleep apnea    • Stroke (CMS/HCC)    • Unsteady gait        Family History   Problem Relation Age of Onset   • Heart disease Mother    • Heart disease Father    • Diabetes Sister    • Heart disease Sister    • Diabetes Brother    • Mental illness Brother        Social History     Socioeconomic History   • Marital status:      Spouse name: Not on file   • Number of children: Not on file   • Years of education: Not on file   • Highest education level: Not on file   Tobacco Use   • Smoking status: Former Smoker     Packs/day: 1.00     Years: 60.00     Pack years: 60.00     Types: Cigarettes   • Smokeless tobacco: Never Used   • Tobacco comment: Patient quit smoking 11/2020   Vaping Use   • Vaping Use: Never used   Substance and Sexual Activity   • Alcohol use: No   • Drug use: Yes     Frequency: 1.0 times per week     Types: Marijuana     Comment: socially   • Sexual activity: Defer           ECG 12 Lead    Date/Time: 9/23/2021 10:30 AM  Performed by: Jose G Rm  "MD  Authorized by: Jose G Rm MD   Comparison: compared with previous ECG   Similar to previous ECG  Comparison to previous ECG: Atrial fibrillation left bundle branch block 104/min nonspecific ST-T wave changes normal axis no ectopy no significant change from 8/9/2021                Objective:       Physical Exam    /78 (BP Location: Left arm, Patient Position: Sitting, Cuff Size: Adult)   Pulse 99   Ht 177.8 cm (70\")   Wt 106 kg (234 lb)   SpO2 (!) 89%   BMI 33.58 kg/m²   The patient is alert, oriented and in no distress.    Vital signs as noted above.    Head and neck revealed no carotid bruits or jugular venous distension.  No thyromegaly or lymphadenopathy is present.    Lungs clear.  No wheezing.  Breath sounds are normal bilaterally.    Heart normal first and second heart sounds.  No murmur..  No pericardial rub is present.  No gallop is present.    Abdomen soft and nontender.  No organomegaly is present.    Extremities revealed good peripheral pulses without any pedal edema.    Skin warm and dry.    Musculoskeletal system is grossly normal.    CNS grossly normal.    Reviewed and unchanged from last visit.        "

## 2022-01-01 ENCOUNTER — ANESTHESIA (OUTPATIENT)
Dept: CARDIOLOGY | Facility: HOSPITAL | Age: 72
End: 2022-01-01

## 2022-01-01 ENCOUNTER — APPOINTMENT (OUTPATIENT)
Dept: VASCULAR SURGERY | Facility: HOSPITAL | Age: 72
End: 2022-01-01

## 2022-01-01 ENCOUNTER — APPOINTMENT (OUTPATIENT)
Dept: CT IMAGING | Facility: HOSPITAL | Age: 72
End: 2022-01-01

## 2022-01-01 ENCOUNTER — APPOINTMENT (OUTPATIENT)
Dept: CARDIOLOGY | Facility: HOSPITAL | Age: 72
End: 2022-01-01

## 2022-01-01 ENCOUNTER — APPOINTMENT (OUTPATIENT)
Dept: GENERAL RADIOLOGY | Facility: HOSPITAL | Age: 72
End: 2022-01-01

## 2022-01-01 ENCOUNTER — LAB REQUISITION (OUTPATIENT)
Dept: LAB | Facility: HOSPITAL | Age: 72
End: 2022-01-01

## 2022-01-01 ENCOUNTER — HOSPITAL ENCOUNTER (OUTPATIENT)
Facility: HOSPITAL | Age: 72
Setting detail: OBSERVATION
Discharge: NURSING, REHAB OR LONG-TERM CARE | End: 2022-07-04
Attending: EMERGENCY MEDICINE | Admitting: INTERNAL MEDICINE

## 2022-01-01 ENCOUNTER — HOSPITAL ENCOUNTER (OUTPATIENT)
Dept: CARDIOLOGY | Facility: HOSPITAL | Age: 72
Discharge: HOME | End: 2022-07-12
Admitting: INTERNAL MEDICINE

## 2022-01-01 ENCOUNTER — TRANSCRIBE ORDERS (OUTPATIENT)
Dept: ADMINISTRATIVE | Facility: HOSPITAL | Age: 72
End: 2022-01-01

## 2022-01-01 ENCOUNTER — HOSPITAL ENCOUNTER (OUTPATIENT)
Facility: HOSPITAL | Age: 72
Setting detail: HOSPITAL OUTPATIENT SURGERY
Discharge: NURSING, REHAB OR LONG-TERM CARE | End: 2022-01-20
Attending: SURGERY | Admitting: SURGERY

## 2022-01-01 ENCOUNTER — TELEPHONE (OUTPATIENT)
Dept: CARDIOLOGY | Facility: CLINIC | Age: 72
End: 2022-01-01

## 2022-01-01 ENCOUNTER — HOSPITAL ENCOUNTER (EMERGENCY)
Facility: HOSPITAL | Age: 72
Discharge: NURSING, REHAB OR LONG-TERM CARE | End: 2022-04-12
Attending: EMERGENCY MEDICINE | Admitting: EMERGENCY MEDICINE

## 2022-01-01 ENCOUNTER — HOSPITAL ENCOUNTER (EMERGENCY)
Facility: HOSPITAL | Age: 72
Discharge: NURSING, REHAB OR LONG-TERM CARE | End: 2022-02-24
Attending: EMERGENCY MEDICINE | Admitting: EMERGENCY MEDICINE

## 2022-01-01 ENCOUNTER — HOSPITAL ENCOUNTER (OUTPATIENT)
Dept: CARDIOLOGY | Facility: HOSPITAL | Age: 72
Discharge: HOME | End: 2022-07-19

## 2022-01-01 ENCOUNTER — OFFICE VISIT (OUTPATIENT)
Dept: CARDIOLOGY | Facility: CLINIC | Age: 72
End: 2022-01-01

## 2022-01-01 ENCOUNTER — LAB (OUTPATIENT)
Dept: LAB | Facility: HOSPITAL | Age: 72
End: 2022-01-01

## 2022-01-01 ENCOUNTER — HOSPITAL ENCOUNTER (INPATIENT)
Facility: HOSPITAL | Age: 72
LOS: 6 days | Discharge: NURSING, REHAB OR LONG-TERM CARE | End: 2022-03-18
Attending: EMERGENCY MEDICINE | Admitting: HOSPITALIST

## 2022-01-01 ENCOUNTER — HOSPITAL ENCOUNTER (INPATIENT)
Facility: HOSPITAL | Age: 72
LOS: 10 days | Discharge: NURSING, REHAB OR LONG-TERM CARE | End: 2022-05-19
Attending: EMERGENCY MEDICINE | Admitting: INTERNAL MEDICINE

## 2022-01-01 ENCOUNTER — ANESTHESIA EVENT (OUTPATIENT)
Dept: PERIOP | Facility: HOSPITAL | Age: 72
End: 2022-01-01

## 2022-01-01 ENCOUNTER — HOSPITAL ENCOUNTER (OUTPATIENT)
Dept: CARDIOLOGY | Facility: HOSPITAL | Age: 72
Discharge: HOME | End: 2022-09-06

## 2022-01-01 ENCOUNTER — HOSPITAL ENCOUNTER (OUTPATIENT)
Facility: HOSPITAL | Age: 72
Discharge: HOME | End: 2022-06-28
Attending: SURGERY | Admitting: SURGERY

## 2022-01-01 ENCOUNTER — HOSPITAL ENCOUNTER (INPATIENT)
Facility: HOSPITAL | Age: 72
LOS: 13 days | Discharge: NURSING, REHAB OR LONG-TERM CARE | End: 2022-04-11
Attending: EMERGENCY MEDICINE | Admitting: HOSPITALIST

## 2022-01-01 ENCOUNTER — APPOINTMENT (OUTPATIENT)
Dept: MRI IMAGING | Facility: HOSPITAL | Age: 72
End: 2022-01-01

## 2022-01-01 ENCOUNTER — ANESTHESIA EVENT (OUTPATIENT)
Dept: CARDIOLOGY | Facility: HOSPITAL | Age: 72
End: 2022-01-01

## 2022-01-01 ENCOUNTER — ANESTHESIA EVENT (OUTPATIENT)
Dept: GASTROENTEROLOGY | Facility: HOSPITAL | Age: 72
End: 2022-01-01

## 2022-01-01 ENCOUNTER — HOSPITAL ENCOUNTER (OUTPATIENT)
Dept: MRI IMAGING | Facility: HOSPITAL | Age: 72
Discharge: HOME | End: 2022-02-24
Admitting: FAMILY MEDICINE

## 2022-01-01 ENCOUNTER — HOSPITAL ENCOUNTER (OUTPATIENT)
Dept: MRI IMAGING | Facility: HOSPITAL | Age: 72
Discharge: HOME | End: 2022-01-13

## 2022-01-01 ENCOUNTER — HOSPITAL ENCOUNTER (OUTPATIENT)
Dept: CARDIOLOGY | Facility: HOSPITAL | Age: 72
Discharge: HOME | End: 2022-05-03

## 2022-01-01 ENCOUNTER — HOSPITAL ENCOUNTER (OUTPATIENT)
Facility: HOSPITAL | Age: 72
Setting detail: OBSERVATION
Discharge: NURSING, REHAB OR LONG-TERM CARE | End: 2022-03-10
Attending: HOSPITALIST | Admitting: HOSPITALIST

## 2022-01-01 ENCOUNTER — ANESTHESIA (OUTPATIENT)
Dept: PERIOP | Facility: HOSPITAL | Age: 72
End: 2022-01-01

## 2022-01-01 ENCOUNTER — ANESTHESIA (OUTPATIENT)
Dept: GASTROENTEROLOGY | Facility: HOSPITAL | Age: 72
End: 2022-01-01

## 2022-01-01 ENCOUNTER — HOSPITAL ENCOUNTER (OUTPATIENT)
Facility: HOSPITAL | Age: 72
Setting detail: HOSPITAL OUTPATIENT SURGERY
End: 2022-01-01
Attending: SURGERY | Admitting: SURGERY

## 2022-01-01 VITALS
DIASTOLIC BLOOD PRESSURE: 68 MMHG | HEIGHT: 69 IN | BODY MASS INDEX: 36.9 KG/M2 | WEIGHT: 249.12 LBS | OXYGEN SATURATION: 98 % | HEART RATE: 94 BPM | SYSTOLIC BLOOD PRESSURE: 109 MMHG | RESPIRATION RATE: 18 BRPM | TEMPERATURE: 97.7 F

## 2022-01-01 VITALS
RESPIRATION RATE: 15 BRPM | DIASTOLIC BLOOD PRESSURE: 61 MMHG | HEART RATE: 93 BPM | SYSTOLIC BLOOD PRESSURE: 95 MMHG | HEIGHT: 70 IN | TEMPERATURE: 97.7 F | WEIGHT: 243.61 LBS | BODY MASS INDEX: 34.88 KG/M2 | OXYGEN SATURATION: 100 %

## 2022-01-01 VITALS
SYSTOLIC BLOOD PRESSURE: 100 MMHG | HEART RATE: 113 BPM | BODY MASS INDEX: 34.72 KG/M2 | TEMPERATURE: 98.2 F | WEIGHT: 242.51 LBS | OXYGEN SATURATION: 100 % | HEIGHT: 70 IN | DIASTOLIC BLOOD PRESSURE: 68 MMHG | RESPIRATION RATE: 15 BRPM

## 2022-01-01 VITALS
BODY MASS INDEX: 31.92 KG/M2 | DIASTOLIC BLOOD PRESSURE: 77 MMHG | OXYGEN SATURATION: 98 % | HEIGHT: 71 IN | WEIGHT: 228 LBS | HEART RATE: 60 BPM | SYSTOLIC BLOOD PRESSURE: 141 MMHG

## 2022-01-01 VITALS — WEIGHT: 228 LBS | BODY MASS INDEX: 31.92 KG/M2 | HEIGHT: 71 IN

## 2022-01-01 VITALS
BODY MASS INDEX: 29.67 KG/M2 | OXYGEN SATURATION: 97 % | HEIGHT: 71 IN | TEMPERATURE: 97.6 F | RESPIRATION RATE: 14 BRPM | WEIGHT: 211.93 LBS | DIASTOLIC BLOOD PRESSURE: 59 MMHG | HEART RATE: 98 BPM | SYSTOLIC BLOOD PRESSURE: 115 MMHG

## 2022-01-01 VITALS
BODY MASS INDEX: 32.07 KG/M2 | RESPIRATION RATE: 18 BRPM | TEMPERATURE: 97.4 F | OXYGEN SATURATION: 97 % | HEIGHT: 70 IN | WEIGHT: 223.99 LBS | SYSTOLIC BLOOD PRESSURE: 124 MMHG | DIASTOLIC BLOOD PRESSURE: 52 MMHG | HEART RATE: 73 BPM

## 2022-01-01 VITALS — DIASTOLIC BLOOD PRESSURE: 46 MMHG | OXYGEN SATURATION: 100 % | SYSTOLIC BLOOD PRESSURE: 90 MMHG

## 2022-01-01 VITALS
RESPIRATION RATE: 15 BRPM | SYSTOLIC BLOOD PRESSURE: 120 MMHG | HEART RATE: 100 BPM | DIASTOLIC BLOOD PRESSURE: 76 MMHG | WEIGHT: 211 LBS | HEIGHT: 72 IN | OXYGEN SATURATION: 99 % | BODY MASS INDEX: 28.58 KG/M2 | TEMPERATURE: 98.2 F

## 2022-01-01 VITALS
OXYGEN SATURATION: 99 % | SYSTOLIC BLOOD PRESSURE: 103 MMHG | HEIGHT: 71 IN | WEIGHT: 228.13 LBS | TEMPERATURE: 97.7 F | DIASTOLIC BLOOD PRESSURE: 55 MMHG | RESPIRATION RATE: 18 BRPM | BODY MASS INDEX: 31.94 KG/M2 | HEART RATE: 62 BPM

## 2022-01-01 VITALS
OXYGEN SATURATION: 100 % | WEIGHT: 257.2 LBS | HEART RATE: 71 BPM | DIASTOLIC BLOOD PRESSURE: 59 MMHG | SYSTOLIC BLOOD PRESSURE: 146 MMHG | TEMPERATURE: 97.8 F | BODY MASS INDEX: 36.82 KG/M2 | RESPIRATION RATE: 17 BRPM | HEIGHT: 70 IN

## 2022-01-01 VITALS
HEIGHT: 70 IN | OXYGEN SATURATION: 99 % | SYSTOLIC BLOOD PRESSURE: 150 MMHG | BODY MASS INDEX: 32.14 KG/M2 | DIASTOLIC BLOOD PRESSURE: 76 MMHG | HEART RATE: 65 BPM

## 2022-01-01 VITALS
SYSTOLIC BLOOD PRESSURE: 148 MMHG | TEMPERATURE: 97.6 F | DIASTOLIC BLOOD PRESSURE: 81 MMHG | OXYGEN SATURATION: 100 % | HEART RATE: 72 BPM | RESPIRATION RATE: 16 BRPM

## 2022-01-01 DIAGNOSIS — N18.9 ACUTE ON CHRONIC RENAL INSUFFICIENCY: ICD-10-CM

## 2022-01-01 DIAGNOSIS — Z99.2 ENCOUNTER FOR EXTRACORPOREAL DIALYSIS: ICD-10-CM

## 2022-01-01 DIAGNOSIS — J90 PLEURAL EFFUSION, BILATERAL: ICD-10-CM

## 2022-01-01 DIAGNOSIS — I49.01 VENTRICULAR FIBRILLATION: Primary | ICD-10-CM

## 2022-01-01 DIAGNOSIS — I50.9 CONGESTIVE HEART FAILURE, UNSPECIFIED HF CHRONICITY, UNSPECIFIED HEART FAILURE TYPE: Primary | ICD-10-CM

## 2022-01-01 DIAGNOSIS — N18.6 END STAGE RENAL DISEASE: ICD-10-CM

## 2022-01-01 DIAGNOSIS — R31.9 URINARY TRACT INFECTION WITH HEMATURIA, SITE UNSPECIFIED: ICD-10-CM

## 2022-01-01 DIAGNOSIS — N18.6 STAGE 5 CHRONIC KIDNEY DISEASE ON CHRONIC DIALYSIS: ICD-10-CM

## 2022-01-01 DIAGNOSIS — M54.9 OTHER CHRONIC BACK PAIN: ICD-10-CM

## 2022-01-01 DIAGNOSIS — G89.29 OTHER CHRONIC BACK PAIN: ICD-10-CM

## 2022-01-01 DIAGNOSIS — T14.8XXA SKIN AVULSION: ICD-10-CM

## 2022-01-01 DIAGNOSIS — J96.02 ACUTE RESPIRATORY FAILURE WITH HYPOXIA AND HYPERCAPNIA: ICD-10-CM

## 2022-01-01 DIAGNOSIS — I49.01 CARDIAC ARREST WITH VENTRICULAR FIBRILLATION: ICD-10-CM

## 2022-01-01 DIAGNOSIS — E08.9 DIABETES MELLITUS DUE TO UNDERLYING CONDITION WITHOUT COMPLICATION, WITHOUT LONG-TERM CURRENT USE OF INSULIN: ICD-10-CM

## 2022-01-01 DIAGNOSIS — E78.5 DYSLIPIDEMIA: ICD-10-CM

## 2022-01-01 DIAGNOSIS — N28.9 ACUTE ON CHRONIC RENAL INSUFFICIENCY: ICD-10-CM

## 2022-01-01 DIAGNOSIS — I42.0 CARDIOMYOPATHY, DILATED: ICD-10-CM

## 2022-01-01 DIAGNOSIS — J96.01 ACUTE RESPIRATORY FAILURE WITH HYPOXIA AND HYPERCAPNIA: ICD-10-CM

## 2022-01-01 DIAGNOSIS — I46.9 CARDIOPULMONARY ARREST WITH SUCCESSFUL RESUSCITATION: ICD-10-CM

## 2022-01-01 DIAGNOSIS — Z95.1 HX OF CABG: ICD-10-CM

## 2022-01-01 DIAGNOSIS — I46.9 CARDIAC ARREST WITH VENTRICULAR FIBRILLATION: ICD-10-CM

## 2022-01-01 DIAGNOSIS — I95.9 HYPOTENSION, UNSPECIFIED HYPOTENSION TYPE: ICD-10-CM

## 2022-01-01 DIAGNOSIS — R05.9 COUGH: ICD-10-CM

## 2022-01-01 DIAGNOSIS — R05.9 COUGH: Primary | ICD-10-CM

## 2022-01-01 DIAGNOSIS — I10 ESSENTIAL HYPERTENSION: ICD-10-CM

## 2022-01-01 DIAGNOSIS — I50.9 ACUTE ON CHRONIC CONGESTIVE HEART FAILURE, UNSPECIFIED HEART FAILURE TYPE: ICD-10-CM

## 2022-01-01 DIAGNOSIS — R06.00 DYSPNEA, UNSPECIFIED TYPE: ICD-10-CM

## 2022-01-01 DIAGNOSIS — R78.81 BACTEREMIA: ICD-10-CM

## 2022-01-01 DIAGNOSIS — Z99.2 STAGE 5 CHRONIC KIDNEY DISEASE ON CHRONIC DIALYSIS: ICD-10-CM

## 2022-01-01 DIAGNOSIS — I10 ESSENTIAL (PRIMARY) HYPERTENSION: ICD-10-CM

## 2022-01-01 DIAGNOSIS — Z78.9 DIFFICULT INTRAVENOUS ACCESS: ICD-10-CM

## 2022-01-01 DIAGNOSIS — R00.0 TACHYCARDIA: ICD-10-CM

## 2022-01-01 DIAGNOSIS — Z91.15 NONCOMPLIANCE WITH RENAL DIALYSIS: ICD-10-CM

## 2022-01-01 DIAGNOSIS — Z99.2 ENCOUNTER FOR EXTRACORPOREAL DIALYSIS: Primary | ICD-10-CM

## 2022-01-01 DIAGNOSIS — N39.0 URINARY TRACT INFECTION WITH HEMATURIA, SITE UNSPECIFIED: ICD-10-CM

## 2022-01-01 DIAGNOSIS — I95.89 OTHER SPECIFIED HYPOTENSION: ICD-10-CM

## 2022-01-01 DIAGNOSIS — K92.2 GASTROINTESTINAL HEMORRHAGE, UNSPECIFIED GASTROINTESTINAL HEMORRHAGE TYPE: Primary | ICD-10-CM

## 2022-01-01 DIAGNOSIS — I50.9 CONGESTIVE HEART FAILURE, UNSPECIFIED HF CHRONICITY, UNSPECIFIED HEART FAILURE TYPE: ICD-10-CM

## 2022-01-01 DIAGNOSIS — R06.00 DYSPNEA, UNSPECIFIED TYPE: Primary | ICD-10-CM

## 2022-01-01 DIAGNOSIS — M54.50 ACUTE LEFT-SIDED LOW BACK PAIN WITHOUT SCIATICA: Primary | ICD-10-CM

## 2022-01-01 DIAGNOSIS — J90 BILATERAL PLEURAL EFFUSION: ICD-10-CM

## 2022-01-01 DIAGNOSIS — Z95.1 HX OF CABG: Primary | ICD-10-CM

## 2022-01-01 DIAGNOSIS — N18.6 END STAGE RENAL DISEASE: Primary | ICD-10-CM

## 2022-01-01 DIAGNOSIS — Z01.818 PREOP EXAMINATION: Primary | ICD-10-CM

## 2022-01-01 DIAGNOSIS — D50.0 NORMOCYTIC ANEMIA DUE TO BLOOD LOSS: ICD-10-CM

## 2022-01-01 DIAGNOSIS — M79.604 RIGHT LEG PAIN: ICD-10-CM

## 2022-01-01 DIAGNOSIS — K59.00 CONSTIPATION, UNSPECIFIED CONSTIPATION TYPE: Primary | ICD-10-CM

## 2022-01-01 DIAGNOSIS — R10.30 LOWER ABDOMINAL PAIN: ICD-10-CM

## 2022-01-01 LAB
ABO GROUP BLD: NORMAL
ACT BLD: 161 SECONDS (ref 89–137)
ACT BLD: 167 SECONDS (ref 89–137)
ACT BLD: 208 SECONDS (ref 89–137)
ALBUMIN SERPL-MCNC: 2.4 G/DL (ref 3.5–5.2)
ALBUMIN SERPL-MCNC: 2.6 G/DL (ref 3.5–5.2)
ALBUMIN SERPL-MCNC: 2.7 G/DL (ref 3.5–5.2)
ALBUMIN SERPL-MCNC: 2.8 G/DL (ref 3.5–5.2)
ALBUMIN SERPL-MCNC: 2.9 G/DL (ref 3.5–5.2)
ALBUMIN SERPL-MCNC: 2.9 G/DL (ref 3.5–5.2)
ALBUMIN SERPL-MCNC: 3 G/DL (ref 3.5–5.2)
ALBUMIN SERPL-MCNC: 3 G/DL (ref 3.5–5.2)
ALBUMIN SERPL-MCNC: 3.1 G/DL (ref 3.5–5.2)
ALBUMIN SERPL-MCNC: 3.4 G/DL (ref 3.5–5.2)
ALBUMIN SERPL-MCNC: 3.4 G/DL (ref 3.5–5.2)
ALBUMIN/GLOB SERPL: 0.8 G/DL
ALBUMIN/GLOB SERPL: 0.9 G/DL
ALBUMIN/GLOB SERPL: 0.9 G/DL
ALBUMIN/GLOB SERPL: 1 G/DL
ALBUMIN/GLOB SERPL: 1 G/DL
ALBUMIN/GLOB SERPL: 1.1 G/DL
ALBUMIN/GLOB SERPL: 1.3 G/DL
ALBUMIN/GLOB SERPL: 1.3 G/DL
ALP SERPL-CCNC: 45 U/L (ref 39–117)
ALP SERPL-CCNC: 46 U/L (ref 39–117)
ALP SERPL-CCNC: 49 U/L (ref 39–117)
ALP SERPL-CCNC: 52 U/L (ref 39–117)
ALP SERPL-CCNC: 55 U/L (ref 39–117)
ALP SERPL-CCNC: 55 U/L (ref 39–117)
ALP SERPL-CCNC: 56 U/L (ref 39–117)
ALP SERPL-CCNC: 57 U/L (ref 39–117)
ALP SERPL-CCNC: 58 U/L (ref 39–117)
ALP SERPL-CCNC: 59 U/L (ref 39–117)
ALP SERPL-CCNC: 64 U/L (ref 39–117)
ALP SERPL-CCNC: 71 U/L (ref 39–117)
ALT SERPL W P-5'-P-CCNC: 10 U/L (ref 1–41)
ALT SERPL W P-5'-P-CCNC: 5 U/L (ref 1–41)
ALT SERPL W P-5'-P-CCNC: 6 U/L (ref 1–41)
ALT SERPL W P-5'-P-CCNC: 6 U/L (ref 1–41)
ALT SERPL W P-5'-P-CCNC: 7 U/L (ref 1–41)
ALT SERPL W P-5'-P-CCNC: 8 U/L (ref 1–41)
ALT SERPL W P-5'-P-CCNC: 8 U/L (ref 1–41)
ANION GAP SERPL CALCULATED.3IONS-SCNC: 10 MMOL/L (ref 5–15)
ANION GAP SERPL CALCULATED.3IONS-SCNC: 11 MMOL/L (ref 5–15)
ANION GAP SERPL CALCULATED.3IONS-SCNC: 12 MMOL/L (ref 5–15)
ANION GAP SERPL CALCULATED.3IONS-SCNC: 13 MMOL/L (ref 5–15)
ANION GAP SERPL CALCULATED.3IONS-SCNC: 14 MMOL/L (ref 5–15)
ANION GAP SERPL CALCULATED.3IONS-SCNC: 15 MMOL/L (ref 5–15)
ANION GAP SERPL CALCULATED.3IONS-SCNC: 15 MMOL/L (ref 5–15)
ANION GAP SERPL CALCULATED.3IONS-SCNC: 16 MMOL/L (ref 5–15)
ANION GAP SERPL CALCULATED.3IONS-SCNC: 17 MMOL/L (ref 5–15)
ANION GAP SERPL CALCULATED.3IONS-SCNC: 8 MMOL/L (ref 5–15)
ANION GAP SERPL CALCULATED.3IONS-SCNC: 9 MMOL/L (ref 5–15)
ANISOCYTOSIS BLD QL: ABNORMAL
ANISOCYTOSIS BLD QL: NORMAL
APPEARANCE FLD: CLEAR
APTT PPP: 22.2 SECONDS (ref 24–31)
APTT PPP: 30.9 SECONDS (ref 61–76.5)
ARTERIAL PATENCY WRIST A: POSITIVE
AST SERPL-CCNC: 10 U/L (ref 1–40)
AST SERPL-CCNC: 10 U/L (ref 1–40)
AST SERPL-CCNC: 11 U/L (ref 1–40)
AST SERPL-CCNC: 11 U/L (ref 1–40)
AST SERPL-CCNC: 12 U/L (ref 1–40)
AST SERPL-CCNC: 14 U/L (ref 1–40)
AST SERPL-CCNC: 16 U/L (ref 1–40)
AST SERPL-CCNC: 20 U/L (ref 1–40)
AST SERPL-CCNC: 9 U/L (ref 1–40)
ATMOSPHERIC PRESS: ABNORMAL MM[HG]
ATMOSPHERIC PRESS: ABNORMAL MM[HG]
ATMOSPHERIC PRESS: NORMAL MM[HG]
B PARAPERT DNA SPEC QL NAA+PROBE: NOT DETECTED
B PERT DNA SPEC QL NAA+PROBE: NOT DETECTED
BACTERIA BLD CULT: ABNORMAL
BACTERIA FLD CULT: NORMAL
BACTERIA ISLT: NORMAL
BACTERIA ISLT: NORMAL
BACTERIA SPEC AEROBE CULT: ABNORMAL
BACTERIA SPEC AEROBE CULT: NORMAL
BACTERIA SPEC RESP CULT: ABNORMAL
BACTERIA SPEC RESP CULT: ABNORMAL
BACTERIA UR QL AUTO: ABNORMAL /HPF
BACTERIA UR QL AUTO: ABNORMAL /HPF
BASE DEFICIT: ABNORMAL
BASE EXCESS BLDA CALC-SCNC: -2.9 MMOL/L (ref 0–3)
BASE EXCESS BLDA CALC-SCNC: -3.9 MMOL/L (ref 0–3)
BASE EXCESS BLDA CALC-SCNC: 0.7 MMOL/L (ref 0–3)
BASE EXCESS BLDV CALC-SCNC: ABNORMAL MMOL/L
BASOPHILS # BLD AUTO: 0 10*3/MM3 (ref 0–0.2)
BASOPHILS # BLD AUTO: 0.1 10*3/MM3 (ref 0–0.2)
BASOPHILS # BLD AUTO: 0.2 10*3/MM3 (ref 0–0.2)
BASOPHILS # BLD AUTO: 0.2 10*3/MM3 (ref 0–0.2)
BASOPHILS # BLD MANUAL: 0.05 10*3/MM3 (ref 0–0.2)
BASOPHILS # BLD MANUAL: 0.13 10*3/MM3 (ref 0–0.2)
BASOPHILS NFR BLD AUTO: 0.3 % (ref 0–1.5)
BASOPHILS NFR BLD AUTO: 0.3 % (ref 0–1.5)
BASOPHILS NFR BLD AUTO: 0.4 % (ref 0–1.5)
BASOPHILS NFR BLD AUTO: 0.5 % (ref 0–1.5)
BASOPHILS NFR BLD AUTO: 0.6 % (ref 0–1.5)
BASOPHILS NFR BLD AUTO: 0.6 % (ref 0–1.5)
BASOPHILS NFR BLD AUTO: 0.7 % (ref 0–1.5)
BASOPHILS NFR BLD AUTO: 0.8 % (ref 0–1.5)
BASOPHILS NFR BLD AUTO: 0.9 % (ref 0–1.5)
BASOPHILS NFR BLD AUTO: 1 % (ref 0–1.5)
BASOPHILS NFR BLD AUTO: 1.1 % (ref 0–1.5)
BASOPHILS NFR BLD AUTO: 1.2 % (ref 0–1.5)
BASOPHILS NFR BLD AUTO: 1.3 % (ref 0–1.5)
BASOPHILS NFR BLD AUTO: 1.4 % (ref 0–1.5)
BASOPHILS NFR BLD AUTO: 1.5 % (ref 0–1.5)
BASOPHILS NFR BLD AUTO: 1.7 % (ref 0–1.5)
BASOPHILS NFR BLD AUTO: 1.8 % (ref 0–1.5)
BASOPHILS NFR BLD AUTO: 2 % (ref 0–1.5)
BASOPHILS NFR BLD AUTO: 2.5 % (ref 0–1.5)
BASOPHILS NFR BLD AUTO: 2.7 % (ref 0–1.5)
BASOPHILS NFR BLD MANUAL: 1 % (ref 0–1.5)
BASOPHILS NFR BLD MANUAL: 2 % (ref 0–1.5)
BB HOLD TUBE: NORMAL
BDY SITE: ABNORMAL
BDY SITE: ABNORMAL
BDY SITE: NORMAL
BH CV ECHO MEAS - ACS: 1.87 CM
BH CV ECHO MEAS - ACS: 1.91 CM
BH CV ECHO MEAS - AO MAX PG: 2.6 MMHG
BH CV ECHO MEAS - AO MAX PG: 3.6 MMHG
BH CV ECHO MEAS - AO MAX PG: 4.7 MMHG
BH CV ECHO MEAS - AO MEAN PG: 1.67 MMHG
BH CV ECHO MEAS - AO MEAN PG: 2.16 MMHG
BH CV ECHO MEAS - AO MEAN PG: 2.6 MMHG
BH CV ECHO MEAS - AO ROOT DIAM: 3.6 CM
BH CV ECHO MEAS - AO ROOT DIAM: 3.6 CM
BH CV ECHO MEAS - AO V2 MAX: 108.5 CM/SEC
BH CV ECHO MEAS - AO V2 MAX: 80.1 CM/SEC
BH CV ECHO MEAS - AO V2 MAX: 94.2 CM/SEC
BH CV ECHO MEAS - AO V2 VTI: 11.1 CM
BH CV ECHO MEAS - AO V2 VTI: 16.7 CM
BH CV ECHO MEAS - AO V2 VTI: 23.3 CM
BH CV ECHO MEAS - AVA(I,D): 2.17 CM2
BH CV ECHO MEAS - AVA(I,D): 2.48 CM2
BH CV ECHO MEAS - EDV(CUBED): 115.7 ML
BH CV ECHO MEAS - EDV(CUBED): 119.6 ML
BH CV ECHO MEAS - EDV(CUBED): 164.3 ML
BH CV ECHO MEAS - EDV(MOD-SP4): 107.9 ML
BH CV ECHO MEAS - EF(MOD-SP4): 21.9 %
BH CV ECHO MEAS - ESV(CUBED): 92.5 ML
BH CV ECHO MEAS - ESV(CUBED): 93.3 ML
BH CV ECHO MEAS - ESV(CUBED): 93.6 ML
BH CV ECHO MEAS - ESV(MOD-SP4): 84.3 ML
BH CV ECHO MEAS - FS: 17.2 %
BH CV ECHO MEAS - FS: 7.2 %
BH CV ECHO MEAS - FS: 7.9 %
BH CV ECHO MEAS - IVS/LVPW: 0.89 CM
BH CV ECHO MEAS - IVS/LVPW: 1 CM
BH CV ECHO MEAS - IVS/LVPW: 1.42 CM
BH CV ECHO MEAS - IVSD: 1.06 CM
BH CV ECHO MEAS - IVSD: 1.35 CM
BH CV ECHO MEAS - IVSD: 1.67 CM
BH CV ECHO MEAS - LA DIMENSION(2D): 4.2 CM
BH CV ECHO MEAS - LA DIMENSION(2D): 5 CM
BH CV ECHO MEAS - LV MASS(C)D: 246.4 GRAMS
BH CV ECHO MEAS - LV MASS(C)D: 264.9 GRAMS
BH CV ECHO MEAS - LV MASS(C)D: 292.1 GRAMS
BH CV ECHO MEAS - LV MAX PG: 1.23 MMHG
BH CV ECHO MEAS - LV MAX PG: 4 MMHG
BH CV ECHO MEAS - LV MEAN PG: 0.6 MMHG
BH CV ECHO MEAS - LV MEAN PG: 1.8 MMHG
BH CV ECHO MEAS - LV V1 MAX: 55.5 CM/SEC
BH CV ECHO MEAS - LV V1 MAX: 99.5 CM/SEC
BH CV ECHO MEAS - LV V1 VTI: 22.5 CM
BH CV ECHO MEAS - LV V1 VTI: 8.2 CM
BH CV ECHO MEAS - LVIDD: 4.9 CM
BH CV ECHO MEAS - LVIDD: 4.9 CM
BH CV ECHO MEAS - LVIDD: 5.5 CM
BH CV ECHO MEAS - LVIDS: 4.5 CM
BH CV ECHO MEAS - LVOT AREA: 2.6 CM2
BH CV ECHO MEAS - LVOT AREA: 2.9 CM2
BH CV ECHO MEAS - LVOT AREA: 3.5 CM2
BH CV ECHO MEAS - LVOT DIAM: 1.81 CM
BH CV ECHO MEAS - LVOT DIAM: 1.94 CM
BH CV ECHO MEAS - LVOT DIAM: 2.12 CM
BH CV ECHO MEAS - LVPWD: 1.18 CM
BH CV ECHO MEAS - LVPWD: 1.19 CM
BH CV ECHO MEAS - LVPWD: 1.35 CM
BH CV ECHO MEAS - MR MAX PG: 23.5 MMHG
BH CV ECHO MEAS - MR MAX PG: 71.6 MMHG
BH CV ECHO MEAS - MR MAX VEL: 242.4 CM/SEC
BH CV ECHO MEAS - MR MAX VEL: 419.1 CM/SEC
BH CV ECHO MEAS - MV A MAX VEL: 38.3 CM/SEC
BH CV ECHO MEAS - MV A MAX VEL: 65.9 CM/SEC
BH CV ECHO MEAS - MV DEC SLOPE: 398.4 CM/SEC2
BH CV ECHO MEAS - MV DEC SLOPE: 647.7 CM/SEC2
BH CV ECHO MEAS - MV DEC TIME: 0.11 MSEC
BH CV ECHO MEAS - MV DEC TIME: 0.18 MSEC
BH CV ECHO MEAS - MV DEC TIME: 0.22 MSEC
BH CV ECHO MEAS - MV E MAX VEL: 104.2 CM/SEC
BH CV ECHO MEAS - MV E MAX VEL: 113.4 CM/SEC
BH CV ECHO MEAS - MV E MAX VEL: 86.9 CM/SEC
BH CV ECHO MEAS - MV E/A: 1.32
BH CV ECHO MEAS - MV E/A: 3
BH CV ECHO MEAS - MV MAX PG: 2.6 MMHG
BH CV ECHO MEAS - MV MAX PG: 4.5 MMHG
BH CV ECHO MEAS - MV MAX PG: 6.7 MMHG
BH CV ECHO MEAS - MV MEAN PG: 1.07 MMHG
BH CV ECHO MEAS - MV MEAN PG: 2 MMHG
BH CV ECHO MEAS - MV MEAN PG: 3.3 MMHG
BH CV ECHO MEAS - MV V2 VTI: 17 CM
BH CV ECHO MEAS - MV V2 VTI: 23.1 CM
BH CV ECHO MEAS - MV V2 VTI: 27.4 CM
BH CV ECHO MEAS - MVA(VTI): 1.42 CM2
BH CV ECHO MEAS - MVA(VTI): 2.11 CM2
BH CV ECHO MEAS - PA ACC TIME: 0.03 SEC
BH CV ECHO MEAS - PA PR(ACCEL): 63.4 MMHG
BH CV ECHO MEAS - PA V2 MAX: 71.3 CM/SEC
BH CV ECHO MEAS - PA V2 MAX: 86.1 CM/SEC
BH CV ECHO MEAS - PA V2 MAX: 87.4 CM/SEC
BH CV ECHO MEAS - RV MAX PG: 0.53 MMHG
BH CV ECHO MEAS - RV MAX PG: 0.71 MMHG
BH CV ECHO MEAS - RV MAX PG: 1.3 MMHG
BH CV ECHO MEAS - RV V1 MAX: 36.6 CM/SEC
BH CV ECHO MEAS - RV V1 MAX: 42 CM/SEC
BH CV ECHO MEAS - RV V1 MAX: 57 CM/SEC
BH CV ECHO MEAS - RV V1 VTI: 12.4 CM
BH CV ECHO MEAS - RV V1 VTI: 4.1 CM
BH CV ECHO MEAS - RV V1 VTI: 4.2 CM
BH CV ECHO MEAS - RVDD: 3.2 CM
BH CV ECHO MEAS - RVDD: 3.5 CM
BH CV ECHO MEAS - RVDD: 3.7 CM
BH CV ECHO MEAS - RVSP: 50 MMHG
BH CV ECHO MEAS - SV(LVOT): 24.2 ML
BH CV ECHO MEAS - SV(LVOT): 57.9 ML
BH CV ECHO MEAS - SV(MOD-SP4): 23.6 ML
BH CV ECHO MEAS - TR MAX PG: 14 MMHG
BH CV ECHO MEAS - TR MAX PG: 27.7 MMHG
BH CV ECHO MEAS - TR MAX PG: 47.6 MMHG
BH CV ECHO MEAS - TR MAX VEL: 187.1 CM/SEC
BH CV ECHO MEAS - TR MAX VEL: 262.5 CM/SEC
BH CV ECHO MEAS - TR MAX VEL: 344.3 CM/SEC
BH CV VAS DIALYSIS ARTERIAL ANASTOMOSIS DIAMETER: 0.25 CM
BH CV VAS DIALYSIS ARTERIAL ANASTOMOSIS EDV: 220 CM/SEC
BH CV VAS DIALYSIS ARTERIAL ANASTOMOSIS EDV: 282 CM/SEC
BH CV VAS DIALYSIS ARTERIAL ANASTOMOSIS EDV: 306 CM/SEC
BH CV VAS DIALYSIS ARTERIAL ANASTOMOSIS PSV: 462 CM/SEC
BH CV VAS DIALYSIS ARTERIAL ANASTOMOSIS PSV: 606 CM/SEC
BH CV VAS DIALYSIS ARTERIAL ANASTOMOSIS PSV: 626 CM/SEC
BH CV VAS DIALYSIS CONDUIT DIST DEPTH: 0.29 CM
BH CV VAS DIALYSIS CONDUIT DIST DEPTH: 0.42 CM
BH CV VAS DIALYSIS CONDUIT DIST DEPTH: 0.62 CM
BH CV VAS DIALYSIS CONDUIT DIST DIAMETER: 0.45 CM
BH CV VAS DIALYSIS CONDUIT DIST DIAMETER: 0.46 CM
BH CV VAS DIALYSIS CONDUIT DIST DIAMETER: 0.67 CM
BH CV VAS DIALYSIS CONDUIT DIST EDV: 37 CM/SEC
BH CV VAS DIALYSIS CONDUIT DIST EDV: 62 CM/SEC
BH CV VAS DIALYSIS CONDUIT DIST EDV: 82 CM/SEC
BH CV VAS DIALYSIS CONDUIT DIST FLOW VOL: 268 ML/MIN
BH CV VAS DIALYSIS CONDUIT DIST FLOW VOL: 307 ML/MIN
BH CV VAS DIALYSIS CONDUIT DIST FLOW VOL: 671 ML/MIN
BH CV VAS DIALYSIS CONDUIT DIST PSV: 204 CM/SEC
BH CV VAS DIALYSIS CONDUIT DIST PSV: 69 CM/SEC
BH CV VAS DIALYSIS CONDUIT DIST PSV: 87 CM/SEC
BH CV VAS DIALYSIS CONDUIT MID DEPTH: 0.35 CM
BH CV VAS DIALYSIS CONDUIT MID DEPTH: 0.38 CM
BH CV VAS DIALYSIS CONDUIT MID DEPTH: 0.48 CM
BH CV VAS DIALYSIS CONDUIT MID DIAMETER: 0.35 CM
BH CV VAS DIALYSIS CONDUIT MID DIAMETER: 0.64 CM
BH CV VAS DIALYSIS CONDUIT MID DIAMETER: 0.69 CM
BH CV VAS DIALYSIS CONDUIT MID EDV: 27 CM/SEC
BH CV VAS DIALYSIS CONDUIT MID EDV: 46 CM/SEC
BH CV VAS DIALYSIS CONDUIT MID EDV: 51 CM/SEC
BH CV VAS DIALYSIS CONDUIT MID FLOW VOL: 225 ML/MIN
BH CV VAS DIALYSIS CONDUIT MID FLOW VOL: 378 ML/MIN
BH CV VAS DIALYSIS CONDUIT MID FLOW VOL: 435 ML/MIN
BH CV VAS DIALYSIS CONDUIT MID PSV: 108 CM/SEC
BH CV VAS DIALYSIS CONDUIT MID PSV: 54 CM/SEC
BH CV VAS DIALYSIS CONDUIT MID PSV: 79 CM/SEC
BH CV VAS DIALYSIS CONDUIT MID/DIST DEPTH: 0.39 CM
BH CV VAS DIALYSIS CONDUIT MID/DIST DIAMETER: 0.25 CM
BH CV VAS DIALYSIS CONDUIT MID/DIST EDV: 174 CM/SEC
BH CV VAS DIALYSIS CONDUIT MID/DIST FLOW VOL: 278 ML/MIN
BH CV VAS DIALYSIS CONDUIT MID/DIST PSV: 320 CM/SEC
BH CV VAS DIALYSIS CONDUIT PROX DEPTH: 0.13 CM
BH CV VAS DIALYSIS CONDUIT PROX DEPTH: 0.15 CM
BH CV VAS DIALYSIS CONDUIT PROX DEPTH: 0.29 CM
BH CV VAS DIALYSIS CONDUIT PROX DIAMETER: 0.66 CM
BH CV VAS DIALYSIS CONDUIT PROX DIAMETER: 0.94 CM
BH CV VAS DIALYSIS CONDUIT PROX DIAMETER: 1.13 CM
BH CV VAS DIALYSIS CONDUIT PROX EDV: 20 CM/SEC
BH CV VAS DIALYSIS CONDUIT PROX EDV: 48 CM/SEC
BH CV VAS DIALYSIS CONDUIT PROX EDV: 77 CM/SEC
BH CV VAS DIALYSIS CONDUIT PROX FLOW VOL: 332 ML/MIN
BH CV VAS DIALYSIS CONDUIT PROX FLOW VOL: 596 ML/MIN
BH CV VAS DIALYSIS CONDUIT PROX FLOW VOL: 978 ML/MIN
BH CV VAS DIALYSIS CONDUIT PROX PSV: 161 CM/SEC
BH CV VAS DIALYSIS CONDUIT PROX PSV: 79 CM/SEC
BH CV VAS DIALYSIS CONDUIT PROX PSV: 88 CM/SEC
BH CV VAS DIALYSIS LEFT BRANCH 1 DIAMETER: 0.29 CM
BH CV VAS DIALYSIS LEFT BRANCH 1 DIAMETER: 0.3 CM
BH CV VAS DIALYSIS LEFT BRANCH 2 DIAMETER: 0.55 CM
BH CV VAS DIALYSIS LEFT BRANCH 3 DIAMETER: 0.31 CM
BH CV VAS DIALYSIS PRE-INFLOW BRACHIAL DIAMETER: 0.46 CM
BH CV VAS DIALYSIS PRE-INFLOW BRACHIAL DIAMETER: 0.47 CM
BH CV VAS DIALYSIS PRE-INFLOW BRACHIAL DIAMETER: 0.52 CM
BH CV VAS DIALYSIS PRE-INFLOW BRACHIAL EDV: 145 CM/SEC
BH CV VAS DIALYSIS PRE-INFLOW BRACHIAL EDV: 44 CM/SEC
BH CV VAS DIALYSIS PRE-INFLOW BRACHIAL EDV: 51 CM/SEC
BH CV VAS DIALYSIS PRE-INFLOW BRACHIAL FLOW VOL: 453 ML/MIN
BH CV VAS DIALYSIS PRE-INFLOW BRACHIAL FLOW VOL: 576 ML/MIN
BH CV VAS DIALYSIS PRE-INFLOW BRACHIAL FLOW VOL: 826 ML/MIN
BH CV VAS DIALYSIS PRE-INFLOW BRACHIAL PSV: 133 CM/SEC
BH CV VAS DIALYSIS PRE-INFLOW BRACHIAL PSV: 155 CM/SEC
BH CV VAS DIALYSIS PRE-INFLOW BRACHIAL PSV: 325 CM/SEC
BH CV VAS DIALYSIS VENOUS OUTFLOW CEPHALIC ARCH DIAMETE: 0.46 CM
BH CV VAS DIALYSIS VENOUS OUTFLOW CEPHALIC ARCH DIAMETE: 0.71 CM
BH CV VAS DIALYSIS VENOUS OUTFLOW CEPHALIC ARCH EDV: 55 CM/SEC
BH CV VAS DIALYSIS VENOUS OUTFLOW CEPHALIC ARCH EDV: 92 CM/SEC
BH CV VAS DIALYSIS VENOUS OUTFLOW CEPHALIC ARCH PSV: 130 CM/SEC
BH CV VAS DIALYSIS VENOUS OUTFLOW CEPHALIC ARCH PSV: 156 CM/SEC
BH CV VAS DIALYSIS VENOUS OUTFLOW SUBCLAVIAN DIAMETER: 0.76 CM
BH CV VAS DIALYSIS VENOUS OUTFLOW SUBCLAVIAN DIAMETER: 0.84 CM
BH CV VAS DIALYSIS VENOUS OUTFLOW SUBCLAVIAN EDV: 39 CM/SEC
BH CV VAS DIALYSIS VENOUS OUTFLOW SUBCLAVIAN EDV: 74 CM/SEC
BH CV VAS DIALYSIS VENOUS OUTFLOW SUBCLAVIAN PSV: 148 CM/SEC
BH CV VAS DIALYSIS VENOUS OUTFLOW SUBCLAVIAN PSV: 70 CM/SEC
BH CV XLRA MEAS LEFT DIST CCA EDV: -16.2 CM/SEC
BH CV XLRA MEAS LEFT DIST CCA PSV: -59.3 CM/SEC
BH CV XLRA MEAS LEFT DIST ICA EDV: -52.1 CM/SEC
BH CV XLRA MEAS LEFT DIST ICA PSV: -195 CM/SEC
BH CV XLRA MEAS LEFT ICA/CCA RATIO: -3.2
BH CV XLRA MEAS LEFT PROX CCA EDV: 23.8 CM/SEC
BH CV XLRA MEAS LEFT PROX CCA PSV: 86.9 CM/SEC
BH CV XLRA MEAS LEFT PROX ECA PSV: -55.5 CM/SEC
BH CV XLRA MEAS LEFT PROX ICA EDV: -89.2 CM/SEC
BH CV XLRA MEAS LEFT PROX ICA PSV: -277 CM/SEC
BH CV XLRA MEAS LEFT PROX SCLA PSV: 131 CM/SEC
BH CV XLRA MEAS LEFT VERTEBRAL A PSV: 71.5 CM/SEC
BH CV XLRA MEAS RIGHT DIST CCA EDV: 17.2 CM/SEC
BH CV XLRA MEAS RIGHT DIST CCA PSV: 53.6 CM/SEC
BH CV XLRA MEAS RIGHT DIST ICA EDV: -38.4 CM/SEC
BH CV XLRA MEAS RIGHT DIST ICA PSV: -180 CM/SEC
BH CV XLRA MEAS RIGHT ICA/CCA RATIO: -3.2
BH CV XLRA MEAS RIGHT MID ICA EDV: -65.9 CM/SEC
BH CV XLRA MEAS RIGHT MID ICA PSV: -237 CM/SEC
BH CV XLRA MEAS RIGHT PROX CCA EDV: 16.2 CM/SEC
BH CV XLRA MEAS RIGHT PROX CCA PSV: 75.2 CM/SEC
BH CV XLRA MEAS RIGHT PROX ECA PSV: -71.3 CM/SEC
BH CV XLRA MEAS RIGHT PROX ICA EDV: -81.7 CM/SEC
BH CV XLRA MEAS RIGHT PROX ICA PSV: -244 CM/SEC
BH CV XLRA MEAS RIGHT PROX SCLA PSV: 104 CM/SEC
BH CV XLRA MEAS RIGHT VERTEBRAL A PSV: 46.2 CM/SEC
BILIRUB SERPL-MCNC: 0.3 MG/DL (ref 0–1.2)
BILIRUB SERPL-MCNC: 0.4 MG/DL (ref 0–1.2)
BILIRUB SERPL-MCNC: 0.6 MG/DL (ref 0–1.2)
BILIRUB SERPL-MCNC: 0.6 MG/DL (ref 0–1.2)
BILIRUB SERPL-MCNC: 0.7 MG/DL (ref 0–1.2)
BILIRUB UR QL STRIP: ABNORMAL
BILIRUB UR QL STRIP: ABNORMAL
BLD GP AB SCN SERPL QL: NEGATIVE
BOTTLE TYPE: ABNORMAL
BUN SERPL-MCNC: 10 MG/DL (ref 8–23)
BUN SERPL-MCNC: 11 MG/DL (ref 8–23)
BUN SERPL-MCNC: 11 MG/DL (ref 8–23)
BUN SERPL-MCNC: 12 MG/DL (ref 8–23)
BUN SERPL-MCNC: 12 MG/DL (ref 8–23)
BUN SERPL-MCNC: 13 MG/DL (ref 8–23)
BUN SERPL-MCNC: 15 MG/DL (ref 8–23)
BUN SERPL-MCNC: 15 MG/DL (ref 8–23)
BUN SERPL-MCNC: 16 MG/DL (ref 8–23)
BUN SERPL-MCNC: 17 MG/DL (ref 8–23)
BUN SERPL-MCNC: 17 MG/DL (ref 8–23)
BUN SERPL-MCNC: 18 MG/DL (ref 8–23)
BUN SERPL-MCNC: 19 MG/DL (ref 8–23)
BUN SERPL-MCNC: 20 MG/DL (ref 8–23)
BUN SERPL-MCNC: 21 MG/DL (ref 8–23)
BUN SERPL-MCNC: 21 MG/DL (ref 8–23)
BUN SERPL-MCNC: 22 MG/DL (ref 8–23)
BUN SERPL-MCNC: 22 MG/DL (ref 8–23)
BUN SERPL-MCNC: 24 MG/DL (ref 8–23)
BUN SERPL-MCNC: 24 MG/DL (ref 8–23)
BUN SERPL-MCNC: 25 MG/DL (ref 8–23)
BUN SERPL-MCNC: 26 MG/DL (ref 8–23)
BUN SERPL-MCNC: 27 MG/DL (ref 8–23)
BUN SERPL-MCNC: 28 MG/DL (ref 8–23)
BUN SERPL-MCNC: 29 MG/DL (ref 8–23)
BUN SERPL-MCNC: 30 MG/DL (ref 8–23)
BUN SERPL-MCNC: 30 MG/DL (ref 8–23)
BUN SERPL-MCNC: 33 MG/DL (ref 8–23)
BUN SERPL-MCNC: 34 MG/DL (ref 8–23)
BUN SERPL-MCNC: 36 MG/DL (ref 8–23)
BUN SERPL-MCNC: 40 MG/DL (ref 8–23)
BUN SERPL-MCNC: 42 MG/DL (ref 8–23)
BUN/CREAT SERPL: 10 (ref 7–25)
BUN/CREAT SERPL: 4.6 (ref 7–25)
BUN/CREAT SERPL: 4.8 (ref 7–25)
BUN/CREAT SERPL: 4.8 (ref 7–25)
BUN/CREAT SERPL: 4.9 (ref 7–25)
BUN/CREAT SERPL: 4.9 (ref 7–25)
BUN/CREAT SERPL: 5.1 (ref 7–25)
BUN/CREAT SERPL: 5.4 (ref 7–25)
BUN/CREAT SERPL: 5.5 (ref 7–25)
BUN/CREAT SERPL: 5.5 (ref 7–25)
BUN/CREAT SERPL: 5.7 (ref 7–25)
BUN/CREAT SERPL: 5.9 (ref 7–25)
BUN/CREAT SERPL: 6.2 (ref 7–25)
BUN/CREAT SERPL: 6.2 (ref 7–25)
BUN/CREAT SERPL: 6.4 (ref 7–25)
BUN/CREAT SERPL: 6.5 (ref 7–25)
BUN/CREAT SERPL: 6.6 (ref 7–25)
BUN/CREAT SERPL: 6.7 (ref 7–25)
BUN/CREAT SERPL: 6.8 (ref 7–25)
BUN/CREAT SERPL: 6.8 (ref 7–25)
BUN/CREAT SERPL: 7 (ref 7–25)
BUN/CREAT SERPL: 7.1 (ref 7–25)
BUN/CREAT SERPL: 7.3 (ref 7–25)
BUN/CREAT SERPL: 7.5 (ref 7–25)
BUN/CREAT SERPL: 7.6 (ref 7–25)
BUN/CREAT SERPL: 7.7 (ref 7–25)
BUN/CREAT SERPL: 7.8 (ref 7–25)
BUN/CREAT SERPL: 7.8 (ref 7–25)
BUN/CREAT SERPL: 7.9 (ref 7–25)
BUN/CREAT SERPL: 8.2 (ref 7–25)
BUN/CREAT SERPL: 8.3 (ref 7–25)
BUN/CREAT SERPL: 8.3 (ref 7–25)
BUN/CREAT SERPL: 8.4 (ref 7–25)
BUN/CREAT SERPL: 8.5 (ref 7–25)
BUN/CREAT SERPL: 8.5 (ref 7–25)
BUN/CREAT SERPL: 8.6 (ref 7–25)
BUN/CREAT SERPL: 8.7 (ref 7–25)
BUN/CREAT SERPL: 8.9 (ref 7–25)
BUN/CREAT SERPL: 9.6 (ref 7–25)
BUN/CREAT SERPL: 9.7 (ref 7–25)
BUN/CREAT SERPL: 9.8 (ref 7–25)
C PNEUM DNA NPH QL NAA+NON-PROBE: NOT DETECTED
C3 FRG RBC-MCNC: NORMAL
CA-I BLDA-SCNC: 1.14 MMOL/L (ref 1.15–1.33)
CA-I BLDA-SCNC: 1.23 MMOL/L (ref 1.12–1.32)
CA-I SERPL ISE-MCNC: 1.14 MMOL/L (ref 1.2–1.3)
CALCIUM SPEC-SCNC: 7.5 MG/DL (ref 8.6–10.5)
CALCIUM SPEC-SCNC: 7.6 MG/DL (ref 8.6–10.5)
CALCIUM SPEC-SCNC: 7.7 MG/DL (ref 8.6–10.5)
CALCIUM SPEC-SCNC: 7.8 MG/DL (ref 8.6–10.5)
CALCIUM SPEC-SCNC: 7.9 MG/DL (ref 8.6–10.5)
CALCIUM SPEC-SCNC: 8 MG/DL (ref 8.6–10.5)
CALCIUM SPEC-SCNC: 8.1 MG/DL (ref 8.6–10.5)
CALCIUM SPEC-SCNC: 8.2 MG/DL (ref 8.6–10.5)
CALCIUM SPEC-SCNC: 8.3 MG/DL (ref 8.6–10.5)
CALCIUM SPEC-SCNC: 8.4 MG/DL (ref 8.6–10.5)
CALCIUM SPEC-SCNC: 8.5 MG/DL (ref 8.6–10.5)
CALCIUM SPEC-SCNC: 8.5 MG/DL (ref 8.6–10.5)
CALCIUM SPEC-SCNC: 8.6 MG/DL (ref 8.6–10.5)
CALCIUM SPEC-SCNC: 8.7 MG/DL (ref 8.6–10.5)
CALCIUM SPEC-SCNC: 8.7 MG/DL (ref 8.6–10.5)
CALCIUM SPEC-SCNC: 9.1 MG/DL (ref 8.6–10.5)
CHLORIDE SERPL-SCNC: 100 MMOL/L (ref 98–107)
CHLORIDE SERPL-SCNC: 101 MMOL/L (ref 98–107)
CHLORIDE SERPL-SCNC: 102 MMOL/L (ref 98–107)
CHLORIDE SERPL-SCNC: 88 MMOL/L (ref 98–107)
CHLORIDE SERPL-SCNC: 92 MMOL/L (ref 98–107)
CHLORIDE SERPL-SCNC: 93 MMOL/L (ref 98–107)
CHLORIDE SERPL-SCNC: 93 MMOL/L (ref 98–107)
CHLORIDE SERPL-SCNC: 94 MMOL/L (ref 98–107)
CHLORIDE SERPL-SCNC: 95 MMOL/L (ref 98–107)
CHLORIDE SERPL-SCNC: 96 MMOL/L (ref 98–107)
CHLORIDE SERPL-SCNC: 97 MMOL/L (ref 98–107)
CHLORIDE SERPL-SCNC: 98 MMOL/L (ref 98–107)
CHLORIDE SERPL-SCNC: 98 MMOL/L (ref 98–107)
CHLORIDE SERPL-SCNC: 99 MMOL/L (ref 98–107)
CHOLEST SERPL-MCNC: 117 MG/DL (ref 0–200)
CHOLEST SERPL-MCNC: 129 MG/DL (ref 0–200)
CHOLEST SERPL-MCNC: 129 MG/DL (ref 0–200)
CLARITY UR: ABNORMAL
CLARITY UR: ABNORMAL
CO2 BLDA-SCNC: 26.2 MMOL/L (ref 22–29)
CO2 BLDA-SCNC: 26.5 MMOL/L (ref 22–29)
CO2 BLDA-SCNC: 28.1 MMOL/L (ref 22–29)
CO2 CONTENT VENOUS: ABNORMAL
CO2 SERPL-SCNC: 19 MMOL/L (ref 22–29)
CO2 SERPL-SCNC: 20 MMOL/L (ref 22–29)
CO2 SERPL-SCNC: 21 MMOL/L (ref 22–29)
CO2 SERPL-SCNC: 22 MMOL/L (ref 22–29)
CO2 SERPL-SCNC: 23 MMOL/L (ref 22–29)
CO2 SERPL-SCNC: 24 MMOL/L (ref 22–29)
CO2 SERPL-SCNC: 25 MMOL/L (ref 22–29)
CO2 SERPL-SCNC: 26 MMOL/L (ref 22–29)
CO2 SERPL-SCNC: 27 MMOL/L (ref 22–29)
CO2 SERPL-SCNC: 28 MMOL/L (ref 22–29)
CO2 SERPL-SCNC: 28 MMOL/L (ref 22–29)
CO2 SERPL-SCNC: 30 MMOL/L (ref 22–29)
CO2 SERPL-SCNC: 31 MMOL/L (ref 22–29)
CO2 SERPL-SCNC: 32 MMOL/L (ref 22–29)
CO2 SERPL-SCNC: 33 MMOL/L (ref 22–29)
COLOR FLD: YELLOW
COLOR UR: ABNORMAL
COLOR UR: ABNORMAL
CREAT BLDA-MCNC: 2.5 MG/DL (ref 0.6–1.3)
CREAT SERPL-MCNC: 1.92 MG/DL (ref 0.76–1.27)
CREAT SERPL-MCNC: 2.09 MG/DL (ref 0.76–1.27)
CREAT SERPL-MCNC: 2.18 MG/DL (ref 0.76–1.27)
CREAT SERPL-MCNC: 2.24 MG/DL (ref 0.76–1.27)
CREAT SERPL-MCNC: 2.25 MG/DL (ref 0.76–1.27)
CREAT SERPL-MCNC: 2.36 MG/DL (ref 0.76–1.27)
CREAT SERPL-MCNC: 2.38 MG/DL (ref 0.76–1.27)
CREAT SERPL-MCNC: 2.48 MG/DL (ref 0.76–1.27)
CREAT SERPL-MCNC: 2.5 MG/DL (ref 0.76–1.27)
CREAT SERPL-MCNC: 2.68 MG/DL (ref 0.76–1.27)
CREAT SERPL-MCNC: 2.74 MG/DL (ref 0.76–1.27)
CREAT SERPL-MCNC: 2.75 MG/DL (ref 0.76–1.27)
CREAT SERPL-MCNC: 2.77 MG/DL (ref 0.76–1.27)
CREAT SERPL-MCNC: 2.81 MG/DL (ref 0.76–1.27)
CREAT SERPL-MCNC: 2.83 MG/DL (ref 0.76–1.27)
CREAT SERPL-MCNC: 2.86 MG/DL (ref 0.76–1.27)
CREAT SERPL-MCNC: 2.87 MG/DL (ref 0.76–1.27)
CREAT SERPL-MCNC: 2.9 MG/DL (ref 0.76–1.27)
CREAT SERPL-MCNC: 2.9 MG/DL (ref 0.76–1.27)
CREAT SERPL-MCNC: 2.94 MG/DL (ref 0.76–1.27)
CREAT SERPL-MCNC: 3 MG/DL (ref 0.76–1.27)
CREAT SERPL-MCNC: 3.07 MG/DL (ref 0.76–1.27)
CREAT SERPL-MCNC: 3.11 MG/DL (ref 0.76–1.27)
CREAT SERPL-MCNC: 3.13 MG/DL (ref 0.76–1.27)
CREAT SERPL-MCNC: 3.38 MG/DL (ref 0.76–1.27)
CREAT SERPL-MCNC: 3.39 MG/DL (ref 0.76–1.27)
CREAT SERPL-MCNC: 3.41 MG/DL (ref 0.76–1.27)
CREAT SERPL-MCNC: 3.46 MG/DL (ref 0.76–1.27)
CREAT SERPL-MCNC: 3.47 MG/DL (ref 0.76–1.27)
CREAT SERPL-MCNC: 3.51 MG/DL (ref 0.76–1.27)
CREAT SERPL-MCNC: 3.55 MG/DL (ref 0.76–1.27)
CREAT SERPL-MCNC: 3.56 MG/DL (ref 0.76–1.27)
CREAT SERPL-MCNC: 3.56 MG/DL (ref 0.76–1.27)
CREAT SERPL-MCNC: 3.62 MG/DL (ref 0.76–1.27)
CREAT SERPL-MCNC: 3.64 MG/DL (ref 0.76–1.27)
CREAT SERPL-MCNC: 3.71 MG/DL (ref 0.76–1.27)
CREAT SERPL-MCNC: 3.72 MG/DL (ref 0.76–1.27)
CREAT SERPL-MCNC: 3.73 MG/DL (ref 0.76–1.27)
CREAT SERPL-MCNC: 3.96 MG/DL (ref 0.76–1.27)
CREAT SERPL-MCNC: 4.01 MG/DL (ref 0.76–1.27)
CREAT SERPL-MCNC: 4.27 MG/DL (ref 0.76–1.27)
CRP SERPL-MCNC: 3.03 MG/DL (ref 0–0.5)
CRP SERPL-MCNC: 3.12 MG/DL (ref 0–0.5)
CRP SERPL-MCNC: 4.03 MG/DL (ref 0–0.5)
CRP SERPL-MCNC: 4.17 MG/DL (ref 0–0.5)
D DIMER PPP FEU-MCNC: 0.98 MG/L (FEU) (ref 0–0.59)
D-LACTATE SERPL-SCNC: 0.9 MMOL/L (ref 0.5–2)
D-LACTATE SERPL-SCNC: 1.4 MMOL/L (ref 0.5–2)
D-LACTATE SERPL-SCNC: 1.7 MMOL/L (ref 0.5–2)
D-LACTATE SERPL-SCNC: 2 MMOL/L (ref 0.5–2)
DEPRECATED RDW RBC AUTO: 53.8 FL (ref 37–54)
DEPRECATED RDW RBC AUTO: 54.3 FL (ref 37–54)
DEPRECATED RDW RBC AUTO: 54.3 FL (ref 37–54)
DEPRECATED RDW RBC AUTO: 54.7 FL (ref 37–54)
DEPRECATED RDW RBC AUTO: 55.1 FL (ref 37–54)
DEPRECATED RDW RBC AUTO: 55.6 FL (ref 37–54)
DEPRECATED RDW RBC AUTO: 55.6 FL (ref 37–54)
DEPRECATED RDW RBC AUTO: 56 FL (ref 37–54)
DEPRECATED RDW RBC AUTO: 56.4 FL (ref 37–54)
DEPRECATED RDW RBC AUTO: 56.9 FL (ref 37–54)
DEPRECATED RDW RBC AUTO: 57.8 FL (ref 37–54)
DEPRECATED RDW RBC AUTO: 57.8 FL (ref 37–54)
DEPRECATED RDW RBC AUTO: 58.2 FL (ref 37–54)
DEPRECATED RDW RBC AUTO: 58.6 FL (ref 37–54)
DEPRECATED RDW RBC AUTO: 59.1 FL (ref 37–54)
DEPRECATED RDW RBC AUTO: 59.5 FL (ref 37–54)
DEPRECATED RDW RBC AUTO: 59.5 FL (ref 37–54)
DEPRECATED RDW RBC AUTO: 60.4 FL (ref 37–54)
DEPRECATED RDW RBC AUTO: 60.8 FL (ref 37–54)
DEPRECATED RDW RBC AUTO: 60.8 FL (ref 37–54)
DEPRECATED RDW RBC AUTO: 61.3 FL (ref 37–54)
DEPRECATED RDW RBC AUTO: 61.3 FL (ref 37–54)
DEPRECATED RDW RBC AUTO: 61.7 FL (ref 37–54)
DEPRECATED RDW RBC AUTO: 62.1 FL (ref 37–54)
EGFRCR SERPLBLD CKD-EPI 2021: 15.2 ML/MIN/1.73
EGFRCR SERPLBLD CKD-EPI 2021: 15.4 ML/MIN/1.73
EGFRCR SERPLBLD CKD-EPI 2021: 16.6 ML/MIN/1.73
EGFRCR SERPLBLD CKD-EPI 2021: 16.6 ML/MIN/1.73
EGFRCR SERPLBLD CKD-EPI 2021: 16.7 ML/MIN/1.73
EGFRCR SERPLBLD CKD-EPI 2021: 17.1 ML/MIN/1.73
EGFRCR SERPLBLD CKD-EPI 2021: 17.2 ML/MIN/1.73
EGFRCR SERPLBLD CKD-EPI 2021: 17.5 ML/MIN/1.73
EGFRCR SERPLBLD CKD-EPI 2021: 17.5 ML/MIN/1.73
EGFRCR SERPLBLD CKD-EPI 2021: 17.6 ML/MIN/1.73
EGFRCR SERPLBLD CKD-EPI 2021: 17.8 ML/MIN/1.73
EGFRCR SERPLBLD CKD-EPI 2021: 18.1 ML/MIN/1.73
EGFRCR SERPLBLD CKD-EPI 2021: 18.1 ML/MIN/1.73
EGFRCR SERPLBLD CKD-EPI 2021: 18.5 ML/MIN/1.73
EGFRCR SERPLBLD CKD-EPI 2021: 18.6 ML/MIN/1.73
EGFRCR SERPLBLD CKD-EPI 2021: 18.7 ML/MIN/1.73
EGFRCR SERPLBLD CKD-EPI 2021: 20.5 ML/MIN/1.73
EGFRCR SERPLBLD CKD-EPI 2021: 20.6 ML/MIN/1.73
EGFRCR SERPLBLD CKD-EPI 2021: 20.9 ML/MIN/1.73
EGFRCR SERPLBLD CKD-EPI 2021: 21.5 ML/MIN/1.73
EGFRCR SERPLBLD CKD-EPI 2021: 22.1 ML/MIN/1.73
EGFRCR SERPLBLD CKD-EPI 2021: 22.4 ML/MIN/1.73
EGFRCR SERPLBLD CKD-EPI 2021: 22.4 ML/MIN/1.73
EGFRCR SERPLBLD CKD-EPI 2021: 22.7 ML/MIN/1.73
EGFRCR SERPLBLD CKD-EPI 2021: 22.8 ML/MIN/1.73
EGFRCR SERPLBLD CKD-EPI 2021: 23.1 ML/MIN/1.73
EGFRCR SERPLBLD CKD-EPI 2021: 23.3 ML/MIN/1.73
EGFRCR SERPLBLD CKD-EPI 2021: 23.7 ML/MIN/1.73
EGFRCR SERPLBLD CKD-EPI 2021: 23.9 ML/MIN/1.73
EGFRCR SERPLBLD CKD-EPI 2021: 24 ML/MIN/1.73
EGFRCR SERPLBLD CKD-EPI 2021: 24.7 ML/MIN/1.73
EGFRCR SERPLBLD CKD-EPI 2021: 26.8 ML/MIN/1.73
EGFRCR SERPLBLD CKD-EPI 2021: 26.8 ML/MIN/1.73
EGFRCR SERPLBLD CKD-EPI 2021: 27.1 ML/MIN/1.73
EGFRCR SERPLBLD CKD-EPI 2021: 28.4 ML/MIN/1.73
EGFRCR SERPLBLD CKD-EPI 2021: 28.7 ML/MIN/1.73
EGFRCR SERPLBLD CKD-EPI 2021: 30.4 ML/MIN/1.73
EGFRCR SERPLBLD CKD-EPI 2021: 30.6 ML/MIN/1.73
EGFRCR SERPLBLD CKD-EPI 2021: 31.6 ML/MIN/1.73
EGFRCR SERPLBLD CKD-EPI 2021: 33.2 ML/MIN/1.73
EGFRCR SERPLBLD CKD-EPI 2021: 36.8 ML/MIN/1.73
EOSINOPHIL # BLD AUTO: 0 10*3/MM3 (ref 0–0.4)
EOSINOPHIL # BLD AUTO: 0.1 10*3/MM3 (ref 0–0.4)
EOSINOPHIL # BLD AUTO: 0.2 10*3/MM3 (ref 0–0.4)
EOSINOPHIL # BLD MANUAL: 0.1 10*3/MM3 (ref 0–0.4)
EOSINOPHIL # BLD MANUAL: 0.23 10*3/MM3 (ref 0–0.4)
EOSINOPHIL # BLD MANUAL: 0.35 10*3/MM3 (ref 0–0.4)
EOSINOPHIL NFR BLD AUTO: 0 % (ref 0.3–6.2)
EOSINOPHIL NFR BLD AUTO: 0 % (ref 0.3–6.2)
EOSINOPHIL NFR BLD AUTO: 0.1 % (ref 0.3–6.2)
EOSINOPHIL NFR BLD AUTO: 0.1 % (ref 0.3–6.2)
EOSINOPHIL NFR BLD AUTO: 0.2 % (ref 0.3–6.2)
EOSINOPHIL NFR BLD AUTO: 0.5 % (ref 0.3–6.2)
EOSINOPHIL NFR BLD AUTO: 0.6 % (ref 0.3–6.2)
EOSINOPHIL NFR BLD AUTO: 0.6 % (ref 0.3–6.2)
EOSINOPHIL NFR BLD AUTO: 0.9 % (ref 0.3–6.2)
EOSINOPHIL NFR BLD AUTO: 1 % (ref 0.3–6.2)
EOSINOPHIL NFR BLD AUTO: 1.1 % (ref 0.3–6.2)
EOSINOPHIL NFR BLD AUTO: 1.2 % (ref 0.3–6.2)
EOSINOPHIL NFR BLD AUTO: 1.2 % (ref 0.3–6.2)
EOSINOPHIL NFR BLD AUTO: 1.4 % (ref 0.3–6.2)
EOSINOPHIL NFR BLD AUTO: 1.5 % (ref 0.3–6.2)
EOSINOPHIL NFR BLD AUTO: 1.6 % (ref 0.3–6.2)
EOSINOPHIL NFR BLD AUTO: 1.8 % (ref 0.3–6.2)
EOSINOPHIL NFR BLD AUTO: 1.9 % (ref 0.3–6.2)
EOSINOPHIL NFR BLD AUTO: 2 % (ref 0.3–6.2)
EOSINOPHIL NFR BLD AUTO: 2.1 % (ref 0.3–6.2)
EOSINOPHIL NFR BLD AUTO: 2.2 % (ref 0.3–6.2)
EOSINOPHIL NFR BLD AUTO: 2.8 % (ref 0.3–6.2)
EOSINOPHIL NFR BLD AUTO: 2.9 % (ref 0.3–6.2)
EOSINOPHIL NFR BLD AUTO: 3.1 % (ref 0.3–6.2)
EOSINOPHIL NFR BLD AUTO: 3.4 % (ref 0.3–6.2)
EOSINOPHIL NFR BLD AUTO: 4.5 % (ref 0.3–6.2)
EOSINOPHIL NFR BLD MANUAL: 2 % (ref 0.3–6.2)
EOSINOPHIL NFR BLD MANUAL: 4 % (ref 0.3–6.2)
EOSINOPHIL NFR BLD MANUAL: 6 % (ref 0.3–6.2)
ERYTHROCYTE [DISTWIDTH] IN BLOOD BY AUTOMATED COUNT: 16.2 % (ref 12.3–15.4)
ERYTHROCYTE [DISTWIDTH] IN BLOOD BY AUTOMATED COUNT: 16.3 % (ref 12.3–15.4)
ERYTHROCYTE [DISTWIDTH] IN BLOOD BY AUTOMATED COUNT: 16.4 % (ref 12.3–15.4)
ERYTHROCYTE [DISTWIDTH] IN BLOOD BY AUTOMATED COUNT: 16.5 % (ref 12.3–15.4)
ERYTHROCYTE [DISTWIDTH] IN BLOOD BY AUTOMATED COUNT: 16.6 % (ref 12.3–15.4)
ERYTHROCYTE [DISTWIDTH] IN BLOOD BY AUTOMATED COUNT: 16.7 % (ref 12.3–15.4)
ERYTHROCYTE [DISTWIDTH] IN BLOOD BY AUTOMATED COUNT: 16.8 % (ref 12.3–15.4)
ERYTHROCYTE [DISTWIDTH] IN BLOOD BY AUTOMATED COUNT: 16.9 % (ref 12.3–15.4)
ERYTHROCYTE [DISTWIDTH] IN BLOOD BY AUTOMATED COUNT: 17 % (ref 12.3–15.4)
ERYTHROCYTE [DISTWIDTH] IN BLOOD BY AUTOMATED COUNT: 17.1 % (ref 12.3–15.4)
ERYTHROCYTE [DISTWIDTH] IN BLOOD BY AUTOMATED COUNT: 17.2 % (ref 12.3–15.4)
ERYTHROCYTE [DISTWIDTH] IN BLOOD BY AUTOMATED COUNT: 17.3 % (ref 12.3–15.4)
ERYTHROCYTE [DISTWIDTH] IN BLOOD BY AUTOMATED COUNT: 17.4 % (ref 12.3–15.4)
ERYTHROCYTE [DISTWIDTH] IN BLOOD BY AUTOMATED COUNT: 17.5 % (ref 12.3–15.4)
ERYTHROCYTE [DISTWIDTH] IN BLOOD BY AUTOMATED COUNT: 17.5 % (ref 12.3–15.4)
ERYTHROCYTE [DISTWIDTH] IN BLOOD BY AUTOMATED COUNT: 17.6 % (ref 12.3–15.4)
ERYTHROCYTE [DISTWIDTH] IN BLOOD BY AUTOMATED COUNT: 17.7 % (ref 12.3–15.4)
ERYTHROCYTE [DISTWIDTH] IN BLOOD BY AUTOMATED COUNT: 17.8 % (ref 12.3–15.4)
ERYTHROCYTE [DISTWIDTH] IN BLOOD BY AUTOMATED COUNT: 17.9 % (ref 12.3–15.4)
ERYTHROCYTE [DISTWIDTH] IN BLOOD BY AUTOMATED COUNT: 18 % (ref 12.3–15.4)
FLUAV SUBTYP SPEC NAA+PROBE: NOT DETECTED
FLUBV RNA ISLT QL NAA+PROBE: NOT DETECTED
FUNGUS WND CULT: NORMAL
GFR SERPL CREATININE-BSD FRML MDRD: 14 ML/MIN/1.73
GLOBULIN UR ELPH-MCNC: 2.4 GM/DL
GLOBULIN UR ELPH-MCNC: 2.5 GM/DL
GLOBULIN UR ELPH-MCNC: 2.6 GM/DL
GLOBULIN UR ELPH-MCNC: 2.8 GM/DL
GLOBULIN UR ELPH-MCNC: 2.8 GM/DL
GLOBULIN UR ELPH-MCNC: 2.9 GM/DL
GLOBULIN UR ELPH-MCNC: 2.9 GM/DL
GLOBULIN UR ELPH-MCNC: 3 GM/DL
GLOBULIN UR ELPH-MCNC: 3 GM/DL
GLOBULIN UR ELPH-MCNC: 3.1 GM/DL
GLOBULIN UR ELPH-MCNC: 3.1 GM/DL
GLOBULIN UR ELPH-MCNC: 3.3 GM/DL
GLUCOSE BLDC GLUCOMTR-MCNC: 100 MG/DL (ref 70–105)
GLUCOSE BLDC GLUCOMTR-MCNC: 101 MG/DL (ref 70–105)
GLUCOSE BLDC GLUCOMTR-MCNC: 102 MG/DL (ref 70–105)
GLUCOSE BLDC GLUCOMTR-MCNC: 104 MG/DL (ref 70–105)
GLUCOSE BLDC GLUCOMTR-MCNC: 105 MG/DL (ref 70–105)
GLUCOSE BLDC GLUCOMTR-MCNC: 105 MG/DL (ref 70–105)
GLUCOSE BLDC GLUCOMTR-MCNC: 106 MG/DL (ref 70–105)
GLUCOSE BLDC GLUCOMTR-MCNC: 107 MG/DL (ref 70–105)
GLUCOSE BLDC GLUCOMTR-MCNC: 108 MG/DL (ref 70–105)
GLUCOSE BLDC GLUCOMTR-MCNC: 109 MG/DL (ref 70–105)
GLUCOSE BLDC GLUCOMTR-MCNC: 110 MG/DL (ref 70–105)
GLUCOSE BLDC GLUCOMTR-MCNC: 111 MG/DL (ref 70–105)
GLUCOSE BLDC GLUCOMTR-MCNC: 112 MG/DL (ref 70–105)
GLUCOSE BLDC GLUCOMTR-MCNC: 112 MG/DL (ref 70–105)
GLUCOSE BLDC GLUCOMTR-MCNC: 113 MG/DL (ref 70–105)
GLUCOSE BLDC GLUCOMTR-MCNC: 116 MG/DL (ref 70–105)
GLUCOSE BLDC GLUCOMTR-MCNC: 116 MG/DL (ref 70–105)
GLUCOSE BLDC GLUCOMTR-MCNC: 117 MG/DL (ref 70–105)
GLUCOSE BLDC GLUCOMTR-MCNC: 118 MG/DL (ref 70–105)
GLUCOSE BLDC GLUCOMTR-MCNC: 119 MG/DL (ref 70–105)
GLUCOSE BLDC GLUCOMTR-MCNC: 120 MG/DL (ref 70–105)
GLUCOSE BLDC GLUCOMTR-MCNC: 121 MG/DL (ref 70–105)
GLUCOSE BLDC GLUCOMTR-MCNC: 122 MG/DL (ref 70–105)
GLUCOSE BLDC GLUCOMTR-MCNC: 122 MG/DL (ref 70–105)
GLUCOSE BLDC GLUCOMTR-MCNC: 123 MG/DL (ref 70–105)
GLUCOSE BLDC GLUCOMTR-MCNC: 123 MG/DL (ref 70–105)
GLUCOSE BLDC GLUCOMTR-MCNC: 124 MG/DL (ref 70–105)
GLUCOSE BLDC GLUCOMTR-MCNC: 124 MG/DL (ref 70–105)
GLUCOSE BLDC GLUCOMTR-MCNC: 125 MG/DL (ref 70–105)
GLUCOSE BLDC GLUCOMTR-MCNC: 126 MG/DL (ref 70–105)
GLUCOSE BLDC GLUCOMTR-MCNC: 128 MG/DL (ref 70–105)
GLUCOSE BLDC GLUCOMTR-MCNC: 128 MG/DL (ref 70–105)
GLUCOSE BLDC GLUCOMTR-MCNC: 129 MG/DL (ref 70–105)
GLUCOSE BLDC GLUCOMTR-MCNC: 130 MG/DL (ref 70–105)
GLUCOSE BLDC GLUCOMTR-MCNC: 130 MG/DL (ref 70–105)
GLUCOSE BLDC GLUCOMTR-MCNC: 131 MG/DL (ref 70–105)
GLUCOSE BLDC GLUCOMTR-MCNC: 132 MG/DL (ref 70–105)
GLUCOSE BLDC GLUCOMTR-MCNC: 133 MG/DL (ref 70–105)
GLUCOSE BLDC GLUCOMTR-MCNC: 134 MG/DL (ref 70–105)
GLUCOSE BLDC GLUCOMTR-MCNC: 134 MG/DL (ref 70–105)
GLUCOSE BLDC GLUCOMTR-MCNC: 137 MG/DL (ref 70–105)
GLUCOSE BLDC GLUCOMTR-MCNC: 138 MG/DL (ref 70–105)
GLUCOSE BLDC GLUCOMTR-MCNC: 139 MG/DL (ref 70–105)
GLUCOSE BLDC GLUCOMTR-MCNC: 139 MG/DL (ref 70–105)
GLUCOSE BLDC GLUCOMTR-MCNC: 140 MG/DL (ref 70–105)
GLUCOSE BLDC GLUCOMTR-MCNC: 140 MG/DL (ref 70–105)
GLUCOSE BLDC GLUCOMTR-MCNC: 142 MG/DL (ref 70–105)
GLUCOSE BLDC GLUCOMTR-MCNC: 142 MG/DL (ref 70–105)
GLUCOSE BLDC GLUCOMTR-MCNC: 143 MG/DL (ref 70–105)
GLUCOSE BLDC GLUCOMTR-MCNC: 145 MG/DL (ref 70–105)
GLUCOSE BLDC GLUCOMTR-MCNC: 146 MG/DL (ref 70–105)
GLUCOSE BLDC GLUCOMTR-MCNC: 146 MG/DL (ref 70–105)
GLUCOSE BLDC GLUCOMTR-MCNC: 147 MG/DL (ref 70–105)
GLUCOSE BLDC GLUCOMTR-MCNC: 147 MG/DL (ref 70–105)
GLUCOSE BLDC GLUCOMTR-MCNC: 148 MG/DL (ref 70–105)
GLUCOSE BLDC GLUCOMTR-MCNC: 149 MG/DL (ref 70–105)
GLUCOSE BLDC GLUCOMTR-MCNC: 152 MG/DL (ref 70–105)
GLUCOSE BLDC GLUCOMTR-MCNC: 153 MG/DL (ref 70–105)
GLUCOSE BLDC GLUCOMTR-MCNC: 153 MG/DL (ref 70–105)
GLUCOSE BLDC GLUCOMTR-MCNC: 156 MG/DL (ref 70–105)
GLUCOSE BLDC GLUCOMTR-MCNC: 156 MG/DL (ref 70–105)
GLUCOSE BLDC GLUCOMTR-MCNC: 157 MG/DL (ref 70–105)
GLUCOSE BLDC GLUCOMTR-MCNC: 161 MG/DL (ref 70–105)
GLUCOSE BLDC GLUCOMTR-MCNC: 163 MG/DL (ref 70–105)
GLUCOSE BLDC GLUCOMTR-MCNC: 163 MG/DL (ref 70–105)
GLUCOSE BLDC GLUCOMTR-MCNC: 164 MG/DL (ref 70–105)
GLUCOSE BLDC GLUCOMTR-MCNC: 165 MG/DL (ref 70–105)
GLUCOSE BLDC GLUCOMTR-MCNC: 167 MG/DL (ref 70–105)
GLUCOSE BLDC GLUCOMTR-MCNC: 173 MG/DL (ref 70–105)
GLUCOSE BLDC GLUCOMTR-MCNC: 179 MG/DL (ref 70–105)
GLUCOSE BLDC GLUCOMTR-MCNC: 184 MG/DL (ref 74–100)
GLUCOSE BLDC GLUCOMTR-MCNC: 184 MG/DL (ref 74–100)
GLUCOSE BLDC GLUCOMTR-MCNC: 189 MG/DL (ref 70–105)
GLUCOSE BLDC GLUCOMTR-MCNC: 190 MG/DL (ref 70–105)
GLUCOSE BLDC GLUCOMTR-MCNC: 194 MG/DL (ref 70–105)
GLUCOSE BLDC GLUCOMTR-MCNC: 196 MG/DL (ref 70–105)
GLUCOSE BLDC GLUCOMTR-MCNC: 198 MG/DL (ref 70–105)
GLUCOSE BLDC GLUCOMTR-MCNC: 203 MG/DL (ref 70–105)
GLUCOSE BLDC GLUCOMTR-MCNC: 210 MG/DL (ref 70–105)
GLUCOSE BLDC GLUCOMTR-MCNC: 217 MG/DL (ref 70–105)
GLUCOSE BLDC GLUCOMTR-MCNC: 221 MG/DL (ref 70–105)
GLUCOSE BLDC GLUCOMTR-MCNC: 224 MG/DL (ref 70–105)
GLUCOSE BLDC GLUCOMTR-MCNC: 237 MG/DL (ref 70–105)
GLUCOSE BLDC GLUCOMTR-MCNC: 242 MG/DL (ref 70–105)
GLUCOSE BLDC GLUCOMTR-MCNC: 248 MG/DL (ref 70–105)
GLUCOSE BLDC GLUCOMTR-MCNC: 39 MG/DL (ref 70–105)
GLUCOSE BLDC GLUCOMTR-MCNC: 56 MG/DL (ref 70–105)
GLUCOSE BLDC GLUCOMTR-MCNC: 57 MG/DL (ref 70–105)
GLUCOSE BLDC GLUCOMTR-MCNC: 62 MG/DL (ref 70–105)
GLUCOSE BLDC GLUCOMTR-MCNC: 64 MG/DL (ref 70–105)
GLUCOSE BLDC GLUCOMTR-MCNC: 64 MG/DL (ref 70–105)
GLUCOSE BLDC GLUCOMTR-MCNC: 66 MG/DL (ref 70–105)
GLUCOSE BLDC GLUCOMTR-MCNC: 67 MG/DL (ref 70–105)
GLUCOSE BLDC GLUCOMTR-MCNC: 69 MG/DL (ref 70–105)
GLUCOSE BLDC GLUCOMTR-MCNC: 74 MG/DL (ref 70–105)
GLUCOSE BLDC GLUCOMTR-MCNC: 75 MG/DL (ref 70–105)
GLUCOSE BLDC GLUCOMTR-MCNC: 77 MG/DL (ref 70–105)
GLUCOSE BLDC GLUCOMTR-MCNC: 79 MG/DL (ref 70–105)
GLUCOSE BLDC GLUCOMTR-MCNC: 80 MG/DL (ref 70–105)
GLUCOSE BLDC GLUCOMTR-MCNC: 81 MG/DL (ref 70–105)
GLUCOSE BLDC GLUCOMTR-MCNC: 81 MG/DL (ref 70–105)
GLUCOSE BLDC GLUCOMTR-MCNC: 84 MG/DL (ref 70–105)
GLUCOSE BLDC GLUCOMTR-MCNC: 84 MG/DL (ref 70–105)
GLUCOSE BLDC GLUCOMTR-MCNC: 85 MG/DL (ref 70–105)
GLUCOSE BLDC GLUCOMTR-MCNC: 86 MG/DL (ref 70–105)
GLUCOSE BLDC GLUCOMTR-MCNC: 86 MG/DL (ref 70–105)
GLUCOSE BLDC GLUCOMTR-MCNC: 87 MG/DL (ref 70–105)
GLUCOSE BLDC GLUCOMTR-MCNC: 88 MG/DL (ref 70–105)
GLUCOSE BLDC GLUCOMTR-MCNC: 88 MG/DL (ref 70–105)
GLUCOSE BLDC GLUCOMTR-MCNC: 90 MG/DL (ref 70–105)
GLUCOSE BLDC GLUCOMTR-MCNC: 91 MG/DL (ref 70–105)
GLUCOSE BLDC GLUCOMTR-MCNC: 91 MG/DL (ref 70–105)
GLUCOSE BLDC GLUCOMTR-MCNC: 93 MG/DL (ref 70–105)
GLUCOSE BLDC GLUCOMTR-MCNC: 93 MG/DL (ref 70–105)
GLUCOSE BLDC GLUCOMTR-MCNC: 94 MG/DL (ref 70–105)
GLUCOSE BLDC GLUCOMTR-MCNC: 95 MG/DL (ref 70–105)
GLUCOSE BLDC GLUCOMTR-MCNC: 96 MG/DL (ref 70–105)
GLUCOSE BLDC GLUCOMTR-MCNC: 97 MG/DL (ref 70–105)
GLUCOSE BLDC GLUCOMTR-MCNC: 98 MG/DL (ref 70–105)
GLUCOSE BLDC GLUCOMTR-MCNC: 99 MG/DL (ref 70–105)
GLUCOSE FLD-MCNC: 182 MG/DL
GLUCOSE SERPL-MCNC: 106 MG/DL (ref 65–99)
GLUCOSE SERPL-MCNC: 110 MG/DL (ref 65–99)
GLUCOSE SERPL-MCNC: 111 MG/DL (ref 65–99)
GLUCOSE SERPL-MCNC: 111 MG/DL (ref 65–99)
GLUCOSE SERPL-MCNC: 116 MG/DL (ref 65–99)
GLUCOSE SERPL-MCNC: 119 MG/DL (ref 65–99)
GLUCOSE SERPL-MCNC: 122 MG/DL (ref 65–99)
GLUCOSE SERPL-MCNC: 125 MG/DL (ref 65–99)
GLUCOSE SERPL-MCNC: 126 MG/DL (ref 65–99)
GLUCOSE SERPL-MCNC: 126 MG/DL (ref 65–99)
GLUCOSE SERPL-MCNC: 128 MG/DL (ref 65–99)
GLUCOSE SERPL-MCNC: 132 MG/DL (ref 65–99)
GLUCOSE SERPL-MCNC: 132 MG/DL (ref 65–99)
GLUCOSE SERPL-MCNC: 133 MG/DL (ref 65–99)
GLUCOSE SERPL-MCNC: 136 MG/DL (ref 65–99)
GLUCOSE SERPL-MCNC: 137 MG/DL (ref 65–99)
GLUCOSE SERPL-MCNC: 141 MG/DL (ref 65–99)
GLUCOSE SERPL-MCNC: 142 MG/DL (ref 65–99)
GLUCOSE SERPL-MCNC: 146 MG/DL (ref 65–99)
GLUCOSE SERPL-MCNC: 148 MG/DL (ref 65–99)
GLUCOSE SERPL-MCNC: 149 MG/DL (ref 65–99)
GLUCOSE SERPL-MCNC: 149 MG/DL (ref 65–99)
GLUCOSE SERPL-MCNC: 156 MG/DL (ref 65–99)
GLUCOSE SERPL-MCNC: 158 MG/DL (ref 65–99)
GLUCOSE SERPL-MCNC: 162 MG/DL (ref 65–99)
GLUCOSE SERPL-MCNC: 164 MG/DL (ref 65–99)
GLUCOSE SERPL-MCNC: 167 MG/DL (ref 65–99)
GLUCOSE SERPL-MCNC: 167 MG/DL (ref 65–99)
GLUCOSE SERPL-MCNC: 171 MG/DL (ref 65–99)
GLUCOSE SERPL-MCNC: 175 MG/DL (ref 65–99)
GLUCOSE SERPL-MCNC: 178 MG/DL (ref 65–99)
GLUCOSE SERPL-MCNC: 179 MG/DL (ref 65–99)
GLUCOSE SERPL-MCNC: 208 MG/DL (ref 65–99)
GLUCOSE SERPL-MCNC: 80 MG/DL (ref 65–99)
GLUCOSE SERPL-MCNC: 83 MG/DL (ref 65–99)
GLUCOSE SERPL-MCNC: 92 MG/DL (ref 65–99)
GLUCOSE SERPL-MCNC: 93 MG/DL (ref 65–99)
GLUCOSE SERPL-MCNC: 97 MG/DL (ref 65–99)
GLUCOSE SERPL-MCNC: 99 MG/DL (ref 65–99)
GLUCOSE UR STRIP-MCNC: NEGATIVE MG/DL
GLUCOSE UR STRIP-MCNC: NEGATIVE MG/DL
GRAM STN SPEC: ABNORMAL
GRAM STN SPEC: NORMAL
GRAM STN SPEC: NORMAL
HADV DNA SPEC NAA+PROBE: NOT DETECTED
HBA1C MFR BLD: 6.2 % (ref 3.5–5.6)
HBA1C MFR BLD: 6.3 % (ref 3.5–5.6)
HBV CORE AB SERPL QL IA: NEGATIVE
HBV SURFACE AB SER RIA-ACNC: NORMAL
HBV SURFACE AB SER RIA-ACNC: NORMAL
HBV SURFACE AG SERPL QL IA: NORMAL
HCO3 BLDA-SCNC: 24.5 MMOL/L (ref 21–28)
HCO3 BLDA-SCNC: 24.8 MMOL/L (ref 21–28)
HCO3 BLDA-SCNC: 26.7 MMOL/L (ref 21–28)
HCO3 BLDV-SCNC: 34.7 MMOL/L (ref 23–28)
HCOV 229E RNA SPEC QL NAA+PROBE: NOT DETECTED
HCOV HKU1 RNA SPEC QL NAA+PROBE: NOT DETECTED
HCOV NL63 RNA SPEC QL NAA+PROBE: NOT DETECTED
HCOV OC43 RNA SPEC QL NAA+PROBE: NOT DETECTED
HCT VFR BLD AUTO: 23.3 % (ref 37.5–51)
HCT VFR BLD AUTO: 23.7 % (ref 37.5–51)
HCT VFR BLD AUTO: 23.8 % (ref 37.5–51)
HCT VFR BLD AUTO: 24 % (ref 37.5–51)
HCT VFR BLD AUTO: 26.3 % (ref 37.5–51)
HCT VFR BLD AUTO: 27.8 % (ref 37.5–51)
HCT VFR BLD AUTO: 31.6 % (ref 37.5–51)
HCT VFR BLD AUTO: 32.1 % (ref 37.5–51)
HCT VFR BLD AUTO: 32.1 % (ref 37.5–51)
HCT VFR BLD AUTO: 32.4 % (ref 37.5–51)
HCT VFR BLD AUTO: 32.7 % (ref 37.5–51)
HCT VFR BLD AUTO: 32.7 % (ref 37.5–51)
HCT VFR BLD AUTO: 32.9 % (ref 37.5–51)
HCT VFR BLD AUTO: 33 % (ref 37.5–51)
HCT VFR BLD AUTO: 33.1 % (ref 37.5–51)
HCT VFR BLD AUTO: 33.2 % (ref 37.5–51)
HCT VFR BLD AUTO: 33.3 % (ref 37.5–51)
HCT VFR BLD AUTO: 33.5 % (ref 37.5–51)
HCT VFR BLD AUTO: 33.5 % (ref 37.5–51)
HCT VFR BLD AUTO: 33.6 % (ref 37.5–51)
HCT VFR BLD AUTO: 33.9 % (ref 37.5–51)
HCT VFR BLD AUTO: 33.9 % (ref 37.5–51)
HCT VFR BLD AUTO: 34.3 % (ref 37.5–51)
HCT VFR BLD AUTO: 34.6 % (ref 37.5–51)
HCT VFR BLD AUTO: 34.7 % (ref 37.5–51)
HCT VFR BLD AUTO: 34.9 % (ref 37.5–51)
HCT VFR BLD AUTO: 35 % (ref 37.5–51)
HCT VFR BLD AUTO: 35.1 % (ref 37.5–51)
HCT VFR BLD AUTO: 35.1 % (ref 37.5–51)
HCT VFR BLD AUTO: 35.2 % (ref 37.5–51)
HCT VFR BLD AUTO: 35.3 % (ref 37.5–51)
HCT VFR BLD AUTO: 35.4 % (ref 37.5–51)
HCT VFR BLD AUTO: 35.5 % (ref 37.5–51)
HCT VFR BLD AUTO: 35.7 % (ref 37.5–51)
HCT VFR BLD AUTO: 35.7 % (ref 37.5–51)
HCT VFR BLD AUTO: 35.8 % (ref 37.5–51)
HCT VFR BLD AUTO: 35.8 % (ref 37.5–51)
HCT VFR BLD AUTO: 39.4 % (ref 37.5–51)
HCT VFR BLDA CALC: 35 % (ref 38–51)
HCT VFR BLDA CALC: 39 % (ref 38–51)
HDLC SERPL-MCNC: 47 MG/DL (ref 40–60)
HDLC SERPL-MCNC: 56 MG/DL (ref 40–60)
HDLC SERPL-MCNC: 58 MG/DL (ref 40–60)
HEMODILUTION: NO
HGB BLD-MCNC: 10 G/DL (ref 13–17.7)
HGB BLD-MCNC: 10.1 G/DL (ref 13–17.7)
HGB BLD-MCNC: 10.1 G/DL (ref 13–17.7)
HGB BLD-MCNC: 10.2 G/DL (ref 13–17.7)
HGB BLD-MCNC: 10.2 G/DL (ref 13–17.7)
HGB BLD-MCNC: 10.3 G/DL (ref 13–17.7)
HGB BLD-MCNC: 10.4 G/DL (ref 13–17.7)
HGB BLD-MCNC: 10.5 G/DL (ref 13–17.7)
HGB BLD-MCNC: 10.6 G/DL (ref 13–17.7)
HGB BLD-MCNC: 10.7 G/DL (ref 13–17.7)
HGB BLD-MCNC: 10.7 G/DL (ref 13–17.7)
HGB BLD-MCNC: 10.8 G/DL (ref 13–17.7)
HGB BLD-MCNC: 10.8 G/DL (ref 13–17.7)
HGB BLD-MCNC: 10.9 G/DL (ref 13–17.7)
HGB BLD-MCNC: 11 G/DL (ref 13–17.7)
HGB BLD-MCNC: 11 G/DL (ref 13–17.7)
HGB BLD-MCNC: 11.1 G/DL (ref 13–17.7)
HGB BLD-MCNC: 11.1 G/DL (ref 13–17.7)
HGB BLD-MCNC: 11.2 G/DL (ref 13–17.7)
HGB BLD-MCNC: 11.2 G/DL (ref 13–17.7)
HGB BLD-MCNC: 11.3 G/DL (ref 13–17.7)
HGB BLD-MCNC: 11.4 G/DL (ref 13–17.7)
HGB BLD-MCNC: 11.5 G/DL (ref 13–17.7)
HGB BLD-MCNC: 11.6 G/DL (ref 13–17.7)
HGB BLD-MCNC: 11.7 G/DL (ref 13–17.7)
HGB BLD-MCNC: 11.7 G/DL (ref 13–17.7)
HGB BLD-MCNC: 11.9 G/DL (ref 13–17.7)
HGB BLD-MCNC: 7.5 G/DL (ref 13–17.7)
HGB BLD-MCNC: 7.6 G/DL (ref 13–17.7)
HGB BLD-MCNC: 8 G/DL (ref 13–17.7)
HGB BLD-MCNC: 8.5 G/DL (ref 13–17.7)
HGB BLD-MCNC: 9.1 G/DL (ref 13–17.7)
HGB BLDA-MCNC: 11.9 G/DL (ref 12–17)
HGB BLDA-MCNC: 13.4 G/DL (ref 12–17)
HGB UR QL STRIP.AUTO: ABNORMAL
HGB UR QL STRIP.AUTO: ABNORMAL
HMPV RNA NPH QL NAA+NON-PROBE: NOT DETECTED
HOLD SPECIMEN: NORMAL
HPIV1 RNA ISLT QL NAA+PROBE: NOT DETECTED
HPIV2 RNA SPEC QL NAA+PROBE: NOT DETECTED
HPIV3 RNA NPH QL NAA+PROBE: NOT DETECTED
HPIV4 P GENE NPH QL NAA+PROBE: NOT DETECTED
HYALINE CASTS UR QL AUTO: ABNORMAL /LPF
HYALINE CASTS UR QL AUTO: ABNORMAL /LPF
INHALED O2 CONCENTRATION: 60 %
INHALED O2 CONCENTRATION: 70 %
INHALED O2 CONCENTRATION: <21 %
INR PPP: 1.03 (ref 0.93–1.1)
INR PPP: 1.09 (ref 0.93–1.1)
INR PPP: 1.12 (ref 0.93–1.1)
INR PPP: 1.16 (ref 0.93–1.1)
ISOLATED FROM: ABNORMAL
ISOLATED FROM: ABNORMAL
KETONES UR QL STRIP: ABNORMAL
KETONES UR QL STRIP: NEGATIVE
LAB AP CASE REPORT: NORMAL
LDH FLD-CCNC: 80 U/L
LDLC SERPL CALC-MCNC: 52 MG/DL (ref 0–100)
LDLC SERPL CALC-MCNC: 54 MG/DL (ref 0–100)
LDLC SERPL CALC-MCNC: 55 MG/DL (ref 0–100)
LDLC/HDLC SERPL: 0.92 {RATIO}
LDLC/HDLC SERPL: 0.96 {RATIO}
LDLC/HDLC SERPL: 1.07 {RATIO}
LEUKOCYTE ESTERASE UR QL STRIP.AUTO: ABNORMAL
LEUKOCYTE ESTERASE UR QL STRIP.AUTO: ABNORMAL
LIPASE SERPL-CCNC: 13 U/L (ref 13–60)
LV EF 2D ECHO EST: 25 %
LYMPHOCYTES # BLD AUTO: 0.3 10*3/MM3 (ref 0.7–3.1)
LYMPHOCYTES # BLD AUTO: 0.5 10*3/MM3 (ref 0.7–3.1)
LYMPHOCYTES # BLD AUTO: 0.7 10*3/MM3 (ref 0.7–3.1)
LYMPHOCYTES # BLD AUTO: 0.8 10*3/MM3 (ref 0.7–3.1)
LYMPHOCYTES # BLD AUTO: 0.9 10*3/MM3 (ref 0.7–3.1)
LYMPHOCYTES # BLD AUTO: 1 10*3/MM3 (ref 0.7–3.1)
LYMPHOCYTES # BLD AUTO: 1.1 10*3/MM3 (ref 0.7–3.1)
LYMPHOCYTES # BLD AUTO: 1.2 10*3/MM3 (ref 0.7–3.1)
LYMPHOCYTES # BLD AUTO: 1.3 10*3/MM3 (ref 0.7–3.1)
LYMPHOCYTES # BLD AUTO: 1.4 10*3/MM3 (ref 0.7–3.1)
LYMPHOCYTES # BLD AUTO: 1.5 10*3/MM3 (ref 0.7–3.1)
LYMPHOCYTES # BLD AUTO: 1.6 10*3/MM3 (ref 0.7–3.1)
LYMPHOCYTES # BLD AUTO: 1.7 10*3/MM3 (ref 0.7–3.1)
LYMPHOCYTES # BLD AUTO: 1.8 10*3/MM3 (ref 0.7–3.1)
LYMPHOCYTES # BLD AUTO: 1.9 10*3/MM3 (ref 0.7–3.1)
LYMPHOCYTES # BLD AUTO: 1.9 10*3/MM3 (ref 0.7–3.1)
LYMPHOCYTES # BLD MANUAL: 0.85 10*3/MM3 (ref 0.7–3.1)
LYMPHOCYTES # BLD MANUAL: 1.08 10*3/MM3 (ref 0.7–3.1)
LYMPHOCYTES # BLD MANUAL: 1.22 10*3/MM3 (ref 0.7–3.1)
LYMPHOCYTES # BLD MANUAL: 1.57 10*3/MM3 (ref 0.7–3.1)
LYMPHOCYTES # BLD MANUAL: 2.21 10*3/MM3 (ref 0.7–3.1)
LYMPHOCYTES NFR BLD AUTO: 10.6 % (ref 19.6–45.3)
LYMPHOCYTES NFR BLD AUTO: 12.1 % (ref 19.6–45.3)
LYMPHOCYTES NFR BLD AUTO: 13.8 % (ref 19.6–45.3)
LYMPHOCYTES NFR BLD AUTO: 15.3 % (ref 19.6–45.3)
LYMPHOCYTES NFR BLD AUTO: 16.4 % (ref 19.6–45.3)
LYMPHOCYTES NFR BLD AUTO: 16.9 % (ref 19.6–45.3)
LYMPHOCYTES NFR BLD AUTO: 17 % (ref 19.6–45.3)
LYMPHOCYTES NFR BLD AUTO: 17.5 % (ref 19.6–45.3)
LYMPHOCYTES NFR BLD AUTO: 17.6 % (ref 19.6–45.3)
LYMPHOCYTES NFR BLD AUTO: 17.7 % (ref 19.6–45.3)
LYMPHOCYTES NFR BLD AUTO: 17.9 % (ref 19.6–45.3)
LYMPHOCYTES NFR BLD AUTO: 18.5 % (ref 19.6–45.3)
LYMPHOCYTES NFR BLD AUTO: 18.6 % (ref 19.6–45.3)
LYMPHOCYTES NFR BLD AUTO: 18.7 % (ref 19.6–45.3)
LYMPHOCYTES NFR BLD AUTO: 19.3 % (ref 19.6–45.3)
LYMPHOCYTES NFR BLD AUTO: 19.6 % (ref 19.6–45.3)
LYMPHOCYTES NFR BLD AUTO: 19.7 % (ref 19.6–45.3)
LYMPHOCYTES NFR BLD AUTO: 19.8 % (ref 19.6–45.3)
LYMPHOCYTES NFR BLD AUTO: 19.8 % (ref 19.6–45.3)
LYMPHOCYTES NFR BLD AUTO: 20.6 % (ref 19.6–45.3)
LYMPHOCYTES NFR BLD AUTO: 22 % (ref 19.6–45.3)
LYMPHOCYTES NFR BLD AUTO: 22.6 % (ref 19.6–45.3)
LYMPHOCYTES NFR BLD AUTO: 23.4 % (ref 19.6–45.3)
LYMPHOCYTES NFR BLD AUTO: 23.6 % (ref 19.6–45.3)
LYMPHOCYTES NFR BLD AUTO: 24.3 % (ref 19.6–45.3)
LYMPHOCYTES NFR BLD AUTO: 25.7 % (ref 19.6–45.3)
LYMPHOCYTES NFR BLD AUTO: 26.1 % (ref 19.6–45.3)
LYMPHOCYTES NFR BLD AUTO: 27.1 % (ref 19.6–45.3)
LYMPHOCYTES NFR BLD AUTO: 28.7 % (ref 19.6–45.3)
LYMPHOCYTES NFR BLD AUTO: 28.9 % (ref 19.6–45.3)
LYMPHOCYTES NFR BLD AUTO: 29.5 % (ref 19.6–45.3)
LYMPHOCYTES NFR BLD AUTO: 6.6 % (ref 19.6–45.3)
LYMPHOCYTES NFR BLD AUTO: 7.3 % (ref 19.6–45.3)
LYMPHOCYTES NFR BLD AUTO: 7.8 % (ref 19.6–45.3)
LYMPHOCYTES NFR BLD AUTO: 9.6 % (ref 19.6–45.3)
LYMPHOCYTES NFR BLD MANUAL: 10 % (ref 5–12)
LYMPHOCYTES NFR BLD MANUAL: 7 % (ref 5–12)
LYMPHOCYTES NFR BLD MANUAL: 7 % (ref 5–12)
LYMPHOCYTES NFR FLD MANUAL: 57 %
M PNEUMO IGG SER IA-ACNC: NOT DETECTED
MAGNESIUM SERPL-MCNC: 1.7 MG/DL (ref 1.6–2.4)
MAGNESIUM SERPL-MCNC: 1.7 MG/DL (ref 1.6–2.4)
MAGNESIUM SERPL-MCNC: 1.8 MG/DL (ref 1.6–2.4)
MAGNESIUM SERPL-MCNC: 1.9 MG/DL (ref 1.6–2.4)
MAGNESIUM SERPL-MCNC: 2 MG/DL (ref 1.6–2.4)
MAGNESIUM SERPL-MCNC: 2.1 MG/DL (ref 1.6–2.4)
MAGNESIUM SERPL-MCNC: 2.1 MG/DL (ref 1.6–2.4)
MAGNESIUM SERPL-MCNC: 2.2 MG/DL (ref 1.6–2.4)
MAGNESIUM SERPL-MCNC: 2.3 MG/DL (ref 1.6–2.4)
MAXIMAL PREDICTED HEART RATE: 149 BPM
MCH RBC QN AUTO: 28.3 PG (ref 26.6–33)
MCH RBC QN AUTO: 28.6 PG (ref 26.6–33)
MCH RBC QN AUTO: 28.8 PG (ref 26.6–33)
MCH RBC QN AUTO: 28.8 PG (ref 26.6–33)
MCH RBC QN AUTO: 29 PG (ref 26.6–33)
MCH RBC QN AUTO: 29.1 PG (ref 26.6–33)
MCH RBC QN AUTO: 29.1 PG (ref 26.6–33)
MCH RBC QN AUTO: 29.2 PG (ref 26.6–33)
MCH RBC QN AUTO: 29.3 PG (ref 26.6–33)
MCH RBC QN AUTO: 29.4 PG (ref 26.6–33)
MCH RBC QN AUTO: 29.5 PG (ref 26.6–33)
MCH RBC QN AUTO: 29.6 PG (ref 26.6–33)
MCH RBC QN AUTO: 29.6 PG (ref 26.6–33)
MCH RBC QN AUTO: 29.8 PG (ref 26.6–33)
MCH RBC QN AUTO: 29.9 PG (ref 26.6–33)
MCH RBC QN AUTO: 30 PG (ref 26.6–33)
MCH RBC QN AUTO: 30 PG (ref 26.6–33)
MCH RBC QN AUTO: 30.1 PG (ref 26.6–33)
MCH RBC QN AUTO: 30.2 PG (ref 26.6–33)
MCH RBC QN AUTO: 30.3 PG (ref 26.6–33)
MCH RBC QN AUTO: 30.3 PG (ref 26.6–33)
MCH RBC QN AUTO: 30.4 PG (ref 26.6–33)
MCH RBC QN AUTO: 30.4 PG (ref 26.6–33)
MCH RBC QN AUTO: 30.5 PG (ref 26.6–33)
MCH RBC QN AUTO: 30.6 PG (ref 26.6–33)
MCH RBC QN AUTO: 30.7 PG (ref 26.6–33)
MCH RBC QN AUTO: 30.7 PG (ref 26.6–33)
MCH RBC QN AUTO: 30.8 PG (ref 26.6–33)
MCH RBC QN AUTO: 31.1 PG (ref 26.6–33)
MCH RBC QN AUTO: 31.2 PG (ref 26.6–33)
MCH RBC QN AUTO: 31.6 PG (ref 26.6–33)
MCH RBC QN AUTO: 31.8 PG (ref 26.6–33)
MCH RBC QN AUTO: 32 PG (ref 26.6–33)
MCHC RBC AUTO-ENTMCNC: 29.8 G/DL (ref 31.5–35.7)
MCHC RBC AUTO-ENTMCNC: 30.1 G/DL (ref 31.5–35.7)
MCHC RBC AUTO-ENTMCNC: 30.2 G/DL (ref 31.5–35.7)
MCHC RBC AUTO-ENTMCNC: 30.2 G/DL (ref 31.5–35.7)
MCHC RBC AUTO-ENTMCNC: 30.3 G/DL (ref 31.5–35.7)
MCHC RBC AUTO-ENTMCNC: 30.3 G/DL (ref 31.5–35.7)
MCHC RBC AUTO-ENTMCNC: 30.4 G/DL (ref 31.5–35.7)
MCHC RBC AUTO-ENTMCNC: 30.4 G/DL (ref 31.5–35.7)
MCHC RBC AUTO-ENTMCNC: 30.7 G/DL (ref 31.5–35.7)
MCHC RBC AUTO-ENTMCNC: 30.8 G/DL (ref 31.5–35.7)
MCHC RBC AUTO-ENTMCNC: 31 G/DL (ref 31.5–35.7)
MCHC RBC AUTO-ENTMCNC: 31 G/DL (ref 31.5–35.7)
MCHC RBC AUTO-ENTMCNC: 31.1 G/DL (ref 31.5–35.7)
MCHC RBC AUTO-ENTMCNC: 31.1 G/DL (ref 31.5–35.7)
MCHC RBC AUTO-ENTMCNC: 31.2 G/DL (ref 31.5–35.7)
MCHC RBC AUTO-ENTMCNC: 31.3 G/DL (ref 31.5–35.7)
MCHC RBC AUTO-ENTMCNC: 31.4 G/DL (ref 31.5–35.7)
MCHC RBC AUTO-ENTMCNC: 31.4 G/DL (ref 31.5–35.7)
MCHC RBC AUTO-ENTMCNC: 31.5 G/DL (ref 31.5–35.7)
MCHC RBC AUTO-ENTMCNC: 31.6 G/DL (ref 31.5–35.7)
MCHC RBC AUTO-ENTMCNC: 31.6 G/DL (ref 31.5–35.7)
MCHC RBC AUTO-ENTMCNC: 31.7 G/DL (ref 31.5–35.7)
MCHC RBC AUTO-ENTMCNC: 31.7 G/DL (ref 31.5–35.7)
MCHC RBC AUTO-ENTMCNC: 31.9 G/DL (ref 31.5–35.7)
MCHC RBC AUTO-ENTMCNC: 31.9 G/DL (ref 31.5–35.7)
MCHC RBC AUTO-ENTMCNC: 32 G/DL (ref 31.5–35.7)
MCHC RBC AUTO-ENTMCNC: 32 G/DL (ref 31.5–35.7)
MCHC RBC AUTO-ENTMCNC: 32.1 G/DL (ref 31.5–35.7)
MCHC RBC AUTO-ENTMCNC: 32.1 G/DL (ref 31.5–35.7)
MCHC RBC AUTO-ENTMCNC: 32.4 G/DL (ref 31.5–35.7)
MCHC RBC AUTO-ENTMCNC: 32.5 G/DL (ref 31.5–35.7)
MCHC RBC AUTO-ENTMCNC: 32.6 G/DL (ref 31.5–35.7)
MCHC RBC AUTO-ENTMCNC: 32.7 G/DL (ref 31.5–35.7)
MCHC RBC AUTO-ENTMCNC: 32.8 G/DL (ref 31.5–35.7)
MCHC RBC AUTO-ENTMCNC: 32.9 G/DL (ref 31.5–35.7)
MCHC RBC AUTO-ENTMCNC: 33 G/DL (ref 31.5–35.7)
MCHC RBC AUTO-ENTMCNC: 33.2 G/DL (ref 31.5–35.7)
MCHC RBC AUTO-ENTMCNC: 33.9 G/DL (ref 31.5–35.7)
MCV RBC AUTO: 91.4 FL (ref 79–97)
MCV RBC AUTO: 91.6 FL (ref 79–97)
MCV RBC AUTO: 91.7 FL (ref 79–97)
MCV RBC AUTO: 92.1 FL (ref 79–97)
MCV RBC AUTO: 92.4 FL (ref 79–97)
MCV RBC AUTO: 92.7 FL (ref 79–97)
MCV RBC AUTO: 92.9 FL (ref 79–97)
MCV RBC AUTO: 93 FL (ref 79–97)
MCV RBC AUTO: 93.4 FL (ref 79–97)
MCV RBC AUTO: 93.5 FL (ref 79–97)
MCV RBC AUTO: 93.5 FL (ref 79–97)
MCV RBC AUTO: 93.6 FL (ref 79–97)
MCV RBC AUTO: 93.7 FL (ref 79–97)
MCV RBC AUTO: 93.8 FL (ref 79–97)
MCV RBC AUTO: 94 FL (ref 79–97)
MCV RBC AUTO: 94.1 FL (ref 79–97)
MCV RBC AUTO: 94.1 FL (ref 79–97)
MCV RBC AUTO: 94.2 FL (ref 79–97)
MCV RBC AUTO: 94.5 FL (ref 79–97)
MCV RBC AUTO: 94.6 FL (ref 79–97)
MCV RBC AUTO: 94.7 FL (ref 79–97)
MCV RBC AUTO: 94.8 FL (ref 79–97)
MCV RBC AUTO: 94.8 FL (ref 79–97)
MCV RBC AUTO: 94.9 FL (ref 79–97)
MCV RBC AUTO: 95 FL (ref 79–97)
MCV RBC AUTO: 95.1 FL (ref 79–97)
MCV RBC AUTO: 95.1 FL (ref 79–97)
MCV RBC AUTO: 95.2 FL (ref 79–97)
MCV RBC AUTO: 95.2 FL (ref 79–97)
MCV RBC AUTO: 95.5 FL (ref 79–97)
MCV RBC AUTO: 95.5 FL (ref 79–97)
MCV RBC AUTO: 95.8 FL (ref 79–97)
MCV RBC AUTO: 96 FL (ref 79–97)
MCV RBC AUTO: 96.4 FL (ref 79–97)
MCV RBC AUTO: 96.6 FL (ref 79–97)
MCV RBC AUTO: 97.1 FL (ref 79–97)
MCV RBC AUTO: 97.2 FL (ref 79–97)
MCV RBC AUTO: 97.2 FL (ref 79–97)
MCV RBC AUTO: 97.3 FL (ref 79–97)
MCV RBC AUTO: 97.5 FL (ref 79–97)
MESOTHL CELL NFR FLD MANUAL: 5 %
METHOD: NORMAL
MODALITY: ABNORMAL
MODALITY: ABNORMAL
MODALITY: NORMAL
MONOCYTES # BLD AUTO: 0.3 10*3/MM3 (ref 0.1–0.9)
MONOCYTES # BLD AUTO: 0.4 10*3/MM3 (ref 0.1–0.9)
MONOCYTES # BLD AUTO: 0.5 10*3/MM3 (ref 0.1–0.9)
MONOCYTES # BLD AUTO: 0.6 10*3/MM3 (ref 0.1–0.9)
MONOCYTES # BLD AUTO: 0.7 10*3/MM3 (ref 0.1–0.9)
MONOCYTES # BLD AUTO: 0.8 10*3/MM3 (ref 0.1–0.9)
MONOCYTES # BLD AUTO: 1 10*3/MM3 (ref 0.1–0.9)
MONOCYTES # BLD: 0.35 10*3/MM3 (ref 0.1–0.9)
MONOCYTES # BLD: 0.47 10*3/MM3 (ref 0.1–0.9)
MONOCYTES # BLD: 0.49 10*3/MM3 (ref 0.1–0.9)
MONOCYTES # BLD: 0.58 10*3/MM3 (ref 0.1–0.9)
MONOCYTES # BLD: 0.58 10*3/MM3 (ref 0.1–0.9)
MONOCYTES NFR BLD AUTO: 10 % (ref 5–12)
MONOCYTES NFR BLD AUTO: 10.1 % (ref 5–12)
MONOCYTES NFR BLD AUTO: 10.4 % (ref 5–12)
MONOCYTES NFR BLD AUTO: 10.4 % (ref 5–12)
MONOCYTES NFR BLD AUTO: 10.6 % (ref 5–12)
MONOCYTES NFR BLD AUTO: 10.8 % (ref 5–12)
MONOCYTES NFR BLD AUTO: 10.8 % (ref 5–12)
MONOCYTES NFR BLD AUTO: 10.9 % (ref 5–12)
MONOCYTES NFR BLD AUTO: 10.9 % (ref 5–12)
MONOCYTES NFR BLD AUTO: 11 % (ref 5–12)
MONOCYTES NFR BLD AUTO: 11.5 % (ref 5–12)
MONOCYTES NFR BLD AUTO: 11.6 % (ref 5–12)
MONOCYTES NFR BLD AUTO: 13.2 % (ref 5–12)
MONOCYTES NFR BLD AUTO: 13.8 % (ref 5–12)
MONOCYTES NFR BLD AUTO: 14.1 % (ref 5–12)
MONOCYTES NFR BLD AUTO: 14.7 % (ref 5–12)
MONOCYTES NFR BLD AUTO: 15.1 % (ref 5–12)
MONOCYTES NFR BLD AUTO: 3.7 % (ref 5–12)
MONOCYTES NFR BLD AUTO: 4.7 % (ref 5–12)
MONOCYTES NFR BLD AUTO: 5.9 % (ref 5–12)
MONOCYTES NFR BLD AUTO: 7.2 % (ref 5–12)
MONOCYTES NFR BLD AUTO: 7.8 % (ref 5–12)
MONOCYTES NFR BLD AUTO: 8 % (ref 5–12)
MONOCYTES NFR BLD AUTO: 8.1 % (ref 5–12)
MONOCYTES NFR BLD AUTO: 8.5 % (ref 5–12)
MONOCYTES NFR BLD AUTO: 8.6 % (ref 5–12)
MONOCYTES NFR BLD AUTO: 8.6 % (ref 5–12)
MONOCYTES NFR BLD AUTO: 8.9 % (ref 5–12)
MONOCYTES NFR BLD AUTO: 9.1 % (ref 5–12)
MONOCYTES NFR BLD AUTO: 9.4 % (ref 5–12)
MONOCYTES NFR BLD AUTO: 9.5 % (ref 5–12)
MONOCYTES NFR BLD AUTO: 9.6 % (ref 5–12)
MONOCYTES NFR BLD AUTO: 9.6 % (ref 5–12)
MONOCYTES NFR BLD AUTO: 9.7 % (ref 5–12)
MONOCYTES NFR BLD AUTO: 9.8 % (ref 5–12)
MONOCYTES NFR FLD: 19 %
MRSA DNA SPEC QL NAA+PROBE: NORMAL
MYCOBACTERIUM SPEC CULT: NORMAL
MYCOBACTERIUM SPEC CULT: NORMAL
NEUTROPHILS # BLD AUTO: 3.23 10*3/MM3 (ref 1.7–7)
NEUTROPHILS # BLD AUTO: 3.42 10*3/MM3 (ref 1.7–7)
NEUTROPHILS # BLD AUTO: 3.65 10*3/MM3 (ref 1.7–7)
NEUTROPHILS # BLD AUTO: 3.75 10*3/MM3 (ref 1.7–7)
NEUTROPHILS # BLD AUTO: 3.89 10*3/MM3 (ref 1.7–7)
NEUTROPHILS NFR BLD AUTO: 2.5 10*3/MM3 (ref 1.7–7)
NEUTROPHILS NFR BLD AUTO: 2.5 10*3/MM3 (ref 1.7–7)
NEUTROPHILS NFR BLD AUTO: 2.9 10*3/MM3 (ref 1.7–7)
NEUTROPHILS NFR BLD AUTO: 3 10*3/MM3 (ref 1.7–7)
NEUTROPHILS NFR BLD AUTO: 3 10*3/MM3 (ref 1.7–7)
NEUTROPHILS NFR BLD AUTO: 3.2 10*3/MM3 (ref 1.7–7)
NEUTROPHILS NFR BLD AUTO: 3.4 10*3/MM3 (ref 1.7–7)
NEUTROPHILS NFR BLD AUTO: 3.6 10*3/MM3 (ref 1.7–7)
NEUTROPHILS NFR BLD AUTO: 3.6 10*3/MM3 (ref 1.7–7)
NEUTROPHILS NFR BLD AUTO: 3.7 10*3/MM3 (ref 1.7–7)
NEUTROPHILS NFR BLD AUTO: 3.8 10*3/MM3 (ref 1.7–7)
NEUTROPHILS NFR BLD AUTO: 3.9 10*3/MM3 (ref 1.7–7)
NEUTROPHILS NFR BLD AUTO: 4.3 10*3/MM3 (ref 1.7–7)
NEUTROPHILS NFR BLD AUTO: 4.5 10*3/MM3 (ref 1.7–7)
NEUTROPHILS NFR BLD AUTO: 4.5 10*3/MM3 (ref 1.7–7)
NEUTROPHILS NFR BLD AUTO: 4.6 10*3/MM3 (ref 1.7–7)
NEUTROPHILS NFR BLD AUTO: 4.7 10*3/MM3 (ref 1.7–7)
NEUTROPHILS NFR BLD AUTO: 4.7 10*3/MM3 (ref 1.7–7)
NEUTROPHILS NFR BLD AUTO: 4.8 10*3/MM3 (ref 1.7–7)
NEUTROPHILS NFR BLD AUTO: 4.9 10*3/MM3 (ref 1.7–7)
NEUTROPHILS NFR BLD AUTO: 5 10*3/MM3 (ref 1.7–7)
NEUTROPHILS NFR BLD AUTO: 5.2 10*3/MM3 (ref 1.7–7)
NEUTROPHILS NFR BLD AUTO: 57.9 % (ref 42.7–76)
NEUTROPHILS NFR BLD AUTO: 58 % (ref 42.7–76)
NEUTROPHILS NFR BLD AUTO: 58.3 % (ref 42.7–76)
NEUTROPHILS NFR BLD AUTO: 58.4 % (ref 42.7–76)
NEUTROPHILS NFR BLD AUTO: 58.8 % (ref 42.7–76)
NEUTROPHILS NFR BLD AUTO: 58.9 % (ref 42.7–76)
NEUTROPHILS NFR BLD AUTO: 59.3 % (ref 42.7–76)
NEUTROPHILS NFR BLD AUTO: 6 10*3/MM3 (ref 1.7–7)
NEUTROPHILS NFR BLD AUTO: 6.2 10*3/MM3 (ref 1.7–7)
NEUTROPHILS NFR BLD AUTO: 60.6 % (ref 42.7–76)
NEUTROPHILS NFR BLD AUTO: 63 % (ref 42.7–76)
NEUTROPHILS NFR BLD AUTO: 63.7 % (ref 42.7–76)
NEUTROPHILS NFR BLD AUTO: 64.5 % (ref 42.7–76)
NEUTROPHILS NFR BLD AUTO: 65 % (ref 42.7–76)
NEUTROPHILS NFR BLD AUTO: 65.1 % (ref 42.7–76)
NEUTROPHILS NFR BLD AUTO: 65.5 % (ref 42.7–76)
NEUTROPHILS NFR BLD AUTO: 65.5 % (ref 42.7–76)
NEUTROPHILS NFR BLD AUTO: 65.9 % (ref 42.7–76)
NEUTROPHILS NFR BLD AUTO: 66.8 % (ref 42.7–76)
NEUTROPHILS NFR BLD AUTO: 67.2 % (ref 42.7–76)
NEUTROPHILS NFR BLD AUTO: 67.8 % (ref 42.7–76)
NEUTROPHILS NFR BLD AUTO: 67.9 % (ref 42.7–76)
NEUTROPHILS NFR BLD AUTO: 69.6 % (ref 42.7–76)
NEUTROPHILS NFR BLD AUTO: 69.7 % (ref 42.7–76)
NEUTROPHILS NFR BLD AUTO: 70 % (ref 42.7–76)
NEUTROPHILS NFR BLD AUTO: 70.1 % (ref 42.7–76)
NEUTROPHILS NFR BLD AUTO: 70.1 % (ref 42.7–76)
NEUTROPHILS NFR BLD AUTO: 70.3 % (ref 42.7–76)
NEUTROPHILS NFR BLD AUTO: 71 % (ref 42.7–76)
NEUTROPHILS NFR BLD AUTO: 72.3 % (ref 42.7–76)
NEUTROPHILS NFR BLD AUTO: 72.6 % (ref 42.7–76)
NEUTROPHILS NFR BLD AUTO: 72.8 % (ref 42.7–76)
NEUTROPHILS NFR BLD AUTO: 74 % (ref 42.7–76)
NEUTROPHILS NFR BLD AUTO: 77 % (ref 42.7–76)
NEUTROPHILS NFR BLD AUTO: 79 % (ref 42.7–76)
NEUTROPHILS NFR BLD AUTO: 8.9 10*3/MM3 (ref 1.7–7)
NEUTROPHILS NFR BLD AUTO: 80 % (ref 42.7–76)
NEUTROPHILS NFR BLD AUTO: 82.1 % (ref 42.7–76)
NEUTROPHILS NFR BLD AUTO: 85.5 % (ref 42.7–76)
NEUTROPHILS NFR BLD AUTO: 88.4 % (ref 42.7–76)
NEUTROPHILS NFR BLD AUTO: 9 10*3/MM3 (ref 1.7–7)
NEUTROPHILS NFR BLD AUTO: 9.6 10*3/MM3 (ref 1.7–7)
NEUTROPHILS NFR BLD MANUAL: 54 % (ref 42.7–76)
NEUTROPHILS NFR BLD MANUAL: 58 % (ref 42.7–76)
NEUTROPHILS NFR BLD MANUAL: 59 % (ref 42.7–76)
NEUTROPHILS NFR BLD MANUAL: 61 % (ref 42.7–76)
NEUTROPHILS NFR BLD MANUAL: 73 % (ref 42.7–76)
NEUTROPHILS NFR FLD MANUAL: 19 %
NEUTS BAND NFR BLD MANUAL: 2 % (ref 0–5)
NEUTS BAND NFR BLD MANUAL: 4 % (ref 0–5)
NEUTS BAND NFR BLD MANUAL: 5 % (ref 0–5)
NEUTS BAND NFR BLD MANUAL: 5 % (ref 0–5)
NIGHT BLUE STAIN TISS: NORMAL
NIGHT BLUE STAIN TISS: NORMAL
NITRITE UR QL STRIP: POSITIVE
NITRITE UR QL STRIP: POSITIVE
NRBC BLD AUTO-RTO: 0 /100 WBC (ref 0–0.2)
NRBC BLD AUTO-RTO: 0.1 /100 WBC (ref 0–0.2)
NRBC BLD AUTO-RTO: 0.2 /100 WBC (ref 0–0.2)
NT-PROBNP SERPL-MCNC: ABNORMAL PG/ML (ref 0–900)
NUC CELL # FLD: 37 /MM3
OVALOCYTES BLD QL SMEAR: NORMAL
PATH REPORT.FINAL DX SPEC: NORMAL
PATH REPORT.GROSS SPEC: NORMAL
PCO2 BLDA: 47 MM HG (ref 35–48)
PCO2 BLDA: 53.9 MM HG (ref 35–48)
PCO2 BLDA: 58.2 MM HG (ref 35–48)
PCO2 BLDV: 80.8 MM HG (ref 41–51)
PH BLDA: 7.23 PH UNITS (ref 7.35–7.45)
PH BLDA: 7.27 PH UNITS (ref 7.35–7.45)
PH BLDA: 7.36 PH UNITS (ref 7.35–7.45)
PH BLDV: 7.24 PH UNITS (ref 7.31–7.41)
PH FLD: 7 [PH] (ref 6.5–7.5)
PH UR STRIP.AUTO: <=5 [PH] (ref 5–8)
PH UR STRIP.AUTO: <=5 [PH] (ref 5–8)
PHOSPHATE SERPL-MCNC: 2 MG/DL (ref 2.5–4.5)
PHOSPHATE SERPL-MCNC: 2.2 MG/DL (ref 2.5–4.5)
PHOSPHATE SERPL-MCNC: 2.2 MG/DL (ref 2.5–4.5)
PHOSPHATE SERPL-MCNC: 2.7 MG/DL (ref 2.5–4.5)
PHOSPHATE SERPL-MCNC: 2.9 MG/DL (ref 2.5–4.5)
PHOSPHATE SERPL-MCNC: 3.2 MG/DL (ref 2.5–4.5)
PHOSPHATE SERPL-MCNC: 3.2 MG/DL (ref 2.5–4.5)
PHOSPHATE SERPL-MCNC: 3.3 MG/DL (ref 2.5–4.5)
PHOSPHATE SERPL-MCNC: 3.4 MG/DL (ref 2.5–4.5)
PHOSPHATE SERPL-MCNC: 3.8 MG/DL (ref 2.5–4.5)
PHOSPHATE SERPL-MCNC: 3.9 MG/DL (ref 2.5–4.5)
PHOSPHATE SERPL-MCNC: 4 MG/DL (ref 2.5–4.5)
PLAT MORPH BLD: NORMAL
PLATELET # BLD AUTO: 108 10*3/MM3 (ref 140–450)
PLATELET # BLD AUTO: 114 10*3/MM3 (ref 140–450)
PLATELET # BLD AUTO: 126 10*3/MM3 (ref 140–450)
PLATELET # BLD AUTO: 129 10*3/MM3 (ref 140–450)
PLATELET # BLD AUTO: 147 10*3/MM3 (ref 140–450)
PLATELET # BLD AUTO: 147 10*3/MM3 (ref 140–450)
PLATELET # BLD AUTO: 158 10*3/MM3 (ref 140–450)
PLATELET # BLD AUTO: 163 10*3/MM3 (ref 140–450)
PLATELET # BLD AUTO: 166 10*3/MM3 (ref 140–450)
PLATELET # BLD AUTO: 171 10*3/MM3 (ref 140–450)
PLATELET # BLD AUTO: 173 10*3/MM3 (ref 140–450)
PLATELET # BLD AUTO: 177 10*3/MM3 (ref 140–450)
PLATELET # BLD AUTO: 182 10*3/MM3 (ref 140–450)
PLATELET # BLD AUTO: 183 10*3/MM3 (ref 140–450)
PLATELET # BLD AUTO: 184 10*3/MM3 (ref 140–450)
PLATELET # BLD AUTO: 185 10*3/MM3 (ref 140–450)
PLATELET # BLD AUTO: 188 10*3/MM3 (ref 140–450)
PLATELET # BLD AUTO: 189 10*3/MM3 (ref 140–450)
PLATELET # BLD AUTO: 190 10*3/MM3 (ref 140–450)
PLATELET # BLD AUTO: 190 10*3/MM3 (ref 140–450)
PLATELET # BLD AUTO: 195 10*3/MM3 (ref 140–450)
PLATELET # BLD AUTO: 200 10*3/MM3 (ref 140–450)
PLATELET # BLD AUTO: 200 10*3/MM3 (ref 140–450)
PLATELET # BLD AUTO: 203 10*3/MM3 (ref 140–450)
PLATELET # BLD AUTO: 203 10*3/MM3 (ref 140–450)
PLATELET # BLD AUTO: 205 10*3/MM3 (ref 140–450)
PLATELET # BLD AUTO: 208 10*3/MM3 (ref 140–450)
PLATELET # BLD AUTO: 211 10*3/MM3 (ref 140–450)
PLATELET # BLD AUTO: 211 10*3/MM3 (ref 140–450)
PLATELET # BLD AUTO: 212 10*3/MM3 (ref 140–450)
PLATELET # BLD AUTO: 213 10*3/MM3 (ref 140–450)
PLATELET # BLD AUTO: 213 10*3/MM3 (ref 140–450)
PLATELET # BLD AUTO: 219 10*3/MM3 (ref 140–450)
PLATELET # BLD AUTO: 226 10*3/MM3 (ref 140–450)
PLATELET # BLD AUTO: 227 10*3/MM3 (ref 140–450)
PLATELET # BLD AUTO: 234 10*3/MM3 (ref 140–450)
PLATELET # BLD AUTO: 236 10*3/MM3 (ref 140–450)
PLATELET # BLD AUTO: 252 10*3/MM3 (ref 140–450)
PLATELET # BLD AUTO: 253 10*3/MM3 (ref 140–450)
PLATELET # BLD AUTO: 262 10*3/MM3 (ref 140–450)
PLATELET # BLD AUTO: 262 10*3/MM3 (ref 140–450)
PLATELET # BLD AUTO: 282 10*3/MM3 (ref 140–450)
PLATELET # BLD AUTO: 309 10*3/MM3 (ref 140–450)
PLATELET # BLD AUTO: 330 10*3/MM3 (ref 140–450)
PMV BLD AUTO: 6.9 FL (ref 6–12)
PMV BLD AUTO: 7.2 FL (ref 6–12)
PMV BLD AUTO: 7.3 FL (ref 6–12)
PMV BLD AUTO: 7.4 FL (ref 6–12)
PMV BLD AUTO: 7.4 FL (ref 6–12)
PMV BLD AUTO: 7.5 FL (ref 6–12)
PMV BLD AUTO: 7.5 FL (ref 6–12)
PMV BLD AUTO: 7.6 FL (ref 6–12)
PMV BLD AUTO: 7.7 FL (ref 6–12)
PMV BLD AUTO: 7.7 FL (ref 6–12)
PMV BLD AUTO: 7.8 FL (ref 6–12)
PMV BLD AUTO: 7.9 FL (ref 6–12)
PMV BLD AUTO: 8 FL (ref 6–12)
PMV BLD AUTO: 8.1 FL (ref 6–12)
PMV BLD AUTO: 8.1 FL (ref 6–12)
PMV BLD AUTO: 8.2 FL (ref 6–12)
PMV BLD AUTO: 8.3 FL (ref 6–12)
PMV BLD AUTO: 8.4 FL (ref 6–12)
PMV BLD AUTO: 8.5 FL (ref 6–12)
PMV BLD AUTO: 8.6 FL (ref 6–12)
PMV BLD AUTO: 8.8 FL (ref 6–12)
PMV BLD AUTO: 8.8 FL (ref 6–12)
PMV BLD AUTO: 8.9 FL (ref 6–12)
PMV BLD AUTO: 9.2 FL (ref 6–12)
PO2 BLDA: 100.1 MM HG (ref 83–108)
PO2 BLDA: 71.3 MM HG (ref 83–108)
PO2 BLDA: 77.7 MM HG (ref 83–108)
PO2 BLDV: 30 MM HG (ref 35–42)
POIKILOCYTOSIS BLD QL SMEAR: NORMAL
POTASSIUM BLDA-SCNC: 3 MMOL/L (ref 3.5–4.5)
POTASSIUM BLDA-SCNC: 4.3 MMOL/L (ref 3.5–4.9)
POTASSIUM SERPL-SCNC: 3.2 MMOL/L (ref 3.5–5.2)
POTASSIUM SERPL-SCNC: 3.8 MMOL/L (ref 3.5–5.2)
POTASSIUM SERPL-SCNC: 3.9 MMOL/L (ref 3.5–5.2)
POTASSIUM SERPL-SCNC: 4 MMOL/L (ref 3.5–5.2)
POTASSIUM SERPL-SCNC: 4.1 MMOL/L (ref 3.5–5.2)
POTASSIUM SERPL-SCNC: 4.2 MMOL/L (ref 3.5–5.2)
POTASSIUM SERPL-SCNC: 4.3 MMOL/L (ref 3.5–5.2)
POTASSIUM SERPL-SCNC: 4.4 MMOL/L (ref 3.5–5.2)
POTASSIUM SERPL-SCNC: 4.5 MMOL/L (ref 3.5–5.2)
POTASSIUM SERPL-SCNC: 4.5 MMOL/L (ref 3.5–5.2)
POTASSIUM SERPL-SCNC: 4.6 MMOL/L (ref 3.5–5.2)
POTASSIUM SERPL-SCNC: 4.7 MMOL/L (ref 3.5–5.2)
POTASSIUM SERPL-SCNC: 4.8 MMOL/L (ref 3.5–5.2)
POTASSIUM SERPL-SCNC: 5.1 MMOL/L (ref 3.5–5.2)
POTASSIUM SERPL-SCNC: 5.2 MMOL/L (ref 3.5–5.2)
POTASSIUM SERPL-SCNC: 5.4 MMOL/L (ref 3.5–5.2)
PROCALCITONIN SERPL-MCNC: 0.27 NG/ML (ref 0–0.25)
PROT SERPL-MCNC: 5.3 G/DL (ref 6–8.5)
PROT SERPL-MCNC: 5.4 G/DL (ref 6–8.5)
PROT SERPL-MCNC: 5.5 G/DL (ref 6–8.5)
PROT SERPL-MCNC: 5.6 G/DL (ref 6–8.5)
PROT SERPL-MCNC: 5.7 G/DL (ref 6–8.5)
PROT SERPL-MCNC: 5.8 G/DL (ref 6–8.5)
PROT SERPL-MCNC: 6 G/DL (ref 6–8.5)
PROT SERPL-MCNC: 6.1 G/DL (ref 6–8.5)
PROT SERPL-MCNC: 6.2 G/DL (ref 6–8.5)
PROT SERPL-MCNC: 6.5 G/DL (ref 6–8.5)
PROT UR QL STRIP: ABNORMAL
PROT UR QL STRIP: ABNORMAL
PROTHROMBIN TIME: 11.2 SECONDS (ref 9.6–11.7)
PROTHROMBIN TIME: 11.4 SECONDS (ref 9.6–11.7)
PROTHROMBIN TIME: 11.5 SECONDS (ref 9.6–11.7)
PROTHROMBIN TIME: 11.8 SECONDS (ref 9.6–11.7)
QT INTERVAL: 348 MS
QT INTERVAL: 348 MS
QT INTERVAL: 355 MS
QT INTERVAL: 364 MS
QT INTERVAL: 391 MS
QT INTERVAL: 407 MS
QT INTERVAL: 408 MS
QT INTERVAL: 434 MS
QT INTERVAL: 455 MS
QT INTERVAL: 499 MS
QT INTERVAL: 509 MS
QT INTERVAL: 541 MS
RBC # BLD AUTO: 2.4 10*6/MM3 (ref 4.14–5.8)
RBC # BLD AUTO: 2.46 10*6/MM3 (ref 4.14–5.8)
RBC # BLD AUTO: 2.48 10*6/MM3 (ref 4.14–5.8)
RBC # BLD AUTO: 2.5 10*6/MM3 (ref 4.14–5.8)
RBC # BLD AUTO: 2.7 10*6/MM3 (ref 4.14–5.8)
RBC # BLD AUTO: 2.86 10*6/MM3 (ref 4.14–5.8)
RBC # BLD AUTO: 3.38 10*6/MM3 (ref 4.14–5.8)
RBC # BLD AUTO: 3.39 10*6/MM3 (ref 4.14–5.8)
RBC # BLD AUTO: 3.43 10*6/MM3 (ref 4.14–5.8)
RBC # BLD AUTO: 3.44 10*6/MM3 (ref 4.14–5.8)
RBC # BLD AUTO: 3.45 10*6/MM3 (ref 4.14–5.8)
RBC # BLD AUTO: 3.45 10*6/MM3 (ref 4.14–5.8)
RBC # BLD AUTO: 3.47 10*6/MM3 (ref 4.14–5.8)
RBC # BLD AUTO: 3.47 10*6/MM3 (ref 4.14–5.8)
RBC # BLD AUTO: 3.49 10*6/MM3 (ref 4.14–5.8)
RBC # BLD AUTO: 3.49 10*6/MM3 (ref 4.14–5.8)
RBC # BLD AUTO: 3.5 10*6/MM3 (ref 4.14–5.8)
RBC # BLD AUTO: 3.51 10*6/MM3 (ref 4.14–5.8)
RBC # BLD AUTO: 3.53 10*6/MM3 (ref 4.14–5.8)
RBC # BLD AUTO: 3.53 10*6/MM3 (ref 4.14–5.8)
RBC # BLD AUTO: 3.54 10*6/MM3 (ref 4.14–5.8)
RBC # BLD AUTO: 3.55 10*6/MM3 (ref 4.14–5.8)
RBC # BLD AUTO: 3.55 10*6/MM3 (ref 4.14–5.8)
RBC # BLD AUTO: 3.57 10*6/MM3 (ref 4.14–5.8)
RBC # BLD AUTO: 3.58 10*6/MM3 (ref 4.14–5.8)
RBC # BLD AUTO: 3.61 10*6/MM3 (ref 4.14–5.8)
RBC # BLD AUTO: 3.62 10*6/MM3 (ref 4.14–5.8)
RBC # BLD AUTO: 3.64 10*6/MM3 (ref 4.14–5.8)
RBC # BLD AUTO: 3.65 10*6/MM3 (ref 4.14–5.8)
RBC # BLD AUTO: 3.67 10*6/MM3 (ref 4.14–5.8)
RBC # BLD AUTO: 3.67 10*6/MM3 (ref 4.14–5.8)
RBC # BLD AUTO: 3.68 10*6/MM3 (ref 4.14–5.8)
RBC # BLD AUTO: 3.69 10*6/MM3 (ref 4.14–5.8)
RBC # BLD AUTO: 3.7 10*6/MM3 (ref 4.14–5.8)
RBC # BLD AUTO: 3.7 10*6/MM3 (ref 4.14–5.8)
RBC # BLD AUTO: 3.71 10*6/MM3 (ref 4.14–5.8)
RBC # BLD AUTO: 3.72 10*6/MM3 (ref 4.14–5.8)
RBC # BLD AUTO: 3.72 10*6/MM3 (ref 4.14–5.8)
RBC # BLD AUTO: 3.78 10*6/MM3 (ref 4.14–5.8)
RBC # BLD AUTO: 3.78 10*6/MM3 (ref 4.14–5.8)
RBC # BLD AUTO: 3.79 10*6/MM3 (ref 4.14–5.8)
RBC # BLD AUTO: 3.87 10*6/MM3 (ref 4.14–5.8)
RBC # BLD AUTO: 3.9 10*6/MM3 (ref 4.14–5.8)
RBC # BLD AUTO: 4.14 10*6/MM3 (ref 4.14–5.8)
RBC # UR STRIP: ABNORMAL /HPF
RBC # UR STRIP: ABNORMAL /HPF
RBC MORPH BLD: NORMAL
RBC MORPH BLD: NORMAL
REF LAB TEST METHOD: ABNORMAL
REF LAB TEST METHOD: ABNORMAL
RH BLD: POSITIVE
RHINOVIRUS RNA SPEC NAA+PROBE: NOT DETECTED
RSV RNA NPH QL NAA+NON-PROBE: NOT DETECTED
SAO2 % BLDCOA: 91.3 % (ref 94–98)
SAO2 % BLDCOA: 92.3 % (ref 94–98)
SAO2 % BLDCOA: 97.4 % (ref 94–98)
SAO2 % BLDCOV: 37 % (ref 95–99)
SARS-COV-2 ORF1AB RESP QL NAA+PROBE: NOT DETECTED
SARS-COV-2 ORF1AB RESP QL NAA+PROBE: NOT DETECTED
SARS-COV-2 RNA NPH QL NAA+NON-PROBE: NOT DETECTED
SARS-COV-2 RNA PNL SPEC NAA+PROBE: NOT DETECTED
SARS-COV-2 RNA RESP QL NAA+PROBE: NOT DETECTED
SCAN SLIDE: NORMAL
SODIUM BLD-SCNC: 134 MMOL/L (ref 138–146)
SODIUM BLD-SCNC: 137 MMOL/L (ref 138–146)
SODIUM SERPL-SCNC: 126 MMOL/L (ref 136–145)
SODIUM SERPL-SCNC: 127 MMOL/L (ref 136–145)
SODIUM SERPL-SCNC: 128 MMOL/L (ref 136–145)
SODIUM SERPL-SCNC: 129 MMOL/L (ref 136–145)
SODIUM SERPL-SCNC: 130 MMOL/L (ref 136–145)
SODIUM SERPL-SCNC: 130 MMOL/L (ref 136–145)
SODIUM SERPL-SCNC: 131 MMOL/L (ref 136–145)
SODIUM SERPL-SCNC: 132 MMOL/L (ref 136–145)
SODIUM SERPL-SCNC: 133 MMOL/L (ref 136–145)
SODIUM SERPL-SCNC: 134 MMOL/L (ref 136–145)
SODIUM SERPL-SCNC: 135 MMOL/L (ref 136–145)
SODIUM SERPL-SCNC: 136 MMOL/L (ref 136–145)
SODIUM SERPL-SCNC: 138 MMOL/L (ref 136–145)
SP GR UR STRIP: 1.02 (ref 1–1.03)
SP GR UR STRIP: 1.02 (ref 1–1.03)
SQUAMOUS #/AREA URNS HPF: ABNORMAL /HPF
SQUAMOUS #/AREA URNS HPF: ABNORMAL /HPF
STRESS TARGET HR: 127 BPM
T&S EXPIRATION DATE: NORMAL
T4 FREE SERPL-MCNC: 0.81 NG/DL (ref 0.93–1.7)
TRIGL SERPL-MCNC: 87 MG/DL (ref 0–150)
TRIGL SERPL-MCNC: 97 MG/DL (ref 0–150)
TRIGL SERPL-MCNC: 98 MG/DL (ref 0–150)
TROPONIN T SERPL-MCNC: 0.15 NG/ML (ref 0–0.03)
TROPONIN T SERPL-MCNC: 0.16 NG/ML (ref 0–0.03)
TROPONIN T SERPL-MCNC: 0.31 NG/ML (ref 0–0.03)
TROPONIN T SERPL-MCNC: 0.33 NG/ML (ref 0–0.03)
TROPONIN T SERPL-MCNC: 0.58 NG/ML (ref 0–0.03)
TSH SERPL DL<=0.05 MIU/L-ACNC: 8.74 UIU/ML (ref 0.27–4.2)
UROBILINOGEN UR QL STRIP: ABNORMAL
UROBILINOGEN UR QL STRIP: ABNORMAL
VARIANT LYMPHS NFR BLD MANUAL: 17 % (ref 19.6–45.3)
VARIANT LYMPHS NFR BLD MANUAL: 21 % (ref 19.6–45.3)
VARIANT LYMPHS NFR BLD MANUAL: 22 % (ref 19.6–45.3)
VARIANT LYMPHS NFR BLD MANUAL: 27 % (ref 19.6–45.3)
VARIANT LYMPHS NFR BLD MANUAL: 33 % (ref 19.6–45.3)
VIT B12 BLD-MCNC: >2000 PG/ML (ref 211–946)
VLDLC SERPL-MCNC: 17 MG/DL (ref 5–40)
VLDLC SERPL-MCNC: 18 MG/DL (ref 5–40)
VLDLC SERPL-MCNC: 18 MG/DL (ref 5–40)
WBC # UR STRIP: ABNORMAL /HPF
WBC # UR STRIP: ABNORMAL /HPF
WBC MORPH BLD: NORMAL
WBC NRBC COR # BLD: 11 10*3/MM3 (ref 3.4–10.8)
WBC NRBC COR # BLD: 11.2 10*3/MM3 (ref 3.4–10.8)
WBC NRBC COR # BLD: 11.2 10*3/MM3 (ref 3.4–10.8)
WBC NRBC COR # BLD: 3.9 10*3/MM3 (ref 3.4–10.8)
WBC NRBC COR # BLD: 4.3 10*3/MM3 (ref 3.4–10.8)
WBC NRBC COR # BLD: 4.4 10*3/MM3 (ref 3.4–10.8)
WBC NRBC COR # BLD: 4.6 10*3/MM3 (ref 3.4–10.8)
WBC NRBC COR # BLD: 4.8 10*3/MM3 (ref 3.4–10.8)
WBC NRBC COR # BLD: 4.9 10*3/MM3 (ref 3.4–10.8)
WBC NRBC COR # BLD: 4.9 10*3/MM3 (ref 3.4–10.8)
WBC NRBC COR # BLD: 5 10*3/MM3 (ref 3.4–10.8)
WBC NRBC COR # BLD: 5.1 10*3/MM3 (ref 3.4–10.8)
WBC NRBC COR # BLD: 5.2 10*3/MM3 (ref 3.4–10.8)
WBC NRBC COR # BLD: 5.3 10*3/MM3 (ref 3.4–10.8)
WBC NRBC COR # BLD: 5.4 10*3/MM3 (ref 3.4–10.8)
WBC NRBC COR # BLD: 5.5 10*3/MM3 (ref 3.4–10.8)
WBC NRBC COR # BLD: 5.7 10*3/MM3 (ref 3.4–10.8)
WBC NRBC COR # BLD: 5.8 10*3/MM3 (ref 3.4–10.8)
WBC NRBC COR # BLD: 6.1 10*3/MM3 (ref 3.4–10.8)
WBC NRBC COR # BLD: 6.3 10*3/MM3 (ref 3.4–10.8)
WBC NRBC COR # BLD: 6.3 10*3/MM3 (ref 3.4–10.8)
WBC NRBC COR # BLD: 6.4 10*3/MM3 (ref 3.4–10.8)
WBC NRBC COR # BLD: 6.5 10*3/MM3 (ref 3.4–10.8)
WBC NRBC COR # BLD: 6.6 10*3/MM3 (ref 3.4–10.8)
WBC NRBC COR # BLD: 6.7 10*3/MM3 (ref 3.4–10.8)
WBC NRBC COR # BLD: 6.9 10*3/MM3 (ref 3.4–10.8)
WBC NRBC COR # BLD: 7.1 10*3/MM3 (ref 3.4–10.8)
WBC NRBC COR # BLD: 7.2 10*3/MM3 (ref 3.4–10.8)
WBC NRBC COR # BLD: 7.2 10*3/MM3 (ref 3.4–10.8)
WBC NRBC COR # BLD: 7.7 10*3/MM3 (ref 3.4–10.8)
WBC NRBC COR # BLD: 7.9 10*3/MM3 (ref 3.4–10.8)
WHOLE BLOOD HOLD SPECIMEN: NORMAL

## 2022-01-01 PROCEDURE — 96376 TX/PRO/DX INJ SAME DRUG ADON: CPT

## 2022-01-01 PROCEDURE — 25010000002 MORPHINE PER 10 MG

## 2022-01-01 PROCEDURE — 0DBM8ZZ EXCISION OF DESCENDING COLON, VIA NATURAL OR ARTIFICIAL OPENING ENDOSCOPIC: ICD-10-PCS | Performed by: INTERNAL MEDICINE

## 2022-01-01 PROCEDURE — 82962 GLUCOSE BLOOD TEST: CPT

## 2022-01-01 PROCEDURE — 99232 SBSQ HOSP IP/OBS MODERATE 35: CPT | Performed by: INTERNAL MEDICINE

## 2022-01-01 PROCEDURE — 94799 UNLISTED PULMONARY SVC/PX: CPT

## 2022-01-01 PROCEDURE — 97110 THERAPEUTIC EXERCISES: CPT

## 2022-01-01 PROCEDURE — 93005 ELECTROCARDIOGRAM TRACING: CPT

## 2022-01-01 PROCEDURE — 80048 BASIC METABOLIC PNL TOTAL CA: CPT | Performed by: INTERNAL MEDICINE

## 2022-01-01 PROCEDURE — 94660 CPAP INITIATION&MGMT: CPT

## 2022-01-01 PROCEDURE — 63710000001 INSULIN ISOPHANE & REGULAR PER 5 UNITS: Performed by: INTERNAL MEDICINE

## 2022-01-01 PROCEDURE — 5A12012 PERFORMANCE OF CARDIAC OUTPUT, SINGLE, MANUAL: ICD-10-PCS | Performed by: INTERNAL MEDICINE

## 2022-01-01 PROCEDURE — 25010000002 LORAZEPAM PER 2 MG: Performed by: INTERNAL MEDICINE

## 2022-01-01 PROCEDURE — C1725 CATH, TRANSLUMIN NON-LASER: HCPCS | Performed by: INTERNAL MEDICINE

## 2022-01-01 PROCEDURE — 25010000002 CEFTRIAXONE PER 250 MG: Performed by: INTERNAL MEDICINE

## 2022-01-01 PROCEDURE — 85025 COMPLETE CBC W/AUTO DIFF WBC: CPT | Performed by: NURSE PRACTITIONER

## 2022-01-01 PROCEDURE — 94761 N-INVAS EAR/PLS OXIMETRY MLT: CPT

## 2022-01-01 PROCEDURE — 85025 COMPLETE CBC W/AUTO DIFF WBC: CPT | Performed by: STUDENT IN AN ORGANIZED HEALTH CARE EDUCATION/TRAINING PROGRAM

## 2022-01-01 PROCEDURE — 86704 HEP B CORE ANTIBODY TOTAL: CPT | Performed by: HOSPITALIST

## 2022-01-01 PROCEDURE — 5A1D70Z PERFORMANCE OF URINARY FILTRATION, INTERMITTENT, LESS THAN 6 HOURS PER DAY: ICD-10-PCS | Performed by: INTERNAL MEDICINE

## 2022-01-01 PROCEDURE — C1887 CATHETER, GUIDING: HCPCS | Performed by: INTERNAL MEDICINE

## 2022-01-01 PROCEDURE — 94640 AIRWAY INHALATION TREATMENT: CPT

## 2022-01-01 PROCEDURE — 85025 COMPLETE CBC W/AUTO DIFF WBC: CPT | Performed by: INTERNAL MEDICINE

## 2022-01-01 PROCEDURE — 94664 DEMO&/EVAL PT USE INHALER: CPT

## 2022-01-01 PROCEDURE — 87040 BLOOD CULTURE FOR BACTERIA: CPT

## 2022-01-01 PROCEDURE — 93005 ELECTROCARDIOGRAM TRACING: CPT | Performed by: EMERGENCY MEDICINE

## 2022-01-01 PROCEDURE — 63710000001 INSULIN ISOPHANE & REGULAR PER 5 UNITS: Performed by: HOSPITALIST

## 2022-01-01 PROCEDURE — 82947 ASSAY GLUCOSE BLOOD QUANT: CPT

## 2022-01-01 PROCEDURE — 92526 ORAL FUNCTION THERAPY: CPT

## 2022-01-01 PROCEDURE — 84100 ASSAY OF PHOSPHORUS: CPT | Performed by: NURSE PRACTITIONER

## 2022-01-01 PROCEDURE — 02HV33Z INSERTION OF INFUSION DEVICE INTO SUPERIOR VENA CAVA, PERCUTANEOUS APPROACH: ICD-10-PCS | Performed by: INTERNAL MEDICINE

## 2022-01-01 PROCEDURE — 25010000002 HEPARIN (PORCINE) PER 1000 UNITS: Performed by: INTERNAL MEDICINE

## 2022-01-01 PROCEDURE — 84100 ASSAY OF PHOSPHORUS: CPT | Performed by: INTERNAL MEDICINE

## 2022-01-01 PROCEDURE — 87340 HEPATITIS B SURFACE AG IA: CPT | Performed by: INTERNAL MEDICINE

## 2022-01-01 PROCEDURE — 87206 SMEAR FLUORESCENT/ACID STAI: CPT | Performed by: INTERNAL MEDICINE

## 2022-01-01 PROCEDURE — G0378 HOSPITAL OBSERVATION PER HR: HCPCS

## 2022-01-01 PROCEDURE — 0 CEFAZOLIN PER 500 MG: Performed by: SURGERY

## 2022-01-01 PROCEDURE — 99152 MOD SED SAME PHYS/QHP 5/>YRS: CPT | Performed by: INTERNAL MEDICINE

## 2022-01-01 PROCEDURE — 85007 BL SMEAR W/DIFF WBC COUNT: CPT | Performed by: INTERNAL MEDICINE

## 2022-01-01 PROCEDURE — 97530 THERAPEUTIC ACTIVITIES: CPT

## 2022-01-01 PROCEDURE — 99233 SBSQ HOSP IP/OBS HIGH 50: CPT | Performed by: INTERNAL MEDICINE

## 2022-01-01 PROCEDURE — 80048 BASIC METABOLIC PNL TOTAL CA: CPT | Performed by: NURSE PRACTITIONER

## 2022-01-01 PROCEDURE — 99284 EMERGENCY DEPT VISIT MOD MDM: CPT

## 2022-01-01 PROCEDURE — 63710000001 INSULIN LISPRO (HUMAN) PER 5 UNITS: Performed by: INTERNAL MEDICINE

## 2022-01-01 PROCEDURE — 99217 PR OBSERVATION CARE DISCHARGE MANAGEMENT: CPT | Performed by: INTERNAL MEDICINE

## 2022-01-01 PROCEDURE — 80061 LIPID PANEL: CPT | Performed by: NURSE PRACTITIONER

## 2022-01-01 PROCEDURE — 25010000002 ALBUMIN HUMAN 25% PER 50 ML: Performed by: INTERNAL MEDICINE

## 2022-01-01 PROCEDURE — 87116 MYCOBACTERIA CULTURE: CPT | Performed by: INTERNAL MEDICINE

## 2022-01-01 PROCEDURE — 99219 PR INITIAL OBSERVATION CARE/DAY 50 MINUTES: CPT | Performed by: NURSE PRACTITIONER

## 2022-01-01 PROCEDURE — 25010000002 ONDANSETRON PER 1 MG: Performed by: INTERNAL MEDICINE

## 2022-01-01 PROCEDURE — 82565 ASSAY OF CREATININE: CPT

## 2022-01-01 PROCEDURE — 99232 SBSQ HOSP IP/OBS MODERATE 35: CPT | Performed by: HOSPITALIST

## 2022-01-01 PROCEDURE — U0003 INFECTIOUS AGENT DETECTION BY NUCLEIC ACID (DNA OR RNA); SEVERE ACUTE RESPIRATORY SYNDROME CORONAVIRUS 2 (SARS-COV-2) (CORONAVIRUS DISEASE [COVID-19]), AMPLIFIED PROBE TECHNIQUE, MAKING USE OF HIGH THROUGHPUT TECHNOLOGIES AS DESCRIBED BY CMS-2020-01-R: HCPCS | Performed by: INTERNAL MEDICINE

## 2022-01-01 PROCEDURE — 70450 CT HEAD/BRAIN W/O DYE: CPT

## 2022-01-01 PROCEDURE — 93010 ELECTROCARDIOGRAM REPORT: CPT | Performed by: INTERNAL MEDICINE

## 2022-01-01 PROCEDURE — 25010000002 PHENYLEPHRINE 10 MG/ML SOLUTION: Performed by: INTERNAL MEDICINE

## 2022-01-01 PROCEDURE — C1894 INTRO/SHEATH, NON-LASER: HCPCS | Performed by: SURGERY

## 2022-01-01 PROCEDURE — 89051 BODY FLUID CELL COUNT: CPT | Performed by: NURSE PRACTITIONER

## 2022-01-01 PROCEDURE — C1894 INTRO/SHEATH, NON-LASER: HCPCS | Performed by: INTERNAL MEDICINE

## 2022-01-01 PROCEDURE — U0004 COV-19 TEST NON-CDC HGH THRU: HCPCS

## 2022-01-01 PROCEDURE — 80053 COMPREHEN METABOLIC PANEL: CPT | Performed by: INTERNAL MEDICINE

## 2022-01-01 PROCEDURE — 93306 TTE W/DOPPLER COMPLETE: CPT

## 2022-01-01 PROCEDURE — C1769 GUIDE WIRE: HCPCS | Performed by: SURGERY

## 2022-01-01 PROCEDURE — 71045 X-RAY EXAM CHEST 1 VIEW: CPT

## 2022-01-01 PROCEDURE — 82803 BLOOD GASES ANY COMBINATION: CPT

## 2022-01-01 PROCEDURE — 0202U NFCT DS 22 TRGT SARS-COV-2: CPT | Performed by: INTERNAL MEDICINE

## 2022-01-01 PROCEDURE — 83605 ASSAY OF LACTIC ACID: CPT

## 2022-01-01 PROCEDURE — 0B9K8ZX DRAINAGE OF RIGHT LUNG, VIA NATURAL OR ARTIFICIAL OPENING ENDOSCOPIC, DIAGNOSTIC: ICD-10-PCS | Performed by: INTERNAL MEDICINE

## 2022-01-01 PROCEDURE — 84443 ASSAY THYROID STIM HORMONE: CPT | Performed by: INTERNAL MEDICINE

## 2022-01-01 PROCEDURE — 80069 RENAL FUNCTION PANEL: CPT | Performed by: INTERNAL MEDICINE

## 2022-01-01 PROCEDURE — G0463 HOSPITAL OUTPT CLINIC VISIT: HCPCS

## 2022-01-01 PROCEDURE — 99225 PR SBSQ OBSERVATION CARE/DAY 25 MINUTES: CPT | Performed by: HOSPITALIST

## 2022-01-01 PROCEDURE — 0 LIDOCAINE 1 % SOLUTION

## 2022-01-01 PROCEDURE — 87340 HEPATITIS B SURFACE AG IA: CPT | Performed by: HOSPITALIST

## 2022-01-01 PROCEDURE — 93312 ECHO TRANSESOPHAGEAL: CPT | Performed by: INTERNAL MEDICINE

## 2022-01-01 PROCEDURE — 83690 ASSAY OF LIPASE: CPT | Performed by: PHYSICIAN ASSISTANT

## 2022-01-01 PROCEDURE — 25010000002 ONDANSETRON PER 1 MG: Performed by: NURSE PRACTITIONER

## 2022-01-01 PROCEDURE — 83615 LACTATE (LD) (LDH) ENZYME: CPT | Performed by: NURSE PRACTITIONER

## 2022-01-01 PROCEDURE — C1889 IMPLANT/INSERT DEVICE, NOC: HCPCS | Performed by: SURGERY

## 2022-01-01 PROCEDURE — C9600 PERC DRUG-EL COR STENT SING: HCPCS | Performed by: INTERNAL MEDICINE

## 2022-01-01 PROCEDURE — C1874 STENT, COATED/COV W/DEL SYS: HCPCS | Performed by: INTERNAL MEDICINE

## 2022-01-01 PROCEDURE — 70551 MRI BRAIN STEM W/O DYE: CPT

## 2022-01-01 PROCEDURE — 63710000001 ONDANSETRON PER 8 MG: Performed by: NURSE PRACTITIONER

## 2022-01-01 PROCEDURE — 85730 THROMBOPLASTIN TIME PARTIAL: CPT | Performed by: NURSE PRACTITIONER

## 2022-01-01 PROCEDURE — 25010000002 DIPHENHYDRAMINE PER 50 MG

## 2022-01-01 PROCEDURE — C1769 GUIDE WIRE: HCPCS | Performed by: INTERNAL MEDICINE

## 2022-01-01 PROCEDURE — 83735 ASSAY OF MAGNESIUM: CPT | Performed by: INTERNAL MEDICINE

## 2022-01-01 PROCEDURE — 97535 SELF CARE MNGMENT TRAINING: CPT

## 2022-01-01 PROCEDURE — 25010000002 FUROSEMIDE PER 20 MG: Performed by: NURSE PRACTITIONER

## 2022-01-01 PROCEDURE — 83735 ASSAY OF MAGNESIUM: CPT

## 2022-01-01 PROCEDURE — 96372 THER/PROPH/DIAG INJ SC/IM: CPT

## 2022-01-01 PROCEDURE — 84484 ASSAY OF TROPONIN QUANT: CPT | Performed by: INTERNAL MEDICINE

## 2022-01-01 PROCEDURE — C9803 HOPD COVID-19 SPEC COLLECT: HCPCS

## 2022-01-01 PROCEDURE — 97165 OT EVAL LOW COMPLEX 30 MIN: CPT

## 2022-01-01 PROCEDURE — 87635 SARS-COV-2 COVID-19 AMP PRB: CPT

## 2022-01-01 PROCEDURE — 36415 COLL VENOUS BLD VENIPUNCTURE: CPT | Performed by: PHYSICIAN ASSISTANT

## 2022-01-01 PROCEDURE — 84100 ASSAY OF PHOSPHORUS: CPT

## 2022-01-01 PROCEDURE — 83605 ASSAY OF LACTIC ACID: CPT | Performed by: STUDENT IN AN ORGANIZED HEALTH CARE EDUCATION/TRAINING PROGRAM

## 2022-01-01 PROCEDURE — 87040 BLOOD CULTURE FOR BACTERIA: CPT | Performed by: INTERNAL MEDICINE

## 2022-01-01 PROCEDURE — 82607 VITAMIN B-12: CPT | Performed by: NURSE PRACTITIONER

## 2022-01-01 PROCEDURE — C1887 CATHETER, GUIDING: HCPCS | Performed by: SURGERY

## 2022-01-01 PROCEDURE — 97162 PT EVAL MOD COMPLEX 30 MIN: CPT

## 2022-01-01 PROCEDURE — 25010000002 CEFTRIAXONE PER 250 MG: Performed by: NURSE PRACTITIONER

## 2022-01-01 PROCEDURE — 93005 ELECTROCARDIOGRAM TRACING: CPT | Performed by: INTERNAL MEDICINE

## 2022-01-01 PROCEDURE — 25010000002 HEPARIN (PORCINE) PER 1000 UNITS

## 2022-01-01 PROCEDURE — 85025 COMPLETE CBC W/AUTO DIFF WBC: CPT | Performed by: HOSPITALIST

## 2022-01-01 PROCEDURE — 83036 HEMOGLOBIN GLYCOSYLATED A1C: CPT | Performed by: NURSE PRACTITIONER

## 2022-01-01 PROCEDURE — 83735 ASSAY OF MAGNESIUM: CPT | Performed by: STUDENT IN AN ORGANIZED HEALTH CARE EDUCATION/TRAINING PROGRAM

## 2022-01-01 PROCEDURE — 87635 SARS-COV-2 COVID-19 AMP PRB: CPT | Performed by: EMERGENCY MEDICINE

## 2022-01-01 PROCEDURE — C1751 CATH, INF, PER/CENT/MIDLINE: HCPCS

## 2022-01-01 PROCEDURE — 36600 WITHDRAWAL OF ARTERIAL BLOOD: CPT

## 2022-01-01 PROCEDURE — 99233 SBSQ HOSP IP/OBS HIGH 50: CPT | Performed by: HOSPITALIST

## 2022-01-01 PROCEDURE — 84484 ASSAY OF TROPONIN QUANT: CPT | Performed by: NURSE PRACTITIONER

## 2022-01-01 PROCEDURE — 93306 TTE W/DOPPLER COMPLETE: CPT | Performed by: INTERNAL MEDICINE

## 2022-01-01 PROCEDURE — 25010000002 ALBUMIN HUMAN 5% PER 50 ML

## 2022-01-01 PROCEDURE — 84145 PROCALCITONIN (PCT): CPT

## 2022-01-01 PROCEDURE — C1725 CATH, TRANSLUMIN NON-LASER: HCPCS | Performed by: SURGERY

## 2022-01-01 PROCEDURE — 99222 1ST HOSP IP/OBS MODERATE 55: CPT | Performed by: NURSE PRACTITIONER

## 2022-01-01 PROCEDURE — 027034Z DILATION OF CORONARY ARTERY, ONE ARTERY WITH DRUG-ELUTING INTRALUMINAL DEVICE, PERCUTANEOUS APPROACH: ICD-10-PCS | Performed by: INTERNAL MEDICINE

## 2022-01-01 PROCEDURE — 93990 DOPPLER FLOW TESTING: CPT

## 2022-01-01 PROCEDURE — 25010000002 AMIODARONE IN DEXTROSE 5% 360-4.14 MG/200ML-% SOLUTION

## 2022-01-01 PROCEDURE — 86140 C-REACTIVE PROTEIN: CPT | Performed by: INTERNAL MEDICINE

## 2022-01-01 PROCEDURE — 85007 BL SMEAR W/DIFF WBC COUNT: CPT | Performed by: PHYSICIAN ASSISTANT

## 2022-01-01 PROCEDURE — 93005 ELECTROCARDIOGRAM TRACING: CPT | Performed by: HOSPITALIST

## 2022-01-01 PROCEDURE — 80048 BASIC METABOLIC PNL TOTAL CA: CPT | Performed by: HOSPITALIST

## 2022-01-01 PROCEDURE — 25010000002 MORPHINE PER 10 MG: Performed by: NURSE PRACTITIONER

## 2022-01-01 PROCEDURE — 80053 COMPREHEN METABOLIC PANEL: CPT | Performed by: NURSE PRACTITIONER

## 2022-01-01 PROCEDURE — 83986 ASSAY PH BODY FLUID NOS: CPT | Performed by: NURSE PRACTITIONER

## 2022-01-01 PROCEDURE — B2151ZZ FLUOROSCOPY OF LEFT HEART USING LOW OSMOLAR CONTRAST: ICD-10-PCS | Performed by: INTERNAL MEDICINE

## 2022-01-01 PROCEDURE — 83880 ASSAY OF NATRIURETIC PEPTIDE: CPT | Performed by: NURSE PRACTITIONER

## 2022-01-01 PROCEDURE — 80053 COMPREHEN METABOLIC PANEL: CPT | Performed by: EMERGENCY MEDICINE

## 2022-01-01 PROCEDURE — 81001 URINALYSIS AUTO W/SCOPE: CPT | Performed by: STUDENT IN AN ORGANIZED HEALTH CARE EDUCATION/TRAINING PROGRAM

## 2022-01-01 PROCEDURE — 85014 HEMATOCRIT: CPT

## 2022-01-01 PROCEDURE — 86900 BLOOD TYPING SEROLOGIC ABO: CPT | Performed by: EMERGENCY MEDICINE

## 2022-01-01 PROCEDURE — 25010000002 PROPOFOL 10 MG/ML EMULSION: Performed by: ANESTHESIOLOGY

## 2022-01-01 PROCEDURE — 85007 BL SMEAR W/DIFF WBC COUNT: CPT | Performed by: NURSE PRACTITIONER

## 2022-01-01 PROCEDURE — 80048 BASIC METABOLIC PNL TOTAL CA: CPT | Performed by: STUDENT IN AN ORGANIZED HEALTH CARE EDUCATION/TRAINING PROGRAM

## 2022-01-01 PROCEDURE — 86706 HEP B SURFACE ANTIBODY: CPT | Performed by: INTERNAL MEDICINE

## 2022-01-01 PROCEDURE — 85610 PROTHROMBIN TIME: CPT | Performed by: INTERNAL MEDICINE

## 2022-01-01 PROCEDURE — 99223 1ST HOSP IP/OBS HIGH 75: CPT | Performed by: INTERNAL MEDICINE

## 2022-01-01 PROCEDURE — 84295 ASSAY OF SERUM SODIUM: CPT

## 2022-01-01 PROCEDURE — 86850 RBC ANTIBODY SCREEN: CPT | Performed by: EMERGENCY MEDICINE

## 2022-01-01 PROCEDURE — 0DBK8ZZ EXCISION OF ASCENDING COLON, VIA NATURAL OR ARTIFICIAL OPENING ENDOSCOPIC: ICD-10-PCS | Performed by: INTERNAL MEDICINE

## 2022-01-01 PROCEDURE — 83880 ASSAY OF NATRIURETIC PEPTIDE: CPT | Performed by: INTERNAL MEDICINE

## 2022-01-01 PROCEDURE — 80061 LIPID PANEL: CPT | Performed by: INTERNAL MEDICINE

## 2022-01-01 PROCEDURE — 93005 ELECTROCARDIOGRAM TRACING: CPT | Performed by: NURSE PRACTITIONER

## 2022-01-01 PROCEDURE — 76942 ECHO GUIDE FOR BIOPSY: CPT | Performed by: SURGERY

## 2022-01-01 PROCEDURE — 85007 BL SMEAR W/DIFF WBC COUNT: CPT | Performed by: STUDENT IN AN ORGANIZED HEALTH CARE EDUCATION/TRAINING PROGRAM

## 2022-01-01 PROCEDURE — 25010000002 EPINEPHRINE 1 MG/10ML SOLUTION PREFILLED SYRINGE

## 2022-01-01 PROCEDURE — 93325 DOPPLER ECHO COLOR FLOW MAPG: CPT | Performed by: INTERNAL MEDICINE

## 2022-01-01 PROCEDURE — 25010000002 METHYLPREDNISOLONE PER 125 MG: Performed by: NURSE PRACTITIONER

## 2022-01-01 PROCEDURE — 93459 L HRT ART/GRFT ANGIO: CPT | Performed by: INTERNAL MEDICINE

## 2022-01-01 PROCEDURE — 25010000002 AMIODARONE IN DEXTROSE 5% 360-4.14 MG/200ML-% SOLUTION: Performed by: INTERNAL MEDICINE

## 2022-01-01 PROCEDURE — 92611 MOTION FLUOROSCOPY/SWALLOW: CPT

## 2022-01-01 PROCEDURE — 25010000002 HEPARIN (PORCINE) PER 1000 UNITS: Performed by: HOSPITALIST

## 2022-01-01 PROCEDURE — 99217 PR OBSERVATION CARE DISCHARGE MANAGEMENT: CPT | Performed by: HOSPITALIST

## 2022-01-01 PROCEDURE — 87086 URINE CULTURE/COLONY COUNT: CPT | Performed by: PHYSICIAN ASSISTANT

## 2022-01-01 PROCEDURE — 85025 COMPLETE CBC W/AUTO DIFF WBC: CPT | Performed by: EMERGENCY MEDICINE

## 2022-01-01 PROCEDURE — 25010000002 HEPARIN (PORCINE) PER 1000 UNITS: Performed by: SURGERY

## 2022-01-01 PROCEDURE — 99285 EMERGENCY DEPT VISIT HI MDM: CPT

## 2022-01-01 PROCEDURE — 85027 COMPLETE CBC AUTOMATED: CPT | Performed by: INTERNAL MEDICINE

## 2022-01-01 PROCEDURE — 82330 ASSAY OF CALCIUM: CPT

## 2022-01-01 PROCEDURE — 87077 CULTURE AEROBIC IDENTIFY: CPT | Performed by: EMERGENCY MEDICINE

## 2022-01-01 PROCEDURE — 87040 BLOOD CULTURE FOR BACTERIA: CPT | Performed by: NURSE PRACTITIONER

## 2022-01-01 PROCEDURE — 99212 OFFICE O/P EST SF 10 MIN: CPT | Performed by: NURSE PRACTITIONER

## 2022-01-01 PROCEDURE — 36620 INSERTION CATHETER ARTERY: CPT

## 2022-01-01 PROCEDURE — 87206 SMEAR FLUORESCENT/ACID STAI: CPT | Performed by: NURSE PRACTITIONER

## 2022-01-01 PROCEDURE — 80051 ELECTROLYTE PANEL: CPT

## 2022-01-01 PROCEDURE — 83880 ASSAY OF NATRIURETIC PEPTIDE: CPT | Performed by: EMERGENCY MEDICINE

## 2022-01-01 PROCEDURE — 87102 FUNGUS ISOLATION CULTURE: CPT | Performed by: INTERNAL MEDICINE

## 2022-01-01 PROCEDURE — 81001 URINALYSIS AUTO W/SCOPE: CPT | Performed by: PHYSICIAN ASSISTANT

## 2022-01-01 PROCEDURE — 99222 1ST HOSP IP/OBS MODERATE 55: CPT | Performed by: INTERNAL MEDICINE

## 2022-01-01 PROCEDURE — 87635 SARS-COV-2 COVID-19 AMP PRB: CPT | Performed by: HOSPITALIST

## 2022-01-01 PROCEDURE — 99214 OFFICE O/P EST MOD 30 MIN: CPT | Performed by: INTERNAL MEDICINE

## 2022-01-01 PROCEDURE — 83735 ASSAY OF MAGNESIUM: CPT | Performed by: NURSE PRACTITIONER

## 2022-01-01 PROCEDURE — 93880 EXTRACRANIAL BILAT STUDY: CPT

## 2022-01-01 PROCEDURE — 97112 NEUROMUSCULAR REEDUCATION: CPT

## 2022-01-01 PROCEDURE — 97166 OT EVAL MOD COMPLEX 45 MIN: CPT

## 2022-01-01 PROCEDURE — 0W9930Z DRAINAGE OF RIGHT PLEURAL CAVITY WITH DRAINAGE DEVICE, PERCUTANEOUS APPROACH: ICD-10-PCS | Performed by: INTERNAL MEDICINE

## 2022-01-01 PROCEDURE — 86706 HEP B SURFACE ANTIBODY: CPT | Performed by: HOSPITALIST

## 2022-01-01 PROCEDURE — 80053 COMPREHEN METABOLIC PANEL: CPT | Performed by: PHYSICIAN ASSISTANT

## 2022-01-01 PROCEDURE — 87070 CULTURE OTHR SPECIMN AEROBIC: CPT | Performed by: NURSE PRACTITIONER

## 2022-01-01 PROCEDURE — 0 IOPAMIDOL PER 1 ML: Performed by: INTERNAL MEDICINE

## 2022-01-01 PROCEDURE — 03HY32Z INSERTION OF MONITORING DEVICE INTO UPPER ARTERY, PERCUTANEOUS APPROACH: ICD-10-PCS | Performed by: INTERNAL MEDICINE

## 2022-01-01 PROCEDURE — 63710000001 INSULIN LISPRO (HUMAN) PER 5 UNITS: Performed by: NURSE PRACTITIONER

## 2022-01-01 PROCEDURE — 85025 COMPLETE CBC W/AUTO DIFF WBC: CPT

## 2022-01-01 PROCEDURE — G0257 UNSCHED DIALYSIS ESRD PT HOS: HCPCS

## 2022-01-01 PROCEDURE — 25010000002 ROPIVACAINE PER 1 MG: Performed by: ANESTHESIOLOGY

## 2022-01-01 PROCEDURE — 92950 HEART/LUNG RESUSCITATION CPR: CPT

## 2022-01-01 PROCEDURE — 85610 PROTHROMBIN TIME: CPT | Performed by: EMERGENCY MEDICINE

## 2022-01-01 PROCEDURE — 96374 THER/PROPH/DIAG INJ IV PUSH: CPT

## 2022-01-01 PROCEDURE — 92523 SPEECH SOUND LANG COMPREHEN: CPT

## 2022-01-01 PROCEDURE — 85027 COMPLETE CBC AUTOMATED: CPT | Performed by: SURGERY

## 2022-01-01 PROCEDURE — 87116 MYCOBACTERIA CULTURE: CPT | Performed by: NURSE PRACTITIONER

## 2022-01-01 PROCEDURE — B2111ZZ FLUOROSCOPY OF MULTIPLE CORONARY ARTERIES USING LOW OSMOLAR CONTRAST: ICD-10-PCS | Performed by: INTERNAL MEDICINE

## 2022-01-01 PROCEDURE — 25010000002 EPOETIN ALFA-EPBX 20000 UNIT/ML SOLUTION: Performed by: INTERNAL MEDICINE

## 2022-01-01 PROCEDURE — 87641 MR-STAPH DNA AMP PROBE: CPT

## 2022-01-01 PROCEDURE — 25010000002 METHYLPREDNISOLONE PER 125 MG: Performed by: EMERGENCY MEDICINE

## 2022-01-01 PROCEDURE — 85018 HEMOGLOBIN: CPT | Performed by: NURSE PRACTITIONER

## 2022-01-01 PROCEDURE — 99238 HOSP IP/OBS DSCHRG MGMT 30/<: CPT | Performed by: HOSPITALIST

## 2022-01-01 PROCEDURE — 85347 COAGULATION TIME ACTIVATED: CPT

## 2022-01-01 PROCEDURE — 99219 PR INITIAL OBSERVATION CARE/DAY 50 MINUTES: CPT | Performed by: HOSPITALIST

## 2022-01-01 PROCEDURE — 25010000002 FUROSEMIDE PER 20 MG: Performed by: HOSPITALIST

## 2022-01-01 PROCEDURE — 92610 EVALUATE SWALLOWING FUNCTION: CPT

## 2022-01-01 PROCEDURE — 93325 DOPPLER ECHO COLOR FLOW MAPG: CPT

## 2022-01-01 PROCEDURE — 85379 FIBRIN DEGRADATION QUANT: CPT | Performed by: NURSE PRACTITIONER

## 2022-01-01 PROCEDURE — 87077 CULTURE AEROBIC IDENTIFY: CPT | Performed by: STUDENT IN AN ORGANIZED HEALTH CARE EDUCATION/TRAINING PROGRAM

## 2022-01-01 PROCEDURE — 82945 GLUCOSE OTHER FLUID: CPT | Performed by: NURSE PRACTITIONER

## 2022-01-01 PROCEDURE — 93320 DOPPLER ECHO COMPLETE: CPT

## 2022-01-01 PROCEDURE — 85027 COMPLETE CBC AUTOMATED: CPT | Performed by: NURSE PRACTITIONER

## 2022-01-01 PROCEDURE — 80048 BASIC METABOLIC PNL TOTAL CA: CPT | Performed by: EMERGENCY MEDICINE

## 2022-01-01 PROCEDURE — 84132 ASSAY OF SERUM POTASSIUM: CPT

## 2022-01-01 PROCEDURE — 25010000002 CEFTRIAXONE PER 250 MG: Performed by: EMERGENCY MEDICINE

## 2022-01-01 PROCEDURE — 80053 COMPREHEN METABOLIC PANEL: CPT

## 2022-01-01 PROCEDURE — B2131ZZ FLUOROSCOPY OF MULTIPLE CORONARY ARTERY BYPASS GRAFTS USING LOW OSMOLAR CONTRAST: ICD-10-PCS | Performed by: INTERNAL MEDICINE

## 2022-01-01 PROCEDURE — 0202U NFCT DS 22 TRGT SARS-COV-2: CPT | Performed by: EMERGENCY MEDICINE

## 2022-01-01 PROCEDURE — 25010000002 PROTAMINE SULFATE PER 10 MG

## 2022-01-01 PROCEDURE — 80061 LIPID PANEL: CPT

## 2022-01-01 PROCEDURE — 85018 HEMOGLOBIN: CPT

## 2022-01-01 PROCEDURE — 85610 PROTHROMBIN TIME: CPT | Performed by: NURSE PRACTITIONER

## 2022-01-01 PROCEDURE — 25010000002 MAGNESIUM SULFATE IN D5W 1G/100ML (PREMIX) 1-5 GM/100ML-% SOLUTION: Performed by: NURSE PRACTITIONER

## 2022-01-01 PROCEDURE — 86901 BLOOD TYPING SEROLOGIC RH(D): CPT | Performed by: EMERGENCY MEDICINE

## 2022-01-01 PROCEDURE — 25010000002 ENOXAPARIN PER 10 MG: Performed by: NURSE PRACTITIONER

## 2022-01-01 PROCEDURE — 87186 SC STD MICRODIL/AGAR DIL: CPT | Performed by: EMERGENCY MEDICINE

## 2022-01-01 PROCEDURE — 87186 SC STD MICRODIL/AGAR DIL: CPT | Performed by: INTERNAL MEDICINE

## 2022-01-01 PROCEDURE — 36415 COLL VENOUS BLD VENIPUNCTURE: CPT | Performed by: EMERGENCY MEDICINE

## 2022-01-01 PROCEDURE — 71250 CT THORAX DX C-: CPT

## 2022-01-01 PROCEDURE — 25010000002 MEPIVACAINE PF 1 % SOLUTION: Performed by: ANESTHESIOLOGY

## 2022-01-01 PROCEDURE — P9047 ALBUMIN (HUMAN), 25%, 50ML: HCPCS | Performed by: INTERNAL MEDICINE

## 2022-01-01 PROCEDURE — 97161 PT EVAL LOW COMPLEX 20 MIN: CPT

## 2022-01-01 PROCEDURE — 85730 THROMBOPLASTIN TIME PARTIAL: CPT | Performed by: EMERGENCY MEDICINE

## 2022-01-01 PROCEDURE — 88305 TISSUE EXAM BY PATHOLOGIST: CPT | Performed by: INTERNAL MEDICINE

## 2022-01-01 PROCEDURE — 36415 COLL VENOUS BLD VENIPUNCTURE: CPT

## 2022-01-01 PROCEDURE — 70544 MR ANGIOGRAPHY HEAD W/O DYE: CPT

## 2022-01-01 PROCEDURE — 84439 ASSAY OF FREE THYROXINE: CPT | Performed by: HOSPITALIST

## 2022-01-01 PROCEDURE — 94760 N-INVAS EAR/PLS OXIMETRY 1: CPT

## 2022-01-01 PROCEDURE — 87150 DNA/RNA AMPLIFIED PROBE: CPT | Performed by: EMERGENCY MEDICINE

## 2022-01-01 PROCEDURE — 99153 MOD SED SAME PHYS/QHP EA: CPT | Performed by: INTERNAL MEDICINE

## 2022-01-01 PROCEDURE — U0004 COV-19 TEST NON-CDC HGH THRU: HCPCS | Performed by: SURGERY

## 2022-01-01 PROCEDURE — 87186 SC STD MICRODIL/AGAR DIL: CPT | Performed by: STUDENT IN AN ORGANIZED HEALTH CARE EDUCATION/TRAINING PROGRAM

## 2022-01-01 PROCEDURE — 87040 BLOOD CULTURE FOR BACTERIA: CPT | Performed by: EMERGENCY MEDICINE

## 2022-01-01 PROCEDURE — 87205 SMEAR GRAM STAIN: CPT | Performed by: NURSE PRACTITIONER

## 2022-01-01 PROCEDURE — 25010000002 FUROSEMIDE PER 20 MG: Performed by: INTERNAL MEDICINE

## 2022-01-01 PROCEDURE — 36410 VNPNXR 3YR/> PHY/QHP DX/THER: CPT

## 2022-01-01 PROCEDURE — 36415 COLL VENOUS BLD VENIPUNCTURE: CPT | Performed by: NURSE PRACTITIONER

## 2022-01-01 PROCEDURE — 87070 CULTURE OTHR SPECIMN AEROBIC: CPT | Performed by: INTERNAL MEDICINE

## 2022-01-01 PROCEDURE — 96375 TX/PRO/DX INJ NEW DRUG ADDON: CPT

## 2022-01-01 PROCEDURE — 92507 TX SP LANG VOICE COMM INDIV: CPT

## 2022-01-01 PROCEDURE — 85025 COMPLETE CBC W/AUTO DIFF WBC: CPT | Performed by: PHYSICIAN ASSISTANT

## 2022-01-01 PROCEDURE — 25010000002 MIDAZOLAM PER 1 MG: Performed by: ANESTHESIOLOGY

## 2022-01-01 PROCEDURE — 99239 HOSP IP/OBS DSCHRG MGMT >30: CPT | Performed by: HOSPITALIST

## 2022-01-01 PROCEDURE — 92928 PRQ TCAT PLMT NTRAC ST 1 LES: CPT | Performed by: INTERNAL MEDICINE

## 2022-01-01 PROCEDURE — 74230 X-RAY XM SWLNG FUNCJ C+: CPT

## 2022-01-01 PROCEDURE — 73718 MRI LOWER EXTREMITY W/O DYE: CPT

## 2022-01-01 PROCEDURE — 25010000002 CEFTRIAXONE PER 250 MG

## 2022-01-01 PROCEDURE — 87086 URINE CULTURE/COLONY COUNT: CPT | Performed by: STUDENT IN AN ORGANIZED HEALTH CARE EDUCATION/TRAINING PROGRAM

## 2022-01-01 PROCEDURE — 87205 SMEAR GRAM STAIN: CPT | Performed by: INTERNAL MEDICINE

## 2022-01-01 PROCEDURE — 74176 CT ABD & PELVIS W/O CONTRAST: CPT

## 2022-01-01 PROCEDURE — 36556 INSERT NON-TUNNEL CV CATH: CPT

## 2022-01-01 PROCEDURE — 25010000002 PROPOFOL 10 MG/ML EMULSION

## 2022-01-01 PROCEDURE — 83036 HEMOGLOBIN GLYCOSYLATED A1C: CPT | Performed by: INTERNAL MEDICINE

## 2022-01-01 PROCEDURE — 87102 FUNGUS ISOLATION CULTURE: CPT | Performed by: EMERGENCY MEDICINE

## 2022-01-01 PROCEDURE — P9045 ALBUMIN (HUMAN), 5%, 250 ML: HCPCS

## 2022-01-01 PROCEDURE — 99239 HOSP IP/OBS DSCHRG MGMT >30: CPT | Performed by: INTERNAL MEDICINE

## 2022-01-01 PROCEDURE — 0 IOPAMIDOL PER 1 ML: Performed by: SURGERY

## 2022-01-01 PROCEDURE — 72131 CT LUMBAR SPINE W/O DYE: CPT

## 2022-01-01 PROCEDURE — 76937 US GUIDE VASCULAR ACCESS: CPT

## 2022-01-01 PROCEDURE — 82330 ASSAY OF CALCIUM: CPT | Performed by: STUDENT IN AN ORGANIZED HEALTH CARE EDUCATION/TRAINING PROGRAM

## 2022-01-01 PROCEDURE — 25010000002 AMIODARONE IN DEXTROSE 5% 150-4.21 MG/100ML-% SOLUTION: Performed by: INTERNAL MEDICINE

## 2022-01-01 DEVICE — IMPLANTABLE DEVICE: Type: IMPLANTABLE DEVICE | Site: ARM | Status: FUNCTIONAL

## 2022-01-01 DEVICE — XIENCE SKYPOINT™ EVEROLIMUS ELUTING CORONARY STENT SYSTEM 3.00 MM X 23 MM / RAPID-EXCHANGE
Type: IMPLANTABLE DEVICE | Site: CORONARY | Status: FUNCTIONAL
Brand: XIENCE SKYPOINT™

## 2022-01-01 DEVICE — LIGACLIP MCA MULTIPLE CLIP APPLIERS, 20 SMALL CLIPS
Type: IMPLANTABLE DEVICE | Site: ARM | Status: FUNCTIONAL
Brand: LIGACLIP

## 2022-01-01 RX ORDER — ASPIRIN 325 MG
TABLET ORAL AS NEEDED
Status: DISCONTINUED | OUTPATIENT
Start: 2022-01-01 | End: 2022-01-01 | Stop reason: HOSPADM

## 2022-01-01 RX ORDER — DIPHENHYDRAMINE HYDROCHLORIDE 50 MG/ML
12.5 INJECTION INTRAMUSCULAR; INTRAVENOUS
Status: DISCONTINUED | OUTPATIENT
Start: 2022-01-01 | End: 2022-01-01 | Stop reason: HOSPADM

## 2022-01-01 RX ORDER — HYDROCODONE BITARTRATE AND ACETAMINOPHEN 5; 325 MG/1; MG/1
1 TABLET ORAL 3 TIMES DAILY
COMMUNITY

## 2022-01-01 RX ORDER — ASPIRIN 81 MG/1
81 TABLET ORAL DAILY
Status: DISCONTINUED | OUTPATIENT
Start: 2022-01-01 | End: 2022-01-01

## 2022-01-01 RX ORDER — PANTOPRAZOLE SODIUM 40 MG/1
40 TABLET, DELAYED RELEASE ORAL
Status: DISCONTINUED | OUTPATIENT
Start: 2022-01-01 | End: 2022-01-01 | Stop reason: HOSPADM

## 2022-01-01 RX ORDER — FLUMAZENIL 0.1 MG/ML
0.2 INJECTION INTRAVENOUS AS NEEDED
Status: DISCONTINUED | OUTPATIENT
Start: 2022-01-01 | End: 2022-01-01 | Stop reason: HOSPADM

## 2022-01-01 RX ORDER — DOCUSATE SODIUM 100 MG/1
100 CAPSULE, LIQUID FILLED ORAL DAILY
Status: DISCONTINUED | OUTPATIENT
Start: 2022-01-01 | End: 2022-01-01 | Stop reason: HOSPADM

## 2022-01-01 RX ORDER — NALOXONE HCL 0.4 MG/ML
0.4 VIAL (ML) INJECTION AS NEEDED
Status: DISCONTINUED | OUTPATIENT
Start: 2022-01-01 | End: 2022-01-01 | Stop reason: HOSPADM

## 2022-01-01 RX ORDER — INSULIN LISPRO 100 [IU]/ML
0-7 INJECTION, SOLUTION INTRAVENOUS; SUBCUTANEOUS AS NEEDED
Status: DISCONTINUED | OUTPATIENT
Start: 2022-01-01 | End: 2022-01-01 | Stop reason: HOSPADM

## 2022-01-01 RX ORDER — MIDAZOLAM HYDROCHLORIDE 1 MG/ML
INJECTION INTRAMUSCULAR; INTRAVENOUS
Status: DISPENSED
Start: 2022-01-01 | End: 2022-01-01

## 2022-01-01 RX ORDER — ACETAMINOPHEN 325 MG/1
650 TABLET ORAL EVERY 6 HOURS PRN
Status: DISCONTINUED | OUTPATIENT
Start: 2022-01-01 | End: 2022-01-01 | Stop reason: HOSPADM

## 2022-01-01 RX ORDER — INSULIN LISPRO 100 [IU]/ML
0-9 INJECTION, SOLUTION INTRAVENOUS; SUBCUTANEOUS
Status: DISCONTINUED | OUTPATIENT
Start: 2022-01-01 | End: 2022-01-01 | Stop reason: HOSPADM

## 2022-01-01 RX ORDER — SODIUM CHLORIDE 0.9 % (FLUSH) 0.9 %
10 SYRINGE (ML) INJECTION AS NEEDED
Status: CANCELLED | OUTPATIENT
Start: 2022-01-01

## 2022-01-01 RX ORDER — BISACODYL 10 MG
10 SUPPOSITORY, RECTAL RECTAL DAILY PRN
COMMUNITY

## 2022-01-01 RX ORDER — BUDESONIDE 0.5 MG/2ML
0.5 INHALANT ORAL 2 TIMES DAILY
Start: 2022-01-01

## 2022-01-01 RX ORDER — LANOLIN ALCOHOL/MO/W.PET/CERES
1000 CREAM (GRAM) TOPICAL DAILY
Status: DISCONTINUED | OUTPATIENT
Start: 2022-01-01 | End: 2022-01-01 | Stop reason: HOSPADM

## 2022-01-01 RX ORDER — SODIUM CHLORIDE 0.9 % (FLUSH) 0.9 %
10 SYRINGE (ML) INJECTION AS NEEDED
Status: DISCONTINUED | OUTPATIENT
Start: 2022-01-01 | End: 2022-01-01 | Stop reason: HOSPADM

## 2022-01-01 RX ORDER — ACETAMINOPHEN 650 MG/1
650 SUPPOSITORY RECTAL EVERY 4 HOURS PRN
Status: DISCONTINUED | OUTPATIENT
Start: 2022-01-01 | End: 2022-01-01 | Stop reason: HOSPADM

## 2022-01-01 RX ORDER — SODIUM, POTASSIUM,MAG SULFATES 17.5-3.13G
1 SOLUTION, RECONSTITUTED, ORAL ORAL EVERY 12 HOURS
Status: DISPENSED | OUTPATIENT
Start: 2022-01-01 | End: 2022-01-01

## 2022-01-01 RX ORDER — PHENYLEPHRINE HYDROCHLORIDE 10 MG/ML
INJECTION INTRAVENOUS AS NEEDED
Status: DISCONTINUED | OUTPATIENT
Start: 2022-01-01 | End: 2022-01-01 | Stop reason: HOSPADM

## 2022-01-01 RX ORDER — SORBITOL SOLUTION 70 %
30 SOLUTION, ORAL MISCELLANEOUS
Status: COMPLETED | OUTPATIENT
Start: 2022-01-01 | End: 2022-01-01

## 2022-01-01 RX ORDER — SODIUM CHLORIDE 0.9 % (FLUSH) 0.9 %
10 SYRINGE (ML) INJECTION EVERY 12 HOURS SCHEDULED
Status: CANCELLED | OUTPATIENT
Start: 2022-01-01

## 2022-01-01 RX ORDER — HYDROCODONE BITARTRATE AND ACETAMINOPHEN 5; 325 MG/1; MG/1
1 TABLET ORAL EVERY 8 HOURS PRN
Status: CANCELLED | OUTPATIENT
Start: 2022-01-01 | End: 2022-01-01

## 2022-01-01 RX ORDER — ONDANSETRON 4 MG/1
4 TABLET, ORALLY DISINTEGRATING ORAL EVERY 8 HOURS PRN
COMMUNITY
End: 2022-01-01 | Stop reason: HOSPADM

## 2022-01-01 RX ORDER — PRIMIDONE 50 MG/1
25 TABLET ORAL DAILY
Status: ON HOLD | COMMUNITY
End: 2022-01-01

## 2022-01-01 RX ORDER — FUROSEMIDE 10 MG/ML
40 INJECTION INTRAMUSCULAR; INTRAVENOUS EVERY 8 HOURS
Status: DISCONTINUED | OUTPATIENT
Start: 2022-01-01 | End: 2022-01-01

## 2022-01-01 RX ORDER — POTASSIUM CHLORIDE 20 MEQ/1
40 TABLET, EXTENDED RELEASE ORAL ONCE
Status: COMPLETED | OUTPATIENT
Start: 2022-01-01 | End: 2022-01-01

## 2022-01-01 RX ORDER — COCOA BUTTER, PHENYLEPHRINE HYDROCHLORIDE 2211; 6.25 MG/1; MG/1
1 SUPPOSITORY RECTAL DAILY PRN
Status: ON HOLD | COMMUNITY
End: 2022-01-01

## 2022-01-01 RX ORDER — LEVOTHYROXINE SODIUM 0.05 MG/1
50 TABLET ORAL
COMMUNITY

## 2022-01-01 RX ORDER — SODIUM CHLORIDE 0.9 % (FLUSH) 0.9 %
3-10 SYRINGE (ML) INJECTION AS NEEDED
Status: DISCONTINUED | OUTPATIENT
Start: 2022-01-01 | End: 2022-01-01 | Stop reason: HOSPADM

## 2022-01-01 RX ORDER — IPRATROPIUM BROMIDE AND ALBUTEROL SULFATE 2.5; .5 MG/3ML; MG/3ML
3 SOLUTION RESPIRATORY (INHALATION) ONCE
Status: COMPLETED | OUTPATIENT
Start: 2022-01-01 | End: 2022-01-01

## 2022-01-01 RX ORDER — ALBUTEROL SULFATE 2.5 MG/3ML
2.5 SOLUTION RESPIRATORY (INHALATION)
Status: DISCONTINUED | OUTPATIENT
Start: 2022-01-01 | End: 2022-01-01

## 2022-01-01 RX ORDER — ONDANSETRON 4 MG/1
4 TABLET, FILM COATED ORAL EVERY 8 HOURS PRN
Status: ON HOLD | COMMUNITY
End: 2022-01-01

## 2022-01-01 RX ORDER — MORPHINE SULFATE 2 MG/ML
2 INJECTION, SOLUTION INTRAMUSCULAR; INTRAVENOUS
Status: DISPENSED | OUTPATIENT
Start: 2022-01-01 | End: 2022-01-01

## 2022-01-01 RX ORDER — MIDODRINE HYDROCHLORIDE 5 MG/1
10 TABLET ORAL
Status: DISCONTINUED | OUTPATIENT
Start: 2022-01-01 | End: 2022-01-01

## 2022-01-01 RX ORDER — IPRATROPIUM BROMIDE AND ALBUTEROL SULFATE 2.5; .5 MG/3ML; MG/3ML
3 SOLUTION RESPIRATORY (INHALATION)
Qty: 360 ML | Status: ON HOLD
Start: 2022-01-01 | End: 2022-01-01

## 2022-01-01 RX ORDER — IPRATROPIUM BROMIDE AND ALBUTEROL SULFATE 2.5; .5 MG/3ML; MG/3ML
3 SOLUTION RESPIRATORY (INHALATION) ONCE AS NEEDED
Status: DISCONTINUED | OUTPATIENT
Start: 2022-01-01 | End: 2022-01-01 | Stop reason: HOSPADM

## 2022-01-01 RX ORDER — ATORVASTATIN CALCIUM 40 MG/1
40 TABLET, FILM COATED ORAL NIGHTLY
Status: DISCONTINUED | OUTPATIENT
Start: 2022-01-01 | End: 2022-01-01 | Stop reason: HOSPADM

## 2022-01-01 RX ORDER — HEPARIN SODIUM 1000 [USP'U]/ML
4000 INJECTION, SOLUTION INTRAVENOUS; SUBCUTANEOUS AS NEEDED
Status: DISCONTINUED | OUTPATIENT
Start: 2022-01-01 | End: 2022-01-01 | Stop reason: HOSPADM

## 2022-01-01 RX ORDER — IPRATROPIUM BROMIDE AND ALBUTEROL SULFATE 2.5; .5 MG/3ML; MG/3ML
3 SOLUTION RESPIRATORY (INHALATION) EVERY 4 HOURS PRN
Status: DISCONTINUED | OUTPATIENT
Start: 2022-01-01 | End: 2022-01-01 | Stop reason: HOSPADM

## 2022-01-01 RX ORDER — GUAIFENESIN 600 MG/1
1200 TABLET, EXTENDED RELEASE ORAL EVERY 12 HOURS SCHEDULED
Status: DISCONTINUED | OUTPATIENT
Start: 2022-01-01 | End: 2022-01-01 | Stop reason: HOSPADM

## 2022-01-01 RX ORDER — LEVOTHYROXINE SODIUM 0.05 MG/1
50 TABLET ORAL
Status: DISCONTINUED | OUTPATIENT
Start: 2022-01-01 | End: 2022-01-01 | Stop reason: HOSPADM

## 2022-01-01 RX ORDER — BISACODYL 10 MG
10 SUPPOSITORY, RECTAL RECTAL DAILY
Qty: 5 SUPPOSITORY | Refills: 0 | Status: ON HOLD | OUTPATIENT
Start: 2022-01-01 | End: 2022-01-01

## 2022-01-01 RX ORDER — OLANZAPINE 10 MG/2ML
1 INJECTION, POWDER, LYOPHILIZED, FOR SOLUTION INTRAMUSCULAR
Status: DISCONTINUED | OUTPATIENT
Start: 2022-01-01 | End: 2022-01-01 | Stop reason: HOSPADM

## 2022-01-01 RX ORDER — MIDODRINE HYDROCHLORIDE 5 MG/1
15 TABLET ORAL
Status: DISCONTINUED | OUTPATIENT
Start: 2022-01-01 | End: 2022-01-01 | Stop reason: HOSPADM

## 2022-01-01 RX ORDER — GLUCAGON 3 MG/1
3 POWDER NASAL EVERY 12 HOURS PRN
COMMUNITY
End: 2022-01-01 | Stop reason: HOSPADM

## 2022-01-01 RX ORDER — IPRATROPIUM BROMIDE AND ALBUTEROL SULFATE 2.5; .5 MG/3ML; MG/3ML
3 SOLUTION RESPIRATORY (INHALATION)
Status: DISCONTINUED | OUTPATIENT
Start: 2022-01-01 | End: 2022-01-01 | Stop reason: HOSPADM

## 2022-01-01 RX ORDER — IBUPROFEN 200 MG
1 TABLET ORAL NIGHTLY
Status: ON HOLD | COMMUNITY
End: 2022-01-01

## 2022-01-01 RX ORDER — PANTOPRAZOLE SODIUM 40 MG/1
40 TABLET, DELAYED RELEASE ORAL EVERY MORNING
Status: DISCONTINUED | OUTPATIENT
Start: 2022-01-01 | End: 2022-01-01 | Stop reason: HOSPADM

## 2022-01-01 RX ORDER — ALBUMIN (HUMAN) 12.5 G/50ML
25 SOLUTION INTRAVENOUS AS NEEDED
Status: DISCONTINUED | OUTPATIENT
Start: 2022-01-01 | End: 2022-01-01 | Stop reason: HOSPADM

## 2022-01-01 RX ORDER — PANTOPRAZOLE SODIUM 40 MG/10ML
40 INJECTION, POWDER, LYOPHILIZED, FOR SOLUTION INTRAVENOUS EVERY 12 HOURS SCHEDULED
Status: DISCONTINUED | OUTPATIENT
Start: 2022-01-01 | End: 2022-01-01

## 2022-01-01 RX ORDER — MIDAZOLAM HYDROCHLORIDE 1 MG/ML
1 INJECTION INTRAMUSCULAR; INTRAVENOUS ONCE
Status: CANCELLED | OUTPATIENT
Start: 2022-01-01 | End: 2022-01-01

## 2022-01-01 RX ORDER — AMIODARONE HYDROCHLORIDE 200 MG/1
200 TABLET ORAL
Qty: 30 TABLET | Refills: 0 | Status: SHIPPED | OUTPATIENT
Start: 2022-01-01

## 2022-01-01 RX ORDER — IPRATROPIUM BROMIDE AND ALBUTEROL SULFATE 2.5; .5 MG/3ML; MG/3ML
3 SOLUTION RESPIRATORY (INHALATION)
Status: DISCONTINUED | OUTPATIENT
Start: 2022-01-01 | End: 2022-01-01

## 2022-01-01 RX ORDER — METHYLPREDNISOLONE SODIUM SUCCINATE 125 MG/2ML
125 INJECTION, POWDER, LYOPHILIZED, FOR SOLUTION INTRAMUSCULAR; INTRAVENOUS ONCE
Status: COMPLETED | OUTPATIENT
Start: 2022-01-01 | End: 2022-01-01

## 2022-01-01 RX ORDER — ENOXAPARIN SODIUM 100 MG/ML
1 INJECTION SUBCUTANEOUS EVERY 24 HOURS
Status: DISCONTINUED | OUTPATIENT
Start: 2022-01-01 | End: 2022-01-01

## 2022-01-01 RX ORDER — MIDODRINE HYDROCHLORIDE 5 MG/1
10 TABLET ORAL
Status: DISCONTINUED | OUTPATIENT
Start: 2022-01-01 | End: 2022-01-01 | Stop reason: HOSPADM

## 2022-01-01 RX ORDER — HEPARIN SODIUM 1000 [USP'U]/ML
2000 INJECTION, SOLUTION INTRAVENOUS; SUBCUTANEOUS ONCE
Status: COMPLETED | OUTPATIENT
Start: 2022-01-01 | End: 2022-01-01

## 2022-01-01 RX ORDER — LIDOCAINE HYDROCHLORIDE 20 MG/ML
INJECTION, SOLUTION INFILTRATION; PERINEURAL AS NEEDED
Status: DISCONTINUED | OUTPATIENT
Start: 2022-01-01 | End: 2022-01-01 | Stop reason: HOSPADM

## 2022-01-01 RX ORDER — BUDESONIDE 0.5 MG/2ML
0.5 INHALANT ORAL 2 TIMES DAILY
Status: DISCONTINUED | OUTPATIENT
Start: 2022-01-01 | End: 2022-01-01 | Stop reason: HOSPADM

## 2022-01-01 RX ORDER — PRIMIDONE 50 MG/1
25 TABLET ORAL DAILY
Status: DISCONTINUED | OUTPATIENT
Start: 2022-01-01 | End: 2022-01-01 | Stop reason: HOSPADM

## 2022-01-01 RX ORDER — PHENYLEPHRINE HCL IN 0.9% NACL 1 MG/10 ML
SYRINGE (ML) INTRAVENOUS AS NEEDED
Status: DISCONTINUED | OUTPATIENT
Start: 2022-01-01 | End: 2022-01-01 | Stop reason: SURG

## 2022-01-01 RX ORDER — AMIODARONE HYDROCHLORIDE 200 MG/1
200 TABLET ORAL
Status: DISCONTINUED | OUTPATIENT
Start: 2022-01-01 | End: 2022-01-01 | Stop reason: HOSPADM

## 2022-01-01 RX ORDER — CALCIUM CARBONATE 200(500)MG
2 TABLET,CHEWABLE ORAL 3 TIMES DAILY PRN
Status: DISCONTINUED | OUTPATIENT
Start: 2022-01-01 | End: 2022-01-01 | Stop reason: HOSPADM

## 2022-01-01 RX ORDER — MORPHINE SULFATE 2 MG/ML
2 INJECTION, SOLUTION INTRAMUSCULAR; INTRAVENOUS ONCE
Status: COMPLETED | OUTPATIENT
Start: 2022-01-01 | End: 2022-01-01

## 2022-01-01 RX ORDER — ACETAMINOPHEN 160 MG/5ML
650 SOLUTION ORAL EVERY 4 HOURS PRN
Status: DISCONTINUED | OUTPATIENT
Start: 2022-01-01 | End: 2022-01-01 | Stop reason: HOSPADM

## 2022-01-01 RX ORDER — DEXTROSE MONOHYDRATE 25 G/50ML
10-50 INJECTION, SOLUTION INTRAVENOUS
Status: CANCELLED | OUTPATIENT
Start: 2022-01-01

## 2022-01-01 RX ORDER — HYDROCODONE BITARTRATE AND ACETAMINOPHEN 5; 325 MG/1; MG/1
1 TABLET ORAL 3 TIMES DAILY
Status: DISCONTINUED | OUTPATIENT
Start: 2022-01-01 | End: 2022-01-01

## 2022-01-01 RX ORDER — NITROGLYCERIN 5 MG/ML
INJECTION, SOLUTION INTRAVENOUS AS NEEDED
Status: DISCONTINUED | OUTPATIENT
Start: 2022-01-01 | End: 2022-01-01 | Stop reason: HOSPADM

## 2022-01-01 RX ORDER — LIDOCAINE HYDROCHLORIDE 10 MG/ML
20 INJECTION, SOLUTION INFILTRATION; PERINEURAL ONCE
Status: COMPLETED | OUTPATIENT
Start: 2022-01-01 | End: 2022-01-01

## 2022-01-01 RX ORDER — ALBUMIN, HUMAN INJ 5% 5 %
250 SOLUTION INTRAVENOUS ONCE
Status: COMPLETED | OUTPATIENT
Start: 2022-01-01 | End: 2022-01-01

## 2022-01-01 RX ORDER — ATORVASTATIN CALCIUM 40 MG/1
40 TABLET, FILM COATED ORAL DAILY
Status: DISCONTINUED | OUTPATIENT
Start: 2022-01-01 | End: 2022-01-01 | Stop reason: HOSPADM

## 2022-01-01 RX ORDER — FLUMAZENIL 0.1 MG/ML
0.5 INJECTION INTRAVENOUS AS NEEDED
Status: CANCELLED | OUTPATIENT
Start: 2022-01-01

## 2022-01-01 RX ORDER — LIDOCAINE HYDROCHLORIDE 10 MG/ML
INJECTION, SOLUTION EPIDURAL; INFILTRATION; INTRACAUDAL; PERINEURAL AS NEEDED
Status: DISCONTINUED | OUTPATIENT
Start: 2022-01-01 | End: 2022-01-01 | Stop reason: SURG

## 2022-01-01 RX ORDER — OLANZAPINE 10 MG/2ML
1 INJECTION, POWDER, LYOPHILIZED, FOR SOLUTION INTRAMUSCULAR
Status: CANCELLED | OUTPATIENT
Start: 2022-01-01

## 2022-01-01 RX ORDER — IBUPROFEN 600 MG/1
1 TABLET ORAL DAILY PRN
Status: ON HOLD | COMMUNITY
End: 2022-01-01

## 2022-01-01 RX ORDER — NALOXONE HCL 0.4 MG/ML
0.4 VIAL (ML) INJECTION AS NEEDED
Status: CANCELLED | OUTPATIENT
Start: 2022-01-01

## 2022-01-01 RX ORDER — LIDOCAINE HYDROCHLORIDE 20 MG/ML
INJECTION, SOLUTION EPIDURAL; INFILTRATION; INTRACAUDAL; PERINEURAL AS NEEDED
Status: DISCONTINUED | OUTPATIENT
Start: 2022-01-01 | End: 2022-01-01 | Stop reason: SURG

## 2022-01-01 RX ORDER — INSULIN LISPRO 100 [IU]/ML
0-14 INJECTION, SOLUTION INTRAVENOUS; SUBCUTANEOUS EVERY 6 HOURS SCHEDULED
Status: DISCONTINUED | OUTPATIENT
Start: 2022-01-01 | End: 2022-01-01

## 2022-01-01 RX ORDER — SODIUM CHLORIDE 9 MG/ML
INJECTION, SOLUTION INTRAVENOUS CONTINUOUS PRN
Status: DISCONTINUED | OUTPATIENT
Start: 2022-01-01 | End: 2022-01-01 | Stop reason: SURG

## 2022-01-01 RX ORDER — INSULIN LISPRO 100 [IU]/ML
0-7 INJECTION, SOLUTION INTRAVENOUS; SUBCUTANEOUS
Status: DISCONTINUED | OUTPATIENT
Start: 2022-01-01 | End: 2022-01-01

## 2022-01-01 RX ORDER — NICOTINE POLACRILEX 4 MG
15 LOZENGE BUCCAL
Status: DISCONTINUED | OUTPATIENT
Start: 2022-01-01 | End: 2022-01-01 | Stop reason: HOSPADM

## 2022-01-01 RX ORDER — BUDESONIDE 0.5 MG/2ML
0.5 INHALANT ORAL
Status: DISCONTINUED | OUTPATIENT
Start: 2022-01-01 | End: 2022-01-01 | Stop reason: HOSPADM

## 2022-01-01 RX ORDER — OXYCODONE HYDROCHLORIDE 5 MG/1
5 TABLET ORAL EVERY 4 HOURS PRN
Status: DISPENSED | OUTPATIENT
Start: 2022-01-01 | End: 2022-01-01

## 2022-01-01 RX ORDER — GABAPENTIN 100 MG/1
100 CAPSULE ORAL 2 TIMES DAILY
COMMUNITY
End: 2022-01-01 | Stop reason: HOSPADM

## 2022-01-01 RX ORDER — ALBUMIN (HUMAN) 12.5 G/50ML
12.5 SOLUTION INTRAVENOUS AS NEEDED
Status: ACTIVE | OUTPATIENT
Start: 2022-01-01 | End: 2022-01-01

## 2022-01-01 RX ORDER — ACETAMINOPHEN 325 MG/1
650 TABLET ORAL EVERY 4 HOURS PRN
Status: DISCONTINUED | OUTPATIENT
Start: 2022-01-01 | End: 2022-01-01 | Stop reason: HOSPADM

## 2022-01-01 RX ORDER — GUAIFENESIN 600 MG/1
1200 TABLET, EXTENDED RELEASE ORAL EVERY 12 HOURS PRN
Status: ON HOLD | COMMUNITY
End: 2022-01-01

## 2022-01-01 RX ORDER — HYDROCODONE BITARTRATE AND ACETAMINOPHEN 7.5; 325 MG/1; MG/1
1 TABLET ORAL 2 TIMES DAILY
Status: DISCONTINUED | OUTPATIENT
Start: 2022-01-01 | End: 2022-01-01 | Stop reason: HOSPADM

## 2022-01-01 RX ORDER — ALBUTEROL SULFATE 2.5 MG/3ML
2.5 SOLUTION RESPIRATORY (INHALATION) ONCE AS NEEDED
Status: DISCONTINUED | OUTPATIENT
Start: 2022-01-01 | End: 2022-01-01 | Stop reason: HOSPADM

## 2022-01-01 RX ORDER — OLANZAPINE 10 MG/2ML
1 INJECTION, POWDER, LYOPHILIZED, FOR SOLUTION INTRAMUSCULAR AS NEEDED
Status: DISCONTINUED | OUTPATIENT
Start: 2022-01-01 | End: 2022-01-01 | Stop reason: HOSPADM

## 2022-01-01 RX ORDER — LIDOCAINE HYDROCHLORIDE 10 MG/ML
5 INJECTION, SOLUTION INFILTRATION; PERINEURAL ONCE
Status: DISCONTINUED | OUTPATIENT
Start: 2022-01-01 | End: 2022-01-01 | Stop reason: HOSPADM

## 2022-01-01 RX ORDER — BACITRACIN, NEOMYCIN, POLYMYXIN B 400; 3.5; 5 [USP'U]/G; MG/G; [USP'U]/G
1 OINTMENT TOPICAL NIGHTLY
Status: DISCONTINUED | OUTPATIENT
Start: 2022-01-01 | End: 2022-01-01 | Stop reason: HOSPADM

## 2022-01-01 RX ORDER — PROPOFOL 10 MG/ML
VIAL (ML) INTRAVENOUS AS NEEDED
Status: DISCONTINUED | OUTPATIENT
Start: 2022-01-01 | End: 2022-01-01 | Stop reason: SURG

## 2022-01-01 RX ORDER — SORBITOL SOLUTION 70 %
50 SOLUTION, ORAL MISCELLANEOUS DAILY
Status: DISCONTINUED | OUTPATIENT
Start: 2022-01-01 | End: 2022-01-01 | Stop reason: HOSPADM

## 2022-01-01 RX ORDER — GUAIFENESIN 600 MG/1
600 TABLET, EXTENDED RELEASE ORAL 2 TIMES DAILY
Status: DISCONTINUED | OUTPATIENT
Start: 2022-01-01 | End: 2022-01-01 | Stop reason: HOSPADM

## 2022-01-01 RX ORDER — PROMETHAZINE HYDROCHLORIDE 25 MG/1
25 SUPPOSITORY RECTAL ONCE AS NEEDED
Status: CANCELLED | OUTPATIENT
Start: 2022-01-01

## 2022-01-01 RX ORDER — ALBUMIN (HUMAN) 12.5 G/50ML
25 SOLUTION INTRAVENOUS AS NEEDED
Status: ACTIVE | OUTPATIENT
Start: 2022-01-01 | End: 2022-01-01

## 2022-01-01 RX ORDER — INSULIN LISPRO 100 [IU]/ML
0-9 INJECTION, SOLUTION INTRAVENOUS; SUBCUTANEOUS AS NEEDED
Status: DISCONTINUED | OUTPATIENT
Start: 2022-01-01 | End: 2022-01-01 | Stop reason: HOSPADM

## 2022-01-01 RX ORDER — ALBUTEROL SULFATE 90 UG/1
2 AEROSOL, METERED RESPIRATORY (INHALATION)
COMMUNITY

## 2022-01-01 RX ORDER — INSULIN LISPRO 100 [IU]/ML
0-14 INJECTION, SOLUTION INTRAVENOUS; SUBCUTANEOUS AS NEEDED
Status: DISCONTINUED | OUTPATIENT
Start: 2022-01-01 | End: 2022-01-01

## 2022-01-01 RX ORDER — ETOMIDATE 2 MG/ML
INJECTION INTRAVENOUS
Status: DISCONTINUED
Start: 2022-01-01 | End: 2022-01-01 | Stop reason: WASHOUT

## 2022-01-01 RX ORDER — ALBUTEROL SULFATE 2.5 MG/3ML
2.5 SOLUTION RESPIRATORY (INHALATION) EVERY 4 HOURS PRN
Status: ON HOLD | COMMUNITY
End: 2022-01-01

## 2022-01-01 RX ORDER — ONDANSETRON 2 MG/ML
4 INJECTION INTRAMUSCULAR; INTRAVENOUS EVERY 6 HOURS PRN
Status: DISCONTINUED | OUTPATIENT
Start: 2022-01-01 | End: 2022-01-01 | Stop reason: HOSPADM

## 2022-01-01 RX ORDER — BISACODYL 5 MG/1
20 TABLET, DELAYED RELEASE ORAL ONCE
Status: COMPLETED | OUTPATIENT
Start: 2022-01-01 | End: 2022-01-01

## 2022-01-01 RX ORDER — IBUPROFEN 800 MG/1
1 TABLET ORAL NIGHTLY
COMMUNITY
End: 2022-01-01 | Stop reason: HOSPADM

## 2022-01-01 RX ORDER — ONDANSETRON 2 MG/ML
4 INJECTION INTRAMUSCULAR; INTRAVENOUS ONCE AS NEEDED
Status: CANCELLED | OUTPATIENT
Start: 2022-01-01

## 2022-01-01 RX ORDER — SODIUM CHLORIDE 0.9 % (FLUSH) 0.9 %
3-10 SYRINGE (ML) INJECTION AS NEEDED
Status: CANCELLED | OUTPATIENT
Start: 2022-01-01

## 2022-01-01 RX ORDER — HYDROMORPHONE HCL 110MG/55ML
0.5 PATIENT CONTROLLED ANALGESIA SYRINGE INTRAVENOUS
Status: DISCONTINUED | OUTPATIENT
Start: 2022-01-01 | End: 2022-01-01 | Stop reason: HOSPADM

## 2022-01-01 RX ORDER — ONDANSETRON 4 MG/1
4 TABLET, FILM COATED ORAL EVERY 8 HOURS PRN
COMMUNITY

## 2022-01-01 RX ORDER — SODIUM CHLORIDE, SODIUM LACTATE, POTASSIUM CHLORIDE, CALCIUM CHLORIDE 600; 310; 30; 20 MG/100ML; MG/100ML; MG/100ML; MG/100ML
100 INJECTION, SOLUTION INTRAVENOUS CONTINUOUS
Status: DISCONTINUED | OUTPATIENT
Start: 2022-01-01 | End: 2022-01-01 | Stop reason: HOSPADM

## 2022-01-01 RX ORDER — GABAPENTIN 100 MG/1
100 CAPSULE ORAL 2 TIMES DAILY
Status: DISCONTINUED | OUTPATIENT
Start: 2022-01-01 | End: 2022-01-01 | Stop reason: HOSPADM

## 2022-01-01 RX ORDER — PROMETHAZINE HYDROCHLORIDE 25 MG/1
25 TABLET ORAL ONCE AS NEEDED
Status: DISCONTINUED | OUTPATIENT
Start: 2022-01-01 | End: 2022-01-01 | Stop reason: HOSPADM

## 2022-01-01 RX ORDER — POLYETHYLENE GLYCOL 3350 17 G/17G
17 POWDER, FOR SOLUTION ORAL 2 TIMES DAILY
COMMUNITY

## 2022-01-01 RX ORDER — LEVOTHYROXINE SODIUM 0.05 MG/1
50 TABLET ORAL
Status: ON HOLD
Start: 2022-01-01 | End: 2022-01-01

## 2022-01-01 RX ORDER — LANOLIN ALCOHOL/MO/W.PET/CERES
3 CREAM (GRAM) TOPICAL NIGHTLY
COMMUNITY
End: 2022-01-01 | Stop reason: HOSPADM

## 2022-01-01 RX ORDER — ONDANSETRON 2 MG/ML
4 INJECTION INTRAMUSCULAR; INTRAVENOUS EVERY 6 HOURS PRN
Status: DISCONTINUED | OUTPATIENT
Start: 2022-01-01 | End: 2022-01-01 | Stop reason: SDUPTHER

## 2022-01-01 RX ORDER — CHOLECALCIFEROL (VITAMIN D3) 125 MCG
5 CAPSULE ORAL NIGHTLY PRN
Status: DISCONTINUED | OUTPATIENT
Start: 2022-01-01 | End: 2022-01-01 | Stop reason: HOSPADM

## 2022-01-01 RX ORDER — DEXTROSE MONOHYDRATE 25 G/50ML
25 INJECTION, SOLUTION INTRAVENOUS
Status: DISCONTINUED | OUTPATIENT
Start: 2022-01-01 | End: 2022-01-01 | Stop reason: HOSPADM

## 2022-01-01 RX ORDER — GUAIFENESIN 600 MG/1
1200 TABLET, EXTENDED RELEASE ORAL EVERY 12 HOURS SCHEDULED
Status: ON HOLD
Start: 2022-01-01 | End: 2022-01-01

## 2022-01-01 RX ORDER — MIDAZOLAM HYDROCHLORIDE 1 MG/ML
0.5 INJECTION INTRAMUSCULAR; INTRAVENOUS
Status: CANCELLED | OUTPATIENT
Start: 2022-01-01

## 2022-01-01 RX ORDER — ONDANSETRON 4 MG/1
4 TABLET, FILM COATED ORAL EVERY 6 HOURS PRN
Status: DISCONTINUED | OUTPATIENT
Start: 2022-01-01 | End: 2022-01-01 | Stop reason: HOSPADM

## 2022-01-01 RX ORDER — DONEPEZIL HYDROCHLORIDE 5 MG/1
10 TABLET, FILM COATED ORAL NIGHTLY
Status: DISCONTINUED | OUTPATIENT
Start: 2022-01-01 | End: 2022-01-01 | Stop reason: HOSPADM

## 2022-01-01 RX ORDER — LABETALOL HYDROCHLORIDE 5 MG/ML
5 INJECTION, SOLUTION INTRAVENOUS
Status: DISCONTINUED | OUTPATIENT
Start: 2022-01-01 | End: 2022-01-01 | Stop reason: HOSPADM

## 2022-01-01 RX ORDER — SODIUM CHLORIDE 0.9 % (FLUSH) 0.9 %
10 SYRINGE (ML) INJECTION EVERY 12 HOURS SCHEDULED
Status: DISCONTINUED | OUTPATIENT
Start: 2022-01-01 | End: 2022-01-01 | Stop reason: HOSPADM

## 2022-01-01 RX ORDER — HYDROCODONE BITARTRATE AND ACETAMINOPHEN 7.5; 325 MG/1; MG/1
1 TABLET ORAL EVERY 12 HOURS PRN
Qty: 6 TABLET | Refills: 0 | Status: SHIPPED | OUTPATIENT
Start: 2022-01-01 | End: 2022-01-01 | Stop reason: HOSPADM

## 2022-01-01 RX ORDER — ALBUTEROL SULFATE 2.5 MG/3ML
2.5 SOLUTION RESPIRATORY (INHALATION) EVERY 6 HOURS PRN
Status: DISCONTINUED | OUTPATIENT
Start: 2022-01-01 | End: 2022-01-01

## 2022-01-01 RX ORDER — INSULIN LISPRO 100 [IU]/ML
0-7 INJECTION, SOLUTION INTRAVENOUS; SUBCUTANEOUS AS NEEDED
Status: DISCONTINUED | OUTPATIENT
Start: 2022-01-01 | End: 2022-01-01

## 2022-01-01 RX ORDER — ALBUMIN (HUMAN) 12.5 G/50ML
25 SOLUTION INTRAVENOUS AS NEEDED
Status: DISPENSED | OUTPATIENT
Start: 2022-01-01 | End: 2022-01-01

## 2022-01-01 RX ORDER — MORPHINE SULFATE 2 MG/ML
2 INJECTION, SOLUTION INTRAMUSCULAR; INTRAVENOUS
Status: CANCELLED | OUTPATIENT
Start: 2022-01-01 | End: 2022-01-01

## 2022-01-01 RX ORDER — CLOPIDOGREL BISULFATE 75 MG/1
TABLET ORAL AS NEEDED
Status: DISCONTINUED | OUTPATIENT
Start: 2022-01-01 | End: 2022-01-01 | Stop reason: HOSPADM

## 2022-01-01 RX ORDER — ONDANSETRON 4 MG/1
4 TABLET, FILM COATED ORAL EVERY 6 HOURS PRN
Status: DISCONTINUED | OUTPATIENT
Start: 2022-01-01 | End: 2022-01-01 | Stop reason: SDUPTHER

## 2022-01-01 RX ORDER — METOPROLOL TARTRATE 37.5 MG/1
37.5 TABLET, FILM COATED ORAL 2 TIMES DAILY
COMMUNITY
End: 2022-01-01 | Stop reason: HOSPADM

## 2022-01-01 RX ORDER — MIDAZOLAM HYDROCHLORIDE 1 MG/ML
INJECTION INTRAMUSCULAR; INTRAVENOUS
Status: COMPLETED | OUTPATIENT
Start: 2022-01-01 | End: 2022-01-01

## 2022-01-01 RX ORDER — HYDROCODONE BITARTRATE AND ACETAMINOPHEN 7.5; 325 MG/1; MG/1
1 TABLET ORAL 2 TIMES DAILY
Status: DISCONTINUED | OUTPATIENT
Start: 2022-01-01 | End: 2022-01-01

## 2022-01-01 RX ORDER — ACETAMINOPHEN 650 MG/1
650 SUPPOSITORY RECTAL ONCE AS NEEDED
Status: CANCELLED | OUTPATIENT
Start: 2022-01-01

## 2022-01-01 RX ORDER — NITROGLYCERIN 0.4 MG/1
0.4 TABLET SUBLINGUAL
Status: DISCONTINUED | OUTPATIENT
Start: 2022-01-01 | End: 2022-01-01 | Stop reason: HOSPADM

## 2022-01-01 RX ORDER — ASPIRIN 81 MG/1
TABLET, CHEWABLE ORAL
Status: COMPLETED
Start: 2022-01-01 | End: 2022-01-01

## 2022-01-01 RX ORDER — ASPIRIN 81 MG/1
81 TABLET ORAL DAILY
COMMUNITY
End: 2022-01-01 | Stop reason: HOSPADM

## 2022-01-01 RX ORDER — MAGNESIUM SULFATE 1 G/100ML
1 INJECTION INTRAVENOUS ONCE
Status: COMPLETED | OUTPATIENT
Start: 2022-01-01 | End: 2022-01-01

## 2022-01-01 RX ORDER — MENTHOL 40 MG/ML
1 GEL TOPICAL EVERY 6 HOURS PRN
Status: ON HOLD | COMMUNITY
End: 2022-01-01

## 2022-01-01 RX ORDER — INSULIN HUMAN 100 [IU]/ML
10 INJECTION, SUSPENSION SUBCUTANEOUS DAILY
Status: ON HOLD | COMMUNITY
End: 2022-01-01

## 2022-01-01 RX ORDER — POLYETHYLENE GLYCOL 3350 17 G/17G
17 POWDER, FOR SOLUTION ORAL 2 TIMES DAILY
Status: DISCONTINUED | OUTPATIENT
Start: 2022-01-01 | End: 2022-01-01 | Stop reason: HOSPADM

## 2022-01-01 RX ORDER — ROPIVACAINE HYDROCHLORIDE 5 MG/ML
INJECTION, SOLUTION EPIDURAL; INFILTRATION; PERINEURAL
Status: COMPLETED | OUTPATIENT
Start: 2022-01-01 | End: 2022-01-01

## 2022-01-01 RX ORDER — GLYCOPYRROLATE 1 MG/5 ML
SYRINGE (ML) INTRAVENOUS AS NEEDED
Status: DISCONTINUED | OUTPATIENT
Start: 2022-01-01 | End: 2022-01-01 | Stop reason: SURG

## 2022-01-01 RX ORDER — PANTOPRAZOLE SODIUM 40 MG/10ML
80 INJECTION, POWDER, LYOPHILIZED, FOR SOLUTION INTRAVENOUS ONCE
Status: DISCONTINUED | OUTPATIENT
Start: 2022-01-01 | End: 2022-01-01

## 2022-01-01 RX ORDER — CALCIUM CARBONATE 200(500)MG
2 TABLET,CHEWABLE ORAL 4 TIMES DAILY PRN
Status: DISCONTINUED | OUTPATIENT
Start: 2022-01-01 | End: 2022-01-01 | Stop reason: HOSPADM

## 2022-01-01 RX ORDER — CLOPIDOGREL BISULFATE 75 MG/1
75 TABLET ORAL DAILY
Status: DISCONTINUED | OUTPATIENT
Start: 2022-01-01 | End: 2022-01-01 | Stop reason: HOSPADM

## 2022-01-01 RX ORDER — HYDROCODONE BITARTRATE AND ACETAMINOPHEN 10; 325 MG/1; MG/1
1 TABLET ORAL ONCE
Status: COMPLETED | OUTPATIENT
Start: 2022-01-01 | End: 2022-01-01

## 2022-01-01 RX ORDER — EPINEPHRINE 0.1 MG/ML
SYRINGE (ML) INJECTION
Status: COMPLETED | OUTPATIENT
Start: 2022-01-01 | End: 2022-01-01

## 2022-01-01 RX ORDER — FENTANYL CITRATE 50 UG/ML
25 INJECTION, SOLUTION INTRAMUSCULAR; INTRAVENOUS
Status: DISCONTINUED | OUTPATIENT
Start: 2022-01-01 | End: 2022-01-01 | Stop reason: HOSPADM

## 2022-01-01 RX ORDER — ACETAMINOPHEN 325 MG/1
650 TABLET ORAL ONCE AS NEEDED
Status: CANCELLED | OUTPATIENT
Start: 2022-01-01

## 2022-01-01 RX ORDER — MIDODRINE HYDROCHLORIDE 5 MG/1
10 TABLET ORAL
Status: DISCONTINUED | OUTPATIENT
Start: 2022-01-01 | End: 2022-01-01 | Stop reason: SDUPTHER

## 2022-01-01 RX ORDER — SODIUM PHOSPHATE IN 0.9 % NACL 15MMOL/100
15 PLASTIC BAG, INJECTION (ML) INTRAVENOUS ONCE
Status: COMPLETED | OUTPATIENT
Start: 2022-01-01 | End: 2022-01-01

## 2022-01-01 RX ORDER — EPHEDRINE SULFATE 5 MG/ML
INJECTION INTRAVENOUS AS NEEDED
Status: DISCONTINUED | OUTPATIENT
Start: 2022-01-01 | End: 2022-01-01 | Stop reason: SURG

## 2022-01-01 RX ORDER — HEPARIN SODIUM 1000 [USP'U]/ML
INJECTION, SOLUTION INTRAVENOUS; SUBCUTANEOUS AS NEEDED
Status: DISCONTINUED | OUTPATIENT
Start: 2022-01-01 | End: 2022-01-01 | Stop reason: SURG

## 2022-01-01 RX ORDER — MIDODRINE HYDROCHLORIDE 10 MG/1
10 TABLET ORAL
COMMUNITY
End: 2022-01-01 | Stop reason: HOSPADM

## 2022-01-01 RX ORDER — LIDOCAINE HYDROCHLORIDE 10 MG/ML
0.5 INJECTION, SOLUTION EPIDURAL; INFILTRATION; INTRACAUDAL; PERINEURAL ONCE AS NEEDED
Status: DISCONTINUED | OUTPATIENT
Start: 2022-01-01 | End: 2022-01-01 | Stop reason: HOSPADM

## 2022-01-01 RX ORDER — FUROSEMIDE 10 MG/ML
80 INJECTION INTRAMUSCULAR; INTRAVENOUS ONCE
Status: COMPLETED | OUTPATIENT
Start: 2022-01-01 | End: 2022-01-01

## 2022-01-01 RX ORDER — LIDOCAINE HYDROCHLORIDE 10 MG/ML
20 INJECTION, SOLUTION INFILTRATION; PERINEURAL ONCE
Status: DISCONTINUED | OUTPATIENT
Start: 2022-01-01 | End: 2022-01-01 | Stop reason: HOSPADM

## 2022-01-01 RX ORDER — BISACODYL 10 MG
10 SUPPOSITORY, RECTAL RECTAL DAILY PRN
Status: DISCONTINUED | OUTPATIENT
Start: 2022-01-01 | End: 2022-01-01 | Stop reason: HOSPADM

## 2022-01-01 RX ORDER — HYDROCODONE BITARTRATE AND ACETAMINOPHEN 5; 325 MG/1; MG/1
1 TABLET ORAL ONCE AS NEEDED
Status: CANCELLED | OUTPATIENT
Start: 2022-01-01 | End: 2022-01-01

## 2022-01-01 RX ORDER — SODIUM CHLORIDE, SODIUM LACTATE, POTASSIUM CHLORIDE, CALCIUM CHLORIDE 600; 310; 30; 20 MG/100ML; MG/100ML; MG/100ML; MG/100ML
9 INJECTION, SOLUTION INTRAVENOUS CONTINUOUS PRN
Status: DISCONTINUED | OUTPATIENT
Start: 2022-01-01 | End: 2022-01-01 | Stop reason: HOSPADM

## 2022-01-01 RX ORDER — SODIUM CHLORIDE 9 MG/ML
250 INJECTION, SOLUTION INTRAVENOUS ONCE AS NEEDED
Status: DISCONTINUED | OUTPATIENT
Start: 2022-01-01 | End: 2022-01-01 | Stop reason: HOSPADM

## 2022-01-01 RX ORDER — ASPIRIN 81 MG/1
81 TABLET, CHEWABLE ORAL DAILY
Status: DISCONTINUED | OUTPATIENT
Start: 2022-01-01 | End: 2022-01-01

## 2022-01-01 RX ORDER — IPRATROPIUM BROMIDE AND ALBUTEROL SULFATE 2.5; .5 MG/3ML; MG/3ML
3 SOLUTION RESPIRATORY (INHALATION) EVERY 4 HOURS PRN
Qty: 360 ML
Start: 2022-01-01

## 2022-01-01 RX ORDER — ONDANSETRON 2 MG/ML
4 INJECTION INTRAMUSCULAR; INTRAVENOUS ONCE AS NEEDED
Status: DISCONTINUED | OUTPATIENT
Start: 2022-01-01 | End: 2022-01-01 | Stop reason: HOSPADM

## 2022-01-01 RX ORDER — LIDOCAINE 50 MG/G
OINTMENT TOPICAL AS NEEDED
Status: DISCONTINUED | OUTPATIENT
Start: 2022-01-01 | End: 2022-01-01 | Stop reason: HOSPADM

## 2022-01-01 RX ORDER — MIDODRINE HYDROCHLORIDE 5 MG/1
15 TABLET ORAL
Qty: 90 TABLET | Refills: 0 | Status: SHIPPED | OUTPATIENT
Start: 2022-01-01

## 2022-01-01 RX ORDER — MELATONIN
1000 DAILY
Status: DISCONTINUED | OUTPATIENT
Start: 2022-01-01 | End: 2022-01-01 | Stop reason: HOSPADM

## 2022-01-01 RX ORDER — BUDESONIDE 0.5 MG/2ML
0.5 INHALANT ORAL 2 TIMES DAILY
Status: DISCONTINUED | OUTPATIENT
Start: 2022-01-01 | End: 2022-01-01

## 2022-01-01 RX ORDER — DIPHENHYDRAMINE HYDROCHLORIDE 50 MG/ML
25 INJECTION INTRAMUSCULAR; INTRAVENOUS ONCE
Status: COMPLETED | OUTPATIENT
Start: 2022-01-01 | End: 2022-01-01

## 2022-01-01 RX ORDER — INSULIN LISPRO 100 [IU]/ML
0-14 INJECTION, SOLUTION INTRAVENOUS; SUBCUTANEOUS AS NEEDED
Status: DISCONTINUED | OUTPATIENT
Start: 2022-01-01 | End: 2022-01-01 | Stop reason: HOSPADM

## 2022-01-01 RX ORDER — DOCUSATE SODIUM 100 MG/1
100 CAPSULE, LIQUID FILLED ORAL DAILY
COMMUNITY

## 2022-01-01 RX ORDER — DEXAMETHASONE SODIUM PHOSPHATE 4 MG/ML
8 INJECTION, SOLUTION INTRA-ARTICULAR; INTRALESIONAL; INTRAMUSCULAR; INTRAVENOUS; SOFT TISSUE ONCE AS NEEDED
Status: DISCONTINUED | OUTPATIENT
Start: 2022-01-01 | End: 2022-01-01 | Stop reason: HOSPADM

## 2022-01-01 RX ORDER — HEPARIN SODIUM 1000 [USP'U]/ML
5000 INJECTION, SOLUTION INTRAVENOUS; SUBCUTANEOUS AS NEEDED
Status: DISCONTINUED | OUTPATIENT
Start: 2022-01-01 | End: 2022-01-01 | Stop reason: HOSPADM

## 2022-01-01 RX ORDER — ONDANSETRON 4 MG/1
4 TABLET, FILM COATED ORAL EVERY 8 HOURS PRN
Status: DISCONTINUED | OUTPATIENT
Start: 2022-01-01 | End: 2022-01-01 | Stop reason: HOSPADM

## 2022-01-01 RX ORDER — ACETAMINOPHEN 325 MG/1
650 TABLET ORAL ONCE AS NEEDED
Status: DISCONTINUED | OUTPATIENT
Start: 2022-01-01 | End: 2022-01-01 | Stop reason: HOSPADM

## 2022-01-01 RX ORDER — ACETAMINOPHEN 650 MG/1
650 SUPPOSITORY RECTAL ONCE AS NEEDED
Status: DISCONTINUED | OUTPATIENT
Start: 2022-01-01 | End: 2022-01-01 | Stop reason: HOSPADM

## 2022-01-01 RX ORDER — IPRATROPIUM BROMIDE AND ALBUTEROL SULFATE 2.5; .5 MG/3ML; MG/3ML
3 SOLUTION RESPIRATORY (INHALATION)
Qty: 360 ML
Start: 2022-01-01 | End: 2022-01-01 | Stop reason: SDUPTHER

## 2022-01-01 RX ORDER — HYDROCODONE BITARTRATE AND ACETAMINOPHEN 5; 325 MG/1; MG/1
1 TABLET ORAL EVERY 8 HOURS PRN
Status: DISCONTINUED | OUTPATIENT
Start: 2022-01-01 | End: 2022-01-01

## 2022-01-01 RX ORDER — ALUMINA, MAGNESIA, AND SIMETHICONE 2400; 2400; 240 MG/30ML; MG/30ML; MG/30ML
15 SUSPENSION ORAL EVERY 6 HOURS PRN
Status: DISCONTINUED | OUTPATIENT
Start: 2022-01-01 | End: 2022-01-01 | Stop reason: HOSPADM

## 2022-01-01 RX ORDER — SODIUM PHOSPHATE IN 0.9 % NACL 15MMOL/100
15 PLASTIC BAG, INJECTION (ML) INTRAVENOUS ONCE
Status: DISCONTINUED | OUTPATIENT
Start: 2022-01-01 | End: 2022-01-01

## 2022-01-01 RX ORDER — KETOROLAC TROMETHAMINE 15 MG/ML
15 INJECTION, SOLUTION INTRAMUSCULAR; INTRAVENOUS EVERY 6 HOURS PRN
Status: CANCELLED | OUTPATIENT
Start: 2022-01-01 | End: 2022-01-01

## 2022-01-01 RX ORDER — HEPARIN SODIUM 1000 [USP'U]/ML
4000 INJECTION, SOLUTION INTRAVENOUS; SUBCUTANEOUS DAILY PRN
Status: DISCONTINUED | OUTPATIENT
Start: 2022-01-01 | End: 2022-01-01 | Stop reason: HOSPADM

## 2022-01-01 RX ORDER — HEPARIN SODIUM 1000 [USP'U]/ML
INJECTION, SOLUTION INTRAVENOUS; SUBCUTANEOUS AS NEEDED
Status: DISCONTINUED | OUTPATIENT
Start: 2022-01-01 | End: 2022-01-01 | Stop reason: HOSPADM

## 2022-01-01 RX ORDER — ASPIRIN 81 MG/1
81 TABLET ORAL DAILY
Status: DISCONTINUED | OUTPATIENT
Start: 2022-01-01 | End: 2022-01-01 | Stop reason: HOSPADM

## 2022-01-01 RX ORDER — HYDROCODONE BITARTRATE AND ACETAMINOPHEN 7.5; 325 MG/1; MG/1
1 TABLET ORAL EVERY 12 HOURS PRN
Status: ON HOLD | COMMUNITY
End: 2022-01-01 | Stop reason: SDUPTHER

## 2022-01-01 RX ORDER — ALBUMIN (HUMAN) 12.5 G/50ML
25 SOLUTION INTRAVENOUS AS NEEDED
Status: CANCELLED | OUTPATIENT
Start: 2022-01-01 | End: 2022-01-01

## 2022-01-01 RX ORDER — SODIUM CHLORIDE 0.9 % (FLUSH) 0.9 %
3 SYRINGE (ML) INJECTION EVERY 12 HOURS SCHEDULED
Status: CANCELLED | OUTPATIENT
Start: 2022-01-01

## 2022-01-01 RX ORDER — IBUPROFEN 200 MG
1 TABLET ORAL NIGHTLY
Status: ON HOLD
Start: 2022-01-01 | End: 2022-01-01

## 2022-01-01 RX ORDER — ASPIRIN 81 MG/1
81 TABLET ORAL DAILY
Qty: 30 TABLET | Refills: 0 | Status: SHIPPED | OUTPATIENT
Start: 2022-01-01 | End: 2022-01-01 | Stop reason: ALTCHOICE

## 2022-01-01 RX ORDER — INSULIN LISPRO 100 [IU]/ML
0-14 INJECTION, SOLUTION INTRAVENOUS; SUBCUTANEOUS
Status: DISCONTINUED | OUTPATIENT
Start: 2022-01-01 | End: 2022-01-01 | Stop reason: HOSPADM

## 2022-01-01 RX ORDER — MINERAL OIL, PETROLATUM, PHENYLEPHRINE HCL 2.5; 140; 749 MG/G; MG/G; MG/G
1 OINTMENT TOPICAL DAILY PRN
COMMUNITY

## 2022-01-01 RX ORDER — SODIUM CHLORIDE 0.9 % (FLUSH) 0.9 %
3 SYRINGE (ML) INJECTION EVERY 12 HOURS SCHEDULED
Status: DISCONTINUED | OUTPATIENT
Start: 2022-01-01 | End: 2022-01-01 | Stop reason: HOSPADM

## 2022-01-01 RX ORDER — SODIUM CHLORIDE 9 MG/ML
20 INJECTION, SOLUTION INTRAVENOUS CONTINUOUS
Status: DISCONTINUED | OUTPATIENT
Start: 2022-01-01 | End: 2022-01-01 | Stop reason: HOSPADM

## 2022-01-01 RX ORDER — PROMETHAZINE HYDROCHLORIDE 25 MG/1
25 TABLET ORAL ONCE AS NEEDED
Status: CANCELLED | OUTPATIENT
Start: 2022-01-01

## 2022-01-01 RX ORDER — MAGNESIUM CARB/ALUMINUM HYDROX 105-160MG
296 TABLET,CHEWABLE ORAL ONCE
Status: COMPLETED | OUTPATIENT
Start: 2022-01-01 | End: 2022-01-01

## 2022-01-01 RX ORDER — PROTAMINE SULFATE 10 MG/ML
INJECTION, SOLUTION INTRAVENOUS AS NEEDED
Status: DISCONTINUED | OUTPATIENT
Start: 2022-01-01 | End: 2022-01-01 | Stop reason: SURG

## 2022-01-01 RX ORDER — LORAZEPAM 2 MG/ML
1 INJECTION INTRAMUSCULAR ONCE
Status: COMPLETED | OUTPATIENT
Start: 2022-01-01 | End: 2022-01-01

## 2022-01-01 RX ORDER — CEFDINIR 300 MG/1
300 CAPSULE ORAL 2 TIMES DAILY
Qty: 14 CAPSULE | Refills: 0 | Status: ON HOLD | OUTPATIENT
Start: 2022-01-01 | End: 2022-01-01

## 2022-01-01 RX ORDER — OMEPRAZOLE 20 MG/1
20 CAPSULE, DELAYED RELEASE ORAL DAILY
COMMUNITY

## 2022-01-01 RX ORDER — EPHEDRINE SULFATE 50 MG/ML
INJECTION INTRAVENOUS AS NEEDED
Status: DISCONTINUED | OUTPATIENT
Start: 2022-01-01 | End: 2022-01-01 | Stop reason: SURG

## 2022-01-01 RX ORDER — GUAIFENESIN 600 MG/1
600 TABLET, EXTENDED RELEASE ORAL 2 TIMES DAILY
COMMUNITY

## 2022-01-01 RX ORDER — CLOPIDOGREL BISULFATE 75 MG/1
75 TABLET ORAL DAILY
Qty: 30 TABLET | Refills: 30 | Status: SHIPPED | OUTPATIENT
Start: 2022-01-01

## 2022-01-01 RX ORDER — NICOTINE POLACRILEX 4 MG
15 LOZENGE BUCCAL
Status: CANCELLED | OUTPATIENT
Start: 2022-01-01

## 2022-01-01 RX ORDER — ALUMINA, MAGNESIA, AND SIMETHICONE 2400; 2400; 240 MG/30ML; MG/30ML; MG/30ML
15 SUSPENSION ORAL EVERY 6 HOURS PRN
Status: DISCONTINUED | OUTPATIENT
Start: 2022-01-01 | End: 2022-01-01 | Stop reason: SDUPTHER

## 2022-01-01 RX ORDER — BUDESONIDE 0.5 MG/2ML
0.5 INHALANT ORAL 2 TIMES DAILY
Status: ON HOLD
Start: 2022-01-01 | End: 2022-01-01

## 2022-01-01 RX ORDER — HEPARIN SODIUM 5000 [USP'U]/ML
5000 INJECTION, SOLUTION INTRAVENOUS; SUBCUTANEOUS EVERY 12 HOURS SCHEDULED
Status: DISCONTINUED | OUTPATIENT
Start: 2022-01-01 | End: 2022-01-01 | Stop reason: HOSPADM

## 2022-01-01 RX ORDER — LUBIPROSTONE 24 UG/1
24 CAPSULE ORAL ONCE
Status: COMPLETED | OUTPATIENT
Start: 2022-01-01 | End: 2022-01-01

## 2022-01-01 RX ORDER — FLUTICASONE PROPIONATE 50 MCG
2 SPRAY, SUSPENSION (ML) NASAL 2 TIMES DAILY
COMMUNITY

## 2022-01-01 RX ORDER — INSULIN LISPRO 100 [IU]/ML
0-7 INJECTION, SOLUTION INTRAVENOUS; SUBCUTANEOUS
Status: DISCONTINUED | OUTPATIENT
Start: 2022-01-01 | End: 2022-01-01 | Stop reason: HOSPADM

## 2022-01-01 RX ORDER — MIDODRINE HYDROCHLORIDE 5 MG/1
5 TABLET ORAL ONCE
Status: COMPLETED | OUTPATIENT
Start: 2022-01-01 | End: 2022-01-01

## 2022-01-01 RX ADMIN — HYDROCODONE BITARTRATE AND ACETAMINOPHEN 1 TABLET: 7.5; 325 TABLET ORAL at 21:58

## 2022-01-01 RX ADMIN — METOPROLOL TARTRATE 25 MG: 25 TABLET, FILM COATED ORAL at 20:40

## 2022-01-01 RX ADMIN — BARIUM SULFATE 50 ML: 400 SUSPENSION ORAL at 15:21

## 2022-01-01 RX ADMIN — ASPIRIN 81 MG CHEWABLE TABLET 81 MG: 81 TABLET CHEWABLE at 21:59

## 2022-01-01 RX ADMIN — EPINEPHRINE 1 MG: 0.1 INJECTION, SOLUTION ENDOTRACHEAL; INTRACARDIAC; INTRAVENOUS at 01:12

## 2022-01-01 RX ADMIN — GABAPENTIN 100 MG: 100 CAPSULE ORAL at 12:03

## 2022-01-01 RX ADMIN — GUAIFENESIN 600 MG: 600 TABLET, EXTENDED RELEASE ORAL at 20:51

## 2022-01-01 RX ADMIN — SERTRALINE 50 MG: 50 TABLET, FILM COATED ORAL at 08:21

## 2022-01-01 RX ADMIN — IPRATROPIUM BROMIDE AND ALBUTEROL SULFATE 3 ML: .5; 3 SOLUTION RESPIRATORY (INHALATION) at 19:05

## 2022-01-01 RX ADMIN — BUDESONIDE 0.5 MG: 0.5 INHALANT RESPIRATORY (INHALATION) at 06:46

## 2022-01-01 RX ADMIN — ATORVASTATIN CALCIUM 40 MG: 40 TABLET, FILM COATED ORAL at 20:21

## 2022-01-01 RX ADMIN — IPRATROPIUM BROMIDE AND ALBUTEROL SULFATE 3 ML: .5; 3 SOLUTION RESPIRATORY (INHALATION) at 20:20

## 2022-01-01 RX ADMIN — GABAPENTIN 100 MG: 100 CAPSULE ORAL at 09:40

## 2022-01-01 RX ADMIN — ATORVASTATIN CALCIUM 40 MG: 40 TABLET, FILM COATED ORAL at 21:24

## 2022-01-01 RX ADMIN — CARBAMIDE PEROXIDE 6.5% 10 DROP: 6.5 LIQUID AURICULAR (OTIC) at 09:31

## 2022-01-01 RX ADMIN — IPRATROPIUM BROMIDE AND ALBUTEROL SULFATE 3 ML: .5; 3 SOLUTION RESPIRATORY (INHALATION) at 19:30

## 2022-01-01 RX ADMIN — Medication 10 ML: at 09:34

## 2022-01-01 RX ADMIN — DONEPEZIL HYDROCHLORIDE 10 MG: 5 TABLET, FILM COATED ORAL at 20:56

## 2022-01-01 RX ADMIN — Medication 296 ML: at 22:17

## 2022-01-01 RX ADMIN — Medication 10 ML: at 10:37

## 2022-01-01 RX ADMIN — PANTOPRAZOLE SODIUM 40 MG: 40 TABLET, DELAYED RELEASE ORAL at 06:30

## 2022-01-01 RX ADMIN — IPRATROPIUM BROMIDE AND ALBUTEROL SULFATE 3 ML: .5; 3 SOLUTION RESPIRATORY (INHALATION) at 08:02

## 2022-01-01 RX ADMIN — PANTOPRAZOLE SODIUM 40 MG: 40 TABLET, DELAYED RELEASE ORAL at 15:58

## 2022-01-01 RX ADMIN — BUDESONIDE 0.5 MG: 0.5 INHALANT RESPIRATORY (INHALATION) at 06:38

## 2022-01-01 RX ADMIN — CALCIUM CARBONATE (ANTACID) CHEW TAB 500 MG 2 TABLET: 500 CHEW TAB at 03:51

## 2022-01-01 RX ADMIN — FUROSEMIDE 40 MG: 10 INJECTION, SOLUTION INTRAMUSCULAR; INTRAVENOUS at 00:13

## 2022-01-01 RX ADMIN — GUAIFENESIN 600 MG: 600 TABLET, EXTENDED RELEASE ORAL at 09:14

## 2022-01-01 RX ADMIN — CYANOCOBALAMIN TAB 1000 MCG 1000 MCG: 1000 TAB at 10:13

## 2022-01-01 RX ADMIN — GUAIFENESIN 600 MG: 600 TABLET, EXTENDED RELEASE ORAL at 08:56

## 2022-01-01 RX ADMIN — IPRATROPIUM BROMIDE AND ALBUTEROL SULFATE 3 ML: .5; 3 SOLUTION RESPIRATORY (INHALATION) at 15:10

## 2022-01-01 RX ADMIN — PANTOPRAZOLE SODIUM 40 MG: 40 TABLET, DELAYED RELEASE ORAL at 07:56

## 2022-01-01 RX ADMIN — MIDODRINE HYDROCHLORIDE 10 MG: 5 TABLET ORAL at 15:11

## 2022-01-01 RX ADMIN — PANTOPRAZOLE SODIUM 40 MG: 40 TABLET, DELAYED RELEASE ORAL at 09:46

## 2022-01-01 RX ADMIN — BUDESONIDE 0.5 MG: 0.5 INHALANT RESPIRATORY (INHALATION) at 20:14

## 2022-01-01 RX ADMIN — LEVOTHYROXINE SODIUM 50 MCG: 0.05 TABLET ORAL at 07:29

## 2022-01-01 RX ADMIN — Medication 10 ML: at 20:38

## 2022-01-01 RX ADMIN — GUAIFENESIN 1200 MG: 600 TABLET, EXTENDED RELEASE ORAL at 21:42

## 2022-01-01 RX ADMIN — ASPIRIN 81 MG: 81 TABLET, COATED ORAL at 08:48

## 2022-01-01 RX ADMIN — PRIMIDONE 25 MG: 50 TABLET ORAL at 17:05

## 2022-01-01 RX ADMIN — INSULIN LISPRO 3 UNITS: 100 INJECTION, SOLUTION INTRAVENOUS; SUBCUTANEOUS at 09:47

## 2022-01-01 RX ADMIN — ACETAMINOPHEN 650 MG: 325 TABLET ORAL at 06:04

## 2022-01-01 RX ADMIN — SORBITOL SOLUTION (BULK) 50 ML: 70 SOLUTION at 08:15

## 2022-01-01 RX ADMIN — GUAIFENESIN 1200 MG: 600 TABLET, EXTENDED RELEASE ORAL at 21:51

## 2022-01-01 RX ADMIN — IPRATROPIUM BROMIDE AND ALBUTEROL SULFATE 3 ML: .5; 3 SOLUTION RESPIRATORY (INHALATION) at 06:35

## 2022-01-01 RX ADMIN — POLYETHYLENE GLYCOL 3350 17 G: 17 POWDER, FOR SOLUTION ORAL at 08:33

## 2022-01-01 RX ADMIN — AMIODARONE HYDROCHLORIDE 200 MG: 200 TABLET ORAL at 09:41

## 2022-01-01 RX ADMIN — MIDODRINE HYDROCHLORIDE 10 MG: 5 TABLET ORAL at 17:01

## 2022-01-01 RX ADMIN — DOCUSATE SODIUM 100 MG: 100 CAPSULE, LIQUID FILLED ORAL at 09:46

## 2022-01-01 RX ADMIN — ONDANSETRON HYDROCHLORIDE 4 MG: 4 TABLET, FILM COATED ORAL at 05:35

## 2022-01-01 RX ADMIN — INSULIN LISPRO 3 UNITS: 100 INJECTION, SOLUTION INTRAVENOUS; SUBCUTANEOUS at 12:08

## 2022-01-01 RX ADMIN — ATORVASTATIN CALCIUM 40 MG: 40 TABLET, FILM COATED ORAL at 12:03

## 2022-01-01 RX ADMIN — PRIMIDONE 25 MG: 50 TABLET ORAL at 08:21

## 2022-01-01 RX ADMIN — SERTRALINE 50 MG: 50 TABLET, FILM COATED ORAL at 15:10

## 2022-01-01 RX ADMIN — ONDANSETRON 4 MG: 2 INJECTION INTRAMUSCULAR; INTRAVENOUS at 19:12

## 2022-01-01 RX ADMIN — ATORVASTATIN CALCIUM 40 MG: 40 TABLET, FILM COATED ORAL at 20:40

## 2022-01-01 RX ADMIN — MIDODRINE HYDROCHLORIDE 10 MG: 5 TABLET ORAL at 07:27

## 2022-01-01 RX ADMIN — MAGNESIUM HYDROXIDE,ALUMINUM HYDROXICE,SIMETHICONE: 240; 2400; 2400 SUSPENSION ORAL at 13:27

## 2022-01-01 RX ADMIN — METOPROLOL TARTRATE 25 MG: 25 TABLET, FILM COATED ORAL at 20:18

## 2022-01-01 RX ADMIN — MUPIROCIN 1 APPLICATION: 20 OINTMENT TOPICAL at 13:16

## 2022-01-01 RX ADMIN — ATORVASTATIN CALCIUM 40 MG: 40 TABLET, FILM COATED ORAL at 20:54

## 2022-01-01 RX ADMIN — MEPIVACAINE HYDROCHLORIDE 15 ML: 10 INJECTION, SOLUTION EPIDURAL; INFILTRATION at 10:07

## 2022-01-01 RX ADMIN — INSULIN LISPRO 5 UNITS: 100 INJECTION, SOLUTION INTRAVENOUS; SUBCUTANEOUS at 13:06

## 2022-01-01 RX ADMIN — AMIODARONE HYDROCHLORIDE 200 MG: 200 TABLET ORAL at 09:03

## 2022-01-01 RX ADMIN — METOPROLOL TARTRATE 25 MG: 25 TABLET, FILM COATED ORAL at 20:56

## 2022-01-01 RX ADMIN — MIDODRINE HYDROCHLORIDE 10 MG: 5 TABLET ORAL at 17:50

## 2022-01-01 RX ADMIN — MIDODRINE HYDROCHLORIDE 10 MG: 5 TABLET ORAL at 09:13

## 2022-01-01 RX ADMIN — CYANOCOBALAMIN TAB 1000 MCG 1000 MCG: 1000 TAB at 09:40

## 2022-01-01 RX ADMIN — METOPROLOL TARTRATE 37.5 MG: 25 TABLET, FILM COATED ORAL at 21:58

## 2022-01-01 RX ADMIN — INSULIN LISPRO 2 UNITS: 100 INJECTION, SOLUTION INTRAVENOUS; SUBCUTANEOUS at 17:38

## 2022-01-01 RX ADMIN — DONEPEZIL HYDROCHLORIDE 10 MG: 5 TABLET, FILM COATED ORAL at 22:00

## 2022-01-01 RX ADMIN — IPRATROPIUM BROMIDE AND ALBUTEROL SULFATE 3 ML: .5; 3 SOLUTION RESPIRATORY (INHALATION) at 21:08

## 2022-01-01 RX ADMIN — PANTOPRAZOLE SODIUM 40 MG: 40 TABLET, DELAYED RELEASE ORAL at 10:13

## 2022-01-01 RX ADMIN — MIDODRINE HYDROCHLORIDE 15 MG: 5 TABLET ORAL at 17:33

## 2022-01-01 RX ADMIN — FUROSEMIDE 80 MG: 10 INJECTION, SOLUTION INTRAMUSCULAR; INTRAVENOUS at 00:02

## 2022-01-01 RX ADMIN — SERTRALINE 50 MG: 50 TABLET, FILM COATED ORAL at 09:42

## 2022-01-01 RX ADMIN — BUDESONIDE 0.5 MG: 0.5 INHALANT RESPIRATORY (INHALATION) at 08:42

## 2022-01-01 RX ADMIN — APIXABAN 5 MG: 5 TABLET, FILM COATED ORAL at 21:24

## 2022-01-01 RX ADMIN — APIXABAN 5 MG: 5 TABLET, FILM COATED ORAL at 22:01

## 2022-01-01 RX ADMIN — SERTRALINE 50 MG: 50 TABLET, FILM COATED ORAL at 09:40

## 2022-01-01 RX ADMIN — IPRATROPIUM BROMIDE AND ALBUTEROL SULFATE 3 ML: .5; 3 SOLUTION RESPIRATORY (INHALATION) at 06:54

## 2022-01-01 RX ADMIN — IPRATROPIUM BROMIDE AND ALBUTEROL SULFATE 3 ML: .5; 3 SOLUTION RESPIRATORY (INHALATION) at 14:53

## 2022-01-01 RX ADMIN — DONEPEZIL HYDROCHLORIDE 10 MG: 5 TABLET, FILM COATED ORAL at 21:10

## 2022-01-01 RX ADMIN — IPRATROPIUM BROMIDE AND ALBUTEROL SULFATE 3 ML: .5; 3 SOLUTION RESPIRATORY (INHALATION) at 11:09

## 2022-01-01 RX ADMIN — MIDODRINE HYDROCHLORIDE 10 MG: 5 TABLET ORAL at 09:24

## 2022-01-01 RX ADMIN — PANTOPRAZOLE SODIUM 40 MG: 40 TABLET, DELAYED RELEASE ORAL at 09:03

## 2022-01-01 RX ADMIN — GUAIFENESIN 600 MG: 600 TABLET, EXTENDED RELEASE ORAL at 23:37

## 2022-01-01 RX ADMIN — ONDANSETRON 4 MG: 2 INJECTION INTRAMUSCULAR; INTRAVENOUS at 18:19

## 2022-01-01 RX ADMIN — METOPROLOL TARTRATE 25 MG: 25 TABLET, FILM COATED ORAL at 09:33

## 2022-01-01 RX ADMIN — ROPIVACAINE HYDROCHLORIDE 15 ML: 5 INJECTION EPIDURAL; INFILTRATION; PERINEURAL at 10:07

## 2022-01-01 RX ADMIN — IPRATROPIUM BROMIDE AND ALBUTEROL SULFATE 3 ML: .5; 3 SOLUTION RESPIRATORY (INHALATION) at 15:08

## 2022-01-01 RX ADMIN — ATORVASTATIN CALCIUM 40 MG: 40 TABLET, FILM COATED ORAL at 20:28

## 2022-01-01 RX ADMIN — Medication 10 ML: at 20:01

## 2022-01-01 RX ADMIN — MIDODRINE HYDROCHLORIDE 10 MG: 5 TABLET ORAL at 18:09

## 2022-01-01 RX ADMIN — EPOETIN ALFA-EPBX 20000 UNITS: 20000 INJECTION, SOLUTION INTRAVENOUS; SUBCUTANEOUS at 10:56

## 2022-01-01 RX ADMIN — GABAPENTIN 100 MG: 100 CAPSULE ORAL at 11:13

## 2022-01-01 RX ADMIN — ATORVASTATIN CALCIUM 40 MG: 40 TABLET, FILM COATED ORAL at 21:38

## 2022-01-01 RX ADMIN — BUDESONIDE 0.5 MG: 0.5 INHALANT RESPIRATORY (INHALATION) at 18:47

## 2022-01-01 RX ADMIN — METOPROLOL TARTRATE 37.5 MG: 25 TABLET, FILM COATED ORAL at 20:00

## 2022-01-01 RX ADMIN — MIDODRINE HYDROCHLORIDE 10 MG: 5 TABLET ORAL at 18:22

## 2022-01-01 RX ADMIN — IPRATROPIUM BROMIDE AND ALBUTEROL SULFATE 3 ML: .5; 3 SOLUTION RESPIRATORY (INHALATION) at 07:31

## 2022-01-01 RX ADMIN — APIXABAN 5 MG: 5 TABLET, FILM COATED ORAL at 08:03

## 2022-01-01 RX ADMIN — MIDODRINE HYDROCHLORIDE 15 MG: 5 TABLET ORAL at 12:40

## 2022-01-01 RX ADMIN — METOPROLOL TARTRATE 37.5 MG: 25 TABLET, FILM COATED ORAL at 08:19

## 2022-01-01 RX ADMIN — MIDODRINE HYDROCHLORIDE 15 MG: 5 TABLET ORAL at 12:09

## 2022-01-01 RX ADMIN — EPHEDRINE SULFATE 5 MG: 50 INJECTION INTRAVENOUS at 13:03

## 2022-01-01 RX ADMIN — BUDESONIDE 0.5 MG: 0.5 INHALANT RESPIRATORY (INHALATION) at 19:45

## 2022-01-01 RX ADMIN — IPRATROPIUM BROMIDE 0.5 MG: 0.5 SOLUTION RESPIRATORY (INHALATION) at 07:45

## 2022-01-01 RX ADMIN — METOPROLOL TARTRATE 37.5 MG: 25 TABLET, FILM COATED ORAL at 21:28

## 2022-01-01 RX ADMIN — METOPROLOL TARTRATE 37.5 MG: 25 TABLET, FILM COATED ORAL at 21:48

## 2022-01-01 RX ADMIN — IPRATROPIUM BROMIDE AND ALBUTEROL SULFATE 3 ML: .5; 3 SOLUTION RESPIRATORY (INHALATION) at 10:50

## 2022-01-01 RX ADMIN — DONEPEZIL HYDROCHLORIDE 10 MG: 5 TABLET, FILM COATED ORAL at 21:57

## 2022-01-01 RX ADMIN — BUDESONIDE 0.5 MG: 0.5 INHALANT RESPIRATORY (INHALATION) at 18:54

## 2022-01-01 RX ADMIN — Medication 10 ML: at 08:18

## 2022-01-01 RX ADMIN — MIDODRINE HYDROCHLORIDE 15 MG: 5 TABLET ORAL at 08:48

## 2022-01-01 RX ADMIN — MORPHINE SULFATE 2 MG: 2 INJECTION, SOLUTION INTRAMUSCULAR; INTRAVENOUS at 17:19

## 2022-01-01 RX ADMIN — MIDODRINE HYDROCHLORIDE 10 MG: 5 TABLET ORAL at 06:56

## 2022-01-01 RX ADMIN — Medication 10 ML: at 08:22

## 2022-01-01 RX ADMIN — BUDESONIDE 0.5 MG: 0.5 INHALANT RESPIRATORY (INHALATION) at 21:03

## 2022-01-01 RX ADMIN — Medication 10 ML: at 09:18

## 2022-01-01 RX ADMIN — MIDODRINE HYDROCHLORIDE 15 MG: 5 TABLET ORAL at 12:08

## 2022-01-01 RX ADMIN — IPRATROPIUM BROMIDE AND ALBUTEROL SULFATE 3 ML: .5; 3 SOLUTION RESPIRATORY (INHALATION) at 19:00

## 2022-01-01 RX ADMIN — DONEPEZIL HYDROCHLORIDE 10 MG: 5 TABLET, FILM COATED ORAL at 22:12

## 2022-01-01 RX ADMIN — INSULIN LISPRO 5 UNITS: 100 INJECTION, SOLUTION INTRAVENOUS; SUBCUTANEOUS at 13:13

## 2022-01-01 RX ADMIN — BUDESONIDE 0.5 MG: 0.5 INHALANT RESPIRATORY (INHALATION) at 06:42

## 2022-01-01 RX ADMIN — ASPIRIN 81 MG CHEWABLE TABLET 81 MG: 81 TABLET CHEWABLE at 10:22

## 2022-01-01 RX ADMIN — IPRATROPIUM BROMIDE AND ALBUTEROL SULFATE 3 ML: .5; 3 SOLUTION RESPIRATORY (INHALATION) at 06:41

## 2022-01-01 RX ADMIN — LEVOTHYROXINE SODIUM 50 MCG: 0.05 TABLET ORAL at 06:30

## 2022-01-01 RX ADMIN — MIDODRINE HYDROCHLORIDE 10 MG: 5 TABLET ORAL at 11:36

## 2022-01-01 RX ADMIN — SERTRALINE 50 MG: 50 TABLET, FILM COATED ORAL at 09:03

## 2022-01-01 RX ADMIN — ATORVASTATIN CALCIUM 40 MG: 40 TABLET, FILM COATED ORAL at 22:00

## 2022-01-01 RX ADMIN — HEPARIN SODIUM 5000 UNITS: 1000 INJECTION INTRAVENOUS; SUBCUTANEOUS at 10:23

## 2022-01-01 RX ADMIN — ATORVASTATIN CALCIUM 40 MG: 40 TABLET, FILM COATED ORAL at 21:08

## 2022-01-01 RX ADMIN — PANTOPRAZOLE SODIUM 40 MG: 40 TABLET, DELAYED RELEASE ORAL at 15:10

## 2022-01-01 RX ADMIN — IPRATROPIUM BROMIDE AND ALBUTEROL SULFATE 3 ML: .5; 3 SOLUTION RESPIRATORY (INHALATION) at 06:39

## 2022-01-01 RX ADMIN — ATORVASTATIN CALCIUM 40 MG: 40 TABLET, FILM COATED ORAL at 23:36

## 2022-01-01 RX ADMIN — ATORVASTATIN CALCIUM 40 MG: 40 TABLET, FILM COATED ORAL at 10:13

## 2022-01-01 RX ADMIN — ONDANSETRON HYDROCHLORIDE 4 MG: 4 TABLET, FILM COATED ORAL at 01:48

## 2022-01-01 RX ADMIN — PANTOPRAZOLE SODIUM 40 MG: 40 TABLET, DELAYED RELEASE ORAL at 08:19

## 2022-01-01 RX ADMIN — MIDODRINE HYDROCHLORIDE 10 MG: 5 TABLET ORAL at 17:37

## 2022-01-01 RX ADMIN — IPRATROPIUM BROMIDE AND ALBUTEROL SULFATE 3 ML: .5; 3 SOLUTION RESPIRATORY (INHALATION) at 07:39

## 2022-01-01 RX ADMIN — Medication 10 ML: at 20:27

## 2022-01-01 RX ADMIN — BUDESONIDE 0.5 MG: 0.5 INHALANT RESPIRATORY (INHALATION) at 20:36

## 2022-01-01 RX ADMIN — ACETAMINOPHEN 650 MG: 325 TABLET ORAL at 18:43

## 2022-01-01 RX ADMIN — MIDODRINE HYDROCHLORIDE 15 MG: 5 TABLET ORAL at 12:49

## 2022-01-01 RX ADMIN — APIXABAN 5 MG: 5 TABLET, FILM COATED ORAL at 08:32

## 2022-01-01 RX ADMIN — SERTRALINE 50 MG: 50 TABLET, FILM COATED ORAL at 09:34

## 2022-01-01 RX ADMIN — METOPROLOL TARTRATE 37.5 MG: 25 TABLET, FILM COATED ORAL at 21:38

## 2022-01-01 RX ADMIN — ENOXAPARIN SODIUM 100 MG: 100 INJECTION SUBCUTANEOUS at 13:37

## 2022-01-01 RX ADMIN — ACETAMINOPHEN 650 MG: 325 TABLET ORAL at 17:43

## 2022-01-01 RX ADMIN — MIDODRINE HYDROCHLORIDE 15 MG: 5 TABLET ORAL at 09:33

## 2022-01-01 RX ADMIN — METHYLPREDNISOLONE SODIUM SUCCINATE 125 MG: 125 INJECTION, POWDER, FOR SOLUTION INTRAMUSCULAR; INTRAVENOUS at 20:27

## 2022-01-01 RX ADMIN — BUDESONIDE 0.5 MG: 0.5 INHALANT RESPIRATORY (INHALATION) at 08:07

## 2022-01-01 RX ADMIN — Medication 1000 UNITS: at 12:36

## 2022-01-01 RX ADMIN — METOPROLOL TARTRATE 37.5 MG: 25 TABLET, FILM COATED ORAL at 09:03

## 2022-01-01 RX ADMIN — CEFTRIAXONE 1 G: 1 INJECTION, POWDER, FOR SOLUTION INTRAMUSCULAR; INTRAVENOUS at 04:43

## 2022-01-01 RX ADMIN — BARIUM SULFATE 50 ML: 400 SUSPENSION ORAL at 14:42

## 2022-01-01 RX ADMIN — DOCUSATE SODIUM 100 MG: 100 CAPSULE, LIQUID FILLED ORAL at 12:03

## 2022-01-01 RX ADMIN — DOCUSATE SODIUM 100 MG: 100 CAPSULE, LIQUID FILLED ORAL at 09:13

## 2022-01-01 RX ADMIN — INSULIN LISPRO 3 UNITS: 100 INJECTION, SOLUTION INTRAVENOUS; SUBCUTANEOUS at 21:00

## 2022-01-01 RX ADMIN — MIDODRINE HYDROCHLORIDE 10 MG: 5 TABLET ORAL at 08:56

## 2022-01-01 RX ADMIN — INSULIN LISPRO 3 UNITS: 100 INJECTION, SOLUTION INTRAVENOUS; SUBCUTANEOUS at 20:43

## 2022-01-01 RX ADMIN — LEVOTHYROXINE SODIUM 50 MCG: 0.05 TABLET ORAL at 05:57

## 2022-01-01 RX ADMIN — IPRATROPIUM BROMIDE AND ALBUTEROL SULFATE 3 ML: .5; 3 SOLUTION RESPIRATORY (INHALATION) at 11:42

## 2022-01-01 RX ADMIN — IPRATROPIUM BROMIDE AND ALBUTEROL SULFATE 3 ML: .5; 3 SOLUTION RESPIRATORY (INHALATION) at 20:16

## 2022-01-01 RX ADMIN — PANTOPRAZOLE SODIUM 40 MG: 40 INJECTION, POWDER, FOR SOLUTION INTRAVENOUS at 20:12

## 2022-01-01 RX ADMIN — MIDODRINE HYDROCHLORIDE 15 MG: 5 TABLET ORAL at 17:57

## 2022-01-01 RX ADMIN — APIXABAN 5 MG: 5 TABLET, FILM COATED ORAL at 21:09

## 2022-01-01 RX ADMIN — IPRATROPIUM BROMIDE AND ALBUTEROL SULFATE 3 ML: .5; 3 SOLUTION RESPIRATORY (INHALATION) at 17:50

## 2022-01-01 RX ADMIN — DONEPEZIL HYDROCHLORIDE 10 MG: 5 TABLET, FILM COATED ORAL at 21:37

## 2022-01-01 RX ADMIN — IPRATROPIUM BROMIDE AND ALBUTEROL SULFATE 3 ML: .5; 3 SOLUTION RESPIRATORY (INHALATION) at 11:50

## 2022-01-01 RX ADMIN — PANTOPRAZOLE SODIUM 40 MG: 40 TABLET, DELAYED RELEASE ORAL at 05:48

## 2022-01-01 RX ADMIN — IPRATROPIUM BROMIDE 0.5 MG: 0.5 SOLUTION RESPIRATORY (INHALATION) at 09:19

## 2022-01-01 RX ADMIN — GABAPENTIN 100 MG: 100 CAPSULE ORAL at 21:48

## 2022-01-01 RX ADMIN — AMIODARONE HYDROCHLORIDE 200 MG: 200 TABLET ORAL at 08:17

## 2022-01-01 RX ADMIN — ONDANSETRON 4 MG: 2 INJECTION INTRAMUSCULAR; INTRAVENOUS at 17:19

## 2022-01-01 RX ADMIN — GABAPENTIN 100 MG: 100 CAPSULE ORAL at 08:32

## 2022-01-01 RX ADMIN — GUAIFENESIN 1200 MG: 600 TABLET, EXTENDED RELEASE ORAL at 11:13

## 2022-01-01 RX ADMIN — IPRATROPIUM BROMIDE AND ALBUTEROL SULFATE 3 ML: .5; 3 SOLUTION RESPIRATORY (INHALATION) at 06:29

## 2022-01-01 RX ADMIN — HEPARIN SODIUM 5000 UNITS: 5000 INJECTION, SOLUTION INTRAVENOUS; SUBCUTANEOUS at 20:01

## 2022-01-01 RX ADMIN — INSULIN LISPRO 5 UNITS: 100 INJECTION, SOLUTION INTRAVENOUS; SUBCUTANEOUS at 12:05

## 2022-01-01 RX ADMIN — ONDANSETRON 4 MG: 2 INJECTION INTRAMUSCULAR; INTRAVENOUS at 08:32

## 2022-01-01 RX ADMIN — IPRATROPIUM BROMIDE AND ALBUTEROL SULFATE 3 ML: .5; 3 SOLUTION RESPIRATORY (INHALATION) at 15:21

## 2022-01-01 RX ADMIN — DONEPEZIL HYDROCHLORIDE 10 MG: 5 TABLET, FILM COATED ORAL at 23:11

## 2022-01-01 RX ADMIN — ENOXAPARIN SODIUM 100 MG: 100 INJECTION SUBCUTANEOUS at 12:17

## 2022-01-01 RX ADMIN — METOPROLOL TARTRATE 25 MG: 25 TABLET, FILM COATED ORAL at 20:46

## 2022-01-01 RX ADMIN — Medication 80 MCG: at 13:54

## 2022-01-01 RX ADMIN — GUAIFENESIN 600 MG: 600 TABLET, EXTENDED RELEASE ORAL at 08:29

## 2022-01-01 RX ADMIN — METOPROLOL TARTRATE 37.5 MG: 25 TABLET, FILM COATED ORAL at 20:01

## 2022-01-01 RX ADMIN — Medication 10 ML: at 09:26

## 2022-01-01 RX ADMIN — METOPROLOL TARTRATE 25 MG: 25 TABLET, FILM COATED ORAL at 21:13

## 2022-01-01 RX ADMIN — HEPARIN SODIUM 4000 UNITS: 1000 INJECTION INTRAVENOUS; SUBCUTANEOUS at 12:13

## 2022-01-01 RX ADMIN — SERTRALINE 50 MG: 50 TABLET, FILM COATED ORAL at 09:22

## 2022-01-01 RX ADMIN — IPRATROPIUM BROMIDE AND ALBUTEROL SULFATE 3 ML: .5; 3 SOLUTION RESPIRATORY (INHALATION) at 15:46

## 2022-01-01 RX ADMIN — MIDODRINE HYDROCHLORIDE 10 MG: 5 TABLET ORAL at 09:06

## 2022-01-01 RX ADMIN — CEFTRIAXONE 2 G: 2 INJECTION, POWDER, FOR SOLUTION INTRAMUSCULAR; INTRAVENOUS at 04:13

## 2022-01-01 RX ADMIN — Medication 3 ML: at 10:05

## 2022-01-01 RX ADMIN — CEFTRIAXONE 1 G: 1 INJECTION, POWDER, FOR SOLUTION INTRAMUSCULAR; INTRAVENOUS at 09:03

## 2022-01-01 RX ADMIN — Medication 10 ML: at 09:58

## 2022-01-01 RX ADMIN — BUDESONIDE 0.5 MG: 0.5 INHALANT RESPIRATORY (INHALATION) at 06:44

## 2022-01-01 RX ADMIN — LEVOTHYROXINE SODIUM 50 MCG: 0.05 TABLET ORAL at 05:07

## 2022-01-01 RX ADMIN — Medication 10 ML: at 12:05

## 2022-01-01 RX ADMIN — BUDESONIDE 0.5 MG: 0.5 INHALANT RESPIRATORY (INHALATION) at 06:50

## 2022-01-01 RX ADMIN — CYANOCOBALAMIN TAB 1000 MCG 1000 MCG: 1000 TAB at 11:12

## 2022-01-01 RX ADMIN — DONEPEZIL HYDROCHLORIDE 10 MG: 5 TABLET, FILM COATED ORAL at 20:40

## 2022-01-01 RX ADMIN — MIDODRINE HYDROCHLORIDE 15 MG: 5 TABLET ORAL at 17:44

## 2022-01-01 RX ADMIN — PANTOPRAZOLE SODIUM 40 MG: 40 INJECTION, POWDER, FOR SOLUTION INTRAVENOUS at 08:48

## 2022-01-01 RX ADMIN — IPRATROPIUM BROMIDE AND ALBUTEROL SULFATE 3 ML: .5; 3 SOLUTION RESPIRATORY (INHALATION) at 06:49

## 2022-01-01 RX ADMIN — INSULIN HUMAN 10 UNITS: 100 INJECTION, SUSPENSION SUBCUTANEOUS at 08:22

## 2022-01-01 RX ADMIN — MIDODRINE HYDROCHLORIDE 10 MG: 5 TABLET ORAL at 16:57

## 2022-01-01 RX ADMIN — Medication 10 ML: at 20:51

## 2022-01-01 RX ADMIN — HYDROCODONE BITARTRATE AND ACETAMINOPHEN 1 TABLET: 7.5; 325 TABLET ORAL at 08:22

## 2022-01-01 RX ADMIN — LEVOTHYROXINE SODIUM 50 MCG: 0.05 TABLET ORAL at 05:25

## 2022-01-01 RX ADMIN — APIXABAN 5 MG: 5 TABLET, FILM COATED ORAL at 20:54

## 2022-01-01 RX ADMIN — ATORVASTATIN CALCIUM 40 MG: 40 TABLET, FILM COATED ORAL at 20:13

## 2022-01-01 RX ADMIN — INSULIN LISPRO 2 UNITS: 100 INJECTION, SOLUTION INTRAVENOUS; SUBCUTANEOUS at 16:41

## 2022-01-01 RX ADMIN — EPHEDRINE SULFATE 10 MG: 5 INJECTION INTRAVENOUS at 14:10

## 2022-01-01 RX ADMIN — MORPHINE SULFATE 2 MG: 2 INJECTION, SOLUTION INTRAMUSCULAR; INTRAVENOUS at 03:47

## 2022-01-01 RX ADMIN — Medication 10 ML: at 08:54

## 2022-01-01 RX ADMIN — MIDODRINE HYDROCHLORIDE 15 MG: 5 TABLET ORAL at 12:15

## 2022-01-01 RX ADMIN — PRIMIDONE 25 MG: 50 TABLET ORAL at 08:59

## 2022-01-01 RX ADMIN — SERTRALINE 50 MG: 50 TABLET, FILM COATED ORAL at 09:25

## 2022-01-01 RX ADMIN — IPRATROPIUM BROMIDE AND ALBUTEROL SULFATE 3 ML: .5; 3 SOLUTION RESPIRATORY (INHALATION) at 19:56

## 2022-01-01 RX ADMIN — GUAIFENESIN 600 MG: 600 TABLET, EXTENDED RELEASE ORAL at 21:24

## 2022-01-01 RX ADMIN — METOPROLOL TARTRATE 37.5 MG: 25 TABLET, FILM COATED ORAL at 21:57

## 2022-01-01 RX ADMIN — DEXTROSE MONOHYDRATE 25 G: 25 INJECTION, SOLUTION INTRAVENOUS at 07:29

## 2022-01-01 RX ADMIN — CEFTRIAXONE 1 G: 10 INJECTION, POWDER, FOR SOLUTION INTRAVENOUS at 11:13

## 2022-01-01 RX ADMIN — METOPROLOL TARTRATE 25 MG: 25 TABLET, FILM COATED ORAL at 21:57

## 2022-01-01 RX ADMIN — HEPARIN SODIUM 4000 UNITS: 1000 INJECTION INTRAVENOUS; SUBCUTANEOUS at 14:27

## 2022-01-01 RX ADMIN — IPRATROPIUM BROMIDE AND ALBUTEROL SULFATE 3 ML: .5; 3 SOLUTION RESPIRATORY (INHALATION) at 02:00

## 2022-01-01 RX ADMIN — Medication 10 ML: at 22:17

## 2022-01-01 RX ADMIN — ASPIRIN 81 MG: 81 TABLET, COATED ORAL at 08:32

## 2022-01-01 RX ADMIN — HYDROCODONE BITARTRATE AND ACETAMINOPHEN 1 TABLET: 7.5; 325 TABLET ORAL at 12:03

## 2022-01-01 RX ADMIN — PROPOFOL 50 MG: 10 INJECTION, EMULSION INTRAVENOUS at 13:52

## 2022-01-01 RX ADMIN — DONEPEZIL HYDROCHLORIDE 10 MG: 5 TABLET, FILM COATED ORAL at 20:18

## 2022-01-01 RX ADMIN — PRIMIDONE 25 MG: 50 TABLET ORAL at 09:23

## 2022-01-01 RX ADMIN — METHYLPREDNISOLONE SODIUM SUCCINATE 125 MG: 125 INJECTION, POWDER, FOR SOLUTION INTRAMUSCULAR; INTRAVENOUS at 22:04

## 2022-01-01 RX ADMIN — Medication 10 ML: at 21:57

## 2022-01-01 RX ADMIN — DOCUSATE SODIUM 100 MG: 100 CAPSULE, LIQUID FILLED ORAL at 08:58

## 2022-01-01 RX ADMIN — INSULIN LISPRO 3 UNITS: 100 INJECTION, SOLUTION INTRAVENOUS; SUBCUTANEOUS at 17:54

## 2022-01-01 RX ADMIN — ACETAMINOPHEN 650 MG: 325 TABLET ORAL at 11:16

## 2022-01-01 RX ADMIN — DOCUSATE SODIUM 100 MG: 100 CAPSULE, LIQUID FILLED ORAL at 09:26

## 2022-01-01 RX ADMIN — BUDESONIDE 0.5 MG: 0.5 INHALANT RESPIRATORY (INHALATION) at 18:51

## 2022-01-01 RX ADMIN — APIXABAN 5 MG: 5 TABLET, FILM COATED ORAL at 20:01

## 2022-01-01 RX ADMIN — FUROSEMIDE 80 MG: 10 INJECTION, SOLUTION INTRAMUSCULAR; INTRAVENOUS at 12:18

## 2022-01-01 RX ADMIN — APIXABAN 5 MG: 5 TABLET, FILM COATED ORAL at 08:48

## 2022-01-01 RX ADMIN — GUAIFENESIN 1200 MG: 600 TABLET, EXTENDED RELEASE ORAL at 17:16

## 2022-01-01 RX ADMIN — AMIODARONE HYDROCHLORIDE 200 MG: 200 TABLET ORAL at 10:52

## 2022-01-01 RX ADMIN — METOPROLOL TARTRATE 37.5 MG: 25 TABLET, FILM COATED ORAL at 21:08

## 2022-01-01 RX ADMIN — Medication 10 ML: at 10:05

## 2022-01-01 RX ADMIN — DOCUSATE SODIUM 100 MG: 100 CAPSULE, LIQUID FILLED ORAL at 15:59

## 2022-01-01 RX ADMIN — MIDODRINE HYDROCHLORIDE 10 MG: 5 TABLET ORAL at 17:38

## 2022-01-01 RX ADMIN — Medication 10 ML: at 20:19

## 2022-01-01 RX ADMIN — ATORVASTATIN CALCIUM 40 MG: 40 TABLET, FILM COATED ORAL at 20:01

## 2022-01-01 RX ADMIN — MIDODRINE HYDROCHLORIDE 10 MG: 5 TABLET ORAL at 07:56

## 2022-01-01 RX ADMIN — IPRATROPIUM BROMIDE AND ALBUTEROL SULFATE 3 ML: .5; 3 SOLUTION RESPIRATORY (INHALATION) at 06:25

## 2022-01-01 RX ADMIN — ATORVASTATIN CALCIUM 40 MG: 40 TABLET, FILM COATED ORAL at 08:21

## 2022-01-01 RX ADMIN — OXYCODONE 5 MG: 5 TABLET ORAL at 21:05

## 2022-01-01 RX ADMIN — BUDESONIDE 0.5 MG: 0.5 INHALANT RESPIRATORY (INHALATION) at 07:23

## 2022-01-01 RX ADMIN — AMIODARONE HYDROCHLORIDE 200 MG: 200 TABLET ORAL at 12:16

## 2022-01-01 RX ADMIN — ACETAMINOPHEN 650 MG: 325 TABLET, FILM COATED ORAL at 19:09

## 2022-01-01 RX ADMIN — LUBIPROSTONE 24 MCG: 24 CAPSULE, GELATIN COATED ORAL at 15:10

## 2022-01-01 RX ADMIN — METOPROLOL TARTRATE 37.5 MG: 25 TABLET, FILM COATED ORAL at 15:10

## 2022-01-01 RX ADMIN — METOPROLOL TARTRATE 37.5 MG: 25 TABLET, FILM COATED ORAL at 21:42

## 2022-01-01 RX ADMIN — Medication 10 ML: at 08:12

## 2022-01-01 RX ADMIN — INSULIN LISPRO 2 UNITS: 100 INJECTION, SOLUTION INTRAVENOUS; SUBCUTANEOUS at 17:35

## 2022-01-01 RX ADMIN — CLOPIDOGREL BISULFATE 75 MG: 75 TABLET ORAL at 08:02

## 2022-01-01 RX ADMIN — MIDODRINE HYDROCHLORIDE 10 MG: 5 TABLET ORAL at 09:46

## 2022-01-01 RX ADMIN — SERTRALINE 50 MG: 50 TABLET, FILM COATED ORAL at 08:19

## 2022-01-01 RX ADMIN — DONEPEZIL HYDROCHLORIDE 10 MG: 5 TABLET, FILM COATED ORAL at 20:15

## 2022-01-01 RX ADMIN — HEPARIN SODIUM 4000 UNITS: 1000 INJECTION INTRAVENOUS; SUBCUTANEOUS at 12:46

## 2022-01-01 RX ADMIN — Medication 10 ML: at 20:15

## 2022-01-01 RX ADMIN — CEFTRIAXONE 2 G: 2 INJECTION, POWDER, FOR SOLUTION INTRAMUSCULAR; INTRAVENOUS at 04:56

## 2022-01-01 RX ADMIN — Medication 10 ML: at 20:09

## 2022-01-01 RX ADMIN — ATORVASTATIN CALCIUM 40 MG: 40 TABLET, FILM COATED ORAL at 20:22

## 2022-01-01 RX ADMIN — LEVOTHYROXINE SODIUM 50 MCG: 0.05 TABLET ORAL at 05:31

## 2022-01-01 RX ADMIN — CYANOCOBALAMIN TAB 1000 MCG 1000 MCG: 1000 TAB at 09:03

## 2022-01-01 RX ADMIN — INSULIN HUMAN 10 UNITS: 100 INJECTION, SUSPENSION SUBCUTANEOUS at 09:39

## 2022-01-01 RX ADMIN — Medication 10 ML: at 09:05

## 2022-01-01 RX ADMIN — GABAPENTIN 100 MG: 100 CAPSULE ORAL at 20:08

## 2022-01-01 RX ADMIN — ATORVASTATIN CALCIUM 40 MG: 40 TABLET, FILM COATED ORAL at 20:15

## 2022-01-01 RX ADMIN — LEVOTHYROXINE SODIUM 50 MCG: 0.05 TABLET ORAL at 05:37

## 2022-01-01 RX ADMIN — DOCUSATE SODIUM 100 MG: 100 CAPSULE, LIQUID FILLED ORAL at 09:23

## 2022-01-01 RX ADMIN — MIDODRINE HYDROCHLORIDE 10 MG: 5 TABLET ORAL at 11:50

## 2022-01-01 RX ADMIN — BUDESONIDE 0.5 MG: 0.5 INHALANT RESPIRATORY (INHALATION) at 06:54

## 2022-01-01 RX ADMIN — METOPROLOL TARTRATE 37.5 MG: 25 TABLET, FILM COATED ORAL at 08:31

## 2022-01-01 RX ADMIN — IPRATROPIUM BROMIDE AND ALBUTEROL SULFATE 3 ML: .5; 3 SOLUTION RESPIRATORY (INHALATION) at 07:45

## 2022-01-01 RX ADMIN — SERTRALINE 50 MG: 50 TABLET, FILM COATED ORAL at 08:14

## 2022-01-01 RX ADMIN — ATORVASTATIN CALCIUM 40 MG: 40 TABLET, FILM COATED ORAL at 09:40

## 2022-01-01 RX ADMIN — MIDODRINE HYDROCHLORIDE 15 MG: 5 TABLET ORAL at 12:06

## 2022-01-01 RX ADMIN — INSULIN HUMAN 10 UNITS: 100 INJECTION, SUSPENSION SUBCUTANEOUS at 10:40

## 2022-01-01 RX ADMIN — Medication 10 ML: at 08:24

## 2022-01-01 RX ADMIN — APIXABAN 5 MG: 5 TABLET, FILM COATED ORAL at 21:57

## 2022-01-01 RX ADMIN — MIDODRINE HYDROCHLORIDE 10 MG: 5 TABLET ORAL at 08:20

## 2022-01-01 RX ADMIN — MIDODRINE HYDROCHLORIDE 15 MG: 5 TABLET ORAL at 06:33

## 2022-01-01 RX ADMIN — ASPIRIN 81 MG CHEWABLE TABLET 81 MG: 81 TABLET CHEWABLE at 21:57

## 2022-01-01 RX ADMIN — PRIMIDONE 25 MG: 50 TABLET ORAL at 09:25

## 2022-01-01 RX ADMIN — MIDODRINE HYDROCHLORIDE 10 MG: 5 TABLET ORAL at 17:30

## 2022-01-01 RX ADMIN — MIDODRINE HYDROCHLORIDE 15 MG: 5 TABLET ORAL at 08:20

## 2022-01-01 RX ADMIN — SERTRALINE 50 MG: 50 TABLET, FILM COATED ORAL at 09:46

## 2022-01-01 RX ADMIN — HYDROCODONE BITARTRATE AND ACETAMINOPHEN 1 TABLET: 10; 325 TABLET ORAL at 23:18

## 2022-01-01 RX ADMIN — APIXABAN 5 MG: 5 TABLET, FILM COATED ORAL at 08:28

## 2022-01-01 RX ADMIN — SERTRALINE 50 MG: 50 TABLET, FILM COATED ORAL at 08:00

## 2022-01-01 RX ADMIN — BUDESONIDE 0.5 MG: 0.5 INHALANT RESPIRATORY (INHALATION) at 09:00

## 2022-01-01 RX ADMIN — MIDODRINE HYDROCHLORIDE 10 MG: 5 TABLET ORAL at 16:59

## 2022-01-01 RX ADMIN — AMIODARONE HYDROCHLORIDE 1 MG/MIN: 1.8 INJECTION, SOLUTION INTRAVENOUS at 03:08

## 2022-01-01 RX ADMIN — LEVOTHYROXINE SODIUM 50 MCG: 0.05 TABLET ORAL at 05:12

## 2022-01-01 RX ADMIN — ATORVASTATIN CALCIUM 40 MG: 40 TABLET, FILM COATED ORAL at 20:18

## 2022-01-01 RX ADMIN — ONDANSETRON 4 MG: 2 INJECTION INTRAMUSCULAR; INTRAVENOUS at 08:10

## 2022-01-01 RX ADMIN — DIPHENHYDRAMINE HYDROCHLORIDE 25 MG: 50 INJECTION, SOLUTION INTRAMUSCULAR; INTRAVENOUS at 20:19

## 2022-01-01 RX ADMIN — BUDESONIDE 0.5 MG: 0.5 INHALANT RESPIRATORY (INHALATION) at 18:20

## 2022-01-01 RX ADMIN — IPRATROPIUM BROMIDE AND ALBUTEROL SULFATE 3 ML: .5; 3 SOLUTION RESPIRATORY (INHALATION) at 10:59

## 2022-01-01 RX ADMIN — MIDODRINE HYDROCHLORIDE 15 MG: 5 TABLET ORAL at 09:22

## 2022-01-01 RX ADMIN — ATORVASTATIN CALCIUM 40 MG: 40 TABLET, FILM COATED ORAL at 08:00

## 2022-01-01 RX ADMIN — DOCUSATE SODIUM 100 MG: 100 CAPSULE, LIQUID FILLED ORAL at 10:37

## 2022-01-01 RX ADMIN — METOPROLOL TARTRATE 25 MG: 25 TABLET, FILM COATED ORAL at 08:00

## 2022-01-01 RX ADMIN — LEVOTHYROXINE SODIUM 50 MCG: 0.05 TABLET ORAL at 06:33

## 2022-01-01 RX ADMIN — LIDOCAINE HYDROCHLORIDE 20 ML: 10 INJECTION, SOLUTION INFILTRATION; PERINEURAL at 04:42

## 2022-01-01 RX ADMIN — CLOPIDOGREL BISULFATE 75 MG: 75 TABLET ORAL at 08:48

## 2022-01-01 RX ADMIN — Medication 1000 UNITS: at 08:20

## 2022-01-01 RX ADMIN — Medication 1000 UNITS: at 11:13

## 2022-01-01 RX ADMIN — IPRATROPIUM BROMIDE AND ALBUTEROL SULFATE 3 ML: .5; 3 SOLUTION RESPIRATORY (INHALATION) at 19:45

## 2022-01-01 RX ADMIN — METOPROLOL TARTRATE 25 MG: 25 TABLET, FILM COATED ORAL at 09:22

## 2022-01-01 RX ADMIN — Medication 10 ML: at 09:06

## 2022-01-01 RX ADMIN — Medication 5 MG: at 20:56

## 2022-01-01 RX ADMIN — FUROSEMIDE 40 MG: 10 INJECTION, SOLUTION INTRAMUSCULAR; INTRAVENOUS at 23:51

## 2022-01-01 RX ADMIN — INSULIN LISPRO 3 UNITS: 100 INJECTION, SOLUTION INTRAVENOUS; SUBCUTANEOUS at 12:20

## 2022-01-01 RX ADMIN — MORPHINE SULFATE 2 MG: 2 INJECTION, SOLUTION INTRAMUSCULAR; INTRAVENOUS at 06:00

## 2022-01-01 RX ADMIN — BUDESONIDE 0.5 MG: 0.5 INHALANT RESPIRATORY (INHALATION) at 18:36

## 2022-01-01 RX ADMIN — IPRATROPIUM BROMIDE AND ALBUTEROL SULFATE 3 ML: .5; 3 SOLUTION RESPIRATORY (INHALATION) at 06:38

## 2022-01-01 RX ADMIN — MIDODRINE HYDROCHLORIDE 10 MG: 5 TABLET ORAL at 09:39

## 2022-01-01 RX ADMIN — CEFTRIAXONE 2 G: 2 INJECTION, POWDER, FOR SOLUTION INTRAMUSCULAR; INTRAVENOUS at 05:14

## 2022-01-01 RX ADMIN — DONEPEZIL HYDROCHLORIDE 10 MG: 5 TABLET, FILM COATED ORAL at 20:08

## 2022-01-01 RX ADMIN — HYDROCODONE BITARTRATE AND ACETAMINOPHEN 1 TABLET: 7.5; 325 TABLET ORAL at 12:36

## 2022-01-01 RX ADMIN — ATORVASTATIN CALCIUM 40 MG: 40 TABLET, FILM COATED ORAL at 21:47

## 2022-01-01 RX ADMIN — INSULIN LISPRO 3 UNITS: 100 INJECTION, SOLUTION INTRAVENOUS; SUBCUTANEOUS at 13:07

## 2022-01-01 RX ADMIN — FUROSEMIDE 40 MG: 10 INJECTION, SOLUTION INTRAMUSCULAR; INTRAVENOUS at 12:20

## 2022-01-01 RX ADMIN — HYDROCODONE BITARTRATE AND ACETAMINOPHEN 1 TABLET: 7.5; 325 TABLET ORAL at 20:01

## 2022-01-01 RX ADMIN — APIXABAN 5 MG: 5 TABLET, FILM COATED ORAL at 21:47

## 2022-01-01 RX ADMIN — ACETAMINOPHEN 650 MG: 325 TABLET ORAL at 08:37

## 2022-01-01 RX ADMIN — HEPARIN SODIUM 5000 UNITS: 5000 INJECTION, SOLUTION INTRAVENOUS; SUBCUTANEOUS at 20:09

## 2022-01-01 RX ADMIN — Medication 0.4 MG: at 13:51

## 2022-01-01 RX ADMIN — MIDODRINE HYDROCHLORIDE 10 MG: 5 TABLET ORAL at 06:46

## 2022-01-01 RX ADMIN — ALBUTEROL SULFATE 2.5 MG: 2.5 SOLUTION RESPIRATORY (INHALATION) at 18:57

## 2022-01-01 RX ADMIN — MIDODRINE HYDROCHLORIDE 10 MG: 5 TABLET ORAL at 09:23

## 2022-01-01 RX ADMIN — PRIMIDONE 25 MG: 50 TABLET ORAL at 08:28

## 2022-01-01 RX ADMIN — IPRATROPIUM BROMIDE AND ALBUTEROL SULFATE 3 ML: .5; 3 SOLUTION RESPIRATORY (INHALATION) at 11:26

## 2022-01-01 RX ADMIN — GUAIFENESIN 600 MG: 600 TABLET, EXTENDED RELEASE ORAL at 15:58

## 2022-01-01 RX ADMIN — AMIODARONE HYDROCHLORIDE 200 MG: 200 TABLET ORAL at 09:45

## 2022-01-01 RX ADMIN — METOPROLOL TARTRATE 25 MG: 25 TABLET, FILM COATED ORAL at 08:50

## 2022-01-01 RX ADMIN — HEPARIN SODIUM 5000 UNITS: 1000 INJECTION INTRAVENOUS; SUBCUTANEOUS at 10:40

## 2022-01-01 RX ADMIN — ACETAMINOPHEN 650 MG: 325 TABLET ORAL at 12:14

## 2022-01-01 RX ADMIN — SERTRALINE 50 MG: 50 TABLET, FILM COATED ORAL at 08:16

## 2022-01-01 RX ADMIN — IPRATROPIUM BROMIDE AND ALBUTEROL SULFATE 3 ML: .5; 3 SOLUTION RESPIRATORY (INHALATION) at 12:04

## 2022-01-01 RX ADMIN — APIXABAN 5 MG: 5 TABLET, FILM COATED ORAL at 20:21

## 2022-01-01 RX ADMIN — DONEPEZIL HYDROCHLORIDE 10 MG: 5 TABLET, FILM COATED ORAL at 20:23

## 2022-01-01 RX ADMIN — SODIUM CHLORIDE: 0.9 INJECTION, SOLUTION INTRAVENOUS at 10:06

## 2022-01-01 RX ADMIN — IPRATROPIUM BROMIDE AND ALBUTEROL SULFATE 3 ML: .5; 3 SOLUTION RESPIRATORY (INHALATION) at 20:15

## 2022-01-01 RX ADMIN — CYANOCOBALAMIN TAB 1000 MCG 1000 MCG: 1000 TAB at 08:21

## 2022-01-01 RX ADMIN — BUDESONIDE 0.5 MG: 0.5 INHALANT RESPIRATORY (INHALATION) at 06:30

## 2022-01-01 RX ADMIN — GUAIFENESIN 600 MG: 600 TABLET, EXTENDED RELEASE ORAL at 09:24

## 2022-01-01 RX ADMIN — METOPROLOL TARTRATE 37.5 MG: 25 TABLET, FILM COATED ORAL at 21:10

## 2022-01-01 RX ADMIN — BUDESONIDE 0.5 MG: 0.5 INHALANT RESPIRATORY (INHALATION) at 20:19

## 2022-01-01 RX ADMIN — ACETAMINOPHEN 650 MG: 325 TABLET ORAL at 19:37

## 2022-01-01 RX ADMIN — OXYCODONE 5 MG: 5 TABLET ORAL at 02:01

## 2022-01-01 RX ADMIN — BUDESONIDE 0.5 MG: 0.5 INHALANT RESPIRATORY (INHALATION) at 07:31

## 2022-01-01 RX ADMIN — METOPROLOL TARTRATE 37.5 MG: 25 TABLET, FILM COATED ORAL at 10:15

## 2022-01-01 RX ADMIN — PRIMIDONE 25 MG: 50 TABLET ORAL at 15:59

## 2022-01-01 RX ADMIN — BUDESONIDE 0.5 MG: 0.5 INHALANT RESPIRATORY (INHALATION) at 07:06

## 2022-01-01 RX ADMIN — POLYETHYLENE GLYCOL 3350 17 G: 17 POWDER, FOR SOLUTION ORAL at 20:01

## 2022-01-01 RX ADMIN — APIXABAN 5 MG: 5 TABLET, FILM COATED ORAL at 09:04

## 2022-01-01 RX ADMIN — HYDROCODONE BITARTRATE AND ACETAMINOPHEN 1 TABLET: 7.5; 325 TABLET ORAL at 10:14

## 2022-01-01 RX ADMIN — LEVOTHYROXINE SODIUM 50 MCG: 0.05 TABLET ORAL at 06:15

## 2022-01-01 RX ADMIN — DONEPEZIL HYDROCHLORIDE 10 MG: 5 TABLET, FILM COATED ORAL at 20:12

## 2022-01-01 RX ADMIN — PANTOPRAZOLE SODIUM 40 MG: 40 TABLET, DELAYED RELEASE ORAL at 08:21

## 2022-01-01 RX ADMIN — SERTRALINE 50 MG: 50 TABLET, FILM COATED ORAL at 10:51

## 2022-01-01 RX ADMIN — Medication 10 ML: at 09:43

## 2022-01-01 RX ADMIN — IPRATROPIUM BROMIDE AND ALBUTEROL SULFATE 3 ML: .5; 3 SOLUTION RESPIRATORY (INHALATION) at 20:37

## 2022-01-01 RX ADMIN — Medication 10 ML: at 09:00

## 2022-01-01 RX ADMIN — MIDODRINE HYDROCHLORIDE 10 MG: 5 TABLET ORAL at 13:02

## 2022-01-01 RX ADMIN — SODIUM PHOSPHATE, MONOBASIC, MONOHYDRATE AND SODIUM PHOSPHATE, DIBASIC, ANHYDROUS 15 MMOL: 276; 142 INJECTION, SOLUTION INTRAVENOUS at 11:48

## 2022-01-01 RX ADMIN — IPRATROPIUM BROMIDE 0.5 MG: 0.5 SOLUTION RESPIRATORY (INHALATION) at 12:02

## 2022-01-01 RX ADMIN — PROPOFOL 45 MCG/KG/MIN: 10 INJECTION, EMULSION INTRAVENOUS at 10:19

## 2022-01-01 RX ADMIN — IPRATROPIUM BROMIDE AND ALBUTEROL SULFATE 3 ML: .5; 3 SOLUTION RESPIRATORY (INHALATION) at 08:55

## 2022-01-01 RX ADMIN — OXYCODONE 5 MG: 5 TABLET ORAL at 12:09

## 2022-01-01 RX ADMIN — PROPOFOL 60 MG: 10 INJECTION, EMULSION INTRAVENOUS at 12:55

## 2022-01-01 RX ADMIN — GUAIFENESIN 600 MG: 600 TABLET, EXTENDED RELEASE ORAL at 09:23

## 2022-01-01 RX ADMIN — SODIUM SULFATE, POTASSIUM SULFATE, MAGNESIUM SULFATE 1 BOTTLE: 17.5; 3.13; 1.6 SOLUTION, CONCENTRATE ORAL at 15:14

## 2022-01-01 RX ADMIN — Medication 10 ML: at 21:23

## 2022-01-01 RX ADMIN — SORBITOL SOLUTION (BULK) 50 ML: 70 SOLUTION at 08:16

## 2022-01-01 RX ADMIN — INSULIN LISPRO 4 UNITS: 100 INJECTION, SOLUTION INTRAVENOUS; SUBCUTANEOUS at 12:18

## 2022-01-01 RX ADMIN — FUROSEMIDE 40 MG: 10 INJECTION, SOLUTION INTRAMUSCULAR; INTRAVENOUS at 18:23

## 2022-01-01 RX ADMIN — GUAIFENESIN 600 MG: 600 TABLET, EXTENDED RELEASE ORAL at 09:03

## 2022-01-01 RX ADMIN — MIDODRINE HYDROCHLORIDE 15 MG: 5 TABLET ORAL at 08:00

## 2022-01-01 RX ADMIN — Medication 200 MCG: at 14:10

## 2022-01-01 RX ADMIN — POLYETHYLENE GLYCOL 3350 17 G: 17 POWDER, FOR SOLUTION ORAL at 21:13

## 2022-01-01 RX ADMIN — APIXABAN 5 MG: 5 TABLET, FILM COATED ORAL at 22:11

## 2022-01-01 RX ADMIN — MIDODRINE HYDROCHLORIDE 10 MG: 5 TABLET ORAL at 16:41

## 2022-01-01 RX ADMIN — ACETAMINOPHEN 650 MG: 325 TABLET ORAL at 06:56

## 2022-01-01 RX ADMIN — PRIMIDONE 25 MG: 50 TABLET ORAL at 11:13

## 2022-01-01 RX ADMIN — LIDOCAINE HYDROCHLORIDE 20 MG: 10 INJECTION, SOLUTION EPIDURAL; INFILTRATION; INTRACAUDAL; PERINEURAL at 12:55

## 2022-01-01 RX ADMIN — Medication 10 ML: at 12:40

## 2022-01-01 RX ADMIN — APIXABAN 5 MG: 5 TABLET, FILM COATED ORAL at 23:35

## 2022-01-01 RX ADMIN — PANTOPRAZOLE SODIUM 40 MG: 40 TABLET, DELAYED RELEASE ORAL at 09:40

## 2022-01-01 RX ADMIN — APIXABAN 5 MG: 5 TABLET, FILM COATED ORAL at 09:25

## 2022-01-01 RX ADMIN — POLYETHYLENE GLYCOL 3350 17 G: 17 POWDER, FOR SOLUTION ORAL at 08:17

## 2022-01-01 RX ADMIN — PANTOPRAZOLE SODIUM 40 MG: 40 TABLET, DELAYED RELEASE ORAL at 08:27

## 2022-01-01 RX ADMIN — PANTOPRAZOLE SODIUM 40 MG: 40 TABLET, DELAYED RELEASE ORAL at 16:42

## 2022-01-01 RX ADMIN — GABAPENTIN 100 MG: 100 CAPSULE ORAL at 20:00

## 2022-01-01 RX ADMIN — DOCUSATE SODIUM 100 MG: 100 CAPSULE, LIQUID FILLED ORAL at 09:05

## 2022-01-01 RX ADMIN — MIDODRINE HYDROCHLORIDE 10 MG: 5 TABLET ORAL at 18:17

## 2022-01-01 RX ADMIN — IPRATROPIUM BROMIDE AND ALBUTEROL SULFATE 3 ML: .5; 3 SOLUTION RESPIRATORY (INHALATION) at 08:03

## 2022-01-01 RX ADMIN — BUDESONIDE 0.5 MG: 0.5 INHALANT RESPIRATORY (INHALATION) at 07:51

## 2022-01-01 RX ADMIN — SERTRALINE 50 MG: 50 TABLET, FILM COATED ORAL at 09:05

## 2022-01-01 RX ADMIN — POLYETHYLENE GLYCOL 3350 17 G: 17 POWDER, FOR SOLUTION ORAL at 08:19

## 2022-01-01 RX ADMIN — PRIMIDONE 25 MG: 50 TABLET ORAL at 09:08

## 2022-01-01 RX ADMIN — MIDODRINE HYDROCHLORIDE 10 MG: 5 TABLET ORAL at 21:55

## 2022-01-01 RX ADMIN — GUAIFENESIN 1200 MG: 600 TABLET, EXTENDED RELEASE ORAL at 10:13

## 2022-01-01 RX ADMIN — PANTOPRAZOLE SODIUM 40 MG: 40 TABLET, DELAYED RELEASE ORAL at 09:05

## 2022-01-01 RX ADMIN — IPRATROPIUM BROMIDE AND ALBUTEROL SULFATE 3 ML: .5; 3 SOLUTION RESPIRATORY (INHALATION) at 08:42

## 2022-01-01 RX ADMIN — METOPROLOL TARTRATE 37.5 MG: 25 TABLET, FILM COATED ORAL at 08:20

## 2022-01-01 RX ADMIN — APIXABAN 2.5 MG: 2.5 TABLET, FILM COATED ORAL at 21:42

## 2022-01-01 RX ADMIN — POLYETHYLENE GLYCOL 3350 17 G: 17 POWDER, FOR SOLUTION ORAL at 20:20

## 2022-01-01 RX ADMIN — HYDROCODONE BITARTRATE AND ACETAMINOPHEN 1 TABLET: 7.5; 325 TABLET ORAL at 21:48

## 2022-01-01 RX ADMIN — MIDODRINE HYDROCHLORIDE 15 MG: 5 TABLET ORAL at 08:32

## 2022-01-01 RX ADMIN — INSULIN HUMAN 10 UNITS: 100 INJECTION, SUSPENSION SUBCUTANEOUS at 08:32

## 2022-01-01 RX ADMIN — Medication 10 ML: at 20:46

## 2022-01-01 RX ADMIN — METOPROLOL TARTRATE 37.5 MG: 25 TABLET, FILM COATED ORAL at 20:22

## 2022-01-01 RX ADMIN — METOPROLOL TARTRATE 37.5 MG: 25 TABLET, FILM COATED ORAL at 08:58

## 2022-01-01 RX ADMIN — BUDESONIDE 0.5 MG: 0.5 INHALANT RESPIRATORY (INHALATION) at 19:58

## 2022-01-01 RX ADMIN — Medication 100 MCG: at 10:21

## 2022-01-01 RX ADMIN — BUDESONIDE 0.5 MG: 0.5 INHALANT RESPIRATORY (INHALATION) at 19:11

## 2022-01-01 RX ADMIN — Medication 10 ML: at 10:04

## 2022-01-01 RX ADMIN — ATORVASTATIN CALCIUM 40 MG: 40 TABLET, FILM COATED ORAL at 21:13

## 2022-01-01 RX ADMIN — GUAIFENESIN 1200 MG: 600 TABLET, EXTENDED RELEASE ORAL at 08:21

## 2022-01-01 RX ADMIN — APIXABAN 5 MG: 5 TABLET, FILM COATED ORAL at 20:51

## 2022-01-01 RX ADMIN — SERTRALINE 50 MG: 50 TABLET, FILM COATED ORAL at 09:13

## 2022-01-01 RX ADMIN — GABAPENTIN 100 MG: 100 CAPSULE ORAL at 10:15

## 2022-01-01 RX ADMIN — LIDOCAINE HYDROCHLORIDE 50 MG: 20 INJECTION, SOLUTION EPIDURAL; INFILTRATION; INTRACAUDAL; PERINEURAL at 13:52

## 2022-01-01 RX ADMIN — GABAPENTIN 100 MG: 100 CAPSULE ORAL at 08:00

## 2022-01-01 RX ADMIN — IPRATROPIUM BROMIDE AND ALBUTEROL SULFATE 3 ML: .5; 3 SOLUTION RESPIRATORY (INHALATION) at 18:25

## 2022-01-01 RX ADMIN — OXYCODONE 5 MG: 5 TABLET ORAL at 16:41

## 2022-01-01 RX ADMIN — GABAPENTIN 100 MG: 100 CAPSULE ORAL at 21:42

## 2022-01-01 RX ADMIN — METOPROLOL TARTRATE 37.5 MG: 25 TABLET, FILM COATED ORAL at 12:36

## 2022-01-01 RX ADMIN — METOPROLOL TARTRATE 25 MG: 25 TABLET, FILM COATED ORAL at 10:51

## 2022-01-01 RX ADMIN — IPRATROPIUM BROMIDE AND ALBUTEROL SULFATE 3 ML: .5; 3 SOLUTION RESPIRATORY (INHALATION) at 07:22

## 2022-01-01 RX ADMIN — Medication 10 ML: at 21:52

## 2022-01-01 RX ADMIN — ATORVASTATIN CALCIUM 40 MG: 40 TABLET, FILM COATED ORAL at 11:13

## 2022-01-01 RX ADMIN — DOCUSATE SODIUM 100 MG: 100 CAPSULE, LIQUID FILLED ORAL at 09:40

## 2022-01-01 RX ADMIN — HEPARIN SODIUM 5000 UNITS: 5000 INJECTION, SOLUTION INTRAVENOUS; SUBCUTANEOUS at 08:32

## 2022-01-01 RX ADMIN — METOPROLOL TARTRATE 25 MG: 25 TABLET, FILM COATED ORAL at 20:26

## 2022-01-01 RX ADMIN — SERTRALINE 50 MG: 50 TABLET, FILM COATED ORAL at 15:59

## 2022-01-01 RX ADMIN — CLOPIDOGREL BISULFATE 75 MG: 75 TABLET ORAL at 09:22

## 2022-01-01 RX ADMIN — SERTRALINE 50 MG: 50 TABLET, FILM COATED ORAL at 09:00

## 2022-01-01 RX ADMIN — MIDODRINE HYDROCHLORIDE 15 MG: 5 TABLET ORAL at 08:03

## 2022-01-01 RX ADMIN — GUAIFENESIN 600 MG: 600 TABLET, EXTENDED RELEASE ORAL at 21:23

## 2022-01-01 RX ADMIN — ATORVASTATIN CALCIUM 40 MG: 40 TABLET, FILM COATED ORAL at 20:56

## 2022-01-01 RX ADMIN — MIDODRINE HYDROCHLORIDE 15 MG: 5 TABLET ORAL at 19:04

## 2022-01-01 RX ADMIN — BUDESONIDE 0.5 MG: 0.5 INHALANT RESPIRATORY (INHALATION) at 08:03

## 2022-01-01 RX ADMIN — GABAPENTIN 100 MG: 100 CAPSULE ORAL at 20:01

## 2022-01-01 RX ADMIN — BUDESONIDE 0.5 MG: 0.5 INHALANT RESPIRATORY (INHALATION) at 20:26

## 2022-01-01 RX ADMIN — MIDODRINE HYDROCHLORIDE 15 MG: 5 TABLET ORAL at 11:48

## 2022-01-01 RX ADMIN — PRIMIDONE 25 MG: 50 TABLET ORAL at 12:37

## 2022-01-01 RX ADMIN — Medication 10 ML: at 20:03

## 2022-01-01 RX ADMIN — LEVOTHYROXINE SODIUM 50 MCG: 0.05 TABLET ORAL at 05:22

## 2022-01-01 RX ADMIN — ACETAMINOPHEN 650 MG: 325 TABLET ORAL at 15:44

## 2022-01-01 RX ADMIN — MIDODRINE HYDROCHLORIDE 15 MG: 5 TABLET ORAL at 17:45

## 2022-01-01 RX ADMIN — IPRATROPIUM BROMIDE AND ALBUTEROL SULFATE 3 ML: .5; 3 SOLUTION RESPIRATORY (INHALATION) at 21:03

## 2022-01-01 RX ADMIN — HEPARIN SODIUM 5000 UNITS: 5000 INJECTION, SOLUTION INTRAVENOUS; SUBCUTANEOUS at 08:00

## 2022-01-01 RX ADMIN — PRIMIDONE 25 MG: 50 TABLET ORAL at 09:26

## 2022-01-01 RX ADMIN — Medication 10 ML: at 20:24

## 2022-01-01 RX ADMIN — DONEPEZIL HYDROCHLORIDE 10 MG: 5 TABLET, FILM COATED ORAL at 21:13

## 2022-01-01 RX ADMIN — PANTOPRAZOLE SODIUM 40 MG: 40 TABLET, DELAYED RELEASE ORAL at 09:26

## 2022-01-01 RX ADMIN — SERTRALINE 50 MG: 50 TABLET, FILM COATED ORAL at 10:12

## 2022-01-01 RX ADMIN — HEPARIN SODIUM 5000 UNITS: 5000 INJECTION, SOLUTION INTRAVENOUS; SUBCUTANEOUS at 20:49

## 2022-01-01 RX ADMIN — INSULIN HUMAN 10 UNITS: 100 INJECTION, SUSPENSION SUBCUTANEOUS at 11:14

## 2022-01-01 RX ADMIN — Medication 10 ML: at 08:01

## 2022-01-01 RX ADMIN — MIDODRINE HYDROCHLORIDE 10 MG: 5 TABLET ORAL at 11:13

## 2022-01-01 RX ADMIN — MIDODRINE HYDROCHLORIDE 10 MG: 5 TABLET ORAL at 08:27

## 2022-01-01 RX ADMIN — ONDANSETRON HYDROCHLORIDE 4 MG: 4 TABLET, FILM COATED ORAL at 08:00

## 2022-01-01 RX ADMIN — CARBAMIDE PEROXIDE 6.5% 10 DROP: 6.5 LIQUID AURICULAR (OTIC) at 23:36

## 2022-01-01 RX ADMIN — APIXABAN 5 MG: 5 TABLET, FILM COATED ORAL at 15:59

## 2022-01-01 RX ADMIN — Medication 10 ML: at 20:18

## 2022-01-01 RX ADMIN — ATORVASTATIN CALCIUM 40 MG: 40 TABLET, FILM COATED ORAL at 22:13

## 2022-01-01 RX ADMIN — CALCIUM CARBONATE 2 TABLET: 500 TABLET, CHEWABLE ORAL at 21:31

## 2022-01-01 RX ADMIN — SERTRALINE 50 MG: 50 TABLET, FILM COATED ORAL at 12:36

## 2022-01-01 RX ADMIN — PRIMIDONE 25 MG: 50 TABLET ORAL at 09:03

## 2022-01-01 RX ADMIN — PANTOPRAZOLE SODIUM 40 MG: 40 TABLET, DELAYED RELEASE ORAL at 08:14

## 2022-01-01 RX ADMIN — ONDANSETRON 4 MG: 2 INJECTION INTRAMUSCULAR; INTRAVENOUS at 08:18

## 2022-01-01 RX ADMIN — ONDANSETRON 4 MG: 2 INJECTION INTRAMUSCULAR; INTRAVENOUS at 12:08

## 2022-01-01 RX ADMIN — METOPROLOL TARTRATE 37.5 MG: 25 TABLET, FILM COATED ORAL at 09:46

## 2022-01-01 RX ADMIN — PANTOPRAZOLE SODIUM 40 MG: 40 TABLET, DELAYED RELEASE ORAL at 10:36

## 2022-01-01 RX ADMIN — PANTOPRAZOLE SODIUM 40 MG: 40 TABLET, DELAYED RELEASE ORAL at 08:29

## 2022-01-01 RX ADMIN — CYANOCOBALAMIN TAB 1000 MCG 1000 MCG: 1000 TAB at 09:45

## 2022-01-01 RX ADMIN — GUAIFENESIN 600 MG: 600 TABLET, EXTENDED RELEASE ORAL at 22:13

## 2022-01-01 RX ADMIN — Medication 10 ML: at 20:12

## 2022-01-01 RX ADMIN — METOPROLOL TARTRATE 25 MG: 25 TABLET, FILM COATED ORAL at 09:03

## 2022-01-01 RX ADMIN — DEXTROSE MONOHYDRATE 25 G: 25 INJECTION, SOLUTION INTRAVENOUS at 11:45

## 2022-01-01 RX ADMIN — METOPROLOL TARTRATE 25 MG: 25 TABLET, FILM COATED ORAL at 21:24

## 2022-01-01 RX ADMIN — PANTOPRAZOLE SODIUM 40 MG: 40 TABLET, DELAYED RELEASE ORAL at 08:59

## 2022-01-01 RX ADMIN — LEVOTHYROXINE SODIUM 50 MCG: 0.05 TABLET ORAL at 15:11

## 2022-01-01 RX ADMIN — Medication 10 ML: at 08:23

## 2022-01-01 RX ADMIN — APIXABAN 5 MG: 5 TABLET, FILM COATED ORAL at 10:37

## 2022-01-01 RX ADMIN — IPRATROPIUM BROMIDE AND ALBUTEROL SULFATE 3 ML: .5; 3 SOLUTION RESPIRATORY (INHALATION) at 18:54

## 2022-01-01 RX ADMIN — SERTRALINE 50 MG: 50 TABLET, FILM COATED ORAL at 08:20

## 2022-01-01 RX ADMIN — DONEPEZIL HYDROCHLORIDE 10 MG: 5 TABLET, FILM COATED ORAL at 23:37

## 2022-01-01 RX ADMIN — BUDESONIDE 0.5 MG: 0.5 INHALANT RESPIRATORY (INHALATION) at 19:08

## 2022-01-01 RX ADMIN — GABAPENTIN 100 MG: 100 CAPSULE ORAL at 08:21

## 2022-01-01 RX ADMIN — PRIMIDONE 25 MG: 50 TABLET ORAL at 10:36

## 2022-01-01 RX ADMIN — Medication 10 ML: at 20:49

## 2022-01-01 RX ADMIN — METOPROLOL TARTRATE 37.5 MG: 25 TABLET, FILM COATED ORAL at 21:23

## 2022-01-01 RX ADMIN — LEVOTHYROXINE SODIUM 50 MCG: 0.05 TABLET ORAL at 09:23

## 2022-01-01 RX ADMIN — MIDODRINE HYDROCHLORIDE 15 MG: 5 TABLET ORAL at 09:42

## 2022-01-01 RX ADMIN — ALBUTEROL SULFATE 2.5 MG: 2.5 SOLUTION RESPIRATORY (INHALATION) at 03:09

## 2022-01-01 RX ADMIN — GUAIFENESIN 600 MG: 600 TABLET, EXTENDED RELEASE ORAL at 20:55

## 2022-01-01 RX ADMIN — Medication 10 ML: at 08:00

## 2022-01-01 RX ADMIN — METOPROLOL TARTRATE 37.5 MG: 25 TABLET, FILM COATED ORAL at 09:25

## 2022-01-01 RX ADMIN — AMIODARONE HYDROCHLORIDE 200 MG: 200 TABLET ORAL at 08:32

## 2022-01-01 RX ADMIN — IPRATROPIUM BROMIDE AND ALBUTEROL SULFATE 3 ML: .5; 3 SOLUTION RESPIRATORY (INHALATION) at 18:45

## 2022-01-01 RX ADMIN — MIDODRINE HYDROCHLORIDE 10 MG: 5 TABLET ORAL at 12:05

## 2022-01-01 RX ADMIN — IPRATROPIUM BROMIDE AND ALBUTEROL SULFATE 3 ML: .5; 3 SOLUTION RESPIRATORY (INHALATION) at 06:26

## 2022-01-01 RX ADMIN — IPRATROPIUM BROMIDE AND ALBUTEROL SULFATE 3 ML: .5; 3 SOLUTION RESPIRATORY (INHALATION) at 07:02

## 2022-01-01 RX ADMIN — MIDODRINE HYDROCHLORIDE 10 MG: 5 TABLET ORAL at 17:54

## 2022-01-01 RX ADMIN — ASPIRIN 81 MG: 81 TABLET, COATED ORAL at 09:22

## 2022-01-01 RX ADMIN — APIXABAN 2.5 MG: 2.5 TABLET, FILM COATED ORAL at 12:05

## 2022-01-01 RX ADMIN — IPRATROPIUM BROMIDE AND ALBUTEROL SULFATE 3 ML: .5; 3 SOLUTION RESPIRATORY (INHALATION) at 11:36

## 2022-01-01 RX ADMIN — CEFTRIAXONE 2 G: 2 INJECTION, POWDER, FOR SOLUTION INTRAMUSCULAR; INTRAVENOUS at 03:31

## 2022-01-01 RX ADMIN — Medication 10 ML: at 21:58

## 2022-01-01 RX ADMIN — AMIODARONE HYDROCHLORIDE 200 MG: 200 TABLET ORAL at 08:14

## 2022-01-01 RX ADMIN — BUDESONIDE 0.5 MG: 0.5 INHALANT RESPIRATORY (INHALATION) at 17:54

## 2022-01-01 RX ADMIN — ACETAMINOPHEN 650 MG: 325 TABLET ORAL at 21:00

## 2022-01-01 RX ADMIN — METOPROLOL TARTRATE 37.5 MG: 25 TABLET, FILM COATED ORAL at 12:03

## 2022-01-01 RX ADMIN — LEVOTHYROXINE SODIUM 50 MCG: 0.05 TABLET ORAL at 05:49

## 2022-01-01 RX ADMIN — Medication 200 MCG: at 14:05

## 2022-01-01 RX ADMIN — INSULIN LISPRO 3 UNITS: 100 INJECTION, SOLUTION INTRAVENOUS; SUBCUTANEOUS at 07:08

## 2022-01-01 RX ADMIN — Medication 10 ML: at 08:16

## 2022-01-01 RX ADMIN — Medication 10 ML: at 08:32

## 2022-01-01 RX ADMIN — IPRATROPIUM BROMIDE AND ALBUTEROL SULFATE 3 ML: .5; 3 SOLUTION RESPIRATORY (INHALATION) at 18:15

## 2022-01-01 RX ADMIN — DONEPEZIL HYDROCHLORIDE 10 MG: 5 TABLET, FILM COATED ORAL at 22:01

## 2022-01-01 RX ADMIN — BUDESONIDE 0.5 MG: 0.5 INHALANT RESPIRATORY (INHALATION) at 20:16

## 2022-01-01 RX ADMIN — AMIODARONE HYDROCHLORIDE 200 MG: 200 TABLET ORAL at 08:02

## 2022-01-01 RX ADMIN — LEVOTHYROXINE SODIUM 50 MCG: 0.05 TABLET ORAL at 08:15

## 2022-01-01 RX ADMIN — AMIODARONE HYDROCHLORIDE 1 MG/MIN: 1.8 INJECTION, SOLUTION INTRAVENOUS at 08:41

## 2022-01-01 RX ADMIN — ONDANSETRON 4 MG: 2 INJECTION INTRAMUSCULAR; INTRAVENOUS at 19:31

## 2022-01-01 RX ADMIN — DOCUSATE SODIUM 100 MG: 100 CAPSULE, LIQUID FILLED ORAL at 08:29

## 2022-01-01 RX ADMIN — MIDODRINE HYDROCHLORIDE 15 MG: 5 TABLET ORAL at 17:29

## 2022-01-01 RX ADMIN — LEVOTHYROXINE SODIUM 50 MCG: 0.05 TABLET ORAL at 05:35

## 2022-01-01 RX ADMIN — CEFTRIAXONE 2 G: 2 INJECTION, POWDER, FOR SOLUTION INTRAMUSCULAR; INTRAVENOUS at 03:46

## 2022-01-01 RX ADMIN — SORBITOL SOLUTION (BULK) 50 ML: 70 SOLUTION at 09:22

## 2022-01-01 RX ADMIN — BACITRACIN ZINC, NEOMYCIN, POLYMYXIN B 1 APPLICATION: 400; 3.5; 5 OINTMENT TOPICAL at 21:48

## 2022-01-01 RX ADMIN — PRIMIDONE 25 MG: 50 TABLET ORAL at 10:14

## 2022-01-01 RX ADMIN — MIDODRINE HYDROCHLORIDE 10 MG: 5 TABLET ORAL at 12:03

## 2022-01-01 RX ADMIN — MIDODRINE HYDROCHLORIDE 15 MG: 5 TABLET ORAL at 09:45

## 2022-01-01 RX ADMIN — Medication 100 MCG: at 10:25

## 2022-01-01 RX ADMIN — PROPOFOL 25 MG: 10 INJECTION, EMULSION INTRAVENOUS at 14:06

## 2022-01-01 RX ADMIN — DONEPEZIL HYDROCHLORIDE 10 MG: 5 TABLET, FILM COATED ORAL at 21:23

## 2022-01-01 RX ADMIN — METOPROLOL TARTRATE 37.5 MG: 25 TABLET, FILM COATED ORAL at 09:13

## 2022-01-01 RX ADMIN — APIXABAN 5 MG: 5 TABLET, FILM COATED ORAL at 12:06

## 2022-01-01 RX ADMIN — METOPROLOL TARTRATE 37.5 MG: 25 TABLET, FILM COATED ORAL at 08:29

## 2022-01-01 RX ADMIN — LEVOTHYROXINE SODIUM 50 MCG: 0.05 TABLET ORAL at 05:51

## 2022-01-01 RX ADMIN — Medication 10 ML: at 20:11

## 2022-01-01 RX ADMIN — GUAIFENESIN 600 MG: 600 TABLET, EXTENDED RELEASE ORAL at 20:21

## 2022-01-01 RX ADMIN — DONEPEZIL HYDROCHLORIDE 10 MG: 5 TABLET, FILM COATED ORAL at 20:51

## 2022-01-01 RX ADMIN — INSULIN HUMAN 10 UNITS: 100 INJECTION, SUSPENSION SUBCUTANEOUS at 12:02

## 2022-01-01 RX ADMIN — APIXABAN 5 MG: 5 TABLET, FILM COATED ORAL at 09:47

## 2022-01-01 RX ADMIN — DONEPEZIL HYDROCHLORIDE 10 MG: 5 TABLET, FILM COATED ORAL at 20:54

## 2022-01-01 RX ADMIN — MIDODRINE HYDROCHLORIDE 15 MG: 5 TABLET ORAL at 12:16

## 2022-01-01 RX ADMIN — OXYCODONE 5 MG: 5 TABLET ORAL at 23:11

## 2022-01-01 RX ADMIN — SODIUM BICARBONATE 50 MEQ: 84 INJECTION, SOLUTION INTRAVENOUS at 01:12

## 2022-01-01 RX ADMIN — DONEPEZIL HYDROCHLORIDE 10 MG: 5 TABLET, FILM COATED ORAL at 21:24

## 2022-01-01 RX ADMIN — SORBITOL SOLUTION (BULK) 50 ML: 70 SOLUTION at 08:26

## 2022-01-01 RX ADMIN — GUAIFENESIN 600 MG: 600 TABLET, EXTENDED RELEASE ORAL at 09:05

## 2022-01-01 RX ADMIN — AMIODARONE HYDROCHLORIDE 150 MG: 1.5 INJECTION, SOLUTION INTRAVENOUS at 02:53

## 2022-01-01 RX ADMIN — Medication 10 ML: at 09:24

## 2022-01-01 RX ADMIN — BUDESONIDE 0.5 MG: 0.5 INHALANT RESPIRATORY (INHALATION) at 06:34

## 2022-01-01 RX ADMIN — PROPOFOL 25 MG: 10 INJECTION, EMULSION INTRAVENOUS at 14:05

## 2022-01-01 RX ADMIN — MIDODRINE HYDROCHLORIDE 15 MG: 5 TABLET ORAL at 12:23

## 2022-01-01 RX ADMIN — ATORVASTATIN CALCIUM 40 MG: 40 TABLET, FILM COATED ORAL at 22:01

## 2022-01-01 RX ADMIN — POTASSIUM CHLORIDE 40 MEQ: 1500 TABLET, EXTENDED RELEASE ORAL at 03:46

## 2022-01-01 RX ADMIN — Medication 10 ML: at 08:48

## 2022-01-01 RX ADMIN — DOCUSATE SODIUM 100 MG: 100 CAPSULE, LIQUID FILLED ORAL at 10:14

## 2022-01-01 RX ADMIN — SORBITOL SOLUTION (BULK) 30 ML: 70 SOLUTION at 05:46

## 2022-01-01 RX ADMIN — CLOPIDOGREL BISULFATE 75 MG: 75 TABLET ORAL at 08:32

## 2022-01-01 RX ADMIN — PANTOPRAZOLE SODIUM 40 MG: 40 TABLET, DELAYED RELEASE ORAL at 07:29

## 2022-01-01 RX ADMIN — METOPROLOL TARTRATE 37.5 MG: 25 TABLET, FILM COATED ORAL at 15:58

## 2022-01-01 RX ADMIN — Medication 10 ML: at 08:29

## 2022-01-01 RX ADMIN — PANTOPRAZOLE SODIUM 40 MG: 40 TABLET, DELAYED RELEASE ORAL at 06:03

## 2022-01-01 RX ADMIN — IPRATROPIUM BROMIDE AND ALBUTEROL SULFATE 3 ML: .5; 3 SOLUTION RESPIRATORY (INHALATION) at 18:48

## 2022-01-01 RX ADMIN — AMIODARONE HYDROCHLORIDE 200 MG: 200 TABLET ORAL at 08:00

## 2022-01-01 RX ADMIN — IPRATROPIUM BROMIDE AND ALBUTEROL SULFATE 3 ML: .5; 3 SOLUTION RESPIRATORY (INHALATION) at 06:46

## 2022-01-01 RX ADMIN — DONEPEZIL HYDROCHLORIDE 10 MG: 5 TABLET, FILM COATED ORAL at 20:22

## 2022-01-01 RX ADMIN — MIDODRINE HYDROCHLORIDE 10 MG: 5 TABLET ORAL at 17:48

## 2022-01-01 RX ADMIN — METOPROLOL TARTRATE 25 MG: 25 TABLET, FILM COATED ORAL at 09:45

## 2022-01-01 RX ADMIN — FUROSEMIDE 80 MG: 10 INJECTION, SOLUTION INTRAMUSCULAR; INTRAVENOUS at 15:53

## 2022-01-01 RX ADMIN — MIDODRINE HYDROCHLORIDE 15 MG: 5 TABLET ORAL at 17:19

## 2022-01-01 RX ADMIN — APIXABAN 5 MG: 5 TABLET, FILM COATED ORAL at 20:46

## 2022-01-01 RX ADMIN — METOPROLOL TARTRATE 37.5 MG: 25 TABLET, FILM COATED ORAL at 20:08

## 2022-01-01 RX ADMIN — Medication 10 ML: at 09:45

## 2022-01-01 RX ADMIN — PRIMIDONE 25 MG: 50 TABLET ORAL at 09:46

## 2022-01-01 RX ADMIN — POLYETHYLENE GLYCOL 3350 17 G: 17 POWDER, FOR SOLUTION ORAL at 20:43

## 2022-01-01 RX ADMIN — Medication 10 ML: at 21:49

## 2022-01-01 RX ADMIN — MIDODRINE HYDROCHLORIDE 10 MG: 5 TABLET ORAL at 13:31

## 2022-01-01 RX ADMIN — BUDESONIDE 0.5 MG: 0.5 INHALANT RESPIRATORY (INHALATION) at 06:40

## 2022-01-01 RX ADMIN — ATORVASTATIN CALCIUM 40 MG: 40 TABLET, FILM COATED ORAL at 08:32

## 2022-01-01 RX ADMIN — APIXABAN 5 MG: 5 TABLET, FILM COATED ORAL at 21:38

## 2022-01-01 RX ADMIN — PANTOPRAZOLE SODIUM 40 MG: 40 TABLET, DELAYED RELEASE ORAL at 06:56

## 2022-01-01 RX ADMIN — MIDODRINE HYDROCHLORIDE 10 MG: 5 TABLET ORAL at 12:01

## 2022-01-01 RX ADMIN — BARIUM SULFATE 1 TEASPOON(S): 0.6 CREAM ORAL at 16:45

## 2022-01-01 RX ADMIN — DEXTROSE MONOHYDRATE 25 G: 25 INJECTION, SOLUTION INTRAVENOUS at 19:51

## 2022-01-01 RX ADMIN — CEFTRIAXONE 2 G: 2 INJECTION, POWDER, FOR SOLUTION INTRAMUSCULAR; INTRAVENOUS at 04:02

## 2022-01-01 RX ADMIN — BUDESONIDE 0.5 MG: 0.5 INHALANT RESPIRATORY (INHALATION) at 19:01

## 2022-01-01 RX ADMIN — IPRATROPIUM BROMIDE AND ALBUTEROL SULFATE 3 ML: .5; 3 SOLUTION RESPIRATORY (INHALATION) at 18:46

## 2022-01-01 RX ADMIN — SORBITOL SOLUTION (BULK) 50 ML: 70 SOLUTION at 08:48

## 2022-01-01 RX ADMIN — ATORVASTATIN CALCIUM 40 MG: 40 TABLET, FILM COATED ORAL at 21:23

## 2022-01-01 RX ADMIN — ATORVASTATIN CALCIUM 40 MG: 40 TABLET, FILM COATED ORAL at 20:51

## 2022-01-01 RX ADMIN — SERTRALINE 50 MG: 50 TABLET, FILM COATED ORAL at 09:45

## 2022-01-01 RX ADMIN — BUDESONIDE 0.5 MG: 0.5 INHALANT RESPIRATORY (INHALATION) at 08:06

## 2022-01-01 RX ADMIN — IPRATROPIUM BROMIDE AND ALBUTEROL SULFATE 3 ML: .5; 3 SOLUTION RESPIRATORY (INHALATION) at 14:45

## 2022-01-01 RX ADMIN — BUDESONIDE 0.5 MG: 0.5 INHALANT RESPIRATORY (INHALATION) at 07:26

## 2022-01-01 RX ADMIN — ONDANSETRON 4 MG: 2 INJECTION INTRAMUSCULAR; INTRAVENOUS at 04:01

## 2022-01-01 RX ADMIN — Medication 10 ML: at 20:40

## 2022-01-01 RX ADMIN — METOPROLOL TARTRATE 37.5 MG: 25 TABLET, FILM COATED ORAL at 09:22

## 2022-01-01 RX ADMIN — SORBITOL SOLUTION (BULK) 50 ML: 70 SOLUTION at 08:02

## 2022-01-01 RX ADMIN — ATORVASTATIN CALCIUM 40 MG: 40 TABLET, FILM COATED ORAL at 20:24

## 2022-01-01 RX ADMIN — Medication 10 ML: at 20:56

## 2022-01-01 RX ADMIN — INSULIN LISPRO 4 UNITS: 100 INJECTION, SOLUTION INTRAVENOUS; SUBCUTANEOUS at 16:57

## 2022-01-01 RX ADMIN — INSULIN HUMAN 10 UNITS: 100 INJECTION, SUSPENSION SUBCUTANEOUS at 08:01

## 2022-01-01 RX ADMIN — BUDESONIDE 0.5 MG: 0.5 INHALANT RESPIRATORY (INHALATION) at 06:35

## 2022-01-01 RX ADMIN — DOCUSATE SODIUM 100 MG: 100 CAPSULE, LIQUID FILLED ORAL at 09:25

## 2022-01-01 RX ADMIN — IPRATROPIUM BROMIDE AND ALBUTEROL SULFATE 3 ML: .5; 3 SOLUTION RESPIRATORY (INHALATION) at 15:07

## 2022-01-01 RX ADMIN — CEFAZOLIN SODIUM 2 G: 1 INJECTION, POWDER, FOR SOLUTION INTRAMUSCULAR; INTRAVENOUS at 10:06

## 2022-01-01 RX ADMIN — METOPROLOL TARTRATE 37.5 MG: 25 TABLET, FILM COATED ORAL at 11:13

## 2022-01-01 RX ADMIN — ASPIRIN 81 MG: 81 TABLET, COATED ORAL at 08:02

## 2022-01-01 RX ADMIN — LEVOTHYROXINE SODIUM 50 MCG: 0.05 TABLET ORAL at 05:29

## 2022-01-01 RX ADMIN — POLYETHYLENE GLYCOL 3350 17 G: 17 POWDER, FOR SOLUTION ORAL at 08:21

## 2022-01-01 RX ADMIN — LEVOTHYROXINE SODIUM 50 MCG: 0.05 TABLET ORAL at 06:23

## 2022-01-01 RX ADMIN — Medication 1000 UNITS: at 12:03

## 2022-01-01 RX ADMIN — LEVOTHYROXINE SODIUM 50 MCG: 0.05 TABLET ORAL at 05:17

## 2022-01-01 RX ADMIN — BUDESONIDE 0.5 MG: 0.5 INHALANT RESPIRATORY (INHALATION) at 18:45

## 2022-01-01 RX ADMIN — ALUMINA, MAGNESIA, AND SIMETHICONE 15 ML: 2400; 2400; 240 SUSPENSION ORAL at 23:08

## 2022-01-01 RX ADMIN — CEFTRIAXONE 2 G: 2 INJECTION, POWDER, FOR SOLUTION INTRAMUSCULAR; INTRAVENOUS at 08:16

## 2022-01-01 RX ADMIN — DONEPEZIL HYDROCHLORIDE 10 MG: 5 TABLET, FILM COATED ORAL at 21:47

## 2022-01-01 RX ADMIN — POLYETHYLENE GLYCOL 3350 17 G: 17 POWDER, FOR SOLUTION ORAL at 09:24

## 2022-01-01 RX ADMIN — Medication 1000 UNITS: at 09:40

## 2022-01-01 RX ADMIN — CYANOCOBALAMIN TAB 1000 MCG 1000 MCG: 1000 TAB at 12:03

## 2022-01-01 RX ADMIN — GUAIFENESIN 600 MG: 600 TABLET, EXTENDED RELEASE ORAL at 20:22

## 2022-01-01 RX ADMIN — BUDESONIDE 0.25 MG: 0.5 INHALANT RESPIRATORY (INHALATION) at 19:30

## 2022-01-01 RX ADMIN — MIDODRINE HYDROCHLORIDE 10 MG: 5 TABLET ORAL at 17:17

## 2022-01-01 RX ADMIN — ATORVASTATIN CALCIUM 40 MG: 40 TABLET, FILM COATED ORAL at 21:09

## 2022-01-01 RX ADMIN — LEVOTHYROXINE SODIUM 50 MCG: 0.05 TABLET ORAL at 06:03

## 2022-01-01 RX ADMIN — SERTRALINE 50 MG: 50 TABLET, FILM COATED ORAL at 18:24

## 2022-01-01 RX ADMIN — ALBUMIN (HUMAN) 250 ML: 2.5 SOLUTION INTRAVENOUS at 04:56

## 2022-01-01 RX ADMIN — IPRATROPIUM BROMIDE AND ALBUTEROL SULFATE 3 ML: .5; 3 SOLUTION RESPIRATORY (INHALATION) at 06:44

## 2022-01-01 RX ADMIN — IPRATROPIUM BROMIDE AND ALBUTEROL SULFATE 3 ML: .5; 3 SOLUTION RESPIRATORY (INHALATION) at 07:27

## 2022-01-01 RX ADMIN — APIXABAN 5 MG: 5 TABLET, FILM COATED ORAL at 09:23

## 2022-01-01 RX ADMIN — MIDODRINE HYDROCHLORIDE 10 MG: 5 TABLET ORAL at 18:25

## 2022-01-01 RX ADMIN — BUDESONIDE 0.5 MG: 0.5 INHALANT RESPIRATORY (INHALATION) at 06:49

## 2022-01-01 RX ADMIN — CYANOCOBALAMIN TAB 1000 MCG 1000 MCG: 1000 TAB at 12:37

## 2022-01-01 RX ADMIN — IPRATROPIUM BROMIDE AND ALBUTEROL SULFATE 3 ML: .5; 3 SOLUTION RESPIRATORY (INHALATION) at 18:36

## 2022-01-01 RX ADMIN — MIDODRINE HYDROCHLORIDE 10 MG: 5 TABLET ORAL at 17:05

## 2022-01-01 RX ADMIN — FUROSEMIDE 40 MG: 10 INJECTION, SOLUTION INTRAMUSCULAR; INTRAVENOUS at 16:51

## 2022-01-01 RX ADMIN — MIDODRINE HYDROCHLORIDE 5 MG: 5 TABLET ORAL at 00:15

## 2022-01-01 RX ADMIN — APIXABAN 2.5 MG: 2.5 TABLET, FILM COATED ORAL at 12:37

## 2022-01-01 RX ADMIN — BUDESONIDE 0.5 MG: 0.5 INHALANT RESPIRATORY (INHALATION) at 18:31

## 2022-01-01 RX ADMIN — PANTOPRAZOLE SODIUM 40 MG: 40 INJECTION, POWDER, FOR SOLUTION INTRAVENOUS at 09:34

## 2022-01-01 RX ADMIN — ACETAMINOPHEN 650 MG: 325 TABLET, FILM COATED ORAL at 20:56

## 2022-01-01 RX ADMIN — MUPIROCIN 1 APPLICATION: 20 OINTMENT TOPICAL at 20:09

## 2022-01-01 RX ADMIN — Medication 10 ML: at 21:47

## 2022-01-01 RX ADMIN — POLYETHYLENE GLYCOL 3350 17 G: 17 POWDER, FOR SOLUTION ORAL at 08:48

## 2022-01-01 RX ADMIN — ATORVASTATIN CALCIUM 40 MG: 40 TABLET, FILM COATED ORAL at 17:05

## 2022-01-01 RX ADMIN — METOPROLOL TARTRATE 37.5 MG: 25 TABLET, FILM COATED ORAL at 21:01

## 2022-01-01 RX ADMIN — CYANOCOBALAMIN TAB 1000 MCG 1000 MCG: 1000 TAB at 08:00

## 2022-01-01 RX ADMIN — GUAIFENESIN 600 MG: 600 TABLET, EXTENDED RELEASE ORAL at 21:38

## 2022-01-01 RX ADMIN — INSULIN LISPRO 3 UNITS: 100 INJECTION, SOLUTION INTRAVENOUS; SUBCUTANEOUS at 12:05

## 2022-01-01 RX ADMIN — DONEPEZIL HYDROCHLORIDE 10 MG: 5 TABLET, FILM COATED ORAL at 20:00

## 2022-01-01 RX ADMIN — LORAZEPAM 1 MG: 2 INJECTION INTRAMUSCULAR; INTRAVENOUS at 13:34

## 2022-01-01 RX ADMIN — MIDODRINE HYDROCHLORIDE 10 MG: 5 TABLET ORAL at 12:06

## 2022-01-01 RX ADMIN — ALUMINA, MAGNESIA, AND SIMETHICONE 15 ML: 2400; 2400; 240 SUSPENSION ORAL at 18:54

## 2022-01-01 RX ADMIN — MIDODRINE HYDROCHLORIDE 15 MG: 5 TABLET ORAL at 09:03

## 2022-01-01 RX ADMIN — ATORVASTATIN CALCIUM 40 MG: 40 TABLET, FILM COATED ORAL at 21:57

## 2022-01-01 RX ADMIN — IPRATROPIUM BROMIDE AND ALBUTEROL SULFATE 3 ML: .5; 3 SOLUTION RESPIRATORY (INHALATION) at 07:58

## 2022-01-01 RX ADMIN — MIDODRINE HYDROCHLORIDE 15 MG: 5 TABLET ORAL at 12:48

## 2022-01-01 RX ADMIN — APIXABAN 5 MG: 5 TABLET, FILM COATED ORAL at 09:13

## 2022-01-01 RX ADMIN — DOCUSATE SODIUM 100 MG: 100 CAPSULE, LIQUID FILLED ORAL at 09:03

## 2022-01-01 RX ADMIN — CARBAMIDE PEROXIDE 6.5% 10 DROP: 6.5 LIQUID AURICULAR (OTIC) at 00:55

## 2022-01-01 RX ADMIN — IPRATROPIUM BROMIDE AND ALBUTEROL SULFATE 3 ML: .5; 3 SOLUTION RESPIRATORY (INHALATION) at 12:18

## 2022-01-01 RX ADMIN — IPRATROPIUM BROMIDE 0.5 MG: 0.5 SOLUTION RESPIRATORY (INHALATION) at 14:43

## 2022-01-01 RX ADMIN — MIDODRINE HYDROCHLORIDE 15 MG: 5 TABLET ORAL at 17:39

## 2022-01-01 RX ADMIN — CEFTRIAXONE 2 G: 2 INJECTION, POWDER, FOR SOLUTION INTRAMUSCULAR; INTRAVENOUS at 03:51

## 2022-01-01 RX ADMIN — MIDODRINE HYDROCHLORIDE 10 MG: 5 TABLET ORAL at 11:16

## 2022-01-01 RX ADMIN — INSULIN HUMAN 10 UNITS: 100 INJECTION, SUSPENSION SUBCUTANEOUS at 17:07

## 2022-01-01 RX ADMIN — Medication 10 ML: at 21:16

## 2022-01-01 RX ADMIN — GUAIFENESIN 1200 MG: 600 TABLET, EXTENDED RELEASE ORAL at 21:56

## 2022-01-01 RX ADMIN — MIDODRINE HYDROCHLORIDE 10 MG: 5 TABLET ORAL at 12:37

## 2022-01-01 RX ADMIN — Medication 10 ML: at 08:40

## 2022-01-01 RX ADMIN — IPRATROPIUM BROMIDE AND ALBUTEROL SULFATE 3 ML: .5; 3 SOLUTION RESPIRATORY (INHALATION) at 06:30

## 2022-01-01 RX ADMIN — SODIUM CHLORIDE: 0.9 INJECTION, SOLUTION INTRAVENOUS at 13:50

## 2022-01-01 RX ADMIN — DONEPEZIL HYDROCHLORIDE 10 MG: 5 TABLET, FILM COATED ORAL at 20:21

## 2022-01-01 RX ADMIN — IPRATROPIUM BROMIDE AND ALBUTEROL SULFATE 3 ML: .5; 3 SOLUTION RESPIRATORY (INHALATION) at 07:21

## 2022-01-01 RX ADMIN — AMIODARONE HYDROCHLORIDE 200 MG: 200 TABLET ORAL at 09:33

## 2022-01-01 RX ADMIN — METOPROLOL TARTRATE 37.5 MG: 25 TABLET, FILM COATED ORAL at 09:05

## 2022-01-01 RX ADMIN — HYDROCODONE BITARTRATE AND ACETAMINOPHEN 1 TABLET: 7.5; 325 TABLET ORAL at 08:32

## 2022-01-01 RX ADMIN — MIDODRINE HYDROCHLORIDE 10 MG: 5 TABLET ORAL at 11:17

## 2022-01-01 RX ADMIN — DOCUSATE SODIUM 100 MG: 100 CAPSULE, LIQUID FILLED ORAL at 12:36

## 2022-01-01 RX ADMIN — INSULIN HUMAN 10 UNITS: 100 INJECTION, SUSPENSION SUBCUTANEOUS at 18:23

## 2022-01-01 RX ADMIN — Medication 10 ML: at 08:27

## 2022-01-01 RX ADMIN — GUAIFENESIN 600 MG: 600 TABLET, EXTENDED RELEASE ORAL at 08:27

## 2022-01-01 RX ADMIN — ATORVASTATIN CALCIUM 40 MG: 40 TABLET, FILM COATED ORAL at 12:37

## 2022-01-01 RX ADMIN — SORBITOL SOLUTION (BULK) 50 ML: 70 SOLUTION at 08:32

## 2022-01-01 RX ADMIN — DONEPEZIL HYDROCHLORIDE 10 MG: 5 TABLET, FILM COATED ORAL at 21:42

## 2022-01-01 RX ADMIN — MIDODRINE HYDROCHLORIDE 10 MG: 5 TABLET ORAL at 13:13

## 2022-01-01 RX ADMIN — PROTAMINE SULFATE 30 MG: 10 INJECTION, SOLUTION INTRAVENOUS at 11:08

## 2022-01-01 RX ADMIN — HEPARIN SODIUM 5000 UNITS: 5000 INJECTION, SOLUTION INTRAVENOUS; SUBCUTANEOUS at 08:40

## 2022-01-01 RX ADMIN — GUAIFENESIN 1200 MG: 600 TABLET, EXTENDED RELEASE ORAL at 12:36

## 2022-01-01 RX ADMIN — PANTOPRAZOLE SODIUM 40 MG: 40 TABLET, DELAYED RELEASE ORAL at 06:22

## 2022-01-01 RX ADMIN — METOPROLOL TARTRATE 25 MG: 25 TABLET, FILM COATED ORAL at 09:41

## 2022-01-01 RX ADMIN — MIDODRINE HYDROCHLORIDE 10 MG: 5 TABLET ORAL at 10:13

## 2022-01-01 RX ADMIN — MIDODRINE HYDROCHLORIDE 10 MG: 5 TABLET ORAL at 10:36

## 2022-01-01 RX ADMIN — METOPROLOL TARTRATE 37.5 MG: 25 TABLET, FILM COATED ORAL at 22:00

## 2022-01-01 RX ADMIN — CARBAMIDE PEROXIDE 6.5% 10 DROP: 6.5 LIQUID AURICULAR (OTIC) at 09:07

## 2022-01-01 RX ADMIN — PANTOPRAZOLE SODIUM 40 MG: 40 TABLET, DELAYED RELEASE ORAL at 08:00

## 2022-01-01 RX ADMIN — POLYETHYLENE GLYCOL 3350 17 G: 17 POWDER, FOR SOLUTION ORAL at 08:02

## 2022-01-01 RX ADMIN — IPRATROPIUM BROMIDE AND ALBUTEROL SULFATE 3 ML: .5; 3 SOLUTION RESPIRATORY (INHALATION) at 12:09

## 2022-01-01 RX ADMIN — IPRATROPIUM BROMIDE AND ALBUTEROL SULFATE 3 ML: .5; 3 SOLUTION RESPIRATORY (INHALATION) at 15:01

## 2022-01-01 RX ADMIN — SERTRALINE 50 MG: 50 TABLET, FILM COATED ORAL at 08:28

## 2022-01-01 RX ADMIN — MIDODRINE HYDROCHLORIDE 10 MG: 5 TABLET ORAL at 17:35

## 2022-01-01 RX ADMIN — SERTRALINE 50 MG: 50 TABLET, FILM COATED ORAL at 12:03

## 2022-01-01 RX ADMIN — Medication 10 ML: at 19:32

## 2022-01-01 RX ADMIN — APIXABAN 5 MG: 5 TABLET, FILM COATED ORAL at 20:20

## 2022-01-01 RX ADMIN — PRIMIDONE 25 MG: 50 TABLET ORAL at 18:24

## 2022-01-01 RX ADMIN — PANTOPRAZOLE SODIUM 40 MG: 40 TABLET, DELAYED RELEASE ORAL at 09:13

## 2022-01-01 RX ADMIN — MAGNESIUM SULFATE 1 G: 1 INJECTION INTRAVENOUS at 08:15

## 2022-01-01 RX ADMIN — IPRATROPIUM BROMIDE 0.5 MG: 0.5 SOLUTION RESPIRATORY (INHALATION) at 20:50

## 2022-01-01 RX ADMIN — BACITRACIN ZINC, NEOMYCIN, POLYMYXIN B 1 APPLICATION: 400; 3.5; 5 OINTMENT TOPICAL at 21:47

## 2022-01-01 RX ADMIN — PANTOPRAZOLE SODIUM 40 MG: 40 TABLET, DELAYED RELEASE ORAL at 17:44

## 2022-01-01 RX ADMIN — PRIMIDONE 25 MG: 50 TABLET ORAL at 08:29

## 2022-01-01 RX ADMIN — IPRATROPIUM BROMIDE AND ALBUTEROL SULFATE 3 ML: .5; 3 SOLUTION RESPIRATORY (INHALATION) at 15:00

## 2022-01-01 RX ADMIN — Medication 10 ML: at 21:48

## 2022-01-01 RX ADMIN — GUAIFENESIN 600 MG: 600 TABLET, EXTENDED RELEASE ORAL at 22:01

## 2022-01-01 RX ADMIN — MIDODRINE HYDROCHLORIDE 10 MG: 5 TABLET ORAL at 08:32

## 2022-01-01 RX ADMIN — GABAPENTIN 100 MG: 100 CAPSULE ORAL at 21:58

## 2022-01-01 RX ADMIN — GUAIFENESIN 600 MG: 600 TABLET, EXTENDED RELEASE ORAL at 21:10

## 2022-01-01 RX ADMIN — SERTRALINE 50 MG: 50 TABLET, FILM COATED ORAL at 08:03

## 2022-01-01 RX ADMIN — HYDROCODONE BITARTRATE AND ACETAMINOPHEN 1 TABLET: 7.5; 325 TABLET ORAL at 21:42

## 2022-01-01 RX ADMIN — PRIMIDONE 25 MG: 50 TABLET ORAL at 08:32

## 2022-01-01 RX ADMIN — Medication 1000 UNITS: at 10:13

## 2022-01-01 RX ADMIN — APIXABAN 5 MG: 5 TABLET, FILM COATED ORAL at 21:08

## 2022-01-01 RX ADMIN — MIDODRINE HYDROCHLORIDE 15 MG: 5 TABLET ORAL at 08:15

## 2022-01-01 RX ADMIN — LEVOTHYROXINE SODIUM 50 MCG: 0.05 TABLET ORAL at 06:22

## 2022-01-01 RX ADMIN — CEFTRIAXONE 2 G: 2 INJECTION, POWDER, FOR SOLUTION INTRAMUSCULAR; INTRAVENOUS at 04:34

## 2022-01-01 RX ADMIN — MIDODRINE HYDROCHLORIDE 10 MG: 5 TABLET ORAL at 08:29

## 2022-01-01 RX ADMIN — DONEPEZIL HYDROCHLORIDE 10 MG: 5 TABLET, FILM COATED ORAL at 20:20

## 2022-01-01 RX ADMIN — METOPROLOL TARTRATE 25 MG: 25 TABLET, FILM COATED ORAL at 21:47

## 2022-01-01 RX ADMIN — CEFTRIAXONE 1 G: 1 INJECTION, POWDER, FOR SOLUTION INTRAMUSCULAR; INTRAVENOUS at 14:13

## 2022-01-01 RX ADMIN — MIDODRINE HYDROCHLORIDE 15 MG: 5 TABLET ORAL at 17:49

## 2022-01-01 RX ADMIN — GABAPENTIN 100 MG: 100 CAPSULE ORAL at 12:36

## 2022-01-01 RX ADMIN — MIDODRINE HYDROCHLORIDE 10 MG: 5 TABLET ORAL at 16:51

## 2022-01-01 RX ADMIN — BUDESONIDE 0.5 MG: 0.5 INHALANT RESPIRATORY (INHALATION) at 19:06

## 2022-01-01 RX ADMIN — SERTRALINE 50 MG: 50 TABLET, FILM COATED ORAL at 10:36

## 2022-01-01 RX ADMIN — MIDODRINE HYDROCHLORIDE 10 MG: 5 TABLET ORAL at 12:08

## 2022-01-01 RX ADMIN — HYDROCODONE BITARTRATE AND ACETAMINOPHEN 1 TABLET: 7.5; 325 TABLET ORAL at 09:40

## 2022-01-01 RX ADMIN — FUROSEMIDE 40 MG: 10 INJECTION, SOLUTION INTRAMUSCULAR; INTRAVENOUS at 09:24

## 2022-01-01 RX ADMIN — PANTOPRAZOLE SODIUM 40 MG: 40 INJECTION, POWDER, FOR SOLUTION INTRAVENOUS at 20:40

## 2022-01-01 RX ADMIN — Medication 10 ML: at 21:00

## 2022-01-01 RX ADMIN — PANTOPRAZOLE SODIUM 40 MG: 40 TABLET, DELAYED RELEASE ORAL at 12:06

## 2022-01-01 RX ADMIN — CYANOCOBALAMIN TAB 1000 MCG 1000 MCG: 1000 TAB at 09:33

## 2022-01-01 RX ADMIN — IPRATROPIUM BROMIDE AND ALBUTEROL SULFATE 3 ML: .5; 3 SOLUTION RESPIRATORY (INHALATION) at 19:16

## 2022-01-01 RX ADMIN — POLYETHYLENE GLYCOL 3350 17 G: 17 POWDER, FOR SOLUTION ORAL at 20:18

## 2022-01-01 RX ADMIN — Medication 200 MCG: at 14:00

## 2022-01-01 RX ADMIN — PANTOPRAZOLE SODIUM 40 MG: 40 TABLET, DELAYED RELEASE ORAL at 08:32

## 2022-01-01 RX ADMIN — ATORVASTATIN CALCIUM 40 MG: 40 TABLET, FILM COATED ORAL at 23:11

## 2022-01-01 RX ADMIN — Medication 10 ML: at 20:22

## 2022-01-01 RX ADMIN — LEVOTHYROXINE SODIUM 50 MCG: 0.05 TABLET ORAL at 08:19

## 2022-01-01 RX ADMIN — IPRATROPIUM BROMIDE AND ALBUTEROL SULFATE 3 ML: .5; 3 SOLUTION RESPIRATORY (INHALATION) at 19:44

## 2022-01-01 RX ADMIN — DONEPEZIL HYDROCHLORIDE 10 MG: 5 TABLET, FILM COATED ORAL at 20:01

## 2022-01-01 RX ADMIN — ATORVASTATIN CALCIUM 40 MG: 40 TABLET, FILM COATED ORAL at 20:46

## 2022-01-01 RX ADMIN — IPRATROPIUM BROMIDE AND ALBUTEROL SULFATE 3 ML: .5; 3 SOLUTION RESPIRATORY (INHALATION) at 08:10

## 2022-01-01 RX ADMIN — MIDAZOLAM 2 MG: 1 INJECTION INTRAMUSCULAR; INTRAVENOUS at 10:07

## 2022-01-01 RX ADMIN — MUPIROCIN 1 APPLICATION: 20 OINTMENT TOPICAL at 20:00

## 2022-01-01 RX ADMIN — IPRATROPIUM BROMIDE AND ALBUTEROL SULFATE 3 ML: .5; 3 SOLUTION RESPIRATORY (INHALATION) at 07:38

## 2022-01-01 RX ADMIN — Medication 10 ML: at 08:51

## 2022-01-01 RX ADMIN — Medication 10 ML: at 21:13

## 2022-01-01 RX ADMIN — DOCUSATE SODIUM 100 MG: 100 CAPSULE, LIQUID FILLED ORAL at 11:13

## 2022-01-01 RX ADMIN — METOPROLOL TARTRATE 37.5 MG: 25 TABLET, FILM COATED ORAL at 09:40

## 2022-01-01 RX ADMIN — PRIMIDONE 25 MG: 50 TABLET ORAL at 12:06

## 2022-01-01 RX ADMIN — SERTRALINE 50 MG: 50 TABLET, FILM COATED ORAL at 08:48

## 2022-01-01 RX ADMIN — MUPIROCIN 1 APPLICATION: 20 OINTMENT TOPICAL at 08:00

## 2022-01-01 RX ADMIN — ACETAMINOPHEN 650 MG: 325 TABLET, FILM COATED ORAL at 10:50

## 2022-01-01 RX ADMIN — INSULIN LISPRO 3 UNITS: 100 INJECTION, SOLUTION INTRAVENOUS; SUBCUTANEOUS at 17:04

## 2022-01-01 RX ADMIN — SODIUM CHLORIDE: 0.9 INJECTION, SOLUTION INTRAVENOUS at 12:53

## 2022-01-01 RX ADMIN — BACITRACIN ZINC, NEOMYCIN, POLYMYXIN B 1 APPLICATION: 400; 3.5; 5 OINTMENT TOPICAL at 20:04

## 2022-01-01 RX ADMIN — IPRATROPIUM BROMIDE 0.5 MG: 0.5 SOLUTION RESPIRATORY (INHALATION) at 19:15

## 2022-01-01 RX ADMIN — CYANOCOBALAMIN TAB 1000 MCG 1000 MCG: 1000 TAB at 10:51

## 2022-01-01 RX ADMIN — IPRATROPIUM BROMIDE AND ALBUTEROL SULFATE 3 ML: .5; 3 SOLUTION RESPIRATORY (INHALATION) at 19:08

## 2022-01-01 RX ADMIN — SERTRALINE 50 MG: 50 TABLET, FILM COATED ORAL at 08:32

## 2022-01-01 RX ADMIN — GUAIFENESIN 600 MG: 600 TABLET, EXTENDED RELEASE ORAL at 21:08

## 2022-01-01 RX ADMIN — APIXABAN 5 MG: 5 TABLET, FILM COATED ORAL at 21:23

## 2022-01-01 RX ADMIN — CLOPIDOGREL BISULFATE 75 MG: 75 TABLET ORAL at 08:17

## 2022-01-01 RX ADMIN — SERTRALINE 50 MG: 50 TABLET, FILM COATED ORAL at 08:33

## 2022-01-01 RX ADMIN — METOPROLOL TARTRATE 37.5 MG: 25 TABLET, FILM COATED ORAL at 10:36

## 2022-01-01 RX ADMIN — AMIODARONE HYDROCHLORIDE 200 MG: 200 TABLET ORAL at 08:48

## 2022-01-01 RX ADMIN — GUAIFENESIN 600 MG: 600 TABLET, EXTENDED RELEASE ORAL at 09:26

## 2022-01-01 RX ADMIN — IPRATROPIUM BROMIDE AND ALBUTEROL SULFATE 3 ML: .5; 3 SOLUTION RESPIRATORY (INHALATION) at 07:23

## 2022-01-01 RX ADMIN — ASPIRIN 81 MG: 81 TABLET, COATED ORAL at 08:15

## 2022-01-01 RX ADMIN — SERTRALINE 50 MG: 50 TABLET, FILM COATED ORAL at 17:05

## 2022-01-01 RX ADMIN — PANTOPRAZOLE SODIUM 40 MG: 40 INJECTION, POWDER, FOR SOLUTION INTRAVENOUS at 20:24

## 2022-01-01 RX ADMIN — LEVOTHYROXINE SODIUM 50 MCG: 0.05 TABLET ORAL at 09:03

## 2022-01-01 RX ADMIN — ALBUMIN (HUMAN) 25 G: 0.25 INJECTION, SOLUTION INTRAVENOUS at 11:45

## 2022-01-01 RX ADMIN — APIXABAN 5 MG: 5 TABLET, FILM COATED ORAL at 09:00

## 2022-01-01 RX ADMIN — MIDODRINE HYDROCHLORIDE 10 MG: 5 TABLET ORAL at 12:18

## 2022-01-01 RX ADMIN — Medication 10 ML: at 08:20

## 2022-01-01 RX ADMIN — PANTOPRAZOLE SODIUM 40 MG: 40 TABLET, DELAYED RELEASE ORAL at 06:15

## 2022-01-01 RX ADMIN — AMIODARONE HYDROCHLORIDE 200 MG: 200 TABLET ORAL at 09:22

## 2022-01-01 RX ADMIN — SORBITOL SOLUTION (BULK) 30 ML: 70 SOLUTION at 04:27

## 2022-01-01 RX ADMIN — INSULIN LISPRO 3 UNITS: 100 INJECTION, SOLUTION INTRAVENOUS; SUBCUTANEOUS at 17:16

## 2022-01-01 RX ADMIN — DOCUSATE SODIUM 100 MG: 100 CAPSULE, LIQUID FILLED ORAL at 08:28

## 2022-01-01 RX ADMIN — Medication 10 ML: at 08:50

## 2022-01-01 RX ADMIN — Medication 10 ML: at 09:23

## 2022-01-01 RX ADMIN — PANTOPRAZOLE SODIUM 40 MG: 40 TABLET, DELAYED RELEASE ORAL at 09:23

## 2022-01-01 RX ADMIN — IPRATROPIUM BROMIDE AND ALBUTEROL SULFATE 3 ML: .5; 3 SOLUTION RESPIRATORY (INHALATION) at 20:14

## 2022-01-01 RX ADMIN — CEFTRIAXONE 2 G: 2 INJECTION, POWDER, FOR SOLUTION INTRAMUSCULAR; INTRAVENOUS at 04:07

## 2022-01-01 RX ADMIN — PANTOPRAZOLE SODIUM 40 MG: 40 TABLET, DELAYED RELEASE ORAL at 06:33

## 2022-01-01 RX ADMIN — MIDODRINE HYDROCHLORIDE 15 MG: 5 TABLET ORAL at 16:41

## 2022-01-01 RX ADMIN — GUAIFENESIN 600 MG: 600 TABLET, EXTENDED RELEASE ORAL at 09:47

## 2022-01-01 RX ADMIN — MIDODRINE HYDROCHLORIDE 10 MG: 5 TABLET ORAL at 12:48

## 2022-01-01 RX ADMIN — MIDODRINE HYDROCHLORIDE 10 MG: 5 TABLET ORAL at 09:04

## 2022-01-01 RX ADMIN — EPHEDRINE SULFATE 20 MG: 50 INJECTION INTRAVENOUS at 13:00

## 2022-01-01 RX ADMIN — HEPARIN SODIUM 2000 UNITS: 1000 INJECTION INTRAVENOUS; SUBCUTANEOUS at 08:32

## 2022-01-01 RX ADMIN — HYDROCODONE BITARTRATE AND ACETAMINOPHEN 1 TABLET: 7.5; 325 TABLET ORAL at 20:08

## 2022-01-01 RX ADMIN — PRIMIDONE 25 MG: 50 TABLET ORAL at 09:13

## 2022-01-01 RX ADMIN — SERTRALINE 50 MG: 50 TABLET, FILM COATED ORAL at 11:12

## 2022-01-01 RX ADMIN — PANTOPRAZOLE SODIUM 40 MG: 40 TABLET, DELAYED RELEASE ORAL at 06:06

## 2022-01-01 RX ADMIN — APIXABAN 5 MG: 5 TABLET, FILM COATED ORAL at 09:03

## 2022-01-01 RX ADMIN — EPOETIN ALFA-EPBX 20000 UNITS: 20000 INJECTION, SOLUTION INTRAVENOUS; SUBCUTANEOUS at 15:34

## 2022-01-01 RX ADMIN — DONEPEZIL HYDROCHLORIDE 10 MG: 5 TABLET, FILM COATED ORAL at 21:48

## 2022-01-01 RX ADMIN — GUAIFENESIN 600 MG: 600 TABLET, EXTENDED RELEASE ORAL at 10:36

## 2022-01-01 RX ADMIN — ACETAMINOPHEN 650 MG: 325 TABLET ORAL at 09:02

## 2022-01-01 RX ADMIN — METOPROLOL TARTRATE 25 MG: 25 TABLET, FILM COATED ORAL at 20:13

## 2022-01-01 RX ADMIN — DOCUSATE SODIUM 100 MG: 100 CAPSULE, LIQUID FILLED ORAL at 08:21

## 2022-01-01 RX ADMIN — HEPARIN SODIUM 5000 UNITS: 1000 INJECTION INTRAVENOUS; SUBCUTANEOUS at 08:39

## 2022-01-01 RX ADMIN — IPRATROPIUM BROMIDE AND ALBUTEROL SULFATE 3 ML: .5; 3 SOLUTION RESPIRATORY (INHALATION) at 18:37

## 2022-01-01 RX ADMIN — DONEPEZIL HYDROCHLORIDE 10 MG: 5 TABLET, FILM COATED ORAL at 20:43

## 2022-01-01 RX ADMIN — OXYCODONE 5 MG: 5 TABLET ORAL at 12:16

## 2022-01-01 RX ADMIN — METOPROLOL TARTRATE 37.5 MG: 25 TABLET, FILM COATED ORAL at 20:21

## 2022-01-01 RX ADMIN — PRIMIDONE 25 MG: 50 TABLET ORAL at 09:40

## 2022-01-01 RX ADMIN — BUDESONIDE 0.5 MG: 0.5 INHALANT RESPIRATORY (INHALATION) at 07:42

## 2022-01-01 RX ADMIN — Medication 10 ML: at 08:19

## 2022-01-01 RX ADMIN — POTASSIUM CHLORIDE 40 MEQ: 1500 TABLET, EXTENDED RELEASE ORAL at 08:20

## 2022-01-01 RX ADMIN — IPRATROPIUM BROMIDE AND ALBUTEROL SULFATE 3 ML: .5; 3 SOLUTION RESPIRATORY (INHALATION) at 14:40

## 2022-01-01 RX ADMIN — BISACODYL 20 MG: 5 TABLET, COATED ORAL at 22:00

## 2022-01-01 RX ADMIN — PRIMIDONE 25 MG: 50 TABLET ORAL at 08:00

## 2022-01-01 RX ADMIN — MUPIROCIN 1 APPLICATION: 20 OINTMENT TOPICAL at 13:38

## 2022-01-01 RX ADMIN — DONEPEZIL HYDROCHLORIDE 10 MG: 5 TABLET, FILM COATED ORAL at 21:07

## 2022-01-11 NOTE — PAT
email request sent to brenda minaya at [facility]'s office re: eliquis hold x 48 hours  infor request sent and order sheet with preop info to [facility] [place]   Clear

## 2022-01-19 NOTE — H&P
Vascular Surgery History and Physical Update    Patient presently residing at [facility].  Overall health stable since he was seen in office on 12/28/2021.  Tolerating hemodialysis satisfactorily.  Apixaban has been on hold for 48 hours.  Vein mapping demonstrates satisfactory cephalic veins on both sides, a little larger on the left.  We elect to proceed today with creation of LEFT brachiocephalic AV fistula.  Patient agreeable.  Informed consent.

## 2022-01-20 PROBLEM — N18.6 END STAGE RENAL DISEASE: Status: ACTIVE | Noted: 2021-08-30

## 2022-01-20 PROBLEM — Z99.2: Chronic | Status: ACTIVE | Noted: 2022-01-01

## 2022-01-20 NOTE — ANESTHESIA PREPROCEDURE EVALUATION
Anesthesia Evaluation     Patient summary reviewed and Nursing notes reviewed   NPO Solid Status: > 8 hours  NPO Liquid Status: > 8 hours           Airway   Mallampati: III  TM distance: >3 FB  Dental    (+) poor dentition        Pulmonary    (+) COPD, sleep apnea on CPAP, decreased breath sounds,   Cardiovascular   Exercise tolerance: poor (<4 METS)    ECG reviewed  PT is on anticoagulation therapy  Rhythm: irregular    (+) hypertension, past MI  >12 months, CAD, CABG >6 Months, cardiac stents Drug eluting stent within the past 12 months dysrhythmias Atrial Fib, CHF , GARSIA, hyperlipidemia,     ROS comment: CARDS VISIT 9/2021   Plan  ================  Status post CABG  Patient is not having any angina pectoris or congestive heart failure     Atrial fibrillation-chronic  Rate is better controlled  Patient is on Coreg at 12.5 mg p.o. twice a day amiodarone and Cardizem.  Continue amiodarone to 200 mg once a day.     Respiratory insufficiency.-Improved     ESRD  Patient is feeling much better after dialysis was started.  Status post permacath placement 8/23/2021      Status post stent to LAD 5/24/2021.  Status post stent to SVG to RCA 5/26/2021.       Anticoagulation  Patient is on Eliquis 5 mg twice a day.  Observe for toxic effects.     Further plan will depend on patient's progress.     Neuro/Psych  (+) CVA (Rt sided weakness) residual symptoms, weakness, numbness, psychiatric history Depression, dementia,     GI/Hepatic/Renal/Endo    (+) obesity,  GERD,  renal disease ESRD and dialysis, diabetes mellitus type 2 poorly controlled using insulin,     Musculoskeletal     Abdominal   (+) obese,    Substance History - negative use     OB/GYN          Other   arthritis, blood dyscrasia anemia,   history of cancer (CATH 5/2021)    ROS/Med Hx Other: CATH 5/2021  Impression: Successful stent implantation of a drug-eluting stent in the distal portion of the SVG to the RCA and using a temporary pacemaker.   There is haziness  with about 70 to 80% disease in the distal portion of the RCA with DENIZ-3 flow before PCI and 0% with DENIZ-3 flow after PCI    TTE 7/2021:  Mitral valve is structurally normal.  Mild mitral regurgitation.  Tricuspid valve is structurally normal.  Aortic valve is structurally normal.  Pulmonic valve could not be well visualized.  No evidence for tricuspid or aortic regurgitation is seen by Doppler study.  Biatrial and biventricular enlargement is present.  Diffuse left ventricular hypocontractility with ejection fraction of 30%.  Atrial septum is intact.  Aorta is normal.  No pericardial effusion or intracardiac thrombus is seen.   Impression  Mild mitral regurgitation.  Significant biatrial and biventricular enlargement.  Diffuse left ventricular hypocontractility with ejection fraction of 30%.  No pericardial effusion or intracardiac thrombus is seen.                        Anesthesia Plan    ASA 4     MAC and regional       Anesthetic plan, all risks, benefits, and alternatives have been provided, discussed and informed consent has been obtained with: patient.    Plan discussed with CAA.        CODE STATUS:

## 2022-01-20 NOTE — DISCHARGE SUMMARY
Name: Benson Mclean ADMIT: 2022   : 1950  PCP: Samantha Zabala APRN    MRN: [id number] LOS: 0 days   AGE/SEX: 71 y.o. male  Location: [facility]     Date of Admission: 2022  Date of Discharge:  2022    PCP: Samantha Zabala APRN    DISCHARGE DIAGNOSIS  Active Hospital Problems    Diagnosis  POA   • Dependence on intermittent renal dialysis ([facility]) [Z99.2]  Not Applicable   • End stage renal disease ([facility]) [N18.6]  Yes   • Obesity [E66.9]  Yes   • Chronic obstructive pulmonary disease, unspecified ([facility]) [J44.9]  Yes   • ANNETTE treated with BiPAP [G47.33]  Yes      Resolved Hospital Problems   No resolved problems to display.     SECONDARY DIAGNOSES  Past Medical History:   Diagnosis Date   • A-fib ([facility]) 8/3/2021   • Anemia    • Appetite loss    • Arthritis    • Brain bleed ([facility])    • Carpal tunnel syndrome on left    • CHF (congestive heart failure) ([facility])    • Chronic left-sided low back pain without sciatica 2017   • CKD (chronic kidney disease) stage 3, GFR 30-59 ml/min ([facility])    • Closed fracture of seventh thoracic vertebra ([facility]) 2020   • Cognitive communication deficit 2020   • COPD (chronic obstructive pulmonary disease) ([facility])    • Coronary artery disease    • COVID-19 2021   • Cytokine release syndrome, grade 1 2021   • Depression    • Diabetic neuropathy ([facility])    • Dialysis patient ([facility])        • DM2 (diabetes mellitus, type 2) ([facility])    • GERD (gastroesophageal reflux disease)    • Hyperlipidemia    • Hypertension    • Hypogonadism in male    • Neuropathy    • Nonsustained ventricular tachycardia ([facility]) 2021   • Obesity    • Paroxysmal atrial fibrillation ([facility]) 2020   • Sleep apnea    • Stroke ([facility])    • Unsteady gait      PROCEDURES PERFORMED  Date: 2022, creation of left brachiocephalic arteriovenous fistula    HOSPITAL COURSE  Patient is a 71 y.o. male with multiple medical problems who presented to [facility] for internal AV access  "creation.  Under regional anesthesia converted to general anesthesia via laryngeal mask airway, the patient underwent the above described procedure.  There were no problems encountered.  He was given a sling to help him manage his arm, as he will be unable to move it until the regional anesthetic wears off.  He was returned to his nursing facility.  We intend no changes to his medication regimen.  The medication record provided indicates that he has hydrocodone available, and so we added no pain medication.  He may resume apixaban dosing for his p.m. dose 1/20.    VITAL SIGNS  /72 (BP Location: Right arm, Patient Position: Lying)   Pulse 97   Temp 98.1 °F (36.7 °C) (Temporal)   Resp 8   Ht 180.3 cm (71\")   Wt 96.1 kg (211 lb 14.9 oz)   SpO2 96%   BMI 29.56 kg/m²   Objective    CONDITION ON DISCHARGE  Good.    DISCHARGE DISPOSITION       DISCHARGE MEDICATIONS     Discharge Medications      Changes to Medications      Instructions Start Date   apixaban 5 MG tablet tablet  Commonly known as: ELIQUIS  What changed:   · when to take this  · additional instructions   5 mg, Oral, Every 12 Hours Scheduled      HYDROcodone-acetaminophen  MG per tablet  Commonly known as: NORCO  What changed: Another medication with the same name was removed. Continue taking this medication, and follow the directions you see here.   1 tablet, Oral, 2 Times Daily, Take preop       tiotropium 18 MCG per inhalation capsule  Commonly known as: Spiriva HandiHaler  What changed: additional instructions   1 capsule, Inhalation, Daily - RT         Continue These Medications      Instructions Start Date   albuterol (2.5 MG/3ML) 0.083% nebulizer solution  Commonly known as: PROVENTIL   2.5 mg, Nebulization, Every 4 Hours PRN, Use preop if needed       albuterol sulfate  (90 Base) MCG/ACT inhaler  Commonly known as: PROVENTIL HFA;VENTOLIN HFA;PROAIR HFA   2 puffs, Inhalation, Every 4 Hours PRN, Use preop if needed bring with    "   amiodarone 200 MG tablet  Commonly known as: PACERONE   200 mg, Oral, Daily, Take preop      aspirin 81 MG chewable tablet   81 mg, Oral, Daily, Do not take am of procedure      atorvastatin 40 MG tablet  Commonly known as: LIPITOR   40 mg, Oral, Daily, May take preop      Biofreeze 4 % gel  Generic drug: Menthol (Topical Analgesic)   1 application, Apply externally, Every 6 Hours PRN, None am of surgery       carvedilol 6.25 MG tablet  Commonly known as: COREG   6.25 mg, Oral, 2 Times Daily With Meals      cholecalciferol 25 MCG (1000 UT) tablet  Commonly known as: VITAMIN D3   1,000 Units, Oral, Daily, Stop now      dilTIAZem  MG 24 hr capsule  Commonly known as: CARDIZEM CD   240 mg, Oral, Daily      dilTIAZem  MG 24 hr capsule  Commonly known as: CARDIZEM CD   240 mg, Oral, Every 24 Hours Scheduled      docusate sodium 100 MG capsule  Commonly known as: COLACE   100 mg, Oral, Daily, Do not take am of surgery       donepezil 10 MG tablet  Commonly known as: ARICEPT   10 mg, Oral, Nightly      furosemide 80 MG tablet  Commonly known as: LASIX   80 mg, Oral, 2 Times Daily, Do not take preop      gabapentin 300 MG capsule  Commonly known as: NEURONTIN   300 mg, Oral, 2 Times Daily, Take preop      guaiFENesin 600 MG 12 hr tablet  Commonly known as: MUCINEX   1,200 mg, Oral, Every 12 Hours PRN, May take preop       hydrALAZINE 50 MG tablet  Commonly known as: APRESOLINE   50 mg, Oral, 2 times daily      insulin NPH-insulin regular (70-30) 100 UNIT/ML injection  Commonly known as: humuLIN 70/30,novoLIN 70/30   20 Units, Subcutaneous, Every Morning, None am of surgery      isosorbide mononitrate 60 MG 24 hr tablet  Commonly known as: IMDUR   60 mg, Oral, Daily, Take preop      Melatonin 3 MG capsule   3 mg, Oral, Nightly      midodrine 10 MG tablet  Commonly known as: PROAMATINE   10 mg, Oral, 3 Times Daily Before Meals, May take preop       Milk of Magnesia 400 MG/5ML suspension  Generic drug: magnesium  hydroxide   Oral, Daily PRN      Milk of Magnesia 400 MG/5ML suspension  Generic drug: magnesium hydroxide   Oral, Daily PRN      MILK OF MAGNESIA PO   1,200 mg, Oral, Daily PRN, None am of surgery       nitroglycerin 0.4 MG SL tablet  Commonly known as: NITROSTAT   0.4 mg, Sublingual, Every 5 Minutes PRN, Take no more than 3 doses in 15 minutes.       sertraline 50 MG tablet  Commonly known as: ZOLOFT   50 mg, Oral, Daily, Take preop      spironolactone 25 MG tablet  Commonly known as: ALDACTONE   25 mg, Oral, Daily, Do not take am of procedure      vitamin B-12 1000 MCG tablet  Commonly known as: CYANOCOBALAMIN   1,000 mcg, Oral, Daily, Stop now             Activity Instructions     Discharge Activity      Limit heavy lifting greater than 10 pounds for 48 hours.        Future Appointments   Date Time Provider Department Center   3/31/2022 10:40 AM Jose G Rm MD [facility]     Additional Instructions for the Follow-ups that You Need to Schedule     COVID PRE-OP / PRE-PROCEDURE SCREENING ORDER (NO ISOLATION) - Swab, Nasopharynx    Jan 18, 2022 (Approximate)      Release to patient: Immediate    Please select your location: [facility]    COVID Screening Order: Send-Out (KENAN): APTIMA PANTHER, 12-24 HR TAT [[id number]]    Previously tested for COVID-19?: Unknown    Employed in healthcare setting?: Unknown    Symptomatic for COVID-19 as defined by CDC?: Unknown    Hospitalized for COVID-19?: Unknown    Admitted to ICU for COVID-19?: Unknown    Resident in a congregate (group) care setting?: Unknown         Discharge Follow-up with Specified Provider: Dr. Davila; 2 Weeks   As directed      To: Dr. Davila    Follow Up: 2 Weeks            Follow-up Information     Samantha Zabala, FABIOLA .    Specialty: Nurse Practitioner  Contact information:  [facility], [facility] [address]  [place] [address] [phone]                       V Discharge Meds  The patient is being discharged on antiplatelet therapy  Yes  The patient is being discharged on Statin therapy Yes    [place], [facility] [employer]  Yony Davila MD  Office Number [phone]    22  11:45 EST

## 2022-01-20 NOTE — OP NOTE
Operative Note  Location: [facility]  Date of Admission:  1/20/2022  OR Date: 1/20/2022    Pre-op Diagnosis:  End-stage renal disease requiring hemodialysis    Post-op Diagnosis:  End-stage renal disease requiring hemodialysis    Procedure:   Creation of left brachiocephalic arteriovenous fistula    Surgeon: Yony Davila MD    Assistant: Rosalinda Hernandez RN    Anesthesia: Regional    Staff:   Circulator: Nelia Melchor RN; Ariela Calles RN  Scrub Person: Iman Pulliam  Assistant: Rosalinda Hernandez    Estimated Blood Loss: minimal    Specimen: None    Complications: None    Findings: Artery 5 mm with good pulse.  Vein 3.8 mm with perivenous inflammation, but vein wall not thickened.  Good thrill in fistula.  Strong arterial Doppler signals in the radial and ulnar arteries following fistula creation    Implants:   Implant Name Type Inv. Item Serial No.  Lot No. LRB No. Used Action   CLIPAPPLR M/ ENDO LIGACLIP 9 3/8IN SM - [id number] Implant CLIPAPPLR M/ ENDO LIGACLIP 9 3/8IN   [employer]  DIV OF J AND J [id number] Left 1 Implanted     Indications: 71-year-old, [facility] gentleman with end-stage renal disease, now on hemodialysis.  Has been maintained on catheter-based hemodialysis, and is referred for chronic internal access.  Evaluation has demonstrated favorable cephalic veins in the right and left antecubital areas, with good arterial flow.  Submits now for AV fistula creation.       Procedure:  A regional anesthetic was placed by members of the anesthesia team in the preoperative holding area.  He was given Kefzol IV.  He was transferred to the operating room and positioned supine the operating table.  IV sedation was administered, but the patient became unresponsive and was not emerging from the sedation as anticipated.  His end-tidal carbon oxide was climbing and so a laryngeal mask airway was placed.  The patient's left upper extremity was evaluated with B-mode ultrasound, and the  cephalic vein was seen to be patent, compressible and suitable for AV access.  The limb was then washed with Hibiclens, prepared with ChloraPrep and draped in a sterile fashion.  A transverse antecubital incision was made in the proximal forearm.  The subcutaneous tissue was dissected.  There was a bit of inflammation surrounding the vein which made venous dissection more difficult.  A few tiny but persistent bleeding points required suture repair with 6-0 Prolene.  The vein was dissected over several centimeters and marked.  The muscular fascia was incised and the distal brachial artery was isolated.  The artery had a good pulse.  The patient was given heparin 5000 units IV.  After adequate circulation time, the cephalic vein was controlled proximally and the vein was suture ligated distally.  The vein was distended with heparinized saline and was felt to be good for access.  The vein was opened for anastomosis.  The distal brachial artery was clamped proximally and distally and a longitudinal brachial arteriotomy was made.  An end-to-side anastomosis was created between the side of the distal brachial artery and the transected end of the median cubital leading to the cephalic vein.  Flushing maneuvers performed and anastomosis completed without difficulty.  Following release of clamps a good thrill was palpated in the fistula.  Good flow was present in the brachial artery proximal and distal to the anastomosis.  The configuration of the anastomosis was excellent.  There did not appear to be any cephalic tributary veins that I could identify from within the wound.  Strong arterial Doppler signals were present in the radial and ulnar arteries at the wrist.  The heparin was partially reversed with protamine.  When wound and suture line hemostasis was seen to be complete, the wound was closed in layers with Vicryl sutures.  Dermal adhesive was applied.  At its conclusion the patient had tolerated the procedure well,  and without apparent complications.  Sponge and needle counts were correct.  The patient was taken to the recovery area in stable condition.    Yony Davila MD     Date: 1/20/2022  Time: 11:39 EST

## 2022-01-20 NOTE — ANESTHESIA PROCEDURE NOTES
Peripheral Block      Patient reassessed immediately prior to procedure    Patient location during procedure: pre-op  Start time: 1/20/2022 10:05 AM  Stop time: 1/20/2022 10:08 AM  Reason for block: at surgeon's request, post-op pain management and primary anesthetic  Performed by  Anesthesiologist: Joann Grant MD  Preanesthetic Checklist  Completed: patient identified, IV checked, site marked, risks and benefits discussed, surgical consent, monitors and equipment checked, pre-op evaluation and timeout performed  Procedure    Sedation: yes  Performed under: local infiltration  Guidance:ultrasound guided    ULTRASOUND INTERPRETATION.  Using ultrasound guidance a 20 G gauge needle was placed in close proximity to the brachial plexus nerve, at which point, under ultrasound guidance anesthetic was injected in the area of the nerve and spread of the anesthesia was seen on ultrasound in close proximity thereto.  There were no abnormalities seen on ultrasound; a digital image was taken; and the patient tolerated the procedure with no complications. Images:still images obtained, printed/placed on chart    Laterality:left  Block Type:supraclavicular  Injection Technique:single-shot  Needle Type:echogenic    Sedation medications used: midazolam (VERSED) injection, 2 mg  Medications Used: mepivacaine PF (CARBOCAINE) 1 % injection, 15 mL  ropivacaine (NAROPIN) 0.5 % injection, 15 mL  Med administered at 1/20/2022 10:07 AM      Post Assessment  Injection Assessment: negative aspiration for heme, no paresthesia on injection and incremental injection  Patient Tolerance:comfortable throughout block  Complications:no

## 2022-01-20 NOTE — ANESTHESIA PROCEDURE NOTES
Airway  Urgency: elective    Date/Time: 1/20/2022 10:20 AM  End Time:1/20/2022 10:21 AM  Airway not difficult    General Information and Staff    Patient location during procedure: OR  Anesthesiologist: Joann Grant MD  CRNA: Aaron Ferreira CAA    Indications and Patient Condition  Indications for airway management: airway protection    Preoxygenated: yes  MILS not maintained throughout  Mask difficulty assessment: 0 - not attempted    Final Airway Details  Final airway type: supraglottic airway      Successful airway: LMA  Size 4    Number of attempts at approach: 1  Assessment: lips, teeth, and gum same as pre-op and atraumatic intubation    Additional Comments  Initially planned MAC sedation for case - patient stopped responding and saturations lowered to 80s despite O2, oral airway, and still maintaining respirations. Attending notified and recommended LMA placement. LMA placed with saturations ~83 and saturations recovered to 98. LMA maintained rest of case while still on propofol TIVA.

## 2022-01-20 NOTE — ANESTHESIA POSTPROCEDURE EVALUATION
Patient: Benson Mclean    Procedure Summary     Date: 01/20/22 Room / Location: [facility] OR 07 / [facility] MAIN OR    Anesthesia Start: 0956 Anesthesia Stop: 1133    Procedure: LEFT BRACHIAL CEPHALIC ARTERIAL VENOUS FISTULA CREATION (Left Arm Upper) Diagnosis:       End stage renal disease (HCC)      (End stage renal disease (HCC) [N18.6])    Surgeons: Yony Davila MD Provider: Joann Grant MD    Anesthesia Type: MAC, regional ASA Status: 4          Anesthesia Type: MAC, regional    Vitals  Vitals Value Taken Time   /77 01/20/22 1341   Temp 97.9 °F (36.6 °C) 01/20/22 1335   Pulse 96 01/20/22 1343   Resp 12 01/20/22 1335   SpO2 94 % 01/20/22 1343   Vitals shown include unvalidated device data.        Post Anesthesia Care and Evaluation    Patient location during evaluation: PACU  Patient participation: complete - patient participated  Level of consciousness: awake and alert  Pain score: 0  Pain management: adequate  Anesthetic complications: No anesthetic complications  PONV Status: none  Cardiovascular status: acceptable, hemodynamically unstable and blood pressure returned to baseline  Respiratory status: acceptable  Hydration status: acceptable

## 2022-03-07 PROBLEM — I50.9 CONGESTIVE HEART FAILURE, UNSPECIFIED HF CHRONICITY, UNSPECIFIED HEART FAILURE TYPE (HCC): Status: ACTIVE | Noted: 2022-01-01

## 2022-03-07 NOTE — H&P
[facility] Medicine Services      Patient Name: Benson Mclean  : 1950  MRN: [id number]  Primary Care Physician:  Rudolph Rooney MD  Date of admission: 3/7/2022      Subjective      Chief Complaint: Low blood pressure.    History of Present Illness: Benson Mclean is a 71 y.o. male who presented to [facility] on 3/7/2022 went for dialysis, while getting dialysis patient has episode of drop in BP, pt felt dizzy. Pt given 2 litre of fluids, now BP has improved but pt complaining of feeling some short of breath. Pt uses 3 litre of oxygen now. Denies for any chest pain, no nausea or vomiting, no abdominal pain.      Review of Systems   Constitutional: Negative.   HENT: Negative.    Eyes: Negative.    Cardiovascular: Negative.    Respiratory: Negative.    Endocrine: Negative.    Hematologic/Lymphatic: Negative.    Skin: Negative.    Musculoskeletal: Negative.    Gastrointestinal: Negative.    Genitourinary: Negative.    Neurological: Negative.    Psychiatric/Behavioral: Negative.    Allergic/Immunologic: Negative.    All other systems reviewed and are negative.       Personal History     Past Medical History:   Diagnosis Date   • A-fib ([facility]) 8/3/2021   • Anemia    • Appetite loss    • Arthritis    • Brain bleed ([facility])    • Carpal tunnel syndrome on left    • CHF (congestive heart failure) ([facility])    • Chronic left-sided low back pain without sciatica 2017   • CKD (chronic kidney disease) stage 3, GFR 30-59 ml/min ([facility])    • Closed fracture of seventh thoracic vertebra ([facility]) 2020   • Cognitive communication deficit 2020   • COPD (chronic obstructive pulmonary disease) ([facility])    • Coronary artery disease    • COVID-19 2021   • Cytokine release syndrome, grade 1 2021   • Depression    • Diabetic neuropathy ([facility])    • Dialysis patient ([facility])        • DM2 (diabetes mellitus, type 2) ([facility])    • GERD (gastroesophageal reflux disease)    •  Hyperlipidemia    • Hypertension    • Hypogonadism in male    • Neuropathy    • Nonsustained ventricular tachycardia (HCC) 7/23/2021   • Obesity    • Paroxysmal atrial fibrillation (HCC) 12/1/2020   • Sleep apnea    • Stroke (HCC)    • Unsteady gait        Past Surgical History:   Procedure Laterality Date   • ANGIOPLASTY      X2   • APPENDECTOMY     • ARTERIOVENOUS FISTULA/SHUNT SURGERY Left 1/20/2022    Procedure: LEFT BRACHIAL CEPHALIC ARTERIAL VENOUS FISTULA CREATION;  Surgeon: Yony Davila MD;  Location: [facility] MAIN OR;  Service: Vascular;  Laterality: Left;   • CARDIAC CATHETERIZATION  11/13/2015   • CARDIAC CATHETERIZATION N/A 5/24/2021    Procedure: Left Heart Cath and coronary angiogram;  Surgeon: Jose G Rm MD;  Location:  [facility] CATH INVASIVE LOCATION;  Service: Cardiovascular;  Laterality: N/A;   • CARDIAC CATHETERIZATION  5/24/2021    Procedure: Saphenous Vein Graft;  Surgeon: Jose G Rm MD;  Location: [facility] CATH INVASIVE LOCATION;  Service: Cardiovascular;;   • CARDIAC CATHETERIZATION N/A 5/24/2021    Procedure: Percutaneous Coronary Intervention;  Surgeon: Bernabe Zambrano MD;  Location: [facility] CATH INVASIVE LOCATION;  Service: Cardiology;  Laterality: N/A;   • CARDIAC CATHETERIZATION N/A 5/24/2021    Procedure: Stent NIELS coronary;  Surgeon: Bernabe Zambrano MD;  Location: [facility] CATH INVASIVE LOCATION;  Service: Cardiology;  Laterality: N/A;   • CARDIAC CATHETERIZATION N/A 5/26/2021    Procedure: Percutaneous Coronary Intervention;  Surgeon: Bernabe Zambrano MD;  Location: [facility] CATH INVASIVE LOCATION;  Service: Cardiovascular;  Laterality: N/A;   • CARDIAC CATHETERIZATION N/A 5/26/2021    Procedure: Stent NIELS bypass graft;  Surgeon: Bernabe Zambrano MD;  Location:  [facility] CATH INVASIVE LOCATION;  Service: Cardiovascular;  Laterality: N/A;   • CARDIAC ELECTROPHYSIOLOGY PROCEDURE N/A 5/24/2021    Procedure: Temporary Pacemaker;  Surgeon: Bernabe Zambrano MD;  Location: [facility] CATH  INVASIVE LOCATION;  Service: Cardiology;  Laterality: N/A;   • CARDIAC ELECTROPHYSIOLOGY PROCEDURE N/A 5/26/2021    Procedure: Temporary Pacemaker;  Surgeon: Bernabe Zambrano MD;  Location: [facility] INVASIVE LOCATION;  Service: Cardiovascular;  Laterality: N/A;   • CARPAL TUNNEL RELEASE Left 04/29/2018    carpal tunnel- lt hand// other hand surgeries    • CATARACT EXTRACTION, BILATERAL  2002    Dr. Lux Acosta   • CHOLECYSTECTOMY     • COLON RESECTION  2005   • CORONARY ANGIOPLASTY     • CORONARY ANGIOPLASTY WITH STENT PLACEMENT  11/13/2015    PTCA stent to proximal in stent and mid to distal lad   • CORONARY ANGIOPLASTY WITH STENT PLACEMENT  09/16/2016    PTCA stent to mid lad and stent to vein graft to marginal   • CORONARY ARTERY BYPASS GRAFT  2005    @ [facility]   • CYST REMOVAL      cyst removed from scrotum   • FOOT SURGERY Right 07/17/2018   • FOOT SURGERY Left 02/04/2019   • PORTACATH PLACEMENT     • TOE AMPUTATION Right     right toe removed D/T infected cut that went to the bone       Family History: family history includes Diabetes in his brother and sister; Heart disease in his father, mother, and sister; Mental illness in his brother. Otherwise pertinent FHx was reviewed and not pertinent to current issue.    Social History:  reports that he has quit smoking. His smoking use included cigarettes. He has a 60.00 pack-year smoking history. He has never used smokeless tobacco. He reports current drug use. Frequency: 1.00 time per week. Drug: Marijuana. He reports that he does not drink alcohol.    Home Medications:  Prior to Admission Medications     Prescriptions Last Dose Informant Patient Reported? Taking?    albuterol (PROVENTIL) (2.5 MG/3ML) 0.083% nebulizer solution   Yes No    Take 2.5 mg by nebulization Every 4 (Four) Hours As Needed for Wheezing. Use preop if needed    albuterol sulfate  (90 Base) MCG/ACT inhaler  Nursing Home Yes No    Inhale 2 puffs Every 4 (Four) Hours As Needed. Use preop if  needed bring with    amiodarone (PACERONE) 200 MG tablet   Yes No    Take 200 mg by mouth Daily. Take preop    apixaban (ELIQUIS) 5 MG tablet tablet   No No    Take 1 tablet by mouth Every 12 (Twelve) Hours.    Patient taking differently:  Take 5 mg by mouth Daily. Hold 48 hours before surgery    aspirin 81 MG chewable tablet  Nursing Home Yes No    Chew 81 mg Daily. Do not take am of procedure    atorvastatin (LIPITOR) 40 MG tablet  Nursing Home Yes No    Take 40 mg by mouth Daily. May take preop    bisacodyl (Dulcolax) 10 MG suppository   No No    Insert 1 suppository into the rectum Daily. As needed for constipation    carvedilol (COREG) 6.25 MG tablet   Yes No    Take 6.25 mg by mouth 2 (Two) Times a Day With Meals.    cefdinir (OMNICEF) 300 MG capsule   No No    Take 1 capsule by mouth 2 (Two) Times a Day.    cholecalciferol (VITAMIN D3) 25 MCG (1000 UT) tablet  Nursing Home Yes No    Take 1,000 Units by mouth Daily. Stop now    dilTIAZem CD (CARDIZEM CD) 240 MG 24 hr capsule   No No    Take 1 capsule by mouth Daily for 30 days.    dilTIAZem CD (CARDIZEM CD) 240 MG 24 hr capsule   Yes No    Take 240 mg by mouth Daily.    docusate sodium (COLACE) 100 MG capsule   Yes No    Take 100 mg by mouth Daily. Do not take am of surgery    donepezil (ARICEPT) 10 MG tablet  Nursing Home Yes No    Take 10 mg by mouth Every Night.    furosemide (LASIX) 80 MG tablet   Yes No    Take 80 mg by mouth 2 (Two) Times a Day. Do not take preop    gabapentin (NEURONTIN) 300 MG capsule   Yes No    Take 300 mg by mouth 2 (Two) Times a Day. Take preop    guaiFENesin (MUCINEX) 600 MG 12 hr tablet   Yes No    Take 1,200 mg by mouth Every 12 (Twelve) Hours As Needed for Cough. May take preop    hydrALAZINE (APRESOLINE) 50 MG tablet   Yes No    Take 50 mg by mouth 2 (two) times a day.    HYDROcodone-acetaminophen (NORCO)  MG per tablet   Yes No    Take 1 tablet by mouth 2 (Two) Times a Day. Take preop    insulin NPH-insulin regular  (humuLIN 70/30,novoLIN 70/30) (70-30) 100 UNIT/ML injection   Yes No    Inject 20 Units under the skin into the appropriate area as directed Every Morning. None am of surgery    isosorbide mononitrate (IMDUR) 60 MG 24 hr tablet  Nursing Home Yes No    Take 60 mg by mouth Daily. Take preop    Magnesium Hydroxide (MILK OF MAGNESIA PO)   Yes No    Take 1,200 mg by mouth Daily As Needed. None am of surgery    magnesium hydroxide (Milk of Magnesia) 400 MG/5ML suspension   Yes No    Take  by mouth Daily As Needed for Constipation.    magnesium hydroxide (Milk of Magnesia) 400 MG/5ML suspension   Yes No    Take  by mouth Daily As Needed for Constipation.    Melatonin 3 MG capsule   Yes No    Take 3 mg by mouth Every Night.    Menthol, Topical Analgesic, (Biofreeze) 4 % gel   Yes No    Apply 1 application topically Every 6 (Six) Hours As Needed. None am of surgery    midodrine (PROAMATINE) 10 MG tablet   Yes No    Take 10 mg by mouth 3 (Three) Times a Day Before Meals. May take preop    nitroglycerin (NITROSTAT) 0.4 MG SL tablet   Yes No    Place 0.4 mg under the tongue Every 5 (Five) Minutes As Needed for Chest Pain. Take no more than 3 doses in 15 minutes.    sertraline (ZOLOFT) 50 MG tablet   Yes No    Take 50 mg by mouth Daily. Take preop    spironolactone (ALDACTONE) 25 MG tablet   Yes No    Take 25 mg by mouth Daily. Do not take am of procedure    tiotropium (Spiriva HandiHaler) 18 MCG per inhalation capsule  Nursing Home No No    Place 1 capsule into inhaler and inhale Daily.    Patient taking differently:  Place 1 capsule into inhaler and inhale Daily. Use preop    vitamin B-12 (CYANOCOBALAMIN) 1000 MCG tablet  Nursing Home Yes No    Take 1,000 mcg by mouth Daily. Stop now            Allergies:  Allergies   Allergen Reactions   • Codeine Itching       Objective      Vitals:   Temp:  [97.8 °F (36.6 °C)] 97.8 °F (36.6 °C)  Heart Rate:  [] 83  Resp:  [14-18] 16  BP: (109-120)/(42-70) 119/69  Flow (L/min):  [3-4]  3    Physical Exam  Vitals and nursing note reviewed.   Constitutional:       General: He is not in acute distress.     Appearance: Normal appearance. He is well-developed. He is not ill-appearing, toxic-appearing or diaphoretic.   HENT:      Head: Normocephalic and atraumatic.      Right Ear: Ear canal and external ear normal.      Left Ear: Ear canal and external ear normal.      Nose: Nose normal. No congestion or rhinorrhea.      Mouth/Throat:      Mouth: Mucous membranes are moist.      Pharynx: No oropharyngeal exudate.   Eyes:      General: No scleral icterus.        Right eye: No discharge.         Left eye: No discharge.      Extraocular Movements: Extraocular movements intact.      Conjunctiva/sclera: Conjunctivae normal.      Pupils: Pupils are equal, round, and reactive to light.   Neck:      Thyroid: No thyromegaly.      Vascular: No carotid bruit or JVD.      Trachea: No tracheal deviation.   Cardiovascular:      Rate and Rhythm: Normal rate and regular rhythm.      Pulses: Normal pulses.      Heart sounds: Normal heart sounds. No murmur heard.    No friction rub. No gallop.   Pulmonary:      Effort: Pulmonary effort is normal. No respiratory distress.      Breath sounds: Normal breath sounds. No stridor. No wheezing, rhonchi or rales.   Chest:      Chest wall: No tenderness.   Abdominal:      General: Bowel sounds are normal. There is no distension.      Palpations: Abdomen is soft. There is no mass.      Tenderness: There is no abdominal tenderness. There is no guarding or rebound.      Hernia: No hernia is present.   Musculoskeletal:         General: No swelling, tenderness, deformity or signs of injury. Normal range of motion.      Cervical back: Normal range of motion and neck supple. No rigidity. No muscular tenderness.      Right lower leg: No edema.      Left lower leg: No edema.   Lymphadenopathy:      Cervical: No cervical adenopathy.   Skin:     General: Skin is warm and dry.       Coloration: Skin is not jaundiced or pale.      Findings: No bruising, erythema or rash.   Neurological:      General: No focal deficit present.      Mental Status: He is alert and oriented to person, place, and time. Mental status is at baseline.      Cranial Nerves: No cranial nerve deficit.      Sensory: No sensory deficit.      Motor: No weakness or abnormal muscle tone.      Coordination: Coordination normal.   Psychiatric:         Mood and Affect: Mood normal.         Behavior: Behavior normal.         Thought Content: Thought content normal.         Judgment: Judgment normal.          Result Review    Result Review:  I have personally reviewed the results from the time of this admission to 3/7/2022 17:09 EST and agree with these findings:  [x]  Laboratory  [x]  Microbiology  [x]  Radiology  []  EKG/Telemetry   []  Cardiology/Vascular   []  Pathology  []  Old records  []  Other:  Most notable findings include:       Assessment/Plan        Active Hospital Problems:  Active Hospital Problems    Diagnosis    • Congestive heart failure, unspecified HF chronicity, unspecified heart failure type (HCC)      Plan:     Home medication on reconciled, waiting on pharmacy to reconcile.    Acute on chronic systolic heart failure with EF around 30%, nephrology consult for Dialysis. Iv lasix given in ER, monitor for oxygen requirement.    Chronic respiratory failure with hypoxia requiring supplemental oxygen.     Chronic atrial fibrillation ... on metoprolol and Eliquis and cardizem     DM type 2 insulin dependent ... resume home regime, monitor accu check ac plus hs, ADA diet.    CAD .. on asa and metoprolol    DYslipiodemia ... continue statin.    Dvt prophylaxis with lovenox sc      DVT prophylaxis:  No DVT prophylaxis order currently exists.    CODE STATUS:    Level Of Support Discussed With: Patient  Code Status (Patient has no pulse and is not breathing): CPR (Attempt to Resuscitate)  Medical Interventions (Patient has  pulse or is breathing): Full Support    Admission Status:  I believe this patient meets observation status.    I discussed the patient's findings and my recommendations with patient.    This patient has been examined wearing appropriate Personal Protective Equipment and discussed with hospital infection control department. 03/07/22      Signature:

## 2022-03-07 NOTE — ED PROVIDER NOTES
Subjective   70 y/o chronically ill  male presents to the emergency room via EMS from dialysis center for persistent hypotension.  Onset: Earlier this morning  Location: Generalized weakness  Duration: Several hours  Character: Hypotension and generalized weakness  Aggravating/Alleviating Factors: Possible dialysis treatment/none  Radiation: Total body  Severity: Moderate to severe            Review of Systems   Constitutional: Positive for activity change and fatigue. Negative for chills and fever.   HENT: Negative.    Eyes: Negative.    Respiratory: Negative for cough, chest tightness and shortness of breath.    Cardiovascular: Negative for chest pain.   Gastrointestinal: Negative for abdominal pain, diarrhea, nausea and vomiting.   Endocrine: Negative.    Genitourinary: Negative for dysuria.   Musculoskeletal: Negative.    Skin: Positive for pallor.   Allergic/Immunologic: Negative.    Neurological: Positive for weakness.   Hematological: Negative.    Psychiatric/Behavioral: Negative.        Past Medical History:   Diagnosis Date   • A-fib ([facility]) 8/3/2021   • Anemia    • Appetite loss    • Arthritis    • Brain bleed ([facility])    • Carpal tunnel syndrome on left    • CHF (congestive heart failure) ([facility])    • Chronic left-sided low back pain without sciatica 12/27/2017   • CKD (chronic kidney disease) stage 3, GFR 30-59 ml/min ([facility])    • Closed fracture of seventh thoracic vertebra ([facility]) 8/23/2020   • Cognitive communication deficit 12/1/2020   • COPD (chronic obstructive pulmonary disease) ([facility])    • Coronary artery disease    • COVID-19 2/19/2021   • Cytokine release syndrome, grade 1 5/24/2021   • Depression    • Diabetic neuropathy ([facility])    • Dialysis patient ([facility])     mon wed fri   • DM2 (diabetes mellitus, type 2) ([facility])    • GERD (gastroesophageal reflux disease)    • Hyperlipidemia    • Hypertension    • Hypogonadism in male    • Neuropathy    • Nonsustained ventricular tachycardia ([facility]) 7/23/2021   •  Obesity    • Paroxysmal atrial fibrillation (HCC) 12/1/2020   • Sleep apnea    • Stroke (HCC)    • Unsteady gait        Allergies   Allergen Reactions   • Codeine Itching       Past Surgical History:   Procedure Laterality Date   • ANGIOPLASTY      X2   • APPENDECTOMY     • ARTERIOVENOUS FISTULA/SHUNT SURGERY Left 1/20/2022    Procedure: LEFT BRACHIAL CEPHALIC ARTERIAL VENOUS FISTULA CREATION;  Surgeon: Yony Davila MD;  Location: [facility] MAIN OR;  Service: Vascular;  Laterality: Left;   • CARDIAC CATHETERIZATION  11/13/2015   • CARDIAC CATHETERIZATION N/A 5/24/2021    Procedure: Left Heart Cath and coronary angiogram;  Surgeon: Jose G Rm MD;  Location:  [facility] CATH INVASIVE LOCATION;  Service: Cardiovascular;  Laterality: N/A;   • CARDIAC CATHETERIZATION  5/24/2021    Procedure: Saphenous Vein Graft;  Surgeon: Jose G Rm MD;  Location:  [facility] CATH INVASIVE LOCATION;  Service: Cardiovascular;;   • CARDIAC CATHETERIZATION N/A 5/24/2021    Procedure: Percutaneous Coronary Intervention;  Surgeon: Bernabe Zambrano MD;  Location:  [facility] CATH INVASIVE LOCATION;  Service: Cardiology;  Laterality: N/A;   • CARDIAC CATHETERIZATION N/A 5/24/2021    Procedure: Stent NIELS coronary;  Surgeon: Bernabe Zambrano MD;  Location:  [facility] C[facility] INVASIVE LOCATION;  Service: Cardiology;  Laterality: N/A;   • CARDIAC CATHETERIZATION N/A 5/26/2021    Procedure: Percutaneous Coronary Intervention;  Surgeon: Bernabe Zambrano MD;  Location:  [facility] C[facility] INVASIVE LOCATION;  Service: Cardiovascular;  Laterality: N/A;   • CARDIAC CATHETERIZATION N/A 5/26/2021    Procedure: Stent NIELS bypass graft;  Surgeon: Bernabe Zambrano MD;  Location:  [facility] C[facility] INVASIVE LOCATION;  Service: Cardiovascular;  Laterality: N/A;   • CARDIAC ELECTROPHYSIOLOGY PROCEDURE N/A 5/24/2021    Procedure: Temporary Pacemaker;  Surgeon: Bernabe Zambrano MD;  Location:  [facility] CATH INVASIVE LOCATION;  Service: Cardiology;  Laterality: N/A;   • CARDIAC  ELECTROPHYSIOLOGY PROCEDURE N/A 5/26/2021    Procedure: Temporary Pacemaker;  Surgeon: Bernabe Zambrano MD;  Location: [facility] INVASIVE LOCATION;  Service: Cardiovascular;  Laterality: N/A;   • CARPAL TUNNEL RELEASE Left 04/29/2018    carpal tunnel- lt hand// other hand surgeries    • CATARACT EXTRACTION, BILATERAL  2002    Dr. Lux Acosta   • CHOLECYSTECTOMY     • COLON RESECTION  2005   • CORONARY ANGIOPLASTY     • CORONARY ANGIOPLASTY WITH STENT PLACEMENT  11/13/2015    PTCA stent to proximal in stent and mid to distal lad   • CORONARY ANGIOPLASTY WITH STENT PLACEMENT  09/16/2016    PTCA stent to mid lad and stent to vein graft to marginal   • CORONARY ARTERY BYPASS GRAFT  2005    @ [facility]   • CYST REMOVAL      cyst removed from scrotum   • FOOT SURGERY Right 07/17/2018   • FOOT SURGERY Left 02/04/2019   • PORTACATH PLACEMENT     • TOE AMPUTATION Right     right toe removed D/T infected cut that went to the bone       Family History   Problem Relation Age of Onset   • Heart disease Mother    • Heart disease Father    • Diabetes Sister    • Heart disease Sister    • Diabetes Brother    • Mental illness Brother        Social History     Socioeconomic History   • Marital status:    Tobacco Use   • Smoking status: Former Smoker     Packs/day: 1.00     Years: 60.00     Pack years: 60.00     Types: Cigarettes   • Smokeless tobacco: Never Used   • Tobacco comment: Patient quit smoking 11/2020   Vaping Use   • Vaping Use: Never used   Substance and Sexual Activity   • Alcohol use: No   • Drug use: Yes     Frequency: 1.0 times per week     Types: Marijuana     Comment: socially   • Sexual activity: Defer           Objective   Physical Exam  Vitals and nursing note reviewed. Exam conducted with a chaperone present.   Constitutional:       Appearance: He is obese. He is ill-appearing.   HENT:      Head: Normocephalic and atraumatic.      Mouth/Throat:      Mouth: Mucous membranes are dry.      Pharynx: Oropharynx is  clear.   Eyes:      Extraocular Movements: Extraocular movements intact.      Conjunctiva/sclera: Conjunctivae normal.      Pupils: Pupils are equal, round, and reactive to light.   Cardiovascular:      Rate and Rhythm: Normal rate.      Pulses: Normal pulses.   Pulmonary:      Effort: Pulmonary effort is normal.   Abdominal:      General: There is no distension.      Palpations: Abdomen is soft.      Tenderness: There is no abdominal tenderness. There is no guarding or rebound.   Musculoskeletal:      Cervical back: Normal range of motion and neck supple.   Skin:     General: Skin is dry.      Capillary Refill: Capillary refill takes 2 to 3 seconds.      Coloration: Skin is pale.   Neurological:      Mental Status: He is alert.      Motor: Weakness present.   Psychiatric:         Mood and Affect: Mood normal.         Behavior: Behavior normal.         Procedures           ED Course      PIV obtained with difficulty.  Only right upper extremity is available for PIV due to shunt placement in JERALD ext.  Patient has right subclav temp dialysis cath present.     EKG shows a-fib at rate of 99.  Seen and signed by myself and compared to previous.  Patient has h/o a-fib.     XR Chest 1 View    Result Date: 3/12/2022  Impression: Overall similar appearance of the chest compared to the prior exam with bilateral pleural fluid collections, low lung volumes, and large cardiac silhouette. Hazy opacities in both lower lung zones could be due to atelectasis or pneumonia. Follow-up images recommended to ensure clearance. Electronically signed by:  Jacques Otto M.D.  3/12/2022 8:17 PM      Labs Reviewed   COMPREHENSIVE METABOLIC PANEL - Abnormal; Notable for the following components:       Result Value    Glucose 156 (*)     Creatinine 2.50 (*)     Sodium 134 (*)     Chloride 95 (*)     CO2 31.0 (*)     Calcium 8.5 (*)     Albumin 3.10 (*)     BUN/Creatinine Ratio 6.8 (*)     eGFR 26.8 (*)     All other components within normal  limits    Narrative:     GFR Normal >60  Chronic Kidney Disease <60  Kidney Failure <15     BNP (IN-HOUSE) - Abnormal; Notable for the following components:    proBNP >70,000.0 (*)     All other components within normal limits    Narrative:     Among patients with dyspnea, NT-proBNP is highly sensitive for the detection of acute congestive heart failure. In addition NT-proBNP of <300 pg/ml effectively rules out acute congestive heart failure with 99% negative predictive value.    Results may be falsely decreased if patient taking Biotin.     D-DIMER, QUANTITATIVE - Abnormal; Notable for the following components:    D-Dimer, Quantitative 0.98 (*)     All other components within normal limits    Narrative:     Reference Range  --------------------------------------------------------------------     < 0.50   Negative Predictive Value  0.50-0.59   Indeterminate    >= 0.60   Probable VTE             A very low percentage of patients with DVT may yield D-Dimer results   below the cut-off of 0.50 mg/L FEU.  This is known to be more   prevalent in patients with distal DVT.             Results of this test should always be interpreted in conjunction with   the patient's medical history, clinical presentation and other   findings.  Clinical diagnosis should not be based on the result of   INNOVANCE D-Dimer alone.   TROPONIN (IN-HOUSE) - Abnormal; Notable for the following components:    Troponin T 0.163 (*)     All other components within normal limits    Narrative:     Troponin T Reference Range:  <= 0.03 ng/mL-   Negative for AMI  >0.03 ng/mL-     Abnormal for myocardial necrosis.  Clinicians would have to utilize clinical acumen, EKG, Troponin and serial changes to determine if it is an Acute Myocardial Infarction or myocardial injury due to an underlying chronic condition.       Results may be falsely decreased if patient taking Biotin.     CBC WITH AUTO DIFFERENTIAL - Abnormal; Notable for the following components:     RBC 3.70 (*)     Hemoglobin 11.1 (*)     Hematocrit 35.2 (*)     MCHC 31.4 (*)     RDW 17.2 (*)     RDW-SD 58.6 (*)     Lymphocyte % 17.6 (*)     All other components within normal limits   BASIC METABOLIC PANEL - Abnormal; Notable for the following components:    Glucose 149 (*)     Creatinine 2.83 (*)     Chloride 95 (*)     CO2 33.0 (*)     Calcium 7.7 (*)     BUN/Creatinine Ratio 6.4 (*)     eGFR 23.1 (*)     All other components within normal limits    Narrative:     GFR Normal >60  Chronic Kidney Disease <60  Kidney Failure <15     CBC WITH AUTO DIFFERENTIAL - Abnormal; Notable for the following components:    RBC 3.70 (*)     Hemoglobin 11.5 (*)     Hematocrit 35.5 (*)     RDW 17.2 (*)     RDW-SD 58.2 (*)     Monocyte % 15.1 (*)     All other components within normal limits   BASIC METABOLIC PANEL - Abnormal; Notable for the following components:    Glucose 175 (*)     Creatinine 3.00 (*)     Chloride 96 (*)     CO2 32.0 (*)     Calcium 8.4 (*)     BUN/Creatinine Ratio 6.7 (*)     eGFR 21.5 (*)     All other components within normal limits    Narrative:     GFR Normal >60  Chronic Kidney Disease <60  Kidney Failure <15     CBC WITH AUTO DIFFERENTIAL - Abnormal; Notable for the following components:    RBC 3.43 (*)     Hemoglobin 10.4 (*)     Hematocrit 33.5 (*)     MCV 97.5 (*)     MCHC 31.1 (*)     RDW 17.5 (*)     RDW-SD 61.3 (*)     Lymphocyte % 15.3 (*)     Monocyte % 13.8 (*)     All other components within normal limits   BASIC METABOLIC PANEL - Abnormal; Notable for the following components:    Glucose 111 (*)     BUN 27 (*)     Creatinine 3.51 (*)     Sodium 134 (*)     Chloride 95 (*)     eGFR 17.8 (*)     All other components within normal limits    Narrative:     GFR Normal >60  Chronic Kidney Disease <60  Kidney Failure <15     CBC WITH AUTO DIFFERENTIAL - Abnormal; Notable for the following components:    RBC 3.67 (*)     Hemoglobin 10.8 (*)     Hematocrit 35.0 (*)     MCHC 31.0 (*)     RDW 17.2  (*)     RDW-SD 58.2 (*)     Lymphocyte % 17.9 (*)     Monocyte % 14.7 (*)     All other components within normal limits   POCT CREATININE - Abnormal; Notable for the following components:    Creatinine 2.50 (*)     eGFR 26.8 (*)     All other components within normal limits   POCT GLUCOSE FINGERSTICK - Abnormal; Notable for the following components:    Glucose 128 (*)     All other components within normal limits   POCT GLUCOSE FINGERSTICK - Abnormal; Notable for the following components:    Glucose 121 (*)     All other components within normal limits   POCT GLUCOSE FINGERSTICK - Abnormal; Notable for the following components:    Glucose 130 (*)     All other components within normal limits   POCT GLUCOSE FINGERSTICK - Abnormal; Notable for the following components:    Glucose 120 (*)     All other components within normal limits   POCT GLUCOSE FINGERSTICK - Abnormal; Notable for the following components:    Glucose 133 (*)     All other components within normal limits   POCT GLUCOSE FINGERSTICK - Abnormal; Notable for the following components:    Glucose 163 (*)     All other components within normal limits   BLOOD CULTURE - Normal   BLOOD CULTURE - Normal   COVID-19,CEPHEID/ROHAN,COR/[facility]/PAD/BEATRICE IN-HOUSE,NP SWAB IN TRANSPORT MEDIA 3-4 HR TAT, RT-PCR - Normal    Narrative:     Fact sheet for providers: https://www.fda.gov/media/037479/download     Fact sheet for patients: https://www.fda.gov/media/862947/download  Fact sheet for providers: https://www.fda.gov/media/053732/download    Fact sheet for patients: https://www.fda.gov/media/851792/download    Test performed by PCR.   HEPATITIS B SURFACE ANTIGEN - Normal   HEPATITIS B SURFACE ANTIBODY - Normal    Narrative:     Results may be falsely decreased if patient taking Biotin.     POCT GLUCOSE FINGERSTICK - Normal   COVID PRE-OP / PRE-PROCEDURE SCREENING ORDER (NO ISOLATION)    Narrative:     The following orders were created for panel order COVID PRE-OP /  PRE-PROCEDURE SCREENING ORDER (NO ISOLATION) - Swab, Nasopharynx.  Procedure                               Abnormality         Status                     ---------                               -----------         ------                     COVID-19,CEPHEID/ROHAN,CO...[[id number]]  Normal              Final result                 Please view results for these tests on the individual orders.   HEPATITIS B CORE ANTIBODY, TOTAL    Narrative:     Performed at:  [address]  [id number]  : Saul Irwin PhD, Phone:  [phone]   POCT GLUCOSE FINGERSTICK   POCT GLUCOSE FINGERSTICK   POCT GLUCOSE FINGERSTICK   POCT GLUCOSE FINGERSTICK   POCT GLUCOSE FINGERSTICK   POCT GLUCOSE FINGERSTICK   POCT GLUCOSE FINGERSTICK   POCT GLUCOSE FINGERSTICK   CBC AND DIFFERENTIAL    Narrative:     The following orders were created for panel order CBC & Differential.  Procedure                               Abnormality         Status                     ---------                               -----------         ------                     CBC Auto Differential[[id number]]        Abnormal            Final result                 Please view results for these tests on the individual orders.   CBC AND DIFFERENTIAL    Narrative:     The following orders were created for panel order CBC & Differential.  Procedure                               Abnormality         Status                     ---------                               -----------         ------                     CBC Auto Differential[[id number]]        Abnormal            Final result                 Please view results for these tests on the individual orders.   CBC AND DIFFERENTIAL    Narrative:     The following orders were created for panel order CBC & Differential.  Procedure                               Abnormality         Status                     ---------                               -----------         ------                      CBC Auto Differential[[id number]]        Abnormal            Final result                 Please view results for these tests on the individual orders.   CBC AND DIFFERENTIAL    Narrative:     The following orders were created for panel order CBC & Differential.  Procedure                               Abnormality         Status                     ---------                               -----------         ------                     CBC Auto Differential[[id number]]        Abnormal            Final result                 Please view results for these tests on the individual orders.       Medications   albumin human 25 % IV SOLN 25 g (has no administration in time range)   furosemide (LASIX) injection 80 mg (80 mg Intravenous Given 3/7/22 1553)   Lasix given for acute on chronic CHF, as evidenced by bilateral pleural effusions.                 Admit for markedly elevated BNP with evidence of bilateral pleural effusions.  Troponin is elevated at 0.163 and is most likely reactionary to the BNP and congestive heart failure.  D-dimer is also elevated at 0.98 but due to the renal insufficiency unable to obtain CT PE protocol to rule out PE.  Blood pressure normalizes to 109-117 over 40s to 60s.  SaO2 is 99 to 100% on 2 to 3 L nasal cannula.        Patient case discussed with hospitalist Dr. Pierre Garcia and agrees to accept to help correct congestive heart failure and to receive dialysis when scheduled.        MDM  See above  Final diagnoses:   Congestive heart failure, unspecified HF chronicity, unspecified heart failure type (HCC)   Hypotension, unspecified hypotension type       ED Disposition  ED Disposition     ED Disposition   Decision to Admit    Condition   --    Comment   Level of Care: Telemetry [5]   Admitting Physician: ULISSES TALAVERA [[id number]]   Attending Physician: ULISSES TALAVERA [[id number]]   Patient Class: Inpatient [101]               [facility] [place]  [address] [place] [address]  [phone]        Rudolph Rooney MD  [address] [address]  [phone]      one week time.    Samantha Zabala, FABIOLA  [address], [facility]  [place] [id number] [phone]      one week time.         Medication List      No changes were made to your prescriptions during this visit.          Leona Knowles, FABIOLA  03/11/22 2021       Leona Knowles, FABIOLA  03/13/22 0401

## 2022-03-08 PROBLEM — T31.0 BURN (ANY DEGREE) INVOLVING LESS THAN 10% OF BODY SURFACE: Status: ACTIVE | Noted: 2022-01-01

## 2022-03-08 NOTE — DISCHARGE PLACEMENT REQUEST
"Andrea Tobin (71 y.o. Male)             Date of Birth   1950    Social Security Number       Address   [address] [place] [address]    Home Phone   [phone]    MRN   [id number]       [facility]    Marital Status                               Admission Date   3/7/22    Admission Type   Emergency    Admitting Provider   Elzbieta Vásquez MD    Attending Provider   Elzbieta Vásquez MD    Department, Room/Bed   [facility] 2C MEDICAL INPATIENT, 251/1       Discharge Date       Discharge Disposition       Discharge Destination                               Attending Provider: Elzbieta Vásquez MD    Allergies: Codeine    Isolation: None   Infection: None   Code Status: CPR   Advance Care Planning Activity    Ht: 175.3 cm (69\")   Wt: 113 kg (249 lb 5.4 oz)    Admission Cmt: None   Principal Problem: None                Active Insurance as of 3/7/2022     Primary Coverage     Payor Plan Insurance Group Employer/Plan Group    HUMANA MEDICARE REPLACEMENT HUMANA MEDICARE REPLACEMENT [id number]     Payor Plan Address Payor Plan Phone Number Payor Plan Fax Number Effective Dates    [address] [address] [phone]  1/1/2018 - None Entered    [facility] [id number]       Subscriber Name Subscriber Birth Date Member ID       ANDREA TOBIN 1950 [id number]           Secondary Coverage     Payor Plan Insurance Group Employer/Plan Group    [place]      Payor Plan Address Payor Plan Phone Number Payor Plan Fax Number Effective Dates    [address]   5/1/2021 - None Entered    [place] [address]       Subscriber Name Subscriber Birth Date Member ID       ANDREA TOBIN 1950 [id number]                 Emergency Contacts      (Rel.) Home Phone Work Phone Mobile Phone    SHAUNA TOBIN (Spouse) [phone] -- [phone]          "

## 2022-03-08 NOTE — PLAN OF CARE
Problem: Adult Inpatient Plan of Care  Goal: Plan of Care Review  Outcome: Ongoing, Progressing  Flowsheets (Taken 3/8/2022 1457)  Progress: improving  Plan of Care Reviewed With: patient  Goal: Patient-Specific Goal (Individualized)  Outcome: Ongoing, Progressing  Goal: Absence of Hospital-Acquired Illness or Injury  Outcome: Ongoing, Progressing  Intervention: Identify and Manage Fall Risk  Recent Flowsheet Documentation  Taken 3/8/2022 0946 by Ashok Way LPN  Safety Promotion/Fall Prevention: safety round/check completed  Taken 3/8/2022 0841 by Ashok Way LPN  Safety Promotion/Fall Prevention: safety round/check completed  Taken 3/8/2022 0700 by Ashok Way LPN  Safety Promotion/Fall Prevention: safety round/check completed  Intervention: Prevent Infection  Recent Flowsheet Documentation  Taken 3/8/2022 0841 by Ashok Way LPN  Infection Prevention:   single patient room provided   hand hygiene promoted  Goal: Optimal Comfort and Wellbeing  Outcome: Ongoing, Progressing  Goal: Readiness for Transition of Care  Outcome: Ongoing, Progressing     Problem: Fall Injury Risk  Goal: Absence of Fall and Fall-Related Injury  Outcome: Ongoing, Progressing  Intervention: Identify and Manage Contributors  Recent Flowsheet Documentation  Taken 3/8/2022 0841 by Ashok Way LPN  Medication Review/Management: medications reviewed  Intervention: Promote Injury-Free Environment  Recent Flowsheet Documentation  Taken 3/8/2022 0946 by Ashok Way LPN  Safety Promotion/Fall Prevention: safety round/check completed  Taken 3/8/2022 0841 by Ashok Way LPN  Safety Promotion/Fall Prevention: safety round/check completed  Taken 3/8/2022 0700 by Ashok Way LPN  Safety Promotion/Fall Prevention: safety round/check completed     Problem: Skin Injury Risk Increased  Goal: Skin Health and Integrity  Outcome: Ongoing, Progressing   Goal Outcome Evaluation:  Plan of Care Reviewed With: patient        Progress: improving
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
## 2022-03-08 NOTE — CONSULTS
RENAL/KCC:    Referring Provider: Dr. ORONA  Reason for Consultation: ESRD    Subjective     Chief complaint: Hypotension    History of present illness:  Patient is a 72 yo WM with h/o ESRD on MWF HD who currently resides at [place].  He was sent to the ER from dialysis yesterday with severe hypotension during treatment.  He is feeling better this AM.  It is unclear if he has been receiving his Midodrine pre-treatment.    History  Past Medical History:   Diagnosis Date   • A-fib ([facility]) 8/3/2021   • Anemia    • Appetite loss    • Arthritis    • Brain bleed ([facility])    • Carpal tunnel syndrome on left    • CHF (congestive heart failure) ([facility])    • Chronic left-sided low back pain without sciatica 12/27/2017   • CKD (chronic kidney disease) stage 3, GFR 30-59 ml/min ([facility])    • Closed fracture of seventh thoracic vertebra ([facility]) 8/23/2020   • Cognitive communication deficit 12/1/2020   • COPD (chronic obstructive pulmonary disease) ([facility])    • Coronary artery disease    • COVID-19 2/19/2021   • Cytokine release syndrome, grade 1 5/24/2021   • Depression    • Diabetic neuropathy ([facility])    • Dialysis patient ([facility])     mon wed fri   • DM2 (diabetes mellitus, type 2) ([facility])    • GERD (gastroesophageal reflux disease)    • Hyperlipidemia    • Hypertension    • Hypogonadism in male    • Neuropathy    • Nonsustained ventricular tachycardia ([facility]) 7/23/2021   • Obesity    • Paroxysmal atrial fibrillation ([facility]) 12/1/2020   • Sleep apnea    • Stroke ([facility])    • Unsteady gait    ,   Past Surgical History:   Procedure Laterality Date   • ANGIOPLASTY      X2   • APPENDECTOMY     • ARTERIOVENOUS FISTULA/SHUNT SURGERY Left 1/20/2022    Procedure: LEFT BRACHIAL CEPHALIC ARTERIAL VENOUS FISTULA CREATION;  Surgeon: Yony Davila MD;  Location: [facility];  Service: Vascular;  Laterality: Left;   • CARDIAC CATHETERIZATION  11/13/2015   • CARDIAC CATHETERIZATION N/A 5/24/2021    Procedure: Left Heart Cath and coronary angiogram;   Surgeon: Jose G Rm MD;  Location:  [facility];  Service: Cardiovascular;  Laterality: N/A;   • CARDIAC CATHETERIZATION  5/24/2021    Procedure: Saphenous Vein Graft;  Surgeon: Jose G Rm MD;  Location:  [facility];  Service: Cardiovascular;;   • CARDIAC CATHETERIZATION N/A 5/24/2021    Procedure: Percutaneous Coronary Intervention;  Surgeon: Bernabe Zambrano MD;  Location:  [facility];  Service: Cardiology;  Laterality: N/A;   • CARDIAC CATHETERIZATION N/A 5/24/2021    Procedure: Stent NIELS coronary;  Surgeon: Bernabe Zambrano MD;  Location:  [facility];  Service: Cardiology;  Laterality: N/A;   • CARDIAC CATHETERIZATION N/A 5/26/2021    Procedure: Percutaneous Coronary Intervention;  Surgeon: Bernabe Zambrano MD;  Location:  [facility];  Service: Cardiovascular;  Laterality: N/A;   • CARDIAC CATHETERIZATION N/A 5/26/2021    Procedure: Stent NIELS bypass graft;  Surgeon: Bernabe Zambrano MD;  Location: [facility] CATH INVASIVE LOCATION;  Service: Cardiovascular;  Laterality: N/A;   • CARDIAC ELECTROPHYSIOLOGY PROCEDURE N/A 5/24/2021    Procedure: Temporary Pacemaker;  Surgeon: Bernabe Zambrano MD;  Location:  [facility];  Service: Cardiology;  Laterality: N/A;   • CARDIAC ELECTROPHYSIOLOGY PROCEDURE N/A 5/26/2021    Procedure: Temporary Pacemaker;  Surgeon: Bernabe Zambrano MD;  Location: [facility] CATH INVASIVE LOCATION;  Service: Cardiovascular;  Laterality: N/A;   • CARPAL TUNNEL RELEASE Left 04/29/2018    carpal tunnel- lt hand// other hand surgeries    • CATARACT EXTRACTION, BILATERAL  2002    Dr. Lxu Acosta   • CHOLECYSTECTOMY     • COLON RESECTION  2005   • CORONARY ANGIOPLASTY     • CORONARY ANGIOPLASTY WITH STENT PLACEMENT  11/13/2015    PTCA stent to proximal in stent and mid to distal lad   • CORONARY ANGIOPLASTY WITH STENT PLACEMENT  09/16/2016    PTCA stent to mid lad and stent to vein graft to marginal    • CORONARY ARTERY BYPASS GRAFT  2005    @ [facility]   • CYST REMOVAL      cyst removed from scrotum   • FOOT SURGERY Right 07/17/2018   • FOOT SURGERY Left 02/04/2019   • PORTACATH PLACEMENT     • TOE AMPUTATION Right     right toe removed D/T infected cut that went to the bone   ,   Family History   Problem Relation Age of Onset   • Heart disease Mother    • Heart disease Father    • Diabetes Sister    • Heart disease Sister    • Diabetes Brother    • Mental illness Brother    ,   Social History     Socioeconomic History   • Marital status:    Tobacco Use   • Smoking status: Former Smoker     Packs/day: 1.00     Years: 60.00     Pack years: 60.00     Types: Cigarettes   • Smokeless tobacco: Never Used   • Tobacco comment: Patient quit smoking 11/2020   Vaping Use   • Vaping Use: Never used   Substance and Sexual Activity   • Alcohol use: No   • Drug use: Yes     Frequency: 1.0 times per week     Types: Marijuana     Comment: socially   • Sexual activity: Defer     E-cigarette/Vaping   • E-cigarette/Vaping Use Never User    • Passive Exposure No    • Counseling Given No      E-cigarette/Vaping Substances   • Nicotine No    • THC No    • CBD No    • Flavoring No      E-cigarette/Vaping Devices   • Disposable No    • Pre-filled or Refillable Cartridge No    • Refillable Tank No    • Pre-filled Pod No        ,   Medications Prior to Admission   Medication Sig Dispense Refill Last Dose   • apixaban (ELIQUIS) 5 MG tablet tablet Take 1 tablet by mouth Every 12 (Twelve) Hours. (Patient taking differently: Take 5 mg by mouth Daily. Hold 48 hours before surgery) 60 tablet  3/6/2022 at Unknown time   • aspirin 81 MG chewable tablet Chew 81 mg Daily. Do not take am of procedure   3/6/2022 at Unknown time   • atorvastatin (LIPITOR) 40 MG tablet Take 40 mg by mouth Daily. May take preop   3/6/2022 at Unknown time   • bisacodyl (Dulcolax) 10 MG suppository Insert 1 suppository into the rectum Daily. As needed for constipation  5 suppository 0 3/6/2022 at Unknown time   • cholecalciferol (VITAMIN D3) 25 MCG (1000 UT) tablet Take 1,000 Units by mouth Daily. Stop now   3/6/2022 at Unknown time   • docusate sodium (COLACE) 100 MG capsule Take 100 mg by mouth Daily. Do not take am of surgery   3/6/2022 at Unknown time   • donepezil (ARICEPT) 10 MG tablet Take 10 mg by mouth Every Night.   3/6/2022 at Unknown time   • HYDROcodone-acetaminophen (NORCO)  MG per tablet Take 1 tablet by mouth 2 (Two) Times a Day. Take preop   3/6/2022 at Unknown time   • insulin NPH-insulin regular (humuLIN 70/30,novoLIN 70/30) (70-30) 100 UNIT/ML injection Inject 10 Units under the skin into the appropriate area as directed Every Morning.   3/6/2022 at Unknown time   • Melatonin 3 MG capsule Take 3 mg by mouth Every Night.   3/6/2022 at Unknown time   • midodrine (PROAMATINE) 10 MG tablet Take 10 mg by mouth 3 (Three) Times a Day Before Meals. Hold for /90   3/6/2022 at Unknown time   • neomycin-bacitracin-polymyxin (NEOSPORIN) 5-400-5000 ointment Apply 1 application topically to the appropriate area as directed Every Night. To abrasions on left and right inner thighs   3/6/2022 at Unknown time   • nitroglycerin (NITROSTAT) 0.4 MG SL tablet Place 0.4 mg under the tongue Every 5 (Five) Minutes As Needed for Chest Pain. Take no more than 3 doses in 15 minutes.   3/6/2022 at Unknown time   • primidone (MYSOLINE) 50 MG tablet Take 50 mg by mouth Daily. O.5 tablet   3/6/2022 at Unknown time   • sertraline (ZOLOFT) 50 MG tablet Take 50 mg by mouth Daily. Take preop   3/6/2022 at Unknown time   • tiotropium (Spiriva HandiHaler) 18 MCG per inhalation capsule Place 1 capsule into inhaler and inhale Daily. (Patient taking differently: Place 1 capsule into inhaler and inhale Daily. Use preop) 30 capsule 1 3/6/2022 at t   • vitamin B-12 (CYANOCOBALAMIN) 1000 MCG tablet Take 1,000 mcg by mouth Daily. Stop now   3/6/2022 at Unknown time   • albuterol (PROVENTIL)  (2.5 MG/3ML) 0.083% nebulizer solution Take 2.5 mg by nebulization Every 4 (Four) Hours As Needed for Wheezing. Use preop if needed   Unknown at Unknown time   • albuterol sulfate  (90 Base) MCG/ACT inhaler Inhale 2 puffs Every 4 (Four) Hours As Needed. Use preop if needed bring with      • amiodarone (PACERONE) 200 MG tablet Take 200 mg by mouth Daily. Take preop      • carvedilol (COREG) 6.25 MG tablet Take 6.25 mg by mouth 2 (Two) Times a Day With Meals.      • cefdinir (OMNICEF) 300 MG capsule Take 1 capsule by mouth 2 (Two) Times a Day. 14 capsule 0    • dilTIAZem CD (CARDIZEM CD) 240 MG 24 hr capsule Take 1 capsule by mouth Daily for 30 days. 30 capsule 0    • dilTIAZem CD (CARDIZEM CD) 240 MG 24 hr capsule Take 240 mg by mouth Daily.      • fluticasone (FLONASE) 50 MCG/ACT nasal spray 2 sprays into the nostril(s) as directed by provider 2 (Two) Times a Day. Both nostrils      • furosemide (LASIX) 80 MG tablet Take 80 mg by mouth 2 (Two) Times a Day. Do not take preop      • gabapentin (NEURONTIN) 100 MG capsule Take 100 mg by mouth 2 (Two) Times a Day.      • guaiFENesin (MUCINEX) 600 MG 12 hr tablet Take 1,200 mg by mouth Every 12 (Twelve) Hours As Needed for Cough. May take preop      • hydrALAZINE (APRESOLINE) 50 MG tablet Take 50 mg by mouth 2 (two) times a day.      • isosorbide mononitrate (IMDUR) 60 MG 24 hr tablet Take 60 mg by mouth Daily. Take preop      • Magnesium Hydroxide (MILK OF MAGNESIA PO) Take 1,200 mg by mouth Daily As Needed. None am of surgery   Unknown at Unknown time   • magnesium hydroxide (MILK OF MAGNESIA) 400 MG/5ML suspension Take  by mouth Daily As Needed for Constipation.   Unknown at Unknown time   • magnesium hydroxide (MILK OF MAGNESIA) 400 MG/5ML suspension Take  by mouth Daily As Needed for Constipation.   Unknown at Unknown time   • Menthol, Topical Analgesic, (Biofreeze) 4 % gel Apply 1 application topically Every 6 (Six) Hours As Needed. None am of surgery      •  metoprolol tartrate (LOPRESSOR) 25 MG tablet Take 37.5 mg by mouth 2 (Two) Times a Day. Hold for SBP<110 or HR< 60      • omeprazole (priLOSEC) 20 MG capsule Take 20 mg by mouth Daily.      • ondansetron (ZOFRAN) 4 MG tablet Take 4 mg by mouth Every 8 (Eight) Hours As Needed for Nausea or Vomiting.      • phenylephrine-shark liver oil-mineral oil-petrolatum (PREPARATION H) 0.25-3-14-71.9 % rectal ointment Insert 1 application into the rectum Daily As Needed for Hemorrhoids. suppository      • spironolactone (ALDACTONE) 25 MG tablet Take 25 mg by mouth Daily. Do not take am of procedure      , Scheduled Meds:  heparin (porcine), 5,000 Units, Subcutaneous, Q12H  midodrine, 10 mg, Oral, TID AC  sodium chloride, 10 mL, Intravenous, Q12H    , Continuous Infusions:   , PRN Meds:  •  calcium carbonate  •  [COMPLETED] Insert peripheral IV **AND** sodium chloride  •  sodium chloride and Allergies:  Codeine    Review of Systems  Pertinent items are noted in HPI    Objective     Vital Signs  Temp:  [96.7 °F (35.9 °C)-97.9 °F (36.6 °C)] 96.7 °F (35.9 °C)  Heart Rate:  [] 109  Resp:  [9-18] 16  BP: (103-120)/(42-72) 116/72    I/O this shift:  In: 480 [P.O.:480]  Out: -   I/O last 3 completed shifts:  In: 240 [P.O.:240]  Out: -     Physical Exam:  GEN: Awake, NAD  ENT: PERRL, EOMI, MMM  NECK: Supple, no JVD  CHEST: CTAB, no W/R/C  CV: RRR, no M/G/R  ABD: Soft, NT, +BS  SKIN: Warm and Dry  NEURO: CN's intact      Results Review:   I reviewed the patient's new clinical results.    WBC WBC   Date Value Ref Range Status   03/07/2022 5.40 3.40 - 10.80 10*3/mm3 Final   03/07/2022 4.30 3.40 - 10.80 10*3/mm3 Final   03/07/2022 4.90 3.40 - 10.80 10*3/mm3 Final      HGB Hemoglobin   Date Value Ref Range Status   03/07/2022 10.4 (L) 13.0 - 17.7 g/dL Final   03/07/2022 11.5 (L) 13.0 - 17.7 g/dL Final   03/07/2022 11.1 (L) 13.0 - 17.7 g/dL Final      HCT Hematocrit   Date Value Ref Range Status   03/07/2022 33.5 (L) 37.5 - 51.0 % Final    03/07/2022 35.5 (L) 37.5 - 51.0 % Final   03/07/2022 35.2 (L) 37.5 - 51.0 % Final      Platlets No results found for: LABPLAT   MCV MCV   Date Value Ref Range Status   03/07/2022 97.5 (H) 79.0 - 97.0 fL Final   03/07/2022 95.8 79.0 - 97.0 fL Final   03/07/2022 95.1 79.0 - 97.0 fL Final          Sodium Sodium   Date Value Ref Range Status   03/07/2022 136 136 - 145 mmol/L Final   03/07/2022 136 136 - 145 mmol/L Final   03/07/2022 134 (L) 136 - 145 mmol/L Final      Potassium Potassium   Date Value Ref Range Status   03/07/2022 4.1 3.5 - 5.2 mmol/L Final   03/07/2022 4.1 3.5 - 5.2 mmol/L Final   03/07/2022 3.8 3.5 - 5.2 mmol/L Final      Chloride Chloride   Date Value Ref Range Status   03/07/2022 96 (L) 98 - 107 mmol/L Final   03/07/2022 95 (L) 98 - 107 mmol/L Final   03/07/2022 95 (L) 98 - 107 mmol/L Final      CO2 CO2   Date Value Ref Range Status   03/07/2022 32.0 (H) 22.0 - 29.0 mmol/L Final   03/07/2022 33.0 (H) 22.0 - 29.0 mmol/L Final   03/07/2022 31.0 (H) 22.0 - 29.0 mmol/L Final      BUN BUN   Date Value Ref Range Status   03/07/2022 20 8 - 23 mg/dL Final   03/07/2022 18 8 - 23 mg/dL Final   03/07/2022 17 8 - 23 mg/dL Final      Creatinine Creatinine   Date Value Ref Range Status   03/07/2022 3.00 (H) 0.76 - 1.27 mg/dL Final   03/07/2022 2.83 (H) 0.76 - 1.27 mg/dL Final   03/07/2022 2.50 (H) 0.76 - 1.27 mg/dL Final   03/07/2022 2.50 (H) 0.60 - 1.30 mg/dL Final     Comment:     Serial Number: [id number]:  [id number]      Calcium Calcium   Date Value Ref Range Status   03/07/2022 8.4 (L) 8.6 - 10.5 mg/dL Final   03/07/2022 7.7 (L) 8.6 - 10.5 mg/dL Final   03/07/2022 8.5 (L) 8.6 - 10.5 mg/dL Final      PO4 No results found for: CAPO4   Albumin Albumin   Date Value Ref Range Status   03/07/2022 3.10 (L) 3.50 - 5.20 g/dL Final      Magnesium No results found for: MG   Uric Acid No results found for: URICACID         heparin (porcine), 5,000 Units, Subcutaneous, Q12H  midodrine, 10 mg, Oral, TID AC  sodium  chloride, 10 mL, Intravenous, Q12H           Assessment/Plan     1) ESRD  2) Hypotension  3) CHF  4) Anemia of CKD  5) COPD    PLAN: HD MWF.  Resume scheduled Midodrine and monitor BP.  Patient possibly interested in moving to [facility] where he can receive dialysis on site.  Will ask  to look into this possibility.  Will follow.    Yony Bhat MD   [facility]  03/08/22  @NOW

## 2022-03-08 NOTE — CASE MANAGEMENT/SOCIAL WORK
Discharge Planning Assessment   [place]     Patient Name: Benson Mclean  MRN: [id number]  Today's Date: 3/8/2022    Admit Date: 3/7/2022     Discharge Needs Assessment     Row Name 03/08/22 1414       Living Environment    People in Home facility resident    Current Living Arrangements extended care facility    Primary Care Provided by self;other (see comments)  [facility]    Provides Primary Care For no one, unable/limited ability to care for self    Family Caregiver if Needed spouse    Quality of Family Relationships helpful    Able to Return to Prior Arrangements no  patient does not want to return to [place]       Resource/Environmental Concerns    Resource/Environmental Concerns none    Transportation Concerns no car       Transition Planning    Patient/Family Anticipates Transition to inpatient rehabilitation facility;long-term care facility    Patient/Family Anticipated Services at Transition ;skilled nursing    Transportation Anticipated health plan transportation       Discharge Needs Assessment    Readmission Within the Last 30 Days no previous admission in last 30 days    Current Outpatient/Agency/Support Group skilled nursing facility;outpatient hemodialysis    Concerns to be Addressed discharge planning    Anticipated Changes Related to Illness none    Equipment Needed After Discharge wheelchair, manual    Discharge Facility/Level of Care Needs nursing facility, skilled    Provided Post Acute Provider List? Yes    Post Acute Provider List Inpatient Rehab    Delivered To Patient    Method of Delivery In person    Current Discharge Risk chronically ill;physical impairment               Discharge Plan     Row Name 03/08/22 1415       Plan    Plan [facility] pending precert. Facility to complete PAS[facility]. Will require precert. Will do in house HD.    Patient/Family in Agreement with Plan yes    Plan Comments Met with patient at bedside, reports he is living at [place] and does not  want to return on d/c. Patient is unsure where he normally does HD, upon chart review patient was previously set up at [facility]. Patient is interested in placement at [facility], referral to [facility] and patient has been accepted pending precert. PT/OT eval ordered. Informed MD patient needs Hep B panel, orders have been placed. Informed [place] liaison that patient does not want to return.  Called and discussed with spouse Melanie.   Patient normally goes to [facility], informed [place] liaison that patient will be discharged to [facility] and they offer in house HD.              Continued Care and Services - Admitted Since 3/7/2022     Destination     Service Provider Request Status Selected Services Address Phone Fax Patient Preferred    [facility] [place]  Accepted N/A [address] [place] [address] [phone] --    [facility]  Pending - Request Sent N/A [id number] [place] [address] [phone] --              Expected Discharge Date and Time     Expected Discharge Date Expected Discharge Time    Mar 9, 2022          Demographic Summary     Row Name 03/08/22 1413       General Information    Admission Type inpatient    Arrived From emergency department    Required Notices Provided Important Message from Medicare    Referral Source admission list    Reason for Consult discharge planning    Preferred Language English               Functional Status     Row Name 03/08/22 1413       Functional Status    Usual Activity Tolerance fair    Current Activity Tolerance poor       Functional Status, IADL    Medications assistive person    Meal Preparation assistive person    Housekeeping assistive person    Laundry assistive person    Shopping assistive person                    Patient Forms     Row Name 03/08/22 1417       Patient Forms    Important Message from Medicare (IMM) --  IM delivered 3/7               Met with patient in room wearing PPE: mask    Maintained distance greater than six feet and spent less than 15 minutes in the room.          Caron Rodriguez RN

## 2022-03-08 NOTE — CONSULTS
Diabetes Education    Patient Name:  Benson Mclean  YOB: 1950  MRN: [id number]  Admit Date:  3/7/2022    Pt seen due to diabetes on problem list. Last A1c in [facility] was from 7/21/2021 and result was 7.4%. Adm bs 134. Pt from [facility], [place]. Pt not wanting to return to [place]. Per CM note, awaiting precert from [facility]. Per home med list, pt receives insulin daily. Pt states nursing staff take care of giving medications and checking bs. Brother at bedside and confirmed pt will be going to nursing home. No education needed at this time.     Electronically signed by:  Lucia Márquez RN  03/08/22 17:00 EST

## 2022-03-08 NOTE — PLAN OF CARE
Goal Outcome Evaluation:  Plan of Care Reviewed With: patient        Progress: no change  Outcome Evaluation: Pt blood pressure has been stable tonight. I was able to get list of meds from [place] and updated them in the computer so MD should be able to reorder medications this morning.  Dressing changed to tunnel cath due to no biopatch or date on it. Pt doesn't like to turn side to side and often wants to be moved to his back. Has voiced no complaints.

## 2022-03-08 NOTE — DISCHARGE PLACEMENT REQUEST
"Andrea Tobin (71 y.o. Male)             Date of Birth   1950    Social Security Number       Address   [address] [place] [address]    Home Phone   [phone]    MRN   [id number]       [facility]    Marital Status                               Admission Date   3/7/22    Admission Type   Emergency    Admitting Provider   Elzbieta Vásquez MD    Attending Provider   Elzbieta Vásquez MD    Department, Room/Bed   [facility] 2C MEDICAL INPATIENT, 251/1       Discharge Date       Discharge Disposition       Discharge Destination                               Attending Provider: Elzbieta Vásquez MD    Allergies: Codeine    Isolation: None   Infection: None   Code Status: CPR   Advance Care Planning Activity    Ht: 175.3 cm (69\")   Wt: 113 kg (249 lb 5.4 oz)    Admission Cmt: None   Principal Problem: None                Active Insurance as of 3/7/2022     Primary Coverage     Payor Plan Insurance Group Employer/Plan Group    HUMANA MEDICARE REPLACEMENT HUMANA MEDICARE REPLACEMENT [id number]     Payor Plan Address Payor Plan Phone Number Payor Plan Fax Number Effective Dates    [address] [address] [phone]  1/1/2018 - None Entered    [facility] [address]       Subscriber Name Subscriber Birth Date Member ID       ANDREA TOBIN 1950 [id number]           Secondary Coverage     Payor Plan Insurance Group Employer/Plan Group    [place]      Payor Plan Address Payor Plan Phone Number Payor Plan Fax Number Effective Dates    [address]   5/1/2021 - None Entered    [place] [address]       Subscriber Name Subscriber Birth Date Member ID       ANDREA TOBIN 1950 [id number]                 Emergency Contacts      (Rel.) Home Phone Work Phone Mobile Phone    SHAUNA TOBIN (Spouse) [phone] -- [phone]              "
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
## 2022-03-09 NOTE — CASE MANAGEMENT/SOCIAL WORK
Continued Stay Note  [facility]     Patient Name: Benson Mclean  MRN: [id number]  Today's Date: 3/9/2022    Admit Date: 3/7/2022     Discharge Plan     Row Name 03/09/22 1008       Plan    Plan [facility], accepted, pending precert. Facility to complete PASRR. Precert started 3/8, pending. Will do in house HD.    Plan Comments Confirmed with [facility] precert was started yesterday on 3/8. Informed Azucena swiftison with [employer] to help obtain precert determination today. Precert pending at this time. Pharmacy updated.    Update patient at bedside, he remains agreeable to d/c plan.              Met with patient in room wearing PPE: mask    Maintained distance greater than six feet and spent less than 15 minutes in the room.          Caron Rodriguez RN

## 2022-03-09 NOTE — PLAN OF CARE
Goal Outcome Evaluation:  Plan of Care Reviewed With: patient        Progress: no change  Outcome Evaluation: pt here for CHF. patient from [place] but not wanting to return. Pt has been accecpted to [facility] - adryan on PASRR & precert. will continue to monitor pt

## 2022-03-09 NOTE — CASE MANAGEMENT/SOCIAL WORK
Continued Stay Note   [facility]     Patient Name: Benson Mclean  MRN: [id number]  Today's Date: 3/9/2022    Admit Date: 3/7/2022     Discharge Plan     Row Name 03/09/22 1436       Plan    Plan [facility], accepted pending P2P determination, due by 3/10 9:30am. Facility to complete PASRR.    Plan Comments Received notice from [facility] that precert has been denied and P2P has been offered. Notification sent to Demetria BONILLA RN to coordinate with Dr. Langford to complete P2P. Pending response. # [phone], opt 5. Due by 3/10 9:30 am.    Update 1520-- No response from Demetria BONILLA RN. Informed Dr. Vásquez via secure chat requesting P2P to be completed and provided P2P information, awaiting response.    Update 1525- Dr. Langford is not available to complete P2P today. Informed Dr. Vásquez, patient ready for d/c.               Phone communication or documentation only - no physical contact with patient or family.      Caron Rodriguez RN

## 2022-03-09 NOTE — PROGRESS NOTES
"RENAL/KCC:     LOS: 0 days    Patient Care Team:  Rudolph Rooney MD as PCP - General (Family Medicine)  Samantha Zabala APRN as PCP - Family Medicine  Jose G mR MD as Consulting Physician (Cardiology)    Chief Complaint:  ESRD, Hypotension    Subjective     Interval History:   Seen on HD.  C/O diet.    Objective     Vital Sign Min/Max for last 24 hours  Temp  Min: 97.4 °F (36.3 °C)  Max: 98.6 °F (37 °C)   BP  Min: 103/65  Max: 130/71   Pulse  Min: 95  Max: 110   Resp  Min: 16  Max: 20   SpO2  Min: 94 %  Max: 99 %   Flow (L/min)  Min: 3  Max: 3   Weight  Min: 113 kg (249 lb 1.9 oz)  Max: 113 kg (249 lb 1.9 oz)     Flowsheet Rows    Flowsheet Row First Filed Value   Admission Height 175.3 cm (69\") Documented at 03/07/2022 1044   Admission Weight 114 kg (251 lb) Documented at 03/07/2022 1044          No intake/output data recorded.  I/O last 3 completed shifts:  In: 1440 [P.O.:1440]  Out: -     Physical Exam:  GEN: Awake, NAD  ENT: PERRL, EOMI, MMM  NECK: Supple, no JVD  CHEST: CTAB, no W/R/C  CV: RRR, no M/G/R  ABD: Soft, NT, +BS  SKIN: Warm and Dry  NEURO: CN's intact      WBC WBC   Date Value Ref Range Status   03/09/2022 4.60 3.40 - 10.80 10*3/mm3 Final   03/07/2022 5.40 3.40 - 10.80 10*3/mm3 Final   03/07/2022 4.30 3.40 - 10.80 10*3/mm3 Final   03/07/2022 4.90 3.40 - 10.80 10*3/mm3 Final      HGB Hemoglobin   Date Value Ref Range Status   03/09/2022 10.8 (L) 13.0 - 17.7 g/dL Final   03/07/2022 10.4 (L) 13.0 - 17.7 g/dL Final   03/07/2022 11.5 (L) 13.0 - 17.7 g/dL Final   03/07/2022 11.1 (L) 13.0 - 17.7 g/dL Final      HCT Hematocrit   Date Value Ref Range Status   03/09/2022 35.0 (L) 37.5 - 51.0 % Final   03/07/2022 33.5 (L) 37.5 - 51.0 % Final   03/07/2022 35.5 (L) 37.5 - 51.0 % Final   03/07/2022 35.2 (L) 37.5 - 51.0 % Final      Platlets No results found for: LABPLAT   MCV MCV   Date Value Ref Range Status   03/09/2022 95.2 79.0 - 97.0 fL Final   03/07/2022 97.5 (H) 79.0 - 97.0 fL Final   03/07/2022 " 95.8 79.0 - 97.0 fL Final   03/07/2022 95.1 79.0 - 97.0 fL Final          Sodium Sodium   Date Value Ref Range Status   03/09/2022 134 (L) 136 - 145 mmol/L Final   03/07/2022 136 136 - 145 mmol/L Final   03/07/2022 136 136 - 145 mmol/L Final   03/07/2022 134 (L) 136 - 145 mmol/L Final      Potassium Potassium   Date Value Ref Range Status   03/09/2022 4.4 3.5 - 5.2 mmol/L Final   03/07/2022 4.1 3.5 - 5.2 mmol/L Final   03/07/2022 4.1 3.5 - 5.2 mmol/L Final   03/07/2022 3.8 3.5 - 5.2 mmol/L Final      Chloride Chloride   Date Value Ref Range Status   03/09/2022 95 (L) 98 - 107 mmol/L Final   03/07/2022 96 (L) 98 - 107 mmol/L Final   03/07/2022 95 (L) 98 - 107 mmol/L Final   03/07/2022 95 (L) 98 - 107 mmol/L Final      CO2 CO2   Date Value Ref Range Status   03/09/2022 28.0 22.0 - 29.0 mmol/L Final   03/07/2022 32.0 (H) 22.0 - 29.0 mmol/L Final   03/07/2022 33.0 (H) 22.0 - 29.0 mmol/L Final   03/07/2022 31.0 (H) 22.0 - 29.0 mmol/L Final      BUN BUN   Date Value Ref Range Status   03/09/2022 27 (H) 8 - 23 mg/dL Final   03/07/2022 20 8 - 23 mg/dL Final   03/07/2022 18 8 - 23 mg/dL Final   03/07/2022 17 8 - 23 mg/dL Final      Creatinine Creatinine   Date Value Ref Range Status   03/09/2022 3.51 (H) 0.76 - 1.27 mg/dL Final   03/07/2022 3.00 (H) 0.76 - 1.27 mg/dL Final   03/07/2022 2.83 (H) 0.76 - 1.27 mg/dL Final   03/07/2022 2.50 (H) 0.76 - 1.27 mg/dL Final   03/07/2022 2.50 (H) 0.60 - 1.30 mg/dL Final     Comment:     Serial Number: [id number]:  [id number]      Calcium Calcium   Date Value Ref Range Status   03/09/2022 8.6 8.6 - 10.5 mg/dL Final   03/07/2022 8.4 (L) 8.6 - 10.5 mg/dL Final   03/07/2022 7.7 (L) 8.6 - 10.5 mg/dL Final   03/07/2022 8.5 (L) 8.6 - 10.5 mg/dL Final      PO4 No results found for: CAPO4   Albumin Albumin   Date Value Ref Range Status   03/07/2022 3.10 (L) 3.50 - 5.20 g/dL Final      Magnesium No results found for: MG   Uric Acid No results found for: URICACID        Results Review:     I  reviewed the patient's new clinical results.    atorvastatin, 40 mg, Oral, Daily  donepezil, 10 mg, Oral, Nightly  gabapentin, 100 mg, Oral, BID  heparin (porcine), 5,000 Units, Subcutaneous, Q12H  HYDROcodone-acetaminophen, 1 tablet, Oral, BID  insulin NPH-insulin regular, 10 Units, Subcutaneous, Daily  ipratropium, 0.5 mg, Nebulization, 4x Daily - RT  metoprolol tartrate, 37.5 mg, Oral, BID  midodrine, 10 mg, Oral, TID AC  mupirocin, 1 application, Topical, Q12H  pantoprazole, 40 mg, Oral, QAM  primidone, 25 mg, Oral, Daily  sertraline, 50 mg, Oral, Daily  sodium chloride, 10 mL, Intravenous, Q12H           Medication Review: Reviewed    Assessment/Plan     1) ESRD  2) Hypotension  3) CHF  4) Anemia of CKD  5) COPD    Plan: Seen on HD.  Continue MWF schedule.  Working on transition to [facility] where he can receive dialysis on site.  BP stable on Midodrine.    Yony Bhat MD   [facility]  03/09/22  13:14 EST
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
## 2022-03-10 NOTE — PLAN OF CARE
Goal Outcome Evaluation:  Plan of Care Reviewed With: patient           Outcome Evaluation: Pt has been up on and off throughout the night, c/o RLE pain earlier in the shift gave scheduled Norco. Currently waiting on placement details with [facility], will continue to monitor.

## 2022-03-10 NOTE — THERAPY TREATMENT NOTE
Subjective: Pt agreeable to therapeutic plan of care.    Objective:     Bed mobility - Mod-A, Max-A and Assist x 2  Transfers - Max-A, Assist x 2 and with rolling walker; unable to complete  Patient sat EOB for about 5 minutes, min - CGA needed due to leaning to R and anteriorly.   AAROM BLE in supine 5-10 reps    Pain: 4 VAS  Education: Provided education on importance of mobility and skilled verbal / tactile cueing throughout intervention.     Assessment: Benson Mclean presents with functional mobility impairments which indicate the need for skilled intervention. Patient required mod/max A of 2 for supine <>sit transfers; while sitting EOB ,needed min/CGA to maintain upright position due to leaning to R and anteriorly at times. Attempted to stand x 2 without success. Patient was ambulatory about 1 month ago per his report. Tolerating session today without incident. Will continue to follow and progress as tolerated.     Plan/Recommendations:   Pt would benefit from Inpatient Rehabilitation placement at discharge from facility and requires no DME at discharge.   Pt desires Inpatient Rehabilitation placement at discharge. Pt cooperative; agreeable to therapeutic recommendations and plan of care.     Basic Mobility 6-click:  Rollin = Total, A lot = 2, A little = 3; 4 = None  Supine>Sit:   1 = Total, A lot = 2, A little = 3; 4 = None   Sit>Stand with arms:  1 = Total, A lot = 2, A little = 3; 4 = None  Bed>Chair:   1 = Total, A lot = 2, A little = 3; 4 = None  Ambulate in room:  1 = Total, A lot = 2, A little = 3; 4 = None  3-5 Steps with railin = Total, A lot = 2, A little = 3; 4 = None  Score: 8      Post-Tx Position: Supine with HOB Elevated, Alarms activated and Call light and personal items within reach  PPE: gloves, surgical mask, eyewear protection

## 2022-03-10 NOTE — CASE MANAGEMENT/SOCIAL WORK
Continued Stay Note  DOREEN Brennan     Patient Name: Benson Mclean  MRN: [id number]  Today's Date: 3/10/2022    Admit Date: 3/7/2022     Discharge Plan     Row Name 03/10/22 1503       Plan    Plan [facility], accepted. Facility to complete PASRR. Precert P2P denied, will admit under [place].    Plan Comments Dr. Vásquez completed P2P and denial was upheld. [facility] estrada informed. Updated patient at bedside, he is agreeable to admit to  under [place] Medicaid. Called and informed wife, she is going to work with [facility] on filing an appeal. Eden confirmed patient can still admit today under [place] Medicaid and they will work with wife on filing an appeal. Updated MD and RN. Discharge orders noted.    Final Discharge Disposition Code 04 - intermediate care facility    Final Note [facility] with in house HD, admitted under [place].              Met with patient in room wearing PPE: mask    Maintained distance greater than six feet and spent less than 15 minutes in the room.        Caron Rodriguez RN

## 2022-03-10 NOTE — PLAN OF CARE
Goal Outcome Evaluation:  Plan of Care Reviewed With: patient           Outcome Evaluation: Pt is a 72 YO M admitted s/p hyptension following dialysis. Pt states he currently resides at [facility], but is hoping to transfer due to feeling he is not getting appropriate amount of therapy. Pt states at baseline he is using a lift to transfer to from w/c, but when he entered [facility] he was able to stand and turn.  At rehab he has been getting assist for all ADLs, which is a major decline from last year when patient was living at home. He demos general weakness and due to signficant LE weakness he is unable to come to  the least.  He has difficulty feeding and completing other ADLs due to weakness and tremors in BUE.  He participates well and is motivated to improve.  He will require IP rehab at discharge.

## 2022-03-10 NOTE — PROGRESS NOTES
"RENAL/KCC:     LOS: 1 day    Patient Care Team:  Rudolph Rooney MD as PCP - General (Family Medicine)  Samantha Zabala APRN as PCP - Family Medicine  Jose G Rm MD as Consulting Physician (Cardiology)    Chief Complaint:  ESRD, Hypotension    Subjective     Interval History:   C/O diet still.    Objective     Vital Sign Min/Max for last 24 hours  Temp  Min: 97.5 °F (36.4 °C)  Max: 98 °F (36.7 °C)   BP  Min: 104/73  Max: 112/68   Pulse  Min: 77  Max: 95   Resp  Min: 17  Max: 20   SpO2  Min: 95 %  Max: 100 %   Flow (L/min)  Min: 3  Max: 3   No data recorded     Flowsheet Rows    Flowsheet Row First Filed Value   Admission Height 175.3 cm (69\") Documented at 03/07/2022 1044   Admission Weight 114 kg (251 lb) Documented at 03/07/2022 1044          No intake/output data recorded.  I/O last 3 completed shifts:  In: 960 [P.O.:960]  Out: 1779 [Other:1779]    Physical Exam:  GEN: Awake, NAD  ENT: PERRL, EOMI, MMM  NECK: Supple, no JVD  CHEST: CTAB, no W/R/C  CV: RRR, no M/G/R  ABD: Soft, NT, +BS  SKIN: Warm and Dry  NEURO: CN's intact      WBC WBC   Date Value Ref Range Status   03/09/2022 4.60 3.40 - 10.80 10*3/mm3 Final   03/07/2022 5.40 3.40 - 10.80 10*3/mm3 Final   03/07/2022 4.30 3.40 - 10.80 10*3/mm3 Final   03/07/2022 4.90 3.40 - 10.80 10*3/mm3 Final      HGB Hemoglobin   Date Value Ref Range Status   03/09/2022 10.8 (L) 13.0 - 17.7 g/dL Final   03/07/2022 10.4 (L) 13.0 - 17.7 g/dL Final   03/07/2022 11.5 (L) 13.0 - 17.7 g/dL Final   03/07/2022 11.1 (L) 13.0 - 17.7 g/dL Final      HCT Hematocrit   Date Value Ref Range Status   03/09/2022 35.0 (L) 37.5 - 51.0 % Final   03/07/2022 33.5 (L) 37.5 - 51.0 % Final   03/07/2022 35.5 (L) 37.5 - 51.0 % Final   03/07/2022 35.2 (L) 37.5 - 51.0 % Final      Platlets No results found for: LABPLAT   MCV MCV   Date Value Ref Range Status   03/09/2022 95.2 79.0 - 97.0 fL Final   03/07/2022 97.5 (H) 79.0 - 97.0 fL Final   03/07/2022 95.8 79.0 - 97.0 fL Final   03/07/2022 95.1 " 79.0 - 97.0 fL Final          Sodium Sodium   Date Value Ref Range Status   03/09/2022 134 (L) 136 - 145 mmol/L Final   03/07/2022 136 136 - 145 mmol/L Final   03/07/2022 136 136 - 145 mmol/L Final   03/07/2022 134 (L) 136 - 145 mmol/L Final      Potassium Potassium   Date Value Ref Range Status   03/09/2022 4.4 3.5 - 5.2 mmol/L Final   03/07/2022 4.1 3.5 - 5.2 mmol/L Final   03/07/2022 4.1 3.5 - 5.2 mmol/L Final   03/07/2022 3.8 3.5 - 5.2 mmol/L Final      Chloride Chloride   Date Value Ref Range Status   03/09/2022 95 (L) 98 - 107 mmol/L Final   03/07/2022 96 (L) 98 - 107 mmol/L Final   03/07/2022 95 (L) 98 - 107 mmol/L Final   03/07/2022 95 (L) 98 - 107 mmol/L Final      CO2 CO2   Date Value Ref Range Status   03/09/2022 28.0 22.0 - 29.0 mmol/L Final   03/07/2022 32.0 (H) 22.0 - 29.0 mmol/L Final   03/07/2022 33.0 (H) 22.0 - 29.0 mmol/L Final   03/07/2022 31.0 (H) 22.0 - 29.0 mmol/L Final      BUN BUN   Date Value Ref Range Status   03/09/2022 27 (H) 8 - 23 mg/dL Final   03/07/2022 20 8 - 23 mg/dL Final   03/07/2022 18 8 - 23 mg/dL Final   03/07/2022 17 8 - 23 mg/dL Final      Creatinine Creatinine   Date Value Ref Range Status   03/09/2022 3.51 (H) 0.76 - 1.27 mg/dL Final   03/07/2022 3.00 (H) 0.76 - 1.27 mg/dL Final   03/07/2022 2.83 (H) 0.76 - 1.27 mg/dL Final   03/07/2022 2.50 (H) 0.76 - 1.27 mg/dL Final   03/07/2022 2.50 (H) 0.60 - 1.30 mg/dL Final     Comment:     Serial Number: [id number]:  [id number]      Calcium Calcium   Date Value Ref Range Status   03/09/2022 8.6 8.6 - 10.5 mg/dL Final   03/07/2022 8.4 (L) 8.6 - 10.5 mg/dL Final   03/07/2022 7.7 (L) 8.6 - 10.5 mg/dL Final   03/07/2022 8.5 (L) 8.6 - 10.5 mg/dL Final      PO4 No results found for: CAPO4   Albumin Albumin   Date Value Ref Range Status   03/07/2022 3.10 (L) 3.50 - 5.20 g/dL Final      Magnesium No results found for: MG   Uric Acid No results found for: URICACID        Results Review:     I reviewed the patient's new clinical  results.    atorvastatin, 40 mg, Oral, Daily  donepezil, 10 mg, Oral, Nightly  gabapentin, 100 mg, Oral, BID  heparin (porcine), 5,000 Units, Subcutaneous, Q12H  HYDROcodone-acetaminophen, 1 tablet, Oral, BID  insulin NPH-insulin regular, 10 Units, Subcutaneous, Daily  ipratropium, 0.5 mg, Nebulization, 4x Daily - RT  metoprolol tartrate, 37.5 mg, Oral, BID  midodrine, 10 mg, Oral, TID AC  mupirocin, 1 application, Topical, Q12H  pantoprazole, 40 mg, Oral, QAM  primidone, 25 mg, Oral, Daily  sertraline, 50 mg, Oral, Daily  sodium chloride, 10 mL, Intravenous, Q12H           Medication Review: Reviewed    Assessment/Plan     1) ESRD  2) Hypotension  3) CHF  4) Anemia of CKD  5) COPD    Plan: Continue MWF schedule.  Working on transition to [facility] where he can receive dialysis on site - OK to D/C once arrangements finalized.  BP stable on Midodrine.  C/O diet - informed patient that he is not on a renal diet restriction and would need to talk to primary about changing his diet otherwise.    Yony Bhat MD   [facility]  03/10/22  08:06 EST

## 2022-03-10 NOTE — PLAN OF CARE
Goal Outcome Evaluation:     Benson Mclean presents with functional mobility impairments which indicate the need for skilled intervention. Patient required mod/max A of 2 for supine <>sit transfers; while sitting EOB ,needed min/CGA to maintain upright position due to leaning to R and anteriorly at times. Attempted to stand x 2 without success. Patient was ambulatory about 1 month ago per his report. Tolerating session today without incident. Will continue to follow and progress as tolerated.
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
## 2022-03-12 PROBLEM — J44.1 COPD WITH ACUTE EXACERBATION (HCC): Status: ACTIVE | Noted: 2020-12-01

## 2022-03-12 PROBLEM — E87.70 FLUID OVERLOAD: Status: ACTIVE | Noted: 2022-01-01

## 2022-03-13 NOTE — ED PROVIDER NOTES
Subjective   71-year-old morbidly obese and chronically ill male presents to the emergency room from his extended care facility which is [facility] for complaint of trouble breathing.  Patient states he was just discharged from this facility, [facility] 1 to 2 days ago and he was supposed to get breathing treatments but was told by the nurse at [facility] that they are not allowed to give breathing treatments.  Patient receives dialysis on Monday Wednesdays and Fridays and states that he had dialysis yesterday.  Patient denies fever or cough but states he is very short of breath and states that he would feel better if he were able to have his breathing treatments.  Patient denies nausea vomiting or diarrhea.  Onset: Yesterday but worsened today  Location: General shortness of breath  Duration: 1 to 2 days since arriving at [facility] and receiving no breathing treatments   character: Progressively getting worse  Aggravating/Alleviating Factors: Receiving no breathing treatments/none  Radiation: Entire chest  Severity: Severe            Review of Systems   Constitutional: Positive for activity change, appetite change and fatigue. Negative for fever.   HENT: Negative for congestion, rhinorrhea, sinus pressure, sinus pain, trouble swallowing and voice change.    Respiratory: Positive for cough, chest tightness, shortness of breath and wheezing.    Cardiovascular: Positive for chest pain. Negative for palpitations and leg swelling.   Gastrointestinal: Negative for abdominal pain, diarrhea, nausea and vomiting.   Genitourinary: Negative for dysuria.   Musculoskeletal: Negative for arthralgias, back pain and myalgias.   Skin: Negative for rash and wound.   Neurological: Positive for weakness. Negative for dizziness, syncope, speech difficulty, light-headedness and headaches.   Hematological: Negative.    Psychiatric/Behavioral: Negative.        Past Medical History:   Diagnosis Date   • A-fib (HCC)  8/3/2021   • Anemia    • Appetite loss    • Arthritis    • Brain bleed ([facility])    • Carpal tunnel syndrome on left    • CHF (congestive heart failure) ([facility])    • Chronic left-sided low back pain without sciatica 12/27/2017   • CKD (chronic kidney disease) stage 3, GFR 30-59 ml/min ([facility])    • Closed fracture of seventh thoracic vertebra ([facility]) 8/23/2020   • Cognitive communication deficit 12/1/2020   • COPD (chronic obstructive pulmonary disease) ([facility])    • Coronary artery disease    • COVID-19 2/19/2021   • Cytokine release syndrome, grade 1 5/24/2021   • Depression    • Diabetic neuropathy ([facility])    • Dialysis patient ([facility])     mon wed fri   • DM2 (diabetes mellitus, type 2) ([facility])    • GERD (gastroesophageal reflux disease)    • Hyperlipidemia    • Hypertension    • Hypogonadism in male    • Neuropathy    • Nonsustained ventricular tachycardia ([facility]) 7/23/2021   • Obesity    • Paroxysmal atrial fibrillation ([facility]) 12/1/2020   • Sleep apnea    • Stroke ([facility])    • Unsteady gait        Allergies   Allergen Reactions   • Codeine Itching       Past Surgical History:   Procedure Laterality Date   • ANGIOPLASTY      X2   • APPENDECTOMY     • ARTERIOVENOUS FISTULA/SHUNT SURGERY Left 1/20/2022    Procedure: LEFT BRACHIAL CEPHALIC ARTERIAL VENOUS FISTULA CREATION;  Surgeon: Yony Davila MD;  Location: [facility] MAIN OR;  Service: Vascular;  Laterality: Left;   • CARDIAC CATHETERIZATION  11/13/2015   • CARDIAC CATHETERIZATION N/A 5/24/2021    Procedure: Left Heart Cath and coronary angiogram;  Surgeon: Jose G Rm MD;  Location: [facility] CATH INVASIVE LOCATION;  Service: Cardiovascular;  Laterality: N/A;   • CARDIAC CATHETERIZATION  5/24/2021    Procedure: Saphenous Vein Graft;  Surgeon: Jose G Rm MD;  Location: [facility] CATH INVASIVE LOCATION;  Service: Cardiovascular;;   • CARDIAC CATHETERIZATION N/A 5/24/2021    Procedure: Percutaneous Coronary Intervention;  Surgeon: Bernabe Zambrano MD;  Location: [facility] CATH  INVASIVE LOCATION;  Service: Cardiology;  Laterality: N/A;   • CARDIAC CATHETERIZATION N/A 5/24/2021    Procedure: Stent NIELS coronary;  Surgeon: Bernabe Zambrano MD;  Location:  [facility] CATH INVASIVE LOCATION;  Service: Cardiology;  Laterality: N/A;   • CARDIAC CATHETERIZATION N/A 5/26/2021    Procedure: Percutaneous Coronary Intervention;  Surgeon: Bernabe Zambrano MD;  Location:  [facility] CATH INVASIVE LOCATION;  Service: Cardiovascular;  Laterality: N/A;   • CARDIAC CATHETERIZATION N/A 5/26/2021    Procedure: Stent NIELS bypass graft;  Surgeon: Bernabe Zambrano MD;  Location:  [facility] CATH INVASIVE LOCATION;  Service: Cardiovascular;  Laterality: N/A;   • CARDIAC ELECTROPHYSIOLOGY PROCEDURE N/A 5/24/2021    Procedure: Temporary Pacemaker;  Surgeon: Bernabe Zambrano MD;  Location:  [facility] CATH INVASIVE LOCATION;  Service: Cardiology;  Laterality: N/A;   • CARDIAC ELECTROPHYSIOLOGY PROCEDURE N/A 5/26/2021    Procedure: Temporary Pacemaker;  Surgeon: Bernabe Zambrano MD;  Location: [facility] CATH INVASIVE LOCATION;  Service: Cardiovascular;  Laterality: N/A;   • CARPAL TUNNEL RELEASE Left 04/29/2018    carpal tunnel- lt hand// other hand surgeries    • CATARACT EXTRACTION, BILATERAL  2002    Dr. Lux Acosta   • CHOLECYSTECTOMY     • COLON RESECTION  2005   • CORONARY ANGIOPLASTY     • CORONARY ANGIOPLASTY WITH STENT PLACEMENT  11/13/2015    PTCA stent to proximal in stent and mid to distal lad   • CORONARY ANGIOPLASTY WITH STENT PLACEMENT  09/16/2016    PTCA stent to mid lad and stent to vein graft to marginal   • CORONARY ARTERY BYPASS GRAFT  2005    @ [facility]   • CYST REMOVAL      cyst removed from scrotum   • FOOT SURGERY Right 07/17/2018   • FOOT SURGERY Left 02/04/2019   • PORTACATH PLACEMENT     • TOE AMPUTATION Right     right toe removed D/T infected cut that went to the bone       Family History   Problem Relation Age of Onset   • Heart disease Mother    • Heart disease Father    • Diabetes Sister    • Heart disease Sister    •  Diabetes Brother    • Mental illness Brother        Social History     Socioeconomic History   • Marital status:    Tobacco Use   • Smoking status: Former Smoker     Packs/day: 1.00     Years: 60.00     Pack years: 60.00     Types: Cigarettes   • Smokeless tobacco: Never Used   • Tobacco comment: Patient quit smoking 11/2020   Vaping Use   • Vaping Use: Never used   Substance and Sexual Activity   • Alcohol use: No   • Drug use: Yes     Frequency: 1.0 times per week     Types: Marijuana     Comment: socially   • Sexual activity: Defer           Objective   Physical Exam  Constitutional:       Appearance: He is obese. He is ill-appearing. He is not toxic-appearing or diaphoretic.   HENT:      Head: Normocephalic and atraumatic.   Eyes:      Extraocular Movements: Extraocular movements intact.      Pupils: Pupils are equal, round, and reactive to light.   Cardiovascular:      Rate and Rhythm: Tachycardia present.   Pulmonary:      Effort: Tachypnea and accessory muscle usage present.      Breath sounds: Examination of the right-lower field reveals decreased breath sounds and wheezing. Examination of the left-lower field reveals decreased breath sounds and wheezing. Decreased breath sounds and wheezing present.   Chest:      Chest wall: No tenderness, crepitus or edema.   Musculoskeletal:      Right lower leg: Edema present.      Left lower leg: Edema present.   Skin:     General: Skin is dry.      Capillary Refill: Capillary refill takes 2 to 3 seconds.      Coloration: Skin is pale.   Neurological:      General: No focal deficit present.      Mental Status: He is alert and oriented to person, place, and time.   Psychiatric:         Mood and Affect: Mood normal. Mood is not anxious.         Behavior: Behavior normal. Behavior is not agitated.         Procedures           ED Course  ED Course as of 03/12/22 2303   Sat Mar 12, 2022   2250 RE-EXAMINED PATIENT, RESTING COMFORTABLE; hr 106 AND SAO2 IS 92 ON 3L NC  [CT]      ED Course User Index  [CT] Leona Knowles APRN      Peripheral IV obtained and blood work of CBC CMP BNP and troponin obtained.  1 view chest x-ray obtained and is significant for no change from previous admission from 5 days ago.  Creatinine is slightly elevated prior to 3 days ago.  DuoNeb ordered for chief complaint of shortness of breath and wheezes auscultated on physical exam.  125 mg of Solu-Medrol administered via peripheral IV.  Patient also has increased oxygen needs was normally on 2 to 3 L nasal cannula but is on 4 L currently to maintain sats above 92.  After breathing treatment patient's SaO2 is 96% on 4 L.    Labs Reviewed   COMPREHENSIVE METABOLIC PANEL - Abnormal; Notable for the following components:       Result Value    Glucose 126 (*)     BUN 24 (*)     Creatinine 3.62 (*)     Sodium 132 (*)     Chloride 97 (*)     Albumin 3.40 (*)     BUN/Creatinine Ratio 6.6 (*)     eGFR 17.2 (*)     All other components within normal limits    Narrative:     GFR Normal >60  Chronic Kidney Disease <60  Kidney Failure <15     BNP (IN-HOUSE) - Abnormal; Notable for the following components:    proBNP >70,000.0 (*)     All other components within normal limits    Narrative:     Among patients with dyspnea, NT-proBNP is highly sensitive for the detection of acute congestive heart failure. In addition NT-proBNP of <300 pg/ml effectively rules out acute congestive heart failure with 99% negative predictive value.    Results may be falsely decreased if patient taking Biotin.     CBC WITH AUTO DIFFERENTIAL - Abnormal; Notable for the following components:    RBC 3.45 (*)     Hemoglobin 10.5 (*)     Hematocrit 32.9 (*)     RDW 17.7 (*)     RDW-SD 60.4 (*)     Lymphocyte % 18.5 (*)     Monocyte % 14.1 (*)     Monocytes, Absolute 1.00 (*)     All other components within normal limits   SCAN SLIDE - Normal    Narrative:     No platelet clumps seen.   CBC AND DIFFERENTIAL    Narrative:     The following orders  were created for panel order CBC & Differential.  Procedure                               Abnormality         Status                     ---------                               -----------         ------                     CBC Auto Differential[[id number]]        Abnormal            Final result               Scan Slide[[id number]]                   Normal              Final result                 Please view results for these tests on the individual orders.   EXTRA TUBES    Narrative:     The following orders were created for panel order Extra Tubes.  Procedure                               Abnormality         Status                     ---------                               -----------         ------                     Lavender Top[[id number]]                                     Final result                 Please view results for these tests on the individual orders.   LAVENDER TOP       Vitals:    03/12/22 2231   BP: 122/69   Pulse: 99   Resp:    Temp:    SpO2: 96%     Medications   sodium chloride 0.9 % flush 10 mL (has no administration in time range)   furosemide (LASIX) injection 80 mg (has no administration in time range)   ipratropium-albuterol (DUO-NEB) nebulizer solution 3 mL (3 mL Nebulization Given 3/12/22 2015)   methylPREDNISolone sodium succinate (SOLU-Medrol) injection 125 mg (125 mg Intravenous Given 3/12/22 2027)       XR Chest 1 View    Result Date: 3/12/2022  Impression: Overall similar appearance of the chest compared to the prior exam with bilateral pleural fluid collections, low lung volumes, and large cardiac silhouette. Hazy opacities in both lower lung zones could be due to atelectasis or pneumonia. Follow-up images recommended to ensure clearance. Electronically signed by:  Jacques Otto M.D.  3/12/2022 8:17 PM                                             MDM  BNP remains greater than 70,000; 80 mg of Lasix ordered and given via peripheral IV.  125 Solu-Medrol and DuoNeb ordered for  chief complaint of shortness of breath and bilateral wheezes auscultated on physical exam.  Final diagnoses:   Congestive heart failure, unspecified HF chronicity, unspecified heart failure type (HCC)   Acute on chronic renal insufficiency   Bilateral pleural effusion       ED Disposition  ED Disposition     ED Disposition   Decision to Admit    Condition   --    Comment   Level of Care: Telemetry [5]   Admitting Physician: VAN CARVALHO [[id number]]   Attending Physician: VAN CARVALHO [[id number]]               No follow-up provider specified.       Medication List      No changes were made to your prescriptions during this visit.          Leona Knowles, APRN  03/12/22 0763

## 2022-03-13 NOTE — CONSULTS
Cardiology Consult Note      REQUESTING PHYSICIAN    Yordan Hackett MD    PATIENT IDENTIFICATION  Name: Benson Mclean  Age: 71 y.o.  Sex: male  :  1950  MRN: [id number]             REASON FOR CONSULTATION:   -status post CABG . status post stent to LAD     Status post stent to LAD 2015  Status post stent to mid LAD and SVG to marginal branch 2016.  Status post stent to mid LAD 2021  Status post stent to SVG to RCA 2021     Cardiac catheterization 2021 revealed  Left ventricle angiogram was not performed due to pre-existing renal dysfunction.  Left main coronary artery normal.  Left anterior descending artery has mid segment lengthy 90% disease within the previously placed stent.  Distal left into descending artery has diffuse disease.  Circumflex coronary artery is totally occluded.  (Chronic)  Right coronary artery is chronically occluded.  SVG to marginal branch (jump graft to OM1 and OM 2) is totally occluded (new finding compared to .  SVG to PDA has distal radiolucency.  However no definite obstructive disease is present.       -status post myocardial infarction  and  .      -history of intermittent but mostly persistent atrial fibrillation     -Acute on chronic congestive heart failure.  Compensated at this time.     Recent echocardiogram showed left ventricle dysfunction with ejection fraction of 35%.     -History of right-sided weakness with left MCA territory stroke.-Improved     Transesophageal echocardiogram 2020.  Biatrial enlargement.  Smoking effect in the left atrium and left ventricle and left atrial appendage.  Mild mitral and aortic regurgitation.  Left ventricle enlargement with diffuse hypocontractility with ejection fraction of 35 to 40%.     Echocardiogram 2020 revealed left atrial enlargement left ventricle dysfunction with ejection fraction of 40%.  Negative bubble study.      -diabetes hypertension and sleep apnea.  ESRD.      -status post colon surgery (partial colectomy) appendectomy cholecystectomy right 4th toe removal and carpal tunnel surgery      -continued smoking 1 pack per day -abstinence from smoking      -allergy to codeine.      CC:  Shortness of breath  Acute on chronic heart failure with reduced ejection fraction    HISTORY OF PRESENT ILLNESS:   Patient [facility] of [facility].  He was discharged from this facility on 3/10/2022 for hypotension following dialysis and acute on chronic systolic heart failure.  Patient stated that he is supposed to be receiving breathing treatments but was told by the nurse at [facility] that they are not allowed to give breathing treatments.  He denies any chest pain, pressure, tightness, palpitations.  No nausea, vomiting or diarrhea.  Upon my evaluation, his breath sounds are quite coarse and audible.  He has occasional congested cough.  EKG shows atrial fibrillation with left bundle branch block at 106.  Blood pressure is quite stable 133/67.  He has no lower extremity edema.  He is currently on nasal cannula at 5L.  Patient reports that although he does make some urine it is very little.    REVIEW OF SYSTEMS:  Pertinent items are noted in HPI, all other systems reviewed and negative    OBJECTIVE   Creatinine 3.64  Sodium 132  TSH 8.74    ASSESSMENT  Acute on chronic fluid overload  COPD exacerbation  Chronic systolic heart failure  End-stage renal disease on hemodialysis  Left pleural effusion per echo  Severe dilated/ischemic cardiomyopathy  Coronary artery disease  History of CABG/PCI  Chronic atrial fibrillation-not on OAC  History of prior stroke  Obstructive sleep apnea  Anemia of chronic disease    PLAN  Fluid management per HD  Patient A. fib-controlled.  Chronic left bundle branch block  Continue statin, BB, midodrine  Patient on fluid restriction  TTE ordered with EF 26-30%, RVSP 45  "to 55 mmHg, RV mild-moderately dilated, moderate sized left pleural effusion  Patient's primary cardiologist-Dr. Rm-to resume care in a.m.        Vital Signs  Visit Vitals  /67 (BP Location: Right arm, Patient Position: Lying)   Pulse 105   Temp 97.8 °F (36.6 °C) (Oral)   Resp 20   Ht 177.8 cm (70\")   Wt 115 kg (254 lb)   SpO2 96%   BMI 36.45 kg/m²     Oxygen Therapy  SpO2: 96 %  Pulse Oximetry Type: Continuous  Device (Oxygen Therapy): nasal cannula  Flow (L/min): 3  Flowsheet Rows      Flowsheet Row First Filed Value   Admission Height 177.8 cm (70\") Documented at 03/12/2022 1910   Admission Weight 113 kg (250 lb) Documented at 03/12/2022 1910          Intake & Output (last 3 days)         03/10 0701  03/11 0700 03/11 0701  03/12 0700 03/12 0701  03/13 0700 03/13 0701  03/14 0700            Urine Unmeasured Occurrence   1 x     Stool Unmeasured Occurrence   1 x           Lines, Drains & Airways       Active LDAs       Name Placement date Placement time Site Days    Peripheral IV 03/12/22 2025 Right Antecubital 03/12/22 2025  Antecubital  less than 1    Hemodialysis Cath Double 08/23/21  1450  Chest  201                    MEDICAL HISTORY    Past Medical History:   Diagnosis Date    A-fib ([facility]) 8/3/2021    Anemia     Appetite loss     Arthritis     Brain bleed ([facility])     Carpal tunnel syndrome on left     CHF (congestive heart failure) ([facility])     Chronic left-sided low back pain without sciatica 12/27/2017    CKD (chronic kidney disease) stage 3, GFR 30-59 ml/min ([facility])     Closed fracture of seventh thoracic vertebra ([facility]) 8/23/2020    Cognitive communication deficit 12/1/2020    COPD (chronic obstructive pulmonary disease) ([facility])     Coronary artery disease     COVID-19 2/19/2021    Cytokine release syndrome, grade 1 5/24/2021    Depression     Diabetic neuropathy ([facility])     Dialysis patient ([facility])     mon wed fri    DM2 (diabetes mellitus, type 2) ([facility])     GERD (gastroesophageal reflux disease)     " Hyperlipidemia     Hypertension     Hypogonadism in male     Neuropathy     Nonsustained ventricular tachycardia ([facility]) 7/23/2021    Obesity     Paroxysmal atrial fibrillation ([facility]) 12/1/2020    Sleep apnea     Stroke ([facility])     Unsteady gait         SURGICAL HISTORY    Past Surgical History:   Procedure Laterality Date    ANGIOPLASTY      X2    APPENDECTOMY      ARTERIOVENOUS FISTULA/SHUNT SURGERY Left 1/20/2022    Procedure: LEFT BRACHIAL CEPHALIC ARTERIAL VENOUS FISTULA CREATION;  Surgeon: Yony Davila MD;  Location: [facility] MAIN OR;  Service: Vascular;  Laterality: Left;    CARDIAC CATHETERIZATION  11/13/2015    CARDIAC CATHETERIZATION N/A 5/24/2021    Procedure: Left Heart Cath and coronary angiogram;  Surgeon: Jose G Rm MD;  Location: [facility] CATH INVASIVE LOCATION;  Service: Cardiovascular;  Laterality: N/A;    CARDIAC CATHETERIZATION  5/24/2021    Procedure: Saphenous Vein Graft;  Surgeon: Jose G Rm MD;  Location: [facility] CATH INVASIVE LOCATION;  Service: Cardiovascular;;    CARDIAC CATHETERIZATION N/A 5/24/2021    Procedure: Percutaneous Coronary Intervention;  Surgeon: Bernabe Zambrano MD;  Location: [facility] CATH INVASIVE LOCATION;  Service: Cardiology;  Laterality: N/A;    CARDIAC CATHETERIZATION N/A 5/24/2021    Procedure: Stent NIELS coronary;  Surgeon: Bernabe Zambrano MD;  Location:  [facility] CATH INVASIVE LOCATION;  Service: Cardiology;  Laterality: N/A;    CARDIAC CATHETERIZATION N/A 5/26/2021    Procedure: Percutaneous Coronary Intervention;  Surgeon: Bernabe Zambrano MD;  Location: [facility] CATH INVASIVE LOCATION;  Service: Cardiovascular;  Laterality: N/A;    CARDIAC CATHETERIZATION N/A 5/26/2021    Procedure: Stent NIELS bypass graft;  Surgeon: Bernabe Zambrano MD;  Location: [facility] CATH INVASIVE LOCATION;  Service: Cardiovascular;  Laterality: N/A;    CARDIAC ELECTROPHYSIOLOGY PROCEDURE N/A 5/24/2021    Procedure: Temporary Pacemaker;  Surgeon: Bernabe Zambrano MD;  Location: [facility] CATH  "INVASIVE LOCATION;  Service: Cardiology;  Laterality: N/A;    CARDIAC ELECTROPHYSIOLOGY PROCEDURE N/A 5/26/2021    Procedure: Temporary Pacemaker;  Surgeon: Bernabe Zambrano MD;  Location: [facility] INVASIVE LOC[facility];  Service: Cardiovascular;  Laterality: N/A;    CARPAL TUNNEL RELEASE Left 04/29/2018    carpal tunnel- lt hand// other hand surgeries     CATARACT EXTRACTION, BILATERAL  2002    Dr. Lux Acosta    CHOLECYSTECTOMY      COLON RESECTION  2005    CORONARY ANGIOPLASTY      CORONARY ANGIOPLASTY WITH STENT PLACEMENT  11/13/2015    PTCA stent to proximal in stent and mid to distal lad    CORONARY ANGIOPLASTY WITH STENT PLACEMENT  09/16/2016    PTCA stent to mid lad and stent to vein graft to marginal    CORONARY ARTERY BYPASS GRAFT  2005    @ [facility]    CYST REMOVAL      cyst removed from scrotum    FOOT SURGERY Right 07/17/2018    FOOT SURGERY Left 02/04/2019    PORTACATH PLACEMENT      TOE AMPUTATION Right     right toe removed D/T infected cut that went to the bone        FAMILY HISTORY    Family History   Problem Relation Age of Onset    Heart disease Mother     Heart disease Father     Diabetes Sister     Heart disease Sister     Diabetes Brother     Mental illness Brother        SOCIAL HISTORY    Social History     Tobacco Use    Smoking status: Former Smoker     Packs/day: 1.00     Years: 60.00     Pack years: 60.00     Types: Cigarettes    Smokeless tobacco: Never Used    Tobacco comment: Patient quit smoking 11/2020   Substance Use Topics    Alcohol use: No        ALLERGIES    Allergies   Allergen Reactions    Codeine Itching              /67 (BP Location: Right arm, Patient Position: Lying)   Pulse 105   Temp 97.8 °F (36.6 °C) (Oral)   Resp 20   Ht 177.8 cm (70\")   Wt 115 kg (254 lb)   SpO2 96%   BMI 36.45 kg/m²   Intake/Output last 3 shifts:  No intake/output data recorded.  Intake/Output this shift:  No intake/output data recorded.    PHYSICAL EXAM:    General: Well-developed, chronically " ill-appearing 71-year-old male who is alert, cooperative, no distress, appears stated age  Head:  Normocephalic, atraumatic, mucous membranes moist  Eyes:  Conjunctiva/corneas clear, EOM's intact     Neck:  Supple,  no bruit  Lungs: Lung sounds very coarse, audible, congested cough, no wheezes noted.  Chest wall: Port noted left upper chest  Heart::  Regular rate and rhythm, S1 and S2 normal, 1/6 holosystolic murmur.  No rub or gallop  Abdomen: Obese soft, non-tender, nondistended bowel sounds active  Extremities: No cyanosis, clubbing, or edema.  Fistula left upper extremity  Pulses: 2+ and symmetric all extremities  Skin:  No rashes or lesions  Neuro/psych: A&O x3. CN II through XII are grossly intact with appropriate affect      Scheduled Meds:      atorvastatin, 40 mg, Oral, Daily  cholecalciferol, 1,000 Units, Oral, Daily  docusate sodium, 100 mg, Oral, Daily  donepezil, 10 mg, Oral, Nightly  gabapentin, 100 mg, Oral, BID  HYDROcodone-acetaminophen, 1 tablet, Oral, BID  insulin lispro, 0-9 Units, Subcutaneous, TID AC  insulin NPH-insulin regular, 10 Units, Subcutaneous, Daily With Breakfast  ipratropium-albuterol, 3 mL, Nebulization, 4x Daily - RT  metoprolol tartrate, 37.5 mg, Oral, BID  midodrine, 10 mg, Oral, TID AC  neomycin-bacitracin-polymyxin, 1 application, Topical, Nightly  pantoprazole, 40 mg, Oral, QAM AC  primidone, 25 mg, Oral, Daily  sertraline, 50 mg, Oral, Daily  sodium chloride, 10 mL, Intravenous, Q12H  vitamin B-12, 1,000 mcg, Oral, Daily        Continuous Infusions:         PRN Meds:      albuterol    bisacodyl    dextrose    dextrose    glucagon (human recombinant)    insulin lispro **AND** insulin lispro    melatonin    nitroglycerin    ondansetron    ondansetron    [COMPLETED] Insert peripheral IV **AND** sodium chloride    sodium chloride        Results Review:     I reviewed the patient's new clinical results.    CBC    Results from last 7 days   Lab Units 03/12/22 2023 03/09/22  3263  03/07/22  2326 03/07/22  1801 03/07/22  1319   WBC 10*3/mm3 7.20 4.60 5.40 4.30 4.90   HEMOGLOBIN g/dL 10.5* 10.8* 10.4* 11.5* 11.1*   PLATELETS 10*3/mm3 177 227 236 253 262     Cr Clearance Estimated Creatinine Clearance: 23.6 mL/min (A) (by C-G formula based on SCr of 3.64 mg/dL (H)).  Coag     HbA1C   Lab Results   Component Value Date    HGBA1C 7.4 (H) 07/21/2021    HGBA1C 7.2 (H) 05/19/2021    HGBA1C 7.5 (H) 05/06/2021     Blood Glucose   Glucose   Date/Time Value Ref Range Status   03/13/2022 0730 153 (H) 70 - 105 mg/dL Final     Comment:     Serial Number: [id number]:  [id number]   03/13/2022 0542 242 (H) 70 - 105 mg/dL Final     Comment:     Serial Number: [id number]:  [id number]   03/10/2022 1145 163 (H) 70 - 105 mg/dL Final     Comment:     Serial Number: [id number]:  [id number]     Infection   Results from last 7 days   Lab Units 03/07/22  1426 03/07/22  1327   BLOODCX  No growth at 5 days No growth at 5 days     CMP   Results from last 7 days   Lab Units 03/13/22  0408 03/12/22 2102 03/09/22 0228 03/07/22  2326 03/07/22  1801 03/07/22  1426 03/07/22  1425   SODIUM mmol/L 132* 132* 134* 136 136 134*  --    POTASSIUM mmol/L 5.2 4.7 4.4 4.1 4.1 3.8  --    CHLORIDE mmol/L 95* 97* 95* 96* 95* 95*  --    CO2 mmol/L 22.0 24.0 28.0 32.0* 33.0* 31.0*  --    BUN mg/dL 30* 24* 27* 20 18 17  --    CREATININE mg/dL 3.64* 3.62* 3.51* 3.00* 2.83* 2.50* 2.50*   GLUCOSE mg/dL 171* 126* 111* 175* 149* 156*  --    ALBUMIN g/dL  --  3.40*  --   --   --  3.10*  --    BILIRUBIN mg/dL  --  0.6  --   --   --  0.4  --    ALK PHOS U/L  --  55  --   --   --  64  --    AST (SGOT) U/L  --  12  --   --   --  16  --    ALT (SGPT) U/L  --  8  --   --   --  5  --      ABG      UA      SAVANA  No results found for: POCMETH, POCAMPHET, POCBARBITUR, POCBENZO, POCCOCAINE, POCOPIATES, POCOXYCODO, POCPHENCYC, POCPROPOXY, POCTHC, POCTRICYC  Lysis Labs   Results from last 7 days   Lab Units 03/13/22  0408 03/12/22  9102  03/12/22 2023 03/09/22  0228 03/07/22  2326 03/07/22  1801 03/07/22  1426 03/07/22  1425 03/07/22  1319   HEMOGLOBIN g/dL  --   --  10.5* 10.8* 10.4* 11.5*  --   --  11.1*   PLATELETS 10*3/mm3  --   --  177 227 236 253  --   --  262   CREATININE mg/dL 3.64* 3.62*  --  3.51* 3.00* 2.83* 2.50* 2.50*  --      Radiology(recent) XR Chest 1 View    Result Date: 3/12/2022  Impression: Overall similar appearance of the chest compared to the prior exam with bilateral pleural fluid collections, low lung volumes, and large cardiac silhouette. Hazy opacities in both lower lung zones could be due to atelectasis or pneumonia. Follow-up images recommended to ensure clearance. Electronically signed by:  Jacques Otto M.D.  3/12/2022 8:17 PM        Results from last 7 days   Lab Units 03/12/22 2102   TROPONIN T ng/mL 0.146*       Xrays, labs reviewed personally by physician.    ECG/EMG Results (most recent)       Procedure Component Value Units Date/Time    ECG 12 Lead [[id number]] Collected: 03/12/22 1926     Updated: 03/12/22 1932     QT Interval 434 ms     Narrative:      HEART RATE= 96  bpm  RR Interval= 622  ms  RI Interval=   ms  P Horizontal Axis=   deg  P Front Axis=   deg  QRSD Interval= 141  ms  QT Interval= 434  ms  QRS Axis= -19  deg  T Wave Axis= 158  deg  - ABNORMAL ECG -  Atrial fibrillation  Left bundle branch block  Electronically Signed By:   Date and Time of Study: 2022-03-12 19:26:51    Adult Transthoracic Echo Complete w/ Color, Spectral and Contrast if necessary per protocol [[id number]] Resulted: 03/13/22 0845     Updated: 03/13/22 0847     Target HR (85%) 127 bpm      Max. Pred. HR (100%) 149 bpm      Ao pk jalen 108.5 cm/sec      Ao V2 VTI 16.7 cm      EDV(cubed) 119.6 ml      ESV(cubed) 93.6 ml      IVS/LVPW 1.42 cm      LV mass(C)d 292.1 grams      LVPWd 1.18 cm      MR max PG 71.6 mmHg      MV dec slope 647.7 cm/sec2      MV dec time 0.18 msec      MV V2 VTI 23.1 cm      PA V2 max 87.4 cm/sec      RV V1  max PG 0.71 mmHg      RV V1 max 42.0 cm/sec      RV V1 VTI 4.1 cm      RVIDd 3.2 cm      TR max PG 47.6 mmHg      Ao max PG 4.7 mmHg      Ao mean PG 2.6 mmHg      FS 7.9 %      IVSd 1.67 cm      LA dimension(2D) 5.0 cm      LVIDd 4.9 cm      LVIDs 4.5 cm      LVOT area 3.5 cm2      LVOT diam 2.12 cm      MV E/A 3.0     MV max PG 6.7 mmHg      MV mean PG 3.3 mmHg      MR max jalen 419.1 cm/sec      MV A max jalen 38.3 cm/sec      MV E max jalen 113.4 cm/sec      TR max jalen 344.3 cm/sec               Medication Review:   I have reviewed the patient's current medication list  Scheduled Meds:atorvastatin, 40 mg, Oral, Daily  cholecalciferol, 1,000 Units, Oral, Daily  docusate sodium, 100 mg, Oral, Daily  donepezil, 10 mg, Oral, Nightly  gabapentin, 100 mg, Oral, BID  HYDROcodone-acetaminophen, 1 tablet, Oral, BID  insulin lispro, 0-9 Units, Subcutaneous, TID AC  insulin NPH-insulin regular, 10 Units, Subcutaneous, Daily With Breakfast  ipratropium-albuterol, 3 mL, Nebulization, 4x Daily - RT  metoprolol tartrate, 37.5 mg, Oral, BID  midodrine, 10 mg, Oral, TID AC  neomycin-bacitracin-polymyxin, 1 application, Topical, Nightly  pantoprazole, 40 mg, Oral, QAM AC  primidone, 25 mg, Oral, Daily  sertraline, 50 mg, Oral, Daily  sodium chloride, 10 mL, Intravenous, Q12H  vitamin B-12, 1,000 mcg, Oral, Daily      Continuous Infusions:   PRN Meds:.  albuterol    bisacodyl    dextrose    dextrose    glucagon (human recombinant)    insulin lispro **AND** insulin lispro    melatonin    nitroglycerin    ondansetron    ondansetron    [COMPLETED] Insert peripheral IV **AND** sodium chloride    sodium chloride    Imaging:  Imaging Results (Last 72 Hours)       Procedure Component Value Units Date/Time    XR Chest 1 View [[id number]] Collected: 03/12/22 2215     Updated: 03/12/22 2218    Narrative:      Frontal chest radiograph    Indication:  Cough    Comparison:  March 7, 2022    Findings:    Right approach vascular catheter overlies the  "superior cavoatrial junction. Median sternotomy wires redemonstrated. Lung volumes are low, particularly on the right. Bilateral pleural fluid collections are similar to the prior exam. There are hazy   opacities in both lower lung zones. Large cardiac silhouette again demonstrated. No visible pneumothorax. Unchanged appearance of the bones.      Impression:      Impression:    Overall similar appearance of the chest compared to the prior exam with bilateral pleural fluid collections, low lung volumes, and large cardiac silhouette. Hazy opacities in both lower lung zones could be due to atelectasis or pneumonia. Follow-up   images recommended to ensure clearance.    Electronically signed by:  Jacques Otto M.D.    3/12/2022 8:17 PM              FABIOLA Rodriguez  03/13/22  08:52 EDT       EMR Dragon/Transcription:   \"Dictated utilizing Dragon dictation\".                 Electronically signed by FABIOLA Rodriguez, 03/13/22, 8:52 AM EDT.  Cardiology attending:  Seen and examined.  Chart and labs reviewed.  History and exam findings are verified with above changes noted.  Assessment and plan notated by APC after being formulated by attending consultant.  Note that greater than 50% of the time spent in care of the patient was provided by attending consultant.  Patient reports that he is compliant with hemodialysis.  Reports that he does make urine but it is very little.  Does have a pleural effusion on echocardiogram.  Denies chest pain.  Reports will have brief hemodialysis tonight and start with full run tomorrow.  Dr. Rm to assume care tomorrow.  Monitor electrolytes and rhythm.    "

## 2022-03-13 NOTE — CONSULTS
RENAL/KCC:    Referring Provider: Dr. Hackett  Reason for Consultation: ESRD    Subjective     Chief complaint: SOA    History of present illness:  Patient is a 72 yo WM with h/o ESRD on MWF HD, CAD, CHF and COPD as well as chronic hypotension who presents with SOA, cough and wheezing.  He was recently admitted for hypotension and was discharged to [facility] where he received HD on location.  Apparently his breathing treatments weren't resumed at D/C.  He has been admitted for treatment of a COPD exacerbation.    History  Past Medical History:   Diagnosis Date   • A-fib (Formerly [facility]) 8/3/2021   • Anemia    • Appetite loss    • Arthritis    • Brain bleed (Formerly [facility])    • Carpal tunnel syndrome on left    • CHF (congestive heart failure) (Formerly [facility])    • Chronic left-sided low back pain without sciatica 12/27/2017   • CKD (chronic kidney disease) stage 3, GFR 30-59 ml/min (Formerly [facility])    • Closed fracture of seventh thoracic vertebra (Formerly [facility]) 8/23/2020   • Cognitive communication deficit 12/1/2020   • COPD (chronic obstructive pulmonary disease) (Formerly [facility])    • Coronary artery disease    • COVID-19 2/19/2021   • Cytokine release syndrome, grade 1 5/24/2021   • Depression    • Diabetic neuropathy ([facility]ly [facility])    • Dialysis patient (Formerly [facility])     mon wed fri   • DM2 (diabetes mellitus, type 2) (Formerly [facility])    • GERD (gastroesophageal reflux disease)    • Hyperlipidemia    • Hypertension    • Hypogonadism in male    • Neuropathy    • Nonsustained ventricular tachycardia (Formerly [facility]) 7/23/2021   • Obesity    • Paroxysmal atrial fibrillation ([facility]ly [facility]) 12/1/2020   • Sleep apnea    • Stroke ([facility]ly [facility])    • Unsteady gait    ,   Past Surgical History:   Procedure Laterality Date   • ANGIOPLASTY      X2   • APPENDECTOMY     • ARTERIOVENOUS FISTULA/SHUNT SURGERY Left 1/20/2022    Procedure: LEFT BRACHIAL CEPHALIC ARTERIAL VENOUS FISTULA CREATION;  Surgeon: Yony Davila MD;  Location: [facility] MAIN OR;  Service: Vascular;  Laterality: Left;   • CARDIAC CATHETERIZATION  11/13/2015   •  CARDIAC CATHETERIZATION N/A 5/24/2021    Procedure: Left Heart Cath and coronary angiogram;  Surgeon: Jose G Rm MD;  Location:  [facility] CATH INVASIVE LOCATION;  Service: Cardiovascular;  Laterality: N/A;   • CARDIAC CATHETERIZATION  5/24/2021    Procedure: Saphenous Vein Graft;  Surgeon: Jose G Rm MD;  Location:  [facility] CATH INVASIVE LOCATION;  Service: Cardiovascular;;   • CARDIAC CATHETERIZATION N/A 5/24/2021    Procedure: Percutaneous Coronary Intervention;  Surgeon: Bernabe Zambrano MD;  Location:  [facility] CATH INVASIVE LOCATION;  Service: Cardiology;  Laterality: N/A;   • CARDIAC CATHETERIZATION N/A 5/24/2021    Procedure: Stent NIELS coronary;  Surgeon: Bernabe Zambrano MD;  Location:  [facility] CATH INVASIVE LOCATION;  Service: Cardiology;  Laterality: N/A;   • CARDIAC CATHETERIZATION N/A 5/26/2021    Procedure: Percutaneous Coronary Intervention;  Surgeon: Bernabe Zambrano MD;  Location:  [facility] CATH INVASIVE LOCATION;  Service: Cardiovascular;  Laterality: N/A;   • CARDIAC CATHETERIZATION N/A 5/26/2021    Procedure: Stent NIELS bypass graft;  Surgeon: Bernabe Zambrano MD;  Location:  [facility] CATH INVASIVE LOCATION;  Service: Cardiovascular;  Laterality: N/A;   • CARDIAC ELECTROPHYSIOLOGY PROCEDURE N/A 5/24/2021    Procedure: Temporary Pacemaker;  Surgeon: Bernabe Zambrano MD;  Location:  [facility] CATH INVASIVE LOCATION;  Service: Cardiology;  Laterality: N/A;   • CARDIAC ELECTROPHYSIOLOGY PROCEDURE N/A 5/26/2021    Procedure: Temporary Pacemaker;  Surgeon: Bernabe Zambrano MD;  Location: [facility] CATH INVASIVE LOCATION;  Service: Cardiovascular;  Laterality: N/A;   • CARPAL TUNNEL RELEASE Left 04/29/2018    carpal tunnel- lt hand// other hand surgeries    • CATARACT EXTRACTION, BILATERAL  2002    Dr. Lux Acosta   • CHOLECYSTECTOMY     • COLON RESECTION  2005   • CORONARY ANGIOPLASTY     • CORONARY ANGIOPLASTY WITH STENT PLACEMENT  11/13/2015    PTCA stent to proximal in stent and mid to distal lad   • CORONARY ANGIOPLASTY  WITH STENT PLACEMENT  09/16/2016    PTCA stent to mid lad and stent to vein graft to marginal   • CORONARY ARTERY BYPASS GRAFT  2005    @ [facility]   • CYST REMOVAL      cyst removed from scrotum   • FOOT SURGERY Right 07/17/2018   • FOOT SURGERY Left 02/04/2019   • PORTACATH PLACEMENT     • TOE AMPUTATION Right     right toe removed D/T infected cut that went to the bone   ,   Family History   Problem Relation Age of Onset   • Heart disease Mother    • Heart disease Father    • Diabetes Sister    • Heart disease Sister    • Diabetes Brother    • Mental illness Brother    ,   Social History     Socioeconomic History   • Marital status:    Tobacco Use   • Smoking status: Former Smoker     Packs/day: 1.00     Years: 60.00     Pack years: 60.00     Types: Cigarettes   • Smokeless tobacco: Never Used   • Tobacco comment: Patient quit smoking 11/2020   Vaping Use   • Vaping Use: Never used   Substance and Sexual Activity   • Alcohol use: No   • Drug use: Yes     Frequency: 1.0 times per week     Types: Marijuana     Comment: socially   • Sexual activity: Defer     E-cigarette/Vaping   • E-cigarette/Vaping Use Never User    • Passive Exposure No    • Counseling Given No      E-cigarette/Vaping Substances   • Nicotine No    • THC No    • CBD No    • Flavoring No      E-cigarette/Vaping Devices   • Disposable No    • Pre-filled or Refillable Cartridge No    • Refillable Tank No    • Pre-filled Pod No        ,   Medications Prior to Admission   Medication Sig Dispense Refill Last Dose   • atorvastatin (LIPITOR) 40 MG tablet Take 40 mg by mouth Daily.   Unknown at Unknown time   • bisacodyl (DULCOLAX) 10 MG suppository Insert 10 mg into the rectum Daily As Needed for Constipation.   Unknown at Unknown time   • cholecalciferol (VITAMIN D3) 25 MCG (1000 UT) tablet Take 1,000 Units by mouth Daily.   Unknown at Unknown time   • docusate sodium (COLACE) 100 MG capsule Take 100 mg by mouth Daily.   Unknown at Unknown time   •  donepezil (ARICEPT) 10 MG tablet Take 10 mg by mouth Every Night.   Unknown at Unknown time   • fluticasone (FLONASE) 50 MCG/ACT nasal spray 2 sprays by Each Nare route 2 (Two) Times a Day.   Unknown at Unknown time   • gabapentin (NEURONTIN) 100 MG capsule Take 100 mg by mouth 2 (Two) Times a Day.   Unknown at Unknown time   • Glucagon, rDNA, (Glucagon Emergency) 1 MG kit Inject 1 mg into the appropriate muscle as directed by prescriber Daily As Needed (Low blood sugar).   Unknown at Unknown time   • HYDROcodone-acetaminophen (NORCO) 7.5-325 MG per tablet Take 1 tablet by mouth 2 (Two) Times a Day.   Unknown at Unknown time   • Insulin NPH Isophane & Regular (HumuLIN 70/30 KwikPen) (70-30) 100 UNIT/ML suspension pen-injector Inject 10 Units under the skin into the appropriate area as directed Daily.   Unknown at Unknown time   • magnesium hydroxide (MILK OF MAGNESIA) 400 MG/5ML suspension Take 15 mL by mouth Daily As Needed for Constipation.   Unknown at Unknown time   • melatonin 3 MG tablet Take 3 mg by mouth Daily.   Unknown at Unknown time   • Metoprolol Tartrate 37.5 MG tablet Take 37.5 mg by mouth 2 (Two) Times a Day. Hold for SBP <110 or HR < 60   Unknown at Unknown time   • midodrine (PROAMATINE) 10 MG tablet Take 10 mg by mouth 3 (Three) Times a Day Before Meals. Hold for /90   Unknown at Unknown time   • neomycin-bacitracin-polymyxin (NEOSPORIN) 5-400-5000 ointment Apply 1 application topically to the appropriate area as directed Every Night. Apply to abrasions on right inner thigh and intact blister on left inner thigh   Unknown at Unknown time   • Nitroglycerin 400 MCG pack Place 1 packet under the tongue Daily As Needed (Chest pain).   Unknown at Unknown time   • omeprazole (priLOSEC) 20 MG capsule Take 20 mg by mouth Daily.   Unknown at Unknown time   • ondansetron (ZOFRAN) 4 MG tablet Take 4 mg by mouth Every 8 (Eight) Hours As Needed for Nausea or Vomiting.   Unknown at Unknown time   •  phenylephrine-cocoa Butter (Preparation H) 0.25-88.44 % suppository suppository Insert 1 suppository into the rectum Daily As Needed (Constipation).   Unknown at Unknown time   • primidone (MYSOLINE) 50 MG tablet Take 25 mg by mouth Daily.   Unknown at Unknown time   • sertraline (ZOLOFT) 50 MG tablet Take 50 mg by mouth Daily.   Unknown at Unknown time   • tiotropium (SPIRIVA) 18 MCG per inhalation capsule Place 1 capsule into inhaler and inhale Daily.   Unknown at Unknown time   • vitamin B-12 (CYANOCOBALAMIN) 1000 MCG tablet Take 1,000 mcg by mouth Daily.   Unknown at Unknown time   , Scheduled Meds:  atorvastatin, 40 mg, Oral, Daily  cholecalciferol, 1,000 Units, Oral, Daily  docusate sodium, 100 mg, Oral, Daily  donepezil, 10 mg, Oral, Nightly  gabapentin, 100 mg, Oral, BID  HYDROcodone-acetaminophen, 1 tablet, Oral, BID  insulin lispro, 0-9 Units, Subcutaneous, TID AC  insulin NPH-insulin regular, 10 Units, Subcutaneous, Daily With Breakfast  ipratropium-albuterol, 3 mL, Nebulization, 4x Daily - RT  metoprolol tartrate, 37.5 mg, Oral, BID  midodrine, 10 mg, Oral, TID AC  neomycin-bacitracin-polymyxin, 1 application, Topical, Nightly  pantoprazole, 40 mg, Oral, QAM AC  primidone, 25 mg, Oral, Daily  sertraline, 50 mg, Oral, Daily  sodium chloride, 10 mL, Intravenous, Q12H  vitamin B-12, 1,000 mcg, Oral, Daily    , Continuous Infusions:   , PRN Meds:  •  albuterol  •  bisacodyl  •  dextrose  •  dextrose  •  glucagon (human recombinant)  •  insulin lispro **AND** insulin lispro  •  melatonin  •  nitroglycerin  •  ondansetron  •  ondansetron  •  [COMPLETED] Insert peripheral IV **AND** sodium chloride  •  sodium chloride and Allergies:  Codeine    Review of Systems  Pertinent items are noted in HPI    Objective     Vital Signs  Temp:  [97.8 °F (36.6 °C)-97.9 °F (36.6 °C)] 97.8 °F (36.6 °C)  Heart Rate:  [] 105  Resp:  [14-20] 20  BP: (110-133)/(64-70) 133/67    No intake/output data recorded.  No  intake/output data recorded.    Physical Exam:  GEN: Awake, NAD  ENT: PERRL, EOMI, MMM  NECK: Supple, no JVD  CHEST: CTAB, no W/R/C  CV: RRR, no M/G/R  ABD: Soft, NT, +BS  SKIN: Warm and Dry  NEURO: CN's intact      Results Review:   I reviewed the patient's new clinical results.    WBC WBC   Date Value Ref Range Status   03/12/2022 7.20 3.40 - 10.80 10*3/mm3 Final      HGB Hemoglobin   Date Value Ref Range Status   03/12/2022 10.5 (L) 13.0 - 17.7 g/dL Final      HCT Hematocrit   Date Value Ref Range Status   03/12/2022 32.9 (L) 37.5 - 51.0 % Final      Platlets No results found for: LABPLAT   MCV MCV   Date Value Ref Range Status   03/12/2022 95.5 79.0 - 97.0 fL Final          Sodium Sodium   Date Value Ref Range Status   03/13/2022 132 (L) 136 - 145 mmol/L Final   03/12/2022 132 (L) 136 - 145 mmol/L Final      Potassium Potassium   Date Value Ref Range Status   03/13/2022 5.2 3.5 - 5.2 mmol/L Final   03/12/2022 4.7 3.5 - 5.2 mmol/L Final      Chloride Chloride   Date Value Ref Range Status   03/13/2022 95 (L) 98 - 107 mmol/L Final   03/12/2022 97 (L) 98 - 107 mmol/L Final      CO2 CO2   Date Value Ref Range Status   03/13/2022 22.0 22.0 - 29.0 mmol/L Final   03/12/2022 24.0 22.0 - 29.0 mmol/L Final      BUN BUN   Date Value Ref Range Status   03/13/2022 30 (H) 8 - 23 mg/dL Final   03/12/2022 24 (H) 8 - 23 mg/dL Final      Creatinine Creatinine   Date Value Ref Range Status   03/13/2022 3.64 (H) 0.76 - 1.27 mg/dL Final   03/12/2022 3.62 (H) 0.76 - 1.27 mg/dL Final      Calcium Calcium   Date Value Ref Range Status   03/13/2022 8.6 8.6 - 10.5 mg/dL Final   03/12/2022 8.7 8.6 - 10.5 mg/dL Final      PO4 No results found for: CAPO4   Albumin Albumin   Date Value Ref Range Status   03/12/2022 3.40 (L) 3.50 - 5.20 g/dL Final      Magnesium No results found for: MG   Uric Acid No results found for: URICACID         atorvastatin, 40 mg, Oral, Daily  cholecalciferol, 1,000 Units, Oral, Daily  docusate sodium, 100 mg, Oral,  Daily  donepezil, 10 mg, Oral, Nightly  gabapentin, 100 mg, Oral, BID  HYDROcodone-acetaminophen, 1 tablet, Oral, BID  insulin lispro, 0-9 Units, Subcutaneous, TID AC  insulin NPH-insulin regular, 10 Units, Subcutaneous, Daily With Breakfast  ipratropium-albuterol, 3 mL, Nebulization, 4x Daily - RT  metoprolol tartrate, 37.5 mg, Oral, BID  midodrine, 10 mg, Oral, TID AC  neomycin-bacitracin-polymyxin, 1 application, Topical, Nightly  pantoprazole, 40 mg, Oral, QAM AC  primidone, 25 mg, Oral, Daily  sertraline, 50 mg, Oral, Daily  sodium chloride, 10 mL, Intravenous, Q12H  vitamin B-12, 1,000 mcg, Oral, Daily           Assessment/Plan     1) ESRD  2) Chronic hypotension  3) COPD exacerbation  4) CHF  5) Anemia of CKD    PLAN: Continue MWF HD with UF as tolerated.  Continue Midodrine.  Rx for COPD exacerbation per primary.  Will follow.        Yony Bhat MD   [facility]  03/13/22  @NOW

## 2022-03-13 NOTE — H&P
"    [facility] Medicine Services      Patient Name: Benson Mclean  : 1950  MRN: [id number]  Primary Care Physician:  Rudolph Rooney MD  Date of admission: 3/12/2022      Subjective      Chief Complaint: Shortness of breath and wheezing.    History of Present Illness:   71-year-old  male, morbidly obese with extensive medical history as outlined below among which are ESRD [dialysis on Monday/Wednesday/], chronic systolic CHF, CAD-s/p CABG and stent placement, chronic A. fib, stroke with residual right-sided weakness, and O2 dependent COPD, presented to the ED via EMS coming from [facility] rehab facility for complaint of trouble breathing.  He was discharged from our hospital 2 days ago after staying 3 days treating him for hypotension following dialysis beside acute on chronic systolic CHF.  He was sent to the [facility] for rehab.  Patient states that he is supposed to get breathing treatments but was told by the nurse at [facility] that they are not allowed to give breathing treatment!  He denies having chest pain, cough, fever or chills, palpitation, nausea vomiting or diarrhea or any other complaint.    Emergency department course:  Afebrile, hemodynamically stable, normokalemic on 4 L nasal cannula, no acute distress.  Physical examination per ED NP was significant for decreased breath sounds presence of wheezes and leg edema.  Labs were significant for proBNP more than 70,000 [up from 7648 on 2021], creatinine 3.62, BUN 24, GFR 17.2 and hemoglobin 10.5.  EKG showed atrial fibrillation with left BBB.  Chest x-ray reported \"Overall similar appearance of the chest compared to the prior exam with bilateral pleural fluid collections, low lung volumes, and large cardiac silhouette. Hazy opacities in both lower lung zones could be due to atelectasis or pneumonia \".  Patient felt better after giving him DuoNeb, 125 mg Solu-Medrol, and 80 mg IV Lasix.    ROS   Please " refer to HPI. All other systems were reviewed and were negative.     Personal History     Past Medical History:   Diagnosis Date   • A-fib ([facility]) 8/3/2021   • Anemia    • Appetite loss    • Arthritis    • Brain bleed ([facility])    • Carpal tunnel syndrome on left    • CHF (congestive heart failure) ([facility])    • Chronic left-sided low back pain without sciatica 12/27/2017   • CKD (chronic kidney disease) stage 3, GFR 30-59 ml/min ([facility])    • Closed fracture of seventh thoracic vertebra ([facility]) 8/23/2020   • Cognitive communication deficit 12/1/2020   • COPD (chronic obstructive pulmonary disease) ([facility])    • Coronary artery disease    • COVID-19 2/19/2021   • Cytokine release syndrome, grade 1 5/24/2021   • Depression    • Diabetic neuropathy ([facility])    • Dialysis patient ([facility])     mon wed fri   • DM2 (diabetes mellitus, type 2) ([facility])    • GERD (gastroesophageal reflux disease)    • Hyperlipidemia    • Hypertension    • Hypogonadism in male    • Neuropathy    • Nonsustained ventricular tachycardia ([facility]) 7/23/2021   • Obesity    • Paroxysmal atrial fibrillation ([facility]) 12/1/2020   • Sleep apnea    • Stroke ([facility])    • Unsteady gait        Past Surgical History:   Procedure Laterality Date   • ANGIOPLASTY      X2   • APPENDECTOMY     • ARTERIOVENOUS FISTULA/SHUNT SURGERY Left 1/20/2022    Procedure: LEFT BRACHIAL CEPHALIC ARTERIAL VENOUS FISTULA CREATION;  Surgeon: Yony Dvaila MD;  Location: [facility] MAIN OR;  Service: Vascular;  Laterality: Left;   • CARDIAC CATHETERIZATION  11/13/2015   • CARDIAC CATHETERIZATION N/A 5/24/2021    Procedure: Left Heart Cath and coronary angiogram;  Surgeon: Jose G Rm MD;  Location: [facility] CATH INVASIVE LOCATION;  Service: Cardiovascular;  Laterality: N/A;   • CARDIAC CATHETERIZATION  5/24/2021    Procedure: Saphenous Vein Graft;  Surgeon: Jose G Rm MD;  Location: [facility] CATH INVASIVE LOCATION;  Service: Cardiovascular;;   • CARDIAC CATHETERIZATION N/A 5/24/2021    Procedure:  Percutaneous Coronary Intervention;  Surgeon: Bernabe Zambrano MD;  Location:  [facility] CATH INVASIVE LOCATION;  Service: Cardiology;  Laterality: N/A;   • CARDIAC CATHETERIZATION N/A 5/24/2021    Procedure: Stent NIELS coronary;  Surgeon: Bernabe Zambrano MD;  Location:  [facility] CATH INVASIVE LOCATION;  Service: Cardiology;  Laterality: N/A;   • CARDIAC CATHETERIZATION N/A 5/26/2021    Procedure: Percutaneous Coronary Intervention;  Surgeon: Bernabe Zambrano MD;  Location:  [facility] CATH INVASIVE LOCATION;  Service: Cardiovascular;  Laterality: N/A;   • CARDIAC CATHETERIZATION N/A 5/26/2021    Procedure: Stent NIELS bypass graft;  Surgeon: Bernabe Zambrano MD;  Location:  [facility] CATH INVASIVE LOCATION;  Service: Cardiovascular;  Laterality: N/A;   • CARDIAC ELECTROPHYSIOLOGY PROCEDURE N/A 5/24/2021    Procedure: Temporary Pacemaker;  Surgeon: Bernabe Zambrano MD;  Location:  [facility] CATH INVASIVE LOCATION;  Service: Cardiology;  Laterality: N/A;   • CARDIAC ELECTROPHYSIOLOGY PROCEDURE N/A 5/26/2021    Procedure: Temporary Pacemaker;  Surgeon: Bernabe Zambrano MD;  Location: [facility] CATH INVASIVE LOCATION;  Service: Cardiovascular;  Laterality: N/A;   • CARPAL TUNNEL RELEASE Left 04/29/2018    carpal tunnel- lt hand// other hand surgeries    • CATARACT EXTRACTION, BILATERAL  2002    Dr. Lux Acosta   • CHOLECYSTECTOMY     • COLON RESECTION  2005   • CORONARY ANGIOPLASTY     • CORONARY ANGIOPLASTY WITH STENT PLACEMENT  11/13/2015    PTCA stent to proximal in stent and mid to distal lad   • CORONARY ANGIOPLASTY WITH STENT PLACEMENT  09/16/2016    PTCA stent to mid lad and stent to vein graft to marginal   • CORONARY ARTERY BYPASS GRAFT  2005    @ [facility]   • CYST REMOVAL      cyst removed from scrotum   • FOOT SURGERY Right 07/17/2018   • FOOT SURGERY Left 02/04/2019   • PORTACATH PLACEMENT     • TOE AMPUTATION Right     right toe removed D/T infected cut that went to the bone       Family History: family history includes Diabetes in his brother and  sister; Heart disease in his father, mother, and sister; Mental illness in his brother. Otherwise pertinent FHx was reviewed and not pertinent to current issue.    Social History:  reports that he has quit smoking. His smoking use included cigarettes. He has a 60.00 pack-year smoking history. He has never used smokeless tobacco. He reports current drug use. Frequency: 1.00 time per week. Drug: Marijuana. He reports that he does not drink alcohol.    Home Medications:  Prior to Admission Medications     Prescriptions Last Dose Informant Patient Reported? Taking?    atorvastatin (LIPITOR) 40 MG tablet   Yes No    Take 40 mg by mouth Daily.    bisacodyl (Dulcolax) 10 MG suppository   Yes No    Insert 10 mg into the rectum Daily As Needed for Constipation.    cholecalciferol (VITAMIN D3) 25 MCG (1000 UT) tablet   Yes No    Take 1,000 Units by mouth Daily.    docusate sodium (COLACE) 100 MG capsule   Yes No    Take 100 mg by mouth Daily.    donepezil (ARICEPT) 10 MG tablet   Yes No    Take 10 mg by mouth Every Night.    fluticasone (FLONASE) 50 MCG/ACT nasal spray   Yes No    2 sprays by Each Nare route 2 (Two) Times a Day.    gabapentin (NEURONTIN) 100 MG capsule   Yes No    Take 100 mg by mouth 2 (Two) Times a Day.    Glucagon, rDNA, (Glucagon Emergency) 1 MG kit   Yes No    Inject 1 mg into the appropriate muscle as directed by prescriber Daily As Needed (Low blood sugar).    HYDROcodone-acetaminophen (NORCO) 7.5-325 MG per tablet   Yes No    Take 1 tablet by mouth 2 (Two) Times a Day.    Insulin NPH Isophane & Regular (HumuLIN 70/30 KwikPen) (70-30) 100 UNIT/ML suspension pen-injector   Yes No    Inject 10 Units under the skin into the appropriate area as directed Daily.    magnesium hydroxide (MILK OF MAGNESIA) 400 MG/5ML suspension   Yes No    Take 15 mL by mouth Daily As Needed for Constipation.    melatonin 3 MG tablet   Yes No    Take 3 mg by mouth Daily.    Metoprolol Tartrate 37.5 MG tablet   Yes No    Take  37.5 mg by mouth 2 (Two) Times a Day. Hold for SBP <110 or HR < 60    midodrine (PROAMATINE) 10 MG tablet   Yes No    Take 10 mg by mouth 3 (Three) Times a Day Before Meals. Hold for /90    neomycin-bacitracin-polymyxin (NEOSPORIN) 5-400-5000 ointment   Yes No    Apply 1 application topically to the appropriate area as directed Every Night. Apply to abrasions on right inner thigh and intact blister on left inner thigh    Nitroglycerin 400 MCG pack   Yes No    Place 1 packet under the tongue Daily As Needed (Chest pain).    omeprazole (priLOSEC) 20 MG capsule   Yes No    Take 20 mg by mouth Daily.    ondansetron (ZOFRAN) 4 MG tablet   Yes No    Take 4 mg by mouth Every 8 (Eight) Hours As Needed for Nausea or Vomiting.    phenylephrine-cocoa Butter (Preparation H) 0.25-88.44 % suppository suppository   Yes No    Insert 1 suppository into the rectum Daily As Needed (Constipation).    primidone (MYSOLINE) 50 MG tablet   Yes No    Take 25 mg by mouth Daily.    sertraline (ZOLOFT) 50 MG tablet   Yes No    Take 50 mg by mouth Daily.    tiotropium (SPIRIVA) 18 MCG per inhalation capsule   Yes No    Place 1 capsule into inhaler and inhale Daily.    vitamin B-12 (CYANOCOBALAMIN) 1000 MCG tablet   Yes No    Take 1,000 mcg by mouth Daily.            Allergies:  Allergies   Allergen Reactions   • Codeine Itching       Objective      Vitals:   Temp:  [97.9 °F (36.6 °C)] 97.9 °F (36.6 °C)  Heart Rate:  [] 99  Resp:  [14-18] 18  BP: (110-127)/(64-69) 122/69  Flow (L/min):  [3-4] 3    Physical Exam   General: Morbidly obese, alert and oriented x3, no acute distress.   Eyes:  Show anicteric sclerae, moist conjunctivae with no lig lag; PERRLA.  HENT:  Normocephalic, atraumatic, moist oral mucosa.  Neck: Short and thick, no bruit, no JVP, no thyroid or lymph node enlargement, trachea central, dialysis catheter at the right upper anterior chest  Lungs: Decreased breath sounds.  No rhonchi or crepitations.  Heart: RRR, no  murmur or rub.   Abdomen:  Soft, not tender, not distended, no organomegaly, bowel sounds positive.   Extremities: No leg edema or joint swelling, no calf tenderness, normal range of movement, pedal pulses intact.  AV fistula in the left arm.  Skin: Multiple ecchymosis in both upper extremities.  Normal texture and turgor.  Neurology: Right-sided hemiparesis.  Psychiatric exam: Pleasant, cooperative, appropriate mood and affect, intact judgment and insight.      Result Review    Result Review:  I have personally reviewed the results from the time of this admission to 3/12/2022 23:54 EST and agree with these findings:  [x]  Laboratory  [x]  Microbiology  [x]  Radiology  [x]  EKG/Telemetry   []  Cardiology/Vascular   []  Pathology  [x]  Old records  []  Other:    Assessment/Plan        Active Hospital Problems:  Active Hospital Problems    Diagnosis    • **Fluid overload    • COPD with acute exacerbation (HCC)      Assessment:  1.  Fluid overload-likely multifactorial including ESRD and CHF.    2.  COPD exacerbation.    3.  ESRD on hemodialysis.    4.  Chronic systolic CHF.    5.  CAD-s/p CABG and stent placement.    6.  Chronic atrial fibrillation-rate controlled on metoprolol.  -Not on anticoagulation likely 2/2 Hx of brain bleed.    7.  Hx of stroke with residual right-sided weakness.    8.  IDDM with peripheral neuropathy.    9.  Hyperlipidemia.    10.  ANNETTE.    11.  GERD.    12.  Anemia of CKD.    13.  Morbid obesity.    Plan:  -Observation with telemetry.  -Consult nephrology for HD management and fluid overload.  -Consult patient's cardiologist Dr. Zambrano.  -2D echocardiogram.  -Continue supplemental O2, inhalers and nebulizers.  -Fluid restriction, renal diet, avoid nephrotoxic medications.  Monitor hemodynamic status, renal function, and electrolyte levels.  -Continue patient's home medications.  Accu-Chek with ISS.  -DVT prophylaxis with bilateral SCD.  -Consult  for placing the patient in a  different ECF.    CODE STATUS:    Level Of Support Discussed With: Patient  Code Status (Patient has no pulse and is not breathing): CPR (Attempt to Resuscitate)  Medical Interventions (Patient has pulse or is breathing): Full Support    Admission Status:  I believe this patient meets observation status.    I discussed the patient's findings and my recommendations with patient.    This patient has been examined wearing appropriate Personal Protective Equipment and discussed with hospital infection control department. 03/12/22      Signature: Electronically signed by Yordan Hackett MD, 03/13/22, 12:08 AM EST.

## 2022-03-13 NOTE — PLAN OF CARE
Goal Outcome Evaluation:           Patient is Aox4. Assessed patient some bruising and a sheet on thigh, cleaned and applied powder. Patient has crackled lung sounds with productive cough. Patient answers all questions and family is involved. No new nursing concerns.

## 2022-03-13 NOTE — NURSING NOTE
Patient was calm and cooperative. AOx4 with HR elevated. Lung sounds w/ wheezes with trace edema in lower extremeties. No complaints during shift. Will continue to monitor.    1.7

## 2022-03-14 PROBLEM — E66.9 OBESITY (BMI 30-39.9): Status: ACTIVE | Noted: 2022-01-01

## 2022-03-14 PROBLEM — Z99.81 CHRONIC RESPIRATORY FAILURE WITH HYPOXIA, ON HOME OXYGEN THERAPY (HCC): Status: ACTIVE | Noted: 2022-01-01

## 2022-03-14 PROBLEM — J96.11 CHRONIC RESPIRATORY FAILURE WITH HYPOXIA, ON HOME OXYGEN THERAPY (HCC): Status: ACTIVE | Noted: 2022-01-01

## 2022-03-14 NOTE — PROGRESS NOTES
[facility] Medicine Services Daily Progress Note    Patient Name: Benson Mclean  : 1950  MRN: [id number]  Primary Care Physician:  Rudolph Rooney MD  Date of admission: 3/12/2022      Subjective      Chief Complaint: Shortness of breath      Patient Reports no new complaints.    Review of Systems   All other systems reviewed and are negative.         Objective      Vitals:   Temp:  [97.8 °F (36.6 °C)-98.5 °F (36.9 °C)] 98.1 °F (36.7 °C)  Heart Rate:  [] 93  Resp:  [10-22] 10  BP: (106-133)/(56-75) 106/61  Flow (L/min):  [3] 3    Physical Exam   General: well-developed and well-nourished, NAD  HEENT: NC/AT, EOMI, PERRLA  Heart: RRR. No murmur   Chest: CTAB, no w/r/r, normal respiratory effort  Abdominal: Soft. NT/ND. Bowel sounds present  Musculoskeletal: Normal ROM.  No edema. No calf tenderness.  Neurological: AAOx3, no focal deficits  Skin: Skin is warm and dry. No rash  Psychiatric: Normal mood and affect.     Result Review    Result Review:  I have personally reviewed the results from the time of this admission to 3/14/2022 03:40 EDT and agree with these findings:  [x]  Laboratory  [x]  Microbiology  [x]  Radiology  [x]  EKG/Telemetry   [x]  Cardiology/Vascular   []  Pathology  []  Old records  []  Other:  Most notable findings include:     Wounds (last 24 hours)     LDA Wound     Row Name 22 0009 22 1120       Wound 22 1806 Left anterior thigh Burn    Wound - Properties Group Placement Date: 22  -MG Placement Time: 1806  -MG Side: Left  -MG Orientation: anterior  -MG Location: thigh  -MG Primary Wound Type: Burn  -KK    Edges irregular  -CR irregular  -CR irregular  -SM    Drainage Characteristics/Odor serosanguineous  -CR serosanguineous  -CR serosanguineous  -SM    Drainage Amount scant  -CR scant  -CR scant  -SM    Retired Wound - Properties Group Placement Date: 22  -MG Placement Time: 1806  -MG Side: Left  -MG  Orientation: anterior  -MG Location: thigh  -MG Primary Wound Type: Burn  -KK    Retired Wound - Properties Group Date first assessed: 03/07/22  -MG Time first assessed: 1806  -MG Side: Left  -MG Location: thigh  -MG Primary Wound Type: Burn  -KK       Wound 03/07/22 1805 Right anterior thigh Burn    Wound - Properties Group Placement Date: 03/07/22  -MG Placement Time: 1805  -MG Present on Hospital Admission: Y  -MG Side: Right  -MG Orientation: anterior  -MG Location: thigh  -MG Primary Wound Type: Burn  -KK    Edges irregular  -CR irregular  -CR irregular  -SM    Drainage Characteristics/Odor serosanguineous  -CR serosanguineous  -CR serosanguineous  -SM    Drainage Amount scant  -CR scant  -CR scant  -SM    Retired Wound - Properties Group Placement Date: 03/07/22  -MG Placement Time: 1805  -MG Present on Hospital Admission: Y  -MG Side: Right  -MG Orientation: anterior  -MG Location: thigh  -MG Primary Wound Type: Burn  -KK    Retired Wound - Properties Group Date first assessed: 03/07/22  -MG Time first assessed: 1805  -MG Present on Hospital Admission: Y  -MG Side: Right  -MG Location: thigh  -MG Primary Wound Type: Burn  -KK    Row Name 03/13/22 0730             Wound 03/07/22 1806 Left anterior thigh Burn    Wound - Properties Group Placement Date: 03/07/22  -MG Placement Time: 1806  -MG Side: Left  -MG Orientation: anterior  -MG Location: thigh  -MG Primary Wound Type: Burn  -KK      Dressing Appearance open to air  -SM      Edges irregular  -SM      Drainage Characteristics/Odor serosanguineous  -SM      Drainage Amount scant  -SM      Care, Wound cleansed with;soap and water  -SM      Retired Wound - Properties Group Placement Date: 03/07/22  -MG Placement Time: 1806  -MG Side: Left  -MG Orientation: anterior  -MG Location: thigh  -MG Primary Wound Type: Burn  -KK      Retired Wound - Properties Group Date first assessed: 03/07/22  -MG Time first assessed: 1806  -MG Side: Left  -MG Location: thigh  -MG  Primary Wound Type: Burn  -KK              Wound 03/07/22 1805 Right anterior thigh Burn    Wound - Properties Group Placement Date: 03/07/22  -MG Placement Time: 1805  -MG Present on Hospital Admission: Y  -MG Side: Right  -MG Orientation: anterior  -MG Location: thigh  -MG Primary Wound Type: Burn  -KK      Dressing Appearance open to air  -SM      Edges irregular  -SM      Drainage Characteristics/Odor serosanguineous  -SM      Drainage Amount scant  -SM      Care, Wound cleansed with;soap and water  -SM      Retired Wound - Properties Group Placement Date: 03/07/22  -MG Placement Time: 1805  -MG Present on Hospital Admission: Y  -MG Side: Right  -MG Orientation: anterior  -MG Location: thigh  -MG Primary Wound Type: Burn  -KK      Retired Wound - Properties Group Date first assessed: 03/07/22  -MG Time first assessed: 1805  -MG Present on Hospital Admission: Y  -MG Side: Right  -MG Location: thigh  -MG Primary Wound Type: Burn  -KK            User Key  (r) = Recorded By, (t) = Taken By, (c) = Cosigned By    Initials Name Provider Type    CR Ayden Henao, RN Registered Nurse    Ashia Castaneda RN Registered Nurse    Radha Quispe RN Registered Nurse     Leona Draper RN Registered Nurse                  Assessment/Plan      Brief Patient Summary:  Benson Mclean is a 71 y.o. male who       atorvastatin, 40 mg, Oral, Daily  cholecalciferol, 1,000 Units, Oral, Daily  docusate sodium, 100 mg, Oral, Daily  donepezil, 10 mg, Oral, Nightly  gabapentin, 100 mg, Oral, BID  HYDROcodone-acetaminophen, 1 tablet, Oral, BID  insulin lispro, 0-9 Units, Subcutaneous, TID AC  insulin NPH-insulin regular, 10 Units, Subcutaneous, Daily With Breakfast  ipratropium-albuterol, 3 mL, Nebulization, 4x Daily - RT  metoprolol tartrate, 37.5 mg, Oral, BID  midodrine, 10 mg, Oral, TID AC  neomycin-bacitracin-polymyxin, 1 application, Topical, Nightly  pantoprazole, 40 mg, Oral, QAM AC  primidone, 25 mg, Oral,  Daily  sertraline, 50 mg, Oral, Daily  sodium chloride, 10 mL, Intravenous, Q12H  vitamin B-12, 1,000 mcg, Oral, Daily             Active Hospital Problems:  Active Hospital Problems    Diagnosis    • **Fluid overload    • COPD with acute exacerbation (HCC)      Plan:    Fluid overload-likely multifactorial including ESRD and CHF.  --diurese as tolerated vs UF during dialysis     COPD exacerbation.  --supplemental O2 to keep sats > 92%  --Duonebs  --mucolytic  --steroids    ESRD on hemodialysis.  --Neprhology following for dialysis instructions  --continue to monitor BP and vitals     Chronic systolic CHF.  --acute on chronic as noted above  --continue  betablocker     CAD-s/p CABG and stent placement.  --continue ASA, statin, betablocker     Chronic atrial fibrillation-rate controlled on metoprolol.  -Not on anticoagulation likely 2/2 Hx of brain bleed.     Hx of stroke with residual right-sided weakness.     IDDM with peripheral neuropathy.  --sliding scale insulin  --AccuChek AC/HS     Hyperlipidemia.  --continue statin     ANNETTE.   --noncompliant with Cpap    GERD.   --continue PPI     Anemia of CKD.  --continue to monitor Hgb      Morbid obesity.    DVT prophylaxis:  Mechanical DVT prophylaxis orders are present.    CODE STATUS:    Level Of Support Discussed With: Patient  Code Status (Patient has no pulse and is not breathing): CPR (Attempt to Resuscitate)  Medical Interventions (Patient has pulse or is breathing): Full Support      Disposition:  I expect patient to be discharged pending clinical results and consultant recommendations.    This patient has been examined wearing appropriate Personal Protective Equipment. 03/14/22    Electronically signed by Mari Brenal MD, 03/14/22, 03:40 EDT.  [facility] Hospitalist Team

## 2022-03-14 NOTE — PROGRESS NOTES
[facility] Medicine Services Daily Progress Note    Patient Name: Benson Mclean  : 1950  MRN: [id number]  Primary Care Physician:  Rudolph Rooney MD  Date of admission: 3/12/2022      Subjective      Chief Complaint: Shortness of breath with wheezing      Patient Reports   3/14/2022: Patient reports feeling somewhat better.    ROS   12 point review of systems negative including no complaints of chest pain or lightheadedness, swelling or shortness of breath      Objective      Vitals:   Temp:  [97.4 °F (36.3 °C)-98.5 °F (36.9 °C)] 98.1 °F (36.7 °C)  Heart Rate:  [] 111  Resp:  [10-22] 12  BP: (106-122)/(56-75) 114/63  Flow (L/min):  [3] 3    Physical Exam   Vital signs and nurses notes reviewed.  Morbidly obese gentleman sitting up in bed awake and alert in no acute distress; sclera anicteric, mucous membranes moist; dialysis catheter right anterior chest; lungs clear to auscultation with decreased air entry bilaterally; CV regular rate and rhythm; abdomen soft, nondistended and nontender; extremities with AV fistula left arm; no lower extremity edema, cyanosis or calf tenderness.       Result Review    Result Review:  I have personally reviewed the results from the time of this admission to 3/14/2022 13:24 EDT and agree with these findings:  [x]  Laboratory  [x]  Microbiology  [x]  Radiology  [x]  EKG/Telemetry   [x]  Cardiology/Vascular   []  Pathology  []  Old records  []  Other:  Most notable findings discussed in the assessment and plan.    Wounds (last 24 hours)     LDA Wound     Row Name 22 1200 22 0406 22 0009       Wound 22 180 Left anterior thigh Burn    Wound - Properties Group Placement Date: 22  -MG Placement Time: 180  -MG Side: Left  -MG Orientation: anterior  -MG Location: thigh  -MG Primary Wound Type: Burn  -KK    Dressing Appearance open to air  -SM -- --    Edges irregular  -SM irregular  -CR irregular  -CR    Drainage  Characteristics/Odor serosanguineous  -SM serosanguineous  -CR serosanguineous  -CR    Drainage Amount scant  -SM scant  -CR scant  -CR    Care, Wound cleansed with;soap and water  -SM -- --    Retired Wound - Properties Group Placement Date: 03/07/22  -MG Placement Time: 1806  -MG Side: Left  -MG Orientation: anterior  -MG Location: thigh  -MG Primary Wound Type: Burn  -KK    Retired Wound - Properties Group Date first assessed: 03/07/22  -MG Time first assessed: 1806  -MG Side: Left  -MG Location: thigh  -MG Primary Wound Type: Burn  -KK       Wound 03/07/22 1805 Right anterior thigh Burn    Wound - Properties Group Placement Date: 03/07/22  -MG Placement Time: 1805  -MG Present on Hospital Admission: Y  -MG Side: Right  -MG Orientation: anterior  -MG Location: thigh  -MG Primary Wound Type: Burn  -KK    Edges irregular  -SM irregular  -CR irregular  -CR    Drainage Characteristics/Odor serosanguineous  -SM serosanguineous  -CR serosanguineous  -CR    Drainage Amount scant  -SM scant  -CR scant  -CR    Care, Wound cleansed with;soap and water  -SM -- --    Retired Wound - Properties Group Placement Date: 03/07/22  -MG Placement Time: 1805  -MG Present on Hospital Admission: Y  -MG Side: Right  -MG Orientation: anterior  -MG Location: thigh  -MG Primary Wound Type: Burn  -KK    Retired Wound - Properties Group Date first assessed: 03/07/22  -MG Time first assessed: 1805  -MG Present on Hospital Admission: Y  -MG Side: Right  -MG Location: thigh  -MG Primary Wound Type: Burn  -KK    Row Name 03/13/22 2006             Wound 03/07/22 1806 Left anterior thigh Burn    Wound - Properties Group Placement Date: 03/07/22  -MG Placement Time: 1806  -MG Side: Left  -MG Orientation: anterior  -MG Location: thigh  -MG Primary Wound Type: Burn  -KK      Edges irregular  -CR      Drainage Characteristics/Odor serosanguineous  -CR      Drainage Amount scant  -CR      Retired Wound - Properties Group Placement Date: 03/07/22  -MG  Placement Time: 1806  -MG Side: Left  -MG Orientation: anterior  -MG Location: thigh  -MG Primary Wound Type: Burn  -KK      Retired Wound - Properties Group Date first assessed: 03/07/22  -MG Time first assessed: 1806  -MG Side: Left  -MG Location: thigh  -MG Primary Wound Type: Burn  -KK              Wound 03/07/22 1805 Right anterior thigh Burn    Wound - Properties Group Placement Date: 03/07/22  -MG Placement Time: 1805  -MG Present on Hospital Admission: Y  -MG Side: Right  -MG Orientation: anterior  -MG Location: thigh  -MG Primary Wound Type: Burn  -KK      Edges irregular  -CR      Drainage Characteristics/Odor serosanguineous  -CR      Drainage Amount scant  -CR      Retired Wound - Properties Group Placement Date: 03/07/22  -MG Placement Time: 1805  -MG Present on Hospital Admission: Y  -MG Side: Right  -MG Orientation: anterior  -MG Location: thigh  -MG Primary Wound Type: Burn  -KK      Retired Wound - Properties Group Date first assessed: 03/07/22  -MG Time first assessed: 1805  -MG Present on Hospital Admission: Y  -MG Side: Right  -MG Location: thigh  -MG Primary Wound Type: Burn  -KK            User Key  (r) = Recorded By, (t) = Taken By, (c) = Cosigned By    Initials Name Provider Type    Ayden Mcrae RN Registered Nurse    Ashia Castaneda RN Registered Nurse    Radha Quispe RN Registered Nurse    Leona Hung RN Registered Nurse                  Assessment/Plan      Brief Patient Summary:  Benson Mclean is a 71 y.o. male with a history of end-stage renal disease, chronic systolic congestive heart failure due to ischemic cardiomyopathy, CAD status post CABG and stents, chronic atrial fibrillation, CVA with residual right-sided weakness, and chronic hypoxic respiratory failure due to COPD who presented to the emergency room from [facility] where he was noted to be having trouble breathing.  proBNP was greater than 70,000.  The patient received Solu-Medrol, IV Lasix  and duo nebs in the emergency room and was improved.  Nephrology was consulted and hemodialysis was ordered.    Current inpatient medications include:  atorvastatin, 40 mg, Oral, Daily  cholecalciferol, 1,000 Units, Oral, Daily  docusate sodium, 100 mg, Oral, Daily  donepezil, 10 mg, Oral, Nightly  gabapentin, 100 mg, Oral, BID  guaiFENesin, 1,200 mg, Oral, Q12H  HYDROcodone-acetaminophen, 1 tablet, Oral, BID  insulin lispro, 0-9 Units, Subcutaneous, TID AC  insulin NPH-insulin regular, 10 Units, Subcutaneous, Daily With Breakfast  ipratropium-albuterol, 3 mL, Nebulization, 4x Daily - RT  metoprolol tartrate, 37.5 mg, Oral, BID  midodrine, 10 mg, Oral, TID AC  neomycin-bacitracin-polymyxin, 1 application, Topical, Nightly  pantoprazole, 40 mg, Oral, QAM AC  primidone, 25 mg, Oral, Daily  sertraline, 50 mg, Oral, Daily  sodium chloride, 10 mL, Intravenous, Q12H  vitamin B-12, 1,000 mcg, Oral, Daily             Active Hospital Problems:  Active Hospital Problems    Diagnosis    • **Fluid overload    • Chronic respiratory failure with hypoxia, on home oxygen therapy ([facility])    • Obesity (BMI 30-39.9)    • End stage renal disease ([facility])    • Type 2 diabetes mellitus with hyperglycemia ([facility])    • COPD with acute exacerbation ([facility])    • Major depressive disorder, recurrent, mild ([facility])    • Unspecified dementia with behavioral disturbance ([facility])    • Coronary artery disease    • Vitamin D deficiency      Plan:   Acute on chronic hypoxic respiratory failure multifactorial due to fluid overload due to missed dialysis in end-stage renal disease patient, acute on chronic CHF and COPD exacerbation    Acute exacerbation of COPD, mild  -Continue DuoNebs, guaifenesin, inhaled steroids     Acute on chronic systolic congestive heart failure secondary to ischemic cardiomyopathy and pulmonary hypertension  -Dialysis ordered for fluid removal    CAD status post CABG and stents  Hyperlipidemia  Chronic atrial fibrillation  Hypertension of  CKD  -Continue home statin and metoprolol  -No anticoagulation secondary to history of brain bleed    End-stage renal disease on hemodialysis  -Nephrology consulting    Anemia of CKD  -Hemoglobin currently stable at 10.0    Insulin-dependent diabetes mellitus with diabetic peripheral neuropathy  -Hemoglobin A1c 6.3 on 3/12/2022  -Continue home insulin and gabapentin  -SSI    Obstructive sleep apnea  -Encourage CPAP compliance    GERD  -Continue PPI    Morbid obesity  -Risk factor modification counseling    Dementia with behavioral disturbance  -Continue donepezil and sertraline    Tremor  -Continue primidone    Vitamin B12 MND deficiency  -Continue supplements    DVT prophylaxis:  Medical and mechanical DVT prophylaxis orders are present.    CODE STATUS:    Level Of Support Discussed With: Patient  Code Status (Patient has no pulse and is not breathing): CPR (Attempt to Resuscitate)  Medical Interventions (Patient has pulse or is breathing): Full Support      Disposition:  I expect patient to be discharged in 1 to 2 days if clinically improved.    This patient has been examined wearing appropriate Personal Protective Equipment and discussed with hospital infection control department. 03/14/22      Electronically signed by Kay Car MD, 03/14/22, 13:24 EDT.  [facility] Team

## 2022-03-14 NOTE — CASE MANAGEMENT/SOCIAL WORK
Continued Stay Note  DOREEN Amin     Patient Name: Benson Mclean  MRN: [id number]  Today's Date: 3/14/2022    Admit Date: 3/12/2022     Discharge Plan     Row Name 03/14/22 1621       Plan    Plan Unable to perform CM assessment    Plan Comments Patient was out of room most of the day for dialysis and CT . CM attempted twice to see patient            Phone communication or documentation only- no physical contact with patient or family.    Shanice Kemp RN     Office Phone: [phone]  Office Cell: [phone]

## 2022-03-14 NOTE — PROGRESS NOTES
Referring Provider: Kay Car MD    Reason for follow-up:  Congestive heart failure  Status post CABG  Status post stent     Patient Care Team:  Rudolph Rooney MD as PCP - General (Family Medicine)  Samantha Zabala APRN as PCP - Family Medicine  Jose G Rm MD as Consulting Physician (Cardiology)    Subjective .  Feeling okay     ROS    The patient has been without any chest discomfort ,shortness of breath, palpitations, dizziness or syncope.  Denies having any headache ,abdominal pain ,nausea, vomiting , diarrhea constipation, loss of weight or loss of appetite.  Denies having any excessive bruising ,hematuria or blood in the stool.    Review of all systems negative except as indicated    History  Past Medical History:   Diagnosis Date   • A-fib ([facility]) 8/3/2021   • Anemia    • Appetite loss    • Arthritis    • Brain bleed ([facility])    • Carpal tunnel syndrome on left    • CHF (congestive heart failure) ([facility])    • Chronic left-sided low back pain without sciatica 12/27/2017   • CKD (chronic kidney disease) stage 3, GFR 30-59 ml/min ([facility])    • Closed fracture of seventh thoracic vertebra ([facility]) 8/23/2020   • Cognitive communication deficit 12/1/2020   • COPD (chronic obstructive pulmonary disease) ([facility])    • Coronary artery disease    • COVID-19 2/19/2021   • Cytokine release syndrome, grade 1 5/24/2021   • Depression    • Diabetic neuropathy ([facility])    • Dialysis patient ([facility]ly [facility])     mon wed fri   • DM2 (diabetes mellitus, type 2) ([facility])    • GERD (gastroesophageal reflux disease)    • Hyperlipidemia    • Hypertension    • Hypogonadism in male    • Neuropathy    • Nonsustained ventricular tachycardia ([facility]) 7/23/2021   • Obesity    • Paroxysmal atrial fibrillation ([facility]) 12/1/2020   • Sleep apnea    • Stroke ([facility])    • Unsteady gait        Past Surgical History:   Procedure Laterality Date   • ANGIOPLASTY      X2   • APPENDECTOMY     • ARTERIOVENOUS FISTULA/SHUNT SURGERY Left 1/20/2022    Procedure: LEFT BRACHIAL  CEPHALIC ARTERIAL VENOUS FISTULA CREATION;  Surgeon: Yony Davila MD;  Location: [facility] MAIN OR;  Service: Vascular;  Laterality: Left;   • CARDIAC CATHETERIZATION  11/13/2015   • CARDIAC CATHETERIZATION N/A 5/24/2021    Procedure: Left Heart Cath and coronary angiogram;  Surgeon: Jose G Rm MD;  Location:  [facility] CATH INVASIVE LOCATION;  Service: Cardiovascular;  Laterality: N/A;   • CARDIAC CATHETERIZATION  5/24/2021    Procedure: Saphenous Vein Graft;  Surgeon: Jose G Rm MD;  Location:  [facility] CATH INVASIVE LOCATION;  Service: Cardiovascular;;   • CARDIAC CATHETERIZATION N/A 5/24/2021    Procedure: Percutaneous Coronary Intervention;  Surgeon: Bernabe Zambrano MD;  Location: [facility] CATH INVASIVE LOCATION;  Service: Cardiology;  Laterality: N/A;   • CARDIAC CATHETERIZATION N/A 5/24/2021    Procedure: Stent NIELS coronary;  Surgeon: Bernabe Zambrano MD;  Location: [facility] CATH INVASIVE LOCATION;  Service: Cardiology;  Laterality: N/A;   • CARDIAC CATHETERIZATION N/A 5/26/2021    Procedure: Percutaneous Coronary Intervention;  Surgeon: Bernabe Zambrano MD;  Location:  [facility] CATH INVASIVE LOCATION;  Service: Cardiovascular;  Laterality: N/A;   • CARDIAC CATHETERIZATION N/A 5/26/2021    Procedure: Stent NIELS bypass graft;  Surgeon: Bernabe Zambrano MD;  Location:  [facility] CATH INVASIVE LOCATION;  Service: Cardiovascular;  Laterality: N/A;   • CARDIAC ELECTROPHYSIOLOGY PROCEDURE N/A 5/24/2021    Procedure: Temporary Pacemaker;  Surgeon: Bernabe Zambrano MD;  Location: [facility] CATH INVASIVE LOCATION;  Service: Cardiology;  Laterality: N/A;   • CARDIAC ELECTROPHYSIOLOGY PROCEDURE N/A 5/26/2021    Procedure: Temporary Pacemaker;  Surgeon: Bernabe Zambrano MD;  Location: [facility] CATH INVASIVE LOCATION;  Service: Cardiovascular;  Laterality: N/A;   • CARPAL TUNNEL RELEASE Left 04/29/2018    carpal tunnel- lt hand// other hand surgeries    • CATARACT EXTRACTION, BILATERAL  2002    Dr. Lux Acosta   • CHOLECYSTECTOMY     •  COLON RESECTION  2005   • CORONARY ANGIOPLASTY     • CORONARY ANGIOPLASTY WITH STENT PLACEMENT  11/13/2015    PTCA stent to proximal in stent and mid to distal lad   • CORONARY ANGIOPLASTY WITH STENT PLACEMENT  09/16/2016    PTCA stent to mid lad and stent to vein graft to marginal   • CORONARY ARTERY BYPASS GRAFT  2005    @ [facility]   • CYST REMOVAL      cyst removed from scrotum   • FOOT SURGERY Right 07/17/2018   • FOOT SURGERY Left 02/04/2019   • PORTACATH PLACEMENT     • TOE AMPUTATION Right     right toe removed D/T infected cut that went to the bone       Family History   Problem Relation Age of Onset   • Heart disease Mother    • Heart disease Father    • Diabetes Sister    • Heart disease Sister    • Diabetes Brother    • Mental illness Brother        Social History     Tobacco Use   • Smoking status: Former Smoker     Packs/day: 1.00     Years: 60.00     Pack years: 60.00     Types: Cigarettes   • Smokeless tobacco: Never Used   • Tobacco comment: Patient quit smoking 11/2020   Vaping Use   • Vaping Use: Never used   Substance Use Topics   • Alcohol use: No   • Drug use: Yes     Frequency: 1.0 times per week     Types: Marijuana     Comment: socially        Medications Prior to Admission   Medication Sig Dispense Refill Last Dose   • atorvastatin (LIPITOR) 40 MG tablet Take 40 mg by mouth Daily.   Unknown at Unknown time   • bisacodyl (DULCOLAX) 10 MG suppository Insert 10 mg into the rectum Daily As Needed for Constipation.   Unknown at Unknown time   • cholecalciferol (VITAMIN D3) 25 MCG (1000 UT) tablet Take 1,000 Units by mouth Daily.   Unknown at Unknown time   • docusate sodium (COLACE) 100 MG capsule Take 100 mg by mouth Daily.   Unknown at Unknown time   • donepezil (ARICEPT) 10 MG tablet Take 10 mg by mouth Every Night.   Unknown at Unknown time   • fluticasone (FLONASE) 50 MCG/ACT nasal spray 2 sprays by Each Nare route 2 (Two) Times a Day.   Unknown at Unknown time   • gabapentin (NEURONTIN) 100 MG  capsule Take 100 mg by mouth 2 (Two) Times a Day.   Unknown at Unknown time   • Glucagon, rDNA, (Glucagon Emergency) 1 MG kit Inject 1 mg into the appropriate muscle as directed by prescriber Daily As Needed (Low blood sugar).   Unknown at Unknown time   • HYDROcodone-acetaminophen (NORCO) 7.5-325 MG per tablet Take 1 tablet by mouth 2 (Two) Times a Day.   Unknown at Unknown time   • Insulin NPH Isophane & Regular (HumuLIN 70/30 KwikPen) (70-30) 100 UNIT/ML suspension pen-injector Inject 10 Units under the skin into the appropriate area as directed Daily.   Unknown at Unknown time   • magnesium hydroxide (MILK OF MAGNESIA) 400 MG/5ML suspension Take 15 mL by mouth Daily As Needed for Constipation.   Unknown at Unknown time   • melatonin 3 MG tablet Take 3 mg by mouth Daily.   Unknown at Unknown time   • Metoprolol Tartrate 37.5 MG tablet Take 37.5 mg by mouth 2 (Two) Times a Day. Hold for SBP <110 or HR < 60   Unknown at Unknown time   • midodrine (PROAMATINE) 10 MG tablet Take 10 mg by mouth 3 (Three) Times a Day Before Meals. Hold for /90   Unknown at Unknown time   • neomycin-bacitracin-polymyxin (NEOSPORIN) 5-400-5000 ointment Apply 1 application topically to the appropriate area as directed Every Night. Apply to abrasions on right inner thigh and intact blister on left inner thigh   Unknown at Unknown time   • Nitroglycerin 400 MCG pack Place 1 packet under the tongue Daily As Needed (Chest pain).   Unknown at Unknown time   • omeprazole (priLOSEC) 20 MG capsule Take 20 mg by mouth Daily.   Unknown at Unknown time   • ondansetron (ZOFRAN) 4 MG tablet Take 4 mg by mouth Every 8 (Eight) Hours As Needed for Nausea or Vomiting.   Unknown at Unknown time   • phenylephrine-cocoa Butter (Preparation H) 0.25-88.44 % suppository suppository Insert 1 suppository into the rectum Daily As Needed (Constipation).   Unknown at Unknown time   • primidone (MYSOLINE) 50 MG tablet Take 25 mg by mouth Daily.   Unknown at  "Unknown time   • sertraline (ZOLOFT) 50 MG tablet Take 50 mg by mouth Daily.   Unknown at Unknown time   • tiotropium (SPIRIVA) 18 MCG per inhalation capsule Place 1 capsule into inhaler and inhale Daily.   Unknown at Unknown time   • vitamin B-12 (CYANOCOBALAMIN) 1000 MCG tablet Take 1,000 mcg by mouth Daily.   Unknown at Unknown time       Allergies  Codeine    Scheduled Meds:atorvastatin, 40 mg, Oral, Daily  cholecalciferol, 1,000 Units, Oral, Daily  docusate sodium, 100 mg, Oral, Daily  donepezil, 10 mg, Oral, Nightly  gabapentin, 100 mg, Oral, BID  HYDROcodone-acetaminophen, 1 tablet, Oral, BID  insulin lispro, 0-9 Units, Subcutaneous, TID AC  insulin NPH-insulin regular, 10 Units, Subcutaneous, Daily With Breakfast  ipratropium-albuterol, 3 mL, Nebulization, 4x Daily - RT  metoprolol tartrate, 37.5 mg, Oral, BID  midodrine, 10 mg, Oral, TID AC  neomycin-bacitracin-polymyxin, 1 application, Topical, Nightly  pantoprazole, 40 mg, Oral, QAM AC  primidone, 25 mg, Oral, Daily  sertraline, 50 mg, Oral, Daily  sodium chloride, 10 mL, Intravenous, Q12H  vitamin B-12, 1,000 mcg, Oral, Daily      Continuous Infusions:   PRN Meds:.•  albuterol  •  bisacodyl  •  dextrose  •  dextrose  •  glucagon (human recombinant)  •  insulin lispro **AND** insulin lispro  •  melatonin  •  nitroglycerin  •  ondansetron  •  ondansetron  •  [COMPLETED] Insert peripheral IV **AND** sodium chloride  •  sodium chloride    Objective     VITAL SIGNS  Vitals:    03/13/22 2041 03/13/22 2100 03/14/22 0041 03/14/22 0511   BP: 122/75 110/56 106/61 110/68   BP Location: Right arm Right arm Right arm Right arm   Patient Position: Lying Sitting Sitting Lying   Pulse: 95 102 93 91   Resp: 16 17 10 12   Temp: 98.5 °F (36.9 °C)  98.1 °F (36.7 °C) 97.4 °F (36.3 °C)   TempSrc: Oral  Oral Oral   SpO2: 95% 96% 97% 96%   Weight:    120 kg (265 lb 6.9 oz)   Height:           Flowsheet Rows    Flowsheet Row First Filed Value   Admission Height 177.8 cm (70\") " Documented at 03/12/2022 1910   Admission Weight 113 kg (250 lb) Documented at 03/12/2022 1910            Intake/Output Summary (Last 24 hours) at 3/14/2022 0631  Last data filed at 3/14/2022 0041  Gross per 24 hour   Intake 1404 ml   Output --   Net 1404 ml        TELEMETRY: Sinus rhythm    Physical Exam:  The patient is alert, oriented and in no distress.  Vital signs as noted above.  Exogenous obesity.  Head and neck revealed no carotid bruits or jugular venous distention.  No thyromegaly or lymphadenopathy is present  Lungs clear.  No wheezing.  Breath sounds are normal bilaterally.  Heart normal first and second heart sounds.  No murmur. No precordial rub is present.  No gallop is present.  Abdomen soft and nontender.  No organomegaly is present.  Extremities with good peripheral pulses without any pedal edema.  Skin warm and dry.  Musculoskeletal system is grossly normal  CNS grossly normal      Results Review:   I reviewed the patient's new clinical results.  Lab Results (last 24 hours)     Procedure Component Value Units Date/Time    Basic Metabolic Panel [[id number]]  (Abnormal) Collected: 03/13/22 2312    Specimen: Blood Updated: 03/14/22 0122     Glucose 208 mg/dL      BUN 40 mg/dL      Creatinine 4.01 mg/dL      Sodium 131 mmol/L      Potassium 5.4 mmol/L      Chloride 95 mmol/L      CO2 23.0 mmol/L      Calcium 8.3 mg/dL      BUN/Creatinine Ratio 10.0     Anion Gap 13.0 mmol/L      eGFR 15.2 mL/min/1.73      Comment: National Kidney Foundation and American Society of Nephrology (ASN) Task Force recommended calculation based on the Chronic Kidney Disease Epidemiology Collaboration (CKD-EPI) equation refit without adjustment for race.       Narrative:      GFR Normal >60  Chronic Kidney Disease <60  Kidney Failure <15      CBC & Differential [[id number]]  (Abnormal) Collected: 03/13/22 2312    Specimen: Blood Updated: 03/14/22 0059    Narrative:      The following orders were created for panel order CBC &  Differential.  Procedure                               Abnormality         Status                     ---------                               -----------         ------                     CBC Auto Differential[[id number]]        Abnormal            Final result                 Please view results for these tests on the individual orders.    CBC Auto Differential [[id number]]  (Abnormal) Collected: 03/13/22 2312    Specimen: Blood Updated: 03/14/22 0059     WBC 4.40 10*3/mm3      RBC 3.39 10*6/mm3      Hemoglobin 10.0 g/dL      Hematocrit 32.1 %      MCV 94.8 fL      MCH 29.6 pg      MCHC 31.3 g/dL      RDW 17.2 %      RDW-SD 58.2 fl      MPV 8.3 fL      Platelets 190 10*3/mm3      Neutrophil % 80.0 %      Lymphocyte % 7.8 %      Monocyte % 10.9 %      Eosinophil % 0.0 %      Basophil % 1.3 %      Neutrophils, Absolute 3.60 10*3/mm3      Lymphocytes, Absolute 0.30 10*3/mm3      Monocytes, Absolute 0.50 10*3/mm3      Eosinophils, Absolute 0.00 10*3/mm3      Basophils, Absolute 0.10 10*3/mm3      nRBC 0.1 /100 WBC     POC Glucose Once [[id number]]  (Abnormal) Collected: 03/13/22 2045    Specimen: Blood Updated: 03/13/22 2046     Glucose 224 mg/dL      Comment: Serial Number: [id number]:  [id number]       POC Glucose Once [[id number]]  (Abnormal) Collected: 03/13/22 1625    Specimen: Blood Updated: 03/13/22 1626     Glucose 237 mg/dL      Comment: Serial Number: [id number]:  [id number]       POC Glucose Once [[id number]]  (Abnormal) Collected: 03/13/22 1115    Specimen: Blood Updated: 03/13/22 1115     Glucose 248 mg/dL      Comment: Serial Number: [id number]:  [id number]       POC Glucose Once [[id number]]  (Abnormal) Collected: 03/13/22 0730    Specimen: Blood Updated: 03/13/22 0731     Glucose 153 mg/dL      Comment: Serial Number: [id number]:  [id number]             Imaging Results (Last 24 Hours)     ** No results found for the last 24 hours. **      LAB RESULTS (LAST 7 DAYS)    CBC  Results from  last 7 days   Lab Units 03/13/22 2312 03/12/22 2023 03/09/22 0228 03/07/22 2326 03/07/22 1801 03/07/22  1319   WBC 10*3/mm3 4.40 7.20 4.60 5.40 4.30 4.90   RBC 10*6/mm3 3.39* 3.45* 3.67* 3.43* 3.70* 3.70*   HEMOGLOBIN g/dL 10.0* 10.5* 10.8* 10.4* 11.5* 11.1*   HEMATOCRIT % 32.1* 32.9* 35.0* 33.5* 35.5* 35.2*   MCV fL 94.8 95.5 95.2 97.5* 95.8 95.1   PLATELETS 10*3/mm3 190 177 227 236 253 262       BMP  Results from last 7 days   Lab Units 03/13/22 2312 03/13/22 0408 03/12/22 2102 03/09/22 0228 03/07/22 2326 03/07/22 1801 03/07/22  1426   SODIUM mmol/L 131* 132* 132* 134* 136 136 134*   POTASSIUM mmol/L 5.4* 5.2 4.7 4.4 4.1 4.1 3.8   CHLORIDE mmol/L 95* 95* 97* 95* 96* 95* 95*   CO2 mmol/L 23.0 22.0 24.0 28.0 32.0* 33.0* 31.0*   BUN mg/dL 40* 30* 24* 27* 20 18 17   CREATININE mg/dL 4.01* 3.64* 3.62* 3.51* 3.00* 2.83* 2.50*   GLUCOSE mg/dL 208* 171* 126* 111* 175* 149* 156*   PHOSPHORUS mg/dL  --   --  3.9  --   --   --   --        CMP   Results from last 7 days   Lab Units 03/13/22 2312 03/13/22 0408 03/12/22 2102 03/09/22 0228 03/07/22 2326 03/07/22 1801 03/07/22  1426   SODIUM mmol/L 131* 132* 132* 134* 136 136 134*   POTASSIUM mmol/L 5.4* 5.2 4.7 4.4 4.1 4.1 3.8   CHLORIDE mmol/L 95* 95* 97* 95* 96* 95* 95*   CO2 mmol/L 23.0 22.0 24.0 28.0 32.0* 33.0* 31.0*   BUN mg/dL 40* 30* 24* 27* 20 18 17   CREATININE mg/dL 4.01* 3.64* 3.62* 3.51* 3.00* 2.83* 2.50*   GLUCOSE mg/dL 208* 171* 126* 111* 175* 149* 156*   ALBUMIN g/dL  --   --  3.40*  --   --   --  3.10*   BILIRUBIN mg/dL  --   --  0.6  --   --   --  0.4   ALK PHOS U/L  --   --  55  --   --   --  64   AST (SGOT) U/L  --   --  12  --   --   --  16   ALT (SGPT) U/L  --   --  8  --   --   --  5         BNP        TROPONIN  Results from last 7 days   Lab Units 03/12/22  2102   TROPONIN T ng/mL 0.146*       CoAg        Creatinine Clearance  Estimated Creatinine Clearance: 21.9 mL/min (A) (by C-G formula based on SCr of 4.01 mg/dL (H)).    ABG         Radiology  XR Chest 1 View    Result Date: 3/12/2022  Impression: Overall similar appearance of the chest compared to the prior exam with bilateral pleural fluid collections, low lung volumes, and large cardiac silhouette. Hazy opacities in both lower lung zones could be due to atelectasis or pneumonia. Follow-up images recommended to ensure clearance. Electronically signed by:  Jacques Otto M.D.  3/12/2022 8:17 PM              EKG            I personally viewed and interpreted the patient's EKG/Telemetry data:    ECHOCARDIOGRAM:    Results for orders placed during the hospital encounter of 03/12/22    Adult Transthoracic Echo Complete w/ Color, Spectral and Contrast if necessary per protocol    Interpretation Summary  · Left ventricular ejection fraction appears to be 26 - 30%. Left ventricular systolic function is severely decreased.  · Estimated right ventricular systolic pressure from tricuspid regurgitation is moderately elevated (45-55 mmHg). Calculated right ventricular systolic pressure from tricuspid regurgitation is 50 mmHg.  · The right ventricular cavity is mild to moderately dilated.  · There is a moderate sized left pleural effusion.          STRESS MYOVIEW:    Cardiolite (Tc-99m Sestamibi) stress test    CARDIAC CATHETERIZATION:            OTHER:         Assessment/Plan     Principal Problem:    Fluid overload  Active Problems:    COPD with acute exacerbation (HCC)    Patient presented to the emergency department 3/12/2022 from [facility] with complaint of shortness of breath.  He was discharged from this facility on 3/10/2022 for hypotension following dialysis and acute on chronic systolic heart failure.  Patient stated that he is supposed to be receiving breathing treatments but was told by the nurse at [facility] that they are not allowed to give breathing treatments.  He denies any chest pain, pressure, tightness, palpitations.  No nausea, vomiting or diarrhea.  Upon my  evaluation, his breath sounds are quite coarse and audible.  He has occasional congested cough.  EKG shows atrial fibrillation with left bundle branch block at 106.  Blood pressure is quite stable 133/67.  He has no lower extremity edema.  He is currently on nasal cannula at 5L.  Patient reports that although he does make some urine it is very little.    [[[[[[[[[[[[[[[[[[[[[[[[[    Impression  ==============  -Acute on chronic fluid overload.     -status post CABG 2005. status post stent to LAD 2009    Status post stent to LAD 11/13/2015  Status post stent to mid LAD and SVG to marginal branch 09/16/2016.  Status post stent to mid LAD 5/24/2021  Status post stent to SVG to RCA 5/26/2021     Cardiac catheterization 5/24/2021 revealed  Left ventricle angiogram was not performed due to pre-existing renal dysfunction.  Left main coronary artery normal.  Left anterior descending artery has mid segment lengthy 90% disease within the previously placed stent.  Distal left into descending artery has diffuse disease.  Circumflex coronary artery is totally occluded.  (Chronic)  Right coronary artery is chronically occluded.  SVG to marginal branch (jump graft to OM1 and OM 2) is totally occluded (new finding compared to 2015.  SVG to PDA has distal radiolucency.  However no definite obstructive disease is present.       -status post myocardial infarction 2000 and 2002 .      -history of intermittent but mostly persistent atrial fibrillation     -Acute on chronic congestive heart failure.  Compensated at this time.     Recent echocardiogram showed left ventricle dysfunction with ejection fraction of 35%.     -History of right-sided weakness with left MCA territory stroke.-Improved     Transesophageal echocardiogram 11/30/2020.  Biatrial enlargement.  Smoking effect in the left atrium and left ventricle and left atrial appendage.  Mild mitral and aortic regurgitation.  Left ventricle enlargement with diffuse hypocontractility  with ejection fraction of 35 to 40%.     Echocardiogram 11/27/2020 revealed left atrial enlargement left ventricle dysfunction with ejection fraction of 40%.  Negative bubble study.      -diabetes hypertension and sleep apnea.  ESRD.     -status post colon surgery (partial colectomy) appendectomy cholecystectomy right 4th toe removal and carpal tunnel surgery      -continued smoking 1 pack per day -abstinence from smoking      -allergy to codeine.  ============   Plan  ================  Acute on chronic fluid overload.    Status post CABG  Patient is not having any angina pectoris or congestive heart failure     Atrial fibrillation-chronic  Rate is better controlled  Patient is on Coreg at 12.5 mg p.o. twice a day amiodarone and Cardizem.      ESRD  Patient is undergoing dialysis today.      Status post stent to LAD 5/24/2021.  Status post stent to SVG to RCA 5/26/2021.       Anticoagulation  Patient was on Eliquis 5 mg twice a day.  Observe for toxic effects.  Patient is not on Eliquis at this time.  We will discuss further and start Eliquis.    Echocardiogram 3/12/2022 revealed ejection fraction of 25 to 30%.    Consider biventricular ICD if left ventricular function does not improve or congestive heart failure gets worse.    Further plan will depend on patient's progress.   ]]]]]]]]]]]]]]]]]]]]]]          Jose G Rm MD  03/14/22  06:31 EDT

## 2022-03-14 NOTE — PLAN OF CARE
Goal Outcome Evaluation: Pt is aware of current diagnose and the plan of care and is in agreement with current plan of care

## 2022-03-14 NOTE — PLAN OF CARE
Goal Outcome Evaluation:  Plan of Care Reviewed With: patient     Assessed patient, patient is AOX4. Family stated that patient has been having slurred speech and is unable to recall information. Called physician and MRI was done. Patient is currently in bed, pleasant and cooperative. No nursing concerns at this time.

## 2022-03-14 NOTE — PROGRESS NOTES
"RENAL/KCC:     LOS: 0 days    Patient Care Team:  Rudolph Rooney MD as PCP - General (Family Medicine)  Samantha Zabala APRN as PCP - Family Medicine  Jose G Rm MD as Consulting Physician (Cardiology)    Chief Complaint:  SOA    Subjective     Interval History:   Seen on HD this AM.  Feeling well.    Objective     Vital Sign Min/Max for last 24 hours  Temp  Min: 97.4 °F (36.3 °C)  Max: 98.5 °F (36.9 °C)   BP  Min: 106/61  Max: 131/74   Pulse  Min: 90  Max: 106   Resp  Min: 10  Max: 22   SpO2  Min: 92 %  Max: 100 %   Flow (L/min)  Min: 3  Max: 3   Weight  Min: 120 kg (265 lb 6.9 oz)  Max: 120 kg (265 lb 6.9 oz)     Flowsheet Rows    Flowsheet Row First Filed Value   Admission Height 177.8 cm (70\") Documented at 03/12/2022 1910   Admission Weight 113 kg (250 lb) Documented at 03/12/2022 1910          No intake/output data recorded.  I/O last 3 completed shifts:  In: 1404 [P.O.:1404]  Out: -     Physical Exam:  GEN: Awake, NAD  ENT: PERRL, EOMI, MMM  NECK: Supple, no JVD  CHEST: CTAB, no W/R/C  CV: RRR, no M/G/R  ABD: Soft, NT, +BS  SKIN: Warm and Dry  NEURO: CN's intact      WBC WBC   Date Value Ref Range Status   03/13/2022 4.40 3.40 - 10.80 10*3/mm3 Final   03/12/2022 7.20 3.40 - 10.80 10*3/mm3 Final      HGB Hemoglobin   Date Value Ref Range Status   03/13/2022 10.0 (L) 13.0 - 17.7 g/dL Final   03/12/2022 10.5 (L) 13.0 - 17.7 g/dL Final      HCT Hematocrit   Date Value Ref Range Status   03/13/2022 32.1 (L) 37.5 - 51.0 % Final   03/12/2022 32.9 (L) 37.5 - 51.0 % Final      Platlets No results found for: LABPLAT   MCV MCV   Date Value Ref Range Status   03/13/2022 94.8 79.0 - 97.0 fL Final   03/12/2022 95.5 79.0 - 97.0 fL Final          Sodium Sodium   Date Value Ref Range Status   03/13/2022 131 (L) 136 - 145 mmol/L Final   03/13/2022 132 (L) 136 - 145 mmol/L Final   03/12/2022 132 (L) 136 - 145 mmol/L Final      Potassium Potassium   Date Value Ref Range Status   03/13/2022 5.4 (H) 3.5 - 5.2 mmol/L Final "   03/13/2022 5.2 3.5 - 5.2 mmol/L Final   03/12/2022 4.7 3.5 - 5.2 mmol/L Final      Chloride Chloride   Date Value Ref Range Status   03/13/2022 95 (L) 98 - 107 mmol/L Final   03/13/2022 95 (L) 98 - 107 mmol/L Final   03/12/2022 97 (L) 98 - 107 mmol/L Final      CO2 CO2   Date Value Ref Range Status   03/13/2022 23.0 22.0 - 29.0 mmol/L Final   03/13/2022 22.0 22.0 - 29.0 mmol/L Final   03/12/2022 24.0 22.0 - 29.0 mmol/L Final      BUN BUN   Date Value Ref Range Status   03/13/2022 40 (H) 8 - 23 mg/dL Final   03/13/2022 30 (H) 8 - 23 mg/dL Final   03/12/2022 24 (H) 8 - 23 mg/dL Final      Creatinine Creatinine   Date Value Ref Range Status   03/13/2022 4.01 (H) 0.76 - 1.27 mg/dL Final   03/13/2022 3.64 (H) 0.76 - 1.27 mg/dL Final   03/12/2022 3.62 (H) 0.76 - 1.27 mg/dL Final      Calcium Calcium   Date Value Ref Range Status   03/13/2022 8.3 (L) 8.6 - 10.5 mg/dL Final   03/13/2022 8.6 8.6 - 10.5 mg/dL Final   03/12/2022 8.7 8.6 - 10.5 mg/dL Final      PO4 No results found for: CAPO4   Albumin Albumin   Date Value Ref Range Status   03/12/2022 3.40 (L) 3.50 - 5.20 g/dL Final      Magnesium No results found for: MG   Uric Acid No results found for: URICACID        Results Review:     I reviewed the patient's new clinical results.    atorvastatin, 40 mg, Oral, Daily  cholecalciferol, 1,000 Units, Oral, Daily  docusate sodium, 100 mg, Oral, Daily  donepezil, 10 mg, Oral, Nightly  gabapentin, 100 mg, Oral, BID  HYDROcodone-acetaminophen, 1 tablet, Oral, BID  insulin lispro, 0-9 Units, Subcutaneous, TID AC  insulin NPH-insulin regular, 10 Units, Subcutaneous, Daily With Breakfast  ipratropium-albuterol, 3 mL, Nebulization, 4x Daily - RT  metoprolol tartrate, 37.5 mg, Oral, BID  midodrine, 10 mg, Oral, TID AC  neomycin-bacitracin-polymyxin, 1 application, Topical, Nightly  pantoprazole, 40 mg, Oral, QAM AC  primidone, 25 mg, Oral, Daily  sertraline, 50 mg, Oral, Daily  sodium chloride, 10 mL, Intravenous, Q12H  vitamin  B-12, 1,000 mcg, Oral, Daily           Medication Review: Reviewed    Assessment/Plan     1) ESRD  2) Chronic hypotension  3) COPD exacerbation  4) CHF  5) Anemia of CKD    Plan: Seen on HD.  BP stable.  SOA improved.  OK for D/C from a renal standpoint.  Need to make sure breathing treatments are ordered at rehab.  Will follow.        Yony Bhat MD   [facility]  03/14/22  10:03 EDT

## 2022-03-14 NOTE — DISCHARGE PLACEMENT REQUEST
"Benson Tobin (71 y.o. Male)             Date of Birth   1950    Social Security Number       Address   [address] [place] [address]    Home Phone   [phone]    MRN   [id number]       [facility]    Marital Status                               Admission Date   3/12/22    Admission Type   Emergency    Admitting Provider   Kay Car MD    Attending Provider   Kay Car MD    Department, Room/Bed   [address] PEDIATRICS, 205/1       Discharge Date       Discharge Disposition       Discharge Destination                               Attending Provider: Kay Car MD    Allergies: Codeine    Isolation: None   Infection: None   Code Status: CPR   Advance Care Planning Activity    Ht: 177.8 cm (70\")   Wt: 120 kg (265 lb 6.9 oz)    Admission Cmt: None   Principal Problem: Fluid overload [E87.70]                 Active Insurance as of 3/12/2022     Primary Coverage     Payor Plan Insurance Group Employer/Plan Group    HUMANA MEDICARE REPLACEMENT HUMANA MEDICARE REPLACEMENT [id number]     Payor Plan Address Payor Plan Phone Number Payor Plan Fax Number Effective Dates    [address] [address] [phone]  1/1/2018 - None Entered    [facility] [address]       Subscriber Name Subscriber Birth Date Member ID       BENSON TOBIN 1950 [id number]           Secondary Coverage     Payor Plan Insurance Group Employer/Plan Group    [place]      Payor Plan Address Payor Plan Phone Number Payor Plan Fax Number Effective Dates    [address]   5/1/2021 - None Entered    [place] [address]       Subscriber Name Subscriber Birth Date Member ID       BENSON TOBIN 1950 [id number]                 Emergency Contacts      (Rel.) Home Phone Work Phone Mobile Phone    SHAUNA TOBIN (Spouse) [phone] -- [phone]              "

## 2022-03-14 NOTE — NURSING NOTE
Patient has started to have increased symptoms of detoxing. He explains that he hears a couple boys but knows giovani are not there. He also explains he feels sick and that he feels as though bugs are crawling on him and his skin is like a cactus. Patient calls his mother on the phone and appears to calm some but triggers a higher CIWA

## 2022-03-15 NOTE — CASE MANAGEMENT/SOCIAL WORK
Discharge Planning Assessment   Brennan     Patient Name: Benson Mclean  MRN: [id number]  Today's Date: 3/15/2022    Admit Date: 3/12/2022     Discharge Needs Assessment     Row Name 03/15/22 1219       Living Environment    People in Home facility resident    Current Living Arrangements extended care facility    Primary Care Provided by --  facility staff    Provides Primary Care For no one, unable/limited ability to care for self    Family Caregiver if Needed spouse    Family Caregiver Names Spouse-Melanie    Quality of Family Relationships supportive    Able to Return to Prior Arrangements yes       Resource/Environmental Concerns    Resource/Environmental Concerns none    Transportation Concerns none       Transition Planning    Patient/Family Anticipates Transition to inpatient rehabilitation facility    Patient/Family Anticipated Services at Transition none    Transportation Anticipated health plan transportation       Discharge Needs Assessment    Readmission Within the Last 30 Days no previous admission in last 30 days    Equipment Currently Used at Home wheelchair    Concerns to be Addressed denies needs/concerns at this time    Equipment Needed After Discharge none    Patient's Choice of Community Agency(s) Patient came to us from [facility] and Plans to Return               Discharge Plan     Row Name 03/15/22 1223       Plan    Plan DC Plan: From [facility]. Okay to return per pt/family and facility liaison. Will need precert to return. PASRR per facility    Plan Comments CM spoke with the patient's wife on the phone.PCP verified. Patient's spouse reports that [facility]. She reported that the patient had been having Dialysis at the [facility] prior to going to [facility]. She reported that his dialysis is MWF. She reports that he is there for rehab and that the plan is for him to return to [facility]. YOHANNES then spoke with  the patient at bedside who could not confirm that he was at [facility] , he did not remember the name of the facility. CM asked the patient if he was just there for rehab, he reports that is what he is supposed to be there for but that he is still waiting for them to work with him. He reports that he is mostly bedbound and only up to the chair occasionally .CM asked the patient if the plan was to return and he said he guessed that was the plan. CM reported that was what his wife had said  and patient agreed to that. Patient reports that prior to going to a facility he was using a rolling walker.                 Demographic Summary     Row Name 03/15/22 1218       General Information    Admission Type inpatient    Arrived From emergency department    Referral Source admission list    Reason for Consult discharge planning    Preferred Language English       Contact Information    Permission Granted to Share Info With                Functional Status     Row Name 03/15/22 1219       Functional Status    Usual Activity Tolerance fair    Current Activity Tolerance poor       Functional Status, IADL    Medications completely dependent    Meal Preparation completely dependent    Housekeeping completely dependent    Laundry completely dependent    Shopping completely dependent       Mental Status Summary    Recent Changes in Mental Status/Cognitive Functioning no changes            Met with patient in room wearing PPE: mask/googles  Maintained distance greater than 6 feet and spent less than 15 minutes in the room.     Shanice Kemp RN     Office Phone: [phone]  Office Cell: [phone]

## 2022-03-15 NOTE — PROGRESS NOTES
[facility]     Progress Note    Patient Name: Benson Mclean  : 1950  MRN: [id number]  Primary Care Physician:  Rudolph Rooney MD  Date of admission: 3/12/2022    Subjective   Subjective     Chief Complaint: ESRD    History of Present Illness  Patient with ESRD on hd m/w/    Review of Systems    Objective   Objective     Vitals:   Temp:  [97.4 °F (36.3 °C)-98.1 °F (36.7 °C)] 97.4 °F (36.3 °C)  Heart Rate:  [] 88  Resp:  [9-18] 18  BP: ()/() 146/109  Flow (L/min):  [3] 3    Physical Exam   General Appearance:  In no acute distress.    HEENT: Normal HEENT exam.     Extremities: .  There is no deformity, effusion or dependent edema.    Neurological: Patient is alert    Result Review    Result Review:  I have personally reviewed the results from the time of this admission to 3/15/2022 08:46 EDT and agree with these findings:  []  Laboratory  []  Microbiology  []  Radiology  []  EKG/Telemetry   []  Cardiology/Vascular   []  Pathology  []  Old records  []  Other:    Assessment/Plan   Assessment / Plan     Brief Patient Summary:  Benson Mclean is a 71 y.o. male who has ESRD on hd //    Active Hospital Problems:  Active Hospital Problems    Diagnosis    • **Fluid overload    • Chronic respiratory failure with hypoxia, on home oxygen therapy (HCC)    • Obesity (BMI 30-39.9)    • End stage renal disease (HCC)    • Type 2 diabetes mellitus with hyperglycemia ([facility])    • COPD with acute exacerbation ([facility])    • Major depressive disorder, recurrent, mild ([facility])    • Unspecified dementia with behavioral disturbance ([facility])    • Coronary artery disease    • Vitamin D deficiency      Plan:   1) ESRD  2) Chronic hypotension  3) COPD exacerbation  4) CHF  5) Anemia of CKD    Patient seen and examined. Awake, alert. No complaints. S/p hd yesterday. Will continue hd /w/    Mary Harden MD

## 2022-03-15 NOTE — PLAN OF CARE
Goal Outcome Evaluation:      Patient did well overnight. He states he is unable to feed himself. Had patient attempt but stated his arm was too stiff. Patient was fed. He then proceeded to comb his own hair. Suspect patient just wants the company.

## 2022-03-15 NOTE — PROGRESS NOTES
Referring Provider: Kay Car MD    Reason for follow-up:  Congestive heart failure  Status post CABG  Status post stent     Patient Care Team:  Rudolph Rooney MD as PCP - General (Family Medicine)  Samantha Zabala APRN as PCP - Family Medicine  Jose G Rm MD as Consulting Physician (Cardiology)    Subjective .  Feeling okay     ROS    The patient has been without any chest discomfort ,shortness of breath, palpitations, dizziness or syncope.  Denies having any headache ,abdominal pain ,nausea, vomiting , diarrhea constipation, loss of weight or loss of appetite.  Denies having any excessive bruising ,hematuria or blood in the stool.    Review of all systems negative except as indicated    History  Past Medical History:   Diagnosis Date   • A-fib ([facility]) 8/3/2021   • Anemia    • Appetite loss    • Arthritis    • Brain bleed ([facility])    • Carpal tunnel syndrome on left    • CHF (congestive heart failure) ([facility])    • Chronic left-sided low back pain without sciatica 12/27/2017   • CKD (chronic kidney disease) stage 3, GFR 30-59 ml/min ([facility])    • Closed fracture of seventh thoracic vertebra ([facility]) 8/23/2020   • Cognitive communication deficit 12/1/2020   • COPD (chronic obstructive pulmonary disease) ([facility])    • Coronary artery disease    • COVID-19 2/19/2021   • Cytokine release syndrome, grade 1 5/24/2021   • Depression    • Diabetic neuropathy ([facility])    • Dialysis patient ([facility])     mon wed fri   • DM2 (diabetes mellitus, type 2) ([facility])    • GERD (gastroesophageal reflux disease)    • Hyperlipidemia    • Hypertension    • Hypogonadism in male    • Neuropathy    • Nonsustained ventricular tachycardia ([facility]) 7/23/2021   • Obesity    • Paroxysmal atrial fibrillation ([facility]) 12/1/2020   • Sleep apnea    • Stroke ([facility])    • Unsteady gait        Past Surgical History:   Procedure Laterality Date   • ANGIOPLASTY      X2   • APPENDECTOMY     • ARTERIOVENOUS FISTULA/SHUNT SURGERY Left 1/20/2022    Procedure: LEFT BRACHIAL  CEPHALIC ARTERIAL VENOUS FISTULA CREATION;  Surgeon: Yony Davila MD;  Location: [facility] MAIN OR;  Service: Vascular;  Laterality: Left;   • CARDIAC CATHETERIZATION  11/13/2015   • CARDIAC CATHETERIZATION N/A 5/24/2021    Procedure: Left Heart Cath and coronary angiogram;  Surgeon: Jose G Rm MD;  Location:  [facility] CATH INVASIVE LOCATION;  Service: Cardiovascular;  Laterality: N/A;   • CARDIAC CATHETERIZATION  5/24/2021    Procedure: Saphenous Vein Graft;  Surgeon: Jose G Rm MD;  Location:  [facility] CATH INVASIVE LOCATION;  Service: Cardiovascular;;   • CARDIAC CATHETERIZATION N/A 5/24/2021    Procedure: Percutaneous Coronary Intervention;  Surgeon: Bernabe Zambrano MD;  Location: [facility] CATH INVASIVE LOCATION;  Service: Cardiology;  Laterality: N/A;   • CARDIAC CATHETERIZATION N/A 5/24/2021    Procedure: Stent NIELS coronary;  Surgeon: Bernabe Zambrano MD;  Location: [facility] CATH INVASIVE LOCATION;  Service: Cardiology;  Laterality: N/A;   • CARDIAC CATHETERIZATION N/A 5/26/2021    Procedure: Percutaneous Coronary Intervention;  Surgeon: Bernabe Zambrano MD;  Location:  [facility] CATH INVASIVE LOCATION;  Service: Cardiovascular;  Laterality: N/A;   • CARDIAC CATHETERIZATION N/A 5/26/2021    Procedure: Stent NIELS bypass graft;  Surgeon: Bernabe Zambrano MD;  Location:  [facility] CATH INVASIVE LOCATION;  Service: Cardiovascular;  Laterality: N/A;   • CARDIAC ELECTROPHYSIOLOGY PROCEDURE N/A 5/24/2021    Procedure: Temporary Pacemaker;  Surgeon: Bernabe Zambrano MD;  Location: [facility] CATH INVASIVE LOCATION;  Service: Cardiology;  Laterality: N/A;   • CARDIAC ELECTROPHYSIOLOGY PROCEDURE N/A 5/26/2021    Procedure: Temporary Pacemaker;  Surgeon: Bernabe Zambrano MD;  Location: [facility] CATH INVASIVE LOCATION;  Service: Cardiovascular;  Laterality: N/A;   • CARPAL TUNNEL RELEASE Left 04/29/2018    carpal tunnel- lt hand// other hand surgeries    • CATARACT EXTRACTION, BILATERAL  2002    Dr. Lux Acosta   • CHOLECYSTECTOMY     •  COLON RESECTION  2005   • CORONARY ANGIOPLASTY     • CORONARY ANGIOPLASTY WITH STENT PLACEMENT  11/13/2015    PTCA stent to proximal in stent and mid to distal lad   • CORONARY ANGIOPLASTY WITH STENT PLACEMENT  09/16/2016    PTCA stent to mid lad and stent to vein graft to marginal   • CORONARY ARTERY BYPASS GRAFT  2005    @ [facility]   • CYST REMOVAL      cyst removed from scrotum   • FOOT SURGERY Right 07/17/2018   • FOOT SURGERY Left 02/04/2019   • PORTACATH PLACEMENT     • TOE AMPUTATION Right     right toe removed D/T infected cut that went to the bone       Family History   Problem Relation Age of Onset   • Heart disease Mother    • Heart disease Father    • Diabetes Sister    • Heart disease Sister    • Diabetes Brother    • Mental illness Brother        Social History     Tobacco Use   • Smoking status: Former Smoker     Packs/day: 1.00     Years: 60.00     Pack years: 60.00     Types: Cigarettes   • Smokeless tobacco: Never Used   • Tobacco comment: Patient quit smoking 11/2020   Vaping Use   • Vaping Use: Never used   Substance Use Topics   • Alcohol use: No   • Drug use: Yes     Frequency: 1.0 times per week     Types: Marijuana     Comment: socially        Medications Prior to Admission   Medication Sig Dispense Refill Last Dose   • atorvastatin (LIPITOR) 40 MG tablet Take 40 mg by mouth Daily.   Unknown at Unknown time   • bisacodyl (DULCOLAX) 10 MG suppository Insert 10 mg into the rectum Daily As Needed for Constipation.   Unknown at Unknown time   • cholecalciferol (VITAMIN D3) 25 MCG (1000 UT) tablet Take 1,000 Units by mouth Daily.   Unknown at Unknown time   • docusate sodium (COLACE) 100 MG capsule Take 100 mg by mouth Daily.   Unknown at Unknown time   • donepezil (ARICEPT) 10 MG tablet Take 10 mg by mouth Every Night.   Unknown at Unknown time   • fluticasone (FLONASE) 50 MCG/ACT nasal spray 2 sprays by Each Nare route 2 (Two) Times a Day.   Unknown at Unknown time   • gabapentin (NEURONTIN) 100 MG  capsule Take 100 mg by mouth 2 (Two) Times a Day.   Unknown at Unknown time   • Glucagon, rDNA, (Glucagon Emergency) 1 MG kit Inject 1 mg into the appropriate muscle as directed by prescriber Daily As Needed (Low blood sugar).   Unknown at Unknown time   • HYDROcodone-acetaminophen (NORCO) 7.5-325 MG per tablet Take 1 tablet by mouth 2 (Two) Times a Day.   Unknown at Unknown time   • Insulin NPH Isophane & Regular (HumuLIN 70/30 KwikPen) (70-30) 100 UNIT/ML suspension pen-injector Inject 10 Units under the skin into the appropriate area as directed Daily.   Unknown at Unknown time   • magnesium hydroxide (MILK OF MAGNESIA) 400 MG/5ML suspension Take 15 mL by mouth Daily As Needed for Constipation.   Unknown at Unknown time   • melatonin 3 MG tablet Take 3 mg by mouth Daily.   Unknown at Unknown time   • Metoprolol Tartrate 37.5 MG tablet Take 37.5 mg by mouth 2 (Two) Times a Day. Hold for SBP <110 or HR < 60   Unknown at Unknown time   • midodrine (PROAMATINE) 10 MG tablet Take 10 mg by mouth 3 (Three) Times a Day Before Meals. Hold for /90   Unknown at Unknown time   • neomycin-bacitracin-polymyxin (NEOSPORIN) 5-400-5000 ointment Apply 1 application topically to the appropriate area as directed Every Night. Apply to abrasions on right inner thigh and intact blister on left inner thigh   Unknown at Unknown time   • Nitroglycerin 400 MCG pack Place 1 packet under the tongue Daily As Needed (Chest pain).   Unknown at Unknown time   • omeprazole (priLOSEC) 20 MG capsule Take 20 mg by mouth Daily.   Unknown at Unknown time   • ondansetron (ZOFRAN) 4 MG tablet Take 4 mg by mouth Every 8 (Eight) Hours As Needed for Nausea or Vomiting.   Unknown at Unknown time   • phenylephrine-cocoa Butter (Preparation H) 0.25-88.44 % suppository suppository Insert 1 suppository into the rectum Daily As Needed (Constipation).   Unknown at Unknown time   • primidone (MYSOLINE) 50 MG tablet Take 25 mg by mouth Daily.   Unknown at  Unknown time   • sertraline (ZOLOFT) 50 MG tablet Take 50 mg by mouth Daily.   Unknown at Unknown time   • tiotropium (SPIRIVA) 18 MCG per inhalation capsule Place 1 capsule into inhaler and inhale Daily.   Unknown at Unknown time   • vitamin B-12 (CYANOCOBALAMIN) 1000 MCG tablet Take 1,000 mcg by mouth Daily.   Unknown at Unknown time       Allergies  Codeine    Scheduled Meds:atorvastatin, 40 mg, Oral, Daily  cholecalciferol, 1,000 Units, Oral, Daily  docusate sodium, 100 mg, Oral, Daily  donepezil, 10 mg, Oral, Nightly  gabapentin, 100 mg, Oral, BID  guaiFENesin, 1,200 mg, Oral, Q12H  HYDROcodone-acetaminophen, 1 tablet, Oral, BID  insulin lispro, 0-9 Units, Subcutaneous, TID AC  insulin NPH-insulin regular, 10 Units, Subcutaneous, Daily With Breakfast  ipratropium-albuterol, 3 mL, Nebulization, TID - RT  metoprolol tartrate, 37.5 mg, Oral, BID  midodrine, 10 mg, Oral, TID AC  neomycin-bacitracin-polymyxin, 1 application, Topical, Nightly  pantoprazole, 40 mg, Oral, QAM AC  primidone, 25 mg, Oral, Daily  sertraline, 50 mg, Oral, Daily  sodium chloride, 10 mL, Intravenous, Q12H  vitamin B-12, 1,000 mcg, Oral, Daily      Continuous Infusions:   PRN Meds:.bisacodyl  •  dextrose  •  dextrose  •  glucagon (human recombinant)  •  heparin (porcine)  •  insulin lispro **AND** insulin lispro  •  ipratropium-albuterol  •  melatonin  •  nitroglycerin  •  ondansetron  •  ondansetron  •  [COMPLETED] Insert peripheral IV **AND** sodium chloride  •  sodium chloride    Objective     VITAL SIGNS  Vitals:    03/14/22 1900 03/15/22 0100 03/15/22 0449 03/15/22 0600   BP: 103/59 91/64 (!) 86/52    BP Location: Right arm Right arm Right arm    Patient Position: Sitting Sitting Sitting    Pulse: 109 77 94    Resp: 9 15 18    Temp: 97.7 °F (36.5 °C) 97.6 °F (36.4 °C) 97.4 °F (36.3 °C)    TempSrc: Oral Oral Oral    SpO2: 97% 97% 98%    Weight:    115 kg (253 lb 8.5 oz)   Height:           Flowsheet Rows    Flowsheet Row First Filed Value  "  Admission Height 177.8 cm (70\") Documented at 03/12/2022 1910   Admission Weight 113 kg (250 lb) Documented at 03/12/2022 1910            Intake/Output Summary (Last 24 hours) at 3/15/2022 0630  Last data filed at 3/14/2022 1425  Gross per 24 hour   Intake 240 ml   Output 2000 ml   Net -1760 ml        TELEMETRY: Sinus rhythm    Physical Exam:  The patient is alert, oriented and in no distress.  Vital signs as noted above.  Exogenous obesity.  Head and neck revealed no carotid bruits or jugular venous distention.  No thyromegaly or lymphadenopathy is present  Lungs clear.  No wheezing.  Breath sounds are normal bilaterally.  Heart normal first and second heart sounds.  No murmur. No precordial rub is present.  No gallop is present.  Abdomen soft and nontender.  No organomegaly is present.  Extremities with good peripheral pulses without any pedal edema.  Skin warm and dry.  Musculoskeletal system is grossly normal  CNS grossly normal      Results Review:   I reviewed the patient's new clinical results.  Lab Results (last 24 hours)     Procedure Component Value Units Date/Time    POC Glucose Once [[id number]]  (Abnormal) Collected: 03/14/22 1634    Specimen: Blood Updated: 03/14/22 1636     Glucose 163 mg/dL      Comment: Serial Number: [id number]:  [id number]       POC Glucose Once [[id number]]  (Abnormal) Collected: 03/14/22 1146    Specimen: Blood Updated: 03/14/22 1147     Glucose 140 mg/dL      Comment: Serial Number: [id number]:  [id number]       Hemoglobin A1c [[id number]]  (Abnormal) Collected: 03/12/22 2102    Specimen: Blood from Arm, Right Updated: 03/14/22 0956     Hemoglobin A1C 6.3 %     Narrative:      Hemoglobin A1C Reference Range:    <5.7 %        Normal  5.7-6.4 %     Increased risk for diabetes  > 6.4 %        Diabetes       These guidelines have been recommended by the American Diabetic Association for Hgb A1c.      The following 2010 guidelines have been recommended by the American " Diabetes Association for Hemoglobin A1c.    HBA1c 5.7-6.4% Increased risk for future diabetes (pre-diabetes)  HBA1c     >6.4% Diabetes            Imaging Results (Last 24 Hours)     Procedure Component Value Units Date/Time    MRI Brain Without Contrast [[id number]] Collected: 03/14/22 1554     Updated: 03/14/22 1607    Narrative:      MRI BRAIN WO CONTRAST-     Date of Exam: 3/14/2022 3:10 PM     Indication: Slurred speech and vision loss; I50.9-Heart failure,  unspecified; N28.9-Disorder of kidney and ureter, unspecified;  N18.9-Chronic kidney disease, unspecified; F09-Cswgkvw effusion, not  elsewhere classified.       Comparison: MRI of the brain 11/27/2020 Head CT 11/29/2020     Technique: Multiplanar multisequence images of the brain were performed  without contrast according to routine brain MRI protocol.     FINDINGS:     There is a solitary tiny focus of increased signal on diffusion-weighted  images, measuring 5 mm image 61. This is along the periphery of  encephalomalacia from the patient's known remote infarct in this  location. No restricted diffusion demonstrated elsewhere.     Moderate-large amount of white matter signal change strain the  ventricles and small areas elsewhere throughout the brain most likely  due to chronic microvascular ischemia. Signal change surrounding the  known left occipital lobe infarct. Remote lacunar infarct right  cerebellum again noted. Visualized intracranial flow voids appear  preserved.     No definitive intracranial hemorrhage although evaluation somewhat  limited due to motion. There is along the left occipital infarct favored  to relate to chronic changes and possibly laminar necrosis. Rounded  focus of susceptibility artifact suggested along the left frontal lobe  image 55 measuring 1.7 cm similar to smaller areas along the inner table  of the calvarium elsewhere. No hydrocephalus. No midline shift. No brain  herniation. No extra-axial fluid collection. Orbital  structures appear  within normal limits.       Impression:         Tiny 5 mm focus of signal change along the periphery of the known remote  left occipital infarct favored to be artifactual although a tiny  solitary focus of acute ischemia cannot be entirely excluded. No  evidence of acute ischemic infarct elsewhere.     Motion limited examination. No definitive areas of intracranial  hemorrhage although recommend further evaluation with a noncontrast CT  of the head.     Large amount of diffuse signal change of the brain consistent with  chronic vascular ischemia.        Electronically Signed By-Zaire Irving On:3/14/2022 4:05 PM  This report was finalized on [id number] by  Zaire Irving, .      LAB RESULTS (LAST 7 DAYS)    CBC  Results from last 7 days   Lab Units 03/13/22 2312 03/12/22 2023 03/09/22 0228   WBC 10*3/mm3 4.40 7.20 4.60   RBC 10*6/mm3 3.39* 3.45* 3.67*   HEMOGLOBIN g/dL 10.0* 10.5* 10.8*   HEMATOCRIT % 32.1* 32.9* 35.0*   MCV fL 94.8 95.5 95.2   PLATELETS 10*3/mm3 190 177 227       BMP  Results from last 7 days   Lab Units 03/13/22 2312 03/13/22 0408 03/12/22 2102 03/09/22 0228   SODIUM mmol/L 131* 132* 132* 134*   POTASSIUM mmol/L 5.4* 5.2 4.7 4.4   CHLORIDE mmol/L 95* 95* 97* 95*   CO2 mmol/L 23.0 22.0 24.0 28.0   BUN mg/dL 40* 30* 24* 27*   CREATININE mg/dL 4.01* 3.64* 3.62* 3.51*   GLUCOSE mg/dL 208* 171* 126* 111*   PHOSPHORUS mg/dL  --   --  3.9  --        CMP   Results from last 7 days   Lab Units 03/13/22 2312 03/13/22 0408 03/12/22 2102 03/09/22 0228   SODIUM mmol/L 131* 132* 132* 134*   POTASSIUM mmol/L 5.4* 5.2 4.7 4.4   CHLORIDE mmol/L 95* 95* 97* 95*   CO2 mmol/L 23.0 22.0 24.0 28.0   BUN mg/dL 40* 30* 24* 27*   CREATININE mg/dL 4.01* 3.64* 3.62* 3.51*   GLUCOSE mg/dL 208* 171* 126* 111*   ALBUMIN g/dL  --   --  3.40*  --    BILIRUBIN mg/dL  --   --  0.6  --    ALK PHOS U/L  --   --  55  --    AST (SGOT) U/L  --   --  12  --    ALT (SGPT) U/L  --   --  8  --          BNP         TROPONIN  Results from last 7 days   Lab Units 03/12/22 2102   TROPONIN T ng/mL 0.146*       CoAg        Creatinine Clearance  Estimated Creatinine Clearance: 21.5 mL/min (A) (by C-G formula based on SCr of 4.01 mg/dL (H)).    ABG        Radiology  MRI Brain Without Contrast    Result Date: 3/14/2022   Tiny 5 mm focus of signal change along the periphery of the known remote left occipital infarct favored to be artifactual although a tiny solitary focus of acute ischemia cannot be entirely excluded. No evidence of acute ischemic infarct elsewhere.  Motion limited examination. No definitive areas of intracranial hemorrhage although recommend further evaluation with a noncontrast CT of the head.  Large amount of diffuse signal change of the brain consistent with chronic vascular ischemia.   Electronically Signed By-Zaire Irving On:3/14/2022 4:05 PM This report was finalized on [id number] by  Zaire Irving, .              EKG            I personally viewed and interpreted the patient's EKG/Telemetry data:    ECHOCARDIOGRAM:    Results for orders placed during the hospital encounter of 03/12/22    Adult Transthoracic Echo Complete w/ Color, Spectral and Contrast if necessary per protocol    Interpretation Summary  · Left ventricular ejection fraction appears to be 26 - 30%. Left ventricular systolic function is severely decreased.  · Estimated right ventricular systolic pressure from tricuspid regurgitation is moderately elevated (45-55 mmHg). Calculated right ventricular systolic pressure from tricuspid regurgitation is 50 mmHg.  · The right ventricular cavity is mild to moderately dilated.  · There is a moderate sized left pleural effusion.          STRESS MYOVIEW:    Cardiolite (Tc-99m Sestamibi) stress test    CARDIAC CATHETERIZATION:            OTHER:         Assessment/Plan     Principal Problem:    Fluid overload  Active Problems:    Major depressive disorder, recurrent, mild (HCC)    Unspecified dementia  with behavioral disturbance (HCC)    Coronary artery disease    Vitamin D deficiency    COPD with acute exacerbation (HCC)    Type 2 diabetes mellitus with hyperglycemia (HCC)    End stage renal disease (HCC)    Chronic respiratory failure with hypoxia, on home oxygen therapy (HCC)    Obesity (BMI 30-39.9)    [facility].  He was discharged from this facility on 3/10/2022 for hypotension following dialysis and acute on chronic systolic heart failure.  Patient stated that he is supposed to be receiving breathing treatments but was told by the nurse at [facility] that they are not allowed to give breathing treatments.  He denies any chest pain, pressure, tightness, palpitations.  No nausea, vomiting or diarrhea.  Upon my evaluation, his breath sounds are quite coarse and audible.  He has occasional congested cough.  EKG shows atrial fibrillation with left bundle branch block at 106.  Blood pressure is quite stable 133/67.  He has no lower extremity edema.  He is currently on nasal cannula at 5L.  Patient reports that although he does make some urine it is very little.    [[[[[[[[[[[[[[[[[[[[[[[[[    Impression  ==============  -Acute on chronic fluid overload.     -status post CABG 2005. status post stent to LAD 2009    Status post stent to LAD 11/13/2015  Status post stent to mid LAD and SVG to marginal branch 09/16/2016.  Status post stent to mid LAD 5/24/2021  Status post stent to SVG to RCA 5/26/2021     Cardiac catheterization 5/24/2021 revealed  Left ventricle angiogram was not performed due to pre-existing renal dysfunction.  Left main coronary artery normal.  Left anterior descending artery has mid segment lengthy 90% disease within the previously placed stent.  Distal left into descending artery has diffuse disease.  Circumflex coronary artery is totally occluded.  (Chronic)  Right coronary  artery is chronically occluded.  SVG to marginal branch (jump graft to OM1 and OM 2) is totally occluded (new finding compared to 2015.  SVG to PDA has distal radiolucency.  However no definite obstructive disease is present.       -status post myocardial infarction 2000 and 2002 .      -history of intermittent but mostly persistent atrial fibrillation     -Acute on chronic congestive heart failure.  Compensated at this time.     Recent echocardiogram showed left ventricle dysfunction with ejection fraction of 35%.     -History of right-sided weakness with left MCA territory stroke.-Improved     Transesophageal echocardiogram 11/30/2020.  Biatrial enlargement.  Smoking effect in the left atrium and left ventricle and left atrial appendage.  Mild mitral and aortic regurgitation.  Left ventricle enlargement with diffuse hypocontractility with ejection fraction of 35 to 40%.     Echocardiogram 11/27/2020 revealed left atrial enlargement left ventricle dysfunction with ejection fraction of 40%.  Negative bubble study.      -diabetes hypertension and sleep apnea.  ESRD.     -status post colon surgery (partial colectomy) appendectomy cholecystectomy right 4th toe removal and carpal tunnel surgery      -continued smoking 1 pack per day -abstinence from smoking      -allergy to codeine.  ============   Plan  ================  Acute on chronic fluid overload.  Doing better    Status post CABG  Patient is not having any angina pectoris or congestive heart failure     Atrial fibrillation-chronic  Rate is better controlled  Patient is on Coreg at 12.5 mg p.o. twice a day amiodarone and Cardizem.      ESRD  Patient is undergoing dialysis today.      Status post stent to LAD 5/24/2021.  Status post stent to SVG to RCA 5/26/2021.       Anticoagulation  Patient was on Eliquis 5 mg twice a day.  Observe for toxic effects.  Patient is not on Eliquis at this time.  We will restart Eliquis due to chronic atrial  fibrillation.    Echocardiogram 3/12/2022 revealed ejection fraction of 25 to 30%.    Consider biventricular ICD if left ventricular function does not improve or congestive heart failure gets worse.    Further plan will depend on patient's progress.   ]]]]]]]]]]]]]]]]]]]]]]          Jose G Rm MD  03/15/22  06:30 EDT

## 2022-03-15 NOTE — PLAN OF CARE
Goal Outcome Evaluation:  Plan of Care Reviewed With: patient        Progress: no change   Alert and oriented x 2-3. Able to verbalize needs and wants. Takes medication whole and tolerates well. Abnormal heart rhythm noted, STAT EKG ordered. Cardiology and hospitalist made aware, NNO noted. No c/o pain/discomfort/SOB noted. Continues to require O2 therapy at 3 LPM via NC. Incontinent of bowel and bladder. Total for bed mobility. No s/s of hyper/hypoglycemia noted. No s/s of hypotension noted. Spouse called with concern of right hand shakiness, no shakiness observed in either of patient's upper extremities. Currently in bed, eyes closed. Rise and fall of chest observed. Call bell in reach

## 2022-03-16 NOTE — PLAN OF CARE
Goal Outcome Evaluation:  Plan of Care Reviewed With: patient        Progress: no change  Outcome Evaluation: Pt. is 72 y/o male admit from [facility] rehab facility for complaint of trouble breathing.  He was discharged from [facility] 2 days ago after staying 3 days treating him for hypotension following dialysis beside acute on chronic systolic CHF. Pt. states that he requires max A for toileting, typically wears breifs but has been unable to [place] [facility] Pt. reuqires mod A this date for all bed mobility and min A ot maintain static sitting secondary to posterior lean. Pt. provided setup assist for feeding ADL and complete assist for all LB ADLs. Recommend IP rehab at d/c to address aforementioned deficits. Will follow up w/ pt. 1-3x per week at [facility].

## 2022-03-16 NOTE — PROGRESS NOTES
"RENAL/KCC:     LOS: 1 day    Patient Care Team:  Rudolph Rooney MD as PCP - General (Family Medicine)  Samantha Zabala APRN as PCP - Family Medicine  Jose G Rm MD as Consulting Physician (Cardiology)    Chief Complaint:  SOA    Subjective     Interval History:   Seen on HD this AM.  Feeling well.    Objective     Vital Sign Min/Max for last 24 hours  Temp  Min: 97.4 °F (36.3 °C)  Max: 97.6 °F (36.4 °C)   BP  Min: 91/53  Max: 100/67   Pulse  Min: 82  Max: 102   Resp  Min: 10  Max: 18   SpO2  Min: 96 %  Max: 100 %   Flow (L/min)  Min: 3  Max: 3   Weight  Min: 116 kg (254 lb 13.6 oz)  Max: 116 kg (254 lb 13.6 oz)     Flowsheet Rows    Flowsheet Row First Filed Value   Admission Height 177.8 cm (70\") Documented at 03/12/2022 1910   Admission Weight 113 kg (250 lb) Documented at 03/12/2022 1910          No intake/output data recorded.  I/O last 3 completed shifts:  In: 720 [P.O.:720]  Out: -     Physical Exam:  GEN: Awake, NAD  ENT: PERRL, EOMI, MMM  NECK: Supple, no JVD  CHEST: CTAB, no W/R/C  CV: RRR, no M/G/R  ABD: Soft, NT, +BS  SKIN: Warm and Dry  NEURO: CN's intact      WBC WBC   Date Value Ref Range Status   03/16/2022 6.60 3.40 - 10.80 10*3/mm3 Final   03/13/2022 4.40 3.40 - 10.80 10*3/mm3 Final      HGB Hemoglobin   Date Value Ref Range Status   03/16/2022 10.4 (L) 13.0 - 17.7 g/dL Final   03/13/2022 10.0 (L) 13.0 - 17.7 g/dL Final      HCT Hematocrit   Date Value Ref Range Status   03/16/2022 33.0 (L) 37.5 - 51.0 % Final   03/13/2022 32.1 (L) 37.5 - 51.0 % Final      Platlets No results found for: LABPLAT   MCV MCV   Date Value Ref Range Status   03/16/2022 95.1 79.0 - 97.0 fL Final   03/13/2022 94.8 79.0 - 97.0 fL Final          Sodium Sodium   Date Value Ref Range Status   03/16/2022 127 (L) 136 - 145 mmol/L Final   03/13/2022 131 (L) 136 - 145 mmol/L Final      Potassium Potassium   Date Value Ref Range Status   03/16/2022 4.6 3.5 - 5.2 mmol/L Final   03/13/2022 5.4 (H) 3.5 - 5.2 mmol/L Final    "   Chloride Chloride   Date Value Ref Range Status   03/16/2022 93 (L) 98 - 107 mmol/L Final   03/13/2022 95 (L) 98 - 107 mmol/L Final      CO2 CO2   Date Value Ref Range Status   03/16/2022 22.0 22.0 - 29.0 mmol/L Final   03/13/2022 23.0 22.0 - 29.0 mmol/L Final      BUN BUN   Date Value Ref Range Status   03/16/2022 36 (H) 8 - 23 mg/dL Final   03/13/2022 40 (H) 8 - 23 mg/dL Final      Creatinine Creatinine   Date Value Ref Range Status   03/16/2022 3.73 (H) 0.76 - 1.27 mg/dL Final   03/13/2022 4.01 (H) 0.76 - 1.27 mg/dL Final      Calcium Calcium   Date Value Ref Range Status   03/16/2022 8.5 (L) 8.6 - 10.5 mg/dL Final   03/13/2022 8.3 (L) 8.6 - 10.5 mg/dL Final      PO4 No results found for: CAPO4   Albumin No results found for: ALBUMIN   Magnesium No results found for: MG   Uric Acid No results found for: URICACID        Results Review:     I reviewed the patient's new clinical results.    apixaban, 2.5 mg, Oral, Q12H  atorvastatin, 40 mg, Oral, Daily  cholecalciferol, 1,000 Units, Oral, Daily  docusate sodium, 100 mg, Oral, Daily  donepezil, 10 mg, Oral, Nightly  gabapentin, 100 mg, Oral, BID  guaiFENesin, 1,200 mg, Oral, Q12H  HYDROcodone-acetaminophen, 1 tablet, Oral, BID  insulin lispro, 0-9 Units, Subcutaneous, TID AC  insulin NPH-insulin regular, 10 Units, Subcutaneous, Daily With Breakfast  ipratropium-albuterol, 3 mL, Nebulization, TID - RT  metoprolol tartrate, 37.5 mg, Oral, BID  midodrine, 10 mg, Oral, TID AC  neomycin-bacitracin-polymyxin, 1 application, Topical, Nightly  pantoprazole, 40 mg, Oral, QAM AC  primidone, 25 mg, Oral, Daily  sertraline, 50 mg, Oral, Daily  sodium chloride, 10 mL, Intravenous, Q12H  vitamin B-12, 1,000 mcg, Oral, Daily           Medication Review: Reviewed    Assessment/Plan     1) ESRD  2) Chronic hypotension  3) COPD exacerbation  4) CHF  5) Anemia of CKD    Plan: Seen on HD.  BP stable.  SOA improved.  OK for D/C from a renal standpoint.  Will follow.        Yony  Jose Bhat MD   [facility]  03/16/22  09:18 EDT

## 2022-03-16 NOTE — PROGRESS NOTES
Referring Provider: Kay Car MD    Reason for follow-up:  Congestive heart failure  Status post CABG  Status post stent     Patient Care Team:  Rudolph Rooney MD as PCP - General (Family Medicine)  Samantha Zabala APRN as PCP - Family Medicine  Jose G Rm MD as Consulting Physician (Cardiology)    Subjective .  Feeling okay     ROS    The patient has been without any chest discomfort ,shortness of breath, palpitations, dizziness or syncope.  Denies having any headache ,abdominal pain ,nausea, vomiting , diarrhea constipation, loss of weight or loss of appetite.  Denies having any excessive bruising ,hematuria or blood in the stool.    Review of all systems negative except as indicated    History  Past Medical History:   Diagnosis Date   • A-fib (Formerly [facility]) 8/3/2021   • Anemia    • Appetite loss    • Arthritis    • Brain bleed (Formerly [facility])    • Carpal tunnel syndrome on left    • CHF (congestive heart failure) (Formerly [facility])    • Chronic left-sided low back pain without sciatica 12/27/2017   • CKD (chronic kidney disease) stage 3, GFR 30-59 ml/min (Formerly [facility])    • Closed fracture of seventh thoracic vertebra (Formerly [facility]) 8/23/2020   • Cognitive communication deficit 12/1/2020   • COPD (chronic obstructive pulmonary disease) (Formerly [facility])    • Coronary artery disease    • COVID-19 2/19/2021   • Cytokine release syndrome, grade 1 5/24/2021   • Depression    • Diabetic neuropathy (Formerly [facility])    • Dialysis patient (Formerly [facility])     mon wed fri   • DM2 (diabetes mellitus, type 2) (Formerly [facility])    • GERD (gastroesophageal reflux disease)    • Hyperlipidemia    • Hypertension    • Hypogonadism in male    • Neuropathy    • Nonsustained ventricular tachycardia (Formerly [facility]) 7/23/2021   • Obesity    • Paroxysmal atrial fibrillation (Formerly [facility]) 12/1/2020   • Sleep apnea    • Stroke (Formerly [facility])    • Unsteady gait        Past Surgical History:   Procedure Laterality Date   • ANGIOPLASTY      X2   • APPENDECTOMY     • ARTERIOVENOUS FISTULA/SHUNT SURGERY Left 1/20/2022    Procedure: LEFT BRACHIAL  CEPHALIC ARTERIAL VENOUS FISTULA CREATION;  Surgeon: Yony Davila MD;  Location: [facility] MAIN OR;  Service: Vascular;  Laterality: Left;   • CARDIAC CATHETERIZATION  11/13/2015   • CARDIAC CATHETERIZATION N/A 5/24/2021    Procedure: Left Heart Cath and coronary angiogram;  Surgeon: Jose G Rm MD;  Location:  [facility] CATH INVASIVE LOCATION;  Service: Cardiovascular;  Laterality: N/A;   • CARDIAC CATHETERIZATION  5/24/2021    Procedure: Saphenous Vein Graft;  Surgeon: Jose G Rm MD;  Location:  [facility] CATH INVASIVE LOCATION;  Service: Cardiovascular;;   • CARDIAC CATHETERIZATION N/A 5/24/2021    Procedure: Percutaneous Coronary Intervention;  Surgeon: Bernabe Zambrano MD;  Location: [facility] CATH INVASIVE LOCATION;  Service: Cardiology;  Laterality: N/A;   • CARDIAC CATHETERIZATION N/A 5/24/2021    Procedure: Stent NIELS coronary;  Surgeon: Bernabe Zambrano MD;  Location: [facility] CATH INVASIVE LOCATION;  Service: Cardiology;  Laterality: N/A;   • CARDIAC CATHETERIZATION N/A 5/26/2021    Procedure: Percutaneous Coronary Intervention;  Surgeon: Bernabe Zambrano MD;  Location:  [facility] CATH INVASIVE LOCATION;  Service: Cardiovascular;  Laterality: N/A;   • CARDIAC CATHETERIZATION N/A 5/26/2021    Procedure: Stent NIELS bypass graft;  Surgeon: Bernabe Zambrano MD;  Location:  [facility] CATH INVASIVE LOCATION;  Service: Cardiovascular;  Laterality: N/A;   • CARDIAC ELECTROPHYSIOLOGY PROCEDURE N/A 5/24/2021    Procedure: Temporary Pacemaker;  Surgeon: Bernabe Zambrano MD;  Location: [facility] CATH INVASIVE LOCATION;  Service: Cardiology;  Laterality: N/A;   • CARDIAC ELECTROPHYSIOLOGY PROCEDURE N/A 5/26/2021    Procedure: Temporary Pacemaker;  Surgeon: Bernabe Zambrano MD;  Location: [facility] CATH INVASIVE LOCATION;  Service: Cardiovascular;  Laterality: N/A;   • CARPAL TUNNEL RELEASE Left 04/29/2018    carpal tunnel- lt hand// other hand surgeries    • CATARACT EXTRACTION, BILATERAL  2002    Dr. Lux Acosta   • CHOLECYSTECTOMY     •  COLON RESECTION  2005   • CORONARY ANGIOPLASTY     • CORONARY ANGIOPLASTY WITH STENT PLACEMENT  11/13/2015    PTCA stent to proximal in stent and mid to distal lad   • CORONARY ANGIOPLASTY WITH STENT PLACEMENT  09/16/2016    PTCA stent to mid lad and stent to vein graft to marginal   • CORONARY ARTERY BYPASS GRAFT  2005    @ [facility]   • CYST REMOVAL      cyst removed from scrotum   • FOOT SURGERY Right 07/17/2018   • FOOT SURGERY Left 02/04/2019   • PORTACATH PLACEMENT     • TOE AMPUTATION Right     right toe removed D/T infected cut that went to the bone       Family History   Problem Relation Age of Onset   • Heart disease Mother    • Heart disease Father    • Diabetes Sister    • Heart disease Sister    • Diabetes Brother    • Mental illness Brother        Social History     Tobacco Use   • Smoking status: Former Smoker     Packs/day: 1.00     Years: 60.00     Pack years: 60.00     Types: Cigarettes   • Smokeless tobacco: Never Used   • Tobacco comment: Patient quit smoking 11/2020   Vaping Use   • Vaping Use: Never used   Substance Use Topics   • Alcohol use: No   • Drug use: Yes     Frequency: 1.0 times per week     Types: Marijuana     Comment: socially        Medications Prior to Admission   Medication Sig Dispense Refill Last Dose   • atorvastatin (LIPITOR) 40 MG tablet Take 40 mg by mouth Every Night.      • bisacodyl (DULCOLAX) 10 MG suppository Insert 10 mg into the rectum Daily As Needed for Constipation.      • cholecalciferol (VITAMIN D3) 25 MCG (1000 UT) tablet Take 1,000 Units by mouth Daily.      • docusate sodium (COLACE) 100 MG capsule Take 100 mg by mouth Daily.      • donepezil (ARICEPT) 10 MG tablet Take 10 mg by mouth Every Night.      • fluticasone (FLONASE) 50 MCG/ACT nasal spray 2 sprays by Each Nare route 2 (Two) Times a Day.      • gabapentin (NEURONTIN) 100 MG capsule Take 100 mg by mouth 2 (Two) Times a Day.      • Glucagon (Baqsimi One Pack) 3 MG/DOSE powder 3 mg into the nostril(s) as  directed by provider Every 12 (Twelve) Hours As Needed.      • HYDROcodone-acetaminophen (NORCO) 7.5-325 MG per tablet Take 1 tablet by mouth Every 12 (Twelve) Hours As Needed.      • insulin NPH-insulin regular (humuLIN 70/30,novoLIN 70/30) (70-30) 100 UNIT/ML injection Inject 10 Units under the skin into the appropriate area as directed Daily.      • magnesium hydroxide (MILK OF MAGNESIA) 400 MG/5ML suspension Take 15 mL by mouth Daily As Needed for Constipation.      • melatonin 3 MG tablet Take 3 mg by mouth Every Night.      • Metoprolol Tartrate 37.5 MG tablet Take 37.5 mg by mouth 2 (Two) Times a Day. Hold for SBP <110 or HR < 60      • midodrine (PROAMATINE) 10 MG tablet Take 10 mg by mouth 3 (Three) Times a Day Before Meals. Hold for BP > 140/90      • Hkewa-Wrtqc-Hzcfevg-Pramoxine (Neosporin + Pain Relief Max St) 1 % ointment Apply 1 application topically Every Night. Apply to abrasions on right inner thigh and intact blister on left inner thigh.      • Nitroglycerin 400 MCG pack Place 400 mcg under the tongue Daily As Needed (Chest pain).      • omeprazole (priLOSEC) 20 MG capsule Take 20 mg by mouth Daily.      • ondansetron ODT (ZOFRAN-ODT) 4 MG disintegrating tablet Place 4 mg on the tongue Every 8 (Eight) Hours As Needed for Nausea or Vomiting.      • phenylephrine-mineral oil-petrolatum (Preparation H) 0.25-14-74.9 % ointment hemorrhoidal ointment Insert 1 application into the rectum Daily As Needed.      • primidone (MYSOLINE) 50 MG tablet Take 25 mg by mouth Daily.      • sertraline (ZOLOFT) 50 MG tablet Take 50 mg by mouth Daily.      • tiotropium (SPIRIVA) 18 MCG per inhalation capsule Place 1 capsule into inhaler and inhale Daily.      • vitamin B-12 (CYANOCOBALAMIN) 1000 MCG tablet Take 1,000 mcg by mouth Daily.          Allergies  Codeine    Scheduled Meds:apixaban, 2.5 mg, Oral, Q12H  atorvastatin, 40 mg, Oral, Daily  cholecalciferol, 1,000 Units, Oral, Daily  docusate sodium, 100 mg, Oral,  "Daily  donepezil, 10 mg, Oral, Nightly  gabapentin, 100 mg, Oral, BID  guaiFENesin, 1,200 mg, Oral, Q12H  HYDROcodone-acetaminophen, 1 tablet, Oral, BID  insulin lispro, 0-9 Units, Subcutaneous, TID AC  insulin NPH-insulin regular, 10 Units, Subcutaneous, Daily With Breakfast  ipratropium-albuterol, 3 mL, Nebulization, TID - RT  metoprolol tartrate, 37.5 mg, Oral, BID  midodrine, 10 mg, Oral, TID AC  neomycin-bacitracin-polymyxin, 1 application, Topical, Nightly  pantoprazole, 40 mg, Oral, QAM AC  primidone, 25 mg, Oral, Daily  sertraline, 50 mg, Oral, Daily  sodium chloride, 10 mL, Intravenous, Q12H  vitamin B-12, 1,000 mcg, Oral, Daily      Continuous Infusions:   PRN Meds:.bisacodyl  •  dextrose  •  dextrose  •  glucagon (human recombinant)  •  heparin (porcine)  •  insulin lispro **AND** insulin lispro  •  ipratropium-albuterol  •  melatonin  •  nitroglycerin  •  ondansetron  •  ondansetron  •  [COMPLETED] Insert peripheral IV **AND** sodium chloride  •  sodium chloride    Objective     VITAL SIGNS  Vitals:    03/15/22 2344 03/16/22 0100 03/16/22 0411 03/16/22 0529   BP: 91/53  96/58    BP Location: Right arm  Right arm    Patient Position: Lying  Lying    Pulse: 84  83    Resp: 16  12    Temp: 97.4 °F (36.3 °C)  97.5 °F (36.4 °C)    TempSrc: Oral  Oral    SpO2: 100%  100%    Weight:  116 kg (254 lb 13.6 oz)  116 kg (254 lb 13.6 oz)   Height:           Flowsheet Rows    Flowsheet Row First Filed Value   Admission Height 177.8 cm (70\") Documented at 03/12/2022 1910   Admission Weight 113 kg (250 lb) Documented at 03/12/2022 1910            Intake/Output Summary (Last 24 hours) at 3/16/2022 0628  Last data filed at 3/15/2022 2132  Gross per 24 hour   Intake 720 ml   Output --   Net 720 ml        TELEMETRY: Atrial fibrillation    Physical Exam:  The patient is alert, oriented and in no distress.  Vital signs as noted above.  Exogenous obesity.  Head and neck revealed no carotid bruits or jugular venous distention.  " No thyromegaly or lymphadenopathy is present  Lungs clear.  No wheezing.  Breath sounds are normal bilaterally.  Heart normal first and second heart sounds.  No murmur. No precordial rub is present.  No gallop is present.  Abdomen soft and nontender.  No organomegaly is present.  Extremities with good peripheral pulses without any pedal edema.  Skin warm and dry.  Musculoskeletal system is grossly normal  CNS grossly normal      Results Review:   I reviewed the patient's new clinical results.  Lab Results (last 24 hours)     Procedure Component Value Units Date/Time    Basic Metabolic Panel [[id number]]  (Abnormal) Collected: 03/16/22 0107    Specimen: Blood Updated: 03/16/22 0238     Glucose 126 mg/dL      BUN 36 mg/dL      Creatinine 3.73 mg/dL      Sodium 127 mmol/L      Potassium 4.6 mmol/L      Chloride 93 mmol/L      CO2 22.0 mmol/L      Calcium 8.5 mg/dL      BUN/Creatinine Ratio 9.7     Anion Gap 12.0 mmol/L      eGFR 16.6 mL/min/1.73      Comment: National Kidney Foundation and American Society of Nephrology (ASN) Task Force recommended calculation based on the Chronic Kidney Disease Epidemiology Collaboration (CKD-EPI) equation refit without adjustment for race.       Narrative:      GFR Normal >60  Chronic Kidney Disease <60  Kidney Failure <15      CBC (No Diff) [[id number]]  (Abnormal) Collected: 03/16/22 0107    Specimen: Blood Updated: 03/16/22 0205     WBC 6.60 10*3/mm3      RBC 3.47 10*6/mm3      Hemoglobin 10.4 g/dL      Hematocrit 33.0 %      MCV 95.1 fL      MCH 29.8 pg      MCHC 31.4 g/dL      RDW 17.3 %      RDW-SD 59.1 fl      MPV 8.6 fL      Platelets 147 10*3/mm3     POC Glucose Once [[id number]]  (Abnormal) Collected: 03/15/22 1706    Specimen: Blood Updated: 03/15/22 1707     Glucose 196 mg/dL      Comment: Serial Number: [id number]:  [id number]       POC Glucose Once [[id number]]  (Abnormal) Collected: 03/15/22 1135    Specimen: Blood Updated: 03/15/22 1137     Glucose 146 mg/dL       Comment: Serial Number: [id number]:  [id number]       POC Glucose Once [[id number]]  (Abnormal) Collected: 03/15/22 0722    Specimen: Blood Updated: 03/15/22 0723     Glucose 140 mg/dL      Comment: Serial Number: [id number]:  [id number]             Imaging Results (Last 24 Hours)     ** No results found for the last 24 hours. **      LAB RESULTS (LAST 7 DAYS)    CBC  Results from last 7 days   Lab Units 03/16/22 0107 03/13/22 2312 03/12/22 2023   WBC 10*3/mm3 6.60 4.40 7.20   RBC 10*6/mm3 3.47* 3.39* 3.45*   HEMOGLOBIN g/dL 10.4* 10.0* 10.5*   HEMATOCRIT % 33.0* 32.1* 32.9*   MCV fL 95.1 94.8 95.5   PLATELETS 10*3/mm3 147 190 177       BMP  Results from last 7 days   Lab Units 03/16/22 0107 03/13/22 2312 03/13/22 0408 03/12/22 2102   SODIUM mmol/L 127* 131* 132* 132*   POTASSIUM mmol/L 4.6 5.4* 5.2 4.7   CHLORIDE mmol/L 93* 95* 95* 97*   CO2 mmol/L 22.0 23.0 22.0 24.0   BUN mg/dL 36* 40* 30* 24*   CREATININE mg/dL 3.73* 4.01* 3.64* 3.62*   GLUCOSE mg/dL 126* 208* 171* 126*   PHOSPHORUS mg/dL  --   --   --  3.9       CMP   Results from last 7 days   Lab Units 03/16/22 0107 03/13/22 2312 03/13/22 0408 03/12/22 2102   SODIUM mmol/L 127* 131* 132* 132*   POTASSIUM mmol/L 4.6 5.4* 5.2 4.7   CHLORIDE mmol/L 93* 95* 95* 97*   CO2 mmol/L 22.0 23.0 22.0 24.0   BUN mg/dL 36* 40* 30* 24*   CREATININE mg/dL 3.73* 4.01* 3.64* 3.62*   GLUCOSE mg/dL 126* 208* 171* 126*   ALBUMIN g/dL  --   --   --  3.40*   BILIRUBIN mg/dL  --   --   --  0.6   ALK PHOS U/L  --   --   --  55   AST (SGOT) U/L  --   --   --  12   ALT (SGPT) U/L  --   --   --  8         BNP        TROPONIN  Results from last 7 days   Lab Units 03/12/22  2102   TROPONIN T ng/mL 0.146*       CoAg        Creatinine Clearance  Estimated Creatinine Clearance: 23.2 mL/min (A) (by C-G formula based on SCr of 3.73 mg/dL (H)).    ABG        Radiology  MRI Brain Without Contrast    Result Date: 3/14/2022   Tiny 5 mm focus of signal change along the  periphery of the known remote left occipital infarct favored to be artifactual although a tiny solitary focus of acute ischemia cannot be entirely excluded. No evidence of acute ischemic infarct elsewhere.  Motion limited examination. No definitive areas of intracranial hemorrhage although recommend further evaluation with a noncontrast CT of the head.  Large amount of diffuse signal change of the brain consistent with chronic vascular ischemia.   Electronically Signed By-Zaire Irving On:3/14/2022 4:05 PM This report was finalized on [id number] by  Zaire Irving, .              EKG            I personally viewed and interpreted the patient's EKG/Telemetry data:    ECHOCARDIOGRAM:    Results for orders placed during the hospital encounter of 03/12/22    Adult Transthoracic Echo Complete w/ Color, Spectral and Contrast if necessary per protocol    Interpretation Summary  · Left ventricular ejection fraction appears to be 26 - 30%. Left ventricular systolic function is severely decreased.  · Estimated right ventricular systolic pressure from tricuspid regurgitation is moderately elevated (45-55 mmHg). Calculated right ventricular systolic pressure from tricuspid regurgitation is 50 mmHg.  · The right ventricular cavity is mild to moderately dilated.  · There is a moderate sized left pleural effusion.          STRESS MYOVIEW:    Cardiolite (Tc-99m Sestamibi) stress test    CARDIAC CATHETERIZATION:            OTHER:         Assessment/Plan     Principal Problem:    Fluid overload  Active Problems:    Major depressive disorder, recurrent, mild (HCC)    Unspecified dementia with behavioral disturbance (HCC)    Coronary artery disease    Vitamin D deficiency    COPD with acute exacerbation (HCC)    Type 2 diabetes mellitus with hyperglycemia (HCC)    End stage renal disease (HCC)    Congestive heart failure, unspecified HF chronicity, unspecified heart failure type (HCC)    Chronic respiratory failure with hypoxia, on home  oxygen therapy (HCC)    Obesity (BMI 30-39.9)    [facility] with complaint of shortness of breath.  He was discharged from this facility on 3/10/2022 for hypotension following dialysis and acute on chronic systolic heart failure.  Patient stated that he is supposed to be receiving breathing treatments but was told by the nurse at [facility] that they are not allowed to give breathing treatments.  He denies any chest pain, pressure, tightness, palpitations.  No nausea, vomiting or diarrhea.  Upon my evaluation, his breath sounds are quite coarse and audible.  He has occasional congested cough.  EKG shows atrial fibrillation with left bundle branch block at 106.  Blood pressure is quite stable 133/67.  He has no lower extremity edema.  He is currently on nasal cannula at 5L.  Patient reports that although he does make some urine it is very little.    [[[[[[[[[[[[[[[[[[[[[[[[[    Impression  ==============  -Acute on chronic fluid overload.     -status post CABG 2005. status post stent to LAD 2009    Status post stent to LAD 11/13/2015  Status post stent to mid LAD and SVG to marginal branch 09/16/2016.  Status post stent to mid LAD 5/24/2021  Status post stent to SVG to RCA 5/26/2021     Cardiac catheterization 5/24/2021 revealed  Left ventricle angiogram was not performed due to pre-existing renal dysfunction.  Left main coronary artery normal.  Left anterior descending artery has mid segment lengthy 90% disease within the previously placed stent.  Distal left into descending artery has diffuse disease.  Circumflex coronary artery is totally occluded.  (Chronic)  Right coronary artery is chronically occluded.  SVG to marginal branch (jump graft to OM1 and OM 2) is totally occluded (new finding compared to 2015.  SVG to PDA has distal radiolucency.  However no definite obstructive disease is present.       -status post myocardial  infarction 2000 and 2002 .      -history of intermittent but mostly persistent atrial fibrillation     -Acute on chronic congestive heart failure.  Compensated at this time.     Recent echocardiogram showed left ventricle dysfunction with ejection fraction of 35%.     -History of right-sided weakness with left MCA territory stroke.-Improved     Transesophageal echocardiogram 11/30/2020.  Biatrial enlargement.  Smoking effect in the left atrium and left ventricle and left atrial appendage.  Mild mitral and aortic regurgitation.  Left ventricle enlargement with diffuse hypocontractility with ejection fraction of 35 to 40%.     Echocardiogram 11/27/2020 revealed left atrial enlargement left ventricle dysfunction with ejection fraction of 40%.  Negative bubble study.      -diabetes hypertension and sleep apnea.  ESRD.     -status post colon surgery (partial colectomy) appendectomy cholecystectomy right 4th toe removal and carpal tunnel surgery      -continued smoking 1 pack per day -abstinence from smoking      -allergy to codeine.  ============   Plan  ================  Acute on chronic fluid overload.  Doing better    Status post CABG  Patient is not having any angina pectoris or congestive heart failure     Atrial fibrillation-chronic  Rate is better controlled  Patient is on Coreg at 12.5 mg p.o. twice a day amiodarone and Cardizem.      ESRD  Patient is undergoing dialysis today.      Status post stent to LAD 5/24/2021.  Status post stent to SVG to RCA 5/26/2021.       Anticoagulation  Patient was on Eliquis 5 mg twice a day.  Observe for toxic effects.  Patient is on Eliquis.    Echocardiogram 3/12/2022 revealed ejection fraction of 25 to 30%.    Consider biventricular ICD if left ventricular function does not improve or congestive heart failure gets worse.    Further plan will depend on patient's progress.   ]]]]]]]]]]]]]]]]]]]]]]          Jose G Rm MD  03/16/22  06:28 EDT

## 2022-03-16 NOTE — PLAN OF CARE
Goal Outcome Evaluation:           Progress: no change  Outcome Evaluation: Patient resting in bed. No complaints of pain or discomfort. Patient had dialysis today tolerated well. patient complained of being unable to see upon returning from dialysis after OT worked with patient. Patients orthostatic vital signs were katie, NIH screen was 0. MD made aware of symptoms, no new orders. Patient's vision is better now. waiting for rehab placement. will continue to monitor.
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
## 2022-03-16 NOTE — CASE MANAGEMENT/SOCIAL WORK
Continued Stay Note  DOREEN Amin     Patient Name: Benson Mclean  MRN: [id number]  Today's Date: 3/16/2022    Admit Date: 3/12/2022     Discharge Plan     Row Name 03/16/22 1414       Plan    Plan Comments Birdie Hramon will start precert when updated PT/OT notes are in.  Secure chat sent to MD this am for PT/OT order.  HD today.                          Denia Thakkar RN
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
## 2022-03-16 NOTE — PROGRESS NOTES
[facility] Medicine Services Daily Progress Note    Patient Name: Benson Mclean  : 1950  MRN: [id number]  Primary Care Physician:  Rudolph Rooney MD  Date of admission: 3/12/2022      Subjective      Chief Complaint: Shortness of breath with wheezing      Patient Reports   3/15/2022:   Patient reports feeling fine with no specific complaints.  3/16/2022:  I spoke with the bedside nurse. The patient went to dialysis this morning and then later had an episode of feeling like his vision went black when he was working with physical therapy in an upright position.  The symptoms resolved when he was placed back in bed.  Orthostatic vitals were checked and he did not drop with changing from lying to sitting position.  When I subsequently saw the patient he complained of persistent vision loss.  He describes it as seeing shadows and is having trouble identifying what is on the TV or what is on his dinner plate.  CT scan of the head was ordered.  He denies any weakness on one side of his body or difficulty with speech or swallowing.    ROS   12 point review of systems negative including no complaints of chest pain or lightheadedness, swelling or shortness of breath      Objective      Vitals:   Temp:  [97.4 °F (36.3 °C)-97.6 °F (36.4 °C)] 97.5 °F (36.4 °C)  Heart Rate:  [] 95  Resp:  [12-16] 16  BP: ()/(53-81) 110/70  Flow (L/min):  [3] 3    Physical Exam   Vital signs and nurses notes reviewed.  Morbidly obese gentleman sitting up in bed awake and alert in no acute distress; sclera anicteric, mucous membranes moist; dialysis catheter right anterior chest; lungs clear to auscultation with decreased air entry bilaterally; CV regular rate and rhythm; abdomen soft, nondistended and nontender; extremities with AV fistula left arm; no lower extremity edema, cyanosis or calf tenderness.  Exam unchanged from 3/15/2022 except for the reports of bilateral vision loss.       Result  Review    Result Review:  I have personally reviewed the results from the time of this admission to 3/16/2022 18:38 EDT and agree with these findings:  [x]  Laboratory  [x]  Microbiology  [x]  Radiology  [x]  EKG/Telemetry   [x]  Cardiology/Vascular   []  Pathology  []  Old records  []  Other:  Most notable findings discussed in the assessment and plan.    Wounds (last 24 hours)     LDA Wound     Row Name 03/16/22 0701 03/16/22 0300 03/15/22 2344       Wound 03/07/22 1806 Left anterior thigh Burn    Wound - Properties Group Placement Date: 03/07/22  -MG Placement Time: 1806  -MG Side: Left  -MG Orientation: anterior  -MG Location: thigh  -MG Primary Wound Type: Burn  -KK    Dressing Appearance open to air  -AH -- open to air  -ED    Edges irregular  -AH irregular  -ED irregular  -ED    Drainage Characteristics/Odor -- serosanguineous  -ED serosanguineous  -ED    Drainage Amount scant  -AH scant  -ED scant  -ED    Care, Wound -- -- cleansed with;soap and water  -ED    Dressing Care foam  -AH -- --    Retired Wound - Properties Group Placement Date: 03/07/22  -MG Placement Time: 1806  -MG Side: Left  -MG Orientation: anterior  -MG Location: thigh  -MG Primary Wound Type: Burn  -KK    Retired Wound - Properties Group Date first assessed: 03/07/22  -MG Time first assessed: 1806  -MG Side: Left  -MG Location: thigh  -MG Primary Wound Type: Burn  -KK       Wound 03/07/22 1805 Right anterior thigh Burn    Wound - Properties Group Placement Date: 03/07/22  -MG Placement Time: 1805  -MG Present on Hospital Admission: Y  -MG Side: Right  -MG Orientation: anterior  -MG Location: thigh  -MG Primary Wound Type: Burn  -KK    Dressing Appearance open to air  -AH -- open to air  -ED    Edges irregular  -AH irregular  -ED irregular  -ED    Drainage Characteristics/Odor serosanguineous  -AH serosanguineous  -ED serosanguineous  -ED    Drainage Amount scant  -AH scant  -ED scant  -ED    Care, Wound -- -- cleansed with;soap and water   -ED    Dressing Care open to air  -AH -- open to air  -ED    Retired Wound - Properties Group Placement Date: 03/07/22  -MG Placement Time: 1805  -MG Present on Hospital Admission: Y  -MG Side: Right  -MG Orientation: anterior  -MG Location: thigh  -MG Primary Wound Type: Burn  -KK    Retired Wound - Properties Group Date first assessed: 03/07/22  -MG Time first assessed: 1805  -MG Present on Hospital Admission: Y  -MG Side: Right  -MG Location: thigh  -MG Primary Wound Type: Burn  -KK    Row Name 03/15/22 2200 03/15/22 1901          Wound 03/07/22 1806 Left anterior thigh Burn    Wound - Properties Group Placement Date: 03/07/22  -MG Placement Time: 1806  -MG Side: Left  -MG Orientation: anterior  -MG Location: thigh  -MG Primary Wound Type: Burn  -KK     Dressing Appearance open to air  -ED open to air  -KD     Edges irregular  -ED --     Drainage Characteristics/Odor serosanguineous  -ED --     Drainage Amount scant  -ED --     Retired Wound - Properties Group Placement Date: 03/07/22  -MG Placement Time: 1806  -MG Side: Left  -MG Orientation: anterior  -MG Location: thigh  -MG Primary Wound Type: Burn  -KK     Retired Wound - Properties Group Date first assessed: 03/07/22  -MG Time first assessed: 1806  -MG Side: Left  -MG Location: thigh  -MG Primary Wound Type: Burn  -KK            Wound 03/07/22 1805 Right anterior thigh Burn    Wound - Properties Group Placement Date: 03/07/22  -MG Placement Time: 1805  -MG Present on Hospital Admission: Y  -MG Side: Right  -MG Orientation: anterior  -MG Location: thigh  -MG Primary Wound Type: Burn  -KK     Dressing Appearance open to air  -ED open to air  -KD     Edges irregular  -ED --     Drainage Characteristics/Odor serosanguineous  -ED serosanguineous  -KD     Drainage Amount scant  -ED --     Dressing Care open to air  -ED --     Retired Wound - Properties Group Placement Date: 03/07/22  -MG Placement Time: 1805  -MG Present on Hospital Admission: Y  -MG Side: Right   -MG Orientation: anterior  -MG Location: thigh  -MG Primary Wound Type: Burn  -KK     Retired Wound - Properties Group Date first assessed: 03/07/22  -MG Time first assessed: 1805  -MG Present on Hospital Admission: Y  -MG Side: Right  -MG Location: thigh  -MG Primary Wound Type: Burn  -KK           User Key  (r) = Recorded By, (t) = Taken By, (c) = Cosigned By    Initials Name Provider Type    Leonard Lopez, RN Registered Nurse    Ashia Castaneda RN Registered Nurse    Radha Quispe RN Registered Nurse    Ebony Davis RN Registered Nurse    Karis Khan RN Registered Nurse                  Assessment/Plan      Brief Patient Summary:  Benson Mclean is a 71 y.o. male with a history of end-stage renal disease, chronic systolic congestive heart failure due to ischemic cardiomyopathy, CAD status post CABG and stents, chronic atrial fibrillation, CVA with residual right-sided weakness, and chronic hypoxic respiratory failure due to COPD who presented to the emergency room from [facility] where he was noted to be having trouble breathing.  proBNP was greater than 70,000.  The patient received Solu-Medrol, IV Lasix and duo nebs in the emergency room and was improved.  Nephrology was consulted and hemodialysis was ordered.    Patient had gradual improvement in his labs.  His swelling and his urine output improved during the course of the hospital stay.  He is medically ready for discharge and is awaiting precertification for inpatient rehab.    Current inpatient medications include:  apixaban, 2.5 mg, Oral, Q12H  atorvastatin, 40 mg, Oral, Daily  cholecalciferol, 1,000 Units, Oral, Daily  docusate sodium, 100 mg, Oral, Daily  donepezil, 10 mg, Oral, Nightly  gabapentin, 100 mg, Oral, BID  guaiFENesin, 1,200 mg, Oral, Q12H  HYDROcodone-acetaminophen, 1 tablet, Oral, BID  insulin lispro, 0-9 Units, Subcutaneous, TID AC  insulin NPH-insulin regular, 10 Units, Subcutaneous, Daily With  Breakfast  ipratropium-albuterol, 3 mL, Nebulization, TID - RT  metoprolol tartrate, 37.5 mg, Oral, BID  midodrine, 10 mg, Oral, TID AC  neomycin-bacitracin-polymyxin, 1 application, Topical, Nightly  pantoprazole, 40 mg, Oral, QAM AC  primidone, 25 mg, Oral, Daily  sertraline, 50 mg, Oral, Daily  sodium chloride, 10 mL, Intravenous, Q12H  vitamin B-12, 1,000 mcg, Oral, Daily             Active Hospital Problems:  Active Hospital Problems    Diagnosis    • **Fluid overload    • Chronic respiratory failure with hypoxia, on home oxygen therapy (HCC)    • Obesity (BMI 30-39.9)    • Congestive heart failure, unspecified HF chronicity, unspecified heart failure type ([facility])    • End stage renal disease ([facility])    • Type 2 diabetes mellitus with hyperglycemia ([facility])    • COPD with acute exacerbation ([facility])    • Major depressive disorder, recurrent, mild ([facility])    • Unspecified dementia with behavioral disturbance ([facility])    • Coronary artery disease    • Vitamin D deficiency      Plan:     Acute vision loss, new problem on 3/16/2022  -CT scan of the head ordered  -Patient is already on apixaban  -We will add aspirin if there is an acute CVA as long as there is no intracranial hemorrhage    Acute on chronic hypoxic respiratory failure multifactorial due to fluid overload due to missed dialysis in end-stage renal disease patient, acute on chronic CHF and COPD exacerbation    Acute exacerbation of COPD, mild  -Continue DuoNebs, guaifenesin, inhaled steroids     Acute on chronic systolic congestive heart failure secondary to ischemic cardiomyopathy and pulmonary hypertension  -Dialysis ordered for fluid removal    CAD status post CABG and stents  Hyperlipidemia  Chronic atrial fibrillation  Hypertension of CKD  -Continue home statin and metoprolol  -No anticoagulation secondary to history of brain bleed    End-stage renal disease on hemodialysis  -Nephrology consulting    Anemia of CKD  -Hemoglobin currently stable at  10.0    Insulin-dependent diabetes mellitus with diabetic peripheral neuropathy  -Hemoglobin A1c 6.3 on 3/12/2022  -Continue home insulin and gabapentin  -SSI    Obstructive sleep apnea  -Encourage CPAP compliance    GERD  -Continue PPI    Morbid obesity  -Risk factor modification counseling    Dementia with behavioral disturbance  -Continue donepezil and sertraline    Tremor  -Continue primidone    Vitamin B12 MND deficiency  -Continue supplements    DVT prophylaxis:  Medical and mechanical DVT prophylaxis orders are present.    CODE STATUS:    Level Of Support Discussed With: Patient  Code Status (Patient has no pulse and is not breathing): CPR (Attempt to Resuscitate)  Medical Interventions (Patient has pulse or is breathing): Full Support      Disposition:  I expect patient to be discharged in 1 to 2 days if clinically improved.    This patient has been examined wearing appropriate Personal Protective Equipment and discussed with hospital infection control department. 03/16/22      Electronically signed by Kay Car MD, 03/16/22, 18:38 EDT.  [facility] Hospitalist Team

## 2022-03-17 NOTE — PLAN OF CARE
Goal Outcome Evaluation:            Assessment: Benson Mclean presents with ADL impairments below baseline abilities which indicate the need for continued skilled intervention while inpatient. He seems to have further declined since evaluation, now requiring max assist of 2 for bed mobility and attempt to stand. He was unable to extend hips and knees in order to fully stand at the side of bed, attempted twice. He required mod assist to sit EOB between attempts. Tolerating session today without incident. Will continue to follow and progress as tolerated.      Plan/Recommendations:   Pt would benefit from Skilled Rehab placement at discharge from facility.   Pt desires Skilled Rehab placement at discharge. Pt cooperative; agreeable to therapeutic recommendations and plan of care.

## 2022-03-17 NOTE — PLAN OF CARE
Goal Outcome Evaluation:  70 y/o M presents from [facility] rehab facility with c/o difficulty with breathing. Pt was D/C'd from [facility] 3 days ago after medically treated for hypotension following dialysis bedside acute on chronic systolic CHF. Pt was evaluated by OT on 03/16, and he was alert and oriented, required min -mod A for functional mobility. However, pt had a rapid medical changes and reported severe vision deficit last night. MRI indicated evidence of acute infarct within the R occipital lobe. On this date, pt was confused and disoriented. Pt demonstrated slurred, delayed and nonsensical speech about 50% of the time. Pt is a poor historian so unable to obtain accurate history. Per OT note, pt requires max A for all ADLs in rehab facility. This date, pt required mod-max A x2 for bed mobility. When sitting on EOB, pt displayed posterior leaning required min-mod A for balance. STS transfer attempted with max A x2 but was not able to complete. Pt had a bowel incontinence after standing trial. Pt horizontal visual tracking present but delayed. However, the perceived object appear blurry and required rapid movement for it to be visually perceived by the patient. No vertical visual tracking noted. At this time, recommending IP rehab to ensure patient safety.

## 2022-03-17 NOTE — CONSULTS
Diabetes Education    Patient Name:  Benson Mclean  YOB: 1950  MRN: [id number]  Admit Date:  3/12/2022    Consult received per stroke protocol being initiated. Pt had A1c done when first admitted on 3/12/2022 and result 6.3%. Pt from [facility]. Inpatient diabetes educator at [facility] had visited pt on 3/8/2022 during previous adm to [facility]. Pt confirmed nursing staff administering insulin and checking bs. Per CM note on 3/15/2022, pt would be returning to a facility at discharged. No education needed at this time.      Electronically signed by:  Lucia Márquez RN  03/17/22 11:15 EDT
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
## 2022-03-17 NOTE — NURSING NOTE
Reported to this nurse from the off going shift this evening that patient has had complaint of vision deficits today.  It was stated that he told the nurse that he could not see and that his vision would come and go.  This nurse went to patient room to help him with his supper tray and asked if he was still having trouble with his vision and he stated that he was ok at that time.  Nurse prepared patient tray for him and left the room to address another matter and to get this patient medication.  Dr. Car was on the unit and had went into the patient room and while there patient had told her that he had vision issues today.  Dr. Car ordered Stat CT and patient was transported to imaging.  Report for CT resulted that there is infarct of the right occipital lobe that was not there prior.  Patient returned to floor and was given 81mg ASA and was made NPO pending dysphagia screen.  He also was to be transferred to the [facility] room 270 for stroke care.  Patient was transferred to room 270 and report given to LOKESH Arcos.  She conducted NIHSS at that time. Dr. Moran gave order for the patient to have a scan with contrast but the patient is a hemodialysis patient and Dr. Rios with nephrology was contacted for clearance to give this patient contrast due to his compromised kidney function.  Patient was cleared for the scan.  Reported to Josselyn that CT needed to be contacted when the patient was ready to be taken to CT.  Also reported that patient had not been given any nighttime medications at time of tranfer. Patient wife was contacted by this nurse regarding transfer to [facility] and CT results.

## 2022-03-17 NOTE — CONSULTS
Primary Care Provider: Rudolph Rooney MD     Consult requested by:  Keara Cardozo NP    Reason for Consultation: Neurological evaluation, right occipital stroke     History taken from: patient chart family    Chief complaint: vision changes        SUBJECTIVE:    History of present illness: Background per H&P: 71-year-old  male, morbidly obese with extensive medical history as outlined below among which are ESRD [dialysis on Monday/Wednesday/Fridays], chronic systolic CHF, CAD-s/p CABG and stent placement, chronic A. fib, stroke with residual right-sided weakness, and O2 dependent COPD, presented to the ED via EMS coming from [facility] rehab facility for complaint of trouble breathing.  He was discharged from our hospital 2 days ago after staying 3 days treating him for hypotension following dialysis beside acute on chronic systolic CHF.  He was sent to the [facility] for rehab.  Patient states that he is supposed to get breathing treatments but was told by the nurse at [facility] that they are not allowed to give breathing treatment!  He denies having chest pain, cough, fever or chills, palpitation, nausea vomiting or diarrhea or any other complaint.    3/16-patient complained to nursing staff that he was having visual changes, Dr. Car evaluated and ordered stat CT head.  Dr. Moran was called and orders for CTA head and neck, MRI, stroke work-up were ordered at that time.     - Portions of the above HPI were copied from previous encounters and edited as appropriate. PMH as detailed below.     Pt is a poor historian, no family at bedside. Pt unable to describe his visual changes.     Review of Systems   Unable to perform ROS: Acuity of condition        PATIENT HISTORY:  Past Medical History:   Diagnosis Date   • A-fib ([facility]) 8/3/2021   • Anemia    • Appetite loss    • Arthritis    • Brain bleed ([facility])    • Carpal tunnel syndrome on left    • CHF (congestive heart failure) ([facility])    • Chronic left-sided low back  pain without sciatica 12/27/2017   • CKD (chronic kidney disease) stage 3, GFR 30-59 ml/min ([facility])    • Closed fracture of seventh thoracic vertebra ([facility]) 8/23/2020   • Cognitive communication deficit 12/1/2020   • COPD (chronic obstructive pulmonary disease) ([facility])    • Coronary artery disease    • COVID-19 2/19/2021   • Cytokine release syndrome, grade 1 5/24/2021   • Depression    • Diabetic neuropathy ([facility])    • Dialysis patient ([facility])     mon wed fri   • DM2 (diabetes mellitus, type 2) ([facility])    • GERD (gastroesophageal reflux disease)    • Hyperlipidemia    • Hypertension    • Hypogonadism in male    • Neuropathy    • Nonsustained ventricular tachycardia ([facility]) 7/23/2021   • Obesity    • Paroxysmal atrial fibrillation ([facility]) 12/1/2020   • Sleep apnea    • Stroke ([facility])    • Unsteady gait    ,   Past Surgical History:   Procedure Laterality Date   • ANGIOPLASTY      X2   • APPENDECTOMY     • ARTERIOVENOUS FISTULA/SHUNT SURGERY Left 1/20/2022    Procedure: LEFT BRACHIAL CEPHALIC ARTERIAL VENOUS FISTULA CREATION;  Surgeon: Yony Davila MD;  Location: [facility] MAIN OR;  Service: Vascular;  Laterality: Left;   • CARDIAC CATHETERIZATION  11/13/2015   • CARDIAC CATHETERIZATION N/A 5/24/2021    Procedure: Left Heart Cath and coronary angiogram;  Surgeon: Jose G Rm MD;  Location: [facility] CATH INVASIVE LOCATION;  Service: Cardiovascular;  Laterality: N/A;   • CARDIAC CATHETERIZATION  5/24/2021    Procedure: Saphenous Vein Graft;  Surgeon: Jose G Rm MD;  Location: [facility] CATH INVASIVE LOCATION;  Service: Cardiovascular;;   • CARDIAC CATHETERIZATION N/A 5/24/2021    Procedure: Percutaneous Coronary Intervention;  Surgeon: Bernabe Zambrano MD;  Location:  [facility] CATH INVASIVE LOCATION;  Service: Cardiology;  Laterality: N/A;   • CARDIAC CATHETERIZATION N/A 5/24/2021    Procedure: Stent NIELS coronary;  Surgeon: Bernabe Zambrano MD;  Location: [facility] CATH INVASIVE LOCATION;  Service: Cardiology;  Laterality: N/A;    • CARDIAC CATHETERIZATION N/A 5/26/2021    Procedure: Percutaneous Coronary Intervention;  Surgeon: Bernabe Zambrano MD;  Location: [facility] CATH INVASIVE LOCATION;  Service: Cardiovascular;  Laterality: N/A;   • CARDIAC CATHETERIZATION N/A 5/26/2021    Procedure: Stent NIELS bypass graft;  Surgeon: Bernabe Zambrano MD;  Location: [facility] CATH INVASIVE LOCATION;  Service: Cardiovascular;  Laterality: N/A;   • CARDIAC ELECTROPHYSIOLOGY PROCEDURE N/A 5/24/2021    Procedure: Temporary Pacemaker;  Surgeon: Bernabe Zambrano MD;  Location: [facility] CATH INVASIVE LOCATION;  Service: Cardiology;  Laterality: N/A;   • CARDIAC ELECTROPHYSIOLOGY PROCEDURE N/A 5/26/2021    Procedure: Temporary Pacemaker;  Surgeon: Bernabe Zambrano MD;  Location: [facility] CATH INVASIVE LOCATION;  Service: Cardiovascular;  Laterality: N/A;   • CARPAL TUNNEL RELEASE Left 04/29/2018    carpal tunnel- lt hand// other hand surgeries    • CATARACT EXTRACTION, BILATERAL  2002    Dr. Lux Acosta   • CHOLECYSTECTOMY     • COLON RESECTION  2005   • CORONARY ANGIOPLASTY     • CORONARY ANGIOPLASTY WITH STENT PLACEMENT  11/13/2015    PTCA stent to proximal in stent and mid to distal lad   • CORONARY ANGIOPLASTY WITH STENT PLACEMENT  09/16/2016    PTCA stent to mid lad and stent to vein graft to marginal   • CORONARY ARTERY BYPASS GRAFT  2005    @ [facility]   • CYST REMOVAL      cyst removed from scrotum   • FOOT SURGERY Right 07/17/2018   • FOOT SURGERY Left 02/04/2019   • PORTACATH PLACEMENT     • TOE AMPUTATION Right     right toe removed D/T infected cut that went to the bone   ,   Family History   Problem Relation Age of Onset   • Heart disease Mother    • Heart disease Father    • Diabetes Sister    • Heart disease Sister    • Diabetes Brother    • Mental illness Brother    ,   Social History     Tobacco Use   • Smoking status: Former Smoker     Packs/day: 1.00     Years: 60.00     Pack years: 60.00     Types: Cigarettes   • Smokeless tobacco: Never Used   • Tobacco comment:  Patient quit smoking 11/2020   Vaping Use   • Vaping Use: Never used   Substance Use Topics   • Alcohol use: No   • Drug use: Yes     Frequency: 1.0 times per week     Types: Marijuana     Comment: socially   ,   Prior to Admission medications    Medication Sig Start Date End Date Taking? Authorizing Provider   atorvastatin (LIPITOR) 40 MG tablet Take 40 mg by mouth Every Night.   Yes Timo Austin MD   bisacodyl (DULCOLAX) 10 MG suppository Insert 10 mg into the rectum Daily As Needed for Constipation.   Yes Timo Austin MD   cholecalciferol (VITAMIN D3) 25 MCG (1000 UT) tablet Take 1,000 Units by mouth Daily.   Yes Timo Austin MD   docusate sodium (COLACE) 100 MG capsule Take 100 mg by mouth Daily.   Yes Timo Austin MD   donepezil (ARICEPT) 10 MG tablet Take 10 mg by mouth Every Night.   Yes Timo Austin MD   fluticasone (FLONASE) 50 MCG/ACT nasal spray 2 sprays by Each Nare route 2 (Two) Times a Day.   Yes Timo Austin MD   gabapentin (NEURONTIN) 100 MG capsule Take 100 mg by mouth 2 (Two) Times a Day.   Yes Timo Austin MD   Glucagon (Baqsimi One Pack) 3 MG/DOSE powder 3 mg into the nostril(s) as directed by provider Every 12 (Twelve) Hours As Needed.   Yes Timo Austin MD   HYDROcodone-acetaminophen (NORCO) 7.5-325 MG per tablet Take 1 tablet by mouth Every 12 (Twelve) Hours As Needed.   Yes Timo Austin MD   insulin NPH-insulin regular (humuLIN 70/30,novoLIN 70/30) (70-30) 100 UNIT/ML injection Inject 10 Units under the skin into the appropriate area as directed Daily.   Yes Timo Austin MD   magnesium hydroxide (MILK OF MAGNESIA) 400 MG/5ML suspension Take 15 mL by mouth Daily As Needed for Constipation.   Yes Timo Austin MD   melatonin 3 MG tablet Take 3 mg by mouth Every Night.   Yes Timo Austin MD   Metoprolol Tartrate 37.5 MG tablet Take 37.5 mg by mouth 2 (Two) Times a Day. Hold for SBP <110 or  HR < 60   Yes ProviderTimo MD   midodrine (PROAMATINE) 10 MG tablet Take 10 mg by mouth 3 (Three) Times a Day Before Meals. Hold for BP > 140/90   Yes Timo Austin MD   Lsine-Fduop-Xrblxgs-Pramoxine (Neosporin + Pain Relief Max St) 1 % ointment Apply 1 application topically Every Night. Apply to abrasions on right inner thigh and intact blister on left inner thigh.   Yes Timo Austin MD   Nitroglycerin 400 MCG pack Place 400 mcg under the tongue Daily As Needed (Chest pain).   Yes Timo Austin MD   omeprazole (priLOSEC) 20 MG capsule Take 20 mg by mouth Daily.   Yes Timo Austin MD   ondansetron ODT (ZOFRAN-ODT) 4 MG disintegrating tablet Place 4 mg on the tongue Every 8 (Eight) Hours As Needed for Nausea or Vomiting.   Yes ProviderTimo MD   phenylephrine-mineral oil-petrolatum (Preparation H) 0.25-14-74.9 % ointment hemorrhoidal ointment Insert 1 application into the rectum Daily As Needed.   Yes ProviderTimo MD   primidone (MYSOLINE) 50 MG tablet Take 25 mg by mouth Daily.   Yes ProviderTimo MD   sertraline (ZOLOFT) 50 MG tablet Take 50 mg by mouth Daily.   Yes ProviderTimo MD   tiotropium (SPIRIVA) 18 MCG per inhalation capsule Place 1 capsule into inhaler and inhale Daily.   Yes ProviderTimo MD   vitamin B-12 (CYANOCOBALAMIN) 1000 MCG tablet Take 1,000 mcg by mouth Daily.   Yes ProviderTimo MD    Allergies:  Codeine    Current Facility-Administered Medications   Medication Dose Route Frequency Provider Last Rate Last Admin   • aspirin chewable tablet 81 mg  81 mg Oral Daily Kay Car MD   81 mg at 03/16/22 2159   • atorvastatin (LIPITOR) tablet 40 mg  40 mg Oral Daily Yordan Hackett MD   40 mg at 03/16/22 1237   • bisacodyl (DULCOLAX) suppository 10 mg  10 mg Rectal Daily PRN Yordan Hackett MD       • cholecalciferol (VITAMIN D3) tablet 1,000 Units  1,000 Units Oral Daily Yordan Hackett MD   1,000 Units at 03/16/22  1236   • dextrose (D50W) (25 g/50 mL) IV injection 25 g  25 g Intravenous Q15 Min PRN Yordan Hackett MD       • dextrose (GLUTOSE) oral gel 15 g  15 g Oral Q15 Min PRN Yordan Hackett MD       • docusate sodium (COLACE) capsule 100 mg  100 mg Oral Daily Yordan Hackett MD   100 mg at 03/16/22 1236   • donepezil (ARICEPT) tablet 10 mg  10 mg Oral Nightly Yordan Hackett MD   10 mg at 03/15/22 2142   • gabapentin (NEURONTIN) capsule 100 mg  100 mg Oral BID Yordan Hackett MD   100 mg at 03/16/22 1236   • glucagon (human recombinant) (GLUCAGEN DIAGNOSTIC) 1 mg in sterile water (preservative free) 1 mL injection  1 mg Intramuscular Q15 Min PRN Yordan Hackett MD       • guaiFENesin (MUCINEX) 12 hr tablet 1,200 mg  1,200 mg Oral Q12H Kay Car MD   1,200 mg at 03/16/22 1236   • heparin (porcine) injection 5,000 Units  5,000 Units Intracatheter PRN Yony Bhat MD   5,000 Units at 03/16/22 0839   • HYDROcodone-acetaminophen (NORCO) 7.5-325 MG per tablet 1 tablet  1 tablet Oral BID Yordan Hackett MD   1 tablet at 03/16/22 1236   • insulin lispro (ADMELOG) injection 0-9 Units  0-9 Units Subcutaneous TID AC Yordan Hackett MD   2 Units at 03/16/22 1641    And   • insulin lispro (ADMELOG) injection 0-9 Units  0-9 Units Subcutaneous PRN Yordan Hackett MD       • insulin NPH-insulin regular (humuLIN 70/30,novoLIN 70/30) injection 10 Units  10 Units Subcutaneous Daily With Breakfast Yordan Hackett MD   10 Units at 03/15/22 0822   • ipratropium-albuterol (DUO-NEB) nebulizer solution 3 mL  3 mL Nebulization Q2H PRN Kay Car MD       • ipratropium-albuterol (DUO-NEB) nebulizer solution 3 mL  3 mL Nebulization TID - RT Kay Car MD   3 mL at 03/17/22 0731   • lidocaine (XYLOCAINE) 1 % injection 5 mL  5 mL Infiltration Once Kay Car MD       • melatonin tablet 5 mg  5 mg Oral Nightly PRN Yordan Hackett MD       • metoprolol tartrate (LOPRESSOR) half tablet 37.5 mg  37.5 mg Oral BID Yordan Hackett MD   37.5 mg at 03/16/22 1239    • midodrine (PROAMATINE) tablet 10 mg  10 mg Oral TID Yordan Kendall MD   10 mg at 03/16/22 1641   • neomycin-bacitracin-polymyxin (NEOSPORIN) ointment 1 application  1 application Topical Nightly Yordan Hackett MD   1 application at 03/15/22 2147   • nitroglycerin (NITROSTAT) SL tablet 0.4 mg  0.4 mg Sublingual Q5 Min PRN Yordan Hackett MD       • ondansetron (ZOFRAN) injection 4 mg  4 mg Intravenous Q6H PRN Yordan Hackett MD       • ondansetron (ZOFRAN) tablet 4 mg  4 mg Oral Q8H PRN Yordan Hackett MD       • pantoprazole (PROTONIX) EC tablet 40 mg  40 mg Oral QAM Yordan Kendall MD   40 mg at 03/16/22 0656   • primidone (MYSOLINE) tablet 25 mg  25 mg Oral Daily Yordan Hackett MD   25 mg at 03/16/22 1237   • sertraline (ZOLOFT) tablet 50 mg  50 mg Oral Daily Yordan Hackett MD   50 mg at 03/16/22 1236   • sodium chloride 0.9 % flush 10 mL  10 mL Intravenous PRN Leona Knowles APRN       • sodium chloride 0.9 % flush 10 mL  10 mL Intravenous Q12H Yordan Hackett MD   10 mL at 03/16/22 1240   • sodium chloride 0.9 % flush 10 mL  10 mL Intravenous PRN Yordan Hackett MD       • sodium chloride 0.9 % flush 10 mL  10 mL Intravenous Q12H Keara Cardozo, APRN       • sodium chloride 0.9 % flush 10 mL  10 mL Intravenous PRN Keara Cardozo, APRN       • vitamin B-12 (CYANOCOBALAMIN) tablet 1,000 mcg  1,000 mcg Oral Daily Yordan Hackett MD   1,000 mcg at 03/16/22 1237        ________________________________________________________        OBJECTIVE:    PHYSICAL EXAM:    Constitutional: The patient is in no apparent distress, he was asleep but easily awakes to voice. There is no shortness of breath.   PSYCHIATRIC: Appropriate  HEENT:Normocephalic, atraumatic.   Chest: Breathing unlabored  Cardiac: Regular rate and rhythm.   Extremities:  Edema in b/l arms and legs     NEUROLOGICAL:    Cognition:   Oriented to name, hospital  Encephalopathic   Fund of knowledge very limited.    Cranial nerves;    II - pupils bilaterally equal reacting to  "light,  Pt unable to follow commands to check extraoccular eye movements, unable to clearly tell me what visional deficits he had. He does state that he can see me standing in front of him but then he says \"I can't see anything.\" He does not respond to visual threats bilaterally.   Fundoscopic exam- Not able to be done, non-dilated exam  III,IV,VI: Difficulty keeping right eye open   V: Normal facial sensations  VII: Left facial asymmetry   VIII: No New hearing abnormality  IX, X, XI: normal gag and shoulder shrug;  XII: tongue is in the midline.    Sensory:  Intact to light touch in all extremities.     Motor: Strength 5-/5 bilaterally upper and 4/5 lower extremities. No involuntary movements present. Normal tone and bulk.    Cerebellar: Unable to test finger to nose movements  Gait and balance: Deferred.     Physical exam performed by COMPA Lantigua.  ________________________________________________________   RESULTS REVIEW:    VITAL SIGNS:   Temp:  [97.4 °F (36.3 °C)] 97.4 °F (36.3 °C)  Heart Rate:  [] 97  Resp:  [12-18] 18  BP: ()/(35-81) 112/63     LABS:      Lab 03/16/22 2118 03/16/22 0107 03/13/22 2312 03/12/22 2023   WBC  --  6.60 4.40 7.20   HEMOGLOBIN  --  10.4* 10.0* 10.5*   HEMATOCRIT  --  33.0* 32.1* 32.9*   PLATELETS  --  147 190 177   NEUTROS ABS  --   --  3.60 4.70   LYMPHS ABS  --   --  0.30* 1.30   MONOS ABS  --   --  0.50 1.00*   EOS ABS  --   --  0.00 0.10   MCV  --  95.1 94.8 95.5   PROTIME 11.4  --   --   --    APTT 22.2*  --   --   --          Lab 03/16/22 0107 03/13/22 2312 03/13/22 0408 03/12/22 2102   SODIUM 127* 131* 132* 132*   POTASSIUM 4.6 5.4* 5.2 4.7   CHLORIDE 93* 95* 95* 97*   CO2 22.0 23.0 22.0 24.0   ANION GAP 12.0 13.0 15.0 11.0   BUN 36* 40* 30* 24*   CREATININE 3.73* 4.01* 3.64* 3.62*   EGFR 16.6* 15.2* 17.1* 17.2*   GLUCOSE 126* 208* 171* 126*   CALCIUM 8.5* 8.3* 8.6 8.7   PHOSPHORUS  --   --   --  3.9   HEMOGLOBIN A1C  --   --   --  6.3*   TSH  --   -- "   --  8.740*         Lab 03/12/22 2102   TOTAL PROTEIN 6.0   ALBUMIN 3.40*   GLOBULIN 2.6   ALT (SGPT) 8   AST (SGOT) 12   BILIRUBIN 0.6   ALK PHOS 55         Lab 03/16/22 2118 03/12/22 2102   PROBNP  --  >70,000.0*   TROPONIN T  --  0.146*   PROTIME 11.4  --    INR 1.03  --          Lab 03/16/22 2118   CHOLESTEROL 129   LDL CHOL 55   HDL CHOL 56   TRIGLYCERIDES 97             UA    Urinalysis 7/28/21 7/28/21 2/24/22 2/24/22    1703 1703 1043 1043   Squamous Epithelial Cells, UA  0-2  0-2   Specific Gravity, UA 1.007  1.021    Ketones, UA Negative  Trace (A)    Blood, UA Negative  Small (1+) (A)    Leukocytes, UA Large (3+) (A)  Large (3+) (A)    Nitrite, UA Negative  Positive (A)    RBC, UA  0-2 (A)  0-2 (A)   WBC, UA  Too Numerous to Count (A)  6-12 (A)   Bacteria, UA  Trace (A)  1+ (A)   (A) Abnormal value              Lab Results   Component Value Date    TSH 8.740 (H) 03/12/2022    LDL 55 03/16/2022    HGBA1C 6.3 (H) 03/12/2022    [id number] 1,016 (H) 05/06/2021       IMAGING STUDIES:  CT Head Without Contrast    Result Date: 3/16/2022   1. Approximate 5 x 3 cm wedge-shaped hypodense region in the right occipital lobe concerning for an acute right PCA territory infarct. This is a new finding since 03/14/2022. No acute hemorrhages, mass effect or midline shift. 2. Otherwise no change in generalized parenchymal volume loss evidence of an old left infarct and chronic microvascular ischemia. Findings were called to Ashok, the patient's nurse on 2A at 8:38 PM on 03/16/2022.  Electronically Signed By-Leonard Wilcox DO On:3/16/2022 8:44 PM This report was finalized on [id number] by  Leonard Wilcox DO.      I reviewed the patient's new clinical results.    ________________________________________________________     PROBLEM LIST:    Fluid overload    Major depressive disorder, recurrent, mild (HCC)    Unspecified dementia with behavioral disturbance (HCC)    Coronary artery disease    Vitamin D deficiency     COPD with acute exacerbation (HCC)    Type 2 diabetes mellitus with hyperglycemia (HCC)    End stage renal disease (HCC)    Congestive heart failure, unspecified HF chronicity, unspecified heart failure type (HCC)    Chronic respiratory failure with hypoxia, on home oxygen therapy (HCC)    Obesity (BMI 30-39.9)          Assessment/Plan   ASSESSMENT/PLAN:  1. Acute right occipital lobe stroke within the PCA territory.  Occipital lobe stroke likely second to cardioembolic source (atrial fibrillation).   - MRI brain: .Evidence for acute infarct within the right occipital lobe which has occurred since prior study. There is some early hemorrhagic transformation suggested.  - MRA head without contrast: Pending  - Carotid duplex pending   - Echo: EF is 26 to 30%,  - EKG: Atrial fibrillation, rate 84   - Labs: A1C: 6.3, B12: , LDL: 55, TSH: 8.740  - Antithrombotics: Would recommend to hold Eliquis for 7 to 10 days due to size of stroke and risk of hemorrhagic conversion.  Full dose Eliquis recommended for patient for stroke prevention.   - Statin: Lipitor 40  - PT/OT/ST as appropriate, Neuro checks per protocol, DVT prophylaxis, Stroke education  -Please call neurology team with any changes in neurologic status or severe headache.     2.  Acute on chronic hypoxic respiratory failure  -Improving, management per primary    3.  Acute on chronic systolic congestive heart failure  -Dialysis per renal  -Cardiology following    4.  End-stage renal disease on hemodialysis  -Hemodialysis per renal    5.  Atrial fibrillation, chronic  -Cardiology following, once safe to resume Eliquis would recommend full dose since patient does not meet the criteria for half dosing.     6.  Type 2 Diabetes Mellitus  - A1C: 6.2  - Strict glycemic control, SSI, diabetic diet, diabetes educator    7. Modification of stroke risk factors:   - Blood pressure should be less than 130/80 outpatient, HbA1c less than 6.5, LDL less than 70; b12>500 and smoking  cessation if applicable. We would be grateful if the primary team / primary care physician would keep a close watch on the above targets.  - Stroke education  - Follow up outpatient with Neurology     Will follow.         FABIOLA Rivera  03/17/22  09:56 EDT
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
## 2022-03-17 NOTE — PROGRESS NOTES
[facility] Medicine Services Daily Progress Note    Patient Name: Benson Mclean  : 1950  MRN: [id number]  Primary Care Physician:  Rudolph Rooney MD  Date of admission: 3/12/2022      Subjective      Chief Complaint: Shortness of breath with wheezing      Patient Reports   3/15/2022:   Patient reports feeling fine with no specific complaints.  3/16/2022:  I spoke with the bedside nurse. The patient went to dialysis this morning and then later had an episode of feeling like his vision went black when he was working with physical therapy in an upright position.  The symptoms resolved when he was placed back in bed.  Orthostatic vitals were checked and he did not drop with changing from lying to sitting position.  When I subsequently saw the patient he complained of persistent vision loss.  He describes it as seeing shadows and is having trouble identifying what is on the TV or what is on his dinner plate.  CT scan of the head was ordered.  He denies any weakness on one side of his body or difficulty with speech or swallowing.  3/17/2022:  Patient was sleeping at the time of my visit.    ROS   12 point review of systems negative including no complaints of chest pain or lightheadedness, swelling or shortness of breath      Objective      Vitals:   Temp:  [97.4 °F (36.3 °C)-97.8 °F (36.6 °C)] 97.8 °F (36.6 °C)  Heart Rate:  [] 85  Resp:  [8-18] 8  BP: (100-134)/(35-80) 116/62  Flow (L/min):  [3] 3    Physical Exam   Vital signs and nurses notes reviewed.  Morbidly obese gentleman lying in bed sleeping in no acute distress; dialysis catheter right anterior chest; lungs clear to auscultation with decreased air entry bilaterally; CV regular rate and rhythm; abdomen soft, nondistended; extremities with AV fistula left arm; no lower extremity edema or cyanosis.      Result Review    Result Review:  I have personally reviewed the results from the time of this admission to 3/17/2022  16:05 EDT and agree with these findings:  [x]  Laboratory  [x]  Microbiology  [x]  Radiology  [x]  EKG/Telemetry   [x]  Cardiology/Vascular   []  Pathology  []  Old records  []  Other:  Most notable findings discussed in the assessment and plan.    Wounds (last 24 hours)     LDA Wound     Row Name 03/17/22 0800 03/16/22 1914          Wound 03/07/22 1806 Left anterior thigh Burn    Wound - Properties Group Placement Date: 03/07/22  -MG Placement Time: 1806  -MG Side: Left  -MG Orientation: anterior  -MG Location: thigh  -MG Primary Wound Type: Burn  -KK     Dressing Appearance -- open to air  -ED     Edges -- irregular  -ED     Drainage Characteristics/Odor -- serosanguineous  -ED     Drainage Amount -- scant  -ED     Retired Wound - Properties Group Placement Date: 03/07/22  -MG Placement Time: 1806  -MG Side: Left  -MG Orientation: anterior  -MG Location: thigh  -MG Primary Wound Type: Burn  -KK     Retired Wound - Properties Group Date first assessed: 03/07/22  -MG Time first assessed: 1806  -MG Side: Left  -MG Location: thigh  -MG Primary Wound Type: Burn  -KK            Wound 03/07/22 1805 Right anterior thigh Burn    Wound - Properties Group Placement Date: 03/07/22  -MG Placement Time: 1805  -MG Present on Hospital Admission: Y  -MG Side: Right  -MG Orientation: anterior  -MG Location: thigh  -MG Primary Wound Type: Burn  -KK     Dressing Appearance open to air  -HR open to air  -ED     Edges -- irregular  -ED     Drainage Characteristics/Odor -- serosanguineous  -ED     Drainage Amount -- scant  -ED     Dressing Care -- open to air  -ED     Retired Wound - Properties Group Placement Date: 03/07/22  -MG Placement Time: 1805  -MG Present on Hospital Admission: Y  -MG Side: Right  -MG Orientation: anterior  -MG Location: thigh  -MG Primary Wound Type: Burn  -KK     Retired Wound - Properties Group Date first assessed: 03/07/22  -MG Time first assessed: 1805  -MG Present on Hospital Admission: Y  -MG Side: Right   -MG Location: thigh  -MG Primary Wound Type: Burn  -KK           User Key  (r) = Recorded By, (t) = Taken By, (c) = Cosigned By    Initials Name Provider Type    Ashia Castaneda, RN Registered Nurse    Radha Quispe RN Registered Nurse    Mary Jo Martino RN Registered Nurse    Ebony Davis RN Registered Nurse                  Assessment/Plan      Brief Patient Summary:  Benson Mclean is a 71 y.o. male with a history of end-stage renal disease, chronic systolic congestive heart failure due to ischemic cardiomyopathy, CAD status post CABG and stents, chronic atrial fibrillation, CVA with residual right-sided weakness, and chronic hypoxic respiratory failure due to COPD who presented to the emergency room from [facility] where he was noted to be having trouble breathing.  proBNP was greater than 70,000.  The patient received Solu-Medrol, IV Lasix and duo nebs in the emergency room and was improved.  Nephrology was consulted and hemodialysis was ordered.    Patient had gradual improvement in his labs.  His swelling and his urine output improved during the course of the hospital stay.  He is medically ready for discharge and is awaiting precertification for inpatient rehab.    Current inpatient medications include:  aspirin, 81 mg, Oral, Daily  atorvastatin, 40 mg, Oral, Daily  cholecalciferol, 1,000 Units, Oral, Daily  docusate sodium, 100 mg, Oral, Daily  donepezil, 10 mg, Oral, Nightly  gabapentin, 100 mg, Oral, BID  guaiFENesin, 1,200 mg, Oral, Q12H  HYDROcodone-acetaminophen, 1 tablet, Oral, BID  insulin lispro, 0-9 Units, Subcutaneous, TID AC  insulin NPH-insulin regular, 10 Units, Subcutaneous, Daily With Breakfast  ipratropium-albuterol, 3 mL, Nebulization, TID - RT  lidocaine, 5 mL, Infiltration, Once  metoprolol tartrate, 37.5 mg, Oral, BID  midodrine, 10 mg, Oral, TID AC  neomycin-bacitracin-polymyxin, 1 application, Topical, Nightly  pantoprazole, 40 mg, Oral, QAM  AC  primidone, 25 mg, Oral, Daily  sertraline, 50 mg, Oral, Daily  sodium chloride, 10 mL, Intravenous, Q12H  sodium chloride, 10 mL, Intravenous, Q12H  vitamin B-12, 1,000 mcg, Oral, Daily             Active Hospital Problems:  Active Hospital Problems    Diagnosis    • **Fluid overload    • Chronic respiratory failure with hypoxia, on home oxygen therapy (HCC)    • Obesity (BMI 30-39.9)    • Congestive heart failure, unspecified HF chronicity, unspecified heart failure type ([facility])    • End stage renal disease ([facility])    • Type 2 diabetes mellitus with hyperglycemia ([facility])    • COPD with acute exacerbation ([facility])    • Major depressive disorder, recurrent, mild ([facility])    • Unspecified dementia with behavioral disturbance ([facility])    • Coronary artery disease    • Vitamin D deficiency      Plan:     Acute vision loss, new problem on 3/16/2022  Right occipital lobe ischemic CVA in right PCA territory  Old ischemic CVA left occipital lobe  -CT head showed a 5 x 3 cm wedge-shaped hypodense region in the right occipital lobe which was new compared to CT 3/14/2022.  No hemorrhages, mass-effect, or midline shift.  -Patient was already on apixaban and aspirin was added but subsequently both were discontinued  -MRI brain showed a stroke in the right occipital lobe with some hemorrhagic transformation  -Continue atorvastatin  -Neurology consulting    Acute on chronic hypoxic respiratory failure multifactorial due to fluid overload due to missed dialysis in end-stage renal disease patient, acute on chronic CHF and COPD exacerbation    Acute exacerbation of COPD, mild  -Continue DuoNebs, guaifenesin, inhaled steroids     Acute on chronic systolic congestive heart failure secondary to ischemic cardiomyopathy and pulmonary hypertension  -Dialysis ordered for fluid removal    CAD status post CABG and stents  Hyperlipidemia  Chronic atrial fibrillation  Hypertension of CKD  -Continue home statin and metoprolol  -No anticoagulation secondary  to history of brain bleed    End-stage renal disease on hemodialysis  -Nephrology consulting    Anemia of CKD  -Hemoglobin currently stable at 10.0    Insulin-dependent diabetes mellitus with diabetic peripheral neuropathy  -Hemoglobin A1c 6.3 on 3/12/2022  -Continue home insulin and gabapentin  -SSI    Obstructive sleep apnea  -Encourage CPAP compliance    GERD  -Continue PPI    Morbid obesity  -Risk factor modification counseling    Dementia with behavioral disturbance  -Continue donepezil and sertraline    Tremor  -Continue primidone    Vitamin B12 and vitamin D deficiency  -Continue supplements    DVT prophylaxis:  Medical and mechanical DVT prophylaxis orders are present.    CODE STATUS:    Level Of Support Discussed With: Patient  Code Status (Patient has no pulse and is not breathing): CPR (Attempt to Resuscitate)  Medical Interventions (Patient has pulse or is breathing): Full Support      Disposition:  I expect patient to be discharged once inpatient rehab has been arranged and pre-CERT approved.    This patient has been examined wearing appropriate Personal Protective Equipment and discussed with hospital infection control department. 03/17/22      Electronically signed by Kay Car MD, 03/17/22, 16:05 EDT.  [facility] Hospitalist Team

## 2022-03-17 NOTE — CONSULTS
PICC team note,  Right upper arm evaluated for midline placement. Large 15cm x 15cm infiltrate extending from below the AC space to approximately 5 cm distal to right axilla. Arm circ is 41 cm. Lower arm is swollen and bruised. No viable sites to place IV. Would reevaluate when infiltrate resolves.

## 2022-03-17 NOTE — PLAN OF CARE
Goal Outcome Evaluation:  Plan of Care Reviewed With: patient        Progress: no change  Outcome Evaluation: Bedside swallow evaluation completed in setting of new CVA, L facial droop. Pt remains on regular diet, AM breakfast tray present with omelet, thin soda by straw, thin coffee by cup. No swallow screen documented. L sided facial droop present from prior CVA, limited lingual ROM. Natural dentition, some missing. Pt with slow mastication of omelet, self requests/utilizes a thin wash every bite to clear minimal lingual residuals. No overt s/s aspiration throughout. Palpation appears age appropriate.     Recommend: regular and thin liquid diet. Medications: with thin liquids. Compensations: small bites/sips, upright for all PO, full feed assist d/t vision loss and baseline difficulties self feeding.     May follow up x1 additional session to ensure carryover of safe swallow strategies through meals.   Brief cognitive linguistic screener through informal means. Difficult to ascertain new difficulties given premorbid CVA and deficits. Pt states at baseline he is unable to read books (preferred hobby) but is able to visually recognize things. This date, pt is only able to see number of fingers within 1 foot distance in 2/4 opportunities. Discussed visual strategies such as putting tactile tape on call button to aid use.  Memory of unrelated words with 5 minute delay 0/3. Following directions: 100% I'ly with intermittent repetitions. Word finding in conversation with x8 anomic events, can repair with SLP cueing. Biographical information with 75% accuracy, decreased memory for address, anniversary, information regarding wife of 20 years. Problem solving for safety “What would you do if” with 7/8, 88% accuracy.     Goals written targeting visual ID pending improvements s/p occipital CVA, word finding, and functional memory. Again, suspect multiple deficits premorbid.

## 2022-03-17 NOTE — PROGRESS NOTES
"RENAL/KCC:     LOS: 2 days    Patient Care Team:  Rudolph Rooney MD as PCP - General (Family Medicine)  Samantha Zabala APRN as PCP - Family Medicine  Jose G Rm MD as Consulting Physician (Cardiology)    Chief Complaint:  SOA    Subjective     Interval History:   Events noted, had vision issue yesterday and scan revealed a new occipital CVA.    Objective     Vital Sign Min/Max for last 24 hours  Temp  Min: 97.4 °F (36.3 °C)  Max: 97.4 °F (36.3 °C)   BP  Min: 95/69  Max: 140/81   Pulse  Min: 75  Max: 106   Resp  Min: 12  Max: 18   SpO2  Min: 98 %  Max: 100 %   Flow (L/min)  Min: 3  Max: 3   No data recorded     Flowsheet Rows    Flowsheet Row First Filed Value   Admission Height 177.8 cm (70\") Documented at 03/12/2022 1910   Admission Weight 113 kg (250 lb) Documented at 03/12/2022 1910          No intake/output data recorded.  I/O last 3 completed shifts:  In: 720 [P.O.:720]  Out: 3000 [Other:3000]    Physical Exam:  GEN: Awake, NAD  ENT: PERRL, EOMI, MMM  NECK: Supple, no JVD  CHEST: CTAB, no W/R/C  CV: RRR, no M/G/R  ABD: Soft, NT, +BS  SKIN: Warm and Dry  NEURO: CN's intact      WBC WBC   Date Value Ref Range Status   03/16/2022 6.60 3.40 - 10.80 10*3/mm3 Final      HGB Hemoglobin   Date Value Ref Range Status   03/16/2022 10.4 (L) 13.0 - 17.7 g/dL Final      HCT Hematocrit   Date Value Ref Range Status   03/16/2022 33.0 (L) 37.5 - 51.0 % Final      Platlets No results found for: LABPLAT   MCV MCV   Date Value Ref Range Status   03/16/2022 95.1 79.0 - 97.0 fL Final          Sodium Sodium   Date Value Ref Range Status   03/16/2022 127 (L) 136 - 145 mmol/L Final      Potassium Potassium   Date Value Ref Range Status   03/16/2022 4.6 3.5 - 5.2 mmol/L Final      Chloride Chloride   Date Value Ref Range Status   03/16/2022 93 (L) 98 - 107 mmol/L Final      CO2 CO2   Date Value Ref Range Status   03/16/2022 22.0 22.0 - 29.0 mmol/L Final      BUN BUN   Date Value Ref Range Status   03/16/2022 36 (H) 8 - 23 " mg/dL Final      Creatinine Creatinine   Date Value Ref Range Status   03/16/2022 3.73 (H) 0.76 - 1.27 mg/dL Final      Calcium Calcium   Date Value Ref Range Status   03/16/2022 8.5 (L) 8.6 - 10.5 mg/dL Final      PO4 No results found for: CAPO4   Albumin No results found for: ALBUMIN   Magnesium No results found for: MG   Uric Acid No results found for: URICACID        Results Review:     I reviewed the patient's new clinical results.    aspirin, 81 mg, Oral, Daily  atorvastatin, 40 mg, Oral, Daily  cholecalciferol, 1,000 Units, Oral, Daily  docusate sodium, 100 mg, Oral, Daily  donepezil, 10 mg, Oral, Nightly  gabapentin, 100 mg, Oral, BID  guaiFENesin, 1,200 mg, Oral, Q12H  HYDROcodone-acetaminophen, 1 tablet, Oral, BID  insulin lispro, 0-9 Units, Subcutaneous, TID AC  insulin NPH-insulin regular, 10 Units, Subcutaneous, Daily With Breakfast  ipratropium-albuterol, 3 mL, Nebulization, TID - RT  lidocaine, 5 mL, Infiltration, Once  metoprolol tartrate, 37.5 mg, Oral, BID  midodrine, 10 mg, Oral, TID AC  neomycin-bacitracin-polymyxin, 1 application, Topical, Nightly  pantoprazole, 40 mg, Oral, QAM AC  primidone, 25 mg, Oral, Daily  sertraline, 50 mg, Oral, Daily  sodium chloride, 10 mL, Intravenous, Q12H  sodium chloride, 10 mL, Intravenous, Q12H  vitamin B-12, 1,000 mcg, Oral, Daily           Medication Review: Reviewed    Assessment/Plan     1) ESRD  2) Chronic hypotension  3) COPD exacerbation  4) CHF  5) Anemia of CKD    Plan: HD MWF.  BP stable.  SOA improved.  Neuro consulted for CVA.  Will follow.        Yony Bhat MD   [facility]  03/17/22  10:26 EDT

## 2022-03-17 NOTE — CONSULTS
Nutrition Services    Patient Name:  Benson Mclean  YOB: 1950  MRN: [id number]  Admit Date:  3/12/2022    Nutrition education consult for BMI 30-39.9 received.  Visited patient at bedside- pt being fed by sitter and does not interact with RD.  Patient is from facility with standard meal offerings.    RD will follow per protocol.    Electronically signed by:  Tonia Wheeler RD  03/17/22 15:56 EDT

## 2022-03-17 NOTE — THERAPY TREATMENT NOTE
Subjective: Pt agreeable to therapeutic plan of care.  Cognition: oriented to Person and Place    Objective:     Bed Mobility: Max-A and Assist x 2  Functional Transfers: Max-A and Assist x 2  Functional Ambulation: N/A or Not attempted.    Toileting: Max-A and Assist x 2  ADL Position: supine  ADL Comments: Patient had a bowel accident during treatment, requiring max assist x2 for cleanup. He assisted by reaching for bedrails to then roll side to side, however was unable to assist further. Patient was cleaned, new linens and brief were applied.      Pain: 8 VAS  Education: Provided education on importance of mobility and skilled verbal / tactile cueing throughout intervention.     Assessment: Benson Mclean presents with ADL impairments below baseline abilities which indicate the need for continued skilled intervention while inpatient. He seems to have further declined since evaluation, now requiring max assist of 2 for bed mobility and attempt to stand. He was unable to extend hips and knees in order to fully stand at the side of bed, attempted twice. He required mod assist to sit EOB between attempts. Tolerating session today without incident. Will continue to follow and progress as tolerated.     Plan/Recommendations:   Pt would benefit from Skilled Rehab placement at discharge from facility.   Pt desires Skilled Rehab placement at discharge. Pt cooperative; agreeable to therapeutic recommendations and plan of care.     Modified Fredy: 5 = Severe disability (Requires constant nursing care and attention, bedridden, incontinent)    Post-Tx Position: Supine with HOB Elevated, Alarms activated and Call light and personal items within reach  PPE: gloves, surgical mask, eyewear protection

## 2022-03-17 NOTE — PLAN OF CARE
Goal Outcome Evaluation:     Patient is alert and oriented x3. Patient NIH 7. Patient still having visual loss. Patient scheduled to have hemodialysis tomorrow. Patient remains on falls risk precautions.       Problem: Adult Inpatient Plan of Care  Goal: Plan of Care Review  Outcome: Ongoing, Progressing  Goal: Patient-Specific Goal (Individualized)  Outcome: Ongoing, Progressing  Goal: Absence of Hospital-Acquired Illness or Injury  Outcome: Ongoing, Progressing  Intervention: Identify and Manage Fall Risk  Recent Flowsheet Documentation  Taken 3/17/2022 1400 by Mary Jo Garnica RN  Safety Promotion/Fall Prevention:   activity supervised   assistive device/personal items within reach   clutter free environment maintained   safety round/check completed   room organization consistent   nonskid shoes/slippers when out of bed   lighting adjusted  Taken 3/17/2022 1200 by Mary Jo Garnica RN  Safety Promotion/Fall Prevention:   activity supervised   assistive device/personal items within reach   room organization consistent   safety round/check completed   nonskid shoes/slippers when out of bed   lighting adjusted   clutter free environment maintained  Taken 3/17/2022 1000 by Mary Jo Garnica RN  Safety Promotion/Fall Prevention:   activity supervised   assistive device/personal items within reach   clutter free environment maintained   safety round/check completed   room organization consistent   nonskid shoes/slippers when out of bed   lighting adjusted  Taken 3/17/2022 0800 by Mary Jo Garnica RN  Safety Promotion/Fall Prevention:   activity supervised   assistive device/personal items within reach   clutter free environment maintained   safety round/check completed   room organization consistent   nonskid shoes/slippers when out of bed   lighting adjusted  Intervention: Prevent Skin Injury  Recent Flowsheet Documentation  Taken 3/17/2022 1200 by Mary Jo Garnica RN  Skin Protection: adhesive use limited  Taken  3/17/2022 0800 by Mary Jo Garnica RN  Skin Protection: adhesive use limited  Intervention: Prevent Infection  Recent Flowsheet Documentation  Taken 3/17/2022 1400 by Mary Jo Garnica RN  Infection Prevention:   single patient room provided   rest/sleep promoted   personal protective equipment utilized   hand hygiene promoted   equipment surfaces disinfected  Taken 3/17/2022 1200 by Mary Jo Garnica RN  Infection Prevention:   single patient room provided   rest/sleep promoted   personal protective equipment utilized   hand hygiene promoted   equipment surfaces disinfected  Taken 3/17/2022 1000 by Mary Jo Garnica RN  Infection Prevention:   single patient room provided   rest/sleep promoted   hand hygiene promoted   personal protective equipment utilized   equipment surfaces disinfected  Taken 3/17/2022 0800 by Mary Jo Garnica RN  Infection Prevention:   single patient room provided   rest/sleep promoted   personal protective equipment utilized   hand hygiene promoted   equipment surfaces disinfected  Goal: Optimal Comfort and Wellbeing  Outcome: Ongoing, Progressing  Intervention: Provide Person-Centered Care  Recent Flowsheet Documentation  Taken 3/17/2022 1200 by Mary Jo Garnica RN  Trust Relationship/Rapport:   care explained   choices provided  Taken 3/17/2022 0800 by Mary Jo Garnica RN  Trust Relationship/Rapport:   care explained   choices provided  Goal: Readiness for Transition of Care  Outcome: Ongoing, Progressing     Problem: Fall Injury Risk  Goal: Absence of Fall and Fall-Related Injury  Outcome: Ongoing, Progressing  Intervention: Identify and Manage Contributors  Recent Flowsheet Documentation  Taken 3/17/2022 1400 by Mary Jo Garnica RN  Medication Review/Management: medications reviewed  Taken 3/17/2022 1200 by Mary Jo Garnica RN  Medication Review/Management: medications reviewed  Taken 3/17/2022 1000 by Mary Jo Garnica RN  Medication Review/Management: medications reviewed  Taken  3/17/2022 0800 by Mary Jo Garnica RN  Medication Review/Management: medications reviewed  Intervention: Promote Injury-Free Environment  Recent Flowsheet Documentation  Taken 3/17/2022 1400 by Mary Jo Garnica RN  Safety Promotion/Fall Prevention:   activity supervised   assistive device/personal items within reach   clutter free environment maintained   safety round/check completed   room organization consistent   nonskid shoes/slippers when out of bed   lighting adjusted  Taken 3/17/2022 1200 by Mary Jo Garnica RN  Safety Promotion/Fall Prevention:   activity supervised   assistive device/personal items within reach   room organization consistent   safety round/check completed   nonskid shoes/slippers when out of bed   lighting adjusted   clutter free environment maintained  Taken 3/17/2022 1000 by Mary Jo Garnica RN  Safety Promotion/Fall Prevention:   activity supervised   assistive device/personal items within reach   clutter free environment maintained   safety round/check completed   room organization consistent   nonskid shoes/slippers when out of bed   lighting adjusted  Taken 3/17/2022 0800 by Mary Jo Garnica RN  Safety Promotion/Fall Prevention:   activity supervised   assistive device/personal items within reach   clutter free environment maintained   safety round/check completed   room organization consistent   nonskid shoes/slippers when out of bed   lighting adjusted     Problem: Skin Injury Risk Increased  Goal: Skin Health and Integrity  Outcome: Ongoing, Progressing  Intervention: Optimize Skin Protection  Recent Flowsheet Documentation  Taken 3/17/2022 1200 by Mary Jo Garnica RN  Pressure Reduction Techniques: weight shift assistance provided  Pressure Reduction Devices: pressure-redistributing mattress utilized  Skin Protection: adhesive use limited  Taken 3/17/2022 0800 by Mary Jo Garnica RN  Pressure Reduction Techniques: weight shift assistance provided  Pressure Reduction  Devices: pressure-redistributing mattress utilized  Skin Protection: adhesive use limited

## 2022-03-17 NOTE — PROGRESS NOTES
Referring Provider: Kay Car MD    Reason for follow-up:  Congestive heart failure  Status post CABG  Status post stent  Visual disturbance  Possible occipital stroke.     Patient Care Team:  Rudolph Rooney MD as PCP - General (Family Medicine)  Samantha Zabala APRN as PCP - Family Medicine  Jose G Rm MD as Consulting Physician (Cardiology)    Subjective .  Feeling okay     ROS    The patient has been without any chest discomfort ,shortness of breath, palpitations, dizziness or syncope.  Denies having any headache ,abdominal pain ,nausea, vomiting , diarrhea constipation, loss of weight or loss of appetite.  Denies having any excessive bruising ,hematuria or blood in the stool.    Review of all systems negative except as indicated    History  Past Medical History:   Diagnosis Date   • A-fib ([facility]) 8/3/2021   • Anemia    • Appetite loss    • Arthritis    • Brain bleed ([facility])    • Carpal tunnel syndrome on left    • CHF (congestive heart failure) ([facility])    • Chronic left-sided low back pain without sciatica 12/27/2017   • CKD (chronic kidney disease) stage 3, GFR 30-59 ml/min ([facility])    • Closed fracture of seventh thoracic vertebra ([facility]) 8/23/2020   • Cognitive communication deficit 12/1/2020   • COPD (chronic obstructive pulmonary disease) ([facility])    • Coronary artery disease    • COVID-19 2/19/2021   • Cytokine release syndrome, grade 1 5/24/2021   • Depression    • Diabetic neuropathy ([facility])    • Dialysis patient ([facility])     mon wed fri   • DM2 (diabetes mellitus, type 2) ([facility])    • GERD (gastroesophageal reflux disease)    • Hyperlipidemia    • Hypertension    • Hypogonadism in male    • Neuropathy    • Nonsustained ventricular tachycardia ([facility]) 7/23/2021   • Obesity    • Paroxysmal atrial fibrillation ([facility]) 12/1/2020   • Sleep apnea    • Stroke ([facility])    • Unsteady gait        Past Surgical History:   Procedure Laterality Date   • ANGIOPLASTY      X2   • APPENDECTOMY     • ARTERIOVENOUS FISTULA/SHUNT SURGERY  Left 1/20/2022    Procedure: LEFT BRACHIAL CEPHALIC ARTERIAL VENOUS FISTULA CREATION;  Surgeon: Yony Davila MD;  Location: [facility] MAIN OR;  Service: Vascular;  Laterality: Left;   • CARDIAC CATHETERIZATION  11/13/2015   • CARDIAC CATHETERIZATION N/A 5/24/2021    Procedure: Left Heart Cath and coronary angiogram;  Surgeon: Jose G Rm MD;  Location:  [facility] CATH INVASIVE [facility]ATION;  Service: Cardiovascular;  Laterality: N/A;   • CARDIAC CATHETERIZATION  5/24/2021    Procedure: Saphenous Vein Graft;  Surgeon: Jose G Rm MD;  Location:  [facility]VASIVE [facility];  Service: Cardiovascular;;   • CARDIAC CATHETERIZATION N/A 5/24/2021    Procedure: Percutaneous Coronary Intervention;  Surgeon: Bernabe Zambrano MD;  Location:  [facility]VASIVE [facility];  Service: Cardiology;  Laterality: N/A;   • CARDIAC CATHETERIZATION N/A 5/24/2021    Procedure: Stent NIELS coronary;  Surgeon: Bernabe Zambrano MD;  Location:  [facility];  Service: Cardiology;  Laterality: N/A;   • CARDIAC CATHETERIZATION N/A 5/26/2021    Procedure: Percutaneous Coronary Intervention;  Surgeon: Bernabe Zambrano MD;  Location:  [facility];  Service: Cardiovascular;  Laterality: N/A;   • CARDIAC CATHETERIZATION N/A 5/26/2021    Procedure: Stent NIELS bypass graft;  Surgeon: Bernabe Zambrano MD;  Location:  [facility];  Service: Cardiovascular;  Laterality: N/A;   • CARDIAC ELECTROPHYSIOLOGY PROCEDURE N/A 5/24/2021    Procedure: Temporary Pacemaker;  Surgeon: Bernabe Zambrano MD;  Location:  [facility];  Service: Cardiology;  Laterality: N/A;   • CARDIAC ELECTROPHYSIOLOGY PROCEDURE N/A 5/26/2021    Procedure: Temporary Pacemaker;  Surgeon: Bernabe Zambrano MD;  Location:  [facility];  Service: Cardiovascular;  Laterality: N/A;   • CARPAL TUNNEL RELEASE Left 04/29/2018    carpal tunnel- lt hand// other hand surgeries    • CATARACT EXTRACTION, BILATERAL  2002     Dr. Lux Acosta   • CHOLECYSTECTOMY     • COLON RESECTION  2005   • CORONARY ANGIOPLASTY     • CORONARY ANGIOPLASTY WITH STENT PLACEMENT  11/13/2015    PTCA stent to proximal in stent and mid to distal lad   • CORONARY ANGIOPLASTY WITH STENT PLACEMENT  09/16/2016    PTCA stent to mid lad and stent to vein graft to marginal   • CORONARY ARTERY BYPASS GRAFT  2005    @ [facility]   • CYST REMOVAL      cyst removed from scrotum   • FOOT SURGERY Right 07/17/2018   • FOOT SURGERY Left 02/04/2019   • PORTACATH PLACEMENT     • TOE AMPUTATION Right     right toe removed D/T infected cut that went to the bone       Family History   Problem Relation Age of Onset   • Heart disease Mother    • Heart disease Father    • Diabetes Sister    • Heart disease Sister    • Diabetes Brother    • Mental illness Brother        Social History     Tobacco Use   • Smoking status: Former Smoker     Packs/day: 1.00     Years: 60.00     Pack years: 60.00     Types: Cigarettes   • Smokeless tobacco: Never Used   • Tobacco comment: Patient quit smoking 11/2020   Vaping Use   • Vaping Use: Never used   Substance Use Topics   • Alcohol use: No   • Drug use: Yes     Frequency: 1.0 times per week     Types: Marijuana     Comment: socially        Medications Prior to Admission   Medication Sig Dispense Refill Last Dose   • atorvastatin (LIPITOR) 40 MG tablet Take 40 mg by mouth Every Night.      • bisacodyl (DULCOLAX) 10 MG suppository Insert 10 mg into the rectum Daily As Needed for Constipation.      • cholecalciferol (VITAMIN D3) 25 MCG (1000 UT) tablet Take 1,000 Units by mouth Daily.      • docusate sodium (COLACE) 100 MG capsule Take 100 mg by mouth Daily.      • donepezil (ARICEPT) 10 MG tablet Take 10 mg by mouth Every Night.      • fluticasone (FLONASE) 50 MCG/ACT nasal spray 2 sprays by Each Nare route 2 (Two) Times a Day.      • gabapentin (NEURONTIN) 100 MG capsule Take 100 mg by mouth 2 (Two) Times a Day.      • Glucagon (Baqsimi One Pack) 3  MG/DOSE powder 3 mg into the nostril(s) as directed by provider Every 12 (Twelve) Hours As Needed.      • HYDROcodone-acetaminophen (NORCO) 7.5-325 MG per tablet Take 1 tablet by mouth Every 12 (Twelve) Hours As Needed.      • insulin NPH-insulin regular (humuLIN 70/30,novoLIN 70/30) (70-30) 100 UNIT/ML injection Inject 10 Units under the skin into the appropriate area as directed Daily.      • magnesium hydroxide (MILK OF MAGNESIA) 400 MG/5ML suspension Take 15 mL by mouth Daily As Needed for Constipation.      • melatonin 3 MG tablet Take 3 mg by mouth Every Night.      • Metoprolol Tartrate 37.5 MG tablet Take 37.5 mg by mouth 2 (Two) Times a Day. Hold for SBP <110 or HR < 60      • midodrine (PROAMATINE) 10 MG tablet Take 10 mg by mouth 3 (Three) Times a Day Before Meals. Hold for BP > 140/90      • Zdyyn-Cyfog-Kmmsrks-Pramoxine (Neosporin + Pain Relief Max St) 1 % ointment Apply 1 application topically Every Night. Apply to abrasions on right inner thigh and intact blister on left inner thigh.      • Nitroglycerin 400 MCG pack Place 400 mcg under the tongue Daily As Needed (Chest pain).      • omeprazole (priLOSEC) 20 MG capsule Take 20 mg by mouth Daily.      • ondansetron ODT (ZOFRAN-ODT) 4 MG disintegrating tablet Place 4 mg on the tongue Every 8 (Eight) Hours As Needed for Nausea or Vomiting.      • phenylephrine-mineral oil-petrolatum (Preparation H) 0.25-14-74.9 % ointment hemorrhoidal ointment Insert 1 application into the rectum Daily As Needed.      • primidone (MYSOLINE) 50 MG tablet Take 25 mg by mouth Daily.      • sertraline (ZOLOFT) 50 MG tablet Take 50 mg by mouth Daily.      • tiotropium (SPIRIVA) 18 MCG per inhalation capsule Place 1 capsule into inhaler and inhale Daily.      • vitamin B-12 (CYANOCOBALAMIN) 1000 MCG tablet Take 1,000 mcg by mouth Daily.          Allergies  Codeine    Scheduled Meds:aspirin, 81 mg, Oral, Daily  atorvastatin, 40 mg, Oral, Daily  cholecalciferol, 1,000 Units,  "Oral, Daily  docusate sodium, 100 mg, Oral, Daily  donepezil, 10 mg, Oral, Nightly  gabapentin, 100 mg, Oral, BID  guaiFENesin, 1,200 mg, Oral, Q12H  HYDROcodone-acetaminophen, 1 tablet, Oral, BID  insulin lispro, 0-9 Units, Subcutaneous, TID AC  insulin NPH-insulin regular, 10 Units, Subcutaneous, Daily With Breakfast  ipratropium-albuterol, 3 mL, Nebulization, TID - RT  metoprolol tartrate, 37.5 mg, Oral, BID  midodrine, 10 mg, Oral, TID AC  neomycin-bacitracin-polymyxin, 1 application, Topical, Nightly  pantoprazole, 40 mg, Oral, QAM AC  primidone, 25 mg, Oral, Daily  sertraline, 50 mg, Oral, Daily  sodium chloride, 10 mL, Intravenous, Q12H  sodium chloride, 10 mL, Intravenous, Q12H  vitamin B-12, 1,000 mcg, Oral, Daily      Continuous Infusions:   PRN Meds:.bisacodyl  •  dextrose  •  dextrose  •  glucagon (human recombinant)  •  heparin (porcine)  •  insulin lispro **AND** insulin lispro  •  ipratropium-albuterol  •  melatonin  •  nitroglycerin  •  ondansetron  •  ondansetron  •  [COMPLETED] Insert peripheral IV **AND** sodium chloride  •  sodium chloride  •  sodium chloride    Objective     VITAL SIGNS  Vitals:    03/16/22 2330 03/17/22 0000 03/17/22 0030 03/17/22 0100   BP: 100/57 114/63 (!) 101/35 112/63   BP Location:       Patient Position:       Pulse: 76 84 84 89   Resp:       Temp:       TempSrc:       SpO2:       Weight:       Height:           Flowsheet Rows    Flowsheet Row First Filed Value   Admission Height 177.8 cm (70\") Documented at 03/12/2022 1910   Admission Weight 113 kg (250 lb) Documented at 03/12/2022 1910            Intake/Output Summary (Last 24 hours) at 3/17/2022 0639  Last data filed at 3/16/2022 1152  Gross per 24 hour   Intake 240 ml   Output 3000 ml   Net -2760 ml        TELEMETRY: Atrial fibrillation    Physical Exam:  The patient is alert, oriented and in no distress.  Vital signs as noted above.  Exogenous obesity.  Head and neck revealed no carotid bruits or jugular venous " distention.  No thyromegaly or lymphadenopathy is present  Lungs clear.  No wheezing.  Breath sounds are normal bilaterally.  Heart normal first and second heart sounds.  No murmur. No precordial rub is present.  No gallop is present.  Abdomen soft and nontender.  No organomegaly is present.  Extremities with good peripheral pulses without any pedal edema.  Skin warm and dry.  Musculoskeletal system is grossly normal  CNS grossly normal      Results Review:   I reviewed the patient's new clinical results.  Lab Results (last 24 hours)     Procedure Component Value Units Date/Time    Lipid Panel [[id number]] Collected: 03/16/22 2118    Specimen: Blood Updated: 03/16/22 2219     Total Cholesterol 129 mg/dL      Triglycerides 97 mg/dL      HDL Cholesterol 56 mg/dL      LDL Cholesterol  55 mg/dL      VLDL Cholesterol 18 mg/dL      LDL/HDL Ratio 0.96    Narrative:      Cholesterol Reference Ranges  (U.S. Department of Health and Human Services ATP III Classifications)    Desirable          <200 mg/dL  Borderline High    200-239 mg/dL  High Risk          >240 mg/dL      Triglyceride Reference Ranges  (U.S. Department of Health and Human Services ATP III Classifications)    Normal           <150 mg/dL  Borderline High  150-199 mg/dL  High             200-499 mg/dL  Very High        >500 mg/dL    HDL Reference Ranges  (U.S. Department of Health and Human Services ATP III Classifications)    Low     <40 mg/dl (major risk factor for CHD)  High    >60 mg/dl ('negative' risk factor for CHD)        LDL Reference Ranges  (U.S. Department of Health and Human Services ATP III Classifications)    Optimal          <100 mg/dL  Near Optimal     100-129 mg/dL  Borderline High  130-159 mg/dL  High             160-189 mg/dL  Very High        >189 mg/dL    Protime-INR [[id number]]  (Normal) Collected: 03/16/22 2118    Specimen: Blood Updated: 03/16/22 2157     Protime 11.4 Seconds      INR 1.03    aPTT [[id number]]  (Abnormal) Collected:  03/16/22 2118    Specimen: Blood Updated: 03/16/22 2157     PTT 22.2 seconds     POC Glucose Once [[id number]]  (Abnormal) Collected: 03/16/22 1606    Specimen: Blood Updated: 03/16/22 1607     Glucose 156 mg/dL      Comment: Serial Number: [id number]:  [id number]       POC Glucose Once [[id number]]  (Abnormal) Collected: 03/16/22 1222    Specimen: Blood Updated: 03/16/22 1223     Glucose 125 mg/dL      Comment: Serial Number: [id number]:  [id number]       POC Glucose Once [[id number]]  (Abnormal) Collected: 03/16/22 0655    Specimen: Blood Updated: 03/16/22 0656     Glucose 152 mg/dL      Comment: Serial Number: [id number]:  [id number]             Imaging Results (Last 24 Hours)     Procedure Component Value Units Date/Time    CT Head Without Contrast [[id number]] Collected: 03/16/22 2036     Updated: 03/16/22 2046    Narrative:         DATE OF EXAM:  3/16/2022 8:20 PM     PROCEDURE:   CT HEAD WO CONTRAST-     INDICATIONS:   Vision loss, binocular; I50.9-Heart failure, unspecified; N28.9-Disorder  of kidney and ureter, unspecified; N18.9-Chronic kidney disease,  unspecified; I66-Baruzme effusion, not elsewhere classified     COMPARISON:  Head CT without contrast dated 05/08/2021, MRI of the brain without  contrast dated 03/14/2022     TECHNIQUE:   Routine transaxial cuts were obtained through the head without the  administration of contrast. Automated exposure control and iterative  reconstruction methods were used.     FINDINGS:   There is is encephalomalacia in the left occipital lobe is again noted  from remote infarct. There is now extensive hypodensity measuring  approximately 5 x 3 cm in size, in the contralateral right occipital  lobe which is a change from prior studies and is concerning for an acute  infarct.. There is no acute hemorrhage,5 mass effect or midline shift.  There is no hydrocephalus. Mild degree of low attenuation in the  subcortical and periventricular white matter is unchanged  in degree of  chronic microvascular ischemia. The globes and orbital contents are  normal and symmetric. The mastoid air cells and visualized paranasal  sinuses are clear. No skull fractures or calvarial lesions.          Impression:         1. Approximate 5 x 3 cm wedge-shaped hypodense region in the right  occipital lobe concerning for an acute right PCA territory infarct. This  is a new finding since 03/14/2022. No acute hemorrhages, mass effect or  midline shift.  2. Otherwise no change in generalized parenchymal volume loss evidence  of an old left infarct and chronic microvascular ischemia.  Findings were called to Ashok, the patient's nurse on 2A at 8:38 PM on  03/16/2022.     Electronically Signed By-Leonard Wilcox DO On:3/16/2022 8:44 PM  This report was finalized on [id number] by  Leonard Wilcox DO.      LAB RESULTS (LAST 7 DAYS)    CBC  Results from last 7 days   Lab Units 03/16/22 0107 03/13/22 2312 03/12/22 2023   WBC 10*3/mm3 6.60 4.40 7.20   RBC 10*6/mm3 3.47* 3.39* 3.45*   HEMOGLOBIN g/dL 10.4* 10.0* 10.5*   HEMATOCRIT % 33.0* 32.1* 32.9*   MCV fL 95.1 94.8 95.5   PLATELETS 10*3/mm3 147 190 177       BMP  Results from last 7 days   Lab Units 03/16/22 0107 03/13/22 2312 03/13/22 0408 03/12/22 2102   SODIUM mmol/L 127* 131* 132* 132*   POTASSIUM mmol/L 4.6 5.4* 5.2 4.7   CHLORIDE mmol/L 93* 95* 95* 97*   CO2 mmol/L 22.0 23.0 22.0 24.0   BUN mg/dL 36* 40* 30* 24*   CREATININE mg/dL 3.73* 4.01* 3.64* 3.62*   GLUCOSE mg/dL 126* 208* 171* 126*   PHOSPHORUS mg/dL  --   --   --  3.9       CMP   Results from last 7 days   Lab Units 03/16/22 0107 03/13/22 2312 03/13/22 0408 03/12/22 2102   SODIUM mmol/L 127* 131* 132* 132*   POTASSIUM mmol/L 4.6 5.4* 5.2 4.7   CHLORIDE mmol/L 93* 95* 95* 97*   CO2 mmol/L 22.0 23.0 22.0 24.0   BUN mg/dL 36* 40* 30* 24*   CREATININE mg/dL 3.73* 4.01* 3.64* 3.62*   GLUCOSE mg/dL 126* 208* 171* 126*   ALBUMIN g/dL  --   --   --  3.40*   BILIRUBIN mg/dL  --   --   --   0.6   ALK PHOS U/L  --   --   --  55   AST (SGOT) U/L  --   --   --  12   ALT (SGPT) U/L  --   --   --  8         BNP        TROPONIN  Results from last 7 days   Lab Units 03/12/22 2102   TROPONIN T ng/mL 0.146*       CoAg  Results from last 7 days   Lab Units 03/16/22 2118   INR  1.03   APTT seconds 22.2*       Creatinine Clearance  Estimated Creatinine Clearance: 23.2 mL/min (A) (by C-G formula based on SCr of 3.73 mg/dL (H)).    ABG        Radiology  CT Head Without Contrast    Result Date: 3/16/2022   1. Approximate 5 x 3 cm wedge-shaped hypodense region in the right occipital lobe concerning for an acute right PCA territory infarct. This is a new finding since 03/14/2022. No acute hemorrhages, mass effect or midline shift. 2. Otherwise no change in generalized parenchymal volume loss evidence of an old left infarct and chronic microvascular ischemia. Findings were called to Ashok, the patient's nurse on 2A at 8:38 PM on 03/16/2022.  Electronically Signed By-Leonard Wilcox DO On:3/16/2022 8:44 PM This report was finalized on [id number] by  Leonard Wilcox DO.              EKG            I personally viewed and interpreted the patient's EKG/Telemetry data:    ECHOCARDIOGRAM:    Results for orders placed during the hospital encounter of 03/12/22    Adult Transthoracic Echo Complete w/ Color, Spectral and Contrast if necessary per protocol    Interpretation Summary  · Left ventricular ejection fraction appears to be 26 - 30%. Left ventricular systolic function is severely decreased.  · Estimated right ventricular systolic pressure from tricuspid regurgitation is moderately elevated (45-55 mmHg). Calculated right ventricular systolic pressure from tricuspid regurgitation is 50 mmHg.  · The right ventricular cavity is mild to moderately dilated.  · There is a moderate sized left pleural effusion.          STRESS MYOVIEW:    Cardiolite (Tc-99m Sestamibi) stress test    CARDIAC CATHETERIZATION:            OTHER:          Assessment/Plan     Principal Problem:    Fluid overload  Active Problems:    Major depressive disorder, recurrent, mild (HCC)    Unspecified dementia with behavioral disturbance (HCC)    Coronary artery disease    Vitamin D deficiency    COPD with acute exacerbation (HCC)    Type 2 diabetes mellitus with hyperglycemia (HCC)    End stage renal disease (HCC)    Congestive heart failure, unspecified HF chronicity, unspecified heart failure type (HCC)    Chronic respiratory failure with hypoxia, on home oxygen therapy (HCC)    Obesity (BMI 30-39.9)    [facility].  He was discharged from this facility on 3/10/2022 for hypotension following dialysis and acute on chronic systolic heart failure.  Patient stated that he is supposed to be receiving breathing treatments but was told by the nurse at [facility] that they are not allowed to give breathing treatments.  He denies any chest pain, pressure, tightness, palpitations.  No nausea, vomiting or diarrhea.  Upon my evaluation, his breath sounds are quite coarse and audible.  He has occasional congested cough.  EKG shows atrial fibrillation with left bundle branch block at 106.  Blood pressure is quite stable 133/67.  He has no lower extremity edema.  He is currently on nasal cannula at 5L.  Patient reports that although he does make some urine it is very little.    [[[[[[[[[[[[[[[[[[[[[[[[[    Impression  ==============  -Acute on chronic fluid overload.     -status post CABG 2005. status post stent to LAD 2009    Status post stent to LAD 11/13/2015  Status post stent to mid LAD and SVG to marginal branch 09/16/2016.  Status post stent to mid LAD 5/24/2021  Status post stent to SVG to RCA 5/26/2021     Cardiac catheterization 5/24/2021 revealed  Left ventricle angiogram was not performed due to pre-existing renal dysfunction.  Left main coronary  artery normal.  Left anterior descending artery has mid segment lengthy 90% disease within the previously placed stent.  Distal left into descending artery has diffuse disease.  Circumflex coronary artery is totally occluded.  (Chronic)  Right coronary artery is chronically occluded.  SVG to marginal branch (jump graft to OM1 and OM 2) is totally occluded (new finding compared to 2015.  SVG to PDA has distal radiolucency.  However no definite obstructive disease is present.       -status post myocardial infarction 2000 and 2002 .      -history of intermittent but mostly persistent atrial fibrillation     -Acute on chronic congestive heart failure.  Compensated at this time.     Recent echocardiogram showed left ventricle dysfunction with ejection fraction of 35%.     -History of right-sided weakness with left MCA territory stroke.-Improved     Transesophageal echocardiogram 11/30/2020.  Biatrial enlargement.  Smoking effect in the left atrium and left ventricle and left atrial appendage.  Mild mitral and aortic regurgitation.  Left ventricle enlargement with diffuse hypocontractility with ejection fraction of 35 to 40%.     Echocardiogram 11/27/2020 revealed left atrial enlargement left ventricle dysfunction with ejection fraction of 40%.  Negative bubble study.      -diabetes hypertension and sleep apnea.  ESRD.     -status post colon surgery (partial colectomy) appendectomy cholecystectomy right 4th toe removal and carpal tunnel surgery      -continued smoking 1 pack per day -abstinence from smoking      -allergy to codeine.  ============   Plan  ================  Acute on chronic fluid overload.  Doing better    Status post CABG  Patient is not having any angina pectoris or congestive heart failure     Atrial fibrillation-chronic  Rate is better controlled  Patient is on Coreg at 12.5 mg p.o. twice a day amiodarone and Cardizem.      ESRD  Patient is undergoing dialysis today.      Status post stent to LAD  5/24/2021.  Status post stent to SVG to RCA 5/26/2021.    Visual disturbance  New problem  Neurology has evaluated.  Possible occipital stroke.       Anticoagulation  Patient was on Eliquis 5 mg twice a day.  Observe for toxic effects.  Patient was on Eliquis.  Eliquis was discontinued due to recent neurological issue.    Echocardiogram 3/12/2022 revealed ejection fraction of 25 to 30%.    Consider biventricular ICD if left ventricular function does not improve or congestive heart failure gets worse.    Further plan will depend on patient's progress.   ]]]]]]]]]]]]]]]]]]]]]]          Jose G Rm MD  03/17/22  06:39 EDT

## 2022-03-18 PROBLEM — E03.9 HYPOTHYROIDISM (ACQUIRED): Status: ACTIVE | Noted: 2022-01-01

## 2022-03-18 PROBLEM — M54.9 CHRONIC BACK PAIN: Status: ACTIVE | Noted: 2022-01-01

## 2022-03-18 PROBLEM — G89.29 CHRONIC BACK PAIN: Status: ACTIVE | Noted: 2022-01-01

## 2022-03-18 PROBLEM — E87.70 FLUID OVERLOAD: Status: RESOLVED | Noted: 2022-01-01 | Resolved: 2022-01-01

## 2022-03-18 PROBLEM — E53.8 VITAMIN B12 DEFICIENCY: Status: ACTIVE | Noted: 2022-01-01

## 2022-03-18 NOTE — CASE MANAGEMENT/SOCIAL WORK
Continued Stay Note  DOREEN Amin     Patient Name: Benson Mclean  MRN: [id number]  Today's Date: 3/18/2022    Admit Date: 3/12/2022     Discharge Plan     Row Name 03/18/22 1133       Plan    Plan Return to [facility] under Medicaid No precert or PASRR required, In house dialysis    Plan Comments Spoke with Eden at [facility] and Patient can return to [facility] under his Medicaid. Precert was submitted to [facility] but denied do to patient being at baseline. Updated MD and RN via [employer] secure chat.              Phone communication or documentation only - no physical contact with patient or family.      Danielle Johnson, RN

## 2022-03-18 NOTE — CONSULTS
picc team consult:    Evaluated for PIV.  Patient evaluated earlier today for US guidance midline or PIV.  See note from garo swartz rn.  Unable to obtain PIV in hand or upper arm.  Recommend consulting primary MD or NP for further direction.  Patient not currently receiving IV medications.  If IV access is desired, tunneled central line by IR would be in the patient's best interest considering ESRD.

## 2022-03-18 NOTE — PROGRESS NOTES
"RENAL/KCC:     LOS: 6 days    Patient Care Team:  Rudolph Rooney MD as PCP - General (Family Medicine)  Samantha Zabala APRN as PCP - Family Medicine  Jose G Rm MD as Consulting Physician (Cardiology)    Chief Complaint:  SOA    Subjective     Interval History:   Seen on HD.    Objective     Vital Sign Min/Max for last 24 hours  Temp  Min: 97.8 °F (36.6 °C)  Max: 98.7 °F (37.1 °C)   BP  Min: 106/84  Max: 124/63   Pulse  Min: 72  Max: 103   Resp  Min: 8  Max: 18   SpO2  Min: 97 %  Max: 100 %   Flow (L/min)  Min: 3  Max: 5   Weight  Min: 117 kg (257 lb 3.2 oz)  Max: 117 kg (257 lb 3.2 oz)     Flowsheet Rows    Flowsheet Row First Filed Value   Admission Height 177.8 cm (70\") Documented at 03/12/2022 1910   Admission Weight 113 kg (250 lb) Documented at 03/12/2022 1910          No intake/output data recorded.  I/O last 3 completed shifts:  In: 380 [P.O.:380]  Out: 0     Physical Exam:  GEN: Awake, NAD  ENT: PERRL, EOMI, MMM  NECK: Supple, no JVD  CHEST: CTAB, no W/R/C  CV: RRR, no M/G/R  ABD: Soft, NT, +BS  SKIN: Warm and Dry  NEURO: CN's intact      WBC WBC   Date Value Ref Range Status   03/16/2022 6.60 3.40 - 10.80 10*3/mm3 Final      HGB Hemoglobin   Date Value Ref Range Status   03/16/2022 10.4 (L) 13.0 - 17.7 g/dL Final      HCT Hematocrit   Date Value Ref Range Status   03/16/2022 33.0 (L) 37.5 - 51.0 % Final      Platlets No results found for: LABPLAT   MCV MCV   Date Value Ref Range Status   03/16/2022 95.1 79.0 - 97.0 fL Final          Sodium Sodium   Date Value Ref Range Status   03/16/2022 127 (L) 136 - 145 mmol/L Final      Potassium Potassium   Date Value Ref Range Status   03/16/2022 4.6 3.5 - 5.2 mmol/L Final      Chloride Chloride   Date Value Ref Range Status   03/16/2022 93 (L) 98 - 107 mmol/L Final      CO2 CO2   Date Value Ref Range Status   03/16/2022 22.0 22.0 - 29.0 mmol/L Final      BUN BUN   Date Value Ref Range Status   03/16/2022 36 (H) 8 - 23 mg/dL Final      Creatinine Creatinine "   Date Value Ref Range Status   03/16/2022 3.73 (H) 0.76 - 1.27 mg/dL Final      Calcium Calcium   Date Value Ref Range Status   03/16/2022 8.5 (L) 8.6 - 10.5 mg/dL Final      PO4 No results found for: CAPO4   Albumin No results found for: ALBUMIN   Magnesium No results found for: MG   Uric Acid No results found for: URICACID        Results Review:     I reviewed the patient's new clinical results.    atorvastatin, 40 mg, Oral, Daily  cholecalciferol, 1,000 Units, Oral, Daily  docusate sodium, 100 mg, Oral, Daily  donepezil, 10 mg, Oral, Nightly  gabapentin, 100 mg, Oral, BID  guaiFENesin, 1,200 mg, Oral, Q12H  HYDROcodone-acetaminophen, 1 tablet, Oral, BID  insulin lispro, 0-9 Units, Subcutaneous, TID AC  insulin NPH-insulin regular, 10 Units, Subcutaneous, Daily With Breakfast  ipratropium-albuterol, 3 mL, Nebulization, TID - RT  lidocaine, 5 mL, Infiltration, Once  metoprolol tartrate, 37.5 mg, Oral, BID  midodrine, 10 mg, Oral, TID AC  neomycin-bacitracin-polymyxin, 1 application, Topical, Nightly  pantoprazole, 40 mg, Oral, QAM AC  primidone, 25 mg, Oral, Daily  sertraline, 50 mg, Oral, Daily  sodium chloride, 10 mL, Intravenous, Q12H  sodium chloride, 10 mL, Intravenous, Q12H  vitamin B-12, 1,000 mcg, Oral, Daily           Medication Review: Reviewed    Assessment/Plan     1) ESRD  2) New CVA  3) Chronic hypotension  4) COPD exacerbation  5) CHF  6) Anemia of CKD    Plan: HD MWF.  BP stable.  Neuro following for CVA.          Yony Bhat MD   [facility]  03/18/22  09:39 EDT

## 2022-03-18 NOTE — CASE MANAGEMENT/SOCIAL WORK
Continued Stay Note  DOREEN Amin     Patient Name: Benson Mclean  MRN: [id number]  Today's Date: 3/18/2022    Admit Date: 3/12/2022     Discharge Plan     Row Name 03/18/22 0849       Plan    Plan Return to [facility], Precert Started 3/18, No PASRR required, In house dialysis    Plan Comments Notified Eden at [facility] that updatd pt/ot notes where in place yesterday evening, Precert to be started this AM per Eden.              Phone communication or documentation only - no physical contact with patient or family.        Danielle Johnson RN

## 2022-03-18 NOTE — PLAN OF CARE
Pt AAO x2 this shift with increasing confusion, NIH of 11, up four (4) points from last one, Neurology MD made aware of situation with new orders for CT of Head without contrast.  Pt increasingly confused, pt unable to verbalize pictures or tell story in NIH assessment, Neurology MD made aware.  CT of Head without contrast obtained, no hemorrhage noted.  Pt due to have dialysis in AM.  Will continue to monitor and observe.      Goal Outcome Evaluation:      Problem: Adult Inpatient Plan of Care  Goal: Absence of Hospital-Acquired Illness or Injury  Intervention: Identify and Manage Fall Risk  Recent Flowsheet Documentation  Taken 3/18/2022 0600 by Chandler Mckeon RN  Safety Promotion/Fall Prevention:   safety round/check completed   room organization consistent   nonskid shoes/slippers when out of bed   assistive device/personal items within reach   clutter free environment maintained   fall prevention program maintained  Taken 3/18/2022 0200 by Chandler Mckeon RN  Safety Promotion/Fall Prevention:   safety round/check completed   room organization consistent   nonskid shoes/slippers when out of bed   assistive device/personal items within reach   clutter free environment maintained   fall prevention program maintained  Taken 3/18/2022 0000 by Chandler Mckeon RN  Safety Promotion/Fall Prevention:   safety round/check completed   room organization consistent   nonskid shoes/slippers when out of bed   assistive device/personal items within reach   clutter free environment maintained   fall prevention program maintained  Taken 3/17/2022 2200 by Chandler Mckeon, RN  Safety Promotion/Fall Prevention:   safety round/check completed   room organization consistent   nonskid shoes/slippers when out of bed   assistive device/personal items within reach   clutter free environment maintained   fall prevention program maintained  Taken 3/17/2022 2000 by Chandler Mckeon RN  Safety Promotion/Fall Prevention:    safety round/check completed   room organization consistent   nonskid shoes/slippers when out of bed   assistive device/personal items within reach   clutter free environment maintained   fall prevention program maintained  Intervention: Prevent Skin Injury  Recent Flowsheet Documentation  Taken 3/18/2022 0000 by Chandler Mckeon RN  Body Position: sitting up in bed  Skin Protection:   adhesive use limited   tubing/devices free from skin contact  Taken 3/17/2022 2000 by Chandler Mckeon RN  Skin Protection:   adhesive use limited   tubing/devices free from skin contact   incontinence pads utilized  Intervention: Prevent and Manage VTE (Venous Thromboembolism) Risk  Recent Flowsheet Documentation  Taken 3/18/2022 0400 by Chandler Mckeon RN  VTE Prevention/Management:   bilateral   sequential compression devices on  Taken 3/18/2022 0000 by Chandler Mckeon RN  VTE Prevention/Management:   bilateral   sequential compression devices on  Taken 3/17/2022 2000 by Chandler Mckeon RN  VTE Prevention/Management:   bilateral   sequential compression devices off  Intervention: Prevent Infection  Recent Flowsheet Documentation  Taken 3/18/2022 0600 by Chandler Mckeon RN  Infection Prevention:   single patient room provided   rest/sleep promoted   environmental surveillance performed  Taken 3/18/2022 0200 by Chandler Mckeon RN  Infection Prevention:   single patient room provided   rest/sleep promoted   environmental surveillance performed  Taken 3/18/2022 0000 by Chandler Mckeon RN  Infection Prevention:   single patient room provided   rest/sleep promoted   environmental surveillance performed  Taken 3/17/2022 2200 by Chandler Mckeon RN  Infection Prevention:   single patient room provided   rest/sleep promoted   environmental surveillance performed  Taken 3/17/2022 2000 by Chandler Mckeon RN  Infection Prevention:   single patient room provided   rest/sleep promoted   environmental  surveillance performed  Goal: Optimal Comfort and Wellbeing  Intervention: Provide Person-Centered Care  Recent Flowsheet Documentation  Taken 3/18/2022 0400 by Chandler Mckeon RN  Trust Relationship/Rapport: care explained  Taken 3/18/2022 0000 by Chandler Mckeon RN  Trust Relationship/Rapport: care explained  Taken 3/17/2022 2000 by Chandler Mckeon RN  Trust Relationship/Rapport:   care explained   empathic listening provided   questions answered   questions encouraged   reassurance provided   thoughts/feelings acknowledged     Problem: Adjustment to Illness (Stroke, Ischemic/Transient Ischemic Attack)  Goal: Optimal Coping  Outcome: Ongoing, Progressing  Intervention: Support Psychosocial Response to Stroke  Recent Flowsheet Documentation  Taken 3/18/2022 0400 by Chandler Mckeon RN  Family/Support System Care: support provided  Taken 3/18/2022 0000 by Chandler Mckeon RN  Family/Support System Care: support provided  Taken 3/17/2022 2000 by Chandler Mckeon RN  Family/Support System Care: support provided     Problem: Respiratory Compromise (Stroke, Ischemic/Transient Ischemic Attack)  Goal: Effective Oxygenation and Ventilation  Intervention: Optimize Oxygenation and Ventilation  Recent Flowsheet Documentation  Taken 3/18/2022 0000 by Chandler Mckeon RN  Head of Bed (HOB) Positioning:   HOB elevated   HOB at 20 degrees     Problem: Sensorimotor Impairment (Stroke, Ischemic/Transient Ischemic Attack)  Goal: Improved Sensorimotor Function  Intervention: Optimize Range of Motion, Motor Control and Function  Recent Flowsheet Documentation  Taken 3/18/2022 0000 by Chandler Mckeon RN  Positioning/Transfer Devices:   pillows   in use  Intervention: Optimize Sensory and Perceptual Ability  Recent Flowsheet Documentation  Taken 3/18/2022 0000 by Chandler Mckeon RN  Pressure Reduction Techniques:   frequent weight shift encouraged   weight shift assistance provided  Pressure Reduction  Devices: pressure-redistributing mattress utilized  Taken 3/17/2022 2000 by Chandler Mckeon RN  Pressure Reduction Techniques:   frequent weight shift encouraged   weight shift assistance provided  Pressure Reduction Devices: pressure-redistributing mattress utilized

## 2022-03-18 NOTE — PROGRESS NOTES
Referring Provider: Kay Car MD    Reason for follow-up:  Congestive heart failure  Status post CABG  Status post stent     Patient Care Team:  Rudolph Rooney MD as PCP - General (Family Medicine)  Samantha Zabala APRN as PCP - Family Medicine  Jose G Rm MD as Consulting Physician (Cardiology)    Subjective .  Feeling okay     ROS    The patient has been without any chest discomfort ,shortness of breath, palpitations, dizziness or syncope.  Denies having any headache ,abdominal pain ,nausea, vomiting , diarrhea constipation, loss of weight or loss of appetite.  Denies having any excessive bruising ,hematuria or blood in the stool.    Review of all systems negative except as indicated    History  Past Medical History:   Diagnosis Date   • A-fib ([facility]) 8/3/2021   • Anemia    • Appetite loss    • Arthritis    • Brain bleed ([facility])    • Carpal tunnel syndrome on left    • CHF (congestive heart failure) ([facility])    • Chronic left-sided low back pain without sciatica 12/27/2017   • CKD (chronic kidney disease) stage 3, GFR 30-59 ml/min ([facility])    • Closed fracture of seventh thoracic vertebra ([facility]) 8/23/2020   • Cognitive communication deficit 12/1/2020   • COPD (chronic obstructive pulmonary disease) ([facility])    • Coronary artery disease    • COVID-19 2/19/2021   • Cytokine release syndrome, grade 1 5/24/2021   • Depression    • Diabetic neuropathy ([facility])    • Dialysis patient ([facility])     mon wed fri   • DM2 (diabetes mellitus, type 2) ([facility])    • GERD (gastroesophageal reflux disease)    • Hyperlipidemia    • Hypertension    • Hypogonadism in male    • Neuropathy    • Nonsustained ventricular tachycardia ([facility]) 7/23/2021   • Obesity    • Paroxysmal atrial fibrillation ([facility]) 12/1/2020   • Sleep apnea    • Stroke ([facility])    • Unsteady gait        Past Surgical History:   Procedure Laterality Date   • ANGIOPLASTY      X2   • APPENDECTOMY     • ARTERIOVENOUS FISTULA/SHUNT SURGERY Left 1/20/2022    Procedure: LEFT BRACHIAL  CEPHALIC ARTERIAL VENOUS FISTULA CREATION;  Surgeon: Yony Davila MD;  Location: [facility] MAIN OR;  Service: Vascular;  Laterality: Left;   • CARDIAC CATHETERIZATION  11/13/2015   • CARDIAC CATHETERIZATION N/A 5/24/2021    Procedure: Left Heart Cath and coronary angiogram;  Surgeon: Jose G Rm MD;  Location:  [facility] CATH INVASIVE LOCATION;  Service: Cardiovascular;  Laterality: N/A;   • CARDIAC CATHETERIZATION  5/24/2021    Procedure: Saphenous Vein Graft;  Surgeon: Jose G Rm MD;  Location:  [facility] CATH INVASIVE LOCATION;  Service: Cardiovascular;;   • CARDIAC CATHETERIZATION N/A 5/24/2021    Procedure: Percutaneous Coronary Intervention;  Surgeon: Bernabe Zambrano MD;  Location: [facility] CATH INVASIVE LOCATION;  Service: Cardiology;  Laterality: N/A;   • CARDIAC CATHETERIZATION N/A 5/24/2021    Procedure: Stent NIELS coronary;  Surgeon: Bernabe Zambrano MD;  Location: [facility] CATH INVASIVE LOCATION;  Service: Cardiology;  Laterality: N/A;   • CARDIAC CATHETERIZATION N/A 5/26/2021    Procedure: Percutaneous Coronary Intervention;  Surgeon: Bernabe Zambrano MD;  Location:  [facility] CATH INVASIVE LOCATION;  Service: Cardiovascular;  Laterality: N/A;   • CARDIAC CATHETERIZATION N/A 5/26/2021    Procedure: Stent NIELS bypass graft;  Surgeon: Bernabe Zambrano MD;  Location:  [facility] CATH INVASIVE LOCATION;  Service: Cardiovascular;  Laterality: N/A;   • CARDIAC ELECTROPHYSIOLOGY PROCEDURE N/A 5/24/2021    Procedure: Temporary Pacemaker;  Surgeon: Bernabe Zambrano MD;  Location: [facility] CATH INVASIVE LOCATION;  Service: Cardiology;  Laterality: N/A;   • CARDIAC ELECTROPHYSIOLOGY PROCEDURE N/A 5/26/2021    Procedure: Temporary Pacemaker;  Surgeon: Bernabe Zambrano MD;  Location: [facility] CATH INVASIVE LOCATION;  Service: Cardiovascular;  Laterality: N/A;   • CARPAL TUNNEL RELEASE Left 04/29/2018    carpal tunnel- lt hand// other hand surgeries    • CATARACT EXTRACTION, BILATERAL  2002    Dr. Lux Acosta   • CHOLECYSTECTOMY     •  COLON RESECTION  2005   • CORONARY ANGIOPLASTY     • CORONARY ANGIOPLASTY WITH STENT PLACEMENT  11/13/2015    PTCA stent to proximal in stent and mid to distal lad   • CORONARY ANGIOPLASTY WITH STENT PLACEMENT  09/16/2016    PTCA stent to mid lad and stent to vein graft to marginal   • CORONARY ARTERY BYPASS GRAFT  2005    @ [facility]   • CYST REMOVAL      cyst removed from scrotum   • FOOT SURGERY Right 07/17/2018   • FOOT SURGERY Left 02/04/2019   • PORTACATH PLACEMENT     • TOE AMPUTATION Right     right toe removed D/T infected cut that went to the bone       Family History   Problem Relation Age of Onset   • Heart disease Mother    • Heart disease Father    • Diabetes Sister    • Heart disease Sister    • Diabetes Brother    • Mental illness Brother        Social History     Tobacco Use   • Smoking status: Former Smoker     Packs/day: 1.00     Years: 60.00     Pack years: 60.00     Types: Cigarettes   • Smokeless tobacco: Never Used   • Tobacco comment: Patient quit smoking 11/2020   Vaping Use   • Vaping Use: Never used   Substance Use Topics   • Alcohol use: No   • Drug use: Yes     Frequency: 1.0 times per week     Types: Marijuana     Comment: socially        Medications Prior to Admission   Medication Sig Dispense Refill Last Dose   • atorvastatin (LIPITOR) 40 MG tablet Take 40 mg by mouth Every Night.      • bisacodyl (DULCOLAX) 10 MG suppository Insert 10 mg into the rectum Daily As Needed for Constipation.      • cholecalciferol (VITAMIN D3) 25 MCG (1000 UT) tablet Take 1,000 Units by mouth Daily.      • docusate sodium (COLACE) 100 MG capsule Take 100 mg by mouth Daily.      • donepezil (ARICEPT) 10 MG tablet Take 10 mg by mouth Every Night.      • fluticasone (FLONASE) 50 MCG/ACT nasal spray 2 sprays by Each Nare route 2 (Two) Times a Day.      • gabapentin (NEURONTIN) 100 MG capsule Take 100 mg by mouth 2 (Two) Times a Day.      • Glucagon (Baqsimi One Pack) 3 MG/DOSE powder 3 mg into the nostril(s) as  directed by provider Every 12 (Twelve) Hours As Needed.      • HYDROcodone-acetaminophen (NORCO) 7.5-325 MG per tablet Take 1 tablet by mouth Every 12 (Twelve) Hours As Needed.      • insulin NPH-insulin regular (humuLIN 70/30,novoLIN 70/30) (70-30) 100 UNIT/ML injection Inject 10 Units under the skin into the appropriate area as directed Daily.      • magnesium hydroxide (MILK OF MAGNESIA) 400 MG/5ML suspension Take 15 mL by mouth Daily As Needed for Constipation.      • melatonin 3 MG tablet Take 3 mg by mouth Every Night.      • Metoprolol Tartrate 37.5 MG tablet Take 37.5 mg by mouth 2 (Two) Times a Day. Hold for SBP <110 or HR < 60      • midodrine (PROAMATINE) 10 MG tablet Take 10 mg by mouth 3 (Three) Times a Day Before Meals. Hold for BP > 140/90      • Qfkvx-Dpbhb-Pynfvdc-Pramoxine (Neosporin + Pain Relief Max St) 1 % ointment Apply 1 application topically Every Night. Apply to abrasions on right inner thigh and intact blister on left inner thigh.      • Nitroglycerin 400 MCG pack Place 400 mcg under the tongue Daily As Needed (Chest pain).      • omeprazole (priLOSEC) 20 MG capsule Take 20 mg by mouth Daily.      • ondansetron ODT (ZOFRAN-ODT) 4 MG disintegrating tablet Place 4 mg on the tongue Every 8 (Eight) Hours As Needed for Nausea or Vomiting.      • phenylephrine-mineral oil-petrolatum (Preparation H) 0.25-14-74.9 % ointment hemorrhoidal ointment Insert 1 application into the rectum Daily As Needed.      • primidone (MYSOLINE) 50 MG tablet Take 25 mg by mouth Daily.      • sertraline (ZOLOFT) 50 MG tablet Take 50 mg by mouth Daily.      • tiotropium (SPIRIVA) 18 MCG per inhalation capsule Place 1 capsule into inhaler and inhale Daily.      • vitamin B-12 (CYANOCOBALAMIN) 1000 MCG tablet Take 1,000 mcg by mouth Daily.          Allergies  Codeine    Scheduled Meds:atorvastatin, 40 mg, Oral, Daily  cholecalciferol, 1,000 Units, Oral, Daily  docusate sodium, 100 mg, Oral, Daily  donepezil, 10 mg, Oral,  "Nightly  gabapentin, 100 mg, Oral, BID  guaiFENesin, 1,200 mg, Oral, Q12H  HYDROcodone-acetaminophen, 1 tablet, Oral, BID  insulin lispro, 0-9 Units, Subcutaneous, TID AC  insulin NPH-insulin regular, 10 Units, Subcutaneous, Daily With Breakfast  ipratropium-albuterol, 3 mL, Nebulization, TID - RT  lidocaine, 5 mL, Infiltration, Once  metoprolol tartrate, 37.5 mg, Oral, BID  midodrine, 10 mg, Oral, TID AC  neomycin-bacitracin-polymyxin, 1 application, Topical, Nightly  pantoprazole, 40 mg, Oral, QAM AC  primidone, 25 mg, Oral, Daily  sertraline, 50 mg, Oral, Daily  sodium chloride, 10 mL, Intravenous, Q12H  sodium chloride, 10 mL, Intravenous, Q12H  vitamin B-12, 1,000 mcg, Oral, Daily      Continuous Infusions:   PRN Meds:.bisacodyl  •  dextrose  •  dextrose  •  glucagon (human recombinant)  •  heparin (porcine)  •  insulin lispro **AND** insulin lispro  •  ipratropium-albuterol  •  melatonin  •  nitroglycerin  •  ondansetron  •  ondansetron  •  [COMPLETED] Insert peripheral IV **AND** sodium chloride  •  sodium chloride  •  sodium chloride    Objective     VITAL SIGNS  Vitals:    03/17/22 2150 03/17/22 2242 03/18/22 0250 03/18/22 0540   BP: 116/66 107/78 106/84 115/63   BP Location: Right arm Right arm Right arm Right arm   Patient Position: Lying Lying Lying Lying   Pulse: 100 99 94 98   Resp: 11 10 8 10   Temp:  98.7 °F (37.1 °C) 98.5 °F (36.9 °C) 98.2 °F (36.8 °C)   TempSrc:  Axillary Oral Oral   SpO2:  97% 100% 100%   Weight:    117 kg (257 lb 3.2 oz)   Height:           Flowsheet Rows    Flowsheet Row First Filed Value   Admission Height 177.8 cm (70\") Documented at 03/12/2022 1910   Admission Weight 113 kg (250 lb) Documented at 03/12/2022 1910            Intake/Output Summary (Last 24 hours) at 3/18/2022 0705  Last data filed at 3/18/2022 0540  Gross per 24 hour   Intake 380 ml   Output 0 ml   Net 380 ml        TELEMETRY: Atrial fibrillation    Physical Exam:  The patient is alert, oriented and in no " distress.  Vital signs as noted above.  Exogenous obesity.  Head and neck revealed no carotid bruits or jugular venous distention.  No thyromegaly or lymphadenopathy is present  Lungs clear.  No wheezing.  Breath sounds are normal bilaterally.  Heart normal first and second heart sounds.  No murmur. No precordial rub is present.  No gallop is present.  Abdomen soft and nontender.  No organomegaly is present.  Extremities with good peripheral pulses without any pedal edema.  Skin warm and dry.  Musculoskeletal system is grossly normal  CNS grossly normal      Results Review:   I reviewed the patient's new clinical results.  Lab Results (last 24 hours)     Procedure Component Value Units Date/Time    POC Glucose Once [[id number]]  (Abnormal) Collected: 03/18/22 0542    Specimen: Blood Updated: 03/18/22 0543     Glucose 111 mg/dL      Comment: Serial Number: [id number]:  [id number]       POC Glucose Once [[id number]]  (Abnormal) Collected: 03/18/22 0008    Specimen: Blood Updated: 03/18/22 0009     Glucose 125 mg/dL      Comment: Serial Number: [id number]:  [id number]       POC Glucose Once [[id number]]  (Normal) Collected: 03/17/22 1628    Specimen: Blood Updated: 03/17/22 1630     Glucose 94 mg/dL      Comment: Serial Number: [id number]:  [id number]       POC Glucose Once [[id number]]  (Abnormal) Collected: 03/17/22 1102    Specimen: Blood Updated: 03/17/22 1104     Glucose 123 mg/dL      Comment: Serial Number: [id number]:  [id number]             Imaging Results (Last 24 Hours)     Procedure Component Value Units Date/Time    CT Head Without Contrast [[id number]] Collected: 03/18/22 0329     Updated: 03/18/22 0341    Narrative:      EXAMINATION: CT HEAD WITHOUT CONTRAST    DATE: 3/18/2022 12:43 AM    INDICATION: Trauma, Pain    COMPARISON: MR brain 3/17/2022 and CT head 3/16/2022    TECHNIQUE: Noncontrast imaging obtained from the vertex to the skull base.  CT dose lowering techniques were used,  to include: automated exposure control, adjustment for patient size, and or use of iterative reconstruction.?    FINDINGS:    Soft Tissues: No significant soft tissue abnormality.    Skull: No underlying skull fracture or radiopaque foreign body.    Sinuses: Paranasal sinuses are clear.    Mastoids: Mastoid air cells are clear.     Globes and Orbits: Globes and orbits are intact.    Brain: Evolving subacute infarct changes in the right occipital lobe. Remote infarct in the left occipital lobe.  No midline shift, masses, or mass effect.  No evidence of acute infarct by noncontrast CT.    Ventricles and Cisterns: Ventricular size and configuration is within normal limits. Basal cisterns are patent. No abnormal extra-axial fluid collection.    Remote infarct changes in the right cerebellum. Generalized volume loss and chronic microvascular ischemic changes. Arteriosclerosis.        Impression:        1. Evolving subacute infarct changes in the right occipital lobe.    2. Remote infarct changes in the left occipital lobe and right cerebellum.    3. Generalized volume loss and chronic microvascular ischemic changes. Arteriosclerosis.    4. No acute hemorrhage or shift. No hydrocephalus.        Electronically signed by:  Vinh Pedroza M.D.    3/18/2022 1:40 AM    MRI Angiogram Head Without Contrast [[id number]] Resulted: 03/17/22 1837     Updated: 03/17/22 1841    Narrative:      Prelim:  Artifact vs mid diffuse atherosclerotic disease  In M2 segments   of MCAs bilaterally (artifact favored) ow/normal.  Right PCA widely   patent.    MRI Brain Without Contrast [[id number]] Collected: 03/17/22 1016     Updated: 03/17/22 1059    Addenda:        Report was called to floor at time of dictation.     Electronically Signed By-Ayden Hagen MD On:3/17/2022 10:57 AM  This report was finalized on [id number] by  Ayden Hagen MD.  Signed: 03/17/22 1057 by Ayden Hagen MD    Narrative:         DATE OF EXAM:   3/17/2022 9:10 AM      PROCEDURE:   MRI BRAIN WO CONTRAST-     INDICATIONS:   Stroke, follow up; I50.9-Heart failure, unspecified; N28.9-Disorder of  kidney and ureter, unspecified; N18.9-Chronic kidney disease,  unspecified; C99-Fzflqbn effusion, not elsewhere classified     COMPARISON:  March 14, 2022     TECHNIQUE:   Multiplanar multisequence images of the brain were performed without  contrast according to routine brain MRI protocol.      FINDINGS:   There is restricted diffusion in the right occipital lobe which has  occurred since the prior study. There is an old left occipital infarct  with gyriform T1 shortening which could reflect laminar necrosis. There  some suggested blooming in the area of the more acute infarct that could  relate to some early hemorrhagic transformation. There are some  periventricular white matter changes which could reflect more chronic  small vessel ischemic change. There some minimal signal on diffusion in  the mesial left occipital lobe where patient has had an old insult which  is of questionable significance.     The pituitary is not enlarged. There is no definite orbital abnormality.     There is some signal within the cerebellar hemispheres which could  reflect sequela to old insults in these areas.       Impression:      1.Evidence for acute infarct within the right occipital lobe which has  occurred since prior study. There is some early hemorrhagic  transformation suggested.  2.Old insult left occipital lobe.  3.Periventricular white matter changes which could reflect more chronic  small vessel ischemic change. There also is evidence for old ischemia in  the cerebellar hemispheres.     Electronically Signed By-Ayden Hagen MD On:3/17/2022 10:32 AM  This report was finalized on [id number] by  Ayden Hagen MD.      LAB RESULTS (LAST 7 DAYS)    CBC  Results from last 7 days   Lab Units 03/16/22  0107 03/13/22 2312 03/12/22 2023   WBC 10*3/mm3 6.60 4.40 7.20   RBC 10*6/mm3 3.47* 3.39* 3.45*    HEMOGLOBIN g/dL 10.4* 10.0* 10.5*   HEMATOCRIT % 33.0* 32.1* 32.9*   MCV fL 95.1 94.8 95.5   PLATELETS 10*3/mm3 147 190 177       BMP  Results from last 7 days   Lab Units 03/16/22 0107 03/13/22 2312 03/13/22 0408 03/12/22 2102   SODIUM mmol/L 127* 131* 132* 132*   POTASSIUM mmol/L 4.6 5.4* 5.2 4.7   CHLORIDE mmol/L 93* 95* 95* 97*   CO2 mmol/L 22.0 23.0 22.0 24.0   BUN mg/dL 36* 40* 30* 24*   CREATININE mg/dL 3.73* 4.01* 3.64* 3.62*   GLUCOSE mg/dL 126* 208* 171* 126*   PHOSPHORUS mg/dL  --   --   --  3.9       CMP   Results from last 7 days   Lab Units 03/16/22 0107 03/13/22 2312 03/13/22 0408 03/12/22 2102   SODIUM mmol/L 127* 131* 132* 132*   POTASSIUM mmol/L 4.6 5.4* 5.2 4.7   CHLORIDE mmol/L 93* 95* 95* 97*   CO2 mmol/L 22.0 23.0 22.0 24.0   BUN mg/dL 36* 40* 30* 24*   CREATININE mg/dL 3.73* 4.01* 3.64* 3.62*   GLUCOSE mg/dL 126* 208* 171* 126*   ALBUMIN g/dL  --   --   --  3.40*   BILIRUBIN mg/dL  --   --   --  0.6   ALK PHOS U/L  --   --   --  55   AST (SGOT) U/L  --   --   --  12   ALT (SGPT) U/L  --   --   --  8         BNP        TROPONIN  Results from last 7 days   Lab Units 03/12/22 2102   TROPONIN T ng/mL 0.146*       CoAg  Results from last 7 days   Lab Units 03/16/22 2118   INR  1.03   APTT seconds 22.2*       Creatinine Clearance  Estimated Creatinine Clearance: 23.3 mL/min (A) (by C-G formula based on SCr of 3.73 mg/dL (H)).    ABG        Radiology  CT Head Without Contrast    Result Date: 3/18/2022  1. Evolving subacute infarct changes in the right occipital lobe. 2. Remote infarct changes in the left occipital lobe and right cerebellum. 3. Generalized volume loss and chronic microvascular ischemic changes. Arteriosclerosis. 4. No acute hemorrhage or shift. No hydrocephalus. Electronically signed by:  Vinh Pedroza M.D.  3/18/2022 1:40 AM    CT Head Without Contrast    Result Date: 3/16/2022   1. Approximate 5 x 3 cm wedge-shaped hypodense region in the right occipital lobe  concerning for an acute right PCA territory infarct. This is a new finding since 03/14/2022. No acute hemorrhages, mass effect or midline shift. 2. Otherwise no change in generalized parenchymal volume loss evidence of an old left infarct and chronic microvascular ischemia. Findings were called to Ashok, the patient's nurse on 2A at 8:38 PM on 03/16/2022.  Electronically Signed By-Leonard Wilcox DO On:3/16/2022 8:44 PM This report was finalized on [id number] by  Leonard Wilcox DO.    MRI Brain Without Contrast    Addendum Date: 3/17/2022    Report was called to floor at time of dictation.  Electronically Signed By-Ayden Hagen MD On:3/17/2022 10:57 AM This report was finalized on [id number] by  Ayden Hagen MD.    Result Date: 3/17/2022  1.Evidence for acute infarct within the right occipital lobe which has occurred since prior study. There is some early hemorrhagic transformation suggested. 2.Old insult left occipital lobe. 3.Periventricular white matter changes which could reflect more chronic small vessel ischemic change. There also is evidence for old ischemia in the cerebellar hemispheres.  Electronically Signed By-Ayden Hagen MD On:3/17/2022 10:32 AM This report was finalized on [id number] by  Ayden Hagen MD.              EKG            I personally viewed and interpreted the patient's EKG/Telemetry data:    ECHOCARDIOGRAM:    Results for orders placed during the hospital encounter of 03/12/22    Adult Transthoracic Echo Complete w/ Color, Spectral and Contrast if necessary per protocol    Interpretation Summary  · Left ventricular ejection fraction appears to be 26 - 30%. Left ventricular systolic function is severely decreased.  · Estimated right ventricular systolic pressure from tricuspid regurgitation is moderately elevated (45-55 mmHg). Calculated right ventricular systolic pressure from tricuspid regurgitation is 50 mmHg.  · The right ventricular cavity is mild to moderately dilated.  ·  There is a moderate sized left pleural effusion.          STRESS MYOVIEW:    Cardiolite (Tc-99m Sestamibi) stress test    CARDIAC CATHETERIZATION:            OTHER:         Assessment/Plan     Principal Problem:    Fluid overload  Active Problems:    Major depressive disorder, recurrent, mild (HCC)    Unspecified dementia with behavioral disturbance ([facility])    Coronary artery disease    Vitamin D deficiency    COPD with acute exacerbation (HCC)    Type 2 diabetes mellitus with hyperglycemia (HCC)    End stage renal disease (HCC)    Congestive heart failure, unspecified HF chronicity, unspecified heart failure type (HCC)    Chronic respiratory failure with hypoxia, on home oxygen therapy ([facility])    Obesity (BMI 30-39.9)    Patient presented to the emergency department [facility] with complaint of shortness of breath.  He was discharged from this facility on 3/10/2022 for hypotension following dialysis and acute on chronic systolic heart failure.  Patient stated that he is supposed to be receiving breathing treatments but was told by the nurse at [facility] that they are not allowed to give breathing treatments.  He denies any chest pain, pressure, tightness, palpitations.  No nausea, vomiting or diarrhea.  Upon my evaluation, his breath sounds are quite coarse and audible.  He has occasional congested cough.  EKG shows atrial fibrillation with left bundle branch block at 106.  Blood pressure is quite stable 133/67.  He has no lower extremity edema.  He is currently on nasal cannula at 5L.  Patient reports that although he does make some urine it is very little.    [[[[[[[[[[[[[[[[[[[[[[[[[    Impression  ==============  -Acute on chronic fluid overload.     -status post CABG 2005. status post stent to LAD 2009    Status post stent to LAD 11/13/2015  Status post stent to mid LAD and SVG to marginal branch 09/16/2016.  Status post stent to mid LAD 5/24/2021  Status post stent to SVG to RCA  5/26/2021     Cardiac catheterization 5/24/2021 revealed  Left ventricle angiogram was not performed due to pre-existing renal dysfunction.  Left main coronary artery normal.  Left anterior descending artery has mid segment lengthy 90% disease within the previously placed stent.  Distal left into descending artery has diffuse disease.  Circumflex coronary artery is totally occluded.  (Chronic)  Right coronary artery is chronically occluded.  SVG to marginal branch (jump graft to OM1 and OM 2) is totally occluded (new finding compared to 2015.  SVG to PDA has distal radiolucency.  However no definite obstructive disease is present.       -status post myocardial infarction 2000 and 2002 .      -history of intermittent but mostly persistent atrial fibrillation     -Acute on chronic congestive heart failure.  Compensated at this time.     Recent echocardiogram showed left ventricle dysfunction with ejection fraction of 35%.     -History of right-sided weakness with left MCA territory stroke.-Improved     Transesophageal echocardiogram 11/30/2020.  Biatrial enlargement.  Smoking effect in the left atrium and left ventricle and left atrial appendage.  Mild mitral and aortic regurgitation.  Left ventricle enlargement with diffuse hypocontractility with ejection fraction of 35 to 40%.     Echocardiogram 11/27/2020 revealed left atrial enlargement left ventricle dysfunction with ejection fraction of 40%.  Negative bubble study.      -diabetes hypertension and sleep apnea.  ESRD.     -status post colon surgery (partial colectomy) appendectomy cholecystectomy right 4th toe removal and carpal tunnel surgery      -continued smoking 1 pack per day -abstinence from smoking      -allergy to codeine.  ============   Plan  ================  Acute on chronic fluid overload.  Doing better    Status post CABG  Patient is not having any angina pectoris or congestive heart failure     Atrial fibrillation-chronic  Rate is better  controlled  Patient is on Coreg at 12.5 mg p.o. twice a day amiodarone and Cardizem.      ESRD  Patient is undergoing dialysis today.      Status post stent to LAD 5/24/2021.  Status post stent to SVG to RCA 5/26/2021.       Anticoagulation  Patient was on Eliquis 5 mg twice a day.  Observe for toxic effects.  Patient is on Eliquis.    Echocardiogram 3/12/2022 revealed ejection fraction of 25 to 30%.    Consider biventricular ICD if left ventricular function does not improve or congestive heart failure gets worse.    Further plan will depend on patient's progress.   ]]]]]]]]]]]]]]]]]]]]]]          Jose G Rm MD  03/18/22  07:05 EDT

## 2022-03-18 NOTE — PLAN OF CARE
Plan is to hold antithrombotics until next Friday and repeat CT head to confirm no hemorrhage. Recommend to start Eliquis 5 mg bid at that time for stroke prevention and history of atrial fibrillation. 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 No further recommendations at this time.

## 2022-03-18 NOTE — PLAN OF CARE
Pt to d/c via EMS to [facility]. Report called to Perla. Instructions for f/u with vascular and when to start anticoagulation given to Perla at [facility].   Problem: Adult Inpatient Plan of Care  Goal: Plan of Care Review  Outcome: Adequate for Care Transition  Goal: Patient-Specific Goal (Individualized)  Outcome: Adequate for Care Transition  Goal: Absence of Hospital-Acquired Illness or Injury  Outcome: Adequate for Care Transition  Goal: Optimal Comfort and Wellbeing  Outcome: Adequate for Care Transition  Goal: Readiness for Transition of Care  Outcome: Adequate for Care Transition     Problem: Fall Injury Risk  Goal: Absence of Fall and Fall-Related Injury  Outcome: Adequate for Care Transition     Problem: Skin Injury Risk Increased  Goal: Skin Health and Integrity  Outcome: Adequate for Care Transition     Problem: Adjustment to Illness (Stroke, Ischemic/Transient Ischemic Attack)  Goal: Optimal Coping  Outcome: Adequate for Care Transition     Problem: Bowel Elimination Impaired (Stroke, Ischemic/Transient Ischemic Attack)  Goal: Effective Bowel Elimination  Outcome: Adequate for Care Transition     Problem: Cerebral Tissue Perfusion (Stroke, Ischemic/Transient Ischemic Attack)  Goal: Optimal Cerebral Tissue Perfusion  Outcome: Adequate for Care Transition     Problem: Cognitive Impairment (Stroke, Ischemic/Transient Ischemic Attack)  Goal: Optimal Cognitive Function  Outcome: Adequate for Care Transition     Problem: Communication Impairment (Stroke, Ischemic/Transient Ischemic Attack)  Goal: Improved Communication Skills  Outcome: Adequate for Care Transition     Problem: Functional Ability Impaired (Stroke, Ischemic/Transient Ischemic Attack)  Goal: Optimal Functional Ability  Outcome: Adequate for Care Transition     Problem: Respiratory Compromise (Stroke, Ischemic/Transient Ischemic Attack)  Goal: Effective Oxygenation and Ventilation  Outcome: Adequate for Care Transition     Problem:  Sensorimotor Impairment (Stroke, Ischemic/Transient Ischemic Attack)  Goal: Improved Sensorimotor Function  Outcome: Adequate for Care Transition     Problem: Swallowing Impairment (Stroke, Ischemic/Transient Ischemic Attack)  Goal: Optimal Eating and Swallowing without Aspiration  Outcome: Adequate for Care Transition     Problem: Urinary Elimination Impaired (Stroke, Ischemic/Transient Ischemic Attack)  Goal: Effective Urinary Elimination  Outcome: Adequate for Care Transition   Goal Outcome Evaluation:

## 2022-03-18 NOTE — DISCHARGE SUMMARY
[facility] Medicine Services  DISCHARGE SUMMARY    Patient Name: Benson Mclean  : 1950  MRN: [id number]    Date of Admission: 3/12/2022  Date of Discharge: 3/18/2022  Primary Care Physician: Rudolph Rooney MD      Presenting Problem:   Acute on chronic renal insufficiency [N28.9, N18.9]  Bilateral pleural effusion [J90]  Fluid overload [E87.70]  Congestive heart failure, unspecified HF chronicity, unspecified heart failure type (HCC) [I50.9]  CVA (cerebral vascular accident) (HCC) [I63.9]    Active and Resolved Hospital Problems:  Active Hospital Problems    Diagnosis POA   • Brain bleed (HCC) [I61.9] No     Priority: High   • Acute CVA (cerebrovascular accident) (HCC) [I63.9] No     Priority: High   • Hypothyroidism (acquired) [E03.9] Yes   • Vitamin B12 deficiency [E53.8] Yes   • Chronic back pain [M54.9, G89.29] Yes   • Chronic respiratory failure with hypoxia, on home oxygen therapy (HCC) [J96.11, Z99.81] Not Applicable   • Obesity (BMI 30-39.9) [E66.9] Yes   • End stage renal disease (HCC) [N18.6] Yes   • Anemia of renal disease [N18.9, D63.1] Yes   • Type 2 diabetes mellitus with hyperglycemia (HCC) [E11.65] Yes   • Obesity [E66.9] Yes   • COPD with acute exacerbation (HCC) [J44.1] Yes   • Major depressive disorder, recurrent, mild (HCC) [F33.0] Yes   • Unspecified dementia with behavioral disturbance (HCC) [F03.91] Yes   • Essential hypertension [I10] Yes   • S/P CABG (coronary artery bypass graft) [Z95.1] Not Applicable   • Coronary artery disease [I25.10] Yes   • Vitamin D deficiency [E55.9] Yes   • ANNETTE treated with BiPAP [G47.33] Yes   • Diabetic neuropathy (HCC) [E11.40] Yes      Resolved Hospital Problems    Diagnosis POA   • **Fluid overload [E87.70] Yes     Priority: High     Acute vision loss, new problem on 3/16/2022  Right occipital lobe ischemic CVA in right PCA territory  Old ischemic CVA left occipital lobe  -CT head showed a 5 x 3 cm wedge-shaped  "hypodense region in the right occipital lobe which was new compared to CT 3/14/2022.  No hemorrhages, mass-effect, or midline shift.  -Patient was already on apixaban and aspirin was added but subsequently both were discontinued  -MRI brain showed a stroke in the right occipital lobe with some hemorrhagic transformation  -Continue atorvastatin  -Neurology consulted: \"Plan is to hold antithrombotics until next Friday and repeat CT head to confirm no hemorrhage. Recommend to start Eliquis 5 mg bid at that time for stroke prevention and history of atrial fibrillation. 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 No further recommendations at this time.\"      Acute on chronic hypoxic respiratory failure multifactorial due to fluid overload due to missed dialysis in end-stage renal disease patient, acute on chronic CHF and COPD exacerbation     Acute exacerbation of COPD, mild  -Continue DuoNebs, guaifenesin, inhaled steroids      Acute on chronic systolic congestive heart failure secondary to ischemic cardiomyopathy and pulmonary hypertension  -Dialysis ordered for fluid removal     CAD status post CABG and stents  Hyperlipidemia  Chronic atrial fibrillation  Hypertension of CKD  -Continue home statin and metoprolol  -No anticoagulation secondary to history of brain bleed     End-stage renal disease on hemodialysis  -Nephrology consulted     Anemia of CKD  -Hemoglobin currently stable at 10.0     Insulin-dependent diabetes mellitus with diabetic peripheral neuropathy  -Hemoglobin A1c 6.3 on 3/12/2022  -Continue home insulin and gabapentin     Obstructive sleep apnea  -Encourage CPAP compliance     GERD  -Continue PPI     Morbid obesity  -Risk factor modification counseling     Dementia with behavioral disturbance  -Continue donepezil and sertraline     Tremor  -Continue primidone     Vitamin B12 and vitamin D " deficiency  -Continue supplements       Hospital Course     Hospital Course:  Benson Mclean is a 71 y.o. male with a history of end-stage renal disease, chronic systolic congestive heart failure due to ischemic cardiomyopathy, CAD status post CABG and stents, chronic atrial fibrillation, CVA with residual right-sided weakness, and chronic hypoxic respiratory failure due to COPD who presented to the emergency room from [facility] where he was noted to be having trouble breathing.  proBNP was greater than 70,000.  The patient received Solu-Medrol, IV Lasix and duo nebs in the emergency room and was improved.  Nephrology was consulted and hemodialysis was ordered.  On 3/16/2022 the patient went to dialysis and then later had an episode of feeling like his vision went black when he was working with physical therapy in an upright position.  The symptoms resolved when he was placed back in bed.  Orthostatic vitals were checked and he did not drop with changing from lying to sitting position.  The patient subsequently reported persistent vision loss.  He describes it as seeing shadows and is having trouble identifying what is on the TV or what is on his dinner plate.  He denied any weakness on one side of his body or difficulty with speech or swallowing. CT head showed a 5 x 3 cm wedge-shaped hypodense region in the right occipital lobe which was new compared to CT 3/14/2022.  No hemorrhages, mass-effect, or midline shift. Patient was already on apixaban and aspirin was added but subsequently both were discontinued because MRI brain showed a stroke in the right occipital lobe with some hemorrhagic transformation.  Neurology consulted and recommended holding apixaban and aspirin until March 25, 2022 and resuming as long as CT shows no evidence of worsening bleeding.  Patient had gradual improvement in his labs.  His swelling and his urine output improved during the course of the hospital stay.  He is medically stable  for discharge.         Day of Discharge     Vital Signs:  Temp:  [97.8 °F (36.6 °C)-98.7 °F (37.1 °C)] 97.8 °F (36.6 °C)  Heart Rate:  [] 71  Resp:  [8-17] 17  BP: (106-146)/(59-84) 146/59  Flow (L/min):  [3-5] 3    Physical Exam:  Physical Exam   Vital signs and nurses notes reviewed.  Morbidly obese gentleman in no acute distress; dialysis catheter right anterior chest; lungs clear to auscultation with decreased air entry bilaterally; CV regular rate and rhythm; abdomen soft nontender nondistended; AV fistula left arm; no lower extremity edema or cyanosis.    Pertinent  and/or Most Recent Results     LAB RESULTS:      Lab 03/18/22  1122 03/16/22 2118 03/16/22 0107 03/13/22 2312 03/12/22 2023   WBC 3.90  --  6.60 4.40 7.20   HEMOGLOBIN 10.9*  --  10.4* 10.0* 10.5*   HEMATOCRIT 34.6*  --  33.0* 32.1* 32.9*   PLATELETS 108*  --  147 190 177   NEUTROS ABS 2.50  --   --  3.60 4.70   LYMPHS ABS 0.80  --   --  0.30* 1.30   MONOS ABS 0.50  --   --  0.50 1.00*   EOS ABS 0.10  --   --  0.00 0.10   MCV 94.9  --  95.1 94.8 95.5   PROTIME  --  11.4  --   --   --    APTT  --  22.2*  --   --   --          Lab 03/16/22  0107 03/13/22 2312 03/13/22 0408 03/12/22 2102   SODIUM 127* 131* 132* 132*   POTASSIUM 4.6 5.4* 5.2 4.7   CHLORIDE 93* 95* 95* 97*   CO2 22.0 23.0 22.0 24.0   ANION GAP 12.0 13.0 15.0 11.0   BUN 36* 40* 30* 24*   CREATININE 3.73* 4.01* 3.64* 3.62*   EGFR 16.6* 15.2* 17.1* 17.2*   GLUCOSE 126* 208* 171* 126*   CALCIUM 8.5* 8.3* 8.6 8.7   PHOSPHORUS  --   --   --  3.9   HEMOGLOBIN A1C  --   --   --  6.3*   TSH  --   --   --  8.740*         Lab 03/12/22 2102   TOTAL PROTEIN 6.0   ALBUMIN 3.40*   GLOBULIN 2.6   ALT (SGPT) 8   AST (SGOT) 12   BILIRUBIN 0.6   ALK PHOS 55         Lab 03/16/22 2118 03/12/22 2102   PROBNP  --  >70,000.0*   TROPONIN T  --  0.146*   PROTIME 11.4  --    INR 1.03  --          Lab 03/16/22 2118   CHOLESTEROL 129   LDL CHOL 55   HDL CHOL 56   TRIGLYCERIDES 97             Brief  Urine Lab Results  (Last result in the past 365 days)      Color   Clarity   Blood   Leuk Est   Nitrite   Protein   CREAT   Urine HCG        02/24/22 1043 Christine   Cloudy  Comment: Result checked    Small (1+)   Large (3+)   Positive   100 mg/dL (2+)               Microbiology Results (last 10 days)     Procedure Component Value - Date/Time    COVID PRE-OP / PRE-PROCEDURE SCREENING ORDER (NO ISOLATION) - Swab, Nasopharynx [[id number]]  (Normal) Collected: 03/13/22 0002    Lab Status: Final result Specimen: Swab from Nasopharynx Updated: 03/13/22 0033    Narrative:      The following orders were created for panel order COVID PRE-OP / PRE-PROCEDURE SCREENING ORDER (NO ISOLATION) - Swab, Nasopharynx.  Procedure                               Abnormality         Status                     ---------                               -----------         ------                     COVID-19,CEPHEID/ROHAN,CO...[[id number]]  Normal              Final result                 Please view results for these tests on the individual orders.    COVID-19,CEPHEID/ROHAN,COR/[facility]/PAD/BEATRICE IN-HOUSE(OR EMERGENT/ADD-ON),NP SWAB IN TRANSPORT MEDIA 3-4 HR TAT, RT-PCR - Swab, Nasopharynx [[id number]]  (Normal) Collected: 03/13/22 0002    Lab Status: Final result Specimen: Swab from Nasopharynx Updated: 03/13/22 0033     COVID19 Not Detected    Narrative:      Fact sheet for providers: https://www.fda.gov/media/705116/download     Fact sheet for patients: https://www.fda.gov/media/699306/download  Fact sheet for providers: https://www.fda.gov/media/201171/download    Fact sheet for patients: https://www.fda.gov/media/857332/download    Test performed by PCR.          CT Abdomen Pelvis Without Contrast    Result Date: 2/24/2022  Impression: 1.Bibasilar effusions and atelectasis lower lungs. 2.Thickening of the rectal wall with some perirectal edema. Whether this in part relates to moderate stool burden in the rectosigmoid area is uncertain. Changes could be  a manifestation of proctitis. 3.There is evidence for some bladder distention. Prostate calcifications are present. 4.Bilateral adrenal lesions that could be reflective of benign change. 5.Bilateral renal lesions suggestive of cysts. The lack of contrast does make assessment somewhat limited. There is a tiny nonobstructing lower pole left renal calculus. 6.Atherosclerotic changes are present. 7.There are degenerative changes involving the axial skeleton.  Electronically Signed By-Ayden Hagen MD On:2/24/2022 9:11 AM This report was finalized on [id number] by  Ayden Hagen MD.    CT Head Without Contrast    Result Date: 3/18/2022  Impression: 1. Evolving subacute infarct changes in the right occipital lobe. 2. Remote infarct changes in the left occipital lobe and right cerebellum. 3. Generalized volume loss and chronic microvascular ischemic changes. Arteriosclerosis. 4. No acute hemorrhage or shift. No hydrocephalus. Electronically signed by:  Vinh Pedroza M.D.  3/18/2022 1:40 AM    CT Head Without Contrast    Result Date: 3/16/2022  Impression:  1. Approximate 5 x 3 cm wedge-shaped hypodense region in the right occipital lobe concerning for an acute right PCA territory infarct. This is a new finding since 03/14/2022. No acute hemorrhages, mass effect or midline shift. 2. Otherwise no change in generalized parenchymal volume loss evidence of an old left infarct and chronic microvascular ischemia. Findings were called to Ashok, the patient's nurse on 2A at 8:38 PM on 03/16/2022.  Electronically Signed By-Leonard Wilcox DO On:3/16/2022 8:44 PM This report was finalized on [id number] by  Leonard Wilcox DO.    MRI Angiogram Head Without Contrast    Result Date: 3/18/2022  Impression:  1. Limited study due to motion artifact. 2. There is no large vessel occlusion. It is difficult to exclude a focal stenosis, a small cerebral thrombus, or a small saccular aneurysm.   Electronically Signed By-Salinas Chinchilla MD  On:3/18/2022 8:17 AM This report was finalized on [id number] by  Salinas Chinchilla MD.    MRI Brain Without Contrast    Addendum Date: 3/17/2022    Report was called to floor at time of dictation.  Electronically Signed By-Ayden Hagen MD On:3/17/2022 10:57 AM This report was finalized on [id number] by  Ayden Hagen MD.    Result Date: 3/17/2022  Impression: 1.Evidence for acute infarct within the right occipital lobe which has occurred since prior study. There is some early hemorrhagic transformation suggested. 2.Old insult left occipital lobe. 3.Periventricular white matter changes which could reflect more chronic small vessel ischemic change. There also is evidence for old ischemia in the cerebellar hemispheres.  Electronically Signed By-Ayden Hagen MD On:3/17/2022 10:32 AM This report was finalized on [id number] by  Ayden Hagen MD.    MRI Brain Without Contrast    Result Date: 3/14/2022  Impression:  Tiny 5 mm focus of signal change along the periphery of the known remote left occipital infarct favored to be artifactual although a tiny solitary focus of acute ischemia cannot be entirely excluded. No evidence of acute ischemic infarct elsewhere.  Motion limited examination. No definitive areas of intracranial hemorrhage although recommend further evaluation with a noncontrast CT of the head.  Large amount of diffuse signal change of the brain consistent with chronic vascular ischemia.   Electronically Signed By-Zaire Irving On:3/14/2022 4:05 PM This report was finalized on [id number] by  Zaire Irving, .    XR Chest 1 View    Result Date: 3/12/2022  Impression: Impression: Overall similar appearance of the chest compared to the prior exam with bilateral pleural fluid collections, low lung volumes, and large cardiac silhouette. Hazy opacities in both lower lung zones could be due to atelectasis or pneumonia. Follow-up images recommended to ensure clearance. Electronically signed by:  Jacques Otto M.D.   3/12/2022 8:17 PM    XR Chest 1 View    Result Date: 3/7/2022  Impression: Moderate right, small left basilar pleural effusions. Right greater than left basilar atelectasis or pneumonia.  Electronically Signed By-Demetria Jolley MD On:3/7/2022 12:36 PM This report was finalized on [id number] by  Demetria Jolley MD.    MRI Femur Right Without Contrast    Result Date: 2/24/2022  Impression: 1.Diffuse nonspecific soft tissue edema suggesting a nonspecific systemic process. 2.Otherwise unremarkable MRI of the right femur/thigh. Electronically Signed: Alfonso Thomason MD 2/24/2022 14:16 EST      Results for orders placed during the hospital encounter of 03/12/22    Duplex Carotid Ultrasound CAR    Interpretation Summary  · Proximal right internal carotid artery moderate stenosis.  · Proximal left internal carotid artery moderate stenosis.      Results for orders placed during the hospital encounter of 03/12/22    Duplex Carotid Ultrasound CAR    Interpretation Summary  · Proximal right internal carotid artery moderate stenosis.  · Proximal left internal carotid artery moderate stenosis.      Results for orders placed during the hospital encounter of 03/12/22    Adult Transthoracic Echo Complete w/ Color, Spectral and Contrast if necessary per protocol    Interpretation Summary  · Left ventricular ejection fraction appears to be 26 - 30%. Left ventricular systolic function is severely decreased.  · Estimated right ventricular systolic pressure from tricuspid regurgitation is moderately elevated (45-55 mmHg). Calculated right ventricular systolic pressure from tricuspid regurgitation is 50 mmHg.  · The right ventricular cavity is mild to moderately dilated.  · There is a moderate sized left pleural effusion.      Labs Pending at Discharge:  Pending Labs     Order Current Status    Vitamin B12 In process          Procedures Performed           Consults:   Consults     Date and Time Order Name Status Description     3/16/2022  8:48 PM Inpatient Neurology Consult Stroke Completed     3/12/2022 11:35 PM Inpatient Cardiology Consult Completed     3/12/2022 11:35 PM Inpatient Nephrology Consult Completed     3/12/2022 10:26 PM Hospitalist (on-call MD unless specified)      3/12/2022  9:52 PM Hospitalist (on-call MD unless specified)      3/7/2022  5:09 PM Inpatient Nephrology Consult Completed             Discharge Details        Discharge Medications      New Medications      Instructions Start Date   budesonide 0.5 MG/2ML nebulizer solution  Commonly known as: Pulmicort   0.5 mg, Nebulization, 2 Times Daily      guaiFENesin 600 MG 12 hr tablet  Commonly known as: MUCINEX   1,200 mg, Oral, Every 12 Hours Scheduled      ipratropium-albuterol 0.5-2.5 mg/3 ml nebulizer  Commonly known as: DUO-NEB   3 mL, Nebulization, 3 Times Daily - RT      ipratropium-albuterol 0.5-2.5 mg/3 ml nebulizer  Commonly known as: DUO-NEB   3 mL, Nebulization, Every 2 Hours PRN      levothyroxine 50 MCG tablet  Commonly known as: SYNTHROID, LEVOTHROID   50 mcg, Oral, Every Early Morning   Start Date: March 19, 2022        Changes to Medications      Instructions Start Date   HYDROcodone-acetaminophen 7.5-325 MG per tablet  Commonly known as: NORCO  What changed: reasons to take this   1 tablet, Oral, Every 12 Hours PRN      neomycin-bacitracin-polymyxin 5-400-5000 ointment  What changed: additional instructions   1 application, Topical, Nightly, Apply to abrasions on right inner thigh and intact blister on left inner thigh until healed         Continue These Medications      Instructions Start Date   atorvastatin 40 MG tablet  Commonly known as: LIPITOR   40 mg, Oral, Nightly      Baqsimi One Pack 3 MG/DOSE powder  Generic drug: Glucagon   3 mg, Nasal, Every 12 Hours PRN      bisacodyl 10 MG suppository  Commonly known as: DULCOLAX   10 mg, Rectal, Daily PRN      cholecalciferol 25 MCG (1000 UT) tablet  Commonly known as: VITAMIN D3   1,000 Units, Oral,  Daily      docusate sodium 100 MG capsule  Commonly known as: COLACE   100 mg, Oral, Daily      donepezil 10 MG tablet  Commonly known as: ARICEPT   10 mg, Oral, Nightly      fluticasone 50 MCG/ACT nasal spray  Commonly known as: FLONASE   2 sprays, Each Nare, 2 Times Daily      gabapentin 100 MG capsule  Commonly known as: NEURONTIN   100 mg, Oral, 2 Times Daily      insulin NPH-insulin regular (70-30) 100 UNIT/ML injection  Commonly known as: humuLIN 70/30,novoLIN 70/30   10 Units, Subcutaneous, Daily      magnesium hydroxide 400 MG/5ML suspension  Commonly known as: MILK OF MAGNESIA   15 mL, Oral, Daily PRN      melatonin 3 MG tablet   3 mg, Oral, Nightly      Metoprolol Tartrate 37.5 MG tablet   37.5 mg, Oral, 2 Times Daily, Hold for SBP <110 or HR < 60      midodrine 10 MG tablet  Commonly known as: PROAMATINE   10 mg, Oral, 3 Times Daily Before Meals, Hold for BP > 140/90      Nitroglycerin 400 MCG pack   400 mcg, Sublingual, Daily PRN      omeprazole 20 MG capsule  Commonly known as: priLOSEC   20 mg, Oral, Daily      ondansetron ODT 4 MG disintegrating tablet  Commonly known as: ZOFRAN-ODT   4 mg, Translingual, Every 8 Hours PRN      Preparation H 0.25-14-74.9 % ointment hemorrhoidal ointment  Generic drug: phenylephrine-mineral oil-petrolatum   1 application, Rectal, Daily PRN      primidone 50 MG tablet  Commonly known as: MYSOLINE   25 mg, Oral, Daily      sertraline 50 MG tablet  Commonly known as: ZOLOFT   50 mg, Oral, Daily      vitamin B-12 1000 MCG tablet  Commonly known as: CYANOCOBALAMIN   1,000 mcg, Oral, Daily         Stop These Medications    Neosporin + Pain Relief Max St 1 % ointment  Generic drug: Xtypl-Pvbas-Dcnhmas-Pramoxine     tiotropium 18 MCG per inhalation capsule  Commonly known as: SPIRIVA            Allergies   Allergen Reactions   • Codeine Itching         Discharge Disposition:   Rehab Facility or Unit (DC - External)    Diet:  Hospital:  Diet Order   Procedures   • Diet  Diabetic/Consistent Carbs; Diabetic - Consistent Carb         Discharge Activity:   Activity Instructions     Activity as Tolerated              CODE STATUS:  Code Status and Medical Interventions:   Ordered at: 03/12/22 5850     Level Of Support Discussed With:    Patient     Code Status (Patient has no pulse and is not breathing):    CPR (Attempt to Resuscitate)     Medical Interventions (Patient has pulse or is breathing):    Full Support         Future Appointments   Date Time Provider Department Center   3/31/2022 10:40 AM Jose G Rm MD [facility]   5/3/2022  9:15 AM [facility] 4/[facility]   5/3/2022 10:00 AM ROOM 1,  [facility]  [facility] None       Additional Instructions for the Follow-ups that You Need to Schedule     Call MD With Problems / Concerns   As directed      Instructions: Call [phone] or email [email or web address] for problems or concerns.    Order Comments: Instructions: Call [phone] or email [email or web address] for problems or concerns.          Discharge Follow-up with PCP   As directed       Currently Documented PCP:    Rudolph Rooney MD    PCP Phone Number:    [phone]     Follow Up Details: PCP to follow at rehab or patient should follow-up with PCP after released from rehab               Time spent on Discharge including face to face service: 25 minutes    This patient has been examined wearing appropriate Personal Protective Equipment and discussed with hospital infection control department. 03/18/22      Signature: Electronically signed by Kay Car MD, 03/18/22, 1:53 PM EDT.
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
## 2022-03-18 NOTE — CASE MANAGEMENT/SOCIAL WORK
Case Management Discharge Note      Final Note: [facility]         Selected Continued Care - Discharged on 3/18/2022 Admission date: 3/12/2022 - Discharge disposition: Rehab Facility or Unit (DC - External)    Destination Coordination complete.    Service Provider Selected Services Address Phone Fax Patient Preferred    [facility] [place]  [facility] [address]  [place] [address] [phone] --              Final Discharge Disposition Code: 04 - intermediate care facility

## 2022-03-29 PROBLEM — E53.8 VITAMIN B12 DEFICIENCY: Chronic | Status: ACTIVE | Noted: 2022-01-01

## 2022-03-29 PROBLEM — I48.11 LONGSTANDING PERSISTENT ATRIAL FIBRILLATION: Chronic | Status: ACTIVE | Noted: 2020-12-01

## 2022-03-29 PROBLEM — Z86.73 HISTORY OF CEREBROVASCULAR ACCIDENT: Chronic | Status: ACTIVE | Noted: 2020-12-02

## 2022-03-29 PROBLEM — E66.9 OBESITY (BMI 30-39.9): Chronic | Status: ACTIVE | Noted: 2022-01-01

## 2022-03-29 PROBLEM — I50.32 CHRONIC DIASTOLIC CHF (CONGESTIVE HEART FAILURE): Status: ACTIVE | Noted: 2022-01-01

## 2022-03-29 PROBLEM — I48.11 LONGSTANDING PERSISTENT ATRIAL FIBRILLATION (HCC): Status: ACTIVE | Noted: 2020-12-01

## 2022-03-29 PROBLEM — Z89.429 HISTORY OF AMPUTATION OF LESSER TOE (HCC): Chronic | Status: ACTIVE | Noted: 2017-09-27

## 2022-03-29 PROBLEM — N18.30 CKD (CHRONIC KIDNEY DISEASE) STAGE 3, GFR 30-59 ML/MIN (HCC): Status: ACTIVE | Noted: 2021-06-26

## 2022-03-29 PROBLEM — E03.9 HYPOTHYROIDISM (ACQUIRED): Chronic | Status: ACTIVE | Noted: 2022-01-01

## 2022-03-29 PROBLEM — N18.30 CKD (CHRONIC KIDNEY DISEASE) STAGE 3, GFR 30-59 ML/MIN (HCC): Status: RESOLVED | Noted: 2021-06-26 | Resolved: 2022-01-01

## 2022-03-29 PROBLEM — N18.6 END STAGE RENAL DISEASE (HCC): Chronic | Status: ACTIVE | Noted: 2021-08-30

## 2022-03-30 PROBLEM — R06.00 DYSPNEA, UNSPECIFIED TYPE: Status: ACTIVE | Noted: 2022-01-01

## 2022-04-01 NOTE — PLAN OF CARE
Goal Outcome Evaluation:      Assessment: Benson Mclean presents with ADL impairments below baseline abilities which indicate the need for continued skilled intervention while inpatient. Patient very motivated to sit up today. He continues to require assist x2 (MAX-Dependent) for bed mobility. Sitting EOB ranged from CGA-MAX A. Right sided weakness continues to be a barrier, as does lack of activity tolerance. 4L O2 with no episodes of desatting noted. Tolerating session today without incident. Will continue to follow and progress as tolerated.     Plan/Recommendations:   Pt would benefit from Skilled Rehab placement at discharge from facility.   Pt desires Skilled Rehab placement at discharge. Pt cooperative; agreeable to therapeutic recommendations and plan of care.

## 2022-04-01 NOTE — THERAPY TREATMENT NOTE
Subjective: Pt agreeable to therapeutic plan of care.    Objective:     Bed mobility - Max-A, Assist x 2 and Dependent  Transfers - N/A or Not attempted.  Ambulation - unable to attempted  Sitting EOB: Pt able to sit EOB >10 minutes with assist varying CGA-maxA.  Pt able to sit EOB ~20 seconds at a time with CGA before needing assist due to weakness.    Pain: 3 VAS  Pt reports back stiffness from immobility  Education: Provided education on importance of mobility and skilled verbal / tactile cueing throughout intervention.     Assessment: Benson Mclean presents with functional mobility impairments which indicate the need for skilled intervention. Tolerating session today without incident. Pt motivated to sit EOB this date.  Pt continues to require maxAx2-depAx2 due to weakness and fatigue.  Unable to attempt transfer due to weakness.  Will continue to follow and progress as tolerated.     Plan/Recommendations:   Pt would benefit from Skilled Rehab placement at discharge from facility and requires no DME at discharge.   Pt desires Skilled Rehab placement at discharge. Pt cooperative; agreeable to therapeutic recommendations and plan of care.     Basic Mobility 6-click:  Rollin = Total, A lot = 2, A little = 3; 4 = None  Supine>Sit:   1 = Total, A lot = 2, A little = 3; 4 = None   Sit>Stand with arms:  1 = Total, A lot = 2, A little = 3; 4 = None  Bed>Chair:   1 = Total, A lot = 2, A little = 3; 4 = None  Ambulate in room:  1 = Total, A lot = 2, A little = 3; 4 = None  3-5 Steps with railin = Total, A lot = 2, A little = 3; 4 = None  Score: 7    Modified Fredy: 5 = Severe disability (Requires constant nursing care and attention, bedridden, incontinent)    Post-Tx Position: Supine with HOB Elevated, Alarms activated and Call light and personal items within reach  PPE: gloves, surgical mask, eyewear protection

## 2022-04-01 NOTE — PROGRESS NOTES
"RENAL/KCC:     LOS: 3 days    Patient Care Team:  Rudolph Rooney MD as PCP - General (Family Medicine)  Samantha Zabala APRN as PCP - Family Medicine  Jose G Rm MD as Consulting Physician (Cardiology)    Chief Complaint:  ESRD    Subjective     Interval History:   Chart reviewed.  Seen on HD.  CT remains in place.  States SOA improved some but still with productive cough.    Objective     Vital Sign Min/Max for last 24 hours  Temp  Min: 97.4 °F (36.3 °C)  Max: 98.6 °F (37 °C)   BP  Min: 86/54  Max: 98/51   Pulse  Min: 73  Max: 96   Resp  Min: 18  Max: 20   SpO2  Min: 94 %  Max: 100 %   Flow (L/min)  Min: 4  Max: 30   No data recorded     Flowsheet Rows    Flowsheet Row First Filed Value   Admission Height 177.8 cm (70\") Documented at 03/29/2022 2047   Admission Weight 117 kg (257 lb) Documented at 03/29/2022 2047          No intake/output data recorded.  No intake/output data recorded.    Physical Exam:  GEN: Awake, NAD  ENT: PERRL, EOMI, MMM  NECK: Supple, no JVD  CHEST: Coarse bilaterally  CV: RRR, no M/G/R  ABD: Soft, NT, +BS  SKIN: Warm and Dry  NEURO: CN's intact      WBC WBC   Date Value Ref Range Status   04/01/2022 6.90 3.40 - 10.80 10*3/mm3 Final   03/31/2022 7.70 3.40 - 10.80 10*3/mm3 Final   03/30/2022 7.10 3.40 - 10.80 10*3/mm3 Final   03/29/2022 6.90 3.40 - 10.80 10*3/mm3 Final      HGB Hemoglobin   Date Value Ref Range Status   04/01/2022 11.6 (L) 13.0 - 17.7 g/dL Final   03/31/2022 10.9 (L) 13.0 - 17.7 g/dL Final   03/30/2022 11.2 (L) 13.0 - 17.7 g/dL Final   03/29/2022 11.0 (L) 13.0 - 17.7 g/dL Final      HCT Hematocrit   Date Value Ref Range Status   04/01/2022 35.3 (L) 37.5 - 51.0 % Final   03/31/2022 33.1 (L) 37.5 - 51.0 % Final   03/30/2022 35.1 (L) 37.5 - 51.0 % Final   03/29/2022 34.6 (L) 37.5 - 51.0 % Final      Platlets No results found for: LABPLAT   MCV MCV   Date Value Ref Range Status   04/01/2022 93.4 79.0 - 97.0 fL Final   03/31/2022 91.7 79.0 - 97.0 fL Final   03/30/2022 95.5 " 79.0 - 97.0 fL Final   03/29/2022 92.9 79.0 - 97.0 fL Final          Sodium Sodium   Date Value Ref Range Status   04/01/2022 133 (L) 136 - 145 mmol/L Final   03/31/2022 136 136 - 145 mmol/L Final   03/30/2022 132 (L) 136 - 145 mmol/L Final   03/29/2022 133 (L) 136 - 145 mmol/L Final      Potassium Potassium   Date Value Ref Range Status   04/01/2022 4.4 3.5 - 5.2 mmol/L Final     Comment:     Slight hemolysis detected by analyzer. Results may be affected.   03/31/2022 4.2 3.5 - 5.2 mmol/L Final   03/30/2022 4.3 3.5 - 5.2 mmol/L Final   03/29/2022 3.8 3.5 - 5.2 mmol/L Final      Chloride Chloride   Date Value Ref Range Status   04/01/2022 100 98 - 107 mmol/L Final   03/31/2022 101 98 - 107 mmol/L Final   03/30/2022 95 (L) 98 - 107 mmol/L Final   03/29/2022 96 (L) 98 - 107 mmol/L Final      CO2 CO2   Date Value Ref Range Status   04/01/2022 19.0 (L) 22.0 - 29.0 mmol/L Final   03/31/2022 24.0 22.0 - 29.0 mmol/L Final   03/30/2022 20.0 (L) 22.0 - 29.0 mmol/L Final   03/29/2022 21.0 (L) 22.0 - 29.0 mmol/L Final      BUN BUN   Date Value Ref Range Status   04/01/2022 28 (H) 8 - 23 mg/dL Final   03/31/2022 20 8 - 23 mg/dL Final   03/30/2022 26 (H) 8 - 23 mg/dL Final   03/29/2022 25 (H) 8 - 23 mg/dL Final      Creatinine Creatinine   Date Value Ref Range Status   04/01/2022 3.38 (H) 0.76 - 1.27 mg/dL Final   03/31/2022 2.75 (H) 0.76 - 1.27 mg/dL Final   03/30/2022 3.71 (H) 0.76 - 1.27 mg/dL Final   03/29/2022 3.55 (H) 0.76 - 1.27 mg/dL Final      Calcium Calcium   Date Value Ref Range Status   04/01/2022 8.1 (L) 8.6 - 10.5 mg/dL Final   03/31/2022 8.3 (L) 8.6 - 10.5 mg/dL Final   03/30/2022 8.4 (L) 8.6 - 10.5 mg/dL Final   03/29/2022 8.4 (L) 8.6 - 10.5 mg/dL Final      PO4 No results found for: CAPO4   Albumin No results found for: ALBUMIN   Magnesium No results found for: MG   Uric Acid No results found for: URICACID        Results Review:     I reviewed the patient's new clinical results.    apixaban, 5 mg, Oral,  Q12H  atorvastatin, 40 mg, Oral, Nightly  budesonide, 0.5 mg, Nebulization, BID  docusate sodium, 100 mg, Oral, Daily  donepezil, 10 mg, Oral, Nightly  guaiFENesin, 600 mg, Oral, BID  insulin lispro, 0-14 Units, Subcutaneous, Q6H  ipratropium-albuterol, 3 mL, Nebulization, 4x Daily - RT  levothyroxine, 50 mcg, Oral, Q AM  metoprolol tartrate, 37.5 mg, Oral, BID  midodrine, 10 mg, Oral, TID AC  pantoprazole, 40 mg, Oral, QAM AC  primidone, 25 mg, Oral, Daily  sertraline, 50 mg, Oral, Daily           Medication Review: Reviewed    Assessment/Plan       Acute on chronic systolic congestive heart failure (HCC)    S/P CABG (coronary artery bypass graft)    Essential hypertension    Status post coronary artery stent placement    ANNETTE treated with BiPAP    Major depressive disorder, recurrent, mild (HCC)    Mixed hyperlipidemia    History of cerebrovascular accident    History of amputation of lesser toe (HCC)    Flaccid hemiplegia of right dominant side as late effect of cerebral infarction (HCC)    Diabetic neuropathy (HCC)    Unspecified dementia with behavioral disturbance (HCC)    Coronary artery disease    Obesity    Vitamin D deficiency    Chronic venous hypertension (idiopathic) with ulcer and inflammation of bilateral lower extremity (HCC)    Longstanding persistent atrial fibrillation (HCC)    Type 2 diabetes mellitus with hyperglycemia (HCC)    Anemia of renal disease    End stage renal disease (HCC)    Dependence on intermittent renal dialysis (HCC)    Congestive heart failure, unspecified HF chronicity, unspecified heart failure type (HCC)    Obesity (BMI 30-39.9)    Acute CVA (cerebrovascular accident) (HCC)    Hypothyroidism (acquired)    Vitamin B12 deficiency    Dyspnea, unspecified type      Plan: Seen on HD.  Continue MWF HD with UF as tolerated.  Continue Midodrine for BP support.  Pleural drain per Pulm.  Will follow.      Yony Bhat MD   [facility]  04/01/22  10:48 EDT

## 2022-04-01 NOTE — PROGRESS NOTES
Nutrition Services    Patient Name: Benson Mclean  YOB: 1950  MRN: [id number]  Admission date: 3/29/2022    PPE Documentation        PPE Worn By Provider Did not enter room for this encounter   PPE Worn By Patient  N/A     PROGRESS NOTE      Encounter Information: Check on for ability to feed.  Noted with plans for HD today.  Possible VFSS today, SLP previously recommending NPO 3/31.  RD reached out to RN via Secure Chat re: need for nutrition support if patient unable to eat.  She will collaborate with SLP and if patient unable to get VFSS or does not pass, will plan to place DHT and consult for tube feedings.  At this time patient has been NPO x 3 days of admission.  RD previously worked up tube feeding in initial assessment.         PO Diet: NPO Diet   PO Supplements:    PO Intake:         Nutrition support orders:    Nutrition support review:        Labs (reviewed below): Hyponatremia  Elevated BUN/Cr  Hypocalcemia  Hypernatremia         GI Function:  No BM since admission (x 3 days)       Nutrition Intervention: RD will continue to monitor for tube feeding consult and plans for nutrition support        Results from last 7 days   Lab Units 04/01/22  0442 03/31/22  0503 03/30/22  0456   SODIUM mmol/L 133* 136 132*   POTASSIUM mmol/L 4.4 4.2 4.3   CHLORIDE mmol/L 100 101 95*   CO2 mmol/L 19.0* 24.0 20.0*   BUN mg/dL 28* 20 26*   CREATININE mg/dL 3.38* 2.75* 3.71*   CALCIUM mg/dL 8.1* 8.3* 8.4*   GLUCOSE mg/dL 125* 97 179*     Results from last 7 days   Lab Units 04/01/22  0824   HEMOGLOBIN g/dL 11.6*   HEMATOCRIT % 35.3*     COVID19   Date Value Ref Range Status   03/29/2022 Not Detected Not Detected - Ref. Range Final     Lab Results   Component Value Date    HGBA1C 6.3 (H) 03/12/2022       RD to follow up per protocol.    Electronically signed by:  Promise Ramirez RD  04/01/22 10:20 EDT

## 2022-04-01 NOTE — PROGRESS NOTES
[facility] Medicine Services Daily Progress Note    Patient Name: Benson Mclean  : 1950  MRN: [id number]  Primary Care Physician:  Rudolph Rooney MD  Date of admission: 3/29/2022      Subjective      Chief Complaint: Shortness of breath    Patient seen and examined this morning.  Continues to feel better, wean down to nasal cannula.  Dialysis planned again today.  Patient does not make any urine, will DC Lasix.  Overall improving.  No other complaints.    Pertinent positives as noted in HPI/subjective.  All other systems were reviewed and are negative.      Objective      Vitals:   Temp:  [97.4 °F (36.3 °C)-98.6 °F (37 °C)] 97.7 °F (36.5 °C)  Heart Rate:  [73-96] 77  Resp:  [18-20] 18  BP: (86-98)/(50-60) 89/58  Flow (L/min):  [4-30] 5    Physical Exam:    General: Awake and alert, lying in bed, chronically ill-appearing, NAD  Eyes: PERRL, EOMI, conjunctive are clear  Cardiovascular: Regular rate and rhythm, no murmurs  Respiratory: Decreased breath sounds bilaterally, improving, no wheezing, on Airvo  Abdomen: Soft, obese, nontender, positive bowel sounds, no guarding  Neurologic: A&O, CN grossly intact, moves all extremities spontaneously  Musculoskeletal: Generalized weakness with right greater than left noted, no gross deformities  Skin: Warm, dry, intact         Result Review    Result Review:  I have personally reviewed the results from the time of this admission to 2022 09:34 EDT and agree with these findings:  [x]  Laboratory  [x]  Microbiology  [x]  Radiology  [x]  EKG/Telemetry   []  Cardiology/Vascular   []  Pathology  []  Old records  []  Other:    Wounds (last 24 hours)     LDA Wound     Row Name 22 0414 22 0025 22 2030       Wound 22 0340 perineum Pressure Injury    Wound - Properties Group Placement Date: 22  -AS Placement Time: 340  -AS Present on Hospital Admission: Y  -AS Location: perineum  -AS Primary Wound Type: Pressure inj   -AS    Drainage Amount none  -CW none  -CW none  -CW    Retired Wound - Properties Group Placement Date: 03/30/22  -AS Placement Time: 0340  -AS Present on Hospital Admission: Y  -AS Location: perineum  -AS Primary Wound Type: Pressure inj  -AS    Retired Wound - Properties Group Date first assessed: 03/30/22  -AS Time first assessed: 0340  -AS Present on Hospital Admission: Y  -AS Location: perineum  -AS Primary Wound Type: Pressure inj  -AS       Wound 03/30/22 0341 Left posterior thigh    Wound - Properties Group Placement Date: 03/30/22  -AS Placement Time: 0341  -AS Present on Hospital Admission: Y  -AS Side: Left  -AS Orientation: posterior  -AS Location: thigh  -AS    Closure None  -CW None  -CW None  -CW    Drainage Amount none  -CW none  -CW none  -CW    Retired Wound - Properties Group Placement Date: 03/30/22  -AS Placement Time: 0341  -AS Present on Hospital Admission: Y  -AS Side: Left  -AS Orientation: posterior  -AS Location: thigh  -AS    Retired Wound - Properties Group Date first assessed: 03/30/22  -AS Time first assessed: 0341  -AS Present on Hospital Admission: Y  -AS Side: Left  -AS Location: thigh  -AS       Wound 03/30/22 0342 Right popliteal    Wound - Properties Group Placement Date: 03/30/22  -AS Placement Time: 0342  -AS Present on Hospital Admission: Y  -AS Side: Right  -AS Location: popliteal  -AS    Closure None  -CW None  -CW None  -CW    Drainage Amount none  -CW none  -CW none  -CW    Retired Wound - Properties Group Placement Date: 03/30/22  -AS Placement Time: 0342  -AS Present on Hospital Admission: Y  -AS Side: Right  -AS Location: popliteal  -AS    Retired Wound - Properties Group Date first assessed: 03/30/22  -AS Time first assessed: 0342  -AS Present on Hospital Admission: Y  -AS Side: Right  -AS Location: popliteal  -AS       Wound 03/30/22 0343 Left heel    Wound - Properties Group Placement Date: 03/30/22  -AS Placement Time: 0343  -AS Present on Hospital Admission: Y   -AS Side: Left  -AS Location: heel  -AS    Drainage Amount none  -CW none  -CW none  -CW    Retired Wound - Properties Group Placement Date: 03/30/22  -AS Placement Time: 0343  -AS Present on Hospital Admission: Y  -AS Side: Left  -AS Location: heel  -AS    Retired Wound - Properties Group Date first assessed: 03/30/22  -AS Time first assessed: 0343  -AS Present on Hospital Admission: Y  -AS Side: Left  -AS Location: heel  -AS       Wound 03/30/22 0348 Right anterior ankle    Wound - Properties Group Placement Date: 03/30/22  -AS Placement Time: 0348  -AS Present on Hospital Admission: Y  -AS Side: Right  -AS Orientation: anterior  -AS Location: ankle  -AS    Closure None  -CW None  -CW None  -CW    Drainage Amount none  -CW none  -CW none  -CW    Retired Wound - Properties Group Placement Date: 03/30/22  -AS Placement Time: 0348  -AS Present on Hospital Admission: Y  -AS Side: Right  -AS Orientation: anterior  -AS Location: ankle  -AS    Retired Wound - Properties Group Date first assessed: 03/30/22  -AS Time first assessed: 0348  -AS Present on Hospital Admission: Y  -AS Side: Right  -AS Location: ankle  -AS       Wound 03/30/22 0349 Left antecubital    Wound - Properties Group Placement Date: 03/30/22  -AS Placement Time: 0349  -AS Present on Hospital Admission: Y  -AS Side: Left  -AS Location: antecubital  -AS    Closure Approximated  -CW Approximated  -CW Approximated  -CW    Drainage Amount none  -CW none  -CW none  -CW    Retired Wound - Properties Group Placement Date: 03/30/22  -AS Placement Time: 0349  -AS Present on Hospital Admission: Y  -AS Side: Left  -AS Location: antecubital  -AS    Retired Wound - Properties Group Date first assessed: 03/30/22  -AS Time first assessed: 0349  -AS Present on Hospital Admission: Y  -AS Side: Left  -AS Location: antecubital  -AS    Row Name 03/31/22 1600 03/31/22 1200          Wound 03/30/22 0340 perineum Pressure Injury    Wound - Properties Group Placement Date:  03/30/22  -AS Placement Time: 0340  -AS Present on Hospital Admission: Y  -AS Location: perineum  -AS Primary Wound Type: Pressure inj  -AS     Pressure Injury Stage 1  -MS 1  -MS     Dressing Appearance open to air  -MS open to air  -MS     Drainage Amount none  -MS none  -MS     Dressing Care open to air  -MS open to air  -MS     Periwound Care dry periwound area maintained  -MS dry periwound area maintained  -MS     Retired Wound - Properties Group Placement Date: 03/30/22  -AS Placement Time: 0340  -AS Present on Hospital Admission: Y  -AS Location: perineum  -AS Primary Wound Type: Pressure inj  -AS     Retired Wound - Properties Group Date first assessed: 03/30/22  -AS Time first assessed: 0340  -AS Present on Hospital Admission: Y  -AS Location: perineum  -AS Primary Wound Type: Pressure inj  -AS            Wound 03/30/22 0341 Left posterior thigh    Wound - Properties Group Placement Date: 03/30/22  -AS Placement Time: 0341  -AS Present on Hospital Admission: Y  -AS Side: Left  -AS Orientation: posterior  -AS Location: thigh  -AS     Dressing Appearance open to air  -MS open to air  -MS     Closure None  -MS None  -MS     Drainage Amount none  -MS none  -MS     Dressing Care open to air  -MS open to air  -MS     Periwound Care dry periwound area maintained  -MS dry periwound area maintained  -MS     Retired Wound - Properties Group Placement Date: 03/30/22  -AS Placement Time: 0341  -AS Present on Hospital Admission: Y  -AS Side: Left  -AS Orientation: posterior  -AS Location: thigh  -AS     Retired Wound - Properties Group Date first assessed: 03/30/22  -AS Time first assessed: 0341  -AS Present on Hospital Admission: Y  -AS Side: Left  -AS Location: thigh  -AS            Wound 03/30/22 0342 Right popliteal    Wound - Properties Group Placement Date: 03/30/22  -AS Placement Time: 0342  -AS Present on Hospital Admission: Y  -AS Side: Right  -AS Location: popliteal  -AS     Dressing Appearance open to air   -MS open to air  -MS     Closure None  -MS None  -MS     Drainage Amount none  -MS none  -MS     Dressing Care open to air  -MS open to air  -MS     Periwound Care dry periwound area maintained  -MS dry periwound area maintained  -MS     Retired Wound - Properties Group Placement Date: 03/30/22  -AS Placement Time: 0342  -AS Present on Hospital Admission: Y  -AS Side: Right  -AS Location: popliteal  -AS     Retired Wound - Properties Group Date first assessed: 03/30/22  -AS Time first assessed: 0342  -AS Present on Hospital Admission: Y  -AS Side: Right  -AS Location: popliteal  -AS            Wound 03/30/22 0343 Left heel    Wound - Properties Group Placement Date: 03/30/22  -AS Placement Time: 0343  -AS Present on Hospital Admission: Y  -AS Side: Left  -AS Location: heel  -AS     Pressure Injury Stage 1  -MS 1  -MS     Dressing Appearance open to air  -MS open to air  -MS     Drainage Amount none  -MS none  -MS     Dressing Care open to air  -MS open to air  -MS     Periwound Care dry periwound area maintained  -MS dry periwound area maintained  -MS     Retired Wound - Properties Group Placement Date: 03/30/22  -AS Placement Time: 0343  -AS Present on Hospital Admission: Y  -AS Side: Left  -AS Location: heel  -AS     Retired Wound - Properties Group Date first assessed: 03/30/22  -AS Time first assessed: 0343  -AS Present on Hospital Admission: Y  -AS Side: Left  -AS Location: heel  -AS            Wound 03/30/22 0348 Right anterior ankle    Wound - Properties Group Placement Date: 03/30/22  -AS Placement Time: 0348  -AS Present on Hospital Admission: Y  -AS Side: Right  -AS Orientation: anterior  -AS Location: ankle  -AS     Dressing Appearance open to air  -MS open to air  -MS     Closure None  -MS None  -MS     Drainage Amount none  -MS none  -MS     Dressing Care open to air  -MS open to air  -MS     Periwound Care dry periwound area maintained  -MS dry periwound area maintained  -MS     Retired Wound -  Properties Group Placement Date: 03/30/22  -AS Placement Time: 0348  -AS Present on Hospital Admission: Y  -AS Side: Right  -AS Orientation: anterior  -AS Location: ankle  -AS     Retired Wound - Properties Group Date first assessed: 03/30/22  -AS Time first assessed: 0348  -AS Present on Hospital Admission: Y  -AS Side: Right  -AS Location: ankle  -AS            Wound 03/30/22 0349 Left antecubital    Wound - Properties Group Placement Date: 03/30/22  -AS Placement Time: 0349  -AS Present on Hospital Admission: Y  -AS Side: Left  -AS Location: antecubital  -AS     Dressing Appearance open to air  -MS open to air  -MS     Closure Approximated  -MS Approximated  -MS     Drainage Amount none  -MS none  -MS     Dressing Care open to air  -MS open to air  -MS     Periwound Care dry periwound area maintained  -MS dry periwound area maintained  -MS     Retired Wound - Properties Group Placement Date: 03/30/22  -AS Placement Time: 0349  -AS Present on Hospital Admission: Y  -AS Side: Left  -AS Location: antecubital  -AS     Retired Wound - Properties Group Date first assessed: 03/30/22  -AS Time first assessed: 0349  -AS Present on Hospital Admission: Y  -AS Side: Left  -AS Location: antecubital  -AS           User Key  (r) = Recorded By, (t) = Taken By, (c) = Cosigned By    Initials Name Provider Type    CW Manisha Mondragon RN Registered Nurse    AS Maricruz Mi RN Registered Nurse    Misti Anders RN Registered Nurse                  Assessment/Plan      Brief Patient Summary:  Benson Mclean is a 71 y.o. male with multiple medical issues including ESRD on HD Opxdjz-Mtrxppqnu-Lfwawd, chronic systolic congestive heart failure, CAD s/p CABG, cardiac stent, chronic atrial fibrillation, CVA with residual right-sided weakness, COPD on chronic oxygen, ANNETTE on CPap, previous PE,  HTN, HLD, type 2 diabetes mellitus complicated by neuropathy, anemia of renal disease, vitamin deficiencies, and dementia who  presented to [facility] on 3/29/2022 complaining of shortness of breath.    Patient is poor historian but states he has been feeling shortness of breath for past few days.  Unknown of last dialysis.  Was recently here for CHF exacerbation and was diagnosed with ischemic CVA at the time with mild hemorrhagic conversion.  Was discharged to rehab and plan for restarting Eliquis if repeat CT head is stable per neurology recommendations.  CT of the head on admission is stable.  On admission patient severely hypoxic and placed on high flow oxygen.  Imaging report noted to have large right-sided pleural effusion, pulmonology and nephrology consulted.  Chest tube placed by pulmonology on 3/30.  Transudate of fluid with Cultures negative so far.      apixaban, 5 mg, Oral, Q12H  atorvastatin, 40 mg, Oral, Nightly  budesonide, 0.5 mg, Nebulization, BID  docusate sodium, 100 mg, Oral, Daily  donepezil, 10 mg, Oral, Nightly  guaiFENesin, 600 mg, Oral, BID  insulin lispro, 0-14 Units, Subcutaneous, Q6H  ipratropium-albuterol, 3 mL, Nebulization, 4x Daily - RT  levothyroxine, 50 mcg, Oral, Q AM  metoprolol tartrate, 37.5 mg, Oral, BID  midodrine, 10 mg, Oral, TID AC  pantoprazole, 40 mg, Oral, QAM AC  primidone, 25 mg, Oral, Daily  sertraline, 50 mg, Oral, Daily             Active Hospital Problems:  Active Hospital Problems    Diagnosis    • **Acute on chronic systolic congestive heart failure (HCC)    • Dyspnea, unspecified type    • Vitamin B12 deficiency    • Hypothyroidism (acquired)    • Acute CVA (cerebrovascular accident) (HCC)      Right occipital with residual vision loss     • Obesity (BMI 30-39.9)    • Congestive heart failure, unspecified HF chronicity, unspecified heart failure type (HCC)    • Dependence on intermittent renal dialysis (HCC)    • End stage renal disease (HCC)    • Anemia of renal disease    • Type 2 diabetes mellitus with hyperglycemia (HCC)    • Mixed hyperlipidemia    • Obesity    •  History of cerebrovascular accident    • Major depressive disorder, recurrent, mild (HCC)    • Flaccid hemiplegia of right dominant side as late effect of cerebral infarction (HCC)    • Longstanding persistent atrial fibrillation (HCC)    • Unspecified dementia with behavioral disturbance (HCC)    • Essential hypertension    • S/P CABG (coronary artery bypass graft)    • Chronic venous hypertension (idiopathic) with ulcer and inflammation of bilateral lower extremity (HCC)    • Coronary artery disease    • Vitamin D deficiency    • History of amputation of lesser toe (HCC)    • ANNETTE treated with BiPAP    • Diabetic neuropathy (HCC)    • Status post coronary artery stent placement      Plan:     Acute on chronic hypoxic and hypercapnic respiratory failure, improving  Large right-sided pleural effusion  Acute on chronic HFrEF  -Multifactorial with underlying ANNETTE, and CHF with morbid obesity  -Imaging reports noted  -s/p right chest tube placement by pulmonology on 3/30, transudate fluid noted  -Diuresis ultrafiltration per nephrology  -Patient does not make urine, Lasix discontinued  -Continue other home meds  -Pulmonology following, palliative care following, remains full code     ESRD on HD  -Ultrafiltration as tolerated  -Midodrine for blood pressure support  -Continue dialysis support per nephrology    Recent CVA  -Diagnosed during last admission, previous imaging reports and discharge summary noted  -Has some residual right-sided deficits  -Repeat CT head this admission is stable, no hemorrhage noted  -Per previous notes, plan was to start Eliquis for his A. fib if repeat CT is stable per neurology recommendations  -Started on Eliquis this admission    PAF  -Rate controlled currently, continue home meds  -Eliquis started  -Monitor on telemetry     DM2  Dysphagia  -Patient much more awake and alert, going for VFSS today  -Basal insulin stopped due to hypoglycemia, resume when able  -Continue sliding scale, monitor  blood glucose  -Adjust as needed    Hypothyroidism  -Continue home meds     Major depressive disorder, recurrent, mild   -Continue home meds, monitor     Obesity (BMI 30-39.9)  BMI 36.92  -Lifestyle modifications to reduce cardiovascular risk factors    DVT ppx  -Eliquis       CODE STATUS:    Code Status (Patient has no pulse and is not breathing): CPR (Attempt to Resuscitate)  Medical Interventions (Patient has pulse or is breathing): Full Support      Disposition: Rehab placement once improved    Electronically signed by Ivan Tapia DO, 04/01/22, 09:34 EDT.  [facility] Hospitalist Team

## 2022-04-01 NOTE — PLAN OF CARE
Goal Outcome Evaluation:               Video fluoroscopic swallow study conducted in the lateral position with pt seated upright. Pt self fed all trials as provided by SLP respectively: thin liquid by straw x 2, ntl by spoon x2, ntl by straw x1, puree (applesauce) x 2, mechanical soft/mixed consistency (peaches) x 2, and regular solid (cracker) x 1 w/ntl wash. Pt presents w/a moderate to severe oropharyngeal dysphagia characterized by disorganized and prolonged oral transit of all consistencies w/poor bolus control, severely prolonged mastication and A-P transit of regular solids requiring ntl wash to propel bolus and initiate swallow, premature spillage of thin liquid to the valleculae and pyriforms before swallow initiation, spillage of puree and mechanical soft mixed consistency to the valleculae before swallow initiation and penetration and aspiration of thin liquid before and during the swallow.  Aspiration is silent. Laryngeal elevation, epiglottic deflection, and forward hyolaryngeal movement are WFL. No remarkable cricopharyngeal or UES findings. There is no significant hypopharyngeal residue. Recommend a mechanical soft diet at this time w/ntl d/t above findings. ST to continue to follow for diet tolerance w/further recommendations as indicated.

## 2022-04-01 NOTE — THERAPY TREATMENT NOTE
Subjective: Pt agreeable to therapeutic plan of care.  Cognition: oriented to Person, Place and Situation    Objective:     Bed Mobility: Max-A, Assist x 2 and Dependent  Functional Transfers: N/A or Not attempted.  Functional Ambulation: N/A or Not attempted.    Grooming and Feeding: Max-A  ADL Position: edge of bed sitting and sitting up in bed  ADL Comments: While sitting EOB after supine to sit with max-dependent assist x2, patient performed hair care routine with a comb. He required mod to max assist, while PT provided trunk support ranging from CGA to MAX A. He was able to sit one bout for 20 seconds without support. Post EOB, patient was assisted back to supine where he was brought to seated position. He was fed ice cream with dependent assist secondary to lack of right arm movement        Pain: 3 VAS  Education: Provided education on importance of mobility and skilled verbal / tactile cueing throughout intervention.     Assessment: Benson Mclean presents with ADL impairments below baseline abilities which indicate the need for continued skilled intervention while inpatient. Patient very motivated to sit up today. He continues to require assist x2 (MAX-Dependent) for bed mobility. Sitting EOB ranged from CGA-MAX A. Right sided weakness continues to be a barrier, as does lack of activity tolerance. 4L O2 with no episodes of desatting noted. Tolerating session today without incident. Will continue to follow and progress as tolerated.     Plan/Recommendations:   Pt would benefit from Skilled Rehab placement at discharge from facility.   Pt desires Skilled Rehab placement at discharge. Pt cooperative; agreeable to therapeutic recommendations and plan of care.     Modified Fredy: 5 = Severe disability (Requires constant nursing care and attention, bedridden, incontinent)    Post-Tx Position: Supine with HOB Elevated, Alarms activated and Call light and personal items within reach  PPE: gloves, surgical mask,  eyewear protection

## 2022-04-01 NOTE — PLAN OF CARE
Goal Outcome Evaluation:      Pt went to dialysis this morning and had 3L taken off. Pt also had swallow study and was given a diet. Blood sugar was in 60's prior to pt getting his tray and was given thickened apple juice. Pt fed at bedside and ate 25% of his meal. CT placement never added to LDA chest tube output is currently at 1200cc. Site is clean dry and intact. VSS with no complaints.    Problem: Adult Inpatient Plan of Care  Goal: Plan of Care Review  Outcome: Ongoing, Progressing

## 2022-04-01 NOTE — CASE MANAGEMENT/SOCIAL WORK
Continued Stay Note   Brennan     Patient Name: Benson Mclean  MRN: [id number]  Today's Date: 4/1/2022    Admit Date: 3/29/2022     Discharge Plan     Row Name 04/01/22 1443       Plan    Plan DC: Referral to [facility], pending acceptance. Precert required. [address] is original Dialysis clinic. MWF, transportation per Medicaid.    Plan Comments O2 down to 4L pnc. HD today, swallow study pending.            Chart review only.                 Luis Daniel Wolff RN

## 2022-04-01 NOTE — PLAN OF CARE
Benson Mclean presents with functional mobility impairments which indicate the need for skilled intervention. Tolerating session today without incident. Pt motivated to sit EOB this date.  Pt continues to require maxAx2-depAx2 due to weakness and fatigue.  Unable to attempt transfer due to weakness.  Will continue to follow and progress as tolerated.     Plan/Recommendations:   Pt would benefit from Skilled Rehab placement at discharge from facility and requires no DME at discharge.   Pt desires Skilled Rehab placement at discharge. Pt cooperative; agreeable to therapeutic recommendations and plan of care.
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
## 2022-04-01 NOTE — PROGRESS NOTES
Daily Progress Note        Acute on chronic systolic congestive heart failure (HCC)    S/P CABG (coronary artery bypass graft)    Essential hypertension    Status post coronary artery stent placement    ANNETTE treated with BiPAP    Major depressive disorder, recurrent, mild (HCC)    Mixed hyperlipidemia    History of cerebrovascular accident    History of amputation of lesser toe (HCC)    Flaccid hemiplegia of right dominant side as late effect of cerebral infarction (HCC)    Diabetic neuropathy (HCC)    Unspecified dementia with behavioral disturbance (HCC)    Coronary artery disease    Obesity    Vitamin D deficiency    Chronic venous hypertension (idiopathic) with ulcer and inflammation of bilateral lower extremity (HCC)    Longstanding persistent atrial fibrillation (HCC)    Type 2 diabetes mellitus with hyperglycemia (HCC)    Anemia of renal disease    End stage renal disease (HCC)    Dependence on intermittent renal dialysis (HCC)    Congestive heart failure, unspecified HF chronicity, unspecified heart failure type (HCC)    Obesity (BMI 30-39.9)    Acute CVA (cerebrovascular accident) (HCC)    Hypothyroidism (acquired)    Vitamin B12 deficiency    Dyspnea, unspecified type      Assessment  Chronic large right pleural effusion  Acute on chronic systolic CHF- Elevated proBNP> 70,000.0     COPD  ANNETTE-noncompliant with CPAP  Cardiomyopathy  Pulmonary hypertension  End-stage renal disease-HD M/W/F  HTN  Anemia due to chronic kidney disease  Type 2 diabetes  CAD-S/P CABG  HLD  Chronic A. fib  Hypothyroidism  Tremors  Depression  Dementia  Vitamin B12 and D deficiencies  Obesity  History of cerebral hemorrhage-residual flaccid hemiplegia right side with vision loss  Cytokine release syndrome, grade 1     Last echo 3/13/2022  -EF 26-30% with severely decreased LV systolic function  -RVSP 50  -Right ventricular cavity is mild to moderately dilated  -Moderate left pleural effusion noted     3/29/2022 respiratory panel was  negative     3/30/2022 ABG at 1:30 AM on HF NC-pH 7.23, CO2 58, PO2 77, bicarb 24    Plan    Chest tube management  -CXR daily  -20 cm suction  ->500 mL out since yesterday     Continue to titrate oxygen- Currently on  3L NC and BiPAP as needed  Lasix 40 mg IV every 8 hours  Midodrine 10 mg prior to dialysis  Pulmicort nebulizer 2 times a day  DuoNebs 3 times a day  Electrolyte/Glycemic control  Speech therapy following  Protonix     4/1/2022                                                                  3/31/22       LOS: 3 days     Subjective   Patient reports breathing better today    Objective     Vital signs for last 24 hours:  Vitals:    04/01/22 0654 04/01/22 0659 04/01/22 0700 04/01/22 0705   BP:       BP Location:       Patient Position:       Pulse: 88 86 77 77   Resp: 18 18 18 18   Temp:       TempSrc:       SpO2: 96% 98% 98% 98%   Weight:       Height:           Intake/Output last 3 shifts:  No intake/output data recorded.  Intake/Output this shift:  No intake/output data recorded.      Radiology  Imaging Results (Last 24 Hours)     Procedure Component Value Units Date/Time    XR Chest 1 View [[id number]] Collected: 04/01/22 0715     Updated: 04/01/22 0718    Narrative:      EXAM: XR CHEST 1 VW-     DATE OF EXAM: 4/1/2022 1:33 AM     INDICATION: Chest tube; R06.00-Dyspnea, unspecified; I50.9-Heart  failure, unspecified; J96.01-Acute respiratory failure with hypoxia;  J96.02-Acute respiratory failure with hypercapnia.       COMPARISONS: 3/31/2022      FINDINGS:     Right-sided chest tube is unchanged. No pneumothorax. Unchanged left  pleural effusion with adjacent atelectasis. Mediastinal shadows are  unchanged..       Impression:         Stable chest over the past 24 hours.     Unchanged left pleural effusion with adjacent atelectasis.     No evidence of acute process of the right thorax with chest tube in  place. No pneumothorax.     Electronically Signed By-Zaire Irving On:4/1/2022 7:16 AM  This report  was finalized on [id number] by  Zaire Irving, .          Labs:  Results from last 7 days   Lab Units 04/01/22  0824   WBC 10*3/mm3 6.90   HEMOGLOBIN g/dL 11.6*   HEMATOCRIT % 35.3*   PLATELETS 10*3/mm3 166     Results from last 7 days   Lab Units 04/01/22  0442   SODIUM mmol/L 133*   POTASSIUM mmol/L 4.4   CHLORIDE mmol/L 100   CO2 mmol/L 19.0*   BUN mg/dL 28*   CREATININE mg/dL 3.38*   CALCIUM mg/dL 8.1*   GLUCOSE mg/dL 125*     Results from last 7 days   Lab Units 03/30/22  0123   PH, ARTERIAL pH units 7.232*   PO2 ART mm Hg 77.7*   PCO2, ARTERIAL mm Hg 58.2*   HCO3 ART mmol/L 24.5                                   Meds:   SCHEDULE  apixaban, 5 mg, Oral, Q12H  atorvastatin, 40 mg, Oral, Nightly  budesonide, 0.5 mg, Nebulization, BID  docusate sodium, 100 mg, Oral, Daily  donepezil, 10 mg, Oral, Nightly  guaiFENesin, 600 mg, Oral, BID  insulin lispro, 0-14 Units, Subcutaneous, Q6H  ipratropium-albuterol, 3 mL, Nebulization, 4x Daily - RT  levothyroxine, 50 mcg, Oral, Q AM  metoprolol tartrate, 37.5 mg, Oral, BID  midodrine, 10 mg, Oral, TID AC  pantoprazole, 40 mg, Oral, QAM AC  primidone, 25 mg, Oral, Daily  sertraline, 50 mg, Oral, Daily      Infusions     PRNs  •  albumin human  •  dextrose  •  dextrose  •  glucagon (human recombinant)  •  heparin (porcine)  •  insulin lispro **AND** insulin lispro  •  [COMPLETED] Insert peripheral IV **AND** sodium chloride    Physical Exam:  Physical Exam  General Appearance:  Alert.  No distress noted.  HEENT:  Normocephalic, without obvious abnormality, Conjunctiva/corneas clear,.  Normal external ear canals, Nares normal, no drainage     Neck:  Supple, symmetrical, trachea midline. No JVD.  Lungs /Chest wall:   Minimal basal Rales, diminished right base, respirations unlabored symmetrical wall movement.     Right chest tube in place  Heart:  Regular rate and rhythm, systolic murmur PMI left sternal border  Abdomen: Soft, non-tender, no masses, no organomegaly.     Extremities: No edema, no clubbing or cyanosis. right-sided weakness.      ROS  Review of Systems  Constitutional: Negative for chills, fever and malaise. positive for fatigue.   HENT: Negative.    Eyes: Negative.    Cardiovascular: Negative.    Respiratory: Positive for cough and shortness of breath, improving  Skin: Negative.    Musculoskeletal: Hemiplegia on right side  Gastrointestinal: Negative.    Genitourinary: Negative.    Neurological: Negative.    Psychiatric/Behavioral: Negative.          I have reviewed current clinicals.     Electronically signed by FABIOLA Brady, 04/01/22, 11:01 AM EDT.     The NP scribed the note for me, I have examined the patient and reviewed and verified the above findings and and I formulated the assessment and plan as documented

## 2022-04-01 NOTE — PLAN OF CARE
Goal Outcome Evaluation:      Pt remained on 4L throughout the night. BP remaining systolic 82-92. Blood sugar dropped to 64. Pt to have dialysis and swallow study today. Will continue to monitor  Problem: Adult Inpatient Plan of Care  Goal: Absence of Hospital-Acquired Illness or Injury  Intervention: Identify and Manage Fall Risk  Recent Flowsheet Documentation  Taken 4/1/2022 0414 by Manisha Mondragon RN  Safety Promotion/Fall Prevention: safety round/check completed  Taken 4/1/2022 0229 by Manisha Mondragon RN  Safety Promotion/Fall Prevention: safety round/check completed  Taken 4/1/2022 0025 by Manisha Mondragon RN  Safety Promotion/Fall Prevention: safety round/check completed  Taken 3/31/2022 2245 by Manisha Mondragon RN  Safety Promotion/Fall Prevention: safety round/check completed  Taken 3/31/2022 2030 by Manisha Mondragon RN  Safety Promotion/Fall Prevention: safety round/check completed  Intervention: Prevent Skin Injury  Recent Flowsheet Documentation  Taken 4/1/2022 0414 by Manisha Mondragon RN  Skin Protection: adhesive use limited  Taken 4/1/2022 0025 by Manisha Mondragon RN  Skin Protection: adhesive use limited  Intervention: Prevent and Manage VTE (Venous Thromboembolism) Risk  Recent Flowsheet Documentation  Taken 4/1/2022 0414 by Manisha Mondragon RN  Activity Management: bedrest  Taken 4/1/2022 0025 by Manisha Mondragon RN  Activity Management: bedrest  Taken 3/31/2022 2030 by Manisha Mondragon RN  Activity Management: bedrest     Problem: Fall Injury Risk  Goal: Absence of Fall and Fall-Related Injury  Intervention: Promote Injury-Free Environment  Recent Flowsheet Documentation  Taken 4/1/2022 0414 by Manisha Mondragon RN  Safety Promotion/Fall Prevention: safety round/check completed  Taken 4/1/2022 0229 by Manisha Mondragon RN  Safety Promotion/Fall Prevention: safety round/check completed  Taken 4/1/2022 0025 by Manisha Mondragon RN  Safety  Promotion/Fall Prevention: safety round/check completed  Taken 3/31/2022 2245 by Manisha Mondragon RN  Safety Promotion/Fall Prevention: safety round/check completed  Taken 3/31/2022 2030 by Manisha Mondragon RN  Safety Promotion/Fall Prevention: safety round/check completed     Problem: COPD (Chronic Obstructive Pulmonary Disease) Comorbidity  Goal: Maintenance of COPD Symptom Control  Intervention: Maintain COPD-Symptom Control  Recent Flowsheet Documentation  Taken 4/1/2022 0414 by Manisha Mondragon RN  Supportive Measures: active listening utilized     Problem: Diabetes Comorbidity  Goal: Blood Glucose Level Within Targeted Range  Intervention: Monitor and Manage Glycemia  Recent Flowsheet Documentation  Taken 4/1/2022 0414 by Manisha Mondragon RN  Glycemic Management: blood glucose monitored  Taken 4/1/2022 0025 by Manisha Mondragon RN  Glycemic Management: blood glucose monitored  Taken 3/31/2022 2030 by Manisha Mondragon RN  Glycemic Management: blood glucose monitored     Problem: Pain Chronic (Persistent) (Comorbidity Management)  Goal: Acceptable Pain Control and Functional Ability  Intervention: Optimize Psychosocial Wellbeing  Recent Flowsheet Documentation  Taken 4/1/2022 0414 by Manisha Mondragon RN  Supportive Measures: active listening utilized  Diversional Activities: television  Taken 3/31/2022 2030 by Manisha Mondragon RN  Diversional Activities: television     Problem: Skin Injury Risk Increased  Goal: Skin Health and Integrity  Intervention: Optimize Skin Protection  Recent Flowsheet Documentation  Taken 4/1/2022 0414 by Manisha Mondragon RN  Pressure Reduction Techniques: weight shift assistance provided  Pressure Reduction Devices: positioning supports utilized  Skin Protection: adhesive use limited  Taken 4/1/2022 0025 by Manisha Mondragon RN  Pressure Reduction Techniques: weight shift assistance provided  Skin Protection: adhesive use limited

## 2022-04-02 NOTE — PROGRESS NOTES
Daily Progress Note        Acute on chronic systolic congestive heart failure (HCC)    S/P CABG (coronary artery bypass graft)    Essential hypertension    Status post coronary artery stent placement    ANNETTE treated with BiPAP    Major depressive disorder, recurrent, mild (HCC)    Mixed hyperlipidemia    History of cerebrovascular accident    History of amputation of lesser toe (HCC)    Flaccid hemiplegia of right dominant side as late effect of cerebral infarction (HCC)    Diabetic neuropathy (HCC)    Unspecified dementia with behavioral disturbance (HCC)    Coronary artery disease    Obesity    Vitamin D deficiency    Chronic venous hypertension (idiopathic) with ulcer and inflammation of bilateral lower extremity (HCC)    Longstanding persistent atrial fibrillation (HCC)    Type 2 diabetes mellitus with hyperglycemia (HCC)    Anemia of renal disease    End stage renal disease (HCC)    Dependence on intermittent renal dialysis (HCC)    Congestive heart failure, unspecified HF chronicity, unspecified heart failure type (HCC)    Obesity (BMI 30-39.9)    Acute CVA (cerebrovascular accident) (HCC)    Hypothyroidism (acquired)    Vitamin B12 deficiency    Dyspnea, unspecified type      Assessment  Chronic large right pleural effusion  Acute on chronic systolic CHF- Elevated proBNP> 70,000.0     COPD  ANNETTE-noncompliant with CPAP  Cardiomyopathy  Pulmonary hypertension  End-stage renal disease-HD M/W/F  HTN  Anemia due to chronic kidney disease  Type 2 diabetes  CAD-S/P CABG  HLD  Chronic A. fib  Hypothyroidism  Tremors  Depression  Dementia  Vitamin B12 and D deficiencies  Obesity  History of cerebral hemorrhage-residual flaccid hemiplegia right side with vision loss  Cytokine release syndrome, grade 1     Last echo 3/13/2022  -EF 26-30% with severely decreased LV systolic function  -RVSP 50  -Right ventricular cavity is mild to moderately dilated  -Moderate left pleural effusion noted     3/29/2022 respiratory panel was  negative     3/30/2022 ABG at 1:30 AM on HF NC-pH 7.23, CO2 58, PO2 77, bicarb 24    Plan    Chest tube management  -CXR daily  -Change to waterseal  - >300 mL out since yesterday     Continue to titrate oxygen- Currently on  3L NC and BiPAP as needed  Lasix 40 mg IV every 8 hours  Mucinex  Midodrine 10 mg 3 times a day   Pulmicort nebulizer 2 times a day  DuoNebs 3 times a day  Synthroid 50 mcg  Electrolyte/Glycemic control  Speech therapy following  Anticoagulation and GI prophylaxis-Eliquis and Protonix     4/2/2022 4/1/22       LOS: 4 days     Subjective   Patient reports breathing better today    Objective     Vital signs for last 24 hours:  Vitals:    04/02/22 0731 04/02/22 0733 04/02/22 0756 04/02/22 0952   BP:   93/49 92/40   BP Location:       Patient Position:       Pulse: 99 96 102 107   Resp: 18 18  12   Temp:    98.5 °F (36.9 °C)   TempSrc:    Oral   SpO2: 100%   99%   Weight:       Height:           Intake/Output last 3 shifts:  I/O last 3 completed shifts:  In: 700 [P.O.:600; IV Piggyback:100]  Out: 3050 [Other:3000; Chest Tube:50]  Intake/Output this shift:  No intake/output data recorded.      Radiology  Imaging Results (Last 24 Hours)     Procedure Component Value Units Date/Time    XR Chest 1 View [[id number]] Collected: 04/02/22 0943     Updated: 04/02/22 0946    Narrative:      EXAM: XR CHEST 1 VW-     DATE OF EXAM: 4/2/2022 6:31 AM     INDICATION: Chest tube; R06.00-Dyspnea, unspecified; I50.9-Heart  failure, unspecified; J96.01-Acute respiratory failure with hypoxia;  J96.02-Acute respiratory failure with hypercapnia.       COMPARISONS: 4/1/2022      FINDINGS:     Unchanged left pleural effusion with adjacent atelectasis. No  pneumothorax either side. Right-sided chest tube is unchanged.  Mediastinal shadows are unchanged..       Impression:         Stable chest over the past 24 hours. Unchanged left pleural effusion  with  adjacent atelectasis.     Right-sided chest tube is unchanged with pigtail in the mid right  thorax. No pneumothorax.     Electronically Signed By-Zaire Irivng On:4/2/2022 9:44 AM  This report was finalized on [id number] by  Zaire Irving, .    FL Video Swallow With Speech Single Contrast [[id number]] Collected: 04/01/22 1449     Updated: 04/01/22 1452    Narrative:      DATE OF EXAM:  4/1/2022 2:30 PM     PROCEDURE:  FL VIDEO SWALLOW W SPEECH SINGLE-CONTRAST-     INDICATIONS:  dysphagia; R06.00-Dyspnea, unspecified; I50.9-Heart failure,  unspecified; J96.01-Acute respiratory failure with hypoxia; J96.02-Acute  respiratory failure with hypercapnia     COMPARISON:  No Comparisons Available     TECHNIQUE:   This examination was performed in conjunction with speech pathology.  Lateral video fluoroscopic evaluation of the swallowing mechanism was  performed while correlate administering to the patient various  consistency food items mixed with barium.     Fluoroscopic Time:   2.9 minutes     FINDINGS:  With the speech pathologist present, the patient ingested various  consistencies of barium. The case was discussed during the exam. Speech  pathologist notes and recommendations reflect the discussions.        Impression:      Technically successful modified barium swallow     Electronically Signed By-Davis Dee MD On:4/1/2022 2:50 PM  This report was finalized on [id number] by  Davis Dee MD.          Labs:  Results from last 7 days   Lab Units 04/02/22  0516   WBC 10*3/mm3 5.80   HEMOGLOBIN g/dL 10.7*   HEMATOCRIT % 33.0*   PLATELETS 10*3/mm3 126*     Results from last 7 days   Lab Units 04/02/22  0652   SODIUM mmol/L 135*   POTASSIUM mmol/L 4.1   CHLORIDE mmol/L 101   CO2 mmol/L 22.0   BUN mg/dL 22   CREATININE mg/dL 2.77*   CALCIUM mg/dL 8.4*   GLUCOSE mg/dL 148*     Results from last 7 days   Lab Units 03/30/22  0123   PH, ARTERIAL pH units 7.232*   PO2 ART mm Hg 77.7*   PCO2, ARTERIAL mm Hg 58.2*   HCO3 ART  mmol/L 24.5                                   Meds:   SCHEDULE  apixaban, 5 mg, Oral, Q12H  atorvastatin, 40 mg, Oral, Nightly  Barium Sulfate, 1 teaspoon(s), Oral, Once in imaging  budesonide, 0.5 mg, Nebulization, BID  docusate sodium, 100 mg, Oral, Daily  donepezil, 10 mg, Oral, Nightly  guaiFENesin, 600 mg, Oral, BID  insulin lispro, 0-14 Units, Subcutaneous, 4x Daily With Meals & Nightly  ipratropium-albuterol, 3 mL, Nebulization, TID - RT  levothyroxine, 50 mcg, Oral, Q AM  metoprolol tartrate, 37.5 mg, Oral, BID  midodrine, 10 mg, Oral, TID AC  pantoprazole, 40 mg, Oral, QAM AC  primidone, 25 mg, Oral, Daily  sertraline, 50 mg, Oral, Daily      Infusions     PRNs  dextrose  •  dextrose  •  glucagon (human recombinant)  •  heparin (porcine)  •  insulin lispro **AND** insulin lispro  •  [COMPLETED] Insert peripheral IV **AND** sodium chloride    Physical Exam:  Physical Exam  General Appearance:  Alert.  No distress noted.  HEENT:  Normocephalic, without obvious abnormality, Conjunctiva/corneas clear,.  Normal external ear canals, Nares normal, no drainage     Neck:  Supple, symmetrical, trachea midline. No JVD.  Lungs /Chest wall:   Minimal basal Rales, diminished right base, respirations unlabored symmetrical wall movement.     Right chest tube in place  Heart:  Regular rate and rhythm, systolic murmur PMI left sternal border  Abdomen: Soft, non-tender, no masses, no organomegaly.    Extremities: No edema, no clubbing or cyanosis. right-sided weakness.      ROS  Review of Systems  Constitutional: Negative for chills, fever and malaise. positive for fatigue.   HENT: Negative.    Eyes: Negative.    Cardiovascular: Negative.    Respiratory: Positive for cough and shortness of breath, improving  Skin: Negative.    Musculoskeletal: Hemiplegia on right side  Gastrointestinal: Negative.    Genitourinary: Negative.    Neurological: Negative.    Psychiatric/Behavioral: Negative.          I have reviewed current  clinicals.     Electronically signed by FABIOLA Brady, 04/02/22, 10:35 AM EDT.     The NP scribed the note for me, I have examined the patient and reviewed and verified the above findings and and I formulated the assessment and plan as documented

## 2022-04-02 NOTE — PLAN OF CARE
Problem: Adult Inpatient Plan of Care  Goal: Plan of Care Review  Outcome: Ongoing, Progressing  Goal: Patient-Specific Goal (Individualized)  Outcome: Ongoing, Progressing  Goal: Absence of Hospital-Acquired Illness or Injury  Outcome: Ongoing, Progressing  Intervention: Identify and Manage Fall Risk  Recent Flowsheet Documentation  Taken 4/2/2022 0000 by Ramya Villanueva RN  Safety Promotion/Fall Prevention:   safety round/check completed   room organization consistent   nonskid shoes/slippers when out of bed   assistive device/personal items within reach   clutter free environment maintained   fall prevention program maintained  Taken 4/1/2022 2200 by Ramya Villanueva RN  Safety Promotion/Fall Prevention:   safety round/check completed   room organization consistent   patient off unit   nonskid shoes/slippers when out of bed  Taken 4/1/2022 2000 by Ramya Villanueva RN  Safety Promotion/Fall Prevention:   safety round/check completed   room organization consistent   nonskid shoes/slippers when out of bed   fall prevention program maintained  Intervention: Prevent and Manage VTE (Venous Thromboembolism) Risk  Recent Flowsheet Documentation  Taken 4/2/2022 0000 by Ramya Villanueva RN  VTE Prevention/Management: bleeding risk factor(s) identified  Taken 4/1/2022 2000 by Ramya Villanueva RN  VTE Prevention/Management: bleeding risk factor(s) identified  Intervention: Prevent Infection  Recent Flowsheet Documentation  Taken 4/2/2022 0000 by Ramya Villanueva RN  Infection Prevention: single patient room provided  Taken 4/1/2022 2200 by Ramya Villanueva RN  Infection Prevention:   single patient room provided   hand hygiene promoted   personal protective equipment utilized   equipment surfaces disinfected  Taken 4/1/2022 2000 by Ramya Villanueva RN  Infection Prevention:   single patient room provided   personal protective equipment utilized   hand hygiene promoted   equipment surfaces  disinfected  Goal: Optimal Comfort and Wellbeing  Outcome: Ongoing, Progressing  Intervention: Provide Person-Centered Care  Recent Flowsheet Documentation  Taken 4/1/2022 2000 by Ramya Villanueva RN  Trust Relationship/Rapport:   care explained   questions answered  Goal: Readiness for Transition of Care  Outcome: Ongoing, Progressing     Problem: Fall Injury Risk  Goal: Absence of Fall and Fall-Related Injury  Outcome: Ongoing, Progressing  Intervention: Identify and Manage Contributors  Recent Flowsheet Documentation  Taken 4/2/2022 0000 by Ramya Villanueva RN  Medication Review/Management: medications reviewed  Taken 4/1/2022 2200 by Ramya Villanueva RN  Medication Review/Management: medications reviewed  Taken 4/1/2022 2000 by Ramya Villanueva RN  Medication Review/Management: medications reviewed  Intervention: Promote Injury-Free Environment  Recent Flowsheet Documentation  Taken 4/2/2022 0000 by Ramya Villanueva RN  Safety Promotion/Fall Prevention:   safety round/check completed   room organization consistent   nonskid shoes/slippers when out of bed   assistive device/personal items within reach   clutter free environment maintained   fall prevention program maintained  Taken 4/1/2022 2200 by Ramya Villanueva RN  Safety Promotion/Fall Prevention:   safety round/check completed   room organization consistent   patient off unit   nonskid shoes/slippers when out of bed  Taken 4/1/2022 2000 by Ramya Villanueva RN  Safety Promotion/Fall Prevention:   safety round/check completed   room organization consistent   nonskid shoes/slippers when out of bed   fall prevention program maintained     Problem: COPD (Chronic Obstructive Pulmonary Disease) Comorbidity  Goal: Maintenance of COPD Symptom Control  Outcome: Ongoing, Progressing  Intervention: Maintain COPD-Symptom Control  Recent Flowsheet Documentation  Taken 4/2/2022 0000 by Ramya Villanueva RN  Medication Review/Management: medications  reviewed  Taken 4/1/2022 2200 by Ramya Villanueva RN  Medication Review/Management: medications reviewed  Taken 4/1/2022 2000 by Ramya Villanueva RN  Medication Review/Management: medications reviewed     Problem: Diabetes Comorbidity  Goal: Blood Glucose Level Within Targeted Range  Outcome: Ongoing, Progressing  Intervention: Monitor and Manage Glycemia  Recent Flowsheet Documentation  Taken 4/1/2022 2000 by Ramya Villanueva RN  Glycemic Management: blood glucose monitored     Problem: Heart Failure Comorbidity  Goal: Maintenance of Heart Failure Symptom Control  Outcome: Ongoing, Progressing  Intervention: Maintain Heart Failure-Management  Recent Flowsheet Documentation  Taken 4/2/2022 0000 by Ramya Villanueva RN  Medication Review/Management: medications reviewed  Taken 4/1/2022 2200 by Ramya Villanueva RN  Medication Review/Management: medications reviewed  Taken 4/1/2022 2000 by Ramya Villanueva RN  Medication Review/Management: medications reviewed     Problem: Hypertension Comorbidity  Goal: Blood Pressure in Desired Range  Outcome: Ongoing, Progressing  Intervention: Maintain Blood Pressure Management  Recent Flowsheet Documentation  Taken 4/2/2022 0000 by Ramya Villanueva RN  Medication Review/Management: medications reviewed  Taken 4/1/2022 2200 by Ramya Villanueva RN  Medication Review/Management: medications reviewed  Taken 4/1/2022 2000 by Ramya Villanueva RN  Medication Review/Management: medications reviewed     Problem: Pain Chronic (Persistent) (Comorbidity Management)  Goal: Acceptable Pain Control and Functional Ability  Outcome: Ongoing, Progressing  Intervention: Manage Persistent Pain  Recent Flowsheet Documentation  Taken 4/2/2022 0000 by Ramya Villanueva RN  Medication Review/Management: medications reviewed  Taken 4/1/2022 2200 by Ramya Villanueva RN  Medication Review/Management: medications reviewed  Taken 4/1/2022 2000 by Ramya Villanueva  RN  Medication Review/Management: medications reviewed  Intervention: Optimize Psychosocial Wellbeing  Recent Flowsheet Documentation  Taken 4/1/2022 2000 by Ramya Villanueva, RN  Family/Support System Care: self-care encouraged     Problem: Skin Injury Risk Increased  Goal: Skin Health and Integrity  Outcome: Ongoing, Progressing   Goal Outcome Evaluation:

## 2022-04-02 NOTE — PROGRESS NOTES
[facility] Medicine Services Daily Progress Note    Patient Name: Benson Mclean  : 1950  MRN: [id number]  Primary Care Physician:  Rudolph Rooney MD  Date of admission: 3/29/2022      Subjective      Chief Complaint: Shortness of breath    Patient seen and examined this morning.  About the same, breathing continues to improve. No other complaints.    Pertinent positives as noted in HPI/subjective.  All other systems were reviewed and are negative.      Objective      Vitals:   Temp:  [96.9 °F (36.1 °C)-98.5 °F (36.9 °C)] 98.5 °F (36.9 °C)  Heart Rate:  [3-107] 107  Resp:  [12-18] 12  BP: ()/(34-61) 92/40  Flow (L/min):  [3-4] 3    Physical Exam:    General: Awake and alert, lying in bed, chronically ill-appearing, NAD  Eyes: PERRL, EOMI, conjunctive are clear  Cardiovascular: Regular rate and rhythm, no murmurs  Respiratory: Decreased breath sounds bilaterally, improving, no wheezing, unlabored breathing  Abdomen: Soft, obese, nontender, positive bowel sounds, no guarding  Neurologic: A&O, CN grossly intact, moves all extremities spontaneously  Musculoskeletal: Generalized weakness with right greater than left noted, no gross deformities  Skin: Warm, dry, intact         Result Review    Result Review:  I have personally reviewed the results from the time of this admission to 2022 10:02 EDT and agree with these findings:  [x]  Laboratory  [x]  Microbiology  [x]  Radiology  [x]  EKG/Telemetry   []  Cardiology/Vascular   []  Pathology  []  Old records  []  Other:    Wounds (last 24 hours)     LDA Wound     Row Name 22 0400 22 0000 22       Wound 22 0340 perineum Pressure Injury    Wound - Properties Group Placement Date: 22  -AS Placement Time: 340  -AS Present on Hospital Admission: Y  -AS Location: perineum  -AS Primary Wound Type: Pressure inj  -AS    Pressure Injury Stage -- -- 1  -DM    Dressing Appearance -- -- open to air  -DM     Drainage Amount none  -DM none  -DM none  -DM    Dressing Care -- -- open to air  -DM    Retired Wound - Properties Group Placement Date: 03/30/22  -AS Placement Time: 0340  -AS Present on Hospital Admission: Y  -AS Location: perineum  -AS Primary Wound Type: Pressure inj  -AS    Retired Wound - Properties Group Date first assessed: 03/30/22  -AS Time first assessed: 0340  -AS Present on Hospital Admission: Y  -AS Location: perineum  -AS Primary Wound Type: Pressure inj  -AS       Wound 03/30/22 0341 Left posterior thigh    Wound - Properties Group Placement Date: 03/30/22  -AS Placement Time: 0341  -AS Present on Hospital Admission: Y  -AS Side: Left  -AS Orientation: posterior  -AS Location: thigh  -AS    Pressure Injury Stage -- -- 1  -DM    Dressing Appearance -- -- open to air  -DM    Drainage Amount none  -DM none  -DM none  -DM    Dressing Care -- -- open to air  -DM    Retired Wound - Properties Group Placement Date: 03/30/22  -AS Placement Time: 0341  -AS Present on Hospital Admission: Y  -AS Side: Left  -AS Orientation: posterior  -AS Location: thigh  -AS    Retired Wound - Properties Group Date first assessed: 03/30/22  -AS Time first assessed: 0341  -AS Present on Hospital Admission: Y  -AS Side: Left  -AS Location: thigh  -AS       Wound 03/30/22 0342 Right popliteal    Wound - Properties Group Placement Date: 03/30/22  -AS Placement Time: 0342  -AS Present on Hospital Admission: Y  -AS Side: Right  -AS Location: popliteal  -AS    Dressing Appearance -- -- open to air  -DM    Drainage Amount none  -DM none  -DM none  -DM    Dressing Care -- -- open to air  -DM    Retired Wound - Properties Group Placement Date: 03/30/22  -AS Placement Time: 0342  -AS Present on Hospital Admission: Y  -AS Side: Right  -AS Location: popliteal  -AS    Retired Wound - Properties Group Date first assessed: 03/30/22  -AS Time first assessed: 0342  -AS Present on Hospital Admission: Y  -AS Side: Right  -AS Location: popliteal   -AS       Wound 03/30/22 0343 Left heel    Wound - Properties Group Placement Date: 03/30/22  -AS Placement Time: 0343  -AS Present on Hospital Admission: Y  -AS Side: Left  -AS Location: heel  -AS    Pressure Injury Stage -- -- 1  -DM    Dressing Appearance -- -- open to air  -DM    Drainage Amount none  -DM none  -DM none  -DM    Dressing Care -- -- open to air  -DM    Retired Wound - Properties Group Placement Date: 03/30/22  -AS Placement Time: 0343  -AS Present on Hospital Admission: Y  -AS Side: Left  -AS Location: heel  -AS    Retired Wound - Properties Group Date first assessed: 03/30/22  -AS Time first assessed: 0343  -AS Present on Hospital Admission: Y  -AS Side: Left  -AS Location: heel  -AS       Wound 03/30/22 0348 Right anterior ankle    Wound - Properties Group Placement Date: 03/30/22  -AS Placement Time: 0348  -AS Present on Hospital Admission: Y  -AS Side: Right  -AS Orientation: anterior  -AS Location: ankle  -AS    Dressing Appearance -- -- open to air  -DM    Drainage Amount none  -DM none  -DM none  -DM    Dressing Care -- -- open to air  -DM    Retired Wound - Properties Group Placement Date: 03/30/22  -AS Placement Time: 0348  -AS Present on Hospital Admission: Y  -AS Side: Right  -AS Orientation: anterior  -AS Location: ankle  -AS    Retired Wound - Properties Group Date first assessed: 03/30/22  -AS Time first assessed: 0348  -AS Present on Hospital Admission: Y  -AS Side: Right  -AS Location: ankle  -AS       Wound 03/30/22 0349 Left antecubital    Wound - Properties Group Placement Date: 03/30/22  -AS Placement Time: 0349  -AS Present on Hospital Admission: Y  -AS Side: Left  -AS Location: antecubital  -AS    Dressing Appearance -- -- open to air  -DM    Drainage Amount none  -DM none  -DM none  -DM    Dressing Care -- -- open to air  -DM    Retired Wound - Properties Group Placement Date: 03/30/22  -AS Placement Time: 0349  -AS Present on Hospital Admission: Y  -AS Side: Left  -AS  Location: antecubital  -AS    Retired Wound - Properties Group Date first assessed: 03/30/22  -AS Time first assessed: 0349  -AS Present on Hospital Admission: Y  -AS Side: Left  -AS Location: antecubital  -AS    Row Name 04/01/22 1600 04/01/22 1327          Wound 03/30/22 0340 perineum Pressure Injury    Wound - Properties Group Placement Date: 03/30/22  -AS Placement Time: 0340  -AS Present on Hospital Admission: Y  -AS Location: perineum  -AS Primary Wound Type: Pressure inj  -AS     Dressing Appearance open to air  -MS open to air  -MS     Drainage Amount none  -MS none  -MS     Dressing Care open to air  -MS open to air  -MS     Periwound Care dry periwound area maintained  -MS dry periwound area maintained  -MS     Retired Wound - Properties Group Placement Date: 03/30/22  -AS Placement Time: 0340  -AS Present on Hospital Admission: Y  -AS Location: perineum  -AS Primary Wound Type: Pressure inj  -AS     Retired Wound - Properties Group Date first assessed: 03/30/22  -AS Time first assessed: 0340  -AS Present on Hospital Admission: Y  -AS Location: perineum  -AS Primary Wound Type: Pressure inj  -AS            Wound 03/30/22 0341 Left posterior thigh    Wound - Properties Group Placement Date: 03/30/22  -AS Placement Time: 0341  -AS Present on Hospital Admission: Y  -AS Side: Left  -AS Orientation: posterior  -AS Location: thigh  -AS     Dressing Appearance open to air  -MS open to air  -MS     Drainage Amount none  -MS none  -MS     Dressing Care open to air  -MS open to air  -MS     Periwound Care dry periwound area maintained  -MS dry periwound area maintained  -MS     Retired Wound - Properties Group Placement Date: 03/30/22  -AS Placement Time: 0341  -AS Present on Hospital Admission: Y  -AS Side: Left  -AS Orientation: posterior  -AS Location: thigh  -AS     Retired Wound - Properties Group Date first assessed: 03/30/22  -AS Time first assessed: 0341  -AS Present on Hospital Admission: Y  -AS Side: Left   -AS Location: thigh  -AS            Wound 03/30/22 0342 Right popliteal    Wound - Properties Group Placement Date: 03/30/22  -AS Placement Time: 0342  -AS Present on Hospital Admission: Y  -AS Side: Right  -AS Location: popliteal  -AS     Dressing Appearance open to air  -MS open to air  -MS     Drainage Amount none  -MS none  -MS     Dressing Care open to air  -MS open to air  -MS     Periwound Care dry periwound area maintained  -MS dry periwound area maintained  -MS     Retired Wound - Properties Group Placement Date: 03/30/22  -AS Placement Time: 0342  -AS Present on Hospital Admission: Y  -AS Side: Right  -AS Location: popliteal  -AS     Retired Wound - Properties Group Date first assessed: 03/30/22  -AS Time first assessed: 0342  -AS Present on Hospital Admission: Y  -AS Side: Right  -AS Location: popliteal  -AS            Wound 03/30/22 0343 Left heel    Wound - Properties Group Placement Date: 03/30/22  -AS Placement Time: 0343  -AS Present on Hospital Admission: Y  -AS Side: Left  -AS Location: heel  -AS     Dressing Appearance open to air  -MS open to air  -MS     Drainage Amount none  -MS none  -MS     Dressing Care open to air  -MS open to air  -MS     Periwound Care dry periwound area maintained  -MS dry periwound area maintained  -MS     Retired Wound - Properties Group Placement Date: 03/30/22  -AS Placement Time: 0343  -AS Present on Hospital Admission: Y  -AS Side: Left  -AS Location: heel  -AS     Retired Wound - Properties Group Date first assessed: 03/30/22  -AS Time first assessed: 0343  -AS Present on Hospital Admission: Y  -AS Side: Left  -AS Location: heel  -AS            Wound 03/30/22 0348 Right anterior ankle    Wound - Properties Group Placement Date: 03/30/22  -AS Placement Time: 0348  -AS Present on Hospital Admission: Y  -AS Side: Right  -AS Orientation: anterior  -AS Location: ankle  -AS     Dressing Appearance open to air  -MS open to air  -MS     Drainage Amount none  -MS none  -MS      Dressing Care open to air  -MS open to air  -MS     Periwound Care dry periwound area maintained  -MS dry periwound area maintained  -MS     Retired Wound - Properties Group Placement Date: 03/30/22  -AS Placement Time: 0348  -AS Present on Hospital Admission: Y  -AS Side: Right  -AS Orientation: anterior  -AS Location: ankle  -AS     Retired Wound - Properties Group Date first assessed: 03/30/22  -AS Time first assessed: 0348  -AS Present on Hospital Admission: Y  -AS Side: Right  -AS Location: ankle  -AS            Wound 03/30/22 0349 Left antecubital    Wound - Properties Group Placement Date: 03/30/22  -AS Placement Time: 0349  -AS Present on Hospital Admission: Y  -AS Side: Left  -AS Location: antecubital  -AS     Dressing Appearance open to air  -MS open to air  -MS     Drainage Amount none  -MS none  -MS     Dressing Care open to air  -MS open to air  -MS     Periwound Care dry periwound area maintained  -MS dry periwound area maintained  -MS     Retired Wound - Properties Group Placement Date: 03/30/22  -AS Placement Time: 0349  -AS Present on Hospital Admission: Y  -AS Side: Left  -AS Location: antecubital  -AS     Retired Wound - Properties Group Date first assessed: 03/30/22  -AS Time first assessed: 0349  -AS Present on Hospital Admission: Y  -AS Side: Left  -AS Location: antecubital  -AS           User Key  (r) = Recorded By, (t) = Taken By, (c) = Cosigned By    Initials Name Provider Type    AS Maricruz Mi RN Registered Nurse    Misti Anders RN Registered Nurse    Ramya Hanks RN Registered Nurse                  Assessment/Plan      Brief Patient Summary:  Benson Mclean is a 71 y.o. 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    Patient is poor historian but states he has been feeling shortness of breath for past few days.  Unknown of last dialysis.  Was recently here for CHF exacerbation and was diagnosed with ischemic CVA at the time with mild hemorrhagic conversion.  Was discharged to rehab and plan for restarting Eliquis if repeat CT head is stable per neurology recommendations.  CT of the head on admission is stable.  On admission patient severely hypoxic and placed on high flow oxygen.  Imaging report noted to have large right-sided pleural effusion, pulmonology and nephrology consulted.  Chest tube placed by pulmonology on 3/30.  Transudate fluid with Cultures negative so far.      apixaban, 5 mg, Oral, Q12H  atorvastatin, 40 mg, Oral, Nightly  Barium Sulfate, 1 teaspoon(s), Oral, Once in imaging  budesonide, 0.5 mg, Nebulization, BID  docusate sodium, 100 mg, Oral, Daily  donepezil, 10 mg, Oral, Nightly  guaiFENesin, 600 mg, Oral, BID  insulin lispro, 0-14 Units, Subcutaneous, 4x Daily With Meals & Nightly  ipratropium-albuterol, 3 mL, Nebulization, TID - RT  levothyroxine, 50 mcg, Oral, Q AM  metoprolol tartrate, 37.5 mg, Oral, BID  midodrine, 10 mg, Oral, TID AC  pantoprazole, 40 mg, Oral, QAM AC  primidone, 25 mg, Oral, Daily  sertraline, 50 mg, Oral, Daily             Active Hospital Problems:  Active Hospital Problems    Diagnosis    • **Acute on chronic systolic congestive heart failure (HCC)    • Dyspnea, unspecified type    • Vitamin B12 deficiency    • Hypothyroidism (acquired)    • Acute CVA (cerebrovascular accident) (HCC)      Right occipital with residual vision loss     • Obesity (BMI 30-39.9)    • Congestive heart failure, unspecified HF chronicity, unspecified heart failure type (HCC)    • Dependence on intermittent renal  dialysis (HCC)    • End stage renal disease (HCC)    • Anemia of renal disease    • Type 2 diabetes mellitus with hyperglycemia (HCC)    • Mixed hyperlipidemia    • Obesity    • History of cerebrovascular accident    • Major depressive disorder, recurrent, mild (HCC)    • Flaccid hemiplegia of right dominant side as late effect of cerebral infarction (HCC)    • Longstanding persistent atrial fibrillation (HCC)    • Unspecified dementia with behavioral disturbance (HCC)    • Essential hypertension    • S/P CABG (coronary artery bypass graft)    • Chronic venous hypertension (idiopathic) with ulcer and inflammation of bilateral lower extremity (HCC)    • Coronary artery disease    • Vitamin D deficiency    • History of amputation of lesser toe (HCC)    • ANNETTE treated with BiPAP    • Diabetic neuropathy ([facility])    • Status post coronary artery stent placement      Plan:     Acute on chronic hypoxic and hypercapnic respiratory failure, improving  Large right-sided pleural effusion  Acute on chronic HFrEF  -Multifactorial with underlying ANNETTE, and CHF with morbid obesity  -Imaging reports noted  -s/p right chest tube placement by pulmonology on 3/30, transudate fluid noted  -Diuresis ultrafiltration per nephrology  -Patient does not make urine, Lasix discontinued  -Continue other home meds  -Weaned down to nasal cannula  -Pulmonology following, palliative care following, remains full code     ESRD on HD  -Ultrafiltration as tolerated  -Midodrine for blood pressure support  -Continue dialysis support per nephrology    Recent CVA  -Diagnosed during last admission, previous imaging reports and discharge summary noted  -Has some residual right-sided deficits  -Repeat CT head this admission is stable, no hemorrhage noted  -Per previous notes, plan was to start Eliquis for his A. fib if repeat CT is stable per neurology recommendations  -Started on Eliquis this admission    PAF  -Rate controlled currently, continue home  meds  -Eliquis started  -Monitor on telemetry     DM2  Dysphagia  -Patient much more awake and alert, s/p VFSS, on diet per ST  -Basal insulin stopped due to hypoglycemia, resume when able  -Continue sliding scale, monitor blood glucose  -Adjust as needed    Hypothyroidism  -Continue home meds     Major depressive disorder, recurrent, mild   -Continue home meds, monitor     Obesity (BMI 30-39.9)  BMI 36.92  -Lifestyle modifications to reduce cardiovascular risk factors    DVT ppx  -Eliquis       CODE STATUS:    Code Status (Patient has no pulse and is not breathing): CPR (Attempt to Resuscitate)  Medical Interventions (Patient has pulse or is breathing): Full Support      Disposition: Rehab placement, needs pre-CERT    Electronically signed by Ivan Tapia DO, 04/02/22, 10:02 EDT.  [facility] Hospitalist Team

## 2022-04-02 NOTE — PROGRESS NOTES
Nutrition Services    Patient Name: Benson Mclean  YOB: 1950  MRN: [id number]  Admission date: 3/29/2022    PPE Documentation        PPE Worn By Provider Did not enter room for this encounter   PPE Worn By Patient  N/A     PROGRESS NOTE      Encounter Information: Progress note to monitor for nutrition plan of care. PO diet advanced to [facility] soft with NTL.        PO Diet: Diet Texture; Mechanical Ground; Thickened Liquids (Nectar)   PO Supplements: None   PO Intake:  Two meals documented at 25%, 75%        Nutrition support orders: -   Nutrition support review: -       Labs (reviewed below): Reviewed-management per attending        GI Function:  No BM since admission (x 5 days)->RN alerted        Nutrition Intervention: Will monitor PO intakes and need to start oral nutritional supplements.        Results from last 7 days   Lab Units 04/02/22  0652 04/01/22  0442 03/31/22  0503   SODIUM mmol/L 135* 133* 136   POTASSIUM mmol/L 4.1 4.4 4.2   CHLORIDE mmol/L 101 100 101   CO2 mmol/L 22.0 19.0* 24.0   BUN mg/dL 22 28* 20   CREATININE mg/dL 2.77* 3.38* 2.75*   CALCIUM mg/dL 8.4* 8.1* 8.3*   GLUCOSE mg/dL 148* 125* 97     Results from last 7 days   Lab Units 04/02/22  0516   HEMOGLOBIN g/dL 10.7*   HEMATOCRIT % 33.0*     COVID19   Date Value Ref Range Status   03/29/2022 Not Detected Not Detected - Ref. Range Final     Lab Results   Component Value Date    HGBA1C 6.3 (H) 03/12/2022       RD to follow up per protocol.    Electronically signed by:  Flores Au RD  04/02/22 10:22 EDT
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
## 2022-04-03 NOTE — PROGRESS NOTES
[facility] Medicine Services Daily Progress Note    Patient Name: Benson Mclean  : 1950  MRN: [id number]  Primary Care Physician:  Rudolph Rooney MD  Date of admission: 3/29/2022      Subjective      Chief Complaint: Shortness of breath    Patient seen and examined this morning.  Doing well, breathing continues to improve. No other complaints.    Pertinent positives as noted in HPI/subjective.  All other systems were reviewed and are negative.      Objective      Vitals:   Temp:  [98.1 °F (36.7 °C)-99.1 °F (37.3 °C)] 98.1 °F (36.7 °C)  Heart Rate:  [] 108  Resp:  [12-20] 16  BP: ()/(48-55) 96/50  Flow (L/min):  [2-3] 2    Physical Exam:    General: Awake and alert, lying in bed, chronically ill-appearing, NAD  Eyes: PERRL, EOMI, conjunctive are clear  Cardiovascular: Regular rate and rhythm, no murmurs  Respiratory: Decreased breath sounds bilaterally, improving, no wheezing, unlabored breathing  Abdomen: Soft, obese, nontender, positive bowel sounds, no guarding  Neurologic: A&O, CN grossly intact, moves all extremities spontaneously  Musculoskeletal: Generalized weakness with right greater than left noted, no gross deformities  Skin: Warm, dry, intact         Result Review    Result Review:  I have personally reviewed the results from the time of this admission to 4/3/2022 10:15 EDT and agree with these findings:  [x]  Laboratory  [x]  Microbiology  [x]  Radiology  [x]  EKG/Telemetry   []  Cardiology/Vascular   []  Pathology  []  Old records  []  Other:    Wounds (last 24 hours)     LDA Wound     Row Name               Wound 22 0340 perineum Pressure Injury    Wound - Properties Group Placement Date: 22  -AS Placement Time: 340  -AS Present on Hospital Admission: Y  -AS Location: perineum  -AS Primary Wound Type: Pressure inj  -AS      Retired Wound - Properties Group Placement Date: 22  -AS Placement Time: 340  -AS Present on Hospital Admission:  Y  -AS Location: perineum  -AS Primary Wound Type: Pressure inj  -AS      Retired Wound - Properties Group Date first assessed: 03/30/22  -AS Time first assessed: 0340  -AS Present on Hospital Admission: Y  -AS Location: perineum  -AS Primary Wound Type: Pressure inj  -AS              Wound 03/30/22 0341 Left posterior thigh    Wound - Properties Group Placement Date: 03/30/22  -AS Placement Time: 0341  -AS Present on Hospital Admission: Y  -AS Side: Left  -AS Orientation: posterior  -AS Location: thigh  -AS      Retired Wound - Properties Group Placement Date: 03/30/22  -AS Placement Time: 0341  -AS Present on Hospital Admission: Y  -AS Side: Left  -AS Orientation: posterior  -AS Location: thigh  -AS      Retired Wound - Properties Group Date first assessed: 03/30/22  -AS Time first assessed: 0341  -AS Present on Hospital Admission: Y  -AS Side: Left  -AS Location: thigh  -AS              Wound 03/30/22 0342 Right popliteal    Wound - Properties Group Placement Date: 03/30/22  -AS Placement Time: 0342  -AS Present on Hospital Admission: Y  -AS Side: Right  -AS Location: popliteal  -AS      Retired Wound - Properties Group Placement Date: 03/30/22  -AS Placement Time: 0342  -AS Present on Hospital Admission: Y  -AS Side: Right  -AS Location: popliteal  -AS      Retired Wound - Properties Group Date first assessed: 03/30/22  -AS Time first assessed: 0342  -AS Present on Hospital Admission: Y  -AS Side: Right  -AS Location: popliteal  -AS              Wound 03/30/22 0343 Left heel    Wound - Properties Group Placement Date: 03/30/22  -AS Placement Time: 0343  -AS Present on Hospital Admission: Y  -AS Side: Left  -AS Location: heel  -AS      Retired Wound - Properties Group Placement Date: 03/30/22  -AS Placement Time: 0343  -AS Present on Hospital Admission: Y  -AS Side: Left  -AS Location: heel  -AS      Retired Wound - Properties Group Date first assessed: 03/30/22  -AS Time first assessed: 0343  -AS Present on  Hospital Admission: Y  -AS Side: Left  -AS Location: heel  -AS              Wound 03/30/22 0348 Right anterior ankle    Wound - Properties Group Placement Date: 03/30/22  -AS Placement Time: 0348  -AS Present on Hospital Admission: Y  -AS Side: Right  -AS Orientation: anterior  -AS Location: ankle  -AS      Retired Wound - Properties Group Placement Date: 03/30/22  -AS Placement Time: 0348  -AS Present on Hospital Admission: Y  -AS Side: Right  -AS Orientation: anterior  -AS Location: ankle  -AS      Retired Wound - Properties Group Date first assessed: 03/30/22  -AS Time first assessed: 0348  -AS Present on Hospital Admission: Y  -AS Side: Right  -AS Location: ankle  -AS              Wound 03/30/22 0349 Left antecubital    Wound - Properties Group Placement Date: 03/30/22  -AS Placement Time: 0349  -AS Present on Hospital Admission: Y  -AS Side: Left  -AS Location: antecubital  -AS      Retired Wound - Properties Group Placement Date: 03/30/22  -AS Placement Time: 0349  -AS Present on Hospital Admission: Y  -AS Side: Left  -AS Location: antecubital  -AS      Retired Wound - Properties Group Date first assessed: 03/30/22  -AS Time first assessed: 0349  -AS Present on Hospital Admission: Y  -AS Side: Left  -AS Location: antecubital  -AS            User Key  (r) = Recorded By, (t) = Taken By, (c) = Cosigned By    Initials Name Provider Type    AS Maricruz Mi RN Registered Nurse                  Assessment/Plan      Brief Patient Summary:  Benson Mclean is a 71 y.o. male with multiple medical issues including ESRD on HD [facility], chronic systolic congestive heart failure, CAD s/p CABG, cardiac stent, chronic atrial fibrillation, CVA with residual right-sided weakness, COPD on chronic oxygen, ANNETTE on CPap, previous PE,  HTN, HLD, type 2 diabetes mellitus complicated by neuropathy, anemia of renal disease, vitamin deficiencies, and dementia who presented to [facility] on 3/29/2022  complaining of shortness of breath.    Patient is poor historian but states he has been feeling shortness of breath for past few days.  Unknown of last dialysis.  Was recently here for CHF exacerbation and was diagnosed with ischemic CVA at the time with mild hemorrhagic conversion.  Was discharged to rehab and plan for restarting Eliquis if repeat CT head is stable per neurology recommendations.  CT of the head on admission is stable.  On admission patient severely hypoxic and placed on high flow oxygen.  Imaging report noted to have large right-sided pleural effusion, pulmonology and nephrology consulted.  Chest tube placed by pulmonology on 3/30.  Transudate fluid with Cultures negative so far.      apixaban, 5 mg, Oral, Q12H  atorvastatin, 40 mg, Oral, Nightly  Barium Sulfate, 1 teaspoon(s), Oral, Once in imaging  budesonide, 0.5 mg, Nebulization, BID  docusate sodium, 100 mg, Oral, Daily  donepezil, 10 mg, Oral, Nightly  guaiFENesin, 600 mg, Oral, BID  insulin lispro, 0-14 Units, Subcutaneous, 4x Daily With Meals & Nightly  ipratropium-albuterol, 3 mL, Nebulization, TID - RT  levothyroxine, 50 mcg, Oral, Q AM  metoprolol tartrate, 37.5 mg, Oral, BID  midodrine, 10 mg, Oral, TID AC  pantoprazole, 40 mg, Oral, QAM AC  primidone, 25 mg, Oral, Daily  sertraline, 50 mg, Oral, Daily             Active Hospital Problems:  Active Hospital Problems    Diagnosis    • **Acute on chronic systolic congestive heart failure (HCC)    • Dyspnea, unspecified type    • Vitamin B12 deficiency    • Hypothyroidism (acquired)    • Acute CVA (cerebrovascular accident) (HCC)      Right occipital with residual vision loss     • Obesity (BMI 30-39.9)    • Congestive heart failure, unspecified HF chronicity, unspecified heart failure type (HCC)    • Dependence on intermittent renal dialysis (HCC)    • End stage renal disease (HCC)    • Anemia of renal disease    • Type 2 diabetes mellitus with hyperglycemia (HCC)    • Mixed hyperlipidemia     • Obesity    • History of cerebrovascular accident    • Major depressive disorder, recurrent, mild (HCC)    • Flaccid hemiplegia of right dominant side as late effect of cerebral infarction (HCC)    • Longstanding persistent atrial fibrillation (HCC)    • Unspecified dementia with behavioral disturbance (HCC)    • Essential hypertension    • S/P CABG (coronary artery bypass graft)    • Chronic venous hypertension (idiopathic) with ulcer and inflammation of bilateral lower extremity (HCC)    • Coronary artery disease    • Vitamin D deficiency    • History of amputation of lesser toe (HCC)    • ANNETTE treated with BiPAP    • Diabetic neuropathy (HCC)    • Status post coronary artery stent placement      Plan:     Acute on chronic hypoxic and hypercapnic respiratory failure, improving  Large right-sided pleural effusion  Acute on chronic HFrEF  -Multifactorial with underlying ANNETTE, and CHF with morbid obesity  -Imaging reports noted  -s/p right chest tube placement by pulmonology on 3/30, transudate fluid noted  -ultrafiltration per nephrology  -Patient does not make urine, Lasix discontinued  -Continue other home meds  -Weaned down to nasal cannula  -Pulmonology following, palliative care following, remains full code     ESRD on HD  -Ultrafiltration as tolerated  -Midodrine for blood pressure support  -Continue dialysis support per nephrology    Recent CVA  -Diagnosed during last admission, previous imaging reports and discharge summary noted  -Has some residual right-sided deficits  -Repeat CT head this admission is stable, no hemorrhage noted  -Per previous notes, plan was to start Eliquis for his A. fib if repeat CT is stable per neurology recommendations  -Started on Eliquis this admission    PAF  -Rate controlled currently, continue home meds  -Eliquis started  -Monitor on telemetry     DM2  Dysphagia  -Patient much more awake and alert, s/p VFSS, on diet per ST  -Basal insulin stopped due to hypoglycemia, resume  when able  -Continue sliding scale, monitor blood glucose  -Adjust as needed    Hypothyroidism  -Continue home meds     Major depressive disorder, recurrent, mild   -Continue home meds, monitor     Obesity (BMI 30-39.9)  BMI 36.92  -Lifestyle modifications to reduce cardiovascular risk factors    DVT ppx  -Eliquis       CODE STATUS:    Code Status (Patient has no pulse and is not breathing): CPR (Attempt to Resuscitate)  Medical Interventions (Patient has pulse or is breathing): Full Support      Disposition: Rehab placement, needs pre-CERT    Electronically signed by Ivan Tapia DO, 04/03/22, 10:15 EDT.  [facility] Hospitalist Team

## 2022-04-03 NOTE — PLAN OF CARE
Problem: Adult Inpatient Plan of Care  Goal: Plan of Care Review  Outcome: Ongoing, Progressing  Flowsheets (Taken 4/3/2022 1506)  Progress: no change  Plan of Care Reviewed With: patient  Goal: Patient-Specific Goal (Individualized)  Outcome: Ongoing, Progressing  Goal: Absence of Hospital-Acquired Illness or Injury  Outcome: Ongoing, Progressing  Intervention: Identify and Manage Fall Risk  Recent Flowsheet Documentation  Taken 4/3/2022 1400 by Christine Masters RN  Safety Promotion/Fall Prevention:   safety round/check completed   nonskid shoes/slippers when out of bed  Taken 4/3/2022 1208 by Christine Masters RN  Safety Promotion/Fall Prevention:   safety round/check completed   nonskid shoes/slippers when out of bed  Taken 4/3/2022 1000 by Christine Masters RN  Safety Promotion/Fall Prevention:   safety round/check completed   nonskid shoes/slippers when out of bed  Taken 4/3/2022 0800 by Christine Masters RN  Safety Promotion/Fall Prevention:   safety round/check completed   nonskid shoes/slippers when out of bed  Intervention: Prevent Skin Injury  Recent Flowsheet Documentation  Taken 4/3/2022 1301 by Christine Masters RN  Body Position:   turned   left  Taken 4/3/2022 0800 by Christine Masters RN  Body Position:   turned   right  Goal: Optimal Comfort and Wellbeing  Outcome: Ongoing, Progressing  Goal: Readiness for Transition of Care  Outcome: Ongoing, Progressing     Problem: Fall Injury Risk  Goal: Absence of Fall and Fall-Related Injury  Outcome: Ongoing, Progressing  Intervention: Promote Injury-Free Environment  Recent Flowsheet Documentation  Taken 4/3/2022 1400 by Christine Masters RN  Safety Promotion/Fall Prevention:   safety round/check completed   nonskid shoes/slippers when out of bed  Taken 4/3/2022 1208 by Christine Masters RN  Safety Promotion/Fall Prevention:   safety round/check completed   nonskid shoes/slippers when out of bed  Taken 4/3/2022 1000 by Christine Masters RN  Safety Promotion/Fall Prevention:   safety  round/check completed   nonskid shoes/slippers when out of bed  Taken 4/3/2022 0800 by Christine Masters RN  Safety Promotion/Fall Prevention:   safety round/check completed   nonskid shoes/slippers when out of bed     Problem: COPD (Chronic Obstructive Pulmonary Disease) Comorbidity  Goal: Maintenance of COPD Symptom Control  Outcome: Ongoing, Progressing     Problem: Diabetes Comorbidity  Goal: Blood Glucose Level Within Targeted Range  Outcome: Ongoing, Progressing     Problem: Heart Failure Comorbidity  Goal: Maintenance of Heart Failure Symptom Control  Outcome: Ongoing, Progressing     Problem: Hypertension Comorbidity  Goal: Blood Pressure in Desired Range  Outcome: Ongoing, Progressing     Problem: Pain Chronic (Persistent) (Comorbidity Management)  Goal: Acceptable Pain Control and Functional Ability  Outcome: Ongoing, Progressing     Problem: Skin Injury Risk Increased  Goal: Skin Health and Integrity  Outcome: Ongoing, Progressing  Intervention: Optimize Skin Protection  Recent Flowsheet Documentation  Taken 4/3/2022 1301 by Christine Masters, RN  Head of Bed (HOB) Positioning: HOB at 30 degrees  Taken 4/3/2022 0800 by Christine Masters RN  Head of Bed (HOB) Positioning: HOB at 30 degrees   Goal Outcome Evaluation:  Plan of Care Reviewed With: patient   Pt down to 2L NC. Chest tube still intact with little drainage this shift.      Progress: no change

## 2022-04-03 NOTE — PROGRESS NOTES
Daily Progress Note        Acute on chronic systolic congestive heart failure (HCC)    S/P CABG (coronary artery bypass graft)    Essential hypertension    Status post coronary artery stent placement    ANNETTE treated with BiPAP    Major depressive disorder, recurrent, mild (HCC)    Mixed hyperlipidemia    History of cerebrovascular accident    History of amputation of lesser toe (HCC)    Flaccid hemiplegia of right dominant side as late effect of cerebral infarction (HCC)    Diabetic neuropathy (HCC)    Unspecified dementia with behavioral disturbance (HCC)    Coronary artery disease    Obesity    Vitamin D deficiency    Chronic venous hypertension (idiopathic) with ulcer and inflammation of bilateral lower extremity (HCC)    Longstanding persistent atrial fibrillation (HCC)    Type 2 diabetes mellitus with hyperglycemia (HCC)    Anemia of renal disease    End stage renal disease (HCC)    Dependence on intermittent renal dialysis (HCC)    Congestive heart failure, unspecified HF chronicity, unspecified heart failure type (HCC)    Obesity (BMI 30-39.9)    Acute CVA (cerebrovascular accident) (HCC)    Hypothyroidism (acquired)    Vitamin B12 deficiency    Dyspnea, unspecified type      Assessment  Chronic large right pleural effusion  Acute on chronic systolic CHF- Elevated proBNP> 70,000.0     COPD  ANNETTE-noncompliant with CPAP  Cardiomyopathy  Pulmonary hypertension  End-stage renal disease-HD M/W/F  HTN  Anemia due to chronic kidney disease  Type 2 diabetes  CAD-S/P CABG  HLD  Chronic A. fib  Hypothyroidism  Tremors  Depression  Dementia  Vitamin B12 and D deficiencies  Obesity  History of cerebral hemorrhage-residual flaccid hemiplegia right side with vision loss  Cytokine release syndrome, grade 1     Last echo 3/13/2022  -EF 26-30% with severely decreased LV systolic function  -RVSP 50  -Right ventricular cavity is mild to moderately dilated  -Moderate left pleural effusion noted     3/29/2022 respiratory panel was  negative     3/30/2022 ABG at 1:30 AM on HF NC-pH 7.23, CO2 58, PO2 77, bicarb 24    Plan    Chest tube management  -CXR daily  -waterseal  - >200 mL out since yesterday     Continue to titrate oxygen- Currently on  3L NC and BiPAP as needed  Lasix 40 mg IV every 8 hours  Mucinex  Midodrine 10 mg 3 times a day   Pulmicort nebulizer 2 times a day  DuoNebs 3 times a day  Synthroid 50 mcg  Electrolyte/Glycemic control  Speech therapy following  Anticoagulation and GI prophylaxis-Eliquis and Protonix     4/3/2022                                                                  4/2/22       LOS: 5 days     Subjective   Patient reports breathing better today    Objective     Vital signs for last 24 hours:  Vitals:    04/02/22 2054 04/02/22 2110 04/02/22 2300 04/03/22 0454   BP: 113/55 115/55 101/48 96/50   BP Location: Right arm  Right arm Right arm   Patient Position: Lying  Lying Lying   Pulse: 98 101 93 90   Resp: 15  16 14   Temp:   98.7 °F (37.1 °C) 98.1 °F (36.7 °C)   TempSrc:   Oral Oral   SpO2: 95%  100% 99%   Weight:    108 kg (238 lb 8.6 oz)   Height:           Intake/Output last 3 shifts:  I/O last 3 completed shifts:  In: 240 [P.O.:240]  Out: 75 [Chest Tube:75]  Intake/Output this shift:  No intake/output data recorded.      Radiology  Imaging Results (Last 24 Hours)     Procedure Component Value Units Date/Time    XR Chest 1 View [[id number]] Resulted: 04/03/22 0534     Updated: 04/03/22 0535    XR Chest 1 View [[id number]] Collected: 04/02/22 0943     Updated: 04/02/22 0946    Narrative:      EXAM: XR CHEST 1 VW-     DATE OF EXAM: 4/2/2022 6:31 AM     INDICATION: Chest tube; R06.00-Dyspnea, unspecified; I50.9-Heart  failure, unspecified; J96.01-Acute respiratory failure with hypoxia;  J96.02-Acute respiratory failure with hypercapnia.       COMPARISONS: 4/1/2022      FINDINGS:     Unchanged left pleural effusion with adjacent atelectasis. No  pneumothorax either side. Right-sided chest tube is  unchanged.  Mediastinal shadows are unchanged..       Impression:         Stable chest over the past 24 hours. Unchanged left pleural effusion  with adjacent atelectasis.     Right-sided chest tube is unchanged with pigtail in the mid right  thorax. No pneumothorax.     Electronically Signed By-Zaire Irving On:4/2/2022 9:44 AM  This report was finalized on [id number] by  Zaire Irving, .          Labs:  Results from last 7 days   Lab Units 04/03/22  0511   WBC 10*3/mm3 5.10   HEMOGLOBIN g/dL 10.4*   HEMATOCRIT % 31.6*   PLATELETS 10*3/mm3 158     Results from last 7 days   Lab Units 04/03/22  0511   SODIUM mmol/L 135*   POTASSIUM mmol/L 4.0   CHLORIDE mmol/L 101   CO2 mmol/L 20.0*   BUN mg/dL 27*   CREATININE mg/dL 3.56*   CALCIUM mg/dL 8.2*   GLUCOSE mg/dL 142*     Results from last 7 days   Lab Units 03/30/22  0123   PH, ARTERIAL pH units 7.232*   PO2 ART mm Hg 77.7*   PCO2, ARTERIAL mm Hg 58.2*   HCO3 ART mmol/L 24.5                                   Meds:   SCHEDULE  apixaban, 5 mg, Oral, Q12H  atorvastatin, 40 mg, Oral, Nightly  Barium Sulfate, 1 teaspoon(s), Oral, Once in imaging  budesonide, 0.5 mg, Nebulization, BID  docusate sodium, 100 mg, Oral, Daily  donepezil, 10 mg, Oral, Nightly  guaiFENesin, 600 mg, Oral, BID  insulin lispro, 0-14 Units, Subcutaneous, 4x Daily With Meals & Nightly  ipratropium-albuterol, 3 mL, Nebulization, TID - RT  levothyroxine, 50 mcg, Oral, Q AM  metoprolol tartrate, 37.5 mg, Oral, BID  midodrine, 10 mg, Oral, TID AC  pantoprazole, 40 mg, Oral, QAM AC  primidone, 25 mg, Oral, Daily  sertraline, 50 mg, Oral, Daily      Infusions     PRNs  dextrose  •  dextrose  •  glucagon (human recombinant)  •  heparin (porcine)  •  insulin lispro **AND** insulin lispro  •  [COMPLETED] Insert peripheral IV **AND** sodium chloride    Physical Exam:  Physical Exam  General Appearance:  Alert.  No distress noted.  HEENT:  Normocephalic, without obvious abnormality, Conjunctiva/corneas clear,.   Normal external ear canals, Nares normal, no drainage     Neck:  Supple, symmetrical, trachea midline. No JVD.  Lungs /Chest wall:   Minimal basal Rales, diminished right base, respirations unlabored symmetrical wall movement.     Right chest tube in place  Heart:  Regular rate and rhythm, systolic murmur PMI left sternal border  Abdomen: Soft, non-tender, no masses, no organomegaly.    Extremities: No edema, no clubbing or cyanosis. right-sided weakness.      ROS  Review of Systems  Constitutional: Negative for chills, fever and malaise. positive for fatigue.   HENT: Negative.    Eyes: Negative.    Cardiovascular: Negative.    Respiratory: Positive for cough and shortness of breath, improving  Skin: Negative.    Musculoskeletal: Hemiplegia on right side  Gastrointestinal: Negative.    Genitourinary: Negative.    Neurological: Negative.    Psychiatric/Behavioral: Negative.          I have reviewed current clinicals.     Electronically signed by FABIOLA Brady, 04/03/22, 8:07 AM EDT.     The NP scribed the note for me, I have examined the patient and reviewed and verified the above findings and and I formulated the assessment and plan as documented

## 2022-04-04 NOTE — SIGNIFICANT NOTE
04/04/22 1329   OTHER   Discipline physical therapist   Rehab Time/Intention   Session Not Performed patient unavailable for treatment  (checked on pt at 11:02, and he was gone for HD)   Recommendation   PT - Next Appointment 04/05/22

## 2022-04-04 NOTE — SIGNIFICANT NOTE
04/04/22 1301   OTHER   Discipline occupational therapist   Rehab Time/Intention   Session Not Performed patient unavailable for treatment  (Patient going to dialysis @ 11:00)   Recommendation   OT - Next Appointment 04/05/22

## 2022-04-04 NOTE — PROGRESS NOTES
[facility] Medicine Services Daily Progress Note    Patient Name: Benson Mclean  : 1950  MRN: [id number]  Primary Care Physician:  Rudolph Rooney MD  Date of admission: 3/29/2022      Subjective      Chief Complaint: Shortness of breath    2022  Patient seen and examined.    Pertinent positives as noted in HPI/subjective.  All other systems were reviewed and are negative.      Objective      Vitals:   Temp:  [97.5 °F (36.4 °C)-99.3 °F (37.4 °C)] 98.5 °F (36.9 °C)  Heart Rate:  [] 104  Resp:  [16-18] 16  BP: ()/(38-61) 107/57  Flow (L/min):  [2] 2    Physical Exam:    General: Awake and alert, lying in bed, chronically ill-appearing, NAD  Eyes: PERRL, EOMI, conjunctive are clear  Cardiovascular: Regular rate and rhythm, no murmurs  Respiratory: Decreased breath sounds bilaterally, improving, no wheezing, unlabored breathing  Abdomen: Soft, obese, nontender, positive bowel sounds, no guarding  Neurologic: A&O, CN grossly intact, moves all extremities spontaneously  Musculoskeletal: Generalized weakness with right greater than left noted, no gross deformities  Skin: Warm, dry, intact         Result Review    Result Review:  I have personally reviewed the results from the time of this admission to 2022 14:50 EDT and agree with these findings:  [x]  Laboratory  [x]  Microbiology  [x]  Radiology  [x]  EKG/Telemetry   []  Cardiology/Vascular   []  Pathology  []  Old records  []  Other:    Wounds (last 24 hours)     LDA Wound     Row Name 22 1200 22 0800 22 1642       Wound 22 0340 perineum Pressure Injury    Wound - Properties Group Placement Date: 22  -AS Placement Time: 340  -AS Present on Hospital Admission: Y  -AS Location: perineum  -AS Primary Wound Type: Pressure inj  -AS    Pressure Injury Stage -- 1  -BC --    Dressing Appearance -- open to air  -BC --    Base red  -BC red  -BC red  -AD    Drainage Amount none  -BC none  -BC --     Care, Wound -- cleansed with;soap and water;barrier applied  -BC --    Retired Wound - Properties Group Placement Date: 03/30/22  -AS Placement Time: 0340  -AS Present on Hospital Admission: Y  -AS Location: perineum  -AS Primary Wound Type: Pressure inj  -AS    Retired Wound - Properties Group Date first assessed: 03/30/22  -AS Time first assessed: 0340  -AS Present on Hospital Admission: Y  -AS Location: perineum  -AS Primary Wound Type: Pressure inj  -AS       Wound 03/30/22 0341 Left posterior thigh    Wound - Properties Group Placement Date: 03/30/22  -AS Placement Time: 0341  -AS Present on Hospital Admission: Y  -AS Side: Left  -AS Orientation: posterior  -AS Location: thigh  -AS    Base scab  -BC scab  -BC scab  -AD    Retired Wound - Properties Group Placement Date: 03/30/22  -AS Placement Time: 0341  -AS Present on Hospital Admission: Y  -AS Side: Left  -AS Orientation: posterior  -AS Location: thigh  -AS    Retired Wound - Properties Group Date first assessed: 03/30/22  -AS Time first assessed: 0341  -AS Present on Hospital Admission: Y  -AS Side: Left  -AS Location: thigh  -AS       Wound 03/30/22 0342 Right popliteal    Wound - Properties Group Placement Date: 03/30/22  -AS Placement Time: 0342  -AS Present on Hospital Admission: Y  -AS Side: Right  -AS Location: popliteal  -AS    Closure None  -BC None  -BC None  -AD    Retired Wound - Properties Group Placement Date: 03/30/22  -AS Placement Time: 0342  -AS Present on Hospital Admission: Y  -AS Side: Right  -AS Location: popliteal  -AS    Retired Wound - Properties Group Date first assessed: 03/30/22  -AS Time first assessed: 0342  -AS Present on Hospital Admission: Y  -AS Side: Right  -AS Location: popliteal  -AS       Wound 03/30/22 0343 Left heel    Wound - Properties Group Placement Date: 03/30/22  -AS Placement Time: 0343  -AS Present on Hospital Admission: Y  -AS Side: Left  -AS Location: heel  -AS    Pressure Injury Stage -- 1  -BC --     Dressing Appearance -- open to air  -BC --    Base blanchable;red  -BC blanchable;red  -BC blanchable;red  -AD    Retired Wound - Properties Group Placement Date: 03/30/22  -AS Placement Time: 0343  -AS Present on Hospital Admission: Y  -AS Side: Left  -AS Location: heel  -AS    Retired Wound - Properties Group Date first assessed: 03/30/22  -AS Time first assessed: 0343  -AS Present on Hospital Admission: Y  -AS Side: Left  -AS Location: heel  -AS       Wound 03/30/22 0348 Right anterior ankle    Wound - Properties Group Placement Date: 03/30/22  -AS Placement Time: 0348  -AS Present on Hospital Admission: Y  -AS Side: Right  -AS Orientation: anterior  -AS Location: ankle  -AS    Dressing Appearance -- open to air  -BC --    Closure None  -BC None  -BC --    Base scab  -BC scab  -BC scab  -AD    Retired Wound - Properties Group Placement Date: 03/30/22  -AS Placement Time: 0348  -AS Present on Hospital Admission: Y  -AS Side: Right  -AS Orientation: anterior  -AS Location: ankle  -AS    Retired Wound - Properties Group Date first assessed: 03/30/22  -AS Time first assessed: 0348  -AS Present on Hospital Admission: Y  -AS Side: Right  -AS Location: ankle  -AS       Wound 03/30/22 0349 Left antecubital    Wound - Properties Group Placement Date: 03/30/22  -AS Placement Time: 0349  -AS Present on Hospital Admission: Y  -AS Side: Left  -AS Location: antecubital  -AS    Retired Wound - Properties Group Placement Date: 03/30/22  -AS Placement Time: 0349  -AS Present on Hospital Admission: Y  -AS Side: Left  -AS Location: antecubital  -AS    Retired Wound - Properties Group Date first assessed: 03/30/22  -AS Time first assessed: 0349  -AS Present on Hospital Admission: Y  -AS Side: Left  -AS Location: antecubital  -AS          User Key  (r) = Recorded By, (t) = Taken By, (c) = Cosigned By    Initials Name Provider Type    Ana Hernandez RN Registered Nurse    Christine Lopez RN Registered Nurse    AS Strange,  Maricruz RN Registered Nurse                  Assessment/Plan      Brief Patient Summary:  Benson Mclean is a 71 y.o. male with multiple medical issues including ESRD on HD Urwlvy-Bhpnvigbm-Pvzmdt, chronic systolic congestive heart failure, CAD s/p CABG, cardiac stent, chronic atrial fibrillation, CVA with residual right-sided weakness, COPD on chronic oxygen, ANNETTE on CPap, previous PE,  HTN, HLD, type 2 diabetes mellitus complicated by neuropathy, anemia of renal disease, vitamin deficiencies, and dementia who presented to [facility] on 3/29/2022 complaining of shortness of breath.    Patient is poor historian but states he has been feeling shortness of breath for past few days.  Unknown of last dialysis.  Was recently here for CHF exacerbation and was diagnosed with ischemic CVA at the time with mild hemorrhagic conversion.  Was discharged to rehab and plan for restarting Eliquis if repeat CT head is stable per neurology recommendations.  CT of the head on admission is stable.  On admission patient severely hypoxic and placed on high flow oxygen.  Imaging report noted to have large right-sided pleural effusion, pulmonology and nephrology consulted.  Chest tube placed by pulmonology on 3/30.  Transudate fluid with Cultures negative so far.      apixaban, 5 mg, Oral, Q12H  atorvastatin, 40 mg, Oral, Nightly  Barium Sulfate, 1 teaspoon(s), Oral, Once in imaging  budesonide, 0.5 mg, Nebulization, BID  docusate sodium, 100 mg, Oral, Daily  donepezil, 10 mg, Oral, Nightly  guaiFENesin, 600 mg, Oral, BID  insulin lispro, 0-14 Units, Subcutaneous, 4x Daily With Meals & Nightly  ipratropium-albuterol, 3 mL, Nebulization, TID - RT  levothyroxine, 50 mcg, Oral, Q AM  metoprolol tartrate, 37.5 mg, Oral, BID  midodrine, 10 mg, Oral, TID AC  pantoprazole, 40 mg, Oral, QAM AC  primidone, 25 mg, Oral, Daily  sertraline, 50 mg, Oral, Daily             Active Hospital Problems:  Active Hospital Problems    Diagnosis     • **Acute on chronic systolic congestive heart failure (HCC)    • Dyspnea, unspecified type    • Vitamin B12 deficiency    • Hypothyroidism (acquired)    • Acute CVA (cerebrovascular accident) ([facility])      Right occipital with residual vision loss     • Obesity (BMI 30-39.9)    • Congestive heart failure, unspecified HF chronicity, unspecified heart failure type ([facility])    • Dependence on intermittent renal dialysis ([facility])    • End stage renal disease ([facility])    • Anemia of renal disease    • Type 2 diabetes mellitus with hyperglycemia ([facility])    • Mixed hyperlipidemia    • Obesity    • History of cerebrovascular accident    • Major depressive disorder, recurrent, mild ([facility])    • Flaccid hemiplegia of right dominant side as late effect of cerebral infarction ([facility])    • Longstanding persistent atrial fibrillation ([facility])    • Unspecified dementia with behavioral disturbance ([facility])    • Essential hypertension    • S/P CABG (coronary artery bypass graft)    • Chronic venous hypertension (idiopathic) with ulcer and inflammation of bilateral lower extremity ([facility])    • Coronary artery disease    • Vitamin D deficiency    • History of amputation of lesser toe ([facility])    • ANNETTE treated with BiPAP    • Diabetic neuropathy ([facility])    • Status post coronary artery stent placement      Plan:     Acute on chronic hypoxic and hypercapnic respiratory failure, improving  Large right-sided pleural effusion  Acute on chronic HFrEF  -Multifactorial with underlying ANNETTE, and CHF with morbid obesity  -Imaging reports noted  -s/p right chest tube placement by pulmonology on 3/30, transudate fluid noted  -ultrafiltration per nephrology  -Patient does not make urine, Lasix discontinued  -Continue other home meds  -Weaned down to nasal cannula  -Pulmonology following, palliative care following, remains full code     ESRD on HD  -Ultrafiltration as tolerated  -Midodrine for blood pressure support  -Continue dialysis support per nephrology    Recent  CVA  -Diagnosed during last admission, previous imaging reports and discharge summary noted  -Has some residual right-sided deficits  -Repeat CT head this admission is stable, no hemorrhage noted  -Per previous notes, plan was to start Eliquis for his A. fib if repeat CT is stable per neurology recommendations  -Started on Eliquis this admission    PAF  -Rate controlled currently, continue home meds  -Eliquis started  -Monitor on telemetry     DM2  Dysphagia  -Patient much more awake and alert, s/p VFSS, on diet per ST  -Basal insulin stopped due to hypoglycemia, resume when able  -Continue sliding scale, monitor blood glucose  -Adjust as needed    Hypothyroidism  -Continue home meds     Major depressive disorder, recurrent, mild   -Continue home meds, monitor     Obesity (BMI 30-39.9)  BMI 36.92  -Lifestyle modifications to reduce cardiovascular risk factors    DVT ppx  -Eliquis       CODE STATUS:    Code Status (Patient has no pulse and is not breathing): CPR (Attempt to Resuscitate)  Medical Interventions (Patient has pulse or is breathing): Full Support      Disposition: Rehab placement, needs pre-CERT    Electronically signed by Mari Bernal MD, 04/04/22, 14:50 EDT.  [facility] Hospitalist Team

## 2022-04-04 NOTE — PROGRESS NOTES
Nutrition Services    Patient Name: Benson Mclean  YOB: 1950  MRN: [id number]  Admission date: 3/29/2022    PPE Documentation        PPE Worn By Provider Did not enter room for this encounter   PPE Worn By Patient  N/A     PROGRESS NOTE      Encounter Information: Progress note to monitor for nutrition plan of care. PO diet advanced to [facility] soft with NTL.        PO Diet: Diet Texture; Mechanical Ground; Thickened Liquids (Nectar)   PO Supplements: None   PO Intake:  83% average po intakes since last RD review        Nutrition support orders: -   Nutrition support review: -       Labs (reviewed below): -hyponatremia  -elevated BUN/Cr  -hypocalcemia  -Hyperglycemia       GI Function:  Last BM 4/4 (confirmed this AM per RN via Secure Chat)       Nutrition Intervention: Will monitor PO intakes and need to start oral nutritional supplements.        Results from last 7 days   Lab Units 04/04/22  0449 04/03/22  0511 04/02/22  0652   SODIUM mmol/L 133* 135* 135*   POTASSIUM mmol/L 4.3 4.0 4.1   CHLORIDE mmol/L 100 101 101   CO2 mmol/L 20.0* 20.0* 22.0   BUN mg/dL 34* 27* 22   CREATININE mg/dL 3.96* 3.56* 2.77*   CALCIUM mg/dL 8.1* 8.2* 8.4*   GLUCOSE mg/dL 141* 142* 148*     Results from last 7 days   Lab Units 04/04/22  0449   HEMOGLOBIN g/dL 10.7*   HEMATOCRIT % 32.7*     COVID19   Date Value Ref Range Status   03/29/2022 Not Detected Not Detected - Ref. Range Final     Lab Results   Component Value Date    HGBA1C 6.3 (H) 03/12/2022       RD to follow up per protocol.    Electronically signed by:  Promise Ramirez RD  04/04/22 09:53 EDT

## 2022-04-04 NOTE — CASE MANAGEMENT/SOCIAL WORK
Continued Stay Note   Brennan     Patient Name: Benson Mclean  MRN: [id number]  Today's Date: 4/4/2022    Admit Date: 3/29/2022     Discharge Plan     Row Name 04/04/22 1616       Plan    Plan DC Plan: [facility] following pending precert. Peer to peer requested to be completed by 4/5 at 1030. PASRR per facility. Current OP HD [facility] [address] on MWF.    Plan Comments CM contacted [facility] liaMaricruz welch to inquire status of pending referral. She inquired about chest tube. CT remains in place at this time, pulmonology following. Liaison reported that precert could be started. CM later received call from [place] with [email or web address] partnered with [employer] that the Medical Director has requested a peer to peer to be completed by deadline on tomorrow, 4/5 at 1030 by calling phone number [phone] Option 5. Reported that any questions for assistance could be answered by calling phone number and Option 3. CM sent request to Demetria at [facility] inquiring if Dr. Langford, Physician Advisor would be able to complete. She reported she would send to him. CM also sent request to hospitalist MD updating her on P2P pending completion by Dr. Langford.              Phone communication or documentation only - no physical contact with patient or family.    Kristina Gastelum RN     Office Phone: [phone]  Office Cell: [phone]

## 2022-04-04 NOTE — PROGRESS NOTES
Daily Progress Note        Acute on chronic systolic congestive heart failure (HCC)    S/P CABG (coronary artery bypass graft)    Essential hypertension    Status post coronary artery stent placement    ANNETTE treated with BiPAP    Major depressive disorder, recurrent, mild (HCC)    Mixed hyperlipidemia    History of cerebrovascular accident    History of amputation of lesser toe (HCC)    Flaccid hemiplegia of right dominant side as late effect of cerebral infarction (HCC)    Diabetic neuropathy (HCC)    Unspecified dementia with behavioral disturbance (HCC)    Coronary artery disease    Obesity    Vitamin D deficiency    Chronic venous hypertension (idiopathic) with ulcer and inflammation of bilateral lower extremity (HCC)    Longstanding persistent atrial fibrillation (HCC)    Type 2 diabetes mellitus with hyperglycemia (HCC)    Anemia of renal disease    End stage renal disease (HCC)    Dependence on intermittent renal dialysis (HCC)    Congestive heart failure, unspecified HF chronicity, unspecified heart failure type (HCC)    Obesity (BMI 30-39.9)    Acute CVA (cerebrovascular accident) (HCC)    Hypothyroidism (acquired)    Vitamin B12 deficiency    Dyspnea, unspecified type      Assessment    Chronic large right pleural effusion  Chronic obstructive pulmonary disease  Struct of sleep apnea, noncompliant with CPAP  Pulmonary hypertension    Acute on chronic systolic CHF- Elevated proBNP> 70,000.0  Last echo 3/13/2022  -EF 26-30% with severely decreased LV systolic function  -RVSP 50  -Right ventricular cavity is mild to moderately dilated  -Moderate left pleural effusion noted     End-stage renal disease-HD M/W/F    HTN  Type 2 diabetes  CAD-S/P CABG  Hyperlipidemia  Chronic A. fib  Hypothyroidism  Tremors  Depression  Dementia  Vitamin B12 and D deficiencies  Obesity    History of cerebral hemorrhage-residual flaccid hemiplegia right side with vision loss  Cytokine release syndrome, grade 1     3/29/2022  respiratory panel was negative     Plan     Chest tube management  -CXR daily  -waterseal  - >200 mL out since yesterday     Continue to titrate oxygen- Currently on  3L NC and BiPAP as needed    Hemodialysis, MWF  BP control, midodrine 10 mg 3 times a day     Pulmicort nebulizer 2 times a day  DuoNebs 3 times a day  Mucinex  Synthroid 50 mcg  Electrolyte/Glycemic control  Anticoagulation Eliquis  GI prophylaxis Protonix     4/3/2022                                                                  4/2/22          LOS: 6 days     Subjective         Objective     Vital signs for last 24 hours:  Vitals:    04/04/22 0635 04/04/22 0638 04/04/22 0640 04/04/22 0643   BP:       BP Location:       Patient Position:       Pulse: 107 110 109 111   Resp: 18 18 18 18   Temp:       TempSrc:       SpO2: 98% 99% 99% 99%   Weight:       Height:           Intake/Output last 3 shifts:  I/O last 3 completed shifts:  In: 600 [P.O.:600]  Out: 105 [Chest Tube:105]  Intake/Output this shift:  No intake/output data recorded.      Radiology  Imaging Results (Last 24 Hours)     Procedure Component Value Units Date/Time    XR Chest 1 View [[id number]] Resulted: 04/04/22 0605     Updated: 04/04/22 0605    XR Chest 1 View [[id number]] Collected: 04/03/22 0908     Updated: 04/03/22 0911    Narrative:      EXAM: XR CHEST 1 VW-     DATE OF EXAM: 4/3/2022 4:42 AM     INDICATION: Chest tube; R06.00-Dyspnea, unspecified; I50.9-Heart  failure, unspecified; J96.01-Acute respiratory failure with hypoxia;  J96.02-Acute respiratory failure with hypercapnia.       COMPARISONS: 4/2/2022      FINDINGS:     Right-sided pigtail chest tube unchanged. No pneumothorax.     There is new right lower lobe strandy airspace opacity. Unchanged  left-sided pleural effusion and atelectasis. Mediastinal shadows are  unchanged..       Impression:         New right lower lobe disease. This may be due to atelectasis, pneumonia  or developing edema.     Stable chest elsewhere.  Unchanged left pleural effusion and left basilar  atelectasis.     Right-sided chest tube is unchanged. No pneumothorax.     Electronically Signed By-Zaire Irving On:4/3/2022 9:09 AM  This report was finalized on [id number] by  Zaire Irving, .          Labs:  Results from last 7 days   Lab Units 04/04/22  0449   WBC 10*3/mm3 5.20   HEMOGLOBIN g/dL 10.7*   HEMATOCRIT % 32.7*   PLATELETS 10*3/mm3 171     Results from last 7 days   Lab Units 04/04/22  0449   SODIUM mmol/L 133*   POTASSIUM mmol/L 4.3   CHLORIDE mmol/L 100   CO2 mmol/L 20.0*   BUN mg/dL 34*   CREATININE mg/dL 3.96*   CALCIUM mg/dL 8.1*   GLUCOSE mg/dL 141*     Results from last 7 days   Lab Units 03/30/22  0123   PH, ARTERIAL pH units 7.232*   PO2 ART mm Hg 77.7*   PCO2, ARTERIAL mm Hg 58.2*   HCO3 ART mmol/L 24.5                                   Meds:   SCHEDULE  apixaban, 5 mg, Oral, Q12H  atorvastatin, 40 mg, Oral, Nightly  Barium Sulfate, 1 teaspoon(s), Oral, Once in imaging  budesonide, 0.5 mg, Nebulization, BID  docusate sodium, 100 mg, Oral, Daily  donepezil, 10 mg, Oral, Nightly  guaiFENesin, 600 mg, Oral, BID  insulin lispro, 0-14 Units, Subcutaneous, 4x Daily With Meals & Nightly  ipratropium-albuterol, 3 mL, Nebulization, TID - RT  levothyroxine, 50 mcg, Oral, Q AM  metoprolol tartrate, 37.5 mg, Oral, BID  midodrine, 10 mg, Oral, TID AC  pantoprazole, 40 mg, Oral, QAM AC  primidone, 25 mg, Oral, Daily  sertraline, 50 mg, Oral, Daily      Infusions     PRNs  dextrose  •  dextrose  •  glucagon (human recombinant)  •  heparin (porcine)  •  insulin lispro **AND** insulin lispro  •  [COMPLETED] Insert peripheral IV **AND** sodium chloride    Physical Exam:  Physical Exam  Vitals reviewed.   Pulmonary:      Breath sounds: Decreased breath sounds present.      Comments: Presence of chest tube  Musculoskeletal:         General: Swelling present.      Right lower leg: Edema present.      Left lower leg: Edema present.   Skin:     General: Skin is  warm and dry.   Neurological:      Mental Status: He is alert.         ROS  Review of Systems   Respiratory: Positive for shortness of breath.    Cardiovascular: Positive for leg swelling.     I reviewed the patient's new clinical results    Electronically signed by FABIOLA Bui

## 2022-04-04 NOTE — PLAN OF CARE
Goal Outcome Evaluation:  Plan of Care Reviewed With: patient        Progress: improving  Outcome Evaluation: Patient on 2L oxygen. HD done today. Will continue to monitor.

## 2022-04-04 NOTE — PROGRESS NOTES
[facility]     Progress Note    Patient Name: Benson Mclean  : 1950  MRN: [id number]  Primary Care Physician:  Rudolph Rooney MD  Date of admission: 3/29/2022    Subjective   Subjective     Chief Complaint: ESRD    History of Present Illness  Patient with ESRD on hd //    Review of Systems    Objective   Objective     Vitals:   Temp:  [97.5 °F (36.4 °C)-99.3 °F (37.4 °C)] 99 °F (37.2 °C)  Heart Rate:  [] 111  Resp:  [16-18] 18  BP: ()/(38-60) 109/53  Flow (L/min):  [2] 2    Physical Exam   General Appearance:  In no acute distress.    HEENT: Normal HEENT exam.     Extremities: .  There is no deformity, effusion or dependent edema.    Neurological: Patient is alert     Result Review    Result Review:  I have personally reviewed the results from the time of this admission to 2022 07:49 EDT and agree with these findings:  []  Laboratory  []  Microbiology  []  Radiology  []  EKG/Telemetry   []  Cardiology/Vascular   []  Pathology  []  Old records  []  Other:      Assessment/Plan   Assessment / Plan     Brief Patient Summary:  Benson Mclean is a 71 y.o. male who has ESRD on hd /    Active Hospital Problems:  Active Hospital Problems    Diagnosis    • **Acute on chronic systolic congestive heart failure (HCC)    • Dyspnea, unspecified type    • Vitamin B12 deficiency    • Hypothyroidism (acquired)    • Acute CVA (cerebrovascular accident) (HCC)      Right occipital with residual vision loss     • Obesity (BMI 30-39.9)    • Congestive heart failure, unspecified HF chronicity, unspecified heart failure type (HCC)    • Dependence on intermittent renal dialysis (HCC)    • End stage renal disease (HCC)    • Anemia of renal disease    • Type 2 diabetes mellitus with hyperglycemia (HCC)    • Mixed hyperlipidemia    • Obesity    • History of cerebrovascular accident    • Major depressive disorder, recurrent, mild (HCC)    • Flaccid hemiplegia of right dominant side as late effect of  cerebral infarction (HCC)    • Longstanding persistent atrial fibrillation (HCC)    • Unspecified dementia with behavioral disturbance (HCC)    • Essential hypertension    • S/P CABG (coronary artery bypass graft)    • Chronic venous hypertension (idiopathic) with ulcer and inflammation of bilateral lower extremity (HCC)    • Coronary artery disease    • Vitamin D deficiency    • History of amputation of lesser toe (HCC)    • ANNETTE treated with BiPAP    • Diabetic neuropathy (HCC)    • Status post coronary artery stent placement      Plan:   Acute on chronic systolic congestive heart failure (HCC)    S/P CABG (coronary artery bypass graft)    Essential hypertension    Status post coronary artery stent placement    ANNETTE treated with BiPAP    Major depressive disorder, recurrent, mild (HCC)    Mixed hyperlipidemia    History of cerebrovascular accident    History of amputation of lesser toe (HCC)    Flaccid hemiplegia of right dominant side as late effect of cerebral infarction (HCC)    Diabetic neuropathy (HCC)    Unspecified dementia with behavioral disturbance (HCC)    Coronary artery disease    Obesity    Vitamin D deficiency    Chronic venous hypertension (idiopathic) with ulcer and inflammation of bilateral lower extremity (HCC)    Longstanding persistent atrial fibrillation (HCC)    Type 2 diabetes mellitus with hyperglycemia (HCC)    Anemia of renal disease    End stage renal disease (HCC)    Dependence on intermittent renal dialysis (HCC)    Congestive heart failure, unspecified HF chronicity, unspecified heart failure type (HCC)    Obesity (BMI 30-39.9)    Acute CVA (cerebrovascular accident) (HCC)    Hypothyroidism (acquired)    Vitamin B12 deficiency    Dyspnea, unspecified type    Patient seen and examined. Awake, alert. No distress. Labs reviewed. Awaiting hd today. Will continue m/w/f    Mary Harden MD

## 2022-04-05 NOTE — PLAN OF CARE
Problem: Adult Inpatient Plan of Care  Goal: Plan of Care Review  Outcome: Ongoing, Progressing  Flowsheets (Taken 4/5/2022 1648)  Progress: improving  Plan of Care Reviewed With: patient  Goal: Patient-Specific Goal (Individualized)  Outcome: Ongoing, Progressing  Goal: Absence of Hospital-Acquired Illness or Injury  Outcome: Ongoing, Progressing  Intervention: Identify and Manage Fall Risk  Recent Flowsheet Documentation  Taken 4/5/2022 1600 by Unique Florence RN  Safety Promotion/Fall Prevention:   assistive device/personal items within reach   clutter free environment maintained   safety round/check completed   nonskid shoes/slippers when out of bed   fall prevention program maintained  Taken 4/5/2022 1400 by Unique Florence RN  Safety Promotion/Fall Prevention:   assistive device/personal items within reach   clutter free environment maintained   safety round/check completed   nonskid shoes/slippers when out of bed   fall prevention program maintained  Taken 4/5/2022 1200 by Unique Florence RN  Safety Promotion/Fall Prevention:   assistive device/personal items within reach   clutter free environment maintained   safety round/check completed   nonskid shoes/slippers when out of bed  Taken 4/5/2022 1000 by Unique Florence RN  Safety Promotion/Fall Prevention:   assistive device/personal items within reach   clutter free environment maintained   safety round/check completed   nonskid shoes/slippers when out of bed   fall prevention program maintained  Taken 4/5/2022 0850 by Unique Florence RN  Safety Promotion/Fall Prevention:   assistive device/personal items within reach   clutter free environment maintained   safety round/check completed   nonskid shoes/slippers when out of bed  Intervention: Prevent and Manage VTE (Venous Thromboembolism) Risk  Recent Flowsheet Documentation  Taken 4/5/2022 0850 by Unique Florence RN  Activity Management: activity adjusted per tolerance  Goal:  Optimal Comfort and Wellbeing  Outcome: Ongoing, Progressing  Goal: Readiness for Transition of Care  Outcome: Ongoing, Progressing     Problem: Fall Injury Risk  Goal: Absence of Fall and Fall-Related Injury  Outcome: Ongoing, Progressing  Intervention: Promote Injury-Free Environment  Recent Flowsheet Documentation  Taken 4/5/2022 1600 by Unique Florence RN  Safety Promotion/Fall Prevention:   assistive device/personal items within reach   clutter free environment maintained   safety round/check completed   nonskid shoes/slippers when out of bed   fall prevention program maintained  Taken 4/5/2022 1400 by Unique Florence RN  Safety Promotion/Fall Prevention:   assistive device/personal items within reach   clutter free environment maintained   safety round/check completed   nonskid shoes/slippers when out of bed   fall prevention program maintained  Taken 4/5/2022 1200 by Unique Florence RN  Safety Promotion/Fall Prevention:   assistive device/personal items within reach   clutter free environment maintained   safety round/check completed   nonskid shoes/slippers when out of bed  Taken 4/5/2022 1000 by Unique Florence RN  Safety Promotion/Fall Prevention:   assistive device/personal items within reach   clutter free environment maintained   safety round/check completed   nonskid shoes/slippers when out of bed   fall prevention program maintained  Taken 4/5/2022 0850 by Unique Florence RN  Safety Promotion/Fall Prevention:   assistive device/personal items within reach   clutter free environment maintained   safety round/check completed   nonskid shoes/slippers when out of bed     Problem: COPD (Chronic Obstructive Pulmonary Disease) Comorbidity  Goal: Maintenance of COPD Symptom Control  Outcome: Ongoing, Progressing     Problem: Diabetes Comorbidity  Goal: Blood Glucose Level Within Targeted Range  Outcome: Ongoing, Progressing  Intervention: Monitor and Manage Glycemia  Recent Flowsheet  Documentation  Taken 4/5/2022 0850 by Unique Florence, RN  Glycemic Management: blood glucose monitored     Problem: Heart Failure Comorbidity  Goal: Maintenance of Heart Failure Symptom Control  Outcome: Ongoing, Progressing     Problem: Hypertension Comorbidity  Goal: Blood Pressure in Desired Range  Outcome: Ongoing, Progressing     Problem: Pain Chronic (Persistent) (Comorbidity Management)  Goal: Acceptable Pain Control and Functional Ability  Outcome: Ongoing, Progressing     Problem: Skin Injury Risk Increased  Goal: Skin Health and Integrity  Outcome: Ongoing, Progressing   Goal Outcome Evaluation:  Plan of Care Reviewed With: patient        Progress: improving

## 2022-04-05 NOTE — PROGRESS NOTES
Daily Progress Note        Acute on chronic systolic congestive heart failure (HCC)    S/P CABG (coronary artery bypass graft)    Essential hypertension    Status post coronary artery stent placement    ANNETTE treated with BiPAP    Major depressive disorder, recurrent, mild (HCC)    Mixed hyperlipidemia    History of cerebrovascular accident    History of amputation of lesser toe (HCC)    Flaccid hemiplegia of right dominant side as late effect of cerebral infarction (HCC)    Diabetic neuropathy (HCC)    Unspecified dementia with behavioral disturbance (HCC)    Coronary artery disease    Obesity    Vitamin D deficiency    Chronic venous hypertension (idiopathic) with ulcer and inflammation of bilateral lower extremity (HCC)    Longstanding persistent atrial fibrillation (HCC)    Type 2 diabetes mellitus with hyperglycemia (HCC)    Anemia of renal disease    End stage renal disease (HCC)    Dependence on intermittent renal dialysis (HCC)    Congestive heart failure, unspecified HF chronicity, unspecified heart failure type (HCC)    Obesity (BMI 30-39.9)    Acute CVA (cerebrovascular accident) (HCC)    Hypothyroidism (acquired)    Vitamin B12 deficiency    Dyspnea, unspecified type      Assessment    Chronic large right pleural effusion, pleural fluid is transudate with predominant lymphocytes compatible with chronicity, low pH with normal glucose, cultures are negative    Chronic obstructive pulmonary disease  Obstructive sleep apnea, noncompliant with CPAP  Pulmonary hypertension    Acute on chronic systolic CHF- Elevated proBNP> 70,000.0  Last echo 3/13/2022  -EF 26-30% with severely decreased LV systolic function  -RVSP 50  -Right ventricular cavity is mild to moderately dilated  -Moderate left pleural effusion noted     End-stage renal disease-HD M/W/F    HTN  Type 2 diabetes  CAD-S/P CABG  Hyperlipidemia  Chronic A. fib  Hypothyroidism  Tremors  Depression  Dementia  Vitamin B12 and D  deficiencies  Obesity    History of cerebral hemorrhage-residual flaccid hemiplegia right side with vision loss  Cytokine release syndrome, grade 1     3/29/2022 respiratory panel was negative     Plan     Chest tube management  Currently on waterseal, will flush catheter due to reaccumulation of pleural fluid on the chest x-ray     Continue to titrate oxygen- Currently on  3L NC and BiPAP as needed    Hemodialysis, MWF  BP control, midodrine 10 mg 3 times a day     Pulmicort nebulizer 2 times a day  DuoNebs 3 times a day  Mucinex  Synthroid 50 mcg  Electrolyte/Glycemic control  Anticoagulation Eliquis  GI prophylaxis Protonix     4/5/2022                                                                 3/29/2022          LOS: 7 days     Subjective         Objective     Vital signs for last 24 hours:  Vitals:    04/04/22 2152 04/05/22 0200 04/05/22 0459 04/05/22 0543   BP: 99/50 101/59 101/51    BP Location: Right arm Right arm Right arm    Patient Position: Lying Lying Lying    Pulse: 102 92 93    Resp: 20 18 22    Temp: 97.7 °F (36.5 °C) 98.8 °F (37.1 °C) 98.6 °F (37 °C)    TempSrc: Oral Axillary Axillary    SpO2: 90% 100% 93%    Weight:    113 kg (248 lb 7.3 oz)   Height:           Intake/Output last 3 shifts:  I/O last 3 completed shifts:  In: 840 [P.O.:840]  Out: 1474 [Other:1169; Chest Tube:305]  Intake/Output this shift:  I/O this shift:  In: 25 [P.O.:25]  Out: -       Radiology  Imaging Results (Last 24 Hours)     Procedure Component Value Units Date/Time    XR Chest 1 View [[id number]] Resulted: 04/05/22 0554     Updated: 04/05/22 0554    XR Chest 1 View [[id number]] Collected: 04/04/22 0822     Updated: 04/04/22 0829    Narrative:      DATE OF EXAM:  4/4/2022 6:00 AM     PROCEDURE:  XR CHEST 1 VW-     INDICATIONS:  Chest tube; R06.00-Dyspnea, unspecified; I50.9-Heart failure,  unspecified; J96.01-Acute respiratory failure with hypoxia; J96.02-Acute  respiratory failure with hypercapnia      COMPARISON:  4/3/2022     TECHNIQUE:   Single radiographic AP view of the chest was obtained.     FINDINGS:  Status post coronary artery bypass and stenting. Right IJ dialysis  catheter and right pleural catheter remain in place. No pneumothorax.  Heart size and pulmonary vasculature are stable. Stable strandy right  basilar airspace disease, likely atelectasis. Stable small left pleural  effusion and left lower lobe airspace consolidation. No new pulmonary  abnormality        Impression:      Stable chest. There is stable left pleural effusion with left lower lobe  airspace disease which is likely atelectasis. There is stable strandy  opacity in the right lung base compatible with atelectasis     Electronically Signed By-Norman Paige On:4/4/2022 8:24 AM  This report was finalized on [id number] by  Norman Paige, .          Labs:  Results from last 7 days   Lab Units 04/05/22  0414   WBC 10*3/mm3 5.50   HEMOGLOBIN g/dL 10.3*   HEMATOCRIT % 32.1*   PLATELETS 10*3/mm3 183     Results from last 7 days   Lab Units 04/05/22  0414   SODIUM mmol/L 135*   POTASSIUM mmol/L 4.2   CHLORIDE mmol/L 102   CO2 mmol/L 22.0   BUN mg/dL 22   CREATININE mg/dL 2.94*   CALCIUM mg/dL 8.2*   GLUCOSE mg/dL 178*     Results from last 7 days   Lab Units 03/30/22  0123   PH, ARTERIAL pH units 7.232*   PO2 ART mm Hg 77.7*   PCO2, ARTERIAL mm Hg 58.2*   HCO3 ART mmol/L 24.5                                   Meds:   SCHEDULE  apixaban, 5 mg, Oral, Q12H  atorvastatin, 40 mg, Oral, Nightly  Barium Sulfate, 1 teaspoon(s), Oral, Once in imaging  budesonide, 0.5 mg, Nebulization, BID  docusate sodium, 100 mg, Oral, Daily  donepezil, 10 mg, Oral, Nightly  guaiFENesin, 600 mg, Oral, BID  insulin lispro, 0-14 Units, Subcutaneous, 4x Daily With Meals & Nightly  ipratropium-albuterol, 3 mL, Nebulization, TID - RT  levothyroxine, 50 mcg, Oral, Q AM  metoprolol tartrate, 37.5 mg, Oral, BID  midodrine, 10 mg, Oral, TID AC  pantoprazole, 40 mg, Oral,  QAM AC  primidone, 25 mg, Oral, Daily  sertraline, 50 mg, Oral, Daily      Infusions     PRNs  dextrose  •  dextrose  •  glucagon (human recombinant)  •  heparin (porcine)  •  insulin lispro **AND** insulin lispro  •  [COMPLETED] Insert peripheral IV **AND** sodium chloride    Physical Exam:  Physical Exam  Vitals reviewed.   Pulmonary:      Breath sounds: Decreased breath sounds present.      Comments: Presence of chest tube  Musculoskeletal:         General: Swelling present.      Right lower leg: Edema present.      Left lower leg: Edema present.   Skin:     General: Skin is warm and dry.   Neurological:      Mental Status: He is alert.         ROS  Review of Systems   Respiratory: Positive for shortness of breath.    Cardiovascular: Positive for leg swelling.     I reviewed the patient's new clinical results    Electronically signed by FABIOLA Bui

## 2022-04-05 NOTE — CASE MANAGEMENT/SOCIAL WORK
Continued Stay Note  DOREEN Brennan     Patient Name: Benson Mclean  MRN: [id number]  Today's Date: 4/5/2022    Admit Date: 3/29/2022     Discharge Plan     Row Name 04/05/22 1202       Plan    Plan DC Plan: Accepted to [facility] under Medicaid. From Health system (pt's family did not want to return). No precert or PASRR required. [facility] [address] M[facility].    Patient/Family in Agreement with Plan yes    Plan Comments Received update that peer to peer was completed and denial was upheld. Per Medical Director, patient unchanged from baseline and better for long-term placement. CM confirmed with [facility] liaisonElly that patient can admit under Medicaid. Pharmacy updated for [facility] to [facility]. CM discussed dc plan and IMM letter with patient’s spouse, Melanie who was in agreement with dc plan. Confirmed with liaison that it will be private room. CM contacted Ana from [facility] to notify her of acceptance to [facility] and that OP HD would be needed. DC Barriers: Chest tube remains in place, pulmonology following.              Met with patient's spouse in her hospital room wearing PPE: mask.      Maintained distance greater than six feet and spent less than 15 minutes in the room.      Kristina Gastelum RN     Office Phone: [phone]  Office Cell: [phone]
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
## 2022-04-05 NOTE — PROGRESS NOTES
[facility]     Progress Note    Patient Name: Benson Mclean  : 1950  MRN: [id number]  Primary Care Physician:  Rudolph Rooney MD  Date of admission: 3/29/2022    Subjective   Subjective     Chief Complaint: ESRD    History of Present Illness    Patient with ESRD on hd m//    Review of Systems      Objective   Objective     Vitals:   Temp:  [97.7 °F (36.5 °C)-98.8 °F (37.1 °C)] 98.6 °F (37 °C)  Heart Rate:  [] 109  Resp:  [18-22] 20  BP: ()/(50-59) 101/51  Flow (L/min):  [3-4] 4    Physical Exam     General Appearance:  In no acute distress.    HEENT: Normal HEENT exam.     Extremities: .  There is no deformity, effusion or dependent edema.    Neurological: Patient is alert     Result Review    Result Review:  I have personally reviewed the results from the time of this admission to 2022 14:19 EDT and agree with these findings:  []  Laboratory  []  Microbiology  []  Radiology  []  EKG/Telemetry   []  Cardiology/Vascular   []  Pathology  []  Old records  []  Other:      Assessment/Plan   Assessment / Plan     Brief Patient Summary:  Benson Mclean is a 71 y.o. male who has ESRD on hd //    Active Hospital Problems:  Active Hospital Problems    Diagnosis    • **Acute on chronic systolic congestive heart failure (HCC)    • Dyspnea, unspecified type    • Vitamin B12 deficiency    • Hypothyroidism (acquired)    • Acute CVA (cerebrovascular accident) (HCC)      Right occipital with residual vision loss     • Obesity (BMI 30-39.9)    • Congestive heart failure, unspecified HF chronicity, unspecified heart failure type (HCC)    • Dependence on intermittent renal dialysis (HCC)    • End stage renal disease (HCC)    • Anemia of renal disease    • Type 2 diabetes mellitus with hyperglycemia (HCC)    • Mixed hyperlipidemia    • Obesity    • History of cerebrovascular accident    • Major depressive disorder, recurrent, mild (HCC)    • Flaccid hemiplegia of right dominant side as late  effect of cerebral infarction (HCC)    • Longstanding persistent atrial fibrillation (HCC)    • Unspecified dementia with behavioral disturbance (HCC)    • Essential hypertension    • S/P CABG (coronary artery bypass graft)    • Chronic venous hypertension (idiopathic) with ulcer and inflammation of bilateral lower extremity (HCC)    • Coronary artery disease    • Vitamin D deficiency    • History of amputation of lesser toe (HCC)    • ANNETTE treated with BiPAP    • Diabetic neuropathy (HCC)    • Status post coronary artery stent placement      Plan:   Acute on chronic systolic congestive heart failure (HCC)    S/P CABG (coronary artery bypass graft)    Essential hypertension    Status post coronary artery stent placement    ANNETTE treated with BiPAP    Major depressive disorder, recurrent, mild (HCC)    Mixed hyperlipidemia    History of cerebrovascular accident    History of amputation of lesser toe (HCC)    Flaccid hemiplegia of right dominant side as late effect of cerebral infarction (HCC)    Diabetic neuropathy (HCC)    Unspecified dementia with behavioral disturbance (HCC)    Coronary artery disease    Obesity    Vitamin D deficiency    Chronic venous hypertension (idiopathic) with ulcer and inflammation of bilateral lower extremity (HCC)    Longstanding persistent atrial fibrillation (HCC)    Type 2 diabetes mellitus with hyperglycemia (HCC)    Anemia of renal disease    End stage renal disease (HCC)    Dependence on intermittent renal dialysis (HCC)    Congestive heart failure, unspecified HF chronicity, unspecified heart failure type (HCC)    Obesity (BMI 30-39.9)    Acute CVA (cerebrovascular accident) (HCC)    Hypothyroidism (acquired)    Vitamin B12 deficiency    Dyspnea, unspecified type    Patient seen and examined. Awake, alert. No distress. Labs reviewed. S/p hd yesterday. Tolerated well. Will continue hd m/w/f    Mary Harden MD

## 2022-04-05 NOTE — PLAN OF CARE
Problem: Adult Inpatient Plan of Care  Goal: Plan of Care Review  Outcome: Ongoing, Progressing  Goal: Patient-Specific Goal (Individualized)  Outcome: Ongoing, Progressing  Goal: Absence of Hospital-Acquired Illness or Injury  Outcome: Ongoing, Progressing  Intervention: Identify and Manage Fall Risk  Recent Flowsheet Documentation  Taken 4/5/2022 0000 by Ramya Villanueva RN  Safety Promotion/Fall Prevention:   safety round/check completed   room organization consistent   assistive device/personal items within reach   clutter free environment maintained   fall prevention program maintained   nonskid shoes/slippers when out of bed  Taken 4/4/2022 2000 by Ramya Villanueva RN  Safety Promotion/Fall Prevention:   safety round/check completed   room organization consistent   nonskid shoes/slippers when out of bed   assistive device/personal items within reach   clutter free environment maintained   fall prevention program maintained  Intervention: Prevent Infection  Recent Flowsheet Documentation  Taken 4/5/2022 0000 by Ramya Villanueva RN  Infection Prevention: hand hygiene promoted  Taken 4/4/2022 2000 by Ramya Villanueva RN  Infection Prevention: hand hygiene promoted  Goal: Optimal Comfort and Wellbeing  Outcome: Ongoing, Progressing  Goal: Readiness for Transition of Care  Outcome: Ongoing, Progressing     Problem: Fall Injury Risk  Goal: Absence of Fall and Fall-Related Injury  Outcome: Ongoing, Progressing  Intervention: Identify and Manage Contributors  Recent Flowsheet Documentation  Taken 4/5/2022 0000 by Ramya Villanueva RN  Medication Review/Management: medications reviewed  Taken 4/4/2022 2000 by Ramya Villanueva RN  Medication Review/Management: medications reviewed  Intervention: Promote Injury-Free Environment  Recent Flowsheet Documentation  Taken 4/5/2022 0000 by Ramya Villanueva RN  Safety Promotion/Fall Prevention:   safety round/check completed   room organization consistent    assistive device/personal items within reach   clutter free environment maintained   fall prevention program maintained   nonskid shoes/slippers when out of bed  Taken 4/4/2022 2000 by Ramya Villanueva RN  Safety Promotion/Fall Prevention:   safety round/check completed   room organization consistent   nonskid shoes/slippers when out of bed   assistive device/personal items within reach   clutter free environment maintained   fall prevention program maintained     Problem: COPD (Chronic Obstructive Pulmonary Disease) Comorbidity  Goal: Maintenance of COPD Symptom Control  Outcome: Ongoing, Progressing  Intervention: Maintain COPD-Symptom Control  Recent Flowsheet Documentation  Taken 4/5/2022 0000 by Ramya Villanueva RN  Medication Review/Management: medications reviewed  Taken 4/4/2022 2000 by Ramya Villanueva RN  Medication Review/Management: medications reviewed     Problem: Diabetes Comorbidity  Goal: Blood Glucose Level Within Targeted Range  Outcome: Ongoing, Progressing     Problem: Heart Failure Comorbidity  Goal: Maintenance of Heart Failure Symptom Control  Outcome: Ongoing, Progressing  Intervention: Maintain Heart Failure-Management  Recent Flowsheet Documentation  Taken 4/5/2022 0000 by Ramya Villanueva RN  Medication Review/Management: medications reviewed  Taken 4/4/2022 2000 by Ramya Villanueva RN  Medication Review/Management: medications reviewed     Problem: Hypertension Comorbidity  Goal: Blood Pressure in Desired Range  Outcome: Ongoing, Progressing  Intervention: Maintain Blood Pressure Management  Recent Flowsheet Documentation  Taken 4/5/2022 0000 by Ramya Villanueva RN  Medication Review/Management: medications reviewed  Taken 4/4/2022 2000 by Ramya Villanueva RN  Medication Review/Management: medications reviewed     Problem: Pain Chronic (Persistent) (Comorbidity Management)  Goal: Acceptable Pain Control and Functional Ability  Outcome: Ongoing,  Progressing  Intervention: Manage Persistent Pain  Recent Flowsheet Documentation  Taken 4/5/2022 0000 by Ramya Villanueva, RN  Medication Review/Management: medications reviewed  Taken 4/4/2022 2000 by Ramya Villanueva, RN  Medication Review/Management: medications reviewed     Problem: Skin Injury Risk Increased  Goal: Skin Health and Integrity  Outcome: Ongoing, Progressing   Goal Outcome Evaluation:

## 2022-04-05 NOTE — SIGNIFICANT NOTE
04/05/22 1056   OTHER   Discipline occupational therapist   Rehab Time/Intention   Session Not Performed patient unavailable for treatment  (Patient off floor for swallow study)   Recommendation   OT - Next Appointment 04/06/22

## 2022-04-05 NOTE — CASE MANAGEMENT/SOCIAL WORK
Continued Stay Note  DOREEN Brennan     Patient Name: Benson Mclean  MRN: [id number]  Today's Date: 4/5/2022    Admit Date: 3/29/2022     Discharge Plan     Row Name 04/05/22 1534       Plan    Plan DC Plan: Accepted to [place] under Medicaid. From [facility] (spouse did not want return). No precert or PASRR required. OP HD referral submitted to [facility].    Plan Comments Discussed with Ana from [facility] who confirmed with [facility] [address] Clinic Manager that new referral would need to be submitted since patient now out of their system d/t receiving HD at [facility] and [facility] information now out of date. OP HD referral submitted via online [facility] Admissions [facility]. Information faxed to [facility] # [phone]. Requested Hepatitis B Antigen and Hepatitis B Antibody labs from MD in unit rounds. YOHANNES spoke with Jamaica Walden at [facility] [phone] She reported that she would have insurance re-verified and once she hears back on chair time from clinic manager, Jerilyn, she would let YOHANNES know and send a schedule letter.               Phone communication or documentation only - no physical contact with patient or family.    Kristina Gastelum RN     Office Phone: [phone]  Office Cell: [phone]

## 2022-04-05 NOTE — DISCHARGE PLACEMENT REQUEST
"Andrea Tobin (71 y.o. Male)             Date of Birth   1950    Social Security Number       Address   [address] [place] [address]    Home Phone   [phone]    MRN   [id number]       [facility]    Marital Status                               Admission Date   3/29/22    Admission Type   Emergency    Admitting Provider   Mari Bernal MD    Attending Provider   Mari Bernal MD    Department, Room/Bed   [facility] CARE, 2119/1       Discharge Date       Discharge Disposition       Discharge Destination                               Attending Provider: Mari Bernal MD    Allergies: Codeine    Isolation: None   Infection: None   Code Status: CPR   Advance Care Planning Activity    Ht: 177.8 cm (70\")   Wt: 113 kg (248 lb 7.3 oz)    Admission Cmt: None   Principal Problem: Acute on chronic systolic congestive heart failure (HCC) [I50.23]                 Active Insurance as of 3/29/2022     Primary Coverage     Payor Plan Insurance Group Employer/Plan Group    HUMANA MEDICARE REPLACEMENT HUMANA MEDICARE REPLACEMENT [id number]     Payor Plan Address Payor Plan Phone Number Payor Plan Fax Number Effective Dates    [address] [address] [phone]  1/1/2018 - None Entered    [facility] [id number]       Subscriber Name Subscriber Birth Date Member ID       ANDREA TOBIN 1950 [id number]           Secondary Coverage     Payor Plan Insurance Group Employer/Plan Group    [place]      Payor Plan Address Payor Plan Phone Number Payor Plan Fax Number Effective Dates    [address]   5/1/2021 - None Entered    [place] [address]       Subscriber Name Subscriber Birth Date Member ID       ANDREA TOBIN 1950 [id number]                 Emergency Contacts      (Rel.) Home Phone Work Phone Mobile Phone    SHAUNA TOBIN (Spouse) [phone] -- [phone]               History & " Physical      Candy Beaulieu APRN at [phone]     Attestation signed by Ivan Tapia DO at [phone]    Attending attestation:    I have reviewed the MARÍA's note and agree with the documented findings and plan of care unless stated otherwise by my note.                        [facility]      Patient Name: Benson Mclean  : 1950  MRN: [id number]  Primary Care Physician:  Rudolph Rooney MD  Date of admission: 3/29/2022      Subjective      Chief Complaint: Shortness of breath     History of Present Illness: Benson Mclean is a 71 y.o. 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  Patient is drowsy but arousable at the time of assessment he is able to state his name, date of birth, and some past medical history.  He does verbalize receiving dialysis on Monday but then falls back asleep. He denies any chest pain, palpitations,syncope, lightheaded, fever, chills, cough, abdominal pain, nausea, vomiting, diarrhea, or constipation. He does report he did have HD on Monday but is unsure how much fluid they removed.     Vital signs upon arrival blood pressure 115/60, heart rate 102, respiratory rate 20, oxygen saturation 94% on 15 L nonrebreather, patient febrile with a T-max of 99.7    Initial evaluation in the emergency department revealed the following:  Arterial blood gases-pH 7.271, PCO2 53.9, PO2 71.3, HCO3 24.8, base excess -2.9, arterial O2 saturation 91.3% CO2 content 26.5-patient was switched to precision flow 50%  FiO2 from 15 L nonrebreather.  Elevated proBNP> 70,000.0, glucose 162, sodium 133 (likely dilution), creatinine 3.55, BUN 25, GFR 17.6, RBC 3.17, hemoglobin 11, hematocrit 34.6 platelets 129  Respiratory PCR negative for COVID-19  Chest x-ray shows -chronic right  pleural effusion that has increased in size from moderate to large  with compressive atelectasis and possibly underlying airspace disease, no interval change in the small left pleural effusion with atelectasis versus airspace disease.  CT head without contrast completed shows edema secondary to cerebral atrophy, chronic ischemic changes, other acute findings noted.  EKG reviewed-repeat EKG ordered    Review of Systems   Constitutional: Positive for malaise/fatigue.   HENT: Negative.    Eyes: Negative.    Cardiovascular: Positive for dyspnea on exertion.   Respiratory: Positive for shortness of breath.    Endocrine: Negative.    Hematologic/Lymphatic: Negative.    Skin: Negative.    Musculoskeletal: Negative.    Gastrointestinal: Negative.    Genitourinary:        Anuric   Neurological: Positive for weakness.   Psychiatric/Behavioral: Negative.    Allergic/Immunologic: Negative.    All other systems reviewed and are negative.       Personal History     Past Medical History:   Diagnosis Date   • A-fib ([facility]) 8/3/2021   • Anemia    • Appetite loss    • Arthritis    • Brain bleed ([facility])    • Carpal tunnel syndrome on left    • CHF (congestive heart failure) ([facility])    • Chronic left-sided low back pain without sciatica 12/27/2017   • CKD (chronic kidney disease) stage 3, GFR 30-59 ml/min ([facility])    • Closed fracture of seventh thoracic vertebra ([facility]) 8/23/2020   • Cognitive communication deficit 12/1/2020   • COPD (chronic obstructive pulmonary disease) ([facility])    • Coronary artery disease    • COVID-19 2/19/2021   • Cytokine release syndrome, grade 1 5/24/2021   • Depression    • Diabetic neuropathy ([facility])    • Dialysis patient ([facility])     mon wed fri   • DM2 (diabetes  mellitus, type 2) ([facility])    • GERD (gastroesophageal reflux disease)    • Hyperlipidemia    • Hypertension    • Hypogonadism in male    • Neuropathy    • Nonsustained ventricular tachycardia ([facility]) 7/23/2021   • Obesity    • Paroxysmal atrial fibrillation ([facility]) 12/1/2020   • Sleep apnea    • Stroke ([facility])    • Unsteady gait        Past Surgical History:   Procedure Laterality Date   • ANGIOPLASTY      X2   • APPENDECTOMY     • ARTERIOVENOUS FISTULA/SHUNT SURGERY Left 1/20/2022    Procedure: LEFT BRACHIAL CEPHALIC ARTERIAL VENOUS FISTULA CREATION;  Surgeon: Yony Davila MD;  Location: [facility] MAIN OR;  Service: Vascular;  Laterality: Left;   • CARDIAC CATHETERIZATION  11/13/2015   • CARDIAC CATHETERIZATION N/A 5/24/2021    Procedure: Left Heart Cath and coronary angiogram;  Surgeon: Jose G Rm MD;  Location: [facility] CATH INVASIVE LOCATION;  Service: Cardiovascular;  Laterality: N/A;   • CARDIAC CATHETERIZATION  5/24/2021    Procedure: Saphenous Vein Graft;  Surgeon: Jose G Rm MD;  Location: [facility] CATH INVASIVE LOCATION;  Service: Cardiovascular;;   • CARDIAC CATHETERIZATION N/A 5/24/2021    Procedure: Percutaneous Coronary Intervention;  Surgeon: Bernabe Zambrano MD;  Location:  [facility] CATH INVASIVE LOCATION;  Service: Cardiology;  Laterality: N/A;   • CARDIAC CATHETERIZATION N/A 5/24/2021    Procedure: Stent NIELS coronary;  Surgeon: Bernabe Zambrano MD;  Location: [facility] CATH INVASIVE LOCATION;  Service: Cardiology;  Laterality: N/A;   • CARDIAC CATHETERIZATION N/A 5/26/2021    Procedure: Percutaneous Coronary Intervention;  Surgeon: Bernabe Zambrano MD;  Location: [facility] CATH INVASIVE LOCATION;  Service: Cardiovascular;  Laterality: N/A;   • CARDIAC CATHETERIZATION N/A 5/26/2021    Procedure: Stent NIELS bypass graft;  Surgeon: Bernabe Zambrano MD;  Location: [facility] CATH INVASIVE LOCATION;  Service: Cardiovascular;  Laterality: N/A;   • CARDIAC ELECTROPHYSIOLOGY PROCEDURE N/A 5/24/2021    Procedure:  Temporary Pacemaker;  Surgeon: Bernabe Zambrano MD;  Location: [facility] CATH INVASIVE LOCATION;  Service: Cardiology;  Laterality: N/A;   • CARDIAC ELECTROPHYSIOLOGY PROCEDURE N/A 5/26/2021    Procedure: Temporary Pacemaker;  Surgeon: Bernabe Zambrano MD;  Location:  [facility] CATH INVASIVE LOCATION;  Service: Cardiovascular;  Laterality: N/A;   • CARPAL TUNNEL RELEASE Left 04/29/2018    carpal tunnel- lt hand// other hand surgeries    • CATARACT EXTRACTION, BILATERAL  2002    Dr. Lux Acosta   • CHOLECYSTECTOMY     • COLON RESECTION  2005   • CORONARY ANGIOPLASTY     • CORONARY ANGIOPLASTY WITH STENT PLACEMENT  11/13/2015    PTCA stent to proximal in stent and mid to distal lad   • CORONARY ANGIOPLASTY WITH STENT PLACEMENT  09/16/2016    PTCA stent to mid lad and stent to vein graft to marginal   • CORONARY ARTERY BYPASS GRAFT  2005    @ [facility]   • CYST REMOVAL      cyst removed from scrotum   • FOOT SURGERY Right 07/17/2018   • FOOT SURGERY Left 02/04/2019   • PORTACATH PLACEMENT     • TOE AMPUTATION Right     right toe removed D/T infected cut that went to the bone       Family History: family history includes Diabetes in his brother and sister; Heart disease in his father, mother, and sister; Mental illness in his brother.   Social History:  reports that he has quit smoking. His smoking use included cigarettes. He has a 60.00 pack-year smoking history. He has never used smokeless tobacco. He reports current drug use. Frequency: 1.00 time per week. Drug: Marijuana. He reports that he does not drink alcohol.    Home Medications:  Prior to Admission Medications     Prescriptions Last Dose Informant Patient Reported? Taking?    atorvastatin (LIPITOR) 40 MG tablet   Yes No    Take 40 mg by mouth Every Night.    bisacodyl (DULCOLAX) 10 MG suppository   Yes No    Insert 10 mg into the rectum Daily As Needed for Constipation.    budesonide (Pulmicort) 0.5 MG/2ML nebulizer solution   No No    Take 2 mL by nebulization 2 (Two) Times a  Day.    cholecalciferol (VITAMIN D3) 25 MCG (1000 UT) tablet   Yes No    Take 1,000 Units by mouth Daily.    docusate sodium (COLACE) 100 MG capsule   Yes No    Take 100 mg by mouth Daily.    donepezil (ARICEPT) 10 MG tablet   Yes No    Take 10 mg by mouth Every Night.    fluticasone (FLONASE) 50 MCG/ACT nasal spray   Yes No    2 sprays by Each Nare route 2 (Two) Times a Day.    gabapentin (NEURONTIN) 100 MG capsule   Yes No    Take 100 mg by mouth 2 (Two) Times a Day.    Glucagon (Baqsimi One Pack) 3 MG/DOSE powder   Yes No    3 mg into the nostril(s) as directed by provider Every 12 (Twelve) Hours As Needed.    guaiFENesin (MUCINEX) 600 MG 12 hr tablet   No No    Take 2 tablets by mouth Every 12 (Twelve) Hours.    HYDROcodone-acetaminophen (NORCO) 7.5-325 MG per tablet   No No    Take 1 tablet by mouth Every 12 (Twelve) Hours As Needed for Moderate Pain  or Severe Pain .    insulin NPH-insulin regular (humuLIN 70/30,novoLIN 70/30) (70-30) 100 UNIT/ML injection   Yes No    Inject 10 Units under the skin into the appropriate area as directed Daily.    ipratropium-albuterol (DUO-NEB) 0.5-2.5 mg/3 ml nebulizer   No No    Take 3 mL by nebulization 3 (Three) Times a Day.    ipratropium-albuterol (DUO-NEB) 0.5-2.5 mg/3 ml nebulizer   No No    Take 3 mL by nebulization Every 2 (Two) Hours As Needed for Shortness of Air.    levothyroxine (SYNTHROID, LEVOTHROID) 50 MCG tablet   No No    Take 1 tablet by mouth Every Morning.    magnesium hydroxide (MILK OF MAGNESIA) 400 MG/5ML suspension   Yes No    Take 15 mL by mouth Daily As Needed for Constipation.    melatonin 3 MG tablet   Yes No    Take 3 mg by mouth Every Night.    Metoprolol Tartrate 37.5 MG tablet   Yes No    Take 37.5 mg by mouth 2 (Two) Times a Day. Hold for SBP <110 or HR < 60    midodrine (PROAMATINE) 10 MG tablet   Yes No    Take 10 mg by mouth 3 (Three) Times a Day Before Meals. Hold for BP > 140/90    neomycin-bacitracin-polymyxin (NEOSPORIN) 5-400-5000  ointment   No No    Apply 1 application topically to the appropriate area as directed Every Night. Apply to abrasions on right inner thigh and intact blister on left inner thigh until healed    Nitroglycerin 400 MCG pack   Yes No    Place 400 mcg under the tongue Daily As Needed (Chest pain).    omeprazole (priLOSEC) 20 MG capsule   Yes No    Take 20 mg by mouth Daily.    ondansetron ODT (ZOFRAN-ODT) 4 MG disintegrating tablet   Yes No    Place 4 mg on the tongue Every 8 (Eight) Hours As Needed for Nausea or Vomiting.    phenylephrine-mineral oil-petrolatum (Preparation H) 0.25-14-74.9 % ointment hemorrhoidal ointment   Yes No    Insert 1 application into the rectum Daily As Needed.    primidone (MYSOLINE) 50 MG tablet   Yes No    Take 25 mg by mouth Daily.    sertraline (ZOLOFT) 50 MG tablet   Yes No    Take 50 mg by mouth Daily.    vitamin B-12 (CYANOCOBALAMIN) 1000 MCG tablet   Yes No    Take 1,000 mcg by mouth Daily.            Allergies:  Allergies   Allergen Reactions   • Codeine Itching       Objective      Vitals:   Temp:  [99.7 °F (37.6 °C)] 99.7 °F (37.6 °C)  Heart Rate:  [] 100  Resp:  [20] 20  BP: (115-167)/() 115/60  Flow (L/min):  [15-50] 50    Physical Exam  Vitals and nursing note reviewed.   Constitutional:       Appearance: He is obese. He is ill-appearing.      Comments: On 50% precision flow (he did have flow oxygen)   HENT:      Head: Normocephalic and atraumatic.      Right Ear: External ear normal.      Left Ear: External ear normal.      Nose: Nose normal.      Mouth/Throat:      Mouth: Mucous membranes are dry.   Eyes:      Conjunctiva/sclera: Conjunctivae normal.   Cardiovascular:      Rate and Rhythm: Normal rate and regular rhythm.      Pulses:           Carotid pulses are 2+ on the right side and 2+ on the left side.       Radial pulses are 2+ on the right side and 2+ on the left side.        Dorsalis pedis pulses are 1+ on the right side and 1+ on the left side.         Posterior tibial pulses are 1+ on the right side and 1+ on the left side.      Heart sounds: Normal heart sounds.      Arteriovenous access: right arteriovenous access is present.     Comments: Right chest tunneled cath for HD  Pulmonary:      Effort: Pulmonary effort is normal.      Breath sounds: Examination of the right-lower field reveals rales. Examination of the left-lower field reveals rales. Rales present.   Abdominal:      General: Bowel sounds are normal.      Palpations: Abdomen is soft.   Musculoskeletal:      Cervical back: Normal range of motion and neck supple.      Right lower leg: Edema present.      Left lower leg: Edema present.   Skin:     General: Skin is warm and dry.      Coloration: Skin is pale.   Neurological:      General: No focal deficit present.      Mental Status: He is lethargic.      Motor: Weakness present.   Psychiatric:         Attention and Perception: Attention normal.         Mood and Affect: Mood normal.         Behavior: Behavior is cooperative.         Cognition and Memory: Memory is impaired.         Result Review    Result Review:  I have personally reviewed the results from the time of this admission to 3/29/2022 23:46 EDT and agree with these findings:  [x]  Laboratory  [x]  Microbiology  [x]  Radiology  [x]  EKG/Telemetry   []  Cardiology/Vascular   []  Pathology  [x]  Old records  []  Other:  Most notable findings include:     Assessment/Plan        Active Hospital Problems:  Active Hospital Problems    Diagnosis    • Chronic diastolic CHF (congestive heart failure) (HCC)    • Vitamin B12 deficiency    • Hypothyroidism (acquired)    • Acute CVA (cerebrovascular accident) (HCC)      Right occipital with residual vision loss     • Obesity (BMI 30-39.9)    • Congestive heart failure, unspecified HF chronicity, unspecified heart failure type (HCC)    • Dependence on intermittent renal dialysis (HCC)    • End stage renal disease (HCC)    • Anemia of renal disease    • Type  2 diabetes mellitus with hyperglycemia ([facility])    • Mixed hyperlipidemia    • Obesity    • History of cerebrovascular accident    • Major depressive disorder, recurrent, mild ([facility])    • Flaccid hemiplegia of right dominant side as late effect of cerebral infarction ([facility])    • Longstanding persistent atrial fibrillation ([facility])    • Unspecified dementia with behavioral disturbance ([facility])    • Essential hypertension    • S/P CABG (coronary artery bypass graft)    • Chronic venous hypertension (idiopathic) with ulcer and inflammation of bilateral lower extremity ([facility])    • Coronary artery disease    • Vitamin D deficiency    • History of amputation of lesser toe ([facility])    • ANNETTE treated with BiPAP    • Diabetic neuropathy ([facility])    • Status post coronary artery stent placement      Plan:   Acute on chronic systolic congestive heart failure   Large right pleural effusion   Cardiomyopathy  Pulmonary hypertension  Pro BNP > 70,000.0  Last echocardiogram completed March 13, 2022 LVEF 26 to 30% with severely decreased systolic function.   Secondary to hypertension  Chest x-ray shows increasing pleural effusion (Right)  Consult nephrology for dialysis orders will need fluid removal  Consider thoracentesis  Daily weights  Strict I's and O's  Continuous cardiac monitoring pulse oximetry  Supplemental oxygen as needed for hypoxia/respiratory distress maintain oxygen saturation greater than 90%  As needed ABGs for hypoxia/respiratory distress  Patient was administered IV furosemide in ED      End-stage renal disease  Chronic  Creatinine 3.55, BUN 25, GFR 17.6,  Xrpued-Dbdrccgyr-Ehwzrc  Nephrology consulted for HD orders  Strict I's and O's  Daily weights  Avoid nephrotoxic medications, hypovolemia, hypotension  Consider renally dosing all medications  Continue midodrine pending med rec verification    Hypertension of CKD  Vital signs per policy  Continue home meds    Anemia chronic kidney disease  Stable   HBG 11.0  Monitor and  transfuse if clinically appropriate    Type 2 diabetes mellitus  Complicated by neuropathy, kidney disease, and anemia of chronic disease  S/P amputation  Chronic  Last hemoglobin A1c 6.3 on March 12, 2022  Hold home NPH for now  SSI ordered  Accu-Cheks AC at bedtime  Hypoglycemia protocol ordered  S/P lesser toe toe left    Coronary Artery Disease   S/P CABG (coronary artery bypass graft)  Hyperlipidemia  Currently not on anticoagulation secondary to recent brain bleed  Continue statin pending home med rec verification    Chronic atrial fibrillation  Rate controlled  EKG completed-  EKG rate ordered  Patient not on anticoagulants secondary to paraplegia  Continue home metoprolol for rate control    Chronic obstructive pulmonary disease  Chronic  Not in exacerbation  Baseline PFTs unknown  Continue DuoNeb, guaifenesin, and inhaled steroids new med rec verification  Continuous pulse oximetry  Supplemental testing for hypoxia/respiratory distress  Encourage pulmonary hygiene-turn cough deep breathe-incentive spirometry    Obstructive sleep apnea  Noncompliant with CPAP  Encourage CPAP compliance    History of cerebral accident  Brain Bleed   Recent hospitalization March 12, 2022 through March 18, 2020  Residual flaccid hemiplegia of right dominant side, vision loss  Recent right occipital lobe ischemic CVA and right CVA territory March 16, 2022  Previous old ischemic CVA left occipital lobe  Holding into thrombus  60 to 69% stenosis of bilateral carotid arteries noted patient recommended to follow-up with vascular surgery at time of discharge on March 18    Hypothyroidism  Continue home levothyroxine pending med rec verification    Tremors  Continue primidone pending med rec verification    Major depressive disorder, recurrent, mild   Stable  Continue sertraline pending home med rec verification    Unspecified dementia with behavioral disturbance   Continue donepezil and sertraline pending home med rec  verification    Vitamin B12 and vitamin D deficiency  Continue supplements pending med rec verification    Obesity (BMI 30-39.9)  BMI 36.92  -Lifestyle modifications to reduce cardiovascular risk factors      DVT prophylaxis:  Mechanical DVT prophylaxis orders are present.    CODE STATUS:    Code Status (Patient has no pulse and is not breathing): CPR (Attempt to Resuscitate)  Medical Interventions (Patient has pulse or is breathing): Full Support    Admission Status:  I believe this patient meets observation  status.    I discussed the patient's findings and my recommendations with patient.    This patient has been examined wearing appropriate Personal Protective Equipment and udiugvgjz59/29/22      Signature: Electronically signed by FABIOLA Bernal, 03/29/22, 11:23 PM EDT.    Electronically signed by Ivan Tapia DO at 03/30/22 0737         Current Facility-Administered Medications   Medication Dose Route Frequency Provider Last Rate Last Admin   • apixaban (ELIQUIS) tablet 5 mg  5 mg Oral Q12H Ivan Tapia DO   5 mg at 04/05/22 0904   • atorvastatin (LIPITOR) tablet 40 mg  40 mg Oral Nightly Ivan Tapia DO   40 mg at 04/04/22 2138   • Barium Sulfate 60 % cream 1 teaspoon(s)  1 teaspoon(s) Oral Once in imaging Ivan Tapia DO       • budesonide (PULMICORT) nebulizer solution 0.5 mg  0.5 mg Nebulization BID Ivan Tapia DO   0.5 mg at 04/05/22 0806   • dextrose (D50W) (25 g/50 mL) IV injection 25 g  25 g Intravenous Q15 Min PRN Candy Beaulieu APRN   25 g at 03/31/22 1951   • dextrose (GLUTOSE) oral gel 15 g  15 g Oral Q15 Min PRN Candy Beaulieu APRN       • docusate sodium (COLACE) capsule 100 mg  100 mg Oral Daily Ivan Tapia DO   100 mg at 04/05/22 0905   • donepezil (ARICEPT) tablet 10 mg  10 mg Oral Nightly Ivan Tapia DO   10 mg at 04/04/22 2137   • glucagon (human recombinant) (GLUCAGEN DIAGNOSTIC) 1 mg in sterile water (preservative free) 1 mL injection  1 mg  Subcutaneous PRN Candy Beaulieu APRN       • guaiFENesin (MUCINEX) 12 hr tablet 600 mg  600 mg Oral BID Ivan Tapia, DO   600 mg at 04/05/22 0905   • heparin (porcine) injection 4,000 Units  4,000 Units Intracatheter PRN Yony Bhat MD   4,000 Units at 04/04/22 1427   • insulin lispro (ADMELOG) injection 0-14 Units  0-14 Units Subcutaneous 4x Daily With Meals & Nightly Ivan Tapia DO   3 Units at 04/03/22 1307    And   • insulin lispro (ADMELOG) injection 0-14 Units  0-14 Units Subcutaneous PRN Ivan Tapia DO       • ipratropium-albuterol (DUO-NEB) nebulizer solution 3 mL  3 mL Nebulization TID - RT Celi Garcia APRN   3 mL at 04/05/22 0810   • levothyroxine (SYNTHROID, LEVOTHROID) tablet 50 mcg  50 mcg Oral Q AM Ivan Tapia, DO   50 mcg at 04/05/22 0535   • metoprolol tartrate (LOPRESSOR) half tablet 37.5 mg  37.5 mg Oral BID Tamiko Tapiaram, DO   37.5 mg at 04/05/22 0905   • midodrine (PROAMATINE) tablet 10 mg  10 mg Oral TID AC Tamiko Tapiaram, DO   10 mg at 04/05/22 0904   • pantoprazole (PROTONIX) EC tablet 40 mg  40 mg Oral QAM AC Tamiko Tapiaram, DO   40 mg at 04/05/22 0905   • primidone (MYSOLINE) tablet 25 mg  25 mg Oral Daily Tamiko Tapiaram, DO   25 mg at 04/05/22 0908   • sertraline (ZOLOFT) tablet 50 mg  50 mg Oral Daily Tamiko Tapiaram, DO   50 mg at 04/05/22 0905   • sodium chloride 0.9 % flush 10 mL  10 mL Intravenous PRN Davis Zavala MD   10 mL at 04/05/22 0905           Hemodialysis Inpatient [DIA12] (Order [id number])  Order  Date: 4/3/2022 Department: [facility] PROGRESS CARE Ordering/Authorizing: Katerina Beyer MD                       Standing Order Information    Remaining Occurrences Interval Last Released     0/1 Once 4/3/2022                    Order History  Inpatient  Date/Time Action Taken User Additional Information   04/03/22 1857 Sign Katerina Beyer MD    04/03/22 1857 Release Instance Katerina Beyer MD (auto-released) Released  Order:[id number]   04/03/22 2025 Acknowledge Gloria Byrne RN New Order     Order Details    Frequency Duration Priority Order Class   Once 1  occurrence Routine Hospital Performed       Start Date/Time    Start Date Start Time   04/03/22 1858       Order Information    Order Date Service Start Date Start Time End Date   04/03/22 Medicine 04/03/22 1858 04/03/22           Reference Links    Associated Reports   View Encounter       Order Questions    Question Answer   Duration of Treatment 3.5 Hours   Access Site Tunneled Dialysis Catheter   Dialyzer F160    mL/min   Dialysate Temperature (C) 35.5   BFR-As tolerated to a maximum of: 400 mL/min   Dialysate Solution Bath: K+ = 3 mEq, CA = 2.5mg   Bicarb 30 meq   Na+ 137 meq   Fluid Removal: 2   Notify [place] Department of order. Who did you speak with? carter - kevin velez already called in orders for 4/3              Task Completed    Task Completed by Date/Time   Notify Dialysis Department of Hemodialysis Inpatient Susi Clemons 04/03/22 2024             Source Order Set / Preference List    Order Set   [facility] GEN HEMODIALYSIS/ULTRAFILTRATION FOCUSED [[id number]]                               Acknowledgement Info    For At Acknowledged By Acknowledged On   Placing Order 04/03/22 1857 Gloria Byrne RN 04/03/22 2025           Reprint Order Requisition    Hemodialysis Inpatient (Order #[id number]) on 4/3/22         Encounter    View Encounter                Order Provider Info        Office phone Pager E-mail   Ordering User Katerina Beyer [phone] -- --   Authorizing Provider Katerina Beyer [phone] --   Attending Provider When Ordered Ivan Tapia [phone] --   Current Attending Provider Mari Bernal [phone] -- --       Tracking Reports    Cosign Tracking Order Transmittal Tracking     Authorized by:  Katerina Beyer MD  (NPI: [id number])                Lab Component SmartPhrase Guide    Hemodialysis  Inpatient (Order #[id number]) on 4/3/22           Hemodialysis Inpatient [DIA12] (Order [id number])  Order  Date: 4/3/2022 Department: [facility] PROGRESS CARE Ordering/Authorizing: Katerina Beyer MD                       Standing Order Information    Remaining Occurrences Interval Last Released     0/1 Once 4/3/2022                    Order History  Inpatient  Date/Time Action Taken User Additional Information   04/03/22 1857 Sign Katerina Beyer MD    04/03/22 1857 Release Instance Katerina Beyer MD (auto-released) Released Order:[id number]   04/03/22 2025 Acknowledge Gloria Byrne RN New Order     Order Details    Frequency Duration Priority Order Class   Once 1  occurrence Routine Hospital Performed       Start Date/Time    Start Date Start Time   04/03/22 1858       Order Information    Order Date Service Start Date Start Time End Date   04/03/22 Medicine 04/03/22 1858 04/03/22           Reference Links    Associated Reports   View Encounter       Order Questions    Question Answer   Duration of Treatment 3.5 Hours   Access Site Tunneled Dialysis Catheter   Dialyzer F160    mL/min   Dialysate Temperature (C) 35.5   BFR-As tolerated to a maximum of: 400 mL/min   Dialysate Solution Bath: K+ = 3 mEq, CA = 2.5mg   Bicarb 30 meq   Na+ 137 meq   Fluid Removal: 2   Notify [place] Department of order. Who did you speak with? carter velez already called in orders for 4/3              Task Completed    Task Completed by Date/Time   Notify Dialysis Department of Hemodialysis Inpatient Susi Clemons 04/03/22 2024             Source Order Set / Preference List    Order Set   [facility] GEN HEMODIALYSIS/ULTRAFILTRATION FOCUSED [[id number]]                               Acknowledgement Info    For At Acknowledged By Acknowledged On   Placing Order 04/03/22 1857 Gloria Byrne RN 04/03/22 2025           Reprint Order Requisition    Hemodialysis Inpatient (Order #[id number]) on 4/3/22          Encounter    View Encounter                Order Provider Info        Office phone Pager E-mail   Ordering User Katerina Beyer [phone] -- --   Authorizing Provider Katerina Beyer [phone] -- --   Attending Provider When Ordered Ivan Tapia [phone] --   Current Attending Provider Mari Bernal [phone] -- --       Tracking Reports    Cosign Tracking Order Transmittal Tracking     Authorized by:  Katerina Beyer MD  (NPI: [id number])                Lab Component SmartPhrase Guide    Hemodialysis Inpatient (Order #[id number]) on 4/3/22           Mary Harden MD    Physician   Nephrology   Progress Notes       Signed   Date of Service:  22   Creation Time:  22              Signed        Expand All Collapse All        Show:Clear all  [x]Manual[x]Template[x]Copied    Added by:  [x]Mary Harden MD      [][facility] for [facility]     [facility]     Progress Note     Patient Name: Benson Mclean  : 1950  MRN: [id number]  Primary Care Physician:  Rudolph Rooney MD  Date of admission: 3/29/2022        Subjective      Subjective      Chief Complaint: ESRD     History of Present Illness  Patient with ESRD on hd m/w/f     Review of Systems           Objective      Objective      Vitals:   Temp:  [97.5 °F (36.4 °C)-99.3 °F (37.4 °C)] 99 °F (37.2 °C)  Heart Rate:  [] 111  Resp:  [16-18] 18  BP: ()/(38-60) 109/53  Flow (L/min):  [2] 2     Physical Exam              General Appearance:  In no acute distress.    HEENT: Normal HEENT exam.     Extremities: .  There is no deformity, effusion or dependent edema.    Neurological: Patient is alert            Result Review       Result Review:  I have personally reviewed the results from the time of this admission to 2022 07:49 EDT and agree with these findings:  []?  Laboratory  []?  Microbiology  []?  Radiology  []?  EKG/Telemetry   []?  Cardiology/Vascular   []?  Pathology  []?  Old  records  []?  Other:              Assessment/Plan      Assessment / Plan      Brief Patient Summary:  Benson Mclean is a 71 y.o. male who has ESRD on hd m/w/f     Active Hospital Problems:        Active Hospital Problems     Diagnosis     • **Acute on chronic systolic congestive heart failure (HCC)     • Dyspnea, unspecified type     • Vitamin B12 deficiency     • Hypothyroidism (acquired)     • Acute CVA (cerebrovascular accident) (HCC)         Right occipital with residual vision loss      • Obesity (BMI 30-39.9)     • Congestive heart failure, unspecified HF chronicity, unspecified heart failure type (HCC)     • Dependence on intermittent renal dialysis (HCC)     • End stage renal disease (HCC)     • Anemia of renal disease     • Type 2 diabetes mellitus with hyperglycemia (HCC)     • Mixed hyperlipidemia     • Obesity     • History of cerebrovascular accident     • Major depressive disorder, recurrent, mild (HCC)     • Flaccid hemiplegia of right dominant side as late effect of cerebral infarction ([facility])     • Longstanding persistent atrial fibrillation (HCC)     • Unspecified dementia with behavioral disturbance ([facility])     • Essential hypertension     • S/P CABG (coronary artery bypass graft)     • Chronic venous hypertension (idiopathic) with ulcer and inflammation of bilateral lower extremity ([facility])     • Coronary artery disease     • Vitamin D deficiency     • History of amputation of lesser toe (HCC)     • ANNETTE treated with BiPAP     • Diabetic neuropathy (HCC)     • Status post coronary artery stent placement        Plan:   Acute on chronic systolic congestive heart failure (HCC)    S/P CABG (coronary artery bypass graft)    Essential hypertension    Status post coronary artery stent placement    ANNETTE treated with BiPAP    Major depressive disorder, recurrent, mild (HCC)    Mixed hyperlipidemia    History of cerebrovascular accident    History of amputation of lesser toe (HCC)    Flaccid hemiplegia of right  dominant side as late effect of cerebral infarction (HCC)    Diabetic neuropathy (HCC)    Unspecified dementia with behavioral disturbance (HCC)    Coronary artery disease    Obesity    Vitamin D deficiency    Chronic venous hypertension (idiopathic) with ulcer and inflammation of bilateral lower extremity (HCC)    Longstanding persistent atrial fibrillation (HCC)    Type 2 diabetes mellitus with hyperglycemia (HCC)    Anemia of renal disease    End stage renal disease (HCC)    Dependence on intermittent renal dialysis (HCC)    Congestive heart failure, unspecified HF chronicity, unspecified heart failure type (HCC)    Obesity (BMI 30-39.9)    Acute CVA (cerebrovascular accident) (HCC)    Hypothyroidism (acquired)    Vitamin B12 deficiency    Dyspnea, unspecified type     Patient seen and examined. Awake, alert. No distress. Labs reviewed. Awaiting hd today. Will continue m/w/f     Mary Harden MD

## 2022-04-05 NOTE — PLAN OF CARE
Goal Outcome Evaluation:  Pt seen upright in the lateral projection and given trials of NTL by cup x2, puree x3, mechanical ground (mixed) x2, soft solids x1, thin liquids by spoon x2, thin liquids by cup x2, thin liquids by straw x2, and thin liquids by straw w/ chin tuck x3. Pt required feeding assistance from SLP coordinator w/ all trials.  Overall reduced hyolaryngeal elevation and epiglottic deflection across all consistencies, resulting in silent aspiration during the swallow w/ second trial of thin liquids by straw. Penetration during the swallow, which cleared after the swallow, observed with first trial of thin liquids by cup and straw. Thin liquids by straw w/ chin tuck revealed transient penetration w/ 1 out of 3 trials. No penetration or aspiration observed w/ any other trial/consistency.  Extended mastication observed with soft solids. Recommend pt initiate a  ground and thin liquid diet w/ NO straws. Pt should follow strict aspiration precautions and take SMALL bites/sips. ST will continue to follow to ensure diet tolerance and make further recs as indicated.

## 2022-04-06 NOTE — PLAN OF CARE
Goal Outcome Evaluation:            Assessment: Benson Mclean presents with functional mobility impairments which indicate the need for skilled intervention. Tolerating session today without incident. Will continue to follow and progress as tolerated.     Plan/Recommendations:   Pt would benefit from Inpatient Rehabilitation placement at discharge from facility and requires no DME at discharge.   Pt desires Inpatient Rehabilitation placement at discharge. Pt cooperative; agreeable to therapeutic recommendations and plan of care.

## 2022-04-06 NOTE — PROGRESS NOTES
[facility] Medicine Services Daily Progress Note    Patient Name: Benson Mclean  : 1950  MRN: [id number]  Primary Care Physician:  Rudolph Rooney MD  Date of admission: 3/29/2022      Subjective      Chief Complaint: Shortness of breath    Patient seen and examined this morning.  Doing well, breathing continues to improve. No other complaints.    Pertinent positives as noted in HPI/subjective.  All other systems were reviewed and are negative.      Objective      Vitals:   Temp:  [97.8 °F (36.6 °C)-99 °F (37.2 °C)] 97.8 °F (36.6 °C)  Heart Rate:  [] 111  Resp:  [16-22] 16  BP: ()/(44-59) 90/44  Flow (L/min):  [3-4] 3    Physical Exam:    General: Awake and alert, lying in bed, chronically ill-appearing, NAD  Eyes: PERRL, EOMI, conjunctive are clear  Cardiovascular: Regular rate and rhythm, no murmurs  Respiratory: Decreased breath sounds bilaterally, improving, no wheezing, unlabored breathing  Abdomen: Soft, obese, nontender, positive bowel sounds, no guarding  Neurologic: A&O, CN grossly intact, moves all extremities spontaneously  Musculoskeletal: Generalized weakness with right greater than left noted, no gross deformities  Skin: Warm, dry, intact         Result Review    Result Review:  I have personally reviewed the results from the time of this admission to 2022 22:45 EDT and agree with these findings:  [x]  Laboratory  [x]  Microbiology  [x]  Radiology  [x]  EKG/Telemetry   []  Cardiology/Vascular   []  Pathology  []  Old records  []  Other:    Wounds (last 24 hours)     LDA Wound     Row Name 22 0850 22 0400 22 0000       Wound 22 0340 perineum Pressure Injury    Wound - Properties Group Placement Date: 22  -AS Placement Time: 340  -AS Present on Hospital Admission: Y  -AS Location: perineum  -AS Primary Wound Type: Pressure inj  -AS    Pressure Injury Stage 2  -AC -- --    Dressing Appearance open to air  -AC -- --    Base  red  shearing  -AC red  -DM red  -DM    Periwound Temperature warm  -AC -- --    Periwound Skin Turgor soft  -AC -- --    Drainage Amount none  -AC none  -DM none  -DM    Care, Wound cleansed with;soap and water  -AC -- --    Dressing Care skin barrier agent applied  -AC -- --    Periwound Care cleansed with pH balanced cleanser  -AC -- --    Retired Wound - Properties Group Placement Date: 03/30/22  -AS Placement Time: 0340  -AS Present on Hospital Admission: Y  -AS Location: perineum  -AS Primary Wound Type: Pressure inj  -AS    Retired Wound - Properties Group Date first assessed: 03/30/22  -AS Time first assessed: 0340  -AS Present on Hospital Admission: Y  -AS Location: perineum  -AS Primary Wound Type: Pressure inj  -AS       Wound 03/30/22 0341 Left posterior thigh    Wound - Properties Group Placement Date: 03/30/22  -AS Placement Time: 0341  -AS Present on Hospital Admission: Y  -AS Side: Left  -AS Orientation: posterior  -AS Location: thigh  -AS    Dressing Appearance open to air  -AC -- --    Base scab  -AC scab  -DM scab  -DM    Retired Wound - Properties Group Placement Date: 03/30/22  -AS Placement Time: 0341  -AS Present on Hospital Admission: Y  -AS Side: Left  -AS Orientation: posterior  -AS Location: thigh  -AS    Retired Wound - Properties Group Date first assessed: 03/30/22  -AS Time first assessed: 0341  -AS Present on Hospital Admission: Y  -AS Side: Left  -AS Location: thigh  -AS       Wound 03/30/22 0342 Right popliteal    Wound - Properties Group Placement Date: 03/30/22  -AS Placement Time: 0342  -AS Present on Hospital Admission: Y  -AS Side: Right  -AS Location: popliteal  -AS    Closure None  -AC None  -DM None  -DM    Retired Wound - Properties Group Placement Date: 03/30/22  -AS Placement Time: 0342  -AS Present on Hospital Admission: Y  -AS Side: Right  -AS Location: popliteal  -AS    Retired Wound - Properties Group Date first assessed: 03/30/22  -AS Time first assessed: 0342  -AS  Present on Hospital Admission: Y  -AS Side: Right  -AS Location: popliteal  -AS       Wound 03/30/22 0343 Left heel    Wound - Properties Group Placement Date: 03/30/22  -AS Placement Time: 0343  -AS Present on Hospital Admission: Y  -AS Side: Left  -AS Location: heel  -AS    Base blanchable;red  -AC blanchable;red  -DM blanchable;red  -DM    Retired Wound - Properties Group Placement Date: 03/30/22  -AS Placement Time: 0343  -AS Present on Hospital Admission: Y  -AS Side: Left  -AS Location: heel  -AS    Retired Wound - Properties Group Date first assessed: 03/30/22  -AS Time first assessed: 0343  -AS Present on Hospital Admission: Y  -AS Side: Left  -AS Location: heel  -AS       Wound 03/30/22 0348 Right anterior ankle    Wound - Properties Group Placement Date: 03/30/22  -AS Placement Time: 0348  -AS Present on Hospital Admission: Y  -AS Side: Right  -AS Orientation: anterior  -AS Location: ankle  -AS    Closure None  -AC None  -DM None  -DM    Base scab  -AC scab  -DM scab  -DM    Retired Wound - Properties Group Placement Date: 03/30/22  -AS Placement Time: 0348  -AS Present on Hospital Admission: Y  -AS Side: Right  -AS Orientation: anterior  -AS Location: ankle  -AS    Retired Wound - Properties Group Date first assessed: 03/30/22  -AS Time first assessed: 0348  -AS Present on Hospital Admission: Y  -AS Side: Right  -AS Location: ankle  -AS       Wound 03/30/22 0349 Left antecubital    Wound - Properties Group Placement Date: 03/30/22  -AS Placement Time: 0349  -AS Present on Hospital Admission: Y  -AS Side: Left  -AS Location: antecubital  -AS    Retired Wound - Properties Group Placement Date: 03/30/22  -AS Placement Time: 0349  -AS Present on Hospital Admission: Y  -AS Side: Left  -AS Location: antecubital  -AS    Retired Wound - Properties Group Date first assessed: 03/30/22  -AS Time first assessed: 0349  -AS Present on Hospital Admission: Y  -AS Side: Left  -AS Location: antecubital  -AS          User  Key  (r) = Recorded By, (t) = Taken By, (c) = Cosigned By    Initials Name Provider Type    Unique Julio, RN Registered Nurse    AS Maricruz Mi, RN Registered Nurse    aRmya Hanks, RN Registered Nurse                  Assessment/Plan      Brief Patient Summary:  Benson Mclean is a 71 y.o. male with multiple medical issues including ESRD on HD Mlcyiv-Xlkramlsl-Wpbctt, chronic systolic congestive heart failure, CAD s/p CABG, cardiac stent, chronic atrial fibrillation, CVA with residual right-sided weakness, COPD on chronic oxygen, ANNETTE on CPap, previous PE,  HTN, HLD, type 2 diabetes mellitus complicated by neuropathy, anemia of renal disease, vitamin deficiencies, and dementia who presented to [facility] on 3/29/2022 complaining of shortness of breath.    Patient is poor historian but states he has been feeling shortness of breath for past few days.  Unknown of last dialysis.  Was recently here for CHF exacerbation and was diagnosed with ischemic CVA at the time with mild hemorrhagic conversion.  Was discharged to rehab and plan for restarting Eliquis if repeat CT head is stable per neurology recommendations.  CT of the head on admission is stable.  On admission patient severely hypoxic and placed on high flow oxygen.  Imaging report noted to have large right-sided pleural effusion, pulmonology and nephrology consulted.  Chest tube placed by pulmonology on 3/30.  Transudate fluid with Cultures negative so far.      apixaban, 5 mg, Oral, Q12H  atorvastatin, 40 mg, Oral, Nightly  Barium Sulfate, 1 teaspoon(s), Oral, Once in imaging  budesonide, 0.5 mg, Nebulization, BID  docusate sodium, 100 mg, Oral, Daily  donepezil, 10 mg, Oral, Nightly  guaiFENesin, 600 mg, Oral, BID  insulin lispro, 0-14 Units, Subcutaneous, 4x Daily With Meals & Nightly  ipratropium-albuterol, 3 mL, Nebulization, TID - RT  levothyroxine, 50 mcg, Oral, Q AM  metoprolol tartrate, 37.5 mg, Oral, BID  midodrine, 10  mg, Oral, TID AC  pantoprazole, 40 mg, Oral, QAM AC  primidone, 25 mg, Oral, Daily  sertraline, 50 mg, Oral, Daily             Active Hospital Problems:  Active Hospital Problems    Diagnosis    • **Acute on chronic systolic congestive heart failure (HCC)    • Dyspnea, unspecified type    • Vitamin B12 deficiency    • Hypothyroidism (acquired)    • Acute CVA (cerebrovascular accident) ([facility])      Right occipital with residual vision loss     • Obesity (BMI 30-39.9)    • Congestive heart failure, unspecified HF chronicity, unspecified heart failure type ([facility])    • Dependence on intermittent renal dialysis ([facility])    • End stage renal disease ([facility])    • Anemia of renal disease    • Type 2 diabetes mellitus with hyperglycemia ([facility])    • Mixed hyperlipidemia    • Obesity    • History of cerebrovascular accident    • Major depressive disorder, recurrent, mild ([facility])    • Flaccid hemiplegia of right dominant side as late effect of cerebral infarction ([facility])    • Longstanding persistent atrial fibrillation ([facility])    • Unspecified dementia with behavioral disturbance ([facility])    • Essential hypertension    • S/P CABG (coronary artery bypass graft)    • Chronic venous hypertension (idiopathic) with ulcer and inflammation of bilateral lower extremity ([facility])    • Coronary artery disease    • Vitamin D deficiency    • History of amputation of lesser toe ([facility])    • ANNETTE treated with BiPAP    • Diabetic neuropathy ([facility])    • Status post coronary artery stent placement      Plan:     Acute on chronic hypoxic and hypercapnic respiratory failure, improving  Large right-sided pleural effusion  Acute on chronic HFrEF  -Multifactorial with underlying ANNETTE, and CHF with morbid obesity  -Imaging reports noted  -s/p right chest tube placement by pulmonology on 3/30, transudate fluid noted  -ultrafiltration per nephrology  -Patient does not make urine, Lasix discontinued  -Continue other home meds  -Weaned down to nasal cannula  -Pulmonology  following, palliative care following, remains full code     ESRD on HD  -Ultrafiltration as tolerated  -Midodrine for blood pressure support  -Continue dialysis support per nephrology    Recent CVA  -Diagnosed during last admission, previous imaging reports and discharge summary noted  -Has some residual right-sided deficits  -Repeat CT head this admission is stable, no hemorrhage noted  -Per previous notes, plan was to start Eliquis for his A. fib if repeat CT is stable per neurology recommendations  -Started on Eliquis this admission    PAF  -Rate controlled currently, continue home meds  -Eliquis started  -Monitor on telemetry     DM2  Dysphagia  -Patient much more awake and alert, s/p VFSS, on diet per ST  -Basal insulin stopped due to hypoglycemia, resume when able  -Continue sliding scale, monitor blood glucose  -Adjust as needed    Hypothyroidism  -Continue home meds     Major depressive disorder, recurrent, mild   -Continue home meds, monitor     Obesity (BMI 30-39.9)  BMI 36.92  -Lifestyle modifications to reduce cardiovascular risk factors    DVT ppx  -Eliquis       CODE STATUS:    Code Status (Patient has no pulse and is not breathing): CPR (Attempt to Resuscitate)  Medical Interventions (Patient has pulse or is breathing): Full Support      Disposition: Rehab placement, needs pre-CERT    Electronically signed by Mari Bernal MD, 04/05/22, 22:45 EDT.  [facility] Hospitalist Team

## 2022-04-06 NOTE — CONSULTS
Nutrition Services    Patient Name: Benson Mclean  YOB: 1950  MRN: [id number]  Admission date: 3/29/2022    Comment:      PPE Documentation        PPE Worn By Provider mask, gloves and eye protection   PPE Worn By Patient  None      CLINICAL NUTRITION ASSESSMENT      Reason for Assessment Follow up protocol     3/30: MST of 2, Wounds      H&P  71 y.o. male with multiple medical issues including ESRD on HD Fajdin-Zymdbikhm-Zrbvqn, chronic systolic congestive heart failure, CAD s/p CABG, cardiac stent, chronic atrial fibrillation, CVA with residual right-sided weakness, COPD on chronic oxygen, ANNETTE on CPap, previous PE,  HTN, HLD, type 2 diabetes mellitus complicated by neuropathy, anemia of renal disease, vitamin deficiencies, and dementia who presented to [facility] on 3/29/2022 complaining of shortness of breath.     Past Medical History:   Diagnosis Date   • A-fib ([facility]) 8/3/2021   • Anemia    • Appetite loss    • Arthritis    • Brain bleed ([facility])    • Carpal tunnel syndrome on left    • CHF (congestive heart failure) ([facility])    • Chronic left-sided low back pain without sciatica 12/27/2017   • CKD (chronic kidney disease) stage 3, GFR 30-59 ml/min ([facility])    • Closed fracture of seventh thoracic vertebra ([facility]) 8/23/2020   • Cognitive communication deficit 12/1/2020   • COPD (chronic obstructive pulmonary disease) ([facility])    • Coronary artery disease    • COVID-19 2/19/2021   • Cytokine release syndrome, grade 1 5/24/2021   • Depression    • Diabetic neuropathy ([facility])    • Dialysis patient ([facility])     mon wed fri   • DM2 (diabetes mellitus, type 2) ([facility])    • GERD (gastroesophageal reflux disease)    • Hyperlipidemia    • Hypertension    • Hypogonadism in male    • Neuropathy    • Nonsustained ventricular tachycardia ([facility]) 7/23/2021   • Obesity    • Paroxysmal atrial fibrillation ([facility]) 12/1/2020   • Sleep apnea    • Stroke ([facility])    • Unsteady gait        Past Surgical History:   Procedure  Laterality Date   • ANGIOPLASTY      X2   • APPENDECTOMY     • ARTERIOVENOUS FISTULA/SHUNT SURGERY Left 1/20/2022    Procedure: LEFT BRACHIAL CEPHALIC ARTERIAL VENOUS FISTULA CREATION;  Surgeon: Yony Davila MD;  Location: [facility] MAIN OR;  Service: Vascular;  Laterality: Left;   • CARDIAC CATHETERIZATION  11/13/2015   • CARDIAC CATHETERIZATION N/A 5/24/2021    Procedure: Left Heart Cath and coronary angiogram;  Surgeon: Jose G Rm MD;  Location:  [facility] CATH INVASIVE LOCATION;  Service: Cardiovascular;  Laterality: N/A;   • CARDIAC CATHETERIZATION  5/24/2021    Procedure: Saphenous Vein Graft;  Surgeon: Jose G Rm MD;  Location:  [facility] C[facility] INVASIVE [facility];  Service: Cardiovascular;;   • CARDIAC CATHETERIZATION N/A 5/24/2021    Procedure: Percutaneous Coronary Intervention;  Surgeon: Bernabe Zambrano MD;  Location:  [facility] C[facility] INVASIVE [facility];  Service: Cardiology;  Laterality: N/A;   • CARDIAC CATHETERIZATION N/A 5/24/2021    Procedure: Stent NIELS coronary;  Surgeon: Bernabe Zambrano MD;  Location:  [facility]VASIVE [facility];  Service: Cardiology;  Laterality: N/A;   • CARDIAC CATHETERIZATION N/A 5/26/2021    Procedure: Percutaneous Coronary Intervention;  Surgeon: Bernabe Zambrano MD;  Location:  [facility];  Service: Cardiovascular;  Laterality: N/A;   • CARDIAC CATHETERIZATION N/A 5/26/2021    Procedure: Stent NIELS bypass graft;  Surgeon: Bernabe Zambrano MD;  Location:  [facility];  Service: Cardiovascular;  Laterality: N/A;   • CARDIAC ELECTROPHYSIOLOGY PROCEDURE N/A 5/24/2021    Procedure: Temporary Pacemaker;  Surgeon: Bernabe Zambrano MD;  Location:  [facility];  Service: Cardiology;  Laterality: N/A;   • CARDIAC ELECTROPHYSIOLOGY PROCEDURE N/A 5/26/2021    Procedure: Temporary Pacemaker;  Surgeon: Bernabe Zambrano MD;  Location:  [facility];  Service: Cardiovascular;  Laterality: N/A;   • CARPAL TUNNEL RELEASE Left  "04/29/2018    carpal tunnel- lt hand// other hand surgeries    • CATARACT EXTRACTION, BILATERAL  2002    Dr. Lux Acosta   • CHOLECYSTECTOMY     • COLON RESECTION  2005   • CORONARY ANGIOPLASTY     • CORONARY ANGIOPLASTY WITH STENT PLACEMENT  11/13/2015    PTCA stent to proximal in stent and mid to distal lad   • CORONARY ANGIOPLASTY WITH STENT PLACEMENT  09/16/2016    PTCA stent to mid lad and stent to vein graft to marginal   • CORONARY ARTERY BYPASS GRAFT  2005    @ [facility]   • CYST REMOVAL      cyst removed from scrotum   • FOOT SURGERY Right 07/17/2018   • FOOT SURGERY Left 02/04/2019   • PORTACATH PLACEMENT     • TOE AMPUTATION Right     right toe removed D/T infected cut that went to the bone        Current Problems   Acute on chronic systolic congestive heart failure   Large right pleural effusion   Cardiomyopathy  Pulmonary hypertension    End-stage renal disease    Hypertension of CKD    Anemia chronic kidney disease    Type 2 diabetes mellitus  Last hemoglobin A1c 6.3 on March 12, 2022    Coronary Artery Disease     Chronic atrial fibrillation    Chronic obstructive pulmonary disease    Obstructive sleep apnea    History of cerebral accident  Brain Bleed     Hypothyroidism    Tremors    Major depressive disorder, recurrent, mild     Unspecified dementia with behavioral disturbance     Vitamin B12 and vitamin D deficiency       Encounter Information        Trending Narrative     4/6: SLP continues to follow patient, last saw today and continues to recommend mechanical ground with thin liquids.  RD visited patient at bedside.  Patient states good appetite, no N/V, no chewing/swallowing issues noted. Patient complains about current diet consistency (mechanical ground) and calls it \"garbage\".  Patient complains that his wife (also admitted at this hospital) is on regular consistency diet.  RD helped patient dial the kitchen so he can order food for lunch.  Working with PT/OT also.  Plans to D/C to rehab.      3/30: " "RD visited patient at bedside.  Noted patient has dementia, did not answer questions appropriately.       Anthropometrics        Current Height, Weight Height: 177.8 cm (70\")  Weight: 112 kg (246 lb 0.5 oz) (04/06/22 0500)       Ideal Body Weight (IBW) 166#   Usual Body Weight (UBW) Unable to obtain from patient        Trending Weight Hx     This admission: 4/6: 4.2% weight loss since last full review (-5.6L per I/Os)    3/30: 257#              PTA: Weight gain x 6 months   6.8% weight loss x 1 year     Wt Readings from Last 30 Encounters:   04/06/22 0500 112 kg (246 lb 0.5 oz)   04/05/22 0543 113 kg (248 lb 7.3 oz)   04/04/22 0500 113 kg (248 lb 7.3 oz)   04/03/22 0454 108 kg (238 lb 8.6 oz)   03/31/22 0522 108 kg (238 lb 8.6 oz)   03/30/22 0500 117 kg (257 lb 4.4 oz)   03/30/22 0147 117 kg (257 lb 4.4 oz)   03/29/22 2047 117 kg (257 lb)   03/18/22 0540 117 kg (257 lb 3.2 oz)   03/16/22 0529 116 kg (254 lb 13.6 oz)   03/16/22 0100 116 kg (254 lb 13.6 oz)   03/15/22 0600 115 kg (253 lb 8.5 oz)   03/14/22 0511 120 kg (265 lb 6.9 oz)   03/13/22 0757 115 kg (254 lb)   03/13/22 0321 116 kg (254 lb 13.6 oz)   03/12/22 1910 113 kg (250 lb)   03/09/22 0336 113 kg (249 lb 1.9 oz)   03/08/22 0416 113 kg (249 lb 5.4 oz)   03/07/22 1044 114 kg (251 lb)   02/24/22 0733 95.7 kg (211 lb)   01/20/22 0826 96.1 kg (211 lb 14.9 oz)   09/23/21 1006 106 kg (234 lb)   08/30/21 1647 106 kg (234 lb 9.1 oz)   08/30/21 1147 106 kg (234 lb 5.6 oz)   08/25/21 0532 103 kg (227 lb 1.2 oz)   08/24/21 0641 98.6 kg (217 lb 6 oz)   08/23/21 0649 98.2 kg (216 lb 7.9 oz)   08/21/21 2338 104 kg (229 lb 2.3 oz)   08/21/21 0600 105 kg (231 lb 14.4 oz)   08/20/21 0612 106 kg (234 lb 2.1 oz)   08/18/21 0640 108 kg (238 lb 12.1 oz)   08/16/21 0441 108 kg (238 lb 12.1 oz)   08/14/21 0820 113 kg (248 lb 3.8 oz)   08/12/21 0555 115 kg (254 lb 10.1 oz)   08/11/21 0559 115 kg (253 lb 8.5 oz)   08/10/21 0618 115 kg (253 lb 15.5 oz)   08/09/21 0609 113 kg (249 lb " 1.9 oz)   08/08/21 0626 115 kg (253 lb 1.4 oz)   08/07/21 0612 116 kg (256 lb 9.9 oz)   08/05/21 0551 115 kg (253 lb 4.9 oz)   08/04/21 0115 116 kg (256 lb 2.8 oz)   08/03/21 1733 113 kg (250 lb)   08/01/21 0546 125 kg (276 lb 0.3 oz)   07/31/21 0519 124 kg (274 lb 7.6 oz)   07/29/21 0500 118 kg (261 lb 3.9 oz)   07/28/21 0514 117 kg (258 lb 6.1 oz)   07/27/21 0533 117 kg (257 lb 0.9 oz)   07/26/21 1620 115 kg (253 lb 8.5 oz)   07/24/21 0422 115 kg (252 lb 8 oz)   07/23/21 0342 116 kg (254 lb 13.6 oz)   07/22/21 0328 116 kg (256 lb 9.9 oz)   07/21/21 1405 114 kg (252 lb)   07/21/21 0145 114 kg (252 lb 3.3 oz)   07/20/21 2059 114 kg (251 lb)   07/02/21 0300 114 kg (250 lb 10.6 oz)   07/01/21 0344 113 kg (249 lb 9 oz)   06/30/21 0351 113 kg (248 lb 14.4 oz)   06/29/21 0600 113 kg (248 lb 10.9 oz)   06/28/21 0340 114 kg (251 lb 5.2 oz)   06/27/21 0438 111 kg (245 lb 2.4 oz)   06/26/21 0442 116 kg (256 lb 9.9 oz)   06/26/21 0050 116 kg (255 lb 11.7 oz)   06/25/21 1926 113 kg (250 lb)   06/03/21 0500 119 kg (262 lb 12.6 oz)   06/02/21 0506 118 kg (260 lb 2.3 oz)   06/01/21 0528 121 kg (267 lb 13.7 oz)   05/31/21 0500 122 kg (269 lb 6.4 oz)   05/30/21 0500 121 kg (266 lb 8.6 oz)   05/29/21 0500 122 kg (268 lb 15.4 oz)   05/29/21 0305 122 kg (268 lb 15.4 oz)   05/28/21 0504 122 kg (268 lb 8.3 oz)   05/26/21 0449 118 kg (260 lb 12.9 oz)   05/24/21 0323 119 kg (262 lb 9.1 oz)   05/22/21 2100 125 kg (276 lb 3.2 oz)   05/21/21 0349 125 kg (276 lb 7.3 oz)   05/20/21 0439 127 kg (280 lb 6.8 oz)   05/19/21 0202 127 kg (281 lb 1.4 oz)   05/18/21 2219 122 kg (270 lb)   05/08/21 0511 116 kg (256 lb 13.4 oz)   05/07/21 0606 118 kg (261 lb)   05/07/21 0456 118 kg (261 lb 0.4 oz)   05/06/21 2304 118 kg (261 lb 0.4 oz)   05/06/21 1720 122 kg (270 lb)   03/16/21 1141 125 kg (276 lb)   11/30/20 0557 125 kg (276 lb 7.3 oz)   11/29/20 0615 126 kg (278 lb 7.1 oz)   11/28/20 0635 124 kg (273 lb 5.9 oz)   11/27/20 1611 120 kg (265 lb)    11/27/20 0801 120 kg (265 lb)   11/16/20 0918 120 kg (265 lb)   09/23/20 1047 128 kg (282 lb)   08/24/20 0617 128 kg (282 lb 3 oz)   08/23/20 1212 126 kg (276 lb 14.4 oz)   08/23/20 0932 120 kg (265 lb)   01/23/20 1100 129 kg (284 lb 8 oz)   08/07/19 1413 129 kg (284 lb)   07/26/19 1957 126 kg (278 lb 10.6 oz)   06/26/19 0832 130 kg (285 lb 9.6 oz)   06/22/19 2034 130 kg (287 lb)   06/05/19 1011 129 kg (285 lb)   05/15/19 0850 129 kg (285 lb)   04/24/19 0837 128 kg (283 lb)   04/03/19 0824 127 kg (280 lb)   03/20/19 1106 127 kg (279 lb)   03/06/19 1100 126 kg (278 lb)   02/20/19 0959 126 kg (278 lb)   01/28/19 0820 126 kg (278 lb)      BMI kg/m2 Body mass index is 35.3 kg/m².       Labs        Pertinent Labs    Results from last 7 days   Lab Units 04/06/22  0437 04/05/22  0414 04/04/22  0449   SODIUM mmol/L 136 135* 133*   POTASSIUM mmol/L 4.3 4.2 4.3   CHLORIDE mmol/L 101 102 100   CO2 mmol/L 22.0 22.0 20.0*   BUN mg/dL 27* 22 34*   CREATININE mg/dL 3.46* 2.94* 3.96*   CALCIUM mg/dL 8.2* 8.2* 8.1*   GLUCOSE mg/dL 132* 178* 141*     Results from last 7 days   Lab Units 04/06/22  0437   HEMOGLOBIN g/dL 10.5*   HEMATOCRIT % 32.4*     COVID19   Date Value Ref Range Status   03/29/2022 Not Detected Not Detected - Ref. Range Final     Lab Results   Component Value Date    HGBA1C 6.3 (H) 03/12/2022        Medications    Scheduled Medications apixaban, 5 mg, Oral, Q12H  atorvastatin, 40 mg, Oral, Nightly  Barium Sulfate, 1 teaspoon(s), Oral, Once in imaging  budesonide, 0.5 mg, Nebulization, BID  docusate sodium, 100 mg, Oral, Daily  donepezil, 10 mg, Oral, Nightly  guaiFENesin, 600 mg, Oral, BID  insulin lispro, 0-14 Units, Subcutaneous, 4x Daily With Meals & Nightly  ipratropium-albuterol, 3 mL, Nebulization, TID - RT  levothyroxine, 50 mcg, Oral, Q AM  metoprolol tartrate, 37.5 mg, Oral, BID  midodrine, 10 mg, Oral, TID AC  pantoprazole, 40 mg, Oral, QAM AC  primidone, 25 mg, Oral, Daily  sertraline, 50 mg, Oral,  "Daily        Infusions      PRN Medications calcium carbonate  •  dextrose  •  dextrose  •  glucagon (human recombinant)  •  heparin (porcine)  •  insulin lispro **AND** insulin lispro  •  [COMPLETED] Insert peripheral IV **AND** sodium chloride     Physical Findings        Trending Physical   Appearance, NFPE 4/6: Visually the same as previous findings.  RD did not perform NFPE related to patient getting frustrated at this end of interview.      3/30: NFPE completed and not consistent with nutrition diagnosis of malnutrition at this time using AND/ASPEN criteria      --  Edema  1-2+ trace edema to various areas      Bowel Function Last BM 4/5 (yesterday)     Tubes No feeding tube      Chewing/Swallowing SLP saw 4/5 and recommended mechanical ground with thin liquids after VFSS     Skin Stage 2 to perineum  Left posterior thigh wound  Right popliteal wound   Stage 1 to left heel  Right anterior ankle   Left antecubital      --  Estimated/Assessed Needs    Estimated 3/30/22, continues to be appropriate 4/6/22   Energy Requirements    Height for Calculation  Height: 177.8 cm (70\")   Weight for Calculation 75.4 kg IBW   Method for Estimation  25-30 kcal/kg    EST Needs (kcal/day) 7178-7048 kcal/day        Protein Requirements    Weight for Calculation 75.4 kg IBW   EST Protein Needs (g/kg) 1.0-1.2 g/kg   EST Daily Needs (g/day) 75-91 grams/day       Fluid Requirements     Estimated Needs (mL/day) 8959-4336 mL, will monitor per clinical picture        Fluid Deficit    Current Na Level (mEq/L)    Desired Na Level (mEq/L)    Estimated Fluid Deficit (L)       Current Nutrition Orders & Evaluation of Intake       Oral Nutrition     Food Allergies NKFA   Current PO Diet Diet Texture; Mechanical Ground; No Straws   Supplement N/A   PO Evaluation     Trending % PO Intake 4/6: 64% average po intakes since last review     3/30: NPO since admission    --  Enteral Nutrition    Enteral Route    TF Modular    TF Delivery Method  "   Current TF Order    Current Water Flush     TF Residual/Tolerance     TF Observation         Parenteral Nutrition     TPN Route    Current TPN Order    Lipids (mL/%/frequency)     MVI Frequency     Trace Element Frequency     Total # Days on TPN    TPN Observation         Nutrition Course Details    PO Diets: NPO 3/29-4/1  Mechanical ground NTL 4/1-4/5  Mechanical ground 4/5 to current 4/6   Nutrition Support:      Nutritional Risk Screening        NRS-2002 Score          Nutrition Diagnosis         Nutrition Dx Problem 1 Inadequate oral intake related to intake less than needs as evidenced by po intakes less than 75% during admission.       Nutrition Dx Problem 2        Intervention Goal         Intervention Goal(s) PO intakes greater than 75%     Nutrition Intervention        RD Action Continue to monitor PO intakes      Nutrition Prescription          Diet Prescription Mechanical Ground    Supplement Prescription    --  Enteral Prescription        TPN Prescription      Monitor/Evaluation        Monitor Per protocol, I&O, PO intake, Supplement intake, Pertinent labs, Weight, Skin status, GI status, Symptoms, POC/GOC, Swallow function       Electronically signed by:  Promise Ramirez RD  04/06/22 09:48 EDT

## 2022-04-06 NOTE — PLAN OF CARE
Goal Outcome Evaluation:      Pt currently on 2L NC. Pt had dialysis and got 3L taken off. Refuses to be turned at times. VSS no complaints at this time.    Problem: Adult Inpatient Plan of Care  Goal: Plan of Care Review  Outcome: Ongoing, Progressing

## 2022-04-06 NOTE — THERAPY TREATMENT NOTE
Subjective: Pt agreeable to therapeutic plan of care.    Objective:     Co-treat w/ OT. Required skilled hands of 2 therapists to safely rx pt.     Bed mobility - Max-A and Assist x 2  Transfers - N/A or Not attempted.; sitting balance ex in sitting at eob; able to maintain static sitting balance for ~30 seconds, but when tries to move UEs for functional activity, has loss of balance and needs min to mod assist to maintain balance.  Ambulation - 0 feet N/A or Not attempted.    Pain: 0 VAS  Education: Provided education on importance of mobility and skilled verbal / tactile cueing throughout intervention.     Assessment: Benson Mclean presents with functional mobility impairments which indicate the need for skilled intervention. Tolerating session today without incident. Will continue to follow and progress as tolerated.     Plan/Recommendations:   Pt would benefit from Inpatient Rehabilitation placement at discharge from facility and requires no DME at discharge.   Pt desires Inpatient Rehabilitation placement at discharge. Pt cooperative; agreeable to therapeutic recommendations and plan of care.     Basic Mobility 6-click:  Rollin = Total, A lot = 2, A little = 3; 4 = None  Supine>Sit:   1 = Total, A lot = 2, A little = 3; 4 = None   Sit>Stand with arms:  1 = Total, A lot = 2, A little = 3; 4 = None  Bed>Chair:   1 = Total, A lot = 2, A little = 3; 4 = None  Ambulate in room:  1 = Total, A lot = 2, A little = 3; 4 = None  3-5 Steps with railin = Total, A lot = 2, A little = 3; 4 = None  Score: 6    Modified Fredy: N/A = No pre-op stroke/TIA    Post-Tx Position: Supine with HOB Elevated, Alarms activated and Call light and personal items within reach  PPE: gloves, surgical mask, eyewear protection

## 2022-04-06 NOTE — CASE MANAGEMENT/SOCIAL WORK
Continued Stay Note  DOREEN Amin     Patient Name: Benson Mclean  MRN: [id number]  Today's Date: 4/6/2022    Admit Date: 3/29/2022     Discharge Plan     Row Name 04/06/22 1527       Plan    Plan DC Plan: Accepted to [facility] under Medicaid. From [facility] (spouse did not want return). No precert or PASRR required. OP HD MWF [facility].    Plan Comments Received updated from [place] at [facility] Admissions ([phone] extension [phone]) that patient has been accepted by [facility] with an approved schedule of MWF, chair time of 6:30am. Reported that a schedule letter would be faxed with first tentative treatment being Friday, 4/8. CM updated [facility] liaisonMaricruz of OP HD schedule. DC Barriers: Chest tube remains in place at this time.              Phone communication or documentation only - no physical contact with patient or family.    Kristina Gastelum RN     Office Phone: [phone]  Office Cell: [phone]

## 2022-04-06 NOTE — PROGRESS NOTES
Daily Progress Note        Acute on chronic systolic congestive heart failure (HCC)    S/P CABG (coronary artery bypass graft)    Essential hypertension    Status post coronary artery stent placement    ANNETTE treated with BiPAP    Major depressive disorder, recurrent, mild (HCC)    Mixed hyperlipidemia    History of cerebrovascular accident    History of amputation of lesser toe (HCC)    Flaccid hemiplegia of right dominant side as late effect of cerebral infarction (HCC)    Diabetic neuropathy (HCC)    Unspecified dementia with behavioral disturbance (HCC)    Coronary artery disease    Obesity    Vitamin D deficiency    Chronic venous hypertension (idiopathic) with ulcer and inflammation of bilateral lower extremity (HCC)    Longstanding persistent atrial fibrillation (HCC)    Type 2 diabetes mellitus with hyperglycemia (HCC)    Anemia of renal disease    End stage renal disease (HCC)    Dependence on intermittent renal dialysis (HCC)    Congestive heart failure, unspecified HF chronicity, unspecified heart failure type (HCC)    Obesity (BMI 30-39.9)    Acute CVA (cerebrovascular accident) (HCC)    Hypothyroidism (acquired)    Vitamin B12 deficiency    Dyspnea, unspecified type      Assessment    Chronic large right pleural effusion, pleural fluid is transudate with predominant lymphocytes compatible with chronicity, low pH with normal glucose, cultures are negative    Chronic obstructive pulmonary disease  Obstructive sleep apnea, noncompliant with CPAP  Pulmonary hypertension    Acute on chronic systolic CHF- Elevated proBNP> 70,000.0  Last echo 3/13/2022  -EF 26-30% with severely decreased LV systolic function  -RVSP 50  -Right ventricular cavity is mild to moderately dilated  -Moderate left pleural effusion noted     End-stage renal disease-HD M/W/F    HTN  Type 2 diabetes  CAD-S/P CABG  Hyperlipidemia  Chronic A. fib  Hypothyroidism  Tremors  Depression  Dementia  Vitamin B12 and D  deficiencies  Obesity    History of cerebral hemorrhage-residual flaccid hemiplegia right side with vision loss  Cytokine release syndrome, grade 1     3/29/2022 respiratory panel was negative     Plan     Chest tube management, on waterseal  -4/5/2022 chest tube flushed without resistance  -Continues with moderate amount pleural drainage    Continue to titrate oxygen- Currently on  3L NC and BiPAP as needed    Hemodialysis, MWF  BP control, midodrine 10 mg 3 times a day     Pulmicort nebulizer 2 times a day  DuoNebs 3 times a day  Mucinex  Synthroid 50 mcg  Electrolyte/Glycemic control  Anticoagulation Eliquis  GI prophylaxis Protonix     4/5/2022                                                                 3/29/2022          LOS: 8 days     Subjective         Objective     Vital signs for last 24 hours:  Vitals:    04/05/22 2141 04/06/22 0100 04/06/22 0500 04/06/22 0657   BP:  102/56 109/66    BP Location:  Right arm Right arm    Patient Position:  Lying Lying    Pulse:  108 99 99   Resp:  18 20    Temp: 97.8 °F (36.6 °C) 97.7 °F (36.5 °C) 97.8 °F (36.6 °C)    TempSrc: Axillary Oral Oral    SpO2:  100% 100% 100%   Weight:   112 kg (246 lb 0.5 oz)    Height:           Intake/Output last 3 shifts:  I/O last 3 completed shifts:  In: 585 [P.O.:585]  Out: 185 [Chest Tube:185]  Intake/Output this shift:  No intake/output data recorded.      Radiology  Imaging Results (Last 24 Hours)     Procedure Component Value Units Date/Time    XR Chest 1 View [[id number]] Resulted: 04/06/22 0449     Updated: 04/06/22 0450    FL Video Swallow With Speech Single Contrast [[id number]] Collected: 04/05/22 1322     Updated: 04/05/22 1327    Narrative:      DATE OF EXAM:  4/5/2022 12:30 PM     PROCEDURE:  FL VIDEO SWALLOW W SPEECH SINGLE-CONTRAST-     INDICATIONS:  dysphagia; R06.00-Dyspnea, unspecified; I50.9-Heart failure,  unspecified; J96.01-Acute respiratory failure with hypoxia; J96.02-Acute  respiratory failure with hypercapnia      COMPARISON:  No Comparisons Available     TECHNIQUE:   This examination was performed in conjunction with speech pathology.  Lateral video fluoroscopic evaluation of the swallowing mechanism was  performed while correlate administering to the patient various  consistency food items mixed with barium.     Fluoroscopic Time:   4.7 minutes     Number of Images:  17     FINDINGS:  There was aspiration with thin liquid, which was improved with chin tuck  maneuver. No penetration or aspiration was identified with nectar,  puree, fruit, or fig condon consistencies. Some premature spillage into  the vallecula was identified with fruit.        Impression:      1.Aspiration with thin liquid, which was improved with chin tuck  maneuver.  2.No penetration or aspiration identified with other consistencies.  3.Please refer to speech pathologist report for further details.     Electronically Signed By-Yony Flores MD On:4/5/2022 1:25 PM  This report was finalized on [id number] by  Yony Flores MD.    XR Chest 1 View [[id number]] Collected: 04/05/22 0813     Updated: 04/05/22 0828    Narrative:      DATE OF EXAM:  4/5/2022 5:15 AM     PROCEDURE:  XR CHEST 1 VW-     INDICATIONS:  Chest tube; R06.00-Dyspnea, unspecified; I50.9-Heart failure,  unspecified; J96.01-Acute respiratory failure with hypoxia; J96.02-Acute  respiratory failure with hypercapnia     COMPARISON:  No Comparisons Available     TECHNIQUE:   Single radiographic AP view of the chest was obtained.     FINDINGS:  Right pleural catheter and IJ dialysis catheter remain in place. Heart  size and pulmonary vasculature are stable. No pneumothorax. Stable left  pleural effusion with left lower lobe airspace disease. Stable strandy  atelectasis in the right lung base. No new pulmonary abnormality        Impression:      Stable chest, demonstrating cardiomegaly with stable left pleural  effusion and left lower lobe airspace disease likely atelectasis,  with  atelectasis in the right lung base     Electronically Signed By-Norman Paige On:4/5/2022 8:26 AM  This report was finalized on [id number] by  Norman Paige, .          Labs:  Results from last 7 days   Lab Units 04/06/22  0437   WBC 10*3/mm3 6.90   HEMOGLOBIN g/dL 10.5*   HEMATOCRIT % 32.4*   PLATELETS 10*3/mm3 189     Results from last 7 days   Lab Units 04/06/22  0437   SODIUM mmol/L 136   POTASSIUM mmol/L 4.3   CHLORIDE mmol/L 101   CO2 mmol/L 22.0   BUN mg/dL 27*   CREATININE mg/dL 3.46*   CALCIUM mg/dL 8.2*   GLUCOSE mg/dL 132*                                       Meds:   SCHEDULE  apixaban, 5 mg, Oral, Q12H  atorvastatin, 40 mg, Oral, Nightly  Barium Sulfate, 1 teaspoon(s), Oral, Once in imaging  budesonide, 0.5 mg, Nebulization, BID  docusate sodium, 100 mg, Oral, Daily  donepezil, 10 mg, Oral, Nightly  guaiFENesin, 600 mg, Oral, BID  insulin lispro, 0-14 Units, Subcutaneous, 4x Daily With Meals & Nightly  ipratropium-albuterol, 3 mL, Nebulization, TID - RT  levothyroxine, 50 mcg, Oral, Q AM  metoprolol tartrate, 37.5 mg, Oral, BID  midodrine, 10 mg, Oral, TID AC  pantoprazole, 40 mg, Oral, QAM AC  primidone, 25 mg, Oral, Daily  sertraline, 50 mg, Oral, Daily      Infusions     PRNs  calcium carbonate  •  dextrose  •  dextrose  •  glucagon (human recombinant)  •  heparin (porcine)  •  insulin lispro **AND** insulin lispro  •  [COMPLETED] Insert peripheral IV **AND** sodium chloride    Physical Exam:  Physical Exam  Vitals reviewed.   Pulmonary:      Breath sounds: Decreased breath sounds present.      Comments: Presence of chest tube  Musculoskeletal:         General: Swelling present.      Right lower leg: Edema present.      Left lower leg: Edema present.   Skin:     General: Skin is warm and dry.   Neurological:      Mental Status: He is alert.         ROS  Review of Systems   Respiratory: Positive for shortness of breath.    Cardiovascular: Positive for leg swelling.     I reviewed the  patient's new clinical results    Electronically signed by FABIOLA Bui

## 2022-04-06 NOTE — PLAN OF CARE
Benson Mclean presents with ADL impairments below baseline abilities which indicate the need for continued skilled intervention while inpatient. Pt sits EOB for >10 minutes with Min-Mod A at balance. Able to maintain for 30 seconds, but only instatic position. Completes BUE ROM exercise to tolerance, significantly limited in RUE shoulder. Limited scapular rotation noted to LUE, and little/no rotation on RUE. UB ADLs significantly limited by shoulder pain and limitations, coupled with impaired FMC tasks and poor vision. Tolerating session today without incident. Will continue to follow and progress as tolerated.
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
## 2022-04-06 NOTE — THERAPY TREATMENT NOTE
Subjective: Pt agreeable to therapeutic plan of care.  Cognition: oriented to Person, Place, Time and Situation    Objective:     Bed Mobility: Max-A and Assist x 2  Functional Transfers: N/A or Not attempted.  Functional Ambulation: N/A or Not attempted.    Grooming: Max-A  ADL Position: supported sitting  ADL Comments: Sitting EOB with posterior support. Pt able to comb front / side of hair with LUE, but RUE remains impaired.     Feeding: Mod-A  ADL Position: supine  ADL Comments: able to self-feed with LUE only, and this FMC appears very limited.     OT led BUE PROM HEP. Pt completes 1 set x 8 reps of each exercise with fair understanding. Encouraged to complete daily to facilitate increased activity tolerance. Pt will require additional education to ensure carryover.     Pain: 9 VAS with RUE shoulder movement  Education: Provided education on importance of mobility and skilled verbal / tactile cueing throughout intervention.     Assessment: Benson Mclean presents with ADL impairments below baseline abilities which indicate the need for continued skilled intervention while inpatient. Pt sits EOB for >10 minutes with Min-Mod A at balance. Able to maintain for 30 seconds, but only instatic position. Completes BUE ROM exercise to tolerance, significantly limited in RUE shoulder. Limited scapular rotation noted to LUE, and little/no rotation on RUE. UB ADLs significantly limited by shoulder pain and limitations, coupled with impaired FMC tasks and poor vision. Tolerating session today without incident. Will continue to follow and progress as tolerated.     Plan/Recommendations:   Pt would benefit from Inpatient Rehabilitation placement at discharge from facility.   Pt desires Inpatient Rehabilitation placement at discharge. Pt cooperative; agreeable to therapeutic recommendations and plan of care.     Post-Tx Position: Up in Chair, Alarms activated and Call light and personal items within reach  PPE: gloves,  surgical mask, eyewear protection

## 2022-04-06 NOTE — PROGRESS NOTES
[facility]     Progress Note    Patient Name: Benson Mclean  : 1950  MRN: [id number]  Primary Care Physician:  Rudolph Rooney MD  Date of admission: 3/29/2022    Subjective   Subjective     Chief Complaint: ESRD    History of Present Illness    Patient with ESRD on hd m//    Review of Systems      Objective   Objective     Vitals:   Temp:  [97.7 °F (36.5 °C)-99 °F (37.2 °C)] 97.8 °F (36.6 °C)  Heart Rate:  [] 107  Resp:  [16-20] 20  BP: ()/(44-66) 100/59  Flow (L/min):  [3-4] 3    Physical Exam     General Appearance:  In no acute distress.    HEENT: Normal HEENT exam.     Extremities: .  There is no deformity, effusion or dependent edema.    Neurological: Patient is alert     Result Review    Result Review:  I have personally reviewed the results from the time of this admission to 2022 10:14 EDT and agree with these findings:  []  Laboratory  []  Microbiology  []  Radiology  []  EKG/Telemetry   []  Cardiology/Vascular   []  Pathology  []  Old records  []  Other:      Assessment/Plan   Assessment / Plan     Brief Patient Summary:  Benson Mclean is a 71 y.o. male who has ESRD on hd //    Active Hospital Problems:  Active Hospital Problems    Diagnosis    • **Acute on chronic systolic congestive heart failure (HCC)    • Dyspnea, unspecified type    • Vitamin B12 deficiency    • Hypothyroidism (acquired)    • Acute CVA (cerebrovascular accident) (HCC)      Right occipital with residual vision loss     • Obesity (BMI 30-39.9)    • Congestive heart failure, unspecified HF chronicity, unspecified heart failure type (HCC)    • Dependence on intermittent renal dialysis (HCC)    • End stage renal disease (HCC)    • Anemia of renal disease    • Type 2 diabetes mellitus with hyperglycemia (HCC)    • Mixed hyperlipidemia    • Obesity    • History of cerebrovascular accident    • Major depressive disorder, recurrent, mild (HCC)    • Flaccid hemiplegia of right dominant side as late  effect of cerebral infarction (HCC)    • Longstanding persistent atrial fibrillation (HCC)    • Unspecified dementia with behavioral disturbance (HCC)    • Essential hypertension    • S/P CABG (coronary artery bypass graft)    • Chronic venous hypertension (idiopathic) with ulcer and inflammation of bilateral lower extremity (HCC)    • Coronary artery disease    • Vitamin D deficiency    • History of amputation of lesser toe (HCC)    • ANNETTE treated with BiPAP    • Diabetic neuropathy (HCC)    • Status post coronary artery stent placement      Plan:   Acute on chronic systolic congestive heart failure (HCC)    S/P CABG (coronary artery bypass graft)    Essential hypertension    Status post coronary artery stent placement    ANNETTE treated with BiPAP    Major depressive disorder, recurrent, mild (HCC)    Mixed hyperlipidemia    History of cerebrovascular accident    History of amputation of lesser toe (HCC)    Flaccid hemiplegia of right dominant side as late effect of cerebral infarction (HCC)    Diabetic neuropathy (HCC)    Unspecified dementia with behavioral disturbance (HCC)    Coronary artery disease    Obesity    Vitamin D deficiency    Chronic venous hypertension (idiopathic) with ulcer and inflammation of bilateral lower extremity (HCC)    Longstanding persistent atrial fibrillation (HCC)    Type 2 diabetes mellitus with hyperglycemia (HCC)    Anemia of renal disease    End stage renal disease (HCC)    Dependence on intermittent renal dialysis (HCC)    Congestive heart failure, unspecified HF chronicity, unspecified heart failure type (HCC)    Obesity (BMI 30-39.9)    Acute CVA (cerebrovascular accident) (HCC)    Hypothyroidism (acquired)    Vitamin B12 deficiency    Dyspnea, unspecified type    Patient seen and examined. Awake, alert. No distress. Labs reviewed. Awaiting dialysis today. Will continue m/w/f    Mary Harden MD

## 2022-04-07 NOTE — THERAPY TREATMENT NOTE
Subjective: Pt agreeable to therapeutic plan of care.      Objective:     [place] w/ OT. Required skilled hands of 2 therapists to safely treat patient.     Bed mobility - Max-A and Assist x 2; supine to eob, then later eob back to supine.   Transfers - N/A or Not attempted.  Ambulation - 0 feet N/A or Not attempted.     Therapeutic exercise: hip flexion, laq for BLEs x 10 aarom in sitting at eob; balance ex while sitting eob. Pt needs mod assist for balance while sitting at eob when static, but when tries to move extremities, needs max assist to maintain balance; facilitation of wt shifting to midline to recover balance actively.     Pain: 4 VAS  Education: Provided education on importance of mobility and skilled verbal / tactile cueing throughout intervention.     Assessment: Benson Mclean continues to require high level of assist for functional mobility. He presents with functional mobility impairments which indicate the need for skilled intervention. Tolerating session today without incident. Will continue to follow and progress as tolerated.     Plan/Recommendations:   Pt would benefit from Skilled Rehab placement at discharge from facility and requires no DME at discharge.   Pt desires Skilled Rehab placement at discharge. Pt cooperative; agreeable to therapeutic recommendations and plan of care.     Basic Mobility 6-click:  Rollin = Total, A lot = 2, A little = 3; 4 = None  Supine>Sit:   1 = Total, A lot = 2, A little = 3; 4 = None   Sit>Stand with arms:  1 = Total, A lot = 2, A little = 3; 4 = None  Bed>Chair:   1 = Total, A lot = 2, A little = 3; 4 = None  Ambulate in room:  1 = Total, A lot = 2, A little = 3; 4 = None  3-5 Steps with railin = Total, A lot = 2, A little = 3; 4 = None  Score: 6    Modified [place]: N/A = No pre-op stroke/TIA    Post-Tx Position: Supine with HOB Elevated, Alarms activated and Call light and personal items within reach  PPE: gloves, surgical mask, eyewear  protection

## 2022-04-07 NOTE — PLAN OF CARE
Goal Outcome Evaluation:    Patient has been resting in bed most of shift.  Pt has been titrated down on oxygen, currently at 1L and tolerating well.  Patient has had no complaints of pain and VSS.

## 2022-04-07 NOTE — PROGRESS NOTES
[facility] Medicine Services Daily Progress Note    Patient Name: Benson Mclean  : 1950  MRN: [id number]  Primary Care Physician:  Rudolph Ronoey MD  Date of admission: 3/29/2022      Subjective      Chief Complaint: Shortness of breath    Patient seen and examined this morning.  Doing well, breathing continues to improve. No other complaints.    Pertinent positives as noted in HPI/subjective.  All other systems were reviewed and are negative.      Objective      Vitals:   Temp:  [97.7 °F (36.5 °C)-98 °F (36.7 °C)] 97.7 °F (36.5 °C)  Heart Rate:  [] 103  Resp:  [18-21] 21  BP: ()/(55-66) 96/56  Flow (L/min):  [2-3] 2    Physical Exam:    General: Awake and alert, lying in bed, chronically ill-appearing, NAD  Eyes: PERRL, EOMI, conjunctive are clear  Cardiovascular: Regular rate and rhythm, no murmurs  Respiratory: Decreased breath sounds bilaterally, improving, no wheezing, unlabored breathing  Abdomen: Soft, obese, nontender, positive bowel sounds, no guarding  Neurologic: A&O, CN grossly intact, moves all extremities spontaneously  Musculoskeletal: Generalized weakness with right greater than left noted, no gross deformities  Skin: Warm, dry, intact         Result Review    Result Review:  I have personally reviewed the results from the time of this admission to 2022 23:58 EDT and agree with these findings:  [x]  Laboratory  [x]  Microbiology  [x]  Radiology  [x]  EKG/Telemetry   []  Cardiology/Vascular   []  Pathology  []  Old records  []  Other:    Wounds (last 24 hours)     LDA Wound     Row Name 22 1600 22 1200       Wound 22 0340 perineum Pressure Injury    Wound - Properties Group Placement Date: 22  -AS Placement Time: 340  -AS Present on Hospital Admission: Y  -AS Location: perineum  -AS Primary Wound Type: Pressure inj  -AS    Dressing Appearance open to air  -KR open to air  -MS open to air  -MS    Base red  -KR  red  -MS red  -MS    Drainage Amount -- none  -MS none  -MS    Dressing Care -- open to air  -MS open to air  -MS    Retired Wound - Properties Group Placement Date: 03/30/22  -AS Placement Time: 0340  -AS Present on Hospital Admission: Y  -AS Location: perineum  -AS Primary Wound Type: Pressure inj  -AS    Retired Wound - Properties Group Date first assessed: 03/30/22  -AS Time first assessed: 0340  -AS Present on Hospital Admission: Y  -AS Location: perineum  -AS Primary Wound Type: Pressure inj  -AS       Wound 03/30/22 0341 Left posterior thigh    Wound - Properties Group Placement Date: 03/30/22  -AS Placement Time: 0341  -AS Present on Hospital Admission: Y  -AS Side: Left  -AS Orientation: posterior  -AS Location: thigh  -AS    Dressing Appearance open to air  -KR open to air  -MS open to air  -MS    Base scab  -KR scab  -MS scab  -MS    Drainage Amount -- none  -MS none  -MS    Dressing Care -- open to air  -MS open to air  -MS    Periwound Care -- -- dry periwound area maintained  -MS    Retired Wound - Properties Group Placement Date: 03/30/22  -AS Placement Time: 0341  -AS Present on Hospital Admission: Y  -AS Side: Left  -AS Orientation: posterior  -AS Location: thigh  -AS    Retired Wound - Properties Group Date first assessed: 03/30/22  -AS Time first assessed: 0341  -AS Present on Hospital Admission: Y  -AS Side: Left  -AS Location: thigh  -AS       Wound 03/30/22 0342 Right popliteal    Wound - Properties Group Placement Date: 03/30/22  -AS Placement Time: 0342  -AS Present on Hospital Admission: Y  -AS Side: Right  -AS Location: popliteal  -AS    Dressing Appearance open to air  -KR open to air  -MS open to air  -MS    Drainage Amount -- none  -MS none  -MS    Dressing Care -- open to air  -MS open to air  -MS    Periwound Care -- -- dry periwound area maintained  -MS    Retired Wound - Properties Group Placement Date: 03/30/22  -AS Placement Time: 0342  -AS Present on Hospital Admission: Y  -AS  Side: Right  -AS Location: popliteal  -AS    Retired Wound - Properties Group Date first assessed: 03/30/22  -AS Time first assessed: 0342  -AS Present on Hospital Admission: Y  -AS Side: Right  -AS Location: popliteal  -AS       Wound 03/30/22 0343 Left heel    Wound - Properties Group Placement Date: 03/30/22  -AS Placement Time: 0343  -AS Present on Hospital Admission: Y  -AS Side: Left  -AS Location: heel  -AS    Dressing Appearance open to air  -KR open to air  -MS open to air  -MS    Base -- blanchable;red  -MS blanchable;red  -MS    Drainage Amount -- none  -MS none  -MS    Dressing Care -- open to air  -MS open to air  -MS    Periwound Care -- -- dry periwound area maintained  -MS    Retired Wound - Properties Group Placement Date: 03/30/22  -AS Placement Time: 0343  -AS Present on Hospital Admission: Y  -AS Side: Left  -AS Location: heel  -AS    Retired Wound - Properties Group Date first assessed: 03/30/22  -AS Time first assessed: 0343  -AS Present on Hospital Admission: Y  -AS Side: Left  -AS Location: heel  -AS       Wound 03/30/22 0348 Right anterior ankle    Wound - Properties Group Placement Date: 03/30/22  -AS Placement Time: 0348  -AS Present on Hospital Admission: Y  -AS Side: Right  -AS Orientation: anterior  -AS Location: ankle  -AS    Dressing Appearance open to air  -KR open to air  -MS open to air  -MS    Base -- scab  -MS scab  -MS    Drainage Amount -- none  -MS none  -MS    Dressing Care -- open to air  -MS open to air  -MS    Periwound Care -- -- dry periwound area maintained  -MS    Retired Wound - Properties Group Placement Date: 03/30/22  -AS Placement Time: 0348  -AS Present on Hospital Admission: Y  -AS Side: Right  -AS Orientation: anterior  -AS Location: ankle  -AS    Retired Wound - Properties Group Date first assessed: 03/30/22  -AS Time first assessed: 0348  -AS Present on Hospital Admission: Y  -AS Side: Right  -AS Location: ankle  -AS       Wound 03/30/22 0349 Left antecubital     Wound - Properties Group Placement Date: 03/30/22  -AS Placement Time: 0349  -AS Present on Hospital Admission: Y  -AS Side: Left  -AS Location: antecubital  -AS    Dressing Appearance open to air  -KR open to air  -MS open to air  -MS    Drainage Amount -- none  -MS none  -MS    Dressing Care -- open to air  -MS open to air  -MS    Periwound Care -- -- dry periwound area maintained  -MS    Retired Wound - Properties Group Placement Date: 03/30/22  -AS Placement Time: 0349  -AS Present on Hospital Admission: Y  -AS Side: Left  -AS Location: antecubital  -AS    Retired Wound - Properties Group Date first assessed: 03/30/22  -AS Time first assessed: 0349  -AS Present on Hospital Admission: Y  -AS Side: Left  -AS Location: antecubital  -AS    Row Name 04/06/22 0800 04/06/22 0400 04/06/22 0000       Wound 03/30/22 0340 perineum Pressure Injury    Wound - Properties Group Placement Date: 03/30/22  -AS Placement Time: 0340  -AS Present on Hospital Admission: Y  -AS Location: perineum  -AS Primary Wound Type: Pressure inj  -AS    Dressing Appearance open to air  -MS -- --    Base red  -MS red  -AS red  -AS    Drainage Amount none  -MS none  -AS none  -AS    Dressing Care skin barrier agent applied;open to air  -MS -- --    Retired Wound - Properties Group Placement Date: 03/30/22  -AS Placement Time: 0340  -AS Present on Hospital Admission: Y  -AS Location: perineum  -AS Primary Wound Type: Pressure inj  -AS    Retired Wound - Properties Group Date first assessed: 03/30/22  -AS Time first assessed: 0340  -AS Present on Hospital Admission: Y  -AS Location: perineum  -AS Primary Wound Type: Pressure inj  -AS       Wound 03/30/22 0341 Left posterior thigh    Wound - Properties Group Placement Date: 03/30/22  -AS Placement Time: 0341  -AS Present on Hospital Admission: Y  -AS Side: Left  -AS Orientation: posterior  -AS Location: thigh  -AS    Dressing Appearance open to air  -MS -- --    Closure -- None  -AS None  -AS     Base scab  -MS scab  -AS scab  -AS    Drainage Amount none  -MS none  -AS none  -AS    Dressing Care open to air  -MS -- --    Periwound Care dry periwound area maintained  -MS -- --    Retired Wound - Properties Group Placement Date: 03/30/22  -AS Placement Time: 0341  -AS Present on Hospital Admission: Y  -AS Side: Left  -AS Orientation: posterior  -AS Location: thigh  -AS    Retired Wound - Properties Group Date first assessed: 03/30/22  -AS Time first assessed: 0341  -AS Present on Hospital Admission: Y  -AS Side: Left  -AS Location: thigh  -AS       Wound 03/30/22 0342 Right popliteal    Wound - Properties Group Placement Date: 03/30/22  -AS Placement Time: 0342  -AS Present on Hospital Admission: Y  -AS Side: Right  -AS Location: popliteal  -AS    Dressing Appearance open to air  -MS -- --    Closure -- None  -AS None  -AS    Drainage Amount none  -MS none  -AS none  -AS    Dressing Care open to air  -MS -- --    Periwound Care dry periwound area maintained  -MS -- --    Retired Wound - Properties Group Placement Date: 03/30/22  -AS Placement Time: 0342  -AS Present on Hospital Admission: Y  -AS Side: Right  -AS Location: popliteal  -AS    Retired Wound - Properties Group Date first assessed: 03/30/22  -AS Time first assessed: 0342  -AS Present on Hospital Admission: Y  -AS Side: Right  -AS Location: popliteal  -AS       Wound 03/30/22 0343 Left heel    Wound - Properties Group Placement Date: 03/30/22  -AS Placement Time: 0343  -AS Present on Hospital Admission: Y  -AS Side: Left  -AS Location: heel  -AS    Dressing Appearance open to air  -MS -- --    Base blanchable;red  -MS blanchable;red  -AS blanchable;red  -AS    Drainage Amount none  -MS none  -AS none  -AS    Dressing Care open to air  -MS -- --    Periwound Care dry periwound area maintained  -MS -- --    Retired Wound - Properties Group Placement Date: 03/30/22  -AS Placement Time: 0343  -AS Present on Hospital Admission: Y  -AS Side: Left  -AS  Location: heel  -AS    Retired Wound - Properties Group Date first assessed: 03/30/22  -AS Time first assessed: 0343  -AS Present on Hospital Admission: Y  -AS Side: Left  -AS Location: heel  -AS       Wound 03/30/22 0348 Right anterior ankle    Wound - Properties Group Placement Date: 03/30/22  -AS Placement Time: 0348  -AS Present on Hospital Admission: Y  -AS Side: Right  -AS Orientation: anterior  -AS Location: ankle  -AS    Dressing Appearance open to air  -MS -- --    Closure -- None  -AS None  -AS    Base scab  -MS scab  -AS scab  -AS    Drainage Amount none  -MS none  -AS none  -AS    Dressing Care open to air  -MS -- --    Periwound Care dry periwound area maintained  -MS -- --    Retired Wound - Properties Group Placement Date: 03/30/22  -AS Placement Time: 0348 -AS Present on Hospital Admission: Y  -AS Side: Right  -AS Orientation: anterior  -AS Location: ankle  -AS    Retired Wound - Properties Group Date first assessed: 03/30/22  -AS Time first assessed: 0348  -AS Present on Hospital Admission: Y  -AS Side: Right  -AS Location: ankle  -AS       Wound 03/30/22 0349 Left antecubital    Wound - Properties Group Placement Date: 03/30/22  -AS Placement Time: 0349  -AS Present on Hospital Admission: Y  -AS Side: Left  -AS Location: antecubital  -AS    Dressing Appearance open to air  -MS -- --    Drainage Amount none  -MS none  -AS none  -AS    Dressing Care open to air  -MS -- --    Periwound Care dry periwound area maintained  -MS -- --    Retired Wound - Properties Group Placement Date: 03/30/22  -AS Placement Time: 0349  -AS Present on Hospital Admission: Y  -AS Side: Left  -AS Location: antecubital  -AS    Retired Wound - Properties Group Date first assessed: 03/30/22  -AS Time first assessed: 0349  -AS Present on Hospital Admission: Y  -AS Side: Left  -AS Location: antecubital  -AS          User Key  (r) = Recorded By, (t) = Taken By, (c) = Cosigned By    Initials Name Provider Type    KELSEY Adams  Judy, RN Registered Nurse    AS Maricruz Mi, RN Registered Nurse    Misti Anders, RN Registered Nurse                  Assessment/Plan      Brief Patient Summary:  Benson Mclean is a 71 y.o. 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    Patient is poor historian but states he has been feeling shortness of breath for past few days.  Unknown of last dialysis.  Was recently here for CHF exacerbation and was diagnosed with ischemic CVA at the time with mild hemorrhagic conversion.  Was discharged to rehab and plan for restarting Eliquis if repeat CT head is stable per neurology recommendations.  CT of the head on admission is stable.  On admission patient severely hypoxic and placed on high flow oxygen.  Imaging report noted to have large right-sided pleural effusion, pulmonology and nephrology consulted.  Chest tube placed by pulmonology on 3/30.  Transudate fluid with Cultures negative so far.      apixaban, 5 mg, Oral, Q12H  atorvastatin, 40 mg, Oral, Nightly  Barium Sulfate, 1 teaspoon(s), Oral, Once in imaging  budesonide, 0.5 mg, Nebulization, BID  docusate sodium, 100 mg, Oral, Daily  donepezil, 10 mg, Oral, Nightly  guaiFENesin, 600 mg, Oral, BID  insulin lispro, 0-14 Units, Subcutaneous, 4x Daily With Meals & Nightly  ipratropium-albuterol, 3 mL, Nebulization, TID - RT  levothyroxine, 50 mcg, Oral, Q AM  metoprolol tartrate, 37.5 mg, Oral, BID  midodrine, 10 mg, Oral, TID AC  pantoprazole, 40 mg, Oral, QAM AC  primidone, 25 mg, Oral, Daily  sertraline, 50 mg, Oral,  Daily             Active Hospital Problems:  Active Hospital Problems    Diagnosis    • **Acute on chronic systolic congestive heart failure (HCC)    • Dyspnea, unspecified type    • Vitamin B12 deficiency    • Hypothyroidism (acquired)    • Acute CVA (cerebrovascular accident) ([facility])      Right occipital with residual vision loss     • Obesity (BMI 30-39.9)    • Congestive heart failure, unspecified HF chronicity, unspecified heart failure type ([facility])    • Dependence on intermittent renal dialysis ([facility])    • End stage renal disease ([facility])    • Anemia of renal disease    • Type 2 diabetes mellitus with hyperglycemia ([facility])    • Mixed hyperlipidemia    • Obesity    • History of cerebrovascular accident    • Major depressive disorder, recurrent, mild ([facility])    • Flaccid hemiplegia of right dominant side as late effect of cerebral infarction ([facility])    • Longstanding persistent atrial fibrillation ([facility])    • Unspecified dementia with behavioral disturbance ([facility])    • Essential hypertension    • S/P CABG (coronary artery bypass graft)    • Chronic venous hypertension (idiopathic) with ulcer and inflammation of bilateral lower extremity ([facility])    • Coronary artery disease    • Vitamin D deficiency    • History of amputation of lesser toe ([facility])    • ANNETTE treated with BiPAP    • Diabetic neuropathy ([facility])    • Status post coronary artery stent placement      Plan:     Acute on chronic hypoxic and hypercapnic respiratory failure, improving  Large right-sided pleural effusion  Acute on chronic HFrEF  -Multifactorial with underlying ANNETTE, and CHF with morbid obesity  -Imaging reports noted  -s/p right chest tube placement by pulmonology on 3/30, transudate fluid noted  -ultrafiltration per nephrology  -Patient does not make urine, Lasix discontinued  -Continue other home meds  -Weaned down to nasal cannula  -Pulmonology following, palliative care following, remains full code     ESRD on HD  -Ultrafiltration as tolerated  -Midodrine for  blood pressure support  -Continue dialysis support per nephrology    Recent CVA  -Diagnosed during last admission, previous imaging reports and discharge summary noted  -Has some residual right-sided deficits  -Repeat CT head this admission is stable, no hemorrhage noted  -Per previous notes, plan was to start Eliquis for his A. fib if repeat CT is stable per neurology recommendations  -Started on Eliquis this admission    PAF  -Rate controlled currently, continue home meds  -Eliquis started  -Monitor on telemetry     DM2  Dysphagia  -Patient much more awake and alert, s/p VFSS, on diet per ST  -Basal insulin stopped due to hypoglycemia, resume when able  -Continue sliding scale, monitor blood glucose  -Adjust as needed    Hypothyroidism  -Continue home meds     Major depressive disorder, recurrent, mild   -Continue home meds, monitor     Obesity (BMI 30-39.9)  BMI 36.92  -Lifestyle modifications to reduce cardiovascular risk factors    DVT ppx  -Eliquis       CODE STATUS:    Code Status (Patient has no pulse and is not breathing): CPR (Attempt to Resuscitate)  Medical Interventions (Patient has pulse or is breathing): Full Support      Disposition: Rehab placement, needs pre-CERT    Electronically signed by Mari Bernal MD, 04/06/22, 23:58 EDT.  [facility] Hospitalist Team

## 2022-04-07 NOTE — PLAN OF CARE
Goal Outcome Evaluation:      Pt on room air. Chest tube level currently at 1350cc. Wife to join pt for dinner in his room now that wife is out of precautions. VSS no complaints at this time.    Problem: Adult Inpatient Plan of Care  Goal: Plan of Care Review  Outcome: Ongoing, Progressing

## 2022-04-07 NOTE — PROGRESS NOTES
[facility]     Progress Note    Patient Name: Benson Mclean  : 1950  MRN: [id number]  Primary Care Physician:  Rudolph Rooney MD  Date of admission: 3/29/2022    Subjective   Subjective     Chief Complaint: ESRD    History of Present Illness    Patient with ESRD on hd m//    Review of Systems      Objective   Objective     Vitals:   Temp:  [97.6 °F (36.4 °C)-98 °F (36.7 °C)] 97.6 °F (36.4 °C)  Heart Rate:  [] 106  Resp:  [20-23] 23  BP: ()/(55-86) 118/68  Flow (L/min):  [1-3] 1    Physical Exam     General Appearance:  In no acute distress.    HEENT: Normal HEENT exam.     Extremities: .  There is no deformity, effusion or dependent edema.    Neurological: Patient is alert     Result Review    Result Review:  I have personally reviewed the results from the time of this admission to 2022 07:38 EDT and agree with these findings:  []  Laboratory  []  Microbiology  []  Radiology  []  EKG/Telemetry   []  Cardiology/Vascular   []  Pathology  []  Old records  []  Other:      Assessment/Plan   Assessment / Plan     Brief Patient Summary:  Benson Mclean is a 71 y.o. male who has ESRD on hd //    Active Hospital Problems:  Active Hospital Problems    Diagnosis    • **Acute on chronic systolic congestive heart failure (HCC)    • Dyspnea, unspecified type    • Vitamin B12 deficiency    • Hypothyroidism (acquired)    • Acute CVA (cerebrovascular accident) (HCC)      Right occipital with residual vision loss     • Obesity (BMI 30-39.9)    • Congestive heart failure, unspecified HF chronicity, unspecified heart failure type (HCC)    • Dependence on intermittent renal dialysis (HCC)    • End stage renal disease (HCC)    • Anemia of renal disease    • Type 2 diabetes mellitus with hyperglycemia (HCC)    • Mixed hyperlipidemia    • Obesity    • History of cerebrovascular accident    • Major depressive disorder, recurrent, mild (HCC)    • Flaccid hemiplegia of right dominant side as late  effect of cerebral infarction (HCC)    • Longstanding persistent atrial fibrillation (HCC)    • Unspecified dementia with behavioral disturbance (HCC)    • Essential hypertension    • S/P CABG (coronary artery bypass graft)    • Chronic venous hypertension (idiopathic) with ulcer and inflammation of bilateral lower extremity (HCC)    • Coronary artery disease    • Vitamin D deficiency    • History of amputation of lesser toe (HCC)    • ANNETTE treated with BiPAP    • Diabetic neuropathy (HCC)    • Status post coronary artery stent placement      Plan:   Acute on chronic systolic congestive heart failure (HCC)    S/P CABG (coronary artery bypass graft)    Essential hypertension    Status post coronary artery stent placement    ANNETTE treated with BiPAP    Major depressive disorder, recurrent, mild (HCC)    Mixed hyperlipidemia    History of cerebrovascular accident    History of amputation of lesser toe (HCC)    Flaccid hemiplegia of right dominant side as late effect of cerebral infarction (HCC)    Diabetic neuropathy (HCC)    Unspecified dementia with behavioral disturbance (HCC)    Coronary artery disease    Obesity    Vitamin D deficiency    Chronic venous hypertension (idiopathic) with ulcer and inflammation of bilateral lower extremity (HCC)    Longstanding persistent atrial fibrillation (HCC)    Type 2 diabetes mellitus with hyperglycemia (HCC)    Anemia of renal disease    End stage renal disease (HCC)    Dependence on intermittent renal dialysis (HCC)    Congestive heart failure, unspecified HF chronicity, unspecified heart failure type (HCC)    Obesity (BMI 30-39.9)    Acute CVA (cerebrovascular accident) (HCC)    Hypothyroidism (acquired)    Vitamin B12 deficiency    Dyspnea, unspecified type    Patient seen and examined. Awake, alert. No distress. Labs reviewed. Will continue hd m/w/f      Mary Harden MD

## 2022-04-07 NOTE — PROGRESS NOTES
Daily Progress Note        Acute on chronic systolic congestive heart failure (HCC)    S/P CABG (coronary artery bypass graft)    Essential hypertension    Status post coronary artery stent placement    ANNETTE treated with BiPAP    Major depressive disorder, recurrent, mild (HCC)    Mixed hyperlipidemia    History of cerebrovascular accident    History of amputation of lesser toe (HCC)    Flaccid hemiplegia of right dominant side as late effect of cerebral infarction (HCC)    Diabetic neuropathy (HCC)    Unspecified dementia with behavioral disturbance (HCC)    Coronary artery disease    Obesity    Vitamin D deficiency    Chronic venous hypertension (idiopathic) with ulcer and inflammation of bilateral lower extremity (HCC)    Longstanding persistent atrial fibrillation (HCC)    Type 2 diabetes mellitus with hyperglycemia (HCC)    Anemia of renal disease    End stage renal disease (HCC)    Dependence on intermittent renal dialysis (HCC)    Congestive heart failure, unspecified HF chronicity, unspecified heart failure type (HCC)    Obesity (BMI 30-39.9)    Acute CVA (cerebrovascular accident) (HCC)    Hypothyroidism (acquired)    Vitamin B12 deficiency    Dyspnea, unspecified type      Assessment    Chronic large right pleural effusion, pleural fluid is transudate with predominant lymphocytes compatible with chronicity, low pH with normal glucose, cultures are negative    Chronic obstructive pulmonary disease  Obstructive sleep apnea, noncompliant with CPAP  Pulmonary hypertension    Acute on chronic systolic CHF- Elevated proBNP> 70,000.0  Last echo 3/13/2022  -EF 26-30% with severely decreased LV systolic function  -RVSP 50  -Right ventricular cavity is mild to moderately dilated  -Moderate left pleural effusion noted     End-stage renal disease-HD M/W/F    HTN  Type 2 diabetes  CAD-S/P CABG  Hyperlipidemia  Chronic A. fib  Hypothyroidism  Tremors  Depression  Dementia  Vitamin B12 and D  deficiencies  Obesity    History of cerebral hemorrhage-residual flaccid hemiplegia right side with vision loss  Cytokine release syndrome, grade 1     3/29/2022 respiratory panel was negative     Plan     Chest tube management, on waterseal  -4/5/2022 chest tube flushed without resistance  -Continues with moderate amount pleural drainage    Continue to titrate oxygen-  BiPAP as needed    Hemodialysis, MWF  BP control, midodrine 10 mg 3 times a day     Pulmicort nebulizer 2 times a day  DuoNebs 3 times a day  Mucinex  Synthroid 50 mcg  Electrolyte/Glycemic control  Anticoagulation Eliquis  GI prophylaxis Protonix     4/5/2022                                                                 3/29/2022          LOS: 9 days     Subjective         Objective     Vital signs for last 24 hours:  Vitals:    04/07/22 0213 04/07/22 0432 04/07/22 0532 04/07/22 0536   BP: 108/56 130/86  118/68   BP Location: Right arm Right arm  Right arm   Patient Position: Lying Lying  Lying   Pulse: 109 102  106   Resp: 22 23     Temp:  97.6 °F (36.4 °C)     TempSrc:  Oral     SpO2: 97% 96% 97% 95%   Weight:    112 kg (247 lb 12.8 oz)   Height:           Intake/Output last 3 shifts:  I/O last 3 completed shifts:  In: 300 [P.O.:300]  Out: 3000 [Other:3000]  Intake/Output this shift:  No intake/output data recorded.      Radiology  Imaging Results (Last 24 Hours)     Procedure Component Value Units Date/Time    XR Chest 1 View [[id number]] Resulted: 04/07/22 0419     Updated: 04/07/22 0428    XR Chest 1 View [[id number]] Collected: 04/06/22 0750     Updated: 04/06/22 0754    Narrative:      DATE OF EXAM:  4/6/2022 4:24 AM     PROCEDURE:  XR CHEST 1 VW-     INDICATIONS:  Chest tube; R06.00-Dyspnea, unspecified; I50.9-Heart failure,  unspecified; J96.01-Acute respiratory failure with hypoxia; J96.02-Acute  respiratory failure with hypercapnia     COMPARISON:  04/05/2022     TECHNIQUE:   Single radiographic AP view of the chest was obtained.      FINDINGS:  Right IJ dialysis catheter and right pleural catheter remain in place.  No new tubes or lines. Heart size stable. Persistent opacification of  the lower left chest compatible with combination of left pleural  effusion and left lower lobe airspace disease. Effusion may be slightly  smaller. There is minimal atelectasis in the right lung base. There is  no pneumothorax        Impression:      Suspect mild improvement in left pleural effusion and basilar airspace  disease, likely atelectasis. No new suspicious abnormality     Electronically Signed By-Norman Paige On:4/6/2022 7:52 AM  This report was finalized on [id number] by  Norman Paige, .          Labs:  Results from last 7 days   Lab Units 04/07/22  0628   WBC 10*3/mm3 5.80   HEMOGLOBIN g/dL 11.7*   HEMATOCRIT % 35.7*   PLATELETS 10*3/mm3 184     Results from last 7 days   Lab Units 04/06/22  0437   SODIUM mmol/L 136   POTASSIUM mmol/L 4.3   CHLORIDE mmol/L 101   CO2 mmol/L 22.0   BUN mg/dL 27*   CREATININE mg/dL 3.46*   CALCIUM mg/dL 8.2*   GLUCOSE mg/dL 132*                                       Meds:   SCHEDULE  apixaban, 5 mg, Oral, Q12H  atorvastatin, 40 mg, Oral, Nightly  Barium Sulfate, 1 teaspoon(s), Oral, Once in imaging  budesonide, 0.5 mg, Nebulization, BID  docusate sodium, 100 mg, Oral, Daily  donepezil, 10 mg, Oral, Nightly  guaiFENesin, 600 mg, Oral, BID  insulin lispro, 0-14 Units, Subcutaneous, 4x Daily With Meals & Nightly  ipratropium-albuterol, 3 mL, Nebulization, TID - RT  levothyroxine, 50 mcg, Oral, Q AM  metoprolol tartrate, 37.5 mg, Oral, BID  midodrine, 10 mg, Oral, TID AC  pantoprazole, 40 mg, Oral, QAM AC  primidone, 25 mg, Oral, Daily  sertraline, 50 mg, Oral, Daily      Infusions     PRNs  •  albumin human  •  calcium carbonate  •  dextrose  •  dextrose  •  glucagon (human recombinant)  •  heparin (porcine)  •  insulin lispro **AND** insulin lispro  •  [COMPLETED] Insert peripheral IV **AND** sodium  chloride    Physical Exam:  Physical Exam  Vitals reviewed.   Pulmonary:      Breath sounds: Decreased breath sounds present.      Comments: Presence of chest tube  Musculoskeletal:         General: Swelling present.      Right lower leg: Edema present.      Left lower leg: Edema present.   Skin:     General: Skin is warm and dry.   Neurological:      Mental Status: He is alert.         ROS  Review of Systems   Respiratory: Positive for shortness of breath.    Cardiovascular: Positive for leg swelling.     I reviewed the patient's new clinical results    Electronically signed by FABIOLA Bui

## 2022-04-07 NOTE — PLAN OF CARE
Assessment: Benson Mclean presents with ADL impairments below baseline abilities which indicate the need for continued skilled intervention while inpatient. Patient continues to require 2 person assist for treatment sessions. MAX Ax2 for supine to sit, dependent x2 for scooting to head of bed. Max assist required for EOB ADLs, with varying levels of trunk support required to remain upright (CGA-MAX A). Tolerating session today without incident. Will continue to follow and progress as tolerated.     Plan/Recommendations:   Pt would benefit from Skilled Rehab placement at discharge from facility.   Pt desires Skilled Rehab placement at discharge. Pt cooperative; agreeable to therapeutic recommendations and plan of care.

## 2022-04-07 NOTE — PLAN OF CARE
Goal Outcome Evaluation:         Assessment: Benson Mclean continues to require high level of assist for functional mobility. He presents with functional mobility impairments which indicate the need for skilled intervention. Tolerating session today without incident. Will continue to follow and progress as tolerated.     Plan/Recommendations:   Pt would benefit from Skilled Rehab placement at discharge from facility and requires no DME at discharge.   Pt desires Skilled Rehab placement at discharge. Pt cooperative; agreeable to therapeutic recommendations and plan of care.

## 2022-04-07 NOTE — CASE MANAGEMENT/SOCIAL WORK
Continued Stay Note   [place]     Patient Name: Benson Mclean  MRN: [id number]  Today's Date: 4/7/2022    Admit Date: 3/29/2022     Discharge Plan     Row Name 04/07/22 1241       Plan    Plan D/C Plan: [place] accepted under Medicaid. From [facility] (spouse did not want return). No precert or PASRR required. OP HD MWF [facility].    Plan Comments Barrier to D/C: chest tube, HD yesterday.                    Expected Discharge Date and Time     Expected Discharge Date Expected Discharge Time    Apr 8, 2022         Phone communication or documentation only - no physical contact with patient or family.    Christine Dickerson

## 2022-04-07 NOTE — THERAPY TREATMENT NOTE
Subjective: Pt agreeable to therapeutic plan of care.  Cognition: oriented to Person, Place and Situation    Objective:     Bed Mobility: MAX Ax2: Patient transitioned from supine to sitting EOB with 2 person assist. He was able to move legs toward the side of the bed to assist, however overall remains MAX A for this portion of bed mobility. Once in sitting, able to sit unsupported with varying levels of assist, from CGA to MAX A while completing exercises with PT.  Functional Transfers: N/A or Not attempted.  Functional Ambulation: N/A or Not attempted.    Bathing, grooming: Mod-A, Max-A and Assist x 2  ADL Position: edge of bed sitting and supported sitting  ADL Comments: Patient sat EOB to complete grooming and bathing tasks while PT supported trunk for upright balance. He was given a warm shampoo cap. He was max assist to put cap on his head, and he performed hair washing minimally via the use of his left upper extremity. Once completed, he required MAX A - dependent assist for combing his hair.         Pain: 4 VAS  Education: Provided education on importance of mobility and skilled verbal / tactile cueing throughout intervention.     Assessment: Benson Mclean presents with ADL impairments below baseline abilities which indicate the need for continued skilled intervention while inpatient. Patient continues to require 2 person assist for treatment sessions. MAX Ax2 for supine to sit, dependent x2 for scooting to head of bed. Max assist required for EOB ADLs, with varying levels of trunk support required to remain upright (CGA-MAX A). Tolerating session today without incident. Will continue to follow and progress as tolerated.     Plan/Recommendations:   Pt would benefit from Skilled Rehab placement at discharge from facility.   Pt desires Skilled Rehab placement at discharge. Pt cooperative; agreeable to therapeutic recommendations and plan of care.     Modified [place]: 5 = Severe disability (Requires  constant nursing care and attention, bedridden, incontinent)    Post-Tx Position: Supine with HOB Elevated, Alarms activated and Call light and personal items within reach  PPE: gloves, surgical mask, eyewear protection

## 2022-04-08 NOTE — SIGNIFICANT NOTE
04/08/22 1152   OTHER   Discipline occupational therapist   Rehab Time/Intention   Session Not Performed patient unavailable for treatment  (Patient off floor for dialysis 10:19AM)   Recommendation   OT - Next Appointment 04/10/22

## 2022-04-08 NOTE — PROGRESS NOTES
[facility]     Progress Note    Patient Name: Benson Mclean  : 1950  MRN: [id number]  Primary Care Physician:  Rudolph Rooney MD  Date of admission: 3/29/2022    Subjective   Subjective     Chief Complaint: ESRD    History of Present Illness    Patient with ESRD on hd m//    Review of Systems      Objective   Objective     Vitals:   Temp:  [97.7 °F (36.5 °C)-98.3 °F (36.8 °C)] 97.7 °F (36.5 °C)  Heart Rate:  [102-114] 110  Resp:  [16-20] 17  BP: (102-132)/(50-85) 112/72  Flow (L/min):  [1] 1    Physical Exam     General Appearance:  In no acute distress.    HEENT: Normal HEENT exam.     Extremities: .  There is no deformity, effusion or dependent edema.    Neurological: Patient is alert     Result Review    Result Review:  I have personally reviewed the results from the time of this admission to 2022 07:42 EDT and agree with these findings:  []  Laboratory  []  Microbiology  []  Radiology  []  EKG/Telemetry   []  Cardiology/Vascular   []  Pathology  []  Old records  []  Other:      Assessment/Plan   Assessment / Plan     Brief Patient Summary:  Benson Mclean is a 71 y.o. male who has ESRD on hd //    Active Hospital Problems:  Active Hospital Problems    Diagnosis    • **Acute on chronic systolic congestive heart failure (HCC)    • Dyspnea, unspecified type    • Vitamin B12 deficiency    • Hypothyroidism (acquired)    • Acute CVA (cerebrovascular accident) (HCC)      Right occipital with residual vision loss     • Obesity (BMI 30-39.9)    • Congestive heart failure, unspecified HF chronicity, unspecified heart failure type (HCC)    • Dependence on intermittent renal dialysis (HCC)    • End stage renal disease (HCC)    • Anemia of renal disease    • Type 2 diabetes mellitus with hyperglycemia (HCC)    • Mixed hyperlipidemia    • Obesity    • History of cerebrovascular accident    • Major depressive disorder, recurrent, mild (HCC)    • Flaccid hemiplegia of right dominant side as late  effect of cerebral infarction (HCC)    • Longstanding persistent atrial fibrillation (HCC)    • Unspecified dementia with behavioral disturbance (HCC)    • Essential hypertension    • S/P CABG (coronary artery bypass graft)    • Chronic venous hypertension (idiopathic) with ulcer and inflammation of bilateral lower extremity (HCC)    • Coronary artery disease    • Vitamin D deficiency    • History of amputation of lesser toe (HCC)    • ANNETTE treated with BiPAP    • Diabetic neuropathy (HCC)    • Status post coronary artery stent placement      Plan:   Acute on chronic systolic congestive heart failure (HCC)    S/P CABG (coronary artery bypass graft)    Essential hypertension    Status post coronary artery stent placement    ANNETTE treated with BiPAP    Major depressive disorder, recurrent, mild (HCC)    Mixed hyperlipidemia    History of cerebrovascular accident    History of amputation of lesser toe (HCC)    Flaccid hemiplegia of right dominant side as late effect of cerebral infarction (HCC)    Diabetic neuropathy (HCC)    Unspecified dementia with behavioral disturbance (HCC)    Coronary artery disease    Obesity    Vitamin D deficiency    Chronic venous hypertension (idiopathic) with ulcer and inflammation of bilateral lower extremity (HCC)    Longstanding persistent atrial fibrillation (HCC)    Type 2 diabetes mellitus with hyperglycemia (HCC)    Anemia of renal disease    End stage renal disease (HCC)    Dependence on intermittent renal dialysis (HCC)    Congestive heart failure, unspecified HF chronicity, unspecified heart failure type (HCC)    Obesity (BMI 30-39.9)    Acute CVA (cerebrovascular accident) (HCC)    Hypothyroidism (acquired)    Vitamin B12 deficiency    Dyspnea, unspecified type    Patient seen and examined. Awake, alert. No distress. Labs reviewed. On dialysis tolerating well. Ok to run 3 hrs today. Possible d/c tomorrow. Continue hd m/w/f      Mary Harden MD

## 2022-04-08 NOTE — SIGNIFICANT NOTE
04/08/22 1208   OTHER   Discipline physical therapist   Rehab Time/Intention   Session Not Performed patient unavailable for treatment  (in dialysis)   Recommendation   PT - Next Appointment 04/11/22

## 2022-04-08 NOTE — PROGRESS NOTES
Daily Progress Note        Acute on chronic systolic congestive heart failure (HCC)    S/P CABG (coronary artery bypass graft)    Essential hypertension    Status post coronary artery stent placement    ANNETTE treated with BiPAP    Major depressive disorder, recurrent, mild (HCC)    Mixed hyperlipidemia    History of cerebrovascular accident    History of amputation of lesser toe (HCC)    Flaccid hemiplegia of right dominant side as late effect of cerebral infarction (HCC)    Diabetic neuropathy (HCC)    Unspecified dementia with behavioral disturbance (HCC)    Coronary artery disease    Obesity    Vitamin D deficiency    Chronic venous hypertension (idiopathic) with ulcer and inflammation of bilateral lower extremity (HCC)    Longstanding persistent atrial fibrillation (HCC)    Type 2 diabetes mellitus with hyperglycemia (HCC)    Anemia of renal disease    End stage renal disease (HCC)    Dependence on intermittent renal dialysis (HCC)    Congestive heart failure, unspecified HF chronicity, unspecified heart failure type (HCC)    Obesity (BMI 30-39.9)    Acute CVA (cerebrovascular accident) (HCC)    Hypothyroidism (acquired)    Vitamin B12 deficiency    Dyspnea, unspecified type      Assessment    Chronic large right pleural effusion, pleural fluid is transudate with predominant lymphocytes compatible with chronicity, low pH with normal glucose, cultures are negative    Chronic obstructive pulmonary disease  Obstructive sleep apnea, noncompliant with CPAP  Pulmonary hypertension    Acute on chronic systolic CHF- Elevated proBNP> 70,000.0  Last echo 3/13/2022  -EF 26-30% with severely decreased LV systolic function  -RVSP 50  -Right ventricular cavity is mild to moderately dilated  -Moderate left pleural effusion noted     End-stage renal disease-HD M/W/F    HTN  Type 2 diabetes  CAD-S/P CABG  Hyperlipidemia  Chronic A. fib  Hypothyroidism  Tremors  Depression  Dementia  Vitamin B12 and D  deficiencies  Obesity    History of cerebral hemorrhage-residual flaccid hemiplegia right side with vision loss  Cytokine release syndrome, grade 1     3/29/2022 respiratory panel was negative     Plan     Chest tube removed today    Continue to titrate oxygen-  BiPAP as needed    Hemodialysis, MWF  BP control, midodrine 10 mg 3 times a day     Pulmicort nebulizer 2 times a day  DuoNebs 3 times a day  Mucinex  Synthroid 50 mcg  Electrolyte/Glycemic control  Anticoagulation Eliquis  GI prophylaxis Protonix     4/5/2022                                                                 3/29/2022          LOS: 10 days     Subjective         Objective     Vital signs for last 24 hours:  Vitals:    04/07/22 2104 04/08/22 0113 04/08/22 0500 04/08/22 0541   BP: 102/56 122/74  112/72   BP Location: Right arm Right arm  Right arm   Patient Position: Lying Lying  Lying   Pulse: 113 108  110   Resp: 16 16  17   Temp: 98 °F (36.7 °C) 98 °F (36.7 °C)  97.7 °F (36.5 °C)   TempSrc: Oral Oral  Oral   SpO2: 95% 93%  94%   Weight:  111 kg (244 lb 0.8 oz) 111 kg (244 lb 0.8 oz)    Height:           Intake/Output last 3 shifts:  I/O last 3 completed shifts:  In: 600 [P.O.:600]  Out: 180 [Chest Tube:180]  Intake/Output this shift:  No intake/output data recorded.      Radiology  Imaging Results (Last 24 Hours)     Procedure Component Value Units Date/Time    XR Chest 1 View [[id number]] Resulted: 04/08/22 0433     Updated: 04/08/22 0441    XR Chest 1 View [[id number]] Collected: 04/07/22 0817     Updated: 04/07/22 0829    Narrative:      EXAMINATION: XR CHEST 1 VW-     DATE OF EXAM: 4/7/2022 4:19 AM     INDICATION: Chest tube; R06.00-Dyspnea, unspecified; I50.9-Heart  failure, unspecified; J96.01-Acute respiratory failure with hypoxia;  J96.02-Acute respiratory failure with hypercapnia.     COMPARISON: Chest radiograph dated 4/6/2022     TECHNIQUE: Portable AP view of the chest was obtained.     FINDINGS:  There is a right IJ central line  with tip terminating at the low SVC.  There is a smallbore chest tube with tip terminating in the medial right  hemithorax. The cardiomediastinal silhouette is within normal limits.  There is aortic arch atherosclerotic calcification. There are  postsurgical changes of CABG. Median sternotomy wires are present and  intact. There are prominent interstitial markings which appear unchanged  from the prior examination. There is a tiny medial 7 mm pneumothorax on  the right. There is a layering left-sided pleural effusion which appears  unchanged..       Impression:      1. Smallbore chest tube in expected position. Small 7 mm medial  right-sided chest tubes present.  2. Cardiomegaly with prominent interstitial markings likely related to  interstitial edema pattern and small left-sided pleural effusion.     Electronically Signed By-Anselmo Vaz MD On:4/7/2022 8:26 AM  This report was finalized on [id number] by  Anselmo Vaz MD.          Labs:  Results from last 7 days   Lab Units 04/08/22  0517   WBC 10*3/mm3 5.40   HEMOGLOBIN g/dL 11.7*   HEMATOCRIT % 35.4*   PLATELETS 10*3/mm3 200     Results from last 7 days   Lab Units 04/08/22  0454   SODIUM mmol/L 133*   POTASSIUM mmol/L 5.1   CHLORIDE mmol/L 98   CO2 mmol/L 21.0*   BUN mg/dL 29*   CREATININE mg/dL 3.47*   CALCIUM mg/dL 8.3*   GLUCOSE mg/dL 146*                                       Meds:   SCHEDULE  apixaban, 5 mg, Oral, Q12H  atorvastatin, 40 mg, Oral, Nightly  budesonide, 0.5 mg, Nebulization, BID  docusate sodium, 100 mg, Oral, Daily  donepezil, 10 mg, Oral, Nightly  guaiFENesin, 600 mg, Oral, BID  insulin lispro, 0-14 Units, Subcutaneous, 4x Daily With Meals & Nightly  ipratropium-albuterol, 3 mL, Nebulization, TID - RT  levothyroxine, 50 mcg, Oral, Q AM  metoprolol tartrate, 37.5 mg, Oral, BID  midodrine, 10 mg, Oral, TID AC  pantoprazole, 40 mg, Oral, QAM AC  primidone, 25 mg, Oral, Daily  sertraline, 50 mg, Oral, Daily      Infusions      PRNs  calcium carbonate  •  dextrose  •  dextrose  •  glucagon (human recombinant)  •  heparin (porcine)  •  insulin lispro **AND** insulin lispro  •  [COMPLETED] Insert peripheral IV **AND** sodium chloride    Physical Exam:  Physical Exam  Vitals reviewed.   Pulmonary:      Breath sounds: Decreased breath sounds present.      Comments: Presence of chest tube  Musculoskeletal:         General: Swelling present.      Right lower leg: Edema present.      Left lower leg: Edema present.   Skin:     General: Skin is warm and dry.   Neurological:      Mental Status: He is alert.         ROS  Review of Systems   Respiratory: Positive for shortness of breath.    Cardiovascular: Positive for leg swelling.     I reviewed the patient's new clinical results    Electronically signed by FABIOLA Bui

## 2022-04-08 NOTE — PROGRESS NOTES
[facility] Medicine Services Daily Progress Note    Patient Name: Benson Mclean  : 1950  MRN: [id number]  Primary Care Physician:  Rudolph Rooney MD  Date of admission: 3/29/2022      Subjective      Chief Complaint: Shortness of breath    Patient seen and examined this morning.  Doing well, breathing continues to improve. No other complaints.    Pertinent positives as noted in HPI/subjective.  All other systems were reviewed and are negative.      Objective      Vitals:   Temp:  [97.6 °F (36.4 °C)-98.3 °F (36.8 °C)] 98 °F (36.7 °C)  Heart Rate:  [102-114] 113  Resp:  [16-23] 16  BP: (102-132)/(50-86) 102/56  Flow (L/min):  [1] 1    Physical Exam:    General: Awake and alert, lying in bed, chronically ill-appearing, NAD  Eyes: PERRL, EOMI, conjunctive are clear  Cardiovascular: Regular rate and rhythm, no murmurs  Respiratory: Decreased breath sounds bilaterally, improving, no wheezing, unlabored breathing  Abdomen: Soft, obese, nontender, positive bowel sounds, no guarding  Neurologic: A&O, CN grossly intact, moves all extremities spontaneously  Musculoskeletal: Generalized weakness with right greater than left noted, no gross deformities  Skin: Warm, dry, intact         Result Review    Result Review:  I have personally reviewed the results from the time of this admission to 2022 00:32 EDT and agree with these findings:  [x]  Laboratory  [x]  Microbiology  [x]  Radiology  [x]  EKG/Telemetry   []  Cardiology/Vascular   []  Pathology  []  Old records  []  Other:    Wounds (last 24 hours)     LDA Wound     Row Name 22 1600 22 1200       Wound 22 0340 perineum Pressure Injury    Wound - Properties Group Placement Date: 22  -AS Placement Time: 340  -AS Present on Hospital Admission: Y  -AS Location: perineum  -AS Primary Wound Type: Pressure inj  -AS    Dressing Appearance -- open to air  -MS open to air  -MS    Closure Open to air  -KR --  --    Base red  -KR -- --    Dressing Care -- open to air  -MS open to air  -MS    Retired Wound - Properties Group Placement Date: 03/30/22  -AS Placement Time: 0340  -AS Present on Hospital Admission: Y  -AS Location: perineum  -AS Primary Wound Type: Pressure inj  -AS    Retired Wound - Properties Group Date first assessed: 03/30/22  -AS Time first assessed: 0340  -AS Present on Hospital Admission: Y  -AS Location: perineum  -AS Primary Wound Type: Pressure inj  -AS       Wound 03/30/22 0341 Left posterior thigh    Wound - Properties Group Placement Date: 03/30/22  -AS Placement Time: 0341  -AS Present on Hospital Admission: Y  -AS Side: Left  -AS Orientation: posterior  -AS Location: thigh  -AS    Dressing Appearance -- open to air  -MS open to air  -MS    Closure Open to air  -KR -- --    Base scab  -KR scab  -MS scab  -MS    Dressing Care -- open to air  -MS open to air  -MS    Retired Wound - Properties Group Placement Date: 03/30/22  -AS Placement Time: 0341  -AS Present on Hospital Admission: Y  -AS Side: Left  -AS Orientation: posterior  -AS Location: thigh  -AS    Retired Wound - Properties Group Date first assessed: 03/30/22  -AS Time first assessed: 0341  -AS Present on Hospital Admission: Y  -AS Side: Left  -AS Location: thigh  -AS       Wound 03/30/22 0342 Right popliteal    Wound - Properties Group Placement Date: 03/30/22  -AS Placement Time: 0342  -AS Present on Hospital Admission: Y  -AS Side: Right  -AS Location: popliteal  -AS    Dressing Appearance -- open to air  -MS open to air  -MS    Closure Open to air  -KR -- --    Base scab  -KR -- --    Dressing Care -- open to air  -MS open to air  -MS    Retired Wound - Properties Group Placement Date: 03/30/22  -AS Placement Time: 0342  -AS Present on Hospital Admission: Y  -AS Side: Right  -AS Location: popliteal  -AS    Retired Wound - Properties Group Date first assessed: 03/30/22  -AS Time first assessed: 0342  -AS Present on Hospital Admission:  Y  -AS Side: Right  -AS Location: popliteal  -AS       Wound 03/30/22 0343 Left heel    Wound - Properties Group Placement Date: 03/30/22  -AS Placement Time: 0343  -AS Present on Hospital Admission: Y  -AS Side: Left  -AS Location: heel  -AS    Dressing Appearance -- open to air  -MS open to air  -MS    Closure Open to air  -KR -- --    Dressing Care -- open to air  -MS open to air  -MS    Retired Wound - Properties Group Placement Date: 03/30/22  -AS Placement Time: 0343  -AS Present on Hospital Admission: Y  -AS Side: Left  -AS Location: heel  -AS    Retired Wound - Properties Group Date first assessed: 03/30/22  -AS Time first assessed: 0343  -AS Present on Hospital Admission: Y  -AS Side: Left  -AS Location: heel  -AS       Wound 03/30/22 0348 Right anterior ankle    Wound - Properties Group Placement Date: 03/30/22  -AS Placement Time: 0348  -AS Present on Hospital Admission: Y  -AS Side: Right  -AS Orientation: anterior  -AS Location: ankle  -AS    Dressing Appearance -- open to air  -MS open to air  -MS    Closure Open to air  -KR -- --    Dressing Care -- open to air  -MS open to air  -MS    Retired Wound - Properties Group Placement Date: 03/30/22  -AS Placement Time: 0348  -AS Present on Hospital Admission: Y  -AS Side: Right  -AS Orientation: anterior  -AS Location: ankle  -AS    Retired Wound - Properties Group Date first assessed: 03/30/22  -AS Time first assessed: 0348  -AS Present on Hospital Admission: Y  -AS Side: Right  -AS Location: ankle  -AS       Wound 03/30/22 0349 Left antecubital    Wound - Properties Group Placement Date: 03/30/22  -AS Placement Time: 0349  -AS Present on Hospital Admission: Y  -AS Side: Left  -AS Location: antecubital  -AS    Dressing Appearance -- open to air  -MS open to air  -MS    Closure Open to air  -KR -- --    Dressing Care -- open to air  -MS open to air  -MS    Retired Wound - Properties Group Placement Date: 03/30/22  -AS Placement Time: 0349  -AS Present on  Hospital Admission: Y  -AS Side: Left  -AS Location: antecubital  -AS    Retired Wound - Properties Group Date first assessed: 03/30/22  -AS Time first assessed: 0349  -AS Present on Hospital Admission: Y  -AS Side: Left  -AS Location: antecubital  -AS    Row Name 04/07/22 0800 04/07/22 0400          Wound 03/30/22 0340 perineum Pressure Injury    Wound - Properties Group Placement Date: 03/30/22  -AS Placement Time: 0340  -AS Present on Hospital Admission: Y  -AS Location: perineum  -AS Primary Wound Type: Pressure inj  -AS     Dressing Appearance open to air  -MS --     Closure -- Open to air  -KR     Base --  -MS red  -KR     Dressing Care open to air  -MS --     Retired Wound - Properties Group Placement Date: 03/30/22  -AS Placement Time: 0340  -AS Present on Hospital Admission: Y  -AS Location: perineum  -AS Primary Wound Type: Pressure inj  -AS     Retired Wound - Properties Group Date first assessed: 03/30/22  -AS Time first assessed: 0340  -AS Present on Hospital Admission: Y  -AS Location: perineum  -AS Primary Wound Type: Pressure inj  -AS            Wound 03/30/22 0341 Left posterior thigh    Wound - Properties Group Placement Date: 03/30/22  -AS Placement Time: 0341  -AS Present on Hospital Admission: Y  -AS Side: Left  -AS Orientation: posterior  -AS Location: thigh  -AS     Dressing Appearance open to air  -MS --     Base scab  -MS --     Dressing Care open to air  -MS --     Retired Wound - Properties Group Placement Date: 03/30/22  -AS Placement Time: 0341  -AS Present on Hospital Admission: Y  -AS Side: Left  -AS Orientation: posterior  -AS Location: thigh  -AS     Retired Wound - Properties Group Date first assessed: 03/30/22  -AS Time first assessed: 0341  -AS Present on Hospital Admission: Y  -AS Side: Left  -AS Location: thigh  -AS            Wound 03/30/22 0342 Right popliteal    Wound - Properties Group Placement Date: 03/30/22  -AS Placement Time: 0342  -AS Present on Hospital Admission: Y   -AS Side: Right  -AS Location: popliteal  -AS     Dressing Appearance open to air  -MS --     Dressing Care open to air  -MS --     Retired Wound - Properties Group Placement Date: 03/30/22  -AS Placement Time: 0342  -AS Present on Hospital Admission: Y  -AS Side: Right  -AS Location: popliteal  -AS     Retired Wound - Properties Group Date first assessed: 03/30/22  -AS Time first assessed: 0342  -AS Present on Hospital Admission: Y  -AS Side: Right  -AS Location: popliteal  -AS            Wound 03/30/22 0343 Left heel    Wound - Properties Group Placement Date: 03/30/22  -AS Placement Time: 0343  -AS Present on Hospital Admission: Y  -AS Side: Left  -AS Location: heel  -AS     Dressing Appearance open to air  -MS --     Dressing Care open to air  -MS --     Retired Wound - Properties Group Placement Date: 03/30/22  -AS Placement Time: 0343  -AS Present on Hospital Admission: Y  -AS Side: Left  -AS Location: heel  -AS     Retired Wound - Properties Group Date first assessed: 03/30/22  -AS Time first assessed: 0343  -AS Present on Hospital Admission: Y  -AS Side: Left  -AS Location: heel  -AS            Wound 03/30/22 0348 Right anterior ankle    Wound - Properties Group Placement Date: 03/30/22  -AS Placement Time: 0348  -AS Present on Hospital Admission: Y  -AS Side: Right  -AS Orientation: anterior  -AS Location: ankle  -AS     Dressing Appearance open to air  -MS --     Dressing Care open to air  -MS --     Retired Wound - Properties Group Placement Date: 03/30/22  -AS Placement Time: 0348  -AS Present on Hospital Admission: Y  -AS Side: Right  -AS Orientation: anterior  -AS Location: ankle  -AS     Retired Wound - Properties Group Date first assessed: 03/30/22  -AS Time first assessed: 0348  -AS Present on Hospital Admission: Y  -AS Side: Right  -AS Location: ankle  -AS            Wound 03/30/22 0349 Left antecubital    Wound - Properties Group Placement Date: 03/30/22  -AS Placement Time: 0349  -AS Present on  Hospital Admission: Y  -AS Side: Left  -AS Location: antecubital  -AS     Dressing Appearance open to air  -MS --     Dressing Care open to air  -MS --     Retired Wound - Properties Group Placement Date: 03/30/22  -AS Placement Time: 0349  -AS Present on Hospital Admission: Y  -AS Side: Left  -AS Location: antecubital  -AS     Retired Wound - Properties Group Date first assessed: 03/30/22  -AS Time first assessed: 0349  -AS Present on Hospital Admission: Y  -AS Side: Left  -AS Location: antecubital  -AS           User Key  (r) = Recorded By, (t) = Taken By, (c) = Cosigned By    Initials Name Provider Type    Judy Cueva, RN Registered Nurse    AS Maricruz Mi, RN Registered Nurse    Misti Anders RN Registered Nurse                  Assessment/Plan      Brief Patient Summary:  Benson Mclean is a 71 y.o. male with multiple medical issues including ESRD on HD [place], chronic systolic congestive heart failure, CAD s/p CABG, cardiac stent, chronic atrial fibrillation, CVA with residual right-sided weakness, COPD on chronic oxygen, ANNETTE on CPap, previous PE,  HTN, HLD, type 2 diabetes mellitus complicated by neuropathy, anemia of renal disease, vitamin deficiencies, and dementia who presented to [facility] on 3/29/2022 complaining of shortness of breath.    Patient is poor historian but states he has been feeling shortness of breath for past few days.  Unknown of last dialysis.  Was recently here for CHF exacerbation and was diagnosed with ischemic CVA at the time with mild hemorrhagic conversion.  Was discharged to rehab and plan for restarting Eliquis if repeat CT head is stable per neurology recommendations.  CT of the head on admission is stable.  On admission patient severely hypoxic and placed on high flow oxygen.  Imaging report noted to have large right-sided pleural effusion, pulmonology and nephrology consulted.  Chest tube placed by pulmonology on 3/30.   Transudate fluid with Cultures negative so far.      apixaban, 5 mg, Oral, Q12H  atorvastatin, 40 mg, Oral, Nightly  budesonide, 0.5 mg, Nebulization, BID  docusate sodium, 100 mg, Oral, Daily  donepezil, 10 mg, Oral, Nightly  guaiFENesin, 600 mg, Oral, BID  insulin lispro, 0-14 Units, Subcutaneous, 4x Daily With Meals & Nightly  ipratropium-albuterol, 3 mL, Nebulization, TID - RT  levothyroxine, 50 mcg, Oral, Q AM  metoprolol tartrate, 37.5 mg, Oral, BID  midodrine, 10 mg, Oral, TID AC  pantoprazole, 40 mg, Oral, QAM AC  primidone, 25 mg, Oral, Daily  sertraline, 50 mg, Oral, Daily             Active Hospital Problems:  Active Hospital Problems    Diagnosis    • **Acute on chronic systolic congestive heart failure (HCC)    • Dyspnea, unspecified type    • Vitamin B12 deficiency    • Hypothyroidism (acquired)    • Acute CVA (cerebrovascular accident) ([facility])      Right occipital with residual vision loss     • Obesity (BMI 30-39.9)    • Congestive heart failure, unspecified HF chronicity, unspecified heart failure type ([facility])    • Dependence on intermittent renal dialysis ([facility])    • End stage renal disease ([facility])    • Anemia of renal disease    • Type 2 diabetes mellitus with hyperglycemia ([facility])    • Mixed hyperlipidemia    • Obesity    • History of cerebrovascular accident    • Major depressive disorder, recurrent, mild ([facility])    • Flaccid hemiplegia of right dominant side as late effect of cerebral infarction ([facility])    • Longstanding persistent atrial fibrillation ([facility])    • Unspecified dementia with behavioral disturbance ([facility])    • Essential hypertension    • S/P CABG (coronary artery bypass graft)    • Chronic venous hypertension (idiopathic) with ulcer and inflammation of bilateral lower extremity ([facility])    • Coronary artery disease    • Vitamin D deficiency    • History of amputation of lesser toe ([facility])    • ANNETTE treated with BiPAP    • Diabetic neuropathy ([facility])    • Status post coronary artery stent placement       Plan:     Acute on chronic hypoxic and hypercapnic respiratory failure, improving  Large right-sided pleural effusion  Acute on chronic HFrEF  -Multifactorial with underlying ANNETTE, and CHF with morbid obesity  -Imaging reports noted  -s/p right chest tube placement by pulmonology on 3/30, transudate fluid noted  -ultrafiltration per nephrology  -Patient does not make urine, Lasix discontinued  -Continue other home meds  -Weaned down to nasal cannula  -Pulmonology following, palliative care following, remains full code     ESRD on HD  -Ultrafiltration as tolerated  -Midodrine for blood pressure support  -Continue dialysis support per nephrology    Recent CVA  -Diagnosed during last admission, previous imaging reports and discharge summary noted  -Has some residual right-sided deficits  -Repeat CT head this admission is stable, no hemorrhage noted  -Per previous notes, plan was to start Eliquis for his A. fib if repeat CT is stable per neurology recommendations  -Started on Eliquis this admission    PAF  -Rate controlled currently, continue home meds  -Eliquis started  -Monitor on telemetry     DM2  Dysphagia  -Patient much more awake and alert, s/p VFSS, on diet per ST  -Basal insulin stopped due to hypoglycemia, resume when able  -Continue sliding scale, monitor blood glucose  -Adjust as needed    Hypothyroidism  -Continue home meds     Major depressive disorder, recurrent, mild   -Continue home meds, monitor     Obesity (BMI 30-39.9)  BMI 36.92  -Lifestyle modifications to reduce cardiovascular risk factors    DVT ppx  -Eliquis       CODE STATUS:    Code Status (Patient has no pulse and is not breathing): CPR (Attempt to Resuscitate)  Medical Interventions (Patient has pulse or is breathing): Full Support      Disposition: Rehab placement, needs pre-CERT    Electronically signed by Mari Bernal MD, 04/08/22, 00:32 EDT.  [facility] Hospitalist Team

## 2022-04-08 NOTE — CASE MANAGEMENT/SOCIAL WORK
Continued Stay Note  DOREEN Amin     Patient Name: Benson Mclean  MRN: [id number]  Today's Date: 4/8/2022    Admit Date: 3/29/2022     Discharge Plan     Row Name 04/08/22 1519       Plan    Plan D/C Plan: [facility] accepted under Medicaid. From [facility] (spouse did not want return). No precert or PASRR required. OP HD MWF [facility].    Plan Comments Barrier to D/C: chest tube removed today, anticipate d/c once cleared by pulmonology.                    Expected Discharge Date and Time     Expected Discharge Date Expected Discharge Time    Apr 9, 2022         Phone communication or documentation only - no physical contact with patient or family.    Christine Dickerson

## 2022-04-08 NOTE — NURSING NOTE
Patient reported to Maricruz CAMPA that he has not produced urine in months but patient had one incontinent episode of urine early in this shift.  Patient has remained on room air and tolerating well.  No complaints of pain this shift.

## 2022-04-09 NOTE — PROGRESS NOTES
Daily Progress Note        Acute on chronic systolic congestive heart failure (HCC)    S/P CABG (coronary artery bypass graft)    Essential hypertension    Status post coronary artery stent placement    ANNETTE treated with BiPAP    Major depressive disorder, recurrent, mild (HCC)    Mixed hyperlipidemia    History of cerebrovascular accident    History of amputation of lesser toe (HCC)    Flaccid hemiplegia of right dominant side as late effect of cerebral infarction (HCC)    Diabetic neuropathy (HCC)    Unspecified dementia with behavioral disturbance (HCC)    Coronary artery disease    Obesity    Vitamin D deficiency    Chronic venous hypertension (idiopathic) with ulcer and inflammation of bilateral lower extremity (HCC)    Longstanding persistent atrial fibrillation (HCC)    Type 2 diabetes mellitus with hyperglycemia (HCC)    Anemia of renal disease    End stage renal disease (HCC)    Dependence on intermittent renal dialysis (HCC)    Congestive heart failure, unspecified HF chronicity, unspecified heart failure type (HCC)    Obesity (BMI 30-39.9)    Acute CVA (cerebrovascular accident) (HCC)    Hypothyroidism (acquired)    Vitamin B12 deficiency    Dyspnea, unspecified type      Assessment    Chronic large right pleural effusion, pleural fluid is transudate with predominant lymphocytes compatible with chronicity, low pH with normal glucose, cultures are negative    Chronic obstructive pulmonary disease  Obstructive sleep apnea, noncompliant with CPAP  Pulmonary hypertension    Acute on chronic systolic CHF- Elevated proBNP> 70,000.0  Last echo 3/13/2022  -EF 26-30% with severely decreased LV systolic function  -RVSP 50  -Right ventricular cavity is mild to moderately dilated  -Moderate left pleural effusion noted     End-stage renal disease-HD M/W/F    HTN  Type 2 diabetes  CAD-S/P CABG  Hyperlipidemia  Chronic A. fib  Hypothyroidism  Tremors  Depression  Dementia  Vitamin B12 and D  deficiencies  Obesity    History of cerebral hemorrhage-residual flaccid hemiplegia right side with vision loss  Cytokine release syndrome, grade 1     3/29/2022 respiratory panel was negative     Plan     Chest tube removed 4/8/2022    Continue to titrate oxygen-  BiPAP as needed    Hemodialysis, MWF  BP control, midodrine 10 mg 3 times a day     Pulmicort nebulizer 2 times a day  DuoNebs 3 times a day  Mucinex  Synthroid 50 mcg  Electrolyte/Glycemic control  Anticoagulation Eliquis  GI prophylaxis Protonix       4/5/2022                                                                 3/29/2022          LOS: 11 days     Subjective     Shortness of breath    Objective     Vital signs for last 24 hours:  Vitals:    04/09/22 0758 04/09/22 0802 04/09/22 0803 04/09/22 0808   BP:       BP Location:       Patient Position:       Pulse: 115 115 113 115   Resp: 18 18 20 18   Temp:       TempSrc:       SpO2: 95% 95% 95% 96%   Weight:       Height:           Intake/Output last 3 shifts:  I/O last 3 completed shifts:  In: 600 [P.O.:600]  Out: 1555 [Urine:375; Other:1000; Chest Tube:180]  Intake/Output this shift:  No intake/output data recorded.      Radiology  Imaging Results (Last 24 Hours)     Procedure Component Value Units Date/Time    XR Chest 1 View [[id number]] Collected: 04/08/22 1427     Updated: 04/08/22 1430    Narrative:      Examination: XR CHEST 1 VW-     Date of Exam: 4/8/2022 2:07 PM     Indication: Chest tube removal; R06.00-Dyspnea, unspecified; I50.9-Heart  failure, unspecified; J96.01-Acute respiratory failure with hypoxia;  J96.02-Acute respiratory failure with hypercapnia.     Comparison: Radiograph 4/8/2022     Technique: 1 view of the chest      Findings:  The right-sided chest tube is been removed. There are no airspace  consolidations. Probable left basilar atelectasis is present. No there  is a small left-sided pleural effusion. No pneumothorax. The heart size  is normal. The pulmonary vasculature  appears mildly indistinct. No acute  osseous abnormality identified. Median sternotomy wires are present.  There is right internal jugular PermCath with the tip the cavoatrial  junction.       Impression:      Interval removal of right-sided chest tube. No pneumothorax identified.  There is a mild pulmonary edema pattern with a small left-sided pleural  effusion with probable overlying atelectasis.     Electronically Signed By-Angelica Neves MD On:4/8/2022 2:28 PM  This report was finalized on [id number] by  Angelica Neves MD.          Labs:  Results from last 7 days   Lab Units 04/09/22  0414   WBC 10*3/mm3 5.20   HEMOGLOBIN g/dL 11.4*   HEMATOCRIT % 35.1*   PLATELETS 10*3/mm3 213     Results from last 7 days   Lab Units 04/09/22 0414   SODIUM mmol/L 134*   POTASSIUM mmol/L 4.2   CHLORIDE mmol/L 99   CO2 mmol/L 22.0   BUN mg/dL 18   CREATININE mg/dL 2.90*   CALCIUM mg/dL 8.6   GLUCOSE mg/dL 167*                                       Meds:   SCHEDULE  apixaban, 5 mg, Oral, Q12H  atorvastatin, 40 mg, Oral, Nightly  budesonide, 0.5 mg, Nebulization, BID  docusate sodium, 100 mg, Oral, Daily  donepezil, 10 mg, Oral, Nightly  guaiFENesin, 600 mg, Oral, BID  insulin lispro, 0-14 Units, Subcutaneous, 4x Daily With Meals & Nightly  ipratropium-albuterol, 3 mL, Nebulization, TID - RT  levothyroxine, 50 mcg, Oral, Q AM  metoprolol tartrate, 37.5 mg, Oral, BID  midodrine, 10 mg, Oral, TID AC  pantoprazole, 40 mg, Oral, QAM AC  primidone, 25 mg, Oral, Daily  sertraline, 50 mg, Oral, Daily      Infusions     PRNs  •  acetaminophen  •  calcium carbonate  •  dextrose  •  dextrose  •  glucagon (human recombinant)  •  heparin (porcine)  •  insulin lispro **AND** insulin lispro  •  [COMPLETED] Insert peripheral IV **AND** sodium chloride    Physical Exam:  Physical Exam  Vitals reviewed.   Pulmonary:      Breath sounds: Decreased breath sounds present.      Comments: Presence of chest tube  Musculoskeletal:         General:  Swelling present.      Right lower leg: Edema present.      Left lower leg: Edema present.   Skin:     General: Skin is warm and dry.   Neurological:      Mental Status: He is alert.         ROS  Review of Systems   Respiratory: Positive for shortness of breath.    Cardiovascular: Positive for leg swelling.   Constitutional: Negative for chills, fever and malaise/fatigue.   HENT: Negative.    Eyes: Negative.      Skin: Negative.    Musculoskeletal: Negative.    Gastrointestinal: Negative.    Genitourinary: Negative.    Neurological: Chronic right-sided weakness  Psychiatric/Behavioral: Negative.    I reviewed the patient's new clinical results

## 2022-04-09 NOTE — PLAN OF CARE
Problem: Adult Inpatient Plan of Care  Goal: Plan of Care Review  Outcome: Ongoing, Progressing  Flowsheets (Taken 4/8/2022 6785)  Progress: improving   Goal Outcome Evaluation:           Progress: improving       Pt A&O. Dialysis tx this shift. 1L taken off. Chest tube removed and pt tolerating well, drsg in place. Pt c/o r) ear ache, pt was asked if he'd like tylenol or warm compress and he refused both. Nurse made aware during report. Will cont to monitor. HR MD Dolores lundberg made aware. Pt asymptomatic.

## 2022-04-09 NOTE — PLAN OF CARE
Problem: Adult Inpatient Plan of Care  Goal: Plan of Care Review  Outcome: Ongoing, Progressing  Flowsheets (Taken 4/9/2022 5295)  Progress: no change  Goal: Patient-Specific Goal (Individualized)  Outcome: Ongoing, Progressing  Goal: Absence of Hospital-Acquired Illness or Injury  Outcome: Ongoing, Progressing  Goal: Optimal Comfort and Wellbeing  Outcome: Ongoing, Progressing  Goal: Readiness for Transition of Care  Outcome: Ongoing, Progressing     Problem: Fall Injury Risk  Goal: Absence of Fall and Fall-Related Injury  Outcome: Ongoing, Progressing     Problem: COPD (Chronic Obstructive Pulmonary Disease) Comorbidity  Goal: Maintenance of COPD Symptom Control  Outcome: Ongoing, Progressing     Problem: Diabetes Comorbidity  Goal: Blood Glucose Level Within Targeted Range  Outcome: Ongoing, Progressing     Problem: Heart Failure Comorbidity  Goal: Maintenance of Heart Failure Symptom Control  Outcome: Ongoing, Progressing     Problem: Hypertension Comorbidity  Goal: Blood Pressure in Desired Range  Outcome: Ongoing, Progressing     Problem: Pain Chronic (Persistent) (Comorbidity Management)  Goal: Acceptable Pain Control and Functional Ability  Outcome: Ongoing, Progressing     Problem: Skin Injury Risk Increased  Goal: Skin Health and Integrity  Outcome: Ongoing, Progressing   Goal Outcome Evaluation:   Pt rested throughout shift. Pt complains of pain, see MAR. Q2 turns. Received d/c clearance from nephrology & pulmonology. Pt remains in SR. Will continue to observe.        Progress: no change

## 2022-04-09 NOTE — PROGRESS NOTES
Name: Benson Mclean  Age: 71 y.o.  : 1950  Sex: male    22    Subjective  Resting comfortably no complaints     Interval History   No acute events overnight      Objective:    Vital Signs  Temp:  [97.6 °F (36.4 °C)-98.7 °F (37.1 °C)] 97.6 °F (36.4 °C)  Heart Rate:  [] 114  Resp:  [18-20] 18  BP: ()/(55-63) 116/63        Intake/Output Summary (Last 24 hours) at 2022 1508  Last data filed at 2022 0400  Gross per 24 hour   Intake 240 ml   Output 375 ml   Net -135 ml           Physical Exam  Physical Exam       Results Review:      Results from last 7 days   Lab Units 22  0454 22  0628 22  0437 22  0414   SODIUM mmol/L 134* 133* 136 136 135*   CO2 mmol/L 22.0 21.0* 23.0 22.0 22.0   BUN mg/dL 18 29* 21 27* 22   CREATININE mg/dL 2.90* 3.47* 3.07* 3.46* 2.94*   CALCIUM mg/dL 8.6 8.3* 8.2* 8.2* 8.2*   EGFR mL/min/1.73 22.4* 18.1* 20.9* 18.1* 22.1*       Results from last 7 days   Lab Units 22  0414 22  0517 22  0628 22  0437 22  0414 22  0449 22  0511   WBC 10*3/mm3 5.20 5.40 5.80 6.90 5.50 5.20 5.10       Imaging studies: I personally reviewed the patient's most recent pertinent imaging studies       Medication Review:   apixaban, 5 mg, Oral, Q12H  atorvastatin, 40 mg, Oral, Nightly  budesonide, 0.5 mg, Nebulization, BID  docusate sodium, 100 mg, Oral, Daily  donepezil, 10 mg, Oral, Nightly  guaiFENesin, 600 mg, Oral, BID  insulin lispro, 0-14 Units, Subcutaneous, 4x Daily With Meals & Nightly  ipratropium-albuterol, 3 mL, Nebulization, TID - RT  levothyroxine, 50 mcg, Oral, Q AM  metoprolol tartrate, 37.5 mg, Oral, BID  midodrine, 10 mg, Oral, TID AC  pantoprazole, 40 mg, Oral, QAM AC  primidone, 25 mg, Oral, Daily  sertraline, 50 mg, Oral, Daily          Assessment  End-stage renal disease    Coronary artery disease status post CABG    Hypertension    Diabetes    History of CVA    Hypotension  On  midodrine    History of CHF  EF of 25 to 30%      Plan:  Continue hemodialysis on a Monday Wednesday Friday basis    Stable from renal standpoint for discharge    Dinh Ceballos MD  04/09/22  15:08 EDT  Tel: [phone]  Fax: [phone]

## 2022-04-09 NOTE — PLAN OF CARE
Goal Outcome Evaluation:    Pt has remained on room air and has been tolerating well.  Patient has been complaining of right ear aching.  On call provider ordered tylenol, but patient still is having pain.

## 2022-04-09 NOTE — PROGRESS NOTES
[facility] Medicine Services Daily Progress Note    Patient Name: Benson Mclean  : 1950  MRN: [id number]  Primary Care Physician:  Rudolph Rooney MD  Date of admission: 3/29/2022      Subjective      Chief Complaint: Shortness of breath    Patient seen and examined this morning.  Doing well, breathing continues to improve. No other complaints.    Pertinent positives as noted in HPI/subjective.  All other systems were reviewed and are negative.      Objective      Vitals:   Temp:  [96.6 °F (35.9 °C)-98.7 °F (37.1 °C)] 98.7 °F (37.1 °C)  Heart Rate:  [108-117] 117  Resp:  [16-20] 20  BP: ()/(50-74) 98/62    Physical Exam:    General: Awake and alert, lying in bed, chronically ill-appearing, NAD  Eyes: PERRL, EOMI, conjunctive are clear  Cardiovascular: Regular rate and rhythm, no murmurs  Respiratory: Decreased breath sounds bilaterally, improving, no wheezing, unlabored breathing  Abdomen: Soft, obese, nontender, positive bowel sounds, no guarding  Neurologic: A&O, CN grossly intact, moves all extremities spontaneously  Musculoskeletal: Generalized weakness with right greater than left noted, no gross deformities  Skin: Warm, dry, intact         Result Review    Result Review:  I have personally reviewed the results from the time of this admission to 2022 23:26 EDT and agree with these findings:  [x]  Laboratory  [x]  Microbiology  [x]  Radiology  [x]  EKG/Telemetry   []  Cardiology/Vascular   []  Pathology  []  Old records  []  Other:    Wounds (last 24 hours)     LDA Wound     Row Name 22 0800 22 0400       Wound 22 0340 perineum Pressure Injury    Wound - Properties Group Placement Date: 22  -AS Placement Time: 340  -AS Present on Hospital Admission: Y  -AS Location: perineum  -AS Primary Wound Type: Pressure inj  -AS    Closure Open to air  -KR Open to air  -JM Open to air  -KR    Base --  -KR red  -JM red  -KR    Periwound  Temperature -- warm  -JM --    Periwound Skin Turgor -- soft  -JM --    Drainage Amount -- none  -JM --    Retired Wound - Properties Group Placement Date: 03/30/22  -AS Placement Time: 0340  -AS Present on Hospital Admission: Y  -AS Location: perineum  -AS Primary Wound Type: Pressure inj  -AS    Retired Wound - Properties Group Date first assessed: 03/30/22  -AS Time first assessed: 0340  -AS Present on Hospital Admission: Y  -AS Location: perineum  -AS Primary Wound Type: Pressure inj  -AS       Wound 03/30/22 0341 Left posterior thigh    Wound - Properties Group Placement Date: 03/30/22  -AS Placement Time: 0341  -AS Present on Hospital Admission: Y  -AS Side: Left  -AS Orientation: posterior  -AS Location: thigh  -AS    Closure Open to air  -KR Open to air  -JM Open to air  -KR    Base --  -KR scab  -JM scab  -KR    Drainage Amount -- none  -JM --    Retired Wound - Properties Group Placement Date: 03/30/22  -AS Placement Time: 0341  -AS Present on Hospital Admission: Y  -AS Side: Left  -AS Orientation: posterior  -AS Location: thigh  -AS    Retired Wound - Properties Group Date first assessed: 03/30/22  -AS Time first assessed: 0341  -AS Present on Hospital Admission: Y  -AS Side: Left  -AS Location: thigh  -AS       Wound 03/30/22 0342 Right popliteal    Wound - Properties Group Placement Date: 03/30/22  -AS Placement Time: 0342  -AS Present on Hospital Admission: Y  -AS Side: Right  -AS Location: popliteal  -AS    Closure Open to air  -KR Open to air  -JM Open to air  -KR    Base --  -KR scab  -JM scab  -KR    Drainage Amount -- none  -JM --    Retired Wound - Properties Group Placement Date: 03/30/22  -AS Placement Time: 0342  -AS Present on Hospital Admission: Y  -AS Side: Right  -AS Location: popliteal  -AS    Retired Wound - Properties Group Date first assessed: 03/30/22  -AS Time first assessed: 0342  -AS Present on Hospital Admission: Y  -AS Side: Right  -AS Location: popliteal  -AS       Wound  03/30/22 0343 Left heel    Wound - Properties Group Placement Date: 03/30/22  -AS Placement Time: 0343  -AS Present on Hospital Admission: Y  -AS Side: Left  -AS Location: heel  -AS    Closure Open to air  -KR Open to air  -JM Open to air  -KR    Base -- blanchable;red  -JM --    Drainage Amount -- none  -JM --    Retired Wound - Properties Group Placement Date: 03/30/22  -AS Placement Time: 0343  -AS Present on Hospital Admission: Y  -AS Side: Left  -AS Location: heel  -AS    Retired Wound - Properties Group Date first assessed: 03/30/22  -AS Time first assessed: 0343  -AS Present on Hospital Admission: Y  -AS Side: Left  -AS Location: heel  -AS       Wound 03/30/22 0348 Right anterior ankle    Wound - Properties Group Placement Date: 03/30/22  -AS Placement Time: 0348  -AS Present on Hospital Admission: Y  -AS Side: Right  -AS Orientation: anterior  -AS Location: ankle  -AS    Closure Open to air  -KR Open to air  -JM Open to air  -KR    Base -- scab  -JM --    Drainage Amount -- none  -JM --    Retired Wound - Properties Group Placement Date: 03/30/22  -AS Placement Time: 0348  -AS Present on Hospital Admission: Y  -AS Side: Right  -AS Orientation: anterior  -AS Location: ankle  -AS    Retired Wound - Properties Group Date first assessed: 03/30/22  -AS Time first assessed: 0348  -AS Present on Hospital Admission: Y  -AS Side: Right  -AS Location: ankle  -AS       Wound 03/30/22 0349 Left antecubital    Wound - Properties Group Placement Date: 03/30/22  -AS Placement Time: 0349  -AS Present on Hospital Admission: Y  -AS Side: Left  -AS Location: antecubital  -AS    Closure Open to air  -KR Open to air  -JM Open to air  -KR    Drainage Amount -- none  -JM --    Retired Wound - Properties Group Placement Date: 03/30/22  -AS Placement Time: 0349  -AS Present on Hospital Admission: Y  -AS Side: Left  -AS Location: antecubital  -AS    Retired Wound - Properties Group Date first assessed: 03/30/22  -AS Time first  assessed: 0349  -AS Present on Hospital Admission: Y  -AS Side: Left  -AS Location: antecubital  -AS    Row Name 04/08/22 0000             Wound 03/30/22 0340 perineum Pressure Injury    Wound - Properties Group Placement Date: 03/30/22  -AS Placement Time: 0340  -AS Present on Hospital Admission: Y  -AS Location: perineum  -AS Primary Wound Type: Pressure inj  -AS      Closure Open to air  -AS (r) KR (t) AS (c)      Base red  -AS (r) KR (t) AS (c)      Retired Wound - Properties Group Placement Date: 03/30/22  -AS Placement Time: 0340  -AS Present on Hospital Admission: Y  -AS Location: perineum  -AS Primary Wound Type: Pressure inj  -AS      Retired Wound - Properties Group Date first assessed: 03/30/22  -AS Time first assessed: 0340  -AS Present on Hospital Admission: Y  -AS Location: perineum  -AS Primary Wound Type: Pressure inj  -AS              Wound 03/30/22 0341 Left posterior thigh    Wound - Properties Group Placement Date: 03/30/22  -AS Placement Time: 0341  -AS Present on Hospital Admission: Y  -AS Side: Left  -AS Orientation: posterior  -AS Location: thigh  -AS      Closure Open to air  -AS (r) KR (t) AS (c)      Base scab  -AS (r) KR (t) AS (c)      Retired Wound - Properties Group Placement Date: 03/30/22  -AS Placement Time: 0341  -AS Present on Hospital Admission: Y  -AS Side: Left  -AS Orientation: posterior  -AS Location: thigh  -AS      Retired Wound - Properties Group Date first assessed: 03/30/22  -AS Time first assessed: 0341  -AS Present on Hospital Admission: Y  -AS Side: Left  -AS Location: thigh  -AS              Wound 03/30/22 0342 Right popliteal    Wound - Properties Group Placement Date: 03/30/22  -AS Placement Time: 0342  -AS Present on Hospital Admission: Y  -AS Side: Right  -AS Location: popliteal  -AS      Closure Open to air  -AS (r) KR (t) AS (c)      Base scab  -AS (r) KR (t) AS (c)      Retired Wound - Properties Group Placement Date: 03/30/22  -AS Placement Time: 0342  -AS  Present on Hospital Admission: Y  -AS Side: Right  -AS Location: popliteal  -AS      Retired Wound - Properties Group Date first assessed: 03/30/22  -AS Time first assessed: 0342  -AS Present on Hospital Admission: Y  -AS Side: Right  -AS Location: popliteal  -AS              Wound 03/30/22 0343 Left heel    Wound - Properties Group Placement Date: 03/30/22  -AS Placement Time: 0343  -AS Present on Hospital Admission: Y  -AS Side: Left  -AS Location: heel  -AS      Closure Open to air  -AS (r) KR (t) AS (c)      Retired Wound - Properties Group Placement Date: 03/30/22  -AS Placement Time: 0343  -AS Present on Hospital Admission: Y  -AS Side: Left  -AS Location: heel  -AS      Retired Wound - Properties Group Date first assessed: 03/30/22  -AS Time first assessed: 0343  -AS Present on Hospital Admission: Y  -AS Side: Left  -AS Location: heel  -AS              Wound 03/30/22 0348 Right anterior ankle    Wound - Properties Group Placement Date: 03/30/22  -AS Placement Time: 0348  -AS Present on Hospital Admission: Y  -AS Side: Right  -AS Orientation: anterior  -AS Location: ankle  -AS      Closure Open to air  -AS (r) KR (t) AS (c)      Retired Wound - Properties Group Placement Date: 03/30/22  -AS Placement Time: 0348  -AS Present on Hospital Admission: Y  -AS Side: Right  -AS Orientation: anterior  -AS Location: ankle  -AS      Retired Wound - Properties Group Date first assessed: 03/30/22  -AS Time first assessed: 0348  -AS Present on Hospital Admission: Y  -AS Side: Right  -AS Location: ankle  -AS              Wound 03/30/22 0349 Left antecubital    Wound - Properties Group Placement Date: 03/30/22  -AS Placement Time: 0349  -AS Present on Hospital Admission: Y  -AS Side: Left  -AS Location: antecubital  -AS      Closure Open to air  -AS (r) KR (t) AS (c)      Retired Wound - Properties Group Placement Date: 03/30/22  -AS Placement Time: 0349  -AS Present on Hospital Admission: Y  -AS Side: Left  -AS Location:  antecubital  -AS      Retired Wound - Properties Group Date first assessed: 03/30/22  -AS Time first assessed: 0349  -AS Present on Hospital Admission: Y  -AS Side: Left  -AS Location: antecubital  -AS            User Key  (r) = Recorded By, (t) = Taken By, (c) = Cosigned By    Initials Name Provider Type    Zainab Morales, RN Registered Nurse    Judy Cueva RN Registered Nurse    AS Maricruz Mi RN Registered Nurse                  Assessment/Plan      Brief Patient Summary:  Benson Mclean is a 71 y.o. 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    Patient is poor historian but states he has been feeling shortness of breath for past few days.  Unknown of last dialysis.  Was recently here for CHF exacerbation and was diagnosed with ischemic CVA at the time with mild hemorrhagic conversion.  Was discharged to rehab and plan for restarting Eliquis if repeat CT head is stable per neurology recommendations.  CT of the head on admission is stable.  On admission patient severely hypoxic and placed on high flow oxygen.  Imaging report noted to have large right-sided pleural effusion, pulmonology and nephrology consulted.  Chest tube placed by pulmonology on 3/30.  Transudate fluid with Cultures negative so far.      apixaban, 5 mg, Oral, Q12H  atorvastatin, 40 mg, Oral, Nightly  budesonide, 0.5 mg, Nebulization, BID  docusate sodium, 100 mg, Oral, Daily  donepezil, 10 mg, Oral, Nightly  guaiFENesin, 600 mg, Oral, BID  insulin lispro, 0-14 Units, Subcutaneous, 4x Daily With  Meals & Nightly  ipratropium-albuterol, 3 mL, Nebulization, TID - RT  levothyroxine, 50 mcg, Oral, Q AM  metoprolol tartrate, 37.5 mg, Oral, BID  midodrine, 10 mg, Oral, TID AC  pantoprazole, 40 mg, Oral, QAM AC  primidone, 25 mg, Oral, Daily  sertraline, 50 mg, Oral, Daily             Active Hospital Problems:  Active Hospital Problems    Diagnosis    • **Acute on chronic systolic congestive heart failure (HCC)    • Dyspnea, unspecified type    • Vitamin B12 deficiency    • Hypothyroidism (acquired)    • Acute CVA (cerebrovascular accident) ([facility])      Right occipital with residual vision loss     • Obesity (BMI 30-39.9)    • Congestive heart failure, unspecified HF chronicity, unspecified heart failure type (HCC)    • Dependence on intermittent renal dialysis ([facility])    • End stage renal disease ([facility])    • Anemia of renal disease    • Type 2 diabetes mellitus with hyperglycemia ([facility])    • Mixed hyperlipidemia    • Obesity    • History of cerebrovascular accident    • Major depressive disorder, recurrent, mild ([facility])    • Flaccid hemiplegia of right dominant side as late effect of cerebral infarction ([facility])    • Longstanding persistent atrial fibrillation ([facility])    • Unspecified dementia with behavioral disturbance ([facility])    • Essential hypertension    • S/P CABG (coronary artery bypass graft)    • Chronic venous hypertension (idiopathic) with ulcer and inflammation of bilateral lower extremity ([facility])    • Coronary artery disease    • Vitamin D deficiency    • History of amputation of lesser toe ([facility])    • ANNETTE treated with BiPAP    • Diabetic neuropathy ([facility])    • Status post coronary artery stent placement      Plan:     Acute on chronic hypoxic and hypercapnic respiratory failure, improving  Large right-sided pleural effusion  Acute on chronic HFrEF  -Multifactorial with underlying ANNETTE, and CHF with morbid obesity  -Imaging reports noted  -s/p right chest tube placement by pulmonology on 3/30, transudate fluid  noted  -ultrafiltration per nephrology  -Patient does not make urine, Lasix discontinued  -Continue other home meds  -Weaned down to nasal cannula  -Pulmonology following, palliative care following, remains full code     ESRD on HD  -Ultrafiltration as tolerated  -Midodrine for blood pressure support  -Continue dialysis support per nephrology    Recent CVA  -Diagnosed during last admission, previous imaging reports and discharge summary noted  -Has some residual right-sided deficits  -Repeat CT head this admission is stable, no hemorrhage noted  -Per previous notes, plan was to start Eliquis for his A. fib if repeat CT is stable per neurology recommendations  -Started on Eliquis this admission    PAF  -Rate controlled currently, continue home meds  -Eliquis started  -Monitor on telemetry     DM2  Dysphagia  -Patient much more awake and alert, s/p VFSS, on diet per ST  -Basal insulin stopped due to hypoglycemia, resume when able  -Continue sliding scale, monitor blood glucose  -Adjust as needed    Hypothyroidism  -Continue home meds     Major depressive disorder, recurrent, mild   -Continue home meds, monitor     Obesity (BMI 30-39.9)  BMI 36.92  -Lifestyle modifications to reduce cardiovascular risk factors    DVT ppx  -Eliquis       CODE STATUS:    Code Status (Patient has no pulse and is not breathing): CPR (Attempt to Resuscitate)  Medical Interventions (Patient has pulse or is breathing): Full Support      Disposition: Rehab placement, needs pre-CERT    Electronically signed by Mari Bernal MD, 04/08/22, 23:26 EDT.  [facility] Hospitalist Team

## 2022-04-10 NOTE — CASE MANAGEMENT/SOCIAL WORK
Continued Stay Note   [place]     Patient Name: Benson Mclean  MRN: [id number]  Today's Date: 4/10/2022    Admit Date: 3/29/2022     Discharge Plan     Row Name 04/10/22 1402       Plan    Plan Dc plan: [facility] accepted under Medicaid. op hd MWF [facility].    Plan Comments DC barrier: bed avail at [facility]- avoidable day entered.  CM texted reginaldo at [facility] to confirm bed was available- she did not respond; CM called the facility who reported they did not have any admissions papers for him or expecting an admit this weekend.                Expected Discharge Date and Time     Expected Discharge Date Expected Discharge Time    Apr 9, 2022         Phone communication or documentation only - no physical contact with patient or family.      Christelle Nayak RN

## 2022-04-10 NOTE — PROGRESS NOTES
[facility] Medicine Services Daily Progress Note    Patient Name: Benson Mclean  : 1950  MRN: [id number]  Primary Care Physician:  Rudolph Rooney MD  Date of admission: 3/29/2022      Subjective      Chief Complaint: Shortness of breath    Patient seen and examined this morning.  Doing well, breathing continues to improve. No other complaints.    Pertinent positives as noted in HPI/subjective.  All other systems were reviewed and are negative.      Objective      Vitals:   Temp:  [97.6 °F (36.4 °C)-98.2 °F (36.8 °C)] 97.6 °F (36.4 °C)  Heart Rate:  [] 106  Resp:  [18-20] 18  BP: ()/(55-63) 97/56    Physical Exam:    General: Awake and alert, lying in bed, chronically ill-appearing, NAD  Eyes: PERRL, EOMI, conjunctive are clear  Cardiovascular: Regular rate and rhythm, no murmurs  Respiratory: Decreased breath sounds bilaterally, improving, no wheezing, unlabored breathing  Abdomen: Soft, obese, nontender, positive bowel sounds, no guarding  Neurologic: A&O, CN grossly intact, moves all extremities spontaneously  Musculoskeletal: Generalized weakness with right greater than left noted, no gross deformities  Skin: Warm, dry, intact         Result Review    Result Review:  I have personally reviewed the results from the time of this admission to 2022 21:02 EDT and agree with these findings:  [x]  Laboratory  [x]  Microbiology  [x]  Radiology  [x]  EKG/Telemetry   []  Cardiology/Vascular   []  Pathology  []  Old records  []  Other:    Wounds (last 24 hours)     LDA Wound     Row Name 22 1615 22 1146 22 0800       Wound 22 0340 perineum Pressure Injury    Wound - Properties Group Placement Date: 22  -AS Placement Time: 340  -AS Present on Hospital Admission: Y  -AS Location: perineum  -AS Primary Wound Type: Pressure inj  -AS    Pressure Injury Stage -- 2  -AD --    Dressing Appearance -- open to air  -AD --    Closure Open to air  -MA  Open to air  -AD Open to air  -AD    Base pink  -MA pink  -AD red  -AD    Periwound Temperature warm  -MA warm  -AD warm  -AD    Periwound Skin Turgor soft  -MA soft  -AD soft  -AD    Drainage Amount none  -MA none  -AD none  -AD    Dressing Care -- dressing applied;silicone  -AD open to air  -AD    Retired Wound - Properties Group Placement Date: 03/30/22  -AS Placement Time: 0340  -AS Present on Hospital Admission: Y  -AS Location: perineum  -AS Primary Wound Type: Pressure inj  -AS    Retired Wound - Properties Group Date first assessed: 03/30/22  -AS Time first assessed: 0340  -AS Present on Hospital Admission: Y  -AS Location: perineum  -AS Primary Wound Type: Pressure inj  -AS       Wound 03/30/22 0341 Left posterior thigh    Wound - Properties Group Placement Date: 03/30/22  -AS Placement Time: 0341  -AS Present on Hospital Admission: Y  -AS Side: Left  -AS Orientation: posterior  -AS Location: thigh  -AS    Dressing Appearance -- open to air  -AD open to air  -AD    Closure Open to air  -MA Open to air  -AD Open to air  -AD    Base scab  -MA scab  -AD --    Drainage Amount none  -MA none  -AD none  -AD    Dressing Care -- open to air  -AD open to air  -AD    Periwound Care -- dry periwound area maintained  -AD dry periwound area maintained  -AD    Retired Wound - Properties Group Placement Date: 03/30/22  -AS Placement Time: 0341  -AS Present on Hospital Admission: Y  -AS Side: Left  -AS Orientation: posterior  -AS Location: thigh  -AS    Retired Wound - Properties Group Date first assessed: 03/30/22  -AS Time first assessed: 0341  -AS Present on Hospital Admission: Y  -AS Side: Left  -AS Location: thigh  -AS       Wound 03/30/22 0342 Right popliteal    Wound - Properties Group Placement Date: 03/30/22  -AS Placement Time: 0342  -AS Present on Hospital Admission: Y  -AS Side: Right  -AS Location: popliteal  -AS    Dressing Appearance -- open to air  -AD open to air  -AD    Closure Open to air  -MA Open to  air  -AD Open to air  -AD    Base scab;red  -MA scab;red  -AD scab  -AD    Drainage Amount none  -MA none  -AD none  -AD    Dressing Care -- open to air  -AD open to air  -AD    Periwound Care -- dry periwound area maintained  -AD dry periwound area maintained  -AD    Retired Wound - Properties Group Placement Date: 03/30/22  -AS Placement Time: 0342  -AS Present on Hospital Admission: Y  -AS Side: Right  -AS Location: popliteal  -AS    Retired Wound - Properties Group Date first assessed: 03/30/22  -AS Time first assessed: 0342  -AS Present on Hospital Admission: Y  -AS Side: Right  -AS Location: popliteal  -AS       Wound 03/30/22 0343 Left heel    Wound - Properties Group Placement Date: 03/30/22  -AS Placement Time: 0343  -AS Present on Hospital Admission: Y  -AS Side: Left  -AS Location: heel  -AS    Dressing Appearance -- open to air  -AD open to air  -AD    Closure Open to air  -MA Open to air  -AD Open to air  -AD    Base red;blanchable  -MA red;blanchable  -AD red  -AD    Drainage Amount none  -MA none  -AD none  -AD    Dressing Care -- open to air  -AD open to air  -AD    Periwound Care -- dry periwound area maintained  -AD dry periwound area maintained  -AD    Retired Wound - Properties Group Placement Date: 03/30/22  -AS Placement Time: 0343  -AS Present on Hospital Admission: Y  -AS Side: Left  -AS Location: heel  -AS    Retired Wound - Properties Group Date first assessed: 03/30/22  -AS Time first assessed: 0343  -AS Present on Hospital Admission: Y  -AS Side: Left  -AS Location: heel  -AS       Wound 03/30/22 0348 Right anterior ankle    Wound - Properties Group Placement Date: 03/30/22  -AS Placement Time: 0348  -AS Present on Hospital Admission: Y  -AS Side: Right  -AS Orientation: anterior  -AS Location: ankle  -AS    Dressing Appearance -- open to air  -AD open to air  -AD    Closure Open to air  -MA Open to air  -AD Open to air  -AD    Base red;scab  -MA red;scab  -AD --    Drainage Amount none   -MA none  -AD none  -AD    Dressing Care -- open to air  -AD open to air  -AD    Periwound Care -- dry periwound area maintained  -AD dry periwound area maintained  -AD    Retired Wound - Properties Group Placement Date: 03/30/22  -AS Placement Time: 0348  -AS Present on Hospital Admission: Y  -AS Side: Right  -AS Orientation: anterior  -AS Location: ankle  -AS    Retired Wound - Properties Group Date first assessed: 03/30/22  -AS Time first assessed: 0348  -AS Present on Hospital Admission: Y  -AS Side: Right  -AS Location: ankle  -AS       Wound 03/30/22 0349 Left antecubital    Wound - Properties Group Placement Date: 03/30/22  -AS Placement Time: 0349  -AS Present on Hospital Admission: Y  -AS Side: Left  -AS Location: antecubital  -AS    Dressing Appearance -- open to air  -AD open to air  -AD    Closure Open to air  -MA Open to air  -AD Open to air  -AD    Base scab;pink  -MA scab;pink  -AD --    Drainage Amount none  -MA none  -AD none  -AD    Dressing Care -- open to air  -AD open to air  -AD    Periwound Care -- dry periwound area maintained  -AD dry periwound area maintained  -AD    Retired Wound - Properties Group Placement Date: 03/30/22  -AS Placement Time: 0349  -AS Present on Hospital Admission: Y  -AS Side: Left  -AS Location: antecubital  -AS    Retired Wound - Properties Group Date first assessed: 03/30/22  -AS Time first assessed: 0349  -AS Present on Hospital Admission: Y  -AS Side: Left  -AS Location: antecubital  -AS       Wound 04/09/22 1201 Right posterior elbow Skin Tear    Wound - Properties Group Placement Date: 04/09/22  -AD Placement Time: 1201  -AD Present on Hospital Admission: N  -AD Side: Right  -AD Orientation: posterior  -AD Location: elbow  -AD Primary Wound Type: Skin tear  -AD    Dressing Appearance -- dry;intact  -AD --    Base bleeding  -MA bleeding  -AD --    Periwound dry;intact  -MA dry;intact  -AD --    Dressing Care -- dressing applied  -AD --    Periwound Care -- dry  periwound area maintained  -AD --    Retired Wound - Properties Group Placement Date: 04/09/22  -AD Placement Time: 1201  -AD Present on Hospital Admission: N  -AD Side: Right  -AD Orientation: posterior  -AD Location: elbow  -AD Primary Wound Type: Skin tear  -AD    Retired Wound - Properties Group Date first assessed: 04/09/22  -AD Time first assessed: 1201  -AD Present on Hospital Admission: N  -AD Side: Right  -AD Location: elbow  -AD Primary Wound Type: Skin tear  -AD          User Key  (r) = Recorded By, (t) = Taken By, (c) = Cosigned By    Initials Name Provider Type    Fabi Mejia RN Registered Nurse    AS Maricruz Mi, RN Registered Nurse    Noy Torres, RN Registered Nurse                  Assessment/Plan      Brief Patient Summary:  Benson Mclean is a 71 y.o. 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    Patient is poor historian but states he has been feeling shortness of breath for past few days.  Unknown of last dialysis.  Was recently here for CHF exacerbation and was diagnosed with ischemic CVA at the time with mild hemorrhagic conversion.  Was discharged to rehab and plan for restarting Eliquis if repeat CT head is stable per neurology recommendations.  CT of the head on admission is stable.  On admission patient severely hypoxic and placed on high flow oxygen.  Imaging report noted to have large right-sided pleural effusion, pulmonology and nephrology consulted.  Chest tube placed by pulmonology on 3/30.  Transudate fluid with Cultures negative so  far.      apixaban, 5 mg, Oral, Q12H  atorvastatin, 40 mg, Oral, Nightly  budesonide, 0.5 mg, Nebulization, BID  docusate sodium, 100 mg, Oral, Daily  donepezil, 10 mg, Oral, Nightly  guaiFENesin, 600 mg, Oral, BID  insulin lispro, 0-14 Units, Subcutaneous, 4x Daily With Meals & Nightly  ipratropium-albuterol, 3 mL, Nebulization, TID - RT  levothyroxine, 50 mcg, Oral, Q AM  metoprolol tartrate, 37.5 mg, Oral, BID  midodrine, 10 mg, Oral, TID AC  pantoprazole, 40 mg, Oral, QAM AC  primidone, 25 mg, Oral, Daily  sertraline, 50 mg, Oral, Daily             Active Hospital Problems:  Active Hospital Problems    Diagnosis    • **Acute on chronic systolic congestive heart failure (HCC)    • Dyspnea, unspecified type    • Vitamin B12 deficiency    • Hypothyroidism (acquired)    • Acute CVA (cerebrovascular accident) ([facility])      Right occipital with residual vision loss     • Obesity (BMI 30-39.9)    • Congestive heart failure, unspecified HF chronicity, unspecified heart failure type ([facility])    • Dependence on intermittent renal dialysis ([facility])    • End stage renal disease ([facility])    • Anemia of renal disease    • Type 2 diabetes mellitus with hyperglycemia ([facility])    • Mixed hyperlipidemia    • Obesity    • History of cerebrovascular accident    • Major depressive disorder, recurrent, mild ([facility])    • Flaccid hemiplegia of right dominant side as late effect of cerebral infarction ([facility])    • Longstanding persistent atrial fibrillation ([facility])    • Unspecified dementia with behavioral disturbance ([facility])    • Essential hypertension    • S/P CABG (coronary artery bypass graft)    • Chronic venous hypertension (idiopathic) with ulcer and inflammation of bilateral lower extremity ([facility])    • Coronary artery disease    • Vitamin D deficiency    • History of amputation of lesser toe ([facility])    • ANNETTE treated with BiPAP    • Diabetic neuropathy ([facility])    • Status post coronary artery stent placement      Plan:     Acute on chronic hypoxic and  hypercapnic respiratory failure, improving  Large right-sided pleural effusion  Acute on chronic HFrEF  -Multifactorial with underlying ANNETTE, and CHF with morbid obesity  -Imaging reports noted  -s/p right chest tube placement by pulmonology on 3/30, transudate fluid noted  -ultrafiltration per nephrology  -Patient does not make urine, Lasix discontinued  -Continue other home meds  -Weaned down to nasal cannula  -Pulmonology following, palliative care following, remains full code     ESRD on HD  -Ultrafiltration as tolerated  -Midodrine for blood pressure support  -Continue dialysis support per nephrology    Recent CVA  -Diagnosed during last admission, previous imaging reports and discharge summary noted  -Has some residual right-sided deficits  -Repeat CT head this admission is stable, no hemorrhage noted  -Per previous notes, plan was to start Eliquis for his A. fib if repeat CT is stable per neurology recommendations  -Started on Eliquis this admission    PAF  -Rate controlled currently, continue home meds  -Eliquis started  -Monitor on telemetry     DM2  Dysphagia  -Patient much more awake and alert, s/p VFSS, on diet per ST  -Basal insulin stopped due to hypoglycemia, resume when able  -Continue sliding scale, monitor blood glucose  -Adjust as needed    Hypothyroidism  -Continue home meds     Major depressive disorder, recurrent, mild   -Continue home meds, monitor     Obesity (BMI 30-39.9)  BMI 36.92  -Lifestyle modifications to reduce cardiovascular risk factors    DVT ppx  -Eliquis       CODE STATUS:    Code Status (Patient has no pulse and is not breathing): CPR (Attempt to Resuscitate)  Medical Interventions (Patient has pulse or is breathing): Full Support      Disposition: Rehab placement, needs pre-CERT    Electronically signed by Mari Bernal MD, 04/09/22, 21:02 EDT.  [facility] Hospitalist Team

## 2022-04-10 NOTE — PROGRESS NOTES
Name: Benson Mclean  Age: 71 y.o.  : 1950  Sex: male    04/10/22    Subjective  Resting comfortably no complaints     Interval History   No acute events overnight      Objective:    Vital Signs  Temp:  [97.6 °F (36.4 °C)-98.1 °F (36.7 °C)] 98 °F (36.7 °C)  Heart Rate:  [] 106  Resp:  [16-20] 17  BP: ()/(50-65) 106/61        Intake/Output Summary (Last 24 hours) at 4/10/2022 1428  Last data filed at 4/10/2022 1400  Gross per 24 hour   Intake 1080 ml   Output --   Net 1080 ml           Physical Exam  Physical Exam  Constitutional:       General: He is not in acute distress.     Appearance: He is well-developed. He is not diaphoretic.   HENT:      Head: Normocephalic and atraumatic.      Right Ear: External ear normal.      Left Ear: External ear normal.      Nose: Nose normal.      Mouth/Throat:      Pharynx: No oropharyngeal exudate.   Eyes:      General: No scleral icterus.     Conjunctiva/sclera: Conjunctivae normal.      Pupils: Pupils are equal, round, and reactive to light.   Neck:      Thyroid: No thyromegaly.      Vascular: No JVD.      Trachea: No tracheal deviation.   Cardiovascular:      Rate and Rhythm: Normal rate and regular rhythm.      Pulses: Normal pulses.      Heart sounds: Normal heart sounds, S1 normal and S2 normal. No murmur heard.   No systolic murmur is present.   No diastolic murmur is present.    No friction rub. No gallop. No S3 sounds.   Pulmonary:      Effort: Pulmonary effort is normal. No respiratory distress.      Breath sounds: Normal breath sounds. No stridor. No wheezing or rales.   Chest:      Chest wall: No tenderness.   Abdominal:      General: Bowel sounds are normal. There is no distension.      Palpations: Abdomen is soft. There is no mass.      Tenderness: There is no abdominal tenderness. There is no guarding or rebound.      Hernia: No hernia is present.   Genitourinary:     Penis: Normal. No phimosis, erythema or discharge.       Testes: Normal.    Musculoskeletal:         General: No tenderness or deformity. Normal range of motion.      Cervical back: Normal range of motion and neck supple.   Lymphadenopathy:      Cervical: No cervical adenopathy.   Skin:     General: Skin is warm and dry.      Coloration: Skin is not pale.      Findings: No erythema or rash.   Neurological:      Mental Status: He is alert and oriented to person, place, and time. He is not disoriented.      Cranial Nerves: No cranial nerve deficit.      Sensory: No sensory deficit.      Motor: No abnormal muscle tone.      Deep Tendon Reflexes: Reflexes normal.   Psychiatric:         Behavior: Behavior normal.         Judgment: Judgment normal.            Results Review:      Results from last 7 days   Lab Units 04/10/22  0446 04/09/22  0414 04/08/22  0454 04/07/22  0628 04/06/22  0437   SODIUM mmol/L 135* 134* 133* 136 136   CO2 mmol/L 21.0* 22.0 21.0* 23.0 22.0   BUN mg/dL 25* 18 29* 21 27*   CREATININE mg/dL 3.56* 2.90* 3.47* 3.07* 3.46*   CALCIUM mg/dL 8.3* 8.6 8.3* 8.2* 8.2*   EGFR mL/min/1.73 17.5* 22.4* 18.1* 20.9* 18.1*       Results from last 7 days   Lab Units 04/10/22  0446 04/09/22 0414 04/08/22  0517 04/07/22  0628 04/06/22  0437 04/05/22  0414 04/04/22  0449   WBC 10*3/mm3 11.00* 5.20 5.40 5.80 6.90 5.50 5.20       Imaging studies: I personally reviewed the patient's most recent pertinent imaging studies       Medication Review:   apixaban, 5 mg, Oral, Q12H  atorvastatin, 40 mg, Oral, Nightly  budesonide, 0.5 mg, Nebulization, BID  carbamide peroxide, 10 drop, Both Ears, BID  docusate sodium, 100 mg, Oral, Daily  donepezil, 10 mg, Oral, Nightly  guaiFENesin, 600 mg, Oral, BID  insulin lispro, 0-14 Units, Subcutaneous, 4x Daily With Meals & Nightly  ipratropium-albuterol, 3 mL, Nebulization, TID - RT  levothyroxine, 50 mcg, Oral, Q AM  metoprolol tartrate, 37.5 mg, Oral, BID  midodrine, 10 mg, Oral, TID AC  pantoprazole, 40 mg, Oral, QAM AC  primidone, 25 mg, Oral,  Daily  sertraline, 50 mg, Oral, Daily          Assessment  End-stage renal disease    Coronary artery disease status post CABG    Hypertension    Diabetes    History of CVA    Hypotension  On midodrine    History of CHF  EF of 25 to 30%      Plan:  Continue hemodialysis on a Monday Wednesday Friday basis    Further recommendations based on hosp course     Dinh Ceballos MD  04/10/22  14:28 EDT  Tel: [phone]  Fax: [phone]

## 2022-04-10 NOTE — PROGRESS NOTES
Daily Progress Note        Acute on chronic systolic congestive heart failure (HCC)    S/P CABG (coronary artery bypass graft)    Essential hypertension    Status post coronary artery stent placement    ANNETTE treated with BiPAP    Major depressive disorder, recurrent, mild (HCC)    Mixed hyperlipidemia    History of cerebrovascular accident    History of amputation of lesser toe (HCC)    Flaccid hemiplegia of right dominant side as late effect of cerebral infarction (HCC)    Diabetic neuropathy (HCC)    Unspecified dementia with behavioral disturbance (HCC)    Coronary artery disease    Obesity    Vitamin D deficiency    Chronic venous hypertension (idiopathic) with ulcer and inflammation of bilateral lower extremity (HCC)    Longstanding persistent atrial fibrillation (HCC)    Type 2 diabetes mellitus with hyperglycemia (HCC)    Anemia of renal disease    End stage renal disease (HCC)    Dependence on intermittent renal dialysis (HCC)    Congestive heart failure, unspecified HF chronicity, unspecified heart failure type (HCC)    Obesity (BMI 30-39.9)    Acute CVA (cerebrovascular accident) (HCC)    Hypothyroidism (acquired)    Vitamin B12 deficiency    Dyspnea, unspecified type      Assessment    Chronic large right pleural effusion, pleural fluid is transudate with predominant lymphocytes compatible with chronicity, low pH with normal glucose, cultures are negative    Chronic obstructive pulmonary disease  Obstructive sleep apnea, noncompliant with CPAP  Pulmonary hypertension    Acute on chronic systolic CHF- Elevated proBNP> 70,000.0  Last echo 3/13/2022  -EF 26-30% with severely decreased LV systolic function  -RVSP 50  -Right ventricular cavity is mild to moderately dilated  -Moderate left pleural effusion noted     End-stage renal disease-HD M/W/F    HTN  Type 2 diabetes  CAD-S/P CABG  Hyperlipidemia  Chronic A. fib  Hypothyroidism  Tremors  Depression  Dementia  Vitamin B12 and D  deficiencies  Obesity    History of cerebral hemorrhage-residual flaccid hemiplegia right side with vision loss  Cytokine release syndrome, grade 1     3/29/2022 respiratory panel was negative     Plan     Oxygen supplement and titration: Currently on 2 L per nasal cannula at night and room air during the day  Chest tube removed 4/8/2022    Hemodialysis, MWF  BP control, midodrine 10 mg 3 times a day     Pulmicort nebulizer 2 times a day  DuoNebs 3 times a day  Mucinex  Synthroid 50 mcg  Electrolyte/Glycemic control  Anticoagulation Eliquis  GI prophylaxis Protonix       4/5/2022                                                                 3/29/2022          LOS: 12 days     Subjective     Shortness of breath    Objective     Vital signs for last 24 hours:  Vitals:    04/10/22 0205 04/10/22 0614 04/10/22 0739 04/10/22 0742   BP:  115/58     BP Location:  Right arm     Patient Position:  Lying     Pulse: 99 115 114 114   Resp: 20 16 16 16   Temp:  98 °F (36.7 °C)     TempSrc:  Oral     SpO2: 99% 98% 100%    Weight:       Height:           Intake/Output last 3 shifts:  I/O last 3 completed shifts:  In: 720 [P.O.:720]  Out: 375 [Urine:375]  Intake/Output this shift:  No intake/output data recorded.      Radiology  Imaging Results (Last 24 Hours)     ** No results found for the last 24 hours. **          Labs:  Results from last 7 days   Lab Units 04/10/22  0446   WBC 10*3/mm3 11.00*   HEMOGLOBIN g/dL 11.1*   HEMATOCRIT % 33.9*   PLATELETS 10*3/mm3 219     Results from last 7 days   Lab Units 04/10/22  0446   SODIUM mmol/L 135*   POTASSIUM mmol/L 4.0   CHLORIDE mmol/L 99   CO2 mmol/L 21.0*   BUN mg/dL 25*   CREATININE mg/dL 3.56*   CALCIUM mg/dL 8.3*   GLUCOSE mg/dL 158*                                       Meds:   SCHEDULE  apixaban, 5 mg, Oral, Q12H  atorvastatin, 40 mg, Oral, Nightly  budesonide, 0.5 mg, Nebulization, BID  carbamide peroxide, 10 drop, Both Ears, BID  docusate sodium, 100 mg, Oral,  Daily  donepezil, 10 mg, Oral, Nightly  guaiFENesin, 600 mg, Oral, BID  insulin lispro, 0-14 Units, Subcutaneous, 4x Daily With Meals & Nightly  ipratropium-albuterol, 3 mL, Nebulization, TID - RT  levothyroxine, 50 mcg, Oral, Q AM  metoprolol tartrate, 37.5 mg, Oral, BID  midodrine, 10 mg, Oral, TID AC  pantoprazole, 40 mg, Oral, QAM AC  primidone, 25 mg, Oral, Daily  sertraline, 50 mg, Oral, Daily      Infusions     PRNs  •  acetaminophen  •  calcium carbonate  •  dextrose  •  dextrose  •  glucagon (human recombinant)  •  heparin (porcine)  •  insulin lispro **AND** insulin lispro  •  ipratropium-albuterol  •  [COMPLETED] Insert peripheral IV **AND** sodium chloride    Physical Exam:  Physical Exam  Vitals reviewed.   Pulmonary:      Breath sounds: Decreased breath sounds present.      Comments: Presence of chest tube  Musculoskeletal:         General: Swelling present.      Right lower leg: Edema present.      Left lower leg: Edema present.   Skin:     General: Skin is warm and dry.   Neurological:      Mental Status: He is alert.         ROS  Review of Systems   Respiratory: Positive for shortness of breath.    Cardiovascular: Positive for leg swelling.   Constitutional: Negative for chills, fever and malaise/fatigue.   HENT: Negative.    Eyes: Negative.      Skin: Negative.    Musculoskeletal: Negative.    Gastrointestinal: Negative.    Genitourinary: Negative.    Neurological: Chronic right-sided weakness  Psychiatric/Behavioral: Negative.    I reviewed the patient's new clinical results

## 2022-04-10 NOTE — PLAN OF CARE
Problem: Adult Inpatient Plan of Care  Goal: Plan of Care Review  Outcome: Ongoing, Progressing  Flowsheets  Taken 4/10/2022 1240 by Fabi Byrnes RN  Progress: no change  Taken 4/5/2022 1648 by Unique Florence RN  Plan of Care Reviewed With: patient  Goal: Patient-Specific Goal (Individualized)  Outcome: Ongoing, Progressing  Goal: Absence of Hospital-Acquired Illness or Injury  Outcome: Ongoing, Progressing  Intervention: Identify and Manage Fall Risk  Recent Flowsheet Documentation  Taken 4/10/2022 1215 by Fabi Byrnes RN  Safety Promotion/Fall Prevention: safety round/check completed  Taken 4/10/2022 1009 by Fabi Byrnes RN  Safety Promotion/Fall Prevention: safety round/check completed  Taken 4/10/2022 0730 by Fabi Byrnes RN  Safety Promotion/Fall Prevention:   assistive device/personal items within reach   clutter free environment maintained   fall prevention program maintained   nonskid shoes/slippers when out of bed   safety round/check completed  Intervention: Prevent Skin Injury  Recent Flowsheet Documentation  Taken 4/10/2022 1206 by Fabi Byrnes RN  Body Position:   turned   tilted   right  Taken 4/10/2022 0730 by Fabi Byrnes RN  Skin Protection: pulse oximeter probe site changed  Intervention: Prevent and Manage VTE (Venous Thromboembolism) Risk  Recent Flowsheet Documentation  Taken 4/10/2022 0730 by Fabi Byrnes RN  Activity Management: activity adjusted per tolerance  VTE Prevention/Management: dorsiflexion/plantar flexion performed  Intervention: Prevent Infection  Recent Flowsheet Documentation  Taken 4/10/2022 1215 by Fabi Byrnes RN  Infection Prevention: hand hygiene promoted  Taken 4/10/2022 1009 by Fabi Byrnes RN  Infection Prevention: hand hygiene promoted  Taken 4/10/2022 0730 by Fabi Byrnes RN  Infection Prevention: hand hygiene promoted  Goal: Optimal Comfort and Wellbeing  Outcome: Ongoing, Progressing  Intervention:  Provide Person-Centered Care  Recent Flowsheet Documentation  Taken 4/10/2022 0730 by Fabi Byrnes RN  Trust Relationship/Rapport: care explained  Goal: Readiness for Transition of Care  Outcome: Ongoing, Progressing     Problem: Fall Injury Risk  Goal: Absence of Fall and Fall-Related Injury  Outcome: Ongoing, Progressing  Intervention: Identify and Manage Contributors  Recent Flowsheet Documentation  Taken 4/10/2022 0730 by Fabi Byrnes RN  Medication Review/Management: medications reviewed  Intervention: Promote Injury-Free Environment  Recent Flowsheet Documentation  Taken 4/10/2022 1215 by Fabi Byrnes RN  Safety Promotion/Fall Prevention: safety round/check completed  Taken 4/10/2022 1009 by Fabi Byrnes RN  Safety Promotion/Fall Prevention: safety round/check completed  Taken 4/10/2022 0730 by Fabi Byrnes RN  Safety Promotion/Fall Prevention:   assistive device/personal items within reach   clutter free environment maintained   fall prevention program maintained   nonskid shoes/slippers when out of bed   safety round/check completed     Problem: COPD (Chronic Obstructive Pulmonary Disease) Comorbidity  Goal: Maintenance of COPD Symptom Control  Outcome: Ongoing, Progressing  Intervention: Maintain COPD-Symptom Control  Recent Flowsheet Documentation  Taken 4/10/2022 0730 by Fabi Byrnes RN  Medication Review/Management: medications reviewed     Problem: Diabetes Comorbidity  Goal: Blood Glucose Level Within Targeted Range  Outcome: Ongoing, Progressing  Intervention: Monitor and Manage Glycemia  Recent Flowsheet Documentation  Taken 4/10/2022 0730 by Fabi Byrnes RN  Glycemic Management: blood glucose monitored     Problem: Heart Failure Comorbidity  Goal: Maintenance of Heart Failure Symptom Control  Outcome: Ongoing, Progressing  Intervention: Maintain Heart Failure-Management  Recent Flowsheet Documentation  Taken 4/10/2022 0730 by Fabi Byrnes  RN  Medication Review/Management: medications reviewed     Problem: Hypertension Comorbidity  Goal: Blood Pressure in Desired Range  Outcome: Ongoing, Progressing  Intervention: Maintain Blood Pressure Management  Recent Flowsheet Documentation  Taken 4/10/2022 0730 by Fabi Byrnes RN  Medication Review/Management: medications reviewed     Problem: Pain Chronic (Persistent) (Comorbidity Management)  Goal: Acceptable Pain Control and Functional Ability  Outcome: Ongoing, Progressing  Intervention: Manage Persistent Pain  Recent Flowsheet Documentation  Taken 4/10/2022 0730 by Fabi Byrnes RN  Medication Review/Management: medications reviewed  Intervention: Optimize Psychosocial Wellbeing  Recent Flowsheet Documentation  Taken 4/10/2022 0730 by Fabi Byrnes RN  Diversional Activities: television  Family/Support System Care: support provided     Problem: Skin Injury Risk Increased  Goal: Skin Health and Integrity  Outcome: Ongoing, Progressing  Intervention: Promote and Optimize Oral Intake  Recent Flowsheet Documentation  Taken 4/10/2022 0730 by Fabi Byrnes RN  Oral Nutrition Promotion: rest periods promoted  Intervention: Optimize Skin Protection  Recent Flowsheet Documentation  Taken 4/10/2022 1206 by Fabi Byrnes RN  Head of Bed (HOB) Positioning: HOB at 30-45 degrees  Taken 4/10/2022 0730 by Fabi Byrnes RN  Pressure Reduction Techniques: weight shift assistance provided  Pressure Reduction Devices: specialty bed utilized  Skin Protection: pulse oximeter probe site changed   Goal Outcome Evaluation:   Pt rested throughout shift. No complaints of pain. Q2 turning. Pt remains in SR and 2L NC, will continue to observe.         Progress: no change

## 2022-04-10 NOTE — NURSING NOTE
Patient /o of ear hurting got him tylenol Called for [facility] to see paient they wer just here seeinf patient an he has a build of wax in his ears an they are going to take care of that.

## 2022-04-11 PROBLEM — R06.00 DYSPNEA, UNSPECIFIED TYPE: Status: RESOLVED | Noted: 2022-01-01 | Resolved: 2022-01-01

## 2022-04-11 NOTE — PLAN OF CARE
Goal Outcome Evaluation:  Plan of Care Reviewed With: patient        Progress: no change  Outcome Evaluation: SLP continues to follow for dysphagia interventions. Pt KULWANT in dialysis this date. Will attempt follow up for diet tolerance, re-evaluation, and exercises as pt available.

## 2022-04-11 NOTE — CASE MANAGEMENT/SOCIAL WORK
Continued Stay Note  DOREEN Amin     Patient Name: Benson Mclean  MRN: [id number]  Today's Date: 4/11/2022    Admit Date: 3/29/2022     Discharge Plan     Row Name 04/11/22 1303       Plan    Plan DC Plan: Accepted to [place] under Medicaid. From [facility] (spouse did not want return). No precert or PASRR required. OP HD MWF [facility].    Patient/Family in Agreement with Plan yes    Plan Comments CM met with patient's spouse, Melanie at her bedside to discuss IMM letter and dc plan. DC orders in at this time. Patient's spouse agreeable to dc plan. Notified her that patient does have private room accommodations at [place]. CM updated Ana from [facility] that patient is scheduled to [facility] today after HD and will be starting OP HD at [facility] on Wednesday, 4/13. Discussed with LSW patient's spouse's concerns regarding his social security check that she uses and assistance with disability. CM contacted [place] liaison to contact patient's spouse, Melanie regarding her 's social security and payment at his facility.               Expected Discharge Date and Time     Expected Discharge Date Expected Discharge Time    Apr 11, 2022         Met with patient's spouse in her hospital room wearing PPE: mask.      Maintained distance greater than six feet and spent less than 15 minutes in the room.      Kristina Gastelum RN     Office Phone: [phone]  Office Cell: [phone]

## 2022-04-11 NOTE — THERAPY TREATMENT NOTE
Subjective: Pt agreeable to therapeutic plan of care.    Objective:     Bed mobility - N/A or Not attempted.; did not attempt 2* hypotension in supine position.  Transfers - N/A or Not attempted.  Ambulation - 0 feet N/A or Not attempted.    Therapeutic exercises: AROM for BLEs (needed cga only for exercise) - hip flexion/extension; hip abd/add; hip IR/ER, knee flexion/extension; saq; ankle DF/PF.  All x 10 reps in supine.    Pain: 4 VAS  Education: Provided education on importance of mobility and skilled verbal / tactile cueing throughout intervention.     Assessment: Benson Mclean presents with functional mobility impairments which indicate the need for skilled intervention. Tolerating session today without incident. Will continue to follow and progress as tolerated.     Plan/Recommendations:   Pt would benefit from Skilled Rehab placement at discharge from facility and requires no DME at discharge.   Pt desires Skilled Rehab placement at discharge. Pt cooperative; agreeable to therapeutic recommendations and plan of care.     Basic Mobility 6-click:  Rollin = Total, A lot = 2, A little = 3; 4 = None  Supine>Sit:   1 = Total, A lot = 2, A little = 3; 4 = None   Sit>Stand with arms:  1 = Total, A lot = 2, A little = 3; 4 = None  Bed>Chair:   1 = Total, A lot = 2, A little = 3; 4 = None  Ambulate in room:  1 = Total, A lot = 2, A little = 3; 4 = None  3-5 Steps with railin = Total, A lot = 2, A little = 3; 4 = None  Score: 8    Modified Fredy: 5 = Severe disability (Requires constant nursing care and attention, bedridden, incontinent)    Post-Tx Position: Supine with HOB Elevated, Alarms activated and Call light and personal items within reach  PPE: gloves, surgical mask, eyewear protection

## 2022-04-11 NOTE — PLAN OF CARE
Goal Outcome Evaluation:         Assessment: Benson Mclean presents with functional mobility impairments which indicate the need for skilled intervention. Tolerating session today without incident. Will continue to follow and progress as tolerated.     Plan/Recommendations:   Pt would benefit from Skilled Rehab placement at discharge from facility and requires no DME at discharge.   Pt desires Skilled Rehab placement at discharge. Pt cooperative; agreeable to therapeutic recommendations and plan of care.

## 2022-04-11 NOTE — PROGRESS NOTES
[facility] Medicine Services Daily Progress Note    Patient Name: Benson Mclean  : 1950  MRN: [id number]  Primary Care Physician:  Rudolph Rooney MD  Date of admission: 3/29/2022      Subjective      Chief Complaint: Shortness of breath    4/10/2022  Patient seen and examined. He seems confused, does not remember who I am despite seeing him all week. He cannot tell me if his right ear pain is any better or not. Pt to return to [facility] today, now they don't have a bed for him anymore.      Objective      Vitals:   Temp:  [98 °F (36.7 °C)-98.1 °F (36.7 °C)] 98.1 °F (36.7 °C)  Heart Rate:  [] 113  Resp:  [15-20] 16  BP: ()/(46-65) 82/46  Flow (L/min):  [2] 2    Physical Exam:      General: Awake and alert, lying in bed, chronically ill-appearing, NAD  Eyes: PERRL, EOMI, conjunctive are clear  Cardiovascular: Regular rate and rhythm, no murmurs  Respiratory: Decreased breath sounds bilaterally, improving, no wheezing, unlabored breathing  Abdomen: Soft, obese, nontender, positive bowel sounds, no guarding  Neurologic: A&O, CN grossly intact, moves all extremities spontaneously  Musculoskeletal: Generalized weakness with right greater than left noted, no gross deformities  Skin: Warm, dry, intact       Result Review    Result Review:  I have personally reviewed the results from the time of this admission to 4/10/2022 21:20 EDT and agree with these findings:  [x]  Laboratory  [x]  Microbiology  [x]  Radiology  [x]  EKG/Telemetry   []  Cardiology/Vascular   []  Pathology  []  Old records  []  Other:    Wounds (last 24 hours)     LDA Wound     Row Name 04/10/22 1605 04/10/22 1215 04/10/22 0730       Wound 22 0340 perineum Pressure Injury    Wound - Properties Group Placement Date: 22  -AS Placement Time: 0340  -AS Present on Hospital Admission: Y  -AS Location: perineum  -AS Primary Wound Type: Pressure inj  -AS    Dressing Appearance -- -- open to air  -MA     Closure Open to air  -MA Open to air  -MA Open to air  -MA    Base pink  -MA pink  -MA pink  -MA    Periwound Temperature warm  -MA warm  -MA warm  -MA    Periwound Skin Turgor soft  -MA soft  -MA soft  -MA    Drainage Amount none  -MA none  -MA none  -MA    Dressing Care -- -- dressing reinforced  -MA    Retired Wound - Properties Group Placement Date: 03/30/22  -AS Placement Time: 0340  -AS Present on Hospital Admission: Y  -AS Location: perineum  -AS Primary Wound Type: Pressure inj  -AS    Retired Wound - Properties Group Date first assessed: 03/30/22  -AS Time first assessed: 0340  -AS Present on Hospital Admission: Y  -AS Location: perineum  -AS Primary Wound Type: Pressure inj  -AS       Wound 03/30/22 0341 Left posterior thigh    Wound - Properties Group Placement Date: 03/30/22  -AS Placement Time: 0341  -AS Present on Hospital Admission: Y  -AS Side: Left  -AS Orientation: posterior  -AS Location: thigh  -AS    Dressing Appearance -- -- open to air  -MA    Closure Open to air  -MA Open to air  -MA Open to air  -MA    Base scab  -MA scab  -MA scab  -MA    Drainage Amount none  -MA none  -MA none  -MA    Dressing Care -- -- open to air  -MA    Retired Wound - Properties Group Placement Date: 03/30/22  -AS Placement Time: 0341  -AS Present on Hospital Admission: Y  -AS Side: Left  -AS Orientation: posterior  -AS Location: thigh  -AS    Retired Wound - Properties Group Date first assessed: 03/30/22  -AS Time first assessed: 0341  -AS Present on Hospital Admission: Y  -AS Side: Left  -AS Location: thigh  -AS       Wound 03/30/22 0342 Right popliteal    Wound - Properties Group Placement Date: 03/30/22  -AS Placement Time: 0342  -AS Present on Hospital Admission: Y  -AS Side: Right  -AS Location: popliteal  -AS    Dressing Appearance -- -- open to air  -MA    Closure Open to air  -MA Open to air  -MA Open to air  -MA    Base scab;red  -MA scab;red  -MA scab;red  -MA    Drainage Amount none  -MA none  -MA none   -MA    Dressing Care -- -- open to air  -MA    Retired Wound - Properties Group Placement Date: 03/30/22  -AS Placement Time: 0342  -AS Present on Hospital Admission: Y  -AS Side: Right  -AS Location: popliteal  -AS    Retired Wound - Properties Group Date first assessed: 03/30/22  -AS Time first assessed: 0342  -AS Present on Hospital Admission: Y  -AS Side: Right  -AS Location: popliteal  -AS       Wound 03/30/22 0343 Left heel    Wound - Properties Group Placement Date: 03/30/22  -AS Placement Time: 0343  -AS Present on Hospital Admission: Y  -AS Side: Left  -AS Location: heel  -AS    Dressing Appearance -- -- open to air  -MA    Closure Open to air  -MA Open to air  -MA Open to air  -MA    Base red;blanchable  -MA red;blanchable  -MA red;blanchable  -MA    Drainage Amount none  -MA none  -MA none  -MA    Dressing Care -- -- open to air  -MA    Retired Wound - Properties Group Placement Date: 03/30/22  -AS Placement Time: 0343  -AS Present on Hospital Admission: Y  -AS Side: Left  -AS Location: heel  -AS    Retired Wound - Properties Group Date first assessed: 03/30/22  -AS Time first assessed: 0343  -AS Present on Hospital Admission: Y  -AS Side: Left  -AS Location: heel  -AS       Wound 03/30/22 0348 Right anterior ankle    Wound - Properties Group Placement Date: 03/30/22  -AS Placement Time: 0348  -AS Present on Hospital Admission: Y  -AS Side: Right  -AS Orientation: anterior  -AS Location: ankle  -AS    Dressing Appearance -- -- open to air  -MA    Closure Open to air  -MA Open to air  -MA Open to air  -MA    Base red;scab  -MA red;scab  -MA red;scab  -MA    Drainage Amount none  -MA none  -MA none  -MA    Dressing Care -- -- open to air  -MA    Retired Wound - Properties Group Placement Date: 03/30/22  -AS Placement Time: 0348  -AS Present on Hospital Admission: Y  -AS Side: Right  -AS Orientation: anterior  -AS Location: ankle  -AS    Retired Wound - Properties Group Date first assessed: 03/30/22  -AS  Time first assessed: 0348  -AS Present on Hospital Admission: Y  -AS Side: Right  -AS Location: ankle  -AS       Wound 03/30/22 0349 Left antecubital    Wound - Properties Group Placement Date: 03/30/22  -AS Placement Time: 0349 -AS Present on Hospital Admission: Y  -AS Side: Left  -AS Location: antecubital  -AS    Dressing Appearance -- -- open to air  -MA    Closure Open to air  -MA Open to air  -MA Open to air  -MA    Base scab;pink  -MA scab;pink  -MA scab;pink  -MA    Drainage Amount none  -MA none  -MA none  -MA    Dressing Care -- -- open to air  -MA    Retired Wound - Properties Group Placement Date: 03/30/22  -AS Placement Time: 0349  -AS Present on Hospital Admission: Y  -AS Side: Left  -AS Location: antecubital  -AS    Retired Wound - Properties Group Date first assessed: 03/30/22  -AS Time first assessed: 0349 -AS Present on Hospital Admission: Y  -AS Side: Left  -AS Location: antecubital  -AS       Wound 04/09/22 1201 Right posterior elbow Skin Tear    Wound - Properties Group Placement Date: 04/09/22 -AD Placement Time: 1201 -AD Present on Hospital Admission: N  -AD Side: Right  -AD Orientation: posterior  -AD Location: elbow  -AD Primary Wound Type: Skin tear  -AD    Dressing Appearance -- -- dry;intact;no drainage  -MA    Base bleeding  -MA bleeding  -MA bleeding  -MA    Periwound dry;intact  -MA dry;intact  -MA dry;intact  -MA    Dressing Care -- -- dressing reinforced  -MA    Retired Wound - Properties Group Placement Date: 04/09/22 -AD Placement Time: 1201 -AD Present on Hospital Admission: N  -AD Side: Right  -AD Orientation: posterior  -AD Location: elbow  -AD Primary Wound Type: Skin tear  -AD    Retired Wound - Properties Group Date first assessed: 04/09/22 -AD Time first assessed: 1201 -AD Present on Hospital Admission: N  -AD Side: Right  -AD Location: elbow  -AD Primary Wound Type: Skin tear  -AD    Row Name 04/10/22 0400 04/10/22 0000          Wound 03/30/22 0340 perineum Pressure  Injury    Wound - Properties Group Placement Date: 03/30/22  -AS Placement Time: 0340  -AS Present on Hospital Admission: Y  -AS Location: perineum  -AS Primary Wound Type: Pressure inj  -AS     Closure Open to air  -KK Open to air  -KK     Base pink  -KK pink  -KK     Periwound Temperature warm  -KK warm  -KK     Periwound Skin Turgor soft  -KK soft  -KK     Drainage Amount none  -KK none  -KK     Retired Wound - Properties Group Placement Date: 03/30/22  -AS Placement Time: 0340  -AS Present on Hospital Admission: Y  -AS Location: perineum  -AS Primary Wound Type: Pressure inj  -AS     Retired Wound - Properties Group Date first assessed: 03/30/22  -AS Time first assessed: 0340  -AS Present on Hospital Admission: Y  -AS Location: perineum  -AS Primary Wound Type: Pressure inj  -AS            Wound 03/30/22 0341 Left posterior thigh    Wound - Properties Group Placement Date: 03/30/22  -AS Placement Time: 0341  -AS Present on Hospital Admission: Y  -AS Side: Left  -AS Orientation: posterior  -AS Location: thigh  -AS     Closure Open to air  -KK Open to air  -KK     Base scab  -KK scab  -KK     Drainage Amount none  -KK none  -KK     Retired Wound - Properties Group Placement Date: 03/30/22  -AS Placement Time: 0341  -AS Present on Hospital Admission: Y  -AS Side: Left  -AS Orientation: posterior  -AS Location: thigh  -AS     Retired Wound - Properties Group Date first assessed: 03/30/22  -AS Time first assessed: 0341  -AS Present on Hospital Admission: Y  -AS Side: Left  -AS Location: thigh  -AS            Wound 03/30/22 0342 Right popliteal    Wound - Properties Group Placement Date: 03/30/22  -AS Placement Time: 0342  -AS Present on Hospital Admission: Y  -AS Side: Right  -AS Location: popliteal  -AS     Closure Open to air  -KK Open to air  -KK     Base scab;red  -KK scab;red  -KK     Drainage Amount none  -KK none  -KK     Retired Wound - Properties Group Placement Date: 03/30/22  -AS Placement Time: 0342  -AS  Present on Hospital Admission: Y  -AS Side: Right  -AS Location: popliteal  -AS     Retired Wound - Properties Group Date first assessed: 03/30/22  -AS Time first assessed: 0342  -AS Present on Hospital Admission: Y  -AS Side: Right  -AS Location: popliteal  -AS            Wound 03/30/22 0343 Left heel    Wound - Properties Group Placement Date: 03/30/22  -AS Placement Time: 0343  -AS Present on Hospital Admission: Y  -AS Side: Left  -AS Location: heel  -AS     Closure Open to air  -KK Open to air  -KK     Base red;blanchable  -KK red;blanchable  -KK     Drainage Amount none  -KK none  -KK     Retired Wound - Properties Group Placement Date: 03/30/22  -AS Placement Time: 0343  -AS Present on Hospital Admission: Y  -AS Side: Left  -AS Location: heel  -AS     Retired Wound - Properties Group Date first assessed: 03/30/22  -AS Time first assessed: 0343  -AS Present on Hospital Admission: Y  -AS Side: Left  -AS Location: heel  -AS            Wound 03/30/22 0348 Right anterior ankle    Wound - Properties Group Placement Date: 03/30/22  -AS Placement Time: 0348  -AS Present on Hospital Admission: Y  -AS Side: Right  -AS Orientation: anterior  -AS Location: ankle  -AS     Closure Open to air  -KK Open to air  -KK     Base red;scab  -KK red;scab  -KK     Drainage Amount none  -KK none  -KK     Retired Wound - Properties Group Placement Date: 03/30/22  -AS Placement Time: 0348  -AS Present on Hospital Admission: Y  -AS Side: Right  -AS Orientation: anterior  -AS Location: ankle  -AS     Retired Wound - Properties Group Date first assessed: 03/30/22  -AS Time first assessed: 0348  -AS Present on Hospital Admission: Y  -AS Side: Right  -AS Location: ankle  -AS            Wound 03/30/22 0349 Left antecubital    Wound - Properties Group Placement Date: 03/30/22  -AS Placement Time: 0349  -AS Present on Hospital Admission: Y  -AS Side: Left  -AS Location: antecubital  -AS     Closure Open to air  -KK Open to air  -KK     Base  scab;pink  -KK scab;pink  -KK     Drainage Amount none  -KK none  -KK     Retired Wound - Properties Group Placement Date: 03/30/22  -AS Placement Time: 0349  -AS Present on Hospital Admission: Y  -AS Side: Left  -AS Location: antecubital  -AS     Retired Wound - Properties Group Date first assessed: 03/30/22  -AS Time first assessed: 0349  -AS Present on Hospital Admission: Y  -AS Side: Left  -AS Location: antecubital  -AS            Wound 04/09/22 1201 Right posterior elbow Skin Tear    Wound - Properties Group Placement Date: 04/09/22  -AD Placement Time: 1201  -AD Present on Hospital Admission: N  -AD Side: Right  -AD Orientation: posterior  -AD Location: elbow  -AD Primary Wound Type: Skin tear  -AD     Base bleeding  -KK bleeding  -KK     Periwound dry;intact  -KK dry;intact  -KK     Retired Wound - Properties Group Placement Date: 04/09/22  -AD Placement Time: 1201  -AD Present on Hospital Admission: N  -AD Side: Right  -AD Orientation: posterior  -AD Location: elbow  -AD Primary Wound Type: Skin tear  -AD     Retired Wound - Properties Group Date first assessed: 04/09/22  -AD Time first assessed: 1201  -AD Present on Hospital Admission: N  -AD Side: Right  -AD Location: elbow  -AD Primary Wound Type: Skin tear  -AD           User Key  (r) = Recorded By, (t) = Taken By, (c) = Cosigned By    Initials Name Provider Type    Fabi Mejia RN Registered Nurse    Suad Sanchez RN Registered Nurse    AS Maricruz Mi RN Registered Nurse    Noy Torres RN Registered Nurse                  Assessment/Plan      Brief Patient Summary:  Benson Mclean is a 71 y.o. male with multiple medical issues including ESRD on HD Bvkjic-Sirjnqcct-Iuedyd, chronic systolic congestive heart failure, CAD s/p CABG, cardiac stent, chronic atrial fibrillation, CVA with residual right-sided weakness, COPD on chronic oxygen, ANNETTE on CPap, previous PE,  HTN, HLD, type 2 diabetes mellitus complicated by neuropathy,  anemia of renal disease, vitamin deficiencies, and dementia who presented to [facility] on 3/29/2022 complaining of shortness of breath.    Patient is poor historian but states he has been feeling shortness of breath for past few days.  Unknown of last dialysis.  Was recently here for CHF exacerbation and was diagnosed with ischemic CVA at the time with mild hemorrhagic conversion.  Was discharged to rehab and plan for restarting Eliquis if repeat CT head is stable per neurology recommendations.  CT of the head on admission is stable.  On admission patient severely hypoxic and placed on high flow oxygen.  Imaging report noted to have large right-sided pleural effusion, pulmonology and nephrology consulted.  Chest tube placed by pulmonology on 3/30.  Transudate fluid with Cultures negative so far.      apixaban, 5 mg, Oral, Q12H  atorvastatin, 40 mg, Oral, Nightly  budesonide, 0.5 mg, Nebulization, BID  carbamide peroxide, 10 drop, Both Ears, BID  docusate sodium, 100 mg, Oral, Daily  donepezil, 10 mg, Oral, Nightly  guaiFENesin, 600 mg, Oral, BID  insulin lispro, 0-14 Units, Subcutaneous, 4x Daily With Meals & Nightly  ipratropium-albuterol, 3 mL, Nebulization, TID - RT  levothyroxine, 50 mcg, Oral, Q AM  metoprolol tartrate, 37.5 mg, Oral, BID  midodrine, 10 mg, Oral, TID AC  pantoprazole, 40 mg, Oral, QAM AC  primidone, 25 mg, Oral, Daily  sertraline, 50 mg, Oral, Daily             Active Hospital Problems:  Active Hospital Problems    Diagnosis    • **Acute on chronic systolic congestive heart failure (HCC)    • Dyspnea, unspecified type    • Vitamin B12 deficiency    • Hypothyroidism (acquired)    • Acute CVA (cerebrovascular accident) (HCC)      Right occipital with residual vision loss     • Obesity (BMI 30-39.9)    • Congestive heart failure, unspecified HF chronicity, unspecified heart failure type (HCC)    • Dependence on intermittent renal dialysis (HCC)    • End stage renal disease (HCC)    •  Anemia of renal disease    • Type 2 diabetes mellitus with hyperglycemia (HCC)    • Mixed hyperlipidemia    • Obesity    • History of cerebrovascular accident    • Major depressive disorder, recurrent, mild (HCC)    • Flaccid hemiplegia of right dominant side as late effect of cerebral infarction (HCC)    • Longstanding persistent atrial fibrillation (HCC)    • Unspecified dementia with behavioral disturbance (HCC)    • Essential hypertension    • S/P CABG (coronary artery bypass graft)    • Chronic venous hypertension (idiopathic) with ulcer and inflammation of bilateral lower extremity ([facility])    • Coronary artery disease    • Vitamin D deficiency    • History of amputation of lesser toe (HCC)    • ANNETTE treated with BiPAP    • Diabetic neuropathy (HCC)    • Status post coronary artery stent placement      Plan:   Acute on chronic hypoxic and hypercapnic respiratory failure, improving  Large right-sided pleural effusion  Acute on chronic HFrEF  -Multifactorial with underlying ANNETTE, and CHF with morbid obesity    --noncompliant with Cpap  -Imaging reports noted  -s/p right chest tube placement by pulmonology on 3/30, transudate fluid noted  -ultrafiltration per nephrology  -Patient does not make urine, Lasix discontinued  -Continue other home meds  -Weaned down to nasal cannula  -Pulmonology following, palliative care following, remains full code     ESRD on HD  -Ultrafiltration as tolerated  -Midodrine for blood pressure support  --Nephrology following    --HD on MWF    --UF as needed    --Midodrine for BP support    Recent CVA  -Diagnosed during last admission, previous imaging reports and discharge summary noted  -Has some residual right-sided deficits  -Repeat CT head this admission is stable, no hemorrhage noted  -Per previous notes, plan was to start Eliquis for his A. fib if repeat CT is stable per neurology recommendations  -Started on Eliquis this admission    PAF  -Rate controlled currently, continue home  meds  -Eliquis started  -Monitor on telemetry     DM2  Dysphagia  -Patient much more awake and alert, s/p VFSS, on diet per ST  -Basal insulin stopped due to hypoglycemia, resume when able  -Continue sliding scale, monitor blood glucose  -Adjust as needed    Cerumen impaction  --left ear with a lot of cerumen/debris but ear drum can be seen  --Right ear completely obstructed ear canal, cannot see eardrum at all  --Pt started on Debrox to soften cerumen for removal,may need irrigation of his ears or use an ear wick to clean out the ear wax.    Hypothyroidism  -Continue home meds     Major depressive disorder, recurrent, mild   -Continue home meds, monitor     Obesity (BMI 30-39.9)  BMI 36.92  -Lifestyle modifications to reduce cardiovascular risk factors    DVT ppx  -Eliquis       CODE STATUS:    Code Status (Patient has no pulse and is not breathing): CPR (Attempt to Resuscitate)  Medical Interventions (Patient has pulse or is breathing): Full Support      Disposition: Rehab placement, needs pre-CERT    Electronically signed by Mari Bernal MD, 04/10/22, 21:20 EDT.  [facility] Hospitalist Team

## 2022-04-11 NOTE — PROGRESS NOTES
Daily Progress Note        Acute on chronic systolic congestive heart failure (HCC)    S/P CABG (coronary artery bypass graft)    Essential hypertension    Status post coronary artery stent placement    ANNETTE treated with BiPAP    Major depressive disorder, recurrent, mild (HCC)    Mixed hyperlipidemia    History of cerebrovascular accident    History of amputation of lesser toe (HCC)    Flaccid hemiplegia of right dominant side as late effect of cerebral infarction (HCC)    Diabetic neuropathy (HCC)    Unspecified dementia with behavioral disturbance (HCC)    Coronary artery disease    Obesity    Vitamin D deficiency    Chronic venous hypertension (idiopathic) with ulcer and inflammation of bilateral lower extremity (HCC)    Longstanding persistent atrial fibrillation (HCC)    Type 2 diabetes mellitus with hyperglycemia (HCC)    Anemia of renal disease    End stage renal disease (HCC)    Dependence on intermittent renal dialysis (HCC)    Congestive heart failure, unspecified HF chronicity, unspecified heart failure type (HCC)    Obesity (BMI 30-39.9)    Acute CVA (cerebrovascular accident) (HCC)    Hypothyroidism (acquired)    Vitamin B12 deficiency    Dyspnea, unspecified type      Assessment    Chronic large right pleural effusion, pleural fluid is transudate with predominant lymphocytes compatible with chronicity, low pH with normal glucose, cultures are negative: The effusion resolved after chest tube drainage    Chronic obstructive pulmonary disease  Obstructive sleep apnea, noncompliant with CPAP  Pulmonary hypertension    Acute on chronic systolic CHF- Elevated proBNP> 70,000.0  Last echo 3/13/2022  -EF 26-30% with severely decreased LV systolic function  -RVSP 50  -Right ventricular cavity is mild to moderately dilated  -Moderate left pleural effusion noted     End-stage renal disease-HD M/W/F    HTN  Type 2 diabetes  CAD-S/P CABG  Hyperlipidemia  Chronic A.  fib  Hypothyroidism  Tremors  Depression  Dementia  Vitamin B12 and D deficiencies  Obesity    History of cerebral hemorrhage-residual flaccid hemiplegia right side with vision loss  Cytokine release syndrome, grade 1     3/29/2022 respiratory panel was negative    Chest tube removed 4/8/2022  - Body fluid cultures negative         Plan     Oxygen supplement and titration: Currently on 2L per NC  Hemodialysis- MWF  midodrine 10 mg 3 times a day   Pulmicort nebulizer 2 times a day  DuoNebs 3 times a day  Mucinex  Synthroid 50 mcg  Electrolyte/Glycemic control  Anticoagulation- Eliquis  GI prophylaxis- Protonix       4/5/2022                                                                 3/29/2022          LOS: 13 days     Subjective     Shortness of breath    Objective     Vital signs for last 24 hours:  Vitals:    04/10/22 1916 04/10/22 2338 04/11/22 0100 04/11/22 0454   BP:  90/45 (!) 89/46 100/62   BP Location:    Right arm   Patient Position:    Lying   Pulse: 113 110 113 114   Resp: 16  16 18   Temp:   97.3 °F (36.3 °C) 98 °F (36.7 °C)   TempSrc:   Axillary Oral   SpO2: 97%  98% 100%   Weight:       Height:           Intake/Output last 3 shifts:  I/O last 3 completed shifts:  In: 1080 [P.O.:1080]  Out: -   Intake/Output this shift:  No intake/output data recorded.      Radiology  Imaging Results (Last 24 Hours)     ** No results found for the last 24 hours. **          Labs:  Results from last 7 days   Lab Units 04/11/22  0404   WBC 10*3/mm3 11.20*   HEMOGLOBIN g/dL 11.3*   HEMATOCRIT % 34.7*   PLATELETS 10*3/mm3 212     Results from last 7 days   Lab Units 04/11/22  0404   SODIUM mmol/L 132*   POTASSIUM mmol/L 4.4   CHLORIDE mmol/L 99   CO2 mmol/L 20.0*   BUN mg/dL 33*   CREATININE mg/dL 3.72*   CALCIUM mg/dL 8.1*   GLUCOSE mg/dL 128*                                       Meds:   SCHEDULE  apixaban, 5 mg, Oral, Q12H  atorvastatin, 40 mg, Oral, Nightly  budesonide, 0.5 mg, Nebulization, BID  carbamide peroxide,  10 drop, Both Ears, BID  docusate sodium, 100 mg, Oral, Daily  donepezil, 10 mg, Oral, Nightly  guaiFENesin, 600 mg, Oral, BID  insulin lispro, 0-14 Units, Subcutaneous, 4x Daily With Meals & Nightly  ipratropium-albuterol, 3 mL, Nebulization, TID - RT  levothyroxine, 50 mcg, Oral, Q AM  metoprolol tartrate, 37.5 mg, Oral, BID  midodrine, 10 mg, Oral, TID AC  pantoprazole, 40 mg, Oral, QAM AC  primidone, 25 mg, Oral, Daily  sertraline, 50 mg, Oral, Daily      Infusions     PRNs  •  acetaminophen  •  calcium carbonate  •  dextrose  •  dextrose  •  glucagon (human recombinant)  •  heparin (porcine)  •  insulin lispro **AND** insulin lispro  •  ipratropium-albuterol  •  [COMPLETED] Insert peripheral IV **AND** sodium chloride    Physical Exam:  Physical Exam  Vitals reviewed.   Pulmonary:      Breath sounds: Decreased breath sounds present.   Musculoskeletal:         General: Swelling present.      Right lower leg: Edema present.      Left lower leg: Edema present.   Skin:     General: Skin is warm and dry.   Neurological:      Mental Status: He is alert.         ROS  Constitutional: Negative for chills, fever and malaise/fatigue.   HENT: Negative.    Eyes: Negative.      Skin: Negative.    Musculoskeletal: Negative.    Gastrointestinal: Negative.    Genitourinary: Negative.    Neurological: Chronic right-sided weakness  Psychiatric/Behavioral: Negative.        I have reviewed current clinicals.     Electronically signed by FABIOLA Brady, 04/11/22, 8:24 AM EDT.     The NP scribed the note for me, I have examined the patient and reviewed and verified the above findings and and I formulated the assessment and plan as documented

## 2022-04-11 NOTE — PROGRESS NOTES
Nutrition Services    Patient Name: Benson Mclean  YOB: 1950  MRN: [id number]  Admission date: 3/29/2022    PPE Documentation        PPE Worn By Provider Did not enter room for this encounter   PPE Worn By Patient  N/A     PROGRESS NOTE      Encounter Information: Progress note to monitor for PO intakes.  Noted with plans to D/C today to [facility].         PO Diet: Diet Texture; Mechanical Ground; No Straws   PO Supplements: None ordered    PO Intake:  57% average po intakes since last RD review        Nutrition support orders: -   Nutrition support review: -       Labs (reviewed below): -hyponatremia  -elevated BUN/Cr  -hypocalcemia  -Hyperglycemia       GI Function:  Last BM 4/11 (today)       Nutrition Intervention: Continue current diet and encourage good po intakes.       Results from last 7 days   Lab Units 04/11/22  0404 04/10/22  0446 04/09/22  0414   SODIUM mmol/L 132* 135* 134*   POTASSIUM mmol/L 4.4 4.0 4.2   CHLORIDE mmol/L 99 99 99   CO2 mmol/L 20.0* 21.0* 22.0   BUN mg/dL 33* 25* 18   CREATININE mg/dL 3.72* 3.56* 2.90*   CALCIUM mg/dL 8.1* 8.3* 8.6   GLUCOSE mg/dL 128* 158* 167*     Results from last 7 days   Lab Units 04/11/22  0404   HEMOGLOBIN g/dL 11.3*   HEMATOCRIT % 34.7*     COVID19   Date Value Ref Range Status   03/29/2022 Not Detected Not Detected - Ref. Range Final     Lab Results   Component Value Date    HGBA1C 6.3 (H) 03/12/2022       RD to follow up per protocol.    Electronically signed by:  Promise Ramirez RD  04/11/22 11:00 EDT

## 2022-04-11 NOTE — DISCHARGE SUMMARY
[facility] Medicine Services  DISCHARGE SUMMARY    Patient Name: Benson Mclean  : 1950  MRN: [id number]    Date of Admission: 3/29/2022  Date of Discharge: 2022  Primary Care Physician: Rudolph Rooney MD      Presenting Problem:   Chronic diastolic CHF (congestive heart failure) (HCC) [I50.32]  Acute respiratory failure with hypoxia and hypercapnia (HCC) [J96.01, J96.02]  Dyspnea, unspecified type [R06.00]  Acute on chronic congestive heart failure, unspecified heart failure type (HCC) [I50.9]    Active and Resolved Hospital Problems:  Active Hospital Problems    Diagnosis POA   • **Acute CVA (cerebrovascular accident) (HCC) [I63.9] Yes     Priority: High   • Vitamin B12 deficiency [E53.8] Yes   • Hypothyroidism (acquired) [E03.9] Yes   • Obesity (BMI 30-39.9) [E66.9] Yes   • Congestive heart failure, unspecified HF chronicity, unspecified heart failure type (HCC) [I50.9] Yes   • Dependence on intermittent renal dialysis (HCC) [Z99.2] Not Applicable   • End stage renal disease (HCC) [N18.6] Yes   • Anemia of renal disease [N18.9, D63.1] Yes   • Acute on chronic systolic congestive heart failure (HCC) [I50.23] Yes   • Type 2 diabetes mellitus with hyperglycemia (HCC) [E11.65] Yes   • Mixed hyperlipidemia [E78.2] Yes   • History of cerebrovascular accident [Z86.73] Not Applicable   • Major depressive disorder, recurrent, mild (HCC) [F33.0] Yes   • Flaccid hemiplegia of right dominant side as late effect of cerebral infarction (HCC) [I69.351] Not Applicable   • Longstanding persistent atrial fibrillation (HCC) [I48.11] Yes   • Unspecified dementia with behavioral disturbance (HCC) [F03.91] Yes   • Essential hypertension [I10] Yes   • S/P CABG (coronary artery bypass graft) [Z95.1] Not Applicable   • Chronic venous hypertension (idiopathic) with ulcer and inflammation of bilateral lower extremity (HCC) [I87.333, L97.919, L97.929] Yes   • Coronary artery disease [I25.10]  Yes   • Vitamin D deficiency [E55.9] Yes   • History of amputation of lesser toe (HCC) [Z89.429] Not Applicable   • ANNETTE treated with BiPAP [G47.33] Yes   • Diabetic neuropathy (HCC) [E11.40] Yes   • Status post coronary artery stent placement [Z95.5] Not Applicable      Resolved Hospital Problems    Diagnosis POA   • Dyspnea, unspecified type [R06.00] Yes     Acute on chronic hypoxic and hypercapnic respiratory failure, improving  Large right-sided pleural effusion  Acute on chronic HFrEF secondary to ischemic cardiomyopathy  -Multifactorial with underlying ANNETTE, and CHF with morbid obesity    --noncompliant with Cpap  -s/p right chest tube placement by pulmonology on 3/30, transudate fluid noted  -chest tube subsequently removed  -ultrafiltration per nephrology  -Patient does not make urine, Lasix discontinued  -Weaned down to nasal cannula     ESRD on HD  -Ultrafiltration as tolerated  -Midodrine for blood pressure support  --Nephrology following    --HD on MWF    --UF as needed    --Midodrine for BP support      Recent CVA  -Diagnosed during last admission, previous imaging reports and discharge summary noted  -Has some residual right-sided deficits  -Repeat CT head this admission is stable, no hemorrhage noted  -Per previous notes, plan was to start Eliquis for his A. fib if repeat CT is stable per neurology recommendations  -Started on Eliquis this admission  -Continue atorvastatin     Coronary artery disease status post PCI and stent  Hyperlipidemia  Paroxysmal atrial fibrillation  -Rate controlled currently, continue home meds  -Continue Eliquis, atorvastatin, and metoprolol     DM type 2, chronic and controlled  -Recent hemoglobin A1c 6.3 on 3/12/2022  -Basal insulin stopped due to hypoglycemia    Dysphagia  -Patient much more awake and alert, s/p VFSS, on diet per ST     Cerumen impaction bilateral external auditory canal  --Pt prescribed Debrox     Hypothyroidism  -Continue home meds     Major depressive  disorder, recurrent, mild   Dementia  -Continue home meds     Obesity (BMI 30-39.9)  BMI 36.92  -Lifestyle modifications to reduce cardiovascular risk factors    Pressure injury wounds of the perineum and posterior thighs, present on admission  -Wound care ordered    Hospital Course     Hospital Course:  Benson Mclean is a 71 y.o. male with multiple medical issues including ESRD on HD Jvrhjc-Gaswxwcxg-Efxmoe, chronic systolic congestive heart failure, CAD s/p CABG, cardiac stent, chronic atrial fibrillation, CVA with residual right-sided weakness, COPD on chronic oxygen, ANNETTE on CPap, previous PE,  HTN, HLD, type 2 diabetes mellitus complicated by neuropathy, anemia of renal disease, vitamin deficiencies, and dementia who presented to [facility] on 3/29/2022 complaining of shortness of breath.    Patient is poor historian but states he has been feeling shortness of breath for past few days.  Unknown of last dialysis.  Was recently here for CHF exacerbation and was diagnosed with ischemic CVA at the time with mild hemorrhagic conversion.  Was discharged to rehab and plan for restarting Eliquis if repeat CT head is stable per neurology recommendations.  CT of the head on admission is stable.  On admission patient was severely hypoxic and placed on high flow oxygen. Imaging report noted to have large right-sided pleural effusion.  Pulmonology and nephrology consulted.   Chest tube placed by pulmonology on 3/30/2022 with transudative fluid drained.  Pleural fluid culture was negative.  Chest tube was subsequently removed. Nephrology had the patient continue Monday Wednesday and Friday hemodialysis with ultrafiltration as tolerated.  He required midodrine for blood pressure support.  The patient is now felt to be stable for discharge back to rehab.      Day of Discharge     Vital Signs:  Temp:  [97.3 °F (36.3 °C)-98.1 °F (36.7 °C)] 98 °F (36.7 °C)  Heart Rate:  [106-114] 113  Resp:  [15-18] 16  BP:  ()/(45-62) 100/62  Flow (L/min):  [1-2] 1    Physical Exam:  Physical Exam   Vital signs and nurses notes reviewed.  The patient is pleasantly confused.  Well-developed overnourished gentleman awake and alert no acute distress; sclera anicteric, mucous membranes moist; lungs with soft rhonchi scattered anteriorly and posteriorly bilaterally; CV regular rate and rhythm; abdomen soft, nontender nondistended; extremities with chronic appearing bilateral right and left lower extremity edema.  Media images of wounds reviewed.      Pertinent  and/or Most Recent Results     LAB RESULTS:      Lab 04/11/22  0404 04/10/22  0446 04/09/22  0414 04/08/22  0517 04/07/22  0628 04/06/22  0437   WBC 11.20* 11.00* 5.20 5.40 5.80 6.90   HEMOGLOBIN 11.3* 11.1* 11.4* 11.7* 11.7* 10.5*   HEMATOCRIT 34.7* 33.9* 35.1* 35.4* 35.7* 32.4*   PLATELETS 212 219 213 200 184 189   NEUTROS ABS 8.90* 9.00* 3.00 3.20 3.65 4.50   LYMPHS ABS 1.40 1.00 1.30 1.40  --  1.40   MONOS ABS 0.70 0.50 0.60 0.50  --  0.70   EOS ABS 0.20 0.20 0.20 0.20 0.35 0.20   MCV 93.5 93.8 93.0 91.6 91.4 92.7         Lab 04/11/22  0404 04/10/22  0446 04/09/22  0414 04/08/22  0454 04/07/22  0628   SODIUM 132* 135* 134* 133* 136   POTASSIUM 4.4 4.0 4.2 5.1 4.8   CHLORIDE 99 99 99 98 100   CO2 20.0* 21.0* 22.0 21.0* 23.0   ANION GAP 13.0 15.0 13.0 14.0 13.0   BUN 33* 25* 18 29* 21   CREATININE 3.72* 3.56* 2.90* 3.47* 3.07*   EGFR 16.6* 17.5* 22.4* 18.1* 20.9*   GLUCOSE 128* 158* 167* 146* 149*   CALCIUM 8.1* 8.3* 8.6 8.3* 8.2*                         Brief Urine Lab Results  (Last result in the past 365 days)      Color   Clarity   Blood   Leuk Est   Nitrite   Protein   CREAT   Urine HCG        02/24/22 1043 Christine   Cloudy  Comment: Result checked    Small (1+)   Large (3+)   Positive   100 mg/dL (2+)               Microbiology Results (last 10 days)     ** No results found for the last 240 hours. **          CT Head Without Contrast    Result Date: 3/29/2022  Impression:   1. Volume loss secondary to cerebral atrophy. 2. Chronic ischemic changes. 3. No acute findings.  Electronically Signed By-Salinas Chinchilla MD On:3/29/2022 10:40 PM This report was finalized on [id number] by  Salinas Chinchilla MD.    CT Head Without Contrast    Result Date: 3/18/2022  Impression: 1. Evolving subacute infarct changes in the right occipital lobe. 2. Remote infarct changes in the left occipital lobe and right cerebellum. 3. Generalized volume loss and chronic microvascular ischemic changes. Arteriosclerosis. 4. No acute hemorrhage or shift. No hydrocephalus. Electronically signed by:  Vinh Pedroza M.D.  3/18/2022 1:40 AM    CT Head Without Contrast    Result Date: 3/16/2022  Impression:  1. Approximate 5 x 3 cm wedge-shaped hypodense region in the right occipital lobe concerning for an acute right PCA territory infarct. This is a new finding since 03/14/2022. No acute hemorrhages, mass effect or midline shift. 2. Otherwise no change in generalized parenchymal volume loss evidence of an old left infarct and chronic microvascular ischemia. Findings were called to Ashok, the patient's nurse on 2A at 8:38 PM on 03/16/2022.  Electronically Signed By-Leonard Wilcox DO On:3/16/2022 8:44 PM This report was finalized on [id number] by  Leonard Wilcox DO.    MRI Angiogram Head Without Contrast    Result Date: 3/18/2022  Impression:  1. Limited study due to motion artifact. 2. There is no large vessel occlusion. It is difficult to exclude a focal stenosis, a small cerebral thrombus, or a small saccular aneurysm.   Electronically Signed By-Salinas Chinchlila MD On:3/18/2022 8:17 AM This report was finalized on [id number] by  Salinas Chinchilla MD.    MRI Brain Without Contrast    Addendum Date: 3/17/2022    Report was called to floor at time of dictation.  Electronically Signed By-Ayden Hagen MD On:3/17/2022 10:57 AM This report was finalized on [id number] by  Ayden Hagen MD.    Result Date: 3/17/2022  Impression:  1.Evidence for acute infarct within the right occipital lobe which has occurred since prior study. There is some early hemorrhagic transformation suggested. 2.Old insult left occipital lobe. 3.Periventricular white matter changes which could reflect more chronic small vessel ischemic change. There also is evidence for old ischemia in the cerebellar hemispheres.  Electronically Signed By-Ayden Hagen MD On:3/17/2022 10:32 AM This report was finalized on [id number] by  Ayden Hagen MD.    MRI Brain Without Contrast    Result Date: 3/14/2022  Impression:  Tiny 5 mm focus of signal change along the periphery of the known remote left occipital infarct favored to be artifactual although a tiny solitary focus of acute ischemia cannot be entirely excluded. No evidence of acute ischemic infarct elsewhere.  Motion limited examination. No definitive areas of intracranial hemorrhage although recommend further evaluation with a noncontrast CT of the head.  Large amount of diffuse signal change of the brain consistent with chronic vascular ischemia.   Electronically Signed By-Zaire Irving On:3/14/2022 4:05 PM This report was finalized on [id number] by  Zaire Irving, .    FL Video Swallow With Speech Single Contrast    Result Date: 4/5/2022  Impression: 1.Aspiration with thin liquid, which was improved with chin tuck maneuver. 2.No penetration or aspiration identified with other consistencies. 3.Please refer to speech pathologist report for further details.  Electronically Signed By-Yony Flores MD On:4/5/2022 1:25 PM This report was finalized on [id number] by  Yony Flores MD.    FL Video Swallow With Speech Single Contrast    Result Date: 4/1/2022  Impression: Technically successful modified barium swallow  Electronically Signed By-Davis Dee MD On:4/1/2022 2:50 PM This report was finalized on [id number] by  Davis Dee MD.    XR Chest 1 View    Result Date: 4/8/2022  Impression: Interval removal of  right-sided chest tube. No pneumothorax identified. There is a mild pulmonary edema pattern with a small left-sided pleural effusion with probable overlying atelectasis.  Electronically Signed By-Angelica Neves MD On:4/8/2022 2:28 PM This report was finalized on [id number] by  Angelica Neves MD.    XR Chest 1 View    Result Date: 4/8/2022  Impression: Small right-sided pneumothorax, new as compared to the previous study. Similar appearing right-sided chest tube. Probable mild improvement in left basilar airspace disease and left-sided pleural effusion.  Electronically Signed By-Angelica Neves MD On:4/8/2022 8:21 AM This report was finalized on [id number] by  Angelica Neves MD.    XR Chest 1 View    Result Date: 4/7/2022  Impression: 1. Smallbore chest tube in expected position. Small 7 mm medial right-sided chest tubes present. 2. Cardiomegaly with prominent interstitial markings likely related to interstitial edema pattern and small left-sided pleural effusion.  Electronically Signed By-Anselmo Vaz MD On:4/7/2022 8:26 AM This report was finalized on [id number] by  Anselmo Vaz MD.    XR Chest 1 View    Result Date: 4/6/2022  Impression: Suspect mild improvement in left pleural effusion and basilar airspace disease, likely atelectasis. No new suspicious abnormality  Electronically Signed By-Norman Paige On:4/6/2022 7:52 AM This report was finalized on [id number] by  Norman Paige, .    XR Chest 1 View    Result Date: 4/5/2022  Impression: Stable chest, demonstrating cardiomegaly with stable left pleural effusion and left lower lobe airspace disease likely atelectasis, with atelectasis in the right lung base  Electronically Signed By-Norman Paige On:4/5/2022 8:26 AM This report was finalized on [id number] by  Norman Paige, .    XR Chest 1 View    Result Date: 4/4/2022  Impression: Stable chest. There is stable left pleural effusion with left lower lobe airspace disease which is likely  atelectasis. There is stable strandy opacity in the right lung base compatible with atelectasis  Electronically Signed By-Norman Piage On:4/4/2022 8:24 AM This report was finalized on [id number] by  Norman Paige, .    XR Chest 1 View    Result Date: 4/3/2022  Impression:  New right lower lobe disease. This may be due to atelectasis, pneumonia or developing edema.  Stable chest elsewhere. Unchanged left pleural effusion and left basilar atelectasis.  Right-sided chest tube is unchanged. No pneumothorax.  Electronically Signed ByShukri Irving On:4/3/2022 9:09 AM This report was finalized on [id number] by  Zaire Irving .    XR Chest 1 View    Result Date: 4/2/2022  Impression:  Stable chest over the past 24 hours. Unchanged left pleural effusion with adjacent atelectasis.  Right-sided chest tube is unchanged with pigtail in the mid right thorax. No pneumothorax.  Electronically Signed ByShukri Irving On:4/2/2022 9:44 AM This report was finalized on [id number] by  Zaire Irving .    XR Chest 1 View    Result Date: 4/1/2022  Impression:  Stable chest over the past 24 hours.  Unchanged left pleural effusion with adjacent atelectasis.  No evidence of acute process of the right thorax with chest tube in place. No pneumothorax.  Electronically Signed ByShukri Irving On:4/1/2022 7:16 AM This report was finalized on [id number] by  Zaire Irving, .    XR Chest 1 View    Result Date: 3/31/2022  Impression: Stable small left pleural effusion and interstitial disease in the mid and lower lungs.  Electronically Signed By-Rigoberto Hill MD On:3/31/2022 7:28 AM This report was finalized on [id number] by  Rigoberto Hill MD.    XR Chest 1 View    Result Date: 3/30/2022  Impression:  Improved aeration of the right lung with presumed drainage of right-sided pleural fluid, no significant pleural fluid at this time. No pneumothorax with a right-sided chest tube in good position.  Unchanged left pleural effusion with  adjacent atelectasis, less likely pneumonia.  Right IJ dialysis catheter in good position.  Electronically Signed By-Zaire Irving On:3/30/2022 3:46 PM This report was finalized on [id number] by  Zaire Irving, .    XR Chest 1 View    Result Date: 3/30/2022  Impression: 1.The right lower chest is not entirely included, and a chest tube is not visualized. If a chest tube was placed, recommend repeat imaging to include the entire lower chest. 2. Stable appearance of diffuse bilateral airspace opacities with obscuration of each hemidiaphragm, likely due to bilateral pleural effusions and pulmonary edema. 3.No pneumothorax visible on this semierect exam.  Electronically Signed By-Samantha Navarro MD On:3/30/2022 1:41 PM This report was finalized on [id number] by  Samantha Navarro MD.    XR Chest 1 View    Result Date: 3/29/2022  Impression:  1. Increase in size of the moderate to large right pleural effusion with compressive atelectasis and possible underlying airspace disease. 2. No interval change in the small left pleural effusion with atelectasis versus airspace disease.  Electronically Signed By-Salinas Chinchilla MD On:3/29/2022 9:23 PM This report was finalized on [id number] by  Salinas Chinchilla MD.    XR Chest 1 View    Result Date: 3/12/2022  Impression: Impression: Overall similar appearance of the chest compared to the prior exam with bilateral pleural fluid collections, low lung volumes, and large cardiac silhouette. Hazy opacities in both lower lung zones could be due to atelectasis or pneumonia. Follow-up images recommended to ensure clearance. Electronically signed by:  Jacques Otto M.D.  3/12/2022 8:17 PM      Results for orders placed during the hospital encounter of 03/12/22    Duplex Carotid Ultrasound CAR    Interpretation Summary  · Proximal right internal carotid artery moderate stenosis.  · Proximal left internal carotid artery moderate stenosis.      Results for orders placed during the hospital  encounter of 03/12/22    Duplex Carotid Ultrasound CAR    Interpretation Summary  · Proximal right internal carotid artery moderate stenosis.  · Proximal left internal carotid artery moderate stenosis.      Results for orders placed during the hospital encounter of 03/12/22    Adult Transthoracic Echo Complete w/ Color, Spectral and Contrast if necessary per protocol    Interpretation Summary  · Left ventricular ejection fraction appears to be 26 - 30%. Left ventricular systolic function is severely decreased.  · Estimated right ventricular systolic pressure from tricuspid regurgitation is moderately elevated (45-55 mmHg). Calculated right ventricular systolic pressure from tricuspid regurgitation is 50 mmHg.  · The right ventricular cavity is mild to moderately dilated.  · There is a moderate sized left pleural effusion.      Labs Pending at Discharge:  Pending Labs     Order Current Status    AFB Culture - Body Fluid, Pleural Cavity Preliminary result          Procedures Performed           Consults:   Consults     Date and Time Order Name Status Description    3/30/2022  9:53 AM Inpatient Palliative Care MD Consult Completed     3/30/2022  7:38 AM Inpatient Pulmonology Consult Completed     3/30/2022 12:00 AM Inpatient Nephrology Consult Completed     3/29/2022 11:09 PM Hospitalist (on-call MD unless specified)      3/16/2022  8:48 PM Inpatient Neurology Consult Stroke Completed     3/12/2022 11:35 PM Inpatient Cardiology Consult Completed     3/12/2022 11:35 PM Inpatient Nephrology Consult Completed     3/7/2022  5:09 PM Inpatient Nephrology Consult Completed             Discharge Details        Discharge Medications      New Medications      Instructions Start Date   apixaban 5 MG tablet tablet  Commonly known as: ELIQUIS   5 mg, Oral, Every 12 Hours Scheduled      ipratropium-albuterol 0.5-2.5 mg/3 ml nebulizer  Commonly known as: DUO-NEB   3 mL, Nebulization, 3 Times Daily - RT      ipratropium-albuterol  0.5-2.5 mg/3 ml nebulizer  Commonly known as: DUO-NEB   3 mL, Nebulization, Every 4 Hours PRN         Continue These Medications      Instructions Start Date   albuterol sulfate  (90 Base) MCG/ACT inhaler  Commonly known as: PROVENTIL HFA;VENTOLIN HFA;PROAIR HFA   2 puffs, Inhalation, Every 4 Hours - RT      atorvastatin 40 MG tablet  Commonly known as: LIPITOR   40 mg, Oral, Nightly      bisacodyl 10 MG suppository  Commonly known as: DULCOLAX   10 mg, Rectal, Daily PRN      budesonide 0.5 MG/2ML nebulizer solution  Commonly known as: Pulmicort   0.5 mg, Nebulization, 2 Times Daily      cholecalciferol 25 MCG (1000 UT) tablet  Commonly known as: VITAMIN D3   1,000 Units, Oral, Daily      docusate sodium 100 MG capsule  Commonly known as: COLACE   100 mg, Oral, Daily      donepezil 10 MG tablet  Commonly known as: ARICEPT   10 mg, Oral, Nightly      fluticasone 50 MCG/ACT nasal spray  Commonly known as: FLONASE   2 sprays, Each Nare, 2 Times Daily      guaiFENesin 600 MG 12 hr tablet  Commonly known as: MUCINEX   600 mg, Oral, 2 Times Daily      levothyroxine 50 MCG tablet  Commonly known as: SYNTHROID, LEVOTHROID   50 mcg, Oral, Every Early Morning      Metoprolol Tartrate 37.5 MG tablet   37.5 mg, Oral, 2 Times Daily, Hold for SBP <110 or HR < 60      midodrine 10 MG tablet  Commonly known as: PROAMATINE   10 mg, Oral, 3 Times Daily Before Meals, Hold for BP > 140/90      omeprazole 20 MG capsule  Commonly known as: priLOSEC   20 mg, Oral, Daily      Preparation H 0.25-14-74.9 % ointment hemorrhoidal ointment  Generic drug: phenylephrine-mineral oil-petrolatum   1 application, Rectal, Daily PRN      primidone 50 MG tablet  Commonly known as: MYSOLINE   25 mg, Oral, Daily      sertraline 50 MG tablet  Commonly known as: ZOLOFT   50 mg, Oral, Daily      vitamin B-12 1000 MCG tablet  Commonly known as: CYANOCOBALAMIN   1,000 mcg, Oral, Daily         Stop These Medications    aspirin 81 MG EC tablet     Baqsimi  One Pack 3 MG/DOSE powder  Generic drug: Glucagon     gabapentin 100 MG capsule  Commonly known as: NEURONTIN     HYDROcodone-acetaminophen 7.5-325 MG per tablet  Commonly known as: NORCO     insulin NPH-insulin regular (70-30) 100 UNIT/ML injection  Commonly known as: humuLIN 70/30,novoLIN 70/30     magnesium hydroxide 400 MG/5ML suspension  Commonly known as: MILK OF MAGNESIA     melatonin 3 MG tablet     Nitroglycerin 400 MCG pack     ondansetron ODT 4 MG disintegrating tablet  Commonly known as: ZOFRAN-ODT            Allergies   Allergen Reactions   • Codeine Itching         Discharge Disposition:   Rehab Facility or Unit (DC - External)    Diet:  Hospital:  Diet Order   Procedures   • Diet Texture; Mechanical Ground; No Straws         Discharge Activity:   Activity Instructions     Activity as Tolerated              CODE STATUS:  Code Status and Medical Interventions:   Ordered at: 03/29/22 5729     Code Status (Patient has no pulse and is not breathing):    CPR (Attempt to Resuscitate)     Medical Interventions (Patient has pulse or is breathing):    Full Support         Future Appointments   Date Time Provider Department Center   5/3/2022  9:15 AM [facility]  [facility]   5/3/2022 10:00 AM ROOM 1,  [facility]  [facility] None       Additional Instructions for the Follow-ups that You Need to Schedule     Call MD With Problems / Concerns   As directed      Instructions: Call [phone] or email [email or web address] for problems or concerns.    Order Comments: Instructions: Call [phone] or email [email or web address] for problems or concerns.                Time spent on Discharge including face to face service:  45 minutes    This patient has been examined wearing appropriate Personal Protective Equipment and discussed with hospital infection control department. 04/11/22      Signature: Electronically signed by Kay Car MD, 04/11/22, 10:51 AM EDT.

## 2022-04-11 NOTE — PLAN OF CARE
Assessment: Benson Mclean presents with ADL impairments below baseline abilities which indicate the need for continued skilled intervention while inpatient. Hypotension prevented bed mobility and transfers to sit EOB this date. UE exercises and grooming completed in bed.  Tolerating session today without incident. Will continue to follow and progress as tolerated.     Plan/Recommendations:   Pt would benefit from Skilled Rehab placement at discharge from facility.   Pt desires Skilled Rehab placement at discharge. Pt cooperative; agreeable to therapeutic recommendations and plan of care.

## 2022-04-11 NOTE — PROGRESS NOTES
"RENAL/KCC:     LOS: 13 days    Patient Care Team:  Rudolph Rooney MD as PCP - General (Family Medicine)  Samantha Zabala APRN as PCP - Family Medicine  Jose G Rm MD as Consulting Physician (Cardiology)    Chief Complaint:  ESRD    Subjective     Interval History:   Chart reviewed.  For HD today.    Objective     Vital Sign Min/Max for last 24 hours  Temp  Min: 97.3 °F (36.3 °C)  Max: 98.1 °F (36.7 °C)   BP  Min: 82/46  Max: 110/65   Pulse  Min: 106  Max: 116   Resp  Min: 15  Max: 18   SpO2  Min: 97 %  Max: 100 %   Flow (L/min)  Min: 2  Max: 2   No data recorded     Flowsheet Rows    Flowsheet Row First Filed Value   Admission Height 177.8 cm (70\") Documented at 03/29/2022 2047   Admission Weight 117 kg (257 lb) Documented at 03/29/2022 2047          I/O this shift:  In: 120 [P.O.:120]  Out: -   I/O last 3 completed shifts:  In: 1080 [P.O.:1080]  Out: -     Physical Exam:  GEN: Awake, NAD  ENT: PERRL, EOMI, MMM  NECK: Supple, no JVD  CHEST: Coarse bilaterally  CV: RRR, no M/G/R  ABD: Soft, NT, +BS  SKIN: Warm and Dry  NEURO: CN's intact      WBC WBC   Date Value Ref Range Status   04/11/2022 11.20 (H) 3.40 - 10.80 10*3/mm3 Final   04/10/2022 11.00 (H) 3.40 - 10.80 10*3/mm3 Final   04/09/2022 5.20 3.40 - 10.80 10*3/mm3 Final      HGB Hemoglobin   Date Value Ref Range Status   04/11/2022 11.3 (L) 13.0 - 17.7 g/dL Final   04/10/2022 11.1 (L) 13.0 - 17.7 g/dL Final   04/09/2022 11.4 (L) 13.0 - 17.7 g/dL Final      HCT Hematocrit   Date Value Ref Range Status   04/11/2022 34.7 (L) 37.5 - 51.0 % Final   04/10/2022 33.9 (L) 37.5 - 51.0 % Final   04/09/2022 35.1 (L) 37.5 - 51.0 % Final      Platlets No results found for: LABPLAT   MCV MCV   Date Value Ref Range Status   04/11/2022 93.5 79.0 - 97.0 fL Final   04/10/2022 93.8 79.0 - 97.0 fL Final   04/09/2022 93.0 79.0 - 97.0 fL Final          Sodium Sodium   Date Value Ref Range Status   04/11/2022 132 (L) 136 - 145 mmol/L Final   04/10/2022 135 (L) 136 - 145 mmol/L " Final   04/09/2022 134 (L) 136 - 145 mmol/L Final      Potassium Potassium   Date Value Ref Range Status   04/11/2022 4.4 3.5 - 5.2 mmol/L Final   04/10/2022 4.0 3.5 - 5.2 mmol/L Final   04/09/2022 4.2 3.5 - 5.2 mmol/L Final      Chloride Chloride   Date Value Ref Range Status   04/11/2022 99 98 - 107 mmol/L Final   04/10/2022 99 98 - 107 mmol/L Final   04/09/2022 99 98 - 107 mmol/L Final      CO2 CO2   Date Value Ref Range Status   04/11/2022 20.0 (L) 22.0 - 29.0 mmol/L Final   04/10/2022 21.0 (L) 22.0 - 29.0 mmol/L Final   04/09/2022 22.0 22.0 - 29.0 mmol/L Final      BUN BUN   Date Value Ref Range Status   04/11/2022 33 (H) 8 - 23 mg/dL Final   04/10/2022 25 (H) 8 - 23 mg/dL Final   04/09/2022 18 8 - 23 mg/dL Final      Creatinine Creatinine   Date Value Ref Range Status   04/11/2022 3.72 (H) 0.76 - 1.27 mg/dL Final   04/10/2022 3.56 (H) 0.76 - 1.27 mg/dL Final   04/09/2022 2.90 (H) 0.76 - 1.27 mg/dL Final      Calcium Calcium   Date Value Ref Range Status   04/11/2022 8.1 (L) 8.6 - 10.5 mg/dL Final   04/10/2022 8.3 (L) 8.6 - 10.5 mg/dL Final   04/09/2022 8.6 8.6 - 10.5 mg/dL Final      PO4 No results found for: CAPO4   Albumin No results found for: ALBUMIN   Magnesium No results found for: MG   Uric Acid No results found for: URICACID        Results Review:     I reviewed the patient's new clinical results.    apixaban, 5 mg, Oral, Q12H  atorvastatin, 40 mg, Oral, Nightly  budesonide, 0.5 mg, Nebulization, BID  carbamide peroxide, 10 drop, Both Ears, BID  docusate sodium, 100 mg, Oral, Daily  donepezil, 10 mg, Oral, Nightly  guaiFENesin, 600 mg, Oral, BID  insulin lispro, 0-14 Units, Subcutaneous, 4x Daily With Meals & Nightly  ipratropium-albuterol, 3 mL, Nebulization, TID - RT  levothyroxine, 50 mcg, Oral, Q AM  metoprolol tartrate, 37.5 mg, Oral, BID  midodrine, 10 mg, Oral, TID AC  pantoprazole, 40 mg, Oral, QAM AC  primidone, 25 mg, Oral, Daily  sertraline, 50 mg, Oral, Daily           Medication Review:  Reviewed    Assessment/Plan       Acute on chronic systolic congestive heart failure (HCC)    S/P CABG (coronary artery bypass graft)    Essential hypertension    Status post coronary artery stent placement    ANNETTE treated with BiPAP    Major depressive disorder, recurrent, mild (HCC)    Mixed hyperlipidemia    History of cerebrovascular accident    History of amputation of lesser toe (HCC)    Flaccid hemiplegia of right dominant side as late effect of cerebral infarction (HCC)    Diabetic neuropathy (HCC)    Unspecified dementia with behavioral disturbance (HCC)    Coronary artery disease    Obesity    Vitamin D deficiency    Chronic venous hypertension (idiopathic) with ulcer and inflammation of bilateral lower extremity (HCC)    Longstanding persistent atrial fibrillation (HCC)    Type 2 diabetes mellitus with hyperglycemia (HCC)    Anemia of renal disease    End stage renal disease (HCC)    Dependence on intermittent renal dialysis (HCC)    Congestive heart failure, unspecified HF chronicity, unspecified heart failure type (HCC)    Obesity (BMI 30-39.9)    Acute CVA (cerebrovascular accident) (HCC)    Hypothyroidism (acquired)    Vitamin B12 deficiency    Dyspnea, unspecified type      Plan: Continue MWF HD with UF as tolerated.  Continue Midodrine for BP support.  Pulm following.  Return to rehab after D/C.  Will follow.      Yony Bhat MD   [facility]  04/11/22  08:55 EDT

## 2022-04-11 NOTE — THERAPY TREATMENT NOTE
Subjective: Pt agreeable to therapeutic plan of care.  Cognition: oriented to Person, Place and Situation    Objective:     Bed Mobility: N/A or Not attempted.  Functional Transfers: N/A or Not attempted.  Functional Ambulation: N/A or Not attempted.     Bed mobility and functional transfers not attempted today secondary to hypotension in supine - 93/53    Grooming: Mod-A and Max-A  ADL Position: sitting up in bed  ADL Comments: Patient completed hair care routine while sitting up in bed, Mod-max assist required as assistance. Use of left UE only.    Patient completed exercises in bed with BUEs. 10 reps requiring min assist with RUE. Exercises include shoulder flexion, elbow extension and flexion.    Pain: 0 VAS  Education: Provided education on importance of mobility and skilled verbal / tactile cueing throughout intervention.     Assessment: Benson Mclean presents with ADL impairments below baseline abilities which indicate the need for continued skilled intervention while inpatient. Hypotension prevented bed mobility and transfers to sit EOB this date. UE exercises and grooming completed in bed.  Tolerating session today without incident. Will continue to follow and progress as tolerated.     Plan/Recommendations:   Pt would benefit from Skilled Rehab placement at discharge from facility.   Pt desires Skilled Rehab placement at discharge. Pt cooperative; agreeable to therapeutic recommendations and plan of care.     Modified [place]: 5 = Severe disability (Requires constant nursing care and attention, bedridden, incontinent)    Post-Tx Position: Supine with HOB Elevated, Alarms activated and Call light and personal items within reach  PPE: gloves, surgical mask, eyewear protection

## 2022-04-11 NOTE — CASE MANAGEMENT/SOCIAL WORK
Continued Stay Note  DOREEN Amin     Patient Name: Benson Mclean  MRN: [id number]  Today's Date: 4/11/2022    Admit Date: 3/29/2022     Discharge Plan     Row Name 04/11/22 0955       Plan    Plan DC Plan: Accepted to [place] under Medicaid. From [facility] (spouse did not want return). No precert or PASRR required. OP HD MWF [facility].    Plan Comments Received notification from Maricruz from [facility] [place] that they have a bed ready for patient today. Notified her that chest tube was removed on 4/8. Updated MD and RN via secure chat that bed was available if patient was ready to dc.              Phone communication or documentation only - no physical contact with patient or family.    Kristina Gastelum RN     Office Phone: [phone]  Office Cell: [phone]

## 2022-04-12 NOTE — NURSING NOTE
Contacted [facility]; report given to LOKESH Beasley. Pt fed dinner, christian care performed following BM incontinence, and taken to wife's room in 2121 prior to DC. Pt currently en route to [facility] for further transition of care.

## 2022-04-12 NOTE — CASE MANAGEMENT/SOCIAL WORK
Case Management Discharge Note      Final Note: [place] under Medicaid      Selected Continued Care - Discharged on 4/11/2022 Admission date: 3/29/2022 - Discharge disposition: Rehab Facility or Unit (DC - External)    Destination Coordination complete.    Service Provider Selected Services Address Phone Fax Patient Preferred    [facility] [address] [facility] [place] [address] [phone] --           Transportation Services  Ambulance: [place]    Final Discharge Disposition Code: 04 - intermediate care facility

## 2022-04-12 NOTE — ED PROVIDER NOTES
Subjective   71-year-old male just discharged this facility yesterday for CHF with respiratory failure.  Patient states he has been breathing at his baseline and denies any chest pain or shortness of air.  Patient tells me he has worsening left lower back pain since last night.  Patient denies any trauma, no fever, no incontinence, no new weakness or numbness aside from his chronic right-sided weakness from remote CVA.  Pain is worse with movement.  Patient reportedly was hypoxic per nursing home measurement.  However medics states the pulse oximeter was changed and the patient was satting 98%.  In the ED, oxygen saturations are 99% on his 2 L.          Review of Systems   Constitutional: Negative.    Respiratory:        As per HPI   Cardiovascular: Negative.    Gastrointestinal: Negative.    Genitourinary: Negative.    Musculoskeletal: Positive for back pain.   Skin: Negative.    Neurological:        As per HPI   Psychiatric/Behavioral: Negative.        Past Medical History:   Diagnosis Date   • A-fib ([facility]) 8/3/2021   • Anemia    • Appetite loss    • Arthritis    • Brain bleed ([facility])    • Carpal tunnel syndrome on left    • CHF (congestive heart failure) ([facility])    • Chronic left-sided low back pain without sciatica 12/27/2017   • CKD (chronic kidney disease) stage 3, GFR 30-59 ml/min ([facility])    • Closed fracture of seventh thoracic vertebra ([facility]) 8/23/2020   • Cognitive communication deficit 12/1/2020   • COPD (chronic obstructive pulmonary disease) ([facility])    • Coronary artery disease    • COVID-19 2/19/2021   • Cytokine release syndrome, grade 1 5/24/2021   • Depression    • Diabetic neuropathy ([facility])    • Dialysis patient ([facility])     mon wed fri   • DM2 (diabetes mellitus, type 2) ([facility])    • GERD (gastroesophageal reflux disease)    • Hyperlipidemia    • Hypertension    • Hypogonadism in male    • Neuropathy    • Nonsustained ventricular tachycardia ([facility]) 7/23/2021   • Obesity    • Paroxysmal atrial fibrillation ([facility])  12/1/2020   • Sleep apnea    • Stroke (HCC)    • Unsteady gait        Allergies   Allergen Reactions   • Codeine Itching       Past Surgical History:   Procedure Laterality Date   • ANGIOPLASTY      X2   • APPENDECTOMY     • ARTERIOVENOUS FISTULA/SHUNT SURGERY Left 1/20/2022    Procedure: LEFT BRACHIAL CEPHALIC ARTERIAL VENOUS FISTULA CREATION;  Surgeon: Yony Davila MD;  Location: [facility] MAIN OR;  Service: Vascular;  Laterality: Left;   • CARDIAC CATHETERIZATION  11/13/2015   • CARDIAC CATHETERIZATION N/A 5/24/2021    Procedure: Left Heart Cath and coronary angiogram;  Surgeon: Jose G Rm MD;  Location:  [facility] CATH INVASIVE LOCATION;  Service: Cardiovascular;  Laterality: N/A;   • CARDIAC CATHETERIZATION  5/24/2021    Procedure: Saphenous Vein Graft;  Surgeon: Jose G Rm MD;  Location: [facility] CATH INVASIVE LOCATION;  Service: Cardiovascular;;   • CARDIAC CATHETERIZATION N/A 5/24/2021    Procedure: Percutaneous Coronary Intervention;  Surgeon: Bernabe Zambrano MD;  Location: [facility] CATH INVASIVE LOCATION;  Service: Cardiology;  Laterality: N/A;   • CARDIAC CATHETERIZATION N/A 5/24/2021    Procedure: Stent NIELS coronary;  Surgeon: Bernabe Zambrano MD;  Location:  [facility] CATH INVASIVE LOCATION;  Service: Cardiology;  Laterality: N/A;   • CARDIAC CATHETERIZATION N/A 5/26/2021    Procedure: Percutaneous Coronary Intervention;  Surgeon: Bernabe Zambrano MD;  Location:  [facility] CATH INVASIVE LOCATION;  Service: Cardiovascular;  Laterality: N/A;   • CARDIAC CATHETERIZATION N/A 5/26/2021    Procedure: Stent NIELS bypass graft;  Surgeon: Bernabe Zambrano MD;  Location: [facility] CATH INVASIVE LOCATION;  Service: Cardiovascular;  Laterality: N/A;   • CARDIAC ELECTROPHYSIOLOGY PROCEDURE N/A 5/24/2021    Procedure: Temporary Pacemaker;  Surgeon: Bernabe Zambrano MD;  Location:  [facility] CATH INVASIVE LOCATION;  Service: Cardiology;  Laterality: N/A;   • CARDIAC ELECTROPHYSIOLOGY PROCEDURE N/A 5/26/2021    Procedure: Temporary  Pacemaker;  Surgeon: Bernabe Zambrano MD;  Location: [facility] CATH INVASIVE LOCATION;  Service: Cardiovascular;  Laterality: N/A;   • CARPAL TUNNEL RELEASE Left 04/29/2018    carpal tunnel- lt hand// other hand surgeries    • CATARACT EXTRACTION, BILATERAL  2002    Dr. Lux Acosta   • CHOLECYSTECTOMY     • COLON RESECTION  2005   • CORONARY ANGIOPLASTY     • CORONARY ANGIOPLASTY WITH STENT PLACEMENT  11/13/2015    PTCA stent to proximal in stent and mid to distal lad   • CORONARY ANGIOPLASTY WITH STENT PLACEMENT  09/16/2016    PTCA stent to mid lad and stent to vein graft to marginal   • CORONARY ARTERY BYPASS GRAFT  2005    @ [facility]   • CYST REMOVAL      cyst removed from scrotum   • FOOT SURGERY Right 07/17/2018   • FOOT SURGERY Left 02/04/2019   • PORTACATH PLACEMENT     • TOE AMPUTATION Right     right toe removed D/T infected cut that went to the bone       Family History   Problem Relation Age of Onset   • Heart disease Mother    • Heart disease Father    • Diabetes Sister    • Heart disease Sister    • Diabetes Brother    • Mental illness Brother        Social History     Socioeconomic History   • Marital status:    Tobacco Use   • Smoking status: Former Smoker     Packs/day: 1.00     Years: 60.00     Pack years: 60.00     Types: Cigarettes   • Smokeless tobacco: Never Used   • Tobacco comment: Patient quit smoking 11/2020   Vaping Use   • Vaping Use: Never used   Substance and Sexual Activity   • Alcohol use: No   • Drug use: Yes     Frequency: 1.0 times per week     Types: Marijuana     Comment: socially   • Sexual activity: Defer           Objective   Physical Exam  Constitutional:       Comments: Elderly male no acute distress   HENT:      Head: Normocephalic and atraumatic.      Mouth/Throat:      Mouth: Mucous membranes are moist.      Pharynx: Oropharynx is clear.   Eyes:      Extraocular Movements: Extraocular movements intact.      Pupils: Pupils are equal, round, and reactive to light.    Cardiovascular:      Rate and Rhythm: Tachycardia present.      Heart sounds: Normal heart sounds.   Pulmonary:      Effort: Pulmonary effort is normal.      Breath sounds: Normal breath sounds.      Comments: Shiley right chest  Abdominal:      General: Bowel sounds are normal. There is no distension.      Palpations: Abdomen is soft.      Tenderness: There is no abdominal tenderness.   Musculoskeletal:      Cervical back: Normal range of motion and neck supple.      Comments: Pain with movement lower back in bed.  There is paraspinal tenderness to the left lower back.   Skin:     General: Skin is warm and dry.      Capillary Refill: Capillary refill takes less than 2 seconds.   Neurological:      Mental Status: He is oriented to person, place, and time.      Cranial Nerves: No cranial nerve deficit.      Comments: Chronic right-sided weakness, otherwise no new focal deficits   Psychiatric:         Mood and Affect: Mood normal.         Behavior: Behavior normal.         Procedures           ED Course  ED Course as of 04/12/22 0732   Tue Apr 12, 2022   0529 EKG interpretation: Tachycardia, rate 113, compared  with tracing done on 3/29/2022, IVCD has improved [JR]      ED Course User Index  [JR] Oscar Corea MD                                                 MDM  Number of Diagnoses or Management Options  Acute left-sided low back pain without sciatica  Diagnosis management comments: CT Lumbar Spine Without Contrast   Final Result        1.  No acute fracture or malalignment in the lumbar spine.    2.  Multilevel degenerative changes. Mild spinal canal narrowing due to a disc bulge at L2-L3. No high-grade osseous spinal canal narrowing. Multilevel foraminal narrowing which is up to moderate detailed above.            Electronically signed by:  Leroy Tapia DO      4/12/2022 5:24 AM     Patient well without any hypoxemia in ED, No SOA or CP, will transfer back to NH       Amount and/or Complexity of Data  Reviewed  Tests in the radiology section of CPT®: ordered and reviewed        Final diagnoses:   Acute left-sided low back pain without sciatica       ED Disposition  ED Disposition     ED Disposition   Discharge    Condition   Stable    Comment   --             Rudolph Rooney MD  [address] [id number]  [phone]    Schedule an appointment as soon as possible for a visit            Medication List      No changes were made to your prescriptions during this visit.          Oscar Corea MD  04/14/22 0241

## 2022-05-06 PROBLEM — E87.70 FLUID OVERLOAD: Status: ACTIVE | Noted: 2022-01-01

## 2022-05-06 NOTE — ED PROVIDER NOTES
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  He had 1 hour left.  Reports he had a 1 week history of productive cough and clear sputum.  He denies associated fever sweats chills.  Denies chest pain.  He reports Moderna series to completion and booster.  Reports flu and pneumonia series to lesion.  Dr. Roberts is his nephrologist.     1. Location: Generalized  2. Quality: Dyspneic  3. Severity: Mild to moderate  4. Worsening factors: Not completing dialysis  5. Alleviating factors: Denies  6. Onset: Ongoing  7. Radiation: Denies  8. Frequency: Constant with periods of intensity  9. Co-morbidities: Past Medical History:  8/3/2021: A-fib (Formerly [facility])  No date: Anemia  No date: Appetite loss  No date: Arthritis  No date: Brain bleed (Formerly [facility])  No date: Carpal tunnel syndrome on left  No date: CHF (congestive heart failure) (Formerly [facility])  12/27/2017: Chronic left-sided low back pain without sciatica  No date: CKD (chronic kidney disease) stage 3, GFR 30-59 ml/min (Formerly [facility])  8/23/2020: Closed fracture of seventh thoracic vertebra (Formerly [facility])  12/1/2020: Cognitive communication deficit  No date: COPD (chronic obstructive pulmonary disease) (Formerly [facility])  No date: Coronary artery disease  2/19/2021: COVID-19  5/24/2021: Cytokine release syndrome, grade 1  No date: Depression  No date: Diabetic neuropathy ([facility]ly [facility])  No date: Dialysis patient (Formerly [facility])      Comment:  mon wed fri  No date: DM2 (diabetes mellitus, type 2) (Formerly [facility])  No date: GERD (gastroesophageal reflux disease)  No date: Hyperlipidemia  No date: Hypertension  No date: Hypogonadism in male  No date: Neuropathy  7/23/2021: Nonsustained ventricular tachycardia ([facility])  No date: Obesity  12/1/2020: Paroxysmal atrial fibrillation ([facility]ly [facility])  No date: Sleep apnea  No date: Stroke ([facility])  No date: Unsteady gait  10. Source: Patient            Review of Systems   Constitutional: Negative for chills, diaphoresis and fever.    HENT: Negative for congestion, ear pain, rhinorrhea, sneezing, sore throat, trouble swallowing and voice change.    Respiratory: Positive for cough and shortness of breath. Negative for chest tightness, wheezing and stridor.    Cardiovascular: Negative for chest pain, palpitations and leg swelling.   Gastrointestinal: Negative for nausea and vomiting.   Musculoskeletal: Positive for gait problem (Hemiplegic).   Skin: Negative for color change, pallor, rash and wound.   Allergic/Immunologic: Negative for immunocompromised state.   Neurological: Negative for dizziness and weakness.   Psychiatric/Behavioral: Negative for confusion.   All other systems reviewed and are negative.      Past Medical History:   Diagnosis Date   • A-fib ([facility]) 8/3/2021   • Anemia    • Appetite loss    • Arthritis    • Brain bleed ([facility])    • Carpal tunnel syndrome on left    • CHF (congestive heart failure) ([facility])    • Chronic left-sided low back pain without sciatica 12/27/2017   • CKD (chronic kidney disease) stage 3, GFR 30-59 ml/min ([facility])    • Closed fracture of seventh thoracic vertebra ([facility]) 8/23/2020   • Cognitive communication deficit 12/1/2020   • COPD (chronic obstructive pulmonary disease) ([facility])    • Coronary artery disease    • COVID-19 2/19/2021   • Cytokine release syndrome, grade 1 5/24/2021   • Depression    • Diabetic neuropathy ([facility])    • Dialysis patient ([facility])     mon wed fri   • DM2 (diabetes mellitus, type 2) ([facility])    • GERD (gastroesophageal reflux disease)    • Hyperlipidemia    • Hypertension    • Hypogonadism in male    • Neuropathy    • Nonsustained ventricular tachycardia ([facility]) 7/23/2021   • Obesity    • Paroxysmal atrial fibrillation ([facility]) 12/1/2020   • Sleep apnea    • Stroke ([facility])    • Unsteady gait        Allergies   Allergen Reactions   • Codeine Itching       Past Surgical History:   Procedure Laterality Date   • ANGIOPLASTY      X2   • APPENDECTOMY     • ARTERIOVENOUS FISTULA/SHUNT SURGERY Left 1/20/2022     Procedure: LEFT BRACHIAL CEPHALIC ARTERIAL VENOUS FISTULA CREATION;  Surgeon: Yony Davila MD;  Location: [facility] MAIN OR;  Service: Vascular;  Laterality: Left;   • CARDIAC CATHETERIZATION  11/13/2015   • CARDIAC CATHETERIZATION N/A 5/24/2021    Procedure: Left Heart Cath and coronary angiogram;  Surgeon: Jose G Rm MD;  Location:  [facility] CATH INVASIVE LOCATION;  Service: Cardiovascular;  Laterality: N/A;   • CARDIAC CATHETERIZATION  5/24/2021    Procedure: Saphenous Vein Graft;  Surgeon: Jose G Rm MD;  Location:  [facility] CATH INVASIVE LOCATION;  Service: Cardiovascular;;   • CARDIAC CATHETERIZATION N/A 5/24/2021    Procedure: Percutaneous Coronary Intervention;  Surgeon: Bernabe Zambrano MD;  Location: [facility] CATH INVASIVE LOCATION;  Service: Cardiology;  Laterality: N/A;   • CARDIAC CATHETERIZATION N/A 5/24/2021    Procedure: Stent NIELS coronary;  Surgeon: Bernabe Zambrano MD;  Location: [facility] CATH INVASIVE LOCATION;  Service: Cardiology;  Laterality: N/A;   • CARDIAC CATHETERIZATION N/A 5/26/2021    Procedure: Percutaneous Coronary Intervention;  Surgeon: Bernabe Zambrano MD;  Location:  [facility] CATH INVASIVE LOCATION;  Service: Cardiovascular;  Laterality: N/A;   • CARDIAC CATHETERIZATION N/A 5/26/2021    Procedure: Stent NIELS bypass graft;  Surgeon: Bernabe Zambrano MD;  Location: [facility] CATH INVASIVE LOCATION;  Service: Cardiovascular;  Laterality: N/A;   • CARDIAC ELECTROPHYSIOLOGY PROCEDURE N/A 5/24/2021    Procedure: Temporary Pacemaker;  Surgeon: Bernabe Zambrano MD;  Location: [facility] CATH INVASIVE LOCATION;  Service: Cardiology;  Laterality: N/A;   • CARDIAC ELECTROPHYSIOLOGY PROCEDURE N/A 5/26/2021    Procedure: Temporary Pacemaker;  Surgeon: Bernabe Zambrano MD;  Location: [facility] CATH INVASIVE LOCATION;  Service: Cardiovascular;  Laterality: N/A;   • CARPAL TUNNEL RELEASE Left 04/29/2018    carpal tunnel- lt hand// other hand surgeries    • CATARACT EXTRACTION, BILATERAL  2002    Dr. Lux Acosta    • CHOLECYSTECTOMY     • COLON RESECTION  2005   • CORONARY ANGIOPLASTY     • CORONARY ANGIOPLASTY WITH STENT PLACEMENT  11/13/2015    PTCA stent to proximal in stent and mid to distal lad   • CORONARY ANGIOPLASTY WITH STENT PLACEMENT  09/16/2016    PTCA stent to mid lad and stent to vein graft to marginal   • CORONARY ARTERY BYPASS GRAFT  2005    @ [facility]   • CYST REMOVAL      cyst removed from scrotum   • FOOT SURGERY Right 07/17/2018   • FOOT SURGERY Left 02/04/2019   • PORTACATH PLACEMENT     • TOE AMPUTATION Right     right toe removed D/T infected cut that went to the bone       Family History   Problem Relation Age of Onset   • Heart disease Mother    • Heart disease Father    • Diabetes Sister    • Heart disease Sister    • Diabetes Brother    • Mental illness Brother        Social History     Socioeconomic History   • Marital status:    Tobacco Use   • Smoking status: Former Smoker     Packs/day: 1.00     Years: 60.00     Pack years: 60.00     Types: Cigarettes   • Smokeless tobacco: Never Used   • Tobacco comment: Patient quit smoking 11/2020   Vaping Use   • Vaping Use: Never used   Substance and Sexual Activity   • Alcohol use: No   • Drug use: Yes     Frequency: 1.0 times per week     Types: Marijuana     Comment: socially   • Sexual activity: Defer           Objective   Physical Exam  Vitals and nursing note reviewed.   Constitutional:       General: He is awake. He is not in acute distress.     Appearance: He is well-developed. He is morbidly obese. He is not ill-appearing, toxic-appearing or diaphoretic.   HENT:      Head: Normocephalic and atraumatic.   Eyes:      Conjunctiva/sclera: Conjunctivae normal.      Pupils: Pupils are equal, round, and reactive to light.   Cardiovascular:      Rate and Rhythm: Normal rate and regular rhythm.      Heart sounds: Normal heart sounds, S1 normal and S2 normal. Heart sounds not distant. No murmur heard.    No friction rub. No gallop.   Pulmonary:       Effort: Pulmonary effort is normal. No respiratory distress.      Breath sounds: Examination of the right-lower field reveals rales. Examination of the left-lower field reveals rales. Rales present. No wheezing.   Chest:      Chest wall: No tenderness.   Abdominal:      General: Bowel sounds are normal. There is no distension.      Palpations: Abdomen is soft. There is no mass.      Tenderness: There is no abdominal tenderness. There is no guarding or rebound.      Hernia: No hernia is present.   Musculoskeletal:      Cervical back: Normal range of motion and neck supple.      Right lower le+ Pitting Edema present.      Left lower le+ Pitting Edema present.   Skin:     General: Skin is warm and dry.      Capillary Refill: Capillary refill takes less than 2 seconds.      Coloration: Skin is not pale.      Findings: No erythema or rash.   Neurological:      Mental Status: He is alert and oriented to person, place, and time.      GCS: GCS eye subscore is 4. GCS verbal subscore is 5. GCS motor subscore is 6.   Psychiatric:         Mood and Affect: Mood normal.         Behavior: Behavior normal. Behavior is cooperative.         Thought Content: Thought content normal.         Judgment: Judgment normal.         Procedures    Sinus tachycardia with PVC and incomplete left bundle branch block.  Low voltage in extremity leads rate 118.  Compared to previous EKG from 2022 sinus atrial tachycardia multifocal PVCs rate 113.  Interpreted Dr. Hawthorne and reviewed by me.             ED Course  ED Course as of 22 1743   Fri May 06, 2022   1259 Awaiting troponin and BNP results. [AL]      ED Course User Index  [AL] Unique Irving, APRN      XR Chest 1 View    Result Date: 2022  Small right greater than left pleural effusions. Bibasilar airspace disease, right greater than left, favored to represent atelectasis.  Electronically Signed By-Demetria Jolley MD On:2022 2:16 PM This report was finalized on  [id number] by  Demetria Jolley MD.    Medications   sodium chloride 0.9 % flush 10 mL (has no administration in time range)   sodium chloride 0.9 % flush 10 mL (has no administration in time range)   sodium chloride 0.9 % flush 10 mL (has no administration in time range)   acetaminophen (TYLENOL) tablet 650 mg (has no administration in time range)     Or   acetaminophen (TYLENOL) 160 MG/5ML solution 650 mg (has no administration in time range)     Or   acetaminophen (TYLENOL) suppository 650 mg (has no administration in time range)   aluminum-magnesium hydroxide-simethicone (MAALOX MAX) 400-400-40 MG/5ML suspension 15 mL (has no administration in time range)   ondansetron (ZOFRAN) tablet 4 mg (has no administration in time range)     Or   ondansetron (ZOFRAN) injection 4 mg (has no administration in time range)   dextrose (GLUTOSE) oral gel 15 g (has no administration in time range)   dextrose (D50W) (25 g/50 mL) IV injection 25 g (has no administration in time range)   glucagon (human recombinant) (GLUCAGEN DIAGNOSTIC) 1 mg in sterile water (preservative free) 1 mL injection (has no administration in time range)   insulin lispro (ADMELOG) injection 0-7 Units (has no administration in time range)     And   insulin lispro (ADMELOG) injection 0-7 Units (has no administration in time range)   melatonin tablet 5 mg (has no administration in time range)     Labs Reviewed   COMPREHENSIVE METABOLIC PANEL - Abnormal; Notable for the following components:       Result Value    Creatinine 2.74 (*)     Chloride 95 (*)     CO2 30.0 (*)     Calcium 8.1 (*)     Albumin 3.10 (*)     BUN/Creatinine Ratio 5.5 (*)     eGFR 24.0 (*)     All other components within normal limits    Narrative:     GFR Normal >60  Chronic Kidney Disease <60  Kidney Failure <15     CBC WITH AUTO DIFFERENTIAL - Abnormal; Notable for the following components:    RBC 3.78 (*)     Hemoglobin 11.0 (*)     Hematocrit 35.8 (*)     MCHC 30.7 (*)     RDW 16.5  (*)     RDW-SD 55.1 (*)     Lymphocyte % 17.5 (*)     All other components within normal limits   BNP (IN-HOUSE) - Abnormal; Notable for the following components:    proBNP >70,000.0 (*)     All other components within normal limits    Narrative:     Among patients with dyspnea, NT-proBNP is highly sensitive for the detection of acute congestive heart failure. In addition NT-proBNP of <300 pg/ml effectively rules out acute congestive heart failure with 99% negative predictive value.    Results may be falsely decreased if patient taking Biotin.     TROPONIN (IN-HOUSE) - Abnormal; Notable for the following components:    Troponin T 0.330 (*)     All other components within normal limits    Narrative:     Troponin T Reference Range:  <= 0.03 ng/mL-   Negative for AMI  >0.03 ng/mL-     Abnormal for myocardial necrosis.  Clinicians would have to utilize clinical acumen, EKG, Troponin and serial changes to determine if it is an Acute Myocardial Infarction or myocardial injury due to an underlying chronic condition.       Results may be falsely decreased if patient taking Biotin.     COVID-19,CEPHEID/ROHAN,COR/[facility]/PAD/BEATRICE IN-HOUSE,NP SWAB IN TRANSPORT MEDIA 3-4 HR TAT, RT-PCR - Normal    Narrative:     Fact sheet for providers: https://www.fda.gov/media/495911/download     Fact sheet for patients: https://www.fda.gov/media/438698/download  Fact sheet for providers: https://www.fda.gov/media/637954/download    Fact sheet for patients: https://www.fda.gov/media/198119/download    Test performed by PCR.   POC LACTATE - Normal   COVID PRE-OP / PRE-PROCEDURE SCREENING ORDER (NO ISOLATION)    Narrative:     The following orders were created for panel order COVID PRE-OP / PRE-PROCEDURE SCREENING ORDER (NO ISOLATION) - Swab, Nasopharynx.  Procedure                               Abnormality         Status                     ---------                               -----------         ------                      COVID-19,CEPHEID/ROHAN,CO...[[id number]]  Normal              Final result                 Please view results for these tests on the individual orders.   BLOOD CULTURE   BLOOD CULTURE   RAINBOW DRAW    Narrative:     The following orders were created for panel order [place] Draw.  Procedure                               Abnormality         Status                     ---------                               -----------         ------                     Green Top (Gel)[[id number]]                                  Final result               Lavender Top[[id number]]                                     Final result               Gold Top - SST[[id number]]                                   Final result               Light Blue Top[[id number]]                                   Final result                 Please view results for these tests on the individual orders.   HEMOGLOBIN A1C   TROPONIN (IN-HOUSE)   TROPONIN (IN-HOUSE)   POC LACTATE   POCT GLUCOSE FINGERSTICK   POCT GLUCOSE FINGERSTICK   POCT GLUCOSE FINGERSTICK   POCT GLUCOSE FINGERSTICK   CBC AND DIFFERENTIAL    Narrative:     The following orders were created for panel order CBC & Differential.  Procedure                               Abnormality         Status                     ---------                               -----------         ------                     CBC Auto Differential[[id number]]        Abnormal            Final result                 Please view results for these tests on the individual orders.   GREEN TOP   LAVENDER TOP   GOLD TOP - SST   LIGHT BLUE TOP                                                MDM  Number of Diagnoses or Management Options  Dyspnea, unspecified type  Noncompliance with renal dialysis (HCC)  Pleural effusion, bilateral  Stage 5 chronic kidney disease on chronic dialysis (HCC)  Diagnosis management comments: Chart Review: 4/12/2022 patient was seen at this facility for back pain.  Comorbidity: Past Medical  History:  8/3/2021: A-fib ([facility])  No date: Anemia  No date: Appetite loss  No date: Arthritis  No date: Brain bleed ([facility])  No date: Carpal tunnel syndrome on left  No date: CHF (congestive heart failure) ([facility])  12/27/2017: Chronic left-sided low back pain without sciatica  No date: CKD (chronic kidney disease) stage 3, GFR 30-59 ml/min ([facility])  8/23/2020: Closed fracture of seventh thoracic vertebra ([facility])  12/1/2020: Cognitive communication deficit  No date: COPD (chronic obstructive pulmonary disease) ([facility])  No date: Coronary artery disease  2/19/2021: COVID-19  5/24/2021: Cytokine release syndrome, grade 1  No date: Depression  No date: Diabetic neuropathy ([facility])  No date: Dialysis patient ([facility])      Comment:  mon wed fri  No date: DM2 (diabetes mellitus, type 2) ([facility])  No date: GERD (gastroesophageal reflux disease)  No date: Hyperlipidemia  No date: Hypertension  No date: Hypogonadism in male  No date: Neuropathy  7/23/2021: Nonsustained ventricular tachycardia ([facility])  No date: Obesity  12/1/2020: Paroxysmal atrial fibrillation ([facility])  No date: Sleep apnea  No date: Stroke ([facility])  No date: Unsteady gait  Imaging: Was interpreted by physician and reviewed by myself: XR Chest 1 View   Final Result    Small right greater than left pleural effusions. Bibasilar airspace    disease, right greater than left, favored to represent atelectasis.         Electronically Signed By-Demetria Jolley MD On:5/6/2022 2:16 PM    This report was finalized on [id number] by  Demetria Jolley MD.    Patient undressed and placed in gown for exam.  Appropriate PPE worn during patient exam.  Patient presents in no acute distress.  S1-S2 heart sounds on exam.  He has fine rales in the bases.  He has 1+ pitting edema to lower extremities.  Abdomen is obese.  IV established and labs obtained.  Dyspnea work-up initiated.CBC is similar to previous sodium 138 potassium 3.8 chloride 95 bicarb 30 BUN 15 creatinine 2.74 glucose 99 BNP greater than  70,000, similar to previous troponin was elevated at 0.33, previously as high as 0.792 lactic 0.9 blood cultures pending chest x-ray was significant for bilateral pleural effusions, right greater than left with bibasilar airspace disease.  Spoke with patient's nephrologist to would like him to be admitted for completion of dialysis.  Hospitalist was paged for admission.  Spoke with Dr. Shannon.    Disposition/Treatment: Discussed results with patient, verbalized understanding.  Agreeable with plan of care.  Patient was stable upon admission.       Part of this note may be an electronic transcription/translation of spoken language to printed text using the Dragon Dictation System.            Amount and/or Complexity of Data Reviewed  Clinical lab tests: reviewed  Tests in the radiology section of CPT®: reviewed  Tests in the medicine section of CPT®: reviewed  Decide to obtain previous medical records or to obtain history from someone other than the patient: yes  Discuss the patient with other providers: (Spoke with Dr. eByer, nephrologist who would like patient to be admitted and will complete dialysis session.)    Patient Progress  Patient progress: stable      Final diagnoses:   Stage 5 chronic kidney disease on chronic dialysis (HCC)   Dyspnea, unspecified type   Noncompliance with renal dialysis (HCC)   Pleural effusion, bilateral       ED Disposition  ED Disposition     ED Disposition   Decision to Admit    Condition   --    Comment   Level of Care: Telemetry [5]   Diagnosis: Fluid overload [[id number]]   Admitting Physician: LUKAS SHANNON [[id number]]   Attending Physician: LUKAS SHANNON [[id number]]               No follow-up provider specified.       Medication List      No changes were made to your prescriptions during this visit.          Unique Irving APRN  05/06/22 1621       Unique Irving APRN  05/06/22 174
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
## 2022-05-06 NOTE — H&P
[facility] Medicine Services      Patient Name: Benson Mclean  : 1950  MRN: [id number]  Primary Care Physician:  Rudolph Rooney MD  Date of admission: 2022      Subjective      Chief Complaint: shortness of breath     History of Present Illness: Benson Mclean is a 71 y.o. male with multiple medical issues including ESRD on HD Ytduon-Eanugzjzn-Eruqhh, chronic systolic congestive heart failure, CAD s/p CABG, cardiac stent, chronic atrial fibrillation, CVA with residual right-sided weakness, COPD on chronic oxygen 3L O2 via NC, ANNETTE on Cpap, previous PE,  HTN, HLD, type 2 diabetes mellitus complicated by neuropathy, anemia of renal disease, vitamin deficiencies, and dementia who presented to [facility] ED 2022 from dialysis center. Apparently he was sent to the ED for shortness of breath before completing his dialysis treatment. He had one hour left per nursing report. He also apparently did not have dialysis on Wednesday. The patient complains of shortness of breath and cough, possibly a fever. No increased lower extremity edema. He stated he is bedbound. He is a poor historian and not willing to offer a great deal of information.     In the ED the patient had CXR that showed small right greater than left pleural effusions.  Bibasilar airspace disease, right greater than left, favored to represent atelectasis.  proBNP greater then 70,000, troponin 0.33, creatinine 2.74, BUN 15, normal potassium, normal WBC, normal lactate, Hgb 11.0.  Blood cultures drawn.  COVID-negative.  Pt on his home rate of 3L O2 via nasal cannula. Nephrology was consulted with plans for hemodialysis.  He was admitted to the hospitalist group for further treatment of acute on chronic respiratory failure secondary to fluid overload, needs dialysis.       Review of Systems   Constitutional: Negative.   HENT: Negative.    Eyes: Negative.    Cardiovascular: Negative.    Respiratory: Positive  for cough and shortness of breath.    Endocrine: Negative.    Hematologic/Lymphatic: Negative.    Skin: Negative.    Musculoskeletal: Negative.    Gastrointestinal: Negative.    Genitourinary: Negative.    Neurological: Negative.    Psychiatric/Behavioral: Negative.    Allergic/Immunologic: Negative.    All other systems reviewed and are negative.       Personal History     Past Medical History:   Diagnosis Date   • A-fib ([facility]) 8/3/2021   • Anemia    • Appetite loss    • Arthritis    • Brain bleed ([facility])    • Carpal tunnel syndrome on left    • CHF (congestive heart failure) ([facility])    • Chronic left-sided low back pain without sciatica 12/27/2017   • CKD (chronic kidney disease) stage 3, GFR 30-59 ml/min ([facility])    • Closed fracture of seventh thoracic vertebra ([facility]) 8/23/2020   • Cognitive communication deficit 12/1/2020   • COPD (chronic obstructive pulmonary disease) ([facility])    • Coronary artery disease    • COVID-19 2/19/2021   • Cytokine release syndrome, grade 1 5/24/2021   • Depression    • Diabetic neuropathy ([facility])    • Dialysis patient ([facility])     mon wed fri   • DM2 (diabetes mellitus, type 2) ([facility])    • GERD (gastroesophageal reflux disease)    • Hyperlipidemia    • Hypertension    • Hypogonadism in male    • Neuropathy    • Nonsustained ventricular tachycardia ([facility]) 7/23/2021   • Obesity    • Paroxysmal atrial fibrillation ([facility]) 12/1/2020   • Sleep apnea    • Stroke ([facility])    • Unsteady gait        Past Surgical History:   Procedure Laterality Date   • ANGIOPLASTY      X2   • APPENDECTOMY     • ARTERIOVENOUS FISTULA/SHUNT SURGERY Left 1/20/2022    Procedure: LEFT BRACHIAL CEPHALIC ARTERIAL VENOUS FISTULA CREATION;  Surgeon: Yony Davila MD;  Location: [facility] MAIN OR;  Service: Vascular;  Laterality: Left;   • CARDIAC CATHETERIZATION  11/13/2015   • CARDIAC CATHETERIZATION N/A 5/24/2021    Procedure: Left Heart Cath and coronary angiogram;  Surgeon: Jose G Rm MD;  Location: [facility] CATH INVASIVE  LOCATION;  Service: Cardiovascular;  Laterality: N/A;   • CARDIAC CATHETERIZATION  5/24/2021    Procedure: Saphenous Vein Graft;  Surgeon: Jose G Rm MD;  Location:  [facility] CATH INVASIVE LOCATION;  Service: Cardiovascular;;   • CARDIAC CATHETERIZATION N/A 5/24/2021    Procedure: Percutaneous Coronary Intervention;  Surgeon: Bernabe Zambrano MD;  Location:  [facility] CATH INVASIVE LOCATION;  Service: Cardiology;  Laterality: N/A;   • CARDIAC CATHETERIZATION N/A 5/24/2021    Procedure: Stent NIELS coronary;  Surgeon: Bernabe Zambrano MD;  Location:  [facility] CATH INVASIVE LOCATION;  Service: Cardiology;  Laterality: N/A;   • CARDIAC CATHETERIZATION N/A 5/26/2021    Procedure: Percutaneous Coronary Intervention;  Surgeon: Bernabe Zambrano MD;  Location:  [facility] CATH INVASIVE LOCATION;  Service: Cardiovascular;  Laterality: N/A;   • CARDIAC CATHETERIZATION N/A 5/26/2021    Procedure: Stent NIELS bypass graft;  Surgeon: Bernabe Zambrano MD;  Location:  [facility] CATH INVASIVE LOCATION;  Service: Cardiovascular;  Laterality: N/A;   • CARDIAC ELECTROPHYSIOLOGY PROCEDURE N/A 5/24/2021    Procedure: Temporary Pacemaker;  Surgeon: Bernabe Zambrano MD;  Location:  [facility] CATH INVASIVE LOCATION;  Service: Cardiology;  Laterality: N/A;   • CARDIAC ELECTROPHYSIOLOGY PROCEDURE N/A 5/26/2021    Procedure: Temporary Pacemaker;  Surgeon: Bernabe Zambrano MD;  Location:  [facility] CATH INVASIVE LOCATION;  Service: Cardiovascular;  Laterality: N/A;   • CARPAL TUNNEL RELEASE Left 04/29/2018    carpal tunnel- lt hand// other hand surgeries    • CATARACT EXTRACTION, BILATERAL  2002    Dr. Lux Acosta   • CHOLECYSTECTOMY     • COLON RESECTION  2005   • CORONARY ANGIOPLASTY     • CORONARY ANGIOPLASTY WITH STENT PLACEMENT  11/13/2015    PTCA stent to proximal in stent and mid to distal lad   • CORONARY ANGIOPLASTY WITH STENT PLACEMENT  09/16/2016    PTCA stent to mid lad and stent to vein graft to marginal   • CORONARY ARTERY BYPASS GRAFT  2005    @ [facility]   • CYST REMOVAL       cyst removed from scrotum   • FOOT SURGERY Right 07/17/2018   • FOOT SURGERY Left 02/04/2019   • PORTACATH PLACEMENT     • TOE AMPUTATION Right     right toe removed D/T infected cut that went to the bone       Family History: family history includes Diabetes in his brother and sister; Heart disease in his father, mother, and sister; Mental illness in his brother. Otherwise pertinent FHx was reviewed and not pertinent to current issue.    Social History:  reports that he has quit smoking. His smoking use included cigarettes. He has a 60.00 pack-year smoking history. He has never used smokeless tobacco. He reports current drug use. Frequency: 1.00 time per week. Drug: Marijuana. He reports that he does not drink alcohol.    Home Medications:  Prior to Admission Medications     Prescriptions Last Dose Informant Patient Reported? Taking?    albuterol sulfate  (90 Base) MCG/ACT inhaler   Yes No    Inhale 2 puffs Every 4 (Four) Hours.    apixaban (ELIQUIS) 5 MG tablet tablet   No No    Take 1 tablet by mouth Every 12 (Twelve) Hours. Indications: Atrial Fibrillation    atorvastatin (LIPITOR) 40 MG tablet   Yes No    Take 40 mg by mouth Every Night.    bisacodyl (DULCOLAX) 10 MG suppository   Yes No    Insert 10 mg into the rectum Daily As Needed for Constipation.    budesonide (Pulmicort) 0.5 MG/2ML nebulizer solution   No No    Take 2 mL by nebulization 2 (Two) Times a Day.    cholecalciferol (VITAMIN D3) 25 MCG (1000 UT) tablet   Yes No    Take 1,000 Units by mouth Daily.    docusate sodium (COLACE) 100 MG capsule   Yes No    Take 100 mg by mouth Daily.    donepezil (ARICEPT) 10 MG tablet   Yes No    Take 10 mg by mouth Every Night.    fluticasone (FLONASE) 50 MCG/ACT nasal spray   Yes No    2 sprays by Each Nare route 2 (Two) Times a Day.    guaiFENesin (MUCINEX) 600 MG 12 hr tablet   Yes No    Take 600 mg by mouth 2 (Two) Times a Day.    ipratropium-albuterol (DUO-NEB) 0.5-2.5 mg/3 ml nebulizer   No No     Take 3 mL by nebulization 3 (Three) Times a Day.    ipratropium-albuterol (DUO-NEB) 0.5-2.5 mg/3 ml nebulizer   No No    Take 3 mL by nebulization Every 4 (Four) Hours As Needed for Wheezing.    levothyroxine (SYNTHROID, LEVOTHROID) 50 MCG tablet   Yes No    Take 50 mcg by mouth Every Morning.    Metoprolol Tartrate 37.5 MG tablet   Yes No    Take 37.5 mg by mouth 2 (Two) Times a Day. Hold for SBP <110 or HR < 60    midodrine (PROAMATINE) 10 MG tablet   Yes No    Take 10 mg by mouth 3 (Three) Times a Day Before Meals. Hold for BP > 140/90    omeprazole (priLOSEC) 20 MG capsule   Yes No    Take 20 mg by mouth Daily.    phenylephrine-mineral oil-petrolatum (Preparation H) 0.25-14-74.9 % ointment hemorrhoidal ointment   Yes No    Insert 1 application into the rectum Daily As Needed.    primidone (MYSOLINE) 50 MG tablet   Yes No    Take 25 mg by mouth Daily.    sertraline (ZOLOFT) 50 MG tablet   Yes No    Take 50 mg by mouth Daily.    vitamin B-12 (CYANOCOBALAMIN) 1000 MCG tablet   Yes No    Take 1,000 mcg by mouth Daily.            Allergies:  Allergies   Allergen Reactions   • Codeine Itching       Objective      Vitals:   Temp:  [98 °F (36.7 °C)] 98 °F (36.7 °C)  Heart Rate:  [114-118] 118  Resp:  [24] 24  BP: (100-129)/(60-70) 102/65  Flow (L/min):  [6] 6    Physical Exam  Vitals and nursing note reviewed.   Constitutional:       Appearance: He is obese.   HENT:      Head: Normocephalic and atraumatic.   Eyes:      Extraocular Movements: Extraocular movements intact.      Pupils: Pupils are equal, round, and reactive to light.   Cardiovascular:      Rate and Rhythm: Normal rate and regular rhythm.      Pulses: Normal pulses.   Pulmonary:      Effort: Pulmonary effort is normal.      Breath sounds: Examination of the right-lower field reveals decreased breath sounds. Examination of the left-lower field reveals decreased breath sounds. Decreased breath sounds present.   Abdominal:      General: Bowel sounds are  normal.      Palpations: Abdomen is soft.      Tenderness: There is no abdominal tenderness.   Musculoskeletal:      Cervical back: Normal range of motion.      Comments: Bilateral leg weakness with chronic right leg weakness from previous CVA, bedbound at baseline   Skin:     General: Skin is warm and dry.   Neurological:      General: No focal deficit present.      Mental Status: He is alert.          Result Review    Result Review:  I have personally reviewed the results from the time of this admission to 5/6/2022 16:42 EDT and agree with these findings:  [x]  Laboratory  [x]  Microbiology  [x]  Radiology  [x]  EKG/Telemetry   []  Cardiology/Vascular   []  Pathology  [x]  Old records      Assessment/Plan        Active Hospital Problems:  Active Hospital Problems    Diagnosis    • **Fluid overload    • Chronic respiratory failure with hypoxia, on home oxygen therapy ([facility])    • Elevated troponin    • Hypothyroidism (acquired)    • End stage renal disease (HCC)    • Anemia of renal disease    • Mixed hyperlipidemia    • Major depressive disorder, recurrent, mild ([facility])    • Flaccid hemiplegia of right dominant side as late effect of cerebral infarction ([facility])    • COPD with acute exacerbation ([facility])    • S/P CABG (coronary artery bypass graft)    • Essential hypertension    • ANNETTE treated with BiPAP      Plan:     Shortness of breath secondary to fluid overload  Chronic respiratory failure with oxygen dependence   -CXR: small right greater than left pleural effusions.  Bibasilar airspace disease, right greater than left, favored to represent atelectasis.   -proBNP >70,000 (previous proBNP march 2022 was also >70,000)  -pt on his home rate of 3L O2 via NC  -pt did not complete dialysis  -nephrology consulted with plans for stat dialysis     Chronically elevated troponin  -troponin 0.33 compared to troponin 0.146 on 3/12/2022.   -Will trend     ESRD on HD MWF  -on midodrine for BP support  -nephrology to manage    CVA  with right sided residual weakness  -on eliquis, statin    CAD s/p CABG, PCI  Chronic atrial fibrillation  Hyperlipidemia  -chronic, controlled.   -continue Eliquis, atorvastatin, and metoprolol    DM type 2  -hemoglobin A1c 6.3 on 3/12/2022 and previous admission basal insulin stopped due to hypoglycemia  -SSI AC/HS     Hypothyroidism  -Continue home synthroid     Major depressive disorder, recurrent, mild   Dementia  -Continue home zoloft, aricept    ANNETTE  -cpap qhs      Obesity (BMI 30-39.9)  BMI 36.92  -Lifestyle modifications to reduce cardiovascular risk factors      DVT prophylaxis: eliquis     CODE STATUS:       Admission Status:  I believe this patient meets observation status.    I discussed the patient's findings and my recommendations with patient.    This patient has been examined wearing appropriate Personal Protective Equipment. 05/06/22      Signature: Electronically signed by FABIOLA Christianson, 05/06/22, 4:43 PM EDT.

## 2022-05-07 NOTE — PROGRESS NOTES
Name: Benson Mclean  Age: 71 y.o.  : 1950  Sex: male    22    Subjective  Patient feels well no complaints.     Interval History   Hemodialysis yesterday      Objective:    Vital Signs  Temp:  [97.9 °F (36.6 °C)-98.4 °F (36.9 °C)] 97.9 °F (36.6 °C)  Heart Rate:  [117-118] 118  Resp:  [18-20] 18  BP: ()/(60-70) 96/60        Intake/Output Summary (Last 24 hours) at 2022 1124  Last data filed at 2022 0850  Gross per 24 hour   Intake 120 ml   Output --   Net 120 ml           Physical Exam  Physical Exam  Constitutional:       General: He is not in acute distress.     Appearance: He is not diaphoretic.   HENT:      Head: Normocephalic and atraumatic.      Nose: Nose normal.   Eyes:      Pupils: Pupils are equal, round, and reactive to light.   Neck:      Thyroid: No thyromegaly.      Vascular: No JVD.      Trachea: No tracheal deviation.   Cardiovascular:      Rate and Rhythm: Normal rate and regular rhythm.      Heart sounds: No murmur heard.    No friction rub. No gallop.   Pulmonary:      Effort: Pulmonary effort is normal. No respiratory distress.      Breath sounds: No stridor. No wheezing or rales.   Chest:      Chest wall: No tenderness.   Abdominal:      General: Bowel sounds are normal.      Palpations: Abdomen is soft. There is no mass.      Tenderness: There is no abdominal tenderness. There is no guarding or rebound.      Hernia: No hernia is present.   Musculoskeletal:         General: No tenderness or deformity. Normal range of motion.      Cervical back: Normal range of motion and neck supple.   Skin:     General: Skin is warm and dry.      Coloration: Skin is not pale.      Findings: No rash.   Neurological:      Mental Status: He is alert and oriented to person, place, and time.      Cranial Nerves: No cranial nerve deficit.      Motor: No abnormal muscle tone.      Coordination: Coordination normal.      Deep Tendon Reflexes: Reflexes are normal and symmetric. Reflexes  normal.   Psychiatric:         Behavior: Behavior normal.         Thought Content: Thought content normal.         Judgment: Judgment normal.            Results Review:      Results from last 7 days   Lab Units 05/07/22  0417 05/06/22  1045   SODIUM mmol/L 134* 138   CO2 mmol/L 26.0 30.0*   BUN mg/dL 12 15   CREATININE mg/dL 2.24* 2.74*   CALCIUM mg/dL 8.1* 8.1*   ALBUMIN g/dL  --  3.10*   AST (SGOT) U/L  --  11   ALT (SGPT) U/L  --  5   EGFR mL/min/1.73 30.6* 24.0*       Results from last 7 days   Lab Units 05/07/22  0417 05/06/22  1045   WBC 10*3/mm3 6.70 5.20       Imaging studies: I personally reviewed the patient's most recent pertinent imaging studies       Medication Review:   cefTRIAXone, 1 g, Intravenous, Q24H  insulin lispro, 0-7 Units, Subcutaneous, TID AC  sodium chloride, 10 mL, Intravenous, Q12H          Assessment  End-stage renal disease    Shortness of breath, patient has a history of chronic respiratory failure on home oxygen.  Mild volume overload on chest x-ray    Positive blood cultures  Patient growing strep    History of CVA    History of coronary artery disease    Diabetes      Plan:  Patient underwent hemodialysis yesterday.    Vital signs are stable    Labs reviewed BUN is 12 creatinine 2.2 serum sodium 132 potassium 3.9.    ID consultation recommendations noted.  Pneumonia being ruled out.  Blood cultures positive for strep.  Will await ID recommendations to see if tunneled dialysis catheter needs to be removed.    Dinh Ceballos MD  05/07/22  11:24 EDT  Tel: [phone]  Fax: [phone]

## 2022-05-07 NOTE — PLAN OF CARE
Goal Outcome Evaluation:  Plan of Care Reviewed With: patient      Progress: no change   Patient has been grumpy, lethargic and confused some when starting my shift after he dialysis done in his room 3L was taken off he seems more alert and knows more of what is going on.  He was moved to a specialty bed is on 2L of O2. Is resting calmly with no issues noted at this time.

## 2022-05-07 NOTE — CONSULTS
Infectious Diseases Consult Note    Referring Provider: Sunny Shannon MD    Reason for Consultation: Positive blood culture    Patient Care Team:  Rudolph Rooney MD as PCP - General (Family Medicine)  Samantha Zabala APRN as PCP - Family Medicine  Jose G Rm MD as Consulting Physician (Cardiology)    Chief complaint shortness of breath    Subjective     History of present illness:     This is 71-year-old white male with past medical history significant for end-stage renal disease on hemodialysis.  Patient presented hospital on May 6, 2022 with the complaints of increasing shortness of breath.  Patient denied having productive cough.  The patient denied having fever.  There was 2 sets of blood cultures collected from the right arm but 2 different times positive for Streptococcus species.  PCR did not detect specific pathogen.  The patient is known to have right-sided chest tunneled dialysis catheter.  He also has left arm AV fistula.  The patient had no fever prior to admission.  The patient remained afebrile and hemodynamically stable.  The patient denied having any other complaints.    Review of Systems   Review of Systems   Constitutional: Negative.    HENT: Negative.    Eyes: Negative.    Respiratory: Positive for shortness of breath.    Cardiovascular: Negative.    Gastrointestinal: Negative.    Genitourinary: Negative.    Musculoskeletal: Negative.    Skin: Negative.    Neurological: Negative.    Hematological: Negative.    Psychiatric/Behavioral: Negative.        Medications  Medications Prior to Admission   Medication Sig Dispense Refill Last Dose   • albuterol sulfate  (90 Base) MCG/ACT inhaler Inhale 2 puffs Every 4 (Four) Hours.   5/6/2022 at Unknown time   • apixaban (ELIQUIS) 5 MG tablet tablet Take 1 tablet by mouth Every 12 (Twelve) Hours. Indications: Atrial Fibrillation 60 tablet  5/6/2022 at Unknown time   • atorvastatin (LIPITOR) 40 MG tablet Take 40 mg by mouth Every Night.    5/5/2022 at Unknown time   • budesonide (Pulmicort) 0.5 MG/2ML nebulizer solution Take 2 mL by nebulization 2 (Two) Times a Day.   5/6/2022 at Unknown time   • cholecalciferol (VITAMIN D3) 25 MCG (1000 UT) tablet Take 1,000 Units by mouth Daily.   5/6/2022 at Unknown time   • docusate sodium (COLACE) 100 MG capsule Take 100 mg by mouth Daily.   5/6/2022 at Unknown time   • donepezil (ARICEPT) 10 MG tablet Take 10 mg by mouth Every Night.   5/5/2022 at Unknown time   • fluticasone (FLONASE) 50 MCG/ACT nasal spray 2 sprays by Each Nare route 2 (Two) Times a Day.   5/6/2022 at Unknown time   • guaiFENesin (MUCINEX) 600 MG 12 hr tablet Take 600 mg by mouth 2 (Two) Times a Day.   5/6/2022 at Unknown time   • HYDROcodone-acetaminophen (NORCO) 5-325 MG per tablet Take 1 tablet by mouth 3 (Three) Times a Day.   5/6/2022 at Unknown time   • ipratropium-albuterol (DUO-NEB) 0.5-2.5 mg/3 ml nebulizer Take 3 mL by nebulization 3 (Three) Times a Day. 360 mL  5/6/2022 at Unknown time   • ipratropium-albuterol (DUO-NEB) 0.5-2.5 mg/3 ml nebulizer Take 3 mL by nebulization Every 4 (Four) Hours As Needed for Wheezing. 360 mL  5/6/2022 at Unknown time   • levothyroxine (SYNTHROID, LEVOTHROID) 50 MCG tablet Take 50 mcg by mouth Every Morning.   5/6/2022 at Unknown time   • Metoprolol Tartrate 37.5 MG tablet Take 37.5 mg by mouth 2 (Two) Times a Day. Hold for SBP <110 or HR < 60   5/6/2022 at Unknown time   • midodrine (PROAMATINE) 10 MG tablet Take 10 mg by mouth 3 (Three) Times a Day Before Meals. Hold for BP > 140/90   5/6/2022 at Unknown time   • omeprazole (priLOSEC) 20 MG capsule Take 20 mg by mouth Daily.   5/6/2022 at Unknown time   • polyethylene glycol (MiraLax) 17 g packet Take 17 g by mouth 2 (Two) Times a Day.   5/6/2022 at Unknown time   • sertraline (ZOLOFT) 50 MG tablet Take 50 mg by mouth Daily.   5/6/2022 at Unknown time   • vitamin B-12 (CYANOCOBALAMIN) 1000 MCG tablet Take 1,000 mcg by mouth Daily.   5/6/2022 at  Unknown time   • bisacodyl (DULCOLAX) 10 MG suppository Insert 10 mg into the rectum Daily As Needed for Constipation.   Unknown at Unknown time   • magnesium hydroxide (MILK OF MAGNESIA) 400 MG/5ML suspension Take 30 mL by mouth Daily As Needed for Constipation.   Unknown at Unknown time   • ondansetron (ZOFRAN) 4 MG tablet Take 4 mg by mouth Every 8 (Eight) Hours As Needed for Nausea or Vomiting.   Unknown at Unknown time   • phenylephrine-mineral oil-petrolatum (Preparation H) 0.25-14-74.9 % ointment hemorrhoidal ointment Insert 1 application into the rectum Daily As Needed.   Unknown at Unknown time       History  Past Medical History:   Diagnosis Date   • A-fib ([facility]) 8/3/2021   • Anemia    • Appetite loss    • Arthritis    • Brain bleed ([facility])    • Carpal tunnel syndrome on left    • CHF (congestive heart failure) ([facility])    • Chronic left-sided low back pain without sciatica 12/27/2017   • CKD (chronic kidney disease) stage 3, GFR 30-59 ml/min ([facility])    • Closed fracture of seventh thoracic vertebra ([facility]) 8/23/2020   • Cognitive communication deficit 12/1/2020   • COPD (chronic obstructive pulmonary disease) ([facility])    • Coronary artery disease    • COVID-19 2/19/2021   • Cytokine release syndrome, grade 1 5/24/2021   • Depression    • Diabetic neuropathy ([facility])    • Dialysis patient ([facility])     mon wed fri   • DM2 (diabetes mellitus, type 2) ([facility])    • GERD (gastroesophageal reflux disease)    • Hyperlipidemia    • Hypertension    • Hypogonadism in male    • Neuropathy    • Nonsustained ventricular tachycardia ([facility]) 7/23/2021   • Obesity    • Paroxysmal atrial fibrillation ([facility]) 12/1/2020   • Sleep apnea    • Stroke ([facility])    • Unsteady gait      Past Surgical History:   Procedure Laterality Date   • ANGIOPLASTY      X2   • APPENDECTOMY     • ARTERIOVENOUS FISTULA/SHUNT SURGERY Left 1/20/2022    Procedure: LEFT BRACHIAL CEPHALIC ARTERIAL VENOUS FISTULA CREATION;  Surgeon: Yony Davila MD;  Location: [facility]  MAIN OR;  Service: Vascular;  Laterality: Left;   • CARDIAC CATHETERIZATION  11/13/2015   • CARDIAC CATHETERIZATION N/A 5/24/2021    Procedure: Left Heart Cath and coronary angiogram;  Surgeon: Jose G Rm MD;  Location:  [facility];  Service: Cardiovascular;  Laterality: N/A;   • CARDIAC CATHETERIZATION  5/24/2021    Procedure: Saphenous Vein Graft;  Surgeon: Jose G Rm MD;  Location:  [facility];  Service: Cardiovascular;;   • CARDIAC CATHETERIZATION N/A 5/24/2021    Procedure: Percutaneous Coronary Intervention;  Surgeon: Bernabe Zambrano MD;  Location: [facility];  Service: Cardiology;  Laterality: N/A;   • CARDIAC CATHETERIZATION N/A 5/24/2021    Procedure: Stent NIELS coronary;  Surgeon: Bernabe Zambrano MD;  Location:  [facility];  Service: Cardiology;  Laterality: N/A;   • CARDIAC CATHETERIZATION N/A 5/26/2021    Procedure: Percutaneous Coronary Intervention;  Surgeon: Bernabe Zambrano MD;  Location:  [facility];  Service: Cardiovascular;  Laterality: N/A;   • CARDIAC CATHETERIZATION N/A 5/26/2021    Procedure: Stent NIELS bypass graft;  Surgeon: Bernabe Zambrano MD;  Location:  [facility];  Service: Cardiovascular;  Laterality: N/A;   • CARDIAC ELECTROPHYSIOLOGY PROCEDURE N/A 5/24/2021    Procedure: Temporary Pacemaker;  Surgeon: Bernabe Zambrano MD;  Location:  [facility];  Service: Cardiology;  Laterality: N/A;   • CARDIAC ELECTROPHYSIOLOGY PROCEDURE N/A 5/26/2021    Procedure: Temporary Pacemaker;  Surgeon: Bernabe Zambrano MD;  Location:  [facility];  Service: Cardiovascular;  Laterality: N/A;   • CARPAL TUNNEL RELEASE Left 04/29/2018    carpal tunnel- lt hand// other hand surgeries    • CATARACT EXTRACTION, BILATERAL  2002    Dr. Lux Acosta   • CHOLECYSTECTOMY     • COLON RESECTION  2005   • CORONARY ANGIOPLASTY     • CORONARY ANGIOPLASTY WITH STENT PLACEMENT   11/13/2015    PTCA stent to proximal in stent and mid to distal lad   • CORONARY ANGIOPLASTY WITH STENT PLACEMENT  09/16/2016    PTCA stent to mid lad and stent to vein graft to marginal   • CORONARY ARTERY BYPASS GRAFT  2005    @ [facility]   • CYST REMOVAL      cyst removed from scrotum   • FOOT SURGERY Right 07/17/2018   • FOOT SURGERY Left 02/04/2019   • PORTACATH PLACEMENT     • TOE AMPUTATION Right     right toe removed D/T infected cut that went to the bone       Family History  Family History   Problem Relation Age of Onset   • Heart disease Mother    • Heart disease Father    • Diabetes Sister    • Heart disease Sister    • Diabetes Brother    • Mental illness Brother        Social History   reports that he has quit smoking. His smoking use included cigarettes. He has a 60.00 pack-year smoking history. He has never used smokeless tobacco. He reports current drug use. Frequency: 1.00 time per week. Drug: Marijuana. He reports that he does not drink alcohol.    Allergies  Codeine    Objective     Vital Signs   Vital Signs (last 24 hours)       05/06 0700  05/07 0659 05/07 0700  05/07 1410   Most Recent      Temp (°F) 97.9 -  98.4       97.9 (36.6) 05/07 0515    Heart Rate 114 -  118       118 05/07 0515    Resp 18 -  24       18 05/07 0515    BP 96/60 -  129/62       96/60 05/07 0515    SpO2 (%) 94 -  100       96 05/07 0515          Physical Exam:  Physical Exam  Vitals and nursing note reviewed.   Constitutional:       Appearance: He is well-developed.   HENT:      Head: Normocephalic and atraumatic.   Eyes:      Pupils: Pupils are equal, round, and reactive to light.   Cardiovascular:      Rate and Rhythm: Normal rate and regular rhythm.      Heart sounds: Normal heart sounds.   Pulmonary:      Effort: Pulmonary effort is normal. No respiratory distress.      Breath sounds: Rales present. No wheezing.   Abdominal:      General: Bowel sounds are normal. There is no distension.      Palpations: Abdomen is soft.  There is no mass.      Tenderness: There is no abdominal tenderness. There is no guarding or rebound.   Musculoskeletal:         General: No deformity. Normal range of motion.      Cervical back: Normal range of motion and neck supple.      Comments: Left arm AV fistula was normal thrill   Skin:     General: Skin is warm.      Findings: No erythema or rash.   Neurological:      Mental Status: He is alert and oriented to person, place, and time.      Cranial Nerves: No cranial nerve deficit.         Microbiology  Microbiology Results (last 10 days)     Procedure Component Value - Date/Time    COVID PRE-OP / PRE-PROCEDURE SCREENING ORDER (NO ISOLATION) - Swab, Nasopharynx [[id number]]  (Normal) Collected: 05/06/22 1519    Lab Status: Final result Specimen: Swab from Nasopharynx Updated: 05/06/22 1542    Narrative:      The following orders were created for panel order COVID PRE-OP / PRE-PROCEDURE SCREENING ORDER (NO ISOLATION) - Swab, Nasopharynx.  Procedure                               Abnormality         Status                     ---------                               -----------         ------                     COVID-19,CEPHEID/ROHAN,CO...[[id number]]  Normal              Final result                 Please view results for these tests on the individual orders.    COVID-19,CEPHEID/ROHAN,COR/[facility]/PAD/BEATRICE IN-HOUSE(OR EMERGENT/ADD-ON),NP SWAB IN TRANSPORT MEDIA 3-4 HR TAT, RT-PCR - Swab, Nasopharynx [[id number]]  (Normal) Collected: 05/06/22 1519    Lab Status: Final result Specimen: Swab from Nasopharynx Updated: 05/06/22 1542     COVID19 Not Detected    Narrative:      Fact sheet for providers: https://www.fda.gov/media/867872/download     Fact sheet for patients: https://www.fda.gov/media/697181/download  Fact sheet for providers: https://www.fda.gov/media/857207/download    Fact sheet for patients: https://www.fda.gov/media/292470/download    Test performed by PCR.    Blood Culture - Blood, Hand, Right [[id number]]   (Abnormal) Collected: 05/06/22 1116    Lab Status: Preliminary result Specimen: Blood from Hand, Right Updated: 05/07/22 0216     Blood Culture Abnormal Stain     Gram Stain Anaerobic Bottle Gram positive cocci      Aerobic Bottle Gram positive cocci    Blood Culture ID, PCR - Blood, Hand, Right [[id number]]  (Abnormal) Collected: 05/06/22 1116    Lab Status: Final result Specimen: Blood from Hand, Right Updated: 05/07/22 0216     BCID, PCR Streptococcus spp, not A, B, or pneumonia. Identification by BCID2 PCR.     BOTTLE TYPE Anaerobic Bottle    Blood Culture - Blood, Arm, Right [[id number]]  (Abnormal) Collected: 05/06/22 1057    Lab Status: Preliminary result Specimen: Blood from Arm, Right Updated: 05/07/22 0217     Blood Culture Abnormal Stain     Gram Stain Anaerobic Bottle Gram positive cocci      Aerobic Bottle Gram positive cocci          Laboratory  Results from last 7 days   Lab Units 05/07/22 0417   WBC 10*3/mm3 6.70   HEMOGLOBIN g/dL 10.9*   HEMATOCRIT % 35.7*   PLATELETS 10*3/mm3 163     Results from last 7 days   Lab Units 05/07/22 0417   SODIUM mmol/L 134*   POTASSIUM mmol/L 3.9   CHLORIDE mmol/L 97*   CO2 mmol/L 26.0   BUN mg/dL 12   CREATININE mg/dL 2.24*   GLUCOSE mg/dL 164*   CALCIUM mg/dL 8.1*     Results from last 7 days   Lab Units 05/07/22 0417   SODIUM mmol/L 134*   POTASSIUM mmol/L 3.9   CHLORIDE mmol/L 97*   CO2 mmol/L 26.0   BUN mg/dL 12   CREATININE mg/dL 2.24*   GLUCOSE mg/dL 164*   CALCIUM mg/dL 8.1*                   Radiology  Imaging Results (Last 72 Hours)     Procedure Component Value Units Date/Time    XR Chest 1 View [[id number]] Collected: 05/06/22 1415     Updated: 05/06/22 1418    Narrative:      DATE OF EXAM:  5/6/2022 2:11 PM     PROCEDURE:  XR CHEST 1 VW-     INDICATIONS:  dyspnea       COMPARISON:  AP portable chest 4/8/2022     TECHNIQUE:   Single radiographic view of the chest was obtained.     FINDINGS:  Small right greater than left layering pleural effusions are  present.  Bibasilar airspace disease may represent passive atelectasis. Stable  cardiomegaly with CABG and coronary artery stent. Right IJ approach  central line tip remains at the cavoatrial junction. No visible  pneumothorax.       Impression:      Small right greater than left pleural effusions. Bibasilar airspace  disease, right greater than left, favored to represent atelectasis.     Electronically Signed By-Demetria Jolley MD On:5/6/2022 2:16 PM  This report was finalized on [id number] by  Demetria Jolley MD.          Cardiology      Results Review:  I have reviewed all clinical data, test, lab, and imaging results.       Schedule Meds  cefTRIAXone, 1 g, Intravenous, Once  [START ON 5/8/2022] cefTRIAXone, 2 g, Intravenous, Q24H  insulin lispro, 0-7 Units, Subcutaneous, TID AC  sodium chloride, 10 mL, Intravenous, Q12H        Infusion Meds       PRN Meds  •  acetaminophen **OR** acetaminophen **OR** acetaminophen  •  albumin human  •  aluminum-magnesium hydroxide-simethicone  •  dextrose  •  dextrose  •  glucagon (human recombinant)  •  insulin lispro **AND** insulin lispro  •  melatonin  •  ondansetron **OR** ondansetron  •  sodium chloride  •  sodium chloride      Assessment/Plan       Assessment    Positive blood culture for Streptococcus species in 2 out of 2 sets on admission.  Blood cultures are not finalized.  We need to rule out tunneled dialysis catheter infection    The patient presented hospital with shortness of breath and chest x-ray showed density at the right base.  Need to rule out pneumonia    End-stage renal disease on hemodialysis.  Patient had right chest tunneled dialysis catheter and left arm AV fistula    History of CVA    Plan    Continue ceftriaxone 2 g IV daily waiting on final blood culture results  Request dialysis nurse to obtain 1 set of blood culture from the tunneled dialysis catheter  Request 2D echo to rule out vegetation  Chest CT scan without contrast to rule out  pneumonia  Supportive care  A.m. labs    Radha Jack MD  05/07/22  14:10 EDT    Note is dictated utilizing voice recognition software/Dragon

## 2022-05-07 NOTE — PLAN OF CARE
Goal Outcome Evaluation:  Plan of Care Reviewed With: patient        Progress: improving  Outcome Evaluation: Patient rested well. Chest CT done. no complaint of pain or discomfort. will continue to monitor.

## 2022-05-07 NOTE — PROGRESS NOTES
[facility] Medicine Services Daily Progress Note    Patient Name: Benson Mclean  : 1950  MRN: [id number]  Primary Care Physician:  Rudolph Rooney MD  Date of admission: 2022      Subjective      Chief Complaint: Shortness of breath fluid overload.      Patient Reports that has been coughing a lot.  Feels generally weak.  States his wife is in the hospital as well.  Found to have bacteremia.  ID consulted.    ROS negative except as above      Objective      Vitals:   Temp:  [97.9 °F (36.6 °C)] 97.9 °F (36.6 °C)  Heart Rate:  [] 78  Resp:  [17-18] 17  BP: ()/(60-70) 101/70  Flow (L/min):  [2-3] 2    Physical Exam  Vitals reviewed.   Constitutional:       Comments: Chronically weak appearing  Frequent cough   HENT:      Head: Normocephalic.   Cardiovascular:      Rate and Rhythm: Normal rate.   Pulmonary:      Effort: Pulmonary effort is normal.   Abdominal:      General: Abdomen is flat.      Palpations: Abdomen is soft.   Musculoskeletal:         General: Normal range of motion.      Cervical back: Normal range of motion.   Skin:     General: Skin is warm.   Neurological:      General: No focal deficit present.      Mental Status: He is alert and oriented to person, place, and time.   Psychiatric:         Mood and Affect: Mood normal.         Behavior: Behavior normal.             Result Review    Result Review:  I have personally reviewed the results from the time of this admission to 2022 17:07 EDT and agree with these findings:  [x]  Laboratory  []  Microbiology  []  Radiology  []  EKG/Telemetry   []  Cardiology/Vascular   []  Pathology  []  Old records  []  Other:  Most notable findings include: Positive for strep in the blood    Wounds (last 24 hours)     LDA Wound     Row Name 22 0703 22 1905 22 1712       Wound 22 1706 Right heel    Wound - Properties Group Placement Date: 22  -MAGNO Placement Time: 1706 Present on  Hospital Admission: Y  -MAGNO Side: Right  -MAGNO Location: heel  -MAGNO    Dressing Appearance open to air  -KB -- --    Closure -- Open to air  -JW --    Base dry  -KB dry;red  -JW --    Drainage Amount -- none  -JW --    Retired Wound - Properties Group Placement Date: 05/06/22  -MAGNO Placement Time: 1706 -MAGNO Present on Hospital Admission: Y  -MAGNO Side: Right  -MAGNO Location: heel  -MAGNO    Retired Wound - Properties Group Date first assessed: 05/06/22  -MAGNO Time first assessed: 1706  -MAGNO Present on Hospital Admission: Y  -MAGNO Side: Right  -MAGNO Location: heel  -MAGNO       Wound 03/30/22 0343 Left heel    Wound - Properties Group Placement Date: 03/30/22  -AS Placement Time: 0343  -AS Present on Hospital Admission: Y  -AS Side: Left  -AS Location: heel  -AS    Retired Wound - Properties Group Placement Date: 03/30/22  -AS Placement Time: 0343  -AS Present on Hospital Admission: Y  -AS Side: Left  -AS Location: heel  -AS    Retired Wound - Properties Group Date first assessed: 03/30/22  -AS Time first assessed: 0343  -AS Present on Hospital Admission: Y  -AS Side: Left  -AS Location: heel  -AS       Wound 03/07/22 1805 Right anterior thigh Burn    Wound - Properties Group Placement Date: 03/07/22  -MG Placement Time: 1805  -MG Present on Hospital Admission: Y  -MG Side: Right  -MG Orientation: anterior  -MG Location: thigh  -MG Primary Wound Type: Burn  -KK    Retired Wound - Properties Group Placement Date: 03/07/22  -MG Placement Time: 1805  -MG Present on Hospital Admission: Y  -MG Side: Right  -MG Orientation: anterior  -MG Location: thigh  -MG Primary Wound Type: Burn  -KK    Retired Wound - Properties Group Date first assessed: 03/07/22  -MG Time first assessed: 1805  -MG Present on Hospital Admission: Y  -MG Side: Right  -MG Location: thigh  -MG Primary Wound Type: Burn  -KK       Wound 03/07/22 1806 Left anterior thigh Burn    Wound - Properties Group Placement Date: 03/07/22  -MG Placement Time: 1806  -MG Side: Left  -MG  Orientation: anterior  -MG Location: thigh  -MG Primary Wound Type: Burn  -KK    Retired Wound - Properties Group Placement Date: 03/07/22  -MG Placement Time: 1806  -MG Side: Left  -MG Orientation: anterior  -MG Location: thigh  -MG Primary Wound Type: Burn  -KK    Retired Wound - Properties Group Date first assessed: 03/07/22  -MG Time first assessed: 1806  -MG Side: Left  -MG Location: thigh  -MG Primary Wound Type: Burn  -KK       Wound 05/06/22 1710 Right anterior greater trochanter    Wound - Properties Group Placement Date: 05/06/22  -MAGNO Placement Time: 1710  -MAGNO Present on Hospital Admission: Y  -MAGNO Side: Right  -MAGNO Orientation: anterior  -MAGNO Location: greater trochanter  -MAGNO    Base non-blanchable;moist;pink  -KB -- --    Retired Wound - Properties Group Placement Date: 05/06/22  -MAGNO Placement Time: 1710  -MAGNO Present on Hospital Admission: Y  -MAGNO Side: Right  -MAGNO Orientation: anterior  -MAGNO Location: greater trochanter  -MAGNO    Retired Wound - Properties Group Date first assessed: 05/06/22  -MAGNO Time first assessed: 1710  -MAGNO Present on Hospital Admission: Y  -MAGNO Side: Right  -MAGNO Location: greater trochanter  -MAGNO       Wound 03/30/22 0341 Left posterior thigh    Wound - Properties Group Placement Date: 03/30/22  -AS Placement Time: 0341  -AS Present on Hospital Admission: Y  -AS Side: Left  -AS Orientation: posterior  -AS Location: thigh  -AS    Retired Wound - Properties Group Placement Date: 03/30/22  -AS Placement Time: 0341  -AS Present on Hospital Admission: Y  -AS Side: Left  -AS Orientation: posterior  -AS Location: thigh  -AS    Retired Wound - Properties Group Date first assessed: 03/30/22  -AS Time first assessed: 0341  -AS Present on Hospital Admission: Y  -AS Side: Left  -AS Location: thigh  -AS       Wound 03/30/22 0340 perineum Pressure Injury    Wound - Properties Group Placement Date: 03/30/22  -AS Placement Time: 0340  -AS Present on Hospital Admission: Y  -AS Location: perineum  -AS Primary  Wound Type: Pressure inj  -AS    Wound Image View All Images -- -- View Images  -MAGNO    Dressing Appearance -- -- open to air  -HJ    Closure -- -- Open to air  -HJ    Base non-blanchable;moist;pink  -KB -- red  -HJ    Drainage Amount -- -- none  -HJ    Care, Wound -- -- barrier applied  -HJ    Dressing Care -- -- skin barrier agent applied  -HJ    Retired Wound - Properties Group Placement Date: 03/30/22  -AS Placement Time: 0340  -AS Present on Hospital Admission: Y  -AS Location: perineum  -AS Primary Wound Type: Pressure inj  -AS    Retired Wound - Properties Group Date first assessed: 03/30/22  -AS Time first assessed: 0340  -AS Present on Hospital Admission: Y  -AS Location: perineum  -AS Primary Wound Type: Pressure inj  -AS       Wound 01/20/22 1024 Left distal arm Incision    Wound - Properties Group Placement Date: 01/20/22  -EP Placement Time: 1024  -EP Present on Hospital Admission: N  -EP Side: Left  -EP Orientation: distal  -EP Location: arm  -EP Primary Wound Type: Incision  -EP    Retired Wound - Properties Group Placement Date: 01/20/22  -EP Placement Time: 1024  -EP Present on Hospital Admission: N  -EP Side: Left  -EP Orientation: distal  -EP Location: arm  -EP Primary Wound Type: Incision  -EP    Retired Wound - Properties Group Date first assessed: 01/20/22  -EP Time first assessed: 1024  -EP Present on Hospital Admission: N  -EP Side: Left  -EP Location: arm  -EP Primary Wound Type: Incision  -EP       Wound 03/30/22 0342 Right popliteal    Wound - Properties Group Placement Date: 03/30/22  -AS Placement Time: 0342  -AS Present on Hospital Admission: Y  -AS Side: Right  -AS Location: popliteal  -AS    Dressing Appearance open to air  -KB -- --    Retired Wound - Properties Group Placement Date: 03/30/22  -AS Placement Time: 0342  -AS Present on Hospital Admission: Y  -AS Side: Right  -AS Location: popliteal  -AS    Retired Wound - Properties Group Date first assessed: 03/30/22  -AS Time first  assessed: 0342  -AS Present on Hospital Admission: Y  -AS Side: Right  -AS Location: popliteal  -AS       Wound 03/30/22 0348 Right anterior ankle    Wound - Properties Group Placement Date: 03/30/22  -AS Placement Time: 0348  -AS Present on Hospital Admission: Y  -AS Side: Right  -AS Orientation: anterior  -AS Location: ankle  -AS    Retired Wound - Properties Group Placement Date: 03/30/22  -AS Placement Time: 0348  -AS Present on Hospital Admission: Y  -AS Side: Right  -AS Orientation: anterior  -AS Location: ankle  -AS    Retired Wound - Properties Group Date first assessed: 03/30/22  -AS Time first assessed: 0348  -AS Present on Hospital Admission: Y  -AS Side: Right  -AS Location: ankle  -AS       Wound 03/30/22 0349 Left antecubital    Wound - Properties Group Placement Date: 03/30/22  -AS Placement Time: 0349  -AS Present on Hospital Admission: Y  -AS Side: Left  -AS Location: antecubital  -AS    Retired Wound - Properties Group Placement Date: 03/30/22  -AS Placement Time: 0349  -AS Present on Hospital Admission: Y  -AS Side: Left  -AS Location: antecubital  -AS    Retired Wound - Properties Group Date first assessed: 03/30/22  -AS Time first assessed: 0349  -AS Present on Hospital Admission: Y  -AS Side: Left  -AS Location: antecubital  -AS       Wound 04/09/22 1201 Right posterior elbow Skin Tear    Wound - Properties Group Placement Date: 04/09/22  -AD Placement Time: 1201  -AD Present on Hospital Admission: N  -AD Side: Right  -AD Orientation: posterior  -AD Location: elbow  -AD Primary Wound Type: Skin tear  -AD    Retired Wound - Properties Group Placement Date: 04/09/22  -AD Placement Time: 1201  -AD Present on Hospital Admission: N  -AD Side: Right  -AD Orientation: posterior  -AD Location: elbow  -AD Primary Wound Type: Skin tear  -AD    Retired Wound - Properties Group Date first assessed: 04/09/22  -AD Time first assessed: 1201  -AD Present on Hospital Admission: N  -AD Side: Right  -AD Location:  elbow  -AD Primary Wound Type: Skin tear  -AD    Row Name 05/06/22 1711 05/06/22 1710 05/06/22 1709       Wound 05/06/22 1706 Right heel    Wound - Properties Group Placement Date: 05/06/22  -MAGNO Placement Time: 1706  -MAGNO Present on Hospital Admission: Y  -MAGNO Side: Right  -MAGNO Location: heel  -MAGNO    Retired Wound - Properties Group Placement Date: 05/06/22  -MAGNO Placement Time: 1706  -MAGNO Present on Hospital Admission: Y  -MAGNO Side: Right  -MAGNO Location: heel  -MAGNO    Retired Wound - Properties Group Date first assessed: 05/06/22  -MAGNO Time first assessed: 1706  -MAGNO Present on Hospital Admission: Y  -MAGNO Side: Right  -MAGNO Location: heel  -MAGNO       Wound 03/30/22 0343 Left heel    Wound - Properties Group Placement Date: 03/30/22  -AS Placement Time: 0343  -AS Present on Hospital Admission: Y  -AS Side: Left  -AS Location: heel  -AS    Retired Wound - Properties Group Placement Date: 03/30/22  -AS Placement Time: 0343  -AS Present on Hospital Admission: Y  -AS Side: Left  -AS Location: heel  -AS    Retired Wound - Properties Group Date first assessed: 03/30/22  -AS Time first assessed: 0343  -AS Present on Hospital Admission: Y  -AS Side: Left  -AS Location: heel  -AS       Wound 03/07/22 1805 Right anterior thigh Burn    Wound - Properties Group Placement Date: 03/07/22  -MG Placement Time: 1805  -MG Present on Hospital Admission: Y  -MG Side: Right  -MG Orientation: anterior  -MG Location: thigh  -MG Primary Wound Type: Burn  -KK    Wound Image View All Images -- -- View Images  -MAGNO    Dressing Appearance -- -- open to air  -HJ    Closure -- -- Open to air  -HJ    Base -- -- scab  -HJ    Dressing Care -- -- open to air  -HJ    Retired Wound - Properties Group Placement Date: 03/07/22  -MG Placement Time: 1805  -MG Present on Hospital Admission: Y  -MG Side: Right  -MG Orientation: anterior  -MG Location: thigh  -MG Primary Wound Type: Burn  -KK    Retired Wound - Properties Group Date first assessed: 03/07/22  -MG Time first  assessed: 1805  -MG Present on Hospital Admission: Y  -MG Side: Right  -MG Location: thigh  -MG Primary Wound Type: Burn  -KK       Wound 03/07/22 1806 Left anterior thigh Burn    Wound - Properties Group Placement Date: 03/07/22  -MG Placement Time: 1806  -MG Side: Left  -MG Orientation: anterior  -MG Location: thigh  -MG Primary Wound Type: Burn  -KK    Wound Image View All Images -- -- View Images  -MAGNO    Dressing Appearance -- -- open to air  -HJ    Closure -- -- Open to air  -HJ    Base -- -- scab  -HJ    Dressing Care -- -- open to air  -HJ    Retired Wound - Properties Group Placement Date: 03/07/22  -MG Placement Time: 1806  -MG Side: Left  -MG Orientation: anterior  -MG Location: thigh  -MG Primary Wound Type: Burn  -KK    Retired Wound - Properties Group Date first assessed: 03/07/22  -MG Time first assessed: 1806  -MG Side: Left  -MG Location: thigh  -MG Primary Wound Type: Burn  -KK       Wound 05/06/22 1710 Right anterior greater trochanter    Wound - Properties Group Placement Date: 05/06/22  -MAGNO Placement Time: 1710  -MAGNO Present on Hospital Admission: Y  -MAGNO Side: Right  -MAGNO Orientation: anterior  -MAGNO Location: greater trochanter  -MAGNO    Wound Image View All Images -- View Images  -MAGNO --    Pressure Injury Stage -- 3  -HJ --    Dressing Appearance -- open to air  -HJ --    Closure -- Open to air  -HJ --    Base -- moist;pink;red  -HJ --    Drainage Amount -- none  -HJ --    Care, Wound -- barrier applied  -HJ --    Dressing Care -- skin barrier agent applied  -HJ --    Retired Wound - Properties Group Placement Date: 05/06/22  -MAGNO Placement Time: 1710  -MAGNO Present on Hospital Admission: Y  -MAGNO Side: Right  -MAGNO Orientation: anterior  -MAGNO Location: greater trochanter  -MAGNO    Retired Wound - Properties Group Date first assessed: 05/06/22  -MAGNO Time first assessed: 1710  -MAGNO Present on Hospital Admission: Y  -MAGNO Side: Right  -MAGNO Location: greater trochanter  -MAGNO       Wound 03/30/22 0341 Left posterior  thigh    Wound - Properties Group Placement Date: 03/30/22  -AS Placement Time: 0341  -AS Present on Hospital Admission: Y  -AS Side: Left  -AS Orientation: posterior  -AS Location: thigh  -AS    Wound Image View All Images View Images  -MAGNO -- --    Pressure Injury Stage 3  -HJ -- --    Dressing Appearance open to air  -HJ -- --    Closure Open to air  -HJ -- --    Drainage Amount none  -HJ -- --    Dressing Care skin barrier agent applied  -HJ -- --    Retired Wound - Properties Group Placement Date: 03/30/22  -AS Placement Time: 0341  -AS Present on Hospital Admission: Y  -AS Side: Left  -AS Orientation: posterior  -AS Location: thigh  -AS    Retired Wound - Properties Group Date first assessed: 03/30/22  -AS Time first assessed: 0341  -AS Present on Hospital Admission: Y  -AS Side: Left  -AS Location: thigh  -AS       Wound 03/30/22 0340 perineum Pressure Injury    Wound - Properties Group Placement Date: 03/30/22  -AS Placement Time: 0340  -AS Present on Hospital Admission: Y  -AS Location: perineum  -AS Primary Wound Type: Pressure inj  -AS    Retired Wound - Properties Group Placement Date: 03/30/22  -AS Placement Time: 0340  -AS Present on Hospital Admission: Y  -AS Location: perineum  -AS Primary Wound Type: Pressure inj  -AS    Retired Wound - Properties Group Date first assessed: 03/30/22  -AS Time first assessed: 0340  -AS Present on Hospital Admission: Y  -AS Location: perineum  -AS Primary Wound Type: Pressure inj  -AS       Wound 01/20/22 1024 Left distal arm Incision    Wound - Properties Group Placement Date: 01/20/22  -EP Placement Time: 1024  -EP Present on Hospital Admission: N  -EP Side: Left  -EP Orientation: distal  -EP Location: arm  -EP Primary Wound Type: Incision  -EP    Retired Wound - Properties Group Placement Date: 01/20/22  -EP Placement Time: 1024  -EP Present on Hospital Admission: N  -EP Side: Left  -EP Orientation: distal  -EP Location: arm  -EP Primary Wound Type: Incision  -EP     Retired Wound - Properties Group Date first assessed: 01/20/22  -EP Time first assessed: 1024  -EP Present on Hospital Admission: N  -EP Side: Left  -EP Location: arm  -EP Primary Wound Type: Incision  -EP       Wound 03/30/22 0342 Right popliteal    Wound - Properties Group Placement Date: 03/30/22  -AS Placement Time: 0342  -AS Present on Hospital Admission: Y  -AS Side: Right  -AS Location: popliteal  -AS    Retired Wound - Properties Group Placement Date: 03/30/22  -AS Placement Time: 0342  -AS Present on Hospital Admission: Y  -AS Side: Right  -AS Location: popliteal  -AS    Retired Wound - Properties Group Date first assessed: 03/30/22  -AS Time first assessed: 0342  -AS Present on Hospital Admission: Y  -AS Side: Right  -AS Location: popliteal  -AS       Wound 03/30/22 0348 Right anterior ankle    Wound - Properties Group Placement Date: 03/30/22  -AS Placement Time: 0348  -AS Present on Hospital Admission: Y  -AS Side: Right  -AS Orientation: anterior  -AS Location: ankle  -AS    Retired Wound - Properties Group Placement Date: 03/30/22  -AS Placement Time: 0348  -AS Present on Hospital Admission: Y  -AS Side: Right  -AS Orientation: anterior  -AS Location: ankle  -AS    Retired Wound - Properties Group Date first assessed: 03/30/22  -AS Time first assessed: 0348  -AS Present on Hospital Admission: Y  -AS Side: Right  -AS Location: ankle  -AS       Wound 03/30/22 0349 Left antecubital    Wound - Properties Group Placement Date: 03/30/22  -AS Placement Time: 0349  -AS Present on Hospital Admission: Y  -AS Side: Left  -AS Location: antecubital  -AS    Retired Wound - Properties Group Placement Date: 03/30/22  -AS Placement Time: 0349  -AS Present on Hospital Admission: Y  -AS Side: Left  -AS Location: antecubital  -AS    Retired Wound - Properties Group Date first assessed: 03/30/22  -AS Time first assessed: 0349  -AS Present on Hospital Admission: Y  -AS Side: Left  -AS Location: antecubital  -AS       Wound  04/09/22 1201 Right posterior elbow Skin Tear    Wound - Properties Group Placement Date: 04/09/22  -AD Placement Time: 1201  -AD Present on Hospital Admission: N  -AD Side: Right  -AD Orientation: posterior  -AD Location: elbow  -AD Primary Wound Type: Skin tear  -AD    Retired Wound - Properties Group Placement Date: 04/09/22  -AD Placement Time: 1201  -AD Present on Hospital Admission: N  -AD Side: Right  -AD Orientation: posterior  -AD Location: elbow  -AD Primary Wound Type: Skin tear  -AD    Retired Wound - Properties Group Date first assessed: 04/09/22  -AD Time first assessed: 1201  -AD Present on Hospital Admission: N  -AD Side: Right  -AD Location: elbow  -AD Primary Wound Type: Skin tear  -AD    Row Name 05/06/22 1708             Wound 05/06/22 1706 Right heel    Wound - Properties Group Placement Date: 05/06/22  -MAGNO Placement Time: 1706  -MAGNO Present on Hospital Admission: Y  -MAGNO Side: Right  -MAGNO Location: heel  -MAGNO      Retired Wound - Properties Group Placement Date: 05/06/22  -MAGNO Placement Time: 1706  -MAGNO Present on Hospital Admission: Y  -MAGNO Side: Right  -MAGNO Location: heel  -MAGNO      Retired Wound - Properties Group Date first assessed: 05/06/22  -MAGNO Time first assessed: 1706  -MAGNO Present on Hospital Admission: Y  -MAGNO Side: Right  -MAGNO Location: heel  -MAGNO              Wound 03/30/22 0343 Left heel    Wound - Properties Group Placement Date: 03/30/22  -AS Placement Time: 0343  -AS Present on Hospital Admission: Y  -AS Side: Left  -AS Location: heel  -AS      Wound Image View All Images View Images  -MAGNO      Pressure Injury Stage U  -HJ      Dressing Appearance open to air  -HJ      Closure Open to air  -HJ      Base dry  -HJ      Dressing Care open to air  -HJ      Retired Wound - Properties Group Placement Date: 03/30/22  -AS Placement Time: 0343  -AS Present on Hospital Admission: Y  -AS Side: Left  -AS Location: heel  -AS      Retired Wound - Properties Group Date first assessed: 03/30/22  -AS Time  first assessed: 0343  -AS Present on Hospital Admission: Y  -AS Side: Left  -AS Location: heel  -AS              Wound 03/07/22 1805 Right anterior thigh Burn    Wound - Properties Group Placement Date: 03/07/22  -MG Placement Time: 1805  -MG Present on Hospital Admission: Y  -MG Side: Right  -MG Orientation: anterior  -MG Location: thigh  -MG Primary Wound Type: Burn  -KK      Retired Wound - Properties Group Placement Date: 03/07/22  -MG Placement Time: 1805  -MG Present on Hospital Admission: Y  -MG Side: Right  -MG Orientation: anterior  -MG Location: thigh  -MG Primary Wound Type: Burn  -KK      Retired Wound - Properties Group Date first assessed: 03/07/22  -MG Time first assessed: 1805  -MG Present on Hospital Admission: Y  -MG Side: Right  -MG Location: thigh  -MG Primary Wound Type: Burn  -KK              Wound 03/07/22 1806 Left anterior thigh Burn    Wound - Properties Group Placement Date: 03/07/22  -MG Placement Time: 1806  -MG Side: Left  -MG Orientation: anterior  -MG Location: thigh  -MG Primary Wound Type: Burn  -KK      Retired Wound - Properties Group Placement Date: 03/07/22  -MG Placement Time: 1806  -MG Side: Left  -MG Orientation: anterior  -MG Location: thigh  -MG Primary Wound Type: Burn  -KK      Retired Wound - Properties Group Date first assessed: 03/07/22  -MG Time first assessed: 1806  -MG Side: Left  -MG Location: thigh  -MG Primary Wound Type: Burn  -KK              Wound 03/30/22 0341 Left posterior thigh    Wound - Properties Group Placement Date: 03/30/22  -AS Placement Time: 0341  -AS Present on Hospital Admission: Y  -AS Side: Left  -AS Orientation: posterior  -AS Location: thigh  -AS      Retired Wound - Properties Group Placement Date: 03/30/22  -AS Placement Time: 0341  -AS Present on Hospital Admission: Y  -AS Side: Left  -AS Orientation: posterior  -AS Location: thigh  -AS      Retired Wound - Properties Group Date first assessed: 03/30/22  -AS Time first assessed: 0341  -AS  Present on Hospital Admission: Y  -AS Side: Left  -AS Location: thigh  -AS              Wound 03/30/22 0340 perineum Pressure Injury    Wound - Properties Group Placement Date: 03/30/22  -AS Placement Time: 0340  -AS Present on Hospital Admission: Y  -AS Location: perineum  -AS Primary Wound Type: Pressure inj  -AS      Retired Wound - Properties Group Placement Date: 03/30/22  -AS Placement Time: 0340  -AS Present on Hospital Admission: Y  -AS Location: perineum  -AS Primary Wound Type: Pressure inj  -AS      Retired Wound - Properties Group Date first assessed: 03/30/22  -AS Time first assessed: 0340  -AS Present on Hospital Admission: Y  -AS Location: perineum  -AS Primary Wound Type: Pressure inj  -AS              Wound 01/20/22 1024 Left distal arm Incision    Wound - Properties Group Placement Date: 01/20/22  -EP Placement Time: 1024  -EP Present on Hospital Admission: N  -EP Side: Left  -EP Orientation: distal  -EP Location: arm  -EP Primary Wound Type: Incision  -EP      Retired Wound - Properties Group Placement Date: 01/20/22  -EP Placement Time: 1024  -EP Present on Hospital Admission: N  -EP Side: Left  -EP Orientation: distal  -EP Location: arm  -EP Primary Wound Type: Incision  -EP      Retired Wound - Properties Group Date first assessed: 01/20/22  -EP Time first assessed: 1024  -EP Present on Hospital Admission: N  -EP Side: Left  -EP Location: arm  -EP Primary Wound Type: Incision  -EP              Wound 03/30/22 0342 Right popliteal    Wound - Properties Group Placement Date: 03/30/22  -AS Placement Time: 0342  -AS Present on Hospital Admission: Y  -AS Side: Right  -AS Location: popliteal  -AS      Retired Wound - Properties Group Placement Date: 03/30/22  -AS Placement Time: 0342  -AS Present on Hospital Admission: Y  -AS Side: Right  -AS Location: popliteal  -AS      Retired Wound - Properties Group Date first assessed: 03/30/22  -AS Time first assessed: 0342  -AS Present on Hospital Admission: Y   -AS Side: Right  -AS Location: popliteal  -AS              Wound 03/30/22 0348 Right anterior ankle    Wound - Properties Group Placement Date: 03/30/22  -AS Placement Time: 0348  -AS Present on Hospital Admission: Y  -AS Side: Right  -AS Orientation: anterior  -AS Location: ankle  -AS      Retired Wound - Properties Group Placement Date: 03/30/22  -AS Placement Time: 0348  -AS Present on Hospital Admission: Y  -AS Side: Right  -AS Orientation: anterior  -AS Location: ankle  -AS      Retired Wound - [employer] Group Date first assessed: 03/30/22  -AS Time first assessed: 0348  -AS Present on Hospital Admission: Y  -AS Side: Right  -AS Location: ankle  -AS              Wound 03/30/22 0349 Left antecubital    Wound - Properties Group Placement Date: 03/30/22  -AS Placement Time: 0349  -AS Present on Hospital Admission: Y  -AS Side: Left  -AS Location: antecubital  -AS      Retired Wound - Properties Group Placement Date: 03/30/22  -AS Placement Time: 0349  -AS Present on Hospital Admission: Y  -AS Side: Left  -AS Location: antecubital  -AS      Retired Wound - Properties Group Date first assessed: 03/30/22  -AS Time first assessed: 0349  -AS Present on Hospital Admission: Y  -AS Side: Left  -AS Location: antecubital  -AS              Wound 04/09/22 1201 Right posterior elbow Skin Tear    Wound - Properties Group Placement Date: 04/09/22  -AD Placement Time: 1201  -AD Present on Hospital Admission: N  -AD Side: Right  -AD Orientation: posterior  -AD Location: elbow  -AD Primary Wound Type: Skin tear  -AD      Retired Wound - Properties Group Placement Date: 04/09/22  -AD Placement Time: 1201  -AD Present on Hospital Admission: N  -AD Side: Right  -AD Orientation: posterior  -AD Location: elbow  -AD Primary Wound Type: Skin tear  -AD      Retired Wound - Properties Group Date first assessed: 04/09/22  -AD Time first assessed: 1201  -AD Present on Hospital Admission: N  -AD Side: Right  -AD Location: elbow  -AD Primary  Wound Type: Skin tear  -AD            User Key  (r) = Recorded By, (t) = Taken By, (c) = Cosigned By    Initials Name Provider Type    Ariela Jimenez, RN Registered Nurse    Zainab Griffiths RN Registered Nurse    Ashia Castaneda, RN Registered Nurse    Rayne Renae, RN Registered Nurse    JW Maranda Suresh, LPN Licensed Nurse    Frida Fallon RN Registered Nurse    Radha Quispe RN Registered Nurse    AS Maricruz Mi, RN Registered Nurse    Noy Torres RN Registered Nurse                   Assessment/Plan    71-year-old gentleman with fluid overload and bacteremia     Active Hospital Problems:       Active Hospital Problems     Diagnosis     • **Fluid overload     • Chronic respiratory failure with hypoxia, on home oxygen therapy (HCC)     • Elevated troponin     • Hypothyroidism (acquired)     • End stage renal disease (HCC)     • Anemia of renal disease     • Mixed hyperlipidemia     • Major depressive disorder, recurrent, mild (HCC)     • Flaccid hemiplegia of right dominant side as late effect of cerebral infarction ([facility])     • COPD with acute exacerbation ([facility])     • S/P CABG (coronary artery bypass graft)     • Essential hypertension     • ANNETTE treated with BiPAP        Plan:      Shortness of breath secondary to fluid overload  Chronic respiratory failure with oxygen dependence   -CXR: small right greater than left pleural effusions.  Bibasilar airspace disease, right greater than left, favored to represent atelectasis.   -proBNP >70,000 (previous proBNP march 2022 was also >70,000)  -pt on his home rate of 3L O2 via [place]  -pt did not complete dialysis  -nephrology consulted with plans for stat dialysis appreciate assistance    Bacteremia with Streptococcus  ID consulted  IV antibiotics ordered     Chronically elevated troponin  -troponin 0.33 compared to troponin 0.146 on 3/12/2022.   -Will trend      ESRD on HD MWF  -on midodrine for BP support  -nephrology to  manage     CVA with right sided residual weakness  -on eliquis, statin     CAD s/p CABG, PCI  Chronic atrial fibrillation  Hyperlipidemia  -chronic, controlled.   -continue Eliquis, atorvastatin, and metoprolol     DM type 2  -hemoglobin A1c 6.3 on 3/12/2022 and previous admission basal insulin stopped due to hypoglycemia  -SSI AC/HS     Hypothyroidism  -Continue home synthroid     Major depressive disorder, recurrent, mild   Dementia  -Continue home zoloft, aricept     ANNETTE  -cpap qhs      Obesity (BMI 30-39.9)  BMI 36.92  -Lifestyle modifications to reduce cardiovascular risk factors        DVT prophylaxis: eliquis      CODE STATUS:    Admission Status:  I believe this patient meets observation status.     I discussed the patient's findings and my recommendations with patient.     This patient has been examined wearing appropriate Personal Protective Equipment.    05/07/22      Electronically signed by Sunny Shannon MD, 05/07/22, 17:07 EDT.  [facility] Hospitalist Team

## 2022-05-08 NOTE — PROGRESS NOTES
Infectious Diseases Progress Note      LOS: 0 days   Patient Care Team:  Rudolph Rooney MD as PCP - General (Family Medicine)  Samantha Zabala APRN as PCP - Family Medicine  Jose G Rm MD as Consulting Physician (Cardiology)    Chief Complaint: Shortness of breath    Subjective       The patient has been afebrile for the last 24 hours.  The patient is on 2 L of oxygen by nasal cannula, hemodynamically stable, and is tolerating antimicrobial therapy.      Review of Systems:   Review of Systems   Constitutional: Positive for fatigue.   HENT: Negative.    Eyes: Negative.    Respiratory: Positive for shortness of breath.    Cardiovascular: Negative.    Gastrointestinal: Negative.    Endocrine: Negative.    Genitourinary: Negative.    Musculoskeletal: Negative.    Skin: Negative.    Neurological: Negative.    Psychiatric/Behavioral: Negative.    All other systems reviewed and are negative.       Objective     Vital Signs  Temp:  [97.6 °F (36.4 °C)-98.2 °F (36.8 °C)] 98.2 °F (36.8 °C)  Heart Rate:  [] 59  Resp:  [17-18] 18  BP: (101-122)/(67-79) 103/67    Physical Exam:  Physical Exam  Vitals and nursing note reviewed.   Constitutional:       General: He is not in acute distress.     Appearance: Normal appearance. He is well-developed and normal weight. He is not diaphoretic.   HENT:      Head: Normocephalic and atraumatic.   Eyes:      Conjunctiva/sclera: Conjunctivae normal.      Pupils: Pupils are equal, round, and reactive to light.   Cardiovascular:      Rate and Rhythm: Normal rate and regular rhythm.      Heart sounds: Normal heart sounds, S1 normal and S2 normal.   Pulmonary:      Effort: Pulmonary effort is normal. No respiratory distress.      Breath sounds: No stridor. Rales present. No wheezing.   Abdominal:      General: Bowel sounds are normal. There is no distension.      Palpations: Abdomen is soft. There is no mass.      Tenderness: There is no abdominal tenderness. There is no guarding.    Musculoskeletal:         General: No deformity. Normal range of motion.      Cervical back: Neck supple.   Skin:     General: Skin is warm and dry.      Coloration: Skin is not pale.      Findings: No erythema or rash.   Neurological:      Mental Status: He is alert and oriented to person, place, and time.      Cranial Nerves: No cranial nerve deficit.   Psychiatric:         Mood and Affect: Mood normal.          Results Review:    I have reviewed all clinical data, test, lab, and imaging results.     Radiology  CT Chest Without Contrast Diagnostic    Result Date: 5/7/2022  Examination: CT CHEST WO CONTRAST DIAGNOSTIC-  Date of Exam: 5/7/2022 5:14 PM  Indication: Possible pneumonia; N18.6-End stage renal disease; Z99.2-Dependence on renal dialysis; R06.00-Dyspnea, unspecified; Z91.15-Patient's noncompliance with renal dialysis; P82-Iufxsmi effusion, not elsewhere classified.  Comparison: Correlation with chest radiograph 05/06/2022.  Technique: Non-contrast axial volumetric CT imaging of the chest was performed. Automated exposure control and iterative reconstruction methods were used.  Findings: There is moderate to large right and small-to-moderate left pleural effusion. There is complete atelectasis of the right lower lobe and atelectasis of approximately 60% of the left lower lobe. There is partial atelectasis of both upper lobes and the right middle lobe. Overall, aerated lung occupies approximately 40% of the thoracic cavity. There is a 7 mm nodule in the subpleural anterior left upper lobe on axial image 36. There is no pneumothorax. Central airways are patent. There is a small amount of mucoid debris in the right posterior trachea.  Thyroid, remainder of the trachea and esophagus appear within normal limits. There is severe native coronary artery calcification. The patient appears status post median sternotomy and CABG. The heart is enlarged. There is diminished attenuation of the blood pool suggesting  anemia. The main pulmonary artery is enlarged up to 42 mm suggesting increased right heart pressure. No pericardial effusion. No pathologic mediastinal adenopathy.  There is skin thickening and edema in the midline upper back area there is marked diffuse muscular atrophy. There is a small volume of perihepatic ascites. There is a left kidney cyst at the inferior pole measuring 28 mm. There is an exophytic simple right kidney cyst measuring 24 mm. The patient is status post cholecystectomy. There is mild bilateral gynecomastia. There is no acute osseous abnormality or destructive bone lesion. The bones appear demineralized. There are mild degenerative changes.       1. Moderate to large right and small-to-moderate left pleural effusions with significant passive atelectasis. Aerated lung occupies approximately 40% of the anatomic lung space. 2. Enlarged main pulmonary artery suggesting increased right heart pressure. 3. Coronary artery disease status post CABG. 5. Marked atrophy of the musculature diffusely. 6. Gynecomastia.  Electronically Signed By-Rigoberto Hill MD On:5/7/2022 5:45 PM This report was finalized on [id number] by  Rigoberto Hill MD.      Cardiology    Laboratory    Results from last 7 days   Lab Units 05/08/22  0446 05/07/22 0417 05/06/22  1045   WBC 10*3/mm3 6.10 6.70 5.20   HEMOGLOBIN g/dL 10.9* 10.9* 11.0*   HEMATOCRIT % 35.8* 35.7* 35.8*   PLATELETS 10*3/mm3 173 163 203     Results from last 7 days   Lab Units 05/08/22 0446 05/07/22 0417 05/06/22  1045   SODIUM mmol/L 133* 134* 138   POTASSIUM mmol/L 4.0 3.9 3.8   CHLORIDE mmol/L 97* 97* 95*   CO2 mmol/L 25.0 26.0 30.0*   BUN mg/dL 19 12 15   CREATININE mg/dL 2.94* 2.24* 2.74*   GLUCOSE mg/dL 116* 164* 99   ALBUMIN g/dL 2.60*  --  3.10*   BILIRUBIN mg/dL 0.4  --  0.7   ALK PHOS U/L 52  --  59   AST (SGOT) U/L 12  --  11   ALT (SGPT) U/L 5  --  5   CALCIUM mg/dL 8.1* 8.1* 8.1*                 Microbiology   Microbiology Results (last 10  days)     Procedure Component Value - Date/Time    CANDIDA AURIS SCREEN - Swab, Axilla Right, Axilla Left and Groin [[id number]]  (Normal) Collected: 05/07/22 1020    Lab Status: Preliminary result Specimen: Swab from Axilla Right, Axilla Left and Groin Updated: 05/08/22 1032     Candida Auris Screen Culture No Candida auris isolated at 24 hours    COVID PRE-OP / PRE-PROCEDURE SCREENING ORDER (NO ISOLATION) - Swab, Nasopharynx [[id number]]  (Normal) Collected: 05/06/22 1519    Lab Status: Final result Specimen: Swab from Nasopharynx Updated: 05/06/22 1542    Narrative:      The following orders were created for panel order COVID PRE-OP / PRE-PROCEDURE SCREENING ORDER (NO ISOLATION) - Swab, Nasopharynx.  Procedure                               Abnormality         Status                     ---------                               -----------         ------                     COVID-19,CEPHEID/ROHAN,CO...[[id number]]  Normal              Final result                 Please view results for these tests on the individual orders.    COVID-19,CEPHEID/ROHAN,COR/ZEYNEP/PAD/BEATRICE IN-HOUSE(OR EMERGENT/ADD-ON),NP SWAB IN TRANSPORT MEDIA 3-4 HR TAT, RT-PCR - Swab, Nasopharynx [[id number]]  (Normal) Collected: 05/06/22 1519    Lab Status: Final result Specimen: Swab from Nasopharynx Updated: 05/06/22 1542     COVID19 Not Detected    Narrative:      Fact sheet for providers: https://www.fda.gov/media/299841/download     Fact sheet for patients: https://www.fda.gov/media/569514/download  Fact sheet for providers: https://www.fda.gov/media/344233/download    Fact sheet for patients: https://www.fda.gov/media/547077/download    Test performed by PCR.    Blood Culture - Blood, Hand, Right [[id number]]  (Abnormal) Collected: 05/06/22 1116    Lab Status: Preliminary result Specimen: Blood from Hand, Right Updated: 05/08/22 0601     Blood Culture Gram Positive Cocci     Isolated from Aerobic and Anaerobic Bottles     Gram Stain Anaerobic Bottle Gram  positive cocci      Aerobic Bottle Gram positive cocci    Blood Culture ID, PCR - Blood, Hand, Right [[id number]]  (Abnormal) Collected: 05/06/22 1116    Lab Status: Final result Specimen: Blood from Hand, Right Updated: 05/07/22 0216     BCID, PCR Streptococcus spp, not A, B, or pneumonia. Identification by BCID2 PCR.     BOTTLE TYPE Anaerobic Bottle    Blood Culture - Blood, Arm, Right [[id number]]  (Abnormal) Collected: 05/06/22 1057    Lab Status: Preliminary result Specimen: Blood from Arm, Right Updated: 05/08/22 0602     Blood Culture Gram Positive Cocci     Isolated from Aerobic and Anaerobic Bottles     Gram Stain Anaerobic Bottle Gram positive cocci      Aerobic Bottle Gram positive cocci          Medication Review:       Schedule Meds  cefTRIAXone, 2 g, Intravenous, Q24H  insulin lispro, 0-7 Units, Subcutaneous, TID AC  sodium chloride, 10 mL, Intravenous, Q12H        Infusion Meds       PRN Meds  •  acetaminophen **OR** acetaminophen **OR** acetaminophen  •  albumin human  •  aluminum-magnesium hydroxide-simethicone  •  dextrose  •  dextrose  •  glucagon (human recombinant)  •  insulin lispro **AND** insulin lispro  •  melatonin  •  ondansetron **OR** ondansetron  •  sodium chloride  •  sodium chloride        Assessment/Plan       Antimicrobial Therapy   1.         2.        3.        4.        5.            Assessment     Positive blood culture for Streptococcus species in 2 out of 2 sets on admission.  Blood cultures are not finalized.  We need to rule out tunneled dialysis catheter infection     The patient presented hospital with shortness of breath and chest x-ray showed density at the right base.  Need to rule out pneumonia  -CT showed some large bilateral pleural effusions but no obvious pneumonia  -Patient is currently on 2 L of oxygen by cannula     End-stage renal disease on hemodialysis.  Patient had right chest tunneled dialysis catheter and left arm AV fistula     History of  CVA     Plan     Continue ceftriaxone 2 g IV daily waiting on final blood culture results  Request dialysis nurse to obtain 1 set of blood culture from the tunneled dialysis catheter- still needs to be collected  Request 2D echo to rule out vegetation- not performed yet  Supportive care  Crescencio aVlencia, APRN  05/08/22  12:20 EDT    Note is dictated utilizing voice recognition software/Dragon
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
## 2022-05-08 NOTE — PLAN OF CARE
Problem: Adult Inpatient Plan of Care  Goal: Plan of Care Review  Outcome: Ongoing, Progressing  Flowsheets (Taken 5/8/2022 1440)  Progress: no change  Plan of Care Reviewed With: patient  Outcome Evaluation: on DM diet now

## 2022-05-08 NOTE — PROGRESS NOTES
[facility] Medicine Services Daily Progress Note    Patient Name: Benson Mclean  : 1950  MRN: [id number]  Primary Care Physician:  Rudolph Rooney MD  Date of admission: 2022      Subjective        Chief Complaint: Shortness of breath fluid overload.        Patient is complaining that he cannot eat normal food because he is on a mechanical soft diet.  Speech therapy following     ROS negative except as above    Objective      Vitals:   Temp:  [97.5 °F (36.4 °C)-98.2 °F (36.8 °C)] 97.5 °F (36.4 °C)  Heart Rate:  [] 120  Resp:  [17-18] 18  BP: (103-122)/(64-79) 108/64  Flow (L/min):  [2] 2    Physical Exam  Vitals reviewed.   Constitutional:       Comments: Chronically weak appearing  Frequent cough   HENT:      Head: Normocephalic.   Cardiovascular:      Rate and Rhythm: Normal rate.   Pulmonary:      Effort: Pulmonary effort is normal.   Abdominal:      General: Abdomen is flat.      Palpations: Abdomen is soft.   Musculoskeletal:         General: Normal range of motion.      Cervical back: Normal range of motion.   Skin:     General: Skin is warm.   Neurological:      General: No focal deficit present.      Mental Status: He is alert and oriented to person, place, and time.   Psychiatric:         Mood and Affect: Mood normal.         Behavior: Behavior normal.        Result Review    Result Review:  I have personally reviewed the results from the time of this admission to 2022 17:36 EDT and agree with these findings:  [x]  Laboratory  []  Microbiology  []  Radiology  []  EKG/Telemetry   []  Cardiology/Vascular   []  Pathology  []  Old records  []  Other:  Most notable findings include: Lab work stable post hemodialysis    Wounds (last 24 hours)     LDA Wound     Row Name 22 0701 22 1935          Wound 22 170 Right heel    Wound - Properties Group Placement Date: 22  -MAGNO Placement Time:   - Present on Hospital Admission: Y  -MAGNO Side:  Right  -MAGNO Location: heel  -MAGNO     Pressure Injury Stage U  -YS --     Dressing Appearance open to air  -YS --     Closure Open to air  -YS Open to air  -JW     Base dry;red  -YS dry;red  -JW     Drainage Amount none  -YS none  -JW     Dressing Care open to air  -YS --     Retired Wound - Properties Group Placement Date: 05/06/22  -MAGNO Placement Time: 1706 -MAGNO Present on Hospital Admission: Y  -MAGNO Side: Right  -MAGNO Location: heel  -MAGNO     Retired Wound - Properties Group Date first assessed: 05/06/22  -MAGNO Time first assessed: 1706 -MAGNO Present on Hospital Admission: Y  -MAGNO Side: Right  -MAGNO Location: heel  -MAGNO            Wound 03/30/22 0343 Left heel    Wound - Properties Group Placement Date: 03/30/22  -AS Placement Time: 0343 -AS Present on Hospital Admission: Y  -AS Side: Left  -AS Location: heel  -AS     Pressure Injury Stage U  -YS --     Dressing Appearance open to air  -YS --     Closure Open to air  -YS --     Base dry  -YS --     Dressing Care open to air  -YS --     Retired Wound - Properties Group Placement Date: 03/30/22  -AS Placement Time: 0343  -AS Present on Hospital Admission: Y  -AS Side: Left  -AS Location: heel  -AS     Retired Wound - Properties Group Date first assessed: 03/30/22  -AS Time first assessed: 0343  -AS Present on Hospital Admission: Y  -AS Side: Left  -AS Location: heel  -AS            Wound 03/07/22 1805 Right anterior thigh Burn    Wound - Properties Group Placement Date: 03/07/22  -MG Placement Time: 1805  -MG Present on Hospital Admission: Y  -MG Side: Right  -MG Orientation: anterior  -MG Location: thigh  -MG Primary Wound Type: Burn  -KK     Dressing Appearance open to air  -YS --     Closure Open to air  -YS --     Base scab  -YS --     Dressing Care open to air  -YS --     Retired Wound - Properties Group Placement Date: 03/07/22  -MG Placement Time: 1805  -MG Present on Hospital Admission: Y  -MG Side: Right  -MG Orientation: anterior  -MG Location: thigh  -MG Primary Wound  Type: Burn  -KK     Retired Wound - Properties Group Date first assessed: 03/07/22  -MG Time first assessed: 1805  -MG Present on Hospital Admission: Y  -MG Side: Right  -MG Location: thigh  -MG Primary Wound Type: Burn  -KK            Wound 03/07/22 1806 Left anterior thigh Burn    Wound - Properties Group Placement Date: 03/07/22  -MG Placement Time: 1806  -MG Side: Left  -MG Orientation: anterior  -MG Location: thigh  -MG Primary Wound Type: Burn  -KK     Dressing Appearance open to air  -YS --     Closure Open to air  -YS --     Base scab  -YS --     Dressing Care open to air  -YS --     Retired Wound - Properties Group Placement Date: 03/07/22  -MG Placement Time: 1806  -MG Side: Left  -MG Orientation: anterior  -MG Location: thigh  -MG Primary Wound Type: Burn  -KK     Retired Wound - Properties Group Date first assessed: 03/07/22  -MG Time first assessed: 1806  -MG Side: Left  -MG Location: thigh  -MG Primary Wound Type: Burn  -KK            Wound 05/06/22 1710 Right anterior greater trochanter    Wound - Properties Group Placement Date: 05/06/22  -MAGNO Placement Time: 1710  -MAGNO Present on Hospital Admission: Y  -MAGNO Side: Right  -MAGNO Orientation: anterior  -MAGNO Location: greater trochanter  -MAGNO     Pressure Injury Stage 3  -YS --     Dressing Appearance open to air  -YS --     Closure Open to air  -YS --     Base non-blanchable;moist;pink  -YS --     Drainage Amount none  -YS --     Care, Wound barrier applied  -YS --     Dressing Care skin barrier agent applied  -YS --     Periwound Care barrier ointment applied  -YS --     Retired Wound - Properties Group Placement Date: 05/06/22  -MAGNO Placement Time: 1710  -MAGNO Present on Hospital Admission: Y  -MAGNO Side: Right  -MAGNO Orientation: anterior  -MAGNO Location: greater trochanter  -MAGNO     Retired Wound - Properties Group Date first assessed: 05/06/22  -MAGNO Time first assessed: 1710  -MAGNO Present on Hospital Admission: Y  -MAGNO Side: Right  -MAGNO Location: greater trochanter   -MAGNO            Wound 03/30/22 0341 Left posterior thigh    Wound - Properties Group Placement Date: 03/30/22  -AS Placement Time: 0341  -AS Present on Hospital Admission: Y  -AS Side: Left  -AS Orientation: posterior  -AS Location: thigh  -AS     Pressure Injury Stage 3  -YS --     Dressing Appearance open to air  -YS --     Closure Open to air  -YS --     Drainage Amount none  -YS --     Dressing Care skin barrier agent applied  -YS --     Retired Wound - Properties Group Placement Date: 03/30/22  -AS Placement Time: 0341  -AS Present on Hospital Admission: Y  -AS Side: Left  -AS Orientation: posterior  -AS Location: thigh  -AS     Retired Wound - Properties Group Date first assessed: 03/30/22  -AS Time first assessed: 0341  -AS Present on Hospital Admission: Y  -AS Side: Left  -AS Location: thigh  -AS            Wound 03/30/22 0340 perineum Pressure Injury    Wound - Properties Group Placement Date: 03/30/22  -AS Placement Time: 0340  -AS Present on Hospital Admission: Y  -AS Location: perineum  -AS Primary Wound Type: Pressure inj  -AS     Dressing Appearance open to air  -YS --     Closure Open to air  -YS --     Base non-blanchable;moist;pink  -YS --     Drainage Amount none  -YS --     Care, Wound barrier applied  -YS --     Dressing Care skin barrier agent applied  -YS --     Retired Wound - Properties Group Placement Date: 03/30/22  -AS Placement Time: 0340  -AS Present on Hospital Admission: Y  -AS Location: perineum  -AS Primary Wound Type: Pressure inj  -AS     Retired Wound - Properties Group Date first assessed: 03/30/22  -AS Time first assessed: 0340  -AS Present on Hospital Admission: Y  -AS Location: perineum  -AS Primary Wound Type: Pressure inj  -AS            Wound 01/20/22 1024 Left distal arm Incision    Wound - Properties Group Placement Date: 01/20/22  -EP Placement Time: 1024  -EP Present on Hospital Admission: N  -EP Side: Left  -EP Orientation: distal  -EP Location: arm  -EP Primary Wound  Type: Incision  -EP     Retired Wound - Properties Group Placement Date: 01/20/22  -EP Placement Time: 1024  -EP Present on Hospital Admission: N  -EP Side: Left  -EP Orientation: distal  -EP Location: arm  -EP Primary Wound Type: Incision  -EP     Retired Wound - Properties Group Date first assessed: 01/20/22  -EP Time first assessed: 1024  -EP Present on Hospital Admission: N  -EP Side: Left  -EP Location: arm  -EP Primary Wound Type: Incision  -EP            Wound 03/30/22 0342 Right popliteal    Wound - Properties Group Placement Date: 03/30/22  -AS Placement Time: 0342  -AS Present on Hospital Admission: Y  -AS Side: Right  -AS Location: popliteal  -AS     Dressing Appearance open to air  -YS --     Retired Wound - Properties Group Placement Date: 03/30/22  -AS Placement Time: 0342  -AS Present on Hospital Admission: Y  -AS Side: Right  -AS Location: popliteal  -AS     Retired Wound - Properties Group Date first assessed: 03/30/22  -AS Time first assessed: 0342  -AS Present on Hospital Admission: Y  -AS Side: Right  -AS Location: popliteal  -AS            Wound 03/30/22 0348 Right anterior ankle    Wound - Properties Group Placement Date: 03/30/22  -AS Placement Time: 0348  -AS Present on Hospital Admission: Y  -AS Side: Right  -AS Orientation: anterior  -AS Location: ankle  -AS     Retired Wound - Properties Group Placement Date: 03/30/22  -AS Placement Time: 0348  -AS Present on Hospital Admission: Y  -AS Side: Right  -AS Orientation: anterior  -AS Location: ankle  -AS     Retired Wound - Properties Group Date first assessed: 03/30/22  -AS Time first assessed: 0348  -AS Present on Hospital Admission: Y  -AS Side: Right  -AS Location: ankle  -AS            Wound 03/30/22 0349 Left antecubital    Wound - Properties Group Placement Date: 03/30/22  -AS Placement Time: 0349  -AS Present on Hospital Admission: Y  -AS Side: Left  -AS Location: antecubital  -AS     Retired Wound - Properties Group Placement Date:  03/30/22  -AS Placement Time: 0349  -AS Present on Hospital Admission: Y  -AS Side: Left  -AS Location: antecubital  -AS     Retired Wound - Properties Group Date first assessed: 03/30/22  -AS Time first assessed: 0349  -AS Present on Hospital Admission: Y  -AS Side: Left  -AS Location: antecubital  -AS            Wound 04/09/22 1201 Right posterior elbow Skin Tear    Wound - Properties Group Placement Date: 04/09/22  -AD Placement Time: 1201  -AD Present on Hospital Admission: N  -AD Side: Right  -AD Orientation: posterior  -AD Location: elbow  -AD Primary Wound Type: Skin tear  -AD     Retired Wound - Properties Group Placement Date: 04/09/22  -AD Placement Time: 1201  -AD Present on Hospital Admission: N  -AD Side: Right  -AD Orientation: posterior  -AD Location: elbow  -AD Primary Wound Type: Skin tear  -AD     Retired Wound - Properties Group Date first assessed: 04/09/22  -AD Time first assessed: 1201  -AD Present on Hospital Admission: N  -AD Side: Right  -AD Location: elbow  -AD Primary Wound Type: Skin tear  -AD           User Key  (r) = Recorded By, (t) = Taken By, (c) = Cosigned By    Initials Name Provider Type    Ariela Jimenez RN Registered Nurse    Zainab Griffiths RN Registered Nurse    Ashia Castaneda, RN Registered Nurse    Maranda Phan LPN Licensed Nurse    Radha Quispe RN Registered Nurse    Dory Shaver RN Registered Nurse    Maricruz Jiménez, RN Registered Nurse    Noy Torres, RN Registered Nurse                     Assessment/Plan    71-year-old gentleman with fluid overload and bacteremia     Active Hospital Problems:          Active Hospital Problems     Diagnosis     • **Fluid overload     • Chronic respiratory failure with hypoxia, on home oxygen therapy (HCC)     • Elevated troponin     • Hypothyroidism (acquired)     • End stage renal disease (HCC)     • Anemia of renal disease     • Mixed hyperlipidemia     • Major depressive disorder, recurrent,  mild (HCC)     • Flaccid hemiplegia of right dominant side as late effect of cerebral infarction (HCC)     • COPD with acute exacerbation (HCC)     • S/P CABG (coronary artery bypass graft)     • Essential hypertension     • ANNETTE treated with BiPAP        Plan:      Shortness of breath secondary to fluid overload  Chronic respiratory failure with oxygen dependence   -CXR: small right greater than left pleural effusions.  Bibasilar airspace disease, right greater than left, favored to represent atelectasis.   -proBNP >70,000 (previous proBNP march 2022 was also >70,000)  -pt on his home rate of 3L O2 via [place]  -pt did not complete dialysis  -nephrology consulted appreciate assistance     Bacteremia with Streptococcus  ID consulted  IV antibiotics ordered  Pending culture of dialysis catheter  Pending final identification  Echo pending  Appreciate ID assistance    Chronically elevated troponin  -troponin 0.33 compared to troponin 0.146 on 3/12/2022.   -Will trend      ESRD on HD MWF  -on midodrine for BP support  -nephrology to manage     CVA with right sided residual weakness  -on eliquis, statin     CAD s/p CABG, PCI  Chronic atrial fibrillation  Hyperlipidemia  -chronic, controlled.   -continue Eliquis, atorvastatin, and metoprolol     DM type 2  -hemoglobin A1c 6.3 on 3/12/2022 and previous admission basal insulin stopped due to hypoglycemia  -SSI AC/HS     Hypothyroidism  -Continue home synthroid     Major depressive disorder, recurrent, mild   Dementia  -Continue home zoloft, aricept     ANNETTE  -cpap qhs      Obesity (BMI 30-39.9)  BMI 36.92  -Lifestyle modifications to reduce cardiovascular risk factors     Dysphagia  Patient complaining about his current diet.  Speech following.     DVT prophylaxis: eliquis      CODE STATUS:    Admission Status:  I believe this patient meets observation status.     I discussed the patient's findings and my recommendations with patient.     This patient has been examined wearing  appropriate Personal Protective Equipment.    05/08/22      Electronically signed by Sunny Shannon MD, 05/08/22, 17:36 EDT.  [facility] Hospitalist Team

## 2022-05-08 NOTE — DISCHARGE PLACEMENT REQUEST
"Benson Tobin (71 y.o. Male)             Date of Birth   1950    Social Security Number       Address   [address] [place] [address]    Home Phone   [phone]    MRN   [id number]       [facility]    Marital Status                               Admission Date   5/6/22    Admission Type   Emergency    Admitting Provider   Sunny Shannon MD    Attending Provider   Sunny Shannon MD    Department, Room/Bed   [facility] 2B MEDICAL INPATIENT, 229/1       Discharge Date       Discharge Disposition       Discharge Destination                               Attending Provider: Sunny Shannon MD    Allergies: Codeine    Isolation: None   Infection: Candida Auris (rule out) (05/07/22)   Code Status: Prior   Advance Care Planning Activity    Ht: 177.8 cm (70\")   Wt: 104 kg (229 lb)    Admission Cmt: None   Principal Problem: Fluid overload [E87.70]                 Active Insurance as of 5/6/2022     Primary Coverage     Payor Plan Insurance Group Employer/Plan Group    HUMANA MEDICARE REPLACEMENT HUMANA MEDICARE REPLACEMENT [id number]     Payor Plan Address Payor Plan Phone Number Payor Plan Fax Number Effective Dates    [address] [address] [phone]  1/1/2018 - None Entered    [facility] [id number]       Subscriber Name Subscriber Birth Date Member ID       BENSON TOBIN 1950 [id number]           Secondary Coverage     Payor Plan Insurance Group Employer/Plan Group    [place]      Payor Plan Address Payor Plan Phone Number Payor Plan Fax Number Effective Dates    [address]   5/1/2021 - None Entered    [place] [id number]       Subscriber Name Subscriber Birth Date Member ID       BENSON TOBIN 1950 [id number]                 Emergency Contacts      (Rel.) Home Phone Work Phone Mobile Phone    CARIDADSHAUNA (Spouse) [phone] -- [phone]    CaridadDavis (Brother) -- -- [phone]             "   History & Physical      Jud Oliver APRN at 22 1627     Attestation signed by Sunny Shannon MD at 22 1058    Agree with history physical assessment and plan.  Chronically ill gentleman on dialysis here with fluid overload due to incomplete dialysis.  Nephrology consulted.                    [facility]      Patient Name: Benson Mclean  : 1950  MRN: [id number]  Primary Care Physician:  Rudolph Rooney MD  Date of admission: 2022      Subjective      Chief Complaint: shortness of breath     History of Present Illness: Benson Mclean is a 71 y.o. male with multiple medical issues including ESRD on HD [facility], chronic systolic congestive heart failure, CAD s/p CABG, cardiac stent, chronic atrial fibrillation, CVA with residual right-sided weakness, COPD on chronic oxygen 3L O2 via NC, ANNETTE on Cpap, previous PE,  HTN, HLD, type 2 diabetes mellitus complicated by neuropathy, anemia of renal disease, vitamin deficiencies, and dementia who presented to [facility] ED 2022 from dialysis center. Apparently he was sent to the ED for shortness of breath before completing his dialysis treatment. He had one hour left per nursing report. He also apparently did not have dialysis on Wednesday. The patient complains of shortness of breath and cough, possibly a fever. No increased lower extremity edema. He stated he is bedbound. He is a poor historian and not willing to offer a great deal of information.     In the ED the patient had CXR that showed small right greater than left pleural effusions.  Bibasilar airspace disease, right greater than left, favored to represent atelectasis.  proBNP greater then 70,000, troponin 0.33, creatinine 2.74, BUN 15, normal potassium, normal WBC, normal lactate, Hgb 11.0.  Blood cultures drawn.  COVID-negative.  Pt on his home rate of 3L O2 via nasal cannula. Nephrology was consulted with plans  for hemodialysis.  He was admitted to the hospitalist group for further treatment of acute on chronic respiratory failure secondary to fluid overload, needs dialysis.       Review of Systems   Constitutional: Negative.   HENT: Negative.    Eyes: Negative.    Cardiovascular: Negative.    Respiratory: Positive for cough and shortness of breath.    Endocrine: Negative.    Hematologic/Lymphatic: Negative.    Skin: Negative.    Musculoskeletal: Negative.    Gastrointestinal: Negative.    Genitourinary: Negative.    Neurological: Negative.    Psychiatric/Behavioral: Negative.    Allergic/Immunologic: Negative.    All other systems reviewed and are negative.       Personal History     Past Medical History:   Diagnosis Date   • A-fib (Formerly [facility]) 8/3/2021   • Anemia    • Appetite loss    • Arthritis    • Brain bleed (Formerly [facility])    • Carpal tunnel syndrome on left    • CHF (congestive heart failure) (Formerly [facility])    • Chronic left-sided low back pain without sciatica 12/27/2017   • CKD (chronic kidney disease) stage 3, GFR 30-59 ml/min ([facility]ly [facility])    • Closed fracture of seventh thoracic vertebra ([facility]ly [facility]) 8/23/2020   • Cognitive communication deficit 12/1/2020   • COPD (chronic obstructive pulmonary disease) (Formerly [facility])    • Coronary artery disease    • COVID-19 2/19/2021   • Cytokine release syndrome, grade 1 5/24/2021   • Depression    • Diabetic neuropathy ([facility]ly [facility])    • Dialysis patient (Formerly [facility])     mon wed fri   • DM2 (diabetes mellitus, type 2) (Formerly [facility])    • GERD (gastroesophageal reflux disease)    • Hyperlipidemia    • Hypertension    • Hypogonadism in male    • Neuropathy    • Nonsustained ventricular tachycardia ([facility]) 7/23/2021   • Obesity    • Paroxysmal atrial fibrillation ([facility]) 12/1/2020   • Sleep apnea    • Stroke ([facility])    • Unsteady gait        Past Surgical History:   Procedure Laterality Date   • ANGIOPLASTY      X2   • APPENDECTOMY     • ARTERIOVENOUS FISTULA/SHUNT SURGERY Left 1/20/2022    Procedure: LEFT BRACHIAL CEPHALIC ARTERIAL VENOUS FISTULA  CREATION;  Surgeon: Yony Davila MD;  Location:  [facility];  Service: Vascular;  Laterality: Left;   • CARDIAC CATHETERIZATION  11/13/2015   • CARDIAC CATHETERIZATION N/A 5/24/2021    Procedure: Left Heart Cath and coronary angiogram;  Surgeon: Jose G Rm MD;  Location:  [facility];  Service: Cardiovascular;  Laterality: N/A;   • CARDIAC CATHETERIZATION  5/24/2021    Procedure: Saphenous Vein Graft;  Surgeon: Jose G Rm MD;  Location:  [facility];  Service: Cardiovascular;;   • CARDIAC CATHETERIZATION N/A 5/24/2021    Procedure: Percutaneous Coronary Intervention;  Surgeon: Bernabe Zambrano MD;  Location:  [facility];  Service: Cardiology;  Laterality: N/A;   • CARDIAC CATHETERIZATION N/A 5/24/2021    Procedure: Stent NIELS coronary;  Surgeon: Bernabe Zambrano MD;  Location:  [facility];  Service: Cardiology;  Laterality: N/A;   • CARDIAC CATHETERIZATION N/A 5/26/2021    Procedure: Percutaneous Coronary Intervention;  Surgeon: Bernabe Zambrano MD;  Location:  [facility];  Service: Cardiovascular;  Laterality: N/A;   • CARDIAC CATHETERIZATION N/A 5/26/2021    Procedure: Stent NIELS bypass graft;  Surgeon: Bernabe Zambrano MD;  Location:  [facility];  Service: Cardiovascular;  Laterality: N/A;   • CARDIAC ELECTROPHYSIOLOGY PROCEDURE N/A 5/24/2021    Procedure: Temporary Pacemaker;  Surgeon: Bernabe Zambrano MD;  Location:  [facility];  Service: Cardiology;  Laterality: N/A;   • CARDIAC ELECTROPHYSIOLOGY PROCEDURE N/A 5/26/2021    Procedure: Temporary Pacemaker;  Surgeon: Bernabe Zambrano MD;  Location:  [facility];  Service: Cardiovascular;  Laterality: N/A;   • CARPAL TUNNEL RELEASE Left 04/29/2018    carpal tunnel- lt hand// other hand surgeries    • CATARACT EXTRACTION, BILATERAL  2002    Dr. Lux Acosta   • CHOLECYSTECTOMY     • COLON RESECTION  2005   • CORONARY  ANGIOPLASTY     • CORONARY ANGIOPLASTY WITH STENT PLACEMENT  11/13/2015    PTCA stent to proximal in stent and mid to distal lad   • CORONARY ANGIOPLASTY WITH STENT PLACEMENT  09/16/2016    PTCA stent to mid lad and stent to vein graft to marginal   • CORONARY ARTERY BYPASS GRAFT  2005    @ [facility]   • CYST REMOVAL      cyst removed from scrotum   • FOOT SURGERY Right 07/17/2018   • FOOT SURGERY Left 02/04/2019   • PORTACATH PLACEMENT     • TOE AMPUTATION Right     right toe removed D/T infected cut that went to the bone       Family History: family history includes Diabetes in his brother and sister; Heart disease in his father, mother, and sister; Mental illness in his brother. Otherwise pertinent FHx was reviewed and not pertinent to current issue.    Social History:  reports that he has quit smoking. His smoking use included cigarettes. He has a 60.00 pack-year smoking history. He has never used smokeless tobacco. He reports current drug use. Frequency: 1.00 time per week. Drug: Marijuana. He reports that he does not drink alcohol.    Home Medications:  Prior to Admission Medications     Prescriptions Last Dose Informant Patient Reported? Taking?    albuterol sulfate  (90 Base) MCG/ACT inhaler   Yes No    Inhale 2 puffs Every 4 (Four) Hours.    apixaban (ELIQUIS) 5 MG tablet tablet   No No    Take 1 tablet by mouth Every 12 (Twelve) Hours. Indications: Atrial Fibrillation    atorvastatin (LIPITOR) 40 MG tablet   Yes No    Take 40 mg by mouth Every Night.    bisacodyl (DULCOLAX) 10 MG suppository   Yes No    Insert 10 mg into the rectum Daily As Needed for Constipation.    budesonide (Pulmicort) 0.5 MG/2ML nebulizer solution   No No    Take 2 mL by nebulization 2 (Two) Times a Day.    cholecalciferol (VITAMIN D3) 25 MCG (1000 UT) tablet   Yes No    Take 1,000 Units by mouth Daily.    docusate sodium (COLACE) 100 MG capsule   Yes No    Take 100 mg by mouth Daily.    donepezil (ARICEPT) 10 MG tablet   Yes No     Take 10 mg by mouth Every Night.    fluticasone (FLONASE) 50 MCG/ACT nasal spray   Yes No    2 sprays by Each Nare route 2 (Two) Times a Day.    guaiFENesin (MUCINEX) 600 MG 12 hr tablet   Yes No    Take 600 mg by mouth 2 (Two) Times a Day.    ipratropium-albuterol (DUO-NEB) 0.5-2.5 mg/3 ml nebulizer   No No    Take 3 mL by nebulization 3 (Three) Times a Day.    ipratropium-albuterol (DUO-NEB) 0.5-2.5 mg/3 ml nebulizer   No No    Take 3 mL by nebulization Every 4 (Four) Hours As Needed for Wheezing.    levothyroxine (SYNTHROID, LEVOTHROID) 50 MCG tablet   Yes No    Take 50 mcg by mouth Every Morning.    Metoprolol Tartrate 37.5 MG tablet   Yes No    Take 37.5 mg by mouth 2 (Two) Times a Day. Hold for SBP <110 or HR < 60    midodrine (PROAMATINE) 10 MG tablet   Yes No    Take 10 mg by mouth 3 (Three) Times a Day Before Meals. Hold for BP > 140/90    omeprazole (priLOSEC) 20 MG capsule   Yes No    Take 20 mg by mouth Daily.    phenylephrine-mineral oil-petrolatum (Preparation H) 0.25-14-74.9 % ointment hemorrhoidal ointment   Yes No    Insert 1 application into the rectum Daily As Needed.    primidone (MYSOLINE) 50 MG tablet   Yes No    Take 25 mg by mouth Daily.    sertraline (ZOLOFT) 50 MG tablet   Yes No    Take 50 mg by mouth Daily.    vitamin B-12 (CYANOCOBALAMIN) 1000 MCG tablet   Yes No    Take 1,000 mcg by mouth Daily.            Allergies:  Allergies   Allergen Reactions   • Codeine Itching       Objective      Vitals:   Temp:  [98 °F (36.7 °C)] 98 °F (36.7 °C)  Heart Rate:  [114-118] 118  Resp:  [24] 24  BP: (100-129)/(60-70) 102/65  Flow (L/min):  [6] 6    Physical Exam  Vitals and nursing note reviewed.   Constitutional:       Appearance: He is obese.   HENT:      Head: Normocephalic and atraumatic.   Eyes:      Extraocular Movements: Extraocular movements intact.      Pupils: Pupils are equal, round, and reactive to light.   Cardiovascular:      Rate and Rhythm: Normal rate and regular rhythm.       Pulses: Normal pulses.   Pulmonary:      Effort: Pulmonary effort is normal.      Breath sounds: Examination of the right-lower field reveals decreased breath sounds. Examination of the left-lower field reveals decreased breath sounds. Decreased breath sounds present.   Abdominal:      General: Bowel sounds are normal.      Palpations: Abdomen is soft.      Tenderness: There is no abdominal tenderness.   Musculoskeletal:      Cervical back: Normal range of motion.      Comments: Bilateral leg weakness with chronic right leg weakness from previous CVA, bedbound at baseline   Skin:     General: Skin is warm and dry.   Neurological:      General: No focal deficit present.      Mental Status: He is alert.          Result Review    Result Review:  I have personally reviewed the results from the time of this admission to 5/6/2022 16:42 EDT and agree with these findings:  [x]  Laboratory  [x]  Microbiology  [x]  Radiology  [x]  EKG/Telemetry   []  Cardiology/Vascular   []  Pathology  [x]  Old records      Assessment/Plan        Active Hospital Problems:  Active Hospital Problems    Diagnosis    • **Fluid overload    • Chronic respiratory failure with hypoxia, on home oxygen therapy ([facility])    • Elevated troponin    • Hypothyroidism (acquired)    • End stage renal disease ([facility])    • Anemia of renal disease    • Mixed hyperlipidemia    • Major depressive disorder, recurrent, mild ([facility])    • Flaccid hemiplegia of right dominant side as late effect of cerebral infarction ([facility])    • COPD with acute exacerbation ([facility])    • S/P CABG (coronary artery bypass graft)    • Essential hypertension    • ANNETTE treated with BiPAP      Plan:     Shortness of breath secondary to fluid overload  Chronic respiratory failure with oxygen dependence   -CXR: small right greater than left pleural effusions.  Bibasilar airspace disease, right greater than left, favored to represent atelectasis.   -proBNP >70,000 (previous proBNP march 2022 was also  >70,000)  -pt on his home rate of 3L O2 via NC  -pt did not complete dialysis  -nephrology consulted with plans for stat dialysis     Chronically elevated troponin  -troponin 0.33 compared to troponin 0.146 on 3/12/2022.   -Will trend     ESRD on HD MWF  -on midodrine for BP support  -nephrology to manage    CVA with right sided residual weakness  -on eliquis, statin    CAD s/p CABG, PCI  Chronic atrial fibrillation  Hyperlipidemia  -chronic, controlled.   -continue Eliquis, atorvastatin, and metoprolol    DM type 2  -hemoglobin A1c 6.3 on 3/12/2022 and previous admission basal insulin stopped due to hypoglycemia  -SSI AC/HS     Hypothyroidism  -Continue home synthroid     Major depressive disorder, recurrent, mild   Dementia  -Continue home zoloft, aricept    ANNETTE  -cpap qhs      Obesity (BMI 30-39.9)  BMI 36.92  -Lifestyle modifications to reduce cardiovascular risk factors      DVT prophylaxis: eliquis     CODE STATUS:       Admission Status:  I believe this patient meets observation status.    I discussed the patient's findings and my recommendations with patient.    This patient has been examined wearing appropriate Personal Protective Equipment. 05/06/22      Signature: Electronically signed by FABIOLA Christianson, 05/06/22, 4:43 PM EDT.    Electronically signed by Sunny Shannon MD at 05/08/22 1058          Physician Progress Notes (last 24 hours)      Ericka Valencia APRN at 05/08/22 1219          Infectious Diseases Progress Note      LOS: 0 days   Patient Care Team:  Rudolph Rooney MD as PCP - General (Family Medicine)  Samantha Zabala APRN as PCP - Family Medicine  Jose G Rm MD as Consulting Physician (Cardiology)    Chief Complaint: Shortness of breath    Subjective       The patient has been afebrile for the last 24 hours.  The patient is on 2 L of oxygen by nasal cannula, hemodynamically stable, and is tolerating antimicrobial therapy.      Review of Systems:   Review of Systems    Constitutional: Positive for fatigue.   HENT: Negative.    Eyes: Negative.    Respiratory: Positive for shortness of breath.    Cardiovascular: Negative.    Gastrointestinal: Negative.    Endocrine: Negative.    Genitourinary: Negative.    Musculoskeletal: Negative.    Skin: Negative.    Neurological: Negative.    Psychiatric/Behavioral: Negative.    All other systems reviewed and are negative.       Objective     Vital Signs  Temp:  [97.6 °F (36.4 °C)-98.2 °F (36.8 °C)] 98.2 °F (36.8 °C)  Heart Rate:  [] 59  Resp:  [17-18] 18  BP: (101-122)/(67-79) 103/67    Physical Exam:  Physical Exam  Vitals and nursing note reviewed.   Constitutional:       General: He is not in acute distress.     Appearance: Normal appearance. He is well-developed and normal weight. He is not diaphoretic.   HENT:      Head: Normocephalic and atraumatic.   Eyes:      Conjunctiva/sclera: Conjunctivae normal.      Pupils: Pupils are equal, round, and reactive to light.   Cardiovascular:      Rate and Rhythm: Normal rate and regular rhythm.      Heart sounds: Normal heart sounds, S1 normal and S2 normal.   Pulmonary:      Effort: Pulmonary effort is normal. No respiratory distress.      Breath sounds: No stridor. Rales present. No wheezing.   Abdominal:      General: Bowel sounds are normal. There is no distension.      Palpations: Abdomen is soft. There is no mass.      Tenderness: There is no abdominal tenderness. There is no guarding.   Musculoskeletal:         General: No deformity. Normal range of motion.      Cervical back: Neck supple.   Skin:     General: Skin is warm and dry.      Coloration: Skin is not pale.      Findings: No erythema or rash.   Neurological:      Mental Status: He is alert and oriented to person, place, and time.      Cranial Nerves: No cranial nerve deficit.   Psychiatric:         Mood and Affect: Mood normal.          Results Review:    I have reviewed all clinical data, test, lab, and imaging results.      Radiology  CT Chest Without Contrast Diagnostic    Result Date: 5/7/2022  Examination: CT CHEST WO CONTRAST DIAGNOSTIC-  Date of Exam: 5/7/2022 5:14 PM  Indication: Possible pneumonia; N18.6-End stage renal disease; Z99.2-Dependence on renal dialysis; R06.00-Dyspnea, unspecified; Z91.15-Patient's noncompliance with renal dialysis; U55-Wvoqela effusion, not elsewhere classified.  Comparison: Correlation with chest radiograph 05/06/2022.  Technique: Non-contrast axial volumetric CT imaging of the chest was performed. Automated exposure control and iterative reconstruction methods were used.  Findings: There is moderate to large right and small-to-moderate left pleural effusion. There is complete atelectasis of the right lower lobe and atelectasis of approximately 60% of the left lower lobe. There is partial atelectasis of both upper lobes and the right middle lobe. Overall, aerated lung occupies approximately 40% of the thoracic cavity. There is a 7 mm nodule in the subpleural anterior left upper lobe on axial image 36. There is no pneumothorax. Central airways are patent. There is a small amount of mucoid debris in the right posterior trachea.  Thyroid, remainder of the trachea and esophagus appear within normal limits. There is severe native coronary artery calcification. The patient appears status post median sternotomy and CABG. The heart is enlarged. There is diminished attenuation of the blood pool suggesting anemia. The main pulmonary artery is enlarged up to 42 mm suggesting increased right heart pressure. No pericardial effusion. No pathologic mediastinal adenopathy.  There is skin thickening and edema in the midline upper back area there is marked diffuse muscular atrophy. There is a small volume of perihepatic ascites. There is a left kidney cyst at the inferior pole measuring 28 mm. There is an exophytic simple right kidney cyst measuring 24 mm. The patient is status post cholecystectomy. There is mild  bilateral gynecomastia. There is no acute osseous abnormality or destructive bone lesion. The bones appear demineralized. There are mild degenerative changes.       1. Moderate to large right and small-to-moderate left pleural effusions with significant passive atelectasis. Aerated lung occupies approximately 40% of the anatomic lung space. 2. Enlarged main pulmonary artery suggesting increased right heart pressure. 3. Coronary artery disease status post CABG. 5. Marked atrophy of the musculature diffusely. 6. Gynecomastia.  Electronically Signed By-Rigoberto Hill MD On:5/7/2022 5:45 PM This report was finalized on [id number] by  Rigoberto Hill MD.      Cardiology    Laboratory    Results from last 7 days   Lab Units 05/08/22 0446 05/07/22 0417 05/06/22  1045   WBC 10*3/mm3 6.10 6.70 5.20   HEMOGLOBIN g/dL 10.9* 10.9* 11.0*   HEMATOCRIT % 35.8* 35.7* 35.8*   PLATELETS 10*3/mm3 173 163 203     Results from last 7 days   Lab Units 05/08/22 0446 05/07/22 0417 05/06/22  1045   SODIUM mmol/L 133* 134* 138   POTASSIUM mmol/L 4.0 3.9 3.8   CHLORIDE mmol/L 97* 97* 95*   CO2 mmol/L 25.0 26.0 30.0*   BUN mg/dL 19 12 15   CREATININE mg/dL 2.94* 2.24* 2.74*   GLUCOSE mg/dL 116* 164* 99   ALBUMIN g/dL 2.60*  --  3.10*   BILIRUBIN mg/dL 0.4  --  0.7   ALK PHOS U/L 52  --  59   AST (SGOT) U/L 12  --  11   ALT (SGPT) U/L 5  --  5   CALCIUM mg/dL 8.1* 8.1* 8.1*                 Microbiology   Microbiology Results (last 10 days)     Procedure Component Value - Date/Time    CANDIDA AURIS SCREEN - Swab, Axilla Right, Axilla Left and Groin [[id number]]  (Normal) Collected: 05/07/22 1020    Lab Status: Preliminary result Specimen: Swab from Axilla Right, Axilla Left and Groin Updated: 05/08/22 1032     Candida Auris Screen Culture No Candida auris isolated at 24 hours    COVID PRE-OP / PRE-PROCEDURE SCREENING ORDER (NO ISOLATION) - Swab, Nasopharynx [[id number]]  (Normal) Collected: 05/06/22 1519    Lab Status: Final result  Specimen: Swab from Nasopharynx Updated: 05/06/22 1542    Narrative:      The following orders were created for panel order COVID PRE-OP / PRE-PROCEDURE SCREENING ORDER (NO ISOLATION) - Swab, Nasopharynx.  Procedure                               Abnormality         Status                     ---------                               -----------         ------                     COVID-19,CEPHEID/ROHAN,CO...[[id number]]  Normal              Final result                 Please view results for these tests on the individual orders.    COVID-19,CEPHEID/ROHAN,COR/[facility]/PAD/BEATRICE IN-HOUSE(OR EMERGENT/ADD-ON),NP SWAB IN TRANSPORT MEDIA 3-4 HR TAT, RT-PCR - Swab, Nasopharynx [[id number]]  (Normal) Collected: 05/06/22 1519    Lab Status: Final result Specimen: Swab from Nasopharynx Updated: 05/06/22 1542     COVID19 Not Detected    Narrative:      Fact sheet for providers: https://www.fda.gov/media/101730/download     Fact sheet for patients: https://www.fda.gov/media/113484/download  Fact sheet for providers: https://www.fda.gov/media/628437/download    Fact sheet for patients: https://www.fda.gov/media/132032/download    Test performed by PCR.    Blood Culture - Blood, Hand, Right [[id number]]  (Abnormal) Collected: 05/06/22 1116    Lab Status: Preliminary result Specimen: Blood from Hand, Right Updated: 05/08/22 0601     Blood Culture Gram Positive Cocci     Isolated from Aerobic and Anaerobic Bottles     Gram Stain Anaerobic Bottle Gram positive cocci      Aerobic Bottle Gram positive cocci    Blood Culture ID, PCR - Blood, Hand, Right [[id number]]  (Abnormal) Collected: 05/06/22 1116    Lab Status: Final result Specimen: Blood from Hand, Right Updated: 05/07/22 0216     BCID, PCR Streptococcus spp, not A, B, or pneumonia. Identification by BCID2 PCR.     BOTTLE TYPE Anaerobic Bottle    Blood Culture - Blood, Arm, Right [[id number]]  (Abnormal) Collected: 05/06/22 1057    Lab Status: Preliminary result Specimen: Blood from Arm, Right  Updated: 05/08/22 0602     Blood Culture Gram Positive Cocci     Isolated from Aerobic and Anaerobic Bottles     Gram Stain Anaerobic Bottle Gram positive cocci      Aerobic Bottle Gram positive cocci          Medication Review:       Schedule Meds  cefTRIAXone, 2 g, Intravenous, Q24H  insulin lispro, 0-7 Units, Subcutaneous, TID AC  sodium chloride, 10 mL, Intravenous, Q12H        Infusion Meds       PRN Meds  •  acetaminophen **OR** acetaminophen **OR** acetaminophen  •  albumin human  •  aluminum-magnesium hydroxide-simethicone  •  dextrose  •  dextrose  •  glucagon (human recombinant)  •  insulin lispro **AND** insulin lispro  •  melatonin  •  ondansetron **OR** ondansetron  •  sodium chloride  •  sodium chloride        Assessment/Plan       Antimicrobial Therapy   1.         2.        3.        4.        5.            Assessment     Positive blood culture for Streptococcus species in 2 out of 2 sets on admission.  Blood cultures are not finalized.  We need to rule out tunneled dialysis catheter infection     The patient presented hospital with shortness of breath and chest x-ray showed density at the right base.  Need to rule out pneumonia  -CT showed some large bilateral pleural effusions but no obvious pneumonia  -Patient is currently on 2 L of oxygen by cannula     End-stage renal disease on hemodialysis.  Patient had right chest tunneled dialysis catheter and left arm AV fistula     History of CVA     Plan     Continue ceftriaxone 2 g IV daily waiting on final blood culture results  Request dialysis nurse to obtain 1 set of blood culture from the tunneled dialysis catheter- still needs to be collected  Request 2D echo to rule out vegetation- not performed yet  Supportive care  FABIOLA Emmanuel  05/08/22  12:20 EDT    Note is dictated utilizing voice recognition software/Dragon    Electronically signed by Ericka Valencia APRN at 05/08/22 1224     Sunny Shannon MD at 05/07/22  1707              [facility] Medicine Services Daily Progress Note    Patient Name: Benson Mclean  : 1950  MRN: [id number]  Primary Care Physician:  Rudolph Rooney MD  Date of admission: 2022      Subjective      Chief Complaint: Shortness of breath fluid overload.      Patient Reports that has been coughing a lot.  Feels generally weak.  States his wife is in the hospital as well.  Found to have bacteremia.  ID consulted.    ROS negative except as above      Objective      Vitals:   Temp:  [97.9 °F (36.6 °C)] 97.9 °F (36.6 °C)  Heart Rate:  [] 78  Resp:  [17-18] 17  BP: ()/(60-70) 101/70  Flow (L/min):  [2-3] 2    Physical Exam  Vitals reviewed.   Constitutional:       Comments: Chronically weak appearing  Frequent cough   HENT:      Head: Normocephalic.   Cardiovascular:      Rate and Rhythm: Normal rate.   Pulmonary:      Effort: Pulmonary effort is normal.   Abdominal:      General: Abdomen is flat.      Palpations: Abdomen is soft.   Musculoskeletal:         General: Normal range of motion.      Cervical back: Normal range of motion.   Skin:     General: Skin is warm.   Neurological:      General: No focal deficit present.      Mental Status: He is alert and oriented to person, place, and time.   Psychiatric:         Mood and Affect: Mood normal.         Behavior: Behavior normal.             Result Review    Result Review:  I have personally reviewed the results from the time of this admission to 2022 17:07 EDT and agree with these findings:  [x]  Laboratory  []  Microbiology  []  Radiology  []  EKG/Telemetry   []  Cardiology/Vascular   []  Pathology  []  Old records  []  Other:  Most notable findings include: Positive for strep in the blood    Wounds (last 24 hours)     LDA Wound     Row Name 22 0703 22 1905 22 1712       Wound 22 170 Right heel    Wound - Properties Group Placement Date: 22  -MAGNO Placement Time: 1706  Present on Hospital Admission: Y  -MAGNO Side: Right  -MAGNO Location: heel  -MAGNO    Dressing Appearance open to air  -KB -- --    Closure -- Open to air  -JW --    Base dry  -KB dry;red  -JW --    Drainage Amount -- none  -JW --    Retired Wound - Properties Group Placement Date: 05/06/22  -MAGNO Placement Time: 1706 -MAGNO Present on Hospital Admission: Y  -MAGNO Side: Right  -MAGNO Location: heel  -MAGNO    Retired Wound - Properties Group Date first assessed: 05/06/22  -MAGNO Time first assessed: 1706  -MAGNO Present on Hospital Admission: Y  -MAGNO Side: Right  -MAGNO Location: heel  -MAGNO       Wound 03/30/22 0343 Left heel    Wound - Properties Group Placement Date: 03/30/22  -AS Placement Time: 0343  -AS Present on Hospital Admission: Y  -AS Side: Left  -AS Location: heel  -AS    Retired Wound - Properties Group Placement Date: 03/30/22  -AS Placement Time: 0343  -AS Present on Hospital Admission: Y  -AS Side: Left  -AS Location: heel  -AS    Retired Wound - Properties Group Date first assessed: 03/30/22  -AS Time first assessed: 0343  -AS Present on Hospital Admission: Y  -AS Side: Left  -AS Location: heel  -AS       Wound 03/07/22 1805 Right anterior thigh Burn    Wound - Properties Group Placement Date: 03/07/22  -MG Placement Time: 1805  -MG Present on Hospital Admission: Y  -MG Side: Right  -MG Orientation: anterior  -MG Location: thigh  -MG Primary Wound Type: Burn  -KK    Retired Wound - Properties Group Placement Date: 03/07/22  -MG Placement Time: 1805  -MG Present on Hospital Admission: Y  -MG Side: Right  -MG Orientation: anterior  -MG Location: thigh  -MG Primary Wound Type: Burn  -KK    Retired Wound - Properties Group Date first assessed: 03/07/22  -MG Time first assessed: 1805  -MG Present on Hospital Admission: Y  -MG Side: Right  -MG Location: thigh  -MG Primary Wound Type: Burn  -KK       Wound 03/07/22 1806 Left anterior thigh Burn    Wound - Properties Group Placement Date: 03/07/22  -MG Placement Time: 1806  -MG Side:  Left  -MG Orientation: anterior  -MG Location: thigh  -MG Primary Wound Type: Burn  -KK    Retired Wound - Properties Group Placement Date: 03/07/22  -MG Placement Time: 1806  -MG Side: Left  -MG Orientation: anterior  -MG Location: thigh  -MG Primary Wound Type: Burn  -KK    Retired Wound - Properties Group Date first assessed: 03/07/22  -MG Time first assessed: 1806  -MG Side: Left  -MG Location: thigh  -MG Primary Wound Type: Burn  -KK       Wound 05/06/22 1710 Right anterior greater trochanter    Wound - Properties Group Placement Date: 05/06/22  -MAGNO Placement Time: 1710  -MAGNO Present on Hospital Admission: Y  -MAGNO Side: Right  -MAGNO Orientation: anterior  -MAGNO Location: greater trochanter  -MAGNO    Base non-blanchable;moist;pink  -KB -- --    Retired Wound - Properties Group Placement Date: 05/06/22  -MAGNO Placement Time: 1710  -MAGNO Present on Hospital Admission: Y  -MAGNO Side: Right  -MAGNO Orientation: anterior  -MAGNO Location: greater trochanter  -MAGNO    Retired Wound - Properties Group Date first assessed: 05/06/22  -MAGNO Time first assessed: 1710  -MAGNO Present on Hospital Admission: Y  -MAGNO Side: Right  -MAGNO Location: greater trochanter  -MAGNO       Wound 03/30/22 0341 Left posterior thigh    Wound - Properties Group Placement Date: 03/30/22  -AS Placement Time: 0341  -AS Present on Hospital Admission: Y  -AS Side: Left  -AS Orientation: posterior  -AS Location: thigh  -AS    Retired Wound - Properties Group Placement Date: 03/30/22  -AS Placement Time: 0341  -AS Present on Hospital Admission: Y  -AS Side: Left  -AS Orientation: posterior  -AS Location: thigh  -AS    Retired Wound - Properties Group Date first assessed: 03/30/22  -AS Time first assessed: 0341  -AS Present on Hospital Admission: Y  -AS Side: Left  -AS Location: thigh  -AS       Wound 03/30/22 0340 perineum Pressure Injury    Wound - Properties Group Placement Date: 03/30/22  -AS Placement Time: 0340  -AS Present on Hospital Admission: Y  -AS Location: perineum  -AS  Primary Wound Type: Pressure inj  -AS    Wound Image View All Images -- -- View Images  -MAGNO    Dressing Appearance -- -- open to air  -HJ    Closure -- -- Open to air  -HJ    Base non-blanchable;moist;pink  -KB -- red  -HJ    Drainage Amount -- -- none  -HJ    Care, Wound -- -- barrier applied  -HJ    Dressing Care -- -- skin barrier agent applied  -HJ    Retired Wound - Properties Group Placement Date: 03/30/22  -AS Placement Time: 0340  -AS Present on Hospital Admission: Y  -AS Location: perineum  -AS Primary Wound Type: Pressure inj  -AS    Retired Wound - Properties Group Date first assessed: 03/30/22  -AS Time first assessed: 0340  -AS Present on Hospital Admission: Y  -AS Location: perineum  -AS Primary Wound Type: Pressure inj  -AS       Wound 01/20/22 1024 Left distal arm Incision    Wound - Properties Group Placement Date: 01/20/22  -EP Placement Time: 1024  -EP Present on Hospital Admission: N  -EP Side: Left  -EP Orientation: distal  -EP Location: arm  -EP Primary Wound Type: Incision  -EP    Retired Wound - Properties Group Placement Date: 01/20/22  -EP Placement Time: 1024  -EP Present on Hospital Admission: N  -EP Side: Left  -EP Orientation: distal  -EP Location: arm  -EP Primary Wound Type: Incision  -EP    Retired Wound - Properties Group Date first assessed: 01/20/22  -EP Time first assessed: 1024  -EP Present on Hospital Admission: N  -EP Side: Left  -EP Location: arm  -EP Primary Wound Type: Incision  -EP       Wound 03/30/22 0342 Right popliteal    Wound - Properties Group Placement Date: 03/30/22  -AS Placement Time: 0342  -AS Present on Hospital Admission: Y  -AS Side: Right  -AS Location: popliteal  -AS    Dressing Appearance open to air  -KB -- --    Retired Wound - Properties Group Placement Date: 03/30/22  -AS Placement Time: 0342  -AS Present on Hospital Admission: Y  -AS Side: Right  -AS Location: popliteal  -AS    Retired Wound - Properties Group Date first assessed: 03/30/22  -AS Time  first assessed: 0342  -AS Present on Hospital Admission: Y  -AS Side: Right  -AS Location: popliteal  -AS       Wound 03/30/22 0348 Right anterior ankle    Wound - Properties Group Placement Date: 03/30/22  -AS Placement Time: 0348  -AS Present on Hospital Admission: Y  -AS Side: Right  -AS Orientation: anterior  -AS Location: ankle  -AS    Retired Wound - Properties Group Placement Date: 03/30/22  -AS Placement Time: 0348  -AS Present on Hospital Admission: Y  -AS Side: Right  -AS Orientation: anterior  -AS Location: ankle  -AS    Retired Wound - Properties Group Date first assessed: 03/30/22  -AS Time first assessed: 0348  -AS Present on Hospital Admission: Y  -AS Side: Right  -AS Location: ankle  -AS       Wound 03/30/22 0349 Left antecubital    Wound - Properties Group Placement Date: 03/30/22  -AS Placement Time: 0349  -AS Present on Hospital Admission: Y  -AS Side: Left  -AS Location: antecubital  -AS    Retired Wound - Properties Group Placement Date: 03/30/22  -AS Placement Time: 0349  -AS Present on Hospital Admission: Y  -AS Side: Left  -AS Location: antecubital  -AS    Retired Wound - Properties Group Date first assessed: 03/30/22  -AS Time first assessed: 0349  -AS Present on Hospital Admission: Y  -AS Side: Left  -AS Location: antecubital  -AS       Wound 04/09/22 1201 Right posterior elbow Skin Tear    Wound - Properties Group Placement Date: 04/09/22  -AD Placement Time: 1201  -AD Present on Hospital Admission: N  -AD Side: Right  -AD Orientation: posterior  -AD Location: elbow  -AD Primary Wound Type: Skin tear  -AD    Retired Wound - Properties Group Placement Date: 04/09/22  -AD Placement Time: 1201  -AD Present on Hospital Admission: N  -AD Side: Right  -AD Orientation: posterior  -AD Location: elbow  -AD Primary Wound Type: Skin tear  -AD    Retired Wound - Properties Group Date first assessed: 04/09/22  -AD Time first assessed: 1201  -AD Present on Hospital Admission: N  -AD Side: Right  -AD  Location: elbow  -AD Primary Wound Type: Skin tear  -AD    Row Name 05/06/22 1711 05/06/22 1710 05/06/22 1709       Wound 05/06/22 1706 Right heel    Wound - Properties Group Placement Date: 05/06/22  -MAGNO Placement Time: 1706  -MAGNO Present on Hospital Admission: Y  -MAGNO Side: Right  -MAGNO Location: heel  -MAGNO    Retired Wound - Properties Group Placement Date: 05/06/22  -MAGNO Placement Time: 1706 -MAGNO Present on Hospital Admission: Y  -MAGNO Side: Right  -MAGNO Location: heel  -MAGNO    Retired Wound - Properties Group Date first assessed: 05/06/22  -MAGNO Time first assessed: 1706  -MAGNO Present on Hospital Admission: Y  -MAGNO Side: Right  -MAGNO Location: heel  -MAGNO       Wound 03/30/22 0343 Left heel    Wound - Properties Group Placement Date: 03/30/22  -AS Placement Time: 0343  -AS Present on Hospital Admission: Y  -AS Side: Left  -AS Location: heel  -AS    Retired Wound - Properties Group Placement Date: 03/30/22  -AS Placement Time: 0343  -AS Present on Hospital Admission: Y  -AS Side: Left  -AS Location: heel  -AS    Retired Wound - Properties Group Date first assessed: 03/30/22  -AS Time first assessed: 0343  -AS Present on Hospital Admission: Y  -AS Side: Left  -AS Location: heel  -AS       Wound 03/07/22 1805 Right anterior thigh Burn    Wound - Properties Group Placement Date: 03/07/22  -MG Placement Time: 1805  -MG Present on Hospital Admission: Y  -MG Side: Right  -MG Orientation: anterior  -MG Location: thigh  -MG Primary Wound Type: Burn  -KK    Wound Image View All Images -- -- View Images  -MAGNO    Dressing Appearance -- -- open to air  -HJ    Closure -- -- Open to air  -HJ    Base -- -- scab  -HJ    Dressing Care -- -- open to air  -HJ    Retired Wound - Properties Group Placement Date: 03/07/22  -MG Placement Time: 1805  -MG Present on Hospital Admission: Y  -MG Side: Right  -MG Orientation: anterior  -MG Location: thigh  -MG Primary Wound Type: Burn  -KK    Retired Wound - Properties Group Date first assessed: 03/07/22  -MG  Time first assessed: 1805  -MG Present on Hospital Admission: Y  -MG Side: Right  -MG Location: thigh  -MG Primary Wound Type: Burn  -KK       Wound 03/07/22 1806 Left anterior thigh Burn    Wound - Properties Group Placement Date: 03/07/22  -MG Placement Time: 1806  -MG Side: Left  -MG Orientation: anterior  -MG Location: thigh  -MG Primary Wound Type: Burn  -KK    Wound Image View All Images -- -- View Images  -MAGNO    Dressing Appearance -- -- open to air  -HJ    Closure -- -- Open to air  -HJ    Base -- -- scab  -HJ    Dressing Care -- -- open to air  -HJ    Retired Wound - Properties Group Placement Date: 03/07/22  -MG Placement Time: 1806  -MG Side: Left  -MG Orientation: anterior  -MG Location: thigh  -MG Primary Wound Type: Burn  -KK    Retired Wound - Properties Group Date first assessed: 03/07/22  -MG Time first assessed: 1806  -MG Side: Left  -MG Location: thigh  -MG Primary Wound Type: Burn  -KK       Wound 05/06/22 1710 Right anterior greater trochanter    Wound - Properties Group Placement Date: 05/06/22  -MAGNO Placement Time: 1710  -MAGNO Present on Hospital Admission: Y  -MAGNO Side: Right  -MAGNO Orientation: anterior  -MAGNO Location: greater trochanter  -MAGNO    Wound Image View All Images -- View Images  -MAGNO --    Pressure Injury Stage -- 3  -HJ --    Dressing Appearance -- open to air  -HJ --    Closure -- Open to air  -HJ --    Base -- moist;pink;red  -HJ --    Drainage Amount -- none  -HJ --    Care, Wound -- barrier applied  -HJ --    Dressing Care -- skin barrier agent applied  -HJ --    Retired Wound - Properties Group Placement Date: 05/06/22  -MAGNO Placement Time: 1710  -MAGNO Present on Hospital Admission: Y  -MAGNO Side: Right  -MAGNO Orientation: anterior  -MAGNO Location: greater trochanter  -MAGNO    Retired Wound - Properties Group Date first assessed: 05/06/22  -MAGNO Time first assessed: 1710  -MAGNO Present on Hospital Admission: Y  -MAGNO Side: Right  -MAGNO Location: greater trochanter  -MAGNO       Wound 03/30/22 0341 Left  posterior thigh    Wound - Properties Group Placement Date: 03/30/22  -AS Placement Time: 0341  -AS Present on Hospital Admission: Y  -AS Side: Left  -AS Orientation: posterior  -AS Location: thigh  -AS    Wound Image View All Images View Images  -MAGNO -- --    Pressure Injury Stage 3  -HJ -- --    Dressing Appearance open to air  -HJ -- --    Closure Open to air  -HJ -- --    Drainage Amount none  -HJ -- --    Dressing Care skin barrier agent applied  -HJ -- --    Retired Wound - Properties Group Placement Date: 03/30/22  -AS Placement Time: 0341  -AS Present on Hospital Admission: Y  -AS Side: Left  -AS Orientation: posterior  -AS Location: thigh  -AS    Retired Wound - Properties Group Date first assessed: 03/30/22  -AS Time first assessed: 0341  -AS Present on Hospital Admission: Y  -AS Side: Left  -AS Location: thigh  -AS       Wound 03/30/22 0340 perineum Pressure Injury    Wound - Properties Group Placement Date: 03/30/22  -AS Placement Time: 0340  -AS Present on Hospital Admission: Y  -AS Location: perineum  -AS Primary Wound Type: Pressure inj  -AS    Retired Wound - Properties Group Placement Date: 03/30/22  -AS Placement Time: 0340  -AS Present on Hospital Admission: Y  -AS Location: perineum  -AS Primary Wound Type: Pressure inj  -AS    Retired Wound - Properties Group Date first assessed: 03/30/22  -AS Time first assessed: 0340  -AS Present on Hospital Admission: Y  -AS Location: perineum  -AS Primary Wound Type: Pressure inj  -AS       Wound 01/20/22 1024 Left distal arm Incision    Wound - Properties Group Placement Date: 01/20/22  -EP Placement Time: 1024  -EP Present on Hospital Admission: N  -EP Side: Left  -EP Orientation: distal  -EP Location: arm  -EP Primary Wound Type: Incision  -EP    Retired Wound - Properties Group Placement Date: 01/20/22  -EP Placement Time: 1024  -EP Present on Hospital Admission: N  -EP Side: Left  -EP Orientation: distal  -EP Location: arm  -EP Primary Wound Type:  Incision  -EP    Retired Wound - Properties Group Date first assessed: 01/20/22  -EP Time first assessed: 1024  -EP Present on Hospital Admission: N  -EP Side: Left  -EP Location: arm  -EP Primary Wound Type: Incision  -EP       Wound 03/30/22 0342 Right popliteal    Wound - Properties Group Placement Date: 03/30/22  -AS Placement Time: 0342  -AS Present on Hospital Admission: Y  -AS Side: Right  -AS Location: popliteal  -AS    Retired Wound - Properties Group Placement Date: 03/30/22  -AS Placement Time: 0342  -AS Present on Hospital Admission: Y  -AS Side: Right  -AS Location: popliteal  -AS    Retired Wound - Properties Group Date first assessed: 03/30/22  -AS Time first assessed: 0342  -AS Present on Hospital Admission: Y  -AS Side: Right  -AS Location: popliteal  -AS       Wound 03/30/22 0348 Right anterior ankle    Wound - Properties Group Placement Date: 03/30/22  -AS Placement Time: 0348  -AS Present on Hospital Admission: Y  -AS Side: Right  -AS Orientation: anterior  -AS Location: ankle  -AS    Retired Wound - Properties Group Placement Date: 03/30/22  -AS Placement Time: 0348  -AS Present on Hospital Admission: Y  -AS Side: Right  -AS Orientation: anterior  -AS Location: ankle  -AS    Retired Wound - Properties Group Date first assessed: 03/30/22  -AS Time first assessed: 0348  -AS Present on Hospital Admission: Y  -AS Side: Right  -AS Location: ankle  -AS       Wound 03/30/22 0349 Left antecubital    Wound - Properties Group Placement Date: 03/30/22  -AS Placement Time: 0349  -AS Present on Hospital Admission: Y  -AS Side: Left  -AS Location: antecubital  -AS    Retired Wound - Properties Group Placement Date: 03/30/22  -AS Placement Time: 0349  -AS Present on Hospital Admission: Y  -AS Side: Left  -AS Location: antecubital  -AS    Retired Wound - Properties Group Date first assessed: 03/30/22  -AS Time first assessed: 0349  -AS Present on Hospital Admission: Y  -AS Side: Left  -AS Location: antecubital   -AS       Wound 04/09/22 1201 Right posterior elbow Skin Tear    Wound - Properties Group Placement Date: 04/09/22  -AD Placement Time: 1201  -AD Present on Hospital Admission: N  -AD Side: Right  -AD Orientation: posterior  -AD Location: elbow  -AD Primary Wound Type: Skin tear  -AD    Retired Wound - Properties Group Placement Date: 04/09/22  -AD Placement Time: 1201  -AD Present on Hospital Admission: N  -AD Side: Right  -AD Orientation: posterior  -AD Location: elbow  -AD Primary Wound Type: Skin tear  -AD    Retired Wound - Properties Group Date first assessed: 04/09/22  -AD Time first assessed: 1201  -AD Present on Hospital Admission: N  -AD Side: Right  -AD Location: elbow  -AD Primary Wound Type: Skin tear  -AD    Row Name 05/06/22 1708             Wound 05/06/22 1706 Right heel    Wound - Properties Group Placement Date: 05/06/22  -MAGNO Placement Time: 1706  -MAGNO Present on Hospital Admission: Y  -MAGNO Side: Right  -MAGNO Location: heel  -MAGNO      Retired Wound - Properties Group Placement Date: 05/06/22  -MAGNO Placement Time: 1706  -MAGNO Present on Hospital Admission: Y  -MAGNO Side: Right  -MAGNO Location: heel  -MAGNO      Retired Wound - Properties Group Date first assessed: 05/06/22  -MAGNO Time first assessed: 1706  -MAGNO Present on Hospital Admission: Y  -MAGNO Side: Right  -MAGNO Location: heel  -MAGNO              Wound 03/30/22 0343 Left heel    Wound - Properties Group Placement Date: 03/30/22  -AS Placement Time: 0343  -AS Present on Hospital Admission: Y  -AS Side: Left  -AS Location: heel  -AS      Wound Image View All Images View Images  -MAGNO      Pressure Injury Stage U  -HJ      Dressing Appearance open to air  -HJ      Closure Open to air  -HJ      Base dry  -HJ      Dressing Care open to air  -HJ      Retired Wound - Properties Group Placement Date: 03/30/22  -AS Placement Time: 0343  -AS Present on Hospital Admission: Y  -AS Side: Left  -AS Location: heel  -AS      Retired Wound - Properties Group Date first assessed:  03/30/22  -AS Time first assessed: 0343  -AS Present on Hospital Admission: Y  -AS Side: Left  -AS Location: heel  -AS              Wound 03/07/22 1805 Right anterior thigh Burn    Wound - Properties Group Placement Date: 03/07/22  -MG Placement Time: 1805  -MG Present on Hospital Admission: Y  -MG Side: Right  -MG Orientation: anterior  -MG Location: thigh  -MG Primary Wound Type: Burn  -KK      Retired Wound - Properties Group Placement Date: 03/07/22  -MG Placement Time: 1805  -MG Present on Hospital Admission: Y  -MG Side: Right  -MG Orientation: anterior  -MG Location: thigh  -MG Primary Wound Type: Burn  -KK      Retired Wound - Properties Group Date first assessed: 03/07/22  -MG Time first assessed: 1805  -MG Present on Hospital Admission: Y  -MG Side: Right  -MG Location: thigh  -MG Primary Wound Type: Burn  -KK              Wound 03/07/22 1806 Left anterior thigh Burn    Wound - Properties Group Placement Date: 03/07/22  -MG Placement Time: 1806  -MG Side: Left  -MG Orientation: anterior  -MG Location: thigh  -MG Primary Wound Type: Burn  -KK      Retired Wound - Properties Group Placement Date: 03/07/22  -MG Placement Time: 1806  -MG Side: Left  -MG Orientation: anterior  -MG Location: thigh  -MG Primary Wound Type: Burn  -KK      Retired Wound - Properties Group Date first assessed: 03/07/22  -MG Time first assessed: 1806  -MG Side: Left  -MG Location: thigh  -MG Primary Wound Type: Burn  -KK              Wound 03/30/22 0341 Left posterior thigh    Wound - Properties Group Placement Date: 03/30/22  -AS Placement Time: 0341  -AS Present on Hospital Admission: Y  -AS Side: Left  -AS Orientation: posterior  -AS Location: thigh  -AS      Retired Wound - Properties Group Placement Date: 03/30/22  -AS Placement Time: 0341  -AS Present on Hospital Admission: Y  -AS Side: Left  -AS Orientation: posterior  -AS Location: thigh  -AS      Retired Wound - Properties Group Date first assessed: 03/30/22  -AS Time first  assessed: 0341  -AS Present on Hospital Admission: Y  -AS Side: Left  -AS Location: thigh  -AS              Wound 03/30/22 0340 perineum Pressure Injury    Wound - Properties Group Placement Date: 03/30/22  -AS Placement Time: 0340  -AS Present on Hospital Admission: Y  -AS Location: perineum  -AS Primary Wound Type: Pressure inj  -AS      Retired Wound - Properties Group Placement Date: 03/30/22  -AS Placement Time: 0340  -AS Present on Hospital Admission: Y  -AS Location: perineum  -AS Primary Wound Type: Pressure inj  -AS      Retired Wound - Properties Group Date first assessed: 03/30/22  -AS Time first assessed: 0340  -AS Present on Hospital Admission: Y  -AS Location: perineum  -AS Primary Wound Type: Pressure inj  -AS              Wound 01/20/22 1024 Left distal arm Incision    Wound - Properties Group Placement Date: 01/20/22  -EP Placement Time: 1024  -EP Present on Hospital Admission: N  -EP Side: Left  -EP Orientation: distal  -EP Location: arm  -EP Primary Wound Type: Incision  -EP      Retired Wound - Properties Group Placement Date: 01/20/22  -EP Placement Time: 1024  -EP Present on Hospital Admission: N  -EP Side: Left  -EP Orientation: distal  -EP Location: arm  -EP Primary Wound Type: Incision  -EP      Retired Wound - Properties Group Date first assessed: 01/20/22  -EP Time first assessed: 1024  -EP Present on Hospital Admission: N  -EP Side: Left  -EP Location: arm  -EP Primary Wound Type: Incision  -EP              Wound 03/30/22 0342 Right popliteal    Wound - Properties Group Placement Date: 03/30/22  -AS Placement Time: 0342  -AS Present on Hospital Admission: Y  -AS Side: Right  -AS Location: popliteal  -AS      Retired Wound - Properties Group Placement Date: 03/30/22  -AS Placement Time: 0342  -AS Present on Hospital Admission: Y  -AS Side: Right  -AS Location: popliteal  -AS      Retired Wound - Properties Group Date first assessed: 03/30/22  -AS Time first assessed: 0342  -AS Present on  Hospital Admission: Y  -AS Side: Right  -AS Location: popliteal  -AS              Wound 03/30/22 0348 Right anterior ankle    Wound - Properties Group Placement Date: 03/30/22  -AS Placement Time: 0348  -AS Present on Hospital Admission: Y  -AS Side: Right  -AS Orientation: anterior  -AS Location: ankle  -AS      Retired Wound - Properties Group Placement Date: 03/30/22  -AS Placement Time: 0348  -AS Present on Hospital Admission: Y  -AS Side: Right  -AS Orientation: anterior  -AS Location: ankle  -AS      Retired Wound - Properties Group Date first assessed: 03/30/22  -AS Time first assessed: 0348  -AS Present on Hospital Admission: Y  -AS Side: Right  -AS Location: ankle  -AS              Wound 03/30/22 0349 Left antecubital    Wound - Properties Group Placement Date: 03/30/22  -AS Placement Time: 0349  -AS Present on Hospital Admission: Y  -AS Side: Left  -AS Location: antecubital  -AS      Retired Wound - Properties Group Placement Date: 03/30/22  -AS Placement Time: 0349  -AS Present on Hospital Admission: Y  -AS Side: Left  -AS Location: antecubital  -AS      Retired Wound - Properties Group Date first assessed: 03/30/22  -AS Time first assessed: 0349  -AS Present on Hospital Admission: Y  -AS Side: Left  -AS Location: antecubital  -AS              Wound 04/09/22 1201 Right posterior elbow Skin Tear    Wound - Properties Group Placement Date: 04/09/22  -AD Placement Time: 1201  -AD Present on Hospital Admission: N  -AD Side: Right  -AD Orientation: posterior  -AD Location: elbow  -AD Primary Wound Type: Skin tear  -AD      Retired Wound - Properties Group Placement Date: 04/09/22  -AD Placement Time: 1201  -AD Present on Hospital Admission: N  -AD Side: Right  -AD Orientation: posterior  -AD Location: elbow  -AD Primary Wound Type: Skin tear  -AD      Retired Wound - Properties Group Date first assessed: 04/09/22  -AD Time first assessed: 1201  -AD Present on Hospital Admission: N  -AD Side: Right  -AD  Location: elbow  -AD Primary Wound Type: Skin tear  -AD            User Key  (r) = Recorded By, (t) = Taken By, (c) = Cosigned By    Initials Name Provider Type    Ariela Jimenez, RN Registered Nurse    Zainab Griffiths, RN Registered Nurse    Ashia Castaneda, RN Registered Nurse    Rayne Renae, RN Registered Nurse    JW Maranda Suresh, LPN Licensed Nurse    Frida Fallon, RN Registered Nurse    Radha Quispe RN Registered Nurse    AS Maricruz Mi, RN Registered Nurse    Noy Torres RN Registered Nurse                   Assessment/Plan    71-year-old gentleman with fluid overload and bacteremia     Active Hospital Problems:       Active Hospital Problems     Diagnosis     • **Fluid overload     • Chronic respiratory failure with hypoxia, on home oxygen therapy ([facility])     • Elevated troponin     • Hypothyroidism (acquired)     • End stage renal disease ([facility])     • Anemia of renal disease     • Mixed hyperlipidemia     • Major depressive disorder, recurrent, mild ([facility])     • Flaccid hemiplegia of right dominant side as late effect of cerebral infarction ([facility])     • COPD with acute exacerbation ([facility])     • S/P CABG (coronary artery bypass graft)     • Essential hypertension     • ANNETTE treated with BiPAP        Plan:      Shortness of breath secondary to fluid overload  Chronic respiratory failure with oxygen dependence   -CXR: small right greater than left pleural effusions.  Bibasilar airspace disease, right greater than left, favored to represent atelectasis.   -proBNP >70,000 (previous proBNP march 2022 was also >70,000)  -pt on his home rate of 3L O2 via [place]  -pt did not complete dialysis  -nephrology consulted with plans for stat dialysis appreciate assistance    Bacteremia with Streptococcus  ID consulted  IV antibiotics ordered     Chronically elevated troponin  -troponin 0.33 compared to troponin 0.146 on 3/12/2022.   -Will trend      ESRD on HD MWF  -on midodrine for BP  support  -nephrology to manage     CVA with right sided residual weakness  -on eliquis, statin     CAD s/p CABG, PCI  Chronic atrial fibrillation  Hyperlipidemia  -chronic, controlled.   -continue Eliquis, atorvastatin, and metoprolol     DM type 2  -hemoglobin A1c 6.3 on 3/12/2022 and previous admission basal insulin stopped due to hypoglycemia  -SSI AC/HS     Hypothyroidism  -Continue home synthroid     Major depressive disorder, recurrent, mild   Dementia  -Continue home zoloft, aricept     ANNETTE  -cpap qhs      Obesity (BMI 30-39.9)  BMI 36.92  -Lifestyle modifications to reduce cardiovascular risk factors        DVT prophylaxis: eliquis      CODE STATUS:    Admission Status:  I believe this patient meets observation status.     I discussed the patient's findings and my recommendations with patient.     This patient has been examined wearing appropriate Personal Protective Equipment.    05/07/22      Electronically signed by Sunny Shannon MD, 05/07/22, 17:07 EDT.  [facility] Hospitalist Team             Electronically signed by Sunny Shannon MD at 05/07/22 6215

## 2022-05-08 NOTE — PLAN OF CARE
Goal Outcome Evaluation:  Plan of Care Reviewed With: patient     Progress: improving   Patient is feeling better with the fluid taken off the other day.  Patient is getting antibiotic for the strep in the blood.  Patient has been quit pleasant this evening with no issues noted at this time. Has been sleeping well tonight.

## 2022-05-08 NOTE — CASE MANAGEMENT/SOCIAL WORK
Continued Stay Note  [facility]     Patient Name: Benson Mclean  MRN: [id number]  Today's Date: 5/8/2022    Admit Date: 5/6/2022     Discharge Plan     Row Name 05/08/22 1314       Plan    Plan From [facility]. OK to return per spouse. Awaiting response from [place]. May require precert. PASRR per facility.    Patient/Family in Agreement with Plan yes    Plan Comments Spoke with patient's spouse via phone to confirm it was ok for the patient to return to [place] at discharge. Contacted [place] to confirm that it is ok for the patient to return as; awaiting response.              Phone communication or documentation only - no physical contact with patient or family.    Cassandra [place] [address]  Office: [phone]  Fax: [phone]  [email or web address]

## 2022-05-08 NOTE — PLAN OF CARE
Goal Outcome Evaluation:   Pt seen for clinical swallow eval. Pt was upright in bed and was alert, responsive and able to follow all commands. Pt given trials of  ice chips, water by spoon and straw, cheesecake and a cracker. Pt is missing upper teeth but had functional mastication with good bolus control. Oral transit was timely for all liquid and puree trials. There was no pocketing or oral residual between bites. Digital palpation of swallow suggests timely initiation. There was no cough or other overt s/s of aspiration on any consistency assessed. Pt appears to tolerate a regular diet with thin liquids without overt s/s of aspiration. It is  rec that pt's diet be upgraded to regular with thin liquids. Pt should be a full feed and be fed small bites and sips at a slow rate.  ST will follow to assure tolerance of diet and make further recs as indicated.  -

## 2022-05-08 NOTE — PROGRESS NOTES
Name: Benson Mclean  Age: 71 y.o.  : 1950  Sex: male    22    Subjective  Patient feels well no complaints.     Interval History   No acute events overnight      Objective:    Vital Signs  Temp:  [97.6 °F (36.4 °C)-98.2 °F (36.8 °C)] 98.2 °F (36.8 °C)  Heart Rate:  [] 59  Resp:  [17-18] 18  BP: (101-122)/(67-79) 103/67        Intake/Output Summary (Last 24 hours) at 2022 1207  Last data filed at 2022 1833  Gross per 24 hour   Intake 410 ml   Output --   Net 410 ml           Physical Exam  Physical Exam  Constitutional:       General: He is not in acute distress.     Appearance: He is not diaphoretic.   HENT:      Head: Normocephalic and atraumatic.      Nose: Nose normal.   Eyes:      Pupils: Pupils are equal, round, and reactive to light.   Neck:      Thyroid: No thyromegaly.      Vascular: No JVD.      Trachea: No tracheal deviation.   Cardiovascular:      Rate and Rhythm: Normal rate and regular rhythm.      Heart sounds: No murmur heard.    No friction rub. No gallop.   Pulmonary:      Effort: Pulmonary effort is normal. No respiratory distress.      Breath sounds: No stridor. No wheezing or rales.   Chest:      Chest wall: No tenderness.   Abdominal:      General: Bowel sounds are normal.      Palpations: Abdomen is soft. There is no mass.      Tenderness: There is no abdominal tenderness. There is no guarding or rebound.      Hernia: No hernia is present.   Musculoskeletal:         General: No tenderness or deformity. Normal range of motion.      Cervical back: Normal range of motion and neck supple.   Skin:     General: Skin is warm and dry.      Coloration: Skin is not pale.      Findings: No rash.   Neurological:      Mental Status: He is alert and oriented to person, place, and time.      Cranial Nerves: No cranial nerve deficit.      Motor: No abnormal muscle tone.      Coordination: Coordination normal.      Deep Tendon Reflexes: Reflexes are normal and symmetric. Reflexes  normal.   Psychiatric:         Behavior: Behavior normal.         Thought Content: Thought content normal.         Judgment: Judgment normal.            Results Review:      Results from last 7 days   Lab Units 05/08/22 0446 05/07/22 0417 05/06/22  1045   SODIUM mmol/L 133* 134* 138   CO2 mmol/L 25.0 26.0 30.0*   BUN mg/dL 19 12 15   CREATININE mg/dL 2.94* 2.24* 2.74*   CALCIUM mg/dL 8.1* 8.1* 8.1*   ALBUMIN g/dL 2.60*  --  3.10*   AST (SGOT) U/L 12  --  11   ALT (SGPT) U/L 5  --  5   EGFR mL/min/1.73 22.1* 30.6* 24.0*       Results from last 7 days   Lab Units 05/08/22 0446 05/07/22 0417 05/06/22  1045   WBC 10*3/mm3 6.10 6.70 5.20       Imaging studies: I personally reviewed the patient's most recent pertinent imaging studies       Medication Review:   cefTRIAXone, 2 g, Intravenous, Q24H  insulin lispro, 0-7 Units, Subcutaneous, TID AC  sodium chloride, 10 mL, Intravenous, Q12H          Assessment  End-stage renal disease    Shortness of breath, patient has a history of chronic respiratory failure on home oxygen.  Mild volume overload on chest x-ray    Positive blood cultures  Patient growing strep    History of CVA    History of coronary artery disease    Diabetes      Plan:    Vital signs are stable    Labs reviewed BUN is 19 creatinine is 2.97 sodium 133 potassium 4.0.  ID consultation recommendations noted.  Pneumonia being ruled out.  Blood cultures positive for strep.  Results of repeat blood cultures are pending will await ID recommendations to see if tunneled dialysis catheter needs to be removed.    Hemodialysis tomorrow    Dinh Ceballos MD  05/08/22  12:07 EDT  Tel: [phone]  Fax: [phone]

## 2022-05-09 ENCOUNTER — INPATIENT HOSPITAL (AMBULATORY)
Dept: URBAN - METROPOLITAN AREA HOSPITAL 84 | Facility: HOSPITAL | Age: 72
End: 2022-05-09

## 2022-05-09 DIAGNOSIS — N18.6 END STAGE RENAL DISEASE: ICD-10-CM

## 2022-05-09 DIAGNOSIS — E87.1 HYPO-OSMOLALITY AND HYPONATREMIA: ICD-10-CM

## 2022-05-09 DIAGNOSIS — Z99.2 DEPENDENCE ON RENAL DIALYSIS: ICD-10-CM

## 2022-05-09 DIAGNOSIS — D64.9 ANEMIA, UNSPECIFIED: ICD-10-CM

## 2022-05-09 DIAGNOSIS — R11.0 NAUSEA: ICD-10-CM

## 2022-05-09 DIAGNOSIS — E83.39 OTHER DISORDERS OF PHOSPHORUS METABOLISM: ICD-10-CM

## 2022-05-09 DIAGNOSIS — F03.90 UNSPECIFIED DEMENTIA, UNSPECIFIED SEVERITY, WITHOUT BEHAVIOR: ICD-10-CM

## 2022-05-09 DIAGNOSIS — B95.4 OTHER STREPTOCOCCUS AS THE CAUSE OF DISEASES CLASSIFIED ELSE: ICD-10-CM

## 2022-05-09 PROBLEM — R78.81 BACTEREMIA: Status: ACTIVE | Noted: 2022-01-01

## 2022-05-09 PROBLEM — D50.0 NORMOCYTIC ANEMIA DUE TO BLOOD LOSS: Status: ACTIVE | Noted: 2022-01-01

## 2022-05-09 PROCEDURE — 99222 1ST HOSP IP/OBS MODERATE 55: CPT | Performed by: NURSE PRACTITIONER

## 2022-05-09 NOTE — NURSING NOTE
WOCN note:    71 yr old male admitted 5/6/22 with fluid overload. Patient has hx of CVA with right sided weakness, ESRD with HD, DM, COPD and CAD. WOCN consult received for multiple pressure injuries noted upon admission.     Patient presents with moisture associated skin damage to the christian rectal area and a deep tissue injury to the right heel. There is also a partial thickness wound to the left buttock area that is not consistent with pressure but is in an area of several linear scratches.   There are 2 wounds to the bilateral thighs that are covered with a crust.     Recommend initiation of pressure injury prevention measures and skin care with any episode of incontinence. Patient is currently on an Agiliti low air loss bed surface. Silicone foam dressings to thigh wounds and foam heel protector to the right heel.   We will continue to follow as needed.

## 2022-05-09 NOTE — PROGRESS NOTES
[facility] Medicine Services Daily Progress Note    Patient Name: Benson Mclean  : 1950  MRN: [id number]  Primary Care Physician:  Rudolph Rooney MD  Date of admission: 2022      Subjective        Chief Complaint: Shortness of breath fluid overload.           2022.  Patient is complaining that he cannot eat normal food because he is on a mechanical soft diet.  Speech therapy following      2022.  Patient was seen and examined.  Patient reports having a good night sleep.  Patient is on clear liquid diet now and n.p.o. after midnight.           ROS negative except as above    Objective      Vitals:   Temp:  [96.6 °F (35.9 °C)-98.2 °F (36.8 °C)] 97.2 °F (36.2 °C)  Heart Rate:  [] 120  Resp:  [18-20] 20  BP: (107-117)/(64-75) 107/70  Flow (L/min):  [2-3] 3    Physical Exam  Vitals reviewed.   Constitutional:   Patient appears chronically ill.     Comments: Chronically weak appearing  Frequent cough   HENT:      Head: Normocephalic.   Cardiovascular:      Rate and Rhythm: Normal rate.   Pulmonary:      Effort: Pulmonary effort is normal.   Abdominal:      General: Abdomen is flat.      Palpations: Abdomen is soft.   Musculoskeletal:         General: Normal range of motion.      Cervical back: Normal range of motion.   Skin:     General: Skin is warm.   Neurological:      General: No focal deficit present.      Mental Status: He is alert and oriented to person, place, and time.   Psychiatric:         Mood and Affect: Mood normal.         Behavior: Behavior normal.        Result Review    Result Review:  I have personally reviewed the results from the time of this admission to 2022 16:30 EDT and agree with these findings:  [x]  Laboratory  [x]  Microbiology  [x]  Radiology  []  EKG/Telemetry   []  Cardiology/Vascular   []  Pathology  []  Old records  []  Other:  Most notable findings include:     Radiology Results (last 21 days)    Procedure Component Value Units  Date/Time   CT Chest Without Contrast Diagnostic [[id number]] Vinh as Reviewed   Order Status: Completed Collected: 05/07/22 1740    Updated: 05/07/22 1758   Narrative:     Examination: CT CHEST WO CONTRAST DIAGNOSTIC-       Date of Exam: 5/7/2022 5:14 PM       Indication: Possible pneumonia; N18.6-End stage renal disease;   Z99.2-Dependence on renal dialysis; R06.00-Dyspnea, unspecified;   Z91.15-Patient's noncompliance with renal dialysis; P42-Dryrffa   effusion, not elsewhere classified.       Comparison: Correlation with chest radiograph 05/06/2022.       Technique: Non-contrast axial volumetric CT imaging of the chest was   performed. Automated exposure control and iterative reconstruction   methods were used.       Findings:   There is moderate to large right and small-to-moderate left pleural   effusion. There is complete atelectasis of the right lower lobe and   atelectasis of approximately 60% of the left lower lobe. There is   partial atelectasis of both upper lobes and the right middle lobe.   Overall, aerated lung occupies approximately 40% of the thoracic cavity.   There is a 7 mm nodule in the subpleural anterior left upper lobe on   axial image 36. There is no pneumothorax. Central airways are patent.   There is a small amount of mucoid debris in the right posterior trachea.       Thyroid, remainder of the trachea and esophagus appear within normal   limits. There is severe native coronary artery calcification. The   patient appears status post median sternotomy and CABG. The heart is   enlarged. There is diminished attenuation of the blood pool suggesting   anemia. The main pulmonary artery is enlarged up to 42 mm suggesting   increased right heart pressure. No pericardial effusion. No pathologic   mediastinal adenopathy.       There is skin thickening and edema in the midline upper back area there   is marked diffuse muscular atrophy. There is a small volume of   perihepatic ascites. There is a left  kidney cyst at the inferior pole   measuring 28 mm. There is an exophytic simple right kidney cyst   measuring 24 mm. The patient is status post cholecystectomy. There is   mild bilateral gynecomastia. There is no acute osseous abnormality or   destructive bone lesion. The bones appear demineralized. There are mild   degenerative changes.       Impression:            1. Moderate to large right and small-to-moderate left pleural effusions   with significant passive atelectasis. Aerated lung occupies   approximately 40% of the anatomic lung space.   2. Enlarged main pulmonary artery suggesting increased right heart   pressure.   3. Coronary artery disease status post CABG.   5. Marked atrophy of the musculature diffusely.   6. Gynecomastia.       Electronically Signed By-Rigoberto Hill MD On:5/7/2022 5:45 PM   This report was finalized on [id number] by  Rigoberto Hill MD.          Wounds (last 24 hours)     LDA Wound     Row Name 05/09/22 0701 05/1950          Wound 05/06/22 1706 Right heel    Wound - Properties Group Placement Date: 05/06/22  -MAGNO Placement Time: 1706 -JP Present on Hospital Admission: Y  -MAGNO Side: Right  -MAGNO Location: heel  -MAGNO     Closure Open to air  -EH Open to air  -AH     Base dry;red  -EH dry;red  -AH     Drainage Amount none  -EH none  -AH     Retired Wound - Properties Group Placement Date: 05/06/22  -MAGNO Placement Time: 1706 -JP Present on Hospital Admission: Y  -MAGNO Side: Right  -MAGNO Location: heel  -MAGNO     Retired Wound - Properties Group Date first assessed: 05/06/22  -MAGNO Time first assessed: 1706 -JP Present on Hospital Admission: Y  -MAGNO Side: Right  -MAGNO Location: heel  -MAGNO            Wound 03/30/22 0343 Left heel    Wound - Properties Group Placement Date: 03/30/22  -AS Placement Time: 0343  -AS Present on Hospital Admission: Y  -AS Side: Left  -AS Location: heel  -AS     Closure Open to air  -EH Open to air  -AH     Base dry  -EH dry  -AH     Retired Wound - Properties Group  Placement Date: 03/30/22  -AS Placement Time: 0343  -AS Present on Hospital Admission: Y  -AS Side: Left  -AS Location: heel  -AS     Retired Wound - Properties Group Date first assessed: 03/30/22  -AS Time first assessed: 0343  -AS Present on Hospital Admission: Y  -AS Side: Left  -AS Location: heel  -AS            Wound 03/07/22 1805 Right anterior thigh Burn    Wound - Properties Group Placement Date: 03/07/22  -MG Placement Time: 1805  -MG Present on Hospital Admission: Y  -MG Side: Right  -MG Orientation: anterior  -MG Location: thigh  -MG Primary Wound Type: Burn  -KK     Closure Open to air  -EH Open to air  -AH     Base scab  -EH scab  -AH     Retired Wound - Properties Group Placement Date: 03/07/22  -MG Placement Time: 1805  -MG Present on Hospital Admission: Y  -MG Side: Right  -MG Orientation: anterior  -MG Location: thigh  -MG Primary Wound Type: Burn  -KK     Retired Wound - Properties Group Date first assessed: 03/07/22  -MG Time first assessed: 1805  -MG Present on Hospital Admission: Y  -MG Side: Right  -MG Location: thigh  -MG Primary Wound Type: Burn  -KK            Wound 03/07/22 1806 Left anterior thigh Burn    Wound - Properties Group Placement Date: 03/07/22  -MG Placement Time: 1806  -MG Side: Left  -MG Orientation: anterior  -MG Location: thigh  -MG Primary Wound Type: Burn  -KK     Closure Open to air  -EH Open to air  -AH     Base scab  -EH scab  -AH     Retired Wound - Properties Group Placement Date: 03/07/22  -MG Placement Time: 1806  -MG Side: Left  -MG Orientation: anterior  -MG Location: thigh  -MG Primary Wound Type: Burn  -KK     Retired Wound - Properties Group Date first assessed: 03/07/22  -MG Time first assessed: 1806  -MG Side: Left  -MG Location: thigh  -MG Primary Wound Type: Burn  -KK            Wound 05/06/22 1710 Right anterior greater trochanter    Wound - Properties Group Placement Date: 05/06/22  -MAGNO Placement Time: 1710  -MAGNO Present on Hospital Admission: Y  -MAGNO Side:  Right  -MAGNO Orientation: anterior  -MAGNO Location: greater trochanter  -MAGNO     Closure Open to air  -EH Open to air  -AH     Base non-blanchable;moist;pink  -EH non-blanchable;moist;pink  -AH     Drainage Amount none  -EH none  -AH     Retired Wound - Properties Group Placement Date: 05/06/22  -MAGNO Placement Time: 1710  -MAGNO Present on Hospital Admission: Y  -MAGNO Side: Right  -MAGNO Orientation: anterior  -MAGNO Location: greater trochanter  -MAGNO     Retired Wound - Properties Group Date first assessed: 05/06/22  -MAGNO Time first assessed: 1710  -MAGNO Present on Hospital Admission: Y  -MAGNO Side: Right  -MAGNO Location: greater trochanter  -MAGNO            Wound 03/30/22 0341 Left posterior thigh    Wound - Properties Group Placement Date: 03/30/22  -AS Placement Time: 0341  -AS Present on Hospital Admission: Y  -AS Side: Left  -AS Orientation: posterior  -AS Location: thigh  -AS     Closure Open to air  -EH Open to air  -AH     Drainage Amount none  -EH none  -AH     Retired Wound - Properties Group Placement Date: 03/30/22  -AS Placement Time: 0341  -AS Present on Hospital Admission: Y  -AS Side: Left  -AS Orientation: posterior  -AS Location: thigh  -AS     Retired Wound - Properties Group Date first assessed: 03/30/22  -AS Time first assessed: 0341  -AS Present on Hospital Admission: Y  -AS Side: Left  -AS Location: thigh  -AS            Wound 03/30/22 0340 perineum Pressure Injury    Wound - Properties Group Placement Date: 03/30/22  -AS Placement Time: 0340  -AS Present on Hospital Admission: Y  -AS Location: perineum  -AS Primary Wound Type: Pressure inj  -AS     Closure Open to air  -EH Open to air  -AH     Base non-blanchable;moist;pink  -EH non-blanchable;moist;pink  -AH     Drainage Amount none  -EH none  -AH     Retired Wound - Properties Group Placement Date: 03/30/22  -AS Placement Time: 0340  -AS Present on Hospital Admission: Y  -AS Location: perineum  -AS Primary Wound Type: Pressure inj  -AS     Retired Wound - Properties  Group Date first assessed: 03/30/22  -AS Time first assessed: 0340  -AS Present on Hospital Admission: Y  -AS Location: perineum  -AS Primary Wound Type: Pressure inj  -AS            Wound 01/20/22 1024 Left distal arm Incision    Wound - Properties Group Placement Date: 01/20/22  -EP Placement Time: 1024  -EP Present on Hospital Admission: N  -EP Side: Left  -EP Orientation: distal  -EP Location: arm  -EP Primary Wound Type: Incision  -EP     Retired Wound - Properties Group Placement Date: 01/20/22  -EP Placement Time: 1024  -EP Present on Hospital Admission: N  -EP Side: Left  -EP Orientation: distal  -EP Location: arm  -EP Primary Wound Type: Incision  -EP     Retired Wound - Properties Group Date first assessed: 01/20/22  -EP Time first assessed: 1024  -EP Present on Hospital Admission: N  -EP Side: Left  -EP Location: arm  -EP Primary Wound Type: Incision  -EP            Wound 03/30/22 0342 Right popliteal    Wound - Properties Group Placement Date: 03/30/22  -AS Placement Time: 0342  -AS Present on Hospital Admission: Y  -AS Side: Right  -AS Location: popliteal  -AS     Retired Wound - Properties Group Placement Date: 03/30/22  -AS Placement Time: 0342  -AS Present on Hospital Admission: Y  -AS Side: Right  -AS Location: popliteal  -AS     Retired Wound - Properties Group Date first assessed: 03/30/22  -AS Time first assessed: 0342  -AS Present on Hospital Admission: Y  -AS Side: Right  -AS Location: popliteal  -AS            Wound 03/30/22 0348 Right anterior ankle    Wound - Properties Group Placement Date: 03/30/22  -AS Placement Time: 0348  -AS Present on Hospital Admission: Y  -AS Side: Right  -AS Orientation: anterior  -AS Location: ankle  -AS     Retired Wound - Properties Group Placement Date: 03/30/22  -AS Placement Time: 0348  -AS Present on Hospital Admission: Y  -AS Side: Right  -AS Orientation: anterior  -AS Location: ankle  -AS     Retired Wound - Properties Group Date first assessed: 03/30/22   -AS Time first assessed: 0348  -AS Present on Hospital Admission: Y  -AS Side: Right  -AS Location: ankle  -AS            Wound 03/30/22 0349 Left antecubital    Wound - Properties Group Placement Date: 03/30/22  -AS Placement Time: 0349  -AS Present on Hospital Admission: Y  -AS Side: Left  -AS Location: antecubital  -AS     Retired Wound - Properties Group Placement Date: 03/30/22  -AS Placement Time: 0349  -AS Present on Hospital Admission: Y  -AS Side: Left  -AS Location: antecubital  -AS     Retired Wound - Properties Group Date first assessed: 03/30/22  -AS Time first assessed: 0349  -AS Present on Hospital Admission: Y  -AS Side: Left  -AS Location: antecubital  -AS            Wound 04/09/22 1201 Right posterior elbow Skin Tear    Wound - Properties Group Placement Date: 04/09/22  -AD Placement Time: 1201  -AD Present on Hospital Admission: N  -AD Side: Right  -AD Orientation: posterior  -AD Location: elbow  -AD Primary Wound Type: Skin tear  -AD     Retired Wound - Properties Group Placement Date: 04/09/22  -AD Placement Time: 1201  -AD Present on Hospital Admission: N  -AD Side: Right  -AD Orientation: posterior  -AD Location: elbow  -AD Primary Wound Type: Skin tear  -AD     Retired Wound - Properties Group Date first assessed: 04/09/22  -AD Time first assessed: 1201  -AD Present on Hospital Admission: N  -AD Side: Right  -AD Location: elbow  -AD Primary Wound Type: Skin tear  -AD           User Key  (r) = Recorded By, (t) = Taken By, (c) = Cosigned By    Initials Name Provider Type    Ariela Jimenez RN Registered Nurse    Zainab Griffiths RN Registered Nurse    Phoebe Ford, RN Registered Nurse    Ashia Castaneda RN Registered Nurse    Guy Shen LPN Licensed Nurse    Radha Quispe RN Registered Nurse    AS Maricruz Mi, RN Registered Nurse    Noy Torres, RN Registered Nurse                     Assessment/Plan    71-year-old gentleman with fluid overload and  bacteremia     Active Hospital Problems:          Active Hospital Problems     Diagnosis     • **Fluid overload     • Chronic respiratory failure with hypoxia, on home oxygen therapy ([facility])     • Elevated troponin     • Hypothyroidism (acquired)     • End stage renal disease ([facility])     • Anemia of renal disease     • Mixed hyperlipidemia     • Major depressive disorder, recurrent, mild ([facility])     • Flaccid hemiplegia of right dominant side as late effect of cerebral infarction ([facility])     • COPD with acute exacerbation ([facility])     • S/P CABG (coronary artery bypass graft)     • Essential hypertension     • ANNETTE treated with BiPAP        Plan:      Shortness of breath secondary to fluid overload  Chronic respiratory failure with oxygen dependence    -Improving.  -CXR: small right greater than left pleural effusions.  Bibasilar airspace disease, right greater than left, favored to represent atelectasis.   -proBNP >70,000 (previous proBNP march 2022 was also >70,000)  -pt on his home rate of 3L O2 via [place]  -pt did not complete dialysis  -nephrology consulted appreciate assistance     Bacteremia with Streptococcus  ID consulted  IV antibiotics ordered  Pending culture of dialysis catheter  Pending final identification  Echo pending  Appreciate ID assistance    Chronically elevated troponin  -troponin 0.33 compared to troponin 0.146 on 3/12/2022.   -Will trend      ESRD on HD MWF  -on midodrine for BP support  -nephrology to manage     CVA with right sided residual weakness  -on eliquis, statin     CAD s/p CABG, PCI  Chronic atrial fibrillation  Hyperlipidemia  -chronic, controlled.   -continue Eliquis, atorvastatin, and metoprolol     DM type 2  -hemoglobin A1c 6.3 on 3/12/2022 and previous admission basal insulin stopped due to hypoglycemia  -SSI AC/HS     Hypothyroidism  -Continue home synthroid     Major depressive disorder, recurrent, mild   Dementia  -Continue home zoloft, aricept     ANNETTE  -cpap qhs      Obesity (BMI  30-39.9)  BMI 36.92  -Lifestyle modifications to reduce cardiovascular risk factors     Dysphagia  Patient complaining about his current diet.  Speech following.     DVT prophylaxis: eliquis      CODE STATUS:    Admission Status:  I believe this patient meets observation status.     I discussed the patient's findings and my recommendations with patient.     This patient has been examined wearing appropriate Personal Protective Equipment.    05/09/22      Electronically signed by Jeff Black MD, FACP, 05/09/22, 16:30 EDT.      [facility] Hospitalist Team

## 2022-05-09 NOTE — CASE MANAGEMENT/SOCIAL WORK
Continued Stay Note   [place]     Patient Name: Benson Mclean  MRN: [id number]  Today's Date: 5/9/2022    Admit Date: 5/6/2022     Discharge Plan     Row Name 05/09/22 1232       Plan    Plan DC Plan: Return to [facility]. OK to return per spouse/ facilty liaison. No precert needed. PASRR per facility.    Plan Comments Spoke with [place] liaisonMarciruz who confirmed that the patient will be able to return LTC under medicaid . No precert needed. Verified pharmacy in computer for [facility].            Phone communication or documentation only- no physical contact with patient or family.    Shanice Kemp RN     Office Phone: [phone]  Office Cell: [phone]

## 2022-05-09 NOTE — PROGRESS NOTES
Infectious Diseases Progress Note      LOS: 0 days   Patient Care Team:  Rudolph Rooney MD as PCP - General (Family Medicine)  Samantha Zabala APRN as PCP - Family Medicine  Jose G Rm MD as Consulting Physician (Cardiology)    Chief Complaint: Shortness of breath    Subjective       The patient has been afebrile for the last 24 hours.  The patient is on 2 L of oxygen by nasal cannula, hemodynamically stable, and is tolerating antimicrobial therapy.  The patient was seen and examined at the dialysis unit      Review of Systems:   Review of Systems   Constitutional: Positive for fatigue.   HENT: Negative.    Eyes: Negative.    Respiratory: Positive for shortness of breath.    Cardiovascular: Negative.    Gastrointestinal: Negative.    Endocrine: Negative.    Genitourinary: Negative.    Musculoskeletal: Negative.    Skin: Negative.    Neurological: Negative.    Psychiatric/Behavioral: Negative.    All other systems reviewed and are negative.       Objective     Vital Signs  Temp:  [96.6 °F (35.9 °C)-98.2 °F (36.8 °C)] 97.2 °F (36.2 °C)  Heart Rate:  [] 120  Resp:  [18-20] 20  BP: (107-117)/(64-75) 107/70    Physical Exam:  Physical Exam  Vitals and nursing note reviewed.   Constitutional:       General: He is not in acute distress.     Appearance: Normal appearance. He is well-developed and normal weight. He is not diaphoretic.   HENT:      Head: Normocephalic and atraumatic.   Eyes:      Conjunctiva/sclera: Conjunctivae normal.      Pupils: Pupils are equal, round, and reactive to light.   Cardiovascular:      Rate and Rhythm: Normal rate and regular rhythm.      Heart sounds: Normal heart sounds, S1 normal and S2 normal.   Pulmonary:      Effort: Pulmonary effort is normal. No respiratory distress.      Breath sounds: No stridor. Rales present. No wheezing.   Abdominal:      General: Bowel sounds are normal. There is no distension.      Palpations: Abdomen is soft. There is no mass.      Tenderness:  There is no abdominal tenderness. There is no guarding.   Musculoskeletal:         General: No deformity. Normal range of motion.      Cervical back: Neck supple.   Skin:     General: Skin is warm and dry.      Coloration: Skin is not pale.      Findings: No erythema or rash.   Neurological:      Mental Status: He is alert and oriented to person, place, and time.      Cranial Nerves: No cranial nerve deficit.   Psychiatric:         Mood and Affect: Mood normal.          Results Review:    I have reviewed all clinical data, test, lab, and imaging results.     Radiology  No Radiology Exams Resulted Within Past 24 Hours    Cardiology    Laboratory    Results from last 7 days   Lab Units 05/09/22  0528 05/08/22 0446 05/07/22 0417 05/06/22  1045   WBC 10*3/mm3 6.90 6.10 6.70 5.20   HEMOGLOBIN g/dL 10.8* 10.9* 10.9* 11.0*   HEMATOCRIT % 34.6* 35.8* 35.7* 35.8*   PLATELETS 10*3/mm3 200 173 163 203     Results from last 7 days   Lab Units 05/09/22 0228 05/08/22 0446 05/07/22 0417 05/06/22  1045   SODIUM mmol/L 130* 133* 134* 138   POTASSIUM mmol/L 4.0 4.0 3.9 3.8   CHLORIDE mmol/L 93* 97* 97* 95*   CO2 mmol/L 24.0 25.0 26.0 30.0*   BUN mg/dL 24* 19 12 15   CREATININE mg/dL 3.39* 2.94* 2.24* 2.74*   GLUCOSE mg/dL 133* 116* 164* 99   ALBUMIN g/dL  --  2.60*  --  3.10*   BILIRUBIN mg/dL  --  0.4  --  0.7   ALK PHOS U/L  --  52  --  59   AST (SGOT) U/L  --  12  --  11   ALT (SGPT) U/L  --  5  --  5   CALCIUM mg/dL 7.9* 8.1* 8.1* 8.1*                 Microbiology   Microbiology Results (last 10 days)     Procedure Component Value - Date/Time    Blood Culture - Blood, Hand, Right [[id number]]  (Normal) Collected: 05/07/22 1519    Lab Status: Preliminary result Specimen: Blood from Hand, Right Updated: 05/08/22 1617     Blood Culture No growth at 24 hours    CANDIDA AURIS SCREEN - Swab, Axilla Right, Axilla Left and Groin [[id number]]  (Normal) Collected: 05/07/22 1020    Lab Status: Preliminary result Specimen: Swab from  Axilla Right, Axilla Left and Groin Updated: 05/09/22 1030     Candida Auris Screen Culture No Candida auris isolated at 2 days    COVID PRE-OP / PRE-PROCEDURE SCREENING ORDER (NO ISOLATION) - Swab, Nasopharynx [[id number]]  (Normal) Collected: 05/06/22 1519    Lab Status: Final result Specimen: Swab from Nasopharynx Updated: 05/06/22 1542    Narrative:      The following orders were created for panel order COVID PRE-OP / PRE-PROCEDURE SCREENING ORDER (NO ISOLATION) - Swab, Nasopharynx.  Procedure                               Abnormality         Status                     ---------                               -----------         ------                     COVID-19,CEPHEID/ROHAN,CO...[[id number]]  Normal              Final result                 Please view results for these tests on the individual orders.    COVID-19,CEPHEID/ROHAN,COR/ZEYNEP/PAD/BEATRICE IN-HOUSE(OR EMERGENT/ADD-ON),NP SWAB IN TRANSPORT MEDIA 3-4 HR TAT, RT-PCR - Swab, Nasopharynx [[id number]]  (Normal) Collected: 05/06/22 1519    Lab Status: Final result Specimen: Swab from Nasopharynx Updated: 05/06/22 1542     COVID19 Not Detected    Narrative:      Fact sheet for providers: https://www.fda.gov/media/383916/download     Fact sheet for patients: https://www.fda.gov/media/207141/download  Fact sheet for providers: https://www.fda.gov/media/007771/download    Fact sheet for patients: https://www.fda.gov/media/787922/download    Test performed by PCR.    Blood Culture - Blood, Hand, Right [[id number]]  (Abnormal)  (Susceptibility) Collected: 05/06/22 1116    Lab Status: Final result Specimen: Blood from Hand, Right Updated: 05/09/22 0703     Blood Culture Streptococcus gallolyticus ssp pasteurianus     Isolated from Aerobic and Anaerobic Bottles     Gram Stain Anaerobic Bottle Gram positive cocci      Aerobic Bottle Gram positive cocci    Susceptibility      Streptococcus gallolyticus ssp pasteurianus      MONROE      Ceftriaxone Susceptible      Penicillin G  Susceptible      Vancomycin Susceptible                           Blood Culture ID, PCR - Blood, Hand, Right [[id number]]  (Abnormal) Collected: 05/06/22 1116    Lab Status: Final result Specimen: Blood from Hand, Right Updated: 05/07/22 0216     BCID, PCR Streptococcus spp, not A, B, or pneumonia. Identification by BCID2 PCR.     BOTTLE TYPE Anaerobic Bottle    Blood Culture - Blood, Arm, Right [[id number]]  (Abnormal) Collected: 05/06/22 1057    Lab Status: Final result Specimen: Blood from Arm, Right Updated: 05/09/22 0704     Blood Culture Streptococcus gallolyticus ssp pasteurianus     Isolated from Aerobic and Anaerobic Bottles     Gram Stain Anaerobic Bottle Gram positive cocci      Aerobic Bottle Gram positive cocci    Narrative:      Refer to previous blood culture collected on 5/6/2022 1116 for MICs.            Medication Review:       Schedule Meds  atorvastatin, 40 mg, Oral, Nightly  bisacodyl, 20 mg, Oral, Once  budesonide, 0.5 mg, Nebulization, BID  cefTRIAXone, 2 g, Intravenous, Q24H  donepezil, 10 mg, Oral, Nightly  insulin lispro, 0-7 Units, Subcutaneous, TID AC  ipratropium-albuterol, 3 mL, Nebulization, TID - RT  levothyroxine, 50 mcg, Oral, Q AM  metoprolol tartrate, 37.5 mg, Oral, BID  midodrine, 10 mg, Oral, TID AC  pantoprazole, 40 mg, Oral, QAM  polyethylene glycol, 17 g, Oral, BID  sertraline, 50 mg, Oral, Daily  sodium chloride, 10 mL, Intravenous, Q12H  sodium-potassium-magnesium sulfates, 1 bottle, Oral, Q12H        Infusion Meds       PRN Meds  •  acetaminophen **OR** acetaminophen **OR** acetaminophen  •  aluminum-magnesium hydroxide-simethicone  •  dextrose  •  dextrose  •  glucagon (human recombinant)  •  insulin lispro **AND** insulin lispro  •  ipratropium-albuterol  •  melatonin  •  ondansetron **OR** ondansetron  •  sodium chloride  •  sodium chloride        Assessment/Plan       Antimicrobial Therapy   1.  IV  ceftriaxone        2.        3.        4.        5.            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  We need to rule out endocarditis or GI malignancy     The patient presented hospital with shortness of breath and chest x-ray showed density at the right base.  Need to rule out pneumonia  -CT showed some large bilateral pleural effusions but no obvious pneumonia which most likely secondary to volume overload  -Patient is currently on 2 L of oxygen by cannula     End-stage renal disease on hemodialysis.  Patient had right chest tunneled dialysis catheter and left arm AV fistula     History of CVA     Plan     Continue ceftriaxone 2 g IV daily  Consult cardiology service for transesophageal echo  Consult GI service to evaluate patient for colonoscopy  Supportive care  A.m. labs        Radha Jack MD  05/09/22  15:18 EDT    Note is dictated utilizing voice recognition software/Dragon

## 2022-05-09 NOTE — PLAN OF CARE
Problem: Adult Inpatient Plan of Care  Goal: Absence of Hospital-Acquired Illness or Injury  Intervention: Identify and Manage Fall Risk  Description: Perform standard risk assessment on admission using a validated tool or comprehensive approach appropriate to the patient; reassess fall risk frequently, with change in status or transfer to another level of care.  Communicate fall injury risk to interprofessional healthcare team.  Determine need for increased observation, equipment and environmental modification, such as low bed, signage and supportive, nonskid footwear.  Adjust safety measures to individual developmental age, stage and identified risk factors.  Reinforce the importance of safety and physical activity with patient and family.  Perform regular intentional rounding to assess need for position change, pain assessment and personal needs, including assistance with toileting.  Recent Flowsheet Documentation  Taken 5/9/2022 0100 by Guy Cano LPN  Safety Promotion/Fall Prevention:   safety round/check completed   room organization consistent   nonskid shoes/slippers when out of bed   muscle strengthening facilitated   mobility aid in reach   lighting adjusted   fall prevention program maintained   clutter free environment maintained   assistive device/personal items within reach   activity supervised  Taken 5/8/2022 2310 by Guy Cano LPN  Safety Promotion/Fall Prevention:   safety round/check completed   room organization consistent   nonskid shoes/slippers when out of bed   muscle strengthening facilitated   mobility aid in reach   lighting adjusted   fall prevention program maintained   elopement precautions   clutter free environment maintained   assistive device/personal items within reach   activity supervised  Taken 5/8/2022 2100 by Guy Cano LPN  Safety Promotion/Fall Prevention:   safety round/check completed   room organization consistent   nonskid shoes/slippers  when out of bed   muscle strengthening facilitated   mobility aid in reach   lighting adjusted   fall prevention program maintained   clutter free environment maintained   activity supervised   assistive device/personal items within reach  Taken 5/8/2022 1950 by Guy Cano LPN  Safety Promotion/Fall Prevention:   safety round/check completed   room organization consistent   nonskid shoes/slippers when out of bed   muscle strengthening facilitated   mobility aid in reach   lighting adjusted   fall prevention program maintained   elopement precautions   clutter free environment maintained   assistive device/personal items within reach   activity supervised  Intervention: Prevent Skin Injury  Description: Perform a screening for skin injury risk, such as pressure or moisture associated skin damage on admission and at regular intervals throughout hospital stay.  Keep all areas of skin (especially folds) clean and dry.  Maintain adequate skin hydration.  Relieve and redistribute pressure and protect bony prominences; implement measures based on patient-specific risk factors.  Match turning and repositioning schedule to clinical condition.  Encourage weight shift frequently; assist with reposition if unable to complete independently.  Float heels off bed; avoid pressure on the Achilles tendon.  Keep skin free from extended contact with medical devices.  Encourage functional activity and mobility, as early as tolerated.  Use aids (e.g., slide boards, mechanical lift) during transfer.  Recent Flowsheet Documentation  Taken 5/8/2022 2310 by Guy Cano LPN  Body Position:   turned   head facing, left  Taken 5/8/2022 2100 by Guy Cano LPN  Body Position: patient/family refused  Taken 5/8/2022 1950 by Guy Cano LPN  Body Position:   weight shifting   patient/family refused  Intervention: Prevent and Manage VTE (Venous Thromboembolism) Risk  Description: Assess for VTE (venous  thromboembolism) risk.  Encourage and assist with early ambulation.  Initiate and maintain compression or other therapy, as indicated, based on identified risk in accordance with organizational protocol and provider order.  Encourage both active and passive leg exercises while in bed, if unable to ambulate.  Recent Flowsheet Documentation  Taken 5/9/2022 0100 by Guy Cano LPN  Activity Management:   bedrest   activity encouraged  Taken 5/8/2022 2310 by Guy Cano LPN  Activity Management: activity encouraged  Taken 5/8/2022 2100 by Guy Cano LPN  Activity Management: activity encouraged  Taken 5/8/2022 1950 by Guy Cano LPN  Activity Management:   activity adjusted per tolerance   bedrest  VTE Prevention/Management: patient refused intervention  Intervention: Prevent Infection  Description: Maintain skin and mucous membrane integrity; promote hand, oral and pulmonary hygiene.  Optimize fluid balance, nutrition, sleep and glycemic control to maximize infection resistance.  Identify potential sources of infection early to prevent or mitigate progression of infection (e.g., wound, lines, devices).  Evaluate ongoing need for invasive devices; remove promptly when no longer indicated.  Recent Flowsheet Documentation  Taken 5/9/2022 0100 by Guy Cano LPN  Infection Prevention:   visitors restricted/screened   single patient room provided   rest/sleep promoted   personal protective equipment utilized   hand hygiene promoted  Taken 5/8/2022 2310 by Guy Cano LPN  Infection Prevention:   visitors restricted/screened   single patient room provided   rest/sleep promoted   personal protective equipment utilized   hand hygiene promoted  Taken 5/8/2022 2100 by Guy Cano LPN  Infection Prevention:   visitors restricted/screened   single patient room provided   rest/sleep promoted   personal protective equipment utilized   hand hygiene promoted  Taken  5/8/2022 1950 by Guy Cano LPN  Infection Prevention:   visitors restricted/screened   single patient room provided   rest/sleep promoted   personal protective equipment utilized   hand hygiene promoted     Problem: Fall Injury Risk  Goal: Absence of Fall and Fall-Related Injury  Intervention: Identify and Manage Contributors  Description: Develop a fall prevention plan with the patient and caregiver/family.  Provide reorientation, appropriate sensory stimulation and routines with changes in mental status to decrease risk of fall.  Promote use of personal vision and auditory aids.  Assess assistance level required for safe and effective self-care; provide support as needed, such as toileting, mobilization. For age 65 and older, implement timed toileting with assistance.  Encourage physical activity, such as performance of mobility and self-care at highest level of patient ability, multicomponent exercise program and provision of appropriate assistive devices.  If fall occurs, assess the severity of injury; implement fall injury protocol. Determine the cause and revise fall injury prevention plan.  Regularly review medication contribution to fall risk; adjust medication administration times to minimize risk of falling.  Consider risk related to polypharmacy and age.  Balance adequate pain management with potential for oversedation.  Recent Flowsheet Documentation  Taken 5/9/2022 0100 by Guy Cano LPN  Medication Review/Management: medications reviewed  Taken 5/8/2022 2310 by Guy Cano LPN  Medication Review/Management: medications reviewed  Taken 5/8/2022 1950 by Guy Cano LPN  Medication Review/Management: medications reviewed  Intervention: Promote Injury-Free Environment  Description: Provide a safe, barrier-free environment that encourages independent activity.  Keep care area uncluttered and well-lighted.  Determine need for increased observation or monitoring.  Avoid  use of devices that minimize mobility, such as restraints or indwelling urinary catheter.  Recent Flowsheet Documentation  Taken 5/9/2022 0100 by Guy Cano LPN  Safety Promotion/Fall Prevention:   safety round/check completed   room organization consistent   nonskid shoes/slippers when out of bed   muscle strengthening facilitated   mobility aid in reach   lighting adjusted   fall prevention program maintained   clutter free environment maintained   assistive device/personal items within reach   activity supervised  Taken 5/8/2022 2310 by Guy Cano LPN  Safety Promotion/Fall Prevention:   safety round/check completed   room organization consistent   nonskid shoes/slippers when out of bed   muscle strengthening facilitated   mobility aid in reach   lighting adjusted   fall prevention program maintained   elopement precautions   clutter free environment maintained   assistive device/personal items within reach   activity supervised  Taken 5/8/2022 2100 by Guy Cano LPN  Safety Promotion/Fall Prevention:   safety round/check completed   room organization consistent   nonskid shoes/slippers when out of bed   muscle strengthening facilitated   mobility aid in reach   lighting adjusted   fall prevention program maintained   clutter free environment maintained   activity supervised   assistive device/personal items within reach  Taken 5/8/2022 1950 by Guy Cano LPN  Safety Promotion/Fall Prevention:   safety round/check completed   room organization consistent   nonskid shoes/slippers when out of bed   muscle strengthening facilitated   mobility aid in reach   lighting adjusted   fall prevention program maintained   elopement precautions   clutter free environment maintained   assistive device/personal items within reach   activity supervised     Problem: Skin Injury Risk Increased  Goal: Skin Health and Integrity  Intervention: Optimize Skin Protection  Description: Perform a  full pressure injury risk assessment, as indicated by screening, upon admission to care unit.  Reassess skin (injury risk, full inspection) frequently (e.g., scheduled interval, with change in condition) to provide optimal early detection and prevention.  Maintain adequate tissue perfusion (e.g., encourage fluid balance; avoid crossing legs, constrictive clothing or devices) to promote tissue oxygenation.  Maintain head of bed at lowest degree of elevation tolerated, considering medical condition and other restrictions.  Avoid positioning onto an area that remains reddened.  Minimize incontinence and moisture (e.g., toileting schedule; moisture-wicking pad, diaper or incontinence collection device; skin moisture barrier).  Cleanse skin promptly and gently when soiled utilizing a pH-balanced cleanser.  Relieve and redistribute pressure (e.g., scheduled position changes, weight shifts, use of support surface, medical device repositioning, protective dressing application, use of positioning device, microclimate control, use of pressure-injury-monitor  Encourage increased activity, such as sitting in a chair at the bedside or early mobilization, when able to tolerate.  Recent Flowsheet Documentation  Taken 5/9/2022 0100 by Guy Cano LPN  Head of Bed (HOB) Positioning: HOB at 20-30 degrees  Taken 5/8/2022 2310 by Guy Cano LPN  Head of Bed (HOB) Positioning: HOB at 20-30 degrees  Pressure Reduction Devices: specialty bed utilized  Taken 5/8/2022 2100 by Guy Cano LPN  Head of Bed (HOB) Positioning: HOB at 20-30 degrees  Pressure Reduction Devices: specialty bed utilized  Taken 5/8/2022 1950 by Guy Cano LPN  Pressure Reduction Techniques: frequent weight shift encouraged  Head of Bed (HOB) Positioning: HOB at 20-30 degrees  Pressure Reduction Devices: specialty bed utilized     Problem: Skin Injury Risk Increased  Goal: Skin Health and Integrity  Intervention: Optimize Skin  Protection  Description: Perform a full pressure injury risk assessment, as indicated by screening, upon admission to care unit.  Reassess skin (injury risk, full inspection) frequently (e.g., scheduled interval, with change in condition) to provide optimal early detection and prevention.  Maintain adequate tissue perfusion (e.g., encourage fluid balance; avoid crossing legs, constrictive clothing or devices) to promote tissue oxygenation.  Maintain head of bed at lowest degree of elevation tolerated, considering medical condition and other restrictions.  Avoid positioning onto an area that remains reddened.  Minimize incontinence and moisture (e.g., toileting schedule; moisture-wicking pad, diaper or incontinence collection device; skin moisture barrier).  Cleanse skin promptly and gently when soiled utilizing a pH-balanced cleanser.  Relieve and redistribute pressure (e.g., scheduled position changes, weight shifts, use of support surface, medical device repositioning, protective dressing application, use of positioning device, microclimate control, use of pressure-injury-monitor  Encourage increased activity, such as sitting in a chair at the bedside or early mobilization, when able to tolerate.  Recent Flowsheet Documentation  Taken 5/9/2022 0100 by Guy Cano LPN  Head of Bed (HOB) Positioning: HOB at 20-30 degrees  Taken 5/8/2022 2310 by Guy Cano LPN  Head of Bed (HOB) Positioning: HOB at 20-30 degrees  Pressure Reduction Devices: specialty bed utilized  Taken 5/8/2022 2100 by Guy Cano LPN  Head of Bed (HOB) Positioning: HOB at 20-30 degrees  Pressure Reduction Devices: specialty bed utilized  Taken 5/8/2022 1950 by Guy Cano LPN  Pressure Reduction Techniques: frequent weight shift encouraged  Head of Bed (HOB) Positioning: HOB at 20-30 degrees  Pressure Reduction Devices: specialty bed utilized     Problem: COPD (Chronic Obstructive Pulmonary Disease)  Comorbidity  Goal: Maintenance of COPD Symptom Control  Intervention: Maintain COPD-Symptom Control  Description: Evaluate adherence to management plan (e.g., medication, trigger avoidance, infection prevention, self-monitoring).  Advocate for continuation of home regimen, including medication, method of delivery, schedule and symptom monitoring.  Anticipate the need for breathing techniques and activity pacing to minimize fatigue and breathlessness.  Assess for proper use of inhaled medication and delivery technique; assist or reinstruct if needed.  Evaluate effectiveness of coping skills; encourage expression of feelings, expectations and concerns related to disease management and quality of life; reinforce education to enhance management plan and wellbeing.  Recent Flowsheet Documentation  Taken 5/9/2022 0100 by Guy Cano LPN  Medication Review/Management: medications reviewed  Taken 5/8/2022 2310 by Guy Cano LPN  Medication Review/Management: medications reviewed  Taken 5/8/2022 1950 by Guy Cnao LPN  Medication Review/Management: medications reviewed   Goal Outcome Evaluation:

## 2022-05-09 NOTE — CONSULTS
Referring Provider: Jeff Black MD  Reason for Consultation:  Shortness of breath    Patient Care Team:  Rudolph Rooney MD as PCP - General (Family Medicine)  Samantha Zabala APRN as PCP - Family Medicine  Jose G Rm MD as Consulting Physician (Cardiology)    Chief complaint  Shortness of breath    Subjective .     History of present illness:  Benson Mclean is a 71 y.o. male who presents with history of multiple medical and cardiac problems was admitted to the hospital with history of increasing shortness of breath.  Patient denies having any fever cough chills.  Patient had 2 sets of blood cultures which were positive for Streptococcus.  No other associated aggravating or elevating factors.  Patient has hemodialysis.  Patient has left arm AV fistula.  Patient has tunneled dialysis catheter.  No other associated aggravating or elevating factors.  Cardiology consultation was requested..     ROS      Patient is not having any chest discomfort palpitations, dizziness or syncope.  Denies having any headache ,abdominal pain ,nausea, vomiting , diarrhea constipation, loss of weight or loss of appetite.  Denies having any excessive bruising ,hematuria or blood in the stool.    Review of all systems negative except as indicated      History  Past Medical History:   Diagnosis Date   • A-fib ([facility]) 8/3/2021   • Anemia    • Appetite loss    • Arthritis    • Brain bleed ([facility])    • Carpal tunnel syndrome on left    • CHF (congestive heart failure) ([facility])    • Chronic left-sided low back pain without sciatica 12/27/2017   • CKD (chronic kidney disease) stage 3, GFR 30-59 ml/min ([facility])    • Closed fracture of seventh thoracic vertebra ([facility]) 8/23/2020   • Cognitive communication deficit 12/1/2020   • COPD (chronic obstructive pulmonary disease) ([facility])    • Coronary artery disease    • COVID-19 2/19/2021   • Cytokine release syndrome, grade 1 5/24/2021   • Depression    • Diabetic neuropathy ([facility])    • Dialysis  patient ([facility])     mon wed fri   • DM2 (diabetes mellitus, type 2) ([facility])    • GERD (gastroesophageal reflux disease)    • Hyperlipidemia    • Hypertension    • Hypogonadism in male    • Neuropathy    • Nonsustained ventricular tachycardia ([facility]) 7/23/2021   • Obesity    • Paroxysmal atrial fibrillation ([facility]) 12/1/2020   • Sleep apnea    • Stroke ([facility])    • Unsteady gait        Past Surgical History:   Procedure Laterality Date   • ANGIOPLASTY      X2   • APPENDECTOMY     • ARTERIOVENOUS FISTULA/SHUNT SURGERY Left 1/20/2022    Procedure: LEFT BRACHIAL CEPHALIC ARTERIAL VENOUS FISTULA CREATION;  Surgeon: Yony Davila MD;  Location: [facility] MAIN OR;  Service: Vascular;  Laterality: Left;   • CARDIAC CATHETERIZATION  11/13/2015   • CARDIAC CATHETERIZATION N/A 5/24/2021    Procedure: Left Heart Cath and coronary angiogram;  Surgeon: Jose G Rm MD;  Location:  [facility] CATH INVASIVE LOCATION;  Service: Cardiovascular;  Laterality: N/A;   • CARDIAC CATHETERIZATION  5/24/2021    Procedure: Saphenous Vein Graft;  Surgeon: Jose G Rm MD;  Location: [facility] CATH INVASIVE LOCATION;  Service: Cardiovascular;;   • CARDIAC CATHETERIZATION N/A 5/24/2021    Procedure: Percutaneous Coronary Intervention;  Surgeon: Bernabe Zambrano MD;  Location:  [facility] CATH INVASIVE LOCATION;  Service: Cardiology;  Laterality: N/A;   • CARDIAC CATHETERIZATION N/A 5/24/2021    Procedure: Stent NIELS coronary;  Surgeon: Bernabe Zambrano MD;  Location:  [facility] CATH INVASIVE LOCATION;  Service: Cardiology;  Laterality: N/A;   • CARDIAC CATHETERIZATION N/A 5/26/2021    Procedure: Percutaneous Coronary Intervention;  Surgeon: Bernabe Zambrano MD;  Location:  [facility] CATH INVASIVE LOCATION;  Service: Cardiovascular;  Laterality: N/A;   • CARDIAC CATHETERIZATION N/A 5/26/2021    Procedure: Stent NIELS bypass graft;  Surgeon: Bernabe Zambrano MD;  Location:  [facility] CATH INVASIVE LOCATION;  Service: Cardiovascular;  Laterality: N/A;   • CARDIAC  ELECTROPHYSIOLOGY PROCEDURE N/A 5/24/2021    Procedure: Temporary Pacemaker;  Surgeon: Bernabe Zambrano MD;  Location:  [facility] CATH INVASIVE LOCATION;  Service: Cardiology;  Laterality: N/A;   • CARDIAC ELECTROPHYSIOLOGY PROCEDURE N/A 5/26/2021    Procedure: Temporary Pacemaker;  Surgeon: Bernabe Zambrano MD;  Location:  [facility] CATH INVASIVE LOCATION;  Service: Cardiovascular;  Laterality: N/A;   • CARPAL TUNNEL RELEASE Left 04/29/2018    carpal tunnel- lt hand// other hand surgeries    • CATARACT EXTRACTION, BILATERAL  2002    Dr. Lux Acosta   • CHOLECYSTECTOMY     • COLON RESECTION  2005   • CORONARY ANGIOPLASTY     • CORONARY ANGIOPLASTY WITH STENT PLACEMENT  11/13/2015    PTCA stent to proximal in stent and mid to distal lad   • CORONARY ANGIOPLASTY WITH STENT PLACEMENT  09/16/2016    PTCA stent to mid lad and stent to vein graft to marginal   • CORONARY ARTERY BYPASS GRAFT  2005    @ [facility]   • CYST REMOVAL      cyst removed from scrotum   • FOOT SURGERY Right 07/17/2018   • FOOT SURGERY Left 02/04/2019   • PORTACATH PLACEMENT     • TOE AMPUTATION Right     right toe removed D/T infected cut that went to the bone       Family History   Problem Relation Age of Onset   • Heart disease Mother    • Heart disease Father    • Diabetes Sister    • Heart disease Sister    • Diabetes Brother    • Mental illness Brother        Social History     Tobacco Use   • Smoking status: Former Smoker     Packs/day: 1.00     Years: 60.00     Pack years: 60.00     Types: Cigarettes   • Smokeless tobacco: Never Used   • Tobacco comment: Patient quit smoking 11/2020   Vaping Use   • Vaping Use: Never used   Substance Use Topics   • Alcohol use: No   • Drug use: Yes     Frequency: 1.0 times per week     Types: Marijuana     Comment: socially        Medications Prior to Admission   Medication Sig Dispense Refill Last Dose   • albuterol sulfate  (90 Base) MCG/ACT inhaler Inhale 2 puffs Every 4 (Four) Hours.   5/6/2022 at Unknown time   •  apixaban (ELIQUIS) 5 MG tablet tablet Take 1 tablet by mouth Every 12 (Twelve) Hours. Indications: Atrial Fibrillation 60 tablet  5/6/2022 at Unknown time   • atorvastatin (LIPITOR) 40 MG tablet Take 40 mg by mouth Every Night.   5/5/2022 at Unknown time   • budesonide (Pulmicort) 0.5 MG/2ML nebulizer solution Take 2 mL by nebulization 2 (Two) Times a Day.   5/6/2022 at Unknown time   • cholecalciferol (VITAMIN D3) 25 MCG (1000 UT) tablet Take 1,000 Units by mouth Daily.   5/6/2022 at Unknown time   • docusate sodium (COLACE) 100 MG capsule Take 100 mg by mouth Daily.   5/6/2022 at Unknown time   • donepezil (ARICEPT) 10 MG tablet Take 10 mg by mouth Every Night.   5/5/2022 at Unknown time   • fluticasone (FLONASE) 50 MCG/ACT nasal spray 2 sprays by Each Nare route 2 (Two) Times a Day.   5/6/2022 at Unknown time   • guaiFENesin (MUCINEX) 600 MG 12 hr tablet Take 600 mg by mouth 2 (Two) Times a Day.   5/6/2022 at Unknown time   • HYDROcodone-acetaminophen (NORCO) 5-325 MG per tablet Take 1 tablet by mouth 3 (Three) Times a Day.   5/6/2022 at Unknown time   • ipratropium-albuterol (DUO-NEB) 0.5-2.5 mg/3 ml nebulizer Take 3 mL by nebulization 3 (Three) Times a Day. 360 mL  5/6/2022 at Unknown time   • ipratropium-albuterol (DUO-NEB) 0.5-2.5 mg/3 ml nebulizer Take 3 mL by nebulization Every 4 (Four) Hours As Needed for Wheezing. 360 mL  5/6/2022 at Unknown time   • levothyroxine (SYNTHROID, LEVOTHROID) 50 MCG tablet Take 50 mcg by mouth Every Morning.   5/6/2022 at Unknown time   • Metoprolol Tartrate 37.5 MG tablet Take 37.5 mg by mouth 2 (Two) Times a Day. Hold for SBP <110 or HR < 60   5/6/2022 at Unknown time   • midodrine (PROAMATINE) 10 MG tablet Take 10 mg by mouth 3 (Three) Times a Day Before Meals. Hold for BP > 140/90   5/6/2022 at Unknown time   • omeprazole (priLOSEC) 20 MG capsule Take 20 mg by mouth Daily.   5/6/2022 at Unknown time   • polyethylene glycol (MiraLax) 17 g packet Take 17 g by mouth 2 (Two)  Times a Day.   5/6/2022 at Unknown time   • sertraline (ZOLOFT) 50 MG tablet Take 50 mg by mouth Daily.   5/6/2022 at Unknown time   • vitamin B-12 (CYANOCOBALAMIN) 1000 MCG tablet Take 1,000 mcg by mouth Daily.   5/6/2022 at Unknown time   • bisacodyl (DULCOLAX) 10 MG suppository Insert 10 mg into the rectum Daily As Needed for Constipation.   Unknown at Unknown time   • magnesium hydroxide (MILK OF MAGNESIA) 400 MG/5ML suspension Take 30 mL by mouth Daily As Needed for Constipation.   Unknown at Unknown time   • ondansetron (ZOFRAN) 4 MG tablet Take 4 mg by mouth Every 8 (Eight) Hours As Needed for Nausea or Vomiting.   Unknown at Unknown time   • phenylephrine-mineral oil-petrolatum (Preparation H) 0.25-14-74.9 % ointment hemorrhoidal ointment Insert 1 application into the rectum Daily As Needed.   Unknown at Unknown time         Codeine    Scheduled Meds:atorvastatin, 40 mg, Oral, Nightly  bisacodyl, 20 mg, Oral, Once  budesonide, 0.5 mg, Nebulization, BID  cefTRIAXone, 2 g, Intravenous, Q24H  donepezil, 10 mg, Oral, Nightly  insulin lispro, 0-7 Units, Subcutaneous, TID AC  ipratropium-albuterol, 3 mL, Nebulization, TID - RT  levothyroxine, 50 mcg, Oral, Q AM  metoprolol tartrate, 37.5 mg, Oral, BID  midodrine, 10 mg, Oral, TID AC  pantoprazole, 40 mg, Oral, QAM  polyethylene glycol, 17 g, Oral, BID  sertraline, 50 mg, Oral, Daily  sodium chloride, 10 mL, Intravenous, Q12H  sodium-potassium-magnesium sulfates, 1 bottle, Oral, Q12H      Continuous Infusions:   PRN Meds:.•  acetaminophen **OR** acetaminophen **OR** acetaminophen  •  aluminum-magnesium hydroxide-simethicone  •  dextrose  •  dextrose  •  glucagon (human recombinant)  •  insulin lispro **AND** insulin lispro  •  ipratropium-albuterol  •  melatonin  •  ondansetron **OR** ondansetron  •  oxyCODONE  •  sodium chloride  •  sodium chloride    Objective     VITAL SIGNS  Vitals:    05/09/22 0344 05/09/22 0345 05/09/22 1356 05/09/22 1450   BP: 117/71 112/75  "116/64 107/70   BP Location: Right arm Right arm Right arm Right arm   Patient Position: Lying Lying Lying Lying   Pulse: 116 91 (!) 123 120   Resp: 18 18 18 20   Temp: 98.2 °F (36.8 °C) 98 °F (36.7 °C) 96.6 °F (35.9 °C) 97.2 °F (36.2 °C)   TempSrc: Oral Oral Temporal Oral   SpO2: 99% 94%  100%   Weight:       Height:           Flowsheet Rows    Flowsheet Row First Filed Value   Admission Height 177.8 cm (70\") Documented at 05/06/2022 1026   Admission Weight 104 kg (229 lb 0.9 oz) Documented at 05/06/2022 1026            Intake/Output Summary (Last 24 hours) at 5/9/2022 1901  Last data filed at 5/9/2022 1356  Gross per 24 hour   Intake 650 ml   Output 2000 ml   Net -1350 ml        TELEMETRY: Sinus rhythm    Physical Exam:  The patient is alert, oriented and in no distress.  Vital signs as noted above.  Exogenous obesity  Head and neck revealed no carotid bruits or jugular venous distention.  No thyromegaly or lymph adenopathy is present  Lungs clear.  No wheezing.  Breath sounds are normal bilaterally.  Heart normal first and second heart sounds.No murmur.  No precordial rub is present.  No gallop is present.  Abdomen soft and nontender.  No organomegaly is present.  Extremities with good peripheral pulses without any pedal edema.  Skin warm and dry.  Musculoskeletal system is grossly normal  CNS grossly normal      Results Review:   I reviewed the patient's new clinical results.  Lab Results (last 24 hours)     Procedure Component Value Units Date/Time    POC Glucose Once [[id number]]  (Abnormal) Collected: 05/09/22 1622    Specimen: Blood Updated: 05/09/22 1624     Glucose 109 mg/dL      Comment: Serial Number: [id number]:  [id number]       Blood Culture - Blood, Hand, Right [[id number]]  (Normal) Collected: 05/07/22 1519    Specimen: Blood from Hand, Right Updated: 05/09/22 1615     Blood Culture No growth at 2 days    POC Glucose Once [[id number]]  (Normal) Collected: 05/09/22 1447    Specimen: Blood Updated: " 05/09/22 1448     Glucose 101 mg/dL      Comment: Serial Number: [id number]:  [id number]       CANDIDA AURIS SCREEN - Swab, Axilla Right, Axilla Left and Groin [[id number]]  (Normal) Collected: 05/07/22 1020    Specimen: Swab from Axilla Right, Axilla Left and Groin Updated: 05/09/22 1030     Candida Auris Screen Culture No Candida auris isolated at 2 days    Hemoglobin A1c [[id number]]  (Abnormal) Collected: 05/06/22 1045    Specimen: Blood Updated: 05/09/22 1008     Hemoglobin A1C 6.2 %     Narrative:      Hemoglobin A1C Reference Range:    <5.7 %        Normal  5.7-6.4 %     Increased risk for diabetes  > 6.4 %        Diabetes       These guidelines have been recommended by the American Diabetic Association for Hgb A1c.      The following 2010 guidelines have been recommended by the American Diabetes Association for Hemoglobin A1c.    HBA1c 5.7-6.4% Increased risk for future diabetes (pre-diabetes)  HBA1c     >6.4% Diabetes      Hepatitis B Surface Antigen [[id number]]  (Normal) Collected: 05/09/22 0702    Specimen: Blood Updated: 05/09/22 0836     Hepatitis B Surface Ag Non-Reactive    POC Glucose Once [[id number]]  (Abnormal) Collected: 05/09/22 0727    Specimen: Blood Updated: 05/09/22 0729     Glucose 124 mg/dL      Comment: Serial Number: [id number]:  [id number]       Blood Culture - Blood, Arm, Right [[id number]]  (Abnormal) Collected: 05/06/22 1057    Specimen: Blood from Arm, Right Updated: 05/09/22 0704     Blood Culture Streptococcus gallolyticus ssp pasteurianus     Isolated from Aerobic and Anaerobic Bottles     Gram Stain Anaerobic Bottle Gram positive cocci      Aerobic Bottle Gram positive cocci    Narrative:      Refer to previous blood culture collected on 5/6/2022 1116 for MICs.      Blood Culture - Blood, Hand, Right [[id number]]  (Abnormal)  (Susceptibility) Collected: 05/06/22 1116    Specimen: Blood from Hand, Right Updated: 05/09/22 0703     Blood Culture Streptococcus gallolyticus  ssp pasteurianus     Isolated from Aerobic and Anaerobic Bottles     Gram Stain Anaerobic Bottle Gram positive cocci      Aerobic Bottle Gram positive cocci    Susceptibility      Streptococcus gallolyticus ssp pasteurianus      MONROE      Ceftriaxone Susceptible      Penicillin G Susceptible      Vancomycin Susceptible                    Linear View                   CBC & Differential [[id number]]  (Abnormal) Collected: 05/09/22 0528    Specimen: Blood Updated: 05/09/22 0600    Narrative:      The following orders were created for panel order CBC & Differential.  Procedure                               Abnormality         Status                     ---------                               -----------         ------                     CBC Auto Differential[[id number]]        Abnormal            Final result                 Please view results for these tests on the individual orders.    CBC Auto Differential [[id number]]  (Abnormal) Collected: 05/09/22 0528    Specimen: Blood Updated: 05/09/22 0600     WBC 6.90 10*3/mm3      RBC 3.65 10*6/mm3      Hemoglobin 10.8 g/dL      Hematocrit 34.6 %      MCV 94.7 fL      MCH 29.5 pg      MCHC 31.2 g/dL      RDW 16.7 %      RDW-SD 56.4 fl      MPV 7.8 fL      Platelets 200 10*3/mm3      Neutrophil % 70.3 %      Lymphocyte % 19.3 %      Monocyte % 9.1 %      Eosinophil % 0.5 %      Basophil % 0.8 %      Neutrophils, Absolute 4.90 10*3/mm3      Lymphocytes, Absolute 1.30 10*3/mm3      Monocytes, Absolute 0.60 10*3/mm3      Eosinophils, Absolute 0.00 10*3/mm3      Basophils, Absolute 0.10 10*3/mm3      nRBC 0.1 /100 WBC     Basic Metabolic Panel [[id number]]  (Abnormal) Collected: 05/09/22 0228    Specimen: Blood Updated: 05/09/22 0313     Glucose 133 mg/dL      BUN 24 mg/dL      Creatinine 3.39 mg/dL      Sodium 130 mmol/L      Potassium 4.0 mmol/L      Comment: Slight hemolysis detected by analyzer. Results may be affected.        Chloride 93 mmol/L      CO2 24.0 mmol/L       Calcium 7.9 mg/dL      BUN/Creatinine Ratio 7.1     Anion Gap 13.0 mmol/L      eGFR 18.6 mL/min/1.73      Comment: National Kidney Foundation and American Society of Nephrology (ASN) Task Force recommended calculation based on the Chronic Kidney Disease Epidemiology Collaboration (CKD-EPI) equation refit without adjustment for race.       Narrative:      GFR Normal >60  Chronic Kidney Disease <60  Kidney Failure <15      Phosphorus [[id number]]  (Abnormal) Collected: 05/09/22 0228    Specimen: Blood Updated: 05/09/22 0313     Phosphorus 2.2 mg/dL     Magnesium [[id number]]  (Normal) Collected: 05/09/22 0228    Specimen: Blood Updated: 05/09/22 0313     Magnesium 1.9 mg/dL     POC Glucose Once [[id number]]  (Abnormal) Collected: 05/08/22 1906    Specimen: Blood Updated: 05/08/22 1907     Glucose 145 mg/dL      Comment: Serial Number: [id number]:  [id number]             Imaging Results (Last 24 Hours)     ** No results found for the last 24 hours. **      LAB RESULTS (LAST 7 DAYS)    CBC  Results from last 7 days   Lab Units 05/09/22 0528 05/08/22 0446 05/07/22 0417 05/06/22  1045   WBC 10*3/mm3 6.90 6.10 6.70 5.20   RBC 10*6/mm3 3.65* 3.68* 3.69* 3.78*   HEMOGLOBIN g/dL 10.8* 10.9* 10.9* 11.0*   HEMATOCRIT % 34.6* 35.8* 35.7* 35.8*   MCV fL 94.7 97.2* 96.6 94.5   PLATELETS 10*3/mm3 200 173 163 203       BMP  Results from last 7 days   Lab Units 05/09/22 0228 05/08/22 0446 05/07/22 0417 05/06/22  1045   SODIUM mmol/L 130* 133* 134* 138   POTASSIUM mmol/L 4.0 4.0 3.9 3.8   CHLORIDE mmol/L 93* 97* 97* 95*   CO2 mmol/L 24.0 25.0 26.0 30.0*   BUN mg/dL 24* 19 12 15   CREATININE mg/dL 3.39* 2.94* 2.24* 2.74*   GLUCOSE mg/dL 133* 116* 164* 99   MAGNESIUM mg/dL 1.9 2.0  --   --    PHOSPHORUS mg/dL 2.2* 2.2*  --   --        CMP   Results from last 7 days   Lab Units 05/09/22 0228 05/08/22 0446 05/07/22  0417 05/06/22  1045   SODIUM mmol/L 130* 133* 134* 138   POTASSIUM mmol/L 4.0 4.0 3.9 3.8   CHLORIDE mmol/L 93*  97* 97* 95*   CO2 mmol/L 24.0 25.0 26.0 30.0*   BUN mg/dL 24* 19 12 15   CREATININE mg/dL 3.39* 2.94* 2.24* 2.74*   GLUCOSE mg/dL 133* 116* 164* 99   ALBUMIN g/dL  --  2.60*  --  3.10*   BILIRUBIN mg/dL  --  0.4  --  0.7   ALK PHOS U/L  --  52  --  59   AST (SGOT) U/L  --  12  --  11   ALT (SGPT) U/L  --  5  --  5         BNP        TROPONIN  Results from last 7 days   Lab Units 05/06/22  1828   TROPONIN T ng/mL 0.311*       CoAg        Creatinine Clearance  Estimated Creatinine Clearance: 24.1 mL/min (A) (by C-G formula based on SCr of 3.39 mg/dL (H)).    ABG        Radiology  No radiology results for the last day      EKG            I personally viewed and interpreted the patient's EKG/Telemetry data:    ECHOCARDIOGRAM:    Results for orders placed during the hospital encounter of 05/06/22    Adult Transthoracic Echo Complete w/ Color, Spectral and Contrast if Necessary Per Protocol    Interpretation Summary  Severe global left ventricular hypokinesis, estimated LV ejection fraction of 20%.  Mild right ventricular enlargement  Moderate left atrial enlargement  Aortic valve not well visualized in short axis.  Dopplers do not reveal any abnormality but  Mitral valve, tricuspid valve appears structurally normal, while mitral and tricuspid regurgitation seen.  Calculated RV systolic pressure of 30 mmHg  No pericardial effusion seen.  Proximal aorta appears normal in size.              Cardiolite (Tc-99m Sestamibi) stress test      OTHER:     Assessment/Plan     Principal Problem:    Fluid overload  Active Problems:    S/P CABG (coronary artery bypass graft)    Primary hypertension    Elevated troponin    ANNETTE treated with BiPAP    Major depressive disorder, recurrent, mild (HCC)    Mixed hyperlipidemia    Flaccid hemiplegia of right dominant side as late effect of cerebral infarction (HCC)    Diabetic neuropathy (HCC)    COPD with acute exacerbation (HCC)    Anemia of renal disease    End stage renal disease (HCC)     Chronic respiratory failure with hypoxia, on home oxygen therapy ([facility])    Hypothyroidism (acquired)    Chronic diastolic CHF (congestive heart failure) ([facility])    Stage 5 chronic kidney disease on chronic dialysis (HCC)    Bacteremia    Normocytic anemia due to blood loss      [[[[[[[[[[[[[[[[[[[[[[[[[    Impression  ==============    -Shortness of breath    - Positive blood cultures for Streptococcus 2 out of 2 sets.  Rule out tunneled catheter infection.    -Acute on chronic fluid overload.      -status post CABG 2005. status post stent to LAD 2009    Status post stent to LAD 11/13/2015  Status post stent to mid LAD and SVG to marginal branch 09/16/2016.  Status post stent to mid LAD 5/24/2021  Status post stent to SVG to RCA 5/26/2021     Cardiac catheterization 5/24/2021 revealed  Left ventricle angiogram was not performed due to pre-existing renal dysfunction.  Left main coronary artery normal.  Left anterior descending artery has mid segment lengthy 90% disease within the previously placed stent.  Distal left into descending artery has diffuse disease.  Circumflex coronary artery is totally occluded.  (Chronic)  Right coronary artery is chronically occluded.  SVG to marginal branch (jump graft to OM1 and OM 2) is totally occluded (new finding compared to 2015.  SVG to PDA has distal radiolucency.  However no definite obstructive disease is present.       -status post myocardial infarction 2000 and 2002 .      -history of intermittent but mostly persistent atrial fibrillation     -Acute on chronic congestive heart failure.  Compensated at this time.     Recent echocardiogram showed left ventricle dysfunction with ejection fraction of 35%.     -History of right-sided weakness with left MCA territory stroke.-Improved     Transesophageal echocardiogram 11/30/2020.  Biatrial enlargement.  Smoking effect in the left atrium and left ventricle and left atrial appendage.  Mild mitral and aortic regurgitation.  Left  ventricle enlargement with diffuse hypocontractility with ejection fraction of 35 to 40%.     Echocardiogram 11/27/2020 revealed left atrial enlargement left ventricle dysfunction with ejection fraction of 40%.  Negative bubble study.      -diabetes hypertension and sleep apnea.  ESRD.     -status post colon surgery (partial colectomy) appendectomy cholecystectomy right 4th toe removal and carpal tunnel surgery      -continued smoking 1 pack per day -abstinence from smoking      -allergy to codeine.  ============   Plan  ================  Shortness of breath  Positive blood cultures for Streptococcus 2 out of 2 sets.  Rule out tunneled catheter infection..  Review echocardiogram     Status post CABG  Patient is not having any angina pectoris or congestive heart failure     Atrial fibrillation-chronic  Rate is better controlled  Patient is on Coreg at 12.5 mg p.o. twice a day amiodarone and Cardizem.      ESRD  Patient is undergoing dialysis.      Status post stent to LAD 5/24/2021.  Status post stent to SVG to RCA 5/26/2021.       Anticoagulation  Patient was on Eliquis 5 mg twice a day.  Observe for toxic effects.  Eliquis is on hold     Echocardiogram 3/12/2022 revealed ejection fraction of 25 to 30%.     Consider biventricular ICD if left ventricular function does not improve or congestive heart failure gets worse.     Further plan will depend on patient's progress.   ]]]]]]]]]]]]]]]]]]]]]]           Jose G Rm MD  05/09/22  19:01 EDT

## 2022-05-09 NOTE — PROGRESS NOTES
Name: Benson Mclean  Age: 71 y.o.  : 1950  Sex: male    22    Subjective  Patient feels well, no complaints.       Objective:    Vital Signs  Temp:  [97.5 °F (36.4 °C)-98.2 °F (36.8 °C)] 98 °F (36.7 °C)  Heart Rate:  [] 91  Resp:  [18] 18  BP: (108-117)/(64-75) 112/75        Intake/Output Summary (Last 24 hours) at 2022 0905  Last data filed at 2022 0800  Gross per 24 hour   Intake 1130 ml   Output --   Net 1130 ml           Physical Exam  Physical Exam  Constitutional:       General: He is not in acute distress.     Appearance: He is not diaphoretic.   HENT:      Head: Normocephalic and atraumatic.      Nose: Nose normal.   Eyes:      Pupils: Pupils are equal, round, and reactive to light.   Neck:      Thyroid: No thyromegaly.      Vascular: No JVD.      Trachea: No tracheal deviation.   Cardiovascular:      Rate and Rhythm: Normal rate and regular rhythm.      Heart sounds: No murmur heard.    No friction rub. No gallop.   Pulmonary:      Effort: Pulmonary effort is normal. No respiratory distress.      Breath sounds: No stridor. No wheezing or rales.   Chest:      Chest wall: No tenderness.   Abdominal:      General: Bowel sounds are normal.      Palpations: Abdomen is soft. There is no mass.      Tenderness: There is no abdominal tenderness. There is no guarding or rebound.      Hernia: No hernia is present.   Musculoskeletal:         General: No tenderness or deformity. Normal range of motion.      Cervical back: Normal range of motion and neck supple.   Skin:     General: Skin is warm and dry.      Coloration: Skin is not pale.      Findings: No rash.   Neurological:      Mental Status: He is alert and oriented to person, place, and time.      Cranial Nerves: No cranial nerve deficit.      Motor: No abnormal muscle tone.      Coordination: Coordination normal.      Deep Tendon Reflexes: Reflexes are normal and symmetric. Reflexes normal.   Psychiatric:         Behavior: Behavior  normal.         Thought Content: Thought content normal.         Judgment: Judgment normal.            Results Review:      Results from last 7 days   Lab Units 05/09/22 0228 05/08/22 0446 05/07/22 0417 05/06/22  1045   SODIUM mmol/L 130* 133* 134* 138   CO2 mmol/L 24.0 25.0 26.0 30.0*   BUN mg/dL 24* 19 12 15   CREATININE mg/dL 3.39* 2.94* 2.24* 2.74*   CALCIUM mg/dL 7.9* 8.1* 8.1* 8.1*   ALBUMIN g/dL  --  2.60*  --  3.10*   AST (SGOT) U/L  --  12  --  11   ALT (SGPT) U/L  --  5  --  5   EGFR mL/min/1.73 18.6* 22.1* 30.6* 24.0*       Results from last 7 days   Lab Units 05/09/22 0528 05/08/22 0446 05/07/22 0417 05/06/22  1045   WBC 10*3/mm3 6.90 6.10 6.70 5.20         Medication Review:   cefTRIAXone, 2 g, Intravenous, Q24H  insulin lispro, 0-7 Units, Subcutaneous, TID AC  sodium chloride, 10 mL, Intravenous, Q12H          Assessment  End-stage renal disease    Shortness of breath, patient has a history of chronic respiratory failure on home oxygen.  Mild volume overload on chest x-ray    Positive blood cultures  Patient growing strep    History of CVA    History of coronary artery disease    Diabetes      Plan:  HD MWF  BP stable, on Midodrine  ABX per ID  May need TDC exchanged  Will follow      Yony Bhat MD  05/09/22  09:05 EDT  Tel: [phone]  Fax: [phone]

## 2022-05-09 NOTE — PROGRESS NOTES
Nutrition Services    Patient Name: Benson Mclean  YOB: 1950  MRN: [id number]  Admission date: 5/6/2022    Comment:  NPO ([facility] diet prior)  Add Boost GC BID once diet advanced    PPE Documentation        PPE Worn By Provider N/A, unable to assess in person this date   PPE Worn By Patient  N/A     CLINICAL NUTRITION ASSESSMENT      Reason for Assessment 5/9: Wound assessment     H&P      Past Medical History:   Diagnosis Date   • A-fib ([facility]) 8/3/2021   • Anemia    • Appetite loss    • Arthritis    • Brain bleed ([facility])    • Carpal tunnel syndrome on left    • CHF (congestive heart failure) ([facility])    • Chronic left-sided low back pain without sciatica 12/27/2017   • CKD (chronic kidney disease) stage 3, GFR 30-59 ml/min ([facility])    • Closed fracture of seventh thoracic vertebra ([facility]) 8/23/2020   • Cognitive communication deficit 12/1/2020   • COPD (chronic obstructive pulmonary disease) ([facility])    • Coronary artery disease    • COVID-19 2/19/2021   • Cytokine release syndrome, grade 1 5/24/2021   • Depression    • Diabetic neuropathy ([facility])    • Dialysis patient ([facility])     mon wed fri   • DM2 (diabetes mellitus, type 2) ([facility])    • GERD (gastroesophageal reflux disease)    • Hyperlipidemia    • Hypertension    • Hypogonadism in male    • Neuropathy    • Nonsustained ventricular tachycardia ([facility]) 7/23/2021   • Obesity    • Paroxysmal atrial fibrillation ([facility]) 12/1/2020   • Sleep apnea    • Stroke ([facility])    • Unsteady gait        Past Surgical History:   Procedure Laterality Date   • ANGIOPLASTY      X2   • APPENDECTOMY     • ARTERIOVENOUS FISTULA/SHUNT SURGERY Left 1/20/2022    Procedure: LEFT BRACHIAL CEPHALIC ARTERIAL VENOUS FISTULA CREATION;  Surgeon: Yony Davila MD;  Location: [facility] ER;  Service: Vascular;  Laterality: Left;   • CARDIAC CATHETERIZATION  11/13/2015   • CARDIAC CATHETERIZATION N/A 5/24/2021    Procedure: Left Heart Cath and coronary angiogram;  Surgeon: Jose G Rm MD;   Location:  [facility] CATH INVASIVE LOCATION;  Service: Cardiovascular;  Laterality: N/A;   • CARDIAC CATHETERIZATION  5/24/2021    Procedure: Saphenous Vein Graft;  Surgeon: Jose G Rm MD;  Location:  [facility] C[facility] INVASIVE [facility];  Service: Cardiovascular;;   • CARDIAC CATHETERIZATION N/A 5/24/2021    Procedure: Percutaneous Coronary Intervention;  Surgeon: Benrabe Zambrano MD;  Location:  [facility] CATH INVASIVE [facility];  Service: Cardiology;  Laterality: N/A;   • CARDIAC CATHETERIZATION N/A 5/24/2021    Procedure: Stent NIELS coronary;  Surgeon: Bernabe Zambrano MD;  Location:  [facility] C[facility] INVASIVE [facility];  Service: Cardiology;  Laterality: N/A;   • CARDIAC CATHETERIZATION N/A 5/26/2021    Procedure: Percutaneous Coronary Intervention;  Surgeon: Bernabe Zambrano MD;  Location:  [facility] C[facility] INVASIVE [facility];  Service: Cardiovascular;  Laterality: N/A;   • CARDIAC CATHETERIZATION N/A 5/26/2021    Procedure: Stent NIELS bypass graft;  Surgeon: Bernabe Zambrano MD;  Location: [facility] CATH INVASIVE LOCATION;  Service: Cardiovascular;  Laterality: N/A;   • CARDIAC ELECTROPHYSIOLOGY PROCEDURE N/A 5/24/2021    Procedure: Temporary Pacemaker;  Surgeon: Bernabe Zambrano MD;  Location: [facility] CATH INVASIVE LOCATION;  Service: Cardiology;  Laterality: N/A;   • CARDIAC ELECTROPHYSIOLOGY PROCEDURE N/A 5/26/2021    Procedure: Temporary Pacemaker;  Surgeon: Bernabe Zambrano MD;  Location: [facility] CATH INVASIVE LOCATION;  Service: Cardiovascular;  Laterality: N/A;   • CARPAL TUNNEL RELEASE Left 04/29/2018    carpal tunnel- lt hand// other hand surgeries    • CATARACT EXTRACTION, BILATERAL  2002    Dr. Lux Acosta   • CHOLECYSTECTOMY     • COLON RESECTION  2005   • CORONARY ANGIOPLASTY     • CORONARY ANGIOPLASTY WITH STENT PLACEMENT  11/13/2015    PTCA stent to proximal in stent and mid to distal lad   • CORONARY ANGIOPLASTY WITH STENT PLACEMENT  09/16/2016    PTCA stent to mid lad and stent to vein graft to marginal   • CORONARY ARTERY BYPASS GRAFT   "2005    @ [facility]   • CYST REMOVAL      cyst removed from scrotum   • FOOT SURGERY Right 07/17/2018   • FOOT SURGERY Left 02/04/2019   • PORTACATH PLACEMENT     • TOE AMPUTATION Right     right toe removed D/T infected cut that went to the bone        Current Problems   Shortness of breath, 2/2 fluid overload    Chronic respiratory failure    ESRD on HD    CVA    CAD, s/p CABG    HLD    Afib    Hypothyroidism    ANNETTE    Dysphagia  -ST following       Encounter Information        Trending Narrative     5/9: Patient currently in HD at time of assessment. Chart reviewed, noted ST assessment clearing patient for Regular diet.     Anthropometrics        Current Height, Weight Height: 177.8 cm (70\")  Weight: 104 kg (229 lb) (05/08/22 1053)       Ideal Body Weight (IBW) 166lb   Usual Body Weight (UBW) OZIEL       Trending Weight Hx     This admission: 5/9: stable since admit             PTA: 5/9: current weight down 6% x1 month, though pt admitted with fluid overload    Wt Readings from Last 30 Encounters:   05/08/22 1053 104 kg (229 lb)   05/06/22 1026 104 kg (229 lb 0.9 oz)   04/12/22 0506 110 kg (242 lb 8.1 oz)   04/09/22 0506 110 kg (243 lb 9.7 oz)   04/08/22 0500 111 kg (244 lb 0.8 oz)   04/08/22 0113 111 kg (244 lb 0.8 oz)   04/07/22 0536 112 kg (247 lb 12.8 oz)   04/06/22 0500 112 kg (246 lb 0.5 oz)   04/05/22 0543 113 kg (248 lb 7.3 oz)   04/04/22 0500 113 kg (248 lb 7.3 oz)   04/03/22 0454 108 kg (238 lb 8.6 oz)   03/31/22 0522 108 kg (238 lb 8.6 oz)   03/30/22 0500 117 kg (257 lb 4.4 oz)   03/30/22 0147 117 kg (257 lb 4.4 oz)   03/29/22 2047 117 kg (257 lb)   03/18/22 0540 117 kg (257 lb 3.2 oz)   03/16/22 0529 116 kg (254 lb 13.6 oz)   03/16/22 0100 116 kg (254 lb 13.6 oz)   03/15/22 0600 115 kg (253 lb 8.5 oz)   03/14/22 0511 120 kg (265 lb 6.9 oz)   03/13/22 0757 115 kg (254 lb)   03/13/22 0321 116 kg (254 lb 13.6 oz)   03/12/22 1910 113 kg (250 lb)   03/09/22 0336 113 kg (249 lb 1.9 oz)   03/08/22 0416 113 kg (249 " lb 5.4 oz)   03/07/22 1044 114 kg (251 lb)   02/24/22 0733 95.7 kg (211 lb)   01/20/22 0826 96.1 kg (211 lb 14.9 oz)   09/23/21 1006 106 kg (234 lb)   08/30/21 1647 106 kg (234 lb 9.1 oz)   08/30/21 1147 106 kg (234 lb 5.6 oz)   08/25/21 0532 103 kg (227 lb 1.2 oz)   08/24/21 0641 98.6 kg (217 lb 6 oz)   08/23/21 0649 98.2 kg (216 lb 7.9 oz)   08/21/21 2338 104 kg (229 lb 2.3 oz)   08/21/21 0600 105 kg (231 lb 14.4 oz)   08/20/21 0612 106 kg (234 lb 2.1 oz)   08/18/21 0640 108 kg (238 lb 12.1 oz)   08/16/21 0441 108 kg (238 lb 12.1 oz)   08/14/21 0820 113 kg (248 lb 3.8 oz)   08/12/21 0555 115 kg (254 lb 10.1 oz)   08/11/21 0559 115 kg (253 lb 8.5 oz)   08/10/21 0618 115 kg (253 lb 15.5 oz)   08/09/21 0609 113 kg (249 lb 1.9 oz)   08/08/21 0626 115 kg (253 lb 1.4 oz)   08/07/21 0612 116 kg (256 lb 9.9 oz)   08/05/21 0551 115 kg (253 lb 4.9 oz)   08/04/21 0115 116 kg (256 lb 2.8 oz)   08/03/21 1733 113 kg (250 lb)   08/01/21 0546 125 kg (276 lb 0.3 oz)   07/31/21 0519 124 kg (274 lb 7.6 oz)   07/29/21 0500 118 kg (261 lb 3.9 oz)   07/28/21 0514 117 kg (258 lb 6.1 oz)   07/27/21 0533 117 kg (257 lb 0.9 oz)   07/26/21 1620 115 kg (253 lb 8.5 oz)   07/24/21 0422 115 kg (252 lb 8 oz)   07/23/21 0342 116 kg (254 lb 13.6 oz)   07/22/21 0328 116 kg (256 lb 9.9 oz)   07/21/21 1405 114 kg (252 lb)   07/21/21 0145 114 kg (252 lb 3.3 oz)   07/20/21 2059 114 kg (251 lb)   07/02/21 0300 114 kg (250 lb 10.6 oz)   07/01/21 0344 113 kg (249 lb 9 oz)   06/30/21 0351 113 kg (248 lb 14.4 oz)   06/29/21 0600 113 kg (248 lb 10.9 oz)   06/28/21 0340 114 kg (251 lb 5.2 oz)   06/27/21 0438 111 kg (245 lb 2.4 oz)   06/26/21 0442 116 kg (256 lb 9.9 oz)   06/26/21 0050 116 kg (255 lb 11.7 oz)   06/25/21 1926 113 kg (250 lb)   06/03/21 0500 119 kg (262 lb 12.6 oz)   06/02/21 0506 118 kg (260 lb 2.3 oz)   06/01/21 0528 121 kg (267 lb 13.7 oz)   05/31/21 0500 122 kg (269 lb 6.4 oz)   05/30/21 0500 121 kg (266 lb 8.6 oz)   05/29/21 0500 122 kg  (268 lb 15.4 oz)   05/29/21 0305 122 kg (268 lb 15.4 oz)   05/28/21 0504 122 kg (268 lb 8.3 oz)   05/26/21 0449 118 kg (260 lb 12.9 oz)   05/24/21 0323 119 kg (262 lb 9.1 oz)   05/22/21 2100 125 kg (276 lb 3.2 oz)   05/21/21 0349 125 kg (276 lb 7.3 oz)   05/20/21 0439 127 kg (280 lb 6.8 oz)   05/19/21 0202 127 kg (281 lb 1.4 oz)   05/18/21 2219 122 kg (270 lb)   05/08/21 0511 116 kg (256 lb 13.4 oz)   05/07/21 0606 118 kg (261 lb)   05/07/21 0456 118 kg (261 lb 0.4 oz)   05/06/21 2304 118 kg (261 lb 0.4 oz)   05/06/21 1720 122 kg (270 lb)   03/16/21 1141 125 kg (276 lb)   11/30/20 0557 125 kg (276 lb 7.3 oz)   11/29/20 0615 126 kg (278 lb 7.1 oz)   11/28/20 0635 124 kg (273 lb 5.9 oz)   11/27/20 1611 120 kg (265 lb)   11/27/20 0801 120 kg (265 lb)   11/16/20 0918 120 kg (265 lb)   09/23/20 1047 128 kg (282 lb)   08/24/20 0617 128 kg (282 lb 3 oz)   08/23/20 1212 126 kg (276 lb 14.4 oz)   08/23/20 0932 120 kg (265 lb)   01/23/20 1100 129 kg (284 lb 8 oz)   08/07/19 1413 129 kg (284 lb)   07/26/19 1957 126 kg (278 lb 10.6 oz)   06/26/19 0832 130 kg (285 lb 9.6 oz)   06/22/19 2034 130 kg (287 lb)   06/05/19 1011 129 kg (285 lb)   05/15/19 0850 129 kg (285 lb)   04/24/19 0837 128 kg (283 lb)   04/03/19 0824 127 kg (280 lb)   03/20/19 1106 127 kg (279 lb)   03/06/19 1100 126 kg (278 lb)      BMI kg/m2 Body mass index is 32.86 kg/m².       Labs        Pertinent Labs    Results from last 7 days   Lab Units 05/09/22  0228 05/08/22  0446 05/07/22  0417 05/06/22  1045   SODIUM mmol/L 130* 133* 134* 138   POTASSIUM mmol/L 4.0 4.0 3.9 3.8   CHLORIDE mmol/L 93* 97* 97* 95*   CO2 mmol/L 24.0 25.0 26.0 30.0*   BUN mg/dL 24* 19 12 15   CREATININE mg/dL 3.39* 2.94* 2.24* 2.74*   CALCIUM mg/dL 7.9* 8.1* 8.1* 8.1*   BILIRUBIN mg/dL  --  0.4  --  0.7   ALK PHOS U/L  --  52  --  59   ALT (SGPT) U/L  --  5  --  5   AST (SGOT) U/L  --  12  --  11   GLUCOSE mg/dL 133* 116* 164* 99     Results from last 7 days   Lab Units 05/09/22  0595  "05/09/22  0228 05/08/22  0446   MAGNESIUM mg/dL  --  1.9 2.0   PHOSPHORUS mg/dL  --  2.2* 2.2*   HEMOGLOBIN g/dL 10.8*  --  10.9*   HEMATOCRIT % 34.6*  --  35.8*     COVID19   Date Value Ref Range Status   05/06/2022 Not Detected Not Detected - Ref. Range Final     Lab Results   Component Value Date    HGBA1C 6.2 (H) 05/06/2022        Medications    Scheduled Medications cefTRIAXone, 2 g, Intravenous, Q24H  insulin lispro, 0-7 Units, Subcutaneous, TID AC  sodium chloride, 10 mL, Intravenous, Q12H        Infusions      PRN Medications •  acetaminophen **OR** acetaminophen **OR** acetaminophen  •  aluminum-magnesium hydroxide-simethicone  •  dextrose  •  dextrose  •  glucagon (human recombinant)  •  insulin lispro **AND** insulin lispro  •  melatonin  •  ondansetron **OR** ondansetron  •  sodium chloride  •  sodium chloride     Physical Findings        Trending Physical   Appearance, NFPE 5/9: Pt in dialysis, will visit in person when able   --  Edema  None documented     Bowel Function BM 5/8   Tubes none   Chewing/Swallowing No current issues per ST eval   Skin DTPI L heel       Estimated/Assessed Needs       Energy Requirements    Height for Calculation  Height: 177.8 cm (70\")   Weight for Calculation    Method for Estimation     EST Needs (kcal/day)        Protein Requirements    Weight for Calculation 75kg, IBW   EST Protein Needs (g/kg) 1.3-1.5g/kg   EST Daily Needs (g/day) 98-113g/day       Fluid Requirements     Estimated Needs (mL/day) 1mL/kcal       Fluid Deficit    Current Na Level (mEq/L)    Desired Na Level (mEq/L)    Estimated Fluid Deficit (L)       Current Nutrition Orders & Evaluation of Intake       Oral Nutrition     Food Allergies NKFA   Current PO Diet Diet Diabetic/Consistent Carbs; Diabetic - Consistent Carb   Supplement    PO Evaluation     Trending % PO Intake 5/9: 56% x4 meals     Enteral Nutrition    Enteral Route    TF Modular    TF Delivery Method    Current TF Order    Current Water Flush "     TF Residual/Tolerance     TF Observation         Parenteral Nutrition     TPN Route    Current TPN Order    Lipids (mL/%/frequency)     MVI Frequency     Trace Element Frequency     Total # Days on TPN    TPN Observation         Nutrition Course Details    PO Diets: 5/6 [employer]  5/8 CCHO  5/9 Pt currently NPO in anticipation for procedure   Nutrition Support:      Nutritional Risk Screening        NRS-2002 Score          Nutrition Diagnosis         Nutrition Dx Problem 1 Increased nutrient needs (protein) related to increased demand for healing and repletion as evidenced by wound and HD.      Nutrition Dx Problem 2        Intervention Goal         Intervention Goal(s) Meal intake at least 75% of meals  Acceptance of ONS     Nutrition Intervention        RD Action Add Boost GC BID once diet advanced     Nutrition Prescription          Diet Prescription NPO   Supplement Prescription      Enteral Prescription        TPN Prescription      Monitor/Evaluation        Monitor PO intake, Supplement intake, Pertinent labs, Weight, Skin status, GI status         Electronically signed by:  Tonia Wheeler RD  05/09/22 10:58 EDT

## 2022-05-09 NOTE — CONSULTS
GI CONSULT  NOTE:    Referring Provider:  Dr. Jack    Chief complaint: Strep bovis bacteremia    Subjective .     History of present illness: Benson Mclean is a 71 y.o. male with history of ESRD on HD, DMII, CHF, CAD s/p stent placement, CABG, A. fib, CVA with right-sided weakness, COPD, ANNETTE, hypertension, hyperlipidemia, and dementia who presented on 5/6 with complaints of shortness of breath.  The patient was reportedly receiving dialysis when he became short of breath and was brought to the emergency room at [facility] for further evaluation and treatment.  He was diagnosed with chronic respiratory failure secondary to fluid overload.  Since admission, he has also been diagnosed with bacteremia.  Cultures have been positive for strep bovis.  GI has been asked to consult to evaluate for possible colonic malignancy.  The patient reports that he moves his bowels regularly and denies any bright red blood per rectum or melena.  No abdominal pain.  He states that he has some mild nausea after oral intake, but denies vomiting.  States that he has had nausea for years.  He is not having any heartburn or dysphagia.  Admits to Advil use as needed, but denies daily NSAID use.  Denies recent fever or unintentional weight loss.      Endo History:  10/2006 colonoscopy (Dr. Rodríguez) -diverticulosis    Past Medical History:  Past Medical History:   Diagnosis Date   • A-fib ([facility]) 8/3/2021   • Anemia    • Appetite loss    • Arthritis    • Brain bleed ([facility])    • Carpal tunnel syndrome on left    • CHF (congestive heart failure) ([facility])    • Chronic left-sided low back pain without sciatica 12/27/2017   • CKD (chronic kidney disease) stage 3, GFR 30-59 ml/min ([facility])    • Closed fracture of seventh thoracic vertebra ([facility]) 8/23/2020   • Cognitive communication deficit 12/1/2020   • COPD (chronic obstructive pulmonary disease) ([facility])    • Coronary artery disease    • COVID-19 2/19/2021   • Cytokine release syndrome, grade 1  5/24/2021   • Depression    • Diabetic neuropathy (HCC)    • Dialysis patient (HCC)     mon wed fri   • DM2 (diabetes mellitus, type 2) (HCC)    • GERD (gastroesophageal reflux disease)    • Hyperlipidemia    • Hypertension    • Hypogonadism in male    • Neuropathy    • Nonsustained ventricular tachycardia (HCC) 7/23/2021   • Obesity    • Paroxysmal atrial fibrillation (HCC) 12/1/2020   • Sleep apnea    • Stroke (HCC)    • Unsteady gait        Past Surgical History:  Past Surgical History:   Procedure Laterality Date   • ANGIOPLASTY      X2   • APPENDECTOMY     • ARTERIOVENOUS FISTULA/SHUNT SURGERY Left 1/20/2022    Procedure: LEFT BRACHIAL CEPHALIC ARTERIAL VENOUS FISTULA CREATION;  Surgeon: Yony Davila MD;  Location: [facility] MAIN OR;  Service: Vascular;  Laterality: Left;   • CARDIAC CATHETERIZATION  11/13/2015   • CARDIAC CATHETERIZATION N/A 5/24/2021    Procedure: Left Heart Cath and coronary angiogram;  Surgeon: Jose G Rm MD;  Location: [facility] CATH INVASIVE LOCATION;  Service: Cardiovascular;  Laterality: N/A;   • CARDIAC CATHETERIZATION  5/24/2021    Procedure: Saphenous Vein Graft;  Surgeon: Jose G Rm MD;  Location:  [facility] CATH INVASIVE LOCATION;  Service: Cardiovascular;;   • CARDIAC CATHETERIZATION N/A 5/24/2021    Procedure: Percutaneous Coronary Intervention;  Surgeon: Bernabe Zambrano MD;  Location:  [facility] CATH INVASIVE LOCATION;  Service: Cardiology;  Laterality: N/A;   • CARDIAC CATHETERIZATION N/A 5/24/2021    Procedure: Stent NIELS coronary;  Surgeon: Bernabe Zambrano MD;  Location:  [facility] CATH INVASIVE LOCATION;  Service: Cardiology;  Laterality: N/A;   • CARDIAC CATHETERIZATION N/A 5/26/2021    Procedure: Percutaneous Coronary Intervention;  Surgeon: Bernabe Zambrano MD;  Location:  [facility] CATH INVASIVE LOCATION;  Service: Cardiovascular;  Laterality: N/A;   • CARDIAC CATHETERIZATION N/A 5/26/2021    Procedure: Stent NIELS bypass graft;  Surgeon: Bernabe Zambrano MD;  Location:  [facility]  CATH INVASIVE LOCATION;  Service: Cardiovascular;  Laterality: N/A;   • CARDIAC ELECTROPHYSIOLOGY PROCEDURE N/A 5/24/2021    Procedure: Temporary Pacemaker;  Surgeon: Bernabe Zambrano MD;  Location: [facility] CATH INVASIVE LOCATION;  Service: Cardiology;  Laterality: N/A;   • CARDIAC ELECTROPHYSIOLOGY PROCEDURE N/A 5/26/2021    Procedure: Temporary Pacemaker;  Surgeon: Bernabe Zambrano MD;  Location: [facility] CATH INVASIVE LOCATION;  Service: Cardiovascular;  Laterality: N/A;   • CARPAL TUNNEL RELEASE Left 04/29/2018    carpal tunnel- lt hand// other hand surgeries    • CATARACT EXTRACTION, BILATERAL  2002    Dr. Lux Acosta   • CHOLECYSTECTOMY     • COLON RESECTION  2005   • CORONARY ANGIOPLASTY     • CORONARY ANGIOPLASTY WITH STENT PLACEMENT  11/13/2015    PTCA stent to proximal in stent and mid to distal lad   • CORONARY ANGIOPLASTY WITH STENT PLACEMENT  09/16/2016    PTCA stent to mid lad and stent to vein graft to marginal   • CORONARY ARTERY BYPASS GRAFT  2005    @ [facility]   • CYST REMOVAL      cyst removed from scrotum   • FOOT SURGERY Right 07/17/2018   • FOOT SURGERY Left 02/04/2019   • PORTACATH PLACEMENT     • TOE AMPUTATION Right     right toe removed D/T infected cut that went to the bone       Social History:  Social History     Tobacco Use   • Smoking status: Former Smoker     Packs/day: 1.00     Years: 60.00     Pack years: 60.00     Types: Cigarettes   • Smokeless tobacco: Never Used   • Tobacco comment: Patient quit smoking 11/2020   Vaping Use   • Vaping Use: Never used   Substance Use Topics   • Alcohol use: No   • Drug use: Yes     Frequency: 1.0 times per week     Types: Marijuana     Comment: socially       Family History:  Family History   Problem Relation Age of Onset   • Heart disease Mother    • Heart disease Father    • Diabetes Sister    • Heart disease Sister    • Diabetes Brother    • Mental illness Brother        Medications:  Medications Prior to Admission   Medication Sig Dispense Refill Last Dose    • albuterol sulfate  (90 Base) MCG/ACT inhaler Inhale 2 puffs Every 4 (Four) Hours.   5/6/2022 at Unknown time   • apixaban (ELIQUIS) 5 MG tablet tablet Take 1 tablet by mouth Every 12 (Twelve) Hours. Indications: Atrial Fibrillation 60 tablet  5/6/2022 at Unknown time   • atorvastatin (LIPITOR) 40 MG tablet Take 40 mg by mouth Every Night.   5/5/2022 at Unknown time   • budesonide (Pulmicort) 0.5 MG/2ML nebulizer solution Take 2 mL by nebulization 2 (Two) Times a Day.   5/6/2022 at Unknown time   • cholecalciferol (VITAMIN D3) 25 MCG (1000 UT) tablet Take 1,000 Units by mouth Daily.   5/6/2022 at Unknown time   • docusate sodium (COLACE) 100 MG capsule Take 100 mg by mouth Daily.   5/6/2022 at Unknown time   • donepezil (ARICEPT) 10 MG tablet Take 10 mg by mouth Every Night.   5/5/2022 at Unknown time   • fluticasone (FLONASE) 50 MCG/ACT nasal spray 2 sprays by Each Nare route 2 (Two) Times a Day.   5/6/2022 at Unknown time   • guaiFENesin (MUCINEX) 600 MG 12 hr tablet Take 600 mg by mouth 2 (Two) Times a Day.   5/6/2022 at Unknown time   • HYDROcodone-acetaminophen (NORCO) 5-325 MG per tablet Take 1 tablet by mouth 3 (Three) Times a Day.   5/6/2022 at Unknown time   • ipratropium-albuterol (DUO-NEB) 0.5-2.5 mg/3 ml nebulizer Take 3 mL by nebulization 3 (Three) Times a Day. 360 mL  5/6/2022 at Unknown time   • ipratropium-albuterol (DUO-NEB) 0.5-2.5 mg/3 ml nebulizer Take 3 mL by nebulization Every 4 (Four) Hours As Needed for Wheezing. 360 mL  5/6/2022 at Unknown time   • levothyroxine (SYNTHROID, LEVOTHROID) 50 MCG tablet Take 50 mcg by mouth Every Morning.   5/6/2022 at Unknown time   • Metoprolol Tartrate 37.5 MG tablet Take 37.5 mg by mouth 2 (Two) Times a Day. Hold for SBP <110 or HR < 60   5/6/2022 at Unknown time   • midodrine (PROAMATINE) 10 MG tablet Take 10 mg by mouth 3 (Three) Times a Day Before Meals. Hold for BP > 140/90   5/6/2022 at Unknown time   • omeprazole (priLOSEC) 20 MG capsule Take  20 mg by mouth Daily.   5/6/2022 at Unknown time   • polyethylene glycol (MiraLax) 17 g packet Take 17 g by mouth 2 (Two) Times a Day.   5/6/2022 at Unknown time   • sertraline (ZOLOFT) 50 MG tablet Take 50 mg by mouth Daily.   5/6/2022 at Unknown time   • vitamin B-12 (CYANOCOBALAMIN) 1000 MCG tablet Take 1,000 mcg by mouth Daily.   5/6/2022 at Unknown time   • bisacodyl (DULCOLAX) 10 MG suppository Insert 10 mg into the rectum Daily As Needed for Constipation.   Unknown at Unknown time   • magnesium hydroxide (MILK OF MAGNESIA) 400 MG/5ML suspension Take 30 mL by mouth Daily As Needed for Constipation.   Unknown at Unknown time   • ondansetron (ZOFRAN) 4 MG tablet Take 4 mg by mouth Every 8 (Eight) Hours As Needed for Nausea or Vomiting.   Unknown at Unknown time   • phenylephrine-mineral oil-petrolatum (Preparation H) 0.25-14-74.9 % ointment hemorrhoidal ointment Insert 1 application into the rectum Daily As Needed.   Unknown at Unknown time       Scheduled Meds:cefTRIAXone, 2 g, Intravenous, Q24H  insulin lispro, 0-7 Units, Subcutaneous, TID AC  sodium chloride, 10 mL, Intravenous, Q12H      Continuous Infusions:   PRN Meds:.•  acetaminophen **OR** acetaminophen **OR** acetaminophen  •  aluminum-magnesium hydroxide-simethicone  •  dextrose  •  dextrose  •  glucagon (human recombinant)  •  insulin lispro **AND** insulin lispro  •  melatonin  •  ondansetron **OR** ondansetron  •  sodium chloride  •  sodium chloride    ALLERGIES:  Codeine    ROS:  The following systems were reviewed;   Constitution:  No fevers, chills, no unintentional weight loss  Skin: no rash, no jaundice  Eyes:  No blurry vision, no eye pain  HENT:  No change in hearing or smell  Resp:  No cough, dyspnea  CV:  No chest pain or palpitations  :  No dysuria, hematuria  Musculoskeletal:  No leg cramps or arthralgias  Neuro:  No tremor, no numbness  Psych:  No depression or confusion    Objective     Vital Signs:   Vitals:    05/08/22 1448  05/08/22 1912 05/09/22 0344 05/09/22 0345   BP: 108/64 114/69 117/71 112/75   BP Location:  Right arm Right arm Right arm   Patient Position:  Lying Lying Lying   Pulse: 120 119 116 91   Resp: 18 18 18 18   Temp: 97.5 °F (36.4 °C) 97.7 °F (36.5 °C) 98.2 °F (36.8 °C) 98 °F (36.7 °C)   TempSrc:  Oral Oral Oral   SpO2: 92% 98% 99% 94%   Weight:       Height:           Physical Exam:       General Appearance:    Awake and alert, in no acute distress   Head:    Normocephalic, without obvious abnormality, atraumatic   Throat:   No oral lesions, no thrush, oral mucosa moist   Lungs:     Respirations regular, even and unlabored   Chest Wall:    No abnormalities observed   Abdomen:     Soft, non-tender, no rebound or guarding, non-distended   Rectal:     Deferred   Extremities:   Moves all extremities, no edema, no cyanosis   Pulses:   Pulses palpable and equal bilaterally   Skin:   No rash, no jaundice, normal palpation   Lymph nodes:   No cervical, supraclavicular or submandibular palpable adenopathy   Neurologic:   Cranial nerves 2 - 12 grossly intact, no asterixis       Results Review:   I reviewed the patient's labs and imaging.  CBC    Results from last 7 days   Lab Units 05/09/22 0528 05/08/22 0446 05/07/22 0417 05/06/22  1045   WBC 10*3/mm3 6.90 6.10 6.70 5.20   HEMOGLOBIN g/dL 10.8* 10.9* 10.9* 11.0*   PLATELETS 10*3/mm3 200 173 163 203     CMP   Results from last 7 days   Lab Units 05/09/22 0228 05/08/22 0446 05/07/22 0417 05/06/22  1045   SODIUM mmol/L 130* 133* 134* 138   POTASSIUM mmol/L 4.0 4.0 3.9 3.8   CHLORIDE mmol/L 93* 97* 97* 95*   CO2 mmol/L 24.0 25.0 26.0 30.0*   BUN mg/dL 24* 19 12 15   CREATININE mg/dL 3.39* 2.94* 2.24* 2.74*   GLUCOSE mg/dL 133* 116* 164* 99   ALBUMIN g/dL  --  2.60*  --  3.10*   BILIRUBIN mg/dL  --  0.4  --  0.7   ALK PHOS U/L  --  52  --  59   AST (SGOT) U/L  --  12  --  11   ALT (SGPT) U/L  --  5  --  5   MAGNESIUM mg/dL 1.9 2.0  --   --    PHOSPHORUS mg/dL 2.2* 2.2*  --    --      Cr Clearance Estimated Creatinine Clearance: 24.1 mL/min (A) (by C-G formula based on SCr of 3.39 mg/dL (H)).  Coag     HbA1C   Lab Results   Component Value Date    HGBA1C 6.2 (H) 05/06/2022    HGBA1C 6.3 (H) 03/12/2022    HGBA1C 7.4 (H) 07/21/2021     Blood Glucose   Glucose   Date/Time Value Ref Range Status   05/09/2022 0727 124 (H) 70 - 105 mg/dL Final     Comment:     Serial Number: [id number]:  [id number]   05/08/2022 1906 145 (H) 70 - 105 mg/dL Final     Comment:     Serial Number: [id number]:  [id number]   05/08/2022 1634 134 (H) 70 - 105 mg/dL Final     Comment:     Serial Number: [id number]:  [id number]   05/08/2022 1219 110 (H) 70 - 105 mg/dL Final     Comment:     Serial Number: [id number]:  [id number]   05/08/2022 0709 119 (H) 70 - 105 mg/dL Final     Comment:     Serial Number: [id number]:  [id number]   05/07/2022 1634 142 (H) 70 - 105 mg/dL Final     Comment:     Serial Number: [id number]:  [id number]   05/07/2022 1059 132 (H) 70 - 105 mg/dL Final     Comment:     Serial Number: [id number]:  [id number]   05/07/2022 0713 129 (H) 70 - 105 mg/dL Final     Comment:     Serial Number: [id number]:  [id number]     Infection   Results from last 7 days   Lab Units 05/07/22  1519 05/06/22  1116 05/06/22  1057   BLOODCX  No growth at 24 hours Streptococcus gallolyticus ssp pasteurianus* Streptococcus gallolyticus ssp pasteurianus*   BCIDPCR   --  Streptococcus spp, not A, B, or pneumonia. Identification by BCID2 PCR.*  --      UA      Radiology(recent) CT Chest Without Contrast Diagnostic    Result Date: 5/7/2022   1. Moderate to large right and small-to-moderate left pleural effusions with significant passive atelectasis. Aerated lung occupies approximately 40% of the anatomic lung space. 2. Enlarged main pulmonary artery suggesting increased right heart pressure. 3. Coronary artery disease status post CABG. 5. Marked atrophy of the musculature diffusely.  6. Gynecomastia.  Electronically Signed By-Rigoberto Hill MD On:5/7/2022 5:45 PM This report was finalized on [id number] by  Rigoberto Hill MD.         ASSESSMENT:  -Strep bovis bacteremia  -Nausea  -Normocytic anemia  -Hypophosphatemia  -Hyponatremia  -Chronic respiratory failure secondary to fluid overload  -ESRD on HD  -Chronically elevated troponin  -CHF  -CAD s/p stent placement  -History of CABG  -A. Fib on Eliquis  -History of CVA with right-sided weakness  -COPD on home O2  -ANNETTE  -Hypertension  -Hyperlipidemia  -Dementia      PLAN:  Patient presented on 5/6 with complaints of shortness of breath from dialysis.  Diagnosed with chronic respiratory failure secondary to fluid overload.  Blood cultures have been positive for strep bovis bacteremia.  Last colonoscopy was in 2006.  We will proceed with colonoscopy in the morning for further evaluation of possible colon malignancy leading to bacteremia.  Clear liquid diet.  N.p.o. following bowel prep in the morning.  Hemoglobin is currently stable at 10.8.  Continue to monitor H/H and transfuse as needed.  We will hold Eliquis in preparation for endoscopy tomorrow.  Continue antimicrobial therapy per infectious disease.  Continue PPI.  Antiemetics as needed.  Supportive care.      I discussed the patients findings and my recommendations with the patient.  I will discuss case with Dr. Keane and change plan accordingly.    We appreciate the referral.    Electronically signed by FABIOLA Bradford, 05/09/22, 11:13 AM EDT.

## 2022-05-10 ENCOUNTER — INPATIENT HOSPITAL (AMBULATORY)
Dept: URBAN - METROPOLITAN AREA HOSPITAL 84 | Facility: HOSPITAL | Age: 72
End: 2022-05-10

## 2022-05-10 DIAGNOSIS — D12.4 BENIGN NEOPLASM OF DESCENDING COLON: ICD-10-CM

## 2022-05-10 DIAGNOSIS — D50.0 IRON DEFICIENCY ANEMIA SECONDARY TO BLOOD LOSS (CHRONIC): ICD-10-CM

## 2022-05-10 DIAGNOSIS — D12.2 BENIGN NEOPLASM OF ASCENDING COLON: ICD-10-CM

## 2022-05-10 DIAGNOSIS — R78.81 BACTEREMIA: ICD-10-CM

## 2022-05-10 PROCEDURE — 45385 COLONOSCOPY W/LESION REMOVAL: CPT | Performed by: INTERNAL MEDICINE

## 2022-05-10 NOTE — PROGRESS NOTES
RENAL/KCC:    Name: Benson Mclean  Age: 71 y.o.  : 1950  Sex: male    05/10/22    Subjective  Patient feels well, no complaints.  S/P HD yesterday.       Objective:    Vital Signs  Temp:  [96.6 °F (35.9 °C)-98.2 °F (36.8 °C)] 97.4 °F (36.3 °C)  Heart Rate:  [104-123] 104  Resp:  [16-20] 18  BP: ()/(61-80) 120/80        Intake/Output Summary (Last 24 hours) at 5/10/2022 0906  Last data filed at 2022 1356  Gross per 24 hour   Intake --   Output 2000 ml   Net -2000 ml           Physical Exam  Physical Exam  Constitutional:       General: He is not in acute distress.     Appearance: He is not diaphoretic.   HENT:      Head: Normocephalic and atraumatic.      Nose: Nose normal.   Eyes:      Pupils: Pupils are equal, round, and reactive to light.   Neck:      Thyroid: No thyromegaly.      Vascular: No JVD.      Trachea: No tracheal deviation.   Cardiovascular:      Rate and Rhythm: Normal rate and regular rhythm.      Heart sounds: No murmur heard.    No friction rub. No gallop.   Pulmonary:      Effort: Pulmonary effort is normal. No respiratory distress.      Breath sounds: No stridor. No wheezing or rales.   Chest:      Chest wall: No tenderness.   Abdominal:      General: Bowel sounds are normal.      Palpations: Abdomen is soft. There is no mass.      Tenderness: There is no abdominal tenderness. There is no guarding or rebound.      Hernia: No hernia is present.   Musculoskeletal:         General: No tenderness or deformity. Normal range of motion.      Cervical back: Normal range of motion and neck supple.   Skin:     General: Skin is warm and dry.      Coloration: Skin is not pale.      Findings: No rash.   Neurological:      Mental Status: He is alert and oriented to person, place, and time.      Cranial Nerves: No cranial nerve deficit.      Motor: No abnormal muscle tone.      Coordination: Coordination normal.      Deep Tendon Reflexes: Reflexes are normal and symmetric. Reflexes normal.    Psychiatric:         Behavior: Behavior normal.         Thought Content: Thought content normal.         Judgment: Judgment normal.            Results Review:      Results from last 7 days   Lab Units 05/10/22  0706 05/09/22 0228 05/08/22 0446 05/07/22 0417 05/06/22  1045   SODIUM mmol/L 135* 130* 133* 134* 138   CO2 mmol/L 24.0 24.0 25.0 26.0 30.0*   BUN mg/dL 17 24* 19 12 15   CREATININE mg/dL 2.86* 3.39* 2.94* 2.24* 2.74*   CALCIUM mg/dL 8.7 7.9* 8.1* 8.1* 8.1*   ALBUMIN g/dL 2.90*  --  2.60*  --  3.10*   AST (SGOT) U/L 12  --  12  --  11   ALT (SGPT) U/L 6  --  5  --  5   EGFR mL/min/1.73 22.8* 18.6* 22.1* 30.6* 24.0*       Results from last 7 days   Lab Units 05/10/22  0706 05/09/22  0528 05/08/22 0446 05/07/22 0417 05/06/22  1045   WBC 10*3/mm3 6.70 6.90 6.10 6.70 5.20   INR  1.09  --   --   --   --          Medication Review:   atorvastatin, 40 mg, Oral, Nightly  budesonide, 0.5 mg, Nebulization, BID  cefTRIAXone, 2 g, Intravenous, Q24H  donepezil, 10 mg, Oral, Nightly  insulin lispro, 0-7 Units, Subcutaneous, TID AC  ipratropium-albuterol, 3 mL, Nebulization, TID - RT  levothyroxine, 50 mcg, Oral, Q AM  metoprolol tartrate, 37.5 mg, Oral, BID  midodrine, 10 mg, Oral, TID AC  pantoprazole, 40 mg, Oral, QAM  polyethylene glycol, 17 g, Oral, BID  sertraline, 50 mg, Oral, Daily  sodium chloride, 10 mL, Intravenous, Q12H  sodium-potassium-magnesium sulfates, 1 bottle, Oral, Q12H  sorbitol, 50 mL, Oral, Daily          Assessment:    End-stage renal disease    Bacteremia    SOA    CHF    History of CVA    History of coronary artery disease    Diabetes      Plan:  F/U labs  HD MWF  BP stable, on Midodrine  ABX per ID - GI following for C-scope and Cardiology following to r/o endocarditis  Will follow      Yony Bhat MD  05/10/22  09:06 EDT  Tel: [phone]  Fax: [phone]

## 2022-05-10 NOTE — PROGRESS NOTES
Infectious Diseases Progress Note      LOS: 1 day   Patient Care Team:  Rudolph Rooney MD as PCP - General (Family Medicine)  Samantha Zabala APRN as PCP - Family Medicine  Jose G Rm MD as Consulting Physician (Cardiology)    Chief Complaint: Shortness of breath    Subjective       The patient has been afebrile for the last 24 hours.  The patient is on 3 L of oxygen by nasal cannula, hemodynamically stable, and is tolerating antimicrobial therapy.       Review of Systems:   Review of Systems   Constitutional: Positive for fatigue.   HENT: Negative.    Eyes: Negative.    Respiratory: Positive for shortness of breath.    Cardiovascular: Negative.    Gastrointestinal: Negative.    Endocrine: Negative.    Genitourinary: Negative.    Musculoskeletal: Negative.    Skin: Negative.    Neurological: Negative.    Psychiatric/Behavioral: Negative.    All other systems reviewed and are negative.       Objective     Vital Signs  Temp:  [96.6 °F (35.9 °C)-98.2 °F (36.8 °C)] 97.4 °F (36.3 °C)  Heart Rate:  [] 97  Resp:  [16-20] 18  BP: ()/(61-80) 104/69    Physical Exam:  Physical Exam  Vitals and nursing note reviewed.   Constitutional:       General: He is not in acute distress.     Appearance: Normal appearance. He is well-developed and normal weight. He is not diaphoretic.   HENT:      Head: Normocephalic and atraumatic.   Eyes:      Conjunctiva/sclera: Conjunctivae normal.      Pupils: Pupils are equal, round, and reactive to light.   Cardiovascular:      Rate and Rhythm: Normal rate and regular rhythm.      Heart sounds: Normal heart sounds, S1 normal and S2 normal.   Pulmonary:      Effort: Pulmonary effort is normal. No respiratory distress.      Breath sounds: No stridor. Rales present. No wheezing.   Abdominal:      General: Bowel sounds are normal. There is no distension.      Palpations: Abdomen is soft. There is no mass.      Tenderness: There is no abdominal tenderness. There is no guarding.    Musculoskeletal:         General: No deformity. Normal range of motion.      Cervical back: Neck supple.   Skin:     General: Skin is warm and dry.      Coloration: Skin is not pale.      Findings: No erythema or rash.   Neurological:      Mental Status: He is alert and oriented to person, place, and time.      Cranial Nerves: No cranial nerve deficit.   Psychiatric:         Mood and Affect: Mood normal.          Results Review:    I have reviewed all clinical data, test, lab, and imaging results.     Radiology  No Radiology Exams Resulted Within Past 24 Hours    Cardiology    Laboratory    Results from last 7 days   Lab Units 05/10/22  0706 05/09/22  0528 05/08/22  0446 05/07/22  0417 05/06/22  1045   WBC 10*3/mm3 6.70 6.90 6.10 6.70 5.20   HEMOGLOBIN g/dL 11.9* 10.8* 10.9* 10.9* 11.0*   HEMATOCRIT % 39.4 34.6* 35.8* 35.7* 35.8*   PLATELETS 10*3/mm3 213 200 173 163 203     Results from last 7 days   Lab Units 05/10/22  0706 05/09/22 0228 05/08/22 0446 05/07/22  0417 05/06/22  1045   SODIUM mmol/L 135* 130* 133* 134* 138   POTASSIUM mmol/L 4.1 4.0 4.0 3.9 3.8   CHLORIDE mmol/L 97* 93* 97* 97* 95*   CO2 mmol/L 24.0 24.0 25.0 26.0 30.0*   BUN mg/dL 17 24* 19 12 15   CREATININE mg/dL 2.86* 3.39* 2.94* 2.24* 2.74*   GLUCOSE mg/dL 132* 133* 116* 164* 99   ALBUMIN g/dL 2.90*  --  2.60*  --  3.10*   BILIRUBIN mg/dL 0.4  --  0.4  --  0.7   ALK PHOS U/L 56  --  52  --  59   AST (SGOT) U/L 12  --  12  --  11   ALT (SGPT) U/L 6  --  5  --  5   CALCIUM mg/dL 8.7 7.9* 8.1* 8.1* 8.1*                 Microbiology   Microbiology Results (last 10 days)     Procedure Component Value - Date/Time    COVID PRE-OP / PRE-PROCEDURE SCREENING ORDER (NO ISOLATION) - Swab, Nasopharynx [[id number]]  (Normal) Collected: 05/10/22 0635    Lab Status: Final result Specimen: Swab from Nasopharynx Updated: 05/10/22 1143    Narrative:      The following orders were created for panel order COVID PRE-OP / PRE-PROCEDURE SCREENING ORDER (NO ISOLATION)  - Swab, Nasopharynx.  Procedure                               Abnormality         Status                     ---------                               -----------         ------                     COVID-19,CEPHEID/ROHAN,CO...[[id number]]  Normal              Final result                 Please view results for these tests on the individual orders.    COVID-19,CEPHEID/ROHAN,COR/ZEYNEP/PAD/BEATRICE IN-HOUSE(OR EMERGENT/ADD-ON),NP SWAB IN TRANSPORT MEDIA 3-4 HR TAT, RT-PCR - Swab, Nasopharynx [[id number]]  (Normal) Collected: 05/10/22 0635    Lab Status: Final result Specimen: Swab from Nasopharynx Updated: 05/10/22 1143     COVID19 Not Detected    Narrative:      Fact sheet for providers: https://www.fda.gov/media/290924/download     Fact sheet for patients: https://www.fda.gov/media/364539/download  Fact sheet for providers: https://www.fda.gov/media/483614/download     Fact sheet for patients: https://www.fda.gov/media/505605/download    Blood Culture - Blood, Hand, Right [[id number]]  (Normal) Collected: 05/07/22 1519    Lab Status: Preliminary result Specimen: Blood from Hand, Right Updated: 05/09/22 1615     Blood Culture No growth at 2 days    CANDIDA AURIS SCREEN - Swab, Axilla Right, Axilla Left and Groin [[id number]]  (Normal) Collected: 05/07/22 1020    Lab Status: Preliminary result Specimen: Swab from Axilla Right, Axilla Left and Groin Updated: 05/10/22 1032     Candida Auris Screen Culture No Candida auris isolated at 3 days    COVID PRE-OP / PRE-PROCEDURE SCREENING ORDER (NO ISOLATION) - Swab, Nasopharynx [[id number]]  (Normal) Collected: 05/06/22 1519    Lab Status: Final result Specimen: Swab from Nasopharynx Updated: 05/06/22 1542    Narrative:      The following orders were created for panel order COVID PRE-OP / PRE-PROCEDURE SCREENING ORDER (NO ISOLATION) - Swab, Nasopharynx.  Procedure                               Abnormality         Status                     ---------                               -----------          ------                     COVID-19,CEPHEID/ROHAN,CO...[[id number]]  Normal              Final result                 Please view results for these tests on the individual orders.    COVID-19,CEPHEID/ROHAN,COR/ZEYNEP/PAD/BEATRICE IN-HOUSE(OR EMERGENT/ADD-ON),NP SWAB IN TRANSPORT MEDIA 3-4 HR TAT, RT-PCR - Swab, Nasopharynx [[id number]]  (Normal) Collected: 05/06/22 1519    Lab Status: Final result Specimen: Swab from Nasopharynx Updated: 05/06/22 1542     COVID19 Not Detected    Narrative:      Fact sheet for providers: https://www.fda.gov/media/505748/download     Fact sheet for patients: https://www.fda.gov/media/126757/download  Fact sheet for providers: https://www.fda.gov/media/628825/download    Fact sheet for patients: https://www.fda.gov/media/502697/download    Test performed by PCR.    Blood Culture - Blood, Hand, Right [[id number]]  (Abnormal)  (Susceptibility) Collected: 05/06/22 1116    Lab Status: Final result Specimen: Blood from Hand, Right Updated: 05/09/22 0703     Blood Culture Streptococcus gallolyticus ssp pasteurianus     Isolated from Aerobic and Anaerobic Bottles     Gram Stain Anaerobic Bottle Gram positive cocci      Aerobic Bottle Gram positive cocci    Susceptibility      Streptococcus gallolyticus ssp pasteurianus      MONROE      Ceftriaxone Susceptible      Penicillin G Susceptible      Vancomycin Susceptible                           Blood Culture ID, PCR - Blood, Hand, Right [[id number]]  (Abnormal) Collected: 05/06/22 1116    Lab Status: Final result Specimen: Blood from Hand, Right Updated: 05/07/22 0216     BCID, PCR Streptococcus spp, not A, B, or pneumonia. Identification by BCID2 PCR.     BOTTLE TYPE Anaerobic Bottle    Blood Culture - Blood, Arm, Right [[id number]]  (Abnormal) Collected: 05/06/22 1057    Lab Status: Final result Specimen: Blood from Arm, Right Updated: 05/09/22 0704     Blood Culture Streptococcus gallolyticus ssp pasteurianus     Isolated from Aerobic and Anaerobic  Bottles     Gram Stain Anaerobic Bottle Gram positive cocci      Aerobic Bottle Gram positive cocci    Narrative:      Refer to previous blood culture collected on 5/6/2022 1116 for MICs.            Medication Review:       Schedule Meds  atorvastatin, 40 mg, Oral, Nightly  budesonide, 0.5 mg, Nebulization, BID  cefTRIAXone, 2 g, Intravenous, Q24H  donepezil, 10 mg, Oral, Nightly  insulin lispro, 0-7 Units, Subcutaneous, TID AC  ipratropium-albuterol, 3 mL, Nebulization, TID - RT  levothyroxine, 50 mcg, Oral, Q AM  metoprolol tartrate, 37.5 mg, Oral, BID  midodrine, 10 mg, Oral, TID AC  pantoprazole, 40 mg, Oral, QAM  polyethylene glycol, 17 g, Oral, BID  sertraline, 50 mg, Oral, Daily  sodium chloride, 10 mL, Intravenous, Q12H  sodium-potassium-magnesium sulfates, 1 bottle, Oral, Q12H  sorbitol, 50 mL, Oral, Daily        Infusion Meds       PRN Meds  •  acetaminophen **OR** acetaminophen **OR** acetaminophen  •  aluminum-magnesium hydroxide-simethicone  •  dextrose  •  dextrose  •  glucagon (human recombinant)  •  insulin lispro **AND** insulin lispro  •  ipratropium-albuterol  •  melatonin  •  ondansetron **OR** ondansetron  •  oxyCODONE  •  sodium chloride  •  sodium chloride        Assessment/Plan       Antimicrobial Therapy   1.  IV ceftriaxone        2.        3.        4.        5.            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  We need to rule out endocarditis or GI malignancy     The patient presented hospital with shortness of breath and chest x-ray showed density at the right base.  Need to rule out pneumonia  -CT showed some large bilateral pleural effusions but no obvious pneumonia which most likely secondary to volume overload  -Patient is currently on 2 L of oxygen by cannula     End-stage renal disease on hemodialysis.  Patient had right chest tunneled dialysis catheter and left arm AV fistula     History of  CVA     Plan     Continue ceftriaxone 2 g IV daily  Awaiting scheduling for the [facility]  Colonoscopy planned for later today  Supportive care  A.mClaire Valencia, APRN  05/10/22  12:14 EDT    Note is dictated utilizing voice recognition software/Dragon

## 2022-05-10 NOTE — NURSING NOTE
While assisting the CNA to help clean pt up from bowel movement pt told nurse he was not taking the medication they gave him for the colonoscopy prep again for it made him sick. Attempt to obtain more information from pt was not successful , symptoms were nausea vomiting cramping and just made him death sick. Discuss with pt that parts of these were side effectives and could administer prn Zofran before next dose, again pt stated he was not taken the surprep again .   Call place to on call MD to inform pt requesting another prep

## 2022-05-10 NOTE — PROGRESS NOTES
Referring Provider: Jeff Black MD    Reason for follow-up:  Shortness of breath       Patient Care Team:  Rudolph Rooney MD as PCP - General (Family Medicine)  Samantha Zabala APRN as PCP - Family Medicine  Jose G Rm MD as Consulting Physician (Cardiology)    Subjective .      ROS    Since I have last seen, the patient has been without any chest discomfort ,shortness of breath, palpitations, dizziness or syncope.  Denies having any headache ,abdominal pain ,nausea, vomiting , diarrhea constipation, loss of weight or loss of appetite.  Denies having any excessive bruising ,hematuria or blood in the stool.    Review of all systems negative except as indicated    History  Past Medical History:   Diagnosis Date   • A-fib ([facility]) 8/3/2021   • Anemia    • Appetite loss    • Arthritis    • Brain bleed ([facility])    • Carpal tunnel syndrome on left    • CHF (congestive heart failure) ([facility])    • Chronic left-sided low back pain without sciatica 12/27/2017   • CKD (chronic kidney disease) stage 3, GFR 30-59 ml/min ([facility])    • Closed fracture of seventh thoracic vertebra ([facility]) 8/23/2020   • Cognitive communication deficit 12/1/2020   • COPD (chronic obstructive pulmonary disease) ([facility])    • Coronary artery disease    • COVID-19 2/19/2021   • Cytokine release syndrome, grade 1 5/24/2021   • Depression    • Diabetic neuropathy ([facility])    • Dialysis patient ([facility])     mon wed fri   • DM2 (diabetes mellitus, type 2) ([facility])    • GERD (gastroesophageal reflux disease)    • Hyperlipidemia    • Hypertension    • Hypogonadism in male    • Neuropathy    • Nonsustained ventricular tachycardia ([facility]) 7/23/2021   • Obesity    • Paroxysmal atrial fibrillation ([facility]) 12/1/2020   • Sleep apnea    • Stroke ([facility])    • Unsteady gait        Past Surgical History:   Procedure Laterality Date   • ANGIOPLASTY      X2   • APPENDECTOMY     • ARTERIOVENOUS FISTULA/SHUNT SURGERY Left 1/20/2022    Procedure: LEFT BRACHIAL CEPHALIC ARTERIAL VENOUS  FISTULA CREATION;  Surgeon: Yony Davila MD;  Location: [facility] MAIN OR;  Service: Vascular;  Laterality: Left;   • CARDIAC CATHETERIZATION  11/13/2015   • CARDIAC CATHETERIZATION N/A 5/24/2021    Procedure: Left Heart Cath and coronary angiogram;  Surgeon: Jose G Rm MD;  Location:  [facility] CATH INVASIVE LOCATION;  Service: Cardiovascular;  Laterality: N/A;   • CARDIAC CATHETERIZATION  5/24/2021    Procedure: Saphenous Vein Graft;  Surgeon: Jose G Rm MD;  Location:  [facility] CATH INVASIVE LOCATION;  Service: Cardiovascular;;   • CARDIAC CATHETERIZATION N/A 5/24/2021    Procedure: Percutaneous Coronary Intervention;  Surgeon: Bernabe Zambrano MD;  Location:  [facility] CATH INVASIVE LOCATION;  Service: Cardiology;  Laterality: N/A;   • CARDIAC CATHETERIZATION N/A 5/24/2021    Procedure: Stent NIELS coronary;  Surgeon: Bernabe Zambrano MD;  Location:  [facility] CATH INVASIVE LOCATION;  Service: Cardiology;  Laterality: N/A;   • CARDIAC CATHETERIZATION N/A 5/26/2021    Procedure: Percutaneous Coronary Intervention;  Surgeon: Bernabe Zambrano MD;  Location:  [facility] CATH INVASIVE LOCATION;  Service: Cardiovascular;  Laterality: N/A;   • CARDIAC CATHETERIZATION N/A 5/26/2021    Procedure: Stent NIELS bypass graft;  Surgeon: Bernabe Zambrano MD;  Location:  [facility] CATH INVASIVE LOCATION;  Service: Cardiovascular;  Laterality: N/A;   • CARDIAC ELECTROPHYSIOLOGY PROCEDURE N/A 5/24/2021    Procedure: Temporary Pacemaker;  Surgeon: Bernabe Zambrano MD;  Location:  [facility] CATH INVASIVE LOCATION;  Service: Cardiology;  Laterality: N/A;   • CARDIAC ELECTROPHYSIOLOGY PROCEDURE N/A 5/26/2021    Procedure: Temporary Pacemaker;  Surgeon: Bernabe Zambrano MD;  Location: [facility] CATH INVASIVE LOCATION;  Service: Cardiovascular;  Laterality: N/A;   • CARPAL TUNNEL RELEASE Left 04/29/2018    carpal tunnel- lt hand// other hand surgeries    • CATARACT EXTRACTION, BILATERAL  2002    Dr. Lux Acosta   • CHOLECYSTECTOMY     • COLON RESECTION  2005   •  CORONARY ANGIOPLASTY     • CORONARY ANGIOPLASTY WITH STENT PLACEMENT  11/13/2015    PTCA stent to proximal in stent and mid to distal lad   • CORONARY ANGIOPLASTY WITH STENT PLACEMENT  09/16/2016    PTCA stent to mid lad and stent to vein graft to marginal   • CORONARY ARTERY BYPASS GRAFT  2005    @ [facility]   • CYST REMOVAL      cyst removed from scrotum   • FOOT SURGERY Right 07/17/2018   • FOOT SURGERY Left 02/04/2019   • PORTACATH PLACEMENT     • TOE AMPUTATION Right     right toe removed D/T infected cut that went to the bone       Family History   Problem Relation Age of Onset   • Heart disease Mother    • Heart disease Father    • Diabetes Sister    • Heart disease Sister    • Diabetes Brother    • Mental illness Brother        Social History     Tobacco Use   • Smoking status: Former Smoker     Packs/day: 1.00     Years: 60.00     Pack years: 60.00     Types: Cigarettes   • Smokeless tobacco: Never Used   • Tobacco comment: Patient quit smoking 11/2020   Vaping Use   • Vaping Use: Never used   Substance Use Topics   • Alcohol use: No   • Drug use: Yes     Frequency: 1.0 times per week     Types: Marijuana     Comment: socially        Medications Prior to Admission   Medication Sig Dispense Refill Last Dose   • albuterol sulfate  (90 Base) MCG/ACT inhaler Inhale 2 puffs Every 4 (Four) Hours.   5/6/2022 at Unknown time   • apixaban (ELIQUIS) 5 MG tablet tablet Take 1 tablet by mouth Every 12 (Twelve) Hours. Indications: Atrial Fibrillation 60 tablet  5/6/2022 at Unknown time   • atorvastatin (LIPITOR) 40 MG tablet Take 40 mg by mouth Every Night.   5/5/2022 at Unknown time   • budesonide (Pulmicort) 0.5 MG/2ML nebulizer solution Take 2 mL by nebulization 2 (Two) Times a Day.   5/6/2022 at Unknown time   • cholecalciferol (VITAMIN D3) 25 MCG (1000 UT) tablet Take 1,000 Units by mouth Daily.   5/6/2022 at Unknown time   • docusate sodium (COLACE) 100 MG capsule Take 100 mg by mouth Daily.   5/6/2022 at Unknown  time   • donepezil (ARICEPT) 10 MG tablet Take 10 mg by mouth Every Night.   5/5/2022 at Unknown time   • fluticasone (FLONASE) 50 MCG/ACT nasal spray 2 sprays by Each Nare route 2 (Two) Times a Day.   5/6/2022 at Unknown time   • guaiFENesin (MUCINEX) 600 MG 12 hr tablet Take 600 mg by mouth 2 (Two) Times a Day.   5/6/2022 at Unknown time   • HYDROcodone-acetaminophen (NORCO) 5-325 MG per tablet Take 1 tablet by mouth 3 (Three) Times a Day.   5/6/2022 at Unknown time   • ipratropium-albuterol (DUO-NEB) 0.5-2.5 mg/3 ml nebulizer Take 3 mL by nebulization 3 (Three) Times a Day. 360 mL  5/6/2022 at Unknown time   • ipratropium-albuterol (DUO-NEB) 0.5-2.5 mg/3 ml nebulizer Take 3 mL by nebulization Every 4 (Four) Hours As Needed for Wheezing. 360 mL  5/6/2022 at Unknown time   • levothyroxine (SYNTHROID, LEVOTHROID) 50 MCG tablet Take 50 mcg by mouth Every Morning.   5/6/2022 at Unknown time   • Metoprolol Tartrate 37.5 MG tablet Take 37.5 mg by mouth 2 (Two) Times a Day. Hold for SBP <110 or HR < 60   5/6/2022 at Unknown time   • midodrine (PROAMATINE) 10 MG tablet Take 10 mg by mouth 3 (Three) Times a Day Before Meals. Hold for BP > 140/90   5/6/2022 at Unknown time   • omeprazole (priLOSEC) 20 MG capsule Take 20 mg by mouth Daily.   5/6/2022 at Unknown time   • polyethylene glycol (MiraLax) 17 g packet Take 17 g by mouth 2 (Two) Times a Day.   5/6/2022 at Unknown time   • sertraline (ZOLOFT) 50 MG tablet Take 50 mg by mouth Daily.   5/6/2022 at Unknown time   • vitamin B-12 (CYANOCOBALAMIN) 1000 MCG tablet Take 1,000 mcg by mouth Daily.   5/6/2022 at Unknown time   • bisacodyl (DULCOLAX) 10 MG suppository Insert 10 mg into the rectum Daily As Needed for Constipation.   Unknown at Unknown time   • magnesium hydroxide (MILK OF MAGNESIA) 400 MG/5ML suspension Take 30 mL by mouth Daily As Needed for Constipation.   Unknown at Unknown time   • ondansetron (ZOFRAN) 4 MG tablet Take 4 mg by mouth Every 8 (Eight) Hours As  "Needed for Nausea or Vomiting.   Unknown at Unknown time   • phenylephrine-mineral oil-petrolatum (Preparation H) 0.25-14-74.9 % ointment hemorrhoidal ointment Insert 1 application into the rectum Daily As Needed.   Unknown at Unknown time       Allergies  Codeine    Scheduled Meds:atorvastatin, 40 mg, Oral, Nightly  budesonide, 0.5 mg, Nebulization, BID  cefTRIAXone, 2 g, Intravenous, Q24H  donepezil, 10 mg, Oral, Nightly  insulin lispro, 0-7 Units, Subcutaneous, TID AC  ipratropium-albuterol, 3 mL, Nebulization, TID - RT  levothyroxine, 50 mcg, Oral, Q AM  metoprolol tartrate, 37.5 mg, Oral, BID  midodrine, 10 mg, Oral, TID AC  pantoprazole, 40 mg, Oral, QAM  polyethylene glycol, 17 g, Oral, BID  sertraline, 50 mg, Oral, Daily  sodium chloride, 10 mL, Intravenous, Q12H  sodium-potassium-magnesium sulfates, 1 bottle, Oral, Q12H      Continuous Infusions:   PRN Meds:.•  acetaminophen **OR** acetaminophen **OR** acetaminophen  •  aluminum-magnesium hydroxide-simethicone  •  dextrose  •  dextrose  •  glucagon (human recombinant)  •  insulin lispro **AND** insulin lispro  •  ipratropium-albuterol  •  melatonin  •  ondansetron **OR** ondansetron  •  oxyCODONE  •  sodium chloride  •  sodium chloride    Objective     VITAL SIGNS  Vitals:    05/09/22 1356 05/09/22 1450 05/09/22 1927 05/10/22 0359   BP: 116/64 107/70 97/61 119/74   BP Location: Right arm Right arm Right arm Right arm   Patient Position: Lying Lying Lying Lying   Pulse: (!) 123 120 115 108   Resp: 18 20 18 16   Temp: 96.6 °F (35.9 °C) 97.2 °F (36.2 °C) 98.2 °F (36.8 °C) 97.6 °F (36.4 °C)   TempSrc: Temporal Oral Oral Oral   SpO2:  100% 100% 100%   Weight:       Height:           Flowsheet Rows    Flowsheet Row First Filed Value   Admission Height 177.8 cm (70\") Documented at 05/06/2022 1026   Admission Weight 104 kg (229 lb 0.9 oz) Documented at 05/06/2022 1026            Intake/Output Summary (Last 24 hours) at 5/10/2022 0619  Last data filed at 5/9/2022 " 1356  Gross per 24 hour   Intake 120 ml   Output 2000 ml   Net -1880 ml        TELEMETRY:    Physical Exam:  The patient is alert, oriented and in no distress.  Vital signs as noted above.  Exogenous obesity.  Head and neck revealed no carotid bruits or jugular venous distention.  No thyromegaly or lymphadenopathy is present  Lungs clear.  No wheezing.  Breath sounds are normal bilaterally.  Heart normal first and second heart sounds.  No murmur. No precordial rub is present.  No gallop is present.  Abdomen soft and nontender.  No organomegaly is present.  Extremities with good peripheral pulses without any pedal edema.  Skin warm and dry.  Musculoskeletal system is grossly normal  CNS grossly normal      Results Review:   I reviewed the patient's new clinical results.  Lab Results (last 24 hours)     Procedure Component Value Units Date/Time    POC Glucose Once [[id number]]  (Abnormal) Collected: 05/09/22 2045    Specimen: Blood Updated: 05/09/22 2047     Glucose 142 mg/dL      Comment: Serial Number: [id number]:  [id number]       POC Glucose Once [[id number]]  (Abnormal) Collected: 05/09/22 1622    Specimen: Blood Updated: 05/09/22 1624     Glucose 109 mg/dL      Comment: Serial Number: [id number]:  [id number]       Blood Culture - Blood, Hand, Right [[id number]]  (Normal) Collected: 05/07/22 1519    Specimen: Blood from Hand, Right Updated: 05/09/22 1615     Blood Culture No growth at 2 days    POC Glucose Once [[id number]]  (Normal) Collected: 05/09/22 1447    Specimen: Blood Updated: 05/09/22 1448     Glucose 101 mg/dL      Comment: Serial Number: [id number]:  [id number]       CANDIDA AURIS SCREEN - Swab, Axilla Right, Axilla Left and Groin [[id number]]  (Normal) Collected: 05/07/22 1020    Specimen: Swab from Axilla Right, Axilla Left and Groin Updated: 05/09/22 1030     Candida Auris Screen Culture No Candida auris isolated at 2 days    Hemoglobin A1c [[id number]]  (Abnormal) Collected: 05/06/22  1045    Specimen: Blood Updated: 05/09/22 1008     Hemoglobin A1C 6.2 %     Narrative:      Hemoglobin A1C Reference Range:    <5.7 %        Normal  5.7-6.4 %     Increased risk for diabetes  > 6.4 %        Diabetes       These guidelines have been recommended by the American Diabetic Association for Hgb A1c.      The following 2010 guidelines have been recommended by the American Diabetes Association for Hemoglobin A1c.    HBA1c 5.7-6.4% Increased risk for future diabetes (pre-diabetes)  HBA1c     >6.4% Diabetes      Hepatitis B Surface Antigen [[id number]]  (Normal) Collected: 05/09/22 0702    Specimen: Blood Updated: 05/09/22 0836     Hepatitis B Surface Ag Non-Reactive    POC Glucose Once [[id number]]  (Abnormal) Collected: 05/09/22 0727    Specimen: Blood Updated: 05/09/22 0729     Glucose 124 mg/dL      Comment: Serial Number: [id number]:  [id number]       Blood Culture - Blood, Arm, Right [[id number]]  (Abnormal) Collected: 05/06/22 1057    Specimen: Blood from Arm, Right Updated: 05/09/22 0704     Blood Culture Streptococcus gallolyticus ssp pasteurianus     Isolated from Aerobic and Anaerobic Bottles     Gram Stain Anaerobic Bottle Gram positive cocci      Aerobic Bottle Gram positive cocci    Narrative:      Refer to previous blood culture collected on 5/6/2022 1116 for MICs.      Blood Culture - Blood, Hand, Right [[id number]]  (Abnormal)  (Susceptibility) Collected: 05/06/22 1116    Specimen: Blood from Hand, Right Updated: 05/09/22 0703     Blood Culture Streptococcus gallolyticus ssp pasteurianus     Isolated from Aerobic and Anaerobic Bottles     Gram Stain Anaerobic Bottle Gram positive cocci      Aerobic Bottle Gram positive cocci    Susceptibility      Streptococcus gallolyticus ssp pasteurianus      MONROE      Ceftriaxone Susceptible      Penicillin G Susceptible      Vancomycin Susceptible                    Linear View                         Imaging Results (Last 24 Hours)     ** No results  found for the last 24 hours. **      LAB RESULTS (LAST 7 DAYS)    CBC  Results from last 7 days   Lab Units 05/09/22 0528 05/08/22 0446 05/07/22 0417 05/06/22  1045   WBC 10*3/mm3 6.90 6.10 6.70 5.20   RBC 10*6/mm3 3.65* 3.68* 3.69* 3.78*   HEMOGLOBIN g/dL 10.8* 10.9* 10.9* 11.0*   HEMATOCRIT % 34.6* 35.8* 35.7* 35.8*   MCV fL 94.7 97.2* 96.6 94.5   PLATELETS 10*3/mm3 200 173 163 203       BMP  Results from last 7 days   Lab Units 05/09/22 0228 05/08/22 0446 05/07/22 0417 05/06/22  1045   SODIUM mmol/L 130* 133* 134* 138   POTASSIUM mmol/L 4.0 4.0 3.9 3.8   CHLORIDE mmol/L 93* 97* 97* 95*   CO2 mmol/L 24.0 25.0 26.0 30.0*   BUN mg/dL 24* 19 12 15   CREATININE mg/dL 3.39* 2.94* 2.24* 2.74*   GLUCOSE mg/dL 133* 116* 164* 99   MAGNESIUM mg/dL 1.9 2.0  --   --    PHOSPHORUS mg/dL 2.2* 2.2*  --   --        CMP   Results from last 7 days   Lab Units 05/09/22 0228 05/08/22 0446 05/07/22 0417 05/06/22  1045   SODIUM mmol/L 130* 133* 134* 138   POTASSIUM mmol/L 4.0 4.0 3.9 3.8   CHLORIDE mmol/L 93* 97* 97* 95*   CO2 mmol/L 24.0 25.0 26.0 30.0*   BUN mg/dL 24* 19 12 15   CREATININE mg/dL 3.39* 2.94* 2.24* 2.74*   GLUCOSE mg/dL 133* 116* 164* 99   ALBUMIN g/dL  --  2.60*  --  3.10*   BILIRUBIN mg/dL  --  0.4  --  0.7   ALK PHOS U/L  --  52  --  59   AST (SGOT) U/L  --  12  --  11   ALT (SGPT) U/L  --  5  --  5         BNP        TROPONIN  Results from last 7 days   Lab Units 05/06/22  1828   TROPONIN T ng/mL 0.311*       CoAg        Creatinine Clearance  Estimated Creatinine Clearance: 24.1 mL/min (A) (by C-G formula based on SCr of 3.39 mg/dL (H)).    ABG        Radiology  No radiology results for the last day        EKG                I personally viewed and interpreted the patient's EKG/Telemetry data:    ECHOCARDIOGRAM:    Results for orders placed during the hospital encounter of 05/06/22    Adult Transthoracic Echo Complete w/ Color, Spectral and Contrast if Necessary Per Protocol    Interpretation  Summary  Severe global left ventricular hypokinesis, estimated LV ejection fraction of 20%.  Mild right ventricular enlargement  Moderate left atrial enlargement  Aortic valve not well visualized in short axis.  Dopplers do not reveal any abnormality but  Mitral valve, tricuspid valve appears structurally normal, while mitral and tricuspid regurgitation seen.  Calculated RV systolic pressure of 30 mmHg  No pericardial effusion seen.  Proximal aorta appears normal in size.          STRESS MYOVIEW:    Cardiolite (Tc-99m Sestamibi) stress test    CARDIAC CATHETERIZATION:            OTHER:         Assessment/Plan     Principal Problem:    Fluid overload  Active Problems:    S/P CABG (coronary artery bypass graft)    Primary hypertension    Elevated troponin    ANNETTE treated with BiPAP    Major depressive disorder, recurrent, mild (HCC)    Mixed hyperlipidemia    Flaccid hemiplegia of right dominant side as late effect of cerebral infarction (HCC)    Diabetic neuropathy (HCC)    COPD with acute exacerbation (HCC)    Anemia of renal disease    End stage renal disease (HCC)    Chronic respiratory failure with hypoxia, on home oxygen therapy (HCC)    Hypothyroidism (acquired)    Chronic diastolic CHF (congestive heart failure) (HCC)    Stage 5 chronic kidney disease on chronic dialysis (HCC)    Bacteremia    Normocytic anemia due to blood loss      [[[[[[[[[[[[[[[[[[[[[[[[[    Impression  ==============     -Shortness of breath     - Positive blood cultures for Streptococcus 2 out of 2 sets.  Rule out tunneled catheter infection.     -Acute on chronic fluid overload.      -status post CABG 2005. status post stent to LAD 2009    Status post stent to LAD 11/13/2015  Status post stent to mid LAD and SVG to marginal branch 09/16/2016.  Status post stent to mid LAD 5/24/2021  Status post stent to SVG to RCA 5/26/2021     Cardiac catheterization 5/24/2021 revealed  Left ventricle angiogram was not performed due to pre-existing  renal dysfunction.  Left main coronary artery normal.  Left anterior descending artery has mid segment lengthy 90% disease within the previously placed stent.  Distal left into descending artery has diffuse disease.  Circumflex coronary artery is totally occluded.  (Chronic)  Right coronary artery is chronically occluded.  SVG to marginal branch (jump graft to OM1 and OM 2) is totally occluded (new finding compared to 2015.  SVG to PDA has distal radiolucency.  However no definite obstructive disease is present.       -status post myocardial infarction 2000 and 2002 .      -history of intermittent but mostly persistent atrial fibrillation     -Acute on chronic congestive heart failure.  Compensated at this time.     Recent echocardiogram showed left ventricle dysfunction with ejection fraction of 35%.     -History of right-sided weakness with left MCA territory stroke.-Improved     Transesophageal echocardiogram 11/30/2020.  Biatrial enlargement.  Smoking effect in the left atrium and left ventricle and left atrial appendage.  Mild mitral and aortic regurgitation.  Left ventricle enlargement with diffuse hypocontractility with ejection fraction of 35 to 40%.     Echocardiogram 11/27/2020 revealed left atrial enlargement left ventricle dysfunction with ejection fraction of 40%.  Negative bubble study.      -diabetes hypertension and sleep apnea.  ESRD.     -status post colon surgery (partial colectomy) appendectomy cholecystectomy right 4th toe removal and carpal tunnel surgery      -continued smoking 1 pack per day -abstinence from smoking      -allergy to codeine.  ============   Plan  ================  Shortness of breath  Positive blood cultures for Streptococcus 2 out of 2 sets.  Rule out tunneled catheter infection..  Review echocardiogram     Status post CABG  Patient is not having any angina pectoris or congestive heart failure     Atrial fibrillation-chronic  Rate is better controlled  Patient is on Coreg at  12.5 mg p.o. twice a day amiodarone and Cardizem.      ESRD  Patient is undergoing dialysis.      Status post stent to LAD 5/24/2021.  Status post stent to SVG to RCA 5/26/2021.       Anticoagulation  Patient was on Eliquis 5 mg twice a day.  Observe for toxic effects.  Eliquis is on hold     Echocardiogram 3/12/2022 revealed ejection fraction of 25 to 30%.     Consider biventricular ICD if left ventricular function does not improve or congestive heart failure gets worse.     Further plan will depend on patient's progress.   ]]]]]]]]]]]]]]]]]]]]]]              Jose G Rm MD  05/10/22  06:19 EDT

## 2022-05-10 NOTE — ANESTHESIA POSTPROCEDURE EVALUATION
Patient: Benson Mclean    Procedure Summary     Date: 05/10/22 Room / Location: [facility] ENDOSCOPY 1 / [facility] ENDOSCOPY    Anesthesia Start: 1346 Anesthesia Stop: 1421    Procedure: COLONOSCOPY with polypectomy x3 (N/A ) Diagnosis:       Bacteremia      Normocytic anemia due to blood loss      (Bacteremia [R78.81])      (Normocytic anemia due to blood loss [D50.0])    Surgeons: Herbie Keane MD Provider: Oscar Ruiz MD    Anesthesia Type: general ASA Status: 4          Anesthesia Type: general    Vitals  Vitals Value Taken Time   /136 05/10/22 1421   Temp     Pulse 109 05/10/22 1421   Resp     SpO2 97 % 05/10/22 1421   Vitals shown include unvalidated device data.        Post Anesthesia Care and Evaluation    Patient location during evaluation: PACU  Patient participation: complete - patient cannot participate  Level of consciousness: responsive to light touch, responsive to physical stimuli and responsive to verbal stimuli  Pain score: 0  Pain management: adequate  Airway patency: patent  Anesthetic complications: No anesthetic complications  PONV Status: controlled  Cardiovascular status: acceptable, hemodynamically stable and hypotensive  Respiratory status: acceptable, face mask and oral airway  Hydration status: acceptable    Comments: Satisfactory progress.Patient seen and examined postoperatively; vital signs stable; SpO2 greater than or equal to 90%; cardiopulmonary status being addressed; nausea/vomiting adequately controlled; pain adequately controlled; no apparent anesthesia complications; patient discharged from anesthesia care when discharge criteria were met

## 2022-05-10 NOTE — OP NOTE
COLONOSCOPY Procedure Report    Patient Name:  Benson Mclean  YOB: 1950    Date of Surgery:  5/10/2022     Pre-Op Diagnosis:  Bacteremia [R78.81]  Normocytic anemia due to blood loss [D50.0]       Post-Op Diagnosis Codes:     * Bacteremia [R78.81]     * Normocytic anemia due to blood loss [D50.0]  3 colon polyps removed via cold snare polypectomies  Small internal hemorrhoids  Otherwise normal colonoscopy to the terminal ileum    Procedure/CPT® Codes:      Procedure(s):  COLONOSCOPY with polypectomy x3    Staff:  Surgeon(s):  Herbie Keane MD      Anesthesia: Monitored Anesthesia Care    Description of Procedure:  A description of the procedure as well as risks, benefits and alternative methods were explained to the patient who voiced understanding and signed the corresponding consent form. A physical exam was performed and vital signs were monitored throughout the procedure.    A rectal exam was performed which was normal. An Olympus colonoscope was placed into the rectum and proceeded under direct visualization through the colon until the cecum and appendiceal orifice were identified. Careful visualization occurred upon slow withdraw of the scope. The scope was then retroflexed and the distal rectum was visualized. The quality of the prep was good. The procedure was not difficult and there were no immediate complications.    Specimen:        See Below    Estimated blood loss: none    Complications:  None    Findings:  3 colon polyps removed via cold snare polypectomies (2 descending and 1 ascending the largest was 5 mm)  Small internal hemorrhoids  Otherwise normal colonoscopy to the terminal ileum    Impression:  Colonoscopy with 3 polyps removed and small internal hemorrhoids seen but no malignancy noted    Recommendations:  Follow-up biopsy results  No recall on colonoscopy  We will see as needed in the hospital      Herbie Keane MD     Date: 5/10/2022    Time: 14:14 EDT

## 2022-05-10 NOTE — ANESTHESIA PREPROCEDURE EVALUATION
Anesthesia Evaluation     Patient summary reviewed and Nursing notes reviewed   NPO Solid Status: > 8 hours  NPO Liquid Status: > 8 hours           Airway   Mallampati: III  TM distance: >3 FB  Dental    (+) poor dentition        Pulmonary    (+) COPD, sleep apnea on CPAP, decreased breath sounds,   Cardiovascular   Exercise tolerance: poor (<4 METS)    ECG reviewed  PT is on anticoagulation therapy  Rhythm: irregular    (+) hypertension, past MI  >12 months, CAD, CABG >6 Months, cardiac stents Drug eluting stent within the past 12 months dysrhythmias Atrial Fib, CHF , GARSIA, hyperlipidemia,     ROS comment: CARDS VISIT 9/2021   Plan  ================  Status post CABG  Patient is not having any angina pectoris or congestive heart failure     Atrial fibrillation-chronic  Rate is better controlled  Patient is on Coreg at 12.5 mg p.o. twice a day amiodarone and Cardizem.  Continue amiodarone to 200 mg once a day.     Respiratory insufficiency.-Improved     ESRD  Patient is feeling much better after dialysis was started.  Status post permacath placement 8/23/2021      Status post stent to LAD 5/24/2021.  Status post stent to SVG to RCA 5/26/2021.       Anticoagulation  Patient is on Eliquis 5 mg twice a day.  Observe for toxic effects.     Further plan will depend on patient's progress.     Neuro/Psych  (+) CVA (Rt sided weakness) residual symptoms, weakness, numbness, psychiatric history Depression, dementia,    GI/Hepatic/Renal/Endo    (+) obesity,  GERD,  renal disease ESRD and dialysis, diabetes mellitus type 2 poorly controlled using insulin,     Musculoskeletal     Abdominal   (+) obese,    Substance History - negative use     OB/GYN          Other   arthritis, blood dyscrasia anemia,   history of cancer (CATH 5/2021)    ROS/Med Hx Other: CATH 5/2021  Impression: Successful stent implantation of a drug-eluting stent in the distal portion of the SVG to the RCA and using a temporary pacemaker.   There is haziness  with about 70 to 80% disease in the distal portion of the RCA with DENIZ-3 flow before PCI and 0% with DENIZ-3 flow after PCI    TTE 7/2021:  Mitral valve is structurally normal.  Mild mitral regurgitation.  Tricuspid valve is structurally normal.  Aortic valve is structurally normal.  Pulmonic valve could not be well visualized.  No evidence for tricuspid or aortic regurgitation is seen by Doppler study.  Biatrial and biventricular enlargement is present.  Diffuse left ventricular hypocontractility with ejection fraction of 30%.  Atrial septum is intact.  Aorta is normal.  No pericardial effusion or intracardiac thrombus is seen.   Impression  Mild mitral regurgitation.  Significant biatrial and biventricular enlargement.  Diffuse left ventricular hypocontractility with ejection fraction of 30%.  No pericardial effusion or intracardiac thrombus is seen.                            Anesthesia Plan    ASA 4     general   total IV anesthesia    Anesthetic plan, all risks, benefits, and alternatives have been provided, discussed and informed consent has been obtained with: patient.        CODE STATUS:

## 2022-05-10 NOTE — PROGRESS NOTES
[facility] Medicine Services Daily Progress Note    Patient Name: Benson Mclean  : 1950  MRN: [id number]  Primary Care Physician:  Rudolph Rooney MD  Date of admission: 2022    Previous notes and with minor updates.  Subjective        Chief Complaint: Shortness of breath fluid overload.      Brief history:    Patient is a 71 y.o. 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 Apparently he was sent to the ED for shortness of breath before completing his dialysis treatment. He had one hour left per nursing report. He also apparently did not have dialysis on Wednesday. The patient complains of shortness of breath and cough, possibly a fever. No increased lower extremity edema. He stated he is bedbound. He is a poor historian and not willing to offer a great deal of information.   Patient was seen in the emergency room and in the ED the patient had CXR that showed small right greater than left pleural effusions.  Bibasilar airspace disease, right greater than left, favored to represent atelectasis.  proBNP greater then 70,000, troponin 0.33, creatinine 2.74, BUN 15, normal potassium, normal WBC, normal lactate, Hgb 11.0.  Blood cultures drawn.  COVID-negative.  Pt on his home rate of 3L O2 via nasal cannula. Nephrology was consulted with plans for hemodialysis.  He was admitted to the hospitalist group for further treatment of acute on chronic respiratory failure secondary to fluid overload, needs dialysis.                Patient reports:      5/8/2022.  Patient is complaining that he cannot eat normal food because he is on a mechanical soft diet.  Speech therapy following      5/9/2022.  Patient was seen and examined.  Patient reports having a good night sleep.  Patient is on clear liquid diet now and n.p.o. after midnight.      5/10/2022.  Patient was seen and examined.  Infectious disease consult was completed because of strep bovis bacteremia.  Endoscopy is planned soon.  Eliquis is on hold.         ROS negative except as above    Objective      Vitals:   Temp:  [97.4 °F (36.3 °C)-98.2 °F (36.8 °C)] 97.4 °F (36.3 °C)  Heart Rate:  [] 107  Resp:  [16-18] 17  BP: ()/(61-80) 103/61  Flow (L/min):  [3-4] 3    Physical Exam  Vitals reviewed.   Constitutional:   Patient appears chronically ill.     Comments: Chronically weak appearing   patient is lying comfortably in bed and in no acute distress.  HENT:      Head: Normocephalic.   Cardiovascular:      Rate and Rhythm: Normal rate.   Pulmonary:      Effort: Pulmonary effort is normal.   Abdominal:      General: Abdomen is flat.      Palpations: Abdomen is soft.   Musculoskeletal:         General: Normal range of motion.      Cervical back: Normal range of motion.   Skin:     General: Skin is warm.   Neurological:      General: No focal deficit present.      Mental Status: He is alert and oriented to person, place, and time.   Psychiatric:         Mood and Affect: Mood normal.         Behavior: Behavior normal.        Result Review    Result Review:  I have personally reviewed the results from the time of this admission to 5/10/2022 17:53 EDT and agree with these findings:  [x]  Laboratory  [x]  Microbiology  [x]  Radiology  []  EKG/Telemetry   []  Cardiology/Vascular   []  Pathology  []  Old records  []  Other:  Most notable findings include:     Radiology Results (last 21 days)    Procedure Component Value Units Date/Time   CT Chest Without Contrast  Diagnostic [[id number]] Vinh as Reviewed   Order Status: Completed Collected: 05/07/22 1740    Updated: 05/07/22 1758   Narrative:     Examination: CT CHEST WO CONTRAST DIAGNOSTIC-       Date of Exam: 5/7/2022 5:14 PM       Indication: Possible pneumonia; N18.6-End stage renal disease;   Z99.2-Dependence on renal dialysis; R06.00-Dyspnea, unspecified;   Z91.15-Patient's noncompliance with renal dialysis; W21-Mnvlqwr   effusion, not elsewhere classified.       Comparison: Correlation with chest radiograph 05/06/2022.       Technique: Non-contrast axial volumetric CT imaging of the chest was   performed. Automated exposure control and iterative reconstruction   methods were used.       Findings:   There is moderate to large right and small-to-moderate left pleural   effusion. There is complete atelectasis of the right lower lobe and   atelectasis of approximately 60% of the left lower lobe. There is   partial atelectasis of both upper lobes and the right middle lobe.   Overall, aerated lung occupies approximately 40% of the thoracic cavity.   There is a 7 mm nodule in the subpleural anterior left upper lobe on   axial image 36. There is no pneumothorax. Central airways are patent.   There is a small amount of mucoid debris in the right posterior trachea.       Thyroid, remainder of the trachea and esophagus appear within normal   limits. There is severe native coronary artery calcification. The   patient appears status post median sternotomy and CABG. The heart is   enlarged. There is diminished attenuation of the blood pool suggesting   anemia. The main pulmonary artery is enlarged up to 42 mm suggesting   increased right heart pressure. No pericardial effusion. No pathologic   mediastinal adenopathy.       There is skin thickening and edema in the midline upper back area there   is marked diffuse muscular atrophy. There is a small volume of   perihepatic ascites. There is a left kidney cyst at the inferior pole    measuring 28 mm. There is an exophytic simple right kidney cyst   measuring 24 mm. The patient is status post cholecystectomy. There is   mild bilateral gynecomastia. There is no acute osseous abnormality or   destructive bone lesion. The bones appear demineralized. There are mild   degenerative changes.       Impression:            1. Moderate to large right and small-to-moderate left pleural effusions   with significant passive atelectasis. Aerated lung occupies   approximately 40% of the anatomic lung space.   2. Enlarged main pulmonary artery suggesting increased right heart   pressure.   3. Coronary artery disease status post CABG.   5. Marked atrophy of the musculature diffusely.   6. Gynecomastia.       Electronically Signed By-Rigoberto Hill MD On:5/7/2022 5:45 PM   This report was finalized on [id number] by  Rigoberto Hill MD.          Wounds (last 24 hours)     LDA Wound     Row Name 05/10/22 0702 05/09/22 2315 05/09/22 1959       Wound 05/06/22 1706 Right heel    Wound - Properties Group Placement Date: 05/06/22  -MAGNO Placement Time: 1706 -JP Present on Hospital Admission: Y  -MAGNO Side: Right  -MAGNO Location: heel  -MAGNO    Closure Open to air  -AM Open to air  -AH Open to air  -AH    Base dry;red  -AM dry;red  -AH dry;red  -AH    Drainage Amount none  -AM none  -AH none  -AH    Retired Wound - Properties Group Placement Date: 05/06/22  -MAGNO Placement Time: 1706 -JP Present on Hospital Admission: Y  -MAGNO Side: Right  -MAGNO Location: heel  -MAGNO    Retired Wound - Properties Group Date first assessed: 05/06/22  -MAGNO Time first assessed: 1706  -MAGNO Present on Hospital Admission: Y  -MAGNO Side: Right  -MAGNO Location: heel  -MAGNO       Wound 03/30/22 0343 Left heel    Wound - Properties Group Placement Date: 03/30/22  -AS Placement Time: 0343  -AS Present on Hospital Admission: Y  -AS Side: Left  -AS Location: heel  -AS    Closure Open to air  -AM -- Open to air  -AH    Base dry  -AM -- dry  -AH    Retired Wound -  Properties Group Placement Date: 03/30/22  -AS Placement Time: 0343  -AS Present on Hospital Admission: Y  -AS Side: Left  -AS Location: heel  -AS    Retired Wound - Properties Group Date first assessed: 03/30/22  -AS Time first assessed: 0343  -AS Present on Hospital Admission: Y  -AS Side: Left  -AS Location: heel  -AS       Wound 03/07/22 1805 Right anterior thigh Burn    Wound - Properties Group Placement Date: 03/07/22  -MG Placement Time: 1805  -MG Present on Hospital Admission: Y  -MG Side: Right  -MG Orientation: anterior  -MG Location: thigh  -MG Primary Wound Type: Burn  -KK    Closure Open to air  -AM -- Open to air  -AH    Base scab  -AM -- scab  -AH    Retired Wound - Properties Group Placement Date: 03/07/22  -MG Placement Time: 1805  -MG Present on Hospital Admission: Y  -MG Side: Right  -MG Orientation: anterior  -MG Location: thigh  -MG Primary Wound Type: Burn  -KK    Retired Wound - Properties Group Date first assessed: 03/07/22  -MG Time first assessed: 1805  -MG Present on Hospital Admission: Y  -MG Side: Right  -MG Location: thigh  -MG Primary Wound Type: Burn  -KK       Wound 03/07/22 1806 Left anterior thigh Burn    Wound - Properties Group Placement Date: 03/07/22  -MG Placement Time: 1806  -MG Side: Left  -MG Orientation: anterior  -MG Location: thigh  -MG Primary Wound Type: Burn  -KK    Closure Open to air  -AM -- Open to air  -AH    Base scab  -AM -- scab  -AH    Retired Wound - Properties Group Placement Date: 03/07/22  -MG Placement Time: 1806  -MG Side: Left  -MG Orientation: anterior  -MG Location: thigh  -MG Primary Wound Type: Burn  -KK    Retired Wound - Properties Group Date first assessed: 03/07/22  -MG Time first assessed: 1806  -MG Side: Left  -MG Location: thigh  -MG Primary Wound Type: Burn  -KK       Wound 05/06/22 1710 Right anterior greater trochanter    Wound - Properties Group Placement Date: 05/06/22  -MAGNO Placement Time: 1710  -MAGNO Present on Hospital Admission: Y  -AMGNO  Side: Right  -MAGNO Orientation: anterior  -MAGNO Location: greater trochanter  -MAGNO    Closure Open to air  -AM Open to air  -AH Open to air  -AH    Base non-blanchable;moist;pink  -AM non-blanchable;moist;pink  -AH non-blanchable;moist;pink  -AH    Drainage Amount none  -AM none  -AH none  -AH    Care, Wound -- cleansed with;water;barrier applied  -AH --    Retired Wound - Properties Group Placement Date: 05/06/22  -MAGNO Placement Time: 1710  -MAGNO Present on Hospital Admission: Y  -MAGNO Side: Right  -MAGNO Orientation: anterior  -MAGNO Location: greater trochanter  -MAGNO    Retired Wound - Properties Group Date first assessed: 05/06/22  -MAGNO Time first assessed: 1710  -MAGNO Present on Hospital Admission: Y  -MAGNO Side: Right  -MAGNO Location: greater trochanter  -MAGNO       Wound 03/30/22 0341 Left posterior thigh    Wound - Properties Group Placement Date: 03/30/22  -AS Placement Time: 0341  -AS Present on Hospital Admission: Y  -AS Side: Left  -AS Orientation: posterior  -AS Location: thigh  -AS    Closure Open to air  -AM Open to air  -AH Open to air  -AH    Drainage Amount none  -AM none  -AH none  -AH    Care, Wound -- cleansed with;water;barrier applied  -AH --    Retired Wound - Properties Group Placement Date: 03/30/22  -AS Placement Time: 0341  -AS Present on Hospital Admission: Y  -AS Side: Left  -AS Orientation: posterior  -AS Location: thigh  -AS    Retired Wound - Properties Group Date first assessed: 03/30/22  -AS Time first assessed: 0341  -AS Present on Hospital Admission: Y  -AS Side: Left  -AS Location: thigh  -AS       Wound 03/30/22 0340 perineum Pressure Injury    Wound - Properties Group Placement Date: 03/30/22  -AS Placement Time: 0340  -AS Present on Hospital Admission: Y  -AS Location: perineum  -AS Primary Wound Type: Pressure inj  -AS    Closure Open to air  -AM Open to air  -AH Open to air  -AH    Base non-blanchable;moist;pink  -AM non-blanchable;moist;pink  -AH non-blanchable;moist;pink  -AH    Drainage Amount  none  -AM none  -AH none  -AH    Care, Wound -- cleansed with;water;barrier applied  -AH --    Retired Wound - Properties Group Placement Date: 03/30/22  -AS Placement Time: 0340  -AS Present on Hospital Admission: Y  -AS Location: perineum  -AS Primary Wound Type: Pressure inj  -AS    Retired Wound - Properties Group Date first assessed: 03/30/22  -AS Time first assessed: 0340  -AS Present on Hospital Admission: Y  -AS Location: perineum  -AS Primary Wound Type: Pressure inj  -AS       Wound 01/20/22 1024 Left distal arm Incision    Wound - Properties Group Placement Date: 01/20/22  -EP Placement Time: 1024  -EP Present on Hospital Admission: N  -EP Side: Left  -EP Orientation: distal  -EP Location: arm  -EP Primary Wound Type: Incision  -EP    Retired Wound - Properties Group Placement Date: 01/20/22  -EP Placement Time: 1024  -EP Present on Hospital Admission: N  -EP Side: Left  -EP Orientation: distal  -EP Location: arm  -EP Primary Wound Type: Incision  -EP    Retired Wound - Properties Group Date first assessed: 01/20/22  -EP Time first assessed: 1024  -EP Present on Hospital Admission: N  -EP Side: Left  -EP Location: arm  -EP Primary Wound Type: Incision  -EP       Wound 03/30/22 0342 Right popliteal    Wound - Properties Group Placement Date: 03/30/22  -AS Placement Time: 0342  -AS Present on Hospital Admission: Y  -AS Side: Right  -AS Location: popliteal  -AS    Retired Wound - Properties Group Placement Date: 03/30/22  -AS Placement Time: 0342  -AS Present on Hospital Admission: Y  -AS Side: Right  -AS Location: popliteal  -AS    Retired Wound - Properties Group Date first assessed: 03/30/22  -AS Time first assessed: 0342  -AS Present on Hospital Admission: Y  -AS Side: Right  -AS Location: popliteal  -AS       Wound 03/30/22 0348 Right anterior ankle    Wound - Properties Group Placement Date: 03/30/22  -AS Placement Time: 0348  -AS Present on Hospital Admission: Y  -AS Side: Right  -AS Orientation:  anterior  -AS Location: ankle  -AS    Retired Wound - Properties Group Placement Date: 03/30/22  -AS Placement Time: 0348  -AS Present on Hospital Admission: Y  -AS Side: Right  -AS Orientation: anterior  -AS Location: ankle  -AS    Retired Wound - Properties Group Date first assessed: 03/30/22  -AS Time first assessed: 0348  -AS Present on Hospital Admission: Y  -AS Side: Right  -AS Location: ankle  -AS       Wound 03/30/22 0349 Left antecubital    Wound - Properties Group Placement Date: 03/30/22  -AS Placement Time: 0349  -AS Present on Hospital Admission: Y  -AS Side: Left  -AS Location: antecubital  -AS    Retired Wound - Properties Group Placement Date: 03/30/22  -AS Placement Time: 0349  -AS Present on Hospital Admission: Y  -AS Side: Left  -AS Location: antecubital  -AS    Retired Wound - Properties Group Date first assessed: 03/30/22  -AS Time first assessed: 0349  -AS Present on Hospital Admission: Y  -AS Side: Left  -AS Location: antecubital  -AS       Wound 04/09/22 1201 Right posterior elbow Skin Tear    Wound - Properties Group Placement Date: 04/09/22  -AD Placement Time: 1201  -AD Present on Hospital Admission: N  -AD Side: Right  -AD Orientation: posterior  -AD Location: elbow  -AD Primary Wound Type: Skin tear  -AD    Retired Wound - Properties Group Placement Date: 04/09/22  -AD Placement Time: 1201  -AD Present on Hospital Admission: N  -AD Side: Right  -AD Orientation: posterior  -AD Location: elbow  -AD Primary Wound Type: Skin tear  -AD    Retired Wound - Properties Group Date first assessed: 04/09/22  -AD Time first assessed: 1201  -AD Present on Hospital Admission: N  -AD Side: Right  -AD Location: elbow  -AD Primary Wound Type: Skin tear  -AD          User Key  (r) = Recorded By, (t) = Taken By, (c) = Cosigned By    Initials Name Provider Type    Ariela Jimenez RN Registered Nurse    Zainab Griffiths RN Registered Nurse    Ashia Castaneda RN Registered Nurse    CAMERON Cano,  Guy ALEJANDRO LPN Licensed Nurse    Radha Quispe RN Registered Nurse    AS Maricruz Mi, RN Registered Nurse    Christine Shaffer LPN Licensed Nurse    Noy Torres, RN Registered Nurse                     Assessment/Plan    71-year-old gentleman with fluid overload and bacteremia     Active Hospital Problems:          Active Hospital Problems     Diagnosis     • **Fluid overload     • Chronic respiratory failure with hypoxia, on home oxygen therapy (HCC)     • Elevated troponin     • Hypothyroidism (acquired)     • End stage renal disease (HCC)     • Anemia of renal disease     • Mixed hyperlipidemia     • Major depressive disorder, recurrent, mild (HCC)     • Flaccid hemiplegia of right dominant side as late effect of cerebral infarction (HCC)     • COPD with acute exacerbation ([facility])     • S/P CABG (coronary artery bypass graft)     • Essential hypertension     • ANNETTE treated with BiPAP        Plan:      Shortness of breath secondary to fluid overload  Chronic respiratory failure with oxygen dependence    -Improving.  -CXR: small right greater than left pleural effusions.  Bibasilar airspace disease, right greater than left, favored to represent atelectasis.   -proBNP >70,000 (previous proBNP march 2022 was also >70,000)  -pt on his home rate of 3L O2 via NC  -pt did not complete dialysis  -nephrology consulted appreciate assistance     Possible strep bovis bacteremia.  ID is following.  IV antibiotics ordered  Pending culture of dialysis catheter  Pending final identification  Echo pending  Appreciate ID assistance        Chronically elevated troponin  -troponin 0.33 compared to troponin 0.146 on 3/12/2022.   -Will trend      ESRD on HD MWF  -on midodrine for BP support  -nephrology to manage     CVA with right sided residual weakness  -on eliquis, statin     CAD s/p CABG, PCI  Chronic atrial fibrillation  Hyperlipidemia  -chronic, controlled.   -continue Eliquis, atorvastatin, and  metoprolol     DM type 2  -hemoglobin A1c 6.3 on 3/12/2022 and previous admission basal insulin stopped due to hypoglycemia  -SSI AC/HS     Hypothyroidism  -Continue home synthroid     Major depressive disorder, recurrent, mild   Dementia  -Continue home zoloft, aricept     ANNETTE  -cpap qhs      Obesity (BMI 30-39.9)  BMI 36.92  -Lifestyle modifications to reduce cardiovascular risk factors     Dysphagia  Patient complaining about his current diet.  Speech following.     DVT prophylaxis: eliquis      CODE STATUS:    Admission Status:  I believe this patient meets observation status.     I discussed the patient's findings and my recommendations with patient.     This patient has been examined wearing appropriate Personal Protective Equipment.    05/10/22      Electronically signed by Jeff Black MD, FACP, 05/10/22, 17:53 EDT.      [facility] Hospitalist Team

## 2022-05-11 PROBLEM — E55.9 VITAMIN D DEFICIENCY, UNSPECIFIED: Status: ACTIVE | Noted: 2022-01-01

## 2022-05-11 PROBLEM — E46 UNSPECIFIED PROTEIN-CALORIE MALNUTRITION (HCC): Status: ACTIVE | Noted: 2022-01-01

## 2022-05-11 PROBLEM — I50.22 CHRONIC SYSTOLIC (CONGESTIVE) HEART FAILURE (HCC): Status: ACTIVE | Noted: 2022-01-01

## 2022-05-11 PROBLEM — E11.9 TYPE 2 DIABETES MELLITUS WITHOUT COMPLICATIONS (HCC): Status: ACTIVE | Noted: 2022-01-01

## 2022-05-11 PROBLEM — D51.9 VITAMIN B12 DEFICIENCY ANEMIA, UNSPECIFIED: Status: ACTIVE | Noted: 2022-01-01

## 2022-05-11 PROBLEM — I46.9 CARDIOPULMONARY ARREST WITH SUCCESSFUL RESUSCITATION (HCC): Status: ACTIVE | Noted: 2022-01-01

## 2022-05-11 PROBLEM — D63.1 ANEMIA IN CHRONIC KIDNEY DISEASE: Status: ACTIVE | Noted: 2022-01-01

## 2022-05-11 PROBLEM — E03.8 OTHER SPECIFIED HYPOTHYROIDISM: Status: ACTIVE | Noted: 2022-01-01

## 2022-05-11 PROBLEM — D50.9 IRON DEFICIENCY ANEMIA: Status: ACTIVE | Noted: 2022-01-01

## 2022-05-11 PROBLEM — I49.01 VENTRICULAR FIBRILLATION (HCC): Status: ACTIVE | Noted: 2022-01-01

## 2022-05-11 PROBLEM — I10 ESSENTIAL (PRIMARY) HYPERTENSION: Status: ACTIVE | Noted: 2022-01-01

## 2022-05-11 PROBLEM — F32.A DEPRESSION, UNSPECIFIED: Status: ACTIVE | Noted: 2022-01-01

## 2022-05-11 PROBLEM — N18.9 ANEMIA IN CHRONIC KIDNEY DISEASE: Status: ACTIVE | Noted: 2022-01-01

## 2022-05-11 PROBLEM — I48.91 ATRIAL FIBRILLATION (HCC): Status: ACTIVE | Noted: 2022-01-01

## 2022-05-11 PROBLEM — G81.01 FLACCID HEMIPLEGIA AFFECTING RIGHT DOMINANT SIDE (HCC): Status: ACTIVE | Noted: 2020-12-01

## 2022-05-11 PROBLEM — I67.9 CEREBROVASCULAR DISEASE, UNSPECIFIED: Status: ACTIVE | Noted: 2022-01-01

## 2022-05-11 PROBLEM — E13.40 OTHER SPECIFIED DIABETES MELLITUS WITH DIABETIC NEUROPATHY, UNSPECIFIED (HCC): Status: ACTIVE | Noted: 2022-01-01

## 2022-05-11 PROBLEM — I48.91 UNSPECIFIED ATRIAL FIBRILLATION (HCC): Status: ACTIVE | Noted: 2022-01-01

## 2022-05-11 PROBLEM — Z23 ENCOUNTER FOR IMMUNIZATION: Status: ACTIVE | Noted: 2021-01-01

## 2022-05-11 PROBLEM — F03.90 UNSPECIFIED DEMENTIA WITHOUT BEHAVIORAL DISTURBANCE: Status: ACTIVE | Noted: 2022-01-01

## 2022-05-11 PROBLEM — I50.89 OTHER HEART FAILURE (HCC): Status: ACTIVE | Noted: 2022-01-01

## 2022-05-11 PROBLEM — T82.212A: Status: ACTIVE | Noted: 2022-01-01

## 2022-05-11 NOTE — PROGRESS NOTES
RENAL/KCC:    Name: Benson Mclean  Age: 71 y.o.  : 1950  Sex: male    22    Subjective  S/P CODE overnight, moved to ICU.  On Amio.       Objective:    Vital Signs  Temp:  [97.1 °F (36.2 °C)-97.4 °F (36.3 °C)] 97.1 °F (36.2 °C)  Heart Rate:  [] 75  Resp:  [16-20] 16  BP: (103-148)/() 148/112        Intake/Output Summary (Last 24 hours) at 2022 0529  Last data filed at 2022 0039  Gross per 24 hour   Intake 180 ml   Output --   Net 180 ml           Physical Exam  Physical Exam  Constitutional:       General: He is not in acute distress.     Appearance: He is not diaphoretic.   HENT:      Head: Normocephalic and atraumatic.      Nose: Nose normal.   Eyes:      Pupils: Pupils are equal, round, and reactive to light.   Neck:      Thyroid: No thyromegaly.      Vascular: No JVD.      Trachea: No tracheal deviation.   Cardiovascular:      Rate and Rhythm: Normal rate and regular rhythm.      Heart sounds: No murmur heard.    No friction rub. No gallop.   Pulmonary:      Effort: Pulmonary effort is normal. No respiratory distress.      Breath sounds: No stridor. No wheezing or rales.   Chest:      Chest wall: No tenderness.   Abdominal:      General: Bowel sounds are normal.      Palpations: Abdomen is soft. There is no mass.      Tenderness: There is no abdominal tenderness. There is no guarding or rebound.      Hernia: No hernia is present.   Musculoskeletal:         General: No tenderness or deformity. Normal range of motion.      Cervical back: Normal range of motion and neck supple.   Skin:     General: Skin is warm and dry.      Coloration: Skin is not pale.      Findings: No rash.   Neurological:      Mental Status: He is alert and oriented to person, place, and time.      Cranial Nerves: No cranial nerve deficit.      Motor: No abnormal muscle tone.      Coordination: Coordination normal.      Deep Tendon Reflexes: Reflexes are normal and symmetric. Reflexes normal.    Psychiatric:         Behavior: Behavior normal.         Thought Content: Thought content normal.         Judgment: Judgment normal.            Results Review:      Results from last 7 days   Lab Units 05/10/22  0706 05/09/22 0228 05/08/22 0446 05/07/22 0417 05/06/22  1045   SODIUM mmol/L 135* 130* 133* 134* 138   CO2 mmol/L 24.0 24.0 25.0 26.0 30.0*   BUN mg/dL 17 24* 19 12 15   CREATININE mg/dL 2.86* 3.39* 2.94* 2.24* 2.74*   CALCIUM mg/dL 8.7 7.9* 8.1* 8.1* 8.1*   ALBUMIN g/dL 2.90*  --  2.60*  --  3.10*   AST (SGOT) U/L 12  --  12  --  11   ALT (SGPT) U/L 6  --  5  --  5   EGFR mL/min/1.73 22.8* 18.6* 22.1* 30.6* 24.0*       Results from last 7 days   Lab Units 05/10/22  0706 05/09/22  0528 05/08/22 0446 05/07/22 0417 05/06/22  1045   WBC 10*3/mm3 6.70 6.90 6.10 6.70 5.20   INR  1.09  --   --   --   --          Medication Review:   atorvastatin, 40 mg, Oral, Nightly  budesonide, 0.5 mg, Nebulization, BID  cefTRIAXone, 2 g, Intravenous, Q24H  donepezil, 10 mg, Oral, Nightly  insulin lispro, 0-7 Units, Subcutaneous, TID AC  ipratropium-albuterol, 3 mL, Nebulization, TID - RT  levothyroxine, 50 mcg, Oral, Q AM  metoprolol tartrate, 37.5 mg, Oral, BID  midazolam, , ,   midodrine, 10 mg, Oral, TID AC  pantoprazole, 40 mg, Oral, QAM  polyethylene glycol, 17 g, Oral, BID  potassium chloride, 40 mEq, Oral, Once  sertraline, 50 mg, Oral, Daily  sodium chloride, 10 mL, Intravenous, Q12H  sorbitol, 50 mL, Oral, Daily          Assessment:    End-stage renal disease    Bacteremia    S/P CODE    A-fib - on Amio    SOA    CHF    History of CVA    History of coronary artery disease    Diabetes      Plan:  F/U labs  HD MWF as tolerated  BP support  ABX per ID   Will follow      Yony Bhat MD  05/11/22  05:29 EDT  Tel: [phone]  Fax: [phone]

## 2022-05-11 NOTE — PROGRESS NOTES
"Nutrition Services    Patient Name: Benson Mclean  YOB: 1950  MRN: [id number]  Admission date: 5/6/2022      PPE Documentation        PPE Worn By Provider Did not enter room for this encounter   PPE Worn By Patient  N/A     PROGRESS NOTE      Encounter Information: 5/11: Progress note to check on nutrition plan of care. Pt remains on PO diet, though he has transitioned to the critical care environment since yesterday. Ate 75% at lunch meal today; 0% at previous meals. May benefit from oral supplement to help meet needs. Additionally, as K+ and phosphorus are both on low end, can remove these therapeutic restrictions at this time, until intake improves.       Labs (reviewed below): Hyponatremia - monitor, possibly R/t low solute intake  Hypokalemia - will liberalize diet   Phosphorus on low end but WNL - monitor        GI Function:  Stool Output  Stool Unmeasured Occurrence: 1 (05/10/22 1029)  Bowel Incontinence: Yes (05/10/22 1029)  Stool Amount: small (loose stool) (05/10/22 1029)          Nutrition Intervention: Start Boost Glucose Control BID (Provides 380 kcals, 32 g protein if consumed)          PO Diet/Supplements: Diet Cardiac, Diabetic/Consistent Carbs, Texture; Healthy Heart; Diabetic - Consistent Carb; Soft to Chew     Boost Glucose Control BID    EN Prescription: None       Intake/Output:   Intake/Output Summary (Last 24 hours) at 5/11/2022 1606  Last data filed at 5/11/2022 1355  Gross per 24 hour   Intake 420 ml   Output 1500 ml   Net -1080 ml            Height: Height: 177.8 cm (70\")   Weight: Weight: 104 kg (228 lb 8 oz) (05/11/22 0211)   BMI: Body mass index is 32.79 kg/m².     Results from last 7 days   Lab Units 05/11/22  0555 05/10/22  0706 05/09/22  0228 05/08/22  0446   SODIUM mmol/L 133* 135* 130* 133*   POTASSIUM mmol/L 3.2* 4.1 4.0 4.0   CHLORIDE mmol/L 97* 97* 93* 97*   CO2 mmol/L 24.0 24.0 24.0 25.0   BUN mg/dL 21 17 24* 19   CREATININE mg/dL 3.41* 2.86* 3.39* 2.94* "   CALCIUM mg/dL 8.0* 8.7 7.9* 8.1*   BILIRUBIN mg/dL 0.3 0.4  --  0.4   ALK PHOS U/L 46 56  --  52   ALT (SGPT) U/L 8 6  --  5   AST (SGOT) U/L 10 12  --  12   GLUCOSE mg/dL 167* 132* 133* 116*     Results from last 7 days   Lab Units 05/11/22  0555 05/09/22  0528 05/09/22  0228 05/08/22  0446   MAGNESIUM mg/dL 2.1  --  1.9 2.0   PHOSPHORUS mg/dL 2.7  --  2.2* 2.2*   HEMOGLOBIN g/dL 10.5*   < >  --  10.9*   HEMOGLOBIN, POC   --    < >  --   --    HEMATOCRIT % 34.9*   < >  --  35.8*   HEMATOCRIT POC   --    < >  --   --    TRIGLYCERIDES mg/dL 98  --   --   --     < > = values in this interval not displayed.     COVID19   Date Value Ref Range Status   05/10/2022 Not Detected Not Detected - Ref. Range Final     Lab Results   Component Value Date    HGBA1C 6.2 (H) 05/06/2022     Vitals:    05/11/22 1321   BP: (!) 108/39   Pulse: 62   Resp: 12   Temp: 97.3 °F (36.3 °C)   SpO2:      RD to follow up per protocol.    Electronically signed by:  Noy Albert RD  05/11/22 16:06 EDT

## 2022-05-11 NOTE — CONSULTS
Single lumen powerglide midline inserted in pt's VERN's basilic vein. Difficult access on first attempt, moved up basilic without incident. Pt tolerated procedure well. Dark red venous blood josesito back and flushed without incident. Pt's RN notified that midline is ready for use.

## 2022-05-11 NOTE — PROGRESS NOTES
"Palliative Care Social Work Progress Note    Code Status:full code    Goals of Care: Full Treatment    Narrative: Palliative care  met with pt to address goals of care.  Pt has been seen by PC team during previous admissions.  Pt was alert and oriented at time of contact.  Pt acknowledges coding yesterday, \"I don't even remember it now,\" reports pt.  Code status discussed and pt reports that in light of being able to resuscitated so easily yesterday he would want to continue to be a full code.  Pt reports no quality of life issues and feels he is improving.  Pt's goal is to eventually be able to live at home again with his wife.  Wife is also a patient at this hospital and was met with after contact with pt.  Wife reports that she fears the pt is \"giving up\" and that the pt's brother has been in contact with her to tell her to \"let him go\".  I informed her that the pt reports no intention of letting go at this time, and is improving.  She was surprised by this, and reports that she also feels that when the pt doesn't get his dialysis treatments that he is trying to communicate that he doesn't want to live anymore.  She is worried about his quality of life.  At present, pt was able to express his goals of care.  Should that change then wife's direction would be more helpful.  No other needs noted.  Emotional support provided.      Plan:  Will continue to follow.          FRANCIA Rodriguez    "

## 2022-05-11 NOTE — PROCEDURES
Insert Arterial Line    Date/Time: 5/11/2022 3:24 AM  Performed by: Danielle Garcia APRN  Authorized by: Danielle Garcia APRN   Consent: The procedure was performed in an emergent situation.  Required items: required blood products, implants, devices, and special equipment available  Patient identity confirmed: arm band and verbally with patient  Preparation: Patient was prepped and draped in the usual sterile fashion.  Indications: multiple ABGs and hemodynamic monitoring  Location: right radial    Sedation:  Patient sedated: no    Number of attempts: 1  Post-procedure: line sutured and dressing applied  Post-procedure CMS: normal  Patient tolerance: patient tolerated the procedure well with no immediate complications  Comments: Pulsatile blood flow noted. Appropriate waveform visualized.        Electronically signed by FABIOLA Boothe, 05/11/22, 3:26 AM EDT.

## 2022-05-11 NOTE — CASE MANAGEMENT/SOCIAL WORK
Case Management Readmission Assessment Note    Case Management Readmission Assessment (all recorded)     Readmission Interview     Row Name 05/11/22 1245             Readmission Indications    Is this hospitalization related to the prior hospital diagnosis? No      What was the reason you were admitted? 3/29/22-4/11/22 Dx: Acute CVA, Current Dx: Fluid Overload - unable to completed HD.      Row Name 05/11/22 1245             Recommendation for rehospitalization    Did you speak with your physician prior to coming to the hospital Yes  Renal doctor notified per Chelly at op Dialysis      Who recommended you return to the hospital? Specialist      Did you seek care elsewhere prior to coming to the hospital? No      Row Name 05/11/22 1245             Follow up appointment    Do you have a PCP? Yes  Dr. Rooney      Did you have an appointment with PCP/specialist after hospitalization within 7 days? --  unable to evaluate. ECF      Row Name 05/11/22 1245             Medications    Did you have newly prescribed medications at discharge? Yes  Apixiban and Duonebs      Did you understand the reasons for your medications at discharge and how to take them? --  OZIEL      Did you understand the side effects of your medications? --  OZIEL      Are you taking all of you prescribed medications? Yes  per MAR      Row Name 05/11/22 1245             Discharge Instructions    Did you understand your discharge instructions? --  OZIEL      Did your family/caregiver hear your instructions? No  Patient spouse states she was not present at discharge      Were you told to eat a special diet? Yes  Mechanical Soft Diet per AVS      Did you adhere to the diet? Yes  per facility      Were you given a number of someone to call if you had questions or concerns? Yes  per AVS      Row Name 05/11/22 1245             Index discharge location/services    Where did you go upon discharge? Other (comment)  [facility]      Do you have supportive family  or friends in the home? --  facility staff      Row Name 05/11/22 4605             Discharge Readiness    On a scale of 1-5 (5 being well prepared), how ready were you for discharge 3  Patient spouse states he had not yet gotten back to baseline before he was discharged back to [facility].      Recommendation based on interview Goals of care discussion/advanced care planning              Discharge Planning Assessment   [facility]     Patient Name: Benson Mclean  MRN: [id number]  Today's Date: 5/11/2022    Admit Date: 5/6/2022     Discharge Needs Assessment     Row Name 05/11/22 1250       Living Environment    People in Home other (see comments)  [facility]    Current Living Arrangements other (see comments);residential facility  [place]    Primary Care Provided by other (see comments)  facility staff    Provides Primary Care For no one, unable/limited ability to care for self    Family Caregiver if Needed none;other (see comments)  Spouse is involved in care Melanie. Lives at [facility]    Quality of Family Relationships supportive;involved;helpful    Able to Return to Prior Arrangements yes    Living Arrangement Comments Confirmed with spouse Melanie that she is agreeable for patient to return to [place]. Liaison accepted patient to return per previous CM.       Resource/Environmental Concerns    Resource/Environmental Concerns none    Transportation Concerns none       Transition Planning    Patient/Family Anticipates Transition to long-term care facility  [place]    Patient/Family Anticipated Services at Transition     Transportation Anticipated health plan transportation       Discharge Needs Assessment    Readmission Within the Last 30 Days current reason for admission unrelated to previous admission    Equipment Currently Used at Home cpap;oxygen;wheelchair  at facility    Concerns to be Addressed adjustment to diagnosis/illness;care coordination/care conferences;discharge planning;decision-making   Patient spouse is unsure of course of care at this time and would like to discuss care directives. Palliative consult placed.    Anticipated Changes Related to Illness none    Equipment Needed After Discharge none    Discharge Facility/Level of Care Needs nursing facility, skilled  Per liaison, patient will return to facility skilled.    Provided Post Acute Provider List? N/A    Provided Post Acute Provider Quality & Resource List? N/A    Patient's Choice of Community Agency(s) [place]    Current Discharge Risk chronically ill;cognitively impaired;dependent with mobility/activities of daily living;physical impairment               Discharge Plan     Row Name 05/11/22 1300       Plan    Plan DC Plan: Return to [facility]. Patient spouse Melanie agreeable to return. No precert needed. PASSR per facility. Patient Current HD with [facility].    Provided Post Acute Provider List? N/A    Provided Post Acute Provider Quality & Resource List? N/A    Plan Comments CM spoke with patient spouse Melanie to discuss admission assessment, readmission assessment, and discharge planning. Melanie confirms PCP and pharmacy per facility. Melanie denies any difficulty affording medications. Melanie confirms she is agreeable for patient to return to [place] when ready for discharge. Patient is eligible to return per previous CM notes. Patient spouse states she is unsure about progression of care after current events. Patient had a code blue on 5/10/22 in which he was defibrillated and ROSC returned. Patient did not require intubation. Melanie states family is suggesting it is time to let the patient go and stop doing dialysis. YOHANNES spoke with Melanie about assistance that palliative care team could provide in helping her develop a plan of care that she is comfortable with. Melanie states she would appreciate talking to them. YOHANNES notified nurse Sonia of Melanie's request for palliative and Sonia states she will inform NP of request. YOHANNES also  informed Sonia that Melanie would like to be taken to patients bedside if he should have a decline in condition. Melanie is also currently hospitalized in room 252. Sonia verbalized understanding. CM reached out to Palliative  Nathan Yeh and informed him of wife being hospitalized and room number for consult.CM will continue to follow for any additional needs that may develop and adjust discharge plan accordingly. DC Barriers: Amiodarone gtt, Recent V fib              Continued Care and Services - Admitted Since 5/6/2022     Destination     Service Provider Request Status Selected Services Address Phone Fax Patient Preferred    [facility]  Accepted N/A [address] [place] [address] [phone] --            Selected Continued Care - Prior Encounters Includes selections from prior encounters from 2/5/2022 to 5/11/2022    Discharged on 4/11/2022 Admission date: 3/29/2022 - Discharge disposition: Intermediate Care     Destination     Service Provider Selected Services Address Phone Fax Patient Preferred    [facility]  Nursing [place] [address] [place] [address] [phone] --                Discharged on 3/18/2022 Admission date: 3/12/2022 - Discharge disposition: Intermediate Care     Destination     Service Provider Selected Services Address Phone Fax Patient Preferred    [facility] [place]  Intermediate Care [address] [place] [address] [phone] --                Discharged on 3/10/2022 Admission date: 3/7/2022 - Discharge disposition: Rehab Facility or Unit (DC - External)    Destination     Service Provider Selected Services Address Phone Fax Patient Preferred    [facility] [place]  Skilled Nursing [address] [place] [address] [phone] --                       Demographic Summary     Row Name 05/11/22 1243       General  Information    Admission Type inpatient    Arrived From other (see comments)  [facility]    Referral Source admission list    Reason for Consult discharge planning    Preferred Language English       Contact Information    Permission Granted to Share Info With                Functional Status     Row Name 05/11/22 1244       Functional Status    Usual Activity Tolerance poor    Current Activity Tolerance poor       Functional Status, IADL    Medications completely dependent    Meal Preparation completely dependent    Housekeeping completely dependent    Laundry completely dependent    Shopping completely dependent       Mental Status Summary    Recent Changes in Mental Status/Cognitive Functioning no changes       Employment/    Employment Status retired    Current or Previous Occupation not applicable              Phone communication or documentation only- no physical contact with patient or family.      Jessica Parker, RN     Office Phone: [phone]  Office Cell:     [phone]

## 2022-05-11 NOTE — SIGNIFICANT NOTE
Information obtained from patient spouse Melanie via telephone secondary to mild orientation deficit for patient. Spouse states she is currenty hospitalized as well for CHF

## 2022-05-11 NOTE — SIGNIFICANT NOTE
Code blue was called for patient. Per the patient primary RN, central monitoring called stating that the patient was in V-fib. Upon entering the room the RN stated that the patient was unconscious and blue. CPR was started. Upon arrival of code team the patient was defibrillated x1.  Patient was given an epi x1, bicarb x2. Upon pulse checks patient was found to be with a pulse and in normal sinus with a left bundle. Patient was moaning and turning his head. He became more and more alert.  Patient did not require intubation.  He continued to oxygenate well with nasal cannula. Within about 5 minutes the patient was asking to sit up and asking for water.  Per patient's primary RN the patient was back to baseline. Upon chart review the patient has an EF of 20%. Cardiology was called and updated by primary RN.  Amnio was ordered.  Labs were ordered to check electrolytes. Patient transferred to ICU for further work-up and monitoring.      Electronically signed by FABIOLA Boothe, 05/11/22, 5:35 AM EDT.

## 2022-05-11 NOTE — PROGRESS NOTES
Infectious Diseases Progress Note      LOS: 2 days   Patient Care Team:  Rudolph Rooney MD as PCP - General (Family Medicine)  Samantha Zabala APRN as PCP - Family Medicine  Jose G Rm MD as Consulting Physician (Cardiology)    Chief Complaint: Shortness of breath    Subjective       The patient has been afebrile for the last 24 hours.  The patient is on 3 L of oxygen by nasal cannula, hemodynamically stable, and is tolerating antimicrobial therapy.  The patient sustained cardiac arrest with V. fib last night he was resuscitated successfully.  He was transferred to ICU.  He is currently stable      Review of Systems:   Review of Systems   Constitutional: Positive for fatigue.   HENT: Negative.    Eyes: Negative.    Respiratory: Positive for shortness of breath.    Cardiovascular: Negative.    Gastrointestinal: Negative.    Endocrine: Negative.    Genitourinary: Negative.    Musculoskeletal: Negative.    Skin: Negative.    Neurological: Negative.    Psychiatric/Behavioral: Negative.    All other systems reviewed and are negative.       Objective     Vital Signs  Temp:  [97.1 °F (36.2 °C)-97.5 °F (36.4 °C)] 97.5 °F (36.4 °C)  Heart Rate:  [] 62  Resp:  [16-20] 16  BP: ()/() 82/42  Arterial Line BP: ()/(28-44) 109/42    Physical Exam:  Physical Exam  Vitals and nursing note reviewed.   Constitutional:       General: He is not in acute distress.     Appearance: Normal appearance. He is well-developed and normal weight. He is not diaphoretic.   HENT:      Head: Normocephalic and atraumatic.   Eyes:      Conjunctiva/sclera: Conjunctivae normal.      Pupils: Pupils are equal, round, and reactive to light.   Cardiovascular:      Rate and Rhythm: Normal rate and regular rhythm.      Heart sounds: Normal heart sounds, S1 normal and S2 normal.   Pulmonary:      Effort: Pulmonary effort is normal. No respiratory distress.      Breath sounds: No stridor. Rales present. No wheezing.   Abdominal:       General: Bowel sounds are normal. There is no distension.      Palpations: Abdomen is soft. There is no mass.      Tenderness: There is no abdominal tenderness. There is no guarding.   Musculoskeletal:         General: No deformity. Normal range of motion.      Cervical back: Neck supple.   Skin:     General: Skin is warm and dry.      Coloration: Skin is not pale.      Findings: No erythema or rash.   Neurological:      Mental Status: He is alert and oriented to person, place, and time.      Cranial Nerves: No cranial nerve deficit.   Psychiatric:         Mood and Affect: Mood normal.          Results Review:    I have reviewed all clinical data, test, lab, and imaging results.     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  FINDINGS: Interval placement of a left IJ central venous line with the tip in the cavoatrial junction region. No pneumothorax. Right IJ dialysis catheter remains in place. Cardiomegaly with coronary artery stent again demonstrated. Haziness of the lungs with indistinct vessels and right larger than left pleural effusions, not significantly changed       1. No pneumothorax status post central venous line placement 2. Stable appearance of pulmonary edema and bilateral pleural effusions  Electronically Signed By-Norman Paige On:5/11/2022 7:39 AM This report was finalized on [id number] by  Norman Paige, .    XR Chest 1  View    Result Date: 5/11/2022  Chest one view. DATE: 5/11/2022. COMPARISON: 5/6/2022. CLINICAL HISTORY: Status post code.     Midline sternotomy wires are present. Right dual-lumen central venous catheter is unchanged. The cardiac silhouette is moderately to significantly enlarged and there is worsening interstitial and airspace changes bilaterally which is likely related to a component of edema. There also appears to be moderate bilateral pleural effusions which are very similar to the previous examination. Superimposed infection would be difficult to exclude. Electronically signed by:  Sal Nieto D.O.  5/11/2022 12:08 AM      Cardiology    Laboratory    Results from last 7 days   Lab Units 05/11/22  0555 05/11/22  0253 05/10/22  0706 05/09/22 0528 05/08/22 0446 05/07/22 0417 05/06/22  1045   WBC 10*3/mm3 11.20*  --  6.70 6.90 6.10 6.70 5.20   HEMOGLOBIN g/dL 10.5*  --  11.9* 10.8* 10.9* 10.9* 11.0*   HEMOGLOBIN, POC g/dL  --  13.4  --   --   --   --   --    HEMATOCRIT % 34.9*  --  39.4 34.6* 35.8* 35.7* 35.8*   HEMATOCRIT POC %  --  39  --   --   --   --   --    PLATELETS 10*3/mm3 234  --  213 200 173 163 203     Results from last 7 days   Lab Units 05/11/22  0555 05/10/22  0706 05/09/22  0228 05/08/22 0446 05/07/22 0417 05/06/22  1045   SODIUM mmol/L 133* 135* 130* 133* 134* 138   POTASSIUM mmol/L 3.2* 4.1 4.0 4.0 3.9 3.8   CHLORIDE mmol/L 97* 97* 93* 97* 97* 95*   CO2 mmol/L 24.0 24.0 24.0 25.0 26.0 30.0*   BUN mg/dL 21 17 24* 19 12 15   CREATININE mg/dL 3.41* 2.86* 3.39* 2.94* 2.24* 2.74*   GLUCOSE mg/dL 167* 132* 133* 116* 164* 99   ALBUMIN g/dL 2.80* 2.90*  --  2.60*  --  3.10*   BILIRUBIN mg/dL 0.3 0.4  --  0.4  --  0.7   ALK PHOS U/L 46 56  --  52  --  59   AST (SGOT) U/L 10 12  --  12  --  11   ALT (SGPT) U/L 8 6  --  5  --  5   CALCIUM mg/dL 8.0* 8.7 7.9* 8.1* 8.1* 8.1*                 Microbiology   Microbiology Results (last 10 days)     Procedure Component Value - Date/Time    MRSA Screen,  PCR (Inpatient) - Swab, Nares [[id number]]  (Normal) Collected: 05/11/22 0557    Lab Status: Final result Specimen: Swab from Nares Updated: 05/11/22 0804     MRSA PCR No MRSA Detected    COVID PRE-OP / PRE-PROCEDURE SCREENING ORDER (NO ISOLATION) - Swab, Nasopharynx [[id number]]  (Normal) Collected: 05/10/22 0635    Lab Status: Final result Specimen: Swab from Nasopharynx Updated: 05/10/22 1143    Narrative:      The following orders were created for panel order COVID PRE-OP / PRE-PROCEDURE SCREENING ORDER (NO ISOLATION) - Swab, Nasopharynx.  Procedure                               Abnormality         Status                     ---------                               -----------         ------                     COVID-19,CEPHEID/ROHAN,CO...[[id number]]  Normal              Final result                 Please view results for these tests on the individual orders.    COVID-19,CEPHEID/ROHAN,COR/ZEYNEP/PAD/BEATRICE IN-HOUSE(OR EMERGENT/ADD-ON),NP SWAB IN TRANSPORT MEDIA 3-4 HR TAT, RT-PCR - Swab, Nasopharynx [[id number]]  (Normal) Collected: 05/10/22 0635    Lab Status: Final result Specimen: Swab from Nasopharynx Updated: 05/10/22 1143     COVID19 Not Detected    Narrative:      Fact sheet for providers: https://www.fda.gov/media/332975/download     Fact sheet for patients: https://www.fda.gov/media/405250/download  Fact sheet for providers: https://www.fda.gov/media/766856/download     Fact sheet for patients: https://www.fda.gov/media/036203/download    Blood Culture - Blood, Hand, Right [[id number]]  (Normal) Collected: 05/07/22 1519    Lab Status: Preliminary result Specimen: Blood from Hand, Right Updated: 05/10/22 1617     Blood Culture No growth at 3 days    CANDIDA AURIS SCREEN - Swab, Axilla Right, Axilla Left and Groin [[id number]]  (Normal) Collected: 05/07/22 1020    Lab Status: Preliminary result Specimen: Swab from Axilla Right, Axilla Left and Groin Updated: 05/11/22 1032     Candida Auris Screen Culture No Candida  auris isolated at 4 days    COVID PRE-OP / PRE-PROCEDURE SCREENING ORDER (NO ISOLATION) - Swab, Nasopharynx [[id number]]  (Normal) Collected: 05/06/22 1519    Lab Status: Final result Specimen: Swab from Nasopharynx Updated: 05/06/22 1542    Narrative:      The following orders were created for panel order COVID PRE-OP / PRE-PROCEDURE SCREENING ORDER (NO ISOLATION) - Swab, Nasopharynx.  Procedure                               Abnormality         Status                     ---------                               -----------         ------                     COVID-19,CEPHEID/ROHAN,CO...[[id number]]  Normal              Final result                 Please view results for these tests on the individual orders.    COVID-19,CEPHEID/ROHAN,COR/ZEYNEP/PAD/BEATRICE IN-HOUSE(OR EMERGENT/ADD-ON),NP SWAB IN TRANSPORT MEDIA 3-4 HR TAT, RT-PCR - Swab, Nasopharynx [[id number]]  (Normal) Collected: 05/06/22 1519    Lab Status: Final result Specimen: Swab from Nasopharynx Updated: 05/06/22 1542     COVID19 Not Detected    Narrative:      Fact sheet for providers: https://www.fda.gov/media/464012/download     Fact sheet for patients: https://www.fda.gov/media/159739/download  Fact sheet for providers: https://www.fda.gov/media/545545/download    Fact sheet for patients: https://www.fda.gov/media/500318/download    Test performed by PCR.    Blood Culture - Blood, Hand, Right [[id number]]  (Abnormal)  (Susceptibility) Collected: 05/06/22 1116    Lab Status: Final result Specimen: Blood from Hand, Right Updated: 05/09/22 0703     Blood Culture Streptococcus gallolyticus ssp pasteurianus     Isolated from Aerobic and Anaerobic Bottles     Gram Stain Anaerobic Bottle Gram positive cocci      Aerobic Bottle Gram positive cocci    Susceptibility      Streptococcus gallolyticus ssp pasteurianus      MONROE      Ceftriaxone Susceptible      Penicillin G Susceptible      Vancomycin Susceptible                           Blood Culture ID, PCR - Blood, Hand,  Right [[id number]]  (Abnormal) Collected: 05/06/22 1116    Lab Status: Final result Specimen: Blood from Hand, Right Updated: 05/07/22 0216     BCID, PCR Streptococcus spp, not A, B, or pneumonia. Identification by BCID2 PCR.     BOTTLE TYPE Anaerobic Bottle    Blood Culture - Blood, Arm, Right [[id number]]  (Abnormal) Collected: 05/06/22 1057    Lab Status: Final result Specimen: Blood from Arm, Right Updated: 05/09/22 0704     Blood Culture Streptococcus gallolyticus ssp pasteurianus     Isolated from Aerobic and Anaerobic Bottles     Gram Stain Anaerobic Bottle Gram positive cocci      Aerobic Bottle Gram positive cocci    Narrative:      Refer to previous blood culture collected on 5/6/2022 1116 for MICs.            Medication Review:       Schedule Meds  amiodarone, 200 mg, Oral, Q24H  atorvastatin, 40 mg, Oral, Nightly  budesonide, 0.5 mg, Nebulization, BID  cefTRIAXone, 2 g, Intravenous, Q24H  donepezil, 10 mg, Oral, Nightly  enoxaparin, 1 mg/kg, Subcutaneous, Q24H  insulin lispro, 0-7 Units, Subcutaneous, TID AC  ipratropium-albuterol, 3 mL, Nebulization, TID - RT  levothyroxine, 50 mcg, Oral, Q AM  metoprolol tartrate, 25 mg, Oral, BID  midazolam, , ,   midodrine, 15 mg, Oral, TID AC  pantoprazole, 40 mg, Oral, QAM  polyethylene glycol, 17 g, Oral, BID  sertraline, 50 mg, Oral, Daily  sodium chloride, 10 mL, Intravenous, Q12H  sorbitol, 50 mL, Oral, Daily        Infusion Meds       PRN Meds  •  acetaminophen **OR** acetaminophen **OR** acetaminophen  •  albumin human  •  aluminum-magnesium hydroxide-simethicone  •  dextrose  •  dextrose  •  glucagon (human recombinant)  •  insulin lispro **AND** insulin lispro  •  ipratropium-albuterol  •  melatonin  •  Morphine  •  ondansetron **OR** ondansetron  •  oxyCODONE  •  sodium chloride  •  sodium chloride        Assessment/Plan       Antimicrobial Therapy   1.  IV ceftriaxone        2.        3.        4.        5.            Assessment     Bacteremia with  Streptococcus gallolyticus ssp pasteurianus (Streptococcus bovis biotype II).  We need to rule out endocarditis or GI malignancy  Colonoscopy was done on May 10, 2022 and found to have polyps and biopsies are pending     The patient presented hospital with shortness of breath and chest x-ray showed density at the right base.  Need to rule out pneumonia  -CT showed some large bilateral pleural effusions but no obvious pneumonia which most likely secondary to volume overload  -Patient is currently on 2 L of oxygen by cannula     End-stage renal disease on hemodialysis.  Patient had right chest tunneled dialysis catheter and left arm AV fistula     History of CVA    S/p cardiac arrest with V. fib rhythm resuscitated successfully on May 11, 2022     Plan     Continue ceftriaxone 2 g IV daily  I recommend transesophageal echo to rule out vegetation since patient had Streptococcus bovis bacteremia  Supportive care  A.m. labs        Radha Jack MD  05/11/22  12:36 EDT    Note is dictated utilizing voice recognition software/Dragon

## 2022-05-11 NOTE — NURSING NOTE
Code call on pt, transfer pt to ICU, report given to nsg, pt spouse notified of transfer, spouse request to contact pt brother, attempt to call brother , no answer message left with new room number and [facility] nurse station number

## 2022-05-11 NOTE — CONSULTS
Group: [facility]         CONSULT NOTE    Patient Identification:  Benson Mclean  71 y.o.  male  1950  [id number]            Requesting physician: Attending physician        History of Present Illness:    71 y.o. male with multiple medical issues including ESRD on HD Zlirsz-Gcnfymemh-Kkrzog, chronic systolic congestive heart failure, CAD s/p CABG, cardiac stent, chronic atrial fibrillation, CVA with residual right-sided weakness, COPD on chronic oxygen 3L O2 via NC, ANNETTE on Cpap, previous PE,  HTN, HLD, type 2 diabetes mellitus complicated by neuropathy, anemia of renal disease, vitamin deficiencies, and dementia     S/p Code blue was in V-fib. was defibrillated x1.  Patient was given an epi x1, bicarb x2. Upon pulse checks patient was found to be with a pulse and in normal sinus with a left bundle. Patient was moaning and turning his head. He became more and more alert.  Patient did not require intubation.      He continued to oxygenate well with nasal cannula. Within about 5 minutes the patient was asking to sit up and asking for water.  Per patient's primary RN the patient was back to baseline.     EF of 20%. Cardiology was called and  Amio was ordered.           Assessment:    S/p Code blue was in V-fib. was defibrillated x1.  Patient was given an epi x1, bicarb x2. Upon pulse checks patient was found to be with a pulse and in normal sinus with a left bundle. Patient was moaning and turning his head. He became more and more alert.  Patient did not require intubation.      He continued to oxygenate well with nasal cannula. Within about 5 minutes the patient was asking to sit up and asking for water    Bacteremia with Streptococcus gallolyticus previously called Streptococcus bovis could be associated with GI malignancy    ESRD on HD Ophsff-Ztnvjgwbl-Emqjpe,   chronic systolic congestive heart failure,   CAD s/p CABG, cardiac stent,   chronic atrial fibrillation,   CVA with residual  right-sided weakness,   COPD on chronic oxygen 3L O2 via NC,   ANNETTE on Cpap,   previous PE,    HTN,   HLD,   type 2 diabetes mellitus complicated by neuropathy,   anemia of renal disease,   vitamin deficiencies,   dementia       Recommendations:    Amiodarone drip however due to low heart rate and borderline blood pressure may DC and start p.o. amiodarone  MARSHA to rule out endocarditis due to Streptococcus gallolyticus bacteremia  Colonoscopy completed, may need CT abdomen  Antibiotics currently on Rocephin  Hemodialysis    BiPAP        Review of Sytems:  Review of Systems   Respiratory: Positive for cough and shortness of breath.    Cardiovascular: Positive for palpitations and leg swelling.       Past Medical History:  Past Medical History:   Diagnosis Date   • A-fib ([facility]) 8/3/2021   • Anemia    • Appetite loss    • Arthritis    • Brain bleed ([facility])    • Carpal tunnel syndrome on left    • CHF (congestive heart failure) ([facility])    • Chronic left-sided low back pain without sciatica 12/27/2017   • CKD (chronic kidney disease) stage 3, GFR 30-59 ml/min ([facility])    • Closed fracture of seventh thoracic vertebra ([facility]) 8/23/2020   • Cognitive communication deficit 12/1/2020   • COPD (chronic obstructive pulmonary disease) ([facility])    • Coronary artery disease    • COVID-19 2/19/2021   • Cytokine release syndrome, grade 1 5/24/2021   • Depression    • Diabetic neuropathy ([facility])    • Dialysis patient ([facility])     mon wed fri   • DM2 (diabetes mellitus, type 2) ([facility])    • GERD (gastroesophageal reflux disease)    • Hyperlipidemia    • Hypertension    • Hypogonadism in male    • Neuropathy    • Nonsustained ventricular tachycardia ([facility]) 7/23/2021   • Obesity    • Paroxysmal atrial fibrillation ([facility]) 12/1/2020   • Sleep apnea    • Stroke ([facility])    • Unsteady gait        Past Surgical History:  Past Surgical History:   Procedure Laterality Date   • ANGIOPLASTY      X2   • APPENDECTOMY     • ARTERIOVENOUS FISTULA/SHUNT SURGERY Left 1/20/2022     Procedure: LEFT BRACHIAL CEPHALIC ARTERIAL VENOUS FISTULA CREATION;  Surgeon: Yony Davila MD;  Location: [facility] MAIN OR;  Service: Vascular;  Laterality: Left;   • CARDIAC CATHETERIZATION  11/13/2015   • CARDIAC CATHETERIZATION N/A 5/24/2021    Procedure: Left Heart Cath and coronary angiogram;  Surgeon: Jose G Rm MD;  Location:  [facility] CATH INVASIVE LOCATION;  Service: Cardiovascular;  Laterality: N/A;   • CARDIAC CATHETERIZATION  5/24/2021    Procedure: Saphenous Vein Graft;  Surgeon: Jose G Rm MD;  Location:  [facility] CATH INVASIVE LOCATION;  Service: Cardiovascular;;   • CARDIAC CATHETERIZATION N/A 5/24/2021    Procedure: Percutaneous Coronary Intervention;  Surgeon: Bernabe Zambrano MD;  Location: [facility] CATH INVASIVE LOCATION;  Service: Cardiology;  Laterality: N/A;   • CARDIAC CATHETERIZATION N/A 5/24/2021    Procedure: Stent NIELS coronary;  Surgeon: Bernabe Zambrano MD;  Location: [facility] CATH INVASIVE LOCATION;  Service: Cardiology;  Laterality: N/A;   • CARDIAC CATHETERIZATION N/A 5/26/2021    Procedure: Percutaneous Coronary Intervention;  Surgeon: Bernabe Zambrano MD;  Location:  [facility] CATH INVASIVE LOCATION;  Service: Cardiovascular;  Laterality: N/A;   • CARDIAC CATHETERIZATION N/A 5/26/2021    Procedure: Stent NIELS bypass graft;  Surgeon: Bernabe Zambrano MD;  Location: [facility] CATH INVASIVE LOCATION;  Service: Cardiovascular;  Laterality: N/A;   • CARDIAC ELECTROPHYSIOLOGY PROCEDURE N/A 5/24/2021    Procedure: Temporary Pacemaker;  Surgeon: Bernabe Zambrano MD;  Location: [facility] CATH INVASIVE LOCATION;  Service: Cardiology;  Laterality: N/A;   • CARDIAC ELECTROPHYSIOLOGY PROCEDURE N/A 5/26/2021    Procedure: Temporary Pacemaker;  Surgeon: Bernabe Zambrano MD;  Location: [facility] CATH INVASIVE LOCATION;  Service: Cardiovascular;  Laterality: N/A;   • CARPAL TUNNEL RELEASE Left 04/29/2018    carpal tunnel- lt hand// other hand surgeries    • CATARACT EXTRACTION, BILATERAL  2002    Dr. Lux Acosta    • CHOLECYSTECTOMY     • COLON RESECTION  2005   • CORONARY ANGIOPLASTY     • CORONARY ANGIOPLASTY WITH STENT PLACEMENT  11/13/2015    PTCA stent to proximal in stent and mid to distal lad   • CORONARY ANGIOPLASTY WITH STENT PLACEMENT  09/16/2016    PTCA stent to mid lad and stent to vein graft to marginal   • CORONARY ARTERY BYPASS GRAFT  2005    @ [facility]   • CYST REMOVAL      cyst removed from scrotum   • FOOT SURGERY Right 07/17/2018   • FOOT SURGERY Left 02/04/2019   • PORTACATH PLACEMENT     • TOE AMPUTATION Right     right toe removed D/T infected cut that went to the bone        Home Meds:  Medications Prior to Admission   Medication Sig Dispense Refill Last Dose   • albuterol sulfate  (90 Base) MCG/ACT inhaler Inhale 2 puffs Every 4 (Four) Hours.   5/6/2022 at Unknown time   • apixaban (ELIQUIS) 5 MG tablet tablet Take 1 tablet by mouth Every 12 (Twelve) Hours. Indications: Atrial Fibrillation 60 tablet  5/6/2022 at Unknown time   • atorvastatin (LIPITOR) 40 MG tablet Take 40 mg by mouth Every Night.   5/5/2022 at Unknown time   • budesonide (Pulmicort) 0.5 MG/2ML nebulizer solution Take 2 mL by nebulization 2 (Two) Times a Day.   5/6/2022 at Unknown time   • cholecalciferol (VITAMIN D3) 25 MCG (1000 UT) tablet Take 1,000 Units by mouth Daily.   5/6/2022 at Unknown time   • docusate sodium (COLACE) 100 MG capsule Take 100 mg by mouth Daily.   5/6/2022 at Unknown time   • donepezil (ARICEPT) 10 MG tablet Take 10 mg by mouth Every Night.   5/5/2022 at Unknown time   • fluticasone (FLONASE) 50 MCG/ACT nasal spray 2 sprays by Each Nare route 2 (Two) Times a Day.   5/6/2022 at Unknown time   • guaiFENesin (MUCINEX) 600 MG 12 hr tablet Take 600 mg by mouth 2 (Two) Times a Day.   5/6/2022 at Unknown time   • HYDROcodone-acetaminophen (NORCO) 5-325 MG per tablet Take 1 tablet by mouth 3 (Three) Times a Day.   5/6/2022 at Unknown time   • ipratropium-albuterol (DUO-NEB) 0.5-2.5 mg/3 ml nebulizer Take 3 mL by  nebulization 3 (Three) Times a Day. 360 mL  5/6/2022 at Unknown time   • ipratropium-albuterol (DUO-NEB) 0.5-2.5 mg/3 ml nebulizer Take 3 mL by nebulization Every 4 (Four) Hours As Needed for Wheezing. 360 mL  5/6/2022 at Unknown time   • levothyroxine (SYNTHROID, LEVOTHROID) 50 MCG tablet Take 50 mcg by mouth Every Morning.   5/6/2022 at Unknown time   • Metoprolol Tartrate 37.5 MG tablet Take 37.5 mg by mouth 2 (Two) Times a Day. Hold for SBP <110 or HR < 60   5/6/2022 at Unknown time   • midodrine (PROAMATINE) 10 MG tablet Take 10 mg by mouth 3 (Three) Times a Day Before Meals. Hold for BP > 140/90   5/6/2022 at Unknown time   • omeprazole (priLOSEC) 20 MG capsule Take 20 mg by mouth Daily.   5/6/2022 at Unknown time   • polyethylene glycol (MiraLax) 17 g packet Take 17 g by mouth 2 (Two) Times a Day.   5/6/2022 at Unknown time   • sertraline (ZOLOFT) 50 MG tablet Take 50 mg by mouth Daily.   5/6/2022 at Unknown time   • vitamin B-12 (CYANOCOBALAMIN) 1000 MCG tablet Take 1,000 mcg by mouth Daily.   5/6/2022 at Unknown time   • bisacodyl (DULCOLAX) 10 MG suppository Insert 10 mg into the rectum Daily As Needed for Constipation.   Unknown at Unknown time   • magnesium hydroxide (MILK OF MAGNESIA) 400 MG/5ML suspension Take 30 mL by mouth Daily As Needed for Constipation.   Unknown at Unknown time   • ondansetron (ZOFRAN) 4 MG tablet Take 4 mg by mouth Every 8 (Eight) Hours As Needed for Nausea or Vomiting.   Unknown at Unknown time   • phenylephrine-mineral oil-petrolatum (Preparation H) 0.25-14-74.9 % ointment hemorrhoidal ointment Insert 1 application into the rectum Daily As Needed.   Unknown at Unknown time       Allergies:  Allergies   Allergen Reactions   • Codeine Itching       Social History:   Social History     Socioeconomic History   • Marital status:    Tobacco Use   • Smoking status: Former Smoker     Packs/day: 1.00     Years: 60.00     Pack years: 60.00     Types: Cigarettes   • Smokeless  "tobacco: Never Used   • Tobacco comment: Patient quit smoking 11/2020   Vaping Use   • Vaping Use: Never used   Substance and Sexual Activity   • Alcohol use: No   • Drug use: Yes     Frequency: 1.0 times per week     Types: Marijuana     Comment: socially   • Sexual activity: Defer       Family History:  Family History   Problem Relation Age of Onset   • Heart disease Mother    • Heart disease Father    • Diabetes Sister    • Heart disease Sister    • Diabetes Brother    • Mental illness Brother        Physical Exam:  BP (!) 82/42   Pulse 63   Temp (P) 97.4 °F (36.3 °C) (Oral)   Resp 16   Ht 177.8 cm (70\")   Wt 104 kg (228 lb 8 oz)   SpO2 98%   BMI 32.79 kg/m²  Body mass index is 32.79 kg/m². 98% 104 kg (228 lb 8 oz)  Physical Exam  Cardiovascular:      Heart sounds: Murmur heard.   Pulmonary:      Breath sounds: Rhonchi and rales present.         LABS:  Lab Results   Component Value Date    CALCIUM 8.0 (L) 05/11/2022    PHOS 2.7 05/11/2022     Results from last 7 days   Lab Units 05/11/22  0555 05/11/22  0253 05/10/22  0706 05/09/22  0528 05/09/22  0228 05/08/22  0446 05/07/22  0417 05/06/22  1045   MAGNESIUM mg/dL 2.1  --   --   --  1.9 2.0  --   --    SODIUM mmol/L 133*  --  135*  --  130* 133*   < > 138   POTASSIUM mmol/L 3.2*  --  4.1  --  4.0 4.0   < > 3.8   CHLORIDE mmol/L 97*  --  97*  --  93* 97*   < > 95*   CO2 mmol/L 24.0  --  24.0  --  24.0 25.0   < > 30.0*   BUN mg/dL 21  --  17  --  24* 19   < > 15   CREATININE mg/dL 3.41*  --  2.86*  --  3.39* 2.94*   < > 2.74*   GLUCOSE mg/dL 167*  --  132*  --  133* 116*   < > 99   CALCIUM mg/dL 8.0*  --  8.7  --  7.9* 8.1*   < > 8.1*   WBC 10*3/mm3 11.20*  --  6.70 6.90  --  6.10   < > 5.20   HEMOGLOBIN g/dL 10.5*  --  11.9* 10.8*  --  10.9*   < > 11.0*   HEMOGLOBIN, POC g/dL  --  13.4  --   --   --   --   --   --    PLATELETS 10*3/mm3 234  --  213 200  --  173   < > 203   ALT (SGPT) U/L 8  --  6  --   --  5  --  5   AST (SGOT) U/L 10  --  12  --   --  12  " --  11   PROBNP pg/mL  --   --   --   --   --   --   --  >70,000.0*   PROCALCITONIN ng/mL 0.27*  --   --   --   --   --   --   --     < > = values in this interval not displayed.     Lab Results   Component Value Date    TROPONINT 0.311 (C) 05/06/2022     Results from last 7 days   Lab Units 05/06/22  1828 05/06/22  1045   TROPONIN T ng/mL 0.311* 0.330*     Results from last 7 days   Lab Units 05/07/22  1519 05/06/22  1116 05/06/22  1057   BLOODCX  No growth at 3 days Streptococcus gallolyticus ssp pasteurianus* Streptococcus gallolyticus ssp pasteurianus*   BCIDPCR   --  Streptococcus spp, not A, B, or pneumonia. Identification by BCID2 PCR.*  --      Results from last 7 days   Lab Units 05/11/22  0555 05/11/22  0253 05/06/22  1049   PROCALCITONIN ng/mL 0.27*  --   --    LACTATE mmol/L 1.4 1.7 0.9     Results from last 7 days   Lab Units 05/11/22  0253   PH, ARTERIAL pH units 7.362   PCO2, ARTERIAL mm Hg 47.0   PO2 ART mm Hg 100.1   O2 SATURATION ART % 97.4   MODALITY  Cannula         Results from last 7 days   Lab Units 05/10/22  0706   INR  1.09     Results from last 7 days   Lab Units 05/07/22  1519 05/06/22  1116 05/06/22  1057   BLOODCX  No growth at 3 days Streptococcus gallolyticus ssp pasteurianus* Streptococcus gallolyticus ssp pasteurianus*   BCIDPCR   --  Streptococcus spp, not A, B, or pneumonia. Identification by BCID2 PCR.*  --      Lab Results   Component Value Date    TSH 8.740 (H) 03/12/2022     Estimated Creatinine Clearance: 24 mL/min (A) (by C-G formula based on SCr of 3.41 mg/dL (H)).         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     FINDINGS:  Interval placement of a left IJ central venous line with the tip in the  cavoatrial junction region. No pneumothorax. Right IJ dialysis catheter  remains in place. Cardiomegaly with coronary artery stent again  demonstrated. Haziness of the lungs with indistinct vessels and right  larger than left pleural effusions, not significantly changed        Impression:         1. No pneumothorax status post central venous line placement  2. Stable appearance of pulmonary edema and bilateral pleural effusions     Electronically Signed By-Norman Paige On:5/11/2022 7:39 AM  This report was finalized on [id number] by  Norman Paige, .    XR Chest 1 View [[id number]] Collected: 05/11/22 0208     Updated: 05/11/22 0210    Narrative:      Chest one view.    DATE: 5/11/2022.    COMPARISON: 5/6/2022.    CLINICAL HISTORY: Status post code.      Impression:        Midline sternotomy wires are present. Right dual-lumen central venous catheter is unchanged.    The cardiac silhouette is moderately to significantly enlarged and there is worsening interstitial and airspace changes bilaterally which is likely related to a component of edema. There also appears to be moderate bilateral pleural effusions which are   very similar to the previous examination. Superimposed infection would be difficult to exclude.    Electronically signed by:  Sal Nieto D.O.    5/11/2022 12:08 AM            Current Meds:   SCHEDULE  atorvastatin, 40 mg, Oral, Nightly  budesonide, 0.5 mg, Nebulization, BID  cefTRIAXone, 2 g, Intravenous, Q24H  donepezil, 10 mg,  Oral, Nightly  insulin lispro, 0-7 Units, Subcutaneous, TID AC  ipratropium-albuterol, 3 mL, Nebulization, TID - RT  levothyroxine, 50 mcg, Oral, Q AM  metoprolol tartrate, 37.5 mg, Oral, BID  midazolam, , ,   midodrine, 15 mg, Oral, TID AC  pantoprazole, 40 mg, Oral, QAM  polyethylene glycol, 17 g, Oral, BID  sertraline, 50 mg, Oral, Daily  sodium chloride, 10 mL, Intravenous, Q12H  sorbitol, 50 mL, Oral, Daily      Infusions  amiodarone, 0.5 mg/min, Last Rate: 0.5 mg/min (05/11/22 0913)      PRNs  •  acetaminophen **OR** acetaminophen **OR** acetaminophen  •  albumin human  •  aluminum-magnesium hydroxide-simethicone  •  dextrose  •  dextrose  •  glucagon (human recombinant)  •  insulin lispro **AND** insulin lispro  •  ipratropium-albuterol  •  melatonin  •  Morphine  •  ondansetron **OR** ondansetron  •  oxyCODONE  •  sodium chloride  •  sodium chloride        Angel Prajapati MD  5/11/2022  10:08 EDT      Much of this encounter note is an electronic transcription/translation of spoken language to printed text using Dragon Software.

## 2022-05-11 NOTE — NURSING NOTE
HD complete without complications. Blood returned and CVC lumens locked with heparin X2. 1500 mL removed. Pt stable    Pre- Vitals    /39  HR 62  RR 12  Weight: 104kg      Post Vitals    /41  HR 64  RR 10  Weight: 103.7kg

## 2022-05-11 NOTE — PROGRESS NOTES
Referring Provider: Angel Prajapati MD    Reason for follow-up:  Shortness of breath       Patient Care Team:  Rudolph Rooney MD as PCP - General (Family Medicine)  Samantha Zabala APRN as PCP - Family Medicine  Jose G Rm MD as Consulting Physician (Cardiology)    Subjective .      ROS    Since I have last seen, the patient has been without any chest discomfort ,shortness of breath, palpitations, dizziness or syncope.  Denies having any headache ,abdominal pain ,nausea, vomiting , diarrhea constipation, loss of weight or loss of appetite.  Denies having any excessive bruising ,hematuria or blood in the stool.    Review of all systems negative except as indicated    History  Past Medical History:   Diagnosis Date   • A-fib ([facility]) 8/3/2021   • Anemia    • Appetite loss    • Arthritis    • Brain bleed ([facility])    • Carpal tunnel syndrome on left    • CHF (congestive heart failure) ([facility])    • Chronic left-sided low back pain without sciatica 12/27/2017   • CKD (chronic kidney disease) stage 3, GFR 30-59 ml/min ([facility])    • Closed fracture of seventh thoracic vertebra ([facility]) 8/23/2020   • Cognitive communication deficit 12/1/2020   • COPD (chronic obstructive pulmonary disease) ([facility])    • Coronary artery disease    • COVID-19 2/19/2021   • Cytokine release syndrome, grade 1 5/24/2021   • Depression    • Diabetic neuropathy ([facility])    • Dialysis patient ([facility])     mon wed fri   • DM2 (diabetes mellitus, type 2) ([facility])    • GERD (gastroesophageal reflux disease)    • Hyperlipidemia    • Hypertension    • Hypogonadism in male    • Neuropathy    • Nonsustained ventricular tachycardia ([facility]) 7/23/2021   • Obesity    • Paroxysmal atrial fibrillation ([facility]) 12/1/2020   • Sleep apnea    • Stroke ([facility])    • Unsteady gait        Past Surgical History:   Procedure Laterality Date   • ANGIOPLASTY      X2   • APPENDECTOMY     • ARTERIOVENOUS FISTULA/SHUNT SURGERY Left 1/20/2022    Procedure: LEFT BRACHIAL CEPHALIC ARTERIAL VENOUS FISTULA  CREATION;  Surgeon: Yony Davila MD;  Location:  [facility];  Service: Vascular;  Laterality: Left;   • CARDIAC CATHETERIZATION  11/13/2015   • CARDIAC CATHETERIZATION N/A 5/24/2021    Procedure: Left Heart Cath and coronary angiogram;  Surgeon: Jose G Rm MD;  Location:  [facility];  Service: Cardiovascular;  Laterality: N/A;   • CARDIAC CATHETERIZATION  5/24/2021    Procedure: Saphenous Vein Graft;  Surgeon: Jose G Rm MD;  Location:  [facility];  Service: Cardiovascular;;   • CARDIAC CATHETERIZATION N/A 5/24/2021    Procedure: Percutaneous Coronary Intervention;  Surgeon: Bernabe Zambrano MD;  Location:  [facility];  Service: Cardiology;  Laterality: N/A;   • CARDIAC CATHETERIZATION N/A 5/24/2021    Procedure: Stent NIELS coronary;  Surgeon: Bernabe Zambrano MD;  Location:  [facility];  Service: Cardiology;  Laterality: N/A;   • CARDIAC CATHETERIZATION N/A 5/26/2021    Procedure: Percutaneous Coronary Intervention;  Surgeon: Bernabe Zambrano MD;  Location:  [facility];  Service: Cardiovascular;  Laterality: N/A;   • CARDIAC CATHETERIZATION N/A 5/26/2021    Procedure: Stent NIELS bypass graft;  Surgeon: Bernabe Zambrano MD;  Location:  [facility];  Service: Cardiovascular;  Laterality: N/A;   • CARDIAC ELECTROPHYSIOLOGY PROCEDURE N/A 5/24/2021    Procedure: Temporary Pacemaker;  Surgeon: Bernabe Zambrano MD;  Location:  [facility];  Service: Cardiology;  Laterality: N/A;   • CARDIAC ELECTROPHYSIOLOGY PROCEDURE N/A 5/26/2021    Procedure: Temporary Pacemaker;  Surgeon: Bernabe Zambrano MD;  Location:  [facility];  Service: Cardiovascular;  Laterality: N/A;   • CARPAL TUNNEL RELEASE Left 04/29/2018    carpal tunnel- lt hand// other hand surgeries    • CATARACT EXTRACTION, BILATERAL  2002    Dr. Lux Acosta   • CHOLECYSTECTOMY     • COLON RESECTION  2005   • CORONARY  ANGIOPLASTY     • CORONARY ANGIOPLASTY WITH STENT PLACEMENT  11/13/2015    PTCA stent to proximal in stent and mid to distal lad   • CORONARY ANGIOPLASTY WITH STENT PLACEMENT  09/16/2016    PTCA stent to mid lad and stent to vein graft to marginal   • CORONARY ARTERY BYPASS GRAFT  2005    @ [facility]   • CYST REMOVAL      cyst removed from scrotum   • FOOT SURGERY Right 07/17/2018   • FOOT SURGERY Left 02/04/2019   • PORTACATH PLACEMENT     • TOE AMPUTATION Right     right toe removed D/T infected cut that went to the bone       Family History   Problem Relation Age of Onset   • Heart disease Mother    • Heart disease Father    • Diabetes Sister    • Heart disease Sister    • Diabetes Brother    • Mental illness Brother        Social History     Tobacco Use   • Smoking status: Former Smoker     Packs/day: 1.00     Years: 60.00     Pack years: 60.00     Types: Cigarettes   • Smokeless tobacco: Never Used   • Tobacco comment: Patient quit smoking 11/2020   Vaping Use   • Vaping Use: Never used   Substance Use Topics   • Alcohol use: No   • Drug use: Yes     Frequency: 1.0 times per week     Types: Marijuana     Comment: socially        Medications Prior to Admission   Medication Sig Dispense Refill Last Dose   • albuterol sulfate  (90 Base) MCG/ACT inhaler Inhale 2 puffs Every 4 (Four) Hours.   5/6/2022 at Unknown time   • apixaban (ELIQUIS) 5 MG tablet tablet Take 1 tablet by mouth Every 12 (Twelve) Hours. Indications: Atrial Fibrillation 60 tablet  5/6/2022 at Unknown time   • atorvastatin (LIPITOR) 40 MG tablet Take 40 mg by mouth Every Night.   5/5/2022 at Unknown time   • budesonide (Pulmicort) 0.5 MG/2ML nebulizer solution Take 2 mL by nebulization 2 (Two) Times a Day.   5/6/2022 at Unknown time   • cholecalciferol (VITAMIN D3) 25 MCG (1000 UT) tablet Take 1,000 Units by mouth Daily.   5/6/2022 at Unknown time   • docusate sodium (COLACE) 100 MG capsule Take 100 mg by mouth Daily.   5/6/2022 at Unknown time   •  donepezil (ARICEPT) 10 MG tablet Take 10 mg by mouth Every Night.   5/5/2022 at Unknown time   • fluticasone (FLONASE) 50 MCG/ACT nasal spray 2 sprays by Each Nare route 2 (Two) Times a Day.   5/6/2022 at Unknown time   • guaiFENesin (MUCINEX) 600 MG 12 hr tablet Take 600 mg by mouth 2 (Two) Times a Day.   5/6/2022 at Unknown time   • HYDROcodone-acetaminophen (NORCO) 5-325 MG per tablet Take 1 tablet by mouth 3 (Three) Times a Day.   5/6/2022 at Unknown time   • ipratropium-albuterol (DUO-NEB) 0.5-2.5 mg/3 ml nebulizer Take 3 mL by nebulization 3 (Three) Times a Day. 360 mL  5/6/2022 at Unknown time   • ipratropium-albuterol (DUO-NEB) 0.5-2.5 mg/3 ml nebulizer Take 3 mL by nebulization Every 4 (Four) Hours As Needed for Wheezing. 360 mL  5/6/2022 at Unknown time   • levothyroxine (SYNTHROID, LEVOTHROID) 50 MCG tablet Take 50 mcg by mouth Every Morning.   5/6/2022 at Unknown time   • Metoprolol Tartrate 37.5 MG tablet Take 37.5 mg by mouth 2 (Two) Times a Day. Hold for SBP <110 or HR < 60   5/6/2022 at Unknown time   • midodrine (PROAMATINE) 10 MG tablet Take 10 mg by mouth 3 (Three) Times a Day Before Meals. Hold for BP > 140/90   5/6/2022 at Unknown time   • omeprazole (priLOSEC) 20 MG capsule Take 20 mg by mouth Daily.   5/6/2022 at Unknown time   • polyethylene glycol (MiraLax) 17 g packet Take 17 g by mouth 2 (Two) Times a Day.   5/6/2022 at Unknown time   • sertraline (ZOLOFT) 50 MG tablet Take 50 mg by mouth Daily.   5/6/2022 at Unknown time   • vitamin B-12 (CYANOCOBALAMIN) 1000 MCG tablet Take 1,000 mcg by mouth Daily.   5/6/2022 at Unknown time   • bisacodyl (DULCOLAX) 10 MG suppository Insert 10 mg into the rectum Daily As Needed for Constipation.   Unknown at Unknown time   • magnesium hydroxide (MILK OF MAGNESIA) 400 MG/5ML suspension Take 30 mL by mouth Daily As Needed for Constipation.   Unknown at Unknown time   • ondansetron (ZOFRAN) 4 MG tablet Take 4 mg by mouth Every 8 (Eight) Hours As Needed for  "Nausea or Vomiting.   Unknown at Unknown time   • phenylephrine-mineral oil-petrolatum (Preparation H) 0.25-14-74.9 % ointment hemorrhoidal ointment Insert 1 application into the rectum Daily As Needed.   Unknown at Unknown time       Allergies  Codeine    Scheduled Meds:atorvastatin, 40 mg, Oral, Nightly  budesonide, 0.5 mg, Nebulization, BID  cefTRIAXone, 2 g, Intravenous, Q24H  donepezil, 10 mg, Oral, Nightly  insulin lispro, 0-7 Units, Subcutaneous, TID AC  ipratropium-albuterol, 3 mL, Nebulization, TID - RT  levothyroxine, 50 mcg, Oral, Q AM  metoprolol tartrate, 37.5 mg, Oral, BID  midazolam, , ,   midodrine, 10 mg, Oral, TID AC  pantoprazole, 40 mg, Oral, QAM  polyethylene glycol, 17 g, Oral, BID  potassium chloride, 40 mEq, Oral, Once  sertraline, 50 mg, Oral, Daily  sodium chloride, 10 mL, Intravenous, Q12H  sorbitol, 50 mL, Oral, Daily      Continuous Infusions:amiodarone, 1 mg/min, Last Rate: 1 mg/min (05/11/22 0308)   Followed by  amiodarone, 0.5 mg/min      PRN Meds:.•  acetaminophen **OR** acetaminophen **OR** acetaminophen  •  albumin human  •  aluminum-magnesium hydroxide-simethicone  •  dextrose  •  dextrose  •  glucagon (human recombinant)  •  insulin lispro **AND** insulin lispro  •  ipratropium-albuterol  •  melatonin  •  Morphine  •  ondansetron **OR** ondansetron  •  oxyCODONE  •  sodium chloride  •  sodium chloride    Objective     VITAL SIGNS  Vitals:    05/11/22 0545 05/11/22 0600 05/11/22 0615 05/11/22 0644   BP:       BP Location:       Patient Position:       Pulse: 66 78 65 63   Resp:    16   Temp:       TempSrc:       SpO2: 97% 94% 93% 96%   Weight:       Height:           Flowsheet Rows    Flowsheet Row First Filed Value   Admission Height 177.8 cm (70\") Documented at 05/06/2022 1026   Admission Weight 104 kg (229 lb 0.9 oz) Documented at 05/06/2022 1026            Intake/Output Summary (Last 24 hours) at 5/11/2022 0647  Last data filed at 5/11/2022 0039  Gross per 24 hour   Intake 180 " ml   Output --   Net 180 ml        TELEMETRY:    Physical Exam:  The patient is alert, oriented and in no distress.  Vital signs as noted above.  Exogenous obesity.  Head and neck revealed no carotid bruits or jugular venous distention.  No thyromegaly or lymphadenopathy is present  Lungs clear.  No wheezing.  Breath sounds are normal bilaterally.  Heart normal first and second heart sounds.  No murmur. No precordial rub is present.  No gallop is present.  Abdomen soft and nontender.  No organomegaly is present.  Extremities with good peripheral pulses without any pedal edema.  Skin warm and dry.  Musculoskeletal system is grossly normal  CNS grossly normal      Results Review:   I reviewed the patient's new clinical results.  Lab Results (last 24 hours)     Procedure Component Value Units Date/Time    Comprehensive Metabolic Panel [[id number]]  (Abnormal) Collected: 05/11/22 0555    Specimen: Blood Updated: 05/11/22 0633     Glucose 167 mg/dL      BUN 21 mg/dL      Creatinine 3.41 mg/dL      Sodium 133 mmol/L      Potassium 3.2 mmol/L      Chloride 97 mmol/L      CO2 24.0 mmol/L      Calcium 8.0 mg/dL      Total Protein 5.3 g/dL      Albumin 2.80 g/dL      ALT (SGPT) 8 U/L      AST (SGOT) 10 U/L      Alkaline Phosphatase 46 U/L      Total Bilirubin 0.3 mg/dL      Globulin 2.5 gm/dL      A/G Ratio 1.1 g/dL      BUN/Creatinine Ratio 6.2     Anion Gap 12.0 mmol/L      eGFR 18.5 mL/min/1.73      Comment: National Kidney Foundation and American Society of Nephrology (ASN) Task Force recommended calculation based on the Chronic Kidney Disease Epidemiology Collaboration (CKD-EPI) equation refit without adjustment for race.       Narrative:      GFR Normal >60  Chronic Kidney Disease <60  Kidney Failure <15      Lipid Panel [[id number]] Collected: 05/11/22 0555    Specimen: Blood Updated: 05/11/22 0633     Total Cholesterol 117 mg/dL      Triglycerides 98 mg/dL      HDL Cholesterol 47 mg/dL      LDL Cholesterol  52 mg/dL       VLDL Cholesterol 18 mg/dL      LDL/HDL Ratio 1.07    Narrative:      Cholesterol Reference Ranges  (U.S. Department of Health and Human Services ATP III Classifications)    Desirable          <200 mg/dL  Borderline High    200-239 mg/dL  High Risk          >240 mg/dL      Triglyceride Reference Ranges  (U.S. Department of Health and Human Services ATP III Classifications)    Normal           <150 mg/dL  Borderline High  150-199 mg/dL  High             200-499 mg/dL  Very High        >500 mg/dL    HDL Reference Ranges  (U.S. Department of Health and Human Services ATP III Classifications)    Low     <40 mg/dl (major risk factor for CHD)  High    >60 mg/dl ('negative' risk factor for CHD)        LDL Reference Ranges  (U.S. Department of Health and Human Services ATP III Classifications)    Optimal          <100 mg/dL  Near Optimal     100-129 mg/dL  Borderline High  130-159 mg/dL  High             160-189 mg/dL  Very High        >189 mg/dL    Phosphorus [[id number]]  (Normal) Collected: 05/11/22 0555    Specimen: Blood Updated: 05/11/22 0632     Phosphorus 2.7 mg/dL     Magnesium [[id number]]  (Normal) Collected: 05/11/22 0555    Specimen: Blood Updated: 05/11/22 0632     Magnesium 2.1 mg/dL     Procalcitonin [[id number]]  (Abnormal) Collected: 05/11/22 0555    Specimen: Blood Updated: 05/11/22 0631     Procalcitonin 0.27 ng/mL     Narrative:      As a Marker for Sepsis (Non-Neonates):    1. <0.5 ng/mL represents a low risk of severe sepsis and/or septic shock.  2. >2 ng/mL represents a high risk of severe sepsis and/or septic shock.    As a Marker for Lower Respiratory Tract Infections that require antibiotic therapy:    PCT on Admission    Antibiotic Therapy       6-12 Hrs later    >0.5                Strongly Recommended  >0.25 - <0.5        Recommended   0.1 - 0.25          Discouraged              Remeasure/reassess PCT  <0.1                Strongly Discouraged     Remeasure/reassess PCT    As 28 day mortality risk  "marker: \"Change in Procalcitonin Result\" (>80% or <=80%) if Day 0 (or Day 1) and Day 4 values are available. Refer to http://www.[email or web address]-pct-calculator.com    Change in PCT <=80%  A decrease of PCT levels below or equal to 80% defines a positive change in PCT test result representing a higher risk for 28-day all-cause mortality of patients diagnosed with severe sepsis for septic shock.    Change in PCT >80%  A decrease of PCT levels of more than 80% defines a negative change in PCT result representing a lower risk for 28-day all-cause mortality of patients diagnosed with severe sepsis or septic shock.       Lactic Acid, Plasma [[id number]]  (Normal) Collected: 05/11/22 0555    Specimen: Blood Updated: 05/11/22 0630     Lactate 1.4 mmol/L     CBC & Differential [[id number]]  (Abnormal) Collected: 05/11/22 0144    Specimen: Blood Updated: 05/11/22 0616    Narrative:      The following orders were created for panel order CBC & Differential.  Procedure                               Abnormality         Status                     ---------                               -----------         ------                     CBC Auto Differential[[id number]]        Abnormal            Final result               Scan Slide[[id number]]                                                                    Please view results for these tests on the individual orders.    CBC Auto Differential [[id number]]  (Abnormal) Collected: 05/11/22 0555    Specimen: Blood Updated: 05/11/22 0616     WBC 11.20 10*3/mm3      RBC 3.71 10*6/mm3      Hemoglobin 10.5 g/dL      Hematocrit 34.9 %      MCV 94.0 fL      MCH 28.3 pg      MCHC 30.1 g/dL      RDW 16.9 %      RDW-SD 56.4 fl      MPV 7.5 fL      Platelets 234 10*3/mm3      Neutrophil % 85.5 %      Lymphocyte % 6.6 %      Monocyte % 7.2 %      Eosinophil % 0.2 %      Basophil % 0.5 %      Neutrophils, Absolute 9.60 10*3/mm3      Lymphocytes, Absolute 0.70 10*3/mm3      Monocytes, Absolute 0.80 10*3/mm3      " Eosinophils, Absolute 0.00 10*3/mm3      Basophils, Absolute 0.10 10*3/mm3      nRBC 0.0 /100 WBC     MRSA Screen, PCR (Inpatient) - Swab, Nares [[id number]] Collected: 05/11/22 0557    Specimen: Swab from Nares Updated: 05/11/22 0600    POC Glucose Once [[id number]]  (Abnormal) Collected: 05/11/22 0253    Specimen: Blood Updated: 05/11/22 0256     Glucose 184 mg/dL      Comment: Serial Number: [id number]:  [id number]       POC Lactate [[id number]]  (Normal) Collected: 05/11/22 0253    Specimen: Blood Updated: 05/11/22 0256     Lactate 1.7 mmol/L      Comment: Serial Number: [id number]:  [id number]       POCT Electrolytes +HGB +HCT [[id number]]  (Abnormal) Collected: 05/11/22 0253    Specimen: Blood Updated: 05/11/22 0256     Sodium 137 mmol/L      POC Potassium 3.0 mmol/L      Ionized Calcium 1.14 mmol/L      Comment: Serial Number: [id number]:  [id number]        Glucose 184 mg/dL      Hematocrit 39 %      Hemoglobin 13.4 g/dL     Blood Gas, Arterial - [[id number]] Collected: 05/11/22 0253    Specimen: Arterial Blood Updated: 05/11/22 0256     Site Right Radial     Fitz's Test Positive     pH, Arterial 7.362 pH units      pCO2, Arterial 47.0 mm Hg      pO2, Arterial 100.1 mm Hg      HCO3, Arterial 26.7 mmol/L      Base Excess, Arterial 0.7 mmol/L      Comment: Serial Number: [id number]:  [id number]        O2 Saturation, Arterial 97.4 %      CO2 Content 28.1 mmol/L      Barometric Pressure for Blood Gas --     Comment: N/A        Modality Cannula     FIO2 <21 %      Hemodilution No    POC Glucose Once [[id number]]  (Normal) Collected: 05/10/22 1956    Specimen: Blood Updated: 05/10/22 1958     Glucose 104 mg/dL      Comment: Serial Number: [id number]:  [id number]       POC Glucose Once [[id number]]  (Abnormal) Collected: 05/10/22 1654    Specimen: Blood Updated: 05/10/22 1704     Glucose 118 mg/dL      Comment: Serial Number: [id number]:  [id number]       Blood Culture - Blood, Hand, Right [[id number]]  (Normal)  Collected: 05/07/22 1519    Specimen: Blood from Hand, Right Updated: 05/10/22 1617     Blood Culture No growth at 3 days    POC Glucose Once [[id number]]  (Abnormal) Collected: 05/10/22 1213    Specimen: Blood Updated: 05/10/22 1223     Glucose 128 mg/dL      Comment: Serial Number: [id number]:  [id number]       COVID PRE-OP / PRE-PROCEDURE SCREENING ORDER (NO ISOLATION) - Swab, Nasopharynx [[id number]]  (Normal) Collected: 05/10/22 0635    Specimen: Swab from Nasopharynx Updated: 05/10/22 1143    Narrative:      The following orders were created for panel order COVID PRE-OP / PRE-PROCEDURE SCREENING ORDER (NO ISOLATION) - Swab, Nasopharynx.  Procedure                               Abnormality         Status                     ---------                               -----------         ------                     COVID-19,CEPHEID/ROHAN,CO...[[id number]]  Normal              Final result                 Please view results for these tests on the individual orders.    COVID-19,CEPHEID/ROHAN,COR/[facility]/PAD/BEATRICE IN-HOUSE(OR EMERGENT/ADD-ON),NP SWAB IN TRANSPORT MEDIA 3-4 HR TAT, RT-PCR - Swab, Nasopharynx [[id number]]  (Normal) Collected: 05/10/22 0635    Specimen: Swab from Nasopharynx Updated: 05/10/22 1143     COVID19 Not Detected    Narrative:      Fact sheet for providers: https://www.fda.gov/media/917789/download     Fact sheet for patients: https://www.fda.gov/media/494342/download  Fact sheet for providers: https://www.fda.gov/media/297090/download     Fact sheet for patients: https://www.fda.gov/media/099419/download    CANDIDA AURIS SCREEN - Swab, Axilla Right, Axilla Left and Groin [[id number]]  (Normal) Collected: 05/07/22 1020    Specimen: Swab from Axilla Right, Axilla Left and Groin Updated: 05/10/22 1032     Candida Auris Screen Culture No Candida auris isolated at 3 days    Protime-INR [[id number]]  (Normal) Collected: 05/10/22 0706    Specimen: Blood Updated: 05/10/22 0824     Protime 11.2 Seconds      INR  1.09    Comprehensive Metabolic Panel [[id number]]  (Abnormal) Collected: 05/10/22 0706    Specimen: Blood Updated: 05/10/22 0822     Glucose 132 mg/dL      BUN 17 mg/dL      Creatinine 2.86 mg/dL      Sodium 135 mmol/L      Potassium 4.1 mmol/L      Chloride 97 mmol/L      CO2 24.0 mmol/L      Calcium 8.7 mg/dL      Total Protein 6.2 g/dL      Albumin 2.90 g/dL      ALT (SGPT) 6 U/L      AST (SGOT) 12 U/L      Alkaline Phosphatase 56 U/L      Total Bilirubin 0.4 mg/dL      Globulin 3.3 gm/dL      A/G Ratio 0.9 g/dL      BUN/Creatinine Ratio 5.9     Anion Gap 14.0 mmol/L      eGFR 22.8 mL/min/1.73      Comment: National Kidney Foundation and American Society of Nephrology (ASN) Task Force recommended calculation based on the Chronic Kidney Disease Epidemiology Collaboration (CKD-EPI) equation refit without adjustment for race.       Narrative:      GFR Normal >60  Chronic Kidney Disease <60  Kidney Failure <15      CBC & Differential [[id number]]  (Abnormal) Collected: 05/10/22 0706    Specimen: Blood Updated: 05/10/22 0811    Narrative:      The following orders were created for panel order CBC & Differential.  Procedure                               Abnormality         Status                     ---------                               -----------         ------                     CBC Auto Differential[[id number]]        Abnormal            Final result                 Please view results for these tests on the individual orders.    CBC Auto Differential [[id number]]  (Abnormal) Collected: 05/10/22 0706    Specimen: Blood Updated: 05/10/22 0811     WBC 6.70 10*3/mm3      RBC 4.14 10*6/mm3      Hemoglobin 11.9 g/dL      Hematocrit 39.4 %      MCV 95.2 fL      MCH 28.8 pg      MCHC 30.2 g/dL      RDW 17.2 %      RDW-SD 58.2 fl      MPV 8.1 fL      Platelets 213 10*3/mm3      Neutrophil % 74.0 %      Lymphocyte % 16.9 %      Monocyte % 8.6 %      Eosinophil % 0.1 %      Basophil % 0.4 %      Neutrophils, Absolute 5.00  10*3/mm3      Lymphocytes, Absolute 1.10 10*3/mm3      Monocytes, Absolute 0.60 10*3/mm3      Eosinophils, Absolute 0.00 10*3/mm3      Basophils, Absolute 0.00 10*3/mm3      nRBC 0.1 /100 WBC     POC Glucose Once [[id number]]  (Abnormal) Collected: 05/10/22 0720    Specimen: Blood Updated: 05/10/22 0725     Glucose 118 mg/dL      Comment: Serial Number: [id number]:  [id number]             Imaging Results (Last 24 Hours)     Procedure Component Value Units Date/Time    XR Chest 1 View [[id number]] Resulted: 05/11/22 0444     Updated: 05/11/22 0601    XR Chest 1 View [[id number]] Collected: 05/11/22 0208     Updated: 05/11/22 0210    Narrative:      Chest one view.    DATE: 5/11/2022.    COMPARISON: 5/6/2022.    CLINICAL HISTORY: Status post code.      Impression:        Midline sternotomy wires are present. Right dual-lumen central venous catheter is unchanged.    The cardiac silhouette is moderately to significantly enlarged and there is worsening interstitial and airspace changes bilaterally which is likely related to a component of edema. There also appears to be moderate bilateral pleural effusions which are   very similar to the previous examination. Superimposed infection would be difficult to exclude.    Electronically signed by:  Sal Nieto D.O.    5/11/2022 12:08 AM      LAB RESULTS (LAST 7 DAYS)    CBC  Results from last 7 days   Lab Units 05/11/22  0555 05/11/22  0253 05/10/22  0706 05/09/22  0528 05/08/22  0446 05/07/22  0417 05/06/22  1045   WBC 10*3/mm3 11.20*  --  6.70 6.90 6.10 6.70 5.20   RBC 10*6/mm3 3.71*  --  4.14 3.65* 3.68* 3.69* 3.78*   HEMOGLOBIN g/dL 10.5*  --  11.9* 10.8* 10.9* 10.9* 11.0*   HEMOGLOBIN, POC g/dL  --  13.4  --   --   --   --   --    HEMATOCRIT % 34.9*  --  39.4 34.6* 35.8* 35.7* 35.8*   HEMATOCRIT POC %  --  39  --   --   --   --   --    MCV fL 94.0  --  95.2 94.7 97.2* 96.6 94.5   PLATELETS 10*3/mm3 234  --  213 200 173 163 203       BMP  Results from last 7 days    Lab Units 05/11/22  0555 05/10/22  0706 05/09/22  0228 05/08/22  0446 05/07/22  0417 05/06/22  1045   SODIUM mmol/L 133* 135* 130* 133* 134* 138   POTASSIUM mmol/L 3.2* 4.1 4.0 4.0 3.9 3.8   CHLORIDE mmol/L 97* 97* 93* 97* 97* 95*   CO2 mmol/L 24.0 24.0 24.0 25.0 26.0 30.0*   BUN mg/dL 21 17 24* 19 12 15   CREATININE mg/dL 3.41* 2.86* 3.39* 2.94* 2.24* 2.74*   GLUCOSE mg/dL 167* 132* 133* 116* 164* 99   MAGNESIUM mg/dL 2.1  --  1.9 2.0  --   --    PHOSPHORUS mg/dL 2.7  --  2.2* 2.2*  --   --        CMP   Results from last 7 days   Lab Units 05/11/22  0555 05/10/22  0706 05/09/22  0228 05/08/22  0446 05/07/22  0417 05/06/22  1045   SODIUM mmol/L 133* 135* 130* 133* 134* 138   POTASSIUM mmol/L 3.2* 4.1 4.0 4.0 3.9 3.8   CHLORIDE mmol/L 97* 97* 93* 97* 97* 95*   CO2 mmol/L 24.0 24.0 24.0 25.0 26.0 30.0*   BUN mg/dL 21 17 24* 19 12 15   CREATININE mg/dL 3.41* 2.86* 3.39* 2.94* 2.24* 2.74*   GLUCOSE mg/dL 167* 132* 133* 116* 164* 99   ALBUMIN g/dL 2.80* 2.90*  --  2.60*  --  3.10*   BILIRUBIN mg/dL 0.3 0.4  --  0.4  --  0.7   ALK PHOS U/L 46 56  --  52  --  59   AST (SGOT) U/L 10 12  --  12  --  11   ALT (SGPT) U/L 8 6  --  5  --  5         BNP        TROPONIN  Results from last 7 days   Lab Units 05/06/22  1828   TROPONIN T ng/mL 0.311*       CoAg  Results from last 7 days   Lab Units 05/10/22  0706   INR  1.09       Creatinine Clearance  Estimated Creatinine Clearance: 24 mL/min (A) (by C-G formula based on SCr of 3.41 mg/dL (H)).    ABG  Results from last 7 days   Lab Units 05/11/22  0253   PH, ARTERIAL pH units 7.362   PCO2, ARTERIAL mm Hg 47.0   PO2 ART mm Hg 100.1   O2 SATURATION ART % 97.4   BASE EXCESS ART mmol/L 0.7       Radiology  XR Chest 1 View    Result Date: 5/11/2022  Midline sternotomy wires are present. Right dual-lumen central venous catheter is unchanged. The cardiac silhouette is moderately to significantly enlarged and there is worsening interstitial and airspace changes bilaterally which is  likely related to a component of edema. There also appears to be moderate bilateral pleural effusions which are very similar to the previous examination. Superimposed infection would be difficult to exclude. Electronically signed by:  Sal Nieto D.O.  5/11/2022 12:08 AM          EKG                I personally viewed and interpreted the patient's EKG/Telemetry data:    ECHOCARDIOGRAM:    Results for orders placed during the hospital encounter of 05/06/22    Adult Transthoracic Echo Complete w/ Color, Spectral and Contrast if Necessary Per Protocol    Interpretation Summary  Severe global left ventricular hypokinesis, estimated LV ejection fraction of 20%.  Mild right ventricular enlargement  Moderate left atrial enlargement  Aortic valve not well visualized in short axis.  Dopplers do not reveal any abnormality but  Mitral valve, tricuspid valve appears structurally normal, while mitral and tricuspid regurgitation seen.  Calculated RV systolic pressure of 30 mmHg  No pericardial effusion seen.  Proximal aorta appears normal in size.          STRESS MYOVIEW:    Cardiolite (Tc-99m Sestamibi) stress test    CARDIAC CATHETERIZATION:            OTHER:         Assessment & Plan     Principal Problem:    Fluid overload  Active Problems:    S/P CABG (coronary artery bypass graft)    Primary hypertension    Elevated troponin    ANNETTE treated with BiPAP    Major depressive disorder, recurrent, mild (HCC)    Mixed hyperlipidemia    Flaccid hemiplegia affecting right dominant side (HCC)    Diabetic neuropathy ([facility])    Chronic obstructive pulmonary disease with (acute) exacerbation (HCC)    Anemia of renal disease    End stage renal disease ([facility])    Chronic respiratory failure with hypoxia, on home oxygen therapy ([facility])    Other specified hypothyroidism    Chronic systolic (congestive) heart failure ([facility])    Stage 5 chronic kidney disease on chronic dialysis (HCC)    Bacteremia    Normocytic anemia due to blood  loss      [[[[[[[[[[[[[[[[[[[[[[[[[    Impression  ==============     -Shortness of breath     - Positive blood cultures for Streptococcus 2 out of 2 sets.  Rule out tunneled catheter infection.     -Acute on chronic fluid overload.      -status post CABG 2005. status post stent to LAD 2009    Status post stent to LAD 11/13/2015  Status post stent to mid LAD and SVG to marginal branch 09/16/2016.  Status post stent to mid LAD 5/24/2021  Status post stent to SVG to RCA 5/26/2021     Cardiac catheterization 5/24/2021 revealed  Left ventricle angiogram was not performed due to pre-existing renal dysfunction.  Left main coronary artery normal.  Left anterior descending artery has mid segment lengthy 90% disease within the previously placed stent.  Distal left into descending artery has diffuse disease.  Circumflex coronary artery is totally occluded.  (Chronic)  Right coronary artery is chronically occluded.  SVG to marginal branch (jump graft to OM1 and OM 2) is totally occluded (new finding compared to 2015.  SVG to PDA has distal radiolucency.  However no definite obstructive disease is present.       -status post myocardial infarction 2000 and 2002 .      -history of intermittent but mostly persistent atrial fibrillation     -Acute on chronic congestive heart failure.  Compensated at this time.     Recent echocardiogram showed left ventricle dysfunction with ejection fraction of 35%.     -History of right-sided weakness with left MCA territory stroke.-Improved     Transesophageal echocardiogram 11/30/2020.  Biatrial enlargement.  Smoking effect in the left atrium and left ventricle and left atrial appendage.  Mild mitral and aortic regurgitation.  Left ventricle enlargement with diffuse hypocontractility with ejection fraction of 35 to 40%.     Echocardiogram 11/27/2020 revealed left atrial enlargement left ventricle dysfunction with ejection fraction of 40%.  Negative bubble study.      -diabetes hypertension and  sleep apnea.  ESRD.     -status post colon surgery (partial colectomy) appendectomy cholecystectomy right 4th toe removal and carpal tunnel surgery      -continued smoking 1 pack per day -abstinence from smoking      -allergy to codeine.  ============   Plan  ================  Shortness of breath  Positive blood cultures for Streptococcus 2 out of 2 sets.  Rule out tunneled catheter infection..  Review echocardiogram     Status post CABG  Patient is not having any angina pectoris or congestive heart failure     Atrial fibrillation-chronic  Rate is better controlled  Patient is on Coreg at 12.5 mg p.o. twice a day amiodarone and Cardizem.      ESRD  Patient is undergoing dialysis.      Status post stent to LAD 5/24/2021.  Status post stent to SVG to RCA 5/26/2021.    Hypokalemia  k 3.2-supplements.       Anticoagulation  Patient was on Eliquis 5 mg twice a day.  Observe for toxic effects.  Eliquis is on hold     Echocardiogram 3/12/2022 revealed ejection fraction of 25 to 30%.    Patient had CODE BLUE yesterday.  Patient apparently was in V. fib and was defibrillated.  Patient was apparently below and unconscious.  Patient gradually improved his mentation and patient was not intubated.  Patient apparently continued to be oxygenated well.  Amiodarone was ordered.  Patient was transferred to ICU.     Consider biventricular ICD if left ventricular function does not improve or congestive heart failure gets worse.    Follow-up labs ordered.     Further plan will depend on patient's progress.   ]]]]]]]]]]]]]]]]]]]]]]              Jose G Rm MD  05/11/22  06:47 EDT

## 2022-05-11 NOTE — PROCEDURES
"Insert Central Line At Bedside    Date/Time: 5/11/2022 4:50 AM  Performed by: Danielle Garcia APRN  Authorized by: Danielle Garcia APRN   Consent: Written consent obtained.  Risks and benefits: risks, benefits and alternatives were discussed  Consent given by: spouse  Patient understanding: patient states understanding of the procedure being performed  Patient consent: the patient's understanding of the procedure matches consent given  Procedure consent: procedure consent matches procedure scheduled  Required items: required blood products, implants, devices, and special equipment available  Patient identity confirmed: arm band and verbally with patient  Time out: Immediately prior to procedure a \"time out\" was called to verify the correct patient, procedure, equipment, support staff and site/side marked as required.  Indications: vascular access  Anesthesia: local infiltration    Anesthesia:  Local Anesthetic: lidocaine 1% without epinephrine  Anesthetic total: 6 mL    Sedation:  Patient sedated: no    Preparation: skin prepped with ChloraPrep  Skin prep agent dried: skin prep agent completely dried prior to procedure  Sterile barriers: all five maximum sterile barriers used - cap, mask, sterile gown, sterile gloves, and large sterile sheet  Hand hygiene: hand hygiene performed prior to central venous catheter insertion  Location details: left internal jugular  Site selection rationale: Patient has right tunnel cath. Right IJ difficult to visualize.  Patient position: flat  Catheter type: triple lumen  Catheter size: 7 Fr  Ultrasound guidance: yes  Sterile ultrasound techniques: sterile gel and sterile probe covers were used  Number of attempts: 1  Successful placement: yes  Post-procedure: line sutured and dressing applied  Assessment: blood return through all ports,  free fluid flow,  placement verified by x-ray and no pneumothorax on x-ray  Patient tolerance: patient tolerated the procedure well with " no immediate complications  Comments: Patient requiring blood pressure support. Unable to obtain peripheral access despite multiple attempts. Patient is status post ventricular fib arrest, on amio.      Electronically signed by FABIOLA Boothe, 05/11/22, 4:59 AM EDT.

## 2022-05-12 NOTE — H&P (VIEW-ONLY)
Referring Provider: Jeff Black MD    Reason for follow-up:  Shortness of breath       Patient Care Team:  Rudolph Rooney MD as PCP - General (Family Medicine)  Samantha Zabala APRN as PCP - Family Medicine  Jose G Rm MD as Consulting Physician (Cardiology)    Subjective .      ROS    Since I have last seen, the patient has been without any chest discomfort ,shortness of breath, palpitations, dizziness or syncope.  Denies having any headache ,abdominal pain ,nausea, vomiting , diarrhea constipation, loss of weight or loss of appetite.  Denies having any excessive bruising ,hematuria or blood in the stool.    Review of all systems negative except as indicated    History  Past Medical History:   Diagnosis Date   • A-fib (Formerly [facility]) 8/3/2021   • Anemia    • Appetite loss    • Arthritis    • Brain bleed (Formerly [facility])    • Carpal tunnel syndrome on left    • CHF (congestive heart failure) (Formerly [facility])    • Chronic left-sided low back pain without sciatica 12/27/2017   • CKD (chronic kidney disease) stage 3, GFR 30-59 ml/min (Formerly [facility])    • Closed fracture of seventh thoracic vertebra (Formerly [facility]) 8/23/2020   • Cognitive communication deficit 12/1/2020   • COPD (chronic obstructive pulmonary disease) (Formerly [facility])    • Coronary artery disease    • COVID-19 2/19/2021   • Cytokine release syndrome, grade 1 5/24/2021   • Depression    • Diabetic neuropathy (Formerly [facility])    • Dialysis patient (Formerly [facility])     mon wed fri   • DM2 (diabetes mellitus, type 2) (Formerly [facility])    • GERD (gastroesophageal reflux disease)    • Hyperlipidemia    • Hypertension    • Hypogonadism in male    • Neuropathy    • Nonsustained ventricular tachycardia (Formerly [facility]) 7/23/2021   • Obesity    • Paroxysmal atrial fibrillation (Formerly [facility]) 12/1/2020   • Sleep apnea    • Stroke (Formerly [facility])    • Unsteady gait        Past Surgical History:   Procedure Laterality Date   • ANGIOPLASTY      X2   • APPENDECTOMY     • ARTERIOVENOUS FISTULA/SHUNT SURGERY Left 1/20/2022    Procedure: LEFT BRACHIAL CEPHALIC ARTERIAL VENOUS  FISTULA CREATION;  Surgeon: Yony Davila MD;  Location: [facility] MAIN OR;  Service: Vascular;  Laterality: Left;   • CARDIAC CATHETERIZATION  11/13/2015   • CARDIAC CATHETERIZATION N/A 5/24/2021    Procedure: Left Heart Cath and coronary angiogram;  Surgeon: Jose G Rm MD;  Location:  [facility] CATH INVASIVE LOCATION;  Service: Cardiovascular;  Laterality: N/A;   • CARDIAC CATHETERIZATION  5/24/2021    Procedure: Saphenous Vein Graft;  Surgeon: Jose G Rm MD;  Location:  [facility] CATH INVASIVE LOCATION;  Service: Cardiovascular;;   • CARDIAC CATHETERIZATION N/A 5/24/2021    Procedure: Percutaneous Coronary Intervention;  Surgeon: Bernabe Zambrano MD;  Location:  [facility] CATH INVASIVE LOCATION;  Service: Cardiology;  Laterality: N/A;   • CARDIAC CATHETERIZATION N/A 5/24/2021    Procedure: Stent NIELS coronary;  Surgeon: Bernabe Zambrano MD;  Location:  [facility] CATH INVASIVE LOCATION;  Service: Cardiology;  Laterality: N/A;   • CARDIAC CATHETERIZATION N/A 5/26/2021    Procedure: Percutaneous Coronary Intervention;  Surgeon: Bernabe Zambrano MD;  Location:  [facility] CATH INVASIVE LOCATION;  Service: Cardiovascular;  Laterality: N/A;   • CARDIAC CATHETERIZATION N/A 5/26/2021    Procedure: Stent NIELS bypass graft;  Surgeon: Bernabe Zambrano MD;  Location:  [facility] CATH INVASIVE LOCATION;  Service: Cardiovascular;  Laterality: N/A;   • CARDIAC ELECTROPHYSIOLOGY PROCEDURE N/A 5/24/2021    Procedure: Temporary Pacemaker;  Surgeon: Bernabe Zambrano MD;  Location:  [facility] CATH INVASIVE LOCATION;  Service: Cardiology;  Laterality: N/A;   • CARDIAC ELECTROPHYSIOLOGY PROCEDURE N/A 5/26/2021    Procedure: Temporary Pacemaker;  Surgeon: Bernabe Zambrano MD;  Location: [facility] CATH INVASIVE LOCATION;  Service: Cardiovascular;  Laterality: N/A;   • CARPAL TUNNEL RELEASE Left 04/29/2018    carpal tunnel- lt hand// other hand surgeries    • CATARACT EXTRACTION, BILATERAL  2002    Dr. Lux Acosta   • CHOLECYSTECTOMY     • COLON RESECTION  2005   •  COLONOSCOPY N/A 5/10/2022    Procedure: COLONOSCOPY with polypectomy x3;  Surgeon: Herbie Keane MD;  Location: [facility] ENDOSCOPY;  Service: Gastroenterology;  Laterality: N/A;  colon polyps;internal hemorrhoids   • CORONARY ANGIOPLASTY     • CORONARY ANGIOPLASTY WITH STENT PLACEMENT  11/13/2015    PTCA stent to proximal in stent and mid to distal lad   • CORONARY ANGIOPLASTY WITH STENT PLACEMENT  09/16/2016    PTCA stent to mid lad and stent to vein graft to marginal   • CORONARY ARTERY BYPASS GRAFT  2005    @ [facility]   • CYST REMOVAL      cyst removed from scrotum   • FOOT SURGERY Right 07/17/2018   • FOOT SURGERY Left 02/04/2019   • PORTACATH PLACEMENT     • TOE AMPUTATION Right     right toe removed D/T infected cut that went to the bone       Family History   Problem Relation Age of Onset   • Heart disease Mother    • Heart disease Father    • Diabetes Sister    • Heart disease Sister    • Diabetes Brother    • Mental illness Brother        Social History     Tobacco Use   • Smoking status: Former Smoker     Packs/day: 1.00     Years: 60.00     Pack years: 60.00     Types: Cigarettes   • Smokeless tobacco: Never Used   • Tobacco comment: Patient quit smoking 11/2020   Vaping Use   • Vaping Use: Never used   Substance Use Topics   • Alcohol use: No   • Drug use: Yes     Frequency: 1.0 times per week     Types: Marijuana     Comment: socially        Medications Prior to Admission   Medication Sig Dispense Refill Last Dose   • albuterol sulfate  (90 Base) MCG/ACT inhaler Inhale 2 puffs Every 4 (Four) Hours.   5/6/2022 at Unknown time   • apixaban (ELIQUIS) 5 MG tablet tablet Take 1 tablet by mouth Every 12 (Twelve) Hours. Indications: Atrial Fibrillation 60 tablet  5/6/2022 at Unknown time   • atorvastatin (LIPITOR) 40 MG tablet Take 40 mg by mouth Every Night.   5/5/2022 at Unknown time   • budesonide (Pulmicort) 0.5 MG/2ML nebulizer solution Take 2 mL by nebulization 2 (Two) Times a Day.   5/6/2022 at  Unknown time   • cholecalciferol (VITAMIN D3) 25 MCG (1000 UT) tablet Take 1,000 Units by mouth Daily.   5/6/2022 at Unknown time   • docusate sodium (COLACE) 100 MG capsule Take 100 mg by mouth Daily.   5/6/2022 at Unknown time   • donepezil (ARICEPT) 10 MG tablet Take 10 mg by mouth Every Night.   5/5/2022 at Unknown time   • fluticasone (FLONASE) 50 MCG/ACT nasal spray 2 sprays by Each Nare route 2 (Two) Times a Day.   5/6/2022 at Unknown time   • guaiFENesin (MUCINEX) 600 MG 12 hr tablet Take 600 mg by mouth 2 (Two) Times a Day.   5/6/2022 at Unknown time   • HYDROcodone-acetaminophen (NORCO) 5-325 MG per tablet Take 1 tablet by mouth 3 (Three) Times a Day.   5/6/2022 at Unknown time   • ipratropium-albuterol (DUO-NEB) 0.5-2.5 mg/3 ml nebulizer Take 3 mL by nebulization 3 (Three) Times a Day. 360 mL  5/6/2022 at Unknown time   • ipratropium-albuterol (DUO-NEB) 0.5-2.5 mg/3 ml nebulizer Take 3 mL by nebulization Every 4 (Four) Hours As Needed for Wheezing. 360 mL  5/6/2022 at Unknown time   • levothyroxine (SYNTHROID, LEVOTHROID) 50 MCG tablet Take 50 mcg by mouth Every Morning.   5/6/2022 at Unknown time   • Metoprolol Tartrate 37.5 MG tablet Take 37.5 mg by mouth 2 (Two) Times a Day. Hold for SBP <110 or HR < 60   5/6/2022 at Unknown time   • midodrine (PROAMATINE) 10 MG tablet Take 10 mg by mouth 3 (Three) Times a Day Before Meals. Hold for BP > 140/90   5/6/2022 at Unknown time   • omeprazole (priLOSEC) 20 MG capsule Take 20 mg by mouth Daily.   5/6/2022 at Unknown time   • polyethylene glycol (MiraLax) 17 g packet Take 17 g by mouth 2 (Two) Times a Day.   5/6/2022 at Unknown time   • sertraline (ZOLOFT) 50 MG tablet Take 50 mg by mouth Daily.   5/6/2022 at Unknown time   • vitamin B-12 (CYANOCOBALAMIN) 1000 MCG tablet Take 1,000 mcg by mouth Daily.   5/6/2022 at Unknown time   • bisacodyl (DULCOLAX) 10 MG suppository Insert 10 mg into the rectum Daily As Needed for Constipation.   Unknown at Unknown time   •  magnesium hydroxide (MILK OF MAGNESIA) 400 MG/5ML suspension Take 30 mL by mouth Daily As Needed for Constipation.   Unknown at Unknown time   • ondansetron (ZOFRAN) 4 MG tablet Take 4 mg by mouth Every 8 (Eight) Hours As Needed for Nausea or Vomiting.   Unknown at Unknown time   • phenylephrine-mineral oil-petrolatum (Preparation H) 0.25-14-74.9 % ointment hemorrhoidal ointment Insert 1 application into the rectum Daily As Needed.   Unknown at Unknown time       Allergies  Codeine    Scheduled Meds:amiodarone, 200 mg, Oral, Q24H  atorvastatin, 40 mg, Oral, Nightly  budesonide, 0.5 mg, Nebulization, BID  cefTRIAXone, 2 g, Intravenous, Q24H  donepezil, 10 mg, Oral, Nightly  enoxaparin, 1 mg/kg, Subcutaneous, Q24H  insulin lispro, 0-7 Units, Subcutaneous, TID AC  ipratropium-albuterol, 3 mL, Nebulization, TID - RT  levothyroxine, 50 mcg, Oral, Q AM  lidocaine, 20 mL, Intradermal, Once  metoprolol tartrate, 25 mg, Oral, BID  midodrine, 15 mg, Oral, TID AC  pantoprazole, 40 mg, Oral, QAM  polyethylene glycol, 17 g, Oral, BID  sertraline, 50 mg, Oral, Daily  sodium chloride, 10 mL, Intravenous, Q12H  sodium chloride, 10 mL, Intravenous, Q12H  sorbitol, 50 mL, Oral, Daily      Continuous Infusions:   PRN Meds:.•  acetaminophen **OR** acetaminophen **OR** acetaminophen  •  aluminum-magnesium hydroxide-simethicone  •  dextrose  •  dextrose  •  glucagon (human recombinant)  •  insulin lispro **AND** insulin lispro  •  ipratropium-albuterol  •  melatonin  •  Morphine  •  ondansetron **OR** ondansetron  •  oxyCODONE  •  sodium chloride  •  sodium chloride  •  sodium chloride    Objective     VITAL SIGNS  Vitals:    05/12/22 0200 05/12/22 0300 05/12/22 0330 05/12/22 0400   BP:       BP Location:       Patient Position:       Pulse: 62 61 66 60   Resp:       Temp:    97.8 °F (36.6 °C)   TempSrc:    Oral   SpO2: 100% 100% 99% 100%   Weight:    102 kg (223 lb 12.8 oz)   Height:           Flowsheet Rows    Flowsheet Row First Filed  "Value   Admission Height 177.8 cm (70\") Documented at 05/06/2022 1026   Admission Weight 104 kg (229 lb 0.9 oz) Documented at 05/06/2022 1026            Intake/Output Summary (Last 24 hours) at 5/12/2022 0605  Last data filed at 5/12/2022 0500  Gross per 24 hour   Intake 1094 ml   Output 1500 ml   Net -406 ml        TELEMETRY:    Physical Exam:  The patient is alert, oriented and in no distress.  Vital signs as noted above.  Exogenous obesity.  Head and neck revealed no carotid bruits or jugular venous distention.  No thyromegaly or lymphadenopathy is present  Lungs clear.  No wheezing.  Breath sounds are normal bilaterally.  Heart normal first and second heart sounds.  No murmur. No precordial rub is present.  No gallop is present.  Abdomen soft and nontender.  No organomegaly is present.  Extremities with good peripheral pulses without any pedal edema.  Skin warm and dry.  Musculoskeletal system is grossly normal  CNS grossly normal      Results Review:   I reviewed the patient's new clinical results.  Lab Results (last 24 hours)     Procedure Component Value Units Date/Time    Comprehensive Metabolic Panel [[id number]]  (Abnormal) Collected: 05/12/22 0501    Specimen: Blood Updated: 05/12/22 0544     Glucose 122 mg/dL      BUN 12 mg/dL      Creatinine 2.48 mg/dL      Sodium 133 mmol/L      Potassium 4.2 mmol/L      Chloride 100 mmol/L      CO2 23.0 mmol/L      Calcium 8.1 mg/dL      Total Protein 5.4 g/dL      Albumin 3.00 g/dL      ALT (SGPT) 7 U/L      AST (SGOT) 12 U/L      Alkaline Phosphatase 45 U/L      Total Bilirubin 0.3 mg/dL      Globulin 2.4 gm/dL      A/G Ratio 1.3 g/dL      BUN/Creatinine Ratio 4.8     Anion Gap 10.0 mmol/L      eGFR 27.1 mL/min/1.73      Comment: National Kidney Foundation and American Society of Nephrology (ASN) Task Force recommended calculation based on the Chronic Kidney Disease Epidemiology Collaboration (CKD-EPI) equation refit without adjustment for race.       Narrative:      " GFR Normal >60  Chronic Kidney Disease <60  Kidney Failure <15      Phosphorus [[id number]]  (Abnormal) Collected: 05/12/22 0501    Specimen: Blood Updated: 05/12/22 0544     Phosphorus 2.0 mg/dL     Magnesium [[id number]]  (Normal) Collected: 05/12/22 0501    Specimen: Blood Updated: 05/12/22 0536     Magnesium 1.9 mg/dL     CBC & Differential [[id number]]  (Abnormal) Collected: 05/12/22 0501    Specimen: Blood Updated: 05/12/22 0508    Narrative:      The following orders were created for panel order CBC & Differential.  Procedure                               Abnormality         Status                     ---------                               -----------         ------                     CBC Auto Differential[[id number]]        Abnormal            Final result                 Please view results for these tests on the individual orders.    CBC Auto Differential [[id number]]  (Abnormal) Collected: 05/12/22 0501    Specimen: Blood Updated: 05/12/22 0508     WBC 7.20 10*3/mm3      RBC 3.47 10*6/mm3      Hemoglobin 10.3 g/dL      Hematocrit 32.7 %      MCV 94.2 fL      MCH 29.8 pg      MCHC 31.6 g/dL      RDW 17.2 %      RDW-SD 58.2 fl      MPV 7.4 fL      Platelets 208 10*3/mm3      Neutrophil % 65.0 %      Lymphocyte % 22.0 %      Monocyte % 10.9 %      Eosinophil % 1.2 %      Basophil % 0.9 %      Neutrophils, Absolute 4.70 10*3/mm3      Lymphocytes, Absolute 1.60 10*3/mm3      Monocytes, Absolute 0.80 10*3/mm3      Eosinophils, Absolute 0.10 10*3/mm3      Basophils, Absolute 0.10 10*3/mm3      nRBC 0.1 /100 WBC     POC Glucose Once [[id number]]  (Abnormal) Collected: 05/11/22 1712    Specimen: Blood Updated: 05/11/22 1713     Glucose 110 mg/dL      Comment: Serial Number: [id number]:  [id number]       Blood Culture - Blood, Hand, Right [[id number]]  (Normal) Collected: 05/07/22 1519    Specimen: Blood from Hand, Right Updated: 05/11/22 1617     Blood Culture No growth at 4 days    POC Glucose Once [[id number]]   (Normal) Collected: 05/11/22 1118    Specimen: Blood Updated: 05/11/22 1120     Glucose 77 mg/dL      Comment: Serial Number: [id number]:  [id number]       SKYE AURIS SCREEN - Swab, Axilla Right, Axilla Left and Groin [[id number]]  (Normal) Collected: 05/07/22 1020    Specimen: Swab from Axilla Right, Axilla Left and Groin Updated: 05/11/22 1032     Candida Auris Screen Culture No Candida auris isolated at 4 days    Tissue Pathology Exam [[id number]] Collected: 05/10/22 1402    Specimen: Polyp from Large Intestine, Left / Descending Colon; Polyp from Large Intestine, Right / Ascending Colon Updated: 05/11/22 0938    POC Glucose Once [[id number]]  (Normal) Collected: 05/11/22 0808    Specimen: Blood Updated: 05/11/22 0810     Glucose 104 mg/dL      Comment: Serial Number: [id number]:  [id number]       MRSA Screen, PCR (Inpatient) - Swab, Nares [[id number]]  (Normal) Collected: 05/11/22 0557    Specimen: Swab from Nares Updated: 05/11/22 0804     MRSA PCR No MRSA Detected    Comprehensive Metabolic Panel [[id number]]  (Abnormal) Collected: 05/11/22 0555    Specimen: Blood Updated: 05/11/22 0633     Glucose 167 mg/dL      BUN 21 mg/dL      Creatinine 3.41 mg/dL      Sodium 133 mmol/L      Potassium 3.2 mmol/L      Chloride 97 mmol/L      CO2 24.0 mmol/L      Calcium 8.0 mg/dL      Total Protein 5.3 g/dL      Albumin 2.80 g/dL      ALT (SGPT) 8 U/L      AST (SGOT) 10 U/L      Alkaline Phosphatase 46 U/L      Total Bilirubin 0.3 mg/dL      Globulin 2.5 gm/dL      A/G Ratio 1.1 g/dL      BUN/Creatinine Ratio 6.2     Anion Gap 12.0 mmol/L      eGFR 18.5 mL/min/1.73      Comment: National Kidney Foundation and American Society of Nephrology (ASN) Task Force recommended calculation based on the Chronic Kidney Disease Epidemiology Collaboration (CKD-EPI) equation refit without adjustment for race.       Narrative:      GFR Normal >60  Chronic Kidney Disease <60  Kidney Failure <15      Lipid Panel [[id number]]  Collected: 05/11/22 0555    Specimen: Blood Updated: 05/11/22 0633     Total Cholesterol 117 mg/dL      Triglycerides 98 mg/dL      HDL Cholesterol 47 mg/dL      LDL Cholesterol  52 mg/dL      VLDL Cholesterol 18 mg/dL      LDL/HDL Ratio 1.07    Narrative:      Cholesterol Reference Ranges  (U.S. Department of Health and Human Services ATP III Classifications)    Desirable          <200 mg/dL  Borderline High    200-239 mg/dL  High Risk          >240 mg/dL      Triglyceride Reference Ranges  (U.S. Department of Health and Human Services ATP III Classifications)    Normal           <150 mg/dL  Borderline High  150-199 mg/dL  High             200-499 mg/dL  Very High        >500 mg/dL    HDL Reference Ranges  (U.S. Department of Health and Human Services ATP III Classifications)    Low     <40 mg/dl (major risk factor for CHD)  High    >60 mg/dl ('negative' risk factor for CHD)        LDL Reference Ranges  (U.S. Department of Health and Human Services ATP III Classifications)    Optimal          <100 mg/dL  Near Optimal     100-129 mg/dL  Borderline High  130-159 mg/dL  High             160-189 mg/dL  Very High        >189 mg/dL    Phosphorus [[id number]]  (Normal) Collected: 05/11/22 0555    Specimen: Blood Updated: 05/11/22 0632     Phosphorus 2.7 mg/dL     Magnesium [[id number]]  (Normal) Collected: 05/11/22 0555    Specimen: Blood Updated: 05/11/22 0632     Magnesium 2.1 mg/dL     Procalcitonin [[id number]]  (Abnormal) Collected: 05/11/22 0555    Specimen: Blood Updated: 05/11/22 0631     Procalcitonin 0.27 ng/mL     Narrative:      As a Marker for Sepsis (Non-Neonates):    1. <0.5 ng/mL represents a low risk of severe sepsis and/or septic shock.  2. >2 ng/mL represents a high risk of severe sepsis and/or septic shock.    As a Marker for Lower Respiratory Tract Infections that require antibiotic therapy:    PCT on Admission    Antibiotic Therapy       6-12 Hrs later    >0.5                Strongly Recommended  >0.25  "- <0.5        Recommended   0.1 - 0.25          Discouraged              Remeasure/reassess PCT  <0.1                Strongly Discouraged     Remeasure/reassess PCT    As 28 day mortality risk marker: \"Change in Procalcitonin Result\" (>80% or <=80%) if Day 0 (or Day 1) and Day 4 values are available. Refer to http://www.[facility]-pct-calculator.com    Change in PCT <=80%  A decrease of PCT levels below or equal to 80% defines a positive change in PCT test result representing a higher risk for 28-day all-cause mortality of patients diagnosed with severe sepsis for septic shock.    Change in PCT >80%  A decrease of PCT levels of more than 80% defines a negative change in PCT result representing a lower risk for 28-day all-cause mortality of patients diagnosed with severe sepsis or septic shock.       Lactic Acid, Plasma [[id number]]  (Normal) Collected: 05/11/22 0555    Specimen: Blood Updated: 05/11/22 0630     Lactate 1.4 mmol/L     CBC & Differential [[id number]]  (Abnormal) Collected: 05/11/22 0144    Specimen: Blood Updated: 05/11/22 0616    Narrative:      The following orders were created for panel order CBC & Differential.  Procedure                               Abnormality         Status                     ---------                               -----------         ------                     CBC Auto Differential[[id number]]        Abnormal            Final result               Scan Slide[[id number]]                                                                    Please view results for these tests on the individual orders.    CBC Auto Differential [[id number]]  (Abnormal) Collected: 05/11/22 0555    Specimen: Blood Updated: 05/11/22 0616     WBC 11.20 10*3/mm3      RBC 3.71 10*6/mm3      Hemoglobin 10.5 g/dL      Hematocrit 34.9 %      MCV 94.0 fL      MCH 28.3 pg      MCHC 30.1 g/dL      RDW 16.9 %      RDW-SD 56.4 fl      MPV 7.5 fL      Platelets 234 10*3/mm3      Neutrophil % 85.5 %      Lymphocyte % 6.6 %  "     Monocyte % 7.2 %      Eosinophil % 0.2 %      Basophil % 0.5 %      Neutrophils, Absolute 9.60 10*3/mm3      Lymphocytes, Absolute 0.70 10*3/mm3      Monocytes, Absolute 0.80 10*3/mm3      Eosinophils, Absolute 0.00 10*3/mm3      Basophils, Absolute 0.10 10*3/mm3      nRBC 0.0 /100 WBC           Imaging Results (Last 24 Hours)     Procedure Component Value Units Date/Time    XR Chest 1 View [[id number]] Collected: 05/11/22 0738     Updated: 05/11/22 0741    Narrative:      DATE OF EXAM:  5/11/2022 4:44 AM     PROCEDURE:  XR CHEST 1 VW-     INDICATIONS:  insertion of central venous catheter; N18.6-End stage renal disease;  Z99.2-Dependence on renal dialysis; R06.00-Dyspnea, unspecified;  Z91.15-Patient's noncompliance with renal dialysis; R44-Aasmkhy  effusion, not elsewhere classified; R78.81-Bacteremia; 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     FINDINGS:  Interval placement of a left IJ central venous line with the tip in the  cavoatrial junction region. No pneumothorax. Right IJ dialysis catheter  remains in place. Cardiomegaly with coronary artery stent again  demonstrated. Haziness of the lungs with indistinct vessels and right  larger than left pleural effusions, not significantly changed        Impression:         1. No pneumothorax status post central venous line placement  2. Stable appearance of pulmonary edema and bilateral pleural effusions     Electronically Signed By-Norman Paige On:5/11/2022 7:39 AM  This report was finalized on [id number] by  Norman Paige, .      LAB RESULTS (LAST 7 DAYS)    CBC  Results from last 7 days   Lab Units 05/12/22  0501 05/11/22  0555 05/11/22  0253 05/10/22  0706 05/09/22  0528 05/08/22  0446 05/07/22  0417 05/06/22  1045   WBC 10*3/mm3 7.20 11.20*  --   6.70 6.90 6.10 6.70 5.20   RBC 10*6/mm3 3.47* 3.71*  --  4.14 3.65* 3.68* 3.69* 3.78*   HEMOGLOBIN g/dL 10.3* 10.5*  --  11.9* 10.8* 10.9* 10.9* 11.0*   HEMOGLOBIN, POC g/dL  --   --  13.4  --   --   --   --   --    HEMATOCRIT % 32.7* 34.9*  --  39.4 34.6* 35.8* 35.7* 35.8*   HEMATOCRIT POC %  --   --  39  --   --   --   --   --    MCV fL 94.2 94.0  --  95.2 94.7 97.2* 96.6 94.5   PLATELETS 10*3/mm3 208 234  --  213 200 173 163 203       BMP  Results from last 7 days   Lab Units 05/12/22  0501 05/11/22  0555 05/10/22  0706 05/09/22 0228 05/08/22 0446 05/07/22 0417 05/06/22  1045   SODIUM mmol/L 133* 133* 135* 130* 133* 134* 138   POTASSIUM mmol/L 4.2 3.2* 4.1 4.0 4.0 3.9 3.8   CHLORIDE mmol/L 100 97* 97* 93* 97* 97* 95*   CO2 mmol/L 23.0 24.0 24.0 24.0 25.0 26.0 30.0*   BUN mg/dL 12 21 17 24* 19 12 15   CREATININE mg/dL 2.48* 3.41* 2.86* 3.39* 2.94* 2.24* 2.74*   GLUCOSE mg/dL 122* 167* 132* 133* 116* 164* 99   MAGNESIUM mg/dL 1.9 2.1  --  1.9 2.0  --   --    PHOSPHORUS mg/dL 2.0* 2.7  --  2.2* 2.2*  --   --        CMP   Results from last 7 days   Lab Units 05/12/22  0501 05/11/22  0555 05/10/22  0706 05/09/22 0228 05/08/22 0446 05/07/22 0417 05/06/22  1045   SODIUM mmol/L 133* 133* 135* 130* 133* 134* 138   POTASSIUM mmol/L 4.2 3.2* 4.1 4.0 4.0 3.9 3.8   CHLORIDE mmol/L 100 97* 97* 93* 97* 97* 95*   CO2 mmol/L 23.0 24.0 24.0 24.0 25.0 26.0 30.0*   BUN mg/dL 12 21 17 24* 19 12 15   CREATININE mg/dL 2.48* 3.41* 2.86* 3.39* 2.94* 2.24* 2.74*   GLUCOSE mg/dL 122* 167* 132* 133* 116* 164* 99   ALBUMIN g/dL 3.00* 2.80* 2.90*  --  2.60*  --  3.10*   BILIRUBIN mg/dL 0.3 0.3 0.4  --  0.4  --  0.7   ALK PHOS U/L 45 46 56  --  52  --  59   AST (SGOT) U/L 12 10 12  --  12  --  11   ALT (SGPT) U/L 7 8 6  --  5  --  5         BNP        TROPONIN  Results from last 7 days   Lab Units 05/06/22  1828   TROPONIN T ng/mL 0.311*       CoAg  Results from last 7 days   Lab Units 05/10/22  0706   INR  1.09       Creatinine  Clearance  Estimated Creatinine Clearance: 32.7 mL/min (A) (by C-G formula based on SCr of 2.48 mg/dL (H)).    ABG  Results from last 7 days   Lab Units 05/11/22  0253   PH, ARTERIAL pH units 7.362   PCO2, ARTERIAL mm Hg 47.0   PO2 ART mm Hg 100.1   O2 SATURATION ART % 97.4   BASE EXCESS ART mmol/L 0.7       Radiology  XR Chest 1 View    Result Date: 5/11/2022   1. No pneumothorax status post central venous line placement 2. Stable appearance of pulmonary edema and bilateral pleural effusions  Electronically Signed By-Norman Paige On:5/11/2022 7:39 AM This report was finalized on [id number] by  Norman Paige, .    XR Chest 1 View    Result Date: 5/11/2022  Midline sternotomy wires are present. Right dual-lumen central venous catheter is unchanged. The cardiac silhouette is moderately to significantly enlarged and there is worsening interstitial and airspace changes bilaterally which is likely related to a component of edema. There also appears to be moderate bilateral pleural effusions which are very similar to the previous examination. Superimposed infection would be difficult to exclude. Electronically signed by:  Sal Nieto D.O.  5/11/2022 12:08 AM          EKG                      I personally viewed and interpreted the patient's EKG/Telemetry data:    ECHOCARDIOGRAM:    Results for orders placed during the hospital encounter of 05/06/22    Adult Transthoracic Echo Complete w/ Color, Spectral and Contrast if Necessary Per Protocol    Interpretation Summary  Severe global left ventricular hypokinesis, estimated LV ejection fraction of 20%.  Mild right ventricular enlargement  Moderate left atrial enlargement  Aortic valve not well visualized in short axis.  Dopplers do not reveal any abnormality but  Mitral valve, tricuspid valve appears structurally normal, while mitral and tricuspid regurgitation seen.  Calculated RV systolic pressure of 30 mmHg  No pericardial effusion seen.  Proximal aorta appears  normal in size.          STRESS MYOVIEW:    Cardiolite (Tc-99m Sestamibi) stress test    CARDIAC CATHETERIZATION:            OTHER:         Assessment & Plan     Principal Problem:    Chronic systolic (congestive) heart failure (HCC)  Active Problems:    S/P CABG (coronary artery bypass graft)    Primary hypertension    Elevated troponin    ANNETTE treated with BiPAP    Major depressive disorder, recurrent, mild (HCC)    Mixed hyperlipidemia    Flaccid hemiplegia affecting right dominant side (HCC)    Diabetic neuropathy (HCC)    Chronic obstructive pulmonary disease with (acute) exacerbation (HCC)    Anemia of renal disease    End stage renal disease ([facility])    Chronic respiratory failure with hypoxia, on home oxygen therapy ([facility])    Other specified hypothyroidism    Fluid overload    Stage 5 chronic kidney disease on chronic dialysis ([facility])    Bacteremia    Normocytic anemia due to blood loss    Cardiopulmonary arrest with successful resuscitation ([facility])    Ventricular fibrillation ([facility])      [[[[[[[[[[[[[[[[[[[[[[[[[    Impression  ==============     -Shortness of breath     - Positive blood cultures for Streptococcus 2 out of 2 sets.  Rule out tunneled catheter infection.     -Acute on chronic fluid overload.      -status post CABG 2005. status post stent to LAD 2009    Status post stent to LAD 11/13/2015  Status post stent to mid LAD and SVG to marginal branch 09/16/2016.  Status post stent to mid LAD 5/24/2021  Status post stent to SVG to RCA 5/26/2021     Cardiac catheterization 5/24/2021 revealed  Left ventricle angiogram was not performed due to pre-existing renal dysfunction.  Left main coronary artery normal.  Left anterior descending artery has mid segment lengthy 90% disease within the previously placed stent.  Distal left into descending artery has diffuse disease.  Circumflex coronary artery is totally occluded.  (Chronic)  Right coronary artery is chronically occluded.  SVG to marginal branch (jump graft to  OM1 and OM 2) is totally occluded (new finding compared to 2015.  SVG to PDA has distal radiolucency.  However no definite obstructive disease is present.       -status post myocardial infarction 2000 and 2002 .      -history of intermittent but mostly persistent atrial fibrillation     -Acute on chronic congestive heart failure.  Compensated at this time.     Recent echocardiogram showed left ventricle dysfunction with ejection fraction of 35%.     -History of right-sided weakness with left MCA territory stroke.-Improved     Transesophageal echocardiogram 11/30/2020.  Biatrial enlargement.  Smoking effect in the left atrium and left ventricle and left atrial appendage.  Mild mitral and aortic regurgitation.  Left ventricle enlargement with diffuse hypocontractility with ejection fraction of 35 to 40%.     Echocardiogram 11/27/2020 revealed left atrial enlargement left ventricle dysfunction with ejection fraction of 40%.  Negative bubble study.      -diabetes hypertension and sleep apnea.  ESRD.     -status post colon surgery (partial colectomy) appendectomy cholecystectomy right 4th toe removal and carpal tunnel surgery      -continued smoking 1 pack per day -abstinence from smoking      -allergy to codeine.  ============   Plan  ================  Shortness of breath  Positive blood cultures for Streptococcus 2 out of 2 sets.  Rule out tunneled catheter infection..  Review echocardiogram     Status post CABG  Patient is not having any angina pectoris or congestive heart failure     Atrial fibrillation-chronic  Rate is better controlled  Patient is on Coreg at 12.5 mg p.o. twice a day amiodarone and Cardizem.      ESRD  Patient is undergoing dialysis.      Status post stent to LAD 5/24/2021.  Status post stent to SVG to RCA 5/26/2021.    Hypokalemia  k 3.2-supplements.       Anticoagulation  Patient was on Eliquis 5 mg twice a day.  Observe for toxic effects.  Eliquis is on hold     Echocardiogram 3/12/2022 revealed  ejection fraction of 25 to 30%.    Patient had CODE BLUE yesterday.  Patient apparently was in V. fib and was defibrillated.  Patient was apparently below and unconscious.  Patient gradually improved his mentation and patient was not intubated.  Patient apparently continued to be oxygenated well.  Amiodarone was ordered.  Patient was transferred to ICU.     Consider biventricular ICD if left ventricular function does not improve or congestive heart failure gets worse.    Staphylococcal infection    In view of recent episode of ventricular fibrillation etc. patient would benefit from cardiac catheterization and coronary arteriography.  Risks and benefits pros and cons of the procedure including infection bleeding blood clot heart attack stroke allergic reaction to the dye renal dysfunction etc. were discussed.    Have discussed with attending nurse for coordination of care.    Patient is scheduled to have cardiac catheter tomorrow afternoon.    Follow-up labs ordered.     Further plan will depend on patient's progress.   ]]]]]]]]]]]]]]]]]]]]]]              Jose G Rm MD  05/12/22  06:05 EDT

## 2022-05-12 NOTE — PROGRESS NOTES
Infectious Diseases Progress Note      LOS: 6 days   Patient Care Team:  Rudolph Rooney MD as PCP - General (Family Medicine)  Samantha Zabala APRN as PCP - Family Medicine  Jose G Rm MD as Consulting Physician (Cardiology)    Chief Complaint: Shortness of breath    Subjective       The patient has been afebrile for the last 24 hours.  The patient is hemodynamically stable requiring no vasopressors.  He is currently on 3 L of oxygen via nasal cannula      Review of Systems:   Review of Systems   Constitutional: Positive for fatigue.   HENT: Negative.    Eyes: Negative.    Respiratory: Positive for shortness of breath.    Cardiovascular: Negative.    Gastrointestinal: Negative.    Endocrine: Negative.    Genitourinary: Negative.    Musculoskeletal: Negative.    Skin: Negative.    Neurological: Negative.    Psychiatric/Behavioral: Negative.    All other systems reviewed and are negative.       Objective     Vital Signs  Temp:  [97.3 °F (36.3 °C)-98.7 °F (37.1 °C)] 97.6 °F (36.4 °C)  Heart Rate:  [56-78] 64  Resp:  [12-16] 16  BP: ()/(39-83) 113/43  Arterial Line BP: ()/(30-81) 106/43    Physical Exam:  Physical Exam  Vitals and nursing note reviewed.   Constitutional:       General: He is not in acute distress.     Appearance: Normal appearance. He is well-developed and normal weight. He is not diaphoretic.   HENT:      Head: Normocephalic and atraumatic.   Eyes:      Conjunctiva/sclera: Conjunctivae normal.      Pupils: Pupils are equal, round, and reactive to light.   Cardiovascular:      Rate and Rhythm: Normal rate and regular rhythm.      Heart sounds: Normal heart sounds, S1 normal and S2 normal.   Pulmonary:      Effort: Pulmonary effort is normal. No respiratory distress.      Breath sounds: No stridor. Rales present. No wheezing.   Abdominal:      General: Bowel sounds are normal. There is no distension.      Palpations: Abdomen is soft. There is no mass.      Tenderness: There is no  abdominal tenderness. There is no guarding.   Musculoskeletal:         General: No deformity. Normal range of motion.      Cervical back: Neck supple.   Skin:     General: Skin is warm and dry.      Coloration: Skin is not pale.      Findings: No erythema or rash.   Neurological:      Mental Status: He is alert and oriented to person, place, and time.      Cranial Nerves: No cranial nerve deficit.   Psychiatric:         Mood and Affect: Mood normal.          Results Review:    I have reviewed all clinical data, test, lab, and imaging results.     Radiology  No Radiology Exams Resulted Within Past 24 Hours    Cardiology    Laboratory    Results from last 7 days   Lab Units 05/12/22  0501 05/11/22  0555 05/11/22  0253 05/10/22  0706 05/09/22  0528 05/08/22 0446 05/07/22 0417 05/06/22  1045   WBC 10*3/mm3 7.20 11.20*  --  6.70 6.90 6.10 6.70 5.20   HEMOGLOBIN g/dL 10.3* 10.5*  --  11.9* 10.8* 10.9* 10.9* 11.0*   HEMOGLOBIN, POC g/dL  --   --  13.4  --   --   --   --   --    HEMATOCRIT % 32.7* 34.9*  --  39.4 34.6* 35.8* 35.7* 35.8*   HEMATOCRIT POC %  --   --  39  --   --   --   --   --    PLATELETS 10*3/mm3 208 234  --  213 200 173 163 203     Results from last 7 days   Lab Units 05/12/22  0501 05/11/22  0555 05/10/22  0706 05/09/22  0228 05/08/22 0446 05/07/22  0417 05/06/22  1045   SODIUM mmol/L 133* 133* 135* 130* 133* 134* 138   POTASSIUM mmol/L 4.2 3.2* 4.1 4.0 4.0 3.9 3.8   CHLORIDE mmol/L 100 97* 97* 93* 97* 97* 95*   CO2 mmol/L 23.0 24.0 24.0 24.0 25.0 26.0 30.0*   BUN mg/dL 12 21 17 24* 19 12 15   CREATININE mg/dL 2.48* 3.41* 2.86* 3.39* 2.94* 2.24* 2.74*   GLUCOSE mg/dL 122* 167* 132* 133* 116* 164* 99   ALBUMIN g/dL 3.00* 2.80* 2.90*  --  2.60*  --  3.10*   BILIRUBIN mg/dL 0.3 0.3 0.4  --  0.4  --  0.7   ALK PHOS U/L 45 46 56  --  52  --  59   AST (SGOT) U/L 12 10 12  --  12  --  11   ALT (SGPT) U/L 7 8 6  --  5  --  5   CALCIUM mg/dL 8.1* 8.0* 8.7 7.9* 8.1* 8.1* 8.1*                  Microbiology   Microbiology Results (last 10 days)     Procedure Component Value - Date/Time    MRSA Screen, PCR (Inpatient) - Swab, Nares [[id number]]  (Normal) Collected: 05/11/22 0557    Lab Status: Final result Specimen: Swab from Nares Updated: 05/11/22 0804     MRSA PCR No MRSA Detected    COVID PRE-OP / PRE-PROCEDURE SCREENING ORDER (NO ISOLATION) - Swab, Nasopharynx [[id number]]  (Normal) Collected: 05/10/22 0635    Lab Status: Final result Specimen: Swab from Nasopharynx Updated: 05/10/22 1143    Narrative:      The following orders were created for panel order COVID PRE-OP / PRE-PROCEDURE SCREENING ORDER (NO ISOLATION) - Swab, Nasopharynx.  Procedure                               Abnormality         Status                     ---------                               -----------         ------                     COVID-19,CEPHEID/ROHAN,CO...[[id number]]  Normal              Final result                 Please view results for these tests on the individual orders.    COVID-19,CEPHEID/ROHAN,COR/ZEYNEP/PAD/BEATRICE IN-HOUSE(OR EMERGENT/ADD-ON),NP SWAB IN TRANSPORT MEDIA 3-4 HR TAT, RT-PCR - Swab, Nasopharynx [[id number]]  (Normal) Collected: 05/10/22 0635    Lab Status: Final result Specimen: Swab from Nasopharynx Updated: 05/10/22 1143     COVID19 Not Detected    Narrative:      Fact sheet for providers: https://www.fda.gov/media/011554/download     Fact sheet for patients: https://www.fda.gov/media/364136/download  Fact sheet for providers: https://www.fda.gov/media/639054/download     Fact sheet for patients: https://www.fda.gov/media/191358/download    Blood Culture - Blood, Hand, Right [[id number]]  (Normal) Collected: 05/07/22 1519    Lab Status: Preliminary result Specimen: Blood from Hand, Right Updated: 05/11/22 1617     Blood Culture No growth at 4 days    CANDIDA AURIS SCREEN - Swab, Axilla Right, Axilla Left and Groin [[id number]]  (Normal) Collected: 05/07/22 1020    Lab Status: Final result Specimen: Swab  from Axilla Right, Axilla Left and Groin Updated: 05/12/22 0739     Candida Auris Screen Culture No Candida auris isolated at 5 days    COVID PRE-OP / PRE-PROCEDURE SCREENING ORDER (NO ISOLATION) - Swab, Nasopharynx [[id number]]  (Normal) Collected: 05/06/22 1519    Lab Status: Final result Specimen: Swab from Nasopharynx Updated: 05/06/22 1542    Narrative:      The following orders were created for panel order COVID PRE-OP / PRE-PROCEDURE SCREENING ORDER (NO ISOLATION) - Swab, Nasopharynx.  Procedure                               Abnormality         Status                     ---------                               -----------         ------                     COVID-19,CEPHEID/ROHAN,CO...[[id number]]  Normal              Final result                 Please view results for these tests on the individual orders.    COVID-19,CEPHEID/ROHAN,COR/ZEYNEP/PAD/BEATRICE IN-HOUSE(OR EMERGENT/ADD-ON),NP SWAB IN TRANSPORT MEDIA 3-4 HR TAT, RT-PCR - Swab, Nasopharynx [[id number]]  (Normal) Collected: 05/06/22 1519    Lab Status: Final result Specimen: Swab from Nasopharynx Updated: 05/06/22 1542     COVID19 Not Detected    Narrative:      Fact sheet for providers: https://www.fda.gov/media/466219/download     Fact sheet for patients: https://www.fda.gov/media/274167/download  Fact sheet for providers: https://www.fda.gov/media/950248/download    Fact sheet for patients: https://www.fda.gov/media/274910/download    Test performed by PCR.    Blood Culture - Blood, Hand, Right [[id number]]  (Abnormal)  (Susceptibility) Collected: 05/06/22 1116    Lab Status: Final result Specimen: Blood from Hand, Right Updated: 05/09/22 0703     Blood Culture Streptococcus gallolyticus ssp pasteurianus     Isolated from Aerobic and Anaerobic Bottles     Gram Stain Anaerobic Bottle Gram positive cocci      Aerobic Bottle Gram positive cocci    Susceptibility      Streptococcus gallolyticus ssp pasteurianus      MONROE      Ceftriaxone Susceptible      Penicillin G  Susceptible      Vancomycin Susceptible                           Blood Culture ID, PCR - Blood, Hand, Right [[id number]]  (Abnormal) Collected: 05/06/22 1116    Lab Status: Final result Specimen: Blood from Hand, Right Updated: 05/07/22 0216     BCID, PCR Streptococcus spp, not A, B, or pneumonia. Identification by BCID2 PCR.     BOTTLE TYPE Anaerobic Bottle    Blood Culture - Blood, Arm, Right [[id number]]  (Abnormal) Collected: 05/06/22 1057    Lab Status: Final result Specimen: Blood from Arm, Right Updated: 05/09/22 0704     Blood Culture Streptococcus gallolyticus ssp pasteurianus     Isolated from Aerobic and Anaerobic Bottles     Gram Stain Anaerobic Bottle Gram positive cocci      Aerobic Bottle Gram positive cocci    Narrative:      Refer to previous blood culture collected on 5/6/2022 1116 for MICs.            Medication Review:       Schedule Meds  amiodarone, 200 mg, Oral, Q24H  atorvastatin, 40 mg, Oral, Nightly  budesonide, 0.5 mg, Nebulization, BID  cefTRIAXone, 2 g, Intravenous, Q24H  donepezil, 10 mg, Oral, Nightly  enoxaparin, 1 mg/kg, Subcutaneous, Q24H  insulin lispro, 0-7 Units, Subcutaneous, TID AC  ipratropium-albuterol, 3 mL, Nebulization, TID - RT  levothyroxine, 50 mcg, Oral, Q AM  lidocaine, 20 mL, Intradermal, Once  metoprolol tartrate, 25 mg, Oral, BID  midodrine, 15 mg, Oral, TID AC  pantoprazole, 40 mg, Oral, QAM  polyethylene glycol, 17 g, Oral, BID  sertraline, 50 mg, Oral, Daily  sodium chloride, 10 mL, Intravenous, Q12H  sodium chloride, 10 mL, Intravenous, Q12H  sodium phosphate IVPB, 15 mmol, Intravenous, Once  sorbitol, 50 mL, Oral, Daily        Infusion Meds       PRN Meds  •  acetaminophen **OR** acetaminophen **OR** acetaminophen  •  aluminum-magnesium hydroxide-simethicone  •  dextrose  •  dextrose  •  glucagon (human recombinant)  •  insulin lispro **AND** insulin lispro  •  ipratropium-albuterol  •  melatonin  •  Morphine  •  ondansetron **OR** ondansetron  •  oxyCODONE  •   sodium chloride  •  sodium chloride  •  sodium chloride        Assessment/Plan       Antimicrobial Therapy   1.  IV ceftriaxone        2.        3.        4.        5.            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  We need to rule out endocarditis or GI malignancy  Colonoscopy was done on May 10, 2022 and found to have polyps and biopsies are pending     The patient presented hospital with shortness of breath and chest x-ray showed density at the right base.  Need to rule out pneumonia  -CT showed some large bilateral pleural effusions but no obvious pneumonia which most likely secondary to volume overload  -Patient is currently on 2 L of oxygen by cannula     End-stage renal disease on hemodialysis.  Patient had right chest tunneled dialysis catheter and left arm AV fistula     History of CVA    S/p cardiac arrest with V. fib rhythm resuscitated successfully on May 11, 2022     Plan     Continue ceftriaxone 2 g IV daily  I will discuss the case with cardiology service regarding transesophageal echo to rule out vegetation.  If MARSHA is negative then consider 2 weeks of IV ceftriaxone otherwise the patient will need to be on 6 weeks of antibiotics  Supportive care  A.m. labs        Radha Jack MD  05/12/22  12:12 EDT    Note is dictated utilizing voice recognition software/Dragon

## 2022-05-12 NOTE — PLAN OF CARE
Goal Outcome Evaluation:         Patient has rested better last night, pain improved with prn analgesic. Blood pressure low but not critical and has improved overnight. Heart rate stable wnl.No urine output. No respiratory distress.

## 2022-05-12 NOTE — PLAN OF CARE
Goal Outcome Evaluation:   Vitals stable throughout the day, no urine output and no bowel movements.  Patient alert, occasionally disoriented to month/year. HD scheduled for tomorrow.  MARSHA and heart cath scheduled for tomorrow.  PCU overflow, awaiting bed for transfer.         Progress: improving

## 2022-05-12 NOTE — PROGRESS NOTES
[facility] Medicine Services Daily Progress Note    Patient Name: Benson Mclean  : 1950  MRN: [id number]  Primary Care Physician:  Rudolph Rooney MD  Date of admission: 2022    Previous notes and with minor updates.  Subjective        Chief Complaint: Shortness of breath fluid overload.      Brief history:    Patient is a 71 y.o. male with  past medical history of coronary artery disease status post CABG/cardiac stent placement, ESRD on HD [facility], chronic systolic congestive heart failure, chronic atrial fibrillation, CVA with residual right-sided weakness, COPD on chronic oxygen 3L O2 via NC, ANNETTE on Cpap, previous PE,  HTN, HLD, type 2 diabetes mellitus complicated by neuropathy, anemia of renal disease, vitamin deficiencies, and dementia who presented to [facility] ED 2022 from dialysis center. Apparently he was sent to the ED for shortness of breath before completing his dialysis treatment. He had one hour left per nursing report. He also apparently did not have dialysis on Wednesday. The patient complains of shortness of breath and cough, possibly a fever. No increased lower extremity edema. He stated he is bedbound. He is a poor historian and not willing to offer a great deal of information.   Patient was seen in the emergency room and in the ED the patient had CXR that showed small right greater than left pleural effusions.  Bibasilar airspace disease, right greater than left, favored to represent atelectasis.  proBNP greater then 70,000, troponin 0.33, creatinine 2.74, BUN 15, normal potassium, normal WBC, normal lactate, Hgb 11.0.  Blood cultures drawn.  COVID-negative.  Pt on his home rate of 3L O2 via nasal cannula. Nephrology was consulted with plans for hemodialysis.  He was admitted to the hospitalist group for further treatment of acute on chronic respiratory failure secondary to fluid overload, needs dialysis.       5/8/2022.  Patient is complaining that he cannot eat normal food because he is on a mechanical soft diet.  Speech therapy following    5/9/2022.  Patient was seen and examined.  Patient reports having a good night sleep.  Patient is on clear liquid diet now and n.p.o. after midnight.    5/10/2022.  Patient was seen and examined.  Infectious disease consult was completed because of strep bovis bacteremia.  Endoscopy is planned soon.  Eliquis is on hold.    5/12/2022  The patient overall was feeling somewhat better.  He is wanting of fig condon bar.  He otherwise had no requests and no complaints.     ROS negative except as above    Objective      Vitals:   Temp:  [97.5 °F (36.4 °C)-98.7 °F (37.1 °C)] 98.1 °F (36.7 °C)  Heart Rate:  [56-70] 68  Resp:  [12-16] 16  BP: (113)/(43) 113/43  Arterial Line BP: ()/(31-54) 119/48  Flow (L/min):  [3] 3    Physical Exam  Vitals reviewed.   Constitutional:   Patient appears chronically ill.     Comments: Chronically weak appearing   patient is lying comfortably in bed and in no acute distress.  HENT:      Head: Normocephalic.   Cardiovascular:      Rate and Rhythm: Normal rate.   Pulmonary:      Effort: Pulmonary effort is normal.   Abdominal:      General: Abdomen is flat.      Palpations: Abdomen is soft.  There is no tenderness to palpation no palpable organomegaly or masses.  Musculoskeletal:         General: Normal range of motion.      Cervical back: Normal range of motion.   Skin:     General: Skin is warm.   Neurological:      General: No focal deficit present.      Mental Status: He is alert and oriented to person, place, and time.   Psychiatric:         Mood and Affect: Mood normal.         Behavior: Behavior normal.        Result Review    Result Review:  I have personally reviewed the results from the time of this admission to 5/12/2022 18:03 EDT and agree with these findings:  [x]  Laboratory  [x]  Microbiology  [x]  Radiology  []  EKG/Telemetry   []   Cardiology/Vascular   []  Pathology  []  Old records  []  Other:  Most notable findings include:     Radiology Results (last 21 days)    Procedure Component Value Units Date/Time   CT Chest Without Contrast Diagnostic [[id number]] Vinh as Reviewed   Order Status: Completed Collected: 05/07/22 1740    Updated: 05/07/22 1758   Narrative:     Examination: CT CHEST WO CONTRAST DIAGNOSTIC-       Date of Exam: 5/7/2022 5:14 PM       Indication: Possible pneumonia; N18.6-End stage renal disease;   Z99.2-Dependence on renal dialysis; R06.00-Dyspnea, unspecified;   Z91.15-Patient's noncompliance with renal dialysis; U00-Gvtzmqk   effusion, not elsewhere classified.       Comparison: Correlation with chest radiograph 05/06/2022.       Technique: Non-contrast axial volumetric CT imaging of the chest was   performed. Automated exposure control and iterative reconstruction   methods were used.       Findings:   There is moderate to large right and small-to-moderate left pleural   effusion. There is complete atelectasis of the right lower lobe and   atelectasis of approximately 60% of the left lower lobe. There is   partial atelectasis of both upper lobes and the right middle lobe.   Overall, aerated lung occupies approximately 40% of the thoracic cavity.   There is a 7 mm nodule in the subpleural anterior left upper lobe on   axial image 36. There is no pneumothorax. Central airways are patent.   There is a small amount of mucoid debris in the right posterior trachea.       Thyroid, remainder of the trachea and esophagus appear within normal   limits. There is severe native coronary artery calcification. The   patient appears status post median sternotomy and CABG. The heart is   enlarged. There is diminished attenuation of the blood pool suggesting   anemia. The main pulmonary artery is enlarged up to 42 mm suggesting   increased right heart pressure. No pericardial effusion. No pathologic   mediastinal adenopathy.       There  is skin thickening and edema in the midline upper back area there   is marked diffuse muscular atrophy. There is a small volume of   perihepatic ascites. There is a left kidney cyst at the inferior pole   measuring 28 mm. There is an exophytic simple right kidney cyst   measuring 24 mm. The patient is status post cholecystectomy. There is   mild bilateral gynecomastia. There is no acute osseous abnormality or   destructive bone lesion. The bones appear demineralized. There are mild   degenerative changes.       Impression:            1. Moderate to large right and small-to-moderate left pleural effusions   with significant passive atelectasis. Aerated lung occupies   approximately 40% of the anatomic lung space.   2. Enlarged main pulmonary artery suggesting increased right heart   pressure.   3. Coronary artery disease status post CABG.   5. Marked atrophy of the musculature diffusely.   6. Gynecomastia.       Electronically Signed By-Rigoberto Hill MD On:5/7/2022 5:45 PM   This report was finalized on [id number] by  Rigoberto Hill MD.          Wounds (last 24 hours)     LDA Wound     Row Name 05/12/22 1600 05/12/22 1200 05/12/22 0800       Wound 05/06/22 1706 Right heel    Wound - Properties Group Placement Date: 05/06/22  -MAGNO Placement Time: 1706  -JP Present on Hospital Admission: Y  -MAGNO Side: Right  -MAGNO Location: heel  -MAGNO    Dressing Appearance open to air  -MN open to air  -MN open to air  -MN    Retired Wound - Properties Group Placement Date: 05/06/22  -MAGNO Placement Time: 1706  -JP Present on Hospital Admission: Y  -AMGNO Side: Right  -MAGNO Location: heel  -MAGNO    Retired Wound - Properties Group Date first assessed: 05/06/22  -MAGNO Time first assessed: 1706  -MAGNO Present on Hospital Admission: Y  -MAGNO Side: Right  -MAGNO Location: heel  -MAGNO       Wound 03/30/22 0343 Left heel    Wound - Properties Group Placement Date: 03/30/22  -AS Placement Time: 0343  -AS Present on Hospital Admission: Y  -AS Side: Left  -AS  Location: heel  -AS    Dressing Appearance open to air  -MN open to air  -MN open to air  -MN    Retired Wound - Properties Group Placement Date: 03/30/22  -AS Placement Time: 0343  -AS Present on Hospital Admission: Y  -AS Side: Left  -AS Location: heel  -AS    Retired Wound - Properties Group Date first assessed: 03/30/22  -AS Time first assessed: 0343  -AS Present on Hospital Admission: Y  -AS Side: Left  -AS Location: heel  -AS       Wound 05/06/22 1710 Right anterior greater trochanter    Wound - Properties Group Placement Date: 05/06/22  -MAGNO Placement Time: 1710  -MAGNO Present on Hospital Admission: Y  -MAGNO Side: Right  -MAGNO Orientation: anterior  -MAGNO Location: greater trochanter  -MAGNO    Dressing Appearance open to air  -MN open to air  -MN open to air  -MN    Retired Wound - Properties Group Placement Date: 05/06/22  -MAGNO Placement Time: 1710  -MAGNO Present on Hospital Admission: Y  -MAGNO Side: Right  -MAGNO Orientation: anterior  -MAGNO Location: greater trochanter  -MAGNO    Retired Wound - Properties Group Date first assessed: 05/06/22  -MAGNO Time first assessed: 1710  -MAGNO Present on Hospital Admission: Y  -MAGNO Side: Right  -MAGNO Location: greater trochanter  -MAGNO       Wound 03/30/22 0341 Left posterior thigh    Wound - Properties Group Placement Date: 03/30/22  -AS Placement Time: 0341  -AS Present on Hospital Admission: Y  -AS Side: Left  -AS Orientation: posterior  -AS Location: thigh  -AS    Dressing Appearance open to air  -MN open to air  -MN open to air  -MN    Retired Wound - Properties Group Placement Date: 03/30/22  -AS Placement Time: 0341  -AS Present on Hospital Admission: Y  -AS Side: Left  -AS Orientation: posterior  -AS Location: thigh  -AS    Retired Wound - Properties Group Date first assessed: 03/30/22  -AS Time first assessed: 0341  -AS Present on Hospital Admission: Y  -AS Side: Left  -AS Location: thigh  -AS       Wound 03/30/22 0340 perineum Pressure Injury    Wound - Properties Group Placement Date:  03/30/22  -AS Placement Time: 0340  -AS Present on Hospital Admission: Y  -AS Location: perineum  -AS Primary Wound Type: Pressure inj  -AS    Dressing Appearance open to air  -MN open to air  -MN open to air  -MN    Retired Wound - Properties Group Placement Date: 03/30/22  -AS Placement Time: 0340  -AS Present on Hospital Admission: Y  -AS Location: perineum  -AS Primary Wound Type: Pressure inj  -AS    Retired Wound - Properties Group Date first assessed: 03/30/22  -AS Time first assessed: 0340  -AS Present on Hospital Admission: Y  -AS Location: perineum  -AS Primary Wound Type: Pressure inj  -AS       Wound 05/11/22 0230 Right posterior hand Skin Tear    Wound - Properties Group Placement Date: 05/11/22  - Placement Time: 0230  - Side: Right  - Orientation: posterior  - Location: hand  - Primary Wound Type: Skin tear  -    Dressing Appearance open to air  -MN intact  -MN intact  -MN    Retired Wound - Properties Group Placement Date: 05/11/22  - Placement Time: 0230  - Side: Right  - Orientation: posterior  - Location: hand  - Primary Wound Type: Skin tear  -    Retired Wound - Properties Group Date first assessed: 05/11/22  - Time first assessed: 0230  - Side: Right  - Location: hand  - Primary Wound Type: Skin tear  -    Row Name 05/12/22 0300 05/11/22 1900          Wound 05/06/22 1706 Right heel    Wound - Properties Group Placement Date: 05/06/22  -MAGNO Placement Time: 1706 -MAGNO Present on Hospital Admission: Y  -MAGNO Side: Right  -MAGNO Location: heel  -MAGNO     Dressing Appearance open to air  - open to air  -     Base dry  - dry;red  -     Care, Wound -- pressure removed  -     Retired Wound - Properties Group Placement Date: 05/06/22  -MAGNO Placement Time: 1706 -MAGNO Present on Hospital Admission: Y  -MAGNO Side: Right  -MAGNO Location: heel  -MAGNO     Retired Wound - Properties Group Date first assessed: 05/06/22  -MAGNO Time first assessed: 1706 -JP Present on Hospital Admission:  Y  -MAGNO Side: Right  -MAGNO Location: heel  -MAGNO            Wound 03/30/22 0343 Left heel    Wound - Properties Group Placement Date: 03/30/22  -AS Placement Time: 0343  -AS Present on Hospital Admission: Y  -AS Side: Left  -AS Location: heel  -AS     Dressing Appearance open to air  - --     Closure -- Open to air  -     Base dry  - dry  -     Care, Wound -- pressure removed  -     Retired Wound - Properties Group Placement Date: 03/30/22  -AS Placement Time: 0343  -AS Present on Hospital Admission: Y  -AS Side: Left  -AS Location: heel  -AS     Retired Wound - Properties Group Date first assessed: 03/30/22  -AS Time first assessed: 0343  -AS Present on Hospital Admission: Y  -AS Side: Left  -AS Location: heel  -AS            Wound 05/06/22 1710 Right anterior greater trochanter    Wound - Properties Group Placement Date: 05/06/22  -MAGNO Placement Time: 1710  -MAGNO Present on Hospital Admission: Y  -MAGNO Side: Right  -MAGNO Orientation: anterior  -MAGNO Location: greater trochanter  -MAGNO     Dressing Appearance open to air  - open to air  -     Base red  - red  -     Retired Wound - Properties Group Placement Date: 05/06/22  -MAGNO Placement Time: 1710  -MAGNO Present on Hospital Admission: Y  -MAGNO Side: Right  -MAGNO Orientation: anterior  -MAGNO Location: greater trochanter  -MAGNO     Retired Wound - Properties Group Date first assessed: 05/06/22  -MAGNO Time first assessed: 1710  -MAGNO Present on Hospital Admission: Y  -MAGNO Side: Right  -MAGNO Location: greater trochanter  -MAGNO            Wound 03/30/22 0341 Left posterior thigh    Wound - Properties Group Placement Date: 03/30/22  -AS Placement Time: 0341  -AS Present on Hospital Admission: Y  -AS Side: Left  -AS Orientation: posterior  -AS Location: thigh  -AS     Dressing Appearance open to air  - open to air  -     Base red;moist  - red;moist  -     Retired Wound - Properties Group Placement Date: 03/30/22  -AS Placement Time: 0341  -AS Present on Hospital Admission: Y  -AS  Side: Left  -AS Orientation: posterior  -AS Location: thigh  -AS     Retired Wound - Properties Group Date first assessed: 03/30/22  -AS Time first assessed: 0341  -AS Present on Hospital Admission: Y  -AS Side: Left  -AS Location: thigh  -AS            Wound 03/30/22 0340 perineum Pressure Injury    Wound - Properties Group Placement Date: 03/30/22  -AS Placement Time: 0340  -AS Present on Hospital Admission: Y  -AS Location: perineum  -AS Primary Wound Type: Pressure inj  -AS     Dressing Appearance open to air  - open to air  -     Base red;moist  - red;moist  -     Periwound -- excoriated  -     Retired Wound - Properties Group Placement Date: 03/30/22  -AS Placement Time: 0340  -AS Present on Hospital Admission: Y  -AS Location: perineum  -AS Primary Wound Type: Pressure inj  -AS     Retired Wound - Properties Group Date first assessed: 03/30/22  -AS Time first assessed: 0340  -AS Present on Hospital Admission: Y  -AS Location: perineum  -AS Primary Wound Type: Pressure inj  -AS            Wound 05/11/22 0230 Right posterior hand Skin Tear    Wound - Properties Group Placement Date: 05/11/22  - Placement Time: 0230  - Side: Right  - Orientation: posterior  - Location: hand  - Primary Wound Type: Skin tear  -     Dressing Appearance intact  - intact  -     Base dressing in place, unable to visualize  - dressing in place, unable to visualize  -     Retired Wound - Properties Group Placement Date: 05/11/22  - Placement Time: 0230  - Side: Right  - Orientation: posterior  - Location: hand  - Primary Wound Type: Skin tear  -     Retired Wound - Properties Group Date first assessed: 05/11/22  - Time first assessed: 0230  - Side: Right  - Location: hand  - Primary Wound Type: Skin tear  -           User Key  (r) = Recorded By, (t) = Taken By, (c) = Cosigned By    Initials Name Provider Type    Jessica Isaac RN Registered Nurse    Zainab Griffiths RN Registered  Nurse    AS Maricruz Mi, RN Registered Nurse    Radha Chaudhry, RN Registered Nurse                     Assessment/Plan    71-year-old gentleman with fluid overload and bacteremia     Active Hospital Problems:          Active Hospital Problems     Diagnosis     • **Fluid overload     • Chronic respiratory failure with hypoxia, on home oxygen therapy (HCC)     • Elevated troponin     • Hypothyroidism (acquired)     • End stage renal disease (HCC)     • Anemia of renal disease     • Mixed hyperlipidemia     • Major depressive disorder, recurrent, mild (HCC)     • Flaccid hemiplegia of right dominant side as late effect of cerebral infarction ([facility])     • COPD with acute exacerbation ([facility])     • S/P CABG (coronary artery bypass graft)     • Essential hypertension     • ANNETTE treated with BiPAP        Plan:      Shortness of breath secondary to fluid overload  Chronic respiratory failure with oxygen dependence    -Improving.  -CXR: small right greater than left pleural effusions.  Bibasilar airspace disease, right greater than left, favored to represent atelectasis.   -proBNP >70,000 (previous proBNP march 2022 was also >70,000)       -will recheck in the a.m.  -pt on his home rate of 3L O2 via NC  -pt did not complete dialysis  -nephrology consulted appreciate assistance     Possible strep bovis bacteremia.  ID is following.  IV antibiotics ordered  Pending culture of dialysis catheter  Pending final identification  Echo pending  Appreciate ID assistance    Chronically elevated troponin  -troponin 0.33 compared to troponin 0.146 on 3/12/2022.   -Will trend      ESRD on HD MWF  -on midodrine for BP support  -nephrology to manage     CVA with right sided residual weakness  -on eliquis, statin     CAD s/p CABG, PCI  Chronic atrial fibrillation  Hyperlipidemia  -chronic, controlled.   -continue Eliquis, atorvastatin, and metoprolol     DM type 2  -hemoglobin A1c 6.3 on 3/12/2022 and previous admission basal insulin  stopped due to hypoglycemia  -SSI AC/HS     Hypothyroidism  -Continue home synthroid     Major depressive disorder, recurrent, mild   Dementia  -Continue home zoloft, aricept     ANNETTE  -cpap qhs      Obesity (BMI 30-39.9)  BMI 36.92  -Lifestyle modifications to reduce cardiovascular risk factors     Dysphagia  Patient complaining about his current diet.  Speech following.     DVT prophylaxis: eliquis      CODE STATUS:    Admission Status:  I believe this patient meets observation status.     I discussed the patient's findings and my recommendations with patient.     This patient has been examined wearing appropriate Personal Protective Equipment.    05/12/22      Electronically signed by Kasandra Ocampo MD, FACP, 05/12/22, 18:03 EDT.      [facility] Hospitalist Team

## 2022-05-12 NOTE — PROGRESS NOTES
RENAL/KCC:    Name: Benson Mclean  Age: 71 y.o.  : 1950  Sex: male    22    Subjective  S/P HD yesterday.  C/O some back and chest discomfort.       Objective:    Vital Signs  Temp:  [97.3 °F (36.3 °C)-98.7 °F (37.1 °C)] 97.5 °F (36.4 °C)  Heart Rate:  [56-78] 62  Resp:  [10-16] 16  BP: ()/(39-83) 113/43  Arterial Line BP: ()/(30-81) 123/48        Intake/Output Summary (Last 24 hours) at 2022 0931  Last data filed at 2022 0500  Gross per 24 hour   Intake 1094 ml   Output 1500 ml   Net -406 ml           Physical Exam  Physical Exam  Constitutional:       General: He is not in acute distress.     Appearance: He is not diaphoretic.   HENT:      Head: Normocephalic and atraumatic.      Nose: Nose normal.   Eyes:      Pupils: Pupils are equal, round, and reactive to light.   Neck:      Thyroid: No thyromegaly.      Vascular: No JVD.      Trachea: No tracheal deviation.   Cardiovascular:      Rate and Rhythm: Normal rate and regular rhythm.      Heart sounds: No murmur heard.    No friction rub. No gallop.   Pulmonary:      Effort: Pulmonary effort is normal. No respiratory distress.      Breath sounds: No stridor. No wheezing or rales.   Chest:      Chest wall: No tenderness.   Abdominal:      General: Bowel sounds are normal.      Palpations: Abdomen is soft. There is no mass.      Tenderness: There is no abdominal tenderness. There is no guarding or rebound.      Hernia: No hernia is present.   Musculoskeletal:         General: No tenderness or deformity. Normal range of motion.      Cervical back: Normal range of motion and neck supple.   Skin:     General: Skin is warm and dry.      Coloration: Skin is not pale.      Findings: No rash.   Neurological:      Mental Status: He is alert and oriented to person, place, and time.      Cranial Nerves: No cranial nerve deficit.      Motor: No abnormal muscle tone.      Coordination: Coordination normal.      Deep Tendon Reflexes:  Reflexes are normal and symmetric. Reflexes normal.   Psychiatric:         Behavior: Behavior normal.         Thought Content: Thought content normal.         Judgment: Judgment normal.            Results Review:      Results from last 7 days   Lab Units 05/12/22  0501 05/11/22  0555 05/10/22  0706 05/09/22  0228 05/08/22 0446 05/07/22 0417 05/06/22  1045   SODIUM mmol/L 133* 133* 135* 130* 133*   < > 138   CO2 mmol/L 23.0 24.0 24.0 24.0 25.0   < > 30.0*   BUN mg/dL 12 21 17 24* 19   < > 15   CREATININE mg/dL 2.48* 3.41* 2.86* 3.39* 2.94*   < > 2.74*   CALCIUM mg/dL 8.1* 8.0* 8.7 7.9* 8.1*   < > 8.1*   ALBUMIN g/dL 3.00* 2.80* 2.90*  --  2.60*  --  3.10*   AST (SGOT) U/L 12 10 12  --  12  --  11   ALT (SGPT) U/L 7 8 6  --  5  --  5   EGFR mL/min/1.73 27.1* 18.5* 22.8* 18.6* 22.1*   < > 24.0*    < > = values in this interval not displayed.       Results from last 7 days   Lab Units 05/12/22  0501 05/11/22  0555 05/10/22  0706 05/09/22  0528 05/08/22 0446 05/07/22 0417 05/06/22  1045   WBC 10*3/mm3 7.20 11.20* 6.70 6.90 6.10 6.70 5.20   INR   --   --  1.09  --   --   --   --          Medication Review:   amiodarone, 200 mg, Oral, Q24H  atorvastatin, 40 mg, Oral, Nightly  budesonide, 0.5 mg, Nebulization, BID  cefTRIAXone, 2 g, Intravenous, Q24H  donepezil, 10 mg, Oral, Nightly  enoxaparin, 1 mg/kg, Subcutaneous, Q24H  insulin lispro, 0-7 Units, Subcutaneous, TID AC  ipratropium-albuterol, 3 mL, Nebulization, TID - RT  levothyroxine, 50 mcg, Oral, Q AM  lidocaine, 20 mL, Intradermal, Once  metoprolol tartrate, 25 mg, Oral, BID  midodrine, 15 mg, Oral, TID AC  pantoprazole, 40 mg, Oral, QAM  polyethylene glycol, 17 g, Oral, BID  sertraline, 50 mg, Oral, Daily  sodium chloride, 10 mL, Intravenous, Q12H  sodium chloride, 10 mL, Intravenous, Q12H  sodium phosphate IVPB, 15 mmol, Intravenous, Once  sorbitol, 50 mL, Oral, Daily          Assessment:    End-stage renal disease    Bacteremia    S/P CODE    A-fib - on  Amio    SOA    CHF    History of CVA    History of coronary artery disease    Diabetes      Plan:  HD MWF as tolerated  BP support  ABX per ID - needs MARSHA  Will follow      Yony Bhat MD  05/12/22  09:31 EDT  Tel: [phone]  Fax: [phone]

## 2022-05-12 NOTE — PROGRESS NOTES
ICU Daily Progress Note        Chronic systolic (congestive) heart failure (HCC)    S/P CABG (coronary artery bypass graft)    Primary hypertension    Elevated troponin    ANNETTE treated with BiPAP    Major depressive disorder, recurrent, mild (HCC)    Mixed hyperlipidemia    Flaccid hemiplegia affecting right dominant side ([facility])    Diabetic neuropathy ([facility])    Chronic obstructive pulmonary disease with (acute) exacerbation (HCC)    Anemia of renal disease    End stage renal disease (HCC)    Chronic respiratory failure with hypoxia, on home oxygen therapy ([facility])    Other specified hypothyroidism    Fluid overload    Stage 5 chronic kidney disease on chronic dialysis ([facility])    Bacteremia    Normocytic anemia due to blood loss    Essential (primary) hypertension    Cardiopulmonary arrest with successful resuscitation ([facility])    Ventricular fibrillation ([facility])              Assessment:     S/p Code blue was in V-fib. was defibrillated x1.  Patient was given an epi x1, bicarb x2. Upon pulse checks patient was found to be with a pulse and in normal sinus with a left bundle. Patient was moaning and turning his head. He became more and more alert.  Patient did not require intubation.       He continued to oxygenate well with nasal cannula. Within about 5 minutes the patient was asking to sit up and asking for water     Bacteremia with Streptococcus gallolyticus previously called Streptococcus bovis could be associated with GI malignancy     ESRD on HD [id number],   chronic systolic congestive heart failure,   CAD s/p CABG, cardiac stent,   chronic atrial fibrillation,   CVA with residual right-sided weakness,   COPD on chronic oxygen 3L O2 via NC,   ANNETTE on Cpap,   previous PE,    HTN,   HLD,   type 2 diabetes mellitus complicated by neuropathy,   anemia of renal disease,   vitamin deficiencies,   dementia         Recommendations:     Heart cath in AM  Amiodarone drip changed to PO    MARSHA to rule out endocarditis due to  Streptococcus gallolyticus bacteremia  Colonoscopy completed, may need CT abdomen    Antibiotics currently on Rocephin    Hemodialysis     BiPAP 12/5 to be used at night and as needed during the day               LOS: 6 days         Vital signs for last 24 hours:  Vitals:    05/12/22 0653 05/12/22 0700 05/12/22 0800 05/12/22 0817   BP:    113/43   BP Location:       Patient Position:       Pulse: 63 62     Resp: 16      Temp:   97.5 °F (36.4 °C)    TempSrc:       SpO2: 100% 100%     Weight:       Height:           Intake/Output last 3 shifts:  I/O last 3 completed shifts:  In: 1274 [P.O.:540; I.V.:684; IV Piggyback:50]  Out: 1500 [Other:1500]  Intake/Output this shift:  No intake/output data recorded.    Vent settings for last 24 hours:       Hemodynamic parameters for last 24 hours:       Radiology  Imaging Results (Last 24 Hours)     ** No results found for the last 24 hours. **          Labs:  Results from last 7 days   Lab Units 05/12/22  0501   WBC 10*3/mm3 7.20   HEMOGLOBIN g/dL 10.3*   HEMATOCRIT % 32.7*   PLATELETS 10*3/mm3 208     Results from last 7 days   Lab Units 05/12/22  0501   SODIUM mmol/L 133*   POTASSIUM mmol/L 4.2   CHLORIDE mmol/L 100   CO2 mmol/L 23.0   BUN mg/dL 12   CREATININE mg/dL 2.48*   CALCIUM mg/dL 8.1*   BILIRUBIN mg/dL 0.3   ALK PHOS U/L 45   ALT (SGPT) U/L 7   AST (SGOT) U/L 12   GLUCOSE mg/dL 122*     Results from last 7 days   Lab Units 05/11/22  0253   PH, ARTERIAL pH units 7.362   PO2 ART mm Hg 100.1   PCO2, ARTERIAL mm Hg 47.0   HCO3 ART mmol/L 26.7     Results from last 7 days   Lab Units 05/12/22  0501 05/11/22  0555 05/10/22  0706   ALBUMIN g/dL 3.00* 2.80* 2.90*     Results from last 7 days   Lab Units 05/06/22  1828 05/06/22  1045   TROPONIN T ng/mL 0.311* 0.330*         Results from last 7 days   Lab Units 05/12/22  0501   MAGNESIUM mg/dL 1.9     Results from last 7 days   Lab Units 05/10/22  0706   INR  1.09               Meds:   SCHEDULE  amiodarone, 200 mg, Oral,  Q24H  atorvastatin, 40 mg, Oral, Nightly  budesonide, 0.5 mg, Nebulization, BID  cefTRIAXone, 2 g, Intravenous, Q24H  donepezil, 10 mg, Oral, Nightly  enoxaparin, 1 mg/kg, Subcutaneous, Q24H  insulin lispro, 0-7 Units, Subcutaneous, TID AC  ipratropium-albuterol, 3 mL, Nebulization, TID - RT  levothyroxine, 50 mcg, Oral, Q AM  lidocaine, 20 mL, Intradermal, Once  metoprolol tartrate, 25 mg, Oral, BID  midodrine, 15 mg, Oral, TID AC  pantoprazole, 40 mg, Oral, QAM  polyethylene glycol, 17 g, Oral, BID  sertraline, 50 mg, Oral, Daily  sodium chloride, 10 mL, Intravenous, Q12H  sodium chloride, 10 mL, Intravenous, Q12H  sodium phosphate IVPB, 15 mmol, Intravenous, Once  sorbitol, 50 mL, Oral, Daily      Infusions     PRNs  •  acetaminophen **OR** acetaminophen **OR** acetaminophen  •  aluminum-magnesium hydroxide-simethicone  •  dextrose  •  dextrose  •  glucagon (human recombinant)  •  insulin lispro **AND** insulin lispro  •  ipratropium-albuterol  •  melatonin  •  Morphine  •  ondansetron **OR** ondansetron  •  oxyCODONE  •  sodium chloride  •  sodium chloride  •  sodium chloride    Physical Exam:  Physical Exam  Cardiovascular:      Heart sounds: Murmur heard.   Pulmonary:      Breath sounds: Rhonchi and rales present.         ROS  Review of Systems   Respiratory: Positive for cough and shortness of breath.          Critical Care Time greater than: 45 Minutes  Total time spent with patient greater than: 45 Minutes

## 2022-05-12 NOTE — PROGRESS NOTES
Referring Provider: Jeff Black MD    Reason for follow-up:  Shortness of breath       Patient Care Team:  Rudolph Rooney MD as PCP - General (Family Medicine)  Samantha Zabala APRN as PCP - Family Medicine  Jose G Rm MD as Consulting Physician (Cardiology)    Subjective .      ROS    Since I have last seen, the patient has been without any chest discomfort ,shortness of breath, palpitations, dizziness or syncope.  Denies having any headache ,abdominal pain ,nausea, vomiting , diarrhea constipation, loss of weight or loss of appetite.  Denies having any excessive bruising ,hematuria or blood in the stool.    Review of all systems negative except as indicated    History  Past Medical History:   Diagnosis Date   • A-fib ([facility]) 8/3/2021   • Anemia    • Appetite loss    • Arthritis    • Brain bleed ([facility])    • Carpal tunnel syndrome on left    • CHF (congestive heart failure) ([facility])    • Chronic left-sided low back pain without sciatica 12/27/2017   • CKD (chronic kidney disease) stage 3, GFR 30-59 ml/min ([facility])    • Closed fracture of seventh thoracic vertebra ([facility]) 8/23/2020   • Cognitive communication deficit 12/1/2020   • COPD (chronic obstructive pulmonary disease) ([facility])    • Coronary artery disease    • COVID-19 2/19/2021   • Cytokine release syndrome, grade 1 5/24/2021   • Depression    • Diabetic neuropathy ([facility])    • Dialysis patient ([facility])     mon wed fri   • DM2 (diabetes mellitus, type 2) ([facility])    • GERD (gastroesophageal reflux disease)    • Hyperlipidemia    • Hypertension    • Hypogonadism in male    • Neuropathy    • Nonsustained ventricular tachycardia ([facility]) 7/23/2021   • Obesity    • Paroxysmal atrial fibrillation ([facility]) 12/1/2020   • Sleep apnea    • Stroke ([facility])    • Unsteady gait        Past Surgical History:   Procedure Laterality Date   • ANGIOPLASTY      X2   • APPENDECTOMY     • ARTERIOVENOUS FISTULA/SHUNT SURGERY Left 1/20/2022    Procedure: LEFT BRACHIAL CEPHALIC ARTERIAL VENOUS  FISTULA CREATION;  Surgeon: Yony Davila MD;  Location: [facility] MAIN OR;  Service: Vascular;  Laterality: Left;   • CARDIAC CATHETERIZATION  11/13/2015   • CARDIAC CATHETERIZATION N/A 5/24/2021    Procedure: Left Heart Cath and coronary angiogram;  Surgeon: Jose G Rm MD;  Location:  [facility] CATH INVASIVE LOCATION;  Service: Cardiovascular;  Laterality: N/A;   • CARDIAC CATHETERIZATION  5/24/2021    Procedure: Saphenous Vein Graft;  Surgeon: Jose G Rm MD;  Location:  [facility] CATH INVASIVE LOCATION;  Service: Cardiovascular;;   • CARDIAC CATHETERIZATION N/A 5/24/2021    Procedure: Percutaneous Coronary Intervention;  Surgeon: Bernabe Zambrano MD;  Location:  [facility] CATH INVASIVE LOCATION;  Service: Cardiology;  Laterality: N/A;   • CARDIAC CATHETERIZATION N/A 5/24/2021    Procedure: Stent NIELS coronary;  Surgeon: Bernabe Zambrano MD;  Location:  [facility] C[facility] INVASIVE [facility]ATION;  Service: Cardiology;  Laterality: N/A;   • CARDIAC CATHETERIZATION N/A 5/26/2021    Procedure: Percutaneous Coronary Intervention;  Surgeon: Bernabe Zambrano MD;  Location:  [facility] C[facility] INVASIVE LOCATION;  Service: Cardiovascular;  Laterality: N/A;   • CARDIAC CATHETERIZATION N/A 5/26/2021    Procedure: Stent NIELS bypass graft;  Surgeon: Bernabe Zambrano MD;  Location:  [facility] C[facility] INVASIVE [facility];  Service: Cardiovascular;  Laterality: N/A;   • CARDIAC ELECTROPHYSIOLOGY PROCEDURE N/A 5/24/2021    Procedure: Temporary Pacemaker;  Surgeon: Bernabe Zambrano MD;  Location:  [facility] CATH INVASIVE LOCATION;  Service: Cardiology;  Laterality: N/A;   • CARDIAC ELECTROPHYSIOLOGY PROCEDURE N/A 5/26/2021    Procedure: Temporary Pacemaker;  Surgeon: Bernabe Zambrano MD;  Location: [facility] CATH INVASIVE LOCATION;  Service: Cardiovascular;  Laterality: N/A;   • CARPAL TUNNEL RELEASE Left 04/29/2018    carpal tunnel- lt hand// other hand surgeries    • CATARACT EXTRACTION, BILATERAL  2002    Dr. Lux Acosta   • CHOLECYSTECTOMY     • COLON RESECTION  2005   •  COLONOSCOPY N/A 5/10/2022    Procedure: COLONOSCOPY with polypectomy x3;  Surgeon: Herbie Keane MD;  Location: [facility] ENDOSCOPY;  Service: Gastroenterology;  Laterality: N/A;  colon polyps;internal hemorrhoids   • CORONARY ANGIOPLASTY     • CORONARY ANGIOPLASTY WITH STENT PLACEMENT  11/13/2015    PTCA stent to proximal in stent and mid to distal lad   • CORONARY ANGIOPLASTY WITH STENT PLACEMENT  09/16/2016    PTCA stent to mid lad and stent to vein graft to marginal   • CORONARY ARTERY BYPASS GRAFT  2005    @ [facility]   • CYST REMOVAL      cyst removed from scrotum   • FOOT SURGERY Right 07/17/2018   • FOOT SURGERY Left 02/04/2019   • PORTACATH PLACEMENT     • TOE AMPUTATION Right     right toe removed D/T infected cut that went to the bone       Family History   Problem Relation Age of Onset   • Heart disease Mother    • Heart disease Father    • Diabetes Sister    • Heart disease Sister    • Diabetes Brother    • Mental illness Brother        Social History     Tobacco Use   • Smoking status: Former Smoker     Packs/day: 1.00     Years: 60.00     Pack years: 60.00     Types: Cigarettes   • Smokeless tobacco: Never Used   • Tobacco comment: Patient quit smoking 11/2020   Vaping Use   • Vaping Use: Never used   Substance Use Topics   • Alcohol use: No   • Drug use: Yes     Frequency: 1.0 times per week     Types: Marijuana     Comment: socially        Medications Prior to Admission   Medication Sig Dispense Refill Last Dose   • albuterol sulfate  (90 Base) MCG/ACT inhaler Inhale 2 puffs Every 4 (Four) Hours.   5/6/2022 at Unknown time   • apixaban (ELIQUIS) 5 MG tablet tablet Take 1 tablet by mouth Every 12 (Twelve) Hours. Indications: Atrial Fibrillation 60 tablet  5/6/2022 at Unknown time   • atorvastatin (LIPITOR) 40 MG tablet Take 40 mg by mouth Every Night.   5/5/2022 at Unknown time   • budesonide (Pulmicort) 0.5 MG/2ML nebulizer solution Take 2 mL by nebulization 2 (Two) Times a Day.   5/6/2022 at  Unknown time   • cholecalciferol (VITAMIN D3) 25 MCG (1000 UT) tablet Take 1,000 Units by mouth Daily.   5/6/2022 at Unknown time   • docusate sodium (COLACE) 100 MG capsule Take 100 mg by mouth Daily.   5/6/2022 at Unknown time   • donepezil (ARICEPT) 10 MG tablet Take 10 mg by mouth Every Night.   5/5/2022 at Unknown time   • fluticasone (FLONASE) 50 MCG/ACT nasal spray 2 sprays by Each Nare route 2 (Two) Times a Day.   5/6/2022 at Unknown time   • guaiFENesin (MUCINEX) 600 MG 12 hr tablet Take 600 mg by mouth 2 (Two) Times a Day.   5/6/2022 at Unknown time   • HYDROcodone-acetaminophen (NORCO) 5-325 MG per tablet Take 1 tablet by mouth 3 (Three) Times a Day.   5/6/2022 at Unknown time   • ipratropium-albuterol (DUO-NEB) 0.5-2.5 mg/3 ml nebulizer Take 3 mL by nebulization 3 (Three) Times a Day. 360 mL  5/6/2022 at Unknown time   • ipratropium-albuterol (DUO-NEB) 0.5-2.5 mg/3 ml nebulizer Take 3 mL by nebulization Every 4 (Four) Hours As Needed for Wheezing. 360 mL  5/6/2022 at Unknown time   • levothyroxine (SYNTHROID, LEVOTHROID) 50 MCG tablet Take 50 mcg by mouth Every Morning.   5/6/2022 at Unknown time   • Metoprolol Tartrate 37.5 MG tablet Take 37.5 mg by mouth 2 (Two) Times a Day. Hold for SBP <110 or HR < 60   5/6/2022 at Unknown time   • midodrine (PROAMATINE) 10 MG tablet Take 10 mg by mouth 3 (Three) Times a Day Before Meals. Hold for BP > 140/90   5/6/2022 at Unknown time   • omeprazole (priLOSEC) 20 MG capsule Take 20 mg by mouth Daily.   5/6/2022 at Unknown time   • polyethylene glycol (MiraLax) 17 g packet Take 17 g by mouth 2 (Two) Times a Day.   5/6/2022 at Unknown time   • sertraline (ZOLOFT) 50 MG tablet Take 50 mg by mouth Daily.   5/6/2022 at Unknown time   • vitamin B-12 (CYANOCOBALAMIN) 1000 MCG tablet Take 1,000 mcg by mouth Daily.   5/6/2022 at Unknown time   • bisacodyl (DULCOLAX) 10 MG suppository Insert 10 mg into the rectum Daily As Needed for Constipation.   Unknown at Unknown time   •  magnesium hydroxide (MILK OF MAGNESIA) 400 MG/5ML suspension Take 30 mL by mouth Daily As Needed for Constipation.   Unknown at Unknown time   • ondansetron (ZOFRAN) 4 MG tablet Take 4 mg by mouth Every 8 (Eight) Hours As Needed for Nausea or Vomiting.   Unknown at Unknown time   • phenylephrine-mineral oil-petrolatum (Preparation H) 0.25-14-74.9 % ointment hemorrhoidal ointment Insert 1 application into the rectum Daily As Needed.   Unknown at Unknown time       Allergies  Codeine    Scheduled Meds:amiodarone, 200 mg, Oral, Q24H  atorvastatin, 40 mg, Oral, Nightly  budesonide, 0.5 mg, Nebulization, BID  cefTRIAXone, 2 g, Intravenous, Q24H  donepezil, 10 mg, Oral, Nightly  enoxaparin, 1 mg/kg, Subcutaneous, Q24H  insulin lispro, 0-7 Units, Subcutaneous, TID AC  ipratropium-albuterol, 3 mL, Nebulization, TID - RT  levothyroxine, 50 mcg, Oral, Q AM  lidocaine, 20 mL, Intradermal, Once  metoprolol tartrate, 25 mg, Oral, BID  midodrine, 15 mg, Oral, TID AC  pantoprazole, 40 mg, Oral, QAM  polyethylene glycol, 17 g, Oral, BID  sertraline, 50 mg, Oral, Daily  sodium chloride, 10 mL, Intravenous, Q12H  sodium chloride, 10 mL, Intravenous, Q12H  sorbitol, 50 mL, Oral, Daily      Continuous Infusions:   PRN Meds:.•  acetaminophen **OR** acetaminophen **OR** acetaminophen  •  aluminum-magnesium hydroxide-simethicone  •  dextrose  •  dextrose  •  glucagon (human recombinant)  •  insulin lispro **AND** insulin lispro  •  ipratropium-albuterol  •  melatonin  •  Morphine  •  ondansetron **OR** ondansetron  •  oxyCODONE  •  sodium chloride  •  sodium chloride  •  sodium chloride    Objective     VITAL SIGNS  Vitals:    05/12/22 0200 05/12/22 0300 05/12/22 0330 05/12/22 0400   BP:       BP Location:       Patient Position:       Pulse: 62 61 66 60   Resp:       Temp:    97.8 °F (36.6 °C)   TempSrc:    Oral   SpO2: 100% 100% 99% 100%   Weight:    102 kg (223 lb 12.8 oz)   Height:           Flowsheet Rows    Flowsheet Row First Filed  "Value   Admission Height 177.8 cm (70\") Documented at 05/06/2022 1026   Admission Weight 104 kg (229 lb 0.9 oz) Documented at 05/06/2022 1026            Intake/Output Summary (Last 24 hours) at 5/12/2022 0605  Last data filed at 5/12/2022 0500  Gross per 24 hour   Intake 1094 ml   Output 1500 ml   Net -406 ml        TELEMETRY:    Physical Exam:  The patient is alert, oriented and in no distress.  Vital signs as noted above.  Exogenous obesity.  Head and neck revealed no carotid bruits or jugular venous distention.  No thyromegaly or lymphadenopathy is present  Lungs clear.  No wheezing.  Breath sounds are normal bilaterally.  Heart normal first and second heart sounds.  No murmur. No precordial rub is present.  No gallop is present.  Abdomen soft and nontender.  No organomegaly is present.  Extremities with good peripheral pulses without any pedal edema.  Skin warm and dry.  Musculoskeletal system is grossly normal  CNS grossly normal      Results Review:   I reviewed the patient's new clinical results.  Lab Results (last 24 hours)     Procedure Component Value Units Date/Time    Comprehensive Metabolic Panel [[id number]]  (Abnormal) Collected: 05/12/22 0501    Specimen: Blood Updated: 05/12/22 0544     Glucose 122 mg/dL      BUN 12 mg/dL      Creatinine 2.48 mg/dL      Sodium 133 mmol/L      Potassium 4.2 mmol/L      Chloride 100 mmol/L      CO2 23.0 mmol/L      Calcium 8.1 mg/dL      Total Protein 5.4 g/dL      Albumin 3.00 g/dL      ALT (SGPT) 7 U/L      AST (SGOT) 12 U/L      Alkaline Phosphatase 45 U/L      Total Bilirubin 0.3 mg/dL      Globulin 2.4 gm/dL      A/G Ratio 1.3 g/dL      BUN/Creatinine Ratio 4.8     Anion Gap 10.0 mmol/L      eGFR 27.1 mL/min/1.73      Comment: National Kidney Foundation and American Society of Nephrology (ASN) Task Force recommended calculation based on the Chronic Kidney Disease Epidemiology Collaboration (CKD-EPI) equation refit without adjustment for race.       Narrative:      " GFR Normal >60  Chronic Kidney Disease <60  Kidney Failure <15      Phosphorus [[id number]]  (Abnormal) Collected: 05/12/22 0501    Specimen: Blood Updated: 05/12/22 0544     Phosphorus 2.0 mg/dL     Magnesium [[id number]]  (Normal) Collected: 05/12/22 0501    Specimen: Blood Updated: 05/12/22 0536     Magnesium 1.9 mg/dL     CBC & Differential [[id number]]  (Abnormal) Collected: 05/12/22 0501    Specimen: Blood Updated: 05/12/22 0508    Narrative:      The following orders were created for panel order CBC & Differential.  Procedure                               Abnormality         Status                     ---------                               -----------         ------                     CBC Auto Differential[[id number]]        Abnormal            Final result                 Please view results for these tests on the individual orders.    CBC Auto Differential [[id number]]  (Abnormal) Collected: 05/12/22 0501    Specimen: Blood Updated: 05/12/22 0508     WBC 7.20 10*3/mm3      RBC 3.47 10*6/mm3      Hemoglobin 10.3 g/dL      Hematocrit 32.7 %      MCV 94.2 fL      MCH 29.8 pg      MCHC 31.6 g/dL      RDW 17.2 %      RDW-SD 58.2 fl      MPV 7.4 fL      Platelets 208 10*3/mm3      Neutrophil % 65.0 %      Lymphocyte % 22.0 %      Monocyte % 10.9 %      Eosinophil % 1.2 %      Basophil % 0.9 %      Neutrophils, Absolute 4.70 10*3/mm3      Lymphocytes, Absolute 1.60 10*3/mm3      Monocytes, Absolute 0.80 10*3/mm3      Eosinophils, Absolute 0.10 10*3/mm3      Basophils, Absolute 0.10 10*3/mm3      nRBC 0.1 /100 WBC     POC Glucose Once [[id number]]  (Abnormal) Collected: 05/11/22 1712    Specimen: Blood Updated: 05/11/22 1713     Glucose 110 mg/dL      Comment: Serial Number: [id number]:  [id number]       Blood Culture - Blood, Hand, Right [[id number]]  (Normal) Collected: 05/07/22 1519    Specimen: Blood from Hand, Right Updated: 05/11/22 1617     Blood Culture No growth at 4 days    POC Glucose Once [[id number]]   (Normal) Collected: 05/11/22 1118    Specimen: Blood Updated: 05/11/22 1120     Glucose 77 mg/dL      Comment: Serial Number: [id number]:  [id number]       SKYE AURIS SCREEN - Swab, Axilla Right, Axilla Left and Groin [[id number]]  (Normal) Collected: 05/07/22 1020    Specimen: Swab from Axilla Right, Axilla Left and Groin Updated: 05/11/22 1032     Candida Auris Screen Culture No Candida auris isolated at 4 days    Tissue Pathology Exam [[id number]] Collected: 05/10/22 1402    Specimen: Polyp from Large Intestine, Left / Descending Colon; Polyp from Large Intestine, Right / Ascending Colon Updated: 05/11/22 0938    POC Glucose Once [[id number]]  (Normal) Collected: 05/11/22 0808    Specimen: Blood Updated: 05/11/22 0810     Glucose 104 mg/dL      Comment: Serial Number: [id number]:  [id number]       MRSA Screen, PCR (Inpatient) - Swab, Nares [[id number]]  (Normal) Collected: 05/11/22 0557    Specimen: Swab from Nares Updated: 05/11/22 0804     MRSA PCR No MRSA Detected    Comprehensive Metabolic Panel [[id number]]  (Abnormal) Collected: 05/11/22 0555    Specimen: Blood Updated: 05/11/22 0633     Glucose 167 mg/dL      BUN 21 mg/dL      Creatinine 3.41 mg/dL      Sodium 133 mmol/L      Potassium 3.2 mmol/L      Chloride 97 mmol/L      CO2 24.0 mmol/L      Calcium 8.0 mg/dL      Total Protein 5.3 g/dL      Albumin 2.80 g/dL      ALT (SGPT) 8 U/L      AST (SGOT) 10 U/L      Alkaline Phosphatase 46 U/L      Total Bilirubin 0.3 mg/dL      Globulin 2.5 gm/dL      A/G Ratio 1.1 g/dL      BUN/Creatinine Ratio 6.2     Anion Gap 12.0 mmol/L      eGFR 18.5 mL/min/1.73      Comment: National Kidney Foundation and American Society of Nephrology (ASN) Task Force recommended calculation based on the Chronic Kidney Disease Epidemiology Collaboration (CKD-EPI) equation refit without adjustment for race.       Narrative:      GFR Normal >60  Chronic Kidney Disease <60  Kidney Failure <15      Lipid Panel [[id number]]  Collected: 05/11/22 0555    Specimen: Blood Updated: 05/11/22 0633     Total Cholesterol 117 mg/dL      Triglycerides 98 mg/dL      HDL Cholesterol 47 mg/dL      LDL Cholesterol  52 mg/dL      VLDL Cholesterol 18 mg/dL      LDL/HDL Ratio 1.07    Narrative:      Cholesterol Reference Ranges  (U.S. Department of Health and Human Services ATP III Classifications)    Desirable          <200 mg/dL  Borderline High    200-239 mg/dL  High Risk          >240 mg/dL      Triglyceride Reference Ranges  (U.S. Department of Health and Human Services ATP III Classifications)    Normal           <150 mg/dL  Borderline High  150-199 mg/dL  High             200-499 mg/dL  Very High        >500 mg/dL    HDL Reference Ranges  (U.S. Department of Health and Human Services ATP III Classifications)    Low     <40 mg/dl (major risk factor for CHD)  High    >60 mg/dl ('negative' risk factor for CHD)        LDL Reference Ranges  (U.S. Department of Health and Human Services ATP III Classifications)    Optimal          <100 mg/dL  Near Optimal     100-129 mg/dL  Borderline High  130-159 mg/dL  High             160-189 mg/dL  Very High        >189 mg/dL    Phosphorus [[id number]]  (Normal) Collected: 05/11/22 0555    Specimen: Blood Updated: 05/11/22 0632     Phosphorus 2.7 mg/dL     Magnesium [[id number]]  (Normal) Collected: 05/11/22 0555    Specimen: Blood Updated: 05/11/22 0632     Magnesium 2.1 mg/dL     Procalcitonin [[id number]]  (Abnormal) Collected: 05/11/22 0555    Specimen: Blood Updated: 05/11/22 0631     Procalcitonin 0.27 ng/mL     Narrative:      As a Marker for Sepsis (Non-Neonates):    1. <0.5 ng/mL represents a low risk of severe sepsis and/or septic shock.  2. >2 ng/mL represents a high risk of severe sepsis and/or septic shock.    As a Marker for Lower Respiratory Tract Infections that require antibiotic therapy:    PCT on Admission    Antibiotic Therapy       6-12 Hrs later    >0.5                Strongly Recommended  >0.25  "- <0.5        Recommended   0.1 - 0.25          Discouraged              Remeasure/reassess PCT  <0.1                Strongly Discouraged     Remeasure/reassess PCT    As 28 day mortality risk marker: \"Change in Procalcitonin Result\" (>80% or <=80%) if Day 0 (or Day 1) and Day 4 values are available. Refer to http://www.[facility]-pct-calculator.com    Change in PCT <=80%  A decrease of PCT levels below or equal to 80% defines a positive change in PCT test result representing a higher risk for 28-day all-cause mortality of patients diagnosed with severe sepsis for septic shock.    Change in PCT >80%  A decrease of PCT levels of more than 80% defines a negative change in PCT result representing a lower risk for 28-day all-cause mortality of patients diagnosed with severe sepsis or septic shock.       Lactic Acid, Plasma [[id number]]  (Normal) Collected: 05/11/22 0555    Specimen: Blood Updated: 05/11/22 0630     Lactate 1.4 mmol/L     CBC & Differential [[id number]]  (Abnormal) Collected: 05/11/22 0144    Specimen: Blood Updated: 05/11/22 0616    Narrative:      The following orders were created for panel order CBC & Differential.  Procedure                               Abnormality         Status                     ---------                               -----------         ------                     CBC Auto Differential[[id number]]        Abnormal            Final result               Scan Slide[[id number]]                                                                    Please view results for these tests on the individual orders.    CBC Auto Differential [[id number]]  (Abnormal) Collected: 05/11/22 0555    Specimen: Blood Updated: 05/11/22 0616     WBC 11.20 10*3/mm3      RBC 3.71 10*6/mm3      Hemoglobin 10.5 g/dL      Hematocrit 34.9 %      MCV 94.0 fL      MCH 28.3 pg      MCHC 30.1 g/dL      RDW 16.9 %      RDW-SD 56.4 fl      MPV 7.5 fL      Platelets 234 10*3/mm3      Neutrophil % 85.5 %      Lymphocyte % 6.6 %  "     Monocyte % 7.2 %      Eosinophil % 0.2 %      Basophil % 0.5 %      Neutrophils, Absolute 9.60 10*3/mm3      Lymphocytes, Absolute 0.70 10*3/mm3      Monocytes, Absolute 0.80 10*3/mm3      Eosinophils, Absolute 0.00 10*3/mm3      Basophils, Absolute 0.10 10*3/mm3      nRBC 0.0 /100 WBC           Imaging Results (Last 24 Hours)     Procedure Component Value Units Date/Time    XR Chest 1 View [[id number]] Collected: 05/11/22 0738     Updated: 05/11/22 0741    Narrative:      DATE OF EXAM:  5/11/2022 4:44 AM     PROCEDURE:  XR CHEST 1 VW-     INDICATIONS:  insertion of central venous catheter; N18.6-End stage renal disease;  Z99.2-Dependence on renal dialysis; R06.00-Dyspnea, unspecified;  Z91.15-Patient's noncompliance with renal dialysis; L10-Cavstka  effusion, not elsewhere classified; R78.81-Bacteremia; 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     FINDINGS:  Interval placement of a left IJ central venous line with the tip in the  cavoatrial junction region. No pneumothorax. Right IJ dialysis catheter  remains in place. Cardiomegaly with coronary artery stent again  demonstrated. Haziness of the lungs with indistinct vessels and right  larger than left pleural effusions, not significantly changed        Impression:         1. No pneumothorax status post central venous line placement  2. Stable appearance of pulmonary edema and bilateral pleural effusions     Electronically Signed By-Norman Paige On:5/11/2022 7:39 AM  This report was finalized on [id number] by  Norman Paige, .      LAB RESULTS (LAST 7 DAYS)    CBC  Results from last 7 days   Lab Units 05/12/22  0501 05/11/22  0555 05/11/22  0253 05/10/22  0706 05/09/22  0528 05/08/22  0446 05/07/22  0417 05/06/22  1045   WBC 10*3/mm3 7.20 11.20*  --   6.70 6.90 6.10 6.70 5.20   RBC 10*6/mm3 3.47* 3.71*  --  4.14 3.65* 3.68* 3.69* 3.78*   HEMOGLOBIN g/dL 10.3* 10.5*  --  11.9* 10.8* 10.9* 10.9* 11.0*   HEMOGLOBIN, POC g/dL  --   --  13.4  --   --   --   --   --    HEMATOCRIT % 32.7* 34.9*  --  39.4 34.6* 35.8* 35.7* 35.8*   HEMATOCRIT POC %  --   --  39  --   --   --   --   --    MCV fL 94.2 94.0  --  95.2 94.7 97.2* 96.6 94.5   PLATELETS 10*3/mm3 208 234  --  213 200 173 163 203       BMP  Results from last 7 days   Lab Units 05/12/22  0501 05/11/22  0555 05/10/22  0706 05/09/22 0228 05/08/22 0446 05/07/22 0417 05/06/22  1045   SODIUM mmol/L 133* 133* 135* 130* 133* 134* 138   POTASSIUM mmol/L 4.2 3.2* 4.1 4.0 4.0 3.9 3.8   CHLORIDE mmol/L 100 97* 97* 93* 97* 97* 95*   CO2 mmol/L 23.0 24.0 24.0 24.0 25.0 26.0 30.0*   BUN mg/dL 12 21 17 24* 19 12 15   CREATININE mg/dL 2.48* 3.41* 2.86* 3.39* 2.94* 2.24* 2.74*   GLUCOSE mg/dL 122* 167* 132* 133* 116* 164* 99   MAGNESIUM mg/dL 1.9 2.1  --  1.9 2.0  --   --    PHOSPHORUS mg/dL 2.0* 2.7  --  2.2* 2.2*  --   --        CMP   Results from last 7 days   Lab Units 05/12/22  0501 05/11/22  0555 05/10/22  0706 05/09/22 0228 05/08/22 0446 05/07/22 0417 05/06/22  1045   SODIUM mmol/L 133* 133* 135* 130* 133* 134* 138   POTASSIUM mmol/L 4.2 3.2* 4.1 4.0 4.0 3.9 3.8   CHLORIDE mmol/L 100 97* 97* 93* 97* 97* 95*   CO2 mmol/L 23.0 24.0 24.0 24.0 25.0 26.0 30.0*   BUN mg/dL 12 21 17 24* 19 12 15   CREATININE mg/dL 2.48* 3.41* 2.86* 3.39* 2.94* 2.24* 2.74*   GLUCOSE mg/dL 122* 167* 132* 133* 116* 164* 99   ALBUMIN g/dL 3.00* 2.80* 2.90*  --  2.60*  --  3.10*   BILIRUBIN mg/dL 0.3 0.3 0.4  --  0.4  --  0.7   ALK PHOS U/L 45 46 56  --  52  --  59   AST (SGOT) U/L 12 10 12  --  12  --  11   ALT (SGPT) U/L 7 8 6  --  5  --  5         BNP        TROPONIN  Results from last 7 days   Lab Units 05/06/22  1828   TROPONIN T ng/mL 0.311*       CoAg  Results from last 7 days   Lab Units 05/10/22  0706   INR  1.09       Creatinine  Clearance  Estimated Creatinine Clearance: 32.7 mL/min (A) (by C-G formula based on SCr of 2.48 mg/dL (H)).    ABG  Results from last 7 days   Lab Units 05/11/22  0253   PH, ARTERIAL pH units 7.362   PCO2, ARTERIAL mm Hg 47.0   PO2 ART mm Hg 100.1   O2 SATURATION ART % 97.4   BASE EXCESS ART mmol/L 0.7       Radiology  XR Chest 1 View    Result Date: 5/11/2022   1. No pneumothorax status post central venous line placement 2. Stable appearance of pulmonary edema and bilateral pleural effusions  Electronically Signed By-Norman Paige On:5/11/2022 7:39 AM This report was finalized on [id number] by  Norman Paige, .    XR Chest 1 View    Result Date: 5/11/2022  Midline sternotomy wires are present. Right dual-lumen central venous catheter is unchanged. The cardiac silhouette is moderately to significantly enlarged and there is worsening interstitial and airspace changes bilaterally which is likely related to a component of edema. There also appears to be moderate bilateral pleural effusions which are very similar to the previous examination. Superimposed infection would be difficult to exclude. Electronically signed by:  Sal Nieto D.O.  5/11/2022 12:08 AM          EKG                      I personally viewed and interpreted the patient's EKG/Telemetry data:    ECHOCARDIOGRAM:    Results for orders placed during the hospital encounter of 05/06/22    Adult Transthoracic Echo Complete w/ Color, Spectral and Contrast if Necessary Per Protocol    Interpretation Summary  Severe global left ventricular hypokinesis, estimated LV ejection fraction of 20%.  Mild right ventricular enlargement  Moderate left atrial enlargement  Aortic valve not well visualized in short axis.  Dopplers do not reveal any abnormality but  Mitral valve, tricuspid valve appears structurally normal, while mitral and tricuspid regurgitation seen.  Calculated RV systolic pressure of 30 mmHg  No pericardial effusion seen.  Proximal aorta appears  normal in size.          STRESS MYOVIEW:    Cardiolite (Tc-99m Sestamibi) stress test    CARDIAC CATHETERIZATION:            OTHER:         Assessment & Plan     Principal Problem:    Chronic systolic (congestive) heart failure (HCC)  Active Problems:    S/P CABG (coronary artery bypass graft)    Primary hypertension    Elevated troponin    ANNETTE treated with BiPAP    Major depressive disorder, recurrent, mild (HCC)    Mixed hyperlipidemia    Flaccid hemiplegia affecting right dominant side (HCC)    Diabetic neuropathy (HCC)    Chronic obstructive pulmonary disease with (acute) exacerbation (HCC)    Anemia of renal disease    End stage renal disease ([facility])    Chronic respiratory failure with hypoxia, on home oxygen therapy ([facility])    Other specified hypothyroidism    Fluid overload    Stage 5 chronic kidney disease on chronic dialysis ([facility])    Bacteremia    Normocytic anemia due to blood loss    Cardiopulmonary arrest with successful resuscitation ([facility])    Ventricular fibrillation ([facility])      [[[[[[[[[[[[[[[[[[[[[[[[[    Impression  ==============     -Shortness of breath     - Positive blood cultures for Streptococcus 2 out of 2 sets.  Rule out tunneled catheter infection.     -Acute on chronic fluid overload.      -status post CABG 2005. status post stent to LAD 2009    Status post stent to LAD 11/13/2015  Status post stent to mid LAD and SVG to marginal branch 09/16/2016.  Status post stent to mid LAD 5/24/2021  Status post stent to SVG to RCA 5/26/2021     Cardiac catheterization 5/24/2021 revealed  Left ventricle angiogram was not performed due to pre-existing renal dysfunction.  Left main coronary artery normal.  Left anterior descending artery has mid segment lengthy 90% disease within the previously placed stent.  Distal left into descending artery has diffuse disease.  Circumflex coronary artery is totally occluded.  (Chronic)  Right coronary artery is chronically occluded.  SVG to marginal branch (jump graft to  OM1 and OM 2) is totally occluded (new finding compared to 2015.  SVG to PDA has distal radiolucency.  However no definite obstructive disease is present.       -status post myocardial infarction 2000 and 2002 .      -history of intermittent but mostly persistent atrial fibrillation     -Acute on chronic congestive heart failure.  Compensated at this time.     Recent echocardiogram showed left ventricle dysfunction with ejection fraction of 35%.     -History of right-sided weakness with left MCA territory stroke.-Improved     Transesophageal echocardiogram 11/30/2020.  Biatrial enlargement.  Smoking effect in the left atrium and left ventricle and left atrial appendage.  Mild mitral and aortic regurgitation.  Left ventricle enlargement with diffuse hypocontractility with ejection fraction of 35 to 40%.     Echocardiogram 11/27/2020 revealed left atrial enlargement left ventricle dysfunction with ejection fraction of 40%.  Negative bubble study.      -diabetes hypertension and sleep apnea.  ESRD.     -status post colon surgery (partial colectomy) appendectomy cholecystectomy right 4th toe removal and carpal tunnel surgery      -continued smoking 1 pack per day -abstinence from smoking      -allergy to codeine.  ============   Plan  ================  Shortness of breath  Positive blood cultures for Streptococcus 2 out of 2 sets.  Rule out tunneled catheter infection..  Review echocardiogram     Status post CABG  Patient is not having any angina pectoris or congestive heart failure     Atrial fibrillation-chronic  Rate is better controlled  Patient is on Coreg at 12.5 mg p.o. twice a day amiodarone and Cardizem.      ESRD  Patient is undergoing dialysis.      Status post stent to LAD 5/24/2021.  Status post stent to SVG to RCA 5/26/2021.    Hypokalemia  k 3.2-supplements.       Anticoagulation  Patient was on Eliquis 5 mg twice a day.  Observe for toxic effects.  Eliquis is on hold     Echocardiogram 3/12/2022 revealed  ejection fraction of 25 to 30%.    Patient had CODE BLUE yesterday.  Patient apparently was in V. fib and was defibrillated.  Patient was apparently below and unconscious.  Patient gradually improved his mentation and patient was not intubated.  Patient apparently continued to be oxygenated well.  Amiodarone was ordered.  Patient was transferred to ICU.     Consider biventricular ICD if left ventricular function does not improve or congestive heart failure gets worse.    Staphylococcal infection    In view of recent episode of ventricular fibrillation etc. patient would benefit from cardiac catheterization and coronary arteriography.  Risks and benefits pros and cons of the procedure including infection bleeding blood clot heart attack stroke allergic reaction to the dye renal dysfunction etc. were discussed.    Have discussed with attending nurse for coordination of care.    Patient is scheduled to have cardiac catheter tomorrow afternoon.    Follow-up labs ordered.     Further plan will depend on patient's progress.   ]]]]]]]]]]]]]]]]]]]]]]              Jose G Rm MD  05/12/22  06:05 EDT

## 2022-05-13 NOTE — CASE MANAGEMENT/SOCIAL WORK
Continued Stay Note  DOREEN Brennan     Patient Name: Benson Mclean  MRN: [id number]  Today's Date: 5/13/2022    Admit Date: 5/6/2022     Discharge Plan     Row Name 05/13/22 1221       Plan    Plan Return to [facility], will return skilled for IV abx. No PASRR required. Precert started 5/13, does not have to delay d/c. Current OP HD at [facility].    Plan Comments Confirmed with [facility] liaison at [facility] that since patient will need IV abx on d/c patient will return skilled and she will submit for precert today. She confirms since patient is a LTC resident, precert does not have to delay d/c.              Phone communication or documentation only - no physical contact with patient or family.        Caron Rodriguez RN

## 2022-05-13 NOTE — PROGRESS NOTES
Palliative Care Social Work Progress Note    Code Status:full code    Goals of Care: Full Treatment    Narrative: Palliative care  attempted to meet with pt this AM, but was leaving the floor to get a heart cath and MARSHA.  RN reports that pt was being somewhat difficult this morning and not wanting to do these procedures, but eventually relented once she explained their importance.  I also received a message from RN to visit with pt's wife regarding concerns she had about the pt's care.  Visit made to wife, and she reports feeling bad today.  She discussed her own healthcare problems, and her feelings that she is afraid that the pt will become sicker without anyone to advocate for him if something were to happen to her.  Emotional support provided.  Messaged wife's hospitalist in an attempt to gain a consult on wife to address her issues as well.      Plan:  Will continue to follow.          FRANCIA Rodriguez

## 2022-05-13 NOTE — PROGRESS NOTES
Referring Provider: Kasandra Ocampo MD    Reason for follow-up:  Shortness of breath  Recent ventricular fibrillation.  Bacteremia       Patient Care Team:  Rudolph Rooney MD as PCP - General (Family Medicine)  Samantha Zabala APRN as PCP - Family Medicine  Jose G Rm MD as Consulting Physician (Cardiology)    Subjective .      ROS    Since I have last seen, the patient has been without any chest discomfort ,shortness of breath, palpitations, dizziness or syncope.  Denies having any headache ,abdominal pain ,nausea, vomiting , diarrhea constipation, loss of weight or loss of appetite.  Denies having any excessive bruising ,hematuria or blood in the stool.    Review of all systems negative except as indicated    History  Past Medical History:   Diagnosis Date   • A-fib ([facility]) 8/3/2021   • Anemia    • Appetite loss    • Arthritis    • Brain bleed ([facility])    • Carpal tunnel syndrome on left    • CHF (congestive heart failure) ([facility])    • Chronic left-sided low back pain without sciatica 12/27/2017   • CKD (chronic kidney disease) stage 3, GFR 30-59 ml/min ([facility])    • Closed fracture of seventh thoracic vertebra ([facility]) 8/23/2020   • Cognitive communication deficit 12/1/2020   • COPD (chronic obstructive pulmonary disease) ([facility])    • Coronary artery disease    • COVID-19 2/19/2021   • Cytokine release syndrome, grade 1 5/24/2021   • Depression    • Diabetic neuropathy ([facility])    • Dialysis patient ([facility])     mon wed fri   • DM2 (diabetes mellitus, type 2) ([facility])    • GERD (gastroesophageal reflux disease)    • Hyperlipidemia    • Hypertension    • Hypogonadism in male    • Neuropathy    • Nonsustained ventricular tachycardia ([facility]) 7/23/2021   • Obesity    • Paroxysmal atrial fibrillation ([facility]) 12/1/2020   • Sleep apnea    • Stroke ([facility])    • Unsteady gait        Past Surgical History:   Procedure Laterality Date   • ANGIOPLASTY      X2   • APPENDECTOMY     • ARTERIOVENOUS FISTULA/SHUNT SURGERY Left 1/20/2022    Procedure:  LEFT BRACHIAL CEPHALIC ARTERIAL VENOUS FISTULA CREATION;  Surgeon: Yony Davila MD;  Location: [facility] MAIN OR;  Service: Vascular;  Laterality: Left;   • CARDIAC CATHETERIZATION  11/13/2015   • CARDIAC CATHETERIZATION N/A 5/24/2021    Procedure: Left Heart Cath and coronary angiogram;  Surgeon: Jose G Rm MD;  Location:  [facility] CATH INVASIVE LOCATION;  Service: Cardiovascular;  Laterality: N/A;   • CARDIAC CATHETERIZATION  5/24/2021    Procedure: Saphenous Vein Graft;  Surgeon: Jose G Rm MD;  Location:  [facility] CATH INVASIVE LOCATION;  Service: Cardiovascular;;   • CARDIAC CATHETERIZATION N/A 5/24/2021    Procedure: Percutaneous Coronary Intervention;  Surgeon: Bernabe Zambrano MD;  Location:  [facility] CATH INVASIVE LOCATION;  Service: Cardiology;  Laterality: N/A;   • CARDIAC CATHETERIZATION N/A 5/24/2021    Procedure: Stent NIELS coronary;  Surgeon: Bernabe Zambrano MD;  Location:  [facility] CATH INVASIVE LOCATION;  Service: Cardiology;  Laterality: N/A;   • CARDIAC CATHETERIZATION N/A 5/26/2021    Procedure: Percutaneous Coronary Intervention;  Surgeon: Bernabe Zambrano MD;  Location:  [facility] CATH INVASIVE LOCATION;  Service: Cardiovascular;  Laterality: N/A;   • CARDIAC CATHETERIZATION N/A 5/26/2021    Procedure: Stent NIELS bypass graft;  Surgeon: Bernabe Zambrano MD;  Location:  [facility] CATH INVASIVE LOCATION;  Service: Cardiovascular;  Laterality: N/A;   • CARDIAC ELECTROPHYSIOLOGY PROCEDURE N/A 5/24/2021    Procedure: Temporary Pacemaker;  Surgeon: Bernabe Zambrano MD;  Location: [facility] CATH INVASIVE LOCATION;  Service: Cardiology;  Laterality: N/A;   • CARDIAC ELECTROPHYSIOLOGY PROCEDURE N/A 5/26/2021    Procedure: Temporary Pacemaker;  Surgeon: Bernabe Zambrano MD;  Location:  [facility] CATH INVASIVE LOCATION;  Service: Cardiovascular;  Laterality: N/A;   • CARPAL TUNNEL RELEASE Left 04/29/2018    carpal tunnel- lt hand// other hand surgeries    • CATARACT EXTRACTION, BILATERAL  2002    Dr. Lux Acosta   •  CHOLECYSTECTOMY     • COLON RESECTION  2005   • COLONOSCOPY N/A 5/10/2022    Procedure: COLONOSCOPY with polypectomy x3;  Surgeon: Herbie Keane MD;  Location: [facility] ENDOSCOPY;  Service: Gastroenterology;  Laterality: N/A;  colon polyps;internal hemorrhoids   • CORONARY ANGIOPLASTY     • CORONARY ANGIOPLASTY WITH STENT PLACEMENT  11/13/2015    PTCA stent to proximal in stent and mid to distal lad   • CORONARY ANGIOPLASTY WITH STENT PLACEMENT  09/16/2016    PTCA stent to mid lad and stent to vein graft to marginal   • CORONARY ARTERY BYPASS GRAFT  2005    @ [facility]   • CYST REMOVAL      cyst removed from scrotum   • FOOT SURGERY Right 07/17/2018   • FOOT SURGERY Left 02/04/2019   • PORTACATH PLACEMENT     • TOE AMPUTATION Right     right toe removed D/T infected cut that went to the bone       Family History   Problem Relation Age of Onset   • Heart disease Mother    • Heart disease Father    • Diabetes Sister    • Heart disease Sister    • Diabetes Brother    • Mental illness Brother        Social History     Tobacco Use   • Smoking status: Former Smoker     Packs/day: 1.00     Years: 60.00     Pack years: 60.00     Types: Cigarettes   • Smokeless tobacco: Never Used   • Tobacco comment: Patient quit smoking 11/2020   Vaping Use   • Vaping Use: Never used   Substance Use Topics   • Alcohol use: No   • Drug use: Yes     Frequency: 1.0 times per week     Types: Marijuana     Comment: socially        Medications Prior to Admission   Medication Sig Dispense Refill Last Dose   • albuterol sulfate  (90 Base) MCG/ACT inhaler Inhale 2 puffs Every 4 (Four) Hours.   5/6/2022 at Unknown time   • apixaban (ELIQUIS) 5 MG tablet tablet Take 1 tablet by mouth Every 12 (Twelve) Hours. Indications: Atrial Fibrillation 60 tablet  5/6/2022 at Unknown time   • atorvastatin (LIPITOR) 40 MG tablet Take 40 mg by mouth Every Night.   5/5/2022 at Unknown time   • budesonide (Pulmicort) 0.5 MG/2ML nebulizer solution Take 2 mL by  nebulization 2 (Two) Times a Day.   5/6/2022 at Unknown time   • cholecalciferol (VITAMIN D3) 25 MCG (1000 UT) tablet Take 1,000 Units by mouth Daily.   5/6/2022 at Unknown time   • docusate sodium (COLACE) 100 MG capsule Take 100 mg by mouth Daily.   5/6/2022 at Unknown time   • donepezil (ARICEPT) 10 MG tablet Take 10 mg by mouth Every Night.   5/5/2022 at Unknown time   • fluticasone (FLONASE) 50 MCG/ACT nasal spray 2 sprays by Each Nare route 2 (Two) Times a Day.   5/6/2022 at Unknown time   • guaiFENesin (MUCINEX) 600 MG 12 hr tablet Take 600 mg by mouth 2 (Two) Times a Day.   5/6/2022 at Unknown time   • HYDROcodone-acetaminophen (NORCO) 5-325 MG per tablet Take 1 tablet by mouth 3 (Three) Times a Day.   5/6/2022 at Unknown time   • ipratropium-albuterol (DUO-NEB) 0.5-2.5 mg/3 ml nebulizer Take 3 mL by nebulization 3 (Three) Times a Day. 360 mL  5/6/2022 at Unknown time   • ipratropium-albuterol (DUO-NEB) 0.5-2.5 mg/3 ml nebulizer Take 3 mL by nebulization Every 4 (Four) Hours As Needed for Wheezing. 360 mL  5/6/2022 at Unknown time   • levothyroxine (SYNTHROID, LEVOTHROID) 50 MCG tablet Take 50 mcg by mouth Every Morning.   5/6/2022 at Unknown time   • Metoprolol Tartrate 37.5 MG tablet Take 37.5 mg by mouth 2 (Two) Times a Day. Hold for SBP <110 or HR < 60   5/6/2022 at Unknown time   • midodrine (PROAMATINE) 10 MG tablet Take 10 mg by mouth 3 (Three) Times a Day Before Meals. Hold for BP > 140/90   5/6/2022 at Unknown time   • omeprazole (priLOSEC) 20 MG capsule Take 20 mg by mouth Daily.   5/6/2022 at Unknown time   • polyethylene glycol (MiraLax) 17 g packet Take 17 g by mouth 2 (Two) Times a Day.   5/6/2022 at Unknown time   • sertraline (ZOLOFT) 50 MG tablet Take 50 mg by mouth Daily.   5/6/2022 at Unknown time   • vitamin B-12 (CYANOCOBALAMIN) 1000 MCG tablet Take 1,000 mcg by mouth Daily.   5/6/2022 at Unknown time   • bisacodyl (DULCOLAX) 10 MG suppository Insert 10 mg into the rectum Daily As Needed  for Constipation.   Unknown at Unknown time   • magnesium hydroxide (MILK OF MAGNESIA) 400 MG/5ML suspension Take 30 mL by mouth Daily As Needed for Constipation.   Unknown at Unknown time   • ondansetron (ZOFRAN) 4 MG tablet Take 4 mg by mouth Every 8 (Eight) Hours As Needed for Nausea or Vomiting.   Unknown at Unknown time   • phenylephrine-mineral oil-petrolatum (Preparation H) 0.25-14-74.9 % ointment hemorrhoidal ointment Insert 1 application into the rectum Daily As Needed.   Unknown at Unknown time       Allergies  Codeine    Scheduled Meds:amiodarone, 200 mg, Oral, Q24H  atorvastatin, 40 mg, Oral, Nightly  budesonide, 0.5 mg, Nebulization, BID  cefTRIAXone, 2 g, Intravenous, Q24H  donepezil, 10 mg, Oral, Nightly  enoxaparin, 1 mg/kg, Subcutaneous, Q24H  insulin lispro, 0-7 Units, Subcutaneous, TID AC  ipratropium-albuterol, 3 mL, Nebulization, TID - RT  levothyroxine, 50 mcg, Oral, Q AM  lidocaine, 20 mL, Intradermal, Once  metoprolol tartrate, 25 mg, Oral, BID  midodrine, 15 mg, Oral, TID AC  pantoprazole, 40 mg, Oral, QAM  polyethylene glycol, 17 g, Oral, BID  sertraline, 50 mg, Oral, Daily  sodium chloride, 10 mL, Intravenous, Q12H  sodium chloride, 10 mL, Intravenous, Q12H  sorbitol, 50 mL, Oral, Daily      Continuous Infusions:   PRN Meds:.•  acetaminophen **OR** acetaminophen **OR** acetaminophen  •  aluminum-magnesium hydroxide-simethicone  •  dextrose  •  dextrose  •  glucagon (human recombinant)  •  insulin lispro **AND** insulin lispro  •  ipratropium-albuterol  •  melatonin  •  Morphine  •  ondansetron **OR** ondansetron  •  oxyCODONE  •  sodium chloride  •  sodium chloride  •  sodium chloride    Objective     VITAL SIGNS  Vitals:    05/13/22 0144 05/13/22 0514 05/13/22 0638 05/13/22 0641   BP: 100/64 110/59     BP Location: Right arm Right arm     Patient Position: Lying Lying     Pulse: 66 64 63 62   Resp: 20 16 16 16   Temp: 97.4 °F (36.3 °C) 98.2 °F (36.8 °C)     TempSrc: Oral Oral     SpO2:  "100% 100% 100% 100%   Weight:  101 kg (223 lb 1.7 oz)     Height:           Flowsheet Rows    Flowsheet Row First Filed Value   Admission Height 177.8 cm (70\") Documented at 05/06/2022 1026   Admission Weight 104 kg (229 lb 0.9 oz) Documented at 05/06/2022 1026            Intake/Output Summary (Last 24 hours) at 5/13/2022 0658  Last data filed at 5/13/2022 0514  Gross per 24 hour   Intake 760 ml   Output 0 ml   Net 760 ml        TELEMETRY: Atrial fibrillation    Physical Exam:  The patient is alert, oriented and in no distress.  Vital signs as noted above.  Exogenous obesity.  Head and neck revealed no carotid bruits or jugular venous distention.  No thyromegaly or lymphadenopathy is present  Lungs clear.  No wheezing.  Breath sounds are normal bilaterally.  Heart normal first and second heart sounds.  No murmur. No precordial rub is present.  No gallop is present.  Abdomen soft and nontender.  No organomegaly is present.  Extremities with good peripheral pulses without any pedal edema.  Skin warm and dry.  Musculoskeletal system is grossly normal  CNS grossly normal      Results Review:   I reviewed the patient's new clinical results.  Lab Results (last 24 hours)     Procedure Component Value Units Date/Time    CBC & Differential [[id number]]  (Abnormal) Collected: 05/13/22 0408    Specimen: Blood Updated: 05/13/22 0516    Narrative:      The following orders were created for panel order CBC & Differential.  Procedure                               Abnormality         Status                     ---------                               -----------         ------                     CBC Auto Differential[[id number]]        Abnormal            Final result                 Please view results for these tests on the individual orders.    CBC Auto Differential [[id number]]  (Abnormal) Collected: 05/13/22 0408    Specimen: Blood Updated: 05/13/22 0516     WBC 6.70 10*3/mm3      RBC 3.58 10*6/mm3      Hemoglobin 10.4 g/dL      " Hematocrit 34.3 %      MCV 96.0 fL      MCH 29.1 pg      MCHC 30.3 g/dL      RDW 17.8 %      RDW-SD 61.7 fl      MPV 7.9 fL      Platelets 211 10*3/mm3      Neutrophil % 67.2 %      Lymphocyte % 19.7 %      Monocyte % 10.8 %      Eosinophil % 1.4 %      Basophil % 0.9 %      Neutrophils, Absolute 4.50 10*3/mm3      Lymphocytes, Absolute 1.30 10*3/mm3      Monocytes, Absolute 0.70 10*3/mm3      Eosinophils, Absolute 0.10 10*3/mm3      Basophils, Absolute 0.10 10*3/mm3      nRBC 0.1 /100 WBC     BNP [[id number]]  (Abnormal) Collected: 05/13/22 0408    Specimen: Blood Updated: 05/13/22 0511     proBNP >70,000.0 pg/mL     Narrative:      Among patients with dyspnea, NT-proBNP is highly sensitive for the detection of acute congestive heart failure. In addition NT-proBNP of <300 pg/ml effectively rules out acute congestive heart failure with 99% negative predictive value.    Results may be falsely decreased if patient taking Biotin.      Phosphorus [[id number]]  (Normal) Collected: 05/13/22 0408    Specimen: Blood Updated: 05/13/22 0458     Phosphorus 3.2 mg/dL     Troponin [[id number]]  (Abnormal) Collected: 05/13/22 0408    Specimen: Blood Updated: 05/13/22 0456     Troponin T 0.583 ng/mL     Narrative:      Troponin T Reference Range:  <= 0.03 ng/mL-   Negative for AMI  >0.03 ng/mL-     Abnormal for myocardial necrosis.  Clinicians would have to utilize clinical acumen, EKG, Troponin and serial changes to determine if it is an Acute Myocardial Infarction or myocardial injury due to an underlying chronic condition.       Results may be falsely decreased if patient taking Biotin.      Comprehensive Metabolic Panel [[id number]]  (Abnormal) Collected: 05/13/22 0408    Specimen: Blood Updated: 05/13/22 0454     Glucose 97 mg/dL      BUN 16 mg/dL      Creatinine 2.90 mg/dL      Sodium 133 mmol/L      Potassium 4.4 mmol/L      Chloride 98 mmol/L      CO2 25.0 mmol/L      Calcium 8.2 mg/dL      Total Protein 5.7 g/dL      Albumin  2.90 g/dL      ALT (SGPT) 6 U/L      AST (SGOT) 11 U/L      Alkaline Phosphatase 49 U/L      Total Bilirubin 0.3 mg/dL      Globulin 2.8 gm/dL      A/G Ratio 1.0 g/dL      BUN/Creatinine Ratio 5.5     Anion Gap 10.0 mmol/L      eGFR 22.4 mL/min/1.73      Comment: National Kidney Foundation and American Society of Nephrology (ASN) Task Force recommended calculation based on the Chronic Kidney Disease Epidemiology Collaboration (CKD-EPI) equation refit without adjustment for race.       Narrative:      GFR Normal >60  Chronic Kidney Disease <60  Kidney Failure <15      Magnesium [[id number]]  (Normal) Collected: 05/13/22 0408    Specimen: Blood Updated: 05/13/22 0454     Magnesium 2.3 mg/dL     Protime-INR [[id number]]  (Abnormal) Collected: 05/13/22 0408    Specimen: Blood Updated: 05/13/22 0452     Protime 11.5 Seconds      INR 1.12    POC Glucose Once [[id number]]  (Normal) Collected: 05/12/22 1712    Specimen: Blood Updated: 05/12/22 1712     Glucose 94 mg/dL      Comment: Serial Number: [id number]:  [id number]       Blood Culture - Blood, Hand, Right [[id number]]  (Normal) Collected: 05/07/22 1519    Specimen: Blood from Hand, Right Updated: 05/12/22 1617     Blood Culture No growth at 5 days    Tissue Pathology Exam [[id number]] Collected: 05/10/22 1402    Specimen: Polyp from Large Intestine, Left / Descending Colon; Polyp from Large Intestine, Right / Ascending Colon Updated: 05/12/22 1247     Case Report --     Surgical Pathology Report                         Case: [id number]                                  Authorizing Provider:  Herbie Keane MD        Collected:           05/10/2022 02:02 PM          Ordering Location:     [facility]  Received:            05/11/2022 09:38 AM                                 SUITES                                                                       Pathologist:           Brayan Zuñiga MD                                                            "  Specimens:   1) - Large Intestine, Left / Descending Colon, descending colon polyp x2                            2) - Large Intestine, Right / Ascending Colon, ascending colon polyp x1                     Final Diagnosis --     Specimen #1 (Colon, descending, polypectomy):    Fragments of tubular adenoma     Specimen #2 (Colon, ascending, polypectomy):    Fragments of tubular adenoma     R/[facility]        Gross Description --     1. Large Intestine, Left / Descending Colon.  Received in formalin designated \"Descending x2\" are several fragments of tan tissue measuring up to 0.3 cm in greatest dimension, submitted in one cassette.        2. Large Intestine, Right / Ascending Colon.  Received in formalin designated \"Ascending x1\" are a few fragments of tan tissue measuring up to 0.6 cm in greatest dimension, submitted in one cassette.      [facility]         POC Glucose Once [[id number]]  (Normal) Collected: 05/12/22 1133    Specimen: Blood Updated: 05/12/22 1134     Glucose 102 mg/dL      Comment: Serial Number: [id number]:  [id number]       Magnesium [[id number]]  (Normal) Collected: 05/12/22 0949    Specimen: Blood Updated: 05/12/22 1006     Magnesium 2.2 mg/dL     POC Glucose Once [[id number]]  (Abnormal) Collected: 05/12/22 0800    Specimen: Blood Updated: 05/12/22 0801     Glucose 106 mg/dL      Comment: Serial Number: [id number]:  [id number]       SKYE AURIS SCREEN - Swab, Axilla Right, Axilla Left and Groin [[id number]]  (Normal) Collected: 05/07/22 1020    Specimen: Swab from Axilla Right, Axilla Left and Groin Updated: 05/12/22 0739     Candida Auris Screen Culture No Candida auris isolated at 5 days          Imaging Results (Last 24 Hours)     ** No results found for the last 24 hours. **      LAB RESULTS (LAST 7 DAYS)    CBC  Results from last 7 days   Lab Units 05/13/22  0408 05/12/22  0501 05/11/22  0555 05/11/22  0253 05/10/22  0706 05/09/22  0528 05/08/22  0446 05/07/22  0417   WBC 10*3/mm3 6.70 " 7.20 11.20*  --  6.70 6.90 6.10 6.70   RBC 10*6/mm3 3.58* 3.47* 3.71*  --  4.14 3.65* 3.68* 3.69*   HEMOGLOBIN g/dL 10.4* 10.3* 10.5*  --  11.9* 10.8* 10.9* 10.9*   HEMOGLOBIN, POC g/dL  --   --   --  13.4  --   --   --   --    HEMATOCRIT % 34.3* 32.7* 34.9*  --  39.4 34.6* 35.8* 35.7*   HEMATOCRIT POC %  --   --   --  39  --   --   --   --    MCV fL 96.0 94.2 94.0  --  95.2 94.7 97.2* 96.6   PLATELETS 10*3/mm3 211 208 234  --  213 200 173 163       BMP  Results from last 7 days   Lab Units 05/13/22 0408 05/12/22  0949 05/12/22  0501 05/11/22  0555 05/10/22  0706 05/09/22  0228 05/08/22  0446 05/07/22  0417   SODIUM mmol/L 133*  --  133* 133* 135* 130* 133* 134*   POTASSIUM mmol/L 4.4  --  4.2 3.2* 4.1 4.0 4.0 3.9   CHLORIDE mmol/L 98  --  100 97* 97* 93* 97* 97*   CO2 mmol/L 25.0  --  23.0 24.0 24.0 24.0 25.0 26.0   BUN mg/dL 16  --  12 21 17 24* 19 12   CREATININE mg/dL 2.90*  --  2.48* 3.41* 2.86* 3.39* 2.94* 2.24*   GLUCOSE mg/dL 97  --  122* 167* 132* 133* 116* 164*   MAGNESIUM mg/dL 2.3 2.2 1.9 2.1  --  1.9 2.0  --    PHOSPHORUS mg/dL 3.2  --  2.0* 2.7  --  2.2* 2.2*  --        CMP   Results from last 7 days   Lab Units 05/13/22 0408 05/12/22  0501 05/11/22  0555 05/10/22  0706 05/09/22  0228 05/08/22  0446 05/07/22  0417 05/06/22  1045   SODIUM mmol/L 133* 133* 133* 135* 130* 133* 134* 138   POTASSIUM mmol/L 4.4 4.2 3.2* 4.1 4.0 4.0 3.9 3.8   CHLORIDE mmol/L 98 100 97* 97* 93* 97* 97* 95*   CO2 mmol/L 25.0 23.0 24.0 24.0 24.0 25.0 26.0 30.0*   BUN mg/dL 16 12 21 17 24* 19 12 15   CREATININE mg/dL 2.90* 2.48* 3.41* 2.86* 3.39* 2.94* 2.24* 2.74*   GLUCOSE mg/dL 97 122* 167* 132* 133* 116* 164* 99   ALBUMIN g/dL 2.90* 3.00* 2.80* 2.90*  --  2.60*  --  3.10*   BILIRUBIN mg/dL 0.3 0.3 0.3 0.4  --  0.4  --  0.7   ALK PHOS U/L 49 45 46 56  --  52  --  59   AST (SGOT) U/L 11 12 10 12  --  12  --  11   ALT (SGPT) U/L 6 7 8 6  --  5  --  5         BNP        TROPONIN  Results from last 7 days   Lab Units  05/13/22  0408   TROPONIN T ng/mL 0.583*       CoAg  Results from last 7 days   Lab Units 05/13/22  0408 05/10/22  0706   INR  1.12* 1.09       Creatinine Clearance  Estimated Creatinine Clearance: 27.8 mL/min (A) (by C-G formula based on SCr of 2.9 mg/dL (H)).    ABG  Results from last 7 days   Lab Units 05/11/22  0253   PH, ARTERIAL pH units 7.362   PCO2, ARTERIAL mm Hg 47.0   PO2 ART mm Hg 100.1   O2 SATURATION ART % 97.4   BASE EXCESS ART mmol/L 0.7       Radiology  No radiology results for the last day        EKG                      I personally viewed and interpreted the patient's EKG/Telemetry data: Atrial fibrillation    ECHOCARDIOGRAM:    Results for orders placed during the hospital encounter of 05/06/22    Adult Transthoracic Echo Complete w/ Color, Spectral and Contrast if Necessary Per Protocol    Interpretation Summary  Severe global left ventricular hypokinesis, estimated LV ejection fraction of 20%.  Mild right ventricular enlargement  Moderate left atrial enlargement  Aortic valve not well visualized in short axis.  Dopplers do not reveal any abnormality but  Mitral valve, tricuspid valve appears structurally normal, while mitral and tricuspid regurgitation seen.  Calculated RV systolic pressure of 30 mmHg  No pericardial effusion seen.  Proximal aorta appears normal in size.          STRESS MYOVIEW:    Cardiolite (Tc-99m Sestamibi) stress test    CARDIAC CATHETERIZATION:            OTHER:         Assessment & Plan     Principal Problem:    Chronic systolic (congestive) heart failure (HCC)  Active Problems:    S/P CABG (coronary artery bypass graft)    Primary hypertension    Elevated troponin    ANNETTE treated with BiPAP    Major depressive disorder, recurrent, mild (HCC)    Mixed hyperlipidemia    Flaccid hemiplegia affecting right dominant side (HCC)    Diabetic neuropathy (HCC)    Chronic obstructive pulmonary disease with (acute) exacerbation (HCC)    Anemia of renal disease    End stage renal  disease (HCC)    Chronic respiratory failure with hypoxia, on home oxygen therapy (HCC)    Other specified hypothyroidism    Fluid overload    Stage 5 chronic kidney disease on chronic dialysis (HCC)    Bacteremia    Normocytic anemia due to blood loss    Essential (primary) hypertension    Cardiopulmonary arrest with successful resuscitation ([facility])    Ventricular fibrillation (HCC)      [[[[[[[[[[[[[[[[[[[[[[[[[    Impression  ==============  -Shortness of breath     - Positive blood cultures for Streptococcus 2 out of 2 sets.  Rule out tunneled catheter infection.     -Acute on chronic fluid overload.      -status post CABG 2005. status post stent to LAD 2009    Status post stent to LAD 11/13/2015  Status post stent to mid LAD and SVG to marginal branch 09/16/2016.  Status post stent to mid LAD 5/24/2021  Status post stent to SVG to RCA 5/26/2021     Cardiac catheterization 5/24/2021 revealed  Left ventricle angiogram was not performed due to pre-existing renal dysfunction.  Left main coronary artery normal.  Left anterior descending artery has mid segment lengthy 90% disease within the previously placed stent.  Distal left into descending artery has diffuse disease.  Circumflex coronary artery is totally occluded.  (Chronic)  Right coronary artery is chronically occluded.  SVG to marginal branch (jump graft to OM1 and OM 2) is totally occluded (new finding compared to 2015.  SVG to PDA has distal radiolucency.  However no definite obstructive disease is present.       -status post myocardial infarction 2000 and 2002 .      -history of intermittent but mostly persistent atrial fibrillation     -Acute on chronic congestive heart failure.  Compensated at this time.     Recent echocardiogram showed left ventricle dysfunction with ejection fraction of 35%.     -History of right-sided weakness with left MCA territory stroke.-Improved     Transesophageal echocardiogram 11/30/2020.  Biatrial enlargement.  Smoking effect  in the left atrium and left ventricle and left atrial appendage.  Mild mitral and aortic regurgitation.  Left ventricle enlargement with diffuse hypocontractility with ejection fraction of 35 to 40%.     Echocardiogram 11/27/2020 revealed left atrial enlargement left ventricle dysfunction with ejection fraction of 40%.  Negative bubble study.      -diabetes hypertension and sleep apnea.  ESRD.     -status post colon surgery (partial colectomy) appendectomy cholecystectomy right 4th toe removal and carpal tunnel surgery      -continued smoking 1 pack per day -abstinence from smoking      -allergy to codeine.  ============   Plan  ================  Shortness of breath  Positive blood cultures for Streptococcus 2 out of 2 sets.  Rule out tunneled catheter infection..     Status post CABG  Patient is not having any angina pectoris or congestive heart failure     Atrial fibrillation-chronic  Rate is better controlled  Patient is on Coreg at 12.5 mg p.o. twice a day amiodarone and Cardizem.      ESRD  Patient is undergoing dialysis.      Status post stent to LAD 5/24/2021.  Status post stent to SVG to RCA 5/26/2021.    Hypokalemia  k 3.2-supplements.       Anticoagulation  Patient was on Eliquis 5 mg twice a day.  Observe for toxic effects.  Eliquis is on hold     Echocardiogram 3/12/2022 revealed ejection fraction of 25 to 30%.    Patient had CODE BLUE yesterday.  Patient apparently was in V. fib and was defibrillated.  Patient was apparently below and unconscious.  Patient gradually improved his mentation and patient was not intubated.  Patient apparently continued to be oxygenated well.  Amiodarone was ordered.  Patient was transferred to ICU.     Consider biventricular ICD if left ventricular function does not improve or congestive heart failure gets worse.    Staphylococcal infection  Patient to have transesophageal echocardiogram.  Risks and benefits pros and cons of the procedure including infection bleeding  esophageal trauma anesthetic risks etc. were discussed with patient.    In view of recent episode of ventricular fibrillation etc. patient would benefit from cardiac catheterization and coronary arteriography.  Risks and benefits pros and cons of the procedure including infection bleeding blood clot heart attack stroke allergic reaction to the dye renal dysfunction etc. were discussed.    Have discussed with attending nurse for coordination of care.    Patient is scheduled to have MARSHA cardiac catheterization today.    Follow-up labs ordered.     Further plan will depend on patient's progress.   ]]]]]]]]]]]]]]]]]]]]]]     Date of study  5/13/2022    Indications  Bacteremia  Assessment for bacterial endocarditis    Procedure  Anesthesia was provided by anesthesiologist with intravenous Diprivan.  MARSHA probe could be passed without difficulty.  Patient tolerated the procedure well.  No complications were noted.    Procedure performed  Transesophageal echocardiogram Doppler study (color continuous-wave and pulsed wave)    Results  Technically satisfactory study.  Mitral valve is structurally normal.  Tricuspid valve is normal.  Aortic valve is tricuspid and is normal.  Mild mitral aortic and tricuspid regurgitation is present.  Biatrial enlargement is present.  Left atrial appendage enlargement without clot.  Diffuse left ventricular enlargement and hypocontractility is present with ejection fraction of 25 to 30%.  No pericardial fusion or intracardiac thrombus is present.  Right atrium right ventricle enlargement is present.  Bubble study revealed PFO.  No pericardial effusion or intracardiac thrombus is seen.    Impression  Biatrial and biventricular enlargement.  Diffuse left ventricular hypocontractility with ejection fraction of 25 to 30%.  Mild smoking effect in the left atrium.  Small PFO.  Mild mitral aortic and tricuspid regurgitation is present.      Cardiac catheterization 5/13/2022 revealed  Left ventricle is  significantly enlarged with severe and diffuse hypocontractility with ejection fraction of 20%.    Left main coronary artery is normal.  Left anterior descending artery has proximal 90% disease.  Mid segment stents are patent.  Circumflex coronary artery is totally occluded.  Right coronary artery is totally occluded.    SVG to marginal branch is totally and chronically occluded.  SVG to RCA is patent.    Left subclavian artery is normal.  Left internal mammary artery is not a graft and is normal.    RECOMMENDATIONS:  Patient to have intervention to proximal left anterior descending artery.  Patient has significant left ventricle dysfunction.  Patient would benefit from ICD placement.  However patient has bacterial infection and will discuss with infectious diseases regarding timing of defibrillator placement          Jose G Rm MD  05/13/22  06:58 EDT

## 2022-05-13 NOTE — PROGRESS NOTES
[facility] Medicine Services Daily Progress Note    Patient Name: Benson Mclean  : 1950  MRN: [id number]  Primary Care Physician:  Rudolph Rooney MD  Date of admission: 2022    Previous notes and with minor updates.  Subjective        Chief Complaint: Shortness of breath fluid overload.      Brief history:    Patient is a 71 y.o. male with  past medical history of coronary artery disease status post CABG/cardiac stent placement, ESRD on HD Tbexrd-Sslfqeecy-Wzsjlp, chronic systolic congestive heart failure, chronic atrial fibrillation, CVA with residual right-sided weakness, COPD on chronic oxygen 3L O2 via NC, ANNETTE on Cpap, previous PE,  HTN, HLD, type 2 diabetes mellitus complicated by neuropathy, anemia of renal disease, vitamin deficiencies, and dementia who presented to [facility] ED 2022 from dialysis center. Apparently he was sent to the ED for shortness of breath before completing his dialysis treatment. He had one hour left per nursing report. He also apparently did not have dialysis on Wednesday. The patient complains of shortness of breath and cough, possibly a fever. No increased lower extremity edema. He stated he is bedbound. He is a poor historian and not willing to offer a great deal of information.   Patient was seen in the emergency room and in the ED the patient had CXR that showed small right greater than left pleural effusions.  Bibasilar airspace disease, right greater than left, favored to represent atelectasis.  proBNP greater then 70,000, troponin 0.33, creatinine 2.74, BUN 15, normal potassium, normal WBC, normal lactate, Hgb 11.0.  Blood cultures drawn.  COVID-negative.  Pt on his home rate of 3L O2 via nasal cannula. Nephrology was consulted with plans for hemodialysis.  He was admitted to the hospitalist group for further treatment of acute on chronic respiratory failure secondary to fluid overload, needs dialysis.       5/8/2022.  Patient is complaining that he cannot eat normal food because he is on a mechanical soft diet.  Speech therapy following    5/9/2022.  Patient was seen and examined.  Patient reports having a good night sleep.  Patient is on clear liquid diet now and n.p.o. after midnight.    5/10/2022.  Patient was seen and examined.  Infectious disease consult was completed because of strep bovis bacteremia.  Endoscopy is planned soon.  Eliquis is on hold.    5/12/2022  The patient overall was feeling somewhat better.  He is wanting of fig condon bar.  He otherwise had no requests and no complaints.     5/13/2022  Patient is going for cardioversion and MARSHA cath later today.    ROS negative except as above    Objective      Vitals:   Temp:  [97.4 °F (36.3 °C)-98.5 °F (36.9 °C)] 98.5 °F (36.9 °C)  Heart Rate:  [61-70] 70  Resp:  [11-20] 18  BP: ()/(46-67) 107/67  Arterial Line BP: (113-119)/(44-48) 113/46  Flow (L/min):  [3-15] 15    Physical Exam  Vitals reviewed.   Constitutional:   Patient appears chronically ill.     Comments: Chronically weak appearing   patient is lying comfortably in bed and in no acute distress.  HENT:      Head: Normocephalic.   Cardiovascular:      Rate and Rhythm: Normal rate.   Pulmonary:      Effort: Pulmonary effort is normal.   Abdominal:      General: Abdomen is flat.      Palpations: Abdomen is soft.  There is no tenderness to palpation no palpable organomegaly or masses.  Musculoskeletal:         General: Normal range of motion.      Cervical back: Normal range of motion.   Skin:     General: Skin is warm.   Neurological:      General: No focal deficit present.      Mental Status: He is alert and oriented to person, place, and time.   Psychiatric:         Mood and Affect: Mood normal.         Behavior: Behavior normal.        Result Review    Result Review:  I have personally reviewed the results from the time of this admission to 5/13/2022 15:36 EDT and agree with these  findings:  [x]  Laboratory  [x]  Microbiology  [x]  Radiology  []  EKG/Telemetry   []  Cardiology/Vascular   []  Pathology  []  Old records  []  Other:  Most notable findings include:     Radiology Results (last 21 days)    Procedure Component Value Units Date/Time   CT Chest Without Contrast Diagnostic [[id number]] Vinh as Reviewed   Order Status: Completed Collected: 05/07/22 1740    Updated: 05/07/22 1758   Narrative:     Examination: CT CHEST WO CONTRAST DIAGNOSTIC-       Date of Exam: 5/7/2022 5:14 PM       Indication: Possible pneumonia; N18.6-End stage renal disease;   Z99.2-Dependence on renal dialysis; R06.00-Dyspnea, unspecified;   Z91.15-Patient's noncompliance with renal dialysis; F32-Duytuhz   effusion, not elsewhere classified.       Comparison: Correlation with chest radiograph 05/06/2022.       Technique: Non-contrast axial volumetric CT imaging of the chest was   performed. Automated exposure control and iterative reconstruction   methods were used.       Findings:   There is moderate to large right and small-to-moderate left pleural   effusion. There is complete atelectasis of the right lower lobe and   atelectasis of approximately 60% of the left lower lobe. There is   partial atelectasis of both upper lobes and the right middle lobe.   Overall, aerated lung occupies approximately 40% of the thoracic cavity.   There is a 7 mm nodule in the subpleural anterior left upper lobe on   axial image 36. There is no pneumothorax. Central airways are patent.   There is a small amount of mucoid debris in the right posterior trachea.       Thyroid, remainder of the trachea and esophagus appear within normal   limits. There is severe native coronary artery calcification. The   patient appears status post median sternotomy and CABG. The heart is   enlarged. There is diminished attenuation of the blood pool suggesting   anemia. The main pulmonary artery is enlarged up to 42 mm suggesting   increased right heart  pressure. No pericardial effusion. No pathologic   mediastinal adenopathy.       There is skin thickening and edema in the midline upper back area there   is marked diffuse muscular atrophy. There is a small volume of   perihepatic ascites. There is a left kidney cyst at the inferior pole   measuring 28 mm. There is an exophytic simple right kidney cyst   measuring 24 mm. The patient is status post cholecystectomy. There is   mild bilateral gynecomastia. There is no acute osseous abnormality or   destructive bone lesion. The bones appear demineralized. There are mild   degenerative changes.       Impression:            1. Moderate to large right and small-to-moderate left pleural effusions   with significant passive atelectasis. Aerated lung occupies   approximately 40% of the anatomic lung space.   2. Enlarged main pulmonary artery suggesting increased right heart   pressure.   3. Coronary artery disease status post CABG.   5. Marked atrophy of the musculature diffusely.   6. Gynecomastia.       Electronically Signed By-Rigobreto Hill MD On:5/7/2022 5:45 PM   This report was finalized on [id number] by  Rigoberto Hill MD.          Wounds (last 24 hours)     LDA Wound     Row Name 05/13/22 0805 05/13/22 0400 05/13/22 0000       Wound 05/06/22 1706 Right heel    Wound - Properties Group Placement Date: 05/06/22 -JP Placement Time: 1706 -JP Present on Hospital Admission: Y  -MAGNO Side: Right  - Location: heel  -MAGNO    Pressure Injury Stage -- DTPI  -AS DTPI  -AS    Dressing Appearance -- open to air  -AS open to air  -AS    Closure Open to air  -JE Open to air  -AS Open to air  -AS    Drainage Amount none  -JE none  -AS none  -AS    Retired Wound - Properties Group Placement Date: 05/06/22  -MAGNO Placement Time: 1706 -JP Present on Hospital Admission: Y  -MAGNO Side: Right  - Location: heel  -    Retired Wound - Properties Group Date first assessed: 05/06/22 -JP Time first assessed: 1706 -JP Present on Hospital  Admission: Y  -MAGNO Side: Right  -MAGNO Location: heel  -MAGNO       Wound 03/30/22 0343 Left heel    Wound - Properties Group Placement Date: 03/30/22  -AS Placement Time: 0343 -AS Present on Hospital Admission: Y  -AS Side: Left  -AS Location: heel  -AS    Pressure Injury Stage -- DTPI  -AS DTPI  -AS    Dressing Appearance -- open to air  -AS open to air  -AS    Closure Open to air  -JE Open to air  -AS Open to air  -AS    Drainage Amount none  -JE none  -AS none  -AS    Retired Wound - Properties Group Placement Date: 03/30/22  -AS Placement Time: 0343  -AS Present on Hospital Admission: Y  -AS Side: Left  -AS Location: heel  -AS    Retired Wound - Properties Group Date first assessed: 03/30/22  -AS Time first assessed: 0343 -AS Present on Hospital Admission: Y  -AS Side: Left  -AS Location: heel  -AS       Wound 05/06/22 1710 Right anterior greater trochanter    Wound - Properties Group Placement Date: 05/06/22  -MAGNO Placement Time: 1710  -MAGNO Present on Hospital Admission: Y  -MAGNO Side: Right  -MAGNO Orientation: anterior  -MAGNO Location: greater trochanter  -MAGNO    Dressing Appearance -- open to air  -AS open to air  -AS    Closure Open to air  -JE Open to air  -AS Open to air  -AS    Base red  -JE red  -AS red  -AS    Drainage Amount none  -JE none  -AS none  -AS    Retired Wound - Properties Group Placement Date: 05/06/22  -MAGNO Placement Time: 1710 -MAGNO Present on Hospital Admission: Y  -MAGNO Side: Right  -MAGNO Orientation: anterior  -MAGNO Location: greater trochanter  -MAGNO    Retired Wound - Properties Group Date first assessed: 05/06/22  -MAGNO Time first assessed: 1710  -MAGNO Present on Hospital Admission: Y  -MAGNO Side: Right  -MAGNO Location: greater trochanter  -MAGNO       Wound 03/30/22 0341 Left posterior thigh    Wound - Properties Group Placement Date: 03/30/22  -AS Placement Time: 0341 -AS Present on Hospital Admission: Y  -AS Side: Left  -AS Orientation: posterior  -AS Location: thigh  -AS    Dressing Appearance -- open to air   -AS open to air  -AS    Closure Open to air  -JE Open to air  -AS Open to air  -AS    Base red;moist  -JE red;moist  -AS red;moist  -AS    Drainage Amount none  -JE none  -AS none  -AS    Retired Wound - Properties Group Placement Date: 03/30/22  -AS Placement Time: 0341  -AS Present on Hospital Admission: Y  -AS Side: Left  -AS Orientation: posterior  -AS Location: thigh  -AS    Retired Wound - Properties Group Date first assessed: 03/30/22  -AS Time first assessed: 0341  -AS Present on Hospital Admission: Y  -AS Side: Left  -AS Location: thigh  -AS       Wound 03/30/22 0340 perineum Pressure Injury    Wound - Properties Group Placement Date: 03/30/22  -AS Placement Time: 0340  -AS Present on Hospital Admission: Y  -AS Location: perineum  -AS Primary Wound Type: Pressure inj  -AS    Dressing Appearance -- open to air  -AS open to air  -AS    Closure Open to air  -JE Open to air  -AS Open to air  -AS    Base non-blanchable;red;moist  -JE non-blanchable;red;moist  -AS non-blanchable;red;moist  -AS    Periwound excoriated  -JE excoriated  -AS excoriated  -AS    Drainage Amount none  -JE none  -AS none  -AS    Retired Wound - Properties Group Placement Date: 03/30/22  -AS Placement Time: 0340  -AS Present on Hospital Admission: Y  -AS Location: perineum  -AS Primary Wound Type: Pressure inj  -AS    Retired Wound - Properties Group Date first assessed: 03/30/22  -AS Time first assessed: 0340  -AS Present on Hospital Admission: Y  -AS Location: perineum  -AS Primary Wound Type: Pressure inj  -AS       Wound 05/11/22 0230 Right posterior hand Skin Tear    Wound - Properties Group Placement Date: 05/11/22  - Placement Time: 0230  - Side: Right  - Orientation: posterior  - Location: hand  - Primary Wound Type: Skin tear  -    Dressing Appearance -- open to air  -AS open to air  -AS    Closure OZIEL  -JE OZIEL  -AS OZIEL  -AS    Base dressing in place, unable to visualize  -JE dressing in place, unable to visualize   -AS dressing in place, unable to visualize  -AS    Periwound ecchymotic  -JE ecchymotic  -AS ecchymotic  -AS    Retired Wound - Properties Group Placement Date: 05/11/22  - Placement Time: 0230  - Side: Right  - Orientation: posterior  - Location: hand  - Primary Wound Type: Skin tear  -    Retired Wound - Properties Group Date first assessed: 05/11/22  - Time first assessed: 0230  - Side: Right  - Location: hand  - Primary Wound Type: Skin tear  -    Row Name 05/12/22 2130 05/12/22 2000 05/12/22 1600       Wound 05/06/22 1706 Right heel    Wound - Properties Group Placement Date: 05/06/22  -MAGNO Placement Time: 1706 -MAGNO Present on Hospital Admission: Y  -MAGNO Side: Right  -MAGNO Location: heel  -MAGNO    Pressure Injury Stage DTPI  -AS -- --    Dressing Appearance open to air  -AS -- open to air  -MN    Closure Open to air  -AS Open to air  -GRIFFIN --    Drainage Amount none  -AS -- --    Care, Wound -- pressure removed  -GRIFFIN --    Retired Wound - Properties Group Placement Date: 05/06/22  -MAGNO Placement Time: 1706 -MAGNO Present on Hospital Admission: Y  -MAGNO Side: Right  -MAGNO Location: heel  -MAGNO    Retired Wound - Properties Group Date first assessed: 05/06/22  -MAGNO Time first assessed: 1706  -MAGNO Present on Hospital Admission: Y  -MAGNO Side: Right  -MAGNO Location: heel  -MAGNO       Wound 03/30/22 0343 Left heel    Wound - Properties Group Placement Date: 03/30/22  -AS Placement Time: 0343  -AS Present on Hospital Admission: Y  -AS Side: Left  -AS Location: heel  -AS    Pressure Injury Stage DTPI  -AS -- --    Dressing Appearance open to air  -AS -- open to air  -MN    Closure Open to air  -AS Open to air  -GRIFFIN --    Drainage Amount none  -AS -- --    Care, Wound -- pressure removed  -GRIFFIN --    Retired Wound - Properties Group Placement Date: 03/30/22  -AS Placement Time: 0343  -AS Present on Hospital Admission: Y  -AS Side: Left  -AS Location: heel  -AS    Retired Wound - Properties Group Date first assessed: 03/30/22   -AS Time first assessed: 0343  -AS Present on Hospital Admission: Y  -AS Side: Left  -AS Location: heel  -AS       Wound 05/06/22 1710 Right anterior greater trochanter    Wound - Properties Group Placement Date: 05/06/22  -MAGNO Placement Time: 1710 -MAGNO Present on Hospital Admission: Y  -MAGNO Side: Right  -MAGNO Orientation: anterior  -MAGNO Location: greater trochanter  -MAGNO    Dressing Appearance open to air  -AS -- open to air  -MN    Closure Open to air  -AS Open to air  -GRIFFIN --    Base red  -AS red  -GRIFFIN --    Drainage Amount none  -AS -- --    Care, Wound -- barrier applied  -GRIFFIN --    Dressing Care -- skin barrier agent applied  -GRIFFIN --    Retired Wound - Properties Group Placement Date: 05/06/22  -MAGNO Placement Time: 1710  -MAGNO Present on Hospital Admission: Y  -MAGNO Side: Right  -MAGNO Orientation: anterior  -MAGNO Location: greater trochanter  -MAGNO    Retired Wound - Properties Group Date first assessed: 05/06/22  -MAGNO Time first assessed: 1710  -MAGNO Present on Hospital Admission: Y  -MAGNO Side: Right  -MAGNO Location: greater trochanter  -MAGNO       Wound 03/30/22 0341 Left posterior thigh    Wound - Properties Group Placement Date: 03/30/22  -AS Placement Time: 0341  -AS Present on Hospital Admission: Y  -AS Side: Left  -AS Orientation: posterior  -AS Location: thigh  -AS    Dressing Appearance open to air  -AS -- open to air  -MN    Closure Open to air  -AS -- --    Base red;moist  -AS -- --    Drainage Amount none  -AS -- --    Retired Wound - Properties Group Placement Date: 03/30/22  -AS Placement Time: 0341  -AS Present on Hospital Admission: Y  -AS Side: Left  -AS Orientation: posterior  -AS Location: thigh  -AS    Retired Wound - Properties Group Date first assessed: 03/30/22  -AS Time first assessed: 0341  -AS Present on Hospital Admission: Y  -AS Side: Left  -AS Location: thigh  -AS       Wound 03/30/22 0340 perineum Pressure Injury    Wound - Properties Group Placement Date: 03/30/22  -AS Placement Time: 0340  -AS Present on  Hospital Admission: Y  -AS Location: perineum  -AS Primary Wound Type: Pressure inj  -AS    Dressing Appearance open to air  -AS -- open to air  -MN    Closure Open to air  -AS -- --    Base non-blanchable;red;moist  -AS red;moist  -GRIFFIN --    Periwound excoriated  -AS excoriated  -GRIFFIN --    Drainage Amount none  -AS -- --    Care, Wound -- barrier applied  -GRIFFIN --    Retired Wound - Properties Group Placement Date: 03/30/22  -AS Placement Time: 0340  -AS Present on Hospital Admission: Y  -AS Location: perineum  -AS Primary Wound Type: Pressure inj  -AS    Retired Wound - Properties Group Date first assessed: 03/30/22  -AS Time first assessed: 0340  -AS Present on Hospital Admission: Y  -AS Location: perineum  -AS Primary Wound Type: Pressure inj  -AS       Wound 05/11/22 0230 Right posterior hand Skin Tear    Wound - Properties Group Placement Date: 05/11/22  - Placement Time: 0230  - Side: Right  - Orientation: posterior  - Location: hand  - Primary Wound Type: Skin tear  -    Dressing Appearance -- -- open to air  -MN    Base dressing in place, unable to visualize  -AS dressing in place, unable to visualize  -GRIFFIN --    Periwound ecchymotic  -AS -- --    Retired Wound - Properties Group Placement Date: 05/11/22  - Placement Time: 0230  - Side: Right  - Orientation: posterior  - Location: hand  - Primary Wound Type: Skin tear  -    Retired Wound - Properties Group Date first assessed: 05/11/22  - Time first assessed: 0230  - Side: Right  - Location: hand  - Primary Wound Type: Skin tear  -          User Key  (r) = Recorded By, (t) = Taken By, (c) = Cosigned By    Initials Name Provider Type    Jessica Isaac RN Registered Nurse    Zainab Griffiths RN Registered Nurse    Umm Espinal RN Registered Nurse    Camilla Christiansen RN Registered Nurse    Maricruz Jiménez RN Registered Nurse    Radha Chaudhry RN Registered Nurse                     Assessment/Plan     71-year-old gentleman with fluid overload and bacteremia     Active Hospital Problems:          Active Hospital Problems     Diagnosis     • **Fluid overload     • Chronic respiratory failure with hypoxia, on home oxygen therapy ([facility])     • Elevated troponin     • Hypothyroidism (acquired)     • End stage renal disease (HCC)     • Anemia of renal disease     • Mixed hyperlipidemia     • Major depressive disorder, recurrent, mild ([facility])     • Flaccid hemiplegia of right dominant side as late effect of cerebral infarction ([facility])     • COPD with acute exacerbation ([facility])     • S/P CABG (coronary artery bypass graft)     • Essential hypertension     • ANNETTE treated with BiPAP        Plan:      Shortness of breath secondary to fluid overload  Chronic respiratory failure with oxygen dependence    -Improving.  -CXR: small right greater than left pleural effusions.  Bibasilar airspace disease, right greater than left, favored to represent atelectasis.   -proBNP >70,000 (previous proBNP march 2022 was also >70,000)       -will recheck in the a.m.  -pt on his home rate of 3L O2 via NC  -pt did not complete dialysis  -nephrology consulted appreciate assistance     Possible strep bovis bacteremia.  ID is following.  IV antibiotics ordered  Pending culture of dialysis catheter  Pending final identification  Echo pending  Appreciate ID assistance    Chronically elevated troponin  -troponin 0.33 compared to troponin 0.146 on 3/12/2022.   -Will trend      ESRD on HD MWF  -on midodrine for BP support  -nephrology to manage     CVA with right sided residual weakness  -on eliquis, statin     CAD s/p CABG, PCI  Chronic atrial fibrillation  Hyperlipidemia  -chronic, controlled.   -continue Eliquis, atorvastatin, and metoprolol  -For MARSHA cardiac cath for ablation treatment today    DM type 2  -hemoglobin A1c 6.3 on 3/12/2022 and previous admission basal insulin stopped due to hypoglycemia  -SSI AC/HS     Hypothyroidism  -Continue  home synthroid     Major depressive disorder, recurrent, mild   Dementia  -Continue home zoloft, aricept     ANNETTE  -cpap qhs      Obesity (BMI 30-39.9)  BMI 36.92  -Lifestyle modifications to reduce cardiovascular risk factors     Dysphagia  Patient complaining about his current diet.  Speech following.     DVT prophylaxis: eliquis      CODE STATUS:    Admission Status:  I believe this patient meets observation status.     I discussed the patient's findings and my recommendations with patient.     This patient has been examined wearing appropriate Personal Protective Equipment.    05/13/22      Electronically signed by Kasandra Ocampo MD, FACP, 05/13/22, 15:36 EDT.      [facility] Hospitalist Team

## 2022-05-13 NOTE — ANESTHESIA PREPROCEDURE EVALUATION
Anesthesia Evaluation     Patient summary reviewed and Nursing notes reviewed   no history of anesthetic complications:  NPO Solid Status: > 8 hours  NPO Liquid Status: > 8 hours           Airway   Dental      Pulmonary    (+) a smoker Former, COPD, home oxygen, shortness of breath, sleep apnea,   Cardiovascular     ECG reviewed  PT is on anticoagulation therapy  Patient on routine beta blocker    (+) hypertension, CAD, CABG, cardiac stents dysrhythmias Atrial Fib, CHF Systolic <55%, hyperlipidemia,       Neuro/Psych  (+) CVA, numbness, psychiatric history Depression, dementia,    GI/Hepatic/Renal/Endo    (+) obesity,  GERD,  renal disease ESRD, dialysis and CRI, diabetes mellitus, thyroid problem hypothyroidism    Musculoskeletal     (+) back pain, chronic pain,   Abdominal    Substance History      OB/GYN          Other   arthritis, blood dyscrasia anemia,     ROS/Med Hx Other: Recent cardiopulmonary arrest, V fib, VT    BiPAP for ANNETTE, chronic respiratory failure with hypoxemia    LBBB, ascites, COPD exacerbation, low protein/albumin, MJ abuse, hyperglycemia, neuropathy, hypocalcemia, elevated troponin, elevated BNP, mediastinal lymphadnopathy, hemiplegia, spine fx, venous hypertension, fluid overload, bacteremia, brain bleed, burn, low vit B12 and D, foot ulcer, low iron, cytokine release syndrome, hypogonadism    H/O COVID-19    Echo  Mild right ventricular enlargement  Moderate left atrial enlargement  Aortic valve not well visualized in short axis.  Dopplers do not reveal any abnormality but  Mitral valve, tricuspid valve appears structurally normal, while mitral and tricuspid regurgitation seen.  Calculated RV systolic pressure of 30 mmHg  No pericardial effusion seen.  Proximal aorta appears normal in size.    Echocardiogram Findings    Left Ventricle Left ventricular ejection fraction appears to be 26 - 30%. Left ventricular systolic function is severely decreased.   Normal left ventricular cavity size and  wall thickness noted.  Right Ventricle The right ventricular cavity is mild to moderately dilated. Abnormal septal motion. Diastolic flattening of interventricular septum consistent with right ventricle volume overload.  Left Atrium The left atrial cavity is severely dilated.  Right Atrium Right atrium not well visualized.  Aortic Valve The aortic valve is not well visualized. Trace aortic valve regurgitation is present. No aortic valve stenosis is present.  Mitral Valve The mitral valve is grossly normal in structure. Trace mitral valve regurgitation is present.  Tricuspid Valve Trace to mild tricuspid valve regurgitation is present. Estimated right ventricular systolic pressure from tricuspid regurgitation is moderately elevated (45-55 mmHg). Calculated right ventricular systolic pressure from tricuspid regurgitation is 50 mmHg.  Pulmonic Valve The pulmonic valve is not well visualized.  Greater Vessels No dilation of the aortic root is present. The pulmonary artery not well visualized.  Pericardium There is no evidence of pericardial effusion. . There is a moderate sized left pleural effusion. There is evidence of ascites present.    Stress  Lexiscan Cardiolite test is abnormal with inferior and posterior lateral ischemia and infarction.     Gated SPECT images revealed normal left ventricular size and diffuse hypocontractility with ejection fraction of 33%.      Cath  1. HEMODYNAMICS: Left ventricle angiogram was not performed due to pre-existing renal dysfunction.     2. LEFT VENTRICULOGRAPHY:  Left ventricle angiogram was not performed due to pre-existing renal dysfunction.     3. CORONARY ANGIOGRAPHY:  Left main coronary artery normal.  Left anterior descending artery has mid segment lengthy 90% disease within the previously placed stent.  Distal left into descending artery has diffuse disease.  Circumflex coronary artery is totally occluded.  (Chronic)  Right coronary artery is chronically occluded.     SVG  to marginal branch (jump graft to OM1 and OM 2) is totally occluded (new finding compared to 2015.  SVG to PDA has distal radiolucency.  However no definite obstructive disease is present.     SUMMARY:  Left ventricle angiogram was not performed due to pre-existing renal dysfunction.  Left main coronary artery normal.  Left anterior descending artery has mid segment lengthy 90% disease within the previously placed stent.  Distal left into descending artery has diffuse disease.  Circumflex coronary artery is totally occluded.  (Chronic)  Right coronary artery is chronically occluded.     SVG to marginal branch (jump graft to OM1 and OM 2) is totally occluded (new finding compared to 2015.  SVG to PDA has distal radiolucency.  However no definite obstructive disease is present.     RECOMMENDATIONS:  Patient to have intervention to left anterior descending artery.  Spontaneous prolonged episode of asystole is of concern.  Patient had recovered without any intervention or CPR.  We will closely observe for any bradycardia arrhythmias and patient may need pacemaker/ICD implantation.    Impression: Successful stent implantation of a drug-eluting stent in the proximal to mid LAD without any complications.     There is 80 to 90% disease in the proximal to mid LAD with DENIZ-3 flow before PCI and 0% with DENIZ-3 flow after PCI      PSH  CORONARY ARTERY BYPASS GRAFT ANGIOPLASTY  CORONARY ANGIOPLASTY COLON RESECTION  APPENDECTOMY CHOLECYSTECTOMY  CATARACT EXTRACTION, BILATERAL TOE AMPUTATION  CARDIAC CATHETERIZATION CORONARY ANGIOPLASTY WITH STENT PLACEMENT  CYST REMOVAL CORONARY ANGIOPLASTY WITH STENT PLACEMENT  CARPAL TUNNEL RELEASE FOOT SURGERY  FOOT SURGERY CARDIAC CATHETERIZATION  CARDIAC CATHETERIZATION CARDIAC CATHETERIZATION  CARDIAC CATHETERIZATION CARDIAC ELECTROPHYSIOLOGY PROCEDURE  CARDIAC CATHETERIZATION CARDIAC CATHETERIZATION  CARDIAC ELECTROPHYSIOLOGY PROCEDURE PORTACATH PLACEMENT  ARTERIOVENOUS FISTULA/SHUNT  SURGERY COLONOSCOPY                Anesthesia Plan    ASA 4     MAC   (Patient identified; pre-operative vital signs, all relevant labs/studies, complete medical/surgical/anesthetic history, full medication list, full allergy list, and NPO status obtained/reviewed; physical assessment performed; anesthetic options, side effects, potential complications, risks, and benefits discussed; questions answered; written anesthesia consent obtained; patient cleared for procedure; anesthesia machine and equipment checked and functioning)    Anesthetic plan, all risks, benefits, and alternatives have been provided, discussed and informed consent has been obtained with: patient.        CODE STATUS:    Code Status (Patient has no pulse and is not breathing): CPR (Attempt to Resuscitate)  Medical Interventions (Patient has pulse or is breathing): Full Support

## 2022-05-13 NOTE — PLAN OF CARE
Goal Outcome Evaluation:              Outcome Evaluation: patient Had MARSHA, and Heart cath with PCI, getting ready to pulle the sheath. then will call and notify dialysis so he can get a treatment.

## 2022-05-13 NOTE — PROGRESS NOTES
Attempted to see patient twice but he was down getting a MARSHA and a heart catheterization.  We will continue the same antimicrobial therapy and attempt to see the patient again tomorrow

## 2022-05-13 NOTE — ANESTHESIA POSTPROCEDURE EVALUATION
Patient: Benson Mclean    Procedure Summary     Date: 05/13/22 Room / Location: [facility]    Anesthesia Start: 1253 Anesthesia Stop: 1306    Procedure: ADULT TRANSESOPHAGEAL ECHO (MARSHA) W/ CONT IF NECESSARY PER PROTOCOL Diagnosis: (Endocarditis)    Scheduled Providers: Jose G Rm MD Provider: Benson Park MD    Anesthesia Type: MAC ASA Status: 4          Anesthesia Type: MAC    Vitals  Vitals Value Taken Time   /51 05/13/22 1316   Temp     Pulse 70 05/13/22 1316   Resp     SpO2 100 % 05/13/22 1316           Post Anesthesia Care and Evaluation    Patient location during evaluation: bedside  Patient participation: complete - patient participated  Level of consciousness: awake  Pain scale: See nurse's notes for pain score.  Pain management: adequate  Airway patency: patent  Anesthetic complications: No anesthetic complications  PONV Status: none  Cardiovascular status: acceptable  Respiratory status: acceptable and spontaneous ventilation  Hydration status: acceptable    Comments: Patient seen and examined postoperatively; vital signs stable; SpO2 greater than or equal to 90%; cardiopulmonary status stable; nausea/vomiting adequately controlled; pain adequately controlled; no apparent anesthesia complications; patient discharged from anesthesia care when discharge criteria were met

## 2022-05-13 NOTE — SIGNIFICANT NOTE
05/13/22 1436   OTHER   Discipline physical therapist   Rehab Time/Intention   Session Not Performed patient unavailable for evaluation  (having cardiac cath)   Recommendation   PT - Next Appointment 05/14/22

## 2022-05-13 NOTE — PROGRESS NOTES
Daily Progress Note        Chronic systolic (congestive) heart failure (formerly [facility])    S/P CABG (coronary artery bypass graft)    Primary hypertension    Elevated troponin    ANNETTE treated with BiPAP    Major depressive disorder, recurrent, mild (HCC)    Mixed hyperlipidemia    Flaccid hemiplegia affecting right dominant side (formerly [facility])    Diabetic neuropathy (formerly [facility])    Chronic obstructive pulmonary disease with (acute) exacerbation (HCC)    Anemia of renal disease    End stage renal disease (HCC)    Chronic respiratory failure with hypoxia, on home oxygen therapy (formerly [facility])    Other specified hypothyroidism    Fluid overload    Stage 5 chronic kidney disease on chronic dialysis (formerly [facility])    Bacteremia    Normocytic anemia due to blood loss    Essential (primary) hypertension    Cardiopulmonary arrest with successful resuscitation (formerly [facility])    Ventricular fibrillation (formerly [facility])              Assessment:     S/p Code blue was in V-fib. was defibrillated x1.  Patient was given an epi x1, bicarb x2. Upon pulse checks patient was found to be with a pulse and in normal sinus with a left bundle. Patient was moaning and turning his head. He became more and more alert.  Patient did not require intubation.       He continued to oxygenate well with nasal cannula. 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,   anemia of renal disease,   vitamin deficiencies,   dementia         Recommendations:     Heart cath today  Amiodarone PO    MARSHA to rule out endocarditis due to Streptococcus  gallolyticus bacteremia  Colonoscopy completed, may need CT abdomen    Antibiotics currently on Rocephin    Hemodialysis     BiPAP 12/5 to be used at night and as needed during the day               LOS: 7 days         Vital signs for last 24 hours:  Vitals:    05/13/22 0144 05/13/22 0514 05/13/22 0638 05/13/22 0641   BP: 100/64 110/59     BP Location: Right arm Right arm     Patient Position: Lying Lying     Pulse: 66 64 63 62   Resp: 20 16 16 16   Temp: 97.4 °F (36.3 °C) 98.2 °F (36.8 °C)     TempSrc: Oral Oral     SpO2: 100% 100% 100% 100%   Weight:  101 kg (223 lb 1.7 oz)     Height:           Intake/Output last 3 shifts:  I/O last 3 completed shifts:  In: 1614 [P.O.:880; I.V.:684; IV Piggyback:50]  Out: 1500 [Other:1500]  Intake/Output this shift:  I/O this shift:  In: 240 [P.O.:240]  Out: 0     Vent settings for last 24 hours:       Hemodynamic parameters for last 24 hours:       Radiology  Imaging Results (Last 24 Hours)     ** No results found for the last 24 hours. **          Labs:  Results from last 7 days   Lab Units 05/13/22  0408   WBC 10*3/mm3 6.70   HEMOGLOBIN g/dL 10.4*   HEMATOCRIT % 34.3*   PLATELETS 10*3/mm3 211     Results from last 7 days   Lab Units 05/13/22  0408   SODIUM mmol/L 133*   POTASSIUM mmol/L 4.4   CHLORIDE mmol/L 98   CO2 mmol/L 25.0   BUN mg/dL 16   CREATININE mg/dL 2.90*   CALCIUM mg/dL 8.2*   BILIRUBIN mg/dL 0.3   ALK PHOS U/L 49   ALT (SGPT) U/L 6   AST (SGOT) U/L 11   GLUCOSE mg/dL 97     Results from last 7 days   Lab Units 05/11/22  0253   PH, ARTERIAL pH units 7.362   PO2 ART mm Hg 100.1   PCO2, ARTERIAL mm Hg 47.0   HCO3 ART mmol/L 26.7     Results from last 7 days   Lab Units 05/13/22  0408 05/12/22  0501 05/11/22  0555   ALBUMIN g/dL 2.90* 3.00* 2.80*     Results from last 7 days   Lab Units 05/13/22  0408 05/06/22  1828 05/06/22  1045   TROPONIN T ng/mL 0.583* 0.311* 0.330*         Results from last 7 days   Lab Units 05/13/22  0408   MAGNESIUM mg/dL 2.3     Results  from last 7 days   Lab Units 05/13/22  0408 05/10/22  0706   INR  1.12* 1.09               Meds:   SCHEDULE  amiodarone, 200 mg, Oral, Q24H  atorvastatin, 40 mg, Oral, Nightly  budesonide, 0.5 mg, Nebulization, BID  cefTRIAXone, 2 g, Intravenous, Q24H  donepezil, 10 mg, Oral, Nightly  enoxaparin, 1 mg/kg, Subcutaneous, Q24H  insulin lispro, 0-7 Units, Subcutaneous, TID AC  ipratropium-albuterol, 3 mL, Nebulization, TID - RT  levothyroxine, 50 mcg, Oral, Q AM  lidocaine, 20 mL, Intradermal, Once  metoprolol tartrate, 25 mg, Oral, BID  midodrine, 15 mg, Oral, TID AC  pantoprazole, 40 mg, Oral, QAM  polyethylene glycol, 17 g, Oral, BID  sertraline, 50 mg, Oral, Daily  sodium chloride, 10 mL, Intravenous, Q12H  sodium chloride, 10 mL, Intravenous, Q12H  sorbitol, 50 mL, Oral, Daily      Infusions     PRNs  •  acetaminophen **OR** acetaminophen **OR** acetaminophen  •  aluminum-magnesium hydroxide-simethicone  •  dextrose  •  dextrose  •  glucagon (human recombinant)  •  insulin lispro **AND** insulin lispro  •  ipratropium-albuterol  •  melatonin  •  Morphine  •  ondansetron **OR** ondansetron  •  oxyCODONE  •  sodium chloride  •  sodium chloride  •  sodium chloride    Physical Exam:  Physical Exam  Cardiovascular:      Heart sounds: Murmur heard.   Pulmonary:      Breath sounds: Rhonchi and rales present.         ROS  Review of Systems   Respiratory: Positive for cough and shortness of breath.          I have reviewed current clinicals.     Electronically signed by FABIOLA Brady, 05/13/22, 6:49 AM EDT.     The NP scribed the note for me, I have examined the patient and reviewed and verified the above findings and and I formulated the assessment and plan as documented

## 2022-05-13 NOTE — PROGRESS NOTES
RENAL/KCC:    Name: Benson Mclean  Age: 71 y.o.  : 1950  Sex: male    22    Subjective  For MARSHA and HD today.       Objective:    Vital Signs  Temp:  [97.4 °F (36.3 °C)-98.5 °F (36.9 °C)] 98.5 °F (36.9 °C)  Heart Rate:  [61-70] 70  Resp:  [11-20] 18  BP: ()/(46-64) 110/51  Arterial Line BP: (113-119)/(44-48) 113/46        Intake/Output Summary (Last 24 hours) at 2022 1326  Last data filed at 2022 1305  Gross per 24 hour   Intake 540 ml   Output 0 ml   Net 540 ml           Physical Exam  Physical Exam  Constitutional:       General: He is not in acute distress.     Appearance: He is not diaphoretic.   HENT:      Head: Normocephalic and atraumatic.      Nose: Nose normal.   Eyes:      Pupils: Pupils are equal, round, and reactive to light.   Neck:      Thyroid: No thyromegaly.      Vascular: No JVD.      Trachea: No tracheal deviation.   Cardiovascular:      Rate and Rhythm: Normal rate and regular rhythm.      Heart sounds: No murmur heard.    No friction rub. No gallop.   Pulmonary:      Effort: Pulmonary effort is normal. No respiratory distress.      Breath sounds: No stridor. No wheezing or rales.   Chest:      Chest wall: No tenderness.   Abdominal:      General: Bowel sounds are normal.      Palpations: Abdomen is soft. There is no mass.      Tenderness: There is no abdominal tenderness. There is no guarding or rebound.      Hernia: No hernia is present.   Musculoskeletal:         General: No tenderness or deformity. Normal range of motion.      Cervical back: Normal range of motion and neck supple.   Skin:     General: Skin is warm and dry.      Coloration: Skin is not pale.      Findings: No rash.   Neurological:      Mental Status: He is alert and oriented to person, place, and time.      Cranial Nerves: No cranial nerve deficit.      Motor: No abnormal muscle tone.      Coordination: Coordination normal.      Deep Tendon Reflexes: Reflexes are normal and symmetric. Reflexes  normal.   Psychiatric:         Behavior: Behavior normal.         Thought Content: Thought content normal.         Judgment: Judgment normal.            Results Review:      Results from last 7 days   Lab Units 05/13/22  0408 05/12/22  0501 05/11/22  0555 05/10/22  0706 05/09/22  0228 05/08/22  0446   SODIUM mmol/L 133* 133* 133* 135* 130* 133*   CO2 mmol/L 25.0 23.0 24.0 24.0 24.0 25.0   BUN mg/dL 16 12 21 17 24* 19   CREATININE mg/dL 2.90* 2.48* 3.41* 2.86* 3.39* 2.94*   CALCIUM mg/dL 8.2* 8.1* 8.0* 8.7 7.9* 8.1*   ALBUMIN g/dL 2.90* 3.00* 2.80* 2.90*  --  2.60*   AST (SGOT) U/L 11 12 10 12  --  12   ALT (SGPT) U/L 6 7 8 6  --  5   EGFR mL/min/1.73 22.4* 27.1* 18.5* 22.8* 18.6* 22.1*       Results from last 7 days   Lab Units 05/13/22  0408 05/12/22  0501 05/11/22  0555 05/10/22  0706 05/09/22  0528 05/08/22  0446 05/07/22  0417   WBC 10*3/mm3 6.70 7.20 11.20* 6.70 6.90 6.10 6.70   INR  1.12*  --   --  1.09  --   --   --          Medication Review:   amiodarone, 200 mg, Oral, Q24H  [MAR Hold] atorvastatin, 40 mg, Oral, Nightly  [MAR Hold] budesonide, 0.5 mg, Nebulization, BID  cefTRIAXone, 2 g, Intravenous, Q24H  [MAR Hold] donepezil, 10 mg, Oral, Nightly  [MAR Hold] insulin lispro, 0-7 Units, Subcutaneous, TID AC  [MAR Hold] ipratropium-albuterol, 3 mL, Nebulization, TID - RT  [MAR Hold] levothyroxine, 50 mcg, Oral, Q AM  [MAR Hold] lidocaine, 20 mL, Intradermal, Once  metoprolol tartrate, 25 mg, Oral, BID  [MAR Hold] midodrine, 15 mg, Oral, TID AC  [MAR Hold] pantoprazole, 40 mg, Oral, QAM  [MAR Hold] polyethylene glycol, 17 g, Oral, BID  [MAR Hold] sertraline, 50 mg, Oral, Daily  [MAR Hold] sodium chloride, 10 mL, Intravenous, Q12H  [MAR Hold] sodium chloride, 10 mL, Intravenous, Q12H  sodium chloride, 3 mL, Intravenous, Q12H  [MAR Hold] sorbitol, 50 mL, Oral, Daily          Assessment:    End-stage renal disease    Bacteremia    S/P CODE    A-fib - on Amio    SOA    CHF    History of CVA    History of coronary  artery disease    Diabetes      Plan:  HD MWF as tolerated  BP support  ABX per ID - for MARSHA today  Will follow      Yony Bhat MD  05/13/22  13:26 EDT  Tel: [phone]  Fax: [phone]

## 2022-05-14 NOTE — PLAN OF CARE
Problem: Adult Inpatient Plan of Care  Goal: Plan of Care Review  Outcome: Ongoing, Progressing  Flowsheets (Taken 5/14/2022 1601)  Outcome Evaluation: Pt cont 2l nc.  Pt had dialysis early morning today with 1L removed.  PT working with pt today.  Brother at bedside today and aware of pt status.  Will cont to monitor.   Goal Outcome Evaluation:              Outcome Evaluation: Pt cont 2l nc.  Pt had dialysis early morning today with 1L removed.  PT working with pt today.  Brother at bedside today and aware of pt status.  Will cont to monitor.

## 2022-05-14 NOTE — PLAN OF CARE
Goal Outcome Evaluation:    Patient has been resting in bed this shift and is currently receiving dialysis.  Patient has had no complaints of pain.

## 2022-05-14 NOTE — PROGRESS NOTES
RENAL/[facility]:    Name: Benson Mclean  Age: 71 y.o.  : 1950  Sex: male    22    Chief complaint/reason for evaluation: ESRD, dialysis management      Subjective  No new complaints, underwent MARSHA yesterday and heart cath, status post PCI to the LAD  MARSHA, EF 25 to 30%, no vegetations were mentioned  Received dialysis late last night, tolerated well  Denies any shortness of breath or chest pain  Minimal edema, no nausea or vomiting       Objective:    Vital Signs  Temp:  [96.6 °F (35.9 °C)-98.6 °F (37 °C)] 97.2 °F (36.2 °C)  Heart Rate:  [58-71] 63  Resp:  [18-20] 20  BP: ()/(36-69) 105/49        Intake/Output Summary (Last 24 hours) at 2022 1309  Last data filed at 2022 0814  Gross per 24 hour   Intake 100 ml   Output 1000 ml   Net -900 ml           Physical Exam  Physical Exam  Constitutional:       General: He is not in acute distress.     Appearance: He is not diaphoretic.   HENT:      Head: Normocephalic and atraumatic.      Nose: Nose normal.   Eyes:      Pupils: Pupils are equal, round, and reactive to light.   Neck:      Thyroid: No thyromegaly.      Vascular: No JVD.      Trachea: No tracheal deviation.   Cardiovascular:      Rate and Rhythm: Normal rate and regular rhythm.      Heart sounds: No murmur heard.    No friction rub. No gallop.   Pulmonary:      Effort: Pulmonary effort is normal. No respiratory distress.      Breath sounds: No stridor. No wheezing or rales.   Chest:      Chest wall: No tenderness.   Abdominal:      General: Bowel sounds are normal.      Palpations: Abdomen is soft. There is no mass.      Tenderness: There is no abdominal tenderness. There is no guarding or rebound.      Hernia: No hernia is present.   Musculoskeletal:         General: No tenderness or deformity. Normal range of motion.      Cervical back: Normal range of motion and neck supple.   Skin:     General: Skin is warm and dry.      Coloration: Skin is not pale.      Findings: No rash.    Neurological:      Mental Status: He is alert and oriented to person, place, and time.      Cranial Nerves: No cranial nerve deficit.      Motor: No abnormal muscle tone.      Coordination: Coordination normal.      Deep Tendon Reflexes: Reflexes are normal and symmetric. Reflexes normal.   Psychiatric:         Behavior: Behavior normal.         Thought Content: Thought content normal.         Judgment: Judgment normal.            Results Review:      Results from last 7 days   Lab Units 05/14/22  0837 05/13/22  0408 05/12/22  0501 05/11/22  0555 05/10/22  0706   SODIUM mmol/L 136 133* 133* 133* 135*   CO2 mmol/L 25.0 25.0 23.0 24.0 24.0   BUN mg/dL 10 16 12 21 17   CREATININE mg/dL 2.09* 2.90* 2.48* 3.41* 2.86*   CALCIUM mg/dL 8.6 8.2* 8.1* 8.0* 8.7   ALBUMIN g/dL 3.10* 2.90* 3.00* 2.80* 2.90*   AST (SGOT) U/L 20 11 12 10 12   ALT (SGPT) U/L 7 6 7 8 6   EGFR mL/min/1.73 33.2* 22.4* 27.1* 18.5* 22.8*       Results from last 7 days   Lab Units 05/14/22  0837 05/13/22  0408 05/12/22  0501 05/11/22  0555 05/10/22  0706 05/09/22  0528 05/08/22  0446   WBC 10*3/mm3 4.80 6.70 7.20 11.20* 6.70 6.90 6.10   INR   --  1.12*  --   --  1.09  --   --          Medication Review:   amiodarone, 200 mg, Oral, Q24H  aspirin, 81 mg, Oral, Daily  atorvastatin, 40 mg, Oral, Nightly  budesonide, 0.5 mg, Nebulization, BID  cefTRIAXone, 2 g, Intravenous, Q24H  clopidogrel, 75 mg, Oral, Daily  donepezil, 10 mg, Oral, Nightly  insulin lispro, 0-7 Units, Subcutaneous, TID AC  ipratropium-albuterol, 3 mL, Nebulization, TID - RT  levothyroxine, 50 mcg, Oral, Q AM  lidocaine, 20 mL, Intradermal, Once  metoprolol tartrate, 25 mg, Oral, BID  midodrine, 15 mg, Oral, TID AC  pantoprazole, 40 mg, Oral, QAM  polyethylene glycol, 17 g, Oral, BID  sertraline, 50 mg, Oral, Daily  sodium chloride, 10 mL, Intravenous, Q12H  sodium chloride, 10 mL, Intravenous, Q12H  sorbitol, 50 mL, Oral, Daily      Data:  Heart catheterization and MARSHA results  noted      Assessment:    End-stage renal disease, HD MWF, completed dialysis earlier this morning after heart cath    Bacteremia, no vegetations on MARSHA.  It appears per ID notes that the plan is to keep his CVC in for now and treat with IV antibiotics, will likely need catheter exchange if the bacteremia recurs after antibiotics.  On IV Rocephin    S/P CODE, status post heart cath, PTCA    CAD status post PTCA yesterday    Systolic CHF, EF 30%    Atrial fibrillation, on oral amiodarone    Chronic hypotension on midodrine    Prior CVA    Diabetes mellitus type II      Plan:  He completed dialysis early this a.m. after heart cath  Continue Monday Wednesday Friday HD schedule  Midodrine for BP support  Hemoglobin above threshold for TAMERA, will monitor  ABX per ID, no vegetations on MARSHA.  Plan is to keep CVC in place for now  Monitor electrolytes and volume closely  Discharge planning      Crow Vences MD  05/14/22  13:09 EDT  Tel: [phone]  Fax: [phone]

## 2022-05-14 NOTE — PLAN OF CARE
Goal Outcome Evaluation:     Outcome Evaluation  Pt is a 70 y/o male who presents to [facility] d/t noncompliance with renal dialysis, B pleural effusions, fluid overload, dyspnea, and stage 5 chronic kidney disease on HD. Pt has a h/o CGA w/ R sided deficits and dementia. Pt is from [facility] facility and reports he is mostly bed bound. He reports being able to  November of last year prior to his CVA. At this time pt requires max A for bed mobility. BLE strength grossly 3/5. Pt likely able to maintain static sitting at EOB with Ax2. Recommend pt return to SNF to address functional deficits.

## 2022-05-14 NOTE — NURSING NOTE
Per report from dayshift nurse and patient orders, this patient was supposed to get dialysis today but it had to be delayed due to heart cath.  Nobody from dialysis had called to sent patient over, so RN called inpatient dialysis here.  The nurse in dialysis told RN she did not have time tonight to get him in.  On call Chel[facility] RN was called and said this patient was not on the list and the only thing they could do was to add the patient to todays list.     0115: On call nurse called back and did not realize patient had heart cath with dye yesterday.  Dialysis RN reported that someone will be here to do dialysis soon.

## 2022-05-14 NOTE — NURSING NOTE
Hemodialysis completed,vss,UF-1L.Post vs-BP-102/53 mmhg,HR-71,R-18,Spo2-100%.Report given to staff.

## 2022-05-14 NOTE — PROGRESS NOTES
[facility] Medicine Services Daily Progress Note    Patient Name: Benson Mclean  : 1950  MRN: [id number]  Primary Care Physician:  Rudolph Rooney MD  Date of admission: 2022    Previous notes and with minor updates.  Subjective    Chief Complaint: Shortness of breath fluid overload.    Patient is a 71 y.o. male with  past medical history of coronary artery disease status post CABG/cardiac stent placement, ESRD on HD Ixaued-Bmspvsqdp-Zqsqme, chronic systolic congestive heart failure, chronic atrial fibrillation, CVA with residual right-sided weakness, COPD on chronic oxygen 3L O2 via NC, ANNETTE on Cpap, previous PE,  HTN, HLD, type 2 diabetes mellitus complicated by neuropathy, anemia of renal disease, vitamin deficiencies, and dementia who presented to [facility] ED 2022 from dialysis center. Apparently he was sent to the ED for shortness of breath before completing his dialysis treatment. He had one hour left per nursing report. He also apparently did not have dialysis on Wednesday. The patient complains of shortness of breath and cough, possibly a fever. No increased lower extremity edema. He stated he is bedbound. He is a poor historian and not willing to offer a great deal of information.   Patient was seen in the emergency room and in the ED the patient had CXR that showed small right greater than left pleural effusions.  Bibasilar airspace disease, right greater than left, favored to represent atelectasis.  proBNP greater then 70,000, troponin 0.33, creatinine 2.74, BUN 15, normal potassium, normal WBC, normal lactate, Hgb 11.0.  Blood cultures drawn.  COVID-negative.  Pt on his home rate of 3L O2 via nasal cannula. Nephrology was consulted with plans for hemodialysis.  He was admitted to the hospitalist group for further treatment of acute on chronic respiratory failure secondary to fluid overload, needs dialysis.      2022.  Patient is complaining that  he cannot eat normal food because he is on a mechanical soft diet.  Speech therapy following    5/9/2022.  Patient was seen and examined.  Patient reports having a good night sleep.  Patient is on clear liquid diet now and n.p.o. after midnight.    5/10/2022.  Patient was seen and examined.  Infectious disease consult was completed because of strep bovis bacteremia.  Endoscopy is planned soon.  Eliquis is on hold.    5/12/2022  The patient overall was feeling somewhat better.  He is wanting of fig condon bar.  He otherwise had no requests and no complaints.     5/13/2022  Patient is going for cardioversion and MARSHA cath later today.    5/14/2022  The patient is doing well.  He remains on some oxygen and is overall showing improvement.    ROS negative except as above    Objective      Vitals:   Temp:  [96.6 °F (35.9 °C)-97.4 °F (36.3 °C)] 97.4 °F (36.3 °C)  Heart Rate:  [58-71] 69  Resp:  [16-20] 20  BP: ()/(36-62) 135/62  Flow (L/min):  [2] 2    Physical Exam  Vitals reviewed.   Constitutional:   Patient appears chronically ill.but appears improved over yesterday.     Comments: Chronically weak appearing   patient is lying comfortably in bed and in no acute distress.  HENT:      Head: Normocephalic.   Cardiovascular:      Rate and Rhythm: Normal rate.   Pulmonary:      Effort: Pulmonary effort is normal.   Abdominal:      General: Abdomen is flat.      Palpations: Abdomen is soft.  There is no tenderness to palpation no palpable organomegaly or masses.  Musculoskeletal:         General: Normal range of motion.      Cervical back: Normal range of motion.   Skin:     General: Skin is warm.   Neurological:      General: No focal deficit present.      Mental Status: He is alert and oriented to person, place, and time.   Psychiatric:         Mood and Affect: Mood normal.         Behavior: Behavior normal.        Result Review    Result Review:  I have personally reviewed the results from the time of this admission  to 5/14/2022 17:38 EDT and agree with these findings:  [x]  Laboratory  [x]  Microbiology  [x]  Radiology  []  EKG/Telemetry   []  Cardiology/Vascular   []  Pathology  []  Old records  []  Other:  Most notable findings include:     Radiology Results (last 21 days)    Procedure Component Value Units Date/Time   CT Chest Without Contrast Diagnostic [[id number]] Vinh as Reviewed   Order Status: Completed Collected: 05/07/22 1740    Updated: 05/07/22 1758   Narrative:     Examination: CT CHEST WO CONTRAST DIAGNOSTIC-       Date of Exam: 5/7/2022 5:14 PM       Indication: Possible pneumonia; N18.6-End stage renal disease;   Z99.2-Dependence on renal dialysis; R06.00-Dyspnea, unspecified;   Z91.15-Patient's noncompliance with renal dialysis; S37-Lwbnujp   effusion, not elsewhere classified.       Comparison: Correlation with chest radiograph 05/06/2022.       Technique: Non-contrast axial volumetric CT imaging of the chest was   performed. Automated exposure control and iterative reconstruction   methods were used.       Findings:   There is moderate to large right and small-to-moderate left pleural   effusion. There is complete atelectasis of the right lower lobe and   atelectasis of approximately 60% of the left lower lobe. There is   partial atelectasis of both upper lobes and the right middle lobe.   Overall, aerated lung occupies approximately 40% of the thoracic cavity.   There is a 7 mm nodule in the subpleural anterior left upper lobe on   axial image 36. There is no pneumothorax. Central airways are patent.   There is a small amount of mucoid debris in the right posterior trachea.       Thyroid, remainder of the trachea and esophagus appear within normal   limits. There is severe native coronary artery calcification. The   patient appears status post median sternotomy and CABG. The heart is   enlarged. There is diminished attenuation of the blood pool suggesting   anemia. The main pulmonary artery is enlarged up  to 42 mm suggesting   increased right heart pressure. No pericardial effusion. No pathologic   mediastinal adenopathy.       There is skin thickening and edema in the midline upper back area there   is marked diffuse muscular atrophy. There is a small volume of   perihepatic ascites. There is a left kidney cyst at the inferior pole   measuring 28 mm. There is an exophytic simple right kidney cyst   measuring 24 mm. The patient is status post cholecystectomy. There is   mild bilateral gynecomastia. There is no acute osseous abnormality or   destructive bone lesion. The bones appear demineralized. There are mild   degenerative changes.       Impression:            1. Moderate to large right and small-to-moderate left pleural effusions   with significant passive atelectasis. Aerated lung occupies   approximately 40% of the anatomic lung space.   2. Enlarged main pulmonary artery suggesting increased right heart   pressure.   3. Coronary artery disease status post CABG.   5. Marked atrophy of the musculature diffusely.   6. Gynecomastia.       Electronically Signed By-Rigoberto Hill MD On:5/7/2022 5:45 PM   This report was finalized on [id number] by  Rigoberto Hill MD.          Wounds (last 24 hours)     LDA Wound     Row Name 05/14/22 1200 05/14/22 0800 05/14/22 0000       Wound 05/06/22 1706 Right heel    Wound - Properties Group Placement Date: 05/06/22  -MAGNO Placement Time: 1706 -JP Present on Hospital Admission: Y  -MAGNO Side: Right  -MAGNO Location: heel  -MAGNO    Dressing Appearance -- open to air  -SM --    Closure Open to air  -SM Open to air  -SM --  -KR    Base -- -- --  -KR    Drainage Amount none  -SM none  -SM --    Care, Wound -- pressure removed  -SM --    Dressing Care -- open to air  -SM --    Retired Wound - Properties Group Placement Date: 05/06/22  -MAGNO Placement Time: 1706 -JP Present on Hospital Admission: Y  -MAGNO Side: Right  -MAGNO Location: heel  -MAGNO    Retired Wound - Properties Group Date first  assessed: 05/06/22  -MAGNO Time first assessed: 1706  -MAGNO Present on Hospital Admission: Y  -MAGNO Side: Right  -MAGNO Location: heel  -MAGNO       Wound 03/30/22 0343 Left heel    Wound - Properties Group Placement Date: 03/30/22  -AS Placement Time: 0343  -AS Present on Hospital Admission: Y  -AS Side: Left  -AS Location: heel  -AS    Pressure Injury Stage -- DTPI  -SM --    Dressing Appearance -- open to air  -SM --    Closure Open to air  -SM Open to air  -SM --  -KR    Base dry;red  -SM dry;red  -SM --  -KR    Dressing Care -- open to air  -SM --    Retired Wound - Properties Group Placement Date: 03/30/22  -AS Placement Time: 0343 -AS Present on Hospital Admission: Y  -AS Side: Left  -AS Location: heel  -AS    Retired Wound - Properties Group Date first assessed: 03/30/22  -AS Time first assessed: 0343  -AS Present on Hospital Admission: Y  -AS Side: Left  -AS Location: heel  -AS       Wound 05/06/22 1710 Right anterior greater trochanter    Wound - Properties Group Placement Date: 05/06/22  -MAGNO Placement Time: 1710  -MAGNO Present on Hospital Admission: Y  -MAGNO Side: Right  -MAGNO Orientation: anterior  -MAGNO Location: greater trochanter  -MAGNO    Dressing Appearance -- open to air  -SM --    Closure Open to air  -SM Open to air  -SM --  -KR    Base white;scab  -SM white;scab  -SM --  -KR    Drainage Amount none  -SM none  -SM --    Care, Wound -- barrier applied  -SM --    Retired Wound - Properties Group Placement Date: 05/06/22  -MAGNO Placement Time: 1710  -MAGNO Present on Hospital Admission: Y  -MAGNO Side: Right  -MAGNO Orientation: anterior  -MAGNO Location: greater trochanter  -MAGNO    Retired Wound - Properties Group Date first assessed: 05/06/22  -MAGNO Time first assessed: 1710  -MAGNO Present on Hospital Admission: Y  -MAGNO Side: Right  -MAGNO Location: greater trochanter  -MAGNO       Wound 03/30/22 0341 Left posterior thigh    Wound - Properties Group Placement Date: 03/30/22  -AS Placement Time: 0341  -AS Present on Hospital Admission: Y  -AS  Side: Left  -AS Orientation: posterior  -AS Location: thigh  -AS    Pressure Injury Stage -- 3  -SM --    Closure Open to air  -SM Open to air  -SM --  -KR    Base -- -- --  -KR    Drainage Amount scant;copious  -SM scant;copious  -SM --    Care, Wound -- barrier applied  -SM --    Dressing Care -- skin barrier agent applied  -SM --    Retired Wound - Properties Group Placement Date: 03/30/22  -AS Placement Time: 0341  -AS Present on Hospital Admission: Y  -AS Side: Left  -AS Orientation: posterior  -AS Location: thigh  -AS    Retired Wound - Properties Group Date first assessed: 03/30/22  -AS Time first assessed: 0341  -AS Present on Hospital Admission: Y  -AS Side: Left  -AS Location: thigh  -AS       Wound 03/30/22 0340 perineum Pressure Injury    Wound - Properties Group Placement Date: 03/30/22  -AS Placement Time: 0340  -AS Present on Hospital Admission: Y  -AS Location: perineum  -AS Primary Wound Type: Pressure inj  -AS    Dressing Appearance -- dry;moist drainage  -SM --    Closure OZIEL;Sutures  -SM OZIEL;Sutures  -SM --  -KR    Base -- -- --  -KR    Periwound excoriated  -SM excoriated  -SM --    Drainage Amount none  -SM none  -SM --    Care, Wound -- barrier applied  -SM --    Retired Wound - Properties Group Placement Date: 03/30/22  -AS Placement Time: 0340  -AS Present on Hospital Admission: Y  -AS Location: perineum  -AS Primary Wound Type: Pressure inj  -AS    Retired Wound - Properties Group Date first assessed: 03/30/22  -AS Time first assessed: 0340  -AS Present on Hospital Admission: Y  -AS Location: perineum  -AS Primary Wound Type: Pressure inj  -AS       Wound 05/11/22 0230 Right posterior hand Skin Tear    Wound - Properties Group Placement Date: 05/11/22  - Placement Time: 0230  - Side: Right  - Orientation: posterior  - Location: hand  - Primary Wound Type: Skin tear  -    Dressing Appearance -- dry;intact  -SM --    Closure OZIEL  -SM OZIEL  -SM --  -KR    Base dressing in place,  unable to visualize  -SM dressing in place, unable to visualize  -SM --  -KR    Periwound ecchymotic  -SM ecchymotic  -SM --    Retired Wound - Properties Group Placement Date: 05/11/22  - Placement Time: 0230  - Side: Right  - Orientation: posterior  - Location: hand  - Primary Wound Type: Skin tear  -    Retired Wound - Properties Group Date first assessed: 05/11/22  - Time first assessed: 0230  - Side: Right  - Location: hand  - Primary Wound Type: Skin tear  -    Row Name 05/13/22 1944             Wound 05/06/22 1706 Right heel    Wound - Properties Group Placement Date: 05/06/22  -MAGNO Placement Time: 1706  -MAGNO Present on Hospital Admission: Y  -MAGNO Side: Right  -MAGNO Location: heel  -MAGNO      Dressing Appearance open to air;dry  -KR      Closure Open to air  -KR      Base dry;yellow;red  -KR      Retired Wound - Properties Group Placement Date: 05/06/22  -MAGNO Placement Time: 1706 -MAGNO Present on Hospital Admission: Y  -MAGNO Side: Right  -MAGNO Location: heel  -MAGNO      Retired Wound - Properties Group Date first assessed: 05/06/22  -MAGNO Time first assessed: 1706  -MAGNO Present on Hospital Admission: Y  -MAGNO Side: Right  -MAGNO Location: heel  -MAGNO              Wound 03/30/22 0343 Left heel    Wound - Properties Group Placement Date: 03/30/22  -AS Placement Time: 0343  -AS Present on Hospital Admission: Y  -AS Side: Left  -AS Location: heel  -AS      Dressing Appearance open to air  -KR      Closure Open to air  -KR      Base dry;red;blanchable  -KR      Retired Wound - Properties Group Placement Date: 03/30/22  -AS Placement Time: 0343  -AS Present on Hospital Admission: Y  -AS Side: Left  -AS Location: heel  -AS      Retired Wound - Properties Group Date first assessed: 03/30/22  -AS Time first assessed: 0343  -AS Present on Hospital Admission: Y  -AS Side: Left  -AS Location: heel  -AS              Wound 05/06/22 1710 Right anterior greater trochanter    Wound - Properties Group Placement Date: 05/06/22   -MAGNO Placement Time: 1710 -MAGNO Present on Hospital Admission: Y  -MAGNO Side: Right  -MAGNO Orientation: anterior  -MAGNO Location: greater trochanter  -MAGNO      Dressing Appearance open to air  -KR      Closure Open to air  -KR      Base white;scab  -KR      Retired Wound - Properties Group Placement Date: 05/06/22  -MAGNO Placement Time: 1710  -MAGNO Present on Hospital Admission: Y  -MAGNO Side: Right  -MAGNO Orientation: anterior  -MAGNO Location: greater trochanter  -MAGNO      Retired Wound - Properties Group Date first assessed: 05/06/22  -MAGNO Time first assessed: 1710  -MAGNO Present on Hospital Admission: Y  -MAGNO Side: Right  -MAGNO Location: greater trochanter  -MAGNO              Wound 03/30/22 0341 Left posterior thigh    Wound - Properties Group Placement Date: 03/30/22  -AS Placement Time: 0341  -AS Present on Hospital Admission: Y  -AS Side: Left  -AS Orientation: posterior  -AS Location: thigh  -AS      Dressing Appearance open to air  -KR      Closure Open to air  -KR      Base white;scab;pink  -KR      Retired Wound - Properties Group Placement Date: 03/30/22  -AS Placement Time: 0341  -AS Present on Hospital Admission: Y  -AS Side: Left  -AS Orientation: posterior  -AS Location: thigh  -AS      Retired Wound - Properties Group Date first assessed: 03/30/22  -AS Time first assessed: 0341  -AS Present on Hospital Admission: Y  -AS Side: Left  -AS Location: thigh  -AS              Wound 03/30/22 0340 perineum Pressure Injury    Wound - Properties Group Placement Date: 03/30/22  -AS Placement Time: 0340  -AS Present on Hospital Admission: Y  -AS Location: perineum  -AS Primary Wound Type: Pressure inj  -AS      Dressing Appearance open to air  -KR      Closure Open to air  -KR      Base red;moist  -KR      Retired Wound - Properties Group Placement Date: 03/30/22  -AS Placement Time: 0340  -AS Present on Hospital Admission: Y  -AS Location: perineum  -AS Primary Wound Type: Pressure inj  -AS      Retired Wound - Properties Group Date first  assessed: 03/30/22  -AS Time first assessed: 0340  -AS Present on Hospital Admission: Y  -AS Location: perineum  -AS Primary Wound Type: Pressure inj  -AS              Wound 05/11/22 0230 Right posterior hand Skin Tear    Wound - Properties Group Placement Date: 05/11/22  - Placement Time: 0230 - Side: Right  - Orientation: posterior  - Location: hand  - Primary Wound Type: Skin tear  -      Dressing Appearance dry;intact;no drainage  -KR      Closure OZIEL  -KR      Base dressing in place, unable to visualize  -KR      Retired Wound - Properties Group Placement Date: 05/11/22  - Placement Time: 0230  - Side: Right  - Orientation: posterior  - Location: hand  - Primary Wound Type: Skin tear  -      Retired Wound - Properties Group Date first assessed: 05/11/22  - Time first assessed: 0230  - Side: Right  - Location: hand  - Primary Wound Type: Skin tear  -            User Key  (r) = Recorded By, (t) = Taken By, (c) = Cosigned By    Initials Name Provider Type    Jessica Isaac, RN Registered Nurse    Zainab Griffiths RN Registered Nurse    Darleen Piper RN Registered Nurse    Judy Cueva, RN Registered Nurse    AS Maricruz Mi RN Registered Nurse                     Assessment/Plan    71-year-old gentleman with fluid overload and bacteremia     Active Hospital Problems:          Active Hospital Problems     Diagnosis     • **Fluid overload     • Chronic respiratory failure with hypoxia, on home oxygen therapy (HCC)     • Elevated troponin     • Hypothyroidism (acquired)     • End stage renal disease (HCC)     • Anemia of renal disease     • Mixed hyperlipidemia     • Major depressive disorder, recurrent, mild (HCC)     • Flaccid hemiplegia of right dominant side as late effect of cerebral infarction ([facility])     • COPD with acute exacerbation ([facility])     • S/P CABG (coronary artery bypass graft)     • Essential hypertension     • ANNETTE treated with BiPAP         Plan:      Shortness of breath secondary to fluid overload  Chronic respiratory failure with oxygen dependence    -Improving.  -CXR: small right greater than left pleural effusions.  Bibasilar airspace disease, right greater than left, favored to represent atelectasis.   -proBNP >70,000 (previous proBNP march 2022 was also >70,000)       -will recheck in the a.m.  -pt on his home rate of 3L O2 via NC  -pt did not complete dialysis  -nephrology consulted appreciate assistance     Possible strep bovis bacteremia.  ID is following.  IV antibiotics ordered  Pending culture of dialysis catheter  Pending final identification  Echo pending  Appreciate ID assistance    Chronically elevated troponin  -troponin 0.33 compared to troponin 0.146 on 3/12/2022.   -Will trend      ESRD on HD MWF  -on midodrine for BP support  -nephrology to manage     CVA with right sided residual weakness  -on eliquis, statin       -restart Elliquis in the am.    CAD s/p CABG, PCI  Chronic atrial fibrillation  Hyperlipidemia  -chronic, controlled.   -continue Eliquis, atorvastatin, and metoprolol  -For MARSHA cardiac cath for ablation treatment today    DM type 2  -hemoglobin A1c 6.3 on 3/12/2022 and previous admission basal insulin stopped due to hypoglycemia  -SSI AC/HS     Hypothyroidism  -Continue home synthroid     Major depressive disorder, recurrent, mild   Dementia  -Continue home zoloft, aricept     ANNETTE  -cpap qhs      Obesity (BMI 30-39.9)  BMI 36.92  -Lifestyle modifications to reduce cardiovascular risk factors     Dysphagia  Patient complaining about his current diet.  Speech following.     DVT prophylaxis: eliquis      CODE STATUS:    Admission Status:  I believe this patient meets observation status.     I discussed the patient's findings and my recommendations with patient.     This patient has been examined wearing appropriate Personal Protective Equipment.    05/14/22      Electronically signed by Kasandra Ocampo MD, FACP,  05/14/22, 17:38 EDT.      [facility] Hospitalist Team

## 2022-05-14 NOTE — PLAN OF CARE
Goal Outcome Evaluation:  Outcome Evaluation: Pt is a 72 y/o M admitted from ECF with fluid overload d/t noncompliance with dialysis, bilateral pleural effusions, dyspnea, and stage 5 CKD.  Pt has hx of multiple hospitalizations.  Pt lives at [facility] for LT, reports he is dependent with ADLs and mostly bed bound.  Pt reports they feed him at the nursing home.  Per chart records, pt was MAX A x2 for bed mobility last time he was here.  5-6 months ago pt was able to complete SPS transfers per record.  Pt required MAX A-DEP for self feeding task with breakfast; aid reported at recent hospitalizations pt was unable to feed self as well.  Pt presents with severe deficits in self care, bed mobility, strength, activity tolerance indicating need for skilled OT services.  OT recommending return to skilled rehab.

## 2022-05-14 NOTE — PROGRESS NOTES
Daily Progress Note        Chronic systolic (congestive) heart failure (HCC)    S/P CABG (coronary artery bypass graft)    Primary hypertension    Elevated troponin    ANNETTE treated with BiPAP    Major depressive disorder, recurrent, mild (Former[facility] [place])    Mixed hyperlipidemia    Flaccid hemiplegia affecting right dominant side ([facility] [place])    Diabetic neuropathy (Former[facility] [place])    Chronic obstructive pulmonary disease with (acute) exacerbation ([facility] [place])    Anemia of renal disease    End stage renal disease ([facility] [place])    Chronic respiratory failure with hypoxia, on home oxygen therapy ([facility]ly [facility] [place])    Other specified hypothyroidism    Fluid overload    Stage 5 chronic kidney disease on chronic dialysis ([facility] [place])    Bacteremia    Normocytic anemia due to blood loss    Essential (primary) hypertension    Cardiopulmonary arrest with successful resuscitation ([facility] [place])    Ventricular fibrillation ([facility] [place])      Assessment    S/p Code blue was in V-fib. was defibrillated x1.  Patient was given an epi x1, bicarb x2. Upon pulse checks patient was found to be with a pulse and in normal sinus with a left bundle. Patient was moaning and turning his head. He became more and more alert.  Patient did not require intubation.       He continued to oxygenate well with nasal cannula. Within about 5 minutes the patient was asking to sit up and asking for water     Bacteremia with Streptococcus gallolyticus previously called Streptococcus bovis could be associated with GI malignancy  5/7/2020 repeat blood cultures negative    4/13/2022 MARSHA to rule out endocarditis due to Streptococcus Gallolyticus  bacteria  Biatrial and biventricular enlargement.  Diffuse left ventricular hypocontractility with ejection fraction of 25 to 30%.  Mild smoking effect in the left atrium.  Small PFO.  Mild mitral aortic and tricuspid regurgitation is present.       ESRD on HD [id number],   chronic systolic congestive heart failure,   CAD s/p CABG, cardiac stent,   chronic atrial fibrillation,   CVA  with residual right-sided weakness,   COPD on chronic oxygen 3L O2 via NC,   ANNETTE on Cpap,   previous PE,    HTN,   HLD,   type 2 diabetes mellitus complicated by neuropathy,   anemia of renal disease,   vitamin deficiencies,   dementia         Recommendations:     Antibiotics currently on Rocephin     Hemodialysis     BiPAP 12/5 to be used at night and as needed during the day    BP control, midodrine  Amiodarone PO  GI prophylaxis Protonix  Synthroid    Colonoscopy completed, may need CT abdomen             LOS: 8 days     Subjective         Objective     Vital signs for last 24 hours:  Vitals:    05/14/22 0100 05/14/22 0105 05/14/22 0115 05/14/22 0500   BP: 100/43 106/55 96/52 90/52   Pulse: 65 65 65 65   Resp:   20 18   Temp:   97 °F (36.1 °C) 97.2 °F (36.2 °C)   TempSrc:   Axillary Axillary   SpO2: 100% 100% 100% 95%   Weight:    105 kg (230 lb 8 oz)   Height:           Intake/Output last 3 shifts:  I/O last 3 completed shifts:  In: 1060 [P.O.:760; I.V.:300]  Out: 0   Intake/Output this shift:  No intake/output data recorded.      Radiology  Imaging Results (Last 24 Hours)     ** No results found for the last 24 hours. **          Labs:  Results from last 7 days   Lab Units 05/13/22  0408   WBC 10*3/mm3 6.70   HEMOGLOBIN g/dL 10.4*   HEMATOCRIT % 34.3*   PLATELETS 10*3/mm3 211     Results from last 7 days   Lab Units 05/13/22  0408   SODIUM mmol/L 133*   POTASSIUM mmol/L 4.4   CHLORIDE mmol/L 98   CO2 mmol/L 25.0   BUN mg/dL 16   CREATININE mg/dL 2.90*   CALCIUM mg/dL 8.2*   BILIRUBIN mg/dL 0.3   ALK PHOS U/L 49   ALT (SGPT) U/L 6   AST (SGOT) U/L 11   GLUCOSE mg/dL 97     Results from last 7 days   Lab Units 05/11/22  0253   PH, ARTERIAL pH units 7.362   PO2 ART mm Hg 100.1   PCO2, ARTERIAL mm Hg 47.0   HCO3 ART mmol/L 26.7     Results from last 7 days   Lab Units 05/13/22  0408 05/12/22  0501 05/11/22  0555   ALBUMIN g/dL 2.90* 3.00* 2.80*     Results from last 7 days   Lab Units 05/13/22  0408   TROPONIN T  ng/mL 0.583*         Results from last 7 days   Lab Units 05/13/22  0408   MAGNESIUM mg/dL 2.3     Results from last 7 days   Lab Units 05/13/22  0408 05/10/22  0706   INR  1.12* 1.09               Meds:   SCHEDULE  amiodarone, 200 mg, Oral, Q24H  aspirin, 81 mg, Oral, Daily  atorvastatin, 40 mg, Oral, Nightly  budesonide, 0.5 mg, Nebulization, BID  cefTRIAXone, 2 g, Intravenous, Q24H  clopidogrel, 75 mg, Oral, Daily  donepezil, 10 mg, Oral, Nightly  insulin lispro, 0-7 Units, Subcutaneous, TID AC  ipratropium-albuterol, 3 mL, Nebulization, TID - RT  levothyroxine, 50 mcg, Oral, Q AM  lidocaine, 20 mL, Intradermal, Once  metoprolol tartrate, 25 mg, Oral, BID  midodrine, 15 mg, Oral, TID AC  pantoprazole, 40 mg, Oral, QAM  polyethylene glycol, 17 g, Oral, BID  sertraline, 50 mg, Oral, Daily  sodium chloride, 10 mL, Intravenous, Q12H  sodium chloride, 10 mL, Intravenous, Q12H  sorbitol, 50 mL, Oral, Daily      Infusions     PRNs  •  acetaminophen **OR** acetaminophen **OR** acetaminophen  •  acetaminophen  •  aluminum-magnesium hydroxide-simethicone  •  atropine  •  dextrose  •  dextrose  •  glucagon (human recombinant)  •  insulin lispro **AND** insulin lispro  •  ipratropium-albuterol  •  melatonin  •  Morphine  •  ondansetron **OR** ondansetron  •  oxyCODONE  •  sodium chloride  •  sodium chloride  •  sodium chloride  •  sodium chloride    Physical Exam:  Physical Exam  Vitals reviewed.   Pulmonary:      Breath sounds: Rales present.   Musculoskeletal:         General: Swelling present.   Skin:     General: Skin is warm and dry.   Neurological:      Mental Status: He is alert.         ROS  Review of Systems   Respiratory: Positive for cough and shortness of breath.    Neurological: Positive for weakness.       I reviewed the patient's new clinical results    Electronically signed by FABIOLA Bui
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
## 2022-05-14 NOTE — PROGRESS NOTES
Referring Provider: Kasandra Ocampo MD    Reason for follow-up:  Shortness of breath  Recent ventricular fibrillation.  Bacteremia  Status post stent       Patient Care Team:  Rudolph Rooney MD as PCP - General (Family Medicine)  Samantha Zabala APRN as PCP - Family Medicine  Jose G Rm MD as Consulting Physician (Cardiology)    Subjective .  Feeling better  ROS    Since I have last seen him yesterday, the patient has been without any chest discomfort ,shortness of breath, palpitations, dizziness or syncope.  Denies having any headache ,abdominal pain ,nausea, vomiting , diarrhea constipation, loss of weight or loss of appetite.  Denies having any excessive bruising ,hematuria or blood in the stool.    Review of all systems negative except as indicated    History  Past Medical History:   Diagnosis Date   • A-fib ([facility]) 8/3/2021   • Anemia    • Appetite loss    • Arthritis    • Brain bleed ([facility])    • Carpal tunnel syndrome on left    • CHF (congestive heart failure) ([facility])    • Chronic left-sided low back pain without sciatica 12/27/2017   • CKD (chronic kidney disease) stage 3, GFR 30-59 ml/min ([facility])    • Closed fracture of seventh thoracic vertebra ([facility]) 8/23/2020   • Cognitive communication deficit 12/1/2020   • COPD (chronic obstructive pulmonary disease) ([facility])    • Coronary artery disease    • COVID-19 2/19/2021   • Cytokine release syndrome, grade 1 5/24/2021   • Depression    • Diabetic neuropathy ([facility])    • Dialysis patient ([facility])     mon wed fri   • DM2 (diabetes mellitus, type 2) ([facility])    • GERD (gastroesophageal reflux disease)    • Hyperlipidemia    • Hypertension    • Hypogonadism in male    • Neuropathy    • Nonsustained ventricular tachycardia ([facility]) 7/23/2021   • Obesity    • Paroxysmal atrial fibrillation ([facility]) 12/1/2020   • Sleep apnea    • Stroke ([facility])    • Unsteady gait        Past Surgical History:   Procedure Laterality Date   • ANGIOPLASTY      X2   • APPENDECTOMY     • ARTERIOVENOUS  FISTULA/SHUNT SURGERY Left 1/20/2022    Procedure: LEFT BRACHIAL CEPHALIC ARTERIAL VENOUS FISTULA CREATION;  Surgeon: Yony Davila MD;  Location: [facility] MAIN OR;  Service: Vascular;  Laterality: Left;   • CARDIAC CATHETERIZATION  11/13/2015   • CARDIAC CATHETERIZATION N/A 5/24/2021    Procedure: Left Heart Cath and coronary angiogram;  Surgeon: Jose G Rm MD;  Location:  [facility] CATH INVASIVE LOCATION;  Service: Cardiovascular;  Laterality: N/A;   • CARDIAC CATHETERIZATION  5/24/2021    Procedure: Saphenous Vein Graft;  Surgeon: Jose G Rm MD;  Location:  [facility] CATH INVASIVE LOCATION;  Service: Cardiovascular;;   • CARDIAC CATHETERIZATION N/A 5/24/2021    Procedure: Percutaneous Coronary Intervention;  Surgeon: Bernabe Zambrano MD;  Location:  [facility] CATH INVASIVE LOCATION;  Service: Cardiology;  Laterality: N/A;   • CARDIAC CATHETERIZATION N/A 5/24/2021    Procedure: Stent NIELS coronary;  Surgeon: Bernabe Zambrano MD;  Location: [facility] CATH INVASIVE LOCATION;  Service: Cardiology;  Laterality: N/A;   • CARDIAC CATHETERIZATION N/A 5/26/2021    Procedure: Percutaneous Coronary Intervention;  Surgeon: Bernabe Zambrano MD;  Location:  [facility] CATH INVASIVE LOCATION;  Service: Cardiovascular;  Laterality: N/A;   • CARDIAC CATHETERIZATION N/A 5/26/2021    Procedure: Stent NIELS bypass graft;  Surgeon: Bernabe Zambrano MD;  Location:  [facility] CATH INVASIVE LOCATION;  Service: Cardiovascular;  Laterality: N/A;   • CARDIAC ELECTROPHYSIOLOGY PROCEDURE N/A 5/24/2021    Procedure: Temporary Pacemaker;  Surgeon: Bernabe Zambrano MD;  Location: [facility] CATH INVASIVE LOCATION;  Service: Cardiology;  Laterality: N/A;   • CARDIAC ELECTROPHYSIOLOGY PROCEDURE N/A 5/26/2021    Procedure: Temporary Pacemaker;  Surgeon: Bernabe Zambrano MD;  Location: [facility] CATH INVASIVE LOCATION;  Service: Cardiovascular;  Laterality: N/A;   • CARPAL TUNNEL RELEASE Left 04/29/2018    carpal tunnel- lt hand// other hand surgeries    • CATARACT  EXTRACTION, BILATERAL  2002    Dr. Lux Acosta   • CHOLECYSTECTOMY     • COLON RESECTION  2005   • COLONOSCOPY N/A 5/10/2022    Procedure: COLONOSCOPY with polypectomy x3;  Surgeon: Herbie Keane MD;  Location: [facility] ENDOSCOPY;  Service: Gastroenterology;  Laterality: N/A;  colon polyps;internal hemorrhoids   • CORONARY ANGIOPLASTY     • CORONARY ANGIOPLASTY WITH STENT PLACEMENT  11/13/2015    PTCA stent to proximal in stent and mid to distal lad   • CORONARY ANGIOPLASTY WITH STENT PLACEMENT  09/16/2016    PTCA stent to mid lad and stent to vein graft to marginal   • CORONARY ARTERY BYPASS GRAFT  2005    @ [facility]   • CYST REMOVAL      cyst removed from scrotum   • FOOT SURGERY Right 07/17/2018   • FOOT SURGERY Left 02/04/2019   • PORTACATH PLACEMENT     • TOE AMPUTATION Right     right toe removed D/T infected cut that went to the bone       Family History   Problem Relation Age of Onset   • Heart disease Mother    • Heart disease Father    • Diabetes Sister    • Heart disease Sister    • Diabetes Brother    • Mental illness Brother        Social History     Tobacco Use   • Smoking status: Former Smoker     Packs/day: 1.00     Years: 60.00     Pack years: 60.00     Types: Cigarettes   • Smokeless tobacco: Never Used   • Tobacco comment: Patient quit smoking 11/2020   Vaping Use   • Vaping Use: Never used   Substance Use Topics   • Alcohol use: No   • Drug use: Yes     Frequency: 1.0 times per week     Types: Marijuana     Comment: socially        Medications Prior to Admission   Medication Sig Dispense Refill Last Dose   • albuterol sulfate  (90 Base) MCG/ACT inhaler Inhale 2 puffs Every 4 (Four) Hours.   5/6/2022 at Unknown time   • apixaban (ELIQUIS) 5 MG tablet tablet Take 1 tablet by mouth Every 12 (Twelve) Hours. Indications: Atrial Fibrillation 60 tablet  5/6/2022 at Unknown time   • atorvastatin (LIPITOR) 40 MG tablet Take 40 mg by mouth Every Night.   5/5/2022 at Unknown time   • budesonide (Pulmicort)  0.5 MG/2ML nebulizer solution Take 2 mL by nebulization 2 (Two) Times a Day.   5/6/2022 at Unknown time   • cholecalciferol (VITAMIN D3) 25 MCG (1000 UT) tablet Take 1,000 Units by mouth Daily.   5/6/2022 at Unknown time   • docusate sodium (COLACE) 100 MG capsule Take 100 mg by mouth Daily.   5/6/2022 at Unknown time   • donepezil (ARICEPT) 10 MG tablet Take 10 mg by mouth Every Night.   5/5/2022 at Unknown time   • fluticasone (FLONASE) 50 MCG/ACT nasal spray 2 sprays by Each Nare route 2 (Two) Times a Day.   5/6/2022 at Unknown time   • guaiFENesin (MUCINEX) 600 MG 12 hr tablet Take 600 mg by mouth 2 (Two) Times a Day.   5/6/2022 at Unknown time   • HYDROcodone-acetaminophen (NORCO) 5-325 MG per tablet Take 1 tablet by mouth 3 (Three) Times a Day.   5/6/2022 at Unknown time   • ipratropium-albuterol (DUO-NEB) 0.5-2.5 mg/3 ml nebulizer Take 3 mL by nebulization 3 (Three) Times a Day. 360 mL  5/6/2022 at Unknown time   • ipratropium-albuterol (DUO-NEB) 0.5-2.5 mg/3 ml nebulizer Take 3 mL by nebulization Every 4 (Four) Hours As Needed for Wheezing. 360 mL  5/6/2022 at Unknown time   • levothyroxine (SYNTHROID, LEVOTHROID) 50 MCG tablet Take 50 mcg by mouth Every Morning.   5/6/2022 at Unknown time   • Metoprolol Tartrate 37.5 MG tablet Take 37.5 mg by mouth 2 (Two) Times a Day. Hold for SBP <110 or HR < 60   5/6/2022 at Unknown time   • midodrine (PROAMATINE) 10 MG tablet Take 10 mg by mouth 3 (Three) Times a Day Before Meals. Hold for BP > 140/90   5/6/2022 at Unknown time   • omeprazole (priLOSEC) 20 MG capsule Take 20 mg by mouth Daily.   5/6/2022 at Unknown time   • polyethylene glycol (MiraLax) 17 g packet Take 17 g by mouth 2 (Two) Times a Day.   5/6/2022 at Unknown time   • sertraline (ZOLOFT) 50 MG tablet Take 50 mg by mouth Daily.   5/6/2022 at Unknown time   • vitamin B-12 (CYANOCOBALAMIN) 1000 MCG tablet Take 1,000 mcg by mouth Daily.   5/6/2022 at Unknown time   • bisacodyl (DULCOLAX) 10 MG suppository  Insert 10 mg into the rectum Daily As Needed for Constipation.   Unknown at Unknown time   • magnesium hydroxide (MILK OF MAGNESIA) 400 MG/5ML suspension Take 30 mL by mouth Daily As Needed for Constipation.   Unknown at Unknown time   • ondansetron (ZOFRAN) 4 MG tablet Take 4 mg by mouth Every 8 (Eight) Hours As Needed for Nausea or Vomiting.   Unknown at Unknown time   • phenylephrine-mineral oil-petrolatum (Preparation H) 0.25-14-74.9 % ointment hemorrhoidal ointment Insert 1 application into the rectum Daily As Needed.   Unknown at Unknown time       Allergies  Codeine    Scheduled Meds:amiodarone, 200 mg, Oral, Q24H  aspirin, 81 mg, Oral, Daily  atorvastatin, 40 mg, Oral, Nightly  budesonide, 0.5 mg, Nebulization, BID  cefTRIAXone, 2 g, Intravenous, Q24H  clopidogrel, 75 mg, Oral, Daily  donepezil, 10 mg, Oral, Nightly  insulin lispro, 0-7 Units, Subcutaneous, TID AC  ipratropium-albuterol, 3 mL, Nebulization, TID - RT  levothyroxine, 50 mcg, Oral, Q AM  lidocaine, 20 mL, Intradermal, Once  metoprolol tartrate, 25 mg, Oral, BID  midodrine, 15 mg, Oral, TID AC  pantoprazole, 40 mg, Oral, QAM  polyethylene glycol, 17 g, Oral, BID  sertraline, 50 mg, Oral, Daily  sodium chloride, 10 mL, Intravenous, Q12H  sodium chloride, 10 mL, Intravenous, Q12H  sorbitol, 50 mL, Oral, Daily      Continuous Infusions:   PRN Meds:.•  acetaminophen **OR** acetaminophen **OR** acetaminophen  •  acetaminophen  •  aluminum-magnesium hydroxide-simethicone  •  atropine  •  dextrose  •  dextrose  •  glucagon (human recombinant)  •  insulin lispro **AND** insulin lispro  •  ipratropium-albuterol  •  melatonin  •  Morphine  •  ondansetron **OR** ondansetron  •  oxyCODONE  •  sodium chloride  •  sodium chloride  •  sodium chloride  •  sodium chloride    Objective     VITAL SIGNS  Vitals:    05/14/22 0630 05/14/22 0634 05/14/22 0635 05/14/22 0640   BP:       Pulse: 71 70 71 70   Resp: 18 18 18 18   Temp:       TempSrc:       SpO2: 95% 97%  "95% 98%   Weight:       Height:           Flowsheet Rows    Flowsheet Row First Filed Value   Admission Height 177.8 cm (70\") Documented at 05/06/2022 1026   Admission Weight 104 kg (229 lb 0.9 oz) Documented at 05/06/2022 1026            Intake/Output Summary (Last 24 hours) at 5/14/2022 0709  Last data filed at 5/14/2022 0630  Gross per 24 hour   Intake 300 ml   Output 1000 ml   Net -700 ml        TELEMETRY: Atrial fibrillation    Physical Exam:  The patient is alert, oriented and in no distress.  Vital signs as noted above.  Exogenous obesity.  Head and neck revealed no carotid bruits or jugular venous distention.  No thyromegaly or lymphadenopathy is present  Lungs clear.  No wheezing.  Breath sounds are normal bilaterally.  Heart normal first and second heart sounds.  No murmur. No precordial rub is present.  No gallop is present.  Abdomen soft and nontender.  No organomegaly is present.  Extremities with good peripheral pulses without any pedal edema.  Cardiac cath site looks normal.  Skin warm and dry.  Musculoskeletal system is grossly normal  CNS grossly normal      Results Review:   I reviewed the patient's new clinical results.  Lab Results (last 24 hours)     Procedure Component Value Units Date/Time    POC Glucose Once [[id number]]  (Abnormal) Collected: 05/14/22 0700    Specimen: Blood Updated: 05/14/22 0709     Glucose 64 mg/dL      Comment: Serial Number: [id number]:  [id number]       POC Glucose Once [[id number]]  (Normal) Collected: 05/13/22 2006    Specimen: Blood Updated: 05/13/22 2007     Glucose 100 mg/dL      Comment: Serial Number: [id number]:  [id number]       POC Activated Clotting Time [[id number]]  (Abnormal) Collected: 05/13/22 1758    Specimen: Arterial Blood Updated: 05/13/22 1804     Activated Clotting Time  161 Seconds      Comment: Serial Number: [id number]:  [id number]       POC Activated Clotting Time [[id number]]  (Abnormal) Collected: 05/13/22 1711    Specimen: Arterial " Blood Updated: 05/13/22 1804     Activated Clotting Time  167 Seconds      Comment: Serial Number: [id number]:  [id number]       POC Activated Clotting Time [[id number]]  (Abnormal) Collected: 05/13/22 1443    Specimen: Arterial Blood Updated: 05/13/22 1704     Activated Clotting Time  208 Seconds      Comment: Serial Number: [id number]:  [id number]       POC Glucose Once [[id number]]  (Abnormal) Collected: 05/13/22 1651    Specimen: Blood Updated: 05/13/22 1652     Glucose 110 mg/dL      Comment: Serial Number: [id number]:  [id number]       POC Glucose Once [[id number]]  (Abnormal) Collected: 05/13/22 1630    Specimen: Blood Updated: 05/13/22 1631     Glucose 56 mg/dL      Comment: Serial Number: [id number]:  [id number]       POC Glucose Once [[id number]]  (Normal) Collected: 05/13/22 1314    Specimen: Blood Updated: 05/13/22 1315     Glucose 88 mg/dL      Comment: Serial Number: [id number]:  [id number]       POC Glucose Once [[id number]]  (Abnormal) Collected: 05/13/22 0748    Specimen: Blood Updated: 05/13/22 0750     Glucose 122 mg/dL      Comment: Serial Number: [id number]:  [id number]       POC Glucose Once [[id number]]  (Abnormal) Collected: 05/13/22 0719    Specimen: Blood Updated: 05/13/22 0720     Glucose 66 mg/dL      Comment: Serial Number: [id number]:  [id number]             Imaging Results (Last 24 Hours)     ** No results found for the last 24 hours. **      LAB RESULTS (LAST 7 DAYS)    CBC  Results from last 7 days   Lab Units 05/13/22  0408 05/12/22  0501 05/11/22  0555 05/11/22  0253 05/10/22  0706 05/09/22  0528 05/08/22  0446   WBC 10*3/mm3 6.70 7.20 11.20*  --  6.70 6.90 6.10   RBC 10*6/mm3 3.58* 3.47* 3.71*  --  4.14 3.65* 3.68*   HEMOGLOBIN g/dL 10.4* 10.3* 10.5*  --  11.9* 10.8* 10.9*   HEMOGLOBIN, POC g/dL  --   --   --  13.4  --   --   --    HEMATOCRIT % 34.3* 32.7* 34.9*  --  39.4 34.6* 35.8*   HEMATOCRIT POC %  --   --   --  39  --   --   --    MCV fL 96.0 94.2  94.0  --  95.2 94.7 97.2*   PLATELETS 10*3/mm3 211 208 234  --  213 200 173       BMP  Results from last 7 days   Lab Units 05/13/22  0408 05/12/22  0949 05/12/22  0501 05/11/22  0555 05/10/22  0706 05/09/22  0228 05/08/22  0446   SODIUM mmol/L 133*  --  133* 133* 135* 130* 133*   POTASSIUM mmol/L 4.4  --  4.2 3.2* 4.1 4.0 4.0   CHLORIDE mmol/L 98  --  100 97* 97* 93* 97*   CO2 mmol/L 25.0  --  23.0 24.0 24.0 24.0 25.0   BUN mg/dL 16  --  12 21 17 24* 19   CREATININE mg/dL 2.90*  --  2.48* 3.41* 2.86* 3.39* 2.94*   GLUCOSE mg/dL 97  --  122* 167* 132* 133* 116*   MAGNESIUM mg/dL 2.3 2.2 1.9 2.1  --  1.9 2.0   PHOSPHORUS mg/dL 3.2  --  2.0* 2.7  --  2.2* 2.2*       CMP   Results from last 7 days   Lab Units 05/13/22  0408 05/12/22  0501 05/11/22  0555 05/10/22  0706 05/09/22 0228 05/08/22  0446   SODIUM mmol/L 133* 133* 133* 135* 130* 133*   POTASSIUM mmol/L 4.4 4.2 3.2* 4.1 4.0 4.0   CHLORIDE mmol/L 98 100 97* 97* 93* 97*   CO2 mmol/L 25.0 23.0 24.0 24.0 24.0 25.0   BUN mg/dL 16 12 21 17 24* 19   CREATININE mg/dL 2.90* 2.48* 3.41* 2.86* 3.39* 2.94*   GLUCOSE mg/dL 97 122* 167* 132* 133* 116*   ALBUMIN g/dL 2.90* 3.00* 2.80* 2.90*  --  2.60*   BILIRUBIN mg/dL 0.3 0.3 0.3 0.4  --  0.4   ALK PHOS U/L 49 45 46 56  --  52   AST (SGOT) U/L 11 12 10 12  --  12   ALT (SGPT) U/L 6 7 8 6  --  5         BNP        TROPONIN  Results from last 7 days   Lab Units 05/13/22  0408   TROPONIN T ng/mL 0.583*       CoAg  Results from last 7 days   Lab Units 05/13/22  0408 05/10/22  0706   INR  1.12* 1.09       Creatinine Clearance  Estimated Creatinine Clearance: 28.4 mL/min (A) (by C-G formula based on SCr of 2.9 mg/dL (H)).    ABG  Results from last 7 days   Lab Units 05/11/22  0253   PH, ARTERIAL pH units 7.362   PCO2, ARTERIAL mm Hg 47.0   PO2 ART mm Hg 100.1   O2 SATURATION ART % 97.4   BASE EXCESS ART mmol/L 0.7       Radiology  No radiology results for the last day        EKG                      I personally viewed and  interpreted the patient's EKG/Telemetry data: Atrial fibrillation    ECHOCARDIOGRAM:    Results for orders placed during the hospital encounter of 05/06/22    Adult Transesophageal Echo (MARSHA) W/ Cont if Necessary Per Protocol    Interpretation Summary  Date of study  5/13/2022    Indications  Bacteremia  Assessment for bacterial endocarditis    Procedure  Anesthesia was provided by anesthesiologist with intravenous Diprivan.  MARSHA probe could be passed without difficulty.  Patient tolerated the procedure well.  No complications were noted.    Procedure performed  Transesophageal echocardiogram Doppler study (color continuous-wave and pulsed wave)    Results  Technically satisfactory study.  Mitral valve is structurally normal.  Tricuspid valve is normal.  Aortic valve is tricuspid and is normal.  Mild mitral aortic and tricuspid regurgitation is present.  Biatrial enlargement is present.  Left atrial appendage enlargement without clot.  Diffuse left ventricular enlargement and hypocontractility is present with ejection fraction of 25 to 30%.  No pericardial fusion or intracardiac thrombus is present.  Right atrium right ventricle enlargement is present.  Bubble study revealed PFO.  No pericardial effusion or intracardiac thrombus is seen.    Impression  Biatrial and biventricular enlargement.  Diffuse left ventricular hypocontractility with ejection fraction of 25 to 30%.  Mild smoking effect in the left atrium.  Small PFO.  Mild mitral aortic and tricuspid regurgitation is present.          STRESS MYOVIEW:    Cardiolite (Tc-99m Sestamibi) stress test    CARDIAC CATHETERIZATION:            OTHER:         Assessment & Plan     Principal Problem:    Chronic systolic (congestive) heart failure (HCC)  Active Problems:    S/P CABG (coronary artery bypass graft)    Primary hypertension    Elevated troponin    ANNETTE treated with BiPAP    Major depressive disorder, recurrent, mild (HCC)    Mixed hyperlipidemia    Flaccid  hemiplegia affecting right dominant side (HCC)    Diabetic neuropathy (HCC)    Chronic obstructive pulmonary disease with (acute) exacerbation (HCC)    Anemia of renal disease    End stage renal disease (HCC)    Chronic respiratory failure with hypoxia, on home oxygen therapy (HCC)    Other specified hypothyroidism    Fluid overload    Stage 5 chronic kidney disease on chronic dialysis (HCC)    Bacteremia    Normocytic anemia due to blood loss    Essential (primary) hypertension    Cardiopulmonary arrest with successful resuscitation (HCC)    Ventricular fibrillation (HCC)      [[[[[[[[[[[[[[[[[[[[[[[[[    Impression  ==============  -Shortness of breath     - Positive blood cultures for Streptococcus 2 out of 2 sets.  Rule out tunneled catheter infection.     -Acute on chronic fluid overload.      -status post CABG 2005. status post stent to LAD 2009    Status post stent to LAD 11/13/2015  Status post stent to mid LAD and SVG to marginal branch 09/16/2016.  Status post stent to mid LAD 5/24/2021  Status post stent to SVG to RCA 5/26/2021  Status post stent to proximal LAD 5/13/2022     Cardiac catheterization 5/13/2022 revealed  Left ventricle is significantly enlarged with severe and diffuse hypocontractility with ejection fraction of 20%.  Left main coronary artery is normal.  Left anterior descending artery has proximal 90% disease.  Mid segment stents are patent.  Circumflex coronary artery is totally occluded.  Right coronary artery is totally occluded.  SVG to marginal branch is totally and chronically occluded.  SVG to RCA is patent.  Left subclavian artery is normal.  Left internal mammary artery is not a graft and is normal.    Cardiac catheterization 5/24/2021 revealed  Left ventricle angiogram was not performed due to pre-existing renal dysfunction.  Left main coronary artery normal.  Left anterior descending artery has mid segment lengthy 90% disease within the previously placed stent.  Distal left into  descending artery has diffuse disease.  Circumflex coronary artery is totally occluded.  (Chronic)  Right coronary artery is chronically occluded.  SVG to marginal branch (jump graft to OM1 and OM 2) is totally occluded (new finding compared to 2015.  SVG to PDA has distal radiolucency.  However no definite obstructive disease is present.       -status post myocardial infarction 2000 and 2002 .      -history of intermittent but mostly persistent atrial fibrillation     -Acute on chronic congestive heart failure.  Compensated at this time.     Recent echocardiogram showed left ventricle dysfunction with ejection fraction of 35%.     -History of right-sided weakness with left MCA territory stroke.-Improved    MARSHA 5/13/2022 revealed  Biatrial and biventricular enlargement.  Diffuse left ventricular hypocontractility with ejection fraction of 25 to 30%.  Mild smoking effect in the left atrium.  Small PFO.  Mild mitral aortic and tricuspid regurgitation is present.     Transesophageal echocardiogram 11/30/2020.  Biatrial enlargement.  Smoking effect in the left atrium and left ventricle and left atrial appendage.  Mild mitral and aortic regurgitation.  Left ventricle enlargement with diffuse hypocontractility with ejection fraction of 35 to 40%.     Echocardiogram 11/27/2020 revealed left atrial enlargement left ventricle dysfunction with ejection fraction of 40%.  Negative bubble study.      -diabetes hypertension and sleep apnea.  ESRD.     -status post colon surgery (partial colectomy) appendectomy cholecystectomy right 4th toe removal and carpal tunnel surgery      -continued smoking 1 pack per day -abstinence from smoking      -allergy to codeine.  ============   Plan  ================  Shortness of breath  Positive blood cultures for Streptococcus 2 out of 2 sets.  Rule out tunneled catheter infection..  MARSHA 5/13/2022-negative for vegetations.     Status post CABG  Recent CODE BLUE and ventricular  fibrillation.  Patient had cardiac catheterization and stent placement to proximal LAD 5/13/2022.  Patient is not having any angina pectoris or congestive heart failure     Atrial fibrillation-chronic  Rate is better controlled  Patient is on Coreg at 12.5 mg p.o. twice a day amiodarone and Cardizem.      ESRD  Patient is undergoing dialysis.  Patient had dialysis yesterday    Hypokalemia  k 3.2-supplements.  - Improved at 4.0       Anticoagulation  Patient was on Eliquis 5 mg twice a day.  Observe for toxic effects.  Eliquis is on hold.  Will restart tomorrow.    Ischemic cardiomyopathy.  Recent ventricular fibrillation probably due to severe proximal left anterior descending artery disease.  Likely left ventricular dysfunction we will continue despite having recent stent placement.  Likely patient would benefit from ICD placement (single-chamber).  Patient has narrow QRS complex.  However would like to wait until the infection is cleared up.  Patient may benefit from LifeVest.  I am not sure however patient would comply with wearing LifeVest.  We discussed with infectious disease regarding timing of placement of ICD.    Current medications include amiodarone 200 mg a day aspirin atorvastatin Plavix levothyroxine metoprolol 25 mg twice a day midodrine 15 mg 3 times daily pantoprazole.     Follow-up labs ordered.     Further plan will depend on patient's progress.   ]]]]]]]]]]]]]]]]]]]]]]       Jose G Rm MD  05/14/22  07:09 EDT
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
## 2022-05-15 NOTE — PROGRESS NOTES
[facility] Medicine Services Daily Progress Note    Patient Name: Benson Mclean  : 1950  MRN: [id number]  Primary Care Physician:  Rudolph Rooney MD  Date of admission: 2022    Previous notes and with minor updates.  Subjective    Chief Complaint: Shortness of breath fluid overload.    Patient is a 71 y.o. male with  past medical history of coronary artery disease status post CABG/cardiac stent placement, ESRD on HD [facility], chronic systolic congestive heart failure, chronic atrial fibrillation, CVA with residual right-sided weakness, COPD on chronic oxygen 3L O2 via NC, ANNETTE on Cpap, previous PE,  HTN, HLD, type 2 diabetes mellitus complicated by neuropathy, anemia of renal disease, vitamin deficiencies, and dementia who presented to [facility] ED 2022 from dialysis center. Apparently he was sent to the ED for shortness of breath before completing his dialysis treatment. He had one hour left per nursing report. He also apparently did not have dialysis on Wednesday. The patient complains of shortness of breath and cough, possibly a fever. No increased lower extremity edema. He stated he is bedbound. He is a poor historian and not willing to offer a great deal of information.   Patient was seen in the emergency room and in the ED the patient had CXR that showed small right greater than left pleural effusions.  Bibasilar airspace disease, right greater than left, favored to represent atelectasis.  proBNP greater then 70,000, troponin 0.33, creatinine 2.74, BUN 15, normal potassium, normal WBC, normal lactate, Hgb 11.0.  Blood cultures drawn.  COVID-negative.  Pt on his home rate of 3L O2 via nasal cannula. Nephrology was consulted with plans for hemodialysis.  He was admitted to the hospitalist group for further treatment of acute on chronic respiratory failure secondary to fluid overload, needs dialysis.      2022.  Patient is complaining that  he cannot eat normal food because he is on a mechanical soft diet.  Speech therapy following    5/9/2022.  Patient was seen and examined.  Patient reports having a good night sleep.  Patient is on clear liquid diet now and n.p.o. after midnight.    5/10/2022.  Patient was seen and examined.  Infectious disease consult was completed because of strep bovis bacteremia.  Endoscopy is planned soon.  Eliquis is on hold.    5/12/2022  The patient overall was feeling somewhat better.  He is wanting of fig condon bar.  He otherwise had no requests and no complaints.     5/13/2022  Patient is going for cardioversion and MARSHA cath later today.    5/14/2022  The patient is doing well.  He remains on some oxygen and is overall showing improvement.    5/15/2022  Patient is doing fairly well.  He remains a bit on the confused side but is appropriate.    ROS negative except as above    Objective      Vitals:   Temp:  [96.5 °F (35.8 °C)-98 °F (36.7 °C)] 98 °F (36.7 °C)  Heart Rate:  [56-69] 69  Resp:  [20-22] 20  BP: ()/(47-76) 128/59  Flow (L/min):  [2] 2    Physical Exam  Vitals reviewed.   Constitutional:   Patient appears chronically ill.but appears improved over yesterday.     Comments: Chronically weak appearing   patient is lying comfortably in bed and in no acute distress.  HENT:      Head: Normocephalic.   Cardiovascular:      Rate and Rhythm: Normal rate.  No murmur S3 or S4.  Pulmonary:      Effort: Pulmonary effort is normal.   Abdominal:      General: Abdomen is flat.      Palpations: Abdomen is soft.  There is no tenderness to palpation no palpable organomegaly or masses.  Musculoskeletal:         General: Normal range of motion.      Cervical back: Normal range of motion.   Skin:     General: Skin is warm.   Neurological:      General: No focal deficit present.      Mental Status: He is alert and oriented to person, place, and time.   Psychiatric:         Mood and Affect: Mood normal.         Behavior: Behavior  normal.        Result Review    Result Review:  I have personally reviewed the results from the time of this admission to 5/15/2022 17:10 EDT and agree with these findings:  [x]  Laboratory  [x]  Microbiology  [x]  Radiology  []  EKG/Telemetry   []  Cardiology/Vascular   []  Pathology  []  Old records  []  Other:  Most notable findings include:     Radiology Results (last 21 days)    Procedure Component Value Units Date/Time   CT Chest Without Contrast Diagnostic [[id number]] Vinh as Reviewed   Order Status: Completed Collected: 05/07/22 1740    Updated: 05/07/22 1758   Narrative:     Examination: CT CHEST WO CONTRAST DIAGNOSTIC-       Date of Exam: 5/7/2022 5:14 PM       Indication: Possible pneumonia; N18.6-End stage renal disease;   Z99.2-Dependence on renal dialysis; R06.00-Dyspnea, unspecified;   Z91.15-Patient's noncompliance with renal dialysis; R75-Mwkdmub   effusion, not elsewhere classified.       Comparison: Correlation with chest radiograph 05/06/2022.       Technique: Non-contrast axial volumetric CT imaging of the chest was   performed. Automated exposure control and iterative reconstruction   methods were used.       Findings:   There is moderate to large right and small-to-moderate left pleural   effusion. There is complete atelectasis of the right lower lobe and   atelectasis of approximately 60% of the left lower lobe. There is   partial atelectasis of both upper lobes and the right middle lobe.   Overall, aerated lung occupies approximately 40% of the thoracic cavity.   There is a 7 mm nodule in the subpleural anterior left upper lobe on   axial image 36. There is no pneumothorax. Central airways are patent.   There is a small amount of mucoid debris in the right posterior trachea.       Thyroid, remainder of the trachea and esophagus appear within normal   limits. There is severe native coronary artery calcification. The   patient appears status post median sternotomy and CABG. The heart is    enlarged. There is diminished attenuation of the blood pool suggesting   anemia. The main pulmonary artery is enlarged up to 42 mm suggesting   increased right heart pressure. No pericardial effusion. No pathologic   mediastinal adenopathy.       There is skin thickening and edema in the midline upper back area there   is marked diffuse muscular atrophy. There is a small volume of   perihepatic ascites. There is a left kidney cyst at the inferior pole   measuring 28 mm. There is an exophytic simple right kidney cyst   measuring 24 mm. The patient is status post cholecystectomy. There is   mild bilateral gynecomastia. There is no acute osseous abnormality or   destructive bone lesion. The bones appear demineralized. There are mild   degenerative changes.       Impression:            1. Moderate to large right and small-to-moderate left pleural effusions   with significant passive atelectasis. Aerated lung occupies   approximately 40% of the anatomic lung space.   2. Enlarged main pulmonary artery suggesting increased right heart   pressure.   3. Coronary artery disease status post CABG.   5. Marked atrophy of the musculature diffusely.   6. Gynecomastia.       Electronically Signed By-Rigoberto Hill MD On:5/7/2022 5:45 PM   This report was finalized on [id number] by  Rigoberto Hill MD.          Wounds (last 24 hours)     LDA Wound     Row Name 05/15/22 0817 05/15/22 0000 05/14/22 2000       Wound 05/06/22 1706 Right heel    Wound - Properties Group Placement Date: 05/06/22 -JP Placement Time: 1706 -JP Present on Hospital Admission: Y  -MAGNO Side: Right  - Location: heel  -MAGNO    Dressing Appearance open to air  -SM -- open to air  -KR    Closure Open to air  -SM --  -KR Open to air  -KR    Base -- --  -KR dry;yellow;red  -KR    Drainage Amount none  -SM -- --    Dressing Care open to air  -SM -- --    Retired Wound - Properties Group Placement Date: 05/06/22 -JP Placement Time: 1706 -JP Present on Hospital  Admission: Y  -MAGNO Side: Right  -MAGNO Location: heel  -MAGNO    Retired Wound - Properties Group Date first assessed: 05/06/22  -MAGNO Time first assessed: 1706  -MAGNO Present on Hospital Admission: Y  -MAGNO Side: Right  -MAGNO Location: heel  -MAGNO       Wound 03/30/22 0343 Left heel    Wound - Properties Group Placement Date: 03/30/22  -AS Placement Time: 0343  -AS Present on Hospital Admission: Y  -AS Side: Left  -AS Location: heel  -AS    Dressing Appearance -- -- open to air  -KR    Closure Open to air  -SM --  -KR Open to air  -KR    Base dry;red  -SM --  -KR dry;red;blanchable  -KR    Retired Wound - Properties Group Placement Date: 03/30/22  -AS Placement Time: 0343  -AS Present on Hospital Admission: Y  -AS Side: Left  -AS Location: heel  -AS    Retired Wound - Properties Group Date first assessed: 03/30/22  -AS Time first assessed: 0343  -AS Present on Hospital Admission: Y  -AS Side: Left  -AS Location: heel  -AS       Wound 05/06/22 1710 Right anterior greater trochanter    Wound - Properties Group Placement Date: 05/06/22  -MAGNO Placement Time: 1710  -MAGNO Present on Hospital Admission: Y  -MAGNO Side: Right  -MAGNO Orientation: anterior  -MAGNO Location: greater trochanter  -MAGNO    Dressing Appearance open to air  -SM -- open to air  -KR    Closure Open to air  -SM --  -KR Open to air  -KR    Base white;scab  -SM --  -KR white;scab  -KR    Drainage Amount none  -SM -- --    Care, Wound barrier applied  -SM -- --    Periwound Care barrier ointment applied  -SM -- --    Retired Wound - Properties Group Placement Date: 05/06/22  -MAGNO Placement Time: 1710  -MAGNO Present on Hospital Admission: Y  -MAGNO Side: Right  -MAGNO Orientation: anterior  -MAGNO Location: greater trochanter  -MAGNO    Retired Wound - Properties Group Date first assessed: 05/06/22  -MAGNO Time first assessed: 1710  -MAGNO Present on Hospital Admission: Y  -MAGNO Side: Right  -MAGNO Location: greater trochanter  -MAGNO       Wound 03/30/22 0341 Left posterior thigh    Wound - Properties Group  Placement Date: 03/30/22  -AS Placement Time: 0341  -AS Present on Hospital Admission: Y  -AS Side: Left  -AS Orientation: posterior  -AS Location: thigh  -AS    Pressure Injury Stage 3  -SM -- --    Dressing Appearance open to air  -SM -- open to air  -KR    Closure Open to air  -SM --  -KR Open to air  -KR    Base -- --  -KR white;scab;pink  -KR    Drainage Amount scant;copious  -SM -- --    Retired Wound - Properties Group Placement Date: 03/30/22  -AS Placement Time: 0341  -AS Present on Hospital Admission: Y  -AS Side: Left  -AS Orientation: posterior  -AS Location: thigh  -AS    Retired Wound - Properties Group Date first assessed: 03/30/22  -AS Time first assessed: 0341  -AS Present on Hospital Admission: Y  -AS Side: Left  -AS Location: thigh  -AS       Wound 03/30/22 0340 perineum Pressure Injury    Wound - Properties Group Placement Date: 03/30/22  -AS Placement Time: 0340  -AS Present on Hospital Admission: Y  -AS Location: perineum  -AS Primary Wound Type: Pressure inj  -AS    Dressing Appearance open to air  -SM -- open to air  -KR    Closure OZIEL;Sutures  -SM --  -KR Open to air  -KR    Base -- --  -KR red;moist  -KR    Periwound excoriated  -SM -- --    Drainage Amount none  -SM -- --    Care, Wound barrier applied  -SM -- --    Retired Wound - Properties Group Placement Date: 03/30/22  -AS Placement Time: 0340  -AS Present on Hospital Admission: Y  -AS Location: perineum  -AS Primary Wound Type: Pressure inj  -AS    Retired Wound - Properties Group Date first assessed: 03/30/22  -AS Time first assessed: 0340  -AS Present on Hospital Admission: Y  -AS Location: perineum  -AS Primary Wound Type: Pressure inj  -AS       Wound 05/11/22 0230 Right posterior hand Skin Tear    Wound - Properties Group Placement Date: 05/11/22  - Placement Time: 0230  - Side: Right  - Orientation: posterior  - Location: hand  - Primary Wound Type: Skin tear  -    Dressing Appearance dry;moist drainage  -SM --  dry;intact  -KR    Closure OZIEL  -SM --  -KR OZIEL  -KR    Base dressing in place, unable to visualize  -SM --  -KR dressing in place, unable to visualize  -KR    Periwound ecchymotic  -SM -- --    Retired Wound - Properties Group Placement Date: 05/11/22  - Placement Time: 0230 - Side: Right  - Orientation: posterior  - Location: hand  - Primary Wound Type: Skin tear  -    Retired Wound - Properties Group Date first assessed: 05/11/22  - Time first assessed: 0230  - Side: Right  - Location: hand  - Primary Wound Type: Skin tear  -          User Key  (r) = Recorded By, (t) = Taken By, (c) = Cosigned By    Initials Name Provider Type    Jessica Isaac, RN Registered Nurse    Zainab Griffiths RN Registered Nurse    Darleen Piper RN Registered Nurse    Judy Cueva RN Registered Nurse    Maricruz Jiménez RN Registered Nurse                     Assessment/Plan    71-year-old gentleman with fluid overload and bacteremia     Active Hospital Problems:          Active Hospital Problems     Diagnosis     • **Fluid overload     • Chronic respiratory failure with hypoxia, on home oxygen therapy ([facility])     • Elevated troponin     • Hypothyroidism (acquired)     • End stage renal disease ([facility])     • Anemia of renal disease     • Mixed hyperlipidemia     • Major depressive disorder, recurrent, mild ([facility])     • Flaccid hemiplegia of right dominant side as late effect of cerebral infarction ([facility])     • COPD with acute exacerbation ([facility])     • S/P CABG (coronary artery bypass graft)     • Essential hypertension     • ANNETTE treated with BiPAP        Plan:      Shortness of breath secondary to fluid overload  Chronic respiratory failure with oxygen dependence    -Improving.  -CXR: small right greater than left pleural effusions.  Bibasilar airspace disease, right greater than left, favored to represent atelectasis.   -proBNP >70,000 (previous proBNP march 2022 was also >70,000)       -will recheck  in the a.m.  -pt on his home rate of 3L O2 via NC  -pt did not complete dialysis  -nephrology consulted appreciate assistance     Strep bovis bacteremia.  ID is following.  IV antibiotics ordered  Pending culture of dialysis catheter  Pending final identification  Echo pending  Appreciate ID assistance    Chronically elevated troponin  -troponin 0.33 compared to troponin 0.146 on 3/12/2022.   -Will trend      ESRD on HD MWF  -on midodrine for BP support  -nephrology to manage     CVA with right sided residual weakness  -on eliquis, statin       -restart Elliquis in the am.    CAD s/p CABG, PCI  Chronic atrial fibrillation  Hyperlipidemia  -chronic, controlled.   -continue Eliquis, atorvastatin, and metoprolol  -For MARSHA cardiac cath for ablation treatment today    DM type 2  -hemoglobin A1c 6.3 on 3/12/2022 and previous admission basal insulin stopped due to hypoglycemia  -SSI AC/HS     Hypothyroidism  -Continue home synthroid     Major depressive disorder, recurrent, mild   Dementia  -Continue home zoloft, aricept     ANNETTE  -cpap qhs      Obesity (BMI 30-39.9)  BMI 36.92  -Lifestyle modifications to reduce cardiovascular risk factors     Dysphagia  Patient complaining about his current diet.  Speech following.     DVT prophylaxis: eliquis      CODE STATUS:    Admission Status:  I believe this patient meets observation status.     I discussed the patient's findings and my recommendations with patient.     This patient has been examined wearing appropriate Personal Protective Equipment.    05/15/22      Electronically signed by Kasandra Ocampo MD, FACP, 05/15/22, 17:10 EDT.      [facility] Hospitalist Team

## 2022-05-15 NOTE — PLAN OF CARE
Goal Outcome Evaluation:              Outcome Evaluation: Pt BS low this am and afternoon.  tx with apple rené and PB crackers. No insulin given.  Pt will be getting dialysis tomorrow per orders.  will cont to monitor.

## 2022-05-15 NOTE — PROGRESS NOTES
Daily Progress Note        Chronic systolic (congestive) heart failure (HCC)    S/P CABG (coronary artery bypass graft)    Primary hypertension    Elevated troponin    ANNETTE treated with BiPAP    Major depressive disorder, recurrent, mild (HCC)    Mixed hyperlipidemia    Flaccid hemiplegia affecting right dominant side ([facility])    Diabetic neuropathy ([facility])    Chronic obstructive pulmonary disease with (acute) exacerbation ([facility])    Anemia of renal disease    End stage renal disease ([facility])    Chronic respiratory failure with hypoxia, on home oxygen therapy ([facility])    Other specified hypothyroidism    Fluid overload    Stage 5 chronic kidney disease on chronic dialysis ([facility])    Bacteremia    Normocytic anemia due to blood loss    Essential (primary) hypertension    Cardiopulmonary arrest with successful resuscitation ([facility])    Ventricular fibrillation (formerly [facility])      Assessment    S/p Code blue was in V-fib. was defibrillated x1.  Patient was given an epi x1, bicarb x2. Upon pulse checks patient was found to be with a pulse and in normal sinus with a left bundle. Patient was moaning and turning his head. He became more and more alert.  Patient did not require intubation.       He continued to oxygenate well with nasal cannula. Within about 5 minutes the patient was asking to sit up and asking for water     Bacteremia with Streptococcus gallolyticus previously called Streptococcus bovis could be associated with GI malignancy  5/7/2020 repeat blood cultures negative    5/13/2022 MARSHA to rule out endocarditis due to Streptococcus Gallolyticus  bacteria  Biatrial and biventricular enlargement.  Diffuse left ventricular hypocontractility with ejection fraction of 25 to 30%.  Mild smoking effect in the left atrium.  Small PFO.  Mild mitral aortic and tricuspid regurgitation is present.  No vegetations       ESRD on HD [place],   chronic systolic congestive heart failure,   CAD s/p CABG, cardiac stent,   chronic atrial  fibrillation,   CVA with residual right-sided weakness,   COPD on chronic oxygen 3L O2 via NC,   ANNETTE on Cpap,   previous PE,    HTN,   HLD,   type 2 diabetes mellitus complicated by neuropathy,   anemia of renal disease,   vitamin deficiencies,   dementia         Recommendations:     Antibiotics currently on Rocephin     Hemodialysis     BiPAP 12/5 to be used at night and as needed during the day    BP control, midodrine  Amiodarone PO  GI prophylaxis Protonix  Synthroid    Colonoscopy completed, may need CT abdomen             LOS: 9 days     Subjective         Objective     Vital signs for last 24 hours:  Vitals:    05/15/22 1109 05/15/22 1116 05/15/22 1405 05/15/22 1500   BP:   128/59    Pulse: 68 69  69   Resp: 22 22 20 20   Temp:   98 °F (36.7 °C)    TempSrc:       SpO2: 100% 100%  100%   Weight:       Height:           Intake/Output last 3 shifts:  I/O last 3 completed shifts:  In: 100 [IV Piggyback:100]  Out: 1000 [Other:1000]  Intake/Output this shift:  I/O this shift:  In: 180 [P.O.:180]  Out: -       Radiology  Imaging Results (Last 24 Hours)     Procedure Component Value Units Date/Time    XR Chest 1 View [[id number]] Collected: 05/15/22 0111     Updated: 05/15/22 0114    Narrative:      Exam:  Single view chest    Date: May 15, 2022    History: Shortness of breath    Comparison: May 11 2022    Technique: Single frontal radiograph of the chest was obtained    Findings:    The heart is enlarged. There is vascular congestion and interstitial edema. There are at least moderately sized bilateral pleural effusions. There are prior cardiothoracic surgical changes. There is a large bore catheter in right chest. There is no   pneumothorax.      Impression:      1. Persistent cardiomegaly with vascular congestion, interstitial edema and bilateral pleural effusions. The appearance is similar to the prior study.    Slot 63      Electronically signed by:  Oleg Villatoro M.D.    5/14/2022 11:13 PM           Labs:  Results from last 7 days   Lab Units 05/15/22  0344   WBC 10*3/mm3 5.40   HEMOGLOBIN g/dL 10.2*   HEMATOCRIT % 33.0*   PLATELETS 10*3/mm3 188     Results from last 7 days   Lab Units 05/15/22  0344 05/14/22  0837   SODIUM mmol/L 135* 136   POTASSIUM mmol/L 4.6 4.7   CHLORIDE mmol/L 99 100   CO2 mmol/L 24.0 25.0   BUN mg/dL 13 10   CREATININE mg/dL 2.68* 2.09*   CALCIUM mg/dL 8.6 8.6   BILIRUBIN mg/dL  --  0.4   ALK PHOS U/L  --  57   ALT (SGPT) U/L  --  7   AST (SGOT) U/L  --  20   GLUCOSE mg/dL 116* 137*     Results from last 7 days   Lab Units 05/11/22  0253   PH, ARTERIAL pH units 7.362   PO2 ART mm Hg 100.1   PCO2, ARTERIAL mm Hg 47.0   HCO3 ART mmol/L 26.7     Results from last 7 days   Lab Units 05/15/22  0344 05/14/22  0837 05/13/22  0408   ALBUMIN g/dL 3.10* 3.10* 2.90*     Results from last 7 days   Lab Units 05/13/22  0408   TROPONIN T ng/mL 0.583*         Results from last 7 days   Lab Units 05/14/22  0837   MAGNESIUM mg/dL 2.0     Results from last 7 days   Lab Units 05/13/22  0408 05/10/22  0706   INR  1.12* 1.09               Meds:   SCHEDULE  amiodarone, 200 mg, Oral, Q24H  apixaban, 5 mg, Oral, Q12H  aspirin, 81 mg, Oral, Daily  atorvastatin, 40 mg, Oral, Nightly  budesonide, 0.5 mg, Nebulization, BID  cefTRIAXone, 2 g, Intravenous, Q24H  clopidogrel, 75 mg, Oral, Daily  donepezil, 10 mg, Oral, Nightly  insulin lispro, 0-7 Units, Subcutaneous, TID AC  ipratropium-albuterol, 3 mL, Nebulization, TID - RT  levothyroxine, 50 mcg, Oral, Q AM  lidocaine, 20 mL, Intradermal, Once  metoprolol tartrate, 25 mg, Oral, BID  midodrine, 15 mg, Oral, TID AC  pantoprazole, 40 mg, Oral, QAM  polyethylene glycol, 17 g, Oral, BID  sertraline, 50 mg, Oral, Daily  sodium chloride, 10 mL, Intravenous, Q12H  sodium chloride, 10 mL, Intravenous, Q12H  sorbitol, 50 mL, Oral, Daily      Infusions     PRNs  •  acetaminophen **OR** acetaminophen **OR** acetaminophen  •  acetaminophen  •  aluminum-magnesium  hydroxide-simethicone  •  atropine  •  dextrose  •  dextrose  •  glucagon (human recombinant)  •  insulin lispro **AND** insulin lispro  •  ipratropium-albuterol  •  melatonin  •  Morphine  •  ondansetron **OR** ondansetron  •  oxyCODONE  •  sodium chloride  •  sodium chloride  •  sodium chloride  •  sodium chloride    Physical Exam:  Physical Exam  Vitals reviewed.   Pulmonary:      Breath sounds: Rales present.   Musculoskeletal:         General: Swelling present.   Skin:     General: Skin is warm and dry.   Neurological:      Mental Status: He is alert.         ROS  Review of Systems   Respiratory: Positive for cough and shortness of breath.    Neurological: Positive for weakness.       I reviewed the patient's new clinical results    Electronically signed by FABIOLA Bui

## 2022-05-15 NOTE — PROGRESS NOTES
Infectious Diseases Progress Note      LOS: 9 days   Patient Care Team:  Rudolph Rooney MD as PCP - General (Family Medicine)  Samantha Zabala APRN as PCP - Family Medicine  Jose G Rm MD as Consulting Physician (Cardiology)    Chief Complaint: Shortness of breath    Subjective       The patient has been afebrile for the last 24 hours.  The patient is hemodynamically stable requiring no vasopressors.  He is currently on 2 L of oxygen via nasal cannula      Review of Systems:   Review of Systems   Constitutional: Positive for fatigue.   HENT: Negative.    Eyes: Negative.    Respiratory: Positive for shortness of breath.    Cardiovascular: Negative.    Gastrointestinal: Negative.    Endocrine: Negative.    Genitourinary: Negative.    Musculoskeletal: Negative.    Skin: Negative.    Neurological: Positive for weakness.   Psychiatric/Behavioral: Negative.    All other systems reviewed and are negative.       Objective     Vital Signs  Temp:  [96.5 °F (35.8 °C)-98 °F (36.7 °C)] 97 °F (36.1 °C)  Heart Rate:  [56-69] 69  Resp:  [20-22] 22  BP: (100-135)/(49-76) 110/56    Physical Exam:  Physical Exam  Vitals and nursing note reviewed.   Constitutional:       General: He is not in acute distress.     Appearance: Normal appearance. He is well-developed and normal weight. He is ill-appearing. He is not diaphoretic.   HENT:      Head: Normocephalic and atraumatic.   Eyes:      Conjunctiva/sclera: Conjunctivae normal.      Pupils: Pupils are equal, round, and reactive to light.   Cardiovascular:      Rate and Rhythm: Normal rate and regular rhythm.      Heart sounds: Normal heart sounds, S1 normal and S2 normal.   Pulmonary:      Effort: Pulmonary effort is normal. No respiratory distress.      Breath sounds: No stridor. Rales present. No wheezing.   Abdominal:      General: Bowel sounds are normal. There is no distension.      Palpations: Abdomen is soft. There is no mass.      Tenderness: There is no abdominal  tenderness. There is no guarding.   Musculoskeletal:         General: No deformity. Normal range of motion.      Cervical back: Neck supple.   Skin:     General: Skin is warm and dry.      Coloration: Skin is not pale.      Findings: No erythema or rash.   Neurological:      Mental Status: He is alert and oriented to person, place, and time.      Cranial Nerves: No cranial nerve deficit.      Comments: Generalized weakness   Psychiatric:         Mood and Affect: Mood normal.          Results Review:    I have reviewed all clinical data, test, lab, and imaging results.     Radiology  XR Chest 1 View    Result Date: 5/15/2022  Exam:  Single view chest Date: May 15, 2022 History: Shortness of breath Comparison: May 11 2022 Technique: Single frontal radiograph of the chest was obtained Findings: The heart is enlarged. There is vascular congestion and interstitial edema. There are at least moderately sized bilateral pleural effusions. There are prior cardiothoracic surgical changes. There is a large bore catheter in right chest. There is no pneumothorax.     1. Persistent cardiomegaly with vascular congestion, interstitial edema and bilateral pleural effusions. The appearance is similar to the prior study. Slot 63 Electronically signed by:  Oleg Villatoro M.D.  5/14/2022 11:13 PM      Cardiology    Laboratory    Results from last 7 days   Lab Units 05/15/22  0344 05/14/22  0837 05/13/22  0408 05/12/22  0501 05/11/22  0555 05/11/22  0253 05/10/22  0706 05/09/22  0528   WBC 10*3/mm3 5.40 4.80 6.70 7.20 11.20*  --  6.70 6.90   HEMOGLOBIN g/dL 10.2* 10.1* 10.4* 10.3* 10.5*  --  11.9* 10.8*   HEMOGLOBIN, POC g/dL  --   --   --   --   --  13.4  --   --    HEMATOCRIT % 33.0* 33.9* 34.3* 32.7* 34.9*  --  39.4 34.6*   HEMATOCRIT POC %  --   --   --   --   --  39  --   --    PLATELETS 10*3/mm3 188 185 211 208 234  --  213 200     Results from last 7 days   Lab Units 05/15/22  0344 05/14/22  0837 05/13/22  0408 05/12/22  0504  05/11/22  0555 05/10/22  0706 05/09/22  0228   SODIUM mmol/L 135* 136 133* 133* 133* 135* 130*   POTASSIUM mmol/L 4.6 4.7 4.4 4.2 3.2* 4.1 4.0   CHLORIDE mmol/L 99 100 98 100 97* 97* 93*   CO2 mmol/L 24.0 25.0 25.0 23.0 24.0 24.0 24.0   BUN mg/dL 13 10 16 12 21 17 24*   CREATININE mg/dL 2.68* 2.09* 2.90* 2.48* 3.41* 2.86* 3.39*   GLUCOSE mg/dL 116* 137* 97 122* 167* 132* 133*   ALBUMIN g/dL 3.10* 3.10* 2.90* 3.00* 2.80* 2.90*  --    BILIRUBIN mg/dL  --  0.4 0.3 0.3 0.3 0.4  --    ALK PHOS U/L  --  57 49 45 46 56  --    AST (SGOT) U/L  --  20 11 12 10 12  --    ALT (SGPT) U/L  --  7 6 7 8 6  --    CALCIUM mg/dL 8.6 8.6 8.2* 8.1* 8.0* 8.7 7.9*                 Microbiology   Microbiology Results (last 10 days)     Procedure Component Value - Date/Time    MRSA Screen, PCR (Inpatient) - Swab, Nares [[id number]]  (Normal) Collected: 05/11/22 0557    Lab Status: Final result Specimen: Swab from Nares Updated: 05/11/22 0804     MRSA PCR No MRSA Detected    COVID PRE-OP / PRE-PROCEDURE SCREENING ORDER (NO ISOLATION) - Swab, Nasopharynx [[id number]]  (Normal) Collected: 05/10/22 0635    Lab Status: Final result Specimen: Swab from Nasopharynx Updated: 05/10/22 1143    Narrative:      The following orders were created for panel order COVID PRE-OP / PRE-PROCEDURE SCREENING ORDER (NO ISOLATION) - Swab, Nasopharynx.  Procedure                               Abnormality         Status                     ---------                               -----------         ------                     COVID-19,CEPHEID/ROHAN,CO...[[id number]]  Normal              Final result                 Please view results for these tests on the individual orders.    COVID-19,CEPHEID/ROHAN,COR/ZEYNEP/PAD/BEATRICE IN-HOUSE(OR EMERGENT/ADD-ON),NP SWAB IN TRANSPORT MEDIA 3-4 HR TAT, RT-PCR - Swab, Nasopharynx [[id number]]  (Normal) Collected: 05/10/22 0635    Lab Status: Final result Specimen: Swab from Nasopharynx Updated: 05/10/22 1143     COVID19 Not Detected     Narrative:      Fact sheet for providers: https://www.fda.gov/media/127416/download     Fact sheet for patients: https://www.fda.gov/media/232648/download  Fact sheet for providers: https://www.fda.gov/media/293170/download     Fact sheet for patients: https://www.fda.gov/media/152427/download    Blood Culture - Blood, Hand, Right [[id number]]  (Normal) Collected: 05/07/22 1519    Lab Status: Final result Specimen: Blood from Hand, Right Updated: 05/12/22 1617     Blood Culture No growth at 5 days    CANDIDA AURIS SCREEN - Swab, Axilla Right, Axilla Left and Groin [[id number]]  (Normal) Collected: 05/07/22 1020    Lab Status: Final result Specimen: Swab from Axilla Right, Axilla Left and Groin Updated: 05/12/22 0739     Candida Auris Screen Culture No Candida auris isolated at 5 days    COVID PRE-OP / PRE-PROCEDURE SCREENING ORDER (NO ISOLATION) - Swab, Nasopharynx [[id number]]  (Normal) Collected: 05/06/22 1519    Lab Status: Final result Specimen: Swab from Nasopharynx Updated: 05/06/22 1542    Narrative:      The following orders were created for panel order COVID PRE-OP / PRE-PROCEDURE SCREENING ORDER (NO ISOLATION) - Swab, Nasopharynx.  Procedure                               Abnormality         Status                     ---------                               -----------         ------                     COVID-19,CEPHEID/ROHAN,CO...[[id number]]  Normal              Final result                 Please view results for these tests on the individual orders.    COVID-19,CEPHEID/ROHAN,COR/ZEYNEP/PAD/BEATRICE IN-HOUSE(OR EMERGENT/ADD-ON),NP SWAB IN TRANSPORT MEDIA 3-4 HR TAT, RT-PCR - Swab, Nasopharynx [[id number]]  (Normal) Collected: 05/06/22 1519    Lab Status: Final result Specimen: Swab from Nasopharynx Updated: 05/06/22 1542     COVID19 Not Detected    Narrative:      Fact sheet for providers: https://www.fda.gov/media/590634/download     Fact sheet for patients: https://www.fda.gov/media/835785/download  Fact sheet for  providers: https://www.fda.gov/media/056241/download    Fact sheet for patients: https://www.fda.gov/media/549486/download    Test performed by PCR.    Blood Culture - Blood, Hand, Right [[id number]]  (Abnormal)  (Susceptibility) Collected: 05/06/22 1116    Lab Status: Final result Specimen: Blood from Hand, Right Updated: 05/09/22 0703     Blood Culture Streptococcus gallolyticus ssp pasteurianus     Isolated from Aerobic and Anaerobic Bottles     Gram Stain Anaerobic Bottle Gram positive cocci      Aerobic Bottle Gram positive cocci    Susceptibility      Streptococcus gallolyticus ssp pasteurianus      MONROE      Ceftriaxone Susceptible      Penicillin G Susceptible      Vancomycin Susceptible                           Blood Culture ID, PCR - Blood, Hand, Right [[id number]]  (Abnormal) Collected: 05/06/22 1116    Lab Status: Final result Specimen: Blood from Hand, Right Updated: 05/07/22 0216     BCID, PCR Streptococcus spp, not A, B, or pneumonia. Identification by BCID2 PCR.     BOTTLE TYPE Anaerobic Bottle    Blood Culture - Blood, Arm, Right [[id number]]  (Abnormal) Collected: 05/06/22 1057    Lab Status: Final result Specimen: Blood from Arm, Right Updated: 05/09/22 0704     Blood Culture Streptococcus gallolyticus ssp pasteurianus     Isolated from Aerobic and Anaerobic Bottles     Gram Stain Anaerobic Bottle Gram positive cocci      Aerobic Bottle Gram positive cocci    Narrative:      Refer to previous blood culture collected on 5/6/2022 1116 for MICs.            Medication Review:       Schedule Meds  amiodarone, 200 mg, Oral, Q24H  apixaban, 5 mg, Oral, Q12H  aspirin, 81 mg, Oral, Daily  atorvastatin, 40 mg, Oral, Nightly  budesonide, 0.5 mg, Nebulization, BID  cefTRIAXone, 2 g, Intravenous, Q24H  clopidogrel, 75 mg, Oral, Daily  donepezil, 10 mg, Oral, Nightly  insulin lispro, 0-7 Units, Subcutaneous, TID AC  ipratropium-albuterol, 3 mL, Nebulization, TID - RT  levothyroxine, 50 mcg, Oral, Q AM  lidocaine,  20 mL, Intradermal, Once  metoprolol tartrate, 25 mg, Oral, BID  midodrine, 15 mg, Oral, TID AC  pantoprazole, 40 mg, Oral, QAM  polyethylene glycol, 17 g, Oral, BID  sertraline, 50 mg, Oral, Daily  sodium chloride, 10 mL, Intravenous, Q12H  sodium chloride, 10 mL, Intravenous, Q12H  sorbitol, 50 mL, Oral, Daily        Infusion Meds       PRN Meds  •  acetaminophen **OR** acetaminophen **OR** acetaminophen  •  acetaminophen  •  aluminum-magnesium hydroxide-simethicone  •  atropine  •  dextrose  •  dextrose  •  glucagon (human recombinant)  •  insulin lispro **AND** insulin lispro  •  ipratropium-albuterol  •  melatonin  •  Morphine  •  ondansetron **OR** ondansetron  •  oxyCODONE  •  sodium chloride  •  sodium chloride  •  sodium chloride  •  sodium chloride        Assessment/Plan       Antimicrobial Therapy   1.  IV ceftriaxone        2.        3.        4.        5.            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  We need to rule out endocarditis or GI malignancy  Colonoscopy was done on May 10, 2022 and found to have polyps and biopsies are pending  -MARSHA was negative for vegetation  -Blood culture from 5/7/2022 is negative     The patient presented hospital with shortness of breath and chest x-ray showed density at the right base.  Need to rule out pneumonia  -CT showed some large bilateral pleural effusions but no obvious pneumonia which most likely secondary to volume overload  -Patient is currently on 2 L of oxygen by cannula     End-stage renal disease on hemodialysis.  Patient had right chest tunneled dialysis catheter and left arm AV fistula     History of CVA    S/p cardiac arrest with V. fib rhythm resuscitated successfully on May 11, 2022  -Patient had a heart catheterization with stent to the LAD 5/13/2020 we have reviewed the ryan's labs, notes, and imagining studies for today. We are keeping the same antimicrobial therapy at  this time.     Plan     Continue ceftriaxone 2 g IV daily for 2 weeks from the last negative blood culture on 5/7/2022-last day on 5/20/2022  Cardiology is considering placing a ICD-can be done anytime next week  Supportive care  Crescencio Jack MD  05/15/22  12:07 EDT    Note is dictated utilizing voice recognition software/Dragon

## 2022-05-15 NOTE — NURSING NOTE
Upon shift assessment, patient is more lethargic than previously and only arousing to loud voice.  RN had to ask patient 3x who the president was before patient would answer.  Patient was asked many questions after and only would repeat that Biden is the president.   On call provider, Dr. Ruby was notified via answering service.  See new provider orders.    RT also placed patient on bipap but has been pulling it off.  Medsitter placed in room.

## 2022-05-15 NOTE — PROGRESS NOTES
Referring Provider: Kasandra Ocampo MD    Reason for follow-up:  Shortness of breath  Recent ventricular fibrillation.  Bacteremia  Status post stent       Patient Care Team:  Rudolph Rooney MD as PCP - General (Family Medicine)  Samantha Zabala APRN as PCP - Family Medicine  Jose G Rm MD as Consulting Physician (Cardiology)    Subjective .  Feeling better  ROS  Mild confusion.  Since I have last seen him yesterday, the patient has been without any chest discomfort ,shortness of breath, palpitations, dizziness or syncope.  Denies having any headache ,abdominal pain ,nausea, vomiting , diarrhea constipation, loss of weight or loss of appetite.  Denies having any excessive bruising ,hematuria or blood in the stool.    Review of all systems negative except as indicated    History  Past Medical History:   Diagnosis Date   • A-fib (Formerly [facility]) 8/3/2021   • Anemia    • Appetite loss    • Arthritis    • Brain bleed (Formerly [facility])    • Carpal tunnel syndrome on left    • CHF (congestive heart failure) (Formerly [facility])    • Chronic left-sided low back pain without sciatica 12/27/2017   • CKD (chronic kidney disease) stage 3, GFR 30-59 ml/min (Formerly [facility])    • Closed fracture of seventh thoracic vertebra (Formerly [facility]) 8/23/2020   • Cognitive communication deficit 12/1/2020   • COPD (chronic obstructive pulmonary disease) (Formerly [facility])    • Coronary artery disease    • COVID-19 2/19/2021   • Cytokine release syndrome, grade 1 5/24/2021   • Depression    • Diabetic neuropathy (Formerly [facility])    • Dialysis patient (Formerly [facility])     mon wed fri   • DM2 (diabetes mellitus, type 2) (Formerly [facility])    • GERD (gastroesophageal reflux disease)    • Hyperlipidemia    • Hypertension    • Hypogonadism in male    • Neuropathy    • Nonsustained ventricular tachycardia (Formerly [facility]) 7/23/2021   • Obesity    • Paroxysmal atrial fibrillation (Formerly [facility]) 12/1/2020   • Sleep apnea    • Stroke (Formerly [facility])    • Unsteady gait        Past Surgical History:   Procedure Laterality Date   • ANGIOPLASTY      X2   • APPENDECTOMY     •  ARTERIOVENOUS FISTULA/SHUNT SURGERY Left 1/20/2022    Procedure: LEFT BRACHIAL CEPHALIC ARTERIAL VENOUS FISTULA CREATION;  Surgeon: Yony Davila MD;  Location: [facility] MAIN OR;  Service: Vascular;  Laterality: Left;   • CARDIAC CATHETERIZATION  11/13/2015   • CARDIAC CATHETERIZATION N/A 5/24/2021    Procedure: Left Heart Cath and coronary angiogram;  Surgeon: Jose G Rm MD;  Location:  [facility] CATH INVASIVE LOCATION;  Service: Cardiovascular;  Laterality: N/A;   • CARDIAC CATHETERIZATION  5/24/2021    Procedure: Saphenous Vein Graft;  Surgeon: Jose G Rm MD;  Location:  [facility] CATH INVASIVE LOCATION;  Service: Cardiovascular;;   • CARDIAC CATHETERIZATION N/A 5/24/2021    Procedure: Percutaneous Coronary Intervention;  Surgeon: Bernabe Zambrano MD;  Location:  [facility] CATH INVASIVE [facility];  Service: Cardiology;  Laterality: N/A;   • CARDIAC CATHETERIZATION N/A 5/24/2021    Procedure: Stent NIELS coronary;  Surgeon: Bernabe Zambrano MD;  Location:  [facility] C[facility] INVASIVE [facility];  Service: Cardiology;  Laterality: N/A;   • CARDIAC CATHETERIZATION N/A 5/26/2021    Procedure: Percutaneous Coronary Intervention;  Surgeon: Bernaeb Zambrano MD;  Location:  [facility] C[facility] INVASIVE [facility];  Service: Cardiovascular;  Laterality: N/A;   • CARDIAC CATHETERIZATION N/A 5/26/2021    Procedure: Stent NIELS bypass graft;  Surgeon: Bernabe Zambrano MD;  Location:  [facility]VASIVE [facility];  Service: Cardiovascular;  Laterality: N/A;   • CARDIAC ELECTROPHYSIOLOGY PROCEDURE N/A 5/24/2021    Procedure: Temporary Pacemaker;  Surgeon: Bernabe Zambrano MD;  Location:  [facility] C[facility] INVASIVE [facility];  Service: Cardiology;  Laterality: N/A;   • CARDIAC ELECTROPHYSIOLOGY PROCEDURE N/A 5/26/2021    Procedure: Temporary Pacemaker;  Surgeon: Bernabe Zambrano MD;  Location: [facility] CATH INVASIVE LOCATION;  Service: Cardiovascular;  Laterality: N/A;   • CARPAL TUNNEL RELEASE Left 04/29/2018    carpal tunnel- lt hand// other hand surgeries    •  CATARACT EXTRACTION, BILATERAL  2002    Dr. Lux Acosta   • CHOLECYSTECTOMY     • COLON RESECTION  2005   • COLONOSCOPY N/A 5/10/2022    Procedure: COLONOSCOPY with polypectomy x3;  Surgeon: Herbie Keane MD;  Location: [facility] ENDOSCOPY;  Service: Gastroenterology;  Laterality: N/A;  colon polyps;internal hemorrhoids   • CORONARY ANGIOPLASTY     • CORONARY ANGIOPLASTY WITH STENT PLACEMENT  11/13/2015    PTCA stent to proximal in stent and mid to distal lad   • CORONARY ANGIOPLASTY WITH STENT PLACEMENT  09/16/2016    PTCA stent to mid lad and stent to vein graft to marginal   • CORONARY ARTERY BYPASS GRAFT  2005    @ [facility]   • CYST REMOVAL      cyst removed from scrotum   • FOOT SURGERY Right 07/17/2018   • FOOT SURGERY Left 02/04/2019   • PORTACATH PLACEMENT     • TOE AMPUTATION Right     right toe removed D/T infected cut that went to the bone       Family History   Problem Relation Age of Onset   • Heart disease Mother    • Heart disease Father    • Diabetes Sister    • Heart disease Sister    • Diabetes Brother    • Mental illness Brother        Social History     Tobacco Use   • Smoking status: Former Smoker     Packs/day: 1.00     Years: 60.00     Pack years: 60.00     Types: Cigarettes   • Smokeless tobacco: Never Used   • Tobacco comment: Patient quit smoking 11/2020   Vaping Use   • Vaping Use: Never used   Substance Use Topics   • Alcohol use: No   • Drug use: Yes     Frequency: 1.0 times per week     Types: Marijuana     Comment: socially        Medications Prior to Admission   Medication Sig Dispense Refill Last Dose   • albuterol sulfate  (90 Base) MCG/ACT inhaler Inhale 2 puffs Every 4 (Four) Hours.   5/6/2022 at Unknown time   • apixaban (ELIQUIS) 5 MG tablet tablet Take 1 tablet by mouth Every 12 (Twelve) Hours. Indications: Atrial Fibrillation 60 tablet  5/6/2022 at Unknown time   • atorvastatin (LIPITOR) 40 MG tablet Take 40 mg by mouth Every Night.   5/5/2022 at Unknown time   • budesonide  (Pulmicort) 0.5 MG/2ML nebulizer solution Take 2 mL by nebulization 2 (Two) Times a Day.   5/6/2022 at Unknown time   • cholecalciferol (VITAMIN D3) 25 MCG (1000 UT) tablet Take 1,000 Units by mouth Daily.   5/6/2022 at Unknown time   • docusate sodium (COLACE) 100 MG capsule Take 100 mg by mouth Daily.   5/6/2022 at Unknown time   • donepezil (ARICEPT) 10 MG tablet Take 10 mg by mouth Every Night.   5/5/2022 at Unknown time   • fluticasone (FLONASE) 50 MCG/ACT nasal spray 2 sprays by Each Nare route 2 (Two) Times a Day.   5/6/2022 at Unknown time   • guaiFENesin (MUCINEX) 600 MG 12 hr tablet Take 600 mg by mouth 2 (Two) Times a Day.   5/6/2022 at Unknown time   • HYDROcodone-acetaminophen (NORCO) 5-325 MG per tablet Take 1 tablet by mouth 3 (Three) Times a Day.   5/6/2022 at Unknown time   • ipratropium-albuterol (DUO-NEB) 0.5-2.5 mg/3 ml nebulizer Take 3 mL by nebulization 3 (Three) Times a Day. 360 mL  5/6/2022 at Unknown time   • ipratropium-albuterol (DUO-NEB) 0.5-2.5 mg/3 ml nebulizer Take 3 mL by nebulization Every 4 (Four) Hours As Needed for Wheezing. 360 mL  5/6/2022 at Unknown time   • levothyroxine (SYNTHROID, LEVOTHROID) 50 MCG tablet Take 50 mcg by mouth Every Morning.   5/6/2022 at Unknown time   • Metoprolol Tartrate 37.5 MG tablet Take 37.5 mg by mouth 2 (Two) Times a Day. Hold for SBP <110 or HR < 60   5/6/2022 at Unknown time   • midodrine (PROAMATINE) 10 MG tablet Take 10 mg by mouth 3 (Three) Times a Day Before Meals. Hold for BP > 140/90   5/6/2022 at Unknown time   • omeprazole (priLOSEC) 20 MG capsule Take 20 mg by mouth Daily.   5/6/2022 at Unknown time   • polyethylene glycol (MiraLax) 17 g packet Take 17 g by mouth 2 (Two) Times a Day.   5/6/2022 at Unknown time   • sertraline (ZOLOFT) 50 MG tablet Take 50 mg by mouth Daily.   5/6/2022 at Unknown time   • vitamin B-12 (CYANOCOBALAMIN) 1000 MCG tablet Take 1,000 mcg by mouth Daily.   5/6/2022 at Unknown time   • bisacodyl (DULCOLAX) 10 MG  suppository Insert 10 mg into the rectum Daily As Needed for Constipation.   Unknown at Unknown time   • magnesium hydroxide (MILK OF MAGNESIA) 400 MG/5ML suspension Take 30 mL by mouth Daily As Needed for Constipation.   Unknown at Unknown time   • ondansetron (ZOFRAN) 4 MG tablet Take 4 mg by mouth Every 8 (Eight) Hours As Needed for Nausea or Vomiting.   Unknown at Unknown time   • phenylephrine-mineral oil-petrolatum (Preparation H) 0.25-14-74.9 % ointment hemorrhoidal ointment Insert 1 application into the rectum Daily As Needed.   Unknown at Unknown time       Allergies  Codeine    Scheduled Meds:amiodarone, 200 mg, Oral, Q24H  aspirin, 81 mg, Oral, Daily  atorvastatin, 40 mg, Oral, Nightly  budesonide, 0.5 mg, Nebulization, BID  cefTRIAXone, 2 g, Intravenous, Q24H  clopidogrel, 75 mg, Oral, Daily  donepezil, 10 mg, Oral, Nightly  insulin lispro, 0-7 Units, Subcutaneous, TID AC  ipratropium-albuterol, 3 mL, Nebulization, TID - RT  levothyroxine, 50 mcg, Oral, Q AM  lidocaine, 20 mL, Intradermal, Once  metoprolol tartrate, 25 mg, Oral, BID  midodrine, 15 mg, Oral, TID AC  pantoprazole, 40 mg, Oral, QAM  polyethylene glycol, 17 g, Oral, BID  sertraline, 50 mg, Oral, Daily  sodium chloride, 10 mL, Intravenous, Q12H  sodium chloride, 10 mL, Intravenous, Q12H  sorbitol, 50 mL, Oral, Daily      Continuous Infusions:   PRN Meds:.•  acetaminophen **OR** acetaminophen **OR** acetaminophen  •  acetaminophen  •  aluminum-magnesium hydroxide-simethicone  •  atropine  •  dextrose  •  dextrose  •  glucagon (human recombinant)  •  insulin lispro **AND** insulin lispro  •  ipratropium-albuterol  •  melatonin  •  Morphine  •  ondansetron **OR** ondansetron  •  oxyCODONE  •  sodium chloride  •  sodium chloride  •  sodium chloride  •  sodium chloride    Objective     VITAL SIGNS  Vitals:    05/15/22 0625 05/15/22 0629 05/15/22 0630 05/15/22 0636   BP:       Pulse: 56 66 65 66   Resp: 20 20 20 20   Temp:       TempSrc:      "  SpO2: 100% 100% 100% 100%   Weight:       Height:           Flowsheet Rows    Flowsheet Row First Filed Value   Admission Height 177.8 cm (70\") Documented at 05/06/2022 1026   Admission Weight 104 kg (229 lb 0.9 oz) Documented at 05/06/2022 1026            Intake/Output Summary (Last 24 hours) at 5/15/2022 0806  Last data filed at 5/14/2022 0814  Gross per 24 hour   Intake 100 ml   Output --   Net 100 ml        TELEMETRY: Atrial fibrillation    Physical Exam:  The patient is alert, oriented and in no distress.  Mild confusion.  Vital signs as noted above.  Exogenous obesity.  Head and neck revealed no carotid bruits or jugular venous distention.  No thyromegaly or lymphadenopathy is present  Lungs clear.  No wheezing.  Breath sounds are normal bilaterally.  Heart normal first and second heart sounds.  No murmur. No precordial rub is present.  No gallop is present.  Abdomen soft and nontender.  No organomegaly is present.  Extremities with good peripheral pulses without any pedal edema.  Cardiac cath site looks normal.  Skin warm and dry.  Musculoskeletal system is grossly normal  CNS grossly normal except for mild confusion      Results Review:   I reviewed the patient's new clinical results.  Lab Results (last 24 hours)     Procedure Component Value Units Date/Time    POC Glucose Once [[id number]]  (Abnormal) Collected: 05/15/22 0704    Specimen: Blood Updated: 05/15/22 0710     Glucose 57 mg/dL      Comment: Serial Number: [id number]:  [id number]       Renal Function Panel [[id number]]  (Abnormal) Collected: 05/15/22 0344    Specimen: Blood Updated: 05/15/22 0422     Glucose 116 mg/dL      BUN 13 mg/dL      Creatinine 2.68 mg/dL      Sodium 135 mmol/L      Potassium 4.6 mmol/L      Comment: Slight hemolysis detected by analyzer. Results may be affected.        Chloride 99 mmol/L      CO2 24.0 mmol/L      Calcium 8.6 mg/dL      Albumin 3.10 g/dL      Phosphorus 3.8 mg/dL      Anion Gap 12.0 mmol/L      " BUN/Creatinine Ratio 4.9     eGFR 24.7 mL/min/1.73      Comment: National Kidney Foundation and American Society of Nephrology (ASN) Task Force recommended calculation based on the Chronic Kidney Disease Epidemiology Collaboration (CKD-EPI) equation refit without adjustment for race.       Narrative:      GFR Normal >60  Chronic Kidney Disease <60  Kidney Failure <15      CBC & Differential [[id number]]  (Abnormal) Collected: 05/15/22 0344    Specimen: Blood Updated: 05/15/22 0406    Narrative:      The following orders were created for panel order CBC & Differential.  Procedure                               Abnormality         Status                     ---------                               -----------         ------                     CBC Auto Differential[[id number]]        Abnormal            Final result                 Please view results for these tests on the individual orders.    CBC Auto Differential [[id number]]  (Abnormal) Collected: 05/15/22 0344    Specimen: Blood Updated: 05/15/22 0406     WBC 5.40 10*3/mm3      RBC 3.45 10*6/mm3      Hemoglobin 10.2 g/dL      Hematocrit 33.0 %      MCV 95.8 fL      MCH 29.5 pg      MCHC 30.8 g/dL      RDW 17.6 %      RDW-SD 60.4 fl      MPV 7.7 fL      Platelets 188 10*3/mm3      Neutrophil % 72.6 %      Lymphocyte % 16.4 %      Monocyte % 8.5 %      Eosinophil % 1.8 %      Basophil % 0.7 %      Neutrophils, Absolute 3.90 10*3/mm3      Lymphocytes, Absolute 0.90 10*3/mm3      Monocytes, Absolute 0.50 10*3/mm3      Eosinophils, Absolute 0.10 10*3/mm3      Basophils, Absolute 0.00 10*3/mm3      nRBC 0.2 /100 WBC     Lactic Acid, Plasma [[id number]]  (Normal) Collected: 05/14/22 2216    Specimen: Blood Updated: 05/14/22 2235     Lactate 2.0 mmol/L     POC Glucose Once [[id number]]  (Normal) Collected: 05/14/22 2110    Specimen: Blood Updated: 05/14/22 2111     Glucose 105 mg/dL      Comment: Serial Number: [id number]:  [id number]       POC Glucose Once [[id number]]   (Abnormal) Collected: 05/14/22 2022    Specimen: Blood Updated: 05/14/22 2023     Glucose 107 mg/dL      Comment: Serial Number: [id number]:  [id number]       POC Glucose Once [[id number]]  (Normal) Collected: 05/14/22 1732    Specimen: Blood Updated: 05/14/22 2008     Glucose 93 mg/dL      Comment: Serial Number: [id number]:  [id number]       POC Glucose Once [[id number]]  (Normal) Collected: 05/14/22 1136    Specimen: Blood Updated: 05/14/22 1138     Glucose 81 mg/dL      Comment: Serial Number: [id number]:  [id number]       Lipid Panel [[id number]] Collected: 05/14/22 0837    Specimen: Blood Updated: 05/14/22 0944     Total Cholesterol 129 mg/dL      Triglycerides 87 mg/dL      HDL Cholesterol 58 mg/dL      LDL Cholesterol  54 mg/dL      VLDL Cholesterol 17 mg/dL      LDL/HDL Ratio 0.92    Narrative:      Cholesterol Reference Ranges  (U.S. Department of Health and Human Services ATP III Classifications)    Desirable          <200 mg/dL  Borderline High    200-239 mg/dL  High Risk          >240 mg/dL      Triglyceride Reference Ranges  (U.S. Department of Health and Human Services ATP III Classifications)    Normal           <150 mg/dL  Borderline High  150-199 mg/dL  High             200-499 mg/dL  Very High        >500 mg/dL    HDL Reference Ranges  (U.S. Department of Health and Human Services ATP III Classifications)    Low     <40 mg/dl (major risk factor for CHD)  High    >60 mg/dl ('negative' risk factor for CHD)        LDL Reference Ranges  (U.S. Department of Health and Human Services ATP III Classifications)    Optimal          <100 mg/dL  Near Optimal     100-129 mg/dL  Borderline High  130-159 mg/dL  High             160-189 mg/dL  Very High        >189 mg/dL    Comprehensive Metabolic Panel [[id number]]  (Abnormal) Collected: 05/14/22 0837    Specimen: Blood Updated: 05/14/22 0907     Glucose 137 mg/dL      BUN 10 mg/dL      Creatinine 2.09 mg/dL      Sodium 136 mmol/L      Potassium  4.7 mmol/L      Comment: Slight hemolysis detected by analyzer. Results may be affected.        Chloride 100 mmol/L      CO2 25.0 mmol/L      Calcium 8.6 mg/dL      Total Protein 6.1 g/dL      Albumin 3.10 g/dL      ALT (SGPT) 7 U/L      AST (SGOT) 20 U/L      Alkaline Phosphatase 57 U/L      Total Bilirubin 0.4 mg/dL      Globulin 3.0 gm/dL      A/G Ratio 1.0 g/dL      BUN/Creatinine Ratio 4.8     Anion Gap 11.0 mmol/L      eGFR 33.2 mL/min/1.73      Comment: National Kidney Foundation and American Society of Nephrology (ASN) Task Force recommended calculation based on the Chronic Kidney Disease Epidemiology Collaboration (CKD-EPI) equation refit without adjustment for race.       Narrative:      GFR Normal >60  Chronic Kidney Disease <60  Kidney Failure <15      Phosphorus [[id number]]  (Normal) Collected: 05/14/22 0837    Specimen: Blood Updated: 05/14/22 0907     Phosphorus 3.3 mg/dL     Magnesium [[id number]]  (Normal) Collected: 05/14/22 0837    Specimen: Blood Updated: 05/14/22 0907     Magnesium 2.0 mg/dL     CBC & Differential [[id number]]  (Abnormal) Collected: 05/14/22 0837    Specimen: Blood Updated: 05/14/22 0850    Narrative:      The following orders were created for panel order CBC & Differential.  Procedure                               Abnormality         Status                     ---------                               -----------         ------                     CBC Auto Differential[[id number]]        Abnormal            Final result                 Please view results for these tests on the individual orders.    CBC Auto Differential [[id number]]  (Abnormal) Collected: 05/14/22 0837    Specimen: Blood Updated: 05/14/22 0850     WBC 4.80 10*3/mm3      RBC 3.54 10*6/mm3      Hemoglobin 10.1 g/dL      Hematocrit 33.9 %      MCV 95.8 fL      MCH 28.6 pg      MCHC 29.8 g/dL      RDW 17.9 %      RDW-SD 61.3 fl      MPV 7.8 fL      Platelets 185 10*3/mm3      Neutrophil % 67.8 %      Lymphocyte % 20.6 %       Monocyte % 10.0 %      Eosinophil % 1.0 %      Basophil % 0.6 %      Neutrophils, Absolute 3.20 10*3/mm3      Lymphocytes, Absolute 1.00 10*3/mm3      Monocytes, Absolute 0.50 10*3/mm3      Eosinophils, Absolute 0.00 10*3/mm3      Basophils, Absolute 0.00 10*3/mm3      nRBC 0.1 /100 WBC           Imaging Results (Last 24 Hours)     Procedure Component Value Units Date/Time    XR Chest 1 View [[id number]] Collected: 05/15/22 0111     Updated: 05/15/22 0114    Narrative:      Exam:  Single view chest    Date: May 15, 2022    History: Shortness of breath    Comparison: May 11 2022    Technique: Single frontal radiograph of the chest was obtained    Findings:    The heart is enlarged. There is vascular congestion and interstitial edema. There are at least moderately sized bilateral pleural effusions. There are prior cardiothoracic surgical changes. There is a large bore catheter in right chest. There is no   pneumothorax.      Impression:      1. Persistent cardiomegaly with vascular congestion, interstitial edema and bilateral pleural effusions. The appearance is similar to the prior study.    Slot 63      Electronically signed by:  Oleg Villatoro M.D.    5/14/2022 11:13 PM      LAB RESULTS (LAST 7 DAYS)    CBC  Results from last 7 days   Lab Units 05/15/22  0344 05/14/22  0837 05/13/22  0408 05/12/22  0501 05/11/22  0555 05/11/22  0253 05/10/22  0706 05/09/22  0528   WBC 10*3/mm3 5.40 4.80 6.70 7.20 11.20*  --  6.70 6.90   RBC 10*6/mm3 3.45* 3.54* 3.58* 3.47* 3.71*  --  4.14 3.65*   HEMOGLOBIN g/dL 10.2* 10.1* 10.4* 10.3* 10.5*  --  11.9* 10.8*   HEMOGLOBIN, POC g/dL  --   --   --   --   --  13.4  --   --    HEMATOCRIT % 33.0* 33.9* 34.3* 32.7* 34.9*  --  39.4 34.6*   HEMATOCRIT POC %  --   --   --   --   --  39  --   --    MCV fL 95.8 95.8 96.0 94.2 94.0  --  95.2 94.7   PLATELETS 10*3/mm3 188 185 211 208 234  --  213 200       BMP  Results from last 7 days   Lab Units 05/15/22  0344 05/14/22  0837 05/13/22  0408  05/12/22  0949 05/12/22  0501 05/11/22  0555 05/10/22  0706 05/09/22 0228   SODIUM mmol/L 135* 136 133*  --  133* 133* 135* 130*   POTASSIUM mmol/L 4.6 4.7 4.4  --  4.2 3.2* 4.1 4.0   CHLORIDE mmol/L 99 100 98  --  100 97* 97* 93*   CO2 mmol/L 24.0 25.0 25.0  --  23.0 24.0 24.0 24.0   BUN mg/dL 13 10 16  --  12 21 17 24*   CREATININE mg/dL 2.68* 2.09* 2.90*  --  2.48* 3.41* 2.86* 3.39*   GLUCOSE mg/dL 116* 137* 97  --  122* 167* 132* 133*   MAGNESIUM mg/dL  --  2.0 2.3 2.2 1.9 2.1  --  1.9   PHOSPHORUS mg/dL 3.8 3.3 3.2  --  2.0* 2.7  --  2.2*       CMP   Results from last 7 days   Lab Units 05/15/22  0344 05/14/22  0837 05/13/22  0408 05/12/22  0501 05/11/22  0555 05/10/22  0706 05/09/22 0228   SODIUM mmol/L 135* 136 133* 133* 133* 135* 130*   POTASSIUM mmol/L 4.6 4.7 4.4 4.2 3.2* 4.1 4.0   CHLORIDE mmol/L 99 100 98 100 97* 97* 93*   CO2 mmol/L 24.0 25.0 25.0 23.0 24.0 24.0 24.0   BUN mg/dL 13 10 16 12 21 17 24*   CREATININE mg/dL 2.68* 2.09* 2.90* 2.48* 3.41* 2.86* 3.39*   GLUCOSE mg/dL 116* 137* 97 122* 167* 132* 133*   ALBUMIN g/dL 3.10* 3.10* 2.90* 3.00* 2.80* 2.90*  --    BILIRUBIN mg/dL  --  0.4 0.3 0.3 0.3 0.4  --    ALK PHOS U/L  --  57 49 45 46 56  --    AST (SGOT) U/L  --  20 11 12 10 12  --    ALT (SGPT) U/L  --  7 6 7 8 6  --          BNP        TROPONIN  Results from last 7 days   Lab Units 05/13/22  0408   TROPONIN T ng/mL 0.583*       CoAg  Results from last 7 days   Lab Units 05/13/22  0408 05/10/22  0706   INR  1.12* 1.09       Creatinine Clearance  Estimated Creatinine Clearance: 30.7 mL/min (A) (by C-G formula based on SCr of 2.68 mg/dL (H)).    ABG  Results from last 7 days   Lab Units 05/11/22  0253   PH, ARTERIAL pH units 7.362   PCO2, ARTERIAL mm Hg 47.0   PO2 ART mm Hg 100.1   O2 SATURATION ART % 97.4   BASE EXCESS ART mmol/L 0.7       Radiology  XR Chest 1 View    Result Date: 5/15/2022  1. Persistent cardiomegaly with vascular congestion, interstitial edema and bilateral pleural  effusions. The appearance is similar to the prior study. Slot 63 Electronically signed by:  Oleg Villatoro M.D.  5/14/2022 11:13 PM          EKG                      I personally viewed and interpreted the patient's EKG/Telemetry data: Atrial fibrillation    ECHOCARDIOGRAM:    Results for orders placed during the hospital encounter of 05/06/22    Adult Transesophageal Echo (MARSHA) W/ Cont if Necessary Per Protocol    Interpretation Summary  Date of study  5/13/2022    Indications  Bacteremia  Assessment for bacterial endocarditis    Procedure  Anesthesia was provided by anesthesiologist with intravenous Diprivan.  MARSHA probe could be passed without difficulty.  Patient tolerated the procedure well.  No complications were noted.    Procedure performed  Transesophageal echocardiogram Doppler study (color continuous-wave and pulsed wave)    Results  Technically satisfactory study.  Mitral valve is structurally normal.  Tricuspid valve is normal.  Aortic valve is tricuspid and is normal.  Mild mitral aortic and tricuspid regurgitation is present.  Biatrial enlargement is present.  Left atrial appendage enlargement without clot.  Diffuse left ventricular enlargement and hypocontractility is present with ejection fraction of 25 to 30%.  No pericardial fusion or intracardiac thrombus is present.  Right atrium right ventricle enlargement is present.  Bubble study revealed PFO.  No pericardial effusion or intracardiac thrombus is seen.    Impression  Biatrial and biventricular enlargement.  Diffuse left ventricular hypocontractility with ejection fraction of 25 to 30%.  Mild smoking effect in the left atrium.  Small PFO.  Mild mitral aortic and tricuspid regurgitation is present.          STRESS MYOVIEW:    Cardiolite (Tc-99m Sestamibi) stress test    CARDIAC CATHETERIZATION:            OTHER:         Assessment & Plan     Principal Problem:    Chronic systolic (congestive) heart failure (HCC)  Active Problems:    S/P CABG  (coronary artery bypass graft)    Primary hypertension    Elevated troponin    ANNETTE treated with BiPAP    Major depressive disorder, recurrent, mild (HCC)    Mixed hyperlipidemia    Flaccid hemiplegia affecting right dominant side (HCC)    Diabetic neuropathy (HCC)    Chronic obstructive pulmonary disease with (acute) exacerbation (HCC)    Anemia of renal disease    End stage renal disease (HCC)    Chronic respiratory failure with hypoxia, on home oxygen therapy (HCC)    Other specified hypothyroidism    Fluid overload    Stage 5 chronic kidney disease on chronic dialysis (HCC)    Bacteremia    Normocytic anemia due to blood loss    Essential (primary) hypertension    Cardiopulmonary arrest with successful resuscitation (HCC)    Ventricular fibrillation (HCC)      [[[[[[[[[[[[[[[[[[[[[[[[[    Impression  ==============  -Shortness of breath     - Positive blood cultures for Streptococcus 2 out of 2 sets.  Rule out tunneled catheter infection.     -Acute on chronic fluid overload.      -status post CABG 2005. status post stent to LAD 2009    Status post stent to LAD 11/13/2015  Status post stent to mid LAD and SVG to marginal branch 09/16/2016.  Status post stent to mid LAD 5/24/2021  Status post stent to SVG to RCA 5/26/2021  Status post stent to proximal LAD 5/13/2022     Cardiac catheterization 5/13/2022 revealed  Left ventricle is significantly enlarged with severe and diffuse hypocontractility with ejection fraction of 20%.  Left main coronary artery is normal.  Left anterior descending artery has proximal 90% disease.  Mid segment stents are patent.  Circumflex coronary artery is totally occluded.  Right coronary artery is totally occluded.  SVG to marginal branch is totally and chronically occluded.  SVG to RCA is patent.  Left subclavian artery is normal.  Left internal mammary artery is not a graft and is normal.    Cardiac catheterization 5/24/2021 revealed  Left ventricle angiogram was not performed due to  pre-existing renal dysfunction.  Left main coronary artery normal.  Left anterior descending artery has mid segment lengthy 90% disease within the previously placed stent.  Distal left into descending artery has diffuse disease.  Circumflex coronary artery is totally occluded.  (Chronic)  Right coronary artery is chronically occluded.  SVG to marginal branch (jump graft to OM1 and OM 2) is totally occluded (new finding compared to 2015.  SVG to PDA has distal radiolucency.  However no definite obstructive disease is present.       -status post myocardial infarction 2000 and 2002 .      -history of intermittent but mostly persistent atrial fibrillation     -Acute on chronic congestive heart failure.  Compensated at this time.     Recent echocardiogram showed left ventricle dysfunction with ejection fraction of 35%.     -History of right-sided weakness with left MCA territory stroke.-Improved    MARSHA 5/13/2022 revealed  Biatrial and biventricular enlargement.  Diffuse left ventricular hypocontractility with ejection fraction of 25 to 30%.  Mild smoking effect in the left atrium.  Small PFO.  Mild mitral aortic and tricuspid regurgitation is present.     Transesophageal echocardiogram 11/30/2020.  Biatrial enlargement.  Smoking effect in the left atrium and left ventricle and left atrial appendage.  Mild mitral and aortic regurgitation.  Left ventricle enlargement with diffuse hypocontractility with ejection fraction of 35 to 40%.     Echocardiogram 11/27/2020 revealed left atrial enlargement left ventricle dysfunction with ejection fraction of 40%.  Negative bubble study.      -diabetes hypertension and sleep apnea.  ESRD.     -status post colon surgery (partial colectomy) appendectomy cholecystectomy right 4th toe removal and carpal tunnel surgery      -continued smoking 1 pack per day -abstinence from smoking      -allergy to codeine.  ============   Plan  ================  Shortness of breath  Positive blood  cultures for Streptococcus 2 out of 2 sets.  Rule out tunneled catheter infection..  MARSHA 5/13/2022-negative for vegetations.     Status post CABG  Recent CODE BLUE and ventricular fibrillation.  Patient had cardiac catheterization and stent placement to proximal LAD 5/13/2022.  Patient is not having any angina pectoris or congestive heart failure     Atrial fibrillation-chronic  Rate is better controlled  Patient is on Coreg at 12.5 mg p.o. twice a day amiodarone and Cardizem.      ESRD  Patient is undergoing dialysis.  Patient had dialysis yesterday    Hypokalemia  k 3.2-supplements.  - Improved at 4.0       Anticoagulation  Patient was on Eliquis 5 mg twice a day.  Observe for toxic effects.  Eliquis is on hold.  Will restart tomorrow.    Ischemic cardiomyopathy.  Recent ventricular fibrillation probably due to severe proximal left anterior descending artery disease.  Likely left ventricular dysfunction we will continue despite having recent stent placement.  Likely patient would benefit from ICD placement (single-chamber).  Patient has narrow QRS complex.  However would like to wait until the infection is cleared up.  Patient may benefit from LifeVest.  I am not sure however patient would comply with wearing LifeVest.  We discussed with infectious disease regarding timing of placement of ICD.    Current medications include amiodarone 200 mg a day aspirin atorvastatin Plavix levothyroxine metoprolol 25 mg twice a day midodrine 15 mg 3 times daily pantoprazole.     Follow-up labs ordered.     Further plan will depend on patient's progress.   ]]]]]]]]]]]]]]]]]]]]]]       Jose G Rm MD  05/15/22  08:06 EDT

## 2022-05-15 NOTE — PROGRESS NOTES
[facility]:    Name: Benson Mclean  Age: 71 y.o.  : 1950  Sex: male    05/15/22    Chief complaint/reason for evaluation: ESRD, dialysis management      Subjective  :  A bit more confused today no new complaints, underwent MARSHA yesterday and heart cath, status post PCI to the LAD  MARSHA, EF 25 to 30%, no vegetations were mentioned  Received dialysis late last night, tolerated well  Denies any shortness of breath or chest pain  Minimal edema, no nausea or vomiting    5/15: Denies any new complaints, feels well  No shortness of breath or chest pain       Objective:    Vital Signs  Temp:  [96.5 °F (35.8 °C)-98 °F (36.7 °C)] 97 °F (36.1 °C)  Heart Rate:  [56-69] 69  Resp:  [20-22] 22  BP: (100-135)/(49-76) 110/56      No intake or output data in the 24 hours ending 05/15/22 1143        Physical Exam  Physical Exam  Constitutional:       General: He is not in acute distress.     Appearance: He is not diaphoretic.   HENT:      Head: Normocephalic and atraumatic.      Nose: Nose normal.   Eyes:      Pupils: Pupils are equal, round, and reactive to light.   Neck:      Thyroid: No thyromegaly.      Vascular: No JVD.      Trachea: No tracheal deviation.   Cardiovascular:      Rate and Rhythm: Normal rate and regular rhythm.      Heart sounds: No murmur heard.    No friction rub. No gallop.   Pulmonary:      Effort: Pulmonary effort is normal. No respiratory distress.      Breath sounds: No stridor. No wheezing or rales.   Chest:      Chest wall: No tenderness.   Abdominal:      General: Bowel sounds are normal.      Palpations: Abdomen is soft. There is no mass.      Tenderness: There is no abdominal tenderness. There is no guarding or rebound.      Hernia: No hernia is present.   Musculoskeletal:         General: No tenderness or deformity. Normal range of motion.      Cervical back: Normal range of motion and neck supple.   Skin:     General: Skin is warm and dry.      Coloration: Skin is not pale.       Findings: No rash.   Neurological:      Mental Status: He is alert and oriented to person, place, and time.      Cranial Nerves: No cranial nerve deficit.      Motor: No abnormal muscle tone.      Coordination: Coordination normal.      Deep Tendon Reflexes: Reflexes are normal and symmetric. Reflexes normal.   Psychiatric:         Behavior: Behavior normal.         Thought Content: Thought content normal.         Judgment: Judgment normal.            Results Review:      Results from last 7 days   Lab Units 05/15/22  0344 05/14/22  0837 05/13/22  0408 05/12/22  0501 05/11/22  0555 05/10/22  0706   SODIUM mmol/L 135* 136 133* 133* 133* 135*   CO2 mmol/L 24.0 25.0 25.0 23.0 24.0 24.0   BUN mg/dL 13 10 16 12 21 17   CREATININE mg/dL 2.68* 2.09* 2.90* 2.48* 3.41* 2.86*   CALCIUM mg/dL 8.6 8.6 8.2* 8.1* 8.0* 8.7   ALBUMIN g/dL 3.10* 3.10* 2.90* 3.00* 2.80* 2.90*   AST (SGOT) U/L  --  20 11 12 10 12   ALT (SGPT) U/L  --  7 6 7 8 6   EGFR mL/min/1.73 24.7* 33.2* 22.4* 27.1* 18.5* 22.8*       Results from last 7 days   Lab Units 05/15/22  0344 05/14/22  0837 05/13/22  0408 05/12/22  0501 05/11/22  0555 05/10/22  0706 05/09/22  0528   WBC 10*3/mm3 5.40 4.80 6.70 7.20 11.20* 6.70 6.90   INR   --   --  1.12*  --   --  1.09  --          Medication Review:   amiodarone, 200 mg, Oral, Q24H  apixaban, 5 mg, Oral, Q12H  aspirin, 81 mg, Oral, Daily  atorvastatin, 40 mg, Oral, Nightly  budesonide, 0.5 mg, Nebulization, BID  cefTRIAXone, 2 g, Intravenous, Q24H  clopidogrel, 75 mg, Oral, Daily  donepezil, 10 mg, Oral, Nightly  insulin lispro, 0-7 Units, Subcutaneous, TID AC  ipratropium-albuterol, 3 mL, Nebulization, TID - RT  levothyroxine, 50 mcg, Oral, Q AM  lidocaine, 20 mL, Intradermal, Once  metoprolol tartrate, 25 mg, Oral, BID  midodrine, 15 mg, Oral, TID AC  pantoprazole, 40 mg, Oral, QAM  polyethylene glycol, 17 g, Oral, BID  sertraline, 50 mg, Oral, Daily  sodium chloride, 10 mL, Intravenous, Q12H  sodium chloride, 10 mL,  Intravenous, Q12H  sorbitol, 50 mL, Oral, Daily      Data:  Heart catheterization and MARSHA results noted      Assessment:    End-stage renal disease, HD MWF    Bacteremia, no vegetations on MARSHA.  It appears per ID notes that the plan is to keep his CVC in for now and treat with IV antibiotics, will likely need catheter exchange if the bacteremia recurs after antibiotics.  On IV Rocephin    S/P CODE, status post heart cath, PTCA    CAD status post PTCA yesterday    Systolic CHF, EF 30%    Atrial fibrillation, on oral amiodarone    Chronic hypotension on midodrine    Prior CVA    Diabetes mellitus type II      Plan:  Continue Monday Wednesday Friday HD schedule, UF interval weight gain  Midodrine for BP support  Hemoglobin above threshold for TAMERA, will monitor  ABX per ID, no vegetations on MARSHA.  Plan is to keep CVC in place for now  Monitor electrolytes and volume closely  Await additional plans per cardiology    Crow Vences MD  05/15/22  11:43 EDT  Tel: [phone]  Fax: [phone]

## 2022-05-15 NOTE — PLAN OF CARE
Goal Outcome Evaluation:    Patient has been resting this shift.  At the beginning of shift, patient was lethargic and only arousing to loud voice.  On call provider was notified and new orders were put in.  RT placed patient on bipap.  Bipap was worn for a few hours and now patient is asking questions about what brought him here.  Patient reported feeling lke he was drowning in the bipap so patient is now back on 2L NC.

## 2022-05-15 NOTE — PROGRESS NOTES
Infectious Diseases Progress Note      LOS: 8 days   Patient Care Team:  Rudolph Rooney MD as PCP - General (Family Medicine)  Samantha Zabala APRN as PCP - Family Medicine  Jose G Rm MD as Consulting Physician (Cardiology)    Chief Complaint: Shortness of breath    Subjective       The patient has been afebrile for the last 24 hours.  The patient is hemodynamically stable requiring no vasopressors.  He is currently on 2 L of oxygen via nasal cannula      Review of Systems:   Review of Systems   Constitutional: Positive for fatigue.   HENT: Negative.    Eyes: Negative.    Respiratory: Positive for shortness of breath.    Cardiovascular: Negative.    Gastrointestinal: Negative.    Endocrine: Negative.    Genitourinary: Negative.    Musculoskeletal: Negative.    Skin: Negative.    Neurological: Positive for weakness.   Psychiatric/Behavioral: Negative.    All other systems reviewed and are negative.       Objective     Vital Signs  Temp:  [96.6 °F (35.9 °C)-98 °F (36.7 °C)] 98 °F (36.7 °C)  Heart Rate:  [62-71] 68  Resp:  [16-20] 20  BP: ()/(43-62) 107/50    Physical Exam:  Physical Exam  Vitals and nursing note reviewed.   Constitutional:       General: He is not in acute distress.     Appearance: Normal appearance. He is well-developed and normal weight. He is ill-appearing. He is not diaphoretic.   HENT:      Head: Normocephalic and atraumatic.   Eyes:      Conjunctiva/sclera: Conjunctivae normal.      Pupils: Pupils are equal, round, and reactive to light.   Cardiovascular:      Rate and Rhythm: Normal rate and regular rhythm.      Heart sounds: Normal heart sounds, S1 normal and S2 normal.   Pulmonary:      Effort: Pulmonary effort is normal. No respiratory distress.      Breath sounds: No stridor. Rales present. No wheezing.   Abdominal:      General: Bowel sounds are normal. There is no distension.      Palpations: Abdomen is soft. There is no mass.      Tenderness: There is no abdominal  tenderness. There is no guarding.   Musculoskeletal:         General: No deformity. Normal range of motion.      Cervical back: Neck supple.   Skin:     General: Skin is warm and dry.      Coloration: Skin is not pale.      Findings: No erythema or rash.   Neurological:      Mental Status: He is alert and oriented to person, place, and time.      Cranial Nerves: No cranial nerve deficit.      Comments: Generalized weakness   Psychiatric:         Mood and Affect: Mood normal.          Results Review:    I have reviewed all clinical data, test, lab, and imaging results.     Radiology  No Radiology Exams Resulted Within Past 24 Hours    Cardiology    Laboratory    Results from last 7 days   Lab Units 05/14/22  0837 05/13/22  0408 05/12/22  0501 05/11/22  0555 05/11/22  0253 05/10/22  0706 05/09/22  0528 05/08/22  0446   WBC 10*3/mm3 4.80 6.70 7.20 11.20*  --  6.70 6.90 6.10   HEMOGLOBIN g/dL 10.1* 10.4* 10.3* 10.5*  --  11.9* 10.8* 10.9*   HEMOGLOBIN, POC g/dL  --   --   --   --  13.4  --   --   --    HEMATOCRIT % 33.9* 34.3* 32.7* 34.9*  --  39.4 34.6* 35.8*   HEMATOCRIT POC %  --   --   --   --  39  --   --   --    PLATELETS 10*3/mm3 185 211 208 234  --  213 200 173     Results from last 7 days   Lab Units 05/14/22  0837 05/13/22 0408 05/12/22  0501 05/11/22  0555 05/10/22  0706 05/09/22  0228 05/08/22  0446   SODIUM mmol/L 136 133* 133* 133* 135* 130* 133*   POTASSIUM mmol/L 4.7 4.4 4.2 3.2* 4.1 4.0 4.0   CHLORIDE mmol/L 100 98 100 97* 97* 93* 97*   CO2 mmol/L 25.0 25.0 23.0 24.0 24.0 24.0 25.0   BUN mg/dL 10 16 12 21 17 24* 19   CREATININE mg/dL 2.09* 2.90* 2.48* 3.41* 2.86* 3.39* 2.94*   GLUCOSE mg/dL 137* 97 122* 167* 132* 133* 116*   ALBUMIN g/dL 3.10* 2.90* 3.00* 2.80* 2.90*  --  2.60*   BILIRUBIN mg/dL 0.4 0.3 0.3 0.3 0.4  --  0.4   ALK PHOS U/L 57 49 45 46 56  --  52   AST (SGOT) U/L 20 11 12 10 12  --  12   ALT (SGPT) U/L 7 6 7 8 6  --  5   CALCIUM mg/dL 8.6 8.2* 8.1* 8.0* 8.7 7.9* 8.1*                  Microbiology   Microbiology Results (last 10 days)     Procedure Component Value - Date/Time    MRSA Screen, PCR (Inpatient) - Swab, Nares [[id number]]  (Normal) Collected: 05/11/22 0557    Lab Status: Final result Specimen: Swab from Nares Updated: 05/11/22 0804     MRSA PCR No MRSA Detected    COVID PRE-OP / PRE-PROCEDURE SCREENING ORDER (NO ISOLATION) - Swab, Nasopharynx [[id number]]  (Normal) Collected: 05/10/22 0635    Lab Status: Final result Specimen: Swab from Nasopharynx Updated: 05/10/22 1143    Narrative:      The following orders were created for panel order COVID PRE-OP / PRE-PROCEDURE SCREENING ORDER (NO ISOLATION) - Swab, Nasopharynx.  Procedure                               Abnormality         Status                     ---------                               -----------         ------                     COVID-19,CEPHEID/ROHAN,CO...[[id number]]  Normal              Final result                 Please view results for these tests on the individual orders.    COVID-19,CEPHEID/ROHAN,COR/ZEYNEP/PAD/BEATRICE IN-HOUSE(OR EMERGENT/ADD-ON),NP SWAB IN TRANSPORT MEDIA 3-4 HR TAT, RT-PCR - Swab, Nasopharynx [[id number]]  (Normal) Collected: 05/10/22 0635    Lab Status: Final result Specimen: Swab from Nasopharynx Updated: 05/10/22 1143     COVID19 Not Detected    Narrative:      Fact sheet for providers: https://www.fda.gov/media/301313/download     Fact sheet for patients: https://www.fda.gov/media/479917/download  Fact sheet for providers: https://www.fda.gov/media/170108/download     Fact sheet for patients: https://www.fda.gov/media/648466/download    Blood Culture - Blood, Hand, Right [[id number]]  (Normal) Collected: 05/07/22 1519    Lab Status: Final result Specimen: Blood from Hand, Right Updated: 05/12/22 1617     Blood Culture No growth at 5 days    CANDIDA AURIS SCREEN - Swab, Axilla Right, Axilla Left and Groin [[id number]]  (Normal) Collected: 05/07/22 1020    Lab Status: Final result Specimen: Swab from  Axilla Right, Axilla Left and Groin Updated: 05/12/22 0739     Candida Auris Screen Culture No Candida auris isolated at 5 days    COVID PRE-OP / PRE-PROCEDURE SCREENING ORDER (NO ISOLATION) - Swab, Nasopharynx [[id number]]  (Normal) Collected: 05/06/22 1519    Lab Status: Final result Specimen: Swab from Nasopharynx Updated: 05/06/22 1542    Narrative:      The following orders were created for panel order COVID PRE-OP / PRE-PROCEDURE SCREENING ORDER (NO ISOLATION) - Swab, Nasopharynx.  Procedure                               Abnormality         Status                     ---------                               -----------         ------                     COVID-19,CEPHEID/ROHAN,CO...[[id number]]  Normal              Final result                 Please view results for these tests on the individual orders.    COVID-19,CEPHEID/ROHAN,COR/ZEYNEP/PAD/BEATRICE IN-HOUSE(OR EMERGENT/ADD-ON),NP SWAB IN TRANSPORT MEDIA 3-4 HR TAT, RT-PCR - Swab, Nasopharynx [[id number]]  (Normal) Collected: 05/06/22 1519    Lab Status: Final result Specimen: Swab from Nasopharynx Updated: 05/06/22 1542     COVID19 Not Detected    Narrative:      Fact sheet for providers: https://www.fda.gov/media/997595/download     Fact sheet for patients: https://www.fda.gov/media/909925/download  Fact sheet for providers: https://www.fda.gov/media/562835/download    Fact sheet for patients: https://www.fda.gov/media/176209/download    Test performed by PCR.    Blood Culture - Blood, Hand, Right [[id number]]  (Abnormal)  (Susceptibility) Collected: 05/06/22 1116    Lab Status: Final result Specimen: Blood from Hand, Right Updated: 05/09/22 0703     Blood Culture Streptococcus gallolyticus ssp pasteurianus     Isolated from Aerobic and Anaerobic Bottles     Gram Stain Anaerobic Bottle Gram positive cocci      Aerobic Bottle Gram positive cocci    Susceptibility      Streptococcus gallolyticus ssp pasteurianus      MONROE      Ceftriaxone Susceptible      Penicillin G  Susceptible      Vancomycin Susceptible                           Blood Culture ID, PCR - Blood, Hand, Right [[id number]]  (Abnormal) Collected: 05/06/22 1116    Lab Status: Final result Specimen: Blood from Hand, Right Updated: 05/07/22 0216     BCID, PCR Streptococcus spp, not A, B, or pneumonia. Identification by BCID2 PCR.     BOTTLE TYPE Anaerobic Bottle    Blood Culture - Blood, Arm, Right [[id number]]  (Abnormal) Collected: 05/06/22 1057    Lab Status: Final result Specimen: Blood from Arm, Right Updated: 05/09/22 0704     Blood Culture Streptococcus gallolyticus ssp pasteurianus     Isolated from Aerobic and Anaerobic Bottles     Gram Stain Anaerobic Bottle Gram positive cocci      Aerobic Bottle Gram positive cocci    Narrative:      Refer to previous blood culture collected on 5/6/2022 1116 for MICs.            Medication Review:       Schedule Meds  amiodarone, 200 mg, Oral, Q24H  aspirin, 81 mg, Oral, Daily  atorvastatin, 40 mg, Oral, Nightly  budesonide, 0.5 mg, Nebulization, BID  cefTRIAXone, 2 g, Intravenous, Q24H  clopidogrel, 75 mg, Oral, Daily  donepezil, 10 mg, Oral, Nightly  insulin lispro, 0-7 Units, Subcutaneous, TID AC  ipratropium-albuterol, 3 mL, Nebulization, TID - RT  levothyroxine, 50 mcg, Oral, Q AM  lidocaine, 20 mL, Intradermal, Once  metoprolol tartrate, 25 mg, Oral, BID  midodrine, 15 mg, Oral, TID AC  pantoprazole, 40 mg, Oral, QAM  polyethylene glycol, 17 g, Oral, BID  sertraline, 50 mg, Oral, Daily  sodium chloride, 10 mL, Intravenous, Q12H  sodium chloride, 10 mL, Intravenous, Q12H  sorbitol, 50 mL, Oral, Daily        Infusion Meds       PRN Meds  •  acetaminophen **OR** acetaminophen **OR** acetaminophen  •  acetaminophen  •  aluminum-magnesium hydroxide-simethicone  •  atropine  •  dextrose  •  dextrose  •  glucagon (human recombinant)  •  insulin lispro **AND** insulin lispro  •  ipratropium-albuterol  •  melatonin  •  Morphine  •  ondansetron **OR** ondansetron  •  oxyCODONE  •   sodium chloride  •  sodium chloride  •  sodium chloride  •  sodium chloride        Assessment/Plan       Antimicrobial Therapy   1.  IV ceftriaxone        2.        3.        4.        5.            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  We need to rule out endocarditis or GI malignancy  Colonoscopy was done on May 10, 2022 and found to have polyps and biopsies are pending  -MARSHA was negative for vegetation  -Blood culture from 5/7/2022 is negative     The patient presented hospital with shortness of breath and chest x-ray showed density at the right base.  Need to rule out pneumonia  -CT showed some large bilateral pleural effusions but no obvious pneumonia which most likely secondary to volume overload  -Patient is currently on 2 L of oxygen by cannula     End-stage renal disease on hemodialysis.  Patient had right chest tunneled dialysis catheter and left arm AV fistula     History of CVA    S/p cardiac arrest with V. fib rhythm resuscitated successfully on May 11, 2022  -Patient had a heart catheterization with stent to the LAD 5/13/2020 we have reviewed the [facility] labs, notes, and imagining studies for today. We are keeping the same antimicrobial therapy at this time.     Plan     Continue ceftriaxone 2 g IV daily for 2 weeks from the last negative blood culture on 5/7/2022-last day on 5/20/2022  Cardiology is considering placing a ICD-can be done anytime next week  Supportive care  A.ariella Valencia, FABIOLA  05/14/22  20:15 EDT    Note is dictated utilizing voice recognition software/Dragon

## 2022-05-16 NOTE — PROGRESS NOTES
Nutrition Services    Patient Name: Benson Mclean  YOB: 1950  MRN: [id number]  Admission date: 5/6/2022    Comment:  -- Continue Boost Glucose Control BID (Provides 380 kcals, 32 g protein if consumed)       PPE Documentation        PPE Worn By Provider Gown, Gloves, Eye Protection, Mask    PPE Worn By Patient  None      CLINICAL NUTRITION ASSESSMENT      Reason for Assessment Follow up protocol   5/9: Wound assessment     H&P      Past Medical History:   Diagnosis Date   • A-fib ([facility]) 8/3/2021   • Anemia    • Appetite loss    • Arthritis    • Brain bleed ([facility])    • Carpal tunnel syndrome on left    • CHF (congestive heart failure) ([facility])    • Chronic left-sided low back pain without sciatica 12/27/2017   • CKD (chronic kidney disease) stage 3, GFR 30-59 ml/min ([facility])    • Closed fracture of seventh thoracic vertebra ([facility]) 8/23/2020   • Cognitive communication deficit 12/1/2020   • COPD (chronic obstructive pulmonary disease) ([facility])    • Coronary artery disease    • COVID-19 2/19/2021   • Cytokine release syndrome, grade 1 5/24/2021   • Depression    • Diabetic neuropathy ([facility])    • Dialysis patient ([facility])     mon wed fri   • DM2 (diabetes mellitus, type 2) ([facility])    • GERD (gastroesophageal reflux disease)    • Hyperlipidemia    • Hypertension    • Hypogonadism in male    • Neuropathy    • Nonsustained ventricular tachycardia ([facility]) 7/23/2021   • Obesity    • Paroxysmal atrial fibrillation ([facility]) 12/1/2020   • Sleep apnea    • Stroke ([facility])    • Unsteady gait        Past Surgical History:   Procedure Laterality Date   • ANGIOPLASTY      X2   • APPENDECTOMY     • ARTERIOVENOUS FISTULA/SHUNT SURGERY Left 1/20/2022    Procedure: LEFT BRACHIAL CEPHALIC ARTERIAL VENOUS FISTULA CREATION;  Surgeon: Yony Davila MD;  Location: [facility] OR;  Service: Vascular;  Laterality: Left;   • CARDIAC CATHETERIZATION  11/13/2015   • CARDIAC CATHETERIZATION N/A 5/24/2021    Procedure: Left Heart Cath and  coronary angiogram;  Surgeon: Jose G Rm MD;  Location:  [facility] CATH INVASIVE LOCATION;  Service: Cardiovascular;  Laterality: N/A;   • CARDIAC CATHETERIZATION  5/24/2021    Procedure: Saphenous Vein Graft;  Surgeon: Jose G Rm MD;  Location:  [facility] CATH INVASIVE [facility]ATION;  Service: Cardiovascular;;   • CARDIAC CATHETERIZATION N/A 5/24/2021    Procedure: Percutaneous Coronary Intervention;  Surgeon: Bernabe Zambrano MD;  Location:  [facility] INVASIVE [facility];  Service: Cardiology;  Laterality: N/A;   • CARDIAC CATHETERIZATION N/A 5/24/2021    Procedure: Stent NIELS coronary;  Surgeon: Bernabe Zambrano MD;  Location:  [facility];  Service: Cardiology;  Laterality: N/A;   • CARDIAC CATHETERIZATION N/A 5/26/2021    Procedure: Percutaneous Coronary Intervention;  Surgeon: Bernabe Zambrano MD;  Location:  [facility]VASIVE [facility];  Service: Cardiovascular;  Laterality: N/A;   • CARDIAC CATHETERIZATION N/A 5/26/2021    Procedure: Stent NIELS bypass graft;  Surgeon: Bernabe Zambrano MD;  Location:  [facility];  Service: Cardiovascular;  Laterality: N/A;   • CARDIAC CATHETERIZATION N/A 5/13/2022    Procedure: Left Heart Cath and coronary angiogram;  Surgeon: Jose G Rm MD;  Location:  [facility];  Service: Cardiovascular;  Laterality: N/A;   • CARDIAC CATHETERIZATION  5/13/2022    Procedure: Saphenous Vein Graft;  Surgeon: Jose G Rm MD;  Location:  [facility];  Service: Cardiovascular;;   • CARDIAC CATHETERIZATION N/A 5/13/2022    Procedure: Stent NIELS coronary;  Surgeon: Estefany Hyde MD;  Location:  [facility]VASIVE [facility];  Service: Cardiovascular;  Laterality: N/A;   • CARDIAC ELECTROPHYSIOLOGY PROCEDURE N/A 5/24/2021    Procedure: Temporary Pacemaker;  Surgeon: Bernabe Zambrano MD;  Location:  [facility] INV[facility];  Service: Cardiology;  Laterality: N/A;   • CARDIAC ELECTROPHYSIOLOGY PROCEDURE N/A 5/26/2021  "   Procedure: Temporary Pacemaker;  Surgeon: Bernabe Zambrano MD;  Location: [facility] CATH INVASIVE LOCATION;  Service: Cardiovascular;  Laterality: N/A;   • CARPAL TUNNEL RELEASE Left 04/29/2018    carpal tunnel- lt hand// other hand surgeries    • CATARACT EXTRACTION, BILATERAL  2002    Dr. Lux Acosta   • CHOLECYSTECTOMY     • COLON RESECTION  2005   • COLONOSCOPY N/A 5/10/2022    Procedure: COLONOSCOPY with polypectomy x3;  Surgeon: Herbie Keane MD;  Location: [facility] ENDOSCOPY;  Service: Gastroenterology;  Laterality: N/A;  colon polyps;internal hemorrhoids   • CORONARY ANGIOPLASTY     • CORONARY ANGIOPLASTY WITH STENT PLACEMENT  11/13/2015    PTCA stent to proximal in stent and mid to distal lad   • CORONARY ANGIOPLASTY WITH STENT PLACEMENT  09/16/2016    PTCA stent to mid lad and stent to vein graft to marginal   • CORONARY ARTERY BYPASS GRAFT  2005    @ [facility]   • CYST REMOVAL      cyst removed from scrotum   • FOOT SURGERY Right 07/17/2018   • FOOT SURGERY Left 02/04/2019   • PORTACATH PLACEMENT     • TOE AMPUTATION Right     right toe removed D/T infected cut that went to the bone        Current Problems   Shortness of breath, 2/2 fluid overload  - ECHO 5/13  - S/p colonoscopy 5/10    Chronic respiratory failure    ESRD on HD    CVA    CAD, s/p CABG    HLD    Afib    Hypothyroidism    ANNETTE    Dysphagia  -ST following       Encounter Information        Trending Narrative     5/16: RD attempted to visit patient at bedside.  Patient asleep.  Noted with electronic sitter at bedside.  RD left patient to rest.      5/9: Patient currently in HD at time of assessment. Chart reviewed, noted ST assessment clearing patient for Regular diet.     Anthropometrics        Current Height, Weight Height: 177.8 cm (70\")  Weight: 102 kg (224 lb 14.6 oz) (05/16/22 0500)       Ideal Body Weight (IBW) 166lb   Usual Body Weight (UBW) OZIEL       Trending Weight Hx     This admission: 5/16: 7.8% weight loss since last full review (no " significant fluid changes documented, no diuretics, however admitted with fluid overload)  5/9: stable since admit             PTA: 5/9: current weight down 6% x1 month, though pt admitted with fluid overload    Wt Readings from Last 30 Encounters:   05/16/22 0500 102 kg (224 lb 14.6 oz)   05/15/22 0547 105 kg (231 lb)   05/14/22 0500 105 kg (230 lb 8 oz)   05/13/22 0514 101 kg (223 lb 1.7 oz)   05/12/22 2120 101 kg (223 lb 1.7 oz)   05/12/22 0400 102 kg (223 lb 12.8 oz)   05/11/22 0211 104 kg (228 lb 8 oz)   05/08/22 1053 104 kg (229 lb)   05/06/22 1026 104 kg (229 lb 0.9 oz)   04/12/22 0506 110 kg (242 lb 8.1 oz)   04/09/22 0506 110 kg (243 lb 9.7 oz)   04/08/22 0500 111 kg (244 lb 0.8 oz)   04/08/22 0113 111 kg (244 lb 0.8 oz)   04/07/22 0536 112 kg (247 lb 12.8 oz)   04/06/22 0500 112 kg (246 lb 0.5 oz)   04/05/22 0543 113 kg (248 lb 7.3 oz)   04/04/22 0500 113 kg (248 lb 7.3 oz)   04/03/22 0454 108 kg (238 lb 8.6 oz)   03/31/22 0522 108 kg (238 lb 8.6 oz)   03/30/22 0500 117 kg (257 lb 4.4 oz)   03/30/22 0147 117 kg (257 lb 4.4 oz)   03/29/22 2047 117 kg (257 lb)   03/18/22 0540 117 kg (257 lb 3.2 oz)   03/16/22 0529 116 kg (254 lb 13.6 oz)   03/16/22 0100 116 kg (254 lb 13.6 oz)   03/15/22 0600 115 kg (253 lb 8.5 oz)   03/14/22 0511 120 kg (265 lb 6.9 oz)   03/13/22 0757 115 kg (254 lb)   03/13/22 0321 116 kg (254 lb 13.6 oz)   03/12/22 1910 113 kg (250 lb)   03/09/22 0336 113 kg (249 lb 1.9 oz)   03/08/22 0416 113 kg (249 lb 5.4 oz)   03/07/22 1044 114 kg (251 lb)   02/24/22 0733 95.7 kg (211 lb)   01/20/22 0826 96.1 kg (211 lb 14.9 oz)   09/23/21 1006 106 kg (234 lb)   08/30/21 1647 106 kg (234 lb 9.1 oz)   08/30/21 1147 106 kg (234 lb 5.6 oz)   08/25/21 0532 103 kg (227 lb 1.2 oz)   08/24/21 0641 98.6 kg (217 lb 6 oz)   08/23/21 0649 98.2 kg (216 lb 7.9 oz)   08/21/21 2338 104 kg (229 lb 2.3 oz)   08/21/21 0600 105 kg (231 lb 14.4 oz)   08/20/21 0612 106 kg (234 lb 2.1 oz)   08/18/21 0640 108 kg (238 lb  12.1 oz)   08/16/21 0441 108 kg (238 lb 12.1 oz)   08/14/21 0820 113 kg (248 lb 3.8 oz)   08/12/21 0555 115 kg (254 lb 10.1 oz)   08/11/21 0559 115 kg (253 lb 8.5 oz)   08/10/21 0618 115 kg (253 lb 15.5 oz)   08/09/21 0609 113 kg (249 lb 1.9 oz)   08/08/21 0626 115 kg (253 lb 1.4 oz)   08/07/21 0612 116 kg (256 lb 9.9 oz)   08/05/21 0551 115 kg (253 lb 4.9 oz)   08/04/21 0115 116 kg (256 lb 2.8 oz)   08/03/21 1733 113 kg (250 lb)   08/01/21 0546 125 kg (276 lb 0.3 oz)   07/31/21 0519 124 kg (274 lb 7.6 oz)   07/29/21 0500 118 kg (261 lb 3.9 oz)   07/28/21 0514 117 kg (258 lb 6.1 oz)   07/27/21 0533 117 kg (257 lb 0.9 oz)   07/26/21 1620 115 kg (253 lb 8.5 oz)   07/24/21 0422 115 kg (252 lb 8 oz)   07/23/21 0342 116 kg (254 lb 13.6 oz)   07/22/21 0328 116 kg (256 lb 9.9 oz)   07/21/21 1405 114 kg (252 lb)   07/21/21 0145 114 kg (252 lb 3.3 oz)   07/20/21 2059 114 kg (251 lb)   07/02/21 0300 114 kg (250 lb 10.6 oz)   07/01/21 0344 113 kg (249 lb 9 oz)   06/30/21 0351 113 kg (248 lb 14.4 oz)   06/29/21 0600 113 kg (248 lb 10.9 oz)   06/28/21 0340 114 kg (251 lb 5.2 oz)   06/27/21 0438 111 kg (245 lb 2.4 oz)   06/26/21 0442 116 kg (256 lb 9.9 oz)   06/26/21 0050 116 kg (255 lb 11.7 oz)   06/25/21 1926 113 kg (250 lb)   06/03/21 0500 119 kg (262 lb 12.6 oz)   06/02/21 0506 118 kg (260 lb 2.3 oz)   06/01/21 0528 121 kg (267 lb 13.7 oz)   05/31/21 0500 122 kg (269 lb 6.4 oz)   05/30/21 0500 121 kg (266 lb 8.6 oz)   05/29/21 0500 122 kg (268 lb 15.4 oz)   05/29/21 0305 122 kg (268 lb 15.4 oz)   05/28/21 0504 122 kg (268 lb 8.3 oz)   05/26/21 0449 118 kg (260 lb 12.9 oz)   05/24/21 0323 119 kg (262 lb 9.1 oz)   05/22/21 2100 125 kg (276 lb 3.2 oz)   05/21/21 0349 125 kg (276 lb 7.3 oz)   05/20/21 0439 127 kg (280 lb 6.8 oz)   05/19/21 0202 127 kg (281 lb 1.4 oz)   05/18/21 2219 122 kg (270 lb)   05/08/21 0511 116 kg (256 lb 13.4 oz)   05/07/21 0606 118 kg (261 lb)   05/07/21 0456 118 kg (261 lb 0.4 oz)   05/06/21 2304 118 kg  (261 lb 0.4 oz)   05/06/21 1720 122 kg (270 lb)   03/16/21 1141 125 kg (276 lb)   11/30/20 0557 125 kg (276 lb 7.3 oz)   11/29/20 0615 126 kg (278 lb 7.1 oz)   11/28/20 0635 124 kg (273 lb 5.9 oz)   11/27/20 1611 120 kg (265 lb)   11/27/20 0801 120 kg (265 lb)   11/16/20 0918 120 kg (265 lb)   09/23/20 1047 128 kg (282 lb)   08/24/20 0617 128 kg (282 lb 3 oz)   08/23/20 1212 126 kg (276 lb 14.4 oz)   08/23/20 0932 120 kg (265 lb)   01/23/20 1100 129 kg (284 lb 8 oz)   08/07/19 1413 129 kg (284 lb)   07/26/19 1957 126 kg (278 lb 10.6 oz)   06/26/19 0832 130 kg (285 lb 9.6 oz)   06/22/19 2034 130 kg (287 lb)   06/05/19 1011 129 kg (285 lb)   05/15/19 0850 129 kg (285 lb)   04/24/19 0837 128 kg (283 lb)   04/03/19 0824 127 kg (280 lb)   03/20/19 1106 127 kg (279 lb)   03/06/19 1100 126 kg (278 lb)      BMI kg/m2 Body mass index is 32.27 kg/m².       Labs        Pertinent Labs    Results from last 7 days   Lab Units 05/16/22  0113 05/15/22  0344 05/14/22  0837 05/13/22  0408   SODIUM mmol/L 133* 135* 136 133*   POTASSIUM mmol/L 4.6 4.6 4.7 4.4   CHLORIDE mmol/L 97* 99 100 98   CO2 mmol/L 24.0 24.0 25.0 25.0   BUN mg/dL 16 13 10 16   CREATININE mg/dL 3.11* 2.68* 2.09* 2.90*   CALCIUM mg/dL 8.2* 8.6 8.6 8.2*   BILIRUBIN mg/dL 0.3  --  0.4 0.3   ALK PHOS U/L 55  --  57 49   ALT (SGPT) U/L 7  --  7 6   AST (SGOT) U/L 9  --  20 11   GLUCOSE mg/dL 119* 116* 137* 97     Results from last 7 days   Lab Units 05/16/22  0113 05/15/22  0344 05/14/22  0837 05/13/22  0408 05/12/22  0949   MAGNESIUM mg/dL  --   --  2.0 2.3 2.2   PHOSPHORUS mg/dL 4.0   < > 3.3 3.2  --    HEMOGLOBIN g/dL 10.2*   < > 10.1* 10.4*  --    HEMATOCRIT % 33.6*   < > 33.9* 34.3*  --    TRIGLYCERIDES mg/dL  --   --  87  --   --     < > = values in this interval not displayed.     COVID19   Date Value Ref Range Status   05/10/2022 Not Detected Not Detected - Ref. Range Final     Lab Results   Component Value Date    HGBA1C 6.2 (H) 05/06/2022        Medications     "Scheduled Medications amiodarone, 200 mg, Oral, Q24H  apixaban, 5 mg, Oral, Q12H  aspirin, 81 mg, Oral, Daily  atorvastatin, 40 mg, Oral, Nightly  budesonide, 0.5 mg, Nebulization, BID  cefTRIAXone, 2 g, Intravenous, Q24H  clopidogrel, 75 mg, Oral, Daily  donepezil, 10 mg, Oral, Nightly  insulin lispro, 0-7 Units, Subcutaneous, TID AC  ipratropium-albuterol, 3 mL, Nebulization, TID - RT  levothyroxine, 50 mcg, Oral, Q AM  lidocaine, 20 mL, Intradermal, Once  metoprolol tartrate, 25 mg, Oral, BID  midodrine, 15 mg, Oral, TID AC  pantoprazole, 40 mg, Oral, QAM  polyethylene glycol, 17 g, Oral, BID  sertraline, 50 mg, Oral, Daily  sodium chloride, 10 mL, Intravenous, Q12H  sodium chloride, 10 mL, Intravenous, Q12H  sorbitol, 50 mL, Oral, Daily        Infusions      PRN Medications •  acetaminophen **OR** acetaminophen **OR** acetaminophen  •  acetaminophen  •  aluminum-magnesium hydroxide-simethicone  •  atropine  •  dextrose  •  dextrose  •  glucagon (human recombinant)  •  heparin (porcine)  •  insulin lispro **AND** insulin lispro  •  ipratropium-albuterol  •  melatonin  •  Morphine  •  ondansetron **OR** ondansetron  •  oxyCODONE  •  sodium chloride  •  sodium chloride  •  sodium chloride  •  sodium chloride     Physical Findings        Trending Physical   Appearance, NFPE 5/16: Unable to assess in person on this date     5/9: Pt in dialysis, will visit in person when able   --  Edema  1-2+ edema documented      Bowel Function BM 5/13 (x 3 days)     Tubes None     Chewing/Swallowing No current issues per ST eval     Skin DTPI L heel       Estimated/Assessed Needs    Estimated 5/9/22, continues to be appropriate 5/16/22   Energy Requirements    Height for Calculation  Height: 177.8 cm (70\")   Weight for Calculation    Method for Estimation     EST Needs (kcal/day)        Protein Requirements    Weight for Calculation 75kg, IBW   EST Protein Needs (g/kg) 1.3-1.5g/kg   EST Daily Needs (g/day) 98-113g/day       Fluid " Requirements     Estimated Needs (mL/day) 1mL/kcal       Fluid Deficit    Current Na Level (mEq/L)    Desired Na Level (mEq/L)    Estimated Fluid Deficit (L)       Current Nutrition Orders & Evaluation of Intake       Oral Nutrition     Food Allergies NKFA   Current PO Diet Diet Cardiac, Diabetic/Consistent Carbs; Healthy Heart; Diabetic - Consistent Carb   Supplement    PO Evaluation     Trending % PO Intake 5/16: 33% average PO intakes x last 3 meals documented   5/9: 56% x4 meals     Enteral Nutrition    Enteral Route    TF Modular    TF Delivery Method    Current TF Order    Current Water Flush     TF Residual/Tolerance     TF Observation         Parenteral Nutrition     TPN Route    Current TPN Order    Lipids (mL/%/frequency)     MVI Frequency     Trace Element Frequency     Total # Days on TPN    TPN Observation         Nutrition Course Details    PO Diets: 5/6 [employer]  5/8 CCHO  5/9 Pt currently NPO in anticipation for procedure  5/9 Consistent CHO, Heart Healthy    Nutrition Support:      Nutritional Risk Screening        NRS-2002 Score          Nutrition Diagnosis         Nutrition Dx Problem 1 Increased nutrient needs (protein) related to increased demand for healing and repletion as evidenced by wound and HD.      Nutrition Dx Problem 2        Intervention Goal         Intervention Goal(s) Meal intake at least 75% of meals  Acceptance of ONS     Nutrition Intervention        RD Action Continue to monitor po intakes and supplement acceptance      Nutrition Prescription          Diet Prescription Consistent CHO/Heart Healthy    Supplement Prescription Boost Glucose Control BID     Enteral Prescription        TPN Prescription      Monitor/Evaluation        Monitor PO intake, Supplement intake, Pertinent labs, Weight, Skin status, GI status         Electronically signed by:  Promise Ramirez RD  05/16/22 13:01 EDT

## 2022-05-16 NOTE — PLAN OF CARE
Goal Outcome Evaluation:  Plan of Care Reviewed With: patient        Progress: no change  Outcome Evaluation: Bedside swallow re-evaluation completed with PM established meal. Pt requires full feeding assist from SLP. He reports chest pain, 10/10 with cough s/p CPR yesterday, RN Zainab informed. Utilizing oral suction frequently to avoid coughing and chest pain exacerbation. Pt consumes grilled cheese, pie, soda by straw, salad. Mastication is extended, functional, and similar for both soft and regular solids. No oral residuals. Pharyngeal phase with isolated cough prior to PO. Palpation suggestive of timely initiation. Belching following soda x4, r/t carbonation. Discussed with pt, tolerating regular diet with no additional skilled intervention required at this level of care.        Recommend: continue regular and thin diet. Medications: with thins or puree per pt preference. Compensations: full feed assist, small bites/sips, upright for meals, allow extra time.       SLP to sign off. Pt tolerating regular diet, no additional tx indicated. Please re-consult via new order if difficulties arise.

## 2022-05-16 NOTE — PROGRESS NOTES
Daily Progress Note        Chronic systolic (congestive) heart failure (HCC)    S/P CABG (coronary artery bypass graft)    Primary hypertension    Elevated troponin    ANNETTE treated with BiPAP    Major depressive disorder, recurrent, mild (HCC)    Mixed hyperlipidemia    Flaccid hemiplegia affecting right dominant side ([facility])    Diabetic neuropathy ([facility])    Chronic obstructive pulmonary disease with (acute) exacerbation ([facility])    Anemia of renal disease    End stage renal disease ([facility])    Chronic respiratory failure with hypoxia, on home oxygen therapy ([facility])    Other specified hypothyroidism    Fluid overload    Stage 5 chronic kidney disease on chronic dialysis ([facility])    Bacteremia    Normocytic anemia due to blood loss    Essential (primary) hypertension    Cardiopulmonary arrest with successful resuscitation ([facility])    Ventricular fibrillation ([facility])      Assessment    S/p Code blue was in V-fib. was defibrillated x1.  Patient was given an epi x1, bicarb x2. Upon pulse checks patient was found to be with a pulse and in normal sinus with a left bundle. Patient was moaning and turning his head. He became more and more alert.  Patient did not require intubation.       He continued to oxygenate well with nasal cannula. Within about 5 minutes the patient was asking to sit up and asking for water     Bacteremia with Streptococcus gallolyticus previously called Streptococcus bovis could be associated with GI malignancy  5/7/2020 repeat blood cultures negative    5/13/2022 MARSHA to rule out endocarditis due to Streptococcus Gallolyticus  bacteria  Biatrial and biventricular enlargement.  Diffuse left ventricular hypocontractility with ejection fraction of 25 to 30%.  Mild smoking effect in the left atrium.  Small PFO.  Mild mitral aortic and tricuspid regurgitation is present.  No vegetations       ESRD on HD [id number],   chronic systolic congestive heart failure,   CAD s/p CABG, cardiac stent,   chronic atrial  fibrillation,   CVA with residual right-sided weakness,   COPD on chronic oxygen 3L O2 via NC,   ANNETTE on Cpap,   previous PE,    HTN,   HLD,   type 2 diabetes mellitus complicated by neuropathy,   anemia of renal disease,   vitamin deficiencies,   dementia         Recommendations:     Antibiotics currently on Rocephin     Hemodialysis     BiPAP 12/5 to be used at night and as needed during the day    BP control, midodrine  Amiodarone PO  GI prophylaxis Protonix  Synthroid  Anticoagulation Eliquis    Cardiology planning ICD placement, to discuss with ID regarding timing of placement             LOS: 10 days     Subjective         Objective     Vital signs for last 24 hours:  Vitals:    05/15/22 2122 05/16/22 0217 05/16/22 0500 05/16/22 0508   BP: 101/45 97/48  96/43   BP Location: Left arm Left arm  Left arm   Patient Position: Lying Lying  Lying   Pulse:  68  68   Resp: 14 16  16   Temp: 97.5 °F (36.4 °C) 97 °F (36.1 °C)  97.6 °F (36.4 °C)   TempSrc: Oral Axillary  Oral   SpO2: 100% 100%  100%   Weight:   102 kg (224 lb 14.6 oz)    Height:           Intake/Output last 3 shifts:  I/O last 3 completed shifts:  In: 280 [P.O.:180; IV Piggyback:100]  Out: -   Intake/Output this shift:  I/O this shift:  In: 50 [IV Piggyback:50]  Out: -       Radiology  Imaging Results (Last 24 Hours)     ** No results found for the last 24 hours. **          Labs:  Results from last 7 days   Lab Units 05/16/22  0113   WBC 10*3/mm3 5.80   HEMOGLOBIN g/dL 10.2*   HEMATOCRIT % 33.6*   PLATELETS 10*3/mm3 190     Results from last 7 days   Lab Units 05/16/22  0113   SODIUM mmol/L 133*   POTASSIUM mmol/L 4.6   CHLORIDE mmol/L 97*   CO2 mmol/L 24.0   BUN mg/dL 16   CREATININE mg/dL 3.11*   CALCIUM mg/dL 8.2*   BILIRUBIN mg/dL 0.3   ALK PHOS U/L 55   ALT (SGPT) U/L 7   AST (SGOT) U/L 9   GLUCOSE mg/dL 119*     Results from last 7 days   Lab Units 05/11/22  0253   PH, ARTERIAL pH units 7.362   PO2 ART mm Hg 100.1   PCO2, ARTERIAL mm Hg 47.0   HCO3  ART mmol/L 26.7     Results from last 7 days   Lab Units 05/16/22  0113 05/15/22  0344 05/14/22  0837   ALBUMIN g/dL 3.00* 3.10* 3.10*     Results from last 7 days   Lab Units 05/13/22  0408   TROPONIN T ng/mL 0.583*         Results from last 7 days   Lab Units 05/14/22  0837   MAGNESIUM mg/dL 2.0     Results from last 7 days   Lab Units 05/13/22  0408 05/10/22  0706   INR  1.12* 1.09               Meds:   SCHEDULE  amiodarone, 200 mg, Oral, Q24H  apixaban, 5 mg, Oral, Q12H  aspirin, 81 mg, Oral, Daily  atorvastatin, 40 mg, Oral, Nightly  budesonide, 0.5 mg, Nebulization, BID  cefTRIAXone, 2 g, Intravenous, Q24H  clopidogrel, 75 mg, Oral, Daily  donepezil, 10 mg, Oral, Nightly  insulin lispro, 0-7 Units, Subcutaneous, TID AC  ipratropium-albuterol, 3 mL, Nebulization, TID - RT  levothyroxine, 50 mcg, Oral, Q AM  lidocaine, 20 mL, Intradermal, Once  metoprolol tartrate, 25 mg, Oral, BID  midodrine, 15 mg, Oral, TID AC  pantoprazole, 40 mg, Oral, QAM  polyethylene glycol, 17 g, Oral, BID  sertraline, 50 mg, Oral, Daily  sodium chloride, 10 mL, Intravenous, Q12H  sodium chloride, 10 mL, Intravenous, Q12H  sorbitol, 50 mL, Oral, Daily      Infusions     PRNs  •  acetaminophen **OR** acetaminophen **OR** acetaminophen  •  acetaminophen  •  aluminum-magnesium hydroxide-simethicone  •  atropine  •  dextrose  •  dextrose  •  glucagon (human recombinant)  •  insulin lispro **AND** insulin lispro  •  ipratropium-albuterol  •  melatonin  •  Morphine  •  ondansetron **OR** ondansetron  •  oxyCODONE  •  sodium chloride  •  sodium chloride  •  sodium chloride  •  sodium chloride    Physical Exam:  Physical Exam  Vitals reviewed.   Pulmonary:      Breath sounds: Rales present.   Musculoskeletal:         General: Swelling present.   Skin:     General: Skin is warm and dry.   Neurological:      Mental Status: He is alert.         ROS  Review of Systems   Respiratory: Positive for cough and shortness of breath.    Neurological:  Positive for weakness.       I reviewed the patient's new clinical results    Electronically signed by FABIOLA Bui

## 2022-05-16 NOTE — SIGNIFICANT NOTE
05/16/22 1134   OTHER   Discipline physical therapist   Rehab Time/Intention   Session Not Performed patient unavailable for treatment  (checked on pt at 10:13; gone for HD.)   Recommendation   PT - Next Appointment 05/17/22
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
## 2022-05-16 NOTE — PROGRESS NOTES
RENAL/[facility]:    Name: Benson Mclean  Age: 71 y.o.  : 1950  Sex: male    22    Subjective  S/P HD today    Objective:    Vital Signs  Temp:  [97 °F (36.1 °C)-98.2 °F (36.8 °C)] 97.9 °F (36.6 °C)  Heart Rate:  [65-74] 69  Resp:  [14-20] 18  BP: ()/(43-54) 100/54        Intake/Output Summary (Last 24 hours) at 2022 1432  Last data filed at 2022 1225  Gross per 24 hour   Intake 300 ml   Output 100 ml   Net 200 ml       Physical Exam  Physical Exam  Constitutional:       General: He is not in acute distress.     Appearance: He is not diaphoretic.   HENT:      Head: Normocephalic and atraumatic.      Nose: Nose normal.   Eyes:      Pupils: Pupils are equal, round, and reactive to light.   Neck:      Thyroid: No thyromegaly.      Vascular: No JVD.      Trachea: No tracheal deviation.   Cardiovascular:      Rate and Rhythm: Normal rate and regular rhythm.      Heart sounds: No murmur heard.    No friction rub. No gallop.   Pulmonary:      Effort: Pulmonary effort is normal. No respiratory distress.      Breath sounds: No stridor. No wheezing or rales.   Chest:      Chest wall: No tenderness.   Abdominal:      General: Bowel sounds are normal.      Palpations: Abdomen is soft. There is no mass.      Tenderness: There is no abdominal tenderness. There is no guarding or rebound.      Hernia: No hernia is present.   Musculoskeletal:         General: No tenderness or deformity. Normal range of motion.      Cervical back: Normal range of motion and neck supple.   Skin:     General: Skin is warm and dry.      Coloration: Skin is not pale.      Findings: No rash.   Neurological:      Mental Status: He is alert and oriented to person, place, and time.      Cranial Nerves: No cranial nerve deficit.      Motor: No abnormal muscle tone.      Coordination: Coordination normal.      Deep Tendon Reflexes: Reflexes are normal and symmetric. Reflexes normal.   Psychiatric:         Behavior: Behavior normal.          Thought Content: Thought content normal.         Judgment: Judgment normal.            Results Review:      Results from last 7 days   Lab Units 05/16/22  0113 05/15/22  0344 05/14/22  0837 05/13/22  0408 05/12/22  0501 05/11/22  0555   SODIUM mmol/L 133* 135* 136 133* 133* 133*   CO2 mmol/L 24.0 24.0 25.0 25.0 23.0 24.0   BUN mg/dL 16 13 10 16 12 21   CREATININE mg/dL 3.11* 2.68* 2.09* 2.90* 2.48* 3.41*   CALCIUM mg/dL 8.2* 8.6 8.6 8.2* 8.1* 8.0*   ALBUMIN g/dL 3.00* 3.10* 3.10* 2.90* 3.00* 2.80*   AST (SGOT) U/L 9  --  20 11 12 10   ALT (SGPT) U/L 7  --  7 6 7 8   EGFR mL/min/1.73 20.6* 24.7* 33.2* 22.4* 27.1* 18.5*       Results from last 7 days   Lab Units 05/16/22  0113 05/15/22  0344 05/14/22  0837 05/13/22  0408 05/12/22  0501 05/11/22  0555 05/10/22  0706   WBC 10*3/mm3 5.80 5.40 4.80 6.70 7.20 11.20* 6.70   INR   --   --   --  1.12*  --   --  1.09         Medication Review:   amiodarone, 200 mg, Oral, Q24H  apixaban, 5 mg, Oral, Q12H  aspirin, 81 mg, Oral, Daily  atorvastatin, 40 mg, Oral, Nightly  budesonide, 0.5 mg, Nebulization, BID  cefTRIAXone, 2 g, Intravenous, Q24H  clopidogrel, 75 mg, Oral, Daily  donepezil, 10 mg, Oral, Nightly  insulin lispro, 0-7 Units, Subcutaneous, TID AC  ipratropium-albuterol, 3 mL, Nebulization, TID - RT  levothyroxine, 50 mcg, Oral, Q AM  lidocaine, 20 mL, Intradermal, Once  metoprolol tartrate, 25 mg, Oral, BID  midodrine, 15 mg, Oral, TID AC  pantoprazole, 40 mg, Oral, QAM  polyethylene glycol, 17 g, Oral, BID  sertraline, 50 mg, Oral, Daily  sodium chloride, 10 mL, Intravenous, Q12H  sodium chloride, 10 mL, Intravenous, Q12H  sorbitol, 50 mL, Oral, Daily          Assessment:    End-stage renal disease    Bacteremia    S/P CODE    A-fib - on Amio    SOA    CHF    History of CVA    History of coronary artery disease    Diabetes      Plan:  HD MWF as tolerated  BP support  ABX per ID - keep TDC but will need exchanged if recurrent bacteremia  Will follow      Yony  Jose Bhat MD  05/16/22  14:32 EDT  Tel: [phone]  Fax: [phone]

## 2022-05-16 NOTE — PROGRESS NOTES
[facility] Medicine Services Daily Progress Note    Patient Name: Benson Mclean  : 1950  MRN: [id number]  Primary Care Physician:  Rudolph Rooney MD  Date of admission: 2022      Subjective      Chief Complaint: Fluid overload, incomplete dialysis, bacteremia      Patient Reports he feels sleepy and tired after dialysis.  Blood pressure is low.  Midodrine given.  Antibiotics till May 20 IV.  may need ICD placement after.    ROS negative except as above      Objective      Vitals:   Temp:  [97 °F (36.1 °C)-98.2 °F (36.8 °C)] 98 °F (36.7 °C)  Heart Rate:  [65-74] 69  Resp:  [14-20] 18  BP: ()/(43-54) 110/49  Flow (L/min):  [2] 2    Physical Exam  Vitals reviewed.   Constitutional:       Comments: Frail chronically ill-appearing   HENT:      Head: Normocephalic.   Cardiovascular:      Rate and Rhythm: Normal rate.   Pulmonary:      Effort: Pulmonary effort is normal.      Breath sounds: Rhonchi present.   Abdominal:      General: Abdomen is flat.      Palpations: Abdomen is soft.   Musculoskeletal:         General: Normal range of motion.      Cervical back: Normal range of motion.   Skin:     General: Skin is warm.   Neurological:      General: No focal deficit present.      Mental Status: He is alert and oriented to person, place, and time.   Psychiatric:         Mood and Affect: Mood normal.         Behavior: Behavior normal.             Result Review    Result Review:  I have personally reviewed the results from the time of this admission to 2022 16:54 EDT and agree with these findings:  [x]  Laboratory  []  Microbiology  []  Radiology  []  EKG/Telemetry   []  Cardiology/Vascular   []  Pathology  []  Old records  []  Other:  Most notable findings include: Abnormal renal function in hemodialysis patient    Wounds (last 24 hours)     LDA Wound     Row Name 22 1600 22 1240 22 0800       Wound 22 1706 Right heel    Wound - Properties Group  Placement Date: 05/06/22  -MAGNO Placement Time: 1706  -MAGNO Present on Hospital Admission: Y  -MAGNO Side: Right  -MAGNO Location: heel  -MAGNO    Pressure Injury Stage U  -BREANNE -- U  -BREANNE    Dressing Appearance dry;intact  -BREANNE -- dry;intact  -BREANNE    Closure Adhesive bandage  -BREANNE Adhesive bandage  -BREANNE Adhesive bandage  -BREANNE    Base yellow;slough  -BREANNE yellow;slough  -BREANNE yellow;slough  -BREANNE    Dressing Care -- -- silicone  -BREANNE    Retired Wound - Properties Group Placement Date: 05/06/22  -MAGNO Placement Time: 1706 -MAGNO Present on Hospital Admission: Y  -MAGNO Side: Right  -MAGNO Location: heel  -MAGNO    Retired Wound - Properties Group Date first assessed: 05/06/22  -MAGNO Time first assessed: 1706 -MAGNO Present on Hospital Admission: Y  -MAGNO Side: Right  -MAGNO Location: heel  -MAGNO       Wound 03/30/22 0343 Left heel    Wound - Properties Group Placement Date: 03/30/22  -AS Placement Time: 0343 -AS Present on Hospital Admission: Y  -AS Side: Left  -AS Location: heel  -AS    Pressure Injury Stage DTPI  -BREANNE -- DTPI  -BREANNE    Dressing Appearance dry;intact  -BREANNE -- dry;intact  -BREANNE    Closure Adhesive bandage  -BREANNE Adhesive bandage  -BREANNE Adhesive bandage  -BREANNE    Base non-blanchable;maroon/purple;yellow  -BREANNE non-blanchable;maroon/purple;yellow  -BREANNE non-blanchable;maroon/purple  -BREANNE    Retired Wound - Properties Group Placement Date: 03/30/22  -AS Placement Time: 0343 -AS Present on Hospital Admission: Y  -AS Side: Left  -AS Location: heel  -AS    Retired Wound - Properties Group Date first assessed: 03/30/22  -AS Time first assessed: 0343  -AS Present on Hospital Admission: Y  -AS Side: Left  -AS Location: heel  -AS       Wound 05/06/22 1710 Right anterior greater trochanter    Wound - Properties Group Placement Date: 05/06/22  -MAGNO Placement Time: 1710  -MAGNO Present on Hospital Admission: Y  -MAGNO Side: Right  -MAGON Orientation: anterior  -MAGNO Location: greater trochanter  -MAGNO    Dressing Appearance open to air  -BREANNE -- open to air  -BREANNE    Closure Open to air  -BREANNE Open  to air  -BREANNE Open to air  -BREANNE    Base pink;scab  -BREANNE pink;scab  -BREANNE pink;scab  -BREANNE    Drainage Amount none  -BREANNE none  -BREANNE none  -BREANNE    Retired Wound - Properties Group Placement Date: 05/06/22  -MAGNO Placement Time: 1710 -MAGNO Present on Hospital Admission: Y  -MAGNO Side: Right  -MAGNO Orientation: anterior  -MAGNO Location: greater trochanter  -MAGNO    Retired Wound - Properties Group Date first assessed: 05/06/22  -MAGNO Time first assessed: 1710 -MAGNO Present on Hospital Admission: Y  -MAGNO Side: Right  -MAGNO Location: greater trochanter  -MAGNO       Wound 03/30/22 0341 Left posterior thigh    Wound - Properties Group Placement Date: 03/30/22  -AS Placement Time: 0341  -AS Present on Hospital Admission: Y  -AS Side: Left  -AS Orientation: posterior  -AS Location: thigh  -AS    Pressure Injury Stage -- -- U  -BREANNE    Dressing Appearance -- -- dry;intact  -BREANNE    Closure Open to air  -BREANNE Open to air  -BREANNE Adhesive bandage  -BREANNE    Base dry;pink  -BREANNE dry;white;pink  -BREANNE dry;yellow  -BREANNE    Drainage Amount none  -BREANNE none  -BREANNE none  -BREANNE    Retired Wound - Properties Group Placement Date: 03/30/22  -AS Placement Time: 0341 -AS Present on Hospital Admission: Y  -AS Side: Left  -AS Orientation: posterior  -AS Location: thigh  -AS    Retired Wound - Properties Group Date first assessed: 03/30/22  -AS Time first assessed: 0341  -AS Present on Hospital Admission: Y  -AS Side: Left  -AS Location: thigh  -AS       Wound 03/30/22 0340 perineum Pressure Injury    Wound - Properties Group Placement Date: 03/30/22  -AS Placement Time: 0340  -AS Present on Hospital Admission: Y  -AS Location: perineum  -AS Primary Wound Type: Pressure inj  -AS    Dressing Appearance -- -- open to air  -BREANNE    Closure Open to air  -BREANNE Open to air  -BREANNE Open to air  -BREANNE    Base red;dry;clean  -BREANNE red;dry;clean  -BREANNE red;dry;clean  -BREANNE    Periwound excoriated  -BREANNE excoriated  -BREANNE excoriated  -BREANNE    Drainage Amount none  -BREANNE none  -BREANNE none  -BREANNE    Retired Wound - Properties Group  Placement Date: 03/30/22  -AS Placement Time: 0340  -AS Present on Hospital Admission: Y  -AS Location: perineum  -AS Primary Wound Type: Pressure inj  -AS    Retired Wound - Properties Group Date first assessed: 03/30/22  -AS Time first assessed: 0340  -AS Present on Hospital Admission: Y  -AS Location: perineum  -AS Primary Wound Type: Pressure inj  -AS       Wound 05/11/22 0230 Right posterior hand Skin Tear    Wound - Properties Group Placement Date: 05/11/22  - Placement Time: 0230  - Side: Right  - Orientation: posterior  - Location: hand  - Primary Wound Type: Skin tear  -    Dressing Appearance -- -- dry;intact  -BREANNE    Closure OZIEL  -BREANNE OZIEL  -BREANNE OZIEL  -BREANNE    Base dressing in place, unable to visualize  -BREANNE dressing in place, unable to visualize  -BREANNE dressing in place, unable to visualize  -BREANNE    Retired Wound - Properties Group Placement Date: 05/11/22  - Placement Time: 0230  - Side: Right  - Orientation: posterior  - Location: hand  - Primary Wound Type: Skin tear  -    Retired Wound - Properties Group Date first assessed: 05/11/22  - Time first assessed: 0230  - Side: Right  - Location: hand  - Primary Wound Type: Skin tear  -    Row Name 05/15/22 2000             Wound 05/06/22 1706 Right heel    Wound - Properties Group Placement Date: 05/06/22  -MAGNO Placement Time: 1706 -JP Present on Hospital Admission: Y  -MAGNO Side: Right  -MAGNO Location: heel  -MAGNO      Dressing Appearance dry;intact  -RD      Closure OZIEL  -RD      Base dressing in place, unable to visualize  -RD      Care, Wound pressure removed  heel boots on  -RD      Retired Wound - Properties Group Placement Date: 05/06/22  -MAGNO Placement Time: 1706 -MAGNO Present on Hospital Admission: Y  -MAGNO Side: Right  -MAGNO Location: heel  -MAGON      Retired Wound - Properties Group Date first assessed: 05/06/22  -MAGNO Time first assessed: 1706 -JP Present on Hospital Admission: Y  -MAGNO Side: Right  -MAGNO Location: heel  -MAGNO               Wound 03/30/22 0343 Left heel    Wound - Properties Group Placement Date: 03/30/22  -AS Placement Time: 0343  -AS Present on Hospital Admission: Y  -AS Side: Left  -AS Location: heel  -AS      Dressing Appearance dry;intact  -RD      Closure OZIEL  -RD      Base dressing in place, unable to visualize  -RD      Care, Wound pressure removed  heel boots on  -RD      Retired Wound - Properties Group Placement Date: 03/30/22  -AS Placement Time: 0343  -AS Present on Hospital Admission: Y  -AS Side: Left  -AS Location: heel  -AS      Retired Wound - Properties Group Date first assessed: 03/30/22  -AS Time first assessed: 0343  -AS Present on Hospital Admission: Y  -AS Side: Left  -AS Location: heel  -AS              Wound 05/06/22 1710 Right anterior greater trochanter    Wound - Properties Group Placement Date: 05/06/22  -MAGNO Placement Time: 1710  -MAGNO Present on Hospital Admission: Y  -MAGNO Side: Right  -MAGNO Orientation: anterior  -MAGNO Location: greater trochanter  -MAGNO      Dressing Appearance open to air  -RD      Closure Open to air  -RD      Base pink;scab  -RD      Drainage Amount none  -RD      Care, Wound pressure removed  -RD      Dressing Care skin barrier agent applied  -RD      Periwound Care barrier ointment applied  -RD      Retired Wound - Properties Group Placement Date: 05/06/22  -MAGNO Placement Time: 1710  -MAGNO Present on Hospital Admission: Y  -MAGNO Side: Right  -MAGNO Orientation: anterior  -MAGNO Location: greater trochanter  -MAGNO      Retired Wound - Properties Group Date first assessed: 05/06/22  -MAGNO Time first assessed: 1710  -MAGNO Present on Hospital Admission: Y  -MAGNO Side: Right  -MAGNO Location: greater trochanter  -MAGNO              Wound 03/30/22 0341 Left posterior thigh    Wound - Properties Group Placement Date: 03/30/22  -AS Placement Time: 0341  -AS Present on Hospital Admission: Y  -AS Side: Left  -AS Orientation: posterior  -AS Location: thigh  -AS      Dressing Appearance open to air  -RD      Closure Open to  air  -RD      Base dry;white;pink  -RD      Drainage Amount none  -RD      Dressing Care skin barrier agent applied  -RD      Periwound Care barrier ointment applied  -RD      Retired Wound - Properties Group Placement Date: 03/30/22  -AS Placement Time: 0341  -AS Present on Hospital Admission: Y  -AS Side: Left  -AS Orientation: posterior  -AS Location: thigh  -AS      Retired Wound - Properties Group Date first assessed: 03/30/22  -AS Time first assessed: 0341  -AS Present on Hospital Admission: Y  -AS Side: Left  -AS Location: thigh  -AS              Wound 03/30/22 0340 perineum Pressure Injury    Wound - Properties Group Placement Date: 03/30/22  -AS Placement Time: 0340  -AS Present on Hospital Admission: Y  -AS Location: perineum  -AS Primary Wound Type: Pressure inj  -AS      Dressing Appearance open to air  -RD      Closure Open to air  -RD      Base red;dry;clean  -RD      Periwound excoriated  -RD      Drainage Amount none  -RD      Care, Wound pressure removed  -RD      Dressing Care skin barrier agent applied  -RD      Periwound Care barrier ointment applied  -RD      Retired Wound - Properties Group Placement Date: 03/30/22  -AS Placement Time: 0340  -AS Present on Hospital Admission: Y  -AS Location: perineum  -AS Primary Wound Type: Pressure inj  -AS      Retired Wound - Properties Group Date first assessed: 03/30/22  -AS Time first assessed: 0340  -AS Present on Hospital Admission: Y  -AS Location: perineum  -AS Primary Wound Type: Pressure inj  -AS              Wound 05/11/22 0230 Right posterior hand Skin Tear    Wound - Properties Group Placement Date: 05/11/22  - Placement Time: 0230  - Side: Right  - Orientation: posterior  - Location: hand  - Primary Wound Type: Skin tear  -      Dressing Appearance dry;intact  -RD      Closure OZIEL  -RD      Base dressing in place, unable to visualize  -RD      Retired Wound - Properties Group Placement Date: 05/11/22  - Placement Time: 0230  -  Side: Right  - Orientation: posterior  - Location: hand  - Primary Wound Type: Skin tear  -      Retired Wound - Properties Group Date first assessed: 05/11/22  - Time first assessed: 0230  - Side: Right  - Location: hand  - Primary Wound Type: Skin tear  -            User Key  (r) = Recorded By, (t) = Taken By, (c) = Cosigned By    Initials Name Provider Type    Jessica Isaac, RN Registered Nurse    Zainab Griffiths RN Registered Nurse    Zainab Manuel RN Registered Nurse    AS Maricruz Mi, RN Registered Nurse    Dontrell Ferguson RN Registered Nurse                    Assessment/Plan    71-year-old gentleman with fluid overload and bacteremia     Active Hospital Problems:          Active Hospital Problems     Diagnosis     • **Fluid overload     • Chronic respiratory failure with hypoxia, on home oxygen therapy ([facility])     • Elevated troponin     • Hypothyroidism (acquired)     • End stage renal disease ([facility])     • Anemia of renal disease     • Mixed hyperlipidemia     • Major depressive disorder, recurrent, mild ([facility])     • Flaccid hemiplegia of right dominant side as late effect of cerebral infarction ([facility])     • COPD with acute exacerbation ([facility])     • S/P CABG (coronary artery bypass graft)     • Essential hypertension     • ANNETTE treated with BiPAP        Plan:      Shortness of breath secondary to fluid overload  Chronic respiratory failure with oxygen dependence    -Improving.  -CXR: small right greater than left pleural effusions.  Bibasilar airspace disease, right greater than left, favored to represent atelectasis.   -proBNP >70,000 (previous proBNP march 2022 was also >70,000)       -will recheck in the a.m.  -pt on his home rate of 3L O2 via [place]  -pt did not complete dialysis prior to admission  -nephrology consulted appreciate assistance     Strep bovis bacteremia.  ID is following.  IV antibiotics ordered  Pending culture of dialysis catheter  Pending final  identification  Echo   Appreciate ID assistance  Rocephin until May 20 IV     Chronically elevated troponin  -troponin 0.33 compared to troponin 0.146 on 3/12/2022.   -Will trend      ESRD on HD MWF  -on midodrine for BP support  -nephrology to manage     CVA with right sided residual weakness  -on eliquis, statin       -restart Elliquis in the am.     CAD s/p CABG, PCI  Chronic atrial fibrillation  Hyperlipidemia  -chronic, controlled.   -continue Eliquis, atorvastatin, and metoprolol  -For MARSHA cardiac cath for ablation treatment      DM type 2  -hemoglobin A1c 6.3 on 3/12/2022 and previous admission basal insulin stopped due to hypoglycemia  -SSI AC/HS     Hypothyroidism  -Continue home synthroid     Major depressive disorder, recurrent, mild   Dementia  -Continue home zoloft, aricept     ANNETTE  -cpap qhs      Obesity (BMI 30-39.9)  BMI 36.92  -Lifestyle modifications to reduce cardiovascular risk factors     Dysphagia  Patient complaining about his current diet.  Speech following.    Possible AICD placement after Rocephin completed on May 20 for low ejection fraction     DVT prophylaxis: eliquis      CODE STATUS:    Admission Status:  I believe this patient meets inpatient status.     I discussed the patient's findings and my recommendations with patient.     This patient has been examined wearing appropriate Personal Protective Equipment.   05/16/22      Electronically signed by Sunny Shannon MD, 05/16/22, 16:54 EDT.  [facility] Hospitalist Team

## 2022-05-16 NOTE — CASE MANAGEMENT/SOCIAL WORK
Continued Stay Note  DOREEN Brennan     Patient Name: Benson Mclean  MRN: [id number]  Today's Date: 5/16/2022    Admit Date: 5/6/2022     Discharge Plan     Row Name 05/16/22 1708       Plan    Plan Return to [facility], will return skilled if IV abx still needed (end date for IV abx 5/20). No PASRR required. Precert approved 5/16. Current OP HD at [facility].    Plan Comments Received text message from [employer] that precert approved.  Pt will require IV abx until 5/20, and may need ICD placement after IV abx are finished.                    Expected Discharge Date and Time     Expected Discharge Date Expected Discharge Time    May 21, 2022             Denia Thakkar RN

## 2022-05-16 NOTE — PROGRESS NOTES
Infectious Diseases Progress Note      LOS: 10 days   Patient Care Team:  Rudolph Rooney MD as PCP - General (Family Medicine)  Samantha Zabala APRN as PCP - Family Medicine  Jose G Rm MD as Consulting Physician (Cardiology)    Chief Complaint: Shortness of breath    Subjective       The patient has been afebrile for the last 24 hours.  The patient is hemodynamically stable requiring no vasopressors.  He is currently on 2 L of oxygen via nasal cannula      Review of Systems:   Review of Systems   Constitutional: Positive for fatigue.   HENT: Negative.    Eyes: Negative.    Respiratory: Positive for shortness of breath.    Cardiovascular: Negative.    Gastrointestinal: Negative.    Endocrine: Negative.    Genitourinary: Negative.    Musculoskeletal: Negative.    Skin: Negative.    Neurological: Positive for weakness.   Psychiatric/Behavioral: Negative.    All other systems reviewed and are negative.       Objective     Vital Signs  Temp:  [97 °F (36.1 °C)-98 °F (36.7 °C)] 98 °F (36.7 °C)  Heart Rate:  [65-74] 69  Resp:  [14-20] 18  BP: ()/(43-54) 110/49    Physical Exam:  Physical Exam  Vitals and nursing note reviewed.   Constitutional:       General: He is not in acute distress.     Appearance: Normal appearance. He is well-developed and normal weight. He is ill-appearing. He is not diaphoretic.   HENT:      Head: Normocephalic and atraumatic.   Eyes:      Conjunctiva/sclera: Conjunctivae normal.      Pupils: Pupils are equal, round, and reactive to light.   Cardiovascular:      Rate and Rhythm: Normal rate and regular rhythm.      Heart sounds: Normal heart sounds, S1 normal and S2 normal.   Pulmonary:      Effort: Pulmonary effort is normal. No respiratory distress.      Breath sounds: No stridor. Rales present. No wheezing.   Abdominal:      General: Bowel sounds are normal. There is no distension.      Palpations: Abdomen is soft. There is no mass.      Tenderness: There is no abdominal  tenderness. There is no guarding.   Musculoskeletal:         General: No deformity. Normal range of motion.      Cervical back: Neck supple.   Skin:     General: Skin is warm and dry.      Coloration: Skin is not pale.      Findings: No erythema or rash.   Neurological:      Mental Status: He is alert and oriented to person, place, and time.      Cranial Nerves: No cranial nerve deficit.      Comments: Generalized weakness   Psychiatric:         Mood and Affect: Mood normal.          Results Review:    I have reviewed all clinical data, test, lab, and imaging results.     Radiology  No Radiology Exams Resulted Within Past 24 Hours    Cardiology    Laboratory    Results from last 7 days   Lab Units 05/16/22  0113 05/15/22  0344 05/14/22  0837 05/13/22  0408 05/12/22  0501 05/11/22  0555 05/11/22  0253 05/10/22  0706   WBC 10*3/mm3 5.80 5.40 4.80 6.70 7.20 11.20*  --  6.70   HEMOGLOBIN g/dL 10.2* 10.2* 10.1* 10.4* 10.3* 10.5*  --  11.9*   HEMOGLOBIN, POC g/dL  --   --   --   --   --   --  13.4  --    HEMATOCRIT % 33.6* 33.0* 33.9* 34.3* 32.7* 34.9*  --  39.4   HEMATOCRIT POC %  --   --   --   --   --   --  39  --    PLATELETS 10*3/mm3 190 188 185 211 208 234  --  213     Results from last 7 days   Lab Units 05/16/22  0113 05/15/22  0344 05/14/22  0837 05/13/22  0408 05/12/22  0501 05/11/22  0555 05/10/22  0706   SODIUM mmol/L 133* 135* 136 133* 133* 133* 135*   POTASSIUM mmol/L 4.6 4.6 4.7 4.4 4.2 3.2* 4.1   CHLORIDE mmol/L 97* 99 100 98 100 97* 97*   CO2 mmol/L 24.0 24.0 25.0 25.0 23.0 24.0 24.0   BUN mg/dL 16 13 10 16 12 21 17   CREATININE mg/dL 3.11* 2.68* 2.09* 2.90* 2.48* 3.41* 2.86*   GLUCOSE mg/dL 119* 116* 137* 97 122* 167* 132*   ALBUMIN g/dL 3.00* 3.10* 3.10* 2.90* 3.00* 2.80* 2.90*   BILIRUBIN mg/dL 0.3  --  0.4 0.3 0.3 0.3 0.4   ALK PHOS U/L 55  --  57 49 45 46 56   AST (SGOT) U/L 9  --  20 11 12 10 12   ALT (SGPT) U/L 7  --  7 6 7 8 6   CALCIUM mg/dL 8.2* 8.6 8.6 8.2* 8.1* 8.0* 8.7                  Microbiology   Microbiology Results (last 10 days)     Procedure Component Value - Date/Time    MRSA Screen, PCR (Inpatient) - Swab, Nares [[id number]]  (Normal) Collected: 05/11/22 0557    Lab Status: Final result Specimen: Swab from Nares Updated: 05/11/22 0804     MRSA PCR No MRSA Detected    COVID PRE-OP / PRE-PROCEDURE SCREENING ORDER (NO ISOLATION) - Swab, Nasopharynx [[id number]]  (Normal) Collected: 05/10/22 0635    Lab Status: Final result Specimen: Swab from Nasopharynx Updated: 05/10/22 1143    Narrative:      The following orders were created for panel order COVID PRE-OP / PRE-PROCEDURE SCREENING ORDER (NO ISOLATION) - Swab, Nasopharynx.  Procedure                               Abnormality         Status                     ---------                               -----------         ------                     COVID-19,CEPHEID/ROHAN,CO...[[id number]]  Normal              Final result                 Please view results for these tests on the individual orders.    COVID-19,CEPHEID/ROHAN,COR/ZEYNEP/PAD/BEATRICE IN-HOUSE(OR EMERGENT/ADD-ON),NP SWAB IN TRANSPORT MEDIA 3-4 HR TAT, RT-PCR - Swab, Nasopharynx [[id number]]  (Normal) Collected: 05/10/22 0635    Lab Status: Final result Specimen: Swab from Nasopharynx Updated: 05/10/22 1143     COVID19 Not Detected    Narrative:      Fact sheet for providers: https://www.fda.gov/media/756875/download     Fact sheet for patients: https://www.fda.gov/media/930376/download  Fact sheet for providers: https://www.fda.gov/media/049119/download     Fact sheet for patients: https://www.fda.gov/media/488023/download    Blood Culture - Blood, Hand, Right [[id number]]  (Normal) Collected: 05/07/22 1519    Lab Status: Final result Specimen: Blood from Hand, Right Updated: 05/12/22 1617     Blood Culture No growth at 5 days    CANDIDA AURIS SCREEN - Swab, Axilla Right, Axilla Left and Groin [[id number]]  (Normal) Collected: 05/07/22 1020    Lab Status: Final result Specimen: Swab from  Axilla Right, Axilla Left and Groin Updated: 05/12/22 0739     Candida Auris Screen Culture No Candida auris isolated at 5 days          Medication Review:       Schedule Meds  amiodarone, 200 mg, Oral, Q24H  apixaban, 5 mg, Oral, Q12H  aspirin, 81 mg, Oral, Daily  atorvastatin, 40 mg, Oral, Nightly  budesonide, 0.5 mg, Nebulization, BID  cefTRIAXone, 2 g, Intravenous, Q24H  clopidogrel, 75 mg, Oral, Daily  donepezil, 10 mg, Oral, Nightly  insulin lispro, 0-7 Units, Subcutaneous, TID AC  ipratropium-albuterol, 3 mL, Nebulization, TID - RT  levothyroxine, 50 mcg, Oral, Q AM  lidocaine, 20 mL, Intradermal, Once  metoprolol tartrate, 25 mg, Oral, BID  midodrine, 15 mg, Oral, TID AC  pantoprazole, 40 mg, Oral, QAM  polyethylene glycol, 17 g, Oral, BID  sertraline, 50 mg, Oral, Daily  sodium chloride, 10 mL, Intravenous, Q12H  sodium chloride, 10 mL, Intravenous, Q12H  sorbitol, 50 mL, Oral, Daily        Infusion Meds       PRN Meds  •  acetaminophen **OR** acetaminophen **OR** acetaminophen  •  acetaminophen  •  aluminum-magnesium hydroxide-simethicone  •  atropine  •  dextrose  •  dextrose  •  glucagon (human recombinant)  •  heparin (porcine)  •  insulin lispro **AND** insulin lispro  •  ipratropium-albuterol  •  melatonin  •  Morphine  •  ondansetron **OR** ondansetron  •  sodium chloride  •  sodium chloride  •  sodium chloride  •  sodium chloride        Assessment/Plan       Antimicrobial Therapy   1.  IV ceftriaxone        2.        3.        4.        5.            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  We need to rule out endocarditis or GI malignancy  Colonoscopy was done on May 10, 2022 and found to have polyps and biopsies are pending  -MARSHA was negative for vegetation  -Blood culture from 5/7/2022 is negative     The patient presented hospital with shortness of breath and chest x-ray showed density at the right base.  Need to rule out  pneumonia  -CT showed some large bilateral pleural effusions but no obvious pneumonia which most likely secondary to volume overload  -Patient is currently on 2 L of oxygen by cannula     End-stage renal disease on hemodialysis.  Patient had right chest tunneled dialysis catheter and left arm AV fistula     History of CVA    S/p cardiac arrest with V. fib rhythm resuscitated successfully on May 11, 2022  -Patient had a heart catheterization with stent to the LAD 5/13/2020 we have reviewed the patinet's labs, notes, and imagining studies for today. We are keeping the same antimicrobial therapy at this time.     Plan     Continue ceftriaxone 2 g IV daily for 2 weeks from the last negative blood culture on 5/7/2022-last day on 5/20/2022  Cardiology is considering placing a ICD-can be done anytime at this point  Supportive care  A.m. labs    The above note was transcribed for Dr. Jack-physical exam and review of systems were performed by him    Ericka Valencia, APRN  05/16/22  17:47 EDT    Note is dictated utilizing voice recognition software/Dragon

## 2022-05-16 NOTE — PROGRESS NOTES
Referring Provider: Sunny Shannon MD    Reason for follow-up:  Shortness of breath  Recent ventricular fibrillation.  Bacteremia  Status post stent       Patient Care Team:  Rudolph Rooney MD as PCP - General (Family Medicine)  Samantha Zabala APRN as PCP - Family Medicine  Jose G Rm MD as Consulting Physician (Cardiology)    Subjective .  Feeling better  ROS  Mild confusion.  Since I have last seen him yesterday, the patient has been without any chest discomfort ,shortness of breath, palpitations, dizziness or syncope.  Denies having any headache ,abdominal pain ,nausea, vomiting , diarrhea constipation, loss of weight or loss of appetite.  Denies having any excessive bruising ,hematuria or blood in the stool.    Review of all systems negative except as indicated    History  Past Medical History:   Diagnosis Date   • A-fib ([facility]) 8/3/2021   • Anemia    • Appetite loss    • Arthritis    • Brain bleed ([facility])    • Carpal tunnel syndrome on left    • CHF (congestive heart failure) ([facility])    • Chronic left-sided low back pain without sciatica 12/27/2017   • CKD (chronic kidney disease) stage 3, GFR 30-59 ml/min ([facility])    • Closed fracture of seventh thoracic vertebra ([facility]) 8/23/2020   • Cognitive communication deficit 12/1/2020   • COPD (chronic obstructive pulmonary disease) ([facility])    • Coronary artery disease    • COVID-19 2/19/2021   • Cytokine release syndrome, grade 1 5/24/2021   • Depression    • Diabetic neuropathy ([facility])    • Dialysis patient ([facility])     mon wed fri   • DM2 (diabetes mellitus, type 2) ([facility])    • GERD (gastroesophageal reflux disease)    • Hyperlipidemia    • Hypertension    • Hypogonadism in male    • Neuropathy    • Nonsustained ventricular tachycardia ([facility]) 7/23/2021   • Obesity    • Paroxysmal atrial fibrillation ([facility]) 12/1/2020   • Sleep apnea    • Stroke ([facility])    • Unsteady gait        Past Surgical History:   Procedure Laterality Date   • ANGIOPLASTY      X2   • APPENDECTOMY     •  ARTERIOVENOUS FISTULA/SHUNT SURGERY Left 1/20/2022    Procedure: LEFT BRACHIAL CEPHALIC ARTERIAL VENOUS FISTULA CREATION;  Surgeon: Yony Davila MD;  Location: [facility] MAIN OR;  Service: Vascular;  Laterality: Left;   • CARDIAC CATHETERIZATION  11/13/2015   • CARDIAC CATHETERIZATION N/A 5/24/2021    Procedure: Left Heart Cath and coronary angiogram;  Surgeon: Jose G Rm MD;  Location:  [facility] CATH INVASIVE LOCATION;  Service: Cardiovascular;  Laterality: N/A;   • CARDIAC CATHETERIZATION  5/24/2021    Procedure: Saphenous Vein Graft;  Surgeon: Jose G Rm MD;  Location:  [facility] CATH INVASIVE LOCATION;  Service: Cardiovascular;;   • CARDIAC CATHETERIZATION N/A 5/24/2021    Procedure: Percutaneous Coronary Intervention;  Surgeon: Bernabe Zambrano MD;  Location:  [facility] CATH INVASIVE LOCATION;  Service: Cardiology;  Laterality: N/A;   • CARDIAC CATHETERIZATION N/A 5/24/2021    Procedure: Stent NIELS coronary;  Surgeon: Bernabe Zambrano MD;  Location:  [facility] CATH INVASIVE LOCATION;  Service: Cardiology;  Laterality: N/A;   • CARDIAC CATHETERIZATION N/A 5/26/2021    Procedure: Percutaneous Coronary Intervention;  Surgeon: Bernabe Zambrano MD;  Location:  [facility] CATH INVASIVE LOCATION;  Service: Cardiovascular;  Laterality: N/A;   • CARDIAC CATHETERIZATION N/A 5/26/2021    Procedure: Stent NIELS bypass graft;  Surgeon: Bernabe Zambrano MD;  Location:  [facility] CATH INVASIVE LOCATION;  Service: Cardiovascular;  Laterality: N/A;   • CARDIAC ELECTROPHYSIOLOGY PROCEDURE N/A 5/24/2021    Procedure: Temporary Pacemaker;  Surgeon: Bernabe Zambrano MD;  Location:  [facility] CATH INVASIVE LOCATION;  Service: Cardiology;  Laterality: N/A;   • CARDIAC ELECTROPHYSIOLOGY PROCEDURE N/A 5/26/2021    Procedure: Temporary Pacemaker;  Surgeon: Bernabe Zambrano MD;  Location: [facility] CATH INVASIVE LOCATION;  Service: Cardiovascular;  Laterality: N/A;   • CARPAL TUNNEL RELEASE Left 04/29/2018    carpal tunnel- lt hand// other hand surgeries    •  CATARACT EXTRACTION, BILATERAL  2002    Dr. Lux Acosta   • CHOLECYSTECTOMY     • COLON RESECTION  2005   • COLONOSCOPY N/A 5/10/2022    Procedure: COLONOSCOPY with polypectomy x3;  Surgeon: Herbie Keane MD;  Location: [facility] ENDOSCOPY;  Service: Gastroenterology;  Laterality: N/A;  colon polyps;internal hemorrhoids   • CORONARY ANGIOPLASTY     • CORONARY ANGIOPLASTY WITH STENT PLACEMENT  11/13/2015    PTCA stent to proximal in stent and mid to distal lad   • CORONARY ANGIOPLASTY WITH STENT PLACEMENT  09/16/2016    PTCA stent to mid lad and stent to vein graft to marginal   • CORONARY ARTERY BYPASS GRAFT  2005    @ [facility]   • CYST REMOVAL      cyst removed from scrotum   • FOOT SURGERY Right 07/17/2018   • FOOT SURGERY Left 02/04/2019   • PORTACATH PLACEMENT     • TOE AMPUTATION Right     right toe removed D/T infected cut that went to the bone       Family History   Problem Relation Age of Onset   • Heart disease Mother    • Heart disease Father    • Diabetes Sister    • Heart disease Sister    • Diabetes Brother    • Mental illness Brother        Social History     Tobacco Use   • Smoking status: Former Smoker     Packs/day: 1.00     Years: 60.00     Pack years: 60.00     Types: Cigarettes   • Smokeless tobacco: Never Used   • Tobacco comment: Patient quit smoking 11/2020   Vaping Use   • Vaping Use: Never used   Substance Use Topics   • Alcohol use: No   • Drug use: Yes     Frequency: 1.0 times per week     Types: Marijuana     Comment: socially        Medications Prior to Admission   Medication Sig Dispense Refill Last Dose   • albuterol sulfate  (90 Base) MCG/ACT inhaler Inhale 2 puffs Every 4 (Four) Hours.   5/6/2022 at Unknown time   • apixaban (ELIQUIS) 5 MG tablet tablet Take 1 tablet by mouth Every 12 (Twelve) Hours. Indications: Atrial Fibrillation 60 tablet  5/6/2022 at Unknown time   • atorvastatin (LIPITOR) 40 MG tablet Take 40 mg by mouth Every Night.   5/5/2022 at Unknown time   • budesonide  (Pulmicort) 0.5 MG/2ML nebulizer solution Take 2 mL by nebulization 2 (Two) Times a Day.   5/6/2022 at Unknown time   • cholecalciferol (VITAMIN D3) 25 MCG (1000 UT) tablet Take 1,000 Units by mouth Daily.   5/6/2022 at Unknown time   • docusate sodium (COLACE) 100 MG capsule Take 100 mg by mouth Daily.   5/6/2022 at Unknown time   • donepezil (ARICEPT) 10 MG tablet Take 10 mg by mouth Every Night.   5/5/2022 at Unknown time   • fluticasone (FLONASE) 50 MCG/ACT nasal spray 2 sprays by Each Nare route 2 (Two) Times a Day.   5/6/2022 at Unknown time   • guaiFENesin (MUCINEX) 600 MG 12 hr tablet Take 600 mg by mouth 2 (Two) Times a Day.   5/6/2022 at Unknown time   • HYDROcodone-acetaminophen (NORCO) 5-325 MG per tablet Take 1 tablet by mouth 3 (Three) Times a Day.   5/6/2022 at Unknown time   • ipratropium-albuterol (DUO-NEB) 0.5-2.5 mg/3 ml nebulizer Take 3 mL by nebulization 3 (Three) Times a Day. 360 mL  5/6/2022 at Unknown time   • ipratropium-albuterol (DUO-NEB) 0.5-2.5 mg/3 ml nebulizer Take 3 mL by nebulization Every 4 (Four) Hours As Needed for Wheezing. 360 mL  5/6/2022 at Unknown time   • levothyroxine (SYNTHROID, LEVOTHROID) 50 MCG tablet Take 50 mcg by mouth Every Morning.   5/6/2022 at Unknown time   • Metoprolol Tartrate 37.5 MG tablet Take 37.5 mg by mouth 2 (Two) Times a Day. Hold for SBP <110 or HR < 60   5/6/2022 at Unknown time   • midodrine (PROAMATINE) 10 MG tablet Take 10 mg by mouth 3 (Three) Times a Day Before Meals. Hold for BP > 140/90   5/6/2022 at Unknown time   • omeprazole (priLOSEC) 20 MG capsule Take 20 mg by mouth Daily.   5/6/2022 at Unknown time   • polyethylene glycol (MiraLax) 17 g packet Take 17 g by mouth 2 (Two) Times a Day.   5/6/2022 at Unknown time   • sertraline (ZOLOFT) 50 MG tablet Take 50 mg by mouth Daily.   5/6/2022 at Unknown time   • vitamin B-12 (CYANOCOBALAMIN) 1000 MCG tablet Take 1,000 mcg by mouth Daily.   5/6/2022 at Unknown time   • bisacodyl (DULCOLAX) 10 MG  suppository Insert 10 mg into the rectum Daily As Needed for Constipation.   Unknown at Unknown time   • magnesium hydroxide (MILK OF MAGNESIA) 400 MG/5ML suspension Take 30 mL by mouth Daily As Needed for Constipation.   Unknown at Unknown time   • ondansetron (ZOFRAN) 4 MG tablet Take 4 mg by mouth Every 8 (Eight) Hours As Needed for Nausea or Vomiting.   Unknown at Unknown time   • phenylephrine-mineral oil-petrolatum (Preparation H) 0.25-14-74.9 % ointment hemorrhoidal ointment Insert 1 application into the rectum Daily As Needed.   Unknown at Unknown time       Allergies  Codeine    Scheduled Meds:amiodarone, 200 mg, Oral, Q24H  apixaban, 5 mg, Oral, Q12H  aspirin, 81 mg, Oral, Daily  atorvastatin, 40 mg, Oral, Nightly  budesonide, 0.5 mg, Nebulization, BID  cefTRIAXone, 2 g, Intravenous, Q24H  clopidogrel, 75 mg, Oral, Daily  donepezil, 10 mg, Oral, Nightly  insulin lispro, 0-7 Units, Subcutaneous, TID AC  ipratropium-albuterol, 3 mL, Nebulization, TID - RT  levothyroxine, 50 mcg, Oral, Q AM  lidocaine, 20 mL, Intradermal, Once  metoprolol tartrate, 25 mg, Oral, BID  midodrine, 15 mg, Oral, TID AC  pantoprazole, 40 mg, Oral, QAM  polyethylene glycol, 17 g, Oral, BID  sertraline, 50 mg, Oral, Daily  sodium chloride, 10 mL, Intravenous, Q12H  sodium chloride, 10 mL, Intravenous, Q12H  sorbitol, 50 mL, Oral, Daily      Continuous Infusions:   PRN Meds:.•  acetaminophen **OR** acetaminophen **OR** acetaminophen  •  acetaminophen  •  aluminum-magnesium hydroxide-simethicone  •  atropine  •  dextrose  •  dextrose  •  glucagon (human recombinant)  •  insulin lispro **AND** insulin lispro  •  ipratropium-albuterol  •  melatonin  •  Morphine  •  ondansetron **OR** ondansetron  •  oxyCODONE  •  sodium chloride  •  sodium chloride  •  sodium chloride  •  sodium chloride    Objective     VITAL SIGNS  Vitals:    05/15/22 2018 05/15/22 2122 05/16/22 0217 05/16/22 0508   BP: 99/50 101/45 97/48 96/43   BP Location: Left arm  "Left arm Left arm Left arm   Patient Position: Lying Lying Lying Lying   Pulse:   68 68   Resp:  14 16 16   Temp:  97.5 °F (36.4 °C) 97 °F (36.1 °C) 97.6 °F (36.4 °C)   TempSrc:  Oral Axillary Oral   SpO2:  100% 100% 100%   Weight:       Height:           Flowsheet Rows    Flowsheet Row First Filed Value   Admission Height 177.8 cm (70\") Documented at 05/06/2022 1026   Admission Weight 104 kg (229 lb 0.9 oz) Documented at 05/06/2022 1026            Intake/Output Summary (Last 24 hours) at 5/16/2022 0620  Last data filed at 5/16/2022 0351  Gross per 24 hour   Intake 230 ml   Output --   Net 230 ml        TELEMETRY: Atrial fibrillation    Physical Exam:  The patient is alert, oriented and in no distress.  Mild confusion.  Vital signs as noted above.  Exogenous obesity.  Head and neck revealed no carotid bruits or jugular venous distention.  No thyromegaly or lymphadenopathy is present  Lungs clear.  No wheezing.  Breath sounds are normal bilaterally.  Heart normal first and second heart sounds.  No murmur. No precordial rub is present.  No gallop is present.  Abdomen soft and nontender.  No organomegaly is present.  Extremities with good peripheral pulses without any pedal edema.  Cardiac cath site looks normal.  Skin warm and dry.  Musculoskeletal system is grossly normal  CNS grossly normal except for mild confusion      Results Review:   I reviewed the patient's new clinical results.  Lab Results (last 24 hours)     Procedure Component Value Units Date/Time    Comprehensive Metabolic Panel [[id number]]  (Abnormal) Collected: 05/16/22 0113    Specimen: Blood Updated: 05/16/22 0139     Glucose 119 mg/dL      BUN 16 mg/dL      Creatinine 3.11 mg/dL      Sodium 133 mmol/L      Potassium 4.6 mmol/L      Chloride 97 mmol/L      CO2 24.0 mmol/L      Calcium 8.2 mg/dL      Total Protein 5.8 g/dL      Albumin 3.00 g/dL      ALT (SGPT) 7 U/L      AST (SGOT) 9 U/L      Alkaline Phosphatase 55 U/L      Total Bilirubin 0.3 mg/dL "      Globulin 2.8 gm/dL      A/G Ratio 1.1 g/dL      BUN/Creatinine Ratio 5.1     Anion Gap 12.0 mmol/L      eGFR 20.6 mL/min/1.73      Comment: National Kidney Foundation and American Society of Nephrology (ASN) Task Force recommended calculation based on the Chronic Kidney Disease Epidemiology Collaboration (CKD-EPI) equation refit without adjustment for race.       Narrative:      GFR Normal >60  Chronic Kidney Disease <60  Kidney Failure <15      Phosphorus [[id number]]  (Normal) Collected: 05/16/22 0113    Specimen: Blood Updated: 05/16/22 0139     Phosphorus 4.0 mg/dL     CBC & Differential [[id number]]  (Abnormal) Collected: 05/16/22 0113    Specimen: Blood Updated: 05/16/22 0120    Narrative:      The following orders were created for panel order CBC & Differential.  Procedure                               Abnormality         Status                     ---------                               -----------         ------                     CBC Auto Differential[[id number]]        Abnormal            Final result                 Please view results for these tests on the individual orders.    CBC Auto Differential [[id number]]  (Abnormal) Collected: 05/16/22 0113    Specimen: Blood Updated: 05/16/22 0120     WBC 5.80 10*3/mm3      RBC 3.49 10*6/mm3      Hemoglobin 10.2 g/dL      Hematocrit 33.6 %      MCV 96.4 fL      MCH 29.1 pg      MCHC 30.2 g/dL      RDW 18.0 %      RDW-SD 62.1 fl      MPV 7.6 fL      Platelets 190 10*3/mm3      Neutrophil % 66.8 %      Lymphocyte % 20.6 %      Monocyte % 9.4 %      Eosinophil % 1.5 %      Basophil % 1.7 %      Neutrophils, Absolute 3.80 10*3/mm3      Lymphocytes, Absolute 1.20 10*3/mm3      Monocytes, Absolute 0.50 10*3/mm3      Eosinophils, Absolute 0.10 10*3/mm3      Basophils, Absolute 0.10 10*3/mm3      nRBC 0.1 /100 WBC     POC Glucose Once [[id number]]  (Abnormal) Collected: 05/15/22 2005    Specimen: Blood Updated: 05/15/22 2006     Glucose 111 mg/dL      Comment: Serial  Number: [id number]:  [id number]       POC Glucose Once [[id number]]  (Abnormal) Collected: 05/15/22 1142    Specimen: Blood Updated: 05/15/22 1143     Glucose 69 mg/dL      Comment: Serial Number: [id number]:  [id number]       POC Glucose Once [[id number]]  (Normal) Collected: 05/15/22 0836    Specimen: Blood Updated: 05/15/22 0838     Glucose 85 mg/dL      Comment: Serial Number: [id number]:  [id number]       POC Glucose Once [[id number]]  (Normal) Collected: 05/15/22 0755    Specimen: Blood Updated: 05/15/22 0811     Glucose 79 mg/dL      Comment: Serial Number: [id number]:  [id number]       POC Glucose Once [[id number]]  (Abnormal) Collected: 05/15/22 0704    Specimen: Blood Updated: 05/15/22 0710     Glucose 57 mg/dL      Comment: Serial Number: [id number]:  [id number]             Imaging Results (Last 24 Hours)     ** No results found for the last 24 hours. **      LAB RESULTS (LAST 7 DAYS)    CBC  Results from last 7 days   Lab Units 05/16/22  0113 05/15/22  0344 05/14/22  0837 05/13/22  0408 05/12/22  0501 05/11/22  0555 05/11/22  0253 05/10/22  0706   WBC 10*3/mm3 5.80 5.40 4.80 6.70 7.20 11.20*  --  6.70   RBC 10*6/mm3 3.49* 3.45* 3.54* 3.58* 3.47* 3.71*  --  4.14   HEMOGLOBIN g/dL 10.2* 10.2* 10.1* 10.4* 10.3* 10.5*  --  11.9*   HEMOGLOBIN, POC g/dL  --   --   --   --   --   --  13.4  --    HEMATOCRIT % 33.6* 33.0* 33.9* 34.3* 32.7* 34.9*  --  39.4   HEMATOCRIT POC %  --   --   --   --   --   --  39  --    MCV fL 96.4 95.8 95.8 96.0 94.2 94.0  --  95.2   PLATELETS 10*3/mm3 190 188 185 211 208 234  --  213       BMP  Results from last 7 days   Lab Units 05/16/22  0113 05/15/22  0344 05/14/22  0837 05/13/22  0408 05/12/22  0949 05/12/22  0501 05/11/22  0555 05/10/22  0706   SODIUM mmol/L 133* 135* 136 133*  --  133* 133* 135*   POTASSIUM mmol/L 4.6 4.6 4.7 4.4  --  4.2 3.2* 4.1   CHLORIDE mmol/L 97* 99 100 98  --  100 97* 97*   CO2 mmol/L 24.0 24.0 25.0 25.0  --  23.0 24.0 24.0    BUN mg/dL 16 13 10 16  --  12 21 17   CREATININE mg/dL 3.11* 2.68* 2.09* 2.90*  --  2.48* 3.41* 2.86*   GLUCOSE mg/dL 119* 116* 137* 97  --  122* 167* 132*   MAGNESIUM mg/dL  --   --  2.0 2.3 2.2 1.9 2.1  --    PHOSPHORUS mg/dL 4.0 3.8 3.3 3.2  --  2.0* 2.7  --        CMP   Results from last 7 days   Lab Units 05/16/22  0113 05/15/22  0344 05/14/22  0837 05/13/22  0408 05/12/22  0501 05/11/22  0555 05/10/22  0706   SODIUM mmol/L 133* 135* 136 133* 133* 133* 135*   POTASSIUM mmol/L 4.6 4.6 4.7 4.4 4.2 3.2* 4.1   CHLORIDE mmol/L 97* 99 100 98 100 97* 97*   CO2 mmol/L 24.0 24.0 25.0 25.0 23.0 24.0 24.0   BUN mg/dL 16 13 10 16 12 21 17   CREATININE mg/dL 3.11* 2.68* 2.09* 2.90* 2.48* 3.41* 2.86*   GLUCOSE mg/dL 119* 116* 137* 97 122* 167* 132*   ALBUMIN g/dL 3.00* 3.10* 3.10* 2.90* 3.00* 2.80* 2.90*   BILIRUBIN mg/dL 0.3  --  0.4 0.3 0.3 0.3 0.4   ALK PHOS U/L 55  --  57 49 45 46 56   AST (SGOT) U/L 9  --  20 11 12 10 12   ALT (SGPT) U/L 7  --  7 6 7 8 6         BNP        TROPONIN  Results from last 7 days   Lab Units 05/13/22 0408   TROPONIN T ng/mL 0.583*       CoAg  Results from last 7 days   Lab Units 05/13/22  0408 05/10/22  0706   INR  1.12* 1.09       Creatinine Clearance  Estimated Creatinine Clearance: 26.4 mL/min (A) (by C-G formula based on SCr of 3.11 mg/dL (H)).    ABG  Results from last 7 days   Lab Units 05/11/22  0253   PH, ARTERIAL pH units 7.362   PCO2, ARTERIAL mm Hg 47.0   PO2 ART mm Hg 100.1   O2 SATURATION ART % 97.4   BASE EXCESS ART mmol/L 0.7       Radiology  XR Chest 1 View    Result Date: 5/15/2022  1. Persistent cardiomegaly with vascular congestion, interstitial edema and bilateral pleural effusions. The appearance is similar to the prior study. Slot 63 Electronically signed by:  Oleg Villatoro M.D.  5/14/2022 11:13 PM          EKG                      I personally viewed and interpreted the patient's EKG/Telemetry data: Atrial fibrillation    ECHOCARDIOGRAM:    Results for orders placed  during the hospital encounter of 05/06/22    Adult Transesophageal Echo (MARSHA) W/ Cont if Necessary Per Protocol    Interpretation Summary  Date of study  5/13/2022    Indications  Bacteremia  Assessment for bacterial endocarditis    Procedure  Anesthesia was provided by anesthesiologist with intravenous Diprivan.  MARSHA probe could be passed without difficulty.  Patient tolerated the procedure well.  No complications were noted.    Procedure performed  Transesophageal echocardiogram Doppler study (color continuous-wave and pulsed wave)    Results  Technically satisfactory study.  Mitral valve is structurally normal.  Tricuspid valve is normal.  Aortic valve is tricuspid and is normal.  Mild mitral aortic and tricuspid regurgitation is present.  Biatrial enlargement is present.  Left atrial appendage enlargement without clot.  Diffuse left ventricular enlargement and hypocontractility is present with ejection fraction of 25 to 30%.  No pericardial fusion or intracardiac thrombus is present.  Right atrium right ventricle enlargement is present.  Bubble study revealed PFO.  No pericardial effusion or intracardiac thrombus is seen.    Impression  Biatrial and biventricular enlargement.  Diffuse left ventricular hypocontractility with ejection fraction of 25 to 30%.  Mild smoking effect in the left atrium.  Small PFO.  Mild mitral aortic and tricuspid regurgitation is present.          STRESS MYOVIEW:    Cardiolite (Tc-99m Sestamibi) stress test    CARDIAC CATHETERIZATION:            OTHER:         Assessment & Plan     Principal Problem:    Chronic systolic (congestive) heart failure (HCC)  Active Problems:    S/P CABG (coronary artery bypass graft)    Primary hypertension    Elevated troponin    ANNETTE treated with BiPAP    Major depressive disorder, recurrent, mild (HCC)    Mixed hyperlipidemia    Flaccid hemiplegia affecting right dominant side (HCC)    Diabetic neuropathy (HCC)    Chronic obstructive pulmonary disease  with (acute) exacerbation (HCC)    Anemia of renal disease    End stage renal disease (HCC)    Chronic respiratory failure with hypoxia, on home oxygen therapy (HCC)    Other specified hypothyroidism    Fluid overload    Stage 5 chronic kidney disease on chronic dialysis (HCC)    Bacteremia    Normocytic anemia due to blood loss    Essential (primary) hypertension    Cardiopulmonary arrest with successful resuscitation (HCC)    Ventricular fibrillation (HCC)      [[[[[[[[[[[[[[[[[[[[[[[[[    Impression  ==============  -Shortness of breath     - Positive blood cultures for Streptococcus 2 out of 2 sets.  Rule out tunneled catheter infection.     -Acute on chronic fluid overload.      -status post CABG 2005. status post stent to LAD 2009    Status post stent to LAD 11/13/2015  Status post stent to mid LAD and SVG to marginal branch 09/16/2016.  Status post stent to mid LAD 5/24/2021  Status post stent to SVG to RCA 5/26/2021  Status post stent to proximal LAD 5/13/2022     Cardiac catheterization 5/13/2022 revealed  Left ventricle is significantly enlarged with severe and diffuse hypocontractility with ejection fraction of 20%.  Left main coronary artery is normal.  Left anterior descending artery has proximal 90% disease.  Mid segment stents are patent.  Circumflex coronary artery is totally occluded.  Right coronary artery is totally occluded.  SVG to marginal branch is totally and chronically occluded.  SVG to RCA is patent.  Left subclavian artery is normal.  Left internal mammary artery is not a graft and is normal.    Cardiac catheterization 5/24/2021 revealed  Left ventricle angiogram was not performed due to pre-existing renal dysfunction.  Left main coronary artery normal.  Left anterior descending artery has mid segment lengthy 90% disease within the previously placed stent.  Distal left into descending artery has diffuse disease.  Circumflex coronary artery is totally occluded.  (Chronic)  Right coronary  artery is chronically occluded.  SVG to marginal branch (jump graft to OM1 and OM 2) is totally occluded (new finding compared to 2015.  SVG to PDA has distal radiolucency.  However no definite obstructive disease is present.       -status post myocardial infarction 2000 and 2002 .      -history of intermittent but mostly persistent atrial fibrillation     -Acute on chronic congestive heart failure.  Compensated at this time.     Recent echocardiogram showed left ventricle dysfunction with ejection fraction of 35%.     -History of right-sided weakness with left MCA territory stroke.-Improved    MARSHA 5/13/2022 revealed  Biatrial and biventricular enlargement.  Diffuse left ventricular hypocontractility with ejection fraction of 25 to 30%.  Mild smoking effect in the left atrium.  Small PFO.  Mild mitral aortic and tricuspid regurgitation is present.     Transesophageal echocardiogram 11/30/2020.  Biatrial enlargement.  Smoking effect in the left atrium and left ventricle and left atrial appendage.  Mild mitral and aortic regurgitation.  Left ventricle enlargement with diffuse hypocontractility with ejection fraction of 35 to 40%.     Echocardiogram 11/27/2020 revealed left atrial enlargement left ventricle dysfunction with ejection fraction of 40%.  Negative bubble study.      -diabetes hypertension and sleep apnea.  ESRD.     -status post colon surgery (partial colectomy) appendectomy cholecystectomy right 4th toe removal and carpal tunnel surgery      -continued smoking 1 pack per day -abstinence from smoking      -allergy to codeine.  ============   Plan  ================  Shortness of breath  Positive blood cultures for Streptococcus 2 out of 2 sets.  Rule out tunneled catheter infection..  MARSHA 5/13/2022-negative for vegetations.     Status post CABG  Recent CODE BLUE and ventricular fibrillation.  Patient had cardiac catheterization and stent placement to proximal LAD 5/13/2022.  Patient is not having any angina  pectoris or congestive heart failure     Atrial fibrillation-chronic  Rate is better controlled  Patient is on Coreg at 12.5 mg p.o. twice a day amiodarone and Cardizem.      ESRD  Patient is undergoing dialysis.  Patient had dialysis yesterday    Hypokalemia  k 3.2-supplements.  - Improved at 4.0       Anticoagulation  Patient was on Eliquis 5 mg twice a day.  Observe for toxic effects.  Eliquis was on hold.  Have discussed with attending physician for coordination of care.  Eliquis restarted.    Ischemic cardiomyopathy.  Recent ventricular fibrillation probably due to severe proximal left anterior descending artery disease.  Likely left ventricular dysfunction we will continue despite having recent stent placement.  Likely patient would benefit from ICD placement (single-chamber).  Patient has narrow QRS complex.  However would like to wait until the infection is cleared up.  Patient may benefit from LifeVest.  I am not sure however patient would comply with wearing LifeVest.  We discussed with infectious disease regarding timing of placement of ICD.    Current medications include amiodarone 200 mg a day aspirin atorvastatin Plavix levothyroxine metoprolol 25 mg twice a day midodrine 15 mg 3 times daily pantoprazole.     Follow-up labs ordered.     Further plan will depend on patient's progress.   ]]]]]]]]]]]]]]]]]]]]]]       Jose G Rm MD  05/16/22  06:20 EDT

## 2022-05-16 NOTE — PLAN OF CARE
Goal Outcome Evaluation:  Plan of Care Reviewed With: patient        Progress: no change     Pt vitals have been stable throughout shift. Pt was given snack around 0400 this morning to help with keeping BS from getting too low this morning. Pt has not complaints at this time.

## 2022-05-16 NOTE — NURSING NOTE
HD completed with 100mL net volume removed.  Blood was returned post treatment and lumens were locked with Heparin before placing new caps on hubs.  Lumens were then wrapped with gauze and taped.    Pre VS:   Post VS:  BP 91/40   BP 98/35  HR 61   HR 67    Net volume removed: 100mL    [facility]

## 2022-05-16 NOTE — PLAN OF CARE
Goal Outcome Evaluation:     Pt had HD today with 1L taken off. Pt has been confused and only oriented to self. Patient will complete antibiotics on 5/20 and then plan to possibly place ICD. Cardiology following.       Problem: Adult Inpatient Plan of Care  Goal: Plan of Care Review  Outcome: Ongoing, Progressing  Goal: Patient-Specific Goal (Individualized)  Outcome: Ongoing, Progressing  Goal: Absence of Hospital-Acquired Illness or Injury  Outcome: Ongoing, Progressing  Intervention: Identify and Manage Fall Risk  Recent Flowsheet Documentation  Taken 5/16/2022 0900 by Zainab Coy RN  Safety Promotion/Fall Prevention: (to dialysis) patient off unit  Taken 5/16/2022 0800 by Zainab Coy RN  Safety Promotion/Fall Prevention:   activity supervised   clutter free environment maintained   nonskid shoes/slippers when out of bed   safety round/check completed  Intervention: Prevent Skin Injury  Recent Flowsheet Documentation  Taken 5/16/2022 0730 by Zainab Coy RN  Body Position:   turned   supine  Intervention: Prevent Infection  Recent Flowsheet Documentation  Taken 5/16/2022 0800 by Zainab Coy RN  Infection Prevention:   hand hygiene promoted   personal protective equipment utilized  Goal: Optimal Comfort and Wellbeing  Outcome: Ongoing, Progressing  Goal: Readiness for Transition of Care  Outcome: Ongoing, Progressing

## 2022-05-17 PROBLEM — T82.858A ARTERIOVENOUS FISTULA STENOSIS (HCC): Chronic | Status: ACTIVE | Noted: 2022-01-01

## 2022-05-17 NOTE — PLAN OF CARE
"Assessment: Benson Mclean presents with ADL impairments below baseline abilities which indicate the need for continued skilled intervention while inpatient. Patient pleasant today and VSS throughout treatment. Mod-Max Ax2 for supine to sit, maintaining balance with min assist. Pt also educated on the use of remotes in room for control of volume and changing the channel for QOL while in hospital. Tolerating session today without incident. Will continue to follow and progress as tolerated.     Plan/Recommendations:   Moderate Intensity Therapy recommended post-acute care. This is recommended as therapy feels the patient would require 3-4 days per week and wouldn't tolerate \"3 hour daily\" rehab intensity. SNF would be the preferred choice. If the patient does not agree to SNF, arrange HH or OP depending on home bound status. If patient is medically complex, consider LTACH.    Pt desires Skilled Rehab placement at discharge. Pt cooperative; agreeable to therapeutic recommendations and plan of care.   "

## 2022-05-17 NOTE — THERAPY TREATMENT NOTE
"Subjective: Pt agreeable to therapeutic plan of care.    Objective:     Bed mobility - Mod-A, Max-A and Assist x 2  Transfers - N/A or Not attempted.  Ambulation - 0 feet N/A or Not attempted.     Pt sat at the edge of bed for ~ 8 minutes with min assist of 1.  Pt performed 2-3 reps of LAQ, horizontal shoulder ADD/ABD, cervical rotation to right and left x 2 reps    Pain: 4 VAS chest  Education: Provided education on importance of mobility and skilled verbal / tactile cueing throughout intervention.     Assessment: Benson Mclean presents with functional mobility impairments which indicate the need for skilled intervention. Tolerating session today without incident. Will continue to follow and progress as tolerated.     Plan/Recommendations:   Moderate Intensity Therapy recommended post-acute care. This is recommended as therapy feels the patient would require 3-4 days per week and wouldn't tolerate \"3 hour daily\" rehab intensity. SNF would be the preferred choice. If the patient does not agree to SNF, arrange HH or OP depending on home bound status. If patient is medically complex, consider LTACH.. Pt requires no DME at discharge.     Pt desires Skilled Rehab placement at discharge. Pt cooperative; agreeable to therapeutic recommendations and plan of care.     Basic Mobility 6-click:  Rollin = Total, A lot = 2, A little = 3; 4 = None  Supine>Sit:   1 = Total, A lot = 2, A little = 3; 4 = None   Sit>Stand with arms:  1 = Total, A lot = 2, A little = 3; 4 = None  Bed>Chair:   1 = Total, A lot = 2, A little = 3; 4 = None  Ambulate in room:  1 = Total, A lot = 2, A little = 3; 4 = None  3-5 Steps with railin = Total, A lot = 2, A little = 3; 4 = None  Score: 6    Modified [place]: 4 = Moderately severe disability (Unable to attend to own bodily needs without assistance, and unable to walk unassisted)     Post-Tx Position: Supine with HOB Elevated, Alarms activated and Call light and personal items " within reach  PPE: gloves, surgical mask, eyewear protection

## 2022-05-17 NOTE — THERAPY TREATMENT NOTE
"Subjective: Pt agreeable to therapeutic plan of care.  Cognition: oriented to Person and Place    Objective:     Bed Mobility: Mod-A, Max-A and Assist x 2  Functional Transfers: N/A or Not attempted.  Functional Ambulation: N/A or Not attempted.    While seated EOB, patient performed 3 reps of B shoulder flexion/extension, elbow flexion/extension, knee flexion/extension.       Pain: 4 VAS chest  Education: Provided education on importance of mobility and skilled verbal / tactile cueing throughout intervention.     Assessment: Benson Mclean presents with ADL impairments below baseline abilities which indicate the need for continued skilled intervention while inpatient. Patient pleasant today and VSS throughout treatment. Mod-Max Ax2 for supine to sit, maintaining balance with min assist. Pt also educated on the use of remotes in room for control of volume and changing the channel for QOL while in hospital. Tolerating session today without incident. Will continue to follow and progress as tolerated.     Plan/Recommendations:   Moderate Intensity Therapy recommended post-acute care. This is recommended as therapy feels the patient would require 3-4 days per week and wouldn't tolerate \"3 hour daily\" rehab intensity. SNF would be the preferred choice. If the patient does not agree to SNF, arrange HH or OP depending on home bound status. If patient is medically complex, consider LTACH.    Pt desires Skilled Rehab placement at discharge. Pt cooperative; agreeable to therapeutic recommendations and plan of care.     Modified [place]: 5 = Severe disability (Requires constant nursing care and attention, bedridden, incontinent)    Post-Tx Position: Supine with HOB Elevated, Alarms activated and Call light and personal items within reach  PPE: gloves, surgical mask, eyewear protection    "

## 2022-05-17 NOTE — PROGRESS NOTES
[facility] Medicine Services Daily Progress Note    Patient Name: Benson Mclean  : 1950  MRN: [id number]  Primary Care Physician:  Rudolph Rooney MD  Date of admission: 2022      Subjective      Chief Complaint: Fluid overload, incomplete dialysis, bacteremia        Patient Reports he is having stomach pain.  States he does not feel good.  No specific complaints.  AICD placement deferred to another time.  Patient will be on IV antibiotics until May 20.  Will need a LifeVest prior to discharge.  Otherwise seems to be ready.     ROS negative except as above      Objective      Vitals:   Temp:  [97.6 °F (36.4 °C)-98 °F (36.7 °C)] 97.9 °F (36.6 °C)  Heart Rate:  [54-70] 64  Resp:  [18-20] 18  BP: ()/(33-71) 111/71  Flow (L/min):  [2] 2       Physical Exam  Vitals reviewed.   Constitutional:       Comments: Frail chronically ill-appearing   HENT:      Head: Normocephalic.   Cardiovascular:      Rate and Rhythm: Normal rate.   Pulmonary:      Effort: Pulmonary effort is normal.      Breath sounds: Rhonchi present.   Abdominal:      General: Abdomen is flat.      Palpations: Abdomen is soft.   Musculoskeletal:         General: Normal range of motion.      Cervical back: Normal range of motion.   Skin:     General: Skin is warm.   Neurological:      General: No focal deficit present.      Mental Status: He is alert and oriented to person, place, and time.   Psychiatric:         Mood and Affect: Mood normal.         Behavior: Behavior normal.        Result Review    Result Review:  I have personally reviewed the results from the time of this admission to 2022 16:41 EDT and agree with these findings:  [x]  Laboratory  []  Microbiology  []  Radiology  []  EKG/Telemetry   []  Cardiology/Vascular   []  Pathology  []  Old records  []  Other:  Most notable findings include: CRP 3.12.  Creatinine 2.38    Wounds (last 24 hours)     LDA Wound     Row Name 22 1600 22 1200  05/17/22 0800       Wound 05/06/22 1706 Right heel    Wound - Properties Group Placement Date: 05/06/22  -MAGNO Placement Time: 1706 -JP Present on Hospital Admission: Y  -MAGNO Side: Right  -MAGNO Location: heel  -MAGNO    Pressure Injury Stage -- U  -BREANNE U  -BREANNE    Dressing Appearance -- -- dry;intact  -BREANNE    Closure Adhesive bandage  -BREANNE Adhesive bandage  -BREANNE Adhesive bandage  -BREANNE    Base yellow;slough  -BREANNE yellow;slough  -BREANNE yellow;slough  -BREANNE    Drainage Amount scant  -BREANNE scant  -BREANNE scant  -BREANNE    Dressing Care -- -- silicone  -BREANNE    Retired Wound - Properties Group Placement Date: 05/06/22  -MAGNO Placement Time: 1706 -JP Present on Hospital Admission: Y  -MAGNO Side: Right  -MAGNO Location: heel  -MAGNO    Retired Wound - Properties Group Date first assessed: 05/06/22  -MAGNO Time first assessed: 1706 -MAGNO Present on Hospital Admission: Y  -MAGNO Side: Right  -MAGNO Location: heel  -MAGNO       Wound 03/30/22 0343 Left heel    Wound - Properties Group Placement Date: 03/30/22  -AS Placement Time: 0343 -AS Present on Hospital Admission: Y  -AS Side: Left  -AS Location: heel  -AS    Pressure Injury Stage -- DTPI  -BREANNE --    Dressing Appearance -- -- dry;intact  -BREANNE    Closure Adhesive bandage  -BREANNE Adhesive bandage  -BREANNE Adhesive bandage  -BREANNE    Base non-blanchable;maroon/purple;yellow  -BREANNE non-blanchable;maroon/purple;yellow  -BREANNE non-blanchable;maroon/purple;yellow  -BREANNE    Drainage Amount none  -BREANNE none  -BREANNE none  -BREANNE    Retired Wound - Properties Group Placement Date: 03/30/22  -AS Placement Time: 0343  -AS Present on Hospital Admission: Y  -AS Side: Left  -AS Location: heel  -AS    Retired Wound - Properties Group Date first assessed: 03/30/22  -AS Time first assessed: 0343 -AS Present on Hospital Admission: Y  -AS Side: Left  -AS Location: heel  -AS       Wound 05/06/22 1710 Right anterior greater trochanter    Wound - Properties Group Placement Date: 05/06/22  -MAGNO Placement Time: 1710 -MAGNO Present on Hospital Admission: Y  -MAGNO Side: Right  -MAGNO  Orientation: anterior  -MAGNO Location: greater trochanter  -MAGNO    Dressing Appearance -- -- open to air  -BREANNE    Closure Open to air  -BREANNE Open to air  -BREANNE Open to air  -BREANNE    Base pink;scab  -BREANNE pink;scab  -BREANNE pink;scab  -BREANNE    Drainage Amount none  -BREANNE none  -BREANNE none  -BREANNE    Retired Wound - Properties Group Placement Date: 05/06/22  -MAGNO Placement Time: 1710 -MAGNO Present on Hospital Admission: Y  -MAGNO Side: Right  -MAGNO Orientation: anterior  -MAGNO Location: greater trochanter  -MAGNO    Retired Wound - Properties Group Date first assessed: 05/06/22  -MAGNO Time first assessed: 1710  -MAGNO Present on Hospital Admission: Y  -MAGNO Side: Right  -MAGNO Location: greater trochanter  -MAGNO       Wound 03/30/22 0341 Left posterior thigh    Wound - Properties Group Placement Date: 03/30/22  -AS Placement Time: 0341 -AS Present on Hospital Admission: Y  -AS Side: Left  -AS Orientation: posterior  -AS Location: thigh  -AS    Dressing Appearance -- -- open to air  -BREANNE    Closure Open to air  -BREANNE Open to air  -BREANNE Open to air  -BREANNE    Base dry;pink  -BREANNE dry;pink  -BREANNE dry;pink  -BREANNE    Drainage Amount none  -BREANNE none  -BREANNE none  -BREANNE    Retired Wound - Properties Group Placement Date: 03/30/22  -AS Placement Time: 0341 -AS Present on Hospital Admission: Y  -AS Side: Left  -AS Orientation: posterior  -AS Location: thigh  -AS    Retired Wound - Properties Group Date first assessed: 03/30/22  -AS Time first assessed: 0341  -AS Present on Hospital Admission: Y  -AS Side: Left  -AS Location: thigh  -AS       Wound 03/30/22 0340 perineum Pressure Injury    Wound - Properties Group Placement Date: 03/30/22  -AS Placement Time: 0340  -AS Present on Hospital Admission: Y  -AS Location: perineum  -AS Primary Wound Type: Pressure inj  -AS    Closure Open to air  -BREANNE Open to air  -BREANNE Open to air  -BREANNE    Base red;dry;clean  -BREANNE red;dry;clean  -BREANNE red;dry;clean  -BREANNE    Periwound excoriated  -BREANNE excoriated  -BREANNE excoriated  -BREANNE    Drainage Amount none  -BREANNE none  -BREANNE none   -BREANNE    Retired Wound - Properties Group Placement Date: 03/30/22  -AS Placement Time: 0340  -AS Present on Hospital Admission: Y  -AS Location: perineum  -AS Primary Wound Type: Pressure inj  -AS    Retired Wound - Properties Group Date first assessed: 03/30/22  -AS Time first assessed: 0340  -AS Present on Hospital Admission: Y  -AS Location: perineum  -AS Primary Wound Type: Pressure inj  -AS       Wound 05/11/22 0230 Right posterior hand Skin Tear    Wound - Properties Group Placement Date: 05/11/22  - Placement Time: 0230  - Side: Right  - Orientation: posterior  - Location: hand  - Primary Wound Type: Skin tear  -    Closure OZIEL  -BREANNE OZIEL  -BREANNE OZIEL  -BREANNE    Base dressing in place, unable to visualize  -BREANNE dressing in place, unable to visualize  -BREANNE dressing in place, unable to visualize  -BREANNE    Retired Wound - Properties Group Placement Date: 05/11/22  - Placement Time: 0230  - Side: Right  - Orientation: posterior  - Location: hand  - Primary Wound Type: Skin tear  -    Retired Wound - Properties Group Date first assessed: 05/11/22  - Time first assessed: 0230  - Side: Right  - Location: hand  - Primary Wound Type: Skin tear  -    Row Name 05/17/22 0000 05/16/22 2000          Wound 05/06/22 1706 Right heel    Wound - Properties Group Placement Date: 05/06/22  -MAGNO Placement Time: 1706 -JP Present on Hospital Admission: Y  -MAGNO Side: Right  -MAGNO Location: heel  -MAGNO     Dressing Appearance -- intact;dry  -RD     Closure -- Adhesive bandage  -RD     Base -- yellow;slough  -RD     Drainage Amount -- scant  -RD     Care, Wound -- pressure removed  heel boots  -RD     Dressing Care -- gauze;silicone  -RD     Retired Wound - Properties Group Placement Date: 05/06/22  -MAGNO Placement Time: 1706 -MAGNO Present on Hospital Admission: Y  -MAGNO Side: Right  -MAGNO Location: heel  -MAGNO     Retired Wound - Properties Group Date first assessed: 05/06/22  -MAGNO Time first assessed: 1706 -JP Present on  Hospital Admission: Y  -MAGNO Side: Right  -MAGNO Location: heel  -MAGNO            Wound 03/30/22 0343 Left heel    Wound - Properties Group Placement Date: 03/30/22  -AS Placement Time: 0343 -AS Present on Hospital Admission: Y  -AS Side: Left  -AS Location: heel  -AS     Dressing Appearance -- dry;intact  -RD     Closure -- Adhesive bandage  -RD     Base -- non-blanchable;maroon/purple;yellow  -RD     Drainage Amount -- none  -RD     Care, Wound -- pressure removed  -RD     Dressing Care -- open to air  -RD     Retired Wound - Properties Group Placement Date: 03/30/22  -AS Placement Time: 0343 -AS Present on Hospital Admission: Y  -AS Side: Left  -AS Location: heel  -AS     Retired Wound - Properties Group Date first assessed: 03/30/22  -AS Time first assessed: 0343  -AS Present on Hospital Admission: Y  -AS Side: Left  -AS Location: heel  -AS            Wound 05/06/22 1710 Right anterior greater trochanter    Wound - Properties Group Placement Date: 05/06/22  -MAGNO Placement Time: 1710  -MAGNO Present on Hospital Admission: Y  -MAGNO Side: Right  -MAGNO Orientation: anterior  -MAGNO Location: greater trochanter  -MAGNO     Dressing Appearance -- open to air  -RD     Closure -- Open to air  -RD     Base -- pink;scab  -RD     Drainage Amount -- none  -RD     Care, Wound -- pressure removed  -RD     Dressing Care -- skin barrier agent applied  -RD     Periwound Care -- dry periwound area maintained;barrier ointment applied  -RD     Retired Wound - Properties Group Placement Date: 05/06/22  -MAGNO Placement Time: 1710  -MAGNO Present on Hospital Admission: Y  -MAGNO Side: Right  -MAGNO Orientation: anterior  -MAGNO Location: greater trochanter  -MAGNO     Retired Wound - Properties Group Date first assessed: 05/06/22  -MAGNO Time first assessed: 1710  -MAGNO Present on Hospital Admission: Y  -MAGNO Side: Right  -MAGNO Location: greater trochanter  -MAGNO            Wound 03/30/22 0341 Left posterior thigh    Wound - Properties Group Placement Date: 03/30/22  -AS Placement  Time: 0341  -AS Present on Hospital Admission: Y  -AS Side: Left  -AS Orientation: posterior  -AS Location: thigh  -AS     Dressing Appearance -- open to air  -RD     Closure -- Open to air  -RD     Base -- dry;pink  -RD     Drainage Amount none  -RD none  -RD     Dressing Care -- skin barrier agent applied  -RD     Periwound Care -- barrier ointment applied  -RD     Retired Wound - Properties Group Placement Date: 03/30/22  -AS Placement Time: 0341  -AS Present on Hospital Admission: Y  -AS Side: Left  -AS Orientation: posterior  -AS Location: thigh  -AS     Retired Wound - Properties Group Date first assessed: 03/30/22  -AS Time first assessed: 0341  -AS Present on Hospital Admission: Y  -AS Side: Left  -AS Location: thigh  -AS            Wound 03/30/22 0340 perineum Pressure Injury    Wound - Properties Group Placement Date: 03/30/22  -AS Placement Time: 0340  -AS Present on Hospital Admission: Y  -AS Location: perineum  -AS Primary Wound Type: Pressure inj  -AS     Dressing Appearance -- open to air  -RD     Closure -- Open to air  -RD     Base -- red;dry;clean  -RD     Periwound -- excoriated  -RD     Drainage Amount -- none  -RD     Care, Wound -- pressure removed  -RD     Dressing Care -- skin barrier agent applied  -RD     Periwound Care -- barrier ointment applied  -RD     Retired Wound - Properties Group Placement Date: 03/30/22  -AS Placement Time: 0340  -AS Present on Hospital Admission: Y  -AS Location: perineum  -AS Primary Wound Type: Pressure inj  -AS     Retired Wound - Properties Group Date first assessed: 03/30/22  -AS Time first assessed: 0340  -AS Present on Hospital Admission: Y  -AS Location: perineum  -AS Primary Wound Type: Pressure inj  -AS            Wound 05/11/22 0230 Right posterior hand Skin Tear    Wound - Properties Group Placement Date: 05/11/22  - Placement Time: 0230  - Side: Right  - Orientation: posterior  - Location: hand  - Primary Wound Type: Skin tear  -      Dressing Appearance -- intact;dry  -RD     Closure -- OZIEL  -RD     Base -- dressing in place, unable to visualize  -RD     Retired Wound - Properties Group Placement Date: 05/11/22  - Placement Time: 0230 - Side: Right  - Orientation: posterior  - Location: hand  - Primary Wound Type: Skin tear  -     Retired Wound - Properties Group Date first assessed: 05/11/22  - Time first assessed: 0230  - Side: Right  - Location: hand  - Primary Wound Type: Skin tear  -           User Key  (r) = Recorded By, (t) = Taken By, (c) = Cosigned By    Initials Name Provider Type    Jessica Isaac, RN Registered Nurse    Zainab Griffiths RN Registered Nurse    Zainba Manuel RN Registered Nurse    AS Maricruz Mi, RN Registered Nurse    Dontrell Ferguson RN Registered Nurse                Assessment/Plan    71-year-old gentleman with fluid overload and bacteremia     Active Hospital Problems:          Active Hospital Problems     Diagnosis     • **Fluid overload     • Chronic respiratory failure with hypoxia, on home oxygen therapy (HCC)     • Elevated troponin     • Hypothyroidism (acquired)     • End stage renal disease (HCC)     • Anemia of renal disease     • Mixed hyperlipidemia     • Major depressive disorder, recurrent, mild (HCC)     • Flaccid hemiplegia of right dominant side as late effect of cerebral infarction ([facility])     • COPD with acute exacerbation ([facility])     • S/P CABG (coronary artery bypass graft)     • Essential hypertension     • ANNETTE treated with BiPAP        Plan:      Shortness of breath secondary to fluid overload  Chronic respiratory failure with oxygen dependence    -Improving.  -CXR: small right greater than left pleural effusions.  Bibasilar airspace disease, right greater than left, favored to represent atelectasis.   -proBNP >70,000 (previous proBNP march 2022 was also >70,000)       -will recheck in the a.m.  -pt on his home rate of 3L O2 via [place]  -pt did not complete  dialysis prior to admission  -nephrology consulted appreciate assistance     Strep bovis bacteremia.  ID is following.  IV antibiotics ordered  Pending culture of dialysis catheter  Pending final identification  Echo   Appreciate ID assistance  Rocephin until May 20 IV.  Can finish at the facility     Chronically elevated troponin  -troponin 0.33 compared to troponin 0.146 on 3/12/2022.   -Will trend      ESRD on HD MWF  -on midodrine for BP support  -nephrology to manage     CVA with right sided residual weakness  -on eliquis, statin       -restart Elliquis in the am.     CAD s/p CABG, PCI  Chronic atrial fibrillation  Hyperlipidemia  -chronic, controlled.   -continue Eliquis, atorvastatin, and metoprolol  -For MARSHA cardiac cath for ablation treatment      DM type 2  -hemoglobin A1c 6.3 on 3/12/2022 and previous admission basal insulin stopped due to hypoglycemia  -SSI AC/HS     Hypothyroidism  -Continue home synthroid     Major depressive disorder, recurrent, mild   Dementia  -Continue home zoloft, aricept     ANNETTE  -cpap qhs      Obesity (BMI 30-39.9)  BMI 36.92  -Lifestyle modifications to reduce cardiovascular risk factors     Dysphagia  Patient complaining about his current diet.  Speech following.     Possible AICD placement deferred to a later time.  Patient pending LifeVest for discharge back to facility.       DVT prophylaxis: eliquis      CODE STATUS:    Admission Status:  I believe this patient meets inpatient status.     I discussed the patient's findings and my recommendations with patient.     This patient has been examined wearing appropriate Personal Protective Equipment.    05/17/22      Electronically signed by Sunny Shannon MD, 05/17/22, 16:41 EDT.  [facility] Hospitalist Team

## 2022-05-17 NOTE — CONSULTS
Name: Benson Mclean ADMIT: 2022   : 1950  PCP: Rudolph Rooney MD    MRN: [id number] LOS: 11 days   AGE/SEX: 71 y.o. male  ROOM:    [facility]    Patient Care Team:  Rudolph Rooney MD as PCP - General (Family Medicine)  Samantha Zabala APRN as PCP - Family Medicine  Jose G Rm MD as Consulting Physician (Cardiology)  Chief Complaint   Patient presents with   • Shortness of Breath     CC: AV fistula dysfunction    Benson Mclean is an infirm 71-year-old gentleman with end-stage renal disease on hemodialysis.  On 2022 he underwent creation of a left brachiocephalic AV fistula.  There was perivenous inflammation identified at the time of procedure, but the inflammation did not appear to result in venous narrowing.  He experienced healing of his fistula, and despite complaints of cold hand, had a palpable radial artery pulse.  He was seen in outpatient follow-up on 5/3/2022 and AV fistula scan demonstrated severe anastomotic stenosis.  Flow volumes were inadequate.  At that time, plans were made for outpatient AV fistulogram, which had been scheduled for today.  Subsequently, the patient was admitted with bacteremia and has experienced a shruthi course.  He suffered a V. fib arrest from which she has been resuscitated. He is not doing well overall.  We were unaware that he had been admitted to the hospital, as the patient is confused, and so could not have alerted current caregivers.  In addition his wife is apparently hospitalized herself.  His situation is reviewed and discussed with his nurse.  Although the patient is confused, he says repeatedly that he does not feel well at all.  He had breakfast.    Review of Systems   Unable to perform ROS: Mental status change     Past Medical History:   Diagnosis Date   • A-fib ([facility]) 8/3/2021   • Anemia    • Appetite loss    • Arthritis    • Brain bleed ([facility])    • Carpal tunnel syndrome on left    • CHF (congestive heart failure) ([facility])    •  Chronic left-sided low back pain without sciatica 12/27/2017   • CKD (chronic kidney disease) stage 3, GFR 30-59 ml/min ([facility])    • Closed fracture of seventh thoracic vertebra ([facility]) 8/23/2020   • Cognitive communication deficit 12/1/2020   • COPD (chronic obstructive pulmonary disease) ([facility])    • Coronary artery disease    • COVID-19 2/19/2021   • Cytokine release syndrome, grade 1 5/24/2021   • Depression    • Diabetic neuropathy ([facility])    • Dialysis patient ([facility])     mon wed fri   • DM2 (diabetes mellitus, type 2) ([facility])    • GERD (gastroesophageal reflux disease)    • Hyperlipidemia    • Hypertension    • Hypogonadism in male    • Neuropathy    • Nonsustained ventricular tachycardia ([facility]) 7/23/2021   • Obesity    • Paroxysmal atrial fibrillation ([facility]) 12/1/2020   • Sleep apnea    • Stroke ([facility])    • Unsteady gait      Past Surgical History:   Procedure Laterality Date   • ANGIOPLASTY      X2   • APPENDECTOMY     • ARTERIOVENOUS FISTULA/SHUNT SURGERY Left 1/20/2022    Procedure: LEFT BRACHIAL CEPHALIC ARTERIAL VENOUS FISTULA CREATION;  Surgeon: Yony Davila MD;  Location: [facility] MAIN OR;  Service: Vascular;  Laterality: Left;   • CARDIAC CATHETERIZATION  11/13/2015   • CARDIAC CATHETERIZATION N/A 5/24/2021    Procedure: Left Heart Cath and coronary angiogram;  Surgeon: Jose G Rm MD;  Location: [facility] CATH INVASIVE LOCATION;  Service: Cardiovascular;  Laterality: N/A;   • CARDIAC CATHETERIZATION  5/24/2021    Procedure: Saphenous Vein Graft;  Surgeon: Jose G Rm MD;  Location: [facility] CATH INVASIVE LOCATION;  Service: Cardiovascular;;   • CARDIAC CATHETERIZATION N/A 5/24/2021    Procedure: Percutaneous Coronary Intervention;  Surgeon: Bernabe Zambrano MD;  Location: [facility] CATH INVASIVE LOCATION;  Service: Cardiology;  Laterality: N/A;   • CARDIAC CATHETERIZATION N/A 5/24/2021    Procedure: Stent NIELS coronary;  Surgeon: Bernabe Zambrano MD;  Location: [facility] CATH INVASIVE LOCATION;  Service:  Cardiology;  Laterality: N/A;   • CARDIAC CATHETERIZATION N/A 5/26/2021    Procedure: Percutaneous Coronary Intervention;  Surgeon: Bernabe Zambrano MD;  Location: [facility] CATH INVASIVE LOCATION;  Service: Cardiovascular;  Laterality: N/A;   • CARDIAC CATHETERIZATION N/A 5/26/2021    Procedure: Stent NIELS bypass graft;  Surgeon: Bernabe Zambrano MD;  Location: [facility] CATH INVASIVE LOCATION;  Service: Cardiovascular;  Laterality: N/A;   • CARDIAC CATHETERIZATION N/A 5/13/2022    Procedure: Left Heart Cath and coronary angiogram;  Surgeon: Jose G Rm MD;  Location: [facility] CATH INVASIVE LOCATION;  Service: Cardiovascular;  Laterality: N/A;   • CARDIAC CATHETERIZATION  5/13/2022    Procedure: Saphenous Vein Graft;  Surgeon: Jose G Rm MD;  Location: [facility] CATH INVASIVE LOCATION;  Service: Cardiovascular;;   • CARDIAC CATHETERIZATION N/A 5/13/2022    Procedure: Stent NIELS coronary;  Surgeon: Estefany Hyde MD;  Location: [facility] CATH INVASIVE LOCATION;  Service: Cardiovascular;  Laterality: N/A;   • CARDIAC ELECTROPHYSIOLOGY PROCEDURE N/A 5/24/2021    Procedure: Temporary Pacemaker;  Surgeon: Bernabe Zambrano MD;  Location: [facility] CATH INVASIVE LOCATION;  Service: Cardiology;  Laterality: N/A;   • CARDIAC ELECTROPHYSIOLOGY PROCEDURE N/A 5/26/2021    Procedure: Temporary Pacemaker;  Surgeon: Bernabe Zambrano MD;  Location: [facility] CATH INVASIVE LOCATION;  Service: Cardiovascular;  Laterality: N/A;   • CARPAL TUNNEL RELEASE Left 04/29/2018    carpal tunnel- lt hand// other hand surgeries    • CATARACT EXTRACTION, BILATERAL  2002    Dr. Lux Acosta   • CHOLECYSTECTOMY     • COLON RESECTION  2005   • COLONOSCOPY N/A 5/10/2022    Procedure: COLONOSCOPY with polypectomy x3;  Surgeon: Herbie Keane MD;  Location: [facility] ENDOSCOPY;  Service: Gastroenterology;  Laterality: N/A;  colon polyps;internal hemorrhoids   • CORONARY ANGIOPLASTY     • CORONARY ANGIOPLASTY WITH STENT PLACEMENT  11/13/2015    PTCA stent to  proximal in stent and mid to distal lad   • CORONARY ANGIOPLASTY WITH STENT PLACEMENT  09/16/2016    PTCA stent to mid lad and stent to vein graft to marginal   • CORONARY ARTERY BYPASS GRAFT  2005    @ [facility]   • CYST REMOVAL      cyst removed from scrotum   • FOOT SURGERY Right 07/17/2018   • FOOT SURGERY Left 02/04/2019   • PORTACATH PLACEMENT     • TOE AMPUTATION Right     right toe removed D/T infected cut that went to the bone     Family History   Problem Relation Age of Onset   • Heart disease Mother    • Heart disease Father    • Diabetes Sister    • Heart disease Sister    • Diabetes Brother    • Mental illness Brother        Social History     Tobacco Use   • Smoking status: Former Smoker     Packs/day: 1.00     Years: 60.00     Pack years: 60.00     Types: Cigarettes   • Smokeless tobacco: Never Used   • Tobacco comment: Patient quit smoking 11/2020   Vaping Use   • Vaping Use: Never used   Substance Use Topics   • Alcohol use: No   • Drug use: Yes     Frequency: 1.0 times per week     Types: Marijuana     Comment: socially     Medications Prior to Admission   Medication Sig Dispense Refill Last Dose   • albuterol sulfate  (90 Base) MCG/ACT inhaler Inhale 2 puffs Every 4 (Four) Hours.   5/6/2022 at Unknown time   • apixaban (ELIQUIS) 5 MG tablet tablet Take 1 tablet by mouth Every 12 (Twelve) Hours. Indications: Atrial Fibrillation 60 tablet  5/6/2022 at Unknown time   • atorvastatin (LIPITOR) 40 MG tablet Take 40 mg by mouth Every Night.   5/5/2022 at Unknown time   • budesonide (Pulmicort) 0.5 MG/2ML nebulizer solution Take 2 mL by nebulization 2 (Two) Times a Day.   5/6/2022 at Unknown time   • cholecalciferol (VITAMIN D3) 25 MCG (1000 UT) tablet Take 1,000 Units by mouth Daily.   5/6/2022 at Unknown time   • docusate sodium (COLACE) 100 MG capsule Take 100 mg by mouth Daily.   5/6/2022 at Unknown time   • donepezil (ARICEPT) 10 MG tablet Take 10 mg by mouth Every Night.   5/5/2022 at Unknown time    • fluticasone (FLONASE) 50 MCG/ACT nasal spray 2 sprays by Each Nare route 2 (Two) Times a Day.   5/6/2022 at Unknown time   • guaiFENesin (MUCINEX) 600 MG 12 hr tablet Take 600 mg by mouth 2 (Two) Times a Day.   5/6/2022 at Unknown time   • HYDROcodone-acetaminophen (NORCO) 5-325 MG per tablet Take 1 tablet by mouth 3 (Three) Times a Day.   5/6/2022 at Unknown time   • ipratropium-albuterol (DUO-NEB) 0.5-2.5 mg/3 ml nebulizer Take 3 mL by nebulization 3 (Three) Times a Day. 360 mL  5/6/2022 at Unknown time   • ipratropium-albuterol (DUO-NEB) 0.5-2.5 mg/3 ml nebulizer Take 3 mL by nebulization Every 4 (Four) Hours As Needed for Wheezing. 360 mL  5/6/2022 at Unknown time   • levothyroxine (SYNTHROID, LEVOTHROID) 50 MCG tablet Take 50 mcg by mouth Every Morning.   5/6/2022 at Unknown time   • Metoprolol Tartrate 37.5 MG tablet Take 37.5 mg by mouth 2 (Two) Times a Day. Hold for SBP <110 or HR < 60   5/6/2022 at Unknown time   • midodrine (PROAMATINE) 10 MG tablet Take 10 mg by mouth 3 (Three) Times a Day Before Meals. Hold for BP > 140/90   5/6/2022 at Unknown time   • omeprazole (priLOSEC) 20 MG capsule Take 20 mg by mouth Daily.   5/6/2022 at Unknown time   • polyethylene glycol (MiraLax) 17 g packet Take 17 g by mouth 2 (Two) Times a Day.   5/6/2022 at Unknown time   • sertraline (ZOLOFT) 50 MG tablet Take 50 mg by mouth Daily.   5/6/2022 at Unknown time   • vitamin B-12 (CYANOCOBALAMIN) 1000 MCG tablet Take 1,000 mcg by mouth Daily.   5/6/2022 at Unknown time   • bisacodyl (DULCOLAX) 10 MG suppository Insert 10 mg into the rectum Daily As Needed for Constipation.   Unknown at Unknown time   • magnesium hydroxide (MILK OF MAGNESIA) 400 MG/5ML suspension Take 30 mL by mouth Daily As Needed for Constipation.   Unknown at Unknown time   • ondansetron (ZOFRAN) 4 MG tablet Take 4 mg by mouth Every 8 (Eight) Hours As Needed for Nausea or Vomiting.   Unknown at Unknown time   • phenylephrine-mineral oil-petrolatum  (Preparation H) 0.25-14-74.9 % ointment hemorrhoidal ointment Insert 1 application into the rectum Daily As Needed.   Unknown at Unknown time     amiodarone, 200 mg, Oral, Q24H  apixaban, 5 mg, Oral, Q12H  aspirin, 81 mg, Oral, Daily  atorvastatin, 40 mg, Oral, Nightly  budesonide, 0.5 mg, Nebulization, BID  cefTRIAXone, 2 g, Intravenous, Q24H  clopidogrel, 75 mg, Oral, Daily  donepezil, 10 mg, Oral, Nightly  insulin lispro, 0-7 Units, Subcutaneous, TID AC  ipratropium-albuterol, 3 mL, Nebulization, TID - RT  levothyroxine, 50 mcg, Oral, Q AM  lidocaine, 20 mL, Intradermal, Once  metoprolol tartrate, 25 mg, Oral, BID  midodrine, 15 mg, Oral, TID AC  pantoprazole, 40 mg, Oral, QAM  polyethylene glycol, 17 g, Oral, BID  sertraline, 50 mg, Oral, Daily  sodium chloride, 10 mL, Intravenous, Q12H  sodium chloride, 10 mL, Intravenous, Q12H  sorbitol, 50 mL, Oral, Daily    Codeine    Vital Signs and Labs:  Vital Signs   Patient Vitals for the past 24 hrs:   BP Temp Temp src Pulse Resp SpO2 Weight   05/17/22 1022 -- -- -- 55 18 100 % --   05/17/22 0931 93/61 97.8 °F (36.6 °C) Oral 59 18 100 % --   05/17/22 0650 -- -- -- 58 18 100 % --   05/17/22 0646 -- -- -- 63 18 100 % --   05/17/22 0645 -- -- -- 54 18 100 % --   05/17/22 0641 -- -- -- 63 18 100 % --   05/17/22 0507 115/56 97.6 °F (36.4 °C) Oral 65 20 100 % --   05/17/22 0500 -- -- -- -- -- -- 103 kg (227 lb 15.5 oz)   05/17/22 0202 104/48 97.7 °F (36.5 °C) Oral 63 18 100 % --   05/16/22 2230 (!) 95/33 97.6 °F (36.4 °C) Oral 62 18 100 % --   05/16/22 2026 115/50 -- -- 66 -- -- --   05/16/22 1829 -- -- -- 70 18 100 % --   05/16/22 1825 -- -- -- 68 18 100 % --   05/16/22 1824 -- -- -- 69 18 100 % --   05/16/22 1820 -- -- -- 67 18 100 % --   05/16/22 1605 110/49 98 °F (36.7 °C) Oral 69 18 100 % --   05/16/22 1321 100/54 97.9 °F (36.6 °C) Oral -- 18 -- --     Physical Exam: Awake, disoriented to time and purpose.  Appears chronically and may be even acutely ill.  Left  antecubital incision is well-healed.  Has thrill and bruit in left antecubital fossa, but fistula is not palpated proximally into the upper arm.  Palpable radial pulse.  No upper extremity edema.  Overlying skin appears normal.     CBC    Results from last 7 days   Lab Units 05/17/22  0149 05/16/22  0113 05/15/22  0344 05/14/22  0837 05/13/22  0408 05/12/22  0501 05/11/22  0555   WBC 10*3/mm3 4.90 5.80 5.40 4.80 6.70 7.20 11.20*   HEMOGLOBIN g/dL 10.1* 10.2* 10.2* 10.1* 10.4* 10.3* 10.5*   PLATELETS 10*3/mm3 195 190 188 185 211 208 234     BMP   Results from last 7 days   Lab Units 05/17/22  0149 05/16/22  0113 05/15/22  0344 05/14/22  0837 05/13/22  0408 05/12/22  0949 05/12/22  0501 05/11/22  0555   SODIUM mmol/L 130* 133* 135* 136 133*  --  133* 133*   POTASSIUM mmol/L 4.2 4.6 4.6 4.7 4.4  --  4.2 3.2*   CHLORIDE mmol/L 95* 97* 99 100 98  --  100 97*   CO2 mmol/L 26.0 24.0 24.0 25.0 25.0  --  23.0 24.0   BUN mg/dL 11 16 13 10 16  --  12 21   CREATININE mg/dL 2.38* 3.11* 2.68* 2.09* 2.90*  --  2.48* 3.41*   GLUCOSE mg/dL 111* 119* 116* 137* 97  --  122* 167*   MAGNESIUM mg/dL 2.0  --   --  2.0 2.3 2.2 1.9 2.1   PHOSPHORUS mg/dL 2.9 4.0 3.8 3.3 3.2  --  2.0* 2.7     Cr Clearance Estimated Creatinine Clearance: 34.2 mL/min (A) (by C-G formula based on SCr of 2.38 mg/dL (H)).  Coag   Results from last 7 days   Lab Units 05/13/22  0408   INR  1.12*     HbA1C   Lab Results   Component Value Date    HGBA1C 6.2 (H) 05/06/2022    HGBA1C 6.3 (H) 03/12/2022    HGBA1C 7.4 (H) 07/21/2021     Active Hospital Problems    Diagnosis  POA   • **Chronic systolic (congestive) heart failure (HCC) [I50.22]  Yes   • Arteriovenous fistula stenosis (HCC) [T82.858A]  Yes   • Cardiopulmonary arrest with successful resuscitation (HCC) [I46.9]  No   • Ventricular fibrillation (HCC) [I49.01]  No   • Stage 5 chronic kidney disease on chronic dialysis (HCC) [N18.6, Z99.2]  Not Applicable   • Fluid overload [E87.70]  Yes   • Bacteremia [R78.81]   Yes   • Normocytic anemia due to blood loss [D50.0]  Yes   • Essential (primary) hypertension [I10]  Yes   • Other specified hypothyroidism [E03.8]  Yes   • Chronic respiratory failure with hypoxia, on home oxygen therapy (HCC) [J96.11, Z99.81]  Not Applicable   • End stage renal disease (HCC) [N18.6]  Yes   • Anemia of renal disease [N18.9, D63.1]  Yes   • Mixed hyperlipidemia [E78.2]  Yes   • Major depressive disorder, recurrent, mild (HCC) [F33.0]  Yes   • Flaccid hemiplegia affecting right dominant side (HCC) [G81.01]  Not Applicable   • Chronic obstructive pulmonary disease with (acute) exacerbation (HCC) [J44.1]  Yes   • Elevated troponin [R77.8]  Yes   • S/P CABG (coronary artery bypass graft) [Z95.1]  Not Applicable   • Primary hypertension [I10]  Yes   • ANNETTE treated with BiPAP [G47.33]  Yes   • Diabetic neuropathy (HCC) [E11.40]  Yes      Resolved Hospital Problems   No resolved problems to display.     Problem Points:  1:  Patient has an established problem, stable or improving  Total problem points:1    Data Points:  1:  I have reviewed or order clinical lab test  1:  I have reviewed or order radiology test (except heart catheterization or echo)  2:  I have personally and independently review of image, tracing, or specimen  1:  I have personally decided to obtain of records  Total data points:4 or more    Risk Points:  Moderate:  Two stable chronic illness    MDM requires 2/3 (Problem points, Data points and Risk)  MDM Prob point Data point Risk   SF 1 1 Minimal   Low 2 2 Low   Mod 3 3 Moderate   High 4 4 High     Code requires 3/3 (MDM, History and Exam)  Code MDM History Exam Time   80098 SF/Low Detailed Detailed 30   30501 Mod Comprehensive Comprehensive 50   98679 High Comprehensive Comprehensive 70     Detailed history:  4 elements HPI or status of 3 chronic problems; 2-9 system ROS  Comprehensive:  4 elements HPI or status of 3 chronic problems;  10 system ROS    Detailed Exam:  12 findings from any  organ system  Comprehensive Exam:  2 findings from each of 9 systems.   08851    Assessment & Plan       Chronic systolic (congestive) heart failure (HCC)    S/P CABG (coronary artery bypass graft)    Primary hypertension    Elevated troponin    ANNETTE treated with BiPAP    Major depressive disorder, recurrent, mild (HCC)    Mixed hyperlipidemia    Flaccid hemiplegia affecting right dominant side (HCC)    Diabetic neuropathy (HCC)    Chronic obstructive pulmonary disease with (acute) exacerbation (HCC)    Anemia of renal disease    End stage renal disease (HCC)    Chronic respiratory failure with hypoxia, on home oxygen therapy (HCC)    Other specified hypothyroidism    Fluid overload    Stage 5 chronic kidney disease on chronic dialysis (HCC)    Bacteremia    Normocytic anemia due to blood loss    Essential (primary) hypertension    Cardiopulmonary arrest with successful resuscitation (HCC)    Ventricular fibrillation (HCC)    Arteriovenous fistula stenosis (HCC)    71 y.o. male with chronic kidney disease stage V requiring hemodialysis.  Has severe anastomotic stenosis in left brachiocephalic AV fistula, created January, 2022.  Had previously been scheduled for AV fistulogram with anastomotic angioplasty, but patient now hospitalized with significant and life-threatening medical problems.  He indicates repetitively that he does not feel well and is not NPO.  Current notes include a lot of cut-and-paste so it is very difficult to determine exactly what is going on now, and specifically what his prognosis might be.  He like to cancel previously scheduled fistulogram.  Patient can follow-up in vascular surgery clinic as/if appropriate to reschedule fistulogram pending resolution of current problems.    I discussed the patients findings and my recommendations with patient and nursing staff.    Yony Davila MD  05/17/22  12:16 EDT    Please call my office with any question: [phone]

## 2022-05-17 NOTE — PLAN OF CARE
Goal Outcome Evaluation:  Plan of Care Reviewed With: patient        Progress: no change     At beginning of the shift, the pt's BS was low. It was treated with apple juice and peanut butter crackers. This did well and fixed the BS. Pt has been resting throughout the night and vitals have been stable. Early this morning the pt complained of some nausea. Zofran was given and it has since resolved.

## 2022-05-17 NOTE — CASE MANAGEMENT/SOCIAL WORK
Continued Stay Note  DOREEN Brennan     Patient Name: Benson Mclean  MRN: [id number]  Today's Date: 5/17/2022    Admit Date: 5/6/2022     Discharge Plan     Row Name 05/17/22 1501       Plan    Plan Return to [facility], will return skilled initially for IV abx (end date 5/20). No PASRR required. Precert approved 5/16, will need resubmitted on 5/18, however does not have to delay d/c. Current OP HD at [facility]. [facility] ordered, awaiting approval.    Plan Comments Confirmed with Suzan estrada at [facility] that patient can return on d/c, they will have to resubmit for insurance precert tomorrow however due to patient being a LTC resident this approval does not have to delay d/c. New order for [facility] noted, referral to Gale estrada at [facility]. Awaiting insurance approval and delivery. Updated patient at bedside on d/c plan, he remains agreeable.              Met with patient in room wearing PPE: mask    Maintained distance greater than six feet and spent less than 15 minutes in the room.          Caron Rodriguez RN

## 2022-05-17 NOTE — PLAN OF CARE
"Bed mobility - Mod-A, Max-A and Assist x 2  Transfers - N/A or Not attempted.  Ambulation - 0 feet N/A or Not attempted.     Pt sat at the edge of bed for ~ 8 minutes with min assist of 1.  Pt performed 2-3 reps of LAQ, horizontal shoulder ADD/ABD, cervical rotation to right and left x 2 reps      Plan/Recommendations:   Moderate Intensity Therapy recommended post-acute care. This is recommended as therapy feels the patient would require 3-4 days per week and wouldn't tolerate \"3 hour daily\" rehab intensity. SNF would be the preferred choice. If the patient does not agree to SNF, arrange HH or OP depending on home bound status. If patient is medically complex, consider LTACH.. Pt requires no DME at discharge.     Pt desires Skilled Rehab placement at discharge. Pt cooperative; agreeable to therapeutic recommendations and plan of care.            "

## 2022-05-17 NOTE — PROGRESS NOTES
[facility]     Progress Note    Patient Name: Benson Mclean  : 1950  MRN: [id number]  Primary Care Physician:  Rudolph Rooney MD  Date of admission: 2022    Subjective   Subjective     Chief Complaint: ESRD    History of Present Illness  Patient with ESRD on hd m/w/    Review of Systems    Objective   Objective     Vitals:   Temp:  [97.6 °F (36.4 °C)-98 °F (36.7 °C)] 97.6 °F (36.4 °C)  Heart Rate:  [54-70] 58  Resp:  [18-20] 18  BP: ()/(33-56) 115/56  Flow (L/min):  [2] 2    Physical Exam   General Appearance:  In no acute distress.    HEENT: Normal HEENT exam.     Extremities: .  There is no deformity, effusion or dependent edema.    Neurological: Patient is alert     Result Review    Result Review:  I have personally reviewed the results from the time of this admission to 2022 07:16 EDT and agree with these findings:  []  Laboratory  []  Microbiology  []  Radiology  []  EKG/Telemetry   []  Cardiology/Vascular   []  Pathology  []  Old records  []  Other:      Assessment & Plan   Assessment / Plan     Brief Patient Summary:  Benson Mclean is a 71 y.o. male who has  ESRD on hd //    Active Hospital Problems:  Active Hospital Problems    Diagnosis    • **Chronic systolic (congestive) heart failure (HCC)    • Cardiopulmonary arrest with successful resuscitation (HCC)    • Ventricular fibrillation (HCC)    • Stage 5 chronic kidney disease on chronic dialysis ([facility])    • Fluid overload    • Bacteremia      Added automatically from request for surgery [id number]     • Normocytic anemia due to blood loss      Added automatically from request for surgery [id number]     • Essential (primary) hypertension    • Other specified hypothyroidism    • Chronic respiratory failure with hypoxia, on home oxygen therapy ([facility])    • End stage renal disease (HCC)    • Anemia of renal disease    • Mixed hyperlipidemia    • Major depressive disorder, recurrent, mild ([facility])    • Flaccid hemiplegia affecting  right dominant side (HCC)    • Chronic obstructive pulmonary disease with (acute) exacerbation (HCC)    • Elevated troponin    • S/P CABG (coronary artery bypass graft)    • Primary hypertension    • ANNETTE treated with BiPAP    • Diabetic neuropathy (HCC)      Plan:   End-stage renal disease     Bacteremia     S/P CODE     A-fib - on Amio     SOA     CHF     History of CVA     History of coronary artery disease     Diabetes    Patient seen and examined. Awake, alert. No distress. Labs reviewed. S/p hd yesterday. Will continue mwf    Mary Harden MD

## 2022-05-17 NOTE — PROGRESS NOTES
Infectious Diseases Progress Note      LOS: 11 days   Patient Care Team:  Rudolph Rooney MD as PCP - General (Family Medicine)  Samantha Zabala APRN as PCP - Family Medicine  Jose G Rm MD as Consulting Physician (Cardiology)    Chief Complaint: Shortness of breath    Subjective       The patient has been afebrile for the last 24 hours.  The patient is hemodynamically stable requiring no vasopressors.  He is currently on 2 L of oxygen via nasal cannula      Review of Systems:   Review of Systems   Constitutional: Positive for fatigue.   HENT: Negative.    Eyes: Negative.    Respiratory: Positive for shortness of breath.    Cardiovascular: Negative.    Gastrointestinal: Negative.    Endocrine: Negative.    Genitourinary: Negative.    Musculoskeletal: Negative.    Skin: Negative.    Neurological: Positive for weakness.   Psychiatric/Behavioral: Negative.    All other systems reviewed and are negative.       Objective     Vital Signs  Temp:  [97.6 °F (36.4 °C)-98 °F (36.7 °C)] 97.8 °F (36.6 °C)  Heart Rate:  [54-70] 55  Resp:  [18-20] 18  BP: ()/(33-61) 93/61    Physical Exam:  Physical Exam  Vitals and nursing note reviewed.   Constitutional:       General: He is not in acute distress.     Appearance: Normal appearance. He is well-developed and normal weight. He is ill-appearing. He is not diaphoretic.   HENT:      Head: Normocephalic and atraumatic.   Eyes:      Conjunctiva/sclera: Conjunctivae normal.      Pupils: Pupils are equal, round, and reactive to light.   Cardiovascular:      Rate and Rhythm: Normal rate and regular rhythm.      Heart sounds: Normal heart sounds, S1 normal and S2 normal.   Pulmonary:      Effort: Pulmonary effort is normal. No respiratory distress.      Breath sounds: No stridor. Rales present. No wheezing.   Abdominal:      General: Bowel sounds are normal. There is no distension.      Palpations: Abdomen is soft. There is no mass.      Tenderness: There is no abdominal  tenderness. There is no guarding.   Musculoskeletal:         General: No deformity. Normal range of motion.      Cervical back: Neck supple.   Skin:     General: Skin is warm and dry.      Coloration: Skin is not pale.      Findings: No erythema or rash.   Neurological:      Mental Status: He is alert and oriented to person, place, and time.      Cranial Nerves: No cranial nerve deficit.      Comments: Generalized weakness   Psychiatric:         Mood and Affect: Mood normal.          Results Review:    I have reviewed all clinical data, test, lab, and imaging results.     Radiology  No Radiology Exams Resulted Within Past 24 Hours    Cardiology    Laboratory    Results from last 7 days   Lab Units 05/17/22  0149 05/16/22  0113 05/15/22  0344 05/14/22  0837 05/13/22  0408 05/12/22  0501 05/11/22  0555   WBC 10*3/mm3 4.90 5.80 5.40 4.80 6.70 7.20 11.20*   HEMOGLOBIN g/dL 10.1* 10.2* 10.2* 10.1* 10.4* 10.3* 10.5*   HEMATOCRIT % 33.3* 33.6* 33.0* 33.9* 34.3* 32.7* 34.9*   PLATELETS 10*3/mm3 195 190 188 185 211 208 234     Results from last 7 days   Lab Units 05/17/22  0149 05/16/22  0113 05/15/22  0344 05/14/22  0837 05/13/22  0408 05/12/22  0501 05/11/22  0555   SODIUM mmol/L 130* 133* 135* 136 133* 133* 133*   POTASSIUM mmol/L 4.2 4.6 4.6 4.7 4.4 4.2 3.2*   CHLORIDE mmol/L 95* 97* 99 100 98 100 97*   CO2 mmol/L 26.0 24.0 24.0 25.0 25.0 23.0 24.0   BUN mg/dL 11 16 13 10 16 12 21   CREATININE mg/dL 2.38* 3.11* 2.68* 2.09* 2.90* 2.48* 3.41*   GLUCOSE mg/dL 111* 119* 116* 137* 97 122* 167*   ALBUMIN g/dL 2.70* 3.00* 3.10* 3.10* 2.90* 3.00* 2.80*   BILIRUBIN mg/dL  --  0.3  --  0.4 0.3 0.3 0.3   ALK PHOS U/L  --  55  --  57 49 45 46   AST (SGOT) U/L  --  9  --  20 11 12 10   ALT (SGPT) U/L  --  7  --  7 6 7 8   CALCIUM mg/dL 7.9* 8.2* 8.6 8.6 8.2* 8.1* 8.0*                 Microbiology   Microbiology Results (last 10 days)     Procedure Component Value - Date/Time    MRSA Screen, PCR (Inpatient) - Swab, Nares [[id number]]   (Normal) Collected: 05/11/22 0557    Lab Status: Final result Specimen: Swab from Nares Updated: 05/11/22 0804     MRSA PCR No MRSA Detected    COVID PRE-OP / PRE-PROCEDURE SCREENING ORDER (NO ISOLATION) - Swab, Nasopharynx [[id number]]  (Normal) Collected: 05/10/22 0635    Lab Status: Final result Specimen: Swab from Nasopharynx Updated: 05/10/22 1143    Narrative:      The following orders were created for panel order COVID PRE-OP / PRE-PROCEDURE SCREENING ORDER (NO ISOLATION) - Swab, Nasopharynx.  Procedure                               Abnormality         Status                     ---------                               -----------         ------                     COVID-19,CEPHEID/ROHAN,CO...[[id number]]  Normal              Final result                 Please view results for these tests on the individual orders.    COVID-19,CEPHEID/ROHAN,COR/ZEYNEP/PAD/BEATRICE IN-HOUSE(OR EMERGENT/ADD-ON),NP SWAB IN TRANSPORT MEDIA 3-4 HR TAT, RT-PCR - Swab, Nasopharynx [[id number]]  (Normal) Collected: 05/10/22 0635    Lab Status: Final result Specimen: Swab from Nasopharynx Updated: 05/10/22 1143     COVID19 Not Detected    Narrative:      Fact sheet for providers: https://www.fda.gov/media/125590/download     Fact sheet for patients: https://www.fda.gov/media/500830/download  Fact sheet for providers: https://www.fda.gov/media/559968/download     Fact sheet for patients: https://www.fda.gov/media/773191/download    Blood Culture - Blood, Hand, Right [[id number]]  (Normal) Collected: 05/07/22 1519    Lab Status: Final result Specimen: Blood from Hand, Right Updated: 05/12/22 1617     Blood Culture No growth at 5 days          Medication Review:       Schedule Meds  amiodarone, 200 mg, Oral, Q24H  apixaban, 5 mg, Oral, Q12H  aspirin, 81 mg, Oral, Daily  atorvastatin, 40 mg, Oral, Nightly  budesonide, 0.5 mg, Nebulization, BID  cefTRIAXone, 2 g, Intravenous, Q24H  clopidogrel, 75 mg, Oral, Daily  donepezil, 10 mg, Oral, Nightly  insulin  lispro, 0-7 Units, Subcutaneous, TID AC  ipratropium-albuterol, 3 mL, Nebulization, TID - RT  levothyroxine, 50 mcg, Oral, Q AM  lidocaine, 20 mL, Intradermal, Once  metoprolol tartrate, 25 mg, Oral, BID  midodrine, 15 mg, Oral, TID AC  pantoprazole, 40 mg, Oral, QAM  polyethylene glycol, 17 g, Oral, BID  sertraline, 50 mg, Oral, Daily  sodium chloride, 10 mL, Intravenous, Q12H  sodium chloride, 10 mL, Intravenous, Q12H  sorbitol, 50 mL, Oral, Daily        Infusion Meds       PRN Meds  •  acetaminophen **OR** acetaminophen **OR** acetaminophen  •  acetaminophen  •  aluminum-magnesium hydroxide-simethicone  •  atropine  •  dextrose  •  dextrose  •  glucagon (human recombinant)  •  heparin (porcine)  •  insulin lispro **AND** insulin lispro  •  ipratropium-albuterol  •  melatonin  •  Morphine  •  ondansetron **OR** ondansetron  •  sodium chloride  •  sodium chloride  •  sodium chloride  •  sodium chloride        Assessment/Plan       Antimicrobial Therapy   1.  IV ceftriaxone        2.        3.        4.        5.            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  We need to rule out endocarditis or GI malignancy  Colonoscopy was done on May 10, 2022 and found to have polyps and biopsies are pending  -MARSHA was negative for vegetation  -Blood culture from 5/7/2022 is negative     The patient presented hospital with shortness of breath and chest x-ray showed density at the right base.  Need to rule out pneumonia  -CT showed some large bilateral pleural effusions but no obvious pneumonia which most likely secondary to volume overload  -Patient is currently on 2 L of oxygen by cannula     End-stage renal disease on hemodialysis.  Patient had right chest tunneled dialysis catheter and left arm AV fistula     History of CVA    S/p cardiac arrest with V. fib rhythm resuscitated successfully on May 11, 2022  -Patient had a heart catheterization with stent  to the LAD 5/13/2020 we have reviewed the patinet's labs, notes, and imagining studies for today. We are keeping the same antimicrobial therapy at this time.     Plan     Continue ceftriaxone 2 g IV daily for 2 weeks from the last negative blood culture on 5/7/2022-last day on 5/20/2022  Cardiology is considering placing a ICD-can be done anytime at this point  Supportive care  [facility]        Radha Jack MD  05/17/22  11:37 EDT    Note is dictated utilizing voice recognition software/Dragon

## 2022-05-17 NOTE — PROGRESS NOTES
Referring Provider: Sunny Shannon MD    Reason for follow-up:  Shortness of breath  Recent ventricular fibrillation.  Bacteremia  Status post stent       Patient Care Team:  Rudolph Rooney MD as PCP - General (Family Medicine)  Samantha Zabala APRN as PCP - Family Medicine  Jose G Rm MD as Consulting Physician (Cardiology)    Subjective .  Feeling better  ROS  Mild confusion.  Since I have last seen him yesterday, the patient has been without any chest discomfort ,shortness of breath, palpitations, dizziness or syncope.  Denies having any headache ,abdominal pain ,nausea, vomiting , diarrhea constipation, loss of weight or loss of appetite.  Denies having any excessive bruising ,hematuria or blood in the stool.    Review of all systems negative except as indicated    History  Past Medical History:   Diagnosis Date   • A-fib ([facility]) 8/3/2021   • Anemia    • Appetite loss    • Arthritis    • Brain bleed ([facility])    • Carpal tunnel syndrome on left    • CHF (congestive heart failure) ([facility])    • Chronic left-sided low back pain without sciatica 12/27/2017   • CKD (chronic kidney disease) stage 3, GFR 30-59 ml/min ([facility])    • Closed fracture of seventh thoracic vertebra ([facility]) 8/23/2020   • Cognitive communication deficit 12/1/2020   • COPD (chronic obstructive pulmonary disease) ([facility])    • Coronary artery disease    • COVID-19 2/19/2021   • Cytokine release syndrome, grade 1 5/24/2021   • Depression    • Diabetic neuropathy ([facility])    • Dialysis patient ([facility])     mon wed fri   • DM2 (diabetes mellitus, type 2) ([facility])    • GERD (gastroesophageal reflux disease)    • Hyperlipidemia    • Hypertension    • Hypogonadism in male    • Neuropathy    • Nonsustained ventricular tachycardia ([facility]) 7/23/2021   • Obesity    • Paroxysmal atrial fibrillation ([facility]) 12/1/2020   • Sleep apnea    • Stroke ([facility])    • Unsteady gait        Past Surgical History:   Procedure Laterality Date   • ANGIOPLASTY      X2   • APPENDECTOMY     •  ARTERIOVENOUS FISTULA/SHUNT SURGERY Left 1/20/2022    Procedure: LEFT BRACHIAL CEPHALIC ARTERIAL VENOUS FISTULA CREATION;  Surgeon: Yony Davila MD;  Location: [facility] MAIN OR;  Service: Vascular;  Laterality: Left;   • CARDIAC CATHETERIZATION  11/13/2015   • CARDIAC CATHETERIZATION N/A 5/24/2021    Procedure: Left Heart Cath and coronary angiogram;  Surgeon: Jose G Rm MD;  Location:  [facility] CATH INVASIVE LOCATION;  Service: Cardiovascular;  Laterality: N/A;   • CARDIAC CATHETERIZATION  5/24/2021    Procedure: Saphenous Vein Graft;  Surgeon: Jose G Rm MD;  Location:  [facility] CATH INVASIVE LOCATION;  Service: Cardiovascular;;   • CARDIAC CATHETERIZATION N/A 5/24/2021    Procedure: Percutaneous Coronary Intervention;  Surgeon: Bernabe Zambrano MD;  Location:  [facility] CATH INVASIVE LOCATION;  Service: Cardiology;  Laterality: N/A;   • CARDIAC CATHETERIZATION N/A 5/24/2021    Procedure: Stent NIELS coronary;  Surgeon: Bernabe Zambrano MD;  Location:  [facility] CATH INVASIVE LOCATION;  Service: Cardiology;  Laterality: N/A;   • CARDIAC CATHETERIZATION N/A 5/26/2021    Procedure: Percutaneous Coronary Intervention;  Surgeon: Bernabe Zambrano MD;  Location:  [facility] CATH INVASIVE LOCATION;  Service: Cardiovascular;  Laterality: N/A;   • CARDIAC CATHETERIZATION N/A 5/26/2021    Procedure: Stent NIELS bypass graft;  Surgeon: Bernabe Zambrano MD;  Location:  [facility] CATH INVASIVE LOCATION;  Service: Cardiovascular;  Laterality: N/A;   • CARDIAC CATHETERIZATION N/A 5/13/2022    Procedure: Left Heart Cath and coronary angiogram;  Surgeon: Jose G Rm MD;  Location:  [facility] CATH INVASIVE LOCATION;  Service: Cardiovascular;  Laterality: N/A;   • CARDIAC CATHETERIZATION  5/13/2022    Procedure: Saphenous Vein Graft;  Surgeon: Jose G Rm MD;  Location:  [facility] CATH INVASIVE LOCATION;  Service: Cardiovascular;;   • CARDIAC CATHETERIZATION N/A 5/13/2022    Procedure: Stent NIELS coronary;  Surgeon: Estefany Hyde  MD;  Location: [facility] CATH INVASIVE LOCATION;  Service: Cardiovascular;  Laterality: N/A;   • CARDIAC ELECTROPHYSIOLOGY PROCEDURE N/A 5/24/2021    Procedure: Temporary Pacemaker;  Surgeon: Bernabe Zambrano MD;  Location: [facility] CATH INVASIVE LOCATION;  Service: Cardiology;  Laterality: N/A;   • CARDIAC ELECTROPHYSIOLOGY PROCEDURE N/A 5/26/2021    Procedure: Temporary Pacemaker;  Surgeon: Bernabe Zambrano MD;  Location: [facility] CATH INVASIVE LOCATION;  Service: Cardiovascular;  Laterality: N/A;   • CARPAL TUNNEL RELEASE Left 04/29/2018    carpal tunnel- lt hand// other hand surgeries    • CATARACT EXTRACTION, BILATERAL  2002    Dr. Lux Acosta   • CHOLECYSTECTOMY     • COLON RESECTION  2005   • COLONOSCOPY N/A 5/10/2022    Procedure: COLONOSCOPY with polypectomy x3;  Surgeon: Herbie Keane MD;  Location: [facility] ENDOSCOPY;  Service: Gastroenterology;  Laterality: N/A;  colon polyps;internal hemorrhoids   • CORONARY ANGIOPLASTY     • CORONARY ANGIOPLASTY WITH STENT PLACEMENT  11/13/2015    PTCA stent to proximal in stent and mid to distal lad   • CORONARY ANGIOPLASTY WITH STENT PLACEMENT  09/16/2016    PTCA stent to mid lad and stent to vein graft to marginal   • CORONARY ARTERY BYPASS GRAFT  2005    @ [facility]   • CYST REMOVAL      cyst removed from scrotum   • FOOT SURGERY Right 07/17/2018   • FOOT SURGERY Left 02/04/2019   • PORTACATH PLACEMENT     • TOE AMPUTATION Right     right toe removed D/T infected cut that went to the bone       Family History   Problem Relation Age of Onset   • Heart disease Mother    • Heart disease Father    • Diabetes Sister    • Heart disease Sister    • Diabetes Brother    • Mental illness Brother        Social History     Tobacco Use   • Smoking status: Former Smoker     Packs/day: 1.00     Years: 60.00     Pack years: 60.00     Types: Cigarettes   • Smokeless tobacco: Never Used   • Tobacco comment: Patient quit smoking 11/2020   Vaping Use   • Vaping Use: Never used   Substance Use Topics    • Alcohol use: No   • Drug use: Yes     Frequency: 1.0 times per week     Types: Marijuana     Comment: socially        Medications Prior to Admission   Medication Sig Dispense Refill Last Dose   • albuterol sulfate  (90 Base) MCG/ACT inhaler Inhale 2 puffs Every 4 (Four) Hours.   5/6/2022 at Unknown time   • apixaban (ELIQUIS) 5 MG tablet tablet Take 1 tablet by mouth Every 12 (Twelve) Hours. Indications: Atrial Fibrillation 60 tablet  5/6/2022 at Unknown time   • atorvastatin (LIPITOR) 40 MG tablet Take 40 mg by mouth Every Night.   5/5/2022 at Unknown time   • budesonide (Pulmicort) 0.5 MG/2ML nebulizer solution Take 2 mL by nebulization 2 (Two) Times a Day.   5/6/2022 at Unknown time   • cholecalciferol (VITAMIN D3) 25 MCG (1000 UT) tablet Take 1,000 Units by mouth Daily.   5/6/2022 at Unknown time   • docusate sodium (COLACE) 100 MG capsule Take 100 mg by mouth Daily.   5/6/2022 at Unknown time   • donepezil (ARICEPT) 10 MG tablet Take 10 mg by mouth Every Night.   5/5/2022 at Unknown time   • fluticasone (FLONASE) 50 MCG/ACT nasal spray 2 sprays by Each Nare route 2 (Two) Times a Day.   5/6/2022 at Unknown time   • guaiFENesin (MUCINEX) 600 MG 12 hr tablet Take 600 mg by mouth 2 (Two) Times a Day.   5/6/2022 at Unknown time   • HYDROcodone-acetaminophen (NORCO) 5-325 MG per tablet Take 1 tablet by mouth 3 (Three) Times a Day.   5/6/2022 at Unknown time   • ipratropium-albuterol (DUO-NEB) 0.5-2.5 mg/3 ml nebulizer Take 3 mL by nebulization 3 (Three) Times a Day. 360 mL  5/6/2022 at Unknown time   • ipratropium-albuterol (DUO-NEB) 0.5-2.5 mg/3 ml nebulizer Take 3 mL by nebulization Every 4 (Four) Hours As Needed for Wheezing. 360 mL  5/6/2022 at Unknown time   • levothyroxine (SYNTHROID, LEVOTHROID) 50 MCG tablet Take 50 mcg by mouth Every Morning.   5/6/2022 at Unknown time   • Metoprolol Tartrate 37.5 MG tablet Take 37.5 mg by mouth 2 (Two) Times a Day. Hold for SBP <110 or HR < 60   5/6/2022 at  Unknown time   • midodrine (PROAMATINE) 10 MG tablet Take 10 mg by mouth 3 (Three) Times a Day Before Meals. Hold for BP > 140/90   5/6/2022 at Unknown time   • omeprazole (priLOSEC) 20 MG capsule Take 20 mg by mouth Daily.   5/6/2022 at Unknown time   • polyethylene glycol (MiraLax) 17 g packet Take 17 g by mouth 2 (Two) Times a Day.   5/6/2022 at Unknown time   • sertraline (ZOLOFT) 50 MG tablet Take 50 mg by mouth Daily.   5/6/2022 at Unknown time   • vitamin B-12 (CYANOCOBALAMIN) 1000 MCG tablet Take 1,000 mcg by mouth Daily.   5/6/2022 at Unknown time   • bisacodyl (DULCOLAX) 10 MG suppository Insert 10 mg into the rectum Daily As Needed for Constipation.   Unknown at Unknown time   • magnesium hydroxide (MILK OF MAGNESIA) 400 MG/5ML suspension Take 30 mL by mouth Daily As Needed for Constipation.   Unknown at Unknown time   • ondansetron (ZOFRAN) 4 MG tablet Take 4 mg by mouth Every 8 (Eight) Hours As Needed for Nausea or Vomiting.   Unknown at Unknown time   • phenylephrine-mineral oil-petrolatum (Preparation H) 0.25-14-74.9 % ointment hemorrhoidal ointment Insert 1 application into the rectum Daily As Needed.   Unknown at Unknown time       Allergies  Codeine    Scheduled Meds:amiodarone, 200 mg, Oral, Q24H  apixaban, 5 mg, Oral, Q12H  aspirin, 81 mg, Oral, Daily  atorvastatin, 40 mg, Oral, Nightly  budesonide, 0.5 mg, Nebulization, BID  cefTRIAXone, 2 g, Intravenous, Q24H  clopidogrel, 75 mg, Oral, Daily  donepezil, 10 mg, Oral, Nightly  insulin lispro, 0-7 Units, Subcutaneous, TID AC  ipratropium-albuterol, 3 mL, Nebulization, TID - RT  levothyroxine, 50 mcg, Oral, Q AM  lidocaine, 20 mL, Intradermal, Once  metoprolol tartrate, 25 mg, Oral, BID  midodrine, 15 mg, Oral, TID AC  pantoprazole, 40 mg, Oral, QAM  polyethylene glycol, 17 g, Oral, BID  sertraline, 50 mg, Oral, Daily  sodium chloride, 10 mL, Intravenous, Q12H  sodium chloride, 10 mL, Intravenous, Q12H  sorbitol, 50 mL, Oral, Daily      Continuous  "Infusions:   PRN Meds:.•  acetaminophen **OR** acetaminophen **OR** acetaminophen  •  acetaminophen  •  aluminum-magnesium hydroxide-simethicone  •  atropine  •  dextrose  •  dextrose  •  glucagon (human recombinant)  •  heparin (porcine)  •  insulin lispro **AND** insulin lispro  •  ipratropium-albuterol  •  melatonin  •  Morphine  •  ondansetron **OR** ondansetron  •  sodium chloride  •  sodium chloride  •  sodium chloride  •  sodium chloride    Objective     VITAL SIGNS  Vitals:    05/16/22 2230 05/17/22 0202 05/17/22 0500 05/17/22 0507   BP: (!) 95/33 104/48  115/56   BP Location: Right arm Right arm  Right arm   Patient Position: Lying Lying  Lying   Pulse: 62 63  65   Resp: 18 18  20   Temp: 97.6 °F (36.4 °C) 97.7 °F (36.5 °C)  97.6 °F (36.4 °C)   TempSrc: Oral Oral  Oral   SpO2: 100% 100%  100%   Weight:   103 kg (227 lb 15.5 oz)    Height:           Flowsheet Rows    Flowsheet Row First Filed Value   Admission Height 177.8 cm (70\") Documented at 05/06/2022 1026   Admission Weight 104 kg (229 lb 0.9 oz) Documented at 05/06/2022 1026            Intake/Output Summary (Last 24 hours) at 5/17/2022 0647  Last data filed at 5/17/2022 0402  Gross per 24 hour   Intake 530 ml   Output 200 ml   Net 330 ml        TELEMETRY: Atrial fibrillation    Physical Exam:  The patient is alert, oriented and in no distress.  Mild confusion.  Vital signs as noted above.  Exogenous obesity.  Head and neck revealed no carotid bruits or jugular venous distention.  No thyromegaly or lymphadenopathy is present  Lungs clear.  No wheezing.  Breath sounds are normal bilaterally.  Heart normal first and second heart sounds.  No murmur. No precordial rub is present.  No gallop is present.  Abdomen soft and nontender.  No organomegaly is present.  Extremities with good peripheral pulses without any pedal edema.  Cardiac cath site looks normal.  Skin warm and dry.  Musculoskeletal system is grossly normal  CNS grossly normal except for mild " confusion      Results Review:   I reviewed the patient's new clinical results.  Lab Results (last 24 hours)     Procedure Component Value Units Date/Time    Renal Function Panel [[id number]]  (Abnormal) Collected: 05/17/22 0149    Specimen: Blood Updated: 05/17/22 0226     Glucose 111 mg/dL      BUN 11 mg/dL      Creatinine 2.38 mg/dL      Sodium 130 mmol/L      Potassium 4.2 mmol/L      Comment: Slight hemolysis detected by analyzer. Results may be affected.        Chloride 95 mmol/L      CO2 26.0 mmol/L      Calcium 7.9 mg/dL      Albumin 2.70 g/dL      Phosphorus 2.9 mg/dL      Anion Gap 9.0 mmol/L      BUN/Creatinine Ratio 4.6     eGFR 28.4 mL/min/1.73      Comment: National Kidney Foundation and American Society of Nephrology (ASN) Task Force recommended calculation based on the Chronic Kidney Disease Epidemiology Collaboration (CKD-EPI) equation refit without adjustment for race.       Narrative:      GFR Normal >60  Chronic Kidney Disease <60  Kidney Failure <15      C-reactive Protein [[id number]]  (Abnormal) Collected: 05/17/22 0149    Specimen: Blood Updated: 05/17/22 0225     C-Reactive Protein 3.12 mg/dL     Magnesium [[id number]]  (Normal) Collected: 05/17/22 0149    Specimen: Blood Updated: 05/17/22 0225     Magnesium 2.0 mg/dL     Manual Differential [[id number]] Collected: 05/17/22 0149    Specimen: Blood Updated: 05/17/22 0221     Neutrophil % 61.0 %      Lymphocyte % 22.0 %      Monocyte % 10.0 %      Eosinophil % 2.0 %      Bands %  5.0 %      Neutrophils Absolute 3.23 10*3/mm3      Lymphocytes Absolute 1.08 10*3/mm3      Monocytes Absolute 0.49 10*3/mm3      Eosinophils Absolute 0.10 10*3/mm3      Anisocytosis Slight/1+     Ovalocytes Slight/1+     Poikilocytes Slight/1+     WBC Morphology Normal     Platelet Morphology Normal    CBC & Differential [[id number]]  (Abnormal) Collected: 05/17/22 0149    Specimen: Blood Updated: 05/17/22 0221    Narrative:      The following orders were created for panel  order CBC & Differential.  Procedure                               Abnormality         Status                     ---------                               -----------         ------                     CBC Auto Differential[[id number]]        Abnormal            Final result               Scan Slide[[id number]]                                       Final result                 Please view results for these tests on the individual orders.    Scan Slide [[id number]] Collected: 05/17/22 0149    Specimen: Blood Updated: 05/17/22 0221     Scan Slide --     Comment: See Manual Differential Results       CBC Auto Differential [[id number]]  (Abnormal) Collected: 05/17/22 0149    Specimen: Blood Updated: 05/17/22 0221     WBC 4.90 10*3/mm3      RBC 3.51 10*6/mm3      Hemoglobin 10.1 g/dL      Hematocrit 33.3 %      MCV 95.0 fL      MCH 28.8 pg      MCHC 30.3 g/dL      RDW 17.6 %      RDW-SD 59.5 fl      MPV 8.0 fL      Platelets 195 10*3/mm3     Narrative:      The previously reported component NRBC is no longer being reported. Previous result was 0.1 /100 WBC (Reference Range: 0.0-0.2 /100 WBC) on 5/17/2022 at 0157 EDT.    POC Glucose Once [[id number]]  (Abnormal) Collected: 05/16/22 2101    Specimen: Blood Updated: 05/16/22 2102     Glucose 112 mg/dL      Comment: Serial Number: [id number]:  [id number]       POC Glucose Once [[id number]]  (Abnormal) Collected: 05/16/22 2042    Specimen: Blood Updated: 05/16/22 2044     Glucose 67 mg/dL      Comment: Serial Number: [id number]:  [id number]       POC Glucose Once [[id number]]  (Normal) Collected: 05/16/22 1607    Specimen: Blood Updated: 05/16/22 1608     Glucose 86 mg/dL      Comment: Serial Number: [id number]:  [id number]       POC Glucose Once [[id number]]  (Normal) Collected: 05/16/22 1248    Specimen: Blood Updated: 05/16/22 1249     Glucose 79 mg/dL      Comment: Serial Number: [id number]:  [id number]       POC Glucose Once [[id number]]  (Normal)  Collected: 05/16/22 0710    Specimen: Blood Updated: 05/16/22 0711     Glucose 90 mg/dL      Comment: Serial Number: [id number]:  [id number]             Imaging Results (Last 24 Hours)     ** No results found for the last 24 hours. **      LAB RESULTS (LAST 7 DAYS)    CBC  Results from last 7 days   Lab Units 05/17/22  0149 05/16/22  0113 05/15/22  0344 05/14/22  0837 05/13/22  0408 05/12/22  0501 05/11/22  0555   WBC 10*3/mm3 4.90 5.80 5.40 4.80 6.70 7.20 11.20*   RBC 10*6/mm3 3.51* 3.49* 3.45* 3.54* 3.58* 3.47* 3.71*   HEMOGLOBIN g/dL 10.1* 10.2* 10.2* 10.1* 10.4* 10.3* 10.5*   HEMATOCRIT % 33.3* 33.6* 33.0* 33.9* 34.3* 32.7* 34.9*   MCV fL 95.0 96.4 95.8 95.8 96.0 94.2 94.0   PLATELETS 10*3/mm3 195 190 188 185 211 208 234       BMP  Results from last 7 days   Lab Units 05/17/22  0149 05/16/22  0113 05/15/22  0344 05/14/22  0837 05/13/22  0408 05/12/22  0949 05/12/22  0501 05/11/22  0555   SODIUM mmol/L 130* 133* 135* 136 133*  --  133* 133*   POTASSIUM mmol/L 4.2 4.6 4.6 4.7 4.4  --  4.2 3.2*   CHLORIDE mmol/L 95* 97* 99 100 98  --  100 97*   CO2 mmol/L 26.0 24.0 24.0 25.0 25.0  --  23.0 24.0   BUN mg/dL 11 16 13 10 16  --  12 21   CREATININE mg/dL 2.38* 3.11* 2.68* 2.09* 2.90*  --  2.48* 3.41*   GLUCOSE mg/dL 111* 119* 116* 137* 97  --  122* 167*   MAGNESIUM mg/dL 2.0  --   --  2.0 2.3 2.2 1.9 2.1   PHOSPHORUS mg/dL 2.9 4.0 3.8 3.3 3.2  --  2.0* 2.7       CMP   Results from last 7 days   Lab Units 05/17/22  0149 05/16/22  0113 05/15/22  0344 05/14/22  0837 05/13/22  0408 05/12/22  0501 05/11/22  0555 05/10/22  0706   SODIUM mmol/L 130* 133* 135* 136 133* 133* 133* 135*   POTASSIUM mmol/L 4.2 4.6 4.6 4.7 4.4 4.2 3.2* 4.1   CHLORIDE mmol/L 95* 97* 99 100 98 100 97* 97*   CO2 mmol/L 26.0 24.0 24.0 25.0 25.0 23.0 24.0 24.0   BUN mg/dL 11 16 13 10 16 12 21 17   CREATININE mg/dL 2.38* 3.11* 2.68* 2.09* 2.90* 2.48* 3.41* 2.86*   GLUCOSE mg/dL 111* 119* 116* 137* 97 122* 167* 132*   ALBUMIN g/dL 2.70* 3.00* 3.10*  3.10* 2.90* 3.00* 2.80* 2.90*   BILIRUBIN mg/dL  --  0.3  --  0.4 0.3 0.3 0.3 0.4   ALK PHOS U/L  --  55  --  57 49 45 46 56   AST (SGOT) U/L  --  9  --  20 11 12 10 12   ALT (SGPT) U/L  --  7  --  7 6 7 8 6         BNP        TROPONIN  Results from last 7 days   Lab Units 05/13/22  0408   TROPONIN T ng/mL 0.583*       CoAg  Results from last 7 days   Lab Units 05/13/22  0408 05/10/22  0706   INR  1.12* 1.09       Creatinine Clearance  Estimated Creatinine Clearance: 34.2 mL/min (A) (by C-G formula based on SCr of 2.38 mg/dL (H)).    ABG  Results from last 7 days   Lab Units 05/11/22  0253   PH, ARTERIAL pH units 7.362   PCO2, ARTERIAL mm Hg 47.0   PO2 ART mm Hg 100.1   O2 SATURATION ART % 97.4   BASE EXCESS ART mmol/L 0.7       Radiology  No radiology results for the last day        EKG                      I personally viewed and interpreted the patient's EKG/Telemetry data: Atrial fibrillation    ECHOCARDIOGRAM:    Results for orders placed during the hospital encounter of 05/06/22    Adult Transesophageal Echo (MARSHA) W/ Cont if Necessary Per Protocol    Interpretation Summary  Date of study  5/13/2022    Indications  Bacteremia  Assessment for bacterial endocarditis    Procedure  Anesthesia was provided by anesthesiologist with intravenous Diprivan.  MARSHA probe could be passed without difficulty.  Patient tolerated the procedure well.  No complications were noted.    Procedure performed  Transesophageal echocardiogram Doppler study (color continuous-wave and pulsed wave)    Results  Technically satisfactory study.  Mitral valve is structurally normal.  Tricuspid valve is normal.  Aortic valve is tricuspid and is normal.  Mild mitral aortic and tricuspid regurgitation is present.  Biatrial enlargement is present.  Left atrial appendage enlargement without clot.  Diffuse left ventricular enlargement and hypocontractility is present with ejection fraction of 25 to 30%.  No pericardial fusion or intracardiac thrombus  is present.  Right atrium right ventricle enlargement is present.  Bubble study revealed PFO.  No pericardial effusion or intracardiac thrombus is seen.    Impression  Biatrial and biventricular enlargement.  Diffuse left ventricular hypocontractility with ejection fraction of 25 to 30%.  Mild smoking effect in the left atrium.  Small PFO.  Mild mitral aortic and tricuspid regurgitation is present.          STRESS MYOVIEW:    Cardiolite (Tc-99m Sestamibi) stress test    CARDIAC CATHETERIZATION:            OTHER:         Assessment & Plan     Principal Problem:    Chronic systolic (congestive) heart failure (HCC)  Active Problems:    S/P CABG (coronary artery bypass graft)    Primary hypertension    Elevated troponin    ANNETTE treated with BiPAP    Major depressive disorder, recurrent, mild (HCC)    Mixed hyperlipidemia    Flaccid hemiplegia affecting right dominant side (HCC)    Diabetic neuropathy (HCC)    Chronic obstructive pulmonary disease with (acute) exacerbation (HCC)    Anemia of renal disease    End stage renal disease (HCC)    Chronic respiratory failure with hypoxia, on home oxygen therapy (HCC)    Other specified hypothyroidism    Fluid overload    Stage 5 chronic kidney disease on chronic dialysis (HCC)    Bacteremia    Normocytic anemia due to blood loss    Essential (primary) hypertension    Cardiopulmonary arrest with successful resuscitation (HCC)    Ventricular fibrillation (HCC)      [[[[[[[[[[[[[[[[[[[[[[[[[    Impression  ==============  -Shortness of breath     - Positive blood cultures for Streptococcus 2 out of 2 sets.  Rule out tunneled catheter infection.     -Acute on chronic fluid overload.      -status post CABG 2005. status post stent to LAD 2009    Status post stent to LAD 11/13/2015  Status post stent to mid LAD and SVG to marginal branch 09/16/2016.  Status post stent to mid LAD 5/24/2021  Status post stent to SVG to RCA 5/26/2021  Status post stent to proximal LAD  5/13/2022     Cardiac catheterization 5/13/2022 revealed  Left ventricle is significantly enlarged with severe and diffuse hypocontractility with ejection fraction of 20%.  Left main coronary artery is normal.  Left anterior descending artery has proximal 90% disease.  Mid segment stents are patent.  Circumflex coronary artery is totally occluded.  Right coronary artery is totally occluded.  SVG to marginal branch is totally and chronically occluded.  SVG to RCA is patent.  Left subclavian artery is normal.  Left internal mammary artery is not a graft and is normal.    Cardiac catheterization 5/24/2021 revealed  Left ventricle angiogram was not performed due to pre-existing renal dysfunction.  Left main coronary artery normal.  Left anterior descending artery has mid segment lengthy 90% disease within the previously placed stent.  Distal left into descending artery has diffuse disease.  Circumflex coronary artery is totally occluded.  (Chronic)  Right coronary artery is chronically occluded.  SVG to marginal branch (jump graft to OM1 and OM 2) is totally occluded (new finding compared to 2015.  SVG to PDA has distal radiolucency.  However no definite obstructive disease is present.       -status post myocardial infarction 2000 and 2002 .      -history of intermittent but mostly persistent atrial fibrillation     -Acute on chronic congestive heart failure.  Compensated at this time.     Recent echocardiogram showed left ventricle dysfunction with ejection fraction of 35%.     -History of right-sided weakness with left MCA territory stroke.-Improved    MARSHA 5/13/2022 revealed  Biatrial and biventricular enlargement.  Diffuse left ventricular hypocontractility with ejection fraction of 25 to 30%.  Mild smoking effect in the left atrium.  Small PFO.  Mild mitral aortic and tricuspid regurgitation is present.     Transesophageal echocardiogram 11/30/2020.  Biatrial enlargement.  Smoking effect in the left atrium and left  ventricle and left atrial appendage.  Mild mitral and aortic regurgitation.  Left ventricle enlargement with diffuse hypocontractility with ejection fraction of 35 to 40%.     Echocardiogram 11/27/2020 revealed left atrial enlargement left ventricle dysfunction with ejection fraction of 40%.  Negative bubble study.      -diabetes hypertension and sleep apnea.  ESRD.     -status post colon surgery (partial colectomy) appendectomy cholecystectomy right 4th toe removal and carpal tunnel surgery      -continued smoking 1 pack per day -abstinence from smoking      -allergy to codeine.  ============   Plan  ================  Shortness of breath  Positive blood cultures for Streptococcus 2 out of 2 sets.  Rule out tunneled catheter infection..  MARSHA 5/13/2022-negative for vegetations.     Status post CABG  Recent CODE BLUE and ventricular fibrillation.  Patient had cardiac catheterization and stent placement to proximal LAD 5/13/2022.  Patient is not having any angina pectoris or congestive heart failure     Atrial fibrillation-chronic  Rate is better controlled  Patient is on Coreg at 12.5 mg p.o. twice a day amiodarone and Cardizem.      ESRD  Patient is undergoing dialysis.  Patient had dialysis yesterday    Hypokalemia  k 3.2-supplements.  - Improved at 4.0       Anticoagulation  Patient was on Eliquis 5 mg twice a day.  Observe for toxic effects.  Eliquis was on hold.  Have discussed with attending physician for coordination of care.  Eliquis restarted.    Ischemic cardiomyopathy.  Recent ventricular fibrillation probably due to severe proximal left anterior descending artery disease.  Likely left ventricular dysfunction we will continue despite having recent stent placement.  Likely patient would benefit from ICD placement (single-chamber).  Patient has narrow QRS complex.  However would like to wait until the infection is cleared up.  Patient may benefit from LifeVest.  I am not sure however patient would comply with  wearing LifeVest.  We discussed with infectious disease regarding timing of placement of ICD.    Current medications include amiodarone 200 mg a day aspirin atorvastatin Plavix levothyroxine metoprolol 25 mg twice a day midodrine 15 mg 3 times daily pantoprazole.    Have discussed with attending nurse for coordination of care.     Follow-up labs ordered.     Further plan will depend on patient's progress.   ]]]]]]]]]]]]]]]]]]]]]]       Jose G Rm MD  05/17/22  06:47 EDT

## 2022-05-17 NOTE — PROGRESS NOTES
Daily Progress Note        Chronic systolic (congestive) heart failure (HCC)    S/P CABG (coronary artery bypass graft)    Primary hypertension    Elevated troponin    ANNETTE treated with BiPAP    Major depressive disorder, recurrent, mild (HCC)    Mixed hyperlipidemia    Flaccid hemiplegia affecting right dominant side ([facility])    Diabetic neuropathy ([facility])    Chronic obstructive pulmonary disease with (acute) exacerbation ([facility])    Anemia of renal disease    End stage renal disease ([facility])    Chronic respiratory failure with hypoxia, on home oxygen therapy ([facility])    Other specified hypothyroidism    Fluid overload    Stage 5 chronic kidney disease on chronic dialysis ([facility])    Bacteremia    Normocytic anemia due to blood loss    Essential (primary) hypertension    Cardiopulmonary arrest with successful resuscitation ([facility])    Ventricular fibrillation ([facility])      Assessment    S/p Code blue was in V-fib. was defibrillated x1.  Patient was given an epi x1, bicarb x2. Upon pulse checks patient was found to be with a pulse and in normal sinus with a left bundle. Patient was moaning and turning his head. He became more and more alert.  Patient did not require intubation.       He continued to oxygenate well with nasal cannula. Within about 5 minutes the patient was asking to sit up and asking for water     Bacteremia with Streptococcus gallolyticus previously called Streptococcus bovis could be associated with GI malignancy  5/7/2020 repeat blood cultures negative    5/13/2022 MARSHA to rule out endocarditis due to Streptococcus Gallolyticus  bacteria  Biatrial and biventricular enlargement.  Diffuse left ventricular hypocontractility with ejection fraction of 25 to 30%.  Mild smoking effect in the left atrium.  Small PFO.  Mild mitral aortic and tricuspid regurgitation is present.  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,   anemia of renal disease,   vitamin deficiencies,   dementia         Recommendations:     Antibiotics currently on Rocephin     Hemodialysis     BiPAP 12/5 to be used at night and as needed during the day    BP control, midodrine  Amiodarone PO  GI prophylaxis Protonix  Synthroid  Anticoagulation Eliquis    Cardiology planning ICD placement, to discuss with ID regarding timing of placement             LOS: 11 days     Subjective         Objective     Vital signs for last 24 hours:  Vitals:    05/16/22 2230 05/17/22 0202 05/17/22 0500 05/17/22 0507   BP: (!) 95/33 104/48  115/56   BP Location: Right arm Right arm  Right arm   Patient Position: Lying Lying  Lying   Pulse: 62 63  65   Resp: 18 18  20   Temp: 97.6 °F (36.4 °C) 97.7 °F (36.5 °C)  97.6 °F (36.4 °C)   TempSrc: Oral Oral  Oral   SpO2: 100% 100%  100%   Weight:   103 kg (227 lb 15.5 oz)    Height:           Intake/Output last 3 shifts:  I/O last 3 completed shifts:  In: 600 [P.O.:550; IV Piggyback:50]  Out: 100 [Other:100]  Intake/Output this shift:  I/O this shift:  In: 410 [P.O.:360; IV Piggyback:50]  Out: 100 [Urine:100]      Radiology  Imaging Results (Last 24 Hours)     ** No results found for the last 24 hours. **          Labs:  Results from last 7 days   Lab Units 05/17/22  0149   WBC 10*3/mm3 4.90   HEMOGLOBIN g/dL 10.1*   HEMATOCRIT % 33.3*   PLATELETS 10*3/mm3 195     Results from last 7 days   Lab Units 05/17/22 0149 05/16/22  0113   SODIUM mmol/L 130* 133*   POTASSIUM mmol/L 4.2 4.6   CHLORIDE mmol/L 95* 97*   CO2 mmol/L 26.0 24.0   BUN mg/dL 11 16   CREATININE mg/dL 2.38* 3.11*   CALCIUM mg/dL 7.9* 8.2*   BILIRUBIN mg/dL  --  0.3   ALK PHOS U/L  --  55   ALT (SGPT) U/L  --  7   AST (SGOT) U/L  --  9   GLUCOSE mg/dL 111* 119*     Results from last 7  days   Lab Units 05/11/22  0253   PH, ARTERIAL pH units 7.362   PO2 ART mm Hg 100.1   PCO2, ARTERIAL mm Hg 47.0   HCO3 ART mmol/L 26.7     Results from last 7 days   Lab Units 05/17/22  0149 05/16/22  0113 05/15/22  0344   ALBUMIN g/dL 2.70* 3.00* 3.10*     Results from last 7 days   Lab Units 05/13/22  0408   TROPONIN T ng/mL 0.583*         Results from last 7 days   Lab Units 05/17/22  0149   MAGNESIUM mg/dL 2.0     Results from last 7 days   Lab Units 05/13/22  0408 05/10/22  0706   INR  1.12* 1.09               Meds:   SCHEDULE  amiodarone, 200 mg, Oral, Q24H  apixaban, 5 mg, Oral, Q12H  aspirin, 81 mg, Oral, Daily  atorvastatin, 40 mg, Oral, Nightly  budesonide, 0.5 mg, Nebulization, BID  cefTRIAXone, 2 g, Intravenous, Q24H  clopidogrel, 75 mg, Oral, Daily  donepezil, 10 mg, Oral, Nightly  insulin lispro, 0-7 Units, Subcutaneous, TID AC  ipratropium-albuterol, 3 mL, Nebulization, TID - RT  levothyroxine, 50 mcg, Oral, Q AM  lidocaine, 20 mL, Intradermal, Once  metoprolol tartrate, 25 mg, Oral, BID  midodrine, 15 mg, Oral, TID AC  pantoprazole, 40 mg, Oral, QAM  polyethylene glycol, 17 g, Oral, BID  sertraline, 50 mg, Oral, Daily  sodium chloride, 10 mL, Intravenous, Q12H  sodium chloride, 10 mL, Intravenous, Q12H  sorbitol, 50 mL, Oral, Daily      Infusions     PRNs  •  acetaminophen **OR** acetaminophen **OR** acetaminophen  •  acetaminophen  •  aluminum-magnesium hydroxide-simethicone  •  atropine  •  dextrose  •  dextrose  •  glucagon (human recombinant)  •  heparin (porcine)  •  insulin lispro **AND** insulin lispro  •  ipratropium-albuterol  •  melatonin  •  Morphine  •  ondansetron **OR** ondansetron  •  sodium chloride  •  sodium chloride  •  sodium chloride  •  sodium chloride    Physical Exam:  Physical Exam  Vitals reviewed.   Pulmonary:      Breath sounds: Rales present.   Musculoskeletal:         General: Swelling present.   Skin:     General: Skin is warm and dry.   Neurological:      Mental  Status: He is alert.         ROS  Review of Systems   Respiratory: Positive for cough and shortness of breath.    Neurological: Positive for weakness.       I reviewed the patient's new clinical results    Electronically signed by FABIOLA Bui

## 2022-05-17 NOTE — NURSING NOTE
[employer] representative visited patient for life vest fitting and stated patient is not appropriate for life vest teaching. Patient is confused and unable to follow directions on the teaching required to operate the life vest. The [employer] representative felt it would be unsafe to proceed with Life vest. I spoke with Dr. Delvalle (on call for Dr. Rm) and life vest was cancelled.

## 2022-05-17 NOTE — PLAN OF CARE
Goal Outcome Evaluation:      Life vest ordered for patient. Plan to d/c to [place] once life vest set up. IV abx to end on 5/20. Pt scheduled for HD tomorrow.      Problem: Adult Inpatient Plan of Care  Goal: Plan of Care Review  Outcome: Ongoing, Progressing  Goal: Patient-Specific Goal (Individualized)  Outcome: Ongoing, Progressing  Goal: Absence of Hospital-Acquired Illness or Injury  Outcome: Ongoing, Progressing  Intervention: Identify and Manage Fall Risk  Recent Flowsheet Documentation  Taken 5/17/2022 1200 by Zainab Coy RN  Safety Promotion/Fall Prevention:   activity supervised   clutter free environment maintained   nonskid shoes/slippers when out of bed   safety round/check completed  Taken 5/17/2022 1000 by Zainab Coy RN  Safety Promotion/Fall Prevention: activity supervised  Taken 5/17/2022 0800 by Zainab Coy RN  Safety Promotion/Fall Prevention:   activity supervised   clutter free environment maintained   nonskid shoes/slippers when out of bed   safety round/check completed  Intervention: Prevent Skin Injury  Recent Flowsheet Documentation  Taken 5/17/2022 1200 by Zainab Coy RN  Skin Protection: incontinence pads utilized  Taken 5/17/2022 0800 by Zainab Coy RN  Skin Protection: incontinence pads utilized  Intervention: Prevent Infection  Recent Flowsheet Documentation  Taken 5/17/2022 1200 by Zainab Coy RN  Infection Prevention:   hand hygiene promoted   personal protective equipment utilized  Taken 5/17/2022 1000 by Zainab Coy RN  Infection Prevention:   hand hygiene promoted   personal protective equipment utilized  Taken 5/17/2022 0800 by Zainab Coy RN  Infection Prevention:   hand hygiene promoted   personal protective equipment utilized  Goal: Optimal Comfort and Wellbeing  Outcome: Ongoing, Progressing  Goal: Readiness for Transition of Care  Outcome: Ongoing, Progressing

## 2022-05-18 NOTE — PROGRESS NOTES
RENAL/[facility]:    Name: Benson Mclean  Age: 71 y.o.  : 1950  Sex: male    22    Subjective  Chart reviewed    Objective:    Vital Signs  Temp:  [97.5 °F (36.4 °C)-98.5 °F (36.9 °C)] 97.5 °F (36.4 °C)  Heart Rate:  [62-71] 69  Resp:  [12-20] 18  BP: (101-120)/(40-71) 120/47        Intake/Output Summary (Last 24 hours) at 2022 1352  Last data filed at 2022 1011  Gross per 24 hour   Intake 290 ml   Output --   Net 290 ml       Physical Exam  Physical Exam  Constitutional:       General: He is not in acute distress.     Appearance: He is not diaphoretic.   HENT:      Head: Normocephalic and atraumatic.      Nose: Nose normal.   Eyes:      Pupils: Pupils are equal, round, and reactive to light.   Neck:      Thyroid: No thyromegaly.      Vascular: No JVD.      Trachea: No tracheal deviation.   Cardiovascular:      Rate and Rhythm: Normal rate and regular rhythm.      Heart sounds: No murmur heard.    No friction rub. No gallop.   Pulmonary:      Effort: Pulmonary effort is normal. No respiratory distress.      Breath sounds: No stridor. No wheezing or rales.   Chest:      Chest wall: No tenderness.   Abdominal:      General: Bowel sounds are normal.      Palpations: Abdomen is soft. There is no mass.      Tenderness: There is no abdominal tenderness. There is no guarding or rebound.      Hernia: No hernia is present.   Musculoskeletal:         General: No tenderness or deformity. Normal range of motion.      Cervical back: Normal range of motion and neck supple.   Skin:     General: Skin is warm and dry.      Coloration: Skin is not pale.      Findings: No rash.   Neurological:      Mental Status: He is alert and oriented to person, place, and time.      Cranial Nerves: No cranial nerve deficit.      Motor: No abnormal muscle tone.      Coordination: Coordination normal.      Deep Tendon Reflexes: Reflexes are normal and symmetric. Reflexes normal.   Psychiatric:         Behavior: Behavior normal.          Thought Content: Thought content normal.         Judgment: Judgment normal.            Results Review:      Results from last 7 days   Lab Units 05/18/22  0330 05/17/22  0149 05/16/22  0113 05/15/22  0344 05/14/22 0837 05/13/22  0408 05/12/22  0501   SODIUM mmol/L 129* 130* 133* 135* 136 133* 133*   CO2 mmol/L 25.0 26.0 24.0 24.0 25.0 25.0 23.0   BUN mg/dL 16 11 16 13 10 16 12   CREATININE mg/dL 2.81* 2.38* 3.11* 2.68* 2.09* 2.90* 2.48*   CALCIUM mg/dL 7.9* 7.9* 8.2* 8.6 8.6 8.2* 8.1*   ALBUMIN g/dL 2.70* 2.70* 3.00* 3.10* 3.10* 2.90* 3.00*   AST (SGOT) U/L  --   --  9  --  20 11 12   ALT (SGPT) U/L  --   --  7  --  7 6 7   EGFR mL/min/1.73 23.3* 28.4* 20.6* 24.7* 33.2* 22.4* 27.1*       Results from last 7 days   Lab Units 05/18/22  0330 05/17/22 0149 05/16/22  0113 05/15/22  0344 05/14/22 0837 05/13/22  0408 05/12/22  0501   WBC 10*3/mm3 5.20 4.90 5.80 5.40 4.80 6.70 7.20   INR   --   --   --   --   --  1.12*  --          Medication Review:   amiodarone, 200 mg, Oral, Q24H  apixaban, 5 mg, Oral, Q12H  aspirin, 81 mg, Oral, Daily  atorvastatin, 40 mg, Oral, Nightly  budesonide, 0.5 mg, Nebulization, BID  cefTRIAXone, 2 g, Intravenous, Q24H  clopidogrel, 75 mg, Oral, Daily  donepezil, 10 mg, Oral, Nightly  insulin lispro, 0-7 Units, Subcutaneous, TID AC  ipratropium-albuterol, 3 mL, Nebulization, TID - RT  levothyroxine, 50 mcg, Oral, Q AM  lidocaine, 20 mL, Intradermal, Once  metoprolol tartrate, 25 mg, Oral, BID  midodrine, 15 mg, Oral, TID AC  pantoprazole, 40 mg, Oral, QAM  polyethylene glycol, 17 g, Oral, BID  sertraline, 50 mg, Oral, Daily  sodium chloride, 10 mL, Intravenous, Q12H  sodium chloride, 10 mL, Intravenous, Q12H  sorbitol, 50 mL, Oral, Daily          Assessment:    End-stage renal disease    Bacteremia    S/P CODE    A-fib - on Amio    SOA    CHF     History of CVA    History of coronary artery disease    Diabetes      Plan:  HD MWF as tolerated  BP support  ABX per ID - keep TDC but will  need exchanged if recurrent bacteremia  Will follow      Yony Bhat MD  05/18/22  13:52 EDT  Tel: [phone]  Fax: [phone]

## 2022-05-18 NOTE — PLAN OF CARE
Goal Outcome Evaluation:         Pt off the floor to dialysis at 1500. Patient has been confused today and only oriented to self. Pt not a candidate for life vest due to confusion and unable to operate vest. Cardiology plans to place ICD as outpatient. Possible d/c tomorrow to [place]. End date of abx is 5/20. Will continue to monitor.       Problem: Adult Inpatient Plan of Care  Goal: Plan of Care Review  Outcome: Ongoing, Progressing  Goal: Patient-Specific Goal (Individualized)  Outcome: Ongoing, Progressing  Goal: Absence of Hospital-Acquired Illness or Injury  Outcome: Ongoing, Progressing  Intervention: Identify and Manage Fall Risk  Recent Flowsheet Documentation  Taken 5/18/2022 1505 by Zainab Coy RN  Safety Promotion/Fall Prevention: (to dialysis) patient off unit  Taken 5/18/2022 1200 by Zainab Coy RN  Safety Promotion/Fall Prevention:   activity supervised   clutter free environment maintained   nonskid shoes/slippers when out of bed   safety round/check completed  Intervention: Prevent Skin Injury  Recent Flowsheet Documentation  Taken 5/18/2022 1200 by Zainab Coy RN  Skin Protection:   incontinence pads utilized   transparent dressing maintained  Taken 5/18/2022 0800 by Zainab Coy RN  Skin Protection:   incontinence pads utilized   skin-to-skin areas padded  Intervention: Prevent Infection  Recent Flowsheet Documentation  Taken 5/18/2022 1200 by Zainab Coy RN  Infection Prevention:   hand hygiene promoted   personal protective equipment utilized  Goal: Optimal Comfort and Wellbeing  Outcome: Ongoing, Progressing  Goal: Readiness for Transition of Care  Outcome: Ongoing, Progressing

## 2022-05-18 NOTE — PROGRESS NOTES
Referring Provider: Sunny Shannon MD    Reason for follow-up:  Shortness of breath  Recent ventricular fibrillation.  Bacteremia  Status post stent       Patient Care Team:  Rudolph Rooney MD as PCP - General (Family Medicine)  Samantha Zabala APRN as PCP - Family Medicine  Jose G Rm MD as Consulting Physician (Cardiology)    Subjective .  Feeling better  ROS  Mild confusion.  Since I have last seen him yesterday, the patient has been without any chest discomfort ,shortness of breath, palpitations, dizziness or syncope.  Denies having any headache ,abdominal pain ,nausea, vomiting , diarrhea constipation, loss of weight or loss of appetite.  Denies having any excessive bruising ,hematuria or blood in the stool.    Review of all systems negative except as indicated    History  Past Medical History:   Diagnosis Date   • A-fib ([facility]) 8/3/2021   • Anemia    • Appetite loss    • Arthritis    • Brain bleed ([facility])    • Carpal tunnel syndrome on left    • CHF (congestive heart failure) ([facility])    • Chronic left-sided low back pain without sciatica 12/27/2017   • CKD (chronic kidney disease) stage 3, GFR 30-59 ml/min ([facility])    • Closed fracture of seventh thoracic vertebra ([facility]) 8/23/2020   • Cognitive communication deficit 12/1/2020   • COPD (chronic obstructive pulmonary disease) ([facility])    • Coronary artery disease    • COVID-19 2/19/2021   • Cytokine release syndrome, grade 1 5/24/2021   • Depression    • Diabetic neuropathy ([facility])    • Dialysis patient ([facility])     mon wed fri   • DM2 (diabetes mellitus, type 2) ([facility])    • GERD (gastroesophageal reflux disease)    • Hyperlipidemia    • Hypertension    • Hypogonadism in male    • Neuropathy    • Nonsustained ventricular tachycardia ([facility]) 7/23/2021   • Obesity    • Paroxysmal atrial fibrillation ([facility]) 12/1/2020   • Sleep apnea    • Stroke ([facility])    • Unsteady gait        Past Surgical History:   Procedure Laterality Date   • ANGIOPLASTY      X2   • APPENDECTOMY     •  ARTERIOVENOUS FISTULA/SHUNT SURGERY Left 1/20/2022    Procedure: LEFT BRACHIAL CEPHALIC ARTERIAL VENOUS FISTULA CREATION;  Surgeon: Yony Davila MD;  Location: [facility] MAIN OR;  Service: Vascular;  Laterality: Left;   • CARDIAC CATHETERIZATION  11/13/2015   • CARDIAC CATHETERIZATION N/A 5/24/2021    Procedure: Left Heart Cath and coronary angiogram;  Surgeon: Jose G Rm MD;  Location:  [facility] CATH INVASIVE LOCATION;  Service: Cardiovascular;  Laterality: N/A;   • CARDIAC CATHETERIZATION  5/24/2021    Procedure: Saphenous Vein Graft;  Surgeon: Jose G Rm MD;  Location:  [facility] CATH INVASIVE LOCATION;  Service: Cardiovascular;;   • CARDIAC CATHETERIZATION N/A 5/24/2021    Procedure: Percutaneous Coronary Intervention;  Surgeon: Bernabe Zambrano MD;  Location:  [facility] CATH INVASIVE LOCATION;  Service: Cardiology;  Laterality: N/A;   • CARDIAC CATHETERIZATION N/A 5/24/2021    Procedure: Stent NIELS coronary;  Surgeon: Bernabe Zambrano MD;  Location:  [facility] CATH INVASIVE LOCATION;  Service: Cardiology;  Laterality: N/A;   • CARDIAC CATHETERIZATION N/A 5/26/2021    Procedure: Percutaneous Coronary Intervention;  Surgeon: Bernabe Zambrano MD;  Location:  [facility] CATH INVASIVE LOCATION;  Service: Cardiovascular;  Laterality: N/A;   • CARDIAC CATHETERIZATION N/A 5/26/2021    Procedure: Stent NIELS bypass graft;  Surgeon: Bernabe Zambrano MD;  Location:  [facility] CATH INVASIVE [facility];  Service: Cardiovascular;  Laterality: N/A;   • CARDIAC CATHETERIZATION N/A 5/13/2022    Procedure: Left Heart Cath and coronary angiogram;  Surgeon: Jose G Rm MD;  Location:  [facility] CATH INVASIVE LOCATION;  Service: Cardiovascular;  Laterality: N/A;   • CARDIAC CATHETERIZATION  5/13/2022    Procedure: Saphenous Vein Graft;  Surgeon: Jose G Rm MD;  Location:  [facility] CATH INVASIVE LOCATION;  Service: Cardiovascular;;   • CARDIAC CATHETERIZATION N/A 5/13/2022    Procedure: Stent NIELS coronary;  Surgeon: Estefany Hyde  MD;  Location: [facility] CATH INVASIVE LOCATION;  Service: Cardiovascular;  Laterality: N/A;   • CARDIAC ELECTROPHYSIOLOGY PROCEDURE N/A 5/24/2021    Procedure: Temporary Pacemaker;  Surgeon: Bernabe Zambrano MD;  Location: [facility] CATH INVASIVE LOCATION;  Service: Cardiology;  Laterality: N/A;   • CARDIAC ELECTROPHYSIOLOGY PROCEDURE N/A 5/26/2021    Procedure: Temporary Pacemaker;  Surgeon: Bernabe Zambrano MD;  Location: [facility] CATH INVASIVE LOCATION;  Service: Cardiovascular;  Laterality: N/A;   • CARPAL TUNNEL RELEASE Left 04/29/2018    carpal tunnel- lt hand// other hand surgeries    • CATARACT EXTRACTION, BILATERAL  2002    Dr. Lux Acosta   • CHOLECYSTECTOMY     • COLON RESECTION  2005   • COLONOSCOPY N/A 5/10/2022    Procedure: COLONOSCOPY with polypectomy x3;  Surgeon: Herbie Keane MD;  Location: [facility] ENDOSCOPY;  Service: Gastroenterology;  Laterality: N/A;  colon polyps;internal hemorrhoids   • CORONARY ANGIOPLASTY     • CORONARY ANGIOPLASTY WITH STENT PLACEMENT  11/13/2015    PTCA stent to proximal in stent and mid to distal lad   • CORONARY ANGIOPLASTY WITH STENT PLACEMENT  09/16/2016    PTCA stent to mid lad and stent to vein graft to marginal   • CORONARY ARTERY BYPASS GRAFT  2005    @ [facility]   • CYST REMOVAL      cyst removed from scrotum   • FOOT SURGERY Right 07/17/2018   • FOOT SURGERY Left 02/04/2019   • PORTACATH PLACEMENT     • TOE AMPUTATION Right     right toe removed D/T infected cut that went to the bone       Family History   Problem Relation Age of Onset   • Heart disease Mother    • Heart disease Father    • Diabetes Sister    • Heart disease Sister    • Diabetes Brother    • Mental illness Brother        Social History     Tobacco Use   • Smoking status: Former Smoker     Packs/day: 1.00     Years: 60.00     Pack years: 60.00     Types: Cigarettes   • Smokeless tobacco: Never Used   • Tobacco comment: Patient quit smoking 11/2020   Vaping Use   • Vaping Use: Never used   Substance Use Topics    • Alcohol use: No   • Drug use: Yes     Frequency: 1.0 times per week     Types: Marijuana     Comment: socially        Medications Prior to Admission   Medication Sig Dispense Refill Last Dose   • albuterol sulfate  (90 Base) MCG/ACT inhaler Inhale 2 puffs Every 4 (Four) Hours.   5/6/2022 at Unknown time   • apixaban (ELIQUIS) 5 MG tablet tablet Take 1 tablet by mouth Every 12 (Twelve) Hours. Indications: Atrial Fibrillation 60 tablet  5/6/2022 at Unknown time   • atorvastatin (LIPITOR) 40 MG tablet Take 40 mg by mouth Every Night.   5/5/2022 at Unknown time   • budesonide (Pulmicort) 0.5 MG/2ML nebulizer solution Take 2 mL by nebulization 2 (Two) Times a Day.   5/6/2022 at Unknown time   • cholecalciferol (VITAMIN D3) 25 MCG (1000 UT) tablet Take 1,000 Units by mouth Daily.   5/6/2022 at Unknown time   • docusate sodium (COLACE) 100 MG capsule Take 100 mg by mouth Daily.   5/6/2022 at Unknown time   • donepezil (ARICEPT) 10 MG tablet Take 10 mg by mouth Every Night.   5/5/2022 at Unknown time   • fluticasone (FLONASE) 50 MCG/ACT nasal spray 2 sprays by Each Nare route 2 (Two) Times a Day.   5/6/2022 at Unknown time   • guaiFENesin (MUCINEX) 600 MG 12 hr tablet Take 600 mg by mouth 2 (Two) Times a Day.   5/6/2022 at Unknown time   • HYDROcodone-acetaminophen (NORCO) 5-325 MG per tablet Take 1 tablet by mouth 3 (Three) Times a Day.   5/6/2022 at Unknown time   • ipratropium-albuterol (DUO-NEB) 0.5-2.5 mg/3 ml nebulizer Take 3 mL by nebulization 3 (Three) Times a Day. 360 mL  5/6/2022 at Unknown time   • ipratropium-albuterol (DUO-NEB) 0.5-2.5 mg/3 ml nebulizer Take 3 mL by nebulization Every 4 (Four) Hours As Needed for Wheezing. 360 mL  5/6/2022 at Unknown time   • levothyroxine (SYNTHROID, LEVOTHROID) 50 MCG tablet Take 50 mcg by mouth Every Morning.   5/6/2022 at Unknown time   • Metoprolol Tartrate 37.5 MG tablet Take 37.5 mg by mouth 2 (Two) Times a Day. Hold for SBP <110 or HR < 60   5/6/2022 at  Unknown time   • midodrine (PROAMATINE) 10 MG tablet Take 10 mg by mouth 3 (Three) Times a Day Before Meals. Hold for BP > 140/90   5/6/2022 at Unknown time   • omeprazole (priLOSEC) 20 MG capsule Take 20 mg by mouth Daily.   5/6/2022 at Unknown time   • polyethylene glycol (MiraLax) 17 g packet Take 17 g by mouth 2 (Two) Times a Day.   5/6/2022 at Unknown time   • sertraline (ZOLOFT) 50 MG tablet Take 50 mg by mouth Daily.   5/6/2022 at Unknown time   • vitamin B-12 (CYANOCOBALAMIN) 1000 MCG tablet Take 1,000 mcg by mouth Daily.   5/6/2022 at Unknown time   • bisacodyl (DULCOLAX) 10 MG suppository Insert 10 mg into the rectum Daily As Needed for Constipation.   Unknown at Unknown time   • magnesium hydroxide (MILK OF MAGNESIA) 400 MG/5ML suspension Take 30 mL by mouth Daily As Needed for Constipation.   Unknown at Unknown time   • ondansetron (ZOFRAN) 4 MG tablet Take 4 mg by mouth Every 8 (Eight) Hours As Needed for Nausea or Vomiting.   Unknown at Unknown time   • phenylephrine-mineral oil-petrolatum (Preparation H) 0.25-14-74.9 % ointment hemorrhoidal ointment Insert 1 application into the rectum Daily As Needed.   Unknown at Unknown time       Allergies  Codeine    Scheduled Meds:amiodarone, 200 mg, Oral, Q24H  apixaban, 5 mg, Oral, Q12H  aspirin, 81 mg, Oral, Daily  atorvastatin, 40 mg, Oral, Nightly  budesonide, 0.5 mg, Nebulization, BID  cefTRIAXone, 2 g, Intravenous, Q24H  clopidogrel, 75 mg, Oral, Daily  donepezil, 10 mg, Oral, Nightly  insulin lispro, 0-7 Units, Subcutaneous, TID AC  ipratropium-albuterol, 3 mL, Nebulization, TID - RT  levothyroxine, 50 mcg, Oral, Q AM  lidocaine, 20 mL, Intradermal, Once  metoprolol tartrate, 25 mg, Oral, BID  midodrine, 15 mg, Oral, TID AC  pantoprazole, 40 mg, Oral, QAM  polyethylene glycol, 17 g, Oral, BID  sertraline, 50 mg, Oral, Daily  sodium chloride, 10 mL, Intravenous, Q12H  sodium chloride, 10 mL, Intravenous, Q12H  sorbitol, 50 mL, Oral, Daily      Continuous  "Infusions:   PRN Meds:.•  acetaminophen **OR** acetaminophen **OR** acetaminophen  •  acetaminophen  •  aluminum-magnesium hydroxide-simethicone  •  atropine  •  dextrose  •  dextrose  •  glucagon (human recombinant)  •  heparin (porcine)  •  insulin lispro **AND** insulin lispro  •  ipratropium-albuterol  •  melatonin  •  ondansetron **OR** ondansetron  •  sodium chloride  •  sodium chloride  •  sodium chloride  •  sodium chloride    Objective     VITAL SIGNS  Vitals:    05/18/22 0629 05/18/22 0633 05/18/22 0634 05/18/22 0639   BP:       BP Location:       Patient Position:       Pulse: 65 63 63 69   Resp: 16 16 16 16   Temp:       TempSrc:       SpO2: 96% 98% 98% 100%   Weight:       Height:           Flowsheet Rows    Flowsheet Row First Filed Value   Admission Height 177.8 cm (70\") Documented at 05/06/2022 1026   Admission Weight 104 kg (229 lb 0.9 oz) Documented at 05/06/2022 1026            Intake/Output Summary (Last 24 hours) at 5/18/2022 0644  Last data filed at 5/18/2022 0331  Gross per 24 hour   Intake 530 ml   Output --   Net 530 ml        TELEMETRY: Atrial fibrillation    Physical Exam:  The patient is not in distress.  Confusion.  Vital signs as noted above.  Exogenous obesity.  Head and neck revealed no carotid bruits or jugular venous distention.  No thyromegaly or lymphadenopathy is present  Lungs clear.  No wheezing.  Breath sounds are normal bilaterally.  Heart normal first and second heart sounds.  No murmur. No precordial rub is present.  No gallop is present.  Abdomen soft and nontender.  No organomegaly is present.  Extremities with good peripheral pulses without any pedal edema.  Cardiac cath site looks normal.  Skin warm and dry.  Musculoskeletal system is grossly normal  CNS grossly normal except for mild confusion      Results Review:   I reviewed the patient's new clinical results.  Lab Results (last 24 hours)     Procedure Component Value Units Date/Time    Renal Function Panel " [[id number]]  (Abnormal) Collected: 05/18/22 0330    Specimen: Blood Updated: 05/18/22 0435     Glucose 106 mg/dL      BUN 16 mg/dL      Creatinine 2.81 mg/dL      Sodium 129 mmol/L      Potassium 4.5 mmol/L      Chloride 94 mmol/L      CO2 25.0 mmol/L      Calcium 7.9 mg/dL      Albumin 2.70 g/dL      Phosphorus 3.4 mg/dL      Anion Gap 10.0 mmol/L      BUN/Creatinine Ratio 5.7     eGFR 23.3 mL/min/1.73      Comment: National Kidney Foundation and American Society of Nephrology (ASN) Task Force recommended calculation based on the Chronic Kidney Disease Epidemiology Collaboration (CKD-EPI) equation refit without adjustment for race.       Narrative:      GFR Normal >60  Chronic Kidney Disease <60  Kidney Failure <15      Magnesium [[id number]]  (Normal) Collected: 05/18/22 0330    Specimen: Blood Updated: 05/18/22 0435     Magnesium 1.9 mg/dL     C-reactive Protein [[id number]]  (Abnormal) Collected: 05/18/22 0330    Specimen: Blood Updated: 05/18/22 0435     C-Reactive Protein 4.03 mg/dL     CBC & Differential [[id number]]  (Abnormal) Collected: 05/18/22 0330    Specimen: Blood Updated: 05/18/22 0416    Narrative:      The following orders were created for panel order CBC & Differential.  Procedure                               Abnormality         Status                     ---------                               -----------         ------                     CBC Auto Differential[[id number]]        Abnormal            Final result                 Please view results for these tests on the individual orders.    CBC Auto Differential [[id number]]  (Abnormal) Collected: 05/18/22 0330    Specimen: Blood Updated: 05/18/22 0416     WBC 5.20 10*3/mm3      RBC 3.53 10*6/mm3      Hemoglobin 10.3 g/dL      Hematocrit 33.2 %      MCV 94.1 fL      MCH 29.2 pg      MCHC 31.1 g/dL      RDW 17.4 %      RDW-SD 58.6 fl      MPV 8.0 fL      Platelets 182 10*3/mm3      Neutrophil % 58.0 %      Lymphocyte % 29.5 %      Monocyte % 9.6 %       Eosinophil % 1.8 %      Basophil % 1.1 %      Neutrophils, Absolute 3.00 10*3/mm3      Lymphocytes, Absolute 1.50 10*3/mm3      Monocytes, Absolute 0.50 10*3/mm3      Eosinophils, Absolute 0.10 10*3/mm3      Basophils, Absolute 0.10 10*3/mm3      nRBC 0.1 /100 WBC     POC Glucose Once [[id number]]  (Abnormal) Collected: 05/17/22 2049    Specimen: Blood Updated: 05/17/22 2050     Glucose 119 mg/dL      Comment: Serial Number: [id number]:  [id number]       POC Glucose Once [[id number]]  (Abnormal) Collected: 05/17/22 1613    Specimen: Blood Updated: 05/17/22 1616     Glucose 120 mg/dL      Comment: Serial Number: [id number]:  [id number]       POC Glucose Once [[id number]]  (Abnormal) Collected: 05/17/22 1115    Specimen: Blood Updated: 05/17/22 1115     Glucose 122 mg/dL      Comment: Serial Number: [id number]:  [id number]       POC Glucose Once [[id number]]  (Abnormal) Collected: 05/17/22 0707    Specimen: Blood Updated: 05/17/22 0831     Glucose 113 mg/dL      Comment: Serial Number: [id number]:  [id number]       POC Glucose Once [[id number]]  (Normal) Collected: 05/15/22 1627    Specimen: Blood Updated: 05/17/22 0830     Glucose 99 mg/dL      Comment: Serial Number: [id number]:  [id number]             Imaging Results (Last 24 Hours)     ** No results found for the last 24 hours. **      LAB RESULTS (LAST 7 DAYS)    CBC  Results from last 7 days   Lab Units 05/18/22  0330 05/17/22  0149 05/16/22  0113 05/15/22  0344 05/14/22  0837 05/13/22  0408 05/12/22  0501   WBC 10*3/mm3 5.20 4.90 5.80 5.40 4.80 6.70 7.20   RBC 10*6/mm3 3.53* 3.51* 3.49* 3.45* 3.54* 3.58* 3.47*   HEMOGLOBIN g/dL 10.3* 10.1* 10.2* 10.2* 10.1* 10.4* 10.3*   HEMATOCRIT % 33.2* 33.3* 33.6* 33.0* 33.9* 34.3* 32.7*   MCV fL 94.1 95.0 96.4 95.8 95.8 96.0 94.2   PLATELETS 10*3/mm3 182 195 190 188 185 211 208       BMP  Results from last 7 days   Lab Units 05/18/22  0330 05/17/22  0149 05/16/22  0113 05/15/22  0344  05/14/22  0837 05/13/22  0408 05/12/22  0949 05/12/22  0501   SODIUM mmol/L 129* 130* 133* 135* 136 133*  --  133*   POTASSIUM mmol/L 4.5 4.2 4.6 4.6 4.7 4.4  --  4.2   CHLORIDE mmol/L 94* 95* 97* 99 100 98  --  100   CO2 mmol/L 25.0 26.0 24.0 24.0 25.0 25.0  --  23.0   BUN mg/dL 16 11 16 13 10 16  --  12   CREATININE mg/dL 2.81* 2.38* 3.11* 2.68* 2.09* 2.90*  --  2.48*   GLUCOSE mg/dL 106* 111* 119* 116* 137* 97  --  122*   MAGNESIUM mg/dL 1.9 2.0  --   --  2.0 2.3 2.2 1.9   PHOSPHORUS mg/dL 3.4 2.9 4.0 3.8 3.3 3.2  --  2.0*       CMP   Results from last 7 days   Lab Units 05/18/22  0330 05/17/22  0149 05/16/22  0113 05/15/22  0344 05/14/22  0837 05/13/22  0408 05/12/22  0501   SODIUM mmol/L 129* 130* 133* 135* 136 133* 133*   POTASSIUM mmol/L 4.5 4.2 4.6 4.6 4.7 4.4 4.2   CHLORIDE mmol/L 94* 95* 97* 99 100 98 100   CO2 mmol/L 25.0 26.0 24.0 24.0 25.0 25.0 23.0   BUN mg/dL 16 11 16 13 10 16 12   CREATININE mg/dL 2.81* 2.38* 3.11* 2.68* 2.09* 2.90* 2.48*   GLUCOSE mg/dL 106* 111* 119* 116* 137* 97 122*   ALBUMIN g/dL 2.70* 2.70* 3.00* 3.10* 3.10* 2.90* 3.00*   BILIRUBIN mg/dL  --   --  0.3  --  0.4 0.3 0.3   ALK PHOS U/L  --   --  55  --  57 49 45   AST (SGOT) U/L  --   --  9  --  20 11 12   ALT (SGPT) U/L  --   --  7  --  7 6 7         BNP        TROPONIN  Results from last 7 days   Lab Units 05/13/22  0408   TROPONIN T ng/mL 0.583*       CoAg  Results from last 7 days   Lab Units 05/13/22  0408   INR  1.12*       Creatinine Clearance  Estimated Creatinine Clearance: 29 mL/min (A) (by C-G formula based on SCr of 2.81 mg/dL (H)).    ABG        Radiology  No radiology results for the last day        EKG                      I personally viewed and interpreted the patient's EKG/Telemetry data: Atrial fibrillation    ECHOCARDIOGRAM:    Results for orders placed during the hospital encounter of 05/06/22    Adult Transesophageal Echo (MARSHA) W/ Cont if Necessary Per Protocol    Interpretation Summary  Date of  study  5/13/2022    Indications  Bacteremia  Assessment for bacterial endocarditis    Procedure  Anesthesia was provided by anesthesiologist with intravenous Diprivan.  MARSHA probe could be passed without difficulty.  Patient tolerated the procedure well.  No complications were noted.    Procedure performed  Transesophageal echocardiogram Doppler study (color continuous-wave and pulsed wave)    Results  Technically satisfactory study.  Mitral valve is structurally normal.  Tricuspid valve is normal.  Aortic valve is tricuspid and is normal.  Mild mitral aortic and tricuspid regurgitation is present.  Biatrial enlargement is present.  Left atrial appendage enlargement without clot.  Diffuse left ventricular enlargement and hypocontractility is present with ejection fraction of 25 to 30%.  No pericardial fusion or intracardiac thrombus is present.  Right atrium right ventricle enlargement is present.  Bubble study revealed PFO.  No pericardial effusion or intracardiac thrombus is seen.    Impression  Biatrial and biventricular enlargement.  Diffuse left ventricular hypocontractility with ejection fraction of 25 to 30%.  Mild smoking effect in the left atrium.  Small PFO.  Mild mitral aortic and tricuspid regurgitation is present.          STRESS MYOVIEW:    Cardiolite (Tc-99m Sestamibi) stress test    CARDIAC CATHETERIZATION:            OTHER:         Assessment & Plan     Principal Problem:    Chronic systolic (congestive) heart failure (HCC)  Active Problems:    S/P CABG (coronary artery bypass graft)    Primary hypertension    Elevated troponin    ANNETTE treated with BiPAP    Major depressive disorder, recurrent, mild (HCC)    Mixed hyperlipidemia    Flaccid hemiplegia affecting right dominant side (HCC)    Diabetic neuropathy (HCC)    Chronic obstructive pulmonary disease with (acute) exacerbation (HCC)    Anemia of renal disease    End stage renal disease (HCC)    Chronic respiratory failure with hypoxia, on home  oxygen therapy (HCC)    Other specified hypothyroidism    Fluid overload    Stage 5 chronic kidney disease on chronic dialysis (HCC)    Bacteremia    Normocytic anemia due to blood loss    Essential (primary) hypertension    Cardiopulmonary arrest with successful resuscitation ([facility])    Ventricular fibrillation ([facility])    Arteriovenous fistula stenosis (HCC)      [[[[[[[[[[[[[[[[[[[[[[[[[    Impression  ==============  -Shortness of breath     - Positive blood cultures for Streptococcus 2 out of 2 sets.  Rule out tunneled catheter infection.     -Acute on chronic fluid overload.      -status post CABG 2005. status post stent to LAD 2009    Status post stent to LAD 11/13/2015  Status post stent to mid LAD and SVG to marginal branch 09/16/2016.  Status post stent to mid LAD 5/24/2021  Status post stent to SVG to RCA 5/26/2021  Status post stent to proximal LAD 5/13/2022     Cardiac catheterization 5/13/2022 revealed  Left ventricle is significantly enlarged with severe and diffuse hypocontractility with ejection fraction of 20%.  Left main coronary artery is normal.  Left anterior descending artery has proximal 90% disease.  Mid segment stents are patent.  Circumflex coronary artery is totally occluded.  Right coronary artery is totally occluded.  SVG to marginal branch is totally and chronically occluded.  SVG to RCA is patent.  Left subclavian artery is normal.  Left internal mammary artery is not a graft and is normal.    Cardiac catheterization 5/24/2021 revealed  Left ventricle angiogram was not performed due to pre-existing renal dysfunction.  Left main coronary artery normal.  Left anterior descending artery has mid segment lengthy 90% disease within the previously placed stent.  Distal left into descending artery has diffuse disease.  Circumflex coronary artery is totally occluded.  (Chronic)  Right coronary artery is chronically occluded.  SVG to marginal branch (jump graft to OM1 and OM 2) is totally occluded  (new finding compared to 2015.  SVG to PDA has distal radiolucency.  However no definite obstructive disease is present.       -status post myocardial infarction 2000 and 2002 .      -history of intermittent but mostly persistent atrial fibrillation     -Acute on chronic congestive heart failure.  Compensated at this time.     Recent echocardiogram showed left ventricle dysfunction with ejection fraction of 35%.     -History of right-sided weakness with left MCA territory stroke.-Improved    MARSHA 5/13/2022 revealed  Biatrial and biventricular enlargement.  Diffuse left ventricular hypocontractility with ejection fraction of 25 to 30%.  Mild smoking effect in the left atrium.  Small PFO.  Mild mitral aortic and tricuspid regurgitation is present.     Transesophageal echocardiogram 11/30/2020.  Biatrial enlargement.  Smoking effect in the left atrium and left ventricle and left atrial appendage.  Mild mitral and aortic regurgitation.  Left ventricle enlargement with diffuse hypocontractility with ejection fraction of 35 to 40%.     Echocardiogram 11/27/2020 revealed left atrial enlargement left ventricle dysfunction with ejection fraction of 40%.  Negative bubble study.      -diabetes hypertension and sleep apnea.  ESRD.     -status post colon surgery (partial colectomy) appendectomy cholecystectomy right 4th toe removal and carpal tunnel surgery      -continued smoking 1 pack per day -abstinence from smoking      -allergy to codeine.  ============   Plan  ================  Shortness of breath  Positive blood cultures for Streptococcus 2 out of 2 sets.  Rule out tunneled catheter infection..  MARSHA 5/13/2022-negative for vegetations.     Status post CABG  Recent CODE BLUE and ventricular fibrillation.  Patient had cardiac catheterization and stent placement to proximal LAD 5/13/2022.  Patient is not having any angina pectoris or congestive heart failure     Atrial fibrillation-chronic  Rate is better controlled  Patient  is on Coreg at 12.5 mg p.o. twice a day amiodarone and Cardizem.      ESRD  Patient is undergoing dialysis.  Patient had dialysis yesterday    Hypokalemia  k 3.2-supplements.  - Improved at 4.0       Anticoagulation  Patient was on Eliquis 5 mg twice a day.  Observe for toxic effects.  Eliquis was on hold.  Have discussed with attending physician for coordination of care.  Eliquis restarted.    Ischemic cardiomyopathy.  Recent ventricular fibrillation probably due to severe proximal left anterior descending artery disease.  Likely left ventricular dysfunction we will continue despite having recent stent placement.  Likely patient would benefit from ICD placement (single-chamber).  Patient has narrow QRS complex.  However would like to wait until the infection is cleared up.  Patient may benefit from LifeVest.  I am not sure however patient would comply with wearing LifeVest.  We discussed with infectious disease regarding timing of placement of ICD.  LifeVest was requested.  However patient apparently could not follow instructions from LifeVest instructors and they did not think he will be a suitable for LifeVest.    Current medications include amiodarone 200 mg a day aspirin atorvastatin Plavix levothyroxine metoprolol 25 mg twice a day midodrine 15 mg 3 times daily pantoprazole.    Have discussed with attending nurse for coordination of care.     Follow-up labs ordered.     Further plan will depend on patient's progress.   ]]]]]]]]]]]]]]]]]]]]]]       Jose G Rm MD  05/18/22  06:44 EDT

## 2022-05-18 NOTE — CASE MANAGEMENT/SOCIAL WORK
Continued Stay Note  DOREEN Brennan     Patient Name: Benson Mclean  MRN: [id number]  Today's Date: 5/18/2022    Admit Date: 5/6/2022     Discharge Plan     Row Name 05/18/22 1337       Plan    Plan Return to [facility], will return skilled initially for IV abx. No PASRR required. Precert resubmitted 5/18, does not have to delay d/c. Current OP HD at [facility]. Patient did not meet criteria for Lifevest.    Plan Comments Confirmed with [facility] liaison at [facility] that bed is ready for patient to d/c today. Received notice from RN that per [employer] Lifevest since patient cannot safely manage his own Lifevest he does not meet criteria for Lifevest and cardiology has been made aware. Case discussed with MD and RN during rounds and patient will d/c today. Attempted to call patients wife, no answer (she is currently admitted to the hospital). Attempted to call brother Davis, no answer, left .  Update 6325-- Received a return phone call from brother Davis, updated on d/c plan, he remains agreeable.               Phone communication or documentation only - no physical contact with patient or family.        Caron Rodriguez RN

## 2022-05-18 NOTE — PROGRESS NOTES
Daily Progress Note        Chronic systolic (congestive) heart failure ([facility])    S/P CABG (coronary artery bypass graft)    Primary hypertension    Elevated troponin    ANNETTE treated with BiPAP    Major depressive disorder, recurrent, mild ([facility])    Mixed hyperlipidemia    Flaccid hemiplegia affecting right dominant side ([facility])    Diabetic neuropathy ([facility])    Chronic obstructive pulmonary disease with (acute) exacerbation ([facility])    Anemia of renal disease    End stage renal disease ([facility])    Chronic respiratory failure with hypoxia, on home oxygen therapy ([facility])    Other specified hypothyroidism    Fluid overload    Stage 5 chronic kidney disease on chronic dialysis ([facility])    Bacteremia    Normocytic anemia due to blood loss    Essential (primary) hypertension    Cardiopulmonary arrest with successful resuscitation ([facility])    Ventricular fibrillation ([facility])    Arteriovenous fistula stenosis ([facility])      Assessment    S/p Code blue was in V-fib. was defibrillated x1.  Patient was given an epi x1, bicarb x2. Upon pulse checks patient was found to be with a pulse and in normal sinus with a left bundle. Patient was moaning and turning his head. He became more and more alert.  Patient did not require intubation.       He continued to oxygenate well with nasal cannula. Within about 5 minutes the patient was asking to sit up and asking for water     Bacteremia with Streptococcus gallolyticus previously called Streptococcus bovis could be associated with GI malignancy  5/7/2020 repeat blood cultures negative    5/13/2022 MARSHA to rule out endocarditis due to Streptococcus Gallolyticus  bacteria  Biatrial and biventricular enlargement.  Diffuse left ventricular hypocontractility with ejection fraction of 25 to 30%.  Mild smoking effect in the left atrium.  Small PFO.  Mild mitral aortic and tricuspid regurgitation is present.  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,   anemia of renal disease,   vitamin deficiencies,   dementia         Recommendations:     Antibiotics currently on Rocephin     Hemodialysis     BiPAP 12/5 to be used at night and as needed during the day    BP control, midodrine  Amiodarone PO  GI prophylaxis Protonix  Synthroid  Anticoagulation Eliquis    Cardiology planning ICD placement, arranging with ID regarding timing of placement             LOS: 12 days     Subjective         Objective     Vital signs for last 24 hours:  Vitals:    05/17/22 2046 05/17/22 2148 05/18/22 0232 05/18/22 0525   BP: 112/51 113/54 109/54 101/52   BP Location:  Right arm Right arm Right arm   Patient Position:  Lying Sitting Lying   Pulse: 64 65 64 62   Resp:  16 12 12   Temp:  98.1 °F (36.7 °C) 98.5 °F (36.9 °C) 97.8 °F (36.6 °C)   TempSrc:  Oral Axillary Oral   SpO2:  99% 100% 100%   Weight:       Height:           Intake/Output last 3 shifts:  I/O last 3 completed shifts:  In: 770 [P.O.:720; IV Piggyback:50]  Out: 200 [Urine:100; Other:100]  Intake/Output this shift:  I/O this shift:  In: 290 [P.O.:240; IV Piggyback:50]  Out: -       Radiology  Imaging Results (Last 24 Hours)     ** No results found for the last 24 hours. **          Labs:  Results from last 7 days   Lab Units 05/18/22  0330   WBC 10*3/mm3 5.20   HEMOGLOBIN g/dL 10.3*   HEMATOCRIT % 33.2*   PLATELETS 10*3/mm3 182     Results from last 7 days   Lab Units 05/18/22  0330 05/17/22  0149 05/16/22  0113   SODIUM mmol/L 129*   < > 133*   POTASSIUM mmol/L 4.5   < > 4.6   CHLORIDE mmol/L 94*   < > 97*   CO2 mmol/L 25.0   < > 24.0   BUN mg/dL 16   < > 16   CREATININE mg/dL 2.81*   < > 3.11*   CALCIUM mg/dL 7.9*   < > 8.2*   BILIRUBIN mg/dL  --   --  0.3   ALK PHOS U/L  --   --  55    ALT (SGPT) U/L  --   --  7   AST (SGOT) U/L  --   --  9   GLUCOSE mg/dL 106*   < > 119*    < > = values in this interval not displayed.         Results from last 7 days   Lab Units 05/18/22  0330 05/17/22  0149 05/16/22  0113   ALBUMIN g/dL 2.70* 2.70* 3.00*     Results from last 7 days   Lab Units 05/13/22  0408   TROPONIN T ng/mL 0.583*         Results from last 7 days   Lab Units 05/18/22  0330   MAGNESIUM mg/dL 1.9     Results from last 7 days   Lab Units 05/13/22  0408   INR  1.12*               Meds:   SCHEDULE  amiodarone, 200 mg, Oral, Q24H  apixaban, 5 mg, Oral, Q12H  aspirin, 81 mg, Oral, Daily  atorvastatin, 40 mg, Oral, Nightly  budesonide, 0.5 mg, Nebulization, BID  cefTRIAXone, 2 g, Intravenous, Q24H  clopidogrel, 75 mg, Oral, Daily  donepezil, 10 mg, Oral, Nightly  insulin lispro, 0-7 Units, Subcutaneous, TID AC  ipratropium-albuterol, 3 mL, Nebulization, TID - RT  levothyroxine, 50 mcg, Oral, Q AM  lidocaine, 20 mL, Intradermal, Once  metoprolol tartrate, 25 mg, Oral, BID  midodrine, 15 mg, Oral, TID AC  pantoprazole, 40 mg, Oral, QAM  polyethylene glycol, 17 g, Oral, BID  sertraline, 50 mg, Oral, Daily  sodium chloride, 10 mL, Intravenous, Q12H  sodium chloride, 10 mL, Intravenous, Q12H  sorbitol, 50 mL, Oral, Daily      Infusions     PRNs  •  acetaminophen **OR** acetaminophen **OR** acetaminophen  •  acetaminophen  •  aluminum-magnesium hydroxide-simethicone  •  atropine  •  dextrose  •  dextrose  •  glucagon (human recombinant)  •  heparin (porcine)  •  insulin lispro **AND** insulin lispro  •  ipratropium-albuterol  •  melatonin  •  ondansetron **OR** ondansetron  •  sodium chloride  •  sodium chloride  •  sodium chloride  •  sodium chloride    Physical Exam:  Physical Exam  Vitals reviewed.   Pulmonary:      Breath sounds: Decreased breath sounds present.   Musculoskeletal:         General: Swelling present.      Right lower leg: Edema present.      Left lower leg: Edema present.   Skin:      General: Skin is warm and dry.   Neurological:      Mental Status: He is alert.         ROS  Review of Systems   Respiratory: Positive for cough and shortness of breath.    Neurological: Positive for weakness.       I reviewed the patient's new clinical results    Electronically signed by FABIOLA Bui

## 2022-05-18 NOTE — PROGRESS NOTES
"    [facility] Medicine Services Daily Progress Note    Patient Name: Benson Mclean  : 1950  MRN: [id number]  Primary Care Physician:  Rudolph Rooney MD  Date of admission: 2022      Subjective        Chief Complaint: Fluid overload, incomplete dialysis, bacteremia        Patient Reports he is doing okay.  Patient repeats \"yeah\" over and over again.  Patient moving his head in all direction.  Seems altered today.  Pending dialysis later on.     ROS negative except as above    Objective      Vitals:   Temp:  [97.5 °F (36.4 °C)-98.5 °F (36.9 °C)] 97.5 °F (36.4 °C)  Heart Rate:  [62-71] 69  Resp:  [12-20] 18  BP: (101-120)/(40-54) 120/47  Flow (L/min):  [2] 2       Physical Exam  Vitals reviewed.   Constitutional:       Comments: Frail chronically ill-appearing   HENT:      Head: Normocephalic.   Cardiovascular:      Rate and Rhythm: Normal rate.   Pulmonary:      Effort: Pulmonary effort is normal.      Breath sounds: Rhonchi present.   Abdominal:      General: Abdomen is flat.      Palpations: Abdomen is soft.   Musculoskeletal:         General: Normal range of motion.      Cervical back: Normal range of motion.   Skin:     General: Skin is warm.   Neurological:      General: No focal deficit present.      Mental Status: He is alert and oriented to person, place, and time.  Patient is very repetitive today  Psychiatric:         Mood and Affect: Mood normal.         Behavior: Behavior altered today.        Result Review    Result Review:  I have personally reviewed the results from the time of this admission to 2022 17:30 EDT and agree with these findings:  [x]  Laboratory  []  Microbiology  []  Radiology  []  EKG/Telemetry   []  Cardiology/Vascular   []  Pathology  []  Old records  []  Other:  Most notable findings include: Renal function abnormal due to dialysis status    Wounds (last 24 hours)     LDA Wound     Row Name 22 1200 22 1120 22 1117       Wound " 05/06/22 1706 Right heel Pressure Injury    Wound - Properties Group Placement Date: 05/06/22  -MAGNO Placement Time: 1706 -MAGNO Present on Hospital Admission: Y  -MAGNO Side: Right  -MAGNO Location: heel  -MAGNO Primary Wound Type: Pressure inj  -BREANNE    Wound Image View All Images -- -- View Images  -BREANNE    Pressure Injury Stage 2  -BREANNE -- --    Dressing Appearance dry;intact  -BREANNE -- --    Closure Adhesive bandage  -BREANNE -- --    Base non-blanchable;red  open  -BREANNE -- --    Retired Wound - Properties Group Placement Date: 05/06/22  -MAGNO Placement Time: 1706 -MAGNO Present on Hospital Admission: Y  -MAGNO Side: Right  -MAGNO Location: heel  -MAGNO Primary Wound Type: Pressure inj  -BREANNE    Retired Wound - Properties Group Date first assessed: 05/06/22  -MAGNO Time first assessed: 1706 -MAGNO Present on Hospital Admission: Y  -MAGNO Side: Right  -MAGNO Location: heel  -MAGNO Primary Wound Type: Pressure inj  -BREANNE       Wound 05/06/22 1710 Right anterior greater trochanter    Wound - Properties Group Placement Date: 05/06/22  -MAGNO Placement Time: 1710  -MAGNO Present on Hospital Admission: Y  -MAGNO Side: Right  -MAGNO Orientation: anterior  -MAGNO Location: greater trochanter  -MAGNO    Wound Image View All Images -- View Images  -BREANNE --    Pressure Injury Stage 2  -BREANNE -- --    Dressing Appearance open to air  -BREANNE -- --    Closure Open to air  -BREANNE -- --    Base pink;scab;dry  -BREANNE -- --    Drainage Amount none  -BREANNE -- --    Retired Wound - Properties Group Placement Date: 05/06/22  -MAGNO Placement Time: 1710 -MAGNO Present on Hospital Admission: Y  -MAGNO Side: Right  -MAGNO Orientation: anterior  -MAGNO Location: greater trochanter  -MAGNO    Retired Wound - Properties Group Date first assessed: 05/06/22  -MAGNO Time first assessed: 1710  -MAGNO Present on Hospital Admission: Y  -MAGNO Side: Right  -MAGNO Location: greater trochanter  -MAGNO       Wound 03/30/22 0341 Left posterior thigh    Wound - Properties Group Placement Date: 03/30/22  -AS Placement Time: 0341  -AS Present on Hospital Admission: Y  -AS  Side: Left  -AS Orientation: posterior  -AS Location: thigh  -AS    Pressure Injury Stage 2  -BREANNE -- --    Closure Adhesive bandage  -BREANNE -- --    Base dressing in place, unable to visualize  -BREANNE -- --    Drainage Amount none  -BREANNE -- --    Retired Wound - Properties Group Placement Date: 03/30/22  -AS Placement Time: 0341  -AS Present on Hospital Admission: Y  -AS Side: Left  -AS Orientation: posterior  -AS Location: thigh  -AS    Retired Wound - Properties Group Date first assessed: 03/30/22  -AS Time first assessed: 0341  -AS Present on Hospital Admission: Y  -AS Side: Left  -AS Location: thigh  -AS       Wound 03/30/22 0340 perineum Pressure Injury    Wound - Properties Group Placement Date: 03/30/22  -AS Placement Time: 0340  -AS Present on Hospital Admission: Y  -AS Location: perineum  -AS Primary Wound Type: Pressure inj  -AS    Pressure Injury Stage 2  -BREANNE -- --    Dressing Appearance open to air  -BREANNE -- --    Closure Open to air  -BREANNE -- --    Base dry;clean;pink;scab  -BREANNE -- --    Periwound excoriated  -BREANNE -- --    Drainage Amount none  -BREANNE -- --    Retired Wound - Properties Group Placement Date: 03/30/22  -AS Placement Time: 0340  -AS Present on Hospital Admission: Y  -AS Location: perineum  -AS Primary Wound Type: Pressure inj  -AS    Retired Wound - Properties Group Date first assessed: 03/30/22  -AS Time first assessed: 0340  -AS Present on Hospital Admission: Y  -AS Location: perineum  -AS Primary Wound Type: Pressure inj  -AS       Wound 05/11/22 0230 Right posterior hand Skin Tear    Wound - Properties Group Placement Date: 05/11/22  - Placement Time: 0230  - Side: Right  - Orientation: posterior  - Location: hand  - Primary Wound Type: Skin tear  -    Dressing Appearance dry;intact  -BREANNE -- --    Closure Adhesive bandage  -BREANNE -- --    Base dressing in place, unable to visualize  -BREANNE -- --    Retired Wound - Properties Group Placement Date: 05/11/22  - Placement Time: 0230  - Side: Right   - Orientation: posterior  - Location: hand  - Primary Wound Type: Skin tear  -    Retired Wound - Properties Group Date first assessed: 05/11/22  - Time first assessed: 0230  - Side: Right  - Location: hand  - Primary Wound Type: Skin tear  -    Row Name 05/18/22 1116 05/18/22 1114 05/18/22 0800       Wound 05/06/22 1706 Right heel Pressure Injury    Wound - Properties Group Placement Date: 05/06/22  -MAGNO Placement Time: 1706 -MAGNO Present on Hospital Admission: Y  -MAGNO Side: Right  -MAGNO Location: heel  -MGANO Primary Wound Type: Pressure inj  -BREANNE    Pressure Injury Stage -- -- 2  -BREANNE    Dressing Appearance -- -- dry;intact  dressing changed  -BREANNE    Closure -- -- Adhesive bandage  -BREANNE    Base -- -- non-blanchable;red  open  -BREANNE    Drainage Characteristics/Odor -- -- serosanguineous  -BREANNE    Drainage Amount -- -- scant  -BREANNE    Care, Wound -- -- cleansed with;sterile normal saline  -BREANNE    Dressing Care -- -- dressing changed;silicone  -BREANNE    Retired Wound - Properties Group Placement Date: 05/06/22  -MAGNO Placement Time: 1706  -MAGNO Present on Hospital Admission: Y  -MAGNO Side: Right  -MAGNO Location: heel  -MAGNO Primary Wound Type: Pressure inj  -BREANNE    Retired Wound - Properties Group Date first assessed: 05/06/22  -MAGNO Time first assessed: 1706 -MAGNO Present on Hospital Admission: Y  -MAGNO Side: Right  -MAGNO Location: heel  -MAGNO Primary Wound Type: Pressure inj  -BREANNE       [REMOVED] Wound 03/30/22 0343 Left heel    Wound - Properties Group Placement Date: 03/30/22  -AS Placement Time: 0343  -AS Present on Hospital Admission: Y  -AS Side: Left  -AS Location: heel  -AS Removal Date: 05/18/22  -BREANNE Removal Time: 0800  -BREANNE Wound Outcome: Healed  -BREANNE    Wound Image View All Images View Images  -BREANNE -- --    Pressure Injury Stage -- -- --  healed  -BREANNE    Closure -- -- --  -BREANNE    Base -- -- blanchable;red  -BREANNE    Drainage Amount -- -- --  -BREANNE    Dressing Care -- -- silicone  -BREANNE    Retired Wound - Properties Group Placement Date:  03/30/22  -AS Placement Time: 0343  -AS Present on Hospital Admission: Y  -AS Side: Left  -AS Location: heel  -AS Removal Date: 05/18/22  -BREANNE Removal Time: 0800  -BREANNE Wound Outcome: Healed  -BREANNE    Retired Wound - Properties Group Date first assessed: 03/30/22  -AS Time first assessed: 0343  -AS Present on Hospital Admission: Y  -AS Side: Left  -AS Location: heel  -AS Resolution Date: 05/18/22  -BREANNE Resolution Time: 0800  -BREANNE Wound Outcome: Healed  -BREANNE       Wound 05/06/22 1710 Right anterior greater trochanter    Wound - Properties Group Placement Date: 05/06/22  -MAGNO Placement Time: 1710  -MAGNO Present on Hospital Admission: Y  -MAGNO Side: Right  -MAGNO Orientation: anterior  -MAGNO Location: greater trochanter  -MAGNO    Pressure Injury Stage -- -- 2  -BREANNE    Dressing Appearance -- -- open to air  -BREANNE    Closure -- -- Open to air  -BREANNE    Base -- -- pink;scab;dry  -BREANNE    Drainage Amount -- -- none  -BREANNE    Retired Wound - Properties Group Placement Date: 05/06/22  -MAGNO Placement Time: 1710  -MAGNO Present on Hospital Admission: Y  -MAGNO Side: Right  -MAGNO Orientation: anterior  -MAGNO Location: greater trochanter  -MAGNO    Retired Wound - Properties Group Date first assessed: 05/06/22  -MAGNO Time first assessed: 1710  -MAGNO Present on Hospital Admission: Y  -MAGNO Side: Right  -MAGNO Location: greater trochanter  -MAGNO       Wound 03/30/22 0341 Left posterior thigh    Wound - Properties Group Placement Date: 03/30/22  -AS Placement Time: 0341  -AS Present on Hospital Admission: Y  -AS Side: Left  -AS Orientation: posterior  -AS Location: thigh  -AS    Wound Image View All Images -- View Images  -BREANNE --    Pressure Injury Stage -- -- 2  -BREANNE    Dressing Appearance -- -- dry;intact  -BREANNE    Closure -- -- Adhesive bandage  -BREANNE    Base -- -- pink;non-blanchable;red  open  -BREANNE    Drainage Amount -- -- none  -BREANNE    Care, Wound -- -- cleansed with;soap and water  -BREANNE    Dressing Care -- -- dressing changed;silicone  -BREANNE    Retired Wound - Properties Group Placement  Date: 03/30/22  -AS Placement Time: 0341  -AS Present on Hospital Admission: Y  -AS Side: Left  -AS Orientation: posterior  -AS Location: thigh  -AS    Retired Wound - Properties Group Date first assessed: 03/30/22  -AS Time first assessed: 0341  -AS Present on Hospital Admission: Y  -AS Side: Left  -AS Location: thigh  -AS       Wound 03/30/22 0340 perineum Pressure Injury    Wound - Properties Group Placement Date: 03/30/22  -AS Placement Time: 0340 -AS Present on Hospital Admission: Y  -AS Location: perineum  -AS Primary Wound Type: Pressure inj  -AS    Pressure Injury Stage -- -- 2  -BREANNE    Dressing Appearance -- -- open to air  -BREANNE    Closure -- -- Open to air  -BREANNE    Base -- -- dry;clean;pink;scab  -BREANNE    Periwound -- -- excoriated  -BREANNE    Drainage Amount -- -- none  -BREANNE    Care, Wound -- -- cleansed with;soap and water  -BREANNE    Retired Wound - Properties Group Placement Date: 03/30/22  -AS Placement Time: 0340  -AS Present on Hospital Admission: Y  -AS Location: perineum  -AS Primary Wound Type: Pressure inj  -AS    Retired Wound - Properties Group Date first assessed: 03/30/22  -AS Time first assessed: 0340  -AS Present on Hospital Admission: Y  -AS Location: perineum  -AS Primary Wound Type: Pressure inj  -AS       Wound 05/11/22 0230 Right posterior hand Skin Tear    Wound - Properties Group Placement Date: 05/11/22  - Placement Time: 0230  - Side: Right  - Orientation: posterior  - Location: hand  - Primary Wound Type: Skin tear  -    Dressing Appearance -- -- dry;intact  -BREANNE    Closure -- -- Adhesive bandage  -BREANNE    Base -- -- other (see comments)  skin tear  -BREANNE    Periwound Temperature -- -- --  -BREANNE    Periwound Skin Turgor -- -- --  -BREANNE    Dressing Care -- -- dressing changed;silicone  -BREANNE    Retired Wound - Properties Group Placement Date: 05/11/22  - Placement Time: 0230  - Side: Right  - Orientation: posterior  - Location: hand  - Primary Wound Type: Skin tear  -    Retired  Wound - Properties Group Date first assessed: 05/11/22  - Time first assessed: 0230  - Side: Right  - Location: hand  - Primary Wound Type: Skin tear  -    Row Name 05/17/22 2000             Wound 05/06/22 1706 Right heel Pressure Injury    Wound - Properties Group Placement Date: 05/06/22  -MAGNO Placement Time: 1706 -JP Present on Hospital Admission: Y  -MAGNO Side: Right  -MAGNO Location: heel  -MAGNO Primary Wound Type: Pressure inj  -BREANNE      Dressing Appearance intact;dry  -RD      Closure Adhesive bandage;OZIEL  -RD      Base dressing in place, unable to visualize  -RD      Care, Wound pressure removed  -RD      Dressing Care gauze;foam;silicone  -RD      Retired Wound - Properties Group Placement Date: 05/06/22  -MAGNO Placement Time: 1706 -JP Present on Hospital Admission: Y  -MAGNO Side: Right  -MAGNO Location: heel  -MAGNO Primary Wound Type: Pressure inj  -BREANNE      Retired Wound - Properties Group Date first assessed: 05/06/22  -MAGNO Time first assessed: 1706 -MAGNO Present on Hospital Admission: Y  -MAGNO Side: Right  -MAGNO Location: heel  -MAGNO Primary Wound Type: Pressure inj  -BREANNE              [REMOVED] Wound 03/30/22 0343 Left heel    Wound - Properties Group Placement Date: 03/30/22  -AS Placement Time: 0343  -AS Present on Hospital Admission: Y  -AS Side: Left  -AS Location: heel  -AS Removal Date: 05/18/22  -BREANNE Removal Time: 0800  -BREANNE Wound Outcome: Healed  -BREANNE      Dressing Appearance dry;intact  -RD      Closure Adhesive bandage  -RD      Base dressing in place, unable to visualize  -RD      Drainage Amount none  -RD      Care, Wound pressure removed  -RD      Dressing Care foam;gauze;silicone  -RD      Retired Wound - Properties Group Placement Date: 03/30/22  -AS Placement Time: 0343  -AS Present on Hospital Admission: Y  -AS Side: Left  -AS Location: heel  -AS Removal Date: 05/18/22  -BREANNE Removal Time: 0800  -BREANNE Wound Outcome: Healed  -BREANNE      Retired Wound - Properties Group Date first assessed: 03/30/22  -AS Time  first assessed: 0343  -AS Present on Hospital Admission: Y  -AS Side: Left  -AS Location: heel  -AS Resolution Date: 05/18/22  -BREANNE Resolution Time: 0800  -BREANNE Wound Outcome: Healed  -BREANNE              Wound 05/06/22 1710 Right anterior greater trochanter    Wound - Properties Group Placement Date: 05/06/22  -MAGNO Placement Time: 1710  -MAGNO Present on Hospital Admission: Y  -MAGNO Side: Right  -MAGNO Orientation: anterior  -MAGNO Location: greater trochanter  -MAGNO      Dressing Appearance open to air  -RD      Closure Open to air  -RD      Base pink;scab;dry  -RD      Drainage Amount none  -RD      Care, Wound pressure removed  -RD      Dressing Care skin barrier agent applied  -RD      Periwound Care dry periwound area maintained  -RD      Retired Wound - Properties Group Placement Date: 05/06/22  -MAGNO Placement Time: 1710  -MAGNO Present on Hospital Admission: Y  -MAGNO Side: Right  -MAGNO Orientation: anterior  -MAGNO Location: greater trochanter  -MAGNO      Retired Wound - Properties Group Date first assessed: 05/06/22  -MAGNO Time first assessed: 1710  -MAGNO Present on Hospital Admission: Y  -MAGNO Side: Right  -MAGNO Location: greater trochanter  -MAGNO              Wound 03/30/22 0341 Left posterior thigh    Wound - Properties Group Placement Date: 03/30/22  -AS Placement Time: 0341  -AS Present on Hospital Admission: Y  -AS Side: Left  -AS Orientation: posterior  -AS Location: thigh  -AS      Dressing Appearance open to air  -RD      Closure Open to air  -RD      Base dry;pink  -RD      Drainage Amount none  -RD      Care, Wound pressure removed;barrier applied  -RD      Dressing Care skin barrier agent applied  -RD      Periwound Care barrier ointment applied  -RD      Retired Wound - Properties Group Placement Date: 03/30/22  -AS Placement Time: 0341  -AS Present on Hospital Admission: Y  -AS Side: Left  -AS Orientation: posterior  -AS Location: thigh  -AS      Retired Wound - Properties Group Date first assessed: 03/30/22  -AS Time first assessed:  0341  -AS Present on Hospital Admission: Y  -AS Side: Left  -AS Location: thigh  -AS              Wound 03/30/22 0340 perineum Pressure Injury    Wound - Properties Group Placement Date: 03/30/22  -AS Placement Time: 0340  -AS Present on Hospital Admission: Y  -AS Location: perineum  -AS Primary Wound Type: Pressure inj  -AS      Dressing Appearance open to air  -RD      Closure Open to air  -RD      Base dry;clean;pink  -RD      Periwound excoriated  -RD      Drainage Amount none  -RD      Care, Wound pressure removed  -RD      Dressing Care open to air;skin barrier agent applied  -RD      Periwound Care dry periwound area maintained;barrier ointment applied  -RD      Retired Wound - Properties Group Placement Date: 03/30/22  -AS Placement Time: 0340  -AS Present on Hospital Admission: Y  -AS Location: perineum  -AS Primary Wound Type: Pressure inj  -AS      Retired Wound - Properties Group Date first assessed: 03/30/22  -AS Time first assessed: 0340  -AS Present on Hospital Admission: Y  -AS Location: perineum  -AS Primary Wound Type: Pressure inj  -AS              Wound 05/11/22 0230 Right posterior hand Skin Tear    Wound - Properties Group Placement Date: 05/11/22  - Placement Time: 0230  - Side: Right  - Orientation: posterior  - Location: hand  - Primary Wound Type: Skin tear  -      Dressing Appearance dry;intact  -RD      Closure OZIEL;Adhesive bandage  -RD      Base dressing in place, unable to visualize  -RD      Periwound Temperature warm  -RD      Periwound Skin Turgor firm  -RD      Dressing Care foam;gauze;silicone  -RD      Retired Wound - Properties Group Placement Date: 05/11/22  - Placement Time: 0230  - Side: Right  - Orientation: posterior  - Location: hand  - Primary Wound Type: Skin tear  -      Retired Wound - Properties Group Date first assessed: 05/11/22  - Time first assessed: 0230  - Side: Right  - Location: hand  - Primary Wound Type: Skin tear  -             User Key  (r) = Recorded By, (t) = Taken By, (c) = Cosigned By    Initials Name Provider Type    Jessica Isaac, RN Registered Nurse    Zainab Griffiths RN Registered Nurse    Zainab Manuel RN Registered Nurse    AS Maricruz Mi, RN Registered Nurse    Dontrell Ferguson RN Registered Nurse                Assessment/Plan    71-year-old gentleman with fluid overload and bacteremia     Active Hospital Problems:          Active Hospital Problems     Diagnosis     • **Fluid overload     • Chronic respiratory failure with hypoxia, on home oxygen therapy (HCC)     • Elevated troponin     • Hypothyroidism (acquired)     • End stage renal disease (HCC)     • Anemia of renal disease     • Mixed hyperlipidemia     • Major depressive disorder, recurrent, mild ([facility])     • Flaccid hemiplegia of right dominant side as late effect of cerebral infarction ([facility])     • COPD with acute exacerbation ([facility])     • S/P CABG (coronary artery bypass graft)     • Essential hypertension     • ANNETTE treated with BiPAP        Plan:      Shortness of breath secondary to fluid overload  Chronic respiratory failure with oxygen dependence    -Improving.  -CXR: small right greater than left pleural effusions.  Bibasilar airspace disease, right greater than left, favored to represent atelectasis.   -proBNP >70,000 (previous proBNP march 2022 was also >70,000)       -will recheck in the a.m.  -pt on his home rate of 3L O2 via [place]  -pt did not complete dialysis prior to admission  -nephrology consulted appreciate assistance     Strep bovis bacteremia.  ID is following.  IV antibiotics ordered  Pending culture of dialysis catheter  Pending final identification  Echo   Appreciate ID assistance  Rocephin until May 20 IV.  Can finish at the facility     Chronically elevated troponin  -troponin 0.33 compared to troponin 0.146 on 3/12/2022.   -Will trend      ESRD on HD MWF  -on midodrine for BP support  -nephrology to manage     CVA with right  sided residual weakness  -on eliquis, statin       -restart Elliquis in the am.     CAD s/p CABG, PCI  Chronic atrial fibrillation  Hyperlipidemia  -chronic, controlled.   -continue Eliquis, atorvastatin, and metoprolol  -For MARSHA cardiac cath for ablation treatment      DM type 2  -hemoglobin A1c 6.3 on 3/12/2022 and previous admission basal insulin stopped due to hypoglycemia  -SSI AC/HS     Hypothyroidism  -Continue home synthroid     Major depressive disorder, recurrent, mild   Dementia  -Continue home zoloft, aricept     ANNETTE  -cpap qhs      Obesity (BMI 30-39.9)  BMI 36.92  -Lifestyle modifications to reduce cardiovascular risk factors     Dysphagia  Patient complaining about his current diet.  Speech following.     Possible AICD placement deferred to a later time.    Patient was evaluated for LifeVest but did not qualify as he is not able to follow directions if the vest prompts him.    Patient altered and delirious on the 18th unable to return to facility yet.  Scheduled for dialysis late in the afternoon     DVT prophylaxis: eliquis      CODE STATUS:    Admission Status:  I believe this patient meets inpatient status.     I discussed the patient's findings and my recommendations with patient.     This patient has been examined wearing appropriate Personal Protective Equipment.   05/18/22      Electronically signed by Sunny Shannon MD, 05/18/22, 17:30 EDT.  [facility] Hospitalist Team

## 2022-05-18 NOTE — PROGRESS NOTES
Infectious Diseases Progress Note      LOS: 12 days   Patient Care Team:  Rudolph Rooney MD as PCP - General (Family Medicine)  Samantha Zabala APRN as PCP - Family Medicine  Jose G Rm MD as Consulting Physician (Cardiology)    Chief Complaint: Shortness of breath    Subjective       The patient has been afebrile for the last 24 hours.  The patient is hemodynamically stable requiring no vasopressors.  He is currently on 2 L of oxygen via nasal cannula      Review of Systems:   Review of Systems   Constitutional: Positive for fatigue.   HENT: Negative.    Eyes: Negative.    Respiratory: Positive for shortness of breath.    Cardiovascular: Negative.    Gastrointestinal: Negative.    Endocrine: Negative.    Genitourinary: Negative.    Musculoskeletal: Negative.    Skin: Negative.    Neurological: Positive for weakness.   Psychiatric/Behavioral: Negative.    All other systems reviewed and are negative.       Objective     Vital Signs  Temp:  [97.5 °F (36.4 °C)-98.5 °F (36.9 °C)] 97.5 °F (36.4 °C)  Heart Rate:  [62-71] 69  Resp:  [12-20] 18  BP: (101-120)/(40-54) 120/47    Physical Exam:  Physical Exam  Vitals and nursing note reviewed.   Constitutional:       General: He is not in acute distress.     Appearance: Normal appearance. He is well-developed and normal weight. He is ill-appearing. He is not diaphoretic.   HENT:      Head: Normocephalic and atraumatic.   Eyes:      Conjunctiva/sclera: Conjunctivae normal.      Pupils: Pupils are equal, round, and reactive to light.   Cardiovascular:      Rate and Rhythm: Normal rate and regular rhythm.      Heart sounds: Normal heart sounds, S1 normal and S2 normal.   Pulmonary:      Effort: Pulmonary effort is normal. No respiratory distress.      Breath sounds: No stridor. Rales present. No wheezing.   Abdominal:      General: Bowel sounds are normal. There is no distension.      Palpations: Abdomen is soft. There is no mass.      Tenderness: There is no abdominal  tenderness. There is no guarding.   Musculoskeletal:         General: No deformity. Normal range of motion.      Cervical back: Neck supple.   Skin:     General: Skin is warm and dry.      Coloration: Skin is not pale.      Findings: No erythema or rash.   Neurological:      Mental Status: He is alert and oriented to person, place, and time.      Cranial Nerves: No cranial nerve deficit.      Comments: Generalized weakness   Psychiatric:         Mood and Affect: Mood normal.          Results Review:    I have reviewed all clinical data, test, lab, and imaging results.     Radiology  No Radiology Exams Resulted Within Past 24 Hours    Cardiology    Laboratory    Results from last 7 days   Lab Units 05/18/22  0330 05/17/22  0149 05/16/22  0113 05/15/22  0344 05/14/22  0837 05/13/22  0408 05/12/22  0501   WBC 10*3/mm3 5.20 4.90 5.80 5.40 4.80 6.70 7.20   HEMOGLOBIN g/dL 10.3* 10.1* 10.2* 10.2* 10.1* 10.4* 10.3*   HEMATOCRIT % 33.2* 33.3* 33.6* 33.0* 33.9* 34.3* 32.7*   PLATELETS 10*3/mm3 182 195 190 188 185 211 208     Results from last 7 days   Lab Units 05/18/22  0330 05/17/22  0149 05/16/22  0113 05/15/22  0344 05/14/22  0837 05/13/22  0408 05/12/22  0501   SODIUM mmol/L 129* 130* 133* 135* 136 133* 133*   POTASSIUM mmol/L 4.5 4.2 4.6 4.6 4.7 4.4 4.2   CHLORIDE mmol/L 94* 95* 97* 99 100 98 100   CO2 mmol/L 25.0 26.0 24.0 24.0 25.0 25.0 23.0   BUN mg/dL 16 11 16 13 10 16 12   CREATININE mg/dL 2.81* 2.38* 3.11* 2.68* 2.09* 2.90* 2.48*   GLUCOSE mg/dL 106* 111* 119* 116* 137* 97 122*   ALBUMIN g/dL 2.70* 2.70* 3.00* 3.10* 3.10* 2.90* 3.00*   BILIRUBIN mg/dL  --   --  0.3  --  0.4 0.3 0.3   ALK PHOS U/L  --   --  55  --  57 49 45   AST (SGOT) U/L  --   --  9  --  20 11 12   ALT (SGPT) U/L  --   --  7  --  7 6 7   CALCIUM mg/dL 7.9* 7.9* 8.2* 8.6 8.6 8.2* 8.1*                 Microbiology   Microbiology Results (last 10 days)     Procedure Component Value - Date/Time    MRSA Screen, PCR (Inpatient) - Swab, Nares  [[id number]]  (Normal) Collected: 05/11/22 0557    Lab Status: Final result Specimen: Swab from Nares Updated: 05/11/22 0804     MRSA PCR No MRSA Detected    COVID PRE-OP / PRE-PROCEDURE SCREENING ORDER (NO ISOLATION) - Swab, Nasopharynx [[id number]]  (Normal) Collected: 05/10/22 0635    Lab Status: Final result Specimen: Swab from Nasopharynx Updated: 05/10/22 1143    Narrative:      The following orders were created for panel order COVID PRE-OP / PRE-PROCEDURE SCREENING ORDER (NO ISOLATION) - Swab, Nasopharynx.  Procedure                               Abnormality         Status                     ---------                               -----------         ------                     COVID-19,CEPHEID/ROHAN,CO...[[id number]]  Normal              Final result                 Please view results for these tests on the individual orders.    COVID-19,CEPHEID/ROHAN,COR/ZEYNEP/PAD/BEATRICE IN-HOUSE(OR EMERGENT/ADD-ON),NP SWAB IN TRANSPORT MEDIA 3-4 HR TAT, RT-PCR - Swab, Nasopharynx [[id number]]  (Normal) Collected: 05/10/22 0635    Lab Status: Final result Specimen: Swab from Nasopharynx Updated: 05/10/22 1143     COVID19 Not Detected    Narrative:      Fact sheet for providers: https://www.fda.gov/media/725467/download     Fact sheet for patients: https://www.fda.gov/media/141509/download  Fact sheet for providers: https://www.fda.gov/media/264596/download     Fact sheet for patients: https://www.fda.gov/media/397737/download          Medication Review:       Schedule Meds  amiodarone, 200 mg, Oral, Q24H  apixaban, 5 mg, Oral, Q12H  aspirin, 81 mg, Oral, Daily  atorvastatin, 40 mg, Oral, Nightly  budesonide, 0.5 mg, Nebulization, BID  cefTRIAXone, 2 g, Intravenous, Q24H  clopidogrel, 75 mg, Oral, Daily  donepezil, 10 mg, Oral, Nightly  insulin lispro, 0-7 Units, Subcutaneous, TID AC  ipratropium-albuterol, 3 mL, Nebulization, TID - RT  levothyroxine, 50 mcg, Oral, Q AM  lidocaine, 20 mL, Intradermal, Once  metoprolol tartrate, 25 mg,  Oral, BID  midodrine, 15 mg, Oral, TID AC  pantoprazole, 40 mg, Oral, QAM  polyethylene glycol, 17 g, Oral, BID  sertraline, 50 mg, Oral, Daily  sodium chloride, 10 mL, Intravenous, Q12H  sodium chloride, 10 mL, Intravenous, Q12H  sorbitol, 50 mL, Oral, Daily        Infusion Meds       PRN Meds  •  acetaminophen **OR** acetaminophen **OR** acetaminophen  •  acetaminophen  •  aluminum-magnesium hydroxide-simethicone  •  atropine  •  dextrose  •  dextrose  •  glucagon (human recombinant)  •  heparin (porcine)  •  insulin lispro **AND** insulin lispro  •  ipratropium-albuterol  •  melatonin  •  ondansetron **OR** ondansetron  •  sodium chloride  •  sodium chloride  •  sodium chloride  •  sodium chloride        Assessment/Plan       Antimicrobial Therapy   1.  IV ceftriaxone        2.        3.        4.        5.            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  We need to rule out endocarditis or GI malignancy  Colonoscopy was done on May 10, 2022 and found to have polyps and biopsies are pending  -MARSHA was negative for vegetation  -Blood culture from 5/7/2022 is negative     The patient presented hospital with shortness of breath and chest x-ray showed density at the right base.  Need to rule out pneumonia  -CT showed some large bilateral pleural effusions but no obvious pneumonia which most likely secondary to volume overload  -Patient is currently on 2 L of oxygen by cannula     End-stage renal disease on hemodialysis.  Patient had right chest tunneled dialysis catheter and left arm AV fistula     History of CVA    S/p cardiac arrest with V. fib rhythm resuscitated successfully on May 11, 2022  -Patient had a heart catheterization with stent to the LAD 5/13/2020 we have reviewed the patinet's labs, notes, and imagining studies for today. We are keeping the same antimicrobial therapy at this time.     Plan     Continue ceftriaxone 2 g IV daily for  2 weeks from the last negative blood culture on 5/7/2022-last day on 5/20/2022  Cardiology is considering placing a ICD-can be done anytime at this point  Supportive care  A.mClaire Jack MD  05/18/22  17:39 EDT    Note is dictated utilizing voice recognition software/Dragon

## 2022-05-18 NOTE — PLAN OF CARE
Goal Outcome Evaluation:  Plan of Care Reviewed With: patient        Progress: no change     Pt vitals have been stable throughout the night. Pt is scheduled for dialysis tomorrow and possibly discharge back to [facility]. Pt has no complaints at this time.

## 2022-05-19 PROBLEM — R05.9 COUGH: Status: ACTIVE | Noted: 2022-01-01

## 2022-05-19 NOTE — DISCHARGE INSTRUCTIONS
Do not drink alcohol, drive, operate any heavy machinery or power tools, or make any important/legal decisions for the next 24 hours.    Call you doctor immediately if you experience severe chest pain, shortness of breath, bleeding or coughing up blood, or fever over 101 F.    Diet: Nothing by mouth until      After a bronchoscopy, you may experience a scratchy throat. This will gradually get better. You may gargle with warm salt water for this after the time noted above is over.    A responsible adult should stay with you and you should rest quietly for the rest of the day. Follow up with MD as instructed.

## 2022-05-19 NOTE — THERAPY TREATMENT NOTE
"Subjective: Pt agreeable to therapeutic plan of care.  Pt asking when he is to have his procedure later today. Going for MARSHA later today. Pt asks who the \"dude\" is in the corner of his room. Noted that pt is blind.     Objective:     Bed mobility - Max-A and Assist x 2  Transfers - N/A or Not attempted.  Ambulation - 0 feet N/A or Not attempted.     Therapeutic exercise - facilitation of sitting balance in short sitting at eob. Pt had feet on floor. Needed assist several times to use yonker to clean saliva from lips. Was able to finally achieve sitting balance in short sitting and maintained w/ close sba x 1 min prior to loss of control. Had several instances during this where he began to lose control but was able to use righting reactions to bring torso back to midline. Had hands placed on his kenes and both feet on floor for this.    Vitals: WNL    Pain: 4 VAS  Education: Provided education on importance of mobility and skilled verbal / tactile cueing throughout intervention.     Assessment: Benson Mclean did show improved sitting balance at eob. Pt remains confused. He presents with functional mobility impairments which indicate the need for skilled intervention. Tolerating session today without incident. Will continue to follow and progress as tolerated.     Plan/Recommendations:   Moderate Intensity Therapy recommended post-acute care. This is recommended as therapy feels the patient would require 3-4 days per week and wouldn't tolerate \"3 hour daily\" rehab intensity. SNF would be the preferred choice. If the patient does not agree to SNF, arrange HH or OP depending on home bound status. If patient is medically complex, consider LTACH.. Pt requires no DME at discharge.     Pt desires Skilled Rehab placement at discharge. Pt cooperative; agreeable to therapeutic recommendations and plan of care.         Basic Mobility 6-click:  Rollin = Total, A lot = 2, A little = 3; 4 = None  Supine>Sit:   1 = " Total, A lot = 2, A little = 3; 4 = None   Sit>Stand with arms:  1 = Total, A lot = 2, A little = 3; 4 = None  Bed>Chair:   1 = Total, A lot = 2, A little = 3; 4 = None  Ambulate in room:  1 = Total, A lot = 2, A little = 3; 4 = None  3-5 Steps with railin = Total, A lot = 2, A little = 3; 4 = None  Score: 7    Modified [place]: 5 = Severe disability (Requires constant nursing care and attention, bedridden, incontinent)    Post-Tx Position: Supine with HOB Elevated, Alarms activated and Call light and personal items within reach  PPE: gloves, surgical mask, eyewear protection

## 2022-05-19 NOTE — CASE MANAGEMENT/SOCIAL WORK
Continued Stay Note  DOREEN Amin     Patient Name: Benson Mclean  MRN: [id number]  Today's Date: 5/19/2022    Admit Date: 5/6/2022     Discharge Plan     Row Name 05/19/22 1220       Plan    Plan Comments Informed [facility] liaison at [facility] of potential d/c today pending pulmonary clearance and bronch today.              Phone communication or documentation only - no physical contact with patient or family.        Caron Rodriguez RN

## 2022-05-19 NOTE — PROGRESS NOTES
"Nutrition Services    Patient Name: Benson Mclean  YOB: 1950  MRN: [id number]  Admission date: 5/6/2022      PPE Documentation        PPE Worn By Provider Mask, Eye Protection    PPE Worn By Patient  N/A     PROGRESS NOTE      Encounter Information: Progress note for PO intakes + NFPE.  Possible D/C today.  HD today.  Noted with 0-25% average PO intakes since last RD review.  NFPE completed and not consistent with nutrition diagnosis of malnutrition at this time using AND/ASPEN criteria.  RD visited patient at bedside.  Patient was hard to understand but states his appetite is \"fantastic\".  Plans for bronch today.         Labs (reviewed below): Hyponatremia - monitor, possibly R/t low solute intake  Elevated Cr       GI Function:  Stool Output  Stool (mL): 0 mL (05/13/22 0514)  Stool Unmeasured Occurrence: 1 (05/19/22 0536)  Bowel Incontinence: Yes (05/19/22 0536)  Stool Amount: large (05/19/22 0536)          Nutrition Intervention: Diet advanced as able.  Continue current supplement.         PO Diet/Supplements: NPO Diet NPO Type: Strict NPO  NPO Diet NPO Type: Strict NPO     Boost Glucose Control BID    EN Prescription: None       Intake/Output:   Intake/Output Summary (Last 24 hours) at 5/19/2022 0844  Last data filed at 5/18/2022 1852  Gross per 24 hour   Intake 60 ml   Output 4000 ml   Net -3940 ml            Height: Height: 177.8 cm (70\")   Weight: Weight: 102 kg (223 lb 15.8 oz) (05/19/22 0536)   BMI: Body mass index is 32.14 kg/m².     Results from last 7 days   Lab Units 05/19/22  0537 05/18/22  0330 05/17/22  0149 05/16/22  0113 05/15/22  0344 05/14/22  0837 05/13/22  0408   SODIUM mmol/L 134* 129* 130* 133*   < > 136 133*   POTASSIUM mmol/L 4.2 4.5 4.2 4.6   < > 4.7 4.4   CHLORIDE mmol/L 97* 94* 95* 97*   < > 100 98   CO2 mmol/L 27.0 25.0 26.0 24.0   < > 25.0 25.0   BUN mg/dL 11 16 11 16   < > 10 16   CREATININE mg/dL 2.25* 2.81* 2.38* 3.11*   < > 2.09* 2.90*   CALCIUM mg/dL 8.2* 7.9* " 7.9* 8.2*   < > 8.6 8.2*   BILIRUBIN mg/dL  --   --   --  0.3  --  0.4 0.3   ALK PHOS U/L  --   --   --  55  --  57 49   ALT (SGPT) U/L  --   --   --  7  --  7 6   AST (SGOT) U/L  --   --   --  9  --  20 11   GLUCOSE mg/dL 92 106* 111* 119*   < > 137* 97    < > = values in this interval not displayed.     Results from last 7 days   Lab Units 05/19/22  0537 05/18/22  0330 05/17/22  0149 05/15/22  0344 05/14/22  0837   MAGNESIUM mg/dL 1.9 1.9 2.0  --  2.0   PHOSPHORUS mg/dL 3.2 3.4 2.9   < > 3.3   HEMOGLOBIN g/dL 10.3* 10.3* 10.1*   < > 10.1*   HEMATOCRIT % 33.1* 33.2* 33.3*   < > 33.9*   TRIGLYCERIDES mg/dL  --   --   --   --  87    < > = values in this interval not displayed.     COVID19   Date Value Ref Range Status   05/10/2022 Not Detected Not Detected - Ref. Range Final     Lab Results   Component Value Date    HGBA1C 6.2 (H) 05/06/2022     Vitals:    05/19/22 0633   BP:    Pulse: 66   Resp: 18   Temp:    SpO2:      RD to follow up per protocol.    Electronically signed by:  Promise Ramirez RD  05/19/22 08:44 EDT

## 2022-05-19 NOTE — NURSING NOTE
Marcie MAKI stated patient would be okay to discharge with midline for remaining doses of antibiotics. Perlita MAKI stated patient would be okay to discharge as long as he has his outpatient dialysis set up for tomorrow. Sujey MAKI said he was okay with discharge, and patient can follow up in 2-3 weeks. Draw MD said patient is okay to discharge and to follow up in 3 months.

## 2022-05-19 NOTE — ANESTHESIA PREPROCEDURE EVALUATION
Anesthesia Evaluation     Patient summary reviewed and Nursing notes reviewed   NPO Solid Status: > 6 hours  NPO Liquid Status: > 6 hours           Airway   Mallampati: II  TM distance: >3 FB  Neck ROM: full  No difficulty expected  Dental - normal exam     Pulmonary - normal exam    breath sounds clear to auscultation  (+) COPD, shortness of breath, sleep apnea,   Cardiovascular - normal exam    ECG reviewed  Rhythm: regular  Rate: normal    (+) hypertension, CAD, CABG, dysrhythmias, CHF , hyperlipidemia,       Neuro/Psych  (+) CVA, numbness, psychiatric history, dementia,    GI/Hepatic/Renal/Endo    (+) morbid obesity, GERD,  renal disease, diabetes mellitus, thyroid problem     Musculoskeletal     Abdominal  - normal exam    Abdomen: soft.  Bowel sounds: normal.   Substance History - negative use     OB/GYN negative ob/gyn ROS         Other   arthritis,                      Anesthesia Plan    ASA 4     MAC     intravenous induction     Anesthetic plan, all risks, benefits, and alternatives have been provided, discussed and informed consent has been obtained with: patient.  Use of blood products discussed with patient  Consented to blood products.       CODE STATUS:    Level Of Support Discussed With: Patient  Code Status (Patient has no pulse and is not breathing): CPR (Attempt to Resuscitate)  Medical Interventions (Patient has pulse or is breathing): Full Support

## 2022-05-19 NOTE — THERAPY TREATMENT NOTE
"Subjective: Pt agreeable to therapeutic plan of care.  Cognition: oriented to Person    Objective:     Bed Mobility: Max-A and Assist x 2  Functional Transfers: N/A or Not attempted.  Functional Ambulation: N/A or Not attempted.    Toileting: Max-A and Assist x 2  ADL Position: supine  ADL Comments: Patient had bladder incont requiring chaning of gown and bed linens. He required max assist for rolling bilaterally, as well as for donning clean gown and doffing soiled gown.    After supine to sit transfer, he was able to sit up at EOB with sba for 60 seconds x1 attempt. He sat up for 15 minutes requiring varying levels of assist from min to max otherwise.    Vitals: WNL    Pain: 4 VAS  Education: Provided education on importance of mobility and skilled verbal / tactile cueing throughout intervention.     Assessment: Benson Mclean presents with ADL impairments below baseline abilities which indicate the need for continued skilled intervention while inpatient. Flo continues to require assist x2 for bed mobility and is unsafe to attempt transfers. Max Ax2 for toileting and hygiene. He was also hallucinating at times this date, asking who the \"dude\" was in the corner where no one was present. Tolerating session today without incident. Will continue to follow and progress as tolerated.     Plan/Recommendations:   Moderate Intensity Therapy recommended post-acute care. This is recommended as therapy feels the patient would require 3-4 days per week and wouldn't tolerate \"3 hour daily\" rehab intensity. SNF would be the preferred choice. If the patient does not agree to SNF, arrange HH or OP depending on home bound status. If patient is medically complex, consider LTACH.   Pt desires Skilled Rehab placement at discharge. Pt cooperative; agreeable to therapeutic recommendations and plan of care.     Modified [place]: 5 = Severe disability (Requires constant nursing care and attention, bedridden, incontinent)    Post-Tx " Position: Supine with HOB Elevated, Alarms activated and Call light and personal items within reach  PPE: gloves, surgical mask, eyewear protection

## 2022-05-19 NOTE — PROGRESS NOTES
[facility] Medicine Services Daily Progress Note    Patient Name: Benson Mclean  : 1950  MRN: [id number]  Primary Care Physician:  Rudolph Rooney MD  Date of admission: 2022      Subjective      Chief Complaint: Fluid overload, incomplete dialysis, bacteremia        Patient Reports he is doing okay.  Seems more oriented today.  Pending bronchoscopy after choking spell.  Evaluation for foreign body.     ROS negative except as above    Objective      Vitals:   Temp:  [97.7 °F (36.5 °C)-98.4 °F (36.9 °C)] 98 °F (36.7 °C)  Heart Rate:  [56-74] 67  Resp:  [10-20] 10  BP: ()/(45-93) 97/45  Flow (L/min):  [2-10] 10    Physical Exam  Vitals reviewed.   Constitutional:       Comments: Frail chronically ill-appearing   HENT:      Head: Normocephalic.   Cardiovascular:      Rate and Rhythm: Normal rate.   Pulmonary:      Effort: Pulmonary effort is normal.      Breath sounds: Rhonchi present.   Abdominal:      General: Abdomen is flat.      Palpations: Abdomen is soft.   Musculoskeletal:         General: Normal range of motion.      Cervical back: Normal range of motion.   Skin:     General: Skin is warm.   Neurological:      General: No focal deficit present.      Mental Status: He is alert and oriented to person, place, and time.  Patient is very repetitive today  Psychiatric:         Mood and Affect: Mood normal.         Behavior: Behavior altered today.        Result Review    Result Review:  I have personally reviewed the results from the time of this admission to 2022 15:48 EDT and agree with these findings:  [x]  Laboratory  []  Microbiology  []  Radiology  []  EKG/Telemetry   []  Cardiology/Vascular   []  Pathology  []  Old records  []  Other:  Most notable findings include: Abnormal renal function.    Wounds (last 24 hours)     LDA Wound     Row Name 22 0800 22 0411 22 0000       Wound 22 1706 Right heel Pressure Injury    Wound - Properties  Group Placement Date: 05/06/22  - Placement Time: 1706 -JP Present on Hospital Admission: Y  -MAGNO Side: Right  -MAGNO Location: heel  -MAGNO Primary Wound Type: Pressure inj  -BREANNE    Dressing Appearance dry;intact  -KD intact;dry  -AS dry;intact  -AS    Closure -- Adhesive bandage  -AS Adhesive bandage  -AS    Base dressing in place, unable to visualize  -KD dressing in place, unable to visualize  -AS dressing in place, unable to visualize  -AS    Periwound intact;dry;blanchable  -KD -- --    Drainage Amount none  -KD -- --    Dressing Care silicone;foam  -KD -- --    Periwound Care dry periwound area maintained  -KD -- --    Retired Wound - Properties Group Placement Date: 05/06/22  - Placement Time: 1706 -MAGNO Present on Hospital Admission: Y  -MAGNO Side: Right  -MAGNO Location: heel  -MAGNO Primary Wound Type: Pressure inj  -BREANNE    Retired Wound - Properties Group Date first assessed: 05/06/22  - Time first assessed: 1706 -MAGNO Present on Hospital Admission: Y  -MAGNO Side: Right  -MAGNO Location: heel  -MAGNO Primary Wound Type: Pressure inj  -BREANNE       [REMOVED] Wound 05/06/22 1710 Right anterior greater trochanter    Wound - Properties Group Placement Date: 05/06/22  - Placement Time: 1710 -JP Present on Hospital Admission: Y  -MAGNO Side: Right  -MAGNO Orientation: anterior  -MAGNO Location: greater trochanter  -MAGNO Removal Date: 05/19/22  -KD Removal Time: 0800  -KD Wound Outcome: Healed  -KD    Dressing Appearance -- open to air  -AS open to air  -AS    Closure -- Open to air  -AS Open to air  -AS    Base -- pink;scab;dry  -AS pink;scab;dry  -AS    Drainage Amount -- none  -AS none  -AS    Retired Wound - Properties Group Placement Date: 05/06/22  - Placement Time: 1710 -JP Present on Hospital Admission: Y  -MAGNO Side: Right  -MAGNO Orientation: anterior  -MAGNO Location: greater trochanter  -MAGNO Removal Date: 05/19/22  -KD Removal Time: 0800  -KD Wound Outcome: Healed  -KD    Retired Wound - Properties Group Date first assessed: 05/06/22   -MAGNO Time first assessed: 1710  -MAGNO Present on Hospital Admission: Y  -MAGNO Side: Right  -MAGNO Location: greater trochanter  -MAGNO Resolution Date: 05/19/22  -KD Resolution Time: 0800  -KD Wound Outcome: Healed  -KD       Wound 03/30/22 0341 Left posterior thigh    Wound - Properties Group Placement Date: 03/30/22  -AS Placement Time: 0341  -AS Present on Hospital Admission: Y  -AS Side: Left  -AS Orientation: posterior  -AS Location: thigh  -AS    Dressing Appearance open to air  -KD dry;intact  -AS dry;intact  -AS    Closure -- Adhesive bandage  -AS Adhesive bandage  -AS    Base dry;non-blanchable  -KD dressing in place, unable to visualize  -AS dressing in place, unable to visualize  -AS    Periwound intact;dry;blanchable  -KD -- --    Drainage Amount none  -KD none  -AS none  -AS    Care, Wound cleansed with;soap and water  -KD -- --    Periwound Care barrier ointment applied;dry periwound area maintained  -KD -- --    Retired Wound - Properties Group Placement Date: 03/30/22  -AS Placement Time: 0341  -AS Present on Hospital Admission: Y  -AS Side: Left  -AS Orientation: posterior  -AS Location: thigh  -AS    Retired Wound - Properties Group Date first assessed: 03/30/22  -AS Time first assessed: 0341  -AS Present on Hospital Admission: Y  -AS Side: Left  -AS Location: thigh  -AS       Wound 03/30/22 0340 perineum Pressure Injury    Wound - Properties Group Placement Date: 03/30/22  -AS Placement Time: 0340  -AS Present on Hospital Admission: Y  -AS Location: perineum  -AS Primary Wound Type: Pressure inj  -AS    Dressing Appearance open to air  -KD open to air  -AS open to air  -AS    Closure -- Open to air  -AS Open to air  -AS    Base pink;moist;non-blanchable  -KD dry;clean;pink;scab  -AS dry;clean;pink;scab  -AS    Periwound excoriated;intact;blanchable  -KD excoriated  -AS excoriated  -AS    Drainage Amount none  -KD none  -AS none  -AS    Care, Wound cleansed with;soap and water  -KD -- --    Periwound Care  barrier ointment applied;dry periwound area maintained  -KD -- --    Retired Wound - Properties Group Placement Date: 03/30/22  -AS Placement Time: 0340  -AS Present on Hospital Admission: Y  -AS Location: perineum  -AS Primary Wound Type: Pressure inj  -AS    Retired Wound - Properties Group Date first assessed: 03/30/22  -AS Time first assessed: 0340  -AS Present on Hospital Admission: Y  -AS Location: perineum  -AS Primary Wound Type: Pressure inj  -AS       Wound 05/11/22 0230 Right posterior hand Skin Tear    Wound - Properties Group Placement Date: 05/11/22  - Placement Time: 0230  - Side: Right  - Orientation: posterior  - Location: hand  - Primary Wound Type: Skin tear  -    Dressing Appearance dry;intact;no drainage  -KD intact;dry  -AS dry;intact  -AS    Closure -- Adhesive bandage  -AS Adhesive bandage  -AS    Base dressing in place, unable to visualize  -KD dressing in place, unable to visualize  -AS dressing in place, unable to visualize  -AS    Periwound ecchymotic  -KD -- --    Drainage Amount none  -KD -- --    Dressing Care silicone;foam  -KD -- --    Periwound Care dry periwound area maintained  -KD -- --    Retired Wound - Properties Group Placement Date: 05/11/22  - Placement Time: 0230  - Side: Right  - Orientation: posterior  - Location: hand  - Primary Wound Type: Skin tear  -    Retired Wound - Properties Group Date first assessed: 05/11/22  - Time first assessed: 0230  - Side: Right  - Location: hand  - Primary Wound Type: Skin tear  -    Row Name 05/18/22 2000             Wound 05/06/22 1706 Right heel Pressure Injury    Wound - Properties Group Placement Date: 05/06/22  -MAGNO Placement Time: 1706  -MAGNO Present on Hospital Admission: Y  -MAGNO Side: Right  -MAGNO Location: heel  -MAGNO Primary Wound Type: Pressure inj  -BREANNE      Pressure Injury Stage 2  -AS      Dressing Appearance dry;intact  -AS      Closure Adhesive bandage  -AS      Base dressing in place, unable to  visualize  -AS      Retired Wound - Properties Group Placement Date: 05/06/22  -MAGNO Placement Time: 1706 -JP Present on Hospital Admission: Y  -MAGNO Side: Right  -MAGNO Location: heel  -MAGNO Primary Wound Type: Pressure inj  -BREANNE      Retired Wound - Properties Group Date first assessed: 05/06/22  -MAGNO Time first assessed: 1706 -JP Present on Hospital Admission: Y  -MAGNO Side: Right  -MAGNO Location: heel  -MAGNO Primary Wound Type: Pressure inj  -BREANNE              [REMOVED] Wound 05/06/22 1710 Right anterior greater trochanter    Wound - Properties Group Placement Date: 05/06/22  -MAGNO Placement Time: 1710 -JP Present on Hospital Admission: Y  -MAGNO Side: Right  -MAGNO Orientation: anterior  -MAGNO Location: greater trochanter  -MAGNO Removal Date: 05/19/22  -KD Removal Time: 0800 -KD Wound Outcome: Healed  -KD      Pressure Injury Stage 2  -AS      Dressing Appearance open to air  -AS      Closure Open to air  -AS      Base pink;scab;dry  -AS      Drainage Amount none  -AS      Retired Wound - Properties Group Placement Date: 05/06/22  -MAGNO Placement Time: 1710 -JP Present on Hospital Admission: Y  -MAGNO Side: Right  -MAGNO Orientation: anterior  -MAGNO Location: greater trochanter  -MAGNO Removal Date: 05/19/22  -KD Removal Time: 0800  -KD Wound Outcome: Healed  -KD      Retired Wound - Properties Group Date first assessed: 05/06/22  - Time first assessed: 1710 -JP Present on Hospital Admission: Y  -MAGNO Side: Right  -MAGNO Location: greater trochanter  -MAGNO Resolution Date: 05/19/22  -KD Resolution Time: 0800  -KD Wound Outcome: Healed  -KD              Wound 03/30/22 0341 Left posterior thigh    Wound - Properties Group Placement Date: 03/30/22  -AS Placement Time: 0341  -AS Present on Hospital Admission: Y  -AS Side: Left  -AS Orientation: posterior  -AS Location: thigh  -AS      Pressure Injury Stage 2  -AS      Dressing Appearance dry;intact  -AS      Closure Adhesive bandage  -AS      Base dressing in place, unable to visualize  -AS      Drainage  Amount none  -AS      Retired Wound - Properties Group Placement Date: 03/30/22  -AS Placement Time: 0341  -AS Present on Hospital Admission: Y  -AS Side: Left  -AS Orientation: posterior  -AS Location: thigh  -AS      Retired Wound - Properties Group Date first assessed: 03/30/22  -AS Time first assessed: 0341  -AS Present on Hospital Admission: Y  -AS Side: Left  -AS Location: thigh  -AS              Wound 03/30/22 0340 perineum Pressure Injury    Wound - Properties Group Placement Date: 03/30/22  -AS Placement Time: 0340  -AS Present on Hospital Admission: Y  -AS Location: perineum  -AS Primary Wound Type: Pressure inj  -AS      Pressure Injury Stage 2  -AS      Dressing Appearance open to air  -AS      Closure Open to air  -AS      Base dry;clean;pink;scab  -AS      Periwound excoriated  -AS      Drainage Amount none  -AS      Care, Wound barrier applied  -AS      Retired Wound - Properties Group Placement Date: 03/30/22  -AS Placement Time: 0340  -AS Present on Hospital Admission: Y  -AS Location: perineum  -AS Primary Wound Type: Pressure inj  -AS      Retired Wound - Properties Group Date first assessed: 03/30/22  -AS Time first assessed: 0340  -AS Present on Hospital Admission: Y  -AS Location: perineum  -AS Primary Wound Type: Pressure inj  -AS              Wound 05/11/22 0230 Right posterior hand Skin Tear    Wound - Properties Group Placement Date: 05/11/22  - Placement Time: 0230  - Side: Right  - Orientation: posterior  - Location: hand  - Primary Wound Type: Skin tear  -      Dressing Appearance dry;intact  -AS      Closure Adhesive bandage  -AS      Base dressing in place, unable to visualize  -AS      Retired Wound - Properties Group Placement Date: 05/11/22  - Placement Time: 0230  - Side: Right  - Orientation: posterior  - Location: hand  - Primary Wound Type: Skin tear  -      Retired Wound - Properties Group Date first assessed: 05/11/22  - Time first assessed: 0230  -  Side: Right  - Location: hand  - Primary Wound Type: Skin tear  -            User Key  (r) = Recorded By, (t) = Taken By, (c) = Cosigned By    Initials Name Provider Type    Jessica Isaac, RN Registered Nurse    Zainab Griffiths, RN Registered Nurse    Zainab Manuel RN Registered Nurse    Ruba Hayes, RN Registered Nurse    Maricruz Jiménez RN Registered Nurse                   Assessment/Plan    71-year-old gentleman with fluid overload and bacteremia     Active Hospital Problems:          Active Hospital Problems     Diagnosis     • **Fluid overload     • Chronic respiratory failure with hypoxia, on home oxygen therapy ([facility])     • Elevated troponin     • Hypothyroidism (acquired)     • End stage renal disease ([facility])     • Anemia of renal disease     • Mixed hyperlipidemia     • Major depressive disorder, recurrent, mild ([facility])     • Flaccid hemiplegia of right dominant side as late effect of cerebral infarction ([facility])     • COPD with acute exacerbation ([facility])     • S/P CABG (coronary artery bypass graft)     • Essential hypertension     • ANNETTE treated with BiPAP        Plan:   Shortness of breath secondary to fluid overload  Chronic respiratory failure with oxygen dependence    -Improving.  -CXR: small right greater than left pleural effusions.  Bibasilar airspace disease, right greater than left, favored to represent atelectasis.   -proBNP >70,000 (previous proBNP march 2022 was also >70,000)       -will recheck in the a.m.  -pt on his home rate of 3L O2 via [place]  -pt did not complete dialysis prior to admission  -nephrology consulted appreciate assistance     Strep bovis bacteremia.  ID is following.  IV antibiotics ordered  Pending culture of dialysis catheter  Pending final identification  Echo   Appreciate ID assistance  Rocephin until May 20 IV.  1 day left.     Chronically elevated troponin  -troponin 0.33 compared to troponin 0.146 on 3/12/2022.   -Will trend      ESRD on HD  MWF  -on midodrine for BP support  -nephrology to manage     CVA with right sided residual weakness  -on eliquis, statin       -restart Elliquis in the am.     CAD s/p CABG, PCI  Chronic atrial fibrillation  Hyperlipidemia  -chronic, controlled.   -continue Eliquis, atorvastatin, and metoprolol  -For MARSHA cardiac cath for ablation treatment      DM type 2  -hemoglobin A1c 6.3 on 3/12/2022 and previous admission basal insulin stopped due to hypoglycemia  -SSI AC/HS     Hypothyroidism  -Continue home synthroid     Major depressive disorder, recurrent, mild   Dementia  -Continue home zoloft, aricept     ANNETTE  -cpap qhs      Obesity (BMI 30-39.9)  BMI 36.92  -Lifestyle modifications to reduce cardiovascular risk factors     Dysphagia  Patient complaining about his current diet.  Speech following.  Pending microscopy May 19     Possible AICD placement deferred to a later time.    Patient was evaluated for LifeVest but did not qualify as he is not able to follow directions if the vest prompts him.          DVT prophylaxis: eliquis      CODE STATUS:    Admission Status:  I believe this patient meets inpatient status.     I discussed the patient's findings and my recommendations with patient.     This patient has been examined wearing appropriate Personal Protective Equipment.    05/19/22      Electronically signed by Sunny Shannon MD, 05/19/22, 15:48 EDT.  [facility]ist Team

## 2022-05-19 NOTE — PROGRESS NOTES
Referring Provider: Sunny Shannon MD    Reason for follow-up:  Shortness of breath  Recent ventricular fibrillation.  Bacteremia  Status post stent       Patient Care Team:  Rudolph Rooney MD as PCP - General (Family Medicine)  Samantha Zabala APRN as PCP - Family Medicine  Jose G Rm MD as Consulting Physician (Cardiology)    Subjective .    ROS  Mild confusion.  Since I have last seen him yesterday, the patient has been without any chest discomfort ,shortness of breath, palpitations, dizziness or syncope.  Denies having any headache ,abdominal pain ,nausea, vomiting , diarrhea constipation, loss of weight or loss of appetite.  Denies having any excessive bruising ,hematuria or blood in the stool.    Review of all systems negative except as indicated    History  Past Medical History:   Diagnosis Date   • A-fib ([facility]) 8/3/2021   • Anemia    • Appetite loss    • Arthritis    • Brain bleed ([facility])    • Carpal tunnel syndrome on left    • CHF (congestive heart failure) ([facility])    • Chronic left-sided low back pain without sciatica 12/27/2017   • CKD (chronic kidney disease) stage 3, GFR 30-59 ml/min ([facility])    • Closed fracture of seventh thoracic vertebra ([facility]) 8/23/2020   • Cognitive communication deficit 12/1/2020   • COPD (chronic obstructive pulmonary disease) ([facility])    • Coronary artery disease    • COVID-19 2/19/2021   • Cytokine release syndrome, grade 1 5/24/2021   • Depression    • Diabetic neuropathy ([facility])    • Dialysis patient ([facility])     mon wed fri   • DM2 (diabetes mellitus, type 2) ([facility])    • GERD (gastroesophageal reflux disease)    • Hyperlipidemia    • Hypertension    • Hypogonadism in male    • Neuropathy    • Nonsustained ventricular tachycardia ([facility]) 7/23/2021   • Obesity    • Paroxysmal atrial fibrillation ([facility]) 12/1/2020   • Sleep apnea    • Stroke ([facility])    • Unsteady gait        Past Surgical History:   Procedure Laterality Date   • ANGIOPLASTY      X2   • APPENDECTOMY     • ARTERIOVENOUS  FISTULA/SHUNT SURGERY Left 1/20/2022    Procedure: LEFT BRACHIAL CEPHALIC ARTERIAL VENOUS FISTULA CREATION;  Surgeon: Yony Davila MD;  Location: [facility] MAIN OR;  Service: Vascular;  Laterality: Left;   • CARDIAC CATHETERIZATION  11/13/2015   • CARDIAC CATHETERIZATION N/A 5/24/2021    Procedure: Left Heart Cath and coronary angiogram;  Surgeon: Jose G Rm MD;  Location:  [facility] CATH INVASIVE LOCATION;  Service: Cardiovascular;  Laterality: N/A;   • CARDIAC CATHETERIZATION  5/24/2021    Procedure: Saphenous Vein Graft;  Surgeon: Jose G Rm MD;  Location:  [facility] CATH INVASIVE LOCATION;  Service: Cardiovascular;;   • CARDIAC CATHETERIZATION N/A 5/24/2021    Procedure: Percutaneous Coronary Intervention;  Surgeon: Bernabe Zambrano MD;  Location:  [facility] CATH INVASIVE LOCATION;  Service: Cardiology;  Laterality: N/A;   • CARDIAC CATHETERIZATION N/A 5/24/2021    Procedure: Stent NIELS coronary;  Surgeon: Bernabe Zambrano MD;  Location: [facility] CATH INVASIVE LOCATION;  Service: Cardiology;  Laterality: N/A;   • CARDIAC CATHETERIZATION N/A 5/26/2021    Procedure: Percutaneous Coronary Intervention;  Surgeon: Bernabe Zambrano MD;  Location:  [facility] CATH INVASIVE LOCATION;  Service: Cardiovascular;  Laterality: N/A;   • CARDIAC CATHETERIZATION N/A 5/26/2021    Procedure: Stent NIELS bypass graft;  Surgeon: Bernabe Zambrano MD;  Location:  [facility] CATH INVASIVE LOCATION;  Service: Cardiovascular;  Laterality: N/A;   • CARDIAC CATHETERIZATION N/A 5/13/2022    Procedure: Left Heart Cath and coronary angiogram;  Surgeon: Jose G Rm MD;  Location:  [facility] CATH INVASIVE LOCATION;  Service: Cardiovascular;  Laterality: N/A;   • CARDIAC CATHETERIZATION  5/13/2022    Procedure: Saphenous Vein Graft;  Surgeon: Jose G Rm MD;  Location:  [facility] CATH INVASIVE LOCATION;  Service: Cardiovascular;;   • CARDIAC CATHETERIZATION N/A 5/13/2022    Procedure: Stent NIELS coronary;  Surgeon: Estefany Hyde MD;  Location:  [facility] CATH INVASIVE LOCATION;  Service: Cardiovascular;  Laterality: N/A;   • CARDIAC ELECTROPHYSIOLOGY PROCEDURE N/A 5/24/2021    Procedure: Temporary Pacemaker;  Surgeon: Bernabe Zambrano MD;  Location: [facility] CATH INVASIVE LOCATION;  Service: Cardiology;  Laterality: N/A;   • CARDIAC ELECTROPHYSIOLOGY PROCEDURE N/A 5/26/2021    Procedure: Temporary Pacemaker;  Surgeon: Bernabe Zambrano MD;  Location: [facility] CATH INVASIVE LOCATION;  Service: Cardiovascular;  Laterality: N/A;   • CARPAL TUNNEL RELEASE Left 04/29/2018    carpal tunnel- lt hand// other hand surgeries    • CATARACT EXTRACTION, BILATERAL  2002    Dr. Lux Acosta   • CHOLECYSTECTOMY     • COLON RESECTION  2005   • COLONOSCOPY N/A 5/10/2022    Procedure: COLONOSCOPY with polypectomy x3;  Surgeon: Herbie Keane MD;  Location: [facility] ENDOSCOPY;  Service: Gastroenterology;  Laterality: N/A;  colon polyps;internal hemorrhoids   • CORONARY ANGIOPLASTY     • CORONARY ANGIOPLASTY WITH STENT PLACEMENT  11/13/2015    PTCA stent to proximal in stent and mid to distal lad   • CORONARY ANGIOPLASTY WITH STENT PLACEMENT  09/16/2016    PTCA stent to mid lad and stent to vein graft to marginal   • CORONARY ARTERY BYPASS GRAFT  2005    @ [facility]   • CYST REMOVAL      cyst removed from scrotum   • FOOT SURGERY Right 07/17/2018   • FOOT SURGERY Left 02/04/2019   • PORTACATH PLACEMENT     • TOE AMPUTATION Right     right toe removed D/T infected cut that went to the bone       Family History   Problem Relation Age of Onset   • Heart disease Mother    • Heart disease Father    • Diabetes Sister    • Heart disease Sister    • Diabetes Brother    • Mental illness Brother        Social History     Tobacco Use   • Smoking status: Former Smoker     Packs/day: 1.00     Years: 60.00     Pack years: 60.00     Types: Cigarettes   • Smokeless tobacco: Never Used   • Tobacco comment: Patient quit smoking 11/2020   Vaping Use   • Vaping Use: Never used   Substance Use Topics   • Alcohol use:  No   • Drug use: Yes     Frequency: 1.0 times per week     Types: Marijuana     Comment: socially        Medications Prior to Admission   Medication Sig Dispense Refill Last Dose   • albuterol sulfate  (90 Base) MCG/ACT inhaler Inhale 2 puffs Every 4 (Four) Hours.   5/6/2022 at Unknown time   • apixaban (ELIQUIS) 5 MG tablet tablet Take 1 tablet by mouth Every 12 (Twelve) Hours. Indications: Atrial Fibrillation 60 tablet  5/6/2022 at Unknown time   • atorvastatin (LIPITOR) 40 MG tablet Take 40 mg by mouth Every Night.   5/5/2022 at Unknown time   • budesonide (Pulmicort) 0.5 MG/2ML nebulizer solution Take 2 mL by nebulization 2 (Two) Times a Day.   5/6/2022 at Unknown time   • cholecalciferol (VITAMIN D3) 25 MCG (1000 UT) tablet Take 1,000 Units by mouth Daily.   5/6/2022 at Unknown time   • docusate sodium (COLACE) 100 MG capsule Take 100 mg by mouth Daily.   5/6/2022 at Unknown time   • donepezil (ARICEPT) 10 MG tablet Take 10 mg by mouth Every Night.   5/5/2022 at Unknown time   • fluticasone (FLONASE) 50 MCG/ACT nasal spray 2 sprays by Each Nare route 2 (Two) Times a Day.   5/6/2022 at Unknown time   • guaiFENesin (MUCINEX) 600 MG 12 hr tablet Take 600 mg by mouth 2 (Two) Times a Day.   5/6/2022 at Unknown time   • HYDROcodone-acetaminophen (NORCO) 5-325 MG per tablet Take 1 tablet by mouth 3 (Three) Times a Day.   5/6/2022 at Unknown time   • ipratropium-albuterol (DUO-NEB) 0.5-2.5 mg/3 ml nebulizer Take 3 mL by nebulization 3 (Three) Times a Day. 360 mL  5/6/2022 at Unknown time   • ipratropium-albuterol (DUO-NEB) 0.5-2.5 mg/3 ml nebulizer Take 3 mL by nebulization Every 4 (Four) Hours As Needed for Wheezing. 360 mL  5/6/2022 at Unknown time   • levothyroxine (SYNTHROID, LEVOTHROID) 50 MCG tablet Take 50 mcg by mouth Every Morning.   5/6/2022 at Unknown time   • Metoprolol Tartrate 37.5 MG tablet Take 37.5 mg by mouth 2 (Two) Times a Day. Hold for SBP <110 or HR < 60   5/6/2022 at Unknown time   •  midodrine (PROAMATINE) 10 MG tablet Take 10 mg by mouth 3 (Three) Times a Day Before Meals. Hold for BP > 140/90   5/6/2022 at Unknown time   • omeprazole (priLOSEC) 20 MG capsule Take 20 mg by mouth Daily.   5/6/2022 at Unknown time   • polyethylene glycol (MiraLax) 17 g packet Take 17 g by mouth 2 (Two) Times a Day.   5/6/2022 at Unknown time   • sertraline (ZOLOFT) 50 MG tablet Take 50 mg by mouth Daily.   5/6/2022 at Unknown time   • vitamin B-12 (CYANOCOBALAMIN) 1000 MCG tablet Take 1,000 mcg by mouth Daily.   5/6/2022 at Unknown time   • bisacodyl (DULCOLAX) 10 MG suppository Insert 10 mg into the rectum Daily As Needed for Constipation.   Unknown at Unknown time   • magnesium hydroxide (MILK OF MAGNESIA) 400 MG/5ML suspension Take 30 mL by mouth Daily As Needed for Constipation.   Unknown at Unknown time   • ondansetron (ZOFRAN) 4 MG tablet Take 4 mg by mouth Every 8 (Eight) Hours As Needed for Nausea or Vomiting.   Unknown at Unknown time   • phenylephrine-mineral oil-petrolatum (Preparation H) 0.25-14-74.9 % ointment hemorrhoidal ointment Insert 1 application into the rectum Daily As Needed.   Unknown at Unknown time       Allergies  Codeine    Scheduled Meds:amiodarone, 200 mg, Oral, Q24H  apixaban, 5 mg, Oral, Q12H  aspirin, 81 mg, Oral, Daily  atorvastatin, 40 mg, Oral, Nightly  budesonide, 0.5 mg, Nebulization, BID  cefTRIAXone, 2 g, Intravenous, Q24H  clopidogrel, 75 mg, Oral, Daily  donepezil, 10 mg, Oral, Nightly  insulin lispro, 0-7 Units, Subcutaneous, TID AC  ipratropium-albuterol, 3 mL, Nebulization, TID - RT  levothyroxine, 50 mcg, Oral, Q AM  lidocaine, 20 mL, Intradermal, Once  metoprolol tartrate, 25 mg, Oral, BID  midodrine, 15 mg, Oral, TID AC  pantoprazole, 40 mg, Oral, QAM  polyethylene glycol, 17 g, Oral, BID  sertraline, 50 mg, Oral, Daily  sodium chloride, 10 mL, Intravenous, Q12H  sodium chloride, 10 mL, Intravenous, Q12H  sorbitol, 50 mL, Oral, Daily      Continuous Infusions:   PRN  "Meds:.•  acetaminophen **OR** acetaminophen **OR** acetaminophen  •  acetaminophen  •  aluminum-magnesium hydroxide-simethicone  •  atropine  •  dextrose  •  dextrose  •  glucagon (human recombinant)  •  heparin (porcine)  •  insulin lispro **AND** insulin lispro  •  ipratropium-albuterol  •  melatonin  •  ondansetron **OR** ondansetron  •  sodium chloride  •  sodium chloride  •  sodium chloride  •  sodium chloride    Objective     VITAL SIGNS  Vitals:    05/18/22 2250 05/18/22 2355 05/19/22 0223 05/19/22 0536   BP: 99/70 117/61 108/47 115/65   BP Location:   Right arm Right arm   Patient Position:   Lying Lying   Pulse: 70 67 56 72   Resp:   18 20   Temp:   98.1 °F (36.7 °C) 97.7 °F (36.5 °C)   TempSrc:   Oral Oral   SpO2: 100% 97% 99% 96%   Weight:       Height:           Flowsheet Rows    Flowsheet Row First Filed Value   Admission Height 177.8 cm (70\") Documented at 05/06/2022 1026   Admission Weight 104 kg (229 lb 0.9 oz) Documented at 05/06/2022 1026            Intake/Output Summary (Last 24 hours) at 5/19/2022 0620  Last data filed at 5/18/2022 1852  Gross per 24 hour   Intake 60 ml   Output 4000 ml   Net -3940 ml        TELEMETRY: Atrial fibrillation    Physical Exam:  The patient is not in distress.  Confusion.  Vital signs as noted above.  Exogenous obesity.  Head and neck revealed no carotid bruits or jugular venous distention.  No thyromegaly or lymphadenopathy is present  Lungs clear.  No wheezing.  Breath sounds are normal bilaterally.  Heart normal first and second heart sounds.  No murmur. No precordial rub is present.  No gallop is present.  Abdomen soft and nontender.  No organomegaly is present.  Extremities with good peripheral pulses without any pedal edema.  Cardiac cath site looks normal.  Skin warm and dry.  Musculoskeletal system is grossly normal  CNS grossly normal except for mild confusion      Results Review:   I reviewed the patient's new clinical results.  Lab Results (last 24 hours)  "    Procedure Component Value Units Date/Time    Renal Function Panel [[id number]]  (Abnormal) Collected: 05/19/22 0537    Specimen: Blood Updated: 05/19/22 0612     Glucose 92 mg/dL      BUN 11 mg/dL      Creatinine 2.25 mg/dL      Sodium 134 mmol/L      Potassium 4.2 mmol/L      Chloride 97 mmol/L      CO2 27.0 mmol/L      Calcium 8.2 mg/dL      Albumin 2.70 g/dL      Phosphorus 3.2 mg/dL      Anion Gap 10.0 mmol/L      BUN/Creatinine Ratio 4.9     eGFR 30.4 mL/min/1.73      Comment: National Kidney Foundation and American Society of Nephrology (ASN) Task Force recommended calculation based on the Chronic Kidney Disease Epidemiology Collaboration (CKD-EPI) equation refit without adjustment for race.       Narrative:      GFR Normal >60  Chronic Kidney Disease <60  Kidney Failure <15      C-reactive Protein [[id number]]  (Abnormal) Collected: 05/19/22 0537    Specimen: Blood Updated: 05/19/22 0612     C-Reactive Protein 3.03 mg/dL     Magnesium [[id number]]  (Normal) Collected: 05/19/22 0537    Specimen: Blood Updated: 05/19/22 0612     Magnesium 1.9 mg/dL     CBC & Differential [[id number]]  (Abnormal) Collected: 05/19/22 0537    Specimen: Blood Updated: 05/19/22 0547    Narrative:      The following orders were created for panel order CBC & Differential.  Procedure                               Abnormality         Status                     ---------                               -----------         ------                     CBC Auto Differential[[id number]]        Abnormal            Final result                 Please view results for these tests on the individual orders.    CBC Auto Differential [[id number]]  (Abnormal) Collected: 05/19/22 0537    Specimen: Blood Updated: 05/19/22 0547     WBC 5.30 10*3/mm3      RBC 3.50 10*6/mm3      Hemoglobin 10.3 g/dL      Hematocrit 33.1 %      MCV 94.6 fL      MCH 29.3 pg      MCHC 31.0 g/dL      RDW 17.4 %      RDW-SD 59.1 fl      MPV 7.8 fL      Platelets 203 10*3/mm3       Neutrophil % 69.6 %      Lymphocyte % 20.6 %      Monocyte % 8.0 %      Eosinophil % 0.6 %      Basophil % 1.2 %      Neutrophils, Absolute 3.70 10*3/mm3      Lymphocytes, Absolute 1.10 10*3/mm3      Monocytes, Absolute 0.40 10*3/mm3      Eosinophils, Absolute 0.00 10*3/mm3      Basophils, Absolute 0.10 10*3/mm3      nRBC 0.1 /100 WBC     POC Glucose Once [[id number]]  (Abnormal) Collected: 05/18/22 1918    Specimen: Blood Updated: 05/18/22 1919     Glucose 108 mg/dL      Comment: Serial Number: [id number]:  [id number]       POC Glucose Once [[id number]]  (Abnormal) Collected: 05/18/22 1138    Specimen: Blood Updated: 05/18/22 1139     Glucose 124 mg/dL      Comment: Serial Number: [id number]:  [id number]       POC Glucose Once [[id number]]  (Normal) Collected: 05/18/22 0722    Specimen: Blood Updated: 05/18/22 0723     Glucose 96 mg/dL      Comment: Serial Number: [id number]:  [id number]             Imaging Results (Last 24 Hours)     ** No results found for the last 24 hours. **      LAB RESULTS (LAST 7 DAYS)    CBC  Results from last 7 days   Lab Units 05/19/22  0537 05/18/22  0330 05/17/22  0149 05/16/22  0113 05/15/22  0344 05/14/22  0837 05/13/22  0408   WBC 10*3/mm3 5.30 5.20 4.90 5.80 5.40 4.80 6.70   RBC 10*6/mm3 3.50* 3.53* 3.51* 3.49* 3.45* 3.54* 3.58*   HEMOGLOBIN g/dL 10.3* 10.3* 10.1* 10.2* 10.2* 10.1* 10.4*   HEMATOCRIT % 33.1* 33.2* 33.3* 33.6* 33.0* 33.9* 34.3*   MCV fL 94.6 94.1 95.0 96.4 95.8 95.8 96.0   PLATELETS 10*3/mm3 203 182 195 190 188 185 211       BMP  Results from last 7 days   Lab Units 05/19/22  0537 05/18/22  0330 05/17/22  0149 05/16/22  0113 05/15/22  0344 05/14/22  0837 05/13/22  0408 05/12/22  0949   SODIUM mmol/L 134* 129* 130* 133* 135* 136 133*  --    POTASSIUM mmol/L 4.2 4.5 4.2 4.6 4.6 4.7 4.4  --    CHLORIDE mmol/L 97* 94* 95* 97* 99 100 98  --    CO2 mmol/L 27.0 25.0 26.0 24.0 24.0 25.0 25.0  --    BUN mg/dL 11 16 11 16 13 10 16  --    CREATININE mg/dL 2.25*  2.81* 2.38* 3.11* 2.68* 2.09* 2.90*  --    GLUCOSE mg/dL 92 106* 111* 119* 116* 137* 97  --    MAGNESIUM mg/dL 1.9 1.9 2.0  --   --  2.0 2.3 2.2   PHOSPHORUS mg/dL 3.2 3.4 2.9 4.0 3.8 3.3 3.2  --        CMP   Results from last 7 days   Lab Units 05/19/22  0537 05/18/22  0330 05/17/22  0149 05/16/22  0113 05/15/22  0344 05/14/22  0837 05/13/22  0408   SODIUM mmol/L 134* 129* 130* 133* 135* 136 133*   POTASSIUM mmol/L 4.2 4.5 4.2 4.6 4.6 4.7 4.4   CHLORIDE mmol/L 97* 94* 95* 97* 99 100 98   CO2 mmol/L 27.0 25.0 26.0 24.0 24.0 25.0 25.0   BUN mg/dL 11 16 11 16 13 10 16   CREATININE mg/dL 2.25* 2.81* 2.38* 3.11* 2.68* 2.09* 2.90*   GLUCOSE mg/dL 92 106* 111* 119* 116* 137* 97   ALBUMIN g/dL 2.70* 2.70* 2.70* 3.00* 3.10* 3.10* 2.90*   BILIRUBIN mg/dL  --   --   --  0.3  --  0.4 0.3   ALK PHOS U/L  --   --   --  55  --  57 49   AST (SGOT) U/L  --   --   --  9  --  20 11   ALT (SGPT) U/L  --   --   --  7  --  7 6         BNP        TROPONIN  Results from last 7 days   Lab Units 05/13/22  0408   TROPONIN T ng/mL 0.583*       CoAg  Results from last 7 days   Lab Units 05/13/22  0408   INR  1.12*       Creatinine Clearance  Estimated Creatinine Clearance: 36 mL/min (A) (by C-G formula based on SCr of 2.25 mg/dL (H)).    ABG        Radiology  No radiology results for the last day        EKG                      I personally viewed and interpreted the patient's EKG/Telemetry data: Atrial fibrillation    ECHOCARDIOGRAM:    Results for orders placed during the hospital encounter of 05/06/22    Adult Transesophageal Echo (MARSHA) W/ Cont if Necessary Per Protocol    Interpretation Summary  Date of study  5/13/2022    Indications  Bacteremia  Assessment for bacterial endocarditis    Procedure  Anesthesia was provided by anesthesiologist with intravenous Diprivan.  MARSHA probe could be passed without difficulty.  Patient tolerated the procedure well.  No complications were noted.    Procedure performed  Transesophageal echocardiogram  Doppler study (color continuous-wave and pulsed wave)    Results  Technically satisfactory study.  Mitral valve is structurally normal.  Tricuspid valve is normal.  Aortic valve is tricuspid and is normal.  Mild mitral aortic and tricuspid regurgitation is present.  Biatrial enlargement is present.  Left atrial appendage enlargement without clot.  Diffuse left ventricular enlargement and hypocontractility is present with ejection fraction of 25 to 30%.  No pericardial fusion or intracardiac thrombus is present.  Right atrium right ventricle enlargement is present.  Bubble study revealed PFO.  No pericardial effusion or intracardiac thrombus is seen.    Impression  Biatrial and biventricular enlargement.  Diffuse left ventricular hypocontractility with ejection fraction of 25 to 30%.  Mild smoking effect in the left atrium.  Small PFO.  Mild mitral aortic and tricuspid regurgitation is present.          STRESS MYOVIEW:    Cardiolite (Tc-99m Sestamibi) stress test    CARDIAC CATHETERIZATION:            OTHER:         Assessment & Plan     Principal Problem:    Chronic systolic (congestive) heart failure (HCC)  Active Problems:    S/P CABG (coronary artery bypass graft)    Primary hypertension    Elevated troponin    ANNETTE treated with BiPAP    Major depressive disorder, recurrent, mild (HCC)    Mixed hyperlipidemia    Flaccid hemiplegia affecting right dominant side (HCC)    Diabetic neuropathy (HCC)    Chronic obstructive pulmonary disease with (acute) exacerbation (HCC)    Anemia of renal disease    End stage renal disease (HCC)    Chronic respiratory failure with hypoxia, on home oxygen therapy (HCC)    Other specified hypothyroidism    Fluid overload    Stage 5 chronic kidney disease on chronic dialysis (HCC)    Bacteremia    Normocytic anemia due to blood loss    Essential (primary) hypertension    Cardiopulmonary arrest with successful resuscitation (formerly [facility])    Ventricular fibrillation (HCC)    Arteriovenous fistula  stenosis (HCC)      [[[[[[[[[[[[[[[[[[[[[[[[[    Impression  ==============  -Shortness of breath     - Positive blood cultures for Streptococcus 2 out of 2 sets.  Rule out tunneled catheter infection.     -Acute on chronic fluid overload.      -status post CABG 2005. status post stent to LAD 2009    Status post stent to LAD 11/13/2015  Status post stent to mid LAD and SVG to marginal branch 09/16/2016.  Status post stent to mid LAD 5/24/2021  Status post stent to SVG to RCA 5/26/2021  Status post stent to proximal LAD 5/13/2022     Cardiac catheterization 5/13/2022 revealed  Left ventricle is significantly enlarged with severe and diffuse hypocontractility with ejection fraction of 20%.  Left main coronary artery is normal.  Left anterior descending artery has proximal 90% disease.  Mid segment stents are patent.  Circumflex coronary artery is totally occluded.  Right coronary artery is totally occluded.  SVG to marginal branch is totally and chronically occluded.  SVG to RCA is patent.  Left subclavian artery is normal.  Left internal mammary artery is not a graft and is normal.    Cardiac catheterization 5/24/2021 revealed  Left ventricle angiogram was not performed due to pre-existing renal dysfunction.  Left main coronary artery normal.  Left anterior descending artery has mid segment lengthy 90% disease within the previously placed stent.  Distal left into descending artery has diffuse disease.  Circumflex coronary artery is totally occluded.  (Chronic)  Right coronary artery is chronically occluded.  SVG to marginal branch (jump graft to OM1 and OM 2) is totally occluded (new finding compared to 2015.  SVG to PDA has distal radiolucency.  However no definite obstructive disease is present.       -status post myocardial infarction 2000 and 2002 .      -history of intermittent but mostly persistent atrial fibrillation     -Acute on chronic congestive heart failure.  Compensated at this time.     Recent  echocardiogram showed left ventricle dysfunction with ejection fraction of 35%.     -History of right-sided weakness with left MCA territory stroke.-Improved    MARSHA 5/13/2022 revealed  Biatrial and biventricular enlargement.  Diffuse left ventricular hypocontractility with ejection fraction of 25 to 30%.  Mild smoking effect in the left atrium.  Small PFO.  Mild mitral aortic and tricuspid regurgitation is present.     Transesophageal echocardiogram 11/30/2020.  Biatrial enlargement.  Smoking effect in the left atrium and left ventricle and left atrial appendage.  Mild mitral and aortic regurgitation.  Left ventricle enlargement with diffuse hypocontractility with ejection fraction of 35 to 40%.     Echocardiogram 11/27/2020 revealed left atrial enlargement left ventricle dysfunction with ejection fraction of 40%.  Negative bubble study.      -diabetes hypertension and sleep apnea.  ESRD.     -status post colon surgery (partial colectomy) appendectomy cholecystectomy right 4th toe removal and carpal tunnel surgery      -continued smoking 1 pack per day -abstinence from smoking      -allergy to codeine.  ============   Plan  ================  Shortness of breath  Positive blood cultures for Streptococcus 2 out of 2 sets.  Rule out tunneled catheter infection..  MARSHA 5/13/2022-negative for vegetations.     Status post CABG  Recent CODE BLUE and ventricular fibrillation.  Patient had cardiac catheterization and stent placement to proximal LAD 5/13/2022.  Patient is not having any angina pectoris or congestive heart failure     Atrial fibrillation-chronic  Rate is better controlled  Patient is on Coreg at 12.5 mg p.o. twice a day amiodarone and Cardizem.      ESRD  Patient is undergoing dialysis.  Patient had dialysis yesterday    Hypokalemia  k 3.2-supplements.  - Improved at 4.0       Anticoagulation  Patient was on Eliquis 5 mg twice a day.  Observe for toxic effects.  Eliquis was on hold.  Have discussed with  attending physician for coordination of care.  Eliquis restarted.    Ischemic cardiomyopathy.  Recent ventricular fibrillation probably due to severe proximal left anterior descending artery disease.  Likely left ventricular dysfunction we will continue despite having recent stent placement.  Likely patient would benefit from ICD placement (single-chamber).  Patient has narrow QRS complex.  However would like to wait until the infection is cleared up.  Patient may benefit from LifeVest.  I am not sure however patient would comply with wearing LifeVest.  We discussed with infectious disease regarding timing of placement of ICD.  LifeVest was requested.  However patient apparently could not follow instructions from LifeVest instructors and they did not think he will be a suitable for LifeVest.    Current medications include amiodarone 200 mg a day aspirin atorvastatin Plavix levothyroxine metoprolol 25 mg twice a day midodrine 15 mg 3 times daily pantoprazole.    Have discussed with attending nurse for coordination of care.     Follow-up labs ordered.     Further plan will depend on patient's progress.   ]]]]]]]]]]]]]]]]]]]]]]       Jose G Rm MD  05/19/22  06:20 EDT

## 2022-05-19 NOTE — DISCHARGE SUMMARY
[facility] Medicine Services  DISCHARGE SUMMARY    Patient Name: Benson Mclean  : 1950  MRN: [id number]    Discharge condition: Guarded prognosis overall  Date of Admission: 2022  Discharge Diagnosis: Bacteremia and respiratory failure cardiomyopathy  Date of Discharge: 2022  Primary Care Physician: Rudolph Rooney MD    Presenting Problem:   Noncompliance with renal dialysis (HCC) [Z91.15]  Pleural effusion, bilateral [J90]  Fluid overload [E87.70]  Dyspnea, unspecified type [R06.00]  Stage 5 chronic kidney disease on chronic dialysis (HCC) [N18.6, Z99.2]    Active and Resolved Hospital Problems:  Active Hospital Problems    Diagnosis POA   • **Chronic systolic (congestive) heart failure ([facility]) [I50.22] Yes   • Arteriovenous fistula stenosis ([facility]) [T82.858A] Yes   • Cardiopulmonary arrest with successful resuscitation ([facility]) [I46.9] No   • Ventricular fibrillation ([facility]) [I49.01] No   • Stage 5 chronic kidney disease on chronic dialysis ([facility]) [N18.6, Z99.2] Not Applicable   • Fluid overload [E87.70] Yes   • Bacteremia [R78.81] Yes   • Normocytic anemia due to blood loss [D50.0] Yes   • Cough [R05.9] Unknown   • Essential (primary) hypertension [I10] Yes   • Other specified hypothyroidism [E03.8] Yes   • Chronic respiratory failure with hypoxia, on home oxygen therapy ([facility]) [J96.11, Z99.81] Not Applicable   • End stage renal disease ([facility]) [N18.6] Yes   • Anemia of renal disease [N18.9, D63.1] Yes   • Mixed hyperlipidemia [E78.2] Yes   • Major depressive disorder, recurrent, mild ([facility]) [F33.0] Yes   • Flaccid hemiplegia affecting right dominant side ([facility]) [G81.01] Not Applicable   • Chronic obstructive pulmonary disease with (acute) exacerbation ([facility]) [J44.1] Yes   • Elevated troponin [R77.8] Yes   • S/P CABG (coronary artery bypass graft) [Z95.1] Not Applicable   • Primary hypertension [I10] Yes   • ANNETTE treated with BiPAP [G47.33] Yes   • Diabetic neuropathy ([facility])  [E11.40] Yes      Resolved Hospital Problems   No resolved problems to display.       Hospital Course     Hospital Course:  Benson Mclean is a 71 y.o. male who initially presented to the hospital with shortness of breath and pneumonia.  He was also found to be fluid overloaded and required extra dialysis.  The patient was also growing strep and his blood and required infectious disease evaluation.  The patient was placed on IV antibiotics until May 20.  He was being followed by cardiology and nephrology while in-house.  The patient was having a need for ICD placement due to his cardiomyopathy but given the bacteremia it was determined that it would be done as an outpatient once antibiotics course is complete and once bacteremia presumably had resolved.  Once stable the patient was discharged to skilled nursing facility on May 19 in stable condition with stable vitals with plans to place ICD outpatient.  He had 1 more day of IV antibiotics left at the facility.          Day of Discharge     Vital Signs:  Temp:  [97.7 °F (36.5 °C)-98.4 °F (36.9 °C)] 98 °F (36.7 °C)  Heart Rate:  [56-74] 71  Resp:  [10-20] 11  BP: ()/(45-93) 136/64  Flow (L/min):  [2-10] 6    Physical Exam:  Physical Exam  Vitals reviewed.   Constitutional:       Comments: Wearing nasal cannula   HENT:      Head: Normocephalic.   Cardiovascular:      Rate and Rhythm: Normal rate.   Pulmonary:      Effort: Pulmonary effort is normal.   Abdominal:      General: Abdomen is flat.      Palpations: Abdomen is soft.   Musculoskeletal:         General: Normal range of motion.      Cervical back: Normal range of motion.   Skin:     General: Skin is warm.      Comments: Extensive bruising due to blood thinners   Neurological:      General: No focal deficit present.      Mental Status: He is alert. He is disoriented.   Psychiatric:         Mood and Affect: Mood normal.         Behavior: Behavior normal.            Pertinent  and/or Most Recent Results      LAB RESULTS:      Lab 05/19/22 0537 05/18/22 0330 05/17/22 0149 05/16/22  0113 05/15/22  0344 05/14/22  2216 05/14/22  0837 05/13/22  0408   WBC 5.30 5.20 4.90 5.80 5.40  --  4.80 6.70   HEMOGLOBIN 10.3* 10.3* 10.1* 10.2* 10.2*  --  10.1* 10.4*   HEMATOCRIT 33.1* 33.2* 33.3* 33.6* 33.0*  --  33.9* 34.3*   PLATELETS 203 182 195 190 188  --  185 211   NEUTROS ABS 3.70 3.00 3.23 3.80 3.90  --  3.20 4.50   LYMPHS ABS 1.10 1.50  --  1.20 0.90  --  1.00 1.30   MONOS ABS 0.40 0.50  --  0.50 0.50  --  0.50 0.70   EOS ABS 0.00 0.10 0.10 0.10 0.10  --  0.00 0.10   MCV 94.6 94.1 95.0 96.4 95.8  --  95.8 96.0   CRP 3.03* 4.03* 3.12*  --   --   --   --   --    LACTATE  --   --   --   --   --  2.0  --   --    PROTIME  --   --   --   --   --   --   --  11.5         Lab 05/19/22  0537 05/18/22  0330 05/17/22  0149 05/16/22  0113 05/15/22  0344 05/14/22  0837 05/13/22  0408   SODIUM 134* 129* 130* 133* 135* 136 133*   POTASSIUM 4.2 4.5 4.2 4.6 4.6 4.7 4.4   CHLORIDE 97* 94* 95* 97* 99 100 98   CO2 27.0 25.0 26.0 24.0 24.0 25.0 25.0   ANION GAP 10.0 10.0 9.0 12.0 12.0 11.0 10.0   BUN 11 16 11 16 13 10 16   CREATININE 2.25* 2.81* 2.38* 3.11* 2.68* 2.09* 2.90*   EGFR 30.4* 23.3* 28.4* 20.6* 24.7* 33.2* 22.4*   GLUCOSE 92 106* 111* 119* 116* 137* 97   CALCIUM 8.2* 7.9* 7.9* 8.2* 8.6 8.6 8.2*   MAGNESIUM 1.9 1.9 2.0  --   --  2.0 2.3   PHOSPHORUS 3.2 3.4 2.9 4.0 3.8 3.3 3.2         Lab 05/19/22  0537 05/18/22  0330 05/17/22  0149 05/16/22  0113 05/15/22  0344 05/14/22  0837 05/13/22  0408   TOTAL PROTEIN  --   --   --  5.8*  --  6.1 5.7*   ALBUMIN 2.70* 2.70* 2.70* 3.00* 3.10* 3.10* 2.90*   GLOBULIN  --   --   --  2.8  --  3.0 2.8   ALT (SGPT)  --   --   --  7  --  7 6   AST (SGOT)  --   --   --  9  --  20 11   BILIRUBIN  --   --   --  0.3  --  0.4 0.3   ALK PHOS  --   --   --  55  --  57 49         Lab 05/13/22  0408   PROBNP >70,000.0*   TROPONIN T 0.583*   PROTIME 11.5   INR 1.12*         Lab 05/14/22  0837   CHOLESTEROL 129    LDL CHOL 54   HDL CHOL 58   TRIGLYCERIDES 87             Brief Urine Lab Results  (Last result in the past 365 days)      Color   Clarity   Blood   Leuk Est   Nitrite   Protein   CREAT   Urine HCG        02/24/22 1043 Christine   Cloudy  Comment: Result checked    Small (1+)   Large (3+)   Positive   100 mg/dL (2+)               Microbiology Results (last 10 days)     Procedure Component Value - Date/Time    Respiratory Panel PCR w/COVID-19(SARS-CoV-2) KENAN/LIBIA/ZEYNEP/PAD/COR/MAD/MEAGHAN In-House, NP Swab in UTM/VTM, 3-4 HR TAT - Swab, Nasopharynx [[id number]]  (Normal) Collected: 05/19/22 1017    Lab Status: Final result Specimen: Swab from Nasopharynx Updated: 05/19/22 1127     ADENOVIRUS, PCR Not Detected     Coronavirus 229E Not Detected     Coronavirus HKU1 Not Detected     Coronavirus NL63 Not Detected     Coronavirus OC43 Not Detected     COVID19 Not Detected     Human Metapneumovirus Not Detected     Human Rhinovirus/Enterovirus Not Detected     Influenza A PCR Not Detected     Influenza B PCR Not Detected     Parainfluenza Virus 1 Not Detected     Parainfluenza Virus 2 Not Detected     Parainfluenza Virus 3 Not Detected     Parainfluenza Virus 4 Not Detected     RSV, PCR Not Detected     Bordetella pertussis pcr Not Detected     Bordetella parapertussis PCR Not Detected     Chlamydophila pneumoniae PCR Not Detected     Mycoplasma pneumo by PCR Not Detected    Narrative:      In the setting of a positive respiratory panel with a viral infection PLUS a negative procalcitonin without other underlying concern for bacterial infection, consider observing off antibiotics or discontinuation of antibiotics and continue supportive care. If the respiratory panel is positive for atypical bacterial infection (Bordetella pertussis, Chlamydophila pneumoniae, or Mycoplasma pneumoniae), consider antibiotic de-escalation to target atypical bacterial infection.    MRSA Screen, PCR (Inpatient) - Swab, Nares [[id number]]  (Normal)  Collected: 05/11/22 0557    Lab Status: Final result Specimen: Swab from Nares Updated: 05/11/22 0804     MRSA PCR No MRSA Detected    COVID PRE-OP / PRE-PROCEDURE SCREENING ORDER (NO ISOLATION) - Swab, Nasopharynx [[id number]]  (Normal) Collected: 05/10/22 0635    Lab Status: Final result Specimen: Swab from Nasopharynx Updated: 05/10/22 1143    Narrative:      The following orders were created for panel order COVID PRE-OP / PRE-PROCEDURE SCREENING ORDER (NO ISOLATION) - Swab, Nasopharynx.  Procedure                               Abnormality         Status                     ---------                               -----------         ------                     COVID-19,CEPHEID/ROHAN,CO...[[id number]]  Normal              Final result                 Please view results for these tests on the individual orders.    COVID-19,CEPHEID/ROHAN,COR/ZEYNEP/PAD/BEATRICE IN-HOUSE(OR EMERGENT/ADD-ON),NP SWAB IN TRANSPORT MEDIA 3-4 HR TAT, RT-PCR - Swab, Nasopharynx [[id number]]  (Normal) Collected: 05/10/22 0635    Lab Status: Final result Specimen: Swab from Nasopharynx Updated: 05/10/22 1143     COVID19 Not Detected    Narrative:      Fact sheet for providers: https://www.fda.gov/media/250130/download     Fact sheet for patients: https://www.fda.gov/media/483570/download  Fact sheet for providers: https://www.fda.gov/media/399236/download     Fact sheet for patients: https://www.fda.gov/media/141047/download          CT Chest Without Contrast Diagnostic    Result Date: 5/7/2022  Impression:  1. Moderate to large right and small-to-moderate left pleural effusions with significant passive atelectasis. Aerated lung occupies approximately 40% of the anatomic lung space. 2. Enlarged main pulmonary artery suggesting increased right heart pressure. 3. Coronary artery disease status post CABG. 5. Marked atrophy of the musculature diffusely. 6. Gynecomastia.  Electronically Signed By-Rigoberto Hill MD On:5/7/2022 5:45 PM This report was finalized  on [id number] by  Rigoberto Hill MD.    XR Chest 1 View    Result Date: 5/19/2022  Impression: Cardiomegaly with pulmonary edema and bilateral basilar areas of atelectasis.  Electronically Signed By-Davis Dee MD On:5/19/2022 9:35 AM This report was finalized on [id number] by  Davis Dee MD.    XR Chest 1 View    Result Date: 5/15/2022  Impression: 1. Persistent cardiomegaly with vascular congestion, interstitial edema and bilateral pleural effusions. The appearance is similar to the prior study. Slot 63 Electronically signed by:  Oleg Villatoro M.D.  5/14/2022 11:13 PM    XR Chest 1 View    Result Date: 5/11/2022  Impression:  1. No pneumothorax status post central venous line placement 2. Stable appearance of pulmonary edema and bilateral pleural effusions  Electronically Signed By-Norman Paige On:5/11/2022 7:39 AM This report was finalized on [id number] by  Norman Paige, .    XR Chest 1 View    Result Date: 5/11/2022  Impression: Midline sternotomy wires are present. Right dual-lumen central venous catheter is unchanged. The cardiac silhouette is moderately to significantly enlarged and there is worsening interstitial and airspace changes bilaterally which is likely related to a component of edema. There also appears to be moderate bilateral pleural effusions which are very similar to the previous examination. Superimposed infection would be difficult to exclude. Electronically signed by:  Sal Nieto D.O.  5/11/2022 12:08 AM    XR Chest 1 View    Result Date: 5/6/2022  Impression: Small right greater than left pleural effusions. Bibasilar airspace disease, right greater than left, favored to represent atelectasis.  Electronically Signed By-Demetria Jolley MD On:5/6/2022 2:16 PM This report was finalized on [id number] by  Demetria Jolley MD.      Results for orders placed during the hospital encounter of 05/03/22    Duplex hemodialysis access CAR    Interpretation Summary  · Patent left  brachiocephalic AV fistula with very low volume flow ranging 225-332 mL/min. Severe anastomotic stenosis. No outflow stenosis. Depth adequate throughout. Cephalic vein tributaries in the mid and distal arm.      Results for orders placed during the hospital encounter of 05/03/22    Duplex hemodialysis access CAR    Interpretation Summary  · Patent left brachiocephalic AV fistula with very low volume flow ranging 225-332 mL/min. Severe anastomotic stenosis. No outflow stenosis. Depth adequate throughout. Cephalic vein tributaries in the mid and distal arm.      Results for orders placed during the hospital encounter of 05/06/22    Adult Transesophageal Echo (MARSHA) W/ Cont if Necessary Per Protocol    Interpretation Summary  Date of study  5/13/2022    Indications  Bacteremia  Assessment for bacterial endocarditis    Procedure  Anesthesia was provided by anesthesiologist with intravenous Diprivan.  MARSHA probe could be passed without difficulty.  Patient tolerated the procedure well.  No complications were noted.    Procedure performed  Transesophageal echocardiogram Doppler study (color continuous-wave and pulsed wave)    Results  Technically satisfactory study.  Mitral valve is structurally normal.  Tricuspid valve is normal.  Aortic valve is tricuspid and is normal.  Mild mitral aortic and tricuspid regurgitation is present.  Biatrial enlargement is present.  Left atrial appendage enlargement without clot.  Diffuse left ventricular enlargement and hypocontractility is present with ejection fraction of 25 to 30%.  No pericardial fusion or intracardiac thrombus is present.  Right atrium right ventricle enlargement is present.  Bubble study revealed PFO.  No pericardial effusion or intracardiac thrombus is seen.    Impression  Biatrial and biventricular enlargement.  Diffuse left ventricular hypocontractility with ejection fraction of 25 to 30%.  Mild smoking effect in the left atrium.  Small PFO.  Mild mitral aortic and  tricuspid regurgitation is present.      Labs Pending at Discharge:  Pending Labs     Order Current Status    AFB Culture - Wash, Lung, R In process    Fungus Culture - Wash, Lung, R In process    Respiratory Culture - Wash, Lung, R In process    Respiratory Panel PCR w/COVID-19(SARS-CoV-2) KENAN/LIBIA/ZEYNEP/PAD/COR/MAD/MEAGHAN In-House, NP Swab in UTM/VTM, 3-4 HR TAT - Wash, Lung, R In process          Procedures Performed  Procedure(s):  BRONCHOSCOPY with left lung washing         Consults:   Consults     Date and Time Order Name Status Description    5/11/2022  1:16 PM Inpatient Palliative Care MD Consult Completed     5/11/2022 11:54 AM Inpatient Hospitalist Consult      5/9/2022  9:31 AM Inpatient Gastroenterology Consult Completed     5/9/2022  9:31 AM Inpatient Cardiology Consult Completed     5/7/2022  1:07 PM Inpatient Infectious Diseases Consult Completed     5/6/2022  2:31 PM Hospitalist (on-call MD unless specified)      5/6/2022  2:25 PM Nephrology (on -call MD unless specified)      3/30/2022  9:53 AM Inpatient Palliative Care MD Consult Completed     3/30/2022  7:38 AM Inpatient Pulmonology Consult Completed     3/30/2022 12:00 AM Inpatient Nephrology Consult Completed             Discharge Details        Discharge Medications      New Medications      Instructions Start Date   amiodarone 200 MG tablet  Commonly known as: PACERONE   200 mg, Oral, Every 24 Hours Scheduled   Start Date: May 20, 2022     aspirin 81 MG EC tablet   81 mg, Oral, Daily   Start Date: May 20, 2022     cefTRIAXone 2 g in sodium chloride 0.9 % 50 mL IVPB   2 g, Intravenous, Every 24 Hours   Start Date: May 20, 2022     clopidogrel 75 MG tablet  Commonly known as: PLAVIX   75 mg, Oral, Daily   Start Date: May 20, 2022        Changes to Medications      Instructions Start Date   metoprolol tartrate 25 MG tablet  Commonly known as: LOPRESSOR  What changed:   medication strength  how much to take  additional instructions   25 mg, Oral, 2  Times Daily      midodrine 5 MG tablet  Commonly known as: PROAMATINE  What changed:   medication strength  how much to take  additional instructions   15 mg, Oral, 3 Times Daily Before Meals         Continue These Medications      Instructions Start Date   albuterol sulfate  (90 Base) MCG/ACT inhaler  Commonly known as: PROVENTIL HFA;VENTOLIN HFA;PROAIR HFA   2 puffs, Inhalation, Every 4 Hours - RT      apixaban 5 MG tablet tablet  Commonly known as: ELIQUIS   5 mg, Oral, Every 12 Hours Scheduled      atorvastatin 40 MG tablet  Commonly known as: LIPITOR   40 mg, Oral, Nightly      bisacodyl 10 MG suppository  Commonly known as: DULCOLAX   10 mg, Rectal, Daily PRN      budesonide 0.5 MG/2ML nebulizer solution  Commonly known as: Pulmicort   0.5 mg, Nebulization, 2 Times Daily      cholecalciferol 25 MCG (1000 UT) tablet  Commonly known as: VITAMIN D3   1,000 Units, Oral, Daily      docusate sodium 100 MG capsule  Commonly known as: COLACE   100 mg, Oral, Daily      donepezil 10 MG tablet  Commonly known as: ARICEPT   10 mg, Oral, Nightly      fluticasone 50 MCG/ACT nasal spray  Commonly known as: FLONASE   2 sprays, Each Nare, 2 Times Daily      guaiFENesin 600 MG 12 hr tablet  Commonly known as: MUCINEX   600 mg, Oral, 2 Times Daily      HYDROcodone-acetaminophen 5-325 MG per tablet  Commonly known as: NORCO   1 tablet, Oral, 3 Times Daily      ipratropium-albuterol 0.5-2.5 mg/3 ml nebulizer  Commonly known as: DUO-NEB   3 mL, Nebulization, 3 Times Daily - RT      ipratropium-albuterol 0.5-2.5 mg/3 ml nebulizer  Commonly known as: DUO-NEB   3 mL, Nebulization, Every 4 Hours PRN      levothyroxine 50 MCG tablet  Commonly known as: SYNTHROID, LEVOTHROID   50 mcg, Oral, Every Early Morning      magnesium hydroxide 400 MG/5ML suspension  Commonly known as: MILK OF MAGNESIA   30 mL, Oral, Daily PRN      MiraLax 17 g packet  Generic drug: polyethylene glycol   17 g, Oral, 2 Times Daily      omeprazole 20 MG  capsule  Commonly known as: priLOSEC   20 mg, Oral, Daily      ondansetron 4 MG tablet  Commonly known as: ZOFRAN   4 mg, Oral, Every 8 Hours PRN      Preparation H 0.25-14-74.9 % ointment hemorrhoidal ointment  Generic drug: phenylephrine-mineral oil-petrolatum   1 application, Rectal, Daily PRN      sertraline 50 MG tablet  Commonly known as: ZOLOFT   50 mg, Oral, Daily      vitamin B-12 1000 MCG tablet  Commonly known as: CYANOCOBALAMIN   1,000 mcg, Oral, Daily             Allergies   Allergen Reactions   • Codeine Itching         Discharge Disposition:   Skilled Nursing Facility (SD - External)    Diet:  Hospital:  Diet Order   Procedures   • NPO Diet NPO Type: Strict NPO   • NPO Diet NPO Type: Strict NPO         Discharge Activity:         CODE STATUS:  Code Status and Medical Interventions:   Ordered at: 05/13/22 1640     Level Of Support Discussed With:    Patient     Code Status (Patient has no pulse and is not breathing):    CPR (Attempt to Resuscitate)     Medical Interventions (Patient has pulse or is breathing):    Full Support         No future appointments.        Time spent on Discharge including face to face service:  45 minutes    This patient has been examined wearing appropriate Personal Protective Equipment and discussed with Patient and staff. 05/19/22      Signature: Sunny Shannon MD

## 2022-05-19 NOTE — PROGRESS NOTES
Daily Progress Note        Chronic systolic (congestive) heart failure ([facility])    S/P CABG (coronary artery bypass graft)    Primary hypertension    Elevated troponin    ANNETTE treated with BiPAP    Major depressive disorder, recurrent, mild ([facility])    Mixed hyperlipidemia    Flaccid hemiplegia affecting right dominant side ([facility])    Diabetic neuropathy ([facility])    Chronic obstructive pulmonary disease with (acute) exacerbation ([facility])    Anemia of renal disease    End stage renal disease ([facility])    Chronic respiratory failure with hypoxia, on home oxygen therapy ([facility])    Other specified hypothyroidism    Fluid overload    Stage 5 chronic kidney disease on chronic dialysis ([facility])    Bacteremia    Normocytic anemia due to blood loss    Essential (primary) hypertension    Cardiopulmonary arrest with successful resuscitation ([facility])    Ventricular fibrillation ([facility])    Arteriovenous fistula stenosis ([facility])      Assessment    S/p Code blue was in V-fib. was defibrillated x1.  Patient was given an epi x1, bicarb x2. Upon pulse checks patient was found to be with a pulse and in normal sinus with a left bundle. Patient was moaning and turning his head. He became more and more alert.  Patient did not require intubation.       He continued to oxygenate well with nasal cannula. Within about 5 minutes the patient was asking to sit up and asking for water     Bacteremia with Streptococcus gallolyticus previously called Streptococcus bovis could be associated with GI malignancy  5/7/2020 repeat blood cultures negative    5/13/2022 MARSHA to rule out endocarditis due to Streptococcus Gallolyticus  bacteria  Biatrial and biventricular enlargement.  Diffuse left ventricular hypocontractility with ejection fraction of 25 to 30%.  Mild smoking effect in the left atrium.  Small PFO.  Mild mitral aortic and tricuspid regurgitation is present.  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,   anemia of renal disease,   vitamin deficiencies,   dementia         Recommendations:     Bronchoscopy for difficulty clearing secretions with poor cough effort    Chest x-ray from 5/15/2021 showed pulmonary edema patient received dialysis on 5/18/2022     antibiotics currently on Rocephin     BiPAP 12/5 to be used at night and as needed during the day    BP control, midodrine  Amiodarone PO  GI prophylaxis Protonix  Synthroid  Anticoagulation Eliquis    Cardiology planning ICD placement, arranging with ID regarding timing of placement             LOS: 13 days     Subjective         Objective     Vital signs for last 24 hours:  Vitals:    05/19/22 0626 05/19/22 0629 05/19/22 0630 05/19/22 0633   BP:       BP Location:       Patient Position:       Pulse: 67 66 67 66   Resp: 18 18 18 18   Temp:       TempSrc:       SpO2: 100%  100%    Weight:       Height:           Intake/Output last 3 shifts:  I/O last 3 completed shifts:  In: 350 [P.O.:300; IV Piggyback:50]  Out: 4000 [Other:4000]  Intake/Output this shift:  No intake/output data recorded.      Radiology  Imaging Results (Last 24 Hours)     ** No results found for the last 24 hours. **          Labs:  Results from last 7 days   Lab Units 05/19/22  0537   WBC 10*3/mm3 5.30   HEMOGLOBIN g/dL 10.3*   HEMATOCRIT % 33.1*   PLATELETS 10*3/mm3 203     Results from last 7 days   Lab Units 05/19/22  0537 05/17/22  0149 05/16/22  0113   SODIUM mmol/L 134*   < > 133*   POTASSIUM mmol/L 4.2   < > 4.6   CHLORIDE mmol/L 97*   < > 97*   CO2 mmol/L 27.0   < > 24.0   BUN mg/dL 11   < > 16   CREATININE mg/dL 2.25*   < > 3.11*   CALCIUM mg/dL 8.2*   < > 8.2*   BILIRUBIN mg/dL  --   --  0.3   ALK PHOS U/L  --   --  55   ALT (SGPT) U/L  --   --   7   AST (SGOT) U/L  --   --  9   GLUCOSE mg/dL 92   < > 119*    < > = values in this interval not displayed.         Results from last 7 days   Lab Units 05/19/22  0537 05/18/22  0330 05/17/22  0149   ALBUMIN g/dL 2.70* 2.70* 2.70*     Results from last 7 days   Lab Units 05/13/22  0408   TROPONIN T ng/mL 0.583*         Results from last 7 days   Lab Units 05/19/22  0537   MAGNESIUM mg/dL 1.9     Results from last 7 days   Lab Units 05/13/22  0408   INR  1.12*               Meds:   SCHEDULE  amiodarone, 200 mg, Oral, Q24H  apixaban, 5 mg, Oral, Q12H  aspirin, 81 mg, Oral, Daily  atorvastatin, 40 mg, Oral, Nightly  budesonide, 0.5 mg, Nebulization, BID  cefTRIAXone, 2 g, Intravenous, Q24H  clopidogrel, 75 mg, Oral, Daily  donepezil, 10 mg, Oral, Nightly  insulin lispro, 0-7 Units, Subcutaneous, TID AC  ipratropium-albuterol, 3 mL, Nebulization, TID - RT  levothyroxine, 50 mcg, Oral, Q AM  lidocaine, 20 mL, Intradermal, Once  metoprolol tartrate, 25 mg, Oral, BID  midodrine, 15 mg, Oral, TID AC  pantoprazole, 40 mg, Oral, QAM  polyethylene glycol, 17 g, Oral, BID  sertraline, 50 mg, Oral, Daily  sodium chloride, 10 mL, Intravenous, Q12H  sodium chloride, 10 mL, Intravenous, Q12H  sorbitol, 50 mL, Oral, Daily      Infusions     PRNs  •  acetaminophen **OR** acetaminophen **OR** acetaminophen  •  acetaminophen  •  aluminum-magnesium hydroxide-simethicone  •  atropine  •  dextrose  •  dextrose  •  glucagon (human recombinant)  •  heparin (porcine)  •  insulin lispro **AND** insulin lispro  •  ipratropium-albuterol  •  melatonin  •  ondansetron **OR** ondansetron  •  sodium chloride  •  sodium chloride  •  sodium chloride  •  sodium chloride    Physical Exam:  Physical Exam  Vitals reviewed.   Pulmonary:      Breath sounds: Decreased breath sounds present.   Musculoskeletal:         General: Swelling present.      Right lower leg: Edema present.      Left lower leg: Edema present.   Skin:     General: Skin is warm and  dry.   Neurological:      Mental Status: He is alert.         ROS  Review of Systems   Respiratory: Positive for cough and shortness of breath.    Neurological: Positive for weakness.       I reviewed the patient's new clinical results    Electronically signed by FABIOLA Bui

## 2022-05-19 NOTE — PLAN OF CARE
"Goal Outcome Evaluation:         Assessment: Benson Mclean did show improved sitting balance at eob. Pt remains confused. He presents with functional mobility impairments which indicate the need for skilled intervention. Tolerating session today without incident. Will continue to follow and progress as tolerated.     Plan/Recommendations:   Moderate Intensity Therapy recommended post-acute care. This is recommended as therapy feels the patient would require 3-4 days per week and wouldn't tolerate \"3 hour daily\" rehab intensity. SNF would be the preferred choice. If the patient does not agree to SNF, arrange HH or OP depending on home bound status. If patient is medically complex, consider LTACH.. Pt requires no DME at discharge.     Pt desires Skilled Rehab placement at discharge. Pt cooperative; agreeable to therapeutic recommendations and plan of care.         "

## 2022-05-19 NOTE — PROGRESS NOTES
Infectious Diseases Progress Note      LOS: 13 days   Patient Care Team:  Rudolph Rooney MD as PCP - General (Family Medicine)  Samantha Zabala APRN as PCP - Family Medicine  Jose G Rm MD as Consulting Physician (Cardiology)    Chief Complaint: Shortness of breath    Subjective       The patient has been afebrile for the last 24 hours.  The patient is hemodynamically stable requiring no vasopressors.  He is currently on 3 L of oxygen via nasal cannula      Review of Systems:   Review of Systems   Constitutional: Positive for fatigue.   HENT: Negative.    Eyes: Negative.    Respiratory: Positive for shortness of breath.    Cardiovascular: Negative.    Gastrointestinal: Negative.    Endocrine: Negative.    Genitourinary: Negative.    Musculoskeletal: Negative.    Skin: Negative.    Neurological: Positive for weakness.   Psychiatric/Behavioral: Negative.    All other systems reviewed and are negative.       Objective     Vital Signs  Temp:  [97.7 °F (36.5 °C)-98.4 °F (36.9 °C)] 98 °F (36.7 °C)  Heart Rate:  [56-74] 74  Resp:  [16-20] 18  BP: ()/(47-93) 115/54    Physical Exam:  Physical Exam  Vitals and nursing note reviewed.   Constitutional:       General: He is not in acute distress.     Appearance: Normal appearance. He is well-developed and normal weight. He is ill-appearing. He is not diaphoretic.   HENT:      Head: Normocephalic and atraumatic.   Eyes:      Conjunctiva/sclera: Conjunctivae normal.      Pupils: Pupils are equal, round, and reactive to light.   Cardiovascular:      Rate and Rhythm: Normal rate and regular rhythm.      Heart sounds: Normal heart sounds, S1 normal and S2 normal.   Pulmonary:      Effort: Pulmonary effort is normal. No respiratory distress.      Breath sounds: No stridor. Rales present. No wheezing.   Abdominal:      General: Bowel sounds are normal. There is no distension.      Palpations: Abdomen is soft. There is no mass.      Tenderness: There is no abdominal  tenderness. There is no guarding.   Musculoskeletal:         General: No deformity. Normal range of motion.      Cervical back: Neck supple.   Skin:     General: Skin is warm and dry.      Coloration: Skin is not pale.      Findings: No erythema or rash.   Neurological:      Mental Status: He is alert and oriented to person, place, and time.      Cranial Nerves: No cranial nerve deficit.      Comments: Generalized weakness   Psychiatric:         Mood and Affect: Mood normal.          Results Review:    I have reviewed all clinical data, test, lab, and imaging results.     Radiology  XR Chest 1 View    Result Date: 5/19/2022  Examination: XR CHEST 1 VW-  Date of Exam: 5/19/2022 8:45 AM  Indication: possible aspiration; I49.01-Ventricular fibrillation; N18.6-End stage renal disease; Z99.2-Dependence on renal dialysis; R06.00-Dyspnea, unspecified; Z91.15-Patient's noncompliance with renal dialysis; I77-Pqmekqt effusion, not elsewhere classified; R78.81-Bacteremia; D50.0-Iron deficiency anemia secondary to blood loss (chronic); I46.9-Cardiac arrest, cause unspecified; I49.01-Ventricular fibril.  Comparison: May 15, 2022  Technique: 1 view of the chest  Findings: There is a right-sided tunneled dialysis catheter with the tip in the superior vena cava. The heart is enlarged. There is pulmonary vascular congestion. There are small bilateral pleural effusions greater on the right than the left. There is bilateral basilar atelectasis.      Cardiomegaly with pulmonary edema and bilateral basilar areas of atelectasis.  Electronically Signed By-Davis Dee MD On:5/19/2022 9:35 AM This report was finalized on [id number] by  Davis Dee MD.      Cardiology    Laboratory    Results from last 7 days   Lab Units 05/19/22  0537 05/18/22  0330 05/17/22  0149 05/16/22  0113 05/15/22  0344 05/14/22  0837 05/13/22  0408   WBC 10*3/mm3 5.30 5.20 4.90 5.80 5.40 4.80 6.70   HEMOGLOBIN g/dL 10.3* 10.3* 10.1* 10.2* 10.2* 10.1* 10.4*    HEMATOCRIT % 33.1* 33.2* 33.3* 33.6* 33.0* 33.9* 34.3*   PLATELETS 10*3/mm3 203 182 195 190 188 185 211     Results from last 7 days   Lab Units 05/19/22  0537 05/18/22  0330 05/17/22  0149 05/16/22  0113 05/15/22  0344 05/14/22  0837 05/13/22  0408   SODIUM mmol/L 134* 129* 130* 133* 135* 136 133*   POTASSIUM mmol/L 4.2 4.5 4.2 4.6 4.6 4.7 4.4   CHLORIDE mmol/L 97* 94* 95* 97* 99 100 98   CO2 mmol/L 27.0 25.0 26.0 24.0 24.0 25.0 25.0   BUN mg/dL 11 16 11 16 13 10 16   CREATININE mg/dL 2.25* 2.81* 2.38* 3.11* 2.68* 2.09* 2.90*   GLUCOSE mg/dL 92 106* 111* 119* 116* 137* 97   ALBUMIN g/dL 2.70* 2.70* 2.70* 3.00* 3.10* 3.10* 2.90*   BILIRUBIN mg/dL  --   --   --  0.3  --  0.4 0.3   ALK PHOS U/L  --   --   --  55  --  57 49   AST (SGOT) U/L  --   --   --  9  --  20 11   ALT (SGPT) U/L  --   --   --  7  --  7 6   CALCIUM mg/dL 8.2* 7.9* 7.9* 8.2* 8.6 8.6 8.2*                 Microbiology   Microbiology Results (last 10 days)     Procedure Component Value - Date/Time    Respiratory Panel PCR w/COVID-19(SARS-CoV-2) KENAN/LIBIA/ZEYNEP/PAD/COR/MAD/MEAGHAN In-House, NP Swab in [facility], 3-4 HR TAT - Swab, Nasopharynx [[id number]]  (Normal) Collected: 05/19/22 1017    Lab Status: Final result Specimen: Swab from Nasopharynx Updated: 05/19/22 1127     ADENOVIRUS, PCR Not Detected     Coronavirus 229E Not Detected     Coronavirus HKU1 Not Detected     Coronavirus NL63 Not Detected     Coronavirus OC43 Not Detected     COVID19 Not Detected     Human Metapneumovirus Not Detected     Human Rhinovirus/Enterovirus Not Detected     Influenza A PCR Not Detected     Influenza B PCR Not Detected     Parainfluenza Virus 1 Not Detected     Parainfluenza Virus 2 Not Detected     Parainfluenza Virus 3 Not Detected     Parainfluenza Virus 4 Not Detected     RSV, PCR Not Detected     Bordetella pertussis pcr Not Detected     Bordetella parapertussis PCR Not Detected     Chlamydophila pneumoniae PCR Not Detected     Mycoplasma pneumo by PCR Not Detected     Narrative:      In the setting of a positive respiratory panel with a viral infection PLUS a negative procalcitonin without other underlying concern for bacterial infection, consider observing off antibiotics or discontinuation of antibiotics and continue supportive care. If the respiratory panel is positive for atypical bacterial infection (Bordetella pertussis, Chlamydophila pneumoniae, or Mycoplasma pneumoniae), consider antibiotic de-escalation to target atypical bacterial infection.    MRSA Screen, PCR (Inpatient) - Swab, Nares [[id number]]  (Normal) Collected: 05/11/22 0557    Lab Status: Final result Specimen: Swab from Nares Updated: 05/11/22 0804     MRSA PCR No MRSA Detected    COVID PRE-OP / PRE-PROCEDURE SCREENING ORDER (NO ISOLATION) - Swab, Nasopharynx [[id number]]  (Normal) Collected: 05/10/22 0635    Lab Status: Final result Specimen: Swab from Nasopharynx Updated: 05/10/22 1143    Narrative:      The following orders were created for panel order COVID PRE-OP / PRE-PROCEDURE SCREENING ORDER (NO ISOLATION) - Swab, Nasopharynx.  Procedure                               Abnormality         Status                     ---------                               -----------         ------                     COVID-19,CEPHEID/ROHAN,CO...[[id number]]  Normal              Final result                 Please view results for these tests on the individual orders.    COVID-19,CEPHEID/ROHAN,COR/ZEYNEP/PAD/BEATRICE IN-HOUSE(OR EMERGENT/ADD-ON),NP SWAB IN TRANSPORT MEDIA 3-4 HR TAT, RT-PCR - Swab, Nasopharynx [[id number]]  (Normal) Collected: 05/10/22 0635    Lab Status: Final result Specimen: Swab from Nasopharynx Updated: 05/10/22 1143     COVID19 Not Detected    Narrative:      Fact sheet for providers: https://www.fda.gov/media/071221/download     Fact sheet for patients: https://www.fda.gov/media/188341/download  Fact sheet for providers: https://www.fda.gov/media/905283/download     Fact sheet for patients:  https://www.fda.gov/media/820179/download          Medication Review:       Schedule Meds  amiodarone, 200 mg, Oral, Q24H  apixaban, 5 mg, Oral, Q12H  aspirin, 81 mg, Oral, Daily  atorvastatin, 40 mg, Oral, Nightly  budesonide, 0.5 mg, Nebulization, BID  cefTRIAXone, 2 g, Intravenous, Q24H  clopidogrel, 75 mg, Oral, Daily  donepezil, 10 mg, Oral, Nightly  ipratropium-albuterol, 3 mL, Nebulization, TID - RT  levothyroxine, 50 mcg, Oral, Q AM  lidocaine, 20 mL, Intradermal, Once  metoprolol tartrate, 25 mg, Oral, BID  midodrine, 15 mg, Oral, TID AC  pantoprazole, 40 mg, Oral, QAM  polyethylene glycol, 17 g, Oral, BID  sertraline, 50 mg, Oral, Daily  sodium chloride, 10 mL, Intravenous, Q12H  sodium chloride, 10 mL, Intravenous, Q12H  sorbitol, 50 mL, Oral, Daily        Infusion Meds       PRN Meds  •  acetaminophen **OR** acetaminophen **OR** acetaminophen  •  acetaminophen  •  aluminum-magnesium hydroxide-simethicone  •  atropine  •  dextrose  •  dextrose  •  glucagon (human recombinant)  •  heparin (porcine)  •  ipratropium-albuterol  •  melatonin  •  ondansetron **OR** ondansetron  •  sodium chloride  •  sodium chloride  •  sodium chloride  •  sodium chloride        Assessment/Plan       Antimicrobial Therapy   1.  IV ceftriaxone        2.        3.        4.        5.            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  We need to rule out endocarditis or GI malignancy  Colonoscopy was done on May 10, 2022 and found to have polyps and biopsies are pending  -MARSHA was negative for vegetation  -Blood culture from 5/7/2022 is negative     The patient presented hospital with shortness of breath and chest x-ray showed density at the right base.  Need to rule out pneumonia  -CT showed some large bilateral pleural effusions but no obvious pneumonia which most likely secondary to volume overload  -Patient is currently on 2 L of oxygen by  cannula     End-stage renal disease on hemodialysis.  Patient had right chest tunneled dialysis catheter and left arm AV fistula      History of CVA    S/p cardiac arrest with V. fib rhythm resuscitated successfully on May 11, 2022  -Patient had a heart catheterization with stent to the LAD 5/13/2020      Plan     Continue ceftriaxone 2 g IV daily for 2 weeks from the last negative blood culture on 5/7/2022-last day on 5/20/2022  Cardiology is considering placing a ICD-can be done anytime at this point  Pulmonary is considering a bronchoscopy today  Supportive care  A.m. labs    The above note was transcribed for Dr. Jack-physical exam and review of systems were performed by him    Ericka Valencia, FABIOLA  05/19/22  14:49 EDT    Note is dictated utilizing voice recognition software/Dragon

## 2022-05-19 NOTE — PLAN OF CARE
"Assessment: Benson Mclean presents with ADL impairments below baseline abilities which indicate the need for continued skilled intervention while inpatient. Flo continues to require assist x2 for bed mobility and is unsafe to attempt transfers. Max Ax2 for toileting and hygiene. He was also hallucinating at times this date, asking who the \"dude\" was in the corner where no one was present. Tolerating session today without incident. Will continue to follow and progress as tolerated.      Plan/Recommendations:   Moderate Intensity Therapy recommended post-acute care. This is recommended as therapy feels the patient would require 3-4 days per week and wouldn't tolerate \"3 hour daily\" rehab intensity. SNF would be the preferred choice. If the patient does not agree to SNF, arrange HH or OP depending on home bound status. If patient is medically complex, consider LTACH.   Pt desires Skilled Rehab placement at discharge. Pt cooperative; agreeable to therapeutic recommendations and plan of care.   "

## 2022-05-19 NOTE — PROGRESS NOTES
RENAL/KCC:    Name: Benson Mclean  Age: 71 y.o.  : 1950  Sex: male    22    Subjective  Chart reviewed    Objective:    Vital Signs  Temp:  [97.7 °F (36.5 °C)-98.4 °F (36.9 °C)] 98 °F (36.7 °C)  Heart Rate:  [56-74] 74  Resp:  [16-20] 18  BP: ()/(47-93) 115/54        Intake/Output Summary (Last 24 hours) at 2022 1312  Last data filed at 2022 1852  Gross per 24 hour   Intake --   Output 4000 ml   Net -4000 ml       Physical Exam  Physical Exam  Constitutional:       General: He is not in acute distress.     Appearance: He is not diaphoretic.   HENT:      Head: Normocephalic and atraumatic.      Nose: Nose normal.   Eyes:      Pupils: Pupils are equal, round, and reactive to light.   Neck:      Thyroid: No thyromegaly.      Vascular: No JVD.      Trachea: No tracheal deviation.   Cardiovascular:      Rate and Rhythm: Normal rate and regular rhythm.      Heart sounds: No murmur heard.    No friction rub. No gallop.   Pulmonary:      Effort: Pulmonary effort is normal. No respiratory distress.      Breath sounds: No stridor. No wheezing or rales.   Chest:      Chest wall: No tenderness.   Abdominal:      General: Bowel sounds are normal.      Palpations: Abdomen is soft. There is no mass.      Tenderness: There is no abdominal tenderness. There is no guarding or rebound.      Hernia: No hernia is present.   Musculoskeletal:         General: No tenderness or deformity. Normal range of motion.      Cervical back: Normal range of motion and neck supple.   Skin:     General: Skin is warm and dry.      Coloration: Skin is not pale.      Findings: No rash.   Neurological:      Mental Status: He is alert and oriented to person, place, and time.      Cranial Nerves: No cranial nerve deficit.      Motor: No abnormal muscle tone.      Coordination: Coordination normal.      Deep Tendon Reflexes: Reflexes are normal and symmetric. Reflexes normal.   Psychiatric:         Behavior: Behavior normal.          Thought Content: Thought content normal.         Judgment: Judgment normal.            Results Review:      Results from last 7 days   Lab Units 05/19/22 0537 05/18/22  0330 05/17/22 0149 05/16/22  0113 05/15/22  0344 05/14/22 0837 05/13/22  0408   SODIUM mmol/L 134* 129* 130* 133* 135* 136 133*   CO2 mmol/L 27.0 25.0 26.0 24.0 24.0 25.0 25.0   BUN mg/dL 11 16 11 16 13 10 16   CREATININE mg/dL 2.25* 2.81* 2.38* 3.11* 2.68* 2.09* 2.90*   CALCIUM mg/dL 8.2* 7.9* 7.9* 8.2* 8.6 8.6 8.2*   ALBUMIN g/dL 2.70* 2.70* 2.70* 3.00* 3.10* 3.10* 2.90*   AST (SGOT) U/L  --   --   --  9  --  20 11   ALT (SGPT) U/L  --   --   --  7  --  7 6   EGFR mL/min/1.73 30.4* 23.3* 28.4* 20.6* 24.7* 33.2* 22.4*       Results from last 7 days   Lab Units 05/19/22 0537 05/18/22 0330 05/17/22 0149 05/16/22  0113 05/15/22  0344 05/14/22 0837 05/13/22  0408   WBC 10*3/mm3 5.30 5.20 4.90 5.80 5.40 4.80 6.70   INR   --   --   --   --   --   --  1.12*         Medication Review:   amiodarone, 200 mg, Oral, Q24H  apixaban, 5 mg, Oral, Q12H  aspirin, 81 mg, Oral, Daily  atorvastatin, 40 mg, Oral, Nightly  budesonide, 0.5 mg, Nebulization, BID  cefTRIAXone, 2 g, Intravenous, Q24H  clopidogrel, 75 mg, Oral, Daily  donepezil, 10 mg, Oral, Nightly  insulin lispro, 0-7 Units, Subcutaneous, TID AC  ipratropium-albuterol, 3 mL, Nebulization, TID - RT  levothyroxine, 50 mcg, Oral, Q AM  lidocaine, 20 mL, Intradermal, Once  metoprolol tartrate, 25 mg, Oral, BID  midodrine, 15 mg, Oral, TID AC  pantoprazole, 40 mg, Oral, QAM  polyethylene glycol, 17 g, Oral, BID  sertraline, 50 mg, Oral, Daily  sodium chloride, 10 mL, Intravenous, Q12H  sodium chloride, 10 mL, Intravenous, Q12H  sorbitol, 50 mL, Oral, Daily          Assessment:    End-stage renal disease    Bacteremia    S/P CODE    A-fib - on Amio    SOA    CHF     History of CVA    History of coronary artery disease    Diabetes      Plan:  HD MWF as tolerated  BP support  ABX per ID - keep TDC but  will need exchanged if recurrent bacteremia  Will follow      Yony Bhat MD  05/19/22  13:12 EDT  Tel: [phone]  Fax: [phone]

## 2022-05-19 NOTE — ANESTHESIA POSTPROCEDURE EVALUATION
Patient: Benson Mclean    Procedure Summary     Date: 05/19/22 Room / Location: [facility] ENDOSCOPY 3 / [facility] ENDOSCOPY    Anesthesia Start: 1517 Anesthesia Stop: 1537    Procedure: BRONCHOSCOPY (N/A Bronchus) Diagnosis:       Cough      (Cough [R05.9])    Surgeons: Angel Prajapati MD Provider: Henry Lockhart MD    Anesthesia Type: MAC ASA Status: 4          Anesthesia Type: MAC    Vitals  No vitals data found for the desired time range.          Post Anesthesia Care and Evaluation    Patient location during evaluation: bedside  Patient participation: complete - patient participated  Level of consciousness: awake and alert  Pain score: 1  Pain management: adequate  Airway patency: patent  Anesthetic complications: No anesthetic complications  PONV Status: none  Cardiovascular status: acceptable  Respiratory status: acceptable  Hydration status: acceptable  Post Neuraxial Block status: Motor and sensory function returned to baseline

## 2022-05-19 NOTE — PLAN OF CARE
Goal Outcome Evaluation:  Plan of Care Reviewed With: patient        Progress: declining  Outcome Evaluation: pt took PM medicine with a sip of water and then started to cough and expressed he felt like he was choking. pts cough was weak and ineffective. pt had suction at bedside that is used frequently. there was no change in his vitals. his O2 remained in the 90s. nurse called intensivist NP about PRN NT suction; see orders. pt did not allow RT to NT suction him, but expressed that he would keep trying to cough on his own. Pt has not had anything by mouth since this happened at the beggining of the shift. Pt has been frequently yelling out and seeking for staff to come into his room. pt has been turned q2 hours. pt remains on 2L O2. Pt only alert to self, and very confused and forgetful.

## 2022-05-20 NOTE — CASE MANAGEMENT/SOCIAL WORK
Case Management Discharge Note      Final Note: [place]    Provided Post Acute Provider List?: N/A  Provided Post Acute Provider Quality & Resource List?: N/A    Selected Continued Care - Discharged on 5/19/2022 Admission date: 5/6/2022 - Discharge disposition: Skilled Nursing Facility (DC - External)    Destination Coordination complete.    Service Provider Selected Services Address Phone Fax Patient Preferred    [facility] [address] [facility] [place] [address] [phone] --                            Transportation Services  Ambulance: [place]    Final Discharge Disposition Code: 03 - skilled nursing facility (SNF)

## 2022-05-21 NOTE — OP NOTE
Bronchoscopy Procedure Note    Timeout was done appropriately by staff    Procedure:  1. Bronchoscopy, Diagnostic  2. Bronchoscopy, Therapeutic removal of thick mucoid secretions  3. Right lung washing    Preoperative Diagnosis: Difficulty clearing secretions with persistent cough overnight questionable aspiration    Postoperative Diagnosis:   Thick mucoid secretions noted especially in the right lung which was suctioned therapeutically  Right lung washing    Anesthesia: Moderate Sedation    Procedure Details: Patient was consented for the procedure with all risks and benefits of the procedure explained in detail.  Patient was given the opportunity to ask questions and all concerns were answered.  The bronchocope was inserted into the main airway via the oropharynx. An anatomical survey was done of the main airways and the subsegmental bronchus to at least the first subsegmental level of all five lobes of both lungs.  The findings are reported below.  A bronchoalveolar lavage was performed using aliquots of normal saline instilled into the airways then aspirated back.    Findings:  Bronchoscope passed into oral cavity to the level of the vocal cords.  Lidocaine used for local anesthetic over vocal cords.  Bronchoscope was passed between the vocal cords into the trachea.  All airways were visualized to at least the first subsegment level of all 5 lobes of both lungs.      Thick mucoid secretions noted especially in the right lung which was suctioned therapeutically  Right lung washing        Patient tolerated procedure well        Estimated Blood Loss:  Minimal           Specimens:  Sent purulent fluid                Complications:  None; patient tolerated the procedure well.           Disposition: PACU - hemodynamically stable.      Patient tolerated the procedure well.    Angel Prajapati MD  5/21/2022  14:55 EDT

## 2022-05-24 NOTE — TELEPHONE ENCOUNTER
Per hospital discharge patient needs ICD placement, also need the name of the one you are going to use, could you please place the order, or advise it there is another plan.  Patient in nursing care, [phone] (Perla)

## 2022-05-24 NOTE — TELEPHONE ENCOUNTER
No definite time has been set for ICD.  Patient recently has infection and is being treated with antibiotics.  Consider ICD when patient is cleared from ID standpoint.

## 2022-05-25 NOTE — TELEPHONE ENCOUNTER
Orenk with Perla, advised her of Dr. Rm's response. She stated PT already finished antibiotics, and needs to f/u in office per discharge summary. Made appt.

## 2022-05-31 NOTE — PROGRESS NOTES
Encounter Date:05/31/2022      Patient ID: Benson Mclean is a 71 y.o. male.    Chief Complaint:  Status post CABG  Ischemic cardiomyopathy  Status post stent  ESRD      History of Present Illness  Patient recently was admitted to [facility] for shortness of breath.  While he was in the hospital patient had ventricular fibrillation.  Subsequently patient had cardiac catheterization and stent placement to LAD.  Consideration was given for LifeVest placement however LifeVest people came he was not able to be educated about it and LifeVest was not placed.  Consideration was given for ICD placement.  Patient had streptococcal bacteremia and was treated with antibiotics.    Since I have last seen, the patient has been without any chest discomfort ,shortness of breath, palpitations, dizziness or syncope.  Denies having any headache ,abdominal pain ,nausea, vomiting , diarrhea constipation, loss of weight or loss of appetite.  Denies having any excessive bruising ,hematuria or blood in the stool.    Review of all systems negative except as indicated.    Reviewed ROS.    Assessment and Plan     [[[[[[[[[[[[[[[[[[[[[[[[[    Impression  ==============  - recent positive blood cultures for Streptococcus 2 out of 2 sets.  Patient is on antibiotics.     -Acute on chronic fluid overload.      -status post CABG 2005. status post stent to LAD 2009    Status post stent to LAD 11/13/2015  Status post stent to mid LAD and SVG to marginal branch 09/16/2016.  Status post stent to mid LAD 5/24/2021  Status post stent to SVG to RCA 5/26/2021  Status post stent to proximal LAD 5/13/2022      Cardiac catheterization 5/13/2022 revealed  Left ventricle is significantly enlarged with severe and diffuse hypocontractility with ejection fraction of 20%.  Left main coronary artery is normal.  Left anterior descending artery has proximal 90% disease.  Mid segment stents are patent.  Circumflex coronary artery is totally  occluded.  Right coronary artery is totally occluded.  SVG to marginal branch is totally and chronically occluded.  SVG to RCA is patent.  Left subclavian artery is normal.  Left internal mammary artery is not a graft and is normal.        -status post myocardial infarction 2000 and 2002 .      -history of intermittent but mostly persistent atrial fibrillation     -Acute on chronic congestive heart failure.  Compensated at this time.     Recent echocardiogram showed left ventricle dysfunction with ejection fraction of 35%.     -History of right-sided weakness with left MCA territory stroke.-Improved     MARSHA 5/13/2022 revealed  Biatrial and biventricular enlargement.  Diffuse left ventricular hypocontractility with ejection fraction of 25 to 30%.  Mild smoking effect in the left atrium.  Small PFO.  Mild mitral aortic and tricuspid regurgitation is present.     Transesophageal echocardiogram 11/30/2020.  Biatrial enlargement.  Smoking effect in the left atrium and left ventricle and left atrial appendage.  Mild mitral and aortic regurgitation.  Left ventricle enlargement with diffuse hypocontractility with ejection fraction of 35 to 40%.     Echocardiogram 11/27/2020 revealed left atrial enlargement left ventricle dysfunction with ejection fraction of 40%.  Negative bubble study.      -diabetes hypertension and sleep apnea.  ESRD.     -status post colon surgery (partial colectomy) appendectomy cholecystectomy right 4th toe removal and carpal tunnel surgery      -continued smoking 1 pack per day -abstinence from smoking      -allergy to codeine.  ============   Plan  ================  Patient recently was admitted to [facility] for shortness of breath.  While he was in the hospital patient had ventricular fibrillation.  Subsequently patient had cardiac catheterization and stent placement to LAD.  Consideration was given for LifeVest placement however LifeVest people came he was not able to be educated  about it and LifeVest was not placed.    Positive blood cultures for Streptococcus 2 out of 2 sets.  MARSHA 5/13/2022-negative for vegetations.  Patient is on antibiotics.     Status post CABG  Recent CODE BLUE and ventricular fibrillation.  Patient had cardiac catheterization and stent placement to proximal LAD 5/13/2022.  Patient is not having any angina pectoris or congestive heart failure     Atrial fibrillation-chronic  Rate is better controlled  Patient is on Coreg at 12.5 mg p.o. twice a day amiodarone and Cardizem.      ESRD  Patient is on intermittent dialysis.       Anticoagulation  Patient was on Eliquis 5 mg twice a day.  Observe for toxic effects.    Ischemic cardiomyopathy.  Recent ventricular fibrillation probably due to severe proximal left anterior descending artery disease.  Likely left ventricular dysfunction will continue despite having recent stent placement.  Likely patient would benefit from ICD placement (single-chamber).  Patient has narrow QRS complex.  However would like to wait until the infection is cleared up.  Patient may benefit from LifeVest.  (Please see the discussion above)  I am not sure however patient would comply with wearing LifeVest.  We discussed with infectious disease regarding timing of placement of ICD.  LifeVest was requested.  However patient apparently could not follow instructions from LifeVest instructors and they did not think he will be a suitable for LifeVest.     Current medications include amiodarone 200 mg a day aspirin atorvastatin Plavix levothyroxine metoprolol 25 mg twice a day midodrine 15 mg 3 times daily pantoprazole.     I had a lengthy discussion with patient and patient's brother regarding present options that included ICD placement if patient has persistent left ventricular dysfunction.  Follow-up in the office in 6 weeks with an echocardiogram.  Also had a lengthy discussion with him regarding increased risk of bleeding and infection given his  overall status.    Follow-up in the office in 6 weeks with an echocardiogram.    Further plan will depend on patient's progress.   ]]]]]]]]]]]]]]]]]]]]]]              Diagnosis Plan   1. Hx of CABG  Adult Transthoracic Echo Complete W/ Cont if Necessary Per Protocol   2. Acute on chronic renal insufficiency  Adult Transthoracic Echo Complete W/ Cont if Necessary Per Protocol   3. Diabetes mellitus due to underlying condition without complication, without long-term current use of insulin (HCC)  Adult Transthoracic Echo Complete W/ Cont if Necessary Per Protocol   4. Essential hypertension  Adult Transthoracic Echo Complete W/ Cont if Necessary Per Protocol   5. Dyslipidemia  Adult Transthoracic Echo Complete W/ Cont if Necessary Per Protocol   6. Tachycardia  Adult Transthoracic Echo Complete W/ Cont if Necessary Per Protocol   7. Cardiomyopathy, dilated (HCC)  Adult Transthoracic Echo Complete W/ Cont if Necessary Per Protocol   LAB RESULTS (LAST 7 DAYS)    CBC        BMP        CMP         BNP        TROPONIN        CoAg        Creatinine Clearance  Estimated Creatinine Clearance: 36 mL/min (A) (by C-G formula based on SCr of 2.25 mg/dL (H)).    ABG        Radiology  No radiology results for the last day                The following portions of the patient's history were reviewed and updated as appropriate: allergies, current medications, past family history, past medical history, past social history, past surgical history and problem list.    Review of Systems   Constitutional: Negative for malaise/fatigue.   Cardiovascular: Negative for chest pain, leg swelling and palpitations.   Respiratory: Negative for shortness of breath.    Gastrointestinal: Positive for nausea (OCC) and vomiting (OCC).   Neurological: Negative for dizziness, light-headedness and numbness.         Current Outpatient Medications:   •  amiodarone (PACERONE) 200 MG tablet, Take 1 tablet by mouth Daily., Disp: 30 tablet, Rfl: 0  •  apixaban  (ELIQUIS) 5 MG tablet tablet, Take 1 tablet by mouth Every 12 (Twelve) Hours. Indications: Atrial Fibrillation, Disp: 60 tablet, Rfl:   •  aspirin 81 MG EC tablet, Take 1 tablet by mouth Daily., Disp: 30 tablet, Rfl: 0  •  atorvastatin (LIPITOR) 40 MG tablet, Take 40 mg by mouth Every Night., Disp: , Rfl:   •  bisacodyl (DULCOLAX) 10 MG suppository, Insert 10 mg into the rectum Daily As Needed for Constipation., Disp: , Rfl:   •  budesonide (Pulmicort) 0.5 MG/2ML nebulizer solution, Take 2 mL by nebulization 2 (Two) Times a Day., Disp: , Rfl:   •  cholecalciferol (VITAMIN D3) 25 MCG (1000 UT) tablet, Take 1,000 Units by mouth Daily., Disp: , Rfl:   •  clopidogrel (PLAVIX) 75 MG tablet, Take 1 tablet by mouth Daily., Disp: 30 tablet, Rfl: 30  •  docusate sodium (COLACE) 100 MG capsule, Take 100 mg by mouth Daily., Disp: , Rfl:   •  donepezil (ARICEPT) 10 MG tablet, Take 10 mg by mouth Every Night., Disp: , Rfl:   •  fluticasone (FLONASE) 50 MCG/ACT nasal spray, 2 sprays by Each Nare route 2 (Two) Times a Day., Disp: , Rfl:   •  guaiFENesin (MUCINEX) 600 MG 12 hr tablet, Take 600 mg by mouth 2 (Two) Times a Day., Disp: , Rfl:   •  HYDROcodone-acetaminophen (NORCO) 5-325 MG per tablet, Take 1 tablet by mouth 3 (Three) Times a Day., Disp: , Rfl:   •  ipratropium-albuterol (DUO-NEB) 0.5-2.5 mg/3 ml nebulizer, Take 3 mL by nebulization 3 (Three) Times a Day., Disp: 360 mL, Rfl:   •  ipratropium-albuterol (DUO-NEB) 0.5-2.5 mg/3 ml nebulizer, Take 3 mL by nebulization Every 4 (Four) Hours As Needed for Wheezing., Disp: 360 mL, Rfl:   •  levothyroxine (SYNTHROID, LEVOTHROID) 50 MCG tablet, Take 50 mcg by mouth Every Morning., Disp: , Rfl:   •  magnesium hydroxide (MILK OF MAGNESIA) 400 MG/5ML suspension, Take 30 mL by mouth Daily As Needed for Constipation., Disp: , Rfl:   •  metoprolol tartrate (LOPRESSOR) 25 MG tablet, Take 1 tablet by mouth 2 (Two) Times a Day., Disp: 60 tablet, Rfl: 0  •  midodrine (PROAMATINE) 5 MG  tablet, Take 3 tablets by mouth 3 (Three) Times a Day Before Meals., Disp: 90 tablet, Rfl: 0  •  omeprazole (priLOSEC) 20 MG capsule, Take 20 mg by mouth Daily., Disp: , Rfl:   •  ondansetron (ZOFRAN) 4 MG tablet, Take 4 mg by mouth Every 8 (Eight) Hours As Needed for Nausea or Vomiting., Disp: , Rfl:   •  phenylephrine-mineral oil-petrolatum (Preparation H) 0.25-14-74.9 % ointment hemorrhoidal ointment, Insert 1 application into the rectum Daily As Needed., Disp: , Rfl:   •  polyethylene glycol (MIRALAX) 17 g packet, Take 17 g by mouth 2 (Two) Times a Day., Disp: , Rfl:   •  sertraline (ZOLOFT) 50 MG tablet, Take 50 mg by mouth Daily., Disp: , Rfl:   •  vitamin B-12 (CYANOCOBALAMIN) 1000 MCG tablet, Take 1,000 mcg by mouth Daily., Disp: , Rfl:   •  albuterol sulfate  (90 Base) MCG/ACT inhaler, Inhale 2 puffs Every 4 (Four) Hours., Disp: , Rfl:     Allergies   Allergen Reactions   • Codeine Itching       Family History   Problem Relation Age of Onset   • Heart disease Mother    • Heart disease Father    • Diabetes Sister    • Heart disease Sister    • Diabetes Brother    • Mental illness Brother        Past Surgical History:   Procedure Laterality Date   • ANGIOPLASTY      X2   • APPENDECTOMY     • ARTERIOVENOUS FISTULA/SHUNT SURGERY Left 1/20/2022    Procedure: LEFT BRACHIAL CEPHALIC ARTERIAL VENOUS FISTULA CREATION;  Surgeon: Yony Davila MD;  Location: [facility] MAIN OR;  Service: Vascular;  Laterality: Left;   • BRONCHOSCOPY N/A 5/19/2022    Procedure: BRONCHOSCOPY with left lung washing;  Surgeon: Angel Prajapati MD;  Location: [facility] ENDOSCOPY;  Service: Pulmonary;  Laterality: N/A;   • CARDIAC CATHETERIZATION  11/13/2015   • CARDIAC CATHETERIZATION N/A 5/24/2021    Procedure: Left Heart Cath and coronary angiogram;  Surgeon: Jose G Rm MD;  Location: [facility] CATH INVASIVE LOCATION;  Service: Cardiovascular;  Laterality: N/A;   • CARDIAC CATHETERIZATION  5/24/2021    Procedure: Saphenous Vein  Graft;  Surgeon: Jose G Rm MD;  Location:  [facility] CATH INVASIVE LOCATION;  Service: Cardiovascular;;   • CARDIAC CATHETERIZATION N/A 5/24/2021    Procedure: Percutaneous Coronary Intervention;  Surgeon: Bernabe Zambrano MD;  Location:  [facility] CATH INVASIVE LOCATION;  Service: Cardiology;  Laterality: N/A;   • CARDIAC CATHETERIZATION N/A 5/24/2021    Procedure: Stent NIELS coronary;  Surgeon: Bernabe Zambrano MD;  Location:  [facility] CATH INVASIVE LOCATION;  Service: Cardiology;  Laterality: N/A;   • CARDIAC CATHETERIZATION N/A 5/26/2021    Procedure: Percutaneous Coronary Intervention;  Surgeon: Bernabe Zambrano MD;  Location:  [facility] CATH INVASIVE LOCATION;  Service: Cardiovascular;  Laterality: N/A;   • CARDIAC CATHETERIZATION N/A 5/26/2021    Procedure: Stent NIELS bypass graft;  Surgeon: Bernabe Zambrano MD;  Location:  [facility] CATH INVASIVE LOCATION;  Service: Cardiovascular;  Laterality: N/A;   • CARDIAC CATHETERIZATION N/A 5/13/2022    Procedure: Left Heart Cath and coronary angiogram;  Surgeon: Jose G Rm MD;  Location:  [facility] CATH INVASIVE LOCATION;  Service: Cardiovascular;  Laterality: N/A;   • CARDIAC CATHETERIZATION  5/13/2022    Procedure: Saphenous Vein Graft;  Surgeon: Jose G Rm MD;  Location:  [facility] CATH INVASIVE LOCATION;  Service: Cardiovascular;;   • CARDIAC CATHETERIZATION N/A 5/13/2022    Procedure: Stent NIELS coronary;  Surgeon: Estefany Hyde MD;  Location:  [facility] CATH INVASIVE LOCATION;  Service: Cardiovascular;  Laterality: N/A;   • CARDIAC ELECTROPHYSIOLOGY PROCEDURE N/A 5/24/2021    Procedure: Temporary Pacemaker;  Surgeon: Bernabe Zambrano MD;  Location:  [facility] CATH INVASIVE LOCATION;  Service: Cardiology;  Laterality: N/A;   • CARDIAC ELECTROPHYSIOLOGY PROCEDURE N/A 5/26/2021    Procedure: Temporary Pacemaker;  Surgeon: Bernabe Zambrano MD;  Location:  [facility] CATH INVASIVE LOCATION;  Service: Cardiovascular;  Laterality: N/A;   • CARPAL TUNNEL RELEASE Left 04/29/2018    carpal  tunnel- lt hand// other hand surgeries    • CATARACT EXTRACTION, BILATERAL  2002    Dr. Lux Acosta   • CHOLECYSTECTOMY     • COLON RESECTION  2005   • COLONOSCOPY N/A 5/10/2022    Procedure: COLONOSCOPY with polypectomy x3;  Surgeon: Herbie Keane MD;  Location: [facility];  Service: Gastroenterology;  Laterality: N/A;  colon polyps;internal hemorrhoids   • CORONARY ANGIOPLASTY     • CORONARY ANGIOPLASTY WITH STENT PLACEMENT  11/13/2015    PTCA stent to proximal in stent and mid to distal lad   • CORONARY ANGIOPLASTY WITH STENT PLACEMENT  09/16/2016    PTCA stent to mid lad and stent to vein graft to marginal   • CORONARY ARTERY BYPASS GRAFT  2005    @ [facility] [place]   • CYST REMOVAL      cyst removed from scrotum   • FOOT SURGERY Right 07/17/2018   • FOOT SURGERY Left 02/04/2019   • PORTACATH PLACEMENT     • TOE AMPUTATION Right     right toe removed D/T infected cut that went to the bone       Past Medical History:   Diagnosis Date   • A-fib ([facility]) 8/3/2021   • Anemia    • Appetite loss    • Arthritis    • Brain bleed ([facility])    • Carpal tunnel syndrome on left    • CHF (congestive heart failure) ([facility])    • Chronic left-sided low back pain without sciatica 12/27/2017   • CKD (chronic kidney disease) stage 3, GFR 30-59 ml/min ([facility])    • Closed fracture of seventh thoracic vertebra ([facility]) 8/23/2020   • Cognitive communication deficit 12/1/2020   • COPD (chronic obstructive pulmonary disease) ([facility])    • Coronary artery disease    • COVID-19 2/19/2021   • Cytokine release syndrome, grade 1 5/24/2021   • Depression    • Diabetic neuropathy ([facility])    • Dialysis patient ([facility])     mon wed fri   • DM2 (diabetes mellitus, type 2) ([facility])    • GERD (gastroesophageal reflux disease)    • Hyperlipidemia    • Hypertension    • Hypogonadism in male    • Neuropathy    • Nonsustained ventricular tachycardia ([facility]) 7/23/2021   • Obesity    • Paroxysmal atrial fibrillation ([facility]) 12/1/2020   • Sleep apnea    • Stroke ([facility])    • Unsteady gait   "      Family History   Problem Relation Age of Onset   • Heart disease Mother    • Heart disease Father    • Diabetes Sister    • Heart disease Sister    • Diabetes Brother    • Mental illness Brother        Social History     Socioeconomic History   • Marital status:    Tobacco Use   • Smoking status: Former Smoker     Packs/day: 1.00     Years: 60.00     Pack years: 60.00     Types: Cigarettes   • Smokeless tobacco: Never Used   • Tobacco comment: Patient quit smoking 11/2020   Vaping Use   • Vaping Use: Never used   Substance and Sexual Activity   • Alcohol use: No   • Drug use: Yes     Frequency: 1.0 times per week     Types: Marijuana     Comment: socially   • Sexual activity: Defer         Procedures      Objective:       Physical Exam    /76 (BP Location: Right arm, Patient Position: Sitting, Cuff Size: Adult)   Pulse 65   Ht 177.8 cm (70\")   SpO2 99%   BMI 32.14 kg/m²   The patient is alert, oriented and in no distress.    Vital signs as noted above.    Head and neck revealed no carotid bruits or jugular venous distension.  No thyromegaly or lymphadenopathy is present.    Lungs clear.  No wheezing.  Breath sounds are normal bilaterally.    Heart normal first and second heart sounds.  No murmur..  No pericardial rub is present.  No gallop is present.    Abdomen soft and nontender.  No organomegaly is present.    Extremities revealed good peripheral pulses without any pedal edema.    Skin warm and dry.  Multiple bruising areas present.    Musculoskeletal system is grossly normal.    CNS grossly normal.    Reviewed and updated.        "

## 2022-06-29 PROBLEM — J96.11 CHRONIC RESPIRATORY FAILURE WITH HYPOXIA, ON HOME OXYGEN THERAPY: Chronic | Status: ACTIVE | Noted: 2022-01-01

## 2022-06-29 PROBLEM — I10 ESSENTIAL (PRIMARY) HYPERTENSION: Chronic | Status: ACTIVE | Noted: 2022-01-01

## 2022-06-29 PROBLEM — E83.51 HYPOCALCEMIA: Status: ACTIVE | Noted: 2022-01-01

## 2022-06-29 PROBLEM — K92.2 GASTROINTESTINAL HEMORRHAGE, UNSPECIFIED GASTROINTESTINAL HEMORRHAGE TYPE: Status: ACTIVE | Noted: 2022-01-01

## 2022-06-29 PROBLEM — Z99.81 CHRONIC RESPIRATORY FAILURE WITH HYPOXIA, ON HOME OXYGEN THERAPY (HCC): Chronic | Status: ACTIVE | Noted: 2022-01-01

## 2022-06-30 ENCOUNTER — ON CAMPUS - OUTPATIENT (AMBULATORY)
Dept: URBAN - METROPOLITAN AREA HOSPITAL 85 | Facility: HOSPITAL | Age: 72
End: 2022-06-30

## 2022-06-30 DIAGNOSIS — J44.9 CHRONIC OBSTRUCTIVE PULMONARY DISEASE, UNSPECIFIED: ICD-10-CM

## 2022-06-30 DIAGNOSIS — N18.6 END STAGE RENAL DISEASE: ICD-10-CM

## 2022-06-30 DIAGNOSIS — F03.90 UNSPECIFIED DEMENTIA, UNSPECIFIED SEVERITY, WITHOUT BEHAVIOR: ICD-10-CM

## 2022-06-30 DIAGNOSIS — D64.89 OTHER SPECIFIED ANEMIAS: ICD-10-CM

## 2022-06-30 DIAGNOSIS — R19.5 OTHER FECAL ABNORMALITIES: ICD-10-CM

## 2022-06-30 PROCEDURE — 99214 OFFICE O/P EST MOD 30 MIN: CPT | Performed by: NURSE PRACTITIONER

## 2022-07-01 NOTE — PLAN OF CARE
Goal Outcome Evaluation:                   Patient went for dialysis today, 2kg removed. Patient is confused. Patient is from [place], will return on discharge, but needs a precert. Patient was positive for a UTI, urine culture pending. Patient takes med whole.

## 2022-07-01 NOTE — PROGRESS NOTES
"RENAL/KCC:     LOS: 0 days    Patient Care Team:  Rudolph Rooney MD as PCP - General (Family Medicine)  Samantha Zabala APRN as PCP - Family Medicine  Jose G Rm MD as Consulting Physician (Cardiology)    Chief Complaint:  Bleeding from LUE wound    Subjective     Interval History:   Seen on dialysis.  Feels OK.  Has some confusion.  Denies any CP or SOA.    Objective     Vital Sign Min/Max for last 24 hours  Temp  Min: 97.9 °F (36.6 °C)  Max: 98.3 °F (36.8 °C)   BP  Min: 110/55  Max: 133/54   Pulse  Min: 56  Max: 69   Resp  Min: 17  Max: 18   SpO2  Min: 97 %  Max: 100 %   Flow (L/min)  Min: 3  Max: 3   Weight  Min: 110 kg (242 lb 4.6 oz)  Max: 110 kg (242 lb 4.6 oz)     Flowsheet Rows    Flowsheet Row First Filed Value   Admission Height 180.3 cm (71\") Documented at 06/29/2022 1209   Admission Weight 102 kg (225 lb) Documented at 06/29/2022 1209          I/O this shift:  In: -   Out: 2106 [Other:2106]  I/O last 3 completed shifts:  In: 929 [P.O.:929]  Out: 125 [Urine:125]    Physical Exam:  GEN: Awake, NAD  ENT: PERRL, EOMI, MMM  NECK: Supple, no JVD  CHEST: CTAB, no W/R/C  CV: RRR, no M/G/R  ABD: Soft, NT, +BS  SKIN: Warm and Dry  NEURO: CN's intact      WBC WBC   Date Value Ref Range Status   07/01/2022 7.90 3.40 - 10.80 10*3/mm3 Final   06/30/2022 6.30 3.40 - 10.80 10*3/mm3 Final   06/29/2022 6.50 3.40 - 10.80 10*3/mm3 Final      HGB Hemoglobin   Date Value Ref Range Status   07/01/2022 7.6 (L) 13.0 - 17.7 g/dL Final   06/30/2022 8.0 (L) 13.0 - 17.7 g/dL Final   06/30/2022 9.1 (L) 13.0 - 17.7 g/dL Final   06/29/2022 8.5 (L) 13.0 - 17.7 g/dL Final      HCT Hematocrit   Date Value Ref Range Status   07/01/2022 24.0 (L) 37.5 - 51.0 % Final   06/30/2022 27.8 (L) 37.5 - 51.0 % Final   06/29/2022 26.3 (L) 37.5 - 51.0 % Final      Platlets No results found for: LABPLAT   MCV MCV   Date Value Ref Range Status   07/01/2022 96.6 79.0 - 97.0 fL Final   06/30/2022 97.3 (H) 79.0 - 97.0 fL Final   06/29/2022 97.2 (H) " 79.0 - 97.0 fL Final          Sodium Sodium   Date Value Ref Range Status   07/01/2022 135 (L) 136 - 145 mmol/L Final   06/30/2022 136 136 - 145 mmol/L Final   06/29/2022 136 136 - 145 mmol/L Final      Potassium Potassium   Date Value Ref Range Status   07/01/2022 4.5 3.5 - 5.2 mmol/L Final   06/30/2022 4.4 3.5 - 5.2 mmol/L Final     Comment:     Slight hemolysis detected by analyzer. Results may be affected.   06/29/2022 4.1 3.5 - 5.2 mmol/L Final      Chloride Chloride   Date Value Ref Range Status   07/01/2022 96 (L) 98 - 107 mmol/L Final   06/30/2022 97 (L) 98 - 107 mmol/L Final   06/29/2022 97 (L) 98 - 107 mmol/L Final      CO2 CO2   Date Value Ref Range Status   07/01/2022 26.0 22.0 - 29.0 mmol/L Final   06/30/2022 28.0 22.0 - 29.0 mmol/L Final   06/29/2022 27.0 22.0 - 29.0 mmol/L Final      BUN BUN   Date Value Ref Range Status   07/01/2022 25 (H) 8 - 23 mg/dL Final   06/30/2022 20 8 - 23 mg/dL Final   06/29/2022 15 8 - 23 mg/dL Final      Creatinine Creatinine   Date Value Ref Range Status   07/01/2022 2.87 (H) 0.76 - 1.27 mg/dL Final   06/30/2022 2.36 (H) 0.76 - 1.27 mg/dL Final   06/29/2022 1.92 (H) 0.76 - 1.27 mg/dL Final      Calcium Calcium   Date Value Ref Range Status   07/01/2022 7.7 (L) 8.6 - 10.5 mg/dL Final   06/30/2022 7.7 (L) 8.6 - 10.5 mg/dL Final   06/29/2022 7.8 (L) 8.6 - 10.5 mg/dL Final      PO4 No results found for: CAPO4   Albumin Albumin   Date Value Ref Range Status   06/29/2022 2.40 (L) 3.50 - 5.20 g/dL Final      Magnesium Magnesium   Date Value Ref Range Status   07/01/2022 1.8 1.6 - 2.4 mg/dL Final   06/30/2022 1.7 1.6 - 2.4 mg/dL Final      Uric Acid No results found for: URICACID        Results Review:     I reviewed the patient's new clinical results.    amiodarone, 200 mg, Oral, Q24H  atorvastatin, 40 mg, Oral, Nightly  budesonide, 0.5 mg, Nebulization, BID - RT  donepezil, 10 mg, Oral, Nightly  insulin lispro, 0-7 Units, Subcutaneous, TID AC  ipratropium-albuterol, 3 mL,  Nebulization, TID - RT  levothyroxine, 50 mcg, Oral, Q AM  metoprolol tartrate, 25 mg, Oral, BID  midodrine, 15 mg, Oral, TID AC  pantoprazole, 40 mg, Intravenous, Q12H  pantoprazole, 80 mg, Intravenous, Once  sertraline, 50 mg, Oral, Daily  sodium chloride, 10 mL, Intravenous, Q12H  vitamin B-12, 1,000 mcg, Oral, Daily           Medication Review: Reviewed    Assessment & Plan       Gastrointestinal hemorrhage, unspecified gastrointestinal hemorrhage type    S/P CABG (coronary artery bypass graft)    ANNETTE treated with BiPAP    Major depressive disorder, recurrent, mild (HCC)    History of cerebrovascular accident    Diabetic neuropathy (HCC)    Obesity    Vitamin D deficiency    Longstanding persistent atrial fibrillation (HCC)    Type 2 diabetes mellitus with hyperglycemia (HCC)    Anemia of renal disease    End stage renal disease (HCC)    Chronic respiratory failure with hypoxia, on home oxygen therapy (HCC)    Vitamin B12 deficiency    Essential (primary) hypertension    Hypocalcemia    UTI    Plan: HD MWF.  Seen on dialysis this AM.  S/P EPO yesterday.  Transfuse prn for Hgb under 7.  F/U urine culture and decide on ABX based on result.  Will follow.      Yony Bhat MD   [facility]  07/01/22  10:50 EDT
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
## 2022-07-01 NOTE — NURSING NOTE
Pt's urine was positive for blood,wbcs,and bacteria. Results called into to Dr. De Guzman. No new orders given.
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
## 2022-07-01 NOTE — PLAN OF CARE
Goal Outcome Evaluation:  Plan of Care Reviewed With: patient        Progress: no change  Outcome Evaluation: 71 y.o. male with a past medical history of anemia of chronic disease, chronic respiratory failure, end-stage renal disease, hypertension, CVA, atrial fibrillation, depression, sleep apnea, CABG, and type 2 diabetes mellitus who presented to [facility] on 6/29/2022 due to LUE skin tear w/ uncontrolled bleeding.  Per ED provider patient had a fistulogram done yesterday on his left upper extremity.  He also reported to ED provider that he got a skin tear on his left upper extremity, which the ECF could not get to stop. Pt is on eliquis. He is dependent for all care at baseline & uses a kenney lift to access a wheelchair. He is painful with mvmt and expresses no wish to increase his functional (I). OT will complete orders. Pt expects to return to his ECF at d/c.

## 2022-07-01 NOTE — CASE MANAGEMENT/SOCIAL WORK
Continued Stay Note  DOREEN Brennan     Patient Name: Benson Mclean  MRN: [id number]  Today's Date: 7/1/2022    Admit Date: 6/29/2022     Discharge Plan     Row Name 07/01/22 1644       Plan    Plan DC plan: Return to [place]. Precert started 7/1, does not need approved. PASRR per facility. Current HD [facility].    Patient/Family in Agreement with Plan yes    Plan Comments CM spoke to liayuri Mares regarding PT/OT evaluations and pt needing precert to return. She confirmed that it only needed to be started, not approved, for his return. Notified her once evaluations were documented and that pt should be returning tomorrow 7/2 once urine culture finalizes.              Phone communication or documentation only - no physical contact with patient or family.      Megan Naegele, RN      Office Phone: [phone]  Office Cell: [phone]

## 2022-07-01 NOTE — PROGRESS NOTES
[facility] Medicine Services Daily Progress Note    Patient Name: Benson Mclean  : 1950  MRN: [id number]  Primary Care Physician:  Rudolph Rooney MD  Date of admission: 2022      Subjective      Chief Complaint: Bleeding from fistulogram      Subjective  Denies for any nausea vomiting, no chest pain no abdominal pain.    ROS negative as above      Objective      Vitals:   Temp:  [97.9 °F (36.6 °C)-98.5 °F (36.9 °C)] 98.5 °F (36.9 °C)  Heart Rate:  [59-87] 87  Resp:  [17-18] 18  BP: (111-133)/(52-74) 111/52  Flow (L/min):  [3] 3    Physical Exam  Constitutional:       Comments: Chronically ill-appearing   HENT:      Head: Normocephalic.      Mouth/Throat:      Mouth: Mucous membranes are moist.   Eyes:      Extraocular Movements: Extraocular movements intact.      Pupils: Pupils are equal, round, and reactive to light.   Cardiovascular:      Rate and Rhythm: Normal rate and regular rhythm.      Heart sounds: Normal heart sounds.   Pulmonary:      Effort: Pulmonary effort is normal. No respiratory distress.      Breath sounds: No rales.      Comments: HD catheter and right-sided chest wall and gauze  Abdominal:      General: Bowel sounds are normal. There is no distension.      Palpations: Abdomen is soft.      Tenderness: There is no abdominal tenderness. There is no guarding or rebound.   Musculoskeletal:      Comments: Left arm with AV fistula and dressing, no blood appreciated, no bleeding appreciated   Neurological:      General: No focal deficit present.      Mental Status: He is alert and oriented to person, place, and time.      Cranial Nerves: No cranial nerve deficit.      Motor: Weakness present.               Result Review    Result Review:  I have personally reviewed the results from the time of this admission to 2022 15:14 EDT and agree with these findings:  [x]  Laboratory  []  Microbiology  [x]  Radiology  []  EKG/Telemetry   []  Cardiology/Vascular   []   Pathology  []  Old records  []  Other:  Most notable findings include:     Wounds (last 24 hours)     LDA Wound     Row Name 07/01/22 1055 06/30/22 1901 06/30/22 1655       Wound 06/29/22 1917 Right lower leg    Wound - Properties Group Placement Date: 06/29/22  -SC Placement Time: 1917  -SC Present on Hospital Admission: Y  -SC Side: Right  -SC Orientation: lower  -SC Location: leg  -SC    Dressing Appearance dry;intact  -AK dry;intact;no drainage  -JM --    Closure OZIEL  -AK -- None  -LS    Base dressing in place, unable to visualize  -AK -- scab  -LS    Dressing Care -- dressing changed;dressing applied  -JM --    Retired Wound - Properties Group Placement Date: 06/29/22  -SC Placement Time: 1917  -SC Present on Hospital Admission: Y  -SC Side: Right  -SC Orientation: lower  -SC Location: leg  -SC    Retired Wound - Properties Group Date first assessed: 06/29/22  -SC Time first assessed: 1917 -SC Present on Hospital Admission: Y  -SC Side: Right  -SC Location: leg  -SC       Wound 03/30/22 0340 perineum Pressure Injury    Wound - Properties Group Placement Date: 03/30/22  -AS Placement Time: 0340  -AS Present on Hospital Admission: Y  -AS Location: perineum  -AS Primary Wound Type: Pressure inj  -AS    Dressing Appearance open to air  -AK open to air  -JM --    Closure Open to air  -AK -- Open to air  -LS    Base pink;moist  -AK -- pink;moist  -LS    Periwound -- -- pink  -LS    Periwound Temperature -- -- warm  -LS    Periwound Skin Turgor -- -- soft  -LS    Edges -- -- open  -LS    Drainage Amount -- -- none  -LS    Retired Wound - Properties Group Placement Date: 03/30/22  -AS Placement Time: 0340  -AS Present on Hospital Admission: Y  -AS Location: perineum  -AS Primary Wound Type: Pressure inj  -AS    Retired Wound - Properties Group Date first assessed: 03/30/22  -AS Time first assessed: 0340  -AS Present on Hospital Admission: Y  -AS Location: perineum  -AS Primary Wound Type: Pressure inj  -AS       Wound  05/06/22 1706 Right heel Pressure Injury    Wound - Properties Group Placement Date: 05/06/22 -JP Placement Time: 1706 -JP Present on Hospital Admission: Y  -MAGNO Side: Right  -MAGNO Location: heel  -MAGNO Primary Wound Type: Pressure inj  -BREANNE    Dressing Appearance open to air  -AK open to air  -JM --    Closure Open to air  -AK -- Open to air  -LS    Base non-blanchable;pink  -AK -- pink  -LS    Periwound -- -- pink;blanchable  -LS    Periwound Temperature -- -- cool  -LS    Periwound Skin Turgor -- -- soft  -LS    Drainage Amount -- -- none  -LS    Retired Wound - Properties Group Placement Date: 05/06/22 -JP Placement Time: 1706 -JP Present on Hospital Admission: Y  -MAGNO Side: Right  -MAGNO Location: heel  -MAGNO Primary Wound Type: Pressure inj  -BREANNE    Retired Wound - Properties Group Date first assessed: 05/06/22 -JP Time first assessed: 1706 -JP Present on Hospital Admission: Y  -MAGNO Side: Right  -MAGNO Location: heel  -MAGNO Primary Wound Type: Pressure inj  -BREANNE          User Key  (r) = Recorded By, (t) = Taken By, (c) = Cosigned By    Initials Name Provider Type    Shahana Webb, RN Registered Nurse    Zainab Griffiths RN Registered Nurse    Zainab Hopson RN Registered Nurse    Puja Schofield RN Registered Nurse    Zainab Manuel RN Registered Nurse    Shanice Trinidad RN Registered Nurse    Maricruz Jiménez RN Registered Nurse                  Assessment & Plan      Brief Patient Summary:  Benson Mclean is a 71 y.o. male who is admitted secondary to bleeding new fistulogram.      amiodarone, 200 mg, Oral, Q24H  atorvastatin, 40 mg, Oral, Nightly  budesonide, 0.5 mg, Nebulization, BID - RT  donepezil, 10 mg, Oral, Nightly  insulin lispro, 0-7 Units, Subcutaneous, TID AC  ipratropium-albuterol, 3 mL, Nebulization, TID - RT  levothyroxine, 50 mcg, Oral, Q AM  metoprolol tartrate, 25 mg, Oral, BID  midodrine, 15 mg, Oral, TID AC  pantoprazole, 40 mg, Intravenous, Q12H  pantoprazole, 80 mg,  Intravenous, Once  sertraline, 50 mg, Oral, Daily  sodium chloride, 10 mL, Intravenous, Q12H  vitamin B-12, 1,000 mcg, Oral, Daily             Active Hospital Problems:  Active Hospital Problems    Diagnosis    • **Gastrointestinal hemorrhage, unspecified gastrointestinal hemorrhage type    • Hypocalcemia    • Essential (primary) hypertension    • Vitamin B12 deficiency    • Chronic respiratory failure with hypoxia, on home oxygen therapy ([facility])    • End stage renal disease ([facility])    • Anemia of renal disease    • Type 2 diabetes mellitus with hyperglycemia ([facility])    • Obesity    • History of cerebrovascular accident    • Longstanding persistent atrial fibrillation ([facility])    • Major depressive disorder, recurrent, mild ([facility])    • S/P CABG (coronary artery bypass graft)    • Vitamin D deficiency    • ANNETTE treated with BiPAP    • Diabetic neuropathy ([facility])      Plan:       Recent fistulogram  - Vascular surgery consulted.  Awaited input     Essential HTN  - Controlled  - Monitor blood pressure  - Continue metoprolol      Chronic CAD S/P CABG  - Continue aspirin,statin, and metoprolol      Atrial fibrillation  - Continuous cardiac monitoring  - EKG ordered  - Continue amiodarone   - Holding home eliquis due to GI bleed     ESRD  - CR 1.9, monitor  - Continue midodrine   - HD as per nephro     Type II DM  - Start sliding scale, Accuchecks ACHS  - Check hbg A1C  - No reported home medications     Depression  - Stable  - Continue zoloft      Hypothyroidism  - Continue levothyroxine      Dementia  - Currently alert and oriented X2 with confusion noted   - Continue aricept      GERD  - IV PPI ordered      History of CVA  - Residual right sided weakness  - Continue statin, holding home eliquis due to GI bleed      Sleep apnea  Chronic respiratory failure  - Patient wears 3L NC at home     Obesity  - BMI 31.3  - Encourage lifestyle changes     DVT prophylaxis:  Mechanical DVT prophylaxis orders are present.    CODE STATUS:    Level  Of Support Discussed With: Patient  Code Status (Patient has no pulse and is not breathing): CPR (Attempt to Resuscitate)  Medical Interventions (Patient has pulse or is breathing): Full Support      Disposition:  I expect patient to be discharged         Electronically signed by Elzbieta Vásquez MD, 07/01/22, 15:14 EDT.  [facility]

## 2022-07-01 NOTE — PLAN OF CARE
Goal Outcome Evaluation:  Plan of Care Reviewed With: patient           Outcome Evaluation: 70 yo male adm 6/29/22 for acute LUE skin tear, for which bleeding could not be stopped. Was admitted and vascular was consulted to stop bleeding. Pt has hx of multiple cva's, esrd/hd, chronic respiratory failure (on home O2), htn, a fib, cabg, DM2. At baseline, pt is a resident of an ecf and is dependent for all mobility, including bed mobility and transfers. He gets up to a w/c only w/ use of a kenney lift. Today, pt is alert and oriented x 3, but not to time. He requires max assist of 2 to come to sitting at eob, and when there, he needs max assist to maintain his balance. This patient is well known to this therapist, and he is definitely at his baseline level for functional mobility at this time. Not appropriate for skilled PT. Recommend return to ecf at d/c. Will sign off.

## 2022-07-02 NOTE — PROGRESS NOTES
"RENAL/KCC:     LOS: 0 days    Patient Care Team:  Rudolph Rooney MD as PCP - General (Family Medicine)  Samantha Zabala APRN as PCP - Family Medicine  Jose G Rm MD as Consulting Physician (Cardiology)    Chief Complaint:  Bleeding from LUE wound    Subjective     Interval History:   Chart reviewed.  Feels OK.  Denies any CP or SOA.    Objective     Vital Sign Min/Max for last 24 hours  Temp  Min: 97.9 °F (36.6 °C)  Max: 99.1 °F (37.3 °C)   BP  Min: 106/66  Max: 133/54   Pulse  Min: 50  Max: 87   Resp  Min: 16  Max: 18   SpO2  Min: 98 %  Max: 100 %   Flow (L/min)  Min: 3  Max: 3   Weight  Min: 108 kg (238 lb 15.7 oz)  Max: 108 kg (238 lb 15.7 oz)     Flowsheet Rows    Flowsheet Row First Filed Value   Admission Height 180.3 cm (71\") Documented at 06/29/2022 1209   Admission Weight 102 kg (225 lb) Documented at 06/29/2022 1209          I/O this shift:  In: 240 [P.O.:240]  Out: -   I/O last 3 completed shifts:  In: 1223 [P.O.:1223]  Out: 2231 [Urine:125; Other:2106]    Physical Exam:  GEN: Awake, NAD  ENT: PERRL, EOMI, MMM  NECK: Supple, no JVD  CHEST: CTAB, no W/R/C  CV: RRR, no M/G/R  ABD: Soft, NT, +BS  SKIN: Warm and Dry  NEURO: CN's intact      WBC WBC   Date Value Ref Range Status   07/02/2022 6.30 3.40 - 10.80 10*3/mm3 Final   07/01/2022 7.90 3.40 - 10.80 10*3/mm3 Final   06/30/2022 6.30 3.40 - 10.80 10*3/mm3 Final   06/29/2022 6.50 3.40 - 10.80 10*3/mm3 Final      HGB Hemoglobin   Date Value Ref Range Status   07/02/2022 7.5 (L) 13.0 - 17.7 g/dL Final   07/01/2022 7.6 (L) 13.0 - 17.7 g/dL Final   06/30/2022 8.0 (L) 13.0 - 17.7 g/dL Final   06/30/2022 9.1 (L) 13.0 - 17.7 g/dL Final   06/29/2022 8.5 (L) 13.0 - 17.7 g/dL Final      HCT Hematocrit   Date Value Ref Range Status   07/02/2022 23.3 (L) 37.5 - 51.0 % Final   07/01/2022 24.0 (L) 37.5 - 51.0 % Final   06/30/2022 27.8 (L) 37.5 - 51.0 % Final   06/29/2022 26.3 (L) 37.5 - 51.0 % Final      Platlets No results found for: LABPLAT   MCV MCV   Date " Value Ref Range Status   07/02/2022 97.1 (H) 79.0 - 97.0 fL Final   07/01/2022 96.6 79.0 - 97.0 fL Final   06/30/2022 97.3 (H) 79.0 - 97.0 fL Final   06/29/2022 97.2 (H) 79.0 - 97.0 fL Final          Sodium Sodium   Date Value Ref Range Status   07/01/2022 135 (L) 136 - 145 mmol/L Final   06/30/2022 136 136 - 145 mmol/L Final   06/29/2022 136 136 - 145 mmol/L Final      Potassium Potassium   Date Value Ref Range Status   07/01/2022 4.5 3.5 - 5.2 mmol/L Final   06/30/2022 4.4 3.5 - 5.2 mmol/L Final     Comment:     Slight hemolysis detected by analyzer. Results may be affected.   06/29/2022 4.1 3.5 - 5.2 mmol/L Final      Chloride Chloride   Date Value Ref Range Status   07/01/2022 96 (L) 98 - 107 mmol/L Final   06/30/2022 97 (L) 98 - 107 mmol/L Final   06/29/2022 97 (L) 98 - 107 mmol/L Final      CO2 CO2   Date Value Ref Range Status   07/01/2022 26.0 22.0 - 29.0 mmol/L Final   06/30/2022 28.0 22.0 - 29.0 mmol/L Final   06/29/2022 27.0 22.0 - 29.0 mmol/L Final      BUN BUN   Date Value Ref Range Status   07/01/2022 25 (H) 8 - 23 mg/dL Final   06/30/2022 20 8 - 23 mg/dL Final   06/29/2022 15 8 - 23 mg/dL Final      Creatinine Creatinine   Date Value Ref Range Status   07/01/2022 2.87 (H) 0.76 - 1.27 mg/dL Final   06/30/2022 2.36 (H) 0.76 - 1.27 mg/dL Final   06/29/2022 1.92 (H) 0.76 - 1.27 mg/dL Final      Calcium Calcium   Date Value Ref Range Status   07/01/2022 7.7 (L) 8.6 - 10.5 mg/dL Final   06/30/2022 7.7 (L) 8.6 - 10.5 mg/dL Final   06/29/2022 7.8 (L) 8.6 - 10.5 mg/dL Final      PO4 No results found for: CAPO4   Albumin Albumin   Date Value Ref Range Status   06/29/2022 2.40 (L) 3.50 - 5.20 g/dL Final      Magnesium Magnesium   Date Value Ref Range Status   07/01/2022 1.8 1.6 - 2.4 mg/dL Final   06/30/2022 1.7 1.6 - 2.4 mg/dL Final      Uric Acid No results found for: URICACID        Results Review:     I reviewed the patient's new clinical results.    amiodarone, 200 mg, Oral, Q24H  atorvastatin, 40 mg, Oral,  Nightly  budesonide, 0.5 mg, Nebulization, BID - RT  donepezil, 10 mg, Oral, Nightly  insulin lispro, 0-7 Units, Subcutaneous, TID AC  ipratropium-albuterol, 3 mL, Nebulization, TID - RT  levothyroxine, 50 mcg, Oral, Q AM  metoprolol tartrate, 25 mg, Oral, BID  midodrine, 15 mg, Oral, TID AC  pantoprazole, 40 mg, Intravenous, Q12H  pantoprazole, 80 mg, Intravenous, Once  sertraline, 50 mg, Oral, Daily  sodium chloride, 10 mL, Intravenous, Q12H  vitamin B-12, 1,000 mcg, Oral, Daily           Medication Review: Reviewed    Assessment & Plan       Gastrointestinal hemorrhage, unspecified gastrointestinal hemorrhage type    S/P CABG (coronary artery bypass graft)    ANNETTE treated with BiPAP    Major depressive disorder, recurrent, mild (HCC)    History of cerebrovascular accident    Diabetic neuropathy (HCC)    Obesity    Vitamin D deficiency    Longstanding persistent atrial fibrillation (HCC)    Type 2 diabetes mellitus with hyperglycemia (HCC)    Anemia of renal disease    End stage renal disease (HCC)    Chronic respiratory failure with hypoxia, on home oxygen therapy (HCC)    Vitamin B12 deficiency    Essential (primary) hypertension    Hypocalcemia    UTI    Hypotension on Midodrine    Plan: HD MWF.  EPO with HD.  Transfuse prn for Hgb under 7.  F/U urine culture.  Dispo per primary.  Will follow.      Yony Bhat MD   [facility]  07/02/22  05:52 EDT

## 2022-07-02 NOTE — PROGRESS NOTES
[facility] Medicine Services Daily Progress Note    Patient Name: Benson Mclean  : 1950  MRN: [id number]  Primary Care Physician:  Rudolph Rooney MD  Date of admission: 2022      Subjective      Chief Complaint: Bleeding from fistulogram      Subjective  Denies any new complaint, no nausea no vomiting.    ROS negative as above      Objective      Vitals:   Temp:  [97.9 °F (36.6 °C)-99.1 °F (37.3 °C)] 98.3 °F (36.8 °C)  Heart Rate:  [50-69] 60  Resp:  [16-21] 21  BP: (105-124)/(55-74) 123/56  Flow (L/min):  [3] 3    Physical Exam  Constitutional:       Comments: Chronically ill-appearing   HENT:      Head: Normocephalic.      Mouth/Throat:      Mouth: Mucous membranes are moist.   Eyes:      Extraocular Movements: Extraocular movements intact.      Pupils: Pupils are equal, round, and reactive to light.   Cardiovascular:      Rate and Rhythm: Normal rate and regular rhythm.      Heart sounds: Normal heart sounds.   Pulmonary:      Effort: Pulmonary effort is normal. No respiratory distress.      Breath sounds: No rales.      Comments: HD catheter and right-sided chest wall and gauze  Abdominal:      General: Bowel sounds are normal. There is no distension.      Palpations: Abdomen is soft.      Tenderness: There is no abdominal tenderness. There is no guarding or rebound.   Musculoskeletal:      Comments: Left arm with AV fistula and dressing, no blood appreciated, no bleeding appreciated   Neurological:      General: No focal deficit present.      Mental Status: He is alert and oriented to person, place, and time.      Cranial Nerves: No cranial nerve deficit.      Motor: Weakness present.               Result Review    Result Review:  I have personally reviewed the results from the time of this admission to 2022 13:00 EDT and agree with these findings:  [x]  Laboratory  []  Microbiology  [x]  Radiology  []  EKG/Telemetry   []  Cardiology/Vascular   []  Pathology  []   Old records  []  Other:  Most notable findings include:     Wounds (last 24 hours)     LDA Wound     Row Name 07/02/22 0839 07/01/22 2000          Wound 06/29/22 1917 Right lower leg    Wound - Properties Group Placement Date: 06/29/22  -SC Placement Time: 1917  -SC Present on Hospital Admission: Y  -SC Side: Right  -SC Orientation: lower  -SC Location: leg  -SC     Dressing Appearance dry;intact;no drainage  -MD dry;intact  -     Closure Adhesive bandage  -MD --     Base black eschar  -MD --     Periwound dry;intact  -MD --     Drainage Amount none  -MD --     Care, Wound cleansed with;sterile normal saline  -MD --     Dressing Care dressing changed  -MD --     Retired Wound - Properties Group Placement Date: 06/29/22  -SC Placement Time: 1917  -SC Present on Hospital Admission: Y  -SC Side: Right  -SC Orientation: lower  -SC Location: leg  -SC     Retired Wound - Properties Group Date first assessed: 06/29/22  -SC Time first assessed: 1917  -SC Present on Hospital Admission: Y  -SC Side: Right  -SC Location: leg  -SC            Wound 03/30/22 0340 perineum Pressure Injury    Wound - Properties Group Placement Date: 03/30/22  -AS Placement Time: 0340  -AS Present on Hospital Admission: Y  -AS Location: perineum  -AS Primary Wound Type: Pressure inj  -AS     Dressing Appearance -- open to air  -JM     Retired Wound - Properties Group Placement Date: 03/30/22  -AS Placement Time: 0340  -AS Present on Hospital Admission: Y  -AS Location: perineum  -AS Primary Wound Type: Pressure inj  -AS     Retired Wound - Properties Group Date first assessed: 03/30/22  -AS Time first assessed: 0340  -AS Present on Hospital Admission: Y  -AS Location: perineum  -AS Primary Wound Type: Pressure inj  -AS            Wound 05/06/22 1706 Right heel Pressure Injury    Wound - Properties Group Placement Date: 05/06/22  -MAGNO Placement Time: 1706  -MAGNO Present on Hospital Admission: Y  -MAGNO Side: Right  -MAGNO Location: heel  -MAGNO Primary Wound  Type: Pressure inj  -BREANNE     Dressing Appearance open to air  -MD open to air  -JM     Closure Open to air  -MD --     Base non-blanchable;pink  -MD --     Retired Wound - Properties Group Placement Date: 05/06/22 -JP Placement Time: 1706 -JP Present on Hospital Admission: Y  -MAGNO Side: Right  -MAGNO Location: heel  -MAGNO Primary Wound Type: Pressure inj  -BREANNE     Retired Wound - Properties Group Date first assessed: 05/06/22 -JP Time first assessed: 1706 -JP Present on Hospital Admission: Y  -MAGNO Side: Right  -MAGNO Location: heel  -MAGNO Primary Wound Type: Pressure inj  -BREANNE           User Key  (r) = Recorded By, (t) = Taken By, (c) = Cosigned By    Initials Name Provider Type    Kavya Pastrana, ANTELMON Licensed Nurse    Shahana Webb, RN Registered Nurse    Zainab Griffiths RN Registered Nurse    Zainab Hopson RN Registered Nurse    Zainab Manuel RN Registered Nurse    AS Maricruz Mi RN Registered Nurse                  Assessment & Plan      Brief Patient Summary:  Benson Mclean is a 71 y.o. male who is admitted secondary to bleeding new fistulogram.      amiodarone, 200 mg, Oral, Q24H  atorvastatin, 40 mg, Oral, Nightly  budesonide, 0.5 mg, Nebulization, BID - RT  donepezil, 10 mg, Oral, Nightly  insulin lispro, 0-7 Units, Subcutaneous, TID AC  ipratropium-albuterol, 3 mL, Nebulization, TID - RT  levothyroxine, 50 mcg, Oral, Q AM  metoprolol tartrate, 25 mg, Oral, BID  midodrine, 15 mg, Oral, TID AC  pantoprazole, 40 mg, Intravenous, Q12H  pantoprazole, 80 mg, Intravenous, Once  sertraline, 50 mg, Oral, Daily  sodium chloride, 10 mL, Intravenous, Q12H  vitamin B-12, 1,000 mcg, Oral, Daily             Active Hospital Problems:  Active Hospital Problems    Diagnosis    • **Gastrointestinal hemorrhage, unspecified gastrointestinal hemorrhage type    • Hypocalcemia    • Essential (primary) hypertension    • Vitamin B12 deficiency    • Chronic respiratory failure with hypoxia, on home oxygen  therapy (HCC)    • End stage renal disease (HCC)    • Anemia of renal disease    • Type 2 diabetes mellitus with hyperglycemia (HCC)    • Obesity    • History of cerebrovascular accident    • Longstanding persistent atrial fibrillation (HCC)    • Major depressive disorder, recurrent, mild (HCC)    • S/P CABG (coronary artery bypass graft)    • Vitamin D deficiency    • ANNETTE treated with BiPAP    • Diabetic neuropathy (HCC)      Plan:       Recent fistulogram  - Vascular surgery consulted.  Awaited input     Essential HTN  - Controlled  - Monitor blood pressure  - Continue metoprolol      Chronic CAD S/P CABG  - Continue aspirin,statin, and metoprolol      Atrial fibrillation  - Continuous cardiac monitoring  - EKG ordered  - Continue amiodarone   - Holding home eliquis due to GI bleed     ESRD  - CR 1.9, monitor  - Continue midodrine   - HD as per nephro     Type II DM  - Start sliding scale, Accuchecks ACHS  - Check hbg A1C  - No reported home medications     Depression  - Stable  - Continue zoloft      Hypothyroidism  - Continue levothyroxine      Dementia  - Currently alert and oriented X2 with confusion noted   - Continue aricept      GERD  - IV PPI ordered      History of CVA  - Residual right sided weakness  - Continue statin, holding home eliquis due to GI bleed      Sleep apnea  Chronic respiratory failure  - Patient wears 3L NC at home     Obesity  - BMI 31.3  - Encourage lifestyle changes     Physical deconditioning, PT OT to see, anticipate discharge tomorrow after PT recommendation.    DVT prophylaxis:  Mechanical DVT prophylaxis orders are present.    CODE STATUS:    Level Of Support Discussed With: Patient  Code Status (Patient has no pulse and is not breathing): CPR (Attempt to Resuscitate)  Medical Interventions (Patient has pulse or is breathing): Full Support      Disposition:  I expect patient to be discharged         Electronically signed by Elzbieta Vásquez MD, 07/02/22, 13:00 EDT.  [facility]

## 2022-07-02 NOTE — PLAN OF CARE
Goal Outcome Evaluation:  Patient is pleasant. No complaints. Was instructed on how to use call light several times throughout the day from numerous staff rather than yelling out. Dressing changes were completed. Q2 turn. Will continue to monitor.

## 2022-07-03 NOTE — PLAN OF CARE
Goal Outcome Evaluation:  Plan of Care Reviewed With: patient        Progress: no change   Alert and oriented x 3. Able to verbalize needs and wants. Takes medication whole and tolerates well. Has suction at bedside and is able to perform oral suction independently. Requires total assistance for bed mobility. Has not ambulated this shift. No s/s of hyper/hypoglycemia noted. Continues to require O2 therapy at 3 LPM via NC and tolerating well. Received dose of IV Rocephin, no s/s of adverse/allergic reaction noted. Currently in bed, awake. Call bell in reach. Despite being able to utilize call bell, patient continues to yell out for staff attention. Education given and understood and to importance of using call bell and respecting/using consideration for the other patients.

## 2022-07-03 NOTE — PROGRESS NOTES
[facility] Medicine Services Daily Progress Note    Patient Name: Benson Mclean  : 1950  MRN: [id number]  Primary Care Physician:  Rudolph Rooney MD  Date of admission: 2022      Subjective      Chief Complaint: Bleeding from fistulogram      Subjective  Denies for any new complaint, no nausea or vomiting.     ROS negative as above      Objective      Vitals:   Temp:  [98.1 °F (36.7 °C)-98.8 °F (37.1 °C)] 98.2 °F (36.8 °C)  Heart Rate:  [54-68] 59  Resp:  [17-20] 19  BP: (113-131)/(56-74) 122/56  Flow (L/min):  [2-3] 2    Physical Exam  Constitutional:       Comments: Chronically ill-appearing   HENT:      Head: Normocephalic.      Mouth/Throat:      Mouth: Mucous membranes are moist.   Eyes:      Extraocular Movements: Extraocular movements intact.      Pupils: Pupils are equal, round, and reactive to light.   Cardiovascular:      Rate and Rhythm: Normal rate and regular rhythm.      Heart sounds: Normal heart sounds.   Pulmonary:      Effort: Pulmonary effort is normal. No respiratory distress.      Breath sounds: No rales.      Comments: HD catheter and right-sided chest wall and gauze  Abdominal:      General: Bowel sounds are normal. There is no distension.      Palpations: Abdomen is soft.      Tenderness: There is no abdominal tenderness. There is no guarding or rebound.   Musculoskeletal:      Comments: Left arm with AV fistula and dressing, no blood appreciated, no bleeding appreciated   Neurological:      General: No focal deficit present.      Mental Status: He is alert and oriented to person, place, and time.      Cranial Nerves: No cranial nerve deficit.      Motor: Weakness present.               Result Review    Result Review:  I have personally reviewed the results from the time of this admission to 7/3/2022 12:44 EDT and agree with these findings:  [x]  Laboratory  []  Microbiology  [x]  Radiology  []  EKG/Telemetry   []  Cardiology/Vascular   []   Pathology  []  Old records  []  Other:  Most notable findings include:     Wounds (last 24 hours)     LDA Wound     Row Name 07/03/22 1132 07/02/22 2000          [REMOVED] Wound 06/29/22 1917 Right lower leg    Wound - Properties Group Placement Date: 06/29/22  -SC Placement Time: 1917 -SC Present on Hospital Admission: Y  -SC Side: Right  -SC Orientation: lower  -SC Location: leg  -SC Removal Date: 07/03/22  -AH Removal Time: 1132 -AH Wound Outcome: Healed  -AH     Dressing Appearance dry;intact  -AH dry;intact  -JM     Closure Adhesive bandage  -AH --     Base scab  -AH --     Periwound dry;intact  -AH --     Periwound Temperature warm  -AH --     Periwound Skin Turgor firm  -AH --     Edges rolled/closed  -AH --     Drainage Characteristics/Odor bleeding controlled  -AH --     Drainage Amount none  -AH --     Care, Wound cleansed with;soap and water  - --     Dressing Care dressing changed  -AH --     Retired Wound - Properties Group Placement Date: 06/29/22  -SC Placement Time: 1917  -SC Present on Hospital Admission: Y  -SC Side: Right  -SC Orientation: lower  -SC Location: leg  -SC Removal Date: 07/03/22  -AH Removal Time: 1132 -AH Wound Outcome: Healed  -AH     Retired Wound - Properties Group Date first assessed: 06/29/22  -SC Time first assessed: 1917  -SC Present on Hospital Admission: Y  -SC Side: Right  -SC Location: leg  -SC Resolution Date: 07/03/22  -AH Resolution Time: 1132 -AH Wound Outcome: Healed  -AH            Wound 03/30/22 0340 perineum Pressure Injury    Wound - Properties Group Placement Date: 03/30/22  -AS Placement Time: 0340  -AS Present on Hospital Admission: Y  -AS Location: perineum  -AS Primary Wound Type: Pressure inj  -AS     Pressure Injury Stage U  -AH --     Dressing Appearance dry;intact  -AH open to air  -JM     Closure Adhesive bandage  -AH --     Base epithelialization;slough  -AH --     Yellow (%), Wound Tissue Color 30  -AH --     Periwound pink  -AH --     Periwound  Temperature warm  -AH --     Periwound Skin Turgor soft  -AH --     Edges open  -AH --     Drainage Characteristics/Odor bleeding controlled  -AH --     Drainage Amount none  -AH --     Care, Wound cleansed with;soap and water  -AH --     Dressing Care dressing applied  -AH --     Retired Wound - Properties Group Placement Date: 03/30/22  -AS Placement Time: 0340  -AS Present on Hospital Admission: Y  -AS Location: perineum  -AS Primary Wound Type: Pressure inj  -AS     Retired Wound - Properties Group Date first assessed: 03/30/22  -AS Time first assessed: 0340  -AS Present on Hospital Admission: Y  -AS Location: perineum  -AS Primary Wound Type: Pressure inj  -AS            Wound 05/06/22 1706 Right heel Pressure Injury    Wound - Properties Group Placement Date: 05/06/22  -MAGNO Placement Time: 1706 -JP Present on Hospital Admission: Y  -MAGNO Side: Right  -MAGNO Location: heel  -MAGNO Primary Wound Type: Pressure inj  -BREANNE     Pressure Injury Stage 1  -AH --     Dressing Appearance open to air  -AH open to air  -JM     Closure Open to air  -AH --     Base non-blanchable;pink  -AH --     Periwound pink;blanchable  -AH --     Periwound Temperature cool  -AH --     Periwound Skin Turgor soft  -AH --     Edges rolled/closed  -AH --     Drainage Characteristics/Odor bleeding controlled  -AH --     Drainage Amount none  -AH --     Care, Wound cleansed with;soap and water  -AH --     Dressing Care dressing applied  -AH --     Retired Wound - Properties Group Placement Date: 05/06/22  -MAGNO Placement Time: 1706 -JP Present on Hospital Admission: Y  -MAGNO Side: Right  -MAGNO Location: heel  -MAGNO Primary Wound Type: Pressure inj  -BREANNE     Retired Wound - Properties Group Date first assessed: 05/06/22  -MAGNO Time first assessed: 1706 -JP Present on Hospital Admission: Y  -MAGNO Side: Right  -MAGON Location: heel  -MAGNO Primary Wound Type: Pressure inj  -BREANNE           User Key  (r) = Recorded By, (t) = Taken By, (c) = Cosigned By    Initials Name  Provider Type    Shahana Webb, RN Registered Nurse    Zainab Griffiths RN Registered Nurse    Zainab Hopson RN Registered Nurse    Zainab Manuel RN Registered Nurse    Shanice Keyes LPN Licensed Nurse    AS Maricruz Mi RN Registered Nurse                  Assessment & Plan      Brief Patient Summary:  Benson Mclean is a 71 y.o. male who is admitted secondary to bleeding new fistulogram.      amiodarone, 200 mg, Oral, Q24H  atorvastatin, 40 mg, Oral, Nightly  budesonide, 0.5 mg, Nebulization, BID - RT  cefTRIAXone, 1 g, Intravenous, Daily  donepezil, 10 mg, Oral, Nightly  [START ON 7/4/2022] Epoetin Jerrell-epbx, 20,000 Units, Intravenous, Once per day on Mon Wed Fri  insulin lispro, 0-7 Units, Subcutaneous, TID AC  ipratropium-albuterol, 3 mL, Nebulization, TID - RT  levothyroxine, 50 mcg, Oral, Q AM  metoprolol tartrate, 25 mg, Oral, BID  midodrine, 15 mg, Oral, TID AC  pantoprazole, 40 mg, Oral, BID AC  sertraline, 50 mg, Oral, Daily  sodium chloride, 10 mL, Intravenous, Q12H  vitamin B-12, 1,000 mcg, Oral, Daily             Active Hospital Problems:  Active Hospital Problems    Diagnosis    • **Gastrointestinal hemorrhage, unspecified gastrointestinal hemorrhage type    • Hypocalcemia    • Essential (primary) hypertension    • Vitamin B12 deficiency    • Chronic respiratory failure with hypoxia, on home oxygen therapy ([facility])    • End stage renal disease ([facility])    • Anemia of renal disease    • Type 2 diabetes mellitus with hyperglycemia ([facility])    • Obesity    • History of cerebrovascular accident    • Longstanding persistent atrial fibrillation ([facility])    • Major depressive disorder, recurrent, mild ([facility])    • S/P CABG (coronary artery bypass graft)    • Vitamin D deficiency    • ANNETTE treated with BiPAP    • Diabetic neuropathy ([facility])      Plan:       Recent fistulogram  - Vascular surgery follow up outpatient.    ESBL positive culture likely colonization, no fever, no leucocytosis,  will not subject to treatment.     Essential HTN  - Controlled  - Monitor blood pressure  - Continue metoprolol      Chronic CAD S/P CABG  - Continue aspirin,statin, and metoprolol      Atrial fibrillation  - Continuous cardiac monitoring  - EKG ordered  - Continue amiodarone   - Holding home eliquis due to GI bleed     ESRD  - CR 1.9, monitor  - Continue midodrine   - HD as per nephro     Type II DM  - Start sliding scale, Accuchecks ACHS  - Check hbg A1C  - No reported home medications     Depression  - Stable  - Continue zoloft      Hypothyroidism  - Continue levothyroxine      Dementia  - Currently alert and oriented X2 with confusion noted   - Continue aricept      GERD  - IV PPI ordered      History of CVA  - Residual right sided weakness  - Continue statin, holding home eliquis due to GI bleed      Sleep apnea  Chronic respiratory failure  - Patient wears 3L NC at home     Obesity  - BMI 31.3  - Encourage lifestyle changes     Physical deconditioning, PT OT to see, anticipate discharge tomorrow after PT recommendation.  Discharge to rehab once arrangement are done.    DVT prophylaxis:  Mechanical DVT prophylaxis orders are present.    CODE STATUS:    Level Of Support Discussed With: Patient  Code Status (Patient has no pulse and is not breathing): CPR (Attempt to Resuscitate)  Medical Interventions (Patient has pulse or is breathing): Full Support      Disposition:  I expect patient to be discharged         Electronically signed by Elzbieta Vásquez MD, 07/03/22, 12:44 EDT.  [facility] Team

## 2022-07-03 NOTE — PROGRESS NOTES
"RENAL/KCC:     LOS: 0 days    Patient Care Team:  Rudolph Rooney MD as PCP - General (Family Medicine)  Samantha Zabala APRN as PCP - Family Medicine  Jose G Rm MD as Consulting Physician (Cardiology)    Chief Complaint:  Bleeding from LUE wound    Subjective     Interval History:   Chart reviewed.  Feels OK.  Denies any CP or SOA.    Objective     Vital Sign Min/Max for last 24 hours  Temp  Min: 98.1 °F (36.7 °C)  Max: 98.7 °F (37.1 °C)   BP  Min: 105/61  Max: 131/65   Pulse  Min: 57  Max: 69   Resp  Min: 17  Max: 21   SpO2  Min: 96 %  Max: 100 %   Flow (L/min)  Min: 3  Max: 3   Weight  Min: 108 kg (237 lb 10.5 oz)  Max: 108 kg (237 lb 10.5 oz)     Flowsheet Rows    Flowsheet Row First Filed Value   Admission Height 180.3 cm (71\") Documented at 06/29/2022 1209   Admission Weight 102 kg (225 lb) Documented at 06/29/2022 1209          I/O this shift:  In: 120 [P.O.:120]  Out: -   I/O last 3 completed shifts:  In: 1004 [P.O.:1004]  Out: 2131 [Urine:25; Other:2106]    Physical Exam:  GEN: Awake, NAD  ENT: PERRL, EOMI, MMM  NECK: Supple, no JVD  CHEST: CTAB, no W/R/C  CV: RRR, no M/G/R  ABD: Soft, NT, +BS  SKIN: Warm and Dry  NEURO: CN's intact      WBC WBC   Date Value Ref Range Status   07/02/2022 6.30 3.40 - 10.80 10*3/mm3 Final   07/01/2022 7.90 3.40 - 10.80 10*3/mm3 Final   06/30/2022 6.30 3.40 - 10.80 10*3/mm3 Final      HGB Hemoglobin   Date Value Ref Range Status   07/02/2022 7.5 (L) 13.0 - 17.7 g/dL Final   07/01/2022 7.6 (L) 13.0 - 17.7 g/dL Final   06/30/2022 8.0 (L) 13.0 - 17.7 g/dL Final   06/30/2022 9.1 (L) 13.0 - 17.7 g/dL Final      HCT Hematocrit   Date Value Ref Range Status   07/02/2022 23.3 (L) 37.5 - 51.0 % Final   07/01/2022 24.0 (L) 37.5 - 51.0 % Final   06/30/2022 27.8 (L) 37.5 - 51.0 % Final      Platlets No results found for: LABPLAT   MCV MCV   Date Value Ref Range Status   07/02/2022 97.1 (H) 79.0 - 97.0 fL Final   07/01/2022 96.6 79.0 - 97.0 fL Final   06/30/2022 97.3 (H) 79.0 - " 97.0 fL Final          Sodium Sodium   Date Value Ref Range Status   07/02/2022 132 (L) 136 - 145 mmol/L Final   07/01/2022 135 (L) 136 - 145 mmol/L Final   06/30/2022 136 136 - 145 mmol/L Final      Potassium Potassium   Date Value Ref Range Status   07/02/2022 4.4 3.5 - 5.2 mmol/L Final     Comment:     Slight hemolysis detected by analyzer. Results may be affected.   07/01/2022 4.5 3.5 - 5.2 mmol/L Final   06/30/2022 4.4 3.5 - 5.2 mmol/L Final     Comment:     Slight hemolysis detected by analyzer. Results may be affected.      Chloride Chloride   Date Value Ref Range Status   07/02/2022 95 (L) 98 - 107 mmol/L Final   07/01/2022 96 (L) 98 - 107 mmol/L Final   06/30/2022 97 (L) 98 - 107 mmol/L Final      CO2 CO2   Date Value Ref Range Status   07/02/2022 25.0 22.0 - 29.0 mmol/L Final   07/01/2022 26.0 22.0 - 29.0 mmol/L Final   06/30/2022 28.0 22.0 - 29.0 mmol/L Final      BUN BUN   Date Value Ref Range Status   07/02/2022 18 8 - 23 mg/dL Final   07/01/2022 25 (H) 8 - 23 mg/dL Final   06/30/2022 20 8 - 23 mg/dL Final      Creatinine Creatinine   Date Value Ref Range Status   07/02/2022 2.18 (H) 0.76 - 1.27 mg/dL Final   07/01/2022 2.87 (H) 0.76 - 1.27 mg/dL Final   06/30/2022 2.36 (H) 0.76 - 1.27 mg/dL Final      Calcium Calcium   Date Value Ref Range Status   07/02/2022 7.5 (L) 8.6 - 10.5 mg/dL Final   07/01/2022 7.7 (L) 8.6 - 10.5 mg/dL Final   06/30/2022 7.7 (L) 8.6 - 10.5 mg/dL Final      PO4 No results found for: CAPO4   Albumin No results found for: ALBUMIN   Magnesium Magnesium   Date Value Ref Range Status   07/02/2022 1.7 1.6 - 2.4 mg/dL Final   07/01/2022 1.8 1.6 - 2.4 mg/dL Final   06/30/2022 1.7 1.6 - 2.4 mg/dL Final      Uric Acid No results found for: URICACID        Results Review:     I reviewed the patient's new clinical results.    amiodarone, 200 mg, Oral, Q24H  atorvastatin, 40 mg, Oral, Nightly  budesonide, 0.5 mg, Nebulization, BID - RT  cefTRIAXone, 1 g, Intravenous, Q24H  donepezil, 10 mg,  Oral, Nightly  [START ON 7/4/2022] Epoetin Jerrell-epbx, 20,000 Units, Intravenous, Once per day on Mon Wed Fri  insulin lispro, 0-7 Units, Subcutaneous, TID AC  ipratropium-albuterol, 3 mL, Nebulization, TID - RT  levothyroxine, 50 mcg, Oral, Q AM  metoprolol tartrate, 25 mg, Oral, BID  midodrine, 15 mg, Oral, TID AC  pantoprazole, 40 mg, Oral, BID AC  sertraline, 50 mg, Oral, Daily  sodium chloride, 10 mL, Intravenous, Q12H  vitamin B-12, 1,000 mcg, Oral, Daily           Medication Review: Reviewed    Assessment & Plan       Gastrointestinal hemorrhage, unspecified gastrointestinal hemorrhage type    S/P CABG (coronary artery bypass graft)    ANNETTE treated with BiPAP    Major depressive disorder, recurrent, mild (HCC)    History of cerebrovascular accident    Diabetic neuropathy (HCC)    Obesity    Vitamin D deficiency    Longstanding persistent atrial fibrillation (HCC)    Type 2 diabetes mellitus with hyperglycemia (HCC)    Anemia of renal disease    End stage renal disease (HCC)    Chronic respiratory failure with hypoxia, on home oxygen therapy (HCC)    Vitamin B12 deficiency    Essential (primary) hypertension    Hypocalcemia    UTI    Hypotension on Midodrine    Plan: HD MWF.  EPO with HD.  Transfuse prn for Hgb under 7.  Add Rocephin and F/U urine culture.  Dispo per primary.  Will follow.      Yony Bhat MD   [facility]  07/03/22  06:25 EDT

## 2022-07-03 NOTE — PLAN OF CARE
Goal Outcome Evaluation:      Pt complaining of being cold all night.Pt is afebrile. Pt also not using call light and yelling out.

## 2022-07-04 NOTE — PLAN OF CARE
Goal Outcome Evaluation:  Plan of Care Reviewed With: patient        Progress: no change   Alert and oriented x 4. Able to verbalize needs and wants. Takes medication whole and tolerates well. Went to dialysis this shift, tolerated well. Incontinent of bowel and bladder. Total for bed mobility. Total for feeding. Continues to require O2 therapy at 3 LPM via NC and tolerating well. No new c/o pain/discomfort/SOB noted. No s/s of hyper/hypoglycemia noted. Currently in bed, awake. Call bell in reach.

## 2022-07-04 NOTE — PROGRESS NOTES
"RENAL/KCC:     LOS: 0 days    Patient Care Team:  Rudolph Rooney MD as PCP - General (Family Medicine)  Samantha Zabala APRN as PCP - Family Medicine  Jose G Rm MD as Consulting Physician (Cardiology)    Chief Complaint:  Bleeding from LUE wound    Subjective     Interval History:   Chart reviewed.  Feels OK.  Denies any CP or SOA.    Objective     Vital Sign Min/Max for last 24 hours  Temp  Min: 98.2 °F (36.8 °C)  Max: 99.1 °F (37.3 °C)   BP  Min: 111/67  Max: 122/56   Pulse  Min: 55  Max: 69   Resp  Min: 16  Max: 20   SpO2  Min: 98 %  Max: 100 %   Flow (L/min)  Min: 2  Max: 3   Weight  Min: 103 kg (228 lb 2.1 oz)  Max: 103 kg (228 lb 2.1 oz)     Flowsheet Rows    Flowsheet Row First Filed Value   Admission Height 180.3 cm (71\") Documented at 06/29/2022 1209   Admission Weight 102 kg (225 lb) Documented at 06/29/2022 1209          No intake/output data recorded.  I/O last 3 completed shifts:  In: 780 [P.O.:780]  Out: -     Physical Exam:  GEN: Awake, NAD  ENT: PERRL, EOMI, MMM  NECK: Supple, no JVD  CHEST: CTAB, no W/R/C  CV: RRR, no M/G/R  ABD: Soft, NT, +BS  SKIN: Warm and Dry  NEURO: CN's intact      WBC WBC   Date Value Ref Range Status   07/04/2022 6.70 3.40 - 10.80 10*3/mm3 Final   07/03/2022 6.40 3.40 - 10.80 10*3/mm3 Final   07/02/2022 6.30 3.40 - 10.80 10*3/mm3 Final      HGB Hemoglobin   Date Value Ref Range Status   07/04/2022 7.6 (L) 13.0 - 17.7 g/dL Final   07/03/2022 7.6 (L) 13.0 - 17.7 g/dL Final   07/02/2022 7.5 (L) 13.0 - 17.7 g/dL Final      HCT Hematocrit   Date Value Ref Range Status   07/04/2022 23.7 (L) 37.5 - 51.0 % Final   07/03/2022 23.8 (L) 37.5 - 51.0 % Final   07/02/2022 23.3 (L) 37.5 - 51.0 % Final      Platlets No results found for: LABPLAT   MCV MCV   Date Value Ref Range Status   07/04/2022 94.8 79.0 - 97.0 fL Final   07/03/2022 96.6 79.0 - 97.0 fL Final   07/02/2022 97.1 (H) 79.0 - 97.0 fL Final          Sodium Sodium   Date Value Ref Range Status   07/04/2022 126 (L) 136 - " 145 mmol/L Final   07/03/2022 132 (L) 136 - 145 mmol/L Final   07/02/2022 132 (L) 136 - 145 mmol/L Final      Potassium Potassium   Date Value Ref Range Status   07/04/2022 4.8 3.5 - 5.2 mmol/L Final     Comment:     Slight hemolysis detected by analyzer. Results may be affected.   07/03/2022 4.7 3.5 - 5.2 mmol/L Final   07/02/2022 4.4 3.5 - 5.2 mmol/L Final     Comment:     Slight hemolysis detected by analyzer. Results may be affected.      Chloride Chloride   Date Value Ref Range Status   07/04/2022 92 (L) 98 - 107 mmol/L Final   07/03/2022 95 (L) 98 - 107 mmol/L Final   07/02/2022 95 (L) 98 - 107 mmol/L Final      CO2 CO2   Date Value Ref Range Status   07/04/2022 21.0 (L) 22.0 - 29.0 mmol/L Final   07/03/2022 26.0 22.0 - 29.0 mmol/L Final   07/02/2022 25.0 22.0 - 29.0 mmol/L Final      BUN BUN   Date Value Ref Range Status   07/04/2022 30 (H) 8 - 23 mg/dL Final   07/03/2022 25 (H) 8 - 23 mg/dL Final   07/02/2022 18 8 - 23 mg/dL Final      Creatinine Creatinine   Date Value Ref Range Status   07/04/2022 3.13 (H) 0.76 - 1.27 mg/dL Final   07/03/2022 2.94 (H) 0.76 - 1.27 mg/dL Final   07/02/2022 2.18 (H) 0.76 - 1.27 mg/dL Final      Calcium Calcium   Date Value Ref Range Status   07/04/2022 7.6 (L) 8.6 - 10.5 mg/dL Final   07/03/2022 7.7 (L) 8.6 - 10.5 mg/dL Final   07/02/2022 7.5 (L) 8.6 - 10.5 mg/dL Final      PO4 No results found for: CAPO4   Albumin No results found for: ALBUMIN   Magnesium Magnesium   Date Value Ref Range Status   07/04/2022 1.9 1.6 - 2.4 mg/dL Final   07/03/2022 2.1 1.6 - 2.4 mg/dL Final   07/02/2022 1.7 1.6 - 2.4 mg/dL Final      Uric Acid No results found for: URICACID        Results Review:     I reviewed the patient's new clinical results.    amiodarone, 200 mg, Oral, Q24H  atorvastatin, 40 mg, Oral, Nightly  budesonide, 0.5 mg, Nebulization, BID - RT  donepezil, 10 mg, Oral, Nightly  Epoetin Jerrell-epbx, 20,000 Units, Intravenous, Once per day on Mon Wed Fri  insulin lispro, 0-7 Units,  Subcutaneous, TID AC  ipratropium-albuterol, 3 mL, Nebulization, TID - RT  levothyroxine, 50 mcg, Oral, Q AM  metoprolol tartrate, 25 mg, Oral, BID  midodrine, 15 mg, Oral, TID AC  pantoprazole, 40 mg, Oral, BID AC  sertraline, 50 mg, Oral, Daily  sodium chloride, 10 mL, Intravenous, Q12H  vitamin B-12, 1,000 mcg, Oral, Daily           Medication Review: Reviewed    Assessment & Plan       Gastrointestinal hemorrhage, unspecified gastrointestinal hemorrhage type    S/P CABG (coronary artery bypass graft)    ANNETTE treated with BiPAP    Major depressive disorder, recurrent, mild (HCC)    History of cerebrovascular accident    Diabetic neuropathy (HCC)    Obesity    Vitamin D deficiency    Longstanding persistent atrial fibrillation (HCC)    Type 2 diabetes mellitus with hyperglycemia (HCC)    Anemia of renal disease    End stage renal disease (HCC)    Chronic respiratory failure with hypoxia, on home oxygen therapy (HCC)    Vitamin B12 deficiency    Essential (primary) hypertension    Hypocalcemia    UTI    Hypotension on Midodrine    Plan: HD MWF.  EPO with HD.  Hgb stable at 7.6. Transfuse prn for Hgb under 7.  Urine culture finalized as ESBL e. Coli - likely colonization - off ABX.  Dispo per primary.  Will follow.      Yony Bhat MD   [facility]  07/04/22  07:26 EDT

## 2022-07-04 NOTE — DISCHARGE SUMMARY
[facility]  DISCHARGE SUMMARY    Patient Name: Benson Mclean  : 1950  MRN: [id number]    Date of Admission: 2022  Date of Discharge: 2022  Primary Care Physician: Rudolph Rooney MD      Presenting Problem:   Skin avulsion [T14.8XXA]  Gastrointestinal hemorrhage, unspecified gastrointestinal hemorrhage type [K92.2]    Active and Resolved Hospital Problems:  Active Hospital Problems    Diagnosis POA   • **Chronic respiratory failure with hypoxia, on home oxygen therapy (HCC) [J96.11, Z99.81] Not Applicable   • Gastrointestinal hemorrhage, unspecified gastrointestinal hemorrhage type [K92.2] Yes   • Hypocalcemia [E83.51] Yes   • Essential (primary) hypertension [I10] Yes   • Vitamin B12 deficiency [E53.8] Yes   • End stage renal disease (HCC) [N18.6] Yes   • Anemia of renal disease [N18.9, D63.1] Yes   • Type 2 diabetes mellitus with hyperglycemia (HCC) [E11.65] Yes   • Obesity [E66.9] Yes   • History of cerebrovascular accident [Z86.73] Not Applicable   • Longstanding persistent atrial fibrillation ([facility]) [I48.11] Yes   • Major depressive disorder, recurrent, mild ([facility]) [F33.0] Yes   • S/P CABG (coronary artery bypass graft) [Z95.1] Not Applicable   • Vitamin D deficiency [E55.9] Yes   • ANNETTE treated with BiPAP [G47.33] Yes   • Diabetic neuropathy (HCC) [E11.40] Yes      Resolved Hospital Problems   No resolved problems to display.         Hospital Course     Hospital Course:  Benson Mclean is a 71 y.o. male with a past medical history of anemia of chronic disease, chronic respiratory failure, end-stage renal disease, hypertension, CVA, atrial fibrillation, depression, sleep apnea, CABG, and type 2 diabetes mellitus who presented to [facility] on 2022 due to bleeding.  Per ED provider patient had a fistulogram done on his left upper extremity.  He also reported to ED provider that he got a skin tear on his left upper extremity,  which the ECF could not get to stop bleeding so they sent him to the ED.  Patient denies any complaints at this time.  It is noted patient is on Eliquis for atrial fibrillation, but unsure of when patient had last dose.  Anticoagulation held on admission, nephrology consulted for dialysis.  No further bleeding noted.  GI also saw the patient and did not recommend any further endoscopic work-up.  UA noted to be active but likely colonization as urine culture with ESBL E. coli noted.  No antibiotics needed, patient remains afebrile without leukocytosis.  Okay to discharge per GI and nephrology.  Patient stable to discharge back to rehab with follow-up with PCP as an outpatient.      DISCHARGE Follow Up Recommendations for labs and diagnostics:   Follow-up with PCP    Reasons For Change In Medications and Indications for New Medications:  No significant medication changes    Day of Discharge     Vital Signs:  Temp:  [97.7 °F (36.5 °C)-99.1 °F (37.3 °C)] 97.7 °F (36.5 °C)  Heart Rate:  [55-69] 64  Resp:  [16-20] 17  BP: (111-130)/(53-70) 120/53  Flow (L/min):  [2-3] 3    Physical Exam:  General: Awake, alert, elderly male, lying in bed, NAD  Eyes: PERRL, EOMI, conjunctive are clear  Cardiovascular: Regular rate and rhythm, no murmurs  Respiratory: Clear to auscultation bilaterally, no wheezing or rales, unlabored breathing  Abdomen: Soft, nontender, positive bowel sounds, no guarding  Neurologic: A&O, CN grossly intact, moves all extremities spontaneously  Musculoskeletal: Generalized weakness noted, no deformities  Skin: Warm, dry, intact        Pertinent  and/or Most Recent Results     LAB RESULTS:      Lab 07/04/22  0256 07/03/22  0605 07/02/22  0444 07/01/22  0143 06/30/22  1233 06/30/22  0653 06/29/22  1320   WBC 6.70 6.40 6.30 7.90  --  6.30 6.50   HEMOGLOBIN 7.6* 7.6* 7.5* 7.6* 8.0* 9.1* 8.5*   HEMATOCRIT 23.7* 23.8* 23.3* 24.0*  --  27.8* 26.3*   PLATELETS 211 262 252 282  --  309 330   NEUTROS ABS 3.89 3.80  3.70 5.20  --  4.60 3.80   LYMPHS ABS  --  1.70 1.80 1.90  --  1.20 1.90   MONOS ABS  --  0.60 0.60 0.60  --  0.50 0.60   EOS ABS  --  0.10 0.10 0.10  --  0.10 0.10   MCV 94.8 96.6 97.1* 96.6  --  97.3* 97.2*   PROTIME  --   --   --   --   --   --  11.8*   APTT  --   --   --   --   --   --  30.9*         Lab 07/04/22  0256 07/03/22  0605 07/02/22  0444 07/01/22  0143 06/30/22  1233 06/30/22  0653   SODIUM 126* 132* 132* 135* 136  --    POTASSIUM 4.8 4.7 4.4 4.5 4.4  --    CHLORIDE 92* 95* 95* 96* 97*  --    CO2 21.0* 26.0 25.0 26.0 28.0  --    ANION GAP 13.0 11.0 12.0 13.0 11.0  --    BUN 30* 25* 18 25* 20  --    CREATININE 3.13* 2.94* 2.18* 2.87* 2.36*  --    EGFR 20.5* 22.1* 31.6* 22.7* 28.7*  --    GLUCOSE 83 110* 93 80 136*  --    CALCIUM 7.6* 7.7* 7.5* 7.7* 7.7*  --    IONIZED CALCIUM  --   --   --   --   --  1.14*   MAGNESIUM 1.9 2.1 1.7 1.8 1.7  --          Lab 06/29/22  1320   TOTAL PROTEIN 5.5*   ALBUMIN 2.40*   GLOBULIN 3.1   ALT (SGPT) 5   AST (SGOT) 10   BILIRUBIN 0.3   ALK PHOS 58         Lab 06/29/22  1320   PROTIME 11.8*   INR 1.16*             Lab 06/29/22  1419   ABO TYPING A   RH TYPING Positive   ANTIBODY SCREEN Negative         Brief Urine Lab Results  (Last result in the past 365 days)      Color   Clarity   Blood   Leuk Est   Nitrite   Protein   CREAT   Urine HCG        07/01/22 0126 --  Comment: Brown   Turbid   Large (3+)   Large (3+)   Positive   100 mg/dL (2+)               Microbiology Results (last 10 days)     Procedure Component Value - Date/Time    Urine Culture - Urine, Urine, Clean Catch [[id number]]  (Abnormal)  (Susceptibility) Collected: 07/01/22 0126    Lab Status: Final result Specimen: Urine, Clean Catch Updated: 07/03/22 1234     Urine Culture >100,000 CFU/mL Escherichia coli ESBL     Comment: Consider infectious disease consult.  Susceptibility results may not correlate to clinical outcomes.       Narrative:      Colonization of the urinary tract without infection is common.  Treatment is discouraged unless the patient is symptomatic, pregnant, or undergoing an invasive urologic procedure.    Susceptibility      Escherichia coli ESBL      MONROE      Amikacin Susceptible      Ertapenem Susceptible      Gentamicin Resistant     Levofloxacin Resistant     Meropenem Susceptible      Nitrofurantoin Intermediate      Piperacillin + Tazobactam Susceptible      Tobramycin Resistant     Trimethoprim + Sulfamethoxazole Resistant                          COVID PRE-OP / PRE-PROCEDURE SCREENING ORDER (NO ISOLATION) - Swab, Nasopharynx [[id number]]  (Normal) Collected: 06/29/22 1424    Lab Status: Final result Specimen: Swab from Nasopharynx Updated: 06/29/22 1454    Narrative:      The following orders were created for panel order COVID PRE-OP / PRE-PROCEDURE SCREENING ORDER (NO ISOLATION) - Swab, Nasopharynx.  Procedure                               Abnormality         Status                     ---------                               -----------         ------                     COVID-19,CEPHEID/ROHAN,CO...[[id number]]  Normal              Final result                 Please view results for these tests on the individual orders.    COVID-19,CEPHEID/ROHAN,COR/ZEYNEP/PAD/BEATRICE IN-HOUSE(OR EMERGENT/ADD-ON),NP SWAB IN TRANSPORT MEDIA 3-4 HR TAT, RT-PCR - Swab, Nasopharynx [[id number]]  (Normal) Collected: 06/29/22 1424    Lab Status: Final result Specimen: Swab from Nasopharynx Updated: 06/29/22 1454     COVID19 Not Detected    Narrative:      Fact sheet for providers: https://www.fda.gov/media/560800/download     Fact sheet for patients: https://www.fda.gov/media/736544/download  Fact sheet for providers: https://www.fda.gov/media/158558/download    Fact sheet for patients: https://www.fda.gov/media/940931/download    Test performed by PCR.    SKYE AURIS SCREEN - Swab, Axilla Right, Axilla Left and Groin [[id number]]  (Normal) Collected: 06/29/22 1419    Lab Status: Preliminary result Specimen: Swab from  Axilla Right, Axilla Left and Groin Updated: 07/03/22 1432     Candida Auris Screen Culture No Candida auris isolated at 4 days    COVID-19,CEPHEID/ROHAN,COR/ZEYNEP/PAD/BEATRICE IN-HOUSE(OR EMERGENT/ADD-ON),NP SWAB IN TRANSPORT MEDIA 3-4 HR TAT, RT-PCR - Swab, Nasopharynx [[id number]]  (Normal) Collected: 06/28/22 0934    Lab Status: Final result Specimen: Swab from Nasopharynx Updated: 06/28/22 1009     COVID19 Not Detected    Narrative:      Fact sheet for providers: https://www.fda.gov/media/998241/download     Fact sheet for patients: https://www.fda.gov/media/835727/download  Fact sheet for providers: https://www.fda.gov/media/220480/download    Fact sheet for patients: https://www.[employer].gov/media/862556/download    Test performed by PCR.               Results for orders placed during the hospital encounter of 05/03/22    Duplex hemodialysis access CAR    Interpretation Summary  · Patent left brachiocephalic AV fistula with very low volume flow ranging 225-332 mL/min. Severe anastomotic stenosis. No outflow stenosis. Depth adequate throughout. Cephalic vein tributaries in the mid and distal arm.      Results for orders placed during the hospital encounter of 05/03/22    Duplex hemodialysis access CAR    Interpretation Summary  · Patent left brachiocephalic AV fistula with very low volume flow ranging 225-332 mL/min. Severe anastomotic stenosis. No outflow stenosis. Depth adequate throughout. Cephalic vein tributaries in the mid and distal arm.      Results for orders placed during the hospital encounter of 05/06/22    Adult Transesophageal Echo (MARSHA) W/ Cont if Necessary Per Protocol    Interpretation Summary  Date of study  5/13/2022    Indications  Bacteremia  Assessment for bacterial endocarditis    Procedure  Anesthesia was provided by anesthesiologist with intravenous Diprivan.  MARSHA probe could be passed without difficulty.  Patient tolerated the procedure well.  No complications were noted.    Procedure  performed  Transesophageal echocardiogram Doppler study (color continuous-wave and pulsed wave)    Results  Technically satisfactory study.  Mitral valve is structurally normal.  Tricuspid valve is normal.  Aortic valve is tricuspid and is normal.  Mild mitral aortic and tricuspid regurgitation is present.  Biatrial enlargement is present.  Left atrial appendage enlargement without clot.  Diffuse left ventricular enlargement and hypocontractility is present with ejection fraction of 25 to 30%.  No pericardial fusion or intracardiac thrombus is present.  Right atrium right ventricle enlargement is present.  Bubble study revealed PFO.  No pericardial effusion or intracardiac thrombus is seen.    Impression  Biatrial and biventricular enlargement.  Diffuse left ventricular hypocontractility with ejection fraction of 25 to 30%.  Mild smoking effect in the left atrium.  Small PFO.  Mild mitral aortic and tricuspid regurgitation is present.      Labs Pending at Discharge:  Pending Labs     Order Current Status    CANDIDA AURIS SCREEN - Swab, Axilla Right, Axilla Left and Groin Preliminary result          Procedures Performed           Consults:   Consults     Date and Time Order Name Status Description    6/29/2022  3:21 PM Gastroenterology Consult Completed     6/29/2022  2:16 PM Hospitalist (on-call MD unless specified)      6/29/2022  1:53 PM Vascular Surgery (on-call MD unless specified) Completed     6/29/2022  1:52 PM Nephrology (on -call MD unless specified) Completed             Discharge Details        Discharge Medications      Continue These Medications      Instructions Start Date   albuterol sulfate  (90 Base) MCG/ACT inhaler  Commonly known as: PROVENTIL HFA;VENTOLIN HFA;PROAIR HFA   2 puffs, Inhalation, Every 4 Hours - RT      amiodarone 200 MG tablet  Commonly known as: PACERONE   200 mg, Oral, Every 24 Hours Scheduled      apixaban 5 MG tablet tablet  Commonly known as: ELIQUIS   5 mg, Oral, Every  12 Hours Scheduled      aspirin 81 MG EC tablet   81 mg, Oral, Daily      atorvastatin 40 MG tablet  Commonly known as: LIPITOR   40 mg, Oral, Nightly      bisacodyl 10 MG suppository  Commonly known as: DULCOLAX   10 mg, Rectal, Daily PRN      budesonide 0.5 MG/2ML nebulizer solution  Commonly known as: Pulmicort   0.5 mg, Nebulization, 2 Times Daily      cholecalciferol 25 MCG (1000 UT) tablet  Commonly known as: VITAMIN D3   1,000 Units, Oral, Daily      clopidogrel 75 MG tablet  Commonly known as: PLAVIX   75 mg, Oral, Daily      docusate sodium 100 MG capsule  Commonly known as: COLACE   100 mg, Oral, Daily      donepezil 10 MG tablet  Commonly known as: ARICEPT   10 mg, Oral, Nightly      fluticasone 50 MCG/ACT nasal spray  Commonly known as: FLONASE   2 sprays, Each Nare, 2 Times Daily      guaiFENesin 600 MG 12 hr tablet  Commonly known as: MUCINEX   600 mg, Oral, 2 Times Daily      HYDROcodone-acetaminophen 5-325 MG per tablet  Commonly known as: NORCO   1 tablet, Oral, 3 Times Daily      ipratropium-albuterol 0.5-2.5 mg/3 ml nebulizer  Commonly known as: DUO-NEB   3 mL, Nebulization, 3 Times Daily - RT      ipratropium-albuterol 0.5-2.5 mg/3 ml nebulizer  Commonly known as: DUO-NEB   3 mL, Nebulization, Every 4 Hours PRN      levothyroxine 50 MCG tablet  Commonly known as: SYNTHROID, LEVOTHROID   50 mcg, Oral, Every Early Morning      magnesium hydroxide 400 MG/5ML suspension  Commonly known as: MILK OF MAGNESIA   30 mL, Oral, Daily PRN      metoprolol tartrate 25 MG tablet  Commonly known as: LOPRESSOR   25 mg, Oral, 2 Times Daily      midodrine 5 MG tablet  Commonly known as: PROAMATINE   15 mg, Oral, 3 Times Daily Before Meals      omeprazole 20 MG capsule  Commonly known as: priLOSEC   20 mg, Oral, Daily      ondansetron 4 MG tablet  Commonly known as: ZOFRAN   4 mg, Oral, Every 8 Hours PRN      polyethylene glycol 17 g packet  Commonly known as: MIRALAX   17 g, Oral, 2 Times Daily      Preparation H  0.25-14-74.9 % ointment hemorrhoidal ointment  Generic drug: phenylephrine-mineral oil-petrolatum   1 application, Rectal, Daily PRN      sertraline 50 MG tablet  Commonly known as: ZOLOFT   50 mg, Oral, Daily      vitamin B-12 1000 MCG tablet  Commonly known as: CYANOCOBALAMIN   1,000 mcg, Oral, Daily             Allergies   Allergen Reactions   • Codeine Itching         Discharge Disposition:  Rehab Facility or Unit (DC - External)    Diet:  Hospital:  Diet Order   Procedures   • Diet Diabetic/Consistent Carbs; Diabetic - Consistent Carb         Discharge Activity:         CODE STATUS:  Code Status and Medical Interventions:   Ordered at: 06/29/22 1521     Level Of Support Discussed With:    Patient     Code Status (Patient has no pulse and is not breathing):    CPR (Attempt to Resuscitate)     Medical Interventions (Patient has pulse or is breathing):    Full Support         Future Appointments   Date Time Provider Department Center   7/12/2022  9:30 AM [facility]   7/12/2022 10:20 AM Jose G Rm MD [facility]           Time spent on Discharge including face to face service:  25 minutes    Signature:    Electronically signed by Ivan Tapia DO, 07/04/22, 11:57 AM EDT.

## 2022-07-05 NOTE — CASE MANAGEMENT/SOCIAL WORK
Case Management Discharge Note      Final Note: [facility].    Selected Continued Care - Discharged on 7/4/2022 Admission date: 6/29/2022 - Discharge disposition: Rehab Facility or Unit (DC - External)    Destination Coordination complete.    Service Provider Selected Services Address Phone Fax Patient Preferred    [facility] [address] [place] [address] [phone] --           Transportation Services  Ambulance: [place]    Final Discharge Disposition Code: 04 - intermediate care facility

## 2022-07-06 NOTE — TELEPHONE ENCOUNTER
Hancock asking if pt still needs to be on plavix 75 mg,asa 81 mg,and eliquis 5 mg for a fib, recent [facility] dc for gastrointestinal hemorrhage. Please advise

## 2022-07-12 NOTE — PROGRESS NOTES
Encounter Date:07/12/2022  Last seen 5/31/2022      Patient ID: Benson Mclean is a 71 y.o. male.    Chief Complaint:  Status post CABG  Ischemic cardiomyopathy  Status post stent  ESRD        History of Present Illness  Patient recently was seen on 5/31/2022 with following history.    Patient recently was admitted to [facility] for shortness of breath.  While he was in the hospital patient had ventricular fibrillation.  Subsequently patient had cardiac catheterization and stent placement to LAD.  Consideration was given for LifeVest placement however LifeVest people came he was not able to be educated about it and LifeVest was not placed.  Consideration was given for ICD placement.  Patient had streptococcal bacteremia and was treated with antibiotics.     Since I have last seen, the patient has been without any chest discomfort ,shortness of breath, palpitations, dizziness or syncope.  Denies having any headache ,abdominal pain ,nausea, vomiting , diarrhea constipation, loss of weight or loss of appetite.  Denies having any excessive bruising ,hematuria or blood in the stool.     Review of all systems negative except as indicated.     Reviewed ROS.    [facility]  Patient is having intermittent dialysis.     Assessment and Plan      [[[[[[[[[[[[[[[[[[[[[[[[[    Impression  ==============  - recent positive blood cultures for Streptococcus 2 out of 2 sets.  Patient is on antibiotics.     -Acute on chronic fluid overload.      -status post CABG 2005. status post stent to LAD 2009    Status post stent to LAD 11/13/2015  Status post stent to mid LAD and SVG to marginal branch 09/16/2016.  Status post stent to mid LAD 5/24/2021  Status post stent to SVG to RCA 5/26/2021  Status post stent to proximal LAD 5/13/2022      Cardiac catheterization 5/13/2022 revealed  Left ventricle is significantly enlarged with severe and diffuse hypocontractility with ejection  fraction of 20%.  Left main coronary artery is normal.  Left anterior descending artery has proximal 90% disease.  Mid segment stents are patent.  Circumflex coronary artery is totally occluded.  Right coronary artery is totally occluded.  SVG to marginal branch is totally and chronically occluded.  SVG to RCA is patent.  Left subclavian artery is normal.  Left internal mammary artery is not a graft and is normal.        -status post myocardial infarction 2000 and 2002 .      -history of intermittent but mostly persistent atrial fibrillation     -Acute on chronic congestive heart failure.  Compensated at this time.    -Ischemic cardiomyopathy  Echocardiogram 7/12/2022     Aortic valve is thickened with adequate opening motion.  Mitral valve is thickened with adequate opening motion.  Left ventricular enlargement with diffuse hypocontractility with ejection fraction of 25%.  Large left pleural effusion.     -History of right-sided weakness with left MCA territory stroke.-Improved     MARSHA 5/13/2022 revealed  Biatrial and biventricular enlargement.  Diffuse left ventricular hypocontractility with ejection fraction of 25 to 30%.  Mild smoking effect in the left atrium.  Small PFO.  Mild mitral aortic and tricuspid regurgitation is present.     Transesophageal echocardiogram 11/30/2020.  Biatrial enlargement.  Smoking effect in the left atrium and left ventricle and left atrial appendage.  Mild mitral and aortic regurgitation.  Left ventricle enlargement with diffuse hypocontractility with ejection fraction of 35 to 40%.     Echocardiogram 11/27/2020 revealed left atrial enlargement left ventricle dysfunction with ejection fraction of 40%.  Negative bubble study.      -diabetes hypertension and sleep apnea.  ESRD.     -status post colon surgery (partial colectomy) appendectomy cholecystectomy right 4th toe removal and carpal tunnel surgery      -continued smoking 1 pack per day -abstinence from smoking      -allergy to  codeine.  ============   Plan  ================  Patient recently was admitted to [facility] for shortness of breath.  While he was in the hospital patient had ventricular fibrillation.  Subsequently patient had cardiac catheterization and stent placement to LAD.  Consideration was given for LifeVest placement however LifeVest people came he was not able to be educated about it and LifeVest was not placed.     Positive blood cultures for Streptococcus 2 out of 2 sets.  MARSHA 5/13/2022-negative for vegetations.  Patient is on antibiotics.     Status post CABG  Recent CODE BLUE and ventricular fibrillation.  Patient had cardiac catheterization and stent placement to proximal LAD 5/13/2022.  Patient is not having any angina pectoris or congestive heart failure     Atrial fibrillation-chronic  Rate is better controlled  Patient is on Coreg at 12.5 mg p.o. twice a day amiodarone and Cardizem.      ESRD  Patient is on intermittent dialysis.       Anticoagulation  Patient was on Eliquis 5 mg twice a day.  Observe for toxic effects.     Ischemic cardiomyopathy.  Recent ventricular fibrillation probably due to severe proximal left anterior descending artery disease.  Likely left ventricular dysfunction will continue despite having recent stent placement.  Likely patient would benefit from ICD placement (single-chamber).  Patient has narrow QRS complex.  However would like to wait until the infection is cleared up.  Patient may benefit from LifeVest.  (Please see the discussion above)  I am not sure however patient would comply with wearing LifeVest.  We discussed with infectious disease regarding timing of placement of ICD.  LifeVest was requested.  However patient apparently could not follow instructions from LifeVest instructors and they did not think he will be a suitable for LifeVest.     Current medications include amiodarone 200 mg a day aspirin atorvastatin Plavix levothyroxine metoprolol 25 mg twice a day  midodrine 15 mg 3 times daily pantoprazole.     I had a lengthy discussion with patient and patient's brother regarding present options that included ICD placement if patient has persistent left ventricular dysfunction.  Follow-up in the office in 6 weeks with an echocardiogram.  Also had a lengthy discussion with him regarding increased risk of bleeding and infection given his overall status.    [[[[[[[[[[[[  Above information was reviewed again.  Patient had an echocardiogram today that showed     Aortic valve is thickened with adequate opening motion.  Mitral valve is thickened with adequate opening motion.  Left ventricular enlargement with diffuse hypocontractility with ejection fraction of 25%.  Large left pleural effusion.    I had a lengthy discussion with patient and patient's son at bedside.  Have discussed with him regarding benefit of ICD however there are multiple reasons to hold off on at this time although his risk of ventricular tachycardia is increased.  Patient overall condition is somewhat poor.  Patient had recently sepsis with Streptococcus.  Patient is undergoing dialysis intermittently.  Patient has Shiley catheter in the right subclavian site and patient has AV fistula on the left side.  Site for ICD placement is of concern.  Also his risk of infection is extremely high especially given his overall condition.  With this in mind patient and patient's son agreed that we should hold off on ICD at this time.    Follow-up in the office in 6 months.     Further plan will depend on patient's progress.   ]]]]]]]]]]]]]]]]]]]]]]                   Diagnosis Plan   1. Hx of CABG     2. Diabetes mellitus due to underlying condition without complication, without long-term current use of insulin (HCC)     3. Acute on chronic renal insufficiency     4. Essential hypertension     5. Dyslipidemia     6. Tachycardia     7. Cardiomyopathy, dilated (HCC)     8. End stage renal disease (HCC)     9. Congestive  heart failure, unspecified HF chronicity, unspecified heart failure type (HCC)     LAB RESULTS (LAST 7 DAYS)    CBC        BMP        CMP         BNP        TROPONIN        CoAg        Creatinine Clearance  Estimated Creatinine Clearance: 26.5 mL/min (A) (by C-G formula based on SCr of 3.13 mg/dL (H)).    ABG        Radiology  No radiology results for the last day                The following portions of the patient's history were reviewed and updated as appropriate: allergies, current medications, past family history, past medical history, past social history, past surgical history and problem list.    Review of Systems   Constitutional: Negative for fever and malaise/fatigue.   Cardiovascular: Negative for chest pain, dyspnea on exertion and palpitations.   Respiratory: Negative for cough and shortness of breath.    Skin: Negative for rash.   Gastrointestinal: Negative for abdominal pain, nausea and vomiting.   Neurological: Negative for focal weakness and headaches.   All other systems reviewed and are negative.        Current Outpatient Medications:   •  albuterol sulfate  (90 Base) MCG/ACT inhaler, Inhale 2 puffs Every 4 (Four) Hours., Disp: , Rfl:   •  amiodarone (PACERONE) 200 MG tablet, Take 1 tablet by mouth Daily., Disp: 30 tablet, Rfl: 0  •  apixaban (ELIQUIS) 5 MG tablet tablet, Take 1 tablet by mouth Every 12 (Twelve) Hours. Indications: Atrial Fibrillation, Disp: 60 tablet, Rfl:   •  atorvastatin (LIPITOR) 40 MG tablet, Take 40 mg by mouth Every Night., Disp: , Rfl:   •  bisacodyl (DULCOLAX) 10 MG suppository, Insert 10 mg into the rectum Daily As Needed for Constipation., Disp: , Rfl:   •  budesonide (Pulmicort) 0.5 MG/2ML nebulizer solution, Take 2 mL by nebulization 2 (Two) Times a Day., Disp: , Rfl:   •  cholecalciferol (VITAMIN D3) 25 MCG (1000 UT) tablet, Take 1,000 Units by mouth Daily., Disp: , Rfl:   •  clopidogrel (PLAVIX) 75 MG tablet, Take 1 tablet by mouth Daily., Disp: 30 tablet,  Rfl: 30  •  docusate sodium (COLACE) 100 MG capsule, Take 100 mg by mouth Daily., Disp: , Rfl:   •  donepezil (ARICEPT) 10 MG tablet, Take 10 mg by mouth Every Night., Disp: , Rfl:   •  fluticasone (FLONASE) 50 MCG/ACT nasal spray, 2 sprays by Each Nare route 2 (Two) Times a Day., Disp: , Rfl:   •  guaiFENesin (MUCINEX) 600 MG 12 hr tablet, Take 600 mg by mouth 2 (Two) Times a Day., Disp: , Rfl:   •  HYDROcodone-acetaminophen (NORCO) 5-325 MG per tablet, Take 1 tablet by mouth 3 (Three) Times a Day., Disp: , Rfl:   •  ipratropium-albuterol (DUO-NEB) 0.5-2.5 mg/3 ml nebulizer, Take 3 mL by nebulization Every 4 (Four) Hours As Needed for Wheezing., Disp: 360 mL, Rfl:   •  levothyroxine (SYNTHROID, LEVOTHROID) 50 MCG tablet, Take 50 mcg by mouth Every Morning., Disp: , Rfl:   •  magnesium hydroxide (MILK OF MAGNESIA) 400 MG/5ML suspension, Take 30 mL by mouth Daily As Needed for Constipation., Disp: , Rfl:   •  Methoxy PEG-Epoetin Beta (MIRCERA IJ), 50 mcg Every 14 (Fourteen) Days., Disp: , Rfl:   •  Methoxy PEG-Epoetin Beta (MIRCERA IJ), 30 mcg Every 14 (Fourteen) Days., Disp: , Rfl:   •  metoprolol tartrate (LOPRESSOR) 25 MG tablet, Take 1 tablet by mouth 2 (Two) Times a Day., Disp: 60 tablet, Rfl: 0  •  midodrine (PROAMATINE) 5 MG tablet, Take 3 tablets by mouth 3 (Three) Times a Day Before Meals., Disp: 90 tablet, Rfl: 0  •  omeprazole (priLOSEC) 20 MG capsule, Take 20 mg by mouth Daily., Disp: , Rfl:   •  ondansetron (ZOFRAN) 4 MG tablet, Take 4 mg by mouth Every 8 (Eight) Hours As Needed for Nausea or Vomiting., Disp: , Rfl:   •  phenylephrine-mineral oil-petrolatum (Preparation H) 0.25-14-74.9 % ointment hemorrhoidal ointment, Insert 1 application into the rectum Daily As Needed., Disp: , Rfl:   •  polyethylene glycol (MIRALAX) 17 g packet, Take 17 g by mouth 2 (Two) Times a Day., Disp: , Rfl:   •  sertraline (ZOLOFT) 50 MG tablet, Take 50 mg by mouth Daily., Disp: , Rfl:   •  vitamin B-12 (CYANOCOBALAMIN) 1000  MCG tablet, Take 1,000 mcg by mouth Daily., Disp: , Rfl:     Allergies   Allergen Reactions   • Codeine Itching       Family History   Problem Relation Age of Onset   • Heart disease Mother    • Heart disease Father    • Diabetes Sister    • Heart disease Sister    • Diabetes Brother    • Mental illness Brother        Past Surgical History:   Procedure Laterality Date   • ANGIOPLASTY      X2   • APPENDECTOMY     • ARTERIOVENOUS FISTULA/SHUNT SURGERY Left 1/20/2022    Procedure: LEFT BRACHIAL CEPHALIC ARTERIAL VENOUS FISTULA CREATION;  Surgeon: Yony Davila MD;  Location: [facility] MAIN OR;  Service: Vascular;  Laterality: Left;   • BRONCHOSCOPY N/A 5/19/2022    Procedure: BRONCHOSCOPY with left lung washing;  Surgeon: Angel Prajapati MD;  Location: [facility] ENDOSCOPY;  Service: Pulmonary;  Laterality: N/A;   • CARDIAC CATHETERIZATION  11/13/2015   • CARDIAC CATHETERIZATION N/A 5/24/2021    Procedure: Left Heart Cath and coronary angiogram;  Surgeon: Jose G Rm MD;  Location: [facility] CATH INVASIVE LOCATION;  Service: Cardiovascular;  Laterality: N/A;   • CARDIAC CATHETERIZATION  5/24/2021    Procedure: Saphenous Vein Graft;  Surgeon: Jose G Rm MD;  Location: [facility] CATH INVASIVE LOCATION;  Service: Cardiovascular;;   • CARDIAC CATHETERIZATION N/A 5/24/2021    Procedure: Percutaneous Coronary Intervention;  Surgeon: Bernabe Zambrano MD;  Location: [facility] CATH INVASIVE LOCATION;  Service: Cardiology;  Laterality: N/A;   • CARDIAC CATHETERIZATION N/A 5/24/2021    Procedure: Stent NIELS coronary;  Surgeon: Bernabe Zambrano MD;  Location: [facility] CATH INVASIVE LOCATION;  Service: Cardiology;  Laterality: N/A;   • CARDIAC CATHETERIZATION N/A 5/26/2021    Procedure: Percutaneous Coronary Intervention;  Surgeon: Bernabe Zambrano MD;  Location: [facility] CATH INVASIVE LOCATION;  Service: Cardiovascular;  Laterality: N/A;   • CARDIAC CATHETERIZATION N/A 5/26/2021    Procedure: Stent NIELS bypass graft;  Surgeon: Kenay  MD Bernabe;  Location: [facility] CATH INVASIVE LOCATION;  Service: Cardiovascular;  Laterality: N/A;   • CARDIAC CATHETERIZATION N/A 5/13/2022    Procedure: Left Heart Cath and coronary angiogram;  Surgeon: Jose G Rm MD;  Location: [facility] CATH INVASIVE LOCATION;  Service: Cardiovascular;  Laterality: N/A;   • CARDIAC CATHETERIZATION  5/13/2022    Procedure: Saphenous Vein Graft;  Surgeon: Jose G Rm MD;  Location: [facility] CATH INVASIVE LOCATION;  Service: Cardiovascular;;   • CARDIAC CATHETERIZATION N/A 5/13/2022    Procedure: Stent NIELS coronary;  Surgeon: Estefany Hyde MD;  Location: [facility] CATH INVASIVE LOCATION;  Service: Cardiovascular;  Laterality: N/A;   • CARDIAC ELECTROPHYSIOLOGY PROCEDURE N/A 5/24/2021    Procedure: Temporary Pacemaker;  Surgeon: Bernabe Zambrano MD;  Location: [facility] CATH INVASIVE LOCATION;  Service: Cardiology;  Laterality: N/A;   • CARDIAC ELECTROPHYSIOLOGY PROCEDURE N/A 5/26/2021    Procedure: Temporary Pacemaker;  Surgeon: Bernabe Zambrano MD;  Location: [facility] CATH INVASIVE LOCATION;  Service: Cardiovascular;  Laterality: N/A;   • CARPAL TUNNEL RELEASE Left 04/29/2018    carpal tunnel- lt hand// other hand surgeries    • CATARACT EXTRACTION, BILATERAL  2002    Dr. Lux Acosta   • CHOLECYSTECTOMY     • COLON RESECTION  2005   • COLONOSCOPY N/A 5/10/2022    Procedure: COLONOSCOPY with polypectomy x3;  Surgeon: Herbie Keane MD;  Location: [facility] ENDOSCOPY;  Service: Gastroenterology;  Laterality: N/A;  colon polyps;internal hemorrhoids   • CORONARY ANGIOPLASTY     • CORONARY ANGIOPLASTY WITH STENT PLACEMENT  11/13/2015    PTCA stent to proximal in stent and mid to distal lad   • CORONARY ANGIOPLASTY WITH STENT PLACEMENT  09/16/2016    PTCA stent to mid lad and stent to vein graft to marginal   • CORONARY ARTERY BYPASS GRAFT  2005    @ [facility]   • CYST REMOVAL      cyst removed from scrotum   • FOOT SURGERY Right 07/17/2018   • FOOT SURGERY Left 02/04/2019   • CELESTE  PLACEMENT     • TOE AMPUTATION Right     right toe removed D/T infected cut that went to the bone       Past Medical History:   Diagnosis Date   • A-fib ([facility]) 8/3/2021   • Anemia    • Appetite loss    • Arthritis    • Brain bleed ([facility])    • Carpal tunnel syndrome on left    • CHF (congestive heart failure) ([facility])    • Chronic left-sided low back pain without sciatica 12/27/2017   • CKD (chronic kidney disease) stage 3, GFR 30-59 ml/min ([facility])    • Closed fracture of seventh thoracic vertebra ([facility]) 8/23/2020   • Cognitive communication deficit 12/1/2020   • COPD (chronic obstructive pulmonary disease) ([facility])    • Coronary artery disease    • COVID-19 2/19/2021   • Cytokine release syndrome, grade 1 5/24/2021   • Depression    • Diabetic neuropathy ([facility])    • Dialysis patient ([facility])     mon wed fri   • DM2 (diabetes mellitus, type 2) ([facility])    • GERD (gastroesophageal reflux disease)    • Hyperlipidemia    • Hypertension    • Hypogonadism in male    • Neuropathy    • Nonsustained ventricular tachycardia ([facility]) 7/23/2021   • Obesity    • Paroxysmal atrial fibrillation ([facility]) 12/1/2020   • Sleep apnea    • Stroke ([facility])    • Type 2 diabetes mellitus without complications ([facility]) 4/6/2022   • Unsteady gait        Family History   Problem Relation Age of Onset   • Heart disease Mother    • Heart disease Father    • Diabetes Sister    • Heart disease Sister    • Diabetes Brother    • Mental illness Brother        Social History     Socioeconomic History   • Marital status:    Tobacco Use   • Smoking status: Former Smoker     Packs/day: 1.00     Years: 60.00     Pack years: 60.00     Types: Cigarettes   • Smokeless tobacco: Never Used   • Tobacco comment: Patient quit smoking 11/2020   Vaping Use   • Vaping Use: Never used   Substance and Sexual Activity   • Alcohol use: No   • Drug use: Yes     Frequency: 1.0 times per week     Types: Marijuana     Comment: socially   • Sexual activity: Defer  "        Procedures      Objective:       Physical Exam    /77   Pulse 60   Ht 180.3 cm (71\")   Wt 103 kg (228 lb)   SpO2 98%   BMI 31.80 kg/m²   The patient is alert, oriented and in no distress.    Vital signs as noted above.    Head and neck revealed no carotid bruits or jugular venous distension.  No thyromegaly or lymphadenopathy is present.    Lungs clear.  No wheezing.  Breath sounds are normal bilaterally.    Heart normal first and second heart sounds.  No murmur..  No pericardial rub is present.  No gallop is present.    Abdomen soft and nontender.  No organomegaly is present.    Extremities revealed good peripheral pulses without any pedal edema.  Right upper extremity swelling.  Patient has straight catheter from the right subclavian site.  Patient has AV fistula in the left arm.    Skin warm and dry.  Multiple areas of bruising.    Musculoskeletal system is grossly normal.    CNS grossly normal.    Reviewed and updated.        "

## 2022-10-03 PROBLEM — F03.918 DEMENTIA WITH BEHAVIORAL DISTURBANCE: Chronic | Status: ACTIVE | Noted: 2020-12-01

## 2024-12-18 NOTE — ED NOTES
Pt up for discharge- waiting for transport back to nursing home- pt fed by  tech   Current every day smoker

## (undated) DEVICE — MAT PREVALON MOBL TRANSFR AIR W/PAD 39X81IN

## (undated) DEVICE — HI-TORQUE WHISPER MS GUIDE WIRE .014 STRAIGHT TIP 3.0 CM X 190 CM: Brand: HI-TORQUE WHISPER

## (undated) DEVICE — ELECTRD DEFIB M/FUNC PROPADZ RADIOL 2PK

## (undated) DEVICE — DEV INFL COMPAK W/ACCESSPLUS IN4530

## (undated) DEVICE — GOWN,REINFORCE,POLY,SIRUS,BREATH SLV,LG: Brand: MEDLINE

## (undated) DEVICE — SYR LL TP 10ML STRL

## (undated) DEVICE — VI-DRAPE ISOLATION BAG: Brand: CONVERTORS

## (undated) DEVICE — GLV SURG SIGNATURE ESSENTIAL PF LTX SZ7.5

## (undated) DEVICE — MICRO TIP WIPE: Brand: DEVON

## (undated) DEVICE — SUT VIC 3/0 SH 27IN J416H

## (undated) DEVICE — RADIFOCUS GLIDEWIRE: Brand: GLIDEWIRE

## (undated) DEVICE — PENCL HND ROCKRSWTCH HOLSTR EZ CLEAN TP CRD 10FT

## (undated) DEVICE — BOWL PLSTC MD 16OZ BLU STRL

## (undated) DEVICE — BAPTIST FLOYD BRONCHOSCOPY: Brand: MEDLINE INDUSTRIES, INC.

## (undated) DEVICE — UNDYED BRAIDED (POLYGLACTIN 910), SYNTHETIC ABSORBABLE SUTURE: Brand: COATED VICRYL

## (undated) DEVICE — GUIDE CATHETER: Brand: MACH1™

## (undated) DEVICE — PK ENDO GI 50

## (undated) DEVICE — SNAR POLYP HOTSNARE/BRAIDED OVL/MINI 7F 2.8X10MM 230CM 1P/U

## (undated) DEVICE — BALN EUPHORA 2.5X20MM

## (undated) DEVICE — PK TRY HEART CATH 50

## (undated) DEVICE — PTA BALLOON DILATATION CATHETER: Brand: MUSTANG™

## (undated) DEVICE — TBG NAMIC PRESS MONTR A/ F/M 12IN

## (undated) DEVICE — DRAPE,HAND,STERILE: Brand: MEDLINE

## (undated) DEVICE — PINNACLE INTRODUCER SHEATH: Brand: PINNACLE

## (undated) DEVICE — TRAP WIDEEYE POLYP

## (undated) DEVICE — HI-TORQUE BALANCE MIDDLEWEIGHT UNIVERSAL II GUIDE WIRE STRAIGHT TIP PAK  190 CM: Brand: HI-TORQUE BALANCE MIDDLEWEIGHT UNIVERSAL II

## (undated) DEVICE — KT SURG TURNOVER 050

## (undated) DEVICE — CATH DIAG IMPULSE FL4 5F 100CM

## (undated) DEVICE — BALN EUPHORA 3X12MM

## (undated) DEVICE — GW DIAG EMERALD HEPCOAT MOVE JTIP STD .035 3MM 150CM

## (undated) DEVICE — KT PK ANGIOPLASTY ACC 9 MERIT

## (undated) DEVICE — CATH SFT VU KUMPHE NON BR 5F65CM .038

## (undated) DEVICE — STPCK 3WY HP ROT

## (undated) DEVICE — ADHS SKIN SURG TISS VISC PREMIERPRO EXOFIN HI/VISC FAST/DRY

## (undated) DEVICE — 6F .070 XB 3.5 100CM: Brand: VISTA BRITE TIP

## (undated) DEVICE — SWAN-GANZ BIPOLAR PACING CATHETER: Brand: SWAN-GANZ

## (undated) DEVICE — SI BRITE TIP SHEATH F6 5.5CM: Brand: BRITE TIP

## (undated) DEVICE — DISPOSABLE ADAPTER

## (undated) DEVICE — BALN NC/EUPHORA RX 3.50X15MM

## (undated) DEVICE — SUT PROLN 3/0 FS2 18IN 8665G

## (undated) DEVICE — PK MINOR VASC 50

## (undated) DEVICE — CONTRST ISOVUE300 61PCT 50ML

## (undated) DEVICE — SPNG LAP PREWSH SFTPK 18X18IN STRL PK/5

## (undated) DEVICE — CATH DIAG IMPULSE PIG 5F 100CM

## (undated) DEVICE — CATH DIAG IMPULSE FR4 5F 100CM

## (undated) DEVICE — ST ACC MICROPUNCTURE STFF/CANN PLAT/TP 4F 21G 40CM

## (undated) DEVICE — SUT SILK 3/0 SH CR8 18IN C013D

## (undated) DEVICE — SUT PROLN 6/0 BV1 D/A 30IN 8709H

## (undated) DEVICE — BALN NC/EUPHORA RX 4.00X15MM

## (undated) DEVICE — 6F .070 XB LAD 3.5 100CM: Brand: VISTA BRITE TIP

## (undated) DEVICE — SOL NS 500ML

## (undated) DEVICE — RADIFOCUS OBTURATOR: Brand: RADIFOCUS

## (undated) DEVICE — MAJOR VASCULAR PACK-LF: Brand: MEDLINE INDUSTRIES, INC.

## (undated) DEVICE — ADHS SKIN PREMIERPRO EXOFIN TOPICAL HI/VISC .5ML

## (undated) DEVICE — MEDICINE CUP, GRADUATED, STER: Brand: MEDLINE